# Patient Record
Sex: FEMALE | Race: WHITE | NOT HISPANIC OR LATINO | Employment: OTHER | URBAN - NONMETROPOLITAN AREA
[De-identification: names, ages, dates, MRNs, and addresses within clinical notes are randomized per-mention and may not be internally consistent; named-entity substitution may affect disease eponyms.]

---

## 2018-07-07 ENCOUNTER — APPOINTMENT (EMERGENCY)
Dept: CT IMAGING | Facility: HOSPITAL | Age: 33
DRG: 422 | End: 2018-07-07
Payer: COMMERCIAL

## 2018-07-07 ENCOUNTER — HOSPITAL ENCOUNTER (INPATIENT)
Facility: HOSPITAL | Age: 33
LOS: 2 days | Discharge: HOME | DRG: 422 | End: 2018-07-10
Attending: EMERGENCY MEDICINE | Admitting: INTERNAL MEDICINE
Payer: COMMERCIAL

## 2018-07-07 DIAGNOSIS — M62.82 NON-TRAUMATIC RHABDOMYOLYSIS: ICD-10-CM

## 2018-07-07 DIAGNOSIS — E83.51 HYPOCALCEMIA: ICD-10-CM

## 2018-07-07 DIAGNOSIS — R53.1 LEFT-SIDED WEAKNESS: ICD-10-CM

## 2018-07-07 DIAGNOSIS — E87.6 HYPOKALEMIA: Primary | ICD-10-CM

## 2018-07-07 DIAGNOSIS — E55.9 VITAMIN D DEFICIENCY: ICD-10-CM

## 2018-07-07 DIAGNOSIS — M62.81 MUSCLE WEAKNESS OF LEFT UPPER EXTREMITY: ICD-10-CM

## 2018-07-07 DIAGNOSIS — M79.10 GENERALIZED MUSCLE ACHE: ICD-10-CM

## 2018-07-07 DIAGNOSIS — R74.01 TRANSAMINITIS: ICD-10-CM

## 2018-07-07 DIAGNOSIS — D64.9 ANEMIA, UNSPECIFIED TYPE: ICD-10-CM

## 2018-07-07 DIAGNOSIS — R29.0 CARPOPEDAL SPASM: ICD-10-CM

## 2018-07-07 DIAGNOSIS — M62.81 MUSCLE WEAKNESS OF LOWER EXTREMITY: ICD-10-CM

## 2018-07-07 DIAGNOSIS — E83.39 HYPOPHOSPHATEMIA: ICD-10-CM

## 2018-07-07 PROBLEM — Z86.69 HISTORY OF MIGRAINE HEADACHES: Status: ACTIVE | Noted: 2018-07-07

## 2018-07-07 PROBLEM — Z98.84 HISTORY OF GASTRIC BYPASS: Status: ACTIVE | Noted: 2018-07-07

## 2018-07-07 LAB
ALBUMIN SERPL BCP-MCNC: 3.5 G/DL (ref 3.5–5)
ALP SERPL-CCNC: 59 U/L (ref 46–116)
ALT SERPL W P-5'-P-CCNC: 61 U/L (ref 12–78)
ANION GAP SERPL CALCULATED.3IONS-SCNC: 9 MMOL/L (ref 4–13)
ANION GAP SERPL CALCULATED.3IONS-SCNC: 9 MMOL/L (ref 4–13)
AST SERPL W P-5'-P-CCNC: 70 U/L (ref 5–45)
BILIRUB SERPL-MCNC: 0.2 MG/DL (ref 0.2–1)
BILIRUB UR QL STRIP: NEGATIVE
BUN SERPL-MCNC: 11 MG/DL (ref 5–25)
BUN SERPL-MCNC: 13 MG/DL (ref 5–25)
CALCIUM SERPL-MCNC: 7.7 MG/DL (ref 8.3–10.1)
CALCIUM SERPL-MCNC: 8.7 MG/DL (ref 8.3–10.1)
CHLORIDE SERPL-SCNC: 102 MMOL/L (ref 100–108)
CHLORIDE SERPL-SCNC: 108 MMOL/L (ref 100–108)
CK MB SERPL-MCNC: 6.3 NG/ML (ref 0–5)
CK MB SERPL-MCNC: <1 % (ref 0–2.5)
CK SERPL-CCNC: 968 U/L (ref 26–192)
CLARITY UR: CLEAR
CO2 SERPL-SCNC: 26 MMOL/L (ref 21–32)
CO2 SERPL-SCNC: 31 MMOL/L (ref 21–32)
COLOR UR: YELLOW
CREAT SERPL-MCNC: 1.09 MG/DL (ref 0.6–1.3)
CREAT SERPL-MCNC: 1.17 MG/DL (ref 0.6–1.3)
ERYTHROCYTE [DISTWIDTH] IN BLOOD BY AUTOMATED COUNT: 14.3 % (ref 11.6–15.1)
GFR SERPL CREATININE-BSD FRML MDRD: 61 ML/MIN/1.73SQ M
GFR SERPL CREATININE-BSD FRML MDRD: 66 ML/MIN/1.73SQ M
GLUCOSE SERPL-MCNC: 107 MG/DL (ref 65–140)
GLUCOSE SERPL-MCNC: 88 MG/DL (ref 65–140)
GLUCOSE UR STRIP-MCNC: NEGATIVE MG/DL
HCT VFR BLD AUTO: 40.1 % (ref 34.8–46.1)
HGB BLD-MCNC: 14.6 G/DL (ref 11.5–15.4)
HGB UR QL STRIP.AUTO: NEGATIVE
KETONES UR STRIP-MCNC: NEGATIVE MG/DL
LACTATE SERPL-SCNC: 1.9 MMOL/L (ref 0.5–2)
LEUKOCYTE ESTERASE UR QL STRIP: NEGATIVE
MAGNESIUM SERPL-MCNC: 2.4 MG/DL (ref 1.6–2.6)
MAGNESIUM SERPL-MCNC: 2.8 MG/DL (ref 1.6–2.6)
MCH RBC QN AUTO: 30.9 PG (ref 26.8–34.3)
MCHC RBC AUTO-ENTMCNC: 36.4 G/DL (ref 31.4–37.4)
MCV RBC AUTO: 85 FL (ref 82–98)
NITRITE UR QL STRIP: NEGATIVE
PH UR STRIP.AUTO: 6.5 [PH] (ref 4.5–8)
PHOSPHATE SERPL-MCNC: 3.3 MG/DL (ref 2.7–4.5)
PLATELET # BLD AUTO: 336 THOUSANDS/UL (ref 149–390)
PMV BLD AUTO: 10.2 FL (ref 8.9–12.7)
POTASSIUM SERPL-SCNC: 1.8 MMOL/L (ref 3.5–5.3)
POTASSIUM SERPL-SCNC: 1.8 MMOL/L (ref 3.5–5.3)
PROT SERPL-MCNC: 7.3 G/DL (ref 6.4–8.2)
PROT UR STRIP-MCNC: NEGATIVE MG/DL
RBC # BLD AUTO: 4.73 MILLION/UL (ref 3.81–5.12)
SODIUM SERPL-SCNC: 142 MMOL/L (ref 136–145)
SODIUM SERPL-SCNC: 143 MMOL/L (ref 136–145)
SP GR UR STRIP.AUTO: 1.01 (ref 1–1.03)
TSH SERPL DL<=0.05 MIU/L-ACNC: 2.6 UIU/ML (ref 0.36–3.74)
UROBILINOGEN UR QL STRIP.AUTO: 0.2 E.U./DL
WBC # BLD AUTO: 9.35 THOUSAND/UL (ref 4.31–10.16)

## 2018-07-07 PROCEDURE — 70450 CT HEAD/BRAIN W/O DYE: CPT

## 2018-07-07 PROCEDURE — 83605 ASSAY OF LACTIC ACID: CPT | Performed by: INTERNAL MEDICINE

## 2018-07-07 PROCEDURE — 84100 ASSAY OF PHOSPHORUS: CPT | Performed by: PHYSICIAN ASSISTANT

## 2018-07-07 PROCEDURE — 96361 HYDRATE IV INFUSION ADD-ON: CPT

## 2018-07-07 PROCEDURE — 83735 ASSAY OF MAGNESIUM: CPT | Performed by: PHYSICIAN ASSISTANT

## 2018-07-07 PROCEDURE — 83735 ASSAY OF MAGNESIUM: CPT | Performed by: INTERNAL MEDICINE

## 2018-07-07 PROCEDURE — 80053 COMPREHEN METABOLIC PANEL: CPT | Performed by: PHYSICIAN ASSISTANT

## 2018-07-07 PROCEDURE — 99285 EMERGENCY DEPT VISIT HI MDM: CPT

## 2018-07-07 PROCEDURE — 85025 COMPLETE CBC W/AUTO DIFF WBC: CPT | Performed by: PHYSICIAN ASSISTANT

## 2018-07-07 PROCEDURE — 36415 COLL VENOUS BLD VENIPUNCTURE: CPT | Performed by: PHYSICIAN ASSISTANT

## 2018-07-07 PROCEDURE — 96365 THER/PROPH/DIAG IV INF INIT: CPT

## 2018-07-07 PROCEDURE — 99219 PR INITIAL OBSERVATION CARE/DAY 50 MINUTES: CPT | Performed by: INTERNAL MEDICINE

## 2018-07-07 PROCEDURE — 82550 ASSAY OF CK (CPK): CPT | Performed by: PHYSICIAN ASSISTANT

## 2018-07-07 PROCEDURE — 80048 BASIC METABOLIC PNL TOTAL CA: CPT | Performed by: INTERNAL MEDICINE

## 2018-07-07 PROCEDURE — 82553 CREATINE MB FRACTION: CPT | Performed by: PHYSICIAN ASSISTANT

## 2018-07-07 PROCEDURE — 84443 ASSAY THYROID STIM HORMONE: CPT | Performed by: PHYSICIAN ASSISTANT

## 2018-07-07 PROCEDURE — 81003 URINALYSIS AUTO W/O SCOPE: CPT | Performed by: PHYSICIAN ASSISTANT

## 2018-07-07 RX ORDER — ONDANSETRON 2 MG/ML
4 INJECTION INTRAMUSCULAR; INTRAVENOUS EVERY 6 HOURS PRN
Status: DISCONTINUED | OUTPATIENT
Start: 2018-07-07 | End: 2018-07-10 | Stop reason: HOSPADM

## 2018-07-07 RX ORDER — AMITRIPTYLINE HYDROCHLORIDE 100 MG/1
25 TABLET, FILM COATED ORAL
COMMUNITY
End: 2018-09-24 | Stop reason: HOSPADM

## 2018-07-07 RX ORDER — POTASSIUM CHLORIDE 20 MEQ/1
60 TABLET, EXTENDED RELEASE ORAL ONCE
Status: COMPLETED | OUTPATIENT
Start: 2018-07-07 | End: 2018-07-07

## 2018-07-07 RX ORDER — ACETAMINOPHEN 325 MG/1
650 TABLET ORAL EVERY 6 HOURS PRN
Status: DISCONTINUED | OUTPATIENT
Start: 2018-07-07 | End: 2018-07-07

## 2018-07-07 RX ORDER — POTASSIUM CHLORIDE 20 MEQ/1
40 TABLET, EXTENDED RELEASE ORAL ONCE
Status: COMPLETED | OUTPATIENT
Start: 2018-07-07 | End: 2018-07-07

## 2018-07-07 RX ORDER — TOPIRAMATE 50 MG/1
25 TABLET, FILM COATED ORAL
COMMUNITY
End: 2018-10-14

## 2018-07-07 RX ORDER — POTASSIUM CHLORIDE 14.9 MG/ML
20 INJECTION INTRAVENOUS ONCE
Status: COMPLETED | OUTPATIENT
Start: 2018-07-07 | End: 2018-07-07

## 2018-07-07 RX ORDER — MECLIZINE HYDROCHLORIDE 25 MG/1
25 TABLET ORAL 2 TIMES DAILY
Status: ON HOLD | COMMUNITY
End: 2018-07-10

## 2018-07-07 RX ORDER — POTASSIUM CHLORIDE 20 MEQ/1
40 TABLET, EXTENDED RELEASE ORAL ONCE
Status: DISCONTINUED | OUTPATIENT
Start: 2018-07-07 | End: 2018-07-07

## 2018-07-07 RX ORDER — MAGNESIUM SULFATE HEPTAHYDRATE 40 MG/ML
2 INJECTION, SOLUTION INTRAVENOUS ONCE
Status: COMPLETED | OUTPATIENT
Start: 2018-07-07 | End: 2018-07-07

## 2018-07-07 RX ORDER — POTASSIUM CHLORIDE AND SODIUM CHLORIDE 900; 300 MG/100ML; MG/100ML
125 INJECTION, SOLUTION INTRAVENOUS CONTINUOUS
Status: DISCONTINUED | OUTPATIENT
Start: 2018-07-07 | End: 2018-07-09

## 2018-07-07 RX ADMIN — POTASSIUM CHLORIDE 60 MEQ: 1500 TABLET, EXTENDED RELEASE ORAL at 17:40

## 2018-07-07 RX ADMIN — ENOXAPARIN SODIUM 40 MG: 40 INJECTION, SOLUTION INTRAVENOUS; SUBCUTANEOUS at 20:22

## 2018-07-07 RX ADMIN — POTASSIUM CHLORIDE AND SODIUM CHLORIDE 125 ML/HR: 900; 300 INJECTION, SOLUTION INTRAVENOUS at 20:10

## 2018-07-07 RX ADMIN — MAGNESIUM SULFATE HEPTAHYDRATE 2 G: 40 INJECTION, SOLUTION INTRAVENOUS at 18:16

## 2018-07-07 RX ADMIN — POTASSIUM CHLORIDE 20 MEQ: 200 INJECTION, SOLUTION INTRAVENOUS at 17:37

## 2018-07-07 RX ADMIN — POTASSIUM CHLORIDE 40 MEQ: 1500 TABLET, EXTENDED RELEASE ORAL at 20:21

## 2018-07-07 RX ADMIN — SODIUM CHLORIDE 1000 ML: 0.9 INJECTION, SOLUTION INTRAVENOUS at 16:24

## 2018-07-07 NOTE — H&P
H&P- Thanh Jean 1985, 28 y o  female MRN: [id number]    Unit/Bed#: [facility] Encounter: [id number]    Primary Care Provider: Shruthi Stokes MD   Date and time admitted to hospital: 7/7/2018  3:41 PM        * Hypokalemia   Assessment & Plan    -place in 23-hour observation, med/surg level of care with telemetry monitoring in the setting of hypokalemia  -hypokalemia is likely multifactorial secondary the patient changing her diet over the last few weeks, secondary to dehydration, and with a history of gastric bypass  -replete with p o  and IV potassium chloride supplementation  -recheck a BMP and magnesium level at 2200 this evening  -consult Nephrology          Left-sided weakness   Assessment & Plan    -likely secondary to hypokalemia  -neurologic checks every 4 hours  -if the weakness does not improve with potassium supplementation, she will need a stroke workup        Non-traumatic rhabdomyolysis   Assessment & Plan    -IV fluids  -follow the total CK (creatinine kinase) level  -follow the renal function        History of migraine headaches   Assessment & Plan    -continue topamax and amitriptyline        History of gastric bypass   Assessment & Plan    -likely contributing to the hypokalemia              VTE Prophylaxis: Enoxaparin (Lovenox)  / sequential compression device   Code Status:  Level 1-Full Code      Anticipated Length of Stay:  Patient will be admitted on an Observation basis with an anticipated length of stay of less than 2 midnights  Justification for Hospital Stay:  The patient requires IV potassium chloride supplementation, IV fluids, and serial laboratory testing to monitor her electrolytes and total CK (creatinine kinase) level  Total Time for Visit, including Counseling / Coordination of Care: 45 minutes  Greater than 50% of this total time spent on direct patient counseling and coordination of care      Chief Complaint:  Weakness    History of Present Illness:    Thanh Jean is a 28 y o  female who presents to the emergency department with the complaint of weakness  Since the end of May 2018, the patient has been experiencing bilateral hand weakness, which has been episodic in nature  She would develop bilateral hand cramping, which led to involuntary closing of her bilateral hands  She then would experience difficulty picking up objects  Over the last few weeks, she has been experiencing intermittent left upper extremity weakness and paresthesias  The weakness and paresthesias were episodic in nature  Over the last few days, the patient has been experiencing left lower extremity weakness along with the left upper extremity weakness  Nothing seemed to improve her symptoms  She has been following a strict diet over the last few weeks and has a history of gastric bypass surgery  She denies any nausea, vomiting, or diarrhea  She denies any laxative use  She does thinks she may have a component of dehydration secondary to the inability to drink an adequate amount of fluid with her gastric bypass history  No chest pain  No shortness of breath  No fevers or chills  She complains of chronic, daily migraine headaches every since she suffered a concussion in the past     Review of Systems:    Review of Systems:  Per HPI, all other systems have been reviewed and were negative  Past Medical and Surgical History:     History reviewed  No pertinent past medical history  Past Surgical History:   Procedure Laterality Date    ABDOMINAL SURGERY      APPENDECTOMY      CHOLECYSTECTOMY      GASTRIC BYPASS      HYSTERECTOMY         Meds/Allergies:    Prior to Admission medications    Not on File     I have reviewed home medications with patient personally      Allergies: No Known Allergies    Social History:     Marital Status: /Civil Union     Substance Use History:   History   Alcohol Use    Yes     Comment: socially     History   Smoking Status    Never Smoker   Smokeless Tobacco    Not on file     History   Drug Use No       Family History:    non-contributory    Physical Exam:     Vitals:   Blood Pressure: 116/56 (07/07/18 1745)  Pulse: 76 (07/07/18 1745)  Temperature: 98 5 °F (36 9 °C) (07/07/18 1545)  Temp Source: Temporal (07/07/18 1545)  Respirations: 17 (07/07/18 1745)  Weight - Scale: 64 9 kg (143 lb) (07/07/18 1545)  SpO2: 98 % (07/07/18 1745)    Physical Exam    General:  NAD, awake, alert, oriented x 3  HEENT:  NC/AT, mucous membranes dry  Neck:  Supple, No JVP elevation  CV:  + S1, + S2, RRR  Pulm:  Lung fields are CTA bilaterally  Abd:  Soft, Non-tender, Non-distended  Ext:  No clubbing/cyanosis/edema  Skin:  No rashes  Neuro:  Decreased muscle strength involving the left upper extremity and left lower extremity rated as a 4/5 compared to a 5/5 muscle strength throughout the right upper extremity and right lower extremity      Additional Data:     Lab Results: I have personally reviewed pertinent reports  Results from last 7 days  Lab Units 07/07/18  1623   WBC Thousand/uL 9 35   HEMOGLOBIN g/dL 14 6   HEMATOCRIT % 40 1   PLATELETS Thousands/uL 336       Results from last 7 days  Lab Units 07/07/18  1623   SODIUM mmol/L 142   POTASSIUM mmol/L 1 8*   CHLORIDE mmol/L 102   CO2 mmol/L 31   BUN mg/dL 13   CREATININE mg/dL 1 09   CALCIUM mg/dL 8 7   TOTAL PROTEIN g/dL 7 3   BILIRUBIN TOTAL mg/dL 0 20   ALK PHOS U/L 59   ALT U/L 61   AST U/L 70*   GLUCOSE RANDOM mg/dL 88                   Imaging: I have personally reviewed pertinent reports  CT head without contrast   Final Result by Quang Ngo MD (07/07 1648)      No acute intracranial abnormality  Workstation performed: [id number]               [facility] Records Reviewed: Yes     ** Please Note: This note has been constructed using a voice recognition system   **

## 2018-07-07 NOTE — ASSESSMENT & PLAN NOTE
-place in 23-hour observation, med/surg level of care with telemetry monitoring in the setting of hypokalemia  -hypokalemia is likely multifactorial secondary the patient changing her diet over the last few weeks, secondary to dehydration, and with a history of gastric bypass  -replete with p o  and IV potassium chloride supplementation  -recheck a BMP and magnesium level at 2200 this evening  -consult Nephrology

## 2018-07-07 NOTE — ED PROVIDER NOTES
History  Chief Complaint   Patient presents with    Weakness - Generalized     Patient states that she started with a tingling in her left arm and hand 3 weeks ago  Over the past 3 weeks the weakness has started to move into her left leg, her right arm, and right leg  28 yr female with pmh of s/p gastric bypass 3 yrs ago presents with 3 weeks of weakness muscle tension and spasms tingling paresthesias  first sx was left hand curling up and spasming and going numb  Happened intermittently for about 1 week happening for 15 minutes or so at a time  She also had tingling sensation "like my hand was asleep" in left hand intermittently for about 1 week also  These sx became more frequent for the next 2 weeks and worse yet in the last 2 days  Now having decreased fine motor coordination and  strength in left hand- counting money out at the movie theatre and gripping a cup was difficult  In past 2 days she now also has left foot tightness and weakness in the left leg described as taking increased effort to lift left leg up a step/stairs  Today she has right wrist/forearm and right foot/calf "muscle tension "  Feeling tired lightheaded and somewhat weak all over  Sx commenced just after memorial day  Incidentally pt did restart her migraine maintenance meds at this same time  (topamax/elavil) and prn meclizine for vertigo she was on these previously for years but had been doing well and was off them for several months until restarting again last month  History provided by:  Patient      Prior to Admission Medications   Prescriptions Last Dose Informant Patient Reported?  Taking?   amitriptyline (ELAVIL) 100 mg tablet 7/6/2018 at 2200  Yes Yes   Sig: Take 25 mg by mouth daily at bedtime   meclizine (ANTIVERT) 25 mg tablet 7/6/2018 at 2200 Self Yes Yes   Sig: Take 25 mg by mouth 2 (two) times a day   topiramate (TOPAMAX) 50 MG tablet 7/6/2018 at 2200  Yes Yes   Sig: Take 25 mg by mouth every 12 (twelve) hours      Facility-Administered Medications: None       History reviewed  No pertinent past medical history  Past Surgical History:   Procedure Laterality Date    ABDOMINAL SURGERY      APPENDECTOMY      CHOLECYSTECTOMY      GASTRIC BYPASS      HYSTERECTOMY         History reviewed  No pertinent family history  I have reviewed and agree with the history as documented  Social History   Substance Use Topics    Smoking status: Never Smoker    Smokeless tobacco: Never Used    Alcohol use Yes      Comment: socially        Review of Systems   Constitutional: Positive for fatigue  Negative for activity change, appetite change, chills, diaphoresis, fever and unexpected weight change  HENT: Negative for congestion, ear pain, postnasal drip, rhinorrhea, sinus pressure, sore throat and voice change  Eyes: Negative for pain, discharge, redness and visual disturbance  Respiratory: Negative for cough, chest tightness and shortness of breath  Cardiovascular: Negative for chest pain, palpitations and leg swelling  Gastrointestinal: Negative for abdominal pain, constipation, diarrhea, nausea and vomiting  Genitourinary: Negative for decreased urine volume, difficulty urinating, dysuria, flank pain, frequency, hematuria and urgency  Musculoskeletal: Positive for gait problem and myalgias  Negative for arthralgias, back pain and neck pain  Skin: Negative for color change, rash and wound  Allergic/Immunologic: Negative for immunocompromised state  Neurological: Positive for weakness, light-headedness and numbness  Negative for dizziness, tremors, seizures, syncope, facial asymmetry, speech difficulty and headaches  Physical Exam  Physical Exam   Constitutional: She is oriented to person, place, and time  Vital signs are normal  She appears well-developed and well-nourished  She is active and cooperative  Non-toxic appearance  No distress     HENT:   Head: Normocephalic and atraumatic  Right Ear: Tympanic membrane and external ear normal    Left Ear: Tympanic membrane and external ear normal    Nose: Nose normal  No rhinorrhea  Mouth/Throat: Uvula is midline and oropharynx is clear and moist    Eyes: Conjunctivae, EOM and lids are normal  Pupils are equal, round, and reactive to light  Right eye exhibits no discharge  Left eye exhibits no discharge  Neck: Normal range of motion and full passive range of motion without pain  Neck supple  No JVD present  No thyromegaly present  Cardiovascular: Normal rate, regular rhythm, normal heart sounds and intact distal pulses  No murmur heard  Pulmonary/Chest: Effort normal and breath sounds normal  No accessory muscle usage  No tachypnea  No respiratory distress  She has no wheezes  Abdominal: Soft  Normal appearance and bowel sounds are normal  She exhibits no distension  There is no hepatosplenomegaly  There is no tenderness  There is no rebound and no CVA tenderness  No hernia  Musculoskeletal: Normal range of motion  She exhibits no tenderness  Lymphadenopathy:     She has no cervical adenopathy  Neurological: She is alert and oriented to person, place, and time  She is not disoriented  She displays no tremor  No cranial nerve deficit or sensory deficit  She exhibits abnormal muscle tone  She displays no seizure activity  Gait (slow purposeful gait) abnormal  Coordination normal  GCS eye subscore is 4  GCS verbal subscore is 5  GCS motor subscore is 6  No dysarthria, nystagmus, dysphagia, diplopia, aphasia, vertigo, ataxia, vision loss, dysmetria  Normal finger to nose, gait, rapid alternating movement  No pronator drift  Normal heel to shin  EOMI, no visual field defects  CN 2-13 intact  GCS 15  AOx3  Normal babinski  Strength: RUE 4/5 LUE 3/5; RLE 5/5 LLE 3/5   Skin: Skin is warm, dry and intact  Capillary refill takes less than 2 seconds  No rash noted  She is not diaphoretic  No erythema  No pallor  Psychiatric: She has a normal mood and affect  Her speech is normal and behavior is normal    Nursing note and vitals reviewed        Vital Signs  ED Triage Vitals   Temperature Pulse Respirations Blood Pressure SpO2   07/07/18 1545 07/07/18 1545 07/07/18 1545 07/07/18 1545 07/07/18 1545   98 5 °F (36 9 °C) 78 18 129/72 100 %      Temp Source Heart Rate Source Patient Position - Orthostatic VS BP Location FiO2 (%)   07/07/18 1545 07/07/18 1545 07/07/18 1745 07/07/18 1745 --   Temporal Monitor Sitting Left arm       Pain Score       07/07/18 1545       No Pain           Vitals:    07/07/18 1545 07/07/18 1745 07/07/18 1859   BP: 129/72 116/56 109/65   Pulse: 78 76 78   Patient Position - Orthostatic VS:  Sitting Lying       Visual Acuity  Visual Acuity      Most Recent Value   L Pupil Size (mm)  4   R Pupil Size (mm)  4   L Pupil Shape  Round   R Pupil Shape  Round          ED Medications  Medications   sodium chloride 0 9 % with KCl 40 mEq/L infusion (premix) (125 mL/hr Intravenous New Bag 7/7/18 2010)   potassium chloride (K-DUR,KLOR-CON) CR tablet 40 mEq (not administered)   ondansetron (ZOFRAN) injection 4 mg (not administered)   enoxaparin (LOVENOX) subcutaneous injection 40 mg (not administered)   sodium chloride 0 9 % bolus 1,000 mL (0 mL Intravenous Stopped 7/7/18 1815)   potassium chloride 20 mEq IVPB (premix) (20 mEq Intravenous New Bag 7/7/18 1737)   magnesium sulfate 2 g/50 mL IVPB (premix) 2 g (2 g Intravenous New Bag 7/7/18 1816)   potassium chloride (K-DUR,KLOR-CON) CR tablet 60 mEq (60 mEq Oral Given 7/7/18 1740)       Diagnostic Studies  Results Reviewed     Procedure Component Value Units Date/Time    Basic metabolic panel [[id number]]     Lab Status:  No result Specimen:  Blood     Magnesium [[id number]]     Lab Status:  No result Specimen:  Blood     Lactic acid, plasma [[id number]]     Lab Status:  No result Specimen:  Blood     CKMB [[id number]]  (Abnormal) Collected:  07/07/18 1623    Lab Status: Final result Specimen:  Blood from Arm, Right Updated:  07/07/18 1843     CK-MB Index <1 0 %      CK-MB FRACTION 6 3 (H) ng/mL     CK (with reflex to MB) [[id number]]  (Abnormal) Collected:  07/07/18 1623    Lab Status:  Final result Specimen:  Blood from Arm, Right Updated:  07/07/18 1810     Total  (H) U/L     UA w Reflex to Microscopic w Reflex to Culture [[id number]] Collected:  07/07/18 1741    Lab Status:  Final result Specimen:  Urine from Urine, Clean Catch Updated:  07/07/18 1808     Color, UA Yellow     Clarity, UA Clear     Specific Gravity, UA 1 015     pH, UA 6 5     Leukocytes, UA Negative     Nitrite, UA Negative     Protein, UA Negative mg/dl      Glucose, UA Negative mg/dl      Ketones, UA Negative mg/dl      Urobilinogen, UA 0 2 E U /dl      Bilirubin, UA Negative     Blood, UA Negative    Comprehensive metabolic panel [[id number]]  (Abnormal) Collected:  07/07/18 1623    Lab Status:  Final result Specimen:  Blood from Arm, Right Updated:  07/07/18 1701     Sodium 142 mmol/L      Potassium 1 8 (LL) mmol/L      Chloride 102 mmol/L      CO2 31 mmol/L      Anion Gap 9 mmol/L      BUN 13 mg/dL      Creatinine 1 09 mg/dL      Glucose 88 mg/dL      Calcium 8 7 mg/dL      AST 70 (H) U/L      ALT 61 U/L      Alkaline Phosphatase 59 U/L      Total Protein 7 3 g/dL      Albumin 3 5 g/dL      Total Bilirubin 0 20 mg/dL      eGFR 66 ml/min/1 73sq m     Narrative:         National Kidney Disease Education Program recommendations are as follows:  GFR calculation is accurate only with a steady state creatinine  Chronic Kidney disease less than 60 ml/min/1 73 sq  meters  Kidney failure less than 15 ml/min/1 73 sq  meters      TSH [[id number]]  (Normal) Collected:  07/07/18 1623    Lab Status:  Final result Specimen:  Blood from Arm, Right Updated:  07/07/18 1659     TSH 3RD GENERATON 2 597 uIU/mL     Narrative:         Patients undergoing fluorescein dye angiography may retain small amounts of fluorescein in the body for 48-72 hours post procedure  Samples containing fluorescein can produce falsely depressed TSH values  If the patient had this procedure,a specimen should be resubmitted post fluorescein clearance  The recommended reference ranges for TSH during pregnancy are as follows:  First trimester 0 1 to 2 5 uIU/mL  Second trimester  0 2 to 3 0 uIU/mL  Third trimester 0 3 to 3 0 uIU/m      Magnesium [[id number]]  (Normal) Collected:  07/07/18 1623    Lab Status:  Final result Specimen:  Blood from Arm, Right Updated:  07/07/18 1659     Magnesium 2 4 mg/dL     Phosphorus [[id number]]  (Normal) Collected:  07/07/18 1623    Lab Status:  Final result Specimen:  Blood from Arm, Right Updated:  07/07/18 1648     Phosphorus 3 3 mg/dL     CBC and differential [[id number]]  (Normal) Collected:  07/07/18 1623    Lab Status:  Final result Specimen:  Blood from Arm, Right Updated:  07/07/18 1636     WBC 9 35 Thousand/uL      RBC 4 73 Million/uL      Hemoglobin 14 6 g/dL      Hematocrit 40 1 %      MCV 85 fL      MCH 30 9 pg      MCHC 36 4 g/dL      RDW 14 3 %      MPV 10 2 fL      Platelets 629 Thousands/uL     Narrative: This is an appended report  These results have been appended to a previously verified report  CT head without contrast   Final Result by Keara Sanz MD (07/07 1648)      No acute intracranial abnormality                    Workstation performed: [id number]                    Procedures  ECG 12 Lead Documentation  Date/Time: 7/7/2018 5:12 PM  Performed by: Artie Davies  Authorized by: Artie Davies     Indications / Diagnosis:  Hypokalemia  ECG reviewed by me, the ED Provider: yes    Patient location:  ED  Rate:     ECG rate:  71  Rhythm:     Rhythm: sinus rhythm    Ectopy:     Ectopy: none    QRS:     QRS axis:  Normal  Conduction:     Conduction: normal    ST segments:     ST segments:  Normal  T waves:     T waves: non-specific      T waves comment:  [id number]           Phone Contacts  ED Phone Contact    ED Course  ED Course as of Jul 07 2011   Sat Jul 07, 2018   1632 WBC: 9 35   1632 Hemoglobin: 14 6   1632 Hematocrit: 40 1   1632 Platelets: 720   9205 Sodium: 142   1701 Potassium: (!!) 1 8   1701 Chloride: 102   1701 CO2: 31   1701 Anion Gap: 9   1701 BUN: 13   1701 Creatinine: 1 09   1701 Glucose: 88   1701 eGFR: 66   1701 Phosphorus: 3 3   1701 TSH 3RD GENERATON: 2 597   1701 Magnesium: 2 4   1729 Updated pt on results of labs and CT  Pt with critical hypokalemia which does explain her symptomatology  Repletion ordered  Further questioning pt does not take any supplements s/p gastric bypass 3 yrs ago she was on 13 supplements/vitamins total after surgery her levels were monitored after improving her diet and she was able to d/c her supplements at about 1 year's time  She reports had 1 issue in past 1 year ago with hypokalemia after having GI bug with diarrhea  She denies weight loss supplementation/diuretics  MDM  Number of Diagnoses or Management Options  Carpopedal spasm: new and requires workup  Generalized muscle ache: new and requires workup  Hypokalemia: new and requires workup  Muscle weakness of left upper extremity: new and requires workup  Muscle weakness of lower extremity: new and requires workup  Diagnosis management comments: 28 yr female muscle weakness myalgias carpopedal spasms x 3 weeks  Will evaluate for electrolyte/metabolic disturbance, CTH for tumor/cva/white matter disease, CK for rhabdo, TSH  Pt is s/p gastric bypass at risk for electrolyte disturbance  Marked hypokalemia found- no gi losses/diuretics/weight loss stimulant supplements  Consider renal disorder, congenital, or thyroid           Amount and/or Complexity of Data Reviewed  Clinical lab tests: ordered and reviewed  Tests in the radiology section of CPT®: ordered and reviewed  Decide to obtain previous medical records or to obtain history from someone other than the patient: yes  Obtain history from someone other than the patient: yes  Discuss the patient with other providers: yes  Independent visualization of images, tracings, or specimens: yes    Patient Progress  Patient progress: stable    The patient presented with a condition in which there was a high probability of imminent or life-threatening deterioration, and critical care services (excluding separately billable procedures) totalled 30-74 minutes (critical hypokalemia requiring repetion, admission for same)  Disposition  Final diagnoses:   Hypokalemia   Muscle weakness of left upper extremity   Muscle weakness of lower extremity - left   Carpopedal spasm   Generalized muscle ache     Time reflects when diagnosis was documented in both MDM as applicable and the Disposition within this note     Time User Action Codes Description Comment    7/7/2018  5:51 PM Moraimaa Matthew [E87 6] Hypokalemia     7/7/2018  5:51 PM Molinda Footman [M62 81] Muscle weakness of left upper extremity     7/7/2018  5:51 PM Molinda Footman [M62 81] Muscle weakness of lower extremity     7/7/2018  5:51 PM Desi Weaver [K74 72] Muscle weakness of lower extremity left    7/7/2018  5:51 PM Areli Rough Add [R29 0] Carpopedal spasm     7/7/2018  5:52 PM Areli Rough Add [M79 1] Generalized muscle ache       ED Disposition     ED Disposition Condition Comment    Admit  Case was discussed with NASRA and the patient's admission status was agreed to be Admission Status: observation status to the service of Dr Carola Wilson           Follow-up Information    None         Current Discharge Medication List      CONTINUE these medications which have NOT CHANGED    Details   amitriptyline (ELAVIL) 100 mg tablet Take 25 mg by mouth daily at bedtime      meclizine (ANTIVERT) 25 mg tablet Take 25 mg by mouth 2 (two) times a day      topiramate (TOPAMAX) 50 MG tablet Take 25 mg by mouth every 12 (twelve) hours           No discharge procedures on file      ED Provider  Electronically Signed by           Taiwo Bryan PA-C  07/07/18 2011

## 2018-07-07 NOTE — ASSESSMENT & PLAN NOTE
-likely secondary to hypokalemia  -neurologic checks every 4 hours  -if the weakness does not improve with potassium supplementation, she will need a stroke workup

## 2018-07-08 ENCOUNTER — APPOINTMENT (INPATIENT)
Dept: ULTRASOUND IMAGING | Facility: HOSPITAL | Age: 33
DRG: 422 | End: 2018-07-08
Payer: COMMERCIAL

## 2018-07-08 LAB
ALBUMIN SERPL BCP-MCNC: 2.3 G/DL (ref 3.5–5)
ALP SERPL-CCNC: 39 U/L (ref 46–116)
ALT SERPL W P-5'-P-CCNC: 38 U/L (ref 12–78)
ANION GAP SERPL CALCULATED.3IONS-SCNC: 4 MMOL/L (ref 4–13)
ANION GAP SERPL CALCULATED.3IONS-SCNC: 5 MMOL/L (ref 4–13)
AST SERPL W P-5'-P-CCNC: 45 U/L (ref 5–45)
BASOPHILS # BLD AUTO: 0.04 THOUSANDS/ΜL (ref 0–0.1)
BASOPHILS NFR BLD AUTO: 1 % (ref 0–1)
BILIRUB SERPL-MCNC: 0.2 MG/DL (ref 0.2–1)
BUN SERPL-MCNC: 10 MG/DL (ref 5–25)
BUN SERPL-MCNC: 12 MG/DL (ref 5–25)
CA-I BLD-SCNC: 1.05 MMOL/L (ref 1.12–1.32)
CALCIUM SERPL-MCNC: 7 MG/DL (ref 8.3–10.1)
CALCIUM SERPL-MCNC: 7.4 MG/DL (ref 8.3–10.1)
CHLORIDE SERPL-SCNC: 110 MMOL/L (ref 100–108)
CHLORIDE SERPL-SCNC: 112 MMOL/L (ref 100–108)
CK MB SERPL-MCNC: 4 NG/ML (ref 0–5)
CK MB SERPL-MCNC: <1 % (ref 0–2.5)
CK SERPL-CCNC: 611 U/L (ref 26–192)
CO2 SERPL-SCNC: 29 MMOL/L (ref 21–32)
CO2 SERPL-SCNC: 29 MMOL/L (ref 21–32)
CREAT SERPL-MCNC: 0.78 MG/DL (ref 0.6–1.3)
CREAT SERPL-MCNC: 0.82 MG/DL (ref 0.6–1.3)
EOSINOPHIL # BLD AUTO: 0.25 THOUSAND/ΜL (ref 0–0.61)
EOSINOPHIL NFR BLD AUTO: 3 % (ref 0–6)
ERYTHROCYTE [DISTWIDTH] IN BLOOD BY AUTOMATED COUNT: 14.5 % (ref 11.6–15.1)
FOLATE SERPL-MCNC: 11 NG/ML (ref 3.1–17.5)
GFR SERPL CREATININE-BSD FRML MDRD: 94 ML/MIN/1.73SQ M
GFR SERPL CREATININE-BSD FRML MDRD: 99 ML/MIN/1.73SQ M
GLUCOSE SERPL-MCNC: 82 MG/DL (ref 65–140)
GLUCOSE SERPL-MCNC: 92 MG/DL (ref 65–140)
HCT VFR BLD AUTO: 30.4 % (ref 34.8–46.1)
HGB BLD-MCNC: 10.8 G/DL (ref 11.5–15.4)
LACTATE SERPL-SCNC: 1.1 MMOL/L (ref 0.5–2)
LYMPHOCYTES # BLD AUTO: 3.29 THOUSANDS/ΜL (ref 0.6–4.47)
LYMPHOCYTES NFR BLD AUTO: 42 % (ref 14–44)
MAGNESIUM SERPL-MCNC: 2.4 MG/DL (ref 1.6–2.6)
MCH RBC QN AUTO: 30.9 PG (ref 26.8–34.3)
MCHC RBC AUTO-ENTMCNC: 35.5 G/DL (ref 31.4–37.4)
MCV RBC AUTO: 87 FL (ref 82–98)
MONOCYTES # BLD AUTO: 0.67 THOUSAND/ΜL (ref 0.17–1.22)
MONOCYTES NFR BLD AUTO: 9 % (ref 4–12)
NEUTROPHILS # BLD AUTO: 3.53 THOUSANDS/ΜL (ref 1.85–7.62)
NEUTS SEG NFR BLD AUTO: 45 % (ref 43–75)
PHOSPHATE SERPL-MCNC: 2.7 MG/DL (ref 2.7–4.5)
PLATELET # BLD AUTO: 251 THOUSANDS/UL (ref 149–390)
PMV BLD AUTO: 10.4 FL (ref 8.9–12.7)
POTASSIUM SERPL-SCNC: 2.3 MMOL/L (ref 3.5–5.3)
POTASSIUM SERPL-SCNC: 2.9 MMOL/L (ref 3.5–5.3)
PROT SERPL-MCNC: 5 G/DL (ref 6.4–8.2)
RBC # BLD AUTO: 3.5 MILLION/UL (ref 3.81–5.12)
SODIUM SERPL-SCNC: 144 MMOL/L (ref 136–145)
SODIUM SERPL-SCNC: 145 MMOL/L (ref 136–145)
VIT B12 SERPL-MCNC: 130 PG/ML (ref 100–900)
WBC # BLD AUTO: 7.78 THOUSAND/UL (ref 4.31–10.16)

## 2018-07-08 PROCEDURE — 93880 EXTRACRANIAL BILAT STUDY: CPT | Performed by: SURGERY

## 2018-07-08 PROCEDURE — 83735 ASSAY OF MAGNESIUM: CPT | Performed by: INTERNAL MEDICINE

## 2018-07-08 PROCEDURE — 93880 EXTRACRANIAL BILAT STUDY: CPT

## 2018-07-08 PROCEDURE — 80053 COMPREHEN METABOLIC PANEL: CPT | Performed by: INTERNAL MEDICINE

## 2018-07-08 PROCEDURE — 80048 BASIC METABOLIC PNL TOTAL CA: CPT | Performed by: INTERNAL MEDICINE

## 2018-07-08 PROCEDURE — 82607 VITAMIN B-12: CPT | Performed by: PHYSICIAN ASSISTANT

## 2018-07-08 PROCEDURE — 80074 ACUTE HEPATITIS PANEL: CPT | Performed by: INTERNAL MEDICINE

## 2018-07-08 PROCEDURE — 82746 ASSAY OF FOLIC ACID SERUM: CPT | Performed by: PHYSICIAN ASSISTANT

## 2018-07-08 PROCEDURE — 84100 ASSAY OF PHOSPHORUS: CPT | Performed by: INTERNAL MEDICINE

## 2018-07-08 PROCEDURE — 82553 CREATINE MB FRACTION: CPT | Performed by: INTERNAL MEDICINE

## 2018-07-08 PROCEDURE — 85025 COMPLETE CBC W/AUTO DIFF WBC: CPT | Performed by: INTERNAL MEDICINE

## 2018-07-08 PROCEDURE — 99232 SBSQ HOSP IP/OBS MODERATE 35: CPT | Performed by: INTERNAL MEDICINE

## 2018-07-08 PROCEDURE — 83605 ASSAY OF LACTIC ACID: CPT | Performed by: INTERNAL MEDICINE

## 2018-07-08 PROCEDURE — 82550 ASSAY OF CK (CPK): CPT | Performed by: INTERNAL MEDICINE

## 2018-07-08 PROCEDURE — 82330 ASSAY OF CALCIUM: CPT | Performed by: INTERNAL MEDICINE

## 2018-07-08 RX ORDER — AMITRIPTYLINE HYDROCHLORIDE 25 MG/1
25 TABLET, FILM COATED ORAL
Status: DISCONTINUED | OUTPATIENT
Start: 2018-07-08 | End: 2018-07-10 | Stop reason: HOSPADM

## 2018-07-08 RX ORDER — POTASSIUM CHLORIDE 14.9 MG/ML
20 INJECTION INTRAVENOUS ONCE
Status: COMPLETED | OUTPATIENT
Start: 2018-07-08 | End: 2018-07-08

## 2018-07-08 RX ORDER — POTASSIUM CHLORIDE 14.9 MG/ML
20 INJECTION INTRAVENOUS
Status: COMPLETED | OUTPATIENT
Start: 2018-07-08 | End: 2018-07-08

## 2018-07-08 RX ORDER — POTASSIUM CHLORIDE 20 MEQ/1
20 TABLET, EXTENDED RELEASE ORAL
Status: COMPLETED | OUTPATIENT
Start: 2018-07-08 | End: 2018-07-08

## 2018-07-08 RX ORDER — METHOCARBAMOL 500 MG/1
500 TABLET, FILM COATED ORAL ONCE
Status: COMPLETED | OUTPATIENT
Start: 2018-07-08 | End: 2018-07-08

## 2018-07-08 RX ORDER — POTASSIUM CHLORIDE 20 MEQ/1
40 TABLET, EXTENDED RELEASE ORAL
Status: COMPLETED | OUTPATIENT
Start: 2018-07-08 | End: 2018-07-08

## 2018-07-08 RX ORDER — LORAZEPAM 2 MG/ML
0.5 INJECTION INTRAMUSCULAR ONCE
Status: COMPLETED | OUTPATIENT
Start: 2018-07-08 | End: 2018-07-08

## 2018-07-08 RX ORDER — TOPIRAMATE 25 MG/1
25 TABLET ORAL EVERY 12 HOURS SCHEDULED
Status: DISCONTINUED | OUTPATIENT
Start: 2018-07-08 | End: 2018-07-10 | Stop reason: HOSPADM

## 2018-07-08 RX ADMIN — METHOCARBAMOL 500 MG: 500 TABLET ORAL at 15:17

## 2018-07-08 RX ADMIN — LORAZEPAM 0.5 MG: 2 INJECTION INTRAMUSCULAR; INTRAVENOUS at 15:17

## 2018-07-08 RX ADMIN — POTASSIUM CHLORIDE 40 MEQ: 1500 TABLET, EXTENDED RELEASE ORAL at 11:22

## 2018-07-08 RX ADMIN — POTASSIUM CHLORIDE 20 MEQ: 200 INJECTION, SOLUTION INTRAVENOUS at 05:41

## 2018-07-08 RX ADMIN — POTASSIUM CHLORIDE 20 MEQ: 200 INJECTION, SOLUTION INTRAVENOUS at 01:19

## 2018-07-08 RX ADMIN — POTASSIUM CHLORIDE AND SODIUM CHLORIDE 125 ML/HR: 900; 300 INJECTION, SOLUTION INTRAVENOUS at 15:18

## 2018-07-08 RX ADMIN — TOPIRAMATE 25 MG: 25 TABLET, FILM COATED ORAL at 11:22

## 2018-07-08 RX ADMIN — POTASSIUM CHLORIDE 20 MEQ: 1500 TABLET, EXTENDED RELEASE ORAL at 21:41

## 2018-07-08 RX ADMIN — POTASSIUM CHLORIDE 20 MEQ: 200 INJECTION, SOLUTION INTRAVENOUS at 11:22

## 2018-07-08 RX ADMIN — POTASSIUM CHLORIDE 40 MEQ: 1500 TABLET, EXTENDED RELEASE ORAL at 08:52

## 2018-07-08 RX ADMIN — POTASSIUM CHLORIDE 20 MEQ: 1500 TABLET, EXTENDED RELEASE ORAL at 19:39

## 2018-07-08 RX ADMIN — POTASSIUM CHLORIDE 20 MEQ: 200 INJECTION, SOLUTION INTRAVENOUS at 08:53

## 2018-07-08 RX ADMIN — ENOXAPARIN SODIUM 40 MG: 40 INJECTION, SOLUTION INTRAVENOUS; SUBCUTANEOUS at 19:41

## 2018-07-08 RX ADMIN — AMITRIPTYLINE HYDROCHLORIDE 25 MG: 25 TABLET, FILM COATED ORAL at 21:41

## 2018-07-08 RX ADMIN — TOPIRAMATE 25 MG: 25 TABLET, FILM COATED ORAL at 21:41

## 2018-07-08 NOTE — CASE MANAGEMENT
Initial Clinical Review    Admission: Date/Time/Statement: OBS   7/7  1753 converted to IP on 7/8 @  0905 for treatment of hypokalemia     Admitting Physician Katalina Souza    Level of Care Med Surg    Estimated length of stay More than 2 Midnights    Certification I certify that inpatient services are medically necessary for this patient for a duration of greater than two midnights  See H&P and MD Progress Notes for additional information about the patient's course of treatment        ED: Date/Time/Mode of Arrival:   ED Arrival Information     Expected Arrival Acuity Means of Arrival Escorted By Service Admission Type    - 7/7/2018 15:37 Urgent [facility] General Medicine Urgent    Arrival Complaint    WEAKNESS          Chief Complaint:   Chief Complaint   Patient presents with    Weakness - Generalized     Patient states that she started with a tingling in her left arm and hand 3 weeks ago  Over the past 3 weeks the weakness has started to move into her left leg, her right arm, and right leg  History of Illness: Jessee Marinelli is a 28 y o  female who presents to the emergency department with the complaint of weakness  Since the end of May 2018, the patient has been experiencing bilateral hand weakness, which has been episodic in nature  She would develop bilateral hand cramping, which led to involuntary closing of her bilateral hands  She then would experience difficulty picking up objects  Over the last few weeks, she has been experiencing intermittent left upper extremity weakness and paresthesias  The weakness and paresthesias were episodic in nature  Over the last few days, the patient has been experiencing left lower extremity weakness along with the left upper extremity weakness  Nothing seemed to improve her symptoms  She has been following a strict diet over the last few weeks and has a history of gastric bypass surgery      She does thinks she may have a component of dehydration secondary to the inability to drink an adequate amount of fluid with her gastric bypass history  ED Vital Signs:   ED Triage Vitals   Temperature Pulse Respirations Blood Pressure SpO2   07/07/18 1545 07/07/18 1545 07/07/18 1545 07/07/18 1545 07/07/18 1545   98 5 °F (36 9 °C) 78 18 129/72 100 %      Temp Source Heart Rate Source Patient Position - Orthostatic VS BP Location FiO2 (%)   07/07/18 1545 07/07/18 1545 07/07/18 1745 07/07/18 1745 --   Temporal Monitor Sitting Left arm       Pain Score       07/07/18 1545       No Pain        Wt Readings from Last 1 Encounters:   07/07/18 68 1 kg (150 lb 2 1 oz)       Vital Signs (abnormal):wnl    Abnormal Labs/Diagnostic Test Results: total CK   968, K  1 8   CT head- wnl     ED Treatment:   Medication Administration from 07/07/2018 1537 to 07/07/2018 1840       Date/Time Order Dose Route Action Action by Comments     07/07/2018 1815 sodium chloride 0 9 % bolus 1,000 mL 0 mL Intravenous Stopped Gema Chaudhary RN      07/07/2018 1624 sodium chloride 0 9 % bolus 1,000 mL 1,000 mL Intravenous [address] Gema Chaudhary RN      07/07/2018 1737 potassium chloride 20 mEq IVPB (premix) 20 mEq Intravenous [address] Gema Chaudhary RN      07/07/2018 1737 potassium chloride (K-DUR,KLOR-CON) CR tablet 40 mEq 40 mEq Oral Not Given Gema Chaudhary RN      07/07/2018 1816 magnesium sulfate 2 g/50 mL IVPB (premix) 2 g 2 g Intravenous [address] Gema Chaudhary RN      07/07/2018 1740 potassium chloride (K-DUR,KLOR-CON) CR tablet 60 mEq 60 mEq Oral Given Gema Chaudhary RN           Past Medical/Surgical History:    Active Ambulatory Problems     Diagnosis Date Noted    No Active Ambulatory Problems     Resolved Ambulatory Problems     Diagnosis Date Noted    No Resolved Ambulatory Problems     No Additional Past Medical History       Admitting Diagnosis: Hypokalemia [E87 6]  Carpopedal spasm [R29 0]  Weakness [R53 1]  Muscle weakness of lower extremity [M62 81]  Muscle weakness of left upper extremity [M62 81]  Generalized muscle ache [M79 1]    Age/Sex: 28 y o  female    Assessment/Plan:   * Hypokalemia   Assessment & Plan     -place in 23-hour observation, med/surg level of care with telemetry monitoring in the setting of hypokalemia  -hypokalemia is likely multifactorial secondary the patient changing her diet over the last few weeks, secondary to dehydration, and with a history of gastric bypass  -replete with p o  and IV potassium chloride supplementation  -recheck a BMP and magnesium level at 2200 this evening  -consult Nephrology        Left-sided weakness   Assessment & Plan     -likely secondary to hypokalemia  -neurologic checks every 4 hours  -if the weakness does not improve with potassium supplementation, she will need a stroke workup        Non-traumatic rhabdomyolysis   Assessment & Plan     -IV fluids  -follow the total CK (creatinine kinase) level  -follow the renal function        History of migraine headaches   Assessment & Plan     -continue topamax and amitriptyline          History of gastric bypass   Assessment & Plan     -likely contributing to the hypokalemia           VTE Prophylaxis: Enoxaparin (Lovenox)  / sequential compression device   Code Status:  Level 1-Full Code   Anticipated Length of Stay:  Patient will be admitted on an Observation basis with an anticipated length of stay of less than 2 midnights  Justification for Hospital Stay:  The patient requires IV potassium chloride supplementation, IV fluids, and serial laboratory testing to monitor her electrolytes and total CK (creatinine kinase) level        Admission Orders:  Scheduled Meds:   Current Facility-Administered Medications:  enoxaparin 40 mg Subcutaneous Q24H [facility], DO    ondansetron 4 mg Intravenous Q6H PRN [facility], DO    potassium chloride 20 mEq Intravenous Once Ajit Moore PA-C Last Rate: 20 mEq (07/08/18 0541)   sodium chloride 0 9 % with KCl 40 mEq/L 125 mL/hr Intravenous Continuous Julia Raymond, DO Last Rate: 125 mL/hr (07/07/18 2010)     Continuous Infusions:   sodium chloride 0 9 % with KCl 40 mEq/L 125 mL/hr Last Rate: 125 mL/hr (07/07/18 2010)     PRN Meds: ondansetron    Daily eight   I&O   SCD  Tele   PT OT eval   Up w/ assist   Nephrology consult   Neuro checks   K  2 3, cl   110    IM note   7/8         * Hypokalemia   Assessment & Plan     -persistent, severe hypokalemia  -hypokalemia is likely multifactorial secondary the patient changing her diet over the last few weeks, secondary to dehydration, and with a history of gastric bypass  -continue to replete with p o  and IV potassium chloride supplementation  -recheck a BMP at 1600 today, 07/08/2018  -consult Nephrology  -continue telemetry monitoring          Left-sided weakness   Assessment & Plan     -likely secondary to hypokalemia  -neurologic checks every 4 hours  -check an MRI of the brain with without contrast and an MRA of the head without contrast  -check a bilateral carotid duplex  -PT/OT          Non-traumatic rhabdomyolysis   Assessment & Plan     -improving  -continue IV fluids  -continue to follow the total CK (creatinine kinase) level  -continue to follow the renal function          Transaminitis   Assessment & Plan     -likely secondary to rhabdomyolysis  -improved this morning  -follow the liver function tests  -check an acute hepatitis panel          History of migraine headaches   Assessment & Plan     -continue topamax and amitriptyline          History of gastric bypass   Assessment & Plan     -likely contributing to the hypokalemia  -outpatient follow-up with her gastric bypass surgeon                VTE Pharmacologic Prophylaxis:   Pharmacologic: Enoxaparin (Lovenox)  Mechanical VTE Prophylaxis in Place: Yes     Patient Centered Rounds: I have performed bedside rounds with nursing staff today         Time Spent for Care: 30 minutes    More than 50% of total time spent on counseling and coordination of care as described above      Current Length of Stay: 0 day(s)     Current Patient Status: Inpatient   Certification Statement: The patient, admitted on an observation basis, will now require > 2 midnight hospital stay due to the need for IV potassium chloride replacement therapy, serial laboratory testing to monitor her potassium level, telemetry monitoring, and for an MRI/MRA of the brain    She will be made INPATIENT ADMISSION status

## 2018-07-08 NOTE — PROGRESS NOTES
I was called by the nurse to evaluate the patient due to the patient having complaints of a "locked" right hand that she is unable to move  This is likely due to the hypokalemia  She is currently receiving IV Potassium with a repeat BMP at 1600  Will give Robaxin 500 mg PO and Ativan 0 5 mg IV now

## 2018-07-08 NOTE — PROGRESS NOTES
Able to move right 3rd, 4th,and 5th finger at this time    Thumb and index finger remain locked   K Pad with warmth had been applied  Able to raise right arm off pillow at this time  Stated decreased sensation in right arm continues

## 2018-07-08 NOTE — PROGRESS NOTES
Progress Note - Karlee Whelan 1985, 28 y o  female MRN: [id number]    Unit/Bed#: 414-01 Encounter: [id number]    Primary Care Provider: Blake Ferrer MD   Date and time admitted to hospital: 7/7/2018  3:41 PM        * Hypokalemia   Assessment & Plan    -persistent, severe hypokalemia  -hypokalemia is likely multifactorial secondary the patient changing her diet over the last few weeks, secondary to dehydration, and with a history of gastric bypass  -continue to replete with p o  and IV potassium chloride supplementation  -recheck a BMP at 1600 today, 07/08/2018  -consult Nephrology  -continue telemetry monitoring        Left-sided weakness   Assessment & Plan    -likely secondary to hypokalemia  -neurologic checks every 4 hours  -check an MRI of the brain with without contrast and an MRA of the head without contrast  -check a bilateral carotid duplex  -PT/OT        Non-traumatic rhabdomyolysis   Assessment & Plan    -improving  -continue IV fluids  -continue to follow the total CK (creatinine kinase) level  -continue to follow the renal function        Transaminitis   Assessment & Plan    -likely secondary to rhabdomyolysis  -improved this morning  -follow the liver function tests  -check an acute hepatitis panel        History of migraine headaches   Assessment & Plan    -continue topamax and amitriptyline        History of gastric bypass   Assessment & Plan    -likely contributing to the hypokalemia  -outpatient follow-up with her gastric bypass surgeon            VTE Pharmacologic Prophylaxis:   Pharmacologic: Enoxaparin (Lovenox)  Mechanical VTE Prophylaxis in Place: Yes    Patient Centered Rounds: I have performed bedside rounds with nursing staff today  Time Spent for Care: 30 minutes  More than 50% of total time spent on counseling and coordination of care as described above      Current Length of Stay: 0 day(s)    Current Patient Status: Inpatient   Certification Statement: The patient, admitted on an observation basis, will now require > 2 midnight hospital stay due to the need for IV potassium chloride replacement therapy, serial laboratory testing to monitor her potassium level, telemetry monitoring, and for an MRI/MRA of the brain  She will be made INPATIENT ADMISSION status  Code Status: Level 1 - Full Code      Subjective: The patient was seen examined  She continues to complain of left lower extremity weakness  She also complains of bilateral hand weakness  Objective:     Vitals:   Temp (24hrs), Av 7 °F (36 5 °C), Min:97 °F (36 1 °C), Max:98 5 °F (36 9 °C)    HR:  [76-88] 80  Resp:  [17-18] 18  BP: (104-129)/(52-72) 104/52  SpO2:  [95 %-100 %] 95 %  Body mass index is 26 63 kg/m²  Input and Output Summary (last 24 hours): Intake/Output Summary (Last 24 hours) at 18 1010  Last data filed at 18 0851   Gross per 24 hour   Intake             1570 ml   Output                0 ml   Net             1570 ml       Physical Exam:     Physical Exam  General:  NAD, awake, alert, oriented x 3  HEENT:  NC/AT, mucous membranes dry  Neck:  Supple, No JVP elevation  CV:  + S1, + S2, RRR  Pulm:  Lung fields are CTA bilaterally  Abd:  Soft, Non-tender, Non-distended  Ext:  No clubbing/cyanosis/edema  Skin:  No rashes      Additional Data:     Labs:      Results from last 7 days  Lab Units 18  0432   WBC Thousand/uL 7 78   HEMOGLOBIN g/dL 10 8*   HEMATOCRIT % 30 4*   PLATELETS Thousands/uL 251   NEUTROS PCT % 45   LYMPHS PCT % 42   MONOS PCT % 9   EOS PCT % 3       Results from last 7 days  Lab Units 18  0430   SODIUM mmol/L 144   POTASSIUM mmol/L 2 3*   CHLORIDE mmol/L 110*   CO2 mmol/L 29   BUN mg/dL 12   CREATININE mg/dL 0 82   CALCIUM mg/dL 7 0*   TOTAL PROTEIN g/dL 5 0*   BILIRUBIN TOTAL mg/dL 0 20   ALK PHOS U/L 39*   ALT U/L 38   AST U/L 45   GLUCOSE RANDOM mg/dL 82                     * I Have Reviewed All Lab Data Listed Above    * Additional Pertinent Lab Tests Reviewed: [facility] Review[facility]        Recent Cultures (last 7 days):           Last 24 Hours Medication List:     Current Facility-Administered Medications:  amitriptyline 25 mg Oral HS Jagdeep Abel,     enoxaparin 40 mg Subcutaneous Q24H [employer], DO    ondansetron 4 mg Intravenous Q6H PRN [employer], DO    potassium chloride 40 mEq Oral TID AC Papo Carr,     potassium chloride 20 mEq Intravenous Q2H [employer], DO Last Rate: 20 mEq (07/08/18 0853)   sodium chloride 0 9 % with KCl 40 mEq/L 125 mL/hr Intravenous Continuous [employer], DO Last Rate: 125 mL/hr (07/07/18 2010)   topiramate 25 mg Oral Q12H 6225 Arlin Bird DO         Today, Patient Was Seen By: Jagdeep Abel DO    ** Please Note: Dictation voice to text software may have been used in the creation of this document   **

## 2018-07-08 NOTE — SOCIAL WORK
Case management met with the patient to assess the prior level of function and form a safe d/c plan  The patient was given and I reviewed the case management  discharge checklist with the patient  The patient is aware that the d/c planning starts on the day of admission and that if she has any questions or needs they  is to contact case management  The patient is independent at home with all adls and she uses no dme at home  The patient does drive and she has no services in the home  Her pcp is Dr Cecilia Sam and she uses [facility] pharmacy in [place]

## 2018-07-08 NOTE — ASSESSMENT & PLAN NOTE
-likely secondary to hypokalemia  -neurologic checks every 4 hours  -check an MRI of the brain with without contrast and an MRA of the head without contrast  -check a bilateral carotid duplex  -PT/OT

## 2018-07-08 NOTE — PROGRESS NOTES
Rang call bell   C/o "right arm feeling dead"  Pt stated "my hand is locked" Noted to be turned towards body  Pt stated "this started at 1:23 and this is how my other arm felt when this all started"  Renetta Bush PA-C made aware  Coming to see pt at this time

## 2018-07-08 NOTE — ASSESSMENT & PLAN NOTE
-persistent, severe hypokalemia  -hypokalemia is likely multifactorial secondary the patient changing her diet over the last few weeks, secondary to dehydration, and with a history of gastric bypass  -continue to replete with p o  and IV potassium chloride supplementation  -recheck a BMP at 1600 today, 07/08/2018  -consult Nephrology  -continue telemetry monitoring

## 2018-07-08 NOTE — CONSULTS
Consultation - Nephrology   Rj Amador 28 y o  female MRN: [id number]  Unit/Bed#: 414-01 Encounter: [id number]      A/P:  1  Severe Hypokalemia: agree with replacement no changes, will need to have serial labs   2  Rhabdomyolysis without fall or injury: her levels are improving with ivf  Continue to monitor  3  Muscle weakness/cramping: may be due to the severity of the hypokalemia, will continue with supportive care and additional labs will be ordered  4  Weight loss: she states she had recently changed her diet but denies any type of otc supplement for more rapid response  Thank you for allowing us to participate in the care of your patient  Please feel free to contact us regarding the care of this patient, or any other questions/concerns that may be applicable  Patient Active Problem List   Diagnosis    Non-traumatic rhabdomyolysis    Hypokalemia    History of gastric bypass    History of migraine headaches    Left-sided weakness    Transaminitis       History of Present Illness   Physician Requesting Consult: Jagdeep Abel DO  Reason for Consult / Principal Problem: Hypokalemia   Hx and PE limited by:   HPI: Rj Amador is a 28y o  year old female who presents with a history of gastric bypass several years ago who presented to the er after not feeling well at home  She states she felt very weak and fatigued and became concerned after having increased weakness and cramping of her left upper extremity  She was evaluated in the er and found to have profound hypokalemia  Currently she is being replaced with both oral and iv potassium  On examination today she is in no acute distress, pleasant and conversational  She denies any headache, vision change, dysphagia, neck pain, sob, palpitations, chest pain, abdominal pain, n/v/d  She states her left lower extremity feels week and now her right upper extremity is cramping       History obtained from the patient    Review of Systems - Negative except as mentioned above in HPI, more specifics as mentioned below  Review of Systems - Negative except as ,mentioned in the HPI    Historical Information   History reviewed  No pertinent past medical history  Past Surgical History:   Procedure Laterality Date    ABDOMINAL SURGERY      APPENDECTOMY      CHOLECYSTECTOMY      GASTRIC BYPASS      HYSTERECTOMY       Social History   History   Alcohol Use    Yes     Comment: socially     History   Drug Use No     History   Smoking Status    Never Smoker   Smokeless Tobacco    Never Used     History reviewed  No pertinent family history  Meds/Allergies   all current active meds have been reviewed, current meds: Current Facility-Administered Medications   Medication Dose Route Frequency    amitriptyline (ELAVIL) tablet 25 mg  25 mg Oral HS    enoxaparin (LOVENOX) subcutaneous injection 40 mg  40 mg Subcutaneous Q24H    ondansetron (ZOFRAN) injection 4 mg  4 mg Intravenous Q6H PRN    sodium chloride 0 9 % with KCl 40 mEq/L infusion (premix)  125 mL/hr Intravenous Continuous    topiramate (TOPAMAX) tablet 25 mg  25 mg Oral Q12H [address]    and PTA meds:  Prescriptions Prior to Admission   Medication    amitriptyline (ELAVIL) 100 mg tablet    meclizine (ANTIVERT) 25 mg tablet    topiramate (TOPAMAX) 50 MG tablet         No Known Allergies    Objective     Intake/Output Summary (Last 24 hours) at 07/08/18 1403  Last data filed at 07/08/18 0851   Gross per 24 hour   Intake             1570 ml   Output                0 ml   Net             1570 ml       Invasive Devices:        Physical Exam      I/O last 3 completed shifts: In: 5908 [P O :240;  I V :970]  Out: -     Vitals:    07/08/18 0727   BP: 104/52   Pulse: 80   Resp: 18   Temp: 97 6 °F (36 4 °C)   SpO2: 95%       Gen: in NAD, Alert/Awake  HEENT: no sclerous icterus, MMM, neck supple  CV: +S1/S2, RRR  Lungs: CTA bilaterally  Abd: +BS, soft NT/ND  Ext: all four extremities are warm  Skin: no rashes noted  Neuro: CN II-XII intact positive left lower ext weakness 4/5  Positive right upper extremity with cramping and immobility,     Current Weight: Weight - Scale: 68 2 kg (150 lb 5 7 oz)  First Weight: Weight - Scale: 64 9 kg (143 lb)    Lab Results:  I have personally reviewed pertinent labs      CBC: Lab Results   Component Value Date    WBC 7 78 07/08/2018    HGB 10 8 (L) 07/08/2018    HCT 30 4 (L) 07/08/2018    MCV 87 07/08/2018     07/08/2018    MCH 30 9 07/08/2018    MCHC 35 5 07/08/2018    RDW 14 5 07/08/2018    MPV 10 4 07/08/2018     CMP: Lab Results   Component Value Date     07/08/2018    K 2 3 (LL) 07/08/2018     (H) 07/08/2018    CO2 29 07/08/2018    ANIONGAP 5 07/08/2018    BUN 12 07/08/2018    CREATININE 0 82 07/08/2018    GLUCOSE 82 07/08/2018    CALCIUM 7 0 (L) 07/08/2018    AST 45 07/08/2018    ALT 38 07/08/2018    ALKPHOS 39 (L) 07/08/2018    PROT 5 0 (L) 07/08/2018    BILITOT 0 20 07/08/2018    EGFR 94 07/08/2018     Phosphorus:   Lab Results   Component Value Date    PHOS 2 7 07/08/2018     Magnesium:   Lab Results   Component Value Date    MG 2 4 07/08/2018     Urinalysis: Lab Results   Component Value Date    COLORU Yellow 07/07/2018    CLARITYU Clear 07/07/2018    SPECGRAV 1 015 07/07/2018    PHUR 6 5 07/07/2018    LEUKOCYTESUR Negative 07/07/2018    NITRITE Negative 07/07/2018    PROTEINUA Negative 07/07/2018    GLUCOSEU Negative 07/07/2018    KETONESU Negative 07/07/2018    BILIRUBINUR Negative 07/07/2018    BLOODU Negative 07/07/2018     Ionized Calcium: No results found for: CAION  Coagulation: No results found for: PT, INR, APTT  Troponin: No results found for: TROPONINI  ABG: No results found for: PHART, PDQ0AHF, PO2ART, ZJA0QDV, Q5QKTPIK, BEART, SOURCE      Results from last 7 days  Lab Units 07/08/18  0430 07/07/18  2227 07/07/18  1623   SODIUM mmol/L 144 143 142   POTASSIUM mmol/L 2 3* 1 8* 1 8*   CHLORIDE mmol/L 110* 108 102   CO2 mmol/L 29 26 31   BUN mg/dL 12 11 13   CREATININE mg/dL 0 82 1 17 1 09   CALCIUM mg/dL 7 0* 7 7* 8 7   TOTAL PROTEIN g/dL 5 0*  --  7 3   BILIRUBIN TOTAL mg/dL 0 20  --  0 20   ALK PHOS U/L 39*  --  59   ALT U/L 38  --  61   AST U/L 45  --  70*   GLUCOSE RANDOM mg/dL 82 107 88       Radiology review:  Procedure: Ct Head Without Contrast    Result Date: 7/7/2018  Narrative: CT BRAIN - WITHOUT CONTRAST INDICATION:   left hand weakness- fine and gross motor 3 weeks, left leg weakness gross motor  lightheaded    COMPARISON:  None  TECHNIQUE:  CT examination of the brain was performed  In addition to axial images, coronal 2D reformatted images were created and submitted for interpretation  Radiation dose length product (DLP) for this visit:  968 mGy-cm   This examination, like all CT scans performed in the [facility], was performed utilizing techniques to minimize radiation dose exposure, including the use of iterative reconstruction and automated exposure control  IMAGE QUALITY:  Diagnostic  FINDINGS: PARENCHYMA:  No intracranial mass, mass effect or midline shift  No CT signs of acute infarction  No acute parenchymal hemorrhage  VENTRICLES AND EXTRA-AXIAL SPACES:  Normal for the patient's age  VISUALIZED ORBITS AND PARANASAL SINUSES:  Unremarkable  CALVARIUM AND EXTRACRANIAL SOFT TISSUES:  Normal      Impression: No acute intracranial abnormality  Workstation performed: [id number]     Procedure: Vas Carotid Complete Study    Result Date: 7/8/2018  Narrative:  [facility] REPORT CLINICAL: Indications:  Left sided weakness and tingling  Risk Factors The patient has no history of HTN, Diabetes or hyperlipidemia  Clinical Right Pressure:  104/52 mm Hg, Left Pressure:  not obtained due to IV  FINDINGS:  Right        Impression  PSV  EDV (cm/s)  Direction of Flow  Ratio  Dist  ICA                 87          40                      1 00  Mid  ICA                  82          42                      0 95  Prox   ICA    Normal 81          31                      0 93  Dist CCA                  94          29                            Mid CCA                   87          14                      0 77  Prox CCA                 112          21                            Ext Carotid              105          21                      1 21  Prox Vert                 84          19  Antegrade                 Subclavian               108           0                             Left         Impression  PSV  EDV (cm/s)  Direction of Flow  Ratio  Mid  ICA                  78          34                      0 61  Prox  ICA    Normal       83          33                      0 65  Dist CCA                 114          28                            Mid CCA                  128          33                      1 07  Prox CCA                 120          33                            Ext Carotid               79          19                      0 61  Prox Vert                 69          25  Antegrade                 Subclavian               137          13                               CONCLUSION: Impression RIGHT: There is no evidence of arterial disease throughout the extracranial carotid system  Vertebral artery flow is antegrade  There is no significant subclavian artery disease  LEFT: There is no evidence of arterial disease throughout the extracranial carotid system  Vertebral artery flow is antegrade  There is no significant subclavian artery disease  Internal carotid artery stenosis determination by consensus criteria from: Ivis Upton et al  Carotid Artery Stenosis: Gray-Scale and Doppler US Diagnosis - [employer] in [address] [place] [address], Radiology 2003; [phone]    SIGNATURE: Electronically Signed by: Caren Lopez MD, [address] on 2018-07-08 01:34:47 PM        EKG, Pathology, and Other Studies: I have reviewed pertinent studies       Counseling / Coordination of Care  Total floor / unit time spent today 70 minutes  Greater than 50% of total time was spent with the patient and / or family counseling and / or coordination of care   A description of the counseling / coordination of care:     ARIADNA Bravo

## 2018-07-08 NOTE — ASSESSMENT & PLAN NOTE
-likely secondary to rhabdomyolysis  -improved this morning  -follow the liver function tests  -check an acute hepatitis panel

## 2018-07-08 NOTE — ASSESSMENT & PLAN NOTE
-improving  -continue IV fluids  -continue to follow the total CK (creatinine kinase) level  -continue to follow the renal function

## 2018-07-09 ENCOUNTER — APPOINTMENT (INPATIENT)
Dept: MRI IMAGING | Facility: HOSPITAL | Age: 33
DRG: 422 | End: 2018-07-09
Payer: COMMERCIAL

## 2018-07-09 PROBLEM — E83.51 HYPOCALCEMIA: Status: ACTIVE | Noted: 2018-07-09

## 2018-07-09 PROBLEM — E83.39 HYPOPHOSPHATEMIA: Status: ACTIVE | Noted: 2018-07-09

## 2018-07-09 PROBLEM — E87.0 HYPERNATREMIA: Status: ACTIVE | Noted: 2018-07-09

## 2018-07-09 LAB
ALBUMIN SERPL BCP-MCNC: 2.2 G/DL (ref 3.5–5)
ALP SERPL-CCNC: 32 U/L (ref 46–116)
ALT SERPL W P-5'-P-CCNC: 35 U/L (ref 12–78)
ANION GAP SERPL CALCULATED.3IONS-SCNC: 5 MMOL/L (ref 4–13)
ANION GAP SERPL CALCULATED.3IONS-SCNC: 8 MMOL/L (ref 4–13)
AST SERPL W P-5'-P-CCNC: 34 U/L (ref 5–45)
B-HCG SERPL-ACNC: <2 MIU/ML
BASOPHILS # BLD AUTO: 0.03 THOUSANDS/ΜL (ref 0–0.1)
BASOPHILS NFR BLD AUTO: 0 % (ref 0–1)
BILIRUB SERPL-MCNC: 0.1 MG/DL (ref 0.2–1)
BUN SERPL-MCNC: 7 MG/DL (ref 5–25)
BUN SERPL-MCNC: 8 MG/DL (ref 5–25)
CA-I BLD-SCNC: 1.08 MMOL/L (ref 1.12–1.32)
CALCIUM SERPL-MCNC: 7.4 MG/DL (ref 8.3–10.1)
CALCIUM SERPL-MCNC: 7.5 MG/DL (ref 8.3–10.1)
CHLORIDE SERPL-SCNC: 112 MMOL/L (ref 100–108)
CHLORIDE SERPL-SCNC: 113 MMOL/L (ref 100–108)
CHLORIDE UR-SCNC: 159 MMOL/L (ref 10–330)
CK MB SERPL-MCNC: 2.1 NG/ML (ref 0–5)
CK MB SERPL-MCNC: <1 % (ref 0–2.5)
CK SERPL-CCNC: 546 U/L (ref 26–192)
CO2 SERPL-SCNC: 24 MMOL/L (ref 21–32)
CO2 SERPL-SCNC: 28 MMOL/L (ref 21–32)
CREAT SERPL-MCNC: 0.77 MG/DL (ref 0.6–1.3)
CREAT SERPL-MCNC: 0.82 MG/DL (ref 0.6–1.3)
CREAT UR-MCNC: 76 MG/DL
EOSINOPHIL # BLD AUTO: 0.22 THOUSAND/ΜL (ref 0–0.61)
EOSINOPHIL NFR BLD AUTO: 3 % (ref 0–6)
ERYTHROCYTE [DISTWIDTH] IN BLOOD BY AUTOMATED COUNT: 15.1 % (ref 11.6–15.1)
GFR SERPL CREATININE-BSD FRML MDRD: 101 ML/MIN/1.73SQ M
GFR SERPL CREATININE-BSD FRML MDRD: 94 ML/MIN/1.73SQ M
GLUCOSE SERPL-MCNC: 124 MG/DL (ref 65–140)
GLUCOSE SERPL-MCNC: 78 MG/DL (ref 65–140)
HAV IGM SER QL: NORMAL
HBV CORE IGM SER QL: NORMAL
HBV SURFACE AG SER QL: NORMAL
HCT VFR BLD AUTO: 31.1 % (ref 34.8–46.1)
HCV AB SER QL: NORMAL
HGB BLD-MCNC: 10.3 G/DL (ref 11.5–15.4)
LYMPHOCYTES # BLD AUTO: 2.8 THOUSANDS/ΜL (ref 0.6–4.47)
LYMPHOCYTES NFR BLD AUTO: 40 % (ref 14–44)
MAGNESIUM SERPL-MCNC: 2.2 MG/DL (ref 1.6–2.6)
MCH RBC QN AUTO: 30.2 PG (ref 26.8–34.3)
MCHC RBC AUTO-ENTMCNC: 33.1 G/DL (ref 31.4–37.4)
MCV RBC AUTO: 91 FL (ref 82–98)
MONOCYTES # BLD AUTO: 0.5 THOUSAND/ΜL (ref 0.17–1.22)
MONOCYTES NFR BLD AUTO: 7 % (ref 4–12)
NEUTROPHILS # BLD AUTO: 3.54 THOUSANDS/ΜL (ref 1.85–7.62)
NEUTS SEG NFR BLD AUTO: 50 % (ref 43–75)
PHOSPHATE SERPL-MCNC: 2.3 MG/DL (ref 2.7–4.5)
PHOSPHATE SERPL-MCNC: 2.4 MG/DL (ref 2.7–4.5)
PLATELET # BLD AUTO: 247 THOUSANDS/UL (ref 149–390)
PMV BLD AUTO: 11 FL (ref 8.9–12.7)
POTASSIUM SERPL-SCNC: 3 MMOL/L (ref 3.5–5.3)
POTASSIUM SERPL-SCNC: 3.9 MMOL/L (ref 3.5–5.3)
POTASSIUM UR-SCNC: 12.4 MMOL/L (ref 1–300)
PROT SERPL-MCNC: 4.7 G/DL (ref 6.4–8.2)
RBC # BLD AUTO: 3.41 MILLION/UL (ref 3.81–5.12)
SODIUM 24H UR-SCNC: 72 MOL/L
SODIUM SERPL-SCNC: 144 MMOL/L (ref 136–145)
SODIUM SERPL-SCNC: 146 MMOL/L (ref 136–145)
WBC # BLD AUTO: 7.09 THOUSAND/UL (ref 4.31–10.16)

## 2018-07-09 PROCEDURE — 85025 COMPLETE CBC W/AUTO DIFF WBC: CPT | Performed by: INTERNAL MEDICINE

## 2018-07-09 PROCEDURE — G8987 SELF CARE CURRENT STATUS: HCPCS

## 2018-07-09 PROCEDURE — 97162 PT EVAL MOD COMPLEX 30 MIN: CPT | Performed by: PHYSICAL THERAPIST

## 2018-07-09 PROCEDURE — 80053 COMPREHEN METABOLIC PANEL: CPT | Performed by: INTERNAL MEDICINE

## 2018-07-09 PROCEDURE — G8988 SELF CARE GOAL STATUS: HCPCS

## 2018-07-09 PROCEDURE — 83735 ASSAY OF MAGNESIUM: CPT | Performed by: INTERNAL MEDICINE

## 2018-07-09 PROCEDURE — 80048 BASIC METABOLIC PNL TOTAL CA: CPT | Performed by: INTERNAL MEDICINE

## 2018-07-09 PROCEDURE — 82330 ASSAY OF CALCIUM: CPT | Performed by: INTERNAL MEDICINE

## 2018-07-09 PROCEDURE — 84133 ASSAY OF URINE POTASSIUM: CPT | Performed by: INTERNAL MEDICINE

## 2018-07-09 PROCEDURE — G8979 MOBILITY GOAL STATUS: HCPCS | Performed by: PHYSICAL THERAPIST

## 2018-07-09 PROCEDURE — A9585 GADOBUTROL INJECTION: HCPCS | Performed by: INTERNAL MEDICINE

## 2018-07-09 PROCEDURE — 84100 ASSAY OF PHOSPHORUS: CPT | Performed by: INTERNAL MEDICINE

## 2018-07-09 PROCEDURE — 82553 CREATINE MB FRACTION: CPT | Performed by: INTERNAL MEDICINE

## 2018-07-09 PROCEDURE — 82550 ASSAY OF CK (CPK): CPT | Performed by: INTERNAL MEDICINE

## 2018-07-09 PROCEDURE — G8978 MOBILITY CURRENT STATUS: HCPCS | Performed by: PHYSICAL THERAPIST

## 2018-07-09 PROCEDURE — 84702 CHORIONIC GONADOTROPIN TEST: CPT | Performed by: INTERNAL MEDICINE

## 2018-07-09 PROCEDURE — 70553 MRI BRAIN STEM W/O & W/DYE: CPT

## 2018-07-09 PROCEDURE — 82436 ASSAY OF URINE CHLORIDE: CPT | Performed by: PHYSICIAN ASSISTANT

## 2018-07-09 PROCEDURE — 82570 ASSAY OF URINE CREATININE: CPT | Performed by: INTERNAL MEDICINE

## 2018-07-09 PROCEDURE — 84300 ASSAY OF URINE SODIUM: CPT | Performed by: INTERNAL MEDICINE

## 2018-07-09 PROCEDURE — 70544 MR ANGIOGRAPHY HEAD W/O DYE: CPT

## 2018-07-09 PROCEDURE — 99232 SBSQ HOSP IP/OBS MODERATE 35: CPT | Performed by: INTERNAL MEDICINE

## 2018-07-09 PROCEDURE — 97166 OT EVAL MOD COMPLEX 45 MIN: CPT

## 2018-07-09 RX ORDER — METHOCARBAMOL 500 MG/1
750 TABLET, FILM COATED ORAL ONCE
Status: COMPLETED | OUTPATIENT
Start: 2018-07-09 | End: 2018-07-09

## 2018-07-09 RX ORDER — POTASSIUM CHLORIDE 20 MEQ/1
40 TABLET, EXTENDED RELEASE ORAL ONCE
Status: DISCONTINUED | OUTPATIENT
Start: 2018-07-09 | End: 2018-07-09

## 2018-07-09 RX ORDER — POTASSIUM CHLORIDE AND SODIUM CHLORIDE 450; 150 MG/100ML; MG/100ML
100 INJECTION, SOLUTION INTRAVENOUS CONTINUOUS
Status: DISCONTINUED | OUTPATIENT
Start: 2018-07-09 | End: 2018-07-10 | Stop reason: HOSPADM

## 2018-07-09 RX ORDER — POTASSIUM CHLORIDE 20 MEQ/1
40 TABLET, EXTENDED RELEASE ORAL
Status: COMPLETED | OUTPATIENT
Start: 2018-07-09 | End: 2018-07-09

## 2018-07-09 RX ORDER — METHOCARBAMOL 500 MG/1
750 TABLET, FILM COATED ORAL EVERY 6 HOURS PRN
Status: DISCONTINUED | OUTPATIENT
Start: 2018-07-09 | End: 2018-07-10 | Stop reason: HOSPADM

## 2018-07-09 RX ORDER — LORAZEPAM 2 MG/ML
0.5 INJECTION INTRAMUSCULAR ONCE
Status: COMPLETED | OUTPATIENT
Start: 2018-07-09 | End: 2018-07-09

## 2018-07-09 RX ORDER — POTASSIUM CHLORIDE 14.9 MG/ML
20 INJECTION INTRAVENOUS
Status: COMPLETED | OUTPATIENT
Start: 2018-07-09 | End: 2018-07-09

## 2018-07-09 RX ORDER — CHOLECALCIFEROL (VITAMIN D3) 125 MCG
500 CAPSULE ORAL DAILY
Status: DISCONTINUED | OUTPATIENT
Start: 2018-07-10 | End: 2018-07-10 | Stop reason: HOSPADM

## 2018-07-09 RX ORDER — POTASSIUM CHLORIDE 20 MEQ/1
40 TABLET, EXTENDED RELEASE ORAL
Status: DISCONTINUED | OUTPATIENT
Start: 2018-07-09 | End: 2018-07-09

## 2018-07-09 RX ORDER — KETOROLAC TROMETHAMINE 30 MG/ML
15 INJECTION, SOLUTION INTRAMUSCULAR; INTRAVENOUS ONCE
Status: COMPLETED | OUTPATIENT
Start: 2018-07-09 | End: 2018-07-09

## 2018-07-09 RX ORDER — CALCIUM CARBONATE 500(1250)
1 TABLET ORAL
Status: DISCONTINUED | OUTPATIENT
Start: 2018-07-09 | End: 2018-07-10

## 2018-07-09 RX ADMIN — DIBASIC SODIUM PHOSPHATE, MONOBASIC POTASSIUM PHOSPHATE AND MONOBASIC SODIUM PHOSPHATE 2 TABLET: 852; 155; 130 TABLET ORAL at 08:46

## 2018-07-09 RX ADMIN — TOPIRAMATE 25 MG: 25 TABLET, FILM COATED ORAL at 22:00

## 2018-07-09 RX ADMIN — Medication 1 TABLET: at 08:46

## 2018-07-09 RX ADMIN — METHOCARBAMOL 750 MG: 500 TABLET ORAL at 10:50

## 2018-07-09 RX ADMIN — ENOXAPARIN SODIUM 40 MG: 40 INJECTION, SOLUTION INTRAVENOUS; SUBCUTANEOUS at 18:37

## 2018-07-09 RX ADMIN — POTASSIUM CHLORIDE 40 MEQ: 1500 TABLET, EXTENDED RELEASE ORAL at 12:39

## 2018-07-09 RX ADMIN — METHOCARBAMOL 750 MG: 500 TABLET ORAL at 21:59

## 2018-07-09 RX ADMIN — POTASSIUM CHLORIDE 40 MEQ: 1500 TABLET, EXTENDED RELEASE ORAL at 08:46

## 2018-07-09 RX ADMIN — LORAZEPAM 0.5 MG: 2 INJECTION INTRAMUSCULAR; INTRAVENOUS at 15:56

## 2018-07-09 RX ADMIN — GADOBUTROL 6 ML: 604.72 INJECTION INTRAVENOUS at 08:30

## 2018-07-09 RX ADMIN — POTASSIUM CHLORIDE AND SODIUM CHLORIDE 100 ML/HR: 450; 150 INJECTION, SOLUTION INTRAVENOUS at 23:29

## 2018-07-09 RX ADMIN — METHOCARBAMOL 750 MG: 500 TABLET ORAL at 15:56

## 2018-07-09 RX ADMIN — DIBASIC SODIUM PHOSPHATE, MONOBASIC POTASSIUM PHOSPHATE AND MONOBASIC SODIUM PHOSPHATE 2 TABLET: 852; 155; 130 TABLET ORAL at 15:57

## 2018-07-09 RX ADMIN — POTASSIUM CHLORIDE 20 MEQ: 200 INJECTION, SOLUTION INTRAVENOUS at 10:50

## 2018-07-09 RX ADMIN — CALCIUM GLUCONATE 1 G: 94 INJECTION, SOLUTION INTRAVENOUS at 12:34

## 2018-07-09 RX ADMIN — POTASSIUM CHLORIDE 20 MEQ: 200 INJECTION, SOLUTION INTRAVENOUS at 08:45

## 2018-07-09 RX ADMIN — AMITRIPTYLINE HYDROCHLORIDE 25 MG: 25 TABLET, FILM COATED ORAL at 22:00

## 2018-07-09 RX ADMIN — LORAZEPAM 0.5 MG: 2 INJECTION INTRAMUSCULAR; INTRAVENOUS at 11:24

## 2018-07-09 RX ADMIN — KETOROLAC TROMETHAMINE 15 MG: 30 INJECTION, SOLUTION INTRAMUSCULAR at 20:01

## 2018-07-09 RX ADMIN — TOPIRAMATE 25 MG: 25 TABLET, FILM COATED ORAL at 08:46

## 2018-07-09 RX ADMIN — POTASSIUM CHLORIDE AND SODIUM CHLORIDE 100 ML/HR: 450; 150 INJECTION, SOLUTION INTRAVENOUS at 08:45

## 2018-07-09 RX ADMIN — POTASSIUM CHLORIDE AND SODIUM CHLORIDE 125 ML/HR: 900; 300 INJECTION, SOLUTION INTRAVENOUS at 00:00

## 2018-07-09 NOTE — ASSESSMENT & PLAN NOTE
-likely secondary to hypokalemia  -an MRI of the brain was completed today, 07/09/2018, with the following results/impression:  IMPRESSION:     Normal MRI of the brain  No acute intracranial pathology

## 2018-07-09 NOTE — PHYSICAL THERAPY NOTE
PHYSICAL THERAPY NOTE          Patient Name: Agueda Elizondo  UOZTN'C Date: 7/9/2018 07/09/18 0245   Note Type   Note type Eval only   Pain Assessment   Pain Assessment 0-10   Pain Score 5   Pain Type Acute pain   Pain Location Hand   Pain Orientation Right;Left   Home Living   Type of [address] One level   Bathroom Accessibility Accessible   Prior Function   Level of [place] Independent with ADLs and functional mobility   Lives With Spouse   ADL Assistance Independent   IADLs Independent   Falls in the last 6 months 0   Restrictions/Precautions   Weight Bearing Precautions Per Order No   Other Precautions Multiple lines;Telemetry; Fall Risk   Cognition   Orientation Level Oriented X4   Memory Within functional limits   Following Commands Follows all commands and directions without difficulty   RLE Assessment   RLE Assessment X   Strength RLE   RLE Overall Strength 4/5   LLE Assessment   LLE Assessment X   Strength LLE   LLE Overall Strength 4/5   Bed Mobility   Supine to Sit 5  Supervision   Sit to Supine 5  Supervision   Transfers   Sit to Stand 5  Supervision   Stand to Sit 5  Supervision   Ambulation/Elevation   Gait pattern Short stride; Inconsistent conner;Narrow LEYDI   Gait Assistance 5  Supervision   Assistive Device None   Distance 200'   Stair Management Assistance 5  Supervision   Additional items Assist x 1;Verbal cues   Stair Management Technique One rail R;Alternating pattern   Number of Stairs 3   Balance   Static Sitting Good   Ambulatory Fair +   Endurance Deficit   Endurance Deficit Yes   Activity Tolerance   Activity Tolerance Patient limited by fatigue   Assessment   Prognosis Good   Problem List Decreased strength;Decreased endurance;Decreased mobility   Goals   Patient Goals to go home   LTG Expiration Date 07/23/18   Long Term Goal #1 Increase BLE strength 1/2 grade;  Ambulate >500 ft Indp; Negotiate 8 stairs Sy with unilateral railing; Demonstrate Sy with all bed mobility and transfers      Recommendation   Recommendation Home independently   PT - OK to Discharge Yes

## 2018-07-09 NOTE — ASSESSMENT & PLAN NOTE
-give calcium gluconate 1 gram IV x 1 dose now  -initiate p o  calcium supplementation  -check a vitamin D 25-OH level

## 2018-07-09 NOTE — ASSESSMENT & PLAN NOTE
-likely secondary to rhabdomyolysis  -improved  -follow the liver function tests  -an acute hepatitis panel was completed with the following results:  Results for Eric Orona (MRN [id number]) as of 7/9/2018 12:56   Ref   Range 7/8/2018 04:30   HEPATITIS A IGM ANTIBODY Latest Ref Range: Non-reactive, Equivocal-Suggest Recollect  Non-reactive   HEPATITIS B SURFACE ANTIGEN Latest Ref Range: Non-reactive, NonReactive - Confirmed  Non-reactive   HEPATITIS B CORE IGM ANTIBODY Latest Ref Range: Non-reactive  Non-reactive   HEPATITIS C ANTIBODY Latest Ref Range: Non-reactive  Non-reactive

## 2018-07-09 NOTE — PROGRESS NOTES
Progress Note - Mark Jaramillo 1985, 28 y o  female MRN: [id number]    Unit/Bed#: 414-01 Encounter: [id number]    Primary Care Provider: Rj Parry MD   Date and time admitted to hospital: 7/7/2018  3:41 PM        * Hypokalemia   Assessment & Plan    -improving  -hypokalemia is likely multifactorial secondary the patient changing her diet over the last few weeks, secondary to dehydration, and with a history of gastric bypass  -continue to replete with p o  and IV potassium chloride supplementation  -I appreciate Nephrology's input  -continue to monitor her potassium level closely  -telemetry can be discontinued        Left-sided weakness   Assessment & Plan    -likely secondary to hypokalemia  -an MRI of the brain was completed today, 07/09/2018, with the following results/impression:  IMPRESSION:     Normal MRI of the brain  No acute intracranial pathology  Non-traumatic rhabdomyolysis   Assessment & Plan    -improving  -continue IV fluids  -continue to follow the total CK (creatinine kinase) level  -continue to follow the renal function        Hypernatremia   Assessment & Plan    -IV fluids were changed to 1/2 NSS with 20 meQ of KCl at 100 ml/hr  -follow the sodium level        Hypophosphatemia   Assessment & Plan    -replete with p o  phosphorus supplementation        Hypocalcemia   Assessment & Plan    -give calcium gluconate 1 gram IV x 1 dose now  -initiate p o  calcium supplementation  -check a vitamin D 25-OH level        Transaminitis   Assessment & Plan    -likely secondary to rhabdomyolysis  -improved  -follow the liver function tests  -an acute hepatitis panel was completed with the following results:  Results for Evita Serum (MRN [id number]) as of 7/9/2018 12:56   Ref   Range 7/8/2018 04:30   HEPATITIS A IGM ANTIBODY Latest Ref Range: Non-reactive, Equivocal-Suggest Recollect  Non-reactive   HEPATITIS B SURFACE ANTIGEN Latest Ref Range: Non-reactive, NonReactive - Confirmed Non-reactive   HEPATITIS B CORE IGM ANTIBODY Latest Ref Range: Non-reactive  Non-reactive   HEPATITIS C ANTIBODY Latest Ref Range: Non-reactive  Non-reactive           History of migraine headaches   Assessment & Plan    -continue topamax and amitriptyline  -topamax can contribute to hypokalemia        History of gastric bypass   Assessment & Plan    -likely contributing to the hypokalemia  -outpatient follow-up with her gastric bypass surgeon            VTE Pharmacologic Prophylaxis:   Pharmacologic: Enoxaparin (Lovenox)  Mechanical VTE Prophylaxis in Place: Yes    Patient Centered Rounds: I have performed bedside rounds with nursing staff today  Time Spent for Care: 30 minutes  More than 50% of total time spent on counseling and coordination of care as described above  Current Length of Stay: 1 day(s)    Current Patient Status: Inpatient   Certification Statement: The patient will continue to require additional inpatient hospital stay due to the need for IV potassium chloride supplementation, IV calcium gluconate, IV fluids, and serial laboratory testing to monitor her renal function and electrolytes  Code Status: Level 1 - Full Code      Subjective: The patient was seen and examined  The patient complains of severe spasms in her bilateral hands causing her hands to involuntarily clench  She is having trouble extending her fingers on both hands  Her left lower extremity weakness is improving  Objective:     Vitals:   Temp (24hrs), Av °F (36 7 °C), Min:97 9 °F (36 6 °C), Max:98 °F (36 7 °C)    HR:  [95] 95  Resp:  [18] 18  BP: (97-98)/(51-52) 97/52  SpO2:  [97 %-99 %] 99 %  Body mass index is 26 56 kg/m²  Input and Output Summary (last 24 hours):        Intake/Output Summary (Last 24 hours) at 18 1311  Last data filed at 18 1200   Gross per 24 hour   Intake             2890 ml   Output                0 ml   Net             2890 ml       Physical Exam:     Physical Exam  General:  NAD, awake, alert, oriented x 3  HEENT:  NC/AT, mucous membranes moist  Neck:  Supple, No JVP elevation  CV:  + S1, + S2, RRR  Pulm:  Lung fields are CTA bilaterally  Abd:  Soft, Non-tender, Non-distended  Ext:  No clubbing/cyanosis/edema  Skin:  No rashes  Neuro:  Decreased  strength in her bilateral hands and difficulty with extending her fingers on both hands      Additional Data:     Labs:      Results from last 7 days  Lab Units 07/09/18  0443   WBC Thousand/uL 7 09   HEMOGLOBIN g/dL 10 3*   HEMATOCRIT % 31 1*   PLATELETS Thousands/uL 247   NEUTROS PCT % 50   LYMPHS PCT % 40   MONOS PCT % 7   EOS PCT % 3       Results from last 7 days  Lab Units 07/09/18  0443   SODIUM mmol/L 146*   POTASSIUM mmol/L 3 0*   CHLORIDE mmol/L 113*   CO2 mmol/L 28   BUN mg/dL 8   CREATININE mg/dL 0 77   CALCIUM mg/dL 7 4*   TOTAL PROTEIN g/dL 4 7*   BILIRUBIN TOTAL mg/dL 0 10*   ALK PHOS U/L 32*   ALT U/L 35   AST U/L 34   GLUCOSE RANDOM mg/dL 78                     * I Have Reviewed All Lab Data Listed Above  * Additional Pertinent Lab Tests Reviewed: Veto 66 Admission Reviewed        Recent Cultures (last 7 days):           Last 24 Hours Medication List:     Current Facility-Administered Medications:  amitriptyline 25 mg Oral HS [facility], DO    calcium carbonate 1 tablet Oral Daily With Breakfast [facility], DO    [START ON 7/10/2018] cyanocobalamin 500 mcg Oral Daily [facility], DO    enoxaparin 40 mg Subcutaneous Q24H [facility], DO    ondansetron 4 mg Intravenous Q6H PRN [facility], DO    sodium chloride 0 45 % with KCl 20 mEq/L 100 mL/hr Intravenous Continuous Khushi Garcia MD Last Rate: 100 mL/hr (07/09/18 0845)   topiramate 25 mg Oral Q12H 6225 Arlin Bird DO         Today, Patient Was Seen By: [facility], DO    ** Please Note: Dictation voice to text software may have been used in the creation of this document  **

## 2018-07-09 NOTE — PROGRESS NOTES
Progress Note - Nephrology   Thanh Jean 28 y o  female MRN: [id number]  Unit/Bed#: 414-01 Encounter: [id number]    A/P:  1  Nontraumatic rhabdomyolysis: I have reviewed her recent history and there was no trauma  She walks and does mild lifting in her line of work but there was nothing unusual and she was not outside in the hot sun  She says she was well hydrated  She is feeling better  2  Hypokalemia: Improving with IV and oral restoration  She has not taken any diuretics or laxatives  She denies any GI etiology  She is taking topiramate which can cause hypokalemia and metabolic acidosis, however, she has been taking it for a year with no change in the dose  Hypokalemia will be evaluated with urinary electrolytes  She had low magnesium levels on admission which were repleted  3  History of gastric bypass: no history of malabsorption, however, might consider this  4  Muscle weakness due to electrolyte abnormalities      Follow up reason for today's visit: Hypokalemia    Hypokalemia    Patient Active Problem List   Diagnosis    Non-traumatic rhabdomyolysis    Hypokalemia    History of gastric bypass    History of migraine headaches    Left-sided weakness    Transaminitis    Hypocalcemia    Hypophosphatemia         Subjective:   She denies headache dizziness chest pain shortness of breath  She denies abdominal pain nausea vomiting or diarrhea  She denies dysuria  She states she did no heavy lifting or excessive activity 2 days prior to admission  She was in air conditioning and did not change her diet  She is not taking any supplements in 2 years  She denies any diuretic therapy  She drank enough water and had no diarrhea or change in urinary pattern  She does take topiramate 100 mg daily  But she has been doing this for a year for treatment for migraine after a concussion  She says she feels better today  She is receiving IV fluids with IV potassium repletion    She states her diet is protein with fresh fruits and vegetables  She tries to avoid carbohydrates  She had 172 lb after gastric bypass done 4 years ago  Objective:     Vitals: Blood pressure 97/52, pulse 95, temperature 98 °F (36 7 °C), temperature source Temporal, resp  rate 18, height 5' 3" (1 6 m), weight 68 kg (149 lb 14 6 oz), SpO2 99 %  ,Body mass index is 26 56 kg/m²  Weight (last 2 days)     Date/Time   Weight    07/09/18 0600  68 (149 91)    07/08/18 0600  68 2 (150 35)    07/07/18 1859  68 1 (150 13)    07/07/18 1545  64 9 (143)                Intake/Output Summary (Last 24 hours) at 07/09/18 0941  Last data filed at 07/09/18 0000   Gross per 24 hour   Intake             2450 ml   Output                0 ml   Net             2450 ml     I/O last 3 completed shifts:   In: 9846 [P O :1080; I V :2940]  Out: -          Physical Exam: BP 97/52 (BP Location: Right arm)   Pulse 95   Temp 98 °F (36 7 °C) (Temporal)   Resp 18   Ht 5' 3" (1 6 m)   Wt 68 kg (149 lb 14 6 oz)   SpO2 99%   BMI 26 56 kg/m²     General Appearance:    Alert, cooperative, no distress, appears stated age   Head:    Normocephalic, without obvious abnormality, atraumatic   Eyes:    Conjunctiva/corneas clear   Ears:    Normal external ears   Nose:   Nares normal, septum midline, mucosa normal, no drainage    or sinus tenderness   Throat:   Lips, mucosa, and tongue normal; teeth and gums normal   Neck:   Supple, symmetrical, trachea midline, no adenopathy;        thyroid:  No enlargement/tenderness/nodules; no carotid    bruit or JVD   Back:     Symmetric, no curvature, ROM normal, no CVA tenderness   Lungs:     Clear to auscultation bilaterally, respirations unlabored   Chest wall:    No tenderness or deformity   Heart:    Regular rate and rhythm, S1 and S2 normal, no murmur, rub   or gallop   Abdomen:     Soft, non-tender, bowel sounds active   Extremities:   Extremities normal, atraumatic, no cyanosis or edema   Skin:   Skin color, texture, turgor normal, no rashes or lesions   Lymph nodes:   Cervical normal   Neurologic:   CNII-XII intact            Lab, Imaging and other studies: I have personally reviewed pertinent labs  CBC: Lab Results   Component Value Date    WBC 7 09 07/09/2018    HGB 10 3 (L) 07/09/2018    HCT 31 1 (L) 07/09/2018    MCV 91 07/09/2018     07/09/2018    MCH 30 2 07/09/2018    MCHC 33 1 07/09/2018    RDW 15 1 07/09/2018    MPV 11 0 07/09/2018     CMP: Lab Results   Component Value Date     (H) 07/09/2018    K 3 0 (L) 07/09/2018     (H) 07/09/2018    CO2 28 07/09/2018    ANIONGAP 5 07/09/2018    BUN 8 07/09/2018    CREATININE 0 77 07/09/2018    GLUCOSE 78 07/09/2018    CALCIUM 7 4 (L) 07/09/2018    AST 34 07/09/2018    ALT 35 07/09/2018    ALKPHOS 32 (L) 07/09/2018    PROT 4 7 (L) 07/09/2018    BILITOT 0 10 (L) 07/09/2018    EGFR 101 07/09/2018         Results from last 7 days  Lab Units 07/09/18  0443 07/08/18  1619 07/08/18  0430  07/07/18  1623   SODIUM mmol/L 146* 145 144  < > 142   POTASSIUM mmol/L 3 0* 2 9* 2 3*  < > 1 8*   CHLORIDE mmol/L 113* 112* 110*  < > 102   CO2 mmol/L 28 29 29  < > 31   BUN mg/dL 8 10 12  < > 13   CREATININE mg/dL 0 77 0 78 0 82  < > 1 09   CALCIUM mg/dL 7 4* 7 4* 7 0*  < > 8 7   TOTAL PROTEIN g/dL 4 7*  --  5 0*  --  7 3   BILIRUBIN TOTAL mg/dL 0 10*  --  0 20  --  0 20   ALK PHOS U/L 32*  --  39*  --  59   ALT U/L 35  --  38  --  61   AST U/L 34  --  45  --  70*   GLUCOSE RANDOM mg/dL 78 92 82  < > 88   < > = values in this interval not displayed        Phosphorus:   Lab Results   Component Value Date    PHOS 2 4 (L) 07/09/2018     Magnesium:   Lab Results   Component Value Date    MG 2 2 07/09/2018     Urinalysis: No results found for: Harvey Cousin, SPECGRAV, PHUR, LEUKOCYTESUR, NITRITE, PROTEINUA, GLUCOSEU, KETONESU, BILIRUBINUR, BLOODU  Ionized Calcium: No results found for: CAION  Coagulation: No results found for: PT, INR, APTT  Troponin: No results found for: TROPONINI  ABG: No results found for: PHART, FIF2DHA, PO2ART, YKE1NLT, B3LEWKKT, BEART, SOURCE  Radiology review:     IMAGING  Procedure: Ct Head Without Contrast    Result Date: 7/7/2018  Narrative: CT BRAIN - WITHOUT CONTRAST INDICATION:   left hand weakness- fine and gross motor 3 weeks, left leg weakness gross motor  lightheaded    COMPARISON:  None  TECHNIQUE:  CT examination of the brain was performed  In addition to axial images, coronal 2D reformatted images were created and submitted for interpretation  Radiation dose length product (DLP) for this visit:  968 mGy-cm   This examination, like all CT scans performed in the [facility], was performed utilizing techniques to minimize radiation dose exposure, including the use of iterative reconstruction and automated exposure control  IMAGE QUALITY:  Diagnostic  FINDINGS: PARENCHYMA:  No intracranial mass, mass effect or midline shift  No CT signs of acute infarction  No acute parenchymal hemorrhage  VENTRICLES AND EXTRA-AXIAL SPACES:  Normal for the patient's age  VISUALIZED ORBITS AND PARANASAL SINUSES:  Unremarkable  CALVARIUM AND EXTRACRANIAL SOFT TISSUES:  Normal      Impression: No acute intracranial abnormality  Workstation performed: [id number]     Procedure: Mra And Or Mrv Head Wo Contrast    Result Date: 7/9/2018  Narrative: MRA BRAIN INDICATION:  LEFT SIDED WEAKNESS   TIA/CVA  COMPARISON:  None  TECHNIQUE:  Axial 3-D time-of-flight imaging with 3-D reconstructions  FINDINGS: IMAGE QUALITY:  Diagnostic  ANATOMY INTERNAL CAROTID ARTERIES:  Normal flow related enhancement of the distal cervical, petrous and cavernous segments of the internal carotid arteries  Normal ICA terminus  ANTERIOR CIRCULATION:  Normal A1 segments  Normal anterior communicating artery  Normal flow-related enhancement of the anterior cerebral arteries   MIDDLE CEREBRAL ARTERY CIRCULATION:  The M1 segment and middle cerebral artery branches demonstrate normal flow-related enhancement  DISTAL VERTEBRAL ARTERIES:  Distal left vertebral artery is dominant  The distal right vertebral artery is slightly hypoplastic but consistent in caliber without stenosis  BASILAR ARTERY:  Normal  POSTERIOR CEREBRAL ARTERIES:  Both posterior cerebral arteries arises from the basilar tip  Both arteries demonstrate normal flow-related enhancement  Normal posterior communicating arteries  Impression: Normal MR angiogram of the brain  Workstation performed: [id number]     Procedure: Mri Brain W Wo Contrast    Result Date: 7/9/2018  Narrative: MRI BRAIN WITH AND WITHOUT CONTRAST INDICATION: LEFT SIDED WEAKNESS  COMPARISON:  None  TECHNIQUE: Sagittal T1, axial T2, axial FLAIR, axial T1, axial Gradient, axial diffusion  Sagittal, axial and coronal T1 postcontrast   Axial BRAVO post contrast   IV Contrast:  6 cc of Gadavist injected intravenously without immediate consequence  IMAGE QUALITY:   Diagnostic  FINDINGS: BRAIN PARENCHYMA:  There is no discrete mass, mass effect or midline shift  No abnormal white matter signal identified  Brainstem and cerebellum demonstrate normal signal  There is no intracranial hemorrhage  There is no evidence of acute infarction and  diffusion imaging is unremarkable  Postcontrast imaging of the brain demonstrates no abnormal enhancement  VENTRICLES:  Normal  SELLA AND PITUITARY GLAND:  Normal  ORBITS:  Normal  PARANASAL SINUSES:  Normal  VASCULATURE:  Evaluation of the major intracranial vasculature demonstrates appropriate flow voids  CALVARIUM AND SKULL BASE:  Normal  EXTRACRANIAL SOFT TISSUES:  Normal      Impression: Normal MRI of the brain  No acute intracranial pathology  Workstation performed: [id number]     Procedure: Vas Carotid Complete Study    Result Date: 7/8/2018  Narrative:  [facility] REPORT CLINICAL: Indications:  Left sided weakness and tingling  Risk Factors The patient has no history of HTN, Diabetes or hyperlipidemia   Clinical Right Pressure:  104/52 mm Hg, Left Pressure:  not obtained due to IV  FINDINGS:  Right        Impression  PSV  EDV (cm/s)  Direction of Flow  Ratio  Dist  ICA                 87          40                      1 00  Mid  ICA                  82          42                      0 95  Prox  ICA    Normal       81          31                      0 93  Dist CCA                  94          29                            Mid CCA                   87          14                      0 77  Prox CCA                 112          21                            Ext Carotid              105          21                      1 21  Prox Vert                 84          19  Antegrade                 Subclavian               108           0                             Left         Impression  PSV  EDV (cm/s)  Direction of Flow  Ratio  Mid  ICA                  78          34                      0 61  Prox  ICA    Normal       83          33                      0 65  Dist CCA                 114          28                            Mid CCA                  128          33                      1 07  Prox CCA                 120          33                            Ext Carotid               79          19                      0 61  Prox Vert                 69          25  Antegrade                 Subclavian               137          13                               CONCLUSION: Impression RIGHT: There is no evidence of arterial disease throughout the extracranial carotid system  Vertebral artery flow is antegrade  There is no significant subclavian artery disease  LEFT: There is no evidence of arterial disease throughout the extracranial carotid system  Vertebral artery flow is antegrade  There is no significant subclavian artery disease    Internal carotid artery stenosis determination by consensus criteria from: Lou Christian et al  Carotid Artery Stenosis: Gray-Scale and Doppler US Diagnosis - Society of Radiologists in Ultrasound Consensus Conference, Radiology 2003; [id number]    SIGNATURE: Electronically Signed by: Bassam Gonzalez MD, RPVI on 2018-07-08 01:34:47 PM      Current Facility-Administered Medications   Medication Dose Route Frequency    amitriptyline (ELAVIL) tablet 25 mg  25 mg Oral HS    calcium carbonate (OYSTER SHELL,OSCAL) 500 mg tablet 1 tablet  1 tablet Oral Daily With Breakfast    calcium gluconate 1 g in sodium chloride 0 9 % 50 mL IVPB  1 g Intravenous Once    enoxaparin (LOVENOX) subcutaneous injection 40 mg  40 mg Subcutaneous Q24H    LORazepam (ATIVAN) 2 mg/mL injection 0 5 mg  0 5 mg Intravenous Once    methocarbamol (ROBAXIN) tablet 750 mg  750 mg Oral Once    ondansetron (ZOFRAN) injection 4 mg  4 mg Intravenous Q6H PRN    potassium chloride (K-DUR,KLOR-CON) CR tablet 40 mEq  40 mEq Oral TID With Meals    potassium chloride 20 mEq IVPB (premix)  20 mEq Intravenous Q2H    sodium chloride 0 45 % with KCl 20 mEq/L infusion (premix)  100 mL/hr Intravenous Continuous    topiramate (TOPAMAX) tablet 25 mg  25 mg Oral Q12H [address]     Medications Discontinued During This Encounter   Medication Reason    potassium chloride (K-DUR,KLOR-CON) CR tablet 40 mEq     acetaminophen (TYLENOL) tablet 650 mg     sodium chloride 0 9 % with KCl 40 mEq/L infusion (premix)     potassium chloride (K-DUR,KLOR-CON) CR tablet 40 mEq     potassium chloride (K-DUR,KLOR-CON) CR tablet 40 mEq        Inna Crockett MD

## 2018-07-09 NOTE — CASE MANAGEMENT
Notification of Inpatient Admission/Inpatient Authorization Request  This is a Notification of Inpatient Admission/Request for Inpatient Authorization to our facility [address]  Please be advised that this patient is currently in our facility under Inpatient Status  Below you will find the Attending Physician and Facilitys information including NPI# and contact information for the Utilization  assigned to the [facility] where the patient is receiving services  Please feel free to contact the Utilization Review Department with any questions  Patient Information:  PATIENT NAME: Marah Cole  MRN: [id number]  YOB: 1985    PRESENTATION DATE: 7/7/2018  3:41 PM  IP ADMISSION DATE: 7/8/18 0905  DISCHARGE DATE: No discharge date for patient encounter  DISPOSITION: Final discharge disposition not confirmed    Attending Physician:  Duana Hamman Hostetter, D O  Specialty- Hospitalist,   [facility] ID- [id number]  [address]   [address]  Phone [phone]: [phone]  Fax: [phone]    Facility:  [facility] [address] [place] [address] Road  [facility] [address], [address]  Phone: [phone]  Tax ID: [id number]  NPI: [id number]    [address] [place] [address] in the [facility] by Iftikhar Rogers for 2017  Network Utilization Review Department  Phone: [phone]; Fax [phone]  ATTENTION: The Network Utilization Review Department is now centralized for our 7 Facilities  Make a note that we have a new phone and fax numbers for our Department  Please call with any questions or concerns to [phone] and carefully follow the prompts so that you are directed to the right person  All voicemails are confidential  Fax any determinations, approvals, denials, and requests for initial or continue stay review clinical to [phone]   Due to HIGH CALL volume, it would be easier if you could please send faxed requests to expedite your requests and in part, help us provide discharge notifications faster

## 2018-07-09 NOTE — ASSESSMENT & PLAN NOTE
-improving  -hypokalemia is likely multifactorial secondary the patient changing her diet over the last few weeks, secondary to dehydration, and with a history of gastric bypass  -continue to replete with p o  and IV potassium chloride supplementation  -I appreciate Nephrology's input  -continue to monitor her potassium level closely  -telemetry can be discontinued

## 2018-07-09 NOTE — OCCUPATIONAL THERAPY NOTE
Occupational Therapy Evaluation     Patient Name: Jennie Mercado  [id number] Date: 7/9/2018  Problem List  Patient Active Problem List   Diagnosis    Non-traumatic rhabdomyolysis    Hypokalemia    History of gastric bypass    History of migraine headaches    Left-sided weakness    Transaminitis    Hypocalcemia    Hypophosphatemia    Hypernatremia     Past Medical History  History reviewed  No pertinent past medical history  Past Surgical History  Past Surgical History:   Procedure Laterality Date    ABDOMINAL SURGERY      APPENDECTOMY      CHOLECYSTECTOMY      GASTRIC BYPASS      HYSTERECTOMY             07/09/18 1131   Note Type   Note type Eval/Treat   Restrictions/Precautions   Weight Bearing Precautions Per Order No   Other Precautions Multiple lines;Telemetry; Fall Risk;Pain   Pain Assessment   Pain Assessment 0-10   Pain Score 6   Pain Location Hand   Pain Orientation Right;Left   Pain Descriptors Cramping;Heaviness   Home Living   Type of [address] [place] [address] One level;Performs ADLs on one level; Able to live on main level with bedroom/bathroom  (no CARLOS)   Bathroom Shower/Tub Tub/shower unit   Bathroom Toilet Standard   Bathroom Accessibility Accessible   Additional Comments pt with no DME at baseline    Prior Function   Level of [place] Independent with ADLs and functional mobility   Lives With Spouse   ADL Assistance Independent   IADLs Independent   Falls in the last 6 months 0   Comments pt is (I) with driving   Psychosocial   Psychosocial (WDL) WDL   ADL   Additional Comments pt reports difficulties with socks and LB dressing and nina hygiene at this time due to consistent muscle spasm in B hands    Bed Mobility   Supine to Sit 5  Supervision   Sit to Supine 5  Supervision   Additional Comments seated EOB with good sitting balance    Transfers   Sit to Stand 5  Supervision   Stand to Sit 5  Supervision   Stand pivot 5  Supervision   Functional Mobility   Functional Mobility 5 Supervision   Additional Comments pt performed ~200ft of FM with no device; pt with no LOB or instability    Balance   Static Sitting Good   Dynamic Sitting Good   Static Standing Fair +   Dynamic Standing Fair +   Ambulatory Fair   Activity Tolerance   Activity Tolerance Patient tolerated treatment well   RUE Assessment   RUE Assessment X  (3+/5 grossly; WFL AROM; digits in extension)   LUE Assessment   LUE Assessment X  (3+/5 grossly; WFL AROM; digits in extension)   Hand Function   Gross Motor Coordination Functional   Fine Motor Coordination Impaired  (bilateral hands )   Sensation   Light Touch No apparent deficits   Sharp/Dull No apparent deficits   Additional Comments reports numbness and tingling "comes and goes in B UEs"   Vision-Basic Assessment   Current Vision No visual deficits   Perception   Inattention/Neglect Appears intact   Cognition   Overall Cognitive Status WFL   Arousal/Participation Alert   Attention Within functional limits   Orientation Level Oriented X4   Memory Within functional limits   Following Commands Follows all commands and directions without difficulty   Assessment   Limitation Decreased ADL status; Decreased UE strength;Decreased endurance;Decreased self-care trans;Decreased high-level ADLs; Decreased fine motor control   Assessment pt presents at evaluation performing at overall (S) level with no device during FM; pt with chronic spasm in B hands; therapist was able to flexthe ring and 5th digit, remaining fingers remain flexed including thumb and not able to flex actively or passively; recommend continued OT intervention and spoke with pt in regards to outpatient OT on d/c    Goals   Short Term Goal  pt will tolerate A/AROM or passive stretching to the digits and wrist of bilateral hands    Long Term Goal #1 pt will perform nina hygiene at (S) level    Long Term Goal #2 pt will perform functional mobility at (I) level    Long Term Goal pt will perform fine motor tasks at bedside utilizing B hands/digits to complete    Plan   Treatment Interventions ADL retraining;Functional transfer training;UE strengthening/ROM; Endurance training;Patient/family training;Neuromuscular reeducation; Fine motor coordination activities; Activityengagement   Goal Expiration Date 07/23/18   OT Frequency 3-5x/wk   Recommendation   OT Discharge Recommendation Outpatient OT   OT - OK to Discharge No   Barthel Index   Feeding 5   Bathing 0   Grooming Score 0   Dressing Score 5   Bladder Score 10   Bowels Score 10   Toilet Use Score 5   Transfers (Bed/Chair) Score 10   Mobility (Level Surface) Score 10   Stairs Score 0   Barthel Index Score 55       Pt will benefit from continued OT services in order to maximize (I) c ADL performance, FM c no device, and improve overall endurance/strength required to complete functional tasks in preparation for d/c  Pt left supine in bed at end of session; all needs within reach; all lines intact

## 2018-07-10 ENCOUNTER — APPOINTMENT (EMERGENCY)
Dept: RADIOLOGY | Facility: HOSPITAL | Age: 33
DRG: 422 | End: 2018-07-10
Payer: COMMERCIAL

## 2018-07-10 ENCOUNTER — HOSPITAL ENCOUNTER (INPATIENT)
Facility: HOSPITAL | Age: 33
LOS: 1 days | DRG: 422 | End: 2018-07-12
Attending: EMERGENCY MEDICINE | Admitting: INTERNAL MEDICINE
Payer: COMMERCIAL

## 2018-07-10 ENCOUNTER — APPOINTMENT (EMERGENCY)
Dept: CT IMAGING | Facility: HOSPITAL | Age: 33
DRG: 422 | End: 2018-07-10
Payer: COMMERCIAL

## 2018-07-10 VITALS
TEMPERATURE: 98.2 F | BODY MASS INDEX: 27.34 KG/M2 | OXYGEN SATURATION: 100 % | SYSTOLIC BLOOD PRESSURE: 108 MMHG | WEIGHT: 154.32 LBS | RESPIRATION RATE: 18 BRPM | HEIGHT: 63 IN | DIASTOLIC BLOOD PRESSURE: 65 MMHG | HEART RATE: 91 BPM

## 2018-07-10 DIAGNOSIS — R29.898 LOWER EXTREMITY WEAKNESS: Primary | ICD-10-CM

## 2018-07-10 DIAGNOSIS — R29.898 WEAKNESS OF BOTH UPPER EXTREMITIES: ICD-10-CM
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
## 2018-07-10 DIAGNOSIS — R29.898 WEAKNESS OF BOTH LOWER EXTREMITIES: ICD-10-CM

## 2018-07-10 DIAGNOSIS — E83.51 HYPOCALCEMIA: ICD-10-CM

## 2018-07-10 DIAGNOSIS — S09.90XA CLOSED HEAD INJURY: ICD-10-CM
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
## 2018-07-10 PROBLEM — E55.9 VITAMIN D DEFICIENCY: Status: ACTIVE | Noted: 2018-07-10

## 2018-07-10 PROBLEM — D64.9 ANEMIA: Status: ACTIVE | Noted: 2018-07-10

## 2018-07-10 LAB
25(OH)D3 SERPL-MCNC: 10.6 NG/ML (ref 30–100)
ALBUMIN SERPL BCP-MCNC: 2.1 G/DL (ref 3.5–5)
ALBUMIN SERPL BCP-MCNC: 2.4 G/DL (ref 3.5–5)
ALP SERPL-CCNC: 34 U/L (ref 46–116)
ALP SERPL-CCNC: 37 U/L (ref 46–116)
ALT SERPL W P-5'-P-CCNC: 34 U/L (ref 12–78)
ALT SERPL W P-5'-P-CCNC: 40 U/L (ref 12–78)
ANION GAP SERPL CALCULATED.3IONS-SCNC: 7 MMOL/L (ref 4–13)
ANION GAP SERPL CALCULATED.3IONS-SCNC: 8 MMOL/L (ref 4–13)
AST SERPL W P-5'-P-CCNC: 29 U/L (ref 5–45)
AST SERPL W P-5'-P-CCNC: 42 U/L (ref 5–45)
BASOPHILS # BLD AUTO: 0.04 THOUSANDS/ΜL (ref 0–0.1)
BASOPHILS # BLD AUTO: 0.05 THOUSANDS/ΜL (ref 0–0.1)
BASOPHILS NFR BLD AUTO: 1 % (ref 0–1)
BASOPHILS NFR BLD AUTO: 1 % (ref 0–1)
BILIRUB DIRECT SERPL-MCNC: 0.05 MG/DL (ref 0–0.2)
BILIRUB SERPL-MCNC: 0.2 MG/DL (ref 0.2–1)
BILIRUB SERPL-MCNC: 0.2 MG/DL (ref 0.2–1)
BUN SERPL-MCNC: 8 MG/DL (ref 5–25)
BUN SERPL-MCNC: 9 MG/DL (ref 5–25)
CALCIUM SERPL-MCNC: 7.2 MG/DL (ref 8.3–10.1)
CALCIUM SERPL-MCNC: 7.8 MG/DL (ref 8.3–10.1)
CHLORIDE SERPL-SCNC: 109 MMOL/L (ref 100–108)
CHLORIDE SERPL-SCNC: 110 MMOL/L (ref 100–108)
CK MB SERPL-MCNC: 1.7 NG/ML (ref 0–5)
CK MB SERPL-MCNC: <1 % (ref 0–2.5)
CK SERPL-CCNC: 458 U/L (ref 26–192)
CO2 SERPL-SCNC: 24 MMOL/L (ref 21–32)
CO2 SERPL-SCNC: 25 MMOL/L (ref 21–32)
CREAT SERPL-MCNC: 0.69 MG/DL (ref 0.6–1.3)
CREAT SERPL-MCNC: 0.72 MG/DL (ref 0.6–1.3)
EOSINOPHIL # BLD AUTO: 0.26 THOUSAND/ΜL (ref 0–0.61)
EOSINOPHIL # BLD AUTO: 0.28 THOUSAND/ΜL (ref 0–0.61)
EOSINOPHIL NFR BLD AUTO: 4 % (ref 0–6)
EOSINOPHIL NFR BLD AUTO: 4 % (ref 0–6)
ERYTHROCYTE [DISTWIDTH] IN BLOOD BY AUTOMATED COUNT: 15.1 % (ref 11.6–15.1)
ERYTHROCYTE [DISTWIDTH] IN BLOOD BY AUTOMATED COUNT: 15.3 % (ref 11.6–15.1)
GFR SERPL CREATININE-BSD FRML MDRD: 110 ML/MIN/1.73SQ M
GFR SERPL CREATININE-BSD FRML MDRD: 114 ML/MIN/1.73SQ M
GLUCOSE SERPL-MCNC: 78 MG/DL (ref 65–140)
GLUCOSE SERPL-MCNC: 82 MG/DL (ref 65–140)
HCG SERPL QL: NEGATIVE
HCT VFR BLD AUTO: 30.6 % (ref 34.8–46.1)
HCT VFR BLD AUTO: 34.1 % (ref 34.8–46.1)
HGB BLD-MCNC: 10 G/DL (ref 11.5–15.4)
HGB BLD-MCNC: 11 G/DL (ref 11.5–15.4)
HOLD SPECIMEN: NORMAL
INR PPP: 0.95 (ref 0.86–1.17)
LYMPHOCYTES # BLD AUTO: 2.51 THOUSANDS/ΜL (ref 0.6–4.47)
LYMPHOCYTES # BLD AUTO: 2.8 THOUSANDS/ΜL (ref 0.6–4.47)
LYMPHOCYTES NFR BLD AUTO: 33 % (ref 14–44)
LYMPHOCYTES NFR BLD AUTO: 47 % (ref 14–44)
MAGNESIUM SERPL-MCNC: 1.9 MG/DL (ref 1.6–2.6)
MAGNESIUM SERPL-MCNC: 2 MG/DL (ref 1.6–2.6)
MCH RBC QN AUTO: 30.1 PG (ref 26.8–34.3)
MCH RBC QN AUTO: 30.5 PG (ref 26.8–34.3)
MCHC RBC AUTO-ENTMCNC: 32.3 G/DL (ref 31.4–37.4)
MCHC RBC AUTO-ENTMCNC: 32.7 G/DL (ref 31.4–37.4)
MCV RBC AUTO: 93 FL (ref 82–98)
MCV RBC AUTO: 93 FL (ref 82–98)
MONOCYTES # BLD AUTO: 0.38 THOUSAND/ΜL (ref 0.17–1.22)
MONOCYTES # BLD AUTO: 0.6 THOUSAND/ΜL (ref 0.17–1.22)
MONOCYTES NFR BLD AUTO: 6 % (ref 4–12)
MONOCYTES NFR BLD AUTO: 8 % (ref 4–12)
NEUTROPHILS # BLD AUTO: 2.46 THOUSANDS/ΜL (ref 1.85–7.62)
NEUTROPHILS # BLD AUTO: 4.22 THOUSANDS/ΜL (ref 1.85–7.62)
NEUTS SEG NFR BLD AUTO: 41 % (ref 43–75)
NEUTS SEG NFR BLD AUTO: 55 % (ref 43–75)
PHOSPHATE SERPL-MCNC: 2.8 MG/DL (ref 2.7–4.5)
PHOSPHATE SERPL-MCNC: 3.5 MG/DL (ref 2.7–4.5)
PLATELET # BLD AUTO: 241 THOUSANDS/UL (ref 149–390)
PLATELET # BLD AUTO: 262 THOUSANDS/UL (ref 149–390)
PMV BLD AUTO: 10.7 FL (ref 8.9–12.7)
PMV BLD AUTO: 11.1 FL (ref 8.9–12.7)
POTASSIUM SERPL-SCNC: 3.7 MMOL/L (ref 3.5–5.3)
POTASSIUM SERPL-SCNC: 4 MMOL/L (ref 3.5–5.3)
PROT SERPL-MCNC: 4.7 G/DL (ref 6.4–8.2)
PROT SERPL-MCNC: 5.7 G/DL (ref 6.4–8.2)
PROTHROMBIN TIME: 12.2 SECONDS (ref 11.8–14.2)
RBC # BLD AUTO: 3.28 MILLION/UL (ref 3.81–5.12)
RBC # BLD AUTO: 3.66 MILLION/UL (ref 3.81–5.12)
SODIUM SERPL-SCNC: 141 MMOL/L (ref 136–145)
SODIUM SERPL-SCNC: 142 MMOL/L (ref 136–145)
TSH SERPL DL<=0.05 MIU/L-ACNC: 0.78 UIU/ML (ref 0.36–3.74)
WBC # BLD AUTO: 5.95 THOUSAND/UL (ref 4.31–10.16)
WBC # BLD AUTO: 7.65 THOUSAND/UL (ref 4.31–10.16)

## 2018-07-10 PROCEDURE — 97110 THERAPEUTIC EXERCISES: CPT

## 2018-07-10 PROCEDURE — 80053 COMPREHEN METABOLIC PANEL: CPT | Performed by: INTERNAL MEDICINE

## 2018-07-10 PROCEDURE — 85610 PROTHROMBIN TIME: CPT | Performed by: EMERGENCY MEDICINE

## 2018-07-10 PROCEDURE — 85025 COMPLETE CBC W/AUTO DIFF WBC: CPT | Performed by: INTERNAL MEDICINE

## 2018-07-10 PROCEDURE — 70450 CT HEAD/BRAIN W/O DYE: CPT

## 2018-07-10 PROCEDURE — 93005 ELECTROCARDIOGRAM TRACING: CPT

## 2018-07-10 PROCEDURE — 83735 ASSAY OF MAGNESIUM: CPT | Performed by: INTERNAL MEDICINE

## 2018-07-10 PROCEDURE — 80048 BASIC METABOLIC PNL TOTAL CA: CPT | Performed by: EMERGENCY MEDICINE

## 2018-07-10 PROCEDURE — 80076 HEPATIC FUNCTION PANEL: CPT | Performed by: EMERGENCY MEDICINE

## 2018-07-10 PROCEDURE — 82306 VITAMIN D 25 HYDROXY: CPT | Performed by: INTERNAL MEDICINE

## 2018-07-10 PROCEDURE — 84703 CHORIONIC GONADOTROPIN ASSAY: CPT | Performed by: EMERGENCY MEDICINE

## 2018-07-10 PROCEDURE — 84100 ASSAY OF PHOSPHORUS: CPT | Performed by: EMERGENCY MEDICINE

## 2018-07-10 PROCEDURE — 84443 ASSAY THYROID STIM HORMONE: CPT | Performed by: EMERGENCY MEDICINE

## 2018-07-10 PROCEDURE — 99239 HOSP IP/OBS DSCHRG MGMT >30: CPT | Performed by: INTERNAL MEDICINE

## 2018-07-10 PROCEDURE — 84439 ASSAY OF FREE THYROXINE: CPT | Performed by: EMERGENCY MEDICINE

## 2018-07-10 PROCEDURE — 36415 COLL VENOUS BLD VENIPUNCTURE: CPT | Performed by: EMERGENCY MEDICINE

## 2018-07-10 PROCEDURE — 85025 COMPLETE CBC W/AUTO DIFF WBC: CPT | Performed by: EMERGENCY MEDICINE

## 2018-07-10 PROCEDURE — 82550 ASSAY OF CK (CPK): CPT | Performed by: INTERNAL MEDICINE

## 2018-07-10 PROCEDURE — 84100 ASSAY OF PHOSPHORUS: CPT | Performed by: INTERNAL MEDICINE

## 2018-07-10 PROCEDURE — 71045 X-RAY EXAM CHEST 1 VIEW: CPT

## 2018-07-10 PROCEDURE — 83735 ASSAY OF MAGNESIUM: CPT | Performed by: EMERGENCY MEDICINE

## 2018-07-10 PROCEDURE — 82553 CREATINE MB FRACTION: CPT | Performed by: INTERNAL MEDICINE

## 2018-07-10 PROCEDURE — 72125 CT NECK SPINE W/O DYE: CPT

## 2018-07-10 PROCEDURE — 97535 SELF CARE MNGMENT TRAINING: CPT

## 2018-07-10 RX ORDER — POTASSIUM CHLORIDE 20 MEQ/1
40 TABLET, EXTENDED RELEASE ORAL ONCE
Status: COMPLETED | OUTPATIENT
Start: 2018-07-10 | End: 2018-07-10

## 2018-07-10 RX ORDER — B-COMPLEX WITH VITAMIN C
1 TABLET ORAL 2 TIMES DAILY WITH MEALS
Qty: 60 TABLET | Refills: 0 | Status: SHIPPED | OUTPATIENT
Start: 2018-07-10 | End: 2018-10-14

## 2018-07-10 RX ORDER — METHOCARBAMOL 750 MG/1
750 TABLET, FILM COATED ORAL EVERY 6 HOURS PRN
Qty: 10 TABLET | Refills: 0 | Status: ON HOLD | OUTPATIENT
Start: 2018-07-10 | End: 2018-07-12 | Stop reason: ALTCHOICE

## 2018-07-10 RX ORDER — MELATONIN
1000 DAILY
Qty: 30 TABLET | Refills: 0 | Status: SHIPPED | OUTPATIENT
Start: 2018-07-11 | End: 2018-10-14

## 2018-07-10 RX ORDER — MECLIZINE HYDROCHLORIDE 25 MG/1
25 TABLET ORAL EVERY 12 HOURS PRN
Refills: 0
Start: 2018-07-10 | End: 2018-09-24 | Stop reason: HOSPADM

## 2018-07-10 RX ORDER — KETOROLAC TROMETHAMINE 30 MG/ML
15 INJECTION, SOLUTION INTRAMUSCULAR; INTRAVENOUS ONCE
Status: COMPLETED | OUTPATIENT
Start: 2018-07-10 | End: 2018-07-10

## 2018-07-10 RX ORDER — POTASSIUM CHLORIDE 20 MEQ/1
20 TABLET, EXTENDED RELEASE ORAL DAILY
Qty: 30 TABLET | Refills: 0 | Status: SHIPPED | OUTPATIENT
Start: 2018-07-11 | End: 2018-08-17 | Stop reason: HOSPADM

## 2018-07-10 RX ADMIN — CYANOCOBALAMIN TAB 500 MCG 500 MCG: 500 TAB at 08:52

## 2018-07-10 RX ADMIN — TOPIRAMATE 25 MG: 25 TABLET, FILM COATED ORAL at 08:52

## 2018-07-10 RX ADMIN — POTASSIUM CHLORIDE 40 MEQ: 1500 TABLET, EXTENDED RELEASE ORAL at 08:33

## 2018-07-10 RX ADMIN — KETOROLAC TROMETHAMINE 15 MG: 30 INJECTION, SOLUTION INTRAMUSCULAR at 02:09

## 2018-07-10 RX ADMIN — CALCIUM GLUCONATE 1 G: 94 INJECTION, SOLUTION INTRAVENOUS at 08:33

## 2018-07-10 NOTE — ASSESSMENT & PLAN NOTE
-continue topamax and amitriptyline  -topamax can contribute to hypokalemia  -she may need to be taken off topamax as an outpatient

## 2018-07-10 NOTE — DISCHARGE SUMMARY
Discharge- Aracelis Correa 1985, 28 y o  female MRN: [id number]    Unit/Bed#: 414-01 Encounter: [id number]    Primary Care Provider: Boby Gregory MD   Date and time admitted to hospital: 7/7/2018  3:41 PM        * Hypokalemia   Assessment & Plan    -resolved by the time of discharge  -hypokalemia is likely multifactorial secondary the patient changing her diet over the last few weeks, secondary to dehydration, and with a history of gastric bypass  -the patient was treated with p o  and IV potassium chloride supplementation during the hospitalization  -recheck a potassium level in 3 days as an outpatient  -she will be continued on potassium chloride 20 meQ p o  daily upon discharge  -follow-up with Nephrology and with her PCP as an outpatient in regards to this matter          Left-sided weakness   Assessment & Plan    -likely secondary to hypokalemia  -an MRI of the brain was completed today, 07/09/2018, with the following results/impression:  IMPRESSION:     Normal MRI of the brain  No acute intracranial pathology  Non-traumatic rhabdomyolysis   Assessment & Plan    Results for Cami Holcomb (MRN [id number]) as of 7/10/2018 15:38   Ref  Range 7/7/2018 16:23 7/7/2018 17:41 7/7/2018 22:27 7/8/2018 04:30 7/8/2018 04:31 7/8/2018 04:32 7/8/2018 16:19 7/9/2018 04:43 7/9/2018 12:46 7/9/2018 12:51 7/10/2018 05:42   Total CK Latest Ref Range: 26 - 192 U/L 968 (H)   611 (H)    546 (H)   458 (H)     -improved with IV fluids  -recheck a total CK (creatinine kinase) level in 3 days as an outpatient  -outpatient follow-up with Nephrology in regards to this matter  -monitor the patient's renal function and liver function tests as an outpatient        Vitamin D deficiency   Assessment & Plan    Results for Cami Holcomb (MRN [id number]) as of 7/10/2018 15:38   Ref   Range 7/10/2018 05:42   Vit D, 25-Hydroxy Latest Ref Range: 30 0 - 100 0 ng/mL 10 6 (L)     -cholecalciferol supplementation  -follow-up with Nephrology and with her PCP as an outpatient in regards to this matter          Anemia   Assessment & Plan    Results for Georgina Chakraborty (MRN [id number]) as of 7/10/2018 15:38   Ref  Range 7/8/2018 16:19   Vitamin B-12 Latest Ref Range: 100 - 900 pg/mL 130     Results for Georgina Chakraborty (MRN [id number]) as of 7/10/2018 15:38   Ref  Range 7/8/2018 16:19   Folate Latest Ref Range: 3 1 - 17 5 ng/mL 11 0     -follow her CBC as an outpatient with her PCP  -she was initiated on cyanocobalamin 500 micrograms p o  daily for the low-normal vitamin B12 level  -outpatient follow-up with her PCP in regards to this matter        Hypernatremia   Assessment & Plan    -resolved with IV fluids  -recheck the sodium level with blood work in 3 days as an outpatient        Hypophosphatemia   Assessment & Plan    -resolved with p o  phosphorus supplementation during the hospitalization  -check a phosphorus level in 3 days as an outpatient  -follow-up with Nephrology and with her PCP as an outpatient in regards to this matter        Hypocalcemia   Assessment & Plan    -the patient was treated calcium gluconate 1 gram IV x 2 doses during the hosptialization  -p o  calcium supplementation was initiated during the hospitalization and will be continued upon discharge  -check blood work in 3 days as an outpatient to monitor the calcium level  -outpatient follow-up with Nephrology        Transaminitis   Assessment & Plan    -likely secondary to rhabdomyolysis  -resolved by the time of discharge  -recheck the liver function test in 3 days as an outpatient  -an acute hepatitis panel was completed with the following results:  Results for Georgina Chakraborty (MRN [id number]) as of 7/9/2018 12:56   Ref   Range 7/8/2018 04:30   HEPATITIS A IGM ANTIBODY Latest Ref Range: Non-reactive, Equivocal-Suggest Recollect  Non-reactive   HEPATITIS B SURFACE ANTIGEN Latest Ref Range: Non-reactive, NonReactive - Confirmed  Non-reactive   HEPATITIS B CORE IGM ANTIBODY Latest Ref Range: Non-reactive  Non-reactive   HEPATITIS C ANTIBODY Latest Ref Range: Non-reactive  Non-reactive           History of migraine headaches   Assessment & Plan    -continue topamax and amitriptyline  -topamax can contribute to hypokalemia  -she may need to be taken off topamax as an outpatient        History of gastric bypass   Assessment & Plan    -likely contributing to the hypokalemia  -outpatient follow-up with her gastric bypass surgeon              Discharging Physician / Practitioner: Jagdeep Abel DO  PCP: Rj Parry MD  Admission Date: 07/07/18   Discharge Date: 07/10/18      Consultations During Hospital Stay:  · Nephrology    Procedures Performed:     · None       Outpatient Tests Requested:  · CBC with diff, CMP, Mag, Phos, Ionized Calcium, and total CK (creatinine kinase) level in 3 days  · Vitamin D 25-OH level in 1 month    Complications:  None    Reason for Admission:  Severe hypokalemia    Hospital Course: Mark Jaramillo is a 28 y o  female patient who originally presented to the hospital on 7/7/2018 due to bilateral hand weakness and left lower extremity weakness  Please see above list of diagnoses and related plan for additional information  Condition at Discharge: stable     Discharge Day Visit / Exam:     Subjective: The patient was seen and examined  Her bilateral hand spasms have resolved  She complains of mild, generalized weakness and lethargy    Vitals: Blood Pressure: 108/65 (07/10/18 0757)  Pulse: 91 (07/10/18 0757)  Temperature: 98 2 °F (36 8 °C) (07/10/18 0757)  Temp Source: Temporal (07/10/18 0757)  Respirations: 18 (07/10/18 0757)  Height: 5' 3" (160 cm) (07/07/18 8629)  Weight - Scale: 70 kg (154 lb 5 2 oz) (07/10/18 0651)  SpO2: 100 % (07/10/18 0757)  Exam:   Physical Exam  General:  NAD, awake, alert, oriented x 3  HEENT:  NC/AT, mucous membranes moist  Neck:  Supple, No JVP elevation  CV:  + S1, + S2, RRR  Pulm:  Lung fields are CTA bilaterally  Abd:  Soft, Non-tender, Non-distended  Ext:  No clubbing/cyanosis/edema  Skin:  No rashes      Discharge instructions/Information to patient and family:   See after visit summary for information provided to patient and family  Provisions for Follow-Up Care:  See after visit summary for information related to follow-up care and any pertinent home health orders  Disposition:     Home      Planned Readmission:  No     Discharge Statement:  I spent 45 minutes discharging the patient  This time was spent on the day of discharge  I had direct contact with the patient on the day of discharge  Greater than 50% of the total time was spent examining patient, answering all patient questions, arranging and discussing plan of care with patient as well as directly providing post-discharge instructions  Additional time then spent on discharge activities  Discharge Medications:  See after visit summary for reconciled discharge medications provided to patient and family        ** Please Note: This note has been constructed using a voice recognition system **

## 2018-07-10 NOTE — ASSESSMENT & PLAN NOTE
-the patient was treated calcium gluconate 1 gram IV x 2 doses during the hosptialization  -p o  calcium supplementation was initiated during the hospitalization and will be continued upon discharge  -check blood work in 3 days as an outpatient to monitor the calcium level  -outpatient follow-up with Nephrology

## 2018-07-10 NOTE — ASSESSMENT & PLAN NOTE
Results for Pat Angelucci (MRN [id number]) as of 7/10/2018 15:38   Ref   Range 7/7/2018 16:23 7/7/2018 17:41 7/7/2018 22:27 7/8/2018 04:30 7/8/2018 04:31 7/8/2018 04:32 7/8/2018 16:19 7/9/2018 04:43 7/9/2018 12:46 7/9/2018 12:51 7/10/2018 05:42   Total CK Latest Ref Range: 26 - 192 U/L 968 (H)   611 (H)    546 (H)   458 (H)     -improved with IV fluids  -recheck a total CK (creatinine kinase) level in 3 days as an outpatient  -outpatient follow-up with Nephrology in regards to this matter  -monitor the patient's renal function and liver function tests as an outpatient

## 2018-07-10 NOTE — ASSESSMENT & PLAN NOTE
Results for Zohreh Veliz (MRN [id number]) as of 7/10/2018 15:38   Ref   Range 7/10/2018 05:42   Vit D, 25-Hydroxy Latest Ref Range: 30 0 - 100 0 ng/mL 10 6 (L)     -cholecalciferol supplementation  -follow-up with Nephrology and with her PCP as an outpatient in regards to this matter

## 2018-07-10 NOTE — OCCUPATIONAL THERAPY NOTE
OT Note     07/10/18 0855   Restrictions/Precautions   Other Precautions Multiple lines; Fall Risk   ADL   Where Assessed Edge of bed   Grooming Assistance 5  Supervision/Setup   Grooming Comments Opens closes all containers   UB Bathing Assistance 5  Supervision/Setup   UB Bathing Comments A with back   LB Bathing Assistance (Pt  declines same)   UB Dressing Assistance 5  Supervision/Setup   UB Dressing Comments A with fasteners   LB Dressing Assistance 5  Supervision/Setup   LB Dressing Comments Doffs/dons non skids without difficulty   Bed Mobility   Supine to Sit 6  Modified independent   Therapeutic Exercise - ROM   UE-ROM Yes   ROM- Right Upper Extremities   R Shoulder AROM   R Wrist Wrist flexion;Wrist extension   R Hand AROM  (Flex/Ext and tip to tip opposition)   R Weight/Reps/Sets Completes all movements as able 2 sets/10   ROM - Left Upper Extremities    L Wrist AROM; Wrist flexion;Wrist extension   L Hand (Finger flex/ext, tip to tip opposition)   L Weight/Reps/Sets All movements completed as able 2 sets/10   Activity Tolerance   Activity Tolerance Patient tolerated treatment well   Assessment   Assessment Presents supine, completes AROM wrist/fingers as above as able  C/O hands are "tired "  Txfred EOB, completes UB a m  self care as per above  Manages all containers and wascloth without difficulty  Remains seated EOB on completion  Call bell and phone in reach     Recommendation   OT Discharge Recommendation Home with family support

## 2018-07-10 NOTE — ASSESSMENT & PLAN NOTE
Results for Angeles Al (MRN [id number]) as of 7/10/2018 15:38   Ref  Range 7/8/2018 16:19   Vitamin B-12 Latest Ref Range: 100 - 900 pg/mL 130     Results for Angeles Al (MRN [id number]) as of 7/10/2018 15:38   Ref   Range 7/8/2018 16:19   Folate Latest Ref Range: 3 1 - 17 5 ng/mL 11 0     -follow her CBC as an outpatient with her PCP  -she was initiated on cyanocobalamin 500 micrograms p o  daily for the low-normal vitamin B12 level  -outpatient follow-up with her PCP in regards to this matter

## 2018-07-10 NOTE — ASSESSMENT & PLAN NOTE
-likely secondary to rhabdomyolysis  -resolved by the time of discharge  -recheck the liver function test in 3 days as an outpatient  -an acute hepatitis panel was completed with the following results:  Results for Georgina Chakraborty (MRN [id number]) as of 7/9/2018 12:56   Ref   Range 7/8/2018 04:30   HEPATITIS A IGM ANTIBODY Latest Ref Range: Non-reactive, Equivocal-Suggest Recollect  Non-reactive   HEPATITIS B SURFACE ANTIGEN Latest Ref Range: Non-reactive, NonReactive - Confirmed  Non-reactive   HEPATITIS B CORE IGM ANTIBODY Latest Ref Range: Non-reactive  Non-reactive   HEPATITIS C ANTIBODY Latest Ref Range: Non-reactive  Non-reactive

## 2018-07-10 NOTE — SOCIAL WORK
Cm spoke with the patient and they are for d/c to home today  Cm is taking into account that the patient has preferences while planning the d/c needs  Cm plan was discussed with the patient and the patient is agreeable to the plan the patient does understand the importance of follow up care and taking the medications as prescribed  The patient is aware of any symptoms that she should look for when d/c  The patient will  Make an appointment with nephrology   The patient wants to make his own appointments    She is agreeable to the d/c plan

## 2018-07-10 NOTE — ASSESSMENT & PLAN NOTE
-resolved with p o  phosphorus supplementation during the hospitalization  -check a phosphorus level in 3 days as an outpatient  -follow-up with Nephrology and with her PCP as an outpatient in regards to this matter

## 2018-07-10 NOTE — CASE MANAGEMENT
Notification of Discharge  This is a Notification of Discharge from our facility [address]  Please be advised that this patient has been discharge from our facility  Below you will find the admission and discharge date and time including the patients disposition  PRESENTATION DATE: 7/7/2018  3:41 PM  IP ADMISSION DATE: 7/8/18 0905  DISCHARGE DATE: 7/10/2018 10:50 AM  DISPOSITION: Home/Self Care    [address] [facility] in the [facility] Utilization Review Department  Phone: [phone]; Fax [phone]  ATTENTION: The Network Utilization Review Department is now centralized for our 9 Facilities  Make a note that we have a new phone and fax numbers for our Department  Please call with any questions or concerns to [phone] and carefully follow the prompts so that you are directed to the right person  All voicemails are confidential  Fax any determinations, approvals, denials, and requests for initial or continue stay review clinical to [phone]  Due to HIGH CALL volume, it would be easier if you could please send faxed requests to expedite your requests and in part, help us provide discharge notifications faster

## 2018-07-10 NOTE — PROGRESS NOTES
Progress Note - Nephrology   Rikki Conteh 28 y o  female MRN: [id number]  Unit/Bed#: 414-01 Encounter: [id number]    A/P:  1  Nontraumatic rhabdomyolysis: I have reviewed her recent history and there was no trauma  She walks and does mild lifting in her line of work but there was nothing unusual and she was not outside in the hot sun  She says she was well hydrated  She is feeling better  2  Hypokalemia: Improving with IV and oral restoration  She has not taken any diuretics or laxatives  She denies any GI etiology  She is taking topiramate which can cause hypokalemia and metabolic acidosis, however, she has been taking it for a year with no change in the dose  Hypokalemia will be evaluated with urinary electrolytes  She had low magnesium levels on admission which were repleted  7/10: Electrolytes repleted  She is feeling better  Discussed diet with her and she is restarting complex carbohydrates  3  History of gastric bypass: no history of malabsorption, however, might consider this  4  Muscle weakness due to electrolyte abnormalities  5  Hypocalcemia: corrects to 9 22       Follow up reason for today's visit: Hypokalemia    Hypokalemia    Patient Active Problem List   Diagnosis    Non-traumatic rhabdomyolysis    Hypokalemia    History of gastric bypass    History of migraine headaches    Left-sided weakness    Transaminitis    Hypocalcemia    Hypophosphatemia    Hypernatremia    Anemia         Subjective:   She denies headache dizziness chest pain shortness of breath  She denies abdominal pain nausea vomiting or diarrhea  She denies dysuria  She states she did no heavy lifting or excessive activity 2 days prior to admission  She was in air conditioning and did not change her diet  She is not taking any supplements in 2 years  She denies any diuretic therapy  She drank enough water and had no diarrhea or change in urinary pattern  She does take topiramate 100 mg daily    But she has been doing this for a year for treatment for migraine after a concussion  She says she feels better today  She is receiving IV fluids with IV potassium repletion  She states her diet is protein with fresh fruits and vegetables  She tries to avoid carbohydrates  She had 172 lb after gastric bypass done 4 years ago  Objective:     Vitals: Blood pressure 108/65, pulse 91, temperature 98 2 °F (36 8 °C), temperature source Temporal, resp  rate 18, height 5' 3" (1 6 m), weight 70 kg (154 lb 5 2 oz), SpO2 100 %  ,Body mass index is 27 34 kg/m²  Weight (last 2 days)     Date/Time   Weight    07/10/18 0651  70 (154 32)    07/09/18 0600  68 (149 91)    07/08/18 0600  68 2 (150 35)                Intake/Output Summary (Last 24 hours) at 07/10/18 0855  Last data filed at 07/09/18 2326   Gross per 24 hour   Intake             2270 ml   Output              400 ml   Net             1870 ml     I/O last 3 completed shifts: In: 36 [P O :1520;  I V :2960]  Out: 400 [Urine:400]         Physical Exam: /65 (BP Location: Right arm)   Pulse 91   Temp 98 2 °F (36 8 °C) (Temporal)   Resp 18   Ht 5' 3" (1 6 m)   Wt 70 kg (154 lb 5 2 oz)   SpO2 100%   BMI 27 34 kg/m²     General Appearance:    Alert, cooperative, no distress, appears stated age   Head:    Normocephalic, without obvious abnormality, atraumatic   Eyes:    Conjunctiva/corneas clear   Ears:    Normal external ears   Nose:   Nares normal, septum midline, mucosa normal, no drainage    or sinus tenderness   Throat:   Lips, mucosa, and tongue normal; teeth and gums normal   Neck:   Supple, symmetrical, trachea midline, no adenopathy;        thyroid:  No enlargement/tenderness/nodules; no carotid    bruit or JVD   Back:     Symmetric, no curvature, ROM normal, no CVA tenderness   Lungs:     Clear to auscultation bilaterally, respirations unlabored   Chest wall:    No tenderness or deformity   Heart:    Regular rate and rhythm, S1 and S2 normal, no murmur, rub or gallop   Abdomen:     Soft, non-tender, bowel sounds active   Extremities:   Extremities normal, atraumatic, no cyanosis or edema   Skin:   Skin color, texture, turgor normal, no rashes or lesions   Lymph nodes:   Cervical normal   Neurologic:   CNII-XII intact            Lab, Imaging and other studies: I have personally reviewed pertinent labs  CBC:   Lab Results   Component Value Date    WBC 5 95 07/10/2018    HGB 10 0 (L) 07/10/2018    HCT 30 6 (L) 07/10/2018    MCV 93 07/10/2018     07/10/2018    MCH 30 5 07/10/2018    MCHC 32 7 07/10/2018    RDW 15 3 (H) 07/10/2018    MPV 11 1 07/10/2018     CMP:   Lab Results   Component Value Date     07/10/2018    K 3 7 07/10/2018     (H) 07/10/2018    CO2 24 07/10/2018    ANIONGAP 8 07/10/2018    BUN 8 07/10/2018    CREATININE 0 72 07/10/2018    GLUCOSE 78 07/10/2018    CALCIUM 7 2 (L) 07/10/2018    AST 29 07/10/2018    ALT 34 07/10/2018    ALKPHOS 34 (L) 07/10/2018    PROT 4 7 (L) 07/10/2018    BILITOT 0 20 07/10/2018    EGFR 110 07/10/2018         Results from last 7 days  Lab Units 07/10/18  0542 07/09/18  1251 07/09/18  0443  07/08/18  0430   SODIUM mmol/L 142 144 146*  < > 144   POTASSIUM mmol/L 3 7 3 9 3 0*  < > 2 3*   CHLORIDE mmol/L 110* 112* 113*  < > 110*   CO2 mmol/L 24 24 28  < > 29   BUN mg/dL 8 7 8  < > 12   CREATININE mg/dL 0 72 0 82 0 77  < > 0 82   CALCIUM mg/dL 7 2* 7 5* 7 4*  < > 7 0*   TOTAL PROTEIN g/dL 4 7*  --  4 7*  --  5 0*   BILIRUBIN TOTAL mg/dL 0 20  --  0 10*  --  0 20   ALK PHOS U/L 34*  --  32*  --  39*   ALT U/L 34  --  35  --  38   AST U/L 29  --  34  --  45   GLUCOSE RANDOM mg/dL 78 124 78  < > 82   < > = values in this interval not displayed        Phosphorus:   Lab Results   Component Value Date    PHOS 3 5 07/10/2018     Magnesium:   Lab Results   Component Value Date    MG 1 9 07/10/2018     Urinalysis: No results found for: Con Laura, SPECGRAV, PHUR, LEUKOCYTESUR, NITRITE, PROTEINUA, GLUCOSEU, KETONESU, Trever Pulido  Ionized Calcium: No results found for: CAION  Coagulation: No results found for: PT, INR, APTT  Troponin: No results found for: TROPONINI  ABG: No results found for: PHART, HLM3YTE, PO2ART, IHU4JQP, W0ODUIHO, BEART, SOURCE  Radiology review:     IMAGING  Procedure: Ct Head Without Contrast    Result Date: 7/7/2018  Narrative: CT BRAIN - WITHOUT CONTRAST INDICATION:   left hand weakness- fine and gross motor 3 weeks, left leg weakness gross motor  lightheaded    COMPARISON:  None  TECHNIQUE:  CT examination of the brain was performed  In addition to axial images, coronal 2D reformatted images were created and submitted for interpretation  Radiation dose length product (DLP) for this visit:  968 mGy-cm   This examination, like all CT scans performed in the [facility], was performed utilizing techniques to minimize radiation dose exposure, including the use of iterative reconstruction and automated exposure control  IMAGE QUALITY:  Diagnostic  FINDINGS: PARENCHYMA:  No intracranial mass, mass effect or midline shift  No CT signs of acute infarction  No acute parenchymal hemorrhage  VENTRICLES AND EXTRA-AXIAL SPACES:  Normal for the patient's age  VISUALIZED ORBITS AND PARANASAL SINUSES:  Unremarkable  CALVARIUM AND EXTRACRANIAL SOFT TISSUES:  Normal      Impression: No acute intracranial abnormality  Workstation performed: [id number]     Procedure: Mra And Or Mrv Head Wo Contrast    Result Date: 7/9/2018  Narrative: MRA BRAIN INDICATION:  LEFT SIDED WEAKNESS   TIA/CVA  COMPARISON:  None  TECHNIQUE:  Axial 3-D time-of-flight imaging with 3-D reconstructions  FINDINGS: IMAGE QUALITY:  Diagnostic  ANATOMY INTERNAL CAROTID ARTERIES:  Normal flow related enhancement of the distal cervical, petrous and cavernous segments of the internal carotid arteries  Normal ICA terminus  ANTERIOR CIRCULATION:  Normal A1 segments  Normal anterior communicating artery    Normal flow-related enhancement of the anterior cerebral arteries  MIDDLE CEREBRAL ARTERY CIRCULATION:  The M1 segment and middle cerebral artery branches demonstrate normal flow-related enhancement  DISTAL VERTEBRAL ARTERIES:  Distal left vertebral artery is dominant  The distal right vertebral artery is slightly hypoplastic but consistent in caliber without stenosis  BASILAR ARTERY:  Normal  POSTERIOR CEREBRAL ARTERIES:  Both posterior cerebral arteries arises from the basilar tip  Both arteries demonstrate normal flow-related enhancement  Normal posterior communicating arteries  Impression: Normal MR angiogram of the brain  Workstation performed: [id number]     Procedure: Mri Brain W Wo Contrast    Result Date: 7/9/2018  Narrative: MRI BRAIN WITH AND WITHOUT CONTRAST INDICATION: LEFT SIDED WEAKNESS  COMPARISON:  None  TECHNIQUE: Sagittal T1, axial T2, axial FLAIR, axial T1, axial Gradient, axial diffusion  Sagittal, axial and coronal T1 postcontrast   Axial BRAVO post contrast   IV Contrast:  6 cc of Gadavist injected intravenously without immediate consequence  IMAGE QUALITY:   Diagnostic  FINDINGS: BRAIN PARENCHYMA:  There is no discrete mass, mass effect or midline shift  No abnormal white matter signal identified  Brainstem and cerebellum demonstrate normal signal  There is no intracranial hemorrhage  There is no evidence of acute infarction and  diffusion imaging is unremarkable  Postcontrast imaging of the brain demonstrates no abnormal enhancement  VENTRICLES:  Normal  SELLA AND PITUITARY GLAND:  Normal  ORBITS:  Normal  PARANASAL SINUSES:  Normal  VASCULATURE:  Evaluation of the major intracranial vasculature demonstrates appropriate flow voids  CALVARIUM AND SKULL BASE:  Normal  EXTRACRANIAL SOFT TISSUES:  Normal      Impression: Normal MRI of the brain  No acute intracranial pathology   Workstation performed: [id number]     Procedure: Vas Carotid Complete Study    Result Date: 7/8/2018  Narrative:  [facility] REPORT CLINICAL: Indications:  Left sided weakness and tingling  Risk Factors The patient has no history of HTN, Diabetes or hyperlipidemia  Clinical Right Pressure:  104/52 mm Hg, Left Pressure:  not obtained due to IV  FINDINGS:  Right        Impression  PSV  EDV (cm/s)  Direction of Flow  Ratio  Dist  ICA                 87          40                      1 00  Mid  ICA                  82          42                      0 95  Prox  ICA    Normal       81          31                      0 93  Dist CCA                  94          29                            Mid CCA                   87          14                      0 77  Prox CCA                 112          21                            Ext Carotid              105          21                      1 21  Prox Vert                 84          19  Antegrade                 Subclavian               108           0                             Left         Impression  PSV  EDV (cm/s)  Direction of Flow  Ratio  Mid  ICA                  78          34                      0 61  Prox  ICA    Normal       83          33                      0 65  Dist CCA                 114          28                            Mid CCA                  128          33                      1 07  Prox CCA                 120          33                            Ext Carotid               79          19                      0 61  Prox Vert                 69          25  Antegrade                 Subclavian               137          13                               CONCLUSION: Impression RIGHT: There is no evidence of arterial disease throughout the extracranial carotid system  Vertebral artery flow is antegrade  There is no significant subclavian artery disease  LEFT: There is no evidence of arterial disease throughout the extracranial carotid system  Vertebral artery flow is antegrade  There is no significant subclavian artery disease    Internal carotid artery stenosis determination by consensus criteria from: alice Silverio al  Carotid Artery Stenosis: Gray-Scale and Doppler US Diagnosis - [employer] in [address] [place] [address] Drive, Radiology 2003; [phone]    SIGNATURE: Electronically Signed by: Jono Granger MD, RPVI on 2018-07-08 01:34:47 PM      Current Facility-Administered Medications   Medication Dose Route Frequency    amitriptyline (ELAVIL) tablet 25 mg  25 mg Oral HS    calcium gluconate 1 g in sodium chloride 0 9 % 50 mL IVPB  1 g Intravenous Once    cyanocobalamin (VITAMIN B-12) tablet 500 mcg  500 mcg Oral Daily    enoxaparin (LOVENOX) subcutaneous injection 40 mg  40 mg Subcutaneous Q24H    methocarbamol (ROBAXIN) tablet 750 mg  750 mg Oral Q6H PRN    ondansetron (ZOFRAN) injection 4 mg  4 mg Intravenous Q6H PRN    sodium chloride 0 45 % with KCl 20 mEq/L infusion (premix)  100 mL/hr Intravenous Continuous    topiramate (TOPAMAX) tablet 25 mg  25 mg Oral Q12H [facility]     Medications Discontinued During This Encounter   Medication Reason    potassium chloride (K-DUR,KLOR-CON) CR tablet 40 mEq     acetaminophen (TYLENOL) tablet 650 mg     sodium chloride 0 9 % with KCl 40 mEq/L infusion (premix)     potassium chloride (K-DUR,KLOR-CON) CR tablet 40 mEq     potassium chloride (K-DUR,KLOR-CON) CR tablet 40 mEq     calcium carbonate (OYSTER SHELL,OSCAL) 500 mg tablet 1 tablet     meclizine (ANTIVERT) 25 mg tablet        Nirmal Campa MD

## 2018-07-10 NOTE — PROGRESS NOTES
Patient c/o pain in b/l hands  Hands are still contracted with first digit and thumb bilaterally  SALEEM Moore made aware  Toradol 15 mg ordered for pain and given at 2001  Upon reassessment of patient, she stated she was able to now move the first digit and thumb of both hands and her hands feel better   are still weak  Will continue to monitor

## 2018-07-10 NOTE — ASSESSMENT & PLAN NOTE
-resolved by the time of discharge  -hypokalemia is likely multifactorial secondary the patient changing her diet over the last few weeks, secondary to dehydration, and with a history of gastric bypass  -the patient was treated with p o  and IV potassium chloride supplementation during the hospitalization  -recheck a potassium level in 3 days as an outpatient  -she will be continued on potassium chloride 20 meQ p o  daily upon discharge  -follow-up with Nephrology and with her PCP as an outpatient in regards to this matter

## 2018-07-11 ENCOUNTER — APPOINTMENT (OUTPATIENT)
Dept: MRI IMAGING | Facility: HOSPITAL | Age: 33
DRG: 422 | End: 2018-07-11
Payer: COMMERCIAL

## 2018-07-11 ENCOUNTER — APPOINTMENT (INPATIENT)
Dept: ULTRASOUND IMAGING | Facility: HOSPITAL | Age: 33
DRG: 422 | End: 2018-07-11
Payer: COMMERCIAL

## 2018-07-11 PROBLEM — M62.82 NON-TRAUMATIC RHABDOMYOLYSIS: Status: RESOLVED | Noted: 2018-07-07 | Resolved: 2018-07-11

## 2018-07-11 PROBLEM — R59.0 CERVICAL LYMPHADENOPATHY: Status: ACTIVE | Noted: 2018-07-11

## 2018-07-11 PROBLEM — R29.898 WEAKNESS OF BOTH LOWER EXTREMITIES: Status: ACTIVE | Noted: 2018-07-11

## 2018-07-11 PROBLEM — E87.0 HYPERNATREMIA: Status: RESOLVED | Noted: 2018-07-09 | Resolved: 2018-07-11

## 2018-07-11 PROBLEM — R74.8 ELEVATED CPK: Status: ACTIVE | Noted: 2018-07-11

## 2018-07-11 PROBLEM — E83.39 HYPOPHOSPHATEMIA: Status: RESOLVED | Noted: 2018-07-09 | Resolved: 2018-07-11

## 2018-07-11 PROBLEM — E83.51 HYPOCALCEMIA: Status: RESOLVED | Noted: 2018-07-09 | Resolved: 2018-07-11

## 2018-07-11 PROBLEM — R74.01 TRANSAMINITIS: Status: RESOLVED | Noted: 2018-07-07 | Resolved: 2018-07-11

## 2018-07-11 PROBLEM — E53.8 VITAMIN B12 DEFICIENCY: Status: ACTIVE | Noted: 2018-07-11

## 2018-07-11 PROBLEM — R29.898 WEAKNESS OF BOTH UPPER EXTREMITIES: Status: ACTIVE | Noted: 2018-07-11

## 2018-07-11 LAB
ALBUMIN SERPL BCP-MCNC: 2.2 G/DL (ref 3.5–5)
ALP SERPL-CCNC: 34 U/L (ref 46–116)
ALT SERPL W P-5'-P-CCNC: 35 U/L (ref 12–78)
ANION GAP SERPL CALCULATED.3IONS-SCNC: 8 MMOL/L (ref 4–13)
AST SERPL W P-5'-P-CCNC: 32 U/L (ref 5–45)
ATRIAL RATE: 70 BPM
BASOPHILS # BLD AUTO: 0.03 THOUSANDS/ΜL (ref 0–0.1)
BASOPHILS NFR BLD AUTO: 0 % (ref 0–1)
BILIRUB SERPL-MCNC: 0.2 MG/DL (ref 0.2–1)
BILIRUB UR QL STRIP: NEGATIVE
BUN SERPL-MCNC: 8 MG/DL (ref 5–25)
CALCIUM SERPL-MCNC: 7.6 MG/DL (ref 8.3–10.1)
CHLORIDE SERPL-SCNC: 110 MMOL/L (ref 100–108)
CK MB SERPL-MCNC: 1.8 NG/ML (ref 0–5)
CK MB SERPL-MCNC: <1 % (ref 0–2.5)
CK SERPL-CCNC: 415 U/L (ref 26–192)
CLARITY UR: NORMAL
CO2 SERPL-SCNC: 24 MMOL/L (ref 21–32)
COLOR UR: YELLOW
CREAT SERPL-MCNC: 0.74 MG/DL (ref 0.6–1.3)
CRP SERPL QL: <3 MG/L
EOSINOPHIL # BLD AUTO: 0.24 THOUSAND/ΜL (ref 0–0.61)
EOSINOPHIL NFR BLD AUTO: 4 % (ref 0–6)
ERYTHROCYTE [DISTWIDTH] IN BLOOD BY AUTOMATED COUNT: 15 % (ref 11.6–15.1)
ERYTHROCYTE [SEDIMENTATION RATE] IN BLOOD: 20 MM/HOUR (ref 0–20)
GFR SERPL CREATININE-BSD FRML MDRD: 106 ML/MIN/1.73SQ M
GLUCOSE SERPL-MCNC: 95 MG/DL (ref 65–140)
GLUCOSE UR STRIP-MCNC: NEGATIVE MG/DL
HCT VFR BLD AUTO: 30.3 % (ref 34.8–46.1)
HGB BLD-MCNC: 9.9 G/DL (ref 11.5–15.4)
HGB UR QL STRIP.AUTO: NEGATIVE
KETONES UR STRIP-MCNC: NEGATIVE MG/DL
LEUKOCYTE ESTERASE UR QL STRIP: NEGATIVE
LYMPHOCYTES # BLD AUTO: 2.62 THOUSANDS/ΜL (ref 0.6–4.47)
LYMPHOCYTES NFR BLD AUTO: 39 % (ref 14–44)
MAGNESIUM SERPL-MCNC: 1.9 MG/DL (ref 1.6–2.6)
MCH RBC QN AUTO: 30.6 PG (ref 26.8–34.3)
MCHC RBC AUTO-ENTMCNC: 32.7 G/DL (ref 31.4–37.4)
MCV RBC AUTO: 94 FL (ref 82–98)
MONOCYTES # BLD AUTO: 0.33 THOUSAND/ΜL (ref 0.17–1.22)
MONOCYTES NFR BLD AUTO: 5 % (ref 4–12)
NEUTROPHILS # BLD AUTO: 3.5 THOUSANDS/ΜL (ref 1.85–7.62)
NEUTS SEG NFR BLD AUTO: 52 % (ref 43–75)
NITRITE UR QL STRIP: NEGATIVE
P AXIS: 18 DEGREES
PH UR STRIP.AUTO: 6 [PH] (ref 4.5–8)
PHOSPHATE SERPL-MCNC: 3.2 MG/DL (ref 2.7–4.5)
PLATELET # BLD AUTO: 234 THOUSANDS/UL (ref 149–390)
PMV BLD AUTO: 10.9 FL (ref 8.9–12.7)
POTASSIUM SERPL-SCNC: 3.4 MMOL/L (ref 3.5–5.3)
PR INTERVAL: 104 MS
PROT SERPL-MCNC: 5 G/DL (ref 6.4–8.2)
PROT UR STRIP-MCNC: NEGATIVE MG/DL
QRS AXIS: 30 DEGREES
QRSD INTERVAL: 90 MS
QT INTERVAL: 368 MS
QTC INTERVAL: 397 MS
RBC # BLD AUTO: 3.24 MILLION/UL (ref 3.81–5.12)
SODIUM SERPL-SCNC: 142 MMOL/L (ref 136–145)
SP GR UR STRIP.AUTO: 1.02 (ref 1–1.03)
T WAVE AXIS: 34 DEGREES
T4 FREE SERPL-MCNC: 0.73 NG/DL (ref 0.76–1.46)
TSH SERPL DL<=0.05 MIU/L-ACNC: 1.28 UIU/ML (ref 0.36–3.74)
UROBILINOGEN UR QL STRIP.AUTO: 0.2 E.U./DL
VENTRICULAR RATE: 70 BPM
WBC # BLD AUTO: 6.72 THOUSAND/UL (ref 4.31–10.16)

## 2018-07-11 PROCEDURE — 72156 MRI NECK SPINE W/O & W/DYE: CPT

## 2018-07-11 PROCEDURE — 99220 PR INITIAL OBSERVATION CARE/DAY 70 MINUTES: CPT | Performed by: INTERNAL MEDICINE

## 2018-07-11 PROCEDURE — 81003 URINALYSIS AUTO W/O SCOPE: CPT | Performed by: EMERGENCY MEDICINE

## 2018-07-11 PROCEDURE — A9585 GADOBUTROL INJECTION: HCPCS | Performed by: INTERNAL MEDICINE

## 2018-07-11 PROCEDURE — 82553 CREATINE MB FRACTION: CPT | Performed by: INTERNAL MEDICINE

## 2018-07-11 PROCEDURE — 82550 ASSAY OF CK (CPK): CPT | Performed by: INTERNAL MEDICINE

## 2018-07-11 PROCEDURE — 86140 C-REACTIVE PROTEIN: CPT | Performed by: INTERNAL MEDICINE

## 2018-07-11 PROCEDURE — 84165 PROTEIN E-PHORESIS SERUM: CPT | Performed by: PATHOLOGY

## 2018-07-11 PROCEDURE — 86038 ANTINUCLEAR ANTIBODIES: CPT | Performed by: INTERNAL MEDICINE

## 2018-07-11 PROCEDURE — 84443 ASSAY THYROID STIM HORMONE: CPT | Performed by: INTERNAL MEDICINE

## 2018-07-11 PROCEDURE — G8979 MOBILITY GOAL STATUS: HCPCS | Performed by: PHYSICAL THERAPIST

## 2018-07-11 PROCEDURE — 85025 COMPLETE CBC W/AUTO DIFF WBC: CPT | Performed by: INTERNAL MEDICINE

## 2018-07-11 PROCEDURE — 97163 PT EVAL HIGH COMPLEX 45 MIN: CPT | Performed by: PHYSICAL THERAPIST

## 2018-07-11 PROCEDURE — 87806 HIV AG W/HIV1&2 ANTB W/OPTIC: CPT | Performed by: PSYCHIATRY & NEUROLOGY

## 2018-07-11 PROCEDURE — 83918 ORGANIC ACIDS TOTAL QUANT: CPT | Performed by: INTERNAL MEDICINE

## 2018-07-11 PROCEDURE — 85652 RBC SED RATE AUTOMATED: CPT | Performed by: INTERNAL MEDICINE

## 2018-07-11 PROCEDURE — 82164 ANGIOTENSIN I ENZYME TEST: CPT | Performed by: PSYCHIATRY & NEUROLOGY

## 2018-07-11 PROCEDURE — G8988 SELF CARE GOAL STATUS: HCPCS

## 2018-07-11 PROCEDURE — 99232 SBSQ HOSP IP/OBS MODERATE 35: CPT | Performed by: PHYSICIAN ASSISTANT

## 2018-07-11 PROCEDURE — 82085 ASSAY OF ALDOLASE: CPT | Performed by: INTERNAL MEDICINE

## 2018-07-11 PROCEDURE — 84100 ASSAY OF PHOSPHORUS: CPT | Performed by: INTERNAL MEDICINE

## 2018-07-11 PROCEDURE — 97167 OT EVAL HIGH COMPLEX 60 MIN: CPT

## 2018-07-11 PROCEDURE — 99223 1ST HOSP IP/OBS HIGH 75: CPT | Performed by: PSYCHIATRY & NEUROLOGY

## 2018-07-11 PROCEDURE — 83735 ASSAY OF MAGNESIUM: CPT | Performed by: INTERNAL MEDICINE

## 2018-07-11 PROCEDURE — 86618 LYME DISEASE ANTIBODY: CPT | Performed by: INTERNAL MEDICINE

## 2018-07-11 PROCEDURE — 93010 ELECTROCARDIOGRAM REPORT: CPT | Performed by: INTERNAL MEDICINE

## 2018-07-11 PROCEDURE — 99285 EMERGENCY DEPT VISIT HI MDM: CPT

## 2018-07-11 PROCEDURE — 84166 PROTEIN E-PHORESIS/URINE/CSF: CPT | Performed by: PATHOLOGY

## 2018-07-11 PROCEDURE — 76536 US EXAM OF HEAD AND NECK: CPT

## 2018-07-11 PROCEDURE — 84165 PROTEIN E-PHORESIS SERUM: CPT | Performed by: PSYCHIATRY & NEUROLOGY

## 2018-07-11 PROCEDURE — G8978 MOBILITY CURRENT STATUS: HCPCS | Performed by: PHYSICAL THERAPIST

## 2018-07-11 PROCEDURE — 80053 COMPREHEN METABOLIC PANEL: CPT | Performed by: INTERNAL MEDICINE

## 2018-07-11 PROCEDURE — G8987 SELF CARE CURRENT STATUS: HCPCS

## 2018-07-11 RX ORDER — B-COMPLEX WITH VITAMIN C
1 TABLET ORAL 2 TIMES DAILY WITH MEALS
Status: DISCONTINUED | OUTPATIENT
Start: 2018-07-11 | End: 2018-07-12 | Stop reason: HOSPADM

## 2018-07-11 RX ORDER — ACETAMINOPHEN 325 MG/1
650 TABLET ORAL EVERY 6 HOURS PRN
Status: DISCONTINUED | OUTPATIENT
Start: 2018-07-11 | End: 2018-07-12 | Stop reason: HOSPADM

## 2018-07-11 RX ORDER — AMITRIPTYLINE HYDROCHLORIDE 25 MG/1
25 TABLET, FILM COATED ORAL
Status: DISCONTINUED | OUTPATIENT
Start: 2018-07-11 | End: 2018-07-12 | Stop reason: HOSPADM

## 2018-07-11 RX ORDER — ONDANSETRON 2 MG/ML
4 INJECTION INTRAMUSCULAR; INTRAVENOUS EVERY 6 HOURS PRN
Status: DISCONTINUED | OUTPATIENT
Start: 2018-07-11 | End: 2018-07-12 | Stop reason: HOSPADM

## 2018-07-11 RX ORDER — MELATONIN
1000 DAILY
Status: DISCONTINUED | OUTPATIENT
Start: 2018-07-11 | End: 2018-07-12 | Stop reason: HOSPADM

## 2018-07-11 RX ORDER — CHOLECALCIFEROL (VITAMIN D3) 125 MCG
500 CAPSULE ORAL DAILY
Status: DISCONTINUED | OUTPATIENT
Start: 2018-07-11 | End: 2018-07-12 | Stop reason: HOSPADM

## 2018-07-11 RX ORDER — TOPIRAMATE 25 MG/1
25 TABLET ORAL EVERY 12 HOURS SCHEDULED
Status: DISCONTINUED | OUTPATIENT
Start: 2018-07-11 | End: 2018-07-12 | Stop reason: HOSPADM

## 2018-07-11 RX ORDER — DOCUSATE SODIUM 100 MG/1
100 CAPSULE, LIQUID FILLED ORAL 2 TIMES DAILY
Status: DISCONTINUED | OUTPATIENT
Start: 2018-07-11 | End: 2018-07-12 | Stop reason: HOSPADM

## 2018-07-11 RX ORDER — POTASSIUM CHLORIDE 20 MEQ/1
20 TABLET, EXTENDED RELEASE ORAL DAILY
Status: DISCONTINUED | OUTPATIENT
Start: 2018-07-11 | End: 2018-07-12 | Stop reason: HOSPADM

## 2018-07-11 RX ORDER — METHOCARBAMOL 500 MG/1
750 TABLET, FILM COATED ORAL EVERY 6 HOURS PRN
Status: DISCONTINUED | OUTPATIENT
Start: 2018-07-11 | End: 2018-07-12 | Stop reason: HOSPADM

## 2018-07-11 RX ORDER — SODIUM CHLORIDE AND POTASSIUM CHLORIDE .9; .15 G/100ML; G/100ML
100 SOLUTION INTRAVENOUS CONTINUOUS
Status: DISCONTINUED | OUTPATIENT
Start: 2018-07-11 | End: 2018-07-12 | Stop reason: HOSPADM

## 2018-07-11 RX ADMIN — DOCUSATE SODIUM 100 MG: 100 CAPSULE, LIQUID FILLED ORAL at 09:18

## 2018-07-11 RX ADMIN — POTASSIUM CHLORIDE 20 MEQ: 1500 TABLET, EXTENDED RELEASE ORAL at 09:14

## 2018-07-11 RX ADMIN — Medication 1 G: at 12:31

## 2018-07-11 RX ADMIN — CALCIUM CARBONATE-VITAMIN D TAB 500 MG-200 UNIT 1 TABLET: 500-200 TAB at 09:15

## 2018-07-11 RX ADMIN — CYANOCOBALAMIN TAB 500 MCG 500 MCG: 500 TAB at 09:14

## 2018-07-11 RX ADMIN — DOCUSATE SODIUM 100 MG: 100 CAPSULE, LIQUID FILLED ORAL at 17:30

## 2018-07-11 RX ADMIN — TOPIRAMATE 25 MG: 25 TABLET, FILM COATED ORAL at 00:44

## 2018-07-11 RX ADMIN — CALCIUM CARBONATE-VITAMIN D TAB 500 MG-200 UNIT 1 TABLET: 500-200 TAB at 17:30

## 2018-07-11 RX ADMIN — GADOBUTROL 6 ML: 604.72 INJECTION INTRAVENOUS at 12:00

## 2018-07-11 RX ADMIN — TOPIRAMATE 25 MG: 25 TABLET, FILM COATED ORAL at 21:14

## 2018-07-11 RX ADMIN — AMITRIPTYLINE HYDROCHLORIDE 25 MG: 25 TABLET, FILM COATED ORAL at 21:14

## 2018-07-11 RX ADMIN — ENOXAPARIN SODIUM 40 MG: 40 INJECTION, SOLUTION INTRAVENOUS; SUBCUTANEOUS at 09:18

## 2018-07-11 RX ADMIN — AMITRIPTYLINE HYDROCHLORIDE 25 MG: 25 TABLET, FILM COATED ORAL at 00:45

## 2018-07-11 RX ADMIN — SODIUM CHLORIDE AND POTASSIUM CHLORIDE 100 ML/HR: .9; .15 SOLUTION INTRAVENOUS at 21:14

## 2018-07-11 RX ADMIN — TOPIRAMATE 25 MG: 25 TABLET, FILM COATED ORAL at 09:22

## 2018-07-11 RX ADMIN — METHOCARBAMOL 750 MG: 500 TABLET ORAL at 01:23

## 2018-07-11 RX ADMIN — VITAMIN D, TAB 1000IU (100/BT) 1000 UNITS: 25 TAB at 09:18

## 2018-07-11 RX ADMIN — ACETAMINOPHEN 650 MG: 325 TABLET ORAL at 00:44

## 2018-07-11 RX ADMIN — SODIUM CHLORIDE AND POTASSIUM CHLORIDE 100 ML/HR: .9; .15 SOLUTION INTRAVENOUS at 09:07

## 2018-07-11 NOTE — PHYSICAL THERAPY NOTE
PT Evaluation     07/11/18 0917   Note Type   Note type Eval/Treat   Pain Assessment   Pain Score No Pain   Pain Type (no report of pain other than L hand spasm)   Home Living   Type of [address] [place] [address] One level; Able to live on main level with bedroom/bathroom; Performs ADLs on one level  (no CARLOS)   Bathroom Shower/Tub Tub/shower unit   Bathroom Toilet Standard   Bathroom Accessibility Accessible   Home Equipment (no DME at home)   Prior Function   Level of [place] Independent with ADLs and functional mobility  ((I) ambulation no A  D )   Lives With Spouse   Receives Help From Family   ADL Assistance Independent   IADLs Independent   Comments (I) with driving   Restrictions/Precautions   Other Precautions Fall Risk; Chair Alarm   General   Family/Caregiver Present No   Cognition   Arousal/Participation Alert   Orientation Level Oriented X4   Following Commands Follows all commands and directions without difficulty   RLE Assessment   RLE Assessment WFL   Strength RLE   RLE Overall Strength 3+/5   LLE Assessment   LLE Assessment WFL   Strength LLE   LLE Overall Strength 3+/5   Coordination   Sensation WFL   Light Touch   RLE Light Touch Grossly intact   LLE Light Touch Grossly intact   Bed Mobility   Supine to Sit 5  Supervision   Sit to Supine (seated in chair with alarm on and bell in reach)   Additional Comments pt on initial presentation with L hand and wrist flexion muscle spasm/contracture with inability to break spasm and hypertonicity   after LE strength testing L UE hand/wrist muscle spasm contracture released and pt able to perform ROM with hand and wrist    Transfers   Sit to Stand 5  Supervision   Additional items Bedrails   Stand to Sit 5  Supervision   Additional items Armrests   Stand pivot (CGA )   Additional items Verbal cues   Additional Comments Pt able to perform transfers at supervision level however with ambulation requiring close CGA to min(A) due to LE weakness shakiness and fatigue with knee buckling after 40ft requiring chair to sit  Ambulation/Elevation   Gait pattern Wide LEYDI; Short stride   Gait Assistance 4  Minimal assist   Assistive Device None   Distance 40ft no A D  close CGA to min(A) with knees buckling due to weakness after 40 ft  Balance   Static Sitting Good   Dynamic Sitting Good   Static Standing Fair +   Dynamic Standing Fair   Ambulatory Fair -   Endurance Deficit   Endurance Deficit Yes   Endurance Deficit Description limited ambulation and activity tolerance due to fatigue and LE weakness with buckling   Activity Tolerance   Activity Tolerance Patient limited by fatigue   Assessment   Prognosis Good   Problem List Decreased strength;Decreased range of motion;Decreased endurance; Impaired balance;Decreased mobility   Assessment Pt is a 28year old female presenting with decreased LE strength, intermittent muscle spasm/hypertonicity  L wrist and hand with decreased tolerance for endurance activities and ambulation due to fatigue muscle tremoring and weakness with knee buckling  Pt with limited ambulation tolerance and increased risk for falls due to weakness and knee buckling  Pt is in need of continued activity in PT to improve strength balance endurance and mobility with return to (I) LOF  Goals   Patient Goals To decrease spasms and improve LE strength and to return home   LTG Expiration Date 07/25/18   Long Term Goal #1 (I) with all bed mobility and transfers no A D  Long Term Goal #2 Pt will ambulate 250ft with least restrictive A D  (I) and will ascend/descend 11 steps with HR modified Independent   Plan   Treatment/Interventions Functional transfer training;LE strengthening/ROM; Elevations; Therapeutic exercise; Endurance training;Patient/family training;Bed mobility;Gait training   PT Frequency (3-5x/wk)   Recommendation   Recommendation Home PT;Outpatient PT   PT - OK to Discharge No   Pt with SCD's on when PT entered room   SCD's reapplied and turned on with pt seated in chair at bedside with call bell in reach and chair alarm on

## 2018-07-11 NOTE — CASE MANAGEMENT
Thank you,  Devon Aqq  [facility] Utilization Review Department  Phone: [phone]; Fax [phone]  ATTENTION: The Network Utilization Review Department is now centralized for our 9 Facilities  Make a note that we have a new phone and fax numbers for our Department  Please call with any questions or concerns to [phone] and carefully follow the prompts so that you are directed to the right person  All voicemails are confidential  Fax any determinations, approvals, denials, and requests for initial or continue stay review clinical to [phone]  Due to HIGH CALL volume, it would be easier if you could please send faxed requests to expedite your requests and in part, help us provide discharge notifications faster  Initial Clinical Review    Admission: Date/Time/Statement: [email or web address] UPGRADED TO [email or web address] [email or web address] D/T NEED FOR FURTHER NEUROLOGY WORK-UP, NEURO CHECKS, MRI RESULTS     07/11/18 1443  Inpatient Admission Once     Transfer Service: General Medicine       Question Answer Comment   Admitting Physician [facility]    Level of Care Med Surg    Estimated length of stay More than 2 Midnights    Certification I certify that inpatient services are medically necessary for this patient for a duration of greater than two midnights  See H&P and MD Progress Notes for additional information about the patient's course of treatment  ED: Date/Time/Mode of Arrival:   ED Arrival Information     Expected Arrival Acuity Means of Arrival Escorted By Service Admission Type    - 7/10/2018 20:06 Urgent Wheelchair Family Member General Medicine Urgent    Arrival Complaint    weakness/falls          Chief Complaint:   Chief Complaint   Patient presents with    Weakness - Generalized     pt was d/c'd today after admission for hypokalemia  states bilateral arm weakness and cramping  also states fell 2x since d/c'd once striking head on table  denies LOC          History of Illness: Kayla Costello is a 28 y o  female who presents with   Two separate falls since being discharged from the hospital earlier in the day 7/10/2018       Patient reports 2 separate falls, she states that both of her legs felt like they "gave out", her legs buckled  Patient did not lose consciousness, 3rd event was witnessed by her , she did not fall because he caught her  Again there was no loss of consciousness, patient denies any specific pain, she has no neck or back pain, has a moderate to severe bifrontal headache due to striking her head after the 2nd fall  Patient has no abdominal pain, she has no muscle pain, patient was recently admitted, received IV fluid she does note that she feels somewhat "puffy"  Patient reports ongoing paresthesias in her left upper extremity for over 1 month, patient states that her lower extremity symptoms specifically the weakness have only been occurring for less than 1 week  Denies any previous history of the same  Patient does have a history of gastric bypass, was followed regularly, reports normal blood work in January of 2018, has since been weaned off of all supplements      ED Vital Signs:   ED Triage Vitals [07/10/18 2017]   Temperature Pulse Respirations Blood Pressure SpO2   98 3 °F (36 8 °C) 81 16 130/61 100 %      Temp Source Heart Rate Source Patient Position - Orthostatic VS BP Location FiO2 (%)   Temporal Monitor Sitting Left arm --      Pain Score       7        Wt Readings from Last 1 Encounters:   07/11/18 76 9 kg (169 lb 8 5 oz)       Vital Signs (abnormal):   07/10/18 2145  --  78  16  109/56  --   19 %  --  Lying   07/10/18 2130  --  73  16  107/53  --   9 %  --  Lying   07/10/18 2115  --  72  16  111/54  --  100 %  --  Lying   07/10/18 2100  --  97  16  123/56  --  100 %  --  Lying   07/10/18 2045  --  81  17  124/57  --  100 %  --  Lying   07/10/18 2017  98 3 °F (36 8 °C)  81  16  130/61  --  100 %  None (Room air)  Sitting         Abnormal Labs/Diagnostic Test Results:   HEMOGLOBIN g/dL 11 0*   HEMATOCRIT % 34 1*     CHLOR 109  CA 7 8  TOTAL PRO 5 7  ALK PHOS 37    MRI CERVICAL SPINE: PENDING    Ct Head Wo Contrast     Result Date: 7/10/2018  Impression: No acute intracranial abnormality      Ct Head Without Contrast     Result Date: 7/7/2018     Impression: No acute intracranial abnormality        Ct Spine Cervical Wo Contrast     Result Date: 7/10/2018     Impression: No cervical spine fracture or traumatic malalignment      Mra And Or Mrv Head Wo Contrast     Result Date: 7/9/2018     Impression: Normal MR angiogram of the brain     Mri Brain W Wo Contrast     Result Date: 7/9/2018     Impression: Normal MRI of the brain  No acute intracranial pathology     Vas Carotid Complete Study     Result Date: 7/8/2018  Narrative:  [facility] REPORT CLINICAL: Indications:  Left sided weakness and tingling  Risk Factors The patient has no history of HTN, Diabetes or hyperlipidemia  Clinical Right Pressure:  104/52 mm Hg, Left Pressure:  not obtained due to IV  FINDINGS:  Right        Impression  PSV  EDV (cm/s)  Direction of Flow  Ratio  Dist  ICA                 87          40                      1 00  Mid  ICA                  82          42                      0 95  Prox  ICA    Normal       81          31                      0 93  Dist CCA                  94          29                            Mid CCA                   87          14                      0 77  Prox CCA                 112          21                            Ext Carotid              105          21                      1 21  Prox Vert                 84          19  Antegrade                 Subclavian               108           0                             Left         Impression  PSV  EDV (cm/s)  Direction of Flow  Ratio  Mid  ICA                  78          34                      0 61  Prox   ICA    Normal       83          33                      0 65  Dist CCA 114          28                            Mid CCA                  128          33                      1 07  Prox CCA                 120          33                            Ext Carotid               79          19                      0 61  Prox Vert                 69          25  Antegrade                 Subclavian               137          13                               CONCLUSION: Impression RIGHT: There is no evidence of arterial disease throughout the extracranial carotid system  Vertebral artery flow is antegrade  There is no significant subclavian artery disease  LEFT: There is no evidence of arterial disease throughout the extracranial carotid system  Vertebral artery flow is antegrade  There is no significant subclavian artery disease  Internal carotid artery stenosis determination by consensus criteria from: Wellington Baez et al  Carotid Artery Stenosis:      ED Treatment:   Medication Administration from 07/10/2018 2006 to 07/11/2018 0016     None          Past Medical/Surgical History:    Active Ambulatory Problems     Diagnosis Date Noted    Hypokalemia 07/07/2018    History of gastric bypass 07/07/2018    History of migraine headaches 07/07/2018    Left-sided weakness 07/07/2018    Anemia 07/10/2018    Vitamin D deficiency 07/10/2018     Resolved Ambulatory Problems     Diagnosis Date Noted    Non-traumatic rhabdomyolysis 07/07/2018    Transaminitis 07/07/2018    Hypocalcemia 07/09/2018    Hypophosphatemia 07/09/2018    Hypernatremia 07/09/2018     No Additional Past Medical History       Admitting Diagnosis: Weakness [R53 1]    Age/Sex: 28 y o  female    Assessment/Plan:   * Weakness of both lower extremities   Assessment & Plan      Check MRI cervical spine with and without contrast        Consult Neurology                Weakness of both upper extremities   Assessment & Plan      See plan above          Vitamin B12 deficiency   Assessment & Plan       Check a methylmalonic acid level          Elevated CPK   Assessment & Plan      Check ANA LILIA, ESR, aldolase level, Lyme profile           Vitamin D deficiency   Assessment & Plan      Continue vitamin-D supplement          Hypokalemia   Assessment & Plan      Follow up a m  Labs, patient was profoundly hypokalemic during last hospital stay, since resolved                 VTE Prophylaxis: Enoxaparin (Lovenox)  / sequential compression device   Code Status:  Full code  POLST: There is no POLST form on file for this patient (pre-hospital)     Anticipated Length of Stay:  Patient will be admitted on an Observation basis with an anticipated length of stay of   Less than 2 midnights     Justification for Hospital Stay:  Ongoing gait dysfunction, severe weakness contributing to multiple falls         Admission Orders:  [facility]  PT/OT  SEQ COMP DEVICE  REG DIET  NEURO CHECKS Q4H  CONSULT NEUROLOGY    Scheduled Meds:   Current Facility-Administered Medications:  acetaminophen 650 mg Oral Q6H PRN Theotis Dose, DO   amitriptyline 25 mg Oral HS Theotis Dose, DO   calcium carbonate-vitamin D 1 tablet Oral BID With Meals Theotis Dose, DO   cholecalciferol 1,000 Units Oral Daily Theotis Dose, DO   cyanocobalamin 500 mcg Oral Daily Theotis Dose, DO   docusate sodium 100 mg Oral BID Theotis Dose, DO   enoxaparin 40 mg Subcutaneous Daily Brandt Jarrett, DO   methocarbamol 750 mg Oral Q6H PRN Theotis Dose, DO   ondansetron 4 mg Intravenous Q6H PRN Theotis Dose, DO   potassium chloride 20 mEq Oral Daily Theotis Dose, DO   topiramate 25 mg Oral Q12H [facility] Theotis Dose, DO     Continuous Infusions:    PRN Meds:   Acetaminophen X1    Methocarbamol X1    ondansetron

## 2018-07-11 NOTE — SOCIAL WORK
Cm met with the patient to evaluate the patients prior function and living situation and any barriers to d/c and form a safe d/c plan  Cm also evaluated the patient for any services in the home or needs for services  Pt recently here from 7/7-7/10/18 with hyokalemia; weakness  Pt was dc'd to home and subsequently re admitted on 7/10  Due to bilateral LE weakness; recurrent falls at home  Pt resides at home with her  in a one level house  She typically is independent with her adls and ambulation  No services or DME  Pt drives  PCP is Eric Salamanca in [facility] and pharmacy is [facility] [place]  Plans at this time are home on dc (possible OP therapy if needed)  Cm will continue to follow and assist in dc planning

## 2018-07-11 NOTE — PROGRESS NOTES
Progress Note - Rj Amador 1985, 28 y o  female MRN: [id number]    Unit/Bed#: 405-01 Encounter: [id number]    Primary Care Provider: Surinder Draper MD   Date and time admitted to hospital: 7/10/2018  8:12 PM        * Weakness of both lower extremities   Assessment & Plan    - the patient continues to have bilateral lower extremity weakness   - MRI cervical spine with and without contrast is pending  -MRI brain on 7/9/18 was negative for acute findings  - Consult Neurology  -PT/OT          Weakness of both upper extremities   Assessment & Plan     See plan above        Elevated CPK   Assessment & Plan    -the patient was noted to have an elevated CPK of 968 on 7/7/18 this has been trending down and is noted to be 415 today   -Continue IV fluids   -continue to monitor  -ANA LILIA, aldolase level and lyme profile are pending  -ESR and CRP are normal         Hypokalemia   Assessment & Plan    -patient was profoundly hypokalemic with potassium of 1 8 during last hospital stay   -potassium noted to be 3 4 today   -Po potassium given  -repeat labs in am   -continue to monitor  Vitamin B12 deficiency   Assessment & Plan    -methylmalonic acid level pending   - continue cyanocobalamin 500 mcg PO daily  Vitamin D deficiency   Assessment & Plan     Continue vitamin-D supplement        History of gastric bypass   Assessment & Plan    - outpatient follow up with her gastric bypass surgeon  VTE Pharmacologic Prophylaxis:   Pharmacologic: Enoxaparin (Lovenox)  Mechanical VTE Prophylaxis in Place: Yes    Patient Centered Rounds: I have performed bedside rounds with nursing staff today  Discussions with Specialists or Other Care Team Provider: Nursing, CM, neurology, and attending    Education and Discussions with Family / Patient: n/a    Time Spent for Care: 30 minutes  More than 50% of total time spent on counseling and coordination of care as described above      Current Length of Stay: 0 day(s)    Current Patient Status: Inpatient   Certification Statement: The patient, admitted on an observation basis, will now require > 2 midnight hospital stay due to continued workup of her symptomatology  Discharge Plan: TBD    Code Status: Level 1 - Full Code      Subjective: The patient was seen and examined  The patient continues to have upper and lower extremity weakness  She is also complaining of a headache due to the fall  Objective:     Vitals:   Temp (24hrs), Av 1 °F (36 7 °C), Min:97 8 °F (36 6 °C), Max:98 3 °F (36 8 °C)    HR:  [71-97] 74  Resp:  [13-18] 18  BP: ()/(53-61) 97/54  SpO2:  [9 %-100 %] 98 %  Body mass index is 30 03 kg/m²  Input and Output Summary (last 24 hours): Intake/Output Summary (Last 24 hours) at 18 1502  Last data filed at 18 1108   Gross per 24 hour   Intake              460 ml   Output              725 ml   Net             -265 ml       Physical Exam:     Physical Exam   Constitutional: She is oriented to person, place, and time  She appears well-developed and well-nourished  She is active and cooperative  Cardiovascular: Normal rate, regular rhythm and normal heart sounds  Pulmonary/Chest: Effort normal and breath sounds normal  She has no wheezes  She has no rhonchi  She has no rales  Abdominal: Soft  Normal appearance and bowel sounds are normal  There is no tenderness  Musculoskeletal:   Decreased hand  bilaterally  Neurological: She is alert and oriented to person, place, and time  No cranial nerve deficit  Skin: Skin is warm, dry and intact  Nursing note and vitals reviewed        Additional Data:     Labs:      Results from last 7 days  Lab Units 18  0544   WBC Thousand/uL 6 72   HEMOGLOBIN g/dL 9 9*   HEMATOCRIT % 30 3*   PLATELETS Thousands/uL 234   NEUTROS PCT % 52   LYMPHS PCT % 39   MONOS PCT % 5   EOS PCT % 4       Results from last 7 days  Lab Units 18  0544   SODIUM mmol/L 142   POTASSIUM mmol/L 3 4*   CHLORIDE mmol/L 110*   CO2 mmol/L 24   BUN mg/dL 8   CREATININE mg/dL 0 74   CALCIUM mg/dL 7 6*   TOTAL PROTEIN g/dL 5 0*   BILIRUBIN TOTAL mg/dL 0 20   ALK PHOS U/L 34*   ALT U/L 35   AST U/L 32   GLUCOSE RANDOM mg/dL 95       Results from last 7 days  Lab Units 07/10/18  2058   INR  0 95                 * I Have Reviewed All Lab Data Listed Above  * Additional Pertinent Lab Tests Reviewed: All Labs Within Last 24 Hours Reviewed    Imaging:    Imaging Reports Reviewed Today Include: CT head  Imaging Personally Reviewed by Myself Includes:  none    Recent Cultures (last 7 days):           Last 24 Hours Medication List:     Current Facility-Administered Medications:  acetaminophen 650 mg Oral Q6H PRN Karolee Daub, DO    amitriptyline 25 mg Oral HS Karolee Daub, DO    calcium carbonate-vitamin D 1 tablet Oral BID With Meals Karolee Daub, DO    cholecalciferol 1,000 Units Oral Daily Karolee Daub, DO    cyanocobalamin 500 mcg Oral Daily Karolee Daub, DO    docusate sodium 100 mg Oral BID Karolee Daub, DO    enoxaparin 40 mg Subcutaneous Daily Brandt Jarrett, DO    methocarbamol 750 mg Oral Q6H PRN Karolee Daub, DO    ondansetron 4 mg Intravenous Q6H PRN Karolee Daub, DO    potassium chloride 20 mEq Oral Daily Karolee Daub, DO    sodium chloride 0 9 % with KCl 20 mEq/L 100 mL/hr Intravenous Continuous [employer], DO Last Rate: 100 mL/hr (07/11/18 0907)   topiramate 25 mg Oral Q12H Josiah Scott 13 , DO         Today, Patient Was Seen By: Beltran Sarah PA-C    ** Please Note: Dictation voice to text software may have been used in the creation of this document   **

## 2018-07-11 NOTE — ASSESSMENT & PLAN NOTE
-the patient was noted to have an elevated CPK of 968 on 7/7/18 this has been trending down and is noted to be 415 today   -Continue IV fluids   -continue to monitor  -ANA LILIA, aldolase level and lyme profile are pending    -ESR and CRP are normal

## 2018-07-11 NOTE — ED PROVIDER NOTES
History  Chief Complaint   Patient presents with    Weakness - Generalized     pt was d/c'd today after admission for hypokalemia  states bilateral arm weakness and cramping  also states fell 2x since d/c'd once striking head on table  denies LOC  This is a 62-year-old woman with the noted past medical hx who presents to the emergency department for evaluation of bilateral upper extremity cramping/paresthesias for which she was admitted to this facility on 7 July 2018 when she was found to have critical hypokalemia; she underwent IV and oral potassium repletion and was subsequently discharged home today  States that she continued to have some symptomatology of paresthesias in bilateral upper extremity and generalized myalgias/malaise  Now reports additional sx of b/l LE (L>R) subjective weakness and paresthesias since returning home  States that at home this evening, she experienced 2 mechanical falls in which she lost her balance and fell to a hard floor; did not strike her head on the first episode but did strike her head on the second against edge of table without loss of consciousness  She was able to maneuver herself to a couch afterward where for she lay for some time prior to coming to ED with her   She reports a dull/diffuse bifrontal headache that has occurred since the fall without any associated blurred vision/double vision/nausea/vomiting  Did not take or use anything for symptoms at home  Of note, she has undergone gastric bypass surgery at [facility] years prior  She was kept on multiple supplements postprocedure but states she was eventually discontinued from all of these in consultation with her dietitian  No use of anticoagulant medications  Last tetanus within past 10 yr     Primary survey completed at time of initial evaluation:  Airway patent  Patient phonating normally with no stridor/dysphonia    Cervical collar applied at time of initial evaluation; the objective neurologic deficits she is demonstrating now are identical to those documented during her recent hospital admission, but given the traumatic mechanism this evening, she will undergo CT cervical spine prior to clearance  Lung sounds are present bilaterally with no wheeze/crackles/rhonchi  Distal pulses are present in all extremities  There is no evidence of visible external hemorrhage  GCS 15  BURK x4 with equal tone  Sensation intact in all extremities  Exposure completed to assess for other occult injuries  Secondary survey was completed for all other injuries  Notable findings are reviewed in the full examination section  Plan CT head/C-spine/cxr; will check ekg and broad array of labs for metabolic disturbances  Disposition pending  History provided by:  Patient, significant other and medical records      Prior to Admission Medications   Prescriptions Last Dose Informant Patient Reported? Taking?   amitriptyline (ELAVIL) 100 mg tablet   Yes No   Sig: Take 25 mg by mouth daily at bedtime   calcium carbonate-vitamin D (OSCAL-D) 500 mg-200 units per tablet   No No   Sig: Take 1 tablet by mouth 2 (two) times a day with meals   cholecalciferol (VITAMIN D3) 1,000 units tablet   No No   Sig: Take 1 tablet (1,000 Units total) by mouth daily   cyanocobalamin 500 MCG tablet   No No   Sig: Take 1 tablet (500 mcg total) by mouth daily   meclizine (ANTIVERT) 25 mg tablet   No No   Sig: Take 1 tablet (25 mg total) by mouth every 12 (twelve) hours as needed for dizziness   methocarbamol (ROBAXIN) 750 mg tablet   No No   Sig: Take 1 tablet (750 mg total) by mouth every 6 (six) hours as needed for muscle spasms (PRN hand pain/spasms)   potassium chloride (K-DUR,KLOR-CON) 20 mEq tablet   No No   Sig: Take 1 tablet (20 mEq total) by mouth daily   topiramate (TOPAMAX) 50 MG tablet   Yes No   Sig: Take 25 mg by mouth every 12 (twelve) hours      Facility-Administered Medications: None       History reviewed   No pertinent past medical history  Past Surgical History:   Procedure Laterality Date    ABDOMINAL SURGERY      APPENDECTOMY      CHOLECYSTECTOMY      GASTRIC BYPASS      HYSTERECTOMY         History reviewed  No pertinent family history  I have reviewed and agree with the history as documented  Social History   Substance Use Topics    Smoking status: Never Smoker    Smokeless tobacco: Never Used    Alcohol use Yes      Comment: socially        Review of Systems   Constitutional: Positive for fatigue  Negative for chills and fever  Eyes: Negative for photophobia and visual disturbance  Respiratory: Negative for cough, chest tightness and shortness of breath  Cardiovascular: Negative for chest pain and palpitations  Gastrointestinal: Negative for abdominal pain, diarrhea, nausea and vomiting  Musculoskeletal: Positive for arthralgias and myalgias  Skin: Negative for color change, pallor, rash and wound  Neurological: Positive for dizziness and light-headedness  Negative for syncope, weakness, numbness and headaches  Hematological: Negative for adenopathy  Does not bruise/bleed easily  All other systems reviewed and are negative  Physical Exam  Physical Exam   Constitutional: She is oriented to person, place, and time  She appears well-developed and well-nourished  She is cooperative  No distress  HENT:   Head: Normocephalic and atraumatic  Right Ear: Hearing, tympanic membrane, external ear and ear canal normal    Left Ear: Hearing, tympanic membrane, external ear and ear canal normal    Nose: Nose normal    Mouth/Throat: Uvula is midline, oropharynx is clear and moist and mucous membranes are normal  No oropharyngeal exudate  Eyes: Conjunctivae, EOM and lids are normal  Pupils are equal, round, and reactive to light  Neck: Trachea normal, normal range of motion and phonation normal  Neck supple  No JVD present   No tracheal tenderness, no spinous process tenderness and no muscular tenderness present  No tracheal deviation present  No thyroid mass and no thyromegaly present  Cardiovascular: Normal rate, regular rhythm, S1 normal, S2 normal, normal heart sounds and intact distal pulses  Exam reveals no gallop and no friction rub  No murmur heard  Pulses:       Radial pulses are 2+ on the right side, and 2+ on the left side  Dorsalis pedis pulses are 2+ on the right side, and 2+ on the left side  Posterior tibial pulses are 2+ on the right side, and 2+ on the left side  Pulmonary/Chest: Effort normal and breath sounds normal  No stridor  No respiratory distress  She has no decreased breath sounds  She has no wheezes  She has no rhonchi  She has no rales  She exhibits no tenderness  Abdominal: Soft  She exhibits no distension and no mass  There is no tenderness  There is no rigidity, no rebound, no guarding and no CVA tenderness  Musculoskeletal: Normal range of motion  She exhibits no edema, tenderness or deformity  Cervical back: Normal         Thoracic back: Normal         Lumbar back: Normal    Lymphadenopathy:     She has no cervical adenopathy  Neurological: She is alert and oriented to person, place, and time  A sensory deficit is present  No cranial nerve deficit  She exhibits normal muscle tone  GCS eye subscore is 4  GCS verbal subscore is 5  GCS motor subscore is 6  Reflex Scores:       Tricep reflexes are 2+ on the right side and 2+ on the left side  Bicep reflexes are 2+ on the right side  Brachioradialis reflexes are 2+ on the right side and 2+ on the left side  PERRLA; EOMI  Sensation intact to light touch over face in V1-V3 distribution bilaterally  Facial expressions symmetric  Tongue/uvula midline  Shoulder shrug equal bilaterally  Mild hyperesthesia to R hand in distal aspect of all digits compared to LUE; otherwise intact to sharp/dull in UE/LE bilaterally      Strength 5/5 in RUE/RLE at shoulder/elbow/wrist and hip/knee/ankle; 3/5 in LUE at shoulder/elbow/wrist and LLE at hip/knee/ankle  Skin: Skin is warm, dry and intact  No rash noted  She is not diaphoretic  No erythema  Psychiatric: She has a normal mood and affect  Her speech is delayed (Responses are slowed but appropriate to questions asked)  She is slowed and withdrawn  Cognition and memory are normal  She exhibits normal recent memory (Able to recall details from recent hospitalization and preceding events this evening) and normal remote memory  Nursing note and vitals reviewed        Vital Signs  ED Triage Vitals [07/10/18 2017]   Temperature Pulse Respirations Blood Pressure SpO2   98 3 °F (36 8 °C) 81 16 130/61 100 %      Temp Source Heart Rate Source Patient Position - Orthostatic VS BP Location FiO2 (%)   Temporal Monitor Sitting Left arm --      Pain Score       7           Vitals:    07/10/18 2245 07/10/18 2330 07/10/18 2345 07/11/18 0019   BP: 108/58 123/56 116/56 119/58   Pulse: 74 80 81 86   Patient Position - Orthostatic VS: Lying   Lying       Visual Acuity  Visual Acuity      Most Recent Value   L Pupil Size (mm)  3   R Pupil Size (mm)  3          ED Medications  Medications   amitriptyline (ELAVIL) tablet 25 mg (not administered)   calcium carbonate-vitamin D (OSCAL-D) 500 mg-200 units per tablet 1 tablet (not administered)   cholecalciferol (VITAMIN D3) tablet 1,000 Units (not administered)   cyanocobalamin (VITAMIN B-12) tablet 500 mcg (not administered)   potassium chloride (K-DUR,KLOR-CON) CR tablet 20 mEq (not administered)   topiramate (TOPAMAX) tablet 25 mg (not administered)   docusate sodium (COLACE) capsule 100 mg (not administered)   ondansetron (ZOFRAN) injection 4 mg (not administered)   enoxaparin (LOVENOX) subcutaneous injection 40 mg (not administered)   acetaminophen (TYLENOL) tablet 650 mg (not administered)       Diagnostic Studies  Results Reviewed     Procedure Component Value Units Date/Time    [place] draw [[id number]] Collected:  07/10/18 2058    Lab Status:  Final result Specimen:  Blood Updated:  07/10/18 2301    Narrative: The following orders were created for panel order [place] draw  Procedure                               Abnormality         Status                     ---------                               -----------         ------                     Carlean Brittle / Black tube on HNQS[[id number]]                        Final result                 Please view results for these tests on the individual orders  Hepatic function panel [[id number]]  (Abnormal) Collected:  07/10/18 2155    Lab Status:  Final result Specimen:  Blood Updated:  07/10/18 2209     Total Bilirubin 0 20 mg/dL      Bilirubin, Direct 0 05 mg/dL      Alkaline Phosphatase 37 (L) U/L      AST 42 U/L      ALT 40 U/L      Total Protein 5 7 (L) g/dL      Albumin 2 4 (L) g/dL     TSH [[id number]]  (Normal) Collected:  07/10/18 2058    Lab Status:  Final result Specimen:  Blood from Arm, Right Updated:  07/10/18 2150     TSH 3RD GENERATON 0 783 uIU/mL     Narrative: The recommended reference ranges for TSH during pregnancy are as follows:  First trimester 0 1 to 2 5 uIU/mL  Second trimester  0 2 to 3 0 uIU/mL  Third trimester 0 3 to 3 0 uIU/m      Patients undergoing fluorescein dye angiography may retain small amounts of fluorescein in the body for 48-72 hours post procedure  Samples containing fluorescein can produce falsely depressed TSH values  If the patient had this procedure,a specimen should be resubmitted post fluorescein clearance      hCG, qualitative pregnancy [[id number]]  (Normal) Collected:  07/10/18 2058    Lab Status:  Final result Specimen:  Blood from Arm, Right Updated:  07/10/18 2133     Preg, Serum Negative    Basic metabolic panel [[id number]]  (Abnormal) Collected:  07/10/18 2058    Lab Status:  Final result Specimen:  Blood from Arm, Right Updated:  07/10/18 2133     Sodium 141 mmol/L      Potassium 4 0 mmol/L      Chloride 109 (H) mmol/L      CO2 25 mmol/L      Anion Gap 7 mmol/L      BUN 9 mg/dL      Creatinine 0 69 mg/dL      Glucose 82 mg/dL      Calcium 7 8 (L) mg/dL      eGFR 114 ml/min/1 73sq m     Narrative:         National Kidney Disease Education Program recommendations are as follows:  GFR calculation is accurate only with a steady state creatinine  Chronic Kidney disease less than 60 ml/min/1 73 sq  meters  Kidney failure less than 15 ml/min/1 73 sq  meters  Magnesium [[id number]]  (Normal) Collected:  07/10/18 2058    Lab Status:  Final result Specimen:  Blood from Arm, Right Updated:  07/10/18 2130     Magnesium 2 0 mg/dL     Phosphorus [[id number]]  (Normal) Collected:  07/10/18 2058    Lab Status:  Final result Specimen:  Blood from Arm, Right Updated:  07/10/18 2130     Phosphorus 2 8 mg/dL     T4, free [[id number]] Collected:  07/10/18 2058    Lab Status:   In process Specimen:  Blood from Arm, Right Updated:  07/10/18 2124    Protime-INR [[id number]]  (Normal) Collected:  07/10/18 2058    Lab Status:  Final result Specimen:  Blood from Arm, Right Updated:  07/10/18 2111     Protime 12 2 seconds      INR 0 95    CBC and differential [[id number]]  (Abnormal) Collected:  07/10/18 2058    Lab Status:  Final result Specimen:  Blood from Arm, Right Updated:  07/10/18 2104     WBC 7 65 Thousand/uL      RBC 3 66 (L) Million/uL      Hemoglobin 11 0 (L) g/dL      Hematocrit 34 1 (L) %      MCV 93 fL      MCH 30 1 pg      MCHC 32 3 g/dL      RDW 15 1 %      MPV 10 7 fL      Platelets 389 Thousands/uL      Neutrophils Relative 55 %      Lymphocytes Relative 33 %      Monocytes Relative 8 %      Eosinophils Relative 4 %      Basophils Relative 1 %      Neutrophils Absolute 4 22 Thousands/µL      Lymphocytes Absolute 2 51 Thousands/µL      Monocytes Absolute 0 60 Thousand/µL      Eosinophils Absolute 0 28 Thousand/µL      Basophils Absolute 0 04 Thousands/µL     UA w Reflex to Microscopic w Reflex to Culture [[id number]]     Lab Status:  No result Specimen:  Urine                  XR chest 1 view portable   ED Interpretation by Elie Richards DO (07/10 2304)   Airway is midline  Lungs are clear bilaterally with no evidence of pulmonary vascular congestion/focal infiltrate/pleural effusion//pneumothorax  Cardiac and mediastinal silhouettes are within normal limits  Osseous structures appear normal       CT head wo contrast   Final Result by Derrell Plummer MD (07/10 2129)      No acute intracranial abnormality  Workstation performed: [id number]         CT spine cervical wo contrast   Final Result by Natasha Betancourt MD (07/10 2127)      No cervical spine fracture or traumatic malalignment  Workstation performed: [id number]                    Procedures  ECG 12 Lead Documentation  Date/Time: 7/10/2018 9:15 PM  Performed by: Sarita Toussaint  Authorized by: Sarita Toussaint     Indications / Diagnosis:  Weakness/paralysis  ECG reviewed by me, the ED Provider: yes    Patient location:  ED  Previous ECG:     Comparison to cardiac monitor: Yes    Interpretation:     Interpretation: abnormal    Rate:     ECG rate:  70    ECG rate assessment: normal    Rhythm:     Rhythm: sinus rhythm    Ectopy:     Ectopy: none    QRS:     QRS axis:  Normal    QRS intervals:  Normal  Conduction:     Conduction: abnormal      Abnormal conduction comment:  Short MD  ST segments:     ST segments:  Normal  T waves:     T waves: normal    Comments:      Pr 104 qrs 90 qtc 397           Phone Contacts  ED Phone Contact    ED Course  ED Course as of Jul 11 0038   Tue Jul 10, 2018   2151 1  WBC wnl   2  Hg/Hct shows mild anemia similar to prior  3  Plt wnl   4  Electrolytes show hypocalcemia and mild hyperchloremia; will check albumin to assess if corrected calcium wnl   5  INR wnl   6  Beta HCG negative  7  TSH wnl  T4 pending  2157 Patient re-evaluated after negative CT C spine   Patient has no midline C spine tenderness, no apparent intoxication or altered mental status, no focal neuro deficits, and no distracting injuries  Cervical collar removed  2218 Hepatic function panel: hypoalbuminemia  Transaminases wnl  Corrected calcium 9 1 and is wnl       2310: Extensive review of workup with patient/; no clear etiology identified for sx but patient has new LE symptoms of unclear etiology  Previous hospital workup was extensive (CT/MRI/MRA+V/carotid duplex) without clear etiology for symptoms  Given this diagnostic uncertainty and the new superimposed sx, I offered admission although I was unsure of what further diagnostic/therapeutic interventions would be offered at this time  Patient agreeable to admission  D/w Dr Daron Levy who requested observation admission  MDM  Number of Diagnoses or Management Options  Closed head injury: new and does not require workup  Fall at home: new and does not require workup  Hypocalcemia: new and does not require workup  Lower extremity weakness: new and requires workup     Amount and/or Complexity of Data Reviewed  Clinical lab tests: ordered and reviewed  Tests in the radiology section of CPT®: ordered and reviewed  Decide to obtain previous medical records or to obtain history from someone other than the patient: yes  Obtain history from someone other than the patient: yes  Review and summarize past medical records: yes  Discuss the patient with other providers: yes  Independent visualization of images, tracings, or specimens: yes    Risk of Complications, Morbidity, and/or Mortality  Presenting problems: high  Diagnostic procedures: high  Management options: moderate    Patient Progress  Patient progress: stable    The patient presented with a condition in which there was a high probability of imminent or life-threatening deterioration, and critical care services (excluding separately billable procedures) totalled 30-74 minutes          Disposition  Final diagnoses:   Lower extremity weakness   Fall at home   Closed head injury   Hypocalcemia     Time reflects when diagnosis was documented in both MDM as applicable and the Disposition within this note     Time User Action Codes Description Comment    7/11/2018 12:37 AM Stan Weston Add [R29 898] Lower extremity weakness     7/11/2018 12:37 AM Stan Weston Add [G88  Goyo Clement,  J79 620] Fall at home     7/11/2018 12:37 AM Stan Weston Add [S09 90XA] Closed head injury     7/11/2018 12:37 AM Stan Weston Add [E83 51] Hypocalcemia       ED Disposition     ED Disposition Condition Comment    Admit  Case was discussed with Dr Sonia Damico and the patient's admission status was agreed to be Admission Status: observation status to the service of Dr Sonia Damico  Follow-up Information    None         Current Discharge Medication List      CONTINUE these medications which have NOT CHANGED    Details   amitriptyline (ELAVIL) 100 mg tablet Take 25 mg by mouth daily at bedtime      calcium carbonate-vitamin D (OSCAL-D) 500 mg-200 units per tablet Take 1 tablet by mouth 2 (two) times a day with meals  Qty: 60 tablet, Refills: 0    Associated Diagnoses: Hypocalcemia      cholecalciferol (VITAMIN D3) 1,000 units tablet Take 1 tablet (1,000 Units total) by mouth daily  Qty: 30 tablet, Refills: 0    Comments:  For vitamin D deficiency  Associated Diagnoses: Vitamin D deficiency      cyanocobalamin 500 MCG tablet Take 1 tablet (500 mcg total) by mouth daily  Qty: 30 tablet, Refills: 0    Associated Diagnoses: Anemia, unspecified type      meclizine (ANTIVERT) 25 mg tablet Take 1 tablet (25 mg total) by mouth every 12 (twelve) hours as needed for dizziness  Refills: 0    Associated Diagnoses: Left-sided weakness      methocarbamol (ROBAXIN) 750 mg tablet Take 1 tablet (750 mg total) by mouth every 6 (six) hours as needed for muscle spasms (PRN hand pain/spasms)  Qty: 10 tablet, Refills: 0    Comments: You cannot drive or drink alcohol while taking or after taking this medication  Associated Diagnoses: Hypokalemia      potassium chloride (K-DUR,KLOR-CON) 20 mEq tablet Take 1 tablet (20 mEq total) by mouth daily  Qty: 30 tablet, Refills: 0    Associated Diagnoses: Hypokalemia      topiramate (TOPAMAX) 50 MG tablet Take 25 mg by mouth every 12 (twelve) hours           No discharge procedures on file      ED Provider  Electronically Signed by           Nicolas Galvan DO  07/11/18 4055

## 2018-07-11 NOTE — ASSESSMENT & PLAN NOTE
- the patient continues to have bilateral lower extremity weakness   - MRI cervical spine with and without contrast is pending  -MRI brain on 7/9/18 was negative for acute findings    - Consult Neurology  -PT/OT

## 2018-07-11 NOTE — H&P
H&P- Karlee Whelan 1985, 28 y o  female MRN: [id number]    Unit/Bed#: 405-01 Encounter: [id number]    Primary Care Provider: Blake Ferrer MD   Date and time admitted to hospital: 7/10/2018  8:12 PM        * Weakness of both lower extremities   Assessment & Plan     Check MRI cervical spine with and without contrast       Consult Neurology            Weakness of both upper extremities   Assessment & Plan     See plan above        Vitamin B12 deficiency   Assessment & Plan      Check a methylmalonic acid level        Elevated CPK   Assessment & Plan     Check ANA LILIA, ESR, aldolase level, Lyme profile  Vitamin D deficiency   Assessment & Plan     Continue vitamin-D supplement        Hypokalemia   Assessment & Plan     Follow up a m  Labs, patient was profoundly hypokalemic during last hospital stay, since resolved  VTE Prophylaxis: Enoxaparin (Lovenox)  / sequential compression device   Code Status:  Full code  POLST: There is no POLST form on file for this patient (pre-hospital)    Anticipated Length of Stay:  Patient will be admitted on an Observation basis with an anticipated length of stay of   Less than 2 midnights  Justification for Hospital Stay:  Ongoing gait dysfunction, severe weakness contributing to multiple falls    Chief Complaint:    Recurrent falls, bilateral lower extremity weakness  History of Present Illness:    Karlee Whelan is a 28 y o  female who presents with   Two separate falls since being discharged from the hospital earlier in the day 7/10/2018  Patient reports 2 separate falls, she states that both of her legs felt like they "gave out", her legs buckled  Patient did not lose consciousness, 3rd event was witnessed by her , she did not fall because he caught her    Again there was no loss of consciousness, patient denies any specific pain, she has no neck or back pain, has a moderate to severe bifrontal headache due to striking her head after the 2nd fall       Patient denies any history of fever/chills, no rashes or skin lesions, no diarrhea, no dysuria or increased urinary frequency  No reported cough, no recent travel, no known exposure to any ticks  Patient has no abdominal pain, she has no muscle pain, patient was recently admitted, received IV fluid she does note that she feels somewhat "puffy"  Patient reports ongoing paresthesias in her left upper extremity for over 1 month, patient states that her lower extremity symptoms specifically the weakness have only been occurring for less than 1 week  Denies any previous history of the same  Patient does have a history of gastric bypass, was followed regularly, reports normal blood work in January of 2018, has since been weaned off of all supplements, patient reports good p o  Intake, no anorexia, no nausea vomiting or diarrhea  Review of Systems:    Review of Systems   Constitutional: Positive for fatigue  Negative for fever  HENT: Negative  Eyes: Negative  Negative for photophobia, pain, itching and visual disturbance  Respiratory: Negative  Negative for cough, chest tightness and shortness of breath  Cardiovascular: Negative  Gastrointestinal: Negative  Endocrine: Negative  Genitourinary: Negative  Musculoskeletal: Positive for gait problem  Negative for arthralgias, back pain, joint swelling, myalgias, neck pain and neck stiffness  Skin: Negative  Allergic/Immunologic: Negative  Neurological: Positive for weakness and numbness  Negative for dizziness, tremors, seizures, syncope, speech difficulty and light-headedness  Hematological: Negative  Psychiatric/Behavioral: Negative  All other systems reviewed and are negative  Past Medical and Surgical History:     History reviewed  No pertinent past medical history      Past Surgical History:   Procedure Laterality Date    ABDOMINAL SURGERY      APPENDECTOMY      CHOLECYSTECTOMY      GASTRIC BYPASS  HYSTERECTOMY         Meds/Allergies:    Prior to Admission medications    Medication Sig Start Date End Date Taking? Authorizing Provider   amitriptyline (ELAVIL) 100 mg tablet Take 25 mg by mouth daily at bedtime    Historical Provider, MD   calcium carbonate-vitamin D (OSCAL-D) 500 mg-200 units per tablet Take 1 tablet by mouth 2 (two) times a day with meals 7/10/18   [facility], DO   cholecalciferol (VITAMIN D3) 1,000 units tablet Take 1 tablet (1,000 Units total) by mouth daily 7/11/18   [facility], DO   cyanocobalamin 500 MCG tablet Take 1 tablet (500 mcg total) by mouth daily 7/11/18   [facility], DO   meclizine (ANTIVERT) 25 mg tablet Take 1 tablet (25 mg total) by mouth every 12 (twelve) hours as needed for dizziness 7/10/18   [facility], DO   methocarbamol (ROBAXIN) 750 mg tablet Take 1 tablet (750 mg total) by mouth every 6 (six) hours as needed for muscle spasms (PRN hand pain/spasms) 7/10/18   [facility], DO   potassium chloride (K-DUR,KLOR-CON) 20 mEq tablet Take 1 tablet (20 mEq total) by mouth daily 7/11/18   [facility], DO   topiramate (TOPAMAX) 50 MG tablet Take 25 mg by mouth every 12 (twelve) hours    Historical Provider, MD     I have reviewed home medications with patient personally      Allergies: No Known Allergies    Social History:     Marital Status: /Civil Union   Substance Use History:   History   Alcohol Use    Yes     Comment: socially     History   Smoking Status    Never Smoker   Smokeless Tobacco    Never Used     History   Drug Use No       Family History:    non-contributory    Physical Exam:     Vitals:   Blood Pressure: 119/58 (07/11/18 0019)  Pulse: 86 (07/11/18 0019)  Temperature: 98 1 °F (36 7 °C) (07/11/18 0019)  Temp Source: Temporal (07/11/18 0019)  Respirations: 18 (07/11/18 0019)  Height: 5' 3" (160 cm) (07/11/18 0019)  Weight - Scale: 76 9 kg (169 lb 8 5 oz) (07/11/18 0019)  SpO2: 100 % (07/11/18 0019)    Physical Exam   Constitutional: She is oriented to person, place, and time  She appears well-developed and well-nourished  No distress  HENT:   Head: Normocephalic  Small superficial abrasion middle of forehead   Eyes: Conjunctivae and EOM are normal  Right eye exhibits no discharge  Left eye exhibits no discharge  No scleral icterus  Neck: Normal range of motion  Neck supple  No tracheal deviation present  No thyromegaly present  Cardiovascular: Normal rate, regular rhythm and intact distal pulses  Exam reveals no friction rub  No murmur heard  Pulmonary/Chest: Effort normal and breath sounds normal  No respiratory distress  She has no wheezes  Abdominal: Soft  Bowel sounds are normal  She exhibits no distension  There is no tenderness  Musculoskeletal: Normal range of motion  She exhibits edema (Trace edema present in all 4 extremities)  Neurological: She is alert and oriented to person, place, and time  No cranial nerve deficit  Coordination normal    Decreased muscle strength in all extremities, decreased  strength bilaterally, appears to be symmetric     I did not appreciate any laterality to the weakness  Skin: Skin is warm and dry  No rash noted  She is not diaphoretic  No erythema  No pallor  Psychiatric:   Flat affect,  Slow to respond to questions  Nursing note and vitals reviewed  Additional Data:     Lab Results: I have personally reviewed pertinent reports          Results from last 7 days  Lab Units 07/10/18  2058   WBC Thousand/uL 7 65   HEMOGLOBIN g/dL 11 0*   HEMATOCRIT % 34 1*   PLATELETS Thousands/uL 262   NEUTROS PCT % 55   LYMPHS PCT % 33   MONOS PCT % 8   EOS PCT % 4       Results from last 7 days  Lab Units 07/10/18  2155 07/10/18  2058   SODIUM mmol/L  --  141   POTASSIUM mmol/L  --  4 0   CHLORIDE mmol/L  --  109*   CO2 mmol/L  --  25   BUN mg/dL  --  9   CREATININE mg/dL  --  0 69   CALCIUM mg/dL  --  7 8*   TOTAL PROTEIN g/dL 5 7*  --    BILIRUBIN TOTAL mg/dL 0 20  --    ALK PHOS U/L 37*  --    ALT U/L 40  --    AST U/L 42  --    GLUCOSE RANDOM mg/dL  --  82       Results from last 7 days  Lab Units 07/10/18  2058   INR  0 95       Imaging: I have personally reviewed pertinent reports  Ct Head Wo Contrast    Result Date: 7/10/2018  Narrative: CT BRAIN - WITHOUT CONTRAST INDICATION:   closed head injury x2 from fall this evening; confusion/disorientation  COMPARISON:  None  TECHNIQUE:  CT examination of the brain was performed  In addition to axial images, coronal 2D reformatted images were created and submitted for interpretation  Radiation dose length product (DLP) for this visit:  990 68 mGy-cm   This examination, like all CT scans performed in the [facility], was performed utilizing techniques to minimize radiation dose exposure, including the use of iterative  reconstruction and automated exposure control  IMAGE QUALITY:  Diagnostic  FINDINGS: PARENCHYMA:  No intracranial mass, mass effect or midline shift  No CT signs of acute infarction  No acute parenchymal hemorrhage  VENTRICLES AND EXTRA-AXIAL SPACES:  Normal for the patient's age  VISUALIZED ORBITS AND PARANASAL SINUSES:  Unremarkable  CALVARIUM AND EXTRACRANIAL SOFT TISSUES:  Normal      Impression: No acute intracranial abnormality  Workstation performed: [id number]     Ct Head Without Contrast    Result Date: 7/7/2018  Narrative: CT BRAIN - WITHOUT CONTRAST INDICATION:   left hand weakness- fine and gross motor 3 weeks, left leg weakness gross motor  lightheaded    COMPARISON:  None  TECHNIQUE:  CT examination of the brain was performed  In addition to axial images, coronal 2D reformatted images were created and submitted for interpretation  Radiation dose length product (DLP) for this visit:  968 mGy-cm     This examination, like all CT scans performed in the [facility], was performed utilizing techniques to minimize radiation dose exposure, including the use of iterative reconstruction and automated exposure control  IMAGE QUALITY:  Diagnostic  FINDINGS: PARENCHYMA:  No intracranial mass, mass effect or midline shift  No CT signs of acute infarction  No acute parenchymal hemorrhage  VENTRICLES AND EXTRA-AXIAL SPACES:  Normal for the patient's age  VISUALIZED ORBITS AND PARANASAL SINUSES:  Unremarkable  CALVARIUM AND EXTRACRANIAL SOFT TISSUES:  Normal      Impression: No acute intracranial abnormality  Workstation performed: [id number]     Ct Spine Cervical Wo Contrast    Result Date: 7/10/2018  Narrative: CT CERVICAL SPINE - WITHOUT CONTRAST INDICATION:   Neck pain and trauma  COMPARISON: None  TECHNIQUE:  CT examination of the cervical spine was performed without intravenous contrast   Contiguous axial images were obtained  Sagittal and coronal reconstructions were performed  Radiation dose length product (DLP) for this visit:  438 46 mGy-cm   This examination, like all CT scans performed in the [facility], was performed utilizing techniques to minimize radiation dose exposure, including the use of iterative  reconstruction and automated exposure control  IMAGE QUALITY:  Diagnostic  FINDINGS: ALIGNMENT:  Normal alignment of the cervical spine  No subluxation  VERTEBRAL BODIES:  No fracture  DEGENERATIVE CHANGES:  No significant cervical degenerative changes are noted  PREVERTEBRAL AND PARASPINAL SOFT TISSUES:  Unremarkable  THORACIC INLET:  Normal      Impression: No cervical spine fracture or traumatic malalignment  Workstation performed: [id number]     Mra And Or Mrv Head Wo Contrast    Result Date: 7/9/2018  Narrative: MRA BRAIN INDICATION:  LEFT SIDED WEAKNESS   TIA/CVA  COMPARISON:  None  TECHNIQUE:  Axial 3-D time-of-flight imaging with 3-D reconstructions  FINDINGS: IMAGE QUALITY:  Diagnostic   ANATOMY INTERNAL CAROTID ARTERIES:  Normal flow related enhancement of the distal cervical, petrous and cavernous segments of the internal carotid arteries  Normal ICA terminus  ANTERIOR CIRCULATION:  Normal A1 segments  Normal anterior communicating artery  Normal flow-related enhancement of the anterior cerebral arteries  MIDDLE CEREBRAL ARTERY CIRCULATION:  The M1 segment and middle cerebral artery branches demonstrate normal flow-related enhancement  DISTAL VERTEBRAL ARTERIES:  Distal left vertebral artery is dominant  The distal right vertebral artery is slightly hypoplastic but consistent in caliber without stenosis  BASILAR ARTERY:  Normal  POSTERIOR CEREBRAL ARTERIES:  Both posterior cerebral arteries arises from the basilar tip  Both arteries demonstrate normal flow-related enhancement  Normal posterior communicating arteries  Impression: Normal MR angiogram of the brain  Workstation performed: [id number]     Mri Brain W Wo Contrast    Result Date: 7/9/2018  Narrative: MRI BRAIN WITH AND WITHOUT CONTRAST INDICATION: LEFT SIDED WEAKNESS  COMPARISON:  None  TECHNIQUE: Sagittal T1, axial T2, axial FLAIR, axial T1, axial Gradient, axial diffusion  Sagittal, axial and coronal T1 postcontrast   Axial BRAVO post contrast   IV Contrast:  6 cc of Gadavist injected intravenously without immediate consequence  IMAGE QUALITY:   Diagnostic  FINDINGS: BRAIN PARENCHYMA:  There is no discrete mass, mass effect or midline shift  No abnormal white matter signal identified  Brainstem and cerebellum demonstrate normal signal  There is no intracranial hemorrhage  There is no evidence of acute infarction and  diffusion imaging is unremarkable  Postcontrast imaging of the brain demonstrates no abnormal enhancement  VENTRICLES:  Normal  SELLA AND PITUITARY GLAND:  Normal  ORBITS:  Normal  PARANASAL SINUSES:  Normal  VASCULATURE:  Evaluation of the major intracranial vasculature demonstrates appropriate flow voids  CALVARIUM AND SKULL BASE:  Normal  EXTRACRANIAL SOFT TISSUES:  Normal      Impression: Normal MRI of the brain  No acute intracranial pathology  Workstation performed: [id number]     Vas Carotid Complete Study    Result Date: 7/8/2018  Narrative:  [facility] REPORT CLINICAL: Indications:  Left sided weakness and tingling  Risk Factors The patient has no history of HTN, Diabetes or hyperlipidemia  Clinical Right Pressure:  104/52 mm Hg, Left Pressure:  not obtained due to IV  FINDINGS:  Right        Impression  PSV  EDV (cm/s)  Direction of Flow  Ratio  Dist  ICA                 87          40                      1 00  Mid  ICA                  82          42                      0 95  Prox  ICA    Normal       81          31                      0 93  Dist CCA                  94          29                            Mid CCA                   87          14                      0 77  Prox CCA                 112          21                            Ext Carotid              105          21                      1 21  Prox Vert                 84          19  Antegrade                 Subclavian               108           0                             Left         Impression  PSV  EDV (cm/s)  Direction of Flow  Ratio  Mid  ICA                  78          34                      0 61  Prox  ICA    Normal       83          33                      0 65  Dist CCA                 114          28                            Mid CCA                  128          33                      1 07  Prox CCA                 120          33                            Ext Carotid               79          19                      0 61  Prox Vert                 69          25  Antegrade                 Subclavian               137          13                               CONCLUSION: Impression RIGHT: There is no evidence of arterial disease throughout the extracranial carotid system  Vertebral artery flow is antegrade  There is no significant subclavian artery disease  LEFT: There is no evidence of arterial disease throughout the extracranial carotid system  Vertebral artery flow is antegrade  There is no significant subclavian artery disease  Internal carotid artery stenosis determination by consensus criteria from: Linn San , et al  Carotid Artery Stenosis: Gray-Scale and Doppler US Diagnosis - Society of Radiologists in [address] [place] [address], Radiology 2003; [phone]  SIGNATURE: Electronically Signed by: Mary Cardoso MD, [address] on 2018-07-08 01:34:47 PM    ** Please Note: This note has been constructed using a voice recognition system   **

## 2018-07-11 NOTE — OCCUPATIONAL THERAPY NOTE
Occupational Therapy Evaluation     Patient Name: Blayne Hayes  AGGHO'I Date: 7/11/2018  Problem List  Patient Active Problem List   Diagnosis    Hypokalemia    History of gastric bypass    History of migraine headaches    Left-sided weakness    Anemia    Vitamin D deficiency    Weakness of both lower extremities    Weakness of both upper extremities    Elevated CPK    Vitamin B12 deficiency     Past Medical History  History reviewed  No pertinent past medical history  Past Surgical History  Past Surgical History:   Procedure Laterality Date    ABDOMINAL SURGERY      APPENDECTOMY      CHOLECYSTECTOMY      GASTRIC BYPASS      HYSTERECTOMY             07/11/18 0840   Note Type   Note type Eval/Treat   Restrictions/Precautions   Weight Bearing Precautions Per Order No   Other Precautions Fall Risk; Chair Alarm   Pain Assessment   Pain Assessment No/denies pain   Home Living   Type of [address] [place] [address] One level;Performs ADLs on one level; Able to live on main level with bedroom/bathroom   Bathroom Shower/Tub Tub/shower unit   Bathroom Toilet Standard   Bathroom Accessibility Accessible   Home Equipment (no DME )   Prior Function   Level of [place] Independent with ADLs and functional mobility   Lives With Spouse   ADL Assistance Independent   IADLs Independent   Falls in the last 6 months 1 to 4   Vocational 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    Psychosocial   Psychosocial (WDL) WDL   Bed Mobility   Supine to Sit 5  Supervision   Additional items Bedrails   Sit to Supine (seated in chair at end of session)   Transfers   Sit to Stand 5  Supervision   Additional items Increased time required   Stand to Sit 5  Supervision   Additional items Increased time required   Functional Mobility   Functional Mobility 4  Minimal assistance   Additional Comments pt performed FM in hallway and reported "legs are weak" multiple times; pt with knee buckling episode in hallway and required chair to be brought in hallway and transport back to pt room   Balance   Static Sitting Good   Dynamic Sitting Good   Static Standing Fair +   Dynamic Standing Fair   Ambulatory Fair -   Activity Tolerance   Activity Tolerance Patient limited by fatigue   RUE Assessment   RUE Assessment X  (3+/5 grossly;WFL AROM; decreased  strength)   LUE Assessment   LUE Assessment X  (wrist in flexion contracture and fingers flexed contracture)   Hand Function   Gross Motor Coordination Impaired   Fine Motor Coordination Impaired   Sensation   Light Touch No apparent deficits   Sharp/Dull No apparent deficits   Cognition   Overall Cognitive Status WFL   Arousal/Participation Alert   Attention Within functional limits   Orientation Level Oriented X4   Memory Within functional limits   Following Commands Follows all commands and directions without difficulty   Assessment   Limitation Decreased ADL status; Decreased UE strength;Decreased Safe judgement during ADL;Decreased endurance;Decreased fine motor control;Decreased self-care trans;Decreased high-level ADLs   Assessment pt presents at evaluation performing at overall (S) level with no device during FM; pt with L wrist/digit flexion contracture while seated EOB and 5 minutes after UE strength and ROM assessment, pt able to move digits freely in and out of flexion/extension as well as wrist; recommend continued OT intervention and outpatient OT/PT on d/c    Goals   Short Term Goal  pt will tolerate A/PROM and stretching of B hands/wrists    Long Term Goal #1 pt will perform UB/LB bathing and grooming tasks at min (A) level    Long Term Goal #2 pt will perform FM c no device at (I) level with increased endurance    Long Term Goal pt will perform toilet transfers and nina hygiene at (I) level    Plan   Treatment Interventions ADL retraining;Functional transfer training;UE strengthening/ROM; Endurance training;Patient/family training; Activityengagement; Fine motor coordination activities; Compensatory technique education   Goal Expiration Date 07/25/18   OT Frequency 3-5x/wk   Recommendation   OT Discharge Recommendation Outpatient OT   OT - OK to Discharge No   Barthel Index   Feeding 5   Bathing 0   Grooming Score 0   Dressing Score 5   Bladder Score 10   Bowels Score 10   Toilet Use Score 5   Transfers (Bed/Chair) Score 10   Mobility (Level Surface) Score 10   Stairs Score 0   Barthel Index Score 55       Pt will benefit from continued OT services in order to maximize (I) c ADL performance, FM c no device, and improve overall endurance/strength required to complete functional tasks in preparation for d/c  Pt left seated in chair at end of session; all needs within reach; all lines intact; scds connected and turned on

## 2018-07-11 NOTE — PLAN OF CARE
Problem: DISCHARGE PLANNING - CARE MANAGEMENT  Goal: Discharge to post-acute care or home with appropriate resources  INTERVENTIONS:  - Conduct assessment to determine patient/family and health care team treatment goals, and need for post-acute services based on payer coverage, community resources, and patient preferences, and barriers to discharge  - Address psychosocial, clinical, and financial barriers to discharge as identified in assessment in conjunction with the patient/family and health care team  - Arrange appropriate level of post-acute services according to patient's   needs and preference and payer coverage in collaboration with the physician and health care team  - Communicate with and update the patient/family, physician, and health care team regarding progress on the discharge plan  - Arrange appropriate transportation to post-acute venues  Outcome: Progressing  -outpatient follow up after dc  -possible need for OP therapy after dc

## 2018-07-11 NOTE — ASSESSMENT & PLAN NOTE
-patient was profoundly hypokalemic with potassium of 1 8 during last hospital stay   -potassium noted to be 3 4 today   -Po potassium given  -repeat labs in am   -continue to monitor

## 2018-07-12 ENCOUNTER — HOSPITAL ENCOUNTER (INPATIENT)
Facility: HOSPITAL | Age: 33
LOS: 3 days | Discharge: HOME | DRG: 861 | End: 2018-07-15
Attending: FAMILY MEDICINE | Admitting: FAMILY MEDICINE
Payer: COMMERCIAL

## 2018-07-12 ENCOUNTER — TELEPHONE (OUTPATIENT)
Dept: INTERVENTIONAL RADIOLOGY/VASCULAR | Facility: HOSPITAL | Age: 33
End: 2018-07-12

## 2018-07-12 VITALS
WEIGHT: 169.53 LBS | SYSTOLIC BLOOD PRESSURE: 99 MMHG | OXYGEN SATURATION: 100 % | TEMPERATURE: 97.9 F | BODY MASS INDEX: 30.04 KG/M2 | HEIGHT: 63 IN | DIASTOLIC BLOOD PRESSURE: 55 MMHG | HEART RATE: 96 BPM | RESPIRATION RATE: 18 BRPM

## 2018-07-12 DIAGNOSIS — E87.6 HYPOKALEMIA: ICD-10-CM

## 2018-07-12 DIAGNOSIS — R29.898 WEAKNESS OF BOTH LOWER EXTREMITIES: ICD-10-CM

## 2018-07-12 DIAGNOSIS — Z98.84 HISTORY OF GASTRIC BYPASS: ICD-10-CM

## 2018-07-12 DIAGNOSIS — G43.009 MIGRAINE WITHOUT AURA AND WITHOUT STATUS MIGRAINOSUS, NOT INTRACTABLE: ICD-10-CM

## 2018-07-12 DIAGNOSIS — R29.898 WEAKNESS OF BOTH UPPER EXTREMITIES: ICD-10-CM

## 2018-07-12 DIAGNOSIS — R53.1 LEFT-SIDED WEAKNESS: Primary | ICD-10-CM

## 2018-07-12 DIAGNOSIS — E83.51 HYPOCALCEMIA: ICD-10-CM

## 2018-07-12 LAB
ALBUMIN SERPL BCP-MCNC: 2.2 G/DL (ref 3.5–5)
ALDOLASE SERPL-CCNC: 11 U/L (ref 3.3–10.3)
ALP SERPL-CCNC: 32 U/L (ref 46–116)
ALT SERPL W P-5'-P-CCNC: 29 U/L (ref 12–78)
ANION GAP SERPL CALCULATED.3IONS-SCNC: 10 MMOL/L (ref 4–13)
ANISOCYTOSIS BLD QL SMEAR: PRESENT
AST SERPL W P-5'-P-CCNC: 29 U/L (ref 5–45)
B BURGDOR IGG SER IA-ACNC: 0.06
B BURGDOR IGM SER IA-ACNC: 0.42
BASOPHILS # BLD MANUAL: 0 THOUSAND/UL (ref 0–0.1)
BASOPHILS NFR MAR MANUAL: 0 % (ref 0–1)
BILIRUB SERPL-MCNC: 0.2 MG/DL (ref 0.2–1)
BUN SERPL-MCNC: 8 MG/DL (ref 5–25)
CA-I BLD-SCNC: 1.14 MMOL/L (ref 1.12–1.32)
CALCIUM SERPL-MCNC: 7.8 MG/DL (ref 8.3–10.1)
CHLORIDE SERPL-SCNC: 111 MMOL/L (ref 100–108)
CK MB SERPL-MCNC: 1.1 NG/ML (ref 0–5)
CK MB SERPL-MCNC: <1 % (ref 0–2.5)
CK SERPL-CCNC: 281 U/L (ref 26–192)
CO2 SERPL-SCNC: 21 MMOL/L (ref 21–32)
CREAT SERPL-MCNC: 0.65 MG/DL (ref 0.6–1.3)
EOSINOPHIL # BLD MANUAL: 0.13 THOUSAND/UL (ref 0–0.4)
EOSINOPHIL NFR BLD MANUAL: 2 % (ref 0–6)
ERYTHROCYTE [DISTWIDTH] IN BLOOD BY AUTOMATED COUNT: 15.1 % (ref 11.6–15.1)
GFR SERPL CREATININE-BSD FRML MDRD: 116 ML/MIN/1.73SQ M
GLUCOSE SERPL-MCNC: 72 MG/DL (ref 65–140)
HCT VFR BLD AUTO: 32.3 % (ref 34.8–46.1)
HGB BLD-MCNC: 10.3 G/DL (ref 11.5–15.4)
HIV 1+2 AB+HIV1 P24 AG SERPL QL IA: NORMAL
HIV1 P24 AG SER QL: NORMAL
INR PPP: 1.05 (ref 0.86–1.17)
LDH SERPL-CCNC: 188 U/L (ref 81–234)
LYMPHOCYTES # BLD AUTO: 2.27 THOUSAND/UL (ref 0.6–4.47)
LYMPHOCYTES # BLD AUTO: 36 % (ref 14–44)
MAGNESIUM SERPL-MCNC: 2 MG/DL (ref 1.6–2.6)
MCH RBC QN AUTO: 30.3 PG (ref 26.8–34.3)
MCHC RBC AUTO-ENTMCNC: 31.9 G/DL (ref 31.4–37.4)
MCV RBC AUTO: 95 FL (ref 82–98)
MONOCYTES # BLD AUTO: 0.38 THOUSAND/UL (ref 0–1.22)
MONOCYTES NFR BLD: 6 % (ref 4–12)
NEUTROPHILS # BLD MANUAL: 3.22 THOUSAND/UL (ref 1.85–7.62)
NEUTS SEG NFR BLD AUTO: 51 % (ref 43–75)
PHOSPHATE SERPL-MCNC: 3.5 MG/DL (ref 2.7–4.5)
PLATELET # BLD AUTO: 205 THOUSANDS/UL (ref 149–390)
PLATELET BLD QL SMEAR: ADEQUATE
PMV BLD AUTO: 11.9 FL (ref 8.9–12.7)
POIKILOCYTOSIS BLD QL SMEAR: PRESENT
POTASSIUM SERPL-SCNC: 3.8 MMOL/L (ref 3.5–5.3)
PROT SERPL-MCNC: 5.2 G/DL (ref 6.4–8.2)
PROTHROMBIN TIME: 13.2 SECONDS (ref 11.8–14.2)
RBC # BLD AUTO: 3.4 MILLION/UL (ref 3.81–5.12)
SODIUM SERPL-SCNC: 142 MMOL/L (ref 136–145)
TOTAL CELLS COUNTED SPEC: 100
VARIANT LYMPHS # BLD AUTO: 5 %
WBC # BLD AUTO: 6.31 THOUSAND/UL (ref 4.31–10.16)

## 2018-07-12 PROCEDURE — 85027 COMPLETE CBC AUTOMATED: CPT | Performed by: INTERNAL MEDICINE

## 2018-07-12 PROCEDURE — 83735 ASSAY OF MAGNESIUM: CPT | Performed by: INTERNAL MEDICINE

## 2018-07-12 PROCEDURE — 83520 IMMUNOASSAY QUANT NOS NONAB: CPT | Performed by: PSYCHIATRY & NEUROLOGY

## 2018-07-12 PROCEDURE — 82330 ASSAY OF CALCIUM: CPT | Performed by: INTERNAL MEDICINE

## 2018-07-12 PROCEDURE — 80307 DRUG TEST PRSMV CHEM ANLYZR: CPT | Performed by: FAMILY MEDICINE

## 2018-07-12 PROCEDURE — 97535 SELF CARE MNGMENT TRAINING: CPT

## 2018-07-12 PROCEDURE — 82550 ASSAY OF CK (CPK): CPT | Performed by: INTERNAL MEDICINE

## 2018-07-12 PROCEDURE — 97530 THERAPEUTIC ACTIVITIES: CPT

## 2018-07-12 PROCEDURE — 85007 BL SMEAR W/DIFF WBC COUNT: CPT | Performed by: INTERNAL MEDICINE

## 2018-07-12 PROCEDURE — 97116 GAIT TRAINING THERAPY: CPT

## 2018-07-12 PROCEDURE — 83615 LACTATE (LD) (LDH) ENZYME: CPT | Performed by: INTERNAL MEDICINE

## 2018-07-12 PROCEDURE — 82553 CREATINE MB FRACTION: CPT | Performed by: INTERNAL MEDICINE

## 2018-07-12 PROCEDURE — 85610 PROTHROMBIN TIME: CPT | Performed by: INTERNAL MEDICINE

## 2018-07-12 PROCEDURE — 84100 ASSAY OF PHOSPHORUS: CPT | Performed by: INTERNAL MEDICINE

## 2018-07-12 PROCEDURE — 80053 COMPREHEN METABOLIC PANEL: CPT | Performed by: INTERNAL MEDICINE

## 2018-07-12 PROCEDURE — 84166 PROTEIN E-PHORESIS/URINE/CSF: CPT | Performed by: PSYCHIATRY & NEUROLOGY

## 2018-07-12 PROCEDURE — 99223 1ST HOSP IP/OBS HIGH 75: CPT | Performed by: NURSE PRACTITIONER

## 2018-07-12 RX ORDER — CHOLECALCIFEROL (VITAMIN D3) 125 MCG
500 CAPSULE ORAL DAILY
Status: CANCELLED | OUTPATIENT
Start: 2018-07-13

## 2018-07-12 RX ORDER — ONDANSETRON 2 MG/ML
4 INJECTION INTRAMUSCULAR; INTRAVENOUS EVERY 6 HOURS PRN
Status: DISCONTINUED | OUTPATIENT
Start: 2018-07-12 | End: 2018-07-15 | Stop reason: HOSPADM

## 2018-07-12 RX ORDER — MECLIZINE HCL 12.5 MG/1
25 TABLET ORAL EVERY 12 HOURS PRN
Status: DISCONTINUED | OUTPATIENT
Start: 2018-07-12 | End: 2018-07-15 | Stop reason: HOSPADM

## 2018-07-12 RX ORDER — AMITRIPTYLINE HYDROCHLORIDE 25 MG/1
25 TABLET, FILM COATED ORAL
Status: CANCELLED | OUTPATIENT
Start: 2018-07-12

## 2018-07-12 RX ORDER — SODIUM CHLORIDE AND POTASSIUM CHLORIDE .9; .15 G/100ML; G/100ML
100 SOLUTION INTRAVENOUS CONTINUOUS
Status: CANCELLED | OUTPATIENT
Start: 2018-07-12

## 2018-07-12 RX ORDER — TOPIRAMATE 25 MG/1
25 TABLET ORAL EVERY 12 HOURS SCHEDULED
Status: CANCELLED | OUTPATIENT
Start: 2018-07-12

## 2018-07-12 RX ORDER — METHOCARBAMOL 500 MG/1
750 TABLET, FILM COATED ORAL EVERY 6 HOURS PRN
Status: CANCELLED | OUTPATIENT
Start: 2018-07-12

## 2018-07-12 RX ORDER — TOPIRAMATE 25 MG/1
25 TABLET ORAL EVERY 12 HOURS SCHEDULED
Status: DISCONTINUED | OUTPATIENT
Start: 2018-07-12 | End: 2018-07-15 | Stop reason: HOSPADM

## 2018-07-12 RX ORDER — MELATONIN
1000 DAILY
Status: DISCONTINUED | OUTPATIENT
Start: 2018-07-13 | End: 2018-07-15 | Stop reason: HOSPADM

## 2018-07-12 RX ORDER — SODIUM CHLORIDE AND POTASSIUM CHLORIDE .9; .15 G/100ML; G/100ML
75 SOLUTION INTRAVENOUS CONTINUOUS
Status: DISCONTINUED | OUTPATIENT
Start: 2018-07-12 | End: 2018-07-13

## 2018-07-12 RX ORDER — LORAZEPAM 2 MG/ML
1 INJECTION INTRAMUSCULAR ONCE
Status: COMPLETED | OUTPATIENT
Start: 2018-07-13 | End: 2018-07-13

## 2018-07-12 RX ORDER — DOCUSATE SODIUM 100 MG/1
100 CAPSULE, LIQUID FILLED ORAL 2 TIMES DAILY
Status: CANCELLED | OUTPATIENT
Start: 2018-07-12

## 2018-07-12 RX ORDER — AMITRIPTYLINE HYDROCHLORIDE 50 MG/1
25 TABLET, FILM COATED ORAL
Status: DISCONTINUED | OUTPATIENT
Start: 2018-07-12 | End: 2018-07-15 | Stop reason: HOSPADM

## 2018-07-12 RX ORDER — B-COMPLEX WITH VITAMIN C
1 TABLET ORAL 2 TIMES DAILY WITH MEALS
Status: DISCONTINUED | OUTPATIENT
Start: 2018-07-13 | End: 2018-07-15 | Stop reason: HOSPADM

## 2018-07-12 RX ORDER — MELATONIN
1000 DAILY
Status: CANCELLED | OUTPATIENT
Start: 2018-07-13

## 2018-07-12 RX ORDER — ONDANSETRON 2 MG/ML
4 INJECTION INTRAMUSCULAR; INTRAVENOUS EVERY 6 HOURS PRN
Status: CANCELLED | OUTPATIENT
Start: 2018-07-12

## 2018-07-12 RX ORDER — ACETAMINOPHEN 325 MG/1
650 TABLET ORAL EVERY 6 HOURS PRN
Status: DISCONTINUED | OUTPATIENT
Start: 2018-07-12 | End: 2018-07-13

## 2018-07-12 RX ORDER — POTASSIUM CHLORIDE 20 MEQ/1
20 TABLET, EXTENDED RELEASE ORAL DAILY
Status: CANCELLED | OUTPATIENT
Start: 2018-07-13

## 2018-07-12 RX ORDER — ACETAMINOPHEN 325 MG/1
650 TABLET ORAL EVERY 6 HOURS PRN
Status: CANCELLED | OUTPATIENT
Start: 2018-07-12

## 2018-07-12 RX ORDER — B-COMPLEX WITH VITAMIN C
1 TABLET ORAL 2 TIMES DAILY WITH MEALS
Status: CANCELLED | OUTPATIENT
Start: 2018-07-12

## 2018-07-12 RX ORDER — CHOLECALCIFEROL (VITAMIN D3) 125 MCG
500 CAPSULE ORAL DAILY
Status: DISCONTINUED | OUTPATIENT
Start: 2018-07-13 | End: 2018-07-15 | Stop reason: HOSPADM

## 2018-07-12 RX ADMIN — ENOXAPARIN SODIUM 40 MG: 40 INJECTION, SOLUTION INTRAVENOUS; SUBCUTANEOUS at 08:55

## 2018-07-12 RX ADMIN — CALCIUM CARBONATE-VITAMIN D TAB 500 MG-200 UNIT 1 TABLET: 500-200 TAB at 16:12

## 2018-07-12 RX ADMIN — AMITRIPTYLINE HYDROCHLORIDE 25 MG: 50 TABLET, FILM COATED ORAL at 21:43

## 2018-07-12 RX ADMIN — DOCUSATE SODIUM 100 MG: 100 CAPSULE, LIQUID FILLED ORAL at 08:55

## 2018-07-12 RX ADMIN — POTASSIUM CHLORIDE 20 MEQ: 1500 TABLET, EXTENDED RELEASE ORAL at 08:55

## 2018-07-12 RX ADMIN — VITAMIN D, TAB 1000IU (100/BT) 1000 UNITS: 25 TAB at 08:55

## 2018-07-12 RX ADMIN — CYANOCOBALAMIN TAB 500 MCG 500 MCG: 500 TAB at 08:54

## 2018-07-12 RX ADMIN — CALCIUM CARBONATE-VITAMIN D TAB 500 MG-200 UNIT 1 TABLET: 500-200 TAB at 08:54

## 2018-07-12 RX ADMIN — SODIUM CHLORIDE AND POTASSIUM CHLORIDE 75 ML/HR: .9; .15 SOLUTION INTRAVENOUS at 22:41

## 2018-07-12 RX ADMIN — TOPIRAMATE 25 MG: 25 TABLET, FILM COATED ORAL at 21:43

## 2018-07-12 RX ADMIN — SODIUM CHLORIDE AND POTASSIUM CHLORIDE 100 ML/HR: .9; .15 SOLUTION INTRAVENOUS at 16:39

## 2018-07-12 RX ADMIN — TOPIRAMATE 25 MG: 25 TABLET, FILM COATED ORAL at 08:55

## 2018-07-12 RX ADMIN — SODIUM CHLORIDE AND POTASSIUM CHLORIDE 100 ML/HR: .9; .15 SOLUTION INTRAVENOUS at 06:21

## 2018-07-12 NOTE — ASSESSMENT & PLAN NOTE
-MRI cervical spine showed solitary enlargement of a single left superior jugular chain lymph node   -an US soft tissue neck showed prominent bilateral anterior cervical lymph nodes with normal fatty giovanna, likely reactive   -she will need outpatient follow up with her PCP to ensure resolution

## 2018-07-12 NOTE — PLAN OF CARE
Problem: DISCHARGE PLANNING - CARE MANAGEMENT  Goal: Discharge to post-acute care or home with appropriate resources  INTERVENTIONS:  - Conduct assessment to determine patient/family and health care team treatment goals, and need for post-acute services based on payer coverage, community resources, and patient preferences, and barriers to discharge  - Address psychosocial, clinical, and financial barriers to discharge as identified in assessment in conjunction with the patient/family and health care team  - Arrange appropriate level of post-acute services according to patient's   needs and preference and payer coverage in collaboration with the physician and health care team  - Communicate with and update the patient/family, physician, and health care team regarding progress on the discharge plan  - Arrange appropriate transportation to post-acute venues   Outcome: Completed Date Met: 07/12/18  -transfer to [facility]

## 2018-07-12 NOTE — PHYSICAL THERAPY NOTE
PT Treatment Note      07/12/18 1110   Restrictions/Precautions   Other Precautions (Multiple lines;Telemetry; Fall Risk;Pain)   Subjective   Subjective States my L hand is locked up and my knees get weak and buckle when I walk  Bed Mobility   Additional Comments OOB in chair at start and end of PT session   Transfers   Sit to Stand (CGA)   Additional items Armrests   Stand to Sit (CGA)   Additional items Armrests   Stand pivot (CGA)   Additional items Verbal cues   Ambulation/Elevation   Gait Assistance 4  Minimal assist   Additional items Assist x 1   Assistive Device (IV pole)   Distance 42'  last 10 feet knees weak and buckling   Balance   Static Sitting Good   Dynamic Sitting Good   Static Standing Fair   Dynamic Standing Fair   Ambulatory Fair -   Endurance Deficit   Endurance Deficit Yes   Activity Tolerance   Activity Tolerance Patient limited by fatigue  (weakness)   Assessment   Prognosis Good   Problem List Decreased strength;Decreased endurance; Impaired balance;Decreased mobility   Assessment Pt  presents with decreased LE strength, intermittent muscle spasm/hypertonicity L wrist and hand with decreased tolerance for endurance activities and ambulation due to fatigue muscle tremoring and weakness with knee buckling  Limited ambulation tolerance d/t weakness  Pt is in need of continued activity in PT to improve strength balance endurance and mobility with return to (I) LOF  Plan   Treatment/Interventions Functional transfer training;LE strengthening/ROM; Therapeutic exercise; Endurance training;Bed mobility;Gait training   Progress Progressing toward goals   Recommendation   Recommendation Outpatient PT   Pt  OOB with call bell within reach, scd's connected and turned on and alarm on at end of PT session

## 2018-07-12 NOTE — TELEMEDICINE
TeleConsultation - Neurology   Jeremy Miller 28 y o  female MRN: [id number]  Unit/Bed#: 405-01 Encounter: [id number]      REQUIRED DOCUMENTATION:     1  This service was provided via Telemedicine  2  Provider located at [facility] office  3  TeleMed provider: Tin Pino MD   4  Identify all parties in room with patient during tele consult:   pt's nurse  5  After connecting through [place], patient was then informed that this was a Telemedicine visit and that the exam was being conducted confidentially over secure lines  My office door was closed  The following individuals were in the room with me and the patient informed 2 medical students and 1 PA student  Patient acknowledged consent and understanding of privacy and security of the Telemedicine visit, and gave us permission to have the assistant stay in the room in order to assist with the history and to conduct the exam   I informed the patient that I have reviewed their record in Epic and presented the opportunity for them to ask any questions regarding the visit today  The patient agreed to participate  Assessment/Plan   Assessment:  Aby Benites presents with weakness and muscle contractions  At this point ddx is broad but includes inflammatory neuropathy/myopathy such as CIDP  Unlikely to represent inflammatory myositis considering the lack of significant pain and minimal elevation in CPK  Conversion disorder also remains a significant consideration  Plan:  -Suggest comprehensive lab evlauation including SPEP, UPEP, HIV, Anti GM!, Anti ROYAL, ACE etc   She will also need a lumbar puncture including RBC, WBC, Protein, Glucose, Culture, and eval for myelin basic protein and oligoclonal bands   -Ongoing PT/OT  -Will need OP EMG/NCS +/- nerve/muscle biopsy    History of Present Illness     Reason for Consult / Principal Problem: genearlized weakness  Hx and PE limited by: none  HPI: Jeremy Miller is a 28 y o   female who presents with weakness    She she has a prior history significant for gastric bypass surgery  She reports that her symptoms began on or about Memorial Day of 2018  At that time she began to experience sustained muscle of the distal left upper extremity causing sustained clenching of the L hand  The hand would clench in remain usable and  tight for 10-15 minutes at a time  This would occur approximately 3 times per day  She was unable to identify any specific triggers or any Temporal pattern  Subsequently, this past week, the muscle tension transitioned to a numbness  She clarifies that the numbness is a dysesthesia/paresthesia affecting the entire LUE with otherwise intact sensation  This spread to affect the LLE as well as the LUE and there was accompanying weakness  Subsequently, this past week she starting having similar muscle contractions in her RUE  They are not severely painful, just uncomfortable and tight and do seem to respond to muscle relaxers  They have awakened her from sleep  She was admitted on Saturday 7/7/18 at which time she was found to be profoundly hypokalemic with normal magnesium  She had a MRI Brain and stroke evaluation but no evidence of ischemic stroke was found  After repleating her potassium she was eventually discharged but with no real change in her symptoms  At this point she is experiencing significant generalized weakness and came back for re-admission due to a fall at home  She has not prior history of similar symptoms before 5/2018  She denies atrophy or fasciculations  She denies any vision changes, speech or swallowing changes, she also denies bowel or bladder symptoms  Inpatient consult to Neurology  Consult performed by: Shani Caceres ordered by: Jamilah Ontiveros          Review of Systems    Historical Information   History reviewed  No pertinent past medical history    Past Surgical History:   Procedure Laterality Date    ABDOMINAL SURGERY      APPENDECTOMY      CHOLECYSTECTOMY      GASTRIC BYPASS      HYSTERECTOMY       Social History   History   Alcohol Use    Yes     Comment: socially     History   Drug Use No     History   Smoking Status    Never Smoker   Smokeless Tobacco    Never Used     Family History: History reviewed  No pertinent family history  Meds/Allergies   current meds:   Current Facility-Administered Medications   Medication Dose Route Frequency    acetaminophen (TYLENOL) tablet 650 mg  650 mg Oral Q6H PRN    amitriptyline (ELAVIL) tablet 25 mg  25 mg Oral HS    calcium carbonate-vitamin D (OSCAL-D) 500 mg-200 units per tablet 1 tablet  1 tablet Oral BID With Meals    cholecalciferol (VITAMIN D3) tablet 1,000 Units  1,000 Units Oral Daily    cyanocobalamin (VITAMIN B-12) tablet 500 mcg  500 mcg Oral Daily    docusate sodium (COLACE) capsule 100 mg  100 mg Oral BID    enoxaparin (LOVENOX) subcutaneous injection 40 mg  40 mg Subcutaneous Daily    methocarbamol (ROBAXIN) tablet 750 mg  750 mg Oral Q6H PRN    ondansetron (ZOFRAN) injection 4 mg  4 mg Intravenous Q6H PRN    potassium chloride (K-DUR,KLOR-CON) CR tablet 20 mEq  20 mEq Oral Daily    sodium chloride 0 9 % with KCl 20 mEq/L infusion (premix)  100 mL/hr Intravenous Continuous    topiramate (TOPAMAX) tablet 25 mg  25 mg Oral Q12H MIKE       No Known Allergies    Objective   Vitals:Blood pressure 100/65, pulse 85, temperature 98 °F (36 7 °C), temperature source Temporal, resp  rate 18, height 5' 3" (1 6 m), weight 76 9 kg (169 lb 8 5 oz), SpO2 98 %  ,Body mass index is 30 03 kg/m²  Intake/Output Summary (Last 24 hours) at 07/11/18 2342  Last data filed at 07/11/18 2113   Gross per 24 hour   Intake             1760 ml   Output             1250 ml   Net              510 ml       Invasive Devices:    Invasive Devices     Peripheral Intravenous Line            Peripheral IV 07/10/18 Right Antecubital 1 day                Physical Exam  Neurologic Exam     On exam she was awake, alert, and interactive  She exhibited roberto indefference  She was not hypophonic and her speech was not bradykinetic, however she was quite monotoned  There were no obvious cranial neuropathies, no nystagmus  Strength was diminished but symmetric in the bilateral upper and lower extremities with no tremor or fasiculation  Her movements were quite slow and metered  She had a shuffling gait requiring assistance  She did not have foot drop per se but did not have good ground clearance and complained of some knee buckling        Lab Results:   CBC:   Results from last 7 days  Lab Units 07/11/18  0544 07/10/18  2058 07/10/18  0542   WBC Thousand/uL 6 72 7 65 5 95   RBC Million/uL 3 24* 3 66* 3 28*   HEMOGLOBIN g/dL 9 9* 11 0* 10 0*   HEMATOCRIT % 30 3* 34 1* 30 6*   MCV fL 94 93 93   PLATELETS Thousands/uL 234 262 241   , BMP/CMP:   Results from last 7 days  Lab Units 07/11/18 0544 07/10/18  2155 07/10/18  2058 07/10/18  0542   SODIUM mmol/L 142  --  141 142   POTASSIUM mmol/L 3 4*  --  4 0 3 7   CHLORIDE mmol/L 110*  --  109* 110*   CO2 mmol/L 24  --  25 24   ANION GAP mmol/L 8  --  7 8   BUN mg/dL 8  --  9 8   CREATININE mg/dL 0 74  --  0 69 0 72   GLUCOSE RANDOM mg/dL 95  --  82 78   CALCIUM mg/dL 7 6*  --  7 8* 7 2*   AST U/L 32 42  --  29   ALT U/L 35 40  --  34   ALK PHOS U/L 34* 37*  --  34*   TOTAL PROTEIN g/dL 5 0* 5 7*  --  4 7*   BILIRUBIN TOTAL mg/dL 0 20 0 20  --  0 20   EGFR ml/min/1 73sq m 106  --  114 110   , HgBA1C:   , Coagulation:   Results from last 7 days  Lab Units 07/10/18  2058   INR  0 95     Imaging Studies: I have personally reviewed pertinent films in PACS    Code Status: Level 1 - Full Code  Advance Directive and Living Will:      Power of :    POLST:      Counseling / Coordination of Care  Total encounter time 45 minutes

## 2018-07-12 NOTE — NURSING NOTE
Patient left unit  Vitals stable and in no acute distress  Report called and given to Nyasia Howard RN at receiving facility

## 2018-07-12 NOTE — DISCHARGE SUMMARY
Discharge- Rick Jasso 1985, 28 y o  female MRN: [id number]    Unit/Bed#: 405-01 Encounter: [id number]    Primary Care Provider: Concetta Cruz MD   Date and time admitted to hospital: 7/10/2018  8:12 PM        * Weakness of both lower extremities   Assessment & Plan    The patient presented to the ER due to bilateral lower extremity weakness and reported 3 falls at home  [facility] from 7/7/18 to 7/10/18 due the same complaint with findings of severe hypokalemia of 1 8  The patient's electrolytes were corrected and CVA workup was negative  The patient was discharged home on 7/10/18 and returned to the ER due to 3 falls at home  MRI cervical spine was negative for acute findings  Neurology was consulted and recommended a lumbar puncture with opening pressures and lab work  The patient was transferred to [facility] for continued up of her symptomatolgy with a LP as well as to be directly evaluated and followed by Neurology  The patient's case was discussed with Hector Macario who accepted the patient onto the [facility] service  Weakness of both upper extremities   Assessment & Plan     See plan above        Elevated CPK   Assessment & Plan    -the patient was noted to have an elevated CPK of 968 on 7/7/18 this has been trending down and is noted to be 281 today   -Continue IV fluids   -continue to monitor  -ANA LILIA, aldolase level and lyme profile are pending  -ESR and CRP are normal         Hypokalemia   Assessment & Plan    -patient was profoundly hypokalemic with potassium of 1 8 during last hospital stay   -potassium noted to be 3 8 today   -continue to monitor  Vitamin B12 deficiency   Assessment & Plan    -methylmalonic acid level pending   - continue cyanocobalamin 500 mcg PO daily          Vitamin D deficiency   Assessment & Plan     Continue vitamin-D supplement        History of gastric bypass   Assessment & Plan    - outpatient follow up with her gastric bypass surgeon  Cervical lymphadenopathy   Assessment & Plan    -MRI cervical spine showed solitary enlargement of a single left superior jugular chain lymph node   -an US soft tissue neck showed prominent bilateral anterior cervical lymph nodes with normal fatty giovanna, likely reactive   -she will need outpatient follow up with her PCP to ensure resolution  Discharging Physician / Practitioner: Edgard Pepper PA-C  PCP: Diana Mccollum MD  Admission Date:   Admission Orders     Ordered        07/11/18 1443  Inpatient Admission  Once         07/10/18 2335  Place in Observation  Once             Discharge Date: 07/12/18    Resolved Problems  Date Reviewed: 7/12/2018    None          Consultations During Hospital Stay:  · Tele-neurology    Procedures Performed:     · CT cervical spine: No cervical spine fracture or traumatic malalignment  · CT head: No acute intracranial abnormality  · Chest x-ray: No active pulmonary disease  · MRI cervical spine: Normal appearance of the cervical spinal cord   No cord compression or cord signal abnormality   No significant central or foraminal narrowing  Solitary enlargement of a single left superior jugular chain lymph node  · US soft tissue neck: Prominent bilateral anterior cervical lymph nodes with normal fatty giovanna, likely reactive  Significant Findings / Test Results:     · potassium 3 4,     Incidental Findings:   · none     Complications:  none    Reason for Admission: bilateral lower extremity weakness     Hospital Course: Blanye Hayes is a 28 y o  female patient who originally presented to the hospital on 7/10/2018 due to two separate falls since being discharged from the hospital earlier in the day 7/10/2018       Patient reports 2 separate falls, she states that both of her legs felt like they "gave out", her legs buckled    Patient did not lose consciousness, 3rd event was witnessed by her , she did not fall because he caught her  Again there was no loss of consciousness, patient denies any specific pain, she has no neck or back pain, has a moderate to severe bifrontal headache due to striking her head after the 2nd fall        Patient denies any history of fever/chills, no rashes or skin lesions, no diarrhea, no dysuria or increased urinary frequency  No reported cough, no recent travel, no known exposure to any ticks        Patient has no abdominal pain, she has no muscle pain, patient was recently admitted, received IV fluid she does note that she feels somewhat "puffy"  Patient reports ongoing paresthesias in her left upper extremity for over 1 month, patient states that her lower extremity symptoms specifically the weakness have only been occurring for less than 1 week  Denies any previous history of the same        Patient does have a history of gastric bypass, was followed regularly, reports normal blood work in January of 2018, has since been weaned off of all supplements, patient reports good p o  Intake, no anorexia, no nausea vomiting or diarrhea  Please see above list of diagnoses and related plan for additional information  Condition at Discharge: stable     Discharge Day Visit / Exam:     * Please refer to separate progress note for these details *    Discussion with Family: n/a    Discharge instructions/Information to patient and family:   See after visit summary for information provided to patient and family  Provisions for Follow-Up Care:  See after visit summary for information related to follow-up care and any pertinent home health orders  Disposition:     [address] Transfer to [facility]  For Discharges to [facility] SNF:   · Not Applicable to this Patient - Not Applicable to this Patient    Planned Readmission: yes     Discharge Statement:  I spent 25 minutes discharging the patient  This time was spent on the day of discharge   I had direct contact with the patient on the day of discharge  Greater than 50% of the total time was spent examining patient, answering all patient questions, arranging and discussing plan of care with patient as well as directly providing post-discharge instructions  Additional time then spent on discharge activities  Discharge Medications:  See after visit summary for reconciled discharge medications provided to patient and family        ** Please Note: This note has been constructed using a voice recognition system **

## 2018-07-12 NOTE — ASSESSMENT & PLAN NOTE
-the patient was noted to have an elevated CPK of 968 on 7/7/18 this has been trending down and is noted to be 281 today   -Continue IV fluids   -continue to monitor  -ANA LILIA, aldolase level and lyme profile are pending    -ESR and CRP are normal

## 2018-07-12 NOTE — ASSESSMENT & PLAN NOTE
-patient was profoundly hypokalemic with potassium of 1 8 during last hospital stay   -potassium noted to be 3 8 today   -continue to monitor

## 2018-07-13 ENCOUNTER — APPOINTMENT (INPATIENT)
Dept: RADIOLOGY | Facility: HOSPITAL | Age: 33
DRG: 861 | End: 2018-07-13
Payer: COMMERCIAL

## 2018-07-13 LAB
ACE SERPL-CCNC: 34 U/L (ref 14–82)
ALBUMIN SERPL ELPH-MCNC: 3.17 G/DL (ref 3.5–5)
ALBUMIN SERPL ELPH-MCNC: 55.6 % (ref 52–65)
ALBUMIN UR ELPH-MCNC: 100 %
ALPHA1 GLOB MFR UR ELPH: 0 %
ALPHA1 GLOB SERPL ELPH-MCNC: 0.35 G/DL (ref 0.1–0.4)
ALPHA1 GLOB SERPL ELPH-MCNC: 6.2 % (ref 2.5–5)
ALPHA2 GLOB MFR UR ELPH: 0 %
ALPHA2 GLOB SERPL ELPH-MCNC: 0.76 G/DL (ref 0.4–1.2)
ALPHA2 GLOB SERPL ELPH-MCNC: 13.4 % (ref 7–13)
AMPHETAMINES SERPL QL SCN: NEGATIVE
ANION GAP SERPL CALCULATED.3IONS-SCNC: 6 MMOL/L (ref 4–13)
APPEARANCE CSF: CLEAR
B-GLOBULIN MFR UR ELPH: 0 %
BARBITURATES UR QL: NEGATIVE
BASOPHILS # BLD AUTO: 0.03 THOUSANDS/ΜL (ref 0–0.1)
BASOPHILS NFR BLD AUTO: 1 % (ref 0–1)
BASOPHILS NFR CSF MANUAL: 0 %
BENZODIAZ UR QL: NEGATIVE
BETA GLOB ABNORMAL SERPL ELPH-MCNC: 0.39 G/DL (ref 0.4–0.8)
BETA1 GLOB SERPL ELPH-MCNC: 6.9 % (ref 5–13)
BETA2 GLOB SERPL ELPH-MCNC: 5.1 % (ref 2–8)
BETA2+GAMMA GLOB SERPL ELPH-MCNC: 0.29 G/DL (ref 0.2–0.5)
BLASTS CSF MANUAL: 0 %
BUN SERPL-MCNC: 7 MG/DL (ref 5–25)
C GATTII+NEOFOR DNA CSF QL NAA+NON-PROBE: NOT DETECTED
CALCIUM SERPL-MCNC: 8 MG/DL (ref 8.3–10.1)
CHLORIDE SERPL-SCNC: 112 MMOL/L (ref 100–108)
CMV DNA CSF QL NAA+NON-PROBE: NOT DETECTED
CO2 SERPL-SCNC: 23 MMOL/L (ref 21–32)
COCAINE UR QL: NEGATIVE
CREAT SERPL-MCNC: 0.72 MG/DL (ref 0.6–1.3)
E COLI K1 DNA CSF QL NAA+NON-PROBE: NOT DETECTED
EOSINOPHIL # BLD AUTO: 0.29 THOUSAND/ΜL (ref 0–0.61)
EOSINOPHIL NFR BLD AUTO: 5 % (ref 0–6)
EOSINOPHIL NFR CSF MANUAL: 0 %
EPENDYMAL CELLS NFR CSF: 0 %
ERYTHROCYTE [DISTWIDTH] IN BLOOD BY AUTOMATED COUNT: 15.4 % (ref 11.6–15.1)
EV RNA CSF QL NAA+NON-PROBE: NOT DETECTED
GAMMA GLOB ABNORMAL SERPL ELPH-MCNC: 0.73 G/DL (ref 0.5–1.6)
GAMMA GLOB MFR UR ELPH: 0 %
GAMMA GLOB SERPL ELPH-MCNC: 12.8 % (ref 12–22)
GFR SERPL CREATININE-BSD FRML MDRD: 110 ML/MIN/1.73SQ M
GLUCOSE CSF-MCNC: 51 MG/DL (ref 50–80)
GLUCOSE SERPL-MCNC: 70 MG/DL (ref 65–140)
GP B STREP DNA CSF QL NAA+NON-PROBE: NOT DETECTED
GRAM STN SPEC: NORMAL
GRAM STN SPEC: NORMAL
HAEM INFLU DNA CSF QL NAA+NON-PROBE: NOT DETECTED
HCT VFR BLD AUTO: 35.6 % (ref 34.8–46.1)
HGB BLD-MCNC: 11.1 G/DL (ref 11.5–15.4)
HHV6 DNA CSF QL NAA+NON-PROBE: NOT DETECTED
HISTIOCYTES NFR CSF: 0 %
HSV1 DNA CSF QL NAA+NON-PROBE: NOT DETECTED
HSV2 DNA CSF QL NAA+NON-PROBE: NOT DETECTED
IGG/ALB SER: 1.25 {RATIO} (ref 1.1–1.8)
L MONOCYTOG DNA CSF QL NAA+NON-PROBE: NOT DETECTED
LYMPHOCYTES # BLD AUTO: 2.52 THOUSANDS/ΜL (ref 0.6–4.47)
LYMPHOCYTES NFR BLD AUTO: 40 % (ref 14–44)
LYMPHOCYTES NFR CSF MANUAL: 98 %
Lab: 0 %
MAGNESIUM SERPL-MCNC: 1.9 MG/DL (ref 1.6–2.6)
MCH RBC QN AUTO: 29.8 PG (ref 26.8–34.3)
MCHC RBC AUTO-ENTMCNC: 31.2 G/DL (ref 31.4–37.4)
MCV RBC AUTO: 95 FL (ref 82–98)
METHADONE UR QL: NEGATIVE
METHYLMALONATE SERPL-SCNC: 275 NMOL/L (ref 0–378)
MONOCYTES # BLD AUTO: 0.45 THOUSAND/ΜL (ref 0.17–1.22)
MONOCYTES NFR BLD AUTO: 7 % (ref 4–12)
MONONUC CELLS NFR CSF MANUAL: 0 %
MONOS+MACROS CSF MANUAL: 1 %
N MEN DNA CSF QL NAA+NON-PROBE: NOT DETECTED
NEUTROPHILS # BLD AUTO: 3.08 THOUSANDS/ΜL (ref 1.85–7.62)
NEUTROPHILS NFR CSF MANUAL: 1 %
NEUTS BAND NFR CSF MANUAL: 0 %
NEUTS SEG NFR BLD AUTO: 48 % (ref 43–75)
NRBC BLD AUTO-RTO: 0 /100 WBCS
OPIATES UR QL SCN: NEGATIVE
OTHER CELLS FLD MANUAL: 0 %
PARECHOVIRUS A RNA CSF QL NAA+NON-PROBE: NOT DETECTED
PCP UR QL: NEGATIVE
PLATELET # BLD AUTO: 246 THOUSANDS/UL (ref 149–390)
PMV BLD AUTO: 11.1 FL (ref 8.9–12.7)
POTASSIUM SERPL-SCNC: 4.1 MMOL/L (ref 3.5–5.3)
PROT CSF-MCNC: 28 MG/DL (ref 15–45)
PROT PATTERN SERPL ELPH-IMP: ABNORMAL
PROT PATTERN UR ELPH-IMP: NORMAL
PROT SERPL-MCNC: 5.7 G/DL (ref 6.4–8.2)
PROT UR-MCNC: 16 MG/DL
RBC # BLD AUTO: 3.73 MILLION/UL (ref 3.81–5.12)
RBC # CSF MANUAL: 2 UL (ref 0–10)
RYE IGE QN: NEGATIVE
S PNEUM DNA CSF QL NAA+NON-PROBE: NOT DETECTED
SL AMB DISCLAIMER: NORMAL
SODIUM SERPL-SCNC: 141 MMOL/L (ref 136–145)
THC UR QL: NEGATIVE
TOTAL CELLS COUNTED BLD: NO
TOTAL CELLS COUNTED SPEC: 100
TUBE # CSF: 4
VZV DNA CSF QL NAA+NON-PROBE: NOT DETECTED
WBC # BLD AUTO: 6.37 THOUSAND/UL (ref 4.31–10.16)
WBC # CSF AUTO: 1 /UL (ref 0–5)

## 2018-07-13 PROCEDURE — 87498 ENTEROVIRUS PROBE&REVRS TRNS: CPT | Performed by: PHYSICIAN ASSISTANT

## 2018-07-13 PROCEDURE — 99233 SBSQ HOSP IP/OBS HIGH 50: CPT | Performed by: PSYCHIATRY & NEUROLOGY

## 2018-07-13 PROCEDURE — 62270 DX LMBR SPI PNXR: CPT | Performed by: RADIOLOGY

## 2018-07-13 PROCEDURE — 83873 ASSAY OF CSF PROTEIN: CPT | Performed by: PHYSICIAN ASSISTANT

## 2018-07-13 PROCEDURE — 77003 FLUOROGUIDE FOR SPINE INJECT: CPT

## 2018-07-13 PROCEDURE — 89050 BODY FLUID CELL COUNT: CPT | Performed by: PHYSICIAN ASSISTANT

## 2018-07-13 PROCEDURE — 87529 HSV DNA AMP PROBE: CPT | Performed by: PHYSICIAN ASSISTANT

## 2018-07-13 PROCEDURE — 87532 HHV-6 DNA AMP PROBE: CPT | Performed by: PHYSICIAN ASSISTANT

## 2018-07-13 PROCEDURE — 83735 ASSAY OF MAGNESIUM: CPT | Performed by: NURSE PRACTITIONER

## 2018-07-13 PROCEDURE — 80048 BASIC METABOLIC PNL TOTAL CA: CPT | Performed by: NURSE PRACTITIONER

## 2018-07-13 PROCEDURE — 89051 BODY FLUID CELL COUNT: CPT | Performed by: PHYSICIAN ASSISTANT

## 2018-07-13 PROCEDURE — 009U3ZX DRAINAGE OF SPINAL CANAL, PERCUTANEOUS APPROACH, DIAGNOSTIC: ICD-10-PCS | Performed by: FAMILY MEDICINE

## 2018-07-13 PROCEDURE — 87653 STREP B DNA AMP PROBE: CPT | Performed by: PHYSICIAN ASSISTANT

## 2018-07-13 PROCEDURE — 83916 OLIGOCLONAL BANDS: CPT | Performed by: PHYSICIAN ASSISTANT

## 2018-07-13 PROCEDURE — 99232 SBSQ HOSP IP/OBS MODERATE 35: CPT | Performed by: FAMILY MEDICINE

## 2018-07-13 PROCEDURE — 87496 CYTOMEG DNA AMP PROBE: CPT | Performed by: PHYSICIAN ASSISTANT

## 2018-07-13 PROCEDURE — 87798 DETECT AGENT NOS DNA AMP: CPT | Performed by: PHYSICIAN ASSISTANT

## 2018-07-13 PROCEDURE — 85025 COMPLETE CBC W/AUTO DIFF WBC: CPT | Performed by: NURSE PRACTITIONER

## 2018-07-13 PROCEDURE — 82945 GLUCOSE OTHER FLUID: CPT | Performed by: PHYSICIAN ASSISTANT

## 2018-07-13 PROCEDURE — 77003 FLUOROGUIDE FOR SPINE INJECT: CPT | Performed by: RADIOLOGY

## 2018-07-13 PROCEDURE — 87070 CULTURE OTHR SPECIMN AEROBIC: CPT | Performed by: PHYSICIAN ASSISTANT

## 2018-07-13 PROCEDURE — 84157 ASSAY OF PROTEIN OTHER: CPT | Performed by: PHYSICIAN ASSISTANT

## 2018-07-13 PROCEDURE — 62270 DX LMBR SPI PNXR: CPT

## 2018-07-13 RX ORDER — ACETAMINOPHEN 325 MG/1
650 TABLET ORAL EVERY 6 HOURS PRN
Status: DISCONTINUED | OUTPATIENT
Start: 2018-07-13 | End: 2018-07-14

## 2018-07-13 RX ADMIN — TOPIRAMATE 25 MG: 25 TABLET, FILM COATED ORAL at 08:36

## 2018-07-13 RX ADMIN — ACETAMINOPHEN 650 MG: 325 TABLET, FILM COATED ORAL at 21:42

## 2018-07-13 RX ADMIN — SODIUM CHLORIDE AND POTASSIUM CHLORIDE 75 ML/HR: .9; .15 SOLUTION INTRAVENOUS at 08:36

## 2018-07-13 RX ADMIN — VITAMIN D, TAB 1000IU (100/BT) 1000 UNITS: 25 TAB at 08:36

## 2018-07-13 RX ADMIN — AMITRIPTYLINE HYDROCHLORIDE 25 MG: 50 TABLET, FILM COATED ORAL at 21:39

## 2018-07-13 RX ADMIN — ACETAMINOPHEN 650 MG: 325 TABLET, FILM COATED ORAL at 14:50

## 2018-07-13 RX ADMIN — CALCIUM CARBONATE 500 MG (1,250 MG)-VITAMIN D3 200 UNIT TABLET 1 TABLET: at 08:36

## 2018-07-13 RX ADMIN — CALCIUM CARBONATE 500 MG (1,250 MG)-VITAMIN D3 200 UNIT TABLET 1 TABLET: at 17:11

## 2018-07-13 RX ADMIN — ZINC 1 TABLET: TAB ORAL at 17:11

## 2018-07-13 RX ADMIN — CYANOCOBALAMIN TAB 500 MCG 500 MCG: 500 TAB at 08:36

## 2018-07-13 RX ADMIN — TOPIRAMATE 25 MG: 25 TABLET, FILM COATED ORAL at 21:40

## 2018-07-13 RX ADMIN — LORAZEPAM 1 MG: 2 INJECTION INTRAMUSCULAR; INTRAVENOUS at 08:36

## 2018-07-13 NOTE — ASSESSMENT & PLAN NOTE
· S/p Roshan En Y Gastric bypass 10/2014  · Likely causative factor of electrolyte abnormalities  · Outpatient follow-up with gastric bypass surgeon

## 2018-07-13 NOTE — ASSESSMENT & PLAN NOTE
· Seen by neurology, Dr Ambriz , plan for comprehensive lab evaluation including SPEP, UPEP, HIV, Anti GM, Anti ROYAL, ACE etc  Lumbar puncture including RBC, WBC, Protein, Glucose, Culture, and eval for myelin basic protein and oligoclonal bands  · MRI: neg for acute intracranial pathology  · Continue PT OT  · Recommended outpatient EMG NCS study and possible nerve muscle biopsy  · Neurology consult

## 2018-07-13 NOTE — ASSESSMENT & PLAN NOTE
· Mostly manifested in bilateral upper extremity weakness of b/l   · Appreciate Neurology input  · Uncertain etiology with proposed DD by Neurology including inflammatory neuropathy/myopathy such as CIDP    Conversion disorder remains a possibility  · Had lumbar puncture today 7/13 - per IR report normal appearing CSF and OP of 14; RBC, WBC, Protein, Glucose, Culture, and eval for myelin basic protein and oligoclonal bands pending  · MRI: neg for acute intracranial pathology  · Continue PT OT  · Recommended outpatient EMG NCS study and possible nerve muscle biopsy

## 2018-07-13 NOTE — NURSING NOTE
Pt reported L hand was contracted at [phone], She stated "it feels like my hand is asleep, I can not open my fist"  Pt regained use of her hand at [phone]  Pt stated this happened multiple times last night and it sometimes is the left hand and sometimes is the right  Will continue to closely monitor  Call bell within reach

## 2018-07-13 NOTE — CONSULTS
Consulted for diet induced electrolyte imbalance  Pt reported concerns w/ recent hand contraction as well weakness and fall  Pt reported gastric bypass in October 2014, has been successful w/ wt loss  Per pt follows w/ [facility], hasn't been taking vitamin supplements x 2 years reason being that she felt she was consuming well balanced diet and f/u for blood work was wnl and was told it was ok to not take supplements  Pt reported following healthy diet, B: egg/wheat toast or greek yogurt and fresh fruit, snacks in between meals on nuts, string cheese, greek yogurt, frozen cystal light pops, fruit L: spinach or roxana salad w/ chicken, hard boiled eggs, D: chicken/pork, sweet potatoes or brown rice, roasted vegetables  Pt reported has had recent wt loss d/t traveling, increased activity,  packing/or making food during vacation rather than eating out, has had increased intake of fruit and veggies  Reviewed lab hx: 7/9 B12 wnl, vit D 10 6 low, folate wnl, ionized Ca 1 08, K low  Current labs:  K, Mg, P, Na, ionized Ca wnl    Pt appears to be in compliance w/diet based on diet hx  Reviewed foods that contain, calcium, vit D, B-12, and K, all of which pt appears to consume a good amount of  Encouraged pt to continue consuming balanced meals, with lean protein, whole grains, fruits, veggies, but also stressed the importance of taking vitamin supplements regularly as pt w/ gastric bypass

## 2018-07-13 NOTE — ASSESSMENT & PLAN NOTE
· K 3 8  · On last admission 7/07/18, patient found to be severely hypokalemic, K 1 8, K continued to drop, pt started on IVF 0 9NS w K 20mEq  Given Rx for Kcl 20meq qd   Continue IVF  · Monitor serum potassium  · PMD and Nephrology follow-up as outpatient

## 2018-07-13 NOTE — ASSESSMENT & PLAN NOTE
· S/p Roshan En Y Gastric bypass 10/2014 with resultant malabsorption  · Likely causative factor of electrolyte abnormalities  · Patient needs to be on vitamin replacement  · Outpatient follow-up with gastric bypass surgeon

## 2018-07-13 NOTE — PLAN OF CARE
Problem: DISCHARGE PLANNING - CARE MANAGEMENT  Goal: Discharge to post-acute care or home with appropriate resources  INTERVENTIONS:  - Conduct assessment to determine patient/family and health care team treatment goals, and need for post-acute services based on payer coverage, community resources, and patient preferences, and barriers to discharge  - Address psychosocial, clinical, and financial barriers to discharge as identified in assessment in conjunction with the patient/family and health care team  - Arrange appropriate level of post-acute services according to patients   needs and preference and payer coverage in collaboration with the physician and health care team  - Communicate with and update the patient/family, physician, and health care team regarding progress on the discharge plan  - Arrange appropriate transportation to post-acute venues  Outcome: Adequate for Discharge  Patient to be discharged with appropriate services when medically cleared by MD  No additional needs at present  CM to follow as needed

## 2018-07-13 NOTE — OCCUPATIONAL THERAPY NOTE
Occupational Therapy Cancellation Note:    Patient Name: Paul Salinas  TMUIK'T Date: 7/13/2018     OT consult received  Chart reviewed  Attempted to see pt for OT evaluation, however pt cx'd this PM 2* unavailable off floor @ IR for scheduled LP  OT to follow pt on caseload as able to A w/ safe d/c planning/recommendations       Tika November, OTR/L

## 2018-07-13 NOTE — ASSESSMENT & PLAN NOTE
· Continue Topamax and amitriptyline  · Recommended outpatient follow-up to consider discontinuing Topamax due to side effect of hypokalemia however pt on topamax for a year with no changes made in the dose  Will continue

## 2018-07-13 NOTE — PLAN OF CARE
Problem: Prexisting or High Potential for Compromised Skin Integrity  Goal: Skin integrity is maintained or improved  INTERVENTIONS:  - Identify patients at risk for skin breakdown  - Assess and monitor skin integrity  - Assess and monitor nutrition and hydration status  - Monitor labs (i e  albumin)  - Assess for incontinence   - Turn and reposition patient  - Assist with mobility/ambulation  - Relieve pressure over bony prominences  - Avoid friction and shearing  - Provide appropriate hygiene as needed including keeping skin clean and dry  - Evaluate need for skin moisturizer/barrier cream  - Collaborate with interdisciplinary team (i e  Nutrition, Rehabilitation, etc )   - Patient/family teaching   Outcome: Progressing      Problem: PAIN - ADULT  Goal: Verbalizes/displays adequate comfort level or baseline comfort level  Interventions:  - Encourage patient to monitor pain and request assistance  - Assess pain using appropriate pain scale  - Administer analgesics based on type and severity of pain and evaluate response  - Implement non-pharmacological measures as appropriate and evaluate response  - Consider cultural and social influences on pain and pain management  - Notify physician/advanced practitioner if interventions unsuccessful or patient reports new pain   Outcome: Progressing      Problem: DISCHARGE PLANNING  Goal: Discharge to home or other facility with appropriate resources  INTERVENTIONS:  - Identify barriers to discharge w/patient and caregiver  - Arrange for needed discharge resources and transportation as appropriate  - Identify discharge learning needs (meds, wound care, etc )  - Refer to Case Management Department for coordinating discharge planning if the patient needs post-hospital services based on physician/advanced practitioner order or complex needs related to functional status, cognitive ability, or social support system   Outcome: Progressing      Problem: Knowledge Deficit  Goal: Patient/family/caregiver demonstrates understanding of disease process, treatment plan, medications, and discharge instructions  Complete learning assessment and assess knowledge base  Interventions:  - Provide teaching at level of understanding  - Provide teaching via preferred learning methods   Outcome: Progressing      Problem: NEUROSENSORY - ADULT  Goal: Achieves stable or improved neurological status  INTERVENTIONS  - Monitor and report changes in neurological status  - Initiate measures to prevent increased intracranial pressure  - Maintain blood pressure and fluid volume within ordered parameters to optimize cerebral perfusion  - Monitor temperature, glucose, and sodium or any other associated labs   Initiate appropriate interventions as ordered  - Monitor for seizure activity   - Administer anti-seizure medications as ordered  Outcome: Progressing    Goal: Achieves maximal functionality and self care  INTERVENTIONS  - Monitor swallowing and airway patency with patient fatigue and changes in neurological status  - Encourage and assist patient to increase activity and self care with guidance from rehab services  - Encourage visually impaired, hearing impaired and aphasic patients to use assistive/communication devices  Outcome: Progressing      Problem: METABOLIC, FLUID AND ELECTROLYTES - ADULT  Goal: Electrolytes maintained within normal limits  INTERVENTIONS:  - Monitor labs and assess patient for signs and symptoms of electrolyte imbalances  - Administer electrolyte replacement as ordered  - Monitor response to electrolyte replacements, including repeat lab results as appropriate  - Instruct patient on fluid and nutrition as appropriate  Outcome: Progressing    Goal: Fluid balance maintained  INTERVENTIONS:  - Monitor labs and assess for signs and symptoms of volume excess or deficit  - Monitor I/O and WT  - Instruct patient on fluid and nutrition as appropriate  Outcome: Progressing      Problem: MUSCULOSKELETAL - ADULT  Goal: Maintain or return mobility to safest level of function  INTERVENTIONS:  - Assess patient's ability to carry out ADLs; assess patient's baseline for ADL function and identify physical deficits which impact ability to perform ADLs (bathing, care of mouth/teeth, toileting, grooming, dressing, etc )  - Assess/evaluate cause of self-care deficits   - Assess range of motion  - Assess patient's mobility; develop plan if impaired  - Assess patient's need for assistive devices and provide as appropriate  - Encourage maximum independence but intervene and supervise when necessary  - Involve family in performance of ADLs  - Assess for home care needs following discharge   - Request OT consult to assist with ADL evaluation and planning for discharge  - Provide patient education as appropriate  Outcome: Progressing    Goal: Maintain proper alignment of affected body part  INTERVENTIONS:  - Support, maintain and protect limb and body alignment  - Provide pt/fam with appropriate education  Outcome: Progressing

## 2018-07-13 NOTE — H&P
H&P- Aracelis Correa 1985, 28 y o  female MRN: [id number]    Unit/Bed#: E4 -01 Encounter: [id number]    Primary Care Provider: Boby Gregory MD   Date and time admitted to hospital: 7/12/2018  8:14 PM      * Generalized weakness   Assessment & Plan    · Seen by neurology, Dr Cassandra Velez, plan for comprehensive lab evaluation including SPEP, UPEP, HIV, Anti GM, Anti ROYAL, ACE etc  Lumbar puncture including RBC, WBC, Protein, Glucose, Culture, and eval for myelin basic protein and oligoclonal bands  · MRI: neg for acute intracranial pathology  · Continue PT OT  · Recommended outpatient EMG NCS study and possible nerve muscle biopsy  · Neurology consult         Hypokalemia   Assessment & Plan    · K 3 8  · On last admission 7/07/18, patient found to be severely hypokalemic, K 1 8, K continued to drop, pt started on IVF 0 9NS w K 20mEq  Given Rx for Kcl 20meq qd  Continue IVF  · Monitor serum potassium  · PMD and Nephrology follow-up as outpatient        Vitamin B12 deficiency   Assessment & Plan    · Continue B12 supplement        Vitamin D deficiency   Assessment & Plan    · Continue D supplement        Hypocalcemia   Assessment & Plan    · Continue Ca supplement  · Monitor serum Ca        History of migraine headaches   Assessment & Plan    · Continue Topamax and amitriptyline  · Recommended outpatient follow-up to consider discontinuing Topamax due to side effect of hypokalemia however pt on topamax for a year with no changes made in the dose  Will continue          History of gastric bypass   Assessment & Plan    · S/p Roshan En Y Gastric bypass 10/2014  · Likely causative factor of electrolyte abnormalities  · Outpatient follow-up with gastric bypass surgeon            VTE Prophylaxis: Low risk  / reason for no mechanical VTE prophylaxis Ambulate   Code Status: FC  POLST: POLST is not applicable to this patient  Discussion with family:     Anticipated Length of Stay:  Patient will be admitted on an Inpatient basis with an anticipated length of stay of  > 2 midnights  Justification for Hospital Stay:  Generalized weakness and muscle contractions    Total Time for Visit, including Counseling / Coordination of Care: 45 minutes  Greater than 50% of this total time spent on direct patient counseling and coordination of care  Chief Complaint:   Numbness, tingling, muscle contractions and weakness    History of Present Illness:    Wan Dover is a 28 y o  female who presents with c/o intermittent B/L upper extremity numbness, tingling, weakness and spasms, generalized weakness in all extremities and frequent falls  Symptoms began July 7 2018 with left-sided weakness, patient admitted to [facility] and placed on stroke pathway, workup negative for CVA  Muscle weakness and cramping thought to be secondary to severity of hypokalemia; seen by Nephrology  Treated with Robaxin for upper extremity rigidity  Robaxin eventually discontinued due to frequency of falls  Patient discharged 7/10 and returned later in the evening with same complaint of generalized weakness and mechanical fall  MRI cervical spine ordered, no acute findings, normal imaging of C spine  Neurology consulted, and recommended comprehensive lab evaluation and lumbar puncture  Differential diagnosis includes inflammatory neuropathy/myopathy vs conversion disorder  Patient denies fever, cough, chills, rash, bug bites, SOB, PARNELL, CP, palpitations, loss of appetite, abdominal pain, NVCD or dysuria  Review of Systems:    Review of Systems   Constitutional: Positive for activity change  Negative for appetite change, chills, diaphoresis, fatigue, fever and unexpected weight change  HENT: Negative  Eyes: Negative  Respiratory: Negative  Cardiovascular: Negative  Gastrointestinal: Negative  Genitourinary: Negative  Musculoskeletal: Negative  Skin: Negative  Neurological: Positive for weakness and numbness   Negative for dizziness, tremors, seizures, syncope, facial asymmetry, speech difficulty, light-headedness and headaches  Paresthesias, intermittent muscle contractions of B/L  UE, weakness all extremities   Psychiatric/Behavioral: The patient is nervous/anxious  Past Medical and Surgical History:     No past medical history on file  Past Surgical History:   Procedure Laterality Date    ABDOMINAL SURGERY      APPENDECTOMY      CHOLECYSTECTOMY      GASTRIC BYPASS      HYSTERECTOMY         Meds/Allergies:    Prior to Admission medications    Medication Sig Start Date End Date Taking? Authorizing Provider   amitriptyline (ELAVIL) 100 mg tablet Take 25 mg by mouth daily at bedtime    Historical Provider, MD   calcium carbonate-vitamin D (OSCAL-D) 500 mg-200 units per tablet Take 1 tablet by mouth 2 (two) times a day with meals 7/10/18   [employer], DO   cholecalciferol (VITAMIN D3) 1,000 units tablet Take 1 tablet (1,000 Units total) by mouth daily 7/11/18   [employer], DO   cyanocobalamin 500 MCG tablet Take 1 tablet (500 mcg total) by mouth daily 7/11/18   [employer], DO   meclizine (ANTIVERT) 25 mg tablet Take 1 tablet (25 mg total) by mouth every 12 (twelve) hours as needed for dizziness 7/10/18   [employer], DO   potassium chloride (K-DUR,KLOR-CON) 20 mEq tablet Take 1 tablet (20 mEq total) by mouth daily 7/11/18   [employer], DO   topiramate (TOPAMAX) 50 MG tablet Take 25 mg by mouth every 12 (twelve) hours    Historical Provider, MD   methocarbamol (ROBAXIN) 750 mg tablet Take 1 tablet (750 mg total) by mouth every 6 (six) hours as needed for muscle spasms (PRN hand pain/spasms) 7/10/18 7/12/18  [employer], DO     I have reviewed home medications with patient personally      Allergies: No Known Allergies    Social History:     Marital Status: /Civil Union   Occupation: owns a [employer]  Patient Pre-hospital Living Situation: lives w spouse and kids  Patient Pre-hospital Level of Mobility: ambulatory  Patient Pre-hospital Diet Restrictions: none  Substance Use History:   History   Alcohol Use    Yes     Comment: socially     History   Smoking Status    Never Smoker   Smokeless Tobacco    Never Used     History   Drug Use No       Family History:    No family history on file  Physical Exam:     Vitals:   Blood Pressure: 130/69 (07/12/18 2015)  Pulse: 80 (07/12/18 2015)  Temperature: 99 7 °F (37 6 °C) (07/12/18 2015)  Temp Source: Temporal (07/12/18 2015)  Respirations: 18 (07/12/18 2015)  Height: 5' 3" (160 cm) (07/12/18 2015)  Weight - Scale: 80 kg (176 lb 5 9 oz) (07/12/18 2015)  SpO2: 100 % (07/12/18 2015)    Physical Exam   Constitutional: She is oriented to person, place, and time  She appears well-developed and well-nourished  No distress  HENT:   Head: Normocephalic and atraumatic  Eyes: Conjunctivae and EOM are normal  Right eye exhibits no discharge  Left eye exhibits no discharge  No scleral icterus  Neck: Neck supple  Cardiovascular: Normal rate, regular rhythm, normal heart sounds and intact distal pulses  No murmur heard  Pulmonary/Chest: Effort normal and breath sounds normal  No respiratory distress  She has no wheezes  She has no rales  She exhibits no tenderness  Abdominal: Soft  Bowel sounds are normal  She exhibits no distension and no mass  There is no tenderness  There is no rebound and no guarding  Musculoskeletal: She exhibits no edema  Neurological: She is alert and oriented to person, place, and time  EOMI, PERRLA, Weakness B/L UE/LE 3-4/5- equal weakness in all extremities   Skin: Skin is warm and dry  No rash noted  She is not diaphoretic  No erythema  No pallor  Psychiatric: She has a normal mood and affect  Her behavior is normal  Judgment and thought content normal      Additional Data:     Lab Results: I have personally reviewed pertinent reports          Results from last 7 days  Lab Units 07/12/18  4720 07/11/18  0544   WBC Thousand/uL 6 31 6 72   HEMOGLOBIN g/dL 10 3* 9 9*   HEMATOCRIT % 32 3* 30 3*   PLATELETS Thousands/uL 205 234   NEUTROS PCT %  --  52   LYMPHS PCT %  --  39   LYMPHO PCT % 36  --    MONOS PCT %  --  5   MONO PCT MAN % 6  --    EOS PCT %  --  4   EOSINO PCT MANUAL % 2  --        Results from last 7 days  Lab Units 07/12/18  0503   SODIUM mmol/L 142   POTASSIUM mmol/L 3 8   CHLORIDE mmol/L 111*   CO2 mmol/L 21   BUN mg/dL 8   CREATININE mg/dL 0 65   CALCIUM mg/dL 7 8*   TOTAL PROTEIN g/dL 5 2*   BILIRUBIN TOTAL mg/dL 0 20   ALK PHOS U/L 32*   ALT U/L 29   AST U/L 29   GLUCOSE RANDOM mg/dL 72       Results from last 7 days  Lab Units 07/12/18  0503   INR  1 05               Imaging: I have personally reviewed pertinent reports  No orders to display       EKG, Pathology, and Other Studies Reviewed on Admission:   · EKG: CT MRI US head/neck CXR    Allscripts / Epic Records Reviewed: Yes     ** Please Note: This note has been constructed using a voice recognition system   **

## 2018-07-13 NOTE — PROGRESS NOTES
Progress Note - Grant Car 1985, 28 y o  female MRN: [id number]    Unit/Bed#: E4 -01 Encounter: [id number]    Primary Care Provider: Neil Ballard MD   Date and time admitted to hospital: 7/12/2018  8:14 PM        * Generalized weakness   Assessment & Plan    · Mostly manifested in bilateral upper extremity weakness of b/l   · Appreciate Neurology input  · Uncertain etiology with proposed DD by Neurology including inflammatory neuropathy/myopathy such as CIDP    Conversion disorder remains a possibility  · Had lumbar puncture today 7/13 - per IR report normal appearing CSF and OP of 14; RBC, WBC, Protein, Glucose, Culture, and eval for myelin basic protein and oligoclonal bands pending  · MRI: neg for acute intracranial pathology  · Continue PT OT  · Recommended outpatient EMG NCS study and possible nerve muscle biopsy          History of migraine headaches   Assessment & Plan    · Continue Topamax and amitriptyline  · Neurology consulted        Hypokalemia   Assessment & Plan    · K remains normal  · On last admission 7/07/18, patient found to be severely hypokalemic, K 1 8 now resolved  · Monitor serum potassium  · PMD and Nephrology follow-up as outpatient        Vitamin B12 deficiency   Assessment & Plan    · Secondary to gastric bypass status  · Continue B12 supplement        Vitamin D deficiency   Assessment & Plan    · Secondary to malabsorption from gastric bypass status  · Continue D supplement        Hypocalcemia   Assessment & Plan    · Secondary to low vit D s/p gastric bypass  · Continue Ca/vit D supplement  · Monitor serum Ca        History of gastric bypass   Assessment & Plan    · S/p Roshan En Y Gastric bypass 10/2014 with resultant malabsorption  · Likely causative factor of electrolyte abnormalities  · Patient needs to be on vitamin replacement  · Outpatient follow-up with gastric bypass surgeon          VTE Pharmacologic Prophylaxis:   Pharmacologic: none, low risk  Mechanical VTE Prophylaxis in Place: Yes    Patient Centered Rounds: I have performed bedside rounds with nursing staff today  Discussions with Specialists or Other Care Team Provider: Neurology    Education and Discussions with Family / Patient: Patient    Time Spent for Care: 30 minutes  More than 50% of total time spent on counseling and coordination of care as described above  Current Length of Stay: 1 day(s)    Current Patient Status: Inpatient   Certification Statement: The patient will continue to require additional inpatient hospital stay due to need for diagnostic workup    Discharge Plan: 24-48 hours    Code Status: Level 1 - Full Code      Subjective:   Patient seen and examined  She states that she feels slightly better   She states that her lower extremity weakness has improved  She also reports improvement in her bilateral hand weakness  She denies other focal deficits  Denies vision changes  Denies nausea, vomiting or abdominal pain  Denies chest pain, shortness breath or palpitations  Objective:     Vitals:   Temp (24hrs), Av 5 °F (36 9 °C), Min:97 9 °F (36 6 °C), Max:99 7 °F (37 6 °C)    HR:  [80-99] 95  Resp:  [16-20] 20  BP: ()/(52-69) 115/52  SpO2:  [99 %-100 %] 100 %  Body mass index is 31 24 kg/m²  Input and Output Summary (last 24 hours): Intake/Output Summary (Last 24 hours) at 18 1434  Last data filed at 18 0700   Gross per 24 hour   Intake              120 ml   Output                0 ml   Net              120 ml       Physical Exam:     Physical Exam   Constitutional: She is oriented to person, place, and time  No distress  Appears older than stated age   HENT:   Head: Normocephalic and atraumatic  Eyes: Conjunctivae are normal    Neck: No JVD present  Cardiovascular: Normal rate and regular rhythm  No murmur heard  Pulmonary/Chest: Effort normal  No respiratory distress  She has no wheezes  She has no rales  Abdominal: Soft   She exhibits no distension  There is no tenderness  There is no guarding  Musculoskeletal: She exhibits no edema or tenderness  Neurological: She is alert and oriented to person, place, and time  No cranial nerve deficit  Coordination normal    Decreased b/l hand    Skin: Skin is warm and dry  No pallor  Psychiatric: She is slowed  Additional Data:     Labs:      Results from last 7 days  Lab Units 07/13/18  0605   WBC Thousand/uL 6 37   HEMOGLOBIN g/dL 11 1*   HEMATOCRIT % 35 6   PLATELETS Thousands/uL 246   NEUTROS PCT % 48   LYMPHS PCT % 40   MONOS PCT % 7   EOS PCT % 5       Results from last 7 days  Lab Units 07/13/18  0605 07/12/18  0503   SODIUM mmol/L 141 142   POTASSIUM mmol/L 4 1 3 8   CHLORIDE mmol/L 112* 111*   CO2 mmol/L 23 21   BUN mg/dL 7 8   CREATININE mg/dL 0 72 0 65   CALCIUM mg/dL 8 0* 7 8*   TOTAL PROTEIN g/dL  --  5 2*   BILIRUBIN TOTAL mg/dL  --  0 20   ALK PHOS U/L  --  32*   ALT U/L  --  29   AST U/L  --  29   GLUCOSE RANDOM mg/dL 70 72       Results from last 7 days  Lab Units 07/12/18  0503   INR  1 05                 * I Have Reviewed All Lab Data Listed Above  * Additional Pertinent Lab Tests Reviewed:  Veto Bergman Admission Reviewed    Imaging:    Imaging Reports Reviewed Today Include: MRI/MRV brain; MRI cervical spine    Recent Cultures (last 7 days):           Last 24 Hours Medication List:     Current Facility-Administered Medications:  acetaminophen 650 mg Oral Q6H PRN Keri Gonzalez MD    amitriptyline 25 mg Oral HS ARIADNA Zhong    calcium carbonate-vitamin D 1 tablet Oral BID With Meals ARIADNA Zhong    cholecalciferol 1,000 Units Oral Daily ARIADNA Zhong    cyanocobalamin 500 mcg Oral Daily ARIADNA Zhong    meclizine 25 mg Oral Q12H PRN ARIADNA Zhong    ondansetron 4 mg Intravenous Q6H PRN ARIADNA Zhong    sodium chloride 0 9 % with KCl 20 mEq/L 75 mL/hr Intravenous Continuous Sandie Burnett ARIADNA Izaguirre Last Rate: 75 mL/hr (07/13/18 0836)   topiramate 25 mg Oral Q12H [address] [facility]         Today, Patient Was Seen By: Laura Reed MD    ** Please Note: Dictation voice to text software may have been used in the creation of this document   **

## 2018-07-13 NOTE — CONSULTS
Consultation - Neurology   Kayla Costello 28 y o  female MRN: [id number]  Unit/Bed#: E4 -01 Encounter: [id number]      Assessment/Plan   1)  B/l UE abnormal muscle contracture with paresthesias and gait dysfunction - unidentified inflammatory neuropathy/ myopathy vs conversion disorder    -MRI B w and w/o and MRA/ MRV WNL    -MRI C Spine w/ and w/o WNL with exception of solitary enlargement of single left superior jugular chain lymph node   -CSF studies pending:    - RBC, WBC, Protein, Glucose, Culture, comprehensive PCR, myelin basic protein and oligoclonal bands   -Will need OP EMG/NCS +/- nerve/muscle biopsy   -PT /OT   -Will continue to follow, please monitor neuro exam notify with changes   -Please see attending at the station for further recommendations        History of Present Illness     Reason for Consult / Principal Problem: 1  Left-sided weakness 2  Weakness of both upper extremities  Hx and PE limited by:  None  HPI: Kayla Costello is a 28 y o   female who presents as a transfer from [place]  A tele-consultation was performed by Dr Christian Ching on 7/11/18, which was reviewed by my and states as follows: "(The pt) has a prior history significant for gastric bypass surgery  She reports that her symptoms began on or about Memorial Day of 2018  At that time she began to experience sustained muscle (contracture) of the distal left upper extremity causing sustained clenching of the L hand  The hand would clench in remain unsable and  tight for 10-15 minutes at a time  This would occur approximately 3 times per day  She was unable to identify any specific triggers or any Temporal pattern  Subsequently, this past week, the muscle tension transitioned to a numbness  She clarifies that the numbness is a dysesthesia/paresthesia affecting the entire LUE with otherwise intact sensation  This spread to affect the LLE as well as the LUE and there was accompanying weakness    Subsequently, this past week she starting having similar muscle contractions in her RUE  They are not severely painful, just uncomfortable and tight and do seem to respond to muscle relaxers  They have awakened her from sleep  She was admitted on Saturday 7/7/18 at which time she was found to be profoundly hypokalemic with normal magnesium  She had a MRI Brain and stroke evaluation but no evidence of ischemic stroke was found  After repleating her potassium she was eventually discharged but with no real change in her symptoms  At this point she is experiencing significant generalized weakness and came back for re-admission due to a fall at home  She has not prior history of similar symptoms before 5/2018  She denies atrophy or fasciculations  She denies any vision changes, speech or swallowing changes, she also denies bowel or bladder symptoms "     MRI C spine also negative for acute pathology with exception of solitary L superior juglar chain lymph node enlargement  Of note, the pt had an initial CPK of 968 on 7/7/18, however this has been trending down and was 281 as of 7/12/18  CRP, ESR WNL  The pt additionally was severely hypokalemic during her previous hospitalization in early July, 2018, but is now WNL  The patient denies recent stressors, though she does state that 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  On exam today, the patient reports that she had two "cramping events" overnight and 1 this morning  She describes these events as tingling that begins in her hands and then evolves into muscle contracture followed by numbness  She states that they can occur in either upper extremity bilaterally, and last approximately 10-15 minutes  Events today occurred in 1st the left upper extremity followed by the right  Additionally, the patient states that she has difficulty walking because my legs just feel very tired   Specifically denies fever, chills, CP, SOB, abdominal pain, N/V, difficulty with bowel or bladder, HA , changes in vision, ataxia, dizziness, slurred speech or difficulty with word finding  Neurological exam significant for diffuse bilateral upper extremity 4/5 weakness, appeared somewhat effort dependent, with left upper extremity drift, as well as wide-based, shuffling gait, though patient did not demonstrate lower extremity weakness on exam   Patient diffusely hyperreflexic  Remainder of exam as detailed below  Inpatient consult to Neurology  Consult performed by: Gabriel Starkey  Consult ordered by: Temi Seay          Review of Systems    Historical Information   No past medical history on file  Past Surgical History:   Procedure Laterality Date    ABDOMINAL SURGERY      APPENDECTOMY      CHOLECYSTECTOMY      GASTRIC BYPASS      HYSTERECTOMY       Social History   History   Alcohol Use    Yes     Comment: socially     History   Drug Use No     History   Smoking Status    Never Smoker   Smokeless Tobacco    Never Used     Family History: non-contributory    Review of previous medical records was completed       Meds/Allergies   Scheduled Meds:  Current Facility-Administered Medications:  acetaminophen 650 mg Oral Q6H PRN Max Prey, CRNP    amitriptyline 25 mg Oral HS Max Prey, CRNP    calcium carbonate-vitamin D 1 tablet Oral BID With Meals Max Prey, CRNP    cholecalciferol 1,000 Units Oral Daily Max Prey, CRNP    cyanocobalamin 500 mcg Oral Daily Max Prey, CRNP    LORazepam 1 mg Intravenous Once Max Prey, CRNP    meclizine 25 mg Oral Q12H PRN Max Prey, CRNP    ondansetron 4 mg Intravenous Q6H PRN Max Prey, CRNP    sodium chloride 0 9 % with KCl 20 mEq/L 75 mL/hr Intravenous Continuous Max Prey, CRNP Last Rate: 75 mL/hr (07/13/18 0836)   topiramate 25 mg Oral Q12H [address], [place]      Continuous Infusions:  sodium chloride 0 9 % with KCl 20 mEq/L 75 mL/hr Last Rate: 75 mL/hr (07/13/18 0836)     PRN Meds:   acetaminophen    meclizine    ondansetron      No Known Allergies    Objective   Vitals:Blood pressure (!) 84/59, pulse 99, temperature 98 °F (36 7 °C), temperature source Oral, resp  rate 16, height 5' 3" (1 6 m), weight 80 kg (176 lb 5 9 oz), SpO2 100 %  ,Body mass index is 31 24 kg/m²  No intake or output data in the 24 hours ending 07/13/18 0836    Invasive Devices: Invasive Devices     Peripheral Intravenous Line            Peripheral IV 07/10/18 Right Antecubital 3 days                Physical Exam   Constitutional: She is oriented to person, place, and time  She appears well-developed and well-nourished  No distress  HENT:   Head: Normocephalic and atraumatic  Right Ear: External ear normal    Left Ear: External ear normal    Nose: Nose normal    Mouth/Throat: No oropharyngeal exudate  Eyes: Conjunctivae are normal  Right eye exhibits no discharge  Left eye exhibits no discharge  No scleral icterus  Neck: Normal range of motion  Neck supple  No tracheal deviation present  No thyromegaly present  Cardiovascular: Normal rate, regular rhythm and normal heart sounds  Exam reveals no gallop and no friction rub  No murmur heard  Pulmonary/Chest: Effort normal and breath sounds normal    Abdominal: Soft  Bowel sounds are normal  She exhibits no distension  There is no tenderness  There is no rebound and no guarding  Musculoskeletal: Normal range of motion  She exhibits no edema, tenderness or deformity  Neurological: She is oriented to person, place, and time  She has a normal Finger-Nose-Finger Test and a normal Heel to [employer]    Reflex Scores:       Tricep reflexes are 1+ on the right side and 1+ on the left side  Bicep reflexes are 1+ on the right side and 1+ on the left side  Brachioradialis reflexes are 1+ on the right side and 1+ on the left side  Patellar reflexes are Right patellar reflex grade: Trace  and Left patellar reflex grade: Trace          Achilles reflexes are 0 on the right side and 0 on the left side  Skin: Skin is warm and dry  No rash noted  She is not diaphoretic  No erythema  No pallor  Psychiatric:   Appears anxious   Nursing note and vitals reviewed  Neurologic Exam     Mental Status   Oriented to person, place, and time  Follows 2 step commands  Attention: normal  Concentration: normal    Level of consciousness: alert  Normal comprehension  Cranial Nerves   Cranial nerves II through XII intact  With exception of left end gaze horizontal fatigable nystagmus  Could not visualize fundi     Motor Exam   Muscle bulk: normal  Overall muscle tone: normal  Strength 5/5 throughout with exception of bilateral upper extremity strength 4/5 including deltoids, biceps, triceps, wrist flexion and extension interosseous and     Appeared somewhat limited by effort     Sensory Exam   Light touch normal    Vibration normal    Pinprick normal      Temperature intact throughout     Gait, Coordination, and Reflexes     Gait  Gait: shuffling and wide-based    Coordination   Finger to nose coordination: normal  Heel to shin coordination: normal    Tremor   Resting tremor: absent  Intention tremor: absent  Action tremor: absent    Reflexes   Right brachioradialis: 1+  Left brachioradialis: 1+  Right biceps: 1+  Left biceps: 1+  Right triceps: 1+  Left triceps: 1+  Right patellar reflex grade: Trace  Left patellar reflex grade: Trace  Right achilles: 0  Left achilles: 0  Right plantar: normal  Left plantar: normal  Right ankle clonus: absent  Left ankle clonus: absent  No dysmetria or dysdiadochokinesia noted, though patient was somewhat slower than expected with rapid alternating movements, equal bilaterally       Lab Results: I have personally reviewed pertinent reports       Recent Results (from the past 24 hour(s))   Basic metabolic panel    Collection Time: 07/13/18  6:05 AM   Result Value Ref Range    Sodium 141 136 - 145 mmol/L    Potassium 4 1 3 5 - 5 3 mmol/L    Chloride 112 (H) 100 - 108 mmol/L    CO2 23 21 - 32 mmol/L    Anion Gap 6 4 - 13 mmol/L    BUN 7 5 - 25 mg/dL    Creatinine 0 72 0 60 - 1 30 mg/dL    Glucose 70 65 - 140 mg/dL    Calcium 8 0 (L) 8 3 - 10 1 mg/dL    eGFR 110 ml/min/1 73sq m   CBC and differential    Collection Time: 07/13/18  6:05 AM   Result Value Ref Range    WBC 6 37 4 31 - 10 16 Thousand/uL    RBC 3 73 (L) 3 81 - 5 12 Million/uL    Hemoglobin 11 1 (L) 11 5 - 15 4 g/dL    Hematocrit 35 6 34 8 - 46 1 %    MCV 95 82 - 98 fL    MCH 29 8 26 8 - 34 3 pg    MCHC 31 2 (L) 31 4 - 37 4 g/dL    RDW 15 4 (H) 11 6 - 15 1 %    MPV 11 1 8 9 - 12 7 fL    Platelets 992 528 - 834 Thousands/uL    nRBC 0 /100 WBCs    Neutrophils Relative 48 43 - 75 %    Lymphocytes Relative 40 14 - 44 %    Monocytes Relative 7 4 - 12 %    Eosinophils Relative 5 0 - 6 %    Basophils Relative 1 0 - 1 %    Neutrophils Absolute 3 08 1 85 - 7 62 Thousands/µL    Lymphocytes Absolute 2 52 0 60 - 4 47 Thousands/µL    Monocytes Absolute 0 45 0 17 - 1 22 Thousand/µL    Eosinophils Absolute 0 29 0 00 - 0 61 Thousand/µL    Basophils Absolute 0 03 0 00 - 0 10 Thousands/µL   Magnesium    Collection Time: 07/13/18  6:05 AM   Result Value Ref Range    Magnesium 1 9 1 6 - 2 6 mg/dL   ]    Imaging Studies: I have personally reviewed pertinent reports  and I have personally reviewed pertinent films in PACS  EKG, Pathology, and Other Studies: I have personally reviewed pertinent reports      VTE Prophylaxis: Sequential compression device Ginny Casa)     Code Status: Level 1 - Full Code  Advance Directive and Living Will:      Power of :    POLST:

## 2018-07-13 NOTE — ASSESSMENT & PLAN NOTE
· K remains normal  · On last admission 7/07/18, patient found to be severely hypokalemic, K 1 8 now resolved  · Monitor serum potassium  · PMD and Nephrology follow-up as outpatient

## 2018-07-13 NOTE — PHYSICAL THERAPY NOTE
PHYSICAL THERAPY NOTE          Patient Name: Sylvester Ross  SLQRB'F Date: 7/13/2018     PT order received  Pt off floor to IR for lumbar puncture  Will continue to follow as appropriate  Nsg notified   Alyssa Aguilar PT

## 2018-07-13 NOTE — BRIEF OP NOTE (RAD/CATH)
Lumbar puncture Procedure Note    PATIENT NAME: Blayne Hayes  : 1985  MRN: [id number]     Pre-op Diagnosis:   1  Left-sided weakness    2  Weakness of both upper extremities    3  Hypokalemia    4  History of gastric bypass    5  Hypocalcemia    6  Weakness of both lower extremities      Post-op Diagnosis:   1  Left-sided weakness    2  Weakness of both upper extremities    3  Hypokalemia    4  History of gastric bypass    5  Hypocalcemia    6   Weakness of both lower extremities        Surgeon:   Manuel Trujillo DO  Assistants:     No qualified resident was available, Resident is only observing    Estimated Blood Loss: minimal  Findings: opening pressure 14    Specimens: clear csf    Complications:  None apparent    Anesthesia: Local    Manuel Trujillo DO     Date: 2018  Time: 12:20 PM

## 2018-07-14 PROBLEM — G43.009 MIGRAINE WITHOUT AURA AND WITHOUT STATUS MIGRAINOSUS, NOT INTRACTABLE: Status: ACTIVE | Noted: 2018-07-07

## 2018-07-14 LAB
ANION GAP SERPL CALCULATED.3IONS-SCNC: 7 MMOL/L (ref 4–13)
BASOPHILS # BLD AUTO: 0.04 THOUSANDS/ΜL (ref 0–0.1)
BASOPHILS NFR BLD AUTO: 1 % (ref 0–1)
BUN SERPL-MCNC: 7 MG/DL (ref 5–25)
CALCIUM SERPL-MCNC: 8.2 MG/DL (ref 8.3–10.1)
CHLORIDE SERPL-SCNC: 108 MMOL/L (ref 100–108)
CO2 SERPL-SCNC: 22 MMOL/L (ref 21–32)
CREAT SERPL-MCNC: 0.68 MG/DL (ref 0.6–1.3)
EOSINOPHIL # BLD AUTO: 0.31 THOUSAND/ΜL (ref 0–0.61)
EOSINOPHIL NFR BLD AUTO: 5 % (ref 0–6)
ERYTHROCYTE [DISTWIDTH] IN BLOOD BY AUTOMATED COUNT: 15.1 % (ref 11.6–15.1)
GFR SERPL CREATININE-BSD FRML MDRD: 114 ML/MIN/1.73SQ M
GLUCOSE SERPL-MCNC: 71 MG/DL (ref 65–140)
HCT VFR BLD AUTO: 32.4 % (ref 34.8–46.1)
HGB BLD-MCNC: 10.2 G/DL (ref 11.5–15.4)
LYMPHOCYTES # BLD AUTO: 2.29 THOUSANDS/ΜL (ref 0.6–4.47)
LYMPHOCYTES NFR BLD AUTO: 38 % (ref 14–44)
MCH RBC QN AUTO: 29.8 PG (ref 26.8–34.3)
MCHC RBC AUTO-ENTMCNC: 31.5 G/DL (ref 31.4–37.4)
MCV RBC AUTO: 95 FL (ref 82–98)
MONOCYTES # BLD AUTO: 0.45 THOUSAND/ΜL (ref 0.17–1.22)
MONOCYTES NFR BLD AUTO: 8 % (ref 4–12)
NEUTROPHILS # BLD AUTO: 2.92 THOUSANDS/ΜL (ref 1.85–7.62)
NEUTS SEG NFR BLD AUTO: 49 % (ref 43–75)
NRBC BLD AUTO-RTO: 0 /100 WBCS
PLATELET # BLD AUTO: 237 THOUSANDS/UL (ref 149–390)
PMV BLD AUTO: 11 FL (ref 8.9–12.7)
POTASSIUM SERPL-SCNC: 4.1 MMOL/L (ref 3.5–5.3)
RBC # BLD AUTO: 3.42 MILLION/UL (ref 3.81–5.12)
SODIUM SERPL-SCNC: 137 MMOL/L (ref 136–145)
T3 SERPL-MCNC: 1 NG/ML (ref 0.6–1.8)
T4 FREE SERPL-MCNC: 0.74 NG/DL (ref 0.76–1.46)
WBC # BLD AUTO: 6.01 THOUSAND/UL (ref 4.31–10.16)

## 2018-07-14 PROCEDURE — 85025 COMPLETE CBC W/AUTO DIFF WBC: CPT | Performed by: FAMILY MEDICINE

## 2018-07-14 PROCEDURE — 99232 SBSQ HOSP IP/OBS MODERATE 35: CPT | Performed by: FAMILY MEDICINE

## 2018-07-14 PROCEDURE — 84439 ASSAY OF FREE THYROXINE: CPT | Performed by: FAMILY MEDICINE

## 2018-07-14 PROCEDURE — G8979 MOBILITY GOAL STATUS: HCPCS | Performed by: PHYSICAL THERAPIST

## 2018-07-14 PROCEDURE — G8980 MOBILITY D/C STATUS: HCPCS | Performed by: PHYSICAL THERAPIST

## 2018-07-14 PROCEDURE — G8978 MOBILITY CURRENT STATUS: HCPCS | Performed by: PHYSICAL THERAPIST

## 2018-07-14 PROCEDURE — 80048 BASIC METABOLIC PNL TOTAL CA: CPT | Performed by: FAMILY MEDICINE

## 2018-07-14 PROCEDURE — 84480 ASSAY TRIIODOTHYRONINE (T3): CPT | Performed by: FAMILY MEDICINE

## 2018-07-14 PROCEDURE — 97163 PT EVAL HIGH COMPLEX 45 MIN: CPT | Performed by: PHYSICAL THERAPIST

## 2018-07-14 RX ORDER — BUTALBITAL, ACETAMINOPHEN AND CAFFEINE 50; 325; 40 MG/1; MG/1; MG/1
1 TABLET ORAL EVERY 4 HOURS PRN
Status: DISCONTINUED | OUTPATIENT
Start: 2018-07-14 | End: 2018-07-15 | Stop reason: HOSPADM

## 2018-07-14 RX ORDER — SODIUM CHLORIDE 9 MG/ML
125 INJECTION, SOLUTION INTRAVENOUS CONTINUOUS
Status: DISCONTINUED | OUTPATIENT
Start: 2018-07-14 | End: 2018-07-14

## 2018-07-14 RX ADMIN — BUTALBITAL, ACETAMINOPHEN, AND CAFFEINE 1 TABLET: 50; 325; 40 TABLET ORAL at 10:54

## 2018-07-14 RX ADMIN — VITAMIN D, TAB 1000IU (100/BT) 1000 UNITS: 25 TAB at 08:21

## 2018-07-14 RX ADMIN — SODIUM CHLORIDE 125 ML/HR: 0.9 INJECTION, SOLUTION INTRAVENOUS at 10:54

## 2018-07-14 RX ADMIN — TOPIRAMATE 25 MG: 25 TABLET, FILM COATED ORAL at 21:30

## 2018-07-14 RX ADMIN — CYANOCOBALAMIN TAB 500 MCG 500 MCG: 500 TAB at 08:22

## 2018-07-14 RX ADMIN — BUTALBITAL, ACETAMINOPHEN, AND CAFFEINE 1 TABLET: 50; 325; 40 TABLET ORAL at 20:17

## 2018-07-14 RX ADMIN — ZINC 1 TABLET: TAB ORAL at 08:22

## 2018-07-14 RX ADMIN — TOPIRAMATE 25 MG: 25 TABLET, FILM COATED ORAL at 08:22

## 2018-07-14 RX ADMIN — CALCIUM CARBONATE 500 MG (1,250 MG)-VITAMIN D3 200 UNIT TABLET 1 TABLET: at 08:21

## 2018-07-14 RX ADMIN — ACETAMINOPHEN 650 MG: 325 TABLET, FILM COATED ORAL at 08:21

## 2018-07-14 RX ADMIN — BUTALBITAL, ACETAMINOPHEN, AND CAFFEINE 1 TABLET: 50; 325; 40 TABLET ORAL at 16:01

## 2018-07-14 RX ADMIN — AMITRIPTYLINE HYDROCHLORIDE 25 MG: 50 TABLET, FILM COATED ORAL at 21:32

## 2018-07-14 RX ADMIN — CALCIUM CARBONATE 500 MG (1,250 MG)-VITAMIN D3 200 UNIT TABLET 1 TABLET: at 16:01
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
## 2018-07-14 NOTE — PROGRESS NOTES
Progress Note - Lynda Mae 1985, 28 y o  female MRN: [id number]    Unit/Bed#: E4 -01 Encounter: [id number]    Primary Care Provider: Farrukh Ayers MD   Date and time admitted to hospital: 7/12/2018  8:14 PM        * Generalized weakness   Assessment & Plan    · Mostly manifested in bilateral upper extremity weakness of b/l , appears improved  · Appreciate Neurology input  · CSF studies unremarkable, normal opening pressure, oligoclonal banding and myelin associated glycoprotein pending and can be followed up outpatient  · MRI: neg for acute intracranial pathology  · Continue PT OT will discharge home with outpatient PT/OT based on their recommendations  · Recommended outpatient EMG NCS study and possible nerve muscle biopsy          Migraine without aura and without status migrainosus, not intractable   Assessment & Plan    · With prior history  · Continue Topamax and amitriptyline  · Neurology consulted  · Patient had an episode of headache that started approximately 2 hr after her lumbar puncture yesterday (7/13) and the headache persisting into today mostly posterior/occipital region   For this she is placed on IV fluids and started on Fioricet with subsequent improvement        Vitamin B12 deficiency   Assessment & Plan    · Secondary to gastric bypass status  · Continue B12 supplement, she should continue upon discharge        Vitamin D deficiency   Assessment & Plan    · Secondary to malabsorption from gastric bypass status  · Continue D supplement        History of gastric bypass   Assessment & Plan    · S/p Roshan En Y Gastric bypass 10/2014 with resultant malabsorption  · Likely causative factor of electrolyte abnormalities  · Patient needs to be on vitamin replacement, explained to patient in detail, she verbalized understanding  · Outpatient follow-up with gastric bypass surgeon          VTE Pharmacologic Prophylaxis:   Pharmacologic: none, low risk  Mechanical VTE Prophylaxis in Place: Yes    Patient Centered Rounds: I have performed bedside rounds with nursing staff today  Discussions with Specialists or Other Care Team Provider: Neurology    Education and Discussions with Family / Patient: Patient    Time Spent for Care: 30 minutes  More than 50% of total time spent on counseling and coordination of care as described above  Current Length of Stay: 2 day(s)    Current Patient Status: Inpatient   Certification Statement: The patient will continue to require additional inpatient hospital stay due to need for pain control, diagnostic workup    Discharge Plan: 24 hours    Code Status: Level 1 - Full Code      Subjective:   Patient seen and examined  She states that she feels better in regards to her lower extremity weakness and has more control when walking  She also reports increased strength in her bilateral upper extremities  The patient is complaining of worsening headache in the occipital region that she developed after lumbar puncture yesterday approximately 2 hr subsequently, she did then received Tylenol which gave her med relieved but the headache persisted today  Denies visual changes  Denies nausea or vomiting  Objective:     Vitals:   Temp (24hrs), Av 3 °F (36 8 °C), Min:96 5 °F (35 8 °C), Max:99 2 °F (37 3 °C)    HR:  [] 86  Resp:  [18] 18  BP: (103-115)/(53-75) 115/75  SpO2:  [99 %-100 %] 100 %  Body mass index is 31 24 kg/m²  Input and Output Summary (last 24 hours): Intake/Output Summary (Last 24 hours) at 18 1634  Last data filed at 18 1054   Gross per 24 hour   Intake                0 ml   Output                0 ml   Net                0 ml       Physical Exam:     Physical Exam   Constitutional: She is oriented to person, place, and time  No distress  HENT:   Head: Normocephalic and atraumatic  Eyes: Conjunctivae are normal    Neck: No JVD present  Cardiovascular: Normal rate and regular rhythm      No murmur heard   Pulmonary/Chest: Effort normal  No respiratory distress  She has no wheezes  She has no rales  Abdominal: Soft  She exhibits no distension  There is no tenderness  There is no guarding  Musculoskeletal: She exhibits no edema  Neurological: She is alert and oriented to person, place, and time  No cranial nerve deficit  She exhibits normal muscle tone  Coordination normal    Skin: Skin is warm and dry  Psychiatric: She has a normal mood and affect  Additional Data:     Labs:      Results from last 7 days  Lab Units 07/14/18  0606   WBC Thousand/uL 6 01   HEMOGLOBIN g/dL 10 2*   HEMATOCRIT % 32 4*   PLATELETS Thousands/uL 237   NEUTROS PCT % 49   LYMPHS PCT % 38   MONOS PCT % 8   EOS PCT % 5       Results from last 7 days  Lab Units 07/14/18  0606  07/12/18  0503   SODIUM mmol/L 137  < > 142   POTASSIUM mmol/L 4 1  < > 3 8   CHLORIDE mmol/L 108  < > 111*   CO2 mmol/L 22  < > 21   BUN mg/dL 7  < > 8   CREATININE mg/dL 0 68  < > 0 65   CALCIUM mg/dL 8 2*  < > 7 8*   TOTAL PROTEIN g/dL  --   --  5 2*   BILIRUBIN TOTAL mg/dL  --   --  0 20   ALK PHOS U/L  --   --  32*   ALT U/L  --   --  29   AST U/L  --   --  29   GLUCOSE RANDOM mg/dL 71  < > 72   < > = values in this interval not displayed  Results from last 7 days  Lab Units 07/12/18  0503   INR  1 05                 * I Have Reviewed All Lab Data Listed Above  * Additional Pertinent Lab Tests Reviewed:  [facility] Admission Reviewed    Imaging:    Imaging Reports Reviewed Today Include: none today    Recent Cultures (last 7 days):       Results from last 7 days  Lab Units 07/13/18  1206   GRAM STAIN RESULT  1+ Mononuclear Cells  No No Polys or Bacteria seen       Last 24 Hours Medication List:     Current Facility-Administered Medications:  amitriptyline 25 mg Oral HS ARI[place] Mendes    butalbital-acetaminophen-caffeine 1 tablet Oral Q4H PRN Bassam Mauricio MD    calcium carbonate-vitamin D 1 tablet Oral BID With Meals [place]uts, [address]    cholecalciferol 1,000 Units Oral Daily ARIADNA Bergman    cyanocobalamin 500 mcg Oral Daily ARIADNA Bergman    meclizine 25 mg Oral Q12H PRN ARIADNA Bergman    multivitamin stress formula 1 tablet Oral Daily Nadja Pollard MD    ondansetron 4 mg Intravenous Q6H PRN ARIADNA Bergman    sodium chloride 125 mL/hr Intravenous Continuous Yisel Cruz MD Last Rate: 125 mL/hr (07/14/18 1054)   topiramate [facility]12H [facility]         Today, Patient Was Seen By: Dyllan Simms MD    ** Please Note: Dictation voice to text software may have been used in the creation of this document   **

## 2018-07-14 NOTE — ASSESSMENT & PLAN NOTE
· Mostly manifested in bilateral upper extremity weakness of b/l , appears improved  · Appreciate Neurology input  · CSF studies unremarkable, normal opening pressure, oligoclonal banding and myelin associated glycoprotein pending and can be followed up outpatient  · MRI: neg for acute intracranial pathology  · Continue PT OT will discharge home with outpatient PT/OT based on their recommendations  · Recommended outpatient EMG NCS study and possible nerve muscle biopsy

## 2018-07-14 NOTE — ASSESSMENT & PLAN NOTE
· With prior history  · Continue Topamax and amitriptyline  · Neurology consulted  · Patient had an episode of headache that started approximately 2 hr after her lumbar puncture yesterday (7/13) and the headache persisting into today mostly posterior/occipital region   For this she is placed on IV fluids and started on Fioricet with subsequent improvement

## 2018-07-14 NOTE — ASSESSMENT & PLAN NOTE
· S/p Roshan En Y Gastric bypass 10/2014 with resultant malabsorption  · Likely causative factor of electrolyte abnormalities  · Patient needs to be on vitamin replacement, explained to patient in detail, she verbalized understanding  · Outpatient follow-up with gastric bypass surgeon

## 2018-07-14 NOTE — PHYSICAL THERAPY NOTE
Physical Therapy Evaluation      Patient Active Problem List   Diagnosis    Hypokalemia    History of gastric bypass    History of migraine headaches    Left-sided weakness    Hypocalcemia    Anemia    Vitamin D deficiency    Weakness of both lower extremities    Weakness of both upper extremities    Elevated CPK    Vitamin B12 deficiency    Cervical lymphadenopathy    Generalized weakness       No past medical history on file  Past Surgical History:   Procedure Laterality Date    ABDOMINAL SURGERY      APPENDECTOMY      CHOLECYSTECTOMY      GASTRIC BYPASS      HYSTERECTOMY          07/14/18 1440   Note Type   Note type Eval only   Pain Assessment   Pain Assessment 0-10   Pain Score 8   Pain Type Acute pain   Pain Location Head   Hospital Pain Intervention(s) Repositioned; Ambulation/increased activity; Emotional support   Response to Interventions tolerated well    Home Living   Type of [address] One level   Prior Function   Level of [place] Independent with ADLs and functional mobility   Lives With Spouse   Receives Help From Family   ADL Assistance Independent   IADLs Independent   Falls in the last 6 months 1 to 4   Comments pt indep with amb no device and sdl's  2 falls recently, 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  has 2 kids      Restrictions/Precautions   Weight Bearing Precautions Per Order No   Other Precautions Pain;Multiple lines   General   Family/Caregiver Present No   Cognition   Overall Cognitive Status WFL   Orientation Level Oriented X4   RUE Assessment   RUE Assessment X  (strength 4-/5, give way weakness)   LUE Assessment   LUE Assessment X  (strength 3+/5, give way weakness)   RLE Assessment   RLE Assessment X  (strength 4/5, hip flex give way weakness)   LLE Assessment   LLE Assessment X  (strength 4-/5, hip flex give way weakness)   Coordination   Movements are Fluid and Coordinated 1   Light Touch   RLE Light Touch Grossly intact LLE Light Touch Grossly intact   Bed Mobility   Rolling R 7  Independent   Rolling L 7  Independent   Supine to Sit 7  Independent   Sit to Supine 7  Independent   Transfers   Sit to Stand 7  Independent   Stand to Sit 7  Independent   Ambulation/Elevation   Gait pattern WNL   Gait Assistance 7  Independent   Assistive Device None   Distance 15'   Balance   Static Sitting Normal   Dynamic Sitting Good   Static Standing Fair +   Dynamic Standing Fair   Ambulatory Fair   Endurance Deficit   Endurance Deficit Yes   Endurance Deficit Description severe headache, - per pt, likely post tap headache  Activity Tolerance   Activity Tolerance Patient tolerated treatment well;Patient limited by pain  (limited amb due ot headache)   Nurse Made Aware yes- chris   Assessment   Assessment pt admitted with ble weakness and falling  pt just discharged [facility]  pt did on that occasion have critically low K+  pt refered to PT with weakness of undetermined source r/o neuropathic, myopathic or conversion causes  pt was indep prior to recent illness, teaching timoteo and doing massage therapy  pt demosntrated generalized weakness greater in ue's and with ue giveway weakness  pt was able to mobilize independently without assistive device  pt demonstrated normal sequencing and balance  pt limited distance due to severe headache  pt will be able to return home  educated on timing exercise with intact of electolyte containing foods to avoid deficit related to high intensity exercise  pt does not need skilled PT, can mobilize with nursing  d/c PT   Barriers to Discharge None   Recommendation   Recommendation Home with family support; Outpatient PT   PT - OK to Discharge Yes   Modified [place] Scale   Modified [place] Scale 2   Barthel Index   Feeding 10   Bathing 5   Grooming Score 5   Dressing Score 10   Bladder Score 10   Bowels Score 10   Toilet Use Score 10   Transfers (Bed/Chair) Score 15   Mobility (Level Surface) Score 0 Stairs Score 0   Barthel Index Score 75   History: co - morbidities, fall risk,  multiple lines  Exam: impairments in musculoskeletal, balance, locomotion   Systems evaluated- rom, strength, balance, locomotion, sensation, activity tolerance,   Clinical: unstable/unpredictable  Complexity:high        Ernst Jarad, PT

## 2018-07-15 VITALS
SYSTOLIC BLOOD PRESSURE: 101 MMHG | OXYGEN SATURATION: 100 % | TEMPERATURE: 98.4 F | BODY MASS INDEX: 31.25 KG/M2 | HEIGHT: 63 IN | DIASTOLIC BLOOD PRESSURE: 66 MMHG | HEART RATE: 76 BPM | RESPIRATION RATE: 18 BRPM | WEIGHT: 176.37 LBS

## 2018-07-15 PROCEDURE — 99233 SBSQ HOSP IP/OBS HIGH 50: CPT | Performed by: PSYCHIATRY & NEUROLOGY

## 2018-07-15 PROCEDURE — 99239 HOSP IP/OBS DSCHRG MGMT >30: CPT | Performed by: FAMILY MEDICINE

## 2018-07-15 RX ORDER — BUTALBITAL, ACETAMINOPHEN AND CAFFEINE 50; 325; 40 MG/1; MG/1; MG/1
1 TABLET ORAL EVERY 4 HOURS PRN
Qty: 30 TABLET | Refills: 0 | Status: SHIPPED | OUTPATIENT
Start: 2018-07-15 | End: 2018-10-14

## 2018-07-15 RX ORDER — MULTIVIT-MIN/IRON FUM/FOLIC AC 7.5 MG-4
1 TABLET ORAL DAILY
Qty: 30 TABLET | Refills: 0 | Status: SHIPPED | OUTPATIENT
Start: 2018-07-15 | End: 2018-10-14

## 2018-07-15 RX ADMIN — VITAMIN D, TAB 1000IU (100/BT) 1000 UNITS: 25 TAB at 09:24

## 2018-07-15 RX ADMIN — CALCIUM CARBONATE 500 MG (1,250 MG)-VITAMIN D3 200 UNIT TABLET 1 TABLET: at 09:24

## 2018-07-15 RX ADMIN — ZINC 1 TABLET: TAB ORAL at 09:24

## 2018-07-15 RX ADMIN — BUTALBITAL, ACETAMINOPHEN, AND CAFFEINE 1 TABLET: 50; 325; 40 TABLET ORAL at 10:29

## 2018-07-15 RX ADMIN — BUTALBITAL, ACETAMINOPHEN, AND CAFFEINE 1 TABLET: 50; 325; 40 TABLET ORAL at 02:05

## 2018-07-15 RX ADMIN — CYANOCOBALAMIN TAB 500 MCG 500 MCG: 500 TAB at 09:24

## 2018-07-15 RX ADMIN — BUTALBITAL, ACETAMINOPHEN, AND CAFFEINE 1 TABLET: 50; 325; 40 TABLET ORAL at 06:03

## 2018-07-15 RX ADMIN — TOPIRAMATE 25 MG: 25 TABLET, FILM COATED ORAL at 09:24

## 2018-07-15 NOTE — PROGRESS NOTES
Progress Note - Neurology   [place] 28 y o  female MRN: [id number]  Unit/Bed#: E4 -01 Encounter: [id number]        Assessment/Plan :  1  Bilateral upper extremity with abnormal muscle contracture/paresthesias and gait dysfunction: Etiology unclear with ddx concern for unidentified inflammatory neuropathy/ myopathy vs possible severe hypokalemia (1 8)  on admission vs conversion disorder  Patient with improvement in symptoms today  -MRI Brain w/wo contrast normal   -MRA/ MRV normal  -MRI C Spine w/ and w/o normal with exception of solitary enlargement of single left superior jugular chain lymph node  -CSF studies: RBC 2, WBC 1, Protein 28, Glucose 51, comprehensive PCR negative, Culture, myelin basic protein and oligoclonal bands pending  -PT /OT following  -Will need outpatient EMG/NCS and follow up with neuromuscular as outpatient  -Will continue to follow, please monitor neuro exam notify with changes    2  Intractable Headache: patient with h/o migraines usually in the right temporal area  She states this headache is different then her normal migraine  She states it is in the occipital region, worsens in upright position to "8/10" with relief in supine position  No neck tenderness of exam  Started on the morning of 7/14/18 s/p LP  -recommended Caffiene 200 mg PO TID for 3 days  -recommend staying hydrated   -continue Fioricet PRN      Subjective:   No acute events overnight  Patient states weakness has improved substantially since admission  Now has occipital headache starting yesterday morning, worsening in upright position and relief in supine position  She can not drink large amounts of fluid or caffienated beverages like soda due to gastric bypass surgery  Vitals: Blood pressure 101/66, pulse 76, temperature 98 4 °F (36 9 °C), temperature source Temporal, resp  rate 18, height 5' 3" (1 6 m), weight 80 kg (176 lb 5 9 oz), SpO2 100 %  ,Body mass index is 31 24 kg/m²      MEDS:    Current Facility-Administered Medications:  amitriptyline 25 mg Oral HS Praful Hemp, CRNP   butalbital-acetaminophen-caffeine 1 tablet Oral Q4H PRN Esau Castillo MD   calcium carbonate-vitamin D 1 tablet Oral BID With Meals Praful Hemp, CRNP   cholecalciferol 1,000 Units Oral Daily Praful Hemp, CRNP   cyanocobalamin 500 mcg Oral Daily Praful Hemp, CRNP   meclizine 25 mg Oral Q12H PRN Praful Hemp, CRNP   multivitamin stress formula 1 tablet Oral Daily Nadja Pollard MD   ondansetron 4 mg Intravenous Q6H PRN Praful Hemp, CRNP   topiramate 25 mg Oral Q12H [facility]     Physical Exam   Constitutional: She is oriented to person, place, and time  She appears well-developed and well-nourished  No distress  HENT:   Head: Normocephalic and atraumatic  Mouth/Throat: Oropharynx is clear and moist    Eyes: EOM are normal  Pupils are equal, round, and reactive to light  Right eye exhibits no discharge  Left eye exhibits no discharge  No scleral icterus  Neck: Normal range of motion  Cardiovascular: Normal rate, regular rhythm, normal heart sounds and intact distal pulses  Exam reveals no gallop and no friction rub  No murmur heard  Pulmonary/Chest: Effort normal and breath sounds normal  No stridor  No respiratory distress  She has no wheezes  She has no rales  Abdominal: Soft  Bowel sounds are normal  She exhibits no distension  There is no tenderness  Musculoskeletal: Normal range of motion  She exhibits no edema, tenderness or deformity  Neurological: She is alert and oriented to person, place, and time  Skin: Skin is warm and dry  No erythema  Psychiatric: She has a normal mood and affect  Her speech is normal and behavior is normal  Judgment and thought content normal        Neurologic Exam     Mental Status   Oriented to person, place, and time  Follows 2 step commands     Attention: normal  Concentration: normal    Speech: speech is normal Level of consciousness: alert  Knowledge: good  Cranial Nerves     CN III, IV, VI   Pupils are equal, round, and reactive to light  Extraocular motions are normal        Lab Results:   I have personally reviewed pertinent reports      CBC:   Results from last 7 days  Lab Units 07/14/18  0606 07/13/18  0605 07/12/18  0503   WBC Thousand/uL 6 01 6 37 6 31   RBC Million/uL 3 42* 3 73* 3 40*   HEMOGLOBIN g/dL 10 2* 11 1* 10 3*   HEMATOCRIT % 32 4* 35 6 32 3*   MCV fL 95 95 95   PLATELETS Thousands/uL 237 246 205     BMP/CMP:   Results from last 7 days  Lab Units 07/14/18  0606 07/13/18  0605 07/12/18  0503 07/11/18  1902 07/11/18  0544 07/10/18  2155   SODIUM mmol/L 137 141 142  --  142  --    POTASSIUM mmol/L 4 1 4 1 3 8  --  3 4*  --    CHLORIDE mmol/L 108 112* 111*  --  110*  --    CO2 mmol/L 22 23 21  --  24  --    ANION GAP mmol/L 7 6 10  --  8  --    BUN mg/dL 7 7 8  --  8  --    CREATININE mg/dL 0 68 0 72 0 65  --  0 74  --    GLUCOSE RANDOM mg/dL 71 70 72  --  95  --    CALCIUM mg/dL 8 2* 8 0* 7 8*  --  7 6*  --    AST U/L  --   --  29  --  32 42   ALT U/L  --   --  29  --  35 40   ALK PHOS U/L  --   --  32*  --  34* 37*   TOTAL PROTEIN g/dL  --   --  5 2* 5 7* 5 0* 5 7*   BILIRUBIN TOTAL mg/dL  --   --  0 20  --  0 20 0 20   EGFR ml/min/1 73sq m 114 110 116  --  106  --      Vitamin B12:   Results from last 7 days  Lab Units 07/08/18  1619   VITAMIN B 12 pg/mL 130      TSH:   Results from last 7 days  Lab Units 07/11/18  0052 07/10/18  2058   TSH 3RD GENERATON uIU/mL 1 276 0 783      Coagulation:   Results from last 7 days  Lab Units 07/12/18  0503   INR  1 05      Urinalysis:   Results from last 7 days  Lab Units 07/11/18  0038   COLOR UA  Yellow   CLARITY UA  Slightly Cloudy   SPEC GRAV UA  1 020   PH UA  6 0   LEUKOCYTES UA  Negative   NITRITE UA  Negative   PROTEIN UA mg/dl Negative   GLUCOSE UA mg/dl Negative   KETONES UA mg/dl Negative   BILIRUBIN UA  Negative   BLOOD UA  Negative   , Drug Screen: Results from last 7 days  Lab Units 07/12/18  0426   BARBITURATE UR  Negative   BENZODIAZEPINE UR  Negative   THC UR  Negative   COCAINE UR  Negative   METHADONE URINE  Negative   OPIATE UR  Negative   PCP UR  Negative     Imaging Studies: I have personally reviewed pertinent reports  and I have personally reviewed pertinent films in PACS     EEG, Pathology, and Other Studies: I have personally reviewed pertinent reports  and I have personally reviewed pertinent films in PACS    VTE Prophylaxis: Sequential compression device (Venodyne)      Counseling / Coordination of Care  Total time spent today 30 minutes  Greater than 50% of total time was spent with the patient and / or family counseling and / or coordination of care   A description of the counseling / coordination of care: discussion of plan of care for discharge with Attending Neurologist and patient

## 2018-07-15 NOTE — ASSESSMENT & PLAN NOTE
· With prior history  · Continue Topamax and amitriptyline  · Neurology consulted  · Patient had an episode of headache that started approximately 2 hr after her lumbar puncture yesterday (7/13) and the headache persisting into today mostly posterior/occipital region  For this she was placed on IV fluids and started on Fioricet with subsequent improvement however headache has not completely resolved - ER precautions resolved, patient confident that she can manage with the headache at home and asking to be discharged home

## 2018-07-15 NOTE — DISCHARGE SUMMARY
Discharge- Jessee Marinelli 1985, 28 y o  female MRN: [id number]    Unit/Bed#: E4 -01 Encounter: [id number]    Primary Care Provider: Isaias Holm MD   Date and time admitted to hospital: 7/12/2018  8:14 PM        * Generalized weakness   Assessment & Plan    · Mostly manifested in bilateral upper extremity weakness of b/l , significantly improved  · Appreciate Neurology input  · CSF studies unremarkable, normal opening pressure, oligoclonal banding and myelin associated glycoprotein pending and can be followed up outpatient  · MRI: neg for acute intracranial pathology  · Continue PT OT will discharge home with outpatient PT/OT based on their recommendations  · Recommended outpatient EMG NCS study and possible nerve muscle biopsy          Migraine without aura and without status migrainosus, not intractable   Assessment & Plan    · With prior history  · Continue Topamax and amitriptyline  · Neurology consulted  · Patient had an episode of headache that started approximately 2 hr after her lumbar puncture yesterday (7/13) and the headache persisting into today mostly posterior/occipital region  For this she was placed on IV fluids and started on Fioricet with subsequent improvement however headache has not completely resolved - ER precautions resolved, patient confident that she can manage with the headache at home and asking to be discharged home          Hypokalemia   Assessment & Plan    · K remains normal  · On last admission 7/07/18, patient found to be severely hypokalemic, K 1 8 now resolved  · Monitor serum potassium  · PMD and Nephrology follow-up as outpatient        Vitamin B12 deficiency   Assessment & Plan    · Secondary to gastric bypass status  · Continue B12 supplement, she should continue upon discharge        Vitamin D deficiency   Assessment & Plan    · Secondary to malabsorption from gastric bypass status  · Continue D supplement        Hypocalcemia   Assessment & Plan    · Secondary to low vit D s/p gastric bypass  · Continue Ca/vit D supplement  · Monitor serum Ca outpatient        History of gastric bypass   Assessment & Plan    · S/p [facility] [facility] Gastric bypass 10/2014 with resultant malabsorption  · Likely causative factor of electrolyte abnormalities  · Patient needs to be on vitamin replacement, explained to patient in detail, she verbalized understanding  · Outpatient follow-up with gastric bypass surgeon          Discharging Physician / Practitioner: Corrine Ferguson MD  PCP: Porter Goldberg MD  Admission Date:   Admission Orders     Ordered        07/12/18 2027  Inpatient Admission  Once             Discharge Date: 07/15/18    Resolved Problems  Date Reviewed: 7/15/2018    None          Consultations During Hospital Stay:  · Neurology    Procedures Performed:     · Lumbar Puncture 7/13 under fluoroscopic guidance  Significant Findings / Test Results:     CSF Culture No growth            Protein, CSF 15 - 45 mg/dL 28      RBC, CSF 0 - 10 uL 2      Glucose, CSF 50 - 80 mg/dL 51      Comprehensive PCR, CSF   Order: [id number]   Status:  Final result   Visible to patient:  No (Inaccessible in MyChart)   Next appt:  None    Ref Range & Units 7/13/18 1205   E COLI K1 Not Detected Not Detected    H INFLUENZAE Not Detected Not Detected    L  MONOCYTOGENES Not Detected Not Detected    N MENINGITIDIS Not Detected Not Detected    S AGALACTIAE Not Detected Not Detected    S  PNEUMONIAE Not Detected Not Detected    CYTOMEGALOVIRUS Not Detected Not Detected    ENTEROVIRUS Not Detected Not Detected    H SIMPLEX 1 Not Detected Not Detected    H SIMPLEX 2 Not Detected Not Detected    HERPES VIRUS 6 Not Detected Not Detected    PARECHOVIRUS Not Detected Not Detected    V ZOSTER Not Detected Not Detected    C NEOFORMANS/GATTII Not Detected Not Detected                Incidental Findings:   · None    Test Results Pending at Discharge (will require follow up):    · Myelin basic protein, CSF  · Oligoclonal banding CSF     Outpatient Tests Requested:  · None    Complications:  Headache     Reason for Admission:  Extremity weakness  Hospital Course: Jennie Mercado is a 28 y o  female patient who originally presented to the hospital on 7/12/2018 due to extremity weakness  The patient was a transfer from a [facility] for further workup for her neurological complaints and for a lumbar puncture in evaluation of the opening pressure  Patient's CSF studies were unremarkable with myelin basic protein and elevated clonal pending result still pending at the time of the discharge  The lumbar puncture procedure was complicated by a posterior headache that she developed several hours after the procedure  On the day of the discharge the headache was improved and the patient wanted to go home  Regards to her bilateral upper and lower extremity weakness, it has significantly improved and near normalized on the day of the discharge  The patient was able to ambulate, she was able to perform all activities of daily living, at the strength of her  also normalized bilaterally  No new focal deficits were identified  She was discharged with a script for Fioricet to continue to treat her headache  She was also advised to maintain high hydration level with oral fluids and she verbalized understanding  In regards to patient's history of gastric bypass surgery with resultant electrolyte abnormalities and vitamin deficiencies, she was instructed to continue oral replacement  She verbalized understanding  The patient should follow up with her primary care provider and with the neurologist to follow up her pending studies and for further evaluation and treatment  Please see above list of diagnoses and related plan for additional information  Condition at Discharge: stable     Discharge Day Visit / Exam:     Subjective:  Patient seen and examined  She states that she would like to go home    She reports persistence of her occipital headache but states that it is improved and she can continue to rest at home  She reports improvement in the strength of her bilateral an and her legs, improved ability to ambulate  She denies new focal deficit  She reports good appetite, denies nausea, vomiting, diarrhea, constipation or abdominal pain  Vitals: Blood Pressure: 101/66 (07/15/18 0715)  Pulse: 76 (07/15/18 0715)  Temperature: 98 4 °F (36 9 °C) (07/15/18 0715)  Temp Source: Temporal (07/15/18 0715)  Respirations: 18 (07/15/18 0715)  Height: 5' 3" (160 cm) (07/12/18 2015)  Weight - Scale: 80 kg (176 lb 5 9 oz) (07/12/18 2015)  SpO2: 100 % (07/15/18 0715)  Exam:     Physical Exam   Constitutional: She is oriented to person, place, and time  No distress  HENT:   Head: Normocephalic and atraumatic  Eyes: Conjunctivae are normal    Neck: No JVD present  Cardiovascular: Normal rate and regular rhythm  No murmur heard  Pulmonary/Chest: Effort normal  No respiratory distress  She has no wheezes  She has no rales  Abdominal: Soft  She exhibits no distension  There is no tenderness  There is no guarding  Musculoskeletal: She exhibits no edema  Neurological: She is alert and oriented to person, place, and time  No cranial nerve deficit  She exhibits normal muscle tone  Coordination normal    Observed ambulating without difficulty  5/5 b/l hand  strength   Skin: Skin is warm and dry  Psychiatric: She has a normal mood and affect  Discussion with Family: Patient, she stated she would update family    Discharge instructions/Information to patient and family:   See after visit summary for information provided to patient and family  Provisions for Follow-Up Care:  See after visit summary for information related to follow-up care and any pertinent home health orders        Disposition:     Home    For Discharges to [facility] SNF:   · Not Applicable to this Patient - Not Applicable to this Patient    Planned Readmission: No     Discharge Statement:  I spent 40 minutes discharging the patient  This time was spent on the day of discharge  I had direct contact with the patient on the day of discharge  Greater than 50% of the total time was spent examining patient, answering all patient questions, arranging and discussing plan of care with patient as well as directly providing post-discharge instructions  Additional time then spent on discharge activities  Discharge Medications:  See after visit summary for reconciled discharge medications provided to patient and family        ** Please Note: This note has been constructed using a voice recognition system **

## 2018-07-15 NOTE — PLAN OF CARE
Problem: Prexisting or High Potential for Compromised Skin Integrity  Goal: Skin integrity is maintained or improved  INTERVENTIONS:  - Identify patients at risk for skin breakdown  - Assess and monitor skin integrity  - Assess and monitor nutrition and hydration status  - Monitor labs (i e  albumin)  - Assess for incontinence   - Turn and reposition patient  - Assist with mobility/ambulation  - Relieve pressure over bony prominences  - Avoid friction and shearing  - Provide appropriate hygiene as needed including keeping skin clean and dry  - Evaluate need for skin moisturizer/barrier cream  - Collaborate with interdisciplinary team (i e  Nutrition, Rehabilitation, etc )   - Patient/family teaching   Outcome: Progressing      Problem: PAIN - ADULT  Goal: Verbalizes/displays adequate comfort level or baseline comfort level  Interventions:  - Encourage patient to monitor pain and request assistance  - Assess pain using appropriate pain scale  - Administer analgesics based on type and severity of pain and evaluate response  - Implement non-pharmacological measures as appropriate and evaluate response  - Consider cultural and social influences on pain and pain management  - Notify physician/advanced practitioner if interventions unsuccessful or patient reports new pain   Outcome: Progressing      Problem: DISCHARGE PLANNING  Goal: Discharge to home or other facility with appropriate resources  INTERVENTIONS:  - Identify barriers to discharge w/patient and caregiver  - Arrange for needed discharge resources and transportation as appropriate  - Identify discharge learning needs (meds, wound care, etc )  - Refer to Case Management Department for coordinating discharge planning if the patient needs post-hospital services based on physician/advanced practitioner order or complex needs related to functional status, cognitive ability, or social support system   Outcome: Progressing      Problem: Knowledge Deficit  Goal: Patient/family/caregiver demonstrates understanding of disease process, treatment plan, medications, and discharge instructions  Complete learning assessment and assess knowledge base  Interventions:  - Provide teaching at level of understanding  - Provide teaching via preferred learning methods   Outcome: Progressing      Problem: NEUROSENSORY - ADULT  Goal: Achieves stable or improved neurological status  INTERVENTIONS  - Monitor and report changes in neurological status  - Initiate measures to prevent increased intracranial pressure  - Maintain blood pressure and fluid volume within ordered parameters to optimize cerebral perfusion  - Monitor temperature, glucose, and sodium or any other associated labs   Initiate appropriate interventions as ordered  - Monitor for seizure activity   - Administer anti-seizure medications as ordered   Outcome: Progressing    Goal: Achieves maximal functionality and self care  INTERVENTIONS  - Monitor swallowing and airway patency with patient fatigue and changes in neurological status  - Encourage and assist patient to increase activity and self care with guidance from rehab services  - Encourage visually impaired, hearing impaired and aphasic patients to use assistive/communication devices   Outcome: Progressing      Problem: METABOLIC, FLUID AND ELECTROLYTES - ADULT  Goal: Electrolytes maintained within normal limits  INTERVENTIONS:  - Monitor labs and assess patient for signs and symptoms of electrolyte imbalances  - Administer electrolyte replacement as ordered  - Monitor response to electrolyte replacements, including repeat lab results as appropriate  - Instruct patient on fluid and nutrition as appropriate   Outcome: Progressing    Goal: Fluid balance maintained  INTERVENTIONS:  - Monitor labs and assess for signs and symptoms of volume excess or deficit  - Monitor I/O and WT  - Instruct patient on fluid and nutrition as appropriate   Outcome: Progressing    Goal: Glucose maintained within target range  INTERVENTIONS:  - Monitor Blood Glucose as ordered  - Assess for signs and symptoms of hyperglycemia and hypoglycemia  - Administer ordered medications to maintain glucose within target range  - Assess nutritional intake and initiate nutrition service referral as needed   Outcome: Progressing      Problem: MUSCULOSKELETAL - ADULT  Goal: Maintain or return mobility to safest level of function  INTERVENTIONS:  - Assess patient's ability to carry out ADLs; assess patient's baseline for ADL function and identify physical deficits which impact ability to perform ADLs (bathing, care of mouth/teeth, toileting, grooming, dressing, etc )  - Assess/evaluate cause of self-care deficits   - Assess range of motion  - Assess patient's mobility; develop plan if impaired  - Assess patient's need for assistive devices and provide as appropriate  - Encourage maximum independence but intervene and supervise when necessary  - Involve family in performance of ADLs  - Assess for home care needs following discharge   - Request OT consult to assist with ADL evaluation and planning for discharge  - Provide patient education as appropriate   Outcome: Progressing    Goal: Maintain proper alignment of affected body part  INTERVENTIONS:  - Support, maintain and protect limb and body alignment  - Provide pt/fam with appropriate education   Outcome: Progressing      Problem: Potential for Falls  Goal: Patient will remain free of falls  INTERVENTIONS:  - Assess patient frequently for physical needs  -  Identify cognitive and physical deficits and behaviors that affect risk of falls    -  [place] fall precautions as indicated by assessment   - Educate patient/family on patient safety including physical limitations  - Instruct patient to call for assistance with activity based on assessment  - Modify environment to reduce risk of injury  - Consider OT/PT consult to assist with strengthening/mobility   Outcome: Progressing      Problem: Nutrition/Hydration-ADULT  Goal: Nutrient/Hydration intake appropriate for improving, restoring or maintaining nutritional needs  Monitor and assess patient's nutrition/hydration status for malnutrition (ex- brittle hair, bruises, dry skin, pale skin and conjunctiva, muscle wasting, smooth red tongue, and disorientation)  Collaborate with interdisciplinary team and initiate plan and interventions as ordered  Monitor patient's weight and dietary intake as ordered or per policy  Utilize nutrition screening tool and intervene per policy  Determine patient's food preferences and provide high-protein, high-caloric foods as appropriate       INTERVENTIONS:  - Monitor oral intake, urinary output, labs, and treatment plans  - Assess nutrition and hydration status and recommend course of action  - Evaluate amount of meals eaten  - Assist patient with eating if necessary   - Allow adequate time for meals  - Recommend/ encourage appropriate diets, oral nutritional supplements, and vitamin/mineral supplements  - Order, calculate, and assess calorie counts as needed  - Recommend, monitor, and adjust tube feedings and TPN/PPN based on assessed needs  - Assess need for intravenous fluids  - Provide specific nutrition/hydration education as appropriate  - Include patient/family/caregiver in decisions related to nutrition   Outcome: Progressing      Problem: DISCHARGE PLANNING - CARE MANAGEMENT  Goal: Discharge to post-acute care or home with appropriate resources  INTERVENTIONS:  - Conduct assessment to determine patient/family and health care team treatment goals, and need for post-acute services based on payer coverage, community resources, and patient preferences, and barriers to discharge  - Address psychosocial, clinical, and financial barriers to discharge as identified in assessment in conjunction with the patient/family and health care team  - Arrange appropriate level of post-acute services according to patients   needs and preference and payer coverage in collaboration with the physician and health care team  - Communicate with and update the patient/family, physician, and health care team regarding progress on the discharge plan  - Arrange appropriate transportation to post-acute venues   Outcome: Progressing

## 2018-07-15 NOTE — ASSESSMENT & PLAN NOTE
· Mostly manifested in bilateral upper extremity weakness of b/l , significantly improved  · Appreciate Neurology input  · CSF studies unremarkable, normal opening pressure, oligoclonal banding and myelin associated glycoprotein pending and can be followed up outpatient  · MRI: neg for acute intracranial pathology  · Continue PT OT will discharge home with outpatient PT/OT based on their recommendations  · Recommended outpatient EMG NCS study and possible nerve muscle biopsy

## 2018-07-15 NOTE — ASSESSMENT & PLAN NOTE
· Secondary to low vit D s/p gastric bypass  · Continue Ca/vit D supplement  · Monitor serum Ca outpatient

## 2018-07-16 LAB
BACTERIA CSF CULT: NO GROWTH
MBP CSF-MCNC: 4.5 NG/ML (ref 0–1.2)

## 2018-07-17 ENCOUNTER — TELEPHONE (OUTPATIENT)
Dept: NEUROLOGY | Facility: CLINIC | Age: 33
End: 2018-07-17

## 2018-07-18 LAB — OLIGOCLONAL BANDS.IT SER+CSF QL: NORMAL

## 2018-07-20 LAB — MISCELLANEOUS LAB TEST RESULT: NORMAL

## 2018-07-21 LAB — SCAN RESULT: NORMAL

## 2018-07-23 ENCOUNTER — TRANSCRIBE ORDERS (OUTPATIENT)
Dept: ADMINISTRATIVE | Facility: HOSPITAL | Age: 33
End: 2018-07-23

## 2018-07-24 LAB — MISCELLANEOUS LAB TEST RESULT: NORMAL

## 2018-07-30 ENCOUNTER — APPOINTMENT (EMERGENCY)
Dept: CT IMAGING | Facility: HOSPITAL | Age: 33
End: 2018-07-30
Payer: COMMERCIAL

## 2018-07-30 ENCOUNTER — HOSPITAL ENCOUNTER (EMERGENCY)
Facility: HOSPITAL | Age: 33
Discharge: HOME | End: 2018-07-30
Attending: EMERGENCY MEDICINE
Payer: COMMERCIAL

## 2018-07-30 VITALS
HEART RATE: 81 BPM | DIASTOLIC BLOOD PRESSURE: 76 MMHG | WEIGHT: 155.87 LBS | RESPIRATION RATE: 18 BRPM | OXYGEN SATURATION: 100 % | TEMPERATURE: 98.2 F | HEIGHT: 63 IN | SYSTOLIC BLOOD PRESSURE: 122 MMHG | BODY MASS INDEX: 27.62 KG/M2

## 2018-07-30 DIAGNOSIS — M54.9 BACK PAIN: Primary | ICD-10-CM

## 2018-07-30 DIAGNOSIS — E87.6 HYPOKALEMIA: ICD-10-CM

## 2018-07-30 LAB
ALBUMIN SERPL BCP-MCNC: 3.8 G/DL (ref 3.5–5)
ALP SERPL-CCNC: 57 U/L (ref 46–116)
ALT SERPL W P-5'-P-CCNC: 30 U/L (ref 12–78)
ANION GAP SERPL CALCULATED.3IONS-SCNC: 12 MMOL/L (ref 4–13)
AST SERPL W P-5'-P-CCNC: 20 U/L (ref 5–45)
BASOPHILS # BLD AUTO: 0.07 THOUSANDS/ΜL (ref 0–0.1)
BASOPHILS NFR BLD AUTO: 1 % (ref 0–1)
BILIRUB SERPL-MCNC: 0.1 MG/DL (ref 0.2–1)
BILIRUB UR QL STRIP: NEGATIVE
BUN SERPL-MCNC: 14 MG/DL (ref 5–25)
CALCIUM SERPL-MCNC: 8.9 MG/DL (ref 8.3–10.1)
CHLORIDE SERPL-SCNC: 101 MMOL/L (ref 100–108)
CLARITY UR: CLEAR
CO2 SERPL-SCNC: 27 MMOL/L (ref 21–32)
COLOR UR: YELLOW
CREAT SERPL-MCNC: 0.89 MG/DL (ref 0.6–1.3)
EOSINOPHIL # BLD AUTO: 0.2 THOUSAND/ΜL (ref 0–0.61)
EOSINOPHIL NFR BLD AUTO: 3 % (ref 0–6)
ERYTHROCYTE [DISTWIDTH] IN BLOOD BY AUTOMATED COUNT: 14.5 % (ref 11.6–15.1)
EXT PREG TEST URINE: NEGATIVE
GFR SERPL CREATININE-BSD FRML MDRD: 85 ML/MIN/1.73SQ M
GLUCOSE SERPL-MCNC: 84 MG/DL (ref 65–140)
GLUCOSE SERPL-MCNC: 97 MG/DL (ref 65–140)
GLUCOSE UR STRIP-MCNC: NEGATIVE MG/DL
HCT VFR BLD AUTO: 38.7 % (ref 34.8–46.1)
HGB BLD-MCNC: 13.2 G/DL (ref 11.5–15.4)
HGB UR QL STRIP.AUTO: NEGATIVE
KETONES UR STRIP-MCNC: NEGATIVE MG/DL
LACTATE SERPL-SCNC: 0.7 MMOL/L (ref 0.5–2)
LEUKOCYTE ESTERASE UR QL STRIP: NEGATIVE
LYMPHOCYTES # BLD AUTO: 2.36 THOUSANDS/ΜL (ref 0.6–4.47)
LYMPHOCYTES NFR BLD AUTO: 33 % (ref 14–44)
MAGNESIUM SERPL-MCNC: 2.1 MG/DL (ref 1.6–2.6)
MCH RBC QN AUTO: 30.5 PG (ref 26.8–34.3)
MCHC RBC AUTO-ENTMCNC: 34.1 G/DL (ref 31.4–37.4)
MCV RBC AUTO: 89 FL (ref 82–98)
MONOCYTES # BLD AUTO: 0.63 THOUSAND/ΜL (ref 0.17–1.22)
MONOCYTES NFR BLD AUTO: 9 % (ref 4–12)
NEUTROPHILS # BLD AUTO: 3.96 THOUSANDS/ΜL (ref 1.85–7.62)
NEUTS SEG NFR BLD AUTO: 55 % (ref 43–75)
NITRITE UR QL STRIP: NEGATIVE
PH UR STRIP.AUTO: 6.5 [PH] (ref 4.5–8)
PHOSPHATE SERPL-MCNC: 4.3 MG/DL (ref 2.7–4.5)
PLATELET # BLD AUTO: 378 THOUSANDS/UL (ref 149–390)
PMV BLD AUTO: 10.4 FL (ref 8.9–12.7)
POTASSIUM SERPL-SCNC: 3 MMOL/L (ref 3.5–5.3)
PROT SERPL-MCNC: 7.9 G/DL (ref 6.4–8.2)
PROT UR STRIP-MCNC: NEGATIVE MG/DL
RBC # BLD AUTO: 4.33 MILLION/UL (ref 3.81–5.12)
SODIUM SERPL-SCNC: 140 MMOL/L (ref 136–145)
SP GR UR STRIP.AUTO: 1.02 (ref 1–1.03)
UROBILINOGEN UR QL STRIP.AUTO: 0.2 E.U./DL
WBC # BLD AUTO: 7.22 THOUSAND/UL (ref 4.31–10.16)

## 2018-07-30 PROCEDURE — 80053 COMPREHEN METABOLIC PANEL: CPT | Performed by: EMERGENCY MEDICINE

## 2018-07-30 PROCEDURE — 81003 URINALYSIS AUTO W/O SCOPE: CPT | Performed by: EMERGENCY MEDICINE

## 2018-07-30 PROCEDURE — 85025 COMPLETE CBC W/AUTO DIFF WBC: CPT | Performed by: EMERGENCY MEDICINE

## 2018-07-30 PROCEDURE — 81025 URINE PREGNANCY TEST: CPT | Performed by: EMERGENCY MEDICINE

## 2018-07-30 PROCEDURE — 84100 ASSAY OF PHOSPHORUS: CPT | Performed by: EMERGENCY MEDICINE

## 2018-07-30 PROCEDURE — 83605 ASSAY OF LACTIC ACID: CPT | Performed by: EMERGENCY MEDICINE

## 2018-07-30 PROCEDURE — 36415 COLL VENOUS BLD VENIPUNCTURE: CPT | Performed by: EMERGENCY MEDICINE

## 2018-07-30 PROCEDURE — 96361 HYDRATE IV INFUSION ADD-ON: CPT

## 2018-07-30 PROCEDURE — 74177 CT ABD & PELVIS W/CONTRAST: CPT

## 2018-07-30 PROCEDURE — 83735 ASSAY OF MAGNESIUM: CPT | Performed by: EMERGENCY MEDICINE

## 2018-07-30 PROCEDURE — 99284 EMERGENCY DEPT VISIT MOD MDM: CPT

## 2018-07-30 PROCEDURE — 82948 REAGENT STRIP/BLOOD GLUCOSE: CPT

## 2018-07-30 PROCEDURE — 96374 THER/PROPH/DIAG INJ IV PUSH: CPT

## 2018-07-30 RX ORDER — METHOCARBAMOL 500 MG/1
500 TABLET, FILM COATED ORAL ONCE
Status: COMPLETED | OUTPATIENT
Start: 2018-07-30 | End: 2018-07-30

## 2018-07-30 RX ORDER — POTASSIUM CHLORIDE 20 MEQ/1
40 TABLET, EXTENDED RELEASE ORAL ONCE
Status: COMPLETED | OUTPATIENT
Start: 2018-07-30 | End: 2018-07-30

## 2018-07-30 RX ORDER — FENTANYL CITRATE 50 UG/ML
50 INJECTION, SOLUTION INTRAMUSCULAR; INTRAVENOUS ONCE
Status: COMPLETED | OUTPATIENT
Start: 2018-07-30 | End: 2018-07-30

## 2018-07-30 RX ADMIN — IOHEXOL 100 ML: 350 INJECTION, SOLUTION INTRAVENOUS at 20:46

## 2018-07-30 RX ADMIN — POTASSIUM CHLORIDE 40 MEQ: 1500 TABLET, EXTENDED RELEASE ORAL at 19:58

## 2018-07-30 RX ADMIN — SODIUM CHLORIDE 1000 ML: 0.9 INJECTION, SOLUTION INTRAVENOUS at 19:16

## 2018-07-30 RX ADMIN — METHOCARBAMOL 500 MG: 500 TABLET ORAL at 19:58

## 2018-07-30 RX ADMIN — FENTANYL CITRATE 50 MCG: 50 INJECTION, SOLUTION INTRAMUSCULAR; INTRAVENOUS at 19:16

## 2018-07-30 NOTE — ED PROVIDER NOTES
History  Chief Complaint   Patient presents with    Back Pain     middle right sided back pain started thursday and switched to the left side  States she has been running to the bathroom often but nothing is coming out  No burning while peeing just pain in her back     Patient is a 27-year-old female with a history of hypokalemia possibly due from renal tubular acidosis(per patient there still in the process of working this up) coming in today with several days of nontraumatic lower back discomfort  Patient states it is a throbbing sensation going from right to left and left to right  It does not radiate into her lower extremities or pelvis  She has no radicular symptoms  There is no urinary retention  She has no fevers, chills, abdominal pain  She has no nauseousness or vomiting  She does however have decreased p o  intake  She does state of urinary urgency frequency but no dysuria  No hematuria  No melena or bright red blood per rectum          History provided by:  Patient   used: No    Back Pain   Location:  Lumbar spine  Quality:  Burning (Throbbing)  Radiates to:  Does not radiate  Pain severity:  Moderate  Pain is:  Unable to specify  Onset quality:  Gradual  Duration:  3 days  Timing:  Constant  Progression:  Waxing and waning  Chronicity:  New  Context: not emotional stress, not falling, not jumping from heights, not lifting heavy objects, not MCA, not MVA, not occupational injury, not pedestrian accident, not physical stress, not recent illness, not recent injury and not twisting    Relieved by:  Nothing  Worsened by:  Nothing  Ineffective treatments:  OTC medications and NSAIDs  Associated symptoms: abdominal pain    Associated symptoms: no abdominal swelling, no bladder incontinence, no bowel incontinence, no chest pain, no dysuria, no fever, no headaches, no leg pain, no numbness, no paresthesias, no pelvic pain, no perianal numbness, no tingling, no weakness and no weight loss    Risk factors: no hx of cancer, no hx of osteoporosis, no lack of exercise, no menopause, not obese, not pregnant, no recent surgery, no steroid use and no vascular disease        Prior to Admission Medications   Prescriptions Last Dose Informant Patient Reported? Taking? Multiple Vitamins-Minerals (MULTIVITAMIN WITH MINERALS) tablet   No Yes   Sig: Take 1 tablet by mouth daily   amitriptyline (ELAVIL) 100 mg tablet   Yes Yes   Sig: Take 25 mg by mouth daily at bedtime   butalbital-acetaminophen-caffeine (FIORICET,ESGIC) -40 mg per tablet   No Yes   Sig: Take 1 tablet by mouth every 4 (four) hours as needed for headaches   calcium carbonate-vitamin D (OSCAL-D) 500 mg-200 units per tablet   No Yes   Sig: Take 1 tablet by mouth 2 (two) times a day with meals   cholecalciferol (VITAMIN D3) 1,000 units tablet   No Yes   Sig: Take 1 tablet (1,000 Units total) by mouth daily   cyanocobalamin 500 MCG tablet   No Yes   Sig: Take 1 tablet (500 mcg total) by mouth daily   meclizine (ANTIVERT) 25 mg tablet   No Yes   Sig: Take 1 tablet (25 mg total) by mouth every 12 (twelve) hours as needed for dizziness   potassium chloride (K-DUR,KLOR-CON) 20 mEq tablet   No Yes   Sig: Take 1 tablet (20 mEq total) by mouth daily   topiramate (TOPAMAX) 50 MG tablet   Yes Yes   Sig: Take 25 mg by mouth every 12 (twelve) hours      Facility-Administered Medications: None       History reviewed  No pertinent past medical history  Past Surgical History:   Procedure Laterality Date    ABDOMINAL SURGERY      APPENDECTOMY      CHOLECYSTECTOMY      GASTRIC BYPASS      HYSTERECTOMY         History reviewed  No pertinent family history  I have reviewed and agree with the history as documented      Social History   Substance Use Topics    Smoking status: Never Smoker    Smokeless tobacco: Never Used    Alcohol use Yes      Comment: socially        Review of Systems   Constitutional: Negative for diaphoresis, fever and weight loss    HENT: Negative for ear pain and sore throat  Eyes: Negative for visual disturbance  Respiratory: Negative for chest tightness and shortness of breath  Cardiovascular: Negative for chest pain and palpitations  Gastrointestinal: Positive for abdominal pain  Negative for bowel incontinence, nausea and vomiting  Genitourinary: Negative for bladder incontinence, difficulty urinating, dysuria and pelvic pain  Musculoskeletal: Positive for back pain  Negative for neck pain  Skin: Negative for rash  Neurological: Negative for tingling, weakness, numbness, headaches and paresthesias  Psychiatric/Behavioral: Negative for confusion  All other systems reviewed and are negative  Physical Exam  Physical Exam   Constitutional: She is oriented to person, place, and time  She appears well-developed and well-nourished  No distress  HENT:   Head: Normocephalic and atraumatic  Mouth/Throat: Uvula is midline, oropharynx is clear and moist and mucous membranes are normal  No uvula swelling  No oropharyngeal exudate  Eyes: Conjunctivae and EOM are normal  Pupils are equal, round, and reactive to light  Neck: Normal range of motion  Neck supple  Cardiovascular: Normal rate, regular rhythm, normal heart sounds and intact distal pulses  No murmur heard  Pulmonary/Chest: Effort normal and breath sounds normal  No stridor  No respiratory distress  Abdominal: Soft  Bowel sounds are normal  She exhibits no distension  There is generalized tenderness  There is CVA tenderness (Bilateral   No rashes or lesions  )  Musculoskeletal: Normal range of motion  She exhibits no edema  Lumbar back: She exhibits spasm  She exhibits normal range of motion, no tenderness, no bony tenderness, no swelling, no edema, no deformity, no laceration, no pain and normal pulse  Neurological: She is alert and oriented to person, place, and time  No cranial nerve deficit  Skin: Skin is warm   Capillary refill takes less than 2 seconds  She is not diaphoretic  Nursing note and vitals reviewed  Vital Signs  ED Triage Vitals [07/30/18 1836]   Temperature Pulse Respirations Blood Pressure SpO2   98 2 °F (36 8 °C) 85 16 118/71 100 %      Temp Source Heart Rate Source Patient Position - Orthostatic VS BP Location FiO2 (%)   Temporal Monitor Sitting Left arm --      Pain Score       8           Vitals:    07/30/18 1915 07/30/18 1930 07/30/18 2000 07/30/18 2030   BP: 120/73 129/64 135/63 122/76   Pulse: 82 74 91 76   Patient Position - Orthostatic VS: Sitting Lying Sitting Sitting       Visual Acuity  Visual Acuity      Most Recent Value   L Pupil Size (mm)  3   R Pupil Size (mm)  3          ED Medications  Medications   sodium chloride 0 9 % bolus 1,000 mL (1,000 mL Intravenous New Bag 7/30/18 1916)   fentanyl citrate (PF) 100 MCG/2ML 50 mcg (50 mcg Intravenous Given 7/30/18 1916)   potassium chloride (K-DUR,KLOR-CON) CR tablet 40 mEq (40 mEq Oral Given 7/30/18 1958)   methocarbamol (ROBAXIN) tablet 500 mg (500 mg Oral Given 7/30/18 1958)   iohexol (OMNIPAQUE) 350 MG/ML injection (MULTI-DOSE) 100 mL (100 mL Intravenous Given 7/30/18 2046)       Diagnostic Studies  Results Reviewed     Procedure Component Value Units Date/Time    Lactic acid, plasma [[id number]]  (Normal) Collected:  07/30/18 1911    Lab Status:  Final result Specimen:  Blood from Arm, Right Updated:  07/30/18 1940     LACTIC ACID 0 7 mmol/L     Narrative:         Result may be elevated if tourniquet was used during collection      Comprehensive metabolic panel [[id number]]  (Abnormal) Collected:  07/30/18 1911    Lab Status:  Final result Specimen:  Blood from Arm, Right Updated:  07/30/18 1937     Sodium 140 mmol/L      Potassium 3 0 (L) mmol/L      Chloride 101 mmol/L      CO2 27 mmol/L      Anion Gap 12 mmol/L      BUN 14 mg/dL      Creatinine 0 89 mg/dL      Glucose 84 mg/dL      Calcium 8 9 mg/dL      AST 20 U/L      ALT 30 U/L      Alkaline Phosphatase 57 U/L      Total Protein 7 9 g/dL      Albumin 3 8 g/dL      Total Bilirubin 0 10 (L) mg/dL      eGFR 85 ml/min/1 73sq m     Narrative:         National Kidney Disease Education Program recommendations are as follows:  GFR calculation is accurate only with a steady state creatinine  Chronic Kidney disease less than 60 ml/min/1 73 sq  meters  Kidney failure less than 15 ml/min/1 73 sq  meters      Magnesium [[id number]]  (Normal) Collected:  07/30/18 1911    Lab Status:  Final result Specimen:  Blood from Arm, Right Updated:  07/30/18 1937     Magnesium 2 1 mg/dL     Phosphorus [[id number]]  (Normal) Collected:  07/30/18 1911    Lab Status:  Final result Specimen:  Blood from Arm, Right Updated:  07/30/18 1937     Phosphorus 4 3 mg/dL     UA w Reflex to Microscopic w Reflex to Culture [[id number]] Collected:  07/30/18 1911    Lab Status:  Final result Specimen:  Urine from Urine, Clean Catch Updated:  07/30/18 1922     Color, UA Yellow     Clarity, UA Clear     Specific [place], UA 1 020     pH, UA 6 5     Leukocytes, UA Negative     Nitrite, UA Negative     Protein, UA Negative mg/dl      Glucose, UA Negative mg/dl      Ketones, UA Negative mg/dl      Urobilinogen, UA 0 2 E U /dl      Bilirubin, UA Negative     Blood, UA Negative    CBC and differential [[id number]]  (Normal) Collected:  07/30/18 1911    Lab Status:  Final result Specimen:  Blood from Arm, Right Updated:  07/30/18 1920     WBC 7 22 Thousand/uL      RBC 4 33 Million/uL      Hemoglobin 13 2 g/dL      Hematocrit 38 7 %      MCV 89 fL      MCH 30 5 pg      MCHC 34 1 g/dL      RDW 14 5 %      MPV 10 4 fL      Platelets 539 Thousands/uL      Neutrophils Relative 55 %      Lymphocytes Relative 33 %      Monocytes Relative 9 %      Eosinophils Relative 3 %      Basophils Relative 1 %      Neutrophils Absolute 3 96 Thousands/µL      Lymphocytes Absolute 2 36 Thousands/µL      Monocytes Absolute 0 63 Thousand/µL      Eosinophils Absolute 0 20 Thousand/µL Basophils Absolute 0 07 Thousands/µL     POCT pregnancy, urine [[id number]]  (Normal) Resulted:  07/30/18 1911    Lab Status:  Final result Updated:  07/30/18 1911     EXT PREG TEST UR (Ref: Negative) negative    Fingerstick Glucose (POCT) [[id number]]  (Normal) Collected:  07/30/18 1908    Lab Status:  Final result Updated:  07/30/18 1909     POC Glucose 97 mg/dl                  CT abdomen pelvis with contrast   Final Result by Enid Hernandez MD (07/30 2056)      Liquid stool noted throughout the colon suggestive of a gastroenteritis/diarrhea  No free air or free fluid  No hydronephrosis  Postoperative changes of prior gastric bypass surgery, cholecystectomy, appendectomy and hysterectomy              Workstation performed: [id number]                    Procedures  Procedures       Phone Contacts  ED Phone Contact    ED Course               PERC Rule for PE      Most Recent Value   PERC Rule for PE   Age >=50  0 Filed at: 07/30/2018 1930   HR >=100  0 Filed at: 07/30/2018 1930   O2 Sat on room air < 95%  0 Filed at: 07/30/2018 1930   History of PE or DVT  0 Filed at: 07/30/2018 1930   Recent trauma or surgery  0 Filed at: 07/30/2018 1930   Hemoptysis  0 Filed at: 07/30/2018 1930   Exogenous estrogen  0 Filed at: 07/30/2018 1930   Unilateral leg swelling  0 Filed at: 07/30/2018 1930   PERC Rule for PE Results  0 Filed at: 07/30/2018 1930                Wells' Criteria for PE      Most Recent Value   Wells' Criteria for PE   Clinical signs and symptoms of DVT  0 Filed at: 07/30/2018 1931   PE is primary diagnosis or equally likely  0 Filed at: 07/30/2018 1931   HR >100  0 Filed at: 07/30/2018 1931   Immobilization at least 3 days or Surgery in the previous 4 weeks  0 Filed at: 07/30/2018 1931   Previous, objectively diagnosed PE or DVT  0 Filed at: 07/30/2018 1931   Hemoptysis  0 Filed at: 07/30/2018 1931   Malignancy with treatment within 6 months or palliative  0 Filed at: 07/30/2018 1931   [place] Criteria Total  0 Filed at: 07/30/2018 1931            MDM  Number of Diagnoses or Management Options  Back pain:   Hypokalemia:   Diagnosis management comments: 7:46 PM  Patient updated on labs  Potassium is 3 0  She states that she has been taking her potassium supplementation  Her lowest per patient was less than 2  Will replace orally  Patient updated on the rest of her labs  Will continue with CT abdomen pelvis which she is agreeable and discussed with her no evidence of sepsis criteria at this time  9:14 PM  Patient reacted on labs and urine  She states the medications really did help her symptoms  CT without acute pathology  Patient made aware of stool throughout the colon concerning for early enteritis versus gastroenteritis  Patient has no vomiting or diarrhea at this time  Discussed with her signs symptoms that may start  Also discussed with her regarding a brat diet  Return to ER instructionsGiven  Also discussed with her need to follow up with Nephrology and PCP  Portions of the record may have been created with voice recognition software  Occasional wrong word or "sound a like" substitutions may have occurred due to the inherent limitations of voice recognition software  Read the chart carefully and recognize, using context, where substitutions have occurred  Portions of the record may have been created with voice recognition software  Occasional wrong word or "sound a like" substitutions may have occurred due to the inherent limitations of voice recognition software  Read the chart carefully and recognize, using context, where substitutions have occurred           Amount and/or Complexity of Data Reviewed  Clinical lab tests: ordered and reviewed  Tests in the radiology section of CPT®: ordered and reviewed  Tests in the medicine section of CPT®: ordered and reviewed  Independent visualization of images, tracings, or specimens: yes      Mahi Time    Disposition  Final diagnoses:   Back pain   Hypokalemia     Time reflects when diagnosis was documented in both MDM as applicable and the Disposition within this note     Time User Action Codes Description Comment    7/30/2018  7:51 PM Camden Casey Add [M54 9] Back pain     7/30/2018  7:51 PM Jacinto, [address] [place] [address] [E87 6] Hypokalemia       ED Disposition     None      Follow-up Information    None         Patient's Medications   Discharge Prescriptions    No medications on file     No discharge procedures on file      ED Provider  Electronically Signed by           [address] Valenzuela [facility], DO  07/30/18 4998

## 2018-08-01 ENCOUNTER — TRANSCRIBE ORDERS (OUTPATIENT)
Dept: RADIOLOGY | Facility: MEDICAL CENTER | Age: 33
End: 2018-08-01

## 2018-08-01 DIAGNOSIS — E87.6 HYPOPOTASSEMIA: Primary | ICD-10-CM

## 2018-08-13 ENCOUNTER — HOSPITAL ENCOUNTER (EMERGENCY)
Facility: HOSPITAL | Age: 33
Discharge: HOME | End: 2018-08-13
Attending: EMERGENCY MEDICINE | Admitting: EMERGENCY MEDICINE
Payer: COMMERCIAL

## 2018-08-13 VITALS
DIASTOLIC BLOOD PRESSURE: 58 MMHG | OXYGEN SATURATION: 100 % | RESPIRATION RATE: 16 BRPM | SYSTOLIC BLOOD PRESSURE: 120 MMHG | HEART RATE: 80 BPM | WEIGHT: 155.87 LBS | TEMPERATURE: 97.3 F | BODY MASS INDEX: 27.61 KG/M2

## 2018-08-13 DIAGNOSIS — E87.6 HYPOKALEMIA: Primary | ICD-10-CM

## 2018-08-13 LAB
ALBUMIN SERPL BCP-MCNC: 3.3 G/DL (ref 3.5–5)
ALP SERPL-CCNC: 62 U/L (ref 46–116)
ALT SERPL W P-5'-P-CCNC: 27 U/L (ref 12–78)
ANION GAP SERPL CALCULATED.3IONS-SCNC: 6 MMOL/L (ref 4–13)
AST SERPL W P-5'-P-CCNC: 39 U/L (ref 5–45)
BASOPHILS # BLD AUTO: 0.06 THOUSANDS/ΜL (ref 0–0.1)
BASOPHILS NFR BLD AUTO: 1 % (ref 0–1)
BILIRUB SERPL-MCNC: 0.2 MG/DL (ref 0.2–1)
BUN SERPL-MCNC: 15 MG/DL (ref 5–25)
CALCIUM SERPL-MCNC: 8.4 MG/DL (ref 8.3–10.1)
CHLORIDE SERPL-SCNC: 100 MMOL/L (ref 100–108)
CO2 SERPL-SCNC: 29 MMOL/L (ref 21–32)
CREAT SERPL-MCNC: 0.93 MG/DL (ref 0.6–1.3)
EOSINOPHIL # BLD AUTO: 0.17 THOUSAND/ΜL (ref 0–0.61)
EOSINOPHIL NFR BLD AUTO: 2 % (ref 0–6)
ERYTHROCYTE [DISTWIDTH] IN BLOOD BY AUTOMATED COUNT: 14.3 % (ref 11.6–15.1)
GFR SERPL CREATININE-BSD FRML MDRD: 80 ML/MIN/1.73SQ M
GLUCOSE SERPL-MCNC: 48 MG/DL (ref 65–140)
HCT VFR BLD AUTO: 39.5 % (ref 34.8–46.1)
HGB BLD-MCNC: 13.6 G/DL (ref 11.5–15.4)
LYMPHOCYTES # BLD AUTO: 2.46 THOUSANDS/ΜL (ref 0.6–4.47)
LYMPHOCYTES NFR BLD AUTO: 29 % (ref 14–44)
MAGNESIUM SERPL-MCNC: 2.3 MG/DL (ref 1.6–2.6)
MCH RBC QN AUTO: 30.6 PG (ref 26.8–34.3)
MCHC RBC AUTO-ENTMCNC: 34.4 G/DL (ref 31.4–37.4)
MCV RBC AUTO: 89 FL (ref 82–98)
MONOCYTES # BLD AUTO: 0.6 THOUSAND/ΜL (ref 0.17–1.22)
MONOCYTES NFR BLD AUTO: 7 % (ref 4–12)
NEUTROPHILS # BLD AUTO: 5.35 THOUSANDS/ΜL (ref 1.85–7.62)
NEUTS SEG NFR BLD AUTO: 62 % (ref 43–75)
PLATELET # BLD AUTO: 335 THOUSANDS/UL (ref 149–390)
PMV BLD AUTO: 11.3 FL (ref 8.9–12.7)
POTASSIUM SERPL-SCNC: 2.6 MMOL/L (ref 3.5–5.3)
PROT SERPL-MCNC: 7.3 G/DL (ref 6.4–8.2)
RBC # BLD AUTO: 4.44 MILLION/UL (ref 3.81–5.12)
SODIUM SERPL-SCNC: 135 MMOL/L (ref 136–145)
TROPONIN I SERPL-MCNC: <0.02 NG/ML
TSH SERPL DL<=0.05 MIU/L-ACNC: 1.6 UIU/ML (ref 0.36–3.74)
WBC # BLD AUTO: 8.64 THOUSAND/UL (ref 4.31–10.16)

## 2018-08-13 PROCEDURE — 93005 ELECTROCARDIOGRAM TRACING: CPT

## 2018-08-13 PROCEDURE — 36415 COLL VENOUS BLD VENIPUNCTURE: CPT | Performed by: PHYSICIAN ASSISTANT

## 2018-08-13 PROCEDURE — 96366 THER/PROPH/DIAG IV INF ADDON: CPT

## 2018-08-13 PROCEDURE — 99284 EMERGENCY DEPT VISIT MOD MDM: CPT

## 2018-08-13 PROCEDURE — 85025 COMPLETE CBC W/AUTO DIFF WBC: CPT | Performed by: PHYSICIAN ASSISTANT

## 2018-08-13 PROCEDURE — 84443 ASSAY THYROID STIM HORMONE: CPT | Performed by: PHYSICIAN ASSISTANT

## 2018-08-13 PROCEDURE — 84484 ASSAY OF TROPONIN QUANT: CPT | Performed by: EMERGENCY MEDICINE

## 2018-08-13 PROCEDURE — 96365 THER/PROPH/DIAG IV INF INIT: CPT

## 2018-08-13 PROCEDURE — 83735 ASSAY OF MAGNESIUM: CPT | Performed by: PHYSICIAN ASSISTANT

## 2018-08-13 PROCEDURE — 80053 COMPREHEN METABOLIC PANEL: CPT | Performed by: PHYSICIAN ASSISTANT

## 2018-08-13 RX ORDER — POTASSIUM CHLORIDE 14.9 MG/ML
20 INJECTION INTRAVENOUS ONCE
Status: COMPLETED | OUTPATIENT
Start: 2018-08-13 | End: 2018-08-13

## 2018-08-13 RX ORDER — POTASSIUM CHLORIDE 20 MEQ/1
40 TABLET, EXTENDED RELEASE ORAL ONCE
Status: COMPLETED | OUTPATIENT
Start: 2018-08-13 | End: 2018-08-13

## 2018-08-13 RX ADMIN — POTASSIUM CHLORIDE 40 MEQ: 1500 TABLET, EXTENDED RELEASE ORAL at 16:58

## 2018-08-13 RX ADMIN — POTASSIUM CHLORIDE 20 MEQ: 200 INJECTION, SOLUTION INTRAVENOUS at 16:57

## 2018-08-13 RX ADMIN — SODIUM CHLORIDE 1000 ML: 0.9 INJECTION, SOLUTION INTRAVENOUS at 16:57

## 2018-08-13 NOTE — ED PROCEDURE NOTE
Procedure  ECG 12 Lead Documentation  Date/Time: 8/13/2018 4:55 PM  Performed by: Natalia Kwon  Authorized by: Natalia Kwon     Indications / Diagnosis:  Numbness  ECG reviewed by me, the ED Provider: yes    Patient location:  ED  Previous ECG:     Comparison to cardiac monitor: Yes    Interpretation:     Interpretation: normal    Rate:     ECG rate:  79    ECG rate assessment: normal    Rhythm:     Rhythm: sinus rhythm    Ectopy:     Ectopy: none    QRS:     QRS axis:  Normal    QRS intervals:  Normal  Conduction:     Conduction: normal    ST segments:     ST segments:  Normal  T waves:     T waves: normal                       Annmarie Perry PA-C  08/13/18 1656

## 2018-08-13 NOTE — ED PROVIDER NOTES
History  Chief Complaint   Patient presents with    Numbness     right arm cramping started on friday with numbness then today left arm started with numbness  the last time this happened her potassium was low     Patient presents to the emergency department today for evaluation of bilateral hand numbness  Patient believes her potassium level is low  She states initially she had symptoms that commenced 3 days ago where she noted some bilateral pain and weakness of the upper extremities in the region of the bicep region  She states the numbness and tingling began earlier today started the right-sided however now is in the left side  She denies headache blurred vision  No chest pain shortness of breath  No abdominal pain nausea or vomiting  There is no leg pain or leg edema  Prior to Admission Medications   Prescriptions Last Dose Informant Patient Reported? Taking?    Multiple Vitamins-Minerals (MULTIVITAMIN WITH MINERALS) tablet   No Yes   Sig: Take 1 tablet by mouth daily   amitriptyline (ELAVIL) 100 mg tablet   Yes Yes   Sig: Take 25 mg by mouth daily at bedtime   butalbital-acetaminophen-caffeine (FIORICET,ESGIC) -40 mg per tablet   No Yes   Sig: Take 1 tablet by mouth every 4 (four) hours as needed for headaches   calcium carbonate-vitamin D (OSCAL-D) 500 mg-200 units per tablet   No Yes   Sig: Take 1 tablet by mouth 2 (two) times a day with meals   cholecalciferol (VITAMIN D3) 1,000 units tablet   No Yes   Sig: Take 1 tablet (1,000 Units total) by mouth daily   cyanocobalamin 500 MCG tablet   No Yes   Sig: Take 1 tablet (500 mcg total) by mouth daily   meclizine (ANTIVERT) 25 mg tablet   No Yes   Sig: Take 1 tablet (25 mg total) by mouth every 12 (twelve) hours as needed for dizziness   potassium chloride (K-DUR,KLOR-CON) 20 mEq tablet   No Yes   Sig: Take 1 tablet (20 mEq total) by mouth daily   topiramate (TOPAMAX) 50 MG tablet   Yes Yes   Sig: Take 25 mg by mouth every 12 (twelve) hours      Facility-Administered Medications: None       History reviewed  No pertinent past medical history  Past Surgical History:   Procedure Laterality Date    ABDOMINAL SURGERY      APPENDECTOMY      CHOLECYSTECTOMY      GASTRIC BYPASS      HYSTERECTOMY         History reviewed  No pertinent family history  I have reviewed and agree with the history as documented  Social History   Substance Use Topics    Smoking status: Never Smoker    Smokeless tobacco: Never Used    Alcohol use Yes      Comment: socially        Review of Systems   Constitutional: Negative  HENT: Negative  Eyes: Negative  Respiratory: Negative  Cardiovascular: Negative  Gastrointestinal: Negative  Endocrine: Negative  Genitourinary: Negative  Musculoskeletal: Negative  Skin: Negative  Allergic/Immunologic: Negative  Neurological: Positive for numbness  Negative for dizziness, tremors, seizures, syncope, facial asymmetry, speech difficulty, weakness, light-headedness and headaches  Hematological: Negative  Psychiatric/Behavioral: Negative  All other systems reviewed and are negative  Physical Exam  Physical Exam   Constitutional: She is oriented to person, place, and time  She appears well-developed and well-nourished  No distress  HENT:   Head: Normocephalic and atraumatic  Right Ear: External ear normal    Left Ear: External ear normal    Nose: Nose normal    Mouth/Throat: Oropharynx is clear and moist  No oropharyngeal exudate  Eyes: Conjunctivae and EOM are normal  Pupils are equal, round, and reactive to light  Neck: Normal range of motion  Cardiovascular: Normal rate, regular rhythm, normal heart sounds and intact distal pulses  Exam reveals no gallop and no friction rub  No murmur heard  Pulmonary/Chest: Effort normal and breath sounds normal  No respiratory distress  She has no wheezes  She has no rales  She exhibits no tenderness  Abdominal: Soft   Bowel sounds are normal  She exhibits no distension and no mass  There is no tenderness  There is no rebound and no guarding  No hernia  Musculoskeletal: Normal range of motion  Neurological: She is alert and oriented to person, place, and time  Skin: Skin is warm  Capillary refill takes less than 2 seconds  She is not diaphoretic  Psychiatric: She has a normal mood and affect  Vitals reviewed        Vital Signs  ED Triage Vitals [08/13/18 1525]   Temperature Pulse Respirations Blood Pressure SpO2   (!) 97 3 °F (36 3 °C) 94 18 141/65 100 %      Temp Source Heart Rate Source Patient Position - Orthostatic VS BP Location FiO2 (%)   Temporal Left Sitting Left arm --      Pain Score       6           Vitals:    08/13/18 1525 08/13/18 1733   BP: 141/65 120/58   Pulse: 94 80   Patient Position - Orthostatic VS: Sitting Lying       Visual Acuity  Visual Acuity      Most Recent Value   L Pupil Size (mm)  3   R Pupil Size (mm)  3          ED Medications  Medications   potassium chloride 20 mEq IVPB (premix) (20 mEq Intravenous New Bag 8/13/18 1657)   potassium chloride (K-DUR,KLOR-CON) CR tablet 40 mEq (40 mEq Oral Given 8/13/18 1658)   sodium chloride 0 9 % bolus 1,000 mL (1,000 mL Intravenous New Bag 8/13/18 1657)       Diagnostic Studies  Results Reviewed     Procedure Component Value Units Date/Time    Troponin I [[id number]]  (Normal) Collected:  08/13/18 1635    Lab Status:  Final result Specimen:  Blood from Arm, Right Updated:  08/13/18 1656     Troponin I <0 02 ng/mL     Comprehensive metabolic panel [[id number]]  (Abnormal) Collected:  08/13/18 1559    Lab Status:  Final result Specimen:  Blood from Arm, Right Updated:  08/13/18 1631     Sodium 135 (L) mmol/L      Potassium 2 6 (LL) mmol/L      Chloride 100 mmol/L      CO2 29 mmol/L      Anion Gap 6 mmol/L      BUN 15 mg/dL      Creatinine 0 93 mg/dL      Glucose 48 (L) mg/dL      Calcium 8 4 mg/dL      AST 39 U/L      ALT 27 U/L      Alkaline Phosphatase 62 U/L Total Protein 7 3 g/dL      Albumin 3 3 (L) g/dL      Total Bilirubin 0 20 mg/dL      eGFR 80 ml/min/1 73sq m     Narrative:         National Kidney Disease Education Program recommendations are as follows:  GFR calculation is accurate only with a steady state creatinine  Chronic Kidney disease less than 60 ml/min/1 73 sq  meters  Kidney failure less than 15 ml/min/1 73 sq  meters  TSH [[id number]]  (Normal) Collected:  08/13/18 1559    Lab Status:  Final result Specimen:  Blood from Arm, Right Updated:  08/13/18 1627     TSH 3RD GENERATON 1 599 uIU/mL     Narrative:         Patients undergoing fluorescein dye angiography may retain small amounts of fluorescein in the body for 48-72 hours post procedure  Samples containing fluorescein can produce falsely depressed TSH values  If the patient had this procedure,a specimen should be resubmitted post fluorescein clearance            The recommended reference ranges for TSH during pregnancy are as follows:  First trimester 0 1 to 2 5 uIU/mL  Second trimester  0 2 to 3 0 uIU/mL  Third trimester 0 3 to 3 0 uIU/m      Magnesium [[id number]]  (Normal) Collected:  08/13/18 1559    Lab Status:  Final result Specimen:  Blood from Arm, Right Updated:  08/13/18 1627     Magnesium 2 3 mg/dL     CBC and differential [[id number]]  (Normal) Collected:  08/13/18 1559    Lab Status:  Final result Specimen:  Blood from Arm, Right Updated:  08/13/18 1604     WBC 8 64 Thousand/uL      RBC 4 44 Million/uL      Hemoglobin 13 6 g/dL      Hematocrit 39 5 %      MCV 89 fL      MCH 30 6 pg      MCHC 34 4 g/dL      RDW 14 3 %      MPV 11 3 fL      Platelets 461 Thousands/uL      Neutrophils Relative 62 %      Lymphocytes Relative 29 %      Monocytes Relative 7 %      Eosinophils Relative 2 %      Basophils Relative 1 %      Neutrophils Absolute 5 35 Thousands/µL      Lymphocytes Absolute 2 46 Thousands/µL      Monocytes Absolute 0 60 Thousand/µL      Eosinophils Absolute 0 17 Thousand/µL Basophils Absolute 0 06 Thousands/µL                  No orders to display              Procedures  Procedures       Phone Contacts  ED Phone Contact    ED Course  ED Course as of Aug 13 1855   Mon Aug 13, 2018   1543 Blood Pressure: 141/65   1543 Temperature: (!) 97 3 °F (36 3 °C)   1543 Pulse: 94   1543 Respirations: 18   1543 SpO2: 100 %   1654 Magnesium: 2 3   1654 TSH 3RD GENERATON: 1 599   1654 WBC: 8 64   1654 Hemoglobin: 13 6   1654 Platelets: 209   5203 Sodium: (!) 135   1654 eGFR: 80   1654 Creatinine: 0 93   1654 Potassium: (!!) 2 6         HEART Risk Score      Most Recent Value   History  0 Filed at: 08/13/2018 1657   ECG  0 Filed at: 08/13/2018 1657   Age  0 Filed at: 08/13/2018 1657   Risk Factors  0 Filed at: 08/13/2018 1657   Troponin  0 Filed at: 08/13/2018 1657   Heart Score Risk Calculator   History  0 Filed at: 08/13/2018 1657   ECG  0 Filed at: 08/13/2018 1657   Age  0 Filed at: 08/13/2018 1657   Risk Factors  0 Filed at: 08/13/2018 1657   Troponin  0 Filed at: 08/13/2018 1657   HEART Score  0 Filed at: 08/13/2018 1657   HEART Score  0 Filed at: 08/13/2018 1657                Stroke Assessment     Row Name 08/13/18 1558             NIH Stroke Scale    Interval Baseline      Level of Consciousness (1a ) 0      LOC Questions (1b ) 0      LOC Commands (1c ) 0      Best Gaze (2 ) 0      Visual (3 ) 0      Facial Palsy (4 ) 0      Motor Arm, Left (5a ) 0      Motor Arm, Right (5b ) 0      Motor Leg, Left (6a ) 0      Motor Leg, Right (6b ) 0      Limb Ataxia (7 ) 0      Sensory (8 ) 0      Best Language (9 ) 0      Dysarthria (10 ) 0      Extinction and Inattention (11 ) (Formerly Neglect) 0      Total 0                          MDM  CritCare Time    Disposition  Final diagnoses:   Hypokalemia     Time reflects when diagnosis was documented in both MDM as applicable and the Disposition within this note     Time User Action Codes Description Comment    8/13/2018  6:48 PM Ange ORTIZ Add [E87 6] Hypokalemia       ED Disposition     ED Disposition Condition Comment    Discharge  Kenneth Patino discharge to home/self care  Condition at discharge: Good        Follow-up Information     Follow up With Specialties Details Why [address] [place], DO Nephrology Call in 1 day  [address] [place] [address]  [phone]            Patient's Medications   Discharge Prescriptions    No medications on file     No discharge procedures on file      ED Provider  Electronically Signed by           Vivienne Villela PA-C  08/13/18 9088

## 2018-08-13 NOTE — ED ATTENDING ATTESTATION
Delray Hashimoto, DO, saw and evaluated the patient  I have discussed the patient with the resident/non-physician practitioner and agree with the resident's/non-physician practitioner's findings, Plan of Care, and MDM as documented in the resident's/non-physician practitioner's note, except where noted  All available labs and Radiology studies were reviewed  At this point I agree with the current assessment done in the Emergency Department  I have conducted an independent evaluation of this patient a history and physical is as follows:      Critical Care Time  CritCare Time     69-year-old female presents with cramping and weakness  Patient has potassium 2 6  Will replete parenterally    Monitor EKG for signs symptoms of hypokalemia      Procedures

## 2018-08-13 NOTE — ED NOTES
Meal tray provided to patient at this time    Call bell within reach     Brayan Kaufman RN  08/13/18 7703

## 2018-08-13 NOTE — DISCHARGE INSTRUCTIONS
Hypokalemia   WHAT YOU NEED TO KNOW:   Hypokalemia is a low level of potassium in your blood  Potassium helps control how your muscles, heart, and digestive system work  Hypokalemia occurs when your body loses too much potassium or does not absorb enough from food  DISCHARGE INSTRUCTIONS:   Return to the emergency department if:   · You cannot move your arm or leg  · You have a fast or irregular heartbeat  · You are too tired or weak to stand up  Contact your healthcare provider if:   · You are vomiting, or you have diarrhea  · You have numbness or tingling in your arms or legs  · Your symptoms do not go away or they get worse  · You have questions or concerns about your condition or care  Medicines:   · Potassium  will be given to bring your potassium levels back to normal     · Take your medicine as directed  Contact your healthcare provider if you think your medicine is not helping or if you have side effects  Tell him of her if you are allergic to any medicine  Keep a list of the medicines, vitamins, and herbs you take  Include the amounts, and when and why you take them  Bring the list or the pill bottles to follow-up visits  Carry your medicine list with you in case of an emergency  Eat foods that are high in potassium:  Foods that are high in potassium include bananas, oranges, tomatoes, potatoes, and avocado  Kang beans, turkey, salmon, lean beef, yogurt, and milk are also high in potassium  Ask your healthcare provider or dietitian for more information about foods that are high in potassium  Follow up with your healthcare provider as directed:  Write down your questions so you remember to ask them during your visits  © 2017 [employer]  Information is for End User's use only and may not be sold, redistributed or otherwise used for commercial purposes   All illustrations and images included in CareNotes® are the copyrighted property of [employer]  or [employer]  The above information is an  only  It is not intended as medical advice for individual conditions or treatments  Talk to your doctor, nurse or pharmacist before following any medical regimen to see if it is safe and effective for you  Take 40 mg in the morning and 40 mg in the evening

## 2018-08-14 LAB
ATRIAL RATE: 79 BPM
P AXIS: 8 DEGREES
PR INTERVAL: 110 MS
QRS AXIS: 41 DEGREES
QRSD INTERVAL: 104 MS
QT INTERVAL: 408 MS
QTC INTERVAL: 467 MS
T WAVE AXIS: 38 DEGREES
VENTRICULAR RATE: 79 BPM

## 2018-08-14 PROCEDURE — 93010 ELECTROCARDIOGRAM REPORT: CPT | Performed by: INTERNAL MEDICINE

## 2018-08-15 ENCOUNTER — HOSPITAL ENCOUNTER (INPATIENT)
Facility: HOSPITAL | Age: 33
LOS: 1 days | Discharge: HOME | DRG: 425 | End: 2018-08-17
Attending: EMERGENCY MEDICINE | Admitting: INTERNAL MEDICINE
Payer: COMMERCIAL

## 2018-08-15 DIAGNOSIS — E83.39 HYPOPHOSPHATEMIA: ICD-10-CM

## 2018-08-15 DIAGNOSIS — E87.6 HYPOKALEMIA: Primary | ICD-10-CM

## 2018-08-15 DIAGNOSIS — IMO0001 PARESTHESIAS/NUMBNESS: ICD-10-CM

## 2018-08-15 PROBLEM — R20.2 PARESTHESIA: Status: ACTIVE | Noted: 2018-08-15

## 2018-08-15 PROBLEM — R20.2 PARESTHESIA OF BOTH LOWER EXTREMITIES: Status: ACTIVE | Noted: 2018-08-15

## 2018-08-15 LAB
ALBUMIN SERPL BCP-MCNC: 3.2 G/DL (ref 3.5–5)
ALP SERPL-CCNC: 57 U/L (ref 46–116)
ALT SERPL W P-5'-P-CCNC: 32 U/L (ref 12–78)
ANION GAP SERPL CALCULATED.3IONS-SCNC: 5 MMOL/L (ref 4–13)
AST SERPL W P-5'-P-CCNC: 30 U/L (ref 5–45)
BASE EX.OXY STD BLDV CALC-SCNC: 48.5 % (ref 60–80)
BASE EXCESS BLDV CALC-SCNC: -4.9 MMOL/L
BASOPHILS # BLD AUTO: 0.05 THOUSANDS/ΜL (ref 0–0.1)
BASOPHILS NFR BLD AUTO: 1 % (ref 0–1)
BILIRUB DIRECT SERPL-MCNC: 0.05 MG/DL (ref 0–0.2)
BILIRUB SERPL-MCNC: 0.1 MG/DL (ref 0.2–1)
BILIRUB UR QL STRIP: NEGATIVE
BUN SERPL-MCNC: 18 MG/DL (ref 5–25)
CALCIUM SERPL-MCNC: 8.2 MG/DL (ref 8.3–10.1)
CHLORIDE SERPL-SCNC: 110 MMOL/L (ref 100–108)
CLARITY UR: CLEAR
CO2 SERPL-SCNC: 26 MMOL/L (ref 21–32)
COLOR UR: YELLOW
CREAT SERPL-MCNC: 1 MG/DL (ref 0.6–1.3)
EOSINOPHIL # BLD AUTO: 0.17 THOUSAND/ΜL (ref 0–0.61)
EOSINOPHIL NFR BLD AUTO: 2 % (ref 0–6)
ERYTHROCYTE [DISTWIDTH] IN BLOOD BY AUTOMATED COUNT: 14.6 % (ref 11.6–15.1)
EXT PREG TEST URINE: NEGATIVE
GFR SERPL CREATININE-BSD FRML MDRD: 74 ML/MIN/1.73SQ M
GLUCOSE SERPL-MCNC: 100 MG/DL (ref 65–140)
GLUCOSE UR STRIP-MCNC: NEGATIVE MG/DL
HCO3 BLDV-SCNC: 21.3 MMOL/L (ref 24–30)
HCT VFR BLD AUTO: 35.7 % (ref 34.8–46.1)
HGB BLD-MCNC: 11.5 G/DL (ref 11.5–15.4)
HGB UR QL STRIP.AUTO: NEGATIVE
IMM GRANULOCYTES # BLD AUTO: 0.03 THOUSAND/UL (ref 0–0.2)
IMM GRANULOCYTES NFR BLD AUTO: 0 % (ref 0–2)
KETONES UR STRIP-MCNC: ABNORMAL MG/DL
LEUKOCYTE ESTERASE UR QL STRIP: NEGATIVE
LYMPHOCYTES # BLD AUTO: 2.44 THOUSANDS/ΜL (ref 0.6–4.47)
LYMPHOCYTES NFR BLD AUTO: 29 % (ref 14–44)
MAGNESIUM SERPL-MCNC: 2.4 MG/DL (ref 1.6–2.6)
MCH RBC QN AUTO: 29.5 PG (ref 26.8–34.3)
MCHC RBC AUTO-ENTMCNC: 32.2 G/DL (ref 31.4–37.4)
MCV RBC AUTO: 92 FL (ref 82–98)
MONOCYTES # BLD AUTO: 0.54 THOUSAND/ΜL (ref 0.17–1.22)
MONOCYTES NFR BLD AUTO: 6 % (ref 4–12)
NEUTROPHILS # BLD AUTO: 5.18 THOUSANDS/ΜL (ref 1.85–7.62)
NEUTS SEG NFR BLD AUTO: 62 % (ref 43–75)
NITRITE UR QL STRIP: NEGATIVE
NRBC BLD AUTO-RTO: 0 /100 WBCS
O2 CT BLDV-SCNC: 8.7 ML/DL
PCO2 BLDV: 44 MM HG (ref 42–50)
PH BLDV: 7.3 [PH] (ref 7.3–7.4)
PH UR STRIP.AUTO: 6.5 [PH] (ref 4.5–8)
PHOSPHATE SERPL-MCNC: 3.6 MG/DL (ref 2.7–4.5)
PLATELET # BLD AUTO: 308 THOUSANDS/UL (ref 149–390)
PMV BLD AUTO: 10.5 FL (ref 8.9–12.7)
PO2 BLDV: 27.4 MM HG (ref 35–45)
POTASSIUM SERPL-SCNC: 2.7 MMOL/L (ref 3.5–5.3)
PROT SERPL-MCNC: 6.6 G/DL (ref 6.4–8.2)
PROT UR STRIP-MCNC: NEGATIVE MG/DL
RBC # BLD AUTO: 3.9 MILLION/UL (ref 3.81–5.12)
SODIUM SERPL-SCNC: 141 MMOL/L (ref 136–145)
SP GR UR STRIP.AUTO: 1.02 (ref 1–1.03)
UROBILINOGEN UR QL STRIP.AUTO: 0.2 E.U./DL
WBC # BLD AUTO: 8.41 THOUSAND/UL (ref 4.31–10.16)

## 2018-08-15 PROCEDURE — 82330 ASSAY OF CALCIUM: CPT | Performed by: EMERGENCY MEDICINE

## 2018-08-15 PROCEDURE — 93005 ELECTROCARDIOGRAM TRACING: CPT

## 2018-08-15 PROCEDURE — 80076 HEPATIC FUNCTION PANEL: CPT | Performed by: EMERGENCY MEDICINE

## 2018-08-15 PROCEDURE — 36415 COLL VENOUS BLD VENIPUNCTURE: CPT | Performed by: EMERGENCY MEDICINE

## 2018-08-15 PROCEDURE — 99219 PR INITIAL OBSERVATION CARE/DAY 50 MINUTES: CPT | Performed by: NURSE PRACTITIONER

## 2018-08-15 PROCEDURE — 81025 URINE PREGNANCY TEST: CPT | Performed by: EMERGENCY MEDICINE

## 2018-08-15 PROCEDURE — 80048 BASIC METABOLIC PNL TOTAL CA: CPT | Performed by: EMERGENCY MEDICINE

## 2018-08-15 PROCEDURE — 82805 BLOOD GASES W/O2 SATURATION: CPT | Performed by: EMERGENCY MEDICINE

## 2018-08-15 PROCEDURE — 83735 ASSAY OF MAGNESIUM: CPT | Performed by: EMERGENCY MEDICINE

## 2018-08-15 PROCEDURE — 81003 URINALYSIS AUTO W/O SCOPE: CPT | Performed by: EMERGENCY MEDICINE

## 2018-08-15 PROCEDURE — 96365 THER/PROPH/DIAG IV INF INIT: CPT

## 2018-08-15 PROCEDURE — 84100 ASSAY OF PHOSPHORUS: CPT | Performed by: EMERGENCY MEDICINE

## 2018-08-15 PROCEDURE — 85025 COMPLETE CBC W/AUTO DIFF WBC: CPT | Performed by: EMERGENCY MEDICINE

## 2018-08-15 PROCEDURE — 96375 TX/PRO/DX INJ NEW DRUG ADDON: CPT

## 2018-08-15 RX ORDER — MAGNESIUM SULFATE HEPTAHYDRATE 40 MG/ML
2 INJECTION, SOLUTION INTRAVENOUS ONCE
Status: COMPLETED | OUTPATIENT
Start: 2018-08-15 | End: 2018-08-15

## 2018-08-15 RX ORDER — POTASSIUM CHLORIDE 14.9 MG/ML
20 INJECTION INTRAVENOUS ONCE
Status: COMPLETED | OUTPATIENT
Start: 2018-08-15 | End: 2018-08-15

## 2018-08-15 RX ORDER — POTASSIUM CHLORIDE 20 MEQ/1
40 TABLET, EXTENDED RELEASE ORAL ONCE
Status: COMPLETED | OUTPATIENT
Start: 2018-08-15 | End: 2018-08-15

## 2018-08-15 RX ORDER — DIPHENHYDRAMINE HYDROCHLORIDE 50 MG/ML
25 INJECTION INTRAMUSCULAR; INTRAVENOUS ONCE
Status: COMPLETED | OUTPATIENT
Start: 2018-08-15 | End: 2018-08-15

## 2018-08-15 RX ORDER — METOCLOPRAMIDE HYDROCHLORIDE 5 MG/ML
10 INJECTION INTRAMUSCULAR; INTRAVENOUS ONCE
Status: COMPLETED | OUTPATIENT
Start: 2018-08-15 | End: 2018-08-15

## 2018-08-15 RX ADMIN — METOCLOPRAMIDE 10 MG: 5 INJECTION, SOLUTION INTRAMUSCULAR; INTRAVENOUS at 20:37

## 2018-08-15 RX ADMIN — MAGNESIUM SULFATE HEPTAHYDRATE 2 G: 40 INJECTION, SOLUTION INTRAVENOUS at 20:36

## 2018-08-15 RX ADMIN — DIPHENHYDRAMINE HYDROCHLORIDE 25 MG: 50 INJECTION INTRAMUSCULAR; INTRAVENOUS at 20:37

## 2018-08-15 RX ADMIN — POTASSIUM CHLORIDE 40 MEQ: 1500 TABLET, EXTENDED RELEASE ORAL at 21:59

## 2018-08-15 RX ADMIN — SODIUM CHLORIDE 1000 ML: 0.9 INJECTION, SOLUTION INTRAVENOUS at 20:36

## 2018-08-15 RX ADMIN — POTASSIUM CHLORIDE 20 MEQ: 200 INJECTION, SOLUTION INTRAVENOUS at 21:59

## 2018-08-15 NOTE — ED PROVIDER NOTES
History  Chief Complaint   Patient presents with    Numbness     Patient states she was seen Monday for numbness and it was low potassium  Patient was given potassium and discharged  Patient states she sees a nephrologist and was told to increase her potassium  Dania's nephrologist also prescribed Spironolactone that she started taking yesterday  Today, patient states she has numbness in her hands, feet, and face  This is a 28year-old woman who presents with progressive paresthesias of the bilateral upper and lower extremities and diffusely throughout the face bilaterally occurring today and persisting through ED arrival   She was seen by this provider in early July 2018 with significant similar hypoesthesia is a weakness of left upper extremity the setting of severe hypokalemia (1 8) and was admitted to this facility  She underwent an extensive workup including MRI and lumbar puncture did not reveal alternative etiology for her paresthesias and weakness; she was ultimately treated for hypokalemia, which in turn was felt to be multifactorial related to previous history of gastric bypass surgery and recent dietary changes  She was seen in this emergency department on 13 August 2018 with bilateral lower extremity paresthesias and found again to be hypokalemic with potassium 2 8  She underwent oral and IV repletion in the ED with increased dose of oral potassium intake at home as well as initiation of spironolactone at the direction of her nephrologist of which she has taken 2 doses thus far  She returns now with worse symptoms compared to 13 August with the extension of symptoms into the face as noted  She also notes a dull/throbbing frontal headache that is nonradiating and not associated with blurred vision/double vision/neck pain/chest pain chest palpitations/nausea/vomiting  No focal weakness associated current symptoms  Denies any preceding head trauma or injury   Does not have any other known CNS abnormality  Medical records reviewed from previous hospitalization  Patient with progressive/worsening paresthesias not in peripheral nerve or radicular distribution with confirmed history of hypokalemia of unclear etiology  No associated cranial nerve or strength/cerebellar symptoms or exam findings  She is not displaying any EKG changes related to any electrolyte abnormalities  Will check extensive laboratory workup to assess etiology of symptoms  Symptomatic treatment for headache in the interval         History provided by:  Patient, spouse and medical records      Prior to Admission Medications   Prescriptions Last Dose Informant Patient Reported? Taking?    Multiple Vitamins-Minerals (MULTIVITAMIN WITH MINERALS) tablet   No No   Sig: Take 1 tablet by mouth daily   amitriptyline (ELAVIL) 100 mg tablet   Yes No   Sig: Take 25 mg by mouth daily at bedtime   butalbital-acetaminophen-caffeine (FIORICET,ESGIC) -40 mg per tablet   No No   Sig: Take 1 tablet by mouth every 4 (four) hours as needed for headaches   calcium carbonate-vitamin D (OSCAL-D) 500 mg-200 units per tablet   No No   Sig: Take 1 tablet by mouth 2 (two) times a day with meals   cholecalciferol (VITAMIN D3) 1,000 units tablet   No No   Sig: Take 1 tablet (1,000 Units total) by mouth daily   cyanocobalamin 500 MCG tablet   No No   Sig: Take 1 tablet (500 mcg total) by mouth daily   meclizine (ANTIVERT) 25 mg tablet   No No   Sig: Take 1 tablet (25 mg total) by mouth every 12 (twelve) hours as needed for dizziness   potassium chloride (K-DUR,KLOR-CON) 20 mEq tablet   No No   Sig: Take 1 tablet (20 mEq total) by mouth daily   topiramate (TOPAMAX) 50 MG tablet   Yes No   Sig: Take 25 mg by mouth every 12 (twelve) hours      Facility-Administered Medications: None       Past Medical History:   Diagnosis Date    Hypokalemia        Past Surgical History:   Procedure Laterality Date    ABDOMINAL SURGERY      APPENDECTOMY      CHOLECYSTECTOMY  GASTRIC BYPASS      HYSTERECTOMY         History reviewed  No pertinent family history  I have reviewed and agree with the history as documented  Social History   Substance Use Topics    Smoking status: Never Smoker    Smokeless tobacco: Never Used    Alcohol use Yes      Comment: socially        Review of Systems   Constitutional: Negative for chills, diaphoresis, fatigue and fever  Eyes: Negative for photophobia and visual disturbance  Respiratory: Negative for cough and shortness of breath  Cardiovascular: Negative for chest pain and palpitations  Gastrointestinal: Negative for diarrhea, nausea and vomiting  Genitourinary: Negative for dysuria, flank pain and frequency  Skin: Negative for color change, pallor, rash and wound  Neurological: Positive for numbness and headaches  Negative for dizziness, weakness and light-headedness  Hematological: Negative for adenopathy  Does not bruise/bleed easily  All other systems reviewed and are negative  Physical Exam  Physical Exam   Constitutional: She is oriented to person, place, and time  She appears well-developed and well-nourished  She is cooperative  No distress  HENT:   Head: Normocephalic and atraumatic  Right Ear: Hearing and external ear normal    Left Ear: Hearing and external ear normal    Nose: Nose normal    Mouth/Throat: Uvula is midline, oropharynx is clear and moist and mucous membranes are normal  No oropharyngeal exudate  Eyes: Conjunctivae, EOM and lids are normal  Pupils are equal, round, and reactive to light  Neck: Trachea normal, normal range of motion and phonation normal  Neck supple  No JVD present  No tracheal tenderness, no spinous process tenderness and no muscular tenderness present  No tracheal deviation present  No thyroid mass and no thyromegaly present  Cardiovascular: Normal rate, regular rhythm, S1 normal, S2 normal, normal heart sounds and intact distal pulses    Exam reveals no gallop and no friction rub  No murmur heard  Pulses:       Radial pulses are 2+ on the right side, and 2+ on the left side  Dorsalis pedis pulses are 2+ on the right side, and 2+ on the left side  Posterior tibial pulses are 2+ on the right side, and 2+ on the left side  Pulmonary/Chest: Effort normal and breath sounds normal  No stridor  No respiratory distress  She has no decreased breath sounds  She has no wheezes  She has no rhonchi  She has no rales  She exhibits no tenderness  Abdominal: Soft  She exhibits no distension and no mass  There is no tenderness  There is no rigidity, no rebound, no guarding and no CVA tenderness  Musculoskeletal: Normal range of motion  She exhibits no edema, tenderness or deformity  Lymphadenopathy:     She has no cervical adenopathy  Neurological: She is alert and oriented to person, place, and time  She has normal strength  She displays no tremor  A sensory deficit (See notes) is present  No cranial nerve deficit  She exhibits normal muscle tone  GCS eye subscore is 4  GCS verbal subscore is 5  GCS motor subscore is 6  PERRLA; EOMI  Sensation intact to light touch over face in V1-V3 distribution bilaterally  Facial expressions symmetric  Tongue/uvula midline  Shoulder shrug equal bilaterally  Strength 5/5 in UE/LE bilaterally  There are mild paresthesias in sharp/dull sensation throughout the V1-V3 distribution of the face bilaterally  There are mild diffuse hypoesthesias to sharp/dull in distal bilateral hands and feet also; proximal sharp/dull sensation is equal bilaterally in the upper/lower extremity  Skin: Skin is warm, dry and intact  No rash noted  She is not diaphoretic  No erythema  Psychiatric: She has a normal mood and affect  Her speech is normal and behavior is normal    Nursing note and vitals reviewed        Vital Signs  ED Triage Vitals [08/15/18 1919]   Temperature Pulse Respirations Blood Pressure SpO2   97 7 °F (36 5 °C) 100 16 110/59 100 %      Temp Source Heart Rate Source Patient Position - Orthostatic VS BP Location FiO2 (%)   Temporal Monitor Sitting Right arm --      Pain Score       7           Vitals:    08/15/18 1919 08/15/18 2146 08/15/18 2345 08/16/18 0045   BP: 110/59 103/54     Pulse: 100 76 72 71   Patient Position - Orthostatic VS: Sitting Lying         Visual Acuity      ED Medications  Medications   amitriptyline (ELAVIL) tablet 25 mg (25 mg Oral Given 8/16/18 0122)   butalbital-acetaminophen-caffeine (FIORICET,ESGIC) -40 mg per tablet 1 tablet (not administered)   calcium carbonate-vitamin D (OSCAL-D) 500 mg-200 units per tablet 1 tablet (not administered)   cholecalciferol (VITAMIN D3) tablet 1,000 Units (not administered)   cyanocobalamin (VITAMIN B-12) tablet 500 mcg (not administered)   meclizine (ANTIVERT) tablet 25 mg (not administered)   multivitamin-minerals (CENTRUM) tablet 1 tablet (not administered)   topiramate (TOPAMAX) tablet 25 mg (25 mg Oral Given 8/16/18 0122)   metoclopramide (REGLAN) injection 10 mg (10 mg Intravenous Given 8/15/18 2037)   diphenhydrAMINE (BENADRYL) injection 25 mg (25 mg Intravenous Given 8/15/18 2037)   magnesium sulfate 2 g/50 mL IVPB (premix) 2 g (0 g Intravenous Stopped 8/15/18 2136)   sodium chloride 0 9 % bolus 1,000 mL (0 mL Intravenous Stopped 8/15/18 2136)   potassium chloride 20 mEq IVPB (premix) (0 mEq Intravenous Stopped 8/15/18 2359)   potassium chloride (K-DUR,KLOR-CON) CR tablet 40 mEq (40 mEq Oral Given 8/15/18 2159)       Diagnostic Studies  Results Reviewed     Procedure Component Value Units Date/Time    Basic metabolic panel [[id number]]     Lab Status:  No result Specimen:  Blood     Vitamin D Panel [[id number]]     Lab Status:  No result     Basic metabolic panel [[id number]]  (Abnormal) Collected:  08/15/18 2015    Lab Status:  Final result Specimen:  Blood from Arm, Right Updated:  08/15/18 2040     Sodium 141 mmol/L      Potassium 2 7 (LL) mmol/L      Chloride 110 (H) mmol/L      CO2 26 mmol/L      Anion Gap 5 mmol/L      BUN 18 mg/dL      Creatinine 1 00 mg/dL      Glucose 100 mg/dL      Calcium 8 2 (L) mg/dL      eGFR 74 ml/min/1 73sq m     Narrative:         National Kidney Disease Education Program recommendations are as follows:  GFR calculation is accurate only with a steady state creatinine  Chronic Kidney disease less than 60 ml/min/1 73 sq  meters  Kidney failure less than 15 ml/min/1 73 sq  meters      Magnesium [[id number]]  (Normal) Collected:  08/15/18 2015    Lab Status:  Final result Specimen:  Blood from Arm, Right Updated:  08/15/18 2038     Magnesium 2 4 mg/dL     Phosphorus [[id number]]  (Normal) Collected:  08/15/18 2015    Lab Status:  Final result Specimen:  Blood from Arm, Right Updated:  08/15/18 2038     Phosphorus 3 6 mg/dL     Hepatic function panel [[id number]]  (Abnormal) Collected:  08/15/18 2015    Lab Status:  Final result Specimen:  Blood from Arm, Right Updated:  08/15/18 2038     Total Bilirubin 0 10 (L) mg/dL      Bilirubin, Direct 0 05 mg/dL      Alkaline Phosphatase 57 U/L      AST 30 U/L      ALT 32 U/L      Total Protein 6 6 g/dL      Albumin 3 2 (L) g/dL     UA w Reflex to Microscopic w Reflex to Culture [[id number]]  (Abnormal) Collected:  08/15/18 2014    Lab Status:  Final result Specimen:  Urine from Urine, Clean Catch Updated:  08/15/18 2027     Color, UA Yellow     Clarity, UA Clear     Specific Gravity, UA 1 025     pH, UA 6 5     Leukocytes, UA Negative     Nitrite, UA Negative     Protein, UA Negative mg/dl      Glucose, UA Negative mg/dl      Ketones, UA Trace (A) mg/dl      Urobilinogen, UA 0 2 E U /dl      Bilirubin, UA Negative     Blood, UA Negative    Blood gas, venous [[id number]]  (Abnormal) Collected:  08/15/18 2015    Lab Status:  Final result Specimen:  Blood from Arm, Right Updated:  08/15/18 2024     pH, Uday 7 303     pCO2, Uday 44 0 mm Hg      pO2, Uday 27 4 (L) mm Hg      HCO3, Uday 21 3 (L) mmol/L      Base Excess, Uday -4 9 mmol/L      O2 Content, Uday 8 7 ml/dL      O2 HGB, VENOUS 48 5 (L) %     CBC and differential [[id number]] Collected:  08/15/18 2015    Lab Status:  Final result Specimen:  Blood from Arm, Right Updated:  08/15/18 2023     WBC 8 41 Thousand/uL      RBC 3 90 Million/uL      Hemoglobin 11 5 g/dL      Hematocrit 35 7 %      MCV 92 fL      MCH 29 5 pg      MCHC 32 2 g/dL      RDW 14 6 %      MPV 10 5 fL      Platelets 721 Thousands/uL      nRBC 0 /100 WBCs      Neutrophils Relative 62 %      Immat GRANS % 0 %      Lymphocytes Relative 29 %      Monocytes Relative 6 %      Eosinophils Relative 2 %      Basophils Relative 1 %      Neutrophils Absolute 5 18 Thousands/µL      Immature Grans Absolute 0 03 Thousand/uL      Lymphocytes Absolute 2 44 Thousands/µL      Monocytes Absolute 0 54 Thousand/µL      Eosinophils Absolute 0 17 Thousand/µL      Basophils Absolute 0 05 Thousands/µL     Calcium, ionized [[id number]] Collected:  08/15/18 2015    Lab Status:   In process Specimen:  Blood from Arm, Right Updated:  08/15/18 2020    POCT pregnancy, urine [[id number]]  (Normal) Resulted:  08/15/18 2017    Lab Status:  Final result Updated:  08/15/18 2018     EXT PREG TEST UR (Ref: Negative) negative                 No orders to display              Procedures  ECG 12 Lead Documentation  Date/Time: 8/15/2018 7:36 PM  Performed by: Sydnee Ferrer  Authorized by: Sydnee Ferrer     Indications / Diagnosis:  Paresthesias  ECG reviewed by me, the ED Provider: yes    Patient location:  ED  Previous ECG:     Previous ECG:  Compared to current    Comparison ECG info:  13 Aug 2018    Similarity:  No change    Comparison to cardiac monitor: Yes    Interpretation:     Interpretation: normal    Rate:     ECG rate:  85    ECG rate assessment: normal    Rhythm:     Rhythm: sinus rhythm    Ectopy:     Ectopy: none    QRS:     QRS axis:  Normal    QRS intervals:  Normal  Conduction:     Conduction: normal    ST segments:     ST segments:  Normal  T waves:     T waves: normal    Comments:      Pr 991 XXC 61 qtc 428             Phone Contacts  ED Phone Contact    ED Course  ED Course as of Aug 16 0400   Wed Aug 15, 2018   2047 1  WBC wnl   2  Hg/Hct wnl   3  Plt wnl   4  Electrolytes show significant hypokalemia and hyperchloremia; hypocalcemia although corrected for hypoalbuminemia it is wnl   5  UPT negative  6  VBG shows low normal pH with low HCO3 although serum CO2 is normal   7  UA demonstrates trace ketonuria; otherwise wnl  Given the progression and worsening of symptoms with failure to respond to aggressive IV potassium repletion during ED visit 2 days prior, I recommended admission for further IV potassium repletion and investigation as indicated  The patient was agreeable to this plan  2141 Page placed to hospitalist     2152 D/w NASRA Woodruff  Patient case discussed  Accepts in observation to Dr Yen Warner            [facility]  Number of Diagnoses or Management Options  Hypokalemia: established and worsening  Paresthesias/numbness: established and worsening     Amount and/or Complexity of Data Reviewed  Clinical lab tests: ordered and reviewed  Decide to obtain previous medical records or to obtain history from someone other than the patient: yes  Obtain history from someone other than the patient: yes  Review and summarize past medical records: yes  Discuss the patient with other providers: yes    Risk of Complications, Morbidity, and/or Mortality  Presenting problems: high  Diagnostic procedures: high  Management options: high    Patient Progress  Patient progress: stable    CritCare Time    Disposition  Final diagnoses:   Hypokalemia   Paresthesias/numbness     Time reflects when diagnosis was documented in both MDM as applicable and the Disposition within this note     Time User Action Codes Description Comment    8/15/2018  9:56 PM Dilshad Harris Add [E87 6] Hypokalemia     8/15/2018  9:56 PM Dilshad Harris Add [R20 9] Paresthesias/numbness       ED Disposition     ED Disposition Condition Comment    Admit  Case was discussed with NASRA Fong and the patient's admission status was agreed to be Admission Status: observation status to the service of Dr Maria A Anna  Follow-up Information    None         Patient's Medications   Discharge Prescriptions    No medications on file     No discharge procedures on file      ED Provider  Electronically Signed by           Charlie Lew DO  08/16/18 0764

## 2018-08-16 LAB
ANION GAP SERPL CALCULATED.3IONS-SCNC: 11 MMOL/L (ref 4–13)
ANION GAP SERPL CALCULATED.3IONS-SCNC: 8 MMOL/L (ref 4–13)
ATRIAL RATE: 85 BPM
BACTERIA UR QL AUTO: ABNORMAL /HPF
BASOPHILS # BLD AUTO: 0.04 THOUSANDS/ΜL (ref 0–0.1)
BASOPHILS NFR BLD AUTO: 1 % (ref 0–1)
BILIRUB UR QL STRIP: NEGATIVE
BUN SERPL-MCNC: 14 MG/DL (ref 5–25)
BUN SERPL-MCNC: 14 MG/DL (ref 5–25)
CA-I BLD-SCNC: 1.16 MMOL/L (ref 1.12–1.32)
CALCIUM SERPL-MCNC: 7.5 MG/DL (ref 8.3–10.1)
CALCIUM SERPL-MCNC: 7.8 MG/DL (ref 8.3–10.1)
CHLORIDE SERPL-SCNC: 112 MMOL/L (ref 100–108)
CHLORIDE SERPL-SCNC: 113 MMOL/L (ref 100–108)
CLARITY UR: CLEAR
CO2 SERPL-SCNC: 15 MMOL/L (ref 21–32)
CO2 SERPL-SCNC: 22 MMOL/L (ref 21–32)
COLOR UR: YELLOW
CREAT SERPL-MCNC: 0.8 MG/DL (ref 0.6–1.3)
CREAT SERPL-MCNC: 1 MG/DL (ref 0.6–1.3)
CREAT UR-MCNC: 182 MG/DL
EOSINOPHIL # BLD AUTO: 0.13 THOUSAND/ΜL (ref 0–0.61)
EOSINOPHIL NFR BLD AUTO: 2 % (ref 0–6)
ERYTHROCYTE [DISTWIDTH] IN BLOOD BY AUTOMATED COUNT: 14.7 % (ref 11.6–15.1)
GFR SERPL CREATININE-BSD FRML MDRD: 74 ML/MIN/1.73SQ M
GFR SERPL CREATININE-BSD FRML MDRD: 96 ML/MIN/1.73SQ M
GLUCOSE SERPL-MCNC: 118 MG/DL (ref 65–140)
GLUCOSE SERPL-MCNC: 83 MG/DL (ref 65–140)
GLUCOSE UR STRIP-MCNC: NEGATIVE MG/DL
HCT VFR BLD AUTO: 30.7 % (ref 34.8–46.1)
HGB BLD-MCNC: 10.1 G/DL (ref 11.5–15.4)
HGB UR QL STRIP.AUTO: NEGATIVE
IMM GRANULOCYTES # BLD AUTO: 0.02 THOUSAND/UL (ref 0–0.2)
IMM GRANULOCYTES NFR BLD AUTO: 0 % (ref 0–2)
KETONES UR STRIP-MCNC: NEGATIVE MG/DL
LEUKOCYTE ESTERASE UR QL STRIP: NEGATIVE
LYMPHOCYTES # BLD AUTO: 2.15 THOUSANDS/ΜL (ref 0.6–4.47)
LYMPHOCYTES NFR BLD AUTO: 32 % (ref 14–44)
MAGNESIUM SERPL-MCNC: 2.4 MG/DL (ref 1.6–2.6)
MCH RBC QN AUTO: 30.1 PG (ref 26.8–34.3)
MCHC RBC AUTO-ENTMCNC: 32.9 G/DL (ref 31.4–37.4)
MCV RBC AUTO: 91 FL (ref 82–98)
MONOCYTES # BLD AUTO: 0.54 THOUSAND/ΜL (ref 0.17–1.22)
MONOCYTES NFR BLD AUTO: 8 % (ref 4–12)
NEUTROPHILS # BLD AUTO: 3.78 THOUSANDS/ΜL (ref 1.85–7.62)
NEUTS SEG NFR BLD AUTO: 57 % (ref 43–75)
NITRITE UR QL STRIP: NEGATIVE
NON-SQ EPI CELLS URNS QL MICRO: ABNORMAL /HPF
NRBC BLD AUTO-RTO: 0 /100 WBCS
P AXIS: 29 DEGREES
PH UR STRIP.AUTO: 6 [PH] (ref 4.5–8)
PHOSPHATE SERPL-MCNC: 2.3 MG/DL (ref 2.7–4.5)
PLATELET # BLD AUTO: 249 THOUSANDS/UL (ref 149–390)
PMV BLD AUTO: 10.6 FL (ref 8.9–12.7)
POTASSIUM SERPL-SCNC: 2.9 MMOL/L (ref 3.5–5.3)
POTASSIUM SERPL-SCNC: 3.8 MMOL/L (ref 3.5–5.3)
POTASSIUM UR-SCNC: 13 MMOL/L (ref 1–300)
PR INTERVAL: 122 MS
PROT UR STRIP-MCNC: NEGATIVE MG/DL
QRS AXIS: 45 DEGREES
QRSD INTERVAL: 96 MS
QT INTERVAL: 360 MS
QTC INTERVAL: 428 MS
RBC # BLD AUTO: 3.36 MILLION/UL (ref 3.81–5.12)
RBC #/AREA URNS AUTO: ABNORMAL /HPF
SODIUM 24H UR-SCNC: 55 MOL/L
SODIUM SERPL-SCNC: 139 MMOL/L (ref 136–145)
SODIUM SERPL-SCNC: 142 MMOL/L (ref 136–145)
SP GR UR STRIP.AUTO: 1.02 (ref 1–1.03)
T WAVE AXIS: 46 DEGREES
UROBILINOGEN UR QL STRIP.AUTO: 0.2 E.U./DL
VENTRICULAR RATE: 85 BPM
WBC # BLD AUTO: 6.66 THOUSAND/UL (ref 4.31–10.16)
WBC #/AREA URNS AUTO: ABNORMAL /HPF

## 2018-08-16 PROCEDURE — 84133 ASSAY OF URINE POTASSIUM: CPT | Performed by: INTERNAL MEDICINE

## 2018-08-16 PROCEDURE — G8985 CARRY GOAL STATUS: HCPCS

## 2018-08-16 PROCEDURE — 99232 SBSQ HOSP IP/OBS MODERATE 35: CPT | Performed by: PHYSICIAN ASSISTANT

## 2018-08-16 PROCEDURE — 97162 PT EVAL MOD COMPLEX 30 MIN: CPT | Performed by: PHYSICAL THERAPIST

## 2018-08-16 PROCEDURE — 97165 OT EVAL LOW COMPLEX 30 MIN: CPT

## 2018-08-16 PROCEDURE — 82570 ASSAY OF URINE CREATININE: CPT | Performed by: INTERNAL MEDICINE

## 2018-08-16 PROCEDURE — 80048 BASIC METABOLIC PNL TOTAL CA: CPT | Performed by: INTERNAL MEDICINE

## 2018-08-16 PROCEDURE — 84100 ASSAY OF PHOSPHORUS: CPT | Performed by: NURSE PRACTITIONER

## 2018-08-16 PROCEDURE — G8979 MOBILITY GOAL STATUS: HCPCS | Performed by: PHYSICAL THERAPIST

## 2018-08-16 PROCEDURE — 84300 ASSAY OF URINE SODIUM: CPT | Performed by: INTERNAL MEDICINE

## 2018-08-16 PROCEDURE — 36415 COLL VENOUS BLD VENIPUNCTURE: CPT | Performed by: NURSE PRACTITIONER

## 2018-08-16 PROCEDURE — 80048 BASIC METABOLIC PNL TOTAL CA: CPT | Performed by: NURSE PRACTITIONER

## 2018-08-16 PROCEDURE — 93010 ELECTROCARDIOGRAM REPORT: CPT | Performed by: INTERNAL MEDICINE

## 2018-08-16 PROCEDURE — 85025 COMPLETE CBC W/AUTO DIFF WBC: CPT | Performed by: NURSE PRACTITIONER

## 2018-08-16 PROCEDURE — 99285 EMERGENCY DEPT VISIT HI MDM: CPT

## 2018-08-16 PROCEDURE — G8978 MOBILITY CURRENT STATUS: HCPCS | Performed by: PHYSICAL THERAPIST

## 2018-08-16 PROCEDURE — 81001 URINALYSIS AUTO W/SCOPE: CPT | Performed by: INTERNAL MEDICINE

## 2018-08-16 PROCEDURE — G8984 CARRY CURRENT STATUS: HCPCS

## 2018-08-16 PROCEDURE — 83735 ASSAY OF MAGNESIUM: CPT | Performed by: NURSE PRACTITIONER

## 2018-08-16 PROCEDURE — 82306 VITAMIN D 25 HYDROXY: CPT | Performed by: NURSE PRACTITIONER

## 2018-08-16 PROCEDURE — G8980 MOBILITY D/C STATUS: HCPCS | Performed by: PHYSICAL THERAPIST

## 2018-08-16 RX ORDER — POTASSIUM CHLORIDE 14.9 MG/ML
20 INJECTION INTRAVENOUS ONCE
Status: COMPLETED | OUTPATIENT
Start: 2018-08-16 | End: 2018-08-16

## 2018-08-16 RX ORDER — POTASSIUM CHLORIDE 20MEQ/15ML
40 LIQUID (ML) ORAL 2 TIMES DAILY
Status: DISCONTINUED | OUTPATIENT
Start: 2018-08-16 | End: 2018-08-17 | Stop reason: HOSPADM

## 2018-08-16 RX ORDER — POTASSIUM CHLORIDE 20 MEQ/1
40 TABLET, EXTENDED RELEASE ORAL ONCE
Status: DISCONTINUED | OUTPATIENT
Start: 2018-08-16 | End: 2018-08-16

## 2018-08-16 RX ORDER — MECLIZINE HYDROCHLORIDE 25 MG/1
25 TABLET ORAL EVERY 12 HOURS PRN
Status: DISCONTINUED | OUTPATIENT
Start: 2018-08-16 | End: 2018-08-17 | Stop reason: HOSPADM

## 2018-08-16 RX ORDER — BUTALBITAL, ACETAMINOPHEN AND CAFFEINE 50; 325; 40 MG/1; MG/1; MG/1
1 TABLET ORAL EVERY 4 HOURS PRN
Status: DISCONTINUED | OUTPATIENT
Start: 2018-08-16 | End: 2018-08-17 | Stop reason: HOSPADM

## 2018-08-16 RX ORDER — POTASSIUM CHLORIDE 20 MEQ/1
40 TABLET, EXTENDED RELEASE ORAL 3 TIMES DAILY
Status: DISCONTINUED | OUTPATIENT
Start: 2018-08-16 | End: 2018-08-16

## 2018-08-16 RX ORDER — CHOLECALCIFEROL (VITAMIN D3) 125 MCG
500 CAPSULE ORAL DAILY
Status: DISCONTINUED | OUTPATIENT
Start: 2018-08-16 | End: 2018-08-17 | Stop reason: HOSPADM

## 2018-08-16 RX ORDER — MELATONIN
1000 DAILY
Status: DISCONTINUED | OUTPATIENT
Start: 2018-08-16 | End: 2018-08-17 | Stop reason: HOSPADM

## 2018-08-16 RX ORDER — B-COMPLEX WITH VITAMIN C
1 TABLET ORAL 2 TIMES DAILY WITH MEALS
Status: DISCONTINUED | OUTPATIENT
Start: 2018-08-16 | End: 2018-08-17 | Stop reason: HOSPADM

## 2018-08-16 RX ORDER — TOPIRAMATE 25 MG/1
25 TABLET ORAL EVERY 12 HOURS SCHEDULED
Status: DISCONTINUED | OUTPATIENT
Start: 2018-08-16 | End: 2018-08-17 | Stop reason: HOSPADM

## 2018-08-16 RX ORDER — AMITRIPTYLINE HYDROCHLORIDE 25 MG/1
25 TABLET, FILM COATED ORAL
Status: DISCONTINUED | OUTPATIENT
Start: 2018-08-16 | End: 2018-08-17 | Stop reason: HOSPADM

## 2018-08-16 RX ADMIN — BUTALBITAL, ACETAMINOPHEN AND CAFFEINE 1 TABLET: 50; 325; 40 TABLET ORAL at 12:38

## 2018-08-16 RX ADMIN — CYANOCOBALAMIN TAB 500 MCG 500 MCG: 500 TAB at 09:03

## 2018-08-16 RX ADMIN — DIBASIC SODIUM PHOSPHATE, MONOBASIC POTASSIUM PHOSPHATE AND MONOBASIC SODIUM PHOSPHATE 1 TABLET: 852; 155; 130 TABLET ORAL at 08:10

## 2018-08-16 RX ADMIN — CALCIUM CARBONATE-VITAMIN D TAB 500 MG-200 UNIT 1 TABLET: 500-200 TAB at 17:26

## 2018-08-16 RX ADMIN — TOPIRAMATE 25 MG: 25 TABLET, FILM COATED ORAL at 09:03

## 2018-08-16 RX ADMIN — CALCIUM CARBONATE-VITAMIN D TAB 500 MG-200 UNIT 1 TABLET: 500-200 TAB at 09:03

## 2018-08-16 RX ADMIN — POTASSIUM CHLORIDE 40 MEQ: 1500 TABLET, EXTENDED RELEASE ORAL at 08:11

## 2018-08-16 RX ADMIN — AMITRIPTYLINE HYDROCHLORIDE 25 MG: 25 TABLET, FILM COATED ORAL at 21:39

## 2018-08-16 RX ADMIN — POTASSIUM CHLORIDE 40 MEQ: 20 SOLUTION ORAL at 17:26

## 2018-08-16 RX ADMIN — AMITRIPTYLINE HYDROCHLORIDE 25 MG: 25 TABLET, FILM COATED ORAL at 01:22

## 2018-08-16 RX ADMIN — DIBASIC SODIUM PHOSPHATE, MONOBASIC POTASSIUM PHOSPHATE AND MONOBASIC SODIUM PHOSPHATE 1 TABLET: 852; 155; 130 TABLET ORAL at 11:59

## 2018-08-16 RX ADMIN — MULTIPLE VITAMINS W/ MINERALS TAB 1 TABLET: TAB at 09:03

## 2018-08-16 RX ADMIN — DIBASIC SODIUM PHOSPHATE, MONOBASIC POTASSIUM PHOSPHATE AND MONOBASIC SODIUM PHOSPHATE 1 TABLET: 852; 155; 130 TABLET ORAL at 21:39

## 2018-08-16 RX ADMIN — TOPIRAMATE 25 MG: 25 TABLET, FILM COATED ORAL at 01:22

## 2018-08-16 RX ADMIN — DIBASIC SODIUM PHOSPHATE, MONOBASIC POTASSIUM PHOSPHATE AND MONOBASIC SODIUM PHOSPHATE 1 TABLET: 852; 155; 130 TABLET ORAL at 17:26

## 2018-08-16 RX ADMIN — TOPIRAMATE 25 MG: 25 TABLET, FILM COATED ORAL at 21:39

## 2018-08-16 RX ADMIN — VITAMIN D, TAB 1000IU (100/BT) 1000 UNITS: 25 TAB at 09:03

## 2018-08-16 RX ADMIN — POTASSIUM CHLORIDE 20 MEQ: 200 INJECTION, SOLUTION INTRAVENOUS at 07:07

## 2018-08-16 NOTE — PLAN OF CARE
Problem: PHYSICAL THERAPY ADULT  Goal: Performs mobility at highest level of function for planned discharge setting  See evaluation for individualized goals  Outcome: Completed Date Met: 08/16/18  Prognosis: Good  Problem List: Impaired sensation, Pain, Decreased strength  Assessment: Pt is a 28year old female presenting with numbness bilateral LE's and face with hypokalemia  Pt presents with decreased sensation to light touch below knees however numbness does not limit function  Pt is performing all bed mobility transfers and ambulation at an (I) level with no LOB or instability  Pt safe to ambulate in room and hallway ad jony  No skilled PT indicated  Recommendation: Home independently     PT - OK to Discharge: Yes    See flowsheet documentation for full assessment

## 2018-08-16 NOTE — PHYSICAL THERAPY NOTE
PT Evaluation     08/16/18 1332   Note Type   Note type Eval only   Pain Assessment   Pain Score 6   Pain Location Head   Home Living   Type of [address] One level; Able to live on main level with bedroom/bathroom; Performs ADLs on one level  (no CARLOS)   Bathroom Shower/Tub Tub/shower unit   Bathroom Toilet Standard   Home Equipment (no DME)   Prior Function   Level of [place] Independent with ADLs and functional mobility   Lives With Spouse   ADL Assistance Independent   IADLs Independent   Comments (I) with driving   Restrictions/Precautions   Other Precautions Telemetry   General   Family/Caregiver Present No   Cognition   Arousal/Participation Alert   Orientation Level Oriented X4   Following Commands Follows all commands and directions without difficulty   RLE Assessment   RLE Assessment WFL  (4+/5)   LLE Assessment   LLE Assessment WFL  (4+/5)   Coordination   Sensation X   Light Touch   RLE Light Touch Impaired   RLE Light Touch Comments impaired sensation to light touch below knees   LLE Light Touch Impaired   LLE Light Touch Comments impaired sensation to light touch below knees   Bed Mobility   Supine to Sit 7  Independent   Sit to Supine 7  Independent   Additional Comments no LOB instability or complaint other than HA and numbness/tingling which doesn't limit function  Transfers   Sit to Stand 7  Independent   Stand to Sit 7  Independent   Stand pivot 7  Independent   Additional Comments No LOB or instability during ambulation  Pt safe to ambulate in room and hallway ad jony     Ambulation/Elevation   Gait pattern WNL   Gait Assistance 7  Independent   Assistive Device None   Distance 250ft no A D  (I)    Balance   Static Sitting Good   Dynamic Sitting Good   Static Standing Good   Dynamic Standing Good   Ambulatory Good   Endurance Deficit   Endurance Deficit No   Activity Tolerance   Activity Tolerance Patient tolerated treatment well   Assessment   Prognosis Good   Problem List Impaired sensation;Pain;Decreased strength   Assessment Pt is a 28year old female presenting with numbness bilateral LE's and face with hypokalemia  Pt presents with decreased sensation to light touch below knees however numbness does not limit function  Pt is performing all bed mobility transfers and ambulation at an (I) level with no LOB or instability  Pt safe to ambulate in room and hallway ad jony  No skilled PT indicated  Goals   Patient Goals To return home   Plan   Treatment/Interventions Functional transfer training; Endurance training;Patient/family training;Bed mobility;Gait training   PT Frequency One time visit   Recommendation   Recommendation Home independently   PT - OK to Discharge Yes   no SCD's, Seen in ED  Pt returned to stretcher with call bell in reach

## 2018-08-16 NOTE — ED NOTES
Pt complaining of numbness in extremities when checked on rounds  oob to bathroom        Meseret Funez RN  08/16/18 5533

## 2018-08-16 NOTE — CONSULTS
Consultation - Nephrology   Eliot Patel 28 y o  female MRN: [id number]  Unit/Bed#: 420-01 Encounter: [id number]      A/P:  1  Hypokalemia   I will switch the patient from potassium tablets to Potassium elixir to see if the resistant hypokalemia is simply due to poor absorption given the patient's history of gastric bypass surgery  As mentioned in the HPI, the patient's magnesium levels have been appropriate  Phosphorus level is decreased, this once again brings up the potential issue of significant decreases in nutritional intake  Will check the patient's urine potassium, urine creatinine and cortisol  Her TSH was normal    Patient to continue to have close monitoring with blood work, remain on tele for cardiac rhythm monitoring  2   Hypophosphatemia-likely due to dietary insufficiency   Replete and reassess in the morning  3   Gastric bypass status  4  Paresthesias-likely electrolyte related   Will work on replete electrolytes  Patient's myelin basic protein in the cerebral spinal fluid came back elevated at 4 5 ng/mL, this was discussed with hospitalist   Will consider further evaluation by Neurology versus other course of action  Thank you for allowing us to participate in the care of your patient  Please feel free to contact us regarding the care of this patient, or any other questions/concerns that may be applicable      Patient Active Problem List   Diagnosis    Hypokalemia    History of gastric bypass    Migraine without aura and without status migrainosus, not intractable    Left-sided weakness    Hypocalcemia    Anemia    Vitamin D deficiency    Weakness of both lower extremities    Weakness of both upper extremities    Elevated CPK    Vitamin B12 deficiency    Cervical lymphadenopathy    Generalized weakness    Paresthesia of both lower extremities    Paresthesia       History of Present Illness   Physician Requesting Consult: Merna Flores DO  Reason for Consult / Principal Problem:  Hypokalemia  Hx and PE limited by:   HPI: Jaswinder Michael is a 28y o  year old female who presented to the emergency department with complaints of paresthesias around the lips and face which radiated up into the nasal cavity and down into her jaws was found to have reduced potassium of 2 6 millimole/L  Of note, the patient is followed by us in outpatient setting for her decreased potassium  She was on 40 mEq p o  daily when I 1st evaluated her in late July  Repeat potassium levels noted a decreased at 3 1 at which time we increased her potassium up to 60 mEq daily  The patient claims to have be compliant with her oral tablets  The way she takes it is by crushing the tablets in yogurt and ingesting them  Repeat blood work after she increased oral intake to 60 mEq daily showed a potassium down to 2 6  At that time the patient had symptoms of paresthesias presented to the emergency department were the keep her an extra dose via the IV route and have her increase her oral intake up to 80 mEq daily and we called in for spironolactone 25 mg p o  daily  Patient is taken the spironolactone for 2 days prior to presentation to the hospital here  Magnesium levels have been appropriate  Patient then returned for this final time on August 15th to the emergency room with a potassium of 2 7 mg/L with the continuation of her paresthesias  Additional symptoms include cramping specifically the left hand  Patient denies nausea vomiting or diarrhea she denies any use of laxatives and claims to have 2 soft bowel movements daily  History obtained from chart review and the patient    Review of Systems - Negative except as mentioned above in HPI, more specifics as mentioned below    Review of Systems - General ROS: positive for  - fatigue  Psychological ROS: negative  Ophthalmic ROS: negative  ENT ROS: negative  Allergy and Immunology ROS: negative  Hematological and Lymphatic ROS: negative  Endocrine ROS: negative  Respiratory ROS: no cough, shortness of breath, or wheezing  Cardiovascular ROS: no chest pain or dyspnea on exertion  Gastrointestinal ROS: no abdominal pain, change in bowel habits, or black or bloody stools  Genito-Urinary ROS: no dysuria, trouble voiding, or hematuria  Musculoskeletal ROS:  Cramps  Neurological ROS: positive for - as mentioned above  Dermatological ROS: negative    Historical Information   Past Medical History:   Diagnosis Date    Hypokalemia      Past Surgical History:   Procedure Laterality Date    ABDOMINAL SURGERY      APPENDECTOMY      CHOLECYSTECTOMY      GASTRIC BYPASS      HYSTERECTOMY       Social History   History   Alcohol Use    Yes     Comment: socially     History   Drug Use No     History   Smoking Status    Never Smoker   Smokeless Tobacco    Never Used     History reviewed  No pertinent family history      Meds/Allergies   all current active meds have been reviewed, current meds: Current Facility-Administered Medications   Medication Dose Route Frequency    amitriptyline (ELAVIL) tablet 25 mg  25 mg Oral HS    butalbital-acetaminophen-caffeine (FIORICET,ESGIC) -40 mg per tablet 1 tablet  1 tablet Oral Q4H PRN    calcium carbonate-vitamin D (OSCAL-D) 500 mg-200 units per tablet 1 tablet  1 tablet Oral BID With Meals    cholecalciferol (VITAMIN D3) tablet 1,000 Units  1,000 Units Oral Daily    cyanocobalamin (VITAMIN B-12) tablet 500 mcg  500 mcg Oral Daily    meclizine (ANTIVERT) tablet 25 mg  25 mg Oral Q12H PRN    multivitamin-minerals (CENTRUM) tablet 1 tablet  1 tablet Oral Daily    potassium chloride (K-DUR,KLOR-CON) CR tablet 40 mEq  40 mEq Oral TID    potassium-sodium phosphateS (K-PHOS,PHOSPHA 250) -250 mg tablet 1 tablet  1 tablet Oral 4x Daily (with meals and at bedtime)    topiramate (TOPAMAX) tablet 25 mg  25 mg Oral Q12H [address]    and PTA meds:  Prescriptions Prior to Admission   Medication    amitriptyline (ELAVIL) 100 mg tablet  butalbital-acetaminophen-caffeine (FIORICET,ESGIC) -40 mg per tablet    calcium carbonate-vitamin D (OSCAL-D) 500 mg-200 units per tablet    cholecalciferol (VITAMIN D3) 1,000 units tablet    cyanocobalamin 500 MCG tablet    meclizine (ANTIVERT) 25 mg tablet    Multiple Vitamins-Minerals (MULTIVITAMIN WITH MINERALS) tablet    potassium chloride (K-DUR,KLOR-CON) 20 mEq tablet    topiramate (TOPAMAX) 50 MG tablet         Allergies   Allergen Reactions    Nsaids      Other reaction(s): Other (Please comment)  Should not take s/p bariatric surgery       Objective     Intake/Output Summary (Last 24 hours) at 08/16/18 1511  Last data filed at 08/16/18 0906   Gross per 24 hour   Intake             1250 ml   Output                0 ml   Net             1250 ml       Invasive Devices:        Physical Exam      I/O last 3 completed shifts: In: 1150 [IV Piggyback:1150]  Out: -     Vitals:    08/16/18 1458   BP: 104/54   Pulse: 79   Resp: 18   Temp: 97 6 °F (36 4 °C)   SpO2: 99%       Gen: in NAD, Alert/Awake  HEENT: no sclerous icterus, MMM, neck supple  CV: +S1/S2, RRR  Lungs: CTA bilaterally  Abd: +BS, soft NT/ND  Ext: all four extremities are warm  Skin: no rashes noted  Neuro: CN II-XII intact    Current Weight: Weight - Scale: 71 5 kg (157 lb 10 1 oz)  First Weight: Weight - Scale: 68 3 kg (150 lb 9 6 oz)    Lab Results:  I have personally reviewed pertinent labs      CBC: Lab Results   Component Value Date    WBC 6 66 08/16/2018    HGB 10 1 (L) 08/16/2018    HCT 30 7 (L) 08/16/2018    MCV 91 08/16/2018     08/16/2018    MCH 30 1 08/16/2018    MCHC 32 9 08/16/2018    RDW 14 7 08/16/2018    MPV 10 6 08/16/2018    NRBC 0 08/16/2018     CMP: Lab Results   Component Value Date     08/16/2018    K 2 9 (L) 08/16/2018     (H) 08/16/2018    CO2 22 08/16/2018    ANIONGAP 8 08/16/2018    BUN 14 08/16/2018    CREATININE 0 80 08/16/2018    GLUCOSE 83 08/16/2018    CALCIUM 7 8 (L) 08/16/2018    AST 30 08/15/2018    ALT 32 08/15/2018    ALKPHOS 57 08/15/2018    PROT 6 6 08/15/2018    BILITOT 0 10 (L) 08/15/2018    EGFR 96 08/16/2018     Phosphorus:   Lab Results   Component Value Date    PHOS 2 3 (L) 08/16/2018     Magnesium:   Lab Results   Component Value Date    MG 2 4 08/16/2018     Urinalysis: Lab Results   Component Value Date    COLORU Yellow 08/15/2018    CLARITYU Clear 08/15/2018    SPECGRAV 1 025 08/15/2018    PHUR 6 5 08/15/2018    LEUKOCYTESUR Negative 08/15/2018    NITRITE Negative 08/15/2018    PROTEINUA Negative 08/15/2018    GLUCOSEU Negative 08/15/2018    KETONESU Trace (A) 08/15/2018    BILIRUBINUR Negative 08/15/2018    BLOODU Negative 08/15/2018     Ionized Calcium:   Lab Results   Component Value Date    CAION 1 16 08/15/2018     Coagulation: No results found for: PT, INR, APTT  Troponin: No results found for: TROPONINI  ABG: No results found for: PHART, TLW1VDL, PO2ART, IFN0LVU, Q0JIUOQZ, BEART, SOURCE      Results from last 7 days  Lab Units 08/16/18  0518 08/15/18  2015 08/13/18  1559   SODIUM mmol/L 142 141 135*   POTASSIUM mmol/L 2 9* 2 7* 2 6*   CHLORIDE mmol/L 112* 110* 100   CO2 mmol/L 22 26 29   BUN mg/dL 14 18 15   CREATININE mg/dL 0 80 1 00 0 93   CALCIUM mg/dL 7 8* 8 2* 8 4   TOTAL PROTEIN g/dL  --  6 6 7 3   BILIRUBIN TOTAL mg/dL  --  0 10* 0 20   ALK PHOS U/L  --  57 62   ALT U/L  --  32 27   AST U/L  --  30 39   GLUCOSE RANDOM mg/dL 83 100 48*       Radiology review:  No results found  Counseling / Coordination of Care  Total ADDITIONAL floor / unit time spent today 25 minutes  Greater than 50% of total time was spent with the patient and / or family counseling and / or coordination of care       David Uriostegui DO

## 2018-08-16 NOTE — ED NOTES
Pt called nuse due to having muscle cramping inleft hand with twitching and states "this is how I get when my potassium is low"  Pt reassured that she is for lab work shortly       Stephanie Lepe RN  08/16/18 3467

## 2018-08-16 NOTE — H&P
H&P- Eliot Patel 1985, 28 y o  female MRN: [id number]    Unit/Bed#: RM06 Encounter: [id number]    Primary Care Provider: Tye Castillo MD   Date and time admitted to hospital: 8/15/2018  7:27 PM        Paresthesia   Assessment & Plan    Ordered iodized calcium and Vit D panel   No loss of sensation - feels numb and tingly, good strength through out 5/5  MRI C spine reviewed from 7/11/18 - "Normal appearance of the cervical spinal cord  No cord compression or cord signal abnormality  No significant central or foraminal narrowing "        * Hypokalemia   Assessment & Plan    Recurrent episode of hypokalemia- K-2 7 today -   Pat complains of paresthesia in b/l le and b/l ue in addition to around her mouth   Potassium was repleted in the ER collect new bmp @ 0000 and tomorrow am 0600   Continual cardiac monitoring  VS per protocol  Nephrology consulted              Hypocalcemia   Assessment & Plan    Ordered ionized calcium and vit D panel  Monitor bmp  Continual cardiac monitoring         History of gastric bypass   Assessment & Plan    Monitor electrolytes   Continue PTA Vitamin             VTE Prophylaxis: Pharmacologic VTE Prophylaxis contraindicated due to low risk-- early ambulation   / reason for no mechanical VTE prophylaxis early ambulation    Code Status: FULL  POLST: POLST is not applicable to this patient    Anticipated Length of Stay:  Patient will be admitted on an Observation basis with an anticipated length of stay of  Less than 2 midnights  Justification for Hospital Stay: continual cardiac monitoring and electrolyte monitoring and repletion     Total Time for Visit, including Counseling / Coordination of Care: 1 hour  Greater than 50% of this total time spent on direct patient counseling and coordination of care      Chief Complaint:   Numbness and tingling sensation both feet and both hands and around her mouth     History of Present Illness:    Eliot Patel is a 28 y o  female who presents with CC of paresthesia in b/l le and bl u/e and around her mouth  She reports that she has been having an ongoing issue with loosing potassium and has had several visits to the ER for replacement  Chart indicates that had additional testing including  MRI and lumbar puncture - which did not reveal an etiology for her paresthesias  Diagnosis to date appears to be secondary to  multifactorial d/t  history of gastric bypass surgery and recent dietary changes  Chart also indicates that again she was seen in  ER  On 8/13/18 again with bilateral lower extremity paresthesias and found again to be hypokalemic with potassium 2 8  Potassium IV repletion was completed in the ED with and an increase in oral potassium intake at home and began spironolactone at the direction of her nephrologist of which she has taken 2 doses to date  Review of Systems:    Review of Systems   Endocrine:        Numbness and tingling sensation both feet and both hands and around her mouth    All other systems reviewed and are negative  Past Medical and Surgical History:     Past Medical History:   Diagnosis Date    Hypokalemia        Past Surgical History:   Procedure Laterality Date    ABDOMINAL SURGERY      APPENDECTOMY      CHOLECYSTECTOMY      GASTRIC BYPASS      HYSTERECTOMY         Meds/Allergies:    Prior to Admission medications    Medication Sig Start Date End Date Taking?  Authorizing Provider   amitriptyline (ELAVIL) 100 mg tablet Take 25 mg by mouth daily at bedtime    Historical Provider, MD   butalbital-acetaminophen-caffeine (FIORICET,ESGIC) -40 mg per tablet Take 1 tablet by mouth every 4 (four) hours as needed for headaches 7/15/18   Alma Ferguson MD   calcium carbonate-vitamin D (OSCAL-D) 500 mg-200 units per tablet Take 1 tablet by mouth 2 (two) times a day with meals 7/10/18   Monica Ventura DO   cholecalciferol (VITAMIN D3) 1,000 units tablet Take 1 tablet (1,000 Units total) by mouth daily 7/11/18   Hollice Barley, DO   cyanocobalamin 500 MCG tablet Take 1 tablet (500 mcg total) by mouth daily 7/11/18   Hollice Barley, DO   meclizine (ANTIVERT) 25 mg tablet Take 1 tablet (25 mg total) by mouth every 12 (twelve) hours as needed for dizziness 7/10/18   Hollice Barley, DO   Multiple Vitamins-Minerals (MULTIVITAMIN WITH MINERALS) tablet Take 1 tablet by mouth daily 7/15/18   Umang Mtz MD   potassium chloride (K-DUR,KLOR-CON) 20 mEq tablet Take 1 tablet (20 mEq total) by mouth daily 7/11/18   Hollice Barley, DO   topiramate (TOPAMAX) 50 MG tablet Take 25 mg by mouth every 12 (twelve) hours    Historical Provider, MD     I have reviewed home medications with patient personally  Allergies: Allergies   Allergen Reactions    Nsaids      Other reaction(s): Other (Please comment)  Should not take s/p bariatric surgery       Social History:     Marital Status: /Civil Union   Occupation: non contributory  Patient Pre-hospital Living Situation: independent  Patient Pre-hospital Level of Mobility: independent  Patient Pre-hospital Diet Restrictions: none  Substance Use History:   History   Alcohol Use    Yes     Comment: socially     History   Smoking Status    Never Smoker   Smokeless Tobacco    Never Used     History   Drug Use No       Family History:    non-contributory    Physical Exam:     Vitals:   Blood Pressure: 103/54 (08/15/18 2146)  Pulse: 76 (08/15/18 2146)  Temperature: 97 7 °F (36 5 °C) (08/15/18 1919)  Temp Source: Temporal (08/15/18 1919)  Respirations: 14 (08/15/18 2146)  Weight - Scale: 68 3 kg (150 lb 9 6 oz) (08/15/18 1919)  SpO2: 98 % (08/15/18 2146)    Physical Exam   Constitutional: She is oriented to person, place, and time  She appears well-developed and well-nourished  No distress  HENT:   Head: Normocephalic and atraumatic  Eyes: Conjunctivae are normal  Pupils are equal, round, and reactive to light  Right eye exhibits no discharge   Left eye exhibits no discharge  No scleral icterus  Neck: Normal range of motion  Neck supple  No JVD present  No tracheal deviation present  No thyromegaly present  Cardiovascular: Normal rate, regular rhythm and intact distal pulses  Exam reveals no gallop and no friction rub  No murmur heard  Pulmonary/Chest: No respiratory distress  She has no wheezes  She has no rales  Abdominal: Soft  Bowel sounds are normal  She exhibits no distension  There is no tenderness  There is no rebound  Musculoskeletal: Normal range of motion  She exhibits no edema, tenderness or deformity  Right shoulder: She exhibits no tenderness, no bony tenderness, no swelling, no effusion, no pain, no spasm and normal strength  Neurological: She is alert and oriented to person, place, and time  No cranial nerve deficit  Coordination normal    Skin: Skin is warm and dry  No rash noted  She is not diaphoretic  No erythema  No pallor  Psychiatric: She has a normal mood and affect  Her behavior is normal  Judgment and thought content normal            Additional Data:     Lab Results: I have personally reviewed pertinent reports  Results from last 7 days  Lab Units 08/15/18  2015   WBC Thousand/uL 8 41   HEMOGLOBIN g/dL 11 5   HEMATOCRIT % 35 7   PLATELETS Thousands/uL 308   NEUTROS PCT % 62   LYMPHS PCT % 29   MONOS PCT % 6   EOS PCT % 2       Results from last 7 days  Lab Units 08/15/18  2015   SODIUM mmol/L 141   POTASSIUM mmol/L 2 7*   CHLORIDE mmol/L 110*   CO2 mmol/L 26   BUN mg/dL 18   CREATININE mg/dL 1 00   CALCIUM mg/dL 8 2*   TOTAL PROTEIN g/dL 6 6   BILIRUBIN TOTAL mg/dL 0 10*   ALK PHOS U/L 57   ALT U/L 32   AST U/L 30   GLUCOSE RANDOM mg/dL 100           Imaging: I have personally reviewed pertinent reports  Ct Abdomen Pelvis With Contrast    Result Date: 7/30/2018  Narrative: CT ABDOMEN AND PELVIS WITH IV CONTRAST INDICATION:   bilateral flank pain  COMPARISON:  None   TECHNIQUE:  CT examination of the abdomen and pelvis was performed  Axial, sagittal, and coronal 2D reformatted images were created from the source data and submitted for interpretation  Radiation dose length product (DLP) for this visit:  259 79 mGy-cm   This examination, like all CT scans performed in the [facility], was performed utilizing techniques to minimize radiation dose exposure, including the use of iterative  reconstruction and automated exposure control  IV Contrast:  100 mL of iohexol (OMNIPAQUE) Enteric Contrast:  Enteric contrast was not administered  FINDINGS: ABDOMEN LOWER CHEST:  No clinically significant abnormality identified in the visualized lower chest  LIVER/BILIARY TREE:  Unremarkable  GALLBLADDER:  Gallbladder is surgically absent  SPLEEN:  Unremarkable  PANCREAS:  Unremarkable  ADRENAL GLANDS:  Unremarkable  KIDNEYS/URETERS:  Unremarkable  No hydronephrosis  STOMACH AND BOWEL:  Postoperative changes of prior gastric bypass surgery are noted  There is liquid stool noted throughout colon  There is no evidence of large or small bowel obstruction  APPENDIX:  The appendix is surgically absent ABDOMINOPELVIC CAVITY:  No ascites or free intraperitoneal air  No lymphadenopathy  VESSELS:  Unremarkable for patient's age  PELVIS REPRODUCTIVE ORGANS:  Patient is status post hysterectomy  There is an involuting right ovarian cyst measuring 18 mm  URINARY BLADDER:  Unremarkable  ABDOMINAL WALL/INGUINAL REGIONS:  Unremarkable  OSSEOUS STRUCTURES:  No acute fracture or destructive osseous lesion  Impression: Liquid stool noted throughout the colon suggestive of a gastroenteritis/diarrhea  No free air or free fluid  No hydronephrosis  Postoperative changes of prior gastric bypass surgery, cholecystectomy, appendectomy and hysterectomy   Workstation performed: [id number]       EKG, Pathology, and Other Studies Reviewed on Admission:   · EKG: reviewed     Allscripts / Epic Records Reviewed: Yes     ** Please Note: This note has been constructed using a voice recognition system   **

## 2018-08-16 NOTE — PROGRESS NOTES
Progress Note - Tiffany Young 1985, 28 y o  female MRN: [id number]    Unit/Bed#: 420-01 Encounter: [id number]    Primary Care Provider: Jeanne Bamberger, MD   Date and time admitted to hospital: 8/15/2018  7:27 PM        Paresthesia   Assessment & Plan    iodized calcium WNL, Vit D panel pending  No loss of sensation - feels numb and tingly, good strength through out 5/5  MRI C spine and MRI brain from 7/18 reviewed, unremarkable, however no paresthesias or LUE weakness at that time  Will repeat MRI brain - suggest MS specific  Of note: CSF myelin basic protein was pending at the time of last discharge, and came back elevated  Indicating probable MS  Will likely need to d/w neurology on an inpatient basis    Will reassess improvement of symptoms with potassium repletion  Hypocalcemia   Assessment & Plan    Ordered ionized calcium and Vitamin D panel  Monitor BMP  Continual cardiac monitoring         Migraine without aura and without status migrainosus, not intractable   Assessment & Plan    History of migraine HA  Continue topamax, amitriptyline  Fioricet as needed  History of gastric bypass   Assessment & Plan    Monitor electrolytes   Continue PTA Vitamin   Possibly contributing to malabsorption? * Hypokalemia   Assessment & Plan    Recurrent episode of severe hypokalemia  Pat complains of paresthesia in b/l le and b/l ue in addition to around her mouth   Continue to replete potassium with potassium elixir vs  Tablets  Urine sodium and potassium pending  Continual cardiac monitoring  VS per protocol  Nephrology consult appreciated  VTE Pharmacologic Prophylaxis:   Pharmacologic: ambulatory, low risk  Mechanical VTE Prophylaxis in Place: No      Time Spent for Care: 30 minutes  More than 50% of total time spent on counseling and coordination of care as described above      Current Length of Stay: 0 day(s)    Current Patient Status: Inpatient   Certification Statement: The patient will continue to require additional inpatient hospital stay due to need for close monitoring of labwork     Discharge Plan / Estimated Discharge Date: TBD      Code Status: Level 1 - Full Code      Subjective:   Patient feeling less facial paresthesias  Left hand is weak at times, still tingling/numbness of the bilateral hands and feet  No palpitations  Objective:     Vitals:   Temp (24hrs), Av 6 °F (36 4 °C), Min:97 5 °F (36 4 °C), Max:97 6 °F (36 4 °C)    HR:  [67-87] 70  Resp:  [14-18] 18  BP: ()/(48-56) 99/48  SpO2:  [98 %-100 %] 100 %  Body mass index is 28 83 kg/m²  Input and Output Summary (last 24 hours): Intake/Output Summary (Last 24 hours) at 18  Last data filed at 18 1759   Gross per 24 hour   Intake             1610 ml   Output               50 ml   Net             1560 ml       Physical Exam:     Physical Exam   Constitutional: She is oriented to person, place, and time  She appears well-developed and well-nourished  No distress  HENT:   Mouth/Throat: Oropharynx is clear and moist    Neck: Neck supple  Cardiovascular: Normal rate and normal heart sounds  No murmur heard  Pulmonary/Chest: Effort normal  No respiratory distress  She has no wheezes  She has no rales  Abdominal: Soft  She exhibits no distension  There is no tenderness  Musculoskeletal: She exhibits no edema  Neurological: She is alert and oriented to person, place, and time  No cranial nerve deficit  Coordination normal    Skin: Skin is warm and dry  She is not diaphoretic  Psychiatric: She has a normal mood and affect  Nursing note and vitals reviewed          Additional Data:   Labs:    Results from last 7 days  Lab Units 18  0518   WBC Thousand/uL 6 66   HEMOGLOBIN g/dL 10 1*   HEMATOCRIT % 30 7*   PLATELETS Thousands/uL 249   NEUTROS PCT % 57   LYMPHS PCT % 32   MONOS PCT % 8   EOS PCT % 2       Results from last 7 days  Lab Units 18  1754 08/15/18  2015   SODIUM mmol/L 139  < > 141   POTASSIUM mmol/L 3 8  < > 2 7*   CHLORIDE mmol/L 113*  < > 110*   CO2 mmol/L 15*  < > 26   BUN mg/dL 14  < > 18   CREATININE mg/dL 1 00  < > 1 00   CALCIUM mg/dL 7 5*  < > 8 2*   TOTAL PROTEIN g/dL  --   --  6 6   BILIRUBIN TOTAL mg/dL  --   --  0 10*   ALK PHOS U/L  --   --  57   ALT U/L  --   --  32   AST U/L  --   --  30   GLUCOSE RANDOM mg/dL 118  < > 100   < > = values in this interval not displayed  * I Have Reviewed All Lab Data Listed Above  * Additional Pertinent Lab Tests Reviewed: All Labs Within Last 24 Hours Reviewed    Imaging:  Imaging Reports Reviewed Today Include: none        Recent Cultures (last 7 days):           Last 24 Hours Medication List:     Current Facility-Administered Medications:  amitriptyline 25 mg Oral HS Anusha Y Swank, ARIADNA   butalbital-acetaminophen-caffeine 1 tablet Oral Q4H PRN ARIADNA Dejesus   calcium carbonate-vitamin D 1 tablet Oral BID With Meals ARIADNA Dejesus   cholecalciferol 1,000 Units Oral Daily Anusha Y Swank, ARIADNA   cyanocobalamin 500 mcg Oral Daily Anusha Y Swank, ARIADNA   meclizine 25 mg Oral Q12H PRN ARIADNA Dejesus   multivitamin-minerals 1 tablet Oral Daily Anusha Y Swank, ARIADNA   potassium chloride 40 mEq Oral BID Estonian Ramona, DO   potassium-sodium phosphateS 1 tablet Oral 4x Daily (with meals and at bedtime) ARIADNA Dejesus   topiramate 25 mg Oral Q12H [facility] ARIADNA Dejesus        Today, Patient Was Seen By: Cedrick Charles PA-C    ** Please Note: Dragon 360 Dictation voice to text software may have been used in the creation of this document   **

## 2018-08-16 NOTE — ASSESSMENT & PLAN NOTE
Ordered iodized calcium and Vit D panel   No loss of sensation - feels numb and tingly, good strength through out 5/5  MRI C spine reviewed from 7/11/18 - "Normal appearance of the cervical spinal cord  No cord compression or cord signal abnormality    No significant central or foraminal narrowing "

## 2018-08-16 NOTE — ASSESSMENT & PLAN NOTE
iodized calcium WNL, Vit D panel pending  No loss of sensation - feels numb and tingly, good strength through out 5/5  MRI C spine and MRI brain from 7/18 reviewed, unremarkable, however no paresthesias or LUE weakness at that time  Will repeat MRI brain - suggest MS specific  Of note: CSF myelin basic protein was pending at the time of last discharge, and came back elevated  Indicating probable MS  Will likely need to d/w neurology on an inpatient basis    Will reassess improvement of symptoms with potassium repletion

## 2018-08-16 NOTE — PLAN OF CARE
Problem: OCCUPATIONAL THERAPY ADULT  Goal: Performs self-care activities at highest level of function for planned discharge setting  See evaluation for individualized goals  Outcome: Completed Date Met: 08/16/18        Assessment: Pt is a 29 y/o female admitted d/t hypokalemia  Pt presents at an Independent level at time of eval with completion of self cares, functional transfers, and functional mobility  Pt demonstates good UE strength and normal ROM of b/l UE  Pt denies any weakness and reports feelings of numbness/tingling in LE as well as facial numbness  Pt reports spasticity at times for the L hand, however, none noted at time of eval  Pt able to complete functional mobility w/out an AD w/ no LOB or fatigue  Skilled therapy services not indicated at this time for pt           OT - OK to Discharge: Yes (when medically cleared)

## 2018-08-16 NOTE — ASSESSMENT & PLAN NOTE
Recurrent episode of hypokalemia- K-2 7 today -   Pat complains of paresthesia in b/l le and b/l ue in addition to around her mouth   Potassium was repleted in the ER collect new bmp @ 0000 and tomorrow am 0600   Continual cardiac monitoring  VS per protocol  Nephrology consulted

## 2018-08-16 NOTE — ASSESSMENT & PLAN NOTE
Recurrent episode of severe hypokalemia  Pat complains of paresthesia in b/l le and b/l ue in addition to around her mouth   Continue to replete potassium with potassium elixir vs  Tablets  Urine sodium and potassium pending  Continual cardiac monitoring  VS per protocol  Nephrology consult appreciated

## 2018-08-16 NOTE — SOCIAL WORK
Cm met with the patient to evaluate the patients prior function and living situation and any barriers to d/c and form a safe d/c plan  Cm also evaluated the patient for any services in the home or needs for services  Pt resides at home with her spouse in a house  [address], on one floor  She is independent with her adls and ambulation  No services or DME  Pt drives  PCp is Alethea Ochoa and pharmacy is [facility]  Plans at this time are home on dc with outpatient follow up  Cm will continue to follow and assist in dc planning

## 2018-08-17 VITALS
BODY MASS INDEX: 28.4 KG/M2 | WEIGHT: 154.32 LBS | HEIGHT: 62 IN | DIASTOLIC BLOOD PRESSURE: 52 MMHG | OXYGEN SATURATION: 100 % | SYSTOLIC BLOOD PRESSURE: 101 MMHG | HEART RATE: 79 BPM | TEMPERATURE: 97.2 F | RESPIRATION RATE: 17 BRPM

## 2018-08-17 LAB
ALBUMIN SERPL BCP-MCNC: 2.6 G/DL (ref 3.5–5)
ALP SERPL-CCNC: 40 U/L (ref 46–116)
ALT SERPL W P-5'-P-CCNC: 32 U/L (ref 12–78)
ANION GAP SERPL CALCULATED.3IONS-SCNC: 8 MMOL/L (ref 4–13)
AST SERPL W P-5'-P-CCNC: 32 U/L (ref 5–45)
BASOPHILS # BLD AUTO: 0.04 THOUSANDS/ΜL (ref 0–0.1)
BASOPHILS NFR BLD AUTO: 1 % (ref 0–1)
BILIRUB SERPL-MCNC: 0.2 MG/DL (ref 0.2–1)
BUN SERPL-MCNC: 11 MG/DL (ref 5–25)
CALCIUM SERPL-MCNC: 7.8 MG/DL (ref 8.3–10.1)
CHLORIDE SERPL-SCNC: 113 MMOL/L (ref 100–108)
CO2 SERPL-SCNC: 21 MMOL/L (ref 21–32)
CORTIS SERPL-MCNC: 6.8 UG/DL
CREAT SERPL-MCNC: 0.79 MG/DL (ref 0.6–1.3)
EOSINOPHIL # BLD AUTO: 0.19 THOUSAND/ΜL (ref 0–0.61)
EOSINOPHIL NFR BLD AUTO: 3 % (ref 0–6)
ERYTHROCYTE [DISTWIDTH] IN BLOOD BY AUTOMATED COUNT: 14.7 % (ref 11.6–15.1)
GFR SERPL CREATININE-BSD FRML MDRD: 98 ML/MIN/1.73SQ M
GLUCOSE SERPL-MCNC: 80 MG/DL (ref 65–140)
HCT VFR BLD AUTO: 34.7 % (ref 34.8–46.1)
HGB BLD-MCNC: 11 G/DL (ref 11.5–15.4)
IMM GRANULOCYTES # BLD AUTO: 0.02 THOUSAND/UL (ref 0–0.2)
IMM GRANULOCYTES NFR BLD AUTO: 0 % (ref 0–2)
INR PPP: 1.01 (ref 0.86–1.17)
LYMPHOCYTES # BLD AUTO: 2.2 THOUSANDS/ΜL (ref 0.6–4.47)
LYMPHOCYTES NFR BLD AUTO: 37 % (ref 14–44)
MAGNESIUM SERPL-MCNC: 2.1 MG/DL (ref 1.6–2.6)
MCH RBC QN AUTO: 29.6 PG (ref 26.8–34.3)
MCHC RBC AUTO-ENTMCNC: 31.7 G/DL (ref 31.4–37.4)
MCV RBC AUTO: 94 FL (ref 82–98)
MONOCYTES # BLD AUTO: 0.42 THOUSAND/ΜL (ref 0.17–1.22)
MONOCYTES NFR BLD AUTO: 7 % (ref 4–12)
NEUTROPHILS # BLD AUTO: 3.09 THOUSANDS/ΜL (ref 1.85–7.62)
NEUTS SEG NFR BLD AUTO: 52 % (ref 43–75)
NRBC BLD AUTO-RTO: 0 /100 WBCS
PHOSPHATE SERPL-MCNC: 3 MG/DL (ref 2.7–4.5)
PLATELET # BLD AUTO: 258 THOUSANDS/UL (ref 149–390)
PMV BLD AUTO: 10.5 FL (ref 8.9–12.7)
POTASSIUM SERPL-SCNC: 3.5 MMOL/L (ref 3.5–5.3)
PROT SERPL-MCNC: 5.5 G/DL (ref 6.4–8.2)
PROTHROMBIN TIME: 12.8 SECONDS (ref 11.8–14.2)
RBC # BLD AUTO: 3.71 MILLION/UL (ref 3.81–5.12)
SODIUM SERPL-SCNC: 142 MMOL/L (ref 136–145)
WBC # BLD AUTO: 5.96 THOUSAND/UL (ref 4.31–10.16)

## 2018-08-17 PROCEDURE — 99238 HOSP IP/OBS DSCHRG MGMT 30/<: CPT | Performed by: PHYSICIAN ASSISTANT

## 2018-08-17 PROCEDURE — 80053 COMPREHEN METABOLIC PANEL: CPT | Performed by: INTERNAL MEDICINE

## 2018-08-17 PROCEDURE — 85610 PROTHROMBIN TIME: CPT | Performed by: INTERNAL MEDICINE

## 2018-08-17 PROCEDURE — 82533 TOTAL CORTISOL: CPT | Performed by: INTERNAL MEDICINE

## 2018-08-17 PROCEDURE — 84100 ASSAY OF PHOSPHORUS: CPT | Performed by: INTERNAL MEDICINE

## 2018-08-17 PROCEDURE — 85025 COMPLETE CBC W/AUTO DIFF WBC: CPT | Performed by: INTERNAL MEDICINE

## 2018-08-17 PROCEDURE — 83735 ASSAY OF MAGNESIUM: CPT | Performed by: INTERNAL MEDICINE

## 2018-08-17 RX ORDER — POTASSIUM CHLORIDE 20MEQ/15ML
40 LIQUID (ML) ORAL 2 TIMES DAILY
Qty: 900 ML | Refills: 0 | Status: SHIPPED | OUTPATIENT
Start: 2018-08-17 | End: 2018-09-24 | Stop reason: HOSPADM

## 2018-08-17 RX ADMIN — POTASSIUM CHLORIDE 40 MEQ: 20 SOLUTION ORAL at 08:42

## 2018-08-17 RX ADMIN — CALCIUM CARBONATE-VITAMIN D TAB 500 MG-200 UNIT 1 TABLET: 500-200 TAB at 08:42

## 2018-08-17 RX ADMIN — TOPIRAMATE 25 MG: 25 TABLET, FILM COATED ORAL at 08:42

## 2018-08-17 RX ADMIN — DIBASIC SODIUM PHOSPHATE, MONOBASIC POTASSIUM PHOSPHATE AND MONOBASIC SODIUM PHOSPHATE 1 TABLET: 852; 155; 130 TABLET ORAL at 08:42

## 2018-08-17 RX ADMIN — CYANOCOBALAMIN TAB 500 MCG 500 MCG: 500 TAB at 08:42

## 2018-08-17 RX ADMIN — MULTIPLE VITAMINS W/ MINERALS TAB 1 TABLET: TAB at 08:42

## 2018-08-17 RX ADMIN — VITAMIN D, TAB 1000IU (100/BT) 1000 UNITS: 25 TAB at 08:42

## 2018-08-17 RX ADMIN — DIBASIC SODIUM PHOSPHATE, MONOBASIC POTASSIUM PHOSPHATE AND MONOBASIC SODIUM PHOSPHATE 1 TABLET: 852; 155; 130 TABLET ORAL at 13:18

## 2018-08-17 NOTE — SOCIAL WORK
Scheduled pt's follow up appointment with neurology and put on AVS  Appointment will be August 22nd at 11:15 am

## 2018-08-17 NOTE — ASSESSMENT & PLAN NOTE
Recurrent episode of severe hypokalemia  Pat complains of paresthesia in b/l le and b/l ue in addition to around her mouth   The patient's potassium level was noted to be 3 5 on the day of discharge  She was given a script for PO potassium elixir 40 mEq daily  She will need repeat labs and follow up with her PCP as well as nephrology

## 2018-08-17 NOTE — ASSESSMENT & PLAN NOTE
The patient was noted to have a phosphorus level of 2 3  She received PO supplementation and this resolved  She was discharged home of PO supplementation and was instructed to follow up with Nephrology and/or PCP to review repeat labs

## 2018-08-17 NOTE — NURSING NOTE
On 1st assessment at 80, patient c/o and demonstrated a tightly clenched left hand  She stated this contraction has been happening over the last few days  He fingers were tight and difficult to extend  On next assessment, around 2200, patient's hand was open and non-contracted

## 2018-08-17 NOTE — DISCHARGE SUMMARY
Discharge- Toy Loud 1985, 28 y o  female MRN: [id number]    Unit/Bed#: 420-01 Encounter: [id number]    Primary Care Provider: Tye Castillo MD   Date and time admitted to hospital: 8/15/2018  7:27 PM        * Paresthesia   Assessment & Plan    The patient presented to the emergency department due to intermittent paresthesias of her upper and lower extremities  The patient has been admitted to the hospital for these symptoms previously, she was noted to have hypokalemia at that time which was thought to be the cause initially  The patient was transferred to [place] [address] during her 2nd admission for continued workup with spinal tab and neurology evaluation  After that discharge labs were still pending  During this admission she was noted to have a potassium of 2 8  Also CSF myelin basic protein which was pending at previous discharge was noted to be elevated at 4 5  On the day of discharge the patient's paraesthesias were improving thus she was discharged home and given an appointment with Neurology PA on August 22 to discuss positive test results and if an MRI brain with MS protocol is needed to rule out multiple sclerosis  The patient was also instructed to follow up with her PCP within 1 week  Hypocalcemia   Assessment & Plan    Ionized calcium within normal limits  Vitamin D panel was pending at discharge  The patient was instructed to follow up with her PCP within 1 week  Migraine without aura and without status migrainosus, not intractable   Assessment & Plan    History of migraine HA  Continue topamax, amitriptyline  Fioricet as needed  History of gastric bypass   Assessment & Plan    Monitor electrolytes   Continue PTA Vitamin   Possibly contributing to malabsorption? Hypokalemia   Assessment & Plan    Recurrent episode of severe hypokalemia    Pat complains of paresthesia in b/l le and b/l ue in addition to around her mouth   The patient's potassium level was noted to be 3 5 on the day of discharge  She was given a script for PO potassium elixir 40 mEq daily  She will need repeat labs and follow up with her PCP as well as nephrology  Hypophosphatemia   Assessment & Plan    The patient was noted to have a phosphorus level of 2 3  She received PO supplementation and this resolved  She was discharged home of PO supplementation and was instructed to follow up with Nephrology and/or PCP to review repeat labs  Discharging Physician / Practitioner: Linnette Perez PA-C  PCP: Temi Reese MD  Admission Date:   Admission Orders     Ordered        08/16/18 1322  Inpatient Admission  Once         08/15/18 2156  Place in Observation  Once             Discharge Date: 08/17/18    Resolved Problems  Date Reviewed: 8/17/2018    None          Consultations During Hospital Stay:  · Nephrology    Procedures Performed:     · none    Significant Findings / Test Results:     · Potassium 2 7, phosphorus 2 3    Incidental Findings:   · none     Test Results Pending at Discharge (will require follow up):   · none     Outpatient Tests Requested:  · Repeat BMP and phosphorus: patient to follow up with Nephrology and/or PCP regarding results  Complications:  none    Reason for Admission: paraesthesias     Hospital Course: Vega Diggs is a 28 y o  female patient who originally presented to the hospital on 8/15/2018 due to paresthesia in b/l le and bl u/e and around her mouth  She reports that she has been having an ongoing issue with loosing potassium and has had several visits to the ER for replacement  Chart indicates that had additional testing including  MRI and lumbar puncture - which did not reveal an etiology for her paresthesias  Diagnosis to date appears to be secondary to  multifactorial d/t  history of gastric bypass surgery and recent dietary changes   Chart also indicates that again she was seen in  ER  On 8/13/18 again with bilateral lower extremity paresthesias and found again to be hypokalemic with potassium 2 8    Potassium IV repletion was completed in the ED with and an increase in oral potassium intake at home and began spironolactone at the direction of her nephrologist of which she has taken 2 doses to date  Please see above list of diagnoses and related plan for additional information  Condition at Discharge: good     Discharge Day Visit / Exam:     Subjective:    Vitals: Blood Pressure: 101/52 (08/17/18 0716)  Pulse: 79 (08/17/18 0716)  Temperature: (!) 97 2 °F (36 2 °C) (08/17/18 0716)  Temp Source: Temporal (08/17/18 0716)  Respirations: 17 (08/17/18 0716)  Height: 5' 2" (157 5 cm) (08/16/18 1458)  Weight - Scale: 70 kg (154 lb 5 2 oz) (08/17/18 0600)  SpO2: 100 % (08/17/18 0716)  Exam:   Physical Exam   Constitutional: She is oriented to person, place, and time  She appears well-developed and well-nourished  HENT:   Head: Normocephalic and atraumatic  Cardiovascular: Normal rate and regular rhythm  Pulmonary/Chest: Effort normal and breath sounds normal  No respiratory distress  She has no wheezes  She has no rales  Abdominal: Soft  Bowel sounds are normal  She exhibits no distension  There is no rebound  Musculoskeletal: Normal range of motion  Neurological: She is alert and oriented to person, place, and time  No cranial nerve deficit  Skin: Skin is warm and dry  Nursing note and vitals reviewed  Discussion with Family: n/a    Discharge instructions/Information to patient and family:   See after visit summary for information provided to patient and family  Provisions for Follow-Up Care:  See after visit summary for information related to follow-up care and any pertinent home health orders        Disposition:     Home    For Discharges to [facility] SNF:   · Not Applicable to this Patient - Not Applicable to this Patient    Planned Readmission: no     Discharge Statement:  I spent 25 minutes discharging the patient  This time was spent on the day of discharge  I had direct contact with the patient on the day of discharge  Greater than 50% of the total time was spent examining patient, answering all patient questions, arranging and discussing plan of care with patient as well as directly providing post-discharge instructions  Additional time then spent on discharge activities  Discharge Medications:  See after visit summary for reconciled discharge medications provided to patient and family        ** Please Note: This note has been constructed using a voice recognition system **

## 2018-08-17 NOTE — ASSESSMENT & PLAN NOTE
The patient presented to the emergency department due to intermittent paresthesias of her upper and lower extremities  The patient has been admitted to the hospital for these symptoms previously, she was noted to have hypokalemia at that time which was thought to be the cause initially  The patient was transferred to [place] [address]  during her 2nd admission for continued workup with spinal tab and neurology evaluation  After that discharge labs were still pending  During this admission she was noted to have a potassium of 2 8  Also CSF myelin basic protein which was pending at previous discharge was noted to be elevated at 4 5  On the day of discharge the patient's paraesthesias were improving thus she was discharged home and given an appointment with Neurology PA on August 22 to discuss positive test results and if an MRI brain with MS protocol is needed to rule out multiple sclerosis  The patient was also instructed to follow up with her PCP within 1 week

## 2018-08-17 NOTE — DISCHARGE INSTR - APPOINTMENTS
[facility]  [address] [facility] [address]  Wednesday August 22nd at 11:15 am with Monique Oshea PA-C

## 2018-08-17 NOTE — PROGRESS NOTES
Progress Note - Nephrology   La Jones 28 y o  female MRN: [id number]  Unit/Bed#: 420-01 Encounter: [id number]    A/P:  1  Hypokalemia               potassium level more appropriate on oral potassium elixir supplementation  Patient's urine potassium level was appropriately low  It appears that the patient was most likely not absorbing her potassium in tab form, but appears to be absorbing it much better in liquid form  Continue the patient dosing 80 mEq p  o  daily  We will need to have close follow-up in the outpatient setting  2   Hypophosphatemia-likely due to dietary insufficiency               patient was supplemented yesterday, follow-up blood work from this morning  A phosphorus level was added to specimen in lab  3   Gastric bypass status  4  Paresthesias-likely electrolyte related               Electrolytes are warm more appropriate this morning, patient continues to have some paresthesias  Will await neuro evaluation regarding potential multiple sclerosis  Follow up reason for today's visit:  Hypokalemia/hypophosphatemia    Hypokalemia    Patient Active Problem List   Diagnosis    Hypokalemia    History of gastric bypass    Migraine without aura and without status migrainosus, not intractable    Left-sided weakness    Hypocalcemia    Anemia    Vitamin D deficiency    Weakness of both lower extremities    Weakness of both upper extremities    Elevated CPK    Vitamin B12 deficiency    Cervical lymphadenopathy    Generalized weakness    Paresthesia of both lower extremities    Paresthesia         Subjective:   Patient continues to experience paresthesias in the upper and lower extremities, denies perioral paresthesias  Objective:     Vitals: Blood pressure (!) 97/49, pulse 82, temperature (!) 97 4 °F (36 3 °C), temperature source Temporal, resp  rate 18, height 5' 2" (1 575 m), weight 70 kg (154 lb 5 2 oz), SpO2 99 %  ,Body mass index is 28 23 kg/m²      Weight (last 2 days) Date/Time   Weight    08/17/18 0600  70 (154 32)    08/16/18 1458  71 5 (157 63)    08/16/18 0716  72 3 (159 39)    08/15/18 1919  68 3 (150 6)                Intake/Output Summary (Last 24 hours) at 08/17/18 0716  Last data filed at 08/16/18 2030   Gross per 24 hour   Intake              460 ml   Output              400 ml   Net               60 ml     I/O last 3 completed shifts: In: 65 [P O :360; IV Piggyback:1250]  Out: 400 [Urine:400]         Physical Exam: BP (!) 97/49 (BP Location: Left arm)   Pulse 82   Temp (!) 97 4 °F (36 3 °C) (Temporal)   Resp 18   Ht 5' 2" (1 575 m)   Wt 70 kg (154 lb 5 2 oz)   SpO2 99%   BMI 28 23 kg/m²     General Appearance:    Alert, cooperative, no distress, appears stated age   Head:    Normocephalic, without obvious abnormality, atraumatic   Eyes:    Conjunctiva/corneas clear   Ears:    Normal external ears   Nose:   Nares normal, septum midline, mucosa normal, no drainage    or sinus tenderness   Throat:   Lips, mucosa, and tongue normal; teeth and gums normal   Neck:   Supple   Back:     Symmetric, no curvature, ROM normal, no CVA tenderness   Lungs:     Clear to auscultation bilaterally, respirations unlabored   Chest wall:    No tenderness or deformity   Heart:    Regular rate and rhythm, S1 and S2 normal, no murmur, rub   or gallop   Abdomen:     Soft, non-tender, bowel sounds active   Extremities:   Extremities normal, atraumatic, no cyanosis or edema   Skin:   Skin color, texture, turgor normal, no rashes or lesions   Lymph nodes:   Cervical normal   Neurologic:   CNII-XII intact            Lab, Imaging and other studies: I have personally reviewed pertinent labs    CBC: Lab Results   Component Value Date    WBC 5 96 08/17/2018    HGB 11 0 (L) 08/17/2018    HCT 34 7 (L) 08/17/2018    MCV 94 08/17/2018     08/17/2018    MCH 29 6 08/17/2018    MCHC 31 7 08/17/2018    RDW 14 7 08/17/2018    MPV 10 5 08/17/2018    NRBC 0 08/17/2018     CMP: Lab Results Component Value Date     08/17/2018    K 3 5 08/17/2018     (H) 08/17/2018    CO2 21 08/17/2018    ANIONGAP 8 08/17/2018    BUN 11 08/17/2018    CREATININE 0 79 08/17/2018    GLUCOSE 80 08/17/2018    CALCIUM 7 8 (L) 08/17/2018    AST 32 08/17/2018    ALT 32 08/17/2018    ALKPHOS 40 (L) 08/17/2018    PROT 5 5 (L) 08/17/2018    BILITOT 0 20 08/17/2018    EGFR 98 08/17/2018         Results from last 7 days  Lab Units 08/17/18  0527 08/16/18  1754 08/16/18  0518 08/15/18  2015 08/13/18  1559   SODIUM mmol/L 142 139 142 141 135*   POTASSIUM mmol/L 3 5 3 8 2 9* 2 7* 2 6*   CHLORIDE mmol/L 113* 113* 112* 110* 100   CO2 mmol/L 21 15* 22 26 29   BUN mg/dL 11 14 14 18 15   CREATININE mg/dL 0 79 1 00 0 80 1 00 0 93   CALCIUM mg/dL 7 8* 7 5* 7 8* 8 2* 8 4   TOTAL PROTEIN g/dL 5 5*  --   --  6 6 7 3   BILIRUBIN TOTAL mg/dL 0 20  --   --  0 10* 0 20   ALK PHOS U/L 40*  --   --  57 62   ALT U/L 32  --   --  32 27   AST U/L 32  --   --  30 39   GLUCOSE RANDOM mg/dL 80 118 83 100 48*         Phosphorus: No results found for: PHOS  Magnesium:   Lab Results   Component Value Date    MG 2 1 08/17/2018     Urinalysis: Lab Results   Component Value Date    COLORU Yellow 08/16/2018    CLARITYU Clear 08/16/2018    SPECGRAV 1 020 08/16/2018    PHUR 6 0 08/16/2018    LEUKOCYTESUR Negative 08/16/2018    NITRITE Negative 08/16/2018    PROTEINUA Negative 08/16/2018    GLUCOSEU Negative 08/16/2018    KETONESU Negative 08/16/2018    BILIRUBINUR Negative 08/16/2018    BLOODU Negative 08/16/2018     Ionized Calcium: No results found for: CAION  Coagulation: Lab Results   Component Value Date    INR 1 01 08/17/2018     Troponin: No results found for: TROPONINI  ABG: No results found for: PHART, SQG9KFM, PO2ART, KBF4NBJ, O7RBTYBO, BEART, SOURCE  Radiology review:     IMAGING  No results found      Current Facility-Administered Medications   Medication Dose Route Frequency    amitriptyline (ELAVIL) tablet 25 mg  25 mg Oral HS    butalbital-acetaminophen-caffeine (FIORICET,ESGIC) -40 mg per tablet 1 tablet  1 tablet Oral Q4H PRN    calcium carbonate-vitamin D (OSCAL-D) 500 mg-200 units per tablet 1 tablet  1 tablet Oral BID With Meals    cholecalciferol (VITAMIN D3) tablet 1,000 Units  1,000 Units Oral Daily    cyanocobalamin (VITAMIN B-12) tablet 500 mcg  500 mcg Oral Daily    meclizine (ANTIVERT) tablet 25 mg  25 mg Oral Q12H PRN    multivitamin-minerals (CENTRUM) tablet 1 tablet  1 tablet Oral Daily    potassium chloride 10 % oral solution 40 mEq  40 mEq Oral BID    potassium-sodium phosphateS (K-PHOS,PHOSPHA 250) -250 mg tablet 1 tablet  1 tablet Oral 4x Daily (with meals and at bedtime)    topiramate (TOPAMAX) tablet 25 mg  25 mg Oral Q12H [facility]     Medications Discontinued During This Encounter   Medication Reason    potassium chloride (K-DUR,KLOR-CON) CR tablet 40 mEq     potassium chloride (K-DUR,KLOR-CON) CR tablet 40 mEq        Jill Omalley DO

## 2018-08-17 NOTE — ASSESSMENT & PLAN NOTE
Ionized calcium within normal limits  Vitamin D panel was pending at discharge  The patient was instructed to follow up with her PCP within 1 week

## 2018-08-18 ENCOUNTER — HOSPITAL ENCOUNTER (EMERGENCY)
Facility: HOSPITAL | Age: 33
Discharge: HOME | End: 2018-08-18
Attending: EMERGENCY MEDICINE
Payer: COMMERCIAL

## 2018-08-18 VITALS
TEMPERATURE: 98.6 F | HEIGHT: 63 IN | WEIGHT: 159.17 LBS | RESPIRATION RATE: 18 BRPM | HEART RATE: 102 BPM | BODY MASS INDEX: 28.2 KG/M2 | DIASTOLIC BLOOD PRESSURE: 57 MMHG | SYSTOLIC BLOOD PRESSURE: 123 MMHG | OXYGEN SATURATION: 100 %

## 2018-08-18 DIAGNOSIS — L02.419 ABSCESS OF AXILLARY REGION: Primary | ICD-10-CM

## 2018-08-18 PROCEDURE — 99282 EMERGENCY DEPT VISIT SF MDM: CPT

## 2018-08-18 RX ORDER — LIDOCAINE HYDROCHLORIDE AND EPINEPHRINE 10; 10 MG/ML; UG/ML
10 INJECTION, SOLUTION INFILTRATION; PERINEURAL ONCE
Status: COMPLETED | OUTPATIENT
Start: 2018-08-18 | End: 2018-08-18

## 2018-08-18 RX ORDER — LIDOCAINE HYDROCHLORIDE AND EPINEPHRINE 10; 10 MG/ML; UG/ML
10 INJECTION, SOLUTION INFILTRATION; PERINEURAL ONCE
Status: DISCONTINUED | OUTPATIENT
Start: 2018-08-18 | End: 2018-08-18

## 2018-08-18 RX ADMIN — LIDOCAINE HYDROCHLORIDE,EPINEPHRINE BITARTRATE 10 ML: 10; .01 INJECTION, SOLUTION INFILTRATION; PERINEURAL at 15:27

## 2018-08-18 NOTE — DISCHARGE INSTRUCTIONS
Abscess Incision and Drainage   WHAT YOU SHOULD KNOW:   An abscess is an area under the skin where pus collects  An abscess incision and drainage (I and D) is a procedure to drain the pus  An abscess is most commonly caused by bacteria and can occur anywhere on the body  INSTRUCTIONS:   Medicines:   · Pain medicine: You may be given medicine to take away or decrease pain  Do not wait until the pain is severe before you take your medicine  · Take your medicine as directed  Call your healthcare provider if you think your medicine is not helping or if you have side effects  Tell him if you are allergic to any medicine  Keep a list of the medicines, vitamins, and herbs you take  Include the amounts, and when and why you take them  Bring the list or the pill bottles to follow-up visits  Carry your medicine list with you in case of an emergency  Wound care:   · Bandage:  Do not remove your bandage unless your primary healthcare provider [facility]) says it is okay  Keep the bandage clean and dry  Remove your bandage and clean the wound once your PHP gives you directions  · Packing:  Ask your PHP how to pack and change the gauze in your wound  Keep track of how many gauze dressings are inside the wound when you remove the packing  Do not pack more gauze than directed into the wound  This can damage the tissue  · Warm soaks:  Soak your abscess in warm, clean water for 20 to 30 minutes every day, for 1 week  Ask your PHP when to start warm soaks  Wear a splint:  You may need a splint if the abscess is on your arm, hand, or leg  A splint limits movement and helps your wound heal  Do not remove the splint until your first follow-up visit or as directed  Elevate your wound: If your wound is on your arm or leg, keep it raised above the level of your heart as often as you can  This will help reduce swelling  Prop your arm or leg on 2 pillows     Follow up with your primary healthcare provider or surgeon in 1 to 3 days:  Write down your questions so you remember to ask them during your visits  You may need to have your packing removed or your bandage changed  Contact your primary healthcare provider or surgeon if:   · You have a fever or chills  · You feel more tired than usual      · Your abscess returns  · You have questions or concerns about your procedure  Return to the emergency department if:   · The area around your abscess has red streaks or is warm and painful  · You are bleeding from your wound and cannot stop it  · You have back or stomach pain  · Your muscles or joints ache  © 2014 [address] is for End User's use only and may not be sold, redistributed or otherwise used for commercial purposes  All illustrations and images included in CareNotes® are the copyrighted property of A D [employer] M [employer]  or Iftikhar Rogers  The above information is an  only  It is not intended as medical advice for individual conditions or treatments  Talk to your doctor, nurse or pharmacist before following any medical regimen to see if it is safe and effective for you

## 2018-08-18 NOTE — ED PROVIDER NOTES
History  Chief Complaint   Patient presents with    Abscess     Patient noticed abscess under right arm yesterday        History provided by:  Patient and spouse  Abscess   Location:  Shoulder/arm  Shoulder/arm abscess location:  R axilla  Size:  2 cm  Abscess quality: induration, painful, redness and warmth    Red streaking: no    Duration:  2 days  Progression:  Worsening  Pain details:     Quality:  Pressure    Duration:  2 days    Timing:  Constant    Progression:  Worsening  Chronicity:  New (No previous hx of abscess)  Context: not diabetes, not injected drug use, not insect bite/sting and not skin injury    Context comment:  Patient hospitalized in this facility from 15-17 Aug for recurrent symptomatic hypokalemia; did not note any areas of erythema/induration while hospitalized   Relieved by:  Nothing  Worsened by:  Draining/squeezing  Ineffective treatments:  None tried  Associated symptoms: no anorexia, no fatigue and no fever    Associated symptoms comment:  No axillary lymphadenopathy  Risk factors: no family hx of MRSA, no hx of MRSA and no prior abscess    Risk factors comment:  No contact with anyone with similar sx      Prior to Admission Medications   Prescriptions Last Dose Informant Patient Reported? Taking?    Multiple Vitamins-Minerals (MULTIVITAMIN WITH MINERALS) tablet   No Yes   Sig: Take 1 tablet by mouth daily   amitriptyline (ELAVIL) 100 mg tablet   Yes Yes   Sig: Take 25 mg by mouth daily at bedtime   butalbital-acetaminophen-caffeine (FIORICET,ESGIC) -40 mg per tablet   No Yes   Sig: Take 1 tablet by mouth every 4 (four) hours as needed for headaches   calcium carbonate-vitamin D (OSCAL-D) 500 mg-200 units per tablet   No Yes   Sig: Take 1 tablet by mouth 2 (two) times a day with meals   cholecalciferol (VITAMIN D3) 1,000 units tablet   No Yes   Sig: Take 1 tablet (1,000 Units total) by mouth daily   cyanocobalamin 500 MCG tablet   No Yes   Sig: Take 1 tablet (500 mcg total) by mouth daily   meclizine (ANTIVERT) 25 mg tablet   No Yes   Sig: Take 1 tablet (25 mg total) by mouth every 12 (twelve) hours as needed for dizziness   potassium chloride 10 %   No Yes   Sig: Take 30 mL (40 mEq total) by mouth 2 (two) times a day   potassium-sodium phosphateS (K-PHOS,PHOSPHA 250) 155-852-130 mg tablet   No Yes   Sig: Take 1 tablet by mouth 4 (four) times a day (with meals and at bedtime)   topiramate (TOPAMAX) 50 MG tablet   Yes Yes   Sig: Take 25 mg by mouth every 12 (twelve) hours      Facility-Administered Medications: None       Past Medical History:   Diagnosis Date    Hypokalemia        Past Surgical History:   Procedure Laterality Date    ABDOMINAL SURGERY      APPENDECTOMY      CHOLECYSTECTOMY      GASTRIC BYPASS      HYSTERECTOMY         History reviewed  No pertinent family history  I have reviewed and agree with the history as documented  Social History   Substance Use Topics    Smoking status: Never Smoker    Smokeless tobacco: Never Used    Alcohol use Yes      Comment: socially        Review of Systems   Constitutional: Negative for chills, diaphoresis, fatigue and fever  Gastrointestinal: Negative for anorexia  Musculoskeletal: Negative for arthralgias and myalgias  Skin: Positive for color change  Negative for pallor, rash and wound  Neurological: Negative for weakness and numbness  Hematological: Negative for adenopathy  Does not bruise/bleed easily  Physical Exam  Physical Exam   Constitutional: She is oriented to person, place, and time  Vital signs are normal  She appears well-developed and well-nourished  She is active and cooperative  No distress  HENT:   Head: Normocephalic and atraumatic  Neck: Trachea normal and phonation normal    Cardiovascular: Normal rate, regular rhythm, intact distal pulses and normal pulses  Pulses:       Radial pulses are 2+ on the right side, and 2+ on the left side     Pulmonary/Chest: Effort normal  No respiratory distress  She exhibits swelling (Well-demarcated 2 cm erythematous indurated region of R axilla approximately at level of rib 4-5  No lymphangitis/crepitus/drainage  )  Lymphadenopathy:     She has no axillary adenopathy  Neurological: She is alert and oriented to person, place, and time  GCS eye subscore is 4  GCS verbal subscore is 5  GCS motor subscore is 6  Skin: Skin is warm, dry and intact  Capillary refill takes less than 2 seconds  She is not diaphoretic  There is erythema (Indurate region of R axilla as noted)  Nursing note and vitals reviewed  Vital Signs  ED Triage Vitals [08/18/18 1454]   Temperature Pulse Respirations Blood Pressure SpO2   98 6 °F (37 °C) 102 18 123/57 100 %      Temp Source Heart Rate Source Patient Position - Orthostatic VS BP Location FiO2 (%)   Temporal Monitor Sitting Right arm --      Pain Score       8           Vitals:    08/18/18 1454   BP: 123/57   Pulse: 102   Patient Position - Orthostatic VS: Sitting       Visual Acuity      ED Medications  Medications   lidocaine-epinephrine (XYLOCAINE/EPINEPHRINE) 1 %-1:100,000 injection 10 mL (10 mL Infiltration Given by Other 8/18/18 1527)       Diagnostic Studies  Results Reviewed     None                 No orders to display              Procedures  Incision/Drainage  Date/Time: 8/18/2018 4:32 PM  Performed by: Itzel Callejas  Authorized by: Itzel Callejas     Patient location:  ED  Consent:     Consent obtained:  Verbal    Consent given by:  Patient    Risks discussed:  Bleeding, incomplete drainage, infection and pain  Universal protocol:     Patient identity confirmed:  Verbally with patient and arm band  Location:     Type:  Abscess    Size:  2 cm    Location:  Trunk    Trunk location: R axilla  Pre-procedure details:     Skin preparation:  Betadine  Anesthesia (see MAR for exact dosages):      Anesthesia method:  Local infiltration    Local anesthetic:  Lidocaine 1% WITH epi (4 ml)  Procedure details: Complexity:  Simple    Needle aspiration: no      Incision types: Other (comment) (Loop drainage technique performed with paired 0 6 cm incisions via 11-blade scalpel)    Scalpel blade:  11    Incision depth:  Subcutaneous    Wound management:  Probed and deloculated    Drainage:  Purulent    Drainage amount:  Scant    Wound treatment:  Drain placed (Looped/tied penrose drain was placed)    Packing materials:  Penrose drain  Post-procedure details:     Patient tolerance of procedure: Tolerated well, no immediate complications    Soft tissue US  Date/Time: 8/18/2018 3:45 PM  Performed by: Sarita Toussaint  Authorized by: Sarita Toussaint     Patient location:  ED  Consent:     Consent obtained:  Verbal    Consent given by:  Patient    Risks discussed:  Pain  Indications:     Indications:  R axillary soft tissue swelling  Anesthesia (see MAR for exact dosages): Anesthesia method:  None  Procedure Detail:     Equipment used:  Linear high frequency    Procedure note (site, laterality, method, findings):  Irregularly margined approximately 3 cm X 1 cm subcutaneous fluid collection noted  Post-procedure details:     Patient tolerance of procedure: Tolerated well, no immediate complications           Phone Contacts  ED Phone Contact    ED Course       MDM  Number of Diagnoses or Management Options  Abscess of right axillary region: new and does not require workup     Amount and/or Complexity of Data Reviewed  Decide to obtain previous medical records or to obtain history from someone other than the patient: yes  Obtain history from someone other than the patient: yes  Review and summarize past medical records: yes    Risk of Complications, Morbidity, and/or Mortality  Presenting problems: moderate  Diagnostic procedures: moderate  Management options: moderate  General comments: Incision/drainage procedure completed without issue    Patient given wound care instructions with plan to follow up primary physician in 4 days for wound recheck  If she has complete resolution of any drainage from the wound she can remove the drain herself at home  ED return for any worsening including increasing erythema/fever/lymphangitis  All questions answered prior to discharge  The patient expressed understanding and agreed to plan  Patient Progress  Patient progress: improved    CritCare Time    Disposition  Final diagnoses:   Abscess of right axillary region     Time reflects when diagnosis was documented in both MDM as applicable and the Disposition within this note     Time User Action Codes Description Comment    8/18/2018  4:31 PM Stan Ontiveros Add [L02 419] Abscess of axillary region     8/18/2018  4:31 PM Stan Ontiveros Modify [L02 419] Abscess of right axillary region       ED Disposition     ED Disposition Condition Comment    Discharge  [facility] discharge to home/self care  Condition at discharge: Good        Follow-up Information     Follow up With Specialties Details Why José Giles MD Family Medicine Schedule an appointment as soon as possible for a visit in 4 days For wound re-check  Go back to the ER if you have increasing redness, swelling, fever, or increasing pain in the area of the abscess  [facility] [address]  [phone]            Patient's Medications   Discharge Prescriptions    No medications on file     No discharge procedures on file      ED Provider  Electronically Signed by           Val Carmona DO  08/18/18 4044

## 2018-08-22 ENCOUNTER — OFFICE VISIT (OUTPATIENT)
Dept: NEUROLOGY | Facility: CLINIC | Age: 33
End: 2018-08-22
Payer: COMMERCIAL

## 2018-08-22 VITALS
WEIGHT: 152 LBS | HEIGHT: 63 IN | SYSTOLIC BLOOD PRESSURE: 108 MMHG | HEART RATE: 79 BPM | DIASTOLIC BLOOD PRESSURE: 51 MMHG | BODY MASS INDEX: 26.93 KG/M2

## 2018-08-22 DIAGNOSIS — E87.6 HYPOKALEMIA: ICD-10-CM

## 2018-08-22 DIAGNOSIS — R53.1 GENERALIZED WEAKNESS: ICD-10-CM

## 2018-08-22 DIAGNOSIS — Z98.84 HISTORY OF GASTRIC BYPASS: ICD-10-CM

## 2018-08-22 DIAGNOSIS — E83.51 HYPOCALCEMIA: ICD-10-CM

## 2018-08-22 DIAGNOSIS — G43.009 MIGRAINE WITHOUT AURA AND WITHOUT STATUS MIGRAINOSUS, NOT INTRACTABLE: ICD-10-CM

## 2018-08-22 DIAGNOSIS — R20.2 PARESTHESIA: Primary | ICD-10-CM

## 2018-08-22 PROCEDURE — 99214 OFFICE O/P EST MOD 30 MIN: CPT | Performed by: PHYSICIAN ASSISTANT

## 2018-08-22 PROCEDURE — 1111F DSCHRG MED/CURRENT MED MERGE: CPT | Performed by: PHYSICIAN ASSISTANT

## 2018-08-22 NOTE — PROGRESS NOTES
Patient ID: Rj Amador is a 28 y o  female  Assessment/Plan:    Patient presenting for hospital follow-up today  She was admitted last month for diffuse paresthesias and muscle cramping  She has a history of gastric bypass  She was found to be profoundly hypokalemic as well as had hypocalcemia  Due to the paresthesias and weakness, stroke workup was completed  MRI brain was completely normal  MRA head was normal  It was suggested that she be transferred from [place] to [facility] for further workup and Neurology evaluation  There was some concern for inflammatory neuropathy/myopathy, however no significant weakness on exam and she did have reflexes  LP was completed: RBC 2, WBC 1, Protein 28, Glucose 51, comprehensive PCR negative, gram stain neg  Myelin basic protein slightly elevated at 4 5 and she had zero oligioclonal bands  Her electrolytes were replaced and she was discharged  She did return back to the hospital last week with another recurrent episode of hypokalemia  She has follow up with nephrology and PCP  Discussed with patient that her symptoms are more likely from hypokalemia and hypocalcemia  I would still suggest EMG for completeness  Unfortunately, our office does not accept the patient's insurance  She does have an already established neurologist through [facility] in [place] that she has followed with for migraines  I have encouraged her to call them and set up a follow up  Also, patient concerned about the elevated MBP in the CSF  On her most recent visit to the hospital (neurology not consulted on this visit), she was told by one of the doctors that her CSF was "positive for MS"  The MBP was pending at time of discharge from the July visit  I explained to the patient that her presentation does not fit with MS  Also, her MRI brain and MRI c-spine were completely normal, no abnormal WM signal or cord signal abnormality    Explained that MBP is the least sensitive and specific marker for MS and is still experimental   Oligoclonal bands are the most sensitive and specific CSF test for MS and she had zero bands  Patient voiced understanding and was relieved  She will continue to follow with her already established neurologist   She can call if she has any questions or concerns  Diagnoses and all orders for this visit:    Paresthesia    Generalized weakness    History of gastric bypass    Hypokalemia    Hypocalcemia    Migraine without aura and without status migrainosus, not intractable           Subjective:    HPI    Jannet Moreira is a 28 y o  female who presents today for hospital follow up  [facility] with weakness  She has a prior history significant for gastric bypass surgery  She reports that her symptoms began on or about Memorial Day of 2018  At that time she began to experience sustained muscle cramp of the distal left upper extremity causing sustained clenching of the L hand  The hand would clench in remain usable and tight for 10-15 minutes at a time  This would occur approximately 3 times per day  She was unable to identify any specific triggers  In July 2018, the muscle tension transitioned to a numbness  She clarifies that the numbness is a dysesthesia/paresthesia affecting the entire LUE with otherwise intact sensation  This spread to affect the LLE as well as the LUE and there was accompanying weakness  She also started having similar muscle contractions in her RUE  They are not severely painful, just uncomfortable and tight and do seem to respond to muscle relaxers  They have awakened her from sleep  She was admitted to the hospital on 7/7/18 at which time she was found to be profoundly hypokalemic with normal magnesium  She had a MRI Brain and stroke evaluation but no evidence of ischemic stroke was found  MRA brain normal as well    After repleating her potassium, she was eventually discharged but with no real change in her symptoms  A few days later, she was still experiencing significant generalized weakness and came back for re-admission due to a fall at home on 7/10/18  She has not prior history of similar symptoms before 5/2018  She denies atrophy or fasciculations  Neurology was consulted on 7/11/18 and she had a tele-consult with Dr Frieda Nava  Neurology recommended a lumbar puncture with opening pressures and lab work  The patient was transferred to [facility] for continued up of her symptomatology with an LP as well as to be directly evaluated and followed by Neurology  MRI C spine also negative for acute pathology with exception of solitary L superior juglar chain lymph node enlargement  Of note, the patient had an initial CPK of 968 on 7/7/18, however this has been trending down and was 281 as of 7/12/18  CRP, ESR WNL  Sensory-motor neuropathy panel neg  Myelin-associated glycoprotein neg  LP was completed with OP of 14  CSF protein normal at 28, glucose 51  WBCs 1, RBCs 2   Gram stain neg  MBP elevated at 4 5,  Zero oligoclonal bands  Neurology felt this was less likely CIDP given CSF results and patient had reflexes  Etiology possibly due to her profound hypokalemia  Conversion disorder also considered  After her discharge on 7/15/18, she has been in the ED x 3 and then admitted again once 8/15/18-8/17/18 with a recurrent episode of severe hypokalemia  Today, patient reports she is overall doing ok  She notes she "knows" when her potassium is low because she gets paresthesias  When her numbers are normal, she feels well  She is seeing nephrology, who is following her numbers, just had labs done yesterday and sees them later this week  She is in the process of finding a new PCP  Of note, patient [facility] [place] in the past for migraines  She was given Botox injections, last in Nov 2017    Our office does not accept the patient's insurance unfortunately  The following portions of the patient's history were reviewed and updated as appropriate: current medications, past family history, past medical history, past social history, past surgical history and problem list          Objective:    Blood pressure 108/51, pulse 79, height 5' 3" (1 6 m), weight 68 9 kg (152 lb)  Physical Exam   Constitutional: She appears well-developed and well-nourished  HENT:   Head: Normocephalic and atraumatic  Eyes: EOM are normal  Pupils are equal, round, and reactive to light  Cardiovascular: Intact distal pulses  Neurological: She has normal strength  Gait and coordination normal    Reflex Scores:       Bicep reflexes are 1+ on the right side and 1+ on the left side  Brachioradialis reflexes are 1+ on the right side and 1+ on the left side  Patellar reflexes are 1+ on the right side and 1+ on the left side  Achilles reflexes are 1+ on the right side and 1+ on the left side  Skin: Skin is warm and dry  Psychiatric: She has a normal mood and affect  Her speech is normal        Neurological Exam    Mental Status  The patient is alert and oriented to person, place, time, and situation  Her recent and remote memory are normal  Her speech is normal  Her language is fluent with no aphasia  She has normal attention span and concentration  She has a normal fund of knowledge  Cranial Nerves    CN II: The patient's visual acuity and visual fields are normal   CN III, IV, VI: The patient's pupils are equally round and reactive to light and ocular movements are normal   CN V: The patient has normal facial sensation  CN VII:  The patient has symmetric facial movement  CN VIII:  The patient's hearing is normal   CN IX, X: The patient has symmetric palate movement and normal gag reflex  CN XI: The patient's shoulder shrug strength is normal   CN XII: The patient's tongue is midline without atrophy or fasciculations      Motor  The patient has normal muscle bulk throughout  Her overall muscle tone is normal throughout  Her strength is 5/5 throughout all four extremities  Sensory  The patient's sensation is normal in all four extremities  Reflexes  Deep tendon reflexes are 2+ and symmetric except as noted  Right                     Left  Brachioradialis                      1+                         1+  Biceps                                   1+                         1+  Patellar                                 1+                         1+  Achilles                                1+                         1+    Gait and Coordination  The patient has normal gait and station  She has normal coordination bilaterally  ROS:    Review of Systems   Constitutional: Negative  Negative for appetite change and fever  HENT: Negative  Negative for hearing loss, tinnitus, trouble swallowing and voice change  Eyes: Negative  Negative for photophobia and pain  Respiratory: Negative  Negative for shortness of breath  Cardiovascular: Negative  Negative for palpitations  Gastrointestinal: Negative  Negative for nausea and vomiting  Endocrine: Negative  Negative for cold intolerance and heat intolerance  Genitourinary: Negative  Negative for dysuria, frequency and urgency  Musculoskeletal: Negative  Negative for myalgias and neck pain  Skin: Negative  Negative for rash  Allergic/Immunologic: Negative  Neurological: Positive for dizziness, numbness and headaches  Negative for tremors, seizures, syncope, facial asymmetry, speech difficulty, weakness and light-headedness  Tingling   Hematological: Negative  Does not bruise/bleed easily  Psychiatric/Behavioral: Negative  Negative for confusion, hallucinations and sleep disturbance       I personally reviewed the ROS

## 2018-08-22 NOTE — PATIENT INSTRUCTIONS
Continue to follow with nephrology for electrolyte imbalance   I believe that most of your symptoms can be explained by the hypokalemia and hypocalcemia   As discussed, the finding in your spinal fluid of elevated myelin basic protein does not indicate that you have MS  It is one marker that we use for information in an MS workup, but is still experimental   The other test called oligoclonal banding, which is the most sensitive and specific marker for MS was negative  Also, you MRI brain and cervical spine are completely normal and your presentation does not fit with MS  I would suggest an EMG to assess the peripheral nerves and muscles  You can discuss this with your established neurologist  Call if there are any questions or concerns

## 2018-08-23 LAB
25(OH)D2 SERPL-MCNC: 1.1 NG/ML
25(OH)D3 SERPL-MCNC: 11 NG/ML
25(OH)D3+25(OH)D2 SERPL-MCNC: 12 NG/ML

## 2018-08-28 ENCOUNTER — HOSPITAL ENCOUNTER (EMERGENCY)
Facility: HOSPITAL | Age: 33
Discharge: HOME | End: 2018-08-28
Attending: EMERGENCY MEDICINE | Admitting: EMERGENCY MEDICINE
Payer: COMMERCIAL

## 2018-08-28 VITALS
TEMPERATURE: 98.3 F | WEIGHT: 151.9 LBS | BODY MASS INDEX: 26.91 KG/M2 | SYSTOLIC BLOOD PRESSURE: 115 MMHG | DIASTOLIC BLOOD PRESSURE: 56 MMHG | HEART RATE: 92 BPM | OXYGEN SATURATION: 100 % | RESPIRATION RATE: 18 BRPM | HEIGHT: 63 IN

## 2018-08-28 DIAGNOSIS — R20.2 PARESTHESIAS IN RIGHT HAND: ICD-10-CM

## 2018-08-28 DIAGNOSIS — R20.2 PARESTHESIAS IN LEFT HAND: Primary | ICD-10-CM

## 2018-08-28 DIAGNOSIS — E87.6 HYPOKALEMIA: ICD-10-CM

## 2018-08-28 LAB
ALBUMIN SERPL BCP-MCNC: 3.8 G/DL (ref 3.5–5)
ALP SERPL-CCNC: 50 U/L (ref 46–116)
ALT SERPL W P-5'-P-CCNC: 35 U/L (ref 12–78)
ANION GAP SERPL CALCULATED.3IONS-SCNC: 11 MMOL/L (ref 4–13)
AST SERPL W P-5'-P-CCNC: 24 U/L (ref 5–45)
ATRIAL RATE: 84 BPM
BASOPHILS # BLD AUTO: 0.04 THOUSANDS/ΜL (ref 0–0.1)
BASOPHILS NFR BLD AUTO: 1 % (ref 0–1)
BILIRUB SERPL-MCNC: 0.3 MG/DL (ref 0.2–1)
BUN SERPL-MCNC: 15 MG/DL (ref 5–25)
CALCIUM SERPL-MCNC: 8.6 MG/DL (ref 8.3–10.1)
CHLORIDE SERPL-SCNC: 105 MMOL/L (ref 100–108)
CO2 SERPL-SCNC: 25 MMOL/L (ref 21–32)
CREAT SERPL-MCNC: 1.1 MG/DL (ref 0.6–1.3)
EOSINOPHIL # BLD AUTO: 0.1 THOUSAND/ΜL (ref 0–0.61)
EOSINOPHIL NFR BLD AUTO: 2 % (ref 0–6)
ERYTHROCYTE [DISTWIDTH] IN BLOOD BY AUTOMATED COUNT: 14.5 % (ref 11.6–15.1)
GFR SERPL CREATININE-BSD FRML MDRD: 66 ML/MIN/1.73SQ M
GLUCOSE SERPL-MCNC: 83 MG/DL (ref 65–140)
HCT VFR BLD AUTO: 37.3 % (ref 34.8–46.1)
HGB BLD-MCNC: 12.4 G/DL (ref 11.5–15.4)
IMM GRANULOCYTES # BLD AUTO: 0.01 THOUSAND/UL (ref 0–0.2)
IMM GRANULOCYTES NFR BLD AUTO: 0 % (ref 0–2)
LYMPHOCYTES # BLD AUTO: 2.42 THOUSANDS/ΜL (ref 0.6–4.47)
LYMPHOCYTES NFR BLD AUTO: 36 % (ref 14–44)
MAGNESIUM SERPL-MCNC: 2.2 MG/DL (ref 1.6–2.6)
MCH RBC QN AUTO: 30.2 PG (ref 26.8–34.3)
MCHC RBC AUTO-ENTMCNC: 33.2 G/DL (ref 31.4–37.4)
MCV RBC AUTO: 91 FL (ref 82–98)
MONOCYTES # BLD AUTO: 0.45 THOUSAND/ΜL (ref 0.17–1.22)
MONOCYTES NFR BLD AUTO: 7 % (ref 4–12)
NEUTROPHILS # BLD AUTO: 3.8 THOUSANDS/ΜL (ref 1.85–7.62)
NEUTS SEG NFR BLD AUTO: 54 % (ref 43–75)
NRBC BLD AUTO-RTO: 0 /100 WBCS
P AXIS: 64 DEGREES
PLATELET # BLD AUTO: 314 THOUSANDS/UL (ref 149–390)
PMV BLD AUTO: 10.2 FL (ref 8.9–12.7)
POTASSIUM SERPL-SCNC: 3 MMOL/L (ref 3.5–5.3)
PR INTERVAL: 120 MS
PROT SERPL-MCNC: 7.7 G/DL (ref 6.4–8.2)
QRS AXIS: 20 DEGREES
QRSD INTERVAL: 94 MS
QT INTERVAL: 366 MS
QTC INTERVAL: 432 MS
RBC # BLD AUTO: 4.11 MILLION/UL (ref 3.81–5.12)
SODIUM SERPL-SCNC: 141 MMOL/L (ref 136–145)
T WAVE AXIS: 62 DEGREES
VENTRICULAR RATE: 84 BPM
WBC # BLD AUTO: 6.82 THOUSAND/UL (ref 4.31–10.16)

## 2018-08-28 PROCEDURE — 93005 ELECTROCARDIOGRAM TRACING: CPT

## 2018-08-28 PROCEDURE — 93010 ELECTROCARDIOGRAM REPORT: CPT | Performed by: INTERNAL MEDICINE

## 2018-08-28 PROCEDURE — 36415 COLL VENOUS BLD VENIPUNCTURE: CPT | Performed by: PHYSICIAN ASSISTANT

## 2018-08-28 PROCEDURE — 99284 EMERGENCY DEPT VISIT MOD MDM: CPT

## 2018-08-28 PROCEDURE — 85025 COMPLETE CBC W/AUTO DIFF WBC: CPT | Performed by: PHYSICIAN ASSISTANT

## 2018-08-28 PROCEDURE — 80053 COMPREHEN METABOLIC PANEL: CPT | Performed by: PHYSICIAN ASSISTANT

## 2018-08-28 PROCEDURE — 83735 ASSAY OF MAGNESIUM: CPT | Performed by: PHYSICIAN ASSISTANT

## 2018-08-28 RX ORDER — POTASSIUM CHLORIDE 20MEQ/15ML
40 LIQUID (ML) ORAL ONCE
Status: COMPLETED | OUTPATIENT
Start: 2018-08-28 | End: 2018-08-28

## 2018-08-28 RX ADMIN — POTASSIUM CHLORIDE 40 MEQ: 20 SOLUTION ORAL at 13:33

## 2018-08-28 NOTE — ED PROVIDER NOTES
History  Chief Complaint   Patient presents with    Numbness     Numbness and tingling to bilateral hands and feet, and face since Thursday     33 yr female with bilateral hand/fingers paresthesias for 2+ months waxing and waning, hospitalized twice for hypokalemia/hypocalcemia, full negative neurologic workup by 2 neurologists including MRI/MRA and LP  Sx worsening over the past week  Feels like having to shake hands awake  Not painful but annoying  Overall sensation is preserved though sometimes cannot feel thin or light things such as a piece of paper between fingertips  No further cramping or spasms of the hands or feet  Her feet are not tingling at all today  She does intermittently get same tingling in left upper lip  Denies fasiculations or twitching  No vision change  Feels frustrated  Has chronic migraines no different from prior  No significant headache today  Last K-check was 3 5 over a week ago, 3 8 on day of discharge 4 days prior  Did take a few days for liquid potassium to get approved by insurance she just started it 20ml tid on Friday 8/24/18  History provided by:  Patient and medical records      Prior to Admission Medications   Prescriptions Last Dose Informant Patient Reported? Taking?    Multiple Vitamins-Minerals (MULTIVITAMIN WITH MINERALS) tablet   No Yes   Sig: Take 1 tablet by mouth daily   amitriptyline (ELAVIL) 100 mg tablet   Yes Yes   Sig: Take 25 mg by mouth daily at bedtime   butalbital-acetaminophen-caffeine (FIORICET,ESGIC) -40 mg per tablet   No No   Sig: Take 1 tablet by mouth every 4 (four) hours as needed for headaches   Patient not taking: Reported on 8/22/2018    calcium carbonate-vitamin D (OSCAL-D) 500 mg-200 units per tablet   No Yes   Sig: Take 1 tablet by mouth 2 (two) times a day with meals   cholecalciferol (VITAMIN D3) 1,000 units tablet   No Yes   Sig: Take 1 tablet (1,000 Units total) by mouth daily   cyanocobalamin 500 MCG tablet   No Yes Sig: Take 1 tablet (500 mcg total) by mouth daily   meclizine (ANTIVERT) 25 mg tablet   No Yes   Sig: Take 1 tablet (25 mg total) by mouth every 12 (twelve) hours as needed for dizziness   potassium chloride 10 %  Self No Yes   Sig: Take 30 mL (40 mEq total) by mouth 2 (two) times a day   Patient taking differently: Take 40 mEq by mouth 3 (three) times a day     potassium-sodium phosphateS (K-PHOS,PHOSPHA 250) 155-852-130 mg tablet   No Yes   Sig: Take 1 tablet by mouth 4 (four) times a day (with meals and at bedtime)   topiramate (TOPAMAX) 50 MG tablet  Self Yes Yes   Sig: Take 25 mg by mouth daily        Facility-Administered Medications: None       Past Medical History:   Diagnosis Date    Hypokalemia     Migraine        Past Surgical History:   Procedure Laterality Date    ABDOMINAL SURGERY      APPENDECTOMY      CHOLECYSTECTOMY      GASTRIC BYPASS      HYSTERECTOMY         Family History   Problem Relation Age of Onset    Hypertension Father     Diabetes Father     Diabetes Maternal Grandfather      I have reviewed and agree with the history as documented  Social History   Substance Use Topics    Smoking status: Never Smoker    Smokeless tobacco: Never Used    Alcohol use Yes      Comment: socially        Review of Systems   Constitutional: Negative for activity change, appetite change, chills, fatigue and fever  HENT: Negative for congestion and sore throat  Respiratory: Negative for cough and shortness of breath  Cardiovascular: Negative for chest pain and leg swelling  Gastrointestinal: Negative for abdominal pain, diarrhea and vomiting  Genitourinary: Negative for decreased urine volume and difficulty urinating  Musculoskeletal: Negative for back pain, gait problem, myalgias and neck pain  Skin: Negative for rash and wound  Neurological: Positive for numbness (paresthesias)   Negative for dizziness, tremors, seizures, syncope, facial asymmetry, speech difficulty, weakness, light-headedness and headaches  Physical Exam  Physical Exam   Constitutional: She is oriented to person, place, and time  She appears well-developed and well-nourished  No distress  HENT:   Head: Normocephalic and atraumatic  Nose: Nose normal    Mouth/Throat: Oropharynx is clear and moist    Eyes: Conjunctivae and EOM are normal  Pupils are equal, round, and reactive to light  No nystagmus   Cardiovascular: Normal rate and regular rhythm  No murmur heard  Pulmonary/Chest: Effort normal and breath sounds normal    Musculoskeletal: She exhibits no edema, tenderness or deformity  Neurological: She is alert and oriented to person, place, and time  She displays normal reflexes  No cranial nerve deficit or sensory deficit  She exhibits normal muscle tone  Coordination normal    Skin: Skin is warm and dry  Capillary refill takes less than 2 seconds  No rash noted  She is not diaphoretic  No erythema  No pallor  Nursing note and vitals reviewed        Vital Signs  ED Triage Vitals [08/28/18 1203]   Temperature Pulse Respirations Blood Pressure SpO2   98 3 °F (36 8 °C) 102 18 132/62 99 %      Temp Source Heart Rate Source Patient Position - Orthostatic VS BP Location FiO2 (%)   Temporal Monitor Sitting Right arm --      Pain Score       No Pain           Vitals:    08/28/18 1235 08/28/18 1300 08/28/18 1330 08/28/18 1400   BP: 138/61 114/60 113/75 115/56   Pulse: 86 86 86 92   Patient Position - Orthostatic VS: Lying Lying Lying Lying       Visual Acuity  Visual Acuity      Most Recent Value   L Pupil Size (mm)  3   R Pupil Size (mm)  3          ED Medications  Medications   potassium chloride 10 % oral solution 40 mEq (40 mEq Oral Given 8/28/18 1333)       Diagnostic Studies  Results Reviewed     Procedure Component Value Units Date/Time    Comprehensive metabolic panel [[id number]]  (Abnormal) Collected:  08/28/18 1230    Lab Status:  Final result Specimen:  Blood from Arm, Left Updated:  08/28/18 1257 Sodium 141 mmol/L      Potassium 3 0 (L) mmol/L      Chloride 105 mmol/L      CO2 25 mmol/L      ANION GAP 11 mmol/L      BUN 15 mg/dL      Creatinine 1 10 mg/dL      Glucose 83 mg/dL      Calcium 8 6 mg/dL      AST 24 U/L      ALT 35 U/L      Alkaline Phosphatase 50 U/L      Total Protein 7 7 g/dL      Albumin 3 8 g/dL      Total Bilirubin 0 30 mg/dL      eGFR 66 ml/min/1 73sq m     Narrative:         National Kidney Disease Education Program recommendations are as follows:  GFR calculation is accurate only with a steady state creatinine  Chronic Kidney disease less than 60 ml/min/1 73 sq  meters  Kidney failure less than 15 ml/min/1 73 sq  meters      Magnesium [[id number]]  (Normal) Collected:  08/28/18 1230    Lab Status:  Final result Specimen:  Blood from Arm, Left Updated:  08/28/18 1257     Magnesium 2 2 mg/dL     CBC and differential [[id number]] Collected:  08/28/18 1230    Lab Status:  Final result Specimen:  Blood from Arm, Left Updated:  08/28/18 1244     WBC 6 82 Thousand/uL      RBC 4 11 Million/uL      Hemoglobin 12 4 g/dL      Hematocrit 37 3 %      MCV 91 fL      MCH 30 2 pg      MCHC 33 2 g/dL      RDW 14 5 %      MPV 10 2 fL      Platelets 280 Thousands/uL      nRBC 0 /100 WBCs      Neutrophils Relative 54 %      Immat GRANS % 0 %      Lymphocytes Relative 36 %      Monocytes Relative 7 %      Eosinophils Relative 2 %      Basophils Relative 1 %      Neutrophils Absolute 3 80 Thousands/µL      Immature Grans Absolute 0 01 Thousand/uL      Lymphocytes Absolute 2 42 Thousands/µL      Monocytes Absolute 0 45 Thousand/µL      Eosinophils Absolute 0 10 Thousand/µL      Basophils Absolute 0 04 Thousands/µL                  No orders to display              Procedures  ECG 12 Lead Documentation  Date/Time: 8/28/2018 12:33 PM  Performed by: Jodie Gu  Authorized by: Jodie Gu     Indications / Diagnosis:  Hypokalemia  ECG reviewed by me, the ED Provider: yes    Patient location:  ED  Rate: ECG rate:  84  Rhythm:     Rhythm: sinus rhythm    Ectopy:     Ectopy: none    QRS:     QRS axis:  Normal  Conduction:     Conduction: normal    ST segments:     ST segments:  Normal  T waves:     T waves: normal             Phone Contacts  ED Phone Contact    ED Course  ED Course as of Aug 28 1836   Tue Aug 28, 2018   1254 WBC: 6 82   1254 Hemoglobin: 12 4   1254 HCT: 37 3   1254 Platelets: 281   1566 RDW: 14 5   1318 Sodium: 141   1318 Potassium: (!) 3 0   1318 Chloride: 105   1318 CO2: 25   1318 Anion Gap: 11   1318 BUN: 15   1318 Creatinine: 1 10   1318 Calcium: 8 6   1318 eGFR: 66   1318 Magnesium: 2 2       MDM  Number of Diagnoses or Management Options  Hypokalemia: established and worsening  Paresthesias in left hand: established and worsening  Paresthesias in right hand: established and worsening     Amount and/or Complexity of Data Reviewed  Clinical lab tests: ordered and reviewed  Review and summarize past medical records: yes  Discuss the patient with other providers: yes  Independent visualization of images, tracings, or specimens: yes    Risk of Complications, Morbidity, and/or Mortality  Presenting problems: moderate  Diagnostic procedures: low  Management options: low    Patient Progress  Patient progress: stable    CritCare Time    Disposition  Final diagnoses:   Paresthesias in left hand   Paresthesias in right hand   Hypokalemia     Time reflects when diagnosis was documented in both MDM as applicable and the Disposition within this note     Time User Action Codes Description Comment    8/28/2018  1:50 PM Scherrie Earnest Add [R20 2] Paresthesias in left hand     8/28/2018  1:50 PM Scherrie Earnest Add [R20 2] Paresthesias in right hand     8/28/2018  1:51 PM Scherrie Earnest Add [E87 6] Hypokalemia       ED Disposition     ED Disposition Condition Comment    Discharge  Isadore Claude Deuber discharge to home/self care      Condition at discharge: Good        Follow-up Information     Follow up With Specialties Details Why Contact Info    Thong Vela MD Family Medicine Schedule an appointment as soon as possible for a visit  [place] [address] [address] [place] [address]  [phone]      Dr Shiva العلي to as scheduled for nephrology followup           Discharge Medication List as of 8/28/2018  2:03 PM      CONTINUE these medications which have NOT CHANGED    Details   amitriptyline (ELAVIL) 100 mg tablet Take 25 mg by mouth daily at bedtime, Historical Med      calcium carbonate-vitamin D (OSCAL-D) 500 mg-200 units per tablet Take 1 tablet by mouth 2 (two) times a day with meals, Starting Tue 7/10/2018, Normal      cholecalciferol (VITAMIN D3) 1,000 units tablet Take 1 tablet (1,000 Units total) by mouth daily, Starting Wed 7/11/2018, Normal      cyanocobalamin 500 MCG tablet Take 1 tablet (500 mcg total) by mouth daily, Starting Wed 7/11/2018, Normal      meclizine (ANTIVERT) 25 mg tablet Take 1 tablet (25 mg total) by mouth every 12 (twelve) hours as needed for dizziness, Starting Tue 7/10/2018, No Print      Multiple Vitamins-Minerals (MULTIVITAMIN WITH MINERALS) tablet Take 1 tablet by mouth daily, Starting Sun 7/15/2018, Print      potassium chloride 10 % Take 30 mL (40 mEq total) by mouth 2 (two) times a day, Starting Fri 8/17/2018, Normal      potassium-sodium phosphateS (K-PHOS,PHOSPHA 250) 155-852-130 mg tablet Take 1 tablet by mouth 4 (four) times a day (with meals and at bedtime), Starting Fri 8/17/2018, Normal      topiramate (TOPAMAX) 50 MG tablet Take 25 mg by mouth daily  , Historical Med      butalbital-acetaminophen-caffeine (FIORICET,ESGIC) -40 mg per tablet Take 1 tablet by mouth every 4 (four) hours as needed for headaches, Starting Sun 7/15/2018, Print           No discharge procedures on file      ED Provider  Electronically Signed by           Joy Collier PA-C  08/28/18 9355

## 2018-08-28 NOTE — DISCHARGE INSTRUCTIONS
Hypokalemia   WHAT YOU NEED TO KNOW:   Hypokalemia is a low level of potassium in your blood  Potassium helps control how your muscles, heart, and digestive system work  Hypokalemia occurs when your body loses too much potassium or does not absorb enough from food  DISCHARGE INSTRUCTIONS:   Return to the emergency department if:   · You cannot move your arm or leg  · You have a fast or irregular heartbeat  · You are too tired or weak to stand up  Contact your healthcare provider if:   · You are vomiting, or you have diarrhea  · You have numbness or tingling in your arms or legs  · Your symptoms do not go away or they get worse  · You have questions or concerns about your condition or care  Medicines:   · Potassium  will be given to bring your potassium levels back to normal     · Take your medicine as directed  Contact your healthcare provider if you think your medicine is not helping or if you have side effects  Tell him of her if you are allergic to any medicine  Keep a list of the medicines, vitamins, and herbs you take  Include the amounts, and when and why you take them  Bring the list or the pill bottles to follow-up visits  Carry your medicine list with you in case of an emergency  Eat foods that are high in potassium:  Foods that are high in potassium include bananas, oranges, tomatoes, potatoes, and avocado  Kang beans, turkey, salmon, lean beef, yogurt, and milk are also high in potassium  Ask your healthcare provider or dietitian for more information about foods that are high in potassium  Follow up with your healthcare provider as directed:  Write down your questions so you remember to ask them during your visits  © 2017 [employer]  Information is for End User's use only and may not be sold, redistributed or otherwise used for commercial purposes   All illustrations and images included in CareNotes® are the copyrighted property of [employer]  or [employer]  The above information is an  only  It is not intended as medical advice for individual conditions or treatments  Talk to your doctor, nurse or pharmacist before following any medical regimen to see if it is safe and effective for you

## 2018-09-21 ENCOUNTER — HOSPITAL ENCOUNTER (INPATIENT)
Facility: HOSPITAL | Age: 33
LOS: 3 days | Discharge: HOME | DRG: 425 | End: 2018-09-24
Attending: EMERGENCY MEDICINE | Admitting: FAMILY MEDICINE
Payer: COMMERCIAL

## 2018-09-21 DIAGNOSIS — R53.1 GENERALIZED WEAKNESS: ICD-10-CM

## 2018-09-21 DIAGNOSIS — I95.9 HYPOTENSION: ICD-10-CM

## 2018-09-21 DIAGNOSIS — R20.2 PARESTHESIA: Primary | ICD-10-CM

## 2018-09-21 DIAGNOSIS — E87.6 HYPOKALEMIA: ICD-10-CM

## 2018-09-21 LAB
ALBUMIN SERPL BCP-MCNC: 3.9 G/DL (ref 3.5–5)
ALP SERPL-CCNC: 52 U/L (ref 46–116)
ALT SERPL W P-5'-P-CCNC: 22 U/L (ref 12–78)
ANION GAP SERPL CALCULATED.3IONS-SCNC: 10 MMOL/L (ref 4–13)
ANION GAP SERPL CALCULATED.3IONS-SCNC: 11 MMOL/L (ref 4–13)
AST SERPL W P-5'-P-CCNC: 20 U/L (ref 5–45)
BASOPHILS # BLD AUTO: 0.06 THOUSANDS/ΜL (ref 0–0.1)
BASOPHILS NFR BLD AUTO: 1 % (ref 0–1)
BILIRUB SERPL-MCNC: 0.3 MG/DL (ref 0.2–1)
BUN SERPL-MCNC: 13 MG/DL (ref 5–25)
BUN SERPL-MCNC: 15 MG/DL (ref 5–25)
CALCIUM SERPL-MCNC: 8.1 MG/DL (ref 8.3–10.1)
CALCIUM SERPL-MCNC: 8.9 MG/DL (ref 8.3–10.1)
CHLORIDE SERPL-SCNC: 100 MMOL/L (ref 100–108)
CHLORIDE SERPL-SCNC: 102 MMOL/L (ref 100–108)
CO2 SERPL-SCNC: 25 MMOL/L (ref 21–32)
CO2 SERPL-SCNC: 25 MMOL/L (ref 21–32)
CREAT SERPL-MCNC: 0.93 MG/DL (ref 0.6–1.3)
CREAT SERPL-MCNC: 1.02 MG/DL (ref 0.6–1.3)
EOSINOPHIL # BLD AUTO: 0.13 THOUSAND/ΜL (ref 0–0.61)
EOSINOPHIL NFR BLD AUTO: 2 % (ref 0–6)
ERYTHROCYTE [DISTWIDTH] IN BLOOD BY AUTOMATED COUNT: 13.6 % (ref 11.6–15.1)
GFR SERPL CREATININE-BSD FRML MDRD: 72 ML/MIN/1.73SQ M
GFR SERPL CREATININE-BSD FRML MDRD: 80 ML/MIN/1.73SQ M
GLUCOSE SERPL-MCNC: 78 MG/DL (ref 65–140)
GLUCOSE SERPL-MCNC: 80 MG/DL (ref 65–140)
HCT VFR BLD AUTO: 38.4 % (ref 34.8–46.1)
HGB BLD-MCNC: 13.4 G/DL (ref 11.5–15.4)
IMM GRANULOCYTES # BLD AUTO: 0.01 THOUSAND/UL (ref 0–0.2)
IMM GRANULOCYTES NFR BLD AUTO: 0 % (ref 0–2)
LYMPHOCYTES # BLD AUTO: 3.05 THOUSANDS/ΜL (ref 0.6–4.47)
LYMPHOCYTES NFR BLD AUTO: 42 % (ref 14–44)
MAGNESIUM SERPL-MCNC: 2.4 MG/DL (ref 1.6–2.6)
MCH RBC QN AUTO: 29.9 PG (ref 26.8–34.3)
MCHC RBC AUTO-ENTMCNC: 34.9 G/DL (ref 31.4–37.4)
MCV RBC AUTO: 86 FL (ref 82–98)
MONOCYTES # BLD AUTO: 0.62 THOUSAND/ΜL (ref 0.17–1.22)
MONOCYTES NFR BLD AUTO: 9 % (ref 4–12)
NEUTROPHILS # BLD AUTO: 3.32 THOUSANDS/ΜL (ref 1.85–7.62)
NEUTS SEG NFR BLD AUTO: 46 % (ref 43–75)
NRBC BLD AUTO-RTO: 0 /100 WBCS
PHOSPHATE SERPL-MCNC: 4 MG/DL (ref 2.7–4.5)
PLATELET # BLD AUTO: 301 THOUSANDS/UL (ref 149–390)
PMV BLD AUTO: 10.4 FL (ref 8.9–12.7)
POTASSIUM SERPL-SCNC: 2 MMOL/L (ref 3.5–5.3)
POTASSIUM SERPL-SCNC: 2.3 MMOL/L (ref 3.5–5.3)
PROT SERPL-MCNC: 7.7 G/DL (ref 6.4–8.2)
RBC # BLD AUTO: 4.48 MILLION/UL (ref 3.81–5.12)
SODIUM SERPL-SCNC: 135 MMOL/L (ref 136–145)
SODIUM SERPL-SCNC: 138 MMOL/L (ref 136–145)
WBC # BLD AUTO: 7.19 THOUSAND/UL (ref 4.31–10.16)

## 2018-09-21 PROCEDURE — 36415 COLL VENOUS BLD VENIPUNCTURE: CPT | Performed by: EMERGENCY MEDICINE

## 2018-09-21 PROCEDURE — 80048 BASIC METABOLIC PNL TOTAL CA: CPT | Performed by: PHYSICIAN ASSISTANT

## 2018-09-21 PROCEDURE — 80053 COMPREHEN METABOLIC PANEL: CPT | Performed by: EMERGENCY MEDICINE

## 2018-09-21 PROCEDURE — 85025 COMPLETE CBC W/AUTO DIFF WBC: CPT | Performed by: EMERGENCY MEDICINE

## 2018-09-21 PROCEDURE — 84100 ASSAY OF PHOSPHORUS: CPT | Performed by: EMERGENCY MEDICINE

## 2018-09-21 PROCEDURE — 84133 ASSAY OF URINE POTASSIUM: CPT | Performed by: PHYSICIAN ASSISTANT

## 2018-09-21 PROCEDURE — 99284 EMERGENCY DEPT VISIT MOD MDM: CPT

## 2018-09-21 PROCEDURE — 83735 ASSAY OF MAGNESIUM: CPT | Performed by: EMERGENCY MEDICINE

## 2018-09-21 PROCEDURE — 99222 1ST HOSP IP/OBS MODERATE 55: CPT | Performed by: PHYSICIAN ASSISTANT

## 2018-09-21 PROCEDURE — 93005 ELECTROCARDIOGRAM TRACING: CPT

## 2018-09-21 RX ORDER — POTASSIUM CHLORIDE 20MEQ/15ML
40 LIQUID (ML) ORAL 3 TIMES DAILY
Status: DISCONTINUED | OUTPATIENT
Start: 2018-09-21 | End: 2018-09-24 | Stop reason: HOSPADM

## 2018-09-21 RX ORDER — ONDANSETRON 2 MG/ML
4 INJECTION INTRAMUSCULAR; INTRAVENOUS EVERY 6 HOURS PRN
Status: DISCONTINUED | OUTPATIENT
Start: 2018-09-21 | End: 2018-09-24 | Stop reason: HOSPADM

## 2018-09-21 RX ORDER — POTASSIUM CHLORIDE 14.9 MG/ML
20 INJECTION INTRAVENOUS ONCE
Status: COMPLETED | OUTPATIENT
Start: 2018-09-21 | End: 2018-09-21

## 2018-09-21 RX ORDER — B-COMPLEX WITH VITAMIN C
1 TABLET ORAL 2 TIMES DAILY WITH MEALS
Status: DISCONTINUED | OUTPATIENT
Start: 2018-09-22 | End: 2018-09-24 | Stop reason: HOSPADM

## 2018-09-21 RX ORDER — AMITRIPTYLINE HYDROCHLORIDE 50 MG/1
50 TABLET, FILM COATED ORAL
Status: DISCONTINUED | OUTPATIENT
Start: 2018-09-21 | End: 2018-09-24 | Stop reason: HOSPADM

## 2018-09-21 RX ORDER — ACETAMINOPHEN 325 MG/1
650 TABLET ORAL EVERY 6 HOURS PRN
Status: DISCONTINUED | OUTPATIENT
Start: 2018-09-21 | End: 2018-09-24 | Stop reason: HOSPADM

## 2018-09-21 RX ORDER — POTASSIUM CHLORIDE 20MEQ/15ML
40 LIQUID (ML) ORAL ONCE
Status: COMPLETED | OUTPATIENT
Start: 2018-09-21 | End: 2018-09-21

## 2018-09-21 RX ORDER — SODIUM CHLORIDE AND POTASSIUM CHLORIDE .9; .15 G/100ML; G/100ML
125 SOLUTION INTRAVENOUS CONTINUOUS
Status: DISCONTINUED | OUTPATIENT
Start: 2018-09-21 | End: 2018-09-22

## 2018-09-21 RX ORDER — AMITRIPTYLINE HYDROCHLORIDE 50 MG/1
50 TABLET, FILM COATED ORAL
COMMUNITY
Start: 2018-08-27 | End: 2021-06-19 | Stop reason: HOSPADM

## 2018-09-21 RX ORDER — MELATONIN
1000 DAILY
Status: DISCONTINUED | OUTPATIENT
Start: 2018-09-22 | End: 2018-09-24 | Stop reason: HOSPADM

## 2018-09-21 RX ORDER — MECLIZINE HYDROCHLORIDE 25 MG/1
25 TABLET ORAL
Status: ON HOLD | COMMUNITY
Start: 2018-08-27 | End: 2020-07-27 | Stop reason: CLARIF

## 2018-09-21 RX ORDER — CHOLECALCIFEROL (VITAMIN D3) 125 MCG
500 CAPSULE ORAL DAILY
Status: DISCONTINUED | OUTPATIENT
Start: 2018-09-22 | End: 2018-09-24 | Stop reason: HOSPADM

## 2018-09-21 RX ORDER — POTASSIUM CHLORIDE 20MEQ/15ML
80 LIQUID (ML) ORAL 2 TIMES DAILY
COMMUNITY
Start: 2018-09-03 | End: 2018-10-16 | Stop reason: HOSPADM

## 2018-09-21 RX ORDER — TOPIRAMATE 25 MG/1
25 TABLET ORAL
Status: DISCONTINUED | OUTPATIENT
Start: 2018-09-21 | End: 2018-09-24 | Stop reason: HOSPADM

## 2018-09-21 RX ORDER — TOPIRAMATE 25 MG/1
25 TABLET ORAL DAILY
Status: DISCONTINUED | OUTPATIENT
Start: 2018-09-22 | End: 2018-09-21

## 2018-09-21 RX ORDER — BUTALBITAL, ACETAMINOPHEN AND CAFFEINE 50; 325; 40 MG/1; MG/1; MG/1
1 TABLET ORAL EVERY 4 HOURS PRN
Status: DISCONTINUED | OUTPATIENT
Start: 2018-09-21 | End: 2018-09-24 | Stop reason: HOSPADM

## 2018-09-21 RX ORDER — MECLIZINE HYDROCHLORIDE 25 MG/1
25 TABLET ORAL
Status: DISCONTINUED | OUTPATIENT
Start: 2018-09-21 | End: 2018-09-24 | Stop reason: HOSPADM

## 2018-09-21 RX ADMIN — POTASSIUM CHLORIDE 40 MEQ: 20 SOLUTION ORAL at 18:52

## 2018-09-21 RX ADMIN — AMITRIPTYLINE HYDROCHLORIDE 50 MG: 50 TABLET, FILM COATED ORAL at 21:50

## 2018-09-21 RX ADMIN — MECLIZINE HYDROCHLORIDE 25 MG: 25 TABLET ORAL at 21:51

## 2018-09-21 RX ADMIN — TOPIRAMATE 25 MG: 25 TABLET, FILM COATED ORAL at 21:51

## 2018-09-21 RX ADMIN — POTASSIUM CHLORIDE 20 MEQ: 200 INJECTION, SOLUTION INTRAVENOUS at 18:53

## 2018-09-21 RX ADMIN — POTASSIUM CHLORIDE 40 MEQ: 20 SOLUTION ORAL at 20:45

## 2018-09-21 RX ADMIN — SODIUM CHLORIDE AND POTASSIUM CHLORIDE 125 ML/HR: .9; .15 SOLUTION INTRAVENOUS at 20:47

## 2018-09-21 RX ADMIN — DIBASIC SODIUM PHOSPHATE, MONOBASIC POTASSIUM PHOSPHATE AND MONOBASIC SODIUM PHOSPHATE 1 TABLET: 852; 155; 130 TABLET ORAL at 21:50

## 2018-09-22 LAB
ALBUMIN SERPL BCP-MCNC: 3.2 G/DL (ref 3.5–5)
ALP SERPL-CCNC: 46 U/L (ref 46–116)
ALT SERPL W P-5'-P-CCNC: 22 U/L (ref 12–78)
ANION GAP SERPL CALCULATED.3IONS-SCNC: 11 MMOL/L (ref 4–13)
ANION GAP SERPL CALCULATED.3IONS-SCNC: 12 MMOL/L (ref 4–13)
ANION GAP SERPL CALCULATED.3IONS-SCNC: 12 MMOL/L (ref 4–13)
ANION GAP SERPL CALCULATED.3IONS-SCNC: 13 MMOL/L (ref 4–13)
ANION GAP SERPL CALCULATED.3IONS-SCNC: 13 MMOL/L (ref 4–13)
APTT PPP: 29 SECONDS (ref 24–36)
ARTERIAL PATENCY WRIST A: YES
AST SERPL W P-5'-P-CCNC: 19 U/L (ref 5–45)
BASE EXCESS BLDA CALC-SCNC: -4.4 MMOL/L
BILIRUB SERPL-MCNC: 0.2 MG/DL (ref 0.2–1)
BUN SERPL-MCNC: 12 MG/DL (ref 5–25)
BUN SERPL-MCNC: 13 MG/DL (ref 5–25)
BUN SERPL-MCNC: 13 MG/DL (ref 5–25)
BUN SERPL-MCNC: 14 MG/DL (ref 5–25)
BUN SERPL-MCNC: 14 MG/DL (ref 5–25)
CA-I BLD-SCNC: 1.13 MMOL/L (ref 1.12–1.32)
CALCIUM SERPL-MCNC: 7.9 MG/DL (ref 8.3–10.1)
CALCIUM SERPL-MCNC: 7.9 MG/DL (ref 8.3–10.1)
CALCIUM SERPL-MCNC: 8 MG/DL (ref 8.3–10.1)
CALCIUM SERPL-MCNC: 8 MG/DL (ref 8.3–10.1)
CALCIUM SERPL-MCNC: 8.4 MG/DL (ref 8.3–10.1)
CHLORIDE SERPL-SCNC: 106 MMOL/L (ref 100–108)
CHLORIDE SERPL-SCNC: 108 MMOL/L (ref 100–108)
CHLORIDE SERPL-SCNC: 109 MMOL/L (ref 100–108)
CO2 SERPL-SCNC: 19 MMOL/L (ref 21–32)
CO2 SERPL-SCNC: 20 MMOL/L (ref 21–32)
CO2 SERPL-SCNC: 20 MMOL/L (ref 21–32)
CO2 SERPL-SCNC: 21 MMOL/L (ref 21–32)
CO2 SERPL-SCNC: 22 MMOL/L (ref 21–32)
CREAT SERPL-MCNC: 0.88 MG/DL (ref 0.6–1.3)
CREAT SERPL-MCNC: 0.88 MG/DL (ref 0.6–1.3)
CREAT SERPL-MCNC: 0.95 MG/DL (ref 0.6–1.3)
CREAT SERPL-MCNC: 0.95 MG/DL (ref 0.6–1.3)
CREAT SERPL-MCNC: 1.03 MG/DL (ref 0.6–1.3)
ERYTHROCYTE [DISTWIDTH] IN BLOOD BY AUTOMATED COUNT: 13.9 % (ref 11.6–15.1)
GFR SERPL CREATININE-BSD FRML MDRD: 71 ML/MIN/1.73SQ M
GFR SERPL CREATININE-BSD FRML MDRD: 78 ML/MIN/1.73SQ M
GFR SERPL CREATININE-BSD FRML MDRD: 78 ML/MIN/1.73SQ M
GFR SERPL CREATININE-BSD FRML MDRD: 86 ML/MIN/1.73SQ M
GFR SERPL CREATININE-BSD FRML MDRD: 86 ML/MIN/1.73SQ M
GLUCOSE SERPL-MCNC: 106 MG/DL (ref 65–140)
GLUCOSE SERPL-MCNC: 81 MG/DL (ref 65–140)
GLUCOSE SERPL-MCNC: 86 MG/DL (ref 65–140)
GLUCOSE SERPL-MCNC: 86 MG/DL (ref 65–140)
GLUCOSE SERPL-MCNC: 97 MG/DL (ref 65–140)
HCO3 BLDA-SCNC: 18.1 MMOL/L (ref 22–28)
HCT VFR BLD AUTO: 33.8 % (ref 34.8–46.1)
HGB BLD-MCNC: 11.8 G/DL (ref 11.5–15.4)
INR PPP: 1.04 (ref 0.86–1.17)
MAGNESIUM SERPL-MCNC: 2.3 MG/DL (ref 1.6–2.6)
MCH RBC QN AUTO: 30.3 PG (ref 26.8–34.3)
MCHC RBC AUTO-ENTMCNC: 34.9 G/DL (ref 31.4–37.4)
MCV RBC AUTO: 87 FL (ref 82–98)
NON VENT ROOM AIR: 21 %
O2 CT BLDA-SCNC: 16 ML/DL (ref 16–23)
OXYHGB MFR BLDA: 97.6 % (ref 94–97)
PCO2 BLDA: 26.1 MM HG (ref 36–44)
PH BLDA: 7.46 [PH] (ref 7.35–7.45)
PHOSPHATE SERPL-MCNC: 4 MG/DL (ref 2.7–4.5)
PLATELET # BLD AUTO: 280 THOUSANDS/UL (ref 149–390)
PMV BLD AUTO: 10.6 FL (ref 8.9–12.7)
PO2 BLDA: 115.1 MM HG (ref 75–129)
POTASSIUM SERPL-SCNC: 2.5 MMOL/L (ref 3.5–5.3)
POTASSIUM SERPL-SCNC: 2.6 MMOL/L (ref 3.5–5.3)
POTASSIUM SERPL-SCNC: 2.7 MMOL/L (ref 3.5–5.3)
POTASSIUM SERPL-SCNC: 2.9 MMOL/L (ref 3.5–5.3)
POTASSIUM SERPL-SCNC: 3.3 MMOL/L (ref 3.5–5.3)
POTASSIUM UR-SCNC: 12.4 MMOL/L (ref 1–300)
POTASSIUM UR-SCNC: 2.9 MMOL/L (ref 1–300)
PROT SERPL-MCNC: 6.6 G/DL (ref 6.4–8.2)
PROTHROMBIN TIME: 13.1 SECONDS (ref 11.8–14.2)
RBC # BLD AUTO: 3.9 MILLION/UL (ref 3.81–5.12)
SODIUM 24H UR-SCNC: 46 MOL/L
SODIUM SERPL-SCNC: 140 MMOL/L (ref 136–145)
SODIUM SERPL-SCNC: 141 MMOL/L (ref 136–145)
SODIUM SERPL-SCNC: 141 MMOL/L (ref 136–145)
SPECIMEN SOURCE: ABNORMAL
WBC # BLD AUTO: 5.69 THOUSAND/UL (ref 4.31–10.16)

## 2018-09-22 PROCEDURE — 83735 ASSAY OF MAGNESIUM: CPT | Performed by: PHYSICIAN ASSISTANT

## 2018-09-22 PROCEDURE — 82330 ASSAY OF CALCIUM: CPT | Performed by: PHYSICIAN ASSISTANT

## 2018-09-22 PROCEDURE — 85730 THROMBOPLASTIN TIME PARTIAL: CPT | Performed by: PHYSICIAN ASSISTANT

## 2018-09-22 PROCEDURE — 80048 BASIC METABOLIC PNL TOTAL CA: CPT | Performed by: PHYSICIAN ASSISTANT

## 2018-09-22 PROCEDURE — 99232 SBSQ HOSP IP/OBS MODERATE 35: CPT | Performed by: PHYSICIAN ASSISTANT

## 2018-09-22 PROCEDURE — 84300 ASSAY OF URINE SODIUM: CPT | Performed by: PHYSICIAN ASSISTANT

## 2018-09-22 PROCEDURE — 80053 COMPREHEN METABOLIC PANEL: CPT | Performed by: PHYSICIAN ASSISTANT

## 2018-09-22 PROCEDURE — 85610 PROTHROMBIN TIME: CPT | Performed by: PHYSICIAN ASSISTANT

## 2018-09-22 PROCEDURE — 82570 ASSAY OF URINE CREATININE: CPT | Performed by: INTERNAL MEDICINE

## 2018-09-22 PROCEDURE — 36600 WITHDRAWAL OF ARTERIAL BLOOD: CPT

## 2018-09-22 PROCEDURE — 84133 ASSAY OF URINE POTASSIUM: CPT | Performed by: PHYSICIAN ASSISTANT

## 2018-09-22 PROCEDURE — 82088 ASSAY OF ALDOSTERONE: CPT | Performed by: PHYSICIAN ASSISTANT

## 2018-09-22 PROCEDURE — 82805 BLOOD GASES W/O2 SATURATION: CPT | Performed by: PHYSICIAN ASSISTANT

## 2018-09-22 PROCEDURE — 84100 ASSAY OF PHOSPHORUS: CPT | Performed by: PHYSICIAN ASSISTANT

## 2018-09-22 PROCEDURE — 85027 COMPLETE CBC AUTOMATED: CPT | Performed by: PHYSICIAN ASSISTANT

## 2018-09-22 RX ORDER — MIDODRINE HYDROCHLORIDE 5 MG/1
2.5 TABLET ORAL
Status: DISCONTINUED | OUTPATIENT
Start: 2018-09-22 | End: 2018-09-24 | Stop reason: HOSPADM

## 2018-09-22 RX ORDER — POTASSIUM CHLORIDE AND SODIUM CHLORIDE 900; 300 MG/100ML; MG/100ML
50 INJECTION, SOLUTION INTRAVENOUS CONTINUOUS
Status: DISCONTINUED | OUTPATIENT
Start: 2018-09-22 | End: 2018-09-23

## 2018-09-22 RX ORDER — POTASSIUM CHLORIDE 14.9 MG/ML
20 INJECTION INTRAVENOUS ONCE
Status: COMPLETED | OUTPATIENT
Start: 2018-09-22 | End: 2018-09-22

## 2018-09-22 RX ORDER — SPIRONOLACTONE 25 MG/1
25 TABLET ORAL DAILY
Status: DISCONTINUED | OUTPATIENT
Start: 2018-09-22 | End: 2018-09-24 | Stop reason: HOSPADM

## 2018-09-22 RX ADMIN — TOPIRAMATE 25 MG: 25 TABLET, FILM COATED ORAL at 21:14

## 2018-09-22 RX ADMIN — DIBASIC SODIUM PHOSPHATE, MONOBASIC POTASSIUM PHOSPHATE AND MONOBASIC SODIUM PHOSPHATE 1 TABLET: 852; 155; 130 TABLET ORAL at 08:53

## 2018-09-22 RX ADMIN — AMITRIPTYLINE HYDROCHLORIDE 50 MG: 50 TABLET, FILM COATED ORAL at 21:14

## 2018-09-22 RX ADMIN — MECLIZINE HYDROCHLORIDE 25 MG: 25 TABLET ORAL at 21:14

## 2018-09-22 RX ADMIN — POTASSIUM CHLORIDE 40 MEQ: 20 SOLUTION ORAL at 16:49

## 2018-09-22 RX ADMIN — POTASSIUM CHLORIDE 40 MEQ: 20 SOLUTION ORAL at 21:04

## 2018-09-22 RX ADMIN — SPIRONOLACTONE 25 MG: 25 TABLET, FILM COATED ORAL at 16:48

## 2018-09-22 RX ADMIN — CALCIUM CARBONATE-VITAMIN D TAB 500 MG-200 UNIT 1 TABLET: 500-200 TAB at 16:48

## 2018-09-22 RX ADMIN — MIDODRINE HYDROCHLORIDE 2.5 MG: 5 TABLET ORAL at 16:48

## 2018-09-22 RX ADMIN — CYANOCOBALAMIN TAB 500 MCG 500 MCG: 500 TAB at 08:53

## 2018-09-22 RX ADMIN — POTASSIUM CHLORIDE AND SODIUM CHLORIDE 125 ML/HR: 900; 300 INJECTION, SOLUTION INTRAVENOUS at 11:26

## 2018-09-22 RX ADMIN — CALCIUM CARBONATE-VITAMIN D TAB 500 MG-200 UNIT 1 TABLET: 500-200 TAB at 08:53

## 2018-09-22 RX ADMIN — DIBASIC SODIUM PHOSPHATE, MONOBASIC POTASSIUM PHOSPHATE AND MONOBASIC SODIUM PHOSPHATE 1 TABLET: 852; 155; 130 TABLET ORAL at 16:48

## 2018-09-22 RX ADMIN — VITAMIN D, TAB 1000IU (100/BT) 1000 UNITS: 25 TAB at 08:53

## 2018-09-22 RX ADMIN — POTASSIUM CHLORIDE 40 MEQ: 20 SOLUTION ORAL at 08:53

## 2018-09-22 RX ADMIN — POTASSIUM CHLORIDE 20 MEQ: 200 INJECTION, SOLUTION INTRAVENOUS at 11:32

## 2018-09-22 RX ADMIN — DIBASIC SODIUM PHOSPHATE, MONOBASIC POTASSIUM PHOSPHATE AND MONOBASIC SODIUM PHOSPHATE 1 TABLET: 852; 155; 130 TABLET ORAL at 21:14

## 2018-09-22 RX ADMIN — POTASSIUM CHLORIDE AND SODIUM CHLORIDE 50 ML/HR: 900; 300 INJECTION, SOLUTION INTRAVENOUS at 23:25

## 2018-09-22 RX ADMIN — POTASSIUM CHLORIDE 20 MEQ: 200 INJECTION, SOLUTION INTRAVENOUS at 01:57

## 2018-09-22 RX ADMIN — MULTIPLE VITAMINS W/ MINERALS TAB 1 TABLET: TAB at 08:52

## 2018-09-22 RX ADMIN — DIBASIC SODIUM PHOSPHATE, MONOBASIC POTASSIUM PHOSPHATE AND MONOBASIC SODIUM PHOSPHATE 1 TABLET: 852; 155; 130 TABLET ORAL at 11:33

## 2018-09-22 RX ADMIN — SODIUM CHLORIDE AND POTASSIUM CHLORIDE 125 ML/HR: .9; .15 SOLUTION INTRAVENOUS at 04:18

## 2018-09-22 NOTE — ASSESSMENT & PLAN NOTE
-Reports being followed by weight management   -Monitor electrolytes  -Continue multivitamins  - Question malabsorption due to gastric bypass?

## 2018-09-22 NOTE — CONSULTS
Consultation - Nephrology   Melinda Bond 28 y o  female MRN: [id number]  Unit/Bed#: 238-05 Encounter: [id number]      A/P:  1  Hypokalemia    Patient's potassium significantly improved with additional supplementation  As mentioned history present illness, the patient recently had her potassium increased from 80 mEq PO to 120 mEq PO by our office  Despite this the patient's potassium continue to decrease slowly  Patient was also switched from tablets to liquid potassium in order to better absorbed the supplement  Patient was doing well in the outpatient setting, however, for an unknown reason this has now worsened and the patient is once again not able to absorb appropriately  Patient's urine potassium was checked in the setting of a hypokalemic serum and the urine potassium was noted to be appropriately low  Of note a transtubular potassium gradient is no longer recommended as a appropriate equation for tubular potassium absorption  Of note, the patient did have another aldosterone level checked which I believe is appropriate and we shall follow this up and comes back in a couple days  2  Hypophosphatemia   Patient's phosphorus levels have been appropriate on outpatient potassium phosphate supplementation  Will check to make sure the patient is currently on the supplementation in the inpatient setting  3  Respiratory alkalosis    Patient's history confirms that she was most likely experiencing mild hyperventilation due to the paresthesias she was experiencing  Of note this can worsen hypokalemia due to gradients and ion shifts intracellularly  At this time the patient appears appropriate is not hyperventilating  4  Paresthesias -improved with electrolyte improvement  5  Vitamin-D deficiency continue with vitamin-D supplementation            Thank you for allowing us to participate in the care of your patient       Please feel free to contact us regarding the care of this patient, or any other questions/concerns that may be applicable  Patient Active Problem List   Diagnosis    Hypokalemia    History of gastric bypass    Migraine without aura and without status migrainosus, not intractable    Left-sided weakness    Hypocalcemia    Hypophosphatemia    Anemia    Vitamin D deficiency    Weakness of both lower extremities    Weakness of both upper extremities    Elevated CPK    Vitamin B12 deficiency    Cervical lymphadenopathy    Generalized weakness    Paresthesia of both lower extremities    Paresthesia       History of Present Illness   Physician Requesting Consult: Jonel Bloom MD  Reason for Consult / Principal Problem: Hypokalemia  Hx and PE limited by:   HPI: Jeremy Miller is a 28y o  year old female who presented to the emergency department September 21, 2018 with complaints of paresthesias specifically around the perioral area and other portions of the face consistent with prior hypokalemic events  Patient had additional inpatient stay at [facility] for low potassium for several days  At that time be determined that the patient does not have a potassium wasting issue from the renal tubular standpoint and felt that she was having absorption problems  When we previously saw the patient, would come to a similar conclusion  At presentation the patient's potassium was 2 0 millimole/L, patient's sats supplementation and her potassium is now to 2 6 millimole/L  Of note she denies any nausea vomiting diarrhea  She notes that she has had some decreased p o  intake, however she has been continuing her potassium supplementation which remains on liquid potassium 10% at 40 mEq t i d   Patient denies any new supplements specifically herbal supplements or any other food products supplement  Patient also denies any ingestion of alcohol  Patient's magnesium level was normal patient's urine potassium on September 21st at 2100 hours came back at 2 9 millimole/L    Patient was given additional potassium supplementation via the IV as well as we will route which has improved her overall potassium levels  History obtained from chart review and the patient    Review of Systems - Negative except as mentioned above in HPI, more specifics as mentioned below    Review of Systems - General ROS: positive for  - fatigue  Psychological ROS: negative  Ophthalmic ROS: negative  ENT ROS: negative  Allergy and Immunology ROS: negative  Hematological and Lymphatic ROS: negative  Endocrine ROS: negative  Respiratory ROS: no cough, shortness of breath, or wheezing  Cardiovascular ROS: no chest pain or dyspnea on exertion  Gastrointestinal ROS: no abdominal pain, change in bowel habits, or black or bloody stools  Genito-Urinary ROS: no dysuria, trouble voiding, or hematuria  Musculoskeletal ROS: positive for - muscular weakness  Neurological ROS: positive for - numbness/tingling  Dermatological ROS: negative    Historical Information   Past Medical History:   Diagnosis Date    Hypokalemia     Migraine      Past Surgical History:   Procedure Laterality Date    ABDOMINAL SURGERY      APPENDECTOMY      CHOLECYSTECTOMY      GASTRIC BYPASS      HYSTERECTOMY       Social History   History   Alcohol Use    Yes     Comment: socially     History   Drug Use No     History   Smoking Status    Never Smoker   Smokeless Tobacco    Never Used     Family History   Problem Relation Age of Onset    Hypertension Father     Diabetes Father     Diabetes Maternal Grandfather        Meds/Allergies   all current active meds have been reviewed, current meds: Current Facility-Administered Medications   Medication Dose Route Frequency    acetaminophen (TYLENOL) tablet 650 mg  650 mg Oral Q6H PRN    amitriptyline (ELAVIL) tablet 50 mg  50 mg Oral HS    butalbital-acetaminophen-caffeine (FIORICET,ESGIC) -40 mg per tablet 1 tablet  1 tablet Oral Q4H PRN    calcium carbonate-vitamin D (OSCAL-D) 500 mg-200 units per tablet 1 tablet  1 tablet Oral BID With Meals    cholecalciferol (VITAMIN D3) tablet 1,000 Units  1,000 Units Oral Daily    cyanocobalamin (VITAMIN B-12) tablet 500 mcg  500 mcg Oral Daily    meclizine (ANTIVERT) tablet 25 mg  25 mg Oral HS    multivitamin-minerals (CENTRUM) tablet 1 tablet  1 tablet Oral Daily    ondansetron (ZOFRAN) injection 4 mg  4 mg Intravenous Q6H PRN    potassium chloride 10 % oral solution 40 mEq  40 mEq Oral TID    potassium-sodium phosphateS (K-PHOS,PHOSPHA 250) -250 mg tablet 1 tablet  1 tablet Oral 4x Daily (with meals and at bedtime)    sodium chloride 0 9 % with KCl 40 mEq/L infusion (premix)  125 mL/hr Intravenous Continuous    topiramate (TOPAMAX) tablet 25 mg  25 mg Oral HS    and PTA meds:  Prescriptions Prior to Admission   Medication    amitriptyline (ELAVIL) 50 mg tablet    butalbital-acetaminophen-caffeine (FIORICET,ESGIC) -40 mg per tablet    cholecalciferol (VITAMIN D3) 1,000 units tablet    meclizine (ANTIVERT) 25 mg tablet    Multiple Vitamins-Minerals (MULTIVITAMIN WITH MINERALS) tablet    potassium chloride 10 %    potassium chloride 10 %    potassium-sodium phosphateS (K-PHOS,PHOSPHA 250) 155-852-130 mg tablet    potassium-sodium phosphateS (K-PHOS,PHOSPHA 250) 155-852-130 mg tablet    topiramate (TOPAMAX) 50 MG tablet    amitriptyline (ELAVIL) 100 mg tablet    calcium carbonate-vitamin D (OSCAL-D) 500 mg-200 units per tablet    cyanocobalamin 500 MCG tablet    meclizine (ANTIVERT) 25 mg tablet         Allergies   Allergen Reactions    Nsaids      Other reaction(s): Other (Please comment)  Should not take s/p bariatric surgery       Objective     Intake/Output Summary (Last 24 hours) at 09/22/18 1546  Last data filed at 09/22/18 1300   Gross per 24 hour   Intake           1537 5 ml   Output              450 ml   Net           1087 5 ml       Invasive Devices:        Physical Exam      I/O last 3 completed shifts:   In: 937 5 [I V :937 5]  Out: 350 [Urine:350]    Vitals:    09/22/18 1507   BP: 112/55   Pulse: 75   Resp: 18   Temp: 98 8 °F (37 1 °C)   SpO2: 100%       Gen: in NAD, Alert/Awake  HEENT: no sclerous icterus, MMM, neck supple  CV: +S1/S2, RRR  Lungs: CTA bilaterally  Abd: +BS, soft NT/ND  Ext: all four extremities are warm  Skin: no rashes noted  Neuro: CN II-XII intact    Current Weight: Weight - Scale: 67 4 kg (148 lb 9 6 oz)  First Weight: Weight - Scale: 66 1 kg (145 lb 11 2 oz)    Lab Results:  I have personally reviewed pertinent labs      CBC: Lab Results   Component Value Date    WBC 5 69 09/22/2018    HGB 11 8 09/22/2018    HCT 33 8 (L) 09/22/2018    MCV 87 09/22/2018     09/22/2018    MCH 30 3 09/22/2018    MCHC 34 9 09/22/2018    RDW 13 9 09/22/2018    MPV 10 6 09/22/2018    NRBC 0 09/21/2018     CMP: Lab Results   Component Value Date     09/22/2018    K 3 3 (L) 09/22/2018     (H) 09/22/2018    CO2 20 (L) 09/22/2018    BUN 12 09/22/2018    CREATININE 0 95 09/22/2018    CALCIUM 7 9 (L) 09/22/2018    AST 19 09/22/2018    ALT 22 09/22/2018    ALKPHOS 46 09/22/2018    EGFR 78 09/22/2018     Phosphorus:   Lab Results   Component Value Date    PHOS 4 0 09/22/2018     Magnesium:   Lab Results   Component Value Date    MG 2 3 09/22/2018     Urinalysis: No results found for: Tacy Serve, SPECGRAV, PHUR, LEUKOCYTESUR, NITRITE, PROTEINUA, GLUCOSEU, KETONESU, BILIRUBINUR, BLOODU  Ionized Calcium: No results found for: CAION  Coagulation:   Lab Results   Component Value Date    INR 1 04 09/22/2018     Troponin: No results found for: TROPONINI  ABG: Lab Results   Component Value Date    PHART 7 459 (H) 09/22/2018    IGK0YVH 26 1 (LL) 09/22/2018    PO2ART 115 1 09/22/2018    PVA2NMR 18 1 (L) 09/22/2018    BEART -4 4 09/22/2018    SOURCE Radial, Left 09/22/2018         Results from last 7 days  Lab Units 09/22/18  1301 09/22/18  0857 09/22/18  0409  09/21/18  1740   SODIUM mmol/L 140 141 141  < > 135* POTASSIUM mmol/L 3 3* 2 6* 2 9*  < > 2 0*   CHLORIDE mmol/L 109* 108 108  < > 100   CO2 mmol/L 20* 20* 21  < > 25   BUN mg/dL 12 13 14  < > 15   CREATININE mg/dL 0 95 1 03 0 88  < > 1 02   CALCIUM mg/dL 7 9* 8 0* 7 9*  < > 8 9   ALK PHOS U/L  --   --  46  --  52   ALT U/L  --   --  22  --  22   AST U/L  --   --  19  --  20   < > = values in this interval not displayed  Radiology review:  No results found  Counseling / Coordination of Care  Total ADDITIONAL floor / unit time spent today 35 minutes  Greater than 50% of total time was spent with the patient and / or family counseling and / or coordination of care  A description of the counseling / coordination of care: review of prior history and treatment course, future treatments depending blood work and how the patient progresses clinically  Future follow-up as well as potential underlying causes      Velasquez Bloom DO

## 2018-09-22 NOTE — PROGRESS NOTES
Progress Note - Manoj Lynn 1985, 28 y o  female MRN: [id number]    Unit/Bed#: 278-41 Encounter: [id number]    Primary Care Provider: Rhonda Marquez MD   Date and time admitted to hospital: 9/21/2018  5:02 PM        * Hypokalemia   Assessment & Plan    -Potassium at 2 0 upon arrival at ER  -She reports feeling fatigued with Paresthesia in extremities and face  -She has been having multiple episodes of low potassium levels despite being on daily potassium supplement  - EKG shows sinus rhythm with premature atrial complexes at a rate of 74 beats per minute  - continue Telemetry monitoring   -Check urine potassium, urine sodium, aldosterone level  - Continue both PO and IV potassium supplementation    - Continue q 8h BMP monitoring   -Nephrology consult pending        Vitamin D deficiency   Assessment & Plan    -Continue vitamin D3        Hypocalcemia   Assessment & Plan    Will check ionized calcium          Migraine without aura and without status migrainosus, not intractable   Assessment & Plan    -No reports of current headaches  -Continue home medication        History of gastric bypass   Assessment & Plan    -Reports being followed by weight management   -Monitor electrolytes  -Continue multivitamins  - Question malabsorption due to gastric bypass? VTE Pharmacologic Prophylaxis:   Pharmacologic: low risk  Mechanical VTE Prophylaxis in Place: Yes        Time Spent for Care: 30 minutes  More than 50% of total time spent on counseling and coordination of care as described above  Current Length of Stay: 1 day(s)    Current Patient Status: Inpatient   Certification Statement: The patient will continue to require additional inpatient hospital stay due to continue IV fluids, IV potassium replacement and frequent labs  Discharge Plan / Estimated Discharge Date: TBD      Code Status: Level 1 - Full Code      Subjective:   Patient is feeling better, less weak and less tingling   Less body aches today  But still feels very tired and not herself  No chest pain  Good appetite  No reports of change in bowel habits  Objective:     Vitals:   Temp (24hrs), Av °F (36 7 °C), Min:97 2 °F (36 2 °C), Max:99 2 °F (37 3 °C)    HR:  [74-87] 77  Resp:  [13-20] 18  BP: ()/(50-63) 103/55  SpO2:  [97 %-100 %] 100 %  Body mass index is 27 18 kg/m²  Input and Output Summary (last 24 hours): Intake/Output Summary (Last 24 hours) at 18 1249  Last data filed at 18 0900   Gross per 24 hour   Intake           1057 5 ml   Output              350 ml   Net            707 5 ml       Physical Exam:     Physical Exam   Constitutional: She is oriented to person, place, and time  She appears well-developed and well-nourished  HENT:   Mouth/Throat: Oropharynx is clear and moist    Neck: No JVD present  Cardiovascular: Normal rate and normal heart sounds  No murmur heard  Pulmonary/Chest: Effort normal  No respiratory distress  She has no wheezes  Abdominal: Soft  She exhibits no distension  Musculoskeletal: She exhibits no edema  Neurological: She is alert and oriented to person, place, and time  Skin: Skin is warm and dry  Multiple tattoos  Psychiatric: She has a normal mood and affect  Nursing note and vitals reviewed          Additional Data:   Labs:    Results from last 7 days  Lab Units 18  0409 18  1740   WBC Thousand/uL 5 69 7 19   HEMOGLOBIN g/dL 11 8 13 4   HEMATOCRIT % 33 8* 38 4   PLATELETS Thousands/uL 280 301   NEUTROS PCT %  --  46   LYMPHS PCT %  --  42   MONOS PCT %  --  9   EOS PCT %  --  2       Results from last 7 days  Lab Units 18  0857 18  0409   SODIUM mmol/L 141 141   POTASSIUM mmol/L 2 6* 2 9*   CHLORIDE mmol/L 108 108   CO2 mmol/L 20* 21   BUN mg/dL 13 14   CREATININE mg/dL 1 03 0 88   CALCIUM mg/dL 8 0* 7 9*   ALK PHOS U/L  --  46   ALT U/L  --  22   AST U/L  --  19       Results from last 7 days  Lab Units 18  0410   INR  1 04 * I Have Reviewed All Lab Data Listed Above  * Additional Pertinent Lab Tests Reviewed: All Labs Within Last 24 Hours Reviewed    Imaging:  Imaging Reports Reviewed Today Include: no new imaging to review  Recent Cultures (last 7 days):         Last 24 Hours Medication List:     Current Facility-Administered Medications:  acetaminophen 650 mg Oral Q6H PRN Ajit Moore PA-C    amitriptyline 50 mg Oral HS Ajit Moore PA-C    butalbital-acetaminophen-caffeine 1 tablet Oral Q4H PRN Ajit Moore PA-C    calcium carbonate-vitamin D 1 tablet Oral BID With Meals Ajit Moore PA-C    cholecalciferol 1,000 Units Oral Daily Ajit Moore PA-C    cyanocobalamin 500 mcg Oral Daily Ajit Moore PA-C    meclizine 25 mg Oral HS Ajit Moore PA-C    multivitamin-minerals 1 tablet Oral Daily Ajit Moore PA-C    ondansetron 4 mg Intravenous Q6H PRN Ajit Moore PA-C    potassium chloride 40 mEq Oral TID Ajit Moore PA-C    potassium chloride 20 mEq Intravenous Once Luis Alfredo Sotomayor PA-C Last Rate: 20 mEq (09/22/18 1132)   potassium-sodium phosphateS 1 tablet Oral 4x Daily (with meals and at bedtime) Ajit Moore PA-C    sodium chloride 0 9 % with KCl 40 mEq/L 125 mL/hr Intravenous Continuous Colleen Angeles PA-C Last Rate: 125 mL/hr (09/22/18 1126)   topiramate 25 mg Oral HS Ajit Moore PA-C         Today, Patient Was Seen By: Luis Alfredo Sotomayor PA-C    ** Please Note: Dragon 360 Dictation voice to text software may have been used in the creation of this document   **

## 2018-09-22 NOTE — H&P
[facility] Internal Medicine  H&P- Shala Sheets 1985, 28 y o  female MRN: [id number]    Unit/Bed#: 368-72 Encounter: [id number]    Primary Care Provider: Chace Garcia MD   Date and time admitted to hospital: 9/21/2018  5:02 PM        * Hypokalemia   Assessment & Plan    -Potassium at 2 0 upon arrival at ER  -Se reports feeling fatigued with Paresthesia in extremities and face  -She has been having multiple episodes of low potassium levels despite being on daily potassium supplement  - EKG shows sinus rhythm with premature atrial complexes at a rate of 74 beats per minute   -Admit to [facility]  -Telemetry monitoring  -IV normal saline with K  -BMP Q 4 hours  -Continue oral potassium supplement   -Check urine potassium  -AM labs  -Nephrology consult        History of gastric bypass   Assessment & Plan    -Reports being followed by weight management   -Monitor electrolytes  -Continue multivitamins        Vitamin D deficiency   Assessment & Plan    -Continue vitamin D3        Migraine without aura and without status migrainosus, not intractable   Assessment & Plan    -No reports of current headaches  -Continue home medication                      VTE Prophylaxis: Low risk after VTE screen  / Low Risk after VTE Screen   Code Status: Level 1 full code  POLST: POLST is not applicable to this patient  Discussion with family: None    Anticipated Length of Stay:  Patient will be admitted on an Inpatient basis with an anticipated length of stay of  > 2 midnights  Justification for Hospital Stay: Hypokalemia    Total Time for Visit, including Counseling / Coordination of Care: 30 minutes  Greater than 50% of this total time spent on direct patient counseling and coordination of care  Chief Complaint: Numbness and tingling in Extremities      History of Present Illness:    Shala Sheets is a 28 y o  female who presents to the emergency room with complaints of numbness and tingling to extremities and extreme fatigue  She reports a numbness and tingling occurring intermittently over the past 2-3 weeks  She has been having multiple episodes of low potassium and was admitted back in August for similar episode  She was called by her PCP who is in [place] and was told to come to the ER because her potassium is at 2 4  She does recall she had onset of the numbness and tingling and extreme fatigue prior to being told that her potassium was low  She denies chest pain, shortness of breath, abdominal pain, fever, chills,  Vomiting, diarrhea  Review of Systems:    Review of Systems   Constitutional: Positive for appetite change and fatigue  Negative for chills and unexpected weight change  HENT: Negative for sore throat, tinnitus and trouble swallowing  Eyes: Negative for photophobia, redness and visual disturbance  Respiratory: Negative for cough, shortness of breath and wheezing  Cardiovascular: Negative for chest pain, palpitations and leg swelling  Gastrointestinal: Positive for nausea  Negative for abdominal pain, diarrhea and vomiting  Endocrine: Negative for polydipsia and polyphagia  Genitourinary: Negative for dysuria, flank pain, frequency and hematuria  Musculoskeletal: Negative for arthralgias, back pain, joint swelling and neck pain  Skin: Negative for color change, pallor and rash  Neurological: Negative for dizziness, weakness, light-headedness and headaches  Hematological: Negative for adenopathy  Does not bruise/bleed easily  Psychiatric/Behavioral: Negative for agitation, confusion and sleep disturbance  The patient is not nervous/anxious          Past Medical and Surgical History:     Past Medical History:   Diagnosis Date    Hypokalemia     Migraine        Past Surgical History:   Procedure Laterality Date    ABDOMINAL SURGERY      APPENDECTOMY      CHOLECYSTECTOMY      GASTRIC BYPASS      HYSTERECTOMY         Meds/Allergies:    Prior to Admission medications    Medication Sig Start Date End Date Taking?  Authorizing Provider   amitriptyline (ELAVIL) 50 mg tablet Take 50 mg by mouth daily at bedtime 8/27/18 8/27/19 Yes Historical Provider, MD   butalbital-acetaminophen-caffeine (FIORICET,ESGIC) -40 mg per tablet Take 1 tablet by mouth every 4 (four) hours as needed for headaches 7/15/18  Yes Yadi Alexnader MD   cholecalciferol (VITAMIN D3) 1,000 units tablet Take 1 tablet (1,000 Units total) by mouth daily 7/11/18  Yes Saige Pastor DO   meclizine (ANTIVERT) 25 mg tablet Take 25 mg by mouth daily at bedtime 8/27/18  Yes Historical Provider, MD   Multiple Vitamins-Minerals (MULTIVITAMIN WITH MINERALS) tablet Take 1 tablet by mouth daily 7/15/18  Yes Yadi Alexander MD   potassium chloride 10 % Take 30 mL (40 mEq total) by mouth 2 (two) times a day  Patient taking differently: Take 40 mEq by mouth 3 (three) times a day   8/17/18  Yes Janessa Cowan PA-C   potassium chloride 10 % Take 40 mEq by mouth 3 (three) times a day with meals 9/3/18 10/3/18 Yes Historical Provider, MD   potassium-sodium phosphateS (K-PHOS,PHOSPHA 250) 155-852-130 mg tablet Take 1 tablet by mouth 4 (four) times a day (with meals and at bedtime) 8/17/18  Yes Janessa Cowan PA-C   potassium-sodium phosphateS (K-PHOS,PHOSPHA 250) 155-852-130 mg tablet Take 1 tablet by mouth daily 8/17/18  Yes Historical Provider, MD   topiramate (TOPAMAX) 50 MG tablet Take 25 mg by mouth daily at bedtime     Yes Historical Provider, MD   amitriptyline (ELAVIL) 100 mg tablet Take 25 mg by mouth daily at bedtime    Historical Provider, MD   calcium carbonate-vitamin D (OSCAL-D) 500 mg-200 units per tablet Take 1 tablet by mouth 2 (two) times a day with meals 7/10/18   Saige Pastor DO   cyanocobalamin 500 MCG tablet Take 1 tablet (500 mcg total) by mouth daily 7/11/18   Saige Pastor DO   meclizine (ANTIVERT) 25 mg tablet Take 1 tablet (25 mg total) by mouth every 12 (twelve) hours as needed for dizziness 7/10/18 [employer], DO     I have reviewed home medications with patient personally  Allergies: Allergies   Allergen Reactions    Nsaids      Other reaction(s): Other (Please comment)  Should not take s/p bariatric surgery       Social History:     Marital Status: /Civil Union   Occupation: Stay home Mom  Patient Pre-hospital Living Situation: Lives with family  Patient Pre-hospital Level of Mobility: Active  Patient Pre-hospital Diet Restrictions: None reported, but has hx of Gastric bypass  Substance Use History:   History   Alcohol Use    Yes     Comment: socially     History   Smoking Status    Never Smoker   Smokeless Tobacco    Never Used     History   Drug Use No       Family History:    Family History   Problem Relation Age of Onset    Hypertension Father     Diabetes Father     Diabetes Maternal Grandfather        Physical Exam:     Vitals:   Blood Pressure: 106/55 (09/21/18 1940)  Pulse: 87 (09/21/18 1940)  Temperature: (!) 97 2 °F (36 2 °C) (09/21/18 1940)  Temp Source: Temporal (09/21/18 1940)  Respirations: 16 (09/21/18 1940)  Height: 5' 2" (157 5 cm) (09/21/18 1940)  Weight - Scale: 66 2 kg (145 lb 15 1 oz) (09/21/18 1940)  SpO2: 98 % (09/21/18 1940)    Physical Exam   Constitutional: She is oriented to person, place, and time  No distress  HENT:   Head: Normocephalic and atraumatic  Mouth/Throat: Oropharynx is clear and moist    Eyes: EOM are normal  Pupils are equal, round, and reactive to light  Right eye exhibits no discharge  Left eye exhibits no discharge  Neck: Normal range of motion  Neck supple  No JVD present  Cardiovascular: Normal rate, regular rhythm and intact distal pulses  Exam reveals no friction rub  No murmur heard  Pulmonary/Chest: Effort normal and breath sounds normal  No stridor  No respiratory distress  She has no wheezes  She has no rales  Abdominal: Soft  Bowel sounds are normal  She exhibits no distension  There is no tenderness   There is no rebound  Musculoskeletal: Normal range of motion  She exhibits no edema, tenderness or deformity  Neurological: She is oriented to person, place, and time  She displays normal reflexes  She exhibits normal muscle tone  Coordination normal    Skin: Skin is warm and dry  No rash noted  She is not diaphoretic  No erythema  No pallor  Psychiatric: She has a normal mood and affect  Vitals reviewed  Additional Data:     Lab Results: I have personally reviewed pertinent reports  Results from last 7 days  Lab Units 09/21/18  1740   WBC Thousand/uL 7 19   HEMOGLOBIN g/dL 13 4   HEMATOCRIT % 38 4   PLATELETS Thousands/uL 301   NEUTROS PCT % 46   LYMPHS PCT % 42   MONOS PCT % 9   EOS PCT % 2       Results from last 7 days  Lab Units 09/21/18  2028 09/21/18  1740   SODIUM mmol/L 138 135*   POTASSIUM mmol/L 2 3* 2 0*   CHLORIDE mmol/L 102 100   CO2 mmol/L 25 25   BUN mg/dL 13 15   CREATININE mg/dL 0 93 1 02   CALCIUM mg/dL 8 1* 8 9   ALK PHOS U/L  --  52   ALT U/L  --  22   AST U/L  --  20                   Imaging: I have personally reviewed pertinent reports  No orders to display       EKG, Pathology, and Other Studies Reviewed on Admission:   · EKG:  Sinus rhythm with premature atrial complexes at a rate of 74 beats per minute  Allscripts / Epic Records Reviewed: Yes     ** Please Note: This note has been constructed using a voice recognition system   **

## 2018-09-22 NOTE — CASE MANAGEMENT
Initial Clinical Review    Admission: Date/Time/Statement: 9/21/18 @ 1844     Orders Placed This Encounter   Procedures    Inpatient Admission (expected length of stay for this patient is greater than two midnights)     Standing Status:   Standing     Number of Occurrences:   1     Order Specific Question:   Admitting Physician     Answer:   Chuckie Byrnes [[id number]]     Order Specific Question:   Level of Care     Answer:   Med Surg [16]     Order Specific Question:   Estimated length of stay     Answer:   More than 2 Midnights     Order Specific Question:   Certification     Answer:   I certify that inpatient services are medically necessary for this patient for a duration of greater than two midnights  See H&P and MD Progress Notes for additional information about the patient's course of treatment  ED: Date/Time/Mode of Arrival:   ED Arrival Information     Expected Arrival Acuity Means of Arrival Escorted By Service Admission Type    - 9/21/2018 16:43 Urgent Walk-In Family Member General Medicine Urgent    Arrival Complaint    abnormal lab        Chief Complaint:   Chief Complaint   Patient presents with    Abnormal Lab     Patient had Potassium level drawn today and was called by her PCP that Potassium was 2 4 and to come to ER  numbess and tingling in extremities, loss of appetite, fatigued  History of Illness:    Jayme Barclay is a 28 y o  female who presents to the emergency room with complaints of numbness and tingling to extremities and extreme fatigue  She reports a numbness and tingling occurring intermittently over the past 2-3 weeks  She has been having multiple episodes of low potassium and was admitted back in August for similar episode  She was called by her PCP who is in [place] and was told to come to the ER because her potassium is at 2 4  She does recall she had onset of the numbness and tingling and extreme fatigue prior to being told that her potassium was low    She denies chest pain, shortness of breath, abdominal pain, fever, chills,  Vomiting, diarrhea       ED Vital Signs:   ED Triage Vitals   Temperature Pulse Respirations Blood Pressure SpO2   09/21/18 1658 09/21/18 1658 09/21/18 1658 09/21/18 1658 09/21/18 1658   98 3 °F (36 8 °C) 82 18 124/58 100 %      Temp Source Heart Rate Source Patient Position - Orthostatic VS BP Location FiO2 (%)   09/21/18 1658 09/21/18 1658 09/21/18 1658 09/21/18 1658 --   Temporal Monitor Sitting Right arm       Pain Score       09/21/18 2012       No Pain        Wt Readings from Last 1 Encounters:   09/22/18 67 4 kg (148 lb 9 6 oz)     Vital Signs (abnormal):   09/21/18 2331   97 4 °F (36 3 °C)  75  17  95/52  58  97 %  None (Room air)  Lying   09/21/18 1940   97 2 °F (36 2 °C)  87  16  106/55  --  98 %  None (Room air)  Sitting     Abnormal Labs:   09/22/18 1118   pH, Arterial 7 459     pCO2, Arterial 26 1     pO2, Arterial 115 1    HCO3, Arterial 18 1     Base Excess, Arterial -4 4    O2 Content, Arterial 16 0    O2 HGB,Arterial  97 6     SOURCE Radial, Left       9/21 9/22      Sodium 135 138 140 141 141 140   Potassium 2 0 2 3 2 5 2 9 2 6 3 3   Chloride 100 102 106 108 108 109   CO2 25 25 22 21 20 20   Calcium 8 9 8 1 8 0 7 9 8 0 7 9   Albumin 3 9   3 2       Diagnostic Test Results:     9/21 ECG - NSR w/ PACs    ED Treatment:   Medication Administration from 09/21/2018 1643 to 09/21/2018 1933       Date/Time Order Dose Route Action     09/21/2018 1853 potassium chloride 20 mEq IVPB (premix) 20 mEq Intravenous New Bag     09/21/2018 1852 potassium chloride 10 % oral solution 40 mEq 40 mEq Oral Given        Past Medical/Surgical History:    Active Ambulatory Problems     Diagnosis Date Noted    Hypokalemia 07/07/2018    History of gastric bypass 07/07/2018    Migraine without aura and without status migrainosus, not intractable 07/07/2018    Left-sided weakness 07/07/2018    Hypocalcemia 07/09/2018    Hypophosphatemia 07/09/2018    Anemia 07/10/2018    Vitamin D deficiency 07/10/2018    Weakness of both lower extremities 07/11/2018    Weakness of both upper extremities 07/11/2018    Elevated CPK 07/11/2018    Vitamin B12 deficiency 07/11/2018    Cervical lymphadenopathy 07/11/2018    Generalized weakness 07/12/2018    Paresthesia of both lower extremities 08/15/2018    Paresthesia 08/15/2018     Resolved Ambulatory Problems     Diagnosis Date Noted    Non-traumatic rhabdomyolysis 07/07/2018    Transaminitis 07/07/2018    Hypernatremia 07/09/2018     Past Medical History:   Diagnosis Date    Hypokalemia     Migraine      Admitting Diagnosis: Hypokalemia [E87 6]  Paresthesia [R20 2]  Abnormal blood chemistry [R79 9]  Generalized weakness [R53 1]    Age/Sex: 28 y o  female    Assessment/Plan:   * Hypokalemia   Assessment & Plan     -Potassium at 2 0 upon arrival at ER  -Se reports feeling fatigued with Paresthesia in extremities and face  -She has been having multiple episodes of low potassium levels despite being on daily potassium supplement  - EKG shows sinus rhythm with premature atrial complexes at a rate of 74 beats per minute   -Admit to [facility]  -Telemetry monitoring  -IV normal saline with K  -BMP Q 4 hours  -Continue oral potassium supplement   -Check urine potassium  -AM labs  -Nephrology consult          History of gastric bypass   Assessment & Plan     -Reports being followed by weight management   -Monitor electrolytes  -Continue multivitamins          Vitamin D deficiency   Assessment & Plan     -Continue vitamin D3          Migraine without aura and without status migrainosus, not intractable   Assessment & Plan     -No reports of current headaches  -Continue home medication          VTE Prophylaxis: Low risk after VTE screen  / Low Risk after VTE Screen   Code Status: Level 1 full code  Anticipated Length of Stay:  Patient will be admitted on an Inpatient basis with an anticipated length of stay of  > 2 midnights  Justification for Hospital Stay: Hypokalemia    Admission Orders:  Scheduled Meds:   Current Facility-Administered Medications:  acetaminophen 650 mg Oral Q6H PRN    amitriptyline 50 mg Oral HS    butalbital-acetaminophen-caffeine 1 tablet Oral Q4H PRN    calcium carbonate-vitamin D 1 tablet Oral BID With Meals    cholecalciferol 1,000 Units Oral Daily    cyanocobalamin 500 mcg Oral Daily    meclizine 25 mg Oral HS    midodrine 2 5 mg Oral TID AC    multivitamin-minerals 1 tablet Oral Daily    ondansetron 4 mg Intravenous Q6H PRN    potassium chloride 40 mEq Oral TID    potassium-sodium phosphateS 1 tablet Oral 4x Daily (with meals and at bedtime)    sodium chloride 0 9 % with KCl 40 mEq/L 50 mL/hr Intravenous Continuous Last Rate: 125 mL/hr (09/22/18 1126)   spironolactone 25 mg Oral Daily    topiramate 25 mg Oral HS      Continuous Infusions:   sodium chloride 0 9 % with KCl 40 mEq/L 50 mL/hr Last Rate: 125 mL/hr (09/22/18 1126)     PRN Meds:   acetaminophen    butalbital-acetaminophen-caffeine    ondansetron    Tele   Daily wt  Ambulate q shift  BMP q 8 hr   Diet regular   Cons Nephrology     Thank you,  [facility]  Utilization Review Department  Phone: [phone]; Fax [phone]  ATTENTION: Please call with any questions or concerns to [phone]  and carefully follow the prompts so that you are directed to the right person  Send all requests for admission clinical reviews, approved or denied determinations and any other requests to fax [phone]   All voicemails are confidential

## 2018-09-22 NOTE — ASSESSMENT & PLAN NOTE
-Potassium at 2 0 upon arrival at ER  -Se reports feeling fatigued with Paresthesia in extremities and face  -She has been having multiple episodes of low potassium levels despite being on daily potassium supplement  - EKG shows sinus rhythm with premature atrial complexes at a rate of 74 beats per minute   -Admit to [facility]  -Telemetry monitoring  -IV normal saline with K  -BMP Q 4 hours  -Continue oral potassium supplement   -Check urine potassium  -AM labs  -Nephrology consult

## 2018-09-22 NOTE — ASSESSMENT & PLAN NOTE
-Potassium at 2 0 upon arrival at ER  -She reports feeling fatigued with Paresthesia in extremities and face  -She has been having multiple episodes of low potassium levels despite being on daily potassium supplement  - EKG shows sinus rhythm with premature atrial complexes at a rate of 74 beats per minute  - continue Telemetry monitoring   -Check urine potassium, urine sodium, aldosterone level    - Continue both PO and IV potassium supplementation    - Continue q 8h BMP monitoring   -Nephrology consult pending

## 2018-09-23 PROBLEM — E83.51 HYPOCALCEMIA: Status: RESOLVED | Noted: 2018-07-09 | Resolved: 2018-09-23

## 2018-09-23 LAB
ANION GAP SERPL CALCULATED.3IONS-SCNC: 13 MMOL/L (ref 4–13)
BUN SERPL-MCNC: 12 MG/DL (ref 5–25)
CALCIUM SERPL-MCNC: 7.7 MG/DL (ref 8.3–10.1)
CHLORIDE SERPL-SCNC: 110 MMOL/L (ref 100–108)
CO2 SERPL-SCNC: 18 MMOL/L (ref 21–32)
CREAT SERPL-MCNC: 0.89 MG/DL (ref 0.6–1.3)
CREAT UR-MCNC: 193 MG/DL
CREAT UR-MCNC: 208 MG/DL
GFR SERPL CREATININE-BSD FRML MDRD: 85 ML/MIN/1.73SQ M
GLUCOSE SERPL-MCNC: 84 MG/DL (ref 65–140)
POTASSIUM SERPL-SCNC: 3.2 MMOL/L (ref 3.5–5.3)
POTASSIUM UR-SCNC: 14.3 MMOL/L (ref 1–300)
SODIUM SERPL-SCNC: 141 MMOL/L (ref 136–145)

## 2018-09-23 PROCEDURE — 82570 ASSAY OF URINE CREATININE: CPT | Performed by: INTERNAL MEDICINE

## 2018-09-23 PROCEDURE — 82128 AMINO ACIDS MULT QUAL: CPT | Performed by: INTERNAL MEDICINE

## 2018-09-23 PROCEDURE — 84133 ASSAY OF URINE POTASSIUM: CPT | Performed by: PHYSICIAN ASSISTANT

## 2018-09-23 PROCEDURE — 99232 SBSQ HOSP IP/OBS MODERATE 35: CPT | Performed by: PHYSICIAN ASSISTANT

## 2018-09-23 RX ADMIN — POTASSIUM CHLORIDE 40 MEQ: 20 SOLUTION ORAL at 10:09

## 2018-09-23 RX ADMIN — MIDODRINE HYDROCHLORIDE 2.5 MG: 5 TABLET ORAL at 16:24

## 2018-09-23 RX ADMIN — CYANOCOBALAMIN TAB 500 MCG 500 MCG: 500 TAB at 10:09

## 2018-09-23 RX ADMIN — CALCIUM CARBONATE-VITAMIN D TAB 500 MG-200 UNIT 1 TABLET: 500-200 TAB at 07:33

## 2018-09-23 RX ADMIN — MULTIPLE VITAMINS W/ MINERALS TAB 1 TABLET: TAB at 10:08

## 2018-09-23 RX ADMIN — DIBASIC SODIUM PHOSPHATE, MONOBASIC POTASSIUM PHOSPHATE AND MONOBASIC SODIUM PHOSPHATE 1 TABLET: 852; 155; 130 TABLET ORAL at 07:33

## 2018-09-23 RX ADMIN — MIDODRINE HYDROCHLORIDE 2.5 MG: 5 TABLET ORAL at 12:22

## 2018-09-23 RX ADMIN — MECLIZINE HYDROCHLORIDE 25 MG: 25 TABLET ORAL at 21:33

## 2018-09-23 RX ADMIN — SPIRONOLACTONE 25 MG: 25 TABLET, FILM COATED ORAL at 10:16

## 2018-09-23 RX ADMIN — POTASSIUM CHLORIDE 40 MEQ: 20 SOLUTION ORAL at 21:33

## 2018-09-23 RX ADMIN — AMITRIPTYLINE HYDROCHLORIDE 50 MG: 50 TABLET, FILM COATED ORAL at 21:33

## 2018-09-23 RX ADMIN — POTASSIUM CHLORIDE 40 MEQ: 20 SOLUTION ORAL at 16:24

## 2018-09-23 RX ADMIN — CALCIUM CARBONATE-VITAMIN D TAB 500 MG-200 UNIT 1 TABLET: 500-200 TAB at 16:24

## 2018-09-23 RX ADMIN — DIBASIC SODIUM PHOSPHATE, MONOBASIC POTASSIUM PHOSPHATE AND MONOBASIC SODIUM PHOSPHATE 1 TABLET: 852; 155; 130 TABLET ORAL at 16:24

## 2018-09-23 RX ADMIN — DIBASIC SODIUM PHOSPHATE, MONOBASIC POTASSIUM PHOSPHATE AND MONOBASIC SODIUM PHOSPHATE 1 TABLET: 852; 155; 130 TABLET ORAL at 12:22

## 2018-09-23 RX ADMIN — MIDODRINE HYDROCHLORIDE 2.5 MG: 5 TABLET ORAL at 06:35

## 2018-09-23 RX ADMIN — DIBASIC SODIUM PHOSPHATE, MONOBASIC POTASSIUM PHOSPHATE AND MONOBASIC SODIUM PHOSPHATE 1 TABLET: 852; 155; 130 TABLET ORAL at 21:33

## 2018-09-23 RX ADMIN — VITAMIN D, TAB 1000IU (100/BT) 1000 UNITS: 25 TAB at 10:16

## 2018-09-23 RX ADMIN — TOPIRAMATE 25 MG: 25 TABLET, FILM COATED ORAL at 21:33

## 2018-09-23 NOTE — PROGRESS NOTES
Progress Note - Melinda Bond 1985, 28 y o  female MRN: [id number]    Unit/Bed#: 755-74 Encounter: [id number]    Primary Care Provider: Porter Goldberg MD   Date and time admitted to hospital: 9/21/2018  5:02 PM        * Hypokalemia   Assessment & Plan    - Potassium at 2 0 upon arrival at ER  - She reports feeling fatigued with Paresthesia in extremities and face, now improving slowly  - She has been having multiple episodes of low potassium levels despite being on daily potassium supplement  - EKG shows sinus rhythm with premature atrial complexes at a rate of 74 beats per minute  - continue Telemetry monitoring   - Will stop IV potassium supplementation, continue PO potassium supplementation    - Patient started on spironolactone and midodrine per nephrology   [facility] for daily BMP monitoring  Vitamin D deficiency   Assessment & Plan    -Continue vitamin D3        Migraine without aura and without status migrainosus, not intractable   Assessment & Plan    - No reports of current headaches  - Continue home medications        History of gastric bypass   Assessment & Plan    -Reports being followed by weight management   -Monitor electrolytes  -Continue multivitamins  - Likely contributing to malabsorption of potassium  Hypocalcemiaresolved as of 9/23/2018   Assessment & Plan    ionized calcium is normal               VTE Pharmacologic Prophylaxis:   Pharmacologic: low risk no pharmacologic prophylaxis indicated  Mechanical VTE Prophylaxis in Place: Yes        Time Spent for Care: 20 minutes  More than 50% of total time spent on counseling and coordination of care as described above  Current Length of Stay: 2 day(s)    Current Patient Status: Inpatient   Certification Statement: The patient will continue to require additional inpatient hospital stay due to need for close monitoring on telemetry and labwork      Discharge Plan / Estimated Discharge Date: TBD, possibly home tomorrow  Code Status: Level 1 - Full Code      Subjective:   Patient feeling well today, paresthesias and weakness continue to slowly improve  Objective:   Vitals:   Temp (24hrs), Av 5 °F (36 9 °C), Min:98 3 °F (36 8 °C), Max:98 8 °F (37 1 °C)    HR:  [75-82] 82  Resp:  [18] 18  BP: (106-112)/(51-55) 109/51  SpO2:  [97 %-100 %] 100 %  Body mass index is 29 56 kg/m²  Input and Output Summary (last 24 hours): Intake/Output Summary (Last 24 hours) at 18 0949  Last data filed at 18 0858   Gross per 24 hour   Intake             2100 ml   Output              650 ml   Net             1450 ml       Physical Exam:   Physical Exam   Constitutional: She is oriented to person, place, and time  She appears well-developed and well-nourished  No distress  HENT:   Head: Normocephalic and atraumatic  Right Ear: External ear normal    Mouth/Throat: Oropharynx is clear and moist    Neck: No JVD present  Cardiovascular: Normal rate and normal heart sounds  No murmur heard  Pulmonary/Chest: Effort normal  No respiratory distress  She has no wheezes  Abdominal: Soft  Bowel sounds are normal    Musculoskeletal: She exhibits no edema  Neurological: She is alert and oriented to person, place, and time  Skin: Skin is warm and dry  She is not diaphoretic  Multiple tattoos  Psychiatric: She has a normal mood and affect  Nursing note and vitals reviewed          Additional Data:   Labs:    Results from last 7 days  Lab Units 18  0409 18  1740   WBC Thousand/uL 5 69 7 19   HEMOGLOBIN g/dL 11 8 13 4   HEMATOCRIT % 33 8* 38 4   PLATELETS Thousands/uL 280 301   NEUTROS PCT %  --  46   LYMPHS PCT %  --  42   MONOS PCT %  --  9   EOS PCT %  --  2       Results from last 7 days  Lab Units 18  2355  18  0409   SODIUM mmol/L 141  < > 141   POTASSIUM mmol/L 3 2*  < > 2 9*   CHLORIDE mmol/L 110*  < > 108   CO2 mmol/L 18*  < > 21   BUN mg/dL 12  < > 14   CREATININE mg/dL 0 89  < > 0 88   CALCIUM mg/dL 7 7*  < > 7 9*   ALK PHOS U/L  --   --  46   ALT U/L  --   --  22   AST U/L  --   --  19   < > = values in this interval not displayed  Results from last 7 days  Lab Units 09/22/18  0410   INR  1 04       * I Have Reviewed All Lab Data Listed Above  * Additional Pertinent Lab Tests Reviewed: All Labs Within Last 24 Hours Reviewed    Imaging:  Imaging Reports Reviewed Today Include: no new imaging  Recent Cultures (last 7 days):         Last 24 Hours Medication List:     Current Facility-Administered Medications:  acetaminophen 650 mg Oral Q6H PRN Ajit Moore PA-C   amitriptyline 50 mg Oral HS Ajit Moore PA-C   butalbital-acetaminophen-caffeine 1 tablet Oral Q4H PRN Ajit Moore PA-C   calcium carbonate-vitamin D 1 tablet Oral BID With Meals Ajit Moore PA-C   cholecalciferol 1,000 Units Oral Daily Ajit Moore PA-C   cyanocobalamin 500 mcg Oral Daily Ajit Moore PA-C   meclizine 25 mg Oral HS Ajit Moore PA-C   midodrine 2 5 mg Oral TID AC Juan Carlos Ricketts, DO   multivitamin-minerals 1 tablet Oral Daily Ajit Moore PA-C   ondansetron 4 mg Intravenous Q6H PRN Ajit Moore PA-C   potassium chloride 40 mEq Oral TID Ajit Moore PA-C   potassium-sodium phosphateS 1 tablet Oral 4x Daily (with meals and at bedtime) Ajit Moore PA-C   spironolactone 25 mg Oral Daily Juan Carlos Varvarisaías, DO   topiramate 25 mg Oral HS Ajit Moore PA-C        Today, Patient Was Seen By: Chely Bass PA-C    ** Please Note: Dragon 360 Dictation voice to text software may have been used in the creation of this document   **

## 2018-09-23 NOTE — PROGRESS NOTES
Progress Note - Nephrology   Raenelle Boeck 28 y o  female MRN: [id number]  Unit/Bed#: 717-02 Encounter: [id number]    A/P:  1  Hypokalemia               Patient's potassium significantly improved with additional supplementation  As mentioned history present illness, the patient recently had her potassium increased from 80 mEq PO to 120 mEq PO by our office  Despite this the patient's potassium continue to decrease slowly  Patient was also switched from tablets to liquid potassium in order to better absorbed the supplement  Patient was doing well in the outpatient setting, however, for an unknown reason this has now worsened and the patient is once again not able to absorb appropriately  Patient's urine potassium was checked in the setting of a hypokalemic serum and the urine potassium was noted to be appropriately low  Of note a transtubular potassium gradient is no longer recommended as a appropriate equation for tubular potassium absorption  Of note, the patient did have another aldosterone level checked which I believe is appropriate and we shall follow this up and comes back in a couple days  9/23:  Patient's potassium improved this morning on the spironolactone as well as regular supplementation of potassium on top of the IV fluids  Will discontinue IV fluids this morning which include 40 mEq per L potassium chloride  Continue with other supplementations which is in accordance with her outpatient regimen  Continue with the spironolactone and check a urine potassium this morning  Of note the patient's serum potassium from September 22nd around 12:30 p m  was 12 4 millimole/L  This was in the setting of a decreased serum sodium  In addition this was also most likely in the setting of a decreased urine creatinine  I did not check a urine creatinine but I did put the order in for this morning to check that specific urine sample    We will also check a urine sample this morning looking at the urine potassium as well as creatinine  The patient may have progression a serum threshold with respect to potassium wasting that was not picked up prior  In addition a less likely but also other on disorder must be entertained as the potential for renal tubular acidosis given the patient's decreased bicarbonate  We do have an arterial blood gas which shows the patient was in respiratory alkalosis  But nonetheless given the patient's cryptic causes for this low potassium I believe all potential etiology should be entertained  2  Hypophosphatemia               continue with phosphorus supplementation  Of note decreased phosphorus is unusual any renal tubular acidosis in is almost exclusively in occluded and Fanconi syndrome, which is a proximal tubular disorder  There can be Potassium disorders in Fanconi syndrome, will check urine for amino acids to rule out this disorder  3  Respiratory alkalosis               Patient noted to be in respiratory alkalosis at presentation the ABG, but as mentioned above will entertain the potential for a renal tubular disorder given the patient's cryptic presentations which have been ongoing  4  Paresthesias -improved with electrolyte improvement  5   Vitamin-D deficiency continue with vitamin-D supplementation       Follow up reason for today's visit:  Hypokalemia/hypophosphatemia     Hypokalemia    Patient Active Problem List   Diagnosis    Hypokalemia    History of gastric bypass    Migraine without aura and without status migrainosus, not intractable    Left-sided weakness    Hypocalcemia    Hypophosphatemia    Anemia    Vitamin D deficiency    Weakness of both lower extremities    Weakness of both upper extremities    Elevated CPK    Vitamin B12 deficiency    Cervical lymphadenopathy    Generalized weakness    Paresthesia of both lower extremities    Paresthesia         Subjective:   Paresthesias significantly improved, no nausea vomiting diarrhea, tolerating all p  o  supplementation appropriately  Objective:     Vitals: Blood pressure 109/51, pulse 82, temperature 98 3 °F (36 8 °C), temperature source Temporal, resp  rate 18, height 5' 2" (1 575 m), weight 73 3 kg (161 lb 9 6 oz), SpO2 100 %  ,Body mass index is 29 56 kg/m²  Weight (last 2 days)     Date/Time   Weight    09/23/18 0539  73 3 (161 6)    09/22/18 0540  67 4 (148 6)    09/21/18 1940  66 2 (145 94)    09/21/18 1658  66 1 (145 7)                Intake/Output Summary (Last 24 hours) at 09/23/18 0922  Last data filed at 09/23/18 0858   Gross per 24 hour   Intake             2100 ml   Output              650 ml   Net             1450 ml     I/O last 3 completed shifts: In: 2857 5 [P O :960; I V :1897 5]  Out: 450 [Urine:450]         Physical Exam: /51 (BP Location: Left arm)   Pulse 82   Temp 98 3 °F (36 8 °C) (Temporal)   Resp 18   Ht 5' 2" (1 575 m)   Wt 73 3 kg (161 lb 9 6 oz)   SpO2 100%   BMI 29 56 kg/m²     General Appearance:    Alert, cooperative, no distress, appears stated age   Head:    Normocephalic, without obvious abnormality, atraumatic   Eyes:    Conjunctiva/corneas clear   Ears:    Normal external ears   Nose:   Nares normal, septum midline, mucosa normal, no drainage    or sinus tenderness   Throat:   Lips, mucosa, and tongue normal; teeth and gums normal   Neck:   Supple   Back:     Symmetric, no curvature, ROM normal, no CVA tenderness   Lungs:     Clear to auscultation bilaterally, respirations unlabored   Chest wall:    No tenderness or deformity   Heart:    Regular rate and rhythm, S1 and S2 normal, no murmur, rub   or gallop   Abdomen:     Soft, non-tender, bowel sounds active   Extremities:   Extremities normal, atraumatic, no cyanosis or edema   Skin:   Skin color, texture, turgor normal, no rashes or lesions   Lymph nodes:   Cervical normal   Neurologic:   CNII-XII intact            Lab, Imaging and other studies: I have personally reviewed pertinent labs    CBC: No results found for: WBC, HGB, HCT, MCV, PLT, ADJUSTEDWBC, MCH, MCHC, RDW, MPV, NRBC  CMP: Lab Results   Component Value Date     09/22/2018    K 3 2 (L) 09/22/2018     (H) 09/22/2018    CO2 18 (L) 09/22/2018    BUN 12 09/22/2018    CREATININE 0 89 09/22/2018    CALCIUM 7 7 (L) 09/22/2018    EGFR 85 09/22/2018         Results from last 7 days  Lab Units 09/22/18  2355 09/22/18  1737 09/22/18  1301  09/22/18  0409  09/21/18  1740   SODIUM mmol/L 141 140 140  < > 141  < > 135*   POTASSIUM mmol/L 3 2* 2 7* 3 3*  < > 2 9*  < > 2 0*   CHLORIDE mmol/L 110* 108 109*  < > 108  < > 100   CO2 mmol/L 18* 19* 20*  < > 21  < > 25   BUN mg/dL 12 13 12  < > 14  < > 15   CREATININE mg/dL 0 89 0 95 0 95  < > 0 88  < > 1 02   CALCIUM mg/dL 7 7* 8 4 7 9*  < > 7 9*  < > 8 9   ALK PHOS U/L  --   --   --   --  46  --  52   ALT U/L  --   --   --   --  22  --  22   AST U/L  --   --   --   --  19  --  20   < > = values in this interval not displayed  Phosphorus: No results found for: PHOS  Magnesium: No results found for: MG  Urinalysis: No results found for: COLORU, CLARITYU, SPECGRAV, PHUR, LEUKOCYTESUR, NITRITE, PROTEINUA, GLUCOSEU, KETONESU, BILIRUBINUR, BLOODU  Ionized Calcium:   Lab Results   Component Value Date    CAION 1 13 09/22/2018     Coagulation: No results found for: PT, INR, APTT  Troponin: No results found for: TROPONINI  ABG: Lab Results   Component Value Date    PHART 7 459 (H) 09/22/2018    [id number] 26 1 (LL) 09/22/2018    PO2ART 115 1 09/22/2018    JPH7XZJ 18 1 (L) 09/22/2018    BEART -4 4 09/22/2018    SOURCE Radial, Left 09/22/2018     Radiology review:     IMAGING  No results found      Current Facility-Administered Medications   Medication Dose Route Frequency    acetaminophen (TYLENOL) tablet 650 mg  650 mg Oral Q6H PRN    amitriptyline (ELAVIL) tablet 50 mg  50 mg Oral HS    butalbital-acetaminophen-caffeine (FIORICET,ESGIC) -40 mg per tablet 1 tablet  1 tablet Oral Q4H PRN    calcium carbonate-vitamin D (OSCAL-D) 500 mg-200 units per tablet 1 tablet  1 tablet Oral BID With Meals    cholecalciferol (VITAMIN D3) tablet 1,000 Units  1,000 Units Oral Daily    cyanocobalamin (VITAMIN B-12) tablet 500 mcg  500 mcg Oral Daily    meclizine (ANTIVERT) tablet 25 mg  25 mg Oral HS    midodrine (PROAMATINE) tablet 2 5 mg  2 5 mg Oral TID AC    multivitamin-minerals (CENTRUM) tablet 1 tablet  1 tablet Oral Daily    ondansetron (ZOFRAN) injection 4 mg  4 mg Intravenous Q6H PRN    potassium chloride 10 % oral solution 40 mEq  40 mEq Oral TID    potassium-sodium phosphateS (K-PHOS,PHOSPHA 250) -250 mg tablet 1 tablet  1 tablet Oral 4x Daily (with meals and at bedtime)    spironolactone (ALDACTONE) tablet 25 mg  25 mg Oral Daily    topiramate (TOPAMAX) tablet 25 mg  25 mg Oral HS     Medications Discontinued During This Encounter   Medication Reason    topiramate (TOPAMAX) tablet 25 mg     sodium chloride 0 9 % with KCl 20 mEq/L infusion (premix)     sodium chloride 0 9 % with KCl 40 mEq/L infusion (premix)        Karolee Cheadle, DO

## 2018-09-23 NOTE — ASSESSMENT & PLAN NOTE
-Reports being followed by weight management   -Monitor electrolytes  -Continue multivitamins  - Likely contributing to malabsorption of potassium

## 2018-09-23 NOTE — ASSESSMENT & PLAN NOTE
- Potassium at 2 0 upon arrival at ER  - She reports feeling fatigued with Paresthesia in extremities and face, now improving slowly  - She has been having multiple episodes of low potassium levels despite being on daily potassium supplement  - EKG shows sinus rhythm with premature atrial complexes at a rate of 74 beats per minute  - continue Telemetry monitoring   - Will stop IV potassium supplementation, continue PO potassium supplementation    - Patient started on spironolactone and midodrine per nephrology  - Urine potassium and urine creatinine pending   - Ok for daily BMP monitoring

## 2018-09-23 NOTE — SOCIAL WORK
Case management met with the patient to assess the prior level of function and form a safe d/c plan  The patient was given and I reviewed the case management  discharge checklist with the patient  The patient is aware that the d/c planning starts on the day of admission and that if she  has any questions or needs they  is to contact case management   The patient is independent at home with all adls and she uses no dme no needs at this time

## 2018-09-23 NOTE — OCCUPATIONAL THERAPY NOTE
Occupational Therapy         Patient Name: Alissa Perez  [id number] Date: 9/23/2018      Order received and chart review performed; Pt is known to this department and performs at (I) level at baseline; per pr report, pt is performing at Independent level with self-care tasks as well as functional mobility with no device; spoke with pt on this matter, and pt feels she is (I) and does not require OT evaluation; pt does not require skilled OT needs at this time; will d/c OT orders at this time

## 2018-09-23 NOTE — PHYSICAL THERAPY NOTE
PT Note     Order received and chart review performed  Per nursing: Patient is performing at an independent level for all mobility tasks, as well as ambulation   Pt does not require PT services at this time and orders will be D/C

## 2018-09-24 VITALS
SYSTOLIC BLOOD PRESSURE: 111 MMHG | BODY MASS INDEX: 28.4 KG/M2 | WEIGHT: 154.32 LBS | RESPIRATION RATE: 18 BRPM | DIASTOLIC BLOOD PRESSURE: 53 MMHG | TEMPERATURE: 98.9 F | HEART RATE: 74 BPM | HEIGHT: 62 IN | OXYGEN SATURATION: 100 %

## 2018-09-24 LAB
ANION GAP SERPL CALCULATED.3IONS-SCNC: 10 MMOL/L (ref 4–13)
ATRIAL RATE: 74 BPM
BUN SERPL-MCNC: 10 MG/DL (ref 5–25)
CALCIUM SERPL-MCNC: 8.2 MG/DL (ref 8.3–10.1)
CHLORIDE SERPL-SCNC: 111 MMOL/L (ref 100–108)
CO2 SERPL-SCNC: 19 MMOL/L (ref 21–32)
CREAT SERPL-MCNC: 0.82 MG/DL (ref 0.6–1.3)
GFR SERPL CREATININE-BSD FRML MDRD: 94 ML/MIN/1.73SQ M
GLUCOSE SERPL-MCNC: 79 MG/DL (ref 65–140)
P AXIS: 47 DEGREES
POTASSIUM SERPL-SCNC: 3.4 MMOL/L (ref 3.5–5.3)
PR INTERVAL: 112 MS
QRS AXIS: 42 DEGREES
QRSD INTERVAL: 110 MS
QT INTERVAL: 356 MS
QTC INTERVAL: 395 MS
SODIUM SERPL-SCNC: 140 MMOL/L (ref 136–145)
T WAVE AXIS: 61 DEGREES
VENTRICULAR RATE: 74 BPM

## 2018-09-24 PROCEDURE — 80048 BASIC METABOLIC PNL TOTAL CA: CPT | Performed by: PHYSICIAN ASSISTANT

## 2018-09-24 PROCEDURE — 99238 HOSP IP/OBS DSCHRG MGMT 30/<: CPT | Performed by: PHYSICIAN ASSISTANT

## 2018-09-24 PROCEDURE — 93010 ELECTROCARDIOGRAM REPORT: CPT | Performed by: INTERNAL MEDICINE

## 2018-09-24 RX ORDER — SPIRONOLACTONE 25 MG/1
25 TABLET ORAL DAILY
Qty: 30 TABLET | Refills: 0 | Status: SHIPPED | OUTPATIENT
Start: 2018-09-25 | End: 2018-11-06 | Stop reason: HOSPADM

## 2018-09-24 RX ORDER — MIDODRINE HYDROCHLORIDE 2.5 MG/1
2.5 TABLET ORAL
Qty: 90 TABLET | Refills: 0 | Status: SHIPPED | OUTPATIENT
Start: 2018-09-24 | End: 2018-10-14

## 2018-09-24 RX ADMIN — SPIRONOLACTONE 25 MG: 25 TABLET, FILM COATED ORAL at 08:37

## 2018-09-24 RX ADMIN — CYANOCOBALAMIN TAB 500 MCG 500 MCG: 500 TAB at 08:38

## 2018-09-24 RX ADMIN — CALCIUM CARBONATE-VITAMIN D TAB 500 MG-200 UNIT 1 TABLET: 500-200 TAB at 08:37

## 2018-09-24 RX ADMIN — POTASSIUM CHLORIDE 40 MEQ: 20 SOLUTION ORAL at 08:38

## 2018-09-24 RX ADMIN — MULTIPLE VITAMINS W/ MINERALS TAB 1 TABLET: TAB at 08:38

## 2018-09-24 RX ADMIN — MIDODRINE HYDROCHLORIDE 2.5 MG: 5 TABLET ORAL at 06:11

## 2018-09-24 RX ADMIN — DIBASIC SODIUM PHOSPHATE, MONOBASIC POTASSIUM PHOSPHATE AND MONOBASIC SODIUM PHOSPHATE 1 TABLET: 852; 155; 130 TABLET ORAL at 08:37

## 2018-09-24 RX ADMIN — VITAMIN D, TAB 1000IU (100/BT) 1000 UNITS: 25 TAB at 08:38

## 2018-09-24 NOTE — NURSING NOTE
Pt walked off unit by RN, accompanied by spouse  Discharge instructions given to pt  Verbal understanding of same  Pt in no acute distress

## 2018-09-24 NOTE — PROGRESS NOTES
Progress Note - Nephrology   Jessee Marinelli 28 y o  female MRN: [id number]  Unit/Bed#: 433-85 Encounter: [id number]    A/P:  1  Hypokalemia               Patient's potassium significantly improved with additional supplementation  As mentioned history present illness, the patient recently had her potassium increased from 80 mEq PO to 120 mEq PO by our office  Despite this the patient's potassium continue to decrease slowly  Patient was also switched from tablets to liquid potassium in order to better absorbed the supplement  Patient was doing well in the outpatient setting, however, for an unknown reason this has now worsened and the patient is once again not able to absorb appropriately  Patient's urine potassium was checked in the setting of a hypokalemic serum and the urine potassium was noted to be appropriately low  Of note a transtubular potassium gradient is no longer recommended as a appropriate equation for tubular potassium absorption  Of note, the patient did have another aldosterone level checked which I believe is appropriate and we shall follow this up and comes back in a couple days  9/23:  Patient's potassium improved this morning on the spironolactone as well as regular supplementation of potassium on top of the IV fluids  Will discontinue IV fluids this morning which include 40 mEq per L potassium chloride  Continue with other supplementations which is in accordance with her outpatient regimen  Continue with the spironolactone and check a urine potassium this morning  Of note the patient's serum potassium from September 22nd around 12:30 p m  was 12 4 millimole/L  This was in the setting of a decreased serum sodium  In addition this was also most likely in the setting of a decreased urine creatinine  I did not check a urine creatinine but I did put the order in for this morning to check that specific urine sample    We will also check a urine sample this morning looking at the urine potassium as well as creatinine  The patient may have progression a serum threshold with respect to potassium wasting that was not picked up prior  In addition a less likely but also other on disorder must be entertained as the potential for renal tubular acidosis given the patient's decreased bicarbonate  We do have an arterial blood gas which shows the patient was in respiratory alkalosis  But nonetheless given the patient's cryptic causes for this low potassium I believe all potential etiology should be entertained  9/24:  Patient's potassium is improving with spironolactone and no additional supplementation on top of her typical 120 mEq orally throughout the day  The patient's urine potassium was normalized with the urine creatinine, ratios or low, however, should have been closer to undetectable despite the concentrated urine  Her as mentioned above, they remain fact be a mild amount of urinary loss the which the spironolactone could be improving upon  Patient's amino acids in the urine are also currently being checked to bring up the potential for Fanconi syndrome  Patient's bicarbonate also improving without bicarbonate supplementation  Once again as mentioned above, at this point he should potentially entertain any etiology as we continue to evaluate and treat this patient  2  Hypophosphatemia               continue with phosphorus supplementation  We will rule out Fanconi syndrome  3  Respiratory alkalosis               Patient noted to be in respiratory alkalosis at presentation the ABG, but as mentioned above will entertain the potential for a renal tubular disorder given the patient's cryptic presentations which have been ongoing  4  Paresthesias -improved with electrolyte improvement  5   Vitamin-D deficiency continue with vitamin-D supplementation       Follow up reason for today's visit:  Hypokalemia/hypophosphatemia     Hypokalemia    Patient Active Problem List   Diagnosis    Hypokalemia    History of gastric bypass    Migraine without aura and without status migrainosus, not intractable    Left-sided weakness    Hypophosphatemia    Anemia    Vitamin D deficiency    Weakness of both lower extremities    Weakness of both upper extremities    Elevated CPK    Vitamin B12 deficiency    Cervical lymphadenopathy    Generalized weakness    Paresthesia of both lower extremities    Paresthesia         Subjective:   No acute events overnight    Objective:     Vitals: Blood pressure 103/56, pulse 72, temperature 98 2 °F (36 8 °C), temperature source Temporal, resp  rate 17, height 5' 2" (1 575 m), weight 70 kg (154 lb 5 2 oz), SpO2 99 %  ,Body mass index is 28 23 kg/m²  Weight (last 2 days)     Date/Time   Weight    09/24/18 0535  70 (154 32)    09/23/18 0539  73 3 (161 6)    09/22/18 0540  67 4 (148 6)                Intake/Output Summary (Last 24 hours) at 09/24/18 0734  Last data filed at 09/23/18 2051   Gross per 24 hour   Intake              900 ml   Output              150 ml   Net              750 ml     I/O last 3 completed shifts: In: 2220 [P O :1260;  I V :960]  Out: 650 [Urine:650]         Physical Exam: /56 (BP Location: Left arm)   Pulse 72   Temp 98 2 °F (36 8 °C) (Temporal)   Resp 17   Ht 5' 2" (1 575 m)   Wt 70 kg (154 lb 5 2 oz)   SpO2 99%   BMI 28 23 kg/m²     General Appearance:    Alert, cooperative, no distress, appears stated age   Head:    Normocephalic, without obvious abnormality, atraumatic   Eyes:    Conjunctiva/corneas clear   Ears:    Normal external ears   Nose:   Nares normal, septum midline, mucosa normal, no drainage    or sinus tenderness   Throat:   Lips, mucosa, and tongue normal; teeth and gums normal   Neck:   Supple   Back:     Symmetric, no curvature, ROM normal, no CVA tenderness   Lungs:     Clear to auscultation bilaterally, respirations unlabored   Chest wall:    No tenderness or deformity   Heart:    Regular rate and rhythm, S1 and S2 normal, no murmur, rub   or gallop   Abdomen:     Soft, non-tender, bowel sounds active   Extremities:   Extremities normal, atraumatic, no cyanosis or edema   Skin:   Skin color, texture, turgor normal, no rashes or lesions   Lymph nodes:   Cervical normal   Neurologic:   CNII-XII intact            Lab, Imaging and other studies: I have personally reviewed pertinent labs  CBC: No results found for: WBC, HGB, HCT, MCV, PLT, ADJUSTEDWBC, MCH, MCHC, RDW, MPV, NRBC  CMP:   Lab Results   Component Value Date     09/24/2018    K 3 4 (L) 09/24/2018     (H) 09/24/2018    CO2 19 (L) 09/24/2018    BUN 10 09/24/2018    CREATININE 0 82 09/24/2018    CALCIUM 8 2 (L) 09/24/2018    EGFR 94 09/24/2018         Results from last 7 days  Lab Units 09/24/18  0503 09/22/18  2355 09/22/18  1737  09/22/18  0409  09/21/18  1740   SODIUM mmol/L 140 141 140  < > 141  < > 135*   POTASSIUM mmol/L 3 4* 3 2* 2 7*  < > 2 9*  < > 2 0*   CHLORIDE mmol/L 111* 110* 108  < > 108  < > 100   CO2 mmol/L 19* 18* 19*  < > 21  < > 25   BUN mg/dL 10 12 13  < > 14  < > 15   CREATININE mg/dL 0 82 0 89 0 95  < > 0 88  < > 1 02   CALCIUM mg/dL 8 2* 7 7* 8 4  < > 7 9*  < > 8 9   ALK PHOS U/L  --   --   --   --  46  --  52   ALT U/L  --   --   --   --  22  --  22   AST U/L  --   --   --   --  19  --  20   < > = values in this interval not displayed  Phosphorus: No results found for: PHOS  Magnesium: No results found for: MG  Urinalysis: No results found for: Berenice Javier, SPECGRAV, PHUR, LEUKOCYTESUR, NITRITE, PROTEINUA, GLUCOSEU, KETONESU, BILIRUBINUR, BLOODU  Ionized Calcium:   No results found for: CAION  Coagulation: No results found for: PT, INR, APTT  Troponin: No results found for: TROPONINI  ABG: No results found for: PHART, DYO3XCY, PO2ART, RTN1ORV, [id number], BEART, SOURCE  Radiology review:     IMAGING  No results found      Current Facility-Administered Medications   Medication Dose Route Frequency    acetaminophen (TYLENOL) tablet 650 mg  650 mg Oral Q6H PRN    amitriptyline (ELAVIL) tablet 50 mg  50 mg Oral HS    butalbital-acetaminophen-caffeine (FIORICET,ESGIC) -40 mg per tablet 1 tablet  1 tablet Oral Q4H PRN    calcium carbonate-vitamin D (OSCAL-D) 500 mg-200 units per tablet 1 tablet  1 tablet Oral BID With Meals    cholecalciferol (VITAMIN D3) tablet 1,000 Units  1,000 Units Oral Daily    cyanocobalamin (VITAMIN B-12) tablet 500 mcg  500 mcg Oral Daily    meclizine (ANTIVERT) tablet 25 mg  25 mg Oral HS    midodrine (PROAMATINE) tablet 2 5 mg  2 5 mg Oral TID AC    multivitamin-minerals (CENTRUM) tablet 1 tablet  1 tablet Oral Daily    ondansetron (ZOFRAN) injection 4 mg  4 mg Intravenous Q6H PRN    potassium chloride 10 % oral solution 40 mEq  40 mEq Oral TID    potassium-sodium phosphateS (K-PHOS,PHOSPHA 250) -250 mg tablet 1 tablet  1 tablet Oral 4x Daily (with meals and at bedtime)    spironolactone (ALDACTONE) tablet 25 mg  25 mg Oral Daily    topiramate (TOPAMAX) tablet 25 mg  25 mg Oral HS     Medications Discontinued During This Encounter   Medication Reason    topiramate (TOPAMAX) tablet 25 mg     sodium chloride 0 9 % with KCl 20 mEq/L infusion (premix)     sodium chloride 0 9 % with KCl 40 mEq/L infusion (premix)        Randi Urban DO

## 2018-09-25 LAB — ALDOST SERPL-MCNC: 4.2 NG/DL (ref 0–30)

## 2018-09-25 NOTE — ASSESSMENT & PLAN NOTE
The patient presented to the emergency department with reports of feeling fatigued with paresthesias in extremities and in face  In the ED the patients potassium was noted to be 2 0  EKG showed sinus rhythm with premature atrial complexes at a rate of 74 bpm  The patient was admitted to med/surg floor and monitored on telemetry  The patient has been having multiple episodes of low potassium levels despite being on daily potassium supplement  She was started on IV potassium supplementation and PO potassium supplementation  Nephrology was consulted and started the patient on spironolactone and midodrine per nephrology  The patient's potassium improved without additional supplementation  On the day of discharge the patient was noted to have a potassium of 3 4  She had improvement of her paresthesias  She was cleared by nephrology for discharge home  She was instructed to follow up with Nephrology  She was continued on spironolactone and midodrine on discharge  She was instructed to continue her potassium supplementation  She was given a script to repeat a BMP in 3 days  She was also instructed to follow up with her PCP within 1 week

## 2018-09-25 NOTE — ASSESSMENT & PLAN NOTE
-Reports being followed by weight management   -Continue multivitamins  - Likely contributing to malabsorption of potassium

## 2018-09-27 LAB
(HCYS)2/CREAT UR-RTO: <0.3 UMOL/G CR (ref 0–2)
A-AMINOBUTYR/CREAT UR-RTO: 6.3 UMOL/G CR (ref 0–34.6)
AAA/CREAT UR-RTO: 9.1 UMOL/G CR (ref 0–146.7)
ALANINE/CREAT UR-RTO: 564 UMOL/G CR (ref 0–1337)
ALLOISOLEUCINE/CREAT UR-RTO: 1.1 UMOL/G CR (ref 0–13.5)
AMINO ACID PAT SERPL-IMP: NORMAL
AMINO ACID, QN URINE: NORMAL
ARGININE/CREAT UR-RTO: 15.1 UMOL/G CR (ref 0–69.6)
ARGININOSUCCINATE/CREAT UR-RTO: 5.6 UMOL/G CR (ref 0–51.2)
ASPARAGINE/CREAT UR-RTO: 87.4 UMOL/G CR (ref 0–454.2)
ASPARTATE/CREAT UR-RTO: 6.1 UMOL/G CR (ref 0–86.7)
B-AIB/CREAT UR-RTO: 36.3 UMOL/G CR (ref 0–807.9)
B-ALANINE/CREAT UR-RTO: 2.2 UMOL/G CR (ref 0–869.8)
CITRULLINE/CREAT UR-RTO: 1.1 UMOL/G CR (ref 0–27.4)
CYSTATHIONIN/CREAT UR-RTO: 5.6 UMOL/G CR (ref 0–80.8)
CYSTINE/CREAT UR-RTO: 21 UMOL/G CR (ref 0–223.8)
GABA/CREAT UR-RTO: 1.2 UMOL/G CR (ref 0–13.1)
GLUTAMATE/CREAT UR-RTO: 11.3 UMOL/G CR (ref 0–92.4)
GLUTAMINE/CREAT UR-RTO: 342.3 UMOL/G CR (ref 0–1756.2)
GLYCINE/CREAT UR-RTO: 748.8 UMOL/G CR (ref 0–7996.9)
HISTIDINE/CREAT UR-RTO: 487.3 UMOL/G CR (ref 0–2534.2)
HOMOCITRULLINE/CREAT UR-RTO: 14.2 UMOL/G CR (ref 0–80)
ISOLEUCINE/CREAT UR-RTO: 9.2 UMOL/G CR (ref 0–48.1)
LAB DIRECTOR NAME PROVIDER: NORMAL
LEUCINE/CREAT UR-RTO: 16.3 UMOL/G CR (ref 0–129.1)
LYSINE/CREAT UR-RTO: 36.2 UMOL/G CR (ref 0–1020.6)
METHIONINE/CREAT UR-RTO: 6.1 UMOL/G CR (ref 0–37.1)
OH-LYSINE/CREAT UR-RTO: 2.5 UMOL/G CR (ref 0–37.3)
OH-PROLINE/CREAT UR-RTO: 1.6 UMOL/G CR (ref 0–87.9)
ORNITHINE/CREAT UR-RTO: 8 UMOL/G CR (ref 0–76.3)
PHE/CREAT UR-RTO: 22.9 UMOL/G CR (ref 0–239)
PROLINE/CREAT UR-RTO: 4.3 UMOL/G CR (ref 0–168.6)
REF LAB TEST METHOD: NORMAL
SARCOSINE/CREAT UR-RTO: 1.6 UMOL/G CR (ref 0–27.3)
SERINE/CREAT UR-RTO: 386.7 UMOL/G CR (ref 0–1052.8)
TAURINE/CREAT UR-RTO: 105.7 UMOL/G CR (ref 0–5335.7)
THREONINE/CREAT UR-RTO: 92.9 UMOL/G CR (ref 0–714.9)
TRYPTOPHAN/CREAT UR-RTO: 13.4 UMOL/G CR (ref 0–207.5)
TYROSINE/CREAT UR-RTO: 28.2 UMOL/G CR (ref 0–388.9)
VALINE/CREAT UR-RTO: 14.5 UMOL/G CR (ref 0–147.4)

## 2018-09-28 NOTE — CASE MANAGEMENT
Notification of Discharge  This is a Notification of Discharge from our facility [address]  Please be advised that this patient has been discharge from our facility  Below you will find the admission and discharge date and time including the patients disposition  PRESENTATION DATE: 9/21/2018  5:02 PM  IP ADMISSION DATE: 9/21/18 1844  DISCHARGE DATE: 9/24/2018 11:45 AM  DISPOSITION: Home/Self Care    [address] [facility] in the [facility] [place] Utilization Review Department  Phone: [phone]; Fax [phone]  ATTENTION: The Network Utilization Review Department is now centralized for our 9 Facilities  Make a note that we have a new phone and fax numbers for our Department  Please call with any questions or concerns to [phone] and carefully follow the prompts so that you are directed to the right person  All voicemails are confidential  Fax any determinations, approvals, denials, and requests for initial or continue stay review clinical to [phone]  Due to HIGH CALL volume, it would be easier if you could please send faxed requests to expedite your requests and in part, help us provide discharge notifications faster

## 2018-10-14 ENCOUNTER — APPOINTMENT (OUTPATIENT)
Dept: RADIOLOGY | Facility: HOSPITAL | Age: 33
End: 2018-10-14
Payer: COMMERCIAL

## 2018-10-14 ENCOUNTER — HOSPITAL ENCOUNTER (OUTPATIENT)
Facility: HOSPITAL | Age: 33
Setting detail: OBSERVATION
Discharge: HOME | End: 2018-10-16
Attending: FAMILY MEDICINE | Admitting: FAMILY MEDICINE
Payer: COMMERCIAL

## 2018-10-14 DIAGNOSIS — E87.6 HYPOKALEMIA: Primary | ICD-10-CM

## 2018-10-14 DIAGNOSIS — R20.2 PARESTHESIAS: ICD-10-CM

## 2018-10-14 DIAGNOSIS — R07.89 CHEST TIGHTNESS OR PRESSURE: ICD-10-CM

## 2018-10-14 LAB
ANION GAP SERPL CALCULATED.3IONS-SCNC: 10 MMOL/L (ref 4–13)
ANION GAP SERPL CALCULATED.3IONS-SCNC: 14 MMOL/L (ref 4–13)
BASOPHILS # BLD AUTO: 0.05 THOUSANDS/ΜL (ref 0–0.1)
BASOPHILS NFR BLD AUTO: 1 % (ref 0–1)
BUN SERPL-MCNC: 15 MG/DL (ref 5–25)
BUN SERPL-MCNC: 15 MG/DL (ref 5–25)
CALCIUM SERPL-MCNC: 7.8 MG/DL (ref 8.3–10.1)
CALCIUM SERPL-MCNC: 8.3 MG/DL (ref 8.3–10.1)
CHLORIDE SERPL-SCNC: 105 MMOL/L (ref 100–108)
CHLORIDE SERPL-SCNC: 111 MMOL/L (ref 100–108)
CO2 SERPL-SCNC: 16 MMOL/L (ref 21–32)
CO2 SERPL-SCNC: 22 MMOL/L (ref 21–32)
CREAT SERPL-MCNC: 0.91 MG/DL (ref 0.6–1.3)
CREAT SERPL-MCNC: 1.13 MG/DL (ref 0.6–1.3)
EOSINOPHIL # BLD AUTO: 0.1 THOUSAND/ΜL (ref 0–0.61)
EOSINOPHIL NFR BLD AUTO: 2 % (ref 0–6)
ERYTHROCYTE [DISTWIDTH] IN BLOOD BY AUTOMATED COUNT: 14.5 % (ref 11.6–15.1)
GFR SERPL CREATININE-BSD FRML MDRD: 64 ML/MIN/1.73SQ M
GFR SERPL CREATININE-BSD FRML MDRD: 83 ML/MIN/1.73SQ M
GLUCOSE SERPL-MCNC: 74 MG/DL (ref 65–140)
GLUCOSE SERPL-MCNC: 75 MG/DL (ref 65–140)
HCT VFR BLD AUTO: 41.6 % (ref 34.8–46.1)
HGB BLD-MCNC: 13.8 G/DL (ref 11.5–15.4)
IMM GRANULOCYTES # BLD AUTO: 0.02 THOUSAND/UL (ref 0–0.2)
IMM GRANULOCYTES NFR BLD AUTO: 0 % (ref 0–2)
LYMPHOCYTES # BLD AUTO: 1.86 THOUSANDS/ΜL (ref 0.6–4.47)
LYMPHOCYTES NFR BLD AUTO: 28 % (ref 14–44)
MAGNESIUM SERPL-MCNC: 2.2 MG/DL (ref 1.6–2.6)
MCH RBC QN AUTO: 29.8 PG (ref 26.8–34.3)
MCHC RBC AUTO-ENTMCNC: 33.2 G/DL (ref 31.4–37.4)
MCV RBC AUTO: 90 FL (ref 82–98)
MONOCYTES # BLD AUTO: 0.47 THOUSAND/ΜL (ref 0.17–1.22)
MONOCYTES NFR BLD AUTO: 7 % (ref 4–12)
NEUTROPHILS # BLD AUTO: 4.25 THOUSANDS/ΜL (ref 1.85–7.62)
NEUTS SEG NFR BLD AUTO: 62 % (ref 43–75)
NRBC BLD AUTO-RTO: 0 /100 WBCS
PHOSPHATE SERPL-MCNC: 3.3 MG/DL (ref 2.7–4.5)
PLATELET # BLD AUTO: 260 THOUSANDS/UL (ref 149–390)
PLATELET # BLD AUTO: 320 THOUSANDS/UL (ref 149–390)
PMV BLD AUTO: 10.2 FL (ref 8.9–12.7)
PMV BLD AUTO: 10.4 FL (ref 8.9–12.7)
POTASSIUM SERPL-SCNC: 2.6 MMOL/L (ref 3.5–5.3)
POTASSIUM SERPL-SCNC: 2.9 MMOL/L (ref 3.5–5.3)
RBC # BLD AUTO: 4.63 MILLION/UL (ref 3.81–5.12)
SODIUM SERPL-SCNC: 137 MMOL/L (ref 136–145)
SODIUM SERPL-SCNC: 141 MMOL/L (ref 136–145)
TROPONIN I SERPL-MCNC: <0.02 NG/ML
WBC # BLD AUTO: 6.75 THOUSAND/UL (ref 4.31–10.16)

## 2018-10-14 PROCEDURE — 99285 EMERGENCY DEPT VISIT HI MDM: CPT

## 2018-10-14 PROCEDURE — 99219 PR INITIAL OBSERVATION CARE/DAY 50 MINUTES: CPT | Performed by: FAMILY MEDICINE

## 2018-10-14 PROCEDURE — 84133 ASSAY OF URINE POTASSIUM: CPT | Performed by: FAMILY MEDICINE

## 2018-10-14 PROCEDURE — 83735 ASSAY OF MAGNESIUM: CPT | Performed by: FAMILY MEDICINE

## 2018-10-14 PROCEDURE — 96374 THER/PROPH/DIAG INJ IV PUSH: CPT

## 2018-10-14 PROCEDURE — 80048 BASIC METABOLIC PNL TOTAL CA: CPT | Performed by: FAMILY MEDICINE

## 2018-10-14 PROCEDURE — 84100 ASSAY OF PHOSPHORUS: CPT | Performed by: FAMILY MEDICINE

## 2018-10-14 PROCEDURE — 93005 ELECTROCARDIOGRAM TRACING: CPT

## 2018-10-14 PROCEDURE — 84484 ASSAY OF TROPONIN QUANT: CPT | Performed by: FAMILY MEDICINE

## 2018-10-14 PROCEDURE — 85049 AUTOMATED PLATELET COUNT: CPT | Performed by: FAMILY MEDICINE

## 2018-10-14 PROCEDURE — 71045 X-RAY EXAM CHEST 1 VIEW: CPT

## 2018-10-14 PROCEDURE — 36415 COLL VENOUS BLD VENIPUNCTURE: CPT | Performed by: FAMILY MEDICINE

## 2018-10-14 PROCEDURE — 85025 COMPLETE CBC W/AUTO DIFF WBC: CPT | Performed by: FAMILY MEDICINE

## 2018-10-14 RX ORDER — POTASSIUM CHLORIDE 20MEQ/15ML
80 LIQUID (ML) ORAL 2 TIMES DAILY
Status: DISCONTINUED | OUTPATIENT
Start: 2018-10-14 | End: 2018-10-14

## 2018-10-14 RX ORDER — POTASSIUM CHLORIDE 20MEQ/15ML
40 LIQUID (ML) ORAL 4 TIMES DAILY
Status: DISCONTINUED | OUTPATIENT
Start: 2018-10-14 | End: 2018-10-15

## 2018-10-14 RX ORDER — SODIUM CHLORIDE AND POTASSIUM CHLORIDE .9; .15 G/100ML; G/100ML
100 SOLUTION INTRAVENOUS CONTINUOUS
Status: DISCONTINUED | OUTPATIENT
Start: 2018-10-14 | End: 2018-10-15

## 2018-10-14 RX ORDER — THIAMINE MONONITRATE (VIT B1) 100 MG
100 TABLET ORAL DAILY
Status: DISCONTINUED | OUTPATIENT
Start: 2018-10-14 | End: 2018-10-16 | Stop reason: HOSPADM

## 2018-10-14 RX ORDER — ERGOCALCIFEROL (VITAMIN D2) 1250 MCG
50000 CAPSULE ORAL WEEKLY
COMMUNITY
End: 2020-03-24 | Stop reason: SDUPTHER

## 2018-10-14 RX ORDER — ZINC GLUCONATE 50 MG
50 TABLET ORAL DAILY
Status: ON HOLD | COMMUNITY
End: 2019-09-30

## 2018-10-14 RX ORDER — AMITRIPTYLINE HYDROCHLORIDE 50 MG/1
50 TABLET, FILM COATED ORAL
Status: DISCONTINUED | OUTPATIENT
Start: 2018-10-14 | End: 2018-10-16 | Stop reason: HOSPADM

## 2018-10-14 RX ORDER — ZINC GLUCONATE 50 MG
50 TABLET ORAL DAILY
Status: DISCONTINUED | OUTPATIENT
Start: 2018-10-14 | End: 2018-10-14

## 2018-10-14 RX ORDER — POTASSIUM CHLORIDE 20 MEQ/1
40 TABLET, EXTENDED RELEASE ORAL ONCE
Status: COMPLETED | OUTPATIENT
Start: 2018-10-14 | End: 2018-10-14

## 2018-10-14 RX ORDER — LANOLIN ALCOHOL/MO/W.PET/CERES
100 CREAM (GRAM) TOPICAL DAILY
Status: ON HOLD | COMMUNITY
End: 2020-09-16

## 2018-10-14 RX ORDER — POTASSIUM CHLORIDE 20 MEQ/1
20 TABLET, EXTENDED RELEASE ORAL ONCE
Status: COMPLETED | OUTPATIENT
Start: 2018-10-14 | End: 2018-10-14

## 2018-10-14 RX ORDER — FERROUS SULFATE 325(65) MG
325 TABLET ORAL EVERY OTHER DAY
COMMUNITY

## 2018-10-14 RX ORDER — CHOLECALCIFEROL (VITAMIN D3) 10 MCG
1 TABLET ORAL
Status: DISCONTINUED | OUTPATIENT
Start: 2018-10-14 | End: 2018-10-16 | Stop reason: HOSPADM

## 2018-10-14 RX ORDER — FOLIC ACID/VIT B COMPLEX AND C 0.8 MG
0.8 TABLET ORAL
Status: ON HOLD | COMMUNITY
End: 2020-09-16

## 2018-10-14 RX ORDER — SPIRONOLACTONE 25 MG/1
25 TABLET ORAL DAILY
Status: DISCONTINUED | OUTPATIENT
Start: 2018-10-14 | End: 2018-10-16 | Stop reason: HOSPADM

## 2018-10-14 RX ORDER — POTASSIUM CHLORIDE 14.9 MG/ML
40 INJECTION INTRAVENOUS ONCE
Status: DISCONTINUED | OUTPATIENT
Start: 2018-10-14 | End: 2018-10-16 | Stop reason: HOSPADM

## 2018-10-14 RX ADMIN — AMITRIPTYLINE HYDROCHLORIDE 50 MG: 50 TABLET, FILM COATED ORAL at 21:59

## 2018-10-14 RX ADMIN — SODIUM CHLORIDE AND POTASSIUM CHLORIDE 100 ML/HR: .9; .15 SOLUTION INTRAVENOUS at 17:45

## 2018-10-14 RX ADMIN — POTASSIUM CHLORIDE 20 MEQ: 1500 TABLET, EXTENDED RELEASE ORAL at 13:54

## 2018-10-14 RX ADMIN — POTASSIUM CHLORIDE 40 MEQ: 20 SOLUTION ORAL at 21:58

## 2018-10-14 RX ADMIN — POTASSIUM CHLORIDE 40 MEQ: 1500 TABLET, EXTENDED RELEASE ORAL at 12:45

## 2018-10-14 RX ADMIN — POTASSIUM CHLORIDE 40 MEQ: 20 SOLUTION ORAL at 18:00

## 2018-10-14 NOTE — H&P
H&P Exam - Yenifer Tobin 28 y o  female MRN: [id number]    Unit/Bed#: RM10 Encounter: [id number]    History of gastric bypass   Assessment & Plan    I believe this is the likely culprit for recurrent hypokalemia     Hypokalemia   Assessment & Plan    Per review of previous hospitalization the patient responded better to potassium elixir  Check a random urine K  Initiate KCL 80meq PO BID Elixir   NS with 20meq KCL x 100cc/hr  Aldactone  Repeat BMP @ 2200  Check troponin x 2            History of Present Illness      The patient is a pleasant 79-year-old female with history of gastric bypass 4 years ago with recurrent visits to the ER returns today with a chief complaint of paresthesias  Patient was found have a potassium of 2 5  She received 20 mEq of potassium chloride IV, given p o  Potassium tablets  She currently states she feels much better  She had some chest pressure earlier that has since resolved  The patient has been dealing with this problem since June of this year  When she contacted her surgeon that performed the gastric bypass he stated that her recurrent hypokalemia is not due to her gastric bypass  She has had an extensive workup with Endocrinology including a 24 hour urine potassium, stool for potassium  Her  is present at bedside and states she is taking upwards of 220 mEq of potassium chloride tablets at home  Review of Systems   Respiratory: Positive for chest tightness  Negative for cough, shortness of breath and stridor  Cardiovascular: Negative for chest pain, palpitations and leg swelling  Neurological: Positive for numbness  Parasethesia     All other systems reviewed and are negative        Historical Information   Past Medical History:   Diagnosis Date    Hypokalemia     Migraine      Past Surgical History:   Procedure Laterality Date    ABDOMINAL SURGERY      APPENDECTOMY      CHOLECYSTECTOMY      GASTRIC BYPASS      HYSTERECTOMY       Social History History   Alcohol Use    Yes     Comment: socially     History   Drug Use No     History   Smoking Status    Never Smoker   Smokeless Tobacco    Never Used     Family History: non-contributory    Meds/Allergies   all medications and allergies reviewed  Allergies   Allergen Reactions    Nsaids      Other reaction(s): Other (Please comment)  Should not take s/p bariatric surgery       Objective   First Vitals:   Blood Pressure: 130/70 (10/14/18 1137)  Pulse: 94 (10/14/18 1137)  Temperature: (!) 97 3 °F (36 3 °C) (10/14/18 1137)  Temp Source: Temporal (10/14/18 1137)  Respirations: 17 (10/14/18 1137)  Weight - Scale: 64 5 kg (142 lb 3 2 oz) (10/14/18 1137)  SpO2: 100 % (10/14/18 1137)    Current Vitals:   Blood Pressure: 118/79 (10/14/18 1430)  Pulse: 91 (10/14/18 1430)  Temperature: (!) 97 3 °F (36 3 °C) (10/14/18 1137)  Temp Source: Temporal (10/14/18 1137)  Respirations: 18 (10/14/18 1430)  Weight - Scale: 64 5 kg (142 lb 3 2 oz) (10/14/18 1137)  SpO2: 100 % (10/14/18 1430)    No intake or output data in the 24 hours ending 10/14/18 1452    Invasive Devices     Peripheral Intravenous Line            Peripheral IV 10/14/18 Left Antecubital less than 1 day                Physical Exam   Constitutional: She is oriented to person, place, and time  She appears well-developed  HENT:   Head: Normocephalic and atraumatic  Mouth/Throat: No oropharyngeal exudate  Eyes: Pupils are equal, round, and reactive to light  No scleral icterus  Neck: Neck supple  No JVD present  No tracheal deviation present  Cardiovascular: Normal rate and regular rhythm  No murmur heard  Pulmonary/Chest: Effort normal and breath sounds normal  No respiratory distress  She has no wheezes  She has no rales  Abdominal: Soft  Bowel sounds are normal  She exhibits no distension  There is no tenderness  There is no rebound  Musculoskeletal: Normal range of motion  She exhibits no edema     Neurological: She is alert and oriented to person, place, and time  No cranial nerve deficit  Coordination normal    Skin: Skin is warm and dry  She is not diaphoretic  No erythema  Psychiatric: She has a normal mood and affect         Lab Results:   Lab Results   Component Value Date    WBC 6 75 10/14/2018    HGB 13 8 10/14/2018    HCT 41 6 10/14/2018    MCV 90 10/14/2018     10/14/2018     Lab Results   Component Value Date    CALCIUM 8 3 10/14/2018     10/14/2018    K 2 6 (LL) 10/14/2018    CO2 22 10/14/2018     10/14/2018    BUN 15 10/14/2018    CREATININE 1 13 10/14/2018       Imaging:   XR chest 1 view portable    (Results Pending)   NAD      EKG, Pathology, and Other Studies: NSR     Code Status: Prior  Advance Directive and Living Will:      Power of :    POLST:      Counseling / Coordination of Care:

## 2018-10-14 NOTE — ASSESSMENT & PLAN NOTE
This is a chronic and recurrent problem with multiple admissions  Per review of previous hospitalization the patient responded better to potassium elixir  Potassium level improved  Continue Aldactone for its potassium-retaining properties  Increase KCl elixir to 60 meq TID upon discharge  Patient will be set up with outpatient IV potassium infusions by GI  F/u with GI, Nephrology and PCP

## 2018-10-14 NOTE — ASSESSMENT & PLAN NOTE
With resultant short gut syndrome causing recurrent hypokalemia  GI consulted and appreciate input - patient will be set up for outpatient potassium IV infusions  Continue vitamin/iron replacement as previously

## 2018-10-14 NOTE — ED PROVIDER NOTES
This 59-year-old female status post gastric bypass has been having issue with retaining her potassium presented to ED with complain of chest heaviness and low potassium  Patient states the she has been on high dose of potassium and her PCP has been checking her level every 2 days  Patient also complained of numbness and tingling in her feet and arms  Patient states her last lab shows her potassium is 3 0  She states the she has been having chest heaviness since Friday which prompted this ED visit  Patient states it does not radiate anywhere it is in the center of her chest denies any shortness of breath with that  An rating the heaviness around 5/5  Patient does complain of some dizziness  She states the she does have a follow-up with her nephrologist   History  Chief Complaint   Patient presents with    Tingling     States having tingling in feet and hands for a few days  tingling/pain shooting up left arm into chest started on Friday  History provided by:  Patient   used: No        Prior to Admission Medications   Prescriptions Last Dose Informant Patient Reported? Taking?    POTASSIUM CHLORIDE ER PO   Yes Yes   Sig: Take 60 mEq by mouth daily with lunch   amitriptyline (ELAVIL) 50 mg tablet   Yes Yes   Sig: Take 50 mg by mouth daily at bedtime   b complex-C-folic acid (NEPHRO-EDIE) 0 8 mg tablet   Yes Yes   Sig: Take 0 8 mg by mouth daily with dinner   ergocalciferol (ERGOCALCIFEROL) 23455 units capsule   Yes Yes   Sig: Take 50,000 Units by mouth once a week   ferrous sulfate 325 (65 Fe) mg tablet   Yes Yes   Sig: Take 325 mg by mouth 2 (two) times a day with meals   meclizine (ANTIVERT) 25 mg tablet   Yes Yes   Sig: Take 25 mg by mouth daily at bedtime   potassium chloride 10 %   Yes No   Sig: Take 80 mEq by mouth 2 (two) times a day     spironolactone (ALDACTONE) 25 mg tablet   No Yes   Sig: Take 1 tablet (25 mg total) by mouth daily   thiamine 100 MG tablet   Yes Yes   Sig: Take 100 mg by mouth daily   zinc gluconate 50 mg tablet   Yes Yes   Sig: Take 50 mg by mouth daily      Facility-Administered Medications: None       Past Medical History:   Diagnosis Date    Hypokalemia     Migraine        Past Surgical History:   Procedure Laterality Date    ABDOMINAL SURGERY      APPENDECTOMY      CHOLECYSTECTOMY      GASTRIC BYPASS      HYSTERECTOMY         Family History   Problem Relation Age of Onset    Hypertension Father     Diabetes Father     Diabetes Maternal Grandfather      I have reviewed and agree with the history as documented  Social History   Substance Use Topics    Smoking status: Never Smoker    Smokeless tobacco: Never Used    Alcohol use Yes      Comment: socially        Review of Systems   Constitutional: Negative  HENT: Negative  Eyes: Negative  Respiratory: Negative  Cardiovascular: Positive for chest pain (Heaviness)  Negative for palpitations  Gastrointestinal: Negative  Negative for diarrhea and nausea  Genitourinary: Negative  Musculoskeletal: Negative  Neurological: Positive for dizziness  Physical Exam  Physical Exam   Constitutional: She is oriented to person, place, and time  She appears well-developed and well-nourished  HENT:   Nose: Nose normal    Mouth/Throat: Oropharynx is clear and moist  No oropharyngeal exudate  Eyes: Pupils are equal, round, and reactive to light  Conjunctivae and EOM are normal  No scleral icterus  Neck: Normal range of motion  Neck supple  No JVD present  No tracheal deviation present  Cardiovascular: Normal rate, regular rhythm and normal heart sounds  No murmur heard  Pulmonary/Chest: Effort normal and breath sounds normal  No respiratory distress  She has no wheezes  She has no rales  Abdominal: Soft  Bowel sounds are normal  There is no tenderness  There is no guarding  Musculoskeletal: Normal range of motion  She exhibits no edema or tenderness     Neurological: She is alert and oriented to person, place, and time  No cranial nerve deficit or sensory deficit  She exhibits normal muscle tone  5/5 motor, nl sens   Skin: Skin is warm and dry  Psychiatric: She has a normal mood and affect  Her behavior is normal    Nursing note and vitals reviewed        Vital Signs  ED Triage Vitals   Temperature Pulse Respirations Blood Pressure SpO2   10/14/18 1137 10/14/18 1137 10/14/18 1137 10/14/18 1137 10/14/18 1137   (!) 97 3 °F (36 3 °C) 94 17 130/70 100 %      Temp Source Heart Rate Source Patient Position - Orthostatic VS BP Location FiO2 (%)   10/14/18 1137 10/14/18 1137 10/14/18 1300 10/14/18 1137 --   Temporal Monitor Lying Right arm       Pain Score       10/14/18 1137       No Pain           Vitals:    10/14/18 1137 10/14/18 1300 10/14/18 1330   BP: 130/70 129/60 123/62   Pulse: 94 83 88   Patient Position - Orthostatic VS:  Lying Lying       Visual Acuity      ED Medications  Medications   potassium chloride 20 mEq IVPB (premix) (40 mEq Intravenous Restarted 10/14/18 1347)   potassium chloride (K-DUR,KLOR-CON) CR tablet 40 mEq (40 mEq Oral Given 10/14/18 1245)   potassium chloride (K-DUR,KLOR-CON) CR tablet 20 mEq (20 mEq Oral Given 10/14/18 1354)       Diagnostic Studies  Results Reviewed     Procedure Component Value Units Date/Time    Basic metabolic panel [[id number]]  (Abnormal) Collected:  10/14/18 1210    Lab Status:  Final result Specimen:  Blood from Arm, Left Updated:  10/14/18 1241     Sodium 137 mmol/L      Potassium 2 6 (LL) mmol/L      Chloride 105 mmol/L      CO2 22 mmol/L      ANION GAP 10 mmol/L      BUN 15 mg/dL      Creatinine 1 13 mg/dL      Glucose 74 mg/dL      Calcium 8 3 mg/dL      eGFR 64 ml/min/1 73sq m     Narrative:         National Kidney Disease Education Program recommendations are as follows:  GFR calculation is accurate only with a steady state creatinine  Chronic Kidney disease less than 60 ml/min/1 73 sq  meters  Kidney failure less than 15 ml/min/1 73 sq  meters  Troponin I [[id number]]  (Normal) Collected:  10/14/18 1210    Lab Status:  Final result Specimen:  Blood from Arm, Left Updated:  10/14/18 1241     Troponin I <0 02 ng/mL     Magnesium [[id number]]  (Normal) Collected:  10/14/18 1210    Lab Status:  Final result Specimen:  Blood from Arm, Left Updated:  10/14/18 1234     Magnesium 2 2 mg/dL     Phosphorus [[id number]]  (Normal) Collected:  10/14/18 1210    Lab Status:  Final result Specimen:  Blood from Arm, Left Updated:  10/14/18 1234     Phosphorus 3 3 mg/dL     CBC and differential [[id number]] Collected:  10/14/18 1210    Lab Status:  Final result Specimen:  Blood from Arm, Left Updated:  10/14/18 1225     WBC 6 75 Thousand/uL      RBC 4 63 Million/uL      Hemoglobin 13 8 g/dL      Hematocrit 41 6 %      MCV 90 fL      MCH 29 8 pg      MCHC 33 2 g/dL      RDW 14 5 %      MPV 10 2 fL      Platelets 665 Thousands/uL      nRBC 0 /100 WBCs      Neutrophils Relative 62 %      Immat GRANS % 0 %      Lymphocytes Relative 28 %      Monocytes Relative 7 %      Eosinophils Relative 2 %      Basophils Relative 1 %      Neutrophils Absolute 4 25 Thousands/µL      Immature Grans Absolute 0 02 Thousand/uL      Lymphocytes Absolute 1 86 Thousands/µL      Monocytes Absolute 0 47 Thousand/µL      Eosinophils Absolute 0 10 Thousand/µL      Basophils Absolute 0 05 Thousands/µL                  XR chest 1 view portable    (Results Pending)              Procedures  Procedures       Phone Contacts  ED Phone Contact    ED Course  ED Course as of Oct 14 1356   Sun Oct 14, 2018   1356 Result of discussed with the patient patient states she is feeling better after the potassium infusion  He denies any chest pain or chest pressure this time  Will admit                                  MDM  CritCare Time    Disposition  Final diagnoses:   Hypokalemia   Chest tightness or pressure   Paresthesias     Time reflects when diagnosis was documented in both MDM as applicable and the Disposition within this note     Time User Action Codes Description Comment    10/14/2018  1:55 PM Geraldene Lota Add [E87 6] Hypokalemia     10/14/2018  1:55 PM Geraldene Lota Add [R07 9] Chest pain     10/14/2018  1:55 PM Geraldene Lota Remove [R07 9] Chest pain     10/14/2018  1:55 PM Eleuterio Rehman [R07 89] Chest tightness or pressure     10/14/2018  1:55 PM Geraldene Lota Add [R20 2] Paresthesias       ED Disposition     ED Disposition Condition Comment    Admit  Case was discussed with Dr Brenden Enciso and the patient's admission status was agreed to be Admission Status: observation status to the service of Dr Brenden Enciso   Follow-up Information    None         Patient's Medications   Discharge Prescriptions    No medications on file     No discharge procedures on file      ED Provider  Electronically Signed by           Ann Painter MD  10/14/18 6213

## 2018-10-15 LAB
ALBUMIN SERPL BCP-MCNC: 3 G/DL (ref 3.5–5)
ALP SERPL-CCNC: 37 U/L (ref 46–116)
ALT SERPL W P-5'-P-CCNC: 29 U/L (ref 12–78)
ANION GAP SERPL CALCULATED.3IONS-SCNC: 12 MMOL/L (ref 4–13)
ANION GAP SERPL CALCULATED.3IONS-SCNC: 8 MMOL/L (ref 4–13)
AST SERPL W P-5'-P-CCNC: 21 U/L (ref 5–45)
ATRIAL RATE: 85 BPM
BASOPHILS # BLD AUTO: 0.04 THOUSANDS/ΜL (ref 0–0.1)
BASOPHILS NFR BLD AUTO: 1 % (ref 0–1)
BILIRUB SERPL-MCNC: 0.2 MG/DL (ref 0.2–1)
BUN SERPL-MCNC: 12 MG/DL (ref 5–25)
BUN SERPL-MCNC: 13 MG/DL (ref 5–25)
CALCIUM SERPL-MCNC: 7.6 MG/DL (ref 8.3–10.1)
CALCIUM SERPL-MCNC: 7.7 MG/DL (ref 8.3–10.1)
CHLORIDE SERPL-SCNC: 113 MMOL/L (ref 100–108)
CHLORIDE SERPL-SCNC: 114 MMOL/L (ref 100–108)
CO2 SERPL-SCNC: 17 MMOL/L (ref 21–32)
CO2 SERPL-SCNC: 18 MMOL/L (ref 21–32)
CREAT SERPL-MCNC: 0.84 MG/DL (ref 0.6–1.3)
CREAT SERPL-MCNC: 0.96 MG/DL (ref 0.6–1.3)
EOSINOPHIL # BLD AUTO: 0.16 THOUSAND/ΜL (ref 0–0.61)
EOSINOPHIL NFR BLD AUTO: 3 % (ref 0–6)
ERYTHROCYTE [DISTWIDTH] IN BLOOD BY AUTOMATED COUNT: 14.6 % (ref 11.6–15.1)
GFR SERPL CREATININE-BSD FRML MDRD: 77 ML/MIN/1.73SQ M
GFR SERPL CREATININE-BSD FRML MDRD: 91 ML/MIN/1.73SQ M
GLUCOSE SERPL-MCNC: 82 MG/DL (ref 65–140)
GLUCOSE SERPL-MCNC: 93 MG/DL (ref 65–140)
HCT VFR BLD AUTO: 36.2 % (ref 34.8–46.1)
HGB BLD-MCNC: 11.5 G/DL (ref 11.5–15.4)
IMM GRANULOCYTES # BLD AUTO: 0.02 THOUSAND/UL (ref 0–0.2)
IMM GRANULOCYTES NFR BLD AUTO: 0 % (ref 0–2)
LYMPHOCYTES # BLD AUTO: 2.32 THOUSANDS/ΜL (ref 0.6–4.47)
LYMPHOCYTES NFR BLD AUTO: 40 % (ref 14–44)
MAGNESIUM SERPL-MCNC: 2.1 MG/DL (ref 1.6–2.6)
MCH RBC QN AUTO: 29.6 PG (ref 26.8–34.3)
MCHC RBC AUTO-ENTMCNC: 31.8 G/DL (ref 31.4–37.4)
MCV RBC AUTO: 93 FL (ref 82–98)
MONOCYTES # BLD AUTO: 0.5 THOUSAND/ΜL (ref 0.17–1.22)
MONOCYTES NFR BLD AUTO: 9 % (ref 4–12)
NEUTROPHILS # BLD AUTO: 2.83 THOUSANDS/ΜL (ref 1.85–7.62)
NEUTS SEG NFR BLD AUTO: 47 % (ref 43–75)
NRBC BLD AUTO-RTO: 0 /100 WBCS
P AXIS: 53 DEGREES
PHOSPHATE SERPL-MCNC: 2.6 MG/DL (ref 2.7–4.5)
PLATELET # BLD AUTO: 271 THOUSANDS/UL (ref 149–390)
PMV BLD AUTO: 10.8 FL (ref 8.9–12.7)
POTASSIUM SERPL-SCNC: 3.6 MMOL/L (ref 3.5–5.3)
POTASSIUM SERPL-SCNC: 3.9 MMOL/L (ref 3.5–5.3)
POTASSIUM UR-SCNC: 14.2 MMOL/L (ref 1–300)
PR INTERVAL: 120 MS
PROT SERPL-MCNC: 6 G/DL (ref 6.4–8.2)
QRS AXIS: 22 DEGREES
QRSD INTERVAL: 102 MS
QT INTERVAL: 384 MS
QTC INTERVAL: 456 MS
RBC # BLD AUTO: 3.88 MILLION/UL (ref 3.81–5.12)
SODIUM SERPL-SCNC: 139 MMOL/L (ref 136–145)
SODIUM SERPL-SCNC: 143 MMOL/L (ref 136–145)
T WAVE AXIS: 49 DEGREES
VENTRICULAR RATE: 85 BPM
WBC # BLD AUTO: 5.87 THOUSAND/UL (ref 4.31–10.16)

## 2018-10-15 PROCEDURE — 83735 ASSAY OF MAGNESIUM: CPT | Performed by: FAMILY MEDICINE

## 2018-10-15 PROCEDURE — 80053 COMPREHEN METABOLIC PANEL: CPT | Performed by: FAMILY MEDICINE

## 2018-10-15 PROCEDURE — 80048 BASIC METABOLIC PNL TOTAL CA: CPT | Performed by: FAMILY MEDICINE

## 2018-10-15 PROCEDURE — 93010 ELECTROCARDIOGRAM REPORT: CPT | Performed by: INTERNAL MEDICINE

## 2018-10-15 PROCEDURE — 85025 COMPLETE CBC W/AUTO DIFF WBC: CPT | Performed by: FAMILY MEDICINE

## 2018-10-15 PROCEDURE — 99225 PR SBSQ OBSERVATION CARE/DAY 25 MINUTES: CPT | Performed by: FAMILY MEDICINE

## 2018-10-15 PROCEDURE — 84100 ASSAY OF PHOSPHORUS: CPT | Performed by: FAMILY MEDICINE

## 2018-10-15 RX ORDER — POTASSIUM CHLORIDE 20MEQ/15ML
60 LIQUID (ML) ORAL 3 TIMES DAILY
Status: DISCONTINUED | OUTPATIENT
Start: 2018-10-15 | End: 2018-10-16 | Stop reason: HOSPADM

## 2018-10-15 RX ORDER — SODIUM CHLORIDE AND POTASSIUM CHLORIDE .9; .15 G/100ML; G/100ML
100 SOLUTION INTRAVENOUS ONCE
Status: DISCONTINUED | OUTPATIENT
Start: 2018-10-15 | End: 2018-10-16 | Stop reason: HOSPADM

## 2018-10-15 RX ADMIN — SPIRONOLACTONE 25 MG: 25 TABLET, FILM COATED ORAL at 09:12

## 2018-10-15 RX ADMIN — POTASSIUM CHLORIDE 60 MEQ: 20 SOLUTION ORAL at 09:13

## 2018-10-15 RX ADMIN — DIBASIC SODIUM PHOSPHATE, MONOBASIC POTASSIUM PHOSPHATE AND MONOBASIC SODIUM PHOSPHATE 1 TABLET: 852; 155; 130 TABLET ORAL at 18:43

## 2018-10-15 RX ADMIN — AMITRIPTYLINE HYDROCHLORIDE 50 MG: 50 TABLET, FILM COATED ORAL at 22:15

## 2018-10-15 RX ADMIN — DIBASIC SODIUM PHOSPHATE, MONOBASIC POTASSIUM PHOSPHATE AND MONOBASIC SODIUM PHOSPHATE 1 TABLET: 852; 155; 130 TABLET ORAL at 09:12

## 2018-10-15 RX ADMIN — DIBASIC SODIUM PHOSPHATE, MONOBASIC POTASSIUM PHOSPHATE AND MONOBASIC SODIUM PHOSPHATE 1 TABLET: 852; 155; 130 TABLET ORAL at 22:11

## 2018-10-15 RX ADMIN — POTASSIUM CHLORIDE 60 MEQ: 20 SOLUTION ORAL at 18:43

## 2018-10-15 RX ADMIN — DIBASIC SODIUM PHOSPHATE, MONOBASIC POTASSIUM PHOSPHATE AND MONOBASIC SODIUM PHOSPHATE 1 TABLET: 852; 155; 130 TABLET ORAL at 11:41

## 2018-10-15 RX ADMIN — POTASSIUM CHLORIDE 60 MEQ: 20 SOLUTION ORAL at 22:11

## 2018-10-15 RX ADMIN — SODIUM CHLORIDE AND POTASSIUM CHLORIDE 100 ML/HR: .9; .15 SOLUTION INTRAVENOUS at 03:15

## 2018-10-15 RX ADMIN — Medication 100 MG: at 09:12

## 2018-10-15 RX ADMIN — Medication 1 CAPSULE: at 18:44

## 2018-10-15 NOTE — PROGRESS NOTES
Progress Note - Dannielle Anderson 1985, 28 y o  female MRN: [id number]    Unit/Bed#: 406-01 Encounter: [id number]    Primary Care Provider: Laura Benavides MD   Date and time admitted to hospital: 10/14/2018 11:35 AM        History of gastric bypass   Assessment & Plan    Weight loss surgeon binta rodriguez has since retired  She wishes to establish with [address] [place] [address] [facility] for weight loss     * Hypokalemia   Assessment & Plan    Resolved  Will continue with potassium chloride at p o  Via elixir  1 additional liter of NS with 20meq kcl  I have discussed her case with Dr Shady Ferrer, he will see the patient in consult on 10/16/18 in order to establish an outpatient infusion regimen to prevent ER visits and readmissions          Progress Note - Dannielle Anderson 28 y o  female MRN: [id number]    Unit/Bed#: 406-01 Encounter: [id number]        Subjective:   Patient seen and examined at bedside  Has no acute complaints today  paraesthesia has resolved    Objective:     Vitals:   Vitals:    10/15/18 0804   BP: 112/55   Pulse: 92   Resp: 18   Temp: 97 9 °F (36 6 °C)   SpO2: 98%     Body mass index is 27 06 kg/m²      Intake/Output Summary (Last 24 hours) at 10/15/18 0935  Last data filed at 10/15/18 0313   Gross per 24 hour   Intake          1466 67 ml   Output              150 ml   Net          1316 67 ml       Physical Exam:   /55 (BP Location: Right arm)   Pulse 92   Temp 97 9 °F (36 6 °C) (Temporal)   Resp 18   Ht 5' 2" (1 575 m)   Wt 67 1 kg (147 lb 14 9 oz)   SpO2 98%   BMI 27 06 kg/m²   General appearance: alert and oriented, in no acute distress  Head: Normocephalic, without obvious abnormality, atraumatic  Lungs: clear to auscultation bilaterally  Heart: regular rate and rhythm, S1, S2 normal, no murmur, click, rub or gallop  Abdomen: soft, non-tender; bowel sounds normal; no masses,  no organomegaly  Extremities: extremities normal, warm and well-perfused; no cyanosis, clubbing, or edema  Pulses: 2+ and symmetric  Neurologic: Grossly normal     Invasive Devices     Peripheral Intravenous Line            Peripheral IV 10/14/18 Left Antecubital less than 1 day                  Results from last 7 days  Lab Units 10/15/18  0438 10/14/18  1850 10/14/18  1210   WBC Thousand/uL 5 87  --  6 75   HEMOGLOBIN g/dL 11 5  --  13 8   HEMATOCRIT % 36 2  --  41 6   PLATELETS Thousands/uL 271 260 320         Results from last 7 days  Lab Units 10/15/18  0438 10/14/18  2127 10/14/18  1210   SODIUM mmol/L 143 141 137   POTASSIUM mmol/L 3 6 2 9* 2 6*   CHLORIDE mmol/L 114* 111* 105   CO2 mmol/L 17* 16* 22   BUN mg/dL 12 15 15   CREATININE mg/dL 0 84 0 91 1 13   CALCIUM mg/dL 7 7* 7 8* 8 3   ALK PHOS U/L 37*  --   --    ALT U/L 29  --   --    AST U/L 21  --   --        Medication Administration - last 24 hours from 10/14/2018 0935 to 10/15/2018 0935       Date/Time Order Dose Route Action Action by     10/14/2018 1245 potassium chloride (K-DUR,KLOR-CON) CR tablet 40 mEq 40 mEq Oral Given Edgar, RN     10/14/2018 1347 potassium chloride 20 mEq IVPB (premix) 40 mEq Intravenous Restarted Edgar, RN     10/14/2018 1354 potassium chloride (K-DUR,KLOR-CON) CR tablet 20 mEq 20 mEq Oral Given Edgar, ELYSSA     10/15/2018 0912 spironolactone (ALDACTONE) tablet 25 mg 25 mg Oral Given Génessi Baez RN     10/14/2018 1609 spironolactone (ALDACTONE) tablet 25 mg 25 mg Oral Not Given Nicol Matisa, RN     10/14/2018 2159 amitriptyline (ELAVIL) tablet 50 mg 50 mg Oral Given Nicol Matias RN     10/14/2018 1612 b complex-vitamin C-folic acid (NEPHROCAPS) capsule 1 capsule 1 capsule Oral Not Given Nicol Matias RN     10/15/2018 0912 thiamine (VITAMIN B1) tablet 100 mg 100 mg Oral Given Génesis Baez, ELYSSA     10/14/2018 1609 thiamine (VITAMIN B1) tablet 100 mg 100 mg Oral Not Given Nicol Matias RN     10/14/2018 1610 zinc gluconate tablet 50 mg 50 mg Oral Not Given Nicol Matias RN 10/15/2018 0315 sodium chloride 0 9 % with KCl 20 mEq/L infusion (premix) 100 mL/hr Intravenous [address] Lolis Rhodes RN     10/15/2018 4090 sodium chloride 0 9 % with KCl 20 mEq/L infusion (premix) 0 mL/hr Intravenous Stopped Lolis Rhodes RN     10/14/2018 1745 sodium chloride 0 9 % with KCl 20 mEq/L infusion (premix) 100 mL/hr Intravenous [facility], ELYSSA     10/15/2018 0912 enoxaparin (LOVENOX) subcutaneous injection 40 mg 40 mg Subcutaneous Not Given Shon López RN     10/14/2018 1611 enoxaparin (LOVENOX) subcutaneous injection 40 mg 40 mg Subcutaneous Not Given Irma Botello RN     10/14/2018 2158 potassium chloride 10 % oral solution 40 mEq 40 mEq Oral Given Irma Botello RN     10/14/2018 1800 potassium chloride 10 % oral solution 40 mEq 40 mEq Oral Given Irma Botello RN     10/15/2018 0912 potassium-sodium phosphateS (K-PHOS,PHOSPHA 250) -250 mg tablet 1 tablet 1 tablet Oral Given Shon López RN     10/15/2018 0913 potassium chloride 10 % oral solution 60 mEq 60 mEq Oral Given Shon López RN            Lab, Imaging and other studies: I have personally reviewed pertinent reports      VTE Pharmacologic Prophylaxis: Enoxaparin (Lovenox)  VTE Mechanical Prophylaxis: sequential compression device     Nella Tiwari MD  10/15/2018,9:35 AM

## 2018-10-15 NOTE — ASSESSMENT & PLAN NOTE
Weight loss surgeon binta rodriguez has since retired  She wishes to establish with [facility] for weight loss

## 2018-10-15 NOTE — SOCIAL WORK
Met with pt to discuss role as  in helping pt to develop discharge plan and to help pt carry out their plan  Pt lives in a single level house with her    Pt has no steps on the outside  Pt has no DME  At home   Pt is independent with ADL's and ambulation  Pt does the cooking  Pt and his wife both drive  Pt has never had home care or any services in the home  Pt has never had home care or any services in the home  [facility]  [facility]  Pt is A high utilizer   Pt is agreeable to coming to the infusion center to get planned infusions of potassium  Pt is also agreeable to switching to a [facility] [place]  In the past she did have a hard time finding some one that accepted her insurance  Dr Elda Flowers does accept her insurance and the pt already see him so Dr Aureliano Fletcher is going to check and see if Dr Elda Flowers is willing to be her PCP

## 2018-10-15 NOTE — UTILIZATION REVIEW
Initial Clinical Review    Thank you,  [address] [facility] Review Department  Phone: [phone]; Fax [phone]  ATTENTION: Please call with any questions or concerns to [phone]  and carefully follow the prompts so that you are directed to the right person  Send all requests for admission clinical reviews, approved or denied determinations and any other requests to fax [phone]  All voicemails are confidential        Admission: Date/Time/Statement: 10/14/18 @ 1355 -- OBS    Orders Placed This Encounter   Procedures    Place in Observation (expected length of stay for this patient is less than two midnights)     Standing Status:   Standing     Number of Occurrences:   1     Order Specific Question:   Admitting Physician     Answer:   Shirley Velasquez     Order Specific Question:   Level of Care     Answer:   Med Surg [16]     Order Specific Question:   Bed request comments     Answer:   Telemetry       ED: Date/Time/Mode of Arrival:   ED Arrival Information     Expected Arrival [address] of Arrival Escorted By Service Admission Type    - 10/14/2018 11:31 Urgent Walk-In Family Member General Medicine Urgent    Arrival Complaint    hand and feet tingling          Chief Complaint:   Chief Complaint   Patient presents with    Tingling     States having tingling in feet and hands for a few days  tingling/pain shooting up left arm into chest started on Friday  History of Illness:  70-year-old female with history of gastric bypass 4 years ago with recurrent visits to the ER returns today with a chief complaint of paresthesias  Patient was found to have a potassium of 2 5  She received 20 mEq of potassium chloride IV, given p o  Potassium tablets  She currently states she feels much better  She had some chest pressure earlier that has since resolved  The patient has been dealing with this problem since June of this year    When she contacted her surgeon that performed the gastric bypass he stated that her recurrent hypokalemia is not due to her gastric bypass  She has had an extensive workup with Endocrinology including a 24 hour urine potassium, stool for potassium  Her  is present at bedside and states she is taking upwards of 220 mEq of potassium chloride tablets at home  ED Vital Signs:   ED Triage Vitals   Temperature Pulse Respirations Blood Pressure SpO2   10/14/18 1137 10/14/18 1137 10/14/18 1137 10/14/18 1137 10/14/18 1137   (!) 97 3 °F (36 3 °C) 94 17 130/70 100 %      Temp Source Heart Rate Source Patient Position - Orthostatic VS BP Location FiO2 (%)   10/14/18 1137 10/14/18 1137 10/14/18 1300 10/14/18 1137 --   Temporal Monitor Lying Right arm       Pain Score       10/14/18 1137       No Pain        Wt Readings from Last 1 Encounters:   10/14/18 67 1 kg (147 lb 14 9 oz)       Vital Signs (abnormal): WNL    Abnormal Labs/Diagnostic Test Results: K 2 6     Ref Range & Units 10/15/18 0438 10/14/18 2127 10/14/18 1210   Sodium 136 - 145 mmol/L 143  141  137    Potassium 3 5 - 5 3 mmol/L 3 6  2 9   2 6     Chloride 100 - 108 mmol/L 114   111   105    CO2 21 - 32 mmol/L 17   16   22    ANION GAP 4 - 13 mmol/L 12  14   10    BUN 5 - 25 mg/dL 12  15  15    Creatinine 0 60 - 1 30 mg/dL 0 84  0 91CM  1 13CM    Glucose 65 - 140 mg/dL 82  75CM  74CM    Calcium 8 3 - 10 1 mg/dL 7 7   7 8   8 3    AST 5 - 45 U/L 21      ALT 12 - 78 U/L 29      Alkaline Phosphatase 46 - 116 U/L 37       Total Protein 6 4 - 8 2 g/dL 6 0       Albumin 3 5 - 5 0 g/dL 3 0           ED Treatment:   Medication Administration from 10/14/2018 1131 to 10/14/2018 1548       Date/Time Order Dose Route Action     10/14/2018 1245 potassium chloride (K-DUR,KLOR-CON) CR tablet 40 mEq 40 mEq Oral Given     10/14/2018 1354 potassium chloride (K-DUR,KLOR-CON) CR tablet 20 mEq 20 mEq Oral Given       Past Medical/Surgical History:    Active Ambulatory Problems     Diagnosis Date Noted    Hypokalemia 07/07/2018    History of gastric bypass 07/07/2018    Migraine without aura and without status migrainosus, not intractable 07/07/2018    Left-sided weakness 07/07/2018    Hypophosphatemia 07/09/2018    Anemia 07/10/2018    Vitamin D deficiency 07/10/2018    Weakness of both lower extremities 07/11/2018    Weakness of both upper extremities 07/11/2018    Elevated CPK 07/11/2018    Vitamin B12 deficiency 07/11/2018    Cervical lymphadenopathy 07/11/2018    Generalized weakness 07/12/2018    Paresthesia of both lower extremities 08/15/2018    Paresthesia 08/15/2018     Past Medical History:   Diagnosis Date    Hypokalemia     Migraine        Admitting Diagnosis: Hypokalemia [E87 6]  Tingling [R20 2]  Paresthesias [R20 2]  Chest tightness or pressure [R07 89]    Age/Sex: 28 y o  female    Assessment/Plan:        History of gastric bypass   Assessment & Plan     I believe this is the likely culprit for recurrent hypokalemia      Hypokalemia   Assessment & Plan     Per review of previous hospitalization the patient responded better to potassium elixir  Check a random urine K  Initiate KCL 80meq PO BID Elixir   NS with 20meq KCL x 100cc/hr  Aldactone  Repeat BMP @ 2200  Check troponin x 2          Admission Orders:  M/S/Tele unit  Telem  SCD's  Regular diet  Consult GI  Repeat BMP in AM    Scheduled Meds:   Current Facility-Administered Medications:  amitriptyline 50 mg Oral HS Norma Lucio MD    b complex-vitamin C-folic acid 1 capsule Oral Daily With Óscar Maldonado MD    enoxaparin 40 mg Subcutaneous Daily Norma Lucio MD    potassium chloride 60 mEq Oral TID Norma Lucio MD    potassium chloride 40 mEq Intravenous Once Colton Ventura MD Last Rate: 40 mEq (10/14/18 1347)   potassium-sodium phosphateS 1 tablet Oral 4x Daily (with meals and at bedtime) Norma Lucio MD    sodium chloride 0 9 % with KCl 20 mEq/L 100 mL/hr Intravenous Continuous Khushi Garcai MD Last Rate: 100 mL/hr (10/15/18 0989)   spironolactone 25 mg Oral Daily Khushi Garcia MD    thiamine 100 mg Oral Daily Khushi Garcia MD      Continuous Infusions:   sodium chloride 0 9 % with KCl 20 mEq/L 100 mL/hr Last Rate: 100 mL/hr (10/15/18 9329)

## 2018-10-15 NOTE — ASSESSMENT & PLAN NOTE
Resolved  Will continue with potassium chloride at p o   Via elixir  1 additional liter of NS with 20meq kcl  I have discussed her case with Dr Dedra Degroot, he will see the patient in consult on 10/16/18 in order to establish an outpatient infusion regimen to prevent ER visits and readmissions

## 2018-10-16 VITALS
WEIGHT: 147.93 LBS | TEMPERATURE: 97.9 F | BODY MASS INDEX: 27.22 KG/M2 | DIASTOLIC BLOOD PRESSURE: 62 MMHG | RESPIRATION RATE: 15 BRPM | SYSTOLIC BLOOD PRESSURE: 110 MMHG | HEART RATE: 101 BPM | OXYGEN SATURATION: 100 % | HEIGHT: 62 IN

## 2018-10-16 DIAGNOSIS — Z98.84 H/O GASTRIC BYPASS: Primary | ICD-10-CM

## 2018-10-16 LAB
ANION GAP SERPL CALCULATED.3IONS-SCNC: 10 MMOL/L (ref 4–13)
BUN SERPL-MCNC: 8 MG/DL (ref 5–25)
CALCIUM SERPL-MCNC: 7.5 MG/DL (ref 8.3–10.1)
CHLORIDE SERPL-SCNC: 111 MMOL/L (ref 100–108)
CO2 SERPL-SCNC: 19 MMOL/L (ref 21–32)
CREAT SERPL-MCNC: 0.83 MG/DL (ref 0.6–1.3)
GFR SERPL CREATININE-BSD FRML MDRD: 92 ML/MIN/1.73SQ M
GLUCOSE P FAST SERPL-MCNC: 83 MG/DL (ref 65–99)
GLUCOSE SERPL-MCNC: 83 MG/DL (ref 65–140)
MAGNESIUM SERPL-MCNC: 1.9 MG/DL (ref 1.6–2.6)
PHOSPHATE SERPL-MCNC: 3 MG/DL (ref 2.7–4.5)
POTASSIUM SERPL-SCNC: 3.4 MMOL/L (ref 3.5–5.3)
SODIUM SERPL-SCNC: 140 MMOL/L (ref 136–145)

## 2018-10-16 PROCEDURE — 83735 ASSAY OF MAGNESIUM: CPT | Performed by: FAMILY MEDICINE

## 2018-10-16 PROCEDURE — 84100 ASSAY OF PHOSPHORUS: CPT | Performed by: FAMILY MEDICINE

## 2018-10-16 PROCEDURE — 99203 OFFICE O/P NEW LOW 30 MIN: CPT | Performed by: INTERNAL MEDICINE

## 2018-10-16 PROCEDURE — 80048 BASIC METABOLIC PNL TOTAL CA: CPT | Performed by: FAMILY MEDICINE

## 2018-10-16 PROCEDURE — 99217 PR OBSERVATION CARE DISCHARGE MANAGEMENT: CPT | Performed by: FAMILY MEDICINE

## 2018-10-16 RX ORDER — POTASSIUM CHLORIDE 14.9 MG/ML
20 INJECTION INTRAVENOUS ONCE
Status: COMPLETED | OUTPATIENT
Start: 2018-10-16 | End: 2018-10-16

## 2018-10-16 RX ORDER — POTASSIUM CHLORIDE 20MEQ/15ML
60 LIQUID (ML) ORAL 3 TIMES DAILY
Qty: 900 ML | Refills: 0 | Status: SHIPPED | OUTPATIENT
Start: 2018-10-16 | End: 2018-11-06 | Stop reason: HOSPADM

## 2018-10-16 RX ADMIN — Medication 1 CAPSULE: at 16:09

## 2018-10-16 RX ADMIN — Medication 100 MG: at 08:18

## 2018-10-16 RX ADMIN — DIBASIC SODIUM PHOSPHATE, MONOBASIC POTASSIUM PHOSPHATE AND MONOBASIC SODIUM PHOSPHATE 1 TABLET: 852; 155; 130 TABLET ORAL at 08:18

## 2018-10-16 RX ADMIN — POTASSIUM CHLORIDE 60 MEQ: 20 SOLUTION ORAL at 08:18

## 2018-10-16 RX ADMIN — DIBASIC SODIUM PHOSPHATE, MONOBASIC POTASSIUM PHOSPHATE AND MONOBASIC SODIUM PHOSPHATE 1 TABLET: 852; 155; 130 TABLET ORAL at 16:09

## 2018-10-16 RX ADMIN — SPIRONOLACTONE 25 MG: 25 TABLET, FILM COATED ORAL at 08:17

## 2018-10-16 RX ADMIN — DIBASIC SODIUM PHOSPHATE, MONOBASIC POTASSIUM PHOSPHATE AND MONOBASIC SODIUM PHOSPHATE 1 TABLET: 852; 155; 130 TABLET ORAL at 13:34

## 2018-10-16 RX ADMIN — POTASSIUM CHLORIDE 60 MEQ: 20 SOLUTION ORAL at 16:05

## 2018-10-16 RX ADMIN — POTASSIUM CHLORIDE 20 MEQ: 200 INJECTION, SOLUTION INTRAVENOUS at 13:33

## 2018-10-16 NOTE — SOCIAL WORK
Pt is being discharged today   Pt's family will give the pt a ride home  Pt would like to switch to all [employer]  Pt is agreeable to getting infusions at the infusion center as needed  Patient identified as HRR per criteria  Call made to Primary Care Physician for post hospital follow up appointment  Follow up appointment discussed and transportation arrangements verified with patient and/or caregiver  Appointment slip given to patient and/or caregiver  AVS reviewed for appointment documentation prior to discharge  Call [address] and physician will work on getting the pt set up the infusion center as needed  I will refer pt to the outpatient care coordinator  All follow up appointments reviewed with the patient with a good understanding

## 2018-10-16 NOTE — DISCHARGE SUMMARY
Discharge- Jasiwnder Michael 1985, 28 y o  female MRN: [id number]    Unit/Bed#: 406-01 Encounter: [id number]    Primary Care Provider: Vinay Aggarwal MD   Date and time admitted to hospital: 10/14/2018 11:35 AM        * Hypokalemia   Assessment & Plan    This is a chronic and recurrent problem with multiple admissions  Per review of previous hospitalization the patient responded better to potassium elixir  Potassium level improved  Continue Aldactone for its potassium-retaining properties  Increase KCl elixir to 60 meq TID upon discharge  Patient will be set up with outpatient IV potassium infusions by GI  F/u with GI, Nephrology and PCP     History of gastric bypass   Assessment & Plan    With resultant short gut syndrome causing recurrent hypokalemia  GI consulted and appreciate input - patient will be set up for outpatient potassium IV infusions  Continue vitamin/iron replacement as previously       Discharging Physician / Practitioner: Haris Campbell MD  PCP: Vinay Aggarwal MD  Admission Date:   Admission Orders     Ordered        10/14/18 1354  Place in Observation (expected length of stay for this patient is less than two midnights)  Once             Discharge Date: 10/16/18    Resolved Problems  Date Reviewed: 10/16/2018    None          Consultations During Hospital Stay:  · GI, Case management    Procedures Performed:     XR chest 1 view portable   Final Result by KIAH Ricardo MD (10/15 5486)      No acute cardiopulmonary disease  Workstation performed: [id number]             Significant Findings / Test Results:       Results from last 7 days  Lab Units 10/16/18  0457   SODIUM mmol/L 140   POTASSIUM mmol/L 3 4*   CHLORIDE mmol/L 111*   CO2 mmol/L 19*   BUN mg/dL 8   CREATININE mg/dL 0 83   CALCIUM mg/dL 7 5*     K 2 6-->2 9-->3 6-->3 9-->3 4    Incidental Findings:   · None    Test Results Pending at Discharge (will require follow up):    · None      Outpatient Tests Requested:  · Continue serial outpatient BMPs    Complications:  None    Reason for Admission: hypokalemia    Hospital Course: Rowe Dakin is a 28 y o  female patient who originally presented to the hospital on 10/14/2018 due to acute on chronic hypokalemia which was most likely related to short gut syndrome s/p bariatric surgery  She has had multiple admissions with this diagnosis in recent past      There were no events on telemetry and no noted cardiac abnormalities during patient's hospitalization  The patient received PO and IV potassium replacement with subsequent improvement of her potassium level  The patient was evaluated by GI and will be set up with outpatient potassium infusions  The patient is at high risk for readmission, however, we expect that after she is established with IV potassium supplementation her risk of readmission will be substantially decreased  Please see above list of diagnoses and related plan for additional information  Condition at Discharge: good     Discharge Day Visit / Exam:     Subjective:  Patient seen and examined  She reports feeling generally well  She states that her hand numbness and paresthesias nearly resolved  She denies chest pain, shortness of breath or palpitations  She denies lightheadedness or dizziness  She denies nausea, vomiting, diarrhea constipation  She states that she would like to go home  Vitals: Blood Pressure: 110/62 (10/16/18 0814)  Pulse: 101 (10/16/18 0814)  Temperature: 97 9 °F (36 6 °C) (10/16/18 0814)  Temp Source: Temporal (10/16/18 0814)  Respirations: 15 (10/16/18 0814)  Height: 5' 2" (157 5 cm) (10/14/18 1548)  Weight - Scale: 67 1 kg (147 lb 14 9 oz) (10/14/18 1548)  SpO2: 100 % (10/16/18 0814)    Exam:     Physical Exam   Constitutional: She is oriented to person, place, and time  No distress  HENT:   Head: Normocephalic and atraumatic  Eyes: Conjunctivae are normal    Neck: No JVD present     Cardiovascular: Normal rate and regular rhythm  No murmur heard  Pulmonary/Chest: Effort normal  No respiratory distress  She has no wheezes  She has no rales  Abdominal: Soft  She exhibits no distension  There is no tenderness  There is no guarding  Musculoskeletal: She exhibits no edema  Neurological: She is alert and oriented to person, place, and time  No cranial nerve deficit  She exhibits normal muscle tone  Coordination normal    Skin: Skin is warm and dry  Psychiatric: She has a normal mood and affect  Discussion with Family: Patient will update family herself    Discharge instructions/Information to patient and family:   See after visit summary for information provided to patient and family  Provisions for Follow-Up Care:  See after visit summary for information related to follow-up care and any pertinent home health orders  Disposition:     Home    For Discharges to [facility] SNF:   · Not Applicable to this Patient - Not Applicable to this Patient    Planned Readmission: No     Discharge Statement:  I spent 35 minutes discharging the patient  This time was spent on the day of discharge  I had direct contact with the patient on the day of discharge  Greater than 50% of the total time was spent examining patient, answering all patient questions, arranging and discussing plan of care with patient as well as directly providing post-discharge instructions  Additional time then spent on discharge activities  Discharge Medications:  See after visit summary for reconciled discharge medications provided to patient and family        ** Please Note: This note has been constructed using a voice recognition system **

## 2018-10-16 NOTE — CONSULTS
Consultation - [facility]  Ra Mcclure 28 y o  female MRN: [id number]  Unit/Bed#: 406-01 Encounter: [id number]        Consults    ASSESSMENT/ PLAN:    29 yo female with pmh migraines, gastric bypass who was admitted with recurrent hypokalemia     1  Hypokalemia: new onset in July, 2018 with multiple episodes since that time requiring hospitalization  She was taking 180mg potassium daily and her PCP increased this last week to 220  She was admitted 10/14 with level of 2 6  This could be secondary to gastric bypass but surgery was in 10/2014 and symptoms began in 7/2018   -outpatient referral to weight management  -outpatient potassium infusions   -outpatient GI follow up     Reason for Consult / Principal Problem: hypokalemia     HPI: Grecia Conway is a 29 yo female with pmh migraines, gastric bypass who was admitted with tingling and chest heaviness found to have hypokalemia  She presented on 10/14 with tingling in her toes, fingertips and chest heaviness  Her potassium was found to be 2 6  She has been taking 180mg potassium daily and this was increased to 220 on Thursday  GI was consulted to assist in outpatient potassium infusions  She has had EGD and colonoscopy in the past with Dr Lee Flores and states these were both normal  Extensive surgical history including gastric bypass, cholecystectomy, appendectomy, multiple laparoscopic surgeries with adhesion lysis, hysterectomy and tummy tuck  REVIEW OF SYSTEMS:     CONSTITUTIONAL: Denies any fever, chills, or rigors  Good appetite, and no recent weight loss  HEENT: No earache or tinnitus  Denies hearing loss or visual disturbances  CARDIOVASCULAR: No chest pain or palpitations  RESPIRATORY: Denies any cough, hemoptysis, shortness of breath or dyspnea on exertion  GASTROINTESTINAL: As noted in the History of Present Illness  GENITOURINARY: No problems with urination  Denies any hematuria or dysuria    NEUROLOGIC: No dizziness or vertigo, denies headaches  MUSCULOSKELETAL: Denies any muscle or joint pain  SKIN: Denies skin rashes or itching  ENDOCRINE: Denies excessive thirst  Denies intolerance to heat or cold  PSYCHOSOCIAL: Denies depression or anxiety  Denies any recent memory loss         Historical Information   Past Medical History:   Diagnosis Date    Hypokalemia     Migraine      Past Surgical History:   Procedure Laterality Date    ABDOMINAL SURGERY      APPENDECTOMY      CHOLECYSTECTOMY      GASTRIC BYPASS      HYSTERECTOMY       Social History   History   Alcohol Use No     Comment: socially     History   Drug Use No     History   Smoking Status    Never Smoker   Smokeless Tobacco    Never Used     Family History   Problem Relation Age of Onset    Hypertension Father     Diabetes Father     Diabetes Maternal Grandfather        Meds/Allergies     Prescriptions Prior to Admission   Medication    amitriptyline (ELAVIL) 50 mg tablet    b complex-C-folic acid (NEPHRO-EDIE) 0 8 mg tablet    ergocalciferol (ERGOCALCIFEROL) 83593 units capsule    ferrous sulfate 325 (65 Fe) mg tablet    meclizine (ANTIVERT) 25 mg tablet    POTASSIUM CHLORIDE ER PO    spironolactone (ALDACTONE) 25 mg tablet    thiamine 100 MG tablet    zinc gluconate 50 mg tablet    potassium chloride 10 %     Current Facility-Administered Medications   Medication Dose Route Frequency    amitriptyline (ELAVIL) tablet 50 mg  50 mg Oral HS    b complex-vitamin C-folic acid (NEPHROCAPS) capsule 1 capsule  1 capsule Oral Daily With Dinner    enoxaparin (LOVENOX) subcutaneous injection 40 mg  40 mg Subcutaneous Daily    potassium chloride 10 % oral solution 60 mEq  60 mEq Oral TID    potassium chloride 20 mEq IVPB (premix)  40 mEq Intravenous Once    potassium chloride 20 mEq IVPB (premix)  20 mEq Intravenous Once    potassium-sodium phosphateS (K-PHOS,PHOSPHA 250) -250 mg tablet 1 tablet  1 tablet Oral 4x Daily (with meals and at bedtime)    sodium chloride 0 9 % with KCl 20 mEq/L infusion (premix)  100 mL/hr Intravenous Once    spironolactone (ALDACTONE) tablet 25 mg  25 mg Oral Daily    thiamine (VITAMIN B1) tablet 100 mg  100 mg Oral Daily       Allergies   Allergen Reactions    Nsaids      Other reaction(s): Other (Please comment)  Should not take s/p bariatric surgery           Objective     Blood pressure 110/62, pulse 101, temperature 97 9 °F (36 6 °C), temperature source Temporal, resp  rate 15, height 5' 2" (1 575 m), weight 67 1 kg (147 lb 14 9 oz), SpO2 100 %  Intake/Output Summary (Last 24 hours) at 10/16/18 1237  Last data filed at 10/16/18 0814   Gross per 24 hour   Intake           591 67 ml   Output             1125 ml   Net          -533 33 ml         PHYSICAL EXAM:      General Appearance:   A&Ox3, cooperative, no distress, appears stated age    HEENT:   Normocephalic, atraumatic  Right eye exhibits no discharge  Left eye exhibits no discharge  No scleral icterus  Neck:  Supple, symmetrical, trachea midline, no stridor    Lungs:   Clear to auscultation bilaterally; no rales, rhonchi or wheezing    Heart[de-identified]   S1 and S2 normal; regular rate and rhythm; no murmur, rub, or gallop     Abdomen:   Soft, non-tender, non-distended; normal bowel sounds; no masses, no organomegaly    Genitalia:   Deferred    Rectal:   Deferred    Extremities:  No cyanosis, clubbing or edema        Skin:  Skin color, texture, turgor normal, no rashes or lesions          Lab Results:   Admission on 10/14/2018   Component Date Value    WBC 10/14/2018 6 75     RBC 10/14/2018 4 63     Hemoglobin 10/14/2018 13 8     Hematocrit 10/14/2018 41 6     MCV 10/14/2018 90     MCH 10/14/2018 29 8     MCHC 10/14/2018 33 2     RDW 10/14/2018 14 5     MPV 10/14/2018 10 2     Platelets 84/29/0532 320     nRBC 10/14/2018 0     Neutrophils Relative 10/14/2018 62     Immat GRANS % 10/14/2018 0     Lymphocytes Relative 10/14/2018 28     Monocytes Relative 10/14/2018 7     Eosinophils Relative 10/14/2018 2     Basophils Relative 10/14/2018 1     Neutrophils Absolute 10/14/2018 4 25     Immature Grans Absolute 10/14/2018 0 02     Lymphocytes Absolute 10/14/2018 1 86     Monocytes Absolute 10/14/2018 0 47     Eosinophils Absolute 10/14/2018 0 10     Basophils Absolute 10/14/2018 0 05     Sodium 10/14/2018 137     Potassium 10/14/2018 2 6*    Chloride 10/14/2018 105     CO2 10/14/2018 22     ANION GAP 10/14/2018 10     BUN 10/14/2018 15     Creatinine 10/14/2018 1 13     Glucose 10/14/2018 74     Calcium 10/14/2018 8 3     eGFR 10/14/2018 64     Magnesium 10/14/2018 2 2     Phosphorus 10/14/2018 3 3     Troponin I 10/14/2018 <0 02     Platelets 40/36/2648 260     MPV 10/14/2018 10 4     Troponin I 10/14/2018 <0 02     Potassium Urine Timed 10/15/2018 14 2     Troponin I 10/14/2018 <0 02     Sodium 10/14/2018 141     Potassium 10/14/2018 2 9*    Chloride 10/14/2018 111*    CO2 10/14/2018 16*    ANION GAP 10/14/2018 14*    BUN 10/14/2018 15     Creatinine 10/14/2018 0 91     Glucose 10/14/2018 75     Calcium 10/14/2018 7 8*    eGFR 10/14/2018 83     Sodium 10/15/2018 143     Potassium 10/15/2018 3 6     Chloride 10/15/2018 114*    CO2 10/15/2018 17*    ANION GAP 10/15/2018 12     BUN 10/15/2018 12     Creatinine 10/15/2018 0 84     Glucose 10/15/2018 82     Calcium 10/15/2018 7 7*    AST 10/15/2018 21     ALT 10/15/2018 29     Alkaline Phosphatase 10/15/2018 37*    Total Protein 10/15/2018 6 0*    Albumin 10/15/2018 3 0*    Total Bilirubin 10/15/2018 0 20     eGFR 10/15/2018 91     Magnesium 10/15/2018 2 1     Phosphorus 10/15/2018 2 6*    WBC 10/15/2018 5 87     RBC 10/15/2018 3 88     Hemoglobin 10/15/2018 11 5     Hematocrit 10/15/2018 36 2     MCV 10/15/2018 93     MCH 10/15/2018 29 6     MCHC 10/15/2018 31 8     RDW 10/15/2018 14 6     MPV 10/15/2018 10 8     Platelets 87/03/2053 271     nRBC 10/15/2018 0     Neutrophils Relative 10/15/2018 47     Immat GRANS % 10/15/2018 0     Lymphocytes Relative 10/15/2018 40     Monocytes Relative 10/15/2018 9     Eosinophils Relative 10/15/2018 3     Basophils Relative 10/15/2018 1     Neutrophils Absolute 10/15/2018 2 83     Immature Grans Absolute 10/15/2018 0 02     Lymphocytes Absolute 10/15/2018 2 32     Monocytes Absolute 10/15/2018 0 50     Eosinophils Absolute 10/15/2018 0 16     Basophils Absolute 10/15/2018 0 04     Ventricular Rate 10/14/2018 85     Atrial Rate 10/14/2018 85     OK Interval 10/14/2018 120     QRSD Interval 10/14/2018 102     QT Interval 10/14/2018 384     QTC Interval 10/14/2018 456     P Axis 10/14/2018 53     QRS Axis 10/14/2018 22     T Wave Axis 10/14/2018 49     Sodium 10/15/2018 139     Potassium 10/15/2018 3 9     Chloride 10/15/2018 113*    CO2 10/15/2018 18*    ANION GAP 10/15/2018 8     BUN 10/15/2018 13     Creatinine 10/15/2018 0 96     Glucose 10/15/2018 93     Calcium 10/15/2018 7 6*    eGFR 10/15/2018 77     Sodium 10/16/2018 140     Potassium 10/16/2018 3 4*    Chloride 10/16/2018 111*    CO2 10/16/2018 19*    ANION GAP 10/16/2018 10     BUN 10/16/2018 8     Creatinine 10/16/2018 0 83     Glucose 10/16/2018 83     Glucose, Fasting 10/16/2018 83     Calcium 10/16/2018 7 5*    eGFR 10/16/2018 92     Magnesium 10/16/2018 1 9     Phosphorus 10/16/2018 3 0        Imaging Studies: I have personally reviewed pertinent imaging studies  Xr Chest 1 View Portable    Result Date: 10/15/2018  Impression: No acute cardiopulmonary disease  This patient was seen and examined by Dr Marily Núñez  All treviño medical decisions were made by Dr Marily Núñez  Thank you for allowing us to participate in the care of this patient  We will follow up closely with you

## 2018-10-17 NOTE — NURSING NOTE
Patient discharged to home in stable condition  AVS reviewed with patient  Questions were encouraged and answered

## 2018-10-22 ENCOUNTER — OFFICE VISIT (OUTPATIENT)
Dept: FAMILY MEDICINE CLINIC | Facility: CLINIC | Age: 33
End: 2018-10-22
Payer: COMMERCIAL

## 2018-10-22 VITALS
OXYGEN SATURATION: 97 % | RESPIRATION RATE: 16 BRPM | DIASTOLIC BLOOD PRESSURE: 68 MMHG | HEART RATE: 105 BPM | BODY MASS INDEX: 27.97 KG/M2 | HEIGHT: 62 IN | TEMPERATURE: 97.8 F | SYSTOLIC BLOOD PRESSURE: 112 MMHG | WEIGHT: 152 LBS

## 2018-10-22 DIAGNOSIS — E87.6 HYPOKALEMIA: ICD-10-CM

## 2018-10-22 DIAGNOSIS — Z76.89 ENCOUNTER TO ESTABLISH CARE: Primary | ICD-10-CM

## 2018-10-22 DIAGNOSIS — Z09 HOSPITAL DISCHARGE FOLLOW-UP: ICD-10-CM

## 2018-10-22 PROBLEM — K90.89 OTHER INTESTINAL MALABSORPTION: Status: ACTIVE | Noted: 2018-10-05

## 2018-10-22 PROBLEM — Z98.84 GASTRIC BYPASS STATUS FOR OBESITY: Status: ACTIVE | Noted: 2018-10-02

## 2018-10-22 PROBLEM — K21.9 GERD (GASTROESOPHAGEAL REFLUX DISEASE): Status: ACTIVE | Noted: 2018-09-03

## 2018-10-22 LAB
ANION GAP SERPL CALCULATED.3IONS-SCNC: 11 MMOL/L (ref 4–13)
BUN SERPL-MCNC: 12 MG/DL (ref 5–25)
CALCIUM SERPL-MCNC: 8.6 MG/DL (ref 8.3–10.1)
CHLORIDE SERPL-SCNC: 110 MMOL/L (ref 100–108)
CO2 SERPL-SCNC: 15 MMOL/L (ref 21–32)
CREAT SERPL-MCNC: 0.86 MG/DL (ref 0.6–1.3)
GFR SERPL CREATININE-BSD FRML MDRD: 88 ML/MIN/1.73SQ M
GLUCOSE SERPL-MCNC: 77 MG/DL (ref 65–140)
POTASSIUM SERPL-SCNC: 4.4 MMOL/L (ref 3.5–5.3)
SODIUM SERPL-SCNC: 136 MMOL/L (ref 136–145)

## 2018-10-22 PROCEDURE — T1015 CLINIC SERVICE: HCPCS | Performed by: FAMILY MEDICINE

## 2018-10-22 PROCEDURE — 80048 BASIC METABOLIC PNL TOTAL CA: CPT | Performed by: NURSE PRACTITIONER

## 2018-10-22 NOTE — PROGRESS NOTES
OFFICE VISIT  Charly Valencia 28 y o  female MRN: [id number]      Assessment / Plan:  Diagnoses and all orders for this visit:    Encounter to establish care    Hypokalemia  -     Basic metabolic panel; Future  -     Basic metabolic panel    Hospital discharge follow-up    Follow up in one month   Draw Bmp today  Reason For Visit / Chief Complaint  Chief Complaint   Patient presents with   [address]     She states she was in the hospital and she is here to follow up  She states she had low potassium  HPI:  Charly Valencia is a 28 y o  female who presents today for follow up from hosp admission  She was admitted to [address] [place] [address] for hypokalemia, most likley realted to gastric bypass  Potassium replaced in hospital and will be having outpatient IV infusions arranged by GI  Today, she reports having symptoms, of toes and arm tingling  She reports having an appt with GI on Wednesday  She is on 60meq of KCL elixir along with aldactone  She has an est appt with Dr Ana Flynn, 11/9  Historical Information   Past Medical History:   Diagnosis Date    Hypokalemia     Migraine      Past Surgical History:   Procedure Laterality Date    ABDOMINAL SURGERY      APPENDECTOMY      CHOLECYSTECTOMY      GASTRIC BYPASS      HYSTERECTOMY       Social History   History   Alcohol Use No     Comment: socially     History   Drug Use No     History   Smoking Status    Never Smoker   Smokeless Tobacco    Never Used     Family History   Problem Relation Age of Onset    Hypertension Father     Diabetes Father     Diabetes Maternal Grandfather        Meds/Allergies   Allergies   Allergen Reactions    Nsaids      Other reaction(s):  Other (Please comment)  Should not take s/p bariatric surgery       Meds:    Current Outpatient Prescriptions:     amitriptyline (ELAVIL) 50 mg tablet, Take 50 mg by mouth daily at bedtime, Disp: , Rfl:     b complex-C-folic acid (NEPHRO-EDIE) 0 8 mg tablet, Take 0 8 mg by mouth daily with dinner, Disp: , Rfl:     ergocalciferol (ERGOCALCIFEROL) 87809 units capsule, Take 50,000 Units by mouth once a week, Disp: , Rfl:     ferrous sulfate 325 (65 Fe) mg tablet, Take 325 mg by mouth 2 (two) times a day with meals, Disp: , Rfl:     meclizine (ANTIVERT) 25 mg tablet, Take 25 mg by mouth daily at bedtime, Disp: , Rfl:     potassium chloride 10 %, Take 45 mL (60 mEq total) by mouth 3 (three) times a day, Disp: 900 mL, Rfl: 0    spironolactone (ALDACTONE) 25 mg tablet, Take 1 tablet (25 mg total) by mouth daily, Disp: 30 tablet, Rfl: 0    thiamine 100 MG tablet, Take 100 mg by mouth daily, Disp: , Rfl:     zinc gluconate 50 mg tablet, Take 50 mg by mouth daily, Disp: , Rfl:       REVIEW OF SYSTEMS  Review of Systems   Constitutional: Negative for activity change, chills, fatigue and fever  HENT: Negative for congestion, ear discharge, ear pain, sinus pain, sinus pressure, sore throat, tinnitus and trouble swallowing  Eyes: Negative for photophobia, pain, discharge, itching and visual disturbance  Respiratory: Negative for cough, chest tightness, shortness of breath and wheezing  Cardiovascular: Negative for chest pain and leg swelling  Gastrointestinal: Negative for abdominal distention, abdominal pain, constipation, diarrhea, nausea and vomiting  Endocrine: Negative for polydipsia, polyphagia and polyuria  Genitourinary: Negative for dysuria and frequency  Musculoskeletal: Negative for arthralgias, myalgias, neck pain and neck stiffness  Skin: Negative for color change  Neurological: Negative for dizziness, syncope, weakness, numbness and headaches  Tingling   Hematological: Does not bruise/bleed easily  Psychiatric/Behavioral: Negative for behavioral problems, confusion, self-injury, sleep disturbance and suicidal ideas  The patient is not nervous/anxious              Current Vitals:   Blood Pressure: 112/68 (10/22/18 0801)  Pulse: 105 (10/22/18 0801)  Temperature: 97 8 °F (36 6 °C) (10/22/18 0801)  Respirations: 16 (10/22/18 0801)  Height: 5' 2" (157 5 cm) (10/22/18 0801)  Weight - Scale: 68 9 kg (152 lb) (10/22/18 0801)  SpO2: 97 % (10/22/18 0801)  [unfilled]    PHYSICAL EXAMS:  Physical Exam   Constitutional: She is oriented to person, place, and time  She appears well-developed and well-nourished  HENT:   Head: Normocephalic  Right Ear: External ear normal    Left Ear: External ear normal    Mouth/Throat: Oropharynx is clear and moist    Eyes: Pupils are equal, round, and reactive to light  Conjunctivae are normal    Neck: Neck supple  Cardiovascular: Normal rate and regular rhythm  Pulmonary/Chest: Effort normal and breath sounds normal    Abdominal: Soft  Bowel sounds are normal  She exhibits no distension  There is no tenderness  Musculoskeletal: Normal range of motion  Neurological: She is alert and oriented to person, place, and time  Skin: Skin is warm and dry  Psychiatric: She has a normal mood and affect  Follow up at this office in one month     Counseling / Coordination of Care  Total floor / unit time spent today 20 minutes  Greater than 50% of total time was spent with the patient and / or family counseling and / or coordination of care

## 2018-11-03 ENCOUNTER — HOSPITAL ENCOUNTER (INPATIENT)
Facility: HOSPITAL | Age: 33
LOS: 3 days | Discharge: HOME | DRG: 252 | End: 2018-11-06
Attending: EMERGENCY MEDICINE | Admitting: INTERNAL MEDICINE
Payer: COMMERCIAL

## 2018-11-03 DIAGNOSIS — R20.2 PARESTHESIA OF BOTH FEET: ICD-10-CM

## 2018-11-03 DIAGNOSIS — R20.2 PARESTHESIA OF BOTH HANDS: ICD-10-CM

## 2018-11-03 DIAGNOSIS — E87.6 HYPOKALEMIA: Primary | ICD-10-CM

## 2018-11-03 LAB
ALBUMIN SERPL BCP-MCNC: 4.1 G/DL (ref 3.5–5)
ALP SERPL-CCNC: 51 U/L (ref 46–116)
ALT SERPL W P-5'-P-CCNC: 39 U/L (ref 12–78)
ANION GAP SERPL CALCULATED.3IONS-SCNC: 13 MMOL/L (ref 4–13)
ANION GAP SERPL CALCULATED.3IONS-SCNC: 9 MMOL/L (ref 4–13)
AST SERPL W P-5'-P-CCNC: 20 U/L (ref 5–45)
BASOPHILS # BLD AUTO: 0.03 THOUSANDS/ΜL (ref 0–0.1)
BASOPHILS NFR BLD AUTO: 1 % (ref 0–1)
BILIRUB SERPL-MCNC: 0.2 MG/DL (ref 0.2–1)
BILIRUB UR QL STRIP: NEGATIVE
BUN SERPL-MCNC: 17 MG/DL (ref 5–25)
BUN SERPL-MCNC: 17 MG/DL (ref 5–25)
CALCIUM SERPL-MCNC: 8.3 MG/DL (ref 8.3–10.1)
CALCIUM SERPL-MCNC: 8.7 MG/DL (ref 8.3–10.1)
CHLORIDE SERPL-SCNC: 104 MMOL/L (ref 100–108)
CHLORIDE SERPL-SCNC: 111 MMOL/L (ref 100–108)
CLARITY UR: CLEAR
CO2 SERPL-SCNC: 20 MMOL/L (ref 21–32)
CO2 SERPL-SCNC: 22 MMOL/L (ref 21–32)
COLOR UR: YELLOW
CREAT SERPL-MCNC: 0.93 MG/DL (ref 0.6–1.3)
CREAT SERPL-MCNC: 1.11 MG/DL (ref 0.6–1.3)
CREAT UR-MCNC: 210 MG/DL
EOSINOPHIL # BLD AUTO: 0.33 THOUSAND/ΜL (ref 0–0.61)
EOSINOPHIL NFR BLD AUTO: 5 % (ref 0–6)
ERYTHROCYTE [DISTWIDTH] IN BLOOD BY AUTOMATED COUNT: 14.7 % (ref 11.6–15.1)
EXT PREG TEST URINE: NEGATIVE
GFR SERPL CREATININE-BSD FRML MDRD: 65 ML/MIN/1.73SQ M
GFR SERPL CREATININE-BSD FRML MDRD: 80 ML/MIN/1.73SQ M
GLUCOSE SERPL-MCNC: 63 MG/DL (ref 65–140)
GLUCOSE SERPL-MCNC: 88 MG/DL (ref 65–140)
GLUCOSE UR STRIP-MCNC: NEGATIVE MG/DL
HCG SERPL QL: NEGATIVE
HCT VFR BLD AUTO: 43.2 % (ref 34.8–46.1)
HGB BLD-MCNC: 14.2 G/DL (ref 11.5–15.4)
HGB UR QL STRIP.AUTO: NEGATIVE
IMM GRANULOCYTES # BLD AUTO: 0.01 THOUSAND/UL (ref 0–0.2)
IMM GRANULOCYTES NFR BLD AUTO: 0 % (ref 0–2)
KETONES UR STRIP-MCNC: NEGATIVE MG/DL
LEUKOCYTE ESTERASE UR QL STRIP: NEGATIVE
LYMPHOCYTES # BLD AUTO: 2.18 THOUSANDS/ΜL (ref 0.6–4.47)
LYMPHOCYTES NFR BLD AUTO: 33 % (ref 14–44)
MAGNESIUM SERPL-MCNC: 2.2 MG/DL (ref 1.6–2.6)
MCH RBC QN AUTO: 29.8 PG (ref 26.8–34.3)
MCHC RBC AUTO-ENTMCNC: 32.9 G/DL (ref 31.4–37.4)
MCV RBC AUTO: 91 FL (ref 82–98)
MONOCYTES # BLD AUTO: 0.4 THOUSAND/ΜL (ref 0.17–1.22)
MONOCYTES NFR BLD AUTO: 6 % (ref 4–12)
NEUTROPHILS # BLD AUTO: 3.59 THOUSANDS/ΜL (ref 1.85–7.62)
NEUTS SEG NFR BLD AUTO: 55 % (ref 43–75)
NITRITE UR QL STRIP: NEGATIVE
NRBC BLD AUTO-RTO: 0 /100 WBCS
PH UR STRIP.AUTO: 6.5 [PH] (ref 4.5–8)
PHOSPHATE SERPL-MCNC: 4.8 MG/DL (ref 2.7–4.5)
PLATELET # BLD AUTO: 323 THOUSANDS/UL (ref 149–390)
PMV BLD AUTO: 10 FL (ref 8.9–12.7)
POTASSIUM SERPL-SCNC: 2.6 MMOL/L (ref 3.5–5.3)
POTASSIUM SERPL-SCNC: 3.7 MMOL/L (ref 3.5–5.3)
POTASSIUM UR-SCNC: 14 MMOL/L (ref 1–300)
PROT SERPL-MCNC: 8 G/DL (ref 6.4–8.2)
PROT UR STRIP-MCNC: NEGATIVE MG/DL
RBC # BLD AUTO: 4.76 MILLION/UL (ref 3.81–5.12)
SODIUM SERPL-SCNC: 139 MMOL/L (ref 136–145)
SODIUM SERPL-SCNC: 140 MMOL/L (ref 136–145)
SP GR UR STRIP.AUTO: >=1.03 (ref 1–1.03)
TROPONIN I SERPL-MCNC: <0.02 NG/ML
TROPONIN I SERPL-MCNC: <0.02 NG/ML
UROBILINOGEN UR QL STRIP.AUTO: 0.2 E.U./DL
UUN 24H UR-MCNC: 1311 MG/DL
WBC # BLD AUTO: 6.54 THOUSAND/UL (ref 4.31–10.16)

## 2018-11-03 PROCEDURE — 96365 THER/PROPH/DIAG IV INF INIT: CPT

## 2018-11-03 PROCEDURE — 99223 1ST HOSP IP/OBS HIGH 75: CPT | Performed by: INTERNAL MEDICINE

## 2018-11-03 PROCEDURE — 80053 COMPREHEN METABOLIC PANEL: CPT | Performed by: EMERGENCY MEDICINE

## 2018-11-03 PROCEDURE — 81003 URINALYSIS AUTO W/O SCOPE: CPT | Performed by: EMERGENCY MEDICINE

## 2018-11-03 PROCEDURE — 84484 ASSAY OF TROPONIN QUANT: CPT | Performed by: EMERGENCY MEDICINE

## 2018-11-03 PROCEDURE — 84484 ASSAY OF TROPONIN QUANT: CPT | Performed by: INTERNAL MEDICINE

## 2018-11-03 PROCEDURE — 99284 EMERGENCY DEPT VISIT MOD MDM: CPT

## 2018-11-03 PROCEDURE — 36415 COLL VENOUS BLD VENIPUNCTURE: CPT

## 2018-11-03 PROCEDURE — 84133 ASSAY OF URINE POTASSIUM: CPT | Performed by: EMERGENCY MEDICINE

## 2018-11-03 PROCEDURE — 81025 URINE PREGNANCY TEST: CPT | Performed by: EMERGENCY MEDICINE

## 2018-11-03 PROCEDURE — 80048 BASIC METABOLIC PNL TOTAL CA: CPT | Performed by: INTERNAL MEDICINE

## 2018-11-03 PROCEDURE — 84540 ASSAY OF URINE/UREA-N: CPT | Performed by: EMERGENCY MEDICINE

## 2018-11-03 PROCEDURE — 83735 ASSAY OF MAGNESIUM: CPT | Performed by: EMERGENCY MEDICINE

## 2018-11-03 PROCEDURE — 84703 CHORIONIC GONADOTROPIN ASSAY: CPT | Performed by: EMERGENCY MEDICINE

## 2018-11-03 PROCEDURE — 85025 COMPLETE CBC W/AUTO DIFF WBC: CPT | Performed by: EMERGENCY MEDICINE

## 2018-11-03 PROCEDURE — 84100 ASSAY OF PHOSPHORUS: CPT | Performed by: EMERGENCY MEDICINE

## 2018-11-03 PROCEDURE — 93005 ELECTROCARDIOGRAM TRACING: CPT

## 2018-11-03 PROCEDURE — 82570 ASSAY OF URINE CREATININE: CPT | Performed by: EMERGENCY MEDICINE

## 2018-11-03 RX ORDER — AMITRIPTYLINE HYDROCHLORIDE 50 MG/1
50 TABLET, FILM COATED ORAL
Status: DISCONTINUED | OUTPATIENT
Start: 2018-11-03 | End: 2018-11-06 | Stop reason: HOSPADM

## 2018-11-03 RX ORDER — MAGNESIUM SULFATE 1 G/100ML
1 INJECTION INTRAVENOUS ONCE
Status: COMPLETED | OUTPATIENT
Start: 2018-11-03 | End: 2018-11-03

## 2018-11-03 RX ORDER — MECLIZINE HYDROCHLORIDE 25 MG/1
25 TABLET ORAL
Status: DISCONTINUED | OUTPATIENT
Start: 2018-11-03 | End: 2018-11-06 | Stop reason: HOSPADM

## 2018-11-03 RX ORDER — FERROUS SULFATE 325(65) MG
325 TABLET ORAL 2 TIMES DAILY WITH MEALS
Status: DISCONTINUED | OUTPATIENT
Start: 2018-11-03 | End: 2018-11-06 | Stop reason: HOSPADM

## 2018-11-03 RX ORDER — POTASSIUM CHLORIDE 20MEQ/15ML
40 LIQUID (ML) ORAL 2 TIMES DAILY
Status: DISCONTINUED | OUTPATIENT
Start: 2018-11-03 | End: 2018-11-04

## 2018-11-03 RX ORDER — ACETAMINOPHEN 325 MG/1
650 TABLET ORAL EVERY 6 HOURS PRN
Status: DISCONTINUED | OUTPATIENT
Start: 2018-11-03 | End: 2018-11-06 | Stop reason: HOSPADM

## 2018-11-03 RX ORDER — POTASSIUM CHLORIDE AND SODIUM CHLORIDE 900; 300 MG/100ML; MG/100ML
150 INJECTION, SOLUTION INTRAVENOUS CONTINUOUS
Status: DISCONTINUED | OUTPATIENT
Start: 2018-11-03 | End: 2018-11-04

## 2018-11-03 RX ORDER — POTASSIUM CHLORIDE 14.9 MG/ML
40 INJECTION INTRAVENOUS ONCE
Status: COMPLETED | OUTPATIENT
Start: 2018-11-03 | End: 2018-11-03

## 2018-11-03 RX ORDER — ZINC GLUCONATE 50 MG
50 TABLET ORAL DAILY
Status: DISCONTINUED | OUTPATIENT
Start: 2018-11-03 | End: 2018-11-03 | Stop reason: RX

## 2018-11-03 RX ORDER — ZINC SULFATE 50(220)MG
220 CAPSULE ORAL DAILY
Status: DISCONTINUED | OUTPATIENT
Start: 2018-11-03 | End: 2018-11-06 | Stop reason: HOSPADM

## 2018-11-03 RX ORDER — ERGOCALCIFEROL 1.25 MG/1
50000 CAPSULE ORAL WEEKLY
Status: DISCONTINUED | OUTPATIENT
Start: 2018-11-05 | End: 2018-11-06 | Stop reason: HOSPADM

## 2018-11-03 RX ORDER — THIAMINE MONONITRATE (VIT B1) 100 MG
100 TABLET ORAL DAILY
Status: DISCONTINUED | OUTPATIENT
Start: 2018-11-03 | End: 2018-11-06 | Stop reason: HOSPADM

## 2018-11-03 RX ADMIN — ZINC SULFATE CAP 220 MG (50 MG ELEMENTAL ZN) 220 MG: 220 (50 ZN) CAP at 17:32

## 2018-11-03 RX ADMIN — POTASSIUM CHLORIDE 40 MEQ: 20 SOLUTION ORAL at 17:33

## 2018-11-03 RX ADMIN — Medication 100 MG: at 17:32

## 2018-11-03 RX ADMIN — POTASSIUM CHLORIDE AND SODIUM CHLORIDE 150 ML/HR: 900; 300 INJECTION, SOLUTION INTRAVENOUS at 18:18

## 2018-11-03 RX ADMIN — POTASSIUM CHLORIDE 40 MEQ: 200 INJECTION, SOLUTION INTRAVENOUS at 13:26

## 2018-11-03 RX ADMIN — MECLIZINE HYDROCHLORIDE 25 MG: 25 TABLET ORAL at 21:58

## 2018-11-03 RX ADMIN — FERROUS SULFATE TAB 325 MG (65 MG ELEMENTAL FE) 325 MG: 325 (65 FE) TAB at 17:32

## 2018-11-03 RX ADMIN — MAGNESIUM SULFATE HEPTAHYDRATE 1 G: 1 INJECTION, SOLUTION INTRAVENOUS at 18:20

## 2018-11-03 RX ADMIN — AMITRIPTYLINE HYDROCHLORIDE 50 MG: 50 TABLET, FILM COATED ORAL at 21:59

## 2018-11-03 RX ADMIN — SODIUM CHLORIDE 1000 ML: 0.9 INJECTION, SOLUTION INTRAVENOUS at 13:26

## 2018-11-03 NOTE — PLAN OF CARE
Problem: Potential for Falls  Goal: Patient will remain free of falls  INTERVENTIONS:  - Assess patient frequently for physical needs  -  Identify cognitive and physical deficits and behaviors that affect risk of falls  -  [place] fall precautions as indicated by assessment   Medium fall risk    - Educate patient/family on patient safety including physical limitations  - Instruct patient to call for assistance with activity based on assessment  - Modify environment to reduce risk of injury  Outcome: Progressing      Problem: METABOLIC, FLUID AND ELECTROLYTES - ADULT  Goal: Electrolytes maintained within normal limits  INTERVENTIONS:  - Monitor labs and assess patient for signs and symptoms of electrolyte imbalances (hypokalemia)  - Administer electrolyte replacement as ordered  - Monitor response to electrolyte replacements, including repeat lab results as appropriate  - Instruct patient on fluid and nutrition as appropriate   Outcome: Progressing    Goal: Fluid balance maintained  INTERVENTIONS:  - Monitor labs and assess for signs and symptoms of volume excess or deficit  - Monitor I/O and WT  - Instruct patient on fluid and nutrition as appropriate   Outcome: Progressing      Problem: PAIN - ADULT  Goal: Verbalizes/displays adequate comfort level or baseline comfort level  Interventions:  - Encourage patient to monitor pain and request assistance  - Assess pain using appropriate pain scale (0-10 pain scale)  - Administer analgesics based on type and severity of pain and evaluate response  - Implement non-pharmacological measures as appropriate and evaluate response  - Notify physician/advanced practitioner if interventions unsuccessful or patient reports new pain   Outcome: Progressing      Problem: INFECTION - ADULT  Goal: Absence or prevention of progression during hospitalization  INTERVENTIONS:  - Assess and monitor for signs and symptoms of infection  - Monitor lab/diagnostic results  - Monitor all insertion sites, i e  indwelling lines  - [place] appropriate cooling/warming therapies per order  - Administer medications as ordered  - Instruct and encourage patient and family to use good hand hygiene technique  Outcome: Progressing    Goal: Absence of fever/infection during neutropenic period  INTERVENTIONS:  - Monitor WBC  - Implement neutropenic guidelines   Outcome: Completed Date Met: 11/03/18      Problem: SAFETY ADULT  Goal: Maintain or return to baseline ADL function  INTERVENTIONS:  -  Assess patient's ability to carry out ADLs; assess patient's baseline for ADL function and identify physical deficits which impact ability to perform ADLs (bathing, care of mouth/teeth, toileting, grooming, dressing, etc )  - Assess/evaluate cause of self-care deficits   - Assess range of motion  - Assess patient's mobility; develop plan if impaired  - Encourage maximum independence but intervene and supervise when necessary  ¯ Involve family in performance of ADLs  ¯ Assess for home care needs following discharge   ¯ Request OT consult to assist with ADL evaluation and planning for discharge  ¯ Provide patient education as appropriate   Outcome: Progressing    Goal: Maintain or return mobility status to optimal level  INTERVENTIONS:  - Assess patient's baseline mobility status (independent)  - Identify cognitive and physical deficits and behaviors that affect mobility  - [place] fall precautions as indicated by assessment   Medium fall risk    - Record patient progress and toleration of activity level on Mobility SBAR; progress patient to next Phase/Stage  - Instruct patient to call for assistance with activity based on assessment  Outcome: Progressing      Problem: DISCHARGE PLANNING  Goal: Discharge to home or other facility with appropriate resources  INTERVENTIONS:  - Arrange for needed discharge resources and transportation as appropriate  - Identify discharge learning needs (meds)  - Refer to Case Management Department for coordinating discharge planning if the patient needs post-hospital services based on physician/advanced practitioner order or complex needs related to functional status, cognitive ability, or social support system   Outcome: Progressing      Problem: Knowledge Deficit  Goal: Patient/family/caregiver demonstrates understanding of disease process, treatment plan, medications, and discharge instructions  Complete learning assessment and assess knowledge base    Interventions:  - Provide teaching at level of understanding  - Provide teaching via preferred learning methods (verbal, instruction, demonstrations)  Outcome: Progressing

## 2018-11-03 NOTE — ED PROVIDER NOTES
History  Chief Complaint   Patient presents with    Abnormal Lab     taking 260meq of potassium a day  states she had issues with this for a while had blood work thursday and it was 3 3  states she feels her hands and feet going numb when her potassium drops     This is a 58-year-old woman with the noted past medical history who presents to the emergency department with complaints of paresthesias of the volar fingers and toes present and worsening over the past 2-3 days and indicative to her of recurrent hypokalemia  Paresthesias are present continuously; they are of sufficient severity to interfere with her dexterity with picking up small objects particularly in the last 24 hr  No associated visual changes/dysarthria/facial paresthesias/extremity weakness  She has been seen in this facility and admitted multiple times within the past 6 months for recurrent hypokalemia suspected to be related to poor absorption given previous gastric bypass surgery  She is on outpatient oral potassium repletion with liquid potassium; she is prescribed 180 mEq per day divided t i d  She has increased her intake over the past week to a total of approximately 240 mEq divided over 5 doses  She reports no changes in her diet within the past several weeks and states that she has felt well; she specifically denies any abdominal pain/nausea/vomiting/diarrhea/dysuria/hematuria/urgency/frequency  There has been no unexpected weight loss at any point  She has not had any other medication changes in the same time  Does see nephrologist Dr Meagan Childers appt with him 9 Nov   No other acute changes in health or complaints at present  Did have outpatient potassium drawn 3d prior that was reported to her at 3 3  She was somewhat symptomatic at that time also but is more so now  A/p:  Recurrent hypokalemia versus other electrolyte abnormality    No EKG or cardiac monitor changes during my initial evaluation and no demonstrable sensation changes nor any weakness or any cranial nerve symptoms  Will check CBC/BMP/magnesium/phosphorus  Anticipate need for IV potassium repletion  Disposition pending  History provided by:  Patient and medical records      Prior to Admission Medications   Prescriptions Last Dose Informant Patient Reported? Taking?   amitriptyline (ELAVIL) 50 mg tablet   Yes Yes   Sig: Take 50 mg by mouth daily at bedtime   b complex-C-folic acid (NEPHRO-EDIE) 0 8 mg tablet   Yes Yes   Sig: Take 0 8 mg by mouth daily with dinner   ergocalciferol (ERGOCALCIFEROL) 79129 units capsule 11/3/2018 at Unknown time  Yes Yes   Sig: Take 50,000 Units by mouth once a week   ferrous sulfate 325 (65 Fe) mg tablet 11/3/2018 at Unknown time  Yes Yes   Sig: Take 325 mg by mouth 2 (two) times a day with meals   meclizine (ANTIVERT) 25 mg tablet   Yes Yes   Sig: Take 25 mg by mouth daily at bedtime   potassium chloride 10 % 11/3/2018 at Unknown time  No Yes   Sig: Take 45 mL (60 mEq total) by mouth 3 (three) times a day   spironolactone (ALDACTONE) 25 mg tablet   No Yes   Sig: Take 1 tablet (25 mg total) by mouth daily   thiamine 100 MG tablet 11/3/2018 at Unknown time  Yes Yes   Sig: Take 100 mg by mouth daily   zinc gluconate 50 mg tablet 11/3/2018 at Unknown time  Yes Yes   Sig: Take 50 mg by mouth daily      Facility-Administered Medications: None       Past Medical History:   Diagnosis Date    Hypokalemia     Migraine        Past Surgical History:   Procedure Laterality Date    ABDOMINAL SURGERY      APPENDECTOMY      CHOLECYSTECTOMY      GASTRIC BYPASS      HYSTERECTOMY         Family History   Problem Relation Age of Onset    Hypertension Father     Diabetes Father     Diabetes Maternal Grandfather      I have reviewed and agree with the history as documented      Social History   Substance Use Topics    Smoking status: Never Smoker    Smokeless tobacco: Never Used    Alcohol use No      Comment: socially        Review of Systems   Constitutional: Negative for chills and fever  Respiratory: Negative for cough and shortness of breath  Cardiovascular: Negative for chest pain and palpitations  Gastrointestinal: Negative for abdominal pain, diarrhea, nausea and vomiting  Genitourinary: Negative for difficulty urinating, dysuria and flank pain  Skin: Negative for color change, pallor, rash and wound  Neurological: Positive for numbness (Paresthesias/hypoesthesias of hands and feet)  Negative for dizziness, weakness and headaches  Hematological: Negative for adenopathy  Does not bruise/bleed easily  All other systems reviewed and are negative  Physical Exam  Physical Exam   Constitutional: She is oriented to person, place, and time  She appears well-developed and well-nourished  She is cooperative  No distress  HENT:   Head: Normocephalic and atraumatic  Right Ear: Hearing and external ear normal    Left Ear: Hearing and external ear normal    Nose: Nose normal    Eyes: Pupils are equal, round, and reactive to light  Conjunctivae, EOM and lids are normal    Neck: Trachea normal, normal range of motion and phonation normal  Neck supple  No JVD present  No tracheal tenderness, no spinous process tenderness and no muscular tenderness present  No tracheal deviation present  No thyroid mass and no thyromegaly present  Cardiovascular: Normal rate, regular rhythm, S1 normal, S2 normal, normal heart sounds and intact distal pulses  Exam reveals no gallop and no friction rub  No murmur heard  Pulses:       Radial pulses are 2+ on the right side, and 2+ on the left side  Dorsalis pedis pulses are 2+ on the right side, and 2+ on the left side  Posterior tibial pulses are 2+ on the right side, and 2+ on the left side  Pulmonary/Chest: Effort normal and breath sounds normal  No stridor  No respiratory distress  She has no decreased breath sounds  She has no wheezes  She has no rhonchi  She has no rales  She exhibits no tenderness  Abdominal: Soft  She exhibits no distension and no mass  There is no tenderness  There is no rigidity, no rebound, no guarding and no CVA tenderness  Musculoskeletal: Normal range of motion  She exhibits no edema, tenderness or deformity  Lymphadenopathy:     She has no cervical adenopathy  Neurological: She is alert and oriented to person, place, and time  She has normal strength  No cranial nerve deficit or sensory deficit  She exhibits normal muscle tone  GCS eye subscore is 4  GCS verbal subscore is 5  GCS motor subscore is 6  PERRLA; EOMI  Sensation intact to light touch over face in V1-V3 distribution bilaterally  Facial expressions symmetric  Tongue/uvula midline  Shoulder shrug equal bilaterally  Strength 5/5 in UE/LE bilaterally  Sensation intact to light touch in UE/LE bilaterally: no reproducible paresthesias in any extremity  Skin: Skin is warm, dry and intact  No rash noted  No erythema  Psychiatric: She has a normal mood and affect  Her speech is normal and behavior is normal    Nursing note and vitals reviewed        Vital Signs  ED Triage Vitals [11/03/18 1233]   Temperature Pulse Respirations Blood Pressure SpO2   98 °F (36 7 °C) 98 18 126/56 100 %      Temp Source Heart Rate Source Patient Position - Orthostatic VS BP Location FiO2 (%)   Temporal Monitor Sitting Left arm --      Pain Score       No Pain           Vitals:    11/03/18 1233 11/03/18 1315 11/03/18 1415 11/03/18 1446   BP: 126/56  128/54 115/69   Pulse: 98 88 89 81   Patient Position - Orthostatic VS: Sitting   Sitting       Visual Acuity      ED Medications  Medications   potassium chloride 20 mEq IVPB (premix) (40 mEq Intravenous New Bag 11/3/18 1326)   sodium chloride 0 9 % bolus 1,000 mL (1,000 mL Intravenous New Bag 11/3/18 1326)       Diagnostic Studies  Results Reviewed     Procedure Component Value Units Date/Time    Potassium, urine, random [[id number]] Collected:  11/03/18 1416 Lab Status: In process Specimen:  Urine from Urine, Clean Catch Updated:  11/03/18 1440    Urea nitrogen, urine [[id number]] Collected:  11/03/18 1418    Lab Status: In process Specimen:  Urine from Urine, Clean Catch Updated:  11/03/18 1440    Creatinine, urine, random [[id number]] Collected:  11/03/18 1418    Lab Status:   In process Specimen:  Urine from Urine, Clean Catch Updated:  11/03/18 1440    UA w Reflex to Microscopic w Reflex to Culture [[id number]] Collected:  11/03/18 1418    Lab Status:  Final result Specimen:  Urine from Urine, Clean Catch Updated:  11/03/18 1429     Color, UA Yellow     Clarity, UA Clear     Specific Gravity, UA >=1 030     pH, UA 6 5     Leukocytes, UA Negative     Nitrite, UA Negative     Protein, UA Negative mg/dl      Glucose, UA Negative mg/dl      Ketones, UA Negative mg/dl      Urobilinogen, UA 0 2 E U /dl      Bilirubin, UA Negative     Blood, UA Negative    POCT pregnancy, urine [[id number]]  (Normal) Resulted:  11/03/18 1416    Lab Status:  Final result Updated:  11/03/18 1416     EXT PREG TEST UR (Ref: Negative) negative    hCG, qualitative pregnancy [[id number]]  (Normal) Collected:  11/03/18 1248    Lab Status:  Final result Specimen:  Blood from Arm, Right Updated:  11/03/18 1400     Preg, Serum Negative    Comprehensive metabolic panel [[id number]]  (Abnormal) Collected:  11/03/18 1248    Lab Status:  Final result Specimen:  Blood from Arm, Right Updated:  11/03/18 1318     Sodium 139 mmol/L      Potassium 2 6 (LL) mmol/L      Chloride 104 mmol/L      CO2 22 mmol/L      ANION GAP 13 mmol/L      BUN 17 mg/dL      Creatinine 1 11 mg/dL      Glucose 88 mg/dL      Calcium 8 7 mg/dL      AST 20 U/L      ALT 39 U/L      Alkaline Phosphatase 51 U/L      Total Protein 8 0 g/dL      Albumin 4 1 g/dL      Total Bilirubin 0 20 mg/dL      eGFR 65 ml/min/1 73sq m     Narrative:         National Kidney Disease Education Program recommendations are as follows:  GFR calculation is accurate only with a steady state creatinine  Chronic Kidney disease less than 60 ml/min/1 73 sq  meters  Kidney failure less than 15 ml/min/1 73 sq  meters      Troponin I [[id number]]  (Normal) Collected:  11/03/18 1248    Lab Status:  Final result Specimen:  Blood from Arm, Right Updated:  11/03/18 1310     Troponin I <0 02 ng/mL     Magnesium [[id number]]  (Normal) Collected:  11/03/18 1248    Lab Status:  Final result Specimen:  Blood from Arm, Right Updated:  11/03/18 1310     Magnesium 2 2 mg/dL     Phosphorus [[id number]]  (Abnormal) Collected:  11/03/18 1248    Lab Status:  Final result Specimen:  Blood from Arm, Right Updated:  11/03/18 1310     Phosphorus 4 8 (H) mg/dL     CBC and differential [[id number]] Collected:  11/03/18 1248    Lab Status:  Final result Specimen:  Blood from Arm, Right Updated:  11/03/18 1255     WBC 6 54 Thousand/uL      RBC 4 76 Million/uL      Hemoglobin 14 2 g/dL      Hematocrit 43 2 %      MCV 91 fL      MCH 29 8 pg      MCHC 32 9 g/dL      RDW 14 7 %      MPV 10 0 fL      Platelets 273 Thousands/uL      nRBC 0 /100 WBCs      Neutrophils Relative 55 %      Immat GRANS % 0 %      Lymphocytes Relative 33 %      Monocytes Relative 6 %      Eosinophils Relative 5 %      Basophils Relative 1 %      Neutrophils Absolute 3 59 Thousands/µL      Immature Grans Absolute 0 01 Thousand/uL      Lymphocytes Absolute 2 18 Thousands/µL      Monocytes Absolute 0 40 Thousand/µL      Eosinophils Absolute 0 33 Thousand/µL      Basophils Absolute 0 03 Thousands/µL                  No orders to display              Procedures  ECG 12 Lead Documentation  Date/Time: 11/3/2018 12:57 PM  Performed by: Eh Funez  Authorized by: Eh Funez     Indications / Diagnosis:  Paresthesias/suspected hypokalemia  ECG reviewed by me, the ED Provider: yes    Patient location:  ED  Previous ECG:     Previous ECG:  Compared to current    Comparison ECG info:  14 Oct 2018    Similarity:  No change    Comparison to cardiac monitor: Yes    Interpretation:     Interpretation: normal    Rate:     ECG rate:  91    ECG rate assessment: normal    Ectopy:     Ectopy: none    QRS:     QRS axis:  Normal    QRS intervals:  Normal  Conduction:     Conduction: normal    ST segments:     ST segments:  Normal  T waves:     T waves: normal    Comments:      Pr 108 qrs 96 qtc 432             Phone Contacts  ED Phone Contact    ED Course  ED Course as of Nov 03 1452   Sat Nov 03, 2018   1319 1  WBC wnl   2  Hg/Hct wnl   3  Plt wnl   4  Electrolytes: significant hypokalemia (worse from value of 3 3 obtained 2d prior) and mild hyperphosphatemia  5  Troponin negative  6  Qualitative HCG negative  Patient has taken 160 mEq potassium orally thus far today and is having worsening serum level despite this  Will give 40 mEq IV for starting dose and plan for hospital admission  94 31 11 Patient updated regarding results of workup thus far and need for admission  She is agreeable  D/w Dr Melody Tong who agrees with plan for admission  1430 UA: concentrated sample  Otherwise wnl          MDM  Number of Diagnoses or Management Options  Hypokalemia: established and worsening  Paresthesia of both feet: established and worsening  Paresthesia of both hands: established and worsening     Amount and/or Complexity of Data Reviewed  Clinical lab tests: ordered and reviewed  Decide to obtain previous medical records or to obtain history from someone other than the patient: yes  Review and summarize past medical records: yes  Discuss the patient with other providers: yes    Risk of Complications, Morbidity, and/or Mortality  Presenting problems: high  Diagnostic procedures: high  Management options: high    Patient Progress  Patient progress: stable    CritCare Time    Disposition  Final diagnoses:   Hypokalemia   Paresthesia of both feet   Paresthesia of both hands     Time reflects when diagnosis was documented in both MDM as applicable and the Disposition within this note     Time User Action Codes Description Comment    11/3/2018  2:01 PM Compa Au Add [E87 6] Hypokalemia     11/3/2018  2:01 PM Compa Au Add [R20 2] Paresthesia of both feet     11/3/2018  2:02 PM Compa Au Add [R20 2] Paresthesia of both hands       ED Disposition     ED Disposition Condition Comment    Admit  Case was discussed with Dr Lizbet Hutson and the patient's admission status was agreed to be Admission Status: inpatient status to the service of Dr Lizbet Hutson  Follow-up Information    None         Current Discharge Medication List      CONTINUE these medications which have NOT CHANGED    Details   amitriptyline (ELAVIL) 50 mg tablet Take 50 mg by mouth daily at bedtime      b complex-C-folic acid (NEPHRO-EDIE) 0 8 mg tablet Take 0 8 mg by mouth daily with dinner      ergocalciferol (ERGOCALCIFEROL) 58398 units capsule Take 50,000 Units by mouth once a week      ferrous sulfate 325 (65 Fe) mg tablet Take 325 mg by mouth 2 (two) times a day with meals      meclizine (ANTIVERT) 25 mg tablet Take 25 mg by mouth daily at bedtime      potassium chloride 10 % Take 45 mL (60 mEq total) by mouth 3 (three) times a day  Qty: 900 mL, Refills: 0    Associated Diagnoses: Hypokalemia      spironolactone (ALDACTONE) 25 mg tablet Take 1 tablet (25 mg total) by mouth daily  Qty: 30 tablet, Refills: 0    Associated Diagnoses: Hypokalemia      thiamine 100 MG tablet Take 100 mg by mouth daily      zinc gluconate 50 mg tablet Take 50 mg by mouth daily           No discharge procedures on file      ED Provider  Electronically Signed by           James Dye DO  11/03/18 8765

## 2018-11-03 NOTE — H&P
History and Physical - [facility] [place] [facility]    Patient Information: Bertrand Hannon 28 y o  female MRN: [id number]  Unit/Bed#: 401-01 Encounter: [id number]  Admitting Physician: Sal Holt MD  PCP: ARIADNA Dorado  Date of Admission:  11/03/18    Assessment/Plan:    Hospital Problem List:     Active Problems:    Hypokalemia    History of gastric bypass    Paresthesia of both lower extremities    WAGNER (obstructive sleep apnea)    Other intestinal malabsorption    Severe hypokalemia    Plan for the Primary Problem(s):  · Admit to telemetry, seizure precautions, IV KCl supplements to achieve serum level 4 or >  · Serial BMP checks    Plan for Additional Problems:   · Case management consult re outpatient BMP checks q2-4 weeks & once symptomatic with Infusion Center IV KCl administration as needed to prevent hospital readmission    VTE Prophylaxis: Enoxaparin (Lovenox)  / sequential compression device   Code Status: full  POLST: There is no POLST form on file for this patient (pre-hospital)    Anticipated Length of Stay:  Patient will be admitted on an Inpatient basis with an anticipated length of stay of  > 2 midnights  Justification for Hospital Stay: IV KCl, telemetry      Chief Complaint:   "my potassium is low again"    History of Present Illness:    Bertrand Hannon is a 28 y o  female who has had multiple hospital admissions at [facility] past few months due to recurrent severe hypokalemia that typically manifest with bilat distal lower extremity paresthesias  This has been attributed to 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  She denies any recent diarrhea, nausea or vomiting  She usually takes KCl liquid supplements but despite increased frequency past 2-3 days, she continued to experience worsening tingling to both feet & later on hands  She sought medical attention at this hospital's ED today due to suspected severe hypokalemia, with serum K returning at 2 6    She has received IV KCl supplementation  She denies fever, cough, chest pain, palpitations, dyspnea, abd pain, headache, blurry vision, skin rash or near syncope  Review of Systems:    A 10+ point review of systems was obtained & is as above, otherwise negative  Review of Systems    Past Medical and Surgical History:     Past Medical History:   Diagnosis Date    Hypokalemia     Migraine        Past Surgical History:   Procedure Laterality Date    ABDOMINAL SURGERY      APPENDECTOMY      CHOLECYSTECTOMY      GASTRIC BYPASS      HYSTERECTOMY         Meds/Allergies:    Prior to Admission medications    Medication Sig Start Date End Date Taking? Authorizing Provider   amitriptyline (ELAVIL) 50 mg tablet Take 50 mg by mouth daily at bedtime 8/27/18 8/27/19 Yes Historical Provider, MD   b complex-EMMA-folic acid (NEPHRO-EDIE) 0 8 mg tablet Take 0 8 mg by mouth daily with dinner   Yes Historical Provider, MD   ergocalciferol (ERGOCALCIFEROL) 33639 units capsule Take 50,000 Units by mouth once a week   Yes Historical Provider, MD   ferrous sulfate 325 (65 Fe) mg tablet Take 325 mg by mouth 2 (two) times a day with meals   Yes Historical Provider, MD   meclizine (ANTIVERT) 25 mg tablet Take 25 mg by mouth daily at bedtime 8/27/18  Yes Historical Provider, MD   potassium chloride 10 % Take 45 mL (60 mEq total) by mouth 3 (three) times a day 10/16/18  Yes Wendi Machado MD   spironolactone (ALDACTONE) 25 mg tablet Take 1 tablet (25 mg total) by mouth daily 9/25/18  Yes Evie Reynoso PA-C   thiamine 100 MG tablet Take 100 mg by mouth daily   Yes Historical Provider, MD   zinc gluconate 50 mg tablet Take 50 mg by mouth daily   Yes Historical Provider, MD     I have reviewed home medications with patient personally  Allergies: Allergies   Allergen Reactions    Nsaids      Other reaction(s):  Other (Please comment)  Should not take s/p bariatric surgery       Social History:     Marital Status: /Civil Union   Patient Pre-hospital Living Situation: home    Substance Use History:   History   Alcohol Use No     Comment: socially     History   Smoking Status    Never Smoker   Smokeless Tobacco    Never Used     History   Drug Use No       Family History:    non-contributory    Physical Exam:   General: in no overt distress  HEENT: oral mucosa moist, not pale, not jaundiced  Neck: supple, no JVD  Resp: clear lungs, no crackles or wheeze  Cardiovascular: S1 S2 audible, regular  GI: soft abdomen, nontender, bowel sounds present  Musculoskeletal: no pedal edema or cyanosis  Skin: no petechiae or suspicious rash  Psych: appropriately oriented in all spheres, good insight  Neuro: Awake, alert, moves all extremities equally, essentially nonfocal      Vitals:   Blood Pressure: 115/69 (11/03/18 1446)  Pulse: 81 (11/03/18 1446)  Temperature: 97 8 °F (36 6 °C) (11/03/18 1446)  Temp Source: Temporal (11/03/18 1446)  Respirations: 18 (11/03/18 1446)  Height: 5' 3" (160 cm) (11/03/18 1446)  Weight - Scale: 65 5 kg (144 lb 6 4 oz) (11/03/18 1446)  SpO2: 100 % (11/03/18 1446)      Additional Data:     Lab Results: I have personally reviewed pertinent reports  Results from last 7 days  Lab Units 11/03/18  1248   WBC Thousand/uL 6 54   HEMOGLOBIN g/dL 14 2   HEMATOCRIT % 43 2   PLATELETS Thousands/uL 323   NEUTROS PCT % 55   LYMPHS PCT % 33   MONOS PCT % 6   EOS PCT % 5       Results from last 7 days  Lab Units 11/03/18  1248   POTASSIUM mmol/L 2 6*   CHLORIDE mmol/L 104   CO2 mmol/L 22   BUN mg/dL 17   CREATININE mg/dL 1 11   CALCIUM mg/dL 8 7   ALK PHOS U/L 51   ALT U/L 39   AST U/L 20         Invalid input(s): TROIP    Imaging: I have personally reviewed pertinent reports  Xr Chest 1 View Portable    Result Date: 10/15/2018  Narrative: CHEST INDICATION:   chest pain  Low potassium  Palpitations   COMPARISON:  July 10, 2018 EXAM PERFORMED/VIEWS:  AP semierect portable Images: 1 FINDINGS: Cardiomediastinal silhouette appears unremarkable  The lungs are clear  No pneumothorax or pleural effusion  Osseous structures appear within normal limits for patient age  Body jewelry overlies the nipples  Impression: No acute cardiopulmonary disease  Workstation performed: [id number]       EKG, Pathology, and Other Studies Reviewed on Admission:   · EKG: sinus rhythm    Allscripts Records Reviewed: Yes     ** Please Note: Dragon 360 Dictation voice to text software may have been used in the creation of this document

## 2018-11-04 LAB
ALBUMIN SERPL BCP-MCNC: 2.9 G/DL (ref 3.5–5)
ALP SERPL-CCNC: 37 U/L (ref 46–116)
ALT SERPL W P-5'-P-CCNC: 27 U/L (ref 12–78)
ANION GAP SERPL CALCULATED.3IONS-SCNC: 10 MMOL/L (ref 4–13)
ANION GAP SERPL CALCULATED.3IONS-SCNC: 8 MMOL/L (ref 4–13)
AST SERPL W P-5'-P-CCNC: 16 U/L (ref 5–45)
BILIRUB SERPL-MCNC: 0.3 MG/DL (ref 0.2–1)
BUN SERPL-MCNC: 11 MG/DL (ref 5–25)
BUN SERPL-MCNC: 13 MG/DL (ref 5–25)
CALCIUM SERPL-MCNC: 7.6 MG/DL (ref 8.3–10.1)
CALCIUM SERPL-MCNC: 7.7 MG/DL (ref 8.3–10.1)
CHLORIDE SERPL-SCNC: 111 MMOL/L (ref 100–108)
CHLORIDE SERPL-SCNC: 113 MMOL/L (ref 100–108)
CO2 SERPL-SCNC: 16 MMOL/L (ref 21–32)
CO2 SERPL-SCNC: 19 MMOL/L (ref 21–32)
CREAT SERPL-MCNC: 0.87 MG/DL (ref 0.6–1.3)
CREAT SERPL-MCNC: 0.89 MG/DL (ref 0.6–1.3)
ERYTHROCYTE [DISTWIDTH] IN BLOOD BY AUTOMATED COUNT: 15.2 % (ref 11.6–15.1)
GFR SERPL CREATININE-BSD FRML MDRD: 85 ML/MIN/1.73SQ M
GFR SERPL CREATININE-BSD FRML MDRD: 87 ML/MIN/1.73SQ M
GLUCOSE SERPL-MCNC: 116 MG/DL (ref 65–140)
GLUCOSE SERPL-MCNC: 76 MG/DL (ref 65–140)
HCT VFR BLD AUTO: 35.5 % (ref 34.8–46.1)
HGB BLD-MCNC: 11.5 G/DL (ref 11.5–15.4)
MAGNESIUM SERPL-MCNC: 2 MG/DL (ref 1.6–2.6)
MAGNESIUM SERPL-MCNC: 2.1 MG/DL (ref 1.6–2.6)
MCH RBC QN AUTO: 30.6 PG (ref 26.8–34.3)
MCHC RBC AUTO-ENTMCNC: 32.4 G/DL (ref 31.4–37.4)
MCV RBC AUTO: 94 FL (ref 82–98)
PHOSPHATE SERPL-MCNC: 3.2 MG/DL (ref 2.7–4.5)
PLATELET # BLD AUTO: 232 THOUSANDS/UL (ref 149–390)
PMV BLD AUTO: 10.6 FL (ref 8.9–12.7)
POTASSIUM SERPL-SCNC: 3 MMOL/L (ref 3.5–5.3)
POTASSIUM SERPL-SCNC: 4 MMOL/L (ref 3.5–5.3)
PROT SERPL-MCNC: 5.8 G/DL (ref 6.4–8.2)
RBC # BLD AUTO: 3.76 MILLION/UL (ref 3.81–5.12)
SODIUM SERPL-SCNC: 137 MMOL/L (ref 136–145)
SODIUM SERPL-SCNC: 140 MMOL/L (ref 136–145)
WBC # BLD AUTO: 5.01 THOUSAND/UL (ref 4.31–10.16)

## 2018-11-04 PROCEDURE — 99232 SBSQ HOSP IP/OBS MODERATE 35: CPT | Performed by: INTERNAL MEDICINE

## 2018-11-04 PROCEDURE — 93005 ELECTROCARDIOGRAM TRACING: CPT

## 2018-11-04 PROCEDURE — 85027 COMPLETE CBC AUTOMATED: CPT | Performed by: INTERNAL MEDICINE

## 2018-11-04 PROCEDURE — 80048 BASIC METABOLIC PNL TOTAL CA: CPT | Performed by: INTERNAL MEDICINE

## 2018-11-04 PROCEDURE — 84100 ASSAY OF PHOSPHORUS: CPT | Performed by: INTERNAL MEDICINE

## 2018-11-04 PROCEDURE — 83735 ASSAY OF MAGNESIUM: CPT | Performed by: INTERNAL MEDICINE

## 2018-11-04 PROCEDURE — 80053 COMPREHEN METABOLIC PANEL: CPT | Performed by: INTERNAL MEDICINE

## 2018-11-04 RX ORDER — POTASSIUM CHLORIDE 20MEQ/15ML
40 LIQUID (ML) ORAL 3 TIMES DAILY
Status: DISCONTINUED | OUTPATIENT
Start: 2018-11-04 | End: 2018-11-05

## 2018-11-04 RX ORDER — POTASSIUM CHLORIDE 14.9 MG/ML
20 INJECTION INTRAVENOUS
Status: COMPLETED | OUTPATIENT
Start: 2018-11-04 | End: 2018-11-04

## 2018-11-04 RX ORDER — POTASSIUM CHLORIDE 14.9 MG/ML
20 INJECTION INTRAVENOUS ONCE
Status: COMPLETED | OUTPATIENT
Start: 2018-11-04 | End: 2018-11-04

## 2018-11-04 RX ADMIN — MAGNESIUM OXIDE TAB 400 MG (241.3 MG ELEMENTAL MG) 400 MG: 400 (241.3 MG) TAB at 09:23

## 2018-11-04 RX ADMIN — MECLIZINE HYDROCHLORIDE 25 MG: 25 TABLET ORAL at 21:32

## 2018-11-04 RX ADMIN — FERROUS SULFATE TAB 325 MG (65 MG ELEMENTAL FE) 325 MG: 325 (65 FE) TAB at 16:09

## 2018-11-04 RX ADMIN — POTASSIUM CHLORIDE 40 MEQ: 20 SOLUTION ORAL at 09:23

## 2018-11-04 RX ADMIN — MAGNESIUM OXIDE TAB 400 MG (241.3 MG ELEMENTAL MG) 400 MG: 400 (241.3 MG) TAB at 17:31

## 2018-11-04 RX ADMIN — POTASSIUM CHLORIDE 20 MEQ: 200 INJECTION, SOLUTION INTRAVENOUS at 09:09

## 2018-11-04 RX ADMIN — Medication 100 MG: at 09:23

## 2018-11-04 RX ADMIN — POTASSIUM CHLORIDE 20 MEQ: 200 INJECTION, SOLUTION INTRAVENOUS at 13:22

## 2018-11-04 RX ADMIN — POTASSIUM CHLORIDE 40 MEQ: 20 SOLUTION ORAL at 21:32

## 2018-11-04 RX ADMIN — FERROUS SULFATE TAB 325 MG (65 MG ELEMENTAL FE) 325 MG: 325 (65 FE) TAB at 09:23

## 2018-11-04 RX ADMIN — POTASSIUM CHLORIDE 40 MEQ: 20 SOLUTION ORAL at 16:10

## 2018-11-04 RX ADMIN — AMITRIPTYLINE HYDROCHLORIDE 50 MG: 50 TABLET, FILM COATED ORAL at 21:32

## 2018-11-04 RX ADMIN — POTASSIUM CHLORIDE 20 MEQ: 200 INJECTION, SOLUTION INTRAVENOUS at 11:19

## 2018-11-04 RX ADMIN — ZINC SULFATE CAP 220 MG (50 MG ELEMENTAL ZN) 220 MG: 220 (50 ZN) CAP at 09:22

## 2018-11-04 NOTE — PROGRESS NOTES
[facility] Progress Note  Patient: Dannielle Anderson 28 y o  female   MRN: [id number]  PCP: ARIADNA Gallo  Unit/Bed#: 502-07 Encounter: [id number]  Date Of Visit: 18    Assessment:    Principal Problem:    Hypokalemia  Active Problems:    History of gastric bypass    Paresthesia of both lower extremities    WAGNER (obstructive sleep apnea)    Other intestinal malabsorption      Plan:    · Continue IV potassium chloride & BMP checks  · PO KCl appears largely ineffective due to intestinal malabsorption  · Discharge with home nursing for serum electrolyte monitoring and infusion center referral by case management for IV potassium chloride infusion, in order to prevent recurrent hospitalization with severe hypokalemia, once serum electrolytes adequately replaced      VTE Pharmacologic Prophylaxis:   Pharmacologic: Enoxaparin (Lovenox)  Mechanical VTE Prophylaxis in Place: Yes    Patient Centered Rounds: I have performed bedside rounds with nursing staff today  Current Length of Stay: 1 day(s)    Current Patient Status: Inpatient     Code Status: Level 1 - Full Code      Subjective:   Patient seen and examined this morning  Bilateral distal lower extremity paresthesias improved  Denies palpitations, lightheadedness, near syncope, chest pain or shortness of breath  She has no new complaints  Objective:     Vitals:   Temp (24hrs), Av 2 °F (36 8 °C), Min:97 6 °F (36 4 °C), Max:98 6 °F (37 °C)    Temp:  [97 6 °F (36 4 °C)-98 6 °F (37 °C)] 98 6 °F (37 °C)  HR:  [79-86] 79  Resp:  [16-18] 16  BP: ()/(49-59) 102/49  SpO2:  [98 %-100 %] 98 %  Body mass index is 26 56 kg/m²  Input and Output Summary (last 24 hours):        Intake/Output Summary (Last 24 hours) at 18 1726  Last data filed at 18 1303   Gross per 24 hour   Intake             1840 ml   Output                0 ml   Net             1840 ml       Physical Exam:   General: in no overt distress  HEENT: oral mucosa moist, not pale, not jaundiced  Chest: clear lungs, no wheeze  Heart: S1 S2 audible, regular  Abd: soft, nontender, bowel sounds present  Psych: appropriately oriented in all spheres, good insight  Neuro: Awake, alert, CN 2-12 grossly intact, essentially nonfocal      Additional Data:     Labs:      Results from last 7 days  Lab Units 11/04/18  0629 11/03/18  1248   WBC Thousand/uL 5 01 6 54   HEMOGLOBIN g/dL 11 5 14 2   HEMATOCRIT % 35 5 43 2   PLATELETS Thousands/uL 232 323   NEUTROS PCT %  --  55   LYMPHS PCT %  --  33   MONOS PCT %  --  6   EOS PCT %  --  5       Results from last 7 days  Lab Units 11/04/18  0629   POTASSIUM mmol/L 3 0*   CHLORIDE mmol/L 113*   CO2 mmol/L 19*   BUN mg/dL 13   CREATININE mg/dL 0 89   CALCIUM mg/dL 7 7*   ALK PHOS U/L 37*   ALT U/L 27   AST U/L 16         Invalid input(s): TROIP    * I Have Reviewed All Lab Data Listed Above  * Additional Pertinent Lab Tests Reviewed: [facility]      Recent Cultures (last 7 days):           Last 24 Hours Medication List:     Current Facility-Administered Medications:  acetaminophen 650 mg Oral Q6H PRN Sal Holt MD   amitriptyline 50 mg Oral HS Sal Holt MD   enoxaparin 40 mg Subcutaneous Daily Sal Holt MD   [START ON 11/5/2018] ergocalciferol 50,000 Units Oral Weekly Sal Holt MD   ferrous sulfate 325 mg Oral BID With Meals Sal Holt MD   magnesium oxide 400 mg Oral BID Sal Holt MD   meclizine 25 mg Oral HS Sal Holt MD   potassium chloride 40 mEq Oral TID Sal Holt MD   thiamine 100 mg Oral Daily Sal Holt MD   zinc sulfate 220 mg Oral Daily Sal Holt MD        Today, Patient Was Seen By: Sal Holt MD    ** Please Note: Dragon 360 Dictation voice to text software may have been used in the creation of this document   **

## 2018-11-04 NOTE — SOCIAL WORK
Cm met with the patient to evaluate the patients prior function and living situation and any barriers to d/c and form a safe d/c plan  Cm also evaluated the patient for any services in the home or needs for services  Pt resides at home with her  in a house in [facility]  Single level home, 0 CARLOS  Pt is independent with her adls and ambulation  No services or DME  Pt and spouse both drive  PCP is Courtney Blanca and pharmacy is [facility] in [facility]  Pt is a HRR  Was in the works to have GI see pt and coordinate standing orders for pt to have labs drawn and come to the infusion center here to try and prevent frequent hospitalizations  The day pt was to come in to see GI as an OP she received a call from their office stating they did not take her insurance  Pt inquiring if Dr Amor Hodges can help try and coordinate her coming in to the infusion center and lab draws (either CM or hospitalist will discuss on Monday with Dr Amor Hodges)

## 2018-11-04 NOTE — PLAN OF CARE
Problem: Potential for Falls  Goal: Patient will remain free of falls  INTERVENTIONS:  - Assess patient frequently for physical needs  -  Identify cognitive and physical deficits and behaviors that affect risk of falls  -  [place] fall precautions as indicated by assessment   Medium fall risk    - Educate patient/family on patient safety including physical limitations  - Instruct patient to call for assistance with activity based on assessment  - Modify environment to reduce risk of injury   Outcome: Progressing      Problem: METABOLIC, FLUID AND ELECTROLYTES - ADULT  Goal: Electrolytes maintained within normal limits  INTERVENTIONS:  - Monitor labs and assess patient for signs and symptoms of electrolyte imbalances (hypokalemia)  - Administer electrolyte replacement as ordered  - Monitor response to electrolyte replacements, including repeat lab results as appropriate  - Instruct patient on fluid and nutrition as appropriate    Outcome: Progressing    Goal: Fluid balance maintained  INTERVENTIONS:  - Monitor labs and assess for signs and symptoms of volume excess or deficit  - Monitor I/O and WT  - Instruct patient on fluid and nutrition as appropriate   Outcome: Progressing      Problem: PAIN - ADULT  Goal: Verbalizes/displays adequate comfort level or baseline comfort level  Interventions:  - Encourage patient to monitor pain and request assistance  - Assess pain using appropriate pain scale (0-10 pain scale)  - Administer analgesics based on type and severity of pain and evaluate response  - Implement non-pharmacological measures as appropriate and evaluate response  - Notify physician/advanced practitioner if interventions unsuccessful or patient reports new pain    Outcome: Progressing      Problem: INFECTION - ADULT  Goal: Absence or prevention of progression during hospitalization  INTERVENTIONS:  - Assess and monitor for signs and symptoms of infection  - Monitor lab/diagnostic results  - Monitor all insertion sites, i e  indwelling lines  - [place] appropriate cooling/warming therapies per order  - Administer medications as ordered  - Instruct and encourage patient and family to use good hand hygiene technique   Outcome: Progressing      Problem: SAFETY ADULT  Goal: Maintain or return to baseline ADL function  INTERVENTIONS:  -  Assess patient's ability to carry out ADLs; assess patient's baseline for ADL function and identify physical deficits which impact ability to perform ADLs (bathing, care of mouth/teeth, toileting, grooming, dressing, etc )  - Assess/evaluate cause of self-care deficits   - Assess range of motion  - Assess patient's mobility; develop plan if impaired  - Encourage maximum independence but intervene and supervise when necessary  ¯ Involve family in performance of ADLs  ¯ Assess for home care needs following discharge   ¯ Request OT consult to assist with ADL evaluation and planning for discharge  ¯ Provide patient education as appropriate    Outcome: Progressing    Goal: Maintain or return mobility status to optimal level  INTERVENTIONS:  - Assess patient's baseline mobility status (independent)  - Identify cognitive and physical deficits and behaviors that affect mobility  - [place] fall precautions as indicated by assessment   Medium fall risk    - Record patient progress and toleration of activity level on Mobility SBAR; progress patient to next Phase/Stage  - Instruct patient to call for assistance with activity based on assessment   Outcome: Progressing      Problem: DISCHARGE PLANNING  Goal: Discharge to home or other facility with appropriate resources  INTERVENTIONS:  - Arrange for needed discharge resources and transportation as appropriate  - Identify discharge learning needs (meds)  - Refer to Case Management Department for coordinating discharge planning if the patient needs post-hospital services based on physician/advanced practitioner order or complex needs related to functional status, cognitive ability, or social support system    Outcome: Progressing      Problem: Knowledge Deficit  Goal: Patient/family/caregiver demonstrates understanding of disease process, treatment plan, medications, and discharge instructions  Complete learning assessment and assess knowledge base    Interventions:  - Provide teaching at level of understanding  - Provide teaching via preferred learning methods (verbal, instruction, demonstrations)   Outcome: Progressing      Problem: DISCHARGE PLANNING - CARE MANAGEMENT  Goal: Discharge to post-acute care or home with appropriate resources  INTERVENTIONS:  - Conduct assessment to determine patient/family and health care team treatment goals, and need for post-acute services based on payer coverage, community resources, and patient preferences, and barriers to discharge  - Address psychosocial, clinical, and financial barriers to discharge as identified in assessment in conjunction with the patient/family and health care team  - Arrange appropriate level of post-acute services according to patient's   needs and preference and payer coverage in collaboration with the physician and health care team  - Communicate with and update the patient/family, physician, and health care team regarding progress on the discharge plan  - Arrange appropriate transportation to post-acute venues   Outcome: Progressing

## 2018-11-05 LAB
ANION GAP SERPL CALCULATED.3IONS-SCNC: 7 MMOL/L (ref 4–13)
ANION GAP SERPL CALCULATED.3IONS-SCNC: 9 MMOL/L (ref 4–13)
ATRIAL RATE: 77 BPM
ATRIAL RATE: 91 BPM
BUN SERPL-MCNC: 11 MG/DL (ref 5–25)
BUN SERPL-MCNC: 7 MG/DL (ref 5–25)
CALCIUM SERPL-MCNC: 7.8 MG/DL (ref 8.3–10.1)
CALCIUM SERPL-MCNC: 8.2 MG/DL (ref 8.3–10.1)
CHLORIDE SERPL-SCNC: 110 MMOL/L (ref 100–108)
CHLORIDE SERPL-SCNC: 112 MMOL/L (ref 100–108)
CO2 SERPL-SCNC: 18 MMOL/L (ref 21–32)
CO2 SERPL-SCNC: 23 MMOL/L (ref 21–32)
CREAT SERPL-MCNC: 0.8 MG/DL (ref 0.6–1.3)
CREAT SERPL-MCNC: 0.92 MG/DL (ref 0.6–1.3)
GFR SERPL CREATININE-BSD FRML MDRD: 81 ML/MIN/1.73SQ M
GFR SERPL CREATININE-BSD FRML MDRD: 96 ML/MIN/1.73SQ M
GLUCOSE SERPL-MCNC: 80 MG/DL (ref 65–140)
GLUCOSE SERPL-MCNC: 91 MG/DL (ref 65–140)
P AXIS: 12 DEGREES
P AXIS: 12 DEGREES
POTASSIUM SERPL-SCNC: 3.2 MMOL/L (ref 3.5–5.3)
POTASSIUM SERPL-SCNC: 3.8 MMOL/L (ref 3.5–5.3)
PR INTERVAL: 108 MS
PR INTERVAL: 120 MS
QRS AXIS: 26 DEGREES
QRS AXIS: 33 DEGREES
QRSD INTERVAL: 94 MS
QRSD INTERVAL: 96 MS
QT INTERVAL: 352 MS
QT INTERVAL: 390 MS
QTC INTERVAL: 432 MS
QTC INTERVAL: 441 MS
SODIUM SERPL-SCNC: 139 MMOL/L (ref 136–145)
SODIUM SERPL-SCNC: 140 MMOL/L (ref 136–145)
T WAVE AXIS: 35 DEGREES
T WAVE AXIS: 44 DEGREES
VENTRICULAR RATE: 77 BPM
VENTRICULAR RATE: 91 BPM

## 2018-11-05 PROCEDURE — 99232 SBSQ HOSP IP/OBS MODERATE 35: CPT | Performed by: INTERNAL MEDICINE

## 2018-11-05 PROCEDURE — 80048 BASIC METABOLIC PNL TOTAL CA: CPT | Performed by: INTERNAL MEDICINE

## 2018-11-05 PROCEDURE — 93010 ELECTROCARDIOGRAM REPORT: CPT | Performed by: INTERNAL MEDICINE

## 2018-11-05 RX ORDER — POTASSIUM CHLORIDE 14.9 MG/ML
20 INJECTION INTRAVENOUS
Status: COMPLETED | OUTPATIENT
Start: 2018-11-05 | End: 2018-11-06

## 2018-11-05 RX ORDER — POTASSIUM CHLORIDE 20MEQ/15ML
40 LIQUID (ML) ORAL 4 TIMES DAILY
Status: DISCONTINUED | OUTPATIENT
Start: 2018-11-05 | End: 2018-11-06 | Stop reason: HOSPADM

## 2018-11-05 RX ORDER — POTASSIUM CHLORIDE 14.9 MG/ML
20 INJECTION INTRAVENOUS
Status: COMPLETED | OUTPATIENT
Start: 2018-11-05 | End: 2018-11-05

## 2018-11-05 RX ORDER — SPIRONOLACTONE 25 MG/1
50 TABLET ORAL DAILY
Status: DISCONTINUED | OUTPATIENT
Start: 2018-11-05 | End: 2018-11-06 | Stop reason: HOSPADM

## 2018-11-05 RX ORDER — POTASSIUM CHLORIDE 14.9 MG/ML
20 INJECTION INTRAVENOUS
Status: DISCONTINUED | OUTPATIENT
Start: 2018-11-05 | End: 2018-11-05

## 2018-11-05 RX ADMIN — POTASSIUM CHLORIDE 20 MEQ: 200 INJECTION, SOLUTION INTRAVENOUS at 18:34

## 2018-11-05 RX ADMIN — FERROUS SULFATE TAB 325 MG (65 MG ELEMENTAL FE) 325 MG: 325 (65 FE) TAB at 16:10

## 2018-11-05 RX ADMIN — MAGNESIUM OXIDE TAB 400 MG (241.3 MG ELEMENTAL MG) 400 MG: 400 (241.3 MG) TAB at 18:33

## 2018-11-05 RX ADMIN — MAGNESIUM OXIDE TAB 400 MG (241.3 MG ELEMENTAL MG) 400 MG: 400 (241.3 MG) TAB at 09:56

## 2018-11-05 RX ADMIN — MECLIZINE HYDROCHLORIDE 25 MG: 25 TABLET ORAL at 21:50

## 2018-11-05 RX ADMIN — Medication 100 MG: at 09:56

## 2018-11-05 RX ADMIN — AMITRIPTYLINE HYDROCHLORIDE 50 MG: 50 TABLET, FILM COATED ORAL at 21:50

## 2018-11-05 RX ADMIN — POTASSIUM CHLORIDE 20 MEQ: 200 INJECTION, SOLUTION INTRAVENOUS at 09:56

## 2018-11-05 RX ADMIN — POTASSIUM CHLORIDE 40 MEQ: 20 SOLUTION ORAL at 18:33

## 2018-11-05 RX ADMIN — POTASSIUM CHLORIDE 40 MEQ: 20 SOLUTION ORAL at 10:03

## 2018-11-05 RX ADMIN — POTASSIUM CHLORIDE 20 MEQ: 200 INJECTION, SOLUTION INTRAVENOUS at 12:20

## 2018-11-05 RX ADMIN — SPIRONOLACTONE 50 MG: 25 TABLET, FILM COATED ORAL at 09:56

## 2018-11-05 RX ADMIN — POTASSIUM CHLORIDE 20 MEQ: 200 INJECTION, SOLUTION INTRAVENOUS at 20:44

## 2018-11-05 RX ADMIN — ZINC SULFATE CAP 220 MG (50 MG ELEMENTAL ZN) 220 MG: 220 (50 ZN) CAP at 09:57

## 2018-11-05 RX ADMIN — POTASSIUM CHLORIDE 40 MEQ: 20 SOLUTION ORAL at 21:50

## 2018-11-05 RX ADMIN — POTASSIUM CHLORIDE 40 MEQ: 20 SOLUTION ORAL at 12:28

## 2018-11-05 RX ADMIN — POTASSIUM CHLORIDE 20 MEQ: 200 INJECTION, SOLUTION INTRAVENOUS at 14:44

## 2018-11-05 RX ADMIN — ACETAMINOPHEN 650 MG: 325 TABLET, FILM COATED ORAL at 20:35

## 2018-11-05 RX ADMIN — FERROUS SULFATE TAB 325 MG (65 MG ELEMENTAL FE) 325 MG: 325 (65 FE) TAB at 07:28

## 2018-11-05 NOTE — UTILIZATION REVIEW
[address]  Utilization Review Department  Phone: [phone]; Fax [phone]  ATTENTION: Please call with any questions or concerns to [phone]  and carefully listen to the prompts so that you are directed to the right person  Send all requests for admission clinical reviews, approved or denied determinations and any other requests to fax [phone]  All voicemails are confidential       Initial Clinical Review    Admission: Date/Time/Statement: 11/3/18 @ 1359     Orders Placed This Encounter   Procedures    Inpatient Admission     Standing Status:   Standing     Number of Occurrences:   1     Order Specific Question:   Admitting Physician     Answer:   Steven Ma [[id number]]     Order Specific Question:   Level of Care     Answer:   Med Surg [16]     Order Specific Question:   Estimated length of stay     Answer:   More than 2 Midnights     Order Specific Question:   Certification     Answer:   I certify that inpatient services are medically necessary for this patient for a duration of greater than two midnights  See H&P and MD Progress Notes for additional information about the patient's course of treatment  ED: Date/Time/Mode of Arrival:   ED Arrival Information     Expected Arrival Acuity Means of Arrival Escorted By Service Admission Type    - 11/3/2018 12:26 Emergent Walk-In [facility] General Medicine Emergency    Arrival Complaint    ABNORMAL LAB          Chief Complaint:   Chief Complaint   Patient presents with    Abnormal Lab     taking 260meq of potassium a day  states she had issues with this for a while had blood work thursday and it was 3 3  states she feels her hands and feet going numb when her potassium drops       History of Illness: 28 y o  female who has had multiple hospital admissions at [address] [place] [address] in past few months due to recurrent severe hypokalemia that typically manifests with B/L distal lower extremity paresthesias   This has been attributed to 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  She denies any recent diarrhea, nausea or vomiting  She usually takes KCl liquid supplements but despite increased frequency past 2-3 days, she continued to experience worsening tingling to both feet & later on hands  She sought medical attention at this hospital's ED today due to suspected severe hypokalemia, with serum K returning at 2 6  She has received IV KCl supplementation  ED Vital Signs:   ED Triage Vitals [11/03/18 1233]   Temperature Pulse Respirations Blood Pressure SpO2   98 °F (36 7 °C) 98 18 126/56 100 %      Temp Source Heart Rate Source Patient Position - Orthostatic VS BP Location FiO2 (%)   Temporal Monitor Sitting Left arm --      Pain Score       No Pain        Wt Readings from Last 1 Encounters:   11/05/18 70 1 kg (154 lb 8 7 oz)       Vital Signs (abnormal): /49    Abnormal Labs/Diagnostic Test Results:    Ref Range & Units    11/5/18 0631 11/4/18 1830 11/4/18 0629 11/3/18 1959 11/3/18 1248   Sodium 136 - 145 mmol/L 140  137  140  140  139    Potassium 3 5 - 5 3 mmol/L 3 2   4  0CM  3 0   3 7  2 6     Chloride 100 - 108 mmol/L 110   111   113   111   104    CO2 21 - 32 mmol/L 23  16   19   20   22    ANION GAP 4 - 13 mmol/L 7  10  8  9  13    BUN 5 - 25 mg/dL 7  11  13  17  17    Creatinine 0 60 - 1 30 mg/dL 0 80  0 87CM  0 89CM  0 93CM  1  11CM    Glucose 65 - 140 mg/dL 80  116CM  76CM  63CM   88CM    Calcium 8 3 - 10 1 mg/dL 8 2   7 6   7 7   8 3  8 7        ED Treatment:   Medication Administration from 11/03/2018 1226 to 11/03/2018 1437       Date/Time Order Dose Route Action     11/03/2018 1326 potassium chloride 20 mEq IVPB (premix) 40 mEq Intravenous New Bag     11/03/2018 1326 sodium chloride 0 9 % bolus 1,000 mL 1,000 mL Intravenous New Bag       Past Medical/Surgical History:    Active Ambulatory Problems     Diagnosis Date Noted    Hypokalemia 07/07/2018    History of gastric bypass 07/07/2018    Migraine without aura and without status migrainosus, not intractable 07/07/2018    Left-sided weakness 07/07/2018    Hypophosphatemia 07/09/2018    Anemia 07/10/2018    Vitamin D deficiency 07/10/2018    Weakness of both lower extremities 07/11/2018    Weakness of both upper extremities 07/11/2018    Elevated CPK 07/11/2018    Vitamin B12 deficiency 07/11/2018    Cervical lymphadenopathy 07/11/2018    Generalized weakness 07/12/2018    Paresthesia of both lower extremities 08/15/2018    Paresthesias 08/15/2018    B12 deficiency 01/17/2016    Gastric bypass status for obesity 10/02/2018    GERD (gastroesophageal reflux disease) 09/03/2018    Migraine 05/29/2014    WAGNER (obstructive sleep apnea) 06/11/2014    Other intestinal malabsorption 10/05/2018     Past Medical History:   Diagnosis Date    Hypokalemia     Migraine        Admitting Diagnosis: Hypokalemia [E87 6]  Abnormal laboratory test result [R89 9]  Paresthesia of both feet [R20 2]  Paresthesia of both hands [R20 2]    Age/Sex: 28 y o  female    Assessment/Plan:   Active Problems:    Hypokalemia    History of gastric bypass    Paresthesia of both lower extremities    WAGNER (obstructive sleep apnea)    Other intestinal malabsorption    Severe hypokalemia     Plan for the Primary Problem(s):  · Admit to telemetry, seizure precautions, IV KCl supplements to achieve serum level 4 or >  ?  Serial BMP checks     Plan for Additional Problems:   · Case management consult re outpatient BMP checks q2-4 weeks & once symptomatic with Infusion Center IV KCl administration as needed to prevent hospital readmission       Admission Orders:  Scheduled Meds:   acetaminophen 650 mg Oral Q6H PRN   amitriptyline 50 mg Oral HS   enoxaparin 40 mg Subcutaneous Daily   ergocalciferol 50,000 Units Oral Weekly   ferrous sulfate 325 mg Oral BID With Meals   magnesium oxide 400 mg Oral BID   meclizine 25 mg Oral HS   potassium chloride 40 mEq Oral 4x Daily   potassium chloride 20 mEq Intravenous Q2H   spironolactone 50 mg Oral Daily   thiamine 100 mg Oral Daily   zinc sulfate 220 mg Oral Daily     Telem  Seizure precautions  Reg diet  I&O's  Daily wts  Repeat labs  SCD's  Up as mitra  Consult nephrology

## 2018-11-05 NOTE — PROGRESS NOTES
[facility] Progress Note  Patient: Peg Kimbrough 28 y o  female   MRN: [id number]  PCP: ARIADNA Juan  Unit/Bed#: 237-31 Encounter: [id number]  Date Of Visit: 18    Assessment:    Principal Problem:    Hypokalemia  Active Problems:    History of gastric bypass    Paresthesia of both lower extremities    WAGNER (obstructive sleep apnea)    Other intestinal malabsorption      Plan:    · Cont IV KCl overnight to achieve serum K in the upper 4 range  · Discharge tomorrow AM if no recurrence of hypokalemia, to follow-up with Dr Ghada Koch as scheduled this Fri with a BMP check  · Cont PO KCl liquid at discharge      VTE Pharmacologic Prophylaxis:   Pharmacologic: Enoxaparin (Lovenox)  Mechanical VTE Prophylaxis in Place: Yes    Patient Centered Rounds: I have performed bedside rounds with nursing staff today  Current Length of Stay: 2 day(s)    Current Patient Status: Inpatient     Code Status: Level 1 - Full Code      Subjective:   Patient seen and examined this afternoon  Still experiencing distal bilateral lower extremity and upper extremity tingling, although less severe  Otherwise feels well with no other complaint  Objective:     Vitals:   Temp (24hrs), Av 3 °F (36 8 °C), Min:98 1 °F (36 7 °C), Max:98 6 °F (37 °C)    Temp:  [98 1 °F (36 7 °C)-98 6 °F (37 °C)] 98 6 °F (37 °C)  HR:  [78-86] 86  Resp:  [17-18] 18  BP: (105-111)/(53-54) 105/54  SpO2:  [100 %] 100 %  Body mass index is 27 38 kg/m²  Input and Output Summary (last 24 hours):        Intake/Output Summary (Last 24 hours) at 18 1737  Last data filed at 18 1206   Gross per 24 hour   Intake              660 ml   Output                0 ml   Net              660 ml       Physical Exam:   General: in no overt distress  HEENT: oral mucosa moist, not pale, not jaundiced  Psych: appropriately oriented in all spheres, good insight  Neuro: Awake, alert, moves all extremities equally, essentially nonfocal      Additional Data:     Labs:      Results from last 7 days  Lab Units 11/04/18  0629 11/03/18  1248   WBC Thousand/uL 5 01 6 54   HEMOGLOBIN g/dL 11 5 14 2   HEMATOCRIT % 35 5 43 2   PLATELETS Thousands/uL 232 323   NEUTROS PCT %  --  55   LYMPHS PCT %  --  33   MONOS PCT %  --  6   EOS PCT %  --  5       Results from last 7 days  Lab Units 11/05/18  0631  11/04/18  0629   POTASSIUM mmol/L 3 2*  < > 3 0*   CHLORIDE mmol/L 110*  < > 113*   CO2 mmol/L 23  < > 19*   BUN mg/dL 7  < > 13   CREATININE mg/dL 0 80  < > 0 89   CALCIUM mg/dL 8 2*  < > 7 7*   ALK PHOS U/L  --   --  37*   ALT U/L  --   --  27   AST U/L  --   --  16   < > = values in this interval not displayed  Invalid input(s): TROIP    * I Have Reviewed All Lab Data Listed Above  * Additional Pertinent Lab Tests Reviewed: All Labs Within Last 24 Hours Reviewed      Recent Cultures (last 7 days):           Last 24 Hours Medication List:     Current Facility-Administered Medications:  acetaminophen 650 mg Oral Q6H PRN Tal Thompson MD   amitriptyline 50 mg Oral HS Tal Thompson MD   enoxaparin 40 mg Subcutaneous Daily Tal Thompson MD   ergocalciferol 50,000 Units Oral Weekly Tal Thompson MD   ferrous sulfate 325 mg Oral BID With Meals Tal Thompson MD   magnesium oxide 400 mg Oral BID Tal Thompson MD   meclizine 25 mg Oral HS Tal Thompson MD   potassium chloride 40 mEq Oral 4x Daily Juan Carlos Ricketts DO   potassium chloride 20 mEq Intravenous Q2H Tal Thompson MD   spironolactone 50 mg Oral Daily Holt Party, DO   thiamine 100 mg Oral Daily Tal Thompson MD   zinc sulfate 220 mg Oral Daily Tal Thompson MD        Today, Patient Was Seen By: Tal Thompson MD    ** Please Note: Dragon 360 Dictation voice to text software may have been used in the creation of this document   **

## 2018-11-05 NOTE — SOCIAL WORK
Per attending pt to be Discharged with home nursing for serum electrolyte monitoring and infusion center referral by case management for IV potassium chloride infusion, in order to prevent recurrent hospitalization with severe hypokalemia, once serum electrolytes adequately replaced  Rui spoke with Dr Ricketts:Nephrology who will be taking the lead with this and pt will actually be seeing him this Friday  Dr Juan Maradiaga will be ordering routine lab work on pt and standing orders for infusion center based on labs for IV potassium chloride infusion  Cm notified the infusion center:[facility] who will be reaching out to Dr Juan Maradiaga to discuss pt and how to proceed

## 2018-11-05 NOTE — CONSULTS
Consultation - Nephrology   Erin Tyler 28 y o  female MRN: [id number]  Unit/Bed#: 401-01 Encounter: [id number]      A/P:  1  Hypokalemia   As mentioned in HPI, patient has had a full gastroenterology workup and has been without for malabsorption  That being said in the past, the patient improved significantly from going from the tablets to the liquid potassium  At the time the GI workup to place, the patient was on the liquid potassium supplementation  From the renal standpoint, patient had potassium in her urine at presentation in the setting of a serum potassium of 2 6 millimole/L  Potassium should have been undetectable  Patient was on her spironolactone 25 mg daily at that time  I will increase the patient's spironolactone 50 mg p o  Daily  Patient is currently on 120 mEq of oral potassium supplementation, I will increase that back up to 160 mEq over the course of the day is this is her typical outpatient regimen  Just prior to presentation, the patient was on 240 mEq over the course of the day  From the renal standpoint, it is very difficult to attribute over 240 mEq of potassium leaving the body with a urine potassium level 14 millimole/L in the setting of a urine creatinine of 210 mg/dL  As mentioned in previous consultations, the trans tubular gradient is no longer in use as discussed in prior papers by Dr Edu Wakefield of drawn to [place] who was in fact the creator of the Potassium trans tubular gradient equation  With respect outpatient follow-up, we will see the patient this Friday November 9th, we asked the patient have blood work done on Wednesday November 7th  We will try to arrange for the patient have easy access to both blood work as well as to IV potassium infusions in the infusion center in order to try to medicate future admissions for low potassium readings      2  History of hypophosphatemia   Patient's phosphorus levels have been appropriate and prior admission I will do a workup to rule out Fanconi syndrome which did come back negative  Patient had no abnormal levels of  amino acids detectable in her urine  3  Hypomagnesemia   Continue with IV and oral supplementation as indicated  4  Status post gastric bypass surgery  5  Acidosis   This has been due to respiratory alkalosis in the past, I would not check an ABG to confirm this  This is not due to a renal tubular acidosis  Thank you for allowing us to participate in the care of your patient  Please feel free to contact us regarding the care of this patient, or any other questions/concerns that may be applicable  Patient Active Problem List   Diagnosis    Hypokalemia    History of gastric bypass    Migraine without aura and without status migrainosus, not intractable    Left-sided weakness    Hypophosphatemia    Anemia    Vitamin D deficiency    Weakness of both lower extremities    Weakness of both upper extremities    Elevated CPK    Vitamin B12 deficiency    Cervical lymphadenopathy    Generalized weakness    Paresthesia of both lower extremities    Paresthesias    B12 deficiency    Gastric bypass status for obesity    GERD (gastroesophageal reflux disease)    Migraine    WAGNER (obstructive sleep apnea)    Other intestinal malabsorption       History of Present Illness   Physician Requesting Consult: Luci Brar MD  Reason for Consult / Principal Problem: Hypokalemia  Hx and PE limited by:   HPI: Randi Aleman is a 28y o  year old female who presented to the emergency department on November 3rd with complaints of a low potassium  Patient has a history of low potassium and has had multiple workups in the past in the [address] [place] [address]  More recently, the patient was given a medically induced constipation the in order to rule out GI loss as a cause of the low potassium    In addition, she informed me that stool samples were gathered and the gastroenterologist noted that the potassium content of the stools was low normal   From the renal standpoint, on her last admission here at [facility] [facility], we started the patient spironolactone  This appears to have improved her abilities stay in the outpatient setting as an entire month point by in between inpatient admissions  She does have a small amount of potassium in her urine, with a urine creatinine level around 200 mg/dL the patient had a potassium level of 14 millimole/L  While this is a small amount, and a normal kidney in the setting of a decreased serum some potassium level the urine potassium should be undetectable  So there is in fact some renal loss, although it is small  Since admission, and prior to admission, patient denies nausea vomiting diarrhea, she has been taking all her medications as prescribed  In fact the patient had noted that her oral potassium intake had increased up to 240 mEq over the course of the day which is above and beyond her typical 180 mEq p o  Daily  Despite this, potassium continued to decline which is upon her presentation  Note simply from the VI standpoint, this is very difficult to explain simply from renal losses  It is very difficult to lose over 240 mEq of potassium a day in her urine  That being said, her the patient has been purposely increasing her oral intake of water which may be contributing to the loss of potassium  Since admission, the patient's potassium has been improved, though this morning is a little bit decreased  IV potassium as been ordered which the patient has been responding to  The patient's magnesium is also noted to be slightly decreased, this has also occurred on occasion in the past, and this also has been repleted in the IV as well as oral route  History obtained from chart review and the patient    Review of Systems - Negative except as mentioned above in HPI, more specifics as mentioned below    Review of Systems - General ROS: positive for  - fatigue and malaise  Psychological ROS: negative  Ophthalmic ROS: negative  ENT ROS: negative  Allergy and Immunology ROS: negative  Hematological and Lymphatic ROS: negative  Endocrine ROS: negative  Respiratory ROS: no cough, shortness of breath, or wheezing  Cardiovascular ROS: no chest pain or dyspnea on exertion  Gastrointestinal ROS: no abdominal pain, change in bowel habits, or black or bloody stools  Genito-Urinary ROS: no dysuria, trouble voiding, or hematuria  Musculoskeletal ROS: positive for - muscular weakness  Neurological ROS: positive for - numbness/tingling and weakness  Dermatological ROS: negative    Historical Information   Past Medical History:   Diagnosis Date    Hypokalemia     Migraine      Past Surgical History:   Procedure Laterality Date    ABDOMINAL SURGERY      APPENDECTOMY      CHOLECYSTECTOMY      GASTRIC BYPASS      HYSTERECTOMY       Social History   History   Alcohol Use No     Comment: socially     History   Drug Use No     History   Smoking Status    Never Smoker   Smokeless Tobacco    Never Used     Family History   Problem Relation Age of Onset    Hypertension Father     Diabetes Father     Diabetes Maternal Grandfather        Meds/Allergies   all current active meds have been reviewed, current meds: Current Facility-Administered Medications   Medication Dose Route Frequency    acetaminophen (TYLENOL) tablet 650 mg  650 mg Oral Q6H PRN    amitriptyline (ELAVIL) tablet 50 mg  50 mg Oral HS    enoxaparin (LOVENOX) subcutaneous injection 40 mg  40 mg Subcutaneous Daily    ergocalciferol (VITAMIN D2) capsule 50,000 Units  50,000 Units Oral Weekly    ferrous sulfate tablet 325 mg  325 mg Oral BID With Meals    magnesium oxide (MAG-OX) tablet 400 mg  400 mg Oral BID    meclizine (ANTIVERT) tablet 25 mg  25 mg Oral HS    potassium chloride 10 % oral solution 40 mEq  40 mEq Oral 4x Daily    potassium chloride 20 mEq IVPB (premix)  20 mEq Intravenous Q2H    spironolactone (ALDACTONE) tablet 50 mg  50 mg Oral Daily    thiamine (VITAMIN B1) tablet 100 mg  100 mg Oral Daily    zinc sulfate (ZINCATE) capsule 220 mg  220 mg Oral Daily    and PTA meds:  Prescriptions Prior to Admission   Medication    amitriptyline (ELAVIL) 50 mg tablet    b complex-C-folic acid (NEPHRO-EDIE) 0 8 mg tablet    ergocalciferol (ERGOCALCIFEROL) 85443 units capsule    ferrous sulfate 325 (65 Fe) mg tablet    meclizine (ANTIVERT) 25 mg tablet    potassium chloride 10 %    spironolactone (ALDACTONE) 25 mg tablet    thiamine 100 MG tablet    zinc gluconate 50 mg tablet         Allergies   Allergen Reactions    Nsaids      Other reaction(s): Other (Please comment)  Should not take s/p bariatric surgery       Objective     Intake/Output Summary (Last 24 hours) at 11/05/18 0843  Last data filed at 11/05/18 0812   Gross per 24 hour   Intake             1320 ml   Output                0 ml   Net             1320 ml       Invasive Devices:        Physical Exam      I/O last 3 completed shifts: In: 2140 [P O :1140; I V :1000]  Out: -     Vitals:    11/05/18 0708   BP: 111/54   Pulse: 83   Resp: 18   Temp: 98 1 °F (36 7 °C)   SpO2: 100%       Gen: in NAD, Alert/Awake  HEENT: no sclerous icterus, MMM, neck supple  CV: +S1/S2, RRR  Lungs: CTA bilaterally  Abd: +BS, soft NT/ND  Ext: all four extremities are warm  Skin: no rashes noted  Neuro: CN II-XII intact    Current Weight: Weight - Scale: 70 1 kg (154 lb 8 7 oz)  First Weight: Weight - Scale: 68 5 kg (151 lb 0 2 oz)    Lab Results:  I have personally reviewed pertinent labs      CBC: No results found for: WBC, HGB, HCT, MCV, PLT, ADJUSTEDWBC, MCH, MCHC, RDW, MPV, NRBC  CMP: Lab Results   Component Value Date    K 3 2 (L) 11/05/2018     (H) 11/05/2018    CO2 23 11/05/2018    BUN 7 11/05/2018    CREATININE 0 80 11/05/2018    CALCIUM 8 2 (L) 11/05/2018    EGFR 96 11/05/2018     Phosphorus: No results found for: PHOS  Magnesium:   Lab Results   Component Value Date    MG 2 1 11/04/2018     Urinalysis: No results found for: Andressa Clarice, SPECGRAV, PHUR, LEUKOCYTESUR, NITRITE, PROTEINUA, GLUCOSEU, KETONESU, BILIRUBINUR, BLOODU  Ionized Calcium: No results found for: CAION  Coagulation: No results found for: PT, INR, APTT  Troponin: No results found for: TROPONINI  ABG: No results found for: PHART, XZW1MYZ, PO2ART, FTI8POV, W8OUGXYQ, BEART, SOURCE      Results from last 7 days  Lab Units 11/05/18  0631 11/04/18  1830 11/04/18  0629  11/03/18  1248   POTASSIUM mmol/L 3 2* 4 0 3 0*  < > 2 6*   CHLORIDE mmol/L 110* 111* 113*  < > 104   CO2 mmol/L 23 16* 19*  < > 22   BUN mg/dL 7 11 13  < > 17   CREATININE mg/dL 0 80 0 87 0 89  < > 1 11   CALCIUM mg/dL 8 2* 7 6* 7 7*  < > 8 7   ALK PHOS U/L  --   --  37*  --  51   ALT U/L  --   --  27  --  39   AST U/L  --   --  16  --  20   < > = values in this interval not displayed  Radiology review:  No results found  Counseling / Coordination of Care  Total ADDITIONAL floor / unit time spent today 15 minutes  Greater than 50% of total time was spent with the patient and / or family counseling and / or coordination of care  A description of the counseling / coordination of care: Outpatient follow-up, review of prior admissions and workup achieved those times      Myriam Samuels DO

## 2018-11-06 VITALS
SYSTOLIC BLOOD PRESSURE: 98 MMHG | HEIGHT: 63 IN | RESPIRATION RATE: 18 BRPM | WEIGHT: 158.95 LBS | DIASTOLIC BLOOD PRESSURE: 53 MMHG | BODY MASS INDEX: 28.16 KG/M2 | TEMPERATURE: 97.9 F | OXYGEN SATURATION: 100 % | HEART RATE: 76 BPM

## 2018-11-06 LAB
ALBUMIN SERPL BCP-MCNC: 3 G/DL (ref 3.5–5)
ALP SERPL-CCNC: 37 U/L (ref 46–116)
ALT SERPL W P-5'-P-CCNC: 30 U/L (ref 12–78)
ANION GAP SERPL CALCULATED.3IONS-SCNC: 9 MMOL/L (ref 4–13)
AST SERPL W P-5'-P-CCNC: 21 U/L (ref 5–45)
BILIRUB SERPL-MCNC: 0.2 MG/DL (ref 0.2–1)
BUN SERPL-MCNC: 7 MG/DL (ref 5–25)
CALCIUM SERPL-MCNC: 8 MG/DL (ref 8.3–10.1)
CHLORIDE SERPL-SCNC: 113 MMOL/L (ref 100–108)
CO2 SERPL-SCNC: 19 MMOL/L (ref 21–32)
CREAT SERPL-MCNC: 0.78 MG/DL (ref 0.6–1.3)
ERYTHROCYTE [DISTWIDTH] IN BLOOD BY AUTOMATED COUNT: 15.3 % (ref 11.6–15.1)
GFR SERPL CREATININE-BSD FRML MDRD: 99 ML/MIN/1.73SQ M
GLUCOSE SERPL-MCNC: 81 MG/DL (ref 65–140)
HCT VFR BLD AUTO: 37.3 % (ref 34.8–46.1)
HGB BLD-MCNC: 11.7 G/DL (ref 11.5–15.4)
MCH RBC QN AUTO: 29.8 PG (ref 26.8–34.3)
MCHC RBC AUTO-ENTMCNC: 31.4 G/DL (ref 31.4–37.4)
MCV RBC AUTO: 95 FL (ref 82–98)
PLATELET # BLD AUTO: 247 THOUSANDS/UL (ref 149–390)
PMV BLD AUTO: 10.2 FL (ref 8.9–12.7)
POTASSIUM SERPL-SCNC: 3.7 MMOL/L (ref 3.5–5.3)
PROT SERPL-MCNC: 6.4 G/DL (ref 6.4–8.2)
RBC # BLD AUTO: 3.93 MILLION/UL (ref 3.81–5.12)
SODIUM SERPL-SCNC: 141 MMOL/L (ref 136–145)
WBC # BLD AUTO: 5.38 THOUSAND/UL (ref 4.31–10.16)

## 2018-11-06 PROCEDURE — 80053 COMPREHEN METABOLIC PANEL: CPT | Performed by: INTERNAL MEDICINE

## 2018-11-06 PROCEDURE — 99239 HOSP IP/OBS DSCHRG MGMT >30: CPT | Performed by: FAMILY MEDICINE

## 2018-11-06 PROCEDURE — 85027 COMPLETE CBC AUTOMATED: CPT | Performed by: INTERNAL MEDICINE

## 2018-11-06 RX ORDER — SPIRONOLACTONE 50 MG/1
50 TABLET, FILM COATED ORAL DAILY
Qty: 30 TABLET | Refills: 0 | Status: SHIPPED | OUTPATIENT
Start: 2018-11-06 | End: 2019-01-03 | Stop reason: HOSPADM

## 2018-11-06 RX ORDER — POTASSIUM CHLORIDE 20MEQ/15ML
40 LIQUID (ML) ORAL 4 TIMES DAILY
Qty: 900 ML | Refills: 0 | Status: ON HOLD | OUTPATIENT
Start: 2018-11-06 | End: 2018-12-21

## 2018-11-06 RX ADMIN — FERROUS SULFATE TAB 325 MG (65 MG ELEMENTAL FE) 325 MG: 325 (65 FE) TAB at 09:39

## 2018-11-06 RX ADMIN — MAGNESIUM OXIDE TAB 400 MG (241.3 MG ELEMENTAL MG) 400 MG: 400 (241.3 MG) TAB at 09:39

## 2018-11-06 RX ADMIN — Medication 100 MG: at 09:39

## 2018-11-06 RX ADMIN — ZINC SULFATE CAP 220 MG (50 MG ELEMENTAL ZN) 220 MG: 220 (50 ZN) CAP at 09:39

## 2018-11-06 RX ADMIN — POTASSIUM CHLORIDE 20 MEQ: 200 INJECTION, SOLUTION INTRAVENOUS at 00:25

## 2018-11-06 RX ADMIN — POTASSIUM CHLORIDE 40 MEQ: 20 SOLUTION ORAL at 09:45

## 2018-11-06 NOTE — DISCHARGE SUMMARY
Discharge Summary - Arlene Bowen 28 y o  female MRN: [id number]    Unit/Bed#: 401-01 Encounter: [id number]    Admission Date:   Admission Orders     Ordered        11/03/18 1359  Inpatient Admission  Once               Admitting Diagnosis: Hypokalemia [E87 6]  Abnormal laboratory test result [R89 9]  Paresthesia of both feet [R20 2]  Paresthesia of both hands [R20 2]    HPI: Arlene Bowen is a 28 y o  female who has had multiple hospital admissions at [facility] past few months due to recurrent severe hypokalemia that typically manifest with bilat distal lower extremity paresthesias  This has been attributed to 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  She denies any recent diarrhea, nausea or vomiting  She usually takes KCl liquid supplements but despite increased frequency past 2-3 days, she continued to experience worsening tingling to both feet & later on hands  She sought medical attention at this hospital's ED today due to suspected severe hypokalemia, with serum K returning at 2 6  She has received IV KCl supplementation  She denies fever, cough, chest pain, palpitations, dyspnea, abd pain, headache, blurry vision, skin rash or near syncope  Procedures Performed:   Orders Placed This Encounter   Procedures    ED ECG Documentation Only       Summary of Hospital Course:  Hypokalemia secondary to malabsorption secondary to gastric bypass    The patient is a pleasant 79-year-old female with a past medical history of Roshan-en-Y gastric bypass and recent recurrent hypokalemia presents to the emergency room with a chief complaint of distal lower extremity paresthesias  The patient has had an extensive workup as an outpatient and recurrent hypokalemia is attributed to malabsorption from short gut syndrome  Patient is admitted to the medical floor, telemetry unit, IV and oral potassium supplementation was administered    Oral tablet potassium was infected with the patient was doing well on IV and liquid potassium chloride supplementation  She was monitored for 24 hours to ensure stability of serum potassium  Case was discussed with Nephrology and the patient has follow-up in 3 days and repeating labs along with p r n  Significant Findings, Care, Treatment and Services Provided:     Complications: none 3    Discharge Diagnosis: see above    Resolved Problems  Date Reviewed: 11/5/2018    None          Condition at Discharge: good         Discharge instructions/Information to patient and family:   See after visit summary for information provided to patient and family  Provisions for Follow-Up Care:  See after visit summary for information related to follow-up care and any pertinent home health orders  PCP: ARIADNA Cooley    Disposition: Home    Planned Readmission: No    Discharge Statement   I spent 45 minutes discharging the patient  This time was spent on the day of discharge  I had direct contact with the patient on the day of discharge  Additional documentation is required if more than 30 minutes were spent on discharge  Discharge Medications:  See after visit summary for reconciled discharge medications provided to patient and family

## 2018-11-06 NOTE — SOCIAL WORK
Pt is being discharged today  Pt has follow up appointment with Dr Didi Gavin on Friday November 9th  Infusion center called and requested pt to have Dr Tamar Luna  call [facility] center while she is there so they can arrange her first appointment at [facility] center   I also gave patient the phone number for infusion center  Pt with a good understanding of all instruction  Case Management reviewed discharge planning process including the following: identifying help that is needed at home, pt's preference for discharge needs and Meds at [facility]  Reviewed with Pt that any member of the healthcare team can answer questions regarding : medications, jmportance of recognizing  Signs and symptoms of any  medical problems  Case Management also encouraged pt to follow up with all recommended appointments after discharge

## 2018-11-06 NOTE — NURSING NOTE
Patient discharged to home in stable condition  All discharge instructions explained to patient prior to discharge with patient stating verbal understanding of same  Patient chose to walk to the hospital exit instead of accepting the wheelchair that was offered and was escorted to the 4th floor elevator by Romie Dudley RN  Patient's  waiting in hospital parking lot for transport to home

## 2018-11-06 NOTE — PROGRESS NOTES
Progress Note - Nephrology   Maykelparadisepau Justin 28 y o  female MRN: [id number]  Unit/Bed#: 401-01 Encounter: [id number]    A/P:  1  Hypokalemia              As mentioned in HPI, patient has had a full gastroenterology workup and has been without for malabsorption  That being said in the past, the patient improved significantly from going from the tablets to the liquid potassium  At the time the GI workup to place, the patient was on the liquid potassium supplementation  From the renal standpoint, patient had potassium in her urine at presentation in the setting of a serum potassium of 2 6 millimole/L  Potassium should have been undetectable  Patient was on her spironolactone 25 mg daily at that time  I will increase the patient's spironolactone 50 mg p o  Daily  Patient is currently on 120 mEq of oral potassium supplementation, I will increase that back up to 160 mEq over the course of the day is this is her typical outpatient regimen  Just prior to presentation, the patient was on 240 mEq over the course of the day      From the renal standpoint, it is very difficult to attribute over 240 mEq of potassium leaving the body with a urine potassium level 14 millimole/L in the setting of a urine creatinine of 210 mg/dL  As mentioned in previous consultations, the trans tubular gradient is no longer in use as discussed in prior papers by Dr Arabella Mathews of drawn to [place] who was in fact the creator of the Potassium trans tubular gradient equation      With respect outpatient follow-up, we will see the patient this Friday November 9th, we asked the patient have blood work done on Wednesday November 7th  We will try to arrange for the patient have easy access to both blood work as well as to IV potassium infusions in the infusion center in order to try to medicate future admissions for low potassium readings  11/6:  Potassium better at this time  Continue monitor very closely in the outpatient setting    Will make arrangements with the infusion center to give the patient IV potassium infusions as indicated  She may also being magnesium infusions as well which will also set up  Patient to be given a slip for blood draws to be done every 48 hours as needed to reassess potassium magnesium and any other electrolytes and may be indicated  2  History of hypophosphatemia               continue monitor  Has been appropriate  3  Hypomagnesemia              Continue with oral supplementation the outpatient setting once a day  4  Status post gastric bypass surgery  5  Acidosis   This has been worked up in the past, patient tends to go into respiratory alkalosis  Would avoid any for bicarbonate supplementation at this time  This is not due to a renal tubular acidosis at this time  Follow up reason for today's visit:  Electrolyte abnormalities    Hypokalemia    Patient Active Problem List   Diagnosis    Hypokalemia    History of gastric bypass    Migraine without aura and without status migrainosus, not intractable    Left-sided weakness    Hypophosphatemia    Anemia    Vitamin D deficiency    Weakness of both lower extremities    Weakness of both upper extremities    Elevated CPK    Vitamin B12 deficiency    Cervical lymphadenopathy    Generalized weakness    Paresthesia of both lower extremities    Paresthesias    B12 deficiency    Gastric bypass status for obesity    GERD (gastroesophageal reflux disease)    Migraine    WAGNER (obstructive sleep apnea)    Other intestinal malabsorption         Subjective:   No acute events overnight  Objective:     Vitals: Blood pressure 98/53, pulse 76, temperature 97 9 °F (36 6 °C), temperature source Temporal, resp  rate 18, height 5' 3" (1 6 m), weight 72 1 kg (158 lb 15 2 oz), SpO2 100 %  ,Body mass index is 28 16 kg/m²      Weight (last 2 days)     Date/Time   Weight    11/06/18 0600  72 1 (158 95)    11/05/18 0600  70 1 (154 54)    11/04/18 0600  68 (149 91) Intake/Output Summary (Last 24 hours) at 11/06/18 0832  Last data filed at 11/05/18 1910   Gross per 24 hour   Intake              420 ml   Output                0 ml   Net              420 ml     I/O last 3 completed shifts: In: 900 [P O :900]  Out: -          Physical Exam: BP 98/53 (BP Location: Left arm)   Pulse 76   Temp 97 9 °F (36 6 °C) (Temporal)   Resp 18   Ht 5' 3" (1 6 m)   Wt 72 1 kg (158 lb 15 2 oz)   SpO2 100%   BMI 28 16 kg/m²     General Appearance:    Alert, cooperative, no distress, appears stated age   Head:    Normocephalic, without obvious abnormality, atraumatic   Eyes:    Conjunctiva/corneas clear   Ears:    Normal external ears   Nose:   Nares normal, septum midline, mucosa normal, no drainage    or sinus tenderness   Throat:   Lips, mucosa, and tongue normal; teeth and gums normal   Neck:   Supple   Back:     Symmetric, no curvature, ROM normal, no CVA tenderness   Lungs:     Clear to auscultation bilaterally, respirations unlabored   Chest wall:    No tenderness or deformity   Heart:    Regular rate and rhythm, S1 and S2 normal, no murmur, rub   or gallop   Abdomen:     Soft, non-tender, bowel sounds active   Extremities:   Extremities normal, atraumatic, no cyanosis or edema   Skin:   Skin color, texture, turgor normal, no rashes or lesions   Lymph nodes:   Cervical normal   Neurologic:   CNII-XII intact            Lab, Imaging and other studies: I have personally reviewed pertinent labs    CBC: Lab Results   Component Value Date    WBC 5 38 11/06/2018    HGB 11 7 11/06/2018    HCT 37 3 11/06/2018    MCV 95 11/06/2018     11/06/2018    MCH 29 8 11/06/2018    MCHC 31 4 11/06/2018    RDW 15 3 (H) 11/06/2018    MPV 10 2 11/06/2018     CMP: Lab Results   Component Value Date    K 3 7 11/06/2018     (H) 11/06/2018    CO2 19 (L) 11/06/2018    BUN 7 11/06/2018    CREATININE 0 78 11/06/2018    CALCIUM 8 0 (L) 11/06/2018    AST 21 11/06/2018    ALT 30 11/06/2018 ALKPHOS 37 (L) 11/06/2018    EGFR 99 11/06/2018         Results from last 7 days  Lab Units 11/06/18  0615 11/05/18  1808 11/05/18  0631  11/04/18  0629  11/03/18  1248   POTASSIUM mmol/L 3 7 3 8 3 2*  < > 3 0*  < > 2 6*   CHLORIDE mmol/L 113* 112* 110*  < > 113*  < > 104   CO2 mmol/L 19* 18* 23  < > 19*  < > 22   BUN mg/dL 7 11 7  < > 13  < > 17   CREATININE mg/dL 0 78 0 92 0 80  < > 0 89  < > 1 11   CALCIUM mg/dL 8 0* 7 8* 8 2*  < > 7 7*  < > 8 7   ALK PHOS U/L 37*  --   --   --  37*  --  51   ALT U/L 30  --   --   --  27  --  39   AST U/L 21  --   --   --  16  --  20   < > = values in this interval not displayed  Phosphorus: No results found for: PHOS  Magnesium: No results found for: MG  Urinalysis: No results found for: Emi Stain, SPECGRAV, PHUR, LEUKOCYTESUR, NITRITE, PROTEINUA, GLUCOSEU, KETONESU, BILIRUBINUR, BLOODU  Ionized Calcium: No results found for: CAION  Coagulation: No results found for: PT, INR, APTT  Troponin: No results found for: TROPONINI  ABG: No results found for: PHART, QVF6HRQ, PO2ART, XJE8TDK, [id number], BEART, SOURCE  Radiology review:     IMAGING  No results found      Current Facility-Administered Medications   Medication Dose Route Frequency    acetaminophen (TYLENOL) tablet 650 mg  650 mg Oral Q6H PRN    amitriptyline (ELAVIL) tablet 50 mg  50 mg Oral HS    enoxaparin (LOVENOX) subcutaneous injection 40 mg  40 mg Subcutaneous Daily    ergocalciferol (VITAMIN D2) capsule 50,000 Units  50,000 Units Oral Weekly    ferrous sulfate tablet 325 mg  325 mg Oral BID With Meals    magnesium oxide (MAG-OX) tablet 400 mg  400 mg Oral BID    meclizine (ANTIVERT) tablet 25 mg  25 mg Oral HS    potassium chloride 10 % oral solution 40 mEq  40 mEq Oral 4x Daily    spironolactone (ALDACTONE) tablet 50 mg  50 mg Oral Daily    thiamine (VITAMIN B1) tablet 100 mg  100 mg Oral Daily    zinc sulfate (ZINCATE) capsule 220 mg  220 mg Oral Daily     Medications Discontinued During This Encounter   Medication Reason    zinc gluconate tablet 50 mg Availability    sodium chloride 0 9 % with KCl 40 mEq/L infusion (premix)     potassium chloride 10 % oral solution 40 mEq     potassium chloride 10 % oral solution 40 mEq     potassium chloride 20 mEq IVPB (premix)        Korey Escaleraing, DO

## 2018-11-25 ENCOUNTER — HOSPITAL ENCOUNTER (EMERGENCY)
Facility: HOSPITAL | Age: 33
Discharge: HOME | End: 2018-11-25
Attending: EMERGENCY MEDICINE | Admitting: EMERGENCY MEDICINE
Payer: COMMERCIAL

## 2018-11-25 VITALS
RESPIRATION RATE: 18 BRPM | HEIGHT: 63 IN | DIASTOLIC BLOOD PRESSURE: 54 MMHG | WEIGHT: 158.95 LBS | OXYGEN SATURATION: 100 % | TEMPERATURE: 98.2 F | HEART RATE: 78 BPM | SYSTOLIC BLOOD PRESSURE: 107 MMHG | BODY MASS INDEX: 28.16 KG/M2

## 2018-11-25 DIAGNOSIS — E87.5 HYPERKALEMIA: Primary | ICD-10-CM

## 2018-11-25 LAB
ANION GAP SERPL CALCULATED.3IONS-SCNC: 10 MMOL/L (ref 4–13)
BASOPHILS # BLD AUTO: 0.06 THOUSANDS/ΜL (ref 0–0.1)
BASOPHILS NFR BLD AUTO: 1 % (ref 0–1)
BUN SERPL-MCNC: 15 MG/DL (ref 5–25)
CALCIUM SERPL-MCNC: 8.3 MG/DL (ref 8.3–10.1)
CHLORIDE SERPL-SCNC: 105 MMOL/L (ref 100–108)
CO2 SERPL-SCNC: 23 MMOL/L (ref 21–32)
CREAT SERPL-MCNC: 0.93 MG/DL (ref 0.6–1.3)
EOSINOPHIL # BLD AUTO: 0.15 THOUSAND/ΜL (ref 0–0.61)
EOSINOPHIL NFR BLD AUTO: 2 % (ref 0–6)
ERYTHROCYTE [DISTWIDTH] IN BLOOD BY AUTOMATED COUNT: 15.2 % (ref 11.6–15.1)
GFR SERPL CREATININE-BSD FRML MDRD: 80 ML/MIN/1.73SQ M
GLUCOSE SERPL-MCNC: 85 MG/DL (ref 65–140)
HCT VFR BLD AUTO: 37.6 % (ref 34.8–46.1)
HGB BLD-MCNC: 12.6 G/DL (ref 11.5–15.4)
IMM GRANULOCYTES # BLD AUTO: 0.01 THOUSAND/UL (ref 0–0.2)
IMM GRANULOCYTES NFR BLD AUTO: 0 % (ref 0–2)
LYMPHOCYTES # BLD AUTO: 2.22 THOUSANDS/ΜL (ref 0.6–4.47)
LYMPHOCYTES NFR BLD AUTO: 36 % (ref 14–44)
MCH RBC QN AUTO: 30.3 PG (ref 26.8–34.3)
MCHC RBC AUTO-ENTMCNC: 33.5 G/DL (ref 31.4–37.4)
MCV RBC AUTO: 90 FL (ref 82–98)
MONOCYTES # BLD AUTO: 0.43 THOUSAND/ΜL (ref 0.17–1.22)
MONOCYTES NFR BLD AUTO: 7 % (ref 4–12)
NEUTROPHILS # BLD AUTO: 3.36 THOUSANDS/ΜL (ref 1.85–7.62)
NEUTS SEG NFR BLD AUTO: 54 % (ref 43–75)
NRBC BLD AUTO-RTO: 0 /100 WBCS
PLATELET # BLD AUTO: 300 THOUSANDS/UL (ref 149–390)
PMV BLD AUTO: 10.3 FL (ref 8.9–12.7)
POTASSIUM SERPL-SCNC: 3.2 MMOL/L (ref 3.5–5.3)
RBC # BLD AUTO: 4.16 MILLION/UL (ref 3.81–5.12)
SODIUM SERPL-SCNC: 138 MMOL/L (ref 136–145)
WBC # BLD AUTO: 6.23 THOUSAND/UL (ref 4.31–10.16)

## 2018-11-25 PROCEDURE — 85025 COMPLETE CBC W/AUTO DIFF WBC: CPT | Performed by: EMERGENCY MEDICINE

## 2018-11-25 PROCEDURE — 80048 BASIC METABOLIC PNL TOTAL CA: CPT | Performed by: EMERGENCY MEDICINE

## 2018-11-25 PROCEDURE — 99284 EMERGENCY DEPT VISIT MOD MDM: CPT

## 2018-11-25 PROCEDURE — 36415 COLL VENOUS BLD VENIPUNCTURE: CPT | Performed by: EMERGENCY MEDICINE

## 2018-11-25 NOTE — ED PROVIDER NOTES
History  Chief Complaint   Patient presents with    Facial Numbness     Patient states her face is numb and tingling since yesterday and in her hands and toes, states this is normal for her when her potassium is low     Patient here she is worried about her potassium level     States that she had repeat blood work done a potassium last week which included 1 noted by her nephrologist at that time was 3 7  Then less than 24 hours later she had another repeat potassium ordered by her primary care physician which turned out to   3 2  States that she was worried that her potassium level was dropping throughout the week     She had gone to [place] with her family but the told her  that she was not feeling well about the potassium so she came back home this morning     States that she has had for the past several days increasing familiar facial numbness which initially courage around her lips and then work her way into the nose and then retro-orbital into her forehead any years  States that she is compliant with her oral potassium supplement which she takes 4 times a day  States whenever her potassium level was low she requires IV potassium for 3 days in the hospital to stabilize her potassium  She has been evaluated signed off by neurologist including being evaluated by the neurologist having MRA of her brain  She also sees a nephrologist and she has also been evaluated by GI also for her hyperkalemia          History provided by:  Patient and medical records   used: No    Neurologic Problem   Location:  Facial  Severity:  Unable to specify  Onset quality:  Gradual  Duration:  3 days  Timing:  Constant  Progression:  Worsening  Chronicity:  Recurrent  Associated symptoms: no abdominal pain, no chest pain, no congestion, no cough, no diarrhea, no ear pain, no fatigue, no fever, no headaches, no loss of consciousness, no myalgias, no nausea, no rash, no rhinorrhea, no shortness of breath, no sore throat, no vomiting and no wheezing        Prior to Admission Medications   Prescriptions Last Dose Informant Patient Reported? Taking?   amitriptyline (ELAVIL) 50 mg tablet 11/24/2018 at Unknown time  Yes Yes   Sig: Take 50 mg by mouth daily at bedtime   b complex-C-folic acid (NEPHRO-EDIE) 0 8 mg tablet 11/24/2018 at Unknown time  Yes Yes   Sig: Take 0 8 mg by mouth daily with dinner   ergocalciferol (ERGOCALCIFEROL) 85543 units capsule Past Week at Unknown time  Yes Yes   Sig: Take 50,000 Units by mouth once a week   ferrous sulfate 325 (65 Fe) mg tablet 11/25/2018 at Unknown time  Yes Yes   Sig: Take 325 mg by mouth 2 (two) times a day with meals   magnesium oxide (MAG-OX) 400 mg 11/25/2018 at Unknown time  No Yes   Sig: Take 1 tablet (400 mg total) by mouth daily   meclizine (ANTIVERT) 25 mg tablet 11/24/2018 at Unknown time  Yes Yes   Sig: Take 25 mg by mouth daily at bedtime   spironolactone (ALDACTONE) 50 mg tablet 11/25/2018 at Unknown time  No Yes   Sig: Take 1 tablet (50 mg total) by mouth daily   thiamine 100 MG tablet 11/25/2018 at Unknown time  Yes Yes   Sig: Take 100 mg by mouth daily   zinc gluconate 50 mg tablet 11/25/2018 at Unknown time  Yes Yes   Sig: Take 50 mg by mouth daily      Facility-Administered Medications: None       Past Medical History:   Diagnosis Date    H  pylori infection     Hypokalemia     Migraine        Past Surgical History:   Procedure Laterality Date    ABDOMINAL SURGERY      APPENDECTOMY      CHOLECYSTECTOMY      FL GUIDED CENTRAL VENOUS ACCESS REPLACEMENT  12/21/2018    GASTRIC BYPASS      HYSTERECTOMY      TUNNELED VENOUS PORT PLACEMENT N/A 12/21/2018    Procedure: INSERTION VENOUS PORT (PORT-A-CATH);   Surgeon: Estrellita Talavera DO;  Location: [facility];  Service: General       Family History   Problem Relation Age of Onset    Hypertension Father     Diabetes Father     Diabetes Maternal Grandfather      I have reviewed and agree with the history as documented  Social History   Substance Use Topics    Smoking status: Never Smoker    Smokeless tobacco: Never Used    Alcohol use Yes      Comment: socially        Review of Systems   Constitutional: Negative  Negative for fatigue and fever  HENT: Negative  Negative for congestion, ear pain, rhinorrhea and sore throat  Eyes: Negative  Respiratory: Negative  Negative for cough, shortness of breath and wheezing  Cardiovascular: Negative  Negative for chest pain  Gastrointestinal: Negative  Negative for abdominal pain, diarrhea, nausea and vomiting  Endocrine: Negative  Genitourinary: Negative  Musculoskeletal: Negative  Negative for myalgias  Skin: Negative  Negative for rash  Allergic/Immunologic: Negative  Neurological: Negative  Negative for loss of consciousness and headaches  Hematological: Negative  Psychiatric/Behavioral: Negative  Physical Exam  Physical Exam   Constitutional: She is oriented to person, place, and time  She appears well-developed and well-nourished  No distress  HENT:   Head: Normocephalic and atraumatic  Right Ear: External ear normal    Left Ear: External ear normal    Nose: Nose normal    Mouth/Throat: Oropharynx is clear and moist    Eyes: Pupils are equal, round, and reactive to light  Conjunctivae and EOM are normal    Neck: Normal range of motion  Neck supple  No JVD present  No tracheal deviation present  No thyromegaly present  Cardiovascular: Normal rate, regular rhythm, normal heart sounds and intact distal pulses  Exam reveals no gallop and no friction rub  No murmur heard  Pulmonary/Chest: Effort normal and breath sounds normal  No stridor  No respiratory distress  She has no wheezes  She has no rales  She exhibits no tenderness  Abdominal: Soft  Bowel sounds are normal  She exhibits no distension and no mass  There is no tenderness  There is no rebound and no guarding  No hernia     Genitourinary: Rectal exam shows guaiac negative stool  Musculoskeletal: Normal range of motion  She exhibits no edema, tenderness or deformity  Lymphadenopathy:     She has no cervical adenopathy  Neurological: She is alert and oriented to person, place, and time  She displays normal reflexes  No cranial nerve deficit or sensory deficit  She exhibits normal muscle tone  Coordination normal    Skin: Skin is warm and dry  Capillary refill takes less than 2 seconds  No rash noted  She is not diaphoretic  No erythema  No pallor  Psychiatric: She has a normal mood and affect  Her behavior is normal  Judgment and thought content normal    Nursing note and vitals reviewed        Vital Signs  ED Triage Vitals [11/25/18 1435]   Temperature Pulse Respirations Blood Pressure SpO2   98 2 °F (36 8 °C) 83 18 134/63 100 %      Temp Source Heart Rate Source Patient Position - Orthostatic VS BP Location FiO2 (%)   Temporal Monitor Sitting Left arm --      Pain Score       No Pain           Vitals:    11/25/18 1435 11/25/18 1530 11/25/18 1600   BP: 134/63 111/56 107/54   Pulse: 83 76 78   Patient Position - Orthostatic VS: Sitting Lying Lying       Visual Acuity  Visual Acuity      Most Recent Value   L Pupil Size (mm)  3   R Pupil Size (mm)  3          ED Medications  Medications - No data to display    Diagnostic Studies  Results Reviewed     Procedure Component Value Units Date/Time    Basic metabolic panel [[id number]]  (Abnormal) Collected:  11/25/18 1515    Lab Status:  Final result Specimen:  Blood from Arm, Left Updated:  11/25/18 1603     Sodium 138 mmol/L      Potassium 3 2 (L) mmol/L      Chloride 105 mmol/L      CO2 23 mmol/L      ANION GAP 10 mmol/L      BUN 15 mg/dL      Creatinine 0 93 mg/dL      Glucose 85 mg/dL      Calcium 8 3 mg/dL      eGFR 80 ml/min/1 73sq m     Narrative:         National Kidney Disease Education Program recommendations are as follows:  GFR calculation is accurate only with a steady state creatinine  Chronic Kidney disease less than 60 ml/min/1 73 sq  meters  Kidney failure less than 15 ml/min/1 73 sq  meters  CBC and differential [[id number]]  (Abnormal) Collected:  11/25/18 1515    Lab Status:  Final result Specimen:  Blood from Arm, Left Updated:  11/25/18 1519     WBC 6 23 Thousand/uL      RBC 4 16 Million/uL      Hemoglobin 12 6 g/dL      Hematocrit 37 6 %      MCV 90 fL      MCH 30 3 pg      MCHC 33 5 g/dL      RDW 15 2 (H) %      MPV 10 3 fL      Platelets 245 Thousands/uL      nRBC 0 /100 WBCs      Neutrophils Relative 54 %      Immat GRANS % 0 %      Lymphocytes Relative 36 %      Monocytes Relative 7 %      Eosinophils Relative 2 %      Basophils Relative 1 %      Neutrophils Absolute 3 36 Thousands/µL      Immature Grans Absolute 0 01 Thousand/uL      Lymphocytes Absolute 2 22 Thousands/µL      Monocytes Absolute 0 43 Thousand/µL      Eosinophils Absolute 0 15 Thousand/µL      Basophils Absolute 0 06 Thousands/µL                  No orders to display              Procedures  Procedures       Phone Contacts  ED Phone Contact    ED Course  ED Course as of Dec 25 1451   Shana Nov 25, 2018   1617 Discussed with the patient about her potassium level  She declines any type of potassium supplement here in the emergency department     States that she will take her on status septum supplements at home  Also discussed with her the dietary changes including eating all the foods in whole formed including her care [employer]   Neurology [address] [facility]  Jump to Section ? Discontinued MedicationsDocument InformationEncounter DetailsFunctional StatusInstructionsLast Filed Vital SignsOrdered PrescriptionsPatient DemographicsPlan of TreatmentProgress NotesReason for VisitSocial HistoryVisit Diagnoses  Colton Davenport  - 28 y o   Female, born Feb 08, 1985 Encounter Summary, generated on Aug  28, 2018   Reason for Visit    Reason Comments   Follow-up     Encounter Details    Date Type Department Care Team Description   08/27/2018 Office Visit Neurology [facility]    [address] [facility] [address]    [place] [address] [phone]   Sharmila Wiseman MD    [address] [place] [address]   [address]    [place]   [phone] (Fax)   Chronic migraine (Primary Dx); Nausea; Dizziness; Hyponatremia   Social History  - as of this encounter  Tobacco Use Types Packs/Day Years Used Date   Never Smoker           Smokeless Tobacco: Never Used           Alcohol Use Drinks/Week oz/Week Comments   Yes 0 Standard drinks or equivalent   0 0  socially     Sex Assigned at Birth Date Recorded   Not on file     Last Filed Vital Signs  - in this encounter  Vital Sign Reading Time Taken   Blood Pressure 100/70 08/27/2018 3:27 PM EDT   Pulse 96 08/27/2018 3:27 PM EDT   Temperature 37 2 °C (99 °F) 08/27/2018 3:27 PM EDT   Respiratory Rate 16 08/27/2018 3:27 PM EDT   Oxygen Saturation 99% 08/27/2018 3:27 PM EDT   Inhaled Oxygen Concentration - -   Weight 68 2 kg (150 lb 4 8 oz) 08/27/2018 3:27 PM EDT   Height 158 8 cm (5' 2 5") 08/27/2018 3:27 PM EDT   Body Mass Index 27 05 08/27/2018 3:27 PM EDT   Functional Status  - as of this encounter  Functional Status Response Date of Assessment   Are you deaf or do you have serious difficulty hearing? No 05/27/2016   Are you blind or do you have serious difficulty seeing, even when wearing glasses? No 05/27/2016   Do you have serious difficulty walking or climbing stairs? No 05/27/2016   Do you have difficulty dressing or bathing? No 05/27/2016   Because of a physical, mental, or emotional condition, do you have difficulty doing errands alone such as visiting a doctor's office or shopping? No 05/27/2016     Cognitive Status Response Date of Assessment   Because of physical, mental, or emotional condition, do you have serious difficulty concentrating, remembering, or making decisons?  No 05/27/2016   Instructions  - in this encounter  Patient Instructions - Napoleon Johnson MD - 2018 3:30 PM EDT  1  Continue Topamax t 50 mg daily  2  Reduce amitriptyline to 50 mg bedtime   3  Continue Imitrex 100 m pill at onset of severe headache, can repeat 1pill in 2 hr if not improved, max 2 pills in 24 hr  4  Continue  Meclizine 25 nightly as needed  5  Continue potassium 80 MEQ daily  6  Return to clinic for Botox injection in 6 weeks         Ordered Prescriptions  - in this encounter  Prescription Sig  Disp  Refills Start Date End Date   meclizine (ANTIVERT) 25 mg tablet    Indications: Dizziness Take 1 tablet (25 mg total) by mouth nightly  30 tablet   4 2018     amitriptyline (ELAVIL) 50 MG tablet    Indications: Chronic migraine Take 1 tablet (50 mg total) by mouth nightly  90 tablet   4 2018   topiramate (TOPAMAX) 50 MG tablet    Indications: Chronic migraine Take 1 tablet (50 mg total) by mouth daily  90 tablet   4 2018   Progress Notes  - in this encounter  Napoleon Johnson MD - 2018 3:30 PM EDT  Formatting of this note may be different from the original   Outpatient Neurology Followup Note    PATIENT NAME: Em Laird    MRN: [id number]    DATE OF SERVICE: 2018    Dear Dr Peace Mayorga MD,    It was my pleasure to see your patient Em Laird in the Neurology clinic today on 2018  CHIEF COMPLAINT: Followup intractable headaches      HISTORY OF PRESENT ILLNESS: Em Larid is a 34 y o  right-handed female with history of anxiety and head trauma, who follows up intractable headaches  The headache is getting better significantly after the 1st Botox injection  She has mild headache 1-2 times a week  She takes Topamax 50 mg nightly and Amitriptyline 100 mg bedtime  She does not use the oral Imitrex recently      The dizziness with head spinning sensation is improved now  She uses Meclizine 25 mg nightly      She has intermittent tingling and numbness in the mouth, lip, feet and hands since about a week ago  She went to [facility] for evluation  The workup showed she has low potassium  She takes potassium 80 MEQ daily  She denies focal numbness, weakness, unsteady gait, bowel or bladder problem  REVIEW OF SYSTEMS: Except as mentioned in the HPI, review of systems is essentially negative for the 10 major systems  PAST MEDICAL HISTORY:   Past Medical History:   Diagnosis Date    Anxiety   hx 2006    Andres scale for skin risk assessment score 2, potential problem 16   presently being treated with loprox cream under panicula of abdoment to prevent rash   Clostridium difficile diarrhea 7/15   12-28-15-pt states resolved   Dysfunctional uterine bleeding 12-28-15   with pelvic pain-surgery planned    Dysmenorrhea    Excess skin of abdominal wall 16   s/p gastric bypass -paniculectomy planned      Gestational diabetes    History of gastric bypass    Morbid obesity ([facility] [address] ) bariatric surgery    BMI 32 3 12-28-15    Pneumonia 6/15    Rash 12-28-15   UNDER BOTH BREASTS AND ABD FOLDS-S/P BARIATRIC SURGERY     Sleep apnea   resolved since bariatric surgery    Snoring    Vaginal pain     PAST SURGICAL HISTORY:   Past Surgical History:   Procedure Laterality Date    APPENDECTOMY 10/27/2011     SECTION    CHOLECYSTECTOMY 2008    CYSTOSCOPY 3/23/16   Dr Ira Herring, post hyst    DIAGNOSTIC LAPAROSCOPY 8/14/15   for pelvic pain    GASTRIC BYPASS 10/01/2014   Dr Linda Dubois 2016   Dr Ira Herring, DUB    PANNICULECTOMY   [facility] 3/23/2016   Dr Ira Herring, DUB     ALLERGIES:   Allergies   Allergen Reactions    Nsaids (Non-Steroidal Anti-Inflammatory Drug) Other (See Comments)   Should not take s/p bariatric surgery     SOCIAL HISTORY: , non smoker, No ETOH or drug abuse    CURRENT MEDICATIONS:     Current Outpatient Prescriptions on File Prior to Visit Medication Sig Dispense Refill    hyoscyamine (LEVSIN/SL) 0 125 mg SL tablet TAKE 1 TABLET UNDER TONGUE EVERY 4 HOURS AS NEEDED 0    lansoprazole (PREVACID) 30 MG capsule Take 30 mg by mouth daily   sod phos di, mono-K phos mono (K-PHOS NEUTRAL) 250 mg Tab Take 1 tablet by mouth   sucralfate (CARAFATE) 1 gram tablet Take 1 tablet (1 g total) by mouth 4 (four) times daily  120 tablet 2    SUMAtriptan (IMITREX) 100 MG tablet Try Imitrex 100 m pill at onset of severe headache, can repeat 1pill in 2 hr if not improved, max 2 pills in 24 hr 10 tablet 3    [DISCONTINUED] meclizine (ANTIVERT) 25 mg tablet TAKE 25MG (1 TAB) BY MOUTH THREE TIMES DAILY AS NEEDED FOR VERTIGO/DIZZINESS 4    [DISCONTINUED] topiramate (TOPAMAX) 100 MG tablet Take 1 tablet (100 mg total) by mouth 2 (two) times daily  (Patient taking differently: Take 100 mg by mouth daily  ) 60 tablet 11    EPINephrine (EPIPEN) 0 3 mg/0 3 mL AtIn Inject 0 3 mLs (0 3 mg total) into the muscle once for 1 dose  2 each 0    [DISCONTINUED] amitriptyline (ELAVIL) 100 MG tablet Take 1 tablet (100 mg total) by mouth nightly  30 tablet 11     PHYSICAL EXAMINATIONS:    /70 (Site: Left upper arm, Position: Sitting, Cuff Size: Regular cuff)  Pulse 96  Temp 37 2 °C (99 °F) (Oral)  Resp 16  Ht 1 588 m (5' 2 5")  Wt 68 2 kg (150 lb 4 8 oz)  LMP 2016 (Approximate)  SpO2 99%  BMI 27 05 kg/m²     General: NAD   HEENT: No tenderness in the scalp  atraumatic head, no carotid bruit   Heart: RRR, No M/G/R   Lungs: Clear to auscultation  Abd: soft, Bowel sounds present, no D/T   Ext: normal range of motion, no skin rash     Neurological Examinations:    Mental status: Patient is awake, alert, and orientated x3, language: fluent, normal comprehension  Cranial Nerves: PERRLA, EOMI, No nystagmus, no visual field cut, no sensation deficit on face, close eye tightly bilaterally, no facial droop  No hearing loss  Soft palate elevation is symmetric  Shrug shoulder symmetric  Tongue protrudes in the middle line  Motor: normal muscle tone and bulk  Muscle strength: 5/5 in all limbs  Sensory: Normal light touch and proprioception in the arms and legs bilaterally  DTRs: 2/2 in biceps, triceps, brachioradialis, 2/2 in patellar and Achilles tendons bilaterally  Plantar Reflex: Down going toes bilaterally   Coordination: Finger to nose to finger intact bilaterally  Gait: causal gait normal  Abnormal movements: none    PERTINENT DIAGNOSTIC TESTS:     CTA of neck 2017: No hemodynamically significant stenosis      Head CT 8/10/2017:  No acute intracranial abnormality      EKG 8/10/2017: 79 bpm, Normal sinus rhythm with sinus arrhythmia      Labs : K 3 1 (L), Na 142, Cr 1 02,     I have personally reviewed the above imaging, procedure and lab studies  ASSESSMENT:    Joyce Cabrera is a 34 y o  right-handed female with history of anxiety and head trauma, who follows up intractable headaches       On exam, she has focal numbness or weakness      The patient has chronic migraine headache which is improved with Botox injection  The dizziness with spinning sensation is improved with Meclizine  PLANS:       1  Continue Topamax t 50 mg daily  2  Reduce amitriptyline to 50 mg bedtime   3  Continue Imitrex 100 m pill at onset of severe headache, can repeat 1pill in 2 hr if not improved, max 2 pills in 24 hr  4  Continue  Meclizine 25 nightly as needed  5  Continue potassium 80 MEQ daily  6  Return to clinic for Botox injection in 6 weeks      I have personally seen this patient, reviewed the history and all the available data, performed the physical examination as documented above, and formulated the plan of care  Also discussed with patient about the side effects of the medications  All questions the patient and her/his family have are answered  Thank you for allowing me to take care of your patient      Josselyn Feliciano MD      Plan of Treatment  - as of this encounter  Upcoming Encounters  Upcoming Encounters   Date Type Specialty Care Team Description   10/08/2018 Procedure visit Neurology Zion Rossi MD    [address] [facility] [address] [facility] [address]    [place]   [phone] (Fax)       Visit Diagnoses    Diagnosis   Chronic migraine - Primary   Nausea   Nausea alone     Dizziness   Dizziness and giddiness     Hyponatremia   Hyposmolality and/or hyponatremia     Discontinued Medications  - as of this encounter  Prescription Sig  Discontinue Reason Start Date End Date   topiramate (TOPAMAX) 100 MG tablet    Indications: Chronic migraine Take 1 tablet (100 mg total) by mouth 2 (two) times daily  Dose adjustment 10/25/2017 08/27/2018   amitriptyline (ELAVIL) 100 MG tablet    Indications: Intractable migraine without aura and with status migrainosus Take 1 tablet (100 mg total) by mouth nightly  Dose adjustment 08/17/2017 08/27/2018   meclizine (ANTIVERT) 25 mg tablet   TAKE 25MG (1 TAB) BY MOUTH THREE TIMES DAILY AS NEEDED FOR VERTIGO/DIZZINESS             MRA BRAIN     INDICATION:  LEFT SIDED WEAKNESS   TIA/CVA      COMPARISON:  None      TECHNIQUE:  Axial 3-D time-of-flight imaging with 3-D reconstructions      FINDINGS:     IMAGE QUALITY:  Diagnostic      ANATOMY     INTERNAL CAROTID ARTERIES:  Normal flow related enhancement of the distal cervical, petrous and cavernous segments of the internal carotid arteries  Normal ICA terminus      ANTERIOR CIRCULATION:  Normal A1 segments  Normal anterior communicating artery  Normal flow-related enhancement of the anterior cerebral arteries      MIDDLE CEREBRAL ARTERY CIRCULATION:  The M1 segment and middle cerebral artery branches demonstrate normal flow-related enhancement      DISTAL VERTEBRAL ARTERIES:  Distal left vertebral artery is dominant    The distal right vertebral artery is slightly hypoplastic but consistent in caliber without stenosis       BASILAR ARTERY: Normal      POSTERIOR CEREBRAL ARTERIES:  Both posterior cerebral arteries arises from the basilar tip  Both arteries demonstrate normal flow-related enhancement  Normal posterior communicating arteries      IMPRESSION:     Normal MR angiogram of the brain         Workstation performed: [id number]       Order Questions     Question Answer Comment   Exam reason confusion    Note:  Enter reason for exam   Pregnant? No           Reason For Exam     confusion   Study Result     LUMBAR PUNCTURE UNDER FLUOROSCOPY 7/13/2018     History: Mental status change     Fluoroscopy time: 0 8 minutes     Images: 3     Conscious sedation time: Not applicable     Procedure:      The patient was identified verbally, and by wrist band   " Time out" was performed  Informed consent was obtained      Following obtaining informed consent, the lower back was prepped and draped in usual sterile fashion  Lidocaine was given as local anesthesia      Using fluoroscopic guidance, A 20 gauge needle was placed over the L4-5 interspace  Clear CSF was returned  This was sampled in multiple tubes, and sent for laboratory analysis as requested      Findings:      CSF appear grossly clear    Opening pressure of 14 was obtained      IMPRESSION:  Impression:     Successful lumbar puncture under fluoroscopic guidance         Workstation performed: [id number]                   [facility]  Number of Diagnoses or Management Options     Amount and/or Complexity of Data Reviewed  Clinical lab tests: ordered and reviewed  Decide to obtain previous medical records or to obtain history from someone other than the patient: yes (Neurology Office visit from Aug 27 2018)    Patient Progress  Patient progress: stable    CritCare Time    Disposition  Final diagnoses:   Hyperkalemia     Correction : Final Diagnosis : Hypokalemia      Time reflects when diagnosis was documented in both MDM as applicable and the Disposition within this note     Time User Action Codes Description Comment    11/25/2018  4:18 PM Yadira Zepeda Add [E87 5] Hyperkalemia       ED Disposition     ED Disposition Condition Comment    Discharge  Damon Shows Deuber discharge to home/self care  Condition at discharge: Stable        Follow-up Information     Follow up With Specialties Details Why [address], [address] [facility], Nurse Practitioner In 1 day  [facility]  [address]  [phone]            Discharge Medication List as of 11/25/2018  4:19 PM      CONTINUE these medications which have NOT CHANGED    Details   amitriptyline (ELAVIL) 50 mg tablet Take 50 mg by mouth daily at bedtime, Starting Mon 8/27/2018, Until Tue 8/27/2019, Historical Med      b complex-C-folic acid (NEPHRO-EDIE) 0 8 mg tablet Take 0 8 mg by mouth daily with dinner, Historical Med      ergocalciferol (ERGOCALCIFEROL) 87580 units capsule Take 50,000 Units by mouth once a week, Historical Med      ferrous sulfate 325 (65 Fe) mg tablet Take 325 mg by mouth 2 (two) times a day with meals, Historical Med      magnesium oxide (MAG-OX) 400 mg Take 1 tablet (400 mg total) by mouth daily, Starting Tue 11/6/2018, Normal      meclizine (ANTIVERT) 25 mg tablet Take 25 mg by mouth daily at bedtime, Starting Mon 8/27/2018, Historical Med      spironolactone (ALDACTONE) 50 mg tablet Take 1 tablet (50 mg total) by mouth daily, Starting Tue 11/6/2018, Normal      thiamine 100 MG tablet Take 100 mg by mouth daily, Historical Med      zinc gluconate 50 mg tablet Take 50 mg by mouth daily, Historical Med      potassium chloride 10 % Take 30 mL (40 mEq total) by mouth 4 (four) times a day, Starting Tue 11/6/2018, Normal           No discharge procedures on file      ED Provider  Electronically Signed by           Joe Sargent MD  11/25/18 7786       Joe Sargent MD  12/25/18 3957

## 2018-11-25 NOTE — DISCHARGE INSTRUCTIONS
Hyperkalemia   WHAT YOU NEED TO KNOW:   Hyperkalemia is a high level of potassium in your blood  Potassium helps control how your muscles, heart, and digestive system work  DISCHARGE INSTRUCTIONS:   Medicines:   · Medicines  will be given to remove potassium from your body  This will lower your potassium levels  This medicine may be given as a pill or an enema  · Take your medicine as directed  Contact your healthcare provider if you think your medicine is not helping or if you have side effects  Tell him of her if you are allergic to any medicine  Keep a list of the medicines, vitamins, and herbs you take  Include the amounts, and when and why you take them  Bring the list or the pill bottles to follow-up visits  Carry your medicine list with you in case of an emergency  Limit the amount of potassium you eat:  Foods that are high in potassium include bananas, tomatoes, oranges, turkey, and milk  Orange juice, citrus juices, and tomato juice are also high in potassium  Do not use salt substitutes  You may need to meet with a dietitian to help plan the best meals for you  Follow up with your healthcare provider as directed:  Write down your questions so you remember to ask them during your visits  Contact your healthcare provider if:   · You have nausea or are vomiting  · You have numbness or tingling in your arms or legs  · Your symptoms do not go away or they get worse  · You have questions or concerns about your condition or care  Return to the emergency department if:   · You have trouble breathing  · You have an irregular heartbeat  · You have trouble controlling your muscles  · You are too tired or weak to stand up  © 2017 [employer]  Information is for End User's use only and may not be sold, redistributed or otherwise used for commercial purposes   All illustrations and images included in CareNotes® are the copyrighted property of [employer]  or [employer]  The above information is an  only  It is not intended as medical advice for individual conditions or treatments  Talk to your doctor, nurse or pharmacist before following any medical regimen to see if it is safe and effective for you

## 2018-11-26 ENCOUNTER — HOSPITAL ENCOUNTER (OUTPATIENT)
Facility: HOSPITAL | Age: 33
Setting detail: OBSERVATION
Discharge: HOME | End: 2018-11-29
Attending: INTERNAL MEDICINE | Admitting: INTERNAL MEDICINE
Payer: COMMERCIAL

## 2018-11-26 DIAGNOSIS — E87.6 HYPOKALEMIA: Primary | ICD-10-CM

## 2018-11-26 DIAGNOSIS — G47.33 OSA (OBSTRUCTIVE SLEEP APNEA): ICD-10-CM

## 2018-11-26 LAB
ALBUMIN SERPL BCP-MCNC: 3.7 G/DL (ref 3.5–5)
ALP SERPL-CCNC: 45 U/L (ref 46–116)
ALT SERPL W P-5'-P-CCNC: 31 U/L (ref 12–78)
ANION GAP SERPL CALCULATED.3IONS-SCNC: 13 MMOL/L (ref 4–13)
AST SERPL W P-5'-P-CCNC: 19 U/L (ref 5–45)
ATRIAL RATE: 77 BPM
BASOPHILS # BLD AUTO: 0.06 THOUSANDS/ΜL (ref 0–0.1)
BASOPHILS NFR BLD AUTO: 1 % (ref 0–1)
BILIRUB SERPL-MCNC: 0.2 MG/DL (ref 0.2–1)
BUN SERPL-MCNC: 17 MG/DL (ref 5–25)
CALCIUM SERPL-MCNC: 8.4 MG/DL (ref 8.3–10.1)
CHLORIDE SERPL-SCNC: 107 MMOL/L (ref 100–108)
CK SERPL-CCNC: 69 U/L (ref 26–192)
CO2 SERPL-SCNC: 24 MMOL/L (ref 21–32)
CREAT SERPL-MCNC: 1.02 MG/DL (ref 0.6–1.3)
EOSINOPHIL # BLD AUTO: 0.18 THOUSAND/ΜL (ref 0–0.61)
EOSINOPHIL NFR BLD AUTO: 3 % (ref 0–6)
ERYTHROCYTE [DISTWIDTH] IN BLOOD BY AUTOMATED COUNT: 15.1 % (ref 11.6–15.1)
GFR SERPL CREATININE-BSD FRML MDRD: 72 ML/MIN/1.73SQ M
GLUCOSE SERPL-MCNC: 78 MG/DL (ref 65–140)
HCT VFR BLD AUTO: 40 % (ref 34.8–46.1)
HGB BLD-MCNC: 13.6 G/DL (ref 11.5–15.4)
IMM GRANULOCYTES # BLD AUTO: 0.02 THOUSAND/UL (ref 0–0.2)
IMM GRANULOCYTES NFR BLD AUTO: 0 % (ref 0–2)
LACTATE SERPL-SCNC: 1.1 MMOL/L (ref 0.5–2)
LYMPHOCYTES # BLD AUTO: 2.7 THOUSANDS/ΜL (ref 0.6–4.47)
LYMPHOCYTES NFR BLD AUTO: 43 % (ref 14–44)
MAGNESIUM SERPL-MCNC: 2.5 MG/DL (ref 1.6–2.6)
MCH RBC QN AUTO: 30.4 PG (ref 26.8–34.3)
MCHC RBC AUTO-ENTMCNC: 34 G/DL (ref 31.4–37.4)
MCV RBC AUTO: 90 FL (ref 82–98)
MONOCYTES # BLD AUTO: 0.49 THOUSAND/ΜL (ref 0.17–1.22)
MONOCYTES NFR BLD AUTO: 8 % (ref 4–12)
NEUTROPHILS # BLD AUTO: 2.84 THOUSANDS/ΜL (ref 1.85–7.62)
NEUTS SEG NFR BLD AUTO: 45 % (ref 43–75)
NRBC BLD AUTO-RTO: 0 /100 WBCS
P AXIS: 54 DEGREES
PHOSPHATE SERPL-MCNC: 3.8 MG/DL (ref 2.7–4.5)
PLATELET # BLD AUTO: 317 THOUSANDS/UL (ref 149–390)
PMV BLD AUTO: 10.3 FL (ref 8.9–12.7)
POTASSIUM SERPL-SCNC: 2.4 MMOL/L (ref 3.5–5.3)
PR INTERVAL: 130 MS
PROT SERPL-MCNC: 7.4 G/DL (ref 6.4–8.2)
QRS AXIS: 44 DEGREES
QRSD INTERVAL: 102 MS
QT INTERVAL: 374 MS
QTC INTERVAL: 423 MS
RBC # BLD AUTO: 4.47 MILLION/UL (ref 3.81–5.12)
SODIUM SERPL-SCNC: 144 MMOL/L (ref 136–145)
T WAVE AXIS: 57 DEGREES
TROPONIN I SERPL-MCNC: <0.02 NG/ML
VENTRICULAR RATE: 77 BPM
WBC # BLD AUTO: 6.29 THOUSAND/UL (ref 4.31–10.16)

## 2018-11-26 PROCEDURE — 84145 PROCALCITONIN (PCT): CPT | Performed by: INTERNAL MEDICINE

## 2018-11-26 PROCEDURE — 93005 ELECTROCARDIOGRAM TRACING: CPT

## 2018-11-26 PROCEDURE — 84484 ASSAY OF TROPONIN QUANT: CPT | Performed by: INTERNAL MEDICINE

## 2018-11-26 PROCEDURE — 80053 COMPREHEN METABOLIC PANEL: CPT | Performed by: INTERNAL MEDICINE

## 2018-11-26 PROCEDURE — 83735 ASSAY OF MAGNESIUM: CPT | Performed by: INTERNAL MEDICINE

## 2018-11-26 PROCEDURE — 82550 ASSAY OF CK (CPK): CPT | Performed by: INTERNAL MEDICINE

## 2018-11-26 PROCEDURE — 84100 ASSAY OF PHOSPHORUS: CPT | Performed by: INTERNAL MEDICINE

## 2018-11-26 PROCEDURE — 99220 PR INITIAL OBSERVATION CARE/DAY 70 MINUTES: CPT | Performed by: INTERNAL MEDICINE

## 2018-11-26 PROCEDURE — 93010 ELECTROCARDIOGRAM REPORT: CPT | Performed by: INTERNAL MEDICINE

## 2018-11-26 PROCEDURE — 85025 COMPLETE CBC W/AUTO DIFF WBC: CPT | Performed by: INTERNAL MEDICINE

## 2018-11-26 PROCEDURE — 83605 ASSAY OF LACTIC ACID: CPT | Performed by: INTERNAL MEDICINE

## 2018-11-26 RX ORDER — ZINC GLUCONATE 50 MG
50 TABLET ORAL DAILY
Status: DISCONTINUED | OUTPATIENT
Start: 2018-11-27 | End: 2018-11-29 | Stop reason: HOSPADM

## 2018-11-26 RX ORDER — AMITRIPTYLINE HYDROCHLORIDE 50 MG/1
50 TABLET, FILM COATED ORAL
Status: DISCONTINUED | OUTPATIENT
Start: 2018-11-26 | End: 2018-11-29 | Stop reason: HOSPADM

## 2018-11-26 RX ORDER — THIAMINE MONONITRATE (VIT B1) 100 MG
100 TABLET ORAL DAILY
Status: DISCONTINUED | OUTPATIENT
Start: 2018-11-27 | End: 2018-11-29 | Stop reason: HOSPADM

## 2018-11-26 RX ORDER — POTASSIUM CHLORIDE AND SODIUM CHLORIDE 900; 300 MG/100ML; MG/100ML
125 INJECTION, SOLUTION INTRAVENOUS CONTINUOUS
Status: DISCONTINUED | OUTPATIENT
Start: 2018-11-26 | End: 2018-11-29

## 2018-11-26 RX ORDER — MECLIZINE HYDROCHLORIDE 25 MG/1
25 TABLET ORAL
Status: DISCONTINUED | OUTPATIENT
Start: 2018-11-26 | End: 2018-11-29 | Stop reason: HOSPADM

## 2018-11-26 RX ORDER — SPIRONOLACTONE 25 MG/1
50 TABLET ORAL DAILY
Status: DISCONTINUED | OUTPATIENT
Start: 2018-11-27 | End: 2018-11-29 | Stop reason: HOSPADM

## 2018-11-26 RX ORDER — POTASSIUM CHLORIDE 14.9 MG/ML
20 INJECTION INTRAVENOUS
Status: COMPLETED | OUTPATIENT
Start: 2018-11-26 | End: 2018-11-26

## 2018-11-26 RX ORDER — ONDANSETRON 2 MG/ML
4 INJECTION INTRAMUSCULAR; INTRAVENOUS EVERY 6 HOURS PRN
Status: DISCONTINUED | OUTPATIENT
Start: 2018-11-26 | End: 2018-11-29 | Stop reason: HOSPADM

## 2018-11-26 RX ORDER — FERROUS SULFATE 325(65) MG
325 TABLET ORAL 2 TIMES DAILY WITH MEALS
Status: DISCONTINUED | OUTPATIENT
Start: 2018-11-26 | End: 2018-11-29 | Stop reason: HOSPADM

## 2018-11-26 RX ORDER — CHOLECALCIFEROL (VITAMIN D3) 10 MCG
1 TABLET ORAL
Status: DISCONTINUED | OUTPATIENT
Start: 2018-11-26 | End: 2018-11-29 | Stop reason: HOSPADM

## 2018-11-26 RX ORDER — ACETAMINOPHEN 325 MG/1
650 TABLET ORAL EVERY 6 HOURS PRN
Status: DISCONTINUED | OUTPATIENT
Start: 2018-11-26 | End: 2018-11-29 | Stop reason: HOSPADM

## 2018-11-26 RX ADMIN — POTASSIUM CHLORIDE 20 MEQ: 200 INJECTION, SOLUTION INTRAVENOUS at 21:35

## 2018-11-26 RX ADMIN — POTASSIUM CHLORIDE 20 MEQ: 200 INJECTION, SOLUTION INTRAVENOUS at 20:36

## 2018-11-26 RX ADMIN — MECLIZINE HYDROCHLORIDE 25 MG: 25 TABLET ORAL at 21:35

## 2018-11-26 RX ADMIN — Medication 1 CAPSULE: at 18:20

## 2018-11-26 RX ADMIN — AMITRIPTYLINE HYDROCHLORIDE 50 MG: 50 TABLET, FILM COATED ORAL at 21:35

## 2018-11-26 RX ADMIN — POTASSIUM CHLORIDE AND SODIUM CHLORIDE 125 ML/HR: 900; 300 INJECTION, SOLUTION INTRAVENOUS at 18:48

## 2018-11-26 RX ADMIN — POTASSIUM CHLORIDE 20 MEQ: 200 INJECTION, SOLUTION INTRAVENOUS at 18:54

## 2018-11-26 RX ADMIN — FERROUS SULFATE TAB 325 MG (65 MG ELEMENTAL FE) 325 MG: 325 (65 FE) TAB at 18:19

## 2018-11-26 NOTE — H&P
H&P- Kaitlin Blanca 1985, 28 y o  female MRN: [id number]    Unit/Bed#: 443-82 Encounter: [id number]    Primary Care Provider: ARIADNA Rolon   Date and time admitted to hospital: 11/26/2018  4:53 PM        * Hypokalemia   Assessment & Plan    · Place in 23-hour observation, med/surg level of care with telemetry monitoring in the setting of hypokalemia  · The patient has been taking 5 doses of 40 meQ KCL supplementation daily for a total of 200 meQ potassium chloride oral supplementation daily for the last 3 days  · Replete with IV potassium chloride 20 meQ x 3 doses now, and initiate NSS IV fluids with 40 meQ KCl at 125 ml/hr  · Check labs now  · Check an EKG  · Consult Nephrology for hypokalemia management  · Possible outpatient referral for the patient to a higher level of care/specialty center to see a hypokalemia expert  · Follow the potassium level and magnesium level closely     Paresthesias   Assessment & Plan    · Secondary to severe hypokalemia  · Paresthesias symptoms correlate with her hypokalemia     WAGNER (obstructive sleep apnea)   Assessment & Plan    · Check an overnight sleep screen  · Outpatient sleep study and follow-up with her PCP in regards to this matter     Vitamin D deficiency   Assessment & Plan    · Continue ergocalciferol replacement therapy  · Outpatient follow-up with her PCP in regards to this matter     History of gastric bypass   Assessment & Plan    · History of gastric bypass in 2014  · Outpatient follow-up with Bariatric Surgery           VTE Prophylaxis: Enoxaparin (Lovenox)  / reason for no mechanical VTE prophylaxis Low VTE risk   Code Status:  Level 1-Full Code      Anticipated Length of Stay:  Patient will be admitted on an Observation basis with an anticipated length of stay of less than 2 midnights     Justification for Hospital Stay:  The patient requires IV potassium chloride supplementation, telemetry monitoring for the hypokalemia, and requires serial laboratory testing to monitor her potassium level  Total Time for Visit, including Counseling / Coordination of Care: 45 minutes  Greater than 50% of this total time spent on direct patient counseling and coordination of care  Chief Complaint:  Paresthesias of the face    History of Present Illness:    Em Laird is a 28 y o  female who presents as a direct admission from Dr Eboni Chavez (Nephrology) for recurrent hypokalemia  The patient was first diagnosed with symptomatic hypokalemia in July of 2018  She has a history of gastric bypass in 2014  Since July of 2018, she was been on daily PO potassium chloride supplementation  She has required multiple admissions for symptomatic hypokalemia  Last week, the patient started to developed paresthesias of her bilateral hands and bilateral feet, which usually correlates with worsening hypokalemia for the patient  Over the last 6 days, the patient has been taking 40 meQ of potassium chloride 5 times per day (200 meQ daily total)  The patient had traveled to [place] for the holidays last week and had to come back early secondary to her severe paresthesias  Over the last few days, the patient developed facial paresthesias, which usually correlates to more severe hypokalemia for the patient  The patient denies any nausea, vomiting, or diarrhea  She also denies any laxative use  Nothing seemed to relieve her symptoms  No chest pain  No shortness of breath  No fevers or chills  No abdominal pain  Review of Systems:    Review of Systems:  Per HPI, all other systems have been reviewed and were negative      Past Medical and Surgical History:     Past Medical History:   Diagnosis Date    Hypokalemia     Migraine        Past Surgical History:   Procedure Laterality Date    ABDOMINAL SURGERY      APPENDECTOMY      CHOLECYSTECTOMY      GASTRIC BYPASS      HYSTERECTOMY         Meds/Allergies:    Prior to Admission medications    Medication Sig Start Date End Date Taking? Authorizing Provider   amitriptyline (ELAVIL) 50 mg tablet Take 50 mg by mouth daily at bedtime 8/27/18 8/27/19 Yes Historical Provider, MD   b complex-C-folic acid (NEPHRO-EDIE) 0 8 mg tablet Take 0 8 mg by mouth daily with dinner   Yes Historical Provider, MD   ferrous sulfate 325 (65 Fe) mg tablet Take 325 mg by mouth 2 (two) times a day with meals   Yes Historical Provider, MD   magnesium oxide (MAG-OX) 400 mg Take 1 tablet (400 mg total) by mouth daily 11/6/18  Yes Chris Yin MD   meclizine (ANTIVERT) 25 mg tablet Take 25 mg by mouth daily at bedtime 8/27/18  Yes Historical Provider, MD   potassium chloride 10 % Take 30 mL (40 mEq total) by mouth 4 (four) times a day 11/6/18  Yes Chris Yin MD   spironolactone (ALDACTONE) 50 mg tablet Take 1 tablet (50 mg total) by mouth daily 11/6/18  Yes Chris Yin MD   thiamine 100 MG tablet Take 100 mg by mouth daily   Yes Historical Provider, MD   zinc gluconate 50 mg tablet Take 50 mg by mouth daily   Yes Historical Provider, MD   ergocalciferol (ERGOCALCIFEROL) 85016 units capsule Take 50,000 Units by mouth once a week    Historical Provider, MD     I have reviewed home medications with patient personally  Allergies: Allergies   Allergen Reactions    Nsaids      Other reaction(s):  Other (Please comment)  Should not take s/p bariatric surgery       Social History:     Marital Status: /Civil Union     Substance Use History:   History   Alcohol Use    Yes     Comment: socially     History   Smoking Status    Never Smoker   Smokeless Tobacco    Never Used     History   Drug Use No       Family History:    non-contributory    Physical Exam:     Vitals:   Blood Pressure: 111/71 (11/26/18 1704)  Pulse: 93 (11/26/18 1704)  Temperature: 98 °F (36 7 °C) (11/26/18 1704)  Temp Source: Temporal (11/26/18 1704)  Respirations: 16 (11/26/18 1704)  SpO2: 100 % (11/26/18 1704)    Physical Exam  General:  NAD, awake, alert, oriented x 3  HEENT:  NC/AT, mucous membranes moist  Neck:  Supple, No JVP elevation  CV:  + S1, + S2, RRR  Pulm:  Lung fields are CTA bilaterally  Abd:  Soft, Non-tender, Non-distended  Ext:  No clubbing/cyanosis/edema  Skin:  No rashes    Additional Data:     Lab Results: I have personally reviewed pertinent reports  Results from last 7 days  Lab Units 11/26/18  1757   WBC Thousand/uL 6 29   HEMOGLOBIN g/dL 13 6   HEMATOCRIT % 40 0   PLATELETS Thousands/uL 317   NEUTROS PCT % 45   LYMPHS PCT % 43   MONOS PCT % 8   EOS PCT % 3       Results from last 7 days  Lab Units 11/25/18  1515   SODIUM mmol/L 138   POTASSIUM mmol/L 3 2*   CHLORIDE mmol/L 105   CO2 mmol/L 23   BUN mg/dL 15   CREATININE mg/dL 0 93   ANION GAP mmol/L 10   CALCIUM mg/dL 8 3   GLUCOSE RANDOM mg/dL 85                       Imaging: I have personally reviewed pertinent reports  No orders to display       EKG, Pathology, and Other Studies Reviewed on Admission:   · EKG (11/26/2018): Normal sinus rhythm with a heart rate of 77 bpm;  Flattened T waves V3-V6    Allscripts / Epic Records Reviewed: Yes     ** Please Note: This note has been constructed using a voice recognition system   **

## 2018-11-27 LAB
25(OH)D3 SERPL-MCNC: 14.8 NG/ML (ref 30–100)
ALBUMIN SERPL BCP-MCNC: 3.1 G/DL (ref 3.5–5)
ALP SERPL-CCNC: 38 U/L (ref 46–116)
ALT SERPL W P-5'-P-CCNC: 25 U/L (ref 12–78)
ANION GAP SERPL CALCULATED.3IONS-SCNC: 11 MMOL/L (ref 4–13)
ANION GAP SERPL CALCULATED.3IONS-SCNC: 13 MMOL/L (ref 4–13)
AST SERPL W P-5'-P-CCNC: 17 U/L (ref 5–45)
BASOPHILS # BLD AUTO: 0.04 THOUSANDS/ΜL (ref 0–0.1)
BASOPHILS NFR BLD AUTO: 1 % (ref 0–1)
BILIRUB SERPL-MCNC: 0.3 MG/DL (ref 0.2–1)
BUN SERPL-MCNC: 13 MG/DL (ref 5–25)
BUN SERPL-MCNC: 14 MG/DL (ref 5–25)
CALCIUM SERPL-MCNC: 7.8 MG/DL (ref 8.3–10.1)
CALCIUM SERPL-MCNC: 8.1 MG/DL (ref 8.3–10.1)
CHLORIDE SERPL-SCNC: 108 MMOL/L (ref 100–108)
CHLORIDE SERPL-SCNC: 110 MMOL/L (ref 100–108)
CO2 SERPL-SCNC: 20 MMOL/L (ref 21–32)
CO2 SERPL-SCNC: 20 MMOL/L (ref 21–32)
CREAT SERPL-MCNC: 0.81 MG/DL (ref 0.6–1.3)
CREAT SERPL-MCNC: 0.92 MG/DL (ref 0.6–1.3)
CREAT UR-MCNC: 150 MG/DL
EOSINOPHIL # BLD AUTO: 0.18 THOUSAND/ΜL (ref 0–0.61)
EOSINOPHIL NFR BLD AUTO: 3 % (ref 0–6)
ERYTHROCYTE [DISTWIDTH] IN BLOOD BY AUTOMATED COUNT: 15.5 % (ref 11.6–15.1)
FOLATE SERPL-MCNC: >20 NG/ML (ref 3.1–17.5)
GFR SERPL CREATININE-BSD FRML MDRD: 81 ML/MIN/1.73SQ M
GFR SERPL CREATININE-BSD FRML MDRD: 95 ML/MIN/1.73SQ M
GLUCOSE SERPL-MCNC: 100 MG/DL (ref 65–140)
GLUCOSE SERPL-MCNC: 80 MG/DL (ref 65–140)
HCT VFR BLD AUTO: 38.9 % (ref 34.8–46.1)
HGB BLD-MCNC: 12.8 G/DL (ref 11.5–15.4)
IMM GRANULOCYTES # BLD AUTO: 0.01 THOUSAND/UL (ref 0–0.2)
IMM GRANULOCYTES NFR BLD AUTO: 0 % (ref 0–2)
LYMPHOCYTES # BLD AUTO: 2.35 THOUSANDS/ΜL (ref 0.6–4.47)
LYMPHOCYTES NFR BLD AUTO: 40 % (ref 14–44)
MAGNESIUM SERPL-MCNC: 2.3 MG/DL (ref 1.6–2.6)
MCH RBC QN AUTO: 30.2 PG (ref 26.8–34.3)
MCHC RBC AUTO-ENTMCNC: 32.9 G/DL (ref 31.4–37.4)
MCV RBC AUTO: 92 FL (ref 82–98)
MONOCYTES # BLD AUTO: 0.48 THOUSAND/ΜL (ref 0.17–1.22)
MONOCYTES NFR BLD AUTO: 8 % (ref 4–12)
NEUTROPHILS # BLD AUTO: 2.81 THOUSANDS/ΜL (ref 1.85–7.62)
NEUTS SEG NFR BLD AUTO: 48 % (ref 43–75)
NRBC BLD AUTO-RTO: 0 /100 WBCS
PHOSPHATE SERPL-MCNC: 3 MG/DL (ref 2.7–4.5)
PLATELET # BLD AUTO: 265 THOUSANDS/UL (ref 149–390)
PMV BLD AUTO: 10.3 FL (ref 8.9–12.7)
POTASSIUM SERPL-SCNC: 2.9 MMOL/L (ref 3.5–5.3)
POTASSIUM SERPL-SCNC: 3.6 MMOL/L (ref 3.5–5.3)
POTASSIUM UR-SCNC: 18 MMOL/L (ref 1–300)
PROCALCITONIN SERPL-MCNC: <0.05 NG/ML
PROCALCITONIN SERPL-MCNC: <0.05 NG/ML
PROT SERPL-MCNC: 6.4 G/DL (ref 6.4–8.2)
RBC # BLD AUTO: 4.24 MILLION/UL (ref 3.81–5.12)
SODIUM 24H UR-SCNC: 44 MOL/L
SODIUM SERPL-SCNC: 141 MMOL/L (ref 136–145)
SODIUM SERPL-SCNC: 141 MMOL/L (ref 136–145)
TROPONIN I SERPL-MCNC: <0.02 NG/ML
TSH SERPL DL<=0.05 MIU/L-ACNC: 3.24 UIU/ML (ref 0.36–3.74)
UUN 24H UR-MCNC: 882 MG/DL
VIT B12 SERPL-MCNC: 133 PG/ML (ref 100–900)
WBC # BLD AUTO: 5.87 THOUSAND/UL (ref 4.31–10.16)

## 2018-11-27 PROCEDURE — 85025 COMPLETE CBC W/AUTO DIFF WBC: CPT | Performed by: INTERNAL MEDICINE

## 2018-11-27 PROCEDURE — 82607 VITAMIN B-12: CPT | Performed by: INTERNAL MEDICINE

## 2018-11-27 PROCEDURE — 84443 ASSAY THYROID STIM HORMONE: CPT | Performed by: INTERNAL MEDICINE

## 2018-11-27 PROCEDURE — 80048 BASIC METABOLIC PNL TOTAL CA: CPT | Performed by: INTERNAL MEDICINE

## 2018-11-27 PROCEDURE — 84540 ASSAY OF URINE/UREA-N: CPT | Performed by: INTERNAL MEDICINE

## 2018-11-27 PROCEDURE — 83735 ASSAY OF MAGNESIUM: CPT | Performed by: INTERNAL MEDICINE

## 2018-11-27 PROCEDURE — 84100 ASSAY OF PHOSPHORUS: CPT | Performed by: INTERNAL MEDICINE

## 2018-11-27 PROCEDURE — 82746 ASSAY OF FOLIC ACID SERUM: CPT | Performed by: INTERNAL MEDICINE

## 2018-11-27 PROCEDURE — 84133 ASSAY OF URINE POTASSIUM: CPT | Performed by: INTERNAL MEDICINE

## 2018-11-27 PROCEDURE — 99226 PR SBSQ OBSERVATION CARE/DAY 35 MINUTES: CPT | Performed by: INTERNAL MEDICINE

## 2018-11-27 PROCEDURE — 94762 N-INVAS EAR/PLS OXIMTRY CONT: CPT

## 2018-11-27 PROCEDURE — 94760 N-INVAS EAR/PLS OXIMETRY 1: CPT

## 2018-11-27 PROCEDURE — 84207 ASSAY OF VITAMIN B-6: CPT | Performed by: INTERNAL MEDICINE

## 2018-11-27 PROCEDURE — 84484 ASSAY OF TROPONIN QUANT: CPT | Performed by: INTERNAL MEDICINE

## 2018-11-27 PROCEDURE — 82306 VITAMIN D 25 HYDROXY: CPT | Performed by: INTERNAL MEDICINE

## 2018-11-27 PROCEDURE — 84145 PROCALCITONIN (PCT): CPT | Performed by: INTERNAL MEDICINE

## 2018-11-27 PROCEDURE — 84300 ASSAY OF URINE SODIUM: CPT | Performed by: INTERNAL MEDICINE

## 2018-11-27 PROCEDURE — 80053 COMPREHEN METABOLIC PANEL: CPT | Performed by: INTERNAL MEDICINE

## 2018-11-27 PROCEDURE — 84425 ASSAY OF VITAMIN B-1: CPT | Performed by: INTERNAL MEDICINE

## 2018-11-27 PROCEDURE — 82570 ASSAY OF URINE CREATININE: CPT | Performed by: INTERNAL MEDICINE

## 2018-11-27 RX ORDER — POTASSIUM CHLORIDE 20MEQ/15ML
40 LIQUID (ML) ORAL 4 TIMES DAILY
Status: DISCONTINUED | OUTPATIENT
Start: 2018-11-27 | End: 2018-11-29 | Stop reason: HOSPADM

## 2018-11-27 RX ORDER — POTASSIUM CHLORIDE 14.9 MG/ML
20 INJECTION INTRAVENOUS
Status: COMPLETED | OUTPATIENT
Start: 2018-11-27 | End: 2018-11-28

## 2018-11-27 RX ADMIN — Medication 1 CAPSULE: at 16:15

## 2018-11-27 RX ADMIN — MECLIZINE HYDROCHLORIDE 25 MG: 25 TABLET ORAL at 21:45

## 2018-11-27 RX ADMIN — POTASSIUM CHLORIDE 20 MEQ: 200 INJECTION, SOLUTION INTRAVENOUS at 12:05

## 2018-11-27 RX ADMIN — AMITRIPTYLINE HYDROCHLORIDE 50 MG: 50 TABLET, FILM COATED ORAL at 21:45

## 2018-11-27 RX ADMIN — Medication 50 MG: at 12:05

## 2018-11-27 RX ADMIN — FERROUS SULFATE TAB 325 MG (65 MG ELEMENTAL FE) 325 MG: 325 (65 FE) TAB at 08:15

## 2018-11-27 RX ADMIN — POTASSIUM CHLORIDE 40 MEQ: 20 SOLUTION ORAL at 14:42

## 2018-11-27 RX ADMIN — Medication 100 MG: at 08:15

## 2018-11-27 RX ADMIN — MAGNESIUM OXIDE TAB 400 MG (241.3 MG ELEMENTAL MG) 400 MG: 400 (241.3 MG) TAB at 08:15

## 2018-11-27 RX ADMIN — FERROUS SULFATE TAB 325 MG (65 MG ELEMENTAL FE) 325 MG: 325 (65 FE) TAB at 16:15

## 2018-11-27 RX ADMIN — POTASSIUM CHLORIDE 20 MEQ: 200 INJECTION, SOLUTION INTRAVENOUS at 14:32

## 2018-11-27 RX ADMIN — POTASSIUM CHLORIDE 40 MEQ: 20 SOLUTION ORAL at 17:38

## 2018-11-27 RX ADMIN — POTASSIUM CHLORIDE 40 MEQ: 20 SOLUTION ORAL at 21:45

## 2018-11-27 RX ADMIN — POTASSIUM CHLORIDE AND SODIUM CHLORIDE 125 ML/HR: 900; 300 INJECTION, SOLUTION INTRAVENOUS at 20:02

## 2018-11-27 RX ADMIN — POTASSIUM CHLORIDE AND SODIUM CHLORIDE 125 ML/HR: 900; 300 INJECTION, SOLUTION INTRAVENOUS at 03:09

## 2018-11-27 RX ADMIN — ACETAMINOPHEN 650 MG: 325 TABLET, FILM COATED ORAL at 12:04

## 2018-11-27 RX ADMIN — POTASSIUM CHLORIDE 20 MEQ: 200 INJECTION, SOLUTION INTRAVENOUS at 10:03

## 2018-11-27 RX ADMIN — POTASSIUM CHLORIDE AND SODIUM CHLORIDE 125 ML/HR: 900; 300 INJECTION, SOLUTION INTRAVENOUS at 12:11

## 2018-11-27 NOTE — PLAN OF CARE
Problem: DISCHARGE PLANNING - CARE MANAGEMENT  Goal: Discharge to post-acute care or home with appropriate resources  INTERVENTIONS:  - Conduct assessment to determine patient/family and health care team treatment goals, and need for post-acute services based on payer coverage, community resources, and patient preferences, and barriers to discharge  - Address psychosocial, clinical, and financial barriers to discharge as identified in assessment in conjunction with the patient/family and health care team  - Arrange appropriate level of post-acute services according to patient's   needs and preference and payer coverage in collaboration with the physician and health care team  - Communicate with and update the patient/family, physician, and health care team regarding progress on the discharge plan  - Arrange appropriate transportation to post-acute venues  Outcome: Progressing  -outpatient follow up on dc

## 2018-11-27 NOTE — PROGRESS NOTES
Progress Note - Hannah Min 1985, 28 y o  female MRN: [id number]    Unit/Bed#: 048-92 Encounter: [id number]    Primary Care Provider: ARIADNA Conroy   Date and time admitted to hospital: 11/26/2018  4:53 PM        * Hypokalemia   Assessment & Plan    · Place in 23-hour observation, med/surg level of care with telemetry monitoring in the setting of hypokalemia  · The patient has been taking 5 doses of 40 meQ KCL supplementation daily for a total of 200 meQ potassium chloride oral supplementation daily for the last 3 days  · Replete with IV potassium chloride 20 meQ x 3 doses now, and initiate NSS IV fluids with 40 meQ KCl at 125 ml/hr  · Check labs now  · Check an EKG  · Consult Nephrology  · Possible outpatient referral for the patient to a higher level of care/specialty center to see a hypokalemia expert  · Follow the potassium level and magnesium level closely    11/27/18:  - K+ today is 2 9, increased slightly from 2 4 yesterday   - Patient still complains of perioral numbness and bilateral paresthesia up to knee  - Today will repeat IV potassium chloride 20 meQ x 3 doses and repeat BMP at 4pm today to follow up K+ level  - Continue with NS 0 9 IVF with 40 mEq KCl @ 125 cc/hr  - No EKG changes on 11/26  - Consider obtaining urine K+ to Cr ratio to determine hypokalemia due to extrarenal vs  Renal K+ loss     Paresthesias   Assessment & Plan    · Secondary to severe hypokalemia  · Paresthesias symptoms correlate with her hypokalemia  · Patient still has paresthesias in bilateral LLE today (11/27)     WAGNER (obstructive sleep apnea)   Assessment & Plan    · Check an overnight sleep screen  · Outpatient sleep study and follow-up with her PCP in regards to this matter     Vitamin D deficiency   Assessment & Plan    · Continue ergocalciferol replacement therapy  · Outpatient follow-up with her PCP in regards to this matter     History of gastric bypass   Assessment & Plan    · History of gastric bypass in 2014  · Outpatient follow-up with Bariatric Surgery           Subjective:   Patient is seen and examined at beside  Patient reports perioral numbness and bilateral paresthesia in bilateral foot up to her knee  Patient's potassium level improved from 2 4 to 2 9 today  She stated that her chest tightness went away which was present yesterday  EKG was done on 11/26/18 showed no EKG changes from hypokalemia  Patient vital signs are stable  She denied cramps, constipation, polyuria, fatigue, chest pain at this time  Objective:     Vitals: Blood pressure 106/61, pulse 70, temperature 97 8 °F (36 6 °C), temperature source Temporal, resp  rate 18, SpO2 100 %  ,There is no height or weight on file to calculate BMI  Wt Readings from Last 3 Encounters:   11/25/18 72 1 kg (158 lb 15 2 oz)   11/06/18 72 1 kg (158 lb 15 2 oz)   10/22/18 68 9 kg (152 lb)     No intake or output data in the 24 hours ending 11/27/18 1056    Physical Exam:     Physical Exam   Constitutional: She is oriented to person, place, and time  She appears well-nourished  No distress  Eyes: Right eye exhibits no discharge  Left eye exhibits no discharge  No scleral icterus  Neck: Normal range of motion  Cardiovascular: Normal rate, regular rhythm and normal heart sounds  Exam reveals no friction rub  No murmur heard  Pulmonary/Chest: Effort normal and breath sounds normal  No respiratory distress  She has no wheezes  She exhibits no tenderness  Abdominal: Soft  Bowel sounds are normal  She exhibits no distension  There is no tenderness  There is no guarding  Musculoskeletal: Normal range of motion  She exhibits no edema or tenderness  Neurological: She is alert and oriented to person, place, and time  Psychiatric: She has a normal mood and affect   Her behavior is normal  Judgment and thought content normal        Recent Results (from the past 24 hour(s))   ECG 12 lead    Collection Time: 11/26/18  5:49 PM   Result Value Ref Range Ventricular Rate 77 BPM    Atrial Rate 77 BPM    ND Interval 130 ms    QRSD Interval 102 ms    QT Interval 374 ms    QTC Interval 423 ms    P [place] 54 degrees    QRS Axis 44 degrees    T Wave [place] 57 degrees   CBC and differential    Collection Time: 11/26/18  5:57 PM   Result Value Ref Range    WBC 6 29 4 31 - 10 16 Thousand/uL    RBC 4 47 3 81 - 5 12 Million/uL    Hemoglobin 13 6 11 5 - 15 4 g/dL    Hematocrit 40 0 34 8 - 46 1 %    MCV 90 82 - 98 fL    MCH 30 4 26 8 - 34 3 pg    MCHC 34 0 31 4 - 37 4 g/dL    RDW 15 1 11 6 - 15 1 %    MPV 10 3 8 9 - 12 7 fL    Platelets 628 041 - 935 Thousands/uL    nRBC 0 /100 WBCs    Neutrophils Relative 45 43 - 75 %    Immat GRANS % 0 0 - 2 %    Lymphocytes Relative 43 14 - 44 %    Monocytes Relative 8 4 - 12 %    Eosinophils Relative 3 0 - 6 %    Basophils Relative 1 0 - 1 %    Neutrophils Absolute 2 84 1 85 - 7 62 Thousands/µL    Immature Grans Absolute 0 02 0 00 - 0 20 Thousand/uL    Lymphocytes Absolute 2 70 0 60 - 4 47 Thousands/µL    Monocytes Absolute 0 49 0 17 - 1 22 Thousand/µL    Eosinophils Absolute 0 18 0 00 - 0 61 Thousand/µL    Basophils Absolute 0 06 0 00 - 0 10 Thousands/µL   Comprehensive metabolic panel    Collection Time: 11/26/18  5:57 PM   Result Value Ref Range    Sodium 144 136 - 145 mmol/L    Potassium 2 4 (LL) 3 5 - 5 3 mmol/L    Chloride 107 100 - 108 mmol/L    CO2 24 21 - 32 mmol/L    ANION GAP 13 4 - 13 mmol/L    BUN 17 5 - 25 mg/dL    Creatinine 1 02 0 60 - 1 30 mg/dL    Glucose 78 65 - 140 mg/dL    Calcium 8 4 8 3 - 10 1 mg/dL    AST 19 5 - 45 U/L    ALT 31 12 - 78 U/L    Alkaline Phosphatase 45 (L) 46 - 116 U/L    Total Protein 7 4 6 4 - 8 2 g/dL    Albumin 3 7 3 5 - 5 0 g/dL    Total Bilirubin 0 20 0 20 - 1 00 mg/dL    eGFR 72 ml/min/1 73sq m   Magnesium    Collection Time: 11/26/18  5:57 PM   Result Value Ref Range    Magnesium 2 5 1 6 - 2 6 mg/dL   Phosphorus    Collection Time: 11/26/18  5:57 PM   Result Value Ref Range    Phosphorus 3 8 2 7 - 4 5 mg/dL   Troponin I    Collection Time: 11/26/18  5:57 PM   Result Value Ref Range    Troponin I <0 02 <=0 04 ng/mL   Lactic acid, plasma    Collection Time: 11/26/18  5:57 PM   Result Value Ref Range    LACTIC ACID 1 1 0 5 - 2 0 mmol/L   CK (with reflex to MB)    Collection Time: 11/26/18  5:57 PM   Result Value Ref Range    Total CK 69 26 - 192 U/L   Procalcitonin    Collection Time: 11/26/18  5:57 PM   Result Value Ref Range    Procalcitonin <0 05 <=0 25 ng/ml   CBC and differential    Collection Time: 11/27/18  6:01 AM   Result Value Ref Range    WBC 5 87 4 31 - 10 16 Thousand/uL    RBC 4 24 3 81 - 5 12 Million/uL    Hemoglobin 12 8 11 5 - 15 4 g/dL    Hematocrit 38 9 34 8 - 46 1 %    MCV 92 82 - 98 fL    MCH 30 2 26 8 - 34 3 pg    MCHC 32 9 31 4 - 37 4 g/dL    RDW 15 5 (H) 11 6 - 15 1 %    MPV 10 3 8 9 - 12 7 fL    Platelets 376 775 - 136 Thousands/uL    nRBC 0 /100 WBCs    Neutrophils Relative 48 43 - 75 %    Immat GRANS % 0 0 - 2 %    Lymphocytes Relative 40 14 - 44 %    Monocytes Relative 8 4 - 12 %    Eosinophils Relative 3 0 - 6 %    Basophils Relative 1 0 - 1 %    Neutrophils Absolute 2 81 1 85 - 7 62 Thousands/µL    Immature Grans Absolute 0 01 0 00 - 0 20 Thousand/uL    Lymphocytes Absolute 2 35 0 60 - 4 47 Thousands/µL    Monocytes Absolute 0 48 0 17 - 1 22 Thousand/µL    Eosinophils Absolute 0 18 0 00 - 0 61 Thousand/µL    Basophils Absolute 0 04 0 00 - 0 10 Thousands/µL   Comprehensive metabolic panel    Collection Time: 11/27/18  6:01 AM   Result Value Ref Range    Sodium 141 136 - 145 mmol/L    Potassium 2 9 (L) 3 5 - 5 3 mmol/L    Chloride 108 100 - 108 mmol/L    CO2 20 (L) 21 - 32 mmol/L    ANION GAP 13 4 - 13 mmol/L    BUN 14 5 - 25 mg/dL    Creatinine 0 92 0 60 - 1 30 mg/dL    Glucose 80 65 - 140 mg/dL    Calcium 8 1 (L) 8 3 - 10 1 mg/dL    AST 17 5 - 45 U/L    ALT 25 12 - 78 U/L    Alkaline Phosphatase 38 (L) 46 - 116 U/L    Total Protein 6 4 6 4 - 8 2 g/dL    Albumin 3 1 (L) 3 5 - 5 0 g/dL Total Bilirubin 0 30 0 20 - 1 00 mg/dL    eGFR 81 ml/min/1 73sq m   Magnesium    Collection Time: 11/27/18  6:01 AM   Result Value Ref Range    Magnesium 2 3 1 6 - 2 6 mg/dL   Phosphorus    Collection Time: 11/27/18  6:01 AM   Result Value Ref Range    Phosphorus 3 0 2 7 - 4 5 mg/dL   TSH, 3rd generation    Collection Time: 11/27/18  6:01 AM   Result Value Ref Range    TSH 3RD GENERATON 3 238 0 358 - 3 740 uIU/mL   Troponin I    Collection Time: 11/27/18  6:01 AM   Result Value Ref Range    Troponin I <0 02 <=0 04 ng/mL       Current Facility-Administered Medications   Medication Dose Route Frequency Provider Last Rate Last Dose    acetaminophen (TYLENOL) tablet 650 mg  650 mg Oral Q6H PRN [place] Carlos, DO        amitriptyline (ELAVIL) tablet 50 mg  50 mg Oral HS [facility], DO   50 mg at 11/26/18 2135    b complex-vitamin C-folic acid (NEPHROCAPS) capsule 1 capsule  1 capsule Oral Daily With [facility] Mario, DO   1 capsule at 11/26/18 1820    enoxaparin (LOVENOX) subcutaneous injection 40 mg  40 mg Subcutaneous Q24H Beau Carlos, DO        ferrous sulfate tablet 325 mg  325 mg Oral BID With Meals [facility], DO   325 mg at 11/27/18 0815    magnesium oxide (MAG-OX) tablet 400 mg  400 mg Oral Daily Beau Carlos, DO   400 mg at 11/27/18 0815    meclizine (ANTIVERT) tablet 25 mg  25 mg Oral HS [facility], DO   25 mg at 11/26/18 2135    ondansetron (ZOFRAN) injection 4 mg  4 mg Intravenous Q6H PRN Beau Carlos, DO        potassium chloride 20 mEq IVPB (premix)  20 mEq Intravenous Peggye Brooks, DO 50 mL/hr at 11/27/18 1003 20 mEq at 11/27/18 1003    sodium chloride 0 9 % with KCl 40 mEq/L infusion (premix)  125 mL/hr Intravenous Continuous [place] Carlos,  mL/hr at 11/27/18 0309 125 mL/hr at 11/27/18 0309    spironolactone (ALDACTONE) tablet 50 mg  50 mg Oral Daily Beau Carlos,         thiamine (VITAMIN B1) tablet 100 mg  100 mg Oral Daily Joelene Poll, DO   100 mg at 11/27/18 4259    zinc gluconate tablet 50 mg  50 mg Oral Daily Joelene Poll, DO           Invasive Devices     Peripheral Intravenous Line            Peripheral IV 11/25/18 Right Arm 1 day    Peripheral IV 11/26/18 Right;Dorsal (posterior) Forearm less than 1 day                Lab, Imaging and other studies: I have personally reviewed pertinent reports      VTE Pharmacologic Prophylaxis: Enoxaparin (Lovenox)  VTE Mechanical Prophylaxis: sequential compression device    John Cota MD

## 2018-11-27 NOTE — CONSULTS
Chair changes and Consultation - Nephrology   Sylvain Brown 28 y o  female MRN: [id number]  Unit/Bed#: 114-09 Encounter: [id number]      A/P:  1  Hypokalemia: She had a workup for renal losses  She does not appear to have Fanconi syndrome (negative aminoaciduria)  Will repeat urinary potassium  She is currently on spironolactone 50mg which did help but not completely  Unfortunately, she is borderline hypotensive and was told to increase salt intake  This may help with urinary potassium conservation  Will check a urine sodium as well  It was felt that she had malabsorption due to gastric bypass  She has a history of vitamin D deficiency  Will check thiamine and pyridoxine levels as well in the morning prior to supplements  She has had normal magnesium levels in the past   She takes no other supplements or loop diuretics  She does not appear to have Bartters syndrome as acidotic or Gitelmans syndrome due to normal calcium and magnesium  Am considering salt tablets along with potassium liquid supplementation to see if this helps with conservation  Will check urine lytes first   She eats a very stringent diet due to the gastric bypass and effort to maintain a new low body weight  2  Migraine history: not on topiramate due to low potassium  3  Hypophosphatemia: follow and replete         Thank you for allowing us to participate in the care of your patient  Please feel free to contact us regarding the care of this patient, or any other questions/concerns that may be applicable      Patient Active Problem List   Diagnosis    Hypokalemia    History of gastric bypass    Migraine without aura and without status migrainosus, not intractable    Left-sided weakness    Hypophosphatemia    Anemia    Vitamin D deficiency    Weakness of both lower extremities    Weakness of both upper extremities    Elevated CPK    Vitamin B12 deficiency    Cervical lymphadenopathy    Generalized weakness    Paresthesia of both lower extremities    Paresthesias    B12 deficiency    Gastric bypass status for obesity    GERD (gastroesophageal reflux disease)    Migraine    WAGNER (obstructive sleep apnea)    Other intestinal malabsorption       History of Present Illness   Physician Requesting Consult: Teresita López DO  Reason for Consult / Principal Problem: hypokalemia  Hx and PE limited by:   HPI: Tiffany Young is a 28y o  year old female who presents with market hypokalemia  She had labs drawn last Monday through our office and had a potassium of 3 7  She then had repeat labs done prior to the holiday at her primary care physician's office and it was 3 2  She had had arranged for her and infusion of 40 mEq as IV in the short procedure unit, however, this was canceled due to the higher potassium level  She took an extra dose of potassium in addition to her normal 4 doses a day as she was going to [address] [place] [address] for the holiday  She became symptomatic with tingling and numbness of her hands and feet and started developing perioral paresthesias  She returned from [address] [place] [address] early on Sunday morning and presented to the emergency department later that afternoon with a low potassium  She has had an extensive workup for hypokalemia including a workup for Vanco knee syndrome  There was no evidence for amino aciduria  She takes 5 doses of oral potassium as an outpatient as well as 50 mg of spironolactone  In spite of this she does have kalliuresis  She does not take any other diuretics including loop diuretics  She takes vitamin D B12 and B complex vitamin  She was on high-dose vitamin-D for 4 weeks however this was stopped due to restoration of normal levels  She has a history of a gastric bypass 4 years ago and has lost a substantial amount of weight 175 lb  She does not follow with [facility] any longer as they said there was nothing more they could do for her    She does notice that when she is hypokalemic she developed alopecia and dermatitis as well as the neurologic symptoms  She denies any extrarenal losses such as nausea vomiting diarrhea  She is urinating well  She had normal thyroid function in the past and she is no longer on topiramate  History obtained from chart review and the patient    Review of Systems - Negative except as mentioned above in HPI, more specifics as mentioned below    Review of Systems - General ROS: positive for  - fatigue  Ophthalmic ROS: negative  ENT ROS: negative  Respiratory ROS: no cough, shortness of breath, or wheezing  Cardiovascular ROS: no chest pain or dyspnea on exertion  Gastrointestinal ROS: no abdominal pain, change in bowel habits, or black or bloody stools  Genito-Urinary ROS: no dysuria, trouble voiding, or hematuria  Musculoskeletal ROS: positive for - muscular weakness  Neurological ROS: no TIA or stroke symptoms  Dermatological ROS: positive for alopecia and dermatitis    Historical Information   Past Medical History:   Diagnosis Date    Hypokalemia     Migraine      Past Surgical History:   Procedure Laterality Date    ABDOMINAL SURGERY      APPENDECTOMY      CHOLECYSTECTOMY      GASTRIC BYPASS      HYSTERECTOMY       Social History   History   Alcohol Use    Yes     Comment: socially     History   Drug Use No     History   Smoking Status    Never Smoker   Smokeless Tobacco    Never Used     Family History   Problem Relation Age of Onset    Hypertension Father     Diabetes Father     Diabetes Maternal Grandfather        Meds/Allergies   all current active meds have been reviewed, current meds: Current Facility-Administered Medications   Medication Dose Route Frequency    acetaminophen (TYLENOL) tablet 650 mg  650 mg Oral Q6H PRN    amitriptyline (ELAVIL) tablet 50 mg  50 mg Oral HS    b complex-vitamin C-folic acid (NEPHROCAPS) capsule 1 capsule  1 capsule Oral Daily With Dinner    enoxaparin (LOVENOX) subcutaneous injection 40 mg  40 mg Subcutaneous Q24H    ferrous sulfate tablet 325 mg  325 mg Oral BID With Meals    magnesium oxide (MAG-OX) tablet 400 mg  400 mg Oral Daily    meclizine (ANTIVERT) tablet 25 mg  25 mg Oral HS    ondansetron (ZOFRAN) injection 4 mg  4 mg Intravenous Q6H PRN    potassium chloride 20 mEq IVPB (premix)  20 mEq Intravenous Q2H    sodium chloride 0 9 % with KCl 40 mEq/L infusion (premix)  125 mL/hr Intravenous Continuous    spironolactone (ALDACTONE) tablet 50 mg  50 mg Oral Daily    thiamine (VITAMIN B1) tablet 100 mg  100 mg Oral Daily    zinc gluconate tablet 50 mg  50 mg Oral Daily    and PTA meds:  Prescriptions Prior to Admission   Medication    amitriptyline (ELAVIL) 50 mg tablet    b complex-C-folic acid (NEPHRO-EDIE) 0 8 mg tablet    ferrous sulfate 325 (65 Fe) mg tablet    magnesium oxide (MAG-OX) 400 mg    meclizine (ANTIVERT) 25 mg tablet    potassium chloride 10 %    spironolactone (ALDACTONE) 50 mg tablet    thiamine 100 MG tablet    zinc gluconate 50 mg tablet    ergocalciferol (ERGOCALCIFEROL) 34814 units capsule         Allergies   Allergen Reactions    Nsaids      Other reaction(s): Other (Please comment)  Should not take s/p bariatric surgery       Objective   No intake or output data in the 24 hours ending 11/27/18 1138    Invasive Devices:        Physical Exam      No intake/output data recorded  Vitals:    11/27/18 0717   BP: 106/61   Pulse: 70   Resp: 18   Temp: 97 8 °F (36 6 °C)   SpO2: 100%       Gen: in NAD, Alert/Awake  HEENT: no sclerous icterus, MMM, neck supple  CV: +S1/S2, RRR  Lungs: CTA bilaterally  Abd: +BS, soft NT/ND  Ext: all four extremities are warm  Skin: no rashes noted  Neuro: CN II-XII intact    Current Weight:    First Weight:      Lab Results:  I have personally reviewed pertinent labs      CBC: Lab Results   Component Value Date    WBC 5 87 11/27/2018    HGB 12 8 11/27/2018    HCT 38 9 11/27/2018    MCV 92 11/27/2018     11/27/2018    MCH 30 2 11/27/2018 MCHC 32 9 11/27/2018    RDW 15 5 (H) 11/27/2018    MPV 10 3 11/27/2018    NRBC 0 11/27/2018     CMP: Lab Results   Component Value Date    K 2 9 (L) 11/27/2018     11/27/2018    CO2 20 (L) 11/27/2018    BUN 14 11/27/2018    CREATININE 0 92 11/27/2018    CALCIUM 8 1 (L) 11/27/2018    AST 17 11/27/2018    ALT 25 11/27/2018    ALKPHOS 38 (L) 11/27/2018    EGFR 81 11/27/2018     Phosphorus:   Lab Results   Component Value Date    PHOS 3 0 11/27/2018     Magnesium:   Lab Results   Component Value Date    MG 2 3 11/27/2018     Urinalysis: No results found for: Emi Stain, SPECGRAV, PHUR, LEUKOCYTESUR, NITRITE, PROTEINUA, GLUCOSEU, KETONESU, BILIRUBINUR, BLOODU  Ionized Calcium: No results found for: CAION  Coagulation: No results found for: PT, INR, APTT  Troponin:   Lab Results   Component Value Date    TROPONINI <0 02 11/27/2018     ABG: No results found for: PHART, PZM0RMV, PO2ART, KNC8FAQ, X6HXEVWZ, BEART, SOURCE      Results from last 7 days  Lab Units 11/27/18  0601 11/26/18  1757 11/25/18  1515   POTASSIUM mmol/L 2 9* 2 4* 3 2*   CHLORIDE mmol/L 108 107 105   CO2 mmol/L 20* 24 23   BUN mg/dL 14 17 15   CREATININE mg/dL 0 92 1 02 0 93   CALCIUM mg/dL 8 1* 8 4 8 3   ALK PHOS U/L 38* 45*  --    ALT U/L 25 31  --    AST U/L 17 19  --        Radiology review:  No results found  EKG, Pathology, and Other Studies: reviewed      Counseling / Coordination of Care  Total ADDITIONAL floor / unit time spent today 40 minutes  Greater than 50% of total time was spent with the patient and / or family counseling and / or coordination of care   A description of the counseling / coordination of care: follows    Donnie Garcia MD

## 2018-11-27 NOTE — SOCIAL WORK
Cm met with the patient to evaluate the patients prior function and living situation and any barriers to d/c and form a safe d/c plan  Cm also evaluated the patient for any services in the home or needs for services  Pt resides at home with her  in a house in [facility]  Single level home, 0 CARLOS   Pt is independent with her adls and ambulation  No services or DME  Pt and spouse both drive  PCp is Dr Juarez Hooker (saw Iftikhar Molina once in October of this year at [facility]  [facility] in [place] [address] but states that is too far for her to travel)  Pharmacy is [facility] in [facility]  Pt with frequent Er and hospital admissions due to hypokalemia  Has been in the process to try and get pt set up for outpatient infusion of potassium at [facility] based on her weekly lab draws  (Pt gets lab work done on Mondays and pending lab results plan was outpatient Iv Potassium infusion)  Hold us has been with pt's insurance([employer]) and the infusion not being in network  Cm contacted pt's insurance who faxed over a prior authorization request form for us to work on obtaining a single case agreement for pt to be able to come to out infusion center on an outpatient basis)  Rui completed the form and faxed over necessary clinical to [phone]  Clarke Francois will review and should be getting back to us by tomorrow with a determination

## 2018-11-28 LAB
ALBUMIN SERPL BCP-MCNC: 2.7 G/DL (ref 3.5–5)
ALP SERPL-CCNC: 34 U/L (ref 46–116)
ALT SERPL W P-5'-P-CCNC: 21 U/L (ref 12–78)
ANION GAP SERPL CALCULATED.3IONS-SCNC: 9 MMOL/L (ref 4–13)
ANION GAP SERPL CALCULATED.3IONS-SCNC: 9 MMOL/L (ref 4–13)
AST SERPL W P-5'-P-CCNC: 14 U/L (ref 5–45)
BASOPHILS # BLD AUTO: 0.04 THOUSANDS/ΜL (ref 0–0.1)
BASOPHILS NFR BLD AUTO: 1 % (ref 0–1)
BILIRUB SERPL-MCNC: 0.2 MG/DL (ref 0.2–1)
BILIRUB UR QL STRIP: NEGATIVE
BUN SERPL-MCNC: 7 MG/DL (ref 5–25)
BUN SERPL-MCNC: 9 MG/DL (ref 5–25)
CALCIUM SERPL-MCNC: 7.4 MG/DL (ref 8.3–10.1)
CALCIUM SERPL-MCNC: 7.5 MG/DL (ref 8.3–10.1)
CHLORIDE SERPL-SCNC: 113 MMOL/L (ref 100–108)
CHLORIDE SERPL-SCNC: 114 MMOL/L (ref 100–108)
CLARITY UR: NORMAL
CO2 SERPL-SCNC: 18 MMOL/L (ref 21–32)
CO2 SERPL-SCNC: 20 MMOL/L (ref 21–32)
COLOR UR: YELLOW
CREAT SERPL-MCNC: 0.69 MG/DL (ref 0.6–1.3)
CREAT SERPL-MCNC: 0.83 MG/DL (ref 0.6–1.3)
CREAT UR-MCNC: 58.5 MG/DL
EOSINOPHIL # BLD AUTO: 0.15 THOUSAND/ΜL (ref 0–0.61)
EOSINOPHIL NFR BLD AUTO: 3 % (ref 0–6)
ERYTHROCYTE [DISTWIDTH] IN BLOOD BY AUTOMATED COUNT: 15.8 % (ref 11.6–15.1)
GFR SERPL CREATININE-BSD FRML MDRD: 114 ML/MIN/1.73SQ M
GFR SERPL CREATININE-BSD FRML MDRD: 92 ML/MIN/1.73SQ M
GLUCOSE SERPL-MCNC: 79 MG/DL (ref 65–140)
GLUCOSE SERPL-MCNC: 79 MG/DL (ref 65–140)
GLUCOSE UR STRIP-MCNC: NEGATIVE MG/DL
HCT VFR BLD AUTO: 34.9 % (ref 34.8–46.1)
HGB BLD-MCNC: 11.2 G/DL (ref 11.5–15.4)
HGB UR QL STRIP.AUTO: NEGATIVE
IMM GRANULOCYTES # BLD AUTO: 0.01 THOUSAND/UL (ref 0–0.2)
IMM GRANULOCYTES NFR BLD AUTO: 0 % (ref 0–2)
KETONES UR STRIP-MCNC: NEGATIVE MG/DL
LEUKOCYTE ESTERASE UR QL STRIP: NEGATIVE
LYMPHOCYTES # BLD AUTO: 2.58 THOUSANDS/ΜL (ref 0.6–4.47)
LYMPHOCYTES NFR BLD AUTO: 51 % (ref 14–44)
MAGNESIUM SERPL-MCNC: 1.8 MG/DL (ref 1.6–2.6)
MCH RBC QN AUTO: 30.6 PG (ref 26.8–34.3)
MCHC RBC AUTO-ENTMCNC: 32.1 G/DL (ref 31.4–37.4)
MCV RBC AUTO: 95 FL (ref 82–98)
MONOCYTES # BLD AUTO: 0.27 THOUSAND/ΜL (ref 0.17–1.22)
MONOCYTES NFR BLD AUTO: 5 % (ref 4–12)
NEUTROPHILS # BLD AUTO: 2.04 THOUSANDS/ΜL (ref 1.85–7.62)
NEUTS SEG NFR BLD AUTO: 40 % (ref 43–75)
NITRITE UR QL STRIP: NEGATIVE
NRBC BLD AUTO-RTO: 0 /100 WBCS
PH UR STRIP.AUTO: 6 [PH] (ref 4.5–8)
PHOSPHATE SERPL-MCNC: 2.6 MG/DL (ref 2.7–4.5)
PHOSPHATE UR-MCNC: 16.2 MG/DL
PLATELET # BLD AUTO: 258 THOUSANDS/UL (ref 149–390)
PMV BLD AUTO: 10.6 FL (ref 8.9–12.7)
POTASSIUM SERPL-SCNC: 3.3 MMOL/L (ref 3.5–5.3)
POTASSIUM SERPL-SCNC: 4.8 MMOL/L (ref 3.5–5.3)
PROT SERPL-MCNC: 5.6 G/DL (ref 6.4–8.2)
PROT UR STRIP-MCNC: NEGATIVE MG/DL
RBC # BLD AUTO: 3.66 MILLION/UL (ref 3.81–5.12)
SODIUM SERPL-SCNC: 141 MMOL/L (ref 136–145)
SODIUM SERPL-SCNC: 142 MMOL/L (ref 136–145)
SP GR UR STRIP.AUTO: 1.02 (ref 1–1.03)
UROBILINOGEN UR QL STRIP.AUTO: 0.2 E.U./DL
WBC # BLD AUTO: 5.09 THOUSAND/UL (ref 4.31–10.16)

## 2018-11-28 PROCEDURE — 81003 URINALYSIS AUTO W/O SCOPE: CPT | Performed by: INTERNAL MEDICINE

## 2018-11-28 PROCEDURE — 80048 BASIC METABOLIC PNL TOTAL CA: CPT | Performed by: INTERNAL MEDICINE

## 2018-11-28 PROCEDURE — 84105 ASSAY OF URINE PHOSPHORUS: CPT | Performed by: INTERNAL MEDICINE

## 2018-11-28 PROCEDURE — 80053 COMPREHEN METABOLIC PANEL: CPT | Performed by: INTERNAL MEDICINE

## 2018-11-28 PROCEDURE — 83735 ASSAY OF MAGNESIUM: CPT | Performed by: INTERNAL MEDICINE

## 2018-11-28 PROCEDURE — 82570 ASSAY OF URINE CREATININE: CPT | Performed by: INTERNAL MEDICINE

## 2018-11-28 PROCEDURE — 85025 COMPLETE CBC W/AUTO DIFF WBC: CPT | Performed by: INTERNAL MEDICINE

## 2018-11-28 PROCEDURE — 84100 ASSAY OF PHOSPHORUS: CPT | Performed by: INTERNAL MEDICINE

## 2018-11-28 RX ORDER — SODIUM CHLORIDE 1000 MG
1 TABLET, SOLUBLE MISCELLANEOUS 2 TIMES DAILY WITH MEALS
Status: DISCONTINUED | OUTPATIENT
Start: 2018-11-28 | End: 2018-11-29

## 2018-11-28 RX ORDER — SODIUM BICARBONATE 650 MG/1
650 TABLET ORAL
Status: DISCONTINUED | OUTPATIENT
Start: 2018-11-28 | End: 2018-11-29 | Stop reason: HOSPADM

## 2018-11-28 RX ORDER — ERGOCALCIFEROL 1.25 MG/1
50000 CAPSULE ORAL WEEKLY
Status: DISCONTINUED | OUTPATIENT
Start: 2018-11-28 | End: 2018-11-29 | Stop reason: HOSPADM

## 2018-11-28 RX ORDER — POTASSIUM CHLORIDE 14.9 MG/ML
20 INJECTION INTRAVENOUS
Status: COMPLETED | OUTPATIENT
Start: 2018-11-28 | End: 2018-11-28

## 2018-11-28 RX ADMIN — Medication 100 MG: at 08:16

## 2018-11-28 RX ADMIN — MECLIZINE HYDROCHLORIDE 25 MG: 25 TABLET ORAL at 21:26

## 2018-11-28 RX ADMIN — MAGNESIUM OXIDE TAB 400 MG (241.3 MG ELEMENTAL MG) 400 MG: 400 (241.3 MG) TAB at 08:16

## 2018-11-28 RX ADMIN — DIBASIC SODIUM PHOSPHATE, MONOBASIC POTASSIUM PHOSPHATE AND MONOBASIC SODIUM PHOSPHATE 1 TABLET: 852; 155; 130 TABLET ORAL at 17:23

## 2018-11-28 RX ADMIN — DIBASIC SODIUM PHOSPHATE, MONOBASIC POTASSIUM PHOSPHATE AND MONOBASIC SODIUM PHOSPHATE 1 TABLET: 852; 155; 130 TABLET ORAL at 12:32

## 2018-11-28 RX ADMIN — FERROUS SULFATE TAB 325 MG (65 MG ELEMENTAL FE) 325 MG: 325 (65 FE) TAB at 17:23

## 2018-11-28 RX ADMIN — POTASSIUM CHLORIDE 20 MEQ: 200 INJECTION, SOLUTION INTRAVENOUS at 08:19

## 2018-11-28 RX ADMIN — POTASSIUM CHLORIDE 40 MEQ: 20 SOLUTION ORAL at 21:26

## 2018-11-28 RX ADMIN — POTASSIUM CHLORIDE AND SODIUM CHLORIDE 125 ML/HR: 900; 300 INJECTION, SOLUTION INTRAVENOUS at 20:34

## 2018-11-28 RX ADMIN — SODIUM BICARBONATE 650 MG: 650 TABLET ORAL at 17:24

## 2018-11-28 RX ADMIN — POTASSIUM CHLORIDE AND SODIUM CHLORIDE 125 ML/HR: 900; 300 INJECTION, SOLUTION INTRAVENOUS at 03:38

## 2018-11-28 RX ADMIN — POTASSIUM CHLORIDE 20 MEQ: 200 INJECTION, SOLUTION INTRAVENOUS at 12:31

## 2018-11-28 RX ADMIN — SODIUM BICARBONATE 650 MG: 650 TABLET ORAL at 12:32

## 2018-11-28 RX ADMIN — FERROUS SULFATE TAB 325 MG (65 MG ELEMENTAL FE) 325 MG: 325 (65 FE) TAB at 08:16

## 2018-11-28 RX ADMIN — Medication 1 CAPSULE: at 17:23

## 2018-11-28 RX ADMIN — POTASSIUM CHLORIDE 40 MEQ: 20 SOLUTION ORAL at 08:16

## 2018-11-28 RX ADMIN — ERGOCALCIFEROL 50000 UNITS: 1.25 CAPSULE ORAL at 12:31

## 2018-11-28 RX ADMIN — POTASSIUM CHLORIDE AND SODIUM CHLORIDE 125 ML/HR: 900; 300 INJECTION, SOLUTION INTRAVENOUS at 12:30

## 2018-11-28 RX ADMIN — POTASSIUM CHLORIDE 20 MEQ: 200 INJECTION, SOLUTION INTRAVENOUS at 10:23

## 2018-11-28 RX ADMIN — SODIUM CHLORIDE TAB 1 GM 1 G: 1 TAB at 17:23

## 2018-11-28 RX ADMIN — Medication 50 MG: at 08:16

## 2018-11-28 RX ADMIN — SODIUM CHLORIDE TAB 1 GM 1 G: 1 TAB at 12:32

## 2018-11-28 RX ADMIN — POTASSIUM CHLORIDE 40 MEQ: 20 SOLUTION ORAL at 17:24

## 2018-11-28 RX ADMIN — POTASSIUM CHLORIDE 40 MEQ: 20 SOLUTION ORAL at 12:33

## 2018-11-28 RX ADMIN — MAGNESIUM OXIDE TAB 400 MG (241.3 MG ELEMENTAL MG) 400 MG: 400 (241.3 MG) TAB at 17:23

## 2018-11-28 RX ADMIN — AMITRIPTYLINE HYDROCHLORIDE 50 MG: 50 TABLET, FILM COATED ORAL at 21:26

## 2018-11-28 NOTE — PHYSICIAN ADVISOR
Current patient class: Observation  The patient is currently on Hospital Day: 3 at [address]        The patient was admitted to the hospital  on N/A at N/A for the following diagnosis:  Hypokalemia [E87 6]     After review of the relevant documentation, labs, vital signs and test results, the patient is most appropriate for OBSERVATION STATUS  Rationale is as follows: The patient is a 28 yrs   Female who presented to the ED at 11/26/2018  4:53 PM with a chief complaint of recurrent hypokalemia with paresthesias noted by her nephrologist   She was admitted and received IV potassium repletion as well as IV fluids with potassium supplementation  Nephrology was consulted  Within 24 hours potassium levels had normalized to 3 6  Nephrology started salt tablets and increased magnesium to b i d  After review of her chart and considering intensity of service and severity of illness observation level of care is appropriate      The patients vitals on arrival were ED Triage Vitals   Temperature Pulse Respirations Blood Pressure SpO2   11/26/18 1704 11/26/18 1704 11/26/18 1704 11/26/18 1704 11/26/18 1704   98 °F (36 7 °C) 93 16 111/71 100 %      Temp Source Heart Rate Source Patient Position - Orthostatic VS BP Location FiO2 (%)   11/26/18 1704 11/27/18 0717 11/26/18 1704 11/26/18 1704 --   Temporal Monitor Sitting Left arm       Pain Score       11/27/18 0820       No Pain           Past Medical History:   Diagnosis Date    Hypokalemia     Migraine      Past Surgical History:   Procedure Laterality Date    ABDOMINAL SURGERY      APPENDECTOMY      CHOLECYSTECTOMY      GASTRIC BYPASS      HYSTERECTOMY             Consults have been placed to:   IP CONSULT TO NEPHROLOGY    Vitals:    11/27/18 0717 11/27/18 1520 11/27/18 2321 11/28/18 0717   BP: 106/61 96/55 99/54 99/50   BP Location: Right arm Right arm Right arm Right arm   Pulse: 70 80 78 81   Resp: 18 18 20 18   Temp: 97 8 °F (36 6 °C) 98 6 °F (37 °C) 97 7 °F (36 5 °C) 97 5 °F (36 4 °C)   TempSrc: Temporal Tympanic Temporal Temporal   SpO2: 100% 100% 98% 100%   Weight:       Height:           Most recent labs:    Recent Labs      11/26/18   1757  11/27/18   0601   11/28/18   0531   WBC  6 29  5 87   --   5 09   HGB  13 6  12 8   --   11 2*   HCT  40 0  38 9   --   34 9   PLT  317  265   --   258   K  2 4*  2 9*   < >  3 3*   CALCIUM  8 4  8 1*   < >  7 4*   BUN  17  14   < >  9   CREATININE  1 02  0 92   < >  0 69   TROPONINI  <0 02  <0 02   --    --    CKTOTAL  69   --    --    --    AST  19  17   --   14   ALT  31  25   --   21   ALKPHOS  45*  38*   --   34*    < > = values in this interval not displayed         Scheduled Meds:  Current Facility-Administered Medications:  acetaminophen 650 mg Oral Q6H PRN Ever Garner DO    amitriptyline 50 mg Oral HS Ever Garner DO    b complex-vitamin C-folic acid 1 capsule Oral Daily With Justin Martin, DO    enoxaparin 40 mg Subcutaneous Q24H Ever Garner DO    ergocalciferol 50,000 Units Oral Weekly John Quezada MD    ferrous sulfate 325 mg Oral BID With Meals Ever Garner DO    magnesium oxide 400 mg Oral BID Arcelia Givens MD    meclizine 25 mg Oral HS Papo Carr, DO    ondansetron 4 mg Intravenous Q6H PRN Ever Garner DO    potassium chloride 40 mEq Oral 4x Daily Brandt Jarrett, DO    potassium chloride 20 mEq Intravenous Todd Ray DO Last Rate: 20 mEq (11/28/18 1023)   potassium-sodium phosphateS 1 tablet Oral BID With Meals Arcelia Givens MD    sodium bicarbonate 650 mg Oral BID after meals Arcelia Givens MD    sodium chloride 0 9 % with KCl 40 mEq/L 125 mL/hr Intravenous Continuous Ever Garner DO Last Rate: 125 mL/hr (11/28/18 0338)   sodium chloride 1 g Oral BID With Meals Arcelia Givens MD    spironolactone 50 mg Oral Daily Ever Garner, DO    thiamine 100 mg Oral Daily Ever Garner, DO    zinc gluconate 50 mg Oral Daily [employer], DO      Continuous Infusions:  sodium chloride 0 9 % with KCl 40 mEq/L 125 mL/hr Last Rate: 125 mL/hr (11/28/18 0338)     PRN Meds:   acetaminophen    ondansetron    Surgical procedures (if appropriate):

## 2018-11-28 NOTE — PLAN OF CARE
Problem: PAIN - ADULT  Goal: Verbalizes/displays adequate comfort level or baseline comfort level  Interventions:  - Encourage patient to monitor pain and request assistance  - Assess pain using appropriate pain scale, 0-10 pain scale  - Administer analgesics based on type and severity of pain and evaluate response  - Implement non-pharmacological measures as appropriate and evaluate response  - Consider cultural and social influences on pain and pain management  - Notify physician/advanced practitioner if interventions unsuccessful or patient reports new pain   Outcome: Progressing      Problem: INFECTION - ADULT  Goal: Absence or prevention of progression during hospitalization  INTERVENTIONS:  - Assess and monitor for signs and symptoms of infection  - Monitor lab/diagnostic results  - Monitor all insertion sites, i e  indwelling lines, tubes, and drains  - [place] appropriate cooling/warming therapies per order  - Administer medications as ordered  - Instruct and encourage patient and family to use good hand hygiene technique     Outcome: Progressing    Goal: Absence of fever/infection during neutropenic period  INTERVENTIONS:  - Monitor WBC   Outcome: Completed Date Met: 11/28/18      Problem: SAFETY ADULT  Goal: Patient will remain free of falls  INTERVENTIONS:  - Assess patient frequently for physical needs  -  [place] fall precautions as indicated by assessment   Low fall risk   - Educate patient/family on patient safety including physical limitations  - Instruct patient to call for assistance with activity based on assessment  - Modify environment to reduce risk of injury  - Consider OT/PT consult to assist with strengthening/mobility   Outcome: Progressing    Goal: Maintain or return to baseline ADL function  INTERVENTIONS:  -  Assess patient's ability to carry out ADLs; assess patient's baseline for ADL function and identify physical deficits which impact ability to perform ADLs (Independent)  - Assess patient's mobility; Independent  - Encourage maximum independence but intervene and supervise when necessary  ¯ Assess for home care needs following discharge   ¯ Provide patient education as appropriate   Outcome: Progressing    Goal: Maintain or return mobility status to optimal level  INTERVENTIONS:  - Assess patient's baseline mobility status (Independent)    - [place] fall precautions as indicated by assessment  Low fall risk   - Record patient progress and toleration of activity level on Mobility SBAR; progress patient to next Phase/Stage  - Instruct patient to call for assistance with activity based on assessment  - Request Rehabilitation consult to assist with strengthening/weightbearing, etc    Outcome: Progressing      Problem: DISCHARGE PLANNING  Goal: Discharge to home or other facility with appropriate resources  INTERVENTIONS:  - Identify barriers to discharge w/patient and caregiver  - Arrange for needed discharge resources and transportation as appropriate  - Identify discharge learning needs (meds, wound care, etc )  - Refer to Case Management Department for coordinating discharge planning if the patient needs post-hospital services based on physician/advanced practitioner order or complex needs related to functional status, cognitive ability, or social support system   Outcome: Progressing      Problem: Knowledge Deficit  Goal: Patient/family/caregiver demonstrates understanding of disease process, treatment plan, medications, and discharge instructions  Complete learning assessment and assess knowledge base    Interventions:  - Provide teaching at level of understanding  - Provide teaching via preferred learning methods   Outcome: Progressing      Problem: DISCHARGE PLANNING - CARE MANAGEMENT  Goal: Discharge to post-acute care or home with appropriate resources  INTERVENTIONS:  - Conduct assessment to determine patient/family and health care team treatment goals, and need for post-acute services based on payer coverage, community resources, and patient preferences, and barriers to discharge  - Address psychosocial, clinical, and financial barriers to discharge as identified in assessment in conjunction with the patient/family and health care team  - Arrange appropriate level of post-acute services according to patient's   needs and preference and payer coverage in collaboration with the physician and health care team  - Communicate with and update the patient/family, physician, and health care team regarding progress on the discharge plan  - Arrange appropriate transportation to post-acute venues   Outcome: Progressing      Problem: METABOLIC, FLUID AND ELECTROLYTES - ADULT  Goal: Electrolytes maintained within normal limits  INTERVENTIONS:  - Monitor labs and assess patient for signs and symptoms of electrolyte imbalances  - Administer electrolyte replacement as ordered  - Monitor response to electrolyte replacements, including repeat lab results as appropriate  - Instruct patient on fluid and nutrition as appropriate  Outcome: Progressing

## 2018-11-28 NOTE — PROGRESS NOTES
Progress Note - Nephrology   Donato Burgess 28 y o  female MRN: [id number]  Unit/Bed#: 433-35 Encounter: [id number]    A/P:  1  Hypokalemia: she has kaliuresis in spite of spironolactone  Am reluctant to increase spironolactone as she is volume depleted with a low FeNa of 0 17%  Will start salt tablets and increase magnesium to bid  Agree with normal saline and IV KCl  2  Hypophosphatemia: will check urinary phosphorus  Although she has no aminoaciduria, she has renal leakage of potassium  Will start Na-K phosphate packets  3  Volume depletion: on IVF  Follow up reason for today's visit:  hypokalemia    Hypokalemia    Patient Active Problem List   Diagnosis    Hypokalemia    History of gastric bypass    Migraine without aura and without status migrainosus, not intractable    Left-sided weakness    Hypophosphatemia    Anemia    Vitamin D deficiency    Weakness of both lower extremities    Weakness of both upper extremities    Elevated CPK    Vitamin B12 deficiency    Cervical lymphadenopathy    Generalized weakness    Paresthesia of both lower extremities    Paresthesias    B12 deficiency    Gastric bypass status for obesity    GERD (gastroesophageal reflux disease)    Migraine    WAGNER (obstructive sleep apnea)    Other intestinal malabsorption         Subjective:   Denies headaches, dizziness, chest pain, dyspnea or abdominal pain  Voiding well    Objective:     Vitals: Blood pressure 99/50, pulse 81, temperature 97 5 °F (36 4 °C), temperature source Temporal, resp  rate 18, height 5' 3" (1 6 m), weight 69 1 kg (152 lb 4 8 oz), SpO2 100 %  ,Body mass index is 26 98 kg/m²      Weight (last 2 days)     Date/Time   Weight    11/26/18 1704  69 1 (152 3)                Intake/Output Summary (Last 24 hours) at 11/28/18 0920  Last data filed at 11/27/18 1806   Gross per 24 hour   Intake             1395 ml   Output                0 ml   Net             1395 ml     I/O last 3 completed shifts: In: 9358 [P O :720; I V :975]  Out: -          Physical Exam: BP 99/50 (BP Location: Right arm)   Pulse 81   Temp 97 5 °F (36 4 °C) (Temporal)   Resp 18   Ht 5' 3" (1 6 m)   Wt 69 1 kg (152 lb 4 8 oz)   SpO2 100%   BMI 26 98 kg/m²     General Appearance:    Alert, cooperative, no distress, appears stated age   Head:    Normocephalic, without obvious abnormality, atraumatic   Eyes:    Conjunctiva/corneas clear   Ears:    Normal external ears   Nose:   Nares normal, septum midline, mucosa normal, no drainage    or sinus tenderness   Throat:   Lips, mucosa, and tongue normal; teeth and gums normal   Neck:   Supple, symmetrical, trachea midline, no adenopathy;        thyroid:  No enlargement/tenderness/nodules; no carotid    bruit or JVD   Back:     Symmetric, no curvature, ROM normal, no CVA tenderness   Lungs:     Clear to auscultation bilaterally, respirations unlabored   Chest wall:    No tenderness or deformity   Heart:    Regular rate and rhythm, S1 and S2 normal, no murmur, rub   or gallop   Abdomen:     Soft, non-tender, bowel sounds active   Extremities:   Extremities normal, atraumatic, no cyanosis or edema   Skin:   Skin color, texture, turgor normal, no rashes or lesions   Lymph nodes:   Cervical normal   Neurologic:   CNII-XII intact            Lab, Imaging and other studies: I have personally reviewed pertinent labs  CBC: Lab Results   Component Value Date    WBC 5 09 11/28/2018    HGB 11 2 (L) 11/28/2018    HCT 34 9 11/28/2018    MCV 95 11/28/2018     11/28/2018    MCH 30 6 11/28/2018    MCHC 32 1 11/28/2018    RDW 15 8 (H) 11/28/2018    MPV 10 6 11/28/2018    NRBC 0 11/28/2018     CMP: Lab Results   Component Value Date    K 3 3 (L) 11/28/2018     (H) 11/28/2018    CO2 20 (L) 11/28/2018    BUN 9 11/28/2018    CREATININE 0 69 11/28/2018    CALCIUM 7 4 (L) 11/28/2018    AST 14 11/28/2018    ALT 21 11/28/2018    ALKPHOS 34 (L) 11/28/2018    EGFR 114 11/28/2018           Results from last 7 days  Lab Units 11/28/18  0531 11/27/18  1235 11/27/18  0601 11/26/18  1757   POTASSIUM mmol/L 3 3* 3 6 2 9* 2 4*   CHLORIDE mmol/L 113* 110* 108 107   CO2 mmol/L 20* 20* 20* 24   BUN mg/dL 9 13 14 17   CREATININE mg/dL 0 69 0 81 0 92 1 02   CALCIUM mg/dL 7 4* 7 8* 8 1* 8 4   ALK PHOS U/L 34*  --  38* 45*   ALT U/L 21  --  25 31   AST U/L 14  --  17 19         Phosphorus:   Lab Results   Component Value Date    PHOS 2 6 (L) 11/28/2018     Magnesium:   Lab Results   Component Value Date    MG 1 8 11/28/2018     Urinalysis: No results found for: COLORU, CLARITYU, SPECGRAV, PHUR, LEUKOCYTESUR, NITRITE, PROTEINUA, GLUCOSEU, KETONESU, BILIRUBINUR, BLOODU  Ionized Calcium: No results found for: CAION  Coagulation: No results found for: PT, INR, APTT  Troponin: No results found for: TROPONINI  ABG: No results found for: PHART, VRT2ICP, PO2ART, QBB5RQN, X0TJMUNZ, BEART, SOURCE  Radiology review:     IMAGING  No results found      Current Facility-Administered Medications   Medication Dose Route Frequency    acetaminophen (TYLENOL) tablet 650 mg  650 mg Oral Q6H PRN    amitriptyline (ELAVIL) tablet 50 mg  50 mg Oral HS    b complex-vitamin C-folic acid (NEPHROCAPS) capsule 1 capsule  1 capsule Oral Daily With Dinner    enoxaparin (LOVENOX) subcutaneous injection 40 mg  40 mg Subcutaneous Q24H    ferrous sulfate tablet 325 mg  325 mg Oral BID With Meals    magnesium oxide (MAG-OX) tablet 400 mg  400 mg Oral Daily    meclizine (ANTIVERT) tablet 25 mg  25 mg Oral HS    ondansetron (ZOFRAN) injection 4 mg  4 mg Intravenous Q6H PRN    potassium chloride 10 % oral solution 40 mEq  40 mEq Oral 4x Daily    potassium chloride 20 mEq IVPB (premix)  20 mEq Intravenous Q2H    sodium chloride 0 9 % with KCl 40 mEq/L infusion (premix)  125 mL/hr Intravenous Continuous    spironolactone (ALDACTONE) tablet 50 mg  50 mg Oral Daily    thiamine (VITAMIN B1) tablet 100 mg  100 mg Oral Daily    zinc gluconate tablet 50 mg  50 mg Oral Daily     There are no discontinued medications      Mariposa Ferrer MD

## 2018-11-28 NOTE — UTILIZATION REVIEW
Initial Clinical Review    Admission: Date/Time/Statement: 11/26/18 @ 1732 Observation Written     Orders Placed This Encounter   Procedures    Place in Observation     Standing Status:   Standing     Number of Occurrences:   1     Order Specific Question:   Admitting Physician     Answer:   Javan Mo     Order Specific Question:   Level of Care     Answer:   Med Surg [16]     Chief Complaint: Paresthesias of the face    History of Illness: Henry Muniz is a 28 y o  female who presents as a direct admission from Dr Adolph Lynn (Nephrology) for recurrent hypokalemia  The patient was first diagnosed with symptomatic hypokalemia in July of 2018  She has a history of gastric bypass in 2014  Since July of 2018, she was been on daily PO potassium chloride supplementation  She has required multiple admissions for symptomatic hypokalemia  Last week, the patient started to developed paresthesias of her bilateral hands and bilateral feet, which usually correlates with worsening hypokalemia for the patient  Over the last 6 days, the patient has been taking 40 meQ of potassium chloride 5 times per day (200 meQ daily total)  The patient had traveled to [place] for the holidays last week and had to come back early secondary to her severe paresthesias  Over the last few days, the patient developed facial paresthesias, which usually correlates to more severe hypokalemia for the patient  The patient denies any nausea, vomiting, or diarrhea  She also denies any laxative use  Nothing seemed to relieve her symptoms  No chest pain  No shortness of breath  No fevers or chills  No abdominal pain        Vital Signs:   Triage Vitals   Temperature Pulse Respirations Blood Pressure SpO2   11/26/18 1704 11/26/18 1704 11/26/18 1704 11/26/18 1704 11/26/18 1704   98 °F (36 7 °C) 93 16 111/71 100 %      Temp Source Heart Rate Source Patient Position - Orthostatic VS BP Location FiO2 (%)   11/26/18 1704 11/27/18 0717 11/26/18 1704 11/26/18 1704 --   Temporal Monitor Sitting Left arm       Pain Score       11/27/18 0820       No Pain        Wt Readings from Last 1 Encounters:   11/26/18 69 1 kg (152 lb 4 8 oz)       Vital Signs (abnormal): WNL    Abnormal Labs/Diagnostic Test Results:  POTASSIUM 3 2*     EKG (11/26/2018): Normal sinus rhythm with a heart rate of 77 bpm;  Flattened T waves V3-V6    Past Medical/Surgical History:    Active Ambulatory Problems     Diagnosis Date Noted    Hypokalemia 07/07/2018    History of gastric bypass 07/07/2018    Migraine without aura and without status migrainosus, not intractable 07/07/2018    Left-sided weakness 07/07/2018    Hypophosphatemia 07/09/2018    Anemia 07/10/2018    Vitamin D deficiency 07/10/2018    Weakness of both lower extremities 07/11/2018    Weakness of both upper extremities 07/11/2018    Elevated CPK 07/11/2018    Vitamin B12 deficiency 07/11/2018    Cervical lymphadenopathy 07/11/2018    Generalized weakness 07/12/2018    Paresthesia of both lower extremities 08/15/2018    Paresthesias 08/15/2018    B12 deficiency 01/17/2016    Gastric bypass status for obesity 10/02/2018    GERD (gastroesophageal reflux disease) 09/03/2018    Migraine 05/29/2014    WAGNER (obstructive sleep apnea) 06/11/2014    Other intestinal malabsorption 10/05/2018     Resolved Ambulatory Problems     Diagnosis Date Noted    Non-traumatic rhabdomyolysis 07/07/2018    Transaminitis 07/07/2018    Hypocalcemia 07/09/2018    Hypernatremia 07/09/2018     Past Medical History:   Diagnosis Date    Hypokalemia     Migraine        Admitting Diagnosis: Hypokalemia [E87 6]    Age/Sex: 28 y o  female    Assessment/Plan:   * Hypokalemia   Assessment & Plan     · Place in 23-hour observation, med/surg level of care with telemetry monitoring in the setting of hypokalemia  · The patient has been taking 5 doses of 40 meQ KCL supplementation daily for a total of 200 meQ potassium chloride oral supplementation daily for the last 3 days  · Replete with IV potassium chloride 20 meQ x 3 doses now, and initiate NSS IV fluids with 40 meQ KCl at 125 ml/hr  · Check labs now  · Check an EKG  · Consult Nephrology for hypokalemia management  · Possible outpatient referral for the patient to a higher level of care/specialty center to see a hypokalemia expert  · Follow the potassium level and magnesium level closely      Paresthesias   Assessment & Plan     · Secondary to severe hypokalemia  · Paresthesias symptoms correlate with her hypokalemia      WAGNER (obstructive sleep apnea)   Assessment & Plan     · Check an overnight sleep screen  · Outpatient sleep study and follow-up with her PCP in regards to this matter      Vitamin D deficiency   Assessment & Plan     · Continue ergocalciferol replacement therapy  · Outpatient follow-up with her PCP in regards to this matter      History of gastric bypass   Assessment & Plan     · History of gastric bypass in 2014  · Outpatient follow-up with Bariatric Surgery       VTE Prophylaxis: Enoxaparin (Lovenox)  / reason for no mechanical VTE prophylaxis Low VTE risk   Anticipated Length of Stay:  Patient will be admitted on an Observation basis with an anticipated length of stay of less than 2 midnights  Justification for Hospital Stay:  The patient requires IV potassium chloride supplementation, telemetry monitoring for the hypokalemia, and requires serial laboratory testing to monitor her potassium level      Admission Orders:  Regular diet  OOB with assist  Consult nephrology    Scheduled Meds:   Current Facility-Administered Medications:  acetaminophen 650 mg Oral Q6H PRN   amitriptyline 50 mg Oral HS   b complex-vitamin C-folic acid 1 capsule Oral Daily With Dinner   enoxaparin 40 mg Subcutaneous Q24H   ergocalciferol 50,000 Units Oral Weekly   ferrous sulfate 325 mg Oral BID With Meals   magnesium oxide 400 mg Oral BID   meclizine 25 mg Oral HS ondansetron 4 mg Intravenous Q6H PRN   potassium chloride 40 mEq Oral 4x Daily   potassium chloride 20 mEq Intravenous Q2H   potassium-sodium phosphateS 1 tablet Oral BID With Meals   sodium bicarbonate 650 mg Oral BID after meals   sodium chloride 0 9 % with KCl 40 mEq/L 125 mL/hr Intravenous Continuous   sodium chloride 1 g Oral BID With Meals   spironolactone 50 mg Oral Daily   thiamine 100 mg Oral Daily   zinc gluconate 50 mg Oral Daily     Continuous Infusions:   sodium chloride 0 9 % with KCl 40 mEq/L 125 mL/hr Last Rate: 125 mL/hr (11/28/18 0338)     PRN Meds:   acetaminophen    ondansetron

## 2018-11-28 NOTE — PROGRESS NOTES
Progress Note - La Jones 1985, 28 y o  female MRN: [id number]    Unit/Bed#: 553-69 Encounter: [id number]    Primary Care Provider: ARIADNA Pandey   Date and time admitted to hospital: 11/26/2018  4:53 PM        * Hypokalemia   Assessment & Plan    · Place in 23-hour observation, med/surg level of care with telemetry monitoring in the setting of hypokalemia  · The patient has been taking 5 doses of 40 meQ KCL supplementation daily for a total of 200 meQ potassium chloride oral supplementation daily for the last 3 days  · Replete with IV potassium chloride 20 meQ x 3 doses now, and initiate NSS IV fluids with 40 meQ KCl at 125 ml/hr  · Check labs now  · Check an EKG  · Consult Nephrology  · Possible outpatient referral for the patient to a higher level of care/specialty center to see a hypokalemia expert  · Follow the potassium level and magnesium level closely    11/27/18:  - K+ today is 2 9, increased slightly from 2 4 yesterday   - Patient still complains of perioral numbness and bilateral paresthesia up to knee  - Today will repeat IV potassium chloride 20 meQ x 3 doses and repeat BMP at 4pm today to follow up K+ level  - Continue with NS 0 9 IVF with 40 mEq KCl @ 125 cc/hr  - No EKG changes on 11/26  - Consider obtaining urine K+ to Cr ratio to determine hypokalemia due to extrarenal vs  Renal K+ loss    11/28/18:   - K+ today is 3 3, compared to yesterday of 3 6 and 2 9  - Nephrology consult appreciated  Recommended salt tablet 1g PO BID to help reabsorb K+   Hypokalemia most likely due to malabsorption from hx of gastric bypass in 2014   - Continue with NS 0 9 IVF with 40 mEq KCl @ 125 cc/hr  - KCl 40mEq PO QID + KCl 20 mEq IVPB Q2H  - Magnesium tab 400mg PO BID           Paresthesias   Assessment & Plan    · Secondary to severe hypokalemia  · Paresthesias symptoms correlate with her hypokalemia  · Patient still has paresthesias in bilateral LLE today (11/28) but perioral numbness resolved WAGNER (obstructive sleep apnea)   Assessment & Plan    · Check an overnight sleep screen  · Outpatient sleep study and follow-up with her PCP in regards to this matter     Vitamin D deficiency   Assessment & Plan    · Ergocalciferol replacement therapy, ordered 50,000 unit today 11/28 (replace weekly)  · Outpatient follow-up with her PCP in regards to this matter     History of gastric bypass   Assessment & Plan    · History of gastric bypass in 2014  · Outpatient follow-up with Bariatric Surgery       Hypophosphatemia   Assessment & Plan    - Phosphorus of 2 6 today  - Can be due to vitamin D deficiency (Vitamin D 25 Hydroxy of 14 8 on 11/27)  - Ordered ergocalciferol 50,000 unit today (replace weekly)  - Na-K phosphate packets              Subjective:   Patient is seen and examined at bedside  Patient's K+ level improved to 3 3 today  She stated that her perioral paresthesia resolved but still have bilateral lower extremities numbness and tingling sensation  Otherwise no other complaints  Denied cramps, chest pain, N/V, weakness, constipation  Reported bowel movement and no problem with urination  Vital signs stable  Objective:     Vitals: Blood pressure 99/50, pulse 81, temperature 97 5 °F (36 4 °C), temperature source Temporal, resp  rate 18, height 5' 3" (1 6 m), weight 69 1 kg (152 lb 4 8 oz), SpO2 100 %  ,Body mass index is 26 98 kg/m²  Wt Readings from Last 3 Encounters:   11/26/18 69 1 kg (152 lb 4 8 oz)   11/25/18 72 1 kg (158 lb 15 2 oz)   11/06/18 72 1 kg (158 lb 15 2 oz)       Intake/Output Summary (Last 24 hours) at 11/28/18 0943  Last data filed at 11/27/18 1806   Gross per 24 hour   Intake             1395 ml   Output                0 ml   Net             1395 ml       Physical Exam:     Physical Exam   Constitutional: She is oriented to person, place, and time  She appears well-nourished  No distress     HENT:   Mouth/Throat: Oropharynx is clear and moist    Eyes: Pupils are equal, round, and reactive to light  Right eye exhibits no discharge  Left eye exhibits no discharge  No scleral icterus  Neck: Normal range of motion  Cardiovascular: Normal rate, regular rhythm and normal heart sounds  No murmur heard  Pulmonary/Chest: Effort normal and breath sounds normal  No respiratory distress  She has no wheezes  She exhibits no tenderness  Abdominal: Soft  Bowel sounds are normal  She exhibits no distension  There is no tenderness  There is no guarding  Musculoskeletal: She exhibits no edema or tenderness  Tingling and numbness in bilateral lower extremities up to knee  Neurological: She is alert and oriented to person, place, and time  Skin: Skin is warm and dry  Psychiatric: She has a normal mood and affect   Her behavior is normal  Judgment and thought content normal        Recent Results (from the past 24 hour(s))   Vitamin D 25 hydroxy    Collection Time: 11/27/18 12:35 PM   Result Value Ref Range    Vit D, 25-Hydroxy 14 8 (L) 30 0 - 100 0 ng/mL   Basic metabolic panel    Collection Time: 11/27/18 12:35 PM   Result Value Ref Range    Sodium 141 136 - 145 mmol/L    Potassium 3 6 3 5 - 5 3 mmol/L    Chloride 110 (H) 100 - 108 mmol/L    CO2 20 (L) 21 - 32 mmol/L    ANION GAP 11 4 - 13 mmol/L    BUN 13 5 - 25 mg/dL    Creatinine 0 81 0 60 - 1 30 mg/dL    Glucose 100 65 - 140 mg/dL    Calcium 7 8 (L) 8 3 - 10 1 mg/dL    eGFR 95 ml/min/1 73sq m   Creatinine, urine, random    Collection Time: 11/27/18  2:02 PM   Result Value Ref Range    Creatinine, Ur 150 0 mg/dL   Sodium, urine, random    Collection Time: 11/27/18  2:02 PM   Result Value Ref Range    Sodium, Ur 44    Urea nitrogen, urine    Collection Time: 11/27/18  2:02 PM   Result Value Ref Range    Urea Nitrogen, Ur 882 mg/dL   Potassium, urine, random    Collection Time: 11/27/18  2:02 PM   Result Value Ref Range    Potassium Urine Timed 18 0 1 0 - 300 0 mmol/L   CBC and differential    Collection Time: 11/28/18  5:31 AM   Result Value Ref Range    WBC 5 09 4 31 - 10 16 Thousand/uL    RBC 3 66 (L) 3 81 - 5 12 Million/uL    Hemoglobin 11 2 (L) 11 5 - 15 4 g/dL    Hematocrit 34 9 34 8 - 46 1 %    MCV 95 82 - 98 fL    MCH 30 6 26 8 - 34 3 pg    MCHC 32 1 31 4 - 37 4 g/dL    RDW 15 8 (H) 11 6 - 15 1 %    MPV 10 6 8 9 - 12 7 fL    Platelets 813 594 - 364 Thousands/uL    nRBC 0 /100 WBCs    Neutrophils Relative 40 (L) 43 - 75 %    Immat GRANS % 0 0 - 2 %    Lymphocytes Relative 51 (H) 14 - 44 %    Monocytes Relative 5 4 - 12 %    Eosinophils Relative 3 0 - 6 %    Basophils Relative 1 0 - 1 %    Neutrophils Absolute 2 04 1 85 - 7 62 Thousands/µL    Immature Grans Absolute 0 01 0 00 - 0 20 Thousand/uL    Lymphocytes Absolute 2 58 0 60 - 4 47 Thousands/µL    Monocytes Absolute 0 27 0 17 - 1 22 Thousand/µL    Eosinophils Absolute 0 15 0 00 - 0 61 Thousand/µL    Basophils Absolute 0 04 0 00 - 0 10 Thousands/µL   Comprehensive metabolic panel    Collection Time: 11/28/18  5:31 AM   Result Value Ref Range    Sodium 142 136 - 145 mmol/L    Potassium 3 3 (L) 3 5 - 5 3 mmol/L    Chloride 113 (H) 100 - 108 mmol/L    CO2 20 (L) 21 - 32 mmol/L    ANION GAP 9 4 - 13 mmol/L    BUN 9 5 - 25 mg/dL    Creatinine 0 69 0 60 - 1 30 mg/dL    Glucose 79 65 - 140 mg/dL    Calcium 7 4 (L) 8 3 - 10 1 mg/dL    AST 14 5 - 45 U/L    ALT 21 12 - 78 U/L    Alkaline Phosphatase 34 (L) 46 - 116 U/L    Total Protein 5 6 (L) 6 4 - 8 2 g/dL    Albumin 2 7 (L) 3 5 - 5 0 g/dL    Total Bilirubin 0 20 0 20 - 1 00 mg/dL    eGFR 114 ml/min/1 73sq m   Magnesium    Collection Time: 11/28/18  5:31 AM   Result Value Ref Range    Magnesium 1 8 1 6 - 2 6 mg/dL   Phosphorus    Collection Time: 11/28/18  5:31 AM   Result Value Ref Range    Phosphorus 2 6 (L) 2 7 - 4 5 mg/dL       Current Facility-Administered Medications   Medication Dose Route Frequency Provider Last Rate Last Dose    acetaminophen (TYLENOL) tablet 650 mg  650 mg Oral Q6H PRN Papo Carr DO   650 mg at 11/27/18 2969    amitriptyline (ELAVIL) tablet 50 mg  50 mg Oral HS [facility], DO   50 mg at 11/27/18 2145    b complex-vitamin C-folic acid (NEPHROCAPS) capsule 1 capsule  1 capsule Oral Daily With Justin Martin, DO   1 capsule at 11/27/18 1615    enoxaparin (LOVENOX) subcutaneous injection 40 mg  40 mg Subcutaneous Q24H [facility], DO        ergocalciferol (VITAMIN D2) capsule 50,000 Units  50,000 Units Oral Weekly John Segundo MD        ferrous sulfate tablet 325 mg  325 mg Oral BID With Meals [facility], DO   325 mg at 11/28/18 0816    magnesium oxide (MAG-OX) tablet 400 mg  400 mg Oral BID Adam Damon MD        meclizine (ANTIVERT) tablet 25 mg  25 mg Oral HS [facility], DO   25 mg at 11/27/18 2145    ondansetron (ZOFRAN) injection 4 mg  4 mg Intravenous Q6H PRN [facility], DO        potassium chloride 10 % oral solution 40 mEq  40 mEq Oral 4x Daily Doe Dus, DO   40 mEq at 11/28/18 0816    potassium chloride 20 mEq IVPB (premix)  20 mEq Intravenous Tejeda Laly, DO 50 mL/hr at 11/28/18 0819 20 mEq at 11/28/18 0819    potassium-sodium phosphateS (K-PHOS,PHOSPHA 250) -250 mg tablet 1 tablet  1 tablet Oral BID With Meals Adam Damon MD        sodium bicarbonate tablet 650 mg  650 mg Oral BID after meals Adam Damon MD        sodium chloride 0 9 % with KCl 40 mEq/L infusion (premix)  125 mL/hr Intravenous Continuous [facility],  mL/hr at 11/28/18 0338 125 mL/hr at 11/28/18 3395    sodium chloride tablet 1 g  1 g Oral BID With Meals Adam Damon MD        spironolactone (ALDACTONE) tablet 50 mg  50 mg Oral Daily [facility], DO        thiamine (VITAMIN B1) tablet 100 mg  100 mg Oral Daily [facility], DO   100 mg at 11/28/18 0816    zinc gluconate tablet 50 mg  50 mg Oral Daily [facility], DO   50 mg at 11/28/18 0816       Invasive Devices     Peripheral Intravenous Line            Peripheral IV 11/25/18 Right Arm 2 days    Peripheral IV 11/26/18 Right;Dorsal (posterior) Forearm 1 day                Lab, Imaging and other studies: I have personally reviewed pertinent reports      VTE Pharmacologic Prophylaxis: Enoxaparin 40mg SC daily  VTE Mechanical Prophylaxis: sequential compression device    John Mora MD

## 2018-11-29 VITALS
HEIGHT: 63 IN | SYSTOLIC BLOOD PRESSURE: 108 MMHG | WEIGHT: 152.3 LBS | RESPIRATION RATE: 16 BRPM | HEART RATE: 75 BPM | TEMPERATURE: 98.6 F | BODY MASS INDEX: 26.98 KG/M2 | DIASTOLIC BLOOD PRESSURE: 52 MMHG | OXYGEN SATURATION: 99 %

## 2018-11-29 LAB
ANION GAP SERPL CALCULATED.3IONS-SCNC: 10 MMOL/L (ref 4–13)
BUN SERPL-MCNC: 5 MG/DL (ref 5–25)
CALCIUM SERPL-MCNC: 7.5 MG/DL (ref 8.3–10.1)
CHLORIDE SERPL-SCNC: 112 MMOL/L (ref 100–108)
CO2 SERPL-SCNC: 18 MMOL/L (ref 21–32)
CREAT SERPL-MCNC: 0.63 MG/DL (ref 0.6–1.3)
GFR SERPL CREATININE-BSD FRML MDRD: 117 ML/MIN/1.73SQ M
GLUCOSE SERPL-MCNC: 78 MG/DL (ref 65–140)
MAGNESIUM SERPL-MCNC: 1.9 MG/DL (ref 1.6–2.6)
PHOSPHATE SERPL-MCNC: 2.5 MG/DL (ref 2.7–4.5)
POTASSIUM SERPL-SCNC: 4 MMOL/L (ref 3.5–5.3)
SODIUM SERPL-SCNC: 140 MMOL/L (ref 136–145)

## 2018-11-29 PROCEDURE — 80048 BASIC METABOLIC PNL TOTAL CA: CPT | Performed by: INTERNAL MEDICINE

## 2018-11-29 PROCEDURE — 99217 PR OBSERVATION CARE DISCHARGE MANAGEMENT: CPT | Performed by: INTERNAL MEDICINE

## 2018-11-29 PROCEDURE — 83735 ASSAY OF MAGNESIUM: CPT | Performed by: INTERNAL MEDICINE

## 2018-11-29 PROCEDURE — 84100 ASSAY OF PHOSPHORUS: CPT | Performed by: INTERNAL MEDICINE

## 2018-11-29 RX ORDER — SODIUM BICARBONATE 650 MG/1
650 TABLET ORAL 2 TIMES DAILY
Qty: 60 TABLET | Refills: 0 | Status: SHIPPED | OUTPATIENT
Start: 2018-11-29 | End: 2019-05-13 | Stop reason: ALTCHOICE

## 2018-11-29 RX ADMIN — SODIUM CHLORIDE TAB 1 GM 1 G: 1 TAB at 08:06

## 2018-11-29 RX ADMIN — FERROUS SULFATE TAB 325 MG (65 MG ELEMENTAL FE) 325 MG: 325 (65 FE) TAB at 08:05

## 2018-11-29 RX ADMIN — MAGNESIUM OXIDE TAB 400 MG (241.3 MG ELEMENTAL MG) 400 MG: 400 (241.3 MG) TAB at 08:06

## 2018-11-29 RX ADMIN — DIBASIC SODIUM PHOSPHATE, MONOBASIC POTASSIUM PHOSPHATE AND MONOBASIC SODIUM PHOSPHATE 1 TABLET: 852; 155; 130 TABLET ORAL at 08:06

## 2018-11-29 RX ADMIN — POTASSIUM CHLORIDE 40 MEQ: 20 SOLUTION ORAL at 09:58

## 2018-11-29 RX ADMIN — Medication 50 MG: at 08:07

## 2018-11-29 RX ADMIN — SODIUM BICARBONATE 650 MG: 650 TABLET ORAL at 08:06

## 2018-11-29 RX ADMIN — Medication 100 MG: at 08:06

## 2018-11-29 RX ADMIN — POTASSIUM CHLORIDE AND SODIUM CHLORIDE 125 ML/HR: 900; 300 INJECTION, SOLUTION INTRAVENOUS at 04:34

## 2018-11-29 NOTE — PLAN OF CARE
Problem: DISCHARGE PLANNING - CARE MANAGEMENT  Goal: Discharge to post-acute care or home with appropriate resources  INTERVENTIONS:  - Conduct assessment to determine patient/family and health care team treatment goals, and need for post-acute services based on payer coverage, community resources, and patient preferences, and barriers to discharge  - Address psychosocial, clinical, and financial barriers to discharge as identified in assessment in conjunction with the patient/family and health care team  - Arrange appropriate level of post-acute services according to patient's   needs and preference and payer coverage in collaboration with the physician and health care team  - Communicate with and update the patient/family, physician, and health care team regarding progress on the discharge plan  - Arrange appropriate transportation to post-acute venues   Outcome: Completed Date Met: 11/29/18  -ME home with outpatient follow up

## 2018-11-29 NOTE — PLAN OF CARE
Problem: PAIN - ADULT  Goal: Verbalizes/displays adequate comfort level or baseline comfort level  Interventions:  - Encourage patient to monitor pain and request assistance  - Assess pain using appropriate pain scale, 0-10 pain scale  - Administer analgesics based on type and severity of pain and evaluate response  - Implement non-pharmacological measures as appropriate and evaluate response  - Consider cultural and social influences on pain and pain management  - Notify physician/advanced practitioner if interventions unsuccessful or patient reports new pain    Outcome: Progressing      Problem: INFECTION - ADULT  Goal: Absence or prevention of progression during hospitalization  INTERVENTIONS:  - Assess and monitor for signs and symptoms of infection  - Monitor lab/diagnostic results  - Monitor all insertion sites, i e  indwelling lines, tubes, and drains  - [place] appropriate cooling/warming therapies per order  - Administer medications as ordered  - Instruct and encourage patient and family to use good hand hygiene technique      Outcome: Progressing      Problem: SAFETY ADULT  Goal: Patient will remain free of falls  INTERVENTIONS:  - Assess patient frequently for physical needs  -  [place] fall precautions as indicated by assessment  Medium fall risk fall risk   - Educate patient/family on patient safety including physical limitations  - Instruct patient to call for assistance with activity based on assessment  - Modify environment to reduce risk of injury  - Consider OT/PT consult to assist with strengthening/mobility    INTERVENTIONS:  - Assess patient frequently for physical needs  -  Identify cognitive and physical deficits and behaviors that affect risk of falls    -  [place] fall precautions as indicated by assessment   - Educate patient/family on patient safety including physical limitations  - Instruct patient to call for assistance with activity based on assessment  - Modify environment to reduce risk of injury  - Consider OT/PT consult to assist with strengthening/mobility    Outcome: Progressing    Goal: Maintain or return to baseline ADL function  INTERVENTIONS:  -  Assess patient's ability to carry out ADLs; assess patient's baseline for ADL function and identify physical deficits which impact ability to perform ADLs (Independent)  - Assess patient's mobility; Independent  - Encourage maximum independence but intervene and supervise when necessary  ¯ Assess for home care needs following discharge   ¯ Provide patient education as appropriate    Outcome: Progressing    Goal: Maintain or return mobility status to optimal level  INTERVENTIONS:  - Assess patient's baseline mobility status (Independent)    - [place] fall precautions as indicated by assessment  Medium fall risk   - Record patient progress and toleration of activity level on Mobility SBAR; progress patient to next Phase/Stage  - Instruct patient to call for assistance with activity based on assessment  - Request Rehabilitation consult to assist with strengthening/weightbearing, etc     Outcome: Progressing      Problem: DISCHARGE PLANNING  Goal: Discharge to home or other facility with appropriate resources  INTERVENTIONS:  - Identify barriers to discharge w/patient and caregiver  - Arrange for needed discharge resources and transportation as appropriate  - Identify discharge learning needs (meds, wound care, etc )  - Refer to Case Management Department for coordinating discharge planning if the patient needs post-hospital services based on physician/advanced practitioner order or complex needs related to functional status, cognitive ability, or social support system    Outcome: Progressing      Problem: Knowledge Deficit  Goal: Patient/family/caregiver demonstrates understanding of disease process, treatment plan, medications, and discharge instructions  Complete learning assessment and assess knowledge base    Interventions:  - Provide teaching at level of understanding  - Provide teaching via preferred learning methods (written, verbal, demonstration)  Outcome: Progressing      Problem: DISCHARGE PLANNING - CARE MANAGEMENT  Goal: Discharge to post-acute care or home with appropriate resources  INTERVENTIONS:  - Conduct assessment to determine patient/family and health care team treatment goals, and need for post-acute services based on payer coverage, community resources, and patient preferences, and barriers to discharge  - Address psychosocial, clinical, and financial barriers to discharge as identified in assessment in conjunction with the patient/family and health care team  - Arrange appropriate level of post-acute services according to patient's   needs and preference and payer coverage in collaboration with the physician and health care team  - Communicate with and update the patient/family, physician, and health care team regarding progress on the discharge plan  - Arrange appropriate transportation to post-acute venues   Outcome: Progressing      Problem: METABOLIC, FLUID AND ELECTROLYTES - ADULT  Goal: Electrolytes maintained within normal limits  INTERVENTIONS:  - Monitor labs and assess patient for signs and symptoms of electrolyte imbalances  - Administer electrolyte replacement as ordered  - Monitor response to electrolyte replacements, including repeat lab results as appropriate  - Instruct patient on fluid and nutrition as appropriate   Outcome: Progressing      Problem: Potential for Falls  Goal: Patient will remain free of falls  INTERVENTIONS:  - Assess patient frequently for physical needs  -  [place] fall precautions as indicated by assessment   Medium fall risk fall risk   - Educate patient/family on patient safety including physical limitations  - Instruct patient to call for assistance with activity based on assessment  - Modify environment to reduce risk of injury  - Consider OT/PT consult to assist with strengthening/mobility    INTERVENTIONS:  - Assess patient frequently for physical needs  -  Identify cognitive and physical deficits and behaviors that affect risk of falls    -  [place] fall precautions as indicated by assessment   - Educate patient/family on patient safety including physical limitations  - Instruct patient to call for assistance with activity based on assessment  - Modify environment to reduce risk of injury  - Consider OT/PT consult to assist with strengthening/mobility    Outcome: Progressing

## 2018-11-29 NOTE — SOCIAL WORK
Pt is a HRR but unable to be referred to OP care coordination dept as she doesn't see a [facility] provider

## 2018-11-29 NOTE — SOCIAL WORK
Discussed with patient preferences on discharge;understanding how to manage health at home; purpose of taking medications; importance of follow up care/appointments; and symptoms to watch out for once discharged home  Cm still awaiting determination from insurance company re: single case agreement for pt to be able to come to our outpatient infusion if she needs IV potassium infusion  Cm called pt's PCP office [facility] [phone] re: pt needing a follow up appointment  Office will be calling pt with an appointment  They are aware pt was dc'd home today

## 2018-11-29 NOTE — SOCIAL WORK
Placed a call to [facility]KA[facility] prior authorization dept  To follow up on status for single case agreement for pt to be able to come to our outpatient infusion in the event she needs Iv Potassium based on weekly lab draws  Case is still pending and needs to be reviewed by the nurse and can take several days for a determination to be rendered  [facility] when [facility] is made  Reference number is [id number]  Phone Number to call is [phone]

## 2018-11-29 NOTE — SOCIAL WORK
Left a message for [place] in the OP sleep study department re: pt being ordered for an outpatient sleep study  Requested Alia follow up with pt to schedule

## 2018-11-29 NOTE — NURSING NOTE
Instructions reviewed with patient, who verbalized understanding  Patient offered no complaints at time of d/c

## 2018-11-29 NOTE — DISCHARGE SUMMARY
Discharge Summary - Medical Toy Loud 28 y o  female MRN: [id number]    [facility] [address] [facility] Room / Bed: tanja [facility] [id number] Encounter: [id number]    BRIEF OVERVIEW  Admitting Provider: Falguni Boyer DO  Discharge Provider: Merna Flores DO    Discharge To: Home  Facility: none    Outpatient Follow-Up: PCP  Things to address at first follow up visit: Hypokalemia, follow up potassium level  Labs and results pending at discharge: BMP    Admission Date: 11/26/2018     Discharge Date: No discharge date for patient encounter  Primary Discharge Diagnosis  Principal Problem:    Hypokalemia  Active Problems:    Paresthesias    WAGNER (obstructive sleep apnea)    Vitamin D deficiency    History of gastric bypass    Hypophosphatemia  Resolved Problems:    * No resolved hospital problems  *        Consulting Providers   Nephrology Dr Abdullahi Reynaga MD      [facility]    Procedures Performed/Pertinent Test results    Results for Krunal Blue (MRN [id number]) as of 11/29/2018 10:40   Ref   Range 11/25/2018 15:15 11/26/2018 17:57 11/27/2018 06:01 11/27/2018 12:35 11/28/2018 05:31 11/28/2018 14:56 11/29/2018 05:52   Sodium Latest Ref Range: 136 - 145 mmol/L 138 144 141 141 142 141 140   Potassium Latest Ref Range: 3 5 - 5 3 mmol/L 3 2 (L) 2 4 (LL) 2 9 (L) 3 6 3 3 (L) 4 8 4 0   Chloride Latest Ref Range: 100 - 108 mmol/L 105 107 108 110 (H) 113 (H) 114 (H) 112 (H)   CO2 Latest Ref Range: 21 - 32 mmol/L 23 24 20 (L) 20 (L) 20 (L) 18 (L) 18 (L)   Anion Gap Latest Ref Range: 4 - 13 mmol/L 10 13 13 11 9 9 10   BUN Latest Ref Range: 5 - 25 mg/dL 15 17 14 13 9 7 5   Creatinine Latest Ref Range: 0 60 - 1 30 mg/dL 0 93 1 02 0 92 0 81 0 69 0 83 0 63   Glucose, Random Latest Ref Range: 65 - 140 mg/dL 85 78 80 100 79 79 78   Calcium Latest Ref Range: 8 3 - 10 1 mg/dL 8 3 8 4 8 1 (L) 7 8 (L) 7 4 (L) 7 5 (L) 7 5 (L)   AST Latest Ref Range: 5 - 45 U/L  19 17  14     ALT Latest Ref Range: 12 - 78 U/L  31 25  21     Alkaline Phosphatase Latest Ref Range: 46 - 116 U/L  45 (L) 38 (L)  34 (L)     Total Protein Latest Ref Range: 6 4 - 8 2 g/dL  7 4 6 4  5 6 (L)     Albumin Latest Ref Range: 3 5 - 5 0 g/dL  3 7 3 1 (L)  2 7 (L)     TOTAL BILIRUBIN Latest Ref Range: 0 20 - 1 00 mg/dL  0 20 0 30  0 20     eGFR Latest Units: ml/min/1 73sq m 80 72 81 95 114 92 117   Phosphorus Latest Ref Range: 2 7 - 4 5 mg/dL  3 8 3 0  2 6 (L)  2 5 (L)   Magnesium Latest Ref Range: 1 6 - 2 6 mg/dL  2 5 2 3  1 8  1 9       HPI    Divya Peterson is a 28 y o  female who presents as a direct admission from Dr Tian Cobos (Nephrology) for recurrent hypokalemia  The patient was first diagnosed with symptomatic hypokalemia in July of 2018  She has a history of gastric bypass in 2014  Since July of 2018, she was been on daily PO potassium chloride supplementation  She has required multiple admissions for symptomatic hypokalemia  Last week, the patient started to developed paresthesias of her bilateral hands and bilateral feet, which usually correlates with worsening hypokalemia for the patient  Over the last 6 days, the patient has been taking 40 meQ of potassium chloride 5 times per day (200 meQ daily total)  The patient had traveled to [place] for the holidays last week and had to come back early secondary to her severe paresthesias  Over the last few days, the patient developed facial paresthesias, which usually correlates to more severe hypokalemia for the patient  The patient denies any nausea, vomiting, or diarrhea  She also denies any laxative use  Nothing seemed to relieve her symptoms  No chest pain  No shortness of breath  No fevers or chills  No abdominal pain  Hospital Course    Patient was admitted on 11/26/18 due to severe hypokalemia with potassium level of 2 4  Patient developed paresthesias in perioral region and bilateral lower extremities up to her knee   She was placed in observation unit, med/surg level of care with telemetry monitoring  On the first day of hospitalization, patient received 3 doses of IV potassium chloride 20 mEq and NSS IV fluids with 40 mEq KCl  Next day patient's potassium level trended up to 2 9 and she repeated another regimen of IV potassium chloride with NS infusion  EKG was done on 11/26/18 revealed no EKG changes  Nephrology was consulted to investigate the cause of hypokalemia  Patient had a workup for renal losses of potassium but cannot find the reason for kaliuresis  Patient had a gastric bypass surgery in 2014 can be possibly due to malabsorption  Patient was added on sodium tablet and magnesium supplementation  Patient's potassium level improves to 4 0 on the day of discharge  Her perioral and bilateral lower extremities paresthesia resolved with mild numbness on left hand  She remains hemodynamically stable and no acute distress  Patient will discharge on sodium bicarbonate tablet 650mg BID, continue sodium phosphate tablets, recommend  gram of protein per day, and continue her current oral potassium supplementation  She is scheduled to have follow up lab ([facility]) next Monday and follow up with her PCP within one week of discharge  Physical Exam at Discharge    Physical Exam   Constitutional: She is oriented to person, place, and time  She appears well-developed  No distress  HENT:   Mouth/Throat: Oropharynx is clear and moist    Eyes: EOM are normal  Right eye exhibits no discharge  Left eye exhibits no discharge  No scleral icterus  Neck: Normal range of motion  Cardiovascular: Normal rate, regular rhythm, normal heart sounds and intact distal pulses  No murmur heard  Pulmonary/Chest: Effort normal and breath sounds normal  No respiratory distress  She has no wheezes  She exhibits no tenderness  Abdominal: Soft  Bowel sounds are normal  She exhibits no distension  There is no tenderness  Musculoskeletal: Normal range of motion   She exhibits no edema or tenderness  Neurological: She is alert and oriented to person, place, and time  Skin: Skin is warm  She is not diaphoretic  Psychiatric: She has a normal mood and affect  Her behavior is normal  Judgment and thought content normal        Medications      Morning Afternoon Evening Bedtime As Needed     amitriptyline 50 mg tablet   Commonly known as: ELAVIL   Take 50 mg by mouth daily at bedtime   Refills: 0        11/29/18 9pm      b complex-C-folic acid 0 8 mg tablet   Take 0 8 mg by mouth daily with dinner   Refills: 0       11/29/18 6pm       ergocalciferol 15519 units capsule   Commonly known as: ERGOCALCIFEROL   Take 50,000 Units by mouth once a week   Refills: 0     12/5/18         ferrous sulfate 325 (65 Fe) mg tablet   Take 325 mg by mouth 2 (two) times a day with meals   Refills: 0        11/29/18 6pm       magnesium oxide 400 mg   Commonly known as: MAG-OX   Take 1 tablet (400 mg total) by mouth daily   Refills: 0     11/30/18         meclizine 25 mg tablet   Commonly known as: ANTIVERT   Take 25 mg by mouth daily at bedtime   Refills: 0        11/29/18 9pm      potassium chloride 10 %   Take 30 mL (40 mEq total) by mouth 4 (four) times a day   Refills: 0       11/29/18 12pm          sodium bicarbonate 650 mg tablet   Take 1 tablet (650 mg total) by mouth 2 (two) times a day   Refills: 0        11/29/18 6pm       spironolactone 50 mg tablet   Commonly known as: ALDACTONE   Take 1 tablet (50 mg total) by mouth daily   Refills: 0     11/30/18         thiamine 100 MG tablet   Take 100 mg by mouth daily   Refills: 0     11/30/18         zinc gluconate 50 mg tablet   Take 50 mg by mouth daily   Refills: 0     11/30/18              Allergies  Allergies   Allergen Reactions    Nsaids      Other reaction(s):  Other (Please comment)  Should not take s/p bariatric surgery       Diet restrictions: none  Activity restrictions: none  Code Status: Level 1 - Full Code  Advance Directive and Living Will: <no information>  Power of :    POLST:      Discharge Condition: stable      Discharge  Statement   I spent 30 minutes discharging the patient  This time was spent on the day of discharge  I had direct contact with the patient on the day of discharge  Additional documentation is required if more than 30 minutes were spent on discharge

## 2018-11-29 NOTE — PROGRESS NOTES
Progress Note - Nephrology   Divya Peterson 28 y o  female MRN: [id number]  Unit/Bed#: 815-90 Encounter: [id number]    A/P:  1  Hypokalemia: she has kaliuresis in spite of spironolactone  Am reluctant to increase spironolactone as she is volume depleted with a low FeNa of 0 17%  Will start salt tablets and increase magnesium to bid  Agree with normal saline and IV KCl  11/29: potassium has normalized to 4 0  She denies any laxative use or diarrhea and any use of diuretics other than spironolactone  I have urged her to eat more protein 80-100g a day  She is quite stringent with her diet due to massive weight loss after a gastric bypass 4 years ago and has volume depletion and metabolic acidosis She has marked hypoalbuminemia     This is contributed to by use of spironolactone  She will need sodium bicarbonate tabs on discharge 650mg twice a day  Will stop salt tabs  2  Hypophosphatemia: will check urinary phosphorus  Although she has no aminoaciduria, she has renal leakage of potassium  Will start Na-K phosphate packets  11/29: would continue sodium phosphate tabs and follow with labs on Monday  3  Volume depletion: on IVF    IVF stopped          Follow up reason for today's visit:  hypokalemia    Hypokalemia    Patient Active Problem List   Diagnosis    Hypokalemia    History of gastric bypass    Migraine without aura and without status migrainosus, not intractable    Left-sided weakness    Hypophosphatemia    Anemia    Vitamin D deficiency    Weakness of both lower extremities    Weakness of both upper extremities    Elevated CPK    Vitamin B12 deficiency    Cervical lymphadenopathy    Generalized weakness    Paresthesia of both lower extremities    Paresthesias    B12 deficiency    Gastric bypass status for obesity    GERD (gastroesophageal reflux disease)    Migraine    WAGNER (obstructive sleep apnea)    Other intestinal malabsorption         Subjective:   Denies headaches, dizziness, chest pain, dyspnea or abdominal pain  Voiding well    Objective:     Vitals: Blood pressure (!) 103/48, pulse 75, temperature 98 6 °F (37 °C), temperature source Temporal, resp  rate 16, height 5' 3" (1 6 m), weight 69 1 kg (152 lb 4 8 oz), SpO2 99 %  ,Body mass index is 26 98 kg/m²  Weight (last 2 days)     None            Intake/Output Summary (Last 24 hours) at 11/29/18 0943  Last data filed at 11/29/18 0640   Gross per 24 hour   Intake           2807 5 ml   Output                0 ml   Net           2807 5 ml     I/O last 3 completed shifts: In: 2807 5 [P O :720; I V :2087 5]  Out: -          Physical Exam: BP (!) 103/48   Pulse 75   Temp 98 6 °F (37 °C) (Temporal)   Resp 16   Ht 5' 3" (1 6 m)   Wt 69 1 kg (152 lb 4 8 oz)   SpO2 99%   BMI 26 98 kg/m²     General Appearance:    Alert, cooperative, no distress, appears stated age   Head:    Normocephalic, without obvious abnormality, atraumatic   Eyes:    Conjunctiva/corneas clear   Ears:    Normal external ears   Nose:   Nares normal, septum midline, mucosa normal, no drainage    or sinus tenderness   Throat:   Lips, mucosa, and tongue normal; teeth and gums normal   Neck:   Supple, symmetrical, trachea midline, no adenopathy;        thyroid:  No enlargement/tenderness/nodules; no carotid    bruit or JVD   Back:     Symmetric, no curvature, ROM normal, no CVA tenderness   Lungs:     Clear to auscultation bilaterally, respirations unlabored   Chest wall:    No tenderness or deformity   Heart:    Regular rate and rhythm, S1 and S2 normal, no murmur, rub   or gallop   Abdomen:     Soft, non-tender, bowel sounds active   Extremities:   Extremities normal, atraumatic, no cyanosis or edema   Skin:   Skin color, texture, turgor normal, no rashes or lesions   Lymph nodes:   Cervical normal   Neurologic:   CNII-XII intact            Lab, Imaging and other studies: I have personally reviewed pertinent labs    CBC: No results found for: WBC, HGB, HCT, MCV, PLT, ADJUSTEDWBC, MCH, MCHC, RDW, MPV, NRBC  CMP:   Lab Results   Component Value Date    K 4 0 11/29/2018     (H) 11/29/2018    CO2 18 (L) 11/29/2018    BUN 5 11/29/2018    CREATININE 0 63 11/29/2018    CALCIUM 7 5 (L) 11/29/2018    EGFR 117 11/29/2018         Results from last 7 days  Lab Units 11/29/18  0552 11/28/18  1456 11/28/18  0531  11/27/18  0601 11/26/18  1757   POTASSIUM mmol/L 4 0 4 8 3 3*  < > 2 9* 2 4*   CHLORIDE mmol/L 112* 114* 113*  < > 108 107   CO2 mmol/L 18* 18* 20*  < > 20* 24   BUN mg/dL 5 7 9  < > 14 17   CREATININE mg/dL 0 63 0 83 0 69  < > 0 92 1 02   CALCIUM mg/dL 7 5* 7 5* 7 4*  < > 8 1* 8 4   ALK PHOS U/L  --   --  34*  --  38* 45*   ALT U/L  --   --  21  --  25 31   AST U/L  --   --  14  --  17 19   < > = values in this interval not displayed  Phosphorus:   Lab Results   Component Value Date    PHOS 2 5 (L) 11/29/2018     Magnesium:   Lab Results   Component Value Date    MG 1 9 11/29/2018     Urinalysis:   Lab Results   Component Value Date    COLORU Yellow 11/28/2018    CLARITYU Slightly Cloudy 11/28/2018    SPECGRAV 1 025 11/28/2018    PHUR 6 0 11/28/2018    LEUKOCYTESUR Negative 11/28/2018    NITRITE Negative 11/28/2018    GLUCOSEU Negative 11/28/2018    KETONESU Negative 11/28/2018    BILIRUBINUR Negative 11/28/2018    BLOODU Negative 11/28/2018     Ionized Calcium: No results found for: CAION  Coagulation: No results found for: PT, INR, APTT  Troponin: No results found for: TROPONINI  ABG: No results found for: PHART, BLI4XBE, PO2ART, EOM5ZGY, I7MKFXSB, BEART, SOURCE  Radiology review:     IMAGING  No results found      Current Facility-Administered Medications   Medication Dose Route Frequency    acetaminophen (TYLENOL) tablet 650 mg  650 mg Oral Q6H PRN    amitriptyline (ELAVIL) tablet 50 mg  50 mg Oral HS    b complex-vitamin C-folic acid (NEPHROCAPS) capsule 1 capsule  1 capsule Oral Daily With Dinner    enoxaparin (LOVENOX) subcutaneous injection 40 mg  40 mg Subcutaneous Q24H    ergocalciferol (VITAMIN D2) capsule 50,000 Units  50,000 Units Oral Weekly    ferrous sulfate tablet 325 mg  325 mg Oral BID With Meals    magnesium oxide (MAG-OX) tablet 400 mg  400 mg Oral BID    meclizine (ANTIVERT) tablet 25 mg  25 mg Oral HS    ondansetron (ZOFRAN) injection 4 mg  4 mg Intravenous Q6H PRN    potassium chloride 10 % oral solution 40 mEq  40 mEq Oral 4x Daily    sodium bicarbonate tablet 650 mg  650 mg Oral BID after meals    sodium chloride tablet 1 g  1 g Oral BID With Meals    spironolactone (ALDACTONE) tablet 50 mg  50 mg Oral Daily    thiamine (VITAMIN B1) tablet 100 mg  100 mg Oral Daily    zinc gluconate tablet 50 mg  50 mg Oral Daily     Medications Discontinued During This Encounter   Medication Reason    magnesium oxide (MAG-OX) tablet 400 mg     sodium chloride 0 9 % with KCl 40 mEq/L infusion (premix)        Silvina Sweeney MD

## 2018-11-29 NOTE — UTILIZATION REVIEW
Continued Stay Review    Date: 11/29/18     Vital Signs: BP (!) 103/48   Pulse 75   Temp 98 6 °F (37 °C) (Temporal)   Resp 16   Ht 5' 3" (1 6 m)   Wt 69 1 kg (152 lb 4 8 oz)   SpO2 99%   BMI 26 98 kg/m²     Medications:   Scheduled Meds:   Current Facility-Administered Medications:  acetaminophen 650 mg Oral Q6H PRN   amitriptyline 50 mg Oral HS   b complex-vitamin C-folic acid 1 capsule Oral Daily With Dinner   enoxaparin 40 mg Subcutaneous Q24H   ergocalciferol 50,000 Units Oral Weekly   ferrous sulfate 325 mg Oral BID With Meals   magnesium oxide 400 mg Oral BID   meclizine 25 mg Oral HS   ondansetron 4 mg Intravenous Q6H PRN   potassium chloride 40 mEq Oral 4x Daily   sodium bicarbonate 650 mg Oral BID after meals   spironolactone 50 mg Oral Daily   thiamine 100 mg Oral Daily   zinc gluconate 50 mg Oral Daily       Abnormal Labs/Diagnostic Results:     Calcium = 7 5, Phosphorous = 2 5    Age/Sex: 28 y o  female     Assessment/Plan: 11/29/18 @ 272     =========================================================  11/29 Nephrology Consult:      11/29: would continue sodium phosphate tabs and follow with labs on Monday  3  Volume depletion: on IVF    IVF stopped    Discharge Plan: TBD

## 2018-11-30 ENCOUNTER — HOSPITAL ENCOUNTER (OUTPATIENT)
Dept: INFUSION CENTER | Facility: HOSPITAL | Age: 33
End: 2018-11-30

## 2018-11-30 LAB — VIT B6 SERPL-MCNC: 4.2 UG/L (ref 2–32.8)

## 2018-12-01 LAB — VIT B1 BLD-SCNC: 137.3 NMOL/L (ref 66.5–200)

## 2018-12-11 DIAGNOSIS — E87.6 HYPOKALEMIA: Primary | ICD-10-CM

## 2018-12-11 RX ORDER — POTASSIUM CHLORIDE 14.9 MG/ML
20 INJECTION INTRAVENOUS ONCE
Qty: 100 ML | Refills: 0 | Status: SHIPPED | OUTPATIENT
Start: 2018-12-11 | End: 2018-12-21 | Stop reason: HOSPADM

## 2018-12-12 RX ORDER — SODIUM CHLORIDE 9 MG/ML
20 INJECTION, SOLUTION INTRAVENOUS CONTINUOUS
Status: DISPENSED | OUTPATIENT
Start: 2018-12-13 | End: 2018-12-13

## 2018-12-13 ENCOUNTER — HOSPITAL ENCOUNTER (OUTPATIENT)
Dept: INFUSION CENTER | Facility: HOSPITAL | Age: 33
Discharge: HOME | End: 2018-12-13
Payer: COMMERCIAL

## 2018-12-13 VITALS
TEMPERATURE: 98.9 F | DIASTOLIC BLOOD PRESSURE: 59 MMHG | HEART RATE: 80 BPM | OXYGEN SATURATION: 97 % | RESPIRATION RATE: 18 BRPM | SYSTOLIC BLOOD PRESSURE: 122 MMHG

## 2018-12-13 PROCEDURE — 96365 THER/PROPH/DIAG IV INF INIT: CPT

## 2018-12-13 PROCEDURE — 96366 THER/PROPH/DIAG IV INF ADDON: CPT

## 2018-12-13 RX ADMIN — POTASSIUM CHLORIDE: 2 INJECTION, SOLUTION, CONCENTRATE INTRAVENOUS at 13:01

## 2018-12-18 ENCOUNTER — HOSPITAL ENCOUNTER (INPATIENT)
Facility: HOSPITAL | Age: 33
LOS: 3 days | Discharge: HOME | DRG: 422 | End: 2018-12-21
Attending: EMERGENCY MEDICINE | Admitting: INTERNAL MEDICINE
Payer: COMMERCIAL

## 2018-12-18 DIAGNOSIS — R20.2 PARESTHESIAS: ICD-10-CM

## 2018-12-18 DIAGNOSIS — G47.33 OSA (OBSTRUCTIVE SLEEP APNEA): ICD-10-CM

## 2018-12-18 DIAGNOSIS — E87.6 HYPOKALEMIA: Primary | ICD-10-CM

## 2018-12-18 PROBLEM — N17.9 ACUTE KIDNEY INJURY (HCC): Status: ACTIVE | Noted: 2018-12-18

## 2018-12-18 LAB
ANION GAP SERPL CALCULATED.3IONS-SCNC: 10 MMOL/L (ref 4–13)
ANION GAP SERPL CALCULATED.3IONS-SCNC: 13 MMOL/L (ref 4–13)
BASOPHILS # BLD AUTO: 0.05 THOUSANDS/ΜL (ref 0–0.1)
BASOPHILS NFR BLD AUTO: 1 % (ref 0–1)
BUN SERPL-MCNC: 15 MG/DL (ref 5–25)
BUN SERPL-MCNC: 18 MG/DL (ref 5–25)
CALCIUM SERPL-MCNC: 7.4 MG/DL (ref 8.3–10.1)
CALCIUM SERPL-MCNC: 8.6 MG/DL (ref 8.3–10.1)
CHLORIDE SERPL-SCNC: 102 MMOL/L (ref 100–108)
CHLORIDE SERPL-SCNC: 109 MMOL/L (ref 100–108)
CO2 SERPL-SCNC: 21 MMOL/L (ref 21–32)
CO2 SERPL-SCNC: 24 MMOL/L (ref 21–32)
CREAT SERPL-MCNC: 0.95 MG/DL (ref 0.6–1.3)
CREAT SERPL-MCNC: 1.23 MG/DL (ref 0.6–1.3)
EOSINOPHIL # BLD AUTO: 0.1 THOUSAND/ΜL (ref 0–0.61)
EOSINOPHIL NFR BLD AUTO: 2 % (ref 0–6)
ERYTHROCYTE [DISTWIDTH] IN BLOOD BY AUTOMATED COUNT: 14.6 % (ref 11.6–15.1)
GFR SERPL CREATININE-BSD FRML MDRD: 57 ML/MIN/1.73SQ M
GFR SERPL CREATININE-BSD FRML MDRD: 78 ML/MIN/1.73SQ M
GLUCOSE SERPL-MCNC: 85 MG/DL (ref 65–140)
GLUCOSE SERPL-MCNC: 89 MG/DL (ref 65–140)
HCT VFR BLD AUTO: 40.6 % (ref 34.8–46.1)
HGB BLD-MCNC: 13.8 G/DL (ref 11.5–15.4)
HOLD SPECIMEN: NORMAL
IMM GRANULOCYTES # BLD AUTO: 0.01 THOUSAND/UL (ref 0–0.2)
IMM GRANULOCYTES NFR BLD AUTO: 0 % (ref 0–2)
LYMPHOCYTES # BLD AUTO: 2.19 THOUSANDS/ΜL (ref 0.6–4.47)
LYMPHOCYTES NFR BLD AUTO: 35 % (ref 14–44)
MAGNESIUM SERPL-MCNC: 2.4 MG/DL (ref 1.6–2.6)
MCH RBC QN AUTO: 30.3 PG (ref 26.8–34.3)
MCHC RBC AUTO-ENTMCNC: 34 G/DL (ref 31.4–37.4)
MCV RBC AUTO: 89 FL (ref 82–98)
MONOCYTES # BLD AUTO: 0.45 THOUSAND/ΜL (ref 0.17–1.22)
MONOCYTES NFR BLD AUTO: 7 % (ref 4–12)
NEUTROPHILS # BLD AUTO: 3.41 THOUSANDS/ΜL (ref 1.85–7.62)
NEUTS SEG NFR BLD AUTO: 55 % (ref 43–75)
NRBC BLD AUTO-RTO: 0 /100 WBCS
PLATELET # BLD AUTO: 296 THOUSANDS/UL (ref 149–390)
PMV BLD AUTO: 10.5 FL (ref 8.9–12.7)
POTASSIUM SERPL-SCNC: 2.2 MMOL/L (ref 3.5–5.3)
POTASSIUM SERPL-SCNC: 3 MMOL/L (ref 3.5–5.3)
RBC # BLD AUTO: 4.55 MILLION/UL (ref 3.81–5.12)
SODIUM SERPL-SCNC: 139 MMOL/L (ref 136–145)
SODIUM SERPL-SCNC: 140 MMOL/L (ref 136–145)
WBC # BLD AUTO: 6.21 THOUSAND/UL (ref 4.31–10.16)

## 2018-12-18 PROCEDURE — 83735 ASSAY OF MAGNESIUM: CPT | Performed by: PHYSICIAN ASSISTANT

## 2018-12-18 PROCEDURE — 99222 1ST HOSP IP/OBS MODERATE 55: CPT | Performed by: INTERNAL MEDICINE

## 2018-12-18 PROCEDURE — 36415 COLL VENOUS BLD VENIPUNCTURE: CPT | Performed by: PHYSICIAN ASSISTANT

## 2018-12-18 PROCEDURE — 96365 THER/PROPH/DIAG IV INF INIT: CPT

## 2018-12-18 PROCEDURE — 85025 COMPLETE CBC W/AUTO DIFF WBC: CPT | Performed by: PHYSICIAN ASSISTANT

## 2018-12-18 PROCEDURE — 99284 EMERGENCY DEPT VISIT MOD MDM: CPT

## 2018-12-18 PROCEDURE — 80048 BASIC METABOLIC PNL TOTAL CA: CPT | Performed by: NURSE PRACTITIONER

## 2018-12-18 PROCEDURE — 80048 BASIC METABOLIC PNL TOTAL CA: CPT | Performed by: PHYSICIAN ASSISTANT

## 2018-12-18 PROCEDURE — 96361 HYDRATE IV INFUSION ADD-ON: CPT

## 2018-12-18 PROCEDURE — 93005 ELECTROCARDIOGRAM TRACING: CPT

## 2018-12-18 RX ORDER — AMITRIPTYLINE HYDROCHLORIDE 50 MG/1
50 TABLET, FILM COATED ORAL
Status: DISCONTINUED | OUTPATIENT
Start: 2018-12-18 | End: 2018-12-21 | Stop reason: HOSPADM

## 2018-12-18 RX ORDER — SODIUM CHLORIDE 9 MG/ML
100 INJECTION, SOLUTION INTRAVENOUS CONTINUOUS
Status: DISCONTINUED | OUTPATIENT
Start: 2018-12-18 | End: 2018-12-19

## 2018-12-18 RX ORDER — THIAMINE MONONITRATE (VIT B1) 100 MG
100 TABLET ORAL DAILY
Status: DISCONTINUED | OUTPATIENT
Start: 2018-12-19 | End: 2018-12-21 | Stop reason: HOSPADM

## 2018-12-18 RX ORDER — ACETAMINOPHEN 325 MG/1
650 TABLET ORAL EVERY 6 HOURS PRN
Status: DISCONTINUED | OUTPATIENT
Start: 2018-12-18 | End: 2018-12-21 | Stop reason: HOSPADM

## 2018-12-18 RX ORDER — FERROUS SULFATE 325(65) MG
325 TABLET ORAL 2 TIMES DAILY WITH MEALS
Status: DISCONTINUED | OUTPATIENT
Start: 2018-12-19 | End: 2018-12-21 | Stop reason: HOSPADM

## 2018-12-18 RX ORDER — ZINC SULFATE 50(220)MG
220 CAPSULE ORAL DAILY
Status: DISCONTINUED | OUTPATIENT
Start: 2018-12-19 | End: 2018-12-21 | Stop reason: HOSPADM

## 2018-12-18 RX ORDER — ONDANSETRON 2 MG/ML
4 INJECTION INTRAMUSCULAR; INTRAVENOUS EVERY 4 HOURS PRN
Status: DISCONTINUED | OUTPATIENT
Start: 2018-12-18 | End: 2018-12-21 | Stop reason: HOSPADM

## 2018-12-18 RX ORDER — CHOLECALCIFEROL (VITAMIN D3) 10 MCG
1 TABLET ORAL
Status: DISCONTINUED | OUTPATIENT
Start: 2018-12-19 | End: 2018-12-21 | Stop reason: HOSPADM

## 2018-12-18 RX ORDER — POTASSIUM CHLORIDE 14.9 MG/ML
40 INJECTION INTRAVENOUS ONCE
Status: COMPLETED | OUTPATIENT
Start: 2018-12-18 | End: 2018-12-18

## 2018-12-18 RX ORDER — POTASSIUM CHLORIDE 20 MEQ/1
40 TABLET, EXTENDED RELEASE ORAL ONCE
Status: COMPLETED | OUTPATIENT
Start: 2018-12-18 | End: 2018-12-18

## 2018-12-18 RX ORDER — POTASSIUM CHLORIDE 14.9 MG/ML
20 INJECTION INTRAVENOUS
Status: DISCONTINUED | OUTPATIENT
Start: 2018-12-18 | End: 2018-12-18

## 2018-12-18 RX ORDER — SPIRONOLACTONE 25 MG/1
50 TABLET ORAL DAILY
Status: DISCONTINUED | OUTPATIENT
Start: 2018-12-19 | End: 2018-12-19

## 2018-12-18 RX ORDER — SODIUM BICARBONATE 650 MG/1
650 TABLET ORAL 2 TIMES DAILY
Status: DISCONTINUED | OUTPATIENT
Start: 2018-12-18 | End: 2018-12-21 | Stop reason: HOSPADM

## 2018-12-18 RX ORDER — POTASSIUM CHLORIDE 14.9 MG/ML
20 INJECTION INTRAVENOUS
Status: COMPLETED | OUTPATIENT
Start: 2018-12-18 | End: 2018-12-19

## 2018-12-18 RX ADMIN — POTASSIUM CHLORIDE 20 MEQ: 200 INJECTION, SOLUTION INTRAVENOUS at 23:30

## 2018-12-18 RX ADMIN — POTASSIUM CHLORIDE 20 MEQ: 200 INJECTION, SOLUTION INTRAVENOUS at 17:46

## 2018-12-18 RX ADMIN — POTASSIUM CHLORIDE 40 MEQ: 1500 TABLET, EXTENDED RELEASE ORAL at 14:36

## 2018-12-18 RX ADMIN — SODIUM CHLORIDE 100 ML/HR: 0.9 INJECTION, SOLUTION INTRAVENOUS at 21:02

## 2018-12-18 RX ADMIN — SODIUM CHLORIDE 1000 ML: 0.9 INJECTION, SOLUTION INTRAVENOUS at 14:00

## 2018-12-18 RX ADMIN — SODIUM BICARBONATE 650 MG: 650 TABLET ORAL at 21:43

## 2018-12-18 RX ADMIN — POTASSIUM CHLORIDE 20 MEQ: 200 INJECTION, SOLUTION INTRAVENOUS at 21:02

## 2018-12-18 RX ADMIN — SODIUM CHLORIDE 1000 ML: 0.9 INJECTION, SOLUTION INTRAVENOUS at 17:51

## 2018-12-18 RX ADMIN — POTASSIUM CHLORIDE 40 MEQ: 200 INJECTION, SOLUTION INTRAVENOUS at 14:39

## 2018-12-18 RX ADMIN — SODIUM CHLORIDE 1000 ML: 0.9 INJECTION, SOLUTION INTRAVENOUS at 15:25

## 2018-12-18 RX ADMIN — AMITRIPTYLINE HYDROCHLORIDE 50 MG: 50 TABLET, FILM COATED ORAL at 21:43

## 2018-12-18 NOTE — ED PROVIDER NOTES
History  Chief Complaint   Patient presents with    Abnormal Lab     Patient states that she had blood work done yesterday morning and recieved a phone call this afternoon stating her potassium was 3 1     Patient presents to the emergency department today offering a chief complaint of low potassium levels  She states she had outpatient blood work yesterday which showed a potassium of 3 1  She has a long standing history of low potassium levels  Patient's nephrologist allegedly told her to come in  She takes 200 mEq of potassium daily  She admits to bilateral extremity numbness and tingling which is worse than normal   No chest pain or shortness of breath  Prior to Admission Medications   Prescriptions Last Dose Informant Patient Reported?  Taking?   amitriptyline (ELAVIL) 50 mg tablet   Yes Yes   Sig: Take 50 mg by mouth daily at bedtime   b complex-C-folic acid (NEPHRO-EDIE) 0 8 mg tablet   Yes Yes   Sig: Take 0 8 mg by mouth daily with dinner   ergocalciferol (ERGOCALCIFEROL) 21793 units capsule   Yes Yes   Sig: Take 50,000 Units by mouth once a week   ferrous sulfate 325 (65 Fe) mg tablet   Yes Yes   Sig: Take 325 mg by mouth 2 (two) times a day with meals   magnesium oxide (MAG-OX) 400 mg   No Yes   Sig: Take 1 tablet (400 mg total) by mouth daily   meclizine (ANTIVERT) 25 mg tablet   Yes Yes   Sig: Take 25 mg by mouth daily at bedtime   potassium chloride 10 %   No Yes   Sig: Take 30 mL (40 mEq total) by mouth 4 (four) times a day   potassium chloride 20 mEq/100 mL   No No   Sig: Infuse 100 mL (20 mEq total) into a venous catheter once for 1 dose   sodium bicarbonate 650 mg tablet   No Yes   Sig: Take 1 tablet (650 mg total) by mouth 2 (two) times a day   spironolactone (ALDACTONE) 50 mg tablet   No Yes   Sig: Take 1 tablet (50 mg total) by mouth daily   thiamine 100 MG tablet   Yes Yes   Sig: Take 100 mg by mouth daily   zinc gluconate 50 mg tablet   Yes Yes   Sig: Take 50 mg by mouth daily Facility-Administered Medications: None       Past Medical History:   Diagnosis Date    H  pylori infection     Hypokalemia     Migraine        Past Surgical History:   Procedure Laterality Date    ABDOMINAL SURGERY      APPENDECTOMY      CHOLECYSTECTOMY      GASTRIC BYPASS      HYSTERECTOMY         Family History   Problem Relation Age of Onset    Hypertension Father     Diabetes Father     Diabetes Maternal Grandfather      I have reviewed and agree with the history as documented  Social History   Substance Use Topics    Smoking status: Never Smoker    Smokeless tobacco: Never Used    Alcohol use Yes      Comment: socially        Review of Systems   Constitutional: Negative  HENT: Negative  Eyes: Negative  Respiratory: Negative  Cardiovascular: Negative  Gastrointestinal: Negative  Endocrine: Negative  Genitourinary: Negative  Musculoskeletal: Negative  Skin: Negative  Allergic/Immunologic: Negative  Neurological: Positive for numbness  Negative for dizziness, tremors, seizures, syncope, facial asymmetry, speech difficulty, weakness, light-headedness and headaches  Hematological: Negative  Psychiatric/Behavioral: Negative  All other systems reviewed and are negative  Physical Exam  Physical Exam   Constitutional: She is oriented to person, place, and time  She appears well-developed and well-nourished  No distress  HENT:   Head: Normocephalic  Eyes: Pupils are equal, round, and reactive to light  EOM are normal    Neck: Normal range of motion  Cardiovascular: Normal rate, regular rhythm, normal heart sounds and intact distal pulses  Exam reveals no gallop and no friction rub  No murmur heard  Pulmonary/Chest: Effort normal and breath sounds normal  No respiratory distress  She exhibits no tenderness  Abdominal: Soft  Bowel sounds are normal  She exhibits no distension and no mass  There is no tenderness   There is no rebound and no guarding  No hernia  Musculoskeletal: Normal range of motion  Neurological: She is alert and oriented to person, place, and time  Skin: Skin is warm  Capillary refill takes less than 2 seconds  She is not diaphoretic  Psychiatric: She has a normal mood and affect  Vitals reviewed  Vital Signs  ED Triage Vitals [12/18/18 1300]   Temperature Pulse Respirations Blood Pressure SpO2   98 9 °F (37 2 °C) 95 18 127/74 96 %      Temp Source Heart Rate Source Patient Position - Orthostatic VS BP Location FiO2 (%)   Temporal Monitor Sitting Right arm --      Pain Score       No Pain           Vitals:    12/18/18 1300   BP: 127/74   Pulse: 95   Patient Position - Orthostatic VS: Sitting       Visual Acuity      ED Medications  Medications   potassium chloride 20 mEq IVPB (premix) (40 mEq Intravenous New Bag 12/18/18 1439)   sodium chloride 0 9 % bolus 1,000 mL (0 mL Intravenous Stopped 12/18/18 1523)   potassium chloride (K-DUR,KLOR-CON) CR tablet 40 mEq (40 mEq Oral Given 12/18/18 1436)       Diagnostic Studies  Results Reviewed     Procedure Component Value Units Date/Time    [place] draw [[id number]] Collected:  12/18/18 1357    Lab Status:  Final result Specimen:  Blood Updated:  12/18/18 1502    Narrative: The following orders were created for panel order [place] draw  Procedure                               Abnormality         Status                     ---------                               -----------         ------                     Charisse Lo Top on RVYR[[id number]]                           Final result               Green / Black tube on YAKC[[id number]]                       Final result                 Please view results for these tests on the individual orders      Basic metabolic panel [[id number]]  (Abnormal) Collected:  12/18/18 1349    Lab Status:  Final result Specimen:  Blood from Arm, Left Updated:  12/18/18 1423     Sodium 139 mmol/L      Potassium 2 2 (LL) mmol/L      Chloride 102 mmol/L      CO2 24 mmol/L      ANION GAP 13 mmol/L      BUN 18 mg/dL      Creatinine 1 23 mg/dL      Glucose 85 mg/dL      Calcium 8 6 mg/dL      eGFR 57 ml/min/1 73sq m     Narrative:         National Kidney Disease Education Program recommendations are as follows:  GFR calculation is accurate only with a steady state creatinine  Chronic Kidney disease less than 60 ml/min/1 73 sq  meters  Kidney failure less than 15 ml/min/1 73 sq  meters      Magnesium [[id number]]  (Normal) Collected:  12/18/18 1349    Lab Status:  Final result Specimen:  Blood from Arm, Left Updated:  12/18/18 1422     Magnesium 2 4 mg/dL     CBC and differential [[id number]] Collected:  12/18/18 1349    Lab Status:  Final result Specimen:  Blood from Arm, Left Updated:  12/18/18 1402     WBC 6 21 Thousand/uL      RBC 4 55 Million/uL      Hemoglobin 13 8 g/dL      Hematocrit 40 6 %      MCV 89 fL      MCH 30 3 pg      MCHC 34 0 g/dL      RDW 14 6 %      MPV 10 5 fL      Platelets 945 Thousands/uL      nRBC 0 /100 WBCs      Neutrophils Relative 55 %      Immat GRANS % 0 %      Lymphocytes Relative 35 %      Monocytes Relative 7 %      Eosinophils Relative 2 %      Basophils Relative 1 %      Neutrophils Absolute 3 41 Thousands/µL      Immature Grans Absolute 0 01 Thousand/uL      Lymphocytes Absolute 2 19 Thousands/µL      Monocytes Absolute 0 45 Thousand/µL      Eosinophils Absolute 0 10 Thousand/µL      Basophils Absolute 0 05 Thousands/µL                  No orders to display              Procedures  Procedures       Phone Contacts  ED Phone Contact    ED Course  ED Course as of Dec 18 1524   Tue Dec 18, 2018   1310 Blood Pressure: 127/74   1310 Temperature: 98 9 °F (37 2 °C)   1310 Pulse: 95   1310 Respirations: 18   1403 WBC: 6 21   1404 Hemoglobin: 13 8   1404 Platelet Count: 268   1423 Sodium: 139   1423 eGFR: 57   1423 WBC: 6 21   1423 Hemoglobin: 13 8   1423 Magnesium: 2 4   1521 I was able to consult Dr Estelita Peck, who recommended "please keep giving another 100IV tonight, check labd around 7pm"    SLIM aware          HEART Risk Score      Most Recent Value   History  0 Filed at: 12/18/2018 1344   ECG  0 Filed at: 12/18/2018 1344   Age  0 Filed at: 12/18/2018 1344   Risk Factors  0 Filed at: 12/18/2018 1344   Troponin  0 Filed at: 12/18/2018 1344   Heart Score Risk Calculator   History  0 Filed at: 12/18/2018 1344   ECG  0 Filed at: 12/18/2018 1344   Age  0 Filed at: 12/18/2018 1344   Risk Factors  0 Filed at: 12/18/2018 1344   Troponin  0 Filed at: 12/18/2018 1344   HEART Score  0 Filed at: 12/18/2018 1344   HEART Score  0 Filed at: 12/18/2018 1344                            MDM  The patient presented with a condition in which there was a high probability of imminent or life-threatening deterioration, and critical care services (excluding separately billable procedures) totalled 30-74 minutes (Patient in need of intravenous potassium)  Disposition  Final diagnoses:   Hypokalemia   Paresthesias     Time reflects when diagnosis was documented in both MDM as applicable and the Disposition within this note     Time User Action Codes Description Comment    12/18/2018  2:30 PM Merlinda Slocumb Add [E87 6] Hypokalemia     12/18/2018  3:20 PM Merlinda Slocumb Add [R20 2] Paresthesias       ED Disposition     ED Disposition Condition Comment    Admit  Case was discussed with Dr Prudence Mora and the patient's admission status was agreed to be Admission Status: inpatient status to the service of Dr Prudence Mora   Follow-up Information    None         Patient's Medications   Discharge Prescriptions    No medications on file     No discharge procedures on file      ED Provider  Electronically Signed by           Drew Olivares PA-C  12/18/18 [id number] Manjeet Bocanegra PA-C  12/18/18 0426

## 2018-12-18 NOTE — ED NOTES
Came in B/C of low potassium 2nd to her Dr who send her here, has tingeing feeling in hands, feet up to knees, also started in face yesterday, feel like pins and needles     Nelle Oppenheim, ELYSSA  12/18/18 [id number] LISSA Aguilera RN  12/18/18 1312

## 2018-12-18 NOTE — ED PROCEDURE NOTE
Procedure  ECG 12 Lead Documentation  Date/Time: 12/18/2018 1:44 PM  Performed by: Gavi Chu  Authorized by: Gavi Chu     Indications / Diagnosis:  Abnormal K  ECG reviewed by me, the ED Provider: yes    Patient location:  ED  Previous ECG:     Comparison to cardiac monitor: Yes    Interpretation:     Interpretation: normal    Rate:     ECG rate:  80    ECG rate assessment: normal    Rhythm:     Rhythm: sinus rhythm    Ectopy:     Ectopy: none    QRS:     QRS axis:  Normal    QRS intervals:  Normal  Conduction:     Conduction: normal    ST segments:     ST segments:  Normal  T waves:     T waves: normal                       Belkys Walker PA-C  12/18/18 1349

## 2018-12-19 LAB
ALBUMIN SERPL BCP-MCNC: 2.8 G/DL (ref 3.5–5)
ALP SERPL-CCNC: 37 U/L (ref 46–116)
ALT SERPL W P-5'-P-CCNC: 23 U/L (ref 12–78)
ANION GAP SERPL CALCULATED.3IONS-SCNC: 11 MMOL/L (ref 4–13)
ANION GAP SERPL CALCULATED.3IONS-SCNC: 9 MMOL/L (ref 4–13)
AST SERPL W P-5'-P-CCNC: 19 U/L (ref 5–45)
ATRIAL RATE: 80 BPM
BASOPHILS # BLD AUTO: 0.05 THOUSANDS/ΜL (ref 0–0.1)
BASOPHILS NFR BLD AUTO: 1 % (ref 0–1)
BILIRUB SERPL-MCNC: 0.2 MG/DL (ref 0.2–1)
BUN SERPL-MCNC: 11 MG/DL (ref 5–25)
BUN SERPL-MCNC: 14 MG/DL (ref 5–25)
CALCIUM SERPL-MCNC: 7.3 MG/DL (ref 8.3–10.1)
CALCIUM SERPL-MCNC: 7.6 MG/DL (ref 8.3–10.1)
CHLORIDE SERPL-SCNC: 109 MMOL/L (ref 100–108)
CHLORIDE SERPL-SCNC: 111 MMOL/L (ref 100–108)
CK SERPL-CCNC: 41 U/L (ref 26–192)
CO2 SERPL-SCNC: 17 MMOL/L (ref 21–32)
CO2 SERPL-SCNC: 20 MMOL/L (ref 21–32)
CREAT SERPL-MCNC: 0.81 MG/DL (ref 0.6–1.3)
CREAT SERPL-MCNC: 0.82 MG/DL (ref 0.6–1.3)
EOSINOPHIL # BLD AUTO: 0.14 THOUSAND/ΜL (ref 0–0.61)
EOSINOPHIL NFR BLD AUTO: 2 % (ref 0–6)
ERYTHROCYTE [DISTWIDTH] IN BLOOD BY AUTOMATED COUNT: 14.9 % (ref 11.6–15.1)
GFR SERPL CREATININE-BSD FRML MDRD: 94 ML/MIN/1.73SQ M
GFR SERPL CREATININE-BSD FRML MDRD: 95 ML/MIN/1.73SQ M
GLUCOSE SERPL-MCNC: 147 MG/DL (ref 65–140)
GLUCOSE SERPL-MCNC: 85 MG/DL (ref 65–140)
HCT VFR BLD AUTO: 33.2 % (ref 34.8–46.1)
HGB BLD-MCNC: 11 G/DL (ref 11.5–15.4)
IMM GRANULOCYTES # BLD AUTO: 0.01 THOUSAND/UL (ref 0–0.2)
IMM GRANULOCYTES NFR BLD AUTO: 0 % (ref 0–2)
LACTATE SERPL-SCNC: 0.5 MMOL/L (ref 0.5–2)
LYMPHOCYTES # BLD AUTO: 2.29 THOUSANDS/ΜL (ref 0.6–4.47)
LYMPHOCYTES NFR BLD AUTO: 37 % (ref 14–44)
MAGNESIUM SERPL-MCNC: 2 MG/DL (ref 1.6–2.6)
MCH RBC QN AUTO: 30.6 PG (ref 26.8–34.3)
MCHC RBC AUTO-ENTMCNC: 33.1 G/DL (ref 31.4–37.4)
MCV RBC AUTO: 92 FL (ref 82–98)
MONOCYTES # BLD AUTO: 0.47 THOUSAND/ΜL (ref 0.17–1.22)
MONOCYTES NFR BLD AUTO: 8 % (ref 4–12)
NEUTROPHILS # BLD AUTO: 3.19 THOUSANDS/ΜL (ref 1.85–7.62)
NEUTS SEG NFR BLD AUTO: 52 % (ref 43–75)
NRBC BLD AUTO-RTO: 0 /100 WBCS
P AXIS: 62 DEGREES
PHOSPHATE SERPL-MCNC: 3.2 MG/DL (ref 2.7–4.5)
PLATELET # BLD AUTO: 229 THOUSANDS/UL (ref 149–390)
PMV BLD AUTO: 10.4 FL (ref 8.9–12.7)
POTASSIUM SERPL-SCNC: 2.9 MMOL/L (ref 3.5–5.3)
POTASSIUM SERPL-SCNC: 3.3 MMOL/L (ref 3.5–5.3)
PR INTERVAL: 116 MS
PROCALCITONIN SERPL-MCNC: <0.05 NG/ML
PROT SERPL-MCNC: 5.5 G/DL (ref 6.4–8.2)
QRS AXIS: 34 DEGREES
QRSD INTERVAL: 102 MS
QT INTERVAL: 374 MS
QTC INTERVAL: 431 MS
RBC # BLD AUTO: 3.6 MILLION/UL (ref 3.81–5.12)
SODIUM SERPL-SCNC: 137 MMOL/L (ref 136–145)
SODIUM SERPL-SCNC: 140 MMOL/L (ref 136–145)
T WAVE AXIS: 53 DEGREES
TROPONIN I SERPL-MCNC: <0.02 NG/ML
TSH SERPL DL<=0.05 MIU/L-ACNC: 1.98 UIU/ML (ref 0.36–3.74)
VENTRICULAR RATE: 80 BPM
WBC # BLD AUTO: 6.15 THOUSAND/UL (ref 4.31–10.16)

## 2018-12-19 PROCEDURE — 84484 ASSAY OF TROPONIN QUANT: CPT | Performed by: INTERNAL MEDICINE

## 2018-12-19 PROCEDURE — 83735 ASSAY OF MAGNESIUM: CPT | Performed by: INTERNAL MEDICINE

## 2018-12-19 PROCEDURE — 99232 SBSQ HOSP IP/OBS MODERATE 35: CPT | Performed by: INTERNAL MEDICINE

## 2018-12-19 PROCEDURE — 85025 COMPLETE CBC W/AUTO DIFF WBC: CPT | Performed by: INTERNAL MEDICINE

## 2018-12-19 PROCEDURE — 84100 ASSAY OF PHOSPHORUS: CPT | Performed by: INTERNAL MEDICINE

## 2018-12-19 PROCEDURE — 84443 ASSAY THYROID STIM HORMONE: CPT | Performed by: INTERNAL MEDICINE

## 2018-12-19 PROCEDURE — 93010 ELECTROCARDIOGRAM REPORT: CPT | Performed by: INTERNAL MEDICINE

## 2018-12-19 PROCEDURE — 80048 BASIC METABOLIC PNL TOTAL CA: CPT | Performed by: INTERNAL MEDICINE

## 2018-12-19 PROCEDURE — 36415 COLL VENOUS BLD VENIPUNCTURE: CPT | Performed by: INTERNAL MEDICINE

## 2018-12-19 PROCEDURE — 83605 ASSAY OF LACTIC ACID: CPT | Performed by: INTERNAL MEDICINE

## 2018-12-19 PROCEDURE — 84145 PROCALCITONIN (PCT): CPT | Performed by: INTERNAL MEDICINE

## 2018-12-19 PROCEDURE — 80053 COMPREHEN METABOLIC PANEL: CPT | Performed by: INTERNAL MEDICINE

## 2018-12-19 PROCEDURE — 82550 ASSAY OF CK (CPK): CPT | Performed by: INTERNAL MEDICINE

## 2018-12-19 RX ORDER — SPIRONOLACTONE 25 MG/1
50 TABLET ORAL DAILY
Status: DISCONTINUED | OUTPATIENT
Start: 2018-12-19 | End: 2018-12-21 | Stop reason: HOSPADM

## 2018-12-19 RX ORDER — POTASSIUM CHLORIDE 20 MEQ/1
40 TABLET, EXTENDED RELEASE ORAL 4 TIMES DAILY
Status: DISCONTINUED | OUTPATIENT
Start: 2018-12-19 | End: 2018-12-20

## 2018-12-19 RX ORDER — POTASSIUM CHLORIDE 14.9 MG/ML
20 INJECTION INTRAVENOUS
Status: COMPLETED | OUTPATIENT
Start: 2018-12-19 | End: 2018-12-20

## 2018-12-19 RX ORDER — POTASSIUM CHLORIDE 14.9 MG/ML
20 INJECTION INTRAVENOUS
Status: DISCONTINUED | OUTPATIENT
Start: 2018-12-19 | End: 2018-12-19

## 2018-12-19 RX ADMIN — MAGNESIUM OXIDE TAB 400 MG (241.3 MG ELEMENTAL MG) 400 MG: 400 (241.3 MG) TAB at 09:50

## 2018-12-19 RX ADMIN — SODIUM BICARBONATE 650 MG: 650 TABLET ORAL at 17:22

## 2018-12-19 RX ADMIN — POTASSIUM CHLORIDE 20 MEQ: 200 INJECTION, SOLUTION INTRAVENOUS at 01:42

## 2018-12-19 RX ADMIN — POTASSIUM CHLORIDE 40 MEQ: 1500 TABLET, EXTENDED RELEASE ORAL at 13:52

## 2018-12-19 RX ADMIN — POTASSIUM CHLORIDE 20 MEQ: 200 INJECTION, SOLUTION INTRAVENOUS at 17:53

## 2018-12-19 RX ADMIN — Medication 220 MG: at 09:51

## 2018-12-19 RX ADMIN — FERROUS SULFATE TAB 325 MG (65 MG ELEMENTAL FE) 325 MG: 325 (65 FE) TAB at 09:52

## 2018-12-19 RX ADMIN — SODIUM BICARBONATE 650 MG: 650 TABLET ORAL at 09:50

## 2018-12-19 RX ADMIN — POTASSIUM CHLORIDE 20 MEQ: 200 INJECTION, SOLUTION INTRAVENOUS at 13:19

## 2018-12-19 RX ADMIN — SODIUM CHLORIDE 100 ML/HR: 0.9 INJECTION, SOLUTION INTRAVENOUS at 13:46

## 2018-12-19 RX ADMIN — AMITRIPTYLINE HYDROCHLORIDE 50 MG: 50 TABLET, FILM COATED ORAL at 21:19

## 2018-12-19 RX ADMIN — POTASSIUM CHLORIDE 20 MEQ: 200 INJECTION, SOLUTION INTRAVENOUS at 08:37

## 2018-12-19 RX ADMIN — FERROUS SULFATE TAB 325 MG (65 MG ELEMENTAL FE) 325 MG: 325 (65 FE) TAB at 17:22

## 2018-12-19 RX ADMIN — Medication 100 MG: at 09:50

## 2018-12-19 RX ADMIN — POTASSIUM CHLORIDE 20 MEQ: 200 INJECTION, SOLUTION INTRAVENOUS at 15:44

## 2018-12-19 RX ADMIN — POTASSIUM CHLORIDE 40 MEQ: 1500 TABLET, EXTENDED RELEASE ORAL at 08:34

## 2018-12-19 RX ADMIN — POTASSIUM CHLORIDE 20 MEQ: 200 INJECTION, SOLUTION INTRAVENOUS at 20:02

## 2018-12-19 RX ADMIN — SODIUM BICARBONATE 75 ML/HR: 84 INJECTION, SOLUTION INTRAVENOUS at 15:15

## 2018-12-19 RX ADMIN — POTASSIUM CHLORIDE 40 MEQ: 1500 TABLET, EXTENDED RELEASE ORAL at 21:19

## 2018-12-19 RX ADMIN — POTASSIUM CHLORIDE 20 MEQ: 200 INJECTION, SOLUTION INTRAVENOUS at 22:16

## 2018-12-19 RX ADMIN — SPIRONOLACTONE 50 MG: 25 TABLET ORAL at 09:59

## 2018-12-19 RX ADMIN — POTASSIUM CHLORIDE 40 MEQ: 1500 TABLET, EXTENDED RELEASE ORAL at 17:22

## 2018-12-19 RX ADMIN — Medication 1 CAPSULE: at 17:22

## 2018-12-19 NOTE — ASSESSMENT & PLAN NOTE
· Likely prerenal azotemia  · Normal saline IV fluids  · Avoid all nephrotoxic agents  · Serial laboratory testing to monitor her renal function and electrolytes  · Consult Nephrology

## 2018-12-19 NOTE — H&P
H&P- Kaitlin Blanca 1985, 28 y o  female MRN: [id number]    Unit/Bed#: RM04 Encounter: [id number]    Primary Care Provider: ARIADNA Rolon   Date and time admitted to hospital: 12/18/2018  1:09 PM        * Hypokalemia   Assessment & Plan    · Replete with PO and IV potassium chloride supplementation  · Consult Nephrology  · Follow the potassium level and magnesium level     Acute kidney injury ([address] )   Assessment & Plan    · Likely prerenal azotemia  · Normal saline IV fluids  · Avoid all nephrotoxic agents  · Serial laboratory testing to monitor her renal function and electrolytes  · Consult Nephrology     WAGNER (obstructive sleep apnea)   Assessment & Plan    · Outpatient sleep study     Vitamin D deficiency   Assessment & Plan    · Continue vitamin D supplementation           VTE Prophylaxis: Enoxaparin (Lovenox)  / sequential compression device   Code Status:  Level 1-Full Code      Anticipated Length of Stay:  Patient will be admitted on an Inpatient basis with an anticipated length of stay of greater than 2 midnights  Justification for Hospital Stay:  The patient requires potassium chloride supplementation, continuous IV fluids, and serial laboratory testing to monitor her renal function and electrolytes  Total Time for Visit, including Counseling / Coordination of Care: 45 minutes  Greater than 50% of this total time spent on direct patient counseling and coordination of care  Chief Complaint:  Generalized weakness and paresthesias    History of Present Illness:    Kaitlin Blanca is a 28 y o  female who is well-known to me from previous hospitalizations for hypokalemia  The patient began experiencing lower extremity paresthesias over the last 3-4 days, which typically correlates with mild hypokalemia  The patient then developed facial paresthesias, difficulty concentrating, and generalized weakness  These symptoms typically correlate to more severe hypokalemia for the patient    Nothing seemed to improve her symptoms  Associated symptoms included palpitations and lightheadedness  In addition, the patient has observed an unintentional weight loss of approximately 6-8 lbs  , which tends to correlate with the development of severe hypokalemia  No fevers or chills  No nausea, vomiting, or diarrhea  She had outpatient blood work on 12/17/2018 at [facility], and she was found to have a potassium level of 3 1 mmol/L  In the emergency department today (12/18/2018), she was found to have a potassium level of 2 2 mmol/L  The patient did received an outpatient infusion of 40 meQ potassium chloride on 12/13/2018  Review of Systems:    Review of Systems:  Per HPI, all other systems have been reviewed and were negative  Past Medical and Surgical History:     Past Medical History:   Diagnosis Date    H  pylori infection     Hypokalemia     Migraine        Past Surgical History:   Procedure Laterality Date    ABDOMINAL SURGERY      APPENDECTOMY      CHOLECYSTECTOMY      GASTRIC BYPASS      HYSTERECTOMY         Meds/Allergies:    Prior to Admission medications    Medication Sig Start Date End Date Taking?  Authorizing Provider   amitriptyline (ELAVIL) 50 mg tablet Take 50 mg by mouth daily at bedtime 8/27/18 8/27/19 Yes Historical Provider, MD   b complex-C-folic acid (NEPHRO-EDIE) 0 8 mg tablet Take 0 8 mg by mouth daily with dinner   Yes Historical Provider, MD   ergocalciferol (ERGOCALCIFEROL) 41446 units capsule Take 50,000 Units by mouth once a week   Yes Historical Provider, MD   ferrous sulfate 325 (65 Fe) mg tablet Take 325 mg by mouth 2 (two) times a day with meals   Yes Historical Provider, MD   magnesium oxide (MAG-OX) 400 mg Take 1 tablet (400 mg total) by mouth daily 11/6/18  Yes Gabby Tan MD   meclizine (ANTIVERT) 25 mg tablet Take 25 mg by mouth daily at bedtime 8/27/18  Yes Historical Provider, MD   potassium chloride 10 % Take 30 mL (40 mEq total) by mouth 4 (four) times a day 11/6/18  Yes Jordan Haddad MD   sodium bicarbonate 650 mg tablet Take 1 tablet (650 mg total) by mouth 2 (two) times a day 11/29/18  Yes Luther Serrano DO   spironolactone (ALDACTONE) 50 mg tablet Take 1 tablet (50 mg total) by mouth daily 11/6/18  Yes Jordan Haddad MD   thiamine 100 MG tablet Take 100 mg by mouth daily   Yes Historical Provider, MD   zinc gluconate 50 mg tablet Take 50 mg by mouth daily   Yes Historical Provider, MD   potassium chloride 20 mEq/100 mL Infuse 100 mL (20 mEq total) into a venous catheter once for 1 dose 12/11/18 12/11/18  Quan Greene DO     I have reviewed home medications with patient personally  Allergies: Allergies   Allergen Reactions    Nsaids      Other reaction(s): Other (Please comment)  Should not take s/p bariatric surgery       Social History:     Marital Status: /Civil Union     Substance Use History:   History   Alcohol Use    Yes     Comment: socially     History   Smoking Status    Never Smoker   Smokeless Tobacco    Never Used     History   Drug Use No       Family History:    non-contributory    Physical Exam:     Vitals:   Blood Pressure: 102/50 (12/18/18 1900)  Pulse: 102 (12/18/18 1900)  Temperature: 97 6 °F (36 4 °C) (12/18/18 1600)  Temp Source: Temporal (12/18/18 1600)  Respirations: 17 (12/18/18 1900)  Weight - Scale: 66 7 kg (147 lb) (12/18/18 1300)  SpO2: 97 % (12/18/18 1900)    Physical Exam  General:  NAD, awake, alert, oriented x 3  HEENT:  NC/AT, mucous membranes moist  Neck:  Supple, No JVP elevation  CV:  + S1, + S2, Tachycardic, Regular rhythm  Pulm:  Lung fields are CTA bilaterally  Abd:  Soft, Non-tender, Non-distended  Ext:  No clubbing/cyanosis/edema  Skin:  No rashes      Additional Data:     Lab Results: I have personally reviewed pertinent reports          Results from last 7 days  Lab Units 12/18/18  1349   WBC Thousand/uL 6 21   HEMOGLOBIN g/dL 13 8   HEMATOCRIT % 40 6   PLATELETS Thousands/uL 296 NEUTROS PCT % 55   LYMPHS PCT % 35   MONOS PCT % 7   EOS PCT % 2       Results from last 7 days  Lab Units 12/18/18  1349   SODIUM mmol/L 139   POTASSIUM mmol/L 2 2*   CHLORIDE mmol/L 102   CO2 mmol/L 24   BUN mg/dL 18   CREATININE mg/dL 1 23   ANION GAP mmol/L 13   CALCIUM mg/dL 8 6   GLUCOSE RANDOM mg/dL 85                       Imaging: I have personally reviewed pertinent reports  No orders to display       EKG, Pathology, and Other Studies Reviewed on Admission:   · EKG (12/18/2018): Normal sinus rhythm with a heart rate of 80 bpm    All[facility] / [facility] Records Reviewed: Yes     ** Please Note: This note has been constructed using a voice recognition system   **

## 2018-12-19 NOTE — ED NOTES
Spoke with Regine ROSENTHAL about patients recent vitals including BP of 81/40  Patient stated her BP typically runs 90s/50s and she felt okay walking to the bathroom  Will continue with ordered IV fluids at a rate of 100mL/hr and continue to monitor patients BP        Manpreet Sewell RN  12/19/18 0234

## 2018-12-19 NOTE — ED NOTES
Spoke with Dr Nelson Rodriguez regarding potassium chloride order of 20mEq x 4 doses to start when 40mEq infusion completed       Homero Villasenor RN  12/18/18 2473

## 2018-12-19 NOTE — PROGRESS NOTES
Progress Note - Arlene Bowen 1985, 28 y o  female MRN: [id number]    Unit/Bed#: 413-01 Encounter: [id number]    Primary Care Provider: ARIADNA Hamilton   Date and time admitted to hospital: 2018  1:09 PM        * Hypokalemia   Assessment & Plan    · Replete with PO and IV potassium chloride supplementation per Nephrology's recommendation  · Follow the potassium level and magnesium level  · The patient will need a Mediport placement at some point for outpatient IV potassium chloride infusions     Acute kidney injury ([address] )   Assessment & Plan    · Likely prerenal azotemia  · Resolved   · IV fluids per Nephrology  · Avoid all nephrotoxic agents  · Serial laboratory testing to monitor her renal function and electrolytes  · I appreciate Nephrology's input     WAGNER (obstructive sleep apnea)   Assessment & Plan    · Outpatient sleep study     Vitamin D deficiency   Assessment & Plan    · Continue vitamin D supplementation         VTE Pharmacologic Prophylaxis:   Pharmacologic: Enoxaparin (Lovenox)  Mechanical VTE Prophylaxis in Place: Yes     Discussions with Specialists or Other Care Team Provider:  I discussed the case with Dr Cathi Ureña (Nephrology)  Time Spent for Care: 20 minutes  More than 50% of total time spent on counseling and coordination of care as described above  Current Length of Stay: 1 day(s)    Current Patient Status: Inpatient   Certification Statement: The patient will continue to require additional inpatient hospital stay due to the need for IV potassium chloride supplementation and the need for serial laboratory testing to monitor her potassium level  Code Status: Level 1 - Full Code      Subjective: The patient was seen and examined  The patient is doing somewhat better  Her facial paresthesias have resolved  No chest pain      Objective:     Vitals:   Temp (24hrs), Av 6 °F (36 4 °C), Min:97 5 °F (36 4 °C), Max:97 8 °F (36 6 °C)    Temp:  [97 5 °F (36 4 °C)-97 8 °F (36 6 °C)] 97 8 °F (36 6 °C)  HR:  [] 85  Resp:  [12-20] 18  BP: ()/(40-61) 109/53  SpO2:  [97 %-100 %] 100 %  Body mass index is 26 04 kg/m²  Input and Output Summary (last 24 hours): Intake/Output Summary (Last 24 hours) at 12/19/18 1420  Last data filed at 12/19/18 0130   Gross per 24 hour   Intake          3500 01 ml   Output                0 ml   Net          3500 01 ml       Physical Exam:     Physical Exam  General:  NAD, awake, alert, oriented x 3  HEENT:  NC/AT, mucous membranes moist  Neck:  Supple, No JVP elevation  CV:  + S1, + S2, RRR  Pulm:  Lung fields are CTA bilaterally  Abd:  Soft, Non-tender, Non-distended  Ext:  No clubbing/cyanosis/edema  Skin:  No rashes      Additional Data:    Labs:      Results from last 7 days  Lab Units 12/19/18  0513   WBC Thousand/uL 6 15   HEMOGLOBIN g/dL 11 0*   HEMATOCRIT % 33 2*   PLATELETS Thousands/uL 229   NEUTROS PCT % 52   LYMPHS PCT % 37   MONOS PCT % 8   EOS PCT % 2       Results from last 7 days  Lab Units 12/19/18  1217 12/19/18  0512   SODIUM mmol/L 137 140   POTASSIUM mmol/L 3 3* 2 9*   CHLORIDE mmol/L 109* 111*   CO2 mmol/L 17* 20*   BUN mg/dL 11 14   CREATININE mg/dL 0 82 0 81   ANION GAP mmol/L 11 9   CALCIUM mg/dL 7 3* 7 6*   ALBUMIN g/dL  --  2 8*   TOTAL BILIRUBIN mg/dL  --  0 20   ALK PHOS U/L  --  37*   ALT U/L  --  23   AST U/L  --  19   GLUCOSE RANDOM mg/dL 147* 85                   Results from last 7 days  Lab Units 12/19/18  0513 12/19/18  0512   LACTIC ACID mmol/L 0 5  --    PROCALCITONIN ng/ml  --  <0 05           * I Have Reviewed All Lab Data Listed Above  * Additional Pertinent Lab Tests Reviewed:  [address] Admission Reviewed      Recent Cultures (last 7 days):           Last 24 Hours Medication List:     Current Facility-Administered Medications:  acetaminophen 650 mg Oral Q6H PRN [employer], DO    amitriptyline 50 mg Oral HS [employer], DO    b complex-vitamin C-folic acid 1 capsule Oral Daily With Justin Martin, DO    enoxaparin 40 mg Subcutaneous Q24H [address] Papo Carr, DO    ferrous sulfate 325 mg Oral BID With Meals Shauna Kidd,     magnesium oxide 400 mg Oral Daily Shauna Kidd, DO    ondansetron 4 mg Intravenous Q4H PRN Shauna Kidd, DO    potassium chloride 40 mEq Oral 4x Daily Juan Carlos Varvarelis, DO    potassium chloride 20 mEq Intravenous Q2H Juan Carlos Foremanvargatos, DO Last Rate: 20 mEq (12/19/18 1319)   sodium bicarbonate infusion 75 mL/hr Intravenous Continuous Juan Carlos Varvarelis, DO    sodium bicarbonate 650 mg Oral BID Shauna Kidd, DO    spironolactone 50 mg Oral Daily Shauna Kidd, DO    thiamine 100 mg Oral Daily Shauna Kidd, DO    zinc sulfate 220 mg Oral Daily Shauna Kidd,          Today, Patient Was Seen By: Shauna Kidd DO    ** Please Note: Dictation voice to text software may have been used in the creation of this document   **

## 2018-12-19 NOTE — PLAN OF CARE
Problem: Potential for Falls  Goal: Patient will remain free of falls  INTERVENTIONS:  - Assess patient frequently for physical needs  -  Identify cognitive and physical deficits and behaviors that affect risk of falls    -  [place] fall precautions as indicated by assessment   - Educate patient/family on patient safety including physical limitations  - Instruct patient to call for assistance with activity based on assessment  - Modify environment to reduce risk of injury  - Consider OT/PT consult to assist with strengthening/mobility   Outcome: Progressing      Problem: PAIN - ADULT  Goal: Verbalizes/displays adequate comfort level or baseline comfort level  Interventions:  - Encourage patient to monitor pain and request assistance  - Assess pain using appropriate pain scale  - Administer analgesics based on type and severity of pain and evaluate response  - Implement non-pharmacological measures as appropriate and evaluate response  - Consider cultural and social influences on pain and pain management  - Notify physician/advanced practitioner if interventions unsuccessful or patient reports new pain  Outcome: Progressing      Problem: INFECTION - ADULT  Goal: Absence or prevention of progression during hospitalization  INTERVENTIONS:  - Assess and monitor for signs and symptoms of infection  - Monitor lab/diagnostic results  - Monitor all insertion sites, i e  indwelling lines, tubes, and drains  - Monitor endotracheal (as able) and nasal secretions for changes in amount and color  - [place] appropriate cooling/warming therapies per order  - Administer medications as ordered  - Instruct and encourage patient and family to use good hand hygiene technique  - Identify and instruct in appropriate isolation precautions for identified infection/condition  Outcome: Progressing      Problem: SAFETY ADULT  Goal: Maintain or return to baseline ADL function  INTERVENTIONS:  -  Assess patient's ability to carry out ADLs; assess patient's baseline for ADL function and identify physical deficits which impact ability to perform ADLs (bathing, care of mouth/teeth, toileting, grooming, dressing, etc )  - Assess/evaluate cause of self-care deficits   - Assess range of motion  - Assess patient's mobility; develop plan if impaired  - Assess patient's need for assistive devices and provide as appropriate  - Encourage maximum independence but intervene and supervise when necessary  ¯ Involve family in performance of ADLs  ¯ Assess for home care needs following discharge   ¯ Request OT consult to assist with ADL evaluation and planning for discharge  ¯ Provide patient education as appropriate  Outcome: Progressing    Goal: Maintain or return mobility status to optimal level  INTERVENTIONS:  - Assess patient's baseline mobility status (ambulation, transfers, stairs, etc )    - Identify cognitive and physical deficits and behaviors that affect mobility  - Identify mobility aids required to assist with transfers and/or ambulation (gait belt, sit-to-stand, lift, walker, cane, etc )  - [place] fall precautions as indicated by assessment  - Record patient progress and toleration of activity level on Mobility SBAR; progress patient to next Phase/Stage  - Instruct patient to call for assistance with activity based on assessment  - Request Rehabilitation consult to assist with strengthening/weightbearing, etc   Outcome: Progressing      Problem: DISCHARGE PLANNING  Goal: Discharge to home or other facility with appropriate resources  INTERVENTIONS:  - Identify barriers to discharge w/patient and caregiver  - Arrange for needed discharge resources and transportation as appropriate  - Identify discharge learning needs (meds, wound care, etc )  - Arrange for interpretive services to assist at discharge as needed  - Refer to Case Management Department for coordinating discharge planning if the patient needs post-hospital services based on physician/advanced practitioner order or complex needs related to functional status, cognitive ability, or social support system  Outcome: Progressing      Problem: Knowledge Deficit  Goal: Patient/family/caregiver demonstrates understanding of disease process, treatment plan, medications, and discharge instructions  Complete learning assessment and assess knowledge base    Interventions:  - Provide teaching at level of understanding  - Provide teaching via preferred learning methods  Outcome: Progressing      Problem: METABOLIC, FLUID AND ELECTROLYTES - ADULT  Goal: Electrolytes maintained within normal limits  INTERVENTIONS:  - Monitor labs and assess patient for signs and symptoms of electrolyte imbalances  - Administer electrolyte replacement as ordered  - Monitor response to electrolyte replacements, including repeat lab results as appropriate  - Instruct patient on fluid and nutrition as appropriate  Outcome: Progressing    Goal: Fluid balance maintained  INTERVENTIONS:  - Monitor labs and assess for signs and symptoms of volume excess or deficit  - Monitor I/O and WT  - Instruct patient on fluid and nutrition as appropriate  Outcome: Progressing

## 2018-12-19 NOTE — ASSESSMENT & PLAN NOTE
· Replete with PO and IV potassium chloride supplementation  · Consult Nephrology  · Follow the potassium level and magnesium level

## 2018-12-19 NOTE — UTILIZATION REVIEW
Initial Clinical Review  Admission: Date/Time/Statement: 12/18/18 @ 1521   Orders Placed This Encounter   Procedures    Inpatient Admission (expected length of stay for this patient is greater than two midnights)     Standing Status:   Standing     Number of Occurrences:   1     Order Specific Question:   Admitting Physician     Answer:   [place] [facility]     Order Specific Question:   Level of Care     Answer:   Med Surg [16]     Order Specific Question:   Estimated length of stay     Answer:   More than 2 Midnights     Order Specific Question:   Certification     Answer:   I certify that inpatient services are medically necessary for this patient for a duration of greater than two midnights  See H&P and MD Progress Notes for additional information about the patient's course of treatment  ED: Date/Time/Mode of Arrival:   ED Arrival Information     Expected Arrival Acuity Means of Arrival Escorted By Service Admission Type    - 12/18/2018 12:52 Urgent Walk-In Family Member Hospitalist Urgent    Arrival Complaint    Abnormal Labs      Chief Complaint:   Chief Complaint   Patient presents with    Abnormal Lab     Patient states that she had blood work done yesterday morning and recieved a phone call this afternoon stating her potassium was 3 1   History of Illness:   Patient presents to the emergency department today offering a chief complaint of low potassium levels  She states she had outpatient blood work yesterday which showed a potassium of 3 1  She has a long standing history of low potassium levels  Patient's nephrologist allegedly told her to come in  She takes 200 mEq of potassium daily    She admits to bilateral extremity numbness and tingling which is worse than normal  Has tingeing feeling in hands, feet up to knees, also started in face yesterday, feel like pins and needles  ED Vital Signs:   ED Triage Vitals [12/18/18 1300]   Temperature Pulse Respirations Blood Pressure SpO2   98 9 °F (37 2 °C) 95 18 127/74 96 %      Temp Source Heart Rate Source Patient Position - Orthostatic VS BP Location FiO2 (%)   Temporal Monitor Sitting Right arm --      Pain Score       No Pain        Wt Readings from Last 1 Encounters:   12/18/18 66 7 kg (147 lb)   Vital Signs (abnormal):    Abnormal Labs/Diagnostic Test Results:   K 2 2 GFR 57   EKG=Normal sinus rhythm  ED Treatment:   Medication Administration from 12/18/2018 1252 to 12/19/2018 1000       Date/Time Order Dose Route Action Action by Comments     12/18/2018 1523 sodium chloride 0 9 % bolus 1,000 mL 0 mL Intravenous Stopped Carolina Palm RN      12/18/2018 1400 sodium chloride 0 9 % bolus 1,000 mL 1,000 mL Intravenous New Bag Rick Sweeney, ELYSSA      12/18/2018 1739 potassium chloride 20 mEq IVPB (premix) 0 mEq Intravenous Stopped Carolina Palm RN      12/18/2018 1439 potassium chloride 20 mEq IVPB (premix) 40 mEq Intravenous New Bag Reid Robertson, RN 1st bag of 20mEq K+ infusing at this time     12/18/2018 1436 potassium chloride (K-DUR,KLOR-CON) CR tablet 40 mEq 40 mEq Oral Given Reid Robertson, ELYSSA      12/18/2018 1739 sodium chloride 0 9 % bolus 1,000 mL 0 mL Intravenous Stopped Carolina Palm RN      12/18/2018 1525 sodium chloride 0 9 % bolus 1,000 mL 1,000 mL Intravenous New Bag Carolina Palm RN      12/18/2018 1946 potassium chloride 20 mEq IVPB (premix) 0 mEq Intravenous Stopped Libertad Hernandez, ELYSSA      12/18/2018 1746 potassium chloride 20 mEq IVPB (premix) 20 mEq Intravenous New Bag Carolina Palm RN      12/18/2018 2035 sodium chloride 0 9 % bolus 1,000 mL 0 mL Intravenous Stopped Lui English, ELYSSA      12/18/2018 1751 sodium chloride 0 9 % bolus 1,000 mL 1,000 mL Intravenous New Bag Carolina Palm RN      12/19/2018 0342 potassium chloride 20 mEq IVPB (premix) 0 mEq Intravenous Stopped Manpreet Sewell, RN      12/19/2018 0142 potassium chloride 20 mEq IVPB (premix) 20 mEq Intravenous New Bag Annabelle Eason Prasanna, ELYSSA      12/19/2018 0130 potassium chloride 20 mEq IVPB (premix) 0 mEq Intravenous Stopped Dayron Miguel RN      12/18/2018 2330 potassium chloride 20 mEq IVPB (premix) 20 mEq Intravenous New Bag Dayron Miguel, RN      12/18/2018 2302 potassium chloride 20 mEq IVPB (premix) 0 mEq Intravenous Stopped Dayron Miguel, RN      12/18/2018 2102 potassium chloride 20 mEq IVPB (premix) 20 mEq Intravenous New Bag Dayron Miguel RN      12/18/2018 2102 sodium chloride 0 9 % infusion 100 mL/hr Intravenous New Bag Rosalinda Robbypat Prasanna, RN      12/19/2018 1000 enoxaparin (LOVENOX) subcutaneous injection 40 mg 40 mg Subcutaneous Not Given Yumiko German, RN      12/18/2018 2143 amitriptyline (ELAVIL) tablet 50 mg 50 mg Oral Given Dayron Miguel, ELYSSA      12/19/2018 4915 ferrous sulfate tablet 325 mg 325 mg Oral Given Yumiko German, RN      12/19/2018 0950 magnesium oxide (MAG-OX) tablet 400 mg 400 mg Oral Given Yumiko German, RN      12/19/2018 0950 sodium bicarbonate tablet 650 mg 650 mg Oral Given Yumiko German, RN      12/18/2018 2143 sodium bicarbonate tablet 650 mg 650 mg Oral Given Dayron Miguel, ELYSSA      12/19/2018 0950 thiamine (VITAMIN B1) tablet 100 mg 100 mg Oral Given Yumiko German, RN      12/19/2018 5834 zinc sulfate (ZINCATE) capsule 220 mg 220 mg Oral Given Yumiko German, RN      12/19/2018 0604 potassium chloride 20 mEq IVPB (premix) 20 mEq Intravenous Rate/Dose Change Kian Lopez RN IV site burning at initial ordered dose  Rate was reduced to 25 mL/hr and SLIM paged to inform  12/19/2018 0837 potassium chloride 20 mEq IVPB (premix) 20 mEq Intravenous [address] Kian Lopez, ELYSSA      12/19/2018 0834 potassium chloride (K-DUR,KLOR-CON) CR tablet 40 mEq 40 mEq Oral Given Kian Lopez, RN      12/19/2018 1583 spironolactone (ALDACTONE) tablet 50 mg 50 mg Oral Given Yumiko German RN       Past Medical/Surgical History:    Active Ambulatory Problems     Diagnosis Date Noted    Hypokalemia 07/07/2018    History of gastric bypass 07/07/2018    Migraine without aura and without status migrainosus, not intractable 07/07/2018    Left-sided weakness 07/07/2018    Hypophosphatemia 07/09/2018    Anemia 07/10/2018    Vitamin D deficiency 07/10/2018    Weakness of both lower extremities 07/11/2018    Weakness of both upper extremities 07/11/2018    Elevated CPK 07/11/2018    Vitamin B12 deficiency 07/11/2018    Cervical lymphadenopathy 07/11/2018    Generalized weakness 07/12/2018    Paresthesia of both lower extremities 08/15/2018    Paresthesias 08/15/2018    B12 deficiency 01/17/2016    Gastric bypass status for obesity 10/02/2018    GERD (gastroesophageal reflux disease) 09/03/2018    Migraine 05/29/2014    WAGNER (obstructive sleep apnea) 06/11/2014    Other intestinal malabsorption 10/05/2018     Resolved Ambulatory Problems     Diagnosis Date Noted    Non-traumatic rhabdomyolysis 07/07/2018    Transaminitis 07/07/2018    Hypocalcemia 07/09/2018    Hypernatremia 07/09/2018     Past Medical History:   Diagnosis Date    H  pylori infection     Hypokalemia     Migraine    Admitting Diagnosis: Abnormal laboratory test result [R89 9]  Age/Sex: 28 y o  female  Assessment/Plan:   27 YO FEMALE TO ER FROM HOME, SENT BY NEPHROLOGIST FOR LOW K @ 3 0  PRESENTS C/O FACIAL PARASTHESIA, DIFFICULTY CONCENTRATING, PALPITATIONS, LIGHTHEADEDNESS & UNINTENTIONAL WEIGHT LOSS @ APPROX 6-8#  ADMISSION LABS FOUND K@ 2 2  ADMITTED TO INPATIENT STATUS FOR HYPOKALEMIA, FAILING PO OUTPATIENT REPLETION TX     Admission Orders:  TELEMETRY  CONSULT RENAL  VENODYNES  Scheduled Meds:   Current Facility-Administered Medications:  acetaminophen 650 mg Oral Q6H PRN   amitriptyline 50 mg Oral HS   b complex-vitamin C-folic acid 1 capsule Oral Daily With Dinner   enoxaparin 40 mg Subcutaneous Q24H MIKE   ferrous sulfate 325 mg Oral BID With Meals   magnesium oxide 400 mg Oral Daily   ondansetron 4 mg Intravenous Q4H PRN   potassium chloride 40 mEq Oral 4x Daily   potassium chloride 20 mEq Intravenous Q2H   sodium bicarbonate 650 mg Oral BID   spironolactone 50 mg Oral Daily   thiamine 100 mg Oral Daily   zinc sulfate 220 mg Oral Daily   Continuous Infusions:   sodium chloride 100 mL/hr Last Rate: 100 mL/hr (12/18/18 2102)   PRN Meds:   acetaminophen    ondansetron  St  [address] [facility] [place] Review Department  Phone: [phone]; Fax [phone]  [email or web address]  ATTENTION: Please call with any questions or concerns to [phone]  and carefully listen to the prompts so that you are directed to the right person  Send all requests for admission clinical reviews, approved or denied determinations and any other requests to fax [phone]   All voicemails are confidential

## 2018-12-19 NOTE — SOCIAL WORK
Cm met with the patient to evaluate the patients prior function and living situation and any barriers to d/c and form a safe d/c plan  Cm also evaluated the patient for any services in the home or needs for services  Pt resides at home with her  in a single level house in [address]  Pt is independent with her adls and ambulation  No services or DME  Pt and spouse both drive  PCP is Michelle Lawler and pharmacy is [facility]  Pt with frequent Er and hospital admissions for hypokalemia  CM got pt approved with her insurance to come in on an outpatient basis to infusion for IV potassium based on her weekly lab draws  (Pt gets labs drawn on a Monday at [facility])  Pt got her lab work done on Monday and was set up for 8am today to get her infusion here but subsequently was admitted yesterday to the hospital)  CM will continue to follow and assist in dc planning

## 2018-12-19 NOTE — CONSULTS
Consultation - Nephrology   Arnold Bumpers 28 y o  female MRN: [id number]  Unit/Bed#: DT84 Encounter: [id number]      A/P:  1  Acute kidney injury   Patient's creatinine had increased up to 1 2 mg/dL for unknown causes at this time  Patient was given some volume expansion and her creatinine this morning is back to her typical baseline  Patient denies any nausea vomiting diarrhea, and denies any change in her dietary hand bowel regimen  However, she noted that her weight has been decreasing and was at its lowest just prior to presentation here at the hospital     2   Hypokalemia              Place the patient back on typical oral potassium supplementation in the outpatient setting on which is 40 mEq p o  4 times a day  In addition to this, or refer another 140 mEq IV  We will need to check a basic metabolic panel around 12 o'clock noon today and check another in the evening if indicated  Regarding further workup, I would defer at this time as the patient has had a full workup from what our institution here can provide  We will try to plan for the patient to seek further help at an academic institution who may be better equipped and suited to further evaluate the patient  I would expect for the patient to have another evaluation for not just the nephrologic standpoint but also from Gastroenterology and also potentially endocrinology  Patient is agreeable with this plan given that she continues to require inpatient potassium supplementation in order to adequately maintain appropriate potassium levels  We were able to arrange for the patient to have a potassium infusion in the outpatient setting which allowed her to stay in the outpatient setting for longer than typical   Patient has difficult access in her native veins and we are actively looking for the patient to have a MediPort placed to assist in future care  3  Volume depletion-resolved status post volume expansion   4    History of hypophosphatemia                phosphorus level at presentation was appropriate, continue monitor  Patient has been increasing dietary intake of dairy products in the outpatient setting  5  Hypomagnesemia              Continue with oral supplementation, magnesium level is appropriate at this time  6  Status post gastric bypass surgery  7  Acidosis   Bicarbonate decreased this morning status post volume expansion  Continue to monitor next 24 hours, a carbon supplementation as needed         Thank you for allowing us to participate in the care of your patient  Please feel free to contact us regarding the care of this patient, or any other questions/concerns that may be applicable  Patient Active Problem List   Diagnosis    Hypokalemia    History of gastric bypass    Migraine without aura and without status migrainosus, not intractable    Left-sided weakness    Hypophosphatemia    Anemia    Vitamin D deficiency    Weakness of both lower extremities    Weakness of both upper extremities    Elevated CPK    Vitamin B12 deficiency    Cervical lymphadenopathy    Generalized weakness    Paresthesia of both lower extremities    Paresthesias    B12 deficiency    Gastric bypass status for obesity    GERD (gastroesophageal reflux disease)    Migraine    WAGNER (obstructive sleep apnea)    Other intestinal malabsorption    Acute kidney injury ([address] )       History of Present Illness   Physician Requesting Consult: Kody Álvarez DO  Reason for Consult / Principal Problem:  Hypokalemia  Hx and PE limited by:   HPI: Divya Peterson is a 28y o  year old female who presented to the emergency department after our office contact her earlier on December 18th with a low potassium  At that time the patient was complaining of paresthesias if the upper and lower extremities as well as the perioral region which for her typically means a much lower potassium than the lab result showed    Potassium we got from lab noted a 3 1 millimole/L value, at the hospital the patient was noted to have potassium of 2 2 millimole/L  I communicated with the emergency room provider, as well as with the admitting physician yesterday in order to properly coordinate care  Patient got approximately 140 mEq from presentation to the hospital to this morning  Her potassium increased from the 2 2 value up to 2 9 millimole/L this morning  There are no other significant electrolyte abnormalities at this time, including an appropriate magnesium and phosphorus  Patient denies nausea vomiting or diarrhea, she has been taking all her medications as prescribed in fact had increased her potassium from 40 mEq 4 times a day up to 5 times a day for the last several days  Patient has been taking her spironolactone as prescribed, and she has also been taking sodium tablets in an effort to maintain volume status  From the renal standpoint, patient has no abnormal kidney function with a baseline creatinine around 0 8 mg/dL  At presentation based creatinine was 1 2 mg/dL  Other the patient denies any nausea vomiting or diarrhea and also denies any anorexia and claims to be in eating and drinking appropriately, she had noted that her weight was down approximately 4 or 5 lb in the last several days  The patient's prior history was reviewed, we have seen the patient multiple times in the inpatient and in the outpatient setting  She has had a full gastroenterology and renal workup in order to try to figure out the etiology of the patient's hypokalemia  Please refer above for further plans  History obtained from chart review and the patient    Review of Systems - Negative except as mentioned above in HPI, more specifics as mentioned below    Review of Systems - General ROS: positive for  - fatigue  Psychological ROS: positive for - anxiety  Ophthalmic ROS: negative  ENT ROS: negative  Allergy and Immunology ROS: negative  Hematological and Lymphatic ROS: negative  Endocrine ROS: negative  Respiratory ROS: no cough, shortness of breath, or wheezing  Cardiovascular ROS: no chest pain or dyspnea on exertion  Gastrointestinal ROS: no abdominal pain, change in bowel habits, or black or bloody stools  Genito-Urinary ROS: no dysuria, trouble voiding, or hematuria  Musculoskeletal ROS: negative  Neurological ROS: positive for - numbness/tingling  Dermatological ROS: negative    Historical Information   Past Medical History:   Diagnosis Date    H  pylori infection     Hypokalemia     Migraine      Past Surgical History:   Procedure Laterality Date    ABDOMINAL SURGERY      APPENDECTOMY      CHOLECYSTECTOMY      GASTRIC BYPASS      HYSTERECTOMY       Social History   History   Alcohol Use    Yes     Comment: socially     History   Drug Use No     History   Smoking Status    Never Smoker   Smokeless Tobacco    Never Used     Family History   Problem Relation Age of Onset    Hypertension Father     Diabetes Father     Diabetes Maternal Grandfather        Meds/Allergies   all current active meds have been reviewed, current meds: Current Facility-Administered Medications   Medication Dose Route Frequency    acetaminophen (TYLENOL) tablet 650 mg  650 mg Oral Q6H PRN    amitriptyline (ELAVIL) tablet 50 mg  50 mg Oral HS    b complex-vitamin C-folic acid (NEPHROCAPS) capsule 1 capsule  1 capsule Oral Daily With Dinner    enoxaparin (LOVENOX) subcutaneous injection 40 mg  40 mg Subcutaneous Q24H MIKE    ferrous sulfate tablet 325 mg  325 mg Oral BID With Meals    magnesium oxide (MAG-OX) tablet 400 mg  400 mg Oral Daily    ondansetron (ZOFRAN) injection 4 mg  4 mg Intravenous Q4H PRN    potassium chloride (K-DUR,KLOR-CON) CR tablet 40 mEq  40 mEq Oral 4x Daily    potassium chloride 20 mEq IVPB (premix)  20 mEq Intravenous Q2H    sodium bicarbonate tablet 650 mg  650 mg Oral BID    sodium chloride 0 9 % infusion  100 mL/hr Intravenous Continuous  spironolactone (ALDACTONE) tablet 50 mg  50 mg Oral Daily    thiamine (VITAMIN B1) tablet 100 mg  100 mg Oral Daily    zinc sulfate (ZINCATE) capsule 220 mg  220 mg Oral Daily    and PTA meds:    (Not in a hospital admission)      Allergies   Allergen Reactions    Nsaids      Other reaction(s): Other (Please comment)  Should not take s/p bariatric surgery       Objective     Intake/Output Summary (Last 24 hours) at 12/19/18 0823  Last data filed at 12/19/18 0130   Gross per 24 hour   Intake          3500 01 ml   Output                0 ml   Net          3500 01 ml       Invasive Devices:        Physical Exam      I/O last 3 completed shifts: In: 3500 [IV Piggyback:3500]  Out: -     Vitals:    12/19/18 0750   BP: 100/54   Pulse: 98   Resp: 20   Temp:    SpO2: 100%       Gen: in NAD, Alert/Awake  HEENT: no sclerous icterus, MMM, neck supple  CV: +S1/S2, RRR  Lungs: CTA bilaterally  Abd: +BS, soft NT/ND  Ext: all four extremities are warm  Skin: no rashes noted  Neuro: CN II-XII intact    Current Weight: Weight - Scale: 66 7 kg (147 lb)  First Weight: Weight - Scale: 66 7 kg (147 lb)    Lab Results:  I have personally reviewed pertinent labs      CBC: Lab Results   Component Value Date    WBC 6 15 12/19/2018    HGB 11 0 (L) 12/19/2018    HCT 33 2 (L) 12/19/2018    MCV 92 12/19/2018     12/19/2018    MCH 30 6 12/19/2018    MCHC 33 1 12/19/2018    RDW 14 9 12/19/2018    MPV 10 4 12/19/2018    NRBC 0 12/19/2018     CMP: Lab Results   Component Value Date    K 2 9 (L) 12/19/2018     (H) 12/19/2018    CO2 20 (L) 12/19/2018    BUN 14 12/19/2018    CREATININE 0 81 12/19/2018    CALCIUM 7 6 (L) 12/19/2018    AST 19 12/19/2018    ALT 23 12/19/2018    ALKPHOS 37 (L) 12/19/2018    EGFR 95 12/19/2018     Phosphorus:   Lab Results   Component Value Date    PHOS 3 2 12/19/2018     Magnesium:   Lab Results   Component Value Date    MG 2 0 12/19/2018     Urinalysis: No results found for: SUNG Chan, Pernell Garcia, NITRITE, PROTEINUA, GLUCOSEU, KETONESU, BILIRUBINUR, BLOODU  Ionized Calcium: No results found for: CAION  Coagulation: No results found for: PT, INR, APTT  Troponin:   Lab Results   Component Value Date    TROPONINI <0 02 12/19/2018     ABG: No results found for: PHART, CGU2PJN, PO2ART, HIK5OPR, Q9IYSUEX, BEART, SOURCE      Results from last 7 days  Lab Units 12/19/18  0512 12/18/18  2129 12/18/18  1349   POTASSIUM mmol/L 2 9* 3 0* 2 2*   CHLORIDE mmol/L 111* 109* 102   CO2 mmol/L 20* 21 24   BUN mg/dL 14 15 18   CREATININE mg/dL 0 81 0 95 1 23   CALCIUM mg/dL 7 6* 7 4* 8 6   ALK PHOS U/L 37*  --   --    ALT U/L 23  --   --    AST U/L 19  --   --        Radiology review:  No results found            Mariela Castillo DO

## 2018-12-19 NOTE — ED NOTES
IV Potassium cancelled after current dose finished , will draw BMP at 2100hrs per S Rosarioank ARIADNA Khalil RN  12/18/18 2003

## 2018-12-19 NOTE — ASSESSMENT & PLAN NOTE
· Replete with PO and IV potassium chloride supplementation per Nephrology's recommendation  · Follow the potassium level and magnesium level  · The patient will need a Mediport placement at some point for outpatient IV potassium chloride infusions

## 2018-12-19 NOTE — ASSESSMENT & PLAN NOTE
· Likely prerenal azotemia  · Resolved   · IV fluids per Nephrology  · Avoid all nephrotoxic agents  · Serial laboratory testing to monitor her renal function and electrolytes  · I appreciate Nephrology's input

## 2018-12-20 ENCOUNTER — ANESTHESIA EVENT (INPATIENT)
Dept: PERIOP | Facility: HOSPITAL | Age: 33
DRG: 422 | End: 2018-12-20
Payer: COMMERCIAL

## 2018-12-20 LAB
ALBUMIN SERPL BCP-MCNC: 2.6 G/DL (ref 3.5–5)
ALP SERPL-CCNC: 31 U/L (ref 46–116)
ALT SERPL W P-5'-P-CCNC: 22 U/L (ref 12–78)
ANION GAP SERPL CALCULATED.3IONS-SCNC: 9 MMOL/L (ref 4–13)
AST SERPL W P-5'-P-CCNC: 15 U/L (ref 5–45)
BASOPHILS # BLD AUTO: 0.04 THOUSANDS/ΜL (ref 0–0.1)
BASOPHILS NFR BLD AUTO: 1 % (ref 0–1)
BILIRUB SERPL-MCNC: 0.3 MG/DL (ref 0.2–1)
BUN SERPL-MCNC: 11 MG/DL (ref 5–25)
CALCIUM SERPL-MCNC: 7.3 MG/DL (ref 8.3–10.1)
CHLORIDE SERPL-SCNC: 110 MMOL/L (ref 100–108)
CO2 SERPL-SCNC: 21 MMOL/L (ref 21–32)
CREAT SERPL-MCNC: 0.74 MG/DL (ref 0.6–1.3)
EOSINOPHIL # BLD AUTO: 0.15 THOUSAND/ΜL (ref 0–0.61)
EOSINOPHIL NFR BLD AUTO: 3 % (ref 0–6)
ERYTHROCYTE [DISTWIDTH] IN BLOOD BY AUTOMATED COUNT: 15.2 % (ref 11.6–15.1)
GFR SERPL CREATININE-BSD FRML MDRD: 106 ML/MIN/1.73SQ M
GLUCOSE SERPL-MCNC: 81 MG/DL (ref 65–140)
HCT VFR BLD AUTO: 32.4 % (ref 34.8–46.1)
HGB BLD-MCNC: 10.6 G/DL (ref 11.5–15.4)
IMM GRANULOCYTES # BLD AUTO: 0.01 THOUSAND/UL (ref 0–0.2)
IMM GRANULOCYTES NFR BLD AUTO: 0 % (ref 0–2)
LYMPHOCYTES # BLD AUTO: 2.53 THOUSANDS/ΜL (ref 0.6–4.47)
LYMPHOCYTES NFR BLD AUTO: 53 % (ref 14–44)
MAGNESIUM SERPL-MCNC: 1.9 MG/DL (ref 1.6–2.6)
MCH RBC QN AUTO: 31.2 PG (ref 26.8–34.3)
MCHC RBC AUTO-ENTMCNC: 32.7 G/DL (ref 31.4–37.4)
MCV RBC AUTO: 95 FL (ref 82–98)
MONOCYTES # BLD AUTO: 0.37 THOUSAND/ΜL (ref 0.17–1.22)
MONOCYTES NFR BLD AUTO: 8 % (ref 4–12)
NEUTROPHILS # BLD AUTO: 1.66 THOUSANDS/ΜL (ref 1.85–7.62)
NEUTS SEG NFR BLD AUTO: 35 % (ref 43–75)
NRBC BLD AUTO-RTO: 0 /100 WBCS
PHOSPHATE SERPL-MCNC: 2.2 MG/DL (ref 2.7–4.5)
PLATELET # BLD AUTO: 224 THOUSANDS/UL (ref 149–390)
PMV BLD AUTO: 10.4 FL (ref 8.9–12.7)
POTASSIUM SERPL-SCNC: 3.2 MMOL/L (ref 3.5–5.3)
PROCALCITONIN SERPL-MCNC: <0.05 NG/ML
PROT SERPL-MCNC: 5.1 G/DL (ref 6.4–8.2)
RBC # BLD AUTO: 3.4 MILLION/UL (ref 3.81–5.12)
SODIUM SERPL-SCNC: 140 MMOL/L (ref 136–145)
TROPONIN I SERPL-MCNC: <0.02 NG/ML
WBC # BLD AUTO: 4.76 THOUSAND/UL (ref 4.31–10.16)

## 2018-12-20 PROCEDURE — 84145 PROCALCITONIN (PCT): CPT | Performed by: INTERNAL MEDICINE

## 2018-12-20 PROCEDURE — 84484 ASSAY OF TROPONIN QUANT: CPT | Performed by: INTERNAL MEDICINE

## 2018-12-20 PROCEDURE — 99232 SBSQ HOSP IP/OBS MODERATE 35: CPT | Performed by: INTERNAL MEDICINE

## 2018-12-20 PROCEDURE — 83735 ASSAY OF MAGNESIUM: CPT | Performed by: INTERNAL MEDICINE

## 2018-12-20 PROCEDURE — 80053 COMPREHEN METABOLIC PANEL: CPT | Performed by: INTERNAL MEDICINE

## 2018-12-20 PROCEDURE — 84100 ASSAY OF PHOSPHORUS: CPT | Performed by: INTERNAL MEDICINE

## 2018-12-20 PROCEDURE — 99223 1ST HOSP IP/OBS HIGH 75: CPT | Performed by: SURGERY

## 2018-12-20 PROCEDURE — 85025 COMPLETE CBC W/AUTO DIFF WBC: CPT | Performed by: INTERNAL MEDICINE

## 2018-12-20 RX ORDER — SODIUM CHLORIDE AND POTASSIUM CHLORIDE .9; .15 G/100ML; G/100ML
100 SOLUTION INTRAVENOUS CONTINUOUS
Status: DISCONTINUED | OUTPATIENT
Start: 2018-12-20 | End: 2018-12-21 | Stop reason: HOSPADM

## 2018-12-20 RX ORDER — POTASSIUM CHLORIDE 20MEQ/15ML
40 LIQUID (ML) ORAL 4 TIMES DAILY
Status: DISCONTINUED | OUTPATIENT
Start: 2018-12-20 | End: 2018-12-21 | Stop reason: HOSPADM

## 2018-12-20 RX ORDER — POTASSIUM CHLORIDE 14.9 MG/ML
20 INJECTION INTRAVENOUS
Status: DISCONTINUED | OUTPATIENT
Start: 2018-12-20 | End: 2018-12-20

## 2018-12-20 RX ORDER — POTASSIUM CHLORIDE 14.9 MG/ML
20 INJECTION INTRAVENOUS
Status: COMPLETED | OUTPATIENT
Start: 2018-12-20 | End: 2018-12-21

## 2018-12-20 RX ADMIN — FERROUS SULFATE TAB 325 MG (65 MG ELEMENTAL FE) 325 MG: 325 (65 FE) TAB at 16:08

## 2018-12-20 RX ADMIN — Medication 1 CAPSULE: at 16:08

## 2018-12-20 RX ADMIN — POTASSIUM CHLORIDE 20 MEQ: 200 INJECTION, SOLUTION INTRAVENOUS at 00:20

## 2018-12-20 RX ADMIN — SODIUM BICARBONATE 75 ML/HR: 84 INJECTION, SOLUTION INTRAVENOUS at 04:41

## 2018-12-20 RX ADMIN — MAGNESIUM OXIDE TAB 400 MG (241.3 MG ELEMENTAL MG) 400 MG: 400 (241.3 MG) TAB at 08:58

## 2018-12-20 RX ADMIN — SODIUM BICARBONATE 650 MG: 650 TABLET ORAL at 17:43

## 2018-12-20 RX ADMIN — POTASSIUM CHLORIDE 40 MEQ: 20 SOLUTION ORAL at 17:43

## 2018-12-20 RX ADMIN — SODIUM BICARBONATE 650 MG: 650 TABLET ORAL at 08:57

## 2018-12-20 RX ADMIN — Medication 220 MG: at 08:57

## 2018-12-20 RX ADMIN — FERROUS SULFATE TAB 325 MG (65 MG ELEMENTAL FE) 325 MG: 325 (65 FE) TAB at 08:57

## 2018-12-20 RX ADMIN — SODIUM CHLORIDE AND POTASSIUM CHLORIDE 100 ML/HR: .9; .15 SOLUTION INTRAVENOUS at 13:50

## 2018-12-20 RX ADMIN — POTASSIUM CHLORIDE 40 MEQ: 20 SOLUTION ORAL at 12:17

## 2018-12-20 RX ADMIN — Medication 100 MG: at 08:57

## 2018-12-20 RX ADMIN — SODIUM CHLORIDE AND POTASSIUM CHLORIDE 100 ML/HR: .9; .15 SOLUTION INTRAVENOUS at 23:53

## 2018-12-20 RX ADMIN — POTASSIUM CHLORIDE 20 MEQ: 200 INJECTION, SOLUTION INTRAVENOUS at 23:55

## 2018-12-20 RX ADMIN — POTASSIUM PHOSPHATE, MONOBASIC AND POTASSIUM PHOSPHATE, DIBASIC 12 MMOL: 224; 236 INJECTION, SOLUTION INTRAVENOUS at 08:58

## 2018-12-20 RX ADMIN — POTASSIUM CHLORIDE 40 MEQ: 20 SOLUTION ORAL at 08:58

## 2018-12-20 RX ADMIN — POTASSIUM CHLORIDE 20 MEQ: 200 INJECTION, SOLUTION INTRAVENOUS at 17:43

## 2018-12-20 RX ADMIN — POTASSIUM CHLORIDE 20 MEQ: 200 INJECTION, SOLUTION INTRAVENOUS at 13:51

## 2018-12-20 RX ADMIN — POTASSIUM CHLORIDE 40 MEQ: 20 SOLUTION ORAL at 21:31

## 2018-12-20 RX ADMIN — SPIRONOLACTONE 50 MG: 25 TABLET ORAL at 08:58

## 2018-12-20 RX ADMIN — POTASSIUM CHLORIDE 20 MEQ: 200 INJECTION, SOLUTION INTRAVENOUS at 21:35

## 2018-12-20 RX ADMIN — AMITRIPTYLINE HYDROCHLORIDE 50 MG: 50 TABLET, FILM COATED ORAL at 21:32

## 2018-12-20 RX ADMIN — POTASSIUM CHLORIDE 20 MEQ: 200 INJECTION, SOLUTION INTRAVENOUS at 19:44

## 2018-12-20 NOTE — ASSESSMENT & PLAN NOTE
· Persistent hypokalemia  · Continue repletion with PO and IV potassium chloride supplementation per Nephrology's recommendation  · Follow the potassium level and magnesium level  · The patient will need a Mediport placement at some point for outpatient IV potassium chloride infusions

## 2018-12-20 NOTE — UTILIZATION REVIEW
PHYSICIAN ADVISOR  PROGRESS NOTE  12/20    FOR  RECONSIDERATION    The patient is a 60-year-old female who presented to the hospital on December 18, 2018  She was admitted as an inpatient  She had an observation stay from November 26th until November 29, 2018 for hypokalemia  She was evaluated by Nephrology during this stay and multiple diagnoses were considered  She underwent comprehensive evaluation  The medical history includes gastric bypass  She was discharged on a treatment regimen and to obtain close outpatient follow-up      This time, she presented with lower extremity paresthesias, facial paresthesias, difficulty concentrating, and generalized weakness  Creatinine was higher than baseline 1 23 compared to 0 7-0 8  The symptoms were concerning for severe hypokalemia given her history  Outpatient labs on December 17th showed potassium 3 1 but when checked on the day of arrival it was 2 2  Her last outpatient infusion of potassium chloride 40 mEq was on December 13th      On the initial day she received at least 3 L of sodium chloride, 100 mEq of intravenous potassium chloride between four doses, and 40 mg of oral potassium  On December 19th her potassium ranged from 2 9 to 3 3  She was again evaluated by Nephrology  She received eight doses of intravenous potassium chloride, each 20 mEq  This was in addition to 40 mEq by mouth x 4 doses  She required intravenous fluids with sodium bicarbonate because of metabolic acidosis  Blood pressures include 81/40 and 89/51      The patient may go on to require additional consultation at an academic institution  It was also recommended that she have full evaluation by Gastroenterology and possibly endocrinology  It was noted that the patient will require MediPort placement as she has difficult access in her native veins for outpatient potassium infusions      Today December 20th, the patient is not yet stable for discharge   Phosphorus is 2 2 and potassium 3 2  She remains on intravenous fluids with supplemental potassium at 100 cc/hour, now NSS  She received 12 millimoles of potassium phosphate, and two doses of potassium chloride intravenous so far  This is in addition to 40 mEq 4 times daily by mouth  Oral dosing will be in liquid form for better absorption  Paresthesias are improving but still present  Blood pressure is low normal at times- 97/48 and 97/47  Today, she will continue to receive potassium supplementation along with serial laboratory monitoring      This will be sent as a reconsideration request given the inpatient denial   If needed, peer to peer will be completed   [id number] [facility] Utilization Review Department  Phone: [phone]; Fax [phone]  [email or web address]  ATTENTION: Please call with any questions or concerns to [phone]  and carefully listen to the prompts so that you are directed to the right person  Send all requests for admission clinical reviews, approved or denied determinations and any other requests to fax [phone]   All voicemails are confidential

## 2018-12-20 NOTE — PROGRESS NOTES
Progress Note - Nephrology   Corbin Jackson 28 y o  female MRN: [id number]  Unit/Bed#: 413-01 Encounter: [id number]    A/P:  1  Acute kidney injury              Patient's creatinine had increased up to 1 2 mg/dL for unknown causes at this time  Patient was given some volume expansion and her creatinine this morning is back to her typical baseline  Patient denies any nausea vomiting diarrhea, and denies any change in her dietary hand bowel regimen  However, she noted that her weight has been decreasing and was at its lowest just prior to presentation here at the hospital     2   Hypokalemia              Patient's home oral potassium supplementation in the form of liquid due to absorption issues on tablet, unfortunately we had her on the tablets yesterday and I changed it this morning over to the liquid potassium  Will continue with supplementation throughout the day remaining on 160 mEq p o  Liquid potassium on top of IV supplementation  3  Volume depletion-resolved status post volume expansion   4  Hypophosphatemia                phosphorus supplemented with IV K-Phos  5  Hypomagnesemia              Continue with oral supplementation, magnesium level is appropriate at this time  6  Status post gastric bypass surgery  7  Acidosis              Bicarbonate better this morning, will discontinue the patient's half-normal saline with 75 mEq/L of sodium bicarbonate a little later this morning          Follow up reason for today's visit:  Acute kidney injury/hypokalemia/electrolyte disorders    Hypokalemia    Patient Active Problem List   Diagnosis    Hypokalemia    History of gastric bypass    Migraine without aura and without status migrainosus, not intractable    Left-sided weakness    Hypophosphatemia    Anemia    Vitamin D deficiency    Weakness of both lower extremities    Weakness of both upper extremities    Elevated CPK    Vitamin B12 deficiency    Cervical lymphadenopathy    Generalized weakness    Paresthesia of both lower extremities    Paresthesias    B12 deficiency    Gastric bypass status for obesity    GERD (gastroesophageal reflux disease)    Migraine    WAGNER (obstructive sleep apnea)    Other intestinal malabsorption    Acute kidney injury ([facility] [address] )         Subjective:   Denies paresthesias, feels better overall  Objective:     Vitals: Blood pressure 102/50, pulse 70, temperature (!) 97 2 °F (36 2 °C), temperature source Temporal, resp  rate 16, height 5' 3" (1 6 m), weight 70 4 kg (155 lb 3 3 oz), SpO2 99 %  ,Body mass index is 27 49 kg/m²  Weight (last 2 days)     Date/Time   Weight    12/20/18 0600  70 4 (155 2)    12/18/18 1300  66 7 (147)                Intake/Output Summary (Last 24 hours) at 12/20/18 0842  Last data filed at 12/20/18 0528   Gross per 24 hour   Intake                0 ml   Output              200 ml   Net             -200 ml     I/O last 3 completed shifts:   In: 1300 [IV Piggyback:1300]  Out: 200 [Urine:200]         Physical Exam: /50 (BP Location: Right arm)   Pulse 70   Temp (!) 97 2 °F (36 2 °C) (Temporal)   Resp 16   Ht 5' 3" (1 6 m)   Wt 70 4 kg (155 lb 3 3 oz)   SpO2 99%   BMI 27 49 kg/m²     General Appearance:    Alert, cooperative, no distress, appears stated age   Head:    Normocephalic, without obvious abnormality, atraumatic   Eyes:    Conjunctiva/corneas clear   Ears:    Normal external ears   Nose:   Nares normal, septum midline, mucosa normal, no drainage    or sinus tenderness   Throat:   Lips, mucosa, and tongue normal; teeth and gums normal   Neck:   Supple   Back:     Symmetric, no curvature, ROM normal, no CVA tenderness   Lungs:     Clear to auscultation bilaterally, respirations unlabored   Chest wall:    No tenderness or deformity   Heart:    Regular rate and rhythm, S1 and S2 normal, no murmur, rub   or gallop   Abdomen:     Soft, non-tender, bowel sounds active   Extremities:   Extremities normal, atraumatic, no cyanosis or edema   Skin:   Skin color, texture, turgor normal, no rashes or lesions   Lymph nodes:   Cervical normal   Neurologic:   CNII-XII intact            Lab, Imaging and other studies: I have personally reviewed pertinent labs  CBC: Lab Results   Component Value Date    WBC 4 76 12/20/2018    HGB 10 6 (L) 12/20/2018    HCT 32 4 (L) 12/20/2018    MCV 95 12/20/2018     12/20/2018    MCH 31 2 12/20/2018    MCHC 32 7 12/20/2018    RDW 15 2 (H) 12/20/2018    MPV 10 4 12/20/2018    NRBC 0 12/20/2018     CMP: Lab Results   Component Value Date    K 3 2 (L) 12/20/2018     (H) 12/20/2018    CO2 21 12/20/2018    BUN 11 12/20/2018    CREATININE 0 74 12/20/2018    CALCIUM 7 3 (L) 12/20/2018    AST 15 12/20/2018    ALT 22 12/20/2018    ALKPHOS 31 (L) 12/20/2018    EGFR 106 12/20/2018         Results from last 7 days  Lab Units 12/20/18  0519 12/19/18  1217 12/19/18  0512   POTASSIUM mmol/L 3 2* 3 3* 2 9*   CHLORIDE mmol/L 110* 109* 111*   CO2 mmol/L 21 17* 20*   BUN mg/dL 11 11 14   CREATININE mg/dL 0 74 0 82 0 81   CALCIUM mg/dL 7 3* 7 3* 7 6*   ALK PHOS U/L 31*  --  37*   ALT U/L 22  --  23   AST U/L 15  --  19         Phosphorus:   Lab Results   Component Value Date    PHOS 2 2 (L) 12/20/2018     Magnesium:   Lab Results   Component Value Date    MG 1 9 12/20/2018     Urinalysis: No results found for: COLORU, CLARITYU, SPECGRAV, PHUR, LEUKOCYTESUR, NITRITE, PROTEINUA, GLUCOSEU, KETONESU, BILIRUBINUR, BLOODU  Ionized Calcium: No results found for: CAION  Coagulation: No results found for: PT, INR, APTT  Troponin:   Lab Results   Component Value Date    TROPONINI <0 02 12/20/2018     ABG: No results found for: PHART, VFD3XCH, PO2ART, FFT4LQP, L7VUTADP, BEART, SOURCE  Radiology review:     IMAGING  No results found      Current Facility-Administered Medications   Medication Dose Route Frequency    acetaminophen (TYLENOL) tablet 650 mg  650 mg Oral Q6H PRN    amitriptyline (ELAVIL) tablet 50 mg  50 mg Oral HS    b complex-vitamin C-folic acid (NEPHROCAPS) capsule 1 capsule  1 capsule Oral Daily With Dinner    enoxaparin (LOVENOX) subcutaneous injection 40 mg  40 mg Subcutaneous Q24H MIKE    ferrous sulfate tablet 325 mg  325 mg Oral BID With Meals    magnesium oxide (MAG-OX) tablet 400 mg  400 mg Oral Daily    ondansetron (ZOFRAN) injection 4 mg  4 mg Intravenous Q4H PRN    potassium chloride 10 % oral solution 40 mEq  40 mEq Oral 4x Daily    potassium chloride 20 mEq IVPB (premix)  20 mEq Intravenous Q2H    potassium phosphate 12 mmol in sodium chloride 0 9 % 250 mL infusion  12 mmol Intravenous Once    sodium bicarbonate 75 mEq in sodium chloride 0 45 % 1,000 mL infusion  75 mL/hr Intravenous Continuous    sodium bicarbonate tablet 650 mg  650 mg Oral BID    spironolactone (ALDACTONE) tablet 50 mg  50 mg Oral Daily    thiamine (VITAMIN B1) tablet 100 mg  100 mg Oral Daily    zinc sulfate (ZINCATE) capsule 220 mg  220 mg Oral Daily     Medications Discontinued During This Encounter   Medication Reason    sodium chloride 0 9 % bolus 250 mL     potassium chloride 20 mEq IVPB (premix)     potassium chloride 20 mEq IVPB (premix)     spironolactone (ALDACTONE) tablet 50 mg     sodium chloride 0 9 % infusion     potassium chloride 20 mEq IVPB (premix)     potassium chloride (K-DUR,KLOR-CON) CR tablet 40 mEq        Nahum Austin, DO

## 2018-12-20 NOTE — PHYSICIAN ADVISOR
The patient is a 67-year-old female who presented to the hospital on December 18, 2018  She was admitted as an inpatient  She had an observation stay from November 26th until November 29, 2018 for hypokalemia  She was evaluated by Nephrology during this stay and multiple diagnoses were considered  She underwent comprehensive evaluation  The medical history includes gastric bypass  She was discharged on a treatment regimen and to obtain close outpatient follow-up  This time, she presented with lower extremity paresthesias, facial paresthesias, difficulty concentrating, and generalized weakness  Creatinine was higher than baseline 1 23 compared to 0 7-0 8  The symptoms were concerning for severe hypokalemia given her history  Outpatient labs on December 17th showed potassium 3 1 but when checked on the day of arrival it was 2 2  Her last outpatient infusion of potassium chloride 40 mEq was on December 13th  On the initial day she received at least 3 L of sodium chloride, 100 mEq of intravenous potassium chloride between four doses, and 40 mg of oral potassium  On December 19th her potassium ranged from 2 9 to 3 3  She was again evaluated by Nephrology  She received eight doses of intravenous potassium chloride, each 20 mEq  This was in addition to 40 mEq by mouth x 4 doses  She required intravenous fluids with sodium bicarbonate because of metabolic acidosis  Blood pressures include 81/40 and 89/51  The patient may go on to require additional consultation at an academic institution  It was also recommended that she have full evaluation by Gastroenterology and possibly endocrinology  It was noted that the patient will require MediPort placement as she has difficult access in her native veins for outpatient potassium infusions  Today December 20th, the patient is not yet stable for discharge  Phosphorus is 2 2 and potassium 3 2    She remains on intravenous fluids with supplemental potassium at 100 cc/hour, now NSS  She received 12 millimoles of potassium phosphate, and two doses of potassium chloride intravenous so far  This is in addition to 40 mEq 4 times daily by mouth  Oral dosing will be in liquid form for better absorption  Paresthesias are improving but still present  Blood pressure is low normal at times- 97/48 and 97/47  Today, she will continue to receive potassium supplementation along with serial laboratory monitoring  This will be sent as a reconsideration request given the inpatient denial   If needed, peer to peer will be completed

## 2018-12-20 NOTE — PROGRESS NOTES
Progress Note - Henry Muniz 1985, 28 y o  female MRN: [id number]    Unit/Bed#: 413-01 Encounter: [id number]    Primary Care Provider: ARIADNA Helms   Date and time admitted to hospital: 12/18/2018  1:09 PM        * Hypokalemia   Assessment & Plan    · Persistent hypokalemia  · Continue repletion with PO and IV potassium chloride supplementation per Nephrology's recommendation  · Follow the potassium level and magnesium level  · The patient will need a Mediport placement at some point for outpatient IV potassium chloride infusions     Acute kidney injury ([facility] [address] )   Assessment & Plan    · Likely prerenal azotemia  · Resolved   · IV fluids per Nephrology  · Avoid all nephrotoxic agents  · Serial laboratory testing to monitor her renal function and electrolytes  · I appreciate Nephrology's input     WAGNER (obstructive sleep apnea)   Assessment & Plan    · Outpatient sleep study     Vitamin D deficiency   Assessment & Plan    · Continue vitamin D supplementation         VTE Pharmacologic Prophylaxis:   Pharmacologic: Enoxaparin (Lovenox)  Mechanical VTE Prophylaxis in Place: Yes     Discussions with Specialists or Other Care Team Provider:  I discussed the case with Dr Adolph Lynn (Nephrology)  Time Spent for Care: 20 minutes  More than 50% of total time spent on counseling and coordination of care as described above  Current Length of Stay: 2 day(s)    Current Patient Status: Inpatient   Certification Statement: The patient will continue to require additional inpatient hospital stay due to the need for potassium chloride supplementation and the need for serial laboratory testing to monitor the potassium level  Code Status: Level 1 - Full Code      Subjective: The patient was seen and examined  The patient is doing better  She only complains of mild paresthesias in her bilateral feet  No chest pain  No palpitations  No shortness of breath      Objective:     Vitals:   Temp (24hrs), Av 9 °F (36 6 °C), Min:97 1 °F (36 2 °C), Max:99 2 °F (37 3 °C)    Temp:  [97 1 °F (36 2 °C)-99 2 °F (37 3 °C)] 98 3 °F (36 8 °C)  HR:  [70-93] 93  Resp:  [12-18] 12  BP: ()/(47-61) 115/56  SpO2:  [98 %-100 %] 100 %  Body mass index is 27 49 kg/m²  Input and Output Summary (last 24 hours): Intake/Output Summary (Last 24 hours) at 18 1145  Last data filed at 18 0528   Gross per 24 hour   Intake                0 ml   Output              200 ml   Net             -200 ml       Physical Exam:     Physical Exam  General:  NAD, awake, alert, oriented x 3  HEENT:  NC/AT, mucous membranes moist  Neck:  Supple, No JVP elevation  CV:  + S1, + S2, RRR  Pulm:  Lung fields are CTA bilaterally  Abd:  Soft, Non-tender, Non-distended  Ext:  No clubbing/cyanosis/edema  Skin:  No rashes      Additional Data:     Labs:      Results from last 7 days  Lab Units 18  0519   WBC Thousand/uL 4 76   HEMOGLOBIN g/dL 10 6*   HEMATOCRIT % 32 4*   PLATELETS Thousands/uL 224   NEUTROS PCT % 35*   LYMPHS PCT % 53*   MONOS PCT % 8   EOS PCT % 3       Results from last 7 days  Lab Units 18  0519   SODIUM mmol/L 140   POTASSIUM mmol/L 3 2*   CHLORIDE mmol/L 110*   CO2 mmol/L 21   BUN mg/dL 11   CREATININE mg/dL 0 74   ANION GAP mmol/L 9   CALCIUM mg/dL 7 3*   ALBUMIN g/dL 2 6*   TOTAL BILIRUBIN mg/dL 0 30   ALK PHOS U/L 31*   ALT U/L 22   AST U/L 15   GLUCOSE RANDOM mg/dL 81                   Results from last 7 days  Lab Units 18  0513 18  0512   LACTIC ACID mmol/L 0 5  --    PROCALCITONIN ng/ml  --  <0 05           * I Have Reviewed All Lab Data Listed Above  * Additional Pertinent Lab Tests Reviewed:  [address] Admission Reviewed        Recent Cultures (last 7 days):           Last 24 Hours Medication List:     Current Facility-Administered Medications:  acetaminophen 650 mg Oral Q6H PRN Kadeem Points, DO    amitriptyline 50 mg Oral HS Kadeem Points, DO    b complex-vitamin C-folic acid 1 capsule Oral Daily With Justin & Mario, DO    enoxaparin 40 mg Subcutaneous Q24H [facility] & half-way [place], DO    ferrous sulfate 325 mg Oral BID With Meals Sabrina Cola, DO    magnesium oxide 400 mg Oral Daily Sabrina Cola, DO    ondansetron 4 mg Intravenous Q4H PRN Sabrina Cola, DO    potassium chloride 40 mEq Oral 4x Daily Juan Carlos Varvarelis, DO    potassium chloride 20 mEq Intravenous Q2H Juan Carlos Varvarelis, DO    potassium phosphate 12 mmol Intravenous Once Sabrina Cola, DO Last Rate: 12 mmol (12/20/18 0858)   sodium bicarbonate infusion 75 mL/hr Intravenous Continuous Khushi Garcia MD Last Rate: 75 mL/hr (12/20/18 0441)   sodium bicarbonate 650 mg Oral BID Sabrina Cola, DO    spironolactone 50 mg Oral Daily Sabrina Cola, DO    thiamine 100 mg Oral Daily Sabrina Cola, DO    zinc sulfate 220 mg Oral Daily Sabrina Cola, DO         Today, Patient Was Seen By: Sabrina Keating DO    ** Please Note: Dictation voice to text software may have been used in the creation of this document   **

## 2018-12-21 ENCOUNTER — ANESTHESIA (INPATIENT)
Dept: PERIOP | Facility: HOSPITAL | Age: 33
DRG: 422 | End: 2018-12-21
Payer: COMMERCIAL

## 2018-12-21 ENCOUNTER — APPOINTMENT (INPATIENT)
Dept: RADIOLOGY | Facility: HOSPITAL | Age: 33
DRG: 422 | End: 2018-12-21
Payer: COMMERCIAL

## 2018-12-21 VITALS
HEART RATE: 95 BPM | BODY MASS INDEX: 27.5 KG/M2 | OXYGEN SATURATION: 99 % | SYSTOLIC BLOOD PRESSURE: 102 MMHG | DIASTOLIC BLOOD PRESSURE: 51 MMHG | RESPIRATION RATE: 18 BRPM | WEIGHT: 155.2 LBS | HEIGHT: 63 IN | TEMPERATURE: 97 F

## 2018-12-21 LAB
ANION GAP SERPL CALCULATED.3IONS-SCNC: 5 MMOL/L (ref 4–13)
BUN SERPL-MCNC: 6 MG/DL (ref 5–25)
CA-I BLD-SCNC: 1.18 MMOL/L (ref 1.12–1.32)
CALCIUM SERPL-MCNC: 7.5 MG/DL (ref 8.3–10.1)
CHLORIDE SERPL-SCNC: 111 MMOL/L (ref 100–108)
CO2 SERPL-SCNC: 24 MMOL/L (ref 21–32)
CREAT SERPL-MCNC: 0.74 MG/DL (ref 0.6–1.3)
ERYTHROCYTE [DISTWIDTH] IN BLOOD BY AUTOMATED COUNT: 15.2 % (ref 11.6–15.1)
GFR SERPL CREATININE-BSD FRML MDRD: 106 ML/MIN/1.73SQ M
GLUCOSE SERPL-MCNC: 80 MG/DL (ref 65–140)
HCT VFR BLD AUTO: 35.4 % (ref 34.8–46.1)
HGB BLD-MCNC: 11 G/DL (ref 11.5–15.4)
MAGNESIUM SERPL-MCNC: 2 MG/DL (ref 1.6–2.6)
MCH RBC QN AUTO: 30.5 PG (ref 26.8–34.3)
MCHC RBC AUTO-ENTMCNC: 31.1 G/DL (ref 31.4–37.4)
MCV RBC AUTO: 98 FL (ref 82–98)
PHOSPHATE SERPL-MCNC: 2.5 MG/DL (ref 2.7–4.5)
PLATELET # BLD AUTO: 242 THOUSANDS/UL (ref 149–390)
PMV BLD AUTO: 11 FL (ref 8.9–12.7)
POTASSIUM SERPL-SCNC: 3.9 MMOL/L (ref 3.5–5.3)
RBC # BLD AUTO: 3.61 MILLION/UL (ref 3.81–5.12)
SODIUM SERPL-SCNC: 140 MMOL/L (ref 136–145)
WBC # BLD AUTO: 5.24 THOUSAND/UL (ref 4.31–10.16)

## 2018-12-21 PROCEDURE — 02HV33Z INSERTION OF INFUSION DEVICE INTO SUPERIOR VENA CAVA, PERCUTANEOUS APPROACH: ICD-10-PCS | Performed by: SURGERY

## 2018-12-21 PROCEDURE — 71045 X-RAY EXAM CHEST 1 VIEW: CPT

## 2018-12-21 PROCEDURE — 85027 COMPLETE CBC AUTOMATED: CPT | Performed by: INTERNAL MEDICINE

## 2018-12-21 PROCEDURE — 83735 ASSAY OF MAGNESIUM: CPT | Performed by: INTERNAL MEDICINE

## 2018-12-21 PROCEDURE — 36561 INSERT TUNNELED CV CATH: CPT | Performed by: SURGERY

## 2018-12-21 PROCEDURE — 99239 HOSP IP/OBS DSCHRG MGMT >30: CPT | Performed by: INTERNAL MEDICINE

## 2018-12-21 PROCEDURE — 76937 US GUIDE VASCULAR ACCESS: CPT | Performed by: SURGERY

## 2018-12-21 PROCEDURE — C1788 PORT, INDWELLING, IMP: HCPCS | Performed by: SURGERY

## 2018-12-21 PROCEDURE — 84100 ASSAY OF PHOSPHORUS: CPT | Performed by: INTERNAL MEDICINE

## 2018-12-21 PROCEDURE — 0JH63WZ INSERTION OF TOTALLY IMPLANTABLE VASCULAR ACCESS DEVICE INTO CHEST SUBCUTANEOUS TISSUE AND FASCIA, PERCUTANEOUS APPROACH: ICD-10-PCS | Performed by: SURGERY

## 2018-12-21 PROCEDURE — 77001 FLUOROGUIDE FOR VEIN DEVICE: CPT

## 2018-12-21 PROCEDURE — 82330 ASSAY OF CALCIUM: CPT | Performed by: INTERNAL MEDICINE

## 2018-12-21 PROCEDURE — 80048 BASIC METABOLIC PNL TOTAL CA: CPT | Performed by: INTERNAL MEDICINE

## 2018-12-21 DEVICE — PORT HEMODIAL INTERMED PROFILE 8FR KIT: Type: IMPLANTABLE DEVICE | Site: CHEST  WALL | Status: FUNCTIONAL

## 2018-12-21 RX ORDER — FENTANYL CITRATE/PF 50 MCG/ML
25 SYRINGE (ML) INJECTION
Status: COMPLETED | OUTPATIENT
Start: 2018-12-21 | End: 2018-12-21

## 2018-12-21 RX ORDER — ONDANSETRON 2 MG/ML
INJECTION INTRAMUSCULAR; INTRAVENOUS AS NEEDED
Status: DISCONTINUED | OUTPATIENT
Start: 2018-12-21 | End: 2018-12-21 | Stop reason: SURG

## 2018-12-21 RX ORDER — MIDAZOLAM HYDROCHLORIDE 1 MG/ML
INJECTION INTRAMUSCULAR; INTRAVENOUS AS NEEDED
Status: DISCONTINUED | OUTPATIENT
Start: 2018-12-21 | End: 2018-12-21 | Stop reason: SURG

## 2018-12-21 RX ORDER — OXYCODONE HYDROCHLORIDE 5 MG/1
5 TABLET ORAL EVERY 4 HOURS PRN
Qty: 10 TABLET | Refills: 0 | Status: ON HOLD | OUTPATIENT
Start: 2018-12-21 | End: 2019-01-02

## 2018-12-21 RX ORDER — BUPIVACAINE HYDROCHLORIDE 5 MG/ML
INJECTION, SOLUTION PERINEURAL AS NEEDED
Status: DISCONTINUED | OUTPATIENT
Start: 2018-12-21 | End: 2018-12-21 | Stop reason: HOSPADM

## 2018-12-21 RX ORDER — SODIUM CHLORIDE, SODIUM LACTATE, POTASSIUM CHLORIDE, CALCIUM CHLORIDE 600; 310; 30; 20 MG/100ML; MG/100ML; MG/100ML; MG/100ML
125 INJECTION, SOLUTION INTRAVENOUS CONTINUOUS
Status: DISCONTINUED | OUTPATIENT
Start: 2018-12-21 | End: 2018-12-21

## 2018-12-21 RX ORDER — FENTANYL CITRATE 50 UG/ML
INJECTION, SOLUTION INTRAMUSCULAR; INTRAVENOUS AS NEEDED
Status: DISCONTINUED | OUTPATIENT
Start: 2018-12-21 | End: 2018-12-21 | Stop reason: SURG

## 2018-12-21 RX ORDER — ONDANSETRON 2 MG/ML
4 INJECTION INTRAMUSCULAR; INTRAVENOUS ONCE AS NEEDED
Status: DISCONTINUED | OUTPATIENT
Start: 2018-12-21 | End: 2018-12-21 | Stop reason: HOSPADM

## 2018-12-21 RX ORDER — LIDOCAINE HYDROCHLORIDE 10 MG/ML
INJECTION, SOLUTION INFILTRATION; PERINEURAL AS NEEDED
Status: DISCONTINUED | OUTPATIENT
Start: 2018-12-21 | End: 2018-12-21 | Stop reason: SURG

## 2018-12-21 RX ORDER — OXYCODONE HYDROCHLORIDE 5 MG/1
5 TABLET ORAL EVERY 4 HOURS PRN
Status: DISCONTINUED | OUTPATIENT
Start: 2018-12-21 | End: 2018-12-21 | Stop reason: HOSPADM

## 2018-12-21 RX ORDER — SODIUM CHLORIDE, SODIUM LACTATE, POTASSIUM CHLORIDE, CALCIUM CHLORIDE 600; 310; 30; 20 MG/100ML; MG/100ML; MG/100ML; MG/100ML
125 INJECTION, SOLUTION INTRAVENOUS CONTINUOUS
Status: DISCONTINUED | OUTPATIENT
Start: 2018-12-21 | End: 2018-12-21 | Stop reason: HOSPADM

## 2018-12-21 RX ORDER — PROPOFOL 10 MG/ML
INJECTION, EMULSION INTRAVENOUS AS NEEDED
Status: DISCONTINUED | OUTPATIENT
Start: 2018-12-21 | End: 2018-12-21 | Stop reason: SURG

## 2018-12-21 RX ORDER — ALBUTEROL SULFATE 2.5 MG/3ML
2.5 SOLUTION RESPIRATORY (INHALATION) EVERY 4 HOURS PRN
Status: DISCONTINUED | OUTPATIENT
Start: 2018-12-21 | End: 2018-12-21 | Stop reason: HOSPADM

## 2018-12-21 RX ORDER — POTASSIUM CHLORIDE 14.9 MG/ML
20 INJECTION INTRAVENOUS ONCE
Status: COMPLETED | OUTPATIENT
Start: 2018-12-21 | End: 2018-12-21

## 2018-12-21 RX ORDER — ACETAMINOPHEN 325 MG/1
650 TABLET ORAL EVERY 6 HOURS PRN
Refills: 0 | Status: ON HOLD
Start: 2018-12-21 | End: 2019-01-02

## 2018-12-21 RX ORDER — POTASSIUM CHLORIDE 20MEQ/15ML
40 LIQUID (ML) ORAL
Qty: 900 ML | Refills: 0
Start: 2018-12-21 | End: 2019-03-08 | Stop reason: SDUPTHER

## 2018-12-21 RX ADMIN — SODIUM CHLORIDE, SODIUM LACTATE, POTASSIUM CHLORIDE, AND CALCIUM CHLORIDE: .6; .31; .03; .02 INJECTION, SOLUTION INTRAVENOUS at 07:20

## 2018-12-21 RX ADMIN — FENTANYL CITRATE 25 MCG: 50 INJECTION INTRAMUSCULAR; INTRAVENOUS at 09:19

## 2018-12-21 RX ADMIN — DEXAMETHASONE SODIUM PHOSPHATE 8 MG: 10 INJECTION INTRAMUSCULAR; INTRAVENOUS at 07:44

## 2018-12-21 RX ADMIN — POTASSIUM PHOSPHATE, MONOBASIC AND POTASSIUM PHOSPHATE, DIBASIC 12 MMOL: 224; 236 INJECTION, SOLUTION INTRAVENOUS at 11:40

## 2018-12-21 RX ADMIN — OXYCODONE HYDROCHLORIDE 5 MG: 5 TABLET ORAL at 11:43

## 2018-12-21 RX ADMIN — FENTANYL CITRATE 25 MCG: 50 INJECTION INTRAMUSCULAR; INTRAVENOUS at 09:22

## 2018-12-21 RX ADMIN — ENOXAPARIN SODIUM 40 MG: 40 INJECTION SUBCUTANEOUS at 09:57

## 2018-12-21 RX ADMIN — SODIUM CHLORIDE, SODIUM LACTATE, POTASSIUM CHLORIDE, AND CALCIUM CHLORIDE 125 ML/HR: .6; .31; .03; .02 INJECTION, SOLUTION INTRAVENOUS at 07:01

## 2018-12-21 RX ADMIN — POTASSIUM CHLORIDE 20 MEQ: 200 INJECTION, SOLUTION INTRAVENOUS at 09:57

## 2018-12-21 RX ADMIN — MIDAZOLAM HYDROCHLORIDE 2 MG: 1 INJECTION, SOLUTION INTRAMUSCULAR; INTRAVENOUS at 07:33

## 2018-12-21 RX ADMIN — FENTANYL CITRATE 25 MCG: 50 INJECTION INTRAMUSCULAR; INTRAVENOUS at 09:01

## 2018-12-21 RX ADMIN — FENTANYL CITRATE 25 MCG: 50 INJECTION, SOLUTION INTRAMUSCULAR; INTRAVENOUS at 07:53

## 2018-12-21 RX ADMIN — Medication 100 MG: at 09:57

## 2018-12-21 RX ADMIN — FENTANYL CITRATE 25 MCG: 50 INJECTION INTRAMUSCULAR; INTRAVENOUS at 09:04

## 2018-12-21 RX ADMIN — SODIUM BICARBONATE 650 MG: 650 TABLET ORAL at 09:57

## 2018-12-21 RX ADMIN — FENTANYL CITRATE 25 MCG: 50 INJECTION INTRAMUSCULAR; INTRAVENOUS at 09:10

## 2018-12-21 RX ADMIN — Medication 220 MG: at 10:03

## 2018-12-21 RX ADMIN — FENTANYL CITRATE 25 MCG: 50 INJECTION INTRAMUSCULAR; INTRAVENOUS at 09:07

## 2018-12-21 RX ADMIN — LIDOCAINE HYDROCHLORIDE 50 MG: 10 INJECTION, SOLUTION INFILTRATION; PERINEURAL at 07:40

## 2018-12-21 RX ADMIN — FENTANYL CITRATE 25 MCG: 50 INJECTION INTRAMUSCULAR; INTRAVENOUS at 09:13

## 2018-12-21 RX ADMIN — PROPOFOL 150 MG: 10 INJECTION, EMULSION INTRAVENOUS at 07:40

## 2018-12-21 RX ADMIN — POTASSIUM CHLORIDE 40 MEQ: 20 SOLUTION ORAL at 13:49

## 2018-12-21 RX ADMIN — SODIUM CHLORIDE AND POTASSIUM CHLORIDE 100 ML/HR: .9; .15 SOLUTION INTRAVENOUS at 10:03

## 2018-12-21 RX ADMIN — FENTANYL CITRATE 25 MCG: 50 INJECTION INTRAMUSCULAR; INTRAVENOUS at 09:16

## 2018-12-21 RX ADMIN — FERROUS SULFATE TAB 325 MG (65 MG ELEMENTAL FE) 325 MG: 325 (65 FE) TAB at 09:57

## 2018-12-21 RX ADMIN — MAGNESIUM OXIDE TAB 400 MG (241.3 MG ELEMENTAL MG) 400 MG: 400 (241.3 MG) TAB at 09:57

## 2018-12-21 RX ADMIN — POTASSIUM CHLORIDE 40 MEQ: 20 SOLUTION ORAL at 09:57

## 2018-12-21 RX ADMIN — ONDANSETRON HYDROCHLORIDE 4 MG: 2 INJECTION, SOLUTION INTRAVENOUS at 08:23

## 2018-12-21 RX ADMIN — SPIRONOLACTONE 50 MG: 25 TABLET ORAL at 09:57

## 2018-12-21 NOTE — DISCHARGE INSTRUCTIONS
Patient follow-up with Dr Brett Lawson regarding post operative follow-up port placement in 2 weeks  Activity as tolerated although would refrain from any heavy lifting with the right arm nothing greater than 20 lb for 2 weeks  The dressing over the incisions may be removed on Sunday  You may shower on Sunday  No baths or swimming

## 2018-12-21 NOTE — INTERIM OP NOTE
INSERTION VENOUS PORT (PORT-A-CATH)  Postoperative Note  PATIENT NAME: Regine Buenrostro  : 1985  MRN: [id number]  [facility] OR ROOM 01    Surgery Date: 2018    Preop Diagnosis:  Hypokalemia [E87 6]    Post-Op Diagnosis Codes:     * Hypokalemia [E87 6]    Procedure(s) (LRB):  INSERTION VENOUS PORT (PORT-A-CATH) (N/A)    Surgeon(s) and Role:      Billie Bingham DO - Primary     * Sanjuan Phalen, PA-C - Assisting    Specimens:  * No specimens in log *    Estimated Blood Loss:   Minimal    Anesthesia Type:   General/LMA     Findings:    Patent right IJ with successful placement of ultrasound-guided right IJ MediPort  Complications:   None    SIGNATURE: Gail Do DO   DATE: 2018   TIME: 8:31 AM

## 2018-12-21 NOTE — ASSESSMENT & PLAN NOTE
· Recurrent, severe hypokalemia  · Secondary to an unclear etiology  · The patient was seen in consultation by Nephrology during the hospitalization  · She received PO and IV potassium chloride supplementation during the hospitalization  · Follow the potassium level and magnesium level closely as an outpatient  · The patient also had a Port-A-Cath placed during this hospitalization by Dr Lissa Beasley (General Surgery) for outpatient IV potassium chloride infusions  · If she has recurrent hypokalemia, I recommend the patient being admitted to a higher level of care with an electrolyte specialist  · Outpatient follow-up with Nephrology

## 2018-12-21 NOTE — PROGRESS NOTES
Progress Note - Nephrology   Refkassie Muniz 28 y o  female MRN: [id number]  Unit/Bed#: 413-01 Encounter: [id number]    A/P:  1  Acute kidney injury              Patient's creatinine had increased up to 1 2 mg/dL for unknown causes at this time  Patient was given some volume expansion and her creatinine this morning is back to her typical baseline  Patient denies any nausea vomiting diarrhea, and denies any change in her dietary hand bowel regimen  However, she noted that her weight has been decreasing and was at its lowest just prior to presentation here at the hospital     12/21: kidney function back to baseline  2  Hypokalemia              Patient's home oral potassium supplementation in the form of liquid due to absorption issues on tablet, unfortunately we had her on the tablets yesterday and I changed it this morning over to the liquid potassium  Will continue with supplementation throughout the day remaining on 160 mEq p o  Liquid potassium on top of IV supplementation  12/21: potassium up to 3 9  She will receive a port this morning and can go home  She will take 200 meq over Marlys week while away to avoid deficiency  She is scheduled for an infusion after New years and will receive 80 meq IV  Discussed treatment plan  S/he will call our office with any concerns over the holiday          3  Volume depletion-resolved status post volume expansion   4  Hypophosphatemia                phosphorus supplemented with IV K-Phos  5  Hypomagnesemia              Continue with oral supplementation, magnesium level is appropriate at this time  6  Status post gastric bypass surgery  7  Acidosis              Bicarbonate better this morning, will discontinue the patient's half-normal saline with 75 mEq/L of sodium bicarbonate a little later this morning          Follow up reason for today's visit:  Acute kidney injury/hypokalemia/electrolyte disorders    Hypokalemia    Patient Active Problem List   Diagnosis  Hypokalemia    History of gastric bypass    Migraine without aura and without status migrainosus, not intractable    Left-sided weakness    Hypophosphatemia    Anemia    Vitamin D deficiency    Weakness of both lower extremities    Weakness of both upper extremities    Elevated CPK    Vitamin B12 deficiency    Cervical lymphadenopathy    Generalized weakness    Paresthesia of both lower extremities    Paresthesias    B12 deficiency    Gastric bypass status for obesity    GERD (gastroesophageal reflux disease)    Migraine    WAGNER (obstructive sleep apnea)    Other intestinal malabsorption    Acute kidney injury ([facility] [address] )         Subjective:   Denies paresthesias, feels better overall  Objective:     Vitals: Blood pressure (!) 102/48, pulse 80, temperature 98 3 °F (36 8 °C), temperature source Temporal, resp  rate 18, height 5' 3" (1 6 m), weight 70 4 kg (155 lb 3 3 oz), SpO2 100 %  ,Body mass index is 27 49 kg/m²  Weight (last 2 days)     Date/Time   Weight    12/21/18 0600  70 4 (155 2)    12/20/18 0600  70 4 (155 2)                Intake/Output Summary (Last 24 hours) at 12/21/18 0717  Last data filed at 12/20/18 1741   Gross per 24 hour   Intake              300 ml   Output              200 ml   Net              100 ml     I/O last 3 completed shifts:   In: 300 [P O :300]  Out: 400 [Urine:400]         Physical Exam: BP (!) 102/48 (BP Location: Right arm)   Pulse 80   Temp 98 3 °F (36 8 °C) (Temporal)   Resp 18   Ht 5' 3" (1 6 m)   Wt 70 4 kg (155 lb 3 3 oz)   SpO2 100%   BMI 27 49 kg/m²     General Appearance:    Alert, cooperative, no distress, appears stated age   Head:    Normocephalic, without obvious abnormality, atraumatic   Eyes:    Conjunctiva/corneas clear   Ears:    Normal external ears   Nose:   Nares normal, septum midline, mucosa normal, no drainage    or sinus tenderness   Throat:   Lips, mucosa, and tongue normal; teeth and gums normal   Neck:   Supple   Back: Symmetric, no curvature, ROM normal, no CVA tenderness   Lungs:     Clear to auscultation bilaterally, respirations unlabored   Chest wall:    No tenderness or deformity   Heart:    Regular rate and rhythm, S1 and S2 normal, no murmur, rub   or gallop   Abdomen:     Soft, non-tender, bowel sounds active   Extremities:   Extremities normal, atraumatic, no cyanosis or edema   Skin:   Skin color, texture, turgor normal, no rashes or lesions   Lymph nodes:   Cervical normal   Neurologic:   CNII-XII intact            Lab, Imaging and other studies: I have personally reviewed pertinent labs  CBC:   Lab Results   Component Value Date    WBC 5 24 12/21/2018    HGB 11 0 (L) 12/21/2018    HCT 35 4 12/21/2018    MCV 98 12/21/2018     12/21/2018    MCH 30 5 12/21/2018    MCHC 31 1 (L) 12/21/2018    RDW 15 2 (H) 12/21/2018    MPV 11 0 12/21/2018     CMP:   Lab Results   Component Value Date    K 3 9 12/21/2018     (H) 12/21/2018    CO2 24 12/21/2018    BUN 6 12/21/2018    CREATININE 0 74 12/21/2018    CALCIUM 7 5 (L) 12/21/2018    EGFR 106 12/21/2018             Results from last 7 days  Lab Units 12/21/18  0446 12/20/18  0519 12/19/18  1217 12/19/18  0512   POTASSIUM mmol/L 3 9 3 2* 3 3* 2 9*   CHLORIDE mmol/L 111* 110* 109* 111*   CO2 mmol/L 24 21 17* 20*   BUN mg/dL 6 11 11 14   CREATININE mg/dL 0 74 0 74 0 82 0 81   CALCIUM mg/dL 7 5* 7 3* 7 3* 7 6*   ALK PHOS U/L  --  31*  --  37*   ALT U/L  --  22  --  23   AST U/L  --  15  --  19         Phosphorus:   Lab Results   Component Value Date    PHOS 2 5 (L) 12/21/2018     Magnesium:   Lab Results   Component Value Date    MG 2 0 12/21/2018     Urinalysis: No results found for: COLORU, CLARITYU, SPECGRAV, PHUR, LEUKOCYTESUR, NITRITE, PROTEINUA, GLUCOSEU, KETONESU, BILIRUBINUR, BLOODU  Ionized Calcium: No results found for: CAION  Coagulation: No results found for: PT, INR, APTT  Troponin:   No results found for: TROPONINI  ABG: No results found for: PHART, NRO7FUK, PO2ART, CNM5GNM, S1DPGXJH, BEART, SOURCE  Radiology review:     IMAGING  No results found      Current Facility-Administered Medications   Medication Dose Route Frequency    acetaminophen (TYLENOL) tablet 650 mg  650 mg Oral Q6H PRN    amitriptyline (ELAVIL) tablet 50 mg  50 mg Oral HS    b complex-vitamin C-folic acid (NEPHROCAPS) capsule 1 capsule  1 capsule Oral Daily With Dinner    enoxaparin (LOVENOX) subcutaneous injection 40 mg  40 mg Subcutaneous Q24H MIKE    ferrous sulfate tablet 325 mg  325 mg Oral BID With Meals    lactated ringers infusion  125 mL/hr Intravenous Continuous    magnesium oxide (MAG-OX) tablet 400 mg  400 mg Oral Daily    ondansetron (ZOFRAN) injection 4 mg  4 mg Intravenous Q4H PRN    potassium chloride 10 % oral solution 40 mEq  40 mEq Oral 4x Daily    potassium chloride 20 mEq IVPB (premix)  20 mEq Intravenous Once    potassium phosphate 12 mmol in sodium chloride 0 9 % 250 mL infusion  12 mmol Intravenous Once    sodium bicarbonate tablet 650 mg  650 mg Oral BID    sodium chloride 0 9 % with KCl 20 mEq/L infusion (premix)  100 mL/hr Intravenous Continuous    spironolactone (ALDACTONE) tablet 50 mg  50 mg Oral Daily    thiamine (VITAMIN B1) tablet 100 mg  100 mg Oral Daily    zinc sulfate (ZINCATE) capsule 220 mg  220 mg Oral Daily     Medications Discontinued During This Encounter   Medication Reason    sodium chloride 0 9 % bolus 250 mL     potassium chloride 20 mEq IVPB (premix)     potassium chloride 20 mEq IVPB (premix)     spironolactone (ALDACTONE) tablet 50 mg     sodium chloride 0 9 % infusion     potassium chloride 20 mEq IVPB (premix)     potassium chloride (K-DUR,KLOR-CON) CR tablet 40 mEq     potassium chloride 20 mEq IVPB (premix)     sodium bicarbonate 75 mEq in sodium chloride 0 45 % 1,000 mL infusion        Tabby Dover MD

## 2018-12-21 NOTE — DISCHARGE SUMMARY
Discharge- Merleregina Nayeli 1985, 28 y o  female MRN: [id number]    Unit/Bed#: 413-01 Encounter: [id number]    Primary Care Provider: ARIADNA Beyer   Date and time admitted to hospital: 12/18/2018  1:09 PM        * Hypokalemia   Assessment & Plan    · Recurrent, severe hypokalemia  · Secondary to an unclear etiology  · The patient was seen in consultation by Nephrology during the hospitalization  · She received PO and IV potassium chloride supplementation during the hospitalization  · Follow the potassium level and magnesium level closely as an outpatient  · The patient also had a Port-A-Cath placed during this hospitalization by Dr Leelee Marcelino (General Surgery) for outpatient IV potassium chloride infusions  · If she has recurrent hypokalemia, I recommend the patient being admitted to a higher level of care with an electrolyte specialist  · Outpatient follow-up with Nephrology     Acute kidney injury [facility])   Assessment & Plan    · Likely prerenal azotemia  · Resolved with IV fluids  · IV fluids per Nephrology  · Avoid all nephrotoxic agents  · Serial laboratory testing to monitor her renal function and electrolytes as an outpatient       WAGNER (obstructive sleep apnea)   Assessment & Plan    · She does not wear CPAP at home  · Outpatient sleep study     Vitamin D deficiency   Assessment & Plan    · Continue vitamin D supplementation upon discharge         Discharging Physician / Practitioner: Janak Bloom DO  PCP: Temi Beyer  Admission Date: 12/18/18  Discharge Date: 12/21/18      Consultations During Hospital Stay:  · Nephrology  · General Surgery    Procedures Performed:   · Port-A-Cath placement by Dr Leelee Marcelion (General Surgery) today, 12/21/2018    Significant Findings / Test Results:   Xr Chest Portable    Result Date: 12/21/2018  Impression: No acute cardiopulmonary disease   Workstation performed: [id number]     Fl Guided Central Venous Access Device Insertion    Result Date: 12/21/2018  Impression: Fluoroscopic guidance provided for vascular access procedure  Please refer to the separate procedure notes for additional details  Workstation performed: [id number]     ECG 12 lead   Order: [id number]   Status:  Final result   Visible to patient:  Yes (MyChart)   Next appt:  01/03/2019 at 08:00 AM in Infusion Therapy ([facility] INF CHAIR 1)    Ref Range & Units 12/18/18 1328   Ventricular Rate BPM 80    Atrial Rate BPM 80    ME Interval ms 116    QRSD Interval ms 102    QT Interval ms 374    QTC Interval ms 431    P Thoreau degrees 62    QRS Axis degrees 34    T Wave Axis degrees 53    Narrative     Normal sinus rhythm  Normal ECG  When compared with ECG of 26-NOV-2018 17:49,  No significant change was found  Confirmed by Tabitha Jay ([id number]) on 12/19/2018 9:04:16 AM      Specimen Collected: 12/18/18 13:28   Last Resulted: 12/19/18 09:04                Incidental Findings:   · None     Test Results Pending at Discharge (will require follow up): · None     Outpatient Tests Requested:  · Electrolyte monitoring by Nephrology    Complications:  None    Reason for Admission:  Hypokalemia    Hospital Course: Vega Diggs is a 28 y o  female patient who originally presented to the hospital on 12/18/2018 due to paresthesias and hypokalemia  Please see above list of diagnoses and related plan for additional information  Condition at Discharge: good     Discharge Day Visit / Exam:     Subjective: The patient was seen and examined  The patient is doing better  Her paresthesias have resolved  No chest pain  No shortness of breath  No abdominal pain  No nausea or vomiting    Vitals: Blood Pressure: 102/51 (12/21/18 1100)  Pulse: 95 (12/21/18 1100)  Temperature: (!) 97 °F (36 1 °C) (12/21/18 1000)  Temp Source: Temporal (12/21/18 1000)  Respirations: 18 (12/21/18 1100)  Height: 5' 3" (160 cm) (12/19/18 1349)  Weight - Scale: 70 4 kg (155 lb 3 3 oz) (12/21/18 0600)  SpO2: 99 % (12/21/18 1044)  Exam:   Physical Exam  General:  NAD, awake, alert, follows commands  HEENT:  NC/AT, mucous membranes moist  Neck:  Supple, No JVP elevation  CV:  + S1, + S2, RRR  Pulm:  Lung fields are CTA bilaterally  Abd:  Soft, Non-tender, Non-distended  Ext:  No clubbing/cyanosis/edema  Skin:  No rashes         Discharge instructions/Information to patient and family:   See after visit summary for information provided to patient and family  Provisions for Follow-Up Care:  See after visit summary for information related to follow-up care and any pertinent home health orders  Disposition:     Home       Planned Readmission:  No     Discharge Statement:  I spent 35 minutes discharging the patient  This time was spent on the day of discharge  I had direct contact with the patient on the day of discharge  Greater than 50% of the total time was spent examining patient, answering all patient questions, arranging and discussing plan of care with patient as well as directly providing post-discharge instructions  Additional time then spent on discharge activities  Discharge Medications:  See after visit summary for reconciled discharge medications provided to patient and family        ** Please Note: This note has been constructed using a voice recognition system **

## 2018-12-21 NOTE — SOCIAL WORK
Discussed with patient preferences on discharge;understanding how to manage health at home; purpose of taking medications; importance of follow up care/appointments; and symptoms to watch out for once discharged home  CM attempted to schedule pt's follow up appointment with her PCP: Dr Michelle Lawler at [facility] in [facility] but the office is closed  Pt will be calling to schedule her own follow up

## 2018-12-21 NOTE — CONSULTS
Consultation - General Surgery   Mayra  28 y o  female MRN: [id number]  Unit/Bed#: 154-50 Encounter: [id number]    Assessment/Plan     Assessment:  42-year-old female admitted with recurrent hypokalemia of unknown etiology  Patient requiring multiple admissions in the infusions  Needing central venous access for future patient infusions  Plan:  - NPO after midnight  - follow-up labs  - preop consent for placement of a MediPort  The procedure self including all associated risks and benefits were discussed with patient in great detail  The patient verbalized understands risks is willing to proceed, consent  signed    History of Present Illness   *  HPI:  Mayra  is a 28 y o  female with a history of recurrent hypokalemia, currently admitted once again with low potassium and the need for recurrent potassium infusions  Currently doing well  No nausea vomiting  No fevers or chills  No chest pain or shortness of breath  No abdominal pain   Patient states she is wanting to have a MediPort placed so she can receive infusions and blood draws as an outpatient    Inpatient consult to Acute Care Surgery  Consult performed by: Kalin Fagan  Consult ordered by: Tania Francois          Review of Systems    A 10 point review systems was conducted, all negative except as noted above HPI    Historical Information   Past Medical History:   Diagnosis Date    H  pylori infection     Hypokalemia     Migraine      Past Surgical History:   Procedure Laterality Date    ABDOMINAL SURGERY      APPENDECTOMY      CHOLECYSTECTOMY      GASTRIC BYPASS      HYSTERECTOMY       Social History   History   Alcohol Use    Yes     Comment: socially     History   Drug Use No     History   Smoking Status    Never Smoker   Smokeless Tobacco    Never Used     Family History: non-contributory    Meds/Allergies   all current active meds have been reviewed  Allergies   Allergen Reactions    Nsaids      Other reaction(s): Other (Please comment)  Should not take s/p bariatric surgery       Objective   First Vitals:   Blood Pressure: 127/74 (12/18/18 1300)  Pulse: 95 (12/18/18 1300)  Temperature: 98 9 °F (37 2 °C) (12/18/18 1300)  Temp Source: Temporal (12/18/18 1300)  Respirations: 18 (12/18/18 1300)  Height: 5' 3" (160 cm) (12/19/18 1349)  Weight - Scale: 66 7 kg (147 lb) (12/18/18 1300)  SpO2: 96 % (12/18/18 1300)    Current Vitals:   Blood Pressure: (!) 102/48 (12/21/18 0440)  Pulse: 80 (12/21/18 0440)  Temperature: 98 3 °F (36 8 °C) (12/21/18 0440)  Temp Source: Temporal (12/21/18 0440)  Respirations: 18 (12/21/18 0440)  Height: 5' 3" (160 cm) (12/19/18 1349)  Weight - Scale: 70 4 kg (155 lb 3 3 oz) (12/21/18 0600)  SpO2: 100 % (12/21/18 0440)      Intake/Output Summary (Last 24 hours) at 12/21/18 0724  Last data filed at 12/20/18 1741   Gross per 24 hour   Intake              300 ml   Output              200 ml   Net              100 ml       Invasive Devices     Peripheral Intravenous Line            Peripheral IV 12/18/18 Left Antecubital 2 days                Physical Exam   General:  No acute distress resting comfortably  HEENT:  Normocephalic, atraumatic, trachea midline  CV:  S1-S2 audible, regular rhythm  Pulmonary:  Clear auscultation bilaterally  Abdomen:  Soft, nontender nondistended  MSK:  Lower extremities without clubbing cyanosis  Neurologic:  Alert oriented x3, cranial 2-12 grossly intact  Psych:  Mood and affect appropriate      Lab Results:   I have personally reviewed pertinent lab results    , CBC:   Lab Results   Component Value Date    WBC 5 24 12/21/2018    HGB 11 0 (L) 12/21/2018    HCT 35 4 12/21/2018    MCV 98 12/21/2018     12/21/2018    MCH 30 5 12/21/2018    MCHC 31 1 (L) 12/21/2018    RDW 15 2 (H) 12/21/2018    MPV 11 0 12/21/2018   , CMP:   Lab Results   Component Value Date    SODIUM 140 12/21/2018    K 3 9 12/21/2018     (H) 12/21/2018    CO2 24 12/21/2018    BUN 6 12/21/2018 CREATININE 0 74 12/21/2018    CALCIUM 7 5 (L) 12/21/2018    EGFR 106 12/21/2018   , Coagulation: No results found for: PT, INR, APTT, Urinalysis: No results found for: Andressa Clarice, SPECGRAV, PHUR, LEUKOCYTESUR, NITRITE, PROTEINUA, GLUCOSEU, KETONESU, BILIRUBINUR, BLOODU, Amylase: No results found for: AMYLASE, Lipase: No results found for: LIPASE  Imaging: I have personally reviewed pertinent reports  EKG, Pathology, and Other Studies: I have personally reviewed pertinent reports

## 2018-12-21 NOTE — ASSESSMENT & PLAN NOTE
· Likely prerenal azotemia  · Resolved with IV fluids  · IV fluids per Nephrology  · Avoid all nephrotoxic agents  · Serial laboratory testing to monitor her renal function and electrolytes as an outpatient

## 2018-12-21 NOTE — SOCIAL WORK
Pt is a HRR but unable to refer pt to the outpatient care coordination dept as she doesn't see a [facility] provider

## 2018-12-21 NOTE — OP NOTE
OPERATIVE REPORT  PATIENT NAME: Henry Muniz    :  1985  MRN: [id number]  Pt Location: [facility] OR ROOM 01    SURGERY DATE: 2018    Surgeon(s) and Role: Rojelio Chavira,  - Primary     * Elda Lepe PA-C - Assisting    Preop Diagnosis:  Hypokalemia [E87 6]    Post-Op Diagnosis Codes:     * Hypokalemia [E87 6]    Procedure(s) (LRB):  INSERTION VENOUS PORT (PORT-A-CATH) (N/A)    Specimen(s):  * No specimens in log *    Estimated Blood Loss:   Minimal    Drains:       Anesthesia Type:   General/LMA    Operative Indications:  Hypokalemia [E87 6]      Operative Findings:   Patent right IJ with successful placement of ultrasound-guided right IJ MediPort    Complications:   None    Procedure and Technique:  The patient was brought to the operating arena and placed in supine position operating table  All regular monitoring devices were connected  The patient underwent general anesthesia with LMA intubation without complication  The right arm was then tucked and all pressure points padded  Using an ultrasound machine the right IJ was identified and marked with a surgical marking pen  Patient received perioperative antibiotic's  Bilateral lower extremity sequential compressive devices in place for DVT prophylaxis  The patient was then prepped and draped in usual sterile fashion  A timeout was performed to verify the correct patient, procedure, site, and positioning  Under ultrasonic guidance the right internal jugular vein was identified and a finder needle was inserted and dark venous blood was then aspirated  A guidewire was then inserted through the needle into the right IJ and advanced without any resistance  The needle was then removed  It was confirmed with anesthesia there is no arrhythmias or PVCs noted  The wire was then secured to the drapes  Attention was then turned to the chest  Approximately 2 fingerbreadths below the right clavicle a 4cm incision was made with a 15 blade scalpel   The Incision was taken down through the dermal tissue and subcutaneous site using electrocautery until the pre-pectoral fascia was encountered  Using [place] retractors the inferior portion of the incision was elevated and a pocket was created so that the Mediport fit comfortably  Hemostasis was secured  The length of the catheter was then measured  Using a C-arm it was approximated where the catheter with end at the caval atrial junction  A tunneler was then used to create a passage through the subcutaneous tissue leading from the incision and ending at the insertion of the guidewire into the right IJ  The catheter was then brought through the newly created tunnel  The catheter was then clamped at the proximal portion  A dilator/sheath was inserted over the wire and the wire was removed  The catheter was then inserted through the sheath gently using DeBakey forceps as the sheath was then peeled away  Placement of the catheter was then confirmed using C-arm and was sitting approximately the 2nd vertebral body below the allegra, approximately at the atrial caval junction  The proximal portion of the catheter was then cut at 26cm and then secured to the 185 M  [place]  The Mediport was then flushed with heparinized saline, and flushed easily  Mediport was then placed into the pocket and secured to the prepectoral fascia using 2-0 proline sutures  Hemostasis was secured  The neck incision was closed with a 4-0 Monocryl and the incision overlying the pocket was closed in layers with 3-0 Vicryl for the dermal layer and a running 4-0 Monocryl for the skin  A dry sterile dressing was then placed          The patient tolerated procedure well was taken to the postanesthesia care unit in stable condition  All lap, needle, and history counts were correct         I was present for the entire procedure and A qualified resident physician was not available, PORTILLO Hoyos was required for adequate exposure retraction during the case     Patient Disposition:  PACU     SIGNATURE: Dania Ortiz DO  DATE: December 21, 2018  TIME: 8:32 AM

## 2018-12-21 NOTE — ANESTHESIA POSTPROCEDURE EVALUATION
Post-Op Assessment Note      CV Status:  Stable    Mental Status:  Alert and awake    Hydration Status:  Euvolemic and stable    PONV Controlled:  Controlled    Airway Patency:  Patent    Post Op Vitals Reviewed: Yes          Staff: Anesthesiologist           BP   129/59   Temp   97 6   Pulse  91   Resp   16   SpO2   100

## 2018-12-21 NOTE — PLAN OF CARE
Problem: DISCHARGE PLANNING - CARE MANAGEMENT  Goal: Discharge to post-acute care or home with appropriate resources  INTERVENTIONS:  - Conduct assessment to determine patient/family and health care team treatment goals, and need for post-acute services based on payer coverage, community resources, and patient preferences, and barriers to discharge  - Address psychosocial, clinical, and financial barriers to discharge as identified in assessment in conjunction with the patient/family and health care team  - Arrange appropriate level of post-acute services according to patient's   needs and preference and payer coverage in collaboration with the physician and health care team  - Communicate with and update the patient/family, physician, and health care team regarding progress on the discharge plan  - Arrange appropriate transportation to post-acute venues   Outcome: Completed Date Met: 12/21/18  -MS home with outpatient follow up

## 2018-12-21 NOTE — ANESTHESIA PREPROCEDURE EVALUATION
Review of Systems/Medical History  Patient summary reviewed  Chart reviewed  No history of anesthetic complications     Cardiovascular  Negative cardio ROS Exercise tolerance (METS): >4,     Pulmonary  Negative pulmonary ROS No sleep apnea ,   Comment: Hx WAGNER, resolved with weight loss     GI/Hepatic    GERD (rare GERD) , Bariatric surgery (2014),          Comment: Hx of hypokalemia  (since summer 2018) Admitted with K 3 1  TICO     Endo/Other  Negative endo/other ROS      GYN    Hysterectomy,        Hematology  Anemia ,     Musculoskeletal  Negative musculoskeletal ROS        Neurology    Headaches,    Psychology   Negative psychology ROS              Physical Exam    Airway    Mallampati score: III  TM Distance: >3 FB  Neck ROM: full     Dental   No notable dental hx     Cardiovascular  Comment: Negative ROS,     Pulmonary      Other Findings    Results for Marie Wheeler (MRN [id number]) as of 12/21/2018 07:15   Ref  Range 12/21/2018 04:46   eGFR Latest Units: ml/min/1 73sq m 106   Sodium Latest Ref Range: 136 - 145 mmol/L 140   Potassium Latest Ref Range: 3 5 - 5 3 mmol/L 3 9   Chloride Latest Ref Range: 100 - 108 mmol/L 111 (H)   CO2 Latest Ref Range: 21 - 32 mmol/L 24   Anion Gap Latest Ref Range: 4 - 13 mmol/L 5   BUN Latest Ref Range: 5 - 25 mg/dL 6   Creatinine Latest Ref Range: 0 60 - 1 30 mg/dL 0 74   Glucose, Random Latest Ref Range: 65 - 140 mg/dL 80   Calcium Latest Ref Range: 8 3 - 10 1 mg/dL 7 5 (L)   Phosphorus Latest Ref Range: 2 7 - 4 5 mg/dL 2 5 (L)   Magnesium Latest Ref Range: 1 6 - 2 6 mg/dL 2 0   WBC Latest Ref Range: 4 31 - 10 16 Thousand/uL 5 24   Red Blood Cell Count Latest Ref Range: 3 81 - 5 12 Million/uL 3 61 (L)   Hemoglobin Latest Ref Range: 11 5 - 15 4 g/dL 11 0 (L)   HCT Latest Ref Range: 34 8 - 46 1 % 35 4       Anesthesia Plan  ASA Score- 2     Anesthesia Type- general with ASA Monitors  Additional Monitors:   Airway Plan: LMA      Comment: Anesthesia Plan:  Chart checked, pt evaluated  Routine Induction, LMA, Standard Monitors  Antiemetics  Questions answered, consent obtained        Plan Factors-  Patient did not smoke on day of surgery  Induction- intravenous  Postoperative Plan- Plan for postoperative opioid use  Informed Consent- Anesthetic plan and risks discussed with patient  I personally reviewed this patient with the CRNA  Discussed and agreed on the Anesthesia Plan with the CRNA  Karrie Nissen

## 2018-12-27 NOTE — UTILIZATION REVIEW
Notification of Discharge  This is a Notification of Discharge from our facility [facility] [facility]  Please be advised that this patient has been discharge from our facility  Below you will find the admission and discharge date and time including the patients disposition  PRESENTATION DATE: 12/18/2018  1:09 PM  IP ADMISSION DATE: 12/18/18 1521  DISCHARGE DATE: 12/21/2018  4:07 PM  DISPOSITION: [address] Utilization Review Department  Phone: [phone]; Fax [phone]  ATTENTION: Please call with any questions or concerns to [phone]  and carefully listen to the prompts so that you are directed to the right person  Send all requests for admission clinical reviews, approved or denied determinations and any other requests to fax [phone]   All voicemails are confidential

## 2019-01-02 ENCOUNTER — HOSPITAL ENCOUNTER (INPATIENT)
Facility: HOSPITAL | Age: 34
LOS: 1 days | Discharge: ANOTHER HOSPITAL | DRG: 425 | End: 2019-01-03
Attending: EMERGENCY MEDICINE | Admitting: INTERNAL MEDICINE
Payer: COMMERCIAL

## 2019-01-02 DIAGNOSIS — E87.6 CHRONIC HYPOKALEMIA: Primary | ICD-10-CM

## 2019-01-02 DIAGNOSIS — E87.6 HYPOKALEMIA: ICD-10-CM

## 2019-01-02 PROBLEM — R07.9 CHEST PAIN: Status: ACTIVE | Noted: 2019-01-02

## 2019-01-02 PROBLEM — N83.201 RIGHT OVARIAN CYST: Status: ACTIVE | Noted: 2019-01-02

## 2019-01-02 LAB
ALBUMIN SERPL BCP-MCNC: 3.8 G/DL (ref 3.5–5)
ALP SERPL-CCNC: 54 U/L (ref 46–116)
ALT SERPL W P-5'-P-CCNC: 24 U/L (ref 12–78)
ANION GAP SERPL CALCULATED.3IONS-SCNC: 10 MMOL/L (ref 4–13)
ANION GAP SERPL CALCULATED.3IONS-SCNC: 12 MMOL/L (ref 4–13)
AST SERPL W P-5'-P-CCNC: 22 U/L (ref 5–45)
BASOPHILS # BLD AUTO: 0.04 THOUSANDS/ΜL (ref 0–0.1)
BASOPHILS NFR BLD AUTO: 1 % (ref 0–1)
BILIRUB SERPL-MCNC: 0.4 MG/DL (ref 0.2–1)
BUN SERPL-MCNC: 16 MG/DL (ref 5–25)
BUN SERPL-MCNC: 16 MG/DL (ref 5–25)
CALCIUM SERPL-MCNC: 7.6 MG/DL (ref 8.3–10.1)
CALCIUM SERPL-MCNC: 8.5 MG/DL (ref 8.3–10.1)
CHLORIDE SERPL-SCNC: 101 MMOL/L (ref 100–108)
CHLORIDE SERPL-SCNC: 107 MMOL/L (ref 100–108)
CK SERPL-CCNC: 40 U/L (ref 26–192)
CO2 SERPL-SCNC: 24 MMOL/L (ref 21–32)
CO2 SERPL-SCNC: 25 MMOL/L (ref 21–32)
CREAT SERPL-MCNC: 1.01 MG/DL (ref 0.6–1.3)
CREAT SERPL-MCNC: 1.04 MG/DL (ref 0.6–1.3)
EOSINOPHIL # BLD AUTO: 0.12 THOUSAND/ΜL (ref 0–0.61)
EOSINOPHIL NFR BLD AUTO: 2 % (ref 0–6)
ERYTHROCYTE [DISTWIDTH] IN BLOOD BY AUTOMATED COUNT: 14.3 % (ref 11.6–15.1)
GFR SERPL CREATININE-BSD FRML MDRD: 70 ML/MIN/1.73SQ M
GFR SERPL CREATININE-BSD FRML MDRD: 73 ML/MIN/1.73SQ M
GLUCOSE SERPL-MCNC: 85 MG/DL (ref 65–140)
GLUCOSE SERPL-MCNC: 87 MG/DL (ref 65–140)
HCT VFR BLD AUTO: 41.8 % (ref 34.8–46.1)
HGB BLD-MCNC: 14.2 G/DL (ref 11.5–15.4)
IMM GRANULOCYTES # BLD AUTO: 0.02 THOUSAND/UL (ref 0–0.2)
IMM GRANULOCYTES NFR BLD AUTO: 0 % (ref 0–2)
LACTATE SERPL-SCNC: 0.5 MMOL/L (ref 0.5–2)
LYMPHOCYTES # BLD AUTO: 2.08 THOUSANDS/ΜL (ref 0.6–4.47)
LYMPHOCYTES NFR BLD AUTO: 28 % (ref 14–44)
MAGNESIUM SERPL-MCNC: 2.4 MG/DL (ref 1.6–2.6)
MCH RBC QN AUTO: 30.6 PG (ref 26.8–34.3)
MCHC RBC AUTO-ENTMCNC: 34 G/DL (ref 31.4–37.4)
MCV RBC AUTO: 90 FL (ref 82–98)
MONOCYTES # BLD AUTO: 0.64 THOUSAND/ΜL (ref 0.17–1.22)
MONOCYTES NFR BLD AUTO: 9 % (ref 4–12)
NEUTROPHILS # BLD AUTO: 4.53 THOUSANDS/ΜL (ref 1.85–7.62)
NEUTS SEG NFR BLD AUTO: 60 % (ref 43–75)
NRBC BLD AUTO-RTO: 0 /100 WBCS
PHOSPHATE SERPL-MCNC: 3.6 MG/DL (ref 2.7–4.5)
PLATELET # BLD AUTO: 314 THOUSANDS/UL (ref 149–390)
PMV BLD AUTO: 10.2 FL (ref 8.9–12.7)
POTASSIUM SERPL-SCNC: 2.1 MMOL/L (ref 3.5–5.3)
POTASSIUM SERPL-SCNC: 3.6 MMOL/L (ref 3.5–5.3)
PROCALCITONIN SERPL-MCNC: <0.05 NG/ML
PROT SERPL-MCNC: 7.8 G/DL (ref 6.4–8.2)
RBC # BLD AUTO: 4.64 MILLION/UL (ref 3.81–5.12)
SODIUM SERPL-SCNC: 138 MMOL/L (ref 136–145)
SODIUM SERPL-SCNC: 141 MMOL/L (ref 136–145)
TROPONIN I SERPL-MCNC: <0.02 NG/ML
WBC # BLD AUTO: 7.43 THOUSAND/UL (ref 4.31–10.16)

## 2019-01-02 PROCEDURE — 99284 EMERGENCY DEPT VISIT MOD MDM: CPT

## 2019-01-02 PROCEDURE — 83735 ASSAY OF MAGNESIUM: CPT | Performed by: EMERGENCY MEDICINE

## 2019-01-02 PROCEDURE — 83605 ASSAY OF LACTIC ACID: CPT | Performed by: INTERNAL MEDICINE

## 2019-01-02 PROCEDURE — 36415 COLL VENOUS BLD VENIPUNCTURE: CPT | Performed by: EMERGENCY MEDICINE

## 2019-01-02 PROCEDURE — 80048 BASIC METABOLIC PNL TOTAL CA: CPT | Performed by: INTERNAL MEDICINE

## 2019-01-02 PROCEDURE — 84484 ASSAY OF TROPONIN QUANT: CPT | Performed by: INTERNAL MEDICINE

## 2019-01-02 PROCEDURE — 80053 COMPREHEN METABOLIC PANEL: CPT | Performed by: EMERGENCY MEDICINE

## 2019-01-02 PROCEDURE — 84100 ASSAY OF PHOSPHORUS: CPT | Performed by: EMERGENCY MEDICINE

## 2019-01-02 PROCEDURE — 99235 HOSP IP/OBS SAME DATE MOD 70: CPT | Performed by: INTERNAL MEDICINE

## 2019-01-02 PROCEDURE — 84145 PROCALCITONIN (PCT): CPT | Performed by: INTERNAL MEDICINE

## 2019-01-02 PROCEDURE — 85025 COMPLETE CBC W/AUTO DIFF WBC: CPT | Performed by: EMERGENCY MEDICINE

## 2019-01-02 PROCEDURE — 82550 ASSAY OF CK (CPK): CPT | Performed by: INTERNAL MEDICINE

## 2019-01-02 PROCEDURE — 93005 ELECTROCARDIOGRAM TRACING: CPT

## 2019-01-02 RX ORDER — ONDANSETRON 2 MG/ML
4 INJECTION INTRAMUSCULAR; INTRAVENOUS EVERY 6 HOURS PRN
Status: DISCONTINUED | OUTPATIENT
Start: 2019-01-02 | End: 2019-01-03 | Stop reason: HOSPADM

## 2019-01-02 RX ORDER — CHOLECALCIFEROL (VITAMIN D3) 10 MCG
1 TABLET ORAL
Status: DISCONTINUED | OUTPATIENT
Start: 2019-01-02 | End: 2019-01-03 | Stop reason: HOSPADM

## 2019-01-02 RX ORDER — FERROUS SULFATE 325(65) MG
325 TABLET ORAL 2 TIMES DAILY WITH MEALS
Status: DISCONTINUED | OUTPATIENT
Start: 2019-01-02 | End: 2019-01-03 | Stop reason: HOSPADM

## 2019-01-02 RX ORDER — THIAMINE MONONITRATE (VIT B1) 100 MG
100 TABLET ORAL DAILY
Status: DISCONTINUED | OUTPATIENT
Start: 2019-01-03 | End: 2019-01-03 | Stop reason: HOSPADM

## 2019-01-02 RX ORDER — POTASSIUM CHLORIDE 14.9 MG/ML
40 INJECTION INTRAVENOUS ONCE
Status: COMPLETED | OUTPATIENT
Start: 2019-01-02 | End: 2019-01-02

## 2019-01-02 RX ORDER — ZINC GLUCONATE 50 MG
50 TABLET ORAL DAILY
Status: DISCONTINUED | OUTPATIENT
Start: 2019-01-03 | End: 2019-01-03 | Stop reason: HOSPADM

## 2019-01-02 RX ORDER — POTASSIUM CHLORIDE 20MEQ/15ML
40 LIQUID (ML) ORAL 4 TIMES DAILY
Status: DISCONTINUED | OUTPATIENT
Start: 2019-01-02 | End: 2019-01-03 | Stop reason: HOSPADM

## 2019-01-02 RX ORDER — ACETAMINOPHEN 325 MG/1
650 TABLET ORAL EVERY 6 HOURS PRN
Refills: 0
Start: 2019-01-02 | End: 2019-02-20 | Stop reason: HOSPADM

## 2019-01-02 RX ORDER — SODIUM CHLORIDE 9 MG/ML
125 INJECTION, SOLUTION INTRAVENOUS CONTINUOUS
Status: DISCONTINUED | OUTPATIENT
Start: 2019-01-02 | End: 2019-01-03 | Stop reason: HOSPADM

## 2019-01-02 RX ORDER — AMITRIPTYLINE HYDROCHLORIDE 50 MG/1
50 TABLET, FILM COATED ORAL
Status: DISCONTINUED | OUTPATIENT
Start: 2019-01-02 | End: 2019-01-03 | Stop reason: HOSPADM

## 2019-01-02 RX ORDER — POTASSIUM CHLORIDE 14.9 MG/ML
20 INJECTION INTRAVENOUS
Status: COMPLETED | OUTPATIENT
Start: 2019-01-02 | End: 2019-01-02

## 2019-01-02 RX ADMIN — POTASSIUM CHLORIDE 40 MEQ: 20 SOLUTION ORAL at 12:58

## 2019-01-02 RX ADMIN — POTASSIUM CHLORIDE 20 MEQ: 200 INJECTION, SOLUTION INTRAVENOUS at 14:44

## 2019-01-02 RX ADMIN — Medication 1 CAPSULE: at 17:32

## 2019-01-02 RX ADMIN — SODIUM CHLORIDE 1000 ML: 0.9 INJECTION, SOLUTION INTRAVENOUS at 13:01

## 2019-01-02 RX ADMIN — FERROUS SULFATE TAB 325 MG (65 MG ELEMENTAL FE) 325 MG: 325 (65 FE) TAB at 17:32

## 2019-01-02 RX ADMIN — POTASSIUM CHLORIDE 20 MEQ: 200 INJECTION, SOLUTION INTRAVENOUS at 13:02

## 2019-01-02 RX ADMIN — SODIUM CHLORIDE 125 ML/HR: 0.9 INJECTION, SOLUTION INTRAVENOUS at 14:41

## 2019-01-02 RX ADMIN — POTASSIUM CHLORIDE 40 MEQ: 20 SOLUTION ORAL at 17:32

## 2019-01-02 RX ADMIN — POTASSIUM CHLORIDE 40 MEQ: 20 SOLUTION ORAL at 21:12

## 2019-01-02 RX ADMIN — POTASSIUM CHLORIDE 20 MEQ: 200 INJECTION, SOLUTION INTRAVENOUS at 21:08

## 2019-01-02 RX ADMIN — POTASSIUM CHLORIDE 20 MEQ: 200 INJECTION, SOLUTION INTRAVENOUS at 17:15

## 2019-01-02 RX ADMIN — POTASSIUM CHLORIDE 40 MEQ: 200 INJECTION, SOLUTION INTRAVENOUS at 18:31

## 2019-01-02 RX ADMIN — POTASSIUM CHLORIDE 20 MEQ: 200 INJECTION, SOLUTION INTRAVENOUS at 19:25

## 2019-01-02 RX ADMIN — POTASSIUM CHLORIDE 40 MEQ: 200 INJECTION, SOLUTION INTRAVENOUS at 15:44

## 2019-01-02 RX ADMIN — AMITRIPTYLINE HYDROCHLORIDE 50 MG: 50 TABLET, FILM COATED ORAL at 21:12

## 2019-01-02 NOTE — ASSESSMENT & PLAN NOTE
· Nephrology evaluated the patient in consultation  · IV and PO potassium chloride supplementation was ordered per Nephrology's recommendations  · Follow the potassium level and magnesium level    The patient requires transfer to a higher level of care to see an electrolyte specialist with her multiple recent hospitalizations for severe, recurrent hypokalemia  The patient will be transferred to [facility] on the service of Dr Elia Herndon [facility] attending physician)

## 2019-01-02 NOTE — H&P
H&P- Charly Valencia 1985, 28 y o  female MRN: [id number]    Unit/Bed#: 419-01 Encounter: [id number]    Primary Care Provider: ARIADNA Cooley   Date and time admitted to hospital: 1/2/2019 10:47 AM        * Hypokalemia   Assessment & Plan    · Admit to med/surg level of care with telemetry monitoring  · Nephrology evaluated the patient in consultation  · IV and PO potassium chloride supplementation per Nephrology's recommendations  · Follow the potassium level and magnesium level     Acute kidney injury ([facility] [address] )   Assessment & Plan    · Likely prerenal azotemia  · NSS IV fluids  · Hold spironolactone  · Avoid all nephrotoxic agents  · Monitor for urinary retention  · Serial laboratory testing to monitor her renal function and electrolytes     Right ovarian cyst   Assessment & Plan    · Outpatient follow-up with OB/Gyn     WAGNER (obstructive sleep apnea)   Assessment & Plan    · The patient does not have a CPAP machine at home  · She needs an outpatient sleep study as soon as possible     History of gastric bypass   Assessment & Plan    · She will need an evaluation by Bariatric Surgery in the setting of hypokalemia           VTE Prophylaxis: Enoxaparin (Lovenox)  / reason for no mechanical VTE prophylaxis low risk for venous thromboembolism   Code Status:  Level 1-Full Code    Anticipated Length of Stay:  Patient will be admitted on an Inpatient basis with an anticipated length of stay of greater than 2 midnights  Justification for Hospital Stay:  The patient requires IV potassium chloride supplementation, continuous IV fluids, telemetry monitoring, and serial laboratory testing to monitor her renal function and electrolytes  Total Time for Visit, including Counseling / Coordination of Care: 45 minutes  Greater than 50% of this total time spent on direct patient counseling and coordination of care      Chief Complaint:  Hypokalemia    History of Present Illness:    Charly Valencia is a 28 y o  female who presents to the emergency department after being found to have hypokalemia on outpatient laboratory blood testing  The patient was instructed by her outpatient Nephrologist, Dr Jill Omalley, to present to the emergency department  The patient has had multiple recent hospitalization for recurrent, severe hypokalemia  Over the last few days, she developed worsening paresthesias involving all 4 extremities as well as involving her face  In addition, the patient has been experiencing chest pain and palpitations  Nothing seemed to improve her symptoms  No abdominal pain  No nausea or vomiting  No diarrhea  Review of Systems:    Review of Systems:  Per HPI, all other systems have been reviewed and were negative  Past Medical and Surgical History:     Past Medical History:   Diagnosis Date    H  pylori infection     Hypokalemia     Migraine        Past Surgical History:   Procedure Laterality Date    ABDOMINAL SURGERY      APPENDECTOMY      CHOLECYSTECTOMY      FL GUIDED CENTRAL VENOUS ACCESS REPLACEMENT  12/21/2018    GASTRIC BYPASS      HYSTERECTOMY      TUNNELED VENOUS PORT PLACEMENT N/A 12/21/2018    Procedure: INSERTION VENOUS PORT (PORT-A-CATH); Surgeon: Rosamaria Garcia DO;  Location: [facility] MAIN OR;  Service: General       Meds/Allergies:    Prior to Admission medications    Medication Sig Start Date End Date Taking?  Authorizing Provider   amitriptyline (ELAVIL) 50 mg tablet Take 50 mg by mouth daily at bedtime 8/27/18 8/27/19 Yes Historical Provider, MD   b complex-C-folic acid (NEPHRO-EDIE) 0 8 mg tablet Take 0 8 mg by mouth daily with dinner   Yes Historical Provider, MD   ergocalciferol (ERGOCALCIFEROL) 53990 units capsule Take 50,000 Units by mouth once a week Patient takes this med on Mondays    Yes Historical Provider, MD   ferrous sulfate 325 (65 Fe) mg tablet Take 325 mg by mouth 2 (two) times a day with meals   Yes Historical Provider, MD   magnesium oxide (MAG-OX) 400 mg Take 1 tablet (400 mg total) by mouth daily 11/6/18  Yes Daniel Wilkins MD   meclizine (ANTIVERT) 25 mg tablet Take 25 mg by mouth daily at bedtime 8/27/18  Yes Historical Provider, MD   potassium chloride 10 % Take 30 mL (40 mEq total) by mouth 5 (five) times a day 12/21/18  Yes [employer], DO   sodium bicarbonate 650 mg tablet Take 1 tablet (650 mg total) by mouth 2 (two) times a day 11/29/18  Yes Terrence John,    spironolactone (ALDACTONE) 50 mg tablet Take 1 tablet (50 mg total) by mouth daily 11/6/18  Yes Daniel Wilkins MD   thiamine 100 MG tablet Take 100 mg by mouth daily   Yes Historical Provider, MD   zinc gluconate 50 mg tablet Take 50 mg by mouth daily   Yes Historical Provider, MD   acetaminophen (TYLENOL) 325 mg tablet Take 2 tablets (650 mg total) by mouth every 6 (six) hours as needed for mild pain, headaches or fever 1/2/19   [employer], DO   enoxaparin (LOVENOX) 40 mg/0 4 mL Inject 0 4 mL (40 mg total) under the skin every 24 hours 1/2/19   [employer], DO   acetaminophen (TYLENOL) 325 mg tablet Take 2 tablets (650 mg total) by mouth every 6 (six) hours as needed for mild pain, headaches or fever 12/21/18 1/2/19  [employer], DO   oxyCODONE (ROXICODONE) 5 mg immediate release tablet Take 1 tablet (5 mg total) by mouth every 4 (four) hours as needed for moderate pain or severe pain Max Daily Amount: 30 mg 12/21/18 1/2/19  [employer], DO     I have reviewed home medications with patient personally  Allergies: Allergies   Allergen Reactions    Nsaids      Other reaction(s):  Other (Please comment)  Should not take s/p bariatric surgery       Social History:     Marital Status: /Civil Union     Substance Use History:   History   Alcohol Use    Yes     Comment: socially     History   Smoking Status    Never Smoker   Smokeless Tobacco    Never Used     History   Drug Use No       Family History:    non-contributory    Physical Exam:     Vitals:   Blood Pressure: 110/54 (01/02/19 1328)  Pulse: 90 (01/02/19 1328)  Temperature: 97 8 °F (36 6 °C) (01/02/19 1328)  Temp Source: Temporal (01/02/19 1051)  Respirations: 20 (01/02/19 1328)  Height: 5' 3" (160 cm) (01/02/19 1328)  Weight - Scale: 72 1 kg (158 lb 15 2 oz) (01/02/19 1328)  SpO2: 98 % (01/02/19 1328)    Physical Exam  General:  NAD, awake, alert, follows commands  HEENT:  NC/AT, mucous membranes moist  Neck:  Supple, No JVP elevation  CV:  + S1, + S2, RRR  Pulm:  Lung fields are CTA bilaterally  Abd:  Soft, Non-tender, Non-distended  Ext:  No clubbing/cyanosis/edema  Skin:  No rashes      Additional Data:     Lab Results: I have personally reviewed pertinent reports  Results from last 7 days  Lab Units 01/02/19  1119   WBC Thousand/uL 7 43   HEMOGLOBIN g/dL 14 2   HEMATOCRIT % 41 8   PLATELETS Thousands/uL 314   NEUTROS PCT % 60   LYMPHS PCT % 28   MONOS PCT % 9   EOS PCT % 2       Results from last 7 days  Lab Units 01/02/19  1119   SODIUM mmol/L 138   POTASSIUM mmol/L 2 1*   CHLORIDE mmol/L 101   CO2 mmol/L 25   BUN mg/dL 16   CREATININE mg/dL 1 04   ANION GAP mmol/L 12   CALCIUM mg/dL 8 5   ALBUMIN g/dL 3 8   TOTAL BILIRUBIN mg/dL 0 40   ALK PHOS U/L 54   ALT U/L 24   AST U/L 22   GLUCOSE RANDOM mg/dL 87                       Imaging: I have personally reviewed pertinent reports  No orders to display       [employer] Records Reviewed: Yes     ** Please Note: This note has been constructed using a voice recognition system   **

## 2019-01-02 NOTE — PLAN OF CARE
Problem: PAIN - ADULT  Goal: Verbalizes/displays adequate comfort level or baseline comfort level  Interventions:  - Encourage patient to monitor pain and request assistance  - Assess pain using appropriate pain scale  - Administer analgesics based on type and severity of pain and evaluate response  - Implement non-pharmacological measures as appropriate and evaluate response  - Consider cultural and social influences on pain and pain management  - Notify physician/advanced practitioner if interventions unsuccessful or patient reports new pain  Outcome: Progressing      Problem: INFECTION - ADULT  Goal: Absence or prevention of progression during hospitalization  INTERVENTIONS:  - Assess and monitor for signs and symptoms of infection  - Monitor lab/diagnostic results  - Monitor all insertion sites, i e  indwelling lines, tubes, and drains  - Monitor endotracheal (as able) and nasal secretions for changes in amount and color  - [place] appropriate cooling/warming therapies per order  - Administer medications as ordered  - Instruct and encourage patient and family to use good hand hygiene technique  - Identify and instruct in appropriate isolation precautions for identified infection/condition  Outcome: Progressing      Problem: SAFETY ADULT  Goal: Patient will remain free of falls  INTERVENTIONS:  - Assess patient frequently for physical needs  -  Identify cognitive and physical deficits and behaviors that affect risk of falls    -  Wiley fall precautions as indicated by assessment   - Educate patient/family on patient safety including physical limitations  - Instruct patient to call for assistance with activity based on assessment  - Modify environment to reduce risk of injury  - Consider OT/PT consult to assist with strengthening/mobility  Outcome: Progressing    Goal: Maintain or return to baseline ADL function  INTERVENTIONS:  -  Assess patient's ability to carry out ADLs; assess patient's baseline for ADL function and identify physical deficits which impact ability to perform ADLs (bathing, care of mouth/teeth, toileting, grooming, dressing, etc )  - Assess/evaluate cause of self-care deficits   - Assess range of motion  - Assess patient's mobility; develop plan if impaired  - Assess patient's need for assistive devices and provide as appropriate  - Encourage maximum independence but intervene and supervise when necessary  ¯ Involve family in performance of ADLs  ¯ Assess for home care needs following discharge   ¯ Request OT consult to assist with ADL evaluation and planning for discharge  ¯ Provide patient education as appropriate  Outcome: Progressing    Goal: Maintain or return mobility status to optimal level  INTERVENTIONS:  - Assess patient's baseline mobility status (ambulation, transfers, stairs, etc )    - Identify cognitive and physical deficits and behaviors that affect mobility  - Identify mobility aids required to assist with transfers and/or ambulation (gait belt, sit-to-stand, lift, walker, cane, etc )  - [place] fall precautions as indicated by assessment  - Record patient progress and toleration of activity level on Mobility SBAR; progress patient to next Phase/Stage  - Instruct patient to call for assistance with activity based on assessment  - Request Rehabilitation consult to assist with strengthening/weightbearing, etc   Outcome: Progressing      Problem: DISCHARGE PLANNING  Goal: Discharge to home or other facility with appropriate resources  INTERVENTIONS:  - Identify barriers to discharge w/patient and caregiver  - Arrange for needed discharge resources and transportation as appropriate  - Identify discharge learning needs (meds, wound care, etc )  - Arrange for interpretive services to assist at discharge as needed  - Refer to Case Management Department for coordinating discharge planning if the patient needs post-hospital services based on physician/advanced practitioner order or complex needs related to functional status, cognitive ability, or social support system  Outcome: Progressing      Problem: Knowledge Deficit  Goal: Patient/family/caregiver demonstrates understanding of disease process, treatment plan, medications, and discharge instructions  Complete learning assessment and assess knowledge base    Interventions:  - Provide teaching at level of understanding  - Provide teaching via preferred learning methods  Outcome: Progressing      Problem: METABOLIC, FLUID AND ELECTROLYTES - ADULT  Goal: Electrolytes maintained within normal limits  INTERVENTIONS:  - Monitor labs and assess patient for signs and symptoms of electrolyte imbalances  - Administer electrolyte replacement as ordered  - Monitor response to electrolyte replacements, including repeat lab results as appropriate  - Instruct patient on fluid and nutrition as appropriate  Outcome: Progressing    Goal: Fluid balance maintained  INTERVENTIONS:  - Monitor labs and assess for signs and symptoms of volume excess or deficit  - Monitor I/O and WT  - Instruct patient on fluid and nutrition as appropriate  Outcome: Progressing

## 2019-01-02 NOTE — DISCHARGE SUMMARY
Discharge- Ben Amin 1985, 28 y o  female MRN: [id number]    Unit/Bed#: 119-20 Encounter: [id number]    Primary Care Provider: ARIADNA Rogers   Date and time admitted to hospital: 1/2/2019 10:47 AM        * Hypokalemia   Assessment & Plan    · Nephrology evaluated the patient in consultation  · IV and PO potassium chloride supplementation was ordered per Nephrology's recommendations  · Follow the potassium level and magnesium level    The patient requires transfer to a higher level of care to see an electrolyte specialist with her multiple recent hospitalizations for severe, recurrent hypokalemia  The patient will be transferred to [facility] on the service of Dr Alena Noe [facility] attending physician)  Acute kidney injury ([facility] [address] )   Assessment & Plan    · Likely prerenal azotemia  · NSS IV fluids  · Hold spironolactone  · Avoid all nephrotoxic agents  · Monitor for urinary retention  · Serial laboratory testing to monitor her renal function and electrolytes     Chest pain   Assessment & Plan    · Likely secondary to hypokalemia  · Check troponin levels x 2 sets  · Telemetry monitoring     Right ovarian cyst   Assessment & Plan    · Outpatient follow-up with OB/Gyn     WAGNER (obstructive sleep apnea)   Assessment & Plan    · The patient does not have a CPAP machine at home  · She needs an outpatient sleep study as soon as possible     History of gastric bypass   Assessment & Plan    · She will need an evaluation by Bariatric Surgery in the setting of hypokalemia           Discharging Physician / Practitioner: Jagdeep Abel DO  PCP: Temi Rogers  Admission Date: 01/02/19  Discharge Date/Transfer Date: 01/02/19      Consultations During Hospital Stay:  · Nephrology    Procedures Performed:   · None        Complications:    · None    Reason for Admission:  Severe hypokalemia    Hospital Course:      Ben Amin is a 28 y o  female patient who originally presented to the [facility]\"" on 1/2/2019 due to severe hypokalemia  Please see above list of diagnoses and related plan for additional information  Condition at Discharge: stable     Discharge Day Visit / Exam:     * Please refer to separate progress note for these details *      Discharge instructions/Information to patient and family:   See after visit summary for information provided to patient and family  Provisions for Follow-Up Care:  See after visit summary for information related to follow-up care and any pertinent home health orders  Disposition:     [address] Transfer to [facility] on the service of Dr Alicia Agrawal [facility] Attending Physician)    The patient requires transfer to a higher level of care to see an electrolyte specialist with her multiple recent hospitalizations for severe, recurrent hypokalemia  The patient will be transferred to [facility] on the service of Dr Alicia Agrawal [facility] attending physician)  Discharge Statement:  I spent 45 minutes discharging the patient  This time was spent on the day of discharge  I had direct contact with the patient on the day of discharge  Greater than 50% of the total time was spent examining patient, answering all patient questions, arranging and discussing plan of care with patient as well as directly providing post-discharge instructions  Additional time then spent on discharge activities  Discharge Medications:  See after visit summary for reconciled discharge medications provided to patient and family        ** Please Note: This note has been constructed using a voice recognition system **

## 2019-01-02 NOTE — CONSULTS
Consultation - Nephrology   Ma Evangelist 28 y o  female MRN: [id number]  Unit/Bed#: RM01 Encounter: [id number]      A/P:  1  Hypokalemia    This is a chronic and ongoing issue  Please refer to prior consultations and H&Ps  More recently, the patient was admitted approximately 2 and half weeks ago with another episode of low potassium  Symptoms are essentially the same which include perioral paresthesias as well as paresthesias in her extremities  Patient denies muscle spasms or paralysis  Patient has been taking 40 mEq of liquid potassium 5 times a day in the outpatient setting  Patient denies nausea vomiting diarrhea  We have reviewed multiple times in the past if the patient has been abusing laxatives or experiencing various eating disorders such as bulimia, she denies all of these  In the past, in the setting of a decreased serum potassium, the urine potassium was noted to be as high as 15 millimole/L, this is certainly higher than the expected concentration which would be very close to undetectable  Nonetheless, as expressed in prior notes, it is surprising that the patient needs the amount of supplementation that she is currently given in the setting of a urine potassium concentration at 15 millimole/L  Presuming that the patient has a urinary output of around 2 L a day  Patient was also started on spironolactone an effort to try to maximize serum potassium, this has not been as successful as we had anticipated  Patient has also had a GI workup which was deemed to be negative for an intestinal absorption disorder  That being said, when we converted the patient from potassium tablets to the liquid potassium she did manage better and we were able to maintain her in the outpatient setting for longer periods of time  Fanconi syndrome was ruled out in a rudimentary way by checking urine for amino acids, which came back negative  Patient denies history of nephrolithiasis    More recently, we have been trying to infuse the patient with potassium in the short procedure unit as an outpatient  This will be facilitated in the future with the use of a Port-A-Cath which was recently placed and continues to heal at this time  Patient's blood work was drawn on December 31st, it was noted earlier today, January 2nd, that the potassium level was 2 8 millimole/L  As noted in history of present illness, at presentation the patient's potassium was 2 1 millimole/L  At this point, given the extensive workup which has been done and no appropriate medical condition identified as the cause of the patient's decreased potassium we feel that it is appropriate that the patient be transferred to a [place] level setting where physicians with more acquired expertise within the electrolyte field can more thoroughly examine the patient  Of note, it is my suspicion that the patient may be experiencing either an eating disorder, laxative use, or diuretic use that she may not be willing to disclose  On nearly every occasion that the patient presents, she also appears to be volume deplete and in some instances this could be associated with an alkalosis, other times is associated with an acidosis  This presentation at least hints at the possibility of an underlying abuse issue, or some other disorder that the patient may simply not be aware of and at this point we have not been able to identify  Patient will be restarted on her typical outpatient dosing of oral potassium for a total of 200 mEq daily, I will also provide an additional 20 mEq of IV potassium chloride x5 doses  Reassess potassium little bit later this evening  2  Acute kidney injury   This is most likely due to volume depletion, patient have isotonic IV fluids to supplement, check labs Level later this afternoon  3  History of acidosis   Bicarbonate Level appropriate, continue closely monitor    4  History of alkalosis   Once again bicarbonate level is appropriate, continue monitor  5  Hypomagnesemia   Patient has been stable on once daily magnesium oxide 400 mg, would continue with this medication at this time  6  History of hypophosphatemia   Phosphorus level was appropriate, continue monitor  Thank you for allowing us to participate in the care of your patient  Please feel free to contact us regarding the care of this patient, or any other questions/concerns that may be applicable  Patient Active Problem List   Diagnosis    Hypokalemia    History of gastric bypass    Migraine without aura and without status migrainosus, not intractable    Left-sided weakness    Hypophosphatemia    Anemia    Vitamin D deficiency    Weakness of both lower extremities    Weakness of both upper extremities    Elevated CPK    Vitamin B12 deficiency    Cervical lymphadenopathy    Generalized weakness    Paresthesia of both lower extremities    Paresthesias    B12 deficiency    Gastric bypass status for obesity    GERD (gastroesophageal reflux disease)    Migraine    WAGNER (obstructive sleep apnea)    Other intestinal malabsorption    Acute kidney injury ([facility] [address] )       History of Present Illness   Physician Requesting Consult: Karyna Hurst*  Reason for Consult / Principal Problem:  Hypokalemia  Hx and PE limited by:   HPI: Mayra  is a 28y o  year old female who presents to the emergency department with complaints of weakness and paresthesias along with the known issue of a low potassium  Patient's most recent labs were drawn on December 31st which noted a potassium level of 2 8 christopher Mol/L, patient's contacted this morning and she complained of feeling fatigued and felt as though her potassium was low  She was asked to go to the emergency department and upon stat recheck the potassium level was noted to be at 2 1 millimole/L    Patient is taking all medications as prescribed including 5 times a day 40 mEq of liquid potassium for a total of 200 mEq daily  She denies nausea, vomiting, diarrhea  She denies any use of any new medications  Patient has been taking spironolactone 50 mg p o  Daily  Magnesium oxide has also been taking once daily and her magnesium level here has been normal   She was recently up in [facility]) for vacation and admits to having 1 glass of wine during the entire stay  Patient is chronically on sodium bicarbonate tablet, 650 mg, which she takes twice daily  At this time the patient appears to have normal sinus rhythm on the EKG strip, she had potentially been describing some chest pressure/pain but during my questioning she denied any this specific issues  Her overwhelming complaint at this point is fatigue  History obtained from chart review and the patient    Review of Systems - Negative except as mentioned above in HPI, more specifics as mentioned below    Review of Systems - General ROS: positive for  - fatigue and malaise  Psychological ROS: negative  Ophthalmic ROS: negative  ENT ROS: negative  Allergy and Immunology ROS: negative  Hematological and Lymphatic ROS: negative  Endocrine ROS: negative  Respiratory ROS: no cough, shortness of breath, or wheezing  Cardiovascular ROS: no chest pain or dyspnea on exertion  Gastrointestinal ROS: no abdominal pain, change in bowel habits, or black or bloody stools  Genito-Urinary ROS: no dysuria, trouble voiding, or hematuria  Musculoskeletal ROS: positive for - muscular weakness  Neurological ROS: positive for - paresthesias  Dermatological ROS: negative    Historical Information   Past Medical History:   Diagnosis Date    H  pylori infection     Hypokalemia     Migraine      Past Surgical History:   Procedure Laterality Date    ABDOMINAL SURGERY      APPENDECTOMY      CHOLECYSTECTOMY      FL GUIDED CENTRAL VENOUS ACCESS REPLACEMENT  12/21/2018    GASTRIC BYPASS      HYSTERECTOMY      TUNNELED VENOUS PORT PLACEMENT N/A 12/21/2018    Procedure: INSERTION VENOUS PORT (PORT-A-CATH); Surgeon: Kimberley Hooker DO;  Location: [facility];  Service: General     Social History   History   Alcohol Use    Yes     Comment: socially     History   Drug Use No     History   Smoking Status    Never Smoker   Smokeless Tobacco    Never Used     Family History   Problem Relation Age of Onset    Hypertension Father     Diabetes Father     Diabetes Maternal Grandfather        Meds/Allergies   all current active meds have been reviewed, current meds: Current Facility-Administered Medications   Medication Dose Route Frequency    potassium chloride 10 % oral solution 40 mEq  40 mEq Oral 4x Daily    potassium chloride 20 mEq IVPB (premix)  20 mEq Intravenous Q2H    potassium chloride 20 mEq IVPB (premix)  40 mEq Intravenous Once    potassium chloride 20 mEq IVPB (premix)  40 mEq Intravenous Once    and PTA meds:    (Not in a hospital admission)      Allergies   Allergen Reactions    Nsaids      Other reaction(s): Other (Please comment)  Should not take s/p bariatric surgery       Objective   No intake or output data in the 24 hours ending 01/02/19 1233    Invasive Devices:        Physical Exam   Constitutional: She is oriented to person, place, and time  She appears well-developed  HENT:   Head: Normocephalic and atraumatic  Eyes: Conjunctivae are normal  Right eye exhibits no discharge  Left eye exhibits no discharge  Neck: Normal range of motion  Cardiovascular: Normal rate and regular rhythm  Pulmonary/Chest: Effort normal  No respiratory distress  She has no wheezes  Abdominal: Soft  Bowel sounds are normal    Musculoskeletal: She exhibits no edema or deformity  Neurological: She is alert and oriented to person, place, and time  No cranial nerve deficit  Skin: Skin is warm and dry  She is not diaphoretic  Psychiatric: She has a normal mood and affect  Her behavior is normal          No intake/output data recorded      Vitals:    01/02/19 1051   BP: 137/66 Pulse: 86   Resp: 16   Temp: (!) 97 2 °F (36 2 °C)   SpO2: 100%       Gen: in NAD, Alert/Awake  HEENT: no sclerous icterus, MMM, neck supple  CV: +S1/S2, RRR  Lungs: CTA bilaterally  Abd: +BS, soft NT/ND  Ext: all four extremities are warm  Skin: no rashes noted  Neuro: CN II-XII intact    Current Weight: Weight - Scale: 66 7 kg (147 lb 0 8 oz)  First Weight: Weight - Scale: 66 7 kg (147 lb 0 8 oz)    Lab Results:  I have personally reviewed pertinent labs  CBC: Lab Results   Component Value Date    WBC 7 43 01/02/2019    HGB 14 2 01/02/2019    HCT 41 8 01/02/2019    MCV 90 01/02/2019     01/02/2019    MCH 30 6 01/02/2019    MCHC 34 0 01/02/2019    RDW 14 3 01/02/2019    MPV 10 2 01/02/2019    NRBC 0 01/02/2019     CMP: Lab Results   Component Value Date    K 2 1 (LL) 01/02/2019     01/02/2019    CO2 25 01/02/2019    BUN 16 01/02/2019    CREATININE 1 04 01/02/2019    CALCIUM 8 5 01/02/2019    AST 22 01/02/2019    ALT 24 01/02/2019    ALKPHOS 54 01/02/2019    EGFR 70 01/02/2019     Phosphorus:   Lab Results   Component Value Date    PHOS 3 6 01/02/2019     Magnesium:   Lab Results   Component Value Date    MG 2 4 01/02/2019     Urinalysis: No results found for: [place], SPECGRAV, PHUR, LEUKOCYTESUR, NITRITE, PROTEINUA, GLUCOSEU, KETONESU, BILIRUBINUR, BLOODU  Ionized Calcium: No results found for: CAION  Coagulation: No results found for: PT, INR, APTT  Troponin: No results found for: TROPONINI  ABG: No results found for: PHART, CVI9MAO, PO2ART, REK3BJK, U9VDHIOS, BEART, SOURCE      Results from last 7 days  Lab Units 01/02/19  1119   POTASSIUM mmol/L 2 1*   CHLORIDE mmol/L 101   CO2 mmol/L 25   BUN mg/dL 16   CREATININE mg/dL 1 04   CALCIUM mg/dL 8 5   ALK PHOS U/L 54   ALT U/L 24   AST U/L 22       Radiology review:  No results found          Tian Cobos DO

## 2019-01-02 NOTE — ED PROVIDER NOTES
Pt Name: Denita Oconnor  MRN: [id number]  [place] 1985  Age/Sex: 28 y o  female  Date of evaluation: 1/2/2019  PCP: Tara Treviño [address] [place] [address]    Chief Complaint   Patient presents with    Abnormal Lab     Had K level done monday  Resulted at 2 8  C/o tinging in lhand , chest discomfort  Call Dr Sheri Peterson  Instructed to come to ED  HPI    Kenia Phillips presents to the Emergency Department after she was instructed by her nephrologist to come  She has had recurrent episodes of hypokalemia for which she has been hospitalized  HPI      Past Medical and Surgical History    Past Medical History:   Diagnosis Date    H  pylori infection     Hypokalemia     Migraine        Past Surgical History:   Procedure Laterality Date    ABDOMINAL SURGERY      APPENDECTOMY      CHOLECYSTECTOMY      FL GUIDED CENTRAL VENOUS ACCESS REPLACEMENT  12/21/2018    GASTRIC BYPASS      HYSTERECTOMY      TUNNELED VENOUS PORT PLACEMENT N/A 12/21/2018    Procedure: INSERTION VENOUS PORT (PORT-A-CATH); Surgeon: Estrellita Talavera DO;  Location: [facility];  Service: General       Family History   Problem Relation Age of Onset    Hypertension Father     Diabetes Father     Diabetes Maternal Grandfather        Social History   Substance Use Topics    Smoking status: Never Smoker    Smokeless tobacco: Never Used    Alcohol use Yes      Comment: socially           Allergies    Allergies   Allergen Reactions    Nsaids      Other reaction(s): Other (Please comment)  Should not take s/p bariatric surgery       Home Medications    Prior to Admission medications    Medication Sig Start Date End Date Taking?  Authorizing Provider   acetaminophen (TYLENOL) 325 mg tablet Take 2 tablets (650 mg total) by mouth every 6 (six) hours as needed for mild pain, headaches or fever 12/21/18   Selwyn Townsend DO   amitriptyline (ELAVIL) 50 mg tablet Take 50 mg by mouth daily at bedtime 8/27/18 8/27/19  Historical Provider, MD mathew complex-C-folic acid (NEPHRO-EDIE) 0 8 mg tablet Take 0 8 mg by mouth daily with dinner    Historical Provider, MD   ergocalciferol (ERGOCALCIFEROL) 68656 units capsule Take 50,000 Units by mouth once a week    Historical Provider, MD   ferrous sulfate 325 (65 Fe) mg tablet Take 325 mg by mouth 2 (two) times a day with meals    Historical Provider, MD   magnesium oxide (MAG-OX) 400 mg Take 1 tablet (400 mg total) by mouth daily 11/6/18   Chris Yin MD   meclizine (ANTIVERT) 25 mg tablet Take 25 mg by mouth daily at bedtime 8/27/18   Historical Provider, MD   oxyCODONE (ROXICODONE) 5 mg immediate release tablet Take 1 tablet (5 mg total) by mouth every 4 (four) hours as needed for moderate pain or severe pain Max Daily Amount: 30 mg 12/21/18   Brooks Rouse,    potassium chloride 10 % Take 30 mL (40 mEq total) by mouth 5 (five) times a day 12/21/18   Brooks Rouse DO   sodium bicarbonate 650 mg tablet Take 1 tablet (650 mg total) by mouth 2 (two) times a day 11/29/18   Debria Hatchet,    spironolactone (ALDACTONE) 50 mg tablet Take 1 tablet (50 mg total) by mouth daily 11/6/18   Chris Yin MD   thiamine 100 MG tablet Take 100 mg by mouth daily    Historical Provider, MD   zinc gluconate 50 mg tablet Take 50 mg by mouth daily    Historical Provider, MD           Review of Systems    Review of Systems   Constitutional: Negative for activity change, appetite change, chills, diaphoresis, fatigue and fever  HENT: Negative for congestion, postnasal drip, rhinorrhea, sinus pressure, sneezing and sore throat  Eyes: Negative for pain and visual disturbance  Respiratory: Negative for cough, chest tightness and shortness of breath  Cardiovascular: Negative for chest pain, palpitations and leg swelling  Gastrointestinal: Negative for abdominal distention, abdominal pain, constipation, diarrhea, nausea and vomiting  Endocrine: Negative for polydipsia, polyphagia and polyuria     Genitourinary: Negative for decreased urine volume, difficulty urinating, dysuria, flank pain, frequency and hematuria  Musculoskeletal: Negative for arthralgias, gait problem, joint swelling and neck pain  Skin: Negative for pallor and rash  Allergic/Immunologic: Negative for immunocompromised state  Neurological: Negative for syncope, speech difficulty, weakness, light-headedness, numbness and headaches  All other systems reviewed and are negative  Physical Exam      ED Triage Vitals   Temperature Pulse Respirations Blood Pressure SpO2   01/02/19 1051 01/02/19 1051 01/02/19 1051 01/02/19 1051 01/02/19 1051   (!) 97 2 °F (36 2 °C) 86 16 137/66 100 %      Temp Source Heart Rate Source Patient Position - Orthostatic VS BP Location FiO2 (%)   01/02/19 1051 01/02/19 1230 01/02/19 1230 01/02/19 1230 --   Temporal Monitor Sitting Left arm       Pain Score       01/02/19 1051       No Pain               Physical Exam   Constitutional: She is oriented to person, place, and time  She appears well-developed and well-nourished  No distress  HENT:   Head: Normocephalic and atraumatic  Nose: Nose normal    Mouth/Throat: Oropharynx is clear and moist    Eyes: Pupils are equal, round, and reactive to light  Conjunctivae, EOM and lids are normal    Neck: Normal range of motion  Neck supple  Cardiovascular: Normal rate, regular rhythm and normal heart sounds  Exam reveals no gallop and no friction rub  No murmur heard  Pulmonary/Chest: Effort normal and breath sounds normal  No accessory muscle usage  No respiratory distress  She has no wheezes  She has no rales  Abdominal: Soft  She exhibits no distension  There is no tenderness  There is no rebound and no guarding  Neurological: She is alert and oriented to person, place, and time  No cranial nerve deficit or sensory deficit  Skin: Skin is warm and dry  No rash noted  She is not diaphoretic  No erythema  Psychiatric: She has a normal mood and affect   Her speech is normal and behavior is normal  Judgment and thought content normal    Nursing note and vitals reviewed  Assessment and Plan    Eveline Chandra is a 28 y o  female who presents with hypokalemia  Physical examination unremarkable  Plan will be to perform diagnostic testing and treat symptomatically  MDM    Diagnostic Results      EKG INTERPRETATION  EKG Interpretation    Rhythm: sinus  Axis: normal  T waves: abnormal  ST segments: abnormal    Impression: nonspecific EKG  EKG for comparison: not immediately available  EKG interpreted by me  Interpretation by [facility] [employer], DO  EKG reviewed and interpreted independently      Labs:    Results for orders placed or performed during the hospital encounter of 01/02/19   CBC and differential   Result Value Ref Range    WBC 7 43 4 31 - 10 16 Thousand/uL    RBC 4 64 3 81 - 5 12 Million/uL    Hemoglobin 14 2 11 5 - 15 4 g/dL    Hematocrit 41 8 34 8 - 46 1 %    MCV 90 82 - 98 fL    MCH 30 6 26 8 - 34 3 pg    MCHC 34 0 31 4 - 37 4 g/dL    RDW 14 3 11 6 - 15 1 %    MPV 10 2 8 9 - 12 7 fL    Platelets 456 323 - 309 Thousands/uL    nRBC 0 /100 WBCs    Neutrophils Relative 60 43 - 75 %    Immat GRANS % 0 0 - 2 %    Lymphocytes Relative 28 14 - 44 %    Monocytes Relative 9 4 - 12 %    Eosinophils Relative 2 0 - 6 %    Basophils Relative 1 0 - 1 %    Neutrophils Absolute 4 53 1 85 - 7 62 Thousands/µL    Immature Grans Absolute 0 02 0 00 - 0 20 Thousand/uL    Lymphocytes Absolute 2 08 0 60 - 4 47 Thousands/µL    Monocytes Absolute 0 64 0 17 - 1 22 Thousand/µL    Eosinophils Absolute 0 12 0 00 - 0 61 Thousand/µL    Basophils Absolute 0 04 0 00 - 0 10 Thousands/µL   Comprehensive metabolic panel   Result Value Ref Range    Sodium 138 136 - 145 mmol/L    Potassium 2 1 (LL) 3 5 - 5 3 mmol/L    Chloride 101 100 - 108 mmol/L    CO2 25 21 - 32 mmol/L    ANION GAP 12 4 - 13 mmol/L    BUN 16 5 - 25 mg/dL    Creatinine 1 04 0 60 - 1 30 mg/dL    Glucose 87 65 - 140 mg/dL Calcium 8 5 8 3 - 10 1 mg/dL    AST 22 5 - 45 U/L    ALT 24 12 - 78 U/L    Alkaline Phosphatase 54 46 - 116 U/L    Total Protein 7 8 6 4 - 8 2 g/dL    Albumin 3 8 3 5 - 5 0 g/dL    Total Bilirubin 0 40 0 20 - 1 00 mg/dL    eGFR 70 ml/min/1 73sq m   Phosphorus   Result Value Ref Range    Phosphorus 3 6 2 7 - 4 5 mg/dL   Magnesium   Result Value Ref Range    Magnesium 2 4 1 6 - 2 6 mg/dL       All labs reviewed and utilized in the medical decision making process    Radiology:    No orders to display       All radiology studies independently viewed by me and interpreted by the radiologist     Procedure    Procedures    CritCare Time      ED Course of Care and Re-Assessments    Medications   potassium chloride 20 mEq IVPB (premix) (20 mEq Intravenous New Bag 1/2/19 1302)   potassium chloride 10 % oral solution 40 mEq (40 mEq Oral Given 1/2/19 1258)   potassium chloride 20 mEq IVPB (premix) (not administered)   potassium chloride 20 mEq IVPB (premix) (not administered)   sodium chloride 0 9 % infusion (not administered)   sodium chloride 0 9 % bolus 1,000 mL (1,000 mL Intravenous New Bag 1/2/19 1301)   amitriptyline (ELAVIL) tablet 50 mg (not administered)   b complex-vitamin C-folic acid (NEPHROCAPS) capsule 1 capsule (not administered)   ferrous sulfate tablet 325 mg (not administered)   magnesium oxide (MAG-OX) tablet 400 mg (not administered)   thiamine (VITAMIN B1) tablet 100 mg (not administered)   zinc gluconate tablet 50 mg (not administered)   ondansetron (ZOFRAN) injection 4 mg (not administered)   enoxaparin (LOVENOX) subcutaneous injection 40 mg (not administered)       I spoke with Dr Margot Monte as well as Dr Emerita Thomson from nephrology  She will need subspecialty referral to a tertiary center for management       FINAL IMPRESSION    Final diagnoses:   Chronic hypokalemia         DISPOSITION/PLAN    Time reflects when diagnosis was documented in both MDM as applicable and the Disposition within this note Time User Action Codes Description Comment    1/2/2019 12:16 PM Fco Locke Add [E87 6] Chronic hypokalemia     1/2/2019 12:16 PM Milady Carr Add [E87 6] Hypokalemia     1/2/2019 12:16 PM Milady aCrr Modify [E87 6] Hypokalemia       ED Disposition     ED Disposition Condition Comment    Admit  Case was discussed with NASRA and the patient's admission status was agreed to be Admission Status: inpatient status to the service of Dr Elmira Cevallos   Follow-up Information    None           PATIENT REFERRED TO:    No follow-up provider specified  DISCHARGE MEDICATIONS:    Current Discharge Medication List      START taking these medications    Details   enoxaparin (LOVENOX) 40 mg/0 4 mL Inject 0 4 mL (40 mg total) under the skin every 24 hours  Refills: 0    Comments: For DVT Prophylaxis  Associated Diagnoses: Hypokalemia         CONTINUE these medications which have CHANGED    Details   acetaminophen (TYLENOL) 325 mg tablet Take 2 tablets (650 mg total) by mouth every 6 (six) hours as needed for mild pain, headaches or fever  Refills: 0    Comments: Do not exceed a total of 3 grams of tylenol/acetaminophen in a 24-hour period    Associated Diagnoses: Hypokalemia         CONTINUE these medications which have NOT CHANGED    Details   amitriptyline (ELAVIL) 50 mg tablet Take 50 mg by mouth daily at bedtime      b complex-C-folic acid (NEPHRO-EDIE) 0 8 mg tablet Take 0 8 mg by mouth daily with dinner      ergocalciferol (ERGOCALCIFEROL) 33331 units capsule Take 50,000 Units by mouth once a week Patient takes this med on Mondays       ferrous sulfate 325 (65 Fe) mg tablet Take 325 mg by mouth 2 (two) times a day with meals      magnesium oxide (MAG-OX) 400 mg Take 1 tablet (400 mg total) by mouth daily  Qty: 30 tablet, Refills: 0    Associated Diagnoses: Hypokalemia      meclizine (ANTIVERT) 25 mg tablet Take 25 mg by mouth daily at bedtime      potassium chloride 10 % Take 30 mL (40 mEq total) by mouth 5 (five) times a day  Qty: 900 mL, Refills: 0    Associated Diagnoses: Hypokalemia      sodium bicarbonate 650 mg tablet Take 1 tablet (650 mg total) by mouth 2 (two) times a day  Qty: 60 tablet, Refills: 0    Associated Diagnoses: Hypokalemia      thiamine 100 MG tablet Take 100 mg by mouth daily      zinc gluconate 50 mg tablet Take 50 mg by mouth daily         STOP taking these medications       spironolactone (ALDACTONE) 50 mg tablet Comments:   Reason for Stopping:                 Outpatient Discharge Orders  Discharge Hanny Cuenca, DO Anh Cuenca DO  01/02/19 1437

## 2019-01-02 NOTE — ASSESSMENT & PLAN NOTE
· The patient does not have a CPAP machine at home  · She needs an outpatient sleep study as soon as possible

## 2019-01-02 NOTE — ASSESSMENT & PLAN NOTE
· Likely prerenal azotemia  · NSS IV fluids  · Hold spironolactone  · Avoid all nephrotoxic agents  · Monitor for urinary retention  · Serial laboratory testing to monitor her renal function and electrolytes

## 2019-01-03 VITALS
OXYGEN SATURATION: 99 % | BODY MASS INDEX: 28.16 KG/M2 | SYSTOLIC BLOOD PRESSURE: 98 MMHG | WEIGHT: 158.95 LBS | DIASTOLIC BLOOD PRESSURE: 57 MMHG | TEMPERATURE: 97.9 F | RESPIRATION RATE: 16 BRPM | HEART RATE: 80 BPM | HEIGHT: 63 IN

## 2019-01-03 LAB
ATRIAL RATE: 94 BPM
P AXIS: 47 DEGREES
PR INTERVAL: 130 MS
QRS AXIS: 35 DEGREES
QRSD INTERVAL: 100 MS
QT INTERVAL: 352 MS
QTC INTERVAL: 440 MS
T WAVE AXIS: 39 DEGREES
VENTRICULAR RATE: 94 BPM

## 2019-01-03 PROCEDURE — 93010 ELECTROCARDIOGRAM REPORT: CPT | Performed by: INTERNAL MEDICINE

## 2019-01-03 RX ORDER — LORAZEPAM 2 MG/ML
0.5 INJECTION INTRAMUSCULAR ONCE
Status: COMPLETED | OUTPATIENT
Start: 2019-01-03 | End: 2019-01-03

## 2019-01-03 RX ADMIN — LORAZEPAM 0.5 MG: 2 INJECTION, SOLUTION INTRAMUSCULAR; INTRAVENOUS at 04:16

## 2019-01-03 NOTE — UTILIZATION REVIEW
Initial Clinical Review  Admission: Date/Time/Statement: 1/2/19 @ 1216   Orders Placed This Encounter   Procedures    Inpatient Admission     Standing Status:   Standing     Number of Occurrences:   1     Order Specific Question:   Admitting Physician     Answer:   Kera Rebollar     Order Specific Question:   Level of Care     Answer:   Med Surg [16]     Order Specific Question:   Estimated length of stay     Answer:   More than 2 Midnights     Order Specific Question:   Certification     Answer:   I certify that inpatient services are medically necessary for this patient for a duration of greater than two midnights  See H&P and MD Progress Notes for additional information about the patient's course of treatment  ED: Date/Time/Mode of Arrival:   ED Arrival Information     Expected Arrival Acuity Means of Arrival Escorted By Service Admission Type    - 1/2/2019 10:37 Urgent Walk-In Self General Medicine Urgent    Arrival Complaint    tingling in hands feet face      Chief Complaint:   Chief Complaint   Patient presents with    Abnormal Lab     Had K level done monday  Resulted at 2 8  C/o tinging in lhand , chest discomfort  Call Dr Phillip Antoine  Instructed to come to ED  History of Illness:   28 y o  female who presents to the emergency department after being found to have hypokalemia on outpatient laboratory blood testing  The patient was instructed by her outpatient Nephrologist, Dr Vidal Forde, to present to the emergency department  The patient has had multiple recent hospitalization for recurrent, severe hypokalemia  Over the last few days, she developed worsening paresthesias involving all 4 extremities as well as involving her face  In addition, the patient has been experiencing chest pain and palpitations  Nothing seemed to improve her symptoms    ED Vital Signs:   ED Triage Vitals   Temperature Pulse Respirations Blood Pressure SpO2   01/02/19 1051 01/02/19 1051 01/02/19 1051 01/02/19 1051 01/02/19 1051   (!) 97 2 °F (36 2 °C) 86 16 137/66 100 %      Temp Source Heart Rate Source Patient Position - Orthostatic VS BP Location FiO2 (%)   01/02/19 1051 01/02/19 1230 01/02/19 1230 01/02/19 1230 --   Temporal Monitor Sitting Left arm       Pain Score       01/02/19 1051       No Pain        Wt Readings from Last 1 Encounters:   01/02/19 72 1 kg (158 lb 15 2 oz)   Vital Signs (abnormal): Abnormal Labs/Diagnostic Test Results:   K 2 1   EKG=Normal sinus rhythm  Nonspecific ST and T wave abnormality  Abnormal ECG  When compared with ECG of 18-DEC-2018 13:28,  T wave inversion now evident in Inferior leads  T wave inversion now evident in Lateral leads  ED Treatment:   Medication Administration from 01/02/2019 1037 to 01/02/2019 1325       Date/Time Order Dose Route Action Action by Comments     01/02/2019 1302 potassium chloride 20 mEq IVPB (premix) 20 mEq Intravenous New Bag Rylee Gilles Manus, RN      01/02/2019 1258 potassium chloride 10 % oral solution 40 mEq 40 mEq Oral Given Rylee Gilles Manus, RN      01/02/2019 1301 sodium chloride 0 9 % bolus 1,000 mL 1,000 mL Intravenous New Bag Rylee Gilles Manus, RN       Past Medical/Surgical History:    Active Ambulatory Problems     Diagnosis Date Noted    Hypokalemia 07/07/2018    History of gastric bypass 07/07/2018    Migraine without aura and without status migrainosus, not intractable 07/07/2018    Left-sided weakness 07/07/2018    Hypophosphatemia 07/09/2018    Anemia 07/10/2018    Vitamin D deficiency 07/10/2018    Weakness of both lower extremities 07/11/2018    Weakness of both upper extremities 07/11/2018    Elevated CPK 07/11/2018    Vitamin B12 deficiency 07/11/2018    Cervical lymphadenopathy 07/11/2018    Generalized weakness 07/12/2018    Paresthesia of both lower extremities 08/15/2018    Paresthesias 08/15/2018    B12 deficiency 01/17/2016    Gastric bypass status for obesity 10/02/2018    GERD (gastroesophageal reflux disease) 09/03/2018    Migraine 05/29/2014    WAGNER (obstructive sleep apnea) 06/11/2014    Other intestinal malabsorption 10/05/2018    Acute kidney injury ([facility] [address] ) 12/18/2018     Resolved Ambulatory Problems     Diagnosis Date Noted    Non-traumatic rhabdomyolysis 07/07/2018    Transaminitis 07/07/2018    Hypocalcemia 07/09/2018    Hypernatremia 07/09/2018     Past Medical History:   Diagnosis Date    H  pylori infection     Hypokalemia     Migraine    Admitting Diagnosis: Hypokalemia [E87 6]  Chronic hypokalemia [E87 6]  Abnormal laboratory test result [R89 9]  Age/Sex: 28 y o  female  Assessment/Plan:   Hypokalemia   Assessment & Plan     · Admit to med/surg level of care with telemetry monitoring  · Nephrology evaluated the patient in consultation  · IV and PO potassium chloride supplementation per Nephrology's recommendations  · Follow the potassium level and magnesium level      Acute kidney injury ([facility] [address] )   Assessment & Plan     · Likely prerenal azotemia  · NSS IV fluids  · Hold spironolactone  · Avoid all nephrotoxic agents  · Monitor for urinary retention  · Serial laboratory testing to monitor her renal function and electrolytes   Admission Orders:  TELEMETRY  CONSULT RENAL  ACCESS PORT-A-CATH  Scheduled Meds:   IV K 20 mEq Q2H ( RECEIVED X5 DOSES)  IV K 40 mEq X2  PO K X4  Continuous Infusions:  IVF NS @ 125CC/HR   PRN Meds:   IV ZOFRAN     The patient requires transfer to a higher level of care to see an electrolyte specialist with her multiple recent hospitalizations for severe, recurrent hypokalemia  The patient will be transferred to [facility] on the service of Dr Mehran Peraza [facility] attending physician)  [address] [facility] Review Department  Phone: [phone]; Fax [phone]  [email or web address]  ATTENTION: Please call with any questions or concerns to [phone]  and carefully listen to the prompts so that you are directed to the right person  Send all requests for admission clinical reviews, approved or denied determinations and any other requests to fax [phone]   All voicemails are confidential

## 2019-01-03 NOTE — PROGRESS NOTES
Report called to Rhett at [facility]  Patient aware of 0030 pickup time and states that she notified her family

## 2019-01-03 NOTE — NURSING NOTE
Patient given 0 5mg Ativan for anxiety before transfer by [facility]  Vital signs were stable, report given to receiving facility  Patient has NSS 0 9% running at 125 ml/hr  Patient offered no complaints at this time

## 2019-01-17 ENCOUNTER — HOSPITAL ENCOUNTER (OUTPATIENT)
Dept: INFUSION CENTER | Facility: HOSPITAL | Age: 34
End: 2019-01-17

## 2019-01-24 ENCOUNTER — HOSPITAL ENCOUNTER (OUTPATIENT)
Dept: INFUSION CENTER | Facility: HOSPITAL | Age: 34
Discharge: HOME | End: 2019-01-24

## 2019-01-31 ENCOUNTER — HOSPITAL ENCOUNTER (EMERGENCY)
Facility: HOSPITAL | Age: 34
Discharge: HOME | End: 2019-01-31
Attending: EMERGENCY MEDICINE | Admitting: EMERGENCY MEDICINE
Payer: COMMERCIAL

## 2019-01-31 ENCOUNTER — APPOINTMENT (EMERGENCY)
Dept: RADIOLOGY | Facility: HOSPITAL | Age: 34
End: 2019-01-31
Payer: COMMERCIAL

## 2019-01-31 ENCOUNTER — HOSPITAL ENCOUNTER (OUTPATIENT)
Dept: INFUSION CENTER | Facility: HOSPITAL | Age: 34
Discharge: HOME | End: 2019-01-31
Payer: COMMERCIAL

## 2019-01-31 ENCOUNTER — APPOINTMENT (EMERGENCY)
Dept: CT IMAGING | Facility: HOSPITAL | Age: 34
End: 2019-01-31
Payer: COMMERCIAL

## 2019-01-31 VITALS
TEMPERATURE: 97.7 F | RESPIRATION RATE: 13 BRPM | SYSTOLIC BLOOD PRESSURE: 96 MMHG | DIASTOLIC BLOOD PRESSURE: 54 MMHG | HEART RATE: 83 BPM | OXYGEN SATURATION: 100 % | BODY MASS INDEX: 27.92 KG/M2 | WEIGHT: 157.63 LBS

## 2019-01-31 VITALS
OXYGEN SATURATION: 100 % | TEMPERATURE: 97.8 F | DIASTOLIC BLOOD PRESSURE: 56 MMHG | RESPIRATION RATE: 18 BRPM | SYSTOLIC BLOOD PRESSURE: 133 MMHG | HEART RATE: 96 BPM

## 2019-01-31 DIAGNOSIS — E87.6 HYPOKALEMIA: Primary | ICD-10-CM

## 2019-01-31 LAB
ALBUMIN SERPL BCP-MCNC: 3.6 G/DL (ref 3.5–5)
ALP SERPL-CCNC: 46 U/L (ref 46–116)
ALT SERPL W P-5'-P-CCNC: 28 U/L (ref 12–78)
ANION GAP SERPL CALCULATED.3IONS-SCNC: 10 MMOL/L (ref 4–13)
ANION GAP SERPL CALCULATED.3IONS-SCNC: 12 MMOL/L (ref 4–13)
APTT PPP: 30 SECONDS (ref 26–38)
AST SERPL W P-5'-P-CCNC: 21 U/L (ref 5–45)
BASOPHILS # BLD AUTO: 0.07 THOUSANDS/ΜL (ref 0–0.1)
BASOPHILS NFR BLD AUTO: 1 % (ref 0–1)
BILIRUB SERPL-MCNC: 0.3 MG/DL (ref 0.2–1)
BUN SERPL-MCNC: 14 MG/DL (ref 5–25)
BUN SERPL-MCNC: 15 MG/DL (ref 5–25)
CALCIUM SERPL-MCNC: 8.1 MG/DL (ref 8.3–10.1)
CALCIUM SERPL-MCNC: 8.2 MG/DL (ref 8.3–10.1)
CHLORIDE SERPL-SCNC: 107 MMOL/L (ref 100–108)
CHLORIDE SERPL-SCNC: 108 MMOL/L (ref 100–108)
CO2 SERPL-SCNC: 21 MMOL/L (ref 21–32)
CO2 SERPL-SCNC: 23 MMOL/L (ref 21–32)
CREAT SERPL-MCNC: 0.98 MG/DL (ref 0.6–1.3)
CREAT SERPL-MCNC: 1 MG/DL (ref 0.6–1.3)
DEPRECATED D DIMER PPP: 477 NG/ML (FEU)
EOSINOPHIL # BLD AUTO: 0.09 THOUSAND/ΜL (ref 0–0.61)
EOSINOPHIL NFR BLD AUTO: 1 % (ref 0–6)
ERYTHROCYTE [DISTWIDTH] IN BLOOD BY AUTOMATED COUNT: 13.8 % (ref 11.6–15.1)
EXT PREG TEST URINE: NEGATIVE
GFR SERPL CREATININE-BSD FRML MDRD: 74 ML/MIN/1.73SQ M
GFR SERPL CREATININE-BSD FRML MDRD: 76 ML/MIN/1.73SQ M
GLUCOSE SERPL-MCNC: 90 MG/DL (ref 65–140)
GLUCOSE SERPL-MCNC: 97 MG/DL (ref 65–140)
HCT VFR BLD AUTO: 37 % (ref 34.8–46.1)
HGB BLD-MCNC: 11.9 G/DL (ref 11.5–15.4)
IMM GRANULOCYTES # BLD AUTO: 0.02 THOUSAND/UL (ref 0–0.2)
IMM GRANULOCYTES NFR BLD AUTO: 0 % (ref 0–2)
INR PPP: 0.97 (ref 0.86–1.17)
LYMPHOCYTES # BLD AUTO: 1.18 THOUSANDS/ΜL (ref 0.6–4.47)
LYMPHOCYTES NFR BLD AUTO: 14 % (ref 14–44)
MAGNESIUM SERPL-MCNC: 2.3 MG/DL (ref 1.6–2.6)
MCH RBC QN AUTO: 31.1 PG (ref 26.8–34.3)
MCHC RBC AUTO-ENTMCNC: 32.2 G/DL (ref 31.4–37.4)
MCV RBC AUTO: 97 FL (ref 82–98)
MONOCYTES # BLD AUTO: 0.53 THOUSAND/ΜL (ref 0.17–1.22)
MONOCYTES NFR BLD AUTO: 6 % (ref 4–12)
NEUTROPHILS # BLD AUTO: 6.61 THOUSANDS/ΜL (ref 1.85–7.62)
NEUTS SEG NFR BLD AUTO: 78 % (ref 43–75)
NRBC BLD AUTO-RTO: 0 /100 WBCS
PLATELET # BLD AUTO: 298 THOUSANDS/UL (ref 149–390)
PMV BLD AUTO: 10.1 FL (ref 8.9–12.7)
POTASSIUM SERPL-SCNC: 3 MMOL/L (ref 3.5–5.3)
POTASSIUM SERPL-SCNC: 3.3 MMOL/L (ref 3.5–5.3)
PROT SERPL-MCNC: 7.1 G/DL (ref 6.4–8.2)
PROTHROMBIN TIME: 12.4 SECONDS (ref 11.8–14.2)
RBC # BLD AUTO: 3.83 MILLION/UL (ref 3.81–5.12)
SODIUM SERPL-SCNC: 140 MMOL/L (ref 136–145)
SODIUM SERPL-SCNC: 141 MMOL/L (ref 136–145)
TROPONIN I SERPL-MCNC: <0.02 NG/ML
TROPONIN I SERPL-MCNC: <0.02 NG/ML
TSH SERPL DL<=0.05 MIU/L-ACNC: 1.19 UIU/ML (ref 0.36–3.74)
WBC # BLD AUTO: 8.5 THOUSAND/UL (ref 4.31–10.16)

## 2019-01-31 PROCEDURE — 93005 ELECTROCARDIOGRAM TRACING: CPT

## 2019-01-31 PROCEDURE — 99284 EMERGENCY DEPT VISIT MOD MDM: CPT

## 2019-01-31 PROCEDURE — 84443 ASSAY THYROID STIM HORMONE: CPT | Performed by: EMERGENCY MEDICINE

## 2019-01-31 PROCEDURE — 84484 ASSAY OF TROPONIN QUANT: CPT | Performed by: EMERGENCY MEDICINE

## 2019-01-31 PROCEDURE — 71046 X-RAY EXAM CHEST 2 VIEWS: CPT

## 2019-01-31 PROCEDURE — 85610 PROTHROMBIN TIME: CPT | Performed by: EMERGENCY MEDICINE

## 2019-01-31 PROCEDURE — 70450 CT HEAD/BRAIN W/O DYE: CPT

## 2019-01-31 PROCEDURE — 85379 FIBRIN DEGRADATION QUANT: CPT | Performed by: EMERGENCY MEDICINE

## 2019-01-31 PROCEDURE — 85025 COMPLETE CBC W/AUTO DIFF WBC: CPT | Performed by: EMERGENCY MEDICINE

## 2019-01-31 PROCEDURE — 80048 BASIC METABOLIC PNL TOTAL CA: CPT | Performed by: EMERGENCY MEDICINE

## 2019-01-31 PROCEDURE — 96365 THER/PROPH/DIAG IV INF INIT: CPT

## 2019-01-31 PROCEDURE — 80053 COMPREHEN METABOLIC PANEL: CPT | Performed by: EMERGENCY MEDICINE

## 2019-01-31 PROCEDURE — 81025 URINE PREGNANCY TEST: CPT | Performed by: EMERGENCY MEDICINE

## 2019-01-31 PROCEDURE — 85730 THROMBOPLASTIN TIME PARTIAL: CPT | Performed by: EMERGENCY MEDICINE

## 2019-01-31 PROCEDURE — 83735 ASSAY OF MAGNESIUM: CPT | Performed by: EMERGENCY MEDICINE

## 2019-01-31 PROCEDURE — 96366 THER/PROPH/DIAG IV INF ADDON: CPT

## 2019-01-31 PROCEDURE — 36415 COLL VENOUS BLD VENIPUNCTURE: CPT | Performed by: EMERGENCY MEDICINE

## 2019-01-31 RX ORDER — SODIUM CHLORIDE 9 MG/ML
20 INJECTION, SOLUTION INTRAVENOUS CONTINUOUS
Status: DISPENSED | OUTPATIENT
Start: 2019-01-31 | End: 2019-01-31

## 2019-01-31 RX ADMIN — POTASSIUM CHLORIDE: 2 INJECTION, SOLUTION, CONCENTRATE INTRAVENOUS at 09:21

## 2019-01-31 RX ADMIN — Medication 300 UNITS: at 16:34

## 2019-01-31 NOTE — ED NOTES
The potassium that was infusing when patient arrived to ED is complete at this time  Potasium infusion was started while the patient was in the infusion center this morning       Haile Maddox RN  01/31/19 0692

## 2019-01-31 NOTE — PROGRESS NOTES
Patient states she has a heaviness on her chest rating 6/10, which is her normal when her potassium is low  Patient states her hands cramp and usually resolves in a few minutes  Per patient this is normal   I explained to the patient that if it does not relieve in 15 minutes that I would be transferring her down to the ED

## 2019-01-31 NOTE — PROGRESS NOTES
Spoke with Dr Greg Velasquez, patient should be evaluated by ED, her potassium is most likely in the 2 range  Explained to patient she should be seen in the ED, patient agreeable  Report given to José Luis Walker RN in ED  Patient taken to ED with fluids infusing

## 2019-01-31 NOTE — PLAN OF CARE
Problem: METABOLIC, FLUID AND ELECTROLYTES - ADULT  Goal: Electrolytes maintained within normal limits  INTERVENTIONS:  - Monitor labs and assess patient for signs and symptoms of electrolyte imbalances  - Administer electrolyte replacement as ordered  - Monitor response to electrolyte replacements, including repeat lab results as appropriate  - Instruct patient on fluid and nutrition as appropriate   Outcome: Progressing      Problem: Knowledge Deficit  Goal: Patient/family/caregiver demonstrates understanding of disease process, treatment plan, medications, and discharge instructions  Complete learning assessment and assess knowledge base    Interventions:  - Provide teaching at level of understanding  - Provide teaching via preferred learning methods   Outcome: Progressing

## 2019-01-31 NOTE — ED PROVIDER NOTES
History  Chief Complaint   Patient presents with    Abnormal Lab     pt brought from infusion center with numbness of her face today,  here for potassium infusion  dr weber pt evaluated for low potassium     26-year-old female presents from the infusion center after she began last night with chest pain shortness of breath and numbness of her face and extremities  Patient was actively being infused potassium 60 mEq in the infusion center when she was sent down for evaluation after explaining the symptoms  Patient states that her potassium was just checked on Monday and was 3 0  Patient states that she has had similar symptoms of paresthesias and chest tightness with hypokalemia in the past   Again she states the symptoms started yesterday evening  Patient was to have potassium infusion last week but she states she missed it due to scheduling error"         History provided by:  Patient  Chest Pain   Pain location:  Substernal area and L chest  Pain quality: dull and pressure    Pain radiates to:  Does not radiate  Pain severity:  Mild  Onset quality:  Gradual  Duration:  1 day  Timing:  Constant  Progression:  Waxing and waning  Chronicity:  New  Context: breathing    Context: no drug use, not eating, not lifting and no movement    Relieved by:  Nothing  Worsened by:  Nothing tried  Ineffective treatments:  None tried  Associated symptoms: no abdominal pain, no AICD problem, no altered mental status, no anorexia, no anxiety, no back pain, no dizziness and no headache    Risk factors: no aortic disease and no birth control        Prior to Admission Medications   Prescriptions Last Dose Informant Patient Reported?  Taking?   acetaminophen (TYLENOL) 325 mg tablet More than a month at Unknown time  No No   Sig: Take 2 tablets (650 mg total) by mouth every 6 (six) hours as needed for mild pain, headaches or fever   amitriptyline (ELAVIL) 50 mg tablet 1/30/2019 at Unknown time  Yes Yes   Sig: Take 50 mg by mouth daily at bedtime   b complex-C-folic acid (NEPHRO-EDIE) 0 8 mg tablet 1/31/2019 at Unknown time  Yes Yes   Sig: Take 0 8 mg by mouth daily with dinner   enoxaparin (LOVENOX) 40 mg/0 4 mL Past Month at Unknown time  No Yes   Sig: Inject 0 4 mL (40 mg total) under the skin every 24 hours   ergocalciferol (ERGOCALCIFEROL) 20952 units capsule Past Week at Unknown time  Yes Yes   Sig: Take 50,000 Units by mouth once a week Patient takes this med on Mondays    ferrous sulfate 325 (65 Fe) mg tablet 1/31/2019 at Unknown time  Yes Yes   Sig: Take 325 mg by mouth 2 (two) times a day with meals   magnesium oxide (MAG-OX) 400 mg 1/31/2019 at Unknown time  No Yes   Sig: Take 1 tablet (400 mg total) by mouth daily   meclizine (ANTIVERT) 25 mg tablet 1/30/2019 at Unknown time  Yes Yes   Sig: Take 25 mg by mouth daily at bedtime   potassium chloride 10 % 1/31/2019 at Unknown time  No Yes   Sig: Take 30 mL (40 mEq total) by mouth 5 (five) times a day   sodium bicarbonate 650 mg tablet 1/31/2019 at Unknown time  No Yes   Sig: Take 1 tablet (650 mg total) by mouth 2 (two) times a day   thiamine 100 MG tablet 1/31/2019 at Unknown time  Yes Yes   Sig: Take 100 mg by mouth daily   zinc gluconate 50 mg tablet 1/31/2019 at Unknown time  Yes Yes   Sig: Take 50 mg by mouth daily      Facility-Administered Medications: None       Past Medical History:   Diagnosis Date    H  pylori infection     Hypokalemia     Migraine        Past Surgical History:   Procedure Laterality Date    ABDOMINAL SURGERY      APPENDECTOMY      CHOLECYSTECTOMY      FL GUIDED CENTRAL VENOUS ACCESS REPLACEMENT  12/21/2018    GASTRIC BYPASS      HYSTERECTOMY      TUNNELED VENOUS PORT PLACEMENT N/A 12/21/2018    Procedure: INSERTION VENOUS PORT (PORT-A-CATH);   Surgeon: Carlee Owen DO;  Location: [facility];  Service: General       Family History   Problem Relation Age of Onset    Hypertension Father     Diabetes Father     Diabetes Maternal Grandfather      I have reviewed and agree with the history as documented  Social History   Substance Use Topics    Smoking status: Never Smoker    Smokeless tobacco: Never Used    Alcohol use Yes      Comment: socially        Review of Systems   Constitutional: Negative for activity change, appetite change and chills  HENT: Negative for congestion, dental problem, drooling and ear discharge  Eyes: Negative for pain, discharge and itching  Respiratory: Negative for apnea and chest tightness  Cardiovascular: Positive for chest pain  Gastrointestinal: Negative for abdominal pain and anorexia  Endocrine: Negative for cold intolerance and heat intolerance  Genitourinary: Negative for difficulty urinating, dyspareunia and dysuria  Musculoskeletal: Negative for arthralgias, back pain, gait problem and joint swelling  Skin: Negative for color change and pallor  Allergic/Immunologic: Negative for environmental allergies and food allergies  Neurological: Negative for dizziness, facial asymmetry, light-headedness and headaches  Hematological: Negative for adenopathy  Psychiatric/Behavioral: Negative for behavioral problems and confusion  All other systems reviewed and are negative  Physical Exam  Physical Exam   Constitutional: She appears well-developed and well-nourished  HENT:   Head: Normocephalic  Right Ear: External ear normal    Left Ear: External ear normal    Eyes: Pupils are equal, round, and reactive to light  Right eye exhibits no discharge  Left eye exhibits no discharge  Neck: Normal range of motion  No JVD present  No tracheal deviation present  No thyromegaly present  Cardiovascular: Normal rate, regular rhythm and normal heart sounds  Pulmonary/Chest: Effort normal  No respiratory distress  She has no wheezes  She has no rales  Abdominal: Soft  She exhibits no distension  There is no tenderness  There is no guarding  Musculoskeletal: Normal range of motion   She exhibits no edema, tenderness or deformity  Neurological: She is alert  She displays normal reflexes  No cranial nerve deficit  Coordination normal    Skin: Skin is warm  Capillary refill takes less than 2 seconds  No erythema  Psychiatric: She has a normal mood and affect  Her behavior is normal  Thought content normal    Vitals reviewed        Vital Signs  ED Triage Vitals [01/31/19 1247]   Temperature Pulse Respirations Blood Pressure SpO2   97 7 °F (36 5 °C) 90 18 120/59 100 %      Temp Source Heart Rate Source Patient Position - Orthostatic VS BP Location FiO2 (%)   Oral Monitor Sitting Right arm --      Pain Score       No Pain           Vitals:    01/31/19 1345 01/31/19 1400 01/31/19 1515 01/31/19 1530   BP: 108/53 106/59 102/55 96/54   Pulse: 87 91 83 83   Patient Position - Orthostatic VS: Lying Sitting Sitting Sitting       Visual Acuity  Visual Acuity      Most Recent Value   L Pupil Size (mm)  3   R Pupil Size (mm)  3          ED Medications  Medications   heparin lock flush 100 units/mL injection 300 Units (not administered)       Diagnostic Studies  Results Reviewed     Procedure Component Value Units Date/Time    Troponin I [[id number]]  (Normal) Collected:  01/31/19 1554    Lab Status:  Final result Specimen:  Blood from Arm, Right Updated:  01/31/19 1624     Troponin I <0 02 ng/mL     Basic metabolic panel [[id number]]  (Abnormal) Collected:  01/31/19 1554    Lab Status:  Final result Specimen:  Blood from Arm, Right Updated:  01/31/19 1616     Sodium 141 mmol/L      Potassium 3 3 (L) mmol/L      Chloride 108 mmol/L      CO2 21 mmol/L      ANION GAP 12 mmol/L      BUN 15 mg/dL      Creatinine 0 98 mg/dL      Glucose 90 mg/dL      Calcium 8 1 (L) mg/dL      eGFR 76 ml/min/1 73sq m     Narrative:         National Kidney Disease Education Program recommendations are as follows:  GFR calculation is accurate only with a steady state creatinine  Chronic Kidney disease less than 60 ml/min/1 73 sq  meters  Kidney failure less than 15 ml/min/1 73 sq  meters  TSH [[id number]]  (Normal) Collected:  01/31/19 1320    Lab Status:  Final result Specimen:  Blood from Arm, Right Updated:  01/31/19 1350     TSH 3RD GENERATON 1 192 uIU/mL     Narrative:         Patients undergoing fluorescein dye angiography may retain small amounts of fluorescein in the body for 48-72 hours post procedure  Samples containing fluorescein can produce falsely depressed TSH values  If the patient had this procedure,a specimen should be resubmitted post fluorescein clearance  The recommended reference ranges for TSH during pregnancy are as follows:  First trimester 0 1 to 2 5 uIU/mL  Second trimester  0 2 to 3 0 uIU/mL  Third trimester 0 3 to 3 0 uIU/m      Magnesium [[id number]]  (Normal) Collected:  01/31/19 1320    Lab Status:  Final result Specimen:  Blood from Arm, Right Updated:  01/31/19 1350     Magnesium 2 3 mg/dL     Comprehensive metabolic panel [[id number]]  (Abnormal) Collected:  01/31/19 1320    Lab Status:  Final result Specimen:  Blood from Arm, Right Updated:  01/31/19 1343     Sodium 140 mmol/L      Potassium 3 0 (L) mmol/L      Chloride 107 mmol/L      CO2 23 mmol/L      ANION GAP 10 mmol/L      BUN 14 mg/dL      Creatinine 1 00 mg/dL      Glucose 97 mg/dL      Calcium 8 2 (L) mg/dL      AST 21 U/L      ALT 28 U/L      Alkaline Phosphatase 46 U/L      Total Protein 7 1 g/dL      Albumin 3 6 g/dL      Total Bilirubin 0 30 mg/dL      eGFR 74 ml/min/1 73sq m     Narrative:         National Kidney Disease Education Program recommendations are as follows:  GFR calculation is accurate only with a steady state creatinine  Chronic Kidney disease less than 60 ml/min/1 73 sq  meters  Kidney failure less than 15 ml/min/1 73 sq  meters      Troponin I [[id number]]  (Normal) Collected:  01/31/19 1320    Lab Status:  Final result Specimen:  Blood from Arm, Right Updated:  01/31/19 1342     Troponin I <0 02 ng/mL     POCT pregnancy, urine [[id number]]  (Normal) Resulted:  01/31/19 1342    Lab Status:  Final result Updated:  01/31/19 1342     EXT PREG TEST UR (Ref: Negative) negative    D-Dimer [[id number]]  (Normal) Collected:  01/31/19 1320    Lab Status:  Final result Specimen:  Blood from Arm, Right Updated:  01/31/19 1336     D-Dimer, Quant 477 ng/ml (FEU)     Protime-INR [[id number]]  (Normal) Collected:  01/31/19 1320    Lab Status:  Final result Specimen:  Blood from Arm, Right Updated:  01/31/19 1334     Protime 12 4 seconds      INR 0 97    APTT [[id number]]  (Normal) Collected:  01/31/19 1320    Lab Status:  Final result Specimen:  Blood from Arm, Right Updated:  01/31/19 1334     PTT 30 seconds     CBC and differential [[id number]]  (Abnormal) Collected:  01/31/19 1320    Lab Status:  Final result Specimen:  Blood from Arm, Right Updated:  01/31/19 1325     WBC 8 50 Thousand/uL      RBC 3 83 Million/uL      Hemoglobin 11 9 g/dL      Hematocrit 37 0 %      MCV 97 fL      MCH 31 1 pg      MCHC 32 2 g/dL      RDW 13 8 %      MPV 10 1 fL      Platelets 783 Thousands/uL      nRBC 0 /100 WBCs      Neutrophils Relative 78 (H) %      Immat GRANS % 0 %      Lymphocytes Relative 14 %      Monocytes Relative 6 %      Eosinophils Relative 1 %      Basophils Relative 1 %      Neutrophils Absolute 6 61 Thousands/µL      Immature Grans Absolute 0 02 Thousand/uL      Lymphocytes Absolute 1 18 Thousands/µL      Monocytes Absolute 0 53 Thousand/µL      Eosinophils Absolute 0 09 Thousand/µL      Basophils Absolute 0 07 Thousands/µL                  XR chest 2 views   ED Interpretation by Melva Mohr DO (01/31 1351)   No infiltrate       Final Result by Renea Padilla DO (01/31 9833)      No acute cardiopulmonary disease  Workstation performed: [id number]         CT head without contrast   Final Result by Kumar Floyd MD (01/31 1425)      No acute intracranial abnormality                    Workstation performed: [id number] Procedures  Procedures       Phone Contacts  ED Phone Contact    ED Course         HEART Risk Score      Most Recent Value   History  1 Filed at: 01/31/2019 1326   ECG     Age  0 Filed at: 01/31/2019 1326   Risk Factors  0 Filed at: 01/31/2019 1326   Troponin  0 Filed at: 01/31/2019 1326   Heart Score Risk Calculator   History  1 Filed at: 01/31/2019 1326   ECG     Age  0 Filed at: 01/31/2019 1326   Risk Factors  0 Filed at: 01/31/2019 1326   Troponin  0 Filed at: 01/31/2019 1326   HEART Score     HEART Score                              MDM  Number of Diagnoses or Management Options  Hypokalemia:   Diagnosis management comments: Differential diagnosis 1  Hyperkalemia 2  CVA 3  Acute coronary syndrome 4  Pulmonary embolism  Will check patient's potassium level continue replete her potassium with 60 mEq which was prescribed to be given to her in the infusion center  We will check a D-dimer and CT scan the head  [id number]  Patient feels well  She states she has had symptoms of paresthesias lightheadedness and chest heaviness in the past with hypokalemia  She states that this round of symptoms started yesterday afternoon  We will continue to infuse the patient the rest of the 60 mEq of potassium that she was scheduled to have at the infusion center    Labs otherwise are normal   I will repeat her BMP and troponin at 1600 hr    16:20  Patient's potassium is recent at 3 3 under 2nd troponin is negative will discharge       Amount and/or Complexity of Data Reviewed  Clinical lab tests: reviewed  Tests in the radiology section of CPT®: reviewed  Tests in the medicine section of CPT®: reviewed    Risk of Complications, Morbidity, and/or Mortality  Presenting problems: high  Diagnostic procedures: high  Management options: high        Disposition  Final diagnoses:   Hypokalemia     Time reflects when diagnosis was documented in both MDM as applicable and the Disposition within this note     Time User Action Codes Description Comment    1/31/2019  4:29 PM Lily Aragon Add [E87 6] Hypokalemia       ED Disposition     ED Disposition Condition Date/Time Comment    Discharge  Thu Jan 31, 2019  4:29 PM Femi Valiente discharge to home/self care  Condition at discharge: Good        Follow-up Information    None         Patient's Medications   Discharge Prescriptions    No medications on file     No discharge procedures on file      ED Provider  Electronically Signed by           Melva Mohr DO  01/31/19 8449

## 2019-01-31 NOTE — ED PROCEDURE NOTE
PROCEDURE  ECG 12 Lead Documentation  Date/Time: 1/31/2019 1:27 PM  Performed by: Skyla Cardoza  Authorized by: Skyla Cardoza     Indications / Diagnosis:  Hypokalemia   ECG reviewed by me, the ED Provider: yes    Patient location:  ED  Previous ECG:     Previous ECG:  Unavailable  Interpretation:     Interpretation: non-specific    Rate:     ECG rate:  90    ECG rate assessment: normal    Rhythm:     Rhythm: sinus rhythm    ST segments:     ST segments:  Normal         Shira Phillips DO  01/31/19 1321

## 2019-01-31 NOTE — DISCHARGE INSTRUCTIONS
Hypokalemia   WHAT YOU NEED TO KNOW:   Hypokalemia is a low level of potassium in your blood  Potassium helps control how your muscles, heart, and digestive system work  Hypokalemia occurs when your body loses too much potassium or does not absorb enough from food  DISCHARGE INSTRUCTIONS:   Seek care immediately if:   · You cannot move your arm or leg  · You have a fast or irregular heartbeat  · You are too tired or weak to stand up  Contact your healthcare provider if:   · You are vomiting, or you have diarrhea  · You have numbness or tingling in your arms or legs  · Your symptoms do not go away or they get worse  · You have questions or concerns about your condition or care  Medicines:   · Potassium  will be given to bring your potassium levels back to normal     · Take your medicine as directed  Contact your healthcare provider if you think your medicine is not helping or if you have side effects  Tell him of her if you are allergic to any medicine  Keep a list of the medicines, vitamins, and herbs you take  Include the amounts, and when and why you take them  Bring the list or the pill bottles to follow-up visits  Carry your medicine list with you in case of an emergency  Eat foods that are high in potassium:  Foods that are high in potassium include bananas, oranges, tomatoes, potatoes, and avocado  Kang beans, turkey, salmon, lean beef, yogurt, and milk are also high in potassium  Ask your healthcare provider or dietitian for more information about foods that are high in potassium  Follow up with your healthcare provider as directed:  Write down your questions so you remember to ask them during your visits  © 2017 [employer]  Information is for End User's use only and may not be sold, redistributed or otherwise used for commercial purposes   All illustrations and images included in CareNotes® are the copyrighted property of [employer]  or [employer]  The above information is an  only  It is not intended as medical advice for individual conditions or treatments  Talk to your doctor, nurse or pharmacist before following any medical regimen to see if it is safe and effective for you

## 2019-02-01 LAB
ATRIAL RATE: 90 BPM
P AXIS: 37 DEGREES
PR INTERVAL: 116 MS
QRS AXIS: 8 DEGREES
QRSD INTERVAL: 94 MS
QT INTERVAL: 354 MS
QTC INTERVAL: 433 MS
T WAVE AXIS: 32 DEGREES
VENTRICULAR RATE: 90 BPM

## 2019-02-01 PROCEDURE — 93010 ELECTROCARDIOGRAM REPORT: CPT | Performed by: INTERNAL MEDICINE

## 2019-02-05 ENCOUNTER — HOSPITAL ENCOUNTER (OUTPATIENT)
Facility: HOSPITAL | Age: 34
Setting detail: OBSERVATION
Discharge: HOME | End: 2019-02-06
Attending: FAMILY MEDICINE | Admitting: INTERNAL MEDICINE
Payer: COMMERCIAL

## 2019-02-05 DIAGNOSIS — E87.6 ACUTE HYPOKALEMIA: Primary | ICD-10-CM

## 2019-02-05 LAB
ANION GAP SERPL CALCULATED.3IONS-SCNC: 11 MMOL/L (ref 4–13)
BASOPHILS # BLD AUTO: 0.06 THOUSANDS/ΜL (ref 0–0.1)
BASOPHILS NFR BLD AUTO: 1 % (ref 0–1)
BUN SERPL-MCNC: 14 MG/DL (ref 5–25)
CALCIUM SERPL-MCNC: 8.4 MG/DL (ref 8.3–10.1)
CHLORIDE SERPL-SCNC: 102 MMOL/L (ref 100–108)
CO2 SERPL-SCNC: 25 MMOL/L (ref 21–32)
CREAT SERPL-MCNC: 0.88 MG/DL (ref 0.6–1.3)
EOSINOPHIL # BLD AUTO: 0.15 THOUSAND/ΜL (ref 0–0.61)
EOSINOPHIL NFR BLD AUTO: 2 % (ref 0–6)
ERYTHROCYTE [DISTWIDTH] IN BLOOD BY AUTOMATED COUNT: 13.6 % (ref 11.6–15.1)
GFR SERPL CREATININE-BSD FRML MDRD: 86 ML/MIN/1.73SQ M
GLUCOSE SERPL-MCNC: 80 MG/DL (ref 65–140)
HCT VFR BLD AUTO: 36.6 % (ref 34.8–46.1)
HGB BLD-MCNC: 11.9 G/DL (ref 11.5–15.4)
IMM GRANULOCYTES # BLD AUTO: 0.01 THOUSAND/UL (ref 0–0.2)
IMM GRANULOCYTES NFR BLD AUTO: 0 % (ref 0–2)
LYMPHOCYTES # BLD AUTO: 2.6 THOUSANDS/ΜL (ref 0.6–4.47)
LYMPHOCYTES NFR BLD AUTO: 38 % (ref 14–44)
MAGNESIUM SERPL-MCNC: 2.2 MG/DL (ref 1.6–2.6)
MCH RBC QN AUTO: 30.7 PG (ref 26.8–34.3)
MCHC RBC AUTO-ENTMCNC: 32.5 G/DL (ref 31.4–37.4)
MCV RBC AUTO: 95 FL (ref 82–98)
MONOCYTES # BLD AUTO: 0.58 THOUSAND/ΜL (ref 0.17–1.22)
MONOCYTES NFR BLD AUTO: 8 % (ref 4–12)
NEUTROPHILS # BLD AUTO: 3.47 THOUSANDS/ΜL (ref 1.85–7.62)
NEUTS SEG NFR BLD AUTO: 51 % (ref 43–75)
NRBC BLD AUTO-RTO: 0 /100 WBCS
PHOSPHATE SERPL-MCNC: 3.7 MG/DL (ref 2.7–4.5)
PLATELET # BLD AUTO: 284 THOUSANDS/UL (ref 149–390)
PMV BLD AUTO: 10.6 FL (ref 8.9–12.7)
POTASSIUM SERPL-SCNC: 2.5 MMOL/L (ref 3.5–5.3)
RBC # BLD AUTO: 3.87 MILLION/UL (ref 3.81–5.12)
SODIUM SERPL-SCNC: 138 MMOL/L (ref 136–145)
WBC # BLD AUTO: 6.87 THOUSAND/UL (ref 4.31–10.16)

## 2019-02-05 PROCEDURE — 36415 COLL VENOUS BLD VENIPUNCTURE: CPT | Performed by: FAMILY MEDICINE

## 2019-02-05 PROCEDURE — 83735 ASSAY OF MAGNESIUM: CPT | Performed by: FAMILY MEDICINE

## 2019-02-05 PROCEDURE — 99219 PR INITIAL OBSERVATION CARE/DAY 50 MINUTES: CPT | Performed by: INTERNAL MEDICINE

## 2019-02-05 PROCEDURE — 80048 BASIC METABOLIC PNL TOTAL CA: CPT | Performed by: FAMILY MEDICINE

## 2019-02-05 PROCEDURE — 93005 ELECTROCARDIOGRAM TRACING: CPT

## 2019-02-05 PROCEDURE — 84100 ASSAY OF PHOSPHORUS: CPT | Performed by: FAMILY MEDICINE

## 2019-02-05 PROCEDURE — 96365 THER/PROPH/DIAG IV INF INIT: CPT

## 2019-02-05 PROCEDURE — 85025 COMPLETE CBC W/AUTO DIFF WBC: CPT | Performed by: FAMILY MEDICINE

## 2019-02-05 PROCEDURE — 99284 EMERGENCY DEPT VISIT MOD MDM: CPT

## 2019-02-05 RX ORDER — POTASSIUM CHLORIDE 20 MEQ/1
40 TABLET, EXTENDED RELEASE ORAL ONCE
Status: COMPLETED | OUTPATIENT
Start: 2019-02-05 | End: 2019-02-05

## 2019-02-05 RX ORDER — AMITRIPTYLINE HYDROCHLORIDE 50 MG/1
50 TABLET, FILM COATED ORAL
Status: DISCONTINUED | OUTPATIENT
Start: 2019-02-05 | End: 2019-02-06 | Stop reason: HOSPADM

## 2019-02-05 RX ORDER — POTASSIUM CHLORIDE 20MEQ/15ML
40 LIQUID (ML) ORAL
Status: DISCONTINUED | OUTPATIENT
Start: 2019-02-05 | End: 2019-02-05

## 2019-02-05 RX ORDER — FERROUS SULFATE 325(65) MG
325 TABLET ORAL 2 TIMES DAILY WITH MEALS
Status: DISCONTINUED | OUTPATIENT
Start: 2019-02-06 | End: 2019-02-06 | Stop reason: HOSPADM

## 2019-02-05 RX ORDER — SODIUM BICARBONATE 650 MG/1
650 TABLET ORAL 2 TIMES DAILY
Status: DISCONTINUED | OUTPATIENT
Start: 2019-02-05 | End: 2019-02-06 | Stop reason: HOSPADM

## 2019-02-05 RX ORDER — POTASSIUM CHLORIDE AND SODIUM CHLORIDE 900; 300 MG/100ML; MG/100ML
125 INJECTION, SOLUTION INTRAVENOUS CONTINUOUS
Status: DISCONTINUED | OUTPATIENT
Start: 2019-02-05 | End: 2019-02-06 | Stop reason: HOSPADM

## 2019-02-05 RX ORDER — POTASSIUM CHLORIDE 20 MEQ/1
40 TABLET, EXTENDED RELEASE ORAL ONCE
Status: DISCONTINUED | OUTPATIENT
Start: 2019-02-05 | End: 2019-02-05

## 2019-02-05 RX ORDER — ACETAMINOPHEN 325 MG/1
650 TABLET ORAL EVERY 6 HOURS PRN
Status: DISCONTINUED | OUTPATIENT
Start: 2019-02-05 | End: 2019-02-06 | Stop reason: HOSPADM

## 2019-02-05 RX ORDER — MECLIZINE HYDROCHLORIDE 25 MG/1
25 TABLET ORAL
Status: DISCONTINUED | OUTPATIENT
Start: 2019-02-05 | End: 2019-02-06 | Stop reason: HOSPADM

## 2019-02-05 RX ORDER — POTASSIUM CHLORIDE 14.9 MG/ML
40 INJECTION INTRAVENOUS ONCE
Status: COMPLETED | OUTPATIENT
Start: 2019-02-05 | End: 2019-02-05

## 2019-02-05 RX ORDER — THIAMINE MONONITRATE (VIT B1) 100 MG
100 TABLET ORAL DAILY
Status: DISCONTINUED | OUTPATIENT
Start: 2019-02-06 | End: 2019-02-06 | Stop reason: HOSPADM

## 2019-02-05 RX ORDER — ZINC GLUCONATE 50 MG
50 TABLET ORAL DAILY
Status: DISCONTINUED | OUTPATIENT
Start: 2019-02-06 | End: 2019-02-06 | Stop reason: HOSPADM

## 2019-02-05 RX ORDER — POTASSIUM CHLORIDE 20MEQ/15ML
40 LIQUID (ML) ORAL
Status: DISCONTINUED | OUTPATIENT
Start: 2019-02-06 | End: 2019-02-06 | Stop reason: HOSPADM

## 2019-02-05 RX ADMIN — MECLIZINE HYDROCHLORIDE 25 MG: 25 TABLET ORAL at 21:38

## 2019-02-05 RX ADMIN — POTASSIUM CHLORIDE 40 MEQ: 200 INJECTION, SOLUTION INTRAVENOUS at 19:50

## 2019-02-05 RX ADMIN — POTASSIUM CHLORIDE 40 MEQ: 1500 TABLET, EXTENDED RELEASE ORAL at 16:48

## 2019-02-05 RX ADMIN — POTASSIUM CHLORIDE AND SODIUM CHLORIDE 125 ML/HR: 900; 300 INJECTION, SOLUTION INTRAVENOUS at 18:58

## 2019-02-05 RX ADMIN — POTASSIUM CHLORIDE 40 MEQ: 1500 TABLET, EXTENDED RELEASE ORAL at 19:05

## 2019-02-05 RX ADMIN — SODIUM BICARBONATE 650 MG: 650 TABLET ORAL at 19:50

## 2019-02-05 RX ADMIN — AMITRIPTYLINE HYDROCHLORIDE 50 MG: 50 TABLET, FILM COATED ORAL at 21:38

## 2019-02-05 RX ADMIN — POTASSIUM CHLORIDE 40 MEQ: 200 INJECTION, SOLUTION INTRAVENOUS at 16:49

## 2019-02-05 NOTE — ED PROVIDER NOTES
History  Chief Complaint   Patient presents with    Abnormal Lab     pt had [facility] done yesterday at [facility], potassium was 2 9  pt c/o numbness/tingling/cramping since yesterday  also feels weak       History provided by:  Patient   used: No     This is a 34-year-old female with history of intractable hypokalemia with unknown etiology was told by our nephrology office to come to the ED for IV potassium repletion  Patient states she missed few her doses does tired recheck of her potassium which shows 2 8  Patient states that her nephrologist told to come to the ED for potassium repletion  Patient does complain of numbness and tingling around the mouth and hand area which is consistent with her previous symptoms  She denies any chest pain shortness of breath this time  Denies any headache blurry vision double vision this time  Patient has been seen by at [facility] but was unable to find etiology of hyperkalemia  Prior to Admission Medications   Prescriptions Last Dose Informant Patient Reported?  Taking?   acetaminophen (TYLENOL) 325 mg tablet   No Yes   Sig: Take 2 tablets (650 mg total) by mouth every 6 (six) hours as needed for mild pain, headaches or fever   amitriptyline (ELAVIL) 50 mg tablet   Yes Yes   Sig: Take 50 mg by mouth daily at bedtime   b complex-C-folic acid (NEPHRO-EDIE) 0 8 mg tablet   Yes Yes   Sig: Take 0 8 mg by mouth daily with dinner   enoxaparin (LOVENOX) 40 mg/0 4 mL Not Taking at Unknown time  No No   Sig: Inject 0 4 mL (40 mg total) under the skin every 24 hours   Patient not taking: Reported on 2/5/2019    ergocalciferol (ERGOCALCIFEROL) 82464 units capsule   Yes Yes   Sig: Take 50,000 Units by mouth once a week Patient takes this med on Mondays    ferrous sulfate 325 (65 Fe) mg tablet 2/5/2019 at Unknown time  Yes Yes   Sig: Take 325 mg by mouth 2 (two) times a day with meals   magnesium oxide (MAG-OX) 400 mg 2/5/2019 at Unknown time  No Yes Sig: Take 1 tablet (400 mg total) by mouth daily   meclizine (ANTIVERT) 25 mg tablet   Yes Yes   Sig: Take 25 mg by mouth daily at bedtime   potassium chloride 10 % 2/5/2019 at Unknown time  No Yes   Sig: Take 30 mL (40 mEq total) by mouth 5 (five) times a day   sodium bicarbonate 650 mg tablet 2/5/2019 at Unknown time  No Yes   Sig: Take 1 tablet (650 mg total) by mouth 2 (two) times a day   thiamine 100 MG tablet 2/5/2019 at Unknown time  Yes Yes   Sig: Take 100 mg by mouth daily   zinc gluconate 50 mg tablet 2/5/2019 at Unknown time  Yes Yes   Sig: Take 50 mg by mouth daily      Facility-Administered Medications: None       Past Medical History:   Diagnosis Date    H  pylori infection     Hypokalemia     Migraine        Past Surgical History:   Procedure Laterality Date    ABDOMINAL SURGERY      APPENDECTOMY      CHOLECYSTECTOMY      FL GUIDED CENTRAL VENOUS ACCESS REPLACEMENT  12/21/2018    GASTRIC BYPASS      HYSTERECTOMY      TUNNELED VENOUS PORT PLACEMENT N/A 12/21/2018    Procedure: INSERTION VENOUS PORT (PORT-A-CATH); Surgeon: Leelee Marcleino DO;  Location: [facility];  Service: General       Family History   Problem Relation Age of Onset    Hypertension Father     Diabetes Father     Diabetes Maternal Grandfather      I have reviewed and agree with the history as documented  Social History   Substance Use Topics    Smoking status: Never Smoker    Smokeless tobacco: Never Used    Alcohol use Yes      Comment: socially        Review of Systems   Constitutional: Negative  HENT: Negative  Eyes: Negative  Respiratory: Negative  Cardiovascular: Negative  Gastrointestinal: Negative  Genitourinary: Negative  Musculoskeletal: Negative  Neurological: Positive for numbness  Negative for dizziness, tremors, facial asymmetry, weakness, light-headedness and headaches  Psychiatric/Behavioral: Negative          Physical Exam  Physical Exam   Constitutional: She is oriented to person, place, and time  She appears well-developed and well-nourished  HENT:   Head: Normocephalic and atraumatic  Eyes: Pupils are equal, round, and reactive to light  EOM are normal    Neck: Normal range of motion  Neck supple  Cardiovascular: Normal rate, regular rhythm and normal heart sounds  Pulmonary/Chest: Effort normal and breath sounds normal    Abdominal: Soft  Bowel sounds are normal    Musculoskeletal: Normal range of motion  Neurological: She is alert and oriented to person, place, and time  Skin: Skin is warm  Nursing note and vitals reviewed        Vital Signs  ED Triage Vitals [02/05/19 1509]   Temperature Pulse Respirations Blood Pressure SpO2   97 9 °F (36 6 °C) 94 16 141/62 98 %      Temp Source Heart Rate Source Patient Position - Orthostatic VS BP Location FiO2 (%)   Temporal Monitor Sitting Right arm --      Pain Score       No Pain           Vitals:    02/05/19 1700 02/05/19 1730 02/05/19 1800 02/05/19 1820   BP: 124/74 112/58 107/58 96/55   Pulse: 84 82 83 83   Patient Position - Orthostatic VS: Sitting Sitting Sitting Lying       Visual Acuity  Visual Acuity      Most Recent Value   L Pupil Size (mm)  3   R Pupil Size (mm)  3          ED Medications  Medications   potassium chloride 20 mEq IVPB (premix) (40 mEq Intravenous New Bag 2/5/19 1649)   potassium chloride (K-DUR,KLOR-CON) CR tablet 40 mEq (40 mEq Oral Given 2/5/19 1648)       Diagnostic Studies  Results Reviewed     Procedure Component Value Units Date/Time    Basic metabolic panel [[id number]]  (Abnormal) Collected:  02/05/19 1621    Lab Status:  Final result Specimen:  Blood from Line, Venous Updated:  02/05/19 1647     Sodium 138 mmol/L      Potassium 2 5 (LL) mmol/L      Chloride 102 mmol/L      CO2 25 mmol/L      ANION GAP 11 mmol/L      BUN 14 mg/dL      Creatinine 0 88 mg/dL      Glucose 80 mg/dL      Calcium 8 4 mg/dL      eGFR 86 ml/min/1 73sq m     Narrative:         National Kidney Disease Education Program recommendations are as follows:  GFR calculation is accurate only with a steady state creatinine  Chronic Kidney disease less than 60 ml/min/1 73 sq  meters  Kidney failure less than 15 ml/min/1 73 sq  meters      Magnesium [[id number]]  (Normal) Collected:  02/05/19 1621    Lab Status:  Final result Specimen:  Blood from Line, Venous Updated:  02/05/19 1645     Magnesium 2 2 mg/dL     Phosphorus [[id number]]  (Normal) Collected:  02/05/19 1621    Lab Status:  Final result Specimen:  Blood from Line, Venous Updated:  02/05/19 1645     Phosphorus 3 7 mg/dL     CBC and differential [[id number]] Collected:  02/05/19 1621    Lab Status:  Final result Specimen:  Blood from Line, Venous Updated:  02/05/19 1630     WBC 6 87 Thousand/uL      RBC 3 87 Million/uL      Hemoglobin 11 9 g/dL      Hematocrit 36 6 %      MCV 95 fL      MCH 30 7 pg      MCHC 32 5 g/dL      RDW 13 6 %      MPV 10 6 fL      Platelets 583 Thousands/uL      nRBC 0 /100 WBCs      Neutrophils Relative 51 %      Immat GRANS % 0 %      Lymphocytes Relative 38 %      Monocytes Relative 8 %      Eosinophils Relative 2 %      Basophils Relative 1 %      Neutrophils Absolute 3 47 Thousands/µL      Immature Grans Absolute 0 01 Thousand/uL      Lymphocytes Absolute 2 60 Thousands/µL      Monocytes Absolute 0 58 Thousand/µL      Eosinophils Absolute 0 15 Thousand/µL      Basophils Absolute 0 06 Thousands/µL                  No orders to display              Procedures  Procedures       Phone Contacts  ED Phone Contact    ED Course                               MDM    Disposition  Final diagnoses:   Acute hypokalemia     Time reflects when diagnosis was documented in both MDM as applicable and the Disposition within this note     Time User Action Codes Description Comment    2/5/2019  5:09 PM Matt Avilez Add [E87 6] Acute hypokalemia       ED Disposition     ED Disposition Condition Date/Time Comment    Admit  Tue Feb 5, 2019  5:09 PM Case was discussed with   Luiza and the patient's admission status was agreed to be Admission Status: observation status to the service of Dr Susan Hernandez   Follow-up Information    None         Current Discharge Medication List      CONTINUE these medications which have NOT CHANGED    Details   acetaminophen (TYLENOL) 325 mg tablet Take 2 tablets (650 mg total) by mouth every 6 (six) hours as needed for mild pain, headaches or fever  Refills: 0    Comments: Do not exceed a total of 3 grams of tylenol/acetaminophen in a 24-hour period  Associated Diagnoses: Hypokalemia      amitriptyline (ELAVIL) 50 mg tablet Take 50 mg by mouth daily at bedtime      b complex-C-folic acid (NEPHRO-EDIE) 0 8 mg tablet Take 0 8 mg by mouth daily with dinner      ergocalciferol (ERGOCALCIFEROL) 71087 units capsule Take 50,000 Units by mouth once a week Patient takes this med on Mondays       ferrous sulfate 325 (65 Fe) mg tablet Take 325 mg by mouth 2 (two) times a day with meals      magnesium oxide (MAG-OX) 400 mg Take 1 tablet (400 mg total) by mouth daily  Qty: 30 tablet, Refills: 0    Associated Diagnoses: Hypokalemia      meclizine (ANTIVERT) 25 mg tablet Take 25 mg by mouth daily at bedtime      potassium chloride 10 % Take 30 mL (40 mEq total) by mouth 5 (five) times a day  Qty: 900 mL, Refills: 0    Associated Diagnoses: Hypokalemia      sodium bicarbonate 650 mg tablet Take 1 tablet (650 mg total) by mouth 2 (two) times a day  Qty: 60 tablet, Refills: 0    Associated Diagnoses: Hypokalemia      thiamine 100 MG tablet Take 100 mg by mouth daily      zinc gluconate 50 mg tablet Take 50 mg by mouth daily      enoxaparin (LOVENOX) 40 mg/0 4 mL Inject 0 4 mL (40 mg total) under the skin every 24 hours  Refills: 0    Comments: For DVT Prophylaxis  Associated Diagnoses: Hypokalemia           No discharge procedures on file      ED Provider  Electronically Signed by           Yusuf Cardona MD  02/05/19 MD Pamela  02/05/19 6432

## 2019-02-05 NOTE — H&P
History and Physical - [facility] Internal Medicine    Patient Information: Ean Hernandez 28 y o  female MRN: [id number]  Unit/Bed#: 941-25 Encounter: [id number]  Admitting Physician: Karyna Roca DO  PCP: Ace Carr  Date of Admission:  02/05/19    Assessment/Plan:    Hospital Problem List:     Principal Problem:    Hypokalemia  Active Problems:    Weakness of both lower extremities    Weakness of both upper extremities      Anticipated Length of Stay:  Patient will be admitted on an Observation basis with an anticipated length of stay of  less than 2 midnights  Justification for Hospital Stay:  Hypokalemia, patient symptomatic      Chief Complaint:   Hypokalemia    History of Present Illness:    Ean Hernandez is a 28 y o  female who presents with hypokalemia noted outpatient blood work patient was complaining of weakness in all of her extremities, with some numbness and tingling, cramping sensation  Patient is well-known to the Hospital Medicine service due to recurrent hypokalemia  Review of Systems:    Review of Systems   All other systems reviewed and are negative  Past Medical and Surgical History:     Past Medical History:   Diagnosis Date    H  pylori infection     Hypokalemia     Migraine        Past Surgical History:   Procedure Laterality Date    ABDOMINAL SURGERY      APPENDECTOMY      CHOLECYSTECTOMY      FL GUIDED CENTRAL VENOUS ACCESS REPLACEMENT  12/21/2018    GASTRIC BYPASS      HYSTERECTOMY      TUNNELED VENOUS PORT PLACEMENT N/A 12/21/2018    Procedure: INSERTION VENOUS PORT (PORT-A-CATH); Surgeon: Kimberley Hooker DO;  Location: [facility];  Service: General       Meds/Allergies:    Prior to Admission medications    Medication Sig Start Date End Date Taking?  Authorizing Provider   acetaminophen (TYLENOL) 325 mg tablet Take 2 tablets (650 mg total) by mouth every 6 (six) hours as needed for mild pain, headaches or fever 1/2/19  Yes Kayla Talamantes DO amitriptyline (ELAVIL) 50 mg tablet Take 50 mg by mouth daily at bedtime 8/27/18 8/27/19 Yes Historical Provider, MD mathew complex-C-folic acid (NEPHRO-EDIE) 0 8 mg tablet Take 0 8 mg by mouth daily with dinner   Yes Historical Provider, MD   ergocalciferol (ERGOCALCIFEROL) 56731 units capsule Take 50,000 Units by mouth once a week Patient takes this med on Mondays    Yes Historical Provider, MD   ferrous sulfate 325 (65 Fe) mg tablet Take 325 mg by mouth 2 (two) times a day with meals   Yes Historical Provider, MD   magnesium oxide (MAG-OX) 400 mg Take 1 tablet (400 mg total) by mouth daily 11/6/18  Yes Akash Alvarez MD   meclizine (ANTIVERT) 25 mg tablet Take 25 mg by mouth daily at bedtime 8/27/18  Yes Historical Provider, MD   potassium chloride 10 % Take 30 mL (40 mEq total) by mouth 5 (five) times a day 12/21/18  Yes Andreia Fenton DO   sodium bicarbonate 650 mg tablet Take 1 tablet (650 mg total) by mouth 2 (two) times a day 11/29/18  Yes Sagrario Keita DO   thiamine 100 MG tablet Take 100 mg by mouth daily   Yes Historical Provider, MD   zinc gluconate 50 mg tablet Take 50 mg by mouth daily   Yes Historical Provider, MD   enoxaparin (LOVENOX) 40 mg/0 4 mL Inject 0 4 mL (40 mg total) under the skin every 24 hours  Patient not taking: Reported on 2/5/2019 1/2/19   Andreia Fenton DO     I have reviewed home medications with patient personally  Allergies: Allergies   Allergen Reactions    Nsaids      Other reaction(s):  Other (Please comment)  Should not take s/p bariatric surgery       Social History:     Marital Status: /Civil Union     Substance Use History:   History   Alcohol Use    Yes     Comment: socially     History   Smoking Status    Never Smoker   Smokeless Tobacco    Never Used     History   Drug Use No       Family History:    non-contributory    Physical Exam:     Vitals:   Blood Pressure: 96/55 (02/05/19 1820)  Pulse: 83 (02/05/19 1820)  Temperature: 98 2 °F (36 8 °C) (02/05/19 1820)  Temp Source: Temporal (02/05/19 1820)  Respirations: 18 (02/05/19 1820)  Height: 5' 3" (160 cm) (02/05/19 1820)  Weight - Scale: 69 5 kg (153 lb 3 5 oz) (02/05/19 1820)  SpO2: 100 % (02/05/19 1820)    Physical Exam   Constitutional: She is oriented to person, place, and time  She appears well-developed and well-nourished  No distress  Pulmonary/Chest: Effort normal and breath sounds normal  No respiratory distress  She has no wheezes  Abdominal: Soft  Bowel sounds are normal  She exhibits no distension  Neurological: She is alert and oriented to person, place, and time  No cranial nerve deficit  Coordination normal    Skin: Skin is warm and dry  No rash noted  She is not diaphoretic  No erythema  Psychiatric: She has a normal mood and affect  Her behavior is normal  Judgment and thought content normal    Nursing note and vitals reviewed  Additional Data:     Lab Results: I have personally reviewed pertinent reports  Results from last 7 days  Lab Units 02/05/19  1621   WBC Thousand/uL 6 87   HEMOGLOBIN g/dL 11 9   HEMATOCRIT % 36 6   PLATELETS Thousands/uL 284   NEUTROS PCT % 51   LYMPHS PCT % 38   MONOS PCT % 8   EOS PCT % 2       Results from last 7 days  Lab Units 02/05/19  1621  01/31/19  1320   POTASSIUM mmol/L 2 5*  < > 3 0*   CHLORIDE mmol/L 102  < > 107   CO2 mmol/L 25  < > 23   BUN mg/dL 14  < > 14   CREATININE mg/dL 0 88  < > 1 00   CALCIUM mg/dL 8 4  < > 8 2*   ALK PHOS U/L  --   --  46   ALT U/L  --   --  28   AST U/L  --   --  21   < > = values in this interval not displayed  Results from last 7 days  Lab Units 01/31/19  1320   INR  0 97       Imaging: I have personally reviewed pertinent reports  Xr Chest 2 Views    Result Date: 1/31/2019  Narrative: CHEST INDICATION:   weakness  COMPARISON:  12/21/2018 EXAM PERFORMED/VIEWS:  XR CHEST PA & LATERAL  The frontal view was performed utilizing dual energy radiographic technique   Images: 4 FINDINGS:  Right-sided Port-A-Cath unchanged in position  Cardiomediastinal silhouette appears unremarkable  The lungs are clear  No pneumothorax or pleural effusion  Osseous structures appear within normal limits for patient age  Impression: No acute cardiopulmonary disease  Workstation performed: [id number]     Ct Head Without Contrast    Result Date: 1/31/2019  Narrative: CT BRAIN - WITHOUT CONTRAST INDICATION:   numbness  COMPARISON:  CT brain 7/10/2018 TECHNIQUE:  CT examination of the brain was performed  In addition to axial images, coronal 2D reformatted images were created and submitted for interpretation  Radiation dose length product (DLP) for this visit:  844 98 mGy-cm   This examination, like all CT scans performed in the [facility], was performed utilizing techniques to minimize radiation dose exposure, including the use of iterative  reconstruction and automated exposure control  IMAGE QUALITY:  Diagnostic  FINDINGS: PARENCHYMA:  No intracranial mass, mass effect or midline shift  No CT signs of acute infarction  No acute parenchymal hemorrhage  VENTRICLES AND EXTRA-AXIAL SPACES:  Normal for the patient's age  VISUALIZED ORBITS AND PARANASAL SINUSES:  Unremarkable  CALVARIUM AND EXTRACRANIAL SOFT TISSUES:  Normal      Impression: No acute intracranial abnormality  Workstation performed: [id number]       [employer] / [employer] Records Reviewed: Yes     ** Please Note: This note has been constructed using a voice recognition system   **

## 2019-02-06 VITALS
OXYGEN SATURATION: 97 % | DIASTOLIC BLOOD PRESSURE: 50 MMHG | SYSTOLIC BLOOD PRESSURE: 113 MMHG | HEART RATE: 76 BPM | TEMPERATURE: 97.8 F | HEIGHT: 63 IN | BODY MASS INDEX: 27.15 KG/M2 | RESPIRATION RATE: 18 BRPM | WEIGHT: 153.22 LBS

## 2019-02-06 PROBLEM — R29.898 WEAKNESS OF BOTH LOWER EXTREMITIES: Status: RESOLVED | Noted: 2018-07-11 | Resolved: 2019-02-06

## 2019-02-06 PROBLEM — R29.898 WEAKNESS OF BOTH UPPER EXTREMITIES: Status: RESOLVED | Noted: 2018-07-11 | Resolved: 2019-02-06

## 2019-02-06 LAB
ALBUMIN SERPL BCP-MCNC: 3.1 G/DL (ref 3.5–5)
ALP SERPL-CCNC: 43 U/L (ref 46–116)
ALT SERPL W P-5'-P-CCNC: 21 U/L (ref 12–78)
ANION GAP SERPL CALCULATED.3IONS-SCNC: 9 MMOL/L (ref 4–13)
AST SERPL W P-5'-P-CCNC: 19 U/L (ref 5–45)
ATRIAL RATE: 84 BPM
BILIRUB SERPL-MCNC: 0.3 MG/DL (ref 0.2–1)
BUN SERPL-MCNC: 10 MG/DL (ref 5–25)
CALCIUM SERPL-MCNC: 8 MG/DL (ref 8.3–10.1)
CHLORIDE SERPL-SCNC: 109 MMOL/L (ref 100–108)
CO2 SERPL-SCNC: 21 MMOL/L (ref 21–32)
CREAT SERPL-MCNC: 1.02 MG/DL (ref 0.6–1.3)
GFR SERPL CREATININE-BSD FRML MDRD: 72 ML/MIN/1.73SQ M
GLUCOSE SERPL-MCNC: 97 MG/DL (ref 65–140)
MAGNESIUM SERPL-MCNC: 2 MG/DL (ref 1.6–2.6)
P AXIS: 7 DEGREES
PHOSPHATE SERPL-MCNC: 2.9 MG/DL (ref 2.7–4.5)
POTASSIUM SERPL-SCNC: 3.5 MMOL/L (ref 3.5–5.3)
PR INTERVAL: 116 MS
PROT SERPL-MCNC: 6.2 G/DL (ref 6.4–8.2)
QRS AXIS: 17 DEGREES
QRSD INTERVAL: 102 MS
QT INTERVAL: 358 MS
QTC INTERVAL: 423 MS
SODIUM SERPL-SCNC: 139 MMOL/L (ref 136–145)
T WAVE AXIS: 47 DEGREES
VENTRICULAR RATE: 84 BPM

## 2019-02-06 PROCEDURE — 84100 ASSAY OF PHOSPHORUS: CPT | Performed by: INTERNAL MEDICINE

## 2019-02-06 PROCEDURE — 93010 ELECTROCARDIOGRAM REPORT: CPT | Performed by: INTERNAL MEDICINE

## 2019-02-06 PROCEDURE — 83735 ASSAY OF MAGNESIUM: CPT | Performed by: INTERNAL MEDICINE

## 2019-02-06 PROCEDURE — 80053 COMPREHEN METABOLIC PANEL: CPT | Performed by: INTERNAL MEDICINE

## 2019-02-06 PROCEDURE — 99217 PR OBSERVATION CARE DISCHARGE MANAGEMENT: CPT | Performed by: INTERNAL MEDICINE

## 2019-02-06 RX ORDER — HEPARIN SODIUM (PORCINE) LOCK FLUSH IV SOLN 100 UNIT/ML 100 UNIT/ML
300 SOLUTION INTRAVENOUS ONCE
Status: DISCONTINUED | OUTPATIENT
Start: 2019-02-06 | End: 2019-02-06 | Stop reason: HOSPADM

## 2019-02-06 RX ADMIN — Medication 500 UNITS: at 14:24

## 2019-02-06 RX ADMIN — POTASSIUM CHLORIDE AND SODIUM CHLORIDE 125 ML/HR: 900; 300 INJECTION, SOLUTION INTRAVENOUS at 10:34

## 2019-02-06 RX ADMIN — FERROUS SULFATE TAB 325 MG (65 MG ELEMENTAL FE) 325 MG: 325 (65 FE) TAB at 09:25

## 2019-02-06 RX ADMIN — POTASSIUM CHLORIDE 40 MEQ: 20 SOLUTION ORAL at 10:51

## 2019-02-06 RX ADMIN — SODIUM BICARBONATE 650 MG: 650 TABLET ORAL at 09:25

## 2019-02-06 RX ADMIN — Medication 50 MG: at 09:25

## 2019-02-06 RX ADMIN — MAGNESIUM OXIDE TAB 400 MG (241.3 MG ELEMENTAL MG) 400 MG: 400 (241.3 MG) TAB at 09:25

## 2019-02-06 RX ADMIN — POTASSIUM CHLORIDE 40 MEQ: 20 SOLUTION ORAL at 06:48

## 2019-02-06 RX ADMIN — POTASSIUM CHLORIDE AND SODIUM CHLORIDE 125 ML/HR: 900; 300 INJECTION, SOLUTION INTRAVENOUS at 03:05

## 2019-02-06 RX ADMIN — POTASSIUM CHLORIDE 40 MEQ: 20 SOLUTION ORAL at 14:17

## 2019-02-06 RX ADMIN — Medication 100 MG: at 09:25

## 2019-02-06 NOTE — ASSESSMENT & PLAN NOTE
Patient presented with symptomatic hypokalemia, potassium level has improved after aggressive replacement, no events on telemetry      Stable for discharge, will plan on infusion of 40 mEq of potassium on Friday at the infusion center

## 2019-02-06 NOTE — SOCIAL WORK
Met with pt to discuss role as  in helping pt to develop discharge plan and to help pt carry out their plan  Pt lives in a house with her   Pt has no CARLOS  Pt has everything on the first floor  Pt is independent with ADL's  Pt has no services or DME   Pt has labs drawn on Mondays through [facility] goes to infusion center on Thursday or Friday for potassium infusions  Pt's PCP is Azalia Byrnes  Pt uses the [facility] in [facility]  Will continue to follow for any case management needs

## 2019-02-06 NOTE — UTILIZATION REVIEW
Initial Clinical Review    Admission: Date/Time/Statement: 02/05/19 @ 1710 Observation Written   Orders Placed This Encounter   Procedures    Place in Observation (expected length of stay for this patient is less than two midnights)     Standing Status:   Standing     Number of Occurrences:   1     Order Specific Question:   Admitting Physician     Answer:   Jocy Callejas     Order Specific Question:   Level of Care     Answer:   Med Surg [16]     ED: Date/Time/Mode of Arrival:   ED Arrival Information     Expected Arrival Acuity Means of Arrival Escorted By Service Admission Type    - 2/5/2019 15:00 Urgent Walk-In Self General Medicine Urgent    Arrival Complaint    abnormal lab        Chief Complaint:   Chief Complaint   Patient presents with    Abnormal Lab     pt had [facility] done yesterday at [facility], potassium was 2 9  pt c/o numbness/tingling/cramping since yesterday  also feels weak     History of Illness: This is a 62-year-old female with history of intractable hypokalemia with unknown etiology was told by our nephrology office to come to the ED for IV potassium repletion  Patient states she missed few her doses does tired recheck of her potassium which shows 2 8  Patient states that her nephrologist told to come to the ED for potassium repletion  Patient does complain of numbness and tingling around the mouth and hand area which is consistent with her previous symptoms  She denies any chest pain shortness of breath this time  Denies any headache blurry vision double vision this time    Patient has been seen by at [facility] but was unable to find etiology of hyperkalemia    ED Vital Signs:   ED Triage Vitals [02/05/19 1509]   Temperature Pulse Respirations Blood Pressure SpO2   97 9 °F (36 6 °C) 94 16 141/62 98 %      Temp Source Heart Rate Source Patient Position - Orthostatic VS BP Location FiO2 (%)   Temporal Monitor Sitting Right arm --      Pain Score       No Pain Wt Readings from Last 1 Encounters:   02/05/19 69 5 kg (153 lb 3 5 oz)     Vital Signs (abnormal): BP 90/43  Pertinent Labs/Diagnostic Test Results: K 2 5, , CA 8 0, ALK PHOS 43, TOTAL PROTEIN 6 2, ALB 3 1  ED Treatment:   Medication Administration from 02/05/2019 1500 to 02/05/2019 1814       Date/Time Order Dose Route Action     02/05/2019 1649 potassium chloride 20 mEq IVPB (premix) 40 mEq Intravenous New Bag     02/05/2019 1648 potassium chloride (K-DUR,KLOR-CON) CR tablet 40 mEq 40 mEq Oral Given        Past Medical/Surgical History:    Active Ambulatory Problems     Diagnosis Date Noted    Hypokalemia 07/07/2018    History of gastric bypass 07/07/2018    Migraine without aura and without status migrainosus, not intractable 07/07/2018    Left-sided weakness 07/07/2018    Hypophosphatemia 07/09/2018    Anemia 07/10/2018    Vitamin D deficiency 07/10/2018    Weakness of both lower extremities 07/11/2018    Weakness of both upper extremities 07/11/2018    Elevated CPK 07/11/2018    Vitamin B12 deficiency 07/11/2018    Cervical lymphadenopathy 07/11/2018    Generalized weakness 07/12/2018    Paresthesia of both lower extremities 08/15/2018    Paresthesias 08/15/2018    B12 deficiency 01/17/2016    Gastric bypass status for obesity 10/02/2018    GERD (gastroesophageal reflux disease) 09/03/2018    Migraine 05/29/2014    WAGNER (obstructive sleep apnea) 06/11/2014    Other intestinal malabsorption 10/05/2018    Acute kidney injury ([facility] [address] ) 12/18/2018    Right ovarian cyst 01/02/2019    Chest pain 01/02/2019     Resolved Ambulatory Problems     Diagnosis Date Noted    Non-traumatic rhabdomyolysis 07/07/2018    Transaminitis 07/07/2018    Hypocalcemia 07/09/2018    Hypernatremia 07/09/2018     Past Medical History:   Diagnosis Date    H  pylori infection     Hypokalemia     Migraine      Admitting Diagnosis: Acute hypokalemia [E87 6]  Abnormal laboratory test result [R89 9]  Age/Sex: 28 y o  female  Assessment/Plan: Principal Problem:    Hypokalemia  Active Problems:    Weakness of both lower extremities    Weakness of both upper extremities  Anticipated Length of Stay:  Patient will be admitted on an Observation basis with an anticipated length of stay of  less than 2 midnights     Justification for Hospital Stay:  Hypokalemia, patient symptomatic  Admission Orders:  Telemetry  EKG  Regular diet  OOB as mitra  Sequential compression device   Consult cm  Scheduled Meds:   Current Facility-Administered Medications:  acetaminophen 650 mg Oral Q6H PRN   amitriptyline 50 mg Oral HS   enoxaparin 40 mg Subcutaneous Q24H   ferrous sulfate 325 mg Oral BID With Meals   magnesium oxide 400 mg Oral Daily   meclizine 25 mg Oral HS   potassium chloride 40 mEq Oral 5x Daily   sodium bicarbonate 650 mg Oral BID   sodium chloride 0 9 % with KCl 40 mEq/L 125 mL/hr Intravenous Continuous   thiamine 100 mg Oral Daily   zinc gluconate 50 mg Oral Daily     Continuous Infusions:   sodium chloride 0 9 % with KCl 40 mEq/L 125 mL/hr Last Rate: 125 mL/hr (02/06/19 1034)     PRN Meds:   acetaminophen

## 2019-02-06 NOTE — PLAN OF CARE
DISCHARGE PLANNING     Discharge to home or other facility with appropriate resources Completed        DISCHARGE PLANNING - CARE MANAGEMENT     Discharge to post-acute care or home with appropriate resources Completed        INFECTION - ADULT     Absence or prevention of progression during hospitalization Completed        Knowledge Deficit     Patient/family/caregiver demonstrates understanding of disease process, treatment plan, medications, and discharge instructions Completed        METABOLIC, FLUID AND ELECTROLYTES - ADULT     Electrolytes maintained within normal limits Completed        PAIN - ADULT     Verbalizes/displays adequate comfort level or baseline comfort level Completed        SAFETY ADULT     Patient will remain free of falls Completed     Maintain or return to baseline ADL function Completed     Maintain or return mobility status to optimal level Completed

## 2019-02-06 NOTE — PLAN OF CARE
DISCHARGE PLANNING     Discharge to home or other facility with appropriate resources Progressing        INFECTION - ADULT     Absence or prevention of progression during hospitalization Progressing        Knowledge Deficit     Patient/family/caregiver demonstrates understanding of disease process, treatment plan, medications, and discharge instructions Progressing        METABOLIC, FLUID AND ELECTROLYTES - ADULT     Electrolytes maintained within normal limits Progressing        PAIN - ADULT     Verbalizes/displays adequate comfort level or baseline comfort level Progressing        SAFETY ADULT     Patient will remain free of falls Progressing     Maintain or return to baseline ADL function Progressing     Maintain or return mobility status to optimal level Progressing

## 2019-02-06 NOTE — SOCIAL WORK
Pt is being discharged today  Pt's  will give the patient a ride home  Pt has a potassium  infusion scheduled for 1pm on February 8, 2019  Case Management reviewed discharge planning process including the following: identifying help that is needed at home, pt's preference for discharge needs and Meds at [facility]  Reviewed with Pt that any member of the healthcare team can answer questions regarding : medications, jmportance of recognizing  Signs and symptoms of any  medical problems  Case Management also encouraged pt to follow up with all recommended appointments after discharge

## 2019-02-06 NOTE — DISCHARGE SUMMARY
Discharge- Kaitlin Blanca 1985, 28 y o  female MRN: [id number]    Unit/Bed#: 408-01 Encounter: [id number]    Primary Care Provider: ARIADNA Rolon   Date and time admitted to hospital: 2/5/2019  3:03 PM        * Hypokalemia   Assessment & Plan    Patient presented with symptomatic hypokalemia, potassium level has improved after aggressive replacement, no events on telemetry  Stable for discharge, will plan on infusion of 40 mEq of potassium on Friday at the infusion center       Discharging Physician / Practitioner: Mani Woodson DO  PCP: Temi Rolon  Admission Date:   Admission Orders     Ordered        02/05/19 1710  Place in Observation (expected length of stay for this patient is less than two midnights)  Once             Discharge Date: 02/06/19    Resolved Problems  Date Reviewed: 2/6/2019          Resolved    Weakness of both lower extremities 2/6/2019     Resolved by  aMni Woodson DO    Weakness of both upper extremities 2/6/2019     Resolved by  Mani Woodson DO          Hospital Course: Kaitlin Blanca is a 28 y o  female patient who originally presented to the hospital on 2/5/2019 due to symptomatic hypokalemia  Please see above list of diagnoses and related plan for additional information  Condition at Discharge: good     Discharge Day Visit / Exam:     Subjective:  No pain, patient reports resolution of her symptoms  Vitals: Blood Pressure: 113/50 (02/06/19 0719)  Pulse: 76 (02/06/19 0719)  Temperature: 97 8 °F (36 6 °C) (02/06/19 0719)  Temp Source: Temporal (02/06/19 0719)  Respirations: 18 (02/06/19 0719)  Height: 5' 3" (160 cm) (02/05/19 1820)  Weight - Scale: 69 5 kg (153 lb 3 5 oz) (02/05/19 1820)  SpO2: 97 % (02/06/19 0719)  Exam:   Physical Exam   Constitutional: She appears well-nourished  No distress  Skin: She is not diaphoretic  Nursing note and vitals reviewed        Discharge instructions/Information to patient and family:   See after visit summary for information provided to patient and family  Provisions for Follow-Up Care:  See after visit summary for information related to follow-up care and any pertinent home health orders  Disposition:     Home    For Discharges to [facility]F:   · Not Applicable to this Patient - Not Applicable to this Patient    Planned Readmission: no     Discharge Statement:  I spent 15 minutes discharging the patient  This time was spent on the day of discharge  I had direct contact with the patient on the day of discharge  Greater than 50% of the total time was spent examining patient, answering all patient questions, arranging and discussing plan of care with patient as well as directly providing post-discharge instructions  Additional time then spent on discharge activities  Discharge Medications:  See after visit summary for reconciled discharge medications provided to patient and family        ** Please Note: This note has been constructed using a voice recognition system **

## 2019-02-06 NOTE — NURSING NOTE
AVS printed and reviewed with patient  Patient verbalized understanding    No homecare ordered on D/C

## 2019-02-07 ENCOUNTER — HOSPITAL ENCOUNTER (OUTPATIENT)
Dept: INFUSION CENTER | Facility: HOSPITAL | Age: 34
Discharge: HOME | End: 2019-02-07
Payer: COMMERCIAL

## 2019-02-07 VITALS
RESPIRATION RATE: 20 BRPM | SYSTOLIC BLOOD PRESSURE: 113 MMHG | DIASTOLIC BLOOD PRESSURE: 53 MMHG | OXYGEN SATURATION: 100 % | TEMPERATURE: 97.9 F | HEART RATE: 88 BPM

## 2019-02-07 PROCEDURE — 96365 THER/PROPH/DIAG IV INF INIT: CPT

## 2019-02-07 PROCEDURE — 96366 THER/PROPH/DIAG IV INF ADDON: CPT

## 2019-02-07 RX ORDER — SODIUM CHLORIDE 9 MG/ML
20 INJECTION, SOLUTION INTRAVENOUS CONTINUOUS
Status: DISPENSED | OUTPATIENT
Start: 2019-02-07 | End: 2019-02-07

## 2019-02-07 RX ORDER — POTASSIUM CHLORIDE AND SODIUM CHLORIDE 900; 300 MG/100ML; MG/100ML
250 INJECTION, SOLUTION INTRAVENOUS ONCE
Status: COMPLETED | OUTPATIENT
Start: 2019-02-07 | End: 2019-02-07

## 2019-02-07 RX ADMIN — SODIUM CHLORIDE 20 ML/HR: 0.9 INJECTION, SOLUTION INTRAVENOUS at 08:20

## 2019-02-07 RX ADMIN — POTASSIUM CHLORIDE AND SODIUM CHLORIDE 250 ML/HR: 900; 300 INJECTION, SOLUTION INTRAVENOUS at 08:43

## 2019-02-07 NOTE — PROGRESS NOTES
Pt arrived on unit at 0800  Offers no complaints  States she feels pretty good today  Okay to proceed with treatment today

## 2019-02-15 ENCOUNTER — HOSPITAL ENCOUNTER (OUTPATIENT)
Dept: INFUSION CENTER | Facility: HOSPITAL | Age: 34
Discharge: HOME | End: 2019-02-15
Payer: COMMERCIAL

## 2019-02-15 VITALS
OXYGEN SATURATION: 98 % | SYSTOLIC BLOOD PRESSURE: 102 MMHG | RESPIRATION RATE: 18 BRPM | DIASTOLIC BLOOD PRESSURE: 50 MMHG | HEART RATE: 95 BPM | TEMPERATURE: 98.7 F

## 2019-02-15 LAB
ANION GAP SERPL CALCULATED.3IONS-SCNC: 10 MMOL/L (ref 4–13)
BUN SERPL-MCNC: 19 MG/DL (ref 5–25)
CALCIUM SERPL-MCNC: 8.1 MG/DL (ref 8.3–10.1)
CHLORIDE SERPL-SCNC: 106 MMOL/L (ref 100–108)
CO2 SERPL-SCNC: 24 MMOL/L (ref 21–32)
CREAT SERPL-MCNC: 0.97 MG/DL (ref 0.6–1.3)
GFR SERPL CREATININE-BSD FRML MDRD: 76 ML/MIN/1.73SQ M
GLUCOSE SERPL-MCNC: 82 MG/DL (ref 65–140)
POTASSIUM SERPL-SCNC: 2.6 MMOL/L (ref 3.5–5.3)
SODIUM SERPL-SCNC: 140 MMOL/L (ref 136–145)

## 2019-02-15 PROCEDURE — 96366 THER/PROPH/DIAG IV INF ADDON: CPT

## 2019-02-15 PROCEDURE — 96365 THER/PROPH/DIAG IV INF INIT: CPT

## 2019-02-15 PROCEDURE — 80048 BASIC METABOLIC PNL TOTAL CA: CPT | Performed by: INTERNAL MEDICINE

## 2019-02-15 RX ORDER — SODIUM CHLORIDE 9 MG/ML
20 INJECTION, SOLUTION INTRAVENOUS CONTINUOUS
Status: DISPENSED | OUTPATIENT
Start: 2019-02-15 | End: 2019-02-15

## 2019-02-15 RX ORDER — POTASSIUM CHLORIDE 14.9 MG/ML
20 INJECTION INTRAVENOUS ONCE
Status: COMPLETED | OUTPATIENT
Start: 2019-02-15 | End: 2019-02-15

## 2019-02-15 RX ORDER — POTASSIUM CHLORIDE 14.9 MG/ML
20 INJECTION INTRAVENOUS
Status: COMPLETED | OUTPATIENT
Start: 2019-02-15 | End: 2019-02-15

## 2019-02-15 RX ADMIN — POTASSIUM CHLORIDE 20 MEQ: 200 INJECTION, SOLUTION INTRAVENOUS at 09:02

## 2019-02-15 RX ADMIN — POTASSIUM CHLORIDE 20 MEQ: 200 INJECTION, SOLUTION INTRAVENOUS at 13:43

## 2019-02-15 RX ADMIN — SODIUM CHLORIDE 20 ML/HR: 0.9 INJECTION, SOLUTION INTRAVENOUS at 08:14

## 2019-02-15 RX ADMIN — POTASSIUM CHLORIDE 20 MEQ: 200 INJECTION, SOLUTION INTRAVENOUS at 11:21

## 2019-02-15 NOTE — PROGRESS NOTES
Critical potassium level of 2 6 reported to Dr Americo Brantley  Or  maria luisa received and noted  Patient will receive potassium 60 meq today over 6 hours

## 2019-02-15 NOTE — PLAN OF CARE
Problem: INFECTION - ADULT  Goal: Absence or prevention of progression during hospitalization  Description  INTERVENTIONS:  - Assess and monitor for signs and symptoms of infection  - Monitor lab/diagnostic results  - Monitor all insertion sites, i e  indwelling lines, tubes, and drains  - Monitor endotracheal (as able) and nasal secretions for changes in amount and color  - [place] appropriate cooling/warming therapies per order  - Administer medications as ordered  - Instruct and encourage patient and family to use good hand hygiene technique  - Identify and instruct in appropriate isolation precautions for identified infection/condition  Outcome: Progressing  Goal: Absence of fever/infection during neutropenic period  Description  INTERVENTIONS:  - Monitor WBC  - Implement neutropenic guidelines  Outcome: Progressing

## 2019-02-17 RX ORDER — POTASSIUM CHLORIDE 14.9 MG/ML
20 INJECTION INTRAVENOUS
Status: DISCONTINUED | OUTPATIENT
Start: 2019-02-19 | End: 2019-02-18

## 2019-02-18 ENCOUNTER — HOSPITAL ENCOUNTER (INPATIENT)
Facility: HOSPITAL | Age: 34
LOS: 2 days | Discharge: HOME | DRG: 425 | End: 2019-02-20
Attending: INTERNAL MEDICINE | Admitting: INTERNAL MEDICINE
Payer: COMMERCIAL

## 2019-02-18 DIAGNOSIS — E87.6 HYPOKALEMIA: Primary | ICD-10-CM

## 2019-02-18 DIAGNOSIS — E87.6 ACUTE HYPOKALEMIA: Primary | ICD-10-CM

## 2019-02-18 LAB
ALBUMIN SERPL BCP-MCNC: 3.2 G/DL (ref 3.5–5)
ALP SERPL-CCNC: 44 U/L (ref 46–116)
ALT SERPL W P-5'-P-CCNC: 28 U/L (ref 12–78)
ANION GAP SERPL CALCULATED.3IONS-SCNC: 10 MMOL/L (ref 4–13)
AST SERPL W P-5'-P-CCNC: 22 U/L (ref 5–45)
BASOPHILS # BLD AUTO: 0.06 THOUSANDS/ΜL (ref 0–0.1)
BASOPHILS NFR BLD AUTO: 1 % (ref 0–1)
BILIRUB SERPL-MCNC: 0.2 MG/DL (ref 0.2–1)
BUN SERPL-MCNC: 13 MG/DL (ref 5–25)
CALCIUM SERPL-MCNC: 8.2 MG/DL (ref 8.3–10.1)
CHLORIDE SERPL-SCNC: 105 MMOL/L (ref 100–108)
CO2 SERPL-SCNC: 25 MMOL/L (ref 21–32)
CREAT SERPL-MCNC: 0.96 MG/DL (ref 0.6–1.3)
EOSINOPHIL # BLD AUTO: 0.17 THOUSAND/ΜL (ref 0–0.61)
EOSINOPHIL NFR BLD AUTO: 2 % (ref 0–6)
ERYTHROCYTE [DISTWIDTH] IN BLOOD BY AUTOMATED COUNT: 13.3 % (ref 11.6–15.1)
GFR SERPL CREATININE-BSD FRML MDRD: 77 ML/MIN/1.73SQ M
GLUCOSE SERPL-MCNC: 80 MG/DL (ref 65–140)
HCT VFR BLD AUTO: 34.4 % (ref 34.8–46.1)
HGB BLD-MCNC: 10.9 G/DL (ref 11.5–15.4)
IMM GRANULOCYTES # BLD AUTO: 0.01 THOUSAND/UL (ref 0–0.2)
IMM GRANULOCYTES NFR BLD AUTO: 0 % (ref 0–2)
LYMPHOCYTES # BLD AUTO: 2.64 THOUSANDS/ΜL (ref 0.6–4.47)
LYMPHOCYTES NFR BLD AUTO: 33 % (ref 14–44)
MAGNESIUM SERPL-MCNC: 2.2 MG/DL (ref 1.6–2.6)
MCH RBC QN AUTO: 30.3 PG (ref 26.8–34.3)
MCHC RBC AUTO-ENTMCNC: 31.7 G/DL (ref 31.4–37.4)
MCV RBC AUTO: 96 FL (ref 82–98)
MONOCYTES # BLD AUTO: 0.56 THOUSAND/ΜL (ref 0.17–1.22)
MONOCYTES NFR BLD AUTO: 7 % (ref 4–12)
NEUTROPHILS # BLD AUTO: 4.69 THOUSANDS/ΜL (ref 1.85–7.62)
NEUTS SEG NFR BLD AUTO: 57 % (ref 43–75)
NRBC BLD AUTO-RTO: 0 /100 WBCS
PLATELET # BLD AUTO: 259 THOUSANDS/UL (ref 149–390)
PMV BLD AUTO: 10.4 FL (ref 8.9–12.7)
POTASSIUM SERPL-SCNC: 3 MMOL/L (ref 3.5–5.3)
PROT SERPL-MCNC: 6.4 G/DL (ref 6.4–8.2)
RBC # BLD AUTO: 3.6 MILLION/UL (ref 3.81–5.12)
SODIUM SERPL-SCNC: 140 MMOL/L (ref 136–145)
WBC # BLD AUTO: 8.13 THOUSAND/UL (ref 4.31–10.16)

## 2019-02-18 PROCEDURE — 85025 COMPLETE CBC W/AUTO DIFF WBC: CPT | Performed by: STUDENT IN AN ORGANIZED HEALTH CARE EDUCATION/TRAINING PROGRAM

## 2019-02-18 PROCEDURE — 99222 1ST HOSP IP/OBS MODERATE 55: CPT | Performed by: INTERNAL MEDICINE

## 2019-02-18 PROCEDURE — 93005 ELECTROCARDIOGRAM TRACING: CPT

## 2019-02-18 PROCEDURE — 80053 COMPREHEN METABOLIC PANEL: CPT | Performed by: INTERNAL MEDICINE

## 2019-02-18 PROCEDURE — 83735 ASSAY OF MAGNESIUM: CPT | Performed by: STUDENT IN AN ORGANIZED HEALTH CARE EDUCATION/TRAINING PROGRAM

## 2019-02-18 RX ORDER — POTASSIUM CHLORIDE 20MEQ/15ML
40 LIQUID (ML) ORAL
Status: DISCONTINUED | OUTPATIENT
Start: 2019-02-18 | End: 2019-02-19

## 2019-02-18 RX ORDER — SODIUM BICARBONATE 650 MG/1
650 TABLET ORAL 2 TIMES DAILY
Status: DISCONTINUED | OUTPATIENT
Start: 2019-02-18 | End: 2019-02-20 | Stop reason: HOSPADM

## 2019-02-18 RX ORDER — FERROUS SULFATE 325(65) MG
325 TABLET ORAL 2 TIMES DAILY WITH MEALS
Status: DISCONTINUED | OUTPATIENT
Start: 2019-02-18 | End: 2019-02-20 | Stop reason: HOSPADM

## 2019-02-18 RX ORDER — ZINC GLUCONATE 50 MG
50 TABLET ORAL DAILY
Status: DISCONTINUED | OUTPATIENT
Start: 2019-02-19 | End: 2019-02-20 | Stop reason: HOSPADM

## 2019-02-18 RX ORDER — AMITRIPTYLINE HYDROCHLORIDE 50 MG/1
50 TABLET, FILM COATED ORAL
Status: DISCONTINUED | OUTPATIENT
Start: 2019-02-18 | End: 2019-02-20 | Stop reason: HOSPADM

## 2019-02-18 RX ORDER — THIAMINE MONONITRATE (VIT B1) 100 MG
100 TABLET ORAL DAILY
Status: DISCONTINUED | OUTPATIENT
Start: 2019-02-19 | End: 2019-02-20 | Stop reason: HOSPADM

## 2019-02-18 RX ORDER — MECLIZINE HYDROCHLORIDE 25 MG/1
25 TABLET ORAL
Status: DISCONTINUED | OUTPATIENT
Start: 2019-02-18 | End: 2019-02-20 | Stop reason: HOSPADM

## 2019-02-18 RX ORDER — SODIUM CHLORIDE 9 MG/ML
20 INJECTION, SOLUTION INTRAVENOUS CONTINUOUS
Status: DISPENSED | OUTPATIENT
Start: 2019-02-19 | End: 2019-02-19

## 2019-02-18 RX ORDER — POTASSIUM CHLORIDE 14.9 MG/ML
20 INJECTION INTRAVENOUS
Status: COMPLETED | OUTPATIENT
Start: 2019-02-18 | End: 2019-02-19

## 2019-02-18 RX ORDER — POTASSIUM CHLORIDE AND SODIUM CHLORIDE 900; 300 MG/100ML; MG/100ML
100 INJECTION, SOLUTION INTRAVENOUS CONTINUOUS
Status: DISCONTINUED | OUTPATIENT
Start: 2019-02-18 | End: 2019-02-20 | Stop reason: HOSPADM

## 2019-02-18 RX ADMIN — POTASSIUM CHLORIDE 20 MEQ: 200 INJECTION, SOLUTION INTRAVENOUS at 19:27

## 2019-02-18 RX ADMIN — POTASSIUM CHLORIDE 40 MEQ: 20 SOLUTION ORAL at 19:27

## 2019-02-18 RX ADMIN — SODIUM BICARBONATE 650 MG: 650 TABLET ORAL at 19:26

## 2019-02-18 RX ADMIN — POTASSIUM CHLORIDE 20 MEQ: 200 INJECTION, SOLUTION INTRAVENOUS at 23:34

## 2019-02-18 RX ADMIN — POTASSIUM CHLORIDE AND SODIUM CHLORIDE 100 ML/HR: 900; 300 INJECTION, SOLUTION INTRAVENOUS at 19:37

## 2019-02-18 RX ADMIN — POTASSIUM CHLORIDE 20 MEQ: 200 INJECTION, SOLUTION INTRAVENOUS at 21:30

## 2019-02-18 RX ADMIN — FERROUS SULFATE TAB 325 MG (65 MG ELEMENTAL FE) 325 MG: 325 (65 FE) TAB at 19:26

## 2019-02-18 RX ADMIN — MECLIZINE HYDROCHLORIDE 25 MG: 25 TABLET ORAL at 21:30

## 2019-02-18 RX ADMIN — AMITRIPTYLINE HYDROCHLORIDE 50 MG: 50 TABLET, FILM COATED ORAL at 21:30

## 2019-02-18 NOTE — ASSESSMENT & PLAN NOTE
Patient reported worsening facial and upper and lower extremities numbness and tingling started since last Thursday  - due to hypokalemia  - IV and PO potassium replacement, follow nephrology recommendation

## 2019-02-18 NOTE — H&P
H&P- Em Laird 1985, 27 y o  female MRN: [id number]    Unit/Bed#: 424-01 Encounter: [id number]    Primary Care Provider: Rosamaria Huffman MD   Date and time admitted to hospital: 2/18/2019  4:32 PM        * Hypokalemia  Assessment & Plan  Patient presented with symptomatic hypokalemia, potassium recorded at 2 6 on 2/15/19 while patient was receiving potassium infusion therapy  - obtain nephrology consult, follow nephrology recommendation on potassium replacement  - patient currently takes potassium chloride 40 mEq PO 5 times daily home regimen  - monitor potassium and magnesium level  - EKG to look for changes and r/o arrhythmias associated with hypokalemia    Paresthesias  Assessment & Plan  Patient reported worsening facial and upper and lower extremities numbness and tingling started since last Thursday  - due to hypokalemia  - IV and PO potassium replacement, follow nephrology recommendation    VTE Prophylaxis: Enoxaparin (Lovenox)  / reason for no mechanical VTE prophylaxis patient ambulates   Code Status: Level 1 - Full code  POLST: POLST form is not discussed and not completed at this time  Discussion with family: none    Anticipated Length of Stay:  Patient will be admitted on an Inpatient basis with an anticipated length of stay of  More than 2 midnights  Total Time for Visit, including Counseling / Coordination of Care: 20 minutes  Greater than 50% of this total time spent on direct patient counseling and coordination of care  Chief Complaint:   Weakness and numbness and tingling    History of Present Illness:    Em Laird is a 27 y o  female with history of hypokalemia who presents with generalized weakness and numbness and tingling sensation all over her body  She states that symptoms began last Thursday started with bilateral feet cramping, then with vibrating sensation in bilateral hand and feet   Yesterday patient started to have perioral numbness and tingling sensation spreads to bilateral cheeks, eyes, and ears  Patient reports generalized fatigue  She also reports chest tightness like something sitting on her chest  She receives potassium infusion once a week but she recently approved to increase potassium infusion to twice weekly  She said she recently went to [facility] for hypokalemia workup but could not find the cause of hypokalemia  Patient denied constipation, headache, blurry vision  Review of Systems:    Review of Systems   Constitutional: Negative for chills and fatigue  HENT: Negative for congestion and sore throat  Eyes: Negative for pain and visual disturbance  Respiratory: Positive for chest tightness  Negative for shortness of breath  Cardiovascular: Negative for chest pain and palpitations  Gastrointestinal: Negative for abdominal pain, constipation, nausea and vomiting  Genitourinary: Negative for dysuria  Musculoskeletal: Negative for back pain  Neurological: Positive for weakness and numbness  Negative for headaches  Numbness and tingling in face, bilateral hands and feet   Psychiatric/Behavioral: Negative for confusion  Past Medical and Surgical History:     Past Medical History:   Diagnosis Date    H  pylori infection     Hypokalemia     Migraine        Past Surgical History:   Procedure Laterality Date    ABDOMINAL SURGERY      APPENDECTOMY      CHOLECYSTECTOMY      FL GUIDED CENTRAL VENOUS ACCESS REPLACEMENT  12/21/2018    GASTRIC BYPASS      HYSTERECTOMY      TUNNELED VENOUS PORT PLACEMENT N/A 12/21/2018    Procedure: INSERTION VENOUS PORT (PORT-A-CATH); Surgeon: Sergio Platt DO;  Location: [facility];  Service: General       Meds/Allergies:    Prior to Admission medications    Medication Sig Start Date End Date Taking?  Authorizing Provider   amitriptyline (ELAVIL) 50 mg tablet Take 50 mg by mouth daily at bedtime 8/27/18 8/27/19 Yes Historical Provider, MD   b complex-C-folic acid (NEPHRO-EDIE) 0 8 mg tablet Take 0 8 mg by mouth daily with dinner   Yes Historical Provider, MD   ergocalciferol (ERGOCALCIFEROL) 96587 units capsule Take 50,000 Units by mouth once a week Patient takes this med on Mondays    Yes Historical Provider, MD   ferrous sulfate 325 (65 Fe) mg tablet Take 325 mg by mouth 2 (two) times a day with meals   Yes Historical Provider, MD   magnesium oxide (MAG-OX) 400 mg Take 1 tablet (400 mg total) by mouth daily 11/6/18  Yes Dedrick Lorenzo MD   meclizine (ANTIVERT) 25 mg tablet Take 25 mg by mouth daily at bedtime 8/27/18  Yes Historical Provider, MD   potassium chloride 10 % Take 30 mL (40 mEq total) by mouth 5 (five) times a day 12/21/18  Yes Jagdeep Abel DO   sodium bicarbonate 650 mg tablet Take 1 tablet (650 mg total) by mouth 2 (two) times a day 11/29/18  Yes Karyna Roca DO   thiamine 100 MG tablet Take 100 mg by mouth daily   Yes Historical Provider, MD   zinc gluconate 50 mg tablet Take 50 mg by mouth daily   Yes Historical Provider, MD   acetaminophen (TYLENOL) 325 mg tablet Take 2 tablets (650 mg total) by mouth every 6 (six) hours as needed for mild pain, headaches or fever  Patient not taking: Reported on 2/18/2019 1/2/19   Jagdeep Abel DO   enoxaparin (LOVENOX) 40 mg/0 4 mL Inject 0 4 mL (40 mg total) under the skin every 24 hours  Patient not taking: Reported on 2/18/2019 1/2/19   Jagdeep Abel DO     I have reviewed home medications with patient personally  Allergies: Allergies   Allergen Reactions    Nsaids      Other reaction(s):  Other (Please comment)  Should not take s/p bariatric surgery       Social History:     Marital Status: /Civil Union     Substance Use History:   Social History     Substance and Sexual Activity   Alcohol Use Never    Alcohol/week: 0 0 oz    Frequency: Never    Drinks per session: Patient refused    Binge frequency: Never     Social History     Tobacco Use   Smoking Status Never Smoker   Smokeless Tobacco Never Used Social History     Substance and Sexual Activity   Drug Use No       Family History:    non-contributory    Physical Exam:     Vitals:   Blood Pressure: 129/72 (02/18/19 1641)  Pulse: 88 (02/18/19 1641)  Temperature: 97 8 °F (36 6 °C) (02/18/19 1641)  Temp Source: Temporal (02/18/19 1641)  Respirations: 18 (02/18/19 1641)  Height: 5' 3" (160 cm) (02/18/19 1641)  Weight - Scale: 70 6 kg (155 lb 10 3 oz) (02/18/19 1641)  SpO2: 100 % (02/18/19 1641)    Physical Exam   Constitutional: She is oriented to person, place, and time  No distress  Eyes: Pupils are equal, round, and reactive to light  Right eye exhibits no discharge  Left eye exhibits no discharge  Neck: Normal range of motion  Cardiovascular: Normal rate and normal heart sounds  No murmur heard  Pulmonary/Chest: Effort normal and breath sounds normal  No respiratory distress  She has no wheezes  She exhibits no tenderness  Abdominal: Soft  Bowel sounds are normal  She exhibits no distension  There is no tenderness  Musculoskeletal: Normal range of motion  She exhibits no edema or tenderness  Neurological: She is alert and oriented to person, place, and time  Skin: Skin is warm  She is not diaphoretic  Psychiatric: She has a normal mood and affect  Her behavior is normal            Additional Data:     Lab Results: I have personally reviewed pertinent reports  Results from last 7 days   Lab Units 02/15/19  0820   SODIUM mmol/L 140   POTASSIUM mmol/L 2 6*   CHLORIDE mmol/L 106   CO2 mmol/L 24   BUN mg/dL 19   CREATININE mg/dL 0 97   ANION GAP mmol/L 10   CALCIUM mg/dL 8 1*   GLUCOSE RANDOM mg/dL 82                       Imaging: I have personally reviewed pertinent reports  No orders to display       EKG, Pathology, and Other Studies Reviewed on Admission:   · EKG: pending    [facility] / [facility] Records Reviewed:  Yes

## 2019-02-18 NOTE — ASSESSMENT & PLAN NOTE
Patient presented with symptomatic hypokalemia, potassium recorded at 2 6 on 2/15/19 while patient was receiving potassium infusion therapy  - obtain nephrology consult, follow nephrology recommendation on potassium replacement  - patient currently takes potassium chloride 40 mEq PO 5 times daily home regimen  - monitor potassium and magnesium level  - EKG to look for changes and r/o arrhythmias associated with hypokalemia

## 2019-02-18 NOTE — PLAN OF CARE
Problem: PAIN - ADULT  Goal: Verbalizes/displays adequate comfort level or baseline comfort level  Description  Interventions:  - Encourage patient to monitor pain and request assistance  - Assess pain using appropriate pain scale  - Administer analgesics based on type and severity of pain and evaluate response  - Implement non-pharmacological measures as appropriate and evaluate response  - Consider cultural and social influences on pain and pain management  - Notify physician/advanced practitioner if interventions unsuccessful or patient reports new pain  Outcome: Progressing     Problem: INFECTION - ADULT  Goal: Absence or prevention of progression during hospitalization  Description  INTERVENTIONS:  - Assess and monitor for signs and symptoms of infection  - Monitor lab/diagnostic results  - Monitor all insertion sites, i e  indwelling lines, tubes, and drains  - [place] appropriate cooling/warming therapies per order  - Administer medications as ordered  - Instruct and encourage patient and family to use good hand hygiene technique  - Identify and instruct in appropriate isolation precautions for identified infection/condition   Outcome: Progressing     Problem: SAFETY ADULT  Goal: Patient will remain free of falls  Description  INTERVENTIONS:  - Assess patient frequently for physical needs  -  Identify cognitive and physical deficits and behaviors that affect risk of falls    -  [place] fall precautions as indicated by assessment   - Educate patient/family on patient safety including physical limitations  - Instruct patient to call for assistance with activity based on assessment  - Modify environment to reduce risk of injury  - Consider OT/PT consult to assist with strengthening/mobility  Outcome: Progressing  Goal: Maintain or return to baseline ADL function  Description  INTERVENTIONS:  -  Assess patient's ability to carry out ADLs; assess patient's baseline for ADL function and identify physical deficits which impact ability to perform ADLs (bathing, care of mouth/teeth, toileting, grooming, dressing, etc )  - Assess/evaluate cause of self-care deficits   - Assess range of motion  - Assess patient's mobility; develop plan if impaired  - Assess patient's need for assistive devices and provide as appropriate  - Encourage maximum independence but intervene and supervise when necessary  ¯ Involve family in performance of ADLs  ¯ Assess for home care needs following discharge   ¯ Request OT consult to assist with ADL evaluation and planning for discharge  ¯ Provide patient education as appropriate  Outcome: Progressing  Goal: Maintain or return mobility status to optimal level  Description  INTERVENTIONS:  - Assess patient's baseline mobility status (ambulation, transfers, stairs, etc )    - Identify cognitive and physical deficits and behaviors that affect mobility  - Identify mobility aids required to assist with transfers and/or ambulation (gait belt, sit-to-stand, lift, walker, cane, etc )  - [place] fall precautions as indicated by assessment  - Record patient progress and toleration of activity level on Mobility SBAR; progress patient to next Phase/Stage  - Instruct patient to call for assistance with activity based on assessment  - Request Rehabilitation consult to assist with strengthening/weightbearing, etc   Outcome: Progressing     Problem: DISCHARGE PLANNING  Goal: Discharge to home or other facility with appropriate resources  Description  INTERVENTIONS:  - Identify barriers to discharge w/patient and caregiver  - Arrange for needed discharge resources and transportation as appropriate  - Identify discharge learning needs (meds, wound care, etc )  - Arrange for interpretive services to assist at discharge as needed  - Refer to Case Management Department for coordinating discharge planning if the patient needs post-hospital services based on physician/advanced practitioner order or complex needs related to functional status, cognitive ability, or social support system  Outcome: Progressing     Problem: Knowledge Deficit  Goal: Patient/family/caregiver demonstrates understanding of disease process, treatment plan, medications, and discharge instructions  Description  Complete learning assessment and assess knowledge base  Interventions:  - Provide teaching at level of understanding  - Provide teaching via preferred learning methods  Outcome: Progressing     Problem: NEUROSENSORY - ADULT  Goal: Achieves stable or improved neurological status  Description  INTERVENTIONS  - Monitor and report changes in neurological status  - Initiate measures to prevent increased intracranial pressure  - Maintain blood pressure and fluid volume within ordered parameters to optimize cerebral perfusion  - Monitor temperature, glucose, and sodium or any other associated labs   Initiate appropriate interventions as ordered  - Monitor for seizure activity   - Administer anti-seizure medications as ordered  Outcome: Progressing     Problem: METABOLIC, FLUID AND ELECTROLYTES - ADULT  Goal: Electrolytes maintained within normal limits  Description  INTERVENTIONS:  - Monitor labs and assess patient for signs and symptoms of electrolyte imbalances  - Administer electrolyte replacement as ordered  - Monitor response to electrolyte replacements, including repeat lab results as appropriate  - Instruct patient on fluid and nutrition as appropriate  Outcome: Progressing  Goal: Fluid balance maintained  Description  INTERVENTIONS:  - Monitor labs and assess for signs and symptoms of volume excess or deficit  - Monitor I/O and WT  - Instruct patient on fluid and nutrition as appropriate  Outcome: Progressing

## 2019-02-19 ENCOUNTER — HOSPITAL ENCOUNTER (OUTPATIENT)
Dept: INFUSION CENTER | Facility: HOSPITAL | Age: 34
Discharge: HOME | End: 2019-02-19

## 2019-02-19 LAB
ALBUMIN SERPL BCP-MCNC: 2.9 G/DL (ref 3.5–5)
ALP SERPL-CCNC: 40 U/L (ref 46–116)
ALT SERPL W P-5'-P-CCNC: 24 U/L (ref 12–78)
ANION GAP SERPL CALCULATED.3IONS-SCNC: 11 MMOL/L (ref 4–13)
AST SERPL W P-5'-P-CCNC: 21 U/L (ref 5–45)
BASOPHILS # BLD AUTO: 0.04 THOUSANDS/ΜL (ref 0–0.1)
BASOPHILS NFR BLD AUTO: 1 % (ref 0–1)
BILIRUB SERPL-MCNC: 0.2 MG/DL (ref 0.2–1)
BUN SERPL-MCNC: 12 MG/DL (ref 5–25)
CALCIUM SERPL-MCNC: 7.8 MG/DL (ref 8.3–10.1)
CHLORIDE SERPL-SCNC: 110 MMOL/L (ref 100–108)
CO2 SERPL-SCNC: 22 MMOL/L (ref 21–32)
CREAT SERPL-MCNC: 0.81 MG/DL (ref 0.6–1.3)
EOSINOPHIL # BLD AUTO: 0.18 THOUSAND/ΜL (ref 0–0.61)
EOSINOPHIL NFR BLD AUTO: 3 % (ref 0–6)
ERYTHROCYTE [DISTWIDTH] IN BLOOD BY AUTOMATED COUNT: 13.5 % (ref 11.6–15.1)
GFR SERPL CREATININE-BSD FRML MDRD: 94 ML/MIN/1.73SQ M
GLUCOSE SERPL-MCNC: 75 MG/DL (ref 65–140)
HCT VFR BLD AUTO: 34.4 % (ref 34.8–46.1)
HGB BLD-MCNC: 10.8 G/DL (ref 11.5–15.4)
IMM GRANULOCYTES # BLD AUTO: 0.01 THOUSAND/UL (ref 0–0.2)
IMM GRANULOCYTES NFR BLD AUTO: 0 % (ref 0–2)
LYMPHOCYTES # BLD AUTO: 2.12 THOUSANDS/ΜL (ref 0.6–4.47)
LYMPHOCYTES NFR BLD AUTO: 29 % (ref 14–44)
MAGNESIUM SERPL-MCNC: 2 MG/DL (ref 1.6–2.6)
MCH RBC QN AUTO: 30.5 PG (ref 26.8–34.3)
MCHC RBC AUTO-ENTMCNC: 31.4 G/DL (ref 31.4–37.4)
MCV RBC AUTO: 97 FL (ref 82–98)
MONOCYTES # BLD AUTO: 0.5 THOUSAND/ΜL (ref 0.17–1.22)
MONOCYTES NFR BLD AUTO: 7 % (ref 4–12)
NEUTROPHILS # BLD AUTO: 4.45 THOUSANDS/ΜL (ref 1.85–7.62)
NEUTS SEG NFR BLD AUTO: 60 % (ref 43–75)
NRBC BLD AUTO-RTO: 0 /100 WBCS
PHOSPHATE SERPL-MCNC: 2.9 MG/DL (ref 2.7–4.5)
PLATELET # BLD AUTO: 244 THOUSANDS/UL (ref 149–390)
PMV BLD AUTO: 11 FL (ref 8.9–12.7)
POTASSIUM SERPL-SCNC: 3.3 MMOL/L (ref 3.5–5.3)
PROCALCITONIN SERPL-MCNC: <0.05 NG/ML
PROT SERPL-MCNC: 6 G/DL (ref 6.4–8.2)
RBC # BLD AUTO: 3.54 MILLION/UL (ref 3.81–5.12)
SODIUM SERPL-SCNC: 143 MMOL/L (ref 136–145)
WBC # BLD AUTO: 7.3 THOUSAND/UL (ref 4.31–10.16)

## 2019-02-19 PROCEDURE — 99232 SBSQ HOSP IP/OBS MODERATE 35: CPT | Performed by: INTERNAL MEDICINE

## 2019-02-19 PROCEDURE — 84100 ASSAY OF PHOSPHORUS: CPT | Performed by: STUDENT IN AN ORGANIZED HEALTH CARE EDUCATION/TRAINING PROGRAM

## 2019-02-19 PROCEDURE — 80053 COMPREHEN METABOLIC PANEL: CPT | Performed by: INTERNAL MEDICINE

## 2019-02-19 PROCEDURE — 85025 COMPLETE CBC W/AUTO DIFF WBC: CPT | Performed by: STUDENT IN AN ORGANIZED HEALTH CARE EDUCATION/TRAINING PROGRAM

## 2019-02-19 PROCEDURE — 84145 PROCALCITONIN (PCT): CPT | Performed by: INTERNAL MEDICINE

## 2019-02-19 PROCEDURE — 83735 ASSAY OF MAGNESIUM: CPT | Performed by: STUDENT IN AN ORGANIZED HEALTH CARE EDUCATION/TRAINING PROGRAM

## 2019-02-19 RX ORDER — POTASSIUM CHLORIDE 20MEQ/15ML
40 LIQUID (ML) ORAL
Status: DISCONTINUED | OUTPATIENT
Start: 2019-02-19 | End: 2019-02-20 | Stop reason: HOSPADM

## 2019-02-19 RX ORDER — POTASSIUM CHLORIDE 14.9 MG/ML
20 INJECTION INTRAVENOUS
Status: COMPLETED | OUTPATIENT
Start: 2019-02-19 | End: 2019-02-19

## 2019-02-19 RX ADMIN — MECLIZINE HYDROCHLORIDE 25 MG: 25 TABLET ORAL at 22:07

## 2019-02-19 RX ADMIN — Medication 50 MG: at 09:50

## 2019-02-19 RX ADMIN — POTASSIUM CHLORIDE 20 MEQ: 200 INJECTION, SOLUTION INTRAVENOUS at 15:42

## 2019-02-19 RX ADMIN — POTASSIUM CHLORIDE 40 MEQ: 20 SOLUTION ORAL at 09:48

## 2019-02-19 RX ADMIN — Medication 100 MG: at 08:11

## 2019-02-19 RX ADMIN — POTASSIUM CHLORIDE 20 MEQ: 200 INJECTION, SOLUTION INTRAVENOUS at 08:15

## 2019-02-19 RX ADMIN — POTASSIUM CHLORIDE 40 MEQ: 20 SOLUTION ORAL at 19:55

## 2019-02-19 RX ADMIN — AMITRIPTYLINE HYDROCHLORIDE 50 MG: 50 TABLET, FILM COATED ORAL at 22:07

## 2019-02-19 RX ADMIN — POTASSIUM CHLORIDE 20 MEQ: 200 INJECTION, SOLUTION INTRAVENOUS at 10:53

## 2019-02-19 RX ADMIN — MAGNESIUM OXIDE TAB 400 MG (241.3 MG ELEMENTAL MG) 400 MG: 400 (241.3 MG) TAB at 08:11

## 2019-02-19 RX ADMIN — FERROUS SULFATE TAB 325 MG (65 MG ELEMENTAL FE) 325 MG: 325 (65 FE) TAB at 17:05

## 2019-02-19 RX ADMIN — POTASSIUM CHLORIDE AND SODIUM CHLORIDE 100 ML/HR: 900; 300 INJECTION, SOLUTION INTRAVENOUS at 05:57

## 2019-02-19 RX ADMIN — FERROUS SULFATE TAB 325 MG (65 MG ELEMENTAL FE) 325 MG: 325 (65 FE) TAB at 08:11

## 2019-02-19 RX ADMIN — POTASSIUM CHLORIDE 40 MEQ: 20 SOLUTION ORAL at 15:41

## 2019-02-19 RX ADMIN — SODIUM BICARBONATE 650 MG: 650 TABLET ORAL at 08:11

## 2019-02-19 RX ADMIN — POTASSIUM CHLORIDE 20 MEQ: 200 INJECTION, SOLUTION INTRAVENOUS at 13:25

## 2019-02-19 RX ADMIN — SODIUM BICARBONATE 650 MG: 650 TABLET ORAL at 17:05

## 2019-02-19 RX ADMIN — POTASSIUM CHLORIDE 40 MEQ: 20 SOLUTION ORAL at 14:19

## 2019-02-19 RX ADMIN — POTASSIUM CHLORIDE 40 MEQ: 20 SOLUTION ORAL at 22:07

## 2019-02-19 RX ADMIN — POTASSIUM CHLORIDE 40 MEQ: 20 SOLUTION ORAL at 06:00

## 2019-02-19 NOTE — SOCIAL WORK
Evaluated the pt at the bedside  Pt reports she lives in a one story home with 0 CARLOS  Pt indicated she is independent with ADL's and IADL's  Pt states she gets infusions twice per week at the infusion center at [facility]  Pt indicated she also needs to get lab work prior to the infusions  Pt reports her PCP is Elie Rowe,  Pt reports she gets her medications from [facility] in [facility]  Spouse will transport home upon discharge  CM reviewed d/c planning process including the following: identifying help at home, patient preference for d/c planning needs, availability of treatment team to discuss questions or concerns patient and/or family may have regarding understanding medications and recognizing signs and symptoms once discharged  CM also encouraged patient to follow up with all recommended appointments after discharge  Patient advised of importance for patient and family to participate in managing patients medical well being

## 2019-02-19 NOTE — ASSESSMENT & PLAN NOTE
Patient reported worsening facial and upper and lower extremities numbness and tingling started since last Thursday  - due to hypokalemia  - IV and PO potassium replacement, follow nephrology recommendation  - 2/19/19: facial paresthesia subsided today, still reports bilateral hands paresthesia

## 2019-02-19 NOTE — NURSING NOTE
In the process of collecting 24Hour urine   Will end at 0600 2/20  Pt aware that all urine must be collected for accurate results  Fluids encouraged

## 2019-02-19 NOTE — ASSESSMENT & PLAN NOTE
Patient presented with symptomatic hypokalemia, potassium recorded at 2 6 on 2/15/19 while patient was receiving potassium infusion therapy  - obtain nephrology consult, follow nephrology recommendation on potassium replacement  - patient currently takes potassium chloride 40 mEq PO 5 times daily home regimen  - monitor potassium and magnesium level  - EKG to look for changes and r/o arrhythmias associated with hypokalemia  2/19/19: K+ improves to 3 3 today after total of KCl 60 mEq IV given yesterday  - Check 24 hour urine K+ to r/o urinary loss of potassium

## 2019-02-19 NOTE — PROGRESS NOTES
Progress Note - Dannielle Anderson 1985, 27 y o  female MRN: [id number]    Unit/Bed#: 424-01 Encounter: [id number]    Primary Care Provider: Laura Benavides MD   Date and time admitted to hospital: 2/18/2019  4:32 PM        * Hypokalemia  Assessment & Plan  Patient presented with symptomatic hypokalemia, potassium recorded at 2 6 on 2/15/19 while patient was receiving potassium infusion therapy  - obtain nephrology consult, follow nephrology recommendation on potassium replacement  - patient currently takes potassium chloride 40 mEq PO 5 times daily home regimen  - monitor potassium and magnesium level  - EKG to look for changes and r/o arrhythmias associated with hypokalemia  2/19/19: K+ improves to 3 3 today after total of KCl 60 mEq IV given yesterday  - Check 24 hour urine K+ to r/o urinary loss of potassium      Paresthesias  Assessment & Plan  Patient reported worsening facial and upper and lower extremities numbness and tingling started since last Thursday  - due to hypokalemia  - IV and PO potassium replacement, follow nephrology recommendation  - 2/19/19: facial paresthesia subsided today, still reports bilateral hands paresthesia        Subjective:   Patient seen and examined at bedside today  Patient states that her facial numbness and tingling is gone today but still reports bilateral hand paresthesia  Patient denied chest tightness, weakness, chest pain, SOB, abdominal pain, N/V  Patient remains afebrile and hemodynamically stable  Potassium level improves to 3 3 today  Objective:     Vitals: Blood pressure 111/57, pulse 81, temperature 97 6 °F (36 4 °C), temperature source Temporal, resp  rate 18, height 5' 3" (1 6 m), weight 70 6 kg (155 lb 10 3 oz), SpO2 100 %  ,Body mass index is 27 57 kg/m²    Wt Readings from Last 3 Encounters:   02/18/19 70 6 kg (155 lb 10 3 oz)   02/05/19 69 5 kg (153 lb 3 5 oz)   01/31/19 71 5 kg (157 lb 10 1 oz)       Intake/Output Summary (Last 24 hours) at 2/19/2019 [facility] filed at 2/19/2019 0901  Gross per 24 hour   Intake 1791 67 ml   Output 200 ml   Net 1591 67 ml       Physical Exam:     Physical Exam   Constitutional: No distress  HENT:   Head: Normocephalic  Eyes: Pupils are equal, round, and reactive to light  Right eye exhibits no discharge  Left eye exhibits no discharge  Neck: Normal range of motion  Cardiovascular: Normal rate, regular rhythm, normal heart sounds and intact distal pulses  No murmur heard  Pulmonary/Chest: Effort normal and breath sounds normal  No respiratory distress  She has no wheezes  Abdominal: Soft  Bowel sounds are normal  She exhibits no distension  There is no tenderness  Musculoskeletal: Normal range of motion  She exhibits no edema or tenderness  Neurological: She is alert  Skin: Skin is warm  She is not diaphoretic  Psychiatric: She has a normal mood and affect          Recent Results (from the past 24 hour(s))   CBC and differential    Collection Time: 02/18/19  7:33 PM   Result Value Ref Range    WBC 8 13 4 31 - 10 16 Thousand/uL    RBC 3 60 (L) 3 81 - 5 12 Million/uL    Hemoglobin 10 9 (L) 11 5 - 15 4 g/dL    Hematocrit 34 4 (L) 34 8 - 46 1 %    MCV 96 82 - 98 fL    MCH 30 3 26 8 - 34 3 pg    MCHC 31 7 31 4 - 37 4 g/dL    RDW 13 3 11 6 - 15 1 %    MPV 10 4 8 9 - 12 7 fL    Platelets 444 964 - 643 Thousands/uL    nRBC 0 /100 WBCs    Neutrophils Relative 57 43 - 75 %    Immat GRANS % 0 0 - 2 %    Lymphocytes Relative 33 14 - 44 %    Monocytes Relative 7 4 - 12 %    Eosinophils Relative 2 0 - 6 %    Basophils Relative 1 0 - 1 %    Neutrophils Absolute 4 69 1 85 - 7 62 Thousands/µL    Immature Grans Absolute 0 01 0 00 - 0 20 Thousand/uL    Lymphocytes Absolute 2 64 0 60 - 4 47 Thousands/µL    Monocytes Absolute 0 56 0 17 - 1 22 Thousand/µL    Eosinophils Absolute 0 17 0 00 - 0 61 Thousand/µL    Basophils Absolute 0 06 0 00 - 0 10 Thousands/µL   Magnesium    Collection Time: 02/18/19  7:33 PM   Result Value Ref Range    Magnesium 2 2 1 6 - 2 6 mg/dL   Comprehensive metabolic panel    Collection Time: 02/18/19  7:33 PM   Result Value Ref Range    Sodium 140 136 - 145 mmol/L    Potassium 3 0 (L) 3 5 - 5 3 mmol/L    Chloride 105 100 - 108 mmol/L    CO2 25 21 - 32 mmol/L    ANION GAP 10 4 - 13 mmol/L    BUN 13 5 - 25 mg/dL    Creatinine 0 96 0 60 - 1 30 mg/dL    Glucose 80 65 - 140 mg/dL    Calcium 8 2 (L) 8 3 - 10 1 mg/dL    AST 22 5 - 45 U/L    ALT 28 12 - 78 U/L    Alkaline Phosphatase 44 (L) 46 - 116 U/L    Total Protein 6 4 6 4 - 8 2 g/dL    Albumin 3 2 (L) 3 5 - 5 0 g/dL    Total Bilirubin 0 20 0 20 - 1 00 mg/dL    eGFR 77 ml/min/1 73sq m   Magnesium    Collection Time: 02/19/19  5:58 AM   Result Value Ref Range    Magnesium 2 0 1 6 - 2 6 mg/dL   Phosphorus    Collection Time: 02/19/19  5:58 AM   Result Value Ref Range    Phosphorus 2 9 2 7 - 4 5 mg/dL   CBC and differential    Collection Time: 02/19/19  5:58 AM   Result Value Ref Range    WBC 7 30 4 31 - 10 16 Thousand/uL    RBC 3 54 (L) 3 81 - 5 12 Million/uL    Hemoglobin 10 8 (L) 11 5 - 15 4 g/dL    Hematocrit 34 4 (L) 34 8 - 46 1 %    MCV 97 82 - 98 fL    MCH 30 5 26 8 - 34 3 pg    MCHC 31 4 31 4 - 37 4 g/dL    RDW 13 5 11 6 - 15 1 %    MPV 11 0 8 9 - 12 7 fL    Platelets 645 803 - 273 Thousands/uL    nRBC 0 /100 WBCs    Neutrophils Relative 60 43 - 75 %    Immat GRANS % 0 0 - 2 %    Lymphocytes Relative 29 14 - 44 %    Monocytes Relative 7 4 - 12 %    Eosinophils Relative 3 0 - 6 %    Basophils Relative 1 0 - 1 %    Neutrophils Absolute 4 45 1 85 - 7 62 Thousands/µL    Immature Grans Absolute 0 01 0 00 - 0 20 Thousand/uL    Lymphocytes Absolute 2 12 0 60 - 4 47 Thousands/µL    Monocytes Absolute 0 50 0 17 - 1 22 Thousand/µL    Eosinophils Absolute 0 18 0 00 - 0 61 Thousand/µL    Basophils Absolute 0 04 0 00 - 0 10 Thousands/µL   Comprehensive metabolic panel    Collection Time: 02/19/19  5:58 AM   Result Value Ref Range    Sodium 143 136 - 145 mmol/L    Potassium 3 3 (L) 3 5 - 5 3 mmol/L    Chloride 110 (H) 100 - 108 mmol/L    CO2 22 21 - 32 mmol/L    ANION GAP 11 4 - 13 mmol/L    BUN 12 5 - 25 mg/dL    Creatinine 0 81 0 60 - 1 30 mg/dL    Glucose 75 65 - 140 mg/dL    Calcium 7 8 (L) 8 3 - 10 1 mg/dL    AST 21 5 - 45 U/L    ALT 24 12 - 78 U/L    Alkaline Phosphatase 40 (L) 46 - 116 U/L    Total Protein 6 0 (L) 6 4 - 8 2 g/dL    Albumin 2 9 (L) 3 5 - 5 0 g/dL    Total Bilirubin 0 20 0 20 - 1 00 mg/dL    eGFR 94 ml/min/1 73sq m       Current Facility-Administered Medications   Medication Dose Route Frequency Provider Last Rate Last Dose    amitriptyline (ELAVIL) tablet 50 mg  50 mg Oral HS John Pollock MD   50 mg at 02/18/19 2130    enoxaparin (LOVENOX) subcutaneous injection 40 mg  40 mg Subcutaneous Q24H [facility] John Pollock MD        ferrous sulfate tablet 325 mg  325 mg Oral BID With Meals John Pollock MD   325 mg at 02/19/19 0811    magnesium oxide (MAG-OX) tablet 400 mg  400 mg Oral Daily John Pollock MD   400 mg at 02/19/19 8718    meclizine (ANTIVERT) tablet 25 mg  25 mg Oral HS John Pollock MD   25 mg at 02/18/19 2130    potassium chloride 10 % oral solution 40 mEq  40 mEq Oral 6x Daily Gold Holcomb MD   40 mEq at 02/19/19 0948    potassium chloride 20 mEq IVPB (premix)  20 mEq Intravenous [facility], DO 50 mL/hr at 02/19/19 0815 20 mEq at 02/19/19 0815    sodium bicarbonate tablet 650 mg  650 mg Oral BID John Pollock MD   650 mg at 02/19/19 6844    sodium chloride 0 9 % with KCl 40 mEq/L infusion (premix)  100 mL/hr Intravenous Continuous [employer], DO   Stopped at 02/19/19 0840    thiamine (VITAMIN B1) tablet 100 mg  100 mg Oral Daily John Pollock MD   100 mg at 02/19/19 9158    zinc gluconate tablet 50 mg  50 mg Oral Daily John Pollock MD   50 mg at 02/19/19 8649     Facility-Administered Medications Ordered in Other Encounters   Medication Dose Route Frequency Provider Last Rate Last Dose  potassium chloride 40 mEq in sodium chloride 0 9 % 250 mL IVPB   Intravenous Once [employer], DO        sodium chloride 0 9 % infusion  20 mL/hr Intravenous Continuous [employer], DO           Invasive Devices     Central Venous Catheter Line            Port A Cath 02/18/19 Right Chest less than 1 day                Lab, Imaging and other studies: I have personally reviewed pertinent reports      VTE Pharmacologic Prophylaxis: Enoxaparin (Lovenox)  VTE Mechanical Prophylaxis: reason for no mechanical VTE prophylaxis patient ambulates     John Peralta MD

## 2019-02-19 NOTE — UTILIZATION REVIEW
Initial Clinical Review    Admission: Date/Time/Statement: 2/18/19 @ 1632   Orders Placed This Encounter   Procedures    Inpatient Admission     Standing Status:   Standing     Number of Occurrences:   1     Order Specific Question:   Admitting Physician     Answer:   [place] Single     Order Specific Question:   Level of Care     Answer:   Med Surg [16]     Order Specific Question:   Estimated length of stay     Answer:   More than 2 Midnights     Order Specific Question:   Certification     Answer:   I certify that inpatient services are medically necessary for this patient for a duration of greater than two midnights  See H&P and MD Progress Notes for additional information about the patient's course of treatment  ED: Date/Time/Mode of Arrival:   ED Arrival Information     Patient not seen in ED                     Chief Complaint: No chief complaint on file      History of Illness: 27 yr old female presents with general weakness, numbness and ingling over all of her body- started Thursday night with bilateral feet cramping -- has general fatigue -- also reports chest tightness like something is sitting on her chest-- she receives K+ infusion once a week  ED Vital Signs:   ED Triage Vitals   Temperature Pulse Respirations Blood Pressure SpO2   02/18/19 1641 02/18/19 1641 02/18/19 1641 02/18/19 1641 02/18/19 1641   97 8 °F (36 6 °C) 88 18 129/72 100 %      Temp Source Heart Rate Source Patient Position - Orthostatic VS BP Location FiO2 (%)   02/18/19 1641 02/19/19 0711 02/18/19 1641 02/18/19 1641 --   Temporal Monitor Sitting Left arm       Pain Score       02/18/19 1647       No Pain        Wt Readings from Last 1 Encounters:   02/18/19 70 6 kg (155 lb 10 3 oz)     Vital Signs (abnormal): bp 96/48  Pertinent Labs/Diagnostic Test Results: k+ 3 0--ca 8 2--alk phos 44--alb   hgb 10 9/34 4  ED Treatment:   Medication Administration - No Administrations Displayed (No Start Event Found)     None Past Medical/Surgical History: Active Ambulatory Problems     Diagnosis Date Noted    Hypokalemia 07/07/2018    History of gastric bypass 07/07/2018    Migraine without aura and without status migrainosus, not intractable 07/07/2018    Left-sided weakness 07/07/2018    Hypophosphatemia 07/09/2018    Anemia 07/10/2018    Vitamin D deficiency 07/10/2018    Elevated CPK 07/11/2018    Vitamin B12 deficiency 07/11/2018    Cervical lymphadenopathy 07/11/2018    Generalized weakness 07/12/2018    Paresthesia of both lower extremities 08/15/2018    Paresthesias 08/15/2018    B12 deficiency 01/17/2016    Gastric bypass status for obesity 10/02/2018    GERD (gastroesophageal reflux disease) 09/03/2018    Migraine 05/29/2014    WAGNER (obstructive sleep apnea) 06/11/2014    Other intestinal malabsorption 10/05/2018    Acute kidney injury ([address] ) 12/18/2018    Right ovarian cyst 01/02/2019    Chest pain 01/02/2019     Resolved Ambulatory Problems     Diagnosis Date Noted    Non-traumatic rhabdomyolysis 07/07/2018    Transaminitis 07/07/2018    Hypocalcemia 07/09/2018    Hypernatremia 07/09/2018    Weakness of both lower extremities 07/11/2018    Weakness of both upper extremities 07/11/2018     Past Medical History:   Diagnosis Date    H  pylori infection     Hypokalemia     Migraine      Admitting Diagnosis: Acute hypokalemia [E87 6]  Age/Sex: [address] y o  female  Assessment/Plan: hypokalemia -- sympotomatic   Nephrology consult ekg look for changes  Paresthesias worsening facial and upper and lower extremities numbness and tingling since last Thursday  Iv and po replacements for k     Anticipated Length of Stay:  Patient will be admitted on an Inpatient basis with an anticipated length of stay of  More than 2 midnights           Admission Orders:  Scheduled Meds:   Current Facility-Administered Medications:  amitriptyline 50 mg Oral HS Erwin Martinez MD    enoxaparin 40 mg Subcutaneous Q24H [address] Starla Barfield MD    ferrous sulfate 325 mg Oral BID With Meals John Munroe MD    magnesium oxide 400 mg Oral Daily John Munroe MD    meclizine 25 mg Oral HS John Munroe MD    potassium chloride 40 mEq Oral 6x Daily Osbaldo Copeland MD    potassium chloride 20 mEq Intravenous Q2H [employer], DO Last Rate: 20 mEq (02/19/19 1325)   sodium bicarbonate 650 mg Oral BID John Munroe MD    sodium chloride 0 9 % with KCl 40 mEq/L 100 mL/hr Intravenous Continuous [employer], DO Last Rate: Stopped (02/19/19 0840)   thiamine 100 mg Oral Daily Starla Barfield MD    zinc gluconate 50 mg Oral Daily John Munroe MD      Facility-Administered Medications Ordered in Other Encounters:  IVPB builder  Intravenous Once Wolf Olegario, DO   sodium chloride 20 mL/hr Intravenous Continuous Juan Carlos Varvarelis, DO     Continuous Infusions:   sodium chloride 0 9 % with KCl 40 mEq/L 100 mL/hr Last Rate: Stopped (02/19/19 0840)     PRN Meds:   Nephrology consult--hypokalemia  K+ 3 3--cl 110--ca 7 8--alk phos 40--t   Pro 6 0--alb 2 9  hgb 10 8/34 4

## 2019-02-19 NOTE — PLAN OF CARE
Problem: DISCHARGE PLANNING - CARE MANAGEMENT  Goal: Discharge to post-acute care or home with appropriate resources  Description  INTERVENTIONS:  - Conduct assessment to determine patient/family and health care team treatment goals, and need for post-acute services based on payer coverage, community resources, and patient preferences, and barriers to discharge  - Address psychosocial, clinical, and financial barriers to discharge as identified in assessment in conjunction with the patient/family and health care team  - Arrange appropriate level of post-acute services according to patient's   needs and preference and payer coverage in collaboration with the physician and health care team  - Communicate with and update the patient/family, physician, and health care team regarding progress on the discharge plan  - Arrange appropriate transportation to post-acute venues  Discharge home without any services  Spouse will transport home     Outcome: Progressing

## 2019-02-19 NOTE — CONSULTS
Consultation - Nephrology   Regine Buenrostro 27 y o  female MRN: [id number]  Unit/Bed#: 424-01 Encounter: [id number]      A/P:  1  Hypokalemia: will check a urinary potassium again to see if she is spilling or conserving  If she is spilling, consideration could be given to amiloride  Will check urinary 24 hour potassium and if high will give amiloride 5mg and check urinary potassium spot samples daily for 2 days  (holding spironolactone due to low BP)  We tried spironolactone without success  She had a GI workup which was negative but [facility] did not think it was renal related  She takes vitamins but denies laxatives, diuretics and other supplements  She has heard of a "Diagnostic Dilemma" clinic at [facility] and we can present her case to this clinic  In the interim will give her IV KCl and oral KCL to increase her levels to 4     2  Hypoalbuminemia: concerning as her corrected calcium is within normal limits  She has lost 150 pounds with the gastric bypass in 2014 and strictly monitors her diet  She may not be taking enough protein  She may have malabsorption as well  3  Magnesium: WNL       Thank you for allowing us to participate in the care of your patient  Please feel free to contact us regarding the care of this patient, or any other questions/concerns that may be applicable      Patient Active Problem List   Diagnosis    Hypokalemia    History of gastric bypass    Migraine without aura and without status migrainosus, not intractable    Left-sided weakness    Hypophosphatemia    Anemia    Vitamin D deficiency    Elevated CPK    Vitamin B12 deficiency    Cervical lymphadenopathy    Generalized weakness    Paresthesia of both lower extremities    Paresthesias    B12 deficiency    Gastric bypass status for obesity    GERD (gastroesophageal reflux disease)    Migraine    WAGNER (obstructive sleep apnea)    Other intestinal malabsorption    Acute kidney injury ([address] )    Right ovarian cyst    Chest pain       History of Present Illness   Physician Requesting Consult: Amelie Randhawa DO  Reason for Consult / Principal Problem: hypokalemia  Hx and PE limited by:   HPI: Arlene Bowen is a 27y o  year old female who presents with hypokalemia and severe paresthesias with a potassium of 2 6  She has been receiving IV infusions weekly of KCl as well as oral potassium  She had a complete workup for surreptitious diuretics, laxatives ad bulimia  The only finding was kalliuresis un the face of hypokalemia  She had a workup at [facility] without any finding  History obtained from chart and patient    Review of Systems - Negative except as mentioned above in HPI, more specifics as mentioned below  Review of Systems - Denies headaches, dizziness, chest pain, dyspnea, abdominal pain NVDC or dysuria  Has vibratory discomfort in hands and feet  Historical Information   Past Medical History:   Diagnosis Date    H  pylori infection     Hypokalemia     Migraine      Past Surgical History:   Procedure Laterality Date    ABDOMINAL SURGERY      APPENDECTOMY      CHOLECYSTECTOMY      FL GUIDED CENTRAL VENOUS ACCESS REPLACEMENT  12/21/2018    GASTRIC BYPASS      HYSTERECTOMY      TUNNELED VENOUS PORT PLACEMENT N/A 12/21/2018    Procedure: INSERTION VENOUS PORT (PORT-A-CATH);   Surgeon: Alex Orlando DO;  Location: [facility];  Service: General     Social History   Social History     Substance and Sexual Activity   Alcohol Use Never    Alcohol/week: 0 0 oz    Frequency: Never    Drinks per session: Patient refused    Binge frequency: Never     Social History     Substance and Sexual Activity   Drug Use No     Social History     Tobacco Use   Smoking Status Never Smoker   Smokeless Tobacco Never Used     Family History   Problem Relation Age of Onset    Hypertension Father     Diabetes Father     Diabetes Maternal Grandfather        Meds/Allergies   all current active meds have been reviewed, current meds:   Current Facility-Administered Medications   Medication Dose Route Frequency    amitriptyline (ELAVIL) tablet 50 mg  50 mg Oral HS    enoxaparin (LOVENOX) subcutaneous injection 40 mg  40 mg Subcutaneous Q24H [facility]    ferrous sulfate tablet 325 mg  325 mg Oral BID With Meals    magnesium oxide (MAG-OX) tablet 400 mg  400 mg Oral Daily    meclizine (ANTIVERT) tablet 25 mg  25 mg Oral HS    potassium chloride 10 % oral solution 40 mEq  40 mEq Oral 6x Daily    potassium chloride 20 mEq IVPB (premix)  20 mEq Intravenous Q2H    sodium bicarbonate tablet 650 mg  650 mg Oral BID    sodium chloride 0 9 % with KCl 40 mEq/L infusion (premix)  100 mL/hr Intravenous Continuous    thiamine (VITAMIN B1) tablet 100 mg  100 mg Oral Daily    zinc gluconate tablet 50 mg  50 mg Oral Daily     Facility-Administered Medications Ordered in Other Encounters   Medication Dose Route Frequency    potassium chloride 40 mEq in sodium chloride 0 9 % 250 mL IVPB   Intravenous Once    sodium chloride 0 9 % infusion  20 mL/hr Intravenous Continuous    and PTA meds:    Medications Prior to Admission   Medication    amitriptyline (ELAVIL) 50 mg tablet    b complex-C-folic acid (NEPHRO-EDIE) 0 8 mg tablet    ergocalciferol (ERGOCALCIFEROL) 11268 units capsule    ferrous sulfate 325 (65 Fe) mg tablet    magnesium oxide (MAG-OX) 400 mg    meclizine (ANTIVERT) 25 mg tablet    potassium chloride 10 %    sodium bicarbonate 650 mg tablet    thiamine 100 MG tablet    zinc gluconate 50 mg tablet    acetaminophen (TYLENOL) 325 mg tablet    enoxaparin (LOVENOX) 40 mg/0 4 mL         Allergies   Allergen Reactions    Nsaids      Other reaction(s):  Other (Please comment)  Should not take s/p bariatric surgery       Objective     Intake/Output Summary (Last 24 hours) at 2/19/2019 1125  Last data filed at 2/19/2019 0901  Gross per 24 hour   Intake 1791 67 ml   Output 200 ml   Net 1591 67 ml       Invasive Devices: Physical Exam      I/O last 3 completed shifts: In: 980 [I V :980]  Out: 150 [Urine:150]    Vitals:    02/19/19 0711   BP: 111/57   Pulse: 81   Resp: 18   Temp: 97 6 °F (36 4 °C)   SpO2: 100%       Gen: in NAD, Alert/Awake  HEENT: no sclerous icterus, MMM, neck supple  CV: +S1/S2, RRR  Lungs: CTA bilaterally  Abd: +BS, soft NT/ND  Ext: all four extremities are warm  Skin: no rashes noted  Neuro: CN II-XII intact    Current Weight: Weight - Scale: 70 6 kg (155 lb 10 3 oz)  First Weight: Weight - Scale: 70 6 kg (155 lb 10 3 oz)    Lab Results:  I have personally reviewed pertinent labs      CBC:   Lab Results   Component Value Date    WBC 7 30 02/19/2019    HGB 10 8 (L) 02/19/2019    HCT 34 4 (L) 02/19/2019    MCV 97 02/19/2019     02/19/2019    MCH 30 5 02/19/2019    MCHC 31 4 02/19/2019    RDW 13 5 02/19/2019    MPV 11 0 02/19/2019    NRBC 0 02/19/2019     CMP:   Lab Results   Component Value Date    K 3 3 (L) 02/19/2019     (H) 02/19/2019    CO2 22 02/19/2019    BUN 12 02/19/2019    CREATININE 0 81 02/19/2019    CALCIUM 7 8 (L) 02/19/2019    AST 21 02/19/2019    ALT 24 02/19/2019    ALKPHOS 40 (L) 02/19/2019    EGFR 94 02/19/2019     Phosphorus:   Lab Results   Component Value Date    PHOS 2 9 02/19/2019     Magnesium:   Lab Results   Component Value Date    MG 2 0 02/19/2019     Urinalysis: No results found for: Edwin Bevel, SPECGRAV, PHUR, LEUKOCYTESUR, NITRITE, PROTEINUA, GLUCOSEU, KETONESU, BILIRUBINUR, BLOODU  Ionized Calcium: No results found for: CAION  Coagulation: No results found for: PT, INR, APTT  Troponin: No results found for: TROPONINI  ABG: No results found for: PHART, MQU5BHD, PO2ART, ZIE2CWQ, A1CRWDCY, BEART, SOURCE    Results from last 7 days   Lab Units 02/19/19  0558 02/18/19  1933 02/15/19  0820   POTASSIUM mmol/L 3 3* 3 0* 2 6*   CHLORIDE mmol/L 110* 105 106   CO2 mmol/L 22 25 24   BUN mg/dL 12 13 19   CREATININE mg/dL 0 81 0 96 0 97   CALCIUM mg/dL 7 8* 8 2* 8 1*   ALK PHOS U/L 40* 44*  --    ALT U/L 24 28  --    AST U/L 21 22  --        Radiology review:  No results found  EKG, Pathology, and Other Studies: reviewed      Counseling / Coordination of Care  Total ADDITIONAL floor / unit time spent today 30 minutes  Greater than 50% of total time was spent with the patient and / or family counseling and / or coordination of care   A description of the counseling / coordination of care: follows    Pradeep Ernandez MD

## 2019-02-20 VITALS
SYSTOLIC BLOOD PRESSURE: 102 MMHG | BODY MASS INDEX: 27.58 KG/M2 | DIASTOLIC BLOOD PRESSURE: 62 MMHG | TEMPERATURE: 98.5 F | HEART RATE: 82 BPM | WEIGHT: 155.65 LBS | HEIGHT: 63 IN | OXYGEN SATURATION: 100 % | RESPIRATION RATE: 18 BRPM

## 2019-02-20 LAB
ALBUMIN SERPL BCP-MCNC: 2.6 G/DL (ref 3.5–5)
ALP SERPL-CCNC: 34 U/L (ref 46–116)
ALT SERPL W P-5'-P-CCNC: 24 U/L (ref 12–78)
ANION GAP SERPL CALCULATED.3IONS-SCNC: 10 MMOL/L (ref 4–13)
AST SERPL W P-5'-P-CCNC: 20 U/L (ref 5–45)
ATRIAL RATE: 82 BPM
B-HCG SERPL-ACNC: <2 MIU/ML
BASOPHILS # BLD AUTO: 0.04 THOUSANDS/ΜL (ref 0–0.1)
BASOPHILS NFR BLD AUTO: 1 % (ref 0–1)
BILIRUB SERPL-MCNC: 0.2 MG/DL (ref 0.2–1)
BUN SERPL-MCNC: 8 MG/DL (ref 5–25)
CALCIUM 24H UR-MCNC: 132.3 MG/24 HRS (ref 42–353)
CALCIUM SERPL-MCNC: 7.6 MG/DL (ref 8.3–10.1)
CHLORIDE SERPL-SCNC: 112 MMOL/L (ref 100–108)
CO2 SERPL-SCNC: 19 MMOL/L (ref 21–32)
CREAT 24H UR-MRATE: 1 G/24HR (ref 0.6–1.8)
CREAT CL 24H UR+SERPL-VRATE: 99.68 ML/MIN (ref 75–115)
CREAT SERPL-MCNC: 0.67 MG/DL (ref 0.6–1.3)
CREAT UR-MCNC: 73.3 MG/DL
EOSINOPHIL # BLD AUTO: 0.18 THOUSAND/ΜL (ref 0–0.61)
EOSINOPHIL NFR BLD AUTO: 3 % (ref 0–6)
ERYTHROCYTE [DISTWIDTH] IN BLOOD BY AUTOMATED COUNT: 13.5 % (ref 11.6–15.1)
GFR SERPL CREATININE-BSD FRML MDRD: 114 ML/MIN/1.73SQ M
GLUCOSE SERPL-MCNC: 78 MG/DL (ref 65–140)
HCT VFR BLD AUTO: 34.5 % (ref 34.8–46.1)
HGB BLD-MCNC: 10.7 G/DL (ref 11.5–15.4)
IMM GRANULOCYTES # BLD AUTO: 0.01 THOUSAND/UL (ref 0–0.2)
IMM GRANULOCYTES NFR BLD AUTO: 0 % (ref 0–2)
LYMPHOCYTES # BLD AUTO: 2.02 THOUSANDS/ΜL (ref 0.6–4.47)
LYMPHOCYTES NFR BLD AUTO: 30 % (ref 14–44)
MAGNESIUM SERPL-MCNC: 1.9 MG/DL (ref 1.6–2.6)
MCH RBC QN AUTO: 30.7 PG (ref 26.8–34.3)
MCHC RBC AUTO-ENTMCNC: 31 G/DL (ref 31.4–37.4)
MCV RBC AUTO: 99 FL (ref 82–98)
MONOCYTES # BLD AUTO: 0.47 THOUSAND/ΜL (ref 0.17–1.22)
MONOCYTES NFR BLD AUTO: 7 % (ref 4–12)
NEUTROPHILS # BLD AUTO: 3.96 THOUSANDS/ΜL (ref 1.85–7.62)
NEUTS SEG NFR BLD AUTO: 59 % (ref 43–75)
NRBC BLD AUTO-RTO: 0 /100 WBCS
P AXIS: -6 DEGREES
PERIOD: 24 HOURS
PHOSPHATE SERPL-MCNC: 3 MG/DL (ref 2.7–4.5)
PLATELET # BLD AUTO: 232 THOUSANDS/UL (ref 149–390)
PMV BLD AUTO: 10.5 FL (ref 8.9–12.7)
POTASSIUM 24H UR-SCNC: 11.34 MMOL/24 HRS (ref 25–150)
POTASSIUM SERPL-SCNC: 3.7 MMOL/L (ref 3.5–5.3)
PR INTERVAL: 116 MS
PROCALCITONIN SERPL-MCNC: <0.05 NG/ML
PROT SERPL-MCNC: 5.5 G/DL (ref 6.4–8.2)
QRS AXIS: 4 DEGREES
QRSD INTERVAL: 100 MS
QT INTERVAL: 378 MS
QTC INTERVAL: 441 MS
RBC # BLD AUTO: 3.48 MILLION/UL (ref 3.81–5.12)
SODIUM 24H UR-SRATE: 56.7 MMOL/24 HRS (ref 40–220)
SODIUM SERPL-SCNC: 141 MMOL/L (ref 136–145)
SPECIMEN VOL UR: 1350 ML
T WAVE AXIS: 26 DEGREES
VENTRICULAR RATE: 82 BPM
WBC # BLD AUTO: 6.68 THOUSAND/UL (ref 4.31–10.16)

## 2019-02-20 PROCEDURE — 93010 ELECTROCARDIOGRAM REPORT: CPT | Performed by: INTERNAL MEDICINE

## 2019-02-20 PROCEDURE — 82340 ASSAY OF CALCIUM IN URINE: CPT | Performed by: INTERNAL MEDICINE

## 2019-02-20 PROCEDURE — 84133 ASSAY OF URINE POTASSIUM: CPT | Performed by: INTERNAL MEDICINE

## 2019-02-20 PROCEDURE — 99238 HOSP IP/OBS DSCHRG MGMT 30/<: CPT | Performed by: INTERNAL MEDICINE

## 2019-02-20 PROCEDURE — 83735 ASSAY OF MAGNESIUM: CPT | Performed by: INTERNAL MEDICINE

## 2019-02-20 PROCEDURE — 85025 COMPLETE CBC W/AUTO DIFF WBC: CPT | Performed by: INTERNAL MEDICINE

## 2019-02-20 PROCEDURE — 82575 CREATININE CLEARANCE TEST: CPT | Performed by: INTERNAL MEDICINE

## 2019-02-20 PROCEDURE — 84702 CHORIONIC GONADOTROPIN TEST: CPT | Performed by: INTERNAL MEDICINE

## 2019-02-20 PROCEDURE — 84145 PROCALCITONIN (PCT): CPT | Performed by: INTERNAL MEDICINE

## 2019-02-20 PROCEDURE — 84100 ASSAY OF PHOSPHORUS: CPT | Performed by: INTERNAL MEDICINE

## 2019-02-20 PROCEDURE — 84300 ASSAY OF URINE SODIUM: CPT | Performed by: INTERNAL MEDICINE

## 2019-02-20 PROCEDURE — 80053 COMPREHEN METABOLIC PANEL: CPT | Performed by: INTERNAL MEDICINE

## 2019-02-20 RX ORDER — AMILORIDE HYDROCHLORIDE 5 MG/1
5 TABLET ORAL DAILY
Qty: 30 TABLET | Refills: 0 | Status: SHIPPED | OUTPATIENT
Start: 2019-02-20

## 2019-02-20 RX ADMIN — FERROUS SULFATE TAB 325 MG (65 MG ELEMENTAL FE) 325 MG: 325 (65 FE) TAB at 09:34

## 2019-02-20 RX ADMIN — POTASSIUM CHLORIDE AND SODIUM CHLORIDE 100 ML/HR: 900; 300 INJECTION, SOLUTION INTRAVENOUS at 01:29

## 2019-02-20 RX ADMIN — Medication 50 MG: at 09:34

## 2019-02-20 RX ADMIN — Medication 100 MG: at 09:34

## 2019-02-20 RX ADMIN — POTASSIUM CHLORIDE 40 MEQ: 20 SOLUTION ORAL at 10:36

## 2019-02-20 RX ADMIN — POTASSIUM CHLORIDE 40 MEQ: 20 SOLUTION ORAL at 06:19

## 2019-02-20 RX ADMIN — SODIUM BICARBONATE 650 MG: 650 TABLET ORAL at 09:34

## 2019-02-20 RX ADMIN — MAGNESIUM OXIDE TAB 400 MG (241.3 MG ELEMENTAL MG) 400 MG: 400 (241.3 MG) TAB at 09:34

## 2019-02-20 NOTE — PROGRESS NOTES
Progress Note - Nephrology   Randi Aleman 27 y o  female MRN: [id number]  Unit/Bed#: 424-01 Encounter: [id number]    A/P:  1  Hypokalemia: She has had an exhaustive workup for hypokalemia  She again denies any use of OTC supplements  Will start amiloride 5mg a day and stop spironolactone  Will check urine potassium and bmp on Friday  She will skip her potassium infusion tomorrow but continue with two scheduled next week  We should have the results of the bmp and urine potassium by Saturday to direct further therapy  She will continue bicarbonate but stop spironolactone  2  Paresthesias: due to hypokalemia  3  Non aniongap metabolic acidosis: she will continue bicarbonate  She denies any GI losses at all or use of laxatives      Follow up reason for today's visit: Hypokalemia    Hypokalemia    Patient Active Problem List   Diagnosis    Hypokalemia    History of gastric bypass    Migraine without aura and without status migrainosus, not intractable    Left-sided weakness    Hypophosphatemia    Anemia    Vitamin D deficiency    Elevated CPK    Vitamin B12 deficiency    Cervical lymphadenopathy    Generalized weakness    Paresthesia of both lower extremities    Paresthesias    B12 deficiency    Gastric bypass status for obesity    GERD (gastroesophageal reflux disease)    Migraine    WAGNER (obstructive sleep apnea)    Other intestinal malabsorption    [facility] kidney [facility] ([facility] [address] )    Right ovarian cyst    Chest pain         Subjective:   Says the vibratory sense in hands are gone  Paresthesias markedly diminished  Remainder of ROSnegative    Objective:     Vitals: Blood pressure 102/62, pulse 82, temperature 98 5 °F (36 9 °C), temperature source Temporal, resp  rate 18, height 5' 3" (1 6 m), weight 70 6 kg (155 lb 10 3 oz), SpO2 100 %  ,Body mass index is 27 57 kg/m²      Weight (last 2 days)     Date/Time   Weight    02/18/19 1641   70 6 (155 65)                Intake/Output Summary (Last 24 hours) at 2/20/2019 0817  Last data filed at 2/20/2019 0129  Gross per 24 hour   Intake 2191 67 ml   Output 625 ml   Net 1566 67 ml     I/O last 3 completed shifts: In: 3171 7 [P O :960; I V :2211 7]  Out: 825 [Urine:825]         Physical Exam: /62 (BP Location: Left arm)   Pulse 82   Temp 98 5 °F (36 9 °C) (Temporal)   Resp 18   Ht 5' 3" (1 6 m)   Wt 70 6 kg (155 lb 10 3 oz)   SpO2 100%   BMI 27 57 kg/m²     General Appearance:    Alert, cooperative, no distress, appears stated age   Head:    Normocephalic, without obvious abnormality, atraumatic   Eyes:    Conjunctiva/corneas clear   Ears:    Normal external ears   Nose:   Nares normal, septum midline, mucosa normal, no drainage    or sinus tenderness   Throat:   Lips, mucosa, and tongue normal; teeth and gums normal   Neck:   Supple, symmetrical, trachea midline, no adenopathy;        thyroid:  No enlargement/tenderness/nodules; no carotid    bruit or JVD   Back:     Symmetric, no curvature, ROM normal, no CVA tenderness   Lungs:     Clear to auscultation bilaterally, respirations unlabored   Chest wall:    No tenderness or deformity   Heart:    Regular rate and rhythm, S1 and S2 normal, no murmur, rub   or gallop   Abdomen:     Soft, non-tender, bowel sounds active   Extremities:   Extremities normal, atraumatic, no cyanosis or edema   Skin:   Skin color, texture, turgor normal, no rashes or lesions   Lymph nodes:   Cervical normal   Neurologic:   CNII-XII intact            Lab, Imaging and other studies: I have personally reviewed pertinent labs    CBC:   Lab Results   Component Value Date    WBC 6 68 02/20/2019    HGB 10 7 (L) 02/20/2019    HCT 34 5 (L) 02/20/2019    MCV 99 (H) 02/20/2019     02/20/2019    MCH 30 7 02/20/2019    MCHC 31 0 (L) 02/20/2019    RDW 13 5 02/20/2019    MPV 10 5 02/20/2019    NRBC 0 02/20/2019     CMP:   Lab Results   Component Value Date    K 3 7 02/20/2019     (H) 02/20/2019    CO2 19 (L) 02/20/2019    BUN 8 02/20/2019    CREATININE 0 67 02/20/2019    CALCIUM 7 6 (L) 02/20/2019    AST 20 02/20/2019    ALT 24 02/20/2019    ALKPHOS 34 (L) 02/20/2019    EGFR 114 02/20/2019         Results from last 7 days   Lab Units 02/20/19  0623 02/19/19  0558 02/18/19  1933   POTASSIUM mmol/L 3 7 3 3* 3 0*   CHLORIDE mmol/L 112* 110* 105   CO2 mmol/L 19* 22 25   BUN mg/dL 8 12 13   CREATININE mg/dL 0 67 0 81 0 96   CALCIUM mg/dL 7 6* 7 8* 8 2*   ALK PHOS U/L 34* 40* 44*   ALT U/L 24 24 28   AST U/L 20 21 22         Phosphorus:   Lab Results   Component Value Date    PHOS 3 0 02/20/2019     Magnesium:   Lab Results   Component Value Date    MG 1 9 02/20/2019     Urinalysis: No results found for: COLORU, CLARITYU, SPECGRAV, PHUR, LEUKOCYTESUR, NITRITE, PROTEINUA, GLUCOSEU, KETONESU, BILIRUBINUR, BLOODU  Ionized Calcium: No results found for: CAION  Coagulation: No results found for: PT, INR, APTT  Troponin: No results found for: TROPONINI  ABG: No results found for: PHART, MKE3DVY, PO2ART, YAM7TGE, K4WOTDMR, BEART, SOURCE  Radiology review:     IMAGING  No results found      Current Facility-Administered Medications   Medication Dose Route Frequency    amitriptyline (ELAVIL) tablet 50 mg  50 mg Oral HS    enoxaparin (LOVENOX) subcutaneous injection 40 mg  40 mg Subcutaneous Q24H [facility]    ferrous sulfate tablet 325 mg  325 mg Oral BID With Meals    magnesium oxide (MAG-OX) tablet 400 mg  400 mg Oral Daily    meclizine (ANTIVERT) tablet 25 mg  25 mg Oral HS    potassium chloride 10 % oral solution 40 mEq  40 mEq Oral 6x Daily    sodium bicarbonate tablet 650 mg  650 mg Oral BID    sodium chloride 0 9 % with KCl 40 mEq/L infusion (premix)  100 mL/hr Intravenous Continuous    thiamine (VITAMIN B1) tablet 100 mg  100 mg Oral Daily    zinc gluconate tablet 50 mg  50 mg Oral Daily     Facility-Administered Medications Ordered in Other Encounters   Medication Dose Route Frequency    potassium chloride 40 mEq in sodium chloride 0 9 % 250 mL IVPB   Intravenous Once     Medications Discontinued During This Encounter   Medication Reason    potassium chloride 10 % oral solution 40 mEq        Danyel Joseph MD

## 2019-02-20 NOTE — ASSESSMENT & PLAN NOTE
· Chronic hypokalemia  · Improved with PO and IV potassium chloride supplementation during the hospitalization  · A 24-hour urine potassium test was completed with results pending at the time of discharge  · The patient was seen in consultation by Nephrology  · Amiloride 5 mg PO Qdaily was initiated by Nephrology  · Spironolactone was discontinued  · Recheck a BMP, magnesium level, and a urine potassium level on Friday, 02/22/2019  · Continue IV potassium chloride infusions twice a week per Nephrology  · She will need close outpatient follow-up with Nephrology

## 2019-02-20 NOTE — DISCHARGE SUMMARY
Discharge- Hannah Min 1985, 27 y o  female MRN: [id number]    Unit/Bed#: 424-01 Encounter: [id number]    Primary Care Provider: Gavino Malcolm MD   Date and time admitted to hospital: 2/18/2019  4:32 PM        * Hypokalemia  Assessment & Plan  · Chronic hypokalemia  · Improved with PO and IV potassium chloride supplementation during the hospitalization  · A 24-hour urine potassium test was completed with results pending at the time of discharge  · The patient was seen in consultation by Nephrology  · Amiloride 5 mg PO Qdaily was initiated by Nephrology  · Spironolactone was discontinued  · Recheck a BMP, magnesium level, and a urine potassium level on Friday, 02/22/2019  · Continue IV potassium chloride infusions twice a week per Nephrology  · She will need close outpatient follow-up with Nephrology        Paresthesias  Assessment & Plan  · Secondary to hypokalemia  · The paresthesias mostly resolved by the time of discharge    Discharging Physician / Practitioner: [facility],   PCP: Gavino Malcolm MD  Admission Date: 02/18/19  Discharge Date: 02/20/19      Consultations During Hospital Stay:  · Nephrology    Procedures Performed:   · None    Significant Findings / Test Results:   ECG 12 lead   Order: [id number]   Status:  Final result   Visible to patient:  Yes (MyChart)   Next appt:  02/27/2019 at 11:30 AM in Infusion Therapy ([facility])    Ref Range & Units 2/18/19 1738   Ventricular Rate BPM 82    Atrial Rate BPM 82    FL Interval ms 116    QRSD Interval ms 100    QT Interval ms 378    QTC Interval ms 441    P Axis degrees -6    QRS Axis degrees 4    T Wave Axis degrees 26       Narrative     Normal sinus rhythm  Normal ECG  When compared with ECG of 05-FEB-2019 15:41,  T wave inversion no longer evident in Anterior leads  Confirmed by Stevan Kennedy ([id number]) on 2/20/2019 9:57:08 AM      Specimen Collected: 02/18/19 17:38   Last Resulted: 02/20/19 09:57               Incidental Findings: · None     Test Results Pending at Discharge (will require follow up):   · 24-hour urine potassium test  · 24-hour urine calcium test  · 24-hour urine sodium test  · 24-hour creatinine clearance test     Outpatient Tests Requested:  · BMP, magnesium level, and urine potassium level on Friday, 98/68/7251    Complications:  None    Reason for Admission:  Hypokalemia    Hospital Course: Charly Valencia is a 27 y o  female patient who originally presented to the hospital on 2/18/2019 due to hypokalemia  Please see above list of diagnoses and related plan for additional information  Condition at Discharge: good     Discharge Day Visit / Exam:     Subjective: The patient was seen and examined  The patient is doing better  The patient's extremity paresthesias have mostly resolved  No chest pain  No shortness of breath  No abdominal pain  No nausea or vomiting  Vitals: Blood Pressure: 102/62 (02/20/19 0732)  Pulse: 82 (02/20/19 0732)  Temperature: 98 5 °F (36 9 °C) (02/20/19 0732)  Temp Source: Temporal (02/20/19 0732)  Respirations: 18 (02/20/19 0732)  Height: 5' 3" (160 cm) (02/18/19 1641)  Weight - Scale: 70 6 kg (155 lb 10 3 oz) (02/18/19 1641)  SpO2: 100 % (02/20/19 0732)  Exam:   Physical Exam  General:  NAD, awake, alert, follows commands  HEENT:  NC/AT, mucous membranes moist  Neck:  Supple, No JVP elevation  CV:  + S1, + S2, RRR  Pulm:  Lung fields are CTA bilaterally  Abd:  Soft, Non-tender, Non-distended  Ext:  No clubbing/cyanosis/edema  Skin:  No rashes  Neuro:  No focal deficits    Discharge instructions/Information to patient and family:   See after visit summary for information provided to patient and family  Provisions for Follow-Up Care:  See after visit summary for information related to follow-up care and any pertinent home health orders  Disposition:     Home    Planned Readmission:  No     Discharge Statement:  I spent 25 minutes discharging the patient   This time was spent on the day of discharge  I had direct contact with the patient on the day of discharge  Greater than 50% of the total time was spent examining patient, answering all patient questions, arranging and discussing plan of care with patient as well as directly providing post-discharge instructions  Additional time then spent on discharge activities  Discharge Medications:  See after visit summary for reconciled discharge medications provided to patient and family        ** Please Note: This note has been constructed using a voice recognition system **

## 2019-02-20 NOTE — SOCIAL WORK
Pt is being discharged today  Follow up appointments reviewed with patient with good understanding  Case Management reviewed discharge planning process including the following: identifying help that is needed at home, pt's preference for discharge needs and Meds at [facility]  Reviewed with Pt that any member of the healthcare team can answer questions regarding : medications, jmportance of recognizing  Signs and symptoms of any  medical problems   Case Management also encouraged pt to follow up with all recommended appointments after discharge /

## 2019-02-26 ENCOUNTER — HOSPITAL ENCOUNTER (OUTPATIENT)
Dept: INFUSION CENTER | Facility: HOSPITAL | Age: 34
Discharge: HOME | End: 2019-02-26
Payer: COMMERCIAL

## 2019-02-26 VITALS
RESPIRATION RATE: 18 BRPM | DIASTOLIC BLOOD PRESSURE: 84 MMHG | SYSTOLIC BLOOD PRESSURE: 125 MMHG | HEART RATE: 86 BPM | TEMPERATURE: 97.6 F

## 2019-02-26 LAB
ANION GAP SERPL CALCULATED.3IONS-SCNC: 14 MMOL/L (ref 4–13)
BUN SERPL-MCNC: 9 MG/DL (ref 5–25)
CALCIUM SERPL-MCNC: 8.2 MG/DL (ref 8.3–10.1)
CHLORIDE SERPL-SCNC: 108 MMOL/L (ref 100–108)
CO2 SERPL-SCNC: 18 MMOL/L (ref 21–32)
CREAT SERPL-MCNC: 0.86 MG/DL (ref 0.6–1.3)
GFR SERPL CREATININE-BSD FRML MDRD: 88 ML/MIN/1.73SQ M
GLUCOSE SERPL-MCNC: 79 MG/DL (ref 65–140)
POTASSIUM SERPL-SCNC: 2.7 MMOL/L (ref 3.5–5.3)
SODIUM SERPL-SCNC: 140 MMOL/L (ref 136–145)

## 2019-02-26 PROCEDURE — 96365 THER/PROPH/DIAG IV INF INIT: CPT

## 2019-02-26 PROCEDURE — 80048 BASIC METABOLIC PNL TOTAL CA: CPT | Performed by: INTERNAL MEDICINE

## 2019-02-26 PROCEDURE — 96366 THER/PROPH/DIAG IV INF ADDON: CPT

## 2019-02-26 RX ORDER — SODIUM CHLORIDE 9 MG/ML
20 INJECTION, SOLUTION INTRAVENOUS CONTINUOUS
Status: DISCONTINUED | OUTPATIENT
Start: 2019-02-26 | End: 2019-03-01 | Stop reason: HOSPADM

## 2019-02-26 RX ORDER — POTASSIUM CHLORIDE 14.9 MG/ML
20 INJECTION INTRAVENOUS ONCE
Status: COMPLETED | OUTPATIENT
Start: 2019-02-26 | End: 2019-02-26

## 2019-02-26 RX ORDER — SODIUM CHLORIDE 9 MG/ML
20 INJECTION, SOLUTION INTRAVENOUS CONTINUOUS
Status: DISPENSED | OUTPATIENT
Start: 2019-02-28 | End: 2019-02-28

## 2019-02-26 RX ADMIN — POTASSIUM CHLORIDE 62.5 ML/HR: 149 INJECTION, SOLUTION, CONCENTRATE INTRAVENOUS at 09:09

## 2019-02-26 RX ADMIN — POTASSIUM CHLORIDE 20 MEQ: 200 INJECTION, SOLUTION INTRAVENOUS at 13:23

## 2019-02-26 RX ADMIN — SODIUM CHLORIDE 20 ML/HR: 0.9 INJECTION, SOLUTION INTRAVENOUS at 09:09

## 2019-02-26 NOTE — PROGRESS NOTES
Pt tolerated treatment without complications  Discharged in stable condition and aware to return on Thursday for next infusion appointment   Pt declined AVS

## 2019-02-26 NOTE — PLAN OF CARE
Problem: Potential for Falls  Goal: Patient will remain free of falls  Description  INTERVENTIONS:  - Assess patient frequently for physical needs  -  Identify cognitive and physical deficits and behaviors that affect risk of falls    -  [place] fall precautions as indicated by assessment   - Educate patient/family on patient safety including physical limitations  - Instruct patient to call for assistance with activity based on assessment  - Modify environment to reduce risk of injury  - Consider OT/PT consult to assist with strengthening/mobility  Outcome: Progressing

## 2019-02-26 NOTE — PROGRESS NOTES
Pt c/o tingling in her hands and feet - has not yet reached her mouth or face  Central labs drawn via port  Potassium level 2 7  Critical value called to Dr Shaw Tobin  Order received to administer 60mEq potassium chloride today  Pt made aware and is in agreement

## 2019-02-28 ENCOUNTER — HOSPITAL ENCOUNTER (OUTPATIENT)
Dept: INFUSION CENTER | Facility: HOSPITAL | Age: 34
Discharge: HOME | End: 2019-02-28
Payer: COMMERCIAL

## 2019-02-28 VITALS
HEART RATE: 87 BPM | SYSTOLIC BLOOD PRESSURE: 102 MMHG | DIASTOLIC BLOOD PRESSURE: 52 MMHG | RESPIRATION RATE: 18 BRPM | TEMPERATURE: 97.8 F | OXYGEN SATURATION: 98 %

## 2019-02-28 LAB
ANION GAP SERPL CALCULATED.3IONS-SCNC: 14 MMOL/L (ref 4–13)
BUN SERPL-MCNC: 9 MG/DL (ref 5–25)
CALCIUM SERPL-MCNC: 8.1 MG/DL (ref 8.3–10.1)
CHLORIDE SERPL-SCNC: 109 MMOL/L (ref 100–108)
CO2 SERPL-SCNC: 17 MMOL/L (ref 21–32)
CREAT SERPL-MCNC: 0.83 MG/DL (ref 0.6–1.3)
GFR SERPL CREATININE-BSD FRML MDRD: 92 ML/MIN/1.73SQ M
GLUCOSE SERPL-MCNC: 77 MG/DL (ref 65–140)
POTASSIUM SERPL-SCNC: 2.8 MMOL/L (ref 3.5–5.3)
SODIUM SERPL-SCNC: 140 MMOL/L (ref 136–145)

## 2019-02-28 PROCEDURE — 96365 THER/PROPH/DIAG IV INF INIT: CPT

## 2019-02-28 PROCEDURE — 96366 THER/PROPH/DIAG IV INF ADDON: CPT

## 2019-02-28 PROCEDURE — 80048 BASIC METABOLIC PNL TOTAL CA: CPT | Performed by: INTERNAL MEDICINE

## 2019-02-28 RX ORDER — POTASSIUM CHLORIDE 14.9 MG/ML
20 INJECTION INTRAVENOUS ONCE
Status: COMPLETED | OUTPATIENT
Start: 2019-02-28 | End: 2019-02-28

## 2019-02-28 RX ADMIN — SODIUM CHLORIDE 20 ML/HR: 0.9 INJECTION, SOLUTION INTRAVENOUS at 08:30

## 2019-02-28 RX ADMIN — POTASSIUM CHLORIDE 20 MEQ: 200 INJECTION, SOLUTION INTRAVENOUS at 13:13

## 2019-02-28 RX ADMIN — POTASSIUM CHLORIDE 62.5 ML/HR: 2 INJECTION, SOLUTION, CONCENTRATE INTRAVENOUS at 08:59

## 2019-02-28 NOTE — PLAN OF CARE
Problem: Potential for Falls  Goal: Patient will remain free of falls  Description  INTERVENTIONS:  - Assess patient frequently for physical needs  -  Identify cognitive and physical deficits and behaviors that affect risk of falls    -  [place] fall precautions as indicated by assessment   - Educate patient/family on patient safety including physical limitations  - Instruct patient to call for assistance with activity based on assessment  - Modify environment to reduce risk of injury  - Consider OT/PT consult to assist with strengthening/mobility  Outcome: Progressing

## 2019-02-28 NOTE — PROGRESS NOTES
1540 Patient tolerated infusion well  Patient states she feels well enough to go home   Patient discharged in stable condition

## 2019-03-01 RX ORDER — SODIUM CHLORIDE 9 MG/ML
20 INJECTION, SOLUTION INTRAVENOUS CONTINUOUS
Status: DISPENSED | OUTPATIENT
Start: 2019-03-04 | End: 2019-03-04

## 2019-03-04 ENCOUNTER — HOSPITAL ENCOUNTER (OUTPATIENT)
Dept: INFUSION CENTER | Facility: HOSPITAL | Age: 34
Discharge: HOME | End: 2019-03-04

## 2019-03-04 NOTE — PROGRESS NOTES
Patient cancelled hydration today due to weather which was to be done in short procedure   No available openings in center the rest of week    Arranged for patient to receive both  Hydration infusions   this week at [facility] in [place] one on Tuesday and one on Friday patient declined those 2 days spoke with damon in [facility] to see if any other days would work she will check schedule tomorrow and call back

## 2019-03-05 RX ORDER — SODIUM CHLORIDE 9 MG/ML
20 INJECTION, SOLUTION INTRAVENOUS CONTINUOUS
Status: DISPENSED | OUTPATIENT
Start: 2019-03-06 | End: 2019-03-06

## 2019-03-05 RX ORDER — SODIUM CHLORIDE 9 MG/ML
20 INJECTION, SOLUTION INTRAVENOUS CONTINUOUS
Status: CANCELLED | OUTPATIENT
Start: 2019-03-06 | End: 2019-03-06

## 2019-03-06 ENCOUNTER — HOSPITAL ENCOUNTER (OUTPATIENT)
Dept: INFUSION CENTER | Facility: HOSPITAL | Age: 34
Discharge: HOME | End: 2019-03-06
Payer: COMMERCIAL

## 2019-03-06 ENCOUNTER — HOSPITAL ENCOUNTER (OUTPATIENT)
Dept: INFUSION CENTER | Facility: HOSPITAL | Age: 34
Discharge: HOME | End: 2019-03-06

## 2019-03-06 VITALS
SYSTOLIC BLOOD PRESSURE: 116 MMHG | TEMPERATURE: 98.3 F | HEART RATE: 84 BPM | DIASTOLIC BLOOD PRESSURE: 58 MMHG | RESPIRATION RATE: 16 BRPM

## 2019-03-06 LAB
ANION GAP SERPL CALCULATED.3IONS-SCNC: 12 MMOL/L (ref 4–13)
BUN SERPL-MCNC: 12 MG/DL (ref 5–25)
CALCIUM SERPL-MCNC: 8 MG/DL (ref 8.3–10.1)
CHLORIDE SERPL-SCNC: 105 MMOL/L (ref 100–108)
CO2 SERPL-SCNC: 20 MMOL/L (ref 21–32)
CREAT SERPL-MCNC: 0.89 MG/DL (ref 0.6–1.3)
GFR SERPL CREATININE-BSD FRML MDRD: 84 ML/MIN/1.73SQ M
GLUCOSE SERPL-MCNC: 78 MG/DL (ref 65–140)
POTASSIUM SERPL-SCNC: 2.8 MMOL/L (ref 3.5–5.3)
SODIUM SERPL-SCNC: 137 MMOL/L (ref 136–145)

## 2019-03-06 PROCEDURE — 96365 THER/PROPH/DIAG IV INF INIT: CPT

## 2019-03-06 PROCEDURE — 96366 THER/PROPH/DIAG IV INF ADDON: CPT

## 2019-03-06 PROCEDURE — 80048 BASIC METABOLIC PNL TOTAL CA: CPT | Performed by: INTERNAL MEDICINE

## 2019-03-06 RX ADMIN — POTASSIUM CHLORIDE 62.5 ML/HR: 2 INJECTION, SOLUTION, CONCENTRATE INTRAVENOUS at 13:06

## 2019-03-06 RX ADMIN — SODIUM CHLORIDE 20 ML/HR: 0.9 INJECTION, SOLUTION INTRAVENOUS at 12:55

## 2019-03-07 RX ORDER — SODIUM CHLORIDE 9 MG/ML
20 INJECTION, SOLUTION INTRAVENOUS CONTINUOUS
Status: DISCONTINUED | OUTPATIENT
Start: 2019-03-08 | End: 2019-03-08

## 2019-03-08 ENCOUNTER — HOSPITAL ENCOUNTER (EMERGENCY)
Facility: HOSPITAL | Age: 34
Discharge: HOME | End: 2019-03-08
Attending: EMERGENCY MEDICINE
Payer: COMMERCIAL

## 2019-03-08 ENCOUNTER — HOSPITAL ENCOUNTER (OUTPATIENT)
Dept: INFUSION CENTER | Facility: HOSPITAL | Age: 34
Discharge: HOME | End: 2019-03-08
Payer: COMMERCIAL

## 2019-03-08 VITALS
HEIGHT: 63 IN | DIASTOLIC BLOOD PRESSURE: 55 MMHG | SYSTOLIC BLOOD PRESSURE: 102 MMHG | TEMPERATURE: 98.4 F | HEART RATE: 97 BPM | OXYGEN SATURATION: 100 % | WEIGHT: 153.66 LBS | RESPIRATION RATE: 20 BRPM | BODY MASS INDEX: 27.23 KG/M2

## 2019-03-08 VITALS
DIASTOLIC BLOOD PRESSURE: 57 MMHG | HEART RATE: 82 BPM | TEMPERATURE: 97.3 F | RESPIRATION RATE: 18 BRPM | SYSTOLIC BLOOD PRESSURE: 121 MMHG

## 2019-03-08 DIAGNOSIS — E87.6 HYPOKALEMIA: Primary | ICD-10-CM

## 2019-03-08 LAB
ANION GAP SERPL CALCULATED.3IONS-SCNC: 13 MMOL/L (ref 4–13)
ANION GAP SERPL CALCULATED.3IONS-SCNC: 15 MMOL/L (ref 4–13)
BASOPHILS # BLD AUTO: 0.05 THOUSANDS/ΜL (ref 0–0.1)
BASOPHILS NFR BLD AUTO: 1 % (ref 0–1)
BILIRUB UR QL STRIP: NEGATIVE
BUN SERPL-MCNC: 11 MG/DL (ref 5–25)
BUN SERPL-MCNC: 13 MG/DL (ref 5–25)
CALCIUM SERPL-MCNC: 7.9 MG/DL (ref 8.3–10.1)
CALCIUM SERPL-MCNC: 8 MG/DL (ref 8.3–10.1)
CHLORIDE SERPL-SCNC: 107 MMOL/L (ref 100–108)
CHLORIDE SERPL-SCNC: 109 MMOL/L (ref 100–108)
CLARITY UR: CLEAR
CO2 SERPL-SCNC: 18 MMOL/L (ref 21–32)
CO2 SERPL-SCNC: 19 MMOL/L (ref 21–32)
COLOR UR: YELLOW
CREAT SERPL-MCNC: 0.9 MG/DL (ref 0.6–1.3)
CREAT SERPL-MCNC: 0.93 MG/DL (ref 0.6–1.3)
EOSINOPHIL # BLD AUTO: 0.12 THOUSAND/ΜL (ref 0–0.61)
EOSINOPHIL NFR BLD AUTO: 2 % (ref 0–6)
ERYTHROCYTE [DISTWIDTH] IN BLOOD BY AUTOMATED COUNT: 13.4 % (ref 11.6–15.1)
EXT PREG TEST URINE: NORMAL
GFR SERPL CREATININE-BSD FRML MDRD: 80 ML/MIN/1.73SQ M
GFR SERPL CREATININE-BSD FRML MDRD: 83 ML/MIN/1.73SQ M
GLUCOSE SERPL-MCNC: 78 MG/DL (ref 65–140)
GLUCOSE SERPL-MCNC: 83 MG/DL (ref 65–140)
GLUCOSE UR STRIP-MCNC: NEGATIVE MG/DL
HCT VFR BLD AUTO: 34.9 % (ref 34.8–46.1)
HGB BLD-MCNC: 11.1 G/DL (ref 11.5–15.4)
HGB UR QL STRIP.AUTO: NEGATIVE
IMM GRANULOCYTES # BLD AUTO: 0.01 THOUSAND/UL (ref 0–0.2)
IMM GRANULOCYTES NFR BLD AUTO: 0 % (ref 0–2)
KETONES UR STRIP-MCNC: ABNORMAL MG/DL
LEUKOCYTE ESTERASE UR QL STRIP: NEGATIVE
LYMPHOCYTES # BLD AUTO: 2.12 THOUSANDS/ΜL (ref 0.6–4.47)
LYMPHOCYTES NFR BLD AUTO: 43 % (ref 14–44)
MAGNESIUM SERPL-MCNC: 2 MG/DL (ref 1.6–2.6)
MCH RBC QN AUTO: 30.1 PG (ref 26.8–34.3)
MCHC RBC AUTO-ENTMCNC: 31.8 G/DL (ref 31.4–37.4)
MCV RBC AUTO: 95 FL (ref 82–98)
MONOCYTES # BLD AUTO: 0.39 THOUSAND/ΜL (ref 0.17–1.22)
MONOCYTES NFR BLD AUTO: 8 % (ref 4–12)
NEUTROPHILS # BLD AUTO: 2.28 THOUSANDS/ΜL (ref 1.85–7.62)
NEUTS SEG NFR BLD AUTO: 46 % (ref 43–75)
NITRITE UR QL STRIP: NEGATIVE
NRBC BLD AUTO-RTO: 0 /100 WBCS
PH UR STRIP.AUTO: 6 [PH]
PLATELET # BLD AUTO: 254 THOUSANDS/UL (ref 149–390)
PMV BLD AUTO: 10.3 FL (ref 8.9–12.7)
POTASSIUM SERPL-SCNC: 2.6 MMOL/L (ref 3.5–5.3)
POTASSIUM SERPL-SCNC: 2.7 MMOL/L (ref 3.5–5.3)
PROT UR STRIP-MCNC: NEGATIVE MG/DL
RBC # BLD AUTO: 3.69 MILLION/UL (ref 3.81–5.12)
SODIUM SERPL-SCNC: 139 MMOL/L (ref 136–145)
SODIUM SERPL-SCNC: 142 MMOL/L (ref 136–145)
SP GR UR STRIP.AUTO: >=1.03 (ref 1–1.03)
UROBILINOGEN UR QL STRIP.AUTO: 0.2 E.U./DL
WBC # BLD AUTO: 4.97 THOUSAND/UL (ref 4.31–10.16)

## 2019-03-08 PROCEDURE — 83735 ASSAY OF MAGNESIUM: CPT | Performed by: EMERGENCY MEDICINE

## 2019-03-08 PROCEDURE — 96365 THER/PROPH/DIAG IV INF INIT: CPT

## 2019-03-08 PROCEDURE — 80048 BASIC METABOLIC PNL TOTAL CA: CPT | Performed by: EMERGENCY MEDICINE

## 2019-03-08 PROCEDURE — 85025 COMPLETE CBC W/AUTO DIFF WBC: CPT | Performed by: EMERGENCY MEDICINE

## 2019-03-08 PROCEDURE — 99283 EMERGENCY DEPT VISIT LOW MDM: CPT

## 2019-03-08 PROCEDURE — 80048 BASIC METABOLIC PNL TOTAL CA: CPT | Performed by: INTERNAL MEDICINE

## 2019-03-08 PROCEDURE — 81003 URINALYSIS AUTO W/O SCOPE: CPT | Performed by: EMERGENCY MEDICINE

## 2019-03-08 PROCEDURE — 96366 THER/PROPH/DIAG IV INF ADDON: CPT

## 2019-03-08 PROCEDURE — 81025 URINE PREGNANCY TEST: CPT | Performed by: EMERGENCY MEDICINE

## 2019-03-08 RX ORDER — POTASSIUM CHLORIDE 20MEQ/15ML
40 LIQUID (ML) ORAL
Qty: 900 ML | Refills: 0
Start: 2019-03-08 | End: 2019-04-19 | Stop reason: HOSPADM

## 2019-03-08 RX ORDER — POTASSIUM CHLORIDE 14.9 MG/ML
40 INJECTION INTRAVENOUS ONCE
Status: COMPLETED | OUTPATIENT
Start: 2019-03-08 | End: 2019-03-08

## 2019-03-08 RX ADMIN — SODIUM CHLORIDE 20 ML/HR: 0.9 INJECTION, SOLUTION INTRAVENOUS at 09:40

## 2019-03-08 RX ADMIN — SODIUM CHLORIDE 1000 ML: 0.9 INJECTION, SOLUTION INTRAVENOUS at 10:56

## 2019-03-08 RX ADMIN — POTASSIUM CHLORIDE 40 MEQ: 200 INJECTION, SOLUTION INTRAVENOUS at 11:22

## 2019-03-08 NOTE — ED PROVIDER NOTES
History  Chief Complaint   Patient presents with    Abnormal Lab     Patient came in for her second Potassium infusion for the week and her K+ was 2 6 "was told she did not look good" and to go to ER for admission  Wednesday she was 2 8 and has been trending downward more than usual this week  Patient feels tired, has tingling throughout body  Patient with chronic hypokalemia  She has been as low as 2 1 in the past   The patient has been receiving IV potassium infusions weekly on Tuesday and Friday  Her range from February 26 to today is been able from 2 8-2 6  Patient was sent in for evaluation of her hypokalemia  Patient has been seen by multiple specialists and is currently being worked up for this as an outpatient  Patient reports tingling in her bilateral hands and feet which is present for several weeks and is unchanged  She denies chest pain, shortness of breath, cough, UR symptoms nausea vomiting or diarrhea, urinary complaints      History provided by:  Patient      Prior to Admission Medications   Prescriptions Last Dose Informant Patient Reported? Taking?    AMILoride 5 mg tablet   No Yes   Sig: Take 1 tablet (5 mg total) by mouth daily   amitriptyline (ELAVIL) 50 mg tablet   Yes Yes   Sig: Take 50 mg by mouth daily at bedtime   b complex-C-folic acid (NEPHRO-EDIE) 0 8 mg tablet   Yes Yes   Sig: Take 0 8 mg by mouth daily with dinner   ergocalciferol (ERGOCALCIFEROL) 94797 units capsule   Yes Yes   Sig: Take 50,000 Units by mouth once a week Patient takes this med on Mondays    ferrous sulfate 325 (65 Fe) mg tablet   Yes Yes   Sig: Take 325 mg by mouth 2 (two) times a day with meals   magnesium oxide (MAG-OX) 400 mg   No Yes   Sig: Take 1 tablet (400 mg total) by mouth daily   meclizine (ANTIVERT) 25 mg tablet   Yes Yes   Sig: Take 25 mg by mouth daily at bedtime   potassium chloride 10 %   No Yes   Sig: Take 30 mL (40 mEq total) by mouth 5 (five) times a day   potassium chloride 10 %   No No Sig: Take 30 mL (40 mEq total) by mouth 5 (five) times a day   sodium bicarbonate 650 mg tablet   No Yes   Sig: Take 1 tablet (650 mg total) by mouth 2 (two) times a day   thiamine 100 MG tablet   Yes Yes   Sig: Take 100 mg by mouth daily   zinc gluconate 50 mg tablet   Yes Yes   Sig: Take 50 mg by mouth daily      Facility-Administered Medications: None       Past Medical History:   Diagnosis Date    H  pylori infection     Hypokalemia     Migraine        Past Surgical History:   Procedure Laterality Date    ABDOMINAL SURGERY      APPENDECTOMY      CHOLECYSTECTOMY      FL GUIDED CENTRAL VENOUS ACCESS REPLACEMENT  12/21/2018    GASTRIC BYPASS      HYSTERECTOMY      TUNNELED VENOUS PORT PLACEMENT N/A 12/21/2018    Procedure: INSERTION VENOUS PORT (PORT-A-CATH); Surgeon: Sctot Herndon DO;  Location: [facility];  Service: General       Family History   Problem Relation Age of Onset    Hypertension Father     Diabetes Father     Diabetes Maternal Grandfather      I have reviewed and agree with the history as documented  Social History     Tobacco Use    Smoking status: Never Smoker    Smokeless tobacco: Never Used   Substance Use Topics    Alcohol use: Never     Alcohol/week: 0 0 oz     Frequency: Never     Drinks per session: Patient refused     Binge frequency: Never    Drug use: No        Review of Systems   Constitutional: Positive for fatigue  Negative for activity change, appetite change and fever  HENT: Negative for congestion, dental problem, ear pain, rhinorrhea and sore throat  Eyes: Negative for pain and redness  Respiratory: Negative for chest tightness, shortness of breath and wheezing  Cardiovascular: Negative for chest pain and palpitations  Gastrointestinal: Negative for abdominal pain, blood in stool, constipation, diarrhea, nausea and vomiting  Endocrine: Negative for cold intolerance and heat intolerance     Genitourinary: Negative for dysuria, frequency and hematuria  Musculoskeletal: Negative for arthralgias and myalgias  Skin: Negative for color change, pallor and rash  Neurological: Negative for dizziness, facial asymmetry, weakness, numbness and headaches  Hematological: Does not bruise/bleed easily  Psychiatric/Behavioral: Negative for agitation, hallucinations and suicidal ideas  Physical Exam  Physical Exam   Constitutional: She is oriented to person, place, and time  She appears well-developed and well-nourished  HENT:   Mouth/Throat: No oropharyngeal exudate  TMs normal bilaterally no pharyngeal erythema no rhinorrhea nontender palpation of sinuses, normal looking turbinates   Eyes: Conjunctivae and EOM are normal    Neck: Normal range of motion  Neck supple  No meningeal signs   Cardiovascular: Normal rate, regular rhythm, normal heart sounds and intact distal pulses  Pulmonary/Chest: Effort normal and breath sounds normal  No respiratory distress  She has no wheezes  She has no rales  She exhibits no tenderness  Abdominal: Soft  Bowel sounds are normal  She exhibits no distension and no mass  There is no tenderness  No hernia  No cvat   Musculoskeletal: Normal range of motion  She exhibits no edema  Lymphadenopathy:     She has no cervical adenopathy  Neurological: She is alert and oriented to person, place, and time  No cranial nerve deficit  Skin: No rash noted  No erythema  No edema   Psychiatric: She has a normal mood and affect  Her behavior is normal    Nursing note and vitals reviewed        Vital Signs  ED Triage Vitals [03/08/19 1040]   Temperature Pulse Respirations Blood Pressure SpO2   98 4 °F (36 9 °C) 87 18 131/71 96 %      Temp Source Heart Rate Source Patient Position - Orthostatic VS BP Location FiO2 (%)   Temporal Monitor Sitting Left arm --      Pain Score       No Pain           Vitals:    03/08/19 1040 03/08/19 1229 03/08/19 1230   BP: 131/71 123/62 136/61   Pulse: 87 81 79   Patient Position - Orthostatic VS: Sitting Sitting Sitting       Visual Acuity  Visual Acuity      Most Recent Value   L Pupil Size (mm)  3   R Pupil Size (mm)  3          ED Medications  Medications   sodium chloride 0 9 % bolus 1,000 mL (0 mL Intravenous Stopped 3/8/19 1228)   potassium chloride 20 mEq IVPB (premix) (40 mEq Intravenous New Bag 3/8/19 1122)       Diagnostic Studies  Results Reviewed     Procedure Component Value Units Date/Time    UA w Reflex to Microscopic [[id number]]  (Abnormal) Collected:  03/08/19 1223    Lab Status:  Final result Specimen:  Urine, Clean Catch Updated:  03/08/19 1229     Color, UA Yellow     Clarity, UA Clear     Specific Gravity, UA >=1 030     pH, UA 6 0     Leukocytes, UA Negative     Nitrite, UA Negative     Protein, UA Negative mg/dl      Glucose, UA Negative mg/dl      Ketones, UA Trace mg/dl      Urobilinogen, UA 0 2 E U /dl      Bilirubin, UA Negative     Blood, UA Negative    Basic metabolic panel [[id number]]  (Abnormal) Collected:  03/08/19 1054    Lab Status:  Final result Specimen:  Blood from Central Venous Line Updated:  03/08/19 1118     Sodium 139 mmol/L      Potassium 2 7 mmol/L      Chloride 107 mmol/L      CO2 19 mmol/L      ANION GAP 13 mmol/L      BUN 11 mg/dL      Creatinine 0 93 mg/dL      Glucose 78 mg/dL      Calcium 8 0 mg/dL      eGFR 80 ml/min/1 73sq m     Narrative:       National Kidney Disease Education Program recommendations are as follows:  GFR calculation is accurate only with a steady state creatinine  Chronic Kidney disease less than 60 ml/min/1 73 sq  meters  Kidney failure less than 15 ml/min/1 73 sq  meters      Magnesium [[id number]]  (Normal) Collected:  03/08/19 1054    Lab Status:  Final result Specimen:  Blood from Central Venous Line Updated:  03/08/19 1116     Magnesium 2 0 mg/dL     CBC and differential [[id number]]  (Abnormal) Collected:  03/08/19 1054    Lab Status:  Final result Specimen:  Blood from Central Venous Line Updated:  03/08/19 1105 WBC 4 97 Thousand/uL      RBC 3 69 Million/uL      Hemoglobin 11 1 g/dL      Hematocrit 34 9 %      MCV 95 fL      MCH 30 1 pg      MCHC 31 8 g/dL      RDW 13 4 %      MPV 10 3 fL      Platelets 852 Thousands/uL      nRBC 0 /100 WBCs      Neutrophils Relative 46 %      Immat GRANS % 0 %      Lymphocytes Relative 43 %      Monocytes Relative 8 %      Eosinophils Relative 2 %      Basophils Relative 1 %      Neutrophils Absolute 2 28 Thousands/µL      Immature Grans Absolute 0 01 Thousand/uL      Lymphocytes Absolute 2 12 Thousands/µL      Monocytes Absolute 0 39 Thousand/µL      Eosinophils Absolute 0 12 Thousand/µL      Basophils Absolute 0 05 Thousands/µL     POCT pregnancy, urine [[id number]]  (Normal) Resulted:  03/08/19 1048    Lab Status:  Final result Updated:  03/08/19 1048     EXT PREG TEST UR (Ref: Negative) hysterectomy- negative                 No orders to display              Procedures  Procedures       Phone Contacts  ED Phone Contact    ED Course                               MDM  Number of Diagnoses or Management Options  Diagnosis management comments: Chronic hypokalemia which is around patient's baseline-will give IV potassium, reassure, counseled, repeat K+ in 48 hours      Disposition  Final diagnoses:   Hypokalemia     Time reflects when diagnosis was documented in both MDM as applicable and the Disposition within this note     Time User Action Codes Description Comment    3/8/2019 12:27 PM Sulema Barbosa Add [E87 6] Hypokalemia       ED Disposition     ED Disposition Condition Date/Time Comment    Discharge Stable Fri Mar 8, 2019 12:27 PM Denita Oconnor discharge to home/self care              Follow-up Information     Follow up With Specialties Details Why [address]abb IV, MD Family Medicine Schedule an appointment as soon as possible for a visit in 2 days  [place] [address] [address]  [phone]            Patient's Medications   Discharge Prescriptions No medications on file     Outpatient Discharge Orders   Basic metabolic panel   Standing Status: Future Standing Exp   Date: 03/08/20       ED Provider  Electronically Signed by           Colten Fitzpatrick MD  03/08/19 5664

## 2019-03-08 NOTE — PROGRESS NOTES
Patient here for potassium infusion  Stating overall not feeling well  Labs to be drawn  BMP collected and sent  VS taken WNL  Received call from lab, potassium 2 6 and patient stating she would like to go to the ER for further treatment  TC to PCP to advise of same  Dr Isidra Hightower on vacation Dr Derrick Jackson to cover  TC to report findings and to inform of ER transfer  Report given to [facility] ER charge nurse  Transferred via [facility] with Port flushed and capped  Staff awaiting patient upon arrival    AVS not provided due to transfer  Patient is aware of next upcoming appt  Multiple attempts to Dr Antonio Noe office with busy signal     Finally able to get thru office and report given to nursing staff including potassium results  MD to call ER directly

## 2019-03-08 NOTE — PLAN OF CARE
Problem: INFECTION - ADULT  Goal: Absence or prevention of progression during hospitalization  Description  INTERVENTIONS:  - Assess and monitor for signs and symptoms of infection  - Monitor lab/diagnostic results  - Monitor all insertion sites, i e  indwelling lines, tubes, and drains  - Monitor endotracheal (as able) and nasal secretions for changes in amount and color  - [place] appropriate cooling/warming therapies per order  - Administer medications as ordered  - Instruct and encourage patient and family to use good hand hygiene technique  - Identify and instruct in appropriate isolation precautions for identified infection/condition  Outcome: Progressing  Goal: Absence of fever/infection during neutropenic period  Description  INTERVENTIONS:  - Monitor WBC  - Implement neutropenic guidelines  Outcome: Progressing

## 2019-03-11 RX ORDER — SODIUM CHLORIDE 9 MG/ML
20 INJECTION, SOLUTION INTRAVENOUS CONTINUOUS
Status: DISPENSED | OUTPATIENT
Start: 2019-03-12 | End: 2019-03-12

## 2019-03-12 ENCOUNTER — HOSPITAL ENCOUNTER (OUTPATIENT)
Dept: INFUSION CENTER | Facility: HOSPITAL | Age: 34
Discharge: HOME | End: 2019-03-12
Payer: COMMERCIAL

## 2019-03-12 VITALS
OXYGEN SATURATION: 100 % | HEART RATE: 81 BPM | RESPIRATION RATE: 18 BRPM | TEMPERATURE: 97.8 F | DIASTOLIC BLOOD PRESSURE: 50 MMHG | SYSTOLIC BLOOD PRESSURE: 106 MMHG

## 2019-03-12 LAB
ANION GAP SERPL CALCULATED.3IONS-SCNC: 14 MMOL/L (ref 4–13)
BUN SERPL-MCNC: 9 MG/DL (ref 5–25)
CALCIUM SERPL-MCNC: 8 MG/DL (ref 8.3–10.1)
CHLORIDE SERPL-SCNC: 111 MMOL/L (ref 100–108)
CO2 SERPL-SCNC: 17 MMOL/L (ref 21–32)
CREAT SERPL-MCNC: 0.84 MG/DL (ref 0.6–1.3)
GFR SERPL CREATININE-BSD FRML MDRD: 90 ML/MIN/1.73SQ M
GLUCOSE SERPL-MCNC: 81 MG/DL (ref 65–140)
POTASSIUM SERPL-SCNC: 2.8 MMOL/L (ref 3.5–5.3)
SODIUM SERPL-SCNC: 142 MMOL/L (ref 136–145)

## 2019-03-12 PROCEDURE — 80048 BASIC METABOLIC PNL TOTAL CA: CPT | Performed by: INTERNAL MEDICINE

## 2019-03-12 PROCEDURE — 96366 THER/PROPH/DIAG IV INF ADDON: CPT

## 2019-03-12 PROCEDURE — 96365 THER/PROPH/DIAG IV INF INIT: CPT

## 2019-03-12 RX ADMIN — POTASSIUM CHLORIDE 62.5 ML/HR: 2 INJECTION, SOLUTION, CONCENTRATE INTRAVENOUS at 09:16

## 2019-03-12 RX ADMIN — SODIUM CHLORIDE 20 ML/HR: 0.9 INJECTION, SOLUTION INTRAVENOUS at 08:30

## 2019-03-12 NOTE — PROGRESS NOTES
0650 Patient states she still has a numb tongue and lips, tingling ears and eyelids, left arm and leg are numb, and chest pain that feels like someone sitting oh her  States Dr Rosie Lobo is aware of all symptoms as she was seen in ER for same complaints last week  States she is ok to stay for infusion today

## 2019-03-12 NOTE — PLAN OF CARE
Problem: Potential for Falls  Goal: Patient will remain free of falls  Description  INTERVENTIONS:  - Assess patient frequently for physical needs  -  Identify cognitive and physical deficits and behaviors that affect risk of falls    -  [place] fall precautions as indicated by assessment   - Educate patient/family on patient safety including physical limitations  - Instruct patient to call for assistance with activity based on assessment  - Modify environment to reduce risk of injury  - Consider OT/PT consult to assist with strengthening/mobility  Outcome: Progressing     Problem: INFECTION - ADULT  Goal: Absence or prevention of progression during hospitalization  Description  INTERVENTIONS:  - Assess and monitor for signs and symptoms of infection  - Monitor lab/diagnostic results  - Monitor all insertion sites, i e  indwelling lines, tubes, and drains  - Monitor endotracheal (as able) and nasal secretions for changes in amount and color  - [place] appropriate cooling/warming therapies per order  - Administer medications as ordered  - Instruct and encourage patient and family to use good hand hygiene technique  - Identify and instruct in appropriate isolation precautions for identified infection/condition  Outcome: Progressing

## 2019-03-12 NOTE — PROGRESS NOTES
[facility], ELYSSA reported potassium level 2 8 to Dr Shaw Tobin office  No new orders noted   Office aware patient next appointment is 03/15/19

## 2019-03-13 ENCOUNTER — HOSPITAL ENCOUNTER (OUTPATIENT)
Dept: INFUSION CENTER | Facility: HOSPITAL | Age: 34
Discharge: HOME | End: 2019-03-13

## 2019-03-14 RX ORDER — SODIUM CHLORIDE 9 MG/ML
20 INJECTION, SOLUTION INTRAVENOUS CONTINUOUS
Status: DISPENSED | OUTPATIENT
Start: 2019-03-15 | End: 2019-03-15

## 2019-03-14 RX ORDER — POTASSIUM CHLORIDE 14.9 MG/ML
20 INJECTION INTRAVENOUS
Status: COMPLETED | OUTPATIENT
Start: 2019-03-15 | End: 2019-03-15

## 2019-03-15 ENCOUNTER — HOSPITAL ENCOUNTER (OUTPATIENT)
Dept: INFUSION CENTER | Facility: HOSPITAL | Age: 34
Discharge: HOME | End: 2019-03-15
Payer: COMMERCIAL

## 2019-03-15 VITALS
HEART RATE: 98 BPM | RESPIRATION RATE: 16 BRPM | TEMPERATURE: 98 F | SYSTOLIC BLOOD PRESSURE: 106 MMHG | OXYGEN SATURATION: 100 % | DIASTOLIC BLOOD PRESSURE: 76 MMHG

## 2019-03-15 LAB
ANION GAP SERPL CALCULATED.3IONS-SCNC: 9 MMOL/L (ref 4–13)
BUN SERPL-MCNC: 11 MG/DL (ref 5–25)
CALCIUM SERPL-MCNC: 8 MG/DL (ref 8.3–10.1)
CHLORIDE SERPL-SCNC: 109 MMOL/L (ref 100–108)
CO2 SERPL-SCNC: 22 MMOL/L (ref 21–32)
CREAT SERPL-MCNC: 0.83 MG/DL (ref 0.6–1.3)
FERRITIN SERPL-MCNC: 6 NG/ML (ref 8–388)
GFR SERPL CREATININE-BSD FRML MDRD: 92 ML/MIN/1.73SQ M
GLUCOSE SERPL-MCNC: 81 MG/DL (ref 65–140)
IRON SATN MFR SERPL: 8 %
IRON SERPL-MCNC: 24 UG/DL (ref 50–170)
POTASSIUM SERPL-SCNC: 3.4 MMOL/L (ref 3.5–5.3)
SODIUM SERPL-SCNC: 140 MMOL/L (ref 136–145)
TIBC SERPL-MCNC: 290 UG/DL (ref 250–450)

## 2019-03-15 PROCEDURE — 82728 ASSAY OF FERRITIN: CPT | Performed by: INTERNAL MEDICINE

## 2019-03-15 PROCEDURE — 96365 THER/PROPH/DIAG IV INF INIT: CPT

## 2019-03-15 PROCEDURE — 96366 THER/PROPH/DIAG IV INF ADDON: CPT

## 2019-03-15 PROCEDURE — 83540 ASSAY OF IRON: CPT | Performed by: INTERNAL MEDICINE

## 2019-03-15 PROCEDURE — 80048 BASIC METABOLIC PNL TOTAL CA: CPT | Performed by: INTERNAL MEDICINE

## 2019-03-15 PROCEDURE — 83550 IRON BINDING TEST: CPT | Performed by: INTERNAL MEDICINE

## 2019-03-15 RX ADMIN — POTASSIUM CHLORIDE 20 MEQ: 200 INJECTION, SOLUTION INTRAVENOUS at 08:37

## 2019-03-15 RX ADMIN — SODIUM CHLORIDE 20 ML/HR: 0.9 INJECTION, SOLUTION INTRAVENOUS at 08:00

## 2019-03-15 RX ADMIN — POTASSIUM CHLORIDE 20 MEQ: 200 INJECTION, SOLUTION INTRAVENOUS at 11:10

## 2019-03-15 NOTE — PROGRESS NOTES
Patient tolerated infusion well  Labs drawn as per order  AVS provided  Left unit in stable condition

## 2019-03-16 RX ORDER — POTASSIUM CHLORIDE 14.9 MG/ML
20 INJECTION INTRAVENOUS
Status: COMPLETED | OUTPATIENT
Start: 2019-03-19 | End: 2019-03-19

## 2019-03-16 RX ORDER — SODIUM CHLORIDE 9 MG/ML
20 INJECTION, SOLUTION INTRAVENOUS CONTINUOUS
Status: DISPENSED | OUTPATIENT
Start: 2019-03-19 | End: 2019-03-19

## 2019-03-19 ENCOUNTER — HOSPITAL ENCOUNTER (OUTPATIENT)
Dept: INFUSION CENTER | Facility: HOSPITAL | Age: 34
Discharge: HOME | End: 2019-03-19
Payer: COMMERCIAL

## 2019-03-19 VITALS
HEART RATE: 88 BPM | SYSTOLIC BLOOD PRESSURE: 113 MMHG | OXYGEN SATURATION: 100 % | TEMPERATURE: 98.1 F | DIASTOLIC BLOOD PRESSURE: 53 MMHG | RESPIRATION RATE: 16 BRPM

## 2019-03-19 DIAGNOSIS — E87.6 HYPOPOTASSEMIA: ICD-10-CM

## 2019-03-19 LAB
ANION GAP SERPL CALCULATED.3IONS-SCNC: 9 MMOL/L (ref 4–13)
BUN SERPL-MCNC: 10 MG/DL (ref 5–25)
CALCIUM SERPL-MCNC: 8.1 MG/DL (ref 8.3–10.1)
CHLORIDE SERPL-SCNC: 109 MMOL/L (ref 100–108)
CO2 SERPL-SCNC: 23 MMOL/L (ref 21–32)
CREAT SERPL-MCNC: 0.85 MG/DL (ref 0.6–1.3)
GFR SERPL CREATININE-BSD FRML MDRD: 89 ML/MIN/1.73SQ M
GLUCOSE SERPL-MCNC: 79 MG/DL (ref 65–140)
POTASSIUM SERPL-SCNC: 3.5 MMOL/L (ref 3.5–5.3)
SODIUM SERPL-SCNC: 141 MMOL/L (ref 136–145)

## 2019-03-19 PROCEDURE — 96366 THER/PROPH/DIAG IV INF ADDON: CPT

## 2019-03-19 PROCEDURE — 80048 BASIC METABOLIC PNL TOTAL CA: CPT

## 2019-03-19 PROCEDURE — 96365 THER/PROPH/DIAG IV INF INIT: CPT

## 2019-03-19 RX ADMIN — POTASSIUM CHLORIDE 20 MEQ: 200 INJECTION, SOLUTION INTRAVENOUS at 09:12

## 2019-03-19 RX ADMIN — SODIUM CHLORIDE 20 ML/HR: 0.9 INJECTION, SOLUTION INTRAVENOUS at 08:20

## 2019-03-19 RX ADMIN — POTASSIUM CHLORIDE 20 MEQ: 200 INJECTION, SOLUTION INTRAVENOUS at 11:10

## 2019-03-19 NOTE — PROGRESS NOTES
Pt arrived on unit at [facility]  States she feels tired  Okay to proceed with treatment  Labs drawn from port and sent to lab  Potassium level is 3 5  Patient to get 40 mEq potassium today

## 2019-03-21 RX ORDER — POTASSIUM CHLORIDE 14.9 MG/ML
20 INJECTION INTRAVENOUS
Status: COMPLETED | OUTPATIENT
Start: 2019-03-22 | End: 2019-03-22

## 2019-03-21 RX ORDER — SODIUM CHLORIDE 9 MG/ML
20 INJECTION, SOLUTION INTRAVENOUS CONTINUOUS
Status: DISPENSED | OUTPATIENT
Start: 2019-03-22 | End: 2019-03-22

## 2019-03-22 ENCOUNTER — HOSPITAL ENCOUNTER (OUTPATIENT)
Dept: INFUSION CENTER | Facility: HOSPITAL | Age: 34
Discharge: HOME | End: 2019-03-22
Payer: COMMERCIAL

## 2019-03-22 VITALS
RESPIRATION RATE: 18 BRPM | DIASTOLIC BLOOD PRESSURE: 60 MMHG | SYSTOLIC BLOOD PRESSURE: 122 MMHG | TEMPERATURE: 97.7 F | HEART RATE: 84 BPM | OXYGEN SATURATION: 100 %

## 2019-03-22 LAB
ANION GAP SERPL CALCULATED.3IONS-SCNC: 11 MMOL/L (ref 4–13)
BUN SERPL-MCNC: 11 MG/DL (ref 5–25)
CALCIUM SERPL-MCNC: 8.6 MG/DL (ref 8.3–10.1)
CHLORIDE SERPL-SCNC: 105 MMOL/L (ref 100–108)
CO2 SERPL-SCNC: 24 MMOL/L (ref 21–32)
CREAT SERPL-MCNC: 0.88 MG/DL (ref 0.6–1.3)
GFR SERPL CREATININE-BSD FRML MDRD: 85 ML/MIN/1.73SQ M
GLUCOSE SERPL-MCNC: 75 MG/DL (ref 65–140)
POTASSIUM SERPL-SCNC: 3.4 MMOL/L (ref 3.5–5.3)
SODIUM SERPL-SCNC: 140 MMOL/L (ref 136–145)

## 2019-03-22 PROCEDURE — 96366 THER/PROPH/DIAG IV INF ADDON: CPT

## 2019-03-22 PROCEDURE — 80048 BASIC METABOLIC PNL TOTAL CA: CPT | Performed by: INTERNAL MEDICINE

## 2019-03-22 PROCEDURE — 96365 THER/PROPH/DIAG IV INF INIT: CPT

## 2019-03-22 RX ORDER — POTASSIUM CHLORIDE 14.9 MG/ML
20 INJECTION INTRAVENOUS
Status: DISPENSED | OUTPATIENT
Start: 2019-03-26 | End: 2019-03-26

## 2019-03-22 RX ORDER — SODIUM CHLORIDE 9 MG/ML
20 INJECTION, SOLUTION INTRAVENOUS CONTINUOUS
Status: DISPENSED | OUTPATIENT
Start: 2019-03-26 | End: 2019-03-26

## 2019-03-22 RX ADMIN — POTASSIUM CHLORIDE 20 MEQ: 200 INJECTION, SOLUTION INTRAVENOUS at 09:11

## 2019-03-22 RX ADMIN — POTASSIUM CHLORIDE 20 MEQ: 200 INJECTION, SOLUTION INTRAVENOUS at 11:19

## 2019-03-22 RX ADMIN — SODIUM CHLORIDE 20 ML/HR: 0.9 INJECTION, SOLUTION INTRAVENOUS at 08:28

## 2019-03-22 NOTE — PROGRESS NOTES
Pt arrived on unit at 0800  States she's been tired  Still having problems with her right arm and left leg  Okay to proceed  Labs drawn from port  NSS added as flush at [facility]  Potassium level 3 4  Pt to receive 40mEq KCl today

## 2019-03-26 ENCOUNTER — HOSPITAL ENCOUNTER (OUTPATIENT)
Dept: INFUSION CENTER | Facility: HOSPITAL | Age: 34
Discharge: HOME | End: 2019-03-26
Payer: COMMERCIAL

## 2019-03-26 VITALS
RESPIRATION RATE: 18 BRPM | DIASTOLIC BLOOD PRESSURE: 57 MMHG | SYSTOLIC BLOOD PRESSURE: 120 MMHG | HEART RATE: 92 BPM | TEMPERATURE: 97.9 F | OXYGEN SATURATION: 100 %

## 2019-03-26 LAB
ANION GAP SERPL CALCULATED.3IONS-SCNC: 10 MMOL/L (ref 4–13)
BUN SERPL-MCNC: 14 MG/DL (ref 5–25)
CALCIUM SERPL-MCNC: 8.1 MG/DL (ref 8.3–10.1)
CHLORIDE SERPL-SCNC: 104 MMOL/L (ref 100–108)
CO2 SERPL-SCNC: 24 MMOL/L (ref 21–32)
CREAT SERPL-MCNC: 0.85 MG/DL (ref 0.6–1.3)
GFR SERPL CREATININE-BSD FRML MDRD: 89 ML/MIN/1.73SQ M
GLUCOSE SERPL-MCNC: 80 MG/DL (ref 65–140)
POTASSIUM SERPL-SCNC: 3.8 MMOL/L (ref 3.5–5.3)
SODIUM SERPL-SCNC: 138 MMOL/L (ref 136–145)

## 2019-03-26 PROCEDURE — 96365 THER/PROPH/DIAG IV INF INIT: CPT

## 2019-03-26 PROCEDURE — 80048 BASIC METABOLIC PNL TOTAL CA: CPT | Performed by: INTERNAL MEDICINE

## 2019-03-26 RX ADMIN — SODIUM CHLORIDE 20 ML/HR: 0.9 INJECTION, SOLUTION INTRAVENOUS at 08:06

## 2019-03-26 RX ADMIN — FERUMOXYTOL 510 MG: 510 INJECTION INTRAVENOUS at 08:30

## 2019-03-26 NOTE — PLAN OF CARE
Problem: Potential for Falls  Goal: Patient will remain free of falls  Description  INTERVENTIONS:  - Assess patient frequently for physical needs  -  Identify cognitive and physical deficits and behaviors that affect risk of falls  -  [place] fall precautions as indicated by assessment   - Educate patient/family on patient safety including physical limitations  - Instruct patient to call for assistance with activity based on assessment  - Modify environment to reduce risk of injury  - Consider OT/PT consult to assist with strengthening/mobility  Outcome: Progressing     Problem: INFECTION - ADULT  Goal: Absence or prevention of progression during hospitalization  Description  INTERVENTIONS:  - Assess and monitor for signs and symptoms of infection  - Monitor lab/diagnostic results  - Monitor all insertion sites, i e  indwelling lines, tubes, and drains  - Monitor endotracheal (as able) and nasal secretions for changes in amount and color  - [place] appropriate cooling/warming therapies per order  - Administer medications as ordered  - Instruct and encourage patient and family to use good hand hygiene technique  - Identify and instruct in appropriate isolation precautions for identified infection/condition  Outcome: Progressing     Problem: Knowledge Deficit  Goal: Patient/family/caregiver demonstrates understanding of disease process, treatment plan, medications, and discharge instructions  Description  Complete learning assessment and assess knowledge base    Interventions:  - Provide teaching at level of understanding  - Provide teaching via preferred learning methods  Outcome: Progressing

## 2019-03-27 RX ORDER — POTASSIUM CHLORIDE 14.9 MG/ML
20 INJECTION INTRAVENOUS
Status: COMPLETED | OUTPATIENT
Start: 2019-03-29 | End: 2019-03-29

## 2019-03-27 RX ORDER — SODIUM CHLORIDE 9 MG/ML
20 INJECTION, SOLUTION INTRAVENOUS CONTINUOUS
Status: DISPENSED | OUTPATIENT
Start: 2019-03-29 | End: 2019-03-29

## 2019-03-29 ENCOUNTER — HOSPITAL ENCOUNTER (OUTPATIENT)
Dept: INFUSION CENTER | Facility: HOSPITAL | Age: 34
Discharge: HOME | End: 2019-03-29
Payer: COMMERCIAL

## 2019-03-29 VITALS
HEART RATE: 102 BPM | DIASTOLIC BLOOD PRESSURE: 59 MMHG | SYSTOLIC BLOOD PRESSURE: 118 MMHG | OXYGEN SATURATION: 100 % | RESPIRATION RATE: 18 BRPM | TEMPERATURE: 98.4 F

## 2019-03-29 LAB
ANION GAP SERPL CALCULATED.3IONS-SCNC: 13 MMOL/L (ref 4–13)
BUN SERPL-MCNC: 15 MG/DL (ref 5–25)
CALCIUM SERPL-MCNC: 9 MG/DL (ref 8.3–10.1)
CHLORIDE SERPL-SCNC: 107 MMOL/L (ref 100–108)
CO2 SERPL-SCNC: 20 MMOL/L (ref 21–32)
CREAT SERPL-MCNC: 1.02 MG/DL (ref 0.6–1.3)
GFR SERPL CREATININE-BSD FRML MDRD: 71 ML/MIN/1.73SQ M
GLUCOSE SERPL-MCNC: 86 MG/DL (ref 65–140)
POTASSIUM SERPL-SCNC: 3.5 MMOL/L (ref 3.5–5.3)
SODIUM SERPL-SCNC: 140 MMOL/L (ref 136–145)

## 2019-03-29 PROCEDURE — 96365 THER/PROPH/DIAG IV INF INIT: CPT

## 2019-03-29 PROCEDURE — 80048 BASIC METABOLIC PNL TOTAL CA: CPT | Performed by: INTERNAL MEDICINE

## 2019-03-29 PROCEDURE — 96366 THER/PROPH/DIAG IV INF ADDON: CPT

## 2019-03-29 RX ADMIN — POTASSIUM CHLORIDE 20 MEQ: 14.9 INJECTION, SOLUTION INTRAVENOUS at 10:57

## 2019-03-29 RX ADMIN — POTASSIUM CHLORIDE 20 MEQ: 14.9 INJECTION, SOLUTION INTRAVENOUS at 08:39

## 2019-03-29 RX ADMIN — SODIUM CHLORIDE 20 ML/HR: 0.9 INJECTION, SOLUTION INTRAVENOUS at 08:11

## 2019-03-30 RX ORDER — SODIUM CHLORIDE 9 MG/ML
20 INJECTION, SOLUTION INTRAVENOUS CONTINUOUS
Status: DISPENSED | OUTPATIENT
Start: 2019-04-02 | End: 2019-04-02

## 2019-04-02 ENCOUNTER — HOSPITAL ENCOUNTER (OUTPATIENT)
Dept: INFUSION CENTER | Facility: HOSPITAL | Age: 34
Discharge: HOME | End: 2019-04-02
Payer: COMMERCIAL

## 2019-04-02 VITALS
SYSTOLIC BLOOD PRESSURE: 112 MMHG | HEART RATE: 80 BPM | DIASTOLIC BLOOD PRESSURE: 53 MMHG | OXYGEN SATURATION: 100 % | TEMPERATURE: 98.4 F | RESPIRATION RATE: 16 BRPM

## 2019-04-02 LAB
ANION GAP SERPL CALCULATED.3IONS-SCNC: 15 MMOL/L (ref 4–13)
BUN SERPL-MCNC: 16 MG/DL (ref 5–25)
CALCIUM SERPL-MCNC: 8 MG/DL (ref 8.3–10.1)
CHLORIDE SERPL-SCNC: 104 MMOL/L (ref 100–108)
CO2 SERPL-SCNC: 21 MMOL/L (ref 21–32)
CREAT SERPL-MCNC: 0.96 MG/DL (ref 0.6–1.3)
GFR SERPL CREATININE-BSD FRML MDRD: 77 ML/MIN/1.73SQ M
GLUCOSE P FAST SERPL-MCNC: 85 MG/DL (ref 65–99)
GLUCOSE SERPL-MCNC: 85 MG/DL (ref 65–140)
POTASSIUM SERPL-SCNC: 2.4 MMOL/L (ref 3.5–5.3)
SODIUM SERPL-SCNC: 140 MMOL/L (ref 136–145)

## 2019-04-02 PROCEDURE — 96365 THER/PROPH/DIAG IV INF INIT: CPT

## 2019-04-02 PROCEDURE — 80048 BASIC METABOLIC PNL TOTAL CA: CPT | Performed by: INTERNAL MEDICINE

## 2019-04-02 PROCEDURE — 96366 THER/PROPH/DIAG IV INF ADDON: CPT

## 2019-04-02 RX ADMIN — POTASSIUM CHLORIDE: 2 INJECTION, SOLUTION, CONCENTRATE INTRAVENOUS at 08:59

## 2019-04-02 RX ADMIN — SODIUM CHLORIDE 20 ML/HR: 0.9 INJECTION, SOLUTION INTRAVENOUS at 08:10

## 2019-04-03 RX ORDER — SODIUM CHLORIDE 9 MG/ML
20 INJECTION, SOLUTION INTRAVENOUS CONTINUOUS
Status: DISPENSED | OUTPATIENT
Start: 2019-04-04 | End: 2019-04-04

## 2019-04-04 ENCOUNTER — HOSPITAL ENCOUNTER (OUTPATIENT)
Dept: INFUSION CENTER | Facility: HOSPITAL | Age: 34
Discharge: HOME | End: 2019-04-04
Payer: COMMERCIAL

## 2019-04-04 VITALS
TEMPERATURE: 99.2 F | SYSTOLIC BLOOD PRESSURE: 109 MMHG | OXYGEN SATURATION: 99 % | DIASTOLIC BLOOD PRESSURE: 55 MMHG | RESPIRATION RATE: 18 BRPM | HEART RATE: 88 BPM

## 2019-04-04 LAB
ANION GAP SERPL CALCULATED.3IONS-SCNC: 14 MMOL/L (ref 4–13)
BUN SERPL-MCNC: 12 MG/DL (ref 5–25)
CALCIUM SERPL-MCNC: 8.4 MG/DL (ref 8.3–10.1)
CHLORIDE SERPL-SCNC: 105 MMOL/L (ref 100–108)
CO2 SERPL-SCNC: 20 MMOL/L (ref 21–32)
CREAT SERPL-MCNC: 0.95 MG/DL (ref 0.6–1.3)
GFR SERPL CREATININE-BSD FRML MDRD: 78 ML/MIN/1.73SQ M
GLUCOSE SERPL-MCNC: 87 MG/DL (ref 65–140)
POTASSIUM SERPL-SCNC: 2.4 MMOL/L (ref 3.5–5.3)
SODIUM SERPL-SCNC: 139 MMOL/L (ref 136–145)

## 2019-04-04 PROCEDURE — 96365 THER/PROPH/DIAG IV INF INIT: CPT

## 2019-04-04 PROCEDURE — 80048 BASIC METABOLIC PNL TOTAL CA: CPT | Performed by: INTERNAL MEDICINE

## 2019-04-04 PROCEDURE — 96366 THER/PROPH/DIAG IV INF ADDON: CPT

## 2019-04-04 PROCEDURE — 96367 TX/PROPH/DG ADDL SEQ IV INF: CPT

## 2019-04-04 RX ADMIN — FERUMOXYTOL 510 MG: 510 INJECTION INTRAVENOUS at 08:24

## 2019-04-04 RX ADMIN — POTASSIUM CHLORIDE: 149 INJECTION, SOLUTION, CONCENTRATE INTRAVENOUS at 09:17

## 2019-04-04 RX ADMIN — SODIUM CHLORIDE 20 ML/HR: 0.9 INJECTION, SOLUTION INTRAVENOUS at 08:07

## 2019-04-04 NOTE — PROGRESS NOTES
Patient started with bloody nose, dizziness, lightheadedness  VSS  Ice pack applied  Dr Lawrence Wallace made aware of same  Per Dr Lawrence Wallace keep eye on patient and keep him updated on her condition  No need for transfer to ED at this time

## 2019-04-04 NOTE — PLAN OF CARE
Problem: INFECTION - ADULT  Goal: Absence or prevention of progression during hospitalization  Description  INTERVENTIONS:  - Assess and monitor for signs and symptoms of infection  - Monitor lab/diagnostic results  - Monitor all insertion sites  - Instruct and encourage patient and family to use good hand hygiene technique     Outcome: Progressing

## 2019-04-04 NOTE — PROGRESS NOTES
Patient tolerated infusion well  Low grade temp noted checked times 2 99 0 then 99 2  Instructed to monitor  Fluids and tylenol if needed  Report to MD if any change  Verbalized understanding of same  Offered no complaints  AVS provided  Left unit in stable condition

## 2019-04-09 ENCOUNTER — HOSPITAL ENCOUNTER (OUTPATIENT)
Dept: INFUSION CENTER | Facility: HOSPITAL | Age: 34
End: 2019-04-09

## 2019-04-16 ENCOUNTER — HOSPITAL ENCOUNTER (OUTPATIENT)
Dept: INFUSION CENTER | Facility: HOSPITAL | Age: 34
Discharge: HOME | DRG: 425 | End: 2019-04-16
Payer: COMMERCIAL

## 2019-04-16 ENCOUNTER — HOSPITAL ENCOUNTER (INPATIENT)
Facility: HOSPITAL | Age: 34
LOS: 3 days | Discharge: HOME | DRG: 425 | End: 2019-04-19
Attending: EMERGENCY MEDICINE | Admitting: INTERNAL MEDICINE
Payer: COMMERCIAL

## 2019-04-16 VITALS
HEART RATE: 89 BPM | OXYGEN SATURATION: 100 % | SYSTOLIC BLOOD PRESSURE: 118 MMHG | RESPIRATION RATE: 18 BRPM | DIASTOLIC BLOOD PRESSURE: 58 MMHG | TEMPERATURE: 97.7 F

## 2019-04-16 DIAGNOSIS — E87.6 ACUTE HYPOKALEMIA: Primary | ICD-10-CM

## 2019-04-16 DIAGNOSIS — R53.83 MALAISE AND FATIGUE: ICD-10-CM

## 2019-04-16 DIAGNOSIS — M79.10 MYALGIA: ICD-10-CM

## 2019-04-16 DIAGNOSIS — R53.81 MALAISE AND FATIGUE: ICD-10-CM

## 2019-04-16 DIAGNOSIS — E87.6 HYPOKALEMIA: ICD-10-CM

## 2019-04-16 LAB
ANION GAP SERPL CALCULATED.3IONS-SCNC: 11 MMOL/L (ref 4–13)
ANION GAP SERPL CALCULATED.3IONS-SCNC: 12 MMOL/L (ref 4–13)
ANION GAP SERPL CALCULATED.3IONS-SCNC: 14 MMOL/L (ref 4–13)
BASOPHILS # BLD AUTO: 0.06 THOUSANDS/ΜL (ref 0–0.1)
BASOPHILS NFR BLD AUTO: 1 % (ref 0–1)
BUN SERPL-MCNC: 18 MG/DL (ref 5–25)
BUN SERPL-MCNC: 18 MG/DL (ref 5–25)
BUN SERPL-MCNC: 19 MG/DL (ref 5–25)
CALCIUM SERPL-MCNC: 8 MG/DL (ref 8.3–10.1)
CALCIUM SERPL-MCNC: 8.4 MG/DL (ref 8.3–10.1)
CALCIUM SERPL-MCNC: 8.7 MG/DL (ref 8.3–10.1)
CHLORIDE SERPL-SCNC: 101 MMOL/L (ref 100–108)
CHLORIDE SERPL-SCNC: 104 MMOL/L (ref 100–108)
CHLORIDE SERPL-SCNC: 104 MMOL/L (ref 100–108)
CO2 SERPL-SCNC: 22 MMOL/L (ref 21–32)
CO2 SERPL-SCNC: 24 MMOL/L (ref 21–32)
CO2 SERPL-SCNC: 24 MMOL/L (ref 21–32)
CREAT SERPL-MCNC: 0.91 MG/DL (ref 0.6–1.3)
CREAT SERPL-MCNC: 0.96 MG/DL (ref 0.6–1.3)
CREAT SERPL-MCNC: 0.98 MG/DL (ref 0.6–1.3)
EOSINOPHIL # BLD AUTO: 0.26 THOUSAND/ΜL (ref 0–0.61)
EOSINOPHIL NFR BLD AUTO: 5 % (ref 0–6)
ERYTHROCYTE [DISTWIDTH] IN BLOOD BY AUTOMATED COUNT: 14.6 % (ref 11.6–15.1)
GFR SERPL CREATININE-BSD FRML MDRD: 75 ML/MIN/1.73SQ M
GFR SERPL CREATININE-BSD FRML MDRD: 77 ML/MIN/1.73SQ M
GFR SERPL CREATININE-BSD FRML MDRD: 82 ML/MIN/1.73SQ M
GLUCOSE SERPL-MCNC: 76 MG/DL (ref 65–140)
GLUCOSE SERPL-MCNC: 78 MG/DL (ref 65–140)
GLUCOSE SERPL-MCNC: 82 MG/DL (ref 65–140)
HCT VFR BLD AUTO: 35.9 % (ref 34.8–46.1)
HGB BLD-MCNC: 12 G/DL (ref 11.5–15.4)
IMM GRANULOCYTES # BLD AUTO: 0.01 THOUSAND/UL (ref 0–0.2)
IMM GRANULOCYTES NFR BLD AUTO: 0 % (ref 0–2)
LYMPHOCYTES # BLD AUTO: 1.86 THOUSANDS/ΜL (ref 0.6–4.47)
LYMPHOCYTES NFR BLD AUTO: 33 % (ref 14–44)
MAGNESIUM SERPL-MCNC: 2.3 MG/DL (ref 1.6–2.6)
MCH RBC QN AUTO: 30.2 PG (ref 26.8–34.3)
MCHC RBC AUTO-ENTMCNC: 33.4 G/DL (ref 31.4–37.4)
MCV RBC AUTO: 90 FL (ref 82–98)
MONOCYTES # BLD AUTO: 0.47 THOUSAND/ΜL (ref 0.17–1.22)
MONOCYTES NFR BLD AUTO: 8 % (ref 4–12)
NEUTROPHILS # BLD AUTO: 3.06 THOUSANDS/ΜL (ref 1.85–7.62)
NEUTS SEG NFR BLD AUTO: 53 % (ref 43–75)
NRBC BLD AUTO-RTO: 0 /100 WBCS
PLATELET # BLD AUTO: 275 THOUSANDS/UL (ref 149–390)
PMV BLD AUTO: 10.4 FL (ref 8.9–12.7)
POTASSIUM SERPL-SCNC: 2 MMOL/L (ref 3.5–5.3)
POTASSIUM SERPL-SCNC: 2.9 MMOL/L (ref 3.5–5.3)
POTASSIUM SERPL-SCNC: 3.3 MMOL/L (ref 3.5–5.3)
RBC # BLD AUTO: 3.97 MILLION/UL (ref 3.81–5.12)
SODIUM SERPL-SCNC: 137 MMOL/L (ref 136–145)
SODIUM SERPL-SCNC: 139 MMOL/L (ref 136–145)
SODIUM SERPL-SCNC: 140 MMOL/L (ref 136–145)
WBC # BLD AUTO: 5.72 THOUSAND/UL (ref 4.31–10.16)

## 2019-04-16 PROCEDURE — 80048 BASIC METABOLIC PNL TOTAL CA: CPT | Performed by: INTERNAL MEDICINE

## 2019-04-16 PROCEDURE — 36415 COLL VENOUS BLD VENIPUNCTURE: CPT | Performed by: EMERGENCY MEDICINE

## 2019-04-16 PROCEDURE — 99285 EMERGENCY DEPT VISIT HI MDM: CPT

## 2019-04-16 PROCEDURE — 93005 ELECTROCARDIOGRAM TRACING: CPT

## 2019-04-16 PROCEDURE — 85025 COMPLETE CBC W/AUTO DIFF WBC: CPT | Performed by: EMERGENCY MEDICINE

## 2019-04-16 PROCEDURE — 96374 THER/PROPH/DIAG INJ IV PUSH: CPT

## 2019-04-16 PROCEDURE — 83735 ASSAY OF MAGNESIUM: CPT | Performed by: EMERGENCY MEDICINE

## 2019-04-16 PROCEDURE — 99285 EMERGENCY DEPT VISIT HI MDM: CPT | Performed by: EMERGENCY MEDICINE

## 2019-04-16 PROCEDURE — 99223 1ST HOSP IP/OBS HIGH 75: CPT | Performed by: INTERNAL MEDICINE

## 2019-04-16 RX ORDER — FERROUS SULFATE 325(65) MG
325 TABLET ORAL 2 TIMES DAILY WITH MEALS
Status: DISCONTINUED | OUTPATIENT
Start: 2019-04-16 | End: 2019-04-19 | Stop reason: HOSPADM

## 2019-04-16 RX ORDER — POTASSIUM CHLORIDE 20MEQ/15ML
40 LIQUID (ML) ORAL 4 TIMES DAILY
Status: DISCONTINUED | OUTPATIENT
Start: 2019-04-16 | End: 2019-04-17

## 2019-04-16 RX ORDER — SODIUM CHLORIDE 9 MG/ML
20 INJECTION, SOLUTION INTRAVENOUS CONTINUOUS
Status: DISCONTINUED | OUTPATIENT
Start: 2019-04-16 | End: 2019-04-19 | Stop reason: HOSPADM

## 2019-04-16 RX ORDER — THIAMINE MONONITRATE (VIT B1) 100 MG
100 TABLET ORAL DAILY
Status: DISCONTINUED | OUTPATIENT
Start: 2019-04-16 | End: 2019-04-19 | Stop reason: HOSPADM

## 2019-04-16 RX ORDER — AMITRIPTYLINE HYDROCHLORIDE 50 MG/1
50 TABLET, FILM COATED ORAL
Status: DISCONTINUED | OUTPATIENT
Start: 2019-04-16 | End: 2019-04-19 | Stop reason: HOSPADM

## 2019-04-16 RX ORDER — MAGNESIUM HYDROXIDE/ALUMINUM HYDROXICE/SIMETHICONE 120; 1200; 1200 MG/30ML; MG/30ML; MG/30ML
30 SUSPENSION ORAL EVERY 6 HOURS PRN
Status: DISCONTINUED | OUTPATIENT
Start: 2019-04-16 | End: 2019-04-19 | Stop reason: HOSPADM

## 2019-04-16 RX ORDER — ERGOCALCIFEROL 1.25 MG/1
50000 CAPSULE ORAL WEEKLY
Status: DISCONTINUED | OUTPATIENT
Start: 2019-04-16 | End: 2019-04-16

## 2019-04-16 RX ORDER — POTASSIUM CHLORIDE 14.9 MG/ML
20 INJECTION INTRAVENOUS
Status: DISPENSED | OUTPATIENT
Start: 2019-04-16 | End: 2019-04-16

## 2019-04-16 RX ORDER — MECLIZINE HYDROCHLORIDE 25 MG/1
25 TABLET ORAL
Status: DISCONTINUED | OUTPATIENT
Start: 2019-04-16 | End: 2019-04-19 | Stop reason: HOSPADM

## 2019-04-16 RX ORDER — ZINC GLUCONATE 50 MG
50 TABLET ORAL DAILY
Status: DISCONTINUED | OUTPATIENT
Start: 2019-04-16 | End: 2019-04-19 | Stop reason: HOSPADM

## 2019-04-16 RX ORDER — ERGOCALCIFEROL 1.25 MG/1
50000 CAPSULE ORAL WEEKLY
Status: DISCONTINUED | OUTPATIENT
Start: 2019-04-22 | End: 2019-04-19 | Stop reason: HOSPADM

## 2019-04-16 RX ORDER — POTASSIUM CHLORIDE 14.9 MG/ML
20 INJECTION INTRAVENOUS
Status: DISCONTINUED | OUTPATIENT
Start: 2019-04-16 | End: 2019-04-16

## 2019-04-16 RX ORDER — SODIUM BICARBONATE 650 MG/1
650 TABLET ORAL 2 TIMES DAILY
Status: DISCONTINUED | OUTPATIENT
Start: 2019-04-16 | End: 2019-04-19 | Stop reason: HOSPADM

## 2019-04-16 RX ORDER — AMILORIDE HYDROCHLORIDE 5 MG/1
5 TABLET ORAL DAILY
Status: DISCONTINUED | OUTPATIENT
Start: 2019-04-17 | End: 2019-04-19 | Stop reason: HOSPADM

## 2019-04-16 RX ORDER — POTASSIUM CHLORIDE 14.9 MG/ML
20 INJECTION INTRAVENOUS
Status: ACTIVE | OUTPATIENT
Start: 2019-04-16 | End: 2019-04-16

## 2019-04-16 RX ORDER — ACETAMINOPHEN 325 MG/1
650 TABLET ORAL EVERY 6 HOURS PRN
Status: DISCONTINUED | OUTPATIENT
Start: 2019-04-16 | End: 2019-04-19 | Stop reason: HOSPADM

## 2019-04-16 RX ADMIN — POTASSIUM CHLORIDE 20 MEQ: 200 INJECTION, SOLUTION INTRAVENOUS at 14:06

## 2019-04-16 RX ADMIN — POTASSIUM CHLORIDE 20 MEQ: 14.9 INJECTION, SOLUTION INTRAVENOUS at 17:59

## 2019-04-16 RX ADMIN — POTASSIUM CHLORIDE 40 MEQ: 20 SOLUTION ORAL at 21:27

## 2019-04-16 RX ADMIN — MECLIZINE HYDROCHLORIDE 25 MG: 25 TABLET ORAL at 21:27

## 2019-04-16 RX ADMIN — POTASSIUM CHLORIDE 20 MEQ: 14.9 INJECTION, SOLUTION INTRAVENOUS at 16:55

## 2019-04-16 RX ADMIN — MULTIPLE VITAMINS W/ MINERALS TAB 1 TABLET: TAB at 17:27

## 2019-04-16 RX ADMIN — POTASSIUM CHLORIDE 20 MEQ: 200 INJECTION, SOLUTION INTRAVENOUS at 11:40

## 2019-04-16 RX ADMIN — POTASSIUM CHLORIDE 20 MEQ: 200 INJECTION, SOLUTION INTRAVENOUS at 15:25

## 2019-04-16 RX ADMIN — POTASSIUM CHLORIDE 40 MEQ: 20 SOLUTION ORAL at 12:34

## 2019-04-16 RX ADMIN — SODIUM BICARBONATE 650 MG: 650 TABLET ORAL at 17:27

## 2019-04-16 RX ADMIN — POTASSIUM CHLORIDE 20 MEQ: 14.9 INJECTION, SOLUTION INTRAVENOUS at 20:14

## 2019-04-16 RX ADMIN — FERROUS SULFATE TAB 325 MG (65 MG ELEMENTAL FE) 325 MG: 325 (65 FE) TAB at 17:26

## 2019-04-16 RX ADMIN — SODIUM CHLORIDE 20 ML/HR: 0.9 INJECTION, SOLUTION INTRAVENOUS at 08:25

## 2019-04-16 RX ADMIN — POTASSIUM CHLORIDE 20 MEQ: 200 INJECTION, SOLUTION INTRAVENOUS at 10:15

## 2019-04-16 RX ADMIN — POTASSIUM CHLORIDE 20 MEQ: 200 INJECTION, SOLUTION INTRAVENOUS at 12:33

## 2019-04-16 RX ADMIN — AMITRIPTYLINE HYDROCHLORIDE 50 MG: 50 TABLET, FILM COATED ORAL at 21:27

## 2019-04-16 RX ADMIN — POTASSIUM CHLORIDE 40 MEQ: 20 SOLUTION ORAL at 17:27

## 2019-04-16 NOTE — PROGRESS NOTES
Call placed to Dr Greg Velasquez to report K of 2 0, per Dr Greg Velasquez patient should be sent to the ED  Per Dr Greg Velasquez explain plan to Attending continuous infusion of 20mEq/hr for 8 hours straight, check K level at 4 hour and recheck when finshed, at that point will decide to admit or discharge  If any question please have attending call Dr Greg Velasquez  Report given to Dr Polk E  [address] [place] [address],Jake  [id number] per Dr Elías Trivedi

## 2019-04-17 LAB
ALBUMIN SERPL BCP-MCNC: 3.5 G/DL (ref 3.5–5)
ALP SERPL-CCNC: 40 U/L (ref 46–116)
ALT SERPL W P-5'-P-CCNC: 39 U/L (ref 12–78)
ANION GAP SERPL CALCULATED.3IONS-SCNC: 10 MMOL/L (ref 4–13)
ANION GAP SERPL CALCULATED.3IONS-SCNC: 14 MMOL/L (ref 4–13)
AST SERPL W P-5'-P-CCNC: 26 U/L (ref 5–45)
BILIRUB SERPL-MCNC: 0.3 MG/DL (ref 0.2–1)
BUN SERPL-MCNC: 16 MG/DL (ref 5–25)
BUN SERPL-MCNC: 17 MG/DL (ref 5–25)
CALCIUM SERPL-MCNC: 7.8 MG/DL (ref 8.3–10.1)
CALCIUM SERPL-MCNC: 8.4 MG/DL (ref 8.3–10.1)
CHLORIDE SERPL-SCNC: 104 MMOL/L (ref 100–108)
CHLORIDE SERPL-SCNC: 106 MMOL/L (ref 100–108)
CO2 SERPL-SCNC: 21 MMOL/L (ref 21–32)
CO2 SERPL-SCNC: 21 MMOL/L (ref 21–32)
CREAT SERPL-MCNC: 0.83 MG/DL (ref 0.6–1.3)
CREAT SERPL-MCNC: 0.86 MG/DL (ref 0.6–1.3)
ERYTHROCYTE [DISTWIDTH] IN BLOOD BY AUTOMATED COUNT: 14.8 % (ref 11.6–15.1)
GFR SERPL CREATININE-BSD FRML MDRD: 88 ML/MIN/1.73SQ M
GFR SERPL CREATININE-BSD FRML MDRD: 92 ML/MIN/1.73SQ M
GLUCOSE SERPL-MCNC: 81 MG/DL (ref 65–140)
GLUCOSE SERPL-MCNC: 85 MG/DL (ref 65–140)
HCT VFR BLD AUTO: 36.5 % (ref 34.8–46.1)
HGB BLD-MCNC: 12.1 G/DL (ref 11.5–15.4)
MAGNESIUM SERPL-MCNC: 2.2 MG/DL (ref 1.6–2.6)
MCH RBC QN AUTO: 30.6 PG (ref 26.8–34.3)
MCHC RBC AUTO-ENTMCNC: 33.2 G/DL (ref 31.4–37.4)
MCV RBC AUTO: 92 FL (ref 82–98)
PHOSPHATE SERPL-MCNC: 3.5 MG/DL (ref 2.7–4.5)
PLATELET # BLD AUTO: 266 THOUSANDS/UL (ref 149–390)
PMV BLD AUTO: 10.5 FL (ref 8.9–12.7)
POTASSIUM SERPL-SCNC: 2.6 MMOL/L (ref 3.5–5.3)
POTASSIUM SERPL-SCNC: 3.9 MMOL/L (ref 3.5–5.3)
PROT SERPL-MCNC: 6.8 G/DL (ref 6.4–8.2)
RBC # BLD AUTO: 3.96 MILLION/UL (ref 3.81–5.12)
SODIUM SERPL-SCNC: 137 MMOL/L (ref 136–145)
SODIUM SERPL-SCNC: 139 MMOL/L (ref 136–145)
WBC # BLD AUTO: 5.27 THOUSAND/UL (ref 4.31–10.16)

## 2019-04-17 PROCEDURE — 83735 ASSAY OF MAGNESIUM: CPT | Performed by: INTERNAL MEDICINE

## 2019-04-17 PROCEDURE — 80053 COMPREHEN METABOLIC PANEL: CPT | Performed by: INTERNAL MEDICINE

## 2019-04-17 PROCEDURE — 80048 BASIC METABOLIC PNL TOTAL CA: CPT | Performed by: INTERNAL MEDICINE

## 2019-04-17 PROCEDURE — 85027 COMPLETE CBC AUTOMATED: CPT | Performed by: INTERNAL MEDICINE

## 2019-04-17 PROCEDURE — 84100 ASSAY OF PHOSPHORUS: CPT | Performed by: INTERNAL MEDICINE

## 2019-04-17 PROCEDURE — 99232 SBSQ HOSP IP/OBS MODERATE 35: CPT | Performed by: INTERNAL MEDICINE

## 2019-04-17 RX ORDER — POTASSIUM CHLORIDE 14.9 MG/ML
20 INJECTION INTRAVENOUS
Status: COMPLETED | OUTPATIENT
Start: 2019-04-17 | End: 2019-04-17

## 2019-04-17 RX ORDER — POTASSIUM CHLORIDE 20MEQ/15ML
80 LIQUID (ML) ORAL 4 TIMES DAILY
Status: DISCONTINUED | OUTPATIENT
Start: 2019-04-17 | End: 2019-04-18

## 2019-04-17 RX ORDER — CALCIUM CARBONATE 200(500)MG
500 TABLET,CHEWABLE ORAL 3 TIMES DAILY
Status: DISCONTINUED | OUTPATIENT
Start: 2019-04-17 | End: 2019-04-19 | Stop reason: HOSPADM

## 2019-04-17 RX ORDER — POTASSIUM CHLORIDE 14.9 MG/ML
20 INJECTION INTRAVENOUS
Status: DISCONTINUED | OUTPATIENT
Start: 2019-04-17 | End: 2019-04-17

## 2019-04-17 RX ADMIN — POTASSIUM CHLORIDE 80 MEQ: 20 SOLUTION ORAL at 12:37

## 2019-04-17 RX ADMIN — FERROUS SULFATE TAB 325 MG (65 MG ELEMENTAL FE) 325 MG: 325 (65 FE) TAB at 16:15

## 2019-04-17 RX ADMIN — FERROUS SULFATE TAB 325 MG (65 MG ELEMENTAL FE) 325 MG: 325 (65 FE) TAB at 08:52

## 2019-04-17 RX ADMIN — ANTACID TABLETS 500 MG: 500 TABLET, CHEWABLE ORAL at 21:57

## 2019-04-17 RX ADMIN — SODIUM BICARBONATE 650 MG: 650 TABLET ORAL at 08:53

## 2019-04-17 RX ADMIN — POTASSIUM CHLORIDE 20 MEQ: 14.9 INJECTION, SOLUTION INTRAVENOUS at 12:37

## 2019-04-17 RX ADMIN — POTASSIUM CHLORIDE 80 MEQ: 20 SOLUTION ORAL at 21:58

## 2019-04-17 RX ADMIN — AMILORIDE HYDROCLORIDE 5 MG: 5 TABLET ORAL at 10:10

## 2019-04-17 RX ADMIN — POTASSIUM CHLORIDE 80 MEQ: 20 SOLUTION ORAL at 17:24

## 2019-04-17 RX ADMIN — POTASSIUM CHLORIDE 20 MEQ: 14.9 INJECTION, SOLUTION INTRAVENOUS at 10:10

## 2019-04-17 RX ADMIN — POTASSIUM CHLORIDE 80 MEQ: 20 SOLUTION ORAL at 10:12

## 2019-04-17 RX ADMIN — ANTACID TABLETS 500 MG: 500 TABLET, CHEWABLE ORAL at 16:15

## 2019-04-17 RX ADMIN — MULTIPLE VITAMINS W/ MINERALS TAB 1 TABLET: TAB at 17:25

## 2019-04-17 RX ADMIN — MECLIZINE HYDROCHLORIDE 25 MG: 25 TABLET ORAL at 21:57

## 2019-04-17 RX ADMIN — ANTACID TABLETS 500 MG: 500 TABLET, CHEWABLE ORAL at 10:19

## 2019-04-17 RX ADMIN — POTASSIUM CHLORIDE 20 MEQ: 14.9 INJECTION, SOLUTION INTRAVENOUS at 14:54

## 2019-04-17 RX ADMIN — POTASSIUM CHLORIDE 20 MEQ: 14.9 INJECTION, SOLUTION INTRAVENOUS at 16:14

## 2019-04-17 RX ADMIN — POTASSIUM CHLORIDE 20 MEQ: 14.9 INJECTION, SOLUTION INTRAVENOUS at 11:21

## 2019-04-17 RX ADMIN — POTASSIUM CHLORIDE 20 MEQ: 14.9 INJECTION, SOLUTION INTRAVENOUS at 13:49

## 2019-04-17 RX ADMIN — POTASSIUM CHLORIDE 20 MEQ: 14.9 INJECTION, SOLUTION INTRAVENOUS at 08:53

## 2019-04-17 RX ADMIN — AMITRIPTYLINE HYDROCHLORIDE 50 MG: 50 TABLET, FILM COATED ORAL at 21:57

## 2019-04-17 RX ADMIN — POTASSIUM CHLORIDE 20 MEQ: 14.9 INJECTION, SOLUTION INTRAVENOUS at 17:18

## 2019-04-17 RX ADMIN — Medication 100 MG: at 08:53

## 2019-04-17 RX ADMIN — SODIUM BICARBONATE 650 MG: 650 TABLET ORAL at 17:24

## 2019-04-17 RX ADMIN — Medication 50 MG: at 08:54

## 2019-04-18 LAB
ANION GAP SERPL CALCULATED.3IONS-SCNC: 10 MMOL/L (ref 4–13)
ANION GAP SERPL CALCULATED.3IONS-SCNC: 10 MMOL/L (ref 4–13)
ANION GAP SERPL CALCULATED.3IONS-SCNC: 11 MMOL/L (ref 4–13)
ANION GAP SERPL CALCULATED.3IONS-SCNC: 13 MMOL/L (ref 4–13)
BUN SERPL-MCNC: 12 MG/DL (ref 5–25)
BUN SERPL-MCNC: 14 MG/DL (ref 5–25)
BUN SERPL-MCNC: 15 MG/DL (ref 5–25)
BUN SERPL-MCNC: 15 MG/DL (ref 5–25)
CALCIUM SERPL-MCNC: 7.8 MG/DL (ref 8.3–10.1)
CALCIUM SERPL-MCNC: 8 MG/DL (ref 8.3–10.1)
CALCIUM SERPL-MCNC: 8.3 MG/DL (ref 8.3–10.1)
CALCIUM SERPL-MCNC: 8.3 MG/DL (ref 8.3–10.1)
CHLORIDE SERPL-SCNC: 104 MMOL/L (ref 100–108)
CHLORIDE SERPL-SCNC: 106 MMOL/L (ref 100–108)
CHLORIDE SERPL-SCNC: 107 MMOL/L (ref 100–108)
CHLORIDE SERPL-SCNC: 109 MMOL/L (ref 100–108)
CO2 SERPL-SCNC: 11 MMOL/L (ref 21–32)
CO2 SERPL-SCNC: 16 MMOL/L (ref 21–32)
CO2 SERPL-SCNC: 19 MMOL/L (ref 21–32)
CO2 SERPL-SCNC: 23 MMOL/L (ref 21–32)
CREAT SERPL-MCNC: 0.8 MG/DL (ref 0.6–1.3)
CREAT SERPL-MCNC: 0.81 MG/DL (ref 0.6–1.3)
CREAT SERPL-MCNC: 0.87 MG/DL (ref 0.6–1.3)
CREAT SERPL-MCNC: 0.97 MG/DL (ref 0.6–1.3)
GFR SERPL CREATININE-BSD FRML MDRD: 76 ML/MIN/1.73SQ M
GFR SERPL CREATININE-BSD FRML MDRD: 87 ML/MIN/1.73SQ M
GFR SERPL CREATININE-BSD FRML MDRD: 94 ML/MIN/1.73SQ M
GFR SERPL CREATININE-BSD FRML MDRD: 96 ML/MIN/1.73SQ M
GLUCOSE SERPL-MCNC: 73 MG/DL (ref 65–140)
GLUCOSE SERPL-MCNC: 77 MG/DL (ref 65–140)
GLUCOSE SERPL-MCNC: 81 MG/DL (ref 65–140)
GLUCOSE SERPL-MCNC: 81 MG/DL (ref 65–140)
MAGNESIUM SERPL-MCNC: 2.1 MG/DL (ref 1.6–2.6)
PHOSPHATE SERPL-MCNC: 1.6 MG/DL (ref 2.7–4.5)
PHOSPHATE SERPL-MCNC: 1.7 MG/DL (ref 2.7–4.5)
POTASSIUM SERPL-SCNC: 2.7 MMOL/L (ref 3.5–5.3)
POTASSIUM SERPL-SCNC: 5.4 MMOL/L (ref 3.5–5.3)
POTASSIUM SERPL-SCNC: 6.7 MMOL/L (ref 3.5–5.3)
POTASSIUM SERPL-SCNC: 7.1 MMOL/L (ref 3.5–5.3)
SODIUM SERPL-SCNC: 131 MMOL/L (ref 136–145)
SODIUM SERPL-SCNC: 135 MMOL/L (ref 136–145)
SODIUM SERPL-SCNC: 135 MMOL/L (ref 136–145)
SODIUM SERPL-SCNC: 138 MMOL/L (ref 136–145)

## 2019-04-18 PROCEDURE — 80048 BASIC METABOLIC PNL TOTAL CA: CPT | Performed by: INTERNAL MEDICINE

## 2019-04-18 PROCEDURE — 84100 ASSAY OF PHOSPHORUS: CPT | Performed by: INTERNAL MEDICINE

## 2019-04-18 PROCEDURE — 83735 ASSAY OF MAGNESIUM: CPT | Performed by: INTERNAL MEDICINE

## 2019-04-18 PROCEDURE — 93005 ELECTROCARDIOGRAM TRACING: CPT

## 2019-04-18 PROCEDURE — 99232 SBSQ HOSP IP/OBS MODERATE 35: CPT | Performed by: INTERNAL MEDICINE

## 2019-04-18 RX ORDER — POTASSIUM CHLORIDE 14.9 MG/ML
40 INJECTION INTRAVENOUS 2 TIMES DAILY
Status: DISCONTINUED | OUTPATIENT
Start: 2019-04-18 | End: 2019-04-18 | Stop reason: SDUPTHER

## 2019-04-18 RX ORDER — POTASSIUM CHLORIDE 20MEQ/15ML
80 LIQUID (ML) ORAL
Status: DISCONTINUED | OUTPATIENT
Start: 2019-04-20 | End: 2019-04-19 | Stop reason: HOSPADM

## 2019-04-18 RX ORDER — POTASSIUM CHLORIDE 14.9 MG/ML
20 INJECTION INTRAVENOUS ONCE
Status: COMPLETED | OUTPATIENT
Start: 2019-04-18 | End: 2019-04-18

## 2019-04-18 RX ORDER — POTASSIUM CHLORIDE 14.9 MG/ML
20 INJECTION INTRAVENOUS
Status: COMPLETED | OUTPATIENT
Start: 2019-04-18 | End: 2019-04-18

## 2019-04-18 RX ORDER — POTASSIUM CHLORIDE 20MEQ/15ML
80 LIQUID (ML) ORAL
Status: DISCONTINUED | OUTPATIENT
Start: 2019-04-19 | End: 2019-04-18

## 2019-04-18 RX ADMIN — POTASSIUM CHLORIDE 20 MEQ: 14.9 INJECTION, SOLUTION INTRAVENOUS at 14:43

## 2019-04-18 RX ADMIN — Medication 100 MG: at 08:35

## 2019-04-18 RX ADMIN — SODIUM BICARBONATE 650 MG: 650 TABLET ORAL at 08:35

## 2019-04-18 RX ADMIN — POTASSIUM CHLORIDE 80 MEQ: 20 SOLUTION ORAL at 11:26

## 2019-04-18 RX ADMIN — AMILORIDE HYDROCLORIDE 5 MG: 5 TABLET ORAL at 08:33

## 2019-04-18 RX ADMIN — ANTACID TABLETS 500 MG: 500 TABLET, CHEWABLE ORAL at 16:49

## 2019-04-18 RX ADMIN — Medication 12 MMOL: at 23:33

## 2019-04-18 RX ADMIN — POTASSIUM CHLORIDE 80 MEQ: 20 SOLUTION ORAL at 08:34

## 2019-04-18 RX ADMIN — ANTACID TABLETS 500 MG: 500 TABLET, CHEWABLE ORAL at 21:55

## 2019-04-18 RX ADMIN — POTASSIUM CHLORIDE 80 MEQ: 20 SOLUTION ORAL at 17:01

## 2019-04-18 RX ADMIN — AMITRIPTYLINE HYDROCHLORIDE 50 MG: 50 TABLET, FILM COATED ORAL at 21:55

## 2019-04-18 RX ADMIN — MULTIPLE VITAMINS W/ MINERALS TAB 1 TABLET: TAB at 16:51

## 2019-04-18 RX ADMIN — SODIUM BICARBONATE 650 MG: 650 TABLET ORAL at 17:01

## 2019-04-18 RX ADMIN — FERROUS SULFATE TAB 325 MG (65 MG ELEMENTAL FE) 325 MG: 325 (65 FE) TAB at 16:49

## 2019-04-18 RX ADMIN — ANTACID TABLETS 500 MG: 500 TABLET, CHEWABLE ORAL at 08:33

## 2019-04-18 RX ADMIN — POTASSIUM CHLORIDE 20 MEQ: 14.9 INJECTION, SOLUTION INTRAVENOUS at 09:52

## 2019-04-18 RX ADMIN — POTASSIUM CHLORIDE 20 MEQ: 14.9 INJECTION, SOLUTION INTRAVENOUS at 16:47

## 2019-04-18 RX ADMIN — POTASSIUM CHLORIDE 20 MEQ: 14.9 INJECTION, SOLUTION INTRAVENOUS at 06:05

## 2019-04-18 RX ADMIN — Medication 50 MG: at 08:33

## 2019-04-18 RX ADMIN — MECLIZINE HYDROCHLORIDE 25 MG: 25 TABLET ORAL at 21:55

## 2019-04-18 RX ADMIN — POTASSIUM CHLORIDE 20 MEQ: 14.9 INJECTION, SOLUTION INTRAVENOUS at 12:33

## 2019-04-18 RX ADMIN — FERROUS SULFATE TAB 325 MG (65 MG ELEMENTAL FE) 325 MG: 325 (65 FE) TAB at 08:34

## 2019-04-18 RX ADMIN — POTASSIUM CHLORIDE 20 MEQ: 14.9 INJECTION, SOLUTION INTRAVENOUS at 08:34

## 2019-04-18 RX ADMIN — POTASSIUM CHLORIDE 20 MEQ: 14.9 INJECTION, SOLUTION INTRAVENOUS at 13:40

## 2019-04-18 RX ADMIN — POTASSIUM CHLORIDE 20 MEQ: 14.9 INJECTION, SOLUTION INTRAVENOUS at 11:25

## 2019-04-18 RX ADMIN — POTASSIUM CHLORIDE 20 MEQ: 14.9 INJECTION, SOLUTION INTRAVENOUS at 15:39

## 2019-04-19 VITALS
HEIGHT: 62 IN | TEMPERATURE: 97.3 F | RESPIRATION RATE: 18 BRPM | SYSTOLIC BLOOD PRESSURE: 115 MMHG | BODY MASS INDEX: 26.74 KG/M2 | OXYGEN SATURATION: 100 % | DIASTOLIC BLOOD PRESSURE: 55 MMHG | WEIGHT: 145.28 LBS | HEART RATE: 92 BPM

## 2019-04-19 PROBLEM — E87.6 HYPOKALEMIA: Status: RESOLVED | Noted: 2018-07-07 | Resolved: 2019-04-19

## 2019-04-19 PROBLEM — R20.2 PARESTHESIA OF BOTH LOWER EXTREMITIES: Status: RESOLVED | Noted: 2018-08-15 | Resolved: 2019-04-19

## 2019-04-19 LAB
ANION GAP SERPL CALCULATED.3IONS-SCNC: 13 MMOL/L (ref 4–13)
ATRIAL RATE: 75 BPM
ATRIAL RATE: 82 BPM
BUN SERPL-MCNC: 13 MG/DL (ref 5–25)
CALCIUM SERPL-MCNC: 8 MG/DL (ref 8.3–10.1)
CHLORIDE SERPL-SCNC: 108 MMOL/L (ref 100–108)
CO2 SERPL-SCNC: 18 MMOL/L (ref 21–32)
CREAT SERPL-MCNC: 0.76 MG/DL (ref 0.6–1.3)
ERYTHROCYTE [DISTWIDTH] IN BLOOD BY AUTOMATED COUNT: 14.9 % (ref 11.6–15.1)
GFR SERPL CREATININE-BSD FRML MDRD: 102 ML/MIN/1.73SQ M
GLUCOSE SERPL-MCNC: 78 MG/DL (ref 65–140)
HCT VFR BLD AUTO: 36.3 % (ref 34.8–46.1)
HGB BLD-MCNC: 11.5 G/DL (ref 11.5–15.4)
MCH RBC QN AUTO: 30.6 PG (ref 26.8–34.3)
MCHC RBC AUTO-ENTMCNC: 31.7 G/DL (ref 31.4–37.4)
MCV RBC AUTO: 97 FL (ref 82–98)
P AXIS: 19 DEGREES
P AXIS: 63 DEGREES
PHOSPHATE SERPL-MCNC: 3 MG/DL (ref 2.7–4.5)
PLATELET # BLD AUTO: 232 THOUSANDS/UL (ref 149–390)
PMV BLD AUTO: 10.5 FL (ref 8.9–12.7)
POTASSIUM SERPL-SCNC: 4.4 MMOL/L (ref 3.5–5.3)
PR INTERVAL: 118 MS
PR INTERVAL: 132 MS
QRS AXIS: 43 DEGREES
QRS AXIS: 45 DEGREES
QRSD INTERVAL: 102 MS
QRSD INTERVAL: 118 MS
QT INTERVAL: 350 MS
QT INTERVAL: 394 MS
QTC INTERVAL: 408 MS
QTC INTERVAL: 439 MS
RBC # BLD AUTO: 3.76 MILLION/UL (ref 3.81–5.12)
SODIUM SERPL-SCNC: 139 MMOL/L (ref 136–145)
T WAVE AXIS: 40 DEGREES
T WAVE AXIS: 52 DEGREES
VENTRICULAR RATE: 75 BPM
VENTRICULAR RATE: 82 BPM
WBC # BLD AUTO: 5.11 THOUSAND/UL (ref 4.31–10.16)

## 2019-04-19 PROCEDURE — 84100 ASSAY OF PHOSPHORUS: CPT | Performed by: INTERNAL MEDICINE

## 2019-04-19 PROCEDURE — 93010 ELECTROCARDIOGRAM REPORT: CPT | Performed by: INTERNAL MEDICINE

## 2019-04-19 PROCEDURE — 85027 COMPLETE CBC AUTOMATED: CPT | Performed by: INTERNAL MEDICINE

## 2019-04-19 PROCEDURE — 80048 BASIC METABOLIC PNL TOTAL CA: CPT | Performed by: INTERNAL MEDICINE

## 2019-04-19 PROCEDURE — 99239 HOSP IP/OBS DSCHRG MGMT >30: CPT | Performed by: INTERNAL MEDICINE

## 2019-04-19 RX ORDER — POTASSIUM CHLORIDE 20MEQ/15ML
80 LIQUID (ML) ORAL
Qty: 10800 ML | Refills: 0 | Status: ON HOLD | OUTPATIENT
Start: 2019-04-20 | End: 2019-07-07

## 2019-04-19 RX ORDER — CALCIUM CARBONATE 200(500)MG
500 TABLET,CHEWABLE ORAL 3 TIMES DAILY
Qty: 3 TABLET | Refills: 0 | Status: SHIPPED | OUTPATIENT
Start: 2019-04-19 | End: 2019-04-20

## 2019-04-19 RX ADMIN — Medication 100 MG: at 09:31

## 2019-04-19 RX ADMIN — ANTACID TABLETS 500 MG: 500 TABLET, CHEWABLE ORAL at 09:30

## 2019-04-19 RX ADMIN — SODIUM BICARBONATE 650 MG: 650 TABLET ORAL at 09:34

## 2019-04-19 RX ADMIN — FERROUS SULFATE TAB 325 MG (65 MG ELEMENTAL FE) 325 MG: 325 (65 FE) TAB at 09:30

## 2019-04-19 RX ADMIN — AMILORIDE HYDROCLORIDE 5 MG: 5 TABLET ORAL at 09:30

## 2019-04-19 RX ADMIN — Medication 50 MG: at 09:31

## 2019-04-22 ENCOUNTER — HOSPITAL ENCOUNTER (OUTPATIENT)
Facility: HOSPITAL | Age: 34
Setting detail: OBSERVATION
Discharge: ANOTHER HOSPITAL | End: 2019-04-25
Attending: EMERGENCY MEDICINE | Admitting: INTERNAL MEDICINE
Payer: COMMERCIAL

## 2019-04-22 ENCOUNTER — HOSPITAL ENCOUNTER (OUTPATIENT)
Dept: INFUSION CENTER | Facility: HOSPITAL | Age: 34
Discharge: HOME | End: 2019-04-22
Payer: COMMERCIAL

## 2019-04-22 VITALS
SYSTOLIC BLOOD PRESSURE: 128 MMHG | DIASTOLIC BLOOD PRESSURE: 58 MMHG | RESPIRATION RATE: 16 BRPM | HEART RATE: 86 BPM | TEMPERATURE: 97.7 F

## 2019-04-22 DIAGNOSIS — E87.6 HYPOKALEMIA: Primary | ICD-10-CM

## 2019-04-22 DIAGNOSIS — R53.83 FATIGUE: ICD-10-CM

## 2019-04-22 LAB
ALBUMIN SERPL BCP-MCNC: 3.8 G/DL (ref 3.5–5)
ALP SERPL-CCNC: 44 U/L (ref 46–116)
ALT SERPL W P-5'-P-CCNC: 43 U/L (ref 12–78)
ANION GAP SERPL CALCULATED.3IONS-SCNC: 10 MMOL/L (ref 4–13)
ANION GAP SERPL CALCULATED.3IONS-SCNC: 13 MMOL/L (ref 4–13)
ANION GAP SERPL CALCULATED.3IONS-SCNC: 13 MMOL/L (ref 4–13)
AST SERPL W P-5'-P-CCNC: 24 U/L (ref 5–45)
ATRIAL RATE: 79 BPM
BASOPHILS # BLD AUTO: 0.06 THOUSANDS/ΜL (ref 0–0.1)
BASOPHILS NFR BLD AUTO: 1 % (ref 0–1)
BILIRUB SERPL-MCNC: 0.3 MG/DL (ref 0.2–1)
BUN SERPL-MCNC: 15 MG/DL (ref 5–25)
BUN SERPL-MCNC: 16 MG/DL (ref 5–25)
BUN SERPL-MCNC: 16 MG/DL (ref 5–25)
CALCIUM SERPL-MCNC: 7.8 MG/DL (ref 8.3–10.1)
CALCIUM SERPL-MCNC: 8.1 MG/DL (ref 8.3–10.1)
CALCIUM SERPL-MCNC: 8.7 MG/DL (ref 8.3–10.1)
CHLORIDE SERPL-SCNC: 101 MMOL/L (ref 100–108)
CHLORIDE SERPL-SCNC: 102 MMOL/L (ref 100–108)
CHLORIDE SERPL-SCNC: 105 MMOL/L (ref 100–108)
CO2 SERPL-SCNC: 21 MMOL/L (ref 21–32)
CO2 SERPL-SCNC: 22 MMOL/L (ref 21–32)
CO2 SERPL-SCNC: 22 MMOL/L (ref 21–32)
CREAT SERPL-MCNC: 0.98 MG/DL (ref 0.6–1.3)
CREAT SERPL-MCNC: 0.99 MG/DL (ref 0.6–1.3)
CREAT SERPL-MCNC: 1.05 MG/DL (ref 0.6–1.3)
EOSINOPHIL # BLD AUTO: 0.27 THOUSAND/ΜL (ref 0–0.61)
EOSINOPHIL NFR BLD AUTO: 5 % (ref 0–6)
ERYTHROCYTE [DISTWIDTH] IN BLOOD BY AUTOMATED COUNT: 14.6 % (ref 11.6–15.1)
GFR SERPL CREATININE-BSD FRML MDRD: 69 ML/MIN/1.73SQ M
GFR SERPL CREATININE-BSD FRML MDRD: 74 ML/MIN/1.73SQ M
GFR SERPL CREATININE-BSD FRML MDRD: 75 ML/MIN/1.73SQ M
GLUCOSE P FAST SERPL-MCNC: 90 MG/DL (ref 65–99)
GLUCOSE SERPL-MCNC: 126 MG/DL (ref 65–140)
GLUCOSE SERPL-MCNC: 81 MG/DL (ref 65–140)
GLUCOSE SERPL-MCNC: 90 MG/DL (ref 65–140)
HCT VFR BLD AUTO: 38.3 % (ref 34.8–46.1)
HGB BLD-MCNC: 12.8 G/DL (ref 11.5–15.4)
IMM GRANULOCYTES # BLD AUTO: 0.01 THOUSAND/UL (ref 0–0.2)
IMM GRANULOCYTES NFR BLD AUTO: 0 % (ref 0–2)
LYMPHOCYTES # BLD AUTO: 1.86 THOUSANDS/ΜL (ref 0.6–4.47)
LYMPHOCYTES NFR BLD AUTO: 34 % (ref 14–44)
MAGNESIUM SERPL-MCNC: 2.2 MG/DL (ref 1.6–2.6)
MCH RBC QN AUTO: 30.8 PG (ref 26.8–34.3)
MCHC RBC AUTO-ENTMCNC: 33.4 G/DL (ref 31.4–37.4)
MCV RBC AUTO: 92 FL (ref 82–98)
MONOCYTES # BLD AUTO: 0.45 THOUSAND/ΜL (ref 0.17–1.22)
MONOCYTES NFR BLD AUTO: 8 % (ref 4–12)
NEUTROPHILS # BLD AUTO: 2.87 THOUSANDS/ΜL (ref 1.85–7.62)
NEUTS SEG NFR BLD AUTO: 52 % (ref 43–75)
NRBC BLD AUTO-RTO: 0 /100 WBCS
P AXIS: 74 DEGREES
PHOSPHATE SERPL-MCNC: 4.2 MG/DL (ref 2.7–4.5)
PLATELET # BLD AUTO: 248 THOUSANDS/UL (ref 149–390)
PMV BLD AUTO: 10.3 FL (ref 8.9–12.7)
POTASSIUM SERPL-SCNC: 2.1 MMOL/L (ref 3.5–5.3)
POTASSIUM SERPL-SCNC: 2.2 MMOL/L (ref 3.5–5.3)
POTASSIUM SERPL-SCNC: 3.5 MMOL/L (ref 3.5–5.3)
PR INTERVAL: 126 MS
PROT SERPL-MCNC: 7.3 G/DL (ref 6.4–8.2)
QRS AXIS: 55 DEGREES
QRSD INTERVAL: 106 MS
QT INTERVAL: 386 MS
QTC INTERVAL: 442 MS
RBC # BLD AUTO: 4.16 MILLION/UL (ref 3.81–5.12)
SODIUM SERPL-SCNC: 136 MMOL/L (ref 136–145)
SODIUM SERPL-SCNC: 136 MMOL/L (ref 136–145)
SODIUM SERPL-SCNC: 137 MMOL/L (ref 136–145)
T WAVE AXIS: 80 DEGREES
TROPONIN I SERPL-MCNC: <0.02 NG/ML
TROPONIN I SERPL-MCNC: <0.02 NG/ML
VENTRICULAR RATE: 79 BPM
WBC # BLD AUTO: 5.52 THOUSAND/UL (ref 4.31–10.16)

## 2019-04-22 PROCEDURE — 99284 EMERGENCY DEPT VISIT MOD MDM: CPT | Performed by: PHYSICIAN ASSISTANT

## 2019-04-22 PROCEDURE — 93010 ELECTROCARDIOGRAM REPORT: CPT | Performed by: INTERNAL MEDICINE

## 2019-04-22 PROCEDURE — 84100 ASSAY OF PHOSPHORUS: CPT | Performed by: INTERNAL MEDICINE

## 2019-04-22 PROCEDURE — 80048 BASIC METABOLIC PNL TOTAL CA: CPT | Performed by: INTERNAL MEDICINE

## 2019-04-22 PROCEDURE — 84484 ASSAY OF TROPONIN QUANT: CPT | Performed by: EMERGENCY MEDICINE

## 2019-04-22 PROCEDURE — 85025 COMPLETE CBC W/AUTO DIFF WBC: CPT | Performed by: EMERGENCY MEDICINE

## 2019-04-22 PROCEDURE — 83735 ASSAY OF MAGNESIUM: CPT | Performed by: INTERNAL MEDICINE

## 2019-04-22 PROCEDURE — 80053 COMPREHEN METABOLIC PANEL: CPT | Performed by: EMERGENCY MEDICINE

## 2019-04-22 PROCEDURE — 84484 ASSAY OF TROPONIN QUANT: CPT | Performed by: INTERNAL MEDICINE

## 2019-04-22 PROCEDURE — 99284 EMERGENCY DEPT VISIT MOD MDM: CPT

## 2019-04-22 PROCEDURE — 99220 PR INITIAL OBSERVATION CARE/DAY 70 MINUTES: CPT | Performed by: INTERNAL MEDICINE

## 2019-04-22 PROCEDURE — 93005 ELECTROCARDIOGRAM TRACING: CPT

## 2019-04-22 RX ORDER — THIAMINE MONONITRATE (VIT B1) 100 MG
100 TABLET ORAL DAILY
Status: DISCONTINUED | OUTPATIENT
Start: 2019-04-22 | End: 2019-04-25 | Stop reason: HOSPADM

## 2019-04-22 RX ORDER — FERROUS SULFATE 325(65) MG
325 TABLET ORAL 2 TIMES DAILY WITH MEALS
Status: DISCONTINUED | OUTPATIENT
Start: 2019-04-22 | End: 2019-04-25 | Stop reason: HOSPADM

## 2019-04-22 RX ORDER — SODIUM BICARBONATE 650 MG/1
650 TABLET ORAL 2 TIMES DAILY
Status: DISCONTINUED | OUTPATIENT
Start: 2019-04-22 | End: 2019-04-25 | Stop reason: HOSPADM

## 2019-04-22 RX ORDER — AMITRIPTYLINE HYDROCHLORIDE 50 MG/1
50 TABLET, FILM COATED ORAL
Status: DISCONTINUED | OUTPATIENT
Start: 2019-04-22 | End: 2019-04-25 | Stop reason: HOSPADM

## 2019-04-22 RX ORDER — POTASSIUM CHLORIDE 20MEQ/15ML
80 LIQUID (ML) ORAL
Status: DISCONTINUED | OUTPATIENT
Start: 2019-04-22 | End: 2019-04-23

## 2019-04-22 RX ORDER — POTASSIUM CHLORIDE 14.9 MG/ML
INJECTION INTRAVENOUS
Status: COMPLETED
Start: 2019-04-22 | End: 2019-04-22

## 2019-04-22 RX ORDER — MECLIZINE HYDROCHLORIDE 25 MG/1
25 TABLET ORAL
Status: DISCONTINUED | OUTPATIENT
Start: 2019-04-22 | End: 2019-04-25 | Stop reason: HOSPADM

## 2019-04-22 RX ORDER — POTASSIUM CHLORIDE 14.9 MG/ML
20 INJECTION INTRAVENOUS
Status: COMPLETED | OUTPATIENT
Start: 2019-04-22 | End: 2019-04-22

## 2019-04-22 RX ORDER — ZINC GLUCONATE 50 MG
50 TABLET ORAL DAILY
Status: DISCONTINUED | OUTPATIENT
Start: 2019-04-22 | End: 2019-04-25 | Stop reason: HOSPADM

## 2019-04-22 RX ORDER — POTASSIUM CHLORIDE 14.9 MG/ML
40 INJECTION INTRAVENOUS ONCE
Status: COMPLETED | OUTPATIENT
Start: 2019-04-22 | End: 2019-04-22

## 2019-04-22 RX ORDER — POTASSIUM CHLORIDE 14.9 MG/ML
20 INJECTION INTRAVENOUS
Status: DISCONTINUED | OUTPATIENT
Start: 2019-04-22 | End: 2019-04-22

## 2019-04-22 RX ORDER — ACETAMINOPHEN 325 MG/1
650 TABLET ORAL EVERY 6 HOURS PRN
Status: DISCONTINUED | OUTPATIENT
Start: 2019-04-22 | End: 2019-04-25 | Stop reason: HOSPADM

## 2019-04-22 RX ORDER — AMILORIDE HYDROCHLORIDE 5 MG/1
5 TABLET ORAL DAILY
Status: DISCONTINUED | OUTPATIENT
Start: 2019-04-22 | End: 2019-04-25 | Stop reason: HOSPADM

## 2019-04-22 RX ORDER — ERGOCALCIFEROL 1.25 MG/1
50000 CAPSULE ORAL WEEKLY
Status: DISCONTINUED | OUTPATIENT
Start: 2019-04-22 | End: 2019-04-25 | Stop reason: HOSPADM

## 2019-04-22 RX ADMIN — POTASSIUM CHLORIDE 80 MEQ: 20 SOLUTION ORAL at 16:00

## 2019-04-22 RX ADMIN — POTASSIUM CHLORIDE 20 MEQ: 14.9 INJECTION, SOLUTION INTRAVENOUS at 12:21

## 2019-04-22 RX ADMIN — POTASSIUM CHLORIDE 20 MEQ: 14.9 INJECTION, SOLUTION INTRAVENOUS at 16:00

## 2019-04-22 RX ADMIN — POTASSIUM CHLORIDE 20 MEQ: 14.9 INJECTION, SOLUTION INTRAVENOUS at 12:26

## 2019-04-22 RX ADMIN — POTASSIUM CHLORIDE 20 MEQ: 14.9 INJECTION, SOLUTION INTRAVENOUS at 20:50

## 2019-04-22 RX ADMIN — POTASSIUM CHLORIDE 20 MEQ: 14.9 INJECTION, SOLUTION INTRAVENOUS at 21:56

## 2019-04-22 RX ADMIN — POTASSIUM CHLORIDE 80 MEQ: 20 SOLUTION ORAL at 18:59

## 2019-04-22 RX ADMIN — POTASSIUM CHLORIDE 20 MEQ: 14.9 INJECTION, SOLUTION INTRAVENOUS at 11:16

## 2019-04-22 RX ADMIN — POTASSIUM CHLORIDE 20 MEQ: 14.9 INJECTION INTRAVENOUS at 12:26

## 2019-04-22 RX ADMIN — POTASSIUM CHLORIDE 20 MEQ: 14.9 INJECTION, SOLUTION INTRAVENOUS at 13:40

## 2019-04-22 RX ADMIN — MECLIZINE HYDROCHLORIDE 25 MG: 25 TABLET ORAL at 21:56

## 2019-04-22 RX ADMIN — AMITRIPTYLINE HYDROCHLORIDE 50 MG: 50 TABLET, FILM COATED ORAL at 21:56

## 2019-04-22 RX ADMIN — POTASSIUM CHLORIDE 80 MEQ: 20 SOLUTION ORAL at 21:56

## 2019-04-22 RX ADMIN — POTASSIUM CHLORIDE 80 MEQ: 20 SOLUTION ORAL at 13:39

## 2019-04-22 RX ADMIN — FERROUS SULFATE TAB 325 MG (65 MG ELEMENTAL FE) 325 MG: 325 (65 FE) TAB at 17:30

## 2019-04-22 RX ADMIN — POTASSIUM CHLORIDE 20 MEQ: 14.9 INJECTION, SOLUTION INTRAVENOUS at 19:52

## 2019-04-22 RX ADMIN — POTASSIUM CHLORIDE 20 MEQ: 14.9 INJECTION, SOLUTION INTRAVENOUS at 10:07

## 2019-04-22 RX ADMIN — POTASSIUM CHLORIDE 20 MEQ: 14.9 INJECTION, SOLUTION INTRAVENOUS at 14:48

## 2019-04-22 RX ADMIN — POTASSIUM CHLORIDE 20 MEQ: 14.9 INJECTION, SOLUTION INTRAVENOUS at 18:59

## 2019-04-22 RX ADMIN — SODIUM BICARBONATE 650 MG: 650 TABLET ORAL at 17:30

## 2019-04-23 LAB
ALBUMIN SERPL BCP-MCNC: 3.2 G/DL (ref 3.5–5)
ALP SERPL-CCNC: 37 U/L (ref 46–116)
ALT SERPL W P-5'-P-CCNC: 37 U/L (ref 12–78)
ANION GAP SERPL CALCULATED.3IONS-SCNC: 11 MMOL/L (ref 4–13)
AST SERPL W P-5'-P-CCNC: 23 U/L (ref 5–45)
BILIRUB SERPL-MCNC: 0.3 MG/DL (ref 0.2–1)
BUN SERPL-MCNC: 13 MG/DL (ref 5–25)
CALCIUM SERPL-MCNC: 7.9 MG/DL (ref 8.3–10.1)
CHLORIDE SERPL-SCNC: 107 MMOL/L (ref 100–108)
CO2 SERPL-SCNC: 20 MMOL/L (ref 21–32)
CREAT SERPL-MCNC: 0.92 MG/DL (ref 0.6–1.3)
ERYTHROCYTE [DISTWIDTH] IN BLOOD BY AUTOMATED COUNT: 14.8 % (ref 11.6–15.1)
GFR SERPL CREATININE-BSD FRML MDRD: 81 ML/MIN/1.73SQ M
GLUCOSE P FAST SERPL-MCNC: 83 MG/DL (ref 65–99)
GLUCOSE SERPL-MCNC: 83 MG/DL (ref 65–140)
HCT VFR BLD AUTO: 35 % (ref 34.8–46.1)
HGB BLD-MCNC: 11.7 G/DL (ref 11.5–15.4)
MAGNESIUM SERPL-MCNC: 2.1 MG/DL (ref 1.6–2.6)
MCH RBC QN AUTO: 31.5 PG (ref 26.8–34.3)
MCHC RBC AUTO-ENTMCNC: 33.4 G/DL (ref 31.4–37.4)
MCV RBC AUTO: 94 FL (ref 82–98)
PHOSPHATE SERPL-MCNC: 3 MG/DL (ref 2.7–4.5)
PLATELET # BLD AUTO: 210 THOUSANDS/UL (ref 149–390)
PMV BLD AUTO: 10.3 FL (ref 8.9–12.7)
POTASSIUM SERPL-SCNC: 3 MMOL/L (ref 3.5–5.3)
PROT SERPL-MCNC: 6.2 G/DL (ref 6.4–8.2)
RBC # BLD AUTO: 3.72 MILLION/UL (ref 3.81–5.12)
SODIUM SERPL-SCNC: 138 MMOL/L (ref 136–145)
WBC # BLD AUTO: 5.26 THOUSAND/UL (ref 4.31–10.16)

## 2019-04-23 PROCEDURE — 83735 ASSAY OF MAGNESIUM: CPT | Performed by: INTERNAL MEDICINE

## 2019-04-23 PROCEDURE — 80053 COMPREHEN METABOLIC PANEL: CPT | Performed by: INTERNAL MEDICINE

## 2019-04-23 PROCEDURE — 99225 PR SBSQ OBSERVATION CARE/DAY 25 MINUTES: CPT | Performed by: FAMILY MEDICINE

## 2019-04-23 PROCEDURE — 85027 COMPLETE CBC AUTOMATED: CPT | Performed by: INTERNAL MEDICINE

## 2019-04-23 PROCEDURE — 84100 ASSAY OF PHOSPHORUS: CPT | Performed by: INTERNAL MEDICINE

## 2019-04-23 RX ORDER — POTASSIUM CHLORIDE 20MEQ/15ML
60 LIQUID (ML) ORAL
Status: DISCONTINUED | OUTPATIENT
Start: 2019-04-23 | End: 2019-04-25 | Stop reason: HOSPADM

## 2019-04-23 RX ORDER — POTASSIUM CHLORIDE 14.9 MG/ML
20 INJECTION INTRAVENOUS
Status: DISCONTINUED | OUTPATIENT
Start: 2019-04-23 | End: 2019-04-23

## 2019-04-23 RX ORDER — POTASSIUM CHLORIDE 14.9 MG/ML
20 INJECTION INTRAVENOUS
Status: COMPLETED | OUTPATIENT
Start: 2019-04-23 | End: 2019-04-23

## 2019-04-23 RX ADMIN — POTASSIUM CHLORIDE 60 MEQ: 20 SOLUTION ORAL at 13:42

## 2019-04-23 RX ADMIN — SODIUM BICARBONATE 650 MG: 650 TABLET ORAL at 08:23

## 2019-04-23 RX ADMIN — POTASSIUM CHLORIDE 60 MEQ: 20 SOLUTION ORAL at 11:30

## 2019-04-23 RX ADMIN — POTASSIUM CHLORIDE 20 MEQ: 14.9 INJECTION, SOLUTION INTRAVENOUS at 08:20

## 2019-04-23 RX ADMIN — AMILORIDE HYDROCLORIDE 5 MG: 5 TABLET ORAL at 09:23

## 2019-04-23 RX ADMIN — POTASSIUM CHLORIDE 60 MEQ: 20 SOLUTION ORAL at 16:30

## 2019-04-23 RX ADMIN — AMITRIPTYLINE HYDROCHLORIDE 50 MG: 50 TABLET, FILM COATED ORAL at 21:16

## 2019-04-23 RX ADMIN — Medication 50 MG: at 09:23

## 2019-04-23 RX ADMIN — FERROUS SULFATE TAB 325 MG (65 MG ELEMENTAL FE) 325 MG: 325 (65 FE) TAB at 08:23

## 2019-04-23 RX ADMIN — POTASSIUM CHLORIDE 20 MEQ: 14.9 INJECTION, SOLUTION INTRAVENOUS at 09:24

## 2019-04-23 RX ADMIN — FERROUS SULFATE TAB 325 MG (65 MG ELEMENTAL FE) 325 MG: 325 (65 FE) TAB at 16:30

## 2019-04-23 RX ADMIN — SODIUM BICARBONATE 650 MG: 650 TABLET ORAL at 17:49

## 2019-04-23 RX ADMIN — MECLIZINE HYDROCHLORIDE 25 MG: 25 TABLET ORAL at 21:16

## 2019-04-23 RX ADMIN — POTASSIUM CHLORIDE 60 MEQ: 20 SOLUTION ORAL at 22:02

## 2019-04-23 RX ADMIN — POTASSIUM CHLORIDE 20 MEQ: 14.9 INJECTION, SOLUTION INTRAVENOUS at 10:27

## 2019-04-23 RX ADMIN — B-COMPLEX W/ C & FOLIC ACID TAB 1 TABLET: TAB at 09:23

## 2019-04-23 RX ADMIN — POTASSIUM CHLORIDE 80 MEQ: 20 SOLUTION ORAL at 06:00

## 2019-04-23 RX ADMIN — Medication 100 MG: at 08:23

## 2019-04-23 RX ADMIN — POTASSIUM CHLORIDE 60 MEQ: 20 SOLUTION ORAL at 19:30

## 2019-04-23 RX ADMIN — POTASSIUM CHLORIDE 20 MEQ: 14.9 INJECTION, SOLUTION INTRAVENOUS at 11:31

## 2019-04-24 LAB
ANION GAP SERPL CALCULATED.3IONS-SCNC: 11 MMOL/L (ref 4–13)
BUN SERPL-MCNC: 13 MG/DL (ref 5–25)
CALCIUM SERPL-MCNC: 8.4 MG/DL (ref 8.3–10.1)
CHLORIDE SERPL-SCNC: 105 MMOL/L (ref 100–108)
CO2 SERPL-SCNC: 21 MMOL/L (ref 21–32)
CREAT SERPL-MCNC: 0.85 MG/DL (ref 0.6–1.3)
GFR SERPL CREATININE-BSD FRML MDRD: 89 ML/MIN/1.73SQ M
GLUCOSE SERPL-MCNC: 80 MG/DL (ref 65–140)
MAGNESIUM SERPL-MCNC: 2.1 MG/DL (ref 1.6–2.6)
POTASSIUM SERPL-SCNC: 2.8 MMOL/L (ref 3.5–5.3)
SODIUM SERPL-SCNC: 137 MMOL/L (ref 136–145)

## 2019-04-24 PROCEDURE — 99217 PR OBSERVATION CARE DISCHARGE MANAGEMENT: CPT | Performed by: FAMILY MEDICINE

## 2019-04-24 PROCEDURE — 80048 BASIC METABOLIC PNL TOTAL CA: CPT | Performed by: FAMILY MEDICINE

## 2019-04-24 PROCEDURE — 83735 ASSAY OF MAGNESIUM: CPT | Performed by: FAMILY MEDICINE

## 2019-04-24 RX ORDER — POTASSIUM CHLORIDE 14.9 MG/ML
20 INJECTION INTRAVENOUS
Status: COMPLETED | OUTPATIENT
Start: 2019-04-24 | End: 2019-04-25

## 2019-04-24 RX ORDER — SPIRONOLACTONE 25 MG/1
25 TABLET ORAL DAILY
Status: DISCONTINUED | OUTPATIENT
Start: 2019-04-24 | End: 2019-04-25 | Stop reason: HOSPADM

## 2019-04-24 RX ORDER — POTASSIUM CHLORIDE 14.9 MG/ML
20 INJECTION INTRAVENOUS
Status: COMPLETED | OUTPATIENT
Start: 2019-04-24 | End: 2019-04-24

## 2019-04-24 RX ADMIN — POTASSIUM CHLORIDE 60 MEQ: 20 SOLUTION ORAL at 10:15

## 2019-04-24 RX ADMIN — POTASSIUM CHLORIDE 60 MEQ: 20 SOLUTION ORAL at 06:32

## 2019-04-24 RX ADMIN — POTASSIUM CHLORIDE 60 MEQ: 20 SOLUTION ORAL at 21:32

## 2019-04-24 RX ADMIN — SODIUM BICARBONATE 650 MG: 650 TABLET ORAL at 17:05

## 2019-04-24 RX ADMIN — B-COMPLEX W/ C & FOLIC ACID TAB 1 TABLET: TAB at 09:16

## 2019-04-24 RX ADMIN — POTASSIUM CHLORIDE 20 MEQ: 14.9 INJECTION, SOLUTION INTRAVENOUS at 10:16

## 2019-04-24 RX ADMIN — POTASSIUM CHLORIDE 20 MEQ: 14.9 INJECTION, SOLUTION INTRAVENOUS at 12:50

## 2019-04-24 RX ADMIN — POTASSIUM CHLORIDE 20 MEQ: 14.9 INJECTION, SOLUTION INTRAVENOUS at 16:16

## 2019-04-24 RX ADMIN — POTASSIUM CHLORIDE 20 MEQ: 14.9 INJECTION, SOLUTION INTRAVENOUS at 14:04

## 2019-04-24 RX ADMIN — Medication 100 MG: at 09:16

## 2019-04-24 RX ADMIN — POTASSIUM CHLORIDE 60 MEQ: 20 SOLUTION ORAL at 12:55

## 2019-04-24 RX ADMIN — Medication 50 MG: at 09:15

## 2019-04-24 RX ADMIN — POTASSIUM CHLORIDE 20 MEQ: 14.9 INJECTION, SOLUTION INTRAVENOUS at 15:11

## 2019-04-24 RX ADMIN — SODIUM BICARBONATE 650 MG: 650 TABLET ORAL at 09:16

## 2019-04-24 RX ADMIN — FERROUS SULFATE TAB 325 MG (65 MG ELEMENTAL FE) 325 MG: 325 (65 FE) TAB at 17:05

## 2019-04-24 RX ADMIN — POTASSIUM CHLORIDE 60 MEQ: 20 SOLUTION ORAL at 16:16

## 2019-04-24 RX ADMIN — FERROUS SULFATE TAB 325 MG (65 MG ELEMENTAL FE) 325 MG: 325 (65 FE) TAB at 09:16

## 2019-04-24 RX ADMIN — AMITRIPTYLINE HYDROCHLORIDE 50 MG: 50 TABLET, FILM COATED ORAL at 21:33

## 2019-04-24 RX ADMIN — POTASSIUM CHLORIDE 20 MEQ: 14.9 INJECTION, SOLUTION INTRAVENOUS at 17:21

## 2019-04-24 RX ADMIN — MECLIZINE HYDROCHLORIDE 25 MG: 25 TABLET ORAL at 21:33

## 2019-04-24 RX ADMIN — AMILORIDE HYDROCLORIDE 5 MG: 5 TABLET ORAL at 09:15

## 2019-04-24 RX ADMIN — POTASSIUM CHLORIDE 20 MEQ: 14.9 INJECTION, SOLUTION INTRAVENOUS at 11:21

## 2019-04-24 RX ADMIN — SPIRONOLACTONE 25 MG: 25 TABLET ORAL at 09:16

## 2019-04-24 RX ADMIN — POTASSIUM CHLORIDE 20 MEQ: 14.9 INJECTION, SOLUTION INTRAVENOUS at 09:10

## 2019-04-24 RX ADMIN — POTASSIUM CHLORIDE 60 MEQ: 20 SOLUTION ORAL at 18:37

## 2019-04-25 ENCOUNTER — HOSPITAL ENCOUNTER (OUTPATIENT)
Dept: INFUSION CENTER | Facility: HOSPITAL | Age: 34
End: 2019-04-25

## 2019-04-25 VITALS
RESPIRATION RATE: 18 BRPM | SYSTOLIC BLOOD PRESSURE: 102 MMHG | WEIGHT: 151.24 LBS | HEART RATE: 84 BPM | BODY MASS INDEX: 27.83 KG/M2 | HEIGHT: 62 IN | DIASTOLIC BLOOD PRESSURE: 56 MMHG | TEMPERATURE: 97.9 F | OXYGEN SATURATION: 100 %

## 2019-04-25 LAB
ANION GAP SERPL CALCULATED.3IONS-SCNC: 9 MMOL/L (ref 4–13)
BUN SERPL-MCNC: 14 MG/DL (ref 5–25)
CALCIUM SERPL-MCNC: 8.6 MG/DL (ref 8.3–10.1)
CHLORIDE SERPL-SCNC: 107 MMOL/L (ref 100–108)
CO2 SERPL-SCNC: 22 MMOL/L (ref 21–32)
CREAT SERPL-MCNC: 0.87 MG/DL (ref 0.6–1.3)
GFR SERPL CREATININE-BSD FRML MDRD: 87 ML/MIN/1.73SQ M
GLUCOSE SERPL-MCNC: 80 MG/DL (ref 65–140)
POTASSIUM SERPL-SCNC: 3.3 MMOL/L (ref 3.5–5.3)
SODIUM SERPL-SCNC: 138 MMOL/L (ref 136–145)

## 2019-04-25 PROCEDURE — 80048 BASIC METABOLIC PNL TOTAL CA: CPT | Performed by: FAMILY MEDICINE

## 2019-04-25 PROCEDURE — 99225 PR SBSQ OBSERVATION CARE/DAY 25 MINUTES: CPT | Performed by: PHYSICIAN ASSISTANT

## 2019-04-25 RX ADMIN — POTASSIUM CHLORIDE 60 MEQ: 20 SOLUTION ORAL at 06:47

## 2019-05-13 ENCOUNTER — HOSPITAL ENCOUNTER (OUTPATIENT)
Dept: INFUSION CENTER | Facility: HOSPITAL | Age: 34
Discharge: HOME | End: 2019-05-13
Payer: COMMERCIAL

## 2019-05-13 VITALS
RESPIRATION RATE: 16 BRPM | HEART RATE: 74 BPM | DIASTOLIC BLOOD PRESSURE: 46 MMHG | TEMPERATURE: 97.6 F | SYSTOLIC BLOOD PRESSURE: 95 MMHG | OXYGEN SATURATION: 99 %

## 2019-05-13 LAB
ANION GAP SERPL CALCULATED.3IONS-SCNC: 9 MMOL/L (ref 4–13)
BUN SERPL-MCNC: 11 MG/DL (ref 5–25)
CALCIUM SERPL-MCNC: 8.2 MG/DL (ref 8.3–10.1)
CHLORIDE SERPL-SCNC: 105 MMOL/L (ref 100–108)
CO2 SERPL-SCNC: 24 MMOL/L (ref 21–32)
CREAT SERPL-MCNC: 0.82 MG/DL (ref 0.6–1.3)
GFR SERPL CREATININE-BSD FRML MDRD: 93 ML/MIN/1.73SQ M
GLUCOSE SERPL-MCNC: 71 MG/DL (ref 65–140)
POTASSIUM SERPL-SCNC: 3.2 MMOL/L (ref 3.5–5.3)
SODIUM SERPL-SCNC: 138 MMOL/L (ref 136–145)

## 2019-05-13 PROCEDURE — 96366 THER/PROPH/DIAG IV INF ADDON: CPT

## 2019-05-13 PROCEDURE — 80048 BASIC METABOLIC PNL TOTAL CA: CPT | Performed by: INTERNAL MEDICINE

## 2019-05-13 PROCEDURE — 96365 THER/PROPH/DIAG IV INF INIT: CPT

## 2019-05-13 RX ORDER — SODIUM CHLORIDE 9 MG/ML
20 INJECTION, SOLUTION INTRAVENOUS CONTINUOUS
Status: DISPENSED | OUTPATIENT
Start: 2019-05-13 | End: 2019-05-13

## 2019-05-13 RX ADMIN — SODIUM CHLORIDE 20 ML/HR: 0.9 INJECTION, SOLUTION INTRAVENOUS at 08:20

## 2019-05-13 RX ADMIN — POTASSIUM CHLORIDE: 149 INJECTION, SOLUTION, CONCENTRATE INTRAVENOUS at 09:04

## 2019-05-15 RX ORDER — SODIUM CHLORIDE 9 MG/ML
20 INJECTION, SOLUTION INTRAVENOUS CONTINUOUS
Status: CANCELLED
Start: 2019-05-20

## 2019-05-16 ENCOUNTER — HOSPITAL ENCOUNTER (OUTPATIENT)
Dept: INFUSION CENTER | Facility: HOSPITAL | Age: 34
Discharge: HOME | End: 2019-05-16
Payer: COMMERCIAL

## 2019-05-16 VITALS
OXYGEN SATURATION: 100 % | DIASTOLIC BLOOD PRESSURE: 49 MMHG | SYSTOLIC BLOOD PRESSURE: 102 MMHG | TEMPERATURE: 98 F | HEART RATE: 79 BPM | RESPIRATION RATE: 16 BRPM

## 2019-05-16 DIAGNOSIS — E87.6 HYPOKALEMIA: ICD-10-CM

## 2019-05-16 LAB
ANION GAP SERPL CALCULATED.3IONS-SCNC: 7 MMOL/L (ref 4–13)
BUN SERPL-MCNC: 8 MG/DL (ref 5–25)
CALCIUM SERPL-MCNC: 8.5 MG/DL (ref 8.3–10.1)
CHLORIDE SERPL-SCNC: 108 MMOL/L (ref 100–108)
CO2 SERPL-SCNC: 24 MMOL/L (ref 21–32)
CREAT SERPL-MCNC: 0.8 MG/DL (ref 0.6–1.3)
GFR SERPL CREATININE-BSD FRML MDRD: 96 ML/MIN/1.73SQ M
GLUCOSE SERPL-MCNC: 79 MG/DL (ref 65–140)
POTASSIUM SERPL-SCNC: 3.8 MMOL/L (ref 3.5–5.3)
SODIUM SERPL-SCNC: 139 MMOL/L (ref 136–145)

## 2019-05-16 PROCEDURE — 80048 BASIC METABOLIC PNL TOTAL CA: CPT | Performed by: INTERNAL MEDICINE

## 2019-05-16 NOTE — PLAN OF CARE
Problem: Potential for Falls  Goal: Patient will remain free of falls  Description  INTERVENTIONS:  - Assess patient frequently for physical needs  -  Identify cognitive and physical deficits and behaviors that affect risk of falls    -  [place] fall precautions as indicated by assessment   - Educate patient/family on patient safety including physical limitations  - Instruct patient to call for assistance with activity based on assessment  - Modify environment to reduce risk of injury  - Consider OT/PT consult to assist with strengthening/mobility  Outcome: Progressing

## 2019-05-16 NOTE — PROGRESS NOTES
Labs drawn, Patient's lab is 3 8 we are to treat under 3 8  Patient left in stable condition    Patient refused AVS

## 2019-05-20 ENCOUNTER — HOSPITAL ENCOUNTER (OUTPATIENT)
Dept: INFUSION CENTER | Facility: HOSPITAL | Age: 34
Discharge: HOME | End: 2019-05-20

## 2019-05-20 DIAGNOSIS — E87.6 HYPOKALEMIA: ICD-10-CM

## 2019-05-22 RX ORDER — SODIUM CHLORIDE 9 MG/ML
20 INJECTION, SOLUTION INTRAVENOUS CONTINUOUS
Status: CANCELLED
Start: 2019-05-23

## 2019-05-23 ENCOUNTER — HOSPITAL ENCOUNTER (OUTPATIENT)
Dept: INFUSION CENTER | Facility: HOSPITAL | Age: 34
Discharge: HOME | End: 2019-05-23

## 2019-05-23 DIAGNOSIS — E87.6 HYPOKALEMIA: ICD-10-CM

## 2019-05-28 ENCOUNTER — HOSPITAL ENCOUNTER (OUTPATIENT)
Dept: INFUSION CENTER | Facility: HOSPITAL | Age: 34
Discharge: HOME | End: 2019-05-28
Attending: INTERNAL MEDICINE
Payer: COMMERCIAL

## 2019-05-28 VITALS
DIASTOLIC BLOOD PRESSURE: 59 MMHG | HEART RATE: 78 BPM | OXYGEN SATURATION: 99 % | RESPIRATION RATE: 18 BRPM | SYSTOLIC BLOOD PRESSURE: 105 MMHG | TEMPERATURE: 97.7 F

## 2019-05-28 DIAGNOSIS — E87.6 HYPOKALEMIA: Primary | ICD-10-CM

## 2019-05-28 LAB
ANION GAP SERPL CALCULATED.3IONS-SCNC: 11 MMOL/L (ref 4–13)
BUN SERPL-MCNC: 7 MG/DL (ref 5–25)
CALCIUM SERPL-MCNC: 8.1 MG/DL (ref 8.3–10.1)
CHLORIDE SERPL-SCNC: 108 MMOL/L (ref 100–108)
CO2 SERPL-SCNC: 21 MMOL/L (ref 21–32)
CREAT SERPL-MCNC: 0.89 MG/DL (ref 0.6–1.3)
GFR SERPL CREATININE-BSD FRML MDRD: 84 ML/MIN/1.73SQ M
GLUCOSE SERPL-MCNC: 74 MG/DL (ref 65–140)
POTASSIUM SERPL-SCNC: 3.7 MMOL/L (ref 3.5–5.3)
SODIUM SERPL-SCNC: 140 MMOL/L (ref 136–145)

## 2019-05-28 PROCEDURE — 96366 THER/PROPH/DIAG IV INF ADDON: CPT

## 2019-05-28 PROCEDURE — 96365 THER/PROPH/DIAG IV INF INIT: CPT

## 2019-05-28 PROCEDURE — 80048 BASIC METABOLIC PNL TOTAL CA: CPT | Performed by: INTERNAL MEDICINE

## 2019-05-28 RX ORDER — SODIUM CHLORIDE 9 MG/ML
20 INJECTION, SOLUTION INTRAVENOUS CONTINUOUS
Status: DISCONTINUED | OUTPATIENT
Start: 2019-05-28 | End: 2019-05-31 | Stop reason: HOSPADM

## 2019-05-28 RX ORDER — SODIUM CHLORIDE 9 MG/ML
20 INJECTION, SOLUTION INTRAVENOUS CONTINUOUS
Status: CANCELLED
Start: 2019-05-30

## 2019-05-28 RX ADMIN — POTASSIUM CHLORIDE: 149 INJECTION, SOLUTION, CONCENTRATE INTRAVENOUS at 09:17

## 2019-05-28 RX ADMIN — SODIUM CHLORIDE 20 ML/HR: 0.9 INJECTION, SOLUTION INTRAVENOUS at 09:08

## 2019-05-28 NOTE — PROGRESS NOTES
Right chest mediport accesses without difficulty  Labs drawn and sent to lab  Potassium 3 7   Pharmacy notified of same

## 2019-06-03 ENCOUNTER — HOSPITAL ENCOUNTER (OUTPATIENT)
Dept: INFUSION CENTER | Facility: HOSPITAL | Age: 34
Discharge: HOME | End: 2019-06-03

## 2019-06-06 ENCOUNTER — HOSPITAL ENCOUNTER (OUTPATIENT)
Dept: INFUSION CENTER | Facility: HOSPITAL | Age: 34
Discharge: HOME | End: 2019-06-06

## 2019-06-16 RX ORDER — SODIUM CHLORIDE 9 MG/ML
20 INJECTION, SOLUTION INTRAVENOUS CONTINUOUS
Status: CANCELLED
Start: 2019-06-20

## 2019-06-16 RX ORDER — SODIUM CHLORIDE 9 MG/ML
20 INJECTION, SOLUTION INTRAVENOUS CONTINUOUS
Status: CANCELLED
Start: 2019-06-17

## 2019-06-20 ENCOUNTER — HOSPITAL ENCOUNTER (OUTPATIENT)
Dept: INFUSION CENTER | Facility: HOSPITAL | Age: 34
Discharge: HOME | End: 2019-06-20
Attending: INTERNAL MEDICINE

## 2019-06-27 ENCOUNTER — HOSPITAL ENCOUNTER (OUTPATIENT)
Dept: INFUSION CENTER | Facility: HOSPITAL | Age: 34
Discharge: HOME | End: 2019-06-27
Payer: COMMERCIAL

## 2019-06-27 VITALS
OXYGEN SATURATION: 100 % | SYSTOLIC BLOOD PRESSURE: 121 MMHG | RESPIRATION RATE: 16 BRPM | HEART RATE: 83 BPM | TEMPERATURE: 97.6 F | DIASTOLIC BLOOD PRESSURE: 57 MMHG

## 2019-06-27 DIAGNOSIS — E87.6 HYPOKALEMIA: Primary | ICD-10-CM

## 2019-06-27 PROCEDURE — 96365 THER/PROPH/DIAG IV INF INIT: CPT

## 2019-06-27 PROCEDURE — 96366 THER/PROPH/DIAG IV INF ADDON: CPT

## 2019-06-27 RX ORDER — SODIUM CHLORIDE 9 MG/ML
20 INJECTION, SOLUTION INTRAVENOUS CONTINUOUS
Status: DISCONTINUED | OUTPATIENT
Start: 2019-06-27 | End: 2019-06-30 | Stop reason: HOSPADM

## 2019-06-27 RX ORDER — SODIUM CHLORIDE 9 MG/ML
20 INJECTION, SOLUTION INTRAVENOUS CONTINUOUS
Status: CANCELLED
Start: 2019-07-01

## 2019-06-27 RX ADMIN — POTASSIUM CHLORIDE: 149 INJECTION, SOLUTION, CONCENTRATE INTRAVENOUS at 08:50

## 2019-06-27 RX ADMIN — SODIUM CHLORIDE 20 ML/HR: 0.9 INJECTION, SOLUTION INTRAVENOUS at 08:30

## 2019-06-27 NOTE — PLAN OF CARE
Problem: INFECTION - ADULT  Goal: Absence of fever/infection during neutropenic period  Description  INTERVENTIONS:  - Monitor WBC  - Monitor for signs and symptoms of infection  Outcome: Progressing

## 2019-06-27 NOTE — PROGRESS NOTES
Pt arrived on unit at 0810  States she is tired and can feel that her potassium is dropping  Stated she has been worried about an upcoming event and has lost approx 5 to 7 pounds in the last few weeks  Potassium level from 6/24/19 is 3 4  Okay to proceed with treatment today

## 2019-07-03 RX ORDER — SODIUM CHLORIDE 9 MG/ML
20 INJECTION, SOLUTION INTRAVENOUS CONTINUOUS
Status: CANCELLED
Start: 2019-07-05

## 2019-07-05 ENCOUNTER — HOSPITAL ENCOUNTER (OUTPATIENT)
Dept: INFUSION CENTER | Facility: HOSPITAL | Age: 34
Discharge: HOME | End: 2019-07-05

## 2019-07-05 ENCOUNTER — HOSPITAL ENCOUNTER (INPATIENT)
Facility: HOSPITAL | Age: 34
LOS: 2 days | Discharge: HOME | DRG: 425 | End: 2019-07-07
Attending: EMERGENCY MEDICINE | Admitting: FAMILY MEDICINE
Payer: COMMERCIAL

## 2019-07-05 DIAGNOSIS — E87.6 HYPOKALEMIA: Primary | ICD-10-CM

## 2019-07-05 DIAGNOSIS — G47.33 OSA (OBSTRUCTIVE SLEEP APNEA): ICD-10-CM

## 2019-07-05 DIAGNOSIS — K21.9 GERD (GASTROESOPHAGEAL REFLUX DISEASE): ICD-10-CM

## 2019-07-05 DIAGNOSIS — Z98.84 HISTORY OF GASTRIC BYPASS: ICD-10-CM

## 2019-07-05 DIAGNOSIS — E87.6 ACUTE HYPOKALEMIA: ICD-10-CM

## 2019-07-05 DIAGNOSIS — E87.6 HYPOKALEMIA: ICD-10-CM

## 2019-07-05 LAB
ALBUMIN SERPL BCP-MCNC: 4.1 G/DL (ref 3.5–5)
ALP SERPL-CCNC: 50 U/L (ref 46–116)
ALT SERPL W P-5'-P-CCNC: 35 U/L (ref 12–78)
ANION GAP SERPL CALCULATED.3IONS-SCNC: 11 MMOL/L (ref 4–13)
ANION GAP SERPL CALCULATED.3IONS-SCNC: 13 MMOL/L (ref 4–13)
APTT PPP: 29 SECONDS (ref 23–37)
AST SERPL W P-5'-P-CCNC: 23 U/L (ref 5–45)
ATRIAL RATE: 82 BPM
BASOPHILS # BLD AUTO: 0.08 THOUSANDS/ΜL (ref 0–0.1)
BASOPHILS NFR BLD AUTO: 1 % (ref 0–1)
BILIRUB SERPL-MCNC: 0.5 MG/DL (ref 0.2–1)
BUN SERPL-MCNC: 12 MG/DL (ref 5–25)
BUN SERPL-MCNC: 13 MG/DL (ref 5–25)
CALCIUM SERPL-MCNC: 7.7 MG/DL (ref 8.3–10.1)
CALCIUM SERPL-MCNC: 8.6 MG/DL (ref 8.3–10.1)
CHLORIDE SERPL-SCNC: 101 MMOL/L (ref 100–108)
CHLORIDE SERPL-SCNC: 106 MMOL/L (ref 100–108)
CO2 SERPL-SCNC: 17 MMOL/L (ref 21–32)
CO2 SERPL-SCNC: 24 MMOL/L (ref 21–32)
CREAT SERPL-MCNC: 0.81 MG/DL (ref 0.6–1.3)
CREAT SERPL-MCNC: 1.12 MG/DL (ref 0.6–1.3)
CREAT UR-MCNC: 103 MG/DL
EOSINOPHIL # BLD AUTO: 0.08 THOUSAND/ΜL (ref 0–0.61)
EOSINOPHIL NFR BLD AUTO: 1 % (ref 0–6)
ERYTHROCYTE [DISTWIDTH] IN BLOOD BY AUTOMATED COUNT: 13.5 % (ref 11.6–15.1)
GFR SERPL CREATININE-BSD FRML MDRD: 64 ML/MIN/1.73SQ M
GFR SERPL CREATININE-BSD FRML MDRD: 94 ML/MIN/1.73SQ M
GLUCOSE SERPL-MCNC: 84 MG/DL (ref 65–140)
GLUCOSE SERPL-MCNC: 96 MG/DL (ref 65–140)
HCT VFR BLD AUTO: 43.4 % (ref 34.8–46.1)
HGB BLD-MCNC: 14.4 G/DL (ref 11.5–15.4)
IMM GRANULOCYTES # BLD AUTO: 0.02 THOUSAND/UL (ref 0–0.2)
IMM GRANULOCYTES NFR BLD AUTO: 0 % (ref 0–2)
INR PPP: 0.96 (ref 0.84–1.19)
LYMPHOCYTES # BLD AUTO: 1.57 THOUSANDS/ΜL (ref 0.6–4.47)
LYMPHOCYTES NFR BLD AUTO: 25 % (ref 14–44)
MAGNESIUM SERPL-MCNC: 2.4 MG/DL (ref 1.6–2.6)
MCH RBC QN AUTO: 31 PG (ref 26.8–34.3)
MCHC RBC AUTO-ENTMCNC: 33.2 G/DL (ref 31.4–37.4)
MCV RBC AUTO: 93 FL (ref 82–98)
MONOCYTES # BLD AUTO: 0.53 THOUSAND/ΜL (ref 0.17–1.22)
MONOCYTES NFR BLD AUTO: 8 % (ref 4–12)
NEUTROPHILS # BLD AUTO: 4.07 THOUSANDS/ΜL (ref 1.85–7.62)
NEUTS SEG NFR BLD AUTO: 65 % (ref 43–75)
NRBC BLD AUTO-RTO: 0 /100 WBCS
P AXIS: 63 DEGREES
PLATELET # BLD AUTO: 265 THOUSANDS/UL (ref 149–390)
PMV BLD AUTO: 10.4 FL (ref 8.9–12.7)
POTASSIUM SERPL-SCNC: 2.3 MMOL/L (ref 3.5–5.3)
POTASSIUM SERPL-SCNC: 2.7 MMOL/L (ref 3.5–5.3)
POTASSIUM UR-SCNC: 3.8 MMOL/L
PR INTERVAL: 120 MS
PROT SERPL-MCNC: 7.8 G/DL (ref 6.4–8.2)
PROTHROMBIN TIME: 12.8 SECONDS (ref 11.6–14.5)
QRS AXIS: 50 DEGREES
QRSD INTERVAL: 104 MS
QT INTERVAL: 368 MS
QTC INTERVAL: 429 MS
RBC # BLD AUTO: 4.65 MILLION/UL (ref 3.81–5.12)
SODIUM 24H UR-SCNC: 12 MOL/L
SODIUM SERPL-SCNC: 134 MMOL/L (ref 136–145)
SODIUM SERPL-SCNC: 138 MMOL/L (ref 136–145)
T WAVE AXIS: 81 DEGREES
TROPONIN I SERPL-MCNC: <0.02 NG/ML
VENTRICULAR RATE: 82 BPM
WBC # BLD AUTO: 6.35 THOUSAND/UL (ref 4.31–10.16)

## 2019-07-05 PROCEDURE — 80048 BASIC METABOLIC PNL TOTAL CA: CPT | Performed by: STUDENT IN AN ORGANIZED HEALTH CARE EDUCATION/TRAINING PROGRAM

## 2019-07-05 PROCEDURE — 99284 EMERGENCY DEPT VISIT MOD MDM: CPT

## 2019-07-05 PROCEDURE — 93005 ELECTROCARDIOGRAM TRACING: CPT

## 2019-07-05 PROCEDURE — 82570 ASSAY OF URINE CREATININE: CPT | Performed by: INTERNAL MEDICINE

## 2019-07-05 PROCEDURE — 84484 ASSAY OF TROPONIN QUANT: CPT | Performed by: EMERGENCY MEDICINE

## 2019-07-05 PROCEDURE — 84300 ASSAY OF URINE SODIUM: CPT | Performed by: INTERNAL MEDICINE

## 2019-07-05 PROCEDURE — 36415 COLL VENOUS BLD VENIPUNCTURE: CPT | Performed by: EMERGENCY MEDICINE

## 2019-07-05 PROCEDURE — 85730 THROMBOPLASTIN TIME PARTIAL: CPT | Performed by: EMERGENCY MEDICINE

## 2019-07-05 PROCEDURE — 99284 EMERGENCY DEPT VISIT MOD MDM: CPT | Performed by: EMERGENCY MEDICINE

## 2019-07-05 PROCEDURE — 83735 ASSAY OF MAGNESIUM: CPT | Performed by: EMERGENCY MEDICINE

## 2019-07-05 PROCEDURE — 85025 COMPLETE CBC W/AUTO DIFF WBC: CPT | Performed by: EMERGENCY MEDICINE

## 2019-07-05 PROCEDURE — 99222 1ST HOSP IP/OBS MODERATE 55: CPT | Performed by: FAMILY MEDICINE

## 2019-07-05 PROCEDURE — 80053 COMPREHEN METABOLIC PANEL: CPT | Performed by: EMERGENCY MEDICINE

## 2019-07-05 PROCEDURE — 93010 ELECTROCARDIOGRAM REPORT: CPT | Performed by: INTERNAL MEDICINE

## 2019-07-05 PROCEDURE — 84133 ASSAY OF URINE POTASSIUM: CPT | Performed by: INTERNAL MEDICINE

## 2019-07-05 PROCEDURE — 85610 PROTHROMBIN TIME: CPT | Performed by: EMERGENCY MEDICINE

## 2019-07-05 RX ORDER — MECLIZINE HYDROCHLORIDE 25 MG/1
25 TABLET ORAL
Status: DISCONTINUED | OUTPATIENT
Start: 2019-07-05 | End: 2019-07-07 | Stop reason: HOSPADM

## 2019-07-05 RX ORDER — POTASSIUM CHLORIDE 20 MEQ/1
40 TABLET, EXTENDED RELEASE ORAL ONCE
Status: COMPLETED | OUTPATIENT
Start: 2019-07-05 | End: 2019-07-05

## 2019-07-05 RX ORDER — POTASSIUM CHLORIDE 14.9 MG/ML
20 INJECTION INTRAVENOUS
Status: COMPLETED | OUTPATIENT
Start: 2019-07-05 | End: 2019-07-05

## 2019-07-05 RX ORDER — SODIUM CHLORIDE 9 MG/ML
75 INJECTION, SOLUTION INTRAVENOUS CONTINUOUS
Status: DISCONTINUED | OUTPATIENT
Start: 2019-07-05 | End: 2019-07-05

## 2019-07-05 RX ORDER — ACETAMINOPHEN 325 MG/1
650 TABLET ORAL EVERY 6 HOURS PRN
Status: DISCONTINUED | OUTPATIENT
Start: 2019-07-05 | End: 2019-07-07 | Stop reason: HOSPADM

## 2019-07-05 RX ORDER — ONDANSETRON 2 MG/ML
4 INJECTION INTRAMUSCULAR; INTRAVENOUS EVERY 6 HOURS PRN
Status: DISCONTINUED | OUTPATIENT
Start: 2019-07-05 | End: 2019-07-07 | Stop reason: HOSPADM

## 2019-07-05 RX ORDER — POTASSIUM CHLORIDE 14.9 MG/ML
20 INJECTION INTRAVENOUS ONCE
Status: COMPLETED | OUTPATIENT
Start: 2019-07-05 | End: 2019-07-05

## 2019-07-05 RX ORDER — SODIUM CHLORIDE AND POTASSIUM CHLORIDE .9; .15 G/100ML; G/100ML
100 SOLUTION INTRAVENOUS CONTINUOUS
Status: DISCONTINUED | OUTPATIENT
Start: 2019-07-05 | End: 2019-07-06

## 2019-07-05 RX ORDER — ALBUMIN, HUMAN INJ 5% 5 %
25 SOLUTION INTRAVENOUS ONCE
Status: COMPLETED | OUTPATIENT
Start: 2019-07-06 | End: 2019-07-06

## 2019-07-05 RX ORDER — AMILORIDE HYDROCHLORIDE 5 MG/1
5 TABLET ORAL DAILY
Status: DISCONTINUED | OUTPATIENT
Start: 2019-07-05 | End: 2019-07-05

## 2019-07-05 RX ORDER — THIAMINE MONONITRATE (VIT B1) 100 MG
100 TABLET ORAL DAILY
Status: DISCONTINUED | OUTPATIENT
Start: 2019-07-05 | End: 2019-07-07 | Stop reason: HOSPADM

## 2019-07-05 RX ORDER — AMITRIPTYLINE HYDROCHLORIDE 50 MG/1
50 TABLET, FILM COATED ORAL
Status: DISCONTINUED | OUTPATIENT
Start: 2019-07-05 | End: 2019-07-07 | Stop reason: HOSPADM

## 2019-07-05 RX ORDER — POTASSIUM CHLORIDE 20MEQ/15ML
40 LIQUID (ML) ORAL
Status: DISCONTINUED | OUTPATIENT
Start: 2019-07-05 | End: 2019-07-05

## 2019-07-05 RX ORDER — ZINC GLUCONATE 50 MG
50 TABLET ORAL DAILY
Status: DISCONTINUED | OUTPATIENT
Start: 2019-07-05 | End: 2019-07-07 | Stop reason: HOSPADM

## 2019-07-05 RX ORDER — POTASSIUM CHLORIDE 14.9 MG/ML
40 INJECTION INTRAVENOUS ONCE
Status: COMPLETED | OUTPATIENT
Start: 2019-07-05 | End: 2019-07-05

## 2019-07-05 RX ORDER — POTASSIUM CHLORIDE 20MEQ/15ML
40 LIQUID (ML) ORAL
Status: COMPLETED | OUTPATIENT
Start: 2019-07-05 | End: 2019-07-05

## 2019-07-05 RX ADMIN — POTASSIUM CHLORIDE 40 MEQ: 1500 TABLET, EXTENDED RELEASE ORAL at 13:25

## 2019-07-05 RX ADMIN — POTASSIUM CHLORIDE 40 MEQ: 20 SOLUTION ORAL at 17:35

## 2019-07-05 RX ADMIN — AMITRIPTYLINE HYDROCHLORIDE 50 MG: 50 TABLET, FILM COATED ORAL at 21:23

## 2019-07-05 RX ADMIN — POTASSIUM CHLORIDE 40 MEQ: 20 SOLUTION ORAL at 13:23

## 2019-07-05 RX ADMIN — POTASSIUM CHLORIDE 40 MEQ: 1500 TABLET, EXTENDED RELEASE ORAL at 10:23

## 2019-07-05 RX ADMIN — AMILORIDE HYDROCLORIDE 5 MG: 5 TABLET ORAL at 13:55

## 2019-07-05 RX ADMIN — POTASSIUM CHLORIDE 40 MEQ: 20 SOLUTION ORAL at 21:22

## 2019-07-05 RX ADMIN — SODIUM CHLORIDE AND POTASSIUM CHLORIDE 100 ML/HR: .9; .15 SOLUTION INTRAVENOUS at 13:25

## 2019-07-05 RX ADMIN — Medication 50 MG: at 13:55

## 2019-07-05 RX ADMIN — POTASSIUM CHLORIDE 20 MEQ: 200 INJECTION, SOLUTION INTRAVENOUS at 18:30

## 2019-07-05 RX ADMIN — MECLIZINE HYDROCHLORIDE 25 MG: 25 TABLET ORAL at 21:23

## 2019-07-05 RX ADMIN — Medication 100 MG: at 13:26

## 2019-07-05 RX ADMIN — POTASSIUM CHLORIDE 20 MEQ: 200 INJECTION, SOLUTION INTRAVENOUS at 13:25

## 2019-07-05 RX ADMIN — POTASSIUM CHLORIDE 20 MEQ: 200 INJECTION, SOLUTION INTRAVENOUS at 10:23

## 2019-07-05 RX ADMIN — POTASSIUM CHLORIDE 40 MEQ: 20 SOLUTION ORAL at 19:06

## 2019-07-05 RX ADMIN — POTASSIUM CHLORIDE 20 MEQ: 200 INJECTION, SOLUTION INTRAVENOUS at 21:23

## 2019-07-05 RX ADMIN — SODIUM CHLORIDE 75 ML/HR: 0.9 INJECTION, SOLUTION INTRAVENOUS at 10:23

## 2019-07-05 RX ADMIN — SODIUM CHLORIDE 1000 ML: 0.9 INJECTION, SOLUTION INTRAVENOUS at 09:44

## 2019-07-05 RX ADMIN — POTASSIUM CHLORIDE 40 MEQ: 20 SOLUTION ORAL at 23:08

## 2019-07-05 RX ADMIN — SODIUM CHLORIDE AND POTASSIUM CHLORIDE 100 ML/HR: .9; .15 SOLUTION INTRAVENOUS at 23:10

## 2019-07-05 NOTE — CONSULTS
Consultation - Nephrology   Blayne Hayes 27 y o  female MRN: [id number]  Unit/Bed#: 405-01 Encounter: [id number]      A/P:  1  Hypokalemia   Agree with aggressive IV supplementation, I will transition her back to her typical outpatient regimen to potassium supplementation with 40 mEq of potassium chloride solution 5 times a day  Continue monitor patient's potassium level very closely  Magnesium level is appropriate  Follow-up potassium little bit later this evening  Will add a urine potassium to the patient's urine studies  With respect to etiology, remains unclear as to how or why the patient has become more hypokalemic after she was very stable for approximately 4-5 weeks  We will continue to question patient and find any specific etiology that we can find  2  Acute renal failure   Agree with volume expansion, as mentioned in the history of present illness, patient denies any nausea vomiting diarrhea  She denies any use of nonsteroidal anti-inflammatory medications or any other new medications status oral supplements  Check urine sodium and creatinine  Continue IV fluids at this time  3  History of hypophosphatemia   Phosphorus level is appropriate, continue to monitor from time to time  4  History of gastric bypass       Thank you for allowing us to participate in the care of your patient  Please feel free to contact us regarding the care of this patient, or any other questions/concerns that may be applicable      Patient Active Problem List   Diagnosis    Hypokalemia    History of gastric bypass    Migraine without aura and without status migrainosus, not intractable    Left-sided weakness    Hypophosphatemia    Anemia    Vitamin D deficiency    Weakness of both lower extremities    Weakness of both upper extremities    Elevated CPK    Vitamin B12 deficiency    Cervical lymphadenopathy    Generalized weakness    Paresthesia of both lower extremities    Paresthesias    B12 deficiency    Gastric bypass status for obesity    GERD (gastroesophageal reflux disease)    Migraine    WAGNER (obstructive sleep apnea)    Other intestinal malabsorption    Acute kidney injury ([address] )    Right ovarian cyst    Chest pain       History of Present Illness   Physician Requesting Consult: Blas Pruitt MD  Reason for Consult / Principal Problem:  Hypokalemia  Hx and PE limited by:   HPI: Sylvester Ross is a 27y o  year old female who presented to the hospital earlier this morning for a potassium infusion, however, upon presenting she looked and felt unwell and she was directed down to the emergency department  There a repeat potassium showed that it had decreased down to 2 3 millimoles/L  This is slightly worse then a few days ago when he was at 2 7 millimole/L  Recently the patient has been doing very well maintain her potassium levels and has not had a potassium infusion for a few weeks  Patient denies any changes to her overall health with the exception of a few episodes of vomiting which had occurred about 2 weeks ago  Since then she claims to be feeling well and eating and drinking appropriately with now nausea vomiting or diarrhea  At presentation, the patient was also noted to have an increased creatinine up to 1 12 mg/dL, this is above her typical baseline creatinine which appears to be between 0 8-0 9 mg/dL  Patient claims to be taking all of potassium supplementation and had actually increased her frequency from 4 times day of to 5 times a day after her potassium was noted to be decreased down to the 2 7 millimole/L  History obtained from chart review and the patient    Review of Systems - Negative except as mentioned above in HPI, more specifics as mentioned below    Review of Systems - General ROS: positive for  - fatigue  Psychological ROS: negative  Ophthalmic ROS: negative  ENT ROS: negative  Allergy and Immunology ROS: negative  Hematological and Lymphatic ROS: negative  Endocrine ROS: negative  Respiratory ROS: no cough, shortness of breath, or wheezing  Cardiovascular ROS: no chest pain or dyspnea on exertion  Gastrointestinal ROS: no abdominal pain, change in bowel habits, or black or bloody stools  Genito-Urinary ROS: no dysuria, trouble voiding, or hematuria  Musculoskeletal ROS: positive for - muscular weakness  Neurological ROS: positive for - weakness  Dermatological ROS: negative    Historical Information   Past Medical History:   Diagnosis Date    H  pylori infection     Hypokalemia     Migraine      Past Surgical History:   Procedure Laterality Date    ABDOMINAL SURGERY      APPENDECTOMY      CHOLECYSTECTOMY      COSMETIC SURGERY      FL GUIDED CENTRAL VENOUS ACCESS DEVICE INSERTION  12/21/2018    GASTRIC BYPASS      HYSTERECTOMY      TUNNELED VENOUS PORT PLACEMENT N/A 12/21/2018    Procedure: INSERTION VENOUS PORT (PORT-A-CATH);   Surgeon: Meredith Kaiser DO;  Location: [facility];  Service: General     Social History   Social History     Substance and Sexual Activity   Alcohol Use Yes    Alcohol/week: 0 0 standard drinks    Frequency: Monthly or less    Drinks per session: 1 or 2    Binge frequency: Never     Social History     Substance and Sexual Activity   Drug Use No     Social History     Tobacco Use   Smoking Status Never Smoker   Smokeless Tobacco Never Used     Family History   Problem Relation Age of Onset    Hypertension Father     Diabetes Father     Diabetes Maternal Grandfather        Meds/Allergies   all current active meds have been reviewed, current meds:   Current Facility-Administered Medications   Medication Dose Route Frequency    acetaminophen (TYLENOL) tablet 650 mg  650 mg Oral Q6H PRN    AMILoride tablet 5 mg  5 mg Oral Daily    amitriptyline (ELAVIL) tablet 50 mg  50 mg Oral HS    meclizine (ANTIVERT) tablet 25 mg  25 mg Oral HS    ondansetron (ZOFRAN) injection 4 mg  4 mg Intravenous Q6H PRN    potassium chloride (K-DUR,KLOR-CON) CR tablet 40 mEq  40 mEq Oral Once    potassium chloride 20 mEq IVPB (premix)  40 mEq Intravenous Once    potassium chloride 20 mEq IVPB (premix)  20 mEq Intravenous Once    sodium chloride 0 9 % with KCl 20 mEq/L infusion (premix)  100 mL/hr Intravenous Continuous    thiamine (VITAMIN B1) tablet 100 mg  100 mg Oral Daily    zinc gluconate tablet 50 mg  50 mg Oral Daily    and PTA meds:    Medications Prior to Admission   Medication    AMILoride 5 mg tablet    amitriptyline (ELAVIL) 50 mg tablet    b complex-C-folic acid (NEPHRO-EDIE) 0 8 mg tablet    ergocalciferol (ERGOCALCIFEROL) 38881 units capsule    ferrous sulfate 325 (65 Fe) mg tablet    meclizine (ANTIVERT) 25 mg tablet    potassium chloride 10 % oral solution    thiamine 100 MG tablet    zinc gluconate 50 mg tablet         Allergies   Allergen Reactions    Nsaids      Other reaction(s): Other (Please comment)  Should not take s/p bariatric surgery       Objective     Intake/Output Summary (Last 24 hours) at 7/5/2019 1306  Last data filed at 7/5/2019 1241  Gross per 24 hour   Intake 1303 75 ml   Output    Net 1303 75 ml       Invasive Devices:        Physical Exam      No intake/output data recorded  Vitals:    07/05/19 1047   BP: 121/72   Pulse: 75   Resp: 18   Temp: (!) 96 8 °F (36 °C)   SpO2: 99%       Gen: in NAD, Alert/Awake  HEENT: no sclerous icterus, MMM, neck supple  CV: +S1/S2, RRR  Lungs: CTA bilaterally  Abd: +BS, soft NT/ND  Ext: all four extremities are warm  Skin: no rashes noted  Neuro: CN II-XII intact    Current Weight: Weight - Scale: 71 3 kg (157 lb 3 oz)  First Weight: Weight - Scale: 74 3 kg (163 lb 12 8 oz)(Has boot on right foot)    Lab Results:  I have personally reviewed pertinent labs      CBC:   Lab Results   Component Value Date    WBC 6 35 07/05/2019    HGB 14 4 07/05/2019    HCT 43 4 07/05/2019    MCV 93 07/05/2019     07/05/2019    MCH 31 0 07/05/2019    MCHC 33 2 07/05/2019    RDW 13 5 07/05/2019    MPV 10 4 07/05/2019    NRBC 0 07/05/2019     CMP:   Lab Results   Component Value Date    K 2 3 (LL) 07/05/2019     07/05/2019    CO2 24 07/05/2019    BUN 12 07/05/2019    CREATININE 1 12 07/05/2019    CALCIUM 8 6 07/05/2019    AST 23 07/05/2019    ALT 35 07/05/2019    ALKPHOS 50 07/05/2019    EGFR 64 07/05/2019     Phosphorus: No results found for: PHOS  Magnesium:   Lab Results   Component Value Date    MG 2 4 07/05/2019     Urinalysis: No results found for: Marthann End, SPECGRAV, PHUR, LEUKOCYTESUR, NITRITE, PROTEINUA, GLUCOSEU, KETONESU, BILIRUBINUR, BLOODU  Ionized Calcium: No results found for: CAION  Coagulation:   Lab Results   Component Value Date    INR 0 96 07/05/2019     Troponin:   Lab Results   Component Value Date    TROPONINI <0 02 07/05/2019     ABG: No results found for: PHART, JMN8XJB, PO2ART, EZR5TJV, S6BZFZCT, BEART, SOURCE    Results from last 7 days   Lab Units 07/05/19  0939   POTASSIUM mmol/L 2 3*   CHLORIDE mmol/L 101   CO2 mmol/L 24   BUN mg/dL 12   CREATININE mg/dL 1 12   CALCIUM mg/dL 8 6   ALK PHOS U/L 50   ALT U/L 35   AST U/L 23       Radiology review:  No results found        Fabrizio Yost DO

## 2019-07-05 NOTE — ED PROCEDURE NOTE
PROCEDURE  ECG 12 Lead Documentation Only  Date/Time: 7/5/2019 8:43 AM  Performed by: Louann Zhao DO  Authorized by: Louann Zhao DO     Indications / Diagnosis:  Hypokalemia   ECG reviewed by me, the ED Provider: yes    Patient location:  ED  Previous ECG:     Previous ECG comparison: When compared to EKG from April 22, 2019 no significant changes were found    Interpretation:     Interpretation: normal    Rate:     ECG rate:  82    ECG rate assessment: normal    Rhythm:     Rhythm: sinus rhythm    ST segments:     ST segments:  Non-specific         Louann Zhao DO  07/05/19 5578

## 2019-07-05 NOTE — ED PROVIDER NOTES
History  Chief Complaint   Patient presents with    Abnormal Lab     Patient had blood work done on Monday, potassium is low again  Was doing well prior to 6/19 and then had an episode of illness  50-year-old female presents for potassium  Patient states that she had her labs drawn on Monday of this week and got the result Tuesday which demonstrated potassium 2 7  Patient has a history of frequent hypokalemia  She states she was relatively asymptomatic for last several days  She did go to the infusion Center today to have her infusion of potassium and was told to come to the emergency department  Patient currently denies any symptoms  History provided by:  Patient  Evaluation of Abnormal Diagnostic Test   Time since result:  Patient states that on Monday of this week she had labs drawn and the results were obtained on 07/02 which demonstrated a potassium of 2 7  Patient referred by:  ED personnel  Resulting agency:  External  Result type: chemistry    Chemistry:     Potassium:  Low      Prior to Admission Medications   Prescriptions Last Dose Informant Patient Reported? Taking?    AMILoride 5 mg tablet  Self No No   Sig: Take 1 tablet (5 mg total) by mouth daily   amitriptyline (ELAVIL) 50 mg tablet   Yes No   Sig: Take 50 mg by mouth daily at bedtime   b complex-C-folic acid (NEPHRO-EDIE) 0 8 mg tablet   Yes No   Sig: Take 0 8 mg by mouth daily with dinner   ergocalciferol (ERGOCALCIFEROL) 84426 units capsule   Yes No   Sig: Take 50,000 Units by mouth once a week Patient takes this med on Mondays    ferrous sulfate 325 (65 Fe) mg tablet   Yes No   Sig: Take 325 mg by mouth every other day    meclizine (ANTIVERT) 25 mg tablet   Yes No   Sig: Take 25 mg by mouth daily at bedtime   potassium chloride 10 % oral solution  Self No No   Sig: Take 60 mL (80 mEq total) by mouth 6 (six) times a day for 30 days   Patient taking differently: Take 60 mEq by mouth 4 (four) times a day    thiamine 100 MG tablet Yes No   Sig: Take 100 mg by mouth daily   zinc gluconate 50 mg tablet   Yes No   Sig: Take 50 mg by mouth daily      Facility-Administered Medications: None       Past Medical History:   Diagnosis Date    H  pylori infection     Hypokalemia     Migraine        Past Surgical History:   Procedure Laterality Date    ABDOMINAL SURGERY      APPENDECTOMY      CHOLECYSTECTOMY      FL GUIDED CENTRAL VENOUS ACCESS DEVICE INSERTION  12/21/2018    GASTRIC BYPASS      HYSTERECTOMY      TUNNELED VENOUS PORT PLACEMENT N/A 12/21/2018    Procedure: INSERTION VENOUS PORT (PORT-A-CATH); Surgeon: Bettie Rodriguez DO;  Location: [facility];  Service: General       Family History   Problem Relation Age of Onset    Hypertension Father     Diabetes Father     Diabetes Maternal Grandfather      I have reviewed and agree with the history as documented  Social History     Tobacco Use    Smoking status: Never Smoker    Smokeless tobacco: Never Used   Substance Use Topics    Alcohol use: Never     Alcohol/week: 0 0 standard drinks     Frequency: Never     Drinks per session: Patient refused     Binge frequency: Never    Drug use: No        Review of Systems   Constitutional: Negative  HENT: Negative  Eyes: Negative  Respiratory: Negative  Cardiovascular: Negative  Gastrointestinal: Negative  Endocrine: Negative  Genitourinary: Negative  Musculoskeletal: Negative  Skin: Negative  Allergic/Immunologic: Negative  Neurological: Negative  Hematological: Negative  Psychiatric/Behavioral: Negative  All other systems reviewed and are negative  Physical Exam  Physical Exam   Constitutional: She appears well-developed and well-nourished  HENT:   Head: Normocephalic  Right Ear: External ear normal    Left Ear: External ear normal    Eyes: Pupils are equal, round, and reactive to light  Right eye exhibits no discharge  Left eye exhibits no discharge  Neck: Normal range of motion   No tracheal deviation present  No thyromegaly present  Cardiovascular: Normal rate, regular rhythm and normal heart sounds  Pulmonary/Chest: Effort normal    Abdominal: Soft  She exhibits no distension  There is no tenderness  There is no guarding  Musculoskeletal: Normal range of motion  She exhibits no edema or deformity  Neurological: She is alert  She displays normal reflexes  No cranial nerve deficit  She exhibits normal muscle tone  Coordination normal    Skin: Skin is warm  Capillary refill takes less than 2 seconds  No rash noted  No erythema  Psychiatric: She has a normal mood and affect  Her behavior is normal    Vitals reviewed        Vital Signs  ED Triage Vitals [07/05/19 0825]   Temperature Pulse Respirations Blood Pressure SpO2   98 °F (36 7 °C) 92 17 141/64 100 %      Temp Source Heart Rate Source Patient Position - Orthostatic VS BP Location FiO2 (%)   Temporal Monitor -- Left arm --      Pain Score       No Pain           Vitals:    07/05/19 0825   BP: 141/64   Pulse: 92         Visual Acuity      ED Medications  Medications   sodium chloride 0 9 % bolus 1,000 mL (1,000 mL Intravenous New Bag 7/5/19 0944)   potassium chloride (K-DUR,KLOR-CON) CR tablet 40 mEq (has no administration in time range)   potassium chloride 20 mEq IVPB (premix) (has no administration in time range)       Diagnostic Studies  Results Reviewed     Procedure Component Value Units Date/Time    Troponin I [[id number]]  (Normal) Collected:  07/05/19 0939    Lab Status:  Final result Specimen:  Blood from Arm, Left Updated:  07/05/19 1004     Troponin I <0 02 ng/mL     Comprehensive metabolic panel [[id number]]  (Abnormal) Collected:  07/05/19 0939    Lab Status:  Final result Specimen:  Blood from Arm, Left Updated:  07/05/19 1003     Sodium 138 mmol/L      Potassium 2 3 mmol/L      Chloride 101 mmol/L      CO2 24 mmol/L      ANION GAP 13 mmol/L      BUN 12 mg/dL      Creatinine 1 12 mg/dL      Glucose 84 mg/dL      Calcium 8 6 mg/dL      AST 23 U/L      ALT 35 U/L      Alkaline Phosphatase 50 U/L      Total Protein 7 8 g/dL      Albumin 4 1 g/dL      Total Bilirubin 0 50 mg/dL      eGFR 64 ml/min/1 73sq m     Narrative:       [place] guidelines for Chronic Kidney Disease (CKD):     Stage 1 with normal or high GFR (GFR > 90 mL/min/1 73 square meters)    Stage 2 Mild CKD (GFR = 60-89 mL/min/1 73 square meters)    Stage 3A Moderate CKD (GFR = 45-59 mL/min/1 73 square meters)    Stage 3B Moderate CKD (GFR = 30-44 mL/min/1 73 square meters)    Stage 4 Severe CKD (GFR = 15-29 mL/min/1 73 square meters)    Stage 5 End Stage CKD (GFR <15 mL/min/1 73 square meters)  Note: GFR calculation is accurate only with a steady state creatinine    Protime-INR [[id number]]  (Normal) Collected:  07/05/19 0938    Lab Status:  Final result Specimen:  Blood from Arm, Left Updated:  07/05/19 0956     Protime 12 8 seconds      INR 0 96    APTT [[id number]]  (Normal) Collected:  07/05/19 0938    Lab Status:  Final result Specimen:  Blood from Arm, Left Updated:  07/05/19 0956     PTT 29 seconds     Magnesium [[id number]]  (Normal) Collected:  07/05/19 0940    Lab Status:  Final result Specimen:  Blood from Arm, Left Updated:  07/05/19 0955     Magnesium 2 4 mg/dL     CBC and differential [[id number]] Collected:  07/05/19 0939    Lab Status:  Final result Specimen:  Blood from Arm, Left Updated:  07/05/19 0947     WBC 6 35 Thousand/uL      RBC 4 65 Million/uL      Hemoglobin 14 4 g/dL      Hematocrit 43 4 %      MCV 93 fL      MCH 31 0 pg      MCHC 33 2 g/dL      RDW 13 5 %      MPV 10 4 fL      Platelets 945 Thousands/uL      nRBC 0 /100 WBCs      Neutrophils Relative 65 %      Immat GRANS % 0 %      Lymphocytes Relative 25 %      Monocytes Relative 8 %      Eosinophils Relative 1 %      Basophils Relative 1 %      Neutrophils Absolute 4 07 Thousands/µL      Immature Grans Absolute 0 02 Thousand/uL      Lymphocytes Absolute 1 57 Thousands/µL      Monocytes Absolute 0 53 Thousand/µL      Eosinophils Absolute 0 08 Thousand/µL      Basophils Absolute 0 08 Thousands/µL                  No orders to display              Procedures  Procedures       ED Course         HEART Risk Score      Most Recent Value   History  1 Filed at: 07/05/2019 0843   ECG  1 Filed at: 07/05/2019 0843   Age  0 Filed at: 07/05/2019 0843   Risk Factors  1 Filed at: 07/05/2019 0843   Troponin  0 Filed at: 07/05/2019 0843   Heart Score Risk Calculator   History  1 Filed at: 07/05/2019 0843   ECG  1 Filed at: 07/05/2019 0843   Age  0 Filed at: 07/05/2019 0843   Risk Factors  1 Filed at: 07/05/2019 0843   Troponin  0 Filed at: 07/05/2019 0843   HEART Score  3 Filed at: 07/05/2019 0843   HEART Score  3 Filed at: 07/05/2019 3229                            MDM  Number of Diagnoses or Management Options  Diagnosis management comments: 16-year-old female longstanding history of hypokalemia presents for evaluation for hypokalemia  Differential diagnosis 1  Hypokalemia 2  Hypomagnesemia 3  Other electrolyte abnormality           Amount and/or Complexity of Data Reviewed  Clinical lab tests: ordered and reviewed  Tests in the radiology section of CPT®: ordered and reviewed  Tests in the medicine section of CPT®: reviewed and ordered    Risk of Complications, Morbidity, and/or Mortality  Presenting problems: high  Diagnostic procedures: high  Management options: high        Disposition  Final diagnoses:   Hypokalemia   History of gastric bypass   WAGNER (obstructive sleep apnea)   GERD (gastroesophageal reflux disease)     Time reflects when diagnosis was documented in both MDM as applicable and the Disposition within this note     Time User Action Codes Description Comment    7/5/2019 10:08 AM Soo Sainziling Add [E87 6] Hypokalemia     7/5/2019 10:09 AM Perezana Railing Add [Z98 84] History of gastric bypass     7/5/2019 10:09 AM Soo Railing Add [G47 33] WAGNER (obstructive sleep apnea)     7/5/2019 10:09 AM Wai Bhatti [K21 9] GERD (gastroesophageal reflux disease)       ED Disposition     ED Disposition Condition Date/Time Comment    Admit Stable Fri Jul 5, 2019 10:09 AM         Follow-up Information    None         Patient's Medications   Discharge Prescriptions    No medications on file     No discharge procedures on file      ED Provider  Electronically Signed by           Charlie Greenfield DO  07/05/19 1002

## 2019-07-05 NOTE — SOCIAL WORK
Met with pt to discuss role as  in helping pt to develop discharge plan and to help pt carry out their plan  Pt lives in a ranch house with her   Pt has 0 CARLOS  Pt has no DME at home  Pt is independent with ADL's  Pt and her  both drive  Pt gets labs 2 days a week at [facility]   Pt goes to the [email or web address] infusion center  twice a week as needed for potassium infusions but she has not required an infusion in a month  Pt said she has lost 5-7 pounds over last week to ten days  Pt's PCP is Dr Toni Zamora  Pt uses [facility] in [facility]  Pt was given a discharge check list and Meds to [facility]  Both reviewed with a good understanding  Will continue to follow for any Case management needs

## 2019-07-05 NOTE — PLAN OF CARE
Problem: INFECTION - ADULT  Goal: Absence or prevention of progression during hospitalization  Description  INTERVENTIONS:  - Assess and monitor for signs and symptoms of infection  - Monitor lab/diagnostic results  - Monitor all insertion sites, i e  indwelling lines, tubes, and drains  - Administer medications as ordered  - Instruct and encourage patient and family to use good hand hygiene technique  - Identify and instruct in appropriate isolation precautions for identified infection/condition   Outcome: Progressing     Problem: SAFETY ADULT  Goal: Patient will remain free of falls  Description  INTERVENTIONS:  - Assess patient frequently for physical needs  -  Identify cognitive and physical deficits and behaviors that affect risk of falls    -  [place] fall precautions as indicated by assessment   - Educate patient/family on patient safety including physical limitations  - Instruct patient to call for assistance with activity based on assessment  - Modify environment to reduce risk of injury  - Consider OT/PT consult to assist with strengthening/mobility  Outcome: Progressing  Goal: Maintain or return to baseline ADL function  Description  INTERVENTIONS:  -  Assess patient's ability to carry out ADLs; assess patient's baseline for ADL function and identify physical deficits which impact ability to perform ADLs (bathing, care of mouth/teeth, toileting, grooming, dressing, etc )  - Assess/evaluate cause of self-care deficits   - Assess range of motion  - Assess patient's mobility; develop plan if impaired  - Assess patient's need for assistive devices and provide as appropriate  - Encourage maximum independence but intervene and supervise when necessary  ¯ Involve family in performance of ADLs  ¯ Assess for home care needs following discharge   ¯ Request OT consult to assist with ADL evaluation and planning for discharge  ¯ Provide patient education as appropriate  Outcome: Progressing  Goal: Maintain or return mobility status to optimal level  Description  INTERVENTIONS:  - Assess patient's baseline mobility status (ambulation, transfers, stairs, etc )    - Identify cognitive and physical deficits and behaviors that affect mobility  - Identify mobility aids required to assist with transfers and/or ambulation (gait belt, sit-to-stand, lift, walker, cane, etc )  - [place] fall precautions as indicated by assessment  - Record patient progress and toleration of activity level on Mobility SBAR; progress patient to next Phase/Stage  - Instruct patient to call for assistance with activity based on assessment  - Request Rehabilitation consult to assist with strengthening/weightbearing, etc   Outcome: Progressing     Problem: DISCHARGE PLANNING  Goal: Discharge to home or other facility with appropriate resources  Description  INTERVENTIONS:  - Identify barriers to discharge w/patient and caregiver  - Arrange for needed discharge resources and transportation as appropriate  - Identify discharge learning needs (meds, wound care, etc   - Refer to Case Management Department for coordinating discharge planning if the patient needs post-hospital services based on physician/advanced practitioner order or complex needs related to functional status, cognitive ability, or social support system   Outcome: Progressing     Problem: Knowledge Deficit  Goal: Patient/family/caregiver demonstrates understanding of disease process, treatment plan, medications, and discharge instructions  Description  Complete learning assessment and assess knowledge base    Interventions:  - Provide teaching at level of understanding  - Provide teaching via preferred learning methods  Outcome: Progressing     Problem: METABOLIC, FLUID AND ELECTROLYTES - ADULT  Goal: Electrolytes maintained within normal limits  Description  INTERVENTIONS:  - Monitor labs and assess patient for signs and symptoms of electrolyte imbalances  - Administer electrolyte replacement as ordered  - Monitor response to electrolyte replacements, including repeat lab results as appropriate  - Instruct patient on fluid and nutrition as appropriate  Outcome: Progressing  Goal: Fluid balance maintained  Description  INTERVENTIONS:  - Monitor labs and assess for signs and symptoms of volume excess or deficit  - Instruct patient on fluid and nutrition as appropriate   Outcome: Progressing

## 2019-07-05 NOTE — H&P
H&P Exam - Karlee Whelan 27 y o  female MRN: [id number]    Unit/Bed#: 405-01 Encounter: [id number]    Assessment:  Marshal Cai is a 28 y/o female with a PMH of hypokalemia, gastric bypass surgery done October 1, 2014 is admitted to the general med/surg floor  for observation for hypokalemia and potassium infusion  She is currently afebrile, hemodynamically stable and in no acute distress  Case discussed with Dr Garcia    Plan:  * Hypokalemia  Assessment & Plan  -Pt has a hx of hypokalemia since undergoing [facility] and Y gastric bypass surgery on October 1, 2014  -Pt had been receiving IV potassium infusion twice weekly that was decreased to once weekly and then for the past month has not been receiving any K infusions  She had been taking supplemental potassium orally at home  -patient requires labs to be drawn every Monday and every Thursday she gets infusions  -Pt was found to have low potassium in her weekly labs, she was unable to get her infusion on the 4th as it was closed  -K on admission was 2 3  -repleting with a goal of K>4 0  -24 hour telemetry  -Troponin <0 02  -EKG  -repeat BM  -continue to monitor overnight  -IVF with NS & KCL    History of gastric bypass  Assessment & Plan  -PT has a history of gastric bypass surgery done on 10/1/2014  -weight ranges between 160-150  -when below 160 symptoms and hypokalemia begin  -avoid NSAID's because of Gastric Bypass hx      History of Present Illness   Marshal Cai is a 28 y/o female with a PMH of hypokalemia, gastric bypass surgery done October 1, 2014 presents to the ED because of low potassium levels  She has a history of having hypokalemia due to her gastric bypass surgery  She had gone to [facility] had been seen by a nephrology and in April 2019 she had a 17 day course at [facility] to find a cause of her continued hypokalemia  It is thought to be due to significant weight loss    She was originally started on potassium infusions twice a week with weekly lab draws   She was then advised to gain weight where she weighed between 160 and 165  During that time her potassium levels were well enough that she no longer needed weekly infusions  She states that on Monday for her weekly lab draws her potassium was found to be low  She was due to have potassium infusion done on Thursday  However, the infusion center was closed due to the holiday  Over the course of the day, yesterday she felt fatigued, spasms in her fingers, numbness and feels like her body is vibrating which are consistent signs to her that she has very low potassium levels  She also complains of chest pressure/heaviness but no chest pain  She denies shortness of breath headaches and dizziness  ED course:  Vitals: WNL  Labs: Potassium was 2 3, Mg was WNL, Toponin <0 02, EKG  Medications: IVF NS, KCL    Review of Systems   Constitutional: Positive for activity change, appetite change and fatigue  Negative for diaphoresis and fever  HENT: Negative for congestion, ear discharge, ear pain, nosebleeds, postnasal drip, rhinorrhea, sinus pressure, sinus pain, sneezing and sore throat  Eyes: Negative  Respiratory: Positive for chest tightness  Negative for cough, shortness of breath and wheezing  Cardiovascular: Negative for palpitations and leg swelling  Chest pressure   Gastrointestinal: Negative for abdominal distention, abdominal pain, anal bleeding, blood in stool, diarrhea, nausea and vomiting  Genitourinary: Negative for difficulty urinating, dyspareunia, dysuria, enuresis and hematuria  Musculoskeletal: Positive for myalgias  Negative for arthralgias and back pain  Neurological: Positive for weakness and numbness  Negative for dizziness and headaches         Historical Information   Past Medical History:   Diagnosis Date    H  pylori infection     Hypokalemia     Migraine      Past Surgical History:   Procedure Laterality Date    ABDOMINAL SURGERY      APPENDECTOMY      CHOLECYSTECTOMY      COSMETIC SURGERY      FL GUIDED CENTRAL VENOUS ACCESS DEVICE INSERTION  12/21/2018    GASTRIC BYPASS      HYSTERECTOMY      TUNNELED VENOUS PORT PLACEMENT N/A 12/21/2018    Procedure: INSERTION VENOUS PORT (PORT-A-CATH); Surgeon: Tonya Hanley DO;  Location: [facility];  Service: General     Social History   Social History     Substance and Sexual Activity   Alcohol Use Yes    Alcohol/week: 0 0 standard drinks    Frequency: Monthly or less    Drinks per session: 1 or 2    Binge frequency: Never     Social History     Substance and Sexual Activity   Drug Use No     Social History     Tobacco Use   Smoking Status Never Smoker   Smokeless Tobacco Never Used     Meds/Allergies     Allergies   Allergen Reactions    Nsaids      Other reaction(s):  Other (Please comment)  Should not take s/p bariatric surgery       Objective   First Vitals:   Blood Pressure: 141/64 (07/05/19 0825)  Pulse: 92 (07/05/19 0825)  Temperature: 98 °F (36 7 °C) (07/05/19 0825)  Temp Source: Temporal (07/05/19 0825)  Respirations: 17 (07/05/19 0825)  Height: 5' 3" (160 cm) (07/05/19 1047)  Weight - Scale: 74 3 kg (163 lb 12 8 oz)(Has boot on right foot) (07/05/19 0825)  SpO2: 100 % (07/05/19 0825)    Current Vitals:   Blood Pressure: 111/56 (07/05/19 1503)  Pulse: 78 (07/05/19 1503)  Temperature: 97 5 °F (36 4 °C) (07/05/19 1503)  Temp Source: Temporal (07/05/19 1503)  Respirations: 18 (07/05/19 1503)  Height: 5' 3" (160 cm) (07/05/19 1047)  Weight - Scale: 71 3 kg (157 lb 3 oz) (07/05/19 1047)  SpO2: 100 % (07/05/19 1503)      Intake/Output Summary (Last 24 hours) at 7/5/2019 1539  Last data filed at 7/5/2019 1327  Gross per 24 hour   Intake 1483 75 ml   Output 200 ml   Net 1283 75 ml       Invasive Devices     Central Venous Catheter Line            Port A Cath 12/22/18 Right Chest 195 days          Peripheral Intravenous Line            Peripheral IV 07/05/19 Right Forearm less than 1 day                Physical Exam   Constitutional: She is oriented to person, place, and time  She appears well-developed and well-nourished  No distress  Cardiovascular: Normal rate, regular rhythm, normal heart sounds and intact distal pulses  Pulmonary/Chest: Effort normal and breath sounds normal    Abdominal: Soft  Bowel sounds are normal    Musculoskeletal: She exhibits no edema, tenderness or deformity  Neurological: She is alert and oriented to person, place, and time  She displays normal reflexes  No cranial nerve deficit or sensory deficit  She exhibits normal muscle tone  Coordination normal    Nursing note and vitals reviewed        Lab Results:   Results for orders placed or performed during the hospital encounter of 07/05/19   CBC and differential   Result Value Ref Range    WBC 6 35 4 31 - 10 16 Thousand/uL    RBC 4 65 3 81 - 5 12 Million/uL    Hemoglobin 14 4 11 5 - 15 4 g/dL    Hematocrit 43 4 34 8 - 46 1 %    MCV 93 82 - 98 fL    MCH 31 0 26 8 - 34 3 pg    MCHC 33 2 31 4 - 37 4 g/dL    RDW 13 5 11 6 - 15 1 %    MPV 10 4 8 9 - 12 7 fL    Platelets 303 770 - 758 Thousands/uL    nRBC 0 /100 WBCs    Neutrophils Relative 65 43 - 75 %    Immat GRANS % 0 0 - 2 %    Lymphocytes Relative 25 14 - 44 %    Monocytes Relative 8 4 - 12 %    Eosinophils Relative 1 0 - 6 %    Basophils Relative 1 0 - 1 %    Neutrophils Absolute 4 07 1 85 - 7 62 Thousands/µL    Immature Grans Absolute 0 02 0 00 - 0 20 Thousand/uL    Lymphocytes Absolute 1 57 0 60 - 4 47 Thousands/µL    Monocytes Absolute 0 53 0 17 - 1 22 Thousand/µL    Eosinophils Absolute 0 08 0 00 - 0 61 Thousand/µL    Basophils Absolute 0 08 0 00 - 0 10 Thousands/µL   Protime-INR   Result Value Ref Range    Protime 12 8 11 6 - 14 5 seconds    INR 0 96 0 84 - 1 19   APTT   Result Value Ref Range    PTT 29 23 - 37 seconds   Comprehensive metabolic panel   Result Value Ref Range    Sodium 138 136 - 145 mmol/L    Potassium 2 3 (LL) 3 5 - 5 3 mmol/L    Chloride 101 100 - 108 mmol/L    CO2 24 21 - 32 mmol/L    ANION GAP 13 4 - 13 mmol/L    BUN 12 5 - 25 mg/dL    Creatinine 1 12 0 60 - 1 30 mg/dL    Glucose 84 65 - 140 mg/dL    Calcium 8 6 8 3 - 10 1 mg/dL    AST 23 5 - 45 U/L    ALT 35 12 - 78 U/L    Alkaline Phosphatase 50 46 - 116 U/L    Total Protein 7 8 6 4 - 8 2 g/dL    Albumin 4 1 3 5 - 5 0 g/dL    Total Bilirubin 0 50 0 20 - 1 00 mg/dL    eGFR 64 ml/min/1 73sq m   Magnesium   Result Value Ref Range    Magnesium 2 4 1 6 - 2 6 mg/dL   Troponin I   Result Value Ref Range    Troponin I <0 02 <=0 04 ng/mL       Imaging:   No orders to display       EKG, Pathology, and Other Studies:   EKG:  Indications / Diagnosis:  Hypokalemia   ECG reviewed by me, the ED Provider: yes    Patient location:  ED  Previous ECG:     Previous ECG comparison: When compared to EKG from April 22, 2019 no significant changes were found  Interpretation:     Interpretation: normal    Rate:     ECG rate:  82    ECG rate assessment: normal    Rhythm:     Rhythm: sinus rhythm    ST segments:     ST segments:  Non-specific  Code Status: Level 1 - Full Code  Advance Directive and Living Will:      Power of :    POLST:      Counseling / Coordination of Care: Total floor / unit time spent today 30 minutes

## 2019-07-05 NOTE — ASSESSMENT & PLAN NOTE
-K = 3 3 this morning  Will administer 40meq KCL x 1 IV in conjunction with 40 mg and potassium chloride p o  Q 2 hours x2 doses  Will continue normal saline with 20 mEq potassium chloride IV x1

## 2019-07-06 LAB
ANION GAP SERPL CALCULATED.3IONS-SCNC: 9 MMOL/L (ref 4–13)
ANION GAP SERPL CALCULATED.3IONS-SCNC: 9 MMOL/L (ref 4–13)
BUN SERPL-MCNC: 10 MG/DL (ref 5–25)
BUN SERPL-MCNC: 13 MG/DL (ref 5–25)
CALCIUM SERPL-MCNC: 7.6 MG/DL (ref 8.3–10.1)
CALCIUM SERPL-MCNC: 7.8 MG/DL (ref 8.3–10.1)
CHLORIDE SERPL-SCNC: 110 MMOL/L (ref 100–108)
CHLORIDE SERPL-SCNC: 113 MMOL/L (ref 100–108)
CO2 SERPL-SCNC: 19 MMOL/L (ref 21–32)
CO2 SERPL-SCNC: 20 MMOL/L (ref 21–32)
CREAT SERPL-MCNC: 0.82 MG/DL (ref 0.6–1.3)
CREAT SERPL-MCNC: 0.9 MG/DL (ref 0.6–1.3)
ERYTHROCYTE [DISTWIDTH] IN BLOOD BY AUTOMATED COUNT: 13.8 % (ref 11.6–15.1)
GFR SERPL CREATININE-BSD FRML MDRD: 83 ML/MIN/1.73SQ M
GFR SERPL CREATININE-BSD FRML MDRD: 93 ML/MIN/1.73SQ M
GLUCOSE SERPL-MCNC: 75 MG/DL (ref 65–140)
GLUCOSE SERPL-MCNC: 84 MG/DL (ref 65–140)
HCT VFR BLD AUTO: 33.3 % (ref 34.8–46.1)
HGB BLD-MCNC: 10.6 G/DL (ref 11.5–15.4)
MAGNESIUM SERPL-MCNC: 2.1 MG/DL (ref 1.6–2.6)
MAGNESIUM SERPL-MCNC: 2.2 MG/DL (ref 1.6–2.6)
MCH RBC QN AUTO: 31.2 PG (ref 26.8–34.3)
MCHC RBC AUTO-ENTMCNC: 31.8 G/DL (ref 31.4–37.4)
MCV RBC AUTO: 98 FL (ref 82–98)
PHOSPHATE SERPL-MCNC: 2.9 MG/DL (ref 2.7–4.5)
PLATELET # BLD AUTO: 157 THOUSANDS/UL (ref 149–390)
PMV BLD AUTO: 11.8 FL (ref 8.9–12.7)
POTASSIUM SERPL-SCNC: 3.3 MMOL/L (ref 3.5–5.3)
POTASSIUM SERPL-SCNC: 3.6 MMOL/L (ref 3.5–5.3)
POTASSIUM SERPL-SCNC: 3.8 MMOL/L (ref 3.5–5.3)
RBC # BLD AUTO: 3.4 MILLION/UL (ref 3.81–5.12)
SODIUM SERPL-SCNC: 139 MMOL/L (ref 136–145)
SODIUM SERPL-SCNC: 141 MMOL/L (ref 136–145)
WBC # BLD AUTO: 5.3 THOUSAND/UL (ref 4.31–10.16)

## 2019-07-06 PROCEDURE — 99232 SBSQ HOSP IP/OBS MODERATE 35: CPT | Performed by: FAMILY MEDICINE

## 2019-07-06 PROCEDURE — 80048 BASIC METABOLIC PNL TOTAL CA: CPT | Performed by: NURSE PRACTITIONER

## 2019-07-06 PROCEDURE — 83735 ASSAY OF MAGNESIUM: CPT | Performed by: NURSE PRACTITIONER

## 2019-07-06 PROCEDURE — 85027 COMPLETE CBC AUTOMATED: CPT | Performed by: STUDENT IN AN ORGANIZED HEALTH CARE EDUCATION/TRAINING PROGRAM

## 2019-07-06 PROCEDURE — 83735 ASSAY OF MAGNESIUM: CPT | Performed by: STUDENT IN AN ORGANIZED HEALTH CARE EDUCATION/TRAINING PROGRAM

## 2019-07-06 PROCEDURE — 84132 ASSAY OF SERUM POTASSIUM: CPT | Performed by: FAMILY MEDICINE

## 2019-07-06 PROCEDURE — 80048 BASIC METABOLIC PNL TOTAL CA: CPT | Performed by: STUDENT IN AN ORGANIZED HEALTH CARE EDUCATION/TRAINING PROGRAM

## 2019-07-06 PROCEDURE — 84100 ASSAY OF PHOSPHORUS: CPT | Performed by: STUDENT IN AN ORGANIZED HEALTH CARE EDUCATION/TRAINING PROGRAM

## 2019-07-06 RX ORDER — POTASSIUM CHLORIDE 20MEQ/15ML
40 LIQUID (ML) ORAL
Status: COMPLETED | OUTPATIENT
Start: 2019-07-06 | End: 2019-07-06

## 2019-07-06 RX ORDER — POTASSIUM CHLORIDE 14.9 MG/ML
20 INJECTION INTRAVENOUS
Status: COMPLETED | OUTPATIENT
Start: 2019-07-06 | End: 2019-07-06

## 2019-07-06 RX ORDER — ALBUMIN, HUMAN INJ 5% 5 %
SOLUTION INTRAVENOUS
Status: DISPENSED
Start: 2019-07-06 | End: 2019-07-06

## 2019-07-06 RX ADMIN — POTASSIUM CHLORIDE 20 MEQ: 200 INJECTION, SOLUTION INTRAVENOUS at 08:24

## 2019-07-06 RX ADMIN — POTASSIUM CHLORIDE 40 MEQ: 20 SOLUTION ORAL at 08:24

## 2019-07-06 RX ADMIN — POTASSIUM CHLORIDE 20 MEQ: 200 INJECTION, SOLUTION INTRAVENOUS at 10:35

## 2019-07-06 RX ADMIN — MECLIZINE HYDROCHLORIDE 25 MG: 25 TABLET ORAL at 21:28

## 2019-07-06 RX ADMIN — SODIUM CHLORIDE AND POTASSIUM CHLORIDE 100 ML/HR: .9; .15 SOLUTION INTRAVENOUS at 10:30

## 2019-07-06 RX ADMIN — POTASSIUM CHLORIDE 40 MEQ: 20 SOLUTION ORAL at 10:35

## 2019-07-06 RX ADMIN — Medication 50 MG: at 08:25

## 2019-07-06 RX ADMIN — Medication 100 MG: at 08:24

## 2019-07-06 RX ADMIN — ALBUMIN (HUMAN) 25 G: 12.5 SOLUTION INTRAVENOUS at 00:20

## 2019-07-06 RX ADMIN — AMITRIPTYLINE HYDROCHLORIDE 50 MG: 50 TABLET, FILM COATED ORAL at 21:28

## 2019-07-06 NOTE — UTILIZATION REVIEW
Initial Clinical Review    Admission: Date/Time/Statement: 7/5/19 @ 1008     Orders Placed This Encounter   Procedures    Inpatient Admission (expected length of stay for this patient Order details is greater than two midnights)     Standing Status:   Standing     Number of Occurrences:   1     Order Specific Question:   Admitting Physician     Answer:   Jamie Gonzales     Order Specific Question:   Level of Care     Answer:   Med Surg [[id number]]     Order Specific Question:   Estimated length of stay     Answer:   More than 2 Midnights     Order Specific Question:   Certification     Answer:   I certify that inpatient services are medically necessary for this patient for a duration of greater than two midnights  See H&P and MD Progress Notes for additional information about the patient's course of treatment  ED Arrival Information     Expected Arrival Acuity Means of Arrival Escorted By Service Admission Type    - 7/5/2019 08:17 Urgent [facility] General Medicine Urgent    Arrival Complaint    -        Chief Complaint   Patient presents with    Abnormal Lab     Patient had blood work done on Monday, potassium is low again  Was doing well prior to 6/19 and then had an episode of illness  Assessment/Plan: 27year old to the ED from home with abnormal labs, told she has low potassium  Admitted to inpatient for hypokalemia, h/o gastric bypass  H/O frequent hypokalemia, had an infusion of potassium in the infusion center prior to her arrival to the ED  Telemetry, IVfluids with potassium,  Replete with a goal of >4 0 potassium  On admission, it was 2 3  She has her blood drawn 2 times a week to check her k  And states when her weight falls below 150 lbs, her potassium tends to be lower  She complains of weakness, numbness,chest tightness  Nephrology Consult 7/5:  Agree with aggressive IV supplement  Magneiums normal   Also has ARF currently   Baseline creat 0 8-0 9, on admission:  1 12 Volume expansion, check urine sodium and creatinine     Update 7/6:  K = 3 3 this morning  Will administer 40meq KCL x 1 IV in conjunction with 40 mg and potassium chloride p o  Q 2 hours x2 doses  Will continue normal saline with 20 mEq potassium chloride IV x1  ED Triage Vitals   Temperature Pulse Respirations Blood Pressure SpO2   07/05/19 0825 07/05/19 0825 07/05/19 0825 07/05/19 0825 07/05/19 0825   98 °F (36 7 °C) 92 17 141/64 100 %      Temp Source Heart Rate Source Patient Position - Orthostatic VS BP Location FiO2 (%)   07/05/19 0825 07/05/19 0825 07/05/19 1030 07/05/19 0825 --   Temporal Monitor Sitting Left arm       Pain Score       07/05/19 0825       No Pain        Wt Readings from Last 1 Encounters:   07/05/19 71 3 kg (157 lb 3 oz)     Additional Vital Signs:   Date/Time Temp Pulse Resp BP SpO2   07/06/19 0747 98 °F (36 7 °C) 72 18 97/54 100 %   07/06/19 0203    81/45Abnormal     07/05/19 2326    78/48Abnormal     07/05/19 2324 97 4 °F (36 3 °C)Abnormal  73 18 79/39Abnormal  99 %   07/05/19 1503 97 5 °F (36 4 °C) 78 18 111/56 100 %   07/05/19 1055        07/05/19 1047 96 8 °F (36 °C)Abnormal  75 18 121/72 99 %   07/05/19 1030  81 18 114/54        Pertinent Labs/Diagnostic Test Results:   EKG: Normal sinus rhythm  Nonspecific T wave abnormality  Abnormal ECG  When compared with ECG of 22-APR-2019 08:54,  No significant change was found  Results from last 7 days   Lab Units 07/06/19  0627 07/05/19  0939   WBC Thousand/uL 5 30 6 35   HEMOGLOBIN g/dL 10 6* 14 4   HEMATOCRIT % 33 3* 43 4   PLATELETS Thousands/uL 157 265   NEUTROS ABS Thousands/µL  --  4 07         Results from last 7 days   Lab Units 07/06/19  0627 07/06/19  0006 07/05/19  1622 07/05/19  0940 07/05/19  0939   SODIUM mmol/L 141 139 134*  --  138   POTASSIUM mmol/L 3 3* 3 6 2 7*  --  2 3*   CHLORIDE mmol/L 113* 110* 106  --  101   CO2 mmol/L 19* 20* 17*  --  24   ANION GAP mmol/L 9 9 11  --  13   BUN mg/dL 10 13 13  --  12   CREATININE mg/dL 0 82 0 90 0 81  --  1 12   EGFR ml/min/1 73sq m 93 83 94  --  64   CALCIUM mg/dL 7 8* 7 6* 7 7*  --  8 6   MAGNESIUM mg/dL 2 1 2 2  --  2 4  --    PHOSPHORUS mg/dL 2 9  --   --   --   --      Results from last 7 days   Lab Units 07/05/19  0939   AST U/L 23   ALT U/L 35   ALK PHOS U/L 50   TOTAL PROTEIN g/dL 7 8   ALBUMIN g/dL 4 1   TOTAL BILIRUBIN mg/dL 0 50         Results from last 7 days   Lab Units 07/06/19  0627 07/06/19  0006 07/05/19  1622 07/05/19  0939   GLUCOSE RANDOM mg/dL 84 75 96 84       Results from last 7 days   Lab Units 07/05/19  0939   TROPONIN I ng/mL <0 02         Results from last 7 days   Lab Units 07/05/19  0938   PROTIME seconds 12 8   INR  0 96   PTT seconds 29       Results from last 7 days   Lab Units 07/05/19  1325   SODIUM UR  12   CREATININE UR mg/dL 103 0       ED Treatment:   Medication Administration from 07/05/2019 0817 to 07/05/2019 1042       Date/Time Order Dose Route Action     07/05/2019 0944 sodium chloride 0 9 % bolus 1,000 mL 1,000 mL Intravenous New Bag     07/05/2019 1023 potassium chloride (K-DUR,KLOR-CON) CR tablet 40 mEq 40 mEq Oral Given     07/05/2019 1023 potassium chloride 20 mEq IVPB (premix) 20 mEq Intravenous New Bag     07/05/2019 1023 sodium chloride 0 9 % infusion 75 mL/hr Intravenous New Bag        Past Medical History:   Diagnosis Date    H  pylori infection     Hypokalemia     Migraine        Admitting Diagnosis: Hypokalemia [E87 6]  GERD (gastroesophageal reflux disease) [K21 9]  WAGNER (obstructive sleep apnea) [G47 33]  Abnormal laboratory test result [R89 9]  History of gastric bypass [Z98 84]  Age/Sex: 27 y o  female  Admission Orders:  Tele  BMP, potassium  Current Facility-Administered Medications:  acetaminophen 650 mg Oral Q6H PRN   amitriptyline 50 mg Oral HS   meclizine 25 mg Oral HS   ondansetron 4 mg Intravenous Q6H PRN   potassium chloride 20 mEq Intravenous Q2H   sodium chloride 0 9 % with KCl 20 mEq/L 100 mL/hr Intravenous Continuous thiamine 100 mg Oral Daily   zinc gluconate 50 mg Oral Daily       IP CONSULT TO CASE MANAGEMENT  IP CONSULT TO NEPHROLOGY    Network Utilization Review Department  Phone: [phone]; Fax [phone]  [email or web address]  ATTENTION: Please call with any questions or concerns to [phone]  and carefully listen to the prompts so that you are directed to the right person  Send all requests for admission clinical reviews, approved or denied determinations and any other requests to fax [phone]   All voicemails are confidential

## 2019-07-06 NOTE — UTILIZATION REVIEW
Notification of Inpatient Admission/Inpatient Authorization Request  This is a Notification of Inpatient Admission/Request for Inpatient Authorization for our facility [address] [address]  Be advised that this patient was admitted to our facility under Inpatient Status  Please contact the Utilization Review Department where the patient is receiving care services for additional admission information  Place of Service Code: 24   Place of Service Name: Inpatient Hospital  Presentation Date & Time: 7/5/2019  8:20 AM  Inpatient Admission Date & Time: 7/5/19 1008  Discharge Date & Time: No discharge date for patient encounter  Discharge Disposition (if discharged): [address] [place] [address]  Attending Physician: Ryan Whelan [address] Rachel Hsu [[id number]]  Admission Orders (From admission, onward)    Ordered        07/05/19 1008  Inpatient Admission (expected length of stay for this patient Order details is greater than two midnights)  Once             [facility]  Utilization Review Department  Phone: [phone]; Fax [phone]  [email or web address]  ATTENTION: Please call with any questions or concerns to [phone]  and carefully listen to the prompts so that you are directed to the right person  Send all requests for admission clinical reviews, approved or denied determinations and any other requests to fax [phone]   All voicemails are confidential

## 2019-07-06 NOTE — PROGRESS NOTES
Progress Note - Sylvester Ross 1985, 27 y o  female MRN: [id number]    Unit/Bed#: 405-01 Encounter: [id number]    Primary Care Provider: Galen Irving MD   Date and time admitted to hospital: 7/5/2019  8:20 AM        * Hypokalemia  Assessment & Plan  -K = 3 3 this morning  Will administer 40meq KCL x 1 IV in conjunction with 40 mg and potassium chloride p o  Q 2 hours x2 doses  Will continue normal saline with 20 mEq potassium chloride IV x1      History of gastric bypass  Assessment & Plan  Patient encouraged to gain weight  She is asymptomatic when she is greater than 160 lb        Progress Note - Sylvester Ross 27 y o  female MRN: [id number]    Unit/Bed#: 405-01 Encounter: [id number]        Subjective:   Patient seen and examined  Feels better  Still with a little numbness but much better     Objective:     Vitals:   Vitals:    07/06/19 0747   BP: 97/54   Pulse: 72   Resp: 18   Temp: 98 °F (36 7 °C)   SpO2: 100%     Body mass index is 27 84 kg/m²      Intake/Output Summary (Last 24 hours) at 7/6/2019 1015  Last data filed at 7/5/2019 2310  Gross per 24 hour   Intake 2638 75 ml   Output 1000 ml   Net 1638 75 ml       Physical Exam:   BP 97/54 (BP Location: Right arm)   Pulse 72   Temp 98 °F (36 7 °C) (Temporal)   Resp 18   Ht 5' 3" (1 6 m)   Wt 71 3 kg (157 lb 3 oz)   SpO2 100%   BMI 27 84 kg/m²   General appearance: alert and oriented, in no acute distress  Head: Normocephalic, without obvious abnormality, atraumatic  Lungs: clear to auscultation bilaterally  Heart: regular rate and rhythm, S1, S2 normal, no murmur, click, rub or gallop  Abdomen: soft, non-tender; bowel sounds normal; no masses,  no organomegaly  Extremities: extremities normal, warm and well-perfused; no cyanosis, clubbing, or edema  Pulses: 2+ and symmetric  Neurologic: Grossly normal     Invasive Devices     Central Venous Catheter Line            Port A Cath 12/22/18 Right Chest 195 days          Peripheral Intravenous Line Peripheral IV 07/05/19 Right Forearm 1 day                Results from last 7 days   Lab Units 07/06/19  0627 07/05/19  0939   WBC Thousand/uL 5 30 6 35   HEMOGLOBIN g/dL 10 6* 14 4   HEMATOCRIT % 33 3* 43 4   PLATELETS Thousands/uL 157 265       Results from last 7 days   Lab Units 07/06/19  0627 07/06/19  0006 07/05/19  1622 07/05/19  0939   POTASSIUM mmol/L 3 3* 3 6 2 7* 2 3*   CHLORIDE mmol/L 113* 110* 106 101   CO2 mmol/L 19* 20* 17* 24   BUN mg/dL 10 13 13 12   CREATININE mg/dL 0 82 0 90 0 81 1 12   CALCIUM mg/dL 7 8* 7 6* 7 7* 8 6   ALK PHOS U/L  --   --   --  50   ALT U/L  --   --   --  35   AST U/L  --   --   --  23       Medication Administration - last 24 hours from 07/05/2019 1015 to 07/06/2019 1015       Date/Time Order Dose Route Action Action by     07/05/2019 1022 sodium chloride 0 9 % bolus 1,000 mL 0 mL Intravenous Stopped Isha Maddox RN     07/05/2019 1023 potassium chloride (K-DUR,KLOR-CON) CR tablet 40 mEq 40 mEq Oral Given Isha Maddox RN     07/05/2019 1023 potassium chloride 20 mEq IVPB (premix) 20 mEq Intravenous New Bag Isha Maddox RN     07/05/2019 1201 sodium chloride 0 9 % infusion 0 mL/hr Intravenous Stopped Brendon Quarles RN     07/05/2019 1056 sodium chloride 0 9 % infusion 150 mL/hr Intravenous Rate/Dose Change Isha Maddox RN     07/05/2019 1023 sodium chloride 0 9 % infusion 75 mL/hr Intravenous New Bag Isha Maddox RN     07/05/2019 1355 AMILoride tablet 5 mg 5 mg Oral Given Brendon Quarles RN     07/05/2019 2123 amitriptyline (ELAVIL) tablet 50 mg 50 mg Oral Given Ramsey Hobbs RN     07/05/2019 2123 meclizine (ANTIVERT) tablet 25 mg 25 mg Oral Given Ramsey Hobbs RN     07/06/2019 1767 thiamine (VITAMIN B1) tablet 100 mg 100 mg Oral Given Alli Wright RN     07/05/2019 1326 thiamine (VITAMIN B1) tablet 100 mg 100 mg Oral Given Brendon Quarles RN     07/06/2019 0825 zinc gluconate tablet 50 mg 50 mg Oral Given Alli Wright RN 07/05/2019 1355 zinc gluconate tablet 50 mg 50 mg Oral Given Geovanny Alvarado RN     07/05/2019 1325 potassium chloride 20 mEq IVPB (premix) 20 mEq Intravenous [address] Geovanny Alvarado RN     07/05/2019 2310 sodium chloride 0 9 % with KCl 20 mEq/L infusion (premix) 100 mL/hr Intravenous [address] Jose Parikh RN     07/05/2019 1325 sodium chloride 0 9 % with KCl 20 mEq/L infusion (premix) 100 mL/hr Intravenous [address] Geovanny Alvarado RN     07/05/2019 1325 potassium chloride (K-DUR,KLOR-CON) CR tablet 40 mEq 40 mEq Oral Given Geovanny Alvarado RN     07/05/2019 1735 potassium chloride 10 % oral solution 40 mEq 40 mEq Oral Given Jose Parikh, ELYSSA     07/05/2019 1323 potassium chloride 10 % oral solution 40 mEq 40 mEq Oral Given Geovanny Alvarado RN     07/05/2019 2123 potassium chloride 20 mEq IVPB (premix) 20 mEq Intravenous [address] Jose Parikh RN     07/05/2019 1830 potassium chloride 20 mEq IVPB (premix) 20 mEq Intravenous [address] Jose Parikh, ELYSSA     07/05/2019 2308 potassium chloride 10 % oral solution 40 mEq 40 mEq Oral Given Jose Parikh, ELYSSA     07/05/2019 2122 potassium chloride 10 % oral solution 40 mEq 40 mEq Oral Given Jose Parikh RN     07/05/2019 1906 potassium chloride 10 % oral solution 40 mEq 40 mEq Oral Given Jose KatiELYSSA stewart     07/06/2019 0026 albumin human (FLEXBUMIN) 5 % injection 25 g   Intravenous Not Given [facility]     07/06/2019 0020 albumin human (FLEXBUMIN) 5 % injection 25 g 25 g Intravenous [address] [facility]     07/06/2019 2266 potassium chloride 20 mEq IVPB (premix) 20 mEq Intravenous [address] [facility]     07/06/2019 4989 potassium chloride 10 % oral solution 40 mEq 40 mEq Oral Given [address]tron, RN            Lab, Imaging and other studies: I have personally reviewed pertinent reports      VTE Pharmacologic Prophylaxis: Enoxaparin (Lovenox)  VTE Mechanical Prophylaxis: sequential compression device     Luis Manuel Pereira MD  7/6/2019,10:15 AM

## 2019-07-06 NOTE — PROGRESS NOTES
Progress Note - Nephrology   Jannet Moreira 27 y o  female MRN: [id number]  Unit/Bed#: 405-01 Encounter: [id number]    A/P:  1  Hypokalemia               potassium level significantly improved, she has been able to improve easily compared to prior hospitalizations  She is currently on home dosing of potassium supplementation, she is to have a few more doses via IV route potassium chloride infusions  2  Acute renal failure               patient appears to be volume resuscitated, discontinue IV fluids at this time from the volume resuscitation standpoint  3  History of hypophosphatemia              Phosphorus level is appropriate, continue to monitor from time to time  4  History of gastric bypass      Follow up reason for today's visit:  Hypokalemia/acute kidney failure    Hypokalemia    Patient Active Problem List   Diagnosis    Hypokalemia    History of gastric bypass    Migraine without aura and without status migrainosus, not intractable    Left-sided weakness    Hypophosphatemia    Anemia    Vitamin D deficiency    Weakness of both lower extremities    Weakness of both upper extremities    Elevated CPK    Vitamin B12 deficiency    Cervical lymphadenopathy    Generalized weakness    Paresthesia of both lower extremities    Paresthesias    B12 deficiency    Gastric bypass status for obesity    GERD (gastroesophageal reflux disease)    Migraine    WAGNER (obstructive sleep apnea)    Other intestinal malabsorption    Acute kidney injury ([facility] [address] )    Right ovarian cyst    Chest pain         Subjective:   Patient in the significantly better, nausea vomiting diarrhea  Objective:     Vitals: Blood pressure 97/54, pulse 72, temperature 98 °F (36 7 °C), temperature source Temporal, resp  rate 18, height 5' 3" (1 6 m), weight 71 3 kg (157 lb 3 oz), SpO2 100 %  ,Body mass index is 27 84 kg/m²      Weight (last 2 days)     Date/Time   Weight    07/05/19 1047   71 3 (157 19)    07/05/19 0825   74 3 (163 8) Has boot on right foot    Weight: Has boot on right foot at 07/05/19 0825                Intake/Output Summary (Last 24 hours) at 7/6/2019 1140  Last data filed at 7/6/2019 0900  Gross per 24 hour   Intake 1878 75 ml   Output 1850 ml   Net 28 75 ml     I/O last 3 completed shifts: In: 2638 8 [P O :360; I V :1278 8; IV Piggyback:1000]  Out: 1000 [Urine:1000]         Physical Exam: BP 97/54 (BP Location: Right arm)   Pulse 72   Temp 98 °F (36 7 °C) (Temporal)   Resp 18   Ht 5' 3" (1 6 m)   Wt 71 3 kg (157 lb 3 oz)   SpO2 100%   BMI 27 84 kg/m²     General Appearance:    Alert, cooperative, no distress, appears stated age   Head:    Normocephalic, without obvious abnormality, atraumatic   Eyes:    Conjunctiva/corneas clear   Ears:    Normal external ears   Nose:   Nares normal, septum midline, mucosa normal, no drainage    or sinus tenderness   Throat:   Lips, mucosa, and tongue normal; teeth and gums normal   Neck:   Supple   Back:     Symmetric, no curvature, ROM normal, no CVA tenderness   Lungs:     Clear to auscultation bilaterally, respirations unlabored   Chest wall:    No tenderness or deformity   Heart:    Regular rate and rhythm, S1 and S2 normal, no murmur, rub   or gallop   Abdomen:     Soft, non-tender, bowel sounds active   Extremities:   Extremities normal, atraumatic, no cyanosis or edema   Skin:   Skin color, texture, turgor normal, no rashes or lesions   Lymph nodes:   Cervical normal   Neurologic:   CNII-XII intact            Lab, Imaging and other studies: I have personally reviewed pertinent labs    CBC:   Lab Results   Component Value Date    WBC 5 30 07/06/2019    HGB 10 6 (L) 07/06/2019    HCT 33 3 (L) 07/06/2019    MCV 98 07/06/2019     07/06/2019    MCH 31 2 07/06/2019    MCHC 31 8 07/06/2019    RDW 13 8 07/06/2019    MPV 11 8 07/06/2019     CMP:   Lab Results   Component Value Date    K 3 3 (L) 07/06/2019     (H) 07/06/2019    CO2 19 (L) 07/06/2019    BUN 10 07/06/2019    CREATININE 0 82 07/06/2019    CALCIUM 7 8 (L) 07/06/2019    EGFR 93 07/06/2019         Results from last 7 days   Lab Units 07/06/19  0627 07/06/19  0006 07/05/19  1622 07/05/19  0939   POTASSIUM mmol/L 3 3* 3 6 2 7* 2 3*   CHLORIDE mmol/L 113* 110* 106 101   CO2 mmol/L 19* 20* 17* 24   BUN mg/dL 10 13 13 12   CREATININE mg/dL 0 82 0 90 0 81 1 12   CALCIUM mg/dL 7 8* 7 6* 7 7* 8 6   ALK PHOS U/L  --   --   --  50   ALT U/L  --   --   --  35   AST U/L  --   --   --  23         Phosphorus:   Lab Results   Component Value Date    PHOS 2 9 07/06/2019     Magnesium:   Lab Results   Component Value Date    MG 2 1 07/06/2019     Urinalysis: No results found for: COLORU, CLARITYU, SPECGRAV, PHUR, LEUKOCYTESUR, NITRITE, PROTEINUA, GLUCOSEU, KETONESU, BILIRUBINUR, BLOODU  Ionized Calcium: No results found for: CAION  Coagulation: No results found for: PT, INR, APTT  Troponin: No results found for: TROPONINI  ABG: No results found for: PHART, DPX2FBQ, PO2ART, LWJ3ZVO, S9BETJLF, BEART, SOURCE  Radiology review:     IMAGING  No results found      Current Facility-Administered Medications   Medication Dose Route Frequency    acetaminophen (TYLENOL) tablet 650 mg  650 mg Oral Q6H PRN    amitriptyline (ELAVIL) tablet 50 mg  50 mg Oral HS    meclizine (ANTIVERT) tablet 25 mg  25 mg Oral HS    ondansetron (ZOFRAN) injection 4 mg  4 mg Intravenous Q6H PRN    potassium chloride 20 mEq IVPB (premix)  20 mEq Intravenous Q2H    thiamine (VITAMIN B1) tablet 100 mg  100 mg Oral Daily    zinc gluconate tablet 50 mg  50 mg Oral Daily     Medications Discontinued During This Encounter   Medication Reason    sodium chloride 0 9 % infusion     potassium chloride 10 % oral solution 40 mEq     AMILoride tablet 5 mg     sodium chloride 0 9 % with KCl 20 mEq/L infusion (premix)        Lore Oro DO

## 2019-07-07 VITALS
SYSTOLIC BLOOD PRESSURE: 104 MMHG | DIASTOLIC BLOOD PRESSURE: 51 MMHG | HEIGHT: 63 IN | HEART RATE: 92 BPM | TEMPERATURE: 98.5 F | BODY MASS INDEX: 27.85 KG/M2 | OXYGEN SATURATION: 98 % | WEIGHT: 157.19 LBS | RESPIRATION RATE: 18 BRPM

## 2019-07-07 LAB
ANION GAP SERPL CALCULATED.3IONS-SCNC: 11 MMOL/L (ref 4–13)
BUN SERPL-MCNC: 13 MG/DL (ref 5–25)
CALCIUM SERPL-MCNC: 7.7 MG/DL (ref 8.3–10.1)
CHLORIDE SERPL-SCNC: 115 MMOL/L (ref 100–108)
CO2 SERPL-SCNC: 18 MMOL/L (ref 21–32)
CREAT SERPL-MCNC: 0.8 MG/DL (ref 0.6–1.3)
GFR SERPL CREATININE-BSD FRML MDRD: 96 ML/MIN/1.73SQ M
GLUCOSE SERPL-MCNC: 80 MG/DL (ref 65–140)
POTASSIUM SERPL-SCNC: 3.4 MMOL/L (ref 3.5–5.3)
SODIUM SERPL-SCNC: 144 MMOL/L (ref 136–145)

## 2019-07-07 PROCEDURE — 99239 HOSP IP/OBS DSCHRG MGMT >30: CPT | Performed by: FAMILY MEDICINE

## 2019-07-07 PROCEDURE — 80048 BASIC METABOLIC PNL TOTAL CA: CPT | Performed by: FAMILY MEDICINE

## 2019-07-07 RX ORDER — POTASSIUM CHLORIDE 20MEQ/15ML
40 LIQUID (ML) ORAL
Status: COMPLETED | OUTPATIENT
Start: 2019-07-07 | End: 2019-07-07

## 2019-07-07 RX ORDER — POTASSIUM CHLORIDE 14.9 MG/ML
20 INJECTION INTRAVENOUS
Status: COMPLETED | OUTPATIENT
Start: 2019-07-07 | End: 2019-07-07

## 2019-07-07 RX ORDER — POTASSIUM CHLORIDE 20MEQ/15ML
LIQUID (ML) ORAL
Qty: 10800 ML | Refills: 0 | Status: SHIPPED | OUTPATIENT
Start: 2019-07-07 | End: 2019-10-02 | Stop reason: HOSPADM

## 2019-07-07 RX ADMIN — POTASSIUM CHLORIDE 20 MEQ: 200 INJECTION, SOLUTION INTRAVENOUS at 09:07

## 2019-07-07 RX ADMIN — Medication 100 MG: at 09:04

## 2019-07-07 RX ADMIN — POTASSIUM CHLORIDE 40 MEQ: 20 SOLUTION ORAL at 11:04

## 2019-07-07 RX ADMIN — Medication 50 MG: at 09:05

## 2019-07-07 RX ADMIN — POTASSIUM CHLORIDE 40 MEQ: 20 SOLUTION ORAL at 09:04

## 2019-07-07 RX ADMIN — POTASSIUM CHLORIDE 20 MEQ: 200 INJECTION, SOLUTION INTRAVENOUS at 11:41

## 2019-07-07 NOTE — PLAN OF CARE
Problem: DISCHARGE PLANNING - CARE MANAGEMENT  Goal: Discharge to post-acute care or home with appropriate resources  Description  INTERVENTIONS:  - Conduct assessment to determine patient/family and health care team treatment goals, and need for post-acute services based on payer coverage, community resources, and patient preferences, and barriers to discharge  - Address psychosocial, clinical, and financial barriers to discharge as identified in assessment in conjunction with the patient/family and health care team  - Arrange appropriate level of post-acute services according to patient's   needs and preference and payer coverage in collaboration with the physician and health care team  - Communicate with and update the patient/family, physician, and health care team regarding progress on the discharge plan  - Arrange appropriate transportation to post-acute venues  Pt will continue to get blood worked checked twice a week and potassium infusions PRN      Outcome: Progressing     Problem: PAIN - ADULT  Goal: Verbalizes/displays adequate comfort level or baseline comfort level  Description  Interventions:  - Encourage patient to monitor pain and request assistance  - Assess pain using appropriate pain scale  - Administer analgesics based on type and severity of pain and evaluate response  - Implement non-pharmacological measures as appropriate and evaluate response  - Consider cultural and social influences on pain and pain management  - Notify physician/advanced practitioner if interventions unsuccessful or patient reports new pain  Outcome: Progressing     Problem: INFECTION - ADULT  Goal: Absence or prevention of progression during hospitalization  Description  INTERVENTIONS:  - Assess and monitor for signs and symptoms of infection  - Monitor lab/diagnostic results  - Monitor all insertion sites, i e  indwelling lines, tubes, and drains  - Administer medications as ordered  - Instruct and encourage patient and family to use good hand hygiene technique  - Identify and instruct in appropriate isolation precautions for identified infection/condition   Outcome: Progressing     Problem: SAFETY ADULT  Goal: Patient will remain free of falls  Description  INTERVENTIONS:  - Assess patient frequently for physical needs  -  Identify cognitive and physical deficits and behaviors that affect risk of falls    -  [place] fall precautions as indicated by assessment   - Educate patient/family on patient safety including physical limitations  - Instruct patient to call for assistance with activity based on assessment  - Modify environment to reduce risk of injury  - Consider OT/PT consult to assist with strengthening/mobility  Outcome: Progressing  Goal: Maintain or return to baseline ADL function  Description  INTERVENTIONS:  -  Assess patient's ability to carry out ADLs; assess patient's baseline for ADL function and identify physical deficits which impact ability to perform ADLs (bathing, care of mouth/teeth, toileting, grooming, dressing, etc )  - Assess/evaluate cause of self-care deficits   - Assess range of motion  - Assess patient's mobility; develop plan if impaired  - Assess patient's need for assistive devices and provide as appropriate  - Encourage maximum independence but intervene and supervise when necessary  ¯ Involve family in performance of ADLs  ¯ Assess for home care needs following discharge   ¯ Request OT consult to assist with ADL evaluation and planning for discharge  ¯ Provide patient education as appropriate  Outcome: Progressing  Goal: Maintain or return mobility status to optimal level  Description  INTERVENTIONS:  - Assess patient's baseline mobility status (ambulation, transfers, stairs, etc )    - Identify cognitive and physical deficits and behaviors that affect mobility  - Identify mobility aids required to assist with transfers and/or ambulation (gait belt, sit-to-stand, lift, walker, cane, etc )  - [place] fall precautions as indicated by assessment  - Record patient progress and toleration of activity level on Mobility SBAR; progress patient to next Phase/Stage  - Instruct patient to call for assistance with activity based on assessment  - Request Rehabilitation consult to assist with strengthening/weightbearing, etc   Outcome: Progressing     Problem: DISCHARGE PLANNING  Goal: Discharge to home or other facility with appropriate resources  Description  INTERVENTIONS:  - Identify barriers to discharge w/patient and caregiver  - Arrange for needed discharge resources and transportation as appropriate  - Identify discharge learning needs (meds, wound care, etc   - Refer to Case Management Department for coordinating discharge planning if the patient needs post-hospital services based on physician/advanced practitioner order or complex needs related to functional status, cognitive ability, or social support system   Outcome: Progressing     Problem: Knowledge Deficit  Goal: Patient/family/caregiver demonstrates understanding of disease process, treatment plan, medications, and discharge instructions  Description  Complete learning assessment and assess knowledge base    Interventions:  - Provide teaching at level of understanding  - Provide teaching via preferred learning methods  Outcome: Progressing     Problem: METABOLIC, FLUID AND ELECTROLYTES - ADULT  Goal: Electrolytes maintained within normal limits  Description  INTERVENTIONS:  - Monitor labs and assess patient for signs and symptoms of electrolyte imbalances  - Administer electrolyte replacement as ordered  - Monitor response to electrolyte replacements, including repeat lab results as appropriate  - Instruct patient on fluid and nutrition as appropriate  Outcome: Progressing  Goal: Fluid balance maintained  Description  INTERVENTIONS:  - Monitor labs and assess for signs and symptoms of volume excess or deficit  - Instruct patient on fluid and nutrition as appropriate   Outcome: Progressing

## 2019-07-07 NOTE — PROGRESS NOTES
Progress Note - Nephrology   Kareem Plant 27 y o  female MRN: [id number]  Unit/Bed#: 405-01 Encounter: [id number]    A/P:  1  Hypokalemia               potassium level nearly normalized morning, at 3 4 millimole/L  Continue with potassium supplementation however, will increase from 40 mEq 5 times a day up to 60 mEq twice a day with the other 3 times to continue his 40 mEq  Patient continued to try to "gain weight" in order to help better support potassium levels  2  Acute renal failure               resolved    3  History of hypophosphatemia              Phosphorus level is appropriate, continue to monitor from time to time  4  History of gastric bypass      Follow up reason for today's visit:  Hypokalemia/acute kidney failure    Hypokalemia    Patient Active Problem List   Diagnosis    Hypokalemia    History of gastric bypass    Migraine without aura and without status migrainosus, not intractable    Left-sided weakness    Hypophosphatemia    Anemia    Vitamin D deficiency    Weakness of both lower extremities    Weakness of both upper extremities    Elevated CPK    Vitamin B12 deficiency    Cervical lymphadenopathy    Generalized weakness    Paresthesia of both lower extremities    Paresthesias    B12 deficiency    Gastric bypass status for obesity    GERD (gastroesophageal reflux disease)    Migraine    WAGNER (obstructive sleep apnea)    Other intestinal malabsorption    Acute [facility] injury ([facility] [address] )    Right ovarian cyst    Chest pain         Subjective:   Patient in the significantly better  Objective:     Vitals: Blood pressure 104/51, pulse 92, temperature 98 5 °F (36 9 °C), temperature source Temporal, resp  rate 18, height 5' 3" (1 6 m), weight 71 3 kg (157 lb 3 oz), SpO2 98 %  ,Body mass index is 27 84 kg/m²      Weight (last 2 days)     Date/Time   Weight    07/05/19 1047   71 3 (157 19)    07/05/19 0825   74 3 (163 8) Has boot on right foot    Weight: Has boot on right foot at 07/05/19 0825                Intake/Output Summary (Last 24 hours) at 7/7/2019 0942  Last data filed at 7/7/2019 0810  Gross per 24 hour   Intake 240 ml   Output 900 ml   Net -660 ml     I/O last 3 completed shifts: In: 1455 [P O :480; I V :975]  Out: 1350 [Urine:1350]         Physical Exam: /51 (BP Location: Right arm)   Pulse 92   Temp 98 5 °F (36 9 °C) (Temporal)   Resp 18   Ht 5' 3" (1 6 m)   Wt 71 3 kg (157 lb 3 oz)   SpO2 98%   BMI 27 84 kg/m²     General Appearance:    Alert, cooperative, no distress, appears stated age   Head:    Normocephalic, without obvious abnormality, atraumatic   Eyes:    Conjunctiva/corneas clear   Ears:    Normal external ears   Nose:   Nares normal, septum midline, mucosa normal, no drainage    or sinus tenderness   Throat:   Lips, mucosa, and tongue normal; teeth and gums normal   Neck:   Supple   Back:     Symmetric, no curvature, ROM normal, no CVA tenderness   Lungs:     Clear to auscultation bilaterally, respirations unlabored   Chest wall:    No tenderness or deformity   Heart:    Regular rate and rhythm, S1 and S2 normal, no murmur, rub   or gallop   Abdomen:     Soft, non-tender, bowel sounds active   Extremities:   Extremities normal, atraumatic, no cyanosis or edema   Skin:   Skin color, texture, turgor normal, no rashes or lesions   Lymph nodes:   Cervical normal   Neurologic:   CNII-XII intact            Lab, Imaging and other studies: I have personally reviewed pertinent labs  CBC:   No results found for: WBC, HGB, HCT, MCV, PLT, ADJUSTEDWBC, MCH, MCHC, RDW, MPV, NRBC  CMP:   Lab Results   Component Value Date    K 3 4 (L) 07/07/2019     (H) 07/07/2019    CO2 18 (L) 07/07/2019    BUN 13 07/07/2019    CREATININE 0 80 07/07/2019    CALCIUM 7 7 (L) 07/07/2019    EGFR 96 07/07/2019           Results from last 7 days   Lab Units 07/07/19  0530 07/06/19  1835 07/06/19  0627 07/06/19  0006  07/05/19  0939   POTASSIUM mmol/L 3 4* 3 8 3 3* 3 6   < > 2 3* CHLORIDE mmol/L 115*  --  113* 110*   < > 101   CO2 mmol/L 18*  --  19* 20*   < > 24   BUN mg/dL 13  --  10 13   < > 12   CREATININE mg/dL 0 80  --  0 82 0 90   < > 1 12   CALCIUM mg/dL 7 7*  --  7 8* 7 6*   < > 8 6   ALK PHOS U/L  --   --   --   --   --  50   ALT U/L  --   --   --   --   --  35   AST U/L  --   --   --   --   --  23    < > = values in this interval not displayed  Phosphorus:   No results found for: PHOS  Magnesium:   No results found for: MG  Urinalysis: No results found for: [address], SPECGRAV, PHUR, LEUKOCYTESUR, NITRITE, PROTEINUA, GLUCOSEU, KETONESU, BILIRUBINUR, BLOODU  Ionized Calcium: No results found for: CAION  Coagulation: No results found for: PT, INR, APTT  Troponin: No results found for: TROPONINI  ABG: No results found for: PHART, OPX6XIZ, PO2ART, KJN5PYG, [id number], BEART, SOURCE  Radiology review:     IMAGING  No results found      Current Facility-Administered Medications   Medication Dose Route Frequency    acetaminophen (TYLENOL) tablet 650 mg  650 mg Oral Q6H PRN    amitriptyline (ELAVIL) tablet 50 mg  50 mg Oral HS    enoxaparin (LOVENOX) subcutaneous injection 40 mg  40 mg Subcutaneous Q24H    meclizine (ANTIVERT) tablet 25 mg  25 mg Oral HS    ondansetron (ZOFRAN) injection 4 mg  4 mg Intravenous Q6H PRN    potassium chloride 10 % oral solution 40 mEq  40 mEq Oral Q2H    potassium chloride 20 mEq IVPB (premix)  20 mEq Intravenous Q2H    thiamine (VITAMIN B1) tablet 100 mg  100 mg Oral Daily    zinc gluconate tablet 50 mg  50 mg Oral Daily     Medications Discontinued During This Encounter   Medication Reason    sodium chloride 0 9 % infusion     potassium chloride 10 % oral solution 40 mEq     AMILoride tablet 5 mg     sodium chloride 0 9 % with KCl 20 mEq/L infusion (premix)     potassium chloride 10 % oral solution        Yoon Ho DO

## 2019-07-14 ENCOUNTER — HOSPITAL ENCOUNTER (INPATIENT)
Facility: HOSPITAL | Age: 34
LOS: 1 days | Discharge: HOME | DRG: 425 | End: 2019-07-16
Attending: EMERGENCY MEDICINE | Admitting: FAMILY MEDICINE
Payer: COMMERCIAL

## 2019-07-14 DIAGNOSIS — R51.9 HEADACHE: ICD-10-CM

## 2019-07-14 DIAGNOSIS — E87.6 ACUTE HYPOKALEMIA: Primary | ICD-10-CM

## 2019-07-14 DIAGNOSIS — R11.2 NAUSEA AND VOMITING: ICD-10-CM

## 2019-07-14 DIAGNOSIS — R20.2 PARESTHESIAS: ICD-10-CM

## 2019-07-14 PROBLEM — R42 VERTIGO: Status: ACTIVE | Noted: 2019-07-14

## 2019-07-14 PROBLEM — M84.374D STRESS FRACTURE OF RIGHT FOOT WITH ROUTINE HEALING: Status: ACTIVE | Noted: 2019-07-14

## 2019-07-14 LAB
ALBUMIN SERPL BCP-MCNC: 3.7 G/DL (ref 3.5–5)
ALP SERPL-CCNC: 47 U/L (ref 46–116)
ALT SERPL W P-5'-P-CCNC: 26 U/L (ref 12–78)
ANION GAP SERPL CALCULATED.3IONS-SCNC: 10 MMOL/L (ref 4–13)
AST SERPL W P-5'-P-CCNC: 19 U/L (ref 5–45)
BASOPHILS # BLD AUTO: 0.04 THOUSANDS/ΜL (ref 0–0.1)
BASOPHILS NFR BLD AUTO: 1 % (ref 0–1)
BILIRUB SERPL-MCNC: 0.4 MG/DL (ref 0.2–1)
BUN SERPL-MCNC: 10 MG/DL (ref 5–25)
CALCIUM SERPL-MCNC: 8.1 MG/DL (ref 8.3–10.1)
CHLORIDE SERPL-SCNC: 105 MMOL/L (ref 100–108)
CO2 SERPL-SCNC: 24 MMOL/L (ref 21–32)
CREAT SERPL-MCNC: 0.95 MG/DL (ref 0.6–1.3)
EOSINOPHIL # BLD AUTO: 0.07 THOUSAND/ΜL (ref 0–0.61)
EOSINOPHIL NFR BLD AUTO: 1 % (ref 0–6)
ERYTHROCYTE [DISTWIDTH] IN BLOOD BY AUTOMATED COUNT: 13.6 % (ref 11.6–15.1)
GFR SERPL CREATININE-BSD FRML MDRD: 78 ML/MIN/1.73SQ M
GLUCOSE SERPL-MCNC: 87 MG/DL (ref 65–140)
HCT VFR BLD AUTO: 38.1 % (ref 34.8–46.1)
HGB BLD-MCNC: 12.6 G/DL (ref 11.5–15.4)
HOLD SPECIMEN: NORMAL
IMM GRANULOCYTES # BLD AUTO: 0.02 THOUSAND/UL (ref 0–0.2)
IMM GRANULOCYTES NFR BLD AUTO: 0 % (ref 0–2)
LIPASE SERPL-CCNC: 97 U/L (ref 73–393)
LYMPHOCYTES # BLD AUTO: 1.28 THOUSANDS/ΜL (ref 0.6–4.47)
LYMPHOCYTES NFR BLD AUTO: 21 % (ref 14–44)
MCH RBC QN AUTO: 31 PG (ref 26.8–34.3)
MCHC RBC AUTO-ENTMCNC: 33.1 G/DL (ref 31.4–37.4)
MCV RBC AUTO: 94 FL (ref 82–98)
MONOCYTES # BLD AUTO: 0.38 THOUSAND/ΜL (ref 0.17–1.22)
MONOCYTES NFR BLD AUTO: 6 % (ref 4–12)
NEUTROPHILS # BLD AUTO: 4.24 THOUSANDS/ΜL (ref 1.85–7.62)
NEUTS SEG NFR BLD AUTO: 71 % (ref 43–75)
NRBC BLD AUTO-RTO: 0 /100 WBCS
PLATELET # BLD AUTO: 274 THOUSANDS/UL (ref 149–390)
PMV BLD AUTO: 10.7 FL (ref 8.9–12.7)
POTASSIUM SERPL-SCNC: 2.2 MMOL/L (ref 3.5–5.3)
PROT SERPL-MCNC: 7 G/DL (ref 6.4–8.2)
RBC # BLD AUTO: 4.06 MILLION/UL (ref 3.81–5.12)
SODIUM SERPL-SCNC: 139 MMOL/L (ref 136–145)
TSH SERPL DL<=0.05 MIU/L-ACNC: 1.91 UIU/ML (ref 0.36–3.74)
WBC # BLD AUTO: 6.03 THOUSAND/UL (ref 4.31–10.16)

## 2019-07-14 PROCEDURE — 36415 COLL VENOUS BLD VENIPUNCTURE: CPT | Performed by: EMERGENCY MEDICINE

## 2019-07-14 PROCEDURE — 85025 COMPLETE CBC W/AUTO DIFF WBC: CPT | Performed by: EMERGENCY MEDICINE

## 2019-07-14 PROCEDURE — 80053 COMPREHEN METABOLIC PANEL: CPT | Performed by: EMERGENCY MEDICINE

## 2019-07-14 PROCEDURE — 99285 EMERGENCY DEPT VISIT HI MDM: CPT | Performed by: EMERGENCY MEDICINE

## 2019-07-14 PROCEDURE — 96374 THER/PROPH/DIAG INJ IV PUSH: CPT

## 2019-07-14 PROCEDURE — 83690 ASSAY OF LIPASE: CPT | Performed by: EMERGENCY MEDICINE

## 2019-07-14 PROCEDURE — 99223 1ST HOSP IP/OBS HIGH 75: CPT | Performed by: FAMILY MEDICINE

## 2019-07-14 PROCEDURE — 96375 TX/PRO/DX INJ NEW DRUG ADDON: CPT

## 2019-07-14 PROCEDURE — 84443 ASSAY THYROID STIM HORMONE: CPT | Performed by: FAMILY MEDICINE

## 2019-07-14 PROCEDURE — 99285 EMERGENCY DEPT VISIT HI MDM: CPT

## 2019-07-14 PROCEDURE — 93005 ELECTROCARDIOGRAM TRACING: CPT

## 2019-07-14 RX ORDER — POTASSIUM CHLORIDE 20MEQ/15ML
60 LIQUID (ML) ORAL
Status: DISCONTINUED | OUTPATIENT
Start: 2019-07-14 | End: 2019-07-15

## 2019-07-14 RX ORDER — AMILORIDE HYDROCHLORIDE 5 MG/1
5 TABLET ORAL DAILY
Status: DISCONTINUED | OUTPATIENT
Start: 2019-07-15 | End: 2019-07-16 | Stop reason: HOSPADM

## 2019-07-14 RX ORDER — METOCLOPRAMIDE HYDROCHLORIDE 5 MG/ML
10 INJECTION INTRAMUSCULAR; INTRAVENOUS EVERY 6 HOURS PRN
Status: DISCONTINUED | OUTPATIENT
Start: 2019-07-14 | End: 2019-07-14

## 2019-07-14 RX ORDER — MECLIZINE HYDROCHLORIDE 25 MG/1
25 TABLET ORAL
Status: DISCONTINUED | OUTPATIENT
Start: 2019-07-14 | End: 2019-07-16 | Stop reason: HOSPADM

## 2019-07-14 RX ORDER — ACETAMINOPHEN 325 MG/1
650 TABLET ORAL EVERY 6 HOURS PRN
Status: DISCONTINUED | OUTPATIENT
Start: 2019-07-14 | End: 2019-07-16 | Stop reason: HOSPADM

## 2019-07-14 RX ORDER — METOCLOPRAMIDE HYDROCHLORIDE 5 MG/ML
10 INJECTION INTRAMUSCULAR; INTRAVENOUS EVERY 6 HOURS PRN
Status: DISCONTINUED | OUTPATIENT
Start: 2019-07-14 | End: 2019-07-16 | Stop reason: HOSPADM

## 2019-07-14 RX ORDER — AMITRIPTYLINE HYDROCHLORIDE 50 MG/1
50 TABLET, FILM COATED ORAL
Status: DISCONTINUED | OUTPATIENT
Start: 2019-07-14 | End: 2019-07-16 | Stop reason: HOSPADM

## 2019-07-14 RX ORDER — POTASSIUM CHLORIDE AND SODIUM CHLORIDE 900; 300 MG/100ML; MG/100ML
100 INJECTION, SOLUTION INTRAVENOUS CONTINUOUS
Status: DISCONTINUED | OUTPATIENT
Start: 2019-07-14 | End: 2019-07-15

## 2019-07-14 RX ORDER — FERROUS SULFATE 325(65) MG
325 TABLET ORAL EVERY OTHER DAY
Status: DISCONTINUED | OUTPATIENT
Start: 2019-07-14 | End: 2019-07-16 | Stop reason: HOSPADM

## 2019-07-14 RX ORDER — ONDANSETRON 2 MG/ML
4 INJECTION INTRAMUSCULAR; INTRAVENOUS EVERY 6 HOURS PRN
Status: DISCONTINUED | OUTPATIENT
Start: 2019-07-14 | End: 2019-07-16 | Stop reason: HOSPADM

## 2019-07-14 RX ORDER — ONDANSETRON 2 MG/ML
4 INJECTION INTRAMUSCULAR; INTRAVENOUS ONCE
Status: COMPLETED | OUTPATIENT
Start: 2019-07-14 | End: 2019-07-14

## 2019-07-14 RX ORDER — POTASSIUM CHLORIDE 14.9 MG/ML
40 INJECTION INTRAVENOUS ONCE
Status: COMPLETED | OUTPATIENT
Start: 2019-07-14 | End: 2019-07-14

## 2019-07-14 RX ADMIN — FERROUS SULFATE TAB 325 MG (65 MG ELEMENTAL FE) 325 MG: 325 (65 FE) TAB at 18:44

## 2019-07-14 RX ADMIN — AMITRIPTYLINE HYDROCHLORIDE 50 MG: 50 TABLET, FILM COATED ORAL at 21:41

## 2019-07-14 RX ADMIN — ONDANSETRON 4 MG: 2 INJECTION INTRAMUSCULAR; INTRAVENOUS at 15:35

## 2019-07-14 RX ADMIN — MECLIZINE HYDROCHLORIDE 25 MG: 25 TABLET ORAL at 21:41

## 2019-07-14 RX ADMIN — ONDANSETRON 4 MG: 2 INJECTION INTRAMUSCULAR; INTRAVENOUS at 21:45

## 2019-07-14 RX ADMIN — METOCLOPRAMIDE 10 MG: 5 INJECTION, SOLUTION INTRAMUSCULAR; INTRAVENOUS at 19:16

## 2019-07-14 RX ADMIN — POTASSIUM CHLORIDE 40 MEQ: 200 INJECTION, SOLUTION INTRAVENOUS at 16:22

## 2019-07-14 RX ADMIN — POTASSIUM CHLORIDE AND SODIUM CHLORIDE 100 ML/HR: 900; 300 INJECTION, SOLUTION INTRAVENOUS at 20:23

## 2019-07-14 RX ADMIN — POTASSIUM CHLORIDE 60 MEQ: 20 SOLUTION ORAL at 18:44

## 2019-07-14 NOTE — ED PROVIDER NOTES
History  Chief Complaint   Patient presents with    Dizziness     pt with history of dizziness and states it is from her potassium level  Pt was in the outpt infusion last week and was seen in the ER for a similar associated problem     Patient returns to the emergency department for complaint of dizziness, nausea and tingling in her hands starting 3 days ago  Two days ago she began dry heaving and feeling vibration in her hands  Yesterday the symptoms worsened and are now associated with a migraine-like headache  She has a h/o migraines and vertigo  She also has a h/o chronic hypokalemia for which she periodically gets potassium infusions  Her last infusion was 5 July when she was admitted for similar symptoms  She estimates that she vomited 8-9 times today, the most of it was dry heaving  She has continued to take her potassium supplements but is uncertain how much of it was absorbed  She has been unable to eat and drink today due to the symptoms  No hematochezia, melena or hematemesis  No change in symptoms w/PO intake, BM or voiding  No recent travel or similar sick contacts  Denies f/c, CP, SOB, n/v/d  12 system ROS o/w negative  History provided by:  Patient and medical records  Dizziness   Quality:  Room spinning  Severity:  Moderate  Onset quality:  Gradual  Duration:  3 days  Timing:  Constant  Progression:  Waxing and waning  Chronicity:  Recurrent  Context: inactivity    Relieved by:  Nothing  Exacerbated by: vomiting    Ineffective treatments:  Lying down, fluids and being still  Associated symptoms: headaches, nausea and vomiting    Associated symptoms: no blood in stool, no chest pain, no diarrhea, no hearing loss, no palpitations, no shortness of breath, no syncope, no vision changes and no weakness    Headaches:     Severity:  Moderate    Onset quality:  Gradual    Duration:  1 day    Timing:  Constant    Progression:  Unchanged    Chronicity:  Recurrent  Nausea: Severity:  Moderate    Onset quality:  Sudden    Duration:  3 days    Timing:  Intermittent    Progression:  Waxing and waning  Vomiting:     Quality:  Stomach contents    Number of occurrences:  Numerous daily    Severity:  Moderate    Duration:  2 days    Timing:  Intermittent    Progression:  Unchanged  Risk factors: hx of vertigo and multiple medications    Risk factors: no anemia, no heart disease, no hx of stroke, no Meniere's disease and no new medications        Prior to Admission Medications   Prescriptions Last Dose Informant Patient Reported? Taking?    AMILoride 5 mg tablet 7/14/2019 at Unknown time Self No Yes   Sig: Take 1 tablet (5 mg total) by mouth daily   PREDNISONE, CARLOS ALBERTO, PO   Yes No   Sig: Take by mouth   amitriptyline (ELAVIL) 50 mg tablet 7/13/2019 at Unknown time  Yes Yes   Sig: Take 50 mg by mouth daily at bedtime   b complex-C-folic acid (NEPHRO-EDIE) 0 8 mg tablet 7/13/2019 at Unknown time  Yes Yes   Sig: Take 0 8 mg by mouth daily with dinner   ergocalciferol (ERGOCALCIFEROL) 47245 units capsule 7/14/2019 at Unknown time  Yes Yes   Sig: Take 50,000 Units by mouth once a week Patient takes this med on Mondays    ferrous sulfate 325 (65 Fe) mg tablet 7/14/2019 at Unknown time  Yes Yes   Sig: Take 325 mg by mouth every other day    meclizine (ANTIVERT) 25 mg tablet 7/13/2019 at Unknown time  Yes Yes   Sig: Take 25 mg by mouth daily at bedtime   potassium chloride 10 % oral solution 7/14/2019 at Unknown time  No Yes   Sig: Take 60 meq PO twice a day And take 40 meq 3 times a day   thiamine 100 MG tablet 7/14/2019 at Unknown time  Yes Yes   Sig: Take 100 mg by mouth daily   zinc gluconate 50 mg tablet 7/14/2019 at Unknown time  Yes Yes   Sig: Take 50 mg by mouth daily      Facility-Administered Medications: None       Past Medical History:   Diagnosis Date    H  pylori infection     Hypokalemia     Migraine        Past Surgical History:   Procedure Laterality Date    ABDOMINAL SURGERY      APPENDECTOMY      CHOLECYSTECTOMY      COSMETIC SURGERY      FL GUIDED CENTRAL VENOUS ACCESS DEVICE INSERTION  12/21/2018    GASTRIC BYPASS      HYSTERECTOMY      TUNNELED VENOUS PORT PLACEMENT N/A 12/21/2018    Procedure: INSERTION VENOUS PORT (PORT-A-CATH); Surgeon: Cheryl Howard DO;  Location: [facility];  Service: General       Family History   Problem Relation Age of Onset    Hypertension Father     Diabetes Father     Diabetes Maternal Grandfather      I have reviewed and agree with the history as documented  Social History     Tobacco Use    Smoking status: Never Smoker    Smokeless tobacco: Never Used   Substance Use Topics    Alcohol use: Yes     Alcohol/week: 0 0 standard drinks     Frequency: Monthly or less     Drinks per session: 1 or 2     Binge frequency: Never    Drug use: No        Review of Systems   Constitutional: Negative for chills, diaphoresis, fatigue and fever  HENT: Negative for congestion, hearing loss, rhinorrhea and sore throat  Respiratory: Negative for cough, shortness of breath and wheezing  Cardiovascular: Negative for chest pain, palpitations, leg swelling and syncope  Gastrointestinal: Positive for nausea and vomiting  Negative for abdominal pain, blood in stool and diarrhea  Endocrine: Negative for polydipsia, polyphagia and polyuria  Genitourinary: Negative for dysuria, flank pain, frequency, hematuria, urgency, vaginal discharge and vaginal pain  Musculoskeletal: Negative for arthralgias, back pain and myalgias  Skin: Negative for pallor and rash  Neurological: Positive for dizziness and headaches  Negative for syncope, weakness, light-headedness and numbness  Upper extremity paresthesias     Hematological: Negative for adenopathy  Psychiatric/Behavioral: Negative for confusion  All other systems reviewed and are negative  Physical Exam  Physical Exam   Constitutional: She is oriented to person, place, and time   She appears well-developed and well-nourished  She appears distressed (Mildly)  HENT:   Head: Normocephalic and atraumatic  Right Ear: External ear normal    Left Ear: External ear normal    Nose: Nose normal    Mouth/Throat: No oropharyngeal exudate  Tacky oral mucous membranes   Eyes: Pupils are equal, round, and reactive to light  Conjunctivae and EOM are normal  No scleral icterus  Neck: Normal range of motion  Neck supple  Cardiovascular: Normal rate, regular rhythm and normal heart sounds  No murmur heard  Pulmonary/Chest: Effort normal and breath sounds normal  No respiratory distress  She has no wheezes  She has no rales  Abdominal: Soft  Bowel sounds are normal  She exhibits no distension  There is no tenderness  Musculoskeletal: Normal range of motion  She exhibits no edema or tenderness  Lymphadenopathy:     She has no cervical adenopathy  Neurological: She is alert and oriented to person, place, and time  She has normal reflexes  No sensory deficit  She exhibits normal muscle tone  Skin: Skin is warm and dry  Capillary refill takes less than 2 seconds  No rash noted  She is not diaphoretic  No erythema  No pallor  Psychiatric: She has a normal mood and affect  Her behavior is normal  Thought content normal    Vitals reviewed        Vital Signs  ED Triage Vitals   Temp Pulse Respirations Blood Pressure SpO2   -- 07/14/19 1431 07/14/19 1431 07/14/19 1431 07/14/19 1431    92 18 125/60 97 %      Temp src Heart Rate Source Patient Position - Orthostatic VS BP Location FiO2 (%)   -- 07/14/19 1630 07/14/19 1630 07/14/19 1431 --    Monitor Lying Left arm       Pain Score       07/14/19 1431       No Pain           Vitals:    07/14/19 1431 07/14/19 1630   BP: 125/60 128/58   Pulse: 92 76   Patient Position - Orthostatic VS:  Lying         Visual Acuity      ED Medications  Medications   potassium chloride 20 mEq IVPB (premix) (40 mEq Intravenous New Bag 7/14/19 1622)   ondansetron (ZOFRAN) injection 4 mg (4 mg Intravenous Given 7/14/19 1535)       Diagnostic Studies  Results Reviewed     Procedure Component Value Units Date/Time    Comprehensive metabolic panel [[id number]]  (Abnormal) Collected:  07/14/19 1534    Lab Status:  Final result Specimen:  Blood from Central Venous Line Updated:  07/14/19 1559     Sodium 139 mmol/L      Potassium 2 2 mmol/L      Chloride 105 mmol/L      CO2 24 mmol/L      ANION GAP 10 mmol/L      BUN 10 mg/dL      Creatinine 0 95 mg/dL      Glucose 87 mg/dL      Calcium 8 1 mg/dL      AST 19 U/L      ALT 26 U/L      Alkaline Phosphatase 47 U/L      Total Protein 7 0 g/dL      Albumin 3 7 g/dL      Total Bilirubin 0 40 mg/dL      eGFR 78 ml/min/1 73sq m     Narrative:       National Kidney Disease Foundation guidelines for Chronic Kidney Disease (CKD):     Stage 1 with normal or high GFR (GFR > 90 mL/min/1 73 square meters)    Stage 2 Mild CKD (GFR = 60-89 mL/min/1 73 square meters)    Stage 3A Moderate CKD (GFR = 45-59 mL/min/1 73 square meters)    Stage 3B Moderate CKD (GFR = 30-44 mL/min/1 73 square meters)    Stage 4 Severe CKD (GFR = 15-29 mL/min/1 73 square meters)    Stage 5 End Stage CKD (GFR <15 mL/min/1 73 square meters)  Note: GFR calculation is accurate only with a steady state creatinine    Lipase [[id number]]  (Normal) Collected:  07/14/19 1534    Lab Status:  Final result Specimen:  Blood from Central Venous Line Updated:  07/14/19 1552     Lipase 97 u/L     CBC and differential [[id number]] Collected:  07/14/19 1534    Lab Status:  Final result Specimen:  Blood from Central Venous Line Updated:  07/14/19 1543     WBC 6 03 Thousand/uL      RBC 4 06 Million/uL      Hemoglobin 12 6 g/dL      Hematocrit 38 1 %      MCV 94 fL      MCH 31 0 pg      MCHC 33 1 g/dL      RDW 13 6 %      MPV 10 7 fL      Platelets 729 Thousands/uL      nRBC 0 /100 WBCs      Neutrophils Relative 71 %      Immat GRANS % 0 %      Lymphocytes Relative 21 %      Monocytes Relative 6 % Eosinophils Relative 1 %      Basophils Relative 1 %      Neutrophils Absolute 4 24 Thousands/µL      Immature Grans Absolute 0 02 Thousand/uL      Lymphocytes Absolute 1 28 Thousands/µL      Monocytes Absolute 0 38 Thousand/µL      Eosinophils Absolute 0 07 Thousand/µL      Basophils Absolute 0 04 Thousands/µL     [place] draw [[id number]] Collected:  07/14/19 1534    Lab Status: In process Specimen:  Blood Updated:  07/14/19 1540    Narrative: The following orders were created for panel order [place] draw  Procedure                               Abnormality         Status                     ---------                               -----------         ------                     John Nicole Top on hold[[id number]]                           In process                 Green / Black tube on hold[[id number]]                       In process                   Please view results for these tests on the individual orders  No orders to display              Procedures  Procedures       ED Course  ED Course as of Jul 14 1644   Sun Jul 14, 2019   1615 Results reviewed  Will start K+ infusion and admit  MDM  Number of Diagnoses or Management Options  Diagnosis management comments: DDx:  Dizziness/headache/nausea/vomiting -  abnormal electrolytes, dehydration, acute renal failure, vertigo, migraine  A/P: Will check metabolic workup, treat symptoms, give IVF, reevaluate for further work up and disposition         Amount and/or Complexity of Data Reviewed  Clinical lab tests: ordered and reviewed  Review and summarize past medical records: yes        Disposition  Final diagnoses:   Headache   Nausea and vomiting   Acute hypokalemia   Paresthesias     Time reflects when diagnosis was documented in both MDM as applicable and the Disposition within this note     Time User Action Codes Description Comment    7/14/2019  4:22 PM Elizabeth Hathaway Add [R51] Headache     7/14/2019  4:22 PM Dala Mikayla Add [R11 2] Nausea and vomiting     7/14/2019  4:22 PM Dala Shoemaker Add [E87 6] Acute hypokalemia     7/14/2019  4:22 PM Dala Carlanichoker Modify [R51] Headache     7/14/2019  4:22 PM [address] [E87 6] Acute hypokalemia     7/14/2019  4:23 PM Dala Mikayla Add [R20 2] Paresthesias       ED Disposition     ED Disposition Condition Date/Time Comment    Admit Stable Sun Jul 14, 2019  4:24 PM Case was discussed with Dr Pamela Santamaria and the patient's admission status was agreed to be Admission Status: observation status to the service of Dr Pamela Santamaria    Follow-up Information    None         Patient's Medications   Discharge Prescriptions    No medications on file     No discharge procedures on file      ED Provider  Electronically Signed by           Manan Turner DO  07/14/19 4915

## 2019-07-14 NOTE — H&P
H&P- Gilda Rico 1985, 27 y o  female MRN: [id number]    Unit/Bed#: 403-01 Encounter: [id number]    Primary Care Provider: Priscila Thompson MD   Date and time admitted to hospital: 7/14/2019  2:37 PM        * Hypokalemia  Assessment & Plan  Patient with recurrent hyperkalemia and recurrent hospitalizations for this  Patient has a port and was receiving potassium infusions twice a week until recently, she was recommended to discontinue as her potassium remained in normal range for 5 weeks  Patient again with marked hypokalemia at 2 2  Continuous potassium infusion  BMP in am    Intractable nausea and vomiting  Assessment & Plan  Due to hypokalemia  Continue treatment for underlying hypokalemia  Supportive management with Zofran and Reglan    Migraine without aura and without status migrainosus, not intractable  Assessment & Plan  Continue amitriptyline  PRN Tylenol     Stress fracture of right foot with routine healing  Assessment & Plan  Patient follows with Podiatry and has a specialized boot  Recent treatment with steroids which the patient feels triggered her symptoms  Resume outpatient follow-up with Podiatry    Vertigo  Assessment & Plan  Patient on meclizine chronically, reports exacerbation of symptoms since Friday    Gastric bypass status for obesity  Assessment & Plan  With resultant poor electrolyte absorption  Patient with recurrent admissions for hypokalemia  Continue electrolyte replacement and monitor levels closely    VTE Prophylaxis: none, low risk  / reason for no mechanical VTE prophylaxis ambulate   Code Status: Full  POLST: POLST is not applicable to this patient  Discussion with family: Patient    Anticipated Length of Stay:  Patient will be admitted on an Observation basis with an anticipated length of stay of less than 2 midnights  Justification for Hospital Stay: need for IV KCl infusion    Total Time for Visit, including Counseling / Coordination of Care: 1 hour    Greater than 50% of this total time spent on direct patient counseling and coordination of care  Chief Complaint:   Intractable nausea/vomiting    History of Present Illness:    Rj Amador is a 27 y o  female with history of gastric bypass and recurrent hypokalemia who presents with intractable nausea and vomiting, vertigo and generalized weakness which started since Friday and has become progressively worse  The patient states that her regular potassium infusions were recently stopped as for 5 weeks she had maintained normal potassium levels  She states that she was recently diagnosed and treated for right foot stress fracture with a dose of steroids taken on Friday, she states that soon after the steroids she has started to develop nausea and vomiting and questions whether not her current symptoms are related to the steroid treatment  The patient also states that her chronic migraine and vertigo symptoms had been controlled for months, however, since Friday she has been having recurrent headaches and vertigo despite continue to take her chronic medications  She also reports associated generalized weakness  She does report decreased oral intake due to these symptoms  She reports looser stools however denies increased frequency of bowel movements  She denies abdominal pain  She denies chills or fevers, cough or upper respiratory symptoms, denies dysuria  She also denies chest pain, shortness of breath, palpitations or lower extremity edema  Review of Systems:    Review of Systems   Constitutional: Positive for fatigue  Negative for fever  HENT: Negative for congestion and rhinorrhea  Eyes: Negative for visual disturbance  Respiratory: Negative for shortness of breath and wheezing  Cardiovascular: Negative for chest pain, palpitations and leg swelling  Gastrointestinal: Positive for nausea and vomiting  Negative for abdominal pain  Genitourinary: Negative for dysuria     Musculoskeletal: Positive for myalgias  Negative for gait problem  Skin: Negative for rash  Neurological: Negative for dizziness  Hematological: Negative for adenopathy  Psychiatric/Behavioral: Negative for confusion  Past Medical and Surgical History:     Past Medical History:   Diagnosis Date    H  pylori infection     Hypokalemia     Migraine        Past Surgical History:   Procedure Laterality Date    ABDOMINAL SURGERY      APPENDECTOMY      CHOLECYSTECTOMY      COSMETIC SURGERY      FL GUIDED CENTRAL VENOUS ACCESS DEVICE INSERTION  12/21/2018    GASTRIC BYPASS      HYSTERECTOMY      TUNNELED VENOUS PORT PLACEMENT N/A 12/21/2018    Procedure: INSERTION VENOUS PORT (PORT-A-CATH); Surgeon: Dieudonne Curran DO;  Location: [facility];  Service: General       Meds/Allergies:    Prior to Admission medications    Medication Sig Start Date End Date Taking?  Authorizing Provider   AMILoride 5 mg tablet Take 1 tablet (5 mg total) by mouth daily 2/20/19  Yes [facility], DO   amitriptyline (ELAVIL) 50 mg tablet Take 50 mg by mouth daily at bedtime 8/27/18 8/27/19 Yes Historical Provider, MD   b complex-C-folic acid (NEPHRO-EDIE) 0 8 mg tablet Take 0 8 mg by mouth daily with dinner   Yes Historical Provider, MD   ergocalciferol (ERGOCALCIFEROL) 00462 units capsule Take 50,000 Units by mouth once a week Patient takes this med on Mondays    Yes Historical Provider, MD   ferrous sulfate 325 (65 Fe) mg tablet Take 325 mg by mouth every other day    Yes Historical Provider, MD   meclizine (ANTIVERT) 25 mg tablet Take 25 mg by mouth daily at bedtime 8/27/18  Yes Historical Provider, MD   potassium chloride 10 % oral solution Take 60 meq PO twice a day And take 40 meq 3 times a day 7/7/19  Yes Freda Eisenmenger, MD   PREDNISONE, CARLOS ALBERTO, PO Take by mouth   Yes Historical Provider, MD   thiamine 100 MG tablet Take 100 mg by mouth daily   Yes Historical Provider, MD   zinc gluconate 50 mg tablet Take 50 mg by mouth daily   Yes Historical Provider, MD     I have reviewed home medications with a medical source (PCP, Pharmacy, other)  Allergies: Allergies   Allergen Reactions    Nsaids      Other reaction(s): Other (Please comment)  Should not take s/p bariatric surgery       Social History:     Marital Status: /Civil Union   Occupation: massage therapist  Patient Pre-hospital Living Situation: with   Patient Pre-hospital Level of Mobility: independnet  Patient Pre-hospital Diet Restrictions: none  Substance Use History:   Social History     Substance and Sexual Activity   Alcohol Use Yes    Alcohol/week: 0 0 standard drinks    Frequency: Monthly or less    Drinks per session: 1 or 2    Binge frequency: Never     Social History     Tobacco Use   Smoking Status Never Smoker   Smokeless Tobacco Never Used     Social History     Substance and Sexual Activity   Drug Use No       Family History:    Family History   Problem Relation Age of Onset    Hypertension Father     Diabetes Father     Diabetes Maternal Grandfather        Physical Exam:     Vitals:   Blood Pressure: 101/62 (07/14/19 1743)  Pulse: 87 (07/14/19 1743)  Temperature: 97 9 °F (36 6 °C) (07/14/19 1743)  Temp Source: Temporal (07/14/19 1743)  Respirations: 20 (07/14/19 1743)  Height: 5' 2" (157 5 cm) (07/14/19 1743)  Weight - Scale: 70 1 kg (154 lb 8 7 oz) (07/14/19 1743)  SpO2: 99 % (07/14/19 1743)    Physical Exam   Constitutional: She is oriented to person, place, and time  No distress  HENT:   Head: Normocephalic and atraumatic  Eyes: Conjunctivae are normal    Neck: No JVD present  Cardiovascular: Normal rate and regular rhythm  No murmur heard  Pulmonary/Chest: Effort normal  No respiratory distress  She has no wheezes  She has no rales  Abdominal: Soft  She exhibits no distension  There is no tenderness  There is no guarding  Musculoskeletal: She exhibits no edema  Neurological: She is alert and oriented to person, place, and time     Skin: Skin is warm and dry  Psychiatric: She has a normal mood and affect  Additional Data:     Lab Results: I have personally reviewed pertinent reports  Results from last 7 days   Lab Units 07/14/19  1534   WBC Thousand/uL 6 03   HEMOGLOBIN g/dL 12 6   HEMATOCRIT % 38 1   PLATELETS Thousands/uL 274   NEUTROS PCT % 71   LYMPHS PCT % 21   MONOS PCT % 6   EOS PCT % 1     Results from last 7 days   Lab Units 07/14/19  1534   SODIUM mmol/L 139   POTASSIUM mmol/L 2 2*   CHLORIDE mmol/L 105   CO2 mmol/L 24   BUN mg/dL 10   CREATININE mg/dL 0 95   ANION GAP mmol/L 10   CALCIUM mg/dL 8 1*   ALBUMIN g/dL 3 7   TOTAL BILIRUBIN mg/dL 0 40   ALK PHOS U/L 47   ALT U/L 26   AST U/L 19   GLUCOSE RANDOM mg/dL 87                       Imaging: I have personally reviewed pertinent reports  No orders to display       EKG, Pathology, and Other Studies Reviewed on Admission:   · EKG: NSR    Allscripts / Epic Records Reviewed: Yes     ** Please Note: This note has been constructed using a voice recognition system   **

## 2019-07-14 NOTE — ASSESSMENT & PLAN NOTE
Patient follows with Podiatry and has a specialized boot  Recent treatment with steroids which the patient feels triggered her symptoms  Resume outpatient follow-up with Podiatry

## 2019-07-14 NOTE — ASSESSMENT & PLAN NOTE
Patient with recurrent hyperkalemia and recurrent hospitalizations for this  Patient has a port and was receiving potassium infusions twice a week until recently, she was recommended to discontinue as her potassium remained in normal range for 5 weeks  Patient again with marked hypokalemia at 2 2  Continuous potassium infusion  BMP in am

## 2019-07-14 NOTE — ED PROCEDURE NOTE
PROCEDURE  CriticalCare Time  Performed by: Ever Palafox DO  Authorized by:  Ever Palafox DO     Critical care provider statement:     Critical care time (minutes):  30    Critical care time was exclusive of:  Separately billable procedures and treating other patients    Critical care was necessary to treat or prevent imminent or life-threatening deterioration of the following conditions:  Metabolic crisis    Critical care was time spent personally by me on the following activities:  Obtaining history from patient or surrogate, development of treatment plan with patient or surrogate, evaluation of patient's response to treatment, examination of patient, ordering and performing treatments and interventions, ordering and review of laboratory studies, re-evaluation of patient's condition and review of old [facility] DO Amirah  07/14/19 0527

## 2019-07-14 NOTE — ASSESSMENT & PLAN NOTE
With resultant poor electrolyte absorption  Patient with recurrent admissions for hypokalemia  Continue electrolyte replacement and monitor levels closely

## 2019-07-14 NOTE — ED PROCEDURE NOTE
PROCEDURE  ECG 12 Lead Documentation Only  Date/Time: 7/14/2019 4:39 PM  Performed by: Silvina De La Fuente DO  Authorized by:  Silvina De La Fuente DO     Indications / Diagnosis:  Hypokalemia  ECG reviewed by me, the ED Provider: yes    Patient location:  ED  Interpretation:     Interpretation: normal    Rate:     ECG rate:  76    ECG rate assessment: normal    Rhythm:     Rhythm: sinus rhythm    Ectopy:     Ectopy: none    QRS:     QRS axis:  Normal  Conduction:     Conduction: normal    ST segments:     ST segments:  Normal  T waves:     T waves: normal           Silvina De La Fuente DO  07/14/19 1644

## 2019-07-14 NOTE — ASSESSMENT & PLAN NOTE
Due to hypokalemia  Continue treatment for underlying hypokalemia  Supportive management with Zofran and Reglan

## 2019-07-14 NOTE — PLAN OF CARE
Problem: Potential for Falls  Goal: Patient will remain free of falls  Description  INTERVENTIONS:  - Assess patient frequently for physical needs  -  Identify cognitive and physical deficits and behaviors that affect risk of falls    -  [place] fall precautions as indicated by assessment   - Educate patient/family on patient safety including physical limitations  - Instruct patient to call for assistance with activity based on assessment  - Modify environment to reduce risk of injury  - Consider OT/PT consult to assist with strengthening/mobility  Outcome: Progressing     Problem: PAIN - ADULT  Goal: Verbalizes/displays adequate comfort level or baseline comfort level  Description  Interventions:  - Encourage patient to monitor pain and request assistance  - Assess pain using appropriate pain scale  - Administer analgesics based on type and severity of pain and evaluate response  - Implement non-pharmacological measures as appropriate and evaluate response  - Consider cultural and social influences on pain and pain management  - Notify physician/advanced practitioner if interventions unsuccessful or patient reports new pain  Outcome: Progressing     Problem: INFECTION - ADULT  Goal: Absence or prevention of progression during hospitalization  Description  INTERVENTIONS:  - Assess and monitor for signs and symptoms of infection  - Monitor lab/diagnostic results  - Monitor all insertion sites, i e  indwelling lines, tubes, and drains  - Monitor endotracheal (as able) and nasal secretions for changes in amount and color  - [place] appropriate cooling/warming therapies per order  - Administer medications as ordered  - Instruct and encourage patient and family to use good hand hygiene technique  - Identify and instruct in appropriate isolation precautions for identified infection/condition  Outcome: Progressing     Problem: SAFETY ADULT  Goal: Maintain or return to baseline ADL function  Description  INTERVENTIONS:  -  Assess patient's ability to carry out ADLs; assess patient's baseline for ADL function and identify physical deficits which impact ability to perform ADLs (bathing, care of mouth/teeth, toileting, grooming, dressing, etc )  - Assess/evaluate cause of self-care deficits   - Assess range of motion  - Assess patient's mobility; develop plan if impaired  - Assess patient's need for assistive devices and provide as appropriate  - Encourage maximum independence but intervene and supervise when necessary  ¯ Involve family in performance of ADLs  ¯ Assess for home care needs following discharge   ¯ Request OT consult to assist with ADL evaluation and planning for discharge  ¯ Provide patient education as appropriate  Outcome: Progressing  Goal: Maintain or return mobility status to optimal level  Description  INTERVENTIONS:  - Assess patient's baseline mobility status (ambulation, transfers, stairs, etc )    - Identify cognitive and physical deficits and behaviors that affect mobility  - Identify mobility aids required to assist with transfers and/or ambulation (gait belt, sit-to-stand, lift, walker, cane, etc )  - [place] fall precautions as indicated by assessment  - Record patient progress and toleration of activity level on Mobility SBAR; progress patient to next Phase/Stage  - Instruct patient to call for assistance with activity based on assessment  - Request Rehabilitation consult to assist with strengthening/weightbearing, etc   Outcome: Progressing     Problem: DISCHARGE PLANNING  Goal: Discharge to home or other facility with appropriate resources  Description  INTERVENTIONS:  - Identify barriers to discharge w/patient and caregiver  - Arrange for needed discharge resources and transportation as appropriate  - Identify discharge learning needs (meds, wound care, etc )  - Arrange for interpretive services to assist at discharge as needed  - Refer to Case Management Department for coordinating discharge planning if the patient needs post-hospital services based on physician/advanced practitioner order or complex needs related to functional status, cognitive ability, or social support system  Outcome: Progressing     Problem: Knowledge Deficit  Goal: Patient/family/caregiver demonstrates understanding of disease process, treatment plan, medications, and discharge instructions  Description  Complete learning assessment and assess knowledge base    Interventions:  - Provide teaching at level of understanding  - Provide teaching via preferred learning methods  Outcome: Progressing     Problem: METABOLIC, FLUID AND ELECTROLYTES - ADULT  Goal: Electrolytes maintained within normal limits  Description  INTERVENTIONS:  - Monitor labs and assess patient for signs and symptoms of electrolyte imbalances  - Administer electrolyte replacement as ordered  - Monitor response to electrolyte replacements, including repeat lab results as appropriate  - Instruct patient on fluid and nutrition as appropriate  Outcome: Progressing

## 2019-07-15 LAB
ANION GAP SERPL CALCULATED.3IONS-SCNC: 11 MMOL/L (ref 4–13)
BASOPHILS # BLD AUTO: 0.04 THOUSANDS/ΜL (ref 0–0.1)
BASOPHILS NFR BLD AUTO: 1 % (ref 0–1)
BUN SERPL-MCNC: 12 MG/DL (ref 5–25)
CALCIUM SERPL-MCNC: 7.8 MG/DL (ref 8.3–10.1)
CHLORIDE SERPL-SCNC: 111 MMOL/L (ref 100–108)
CO2 SERPL-SCNC: 22 MMOL/L (ref 21–32)
CREAT SERPL-MCNC: 0.86 MG/DL (ref 0.6–1.3)
EOSINOPHIL # BLD AUTO: 0.16 THOUSAND/ΜL (ref 0–0.61)
EOSINOPHIL NFR BLD AUTO: 3 % (ref 0–6)
ERYTHROCYTE [DISTWIDTH] IN BLOOD BY AUTOMATED COUNT: 13.7 % (ref 11.6–15.1)
GFR SERPL CREATININE-BSD FRML MDRD: 88 ML/MIN/1.73SQ M
GLUCOSE P FAST SERPL-MCNC: 79 MG/DL (ref 65–99)
GLUCOSE SERPL-MCNC: 79 MG/DL (ref 65–140)
HCT VFR BLD AUTO: 34.2 % (ref 34.8–46.1)
HGB BLD-MCNC: 11.1 G/DL (ref 11.5–15.4)
IMM GRANULOCYTES # BLD AUTO: 0.02 THOUSAND/UL (ref 0–0.2)
IMM GRANULOCYTES NFR BLD AUTO: 0 % (ref 0–2)
LYMPHOCYTES # BLD AUTO: 2.37 THOUSANDS/ΜL (ref 0.6–4.47)
LYMPHOCYTES NFR BLD AUTO: 38 % (ref 14–44)
MAGNESIUM SERPL-MCNC: 2.3 MG/DL (ref 1.6–2.6)
MCH RBC QN AUTO: 31.1 PG (ref 26.8–34.3)
MCHC RBC AUTO-ENTMCNC: 32.5 G/DL (ref 31.4–37.4)
MCV RBC AUTO: 96 FL (ref 82–98)
MONOCYTES # BLD AUTO: 0.47 THOUSAND/ΜL (ref 0.17–1.22)
MONOCYTES NFR BLD AUTO: 8 % (ref 4–12)
NEUTROPHILS # BLD AUTO: 3.24 THOUSANDS/ΜL (ref 1.85–7.62)
NEUTS SEG NFR BLD AUTO: 50 % (ref 43–75)
NRBC BLD AUTO-RTO: 0 /100 WBCS
PLATELET # BLD AUTO: 253 THOUSANDS/UL (ref 149–390)
PMV BLD AUTO: 10.8 FL (ref 8.9–12.7)
POTASSIUM SERPL-SCNC: 2.6 MMOL/L (ref 3.5–5.3)
RBC # BLD AUTO: 3.57 MILLION/UL (ref 3.81–5.12)
SODIUM SERPL-SCNC: 144 MMOL/L (ref 136–145)
WBC # BLD AUTO: 6.3 THOUSAND/UL (ref 4.31–10.16)

## 2019-07-15 PROCEDURE — 99232 SBSQ HOSP IP/OBS MODERATE 35: CPT | Performed by: PHYSICIAN ASSISTANT

## 2019-07-15 PROCEDURE — 85025 COMPLETE CBC W/AUTO DIFF WBC: CPT | Performed by: FAMILY MEDICINE

## 2019-07-15 PROCEDURE — 83735 ASSAY OF MAGNESIUM: CPT | Performed by: FAMILY MEDICINE

## 2019-07-15 PROCEDURE — 80048 BASIC METABOLIC PNL TOTAL CA: CPT | Performed by: FAMILY MEDICINE

## 2019-07-15 RX ORDER — POTASSIUM CHLORIDE 14.9 MG/ML
20 INJECTION INTRAVENOUS
Status: COMPLETED | OUTPATIENT
Start: 2019-07-15 | End: 2019-07-15

## 2019-07-15 RX ORDER — POTASSIUM CHLORIDE 20MEQ/15ML
60 LIQUID (ML) ORAL
Status: DISCONTINUED | OUTPATIENT
Start: 2019-07-15 | End: 2019-07-16 | Stop reason: HOSPADM

## 2019-07-15 RX ADMIN — POTASSIUM CHLORIDE 20 MEQ: 200 INJECTION, SOLUTION INTRAVENOUS at 08:00

## 2019-07-15 RX ADMIN — MECLIZINE HYDROCHLORIDE 25 MG: 25 TABLET ORAL at 21:55

## 2019-07-15 RX ADMIN — POTASSIUM CHLORIDE 60 MEQ: 20 SOLUTION ORAL at 11:45

## 2019-07-15 RX ADMIN — POTASSIUM CHLORIDE 20 MEQ: 200 INJECTION, SOLUTION INTRAVENOUS at 09:44

## 2019-07-15 RX ADMIN — POTASSIUM CHLORIDE 20 MEQ: 200 INJECTION, SOLUTION INTRAVENOUS at 13:52

## 2019-07-15 RX ADMIN — POTASSIUM CHLORIDE 60 MEQ: 20 SOLUTION ORAL at 05:38

## 2019-07-15 RX ADMIN — POTASSIUM CHLORIDE 60 MEQ: 20 SOLUTION ORAL at 21:54

## 2019-07-15 RX ADMIN — AMITRIPTYLINE HYDROCHLORIDE 50 MG: 50 TABLET, FILM COATED ORAL at 21:55

## 2019-07-15 RX ADMIN — POTASSIUM CHLORIDE 20 MEQ: 200 INJECTION, SOLUTION INTRAVENOUS at 16:02

## 2019-07-15 RX ADMIN — POTASSIUM CHLORIDE 60 MEQ: 20 SOLUTION ORAL at 18:11

## 2019-07-15 RX ADMIN — B-COMPLEX W/ C & FOLIC ACID TAB 1 TABLET: TAB at 08:02

## 2019-07-15 RX ADMIN — POTASSIUM CHLORIDE AND SODIUM CHLORIDE 100 ML/HR: 900; 300 INJECTION, SOLUTION INTRAVENOUS at 06:24

## 2019-07-15 RX ADMIN — POTASSIUM CHLORIDE 20 MEQ: 200 INJECTION, SOLUTION INTRAVENOUS at 18:11

## 2019-07-15 RX ADMIN — POTASSIUM CHLORIDE 60 MEQ: 20 SOLUTION ORAL at 13:52

## 2019-07-15 NOTE — ASSESSMENT & PLAN NOTE
Patient with recurrent hyperkalemia and recurrent hospitalizations for this  Patient has a port and was receiving potassium infusions twice a week until recently, she was recommended to discontinue as her potassium remained in normal range for 5 weeks  Patient again with marked hypokalemia at 2 2 on admission  Potassium level today: 2 6  Nephrology increased the patient's potassium supplementation from 40 mEq 3 times a day up to 60 mEq 5 times a day, and add an additional 60 mEq IV to this morning's already ordered 40 mEq     BMP in am

## 2019-07-15 NOTE — UTILIZATION REVIEW
Initial Clinical Review    Admission: Date/Time/Statement: 7/15/19 @ 0719     Orders Placed This Encounter   Procedures    Inpatient Admission     Standing Status:   Standing     Number of Occurrences:   1     Order Specific Question:   Admitting Physician     Answer:   Micki Hi [[id number]]     Order Specific Question:   Level of Care     Answer:   Med Surg [[id number]]     Order Specific Question:   Estimated length of stay     Answer:   More than 2 Midnights     Order Specific Question:   Certification     Answer:   I certify that inpatient services are medically necessary for this patient for a duration of greater than two midnights  See H&P and MD Progress Notes for additional information about the patient's course of treatment  ED Arrival Information     Expected Arrival Acuity Means of Arrival Escorted By Service Admission Type    - 7/14/2019 14:26 Urgent Walk-In Self General Medicine Urgent    Arrival Complaint    -        Chief Complaint   Patient presents with    Dizziness     pt with history of dizziness and states it is from her potassium level  Pt was in the outpt infusion last week and was seen in the ER for a similar associated problem     Assessment/Plan: 27 y o  female with h/o gastric bypass and recurrent hypokalemia, migraines  who presents to ED with intractable N/V, vertigo and generalized weakness which x 2 days  and has become progressively worse  The patient states that her regular potassium infusions were recently stopped, and for ~ 5 weeks has maintained normal potassium levels  Was recently tx'd for right foot stress fx with steroid injection  Also states since Friday has been having recurrent headaches and vertigo despite taking her chronic meds  Also reports decreased oral intake  In ED K 2 2  Admit to M/S/Tele unit  Under inpatient status for Hypokalemia / intractable N/V / Migraine    K IV, monitor BMP in am, zofran/ reglan prn, IVF, analgesics prn, nephrology consult    Nephrology consult 7/15 -- increase Kcl suppl from 40 mEq 3x/day to 60 mEq 5x day  Add additional 60 mEq IV to this am's already ordered 40 mEq  Continue to monitor closely  Check phosphorus level        ED Triage Vitals   Temperature Pulse Respirations Blood Pressure SpO2   07/14/19 1431 07/14/19 1431 07/14/19 1431 07/14/19 1431 07/14/19 1431   98 1 °F (36 7 °C) 92 18 125/60 97 %      Temp Source Heart Rate Source Patient Position - Orthostatic VS BP Location FiO2 (%)   07/14/19 1431 07/14/19 1630 07/14/19 1630 07/14/19 1431 --   Temporal Monitor Lying Left arm       Pain Score       07/14/19 1431       No Pain        Wt Readings from Last 1 Encounters:   07/15/19 70 8 kg (156 lb 1 4 oz)     Additional Vital Signs:   Date/Time  Temp  Pulse  Resp  BP  MAP (mmHg)  SpO2  O2 Device   07/15/19 0801              None (Room air)   07/15/19 0703  96 3 °F (35 7 °C)Abnormal   74  20  105/53    100 %  None (Room air)   07/15/19 0545        94/64         07/15/19 0535        84/58Abnormal          07/14/19 2225  97 7 °F (36 5 °C)  64  17  92/58    98 %  None (Room air)   07/14/19 1930              None (Room air)   07/14/19 1743  97 9 °F (36 6 °C)  87  20  101/62  82  99 %  None (Room air)   07/14/19 1700    80  17  122/81    100 %  None (Room air)   07/14/19 1630    76  19  128/58    100 %  None (Room air)   07/14/19 1431  98 1 °F (36 7 °C)  92  18  125/60    97 %  None (Room air)       Pertinent Labs/Diagnostic Test Results:   Results from last 7 days   Lab Units 07/15/19  0456 07/14/19  1534   WBC Thousand/uL 6 30 6 03   HEMOGLOBIN g/dL 11 1* 12 6   HEMATOCRIT % 34 2* 38 1   PLATELETS Thousands/uL 253 274   NEUTROS ABS Thousands/µL 3 24 4 24     Results from last 7 days   Lab Units 07/15/19  0456 07/14/19  1534   SODIUM mmol/L 144 139   POTASSIUM mmol/L 2 6* 2 2*   CHLORIDE mmol/L 111* 105   CO2 mmol/L 22 24   ANION GAP mmol/L 11 10   BUN mg/dL 12 10   CREATININE mg/dL 0 86 0 95   EGFR ml/min/1 73sq m 88 78   CALCIUM mg/dL 7 8* 8 1*   MAGNESIUM mg/dL 2 3  --      Results from last 7 days   Lab Units 07/14/19  1534   AST U/L 19   ALT U/L 26   ALK PHOS U/L 47   TOTAL PROTEIN g/dL 7 0   ALBUMIN g/dL 3 7   TOTAL BILIRUBIN mg/dL 0 40     Results from last 7 days   Lab Units 07/15/19  0456 07/14/19  1534   GLUCOSE RANDOM mg/dL 79 87     Results from last 7 days   Lab Units 07/14/19  1534   LIPASE u/L 97     ED Treatment:   Medication Administration from 07/14/2019 1426 to 07/14/2019 1731       Date/Time Order Dose Route Action     07/14/2019 1535 ondansetron (ZOFRAN) injection 4 mg 4 mg Intravenous Given     07/14/2019 1622 potassium chloride 20 mEq IVPB (premix) 40 mEq Intravenous New Bag     Past Medical History:   Diagnosis Date    H  pylori infection     Hypokalemia     Migraine      Present on Admission:   GERD (gastroesophageal reflux disease)   Migraine without aura and without status migrainosus, not intractable   Hypokalemia      Admitting Diagnosis: Acute hypokalemia [E87 6]  Dizziness [R42]  Paresthesias [R20 2]  Nausea and vomiting [R11 2]  Headache [R51]  Age/Sex: 27 y o  female  Admission Orders:    Current Facility-Administered Medications:  acetaminophen 650 mg Oral Q6H PRN   AMILoride 5 mg Oral Daily   amitriptyline 50 mg Oral HS   ferrous sulfate 325 mg Oral Every Other Day   meclizine 25 mg Oral HS   metoclopramide 10 mg Intravenous Q6H PRN 7/14 x1   multivitamin stress formula 1 tablet Oral Daily   ondansetron 4 mg Intravenous Q6H PRN 7/14 x1   potassium chloride 60 mEq Oral 5x Daily   potassium chloride 20 mEq Intravenous Q2H   potassium chloride 20 mEq Intravenous Q2H       IP CONSULT TO NEPHROLOGY  IP CONSULT TO CASE MANAGEMENT  IP CONSULT TO CASE MANAGEMENT    Network Utilization Review Department  Phone: [phone]; Fax [phone]  [email or web address]  ATTENTION: Please call with any questions or concerns to [phone]  and carefully listen to the prompts so that you are directed to the right person  Send all requests for admission clinical reviews, approved or denied determinations and any other requests to fax [phone]   All voicemails are confidential

## 2019-07-15 NOTE — CONSULTS
Consultation - Nephrology   Lynda Mae 27 y o  female MRN: [id number]  Unit/Bed#: 403-01 Encounter: [id number]      A/P:  1  Hypokalemia   I will increase the patient's or potassium supplementation from 40 mEq 3 times a day up to 60 mEq 5 times a day, I will add an additional 60 mEq IV to this morning's already ordered 40 mEq  Continue monitor patient's potassium closely  2  History of hypophosphatemia   Will add phosphorus level to this morning's blood work to ensure is appropriate  Replete as indicated  3  Gastric bypass surgery  4  Nausea-vomiting-diarrhea   Unclear if this is due to prednisone versus antibiotics the patient was given  5  Lateral foot abnormality   The patient's right lateral foot that was the issue appears to be appropriate with no induration or edema  6  Migraine          Thank you for allowing us to participate in the care of your patient  Please feel free to contact us regarding the care of this patient, or any other questions/concerns that may be applicable      Patient Active Problem List   Diagnosis    Hypokalemia    History of gastric bypass    Migraine without aura and without status migrainosus, not intractable    Left-sided weakness    Hypophosphatemia    Anemia    Vitamin D deficiency    Weakness of both lower extremities    Weakness of both upper extremities    Elevated CPK    Vitamin B12 deficiency    Cervical lymphadenopathy    Generalized weakness    Paresthesia of both lower extremities    Paresthesias    B12 deficiency    Gastric bypass status for obesity    GERD (gastroesophageal reflux disease)    Migraine    WAGNER (obstructive sleep apnea)    Other intestinal malabsorption    Acute kidney injury ([address] )    Right ovarian cyst    Chest pain    Intractable nausea and vomiting    Vertigo    Stress fracture of right foot with routine healing       History of Present Illness   Physician Requesting Consult: Chantal Melo MD  Reason for Consult / Principal Problem:  Hypokalemia  Hx and PE limited by:   HPI: Melinda Bond is a 27y o  year old female who presented to the emergency department with several day history of nausea vomiting and diarrhea cannot read have a potassium level 2 2 mEq/L  Patient has known hypokalemia and at baseline, typically takes 40 mEq of liquid potassium chloride 3 times a day and 60 mEq of liquid potassium chloride to other times a day for total of 5 times a day potassium oral intake  Patient claims she was doing well up until Thursday of last week, which is July 11th  She saw her podiatrist injected her foot with a steroid, there was a significant local reaction for which she was treated with an antibiotic as well as oral prednisone  By Friday evening the patient began feeling the nauseated, began having a status of emesis followed by diarrhea Saturday  Patient's creatinine only drifted up to approximately 0 95 mg/dL at the time presentation  However, she was not able to take medications as prescribed prior to presentation  She denies any use of nonsteroidal anti-inflammatory medications  Total dose of prednisone ingested was a 1 time dose of 60 mg on Friday July 12th  Patient is feeling better since presentation, potassium is down to 2 6 mg/L, no other significant electrolyte abnormalities at this time  Patient is eating and drinking with no nausea vomiting or diarrhea in last 10-12 hours  History obtained from chart review and the patient    Review of Systems - Negative except as mentioned above in HPI, more specifics as mentioned below    Review of Systems - General ROS: positive for  - fatigue  Psychological ROS: negative  Ophthalmic ROS: negative  ENT ROS: negative  Allergy and Immunology ROS: negative  Hematological and Lymphatic ROS: negative  Endocrine ROS: negative  Respiratory ROS: no cough, shortness of breath, or wheezing  Cardiovascular ROS: no chest pain or dyspnea on exertion  Gastrointestinal ROS:  As mentioned above  Genito-Urinary ROS: no dysuria, trouble voiding, or hematuria  Musculoskeletal ROS: positive for - muscular weakness  Neurological ROS:  Positive for generalized muscle weakness as well as mild lip paresthesias  Dermatological ROS: negative    Historical Information   Past Medical History:   Diagnosis Date    H  pylori infection     Hypokalemia     Migraine      Past Surgical History:   Procedure Laterality Date    ABDOMINAL SURGERY      APPENDECTOMY      CHOLECYSTECTOMY      COSMETIC SURGERY      FL GUIDED CENTRAL VENOUS ACCESS DEVICE INSERTION  12/21/2018    GASTRIC BYPASS      HYSTERECTOMY      TUNNELED VENOUS PORT PLACEMENT N/A 12/21/2018    Procedure: INSERTION VENOUS PORT (PORT-A-CATH);   Surgeon: Kaya Ojeda DO;  Location: [facility];  Service: General     Social History   Social History     Substance and Sexual Activity   Alcohol Use Yes    Alcohol/week: 0 0 standard drinks    Frequency: Monthly or less    Drinks per session: 1 or 2    Binge frequency: Never     Social History     Substance and Sexual Activity   Drug Use No     Social History     Tobacco Use   Smoking Status Never Smoker   Smokeless Tobacco Never Used     Family History   Problem Relation Age of Onset    Hypertension Father     Diabetes Father     Diabetes Maternal Grandfather        Meds/Allergies   all current active meds have been reviewed, current meds:   Current Facility-Administered Medications   Medication Dose Route Frequency    acetaminophen (TYLENOL) tablet 650 mg  650 mg Oral Q6H PRN    AMILoride tablet 5 mg  5 mg Oral Daily    amitriptyline (ELAVIL) tablet 50 mg  50 mg Oral HS    ferrous sulfate tablet 325 mg  325 mg Oral Every Other Day    meclizine (ANTIVERT) tablet 25 mg  25 mg Oral HS    metoclopramide (REGLAN) injection 10 mg  10 mg Intravenous Q6H PRN    multivitamin stress formula tablet 1 tablet  1 tablet Oral Daily    ondansetron (ZOFRAN) injection 4 mg  4 mg Intravenous Q6H PRN    potassium chloride 10 % oral solution 60 mEq  60 mEq Oral 5x Daily    potassium chloride 20 mEq IVPB (premix)  20 mEq Intravenous Q2H    potassium chloride 20 mEq IVPB (premix)  20 mEq Intravenous Q2H    sodium chloride 0 9 % with KCl 40 mEq/L infusion (premix)  100 mL/hr Intravenous Continuous    and PTA meds:    Medications Prior to Admission   Medication    AMILoride 5 mg tablet    amitriptyline (ELAVIL) 50 mg tablet    b complex-C-folic acid (NEPHRO-EDIE) 0 8 mg tablet    ergocalciferol (ERGOCALCIFEROL) 69712 units capsule    ferrous sulfate 325 (65 Fe) mg tablet    meclizine (ANTIVERT) 25 mg tablet    potassium chloride 10 % oral solution    PREDNISONE, CARLOS ALBERTO, PO    thiamine 100 MG tablet    zinc gluconate 50 mg tablet         Allergies   Allergen Reactions    Nsaids      Other reaction(s): Other (Please comment)  Should not take s/p bariatric surgery       Objective     Intake/Output Summary (Last 24 hours) at 7/15/2019 0849  Last data filed at 7/15/2019 0827  Gross per 24 hour   Intake 1100 ml   Output    Net 1100 ml       Invasive Devices:        Physical Exam      I/O last 3 completed shifts: In: 980 [I V :980]  Out: -     Vitals:    07/15/19 0703   BP: 105/53   Pulse: 74   Resp: 20   Temp: (!) 96 3 °F (35 7 °C)   SpO2: 100%       Gen: in NAD, Alert/Awake  HEENT: no sclerous icterus, MMM, neck supple  CV: +S1/S2, RRR  Lungs: CTA bilaterally  Abd: +BS, soft NT/ND  Ext: all four extremities are warm  Skin: no rashes noted  Neuro: CN II-XII intact    Current Weight: Weight - Scale: 70 8 kg (156 lb 1 4 oz)  First Weight: Weight - Scale: 71 3 kg (157 lb 3 oz)    Lab Results:  I have personally reviewed pertinent labs      CBC:   Lab Results   Component Value Date    WBC 6 30 07/15/2019    HGB 11 1 (L) 07/15/2019    HCT 34 2 (L) 07/15/2019    MCV 96 07/15/2019     07/15/2019    MCH 31 1 07/15/2019    MCHC 32 5 07/15/2019    RDW 13 7 07/15/2019    MPV 10 8 07/15/2019    NRBC 0 07/15/2019     CMP:   Lab Results   Component Value Date    K 2 6 (LL) 07/15/2019     (H) 07/15/2019    CO2 22 07/15/2019    BUN 12 07/15/2019    CREATININE 0 86 07/15/2019    CALCIUM 7 8 (L) 07/15/2019    AST 19 07/14/2019    ALT 26 07/14/2019    ALKPHOS 47 07/14/2019    EGFR 88 07/15/2019     Phosphorus: No results found for: PHOS  Magnesium:   Lab Results   Component Value Date    MG 2 3 07/15/2019     Urinalysis: No results found for: Inetta Hench, SPECGRAV, PHUR, LEUKOCYTESUR, NITRITE, PROTEINUA, GLUCOSEU, KETONESU, BILIRUBINUR, BLOODU  Ionized Calcium: No results found for: CAION  Coagulation: No results found for: PT, INR, APTT  Troponin: No results found for: TROPONINI  ABG: No results found for: PHART, SPH6YLZ, PO2ART, VJX8QNP, T8XXXACV, BEART, SOURCE    Results from last 7 days   Lab Units 07/15/19  0456 07/14/19  1534   POTASSIUM mmol/L 2 6* 2 2*   CHLORIDE mmol/L 111* 105   CO2 mmol/L 22 24   BUN mg/dL 12 10   CREATININE mg/dL 0 86 0 95   CALCIUM mg/dL 7 8* 8 1*   ALK PHOS U/L  --  47   ALT U/L  --  26   AST U/L  --  19       Radiology review:  No results found          [employer], DO

## 2019-07-15 NOTE — PLAN OF CARE
Problem: Potential for Falls  Goal: Patient will remain free of falls  Description  INTERVENTIONS:  - Assess patient frequently for physical needs  -  Identify cognitive and physical deficits and behaviors that affect risk of falls  -  [place] fall precautions as indicated by assessment   Medium fall risk    - Educate patient/family on patient safety including physical limitations  - Instruct patient to call for assistance with activity based on assessment  - Modify environment to reduce risk of injury  - Consider OT/PT consult to assist with strengthening/mobility   7/15/2019 0850 by Yana Linder RN  Outcome: Progressing  7/15/2019 0848 by Yana Linder RN  Outcome: Progressing     Problem: PAIN - ADULT  Goal: Verbalizes/displays adequate comfort level or baseline comfort level  Description  Interventions:  - Encourage patient to monitor pain and request assistance  - Assess pain using appropriate pain scale  - Administer analgesics based on type and severity of pain and evaluate response  - Implement non-pharmacological measures as appropriate and evaluate response  - Consider cultural and social influences on pain and pain management  - Notify physician/advanced practitioner if interventions unsuccessful or patient reports new pain  7/15/2019 0850 by Yana Linder RN  Outcome: Progressing  7/15/2019 0848 by Yana Linder RN  Outcome: Progressing     Problem: INFECTION - ADULT  Goal: Absence or prevention of progression during hospitalization  Description  INTERVENTIONS:  - Assess and monitor for signs and symptoms of infection  - Monitor lab/diagnostic results  - Monitor all insertion sites, i e  indwelling lines, tubes, and drains  - Administer medications as ordered  - Instruct and encourage patient and family to use good hand hygiene technique  - Identify and instruct in appropriate isolation precautions for identified infection/condition   7/15/2019 0850 by Yana Linder RN  Outcome: Progressing  7/15/2019 0848 by Lor Finley RN  Outcome: Progressing     Problem: SAFETY ADULT  Goal: Maintain or return to baseline ADL function  Description  INTERVENTIONS:  -  Assess patient's ability to carry out ADLs; assess patient's baseline for ADL function and identify physical deficits which impact ability to perform ADLs (bathing, care of mouth/teeth, toileting, grooming, dressing, etc )  - Assess/evaluate cause of self-care deficits   - Assess range of motion  - Assess patient's mobility; develop plan if impaired  - Assess patient's need for assistive devices and provide as appropriate  - Encourage maximum independence but intervene and supervise when necessary  ¯ Involve family in performance of ADLs  ¯ Assess for home care needs following discharge   ¯ Request OT consult to assist with ADL evaluation and planning for discharge  ¯ Provide patient education as appropriate  7/15/2019 0850 by Lor Finley RN  Outcome: Progressing  7/15/2019 0848 by Lor Finley RN  Outcome: Progressing  Goal: Maintain or return mobility status to optimal level  Description  INTERVENTIONS:  - Assess patient's baseline mobility status (ambulation, transfers, stairs, etc )    - Identify cognitive and physical deficits and behaviors that affect mobility  - Identify mobility aids required to assist with transfers and/or ambulation (gait belt, sit-to-stand, lift, walker, cane, etc )  - [place] fall precautions as indicated by assessment  - Record patient progress and toleration of activity level on Mobility SBAR; progress patient to next Phase/Stage  - Instruct patient to call for assistance with activity based on assessment  - Request Rehabilitation consult to assist with strengthening/weightbearing, etc   7/15/2019 0850 by Lor Finley RN  Outcome: Progressing  7/15/2019 0848 by Lor Finley RN  Outcome: Progressing     Problem: DISCHARGE PLANNING  Goal: Discharge to home or other facility with appropriate resources  Description  INTERVENTIONS:  - Identify barriers to discharge w/patient and caregiver  - Arrange for needed discharge resources and transportation as appropriate  - Identify discharge learning needs (meds, wound care, etc )  - Arrange for interpretive services to assist at discharge as needed  - Refer to Case Management Department for coordinating discharge planning if the patient needs post-hospital services based on physician/advanced practitioner order or complex needs related to functional status, cognitive ability, or social support system  7/15/2019 0850 by Janet Segundo RN  Outcome: Progressing  7/15/2019 0848 by Janet Segundo RN  Outcome: Progressing     Problem: Knowledge Deficit  Goal: Patient/family/caregiver demonstrates understanding of disease process, treatment plan, medications, and discharge instructions  Description  Complete learning assessment and assess knowledge base    Interventions:  - Provide teaching at level of understanding  - Provide teaching via preferred learning methods  7/15/2019 0850 by Janet Segundo RN  Outcome: Progressing  7/15/2019 0848 by Janet Segundo RN  Outcome: Progressing     Problem: METABOLIC, FLUID AND ELECTROLYTES - ADULT  Goal: Electrolytes maintained within normal limits  Description  INTERVENTIONS:  - Monitor labs and assess patient for signs and symptoms of electrolyte imbalances  - Administer electrolyte replacement as ordered  - Monitor response to electrolyte replacements, including repeat lab results as appropriate  - Instruct patient on fluid and nutrition as appropriate  7/15/2019 0850 by Janet Segundo RN  Outcome: Progressing  7/15/2019 0848 by Janet Segundo RN  Outcome: Progressing     Problem: GASTROINTESTINAL - ADULT  Goal: Minimal or absence of nausea and/or vomiting  Description  INTERVENTIONS:  - Administer IV fluids as ordered to ensure adequate hydration  - Maintain NPO status until nausea and vomiting are resolved  - Nasogastric tube as ordered  - Administer ordered antiemetic medications as needed  - Provide nonpharmacologic comfort measures as appropriate  - Advance diet as tolerated, if ordered  - Nutrition services referral to assist patient with adequate nutrition and appropriate food choices  7/15/2019 0850 by Solo Bacon RN  Outcome: Progressing  7/15/2019 0848 by Solo Bacon RN  Outcome: Progressing     Problem: HEMATOLOGIC - ADULT  Goal: Maintains hematologic stability  Description  INTERVENTIONS  - Assess for signs and symptoms of bleeding or hemorrhage  - Monitor labs  - Administer supportive blood products/factors as ordered and appropriate  7/15/2019 0850 by Solo Bacon RN  Outcome: Progressing  7/15/2019 0848 by Solo Bacon RN  Outcome: Progressing     Problem: MUSCULOSKELETAL - ADULT  Goal: Maintain or return mobility to safest level of function  Description  INTERVENTIONS:  - Assess patient's ability to carry out ADLs; assess patient's baseline for ADL function and identify physical deficits which impact ability to perform ADLs (bathing, care of mouth/teeth, toileting, grooming, dressing, etc )  - Assess/evaluate cause of self-care deficits   - Assess range of motion  - Assess patient's mobility; develop plan if impaired  - Assess patient's need for assistive devices and provide as appropriate  - Encourage maximum independence but intervene and supervise when necessary  - Involve family in performance of ADLs  - Assess for home care needs following discharge   - Request OT consult to assist with ADL evaluation and planning for discharge  - Provide patient education as appropriate  7/15/2019 0850 by Solo Bacon RN  Outcome: Progressing  7/15/2019 0848 by Solo Bacon RN  Outcome: Progressing  Goal: Maintain proper alignment of affected body part  Description  INTERVENTIONS:  - Support, maintain and protect limb and body alignment  - Provide pt/fam with appropriate education  7/15/2019 0850 by Alvin Hernandez RN  Outcome: Progressing  7/15/2019 0848 by Alvin Hernandez RN  Outcome: Progressing

## 2019-07-15 NOTE — PROGRESS NOTES
Progress Note - Manoj Lynn 1985, 27 y o  female MRN: [id number]    Unit/Bed#: 403-01 Encounter: [id number]    Primary Care Provider: Rhonda Marquez MD   Date and time admitted to hospital: 7/14/2019  2:37 PM        * Hypokalemia  Assessment & Plan  Patient with recurrent hyperkalemia and recurrent hospitalizations for this  Patient has a port and was receiving potassium infusions twice a week until recently, she was recommended to discontinue as her potassium remained in normal range for 5 weeks  Patient again with marked hypokalemia at 2 2 on admission  Potassium level today: 2 6  Nephrology increased the patient's potassium supplementation from 40 mEq 3 times a day up to 60 mEq 5 times a day, and add an additional 60 mEq IV to this morning's already ordered 40 mEq  BMP in am    Intractable nausea and vomiting  Assessment & Plan  Due to hypokalemia  Continue treatment for underlying hypokalemia  Supportive management with Zofran and Reglan    Migraine without aura and without status migrainosus, not intractable  Assessment & Plan  Continue amitriptyline  PRN Tylenol     Stress fracture of right foot with routine healing  Assessment & Plan  Patient follows with Podiatry and has a specialized boot  Recent treatment with steroids which the patient feels triggered her symptoms  Resume outpatient follow-up with Podiatry    Vertigo  Assessment & Plan  Patient on meclizine chronically, reports exacerbation of symptoms since Friday    Gastric bypass status for obesity  Assessment & Plan  With resultant poor electrolyte absorption  Patient with recurrent admissions for hypokalemia  Continue electrolyte replacement and monitor levels closely      VTE Pharmacologic Prophylaxis:   Pharmacologic: low risk/encourage ambulation  Mechanical VTE Prophylaxis in Place: Yes    Patient Centered Rounds: I have performed bedside rounds with nursing staff today      Discussions with Specialists or Other Care Team Provider: nursing, CM, and attending      Time Spent for Care: 30 minutes  More than 50% of total time spent on counseling and coordination of care as described above  Current Length of Stay: 0 day(s)    Current Patient Status: Inpatient   Certification Statement: The patient, admitted on an observation basis, will now require > 2 midnight hospital stay due to continued need for IV potassium supplementation and monitoring of labs for persisentent hypokalemia  Discharge Plan: Discharge home when potassium level is within normal limits  Code Status: Level 1 - Full Code      Subjective: The patient was seen and examined  The patient states she is tolerating a regular diet today  She states she is feeling better than yesterday  Objective:     Vitals:   Temp (24hrs), Av 5 °F (36 4 °C), Min:96 3 °F (35 7 °C), Max:98 1 °F (36 7 °C)    Temp:  [96 3 °F (35 7 °C)-98 1 °F (36 7 °C)] 96 3 °F (35 7 °C)  HR:  [64-92] 74  Resp:  [17-20] 20  BP: ()/(53-81) 105/53  SpO2:  [97 %-100 %] 100 %  Body mass index is 28 55 kg/m²  Input and Output Summary (last 24 hours): Intake/Output Summary (Last 24 hours) at 7/15/2019 1022  Last data filed at 7/15/2019 0827  Gross per 24 hour   Intake 1100 ml   Output    Net 1100 ml       Physical Exam:     Physical Exam   Constitutional: She is oriented to person, place, and time  Vital signs are normal  She appears well-developed and well-nourished  She is active and cooperative  Cardiovascular: Normal rate, regular rhythm and normal heart sounds  Pulmonary/Chest: Effort normal and breath sounds normal  She has no wheezes  She has no rhonchi  She has no rales  Abdominal: Soft  Normal appearance and bowel sounds are normal  There is no tenderness  Neurological: She is alert and oriented to person, place, and time  No cranial nerve deficit  Skin: Skin is warm, dry and intact  Nursing note and vitals reviewed        Additional Data:     Labs:    Results from last 7 days   Lab Units 07/15/19  0456   WBC Thousand/uL 6 30   HEMOGLOBIN g/dL 11 1*   HEMATOCRIT % 34 2*   PLATELETS Thousands/uL 253   NEUTROS PCT % 50   LYMPHS PCT % 38   MONOS PCT % 8   EOS PCT % 3     Results from last 7 days   Lab Units 07/15/19  0456 07/14/19  1534   SODIUM mmol/L 144 139   POTASSIUM mmol/L 2 6* 2 2*   CHLORIDE mmol/L 111* 105   CO2 mmol/L 22 24   BUN mg/dL 12 10   CREATININE mg/dL 0 86 0 95   ANION GAP mmol/L 11 10   CALCIUM mg/dL 7 8* 8 1*   ALBUMIN g/dL  --  3 7   TOTAL BILIRUBIN mg/dL  --  0 40   ALK PHOS U/L  --  47   ALT U/L  --  26   AST U/L  --  19   GLUCOSE RANDOM mg/dL 79 87                           * I Have Reviewed All Lab Data Listed Above  * Additional Pertinent Lab Tests Reviewed: All Labs Within Last 24 Hours Reviewed    Imaging:    Imaging Reports Reviewed Today Include: none  Imaging Personally Reviewed by Myself Includes:  none    Recent Cultures (last 7 days):           Last 24 Hours Medication List:     Current Facility-Administered Medications:  acetaminophen 650 mg Oral Q6H PRN Nate Christianson MD    AMILoride 5 mg Oral Daily Nate Christianson MD    amitriptyline 50 mg Oral HS Nate Christianson MD    ferrous sulfate 325 mg Oral Every Other Day Nate Christianson MD    meclizine 25 mg Oral HS Nadja Pollard MD    metoclopramide 10 mg Intravenous Q6H PRN Nate Christianson MD    multivitamin stress formula 1 tablet Oral Daily Nadja Pollard MD    ondansetron 4 mg Intravenous Q6H PRN Nate Christianson MD    potassium chloride 60 mEq Oral 5x Daily Rachael Shah DO    potassium chloride 20 mEq Intravenous Q2H Nate Christianson MD Last Rate: 20 mEq (07/15/19 0944)   potassium chloride 20 mEq Intravenous Q2H Rachael Shha DO         Today, Patient Was Seen By: Bharathi Nguyen PA-C    ** Please Note: Dictation voice to text software may have been used in the creation of this document   **

## 2019-07-15 NOTE — PLAN OF CARE
Problem: Potential for Falls  Goal: Patient will remain free of falls  Description  INTERVENTIONS:  - Assess patient frequently for physical needs  -  Identify cognitive and physical deficits and behaviors that affect risk of falls  -  [place] fall precautions as indicated by assessment   Medium fall risk    - Educate patient/family on patient safety including physical limitations  - Instruct patient to call for assistance with activity based on assessment  - Modify environment to reduce risk of injury  - Consider OT/PT consult to assist with strengthening/mobility   Outcome: Progressing     Problem: PAIN - ADULT  Goal: Verbalizes/displays adequate comfort level or baseline comfort level  Description  Interventions:  - Encourage patient to monitor pain and request assistance  - Assess pain using appropriate pain scale  - Administer analgesics based on type and severity of pain and evaluate response  - Implement non-pharmacological measures as appropriate and evaluate response  - Consider cultural and social influences on pain and pain management  - Notify physician/advanced practitioner if interventions unsuccessful or patient reports new pain  Outcome: Progressing     Problem: INFECTION - ADULT  Goal: Absence or prevention of progression during hospitalization  Description  INTERVENTIONS:  - Assess and monitor for signs and symptoms of infection  - Monitor lab/diagnostic results  - Monitor all insertion sites, i e  indwelling lines, tubes, and drains  - Administer medications as ordered  - Instruct and encourage patient and family to use good hand hygiene technique  - Identify and instruct in appropriate isolation precautions for identified infection/condition   Outcome: Progressing     Problem: SAFETY ADULT  Goal: Maintain or return to baseline ADL function  Description  INTERVENTIONS:  -  Assess patient's ability to carry out ADLs; assess patient's baseline for ADL function and identify physical deficits which impact ability to perform ADLs (bathing, care of mouth/teeth, toileting, grooming, dressing, etc )  - Assess/evaluate cause of self-care deficits   - Assess range of motion  - Assess patient's mobility; develop plan if impaired  - Assess patient's need for assistive devices and provide as appropriate  - Encourage maximum independence but intervene and supervise when necessary  ¯ Involve family in performance of ADLs  ¯ Assess for home care needs following discharge   ¯ Request OT consult to assist with ADL evaluation and planning for discharge  ¯ Provide patient education as appropriate  Outcome: Progressing  Goal: Maintain or return mobility status to optimal level  Description  INTERVENTIONS:  - Assess patient's baseline mobility status (ambulation, transfers, stairs, etc )    - Identify cognitive and physical deficits and behaviors that affect mobility  - Identify mobility aids required to assist with transfers and/or ambulation (gait belt, sit-to-stand, lift, walker, cane, etc )  - [place] fall precautions as indicated by assessment  - Record patient progress and toleration of activity level on Mobility SBAR; progress patient to next Phase/Stage  - Instruct patient to call for assistance with activity based on assessment  - Request Rehabilitation consult to assist with strengthening/weightbearing, etc   Outcome: Progressing     Problem: DISCHARGE PLANNING  Goal: Discharge to home or other facility with appropriate resources  Description  INTERVENTIONS:  - Identify barriers to discharge w/patient and caregiver  - Arrange for needed discharge resources and transportation as appropriate  - Identify discharge learning needs (meds, wound care, etc )  - Arrange for interpretive services to assist at discharge as needed  - Refer to Case Management Department for coordinating discharge planning if the patient needs post-hospital services based on physician/advanced practitioner order or complex needs related to functional status, cognitive ability, or social support system  Outcome: Progressing     Problem: Knowledge Deficit  Goal: Patient/family/caregiver demonstrates understanding of disease process, treatment plan, medications, and discharge instructions  Description  Complete learning assessment and assess knowledge base    Interventions:  - Provide teaching at level of understanding  - Provide teaching via preferred learning methods  Outcome: Progressing     Problem: METABOLIC, FLUID AND ELECTROLYTES - ADULT  Goal: Electrolytes maintained within normal limits  Description  INTERVENTIONS:  - Monitor labs and assess patient for signs and symptoms of electrolyte imbalances  - Administer electrolyte replacement as ordered  - Monitor response to electrolyte replacements, including repeat lab results as appropriate  - Instruct patient on fluid and nutrition as appropriate  Outcome: Progressing     Problem: GASTROINTESTINAL - ADULT  Goal: Minimal or absence of nausea and/or vomiting  Description  INTERVENTIONS:  - Administer IV fluids as ordered to ensure adequate hydration  - Maintain NPO status until nausea and vomiting are resolved  - Nasogastric tube as ordered  - Administer ordered antiemetic medications as needed  - Provide nonpharmacologic comfort measures as appropriate  - Advance diet as tolerated, if ordered  - Nutrition services referral to assist patient with adequate nutrition and appropriate food choices  Outcome: Progressing     Problem: HEMATOLOGIC - ADULT  Goal: Maintains hematologic stability  Description  INTERVENTIONS  - Assess for signs and symptoms of bleeding or hemorrhage  - Monitor labs  - Administer supportive blood products/factors as ordered and appropriate  Outcome: Progressing     Problem: MUSCULOSKELETAL - ADULT  Goal: Maintain or return mobility to safest level of function  Description  INTERVENTIONS:  - Assess patient's ability to carry out ADLs; assess patient's baseline for ADL function and identify physical deficits which impact ability to perform ADLs (bathing, care of mouth/teeth, toileting, grooming, dressing, etc )  - Assess/evaluate cause of self-care deficits   - Assess range of motion  - Assess patient's mobility; develop plan if impaired  - Assess patient's need for assistive devices and provide as appropriate  - Encourage maximum independence but intervene and supervise when necessary  - Involve family in performance of ADLs  - Assess for home care needs following discharge   - Request OT consult to assist with ADL evaluation and planning for discharge  - Provide patient education as appropriate  Outcome: Progressing  Goal: Maintain proper alignment of affected body part  Description  INTERVENTIONS:  - Support, maintain and protect limb and body alignment  - Provide pt/fam with appropriate education  Outcome: Progressing

## 2019-07-15 NOTE — PLAN OF CARE
Problem: CARDIOVASCULAR - ADULT  Goal: Maintains optimal cardiac output and hemodynamic stability  Description  INTERVENTIONS:  - Monitor I/O, vital signs and rhythm  - Monitor for S/S and trends of decreased cardiac output i e  bleeding, hypotension  - Administer and titrate ordered vasoactive medications to optimize hemodynamic stability  - Assess quality of pulses, skin color and temperature  - Assess for signs of decreased coronary artery perfusion - ex   Angina  - Instruct patient to report change in severity of symptoms  Outcome: Progressing     Problem: CARDIOVASCULAR - ADULT  Goal: Absence of cardiac dysrhythmias or at baseline rhythm  Description  INTERVENTIONS:  - Continuous cardiac monitoring, monitor vital signs, obtain 12 lead EKG if indicated  - Administer antiarrhythmic and heart rate control medications as ordered  - Monitor electrolytes and administer replacement therapy as ordered  Outcome: Progressing

## 2019-07-15 NOTE — SOCIAL WORK
Visit patient in her hospital room to initiate discharge planning  Patient lives with spouse and children in a 1 story home without steps to enter  She is independent at home, drives  Denies need for services on discharge  CM reviewed d/c planning process including the following: identifying help at home, patient preference for d/c planning needs, availability of treatment team to discuss questions or concerns patient and/or family may have regarding understanding medications and recognizing signs and symptoms once discharged  CM also encouraged patient to follow up with all recommended appointments after discharge  Patient advised of importance for patient and family to participate in managing patients medical well being

## 2019-07-16 VITALS
HEART RATE: 89 BPM | BODY MASS INDEX: 29.13 KG/M2 | WEIGHT: 158.29 LBS | TEMPERATURE: 98.8 F | OXYGEN SATURATION: 100 % | HEIGHT: 62 IN | DIASTOLIC BLOOD PRESSURE: 53 MMHG | SYSTOLIC BLOOD PRESSURE: 95 MMHG | RESPIRATION RATE: 18 BRPM

## 2019-07-16 LAB
ANION GAP SERPL CALCULATED.3IONS-SCNC: 7 MMOL/L (ref 4–13)
ATRIAL RATE: 76 BPM
BASOPHILS # BLD AUTO: 0.04 THOUSANDS/ΜL (ref 0–0.1)
BASOPHILS NFR BLD AUTO: 1 % (ref 0–1)
BUN SERPL-MCNC: 13 MG/DL (ref 5–25)
CALCIUM SERPL-MCNC: 7.6 MG/DL (ref 8.3–10.1)
CHLORIDE SERPL-SCNC: 111 MMOL/L (ref 100–108)
CO2 SERPL-SCNC: 24 MMOL/L (ref 21–32)
CREAT SERPL-MCNC: 0.87 MG/DL (ref 0.6–1.3)
EOSINOPHIL # BLD AUTO: 0.15 THOUSAND/ΜL (ref 0–0.61)
EOSINOPHIL NFR BLD AUTO: 3 % (ref 0–6)
ERYTHROCYTE [DISTWIDTH] IN BLOOD BY AUTOMATED COUNT: 14.2 % (ref 11.6–15.1)
GFR SERPL CREATININE-BSD FRML MDRD: 87 ML/MIN/1.73SQ M
GLUCOSE SERPL-MCNC: 79 MG/DL (ref 65–140)
HCT VFR BLD AUTO: 33.9 % (ref 34.8–46.1)
HGB BLD-MCNC: 10.8 G/DL (ref 11.5–15.4)
IMM GRANULOCYTES # BLD AUTO: 0.02 THOUSAND/UL (ref 0–0.2)
IMM GRANULOCYTES NFR BLD AUTO: 0 % (ref 0–2)
LYMPHOCYTES # BLD AUTO: 2.12 THOUSANDS/ΜL (ref 0.6–4.47)
LYMPHOCYTES NFR BLD AUTO: 44 % (ref 14–44)
MCH RBC QN AUTO: 31.3 PG (ref 26.8–34.3)
MCHC RBC AUTO-ENTMCNC: 31.9 G/DL (ref 31.4–37.4)
MCV RBC AUTO: 98 FL (ref 82–98)
MONOCYTES # BLD AUTO: 0.29 THOUSAND/ΜL (ref 0.17–1.22)
MONOCYTES NFR BLD AUTO: 6 % (ref 4–12)
NEUTROPHILS # BLD AUTO: 2.23 THOUSANDS/ΜL (ref 1.85–7.62)
NEUTS SEG NFR BLD AUTO: 46 % (ref 43–75)
NRBC BLD AUTO-RTO: 0 /100 WBCS
P AXIS: 72 DEGREES
PLATELET # BLD AUTO: 229 THOUSANDS/UL (ref 149–390)
PMV BLD AUTO: 10.7 FL (ref 8.9–12.7)
POTASSIUM SERPL-SCNC: 3.5 MMOL/L (ref 3.5–5.3)
PR INTERVAL: 118 MS
QRS AXIS: 70 DEGREES
QRSD INTERVAL: 100 MS
QT INTERVAL: 420 MS
QTC INTERVAL: 472 MS
RBC # BLD AUTO: 3.45 MILLION/UL (ref 3.81–5.12)
SODIUM SERPL-SCNC: 142 MMOL/L (ref 136–145)
T WAVE AXIS: 80 DEGREES
VENTRICULAR RATE: 76 BPM
WBC # BLD AUTO: 4.85 THOUSAND/UL (ref 4.31–10.16)

## 2019-07-16 PROCEDURE — 85025 COMPLETE CBC W/AUTO DIFF WBC: CPT | Performed by: PHYSICIAN ASSISTANT

## 2019-07-16 PROCEDURE — 80048 BASIC METABOLIC PNL TOTAL CA: CPT | Performed by: PHYSICIAN ASSISTANT

## 2019-07-16 PROCEDURE — 93010 ELECTROCARDIOGRAM REPORT: CPT | Performed by: INTERNAL MEDICINE

## 2019-07-16 PROCEDURE — 99239 HOSP IP/OBS DSCHRG MGMT >30: CPT | Performed by: INTERNAL MEDICINE

## 2019-07-16 RX ORDER — POTASSIUM CHLORIDE 14.9 MG/ML
20 INJECTION INTRAVENOUS
Status: COMPLETED | OUTPATIENT
Start: 2019-07-16 | End: 2019-07-16

## 2019-07-16 RX ADMIN — POTASSIUM CHLORIDE 60 MEQ: 20 SOLUTION ORAL at 15:09

## 2019-07-16 RX ADMIN — POTASSIUM CHLORIDE 20 MEQ: 200 INJECTION, SOLUTION INTRAVENOUS at 11:05

## 2019-07-16 RX ADMIN — FERROUS SULFATE TAB 325 MG (65 MG ELEMENTAL FE) 325 MG: 325 (65 FE) TAB at 08:59

## 2019-07-16 RX ADMIN — POTASSIUM CHLORIDE 20 MEQ: 200 INJECTION, SOLUTION INTRAVENOUS at 13:10

## 2019-07-16 RX ADMIN — POTASSIUM CHLORIDE 60 MEQ: 20 SOLUTION ORAL at 17:06

## 2019-07-16 RX ADMIN — POTASSIUM CHLORIDE 60 MEQ: 20 SOLUTION ORAL at 12:02

## 2019-07-16 RX ADMIN — AMILORIDE HYDROCLORIDE 5 MG: 5 TABLET ORAL at 08:59

## 2019-07-16 RX ADMIN — MULTIPLE VITAMINS W/ MINERALS TAB 1 TABLET: TAB at 08:59

## 2019-07-16 RX ADMIN — POTASSIUM CHLORIDE 20 MEQ: 200 INJECTION, SOLUTION INTRAVENOUS at 08:59

## 2019-07-16 RX ADMIN — POTASSIUM CHLORIDE 20 MEQ: 200 INJECTION, SOLUTION INTRAVENOUS at 15:11

## 2019-07-16 RX ADMIN — POTASSIUM CHLORIDE 60 MEQ: 20 SOLUTION ORAL at 06:39

## 2019-07-16 NOTE — ASSESSMENT & PLAN NOTE
· Exacerbated by nausea, vomiting, and diarrhea after prednisone use  · The patient was seen in consultation by Nephrology  · The patient received PO and IV potassium chloride supplementation per Nephrology  · The hypokalemia resolved by the time of discharge  · Upon discharge, the patient will resume her outpatient PO potassium chloride supplementation in addition to her weekly IV potassium chloride infusions per Nephrology

## 2019-07-16 NOTE — NURSING NOTE
Discharge instructions given to patient  Learning of same  Patient in no apparent distress upon discharge

## 2019-07-16 NOTE — DISCHARGE SUMMARY
Discharge- [facility] 1985, 27 y o  female MRN: [id number]    Unit/Bed#: 403-01 Encounter: [id number]    Primary Care Provider: Lawrence Seaman MD   Date and time admitted to hospital: 7/14/2019  2:37 PM        * Hypokalemia  Assessment & Plan  · Exacerbated by nausea, vomiting, and diarrhea after prednisone use  · The patient was seen in consultation by Nephrology  · The patient received PO and IV potassium chloride supplementation per Nephrology  · The hypokalemia resolved by the time of discharge  · Upon discharge, the patient will resume her outpatient PO potassium chloride supplementation in addition to her weekly IV potassium chloride infusions per Nephrology    Stress fracture of right foot with routine healing  Assessment & Plan  · The patient follows with Podiatry and has a specialized boot  · Recent treatment with corticosteroids, which the patient feels triggered her symptoms of nausea, vomiting, and diarrhea  · Resume outpatient follow-up with Podiatry upon discharge    Vertigo  Assessment & Plan  · Continue PO meclizine PRN  Outpatient follow-up with PCP in regards to this matter      Intractable nausea and vomiting  Assessment & Plan  · Likely secondary to prednisone use  · Resolved during the hospitalization        Discharging Physician / Practitioner: Leonel Walker DO  PCP: Lawrence Seaman MD  Admission Date:   Admission Orders (From admission, onward)    Ordered        07/15/19 0719  Inpatient Admission  Once         07/14/19 1624  Place in Observation  Once             Discharge Date: 07/16/19      Consultations During Hospital Stay:  · Nephrology    Procedures Performed:   · None    Significant Findings / Test Results:   ECG 12 lead   Order: [id number]   Status:  Final result   Visible to patient:  Yes (MyChart)   Next appt:  07/18/2019 at 08:00 AM in Infusion Therapy ([facility])    Ref Range & Units 7/14/19 1634   Ventricular Rate BPM 76    Atrial Rate BPM 76    VA Interval ms 118 QRSD Interval ms 100    QT Interval ms 420    QTC Interval ms 472    P Axis degrees 72    QRS Axis degrees 70    T Wave Axis degrees 80       Narrative     Normal sinus rhythm  Normal ECG  No previous ECGs available  Confirmed by Darius Cam ([id number]) on 7/16/2019 8:04:09 AM      Specimen Collected: 07/14/19 16:34   Last Resulted: 07/16/19 08:04             Incidental Findings:   · None     Test Results Pending at Discharge (will require follow up): · None     Outpatient Tests Requested:  · Serial BMP's and magnesium levels per Nephrology    Complications:  None    Reason for Admission:  Hypokalemia    Hospital Course: Aracelis Correa is a 27 y o  female patient who originally presented to the hospital on 7/14/2019 due to nausea, vomiting, and diarrhea  Please see above list of diagnoses and related plan for additional information  Condition at Discharge: stable     Discharge Day Visit / Exam:     Subjective: The patient was seen and examined  The patient is doing better  No chest pain  No shortness of breath  No abdominal pain  No nausea or vomiting  No diarrhea  Vitals: Blood Pressure: 95/53 (07/16/19 1538)  Pulse: 89 (07/16/19 1538)  Temperature: 98 8 °F (37 1 °C) (07/16/19 1538)  Temp Source: Temporal (07/16/19 0736)  Respirations: 18 (07/16/19 1538)  Height: 5' 2" (157 5 cm) (07/14/19 1743)  Weight - Scale: 71 8 kg (158 lb 4 6 oz) (07/16/19 0600)  SpO2: 100 % (07/16/19 1538)  Exam:   Physical Exam  General:  NAD, awake, alert, follows commands  HEENT:  NC/AT, mucous membranes moist  Neck:  Supple, No JVP elevation  CV:  + S1, + S2, RRR  Pulm:  Lung fields are CTA bilaterally  Abd:  Soft, Non-tender, Non-distended  Ext:  No clubbing/cyanosis/edema  Skin:  No rashes    Discharge instructions/Information to patient and family:   See after visit summary for information provided to patient and family        Provisions for Follow-Up Care:  See after visit summary for information related to follow-up care and any pertinent home health orders  Disposition:     Home    Planned Readmission:  No     Discharge Statement:  I spent 35 minutes discharging the patient  This time was spent on the day of discharge  I had direct contact with the patient on the day of discharge  Greater than 50% of the total time was spent examining patient, answering all patient questions, arranging and discussing plan of care with patient as well as directly providing post-discharge instructions  Additional time then spent on discharge activities  Discharge Medications:  See after visit summary for reconciled discharge medications provided to patient and family        ** Please Note: This note has been constructed using a voice recognition system **

## 2019-07-16 NOTE — SOCIAL WORK
Called Dr Michelle Patricia office who is aware pt is being dc'd home today  Office stated pt was just there on June 19th and doesn't have another follow up until October (4 months)  CM requested they get pt in this Month as she is a high risk readmission  Office will check with Dr Nikolai Ham and get back to pt  If he wants to see her sooner

## 2019-07-16 NOTE — SOCIAL WORK
Discussed with patient preferences on discharge;understanding how to manage health at home; purpose of taking medications; importance of follow up care/appointments; and symptoms to watch out for once discharged home  Pt is being dc'd home on this date with outpatient follow up with Dr Marcie Velasquez as well as outpatient potassium infusions as needed based on weekly labs  CM will be calling DR Ricketts's office shortly when they are back from lunch

## 2019-07-16 NOTE — ASSESSMENT & PLAN NOTE
· The patient follows with Podiatry and has a specialized boot  · Recent treatment with corticosteroids, which the patient feels triggered her symptoms of nausea, vomiting, and diarrhea  · Resume outpatient follow-up with Podiatry upon discharge

## 2019-07-18 ENCOUNTER — HOSPITAL ENCOUNTER (OUTPATIENT)
Dept: INFUSION CENTER | Facility: HOSPITAL | Age: 34
Discharge: HOME | End: 2019-07-18

## 2019-07-23 NOTE — UTILIZATION REVIEW
Notification of Discharge  This is a Notification of Discharge from our facility [address]  Please be advised that this patient has been discharge from our facility  Below you will find the admission and discharge date and time including the patients disposition  PRESENTATION DATE: 7/14/2019  2:37 PM  OBS ADMISSION DATE:   IP ADMISSION DATE: 7/15/19 0719   DISCHARGE DATE: 7/16/2019  5:28 PM  DISPOSITION: Home/Self Care Home/Self Care   Admission Orders listed below:  Admission Orders (From admission, onward)    Ordered        07/15/19 0719  Inpatient Admission  Once         07/14/19 1624  Place in Observation  Once             Please contact the UR Department if additional information is required to close this patient's authorization/case  [address]  Utilization Review Department  Phone: [phone]; Fax [phone]  [email or web address]  ATTENTION: Please call with any questions or concerns to [phone]  and carefully listen to the prompts so that you are directed to the right person  Send all requests for admission clinical reviews, approved or denied determinations and any other requests to fax [phone]   All voicemails are confidential

## 2019-07-25 ENCOUNTER — HOSPITAL ENCOUNTER (OUTPATIENT)
Dept: INFUSION CENTER | Facility: HOSPITAL | Age: 34
Discharge: HOME | End: 2019-07-25

## 2019-08-01 ENCOUNTER — HOSPITAL ENCOUNTER (OUTPATIENT)
Dept: INFUSION CENTER | Facility: HOSPITAL | Age: 34
Discharge: HOME | End: 2019-08-01
Payer: COMMERCIAL

## 2019-08-01 VITALS
HEART RATE: 84 BPM | RESPIRATION RATE: 18 BRPM | SYSTOLIC BLOOD PRESSURE: 115 MMHG | OXYGEN SATURATION: 97 % | DIASTOLIC BLOOD PRESSURE: 56 MMHG | TEMPERATURE: 97.6 F

## 2019-08-01 DIAGNOSIS — E87.6 HYPOKALEMIA: Primary | ICD-10-CM

## 2019-08-01 PROCEDURE — 96366 THER/PROPH/DIAG IV INF ADDON: CPT

## 2019-08-01 PROCEDURE — 96365 THER/PROPH/DIAG IV INF INIT: CPT

## 2019-08-01 RX ORDER — SODIUM CHLORIDE 9 MG/ML
20 INJECTION, SOLUTION INTRAVENOUS CONTINUOUS
Status: DISCONTINUED | OUTPATIENT
Start: 2019-08-01 | End: 2019-08-04 | Stop reason: HOSPADM

## 2019-08-01 RX ORDER — SODIUM CHLORIDE 9 MG/ML
20 INJECTION, SOLUTION INTRAVENOUS CONTINUOUS
Status: CANCELLED
Start: 2019-08-05

## 2019-08-01 RX ADMIN — SODIUM CHLORIDE 20 ML/HR: 0.9 INJECTION, SOLUTION INTRAVENOUS at 08:24

## 2019-08-01 RX ADMIN — POTASSIUM CHLORIDE: 149 INJECTION, SOLUTION, CONCENTRATE INTRAVENOUS at 08:54

## 2019-08-01 NOTE — PLAN OF CARE
Problem: INFECTION - ADULT  Goal: Absence or prevention of progression during hospitalization  Description  INTERVENTIONS:  - Assess and monitor for signs and symptoms of infection  - Monitor lab/diagnostic results  - Monitor all insertion sites, i e  indwelling lines, tubes, and drains  - Monitor endotracheal (as able) and nasal secretions for changes in amount and color  - [place] appropriate cooling/warming therapies per order  - Administer medications as ordered  - Instruct and encourage patient and family to use good hand hygiene technique  - Identify and instruct in appropriate isolation precautions for identified infection/condition  Outcome: Progressing     Problem: Knowledge Deficit  Goal: Patient/family/caregiver demonstrates understanding of disease process, treatment plan, medications, and discharge instructions  Description  Complete learning assessment and assess knowledge base    Interventions:  - Provide teaching at level of understanding  - Provide teaching via preferred learning methods  Outcome: Progressing

## 2019-08-01 NOTE — PROGRESS NOTES
8403 Patient states she has some numbness and tingling mouth and both hands  States it is her normal symptoms  States she feels well enough to get infusion today  1317 Patient tolerated infusion well and discharged in stable condition

## 2019-08-05 ENCOUNTER — APPOINTMENT (EMERGENCY)
Dept: RADIOLOGY | Facility: HOSPITAL | Age: 34
DRG: 425 | End: 2019-08-05
Payer: COMMERCIAL

## 2019-08-05 ENCOUNTER — HOSPITAL ENCOUNTER (INPATIENT)
Facility: HOSPITAL | Age: 34
LOS: 2 days | Discharge: HOME | DRG: 425 | End: 2019-08-08
Attending: EMERGENCY MEDICINE | Admitting: FAMILY MEDICINE
Payer: COMMERCIAL

## 2019-08-05 DIAGNOSIS — E87.6 HYPOKALEMIA: Primary | ICD-10-CM

## 2019-08-05 DIAGNOSIS — R53.83 FATIGUE: ICD-10-CM

## 2019-08-05 DIAGNOSIS — Z98.84 GASTRIC BYPASS STATUS FOR OBESITY: ICD-10-CM

## 2019-08-05 LAB
ALBUMIN SERPL BCP-MCNC: 3.7 G/DL (ref 3.5–5)
ALP SERPL-CCNC: 55 U/L (ref 46–116)
ALT SERPL W P-5'-P-CCNC: 29 U/L (ref 12–78)
ANION GAP SERPL CALCULATED.3IONS-SCNC: 14 MMOL/L (ref 4–13)
AST SERPL W P-5'-P-CCNC: 31 U/L (ref 5–45)
BASOPHILS # BLD AUTO: 0.07 THOUSANDS/ΜL (ref 0–0.1)
BASOPHILS NFR BLD AUTO: 1 % (ref 0–1)
BILIRUB SERPL-MCNC: 0.3 MG/DL (ref 0.2–1)
BUN SERPL-MCNC: 23 MG/DL (ref 5–25)
CALCIUM SERPL-MCNC: 8.4 MG/DL (ref 8.3–10.1)
CHLORIDE SERPL-SCNC: 98 MMOL/L (ref 100–108)
CK SERPL-CCNC: 95 U/L (ref 26–192)
CO2 SERPL-SCNC: 27 MMOL/L (ref 21–32)
CREAT SERPL-MCNC: 1.33 MG/DL (ref 0.6–1.3)
EOSINOPHIL # BLD AUTO: 0.21 THOUSAND/ΜL (ref 0–0.61)
EOSINOPHIL NFR BLD AUTO: 2 % (ref 0–6)
ERYTHROCYTE [DISTWIDTH] IN BLOOD BY AUTOMATED COUNT: 13 % (ref 11.6–15.1)
GFR SERPL CREATININE-BSD FRML MDRD: 52 ML/MIN/1.73SQ M
GLUCOSE SERPL-MCNC: 91 MG/DL (ref 65–140)
HCT VFR BLD AUTO: 41.9 % (ref 34.8–46.1)
HGB BLD-MCNC: 14.1 G/DL (ref 11.5–15.4)
IMM GRANULOCYTES # BLD AUTO: 0.02 THOUSAND/UL (ref 0–0.2)
IMM GRANULOCYTES NFR BLD AUTO: 0 % (ref 0–2)
LYMPHOCYTES # BLD AUTO: 2.29 THOUSANDS/ΜL (ref 0.6–4.47)
LYMPHOCYTES NFR BLD AUTO: 25 % (ref 14–44)
MAGNESIUM SERPL-MCNC: 2.6 MG/DL (ref 1.6–2.6)
MCH RBC QN AUTO: 30.6 PG (ref 26.8–34.3)
MCHC RBC AUTO-ENTMCNC: 33.7 G/DL (ref 31.4–37.4)
MCV RBC AUTO: 91 FL (ref 82–98)
MONOCYTES # BLD AUTO: 0.85 THOUSAND/ΜL (ref 0.17–1.22)
MONOCYTES NFR BLD AUTO: 9 % (ref 4–12)
NEUTROPHILS # BLD AUTO: 5.9 THOUSANDS/ΜL (ref 1.85–7.62)
NEUTS SEG NFR BLD AUTO: 63 % (ref 43–75)
NRBC BLD AUTO-RTO: 0 /100 WBCS
PHOSPHATE SERPL-MCNC: 4.8 MG/DL (ref 2.7–4.5)
PLATELET # BLD AUTO: 292 THOUSANDS/UL (ref 149–390)
PMV BLD AUTO: 10.5 FL (ref 8.9–12.7)
POTASSIUM SERPL-SCNC: 1.9 MMOL/L (ref 3.5–5.3)
PROT SERPL-MCNC: 7.1 G/DL (ref 6.4–8.2)
RBC # BLD AUTO: 4.61 MILLION/UL (ref 3.81–5.12)
SODIUM SERPL-SCNC: 139 MMOL/L (ref 136–145)
TROPONIN I SERPL-MCNC: <0.02 NG/ML
TSH SERPL DL<=0.05 MIU/L-ACNC: 2.33 UIU/ML (ref 0.36–3.74)
WBC # BLD AUTO: 9.34 THOUSAND/UL (ref 4.31–10.16)

## 2019-08-05 PROCEDURE — 84484 ASSAY OF TROPONIN QUANT: CPT | Performed by: EMERGENCY MEDICINE

## 2019-08-05 PROCEDURE — 84100 ASSAY OF PHOSPHORUS: CPT | Performed by: EMERGENCY MEDICINE

## 2019-08-05 PROCEDURE — 99285 EMERGENCY DEPT VISIT HI MDM: CPT | Performed by: EMERGENCY MEDICINE

## 2019-08-05 PROCEDURE — 84484 ASSAY OF TROPONIN QUANT: CPT | Performed by: NURSE PRACTITIONER

## 2019-08-05 PROCEDURE — 84443 ASSAY THYROID STIM HORMONE: CPT | Performed by: EMERGENCY MEDICINE

## 2019-08-05 PROCEDURE — 71046 X-RAY EXAM CHEST 2 VIEWS: CPT

## 2019-08-05 PROCEDURE — 82550 ASSAY OF CK (CPK): CPT | Performed by: EMERGENCY MEDICINE

## 2019-08-05 PROCEDURE — 83735 ASSAY OF MAGNESIUM: CPT | Performed by: EMERGENCY MEDICINE

## 2019-08-05 PROCEDURE — 93005 ELECTROCARDIOGRAM TRACING: CPT

## 2019-08-05 PROCEDURE — 96365 THER/PROPH/DIAG IV INF INIT: CPT

## 2019-08-05 PROCEDURE — 99285 EMERGENCY DEPT VISIT HI MDM: CPT

## 2019-08-05 PROCEDURE — 96361 HYDRATE IV INFUSION ADD-ON: CPT

## 2019-08-05 PROCEDURE — 85025 COMPLETE CBC W/AUTO DIFF WBC: CPT | Performed by: EMERGENCY MEDICINE

## 2019-08-05 PROCEDURE — 99220 PR INITIAL OBSERVATION CARE/DAY 70 MINUTES: CPT | Performed by: NURSE PRACTITIONER

## 2019-08-05 PROCEDURE — 80053 COMPREHEN METABOLIC PANEL: CPT | Performed by: EMERGENCY MEDICINE

## 2019-08-05 PROCEDURE — 36415 COLL VENOUS BLD VENIPUNCTURE: CPT | Performed by: EMERGENCY MEDICINE

## 2019-08-05 RX ORDER — POTASSIUM CHLORIDE 14.9 MG/ML
40 INJECTION INTRAVENOUS ONCE
Status: COMPLETED | OUTPATIENT
Start: 2019-08-05 | End: 2019-08-05

## 2019-08-05 RX ORDER — ZINC GLUCONATE 50 MG
50 TABLET ORAL DAILY
Status: DISCONTINUED | OUTPATIENT
Start: 2019-08-06 | End: 2019-08-06 | Stop reason: RX

## 2019-08-05 RX ORDER — POTASSIUM CHLORIDE 14.9 MG/ML
40 INJECTION INTRAVENOUS ONCE
Status: COMPLETED | OUTPATIENT
Start: 2019-08-05 | End: 2019-08-06

## 2019-08-05 RX ORDER — MECLIZINE HYDROCHLORIDE 25 MG/1
25 TABLET ORAL
Status: DISCONTINUED | OUTPATIENT
Start: 2019-08-05 | End: 2019-08-08 | Stop reason: HOSPADM

## 2019-08-05 RX ORDER — AMITRIPTYLINE HYDROCHLORIDE 50 MG/1
50 TABLET, FILM COATED ORAL
Status: DISCONTINUED | OUTPATIENT
Start: 2019-08-05 | End: 2019-08-08 | Stop reason: HOSPADM

## 2019-08-05 RX ORDER — SODIUM CHLORIDE 9 MG/ML
75 INJECTION, SOLUTION INTRAVENOUS CONTINUOUS
Status: DISCONTINUED | OUTPATIENT
Start: 2019-08-05 | End: 2019-08-07

## 2019-08-05 RX ORDER — AMILORIDE HYDROCHLORIDE 5 MG/1
5 TABLET ORAL DAILY
Status: DISCONTINUED | OUTPATIENT
Start: 2019-08-06 | End: 2019-08-08 | Stop reason: HOSPADM

## 2019-08-05 RX ORDER — FERROUS SULFATE 325(65) MG
325 TABLET ORAL EVERY OTHER DAY
Status: DISCONTINUED | OUTPATIENT
Start: 2019-08-06 | End: 2019-08-08 | Stop reason: HOSPADM

## 2019-08-05 RX ORDER — THIAMINE MONONITRATE (VIT B1) 100 MG
100 TABLET ORAL DAILY
Status: DISCONTINUED | OUTPATIENT
Start: 2019-08-06 | End: 2019-08-08 | Stop reason: HOSPADM

## 2019-08-05 RX ADMIN — MECLIZINE HYDROCHLORIDE 25 MG: 25 TABLET ORAL at 22:47

## 2019-08-05 RX ADMIN — POTASSIUM CHLORIDE 40 MEQ: 200 INJECTION, SOLUTION INTRAVENOUS at 18:57

## 2019-08-05 RX ADMIN — POTASSIUM CHLORIDE 40 MEQ: 200 INJECTION, SOLUTION INTRAVENOUS at 23:21

## 2019-08-05 RX ADMIN — AMITRIPTYLINE HYDROCHLORIDE 50 MG: 50 TABLET, FILM COATED ORAL at 22:47

## 2019-08-05 RX ADMIN — SODIUM CHLORIDE 125 ML/HR: 0.9 INJECTION, SOLUTION INTRAVENOUS at 17:54

## 2019-08-05 NOTE — ED PROVIDER NOTES
History  Chief Complaint   Patient presents with    Weakness - Generalized     "Thinks her K+ is low " For the past week has been having weakness, shaky, and "cant get her thought together " Does go to infusion and has hx of low K+  No SOB, but does have chest heaviness that occurs when that is low  Patient is a 28-year-old female  She has a history of gastric bypass  She has had problems with low potassium in the past   She has been feeling generalized weakness for the last week or so  She is feeling vibrations  She is experiencing some shortness of breath and chest tightness  She has experienced the same symptoms in the past when her potassium is low  She has required potassium infusions in the past   She does take oral potassium  She denies nausea, vomiting or diarrhea  Symptoms are moderate in intensity  No relieving factors  Prior to Admission Medications   Prescriptions Last Dose Informant Patient Reported? Taking?    AMILoride 5 mg tablet 8/5/2019 at 0700 Self No No   Sig: Take 1 tablet (5 mg total) by mouth daily   amitriptyline (ELAVIL) 50 mg tablet 8/5/2019 at n  Yes No   Sig: Take 50 mg by mouth daily at bedtime   b complex-C-folic acid (NEPHRO-EDIE) 0 8 mg tablet 8/5/2019 at 0700  Yes No   Sig: Take 0 8 mg by mouth daily with dinner   ergocalciferol (ERGOCALCIFEROL) 11588 units capsule 8/5/2019 at 0700  Yes No   Sig: Take 50,000 Units by mouth once a week Patient takes this med on Mondays    ferrous sulfate 325 (65 Fe) mg tablet 8/5/2019 at 0700  Yes No   Sig: Take 325 mg by mouth every other day    meclizine (ANTIVERT) 25 mg tablet 8/5/2019 at 2253  Yes No   Sig: Take 25 mg by mouth daily at bedtime   potassium chloride 10 % oral solution 8/5/2019 at 1530  No No   Sig: Take 60 meq PO twice a day And take 40 meq 3 times a day   thiamine 100 MG tablet 8/5/2019 at 0700  Yes No   Sig: Take 100 mg by mouth daily   zinc gluconate 50 mg tablet 8/5/2019 at 0700  Yes No   Sig: Take 50 mg by mouth daily      Facility-Administered Medications: None       Past Medical History:   Diagnosis Date    H  pylori infection     Hypokalemia     Migraine        Past Surgical History:   Procedure Laterality Date    ABDOMINAL SURGERY      APPENDECTOMY      CHOLECYSTECTOMY      COSMETIC SURGERY      FL GUIDED CENTRAL VENOUS ACCESS DEVICE INSERTION  12/21/2018    GASTRIC BYPASS      HYSTERECTOMY      TUNNELED VENOUS PORT PLACEMENT N/A 12/21/2018    Procedure: INSERTION VENOUS PORT (PORT-A-CATH); Surgeon: Regla Sears DO;  Location: [facility];  Service: General       Family History   Problem Relation Age of Onset    Hypertension Father     Diabetes Father     Diabetes Maternal Grandfather      I have reviewed and agree with the history as documented  Social History     Tobacco Use    Smoking status: Never Smoker    Smokeless tobacco: Never Used   Substance Use Topics    Alcohol use: Yes     Alcohol/week: 0 0 standard drinks     Frequency: Monthly or less     Drinks per session: 1 or 2     Binge frequency: Never    Drug use: No        Review of Systems   Constitutional: Positive for fatigue  Negative for chills and fever  HENT: Negative for rhinorrhea and sore throat  Eyes: Negative for pain, redness and visual disturbance  Respiratory: Positive for shortness of breath  Negative for cough  Cardiovascular: Positive for chest pain  Negative for leg swelling  Gastrointestinal: Negative for abdominal pain, diarrhea and vomiting  Endocrine: Negative for polydipsia and polyuria  Genitourinary: Negative for dysuria, frequency, hematuria, vaginal bleeding and vaginal discharge  Musculoskeletal: Negative for back pain and neck pain  Skin: Negative for rash and wound  Allergic/Immunologic: Negative for immunocompromised state  Neurological: Positive for weakness  Negative for numbness and headaches  Hematological: Does not bruise/bleed easily     Psychiatric/Behavioral: Negative for hallucinations and suicidal ideas  Physical Exam  Physical Exam   Constitutional: She is oriented to person, place, and time  She appears well-developed and well-nourished  HENT:   Head: Normocephalic and atraumatic  Mouth/Throat: Oropharynx is clear and moist    Eyes: Conjunctivae are normal  Right eye exhibits no discharge  Left eye exhibits no discharge  No scleral icterus  Neck: Normal range of motion  Neck supple  Cardiovascular: Normal rate, regular rhythm, normal heart sounds and intact distal pulses  Exam reveals no gallop and no friction rub  No murmur heard  Pulmonary/Chest: Effort normal and breath sounds normal  No stridor  No respiratory distress  She has no wheezes  She has no rales  Abdominal: Soft  Bowel sounds are normal  She exhibits no distension  There is no tenderness  There is no rebound and no guarding  Musculoskeletal: Normal range of motion  She exhibits no edema, tenderness or deformity  No CVA tenderness  No calf tenderness/palpable cords  Neurological: She is alert and oriented to person, place, and time  She has normal strength  No sensory deficit  GCS eye subscore is 4  GCS verbal subscore is 5  GCS motor subscore is 6  Skin: Skin is warm and dry  No rash noted  Psychiatric: She has a normal mood and affect  Her behavior is normal    Vitals reviewed        Vital Signs  ED Triage Vitals [08/05/19 1645]   Temperature Pulse Respirations Blood Pressure SpO2   98 2 °F (36 8 °C) 64 16 117/62 97 %      Temp Source Heart Rate Source Patient Position - Orthostatic VS BP Location FiO2 (%)   Temporal Monitor Sitting Right arm --      Pain Score       No Pain           Vitals:    08/05/19 1730 08/05/19 1845 08/05/19 2015 08/05/19 2107   BP: 120/67 114/60 120/67 120/84   Pulse: 78 73 75 77   Patient Position - Orthostatic VS:  Sitting Sitting Sitting         Visual Acuity      ED Medications  Medications   sodium chloride 0 9 % infusion (125 mL/hr Intravenous New Bag 8/5/19 1754)   potassium chloride 20 mEq IVPB (premix) (40 mEq Intravenous New Bag 8/5/19 1857)   AMILoride tablet 5 mg (has no administration in time range)   amitriptyline (ELAVIL) tablet 50 mg (50 mg Oral Given 8/5/19 2247)   ferrous sulfate tablet 325 mg (has no administration in time range)   meclizine (ANTIVERT) tablet 25 mg (25 mg Oral Given 8/5/19 2247)   zinc gluconate tablet 50 mg (has no administration in time range)   thiamine (VITAMIN B1) tablet 100 mg (has no administration in time range)   potassium chloride 20 mEq IVPB (premix) (has no administration in time range)   potassium chloride 20 mEq IVPB (premix) (has no administration in time range)       Diagnostic Studies  Results Reviewed     Procedure Component Value Units Date/Time    Comprehensive metabolic panel [[id number]]  (Abnormal) Collected:  08/05/19 1753    Lab Status:  Final result Specimen:  Blood from Arm, Right Updated:  08/05/19 1824     Sodium 139 mmol/L      Potassium 1 9 mmol/L      Chloride 98 mmol/L      CO2 27 mmol/L      ANION GAP 14 mmol/L      BUN 23 mg/dL      Creatinine 1 33 mg/dL      Glucose 91 mg/dL      Calcium 8 4 mg/dL      AST 31 U/L      ALT 29 U/L      Alkaline Phosphatase 55 U/L      Total Protein 7 1 g/dL      Albumin 3 7 g/dL      Total Bilirubin 0 30 mg/dL      eGFR 52 ml/min/1 73sq m     Narrative:       [place] guidelines for Chronic Kidney Disease (CKD):     Stage 1 with normal or high GFR (GFR > 90 mL/min/1 73 square meters)    Stage 2 Mild CKD (GFR = 60-89 mL/min/1 73 square meters)    Stage 3A Moderate CKD (GFR = 45-59 mL/min/1 73 square meters)    Stage 3B Moderate CKD (GFR = 30-44 mL/min/1 73 square meters)    Stage 4 Severe CKD (GFR = 15-29 mL/min/1 73 square meters)    Stage 5 End Stage CKD (GFR <15 mL/min/1 73 square meters)  Note: GFR calculation is accurate only with a steady state creatinine    Troponin I [[id number]]  (Normal) Collected:  08/05/19 1753    Lab Status: Final result Specimen:  Blood from Arm, Right Updated:  08/05/19 1824     Troponin I <0 02 ng/mL     TSH [[id number]]  (Normal) Collected:  08/05/19 1753    Lab Status:  Final result Specimen:  Blood from Arm, Right Updated:  08/05/19 1824     TSH 3RD GENERATON 2 330 uIU/mL     Narrative:       Patients undergoing fluorescein dye angiography may retain small amounts of fluorescein in the body for 48-72 hours post procedure  Samples containing fluorescein can produce falsely depressed TSH values  If the patient had this procedure,a specimen should be resubmitted post fluorescein clearance        CK (with reflex to MB) [[id number]]  (Normal) Collected:  08/05/19 1753    Lab Status:  Final result Specimen:  Blood from Arm, Right Updated:  08/05/19 1816     Total CK 95 U/L     Phosphorus [[id number]]  (Abnormal) Collected:  08/05/19 1753    Lab Status:  Final result Specimen:  Blood from Arm, Right Updated:  08/05/19 1814     Phosphorus 4 8 mg/dL     Magnesium [[id number]]  (Normal) Collected:  08/05/19 1753    Lab Status:  Final result Specimen:  Blood from Arm, Right Updated:  08/05/19 1814     Magnesium 2 6 mg/dL     CBC and differential [[id number]] Collected:  08/05/19 1753    Lab Status:  Final result Specimen:  Blood from Arm, Right Updated:  08/05/19 1759     WBC 9 34 Thousand/uL      RBC 4 61 Million/uL      Hemoglobin 14 1 g/dL      Hematocrit 41 9 %      MCV 91 fL      MCH 30 6 pg      MCHC 33 7 g/dL      RDW 13 0 %      MPV 10 5 fL      Platelets 787 Thousands/uL      nRBC 0 /100 WBCs      Neutrophils Relative 63 %      Immat GRANS % 0 %      Lymphocytes Relative 25 %      Monocytes Relative 9 %      Eosinophils Relative 2 %      Basophils Relative 1 %      Neutrophils Absolute 5 90 Thousands/µL      Immature Grans Absolute 0 02 Thousand/uL      Lymphocytes Absolute 2 29 Thousands/µL      Monocytes Absolute 0 85 Thousand/µL      Eosinophils Absolute 0 21 Thousand/µL      Basophils Absolute 0 07 Thousands/µL XR chest 2 views   ED Interpretation by Kaycee Akhtar MD (08/05 1850)   No infiltrates  No congestive heart failure  Final Result by Janessa Bingham MD (08/05 2130)      No acute cardiopulmonary disease  Workstation performed: [id number]                    Procedures  ECG 12 Lead Documentation Only  Date/Time: 8/5/2019 6:02 PM  Performed by: Kaycee Akhtar MD  Authorized by: Kaycee Akhtar MD     ECG reviewed by me, the ED Provider: yes    Patient location:  ED  Comments:      Normal sinus rhythm  Poor R-wave progression  Nonspecific ST and T-wave abnormality  New from old EKG  ED Course               PERC Rule for PE      Most Recent Value   PERC Rule for PE   Age >=50  0 Filed at: 08/05/2019 1719   HR >=100  0 Filed at: 08/05/2019 1719   O2 Sat on room air < 95%  0 Filed at: 08/05/2019 1719   History of PE or DVT  0 Filed at: 08/05/2019 1719   Recent trauma or surgery  0 Filed at: 08/05/2019 1719   Hemoptysis  0 Filed at: 08/05/2019 1719   Exogenous estrogen  0 Filed at: 08/05/2019 1719   Unilateral leg swelling  0 Filed at: 08/05/2019 1719   PERC Rule for PE Results  0 Filed at: 08/05/2019 1719                      MDM  Number of Diagnoses or Management Options  Diagnosis management comments: Patient was profoundly hypokalemic  There were so seated EKG changes  IV potassium was ordered  Patient is perc negative  There is no acute coronary syndrome or congestive heart failure  No pneumonia  Consulted with hospitalist for admission         Amount and/or Complexity of Data Reviewed  Clinical lab tests: ordered and reviewed  Tests in the radiology section of CPT®: ordered and reviewed  Discuss the patient with other providers: yes  Independent visualization of images, tracings, or specimens: yes        Disposition  Final diagnoses:   Hypokalemia   Fatigue     Time reflects when diagnosis was documented in both MDM as applicable and the Disposition within this note Time User Action Codes Description Comment    8/5/2019  8:04 PM Ritesh Chacon Add [E87 6] Hypokalemia     8/5/2019  8:04 PM Ritesh Chacon Add [R53 83] Fatigue       ED Disposition     ED Disposition Condition Date/Time Comment    Admit Stable Mon Aug 5, 2019  8:05 PM       Follow-up Information    None         Current Discharge Medication List      CONTINUE these medications which have NOT CHANGED    Details   AMILoride 5 mg tablet Take 1 tablet (5 mg total) by mouth daily  Qty: 30 tablet, Refills: 0    Associated Diagnoses: Hypokalemia      amitriptyline (ELAVIL) 50 mg tablet Take 50 mg by mouth daily at bedtime      b complex-C-folic acid (NEPHRO-EDIE) 0 8 mg tablet Take 0 8 mg by mouth daily with dinner      ergocalciferol (ERGOCALCIFEROL) 67970 units capsule Take 50,000 Units by mouth once a week Patient takes this med on Mondays       ferrous sulfate 325 (65 Fe) mg tablet Take 325 mg by mouth every other day       meclizine (ANTIVERT) 25 mg tablet Take 25 mg by mouth daily at bedtime      potassium chloride 10 % oral solution Take 60 meq PO twice a day And take 40 meq 3 times a day  Qty: 38505 mL, Refills: 0    Associated Diagnoses: Acute hypokalemia      thiamine 100 MG tablet Take 100 mg by mouth daily      zinc gluconate 50 mg tablet Take 50 mg by mouth daily           No discharge procedures on file      ED Provider  Electronically Signed by           Martha Armijo MD  08/05/19 8465

## 2019-08-05 NOTE — ED NOTES
Unable to obtain blood work from patient  Two failed attempts at peripheral vascular access and one failed attempt at Port-a-cath access, port flushed well however no blood return        Jai Petty RN  08/05/19 1950

## 2019-08-06 LAB
ANION GAP SERPL CALCULATED.3IONS-SCNC: 9 MMOL/L (ref 4–13)
ATRIAL RATE: 76 BPM
BUN SERPL-MCNC: 20 MG/DL (ref 5–25)
CA-I BLD-SCNC: 1.11 MMOL/L (ref 1.12–1.32)
CALCIUM SERPL-MCNC: 8 MG/DL (ref 8.3–10.1)
CHLORIDE SERPL-SCNC: 105 MMOL/L (ref 100–108)
CO2 SERPL-SCNC: 26 MMOL/L (ref 21–32)
CREAT SERPL-MCNC: 1.06 MG/DL (ref 0.6–1.3)
ERYTHROCYTE [DISTWIDTH] IN BLOOD BY AUTOMATED COUNT: 13.1 % (ref 11.6–15.1)
GFR SERPL CREATININE-BSD FRML MDRD: 68 ML/MIN/1.73SQ M
GLUCOSE P FAST SERPL-MCNC: 85 MG/DL (ref 65–99)
GLUCOSE SERPL-MCNC: 85 MG/DL (ref 65–140)
HCT VFR BLD AUTO: 36.5 % (ref 34.8–46.1)
HGB BLD-MCNC: 12.1 G/DL (ref 11.5–15.4)
MAGNESIUM SERPL-MCNC: 2.5 MG/DL (ref 1.6–2.6)
MCH RBC QN AUTO: 30.7 PG (ref 26.8–34.3)
MCHC RBC AUTO-ENTMCNC: 33.2 G/DL (ref 31.4–37.4)
MCV RBC AUTO: 93 FL (ref 82–98)
P AXIS: 55 DEGREES
PHOSPHATE SERPL-MCNC: 3.7 MG/DL (ref 2.7–4.5)
PLATELET # BLD AUTO: 267 THOUSANDS/UL (ref 149–390)
PMV BLD AUTO: 10.7 FL (ref 8.9–12.7)
POTASSIUM SERPL-SCNC: 2.4 MMOL/L (ref 3.5–5.3)
POTASSIUM SERPL-SCNC: 2.9 MMOL/L (ref 3.5–5.3)
POTASSIUM SERPL-SCNC: 3.4 MMOL/L (ref 3.5–5.3)
PR INTERVAL: 102 MS
QRS AXIS: 21 DEGREES
QRSD INTERVAL: 108 MS
QT INTERVAL: 356 MS
QTC INTERVAL: 400 MS
RBC # BLD AUTO: 3.94 MILLION/UL (ref 3.81–5.12)
SODIUM SERPL-SCNC: 140 MMOL/L (ref 136–145)
T WAVE AXIS: 124 DEGREES
TROPONIN I SERPL-MCNC: <0.02 NG/ML
VENTRICULAR RATE: 76 BPM
WBC # BLD AUTO: 7.2 THOUSAND/UL (ref 4.31–10.16)

## 2019-08-06 PROCEDURE — 84132 ASSAY OF SERUM POTASSIUM: CPT | Performed by: NURSE PRACTITIONER

## 2019-08-06 PROCEDURE — 82330 ASSAY OF CALCIUM: CPT | Performed by: INTERNAL MEDICINE

## 2019-08-06 PROCEDURE — 84100 ASSAY OF PHOSPHORUS: CPT | Performed by: NURSE PRACTITIONER

## 2019-08-06 PROCEDURE — 83735 ASSAY OF MAGNESIUM: CPT | Performed by: NURSE PRACTITIONER

## 2019-08-06 PROCEDURE — 80048 BASIC METABOLIC PNL TOTAL CA: CPT | Performed by: NURSE PRACTITIONER

## 2019-08-06 PROCEDURE — 93010 ELECTROCARDIOGRAM REPORT: CPT | Performed by: INTERNAL MEDICINE

## 2019-08-06 PROCEDURE — 99232 SBSQ HOSP IP/OBS MODERATE 35: CPT | Performed by: FAMILY MEDICINE

## 2019-08-06 PROCEDURE — 85027 COMPLETE CBC AUTOMATED: CPT | Performed by: NURSE PRACTITIONER

## 2019-08-06 RX ORDER — POTASSIUM CHLORIDE 14.9 MG/ML
20 INJECTION INTRAVENOUS
Status: COMPLETED | OUTPATIENT
Start: 2019-08-06 | End: 2019-08-06

## 2019-08-06 RX ORDER — POTASSIUM CHLORIDE 14.9 MG/ML
20 INJECTION INTRAVENOUS
Status: DISCONTINUED | OUTPATIENT
Start: 2019-08-06 | End: 2019-08-06

## 2019-08-06 RX ORDER — POTASSIUM CHLORIDE 14.9 MG/ML
40 INJECTION INTRAVENOUS ONCE
Status: COMPLETED | OUTPATIENT
Start: 2019-08-06 | End: 2019-08-06

## 2019-08-06 RX ORDER — ZINC SULFATE 50(220)MG
220 CAPSULE ORAL DAILY
Status: DISCONTINUED | OUTPATIENT
Start: 2019-08-06 | End: 2019-08-08 | Stop reason: HOSPADM

## 2019-08-06 RX ADMIN — FERROUS SULFATE TAB 325 MG (65 MG ELEMENTAL FE) 325 MG: 325 (65 FE) TAB at 08:17

## 2019-08-06 RX ADMIN — POTASSIUM CHLORIDE 40 MEQ: 200 INJECTION, SOLUTION INTRAVENOUS at 02:02

## 2019-08-06 RX ADMIN — MECLIZINE HYDROCHLORIDE 25 MG: 25 TABLET ORAL at 21:31

## 2019-08-06 RX ADMIN — SODIUM CHLORIDE 125 ML/HR: 0.9 INJECTION, SOLUTION INTRAVENOUS at 11:12

## 2019-08-06 RX ADMIN — AMILORIDE HYDROCHLORIDE 5 MG: 5 TABLET ORAL at 10:36

## 2019-08-06 RX ADMIN — POTASSIUM CHLORIDE 20 MEQ: 14.9 INJECTION, SOLUTION INTRAVENOUS at 15:57

## 2019-08-06 RX ADMIN — POTASSIUM CHLORIDE 20 MEQ: 14.9 INJECTION, SOLUTION INTRAVENOUS at 11:20

## 2019-08-06 RX ADMIN — POTASSIUM CHLORIDE 20 MEQ: 14.9 INJECTION, SOLUTION INTRAVENOUS at 12:22

## 2019-08-06 RX ADMIN — AMITRIPTYLINE HYDROCHLORIDE 50 MG: 50 TABLET, FILM COATED ORAL at 21:31

## 2019-08-06 RX ADMIN — POTASSIUM CHLORIDE 20 MEQ: 14.9 INJECTION, SOLUTION INTRAVENOUS at 14:48

## 2019-08-06 RX ADMIN — Medication 100 MG: at 08:17

## 2019-08-06 RX ADMIN — POTASSIUM CHLORIDE 20 MEQ: 200 INJECTION, SOLUTION INTRAVENOUS at 08:39

## 2019-08-06 RX ADMIN — POTASSIUM CHLORIDE 20 MEQ: 14.9 INJECTION, SOLUTION INTRAVENOUS at 06:43

## 2019-08-06 RX ADMIN — Medication 220 MG: at 08:17

## 2019-08-06 NOTE — UTILIZATION REVIEW
Notification of Inpatient Admission/Inpatient Authorization Request  This is a Notification of Inpatient Admission/Request for Inpatient Authorization for our facility [address] [facility] [address]  Be advised that this patient was admitted to our facility under Inpatient Status  Please contact the Utilization Review Department where the patient is receiving care services for additional admission information  Place of Service Code: 24   Place of Service Name: Inpatient Hospital  Presentation Date & Time: 8/5/2019  4:40 PM  Inpatient Admission Date & Time: 8/6/19 1128  Discharge Date & Time: No discharge date for patient encounter  Discharge Disposition (if discharged): Home/Self Care  Attending Physician: Antonia Moser Md [[id number]]   [address] [facility]  HOMAR Morris  National Practioner ID- [id number]  Primary Office:  [facility] , [facility] [address] [place], [address] [facility]  Phone 1: [phone]  Fax: [phone]  Admission Orders (From admission, onward)     Ordered        08/06/19 1127  Inpatient Admission  Once         08/05/19 2003  Place in Observation  Once                   Facility: [address] [place] [address] Utilization Review Department  Phone: [phone]; Fax [phone]  [email or web address]  ATTENTION: Please call with any questions or concerns to [phone]  and carefully listen to the prompts so that you are directed to the right person  Send all requests for admission clinical reviews, approved or denied determinations and any other requests to fax [phone]   All voicemails are confidential

## 2019-08-06 NOTE — ASSESSMENT & PLAN NOTE
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

## 2019-08-06 NOTE — H&P
H&P- Neris Jones 1985, 27 y o  female MRN: [id number]    Unit/Bed#: VT50 Encounter: [id number]    Primary Care Provider: Tony Moon MD   Date and time admitted to hospital: 8/5/2019  4:40 PM        Fatigue  Assessment & Plan  Persistent fatigue for the past few days-patient reports feeling like this when she is hypokalemic  Please see treatment plan for hypokalemia  Provide supportive care    Hypokalemia  Assessment & Plan  Potassium 1 9 on admission -repleted  Trend BMP  Replete as needed  Med surge with telemetry  Monitor per protocol  Initial EKG mild ST depressions    Acute kidney injury ([facility] [address] )  Assessment & Plan  Baseline creatinine approximately 0 9-current creatinine 1 33  Provide gentle hydration  Trend BMP  Cautious use of nephrotoxin agents    B12 deficiency  Assessment & Plan  Continue prior to admission medications      VTE Prophylaxis: low risk ambulate early  / sequential compression device   Code Status: FULL  POLST: POLST is not applicable to this patient    Anticipated Length of Stay:  Patient will be admitted on an Observation basis with an anticipated length of stay of  Less than 2 midnights  Justification for Hospital Stay: hypokalemia    Total Time for Visit, including Counseling / Coordination of Care: 1 hour  Greater than 50% of this total time spent on direct patient counseling and coordination of care  Chief Complaint:   Fatigue     History of Present Illness:    Neris Jones is a 27 y o  female who presented to the emergency room for chief complaint of generalized weakness and fatigue  Patient states I think my potassium is low   Patient has a past medical history including hypokalemia HPI early infection and migraines past surgical history of gastric bypass cholecystectomy abdominal surgery and appendectomy  Patient states that she has problems with low potassium in the past and goes to infusion center for repletion    Patient states she has and feeling generalized weakness for the past week or so, experiences mild shortness of breath and chest tightness at times-states that this is how she normally feels when her potassium is low she reports being compliant with her home potassium supplementation she denies any abdominal pain nausea vomiting diarrhea  Denies lightheadedness dizziness vision changes denies dysuria urgency or frequency denies melena hematochezia  Images and labs were collected emergency room-see below  Patient's potassium level was 1 9 on admission to emergency room-patient will be admitted by emergency room doctor  Review of Systems:    Review of Systems   Constitutional: Positive for activity change and fatigue  Respiratory: Positive for chest tightness  Cardiovascular: Positive for palpitations  Neurological: Positive for weakness  All other systems reviewed and are negative  Past Medical and Surgical History:     Past Medical History:   Diagnosis Date    H  pylori infection     Hypokalemia     Migraine        Past Surgical History:   Procedure Laterality Date    ABDOMINAL SURGERY      APPENDECTOMY      CHOLECYSTECTOMY      COSMETIC SURGERY      FL GUIDED CENTRAL VENOUS ACCESS DEVICE INSERTION  12/21/2018    GASTRIC BYPASS      HYSTERECTOMY      TUNNELED VENOUS PORT PLACEMENT N/A 12/21/2018    Procedure: INSERTION VENOUS PORT (PORT-A-CATH); Surgeon: Lucas Paez DO;  Location: [facility];  Service: General       Meds/Allergies:    Prior to Admission medications    Medication Sig Start Date End Date Taking?  Authorizing Provider   AMILoride 5 mg tablet Take 1 tablet (5 mg total) by mouth daily 2/20/19   Kevin Escoto DO   amitriptyline (ELAVIL) 50 mg tablet Take 50 mg by mouth daily at bedtime 8/27/18 8/27/19  Historical Provider, MD   b complex-C-folic acid (NEPHRO-EDIE) 0 8 mg tablet Take 0 8 mg by mouth daily with dinner    Historical Provider, MD   ergocalciferol (ERGOCALCIFEROL) 87296 units capsule Take 50,000 Units by mouth once a week Patient takes this med on Mondays     Historical Provider, MD   ferrous sulfate 325 (65 Fe) mg tablet Take 325 mg by mouth every other day     Historical Provider, MD   meclizine (ANTIVERT) 25 mg tablet Take 25 mg by mouth daily at bedtime 8/27/18   Historical Provider, MD   potassium chloride 10 % oral solution Take 60 meq PO twice a day And take 40 meq 3 times a day 7/7/19   Ariadne Delacruz MD   thiamine 100 MG tablet Take 100 mg by mouth daily    Historical Provider, MD   zinc gluconate 50 mg tablet Take 50 mg by mouth daily    Historical Provider, MD     I have reviewed home medications with patient personally  Allergies: Allergies   Allergen Reactions    Nsaids      Other reaction(s):  Other (Please comment)  Should not take s/p bariatric surgery    Prednisone      Nausea, vomiting, diarrhea       Social History:     Marital Status: /Civil Union   Occupation:  Noncontributory  Patient Pre-hospital Living Situation: independent  Patient Pre-hospital Level of Mobility: independent  Patient Pre-hospital Diet Restrictions: denies  Substance Use History:   Social History     Substance and Sexual Activity   Alcohol Use Yes    Alcohol/week: 0 0 standard drinks    Frequency: Monthly or less    Drinks per session: 1 or 2    Binge frequency: Never     Social History     Tobacco Use   Smoking Status Never Smoker   Smokeless Tobacco Never Used     Social History     Substance and Sexual Activity   Drug Use No       Family History:    Family History   Problem Relation Age of Onset    Hypertension Father     Diabetes Father     Diabetes Maternal Grandfather        Physical Exam:     Vitals:   Blood Pressure: 114/60 (08/05/19 1845)  Pulse: 73 (08/05/19 1845)  Temperature: 98 2 °F (36 8 °C) (08/05/19 1645)  Temp Source: Temporal (08/05/19 1645)  Respirations: 14 (08/05/19 1845)  Height: 5' 2" (157 5 cm) (08/05/19 1645)  Weight - Scale: 69 3 kg (152 lb 12 5 oz) (08/05/19 1645)  SpO2: 100 % (08/05/19 1565)    Physical Exam   Constitutional: She is oriented to person, place, and time  She appears well-developed and well-nourished  No distress  HENT:   Head: Normocephalic and atraumatic  Eyes: Pupils are equal, round, and reactive to light  EOM are normal  Right eye exhibits no discharge  Left eye exhibits no discharge  No scleral icterus  Neck: Normal range of motion  Neck supple  Cardiovascular: Normal rate, regular rhythm, normal heart sounds and intact distal pulses  Exam reveals no gallop and no friction rub  No murmur heard  Pulmonary/Chest: Effort normal and breath sounds normal  No stridor  No respiratory distress  She has no wheezes  Abdominal: Soft  Bowel sounds are normal  She exhibits no distension  There is no tenderness  There is no guarding  Musculoskeletal: She exhibits no edema, tenderness or deformity  Neurological: She is alert and oriented to person, place, and time  She displays normal reflexes  No cranial nerve deficit  Coordination normal    Skin: Skin is warm and dry  Capillary refill takes 2 to 3 seconds  No rash noted  She is not diaphoretic  No erythema  No pallor  Psychiatric: She has a normal mood and affect  Her behavior is normal  Judgment and thought content normal      Additional Data:     Lab Results: I have personally reviewed pertinent reports  Results from last 7 days   Lab Units 08/05/19  1753   WBC Thousand/uL 9 34   HEMOGLOBIN g/dL 14 1   HEMATOCRIT % 41 9   PLATELETS Thousands/uL 292   NEUTROS PCT % 63   LYMPHS PCT % 25   MONOS PCT % 9   EOS PCT % 2     Results from last 7 days   Lab Units 08/05/19  1753   POTASSIUM mmol/L 1 9*   CHLORIDE mmol/L 98*   CO2 mmol/L 27   BUN mg/dL 23   CREATININE mg/dL 1 33*   CALCIUM mg/dL 8 4   ALK PHOS U/L 55   ALT U/L 29   AST U/L 31           Imaging: I have personally reviewed pertinent reports  No results found      EKG, Pathology, and Other Studies Reviewed on Admission:   · EKG: reviewed    Allscripts / Epic Records Reviewed: Yes     ** Please Note: This note has been constructed using a voice recognition system   **

## 2019-08-06 NOTE — ASSESSMENT & PLAN NOTE
Potassium 1 9 on admission -repleted  Trend BMP  Replete as needed  Med surge with telemetry  Monitor per protocol  Initial EKG mild ST depressions

## 2019-08-06 NOTE — ASSESSMENT & PLAN NOTE
a/e/b vitamin, mineral, and element abnormalities, in the setting of a gastric bypass patient - requiring IV NSS, K+, Cl-, Fe, thiamine, nephrovite, and D2 - and daily CMP/BMPs       Recurrent admissions with hypokalemia despite receiving KCl IV infusions on outpatient basis - may need to increase frequency of infusions

## 2019-08-06 NOTE — ASSESSMENT & PLAN NOTE
Persistent fatigue for the past few days-patient reports feeling like this when she is hypokalemic  Please see treatment plan for hypokalemia  Provide supportive care

## 2019-08-06 NOTE — PLAN OF CARE
Problem: Potential for Falls  Goal: Patient will remain free of falls  Description  INTERVENTIONS:  - Assess patient frequently for physical needs  -  Identify cognitive and physical deficits and behaviors that affect risk of falls  -  [place] fall precautions as indicated by assessment   - Educate patient/family on patient safety including physical limitations  - Instruct patient to call for assistance with activity based on assessment  - Modify environment to reduce risk of injury  - Consider OT/PT consult to assist with strengthening/mobility  Outcome: Progressing     Problem: PAIN - ADULT  Goal: Verbalizes/displays adequate comfort level or baseline comfort level  Description  Interventions:  - Encourage patient to monitor pain and request assistance  - Assess pain using appropriate pain scale  - Administer analgesics based on type and severity of pain and evaluate response  - Implement non-pharmacological measures as appropriate and evaluate response  - Consider cultural and social influences on pain and pain management  - Notify physician/advanced practitioner if interventions unsuccessful or patient reports new pain  Outcome: Progressing     Problem: INFECTION - ADULT  Goal: Absence or prevention of progression during hospitalization  Description  INTERVENTIONS:  - Assess and monitor for signs and symptoms of infection  - Monitor lab/diagnostic results  - [place] appropriate cooling/warming therapies per order  - Administer medications as ordered  - Instruct and encourage patient and family to use good hand hygiene technique   Outcome: Progressing     Problem: SAFETY ADULT  Goal: Patient will remain free of falls  Description  INTERVENTIONS:  - Assess patient frequently for physical needs  -  Identify cognitive and physical deficits and behaviors that affect risk of falls    -  [place] fall precautions as indicated by assessment   - Educate patient/family on patient safety including physical limitations  - Instruct patient to call for assistance with activity based on assessment  - Modify environment to reduce risk of injury  - Consider OT/PT consult to assist with strengthening/mobility  Outcome: Progressing  Goal: Maintain or return to baseline ADL function  Description  INTERVENTIONS:  -  Assess patient's ability to carry out ADLs; assess patient's baseline for ADL function and identify physical deficits which impact ability to perform ADLs (bathing, care of mouth/teeth, toileting, grooming, dressing, etc )  - Assess/evaluate cause of self-care deficits   - Assess range of motion  - Assess patient's mobility; develop plan if impaired  - Assess patient's need for assistive devices and provide as appropriate  - Encourage maximum independence but intervene and supervise when necessary  ¯ Involve family in performance of ADLs  ¯ Assess for home care needs following discharge   ¯ Request OT consult to assist with ADL evaluation and planning for discharge  ¯ Provide patient education as appropriate  Outcome: Progressing  Goal: Maintain or return mobility status to optimal level  Description  INTERVENTIONS:  - Assess patient's baseline mobility status (ambulation, transfers, stairs, etc )    - Identify cognitive and physical deficits and behaviors that affect mobility  - Identify mobility aids required to assist with transfers and/or ambulation (gait belt, sit-to-stand, lift, walker, cane, etc )  - [place] fall precautions as indicated by assessment  - Record patient progress and toleration of activity level on Mobility SBAR; progress patient to next Phase/Stage  - Instruct patient to call for assistance with activity based on assessment  - Request Rehabilitation consult to assist with strengthening/weightbearing, etc   Outcome: Progressing     Problem: DISCHARGE PLANNING  Goal: Discharge to home or other facility with appropriate resources  Description  INTERVENTIONS:  - Identify barriers to discharge w/patient and caregiver  - Arrange for needed discharge resources and transportation as appropriate  - Identify discharge learning needs (meds)  - Arrange for interpretive services to assist at discharge as needed  - Refer to Case Management Department for coordinating discharge planning if the patient needs post-hospital services based on physician/advanced practitioner order or complex needs related to functional status, cognitive ability, or social support system   Outcome: Progressing     Problem: Knowledge Deficit  Goal: Patient/family/caregiver demonstrates understanding of disease process, treatment plan, medications, and discharge instructions  Description  Complete learning assessment and assess knowledge base  Interventions:  - Provide teaching at level of understanding  - Provide teaching via preferred learning methods  Outcome: Progressing     Problem: CARDIOVASCULAR - ADULT  Goal: Maintains optimal cardiac output and hemodynamic stability  Description  INTERVENTIONS:  - Monitor I/O, vital signs and rhythm  - Monitor for S/S and trends of decreased cardiac output i e  bleeding, hypotension  - Administer and titrate ordered vasoactive medications to optimize hemodynamic stability  - Assess quality of pulses, skin color and temperature  - Assess for signs of decreased coronary artery perfusion - ex   Angina  - Instruct patient to report change in severity of symptoms  Outcome: Progressing  Goal: Absence of cardiac dysrhythmias or at baseline rhythm  Description  INTERVENTIONS:  - Continuous cardiac monitoring, monitor vital signs, obtain 12 lead EKG if indicated  - Administer antiarrhythmic and heart rate control medications as ordered  - Monitor electrolytes and administer replacement therapy as ordered  Outcome: Progressing     Problem: METABOLIC, FLUID AND ELECTROLYTES - ADULT  Goal: Electrolytes maintained within normal limits  Description  INTERVENTIONS:  - Monitor labs and assess patient for signs and symptoms of electrolyte imbalances  - Administer electrolyte replacement as ordered  - Monitor response to electrolyte replacements, including repeat lab results as appropriate  - Instruct patient on fluid and nutrition as appropriate  Outcome: Progressing  Goal: Fluid balance maintained  Description  INTERVENTIONS:  - Monitor labs   - Instruct patient on fluid and nutrition as appropriate   Outcome: Progressing     Problem: DISCHARGE PLANNING - CARE MANAGEMENT  Goal: Discharge to post-acute care or home with appropriate resources  Description  INTERVENTIONS:  - Conduct assessment to determine patient/family and health care team treatment goals, and need for post-acute services based on payer coverage, community resources, and patient preferences, and barriers to discharge  - Address psychosocial, clinical, and financial barriers to discharge as identified in assessment in conjunction with the patient/family and health care team  - Arrange appropriate level of post-acute services according to patient's   needs and preference and payer coverage in collaboration with the physician and health care team  - Communicate with and update the patient/family, physician, and health care team regarding progress on the discharge plan  - Arrange appropriate transportation to post-acute venues  -Outpatient follow up after dc   Outcome: Progressing

## 2019-08-06 NOTE — ASSESSMENT & PLAN NOTE
Recurrent hospitalizations despite receiving scheduled IV infusions outpatient - may need to increase frequency  Potassium 1 9 on admission - improved to 3 4 this afternoon  Administer additional 40 meq IV  BMP and Mg level in am  Nephrology consulted  Continue amiloride  Patient referred to specialized clinic at [place] [id number] for recommendations and will be seen there upon changing of insurance provider

## 2019-08-06 NOTE — CONSULTS
Consultation - Nephrology   Colletta Nice 27 y o  female MRN: [id number]  Unit/Bed#: 870-89 Encounter: [id number]      A/P:  1  Hypokalemia: she will receive oral KCl and will add an additional K-rider today  Check labs later today  2  Hypo phosphatemia: historically - will check level  3  History of gastric bypass: has maintained her weight loss with diet without K restriction  4  Chest tightness: resolved  5  Hypocalcemia by history: will check an ionized calcium         Thank you for allowing us to participate in the care of your patient  Please feel free to contact us regarding the care of this patient, or any other questions/concerns that may be applicable  Patient Active Problem List   Diagnosis    Hypokalemia    History of gastric bypass    Migraine without aura and without status migrainosus, not intractable    Left-sided weakness    Hypophosphatemia    Anemia    Vitamin D deficiency    Weakness of both lower extremities    Weakness of both upper extremities    Elevated CPK    Vitamin B12 deficiency    Cervical lymphadenopathy    Fatigue    Paresthesia of both lower extremities    Paresthesias    B12 deficiency    Gastric bypass status for obesity    GERD (gastroesophageal reflux disease)    Migraine    WAGNER (obstructive sleep apnea)    Other intestinal malabsorption    Acute kidney injury ([address] )    Right ovarian cyst    Chest pain    Intractable nausea and vomiting    Vertigo    Stress fracture of right foot with routine healing       History of Present Illness   Physician Requesting Consult: Tali Capps MD  Reason for Consult / Principal Problem: hypokalemia  Hx and PE limited by:   HPI: Colletta Nice is a 27y o  year old female who presents with severe weakness and cmild chest tightness and presented with a potassium of 1 9  She receives weekly infusions of KCl 40 meq every Thursday as well as daily oral supplements of liquid KCl    Last Monday her labs were drawn   There was a possibility of a lab error as her magnesium was uncharacteristically high and potassium was not very low suggesting hemolysis  She has a long history of potassium depletion  She had a gastric bypass in the past and has been exhaustively worked up for GI loss which was negative and renal loss as well  She does not use laxatives or diuretics  History obtained from chart review and the patient    Review of Systems - Negative except as mentioned above in HPI, more specifics as mentioned below  Review of Systems - General ROS: positive for  - fatigue  Ophthalmic ROS: negative  ENT ROS: negative  Respiratory ROS: no cough, shortness of breath, or wheezing  Cardiovascular ROS: positive for - chest pain and palpitations  Gastrointestinal ROS: no abdominal pain, change in bowel habits, or black or bloody stools  Genito-Urinary ROS: no dysuria, trouble voiding, or hematuria  Musculoskeletal ROS: positive for - muscular weakness  Neurological ROS: no TIA or stroke symptoms  Dermatological ROS: negative    Historical Information   Past Medical History:   Diagnosis Date    H  pylori infection     Hypokalemia     Migraine      Past Surgical History:   Procedure Laterality Date    ABDOMINAL SURGERY      APPENDECTOMY      CHOLECYSTECTOMY      COSMETIC SURGERY      FL GUIDED CENTRAL VENOUS ACCESS DEVICE INSERTION  12/21/2018    GASTRIC BYPASS      HYSTERECTOMY      TUNNELED VENOUS PORT PLACEMENT N/A 12/21/2018    Procedure: INSERTION VENOUS PORT (PORT-A-CATH);   Surgeon: Brock Shaffer DO;  Location: [facility];  Service: General     Social History   Social History     Substance and Sexual Activity   Alcohol Use Yes    Alcohol/week: 0 0 standard drinks    Frequency: Monthly or less    Drinks per session: 1 or 2    Binge frequency: Never     Social History     Substance and Sexual Activity   Drug Use No     Social History     Tobacco Use   Smoking Status Never Smoker   Smokeless Tobacco Never Used Family History   Problem Relation Age of Onset    Hypertension Father     Diabetes Father     Diabetes Maternal Grandfather        Meds/Allergies   all current active meds have been reviewed, current meds:   Current Facility-Administered Medications   Medication Dose Route Frequency    AMILoride tablet 5 mg  5 mg Oral Daily    amitriptyline (ELAVIL) tablet 50 mg  50 mg Oral HS    ferrous sulfate tablet 325 mg  325 mg Oral Every Other Day    meclizine (ANTIVERT) tablet 25 mg  25 mg Oral HS    sodium chloride 0 9 % infusion  125 mL/hr Intravenous Continuous    thiamine (VITAMIN B1) tablet 100 mg  100 mg Oral Daily    zinc sulfate (ZINCATE) capsule 220 mg  220 mg Oral Daily    and PTA meds:    Medications Prior to Admission   Medication    AMILoride 5 mg tablet    amitriptyline (ELAVIL) 50 mg tablet    b complex-C-folic acid (NEPHRO-EDIE) 0 8 mg tablet    ergocalciferol (ERGOCALCIFEROL) 36778 units capsule    ferrous sulfate 325 (65 Fe) mg tablet    meclizine (ANTIVERT) 25 mg tablet    potassium chloride 10 % oral solution    thiamine 100 MG tablet    zinc gluconate 50 mg tablet         Allergies   Allergen Reactions    Nsaids      Other reaction(s): Other (Please comment)  Should not take s/p bariatric surgery    Prednisone      Nausea, vomiting, diarrhea       Objective     Intake/Output Summary (Last 24 hours) at 8/6/2019 1043  Last data filed at 8/6/2019 0907  Gross per 24 hour   Intake 1592 5 ml   Output    Net 1592 5 ml       Invasive Devices:        Physical Exam      I/O last 3 completed shifts:   In: 1412 5 [I V :1012 5; IV [id number]]  Out: -     Vitals:    08/06/19 0808   BP: 105/60   Pulse: 99   Resp:    Temp:    SpO2: 100%       Gen: in NAD, Alert/Awake  HEENT: no sclerous icterus, MMM, neck supple  CV: +S1/S2, RRR  Lungs: CTA bilaterally  Abd: +BS, soft NT/ND  Ext: all four extremities are warm  Skin: no rashes noted  Neuro: CN II-XII intact    Current Weight: Weight - Scale: 68 7 kg (151 lb 7 3 oz)  First Weight: Weight - Scale: 69 3 kg (152 lb 12 5 oz)    Lab Results:  I have personally reviewed pertinent labs  CBC:   Lab Results   Component Value Date    WBC 7 20 08/06/2019    HGB 12 1 08/06/2019    HCT 36 5 08/06/2019    MCV 93 08/06/2019     08/06/2019    MCH 30 7 08/06/2019    MCHC 33 2 08/06/2019    RDW 13 1 08/06/2019    MPV 10 7 08/06/2019    NRBC 0 08/05/2019     CMP:   Lab Results   Component Value Date    K 2 4 (LL) 08/06/2019     08/06/2019    CO2 26 08/06/2019    BUN 20 08/06/2019    CREATININE 1 06 08/06/2019    CALCIUM 8 0 (L) 08/06/2019    AST 31 08/05/2019    ALT 29 08/05/2019    ALKPHOS 55 08/05/2019    EGFR 68 08/06/2019     Phosphorus:   Lab Results   Component Value Date    PHOS 3 7 08/06/2019     Magnesium:   Lab Results   Component Value Date    MG 2 5 08/06/2019     Urinalysis: No results found for: Verlyn Rodriguez, SPECGRAV, PHUR, LEUKOCYTESUR, NITRITE, PROTEINUA, GLUCOSEU, KETONESU, BILIRUBINUR, BLOODU  Ionized Calcium: No results found for: CAION  Coagulation: No results found for: PT, INR, APTT  Troponin:   Lab Results   Component Value Date    TROPONINI <0 02 08/05/2019     ABG: No results found for: PHART, PWY8HAN, PO2ART, AXN6DWW, R6KNXHQG, BEART, SOURCE    Results from last 7 days   Lab Units 08/06/19  0517 08/06/19  0201 08/05/19  1753   POTASSIUM mmol/L 2 4* 2 9* 1 9*   CHLORIDE mmol/L 105  --  98*   CO2 mmol/L 26  --  27   BUN mg/dL 20  --  23   CREATININE mg/dL 1 06  --  1 33*   CALCIUM mg/dL 8 0*  --  8 4   ALK PHOS U/L  --   --  55   ALT U/L  --   --  29   AST U/L  --   --  31       Radiology review:  Procedure: Xr Chest 2 Views    Result Date: 8/5/2019  Narrative: CHEST INDICATION:   cp  COMPARISON:  1/31/2019 EXAM PERFORMED/VIEWS:  XR CHEST PA & LATERAL FINDINGS:  Right chest port unchanged  Cardiomediastinal silhouette appears stable  The lungs are clear  No pneumothorax or pleural effusion   Osseous structures appear within normal limits for patient age  Impression: No acute cardiopulmonary disease  Workstation performed: [id number]         EKG, Pathology, and Other Studies: reviewed      Counseling / Coordination of Care  Total ADDITIONAL floor / unit time spent today 25 minutes  Greater than 50% of total time was spent with the patient and / or family counseling and / or coordination of care   A description of the counseling / coordination of care: follows    Edgar Spears MD

## 2019-08-06 NOTE — ASSESSMENT & PLAN NOTE
Baseline creatinine approximately 0 9-current creatinine 1 33  Resolved with IVF  Trend BMP  Cautious use of nephrotoxin agents

## 2019-08-06 NOTE — SOCIAL WORK
Cm met with the patient to evaluate the patients prior function and living situation and any barriers to d/c and form a safe d/c plan  Cm also evaluated the patient for any services in the home or needs for services  Pt resides at home with her  kids in a ranch house  Pt is independent with her adls and ambulation  Pt gets weekly lab draws through [email or web address] and goes to outpatient infusion at our infusion center once a week (was going twice a week prior)  Pt and spouse both drive  PCP was Speedy Gomez (pt hasn't seen him in about a year)  Pt sees Dr Greg Velasquez  Pharmacy is [facility] in [facility]  Pt plans home on dc with outpatient follow up  CM will follow and assist in dc planning

## 2019-08-06 NOTE — NURSING NOTE
Mediport to the [facility], [facility] accessed as ordered utilizing sterile technique  Patient tolerated procedure well  Flushed with ease and good blood return was noted

## 2019-08-06 NOTE — UTILIZATION REVIEW
Initial Clinical Review    Admission: Date/Time/Statement:   OBS   ORDER    8/5  @    2003    IP ORDER  ENTERED   8/6  @   [id number]         08/06/19 1127  Inpatient Admission Once     Transfer Service: General Medicine    Expected Discharge Date: 08/07/19       Question Answer Comment   Admitting Physician Manny Fry    Level of Care Med Surg    Estimated length of stay More than 2 Midnights    Certification I certify that inpatient services are medically necessary for this patient for a duration of greater than two midnights  See H&P and MD Progress Notes for additional information about the patient's course of treatment  08/06/19 1127         ED Arrival Information     Expected Arrival Acuity Means of Arrival Escorted By Service Admission Type    - 8/5/2019 16:21 Urgent Walk-In Self Hospitalist Urgent    Arrival Complaint    Generalized Weakness        Chief Complaint   Patient presents with    Weakness - Generalized     "Thinks her K+ is low " For the past week has been having weakness, shaky, and "cant get her thought together " Does go to infusion and has hx of low K+  No SOB, but does have chest heaviness that occurs when that is low       Assessment/Plan: Fatigue  Assessment & Plan  Persistent fatigue for the past few days-patient reports feeling like this when she is hypokalemic  Please see treatment plan for hypokalemia  Provide supportive care     Hypokalemia  Assessment & Plan  Potassium 1 9 on admission -repleted  Trend BMP  Replete as needed  Med surge with telemetry  Monitor per protocol  Initial EKG mild ST depressions     Acute kidney injury ([address] )  Assessment & Plan  Baseline creatinine approximately 0 9-current creatinine 1 33  Provide gentle hydration  Trend BMP  Cautious use of nephrotoxin agents     B12 deficiency  Assessment & Plan  Continue prior to admission medications    Shyane Mejía is a 27 y o  female who presented to the emergency room for chief complaint of generalized weakness and fatigue  Patient states I think my potassium is low   Patient has a past medical history including hypokalemia HPI early infection and migraines past surgical history of gastric bypass cholecystectomy abdominal surgery and appendectomy  Patient states that she has problems with low potassium in the past and goes to infusion center for repletion  Patient states she has and feeling generalized weakness for the past week or so, experiences mild shortness of breath and chest tightness at times-states that this is how she normally feels when her potassium is low she reports being compliant with her home potassium supplementation she denies any abdominal pain nausea vomiting diarrhea  Denies lightheadedness dizziness vision changes denies dysuria urgency or frequency denies melena hematochezia  Images and labs were collected emergency room-see below  Patient's potassium level was 1 9 on admission to emergency room-patient will be admitted by emergency room doctor  Per  Nephrology  Consult:      P:  1  Hypokalemia: she will receive oral KCl and will add an additional K-rider today  Check labs later today  2  Hypo phosphatemia: historically - will check level  3  History of gastric bypass: has maintained her weight loss with diet without K restriction  4  Chest tightness: resolved  5   Hypocalcemia by history: will check an ionized calcium      ED Triage Vitals [08/05/19 1645]   Temperature Pulse Respirations Blood Pressure SpO2   98 2 °F (36 8 °C) 64 16 117/62 97 %      Temp Source Heart Rate Source Patient Position - Orthostatic VS BP Location FiO2 (%)   Temporal Monitor Sitting Right arm --      Pain Score       No Pain        Wt Readings from Last 1 Encounters:   08/06/19 68 7 kg (151 lb 7 3 oz)     Additional Vital Signs:   08/06/19 08:08:37    99    105/60  75  100 %       08/06/19 07:23:57  97 8 °F (36 6 °C)  80  18  102/66  78  99 %  None (Room air)  Lying   08/05/19 23:32:13  98 1 °F (36 7 °C) 77  16  109/66  80  100 %       08/05/19 21:07:12  98 1 °F (36 7 °C)  77  16  120/84  96  100 %  None (Room air)  Sitting   08/05/19 2015    75  17  120/67    100 %  None (Room air)  Sitting   08/05/19 1845    73  14  114/60    100 %  None (Room air)  Sitting   08/05/19 1730    78    120/67  89  98 %       08/05/19 1700    82    114/62  82  98 %       08/05/19 1645  98 2 °F (36 8 °C)  64  16  117/62    97 %  None (Room air)  Sitting         Pertinent Labs/Diagnostic Test Results:   Results from last 7 days   Lab Units 08/06/19  0517 08/05/19  1753   WBC Thousand/uL 7 20 9 34   HEMOGLOBIN g/dL 12 1 14 1   HEMATOCRIT % 36 5 41 9   PLATELETS Thousands/uL 267 292   NEUTROS ABS Thousands/µL  --  5 90         Results from last 7 days   Lab Units 08/06/19  0517 08/06/19  0201 08/05/19  1753   SODIUM mmol/L 140  --  139   POTASSIUM mmol/L 2 4* 2 9* 1 9*   CHLORIDE mmol/L 105  --  98*   CO2 mmol/L 26  --  27   ANION GAP mmol/L 9  --  14*   BUN mg/dL 20  --  23   CREATININE mg/dL 1 06  --  1 33*   EGFR ml/min/1 73sq m 68  --  52   CALCIUM mg/dL 8 0*  --  8 4   MAGNESIUM mg/dL 2 5  --  2 6   PHOSPHORUS mg/dL 3 7  --  4 8*     Results from last 7 days   Lab Units 08/05/19  1753   AST U/L 31   ALT U/L 29   ALK PHOS U/L 55   TOTAL PROTEIN g/dL 7 1   ALBUMIN g/dL 3 7   TOTAL BILIRUBIN mg/dL 0 30         Results from last 7 days   Lab Units 08/06/19  0517 08/05/19  1753   GLUCOSE RANDOM mg/dL 85 91           Results from last 7 days   Lab Units 08/05/19  1753   CK TOTAL U/L 95     Results from last 7 days   Lab Units 08/05/19  2344 08/05/19  1753   TROPONIN I ng/mL <0 02 <0 02             Results from last 7 days   Lab Units 08/05/19  1753   TSH 3RD GENERATON uIU/mL 2 330           CXR:     NAD  EKG:    NSR    Poor   R wave  Progression    Nonspecific  St and  T wave  Abnormality     New from previous  EKG    ED Treatment:   Medication Administration from 08/05/2019 2411 to 08/05/2019 9213       Date/Time Order Dose Route Comments     08/05/2019 1750 sodium chloride 0 9 % infusion 125 mL/hr Intravenous      08/05/2019 1857 potassium chloride 20 mEq IVPB (premix) 40 mEq Intravenous         Past Medical History:   Diagnosis Date    H  pylori infection     Hypokalemia     Migraine      Present on Admission:   Acute [address] [address] )   B12 deficiency   Hypokalemia      Admitting Diagnosis: Hypokalemia [E87 6]  Fatigue [R53 83]  Generalized weakness [R53 1]  Age/Sex: 27 y o  female  Admission Orders:    Current Facility-Administered Medications:  AMILoride 5 mg Oral Daily   amitriptyline 50 mg Oral HS   ferrous sulfate 325 mg Oral Every Other Day   meclizine 25 mg Oral HS   potassium chloride 40 mEq Intravenous Once   sodium chloride 125 mL/hr Intravenous Continuous   thiamine 100 mg Oral Daily   zinc sulfate 220 mg Oral Daily       IP CONSULT TO CASE MANAGEMENT   tele    Network Utilization Review Department  Phone: [phone]; Fax [phone]  [email or web address]  [email or web address]  ATTENTION: Please call with any questions or concerns to [phone]  and carefully listen to the prompts so that you are directed to the right person  Send all requests for admission clinical reviews, approved or denied determinations and any other requests to fax [phone]   All voicemails are confidential

## 2019-08-06 NOTE — ASSESSMENT & PLAN NOTE
Baseline creatinine approximately 0 9-current creatinine 1 33  Provide gentle hydration  Trend BMP  Cautious use of nephrotoxin agents

## 2019-08-06 NOTE — PROGRESS NOTES
Progress Note - Rivas Vargas 1985, 27 y o  female MRN: [id number]    Unit/Bed#: 654-47 Encounter: [id number]    Primary Care Provider: Mahsa Calderon DO   Date and time admitted to hospital: 8/5/2019  4:40 PM        * Hypokalemia  Assessment & Plan  Recurrent hospitalizations despite receiving scheduled IV infusions outpatient - may need to increase frequency  Potassium 1 9 on admission - improved to 3 4 this afternoon  Administer additional 40 meq IV  BMP and Mg level in am  Nephrology consulted  Continue amiloride  Patient referred to specialized clinic at [facility] for recommendations and will be seen there upon changing of insurance provider    Acute kidney injury [facility])  Assessment & Plan  Baseline creatinine approximately 0 9-current creatinine 1 33  Resolved with IVF  Trend BMP  Cautious use of nephrotoxin agents    Other intestinal malabsorption  Assessment & Plan  a/e/b vitamin, mineral, and element abnormalities, in the setting of a gastric bypass patient - requiring IV NSS, K+, Cl-, Fe, thiamine, nephrovite, and D2 - and daily CMP/BMPs  Recurrent admissions with hypokalemia despite receiving KCl IV infusions on outpatient basis - may need to increase frequency of infusions    B12 deficiency  Assessment & Plan  Setting of chronic postop malabsorption due to hx of gastric bypass  Continue prior to admission medications    Fatigue  Assessment & Plan  Persistent fatigue for the past few days-patient reports feeling like this when she is hypokalemic  Please see treatment plan for hypokalemia  Provide supportive care       VTE Pharmacologic Prophylaxis:   Pharmacologic: none, low risk  Mechanical VTE Prophylaxis in Place: No    Patient Centered Rounds: I have performed bedside rounds with nursing staff today  Discussions with Specialists or Other Care Team Provider: Nephrology    Education and Discussions with Family / Patient: Patient    Time Spent for Care: 30 minutes    More than 50% of total time spent on counseling and coordination of care as described above  Current Length of Stay: 0 day(s)    Current Patient Status: Inpatient   Certification Statement: The patient, admitted on an observation basis, will now require > 2 midnight hospital stay due to need for IV KCl, close monitoring    Discharge Plan: tomorrow    Code Status: Level 1 - Full Code      Subjective:   Patient seen and examined  She reports feeling improved overall  Denies GI losses such as vomiting or diarrhea  Reports improvement in her fatigue  Objective:     Vitals:   Temp (24hrs), Av 1 °F (36 7 °C), Min:97 8 °F (36 6 °C), Max:98 5 °F (36 9 °C)    Temp:  [97 8 °F (36 6 °C)-98 5 °F (36 9 °C)] 98 5 °F (36 9 °C)  HR:  [64-99] 84  Resp:  [14-18] 16  BP: (102-120)/(60-84) 106/80  SpO2:  [97 %-100 %] 100 %  Body mass index is 27 7 kg/m²  Input and Output Summary (last 24 hours): Intake/Output Summary (Last 24 hours) at 2019 1517  Last data filed at 2019 1446  Gross per 24 hour   Intake 2667 91 ml   Output    Net 2667 91 ml       Physical Exam:     Physical Exam   Constitutional: She is oriented to person, place, and time  No distress  HENT:   Head: Normocephalic and atraumatic  Eyes: Conjunctivae are normal    Neck: No JVD present  Cardiovascular: Normal rate and regular rhythm  No murmur heard  Pulmonary/Chest: Effort normal  No respiratory distress  She has no wheezes  She has no rales  Abdominal: Soft  She exhibits no distension  There is no guarding  Musculoskeletal: She exhibits no edema  Neurological: She is alert and oriented to person, place, and time  Skin: Skin is warm and dry  Psychiatric: She has a normal mood and affect         Additional Data:     Labs:    Results from last 7 days   Lab Units 19  0517 19  1753   WBC Thousand/uL 7 20 9 34   HEMOGLOBIN g/dL 12 1 14 1   HEMATOCRIT % 36 5 41 9   PLATELETS Thousands/uL 267 292   NEUTROS PCT %  --  63   LYMPHS PCT %  --  25 MONOS PCT %  --  9   EOS PCT %  --  2     Results from last 7 days   Lab Units 08/06/19  1213 08/06/19  0517  08/05/19  1753   SODIUM mmol/L  --  140  --  139   POTASSIUM mmol/L 3 4* 2 4*   < > 1 9*   CHLORIDE mmol/L  --  105  --  98*   CO2 mmol/L  --  26  --  27   BUN mg/dL  --  20  --  23   CREATININE mg/dL  --  1 06  --  1 33*   ANION GAP mmol/L  --  9  --  14*   CALCIUM mg/dL  --  8 0*  --  8 4   ALBUMIN g/dL  --   --   --  3 7   TOTAL BILIRUBIN mg/dL  --   --   --  0 30   ALK PHOS U/L  --   --   --  55   ALT U/L  --   --   --  29   AST U/L  --   --   --  31   GLUCOSE RANDOM mg/dL  --  85  --  91    < > = values in this interval not displayed  * I Have Reviewed All Lab Data Listed Above  * Additional Pertinent Lab Tests Reviewed: Veto 66 Admission Reviewed    Imaging:    Imaging Reports Reviewed Today Include: No new      Recent Cultures (last 7 days):           Last 24 Hours Medication List:     Current Facility-Administered Medications:  AMILoride 5 mg Oral Daily Anusha Y Swank, KWAMENP    amitriptyline 50 mg Oral HS Anusha Y Swank, CRNP    ferrous sulfate 325 mg Oral Every Other Day Anusha Y Swank, CRNP    meclizine 25 mg Oral HS Anusha Y Swank, CRNP    potassium chloride 20 mEq Intravenous Q1H Maxi Peterson MD Last Rate: 20 mEq (08/06/19 1448)   sodium chloride 75 mL/hr Intravenous Continuous Nadja Pollard MD Last Rate: 75 mL/hr (08/06/19 1446)   thiamine 100 mg Oral Daily Anusha Y Swank, KWAMENP    zinc sulfate 220 mg Oral Daily ARIADNA Hood         Today, Patient Was Seen By: Chadd Edmonds MD    ** Please Note: Dictation voice to text software may have been used in the creation of this document   **

## 2019-08-07 LAB
ANION GAP SERPL CALCULATED.3IONS-SCNC: 10 MMOL/L (ref 4–13)
ANION GAP SERPL CALCULATED.3IONS-SCNC: 11 MMOL/L (ref 4–13)
BUN SERPL-MCNC: 12 MG/DL (ref 5–25)
BUN SERPL-MCNC: 14 MG/DL (ref 5–25)
CALCIUM SERPL-MCNC: 7.6 MG/DL (ref 8.3–10.1)
CALCIUM SERPL-MCNC: 7.9 MG/DL (ref 8.3–10.1)
CHLORIDE SERPL-SCNC: 107 MMOL/L (ref 100–108)
CHLORIDE SERPL-SCNC: 107 MMOL/L (ref 100–108)
CO2 SERPL-SCNC: 20 MMOL/L (ref 21–32)
CO2 SERPL-SCNC: 23 MMOL/L (ref 21–32)
CREAT SERPL-MCNC: 0.88 MG/DL (ref 0.6–1.3)
CREAT SERPL-MCNC: 0.92 MG/DL (ref 0.6–1.3)
GFR SERPL CREATININE-BSD FRML MDRD: 81 ML/MIN/1.73SQ M
GFR SERPL CREATININE-BSD FRML MDRD: 85 ML/MIN/1.73SQ M
GLUCOSE SERPL-MCNC: 160 MG/DL (ref 65–140)
GLUCOSE SERPL-MCNC: 84 MG/DL (ref 65–140)
PHOSPHATE SERPL-MCNC: 3.4 MG/DL (ref 2.7–4.5)
POTASSIUM SERPL-SCNC: 2.6 MMOL/L (ref 3.5–5.3)
POTASSIUM SERPL-SCNC: 4 MMOL/L (ref 3.5–5.3)
SODIUM SERPL-SCNC: 138 MMOL/L (ref 136–145)
SODIUM SERPL-SCNC: 140 MMOL/L (ref 136–145)

## 2019-08-07 PROCEDURE — 99232 SBSQ HOSP IP/OBS MODERATE 35: CPT | Performed by: FAMILY MEDICINE

## 2019-08-07 PROCEDURE — 84100 ASSAY OF PHOSPHORUS: CPT | Performed by: INTERNAL MEDICINE

## 2019-08-07 PROCEDURE — 80048 BASIC METABOLIC PNL TOTAL CA: CPT | Performed by: FAMILY MEDICINE

## 2019-08-07 RX ORDER — POTASSIUM CHLORIDE 20MEQ/15ML
60 LIQUID (ML) ORAL
Status: DISCONTINUED | OUTPATIENT
Start: 2019-08-07 | End: 2019-08-08 | Stop reason: HOSPADM

## 2019-08-07 RX ORDER — POTASSIUM CHLORIDE 14.9 MG/ML
40 INJECTION INTRAVENOUS
Status: DISCONTINUED | OUTPATIENT
Start: 2019-08-07 | End: 2019-08-07

## 2019-08-07 RX ORDER — POTASSIUM CHLORIDE 14.9 MG/ML
20 INJECTION INTRAVENOUS
Status: COMPLETED | OUTPATIENT
Start: 2019-08-07 | End: 2019-08-07

## 2019-08-07 RX ADMIN — AMILORIDE HYDROCHLORIDE 5 MG: 5 TABLET ORAL at 08:51

## 2019-08-07 RX ADMIN — POTASSIUM CHLORIDE 20 MEQ: 14.9 INJECTION, SOLUTION INTRAVENOUS at 11:50

## 2019-08-07 RX ADMIN — POTASSIUM CHLORIDE 60 MEQ: 20 SOLUTION ORAL at 13:55

## 2019-08-07 RX ADMIN — MECLIZINE HYDROCHLORIDE 25 MG: 25 TABLET ORAL at 21:28

## 2019-08-07 RX ADMIN — POTASSIUM CHLORIDE 20 MEQ: 14.9 INJECTION, SOLUTION INTRAVENOUS at 18:20

## 2019-08-07 RX ADMIN — POTASSIUM CHLORIDE 20 MEQ: 14.9 INJECTION, SOLUTION INTRAVENOUS at 16:08

## 2019-08-07 RX ADMIN — POTASSIUM CHLORIDE 60 MEQ: 20 SOLUTION ORAL at 21:28

## 2019-08-07 RX ADMIN — POTASSIUM CHLORIDE 60 MEQ: 20 SOLUTION ORAL at 10:03

## 2019-08-07 RX ADMIN — POTASSIUM CHLORIDE 20 MEQ: 14.9 INJECTION, SOLUTION INTRAVENOUS at 13:55

## 2019-08-07 RX ADMIN — Medication 100 MG: at 08:51

## 2019-08-07 RX ADMIN — POTASSIUM CHLORIDE 60 MEQ: 20 SOLUTION ORAL at 18:21

## 2019-08-07 RX ADMIN — Medication 220 MG: at 08:51

## 2019-08-07 RX ADMIN — AMITRIPTYLINE HYDROCHLORIDE 50 MG: 50 TABLET, FILM COATED ORAL at 21:28

## 2019-08-07 NOTE — PROGRESS NOTES
Progress Note - Glen Perry 1985, 27 y o  female MRN: [id number]    Unit/Bed#: 244-98 Encounter: [id number]    Primary Care Provider: Anais Marks DO   Date and time admitted to hospital: 8/5/2019  4:40 PM        * Hypokalemia  Assessment & Plan  Recurrent hospitalizations despite receiving scheduled IV infusions outpatient - may need to increase frequency  Potassium 1 9 on admission - improved to 3 4 yesterday, again a drop to 2 6 this am - will give KCl 80 mg IV BID and resumed home KCl 60 meq 5 times daily  BMP at 1800, BMP and Mg level in am  Nephrology consulted, appreciate input  Continue amiloride  Patient referred to specialized clinic/diagnostic dilemmas clinic at [facility] for recommendations and will be seen there upon changing of insurance provider    Acute kidney injury [facility])  Assessment & Plan  Resolved with IVF, d/c IVF  Check BMP in am    Other intestinal malabsorption  Assessment & Plan  a/e/b vitamin, mineral, and element abnormalities, in the setting of a gastric bypass patient - requiring IV NSS, K+, Cl-, Fe, thiamine, nephrovite, and D2 - and daily CMP/BMPs  Recurrent admissions with hypokalemia despite receiving KCl IV infusions on outpatient basis - may need to increase frequency of infusions outpatient    Fatigue  Assessment & Plan  Improved with improvement of potassium level      VTE Pharmacologic Prophylaxis:   Pharmacologic: none, low risk  Mechanical VTE Prophylaxis in Place: No    Patient Centered Rounds: I have performed bedside rounds with nursing staff today  Discussions with Specialists or Other Care Team Provider: Patient    Education and Discussions with Family / Patient: 30 min    Time Spent for Care: 30 minutes  More than 50% of total time spent on counseling and coordination of care as described above      Current Length of Stay: 1 day(s)    Current Patient Status: Inpatient   Certification Statement: The patient will continue to require additional inpatient hospital stay due to need for close monitoring, IV KCl    Discharge Plan: Tomorrow    Code Status: Level 1 - Full Code      Subjective:   Patient seen and examined  She reports feeling better overall  NO overnight events  Objective:     Vitals:   Temp (24hrs), Av 4 °F (36 9 °C), Min:98 1 °F (36 7 °C), Max:98 5 °F (36 9 °C)    Temp:  [98 1 °F (36 7 °C)-98 5 °F (36 9 °C)] 98 1 °F (36 7 °C)  HR:  [77-85] 77  Resp:  [16-18] 18  BP: ()/(59-80) 98/60  SpO2:  [99 %-100 %] 99 %  Body mass index is 28 02 kg/m²  Input and Output Summary (last 24 hours): Intake/Output Summary (Last 24 hours) at 2019 1455  Last data filed at 2019 1300  Gross per 24 hour   Intake 720 ml   Output    Net 720 ml       Physical Exam:     Physical Exam   Constitutional: She is oriented to person, place, and time  No distress  HENT:   Head: Normocephalic and atraumatic  Eyes: Conjunctivae are normal    Neck: No JVD present  Cardiovascular: Normal rate and regular rhythm  No murmur heard  Pulmonary/Chest: Effort normal  No respiratory distress  She has no wheezes  She has no rales  Abdominal: Soft  She exhibits no distension  There is no guarding  Musculoskeletal: She exhibits no edema  Neurological: She is alert and oriented to person, place, and time  Skin: Skin is warm and dry  Psychiatric: She has a normal mood and affect          Additional Data:     Labs:    Results from last 7 days   Lab Units 19  0517 19  1753   WBC Thousand/uL 7 20 9 34   HEMOGLOBIN g/dL 12 1 14 1   HEMATOCRIT % 36 5 41 9   PLATELETS Thousands/uL 267 292   NEUTROS PCT %  --  63   LYMPHS PCT %  --  25   MONOS PCT %  --  9   EOS PCT %  --  2     Results from last 7 days   Lab Units 19  0531  19  1753   SODIUM mmol/L 140   < > 139   POTASSIUM mmol/L 2 6*   < > 1 9*   CHLORIDE mmol/L 107   < > 98*   CO2 mmol/L 23   < > 27   BUN mg/dL 12   < > 23   CREATININE mg/dL 0 92   < > 1 33*   ANION GAP mmol/L 10   < > 14*   CALCIUM mg/dL 7 9*   < > 8 4   ALBUMIN g/dL  --   --  3 7   TOTAL BILIRUBIN mg/dL  --   --  0 30   ALK PHOS U/L  --   --  55   ALT U/L  --   --  29   AST U/L  --   --  31   GLUCOSE RANDOM mg/dL 84   < > 91    < > = values in this interval not displayed  * I Have Reviewed All Lab Data Listed Above  * Additional Pertinent Lab Tests Reviewed: Veto 66 Admission Reviewed    Imaging:    Imaging Reports Reviewed Today Include: No new      Recent Cultures (last 7 days):           Last 24 Hours Medication List:     Current Facility-Administered Medications:  AMILoride 5 mg Oral Daily Anusha Y Swank, CRNP    amitriptyline 50 mg Oral HS Anusha Y Swank, CRNP    ferrous sulfate 325 mg Oral Every Other Day Anusha Y Swank, CRNP    meclizine 25 mg Oral HS Anusha Y Swank, CRNP    potassium chloride 60 mEq Oral 5x Daily Michelle Dyer MD    potassium chloride 20 mEq Intravenous Q2H Hugh Cuevas MD Last Rate: 20 mEq (08/07/19 1355)   thiamine 100 mg Oral Daily Anusha Y Swank, KWAMENP    zinc sulfate 220 mg Oral Daily ARIADNA Bobo         Today, Patient Was Seen By: La Nena Monte MD    ** Please Note: Dictation voice to text software may have been used in the creation of this document   **

## 2019-08-07 NOTE — PROGRESS NOTES
Progress Note - Nephrology   Alvina Pate 27 y o  female MRN: [id number]  Unit/Bed#: 671-00 Encounter: [id number]    A/P:  1  Hypokalemia: potassium is 2 6 in spite of IV K-riders and oral potassium solution  Will receive KCl 80 meq IV today and potassium check at 6 pm   2  Acute kidney injury: resolved  3  Hypophosphatemia: resolved      Follow up reason for today's visit: hypokalemia    Hypokalemia    Patient Active Problem List   Diagnosis    Hypokalemia    History of gastric bypass    Migraine without aura and without status migrainosus, not intractable    Left-sided weakness    Hypophosphatemia    Anemia    Vitamin D deficiency    Weakness of both lower extremities    Weakness of both upper extremities    Elevated CPK    Vitamin B12 deficiency    Cervical lymphadenopathy    Fatigue    Paresthesia of both lower extremities    Paresthesias    B12 deficiency    Gastric bypass status for obesity    GERD (gastroesophageal reflux disease)    Migraine    WAGNER (obstructive sleep apnea)    Other intestinal malabsorption    Acute kidney injury ([address] )    Right ovarian cyst    Chest pain    Intractable nausea and vomiting    Vertigo    Stress fracture of right foot with routine healing         Subjective:   10 point ROS -neg    Objective:     Vitals: Blood pressure 98/60, pulse 77, temperature 98 1 °F (36 7 °C), temperature source Oral, resp  rate 18, height 5' 2" (1 575 m), weight 69 5 kg (153 lb 3 2 oz), SpO2 99 %  ,Body mass index is 28 02 kg/m²  Weight (last 2 days)     Date/Time   Weight    08/07/19 0600   69 5 (153 2)    08/06/19 0600   68 7 (151 46)    08/05/19 21:07:12   69 (152 12)    08/05/19 1645   69 3 (152 78)                Intake/Output Summary (Last 24 hours) at 8/7/2019 1056  Last data filed at 8/7/2019 0900  Gross per 24 hour   Intake 1375 41 ml   Output    Net 1375 41 ml     I/O last 3 completed shifts: In: 2667 9 [P O :720; I V :1547 9;  IV Piggyback:400]  Out: - Physical Exam: BP 98/60 (BP Location: Right arm)   Pulse 77   Temp 98 1 °F (36 7 °C) (Oral)   Resp 18   Ht 5' 2" (1 575 m)   Wt 69 5 kg (153 lb 3 2 oz)   SpO2 99%   BMI 28 02 kg/m²     General Appearance:    Alert, cooperative, no distress, appears stated age   Head:    Normocephalic, without obvious abnormality, atraumatic   Eyes:    Conjunctiva/corneas clear   Ears:    Normal external ears   Nose:   Nares normal, septum midline, mucosa normal, no drainage    or sinus tenderness   Throat:   Lips, mucosa, and tongue normal; teeth and gums normal   Neck:   Supple, symmetrical, trachea midline, no adenopathy;        thyroid:  No enlargement/tenderness/nodules; no carotid    bruit or JVD   Back:     Symmetric, no curvature, ROM normal, no CVA tenderness   Lungs:     Clear to auscultation bilaterally, respirations unlabored   Chest wall:    No tenderness or deformity   Heart:    Regular rate and rhythm, S1 and S2 normal, no murmur, rub   or gallop   Abdomen:     Soft, non-tender, bowel sounds active   Extremities:   Extremities normal, atraumatic, no cyanosis or edema   Skin:   Skin color, texture, turgor normal, no rashes or lesions   Lymph nodes:   Cervical normal   Neurologic:   CNII-XII intact            Lab, Imaging and other studies: I have personally reviewed pertinent labs  CBC: No results found for: WBC, HGB, HCT, MCV, PLT, ADJUSTEDWBC, MCH, MCHC, RDW, MPV, NRBC  CMP:   Lab Results   Component Value Date    K 2 6 (LL) 08/07/2019     08/07/2019    CO2 23 08/07/2019    BUN 12 08/07/2019    CREATININE 0 92 08/07/2019    CALCIUM 7 9 (L) 08/07/2019    EGFR 81 08/07/2019           Results from last 7 days   Lab Units 08/07/19  0531 08/06/19  1213 08/06/19  0517  08/05/19  1753   POTASSIUM mmol/L 2 6* 3 4* 2 4*   < > 1 9*   CHLORIDE mmol/L 107  --  105  --  98*   CO2 mmol/L 23  --  26  --  27   BUN mg/dL 12  --  20  --  23   CREATININE mg/dL 0 92  --  1 06  --  1 33*   CALCIUM mg/dL 7 9*  --  8 0*  -- 8  4   ALK PHOS U/L  --   --   --   --  55   ALT U/L  --   --   --   --  29   AST U/L  --   --   --   --  31    < > = values in this interval not displayed  Phosphorus:   Lab Results   Component Value Date    PHOS 3 4 08/07/2019     Magnesium: No results found for: MG  Urinalysis: No results found for: Nicole Sunil, SPECGRAV, PHUR, LEUKOCYTESUR, NITRITE, PROTEINUA, GLUCOSEU, KETONESU, BILIRUBINUR, BLOODU  Ionized Calcium: No results found for: CAION  Coagulation: No results found for: PT, INR, APTT  Troponin: No results found for: TROPONINI  ABG: No results found for: PHART, FOX2WUD, PO2ART, VBH7RBE, E7WHKEXF, BEART, SOURCE  Radiology review:     IMAGING  Procedure: Xr Chest 2 Views    Result Date: 8/5/2019  Narrative: CHEST INDICATION:   cp  COMPARISON:  1/31/2019 EXAM PERFORMED/VIEWS:  XR CHEST PA & LATERAL FINDINGS:  Right chest port unchanged  Cardiomediastinal silhouette appears stable  The lungs are clear  No pneumothorax or pleural effusion  Osseous structures appear within normal limits for patient age  Impression: No acute cardiopulmonary disease   Workstation performed: [id number]       Current Facility-Administered Medications   Medication Dose Route Frequency    AMILoride tablet 5 mg  5 mg Oral Daily    amitriptyline (ELAVIL) tablet 50 mg  50 mg Oral HS    ferrous sulfate tablet 325 mg  325 mg Oral Every Other Day    meclizine (ANTIVERT) tablet 25 mg  25 mg Oral HS    potassium chloride 10 % oral solution 60 mEq  60 mEq Oral 5x Daily    potassium chloride 20 mEq IVPB (premix)  20 mEq Intravenous Q2H    sodium chloride 0 9 % infusion  75 mL/hr Intravenous Continuous    thiamine (VITAMIN B1) tablet 100 mg  100 mg Oral Daily    zinc sulfate (ZINCATE) capsule 220 mg  220 mg Oral Daily     Medications Discontinued During This Encounter   Medication Reason    zinc gluconate tablet 50 mg Availability    potassium chloride 20 mEq IVPB (premix)     potassium chloride 20 mEq IVPB (premix)        Lois العراقي MD

## 2019-08-07 NOTE — ASSESSMENT & PLAN NOTE
a/e/b vitamin, mineral, and element abnormalities, in the setting of a gastric bypass patient - requiring IV NSS, K+, Cl-, Fe, thiamine, nephrovite, and D2 - and daily CMP/BMPs       Recurrent admissions with hypokalemia despite receiving KCl IV infusions on outpatient basis - may need to increase frequency of infusions outpatient

## 2019-08-07 NOTE — ASSESSMENT & PLAN NOTE
Recurrent hospitalizations despite receiving scheduled IV infusions outpatient - may need to increase frequency  Potassium 1 9 on admission - improved to 3 4 yesterday, again a drop to 2 6 this am - will give KCl 80 mg IV BID and resumed home KCl 60 meq 5 times daily  BMP at 1800, BMP and Mg level in am  Nephrology consulted, appreciate input  Continue amiloride  Patient referred to specialized clinic/diagnostic dilemmas clinic at [facility] for recommendations and will be seen there upon changing of insurance provider

## 2019-08-08 ENCOUNTER — HOSPITAL ENCOUNTER (OUTPATIENT)
Dept: INFUSION CENTER | Facility: HOSPITAL | Age: 34
Discharge: HOME | End: 2019-08-08

## 2019-08-08 VITALS
HEIGHT: 62 IN | RESPIRATION RATE: 18 BRPM | SYSTOLIC BLOOD PRESSURE: 106 MMHG | HEART RATE: 78 BPM | TEMPERATURE: 98 F | BODY MASS INDEX: 28.32 KG/M2 | DIASTOLIC BLOOD PRESSURE: 66 MMHG | OXYGEN SATURATION: 100 % | WEIGHT: 153.88 LBS

## 2019-08-08 LAB
ANION GAP SERPL CALCULATED.3IONS-SCNC: 10 MMOL/L (ref 4–13)
BUN SERPL-MCNC: 12 MG/DL (ref 5–25)
CALCIUM SERPL-MCNC: 8 MG/DL (ref 8.3–10.1)
CHLORIDE SERPL-SCNC: 109 MMOL/L (ref 100–108)
CO2 SERPL-SCNC: 21 MMOL/L (ref 21–32)
CREAT SERPL-MCNC: 0.84 MG/DL (ref 0.6–1.3)
GFR SERPL CREATININE-BSD FRML MDRD: 90 ML/MIN/1.73SQ M
GLUCOSE SERPL-MCNC: 84 MG/DL (ref 65–140)
MAGNESIUM SERPL-MCNC: 1.9 MG/DL (ref 1.6–2.6)
POTASSIUM SERPL-SCNC: 3 MMOL/L (ref 3.5–5.3)
POTASSIUM SERPL-SCNC: 4.5 MMOL/L (ref 3.5–5.3)
SODIUM SERPL-SCNC: 140 MMOL/L (ref 136–145)

## 2019-08-08 PROCEDURE — 99239 HOSP IP/OBS DSCHRG MGMT >30: CPT | Performed by: FAMILY MEDICINE

## 2019-08-08 PROCEDURE — 80048 BASIC METABOLIC PNL TOTAL CA: CPT | Performed by: FAMILY MEDICINE

## 2019-08-08 PROCEDURE — 83735 ASSAY OF MAGNESIUM: CPT | Performed by: FAMILY MEDICINE

## 2019-08-08 PROCEDURE — 84132 ASSAY OF SERUM POTASSIUM: CPT | Performed by: FAMILY MEDICINE

## 2019-08-08 RX ORDER — ZINC SULFATE 50(220)MG
220 CAPSULE ORAL DAILY
Qty: 30 CAPSULE | Refills: 0 | Status: ON HOLD | OUTPATIENT
Start: 2019-08-09 | End: 2020-09-16

## 2019-08-08 RX ORDER — POTASSIUM CHLORIDE 14.9 MG/ML
20 INJECTION INTRAVENOUS
Status: COMPLETED | OUTPATIENT
Start: 2019-08-08 | End: 2019-08-08

## 2019-08-08 RX ORDER — POTASSIUM CHLORIDE 14.9 MG/ML
20 INJECTION INTRAVENOUS
Status: DISCONTINUED | OUTPATIENT
Start: 2019-08-08 | End: 2019-08-08

## 2019-08-08 RX ADMIN — POTASSIUM CHLORIDE 20 MEQ: 200 INJECTION, SOLUTION INTRAVENOUS at 11:55

## 2019-08-08 RX ADMIN — POTASSIUM CHLORIDE 20 MEQ: 200 INJECTION, SOLUTION INTRAVENOUS at 13:08

## 2019-08-08 RX ADMIN — POTASSIUM CHLORIDE 20 MEQ: 14.9 INJECTION, SOLUTION INTRAVENOUS at 08:16

## 2019-08-08 RX ADMIN — FERROUS SULFATE TAB 325 MG (65 MG ELEMENTAL FE) 325 MG: 325 (65 FE) TAB at 08:56

## 2019-08-08 RX ADMIN — POTASSIUM CHLORIDE 20 MEQ: 14.9 INJECTION, SOLUTION INTRAVENOUS at 10:34

## 2019-08-08 RX ADMIN — POTASSIUM CHLORIDE 60 MEQ: 20 SOLUTION ORAL at 06:06

## 2019-08-08 RX ADMIN — POTASSIUM CHLORIDE 20 MEQ: 200 INJECTION, SOLUTION INTRAVENOUS at 10:40

## 2019-08-08 RX ADMIN — POTASSIUM CHLORIDE 60 MEQ: 20 SOLUTION ORAL at 14:34

## 2019-08-08 RX ADMIN — Medication 100 MG: at 08:56

## 2019-08-08 RX ADMIN — POTASSIUM CHLORIDE 60 MEQ: 20 SOLUTION ORAL at 11:18

## 2019-08-08 RX ADMIN — AMILORIDE HYDROCHLORIDE 5 MG: 5 TABLET ORAL at 08:56

## 2019-08-08 RX ADMIN — Medication 220 MG: at 08:56

## 2019-08-08 NOTE — SOCIAL WORK
Discussed with patient preferences on discharge;understanding how to manage health at home; purpose of taking medications; importance of follow up care/appointments; and symptoms to watch out for once discharged home  Pt is being dc'd home on this date with outpatient follow up  Called PCP office:Dr Ricketts who stated Dr is away on vacation but they will relay the message to Dr Yrn Taylor regarding pt needing a follow up appointment

## 2019-08-08 NOTE — ASSESSMENT & PLAN NOTE
a/e/b vitamin, mineral, and element abnormalities, in the setting of a gastric bypass patient - requiring IV NSS, K+, Cl-, Fe, thiamine, nephrovite, and D2 - and daily CMP/BMPs       Recurrent admissions with hypokalemia despite receiving KCl IV infusions on outpatient basis - may need to increase frequency of infusions outpatient - appreciate CM and Nephrology input

## 2019-08-08 NOTE — SOCIAL WORK
CM checking with [facility] as Dr's request to see about getting gpt set up for bi-weekly infusions  Spoke with Ashwini Vargas who stated this will need to go through Dr Michael Bansal and office is already closed for the day  Ashwini Vargas will be speaking with her manager:Destiny and will get back to CM

## 2019-08-08 NOTE — ASSESSMENT & PLAN NOTE
Recurrent hospitalizations despite receiving scheduled IV infusions outpatient - may need to increase frequency  Potassium 1 9 on admission - improved to 4 yesterday, again a drop to 3 this am - received KCl 80 mg IV and can be discharged if K > 3 5  Continue KCl 60 meq 5 times daily  Nephrology consulted, appreciate input  Continue amiloride  Patient referred to specialized clinic/diagnostic dilemmas clinic at [facility] for recommendations and will be seen there upon changing of insurance provider

## 2019-08-08 NOTE — DISCHARGE SUMMARY
Discharge- Rivas Vargas 1985, 27 y o  female MRN: [id number]    Unit/Bed#: 515-40 Encounter: [id number]    Primary Care Provider: Mahsa Calderon DO   Date and time admitted to hospital: 8/5/2019  4:40 PM        * Hypokalemia  Assessment & Plan  Recurrent hospitalizations despite receiving scheduled IV infusions outpatient - may need to increase frequency  Potassium 1 9 on admission - improved to 4 yesterday, again a drop to 3 this am - received KCl 80 mg IV and can be discharged if K > 3 5  Continue KCl 60 meq 5 times daily  Nephrology consulted, appreciate input  Continue amiloride  Patient referred to specialized clinic/diagnostic dilemmas clinic at [facility] for recommendations and will be seen there upon changing of insurance provider    Acute kidney injury [facility])  Assessment & Plan  Resolved with IVF, d/c IVF  Check BMP on Monday    Other intestinal malabsorption  Assessment & Plan  a/e/b vitamin, mineral, and element abnormalities, in the setting of a gastric bypass patient - requiring IV NSS, K+, Cl-, Fe, thiamine, nephrovite, and D2 - and daily CMP/BMPs       Recurrent admissions with hypokalemia despite receiving KCl IV infusions on outpatient basis - may need to increase frequency of infusions outpatient - appreciate CM and Nephrology input    B12 deficiency  Assessment & Plan  Setting of chronic postop malabsorption due to hx of gastric bypass  Continue prior to admission medications    Fatigue  Assessment & Plan  Improved with improvement of potassium level        Discharging Physician / Practitioner: Jayce Zamudio MD  PCP: Mahsa Calderon DO  Admission Date:   Admission Orders (From admission, onward)     Ordered        08/06/19 1127  Inpatient Admission  Once         08/05/19 2003  Place in Observation  Once                   Discharge Date: 08/08/19    Resolved Problems  Date Reviewed: 8/8/2019    None          Consultations During Hospital Stay:  · Nephrology, Case management    Procedures Performed:   XR chest 2 views   ED Interpretation by Ronna Lew MD (08/05 1850)   No infiltrates  No congestive heart failure  Final Result by Collette Montoya MD (08/05 2130)      No acute cardiopulmonary disease  Workstation performed: [id number]             Significant Findings / Test Results:   Results from last 7 days   Lab Units 08/06/19  0517   WBC Thousand/uL 7 20   HEMOGLOBIN g/dL 12 1   HEMATOCRIT % 36 5   PLATELETS Thousands/uL 267         Incidental Findings:   · None     Test Results Pending at Discharge (will require follow up): · None     Outpatient Tests Requested:  · BMP    Complications:  None    Reason for Admission: fatigue    Hospital Course: Brian Rojas is a 27 y o  female patient with history of 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 who originally presented to the hospital on 8/5/2019 due to generalized weakness and fatigue which she stated she develops with a drop in her potassium level  The patient was in fact found to have severe hypokalemia with potassium level at 1 9 on admission  The patient received multiple doses of IV potassium with her potassium improving to 4 5 at the time of the discharge  The patient was recommended to increase her IV potassium infusions from once weekly to twice weekly  The patient is to continue high dose of oral potassium supplementation  Return precautions discussed  Please see above list of diagnoses and related plan for additional information  Condition at Discharge: good     Discharge Day Visit / Exam:     Subjective:  Patient seen and examined  She reports feeling well and would like to go home  She denies nausea vomiting, denies diarrhea constipation, denies chest pain, shortness of breath or palpitations  No overnight events  Good appetite      Vitals: Blood Pressure: 106/66 (08/08/19 0708)  Pulse: 78 (08/08/19 3054)  Temperature: 98 °F (36 7 °C) (08/08/19 0708)  Temp Source: Oral (08/08/19 0708)  Respirations: 18 (08/08/19 0708)  Height: 5' 2" (157 5 cm) (08/05/19 2107)  Weight - Scale: 69 8 kg (153 lb 14 1 oz) (08/08/19 0600)  SpO2: 100 % (08/08/19 0708)    Exam:     Physical Exam   Constitutional: She is oriented to person, place, and time  No distress  HENT:   Head: Normocephalic and atraumatic  Eyes: Conjunctivae are normal    Neck: No JVD present  Cardiovascular: Normal rate and regular rhythm  No murmur heard  Pulmonary/Chest: Effort normal  No respiratory distress  She has no wheezes  She has no rales  Abdominal: Soft  She exhibits no distension  There is no guarding  Musculoskeletal: She exhibits no edema  Neurological: She is alert and oriented to person, place, and time  Skin: Skin is warm and dry  Psychiatric: She has a normal mood and affect  Discussion with Family:  Patient will update family    Discharge instructions/Information to patient and family:   See after visit summary for information provided to patient and family  Provisions for Follow-Up Care:  See after visit summary for information related to follow-up care and any pertinent home health orders  Disposition:     Home    For Discharges to [facility] SNF:   · Not Applicable to this Patient - Not Applicable to this Patient    Planned Readmission: No     Discharge Statement:  I spent 35 minutes discharging the patient  This time was spent on the day of discharge  I had direct contact with the patient on the day of discharge  Greater than 50% of the total time was spent examining patient, answering all patient questions, arranging and discussing plan of care with patient as well as directly providing post-discharge instructions  Additional time then spent on discharge activities  Discharge Medications:  See after visit summary for reconciled discharge medications provided to patient and family        ** Please Note: This note has been constructed using a voice recognition system **

## 2019-08-08 NOTE — PLAN OF CARE
Problem: Potential for Falls  Goal: Patient will remain free of falls  Description  INTERVENTIONS:  - Assess patient frequently for physical needs  -  Identify cognitive and physical deficits and behaviors that affect risk of falls  -  [place] fall precautions as indicated by assessment   - Educate patient/family on patient safety including physical limitations  - Instruct patient to call for assistance with activity based on assessment  - Modify environment to reduce risk of injury  - Consider OT/PT consult to assist with strengthening/mobility  Outcome: Adequate for Discharge     Problem: PAIN - ADULT  Goal: Verbalizes/displays adequate comfort level or baseline comfort level  Description  Interventions:  - Encourage patient to monitor pain and request assistance  - Assess pain using appropriate pain scale  - Administer analgesics based on type and severity of pain and evaluate response  - Implement non-pharmacological measures as appropriate and evaluate response  - Consider cultural and social influences on pain and pain management  - Notify physician/advanced practitioner if interventions unsuccessful or patient reports new pain  Outcome: Adequate for Discharge     Problem: INFECTION - ADULT  Goal: Absence or prevention of progression during hospitalization  Description  INTERVENTIONS:  - Assess and monitor for signs and symptoms of infection  - Monitor lab/diagnostic results  - [place] appropriate cooling/warming therapies per order  - Administer medications as ordered  - Instruct and encourage patient and family to use good hand hygiene technique   Outcome: Adequate for Discharge     Problem: SAFETY ADULT  Goal: Patient will remain free of falls  Description  INTERVENTIONS:  - Assess patient frequently for physical needs  -  Identify cognitive and physical deficits and behaviors that affect risk of falls    -  [place] fall precautions as indicated by assessment   - Educate patient/family on patient safety including physical limitations  - Instruct patient to call for assistance with activity based on assessment  - Modify environment to reduce risk of injury  - Consider OT/PT consult to assist with strengthening/mobility  Outcome: Adequate for Discharge  Goal: Maintain or return to baseline ADL function  Description  INTERVENTIONS:  -  Assess patient's ability to carry out ADLs; assess patient's baseline for ADL function and identify physical deficits which impact ability to perform ADLs (bathing, care of mouth/teeth, toileting, grooming, dressing, etc )  - Assess/evaluate cause of self-care deficits   - Assess range of motion  - Assess patient's mobility; develop plan if impaired  - Assess patient's need for assistive devices and provide as appropriate  - Encourage maximum independence but intervene and supervise when necessary  ¯ Involve family in performance of ADLs  ¯ Assess for home care needs following discharge   ¯ Request OT consult to assist with ADL evaluation and planning for discharge  ¯ Provide patient education as appropriate  Outcome: Adequate for Discharge  Goal: Maintain or return mobility status to optimal level  Description  INTERVENTIONS:  - Assess patient's baseline mobility status (ambulation, transfers, stairs, etc )    - Identify cognitive and physical deficits and behaviors that affect mobility  - Identify mobility aids required to assist with transfers and/or ambulation (gait belt, sit-to-stand, lift, walker, cane, etc )  - [place] fall precautions as indicated by assessment  - Record patient progress and toleration of activity level on Mobility SBAR; progress patient to next Phase/Stage  - Instruct patient to call for assistance with activity based on assessment  - Request Rehabilitation consult to assist with strengthening/weightbearing, etc   Outcome: Adequate for Discharge     Problem: DISCHARGE PLANNING  Goal: Discharge to home or other facility with appropriate resources  Description  INTERVENTIONS:  - Identify barriers to discharge w/patient and caregiver  - Arrange for needed discharge resources and transportation as appropriate  - Identify discharge learning needs (meds)  - Arrange for interpretive services to assist at discharge as needed  - Refer to Case Management Department for coordinating discharge planning if the patient needs post-hospital services based on physician/advanced practitioner order or complex needs related to functional status, cognitive ability, or social support system   Outcome: Adequate for Discharge     Problem: Knowledge Deficit  Goal: Patient/family/caregiver demonstrates understanding of disease process, treatment plan, medications, and discharge instructions  Description  Complete learning assessment and assess knowledge base  Interventions:  - Provide teaching at level of understanding  - Provide teaching via preferred learning methods  Outcome: Adequate for Discharge     Problem: CARDIOVASCULAR - ADULT  Goal: Maintains optimal cardiac output and hemodynamic stability  Description  INTERVENTIONS:  - Monitor I/O, vital signs and rhythm  - Monitor for S/S and trends of decreased cardiac output i e  bleeding, hypotension  - Administer and titrate ordered vasoactive medications to optimize hemodynamic stability  - Assess quality of pulses, skin color and temperature  - Assess for signs of decreased coronary artery perfusion - ex   Angina  - Instruct patient to report change in severity of symptoms  Outcome: Adequate for Discharge  Goal: Absence of cardiac dysrhythmias or at baseline rhythm  Description  INTERVENTIONS:  - Continuous cardiac monitoring, monitor vital signs, obtain 12 lead EKG if indicated  - Administer antiarrhythmic and heart rate control medications as ordered  - Monitor electrolytes and administer replacement therapy as ordered  Outcome: Adequate for Discharge     Problem: METABOLIC, FLUID AND ELECTROLYTES - ADULT  Goal: Electrolytes maintained within normal limits  Description  INTERVENTIONS:  - Monitor labs and assess patient for signs and symptoms of electrolyte imbalances  - Administer electrolyte replacement as ordered  - Monitor response to electrolyte replacements, including repeat lab results as appropriate  - Instruct patient on fluid and nutrition as appropriate  Outcome: Adequate for Discharge  Goal: Fluid balance maintained  Description  INTERVENTIONS:  - Monitor labs   - Instruct patient on fluid and nutrition as appropriate   Outcome: Adequate for Discharge     Problem: DISCHARGE PLANNING - CARE MANAGEMENT  Goal: Discharge to post-acute care or home with appropriate resources  Description  INTERVENTIONS:  - Conduct assessment to determine patient/family and health care team treatment goals, and need for post-acute services based on payer coverage, community resources, and patient preferences, and barriers to discharge  - Address psychosocial, clinical, and financial barriers to discharge as identified in assessment in conjunction with the patient/family and health care team  - Arrange appropriate level of post-acute services according to patient's   needs and preference and payer coverage in collaboration with the physician and health care team  - Communicate with and update the patient/family, physician, and health care team regarding progress on the discharge plan  - Arrange appropriate transportation to post-acute venues  -Outpatient follow up after dc   Outcome: Adequate for Discharge

## 2019-08-08 NOTE — PROGRESS NOTES
Progress Note - Nephrology   Princess Shine 27 y o  female MRN: [id number]  Unit/Bed#: 827-95 Encounter: [id number]    A/P:  1  Hypokalemia: potassium is 2 6 in spite of IV K-riders and oral potassium solution  Will receive KCl 80 meq IV today and potassium check at 6 pm   8/8: Potassium marquise to 4 yesterday but dropped to 3 this morning  She will receive IV supplement today and recheck at 2 pm  She feels symptomatically improved  2  Acute kidney injury: resolved  3  Hypophosphatemia: resolved      Follow up reason for today's visit: hypokalemia    Hypokalemia    Patient Active Problem List   Diagnosis    Hypokalemia    History of gastric bypass    Migraine without aura and without status migrainosus, not intractable    Left-sided weakness    Hypophosphatemia    Anemia    Vitamin D deficiency    Weakness of both lower extremities    Weakness of both upper extremities    Elevated CPK    Vitamin B12 deficiency    Cervical lymphadenopathy    Fatigue    Paresthesia of both lower extremities    Paresthesias    B12 deficiency    Gastric bypass status for obesity    GERD (gastroesophageal reflux disease)    Migraine    WAGNER (obstructive sleep apnea)    Other intestinal malabsorption    Acute kidney injury ([address] )    Right ovarian cyst    Chest pain    Intractable nausea and vomiting    Vertigo    Stress fracture of right foot with routine healing         Subjective:   10 point ROS -neg  8/8: no palpitations or muscle weakness    Objective:     Vitals: Blood pressure 106/66, pulse 78, temperature 98 °F (36 7 °C), temperature source Oral, resp  rate 18, height 5' 2" (1 575 m), weight 69 8 kg (153 lb 14 1 oz), SpO2 100 %  ,Body mass index is 28 15 kg/m²      Weight (last 2 days)     Date/Time   Weight    08/08/19 0600   69 8 (153 88)    08/07/19 0600   69 5 (153 2)    08/06/19 0600   68 7 (151 46)                Intake/Output Summary (Last 24 hours) at 8/8/2019 1034  Last data filed at 8/8/2019 1020  Gross per 24 hour   Intake 1480 ml   Output    Net 1480 ml     I/O last 3 completed shifts: In: 1200 [P O :1200]  Out: -          Physical Exam: /66 (BP Location: Left arm)   Pulse 78   Temp 98 °F (36 7 °C) (Oral)   Resp 18   Ht 5' 2" (1 575 m)   Wt 69 8 kg (153 lb 14 1 oz)   SpO2 100%   BMI 28 15 kg/m²     General Appearance:    Alert, cooperative, no distress, appears stated age   Head:    Normocephalic, without obvious abnormality, atraumatic   Eyes:    Conjunctiva/corneas clear   Ears:    Normal external ears   Nose:   Nares normal, septum midline, mucosa normal, no drainage    or sinus tenderness   Throat:   Lips, mucosa, and tongue normal; teeth and gums normal   Neck:   Supple, symmetrical, trachea midline, no adenopathy;        thyroid:  No enlargement/tenderness/nodules; no carotid    bruit or JVD   Back:     Symmetric, no curvature, ROM normal, no CVA tenderness   Lungs:     Clear to auscultation bilaterally, respirations unlabored   Chest wall:    No tenderness or deformity   Heart:    Regular rate and rhythm, S1 and S2 normal, no murmur, rub   or gallop   Abdomen:     Soft, non-tender, bowel sounds active   Extremities:   Extremities normal, atraumatic, no cyanosis or edema   Skin:   Skin color, texture, turgor normal, no rashes or lesions   Lymph nodes:   Cervical normal   Neurologic:   CNII-XII intact            Lab, Imaging and other studies: I have personally reviewed pertinent labs  CBC: No results found for: WBC, HGB, HCT, MCV, PLT, ADJUSTEDWBC, MCH, MCHC, RDW, MPV, NRBC  CMP:   Lab Results   Component Value Date    K 3 0 (L) 08/08/2019     (H) 08/08/2019    CO2 21 08/08/2019    BUN 12 08/08/2019    CREATININE 0 84 08/08/2019    CALCIUM 8 0 (L) 08/08/2019    EGFR 90 08/08/2019           Results from last 7 days   Lab Units 08/08/19  0608 08/07/19  1822 08/07/19  0531  08/05/19  1753   POTASSIUM mmol/L 3 0* 4 0 2 6*   < > 1 9*   CHLORIDE mmol/L 109* 107 107   < > 98*   CO2 mmol/L 21 20* 23   < > 27   BUN mg/dL 12 14 12   < > 23   CREATININE mg/dL 0 84 0 88 0 92   < > 1 33*   CALCIUM mg/dL 8 0* 7 6* 7 9*   < > 8 4   ALK PHOS U/L  --   --   --   --  55   ALT U/L  --   --   --   --  29   AST U/L  --   --   --   --  31    < > = values in this interval not displayed  Phosphorus:   No results found for: PHOS  Magnesium:   Lab Results   Component Value Date    MG 1 9 08/08/2019     Urinalysis: No results found for: [place], SPECGRAV, PHUR, LEUKOCYTESUR, NITRITE, PROTEINUA, GLUCOSEU, KETONESU, BILIRUBINUR, BLOODU  Ionized Calcium: No results found for: CAION  Coagulation: No results found for: PT, INR, APTT  Troponin: No results found for: TROPONINI  ABG: No results found for: PHART, WDV6EOC, PO2ART, JFT2RAP, Y6KUTDQP, BEART, SOURCE  Radiology review:     IMAGING  Procedure: Xr Chest 2 Views    Result Date: 8/5/2019  Narrative: CHEST INDICATION:   cp  COMPARISON:  1/31/2019 EXAM PERFORMED/VIEWS:  XR CHEST PA & LATERAL FINDINGS:  Right chest port unchanged  Cardiomediastinal silhouette appears stable  The lungs are clear  No pneumothorax or pleural effusion  Osseous structures appear within normal limits for patient age  Impression: No acute cardiopulmonary disease   Workstation performed: [id number]       Current Facility-Administered Medications   Medication Dose Route Frequency    AMILoride tablet 5 mg  5 mg Oral Daily    amitriptyline (ELAVIL) tablet 50 mg  50 mg Oral HS    ferrous sulfate tablet 325 mg  325 mg Oral Every Other Day    meclizine (ANTIVERT) tablet 25 mg  25 mg Oral HS    potassium chloride 10 % oral solution 60 mEq  60 mEq Oral 5x Daily    potassium chloride 20 mEq IVPB (premix)  20 mEq Intravenous Q2H    thiamine (VITAMIN B1) tablet 100 mg  100 mg Oral Daily    zinc sulfate (ZINCATE) capsule 220 mg  220 mg Oral Daily     Medications Discontinued During This Encounter   Medication Reason    zinc gluconate tablet 50 mg Availability    potassium chloride 20 mEq IVPB (premix)     potassium chloride 20 mEq IVPB (premix)     sodium chloride 0 9 % infusion     potassium chloride 20 mEq IVPB (premix)        Edgar Spears MD

## 2019-08-09 NOTE — PROGRESS NOTES
1540 Left message on Dr Florencia Salazar cell phone to call infusion center to discuss most recent hospitalization and plan of care   154 Left message on Brittani's cell phone to call infusion center regarding treatment next week

## 2019-08-09 NOTE — PROGRESS NOTES
[facility]  Hematology/Oncology  H&P    Patient Name: Sharri Cline  MRN: [id number]  Admission Date: 8/8/2019  Code Status: Full Code   Attending Provider: Dominique Ayala MD  Primary Care Physician: Jhon Arndt MD  Principal Problem:Neutropenic fever    Subjective:     HPI: Ms. Sharri Cline is a 66 year old female with stage 1B invasive ductal carcinoma of the R breast (ER neg, PA neg, HER2 neg) that presents with cough, sore throat, and fever (Tmax 101). She started neoadjuvant chemotherapy with dose-dense adriamycin and cyclophosphamide followed by paclitaxel. She completed cycle 3 on 7/31/19. She first noticed a productive cough within a couple of days after the infusion. She has had this cough for about a week. The past 2 days, she has experienced fevers (Tmax 101 at home), chills, fatigue, and sore throat. Today, she has felt a little SOB. She has not taken anything to alleviate the cough or fevers.     In the ED, she was febrile (Tmax 102.3), slightly tachycardic, tachypnic, but blood pressure was stable. Sepsis order set was initiated and was given IVFs. UA, UCx, and BCx were ordered. On admission, she was found to be pancytopenic ( with ANC 40, Hb 7.8, PLT 94). Lactate was normal. CXR was negative for any acute infectious causes. EKG negative for acute ischemic changes.       Oncology History:  Oncologist = Dr. Ayala  Presented for screening mammo in 5/15/2019 which showed focal asymmetries in both left and right breast  - Diagnostic mammogram and US showed no significant abn of left breast, and right breast 15 mm x 11 mm x 12 mm mass at 4:00, 5 CFN.   - Biopsy on 5/24/19 showed grade 3 IDC, triple negative, Ki67 70%.   7/3/19 started neoadjuvant chemotherapy with ddAC to be followed by Taxol.     Oncology Treatment Plan:   OP BREAST DOSE-DENSE AC - DOXORUBICIN CYCLOPHOSPHAMIDE Q2W    Medications:  Continuous Infusions:  Scheduled Meds:   enoxaparin  40 mg Subcutaneous Daily     Progress Note - Nephrology   Gilda Rico 27 y o  female MRN: [id number]  Unit/Bed#: 403-01 Encounter: [id number]    A/P:  1  Hypokalemia               continue with 60 mEq oral liquid potassium chloride 5 times a day will add an additional 80 mEq IV today and then hopefully the patient continues to tolerate oral intake no nausea vomiting diarrhea she can be discharged  No additional workup from renal standpoint this time  2  History of hypophosphatemia               phosphorus level is low normal  3  Gastric bypass surgery  4  Nausea-vomiting-diarrhea               significantly improved, after further discussion with the patient it appears that she had a gastric side effect from the use of clindamycin  I advised her that in the future although she does not have a clindamycin allergy she does have a sensitivity from the oral uses this and should decline oral medication in the future  5  Lateral foot abnormality               resolved   6   Migraine      Follow up reason for today's visit:  Hypokalemia    Hypokalemia    Patient Active Problem List   Diagnosis    Hypokalemia    History of gastric bypass    Migraine without aura and without status migrainosus, not intractable    Left-sided weakness    Hypophosphatemia    Anemia    Vitamin D deficiency    Weakness of both lower extremities    Weakness of both upper extremities    Elevated CPK    Vitamin B12 deficiency    Cervical lymphadenopathy    Generalized weakness    Paresthesia of both lower extremities    Paresthesias    B12 deficiency    Gastric bypass status for obesity    GERD (gastroesophageal reflux disease)    Migraine    WAGNER (obstructive sleep apnea)    Other intestinal malabsorption    Acute kidney injury ([address] )    Right ovarian cyst    Chest pain    Intractable nausea and vomiting    Vertigo    Stress fracture of right foot with routine healing         Subjective:   No acute events overnight, no nausea vomiting diarrhea  Objective:     Vitals: Blood pressure 104/52, pulse 76, temperature 97 8 °F (36 6 °C), temperature source Temporal, resp  rate 18, height 5' 2" (1 575 m), weight 71 8 kg (158 lb 4 6 oz), SpO2 99 %  ,Body mass index is 28 95 kg/m²  Weight (last 2 days)     Date/Time   Weight    07/16/19 0600   71 8 (158 29)    07/15/19 0535   70 8 (156 09)    07/14/19 1743   70 1 (154 54)    07/14/19 1431   71 3 (157 19)                Intake/Output Summary (Last 24 hours) at 7/16/2019 0812  Last data filed at 7/15/2019 2152  Gross per 24 hour   Intake 906 67 ml   Output    Net 906 67 ml     I/O last 3 completed shifts: In: 1886 7 [P O :580; I V :1306 7]  Out: -          Physical Exam: /52 (BP Location: Right arm)   Pulse 76   Temp 97 8 °F (36 6 °C) (Temporal)   Resp 18   Ht 5' 2" (1 575 m)   Wt 71 8 kg (158 lb 4 6 oz)   SpO2 99%   BMI 28 95 kg/m²     General Appearance:    Alert, cooperative, no distress, appears stated age   Head:    Normocephalic, without obvious abnormality, atraumatic   Eyes:    Conjunctiva/corneas clear   Ears:    Normal external ears   Nose:   Nares normal, septum midline, mucosa normal, no drainage    or sinus tenderness   Throat:   Lips, mucosa, and tongue normal; teeth and gums normal   Neck:   Supple   Back:     Symmetric, no curvature, ROM normal, no CVA tenderness   Lungs:     Clear to auscultation bilaterally, respirations unlabored   Chest wall:    No tenderness or deformity   Heart:    Regular rate and rhythm, S1 and S2 normal, no murmur, rub   or gallop   Abdomen:     Soft, non-tender, bowel sounds active   Extremities:   Extremities normal, atraumatic, no cyanosis or edema   Skin:   Skin color, texture, turgor normal, no rashes or lesions   Lymph nodes:   Cervical normal   Neurologic:   CNII-XII intact            Lab, Imaging and other studies: I have personally reviewed pertinent labs    CBC:   Lab Results   Component Value Date    WBC 4 85 07/16/2019    HGB 10 8 (L) senna-docusate 8.6-50 mg  2 tablet Oral BID     PRN Meds:acetaminophen, Dextrose 10% Bolus, Dextrose 10% Bolus, glucagon (human recombinant), glucose, glucose, ondansetron, prochlorperazine, sodium chloride 0.9%     Review of patient's allergies indicates:  No Known Allergies     Past Medical History:   Diagnosis Date    Helicobacter pylori antibody positive 5/27/13    treated with clarithromycin, metronidazole, and omeprazole x 14 days  (5/27-6/3); EGD normal in July 2013     Past Surgical History:   Procedure Laterality Date    BIOPSY, LIVER, WITH US GUIDANCE N/A 11/19/2018    Performed by [facility] Provider at [facility] OR 2ND FLR    COLONOSCOPY N/A 7/9/2013    Performed by Ha Harrington MD at [facility] ENDO (4TH FLR)    EGD (ESOPHAGOGASTRODUODENOSCOPY) N/A 7/9/2013    Performed by Ha Harrington MD at [facility] ENDO (4TH FLR)    EXCISION-LYMPH NODES Right 1/8/2016    Performed by Sanya Farah MD at [facility] OR 2ND FLR    GWXNMXAFV-RDDD-R-CATH - CHEST Left 6/13/2019    Performed by Libertad Hernandez MD at [facility] OR 2ND FLR    PAROTIDECTOMY Right 1/8/2016    Performed by Sanya Farah MD at [facility] OR 2ND FLR    TUBAL LIGATION       Family History     Problem Relation (Age of Onset)    Colon cancer Maternal Aunt    Diabetes Father, Brother, Brother, Brother    Eczema Mother    ANGEL disease Mother    Hypertension Mother    Miscarriages / Stillbirths Mother    No Known Problems Sister, Maternal Uncle, Paternal Aunt, Paternal Uncle, Maternal Grandmother, Maternal Grandfather, Paternal Grandmother, Paternal Grandfather, Son, Son    Parkinsonism Mother        Tobacco Use    Smoking status: Never Smoker    Smokeless tobacco: Never Used   Substance and Sexual Activity    Alcohol use: Yes     Comment: Social    Drug use: No    Sexual activity: Yes     Partners: Male     Birth control/protection: Post-menopausal       Review of Systems   Constitutional: Positive for activity change (decreased),  07/16/2019    HCT 33 9 (L) 07/16/2019    MCV 98 07/16/2019     07/16/2019    MCH 31 3 07/16/2019    MCHC 31 9 07/16/2019    RDW 14 2 07/16/2019    MPV 10 7 07/16/2019    NRBC 0 07/16/2019     CMP:   Lab Results   Component Value Date    K 3 5 07/16/2019     (H) 07/16/2019    CO2 24 07/16/2019    BUN 13 07/16/2019    CREATININE 0 87 07/16/2019    CALCIUM 7 6 (L) 07/16/2019    EGFR 87 07/16/2019         Results from last 7 days   Lab Units 07/16/19  0639 07/15/19  0456 07/14/19  1534   POTASSIUM mmol/L 3 5 2 6* 2 2*   CHLORIDE mmol/L 111* 111* 105   CO2 mmol/L 24 22 24   BUN mg/dL 13 12 10   CREATININE mg/dL 0 87 0 86 0 95   CALCIUM mg/dL 7 6* 7 8* 8 1*   ALK PHOS U/L  --   --  47   ALT U/L  --   --  26   AST U/L  --   --  19         Phosphorus: No results found for: PHOS  Magnesium: No results found for: MG  Urinalysis: No results found for: COLORU, CLARITYU, SPECGRAV, PHUR, LEUKOCYTESUR, NITRITE, PROTEINUA, GLUCOSEU, KETONESU, BILIRUBINUR, BLOODU  Ionized Calcium: No results found for: CAION  Coagulation: No results found for: PT, INR, APTT  Troponin: No results found for: TROPONINI  ABG: No results found for: PHART, RRT4EEA, PO2ART, KQU6HBE, X7MYLJHX, BEART, SOURCE  Radiology review:     IMAGING  No results found      Current Facility-Administered Medications   Medication Dose Route Frequency    acetaminophen (TYLENOL) tablet 650 mg  650 mg Oral Q6H PRN    AMILoride tablet 5 mg  5 mg Oral Daily    amitriptyline (ELAVIL) tablet 50 mg  50 mg Oral HS    ferrous sulfate tablet 325 mg  325 mg Oral Every Other Day    meclizine (ANTIVERT) tablet 25 mg  25 mg Oral HS    metoclopramide (REGLAN) injection 10 mg  10 mg Intravenous Q6H PRN    multivitamin-minerals (CENTRUM) tablet 1 tablet  1 tablet Oral Daily    ondansetron (ZOFRAN) injection 4 mg  4 mg Intravenous Q6H PRN    potassium chloride 10 % oral solution 60 mEq  60 mEq Oral 5x Daily    potassium chloride 20 mEq IVPB (premix)  20 mEq Intravenous appetite change (decreased), chills and fever (Tmax 102.3).   HENT: Positive for sore throat, trouble swallowing and voice change. Negative for congestion and ear pain.    Eyes: Negative for visual disturbance.   Respiratory: Positive for cough (productive) and shortness of breath (mild). Negative for wheezing.    Cardiovascular: Negative for chest pain, palpitations and leg swelling.   Gastrointestinal: Positive for constipation (chronic, last BM was 5 days ago). Negative for abdominal pain, blood in stool, diarrhea, nausea and vomiting.   Genitourinary: Positive for decreased urine volume. Negative for dysuria and hematuria.   Musculoskeletal: Negative for arthralgias and myalgias.   Skin: Negative for rash.   Allergic/Immunologic: Positive for immunocompromised state.   Neurological: Negative for dizziness, light-headedness, numbness and headaches.   Psychiatric/Behavioral: Negative for agitation and confusion.     Objective:     Vital Signs (Most Recent):  Temp: 99.1 °F (37.3 °C) (08/08/19 1359)  Pulse: 94 (08/08/19 1429)  Resp: (!) 30 (08/08/19 1044)  BP: (!) 122/59 (08/08/19 1429)  SpO2: 100 % (08/08/19 1429) Vital Signs (24h Range):  Temp:  [99.1 °F (37.3 °C)-102.3 °F (39.1 °C)] 99.1 °F (37.3 °C)  Pulse:  [] 94  Resp:  [30] 30  SpO2:  [99 %-100 %] 100 %  BP: (118-137)/(56-63) 122/59     Weight: 63.5 kg (140 lb)  Body mass index is 24.8 kg/m².  Body surface area is 1.68 meters squared.      Intake/Output Summary (Last 24 hours) at 8/8/2019 1713  Last data filed at 8/8/2019 1543  Gross per 24 hour   Intake 2505 ml   Output --   Net 2505 ml       Physical Exam   Constitutional: She appears well-developed and well-nourished. She appears distressed.   Ill-appearing  L chest port site without erythema or tenderness to palpation   HENT:   Head: Normocephalic and atraumatic.   Mouth/Throat: No oropharyngeal exudate.   Mild pharyngeal erythema   Eyes: Conjunctivae are normal.   Neck: Normal range of motion. Neck  Q2H     Medications Discontinued During This Encounter   Medication Reason    metoclopramide (REGLAN) injection 10 mg     potassium chloride 10 % oral solution 60 mEq     sodium chloride 0 9 % with KCl 40 mEq/L infusion (premix)     multivitamin stress formula tablet 1 tablet        Erlinda Guerra DO supple.   Cardiovascular: Normal rate and regular rhythm.   Murmur (systolic murmur at LSB in 2nd IC space) heard.  Pulmonary/Chest: Effort normal and breath sounds normal. No respiratory distress. She has no wheezes. She has no rales.   RR 30   Abdominal: Soft. Bowel sounds are normal. She exhibits no distension. There is no tenderness. There is no guarding.   Musculoskeletal: She exhibits no edema or tenderness.   Neurological: She is alert. She exhibits normal muscle tone.   Skin: Skin is warm and dry. No rash noted.   Psychiatric: She has a normal mood and affect. Her behavior is normal.       Significant Labs:   CBC:   Recent Labs   Lab 08/08/19  1147   WBC 0.22*   HGB 7.8*   HCT 24.9*   PLT 94*    and CMP:   Recent Labs   Lab 08/08/19  1147   *   K 3.6      CO2 20*   *   BUN 16   CREATININE 0.7   CALCIUM 9.9   PROT 7.2   ALBUMIN 3.0*   BILITOT 1.3*   ALKPHOS 112   AST 25   ALT 17   ANIONGAP 13   EGFRNONAA >60.0       Diagnostic Results:    XR Chest  Left-sided chest port is in place with the catheter tip in the SVC.  Heart size and pulmonary vascularity are within normal limits.  Mild tortuosity of the thoracic aorta with atherosclerotic calcification in the wall of the aortic arch.  Lungs are satisfactorily expanded and appear free of active disease.  No pleural fluid or pneumothorax.  Soft tissues and osseous structures are unremarkable  Impression: No active cardiopulmonary disease        Assessment/Plan:     * Neutropenic fever  Patient with productive cough, fever/chills, and sore throat in setting of recent chemo (7/31)  On admission, Tmax 102.3 and ANC <100  CXR negative for acute infectious process  UA negative, UCx and BCx pending  Influenza negative, rapid strep negative  Received Neulasta with last chemo, so doesn't qualify for Granix    Plan:  - Started on vanc and zosyn  - Tylenol for fevers  - F/u on UCx and BCx    Malignant neoplasm of lower-inner quadrant of right breast of  female, estrogen receptor negative  Stage 1B ([id number]) invasive ductal carcinoma of right breast, lower outer quadrant, ER neg, PA neg, Her2 neg  Started neoadjuvant chemotherapy with dose-dense adriamycin and cyclophosphamide followed by paclitaxel   Completed cycle 3/4 on 7/31/19  - Continue care with Dr. Ayala as outpatient     Pancytopenia due to antineoplastic chemotherapy  On admission,  with ANC 40, Hb 7.8, PLT 94  - CBCs daily    Hyponatremia  Na 134 on admission  - CMPs daily        Chico Sarmiento MD  Hematology/Oncology  [facility]

## 2019-08-09 NOTE — UTILIZATION REVIEW
Notification of Discharge  This is a Notification of Discharge from our facility [address]  Please be advised that this patient has been discharge from our facility  Below you will find the admission and discharge date and time including the patients disposition  PRESENTATION DATE: 8/5/2019  4:40 PM  OBS ADMISSION DATE:   IP ADMISSION DATE: 8/6/19 1128   DISCHARGE DATE: 8/8/2019  3:35 PM  DISPOSITION: Home/Self Care Home/Self Care   Admission Orders listed below:  Admission Orders (From admission, onward)     Ordered        08/06/19 1127  Inpatient Admission  Once         08/05/19 2003  Place in Observation  Once                   Please contact the UR Department if additional information is required to close this patient's authorization/case  [address]  Utilization Review Department  Phone: [phone]; Fax [phone]  [email or web address]  ATTENTION: Please call with any questions or concerns to [phone]  and carefully listen to the prompts so that you are directed to the right person  Send all requests for admission clinical reviews, approved or denied determinations and any other requests to fax [phone]   All voicemails are confidential

## 2019-08-13 RX ORDER — SODIUM CHLORIDE 9 MG/ML
20 INJECTION, SOLUTION INTRAVENOUS CONTINUOUS
Status: CANCELLED
Start: 2019-08-15

## 2019-08-15 ENCOUNTER — HOSPITAL ENCOUNTER (OUTPATIENT)
Dept: INFUSION CENTER | Facility: HOSPITAL | Age: 34
Discharge: HOME | End: 2019-08-15

## 2019-08-20 ENCOUNTER — HOSPITAL ENCOUNTER (OUTPATIENT)
Dept: INFUSION CENTER | Facility: HOSPITAL | Age: 34
Discharge: HOME | End: 2019-08-20

## 2019-08-20 DIAGNOSIS — E87.6 HYPOKALEMIA: ICD-10-CM

## 2019-08-21 NOTE — PLAN OF CARE
5 Problem: PAIN - ADULT  Goal: Verbalizes/displays adequate comfort level or baseline comfort level  Description  Interventions:  - Encourage patient to monitor pain and request assistance  - Assess pain using appropriate pain scale  - Administer analgesics based on type and severity of pain and evaluate response  - Implement non-pharmacological measures as appropriate and evaluate response  - Consider cultural and social influences on pain and pain management  - Notify physician/advanced practitioner if interventions unsuccessful or patient reports new pain  Outcome: Progressing     Problem: INFECTION - ADULT  Goal: Absence or prevention of progression during hospitalization  Description  INTERVENTIONS:  - Assess and monitor for signs and symptoms of infection  - Monitor lab/diagnostic results  - Monitor all insertion sites, i e  indwelling lines, tubes, and drains  - Monitor endotracheal (as able) and nasal secretions for changes in amount and color  - [place] appropriate cooling/warming therapies per order  - Administer medications as ordered  - Instruct and encourage patient and family to use good hand hygiene technique  - Identify and instruct in appropriate isolation precautions for identified infection/condition  Outcome: Progressing  Goal: Absence of fever/infection during neutropenic period  Description  INTERVENTIONS:  - Monitor WBC  - Implement neutropenic guidelines  Outcome: Progressing     Problem: SAFETY ADULT  Goal: Patient will remain free of falls  Description  INTERVENTIONS:  - Assess patient frequently for physical needs  -  Identify cognitive and physical deficits and behaviors that affect risk of falls    -  [place] fall precautions as indicated by assessment   - Educate patient/family on patient safety including physical limitations  - Instruct patient to call for assistance with activity based on assessment  - Modify environment to reduce risk of injury  - Consider OT/PT consult to assist with strengthening/mobility  Outcome: Progressing  Goal: Maintain or return to baseline ADL function  Description  INTERVENTIONS:  -  Assess patient's ability to carry out ADLs; assess patient's baseline for ADL function and identify physical deficits which impact ability to perform ADLs (bathing, care of mouth/teeth, toileting, grooming, dressing, etc )  - Assess/evaluate cause of self-care deficits   - Assess range of motion  - Assess patient's mobility; develop plan if impaired  - Assess patient's need for assistive devices and provide as appropriate  - Encourage maximum independence but intervene and supervise when necessary  ¯ Involve family in performance of ADLs  ¯ Assess for home care needs following discharge   ¯ Request OT consult to assist with ADL evaluation and planning for discharge  ¯ Provide patient education as appropriate  Outcome: Progressing  Goal: Maintain or return mobility status to optimal level  Description  INTERVENTIONS:  - Assess patient's baseline mobility status (ambulation, transfers, stairs, etc )    - Identify cognitive and physical deficits and behaviors that affect mobility  - Identify mobility aids required to assist with transfers and/or ambulation (gait belt, sit-to-stand, lift, walker, cane, etc )  - [place] fall precautions as indicated by assessment  - Record patient progress and toleration of activity level on Mobility SBAR; progress patient to next Phase/Stage  - Instruct patient to call for assistance with activity based on assessment  - Request Rehabilitation consult to assist with strengthening/weightbearing, etc   Outcome: Progressing     Problem: Knowledge Deficit  Goal: Patient/family/caregiver demonstrates understanding of disease process, treatment plan, medications, and discharge instructions  Description  Complete learning assessment and assess knowledge base    Interventions:  - Provide teaching at level of understanding  - Provide teaching via preferred learning methods  Outcome: Progressing

## 2019-08-30 RX ORDER — SODIUM CHLORIDE 9 MG/ML
20 INJECTION, SOLUTION INTRAVENOUS CONTINUOUS
Status: CANCELLED
Start: 2019-09-02

## 2019-09-03 ENCOUNTER — HOSPITAL ENCOUNTER (OUTPATIENT)
Dept: INFUSION CENTER | Facility: HOSPITAL | Age: 34
End: 2019-09-03

## 2019-09-05 ENCOUNTER — HOSPITAL ENCOUNTER (OUTPATIENT)
Dept: INFUSION CENTER | Facility: HOSPITAL | Age: 34
Discharge: HOME | End: 2019-09-05
Attending: INTERNAL MEDICINE
Payer: COMMERCIAL

## 2019-09-05 VITALS
DIASTOLIC BLOOD PRESSURE: 56 MMHG | OXYGEN SATURATION: 100 % | SYSTOLIC BLOOD PRESSURE: 114 MMHG | TEMPERATURE: 97 F | RESPIRATION RATE: 16 BRPM

## 2019-09-05 DIAGNOSIS — E87.6 HYPOKALEMIA: Primary | ICD-10-CM

## 2019-09-05 LAB
ANION GAP SERPL CALCULATED.3IONS-SCNC: 8 MMOL/L (ref 4–13)
BUN SERPL-MCNC: 11 MG/DL (ref 5–25)
CALCIUM SERPL-MCNC: 8.3 MG/DL (ref 8.3–10.1)
CHLORIDE SERPL-SCNC: 106 MMOL/L (ref 100–108)
CO2 SERPL-SCNC: 23 MMOL/L (ref 21–32)
CREAT SERPL-MCNC: 0.83 MG/DL (ref 0.6–1.3)
GFR SERPL CREATININE-BSD FRML MDRD: 92 ML/MIN/1.73SQ M
GLUCOSE SERPL-MCNC: 77 MG/DL (ref 65–140)
POTASSIUM SERPL-SCNC: 3.4 MMOL/L (ref 3.5–5.3)
SODIUM SERPL-SCNC: 137 MMOL/L (ref 136–145)

## 2019-09-05 PROCEDURE — 96366 THER/PROPH/DIAG IV INF ADDON: CPT

## 2019-09-05 PROCEDURE — 80048 BASIC METABOLIC PNL TOTAL CA: CPT | Performed by: INTERNAL MEDICINE

## 2019-09-05 PROCEDURE — 96365 THER/PROPH/DIAG IV INF INIT: CPT

## 2019-09-05 RX ORDER — SODIUM CHLORIDE 9 MG/ML
20 INJECTION, SOLUTION INTRAVENOUS CONTINUOUS
Status: CANCELLED
Start: 2019-09-09

## 2019-09-05 RX ORDER — SODIUM CHLORIDE 9 MG/ML
20 INJECTION, SOLUTION INTRAVENOUS CONTINUOUS
Status: DISCONTINUED | OUTPATIENT
Start: 2019-09-05 | End: 2019-09-08 | Stop reason: HOSPADM

## 2019-09-05 RX ADMIN — SODIUM CHLORIDE 20 ML/HR: 0.9 INJECTION, SOLUTION INTRAVENOUS at 09:47

## 2019-09-05 RX ADMIN — POTASSIUM CHLORIDE: 149 INJECTION, SOLUTION, CONCENTRATE INTRAVENOUS at 10:38

## 2019-09-05 NOTE — PROGRESS NOTES
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

## 2019-09-05 NOTE — PLAN OF CARE
Problem: Potential for Falls  Goal: Patient will remain free of falls  Description  INTERVENTIONS:  - Assess patient frequently for physical needs  -  Identify cognitive and physical deficits and behaviors that affect risk of falls    -  [place] fall precautions as indicated by assessment   - Educate patient/family on patient safety including physical limitations  - Instruct patient to call for assistance with activity based on assessment  - Modify environment to reduce risk of injury  - Consider OT/PT consult to assist with strengthening/mobility  Outcome: Progressing     Problem: INFECTION - ADULT  Goal: Absence or prevention of progression during hospitalization  Description  INTERVENTIONS:  - Assess and monitor for signs and symptoms of infection  - Monitor lab/diagnostic results  - Monitor all insertion sites, i e  indwelling lines, tubes, and drains  - Administer medications as ordered   Outcome: Progressing

## 2019-09-05 NOTE — PROGRESS NOTES
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

## 2019-09-10 ENCOUNTER — HOSPITAL ENCOUNTER (OUTPATIENT)
Dept: INFUSION CENTER | Facility: HOSPITAL | Age: 34
Discharge: HOME | End: 2019-09-10
Payer: COMMERCIAL

## 2019-09-10 VITALS
RESPIRATION RATE: 16 BRPM | OXYGEN SATURATION: 100 % | DIASTOLIC BLOOD PRESSURE: 50 MMHG | SYSTOLIC BLOOD PRESSURE: 106 MMHG | TEMPERATURE: 97.4 F | HEART RATE: 80 BPM

## 2019-09-10 DIAGNOSIS — E87.6 HYPOKALEMIA: Primary | ICD-10-CM

## 2019-09-10 LAB
ANION GAP SERPL CALCULATED.3IONS-SCNC: 10 MMOL/L (ref 4–13)
BUN SERPL-MCNC: 11 MG/DL (ref 5–25)
CALCIUM SERPL-MCNC: 8.4 MG/DL (ref 8.3–10.1)
CHLORIDE SERPL-SCNC: 107 MMOL/L (ref 100–108)
CO2 SERPL-SCNC: 22 MMOL/L (ref 21–32)
CREAT SERPL-MCNC: 1.05 MG/DL (ref 0.6–1.3)
GFR SERPL CREATININE-BSD FRML MDRD: 69 ML/MIN/1.73SQ M
GLUCOSE SERPL-MCNC: 75 MG/DL (ref 65–140)
POTASSIUM SERPL-SCNC: 4.4 MMOL/L (ref 3.5–5.3)
SODIUM SERPL-SCNC: 139 MMOL/L (ref 136–145)

## 2019-09-10 PROCEDURE — 80048 BASIC METABOLIC PNL TOTAL CA: CPT | Performed by: INTERNAL MEDICINE

## 2019-09-10 PROCEDURE — 36593 DECLOT VASCULAR DEVICE: CPT

## 2019-09-10 PROCEDURE — 96366 THER/PROPH/DIAG IV INF ADDON: CPT

## 2019-09-10 PROCEDURE — 96365 THER/PROPH/DIAG IV INF INIT: CPT

## 2019-09-10 RX ORDER — SODIUM CHLORIDE 9 MG/ML
20 INJECTION, SOLUTION INTRAVENOUS CONTINUOUS
Status: CANCELLED
Start: 2019-09-12

## 2019-09-10 RX ORDER — SODIUM CHLORIDE 9 MG/ML
20 INJECTION, SOLUTION INTRAVENOUS CONTINUOUS
Status: DISCONTINUED | OUTPATIENT
Start: 2019-09-10 | End: 2019-09-13 | Stop reason: HOSPADM

## 2019-09-10 RX ADMIN — POTASSIUM CHLORIDE: 149 INJECTION, SOLUTION, CONCENTRATE INTRAVENOUS at 10:09

## 2019-09-10 RX ADMIN — WATER 2.2 ML: 1 INJECTION INTRAMUSCULAR; INTRAVENOUS; SUBCUTANEOUS at 09:42

## 2019-09-10 RX ADMIN — SODIUM CHLORIDE 20 ML/HR: 0.9 INJECTION, SOLUTION INTRAVENOUS at 09:55

## 2019-09-10 RX ADMIN — ALTEPLASE 2 MG: 2.2 INJECTION, POWDER, LYOPHILIZED, FOR SOLUTION INTRAVENOUS at 09:42

## 2019-09-12 ENCOUNTER — HOSPITAL ENCOUNTER (OUTPATIENT)
Dept: INFUSION CENTER | Facility: HOSPITAL | Age: 34
Discharge: HOME | End: 2019-09-12
Attending: INTERNAL MEDICINE

## 2019-09-12 RX ORDER — SODIUM CHLORIDE 9 MG/ML
20 INJECTION, SOLUTION INTRAVENOUS CONTINUOUS
Status: CANCELLED
Start: 2019-09-16

## 2019-09-16 ENCOUNTER — HOSPITAL ENCOUNTER (OUTPATIENT)
Dept: INFUSION CENTER | Facility: HOSPITAL | Age: 34
Discharge: HOME | End: 2019-09-16
Attending: INTERNAL MEDICINE

## 2019-09-19 ENCOUNTER — TELEPHONE (OUTPATIENT)
Dept: NEPHROLOGY | Facility: CLINIC | Age: 34
End: 2019-09-19

## 2019-09-19 ENCOUNTER — HOSPITAL ENCOUNTER (OUTPATIENT)
Dept: INFUSION CENTER | Facility: HOSPITAL | Age: 34
Discharge: HOME | End: 2019-09-19
Attending: INTERNAL MEDICINE
Payer: COMMERCIAL

## 2019-09-19 VITALS
SYSTOLIC BLOOD PRESSURE: 105 MMHG | DIASTOLIC BLOOD PRESSURE: 56 MMHG | HEART RATE: 89 BPM | TEMPERATURE: 97.4 F | RESPIRATION RATE: 16 BRPM

## 2019-09-19 DIAGNOSIS — E87.6 HYPOKALEMIA: Primary | ICD-10-CM

## 2019-09-19 LAB
ANION GAP SERPL CALCULATED.3IONS-SCNC: 9 MMOL/L (ref 4–13)
BUN SERPL-MCNC: 11 MG/DL (ref 5–25)
CALCIUM SERPL-MCNC: 8 MG/DL (ref 8.3–10.1)
CHLORIDE SERPL-SCNC: 109 MMOL/L (ref 100–108)
CO2 SERPL-SCNC: 19 MMOL/L (ref 21–32)
CREAT SERPL-MCNC: 0.94 MG/DL (ref 0.6–1.3)
GFR SERPL CREATININE-BSD FRML MDRD: 79 ML/MIN/1.73SQ M
GLUCOSE SERPL-MCNC: 70 MG/DL (ref 65–140)
POTASSIUM SERPL-SCNC: 4.6 MMOL/L (ref 3.5–5.3)
SODIUM SERPL-SCNC: 137 MMOL/L (ref 136–145)

## 2019-09-19 PROCEDURE — 96366 THER/PROPH/DIAG IV INF ADDON: CPT

## 2019-09-19 PROCEDURE — 80048 BASIC METABOLIC PNL TOTAL CA: CPT | Performed by: INTERNAL MEDICINE

## 2019-09-19 PROCEDURE — 96365 THER/PROPH/DIAG IV INF INIT: CPT

## 2019-09-19 RX ORDER — SODIUM CHLORIDE 9 MG/ML
20 INJECTION, SOLUTION INTRAVENOUS CONTINUOUS
Status: DISCONTINUED | OUTPATIENT
Start: 2019-09-19 | End: 2019-09-22 | Stop reason: HOSPADM

## 2019-09-19 RX ORDER — SODIUM CHLORIDE 9 MG/ML
20 INJECTION, SOLUTION INTRAVENOUS CONTINUOUS
Status: CANCELLED
Start: 2019-09-23

## 2019-09-19 RX ADMIN — POTASSIUM CHLORIDE: 149 INJECTION, SOLUTION, CONCENTRATE INTRAVENOUS at 10:30

## 2019-09-19 RX ADMIN — SODIUM CHLORIDE 20 ML/HR: 0.9 INJECTION, SOLUTION INTRAVENOUS at 10:28

## 2019-09-19 NOTE — TELEPHONE ENCOUNTER
----- Message from Karolee Cheadle, DO sent at 9/19/2019 11:31 AM EDT -----  Potassium 4 6 noted, please as if she is having diarrhea as on for blood work notes the potential for this  If still recommend bulking up stools with either fiber or we can call in for Questran

## 2019-09-19 NOTE — PLAN OF CARE
Problem: INFECTION - ADULT  Goal: Absence of fever/infection during neutropenic period  Description  INTERVENTIONS:  - Monitor WBC    Outcome: Progressing

## 2019-09-23 ENCOUNTER — HOSPITAL ENCOUNTER (OUTPATIENT)
Dept: INFUSION CENTER | Facility: HOSPITAL | Age: 34
Discharge: HOME | End: 2019-09-23
Attending: INTERNAL MEDICINE
Payer: COMMERCIAL

## 2019-09-23 VITALS
RESPIRATION RATE: 18 BRPM | TEMPERATURE: 99.2 F | SYSTOLIC BLOOD PRESSURE: 113 MMHG | HEART RATE: 81 BPM | OXYGEN SATURATION: 100 % | DIASTOLIC BLOOD PRESSURE: 56 MMHG

## 2019-09-23 DIAGNOSIS — E87.6 HYPOKALEMIA: Primary | ICD-10-CM

## 2019-09-23 LAB
ANION GAP SERPL CALCULATED.3IONS-SCNC: 11 MMOL/L (ref 4–13)
BUN SERPL-MCNC: 11 MG/DL (ref 5–25)
CALCIUM SERPL-MCNC: 8 MG/DL (ref 8.3–10.1)
CHLORIDE SERPL-SCNC: 103 MMOL/L (ref 100–108)
CO2 SERPL-SCNC: 21 MMOL/L (ref 21–32)
CREAT SERPL-MCNC: 1.01 MG/DL (ref 0.6–1.3)
GFR SERPL CREATININE-BSD FRML MDRD: 72 ML/MIN/1.73SQ M
GLUCOSE SERPL-MCNC: 120 MG/DL (ref 65–140)
POTASSIUM SERPL-SCNC: 3.9 MMOL/L (ref 3.5–5.3)
SODIUM SERPL-SCNC: 135 MMOL/L (ref 136–145)

## 2019-09-23 PROCEDURE — 96365 THER/PROPH/DIAG IV INF INIT: CPT

## 2019-09-23 PROCEDURE — 96366 THER/PROPH/DIAG IV INF ADDON: CPT

## 2019-09-23 PROCEDURE — 80048 BASIC METABOLIC PNL TOTAL CA: CPT | Performed by: INTERNAL MEDICINE

## 2019-09-23 RX ORDER — SODIUM CHLORIDE 9 MG/ML
20 INJECTION, SOLUTION INTRAVENOUS CONTINUOUS
Status: DISCONTINUED | OUTPATIENT
Start: 2019-09-23 | End: 2019-09-26 | Stop reason: HOSPADM

## 2019-09-23 RX ORDER — SODIUM CHLORIDE 9 MG/ML
20 INJECTION, SOLUTION INTRAVENOUS CONTINUOUS
Status: CANCELLED
Start: 2019-09-26

## 2019-09-23 RX ADMIN — POTASSIUM CHLORIDE: 149 INJECTION, SOLUTION, CONCENTRATE INTRAVENOUS at 10:59

## 2019-09-23 RX ADMIN — SODIUM CHLORIDE 20 ML/HR: 0.9 INJECTION, SOLUTION INTRAVENOUS at 10:49

## 2019-09-23 NOTE — PROGRESS NOTES
Pt arrived on unit at 0930  Offers no complaints  States she feels pretty good today  Mediport accessed and labs drawn from port and sent to lab  Awaiting results

## 2019-09-23 NOTE — PROGRESS NOTES
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

## 2019-09-23 NOTE — PLAN OF CARE
Problem: INFECTION - ADULT  Goal: Absence of fever/infection during neutropenic period  Description  INTERVENTIONS:  - Monitor WBC    9/23/2019 1010 by Daniel Mulligan RN  Outcome: Progressing  9/23/2019 1009 by Daniel Mulligan RN  Outcome: Progressing

## 2019-09-26 ENCOUNTER — HOSPITAL ENCOUNTER (OUTPATIENT)
Dept: INFUSION CENTER | Facility: HOSPITAL | Age: 34
Discharge: HOME | End: 2019-09-26
Attending: INTERNAL MEDICINE

## 2019-09-29 ENCOUNTER — HOSPITAL ENCOUNTER (EMERGENCY)
Facility: HOSPITAL | Age: 34
Discharge: HOME | End: 2019-09-29
Attending: EMERGENCY MEDICINE | Admitting: EMERGENCY MEDICINE
Payer: COMMERCIAL

## 2019-09-29 VITALS
OXYGEN SATURATION: 100 % | RESPIRATION RATE: 13 BRPM | WEIGHT: 163.58 LBS | BODY MASS INDEX: 28.98 KG/M2 | SYSTOLIC BLOOD PRESSURE: 114 MMHG | DIASTOLIC BLOOD PRESSURE: 70 MMHG | HEART RATE: 73 BPM | HEIGHT: 63 IN | TEMPERATURE: 97.2 F

## 2019-09-29 DIAGNOSIS — E87.6 HYPOKALEMIA: ICD-10-CM

## 2019-09-29 DIAGNOSIS — R20.2 PARESTHESIAS: Primary | ICD-10-CM

## 2019-09-29 LAB
ANION GAP SERPL CALCULATED.3IONS-SCNC: 10 MMOL/L (ref 4–13)
BASOPHILS # BLD AUTO: 0.05 THOUSANDS/ΜL (ref 0–0.1)
BASOPHILS NFR BLD AUTO: 1 % (ref 0–1)
BUN SERPL-MCNC: 11 MG/DL (ref 5–25)
CALCIUM SERPL-MCNC: 7.8 MG/DL (ref 8.3–10.1)
CHLORIDE SERPL-SCNC: 108 MMOL/L (ref 100–108)
CO2 SERPL-SCNC: 22 MMOL/L (ref 21–32)
CREAT SERPL-MCNC: 1.01 MG/DL (ref 0.6–1.3)
EOSINOPHIL # BLD AUTO: 0.17 THOUSAND/ΜL (ref 0–0.61)
EOSINOPHIL NFR BLD AUTO: 2 % (ref 0–6)
ERYTHROCYTE [DISTWIDTH] IN BLOOD BY AUTOMATED COUNT: 13.9 % (ref 11.6–15.1)
GFR SERPL CREATININE-BSD FRML MDRD: 72 ML/MIN/1.73SQ M
GLUCOSE SERPL-MCNC: 83 MG/DL (ref 65–140)
HCT VFR BLD AUTO: 35.5 % (ref 34.8–46.1)
HGB BLD-MCNC: 11.3 G/DL (ref 11.5–15.4)
IMM GRANULOCYTES # BLD AUTO: 0.01 THOUSAND/UL (ref 0–0.2)
IMM GRANULOCYTES NFR BLD AUTO: 0 % (ref 0–2)
LYMPHOCYTES # BLD AUTO: 2.38 THOUSANDS/ΜL (ref 0.6–4.47)
LYMPHOCYTES NFR BLD AUTO: 33 % (ref 14–44)
MAGNESIUM SERPL-MCNC: 2.3 MG/DL (ref 1.6–2.6)
MCH RBC QN AUTO: 30.6 PG (ref 26.8–34.3)
MCHC RBC AUTO-ENTMCNC: 31.8 G/DL (ref 31.4–37.4)
MCV RBC AUTO: 96 FL (ref 82–98)
MONOCYTES # BLD AUTO: 0.47 THOUSAND/ΜL (ref 0.17–1.22)
MONOCYTES NFR BLD AUTO: 7 % (ref 4–12)
NEUTROPHILS # BLD AUTO: 4.1 THOUSANDS/ΜL (ref 1.85–7.62)
NEUTS SEG NFR BLD AUTO: 57 % (ref 43–75)
NRBC BLD AUTO-RTO: 0 /100 WBCS
PHOSPHATE SERPL-MCNC: 3.3 MG/DL (ref 2.7–4.5)
PLATELET # BLD AUTO: 243 THOUSANDS/UL (ref 149–390)
PMV BLD AUTO: 10.4 FL (ref 8.9–12.7)
POTASSIUM SERPL-SCNC: 3 MMOL/L (ref 3.5–5.3)
RBC # BLD AUTO: 3.69 MILLION/UL (ref 3.81–5.12)
SODIUM SERPL-SCNC: 140 MMOL/L (ref 136–145)
WBC # BLD AUTO: 7.18 THOUSAND/UL (ref 4.31–10.16)

## 2019-09-29 PROCEDURE — 99284 EMERGENCY DEPT VISIT MOD MDM: CPT

## 2019-09-29 PROCEDURE — 99284 EMERGENCY DEPT VISIT MOD MDM: CPT | Performed by: EMERGENCY MEDICINE

## 2019-09-29 PROCEDURE — 96365 THER/PROPH/DIAG IV INF INIT: CPT

## 2019-09-29 PROCEDURE — 82330 ASSAY OF CALCIUM: CPT | Performed by: EMERGENCY MEDICINE

## 2019-09-29 PROCEDURE — 84100 ASSAY OF PHOSPHORUS: CPT | Performed by: EMERGENCY MEDICINE

## 2019-09-29 PROCEDURE — 36415 COLL VENOUS BLD VENIPUNCTURE: CPT | Performed by: EMERGENCY MEDICINE

## 2019-09-29 PROCEDURE — 80048 BASIC METABOLIC PNL TOTAL CA: CPT | Performed by: EMERGENCY MEDICINE

## 2019-09-29 PROCEDURE — 83735 ASSAY OF MAGNESIUM: CPT | Performed by: EMERGENCY MEDICINE

## 2019-09-29 PROCEDURE — 85025 COMPLETE CBC W/AUTO DIFF WBC: CPT | Performed by: EMERGENCY MEDICINE

## 2019-09-29 PROCEDURE — 96366 THER/PROPH/DIAG IV INF ADDON: CPT

## 2019-09-29 PROCEDURE — 93005 ELECTROCARDIOGRAM TRACING: CPT

## 2019-09-29 RX ORDER — TOPIRAMATE 100 MG/1
100 TABLET, FILM COATED ORAL 2 TIMES DAILY
COMMUNITY
End: 2020-07-30 | Stop reason: HOSPADM

## 2019-09-29 RX ORDER — SUMATRIPTAN 100 MG/1
100 TABLET, FILM COATED ORAL ONCE AS NEEDED
Status: ON HOLD | COMMUNITY
End: 2020-09-16

## 2019-09-29 RX ORDER — POTASSIUM CHLORIDE 14.9 MG/ML
20 INJECTION INTRAVENOUS ONCE
Status: COMPLETED | OUTPATIENT
Start: 2019-09-29 | End: 2019-09-29

## 2019-09-29 RX ORDER — POTASSIUM CHLORIDE 20 MEQ/1
60 TABLET, EXTENDED RELEASE ORAL ONCE
Status: COMPLETED | OUTPATIENT
Start: 2019-09-29 | End: 2019-09-29

## 2019-09-29 RX ADMIN — SODIUM CHLORIDE 125 ML: 0.9 INJECTION, SOLUTION INTRAVENOUS at 21:03

## 2019-09-29 RX ADMIN — POTASSIUM CHLORIDE 20 MEQ: 14.9 INJECTION, SOLUTION INTRAVENOUS at 21:04

## 2019-09-29 RX ADMIN — POTASSIUM CHLORIDE 60 MEQ: 1500 TABLET, EXTENDED RELEASE ORAL at 21:00

## 2019-09-29 NOTE — ED PROVIDER NOTES
History  Chief Complaint   Patient presents with    Tingling     Pt states she started this morning with tingling to her face, hands, and legs  Has hx of hypokalemia  This is a 18-year-old female with a history of gastric bypass resulting in hypokalemia, migraine who presents with perioral tingling  The patient states that she woke up this morning feeling very weak, run down, and lightheaded  This continued throughout the day  As the day progressed, she started to experience perioral tingling as well as tingling in her bilateral hands and bilateral feet  The patient states that this is typical when she started to experience low potassium  She states that she has been doing well on her potassium chloride infusions and taking her p o  Potassium chloride at home  The patient has been taking them as prescribed  Denies any changes in medications or new symptoms  Denies fever/chills, nausea/vomiting, dizziness, numbness, headache, change in vision, URI symptoms, neck pain, chest pain, palpitations, shortness of breath, cough, back pain, flank pain, abdominal pain, diarrhea, hematochezia, melena, dysuria, hematuria, abnormal vaginal discharge/bleeding  Prior to Admission Medications   Prescriptions Last Dose Informant Patient Reported? Taking?    AMILoride 5 mg tablet  Self No Yes   Sig: Take 1 tablet (5 mg total) by mouth daily   SUMAtriptan (IMITREX) 100 mg tablet   Yes Yes   Sig: Take 100 mg by mouth daily at bedtime    amitriptyline (ELAVIL) 50 mg tablet   Yes Yes   Sig: Take 50 mg by mouth daily at bedtime   b complex-C-folic acid (NEPHRO-EDIE) 0 8 mg tablet   Yes Yes   Sig: Take 0 8 mg by mouth daily with dinner   ergocalciferol (ERGOCALCIFEROL) 85040 units capsule   Yes Yes   Sig: Take 50,000 Units by mouth once a week Patient takes this med on Mondays    ferrous sulfate 325 (65 Fe) mg tablet   Yes Yes   Sig: Take 325 mg by mouth every other day    meclizine (ANTIVERT) 25 mg tablet   Yes Yes Sig: Take 25 mg by mouth daily at bedtime   potassium chloride 10 % oral solution   No Yes   Sig: Take 60 meq PO twice a day And take 40 meq 3 times a day   Patient taking differently: 4 (four) times a day    thiamine 100 MG tablet   Yes Yes   Sig: Take 100 mg by mouth daily   topiramate (TOPAMAX) 100 mg tablet   Yes Yes   Sig: Take 100 mg by mouth 2 (two) times a day   zinc sulfate (ZINCATE) 220 mg capsule   No Yes   Sig: Take 1 capsule (220 mg total) by mouth daily      Facility-Administered Medications: None       Past Medical History:   Diagnosis Date    H  pylori infection     Hypokalemia     Migraine        Past Surgical History:   Procedure Laterality Date    ABDOMINAL SURGERY      APPENDECTOMY      CHOLECYSTECTOMY      COSMETIC SURGERY      FL GUIDED CENTRAL VENOUS ACCESS DEVICE INSERTION  12/21/2018    GASTRIC BYPASS      HYSTERECTOMY      TUNNELED VENOUS PORT PLACEMENT N/A 12/21/2018    Procedure: INSERTION VENOUS PORT (PORT-A-CATH); Surgeon: Lucas Paez DO;  Location: [facility];  Service: General       Family History   Problem Relation Age of Onset    Hypertension Father     Diabetes Father     Diabetes Maternal Grandfather      I have reviewed and agree with the history as documented  Social History     Tobacco Use    Smoking status: Never Smoker    Smokeless tobacco: Never Used   Substance Use Topics    Alcohol use: Never     Alcohol/week: 0 0 standard drinks     Frequency: Never    Drug use: No        Review of Systems   Constitutional: Negative for chills and fever  HENT: Negative for rhinorrhea, sore throat and trouble swallowing  Eyes: Negative for photophobia and visual disturbance  Respiratory: Negative for cough, chest tightness and shortness of breath  Cardiovascular: Negative for chest pain, palpitations and leg swelling  Gastrointestinal: Negative for abdominal pain, blood in stool, diarrhea, nausea and vomiting  Endocrine: Negative for polyuria  Genitourinary: Negative for dysuria, flank pain, hematuria, vaginal bleeding and vaginal discharge  Musculoskeletal: Negative for back pain and neck pain  Skin: Negative for color change and rash  Allergic/Immunologic: Negative for immunocompromised state  Neurological: Positive for light-headedness  Negative for dizziness, weakness, numbness and headaches  Tingling   All other systems reviewed and are negative  Physical Exam  Physical Exam   Constitutional: Vital signs are normal  She appears well-developed and well-nourished  She is cooperative  Non-toxic appearance  She does not have a sickly appearance  She does not appear ill  No distress  HENT:   Head: Normocephalic and atraumatic  Right Ear: Hearing, tympanic membrane, external ear and ear canal normal    Left Ear: Hearing, tympanic membrane, external ear and ear canal normal    Nose: Nose normal    Mouth/Throat: Uvula is midline, oropharynx is clear and moist and mucous membranes are normal  No tonsillar exudate  Eyes: Pupils are equal, round, and reactive to light  Conjunctivae, EOM and lids are normal    Neck: Trachea normal, normal range of motion and full passive range of motion without pain  Neck supple  No Brudzinski's sign and no Kernig's sign noted  No thyroid mass present  Cardiovascular: Normal rate, regular rhythm, normal heart sounds and normal pulses  No murmur heard  Pulmonary/Chest: Effort normal and breath sounds normal    Port site in right upper chest is clean, dry, intact  No evidence of infection  Abdominal: Soft  Normal appearance and bowel sounds are normal  There is no tenderness  There is no rigidity, no rebound, no guarding and no CVA tenderness  Neurological: She is alert  She has normal strength  No cranial nerve deficit or sensory deficit  Coordination and gait normal    Cranial nerves 2-12 intact, strength 5/5 throughout, sensation intact throughout    Visual fields normal  Normal finger-to-nose and heel-to-shin  Normal rapid alternating movements  Normal gait  No drift in bilateral upper or lower extremities  Psychiatric: She has a normal mood and affect   Her speech is normal and behavior is normal  Thought content normal        Vital Signs  ED Triage Vitals   Temperature Pulse Respirations Blood Pressure SpO2   09/29/19 1950 09/29/19 1950 09/29/19 2100 09/29/19 1950 09/29/19 1950   (!) 97 2 °F (36 2 °C) 89 14 110/65 93 %      Temp Source Heart Rate Source Patient Position - Orthostatic VS BP Location FiO2 (%)   09/29/19 1950 -- 09/29/19 1950 09/29/19 1950 --   Temporal  Lying Right arm       Pain Score       09/29/19 1950       No Pain           Vitals:    09/29/19 2100 09/29/19 2130 09/29/19 2200 09/29/19 2230   BP: 102/60 108/61 126/73 114/70   Pulse: 76 70 69 73   Patient Position - Orthostatic VS:             Visual Acuity      ED Medications  Medications   potassium chloride (K-DUR,KLOR-CON) CR tablet 60 mEq (60 mEq Oral Given 9/29/19 2100)   potassium chloride 20 mEq IVPB (premix) (0 mEq Intravenous Stopped 9/29/19 2256)   sodium chloride 0 9 % bolus 125 mL (0 mL Intravenous Stopped 9/29/19 2256)       Diagnostic Studies  Results Reviewed     Procedure Component Value Units Date/Time    Calcium, ionized [[id number]]  (Normal) Collected:  09/29/19 2002    Lab Status:  Final result Specimen:  Blood from Arm, Right Updated:  09/30/19 0812     Calcium, Ionized 1 14 mmol/L     Basic metabolic panel [[id number]]  (Abnormal) Collected:  09/29/19 2002    Lab Status:  Final result Specimen:  Blood from Arm, Right Updated:  09/29/19 2023     Sodium 140 mmol/L      Potassium 3 0 mmol/L      Chloride 108 mmol/L      CO2 22 mmol/L      ANION GAP 10 mmol/L      BUN 11 mg/dL      Creatinine 1 01 mg/dL      Glucose 83 mg/dL      Calcium 7 8 mg/dL      eGFR 72 ml/min/1 73sq m     Narrative:       [place] guidelines for Chronic Kidney Disease (CKD):     Stage 1 with normal or high GFR (GFR > 90 mL/min/1 73 square meters)    Stage 2 Mild CKD (GFR = 60-89 mL/min/1 73 square meters)    Stage 3A Moderate CKD (GFR = 45-59 mL/min/1 73 square meters)    Stage 3B Moderate CKD (GFR = 30-44 mL/min/1 73 square meters)    Stage 4 Severe CKD (GFR = 15-29 mL/min/1 73 square meters)    Stage 5 End Stage CKD (GFR <15 mL/min/1 73 square meters)  Note: GFR calculation is accurate only with a steady state creatinine    Phosphorus [[id number]]  (Normal) Collected:  09/29/19 2002    Lab Status:  Final result Specimen:  Blood from Arm, Right Updated:  09/29/19 2023     Phosphorus 3 3 mg/dL     Magnesium [[id number]]  (Normal) Collected:  09/29/19 2002    Lab Status:  Final result Specimen:  Blood from Arm, Right Updated:  09/29/19 2023     Magnesium 2 3 mg/dL     CBC and differential [[id number]]  (Abnormal) Collected:  09/29/19 2002    Lab Status:  Final result Specimen:  Blood from Arm, Right Updated:  09/29/19 2007     WBC 7 18 Thousand/uL      RBC 3 69 Million/uL      Hemoglobin 11 3 g/dL      Hematocrit 35 5 %      MCV 96 fL      MCH 30 6 pg      MCHC 31 8 g/dL      RDW 13 9 %      MPV 10 4 fL      Platelets 646 Thousands/uL      nRBC 0 /100 WBCs      Neutrophils Relative 57 %      Immat GRANS % 0 %      Lymphocytes Relative 33 %      Monocytes Relative 7 %      Eosinophils Relative 2 %      Basophils Relative 1 %      Neutrophils Absolute 4 10 Thousands/µL      Immature Grans Absolute 0 01 Thousand/uL      Lymphocytes Absolute 2 38 Thousands/µL      Monocytes Absolute 0 47 Thousand/µL      Eosinophils Absolute 0 17 Thousand/µL      Basophils Absolute 0 05 Thousands/µL                  No orders to display              Procedures  ECG 12 Lead Documentation Only  Date/Time: 9/29/2019 8:26 PM  Performed by: Silvia Newby MD  Authorized by: Silvia Newby MD     ECG reviewed by me, the ED Provider: yes    Patient location:  ED  Previous ECG:     Previous ECG:  Compared to current    Comparison ECG info:  8/6/19    Similarity:  No change    Comparison to cardiac monitor: Yes    Interpretation:     Interpretation: normal    Rate:     ECG rate:  77    ECG rate assessment: normal    Rhythm:     Rhythm: sinus rhythm    Ectopy:     Ectopy: none    QRS:     QRS axis:  Normal    QRS intervals:  Normal  Conduction:     Conduction: normal    ST segments:     ST segments:  Normal  T waves:     T waves: normal             ED Course  ED Course as of Sep 30 1640   Sun Sep 29, 2019   2007 Stable   Hemoglobin(!): 11 3                               MDM  Number of Diagnoses or Management Options  Diagnosis management comments: Concern for electrolyte abnormality  Will check EKG and electrolytes  Disposition pending results  Disposition  Final diagnoses:   Paresthesias   Hypokalemia     Time reflects when diagnosis was documented in both MDM as applicable and the Disposition within this note     Time User Action Codes Description Comment    9/29/2019 10:02 PM David Gómez Add [R20 2] Paresthesias     9/29/2019 10:02 PM David Gómez Add [E87 6] Hypokalemia       ED Disposition     ED Disposition Condition Date/Time Comment    Discharge Stable [place] Sep 29, 2019 10:02 PM Brian Rojas discharge to home/self care              Follow-up Information     Follow up With Specialties Details Why Contact Info Additional [address],  Nephrology Schedule an appointment as soon as possible for a visit   Simran Irizarry [address]       [facility] Emergency Department Emergency Medicine Go to  If symptoms worsen [phone] [id number] [phone] [place] ED, [address], [address]          Discharge Medication List as of 9/29/2019 10:49 PM      CONTINUE these medications which have NOT CHANGED    Details   AMILoride 5 mg tablet Take 1 tablet (5 mg total) by mouth daily, Starting Wed 2/20/2019, Normal amitriptyline (ELAVIL) 50 mg tablet Take 50 mg by mouth daily at bedtime, Starting Mon 8/27/2018, Until Sun 9/29/2019, Historical Med      b complex-C-folic acid (NEPHRO-EDIE) 0 8 mg tablet Take 0 8 mg by mouth daily with dinner, Historical Med      ergocalciferol (ERGOCALCIFEROL) 33648 units capsule Take 50,000 Units by mouth once a week Patient takes this med on Mondays , Historical Med      ferrous sulfate 325 (65 Fe) mg tablet Take 325 mg by mouth every other day , Historical Med      meclizine (ANTIVERT) 25 mg tablet Take 25 mg by mouth daily at bedtime, Starting Mon 8/27/2018, Historical Med      potassium chloride 10 % oral solution Take 60 meq PO twice a day And take 40 meq 3 times a day, Normal      SUMAtriptan (IMITREX) 100 mg tablet Take 100 mg by mouth once as needed for migraine, Historical Med      thiamine 100 MG tablet Take 100 mg by mouth daily, Historical Med      topiramate (TOPAMAX) 100 mg tablet Take 100 mg by mouth 2 (two) times a day, Historical Med      zinc sulfate (ZINCATE) 220 mg capsule Take 1 capsule (220 mg total) by mouth daily, Starting Fri 8/9/2019, Normal      zinc gluconate 50 mg tablet Take 50 mg by mouth daily, Historical Med           No discharge procedures on file      ED Provider  Electronically Signed by           Laura Sparrow MD  09/30/19 1640

## 2019-09-30 ENCOUNTER — HOSPITAL ENCOUNTER (OUTPATIENT)
Dept: INFUSION CENTER | Facility: HOSPITAL | Age: 34
Discharge: HOME | End: 2019-09-30
Attending: INTERNAL MEDICINE
Payer: COMMERCIAL

## 2019-09-30 ENCOUNTER — HOSPITAL ENCOUNTER (INPATIENT)
Facility: HOSPITAL | Age: 34
LOS: 2 days | Discharge: HOME | DRG: 425 | End: 2019-10-02
Attending: INTERNAL MEDICINE | Admitting: INTERNAL MEDICINE
Payer: COMMERCIAL

## 2019-09-30 ENCOUNTER — TELEPHONE (OUTPATIENT)
Dept: NEPHROLOGY | Facility: CLINIC | Age: 34
End: 2019-09-30

## 2019-09-30 VITALS
HEART RATE: 93 BPM | DIASTOLIC BLOOD PRESSURE: 57 MMHG | TEMPERATURE: 99.1 F | OXYGEN SATURATION: 100 % | SYSTOLIC BLOOD PRESSURE: 118 MMHG | RESPIRATION RATE: 18 BRPM

## 2019-09-30 DIAGNOSIS — E87.6 HYPOKALEMIA: Primary | ICD-10-CM

## 2019-09-30 LAB
ANION GAP SERPL CALCULATED.3IONS-SCNC: 11 MMOL/L (ref 4–13)
BUN SERPL-MCNC: 8 MG/DL (ref 5–25)
CA-I BLD-SCNC: 1.14 MMOL/L (ref 1.12–1.32)
CALCIUM SERPL-MCNC: 8 MG/DL (ref 8.3–10.1)
CHLORIDE SERPL-SCNC: 107 MMOL/L (ref 100–108)
CO2 SERPL-SCNC: 20 MMOL/L (ref 21–32)
CREAT SERPL-MCNC: 1 MG/DL (ref 0.6–1.3)
GFR SERPL CREATININE-BSD FRML MDRD: 73 ML/MIN/1.73SQ M
GLUCOSE SERPL-MCNC: 80 MG/DL (ref 65–140)
POTASSIUM SERPL-SCNC: 3.2 MMOL/L (ref 3.5–5.3)
SODIUM SERPL-SCNC: 138 MMOL/L (ref 136–145)

## 2019-09-30 PROCEDURE — 99220 PR INITIAL OBSERVATION CARE/DAY 70 MINUTES: CPT | Performed by: INTERNAL MEDICINE

## 2019-09-30 PROCEDURE — 90686 IIV4 VACC NO PRSV 0.5 ML IM: CPT | Performed by: INTERNAL MEDICINE

## 2019-09-30 PROCEDURE — 96365 THER/PROPH/DIAG IV INF INIT: CPT

## 2019-09-30 PROCEDURE — 96366 THER/PROPH/DIAG IV INF ADDON: CPT

## 2019-09-30 PROCEDURE — 80048 BASIC METABOLIC PNL TOTAL CA: CPT | Performed by: INTERNAL MEDICINE

## 2019-09-30 RX ORDER — TOPIRAMATE 25 MG/1
100 TABLET ORAL 2 TIMES DAILY
Status: DISCONTINUED | OUTPATIENT
Start: 2019-09-30 | End: 2019-10-02 | Stop reason: HOSPADM

## 2019-09-30 RX ORDER — THIAMINE MONONITRATE (VIT B1) 100 MG
100 TABLET ORAL DAILY
Status: DISCONTINUED | OUTPATIENT
Start: 2019-09-30 | End: 2019-10-02 | Stop reason: HOSPADM

## 2019-09-30 RX ORDER — ONDANSETRON 2 MG/ML
4 INJECTION INTRAMUSCULAR; INTRAVENOUS EVERY 6 HOURS PRN
Status: DISCONTINUED | OUTPATIENT
Start: 2019-09-30 | End: 2019-10-02 | Stop reason: HOSPADM

## 2019-09-30 RX ORDER — AMILORIDE HYDROCHLORIDE 5 MG/1
5 TABLET ORAL DAILY
Status: DISCONTINUED | OUTPATIENT
Start: 2019-10-01 | End: 2019-10-02 | Stop reason: HOSPADM

## 2019-09-30 RX ORDER — SODIUM CHLORIDE 9 MG/ML
20 INJECTION, SOLUTION INTRAVENOUS CONTINUOUS
Status: CANCELLED
Start: 2019-10-03

## 2019-09-30 RX ORDER — ACETAMINOPHEN 325 MG/1
650 TABLET ORAL EVERY 6 HOURS PRN
Status: DISCONTINUED | OUTPATIENT
Start: 2019-09-30 | End: 2019-10-02 | Stop reason: HOSPADM

## 2019-09-30 RX ORDER — POTASSIUM CHLORIDE AND SODIUM CHLORIDE 900; 300 MG/100ML; MG/100ML
75 INJECTION, SOLUTION INTRAVENOUS ONCE
Status: COMPLETED | OUTPATIENT
Start: 2019-09-30 | End: 2019-09-30

## 2019-09-30 RX ORDER — FERROUS SULFATE 325(65) MG
325 TABLET ORAL EVERY OTHER DAY
Status: DISCONTINUED | OUTPATIENT
Start: 2019-10-02 | End: 2019-10-02 | Stop reason: HOSPADM

## 2019-09-30 RX ORDER — AMITRIPTYLINE HYDROCHLORIDE 25 MG/1
50 TABLET, FILM COATED ORAL
Status: DISCONTINUED | OUTPATIENT
Start: 2019-09-30 | End: 2019-10-02 | Stop reason: HOSPADM

## 2019-09-30 RX ORDER — MECLIZINE HCL 12.5 MG/1
25 TABLET ORAL
Status: DISCONTINUED | OUTPATIENT
Start: 2019-09-30 | End: 2019-10-02 | Stop reason: HOSPADM

## 2019-09-30 RX ORDER — POTASSIUM CHLORIDE 20 MEQ/1
40 TABLET, EXTENDED RELEASE ORAL
Status: DISCONTINUED | OUTPATIENT
Start: 2019-09-30 | End: 2019-10-01

## 2019-09-30 RX ORDER — SODIUM CHLORIDE 9 MG/ML
20 INJECTION, SOLUTION INTRAVENOUS CONTINUOUS
Status: DISCONTINUED | OUTPATIENT
Start: 2019-09-30 | End: 2019-10-03 | Stop reason: HOSPADM

## 2019-09-30 RX ORDER — ZINC SULFATE 50(220)MG
220 CAPSULE ORAL DAILY
Status: DISCONTINUED | OUTPATIENT
Start: 2019-10-01 | End: 2019-10-02 | Stop reason: HOSPADM

## 2019-09-30 RX ADMIN — MECLIZINE 25 MG: 12.5 TABLET ORAL at 21:34

## 2019-09-30 RX ADMIN — POTASSIUM CHLORIDE 40 MEQ: 1500 TABLET, EXTENDED RELEASE ORAL at 17:18

## 2019-09-30 RX ADMIN — POTASSIUM CHLORIDE: 149 INJECTION, SOLUTION, CONCENTRATE INTRAVENOUS at 10:01

## 2019-09-30 RX ADMIN — AMITRIPTYLINE HYDROCHLORIDE 50 MG: 25 TABLET, FILM COATED ORAL at 21:34

## 2019-09-30 RX ADMIN — POTASSIUM CHLORIDE AND SODIUM CHLORIDE 75 ML/HR: 900; 300 INJECTION, SOLUTION INTRAVENOUS at 14:17

## 2019-09-30 RX ADMIN — POTASSIUM CHLORIDE 40 MEQ: 1500 TABLET, EXTENDED RELEASE ORAL at 14:17

## 2019-09-30 RX ADMIN — TOPIRAMATE 100 MG: 25 TABLET, FILM COATED ORAL at 17:18

## 2019-09-30 RX ADMIN — INFLUENZA VIRUS VACCINE 0.5 ML: 15; 15; 15; 15 SUSPENSION INTRAMUSCULAR at 14:18

## 2019-09-30 NOTE — PROGRESS NOTES
Spoke to Dr Katherine Oswald regarding patients direct admit status  Patient to finish 20meq, patient can be capped and sent to [facility] for direct admit  Patient will not receive 60meq at infusion center today  Patient verbally understands

## 2019-09-30 NOTE — TELEPHONE ENCOUNTER
Patient called stating that her potassium was doing really well and was hanging around 4 8  Since last week it went from that to 3 9 and then last night it was 3 0  She states that she went to the ER last night was given 60 orally of potassium and 20 IV of potassium  She states that she is at [facility] now getting infused but she is only at 3 2  She states that she is very symptotic and can't get her thoughts together  She states that her arms, face and hands feel like there asleep  She is asking for a possible direct admitted for 1 day or 2 before this gets any worse

## 2019-09-30 NOTE — PLAN OF CARE

## 2019-09-30 NOTE — PLAN OF CARE
Pt admitted the the Med Surg floor A+Ox4, denies chest pain and SOB  Accessed Mediport in place from [facility], steady gait  Oriented to the room and the use of the call bell, will continue to monitor

## 2019-09-30 NOTE — ASSESSMENT & PLAN NOTE
· Patient with chronic hypokalemia  · Will monitor on tele  · Continue potassium supplementation  · Consult Nephrology  · Repeat BMP in the morning

## 2019-09-30 NOTE — H&P
H&P- Pat Andrade 1985, 27 y o  female MRN: [id number]    Unit/Bed#: -02 Encounter: [id number]    Primary Care Provider: Torie Mancia DO   Date and time admitted to hospital: 9/30/2019 12:41 PM        * Hypokalemia  Assessment & Plan  · Patient with chronic hypokalemia  · Will monitor on tele  · Continue potassium supplementation  · Consult Nephrology  · Repeat BMP in the morning    Vitamin B12 deficiency  Assessment & Plan  · Continue supplementation    Migraine without aura and without status migrainosus, not intractable  Assessment & Plan  · Stable  · Continue home medications      VTE Prophylaxis: Low risk, early aggressive ambulation  Code Status: full code  POLST: There is no POLST form on file for this patient (pre-hospital)  Discussion with family:  No family available during my evaluation    Anticipated Length of Stay:  Patient will be admitted on an Observation basis with an anticipated length of stay of  < 2 midnights  Justification for Hospital Stay:  Hypokalemia    Chief Complaint:   Lab abnormality    History of Present Illness:    Pat Andrade is a 27 y o  female who presents with lab abnormality  Patient with history of hypokalemia for the last year  Patient receives infusions of potassium due to low levels  Patient had routine blood work done which showed low potassium level despite supplementation  Patient was sent in by PCP for supplementation and further monitoring  Patient states that she has been having some paresthesias of bilateral upper and lower extremities  She states that she gets these symptoms when her potassium level was low  Patient denies any chest pain or shortness of breath  No palpitations  No nausea or vomiting  No diarrhea  No fever or chills  Review of Systems:  Review of Systems   Constitutional: Positive for activity change and fatigue  Negative for chills and fever  Respiratory: Negative for shortness of breath      Cardiovascular: Negative for chest pain  Gastrointestinal: Negative for abdominal pain  Genitourinary: Negative for dysuria  Neurological: Negative for seizures, weakness, light-headedness and headaches  All other systems reviewed and are negative  Past Medical and Surgical History:   Past Medical History:   Diagnosis Date    H  pylori infection     Hypokalemia     Migraine        Past Surgical History:   Procedure Laterality Date    ABDOMINAL SURGERY      APPENDECTOMY      CHOLECYSTECTOMY      COSMETIC SURGERY      FL GUIDED CENTRAL VENOUS ACCESS DEVICE INSERTION  12/21/2018    GASTRIC BYPASS      HYSTERECTOMY      TUNNELED VENOUS PORT PLACEMENT N/A 12/21/2018    Procedure: INSERTION VENOUS PORT (PORT-A-CATH); Surgeon: Margret Martin DO;  Location: [facility];  Service: General       Meds/Allergies:  Prior to Admission medications    Medication Sig Start Date End Date Taking?  Authorizing Provider   AMILoride 5 mg tablet Take 1 tablet (5 mg total) by mouth daily 2/20/19  Yes Misti Link DO   amitriptyline (ELAVIL) 50 mg tablet Take 50 mg by mouth daily at bedtime 8/27/18 9/30/19 Yes Historical Provider, MD   b complex-C-folic acid (NEPHRO-EDIE) 0 8 mg tablet Take 0 8 mg by mouth daily with dinner   Yes Historical Provider, MD   ergocalciferol (ERGOCALCIFEROL) 50560 units capsule Take 50,000 Units by mouth once a week Patient takes this med on Mondays    Yes Historical Provider, MD   ferrous sulfate 325 (65 Fe) mg tablet Take 325 mg by mouth every other day    Yes Historical Provider, MD   meclizine (ANTIVERT) 25 mg tablet Take 25 mg by mouth daily at bedtime 8/27/18  Yes Historical Provider, MD   potassium chloride 10 % oral solution Take 60 meq PO twice a day And take 40 meq 3 times a day  Patient taking differently: 4 (four) times a day  7/7/19  Yes Velasquez Sr MD   SUMAtriptan (IMITREX) 100 mg tablet Take 100 mg by mouth daily at bedtime    Yes Historical Provider, MD   thiamine 100 MG tablet Take 100 mg by mouth daily   Yes Historical Provider, MD   topiramate (TOPAMAX) 100 mg tablet Take 100 mg by mouth 2 (two) times a day   Yes Historical Provider, MD   zinc sulfate (ZINCATE) 220 mg capsule Take 1 capsule (220 mg total) by mouth daily 8/9/19  Yes Laura Delatorre MD   zinc gluconate 50 mg tablet Take 50 mg by mouth daily  9/30/19 Yes Historical Provider, MD     I have reviewed home medications with patient personally  Allergies: Allergies   Allergen Reactions    Nsaids      Other reaction(s): Other (Please comment)  Should not take s/p bariatric surgery    Prednisone      Nausea, vomiting, diarrhea       Social History:  Marital Status: /Civil Union   Occupation:  None  Patient Pre-hospital Living Situation:  Lives with family  Patient Pre-hospital Level of Mobility:  Ambulatory  Patient Pre-hospital Diet Restrictions:  None  Substance Use History:   Social History     Substance and Sexual Activity   Alcohol Use Never    Alcohol/week: 0 0 standard drinks    Frequency: Never     Social History     Tobacco Use   Smoking Status Never Smoker   Smokeless Tobacco Never Used     Social History     Substance and Sexual Activity   Drug Use No       Family History:  I have reviewed the patients family history    Physical Exam:   Vitals:   Blood Pressure: 114/65 (09/30/19 1249)  Pulse: 77 (09/30/19 1249)  Temperature: 97 8 °F (36 6 °C) (09/30/19 1249)  Temp Source: Tympanic (09/30/19 1249)  Respirations: 18 (09/30/19 1249)  Height: 5' 3" (160 cm) (09/30/19 1242)  Weight - Scale: 71 2 kg (156 lb 15 5 oz) (09/30/19 1242)  SpO2: 100 % (09/30/19 1249)    Physical Exam   Constitutional: She appears well-developed  No distress  HENT:   Head: Normocephalic and atraumatic  Eyes: Conjunctivae and EOM are normal    Neck: Normal range of motion  Neck supple  Cardiovascular: Normal rate and regular rhythm  Pulmonary/Chest: Effort normal  No respiratory distress  Abdominal: Soft   She exhibits no distension  There is no tenderness  Musculoskeletal: Normal range of motion  She exhibits no edema  Neurological: She is alert  No cranial nerve deficit  Coordination normal    Skin: Skin is warm and dry  Psychiatric: She has a normal mood and affect  Her behavior is normal        Additional Data:   Lab Results: I have personally reviewed pertinent reports  Results from last 7 days   Lab Units 09/29/19 2002   WBC Thousand/uL 7 18   HEMOGLOBIN g/dL 11 3*   HEMATOCRIT % 35 5   PLATELETS Thousands/uL 243   NEUTROS PCT % 57   LYMPHS PCT % 33   MONOS PCT % 7   EOS PCT % 2     Results from last 7 days   Lab Units 09/30/19  0913   POTASSIUM mmol/L 3 2*   CHLORIDE mmol/L 107   CO2 mmol/L 20*   BUN mg/dL 8   CREATININE mg/dL 1 00   CALCIUM mg/dL 8 0*                   Imaging: I have personally reviewed pertinent reports  No orders to display       [employer] / Telepo Records Reviewed: Yes     ** Please Note: This note has been constructed using a voice recognition system   **

## 2019-09-30 NOTE — TELEPHONE ENCOUNTER
I spoke with the doctors at the hospital, will arrange for her to have an overnight  observation for now and see how she does with her blood work as well as how she feels

## 2019-10-01 LAB
ANION GAP SERPL CALCULATED.3IONS-SCNC: 6 MMOL/L (ref 4–13)
ATRIAL RATE: 77 BPM
BUN SERPL-MCNC: 9 MG/DL (ref 7–25)
CALCIUM SERPL-MCNC: 8 MG/DL (ref 8.6–10.5)
CHLORIDE SERPL-SCNC: 114 MMOL/L (ref 98–107)
CO2 SERPL-SCNC: 19 MMOL/L (ref 21–31)
CREAT SERPL-MCNC: 0.95 MG/DL (ref 0.6–1.2)
GFR SERPL CREATININE-BSD FRML MDRD: 78 ML/MIN/1.73SQ M
GLUCOSE P FAST SERPL-MCNC: 84 MG/DL (ref 65–99)
GLUCOSE SERPL-MCNC: 84 MG/DL (ref 65–99)
MAGNESIUM SERPL-MCNC: 2.3 MG/DL (ref 1.9–2.7)
P AXIS: 65 DEGREES
POTASSIUM SERPL-SCNC: 3.4 MMOL/L (ref 3.5–5.5)
PR INTERVAL: 130 MS
QRS AXIS: 37 DEGREES
QRSD INTERVAL: 102 MS
QT INTERVAL: 380 MS
QTC INTERVAL: 430 MS
SODIUM SERPL-SCNC: 139 MMOL/L (ref 134–143)
T WAVE AXIS: 42 DEGREES
VENTRICULAR RATE: 77 BPM

## 2019-10-01 PROCEDURE — 93010 ELECTROCARDIOGRAM REPORT: CPT | Performed by: INTERNAL MEDICINE

## 2019-10-01 PROCEDURE — 99232 SBSQ HOSP IP/OBS MODERATE 35: CPT | Performed by: NURSE PRACTITIONER

## 2019-10-01 PROCEDURE — 80048 BASIC METABOLIC PNL TOTAL CA: CPT | Performed by: INTERNAL MEDICINE

## 2019-10-01 PROCEDURE — 99215 OFFICE O/P EST HI 40 MIN: CPT | Performed by: INTERNAL MEDICINE

## 2019-10-01 PROCEDURE — 83735 ASSAY OF MAGNESIUM: CPT | Performed by: INTERNAL MEDICINE

## 2019-10-01 RX ORDER — POTASSIUM CHLORIDE 14.9 MG/ML
20 INJECTION INTRAVENOUS
Status: DISCONTINUED | OUTPATIENT
Start: 2019-10-01 | End: 2019-10-01

## 2019-10-01 RX ORDER — POTASSIUM CHLORIDE 20MEQ/15ML
40 LIQUID (ML) ORAL
Status: DISCONTINUED | OUTPATIENT
Start: 2019-10-01 | End: 2019-10-02 | Stop reason: HOSPADM

## 2019-10-01 RX ADMIN — POTASSIUM CHLORIDE 20 MEQ: 149 INJECTION, SOLUTION, CONCENTRATE INTRAVENOUS at 09:57

## 2019-10-01 RX ADMIN — Medication 100 MG: at 08:45

## 2019-10-01 RX ADMIN — TOPIRAMATE 100 MG: 25 TABLET, FILM COATED ORAL at 08:45

## 2019-10-01 RX ADMIN — POTASSIUM CHLORIDE 40 MEQ: 20 SOLUTION ORAL at 14:06

## 2019-10-01 RX ADMIN — AMILORIDE HYDROCLORIDE 5 MG: 5 TABLET ORAL at 08:44

## 2019-10-01 RX ADMIN — POTASSIUM CHLORIDE 40 MEQ: 1500 TABLET, EXTENDED RELEASE ORAL at 08:44

## 2019-10-01 RX ADMIN — POTASSIUM CHLORIDE 40 MEQ: 20 SOLUTION ORAL at 17:27

## 2019-10-01 RX ADMIN — ZINC SULFATE CAP 220 MG (50 MG ELEMENTAL ZN) 50 MG: 220 (50 ZN) CAP at 08:54

## 2019-10-01 RX ADMIN — POTASSIUM CHLORIDE 20 MEQ: 149 INJECTION, SOLUTION, CONCENTRATE INTRAVENOUS at 14:01

## 2019-10-01 RX ADMIN — POTASSIUM CHLORIDE 40 MEQ: 20 SOLUTION ORAL at 23:05

## 2019-10-01 RX ADMIN — POTASSIUM CHLORIDE 20 MEQ: 149 INJECTION, SOLUTION, CONCENTRATE INTRAVENOUS at 11:40

## 2019-10-01 RX ADMIN — MECLIZINE 25 MG: 12.5 TABLET ORAL at 23:06

## 2019-10-01 RX ADMIN — AMITRIPTYLINE HYDROCHLORIDE 50 MG: 25 TABLET, FILM COATED ORAL at 23:06

## 2019-10-01 RX ADMIN — TOPIRAMATE 100 MG: 25 TABLET, FILM COATED ORAL at 17:27

## 2019-10-01 RX ADMIN — POTASSIUM CHLORIDE 40 MEQ: 20 SOLUTION ORAL at 11:45

## 2019-10-01 NOTE — UTILIZATION REVIEW
Notification of Inpatient Admission/Inpatient Authorization Request  This is a Notification of Inpatient Admission/Request for Inpatient Authorization for our facility [address] [place]  Be advised that this patient was admitted to our facility under Inpatient Status  Please contact the Utilization Review Department where the patient is receiving care services for additional admission information  Place of Service Code: 24   Place of Service Name: Inpatient Hospital  Presentation Date & Time: 9/30/2019 12:41 PM  Inpatient Admission Date & Time: 9/30/19 1241  Discharge Date & Time: No discharge date for patient encounter  Discharge Disposition (if discharged): Home/Self Care  Attending Physician & NPI#: Nay Sparks Md [[id number]]   Attending Physician:  HOMAR Xiao  [facility] ID- [id number]  [address]  Phone 1: [phone]  Fax: [phone]  Admission Orders (From admission, onward)     Ordered        10/01/19 1213  Inpatient Admission  Once         09/30/19 1308  Place in Observation  Once                   Facility: [facility] Utilization Review Department  Phone: [phone]; Fax [phone]  [email or web address]  ATTENTION: Please call with any questions or concerns to [phone]  and carefully listen to the prompts so that you are directed to the right person  Send all requests for admission clinical reviews, approved or denied determinations and any other requests to fax [phone]   All voicemails are confidential

## 2019-10-01 NOTE — PROGRESS NOTES
Progress Note - Oneida Cochran 1985, 27 y o  female MRN: [id number]    Unit/Bed#: -02 Encounter: [id number]    Primary Care Provider: Yunior Lake DO   Date and time admitted to hospital: 2019 12:41 PM        * Hypokalemia  Assessment & Plan  · Patient with chronic hypokalemia with numbness and tingling on her lips and finger tips  Clinically is improving  · Will monitor on tele  · Continue potassium supplementation- will continue with 40 mEq 5 times daily at home on discharge  · Nephrology input appreciated- the patient will be receiving 60 mEq of IV potassium today      Vitamin B12 deficiency  Assessment & Plan  · Continue supplementation      Migraine without aura and without status migrainosus, not intractable  Assessment & Plan  · Stable  · Continue home medications  · Monitor closely       VTE Pharmacologic Prophylaxis: Pharmacologic: Enoxaparin (Lovenox)    Patient Centered Rounds: I have performed bedside rounds with nursing staff today  Discussions with Specialists or Other Care Team Provider: nursing, CM, nephrology  Education and Discussions with Family / Patient:  Patient    Current Length of Stay: 1 day(s)    Current Patient Status: Observation   Certification Statement: The patient, admitted on an observation basis, will now require > 2 midnight hospital stay due to requiring IV K supplement    Discharge Plan: hopefully in AM     Code Status: Level 1 - Full Code    Subjective:   Feeling slightly better  Numbness and tingling in the fingertips is much improved  Feeling somewhat weak overall  Objective:     Vitals:   Temp (24hrs), Av 2 °F (36 8 °C), Min:97 8 °F (36 6 °C), Max:98 8 °F (37 1 °C)    Temp:  [97 8 °F (36 6 °C)-98 8 °F (37 1 °C)] 98 8 °F (37 1 °C)  HR:  [75-88] 88  Resp:  [16-18] 18  BP: ()/(52-65) 108/53  SpO2:  [97 %-100 %] 98 %  Body mass index is 28 35 kg/m²  Input and Output Summary (last 24 hours):        Intake/Output Summary (Last 24 hours) at 10/1/2019 0948  Last data filed at 9/30/2019 1700  Gross per 24 hour   Intake 260 ml   Output --   Net 260 ml       Physical Exam:     Physical Exam   Constitutional: She is oriented to person, place, and time  She appears well-developed and well-nourished  No distress  HENT:   Head: Normocephalic and atraumatic  Mouth/Throat: Oropharynx is clear and moist    Eyes: Pupils are equal, round, and reactive to light  Conjunctivae and EOM are normal    Neck: Normal range of motion  Neck supple  Cardiovascular: Normal rate, regular rhythm and normal heart sounds  Exam reveals no gallop and no friction rub  No murmur heard  Pulmonary/Chest: Effort normal and breath sounds normal  She has no wheezes  She has no rales  Abdominal: Soft  Bowel sounds are normal  She exhibits no distension  There is no tenderness  There is no rebound  Musculoskeletal: Normal range of motion  She exhibits no edema, tenderness or deformity  Neurological: She is alert and oriented to person, place, and time  No cranial nerve deficit  Skin: Skin is warm and dry  No rash noted  No erythema  Psychiatric: She has a normal mood and affect  Her behavior is normal  Judgment and thought content normal    Vitals reviewed  Additional Data:     Labs:    Results from last 7 days   Lab Units 09/29/19 2002   WBC Thousand/uL 7 18   HEMOGLOBIN g/dL 11 3*   HEMATOCRIT % 35 5   PLATELETS Thousands/uL 243   NEUTROS PCT % 57   LYMPHS PCT % 33   MONOS PCT % 7   EOS PCT % 2     Results from last 7 days   Lab Units 10/01/19  0558   POTASSIUM mmol/L 3 4*   CHLORIDE mmol/L 114*   CO2 mmol/L 19*   BUN mg/dL 9   CREATININE mg/dL 0 95   CALCIUM mg/dL 8 0*                   * I Have Reviewed All Lab Data Listed Above  * Additional Pertinent Lab Tests Reviewed:  Veto 66 Admission  Reviewed    Imaging:  Imaging Reports Reviewed Today Include: n/a     Recent Cultures (last 7 days):           Last 24 Hours Medication List: Current Facility-Administered Medications:  acetaminophen 650 mg Oral Q6H PRN Carlos Soto MD   AMILoride 5 mg Oral Daily Carlos Soto MD   amitriptyline 50 mg Oral HS Carlos Soto MD   [START ON 10/2/2019] ferrous sulfate 325 mg Oral Every Other Day Carlos Soto MD   meclizine 25 mg Oral HS Carlos Soto MD   ondansetron 4 mg Intravenous Q6H PRN Carlos Soto MD   potassium chloride 40 mEq Oral 5x Daily Barak Strauss DO   potassium chloride 20 mEq Intravenous Q2H Juan Carlos Ricketts DO   thiamine 100 mg Oral Daily Carlos Soto MD   topiramate 100 mg Oral BID Carlos Soto MD   zinc sulfate 220 mg Oral Daily Carlos Soto MD     Facility-Administered Medications Ordered in Other Encounters:  [MAR Hold] sodium chloride 20 mL/hr Intravenous Continuous Barak Strauss DO        Today, Patient Was Seen By: ARIADNA Mckeon    ** Please Note: Dictation voice to text software may have been used in the creation of this document   **

## 2019-10-01 NOTE — ASSESSMENT & PLAN NOTE
· Patient with chronic hypokalemia with numbness and tingling on her lips and finger tips  Clinically is improving     · Will monitor on tele  · Continue potassium supplementation- will continue with 40 mEq 5 times daily at home on discharge  · Nephrology input appreciated- the patient will be receiving 60 mEq of IV potassium today

## 2019-10-01 NOTE — SOCIAL WORK
Visit patient in her hospital room to initiate discharge planning  Patient lives in a 1 story home without any steps to enter home  She is completely independent, ambulates without device, drives  They use [facility] pharmacy in [facility] and deny any problem obtaining their meds     Observation notice given to patient, questions answered, form signed by patient  Brochure given  CM reviewed d/c planning process including the following: identifying help at home, patient preference for d/c planning needs, availability of treatment team to discuss questions or concerns patient and/or family may have regarding understanding medications and recognizing signs and symptoms once discharged  CM also encouraged patient to follow up with all recommended appointments after discharge  Patient advised of importance for patient and family to participate in managing patients medical well being

## 2019-10-01 NOTE — UTILIZATION REVIEW
Initial Clinical Review    Admission: Date/Time/Statement:   ADMIT OBS  On  9/30  @  1308             CHANGED to INPT STATUS  On   10/1  @  1213        For ongoing IV K Repletion     Inpatient Admission Once     Transfer Service: General Medicine    Expected Discharge Date: 10/02/19       Question Answer   Admitting Physician Chester Frederick   Level of Care Med Surg   Bed request comments telemetry   Estimated length of stay More than 2 Midnights   Certification I certify that inpatient services are medically necessary for this patient for a duration of greater than two midnights  See H&P and MD Progress Notes for additional information about the patient's course of treatment  Assessment/Plan:     26 yo female  Directly admitted to [facility],  OBS status,  [facility] level of care for treatment of symptomatic hypokalemia  Pt w/h/o  Gastric bypass surgery resulting in chronic hypokalemia:  Takes daily po K supplementation and routine IV KCl  Infusions  9/29  Presented to ER for c/o perioral tingling and paresthesias to b/l hands & feet plus weakness and lightheadedness  K level 3 0 at that time  Given Kdur 60 meq po,  IV Kcl 20 meq, dc to home  9/30  At Infusion center,  Ordered IV  KCl  60 meq infusion  And  IV KCl 40 meq infusion  Infusion therapy was not completed,  Pt finished 20 meq and sent to [facility] for continued K repletion and nephrology consultation  10/1  Nephrology: Will place the patient back to her typical oral potassium supplementation which is 40 mEq 5 times a day  In addition to this, will order 60 mEq of IV potassium  Phosphorus levels appropriate  Acidosis - will order urine crtK studies  10/1  INTERNAL MEDICINE  Clinically is improving     Will monitor on tele,  Increase po repletion to 40 mEq 5 times daily  And give  60 mEq of IV potassium today    [facility]        ED Triage Vitals   Temperature Pulse Respirations Blood Pressure SpO2   09/30/19 1249 09/30/19 1249 09/30/19 1249 09/30/19 1249 09/30/19 1249   97 8 °F (36 6 °C) 77 18 114/65 100 %      Temp Source Heart Rate Source Patient Position - Orthostatic VS BP Location FiO2 (%)   09/30/19 1249 09/30/19 1249 09/30/19 1249 09/30/19 1249 --   Tympanic Monitor Sitting Left arm       Pain Score       09/30/19 1300       No Pain        Wt Readings from Last 1 Encounters:   10/01/19 72 6 kg (160 lb 0 9 oz)     Additional Vital Signs:   10/01/19 0745  98 8 °F (37 1 °C)  88  18  108/53   09/30/19 2211  98 4 °F (36 9 °C)  75  16  87/52  Abnormal    09/30/19 1543  97 9 °F (36 6 °C)  82  18  104/54       Pertinent Labs/Diagnostic Test Results:   9/30  EKG -  nsr     Results from last 7 days   Lab Units 09/29/19 2002   WBC Thousand/uL 7 18   HEMOGLOBIN g/dL 11 3*   HEMATOCRIT % 35 5   PLATELETS Thousands/uL 243   NEUTROS ABS Thousands/µL 4 10         Results from last 7 days   Lab Units 10/01/19  0558 09/30/19  0913 09/29/19  2002   SODIUM mmol/L 139 138 140   POTASSIUM mmol/L 3 4* 3 2* 3 0*   CHLORIDE mmol/L 114* 107 108   CO2 mmol/L 19* 20* 22   ANION GAP mmol/L 6 11 10   BUN mg/dL 9 8 11   CREATININE mg/dL 0 95 1 00 1 01   EGFR ml/min/1 73sq m 78 73 72   CALCIUM mg/dL 8 0* 8 0* 7 8*   CALCIUM, IONIZED mmol/L  --   --  1 14   MAGNESIUM mg/dL 2 3  --  2 3   PHOSPHORUS mg/dL  --   --  3 3     Results from last 7 days   Lab Units 10/01/19  0558 09/30/19  0913 09/29/19  2002   GLUCOSE RANDOM mg/dL 84 80 83       Past Medical History:   Diagnosis Date    H  pylori infection     Hypokalemia     Migraine      Present on Admission:   Hypokalemia   Migraine without aura and without status migrainosus, not intractable   Vitamin B12 deficiency      Admitting Diagnosis: Hypocalcemia [E83 51]  Age/Sex: 27 y o  female  Admission Orders:  KDur 40 meq po TID; Continuous  IVF NS w/40 meq KCL  @  75 cc/hr  On 10/1:  Given 20 meq KCL in 100 ml D5W   q 2 hr x3 ;  Increase po KCL to 40 meq 5x daily     Current Facility-Administered Medications:  acetaminophen 650 mg Oral Q6H PRN   AMILoride 5 mg Oral Daily   amitriptyline 50 mg Oral HS   [START ON 10/2/2019] ferrous sulfate 325 mg Oral Every Other Day   meclizine 25 mg Oral HS   ondansetron 4 mg Intravenous Q6H PRN   potassium chloride 40 mEq Oral 5x Daily   potassium chloride 20 mEq Intravenous Q2H   thiamine 100 mg Oral Daily   topiramate 100 mg Oral BID   zinc sulfate 220 mg Oral Daily       Network Utilization Review Department  Phone: [phone]; Fax [phone]  [email or web address]  ATTENTION: Please call with any questions or concerns to [phone]  and carefully listen to the prompts so that you are directed to the right person  Send all requests for admission clinical reviews, approved or denied determinations and any other requests to fax [phone]   All voicemails are confidential

## 2019-10-01 NOTE — CONSULTS
Consultation - Nephrology   Kathia Guzman 27 y o  female MRN: [id number]  Unit/Bed#: -02 Encounter: [id number]      A/P:  1  Hypokalemia                Will place the patient back to her typical oral potassium supplementation which is 40 mEq 5 times a day  In addition to this, will order 60 mEq of IV potassium  As appropriately to the patient regarding further evaluation and now that the patient has had a change in her insurance she should be eligible to be evaluated by [facility] either diagnostic dilemma department  Will send through that referral in the very near future  2  History of hypophosphatemia              Phosphorus levels appropriate, continue to monitor  3  Acidosis   Patient denies diarrhea, unclear if this is due to respiratory alkalosis  Will not check an ABG to confirm this at this time  Of note, persistent respiratory alkalosis can in some ways place more potassium into the patient's urine, but it is doubtful that there is a major component of acute urinary wasting of potassium at this time  Will confirm with a urine study to measure urine creatinine and urine potassium  4  Gastric bypass surgery         Thank you for allowing us to participate in the care of your patient  Please feel free to contact us regarding the care of this patient, or any other questions/concerns that may be applicable      Patient Active Problem List   Diagnosis    Hypokalemia    History of gastric bypass    Migraine without aura and without status migrainosus, not intractable    Left-sided weakness    Hypophosphatemia    Anemia    Vitamin D deficiency    Weakness of both lower extremities    Weakness of both upper extremities    Elevated CPK    Vitamin B12 deficiency    Cervical lymphadenopathy    Fatigue    Paresthesia of both lower extremities    Paresthesias    B12 deficiency    Gastric bypass status for obesity    GERD (gastroesophageal reflux disease)    Migraine    WAGNER (obstructive sleep apnea)    Other intestinal malabsorption    Acute kidney injury ([facility] [address] )    Right ovarian cyst    Chest pain    Intractable nausea and vomiting    Vertigo    Stress fracture of right foot with routine healing       History of Present Illness   Physician Requesting Consult: London Solorzano MD  Reason for Consult / Principal Problem:  Hypokalemia  Hx and PE limited by:   HPI: Neris Jones is a 27y o  year old female who presented the hospital as a direct admission account of request from my office  Patient is known to us due to her persistent and severe hypokalemia  She has had a for aggressive workup during several hospitalizations including at the [address] [place] [address], as well as other hospitals systems  Patient is maintained with approximately 200 mEq of oral potassium chloride intake a day  In addition to this, she has to IV potassium infusions scheduled on a weekly basis of which he typically receives 40 or 60 mEq had each infusion  On September 30th, the patient was at the infusion center complaining of appropriate tingling as well as tingling in the digits  Patient presented to the [address] [place] [address] for more aggressive IV potassium supplementation  Although typically the patient's potassium level was well below 3 with the symptoms, patient was symptomatic with potassium level of around 3 millimole/L  This morning status post IV supplementation oral supplementation, the patient's potassium is around 3 4 millimole/L patient continues to have some mild complaints of periorbital as well as digit paresthesias  In addition, the patient's bicarbonate level is a little reduced, she denies any diarrhea  Patient's prior charts were reviewed in the electronic medical records  Patient also denies any use of nonsteroidal anti-inflammatory medications      History obtained from chart review and the patient    Review of Systems - Negative except as mentioned above in HPI, more specifics as mentioned below  Review of Systems - General ROS: positive for  - fatigue  Psychological ROS: negative  Ophthalmic ROS: negative  ENT ROS: negative  Allergy and Immunology ROS: negative  Hematological and Lymphatic ROS: negative  Endocrine ROS: negative  Respiratory ROS: no cough, shortness of breath, or wheezing  Cardiovascular ROS: no chest pain or dyspnea on exertion  Gastrointestinal ROS: no abdominal pain, change in bowel habits, or black or bloody stools  Genito-Urinary ROS: no dysuria, trouble voiding, or hematuria  Musculoskeletal ROS: positive for - muscular weakness  Neurological ROS:  As mentioned above  Dermatological ROS: negative    Historical Information   Past Medical History:   Diagnosis Date    H  pylori infection     Hypokalemia     Migraine      Past Surgical History:   Procedure Laterality Date    ABDOMINAL SURGERY      APPENDECTOMY      CHOLECYSTECTOMY      COSMETIC SURGERY      FL GUIDED CENTRAL VENOUS ACCESS DEVICE INSERTION  12/21/2018    GASTRIC BYPASS      HYSTERECTOMY      TUNNELED VENOUS PORT PLACEMENT N/A 12/21/2018    Procedure: INSERTION VENOUS PORT (PORT-A-CATH);   Surgeon: Estelita Arguello DO;  Location: [facility];  Service: General     Social History   Social History     Substance and Sexual Activity   Alcohol Use Never    Alcohol/week: 0 0 standard drinks    Frequency: Never     Social History     Substance and Sexual Activity   Drug Use No     Social History     Tobacco Use   Smoking Status Never Smoker   Smokeless Tobacco Never Used     Family History   Problem Relation Age of Onset    Hypertension Father     Diabetes Father     Diabetes Maternal Grandfather        Meds/Allergies   all current active meds have been reviewed, current meds:   Current Facility-Administered Medications   Medication Dose Route Frequency    acetaminophen (TYLENOL) tablet 650 mg  650 mg Oral Q6H PRN    AMILoride tablet 5 mg  5 mg Oral Daily    amitriptyline (ELAVIL) tablet 50 mg  50 mg Oral HS    [START ON 10/2/2019] ferrous sulfate tablet 325 mg  325 mg Oral Every Other Day    meclizine (ANTIVERT) tablet 25 mg  25 mg Oral HS    ondansetron (ZOFRAN) injection 4 mg  4 mg Intravenous Q6H PRN    potassium chloride (K-DUR,KLOR-CON) CR tablet 40 mEq  40 mEq Oral TID With Meals    potassium chloride 20 mEq IVPB (premix)  20 mEq Intravenous Q2H    thiamine (VITAMIN B1) tablet 100 mg  100 mg Oral Daily    topiramate (TOPAMAX) tablet 100 mg  100 mg Oral BID    zinc sulfate (ZINCATE) capsule 220 mg  220 mg Oral Daily     Facility-Administered Medications Ordered in Other Encounters   Medication Dose Route Frequency    [MAR Hold] sodium chloride 0 9 % infusion  20 mL/hr Intravenous Continuous    and PTA meds:    Medications Prior to Admission   Medication    AMILoride 5 mg tablet    amitriptyline (ELAVIL) 50 mg tablet    b complex-C-folic acid (NEPHRO-EDIE) 0 8 mg tablet    ergocalciferol (ERGOCALCIFEROL) 47867 units capsule    ferrous sulfate 325 (65 Fe) mg tablet    meclizine (ANTIVERT) 25 mg tablet    potassium chloride 10 % oral solution    SUMAtriptan (IMITREX) 100 mg tablet    thiamine 100 MG tablet    topiramate (TOPAMAX) 100 mg tablet    zinc sulfate (ZINCATE) 220 mg capsule         Allergies   Allergen Reactions    Nsaids      Other reaction(s): Other (Please comment)  Should not take s/p bariatric surgery    Prednisone      Nausea, vomiting, diarrhea       Objective     Intake/Output Summary (Last 24 hours) at 10/1/2019 0852  Last data filed at 9/30/2019 1700  Gross per 24 hour   Intake 260 ml   Output --   Net 260 ml       Invasive Devices:        Physical Exam      I/O last 3 completed shifts:   In: 260 [P O :260]  Out: -     Vitals:    10/01/19 0745   BP: 108/53   Pulse: 88   Resp: 18   Temp: 98 8 °F (37 1 °C)   SpO2: 98%       Gen: in NAD, Alert/Awake  HEENT: no sclerous icterus, MMM, neck supple  CV: +S1/S2, RRR  Lungs: CTA bilaterally  Abd: +BS, soft NT/ND  Ext: all four extremities are warm  Skin: no rashes noted  Neuro: CN II-XII intact    Current Weight: Weight - Scale: 72 6 kg (160 lb 0 9 oz)  First Weight: Weight - Scale: 71 2 kg (156 lb 15 5 oz)    Lab Results:  I have personally reviewed pertinent labs  CBC: No results found for: WBC, HGB, HCT, MCV, PLT, ADJUSTEDWBC, MCH, MCHC, RDW, MPV, NRBC  CMP:   Lab Results   Component Value Date    K 3 4 (L) 10/01/2019     (H) 10/01/2019    CO2 19 (L) 10/01/2019    BUN 9 10/01/2019    CREATININE 0 95 10/01/2019    CALCIUM 8 0 (L) 10/01/2019    EGFR 78 10/01/2019     Phosphorus: No results found for: PHOS  Magnesium:   Lab Results   Component Value Date    MG 2 3 10/01/2019     Urinalysis: No results found for: [place], SPECGRAV, PHUR, LEUKOCYTESUR, NITRITE, PROTEINUA, GLUCOSEU, KETONESU, BILIRUBINUR, BLOODU  Ionized Calcium: No results found for: CAION  Coagulation: No results found for: PT, INR, APTT  Troponin: No results found for: TROPONINI  ABG: No results found for: PHART, XVN1TYG, PO2ART, WZK2OJI, Y4GEIKSA, BEART, SOURCE    Results from last 7 days   Lab Units 10/01/19  0558 09/30/19  0913 09/29/19 2002   POTASSIUM mmol/L 3 4* 3 2* 3 0*   CHLORIDE mmol/L 114* 107 108   CO2 mmol/L 19* 20* 22   BUN mg/dL 9 8 11   CREATININE mg/dL 0 95 1 00 1 01   CALCIUM mg/dL 8 0* 8 0* 7 8*       Radiology review:  No results found        Xiomara Mccarty DO

## 2019-10-02 VITALS
HEART RATE: 85 BPM | WEIGHT: 158.73 LBS | TEMPERATURE: 97.9 F | DIASTOLIC BLOOD PRESSURE: 52 MMHG | HEIGHT: 63 IN | BODY MASS INDEX: 28.12 KG/M2 | SYSTOLIC BLOOD PRESSURE: 108 MMHG | RESPIRATION RATE: 18 BRPM | OXYGEN SATURATION: 98 %

## 2019-10-02 LAB
ANION GAP SERPL CALCULATED.3IONS-SCNC: 6 MMOL/L (ref 4–13)
BUN SERPL-MCNC: 9 MG/DL (ref 7–25)
CALCIUM SERPL-MCNC: 8.3 MG/DL (ref 8.6–10.5)
CHLORIDE SERPL-SCNC: 114 MMOL/L (ref 98–107)
CO2 SERPL-SCNC: 19 MMOL/L (ref 21–31)
CREAT SERPL-MCNC: 0.98 MG/DL (ref 0.6–1.2)
GFR SERPL CREATININE-BSD FRML MDRD: 75 ML/MIN/1.73SQ M
GLUCOSE SERPL-MCNC: 90 MG/DL (ref 65–99)
POTASSIUM SERPL-SCNC: 3.3 MMOL/L (ref 3.5–5.5)
SODIUM SERPL-SCNC: 139 MMOL/L (ref 134–143)

## 2019-10-02 PROCEDURE — 99239 HOSP IP/OBS DSCHRG MGMT >30: CPT | Performed by: NURSE PRACTITIONER

## 2019-10-02 PROCEDURE — 80048 BASIC METABOLIC PNL TOTAL CA: CPT | Performed by: NURSE PRACTITIONER

## 2019-10-02 PROCEDURE — 99232 SBSQ HOSP IP/OBS MODERATE 35: CPT | Performed by: INTERNAL MEDICINE

## 2019-10-02 RX ORDER — POTASSIUM CHLORIDE 29.8 MG/ML
40 INJECTION INTRAVENOUS ONCE
Status: DISCONTINUED | OUTPATIENT
Start: 2019-10-02 | End: 2019-10-02

## 2019-10-02 RX ORDER — POTASSIUM CHLORIDE 20MEQ/15ML
40 LIQUID (ML) ORAL
Qty: 4500 ML | Refills: 0
Start: 2019-10-02 | End: 2020-04-23

## 2019-10-02 RX ORDER — POTASSIUM CHLORIDE 14.9 MG/ML
20 INJECTION INTRAVENOUS ONCE
Status: DISCONTINUED | OUTPATIENT
Start: 2019-10-02 | End: 2019-10-02

## 2019-10-02 RX ADMIN — POTASSIUM CHLORIDE 40 MEQ: 20 SOLUTION ORAL at 10:11

## 2019-10-02 RX ADMIN — POTASSIUM CHLORIDE 20 MEQ: 149 INJECTION, SOLUTION, CONCENTRATE INTRAVENOUS at 12:26

## 2019-10-02 RX ADMIN — Medication 100 MG: at 10:11

## 2019-10-02 RX ADMIN — POTASSIUM CHLORIDE 40 MEQ: 20 SOLUTION ORAL at 06:07

## 2019-10-02 RX ADMIN — AMILORIDE HYDROCLORIDE 5 MG: 5 TABLET ORAL at 10:12

## 2019-10-02 RX ADMIN — POTASSIUM CHLORIDE 20 MEQ: 149 INJECTION, SOLUTION, CONCENTRATE INTRAVENOUS at 10:18

## 2019-10-02 RX ADMIN — FERROUS SULFATE TAB 325 MG (65 MG ELEMENTAL FE) 325 MG: 325 (65 FE) TAB at 10:12

## 2019-10-02 RX ADMIN — ZINC SULFATE CAP 220 MG (50 MG ELEMENTAL ZN) 220 MG: 220 (50 ZN) CAP at 10:12

## 2019-10-02 RX ADMIN — TOPIRAMATE 100 MG: 25 TABLET, FILM COATED ORAL at 10:11

## 2019-10-02 NOTE — NURSING NOTE
Patient to receive 3 potassium riders  Patient states she "has no one to  kids from bus " She is unable to stay for 3rd bag  CRNP aware, okay to discharge after second potassium rider

## 2019-10-02 NOTE — DISCHARGE SUMMARY
Discharge- Raymon Palm 1985, 27 y o  female MRN: [id number]    Unit/Bed#: -02 Encounter: [id number]    Primary Care Provider: David Ruvalcaba DO   Date and time admitted to hospital: 9/30/2019 12:41 PM        * Hypokalemia  Assessment & Plan  · Patient with chronic hypokalemia with numbness and tingling on her lips and finger tips  Clinically is improving  · Will monitor on tele  · Continue potassium supplementation- will continue with 40 mEq 5 times daily at home on discharge  · Nephrology input appreciated  · Outpatient follow-up with Nephrology  The patient should be eligible be evaluated by Yaneth Chen [place] at diagnostic dilemma department to identify cause of her hypokalemia  Vitamin B12 deficiency  Assessment & Plan  · Continue supplementation      Migraine without aura and without status migrainosus, not intractable  Assessment & Plan  · Stable  · Continue home medications  · Monitor closely         Discharging Physician / Practitioner: Raul Arroyo [address] [place] [address]  PCP: David Ruvalcaba DO  Admission Date:   Admission Orders (From admission, onward)     Ordered        10/01/19 1213  Inpatient Admission  Once         09/30/19 1308  Place in Observation  Once                   Discharge Date: 10/02/19    Resolved Problems  Date Reviewed: 10/2/2019    None          Consultations During Hospital Stay:  · Nephrology    Procedures Performed:   · None    Significant Findings / Test Results:   · None    Incidental Findings:   · None     Test Results Pending at Discharge (will require follow up):   · none     Outpatient Tests Requested:  · Follow up with PCP   · Follow up with nephrology     Complications:     None     Reason for Admission: lab abnormality     Hospital Course: Raymon Palm is a 27 y o  female patient who originally presented to the hospital on 9/30/2019 due to hypokalemia  The patient with history of chronic hypokalemia with unknown causes    She has history gastric bypass surgery  Nephrology evaluation was done  The patient received both oral and IV potassium supplement while in the hospital   She presented with some paresthesia of upper and lower extremity as well as around her lips  The symptoms were resolved  On discharge patient will be back on her K 40 mEq 5 times daily  Recommend to follow up with Nephrology  Patient will be receiving IV K outpatient basis at [facility]  Please see above list of diagnoses and related plan for additional information  Condition at Discharge: good     Discharge Day Visit / Exam:     Subjective:  Feeling much better today  No numbness or tingling of her extremities  Want to go home  Vitals: Blood Pressure: 104/56 (10/02/19 0757)  Pulse: 76 (10/02/19 0757)  Temperature: (!) 97 1 °F (36 2 °C) (10/02/19 0757)  Temp Source: Temporal (10/02/19 0757)  Respirations: 18 (10/02/19 0757)  Height: 5' 3" (160 cm) (09/30/19 1242)  Weight - Scale: 72 kg (158 lb 11 7 oz) (10/02/19 0600)  SpO2: 99 % (10/02/19 0757)  Exam:   Physical Exam   Constitutional: She is oriented to person, place, and time  She appears well-developed and well-nourished  No distress  HENT:   Head: Normocephalic and atraumatic  Mouth/Throat: Oropharynx is clear and moist    Eyes: Pupils are equal, round, and reactive to light  Conjunctivae and EOM are normal    Neck: Normal range of motion  Neck supple  No thyromegaly present  Cardiovascular: Normal rate, regular rhythm and normal heart sounds  Exam reveals no gallop and no friction rub  No murmur heard  Pulmonary/Chest: Effort normal and breath sounds normal  She has no wheezes  She has no rales  Abdominal: Soft  Bowel sounds are normal  She exhibits no distension  There is no tenderness  There is no rebound  Musculoskeletal: Normal range of motion  She exhibits no edema, tenderness or deformity  Neurological: She is alert and oriented to person, place, and time   No cranial nerve deficit  Skin: Skin is warm and dry  No rash noted  No erythema  Psychiatric: She has a normal mood and affect  Her behavior is normal  Thought content normal    Vitals reviewed  Discussion with Family: n/a     Discharge instructions/Information to patient and family:   See after visit summary for information provided to patient and family  Provisions for Follow-Up Care:  See after visit summary for information related to follow-up care and any pertinent home health orders  Disposition:     Home    For Discharges to [facility] SNF:   · Not Applicable to this Patient - Not Applicable to this Patient    Planned Readmission:    No      Discharge Statement:  I spent 32 minutes discharging the patient  This time was spent on the day of discharge  I had direct contact with the patient on the day of discharge  Greater than 50% of the total time was spent examining patient, answering all patient questions, arranging and discussing plan of care with patient as well as directly providing post-discharge instructions  Additional time then spent on discharge activities  Discharge Medications:  See after visit summary for reconciled discharge medications provided to patient and family        ** Please Note: This note has been constructed using a voice recognition system **

## 2019-10-02 NOTE — SOCIAL WORK
Patient discharged to home  Will follow up with Dr Florencia Salazar and infusions at [facility]  Spouse will transport her home   Denies needing any other services

## 2019-10-02 NOTE — NURSING NOTE
Patient discharged to home  IV removed with catheter tip intact, DSD and pressure applied  All belongings returned to patient upon discharge  Patient verbalized understanding of discharge instructions

## 2019-10-02 NOTE — PLAN OF CARE
Problem: METABOLIC, FLUID AND ELECTROLYTES - ADULT  Goal: Electrolytes maintained within normal limits  Description  INTERVENTIONS:  - Monitor labs and assess patient for signs and symptoms of electrolyte imbalances  - Administer electrolyte replacement as ordered  - Monitor response to electrolyte replacements, including repeat lab results as appropriate  - Instruct patient on fluid and nutrition as appropriate  Outcome: Progressing     Problem: SAFETY ADULT  Goal: Patient will remain free of falls  Description  INTERVENTIONS:  - Assess patient frequently for physical needs  -  Identify cognitive and physical deficits and behaviors that affect risk of falls  -  [place] fall precautions as indicated by assessment   - Educate patient/family on patient safety including physical limitations  - Modify environment to reduce risk of injury  - Consider OT/PT consult to assist with strengthening/mobility   Outcome: Progressing     Problem: Knowledge Deficit  Goal: Patient/family/caregiver demonstrates understanding of disease process, treatment plan, medications, and discharge instructions  Description  Complete learning assessment and assess knowledge base  Interventions:  - Provide teaching at level of understanding  - Provide teaching via preferred learning methods  Outcome: Progressing     Problem: Potential for Falls  Goal: Patient will remain free of falls  Description  INTERVENTIONS:  - Assess patient frequently for physical needs  -  Identify cognitive and physical deficits and behaviors that affect risk of falls    -  [place] fall precautions as indicated by assessment   - Educate patient/family on patient safety including physical limitations  - Modify environment to reduce risk of injury  - Consider OT/PT consult to assist with strengthening/mobility   Outcome: Progressing     Problem: DISCHARGE PLANNING - CARE MANAGEMENT  Goal: Discharge to post-acute care or home with appropriate resources  Description  INTERVENTIONS:  - Conduct assessment to determine patient/family and health care team treatment goals, and need for post-acute services based on payer coverage, community resources, and patient preferences, and barriers to discharge  - Address psychosocial, clinical, and financial barriers to discharge as identified in assessment in conjunction with the patient/family and health care team  - Arrange appropriate level of post-acute services according to patient's   needs and preference and payer coverage in collaboration with the physician and health care team  - Communicate with and update the patient/family, physician, and health care team regarding progress on the discharge plan  - Arrange appropriate transportation to post-acute venues    Home with family, no services needed   Outcome: Progressing

## 2019-10-03 ENCOUNTER — HOSPITAL ENCOUNTER (OUTPATIENT)
Dept: INFUSION CENTER | Facility: HOSPITAL | Age: 34
Discharge: HOME | End: 2019-10-03
Attending: INTERNAL MEDICINE
Payer: COMMERCIAL

## 2019-10-03 VITALS
DIASTOLIC BLOOD PRESSURE: 54 MMHG | RESPIRATION RATE: 16 BRPM | HEART RATE: 81 BPM | SYSTOLIC BLOOD PRESSURE: 112 MMHG | OXYGEN SATURATION: 99 % | TEMPERATURE: 97.8 F

## 2019-10-03 DIAGNOSIS — E87.6 HYPOKALEMIA: Primary | ICD-10-CM

## 2019-10-03 LAB
ANION GAP SERPL CALCULATED.3IONS-SCNC: 14 MMOL/L (ref 4–13)
BUN SERPL-MCNC: 8 MG/DL (ref 5–25)
CALCIUM SERPL-MCNC: 8.1 MG/DL (ref 8.3–10.1)
CHLORIDE SERPL-SCNC: 106 MMOL/L (ref 100–108)
CO2 SERPL-SCNC: 16 MMOL/L (ref 21–32)
CREAT SERPL-MCNC: 1.09 MG/DL (ref 0.6–1.3)
GFR SERPL CREATININE-BSD FRML MDRD: 66 ML/MIN/1.73SQ M
GLUCOSE SERPL-MCNC: 92 MG/DL (ref 65–140)
POTASSIUM SERPL-SCNC: 3.9 MMOL/L (ref 3.5–5.3)
SODIUM SERPL-SCNC: 136 MMOL/L (ref 136–145)

## 2019-10-03 PROCEDURE — 80048 BASIC METABOLIC PNL TOTAL CA: CPT | Performed by: INTERNAL MEDICINE

## 2019-10-03 PROCEDURE — 96365 THER/PROPH/DIAG IV INF INIT: CPT

## 2019-10-03 PROCEDURE — 96366 THER/PROPH/DIAG IV INF ADDON: CPT

## 2019-10-03 RX ORDER — SODIUM CHLORIDE 9 MG/ML
20 INJECTION, SOLUTION INTRAVENOUS CONTINUOUS
Status: CANCELLED
Start: 2019-10-07

## 2019-10-03 RX ORDER — SODIUM CHLORIDE 9 MG/ML
20 INJECTION, SOLUTION INTRAVENOUS CONTINUOUS
Status: DISCONTINUED | OUTPATIENT
Start: 2019-10-03 | End: 2019-10-06 | Stop reason: HOSPADM

## 2019-10-03 RX ADMIN — POTASSIUM CHLORIDE: 149 INJECTION, SOLUTION, CONCENTRATE INTRAVENOUS at 09:30

## 2019-10-03 RX ADMIN — SODIUM CHLORIDE 20 ML/HR: 0.9 INJECTION, SOLUTION INTRAVENOUS at 08:45

## 2019-10-03 NOTE — PLAN OF CARE
Problem: INFECTION - ADULT  Goal: Absence or prevention of progression during hospitalization  Description  INTERVENTIONS:  - Assess and monitor for signs and symptoms of infection  - Monitor lab/diagnostic results  - Monitor all insertion sites, i e  indwelling lines, tubes, and drains  - Monitor endotracheal if appropriate and nasal secretions for changes in amount and color  - [place] appropriate cooling/warming therapies per order  - Administer medications as ordered  - Instruct and encourage patient and family to use good hand hygiene technique  - Identify and instruct in appropriate isolation precautions for identified infection/condition  Outcome: Progressing  Goal: Absence of fever/infection during neutropenic period  Description  INTERVENTIONS:  - Monitor WBC    Outcome: Progressing

## 2019-10-03 NOTE — PROGRESS NOTES
Patient tolerated infusion well  Stating she would like to be put on flush due her needing to leave for       Declined AVS

## 2019-10-03 NOTE — UTILIZATION REVIEW
Notification of Discharge  This is a Notification of Discharge from our facility [address]  Please be advised that this patient has been discharge from our facility  Below you will find the admission and discharge date and time including the patients disposition  PRESENTATION DATE: 9/30/2019 12:41 PM  OBS ADMISSION DATE:   IP ADMISSION DATE: 9/30/19 1241   DISCHARGE DATE: 10/2/2019  2:54 PM  DISPOSITION: Home/Self Care Home/Self Care   Admission Orders listed below:  Admission Orders (From admission, onward)     Ordered        10/01/19 1213  Inpatient Admission  Once         09/30/19 1308  Place in Observation  Once                   Please contact the UR Department if additional information is required to close this patient's authorization/case  [address]  Utilization Review Department  Phone: [phone]; Fax [phone]  [email or web address]  ATTENTION: Please call with any questions or concerns to [phone]  and carefully listen to the prompts so that you are directed to the right person  Send all requests for admission clinical reviews, approved or denied determinations and any other requests to fax [phone]   All voicemails are confidential

## 2019-10-03 NOTE — PROGRESS NOTES
1425 Patient tolerated hydration well  She is unable to stay for last hour of infusion due to getting children of the bus  She was discharged in stable condition   Dr Margarete Bernheim was made aware of same

## 2019-10-07 ENCOUNTER — HOSPITAL ENCOUNTER (OUTPATIENT)
Dept: INFUSION CENTER | Facility: HOSPITAL | Age: 34
Discharge: HOME | End: 2019-10-07
Payer: COMMERCIAL

## 2019-10-07 VITALS
SYSTOLIC BLOOD PRESSURE: 116 MMHG | OXYGEN SATURATION: 100 % | DIASTOLIC BLOOD PRESSURE: 58 MMHG | TEMPERATURE: 99.5 F | RESPIRATION RATE: 18 BRPM | HEART RATE: 89 BPM

## 2019-10-07 DIAGNOSIS — E87.6 HYPOKALEMIA: Primary | ICD-10-CM

## 2019-10-07 LAB
ANION GAP SERPL CALCULATED.3IONS-SCNC: 10 MMOL/L (ref 4–13)
BUN SERPL-MCNC: 10 MG/DL (ref 5–25)
CALCIUM SERPL-MCNC: 8.2 MG/DL (ref 8.3–10.1)
CHLORIDE SERPL-SCNC: 106 MMOL/L (ref 100–108)
CO2 SERPL-SCNC: 22 MMOL/L (ref 21–32)
CREAT SERPL-MCNC: 0.88 MG/DL (ref 0.6–1.3)
GFR SERPL CREATININE-BSD FRML MDRD: 85 ML/MIN/1.73SQ M
GLUCOSE SERPL-MCNC: 74 MG/DL (ref 65–140)
POTASSIUM SERPL-SCNC: 5.1 MMOL/L (ref 3.5–5.3)
SODIUM SERPL-SCNC: 138 MMOL/L (ref 136–145)

## 2019-10-07 PROCEDURE — 80048 BASIC METABOLIC PNL TOTAL CA: CPT | Performed by: INTERNAL MEDICINE

## 2019-10-07 RX ORDER — SODIUM CHLORIDE 9 MG/ML
20 INJECTION, SOLUTION INTRAVENOUS CONTINUOUS
Status: CANCELLED
Start: 2019-10-10

## 2019-10-07 RX ORDER — SODIUM CHLORIDE 9 MG/ML
20 INJECTION, SOLUTION INTRAVENOUS CONTINUOUS
Status: DISCONTINUED | OUTPATIENT
Start: 2019-10-07 | End: 2019-10-10 | Stop reason: HOSPADM

## 2019-10-07 RX ADMIN — SODIUM CHLORIDE 20 ML/HR: 0.9 INJECTION, SOLUTION INTRAVENOUS at 09:14

## 2019-10-07 NOTE — PLAN OF CARE
Problem: INFECTION - ADULT  Goal: Absence or prevention of progression during hospitalization  Description  INTERVENTIONS:  - Assess and monitor for signs and symptoms of infection  - Monitor lab/diagnostic results  - Monitor all insertion sites, i e  indwelling lines, tubes, and drains  - Monitor endotracheal if appropriate and nasal secretions for changes in amount and color  - [place] appropriate cooling/warming therapies per order  - Administer medications as ordered  - Instruct and encourage patient and family to use good hand hygiene technique  - Identify and instruct in appropriate isolation precautions for identified infection/condition  Outcome: Progressing  Goal: Absence of fever/infection during neutropenic period  Description  INTERVENTIONS:  - Monitor WBC    Outcome: Progressing

## 2019-10-10 ENCOUNTER — HOSPITAL ENCOUNTER (OUTPATIENT)
Dept: INFUSION CENTER | Facility: HOSPITAL | Age: 34
Discharge: HOME | End: 2019-10-10
Payer: COMMERCIAL

## 2019-10-10 VITALS
SYSTOLIC BLOOD PRESSURE: 104 MMHG | HEART RATE: 88 BPM | RESPIRATION RATE: 18 BRPM | DIASTOLIC BLOOD PRESSURE: 74 MMHG | TEMPERATURE: 97.6 F | OXYGEN SATURATION: 100 %

## 2019-10-10 DIAGNOSIS — E87.6 HYPOKALEMIA: Primary | ICD-10-CM

## 2019-10-10 LAB
ANION GAP SERPL CALCULATED.3IONS-SCNC: 7 MMOL/L (ref 4–13)
BUN SERPL-MCNC: 7 MG/DL (ref 5–25)
CALCIUM SERPL-MCNC: 8.6 MG/DL (ref 8.3–10.1)
CHLORIDE SERPL-SCNC: 109 MMOL/L (ref 100–108)
CO2 SERPL-SCNC: 21 MMOL/L (ref 21–32)
CREAT SERPL-MCNC: 1.05 MG/DL (ref 0.6–1.3)
GFR SERPL CREATININE-BSD FRML MDRD: 69 ML/MIN/1.73SQ M
GLUCOSE SERPL-MCNC: 68 MG/DL (ref 65–140)
POTASSIUM SERPL-SCNC: 6.3 MMOL/L (ref 3.5–5.3)
SODIUM SERPL-SCNC: 137 MMOL/L (ref 136–145)

## 2019-10-10 PROCEDURE — 80048 BASIC METABOLIC PNL TOTAL CA: CPT | Performed by: INTERNAL MEDICINE

## 2019-10-10 NOTE — PROGRESS NOTES
K 6 3 DR FOSTER MADE AWARE OF SAME  ORDERS FOR PT NO POTASSIUM TODAY  TAKE POTASSIUM  ORAL 2X Friday AND 2X DAILY UNTIL HE CALLS HER AFTER LAB WORK ON Saturday  PT AWARE OF SAME AND VERBALIZES UNDERSTANDING

## 2019-10-11 RX ORDER — SODIUM CHLORIDE 9 MG/ML
20 INJECTION, SOLUTION INTRAVENOUS CONTINUOUS
Status: CANCELLED
Start: 2019-10-14

## 2019-10-14 ENCOUNTER — HOSPITAL ENCOUNTER (OUTPATIENT)
Dept: INFUSION CENTER | Facility: HOSPITAL | Age: 34
End: 2019-10-14
Attending: INTERNAL MEDICINE

## 2019-10-17 ENCOUNTER — HOSPITAL ENCOUNTER (OUTPATIENT)
Dept: INFUSION CENTER | Facility: HOSPITAL | Age: 34
Discharge: HOME | End: 2019-10-17
Payer: COMMERCIAL

## 2019-10-17 VITALS
DIASTOLIC BLOOD PRESSURE: 66 MMHG | RESPIRATION RATE: 18 BRPM | HEART RATE: 98 BPM | SYSTOLIC BLOOD PRESSURE: 122 MMHG | TEMPERATURE: 99 F | OXYGEN SATURATION: 100 %

## 2019-10-17 DIAGNOSIS — E87.6 HYPOKALEMIA: Primary | ICD-10-CM

## 2019-10-17 LAB
ANION GAP SERPL CALCULATED.3IONS-SCNC: 11 MMOL/L (ref 4–13)
BUN SERPL-MCNC: 14 MG/DL (ref 5–25)
CALCIUM SERPL-MCNC: 8.5 MG/DL (ref 8.3–10.1)
CHLORIDE SERPL-SCNC: 108 MMOL/L (ref 100–108)
CO2 SERPL-SCNC: 20 MMOL/L (ref 21–32)
CREAT SERPL-MCNC: 1.09 MG/DL (ref 0.6–1.3)
GFR SERPL CREATININE-BSD FRML MDRD: 66 ML/MIN/1.73SQ M
GLUCOSE SERPL-MCNC: 72 MG/DL (ref 65–140)
POTASSIUM SERPL-SCNC: 4.2 MMOL/L (ref 3.5–5.3)
SODIUM SERPL-SCNC: 139 MMOL/L (ref 136–145)

## 2019-10-17 PROCEDURE — 80048 BASIC METABOLIC PNL TOTAL CA: CPT | Performed by: INTERNAL MEDICINE

## 2019-10-17 PROCEDURE — 96366 THER/PROPH/DIAG IV INF ADDON: CPT

## 2019-10-17 PROCEDURE — 96365 THER/PROPH/DIAG IV INF INIT: CPT

## 2019-10-17 RX ORDER — SODIUM CHLORIDE 9 MG/ML
20 INJECTION, SOLUTION INTRAVENOUS CONTINUOUS
Status: DISCONTINUED | OUTPATIENT
Start: 2019-10-17 | End: 2019-10-20 | Stop reason: HOSPADM

## 2019-10-17 RX ORDER — SODIUM CHLORIDE 9 MG/ML
20 INJECTION, SOLUTION INTRAVENOUS CONTINUOUS
Status: CANCELLED
Start: 2019-10-21

## 2019-10-17 RX ADMIN — SODIUM CHLORIDE 20 ML/HR: 0.9 INJECTION, SOLUTION INTRAVENOUS at 09:39

## 2019-10-17 RX ADMIN — POTASSIUM CHLORIDE: 149 INJECTION, SOLUTION, CONCENTRATE INTRAVENOUS at 09:58

## 2019-10-21 ENCOUNTER — HOSPITAL ENCOUNTER (OUTPATIENT)
Dept: INFUSION CENTER | Facility: HOSPITAL | Age: 34
Discharge: HOME | End: 2019-10-21
Payer: COMMERCIAL

## 2019-10-21 VITALS
SYSTOLIC BLOOD PRESSURE: 98 MMHG | DIASTOLIC BLOOD PRESSURE: 58 MMHG | TEMPERATURE: 98.2 F | HEART RATE: 83 BPM | RESPIRATION RATE: 16 BRPM

## 2019-10-21 DIAGNOSIS — E87.6 HYPOKALEMIA: Primary | ICD-10-CM

## 2019-10-21 LAB
ANION GAP SERPL CALCULATED.3IONS-SCNC: 11 MMOL/L (ref 4–13)
BUN SERPL-MCNC: 12 MG/DL (ref 5–25)
CALCIUM SERPL-MCNC: 8.4 MG/DL (ref 8.3–10.1)
CHLORIDE SERPL-SCNC: 104 MMOL/L (ref 100–108)
CO2 SERPL-SCNC: 22 MMOL/L (ref 21–32)
CREAT SERPL-MCNC: 1.09 MG/DL (ref 0.6–1.3)
GFR SERPL CREATININE-BSD FRML MDRD: 66 ML/MIN/1.73SQ M
GLUCOSE SERPL-MCNC: 81 MG/DL (ref 65–140)
POTASSIUM SERPL-SCNC: 3.8 MMOL/L (ref 3.5–5.3)
SODIUM SERPL-SCNC: 137 MMOL/L (ref 136–145)

## 2019-10-21 PROCEDURE — 80048 BASIC METABOLIC PNL TOTAL CA: CPT | Performed by: INTERNAL MEDICINE

## 2019-10-21 PROCEDURE — 96366 THER/PROPH/DIAG IV INF ADDON: CPT

## 2019-10-21 PROCEDURE — 96365 THER/PROPH/DIAG IV INF INIT: CPT

## 2019-10-21 RX ORDER — SODIUM CHLORIDE 9 MG/ML
20 INJECTION, SOLUTION INTRAVENOUS CONTINUOUS
Status: CANCELLED
Start: 2019-10-24

## 2019-10-21 RX ORDER — SODIUM CHLORIDE 9 MG/ML
20 INJECTION, SOLUTION INTRAVENOUS CONTINUOUS
Status: DISCONTINUED | OUTPATIENT
Start: 2019-10-21 | End: 2019-10-24 | Stop reason: HOSPADM

## 2019-10-21 RX ADMIN — SODIUM CHLORIDE 20 ML/HR: 0.9 INJECTION, SOLUTION INTRAVENOUS at 10:02

## 2019-10-21 RX ADMIN — POTASSIUM CHLORIDE: 149 INJECTION, SOLUTION, CONCENTRATE INTRAVENOUS at 10:51

## 2019-10-21 NOTE — PLAN OF CARE
Problem: INFECTION - ADULT  Goal: Absence or prevention of progression during hospitalization  Description  INTERVENTIONS:  - Assess and monitor for signs and symptoms of infection  - Monitor lab/diagnostic results  - Monitor all insertion sites, i e  indwelling lines, tubes, and drains  - Monitor endotracheal if appropriate and nasal secretions for changes in amount and color  - [place] appropriate cooling/warming therapies per order  - Administer medications as ordered  - Instruct and encourage patient and family to use good hand hygiene technique  - Identify and instruct in appropriate isolation precautions for identified infection/condition  Outcome: Progressing  Goal: Absence of fever/infection during neutropenic period  Description  INTERVENTIONS:  - Monitor WBC    Outcome: Progressing

## 2019-10-21 NOTE — PROGRESS NOTES
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

## 2019-10-24 ENCOUNTER — HOSPITAL ENCOUNTER (OUTPATIENT)
Dept: INFUSION CENTER | Facility: HOSPITAL | Age: 34
Discharge: HOME | End: 2019-10-24
Payer: COMMERCIAL

## 2019-10-24 VITALS
HEART RATE: 92 BPM | TEMPERATURE: 99.4 F | DIASTOLIC BLOOD PRESSURE: 58 MMHG | SYSTOLIC BLOOD PRESSURE: 116 MMHG | RESPIRATION RATE: 16 BRPM

## 2019-10-24 DIAGNOSIS — E87.6 HYPOKALEMIA: Primary | ICD-10-CM

## 2019-10-24 LAB
ALBUMIN SERPL BCP-MCNC: 3.8 G/DL (ref 3.5–5)
ALP SERPL-CCNC: 44 U/L (ref 46–116)
ALT SERPL W P-5'-P-CCNC: 23 U/L (ref 12–78)
ANION GAP SERPL CALCULATED.3IONS-SCNC: 10 MMOL/L (ref 4–13)
AST SERPL W P-5'-P-CCNC: 21 U/L (ref 5–45)
BILIRUB SERPL-MCNC: 0.4 MG/DL (ref 0.2–1)
BUN SERPL-MCNC: 15 MG/DL (ref 5–25)
CALCIUM SERPL-MCNC: 8.5 MG/DL (ref 8.3–10.1)
CHLORIDE SERPL-SCNC: 106 MMOL/L (ref 100–108)
CO2 SERPL-SCNC: 19 MMOL/L (ref 21–32)
CREAT SERPL-MCNC: 1.11 MG/DL (ref 0.6–1.3)
GFR SERPL CREATININE-BSD FRML MDRD: 64 ML/MIN/1.73SQ M
GLUCOSE SERPL-MCNC: 84 MG/DL (ref 65–140)
POTASSIUM SERPL-SCNC: 5.1 MMOL/L (ref 3.5–5.3)
PROT SERPL-MCNC: 7.5 G/DL (ref 6.4–8.2)
SODIUM SERPL-SCNC: 135 MMOL/L (ref 136–145)

## 2019-10-24 PROCEDURE — 80053 COMPREHEN METABOLIC PANEL: CPT | Performed by: INTERNAL MEDICINE

## 2019-10-24 RX ORDER — SODIUM CHLORIDE 9 MG/ML
20 INJECTION, SOLUTION INTRAVENOUS CONTINUOUS
Status: CANCELLED
Start: 2019-10-29

## 2019-10-24 RX ORDER — SODIUM CHLORIDE 9 MG/ML
20 INJECTION, SOLUTION INTRAVENOUS CONTINUOUS
Status: DISCONTINUED | OUTPATIENT
Start: 2019-10-24 | End: 2019-10-27 | Stop reason: HOSPADM

## 2019-10-24 NOTE — PLAN OF CARE
Problem: INFECTION - ADULT  Goal: Absence or prevention of progression during hospitalization  Description  INTERVENTIONS:  - Assess and monitor for signs and symptoms of infection  - Monitor lab/diagnostic results  - Monitor all insertion sites  - Instruct and encourage patient and family to use good hand hygiene technique     Outcome: Progressing  Goal: Absence of fever/infection during neutropenic period  Description  INTERVENTIONS:  - Monitor WBC    Outcome: Progressing

## 2019-10-24 NOTE — PROGRESS NOTES
Potassium 5 1 double checked by Judith Rebolledo rn per order patient does not need infusion she was de accessed and d/c from unit in stable condition

## 2019-10-29 ENCOUNTER — HOSPITAL ENCOUNTER (OUTPATIENT)
Dept: INFUSION CENTER | Facility: HOSPITAL | Age: 34
Discharge: HOME | End: 2019-10-29
Attending: INTERNAL MEDICINE

## 2019-10-31 ENCOUNTER — HOSPITAL ENCOUNTER (OUTPATIENT)
Dept: INFUSION CENTER | Facility: HOSPITAL | Age: 34
Discharge: HOME | End: 2019-10-31
Payer: COMMERCIAL

## 2019-10-31 VITALS
RESPIRATION RATE: 18 BRPM | OXYGEN SATURATION: 100 % | DIASTOLIC BLOOD PRESSURE: 55 MMHG | TEMPERATURE: 98.7 F | HEART RATE: 87 BPM | SYSTOLIC BLOOD PRESSURE: 114 MMHG

## 2019-10-31 DIAGNOSIS — E87.6 HYPOKALEMIA: Primary | ICD-10-CM

## 2019-10-31 LAB
ANION GAP SERPL CALCULATED.3IONS-SCNC: 10 MMOL/L (ref 4–13)
BUN SERPL-MCNC: 14 MG/DL (ref 5–25)
CALCIUM SERPL-MCNC: 8.4 MG/DL (ref 8.3–10.1)
CHLORIDE SERPL-SCNC: 107 MMOL/L (ref 100–108)
CO2 SERPL-SCNC: 20 MMOL/L (ref 21–32)
CREAT SERPL-MCNC: 1.05 MG/DL (ref 0.6–1.3)
GFR SERPL CREATININE-BSD FRML MDRD: 69 ML/MIN/1.73SQ M
GLUCOSE SERPL-MCNC: 65 MG/DL (ref 65–140)
POTASSIUM SERPL-SCNC: 3.7 MMOL/L (ref 3.5–5.3)
SODIUM SERPL-SCNC: 137 MMOL/L (ref 136–145)

## 2019-10-31 PROCEDURE — 96366 THER/PROPH/DIAG IV INF ADDON: CPT

## 2019-10-31 PROCEDURE — 96365 THER/PROPH/DIAG IV INF INIT: CPT

## 2019-10-31 PROCEDURE — 80048 BASIC METABOLIC PNL TOTAL CA: CPT | Performed by: INTERNAL MEDICINE

## 2019-10-31 RX ORDER — SODIUM CHLORIDE 9 MG/ML
20 INJECTION, SOLUTION INTRAVENOUS CONTINUOUS
Status: CANCELLED
Start: 2019-11-04

## 2019-10-31 RX ORDER — SODIUM CHLORIDE 9 MG/ML
20 INJECTION, SOLUTION INTRAVENOUS CONTINUOUS
Status: DISCONTINUED | OUTPATIENT
Start: 2019-10-31 | End: 2019-11-03 | Stop reason: HOSPADM

## 2019-10-31 RX ADMIN — POTASSIUM CHLORIDE: 2 INJECTION, SOLUTION, CONCENTRATE INTRAVENOUS at 10:03

## 2019-10-31 NOTE — PROGRESS NOTES
1428 Patient tolerated infusion well  She is not able to complete total amount due to  issues   She was discharged in stable condition

## 2019-10-31 NOTE — PLAN OF CARE
Problem: INFECTION - ADULT  Goal: Absence or prevention of progression during hospitalization  Description  INTERVENTIONS:  - Assess and monitor for signs and symptoms of infection  - Monitor lab/diagnostic results  - Monitor all insertion sites, i e  indwelling lines, tubes, and drains  - Monitor endotracheal if appropriate and nasal secretions for changes in amount and color  - [place] appropriate cooling/warming therapies per order  - Administer medications as ordered  - Instruct and encourage patient and family to use good hand hygiene technique  - Identify and instruct in appropriate isolation precautions for identified infection/condition  Outcome: Progressing     Problem: Knowledge Deficit  Goal: Patient/family/caregiver demonstrates understanding of disease process, treatment plan, medications, and discharge instructions  Description  Complete learning assessment and assess knowledge base    Interventions:  - Provide teaching at level of understanding  - Provide teaching via preferred learning methods  Outcome: Progressing

## 2019-11-05 ENCOUNTER — HOSPITAL ENCOUNTER (OUTPATIENT)
Dept: INFUSION CENTER | Facility: HOSPITAL | Age: 34
Discharge: HOME | DRG: 641 | End: 2019-11-05
Attending: INTERNAL MEDICINE
Payer: COMMERCIAL

## 2019-11-05 ENCOUNTER — TELEPHONE (OUTPATIENT)
Dept: INFUSION CENTER | Facility: HOSPITAL | Age: 34
End: 2019-11-05

## 2019-11-05 ENCOUNTER — HOSPITAL ENCOUNTER (INPATIENT)
Facility: HOSPITAL | Age: 34
LOS: 1 days | Discharge: HOME | DRG: 641 | End: 2019-11-06
Attending: EMERGENCY MEDICINE | Admitting: FAMILY MEDICINE
Payer: COMMERCIAL

## 2019-11-05 DIAGNOSIS — R55 NEAR SYNCOPE: ICD-10-CM

## 2019-11-05 DIAGNOSIS — E87.6 HYPOKALEMIA: ICD-10-CM

## 2019-11-05 DIAGNOSIS — R53.83 LETHARGY: Primary | ICD-10-CM

## 2019-11-05 LAB
ALBUMIN SERPL BCP-MCNC: 4 G/DL (ref 3.5–5)
ALP SERPL-CCNC: 52 U/L (ref 46–116)
ALT SERPL W P-5'-P-CCNC: 44 U/L (ref 12–78)
ANION GAP SERPL CALCULATED.3IONS-SCNC: 11 MMOL/L (ref 4–13)
ANION GAP SERPL CALCULATED.3IONS-SCNC: 11 MMOL/L (ref 4–13)
ANION GAP SERPL CALCULATED.3IONS-SCNC: 15 MMOL/L (ref 4–13)
APTT PPP: 27 SECONDS (ref 23–37)
AST SERPL W P-5'-P-CCNC: 22 U/L (ref 5–45)
ATRIAL RATE: 93 BPM
BASOPHILS # BLD AUTO: 0.07 THOUSANDS/ΜL (ref 0–0.1)
BASOPHILS NFR BLD AUTO: 1 % (ref 0–1)
BILIRUB SERPL-MCNC: 0.4 MG/DL (ref 0.2–1)
BILIRUB UR QL STRIP: NEGATIVE
BUN SERPL-MCNC: 12 MG/DL (ref 5–25)
BUN SERPL-MCNC: 13 MG/DL (ref 5–25)
BUN SERPL-MCNC: 19 MG/DL (ref 5–25)
CALCIUM SERPL-MCNC: 7.9 MG/DL (ref 8.3–10.1)
CALCIUM SERPL-MCNC: 8 MG/DL (ref 8.3–10.1)
CALCIUM SERPL-MCNC: 8.7 MG/DL (ref 8.3–10.1)
CHLORIDE SERPL-SCNC: 101 MMOL/L (ref 100–108)
CHLORIDE SERPL-SCNC: 104 MMOL/L (ref 100–108)
CHLORIDE SERPL-SCNC: 105 MMOL/L (ref 100–108)
CLARITY UR: CLEAR
CO2 SERPL-SCNC: 22 MMOL/L (ref 21–32)
CO2 SERPL-SCNC: 22 MMOL/L (ref 21–32)
CO2 SERPL-SCNC: 23 MMOL/L (ref 21–32)
COLOR UR: YELLOW
CREAT SERPL-MCNC: 0.91 MG/DL (ref 0.6–1.3)
CREAT SERPL-MCNC: 1.06 MG/DL (ref 0.6–1.3)
CREAT SERPL-MCNC: 1.09 MG/DL (ref 0.6–1.3)
EOSINOPHIL # BLD AUTO: 0.11 THOUSAND/ΜL (ref 0–0.61)
EOSINOPHIL NFR BLD AUTO: 1 % (ref 0–6)
ERYTHROCYTE [DISTWIDTH] IN BLOOD BY AUTOMATED COUNT: 13.7 % (ref 11.6–15.1)
GFR SERPL CREATININE-BSD FRML MDRD: 66 ML/MIN/1.73SQ M
GFR SERPL CREATININE-BSD FRML MDRD: 68 ML/MIN/1.73SQ M
GFR SERPL CREATININE-BSD FRML MDRD: 82 ML/MIN/1.73SQ M
GLUCOSE SERPL-MCNC: 76 MG/DL (ref 65–140)
GLUCOSE SERPL-MCNC: 94 MG/DL (ref 65–140)
GLUCOSE SERPL-MCNC: 97 MG/DL (ref 65–140)
GLUCOSE UR STRIP-MCNC: NEGATIVE MG/DL
HCT VFR BLD AUTO: 44.3 % (ref 34.8–46.1)
HGB BLD-MCNC: 15.1 G/DL (ref 11.5–15.4)
HGB UR QL STRIP.AUTO: NEGATIVE
IMM GRANULOCYTES # BLD AUTO: 0.02 THOUSAND/UL (ref 0–0.2)
IMM GRANULOCYTES NFR BLD AUTO: 0 % (ref 0–2)
INR PPP: 0.96 (ref 0.84–1.19)
KETONES UR STRIP-MCNC: ABNORMAL MG/DL
LACTATE SERPL-SCNC: 0.8 MMOL/L (ref 0.5–2)
LEUKOCYTE ESTERASE UR QL STRIP: NEGATIVE
LYMPHOCYTES # BLD AUTO: 1.81 THOUSANDS/ΜL (ref 0.6–4.47)
LYMPHOCYTES NFR BLD AUTO: 23 % (ref 14–44)
MAGNESIUM SERPL-MCNC: 2.5 MG/DL (ref 1.6–2.6)
MCH RBC QN AUTO: 30.5 PG (ref 26.8–34.3)
MCHC RBC AUTO-ENTMCNC: 34.1 G/DL (ref 31.4–37.4)
MCV RBC AUTO: 90 FL (ref 82–98)
MONOCYTES # BLD AUTO: 0.79 THOUSAND/ΜL (ref 0.17–1.22)
MONOCYTES NFR BLD AUTO: 10 % (ref 4–12)
NEUTROPHILS # BLD AUTO: 5.15 THOUSANDS/ΜL (ref 1.85–7.62)
NEUTS SEG NFR BLD AUTO: 65 % (ref 43–75)
NITRITE UR QL STRIP: NEGATIVE
NRBC BLD AUTO-RTO: 0 /100 WBCS
P AXIS: 80 DEGREES
PH UR STRIP.AUTO: 6.5 [PH]
PHOSPHATE SERPL-MCNC: 4.5 MG/DL (ref 2.7–4.5)
PLATELET # BLD AUTO: 279 THOUSANDS/UL (ref 149–390)
PLATELET # BLD AUTO: 339 THOUSANDS/UL (ref 149–390)
PMV BLD AUTO: 10.3 FL (ref 8.9–12.7)
PMV BLD AUTO: 10.4 FL (ref 8.9–12.7)
POTASSIUM SERPL-SCNC: 1.8 MMOL/L (ref 3.5–5.3)
POTASSIUM SERPL-SCNC: 2.5 MMOL/L (ref 3.5–5.3)
POTASSIUM SERPL-SCNC: 2.7 MMOL/L (ref 3.5–5.3)
PR INTERVAL: 136 MS
PROT SERPL-MCNC: 7.9 G/DL (ref 6.4–8.2)
PROT UR STRIP-MCNC: NEGATIVE MG/DL
PROTHROMBIN TIME: 12.8 SECONDS (ref 11.6–14.5)
QRS AXIS: 38 DEGREES
QRSD INTERVAL: 104 MS
QT INTERVAL: 484 MS
QTC INTERVAL: 601 MS
RBC # BLD AUTO: 4.95 MILLION/UL (ref 3.81–5.12)
SODIUM SERPL-SCNC: 137 MMOL/L (ref 136–145)
SODIUM SERPL-SCNC: 138 MMOL/L (ref 136–145)
SODIUM SERPL-SCNC: 139 MMOL/L (ref 136–145)
SP GR UR STRIP.AUTO: 1.02 (ref 1–1.03)
T WAVE AXIS: 69 DEGREES
UROBILINOGEN UR QL STRIP.AUTO: 0.2 E.U./DL
VENTRICULAR RATE: 93 BPM
WBC # BLD AUTO: 7.95 THOUSAND/UL (ref 4.31–10.16)

## 2019-11-05 PROCEDURE — 96365 THER/PROPH/DIAG IV INF INIT: CPT

## 2019-11-05 PROCEDURE — 83735 ASSAY OF MAGNESIUM: CPT | Performed by: EMERGENCY MEDICINE

## 2019-11-05 PROCEDURE — 84100 ASSAY OF PHOSPHORUS: CPT | Performed by: EMERGENCY MEDICINE

## 2019-11-05 PROCEDURE — 93005 ELECTROCARDIOGRAM TRACING: CPT

## 2019-11-05 PROCEDURE — 80053 COMPREHEN METABOLIC PANEL: CPT | Performed by: EMERGENCY MEDICINE

## 2019-11-05 PROCEDURE — 93010 ELECTROCARDIOGRAM REPORT: CPT | Performed by: INTERNAL MEDICINE

## 2019-11-05 PROCEDURE — 36415 COLL VENOUS BLD VENIPUNCTURE: CPT | Performed by: EMERGENCY MEDICINE

## 2019-11-05 PROCEDURE — 99285 EMERGENCY DEPT VISIT HI MDM: CPT | Performed by: EMERGENCY MEDICINE

## 2019-11-05 PROCEDURE — 99222 1ST HOSP IP/OBS MODERATE 55: CPT | Performed by: FAMILY MEDICINE

## 2019-11-05 PROCEDURE — 87040 BLOOD CULTURE FOR BACTERIA: CPT | Performed by: EMERGENCY MEDICINE

## 2019-11-05 PROCEDURE — 85049 AUTOMATED PLATELET COUNT: CPT | Performed by: FAMILY MEDICINE

## 2019-11-05 PROCEDURE — 81003 URINALYSIS AUTO W/O SCOPE: CPT | Performed by: EMERGENCY MEDICINE

## 2019-11-05 PROCEDURE — 80048 BASIC METABOLIC PNL TOTAL CA: CPT | Performed by: FAMILY MEDICINE

## 2019-11-05 PROCEDURE — 85730 THROMBOPLASTIN TIME PARTIAL: CPT | Performed by: EMERGENCY MEDICINE

## 2019-11-05 PROCEDURE — 85610 PROTHROMBIN TIME: CPT | Performed by: EMERGENCY MEDICINE

## 2019-11-05 PROCEDURE — 85025 COMPLETE CBC W/AUTO DIFF WBC: CPT | Performed by: EMERGENCY MEDICINE

## 2019-11-05 PROCEDURE — 83605 ASSAY OF LACTIC ACID: CPT | Performed by: EMERGENCY MEDICINE

## 2019-11-05 PROCEDURE — 96361 HYDRATE IV INFUSION ADD-ON: CPT

## 2019-11-05 PROCEDURE — 99285 EMERGENCY DEPT VISIT HI MDM: CPT

## 2019-11-05 RX ORDER — SUMATRIPTAN 25 MG/1
100 TABLET, FILM COATED ORAL DAILY PRN
Status: DISCONTINUED | OUTPATIENT
Start: 2019-11-05 | End: 2019-11-06 | Stop reason: HOSPADM

## 2019-11-05 RX ORDER — ZINC SULFATE 50(220)MG
220 CAPSULE ORAL DAILY
Status: DISCONTINUED | OUTPATIENT
Start: 2019-11-06 | End: 2019-11-06 | Stop reason: HOSPADM

## 2019-11-05 RX ORDER — POTASSIUM CHLORIDE 14.9 MG/ML
20 INJECTION INTRAVENOUS ONCE
Status: COMPLETED | OUTPATIENT
Start: 2019-11-05 | End: 2019-11-05

## 2019-11-05 RX ORDER — POTASSIUM CHLORIDE 20MEQ/15ML
40 LIQUID (ML) ORAL
Status: DISPENSED | OUTPATIENT
Start: 2019-11-05 | End: 2019-11-05

## 2019-11-05 RX ORDER — TOPIRAMATE 100 MG/1
100 TABLET, FILM COATED ORAL 2 TIMES DAILY
Status: DISCONTINUED | OUTPATIENT
Start: 2019-11-05 | End: 2019-11-06 | Stop reason: HOSPADM

## 2019-11-05 RX ORDER — AMITRIPTYLINE HYDROCHLORIDE 50 MG/1
50 TABLET, FILM COATED ORAL
Status: DISCONTINUED | OUTPATIENT
Start: 2019-11-05 | End: 2019-11-06 | Stop reason: HOSPADM

## 2019-11-05 RX ORDER — POTASSIUM CHLORIDE 20 MEQ/1
40 TABLET, EXTENDED RELEASE ORAL ONCE
Status: COMPLETED | OUTPATIENT
Start: 2019-11-05 | End: 2019-11-05

## 2019-11-05 RX ORDER — SODIUM CHLORIDE 9 MG/ML
125 INJECTION, SOLUTION INTRAVENOUS CONTINUOUS
Status: DISCONTINUED | OUTPATIENT
Start: 2019-11-05 | End: 2019-11-05

## 2019-11-05 RX ORDER — POTASSIUM CHLORIDE 14.9 MG/ML
20 INJECTION INTRAVENOUS
Status: COMPLETED | OUTPATIENT
Start: 2019-11-05 | End: 2019-11-06

## 2019-11-05 RX ORDER — POTASSIUM CHLORIDE 20MEQ/15ML
20 LIQUID (ML) ORAL
Status: COMPLETED | OUTPATIENT
Start: 2019-11-05 | End: 2019-11-05

## 2019-11-05 RX ORDER — POTASSIUM CHLORIDE AND SODIUM CHLORIDE 900; 300 MG/100ML; MG/100ML
100 INJECTION, SOLUTION INTRAVENOUS CONTINUOUS
Status: DISCONTINUED | OUTPATIENT
Start: 2019-11-05 | End: 2019-11-06

## 2019-11-05 RX ORDER — POTASSIUM CHLORIDE 14.9 MG/ML
20 INJECTION INTRAVENOUS
Status: COMPLETED | OUTPATIENT
Start: 2019-11-05 | End: 2019-11-05

## 2019-11-05 RX ORDER — AMILORIDE HYDROCHLORIDE 5 MG/1
5 TABLET ORAL DAILY
Status: DISCONTINUED | OUTPATIENT
Start: 2019-11-06 | End: 2019-11-06 | Stop reason: HOSPADM

## 2019-11-05 RX ADMIN — POTASSIUM CHLORIDE 40 MEQ: 1500 TABLET, EXTENDED RELEASE ORAL at 09:45

## 2019-11-05 RX ADMIN — AMITRIPTYLINE HYDROCHLORIDE 50 MG: 50 TABLET, FILM COATED ORAL at 21:32

## 2019-11-05 RX ADMIN — POTASSIUM CHLORIDE 20 MEQ: 14.9 INJECTION, SOLUTION INTRAVENOUS at 18:42

## 2019-11-05 RX ADMIN — POTASSIUM CHLORIDE 20 MEQ: 14.9 INJECTION, SOLUTION INTRAVENOUS at 10:59

## 2019-11-05 RX ADMIN — POTASSIUM CHLORIDE AND SODIUM CHLORIDE 100 ML/HR: 900; 300 INJECTION, SOLUTION INTRAVENOUS at 21:40

## 2019-11-05 RX ADMIN — POTASSIUM CHLORIDE 20 MEQ: 20 SOLUTION ORAL at 18:42

## 2019-11-05 RX ADMIN — POTASSIUM CHLORIDE AND SODIUM CHLORIDE 100 ML/HR: 900; 300 INJECTION, SOLUTION INTRAVENOUS at 11:14

## 2019-11-05 RX ADMIN — POTASSIUM CHLORIDE 40 MEQ: 20 SOLUTION ORAL at 10:59

## 2019-11-05 RX ADMIN — POTASSIUM CHLORIDE 20 MEQ: 14.9 INJECTION, SOLUTION INTRAVENOUS at 16:07

## 2019-11-05 RX ADMIN — POTASSIUM CHLORIDE 20 MEQ: 20 SOLUTION ORAL at 16:07

## 2019-11-05 RX ADMIN — POTASSIUM CHLORIDE 40 MEQ: 20 SOLUTION ORAL at 13:23

## 2019-11-05 RX ADMIN — POTASSIUM CHLORIDE 20 MEQ: 14.9 INJECTION, SOLUTION INTRAVENOUS at 21:33

## 2019-11-05 RX ADMIN — POTASSIUM CHLORIDE 20 MEQ: 14.9 INJECTION, SOLUTION INTRAVENOUS at 13:25

## 2019-11-05 RX ADMIN — POTASSIUM CHLORIDE 20 MEQ: 14.9 INJECTION, SOLUTION INTRAVENOUS at 09:36

## 2019-11-05 RX ADMIN — SODIUM CHLORIDE 1000 ML: 0.9 INJECTION, SOLUTION INTRAVENOUS at 08:50

## 2019-11-05 RX ADMIN — POTASSIUM CHLORIDE 20 MEQ: 14.9 INJECTION, SOLUTION INTRAVENOUS at 23:23

## 2019-11-05 RX ADMIN — TOPIRAMATE 100 MG: 100 TABLET, FILM COATED ORAL at 18:42

## 2019-11-05 NOTE — ASSESSMENT & PLAN NOTE
Patient with recurrent admissions for hypokalemia  Case discussed with nephrology  Will aggressively replete K and check BMP @ 1300  Tele monitoring

## 2019-11-05 NOTE — H&P
H&P Exam - Leesa Butcher 27 y o  female MRN: [id number]    Unit/Bed#: TR05 Encounter: [id number]      Hypokalemia  Assessment & Plan  Patient with recurrent admissions for hypokalemia  Case discussed with nephrology  Will aggressively replete K and check BMP @ 1300 and 1800   Tele monitoring     GERD (gastroesophageal reflux disease)  Assessment & Plan  PPI therapy        History of Present Illness      Patient is a pleasant 27 Year old female with recurrent hypokalemia of unknown etiology, has undergone works up at [email or web address] and was recommended to gain weight  Followed by Dr Zoya Arias in the outpatient setting and gets frequent outpatient KCL infusion  Presents today with generalized weakness and found to have a K of 1 8  She states she had a decreased appetite over the past 4 days and has lost 10 lbs  She denies any vomiting or diarrhea     Review of Systems   Neurological: Positive for weakness and numbness  All other systems reviewed and are negative  Historical Information   Past Medical History:   Diagnosis Date    H  pylori infection     Hypokalemia     Migraine     Rhabdomyolysis      Past Surgical History:   Procedure Laterality Date    ABDOMINAL SURGERY      APPENDECTOMY      CHOLECYSTECTOMY      COSMETIC SURGERY      FL GUIDED CENTRAL VENOUS ACCESS DEVICE INSERTION  12/21/2018    GASTRIC BYPASS      HYSTERECTOMY      TUNNELED VENOUS PORT PLACEMENT N/A 12/21/2018    Procedure: INSERTION VENOUS PORT (PORT-A-CATH);   Surgeon: Adrien Duff DO;  Location: [facility];  Service: General     Social History   Social History     Substance and Sexual Activity   Alcohol Use Never    Alcohol/week: 0 0 standard drinks    Frequency: Never     Social History     Substance and Sexual Activity   Drug Use No     Social History     Tobacco Use   Smoking Status Never Smoker   Smokeless Tobacco Never Used     Family History: non-contributory    Meds/Allergies   all medications and allergies reviewed  Allergies   Allergen Reactions    Nsaids Other (See Comments)     Other reaction(s): Other (Please comment)  Should not take s/p bariatric surgery  Other reaction(s): Other (See Comments)  Should not take as per prior records  Should not take s/p bariatric surgery  Has hx of gastric bypass      Prednisone      Nausea, vomiting, diarrhea       Objective   First Vitals:   Blood Pressure: 128/64 (11/05/19 0821)  Pulse: (!) 108 (11/05/19 0821)  Temperature: 98 2 °F (36 8 °C) (11/05/19 0821)  Temp Source: Temporal (11/05/19 0821)  Respirations: 18 (11/05/19 0821)  Weight - Scale: 72 kg (158 lb 11 7 oz) (11/05/19 0821)  SpO2: 99 % (11/05/19 0821)    Current Vitals:   Blood Pressure: (!) 125/101 (11/05/19 1130)  Pulse: 98 (11/05/19 1130)  Temperature: 98 2 °F (36 8 °C) (11/05/19 0821)  Temp Source: Temporal (11/05/19 2934)  Respirations: 20 (11/05/19 1130)  Weight - Scale: 72 kg (158 lb 11 7 oz) (11/05/19 0821)  SpO2: 100 % (11/05/19 1130)      Intake/Output Summary (Last 24 hours) at 11/5/2019 1218  Last data filed at 11/5/2019 1034  Gross per 24 hour   Intake 1050 ml   Output    Net 1050 ml       Invasive Devices     Central Venous Catheter Line            Port A Cath 12/22/18 Right Chest 318 days          Peripheral Intravenous Line            Peripheral IV 11/05/19 Right Hand less than 1 day                Physical Exam   Constitutional: She appears well-developed  HENT:   Head: Normocephalic and atraumatic  Mouth/Throat: No oropharyngeal exudate  Eyes: No scleral icterus  Neck: Normal range of motion  No JVD present  Cardiovascular: Normal rate and regular rhythm  Pulmonary/Chest: Effort normal and breath sounds normal  No stridor  No respiratory distress  She has no wheezes  Abdominal: Soft  Bowel sounds are normal  She exhibits no mass  There is no tenderness  There is no guarding  Musculoskeletal: Normal range of motion  She exhibits no edema  Neurological: She is alert   She displays normal reflexes  No cranial nerve deficit  Coordination normal    Skin: Skin is warm  She is not diaphoretic  No erythema  Psychiatric: She has a normal mood and affect         Lab Results:   Lab Results   Component Value Date    SODIUM 138 11/05/2019    K 1 8 (LL) 11/05/2019     11/05/2019    CO2 22 11/05/2019    BUN 19 11/05/2019    CREATININE 1 06 11/05/2019    GLUC 94 11/05/2019    CALCIUM 8 7 11/05/2019       Imaging:   No orders to display       EKG, Pathology, and Other Studies:   Sinus     Code Status: Level 1 - Full Code  Advance Directive and Living Will:      Power of :    POLST:      Counseling / Coordination of Care:

## 2019-11-05 NOTE — PLAN OF CARE
Problem: METABOLIC, FLUID AND ELECTROLYTES - ADULT  Goal: Electrolytes maintained within normal limits  Description  INTERVENTIONS:  - Monitor labs and assess patient for signs and symptoms of electrolyte imbalances  - Administer electrolyte replacement as ordered  - Monitor response to electrolyte replacements, including repeat lab results as appropriate  - Instruct patient on fluid and nutrition as appropriate  Outcome: Progressing     Problem: HEMATOLOGIC - ADULT  Goal: Maintains hematologic stability  Description  INTERVENTIONS  - Assess for signs and symptoms of bleeding or hemorrhage  - Monitor labs  - Administer supportive blood products/factors as ordered and appropriate  Outcome: Progressing

## 2019-11-05 NOTE — ED PROVIDER NOTES
History  Chief Complaint   Patient presents with    Lethargy     patient brought down from infusion  patient very lethargic      Pt was brought to [facility] from [facility] infusion center with hx that pt was to receive Potassium infusion and presented weak/lethargic and near syncopal  No VS were done and pt brought directly here  Pt has hx of ongoing Hypokalemia and infusions since July 2018  Pt relates "no known reason"  Pt states last few days was been feeling weak and decreased appetite  Since yesterday numb feeling of hands/feet /face , which she relates happens with low Potassium  She also relates losing about 9 lbs in last 5-6 days   Pt did eat and take meds this AM  No N/V/D  No CP or SOB  No Abd pain  Pt had temp yesterday 101 and states son had a "virus" last week  On arrival pt lethargic , but able to stand and transfer to litter  Is A/O and no slurred speech  VS  /64      History provided by:  Patient  Malaise - 7 years or greater   Progression:  Worsening  Chronicity:  Recurrent  Context: stress    Context: not alcohol use, not change in medication, not dehydration and not drug use    Ineffective treatments:  None tried  Associated symptoms: dizziness, lethargy and near-syncope    Associated symptoms: no abdominal pain, no aphasia, no ataxia, no chest pain, no cough, no diarrhea, no drooling, no dysphagia, no dysuria, no foul-smelling urine, no frequency, no headaches, no hematochezia, no loss of consciousness, no melena, no seizures, no shortness of breath, no stroke symptoms, no syncope and no vomiting    Risk factors: no coronary artery disease, no diabetes, no heart disease and no new medications        Prior to Admission Medications   Prescriptions Last Dose Informant Patient Reported? Taking?    AMILoride 5 mg tablet  Self No No   Sig: Take 1 tablet (5 mg total) by mouth daily   SUMAtriptan (IMITREX) 100 mg tablet   Yes No   Sig: Take 100 mg by mouth daily at bedtime    amitriptyline (ELAVIL) 50 mg tablet   Yes No   Sig: Take 50 mg by mouth daily at bedtime   b complex-C-folic acid (NEPHRO-EDIE) 0 8 mg tablet   Yes No   Sig: Take 0 8 mg by mouth daily with dinner   ergocalciferol (ERGOCALCIFEROL) 50092 units capsule   Yes No   Sig: Take 50,000 Units by mouth once a week Patient takes this med on Mondays    ferrous sulfate 325 (65 Fe) mg tablet   Yes No   Sig: Take 325 mg by mouth every other day    meclizine (ANTIVERT) 25 mg tablet   Yes No   Sig: Take 25 mg by mouth daily at bedtime   potassium chloride 10 % oral solution   No No   Sig: Take 30 mL (40 mEq total) by mouth 5 (five) times a day   thiamine 100 MG tablet   Yes No   Sig: Take 100 mg by mouth daily   topiramate (TOPAMAX) 100 mg tablet   Yes No   Sig: Take 100 mg by mouth 2 (two) times a day   zinc sulfate (ZINCATE) 220 mg capsule   No No   Sig: Take 1 capsule (220 mg total) by mouth daily      Facility-Administered Medications: None       Past Medical History:   Diagnosis Date    H  pylori infection     Hypokalemia     Migraine     Rhabdomyolysis        Past Surgical History:   Procedure Laterality Date    ABDOMINAL SURGERY      APPENDECTOMY      CHOLECYSTECTOMY      COSMETIC SURGERY      FL GUIDED CENTRAL VENOUS ACCESS DEVICE INSERTION  12/21/2018    GASTRIC BYPASS      HYSTERECTOMY      TUNNELED VENOUS PORT PLACEMENT N/A 12/21/2018    Procedure: INSERTION VENOUS PORT (PORT-A-CATH); Surgeon: Sharlene Cole DO;  Location: [facility];  Service: General       Family History   Problem Relation Age of Onset    Hypertension Father     Diabetes Father     Diabetes Maternal Grandfather      I have reviewed and agree with the history as documented      Social History     Tobacco Use    Smoking status: Never Smoker    Smokeless tobacco: Never Used   Substance Use Topics    Alcohol use: Never     Alcohol/week: 0 0 standard drinks     Frequency: Never    Drug use: No        Review of Systems   Constitutional: Positive for activity change and appetite change  Negative for chills and diaphoresis  HENT: Negative  Negative for congestion, drooling, facial swelling, sore throat, trouble swallowing and voice change  Eyes: Negative  Negative for pain, redness and visual disturbance  Respiratory: Negative  Negative for cough, choking, chest tightness, shortness of breath and stridor  Cardiovascular: Positive for near-syncope  Negative for chest pain, leg swelling and syncope  Gastrointestinal: Negative  Negative for abdominal pain, blood in stool, diarrhea, dysphagia, hematochezia, melena and vomiting  Genitourinary: Negative  Negative for dysuria, flank pain, frequency, hematuria and pelvic pain  Musculoskeletal: Negative for neck pain and neck stiffness  Skin: Negative  Negative for rash and wound  Neurological: Positive for dizziness, weakness and numbness  Negative for seizures, loss of consciousness, facial asymmetry, speech difficulty and headaches  Syncope: near syncope  Psychiatric/Behavioral: Positive for decreased concentration  Negative for agitation, hallucinations and self-injury  All other systems reviewed and are negative  Physical Exam  Physical Exam   Constitutional: She appears lethargic  Non-toxic appearance  No distress  HENT:   Head: Normocephalic and atraumatic  Mouth/Throat: Uvula is midline, oropharynx is clear and moist and mucous membranes are normal    Eyes: Pupils are equal, round, and reactive to light  Conjunctivae, EOM and lids are normal    Neck: Full passive range of motion without pain and phonation normal  Neck supple  No JVD present  Cardiovascular: Normal rate, regular rhythm, intact distal pulses and normal pulses  No extrasystoles are present  No perf edema or calf tenderness   Pulmonary/Chest: Effort normal  No accessory muscle usage or stridor  No apnea  No respiratory distress  She has no wheezes  She has no rhonchi  She has no rales  Abdominal: Soft   Bowel sounds are normal  There is no rigidity, no guarding and no CVA tenderness  Neurological: She has normal strength and normal reflexes  She appears lethargic  She is not disoriented  No cranial nerve deficit  She displays no seizure activity  She displays no Babinski's sign on the right side  She displays no Babinski's sign on the left side  Skin: Skin is warm and dry  No abrasion, no petechiae and no rash noted  She is not diaphoretic  No cyanosis  Psychiatric: She has a normal mood and affect  Her speech is normal and behavior is normal  Thought content normal  She is not actively hallucinating  Cognition and memory are normal    Vitals reviewed  Vital Signs  ED Triage Vitals [11/05/19 0821]   Temperature Pulse Respirations Blood Pressure SpO2   98 2 °F (36 8 °C) (!) 108 18 128/64 99 %      Temp Source Heart Rate Source Patient Position - Orthostatic VS BP Location FiO2 (%)   Temporal Monitor Sitting Left arm --      Pain Score       --           Vitals:    11/05/19 0821 11/05/19 0915 11/05/19 0930   BP: 128/64 119/60 121/62   Pulse: (!) 108 85 82   Patient Position - Orthostatic VS: Sitting Sitting Sitting         Visual Acuity      ED Medications  Medications   sodium chloride 0 9 % infusion (0 mL/hr Intravenous Hold 11/5/19 0857)   potassium chloride 20 mEq IVPB (premix) (20 mEq Intravenous New Bag 11/5/19 0936)   sodium chloride 0 9 % bolus 1,000 mL (1,000 mL Intravenous New Bag 11/5/19 0850)   potassium chloride (K-DUR,KLOR-CON) CR tablet 40 mEq (40 mEq Oral Given 11/5/19 0945)       Diagnostic Studies  Results Reviewed     Procedure Component Value Units Date/Time    Lactic acid, plasma [[id number]]  (Normal) Collected:  11/05/19 0846    Lab Status:  Final result Specimen:  Blood from Arm, Left Updated:  11/05/19 0923     LACTIC ACID 0 8 mmol/L     Narrative:       Result may be elevated if tourniquet was used during collection      Phosphorus [[id number]]  (Normal) Collected:  11/05/19 0846    Lab Status:  Final result Specimen:  Blood from Arm, Left Updated:  11/05/19 0919     Phosphorus 4 5 mg/dL     Magnesium [[id number]]  (Normal) Collected:  11/05/19 0846    Lab Status:  Final result Specimen:  Blood from Arm, Left Updated:  11/05/19 0919     Magnesium 2 5 mg/dL     Comprehensive metabolic panel [[id number]]  (Abnormal) Collected:  11/05/19 0846    Lab Status:  Final result Specimen:  Blood from Arm, Left Updated:  11/05/19 0917     Sodium 138 mmol/L      Potassium 1 8 mmol/L      Chloride 101 mmol/L      CO2 22 mmol/L      ANION GAP 15 mmol/L      BUN 19 mg/dL      Creatinine 1 06 mg/dL      Glucose 94 mg/dL      Calcium 8 7 mg/dL      AST 22 U/L      ALT 44 U/L      Alkaline Phosphatase 52 U/L      Total Protein 7 9 g/dL      Albumin 4 0 g/dL      Total Bilirubin 0 40 mg/dL      eGFR 68 ml/min/1 73sq m     Narrative:       [place] guidelines for Chronic Kidney Disease (CKD):     Stage 1 with normal or high GFR (GFR > 90 mL/min/1 73 square meters)    Stage 2 Mild CKD (GFR = 60-89 mL/min/1 73 square meters)    Stage 3A Moderate CKD (GFR = 45-59 mL/min/1 73 square meters)    Stage 3B Moderate CKD (GFR = 30-44 mL/min/1 73 square meters)    Stage 4 Severe CKD (GFR = 15-29 mL/min/1 73 square meters)    Stage 5 End Stage CKD (GFR <15 mL/min/1 73 square meters)  Note: GFR calculation is accurate only with a steady state creatinine    Protime-INR [[id number]]  (Normal) Collected:  11/05/19 0846    Lab Status:  Final result Specimen:  Blood from Arm, Left Updated:  11/05/19 0904     Protime 12 8 seconds      INR 0 96    APTT [[id number]]  (Normal) Collected:  11/05/19 0846    Lab Status:  Final result Specimen:  Blood from Arm, Left Updated:  11/05/19 0904     PTT 27 seconds     CBC and differential [[id number]] Collected:  11/05/19 0846    Lab Status:  Final result Specimen:  Blood from Arm, Left Updated:  11/05/19 0856     WBC 7 95 Thousand/uL      RBC 4 95 Million/uL Hemoglobin 15 1 g/dL      Hematocrit 44 3 %      MCV 90 fL      MCH 30 5 pg      MCHC 34 1 g/dL      RDW 13 7 %      MPV 10 3 fL      Platelets 947 Thousands/uL      nRBC 0 /100 WBCs      Neutrophils Relative 65 %      Immat GRANS % 0 %      Lymphocytes Relative 23 %      Monocytes Relative 10 %      Eosinophils Relative 1 %      Basophils Relative 1 %      Neutrophils Absolute 5 15 Thousands/µL      Immature Grans Absolute 0 02 Thousand/uL      Lymphocytes Absolute 1 81 Thousands/µL      Monocytes Absolute 0 79 Thousand/µL      Eosinophils Absolute 0 11 Thousand/µL      Basophils Absolute 0 07 Thousands/µL     Blood culture #1 [[id number]] Collected:  11/05/19 0846    Lab Status: In process Specimen:  Blood from Arm, Right Updated:  11/05/19 0853    Blood culture #2 [[id number]] Collected:  11/05/19 0846    Lab Status:   In process Specimen:  Blood from Arm, Left Updated:  11/05/19 0853    UA w Reflex to Microscopic w Reflex to Culture [[id number]]     Lab Status:  No result Specimen:  Urine                  No orders to display              Procedures  ECG 12 Lead Documentation Only  Date/Time: 11/5/2019 8:36 AM  Performed by: Shaila Garcia DO  Authorized by: Shaila Garcia DO     ECG reviewed by me, the ED Provider: yes    Patient location:  ED  Interpretation:     Interpretation: non-specific    Rate:     ECG rate assessment: normal    Rhythm:     Rhythm: sinus rhythm    Ectopy:     Ectopy: none    ST segments:     ST segments:  Abnormal  Q waves:     Q waves:  I, II, V3, V4, V5 and V6           ED Course  ED Course as of Nov 05 1012   Tue Nov 05, 2019   0913 WBC: 7 95   0914 Hemoglobin: 15 1   0914 HCT: 44 3   0914 INR: 0 96   0924 Potassium(!!): 1 8   0936 Magnesium: 2 5   0936 LACTIC ACID: 0 8   0936 Phosphorus: 4 5   1011 Paged Dr Nirmala Berrios at request of [facility]                                  MDM  Number of Diagnoses or Management Options  Hypokalemia: established and worsening  Lethargy: new and requires workup  Near syncope: new and requires workup     Amount and/or Complexity of Data Reviewed  Clinical lab tests: ordered and reviewed  Tests in the radiology section of CPT®: ordered and reviewed  Tests in the medicine section of CPT®: ordered and reviewed  Review and summarize past medical records: yes  Discuss the patient with other providers: yes  Independent visualization of images, tracings, or specimens: yes    Risk of Complications, Morbidity, and/or Mortality  Presenting problems: moderate  Diagnostic procedures: moderate  Management options: moderate    Patient Progress  Patient progress: stable      Disposition  Final diagnoses:   Lethargy   Near syncope   Hypokalemia     Time reflects when diagnosis was documented in both MDM as applicable and the Disposition within this note     Time User Action Codes Description Comment    11/5/2019  9:36 AM Effie Forrest Add [R53 83] Lethargy     11/5/2019  9:37 AM Effie Forrest Add [R55] Near syncope     11/5/2019  9:37 AM Ayan Simmons Add [E87 6] Hypokalemia       ED Disposition     None      Follow-up Information    None         Patient's Medications   Discharge Prescriptions    No medications on file     No discharge procedures on file      ED Provider  Electronically Signed by           Braxton Kimbrough DO  11/05/19 4513

## 2019-11-05 NOTE — TELEPHONE ENCOUNTER
Patient in infusion waiting room  Stating overall not feeling week  Remains weak and unable to stand  RN recommending ER evaluation  Transferred to chair without difficulty  Report given to ER staff by Justin Pederson RN  Patient taken to ER and report given to staff

## 2019-11-06 VITALS
SYSTOLIC BLOOD PRESSURE: 103 MMHG | HEART RATE: 77 BPM | DIASTOLIC BLOOD PRESSURE: 59 MMHG | RESPIRATION RATE: 16 BRPM | BODY MASS INDEX: 28.12 KG/M2 | TEMPERATURE: 97.5 F | WEIGHT: 158.73 LBS | OXYGEN SATURATION: 100 %

## 2019-11-06 LAB
ANION GAP SERPL CALCULATED.3IONS-SCNC: 9 MMOL/L (ref 4–13)
ANION GAP SERPL CALCULATED.3IONS-SCNC: 9 MMOL/L (ref 4–13)
BUN SERPL-MCNC: 15 MG/DL (ref 5–25)
BUN SERPL-MCNC: 16 MG/DL (ref 5–25)
CALCIUM SERPL-MCNC: 7.5 MG/DL (ref 8.3–10.1)
CALCIUM SERPL-MCNC: 7.7 MG/DL (ref 8.3–10.1)
CHLORIDE SERPL-SCNC: 111 MMOL/L (ref 100–108)
CHLORIDE SERPL-SCNC: 112 MMOL/L (ref 100–108)
CO2 SERPL-SCNC: 22 MMOL/L (ref 21–32)
CO2 SERPL-SCNC: 22 MMOL/L (ref 21–32)
CREAT SERPL-MCNC: 0.83 MG/DL (ref 0.6–1.3)
CREAT SERPL-MCNC: 0.96 MG/DL (ref 0.6–1.3)
GFR SERPL CREATININE-BSD FRML MDRD: 77 ML/MIN/1.73SQ M
GFR SERPL CREATININE-BSD FRML MDRD: 92 ML/MIN/1.73SQ M
GLUCOSE SERPL-MCNC: 78 MG/DL (ref 65–140)
GLUCOSE SERPL-MCNC: 82 MG/DL (ref 65–140)
MAGNESIUM SERPL-MCNC: 2.1 MG/DL (ref 1.6–2.6)
PHOSPHATE SERPL-MCNC: 2.7 MG/DL (ref 2.7–4.5)
POTASSIUM SERPL-SCNC: 3.3 MMOL/L (ref 3.5–5.3)
POTASSIUM SERPL-SCNC: 3.4 MMOL/L (ref 3.5–5.3)
SODIUM SERPL-SCNC: 142 MMOL/L (ref 136–145)
SODIUM SERPL-SCNC: 143 MMOL/L (ref 136–145)

## 2019-11-06 PROCEDURE — 83735 ASSAY OF MAGNESIUM: CPT | Performed by: FAMILY MEDICINE

## 2019-11-06 PROCEDURE — 99254 IP/OBS CNSLTJ NEW/EST MOD 60: CPT | Performed by: INTERNAL MEDICINE

## 2019-11-06 PROCEDURE — 80048 BASIC METABOLIC PNL TOTAL CA: CPT | Performed by: FAMILY MEDICINE

## 2019-11-06 PROCEDURE — 84100 ASSAY OF PHOSPHORUS: CPT | Performed by: FAMILY MEDICINE

## 2019-11-06 PROCEDURE — 99238 HOSP IP/OBS DSCHRG MGMT 30/<: CPT | Performed by: FAMILY MEDICINE

## 2019-11-06 RX ORDER — POTASSIUM CHLORIDE 14.9 MG/ML
20 INJECTION INTRAVENOUS
Status: COMPLETED | OUTPATIENT
Start: 2019-11-06 | End: 2019-11-06

## 2019-11-06 RX ORDER — POTASSIUM CHLORIDE AND SODIUM CHLORIDE 900; 300 MG/100ML; MG/100ML
50 INJECTION, SOLUTION INTRAVENOUS CONTINUOUS
Status: DISCONTINUED | OUTPATIENT
Start: 2019-11-06 | End: 2019-11-06 | Stop reason: HOSPADM

## 2019-11-06 RX ORDER — POTASSIUM CHLORIDE 20MEQ/15ML
20 LIQUID (ML) ORAL
Status: COMPLETED | OUTPATIENT
Start: 2019-11-06 | End: 2019-11-06

## 2019-11-06 RX ADMIN — AMILORIDE HYDROCLORIDE 5 MG: 5 TABLET ORAL at 08:27

## 2019-11-06 RX ADMIN — TOPIRAMATE 100 MG: 100 TABLET, FILM COATED ORAL at 08:27

## 2019-11-06 RX ADMIN — POTASSIUM CHLORIDE 20 MEQ: 14.9 INJECTION, SOLUTION INTRAVENOUS at 10:43

## 2019-11-06 RX ADMIN — Medication 220 MG: at 08:27

## 2019-11-06 RX ADMIN — POTASSIUM CHLORIDE 20 MEQ: 20 SOLUTION ORAL at 08:09

## 2019-11-06 RX ADMIN — POTASSIUM CHLORIDE 20 MEQ: 20 SOLUTION ORAL at 10:00

## 2019-11-06 RX ADMIN — POTASSIUM CHLORIDE AND SODIUM CHLORIDE 50 ML/HR: 900; 300 INJECTION, SOLUTION INTRAVENOUS at 08:18

## 2019-11-06 RX ADMIN — POTASSIUM CHLORIDE 20 MEQ: 14.9 INJECTION, SOLUTION INTRAVENOUS at 08:10

## 2019-11-06 NOTE — CONSULTS
Consultation - Nephrology   Lennette Cogan 27 y o  female MRN: [id number]  Unit/Bed#: 401-01 Encounter: [id number]      A/P:  1  Hypokalemia   Patient suffered acute on chronic hypokalemia potentially due to combination reduced oral intake for typical supplementation due to feelings of anorexia, versus another source of potassium loss which the patient simply unable to confirm with us  Patient's potassium significant improved over last 18 hours  Continue to supplement aggressively  With respect to disposition, I would be okay with discharge later this afternoon so long as the patient is able to take her potassium supplementation as prescribed  Patient to maintain her outpatient infusion as scheduled for Thursday, November 7th  2  History of hypophosphatemia    Phosphorus levels appropriate  3  Anorexia   Unclear cause of the patient's underlying loss of appetite  She has no other symptoms at this time  She managed to eat supper last night, follow-up ability later this morning  Thank you for allowing us to participate in the care of your patient  Please feel free to contact us regarding the care of this patient, or any other questions/concerns that may be applicable      Patient Active Problem List   Diagnosis    Hypokalemia    History of gastric bypass    Migraine without aura and without status migrainosus, not intractable    Left-sided weakness    Hypophosphatemia    Anemia    Vitamin D deficiency    Weakness of both lower extremities    Weakness of both upper extremities    Elevated CPK    Vitamin B12 deficiency    Cervical lymphadenopathy    Fatigue    Paresthesia of both lower extremities    Paresthesias    B12 deficiency    Gastric bypass status for obesity    GERD (gastroesophageal reflux disease)    Migraine    WAGNER (obstructive sleep apnea)    Other intestinal malabsorption    Acute kidney injury ([address] )    Right ovarian cyst    Chest pain    Intractable nausea and vomiting    Vertigo    Stress fracture of right foot with routine healing       History of Present Illness   Physician Requesting Consult: Aaron Mclaughlin MD  Reason for Consult / Principal Problem:  Hypokalemia  Hx and PE limited by:   HPI: Leesa Butcher is a 27y o  year old female who presented to the emergency department after initially presenting to the infusion Center on November 5th for her regularly scheduled potassium infusions when the patient was noted to be lethargic/weak  Patient claims she has been feeling poorly the last several days prior to beginning on Friday, November 1st   She lost appetite but denies any nausea vomiting diarrhea, and also claimed that she lost approximately 10 lb of weight over the weekend in to presentation on Tuesday, November 5th  In the emergency department the patient was noted to have a potassium level of 1 8 millimole/L  Patient has been taking all medications as prescribed, however, she was unable to tolerate some doses of her typical potassium supplementation  Additional symptoms at presentation included mild periorbital paresthesias with the beginning of but not present bilateral upper extremity paresthesias  Since presentation, she has been given significant amounts of intravenous as well as some oral potassium supplementation and this morning her potassium is increased up to 3 3 millimole/L  She continues have a poor appetite at this time  History obtained from chart review and the patient    Review of Systems - Negative except as mentioned above in HPI, more specifics as mentioned below    Review of Systems - General ROS: positive for  - fatigue and weight loss  Psychological ROS: negative  Ophthalmic ROS: negative  ENT ROS: negative  Allergy and Immunology ROS: negative  Hematological and Lymphatic ROS: negative  Endocrine ROS: negative  Respiratory ROS: no cough, shortness of breath, or wheezing  Cardiovascular ROS: no chest pain or dyspnea on exertion  Gastrointestinal ROS: no abdominal pain, change in bowel habits, or black or bloody stools  Genito-Urinary ROS: no dysuria, trouble voiding, or hematuria  Musculoskeletal ROS: positive for - muscular weakness  Neurological ROS:  As mentioned above  Dermatological ROS: negative    Historical Information   Past Medical History:   Diagnosis Date    H  pylori infection     Hypokalemia     Migraine     Rhabdomyolysis      Past Surgical History:   Procedure Laterality Date    ABDOMINAL SURGERY      APPENDECTOMY      CHOLECYSTECTOMY      COSMETIC SURGERY      FL GUIDED CENTRAL VENOUS ACCESS DEVICE INSERTION  12/21/2018    GASTRIC BYPASS      HYSTERECTOMY      TUNNELED VENOUS PORT PLACEMENT N/A 12/21/2018    Procedure: INSERTION VENOUS PORT (PORT-A-CATH);   Surgeon: Lokesh Nunn DO;  Location: [facility];  Service: General     Social History   Social History     Substance and Sexual Activity   Alcohol Use Never    Alcohol/week: 0 0 standard drinks    Frequency: Never     Social History     Substance and Sexual Activity   Drug Use No     Social History     Tobacco Use   Smoking Status Never Smoker   Smokeless Tobacco Never Used     Family History   Problem Relation Age of Onset    Hypertension Father     Diabetes Father     Diabetes Maternal Grandfather        Meds/Allergies   all current active meds have been reviewed, current meds:   Current Facility-Administered Medications   Medication Dose Route Frequency    AMILoride tablet 5 mg  5 mg Oral Daily    amitriptyline (ELAVIL) tablet 50 mg  50 mg Oral HS    enoxaparin (LOVENOX) subcutaneous injection 40 mg  40 mg Subcutaneous Daily    potassium chloride 10 % oral solution 20 mEq  20 mEq Oral Q2H    potassium chloride 20 mEq IVPB (premix)  20 mEq Intravenous Q2H    sodium chloride 0 9 % with KCl 40 mEq/L infusion (premix)  50 mL/hr Intravenous Continuous    SUMAtriptan (IMITREX) tablet 100 mg  100 mg Oral Daily PRN    topiramate (TOPAMAX) tablet 100 mg  100 mg Oral BID    zinc sulfate (ZINCATE) capsule 220 mg  220 mg Oral Daily    and PTA meds:    Medications Prior to Admission   Medication    AMILoride 5 mg tablet    amitriptyline (ELAVIL) 50 mg tablet    b complex-C-folic acid (NEPHRO-EDIE) 0 8 mg tablet    ergocalciferol (ERGOCALCIFEROL) 58088 units capsule    ferrous sulfate 325 (65 Fe) mg tablet    meclizine (ANTIVERT) 25 mg tablet    potassium chloride 10 % oral solution    SUMAtriptan (IMITREX) 100 mg tablet    thiamine 100 MG tablet    topiramate (TOPAMAX) 100 mg tablet    zinc sulfate (ZINCATE) 220 mg capsule         Allergies   Allergen Reactions    Nsaids Other (See Comments)     Other reaction(s): Other (Please comment)  Should not take s/p bariatric surgery  Other reaction(s): Other (See Comments)  Should not take as per prior records  Should not take s/p bariatric surgery  Has hx of gastric bypass      Prednisone      Nausea, vomiting, diarrhea       Objective     Intake/Output Summary (Last 24 hours) at 11/6/2019 0759  Last data filed at 11/5/2019 2138  Gross per 24 hour   Intake 2290 ml   Output    Net 2290 ml       Invasive Devices:        Physical Exam      I/O last 3 completed shifts: In: 4346 [I V :1040; IV Piggyback:1250]  Out: -     Vitals:    11/06/19 0713   BP: 103/59   Pulse: 77   Resp: 16   Temp: 97 5 °F (36 4 °C)   SpO2: 100%       Gen: in NAD, Alert/Awake  HEENT: no sclerous icterus, MMM, neck supple  CV: +S1/S2, RRR  Lungs: CTA bilaterally  Abd: +BS, soft NT/ND  Ext: all four extremities are warm  Skin: no rashes noted  Neuro: CN II-XII intact    Current Weight: Weight - Scale: 72 kg (158 lb 11 7 oz)  First Weight: Weight - Scale: 72 kg (158 lb 11 7 oz)    Lab Results:  I have personally reviewed pertinent labs      CBC:   Lab Results   Component Value Date    WBC 7 95 11/05/2019    HGB 15 1 11/05/2019    HCT 44 3 11/05/2019    MCV 90 11/05/2019     11/05/2019    MCH 30 5 11/05/2019    MCHC 34 1 11/05/2019    RDW 13 7 11/05/2019    MPV 10 4 11/05/2019    NRBC 0 11/05/2019     CMP:   Lab Results   Component Value Date    K 3 3 (L) 11/06/2019     (H) 11/06/2019    CO2 22 11/06/2019    BUN 15 11/06/2019    CREATININE 0 83 11/06/2019    CALCIUM 7 5 (L) 11/06/2019    AST 22 11/05/2019    ALT 44 11/05/2019    ALKPHOS 52 11/05/2019    EGFR 92 11/06/2019     Phosphorus:   Lab Results   Component Value Date    PHOS 2 7 11/06/2019     Magnesium:   Lab Results   Component Value Date    MG 2 1 11/06/2019     Urinalysis:   Lab Results   Component Value Date    COLORU Yellow 11/05/2019    CLARITYU Clear 11/05/2019    SPECGRAV 1 020 11/05/2019    PHUR 6 5 11/05/2019    LEUKOCYTESUR Negative 11/05/2019    NITRITE Negative 11/05/2019    GLUCOSEU Negative 11/05/2019    KETONESU 15 (1+) (A) 11/05/2019    BILIRUBINUR Negative 11/05/2019    BLOODU Negative 11/05/2019     Ionized Calcium: No results found for: CAION  Coagulation:   Lab Results   Component Value Date    INR 0 96 11/05/2019     Troponin: No results found for: TROPONINI  ABG: No results found for: PHART, PMZ3EHM, PO2ART, QBA3FHB, W2DUOGPY, BEART, SOURCE    Results from last 7 days   Lab Units 11/06/19  0545 11/06/19  0253 11/05/19  1904  11/05/19  0846   POTASSIUM mmol/L 3 3* 3 4* 2 7*   < > 1 8*   CHLORIDE mmol/L 112* 111* 105   < > 101   CO2 mmol/L 22 22 23   < > 22   BUN mg/dL 15 16 12   < > 19   CREATININE mg/dL 0 83 0 96 1 09   < > 1 06   CALCIUM mg/dL 7 5* 7 7* 7 9*   < > 8 7   ALK PHOS U/L  --   --   --   --  52   ALT U/L  --   --   --   --  44   AST U/L  --   --   --   --  22    < > = values in this interval not displayed  Radiology review:  No results found          Amy Phillips DO

## 2019-11-06 NOTE — UTILIZATION REVIEW
Initial Clinical Review    Admission: Date/Time/Statement: Inpatient Admission Orders (From admission, onward)     Ordered        11/05/19 1020  Inpatient Admission  Once                   Orders Placed This Encounter   Procedures    Inpatient Admission     Standing Status:   Standing     Number of Occurrences:   1     Order Specific Question:   Admitting Physician     Answer:   Leila Munguia     Order Specific Question:   Level of Care     Answer:   Med Surg [16]     Order Specific Question:   Estimated length of stay     Answer:   More than 2 Midnights     Order Specific Question:   Certification     Answer:   I certify that inpatient services are medically necessary for this patient for a duration of greater than two midnights  See H&P and MD Progress Notes for additional information about the patient's course of treatment  ED Arrival Information     Expected Arrival Acuity Means of Arrival Escorted By Service Admission Type    - 11/5/2019 08:20 Emergent Wheelchair Self General Medicine Emergency    Arrival Complaint    -        Chief Complaint   Patient presents with    Lethargy     patient brought down from infusion  patient very lethargic      Assessment/Plan:    27  Y O female  Presents to ED  From home with generalized weakness, found  With K of  1 8, decreased appetite  And  10 lb weight loss  In past  4 days  PMH  Is  Recurrent  Hypokalemia, unknown etiology and requires  Frequent KCL infusions  ADMIT  IP  MED SURG  TELE  WITH   Hypoaklemia  And plan is  Aggressively  Replace   And  Monitor labs  Per  Nephrology  Consult:      Pt  Suffers acute/chronic hypokalemia D/T combination  Of  Reduced oral intake, anorexia   Vs some other source  Potassium has  Significantly  Improved with aggressive supplementation   Will need  Continued  Potassium supplementation on d/c       ED Triage Vitals   Temperature Pulse Respirations Blood Pressure SpO2   11/05/19 0821 11/05/19 0821 11/05/19 0821 11/05/19 8541 11/05/19 0821   98 2 °F (36 8 °C) (!) 108 18 128/64 99 %      Temp Source Heart Rate Source Patient Position - Orthostatic VS BP Location FiO2 (%)   11/05/19 0821 11/05/19 0821 11/05/19 0821 11/05/19 0821 --   Temporal Monitor Sitting Left arm       Pain Score       11/05/19 1423       No Pain        Wt Readings from Last 1 Encounters:   11/05/19 72 kg (158 lb 11 7 oz)     Additional Vital Signs:   11/06/19 0821              None (Room air)     11/06/19 07:13:32  97 5 °F (36 4 °C)  77  16  103/59  74  100 %  None (Room air)  Sitting   11/05/19 22:31:28  97 9 °F (36 6 °C)  76    98/47Abnormal   64  99 %       11/05/19 18:26:33  97 9 °F (36 6 °C)  85  18  110/73  85  100 %       11/05/19 1616    82  18  118/58    100 %  None (Room air)  Sitting   11/05/19 1430    86  16  116/55    100 %  None (Room air)  Lying   11/05/19 1330    91  16  118/57    100 %  None (Room air)  Sitting   11/05/19 1230    85  18  112/78    100 %  None (Room air)  Sitting   11/05/19 1215    101  15  114/84    97 %  None (Room air)  Sitting   11/05/19 1200    103  18      100 %  None (Room air)  Sitting   11/05/19 1130    98  20  125/101Abnormal     100 %  None (Room air)  Sitting   11/05/19 1115    94  20  127/65    100 %  None (Room air)  Sitting   11/05/19 1030    88  20  124/61    96 %  None (Room air)  Sitting   11/05/19 0930    82  16  121/62    98 %  None (Room air)  Sitting   11/05/19 0915    85  20  119/60    97 %  None (Room air)  Sitting   11/05/19 0821  98 2 °F (36 8 °C)  108Abnormal   18  128/64    99 %  None (Room air)           Pertinent Labs/Diagnostic Test Results:   EKG: (  11/5)   NSR     Q  Waves  1  11    V3    V4    V5   And  V 6   Abnormal ST segments    Results from last 7 days   Lab Units 11/05/19  1321 11/05/19  0846   WBC Thousand/uL  --  7 95   HEMOGLOBIN g/dL  --  15 1   HEMATOCRIT %  --  44 3   PLATELETS Thousands/uL 279 339   NEUTROS ABS Thousands/µL  --  5 15 Results from last 7 days   Lab Units 11/06/19  0545 11/06/19  0253 11/05/19  1904 11/05/19  1321 11/05/19  0846   SODIUM mmol/L 143 142 139 137 138   POTASSIUM mmol/L 3 3* 3 4* 2 7* 2 5* 1 8*   CHLORIDE mmol/L 112* 111* 105 104 101   CO2 mmol/L 22 22 23 22 22   ANION GAP mmol/L 9 9 11 11 15*   BUN mg/dL 15 16 12 13 19   CREATININE mg/dL 0 83 0 96 1 09 0 91 1 06   EGFR ml/min/1 73sq m 92 77 66 82 68   CALCIUM mg/dL 7 5* 7 7* 7 9* 8 0* 8 7   MAGNESIUM mg/dL 2 1  --   --   --  2 5   PHOSPHORUS mg/dL 2 7  --   --   --  4 5     Results from last 7 days   Lab Units 11/05/19  0846   AST U/L 22   ALT U/L 44   ALK PHOS U/L 52   TOTAL PROTEIN g/dL 7 9   ALBUMIN g/dL 4 0   TOTAL BILIRUBIN mg/dL 0 40         Results from last 7 days   Lab Units 11/06/19  0545 11/06/19  0253 11/05/19  1904 11/05/19  1321 11/05/19  0846 10/31/19  0908   GLUCOSE RANDOM mg/dL 78 82 97 76 94 65       Results from last 7 days   Lab Units 11/05/19  0846   PROTIME seconds 12 8   INR  0 96   PTT seconds 27             Results from last 7 days   Lab Units 11/05/19  0846   LACTIC ACID mmol/L 0 8           Results from last 7 days   Lab Units 11/05/19  1226   CLARITY UA  Clear   COLOR UA  Yellow   SPEC GRAV UA  1 020   PH UA  6 5   GLUCOSE UA mg/dl Negative   KETONES UA mg/dl 15 (1+)*   BLOOD UA  Negative   PROTEIN UA mg/dl Negative   NITRITE UA  Negative   BILIRUBIN UA  Negative   UROBILINOGEN UA E U /dl 0 2   LEUKOCYTES UA  Negative         ED Treatment:   Medication Administration from 11/05/2019 0820 to 11/05/2019 1747       Date/Time Order Dose Route Action     11/05/2019 0950 sodium chloride 0 9 % bolus 1,000 mL 0 mL Intravenous Stopped     11/05/2019 0850 sodium chloride 0 9 % bolus 1,000 mL 1,000 mL Intravenous New Bag     11/05/2019 0857 sodium chloride 0 9 % infusion 0 mL/hr Intravenous Hold     11/05/2019 1034 potassium chloride 20 mEq IVPB (premix) 0 mEq Intravenous Stopped     11/05/2019 0936 potassium chloride 20 mEq IVPB (premix) 20 mEq Intravenous New Bag     11/05/2019 0945 potassium chloride (K-DUR,KLOR-CON) CR tablet 40 mEq 40 mEq Oral Given     11/05/2019 1102 enoxaparin (LOVENOX) subcutaneous injection 40 mg 40 mg Subcutaneous Not Given     11/05/2019 1612 potassium chloride 10 % oral solution 40 mEq   Oral Not Given     11/05/2019 1323 potassium chloride 10 % oral solution 40 mEq 40 mEq Oral Given     11/05/2019 1059 potassium chloride 10 % oral solution 40 mEq 40 mEq Oral Given     11/05/2019 1543 potassium chloride 20 mEq IVPB (premix) 0 mEq Intravenous Stopped     11/05/2019 1325 potassium chloride 20 mEq IVPB (premix) 20 mEq Intravenous New Bag     11/05/2019 1259 potassium chloride 20 mEq IVPB (premix) 0 mEq Intravenous Stopped     11/05/2019 1059 potassium chloride 20 mEq IVPB (premix) 20 mEq Intravenous New Bag     11/05/2019 1114 sodium chloride 0 9 % with KCl 40 mEq/L infusion (premix) 100 mL/hr Intravenous New Bag     11/05/2019 1607 potassium chloride 20 mEq IVPB (premix) 20 mEq Intravenous New Bag     11/05/2019 1607 potassium chloride 10 % oral solution 20 mEq 20 mEq Oral Given          Present on Admission:   Hypokalemia   GERD (gastroesophageal reflux disease)      Admitting Diagnosis: Hypokalemia [E87 6]  Lethargy [R53 83]  Weakness [R53 1]  Near syncope [R55]  Age/Sex: 27 y o  female  Admission Orders:  Scheduled Medications:    Medications:  AMILoride 5 mg Oral Daily   amitriptyline 50 mg Oral HS   enoxaparin 40 mg Subcutaneous Daily   potassium chloride 20 mEq Oral Q2H   potassium chloride 20 mEq Intravenous Q2H   topiramate 100 mg Oral BID   zinc sulfate 220 mg Oral Daily     Continuous IV Infusions:    sodium chloride 0 9 % with KCl 40 mEq/L 50 mL/hr Intravenous Continuous     PRN Meds:    SUMAtriptan 100 mg Oral Daily PRN       IP CONSULT TO NEPHROLOGY     tele    Network Utilization Review Department  [email or web address]  ATTENTION: Please call with any questions or concerns to [phone] and carefully listen to the prompts so that you are directed to the right person  All voicemails are confidential   [place] Can all requests for admission clinical reviews, approved or denied determinations and any other requests to dedicated fax number below belonging to the campus where the patient is receiving treatment    FACILITY NAME UR FAX NUMBER   ADMISSION DENIALS (Administrative/Medical Necessity) 46[address] [facility] (Maternity/NICU/Pediatrics) [phone]   [facility] [address] 300 [facility] [phone]   [address] [place] [address] 1525 [facility] [phone]   [facility]  [address] 443 [facility] 064 [facility] [phone]

## 2019-11-06 NOTE — DISCHARGE SUMMARY
Discharge Summary - Azul Argueta 27 y o  female MRN: [id number]    Unit/Bed#: 401-01 Encounter: [id number]    Admission Date:   Admission Orders (From admission, onward)     Ordered        11/05/19 1020  Inpatient Admission  Once                     Admitting Diagnosis: Hypokalemia [E87 6]  Lethargy [R53 83]  Weakness [R53 1]  Near syncope [R55]    HPI: Patient is a pleasant 27 Year old female with recurrent hypokalemia of unknown etiology, has undergone works up at [facility] and was recommended to gain weight  Followed by Dr Refugio Boxer in the outpatient setting and gets frequent outpatient KCL infusion  Presents today with generalized weakness and found to have a K of 1 8  She states she had a decreased appetite over the past 4 days and has lost 10 lbs  She denies any vomiting or diarrhea     Procedures Performed:   Orders Placed This Encounter   Procedures    ED ECG Documentation Only       Summary of Hospital Course:     Recurrent hypokalemia    Patient has a known history of recurrent hypokalemia, she has underwent extensive workup at [facility]  She will maintain an adequate weight (usually above 158 lbs) in order to have normal K levels  Patient stated that over the weekend she has lost 10 lb  She states she has had no appetite and was only drinking broth  She presents to the ED with generalized malaise and was found have a potassium of 1 8  She was aggressively repleted with IV and oral potassium  Potassium level was trended and increased appropriately  Patient was seen in consultation with Nephrology and no additional interventions were warranted    She will be discharged this afternoon after receiving additional IV and oral potassium and resume her oral potassium regimen at home      The patient improved much quicker than anticipated and did not require a 2 midnight stay    Significant Findings, Care, Treatment and Services Provided:     Complications: none    Discharge Diagnosis: see above    Resolved Problems  Date Reviewed: 10/2/2019    None          Condition at Discharge: fair         Discharge instructions/Information to patient and family:   See after visit summary for information provided to patient and family  Provisions for Follow-Up Care:  See after visit summary for information related to follow-up care and any pertinent home health orders  PCP: Ramsey Aguilar DO    Disposition: Home    Planned Readmission: No      Discharge Statement   I spent 25 minutes discharging the patient  This time was spent on the day of discharge  I had direct contact with the patient on the day of discharge  Additional documentation is required if more than 30 minutes were spent on discharge  Discharge Medications:  See after visit summary for reconciled discharge medications provided to patient and family

## 2019-11-06 NOTE — SOCIAL WORK
Visited patient in her hospital room to initiate discharge planning  Patient lives with her spouse in a 1 story home without steps to enter  She ambulates independently without dme and drives  Denies need for any services on discharge  Family will transport her home  [facility] and denies problems obtaining her meds  Has a port and comes to outpatient infusion 2 x weekly  Dr Mitali Conteh is her FMD   CM reviewed d/c planning process including the following: identifying help at home, patient preference for d/c planning needs, availability of treatment team to discuss questions or concerns patient and/or family may have regarding understanding medications and recognizing signs and symptoms once discharged  CM also encouraged patient to follow up with all recommended appointments after discharge  Patient advised of importance for patient and family to participate in managing patients medical well being

## 2019-11-07 ENCOUNTER — HOSPITAL ENCOUNTER (OUTPATIENT)
Dept: INFUSION CENTER | Facility: HOSPITAL | Age: 34
Discharge: HOME | End: 2019-11-07
Attending: INTERNAL MEDICINE

## 2019-11-07 NOTE — PROGRESS NOTES
Patient called to cancel her apt due to she was told as inpatient she didn't need her infusion, however per Dr Sharona Watson note she is suppose to still come for her schedule infusion  I called patient and made her aware of same, her K is 3 3 on discharge  Awaiting return call at this time

## 2019-11-10 LAB
BACTERIA BLD CULT: NORMAL
BACTERIA BLD CULT: NORMAL

## 2019-11-11 ENCOUNTER — HOSPITAL ENCOUNTER (OUTPATIENT)
Dept: INFUSION CENTER | Facility: HOSPITAL | Age: 34
Discharge: HOME | End: 2019-11-11
Payer: COMMERCIAL

## 2019-11-11 VITALS
HEART RATE: 98 BPM | RESPIRATION RATE: 18 BRPM | DIASTOLIC BLOOD PRESSURE: 62 MMHG | TEMPERATURE: 98.4 F | OXYGEN SATURATION: 100 % | SYSTOLIC BLOOD PRESSURE: 129 MMHG

## 2019-11-11 DIAGNOSIS — E87.6 HYPOKALEMIA: Primary | ICD-10-CM

## 2019-11-11 LAB
ANION GAP SERPL CALCULATED.3IONS-SCNC: 9 MMOL/L (ref 4–13)
BUN SERPL-MCNC: 14 MG/DL (ref 5–25)
CALCIUM SERPL-MCNC: 8.6 MG/DL (ref 8.3–10.1)
CHLORIDE SERPL-SCNC: 104 MMOL/L (ref 100–108)
CO2 SERPL-SCNC: 22 MMOL/L (ref 21–32)
CREAT SERPL-MCNC: 0.96 MG/DL (ref 0.6–1.3)
GFR SERPL CREATININE-BSD FRML MDRD: 77 ML/MIN/1.73SQ M
GLUCOSE SERPL-MCNC: 84 MG/DL (ref 65–140)
POTASSIUM SERPL-SCNC: 3 MMOL/L (ref 3.5–5.3)
SODIUM SERPL-SCNC: 135 MMOL/L (ref 136–145)

## 2019-11-11 PROCEDURE — 96366 THER/PROPH/DIAG IV INF ADDON: CPT

## 2019-11-11 PROCEDURE — 96365 THER/PROPH/DIAG IV INF INIT: CPT

## 2019-11-11 PROCEDURE — 80048 BASIC METABOLIC PNL TOTAL CA: CPT | Performed by: INTERNAL MEDICINE

## 2019-11-11 RX ORDER — SODIUM CHLORIDE 9 MG/ML
20 INJECTION, SOLUTION INTRAVENOUS CONTINUOUS
Status: CANCELLED
Start: 2019-11-14

## 2019-11-11 RX ORDER — SODIUM CHLORIDE 9 MG/ML
20 INJECTION, SOLUTION INTRAVENOUS CONTINUOUS
Status: DISCONTINUED | OUTPATIENT
Start: 2019-11-11 | End: 2019-11-14 | Stop reason: HOSPADM

## 2019-11-11 RX ADMIN — POTASSIUM CHLORIDE: 2 INJECTION, SOLUTION, CONCENTRATE INTRAVENOUS at 10:35

## 2019-11-11 RX ADMIN — SODIUM CHLORIDE 20 ML/HR: 0.9 INJECTION, SOLUTION INTRAVENOUS at 09:18

## 2019-11-11 NOTE — PROGRESS NOTES
Patient couldn't stay for 6 hours of potassium due to kids getting off the of bus, 4 hours given  Patient tolerated infusion well, patient left in stable condition

## 2019-11-11 NOTE — UTILIZATION REVIEW
Notification of Discharge  This is a Notification of Discharge from our facility [address]  Please be advised that this patient has been discharge from our facility  Below you will find the admission and discharge date and time including the patients disposition  PRESENTATION DATE: 11/5/2019  8:20 AM  OBS ADMISSION DATE:   IP ADMISSION DATE: 11/5/19 1019   DISCHARGE DATE: 11/6/2019  2:08 PM  DISPOSITION: Home/Self Care Home/Self Care   Admission Orders listed below:  Admission Orders (From admission, onward)     Ordered        11/05/19 1020  Inpatient Admission  Once                   Please contact the UR Department if additional information is required to close this patient's authorization/case  [facility] Utilization Review Department  Main: [phone] x carefully listen to the prompts  All voicemails are confidential   [email or web address]  Send all requests for admission clinical reviews, approved or denied determinations and any other requests to dedicated fax number below belonging to the campus where the patient is receiving treatment    List of dedicated fax numbers:  [address] DENIALS (Administrative/Medical Necessity) [phone]   [address] (Maternity/NICU/Pediatrics) [phone]   Felicia Miranda [phone]   Tyler TomaszCounts include 234 beds at the [facility] [phone]   [facility] [phone]   145 [facility] 1525 [facility] [phone]   Catia Guillory [address] 443 [facility] 500 [facility] [phone]

## 2019-11-14 ENCOUNTER — HOSPITAL ENCOUNTER (OUTPATIENT)
Dept: INFUSION CENTER | Facility: HOSPITAL | Age: 34
Discharge: HOME | End: 2019-11-14
Payer: COMMERCIAL

## 2019-11-14 VITALS
OXYGEN SATURATION: 100 % | RESPIRATION RATE: 18 BRPM | TEMPERATURE: 98.3 F | HEART RATE: 95 BPM | SYSTOLIC BLOOD PRESSURE: 114 MMHG | DIASTOLIC BLOOD PRESSURE: 56 MMHG

## 2019-11-14 DIAGNOSIS — E87.6 HYPOKALEMIA: Primary | ICD-10-CM

## 2019-11-14 LAB
ANION GAP SERPL CALCULATED.3IONS-SCNC: 13 MMOL/L (ref 4–13)
BUN SERPL-MCNC: 16 MG/DL (ref 5–25)
CALCIUM SERPL-MCNC: 8.3 MG/DL (ref 8.3–10.1)
CHLORIDE SERPL-SCNC: 106 MMOL/L (ref 100–108)
CO2 SERPL-SCNC: 19 MMOL/L (ref 21–32)
CREAT SERPL-MCNC: 1.05 MG/DL (ref 0.6–1.3)
GFR SERPL CREATININE-BSD FRML MDRD: 69 ML/MIN/1.73SQ M
GLUCOSE SERPL-MCNC: 89 MG/DL (ref 65–140)
POTASSIUM SERPL-SCNC: 3.7 MMOL/L (ref 3.5–5.3)
SODIUM SERPL-SCNC: 138 MMOL/L (ref 136–145)

## 2019-11-14 PROCEDURE — 96365 THER/PROPH/DIAG IV INF INIT: CPT

## 2019-11-14 PROCEDURE — 80048 BASIC METABOLIC PNL TOTAL CA: CPT | Performed by: INTERNAL MEDICINE

## 2019-11-14 PROCEDURE — 96366 THER/PROPH/DIAG IV INF ADDON: CPT

## 2019-11-14 RX ORDER — SODIUM CHLORIDE 9 MG/ML
20 INJECTION, SOLUTION INTRAVENOUS CONTINUOUS
Status: CANCELLED
Start: 2019-11-18

## 2019-11-14 RX ORDER — SODIUM CHLORIDE 9 MG/ML
20 INJECTION, SOLUTION INTRAVENOUS CONTINUOUS
Status: DISCONTINUED | OUTPATIENT
Start: 2019-11-14 | End: 2019-11-17 | Stop reason: HOSPADM

## 2019-11-14 RX ADMIN — POTASSIUM CHLORIDE: 2 INJECTION, SOLUTION, CONCENTRATE INTRAVENOUS at 10:23

## 2019-11-14 RX ADMIN — SODIUM CHLORIDE 20 ML/HR: 0.9 INJECTION, SOLUTION INTRAVENOUS at 08:50

## 2019-11-14 NOTE — PROGRESS NOTES
1444 Patient tolerated infusion well  She was not able to stay for entire infusion due to  issues   She was discharged in stable condition

## 2019-11-14 NOTE — PLAN OF CARE
Problem: Knowledge Deficit  Goal: Patient/family/caregiver demonstrates understanding of disease process, treatment plan, medications, and discharge instructions  Description  Complete learning assessment and assess knowledge base    Interventions:  - Provide teaching at level of understanding  - Provide teaching via preferred learning methods  Outcome: Progressing     Problem: INFECTION - ADULT  Goal: Absence or prevention of progression during hospitalization  Description  INTERVENTIONS:  - Assess and monitor for signs and symptoms of infection  - Monitor lab/diagnostic results  - Monitor all insertion sites, i e  indwelling lines, tubes, and drains  - Monitor endotracheal if appropriate and nasal secretions for changes in amount and color  - [place] appropriate cooling/warming therapies per order  - Administer medications as ordered  - Instruct and encourage patient and family to use good hand hygiene technique  - Identify and instruct in appropriate isolation precautions for identified infection/condition  Outcome: Progressing

## 2019-11-18 ENCOUNTER — HOSPITAL ENCOUNTER (OUTPATIENT)
Dept: INFUSION CENTER | Facility: HOSPITAL | Age: 34
Discharge: HOME | End: 2019-11-18
Payer: COMMERCIAL

## 2019-11-18 VITALS
HEART RATE: 88 BPM | DIASTOLIC BLOOD PRESSURE: 51 MMHG | SYSTOLIC BLOOD PRESSURE: 107 MMHG | OXYGEN SATURATION: 100 % | TEMPERATURE: 97.9 F | RESPIRATION RATE: 18 BRPM

## 2019-11-18 DIAGNOSIS — E87.6 HYPOKALEMIA: Primary | ICD-10-CM

## 2019-11-18 LAB
ANION GAP SERPL CALCULATED.3IONS-SCNC: 10 MMOL/L (ref 4–13)
BUN SERPL-MCNC: 11 MG/DL (ref 5–25)
CALCIUM SERPL-MCNC: 8 MG/DL (ref 8.3–10.1)
CHLORIDE SERPL-SCNC: 106 MMOL/L (ref 100–108)
CO2 SERPL-SCNC: 22 MMOL/L (ref 21–32)
CREAT SERPL-MCNC: 0.81 MG/DL (ref 0.6–1.3)
GFR SERPL CREATININE-BSD FRML MDRD: 94 ML/MIN/1.73SQ M
GLUCOSE SERPL-MCNC: 73 MG/DL (ref 65–140)
POTASSIUM SERPL-SCNC: 3.3 MMOL/L (ref 3.5–5.3)
SODIUM SERPL-SCNC: 138 MMOL/L (ref 136–145)

## 2019-11-18 PROCEDURE — 96366 THER/PROPH/DIAG IV INF ADDON: CPT

## 2019-11-18 PROCEDURE — 80048 BASIC METABOLIC PNL TOTAL CA: CPT | Performed by: INTERNAL MEDICINE

## 2019-11-18 PROCEDURE — 96365 THER/PROPH/DIAG IV INF INIT: CPT

## 2019-11-18 RX ORDER — SODIUM CHLORIDE 9 MG/ML
20 INJECTION, SOLUTION INTRAVENOUS CONTINUOUS
Status: DISCONTINUED | OUTPATIENT
Start: 2019-11-18 | End: 2019-11-21 | Stop reason: HOSPADM

## 2019-11-18 RX ORDER — SODIUM CHLORIDE 9 MG/ML
20 INJECTION, SOLUTION INTRAVENOUS CONTINUOUS
Status: CANCELLED
Start: 2019-11-21

## 2019-11-18 RX ADMIN — POTASSIUM CHLORIDE: 2 INJECTION, SOLUTION, CONCENTRATE INTRAVENOUS at 10:14

## 2019-11-18 NOTE — PROGRESS NOTES
Patient tolerated infusion well, patient did not receive full 60meq of potassium due to having to leave early for her children  Patient left in stable condition

## 2019-11-21 ENCOUNTER — HOSPITAL ENCOUNTER (OUTPATIENT)
Dept: INFUSION CENTER | Facility: HOSPITAL | Age: 34
Discharge: HOME | End: 2019-11-21
Payer: COMMERCIAL

## 2019-11-21 VITALS
TEMPERATURE: 96.2 F | HEART RATE: 87 BPM | OXYGEN SATURATION: 100 % | SYSTOLIC BLOOD PRESSURE: 106 MMHG | RESPIRATION RATE: 18 BRPM | DIASTOLIC BLOOD PRESSURE: 63 MMHG

## 2019-11-21 DIAGNOSIS — E87.6 HYPOKALEMIA: Primary | ICD-10-CM

## 2019-11-21 LAB
ANION GAP SERPL CALCULATED.3IONS-SCNC: 11 MMOL/L (ref 4–13)
BUN SERPL-MCNC: 12 MG/DL (ref 5–25)
CALCIUM SERPL-MCNC: 8.2 MG/DL (ref 8.3–10.1)
CHLORIDE SERPL-SCNC: 108 MMOL/L (ref 100–108)
CO2 SERPL-SCNC: 22 MMOL/L (ref 21–32)
CREAT SERPL-MCNC: 0.86 MG/DL (ref 0.6–1.3)
GFR SERPL CREATININE-BSD FRML MDRD: 88 ML/MIN/1.73SQ M
GLUCOSE SERPL-MCNC: 74 MG/DL (ref 65–140)
POTASSIUM SERPL-SCNC: 4.3 MMOL/L (ref 3.5–5.3)
SODIUM SERPL-SCNC: 141 MMOL/L (ref 136–145)

## 2019-11-21 PROCEDURE — 96366 THER/PROPH/DIAG IV INF ADDON: CPT

## 2019-11-21 PROCEDURE — 80048 BASIC METABOLIC PNL TOTAL CA: CPT | Performed by: INTERNAL MEDICINE

## 2019-11-21 PROCEDURE — 96365 THER/PROPH/DIAG IV INF INIT: CPT

## 2019-11-21 RX ORDER — SODIUM CHLORIDE 9 MG/ML
20 INJECTION, SOLUTION INTRAVENOUS CONTINUOUS
Status: CANCELLED
Start: 2019-11-25

## 2019-11-21 RX ORDER — SODIUM CHLORIDE 9 MG/ML
20 INJECTION, SOLUTION INTRAVENOUS CONTINUOUS
Status: DISCONTINUED | OUTPATIENT
Start: 2019-11-21 | End: 2019-11-24 | Stop reason: HOSPADM

## 2019-11-21 RX ADMIN — SODIUM CHLORIDE 20 ML/HR: 0.9 INJECTION, SOLUTION INTRAVENOUS at 09:14

## 2019-11-21 RX ADMIN — POTASSIUM CHLORIDE: 2 INJECTION, SOLUTION, CONCENTRATE INTRAVENOUS at 09:59

## 2019-11-21 NOTE — PLAN OF CARE
Problem: INFECTION - ADULT  Goal: Absence or prevention of progression during hospitalization  Description  INTERVENTIONS:  - Assess and monitor for signs and symptoms of infection  - Monitor lab/diagnostic results  - Monitor all insertion sites, i e  indwelling lines, tubes, and drains  - Monitor endotracheal if appropriate and nasal secretions for changes in amount and color  - [place] appropriate cooling/warming therapies per order  - Administer medications as ordered  - Instruct and encourage patient and family to use good hand hygiene technique  - Identify and instruct in appropriate isolation precautions for identified infection/condition  Outcome: Progressing     Problem: Knowledge Deficit  Goal: Patient/family/caregiver demonstrates understanding of disease process, treatment plan, medications, and discharge instructions  Description  Complete learning assessment and assess knowledge base    Interventions:  - Provide teaching at level of understanding  - Provide teaching via preferred learning methods  Outcome: Progressing

## 2019-12-01 RX ORDER — SODIUM CHLORIDE 9 MG/ML
20 INJECTION, SOLUTION INTRAVENOUS CONTINUOUS
Status: CANCELLED
Start: 2019-12-02

## 2019-12-02 ENCOUNTER — HOSPITAL ENCOUNTER (OUTPATIENT)
Dept: INFUSION CENTER | Facility: HOSPITAL | Age: 34
Discharge: HOME | End: 2019-12-02

## 2019-12-05 ENCOUNTER — HOSPITAL ENCOUNTER (OUTPATIENT)
Dept: INFUSION CENTER | Facility: HOSPITAL | Age: 34
Discharge: HOME | End: 2019-12-05
Payer: COMMERCIAL

## 2019-12-05 VITALS
DIASTOLIC BLOOD PRESSURE: 53 MMHG | RESPIRATION RATE: 18 BRPM | TEMPERATURE: 98.1 F | OXYGEN SATURATION: 100 % | SYSTOLIC BLOOD PRESSURE: 108 MMHG | HEART RATE: 89 BPM

## 2019-12-05 DIAGNOSIS — E87.6 HYPOKALEMIA: Primary | ICD-10-CM

## 2019-12-05 LAB
ANION GAP SERPL CALCULATED.3IONS-SCNC: 10 MMOL/L (ref 4–13)
BUN SERPL-MCNC: 12 MG/DL (ref 5–25)
CALCIUM SERPL-MCNC: 8.1 MG/DL (ref 8.3–10.1)
CHLORIDE SERPL-SCNC: 109 MMOL/L (ref 100–108)
CO2 SERPL-SCNC: 21 MMOL/L (ref 21–32)
CREAT SERPL-MCNC: 0.83 MG/DL (ref 0.6–1.3)
GFR SERPL CREATININE-BSD FRML MDRD: 92 ML/MIN/1.73SQ M
GLUCOSE SERPL-MCNC: 94 MG/DL (ref 65–140)
POTASSIUM SERPL-SCNC: 3.2 MMOL/L (ref 3.5–5.3)
SODIUM SERPL-SCNC: 140 MMOL/L (ref 136–145)

## 2019-12-05 PROCEDURE — 80048 BASIC METABOLIC PNL TOTAL CA: CPT | Performed by: INTERNAL MEDICINE

## 2019-12-05 PROCEDURE — 96366 THER/PROPH/DIAG IV INF ADDON: CPT

## 2019-12-05 PROCEDURE — 96365 THER/PROPH/DIAG IV INF INIT: CPT

## 2019-12-05 RX ORDER — SODIUM CHLORIDE 9 MG/ML
20 INJECTION, SOLUTION INTRAVENOUS CONTINUOUS
Status: DISCONTINUED | OUTPATIENT
Start: 2019-12-05 | End: 2019-12-08 | Stop reason: HOSPADM

## 2019-12-05 RX ORDER — SODIUM CHLORIDE 9 MG/ML
20 INJECTION, SOLUTION INTRAVENOUS CONTINUOUS
Status: CANCELLED
Start: 2019-12-09

## 2019-12-05 RX ADMIN — POTASSIUM CHLORIDE: 2 INJECTION, SOLUTION, CONCENTRATE INTRAVENOUS at 09:16

## 2019-12-05 NOTE — PROGRESS NOTES
Infusion stopped with 51minutes left due to patient doesn't have   Patient left in stable condition

## 2019-12-09 ENCOUNTER — HOSPITAL ENCOUNTER (OUTPATIENT)
Dept: INFUSION CENTER | Facility: HOSPITAL | Age: 34
Discharge: HOME | End: 2019-12-09
Payer: COMMERCIAL

## 2019-12-09 VITALS
HEART RATE: 100 BPM | DIASTOLIC BLOOD PRESSURE: 56 MMHG | RESPIRATION RATE: 18 BRPM | TEMPERATURE: 98.6 F | SYSTOLIC BLOOD PRESSURE: 130 MMHG | OXYGEN SATURATION: 98 %

## 2019-12-09 DIAGNOSIS — E87.6 HYPOKALEMIA: Primary | ICD-10-CM

## 2019-12-09 LAB
ANION GAP SERPL CALCULATED.3IONS-SCNC: 11 MMOL/L (ref 4–13)
BUN SERPL-MCNC: 15 MG/DL (ref 5–25)
CALCIUM SERPL-MCNC: 8.5 MG/DL (ref 8.3–10.1)
CHLORIDE SERPL-SCNC: 103 MMOL/L (ref 100–108)
CO2 SERPL-SCNC: 23 MMOL/L (ref 21–32)
CREAT SERPL-MCNC: 0.83 MG/DL (ref 0.6–1.3)
GFR SERPL CREATININE-BSD FRML MDRD: 92 ML/MIN/1.73SQ M
GLUCOSE SERPL-MCNC: 82 MG/DL (ref 65–140)
POTASSIUM SERPL-SCNC: 3.4 MMOL/L (ref 3.5–5.3)
SODIUM SERPL-SCNC: 137 MMOL/L (ref 136–145)

## 2019-12-09 PROCEDURE — 96365 THER/PROPH/DIAG IV INF INIT: CPT

## 2019-12-09 PROCEDURE — 80048 BASIC METABOLIC PNL TOTAL CA: CPT | Performed by: INTERNAL MEDICINE

## 2019-12-09 PROCEDURE — 96366 THER/PROPH/DIAG IV INF ADDON: CPT

## 2019-12-09 RX ORDER — SODIUM CHLORIDE 9 MG/ML
20 INJECTION, SOLUTION INTRAVENOUS CONTINUOUS
Status: CANCELLED
Start: 2019-12-12

## 2019-12-09 RX ORDER — SODIUM CHLORIDE 9 MG/ML
20 INJECTION, SOLUTION INTRAVENOUS CONTINUOUS
Status: DISCONTINUED | OUTPATIENT
Start: 2019-12-09 | End: 2019-12-12 | Stop reason: HOSPADM

## 2019-12-09 RX ADMIN — POTASSIUM CHLORIDE: 2 INJECTION, SOLUTION, CONCENTRATE INTRAVENOUS at 09:52

## 2019-12-09 NOTE — PROGRESS NOTES
Pt unable to stay to complete infusion  Needs to  her grandchildren  Tolerated 363 ml out of 510 ml infusion of 60 mEq potassium  Discharged in satisfactory condition  Next appointment 12/12/19

## 2019-12-09 NOTE — PROGRESS NOTES
Pt arrived on unit at 0900  States she feels a little tired  Labs drawn from port  Potassium level 3 4 today  Per order parameters pt to get 60 mEq potassium today  Okay to proceed

## 2019-12-12 ENCOUNTER — HOSPITAL ENCOUNTER (OUTPATIENT)
Dept: INFUSION CENTER | Facility: HOSPITAL | Age: 34
Discharge: HOME | End: 2019-12-12
Payer: COMMERCIAL

## 2019-12-12 VITALS
DIASTOLIC BLOOD PRESSURE: 55 MMHG | TEMPERATURE: 98 F | SYSTOLIC BLOOD PRESSURE: 114 MMHG | RESPIRATION RATE: 16 BRPM | HEART RATE: 87 BPM

## 2019-12-12 DIAGNOSIS — E87.6 HYPOKALEMIA: Primary | ICD-10-CM

## 2019-12-12 LAB
ANION GAP SERPL CALCULATED.3IONS-SCNC: 9 MMOL/L (ref 4–13)
BUN SERPL-MCNC: 15 MG/DL (ref 5–25)
CALCIUM SERPL-MCNC: 8.2 MG/DL (ref 8.3–10.1)
CHLORIDE SERPL-SCNC: 105 MMOL/L (ref 100–108)
CO2 SERPL-SCNC: 24 MMOL/L (ref 21–32)
CREAT SERPL-MCNC: 1 MG/DL (ref 0.6–1.3)
GFR SERPL CREATININE-BSD FRML MDRD: 73 ML/MIN/1.73SQ M
GLUCOSE SERPL-MCNC: 82 MG/DL (ref 65–140)
POTASSIUM SERPL-SCNC: 3.2 MMOL/L (ref 3.5–5.3)
SODIUM SERPL-SCNC: 138 MMOL/L (ref 136–145)

## 2019-12-12 PROCEDURE — 80048 BASIC METABOLIC PNL TOTAL CA: CPT | Performed by: INTERNAL MEDICINE

## 2019-12-12 PROCEDURE — 96365 THER/PROPH/DIAG IV INF INIT: CPT

## 2019-12-12 PROCEDURE — 96366 THER/PROPH/DIAG IV INF ADDON: CPT

## 2019-12-12 RX ORDER — SODIUM CHLORIDE 9 MG/ML
20 INJECTION, SOLUTION INTRAVENOUS CONTINUOUS
Status: DISCONTINUED | OUTPATIENT
Start: 2019-12-12 | End: 2019-12-15 | Stop reason: HOSPADM

## 2019-12-12 RX ORDER — SODIUM CHLORIDE 9 MG/ML
20 INJECTION, SOLUTION INTRAVENOUS CONTINUOUS
Status: CANCELLED
Start: 2019-12-16

## 2019-12-12 RX ADMIN — SODIUM CHLORIDE 20 ML/HR: 0.9 INJECTION, SOLUTION INTRAVENOUS at 09:40

## 2019-12-12 RX ADMIN — POTASSIUM CHLORIDE: 2 INJECTION, SOLUTION, CONCENTRATE INTRAVENOUS at 10:17

## 2019-12-12 NOTE — PLAN OF CARE
Problem: INFECTION - ADULT  Goal: Absence or prevention of progression during hospitalization  Description  INTERVENTIONS:  - Assess and monitor for signs and symptoms of infection  - Monitor lab/diagnostic results  - Monitor all insertion sites, i e  indwelling lines, tubes, and drains  - Monitor endotracheal if appropriate and nasal secretions for changes in amount and color  - [place] appropriate cooling/warming therapies per order  - Administer medications as ordered  - Instruct and encourage patient and family to use good hand hygiene technique  - Identify and instruct in appropriate isolation precautions for identified infection/condition  Outcome: Progressing  Goal: Absence of fever/infection during neutropenic period  Description  INTERVENTIONS:  - Monitor WBC    Outcome: Progressing

## 2019-12-12 NOTE — PROGRESS NOTES
[facility] and rechecked blood return and received positive blood return

## 2019-12-12 NOTE — PROGRESS NOTES
Patient needed to leave for personal reasons she refused AVS and she tolerated infusion well she was d/c from unit in stable condition

## 2019-12-12 NOTE — PROGRESS NOTES
Patient can only stay for 4 hours so she is requesting 40 MEQ sugar though her potassium of 3 2 is for 60 MEQ

## 2019-12-16 ENCOUNTER — HOSPITAL ENCOUNTER (OUTPATIENT)
Dept: INFUSION CENTER | Facility: HOSPITAL | Age: 34
Discharge: HOME | End: 2019-12-16
Payer: COMMERCIAL

## 2019-12-16 VITALS
SYSTOLIC BLOOD PRESSURE: 117 MMHG | RESPIRATION RATE: 18 BRPM | HEART RATE: 86 BPM | DIASTOLIC BLOOD PRESSURE: 57 MMHG | TEMPERATURE: 97.2 F | OXYGEN SATURATION: 100 %

## 2019-12-16 DIAGNOSIS — E87.6 HYPOKALEMIA: Primary | ICD-10-CM

## 2019-12-16 LAB
ANION GAP SERPL CALCULATED.3IONS-SCNC: 8 MMOL/L (ref 4–13)
BUN SERPL-MCNC: 13 MG/DL (ref 5–25)
CALCIUM SERPL-MCNC: 8.1 MG/DL (ref 8.3–10.1)
CHLORIDE SERPL-SCNC: 108 MMOL/L (ref 100–108)
CO2 SERPL-SCNC: 22 MMOL/L (ref 21–32)
CREAT SERPL-MCNC: 0.85 MG/DL (ref 0.6–1.3)
GFR SERPL CREATININE-BSD FRML MDRD: 89 ML/MIN/1.73SQ M
GLUCOSE SERPL-MCNC: 66 MG/DL (ref 65–140)
POTASSIUM SERPL-SCNC: 4.1 MMOL/L (ref 3.5–5.3)
SODIUM SERPL-SCNC: 138 MMOL/L (ref 136–145)

## 2019-12-16 PROCEDURE — 96366 THER/PROPH/DIAG IV INF ADDON: CPT

## 2019-12-16 PROCEDURE — 80048 BASIC METABOLIC PNL TOTAL CA: CPT | Performed by: INTERNAL MEDICINE

## 2019-12-16 PROCEDURE — 96365 THER/PROPH/DIAG IV INF INIT: CPT

## 2019-12-16 RX ORDER — POTASSIUM CHLORIDE 14.9 MG/ML
20 INJECTION INTRAVENOUS
Status: COMPLETED | OUTPATIENT
Start: 2019-12-16 | End: 2019-12-16

## 2019-12-16 RX ORDER — POTASSIUM CHLORIDE 14.9 MG/ML
20 INJECTION INTRAVENOUS
Status: DISCONTINUED | OUTPATIENT
Start: 2019-12-16 | End: 2019-12-16

## 2019-12-16 RX ORDER — SODIUM CHLORIDE 9 MG/ML
20 INJECTION, SOLUTION INTRAVENOUS CONTINUOUS
Status: DISCONTINUED | OUTPATIENT
Start: 2019-12-16 | End: 2019-12-19 | Stop reason: HOSPADM

## 2019-12-16 RX ORDER — SODIUM CHLORIDE 9 MG/ML
20 INJECTION, SOLUTION INTRAVENOUS CONTINUOUS
Status: CANCELLED
Start: 2019-12-19

## 2019-12-16 RX ADMIN — SODIUM CHLORIDE 20 ML/HR: 0.9 INJECTION, SOLUTION INTRAVENOUS at 10:23

## 2019-12-16 RX ADMIN — SODIUM CHLORIDE 500 ML: 0.9 INJECTION, SOLUTION INTRAVENOUS at 10:23

## 2019-12-16 RX ADMIN — POTASSIUM CHLORIDE 20 MEQ: 14.9 INJECTION, SOLUTION INTRAVENOUS at 12:20

## 2019-12-16 RX ADMIN — POTASSIUM CHLORIDE 20 MEQ: 14.9 INJECTION, SOLUTION INTRAVENOUS at 10:20

## 2019-12-18 ENCOUNTER — HOSPITAL ENCOUNTER (OUTPATIENT)
Dept: INFUSION CENTER | Facility: HOSPITAL | Age: 34
Discharge: HOME | End: 2019-12-18
Payer: COMMERCIAL

## 2019-12-18 VITALS
SYSTOLIC BLOOD PRESSURE: 121 MMHG | RESPIRATION RATE: 16 BRPM | DIASTOLIC BLOOD PRESSURE: 56 MMHG | TEMPERATURE: 97.2 F | HEART RATE: 88 BPM

## 2019-12-18 DIAGNOSIS — E87.6 HYPOKALEMIA: Primary | ICD-10-CM

## 2019-12-18 PROCEDURE — 96366 THER/PROPH/DIAG IV INF ADDON: CPT

## 2019-12-18 PROCEDURE — 96365 THER/PROPH/DIAG IV INF INIT: CPT

## 2019-12-18 RX ORDER — POTASSIUM CHLORIDE 29.8 MG/ML
40 INJECTION INTRAVENOUS ONCE
Status: CANCELLED | OUTPATIENT
Start: 2019-12-18

## 2019-12-18 RX ORDER — POTASSIUM CHLORIDE 29.8 MG/ML
40 INJECTION INTRAVENOUS ONCE
Status: DISCONTINUED | OUTPATIENT
Start: 2019-12-18 | End: 2019-12-18

## 2019-12-18 RX ADMIN — POTASSIUM CHLORIDE: 2 INJECTION, SOLUTION, CONCENTRATE INTRAVENOUS at 08:34

## 2019-12-18 NOTE — PROGRESS NOTES
Left patient accessed she is for treatment tomorrow at 0830  She tolerated infusion well she was d/c from unit in stable condition

## 2019-12-18 NOTE — PLAN OF CARE
Problem: Potential for Falls  Goal: Patient will remain free of falls  Description  INTERVENTIONS:  - Assess patient frequently for physical needs  -  Identify cognitive and physical deficits and behaviors that affect risk of falls    -  [place] fall precautions as indicated by assessment   - Educate patient/family on patient safety including physical limitations  - Instruct patient to call for assistance with activity based on assessment  - Modify environment to reduce risk of injury  - Consider OT/PT consult to assist with strengthening/mobility  Outcome: Progressing

## 2019-12-19 ENCOUNTER — HOSPITAL ENCOUNTER (OUTPATIENT)
Dept: INFUSION CENTER | Facility: HOSPITAL | Age: 34
Discharge: HOME | End: 2019-12-19
Payer: COMMERCIAL

## 2019-12-19 VITALS
SYSTOLIC BLOOD PRESSURE: 114 MMHG | DIASTOLIC BLOOD PRESSURE: 56 MMHG | RESPIRATION RATE: 18 BRPM | OXYGEN SATURATION: 100 % | HEART RATE: 82 BPM | TEMPERATURE: 97.8 F

## 2019-12-19 DIAGNOSIS — E87.6 HYPOKALEMIA: Primary | ICD-10-CM

## 2019-12-19 LAB
ANION GAP SERPL CALCULATED.3IONS-SCNC: 11 MMOL/L (ref 4–13)
BUN SERPL-MCNC: 11 MG/DL (ref 5–25)
CALCIUM SERPL-MCNC: 8.2 MG/DL (ref 8.3–10.1)
CHLORIDE SERPL-SCNC: 108 MMOL/L (ref 100–108)
CO2 SERPL-SCNC: 21 MMOL/L (ref 21–32)
CREAT SERPL-MCNC: 0.92 MG/DL (ref 0.6–1.3)
GFR SERPL CREATININE-BSD FRML MDRD: 81 ML/MIN/1.73SQ M
GLUCOSE SERPL-MCNC: 79 MG/DL (ref 65–140)
POTASSIUM SERPL-SCNC: 3.8 MMOL/L (ref 3.5–5.3)
SODIUM SERPL-SCNC: 140 MMOL/L (ref 136–145)

## 2019-12-19 PROCEDURE — 96365 THER/PROPH/DIAG IV INF INIT: CPT

## 2019-12-19 PROCEDURE — 96366 THER/PROPH/DIAG IV INF ADDON: CPT

## 2019-12-19 PROCEDURE — 80048 BASIC METABOLIC PNL TOTAL CA: CPT | Performed by: INTERNAL MEDICINE

## 2019-12-19 RX ORDER — SODIUM CHLORIDE 9 MG/ML
20 INJECTION, SOLUTION INTRAVENOUS CONTINUOUS
Status: DISCONTINUED | OUTPATIENT
Start: 2019-12-19 | End: 2019-12-22 | Stop reason: HOSPADM

## 2019-12-19 RX ORDER — SODIUM CHLORIDE 9 MG/ML
20 INJECTION, SOLUTION INTRAVENOUS CONTINUOUS
Status: CANCELLED
Start: 2019-12-23

## 2019-12-19 RX ADMIN — POTASSIUM CHLORIDE: 2 INJECTION, SOLUTION, CONCENTRATE INTRAVENOUS at 09:30

## 2019-12-19 RX ADMIN — SODIUM CHLORIDE 20 ML/HR: 0.9 INJECTION, SOLUTION INTRAVENOUS at 09:29

## 2019-12-19 NOTE — PROGRESS NOTES
1500 Patient tolerated infusion well  She could not stay for all of infusion but was able to stay for approximately five hours and thirty minutes due to  issues   She was discharged in stable condition

## 2019-12-19 NOTE — PLAN OF CARE
Problem: Potential for Falls  Goal: Patient will remain free of falls  Description  INTERVENTIONS:  - Assess patient frequently for physical needs  -  Identify cognitive and physical deficits and behaviors that affect risk of falls  -  [facility] fall precautions as indicated by assessment   - Educate patient/family on patient safety including physical limitations  - Instruct patient to call for assistance with activity based on assessment  - Modify environment to reduce risk of injury  - Consider OT/PT consult to assist with strengthening/mobility  Outcome: Progressing     Problem: INFECTION - ADULT  Goal: Absence or prevention of progression during hospitalization  Description  INTERVENTIONS:  - Assess and monitor for signs and symptoms of infection  - Monitor lab/diagnostic results  - Monitor all insertion sites, i e  indwelling lines, tubes, and drains  - Monitor endotracheal if appropriate and nasal secretions for changes in amount and color  - [facility] appropriate cooling/warming therapies per order  - Administer medications as ordered  - Instruct and encourage patient and family to use good hand hygiene technique  - Identify and instruct in appropriate isolation precautions for identified infection/condition  Outcome: Progressing     Problem: Knowledge Deficit  Goal: Patient/family/caregiver demonstrates understanding of disease process, treatment plan, medications, and discharge instructions  Description  Complete learning assessment and assess knowledge base    Interventions:  - Provide teaching at level of understanding  - Provide teaching via preferred learning methods  Outcome: Progressing

## 2019-12-23 ENCOUNTER — HOSPITAL ENCOUNTER (OUTPATIENT)
Dept: INFUSION CENTER | Facility: HOSPITAL | Age: 34
Discharge: HOME | End: 2019-12-23
Payer: COMMERCIAL

## 2019-12-23 VITALS
HEART RATE: 76 BPM | DIASTOLIC BLOOD PRESSURE: 57 MMHG | RESPIRATION RATE: 76 BRPM | SYSTOLIC BLOOD PRESSURE: 115 MMHG | TEMPERATURE: 97.4 F | OXYGEN SATURATION: 99 %

## 2019-12-23 DIAGNOSIS — E87.6 HYPOKALEMIA: Primary | ICD-10-CM

## 2019-12-23 LAB
ANION GAP SERPL CALCULATED.3IONS-SCNC: 14 MMOL/L (ref 4–13)
BUN SERPL-MCNC: 15 MG/DL (ref 5–25)
CALCIUM SERPL-MCNC: 8.4 MG/DL (ref 8.3–10.1)
CHLORIDE SERPL-SCNC: 104 MMOL/L (ref 100–108)
CO2 SERPL-SCNC: 20 MMOL/L (ref 21–32)
CREAT SERPL-MCNC: 0.9 MG/DL (ref 0.6–1.3)
GFR SERPL CREATININE-BSD FRML MDRD: 83 ML/MIN/1.73SQ M
GLUCOSE SERPL-MCNC: 74 MG/DL (ref 65–140)
POTASSIUM SERPL-SCNC: 3.3 MMOL/L (ref 3.5–5.3)
SODIUM SERPL-SCNC: 138 MMOL/L (ref 136–145)

## 2019-12-23 PROCEDURE — 96365 THER/PROPH/DIAG IV INF INIT: CPT

## 2019-12-23 PROCEDURE — 96366 THER/PROPH/DIAG IV INF ADDON: CPT

## 2019-12-23 PROCEDURE — 80048 BASIC METABOLIC PNL TOTAL CA: CPT | Performed by: INTERNAL MEDICINE

## 2019-12-23 RX ORDER — SODIUM CHLORIDE 9 MG/ML
20 INJECTION, SOLUTION INTRAVENOUS CONTINUOUS
Status: DISCONTINUED | OUTPATIENT
Start: 2019-12-23 | End: 2019-12-26 | Stop reason: HOSPADM

## 2019-12-23 RX ORDER — SODIUM CHLORIDE 9 MG/ML
20 INJECTION, SOLUTION INTRAVENOUS CONTINUOUS
Status: CANCELLED
Start: 2019-12-26

## 2019-12-23 RX ADMIN — POTASSIUM CHLORIDE: 2 INJECTION, SOLUTION, CONCENTRATE INTRAVENOUS at 08:48

## 2019-12-29 ENCOUNTER — HOSPITAL ENCOUNTER (OUTPATIENT)
Facility: HOSPITAL | Age: 34
Setting detail: OBSERVATION
Discharge: HOME | End: 2019-12-30
Attending: EMERGENCY MEDICINE | Admitting: INTERNAL MEDICINE
Payer: COMMERCIAL

## 2019-12-29 DIAGNOSIS — E87.6 HYPOKALEMIA: Primary | ICD-10-CM

## 2019-12-29 LAB
ALBUMIN SERPL BCP-MCNC: 4.1 G/DL (ref 3.5–5)
ALP SERPL-CCNC: 53 U/L (ref 46–116)
ALT SERPL W P-5'-P-CCNC: 23 U/L (ref 12–78)
ANION GAP SERPL CALCULATED.3IONS-SCNC: 13 MMOL/L (ref 4–13)
AST SERPL W P-5'-P-CCNC: 25 U/L (ref 5–45)
BASOPHILS # BLD AUTO: 0.05 THOUSANDS/ΜL (ref 0–0.1)
BASOPHILS NFR BLD AUTO: 1 % (ref 0–1)
BILIRUB SERPL-MCNC: 0.4 MG/DL (ref 0.2–1)
BUN SERPL-MCNC: 17 MG/DL (ref 5–25)
CALCIUM SERPL-MCNC: 8.6 MG/DL (ref 8.3–10.1)
CHLORIDE SERPL-SCNC: 101 MMOL/L (ref 100–108)
CO2 SERPL-SCNC: 26 MMOL/L (ref 21–32)
CREAT SERPL-MCNC: 1.16 MG/DL (ref 0.6–1.3)
EOSINOPHIL # BLD AUTO: 0.15 THOUSAND/ΜL (ref 0–0.61)
EOSINOPHIL NFR BLD AUTO: 2 % (ref 0–6)
ERYTHROCYTE [DISTWIDTH] IN BLOOD BY AUTOMATED COUNT: 13.8 % (ref 11.6–15.1)
GFR SERPL CREATININE-BSD FRML MDRD: 61 ML/MIN/1.73SQ M
GLUCOSE SERPL-MCNC: 99 MG/DL (ref 65–140)
HCT VFR BLD AUTO: 44.3 % (ref 34.8–46.1)
HGB BLD-MCNC: 14.2 G/DL (ref 11.5–15.4)
HOLD SPECIMEN: NORMAL
HOLD SPECIMEN: NORMAL
IMM GRANULOCYTES # BLD AUTO: 0.02 THOUSAND/UL (ref 0–0.2)
IMM GRANULOCYTES NFR BLD AUTO: 0 % (ref 0–2)
LYMPHOCYTES # BLD AUTO: 1.96 THOUSANDS/ΜL (ref 0.6–4.47)
LYMPHOCYTES NFR BLD AUTO: 28 % (ref 14–44)
MAGNESIUM SERPL-MCNC: 2.3 MG/DL (ref 1.6–2.6)
MCH RBC QN AUTO: 29.7 PG (ref 26.8–34.3)
MCHC RBC AUTO-ENTMCNC: 32.1 G/DL (ref 31.4–37.4)
MCV RBC AUTO: 93 FL (ref 82–98)
MONOCYTES # BLD AUTO: 0.56 THOUSAND/ΜL (ref 0.17–1.22)
MONOCYTES NFR BLD AUTO: 8 % (ref 4–12)
NEUTROPHILS # BLD AUTO: 4.3 THOUSANDS/ΜL (ref 1.85–7.62)
NEUTS SEG NFR BLD AUTO: 61 % (ref 43–75)
NRBC BLD AUTO-RTO: 0 /100 WBCS
PLATELET # BLD AUTO: 295 THOUSANDS/UL (ref 149–390)
PMV BLD AUTO: 10.8 FL (ref 8.9–12.7)
POTASSIUM SERPL-SCNC: 2.2 MMOL/L (ref 3.5–5.3)
PROT SERPL-MCNC: 7.9 G/DL (ref 6.4–8.2)
RBC # BLD AUTO: 4.78 MILLION/UL (ref 3.81–5.12)
SODIUM SERPL-SCNC: 140 MMOL/L (ref 136–145)
TROPONIN I SERPL-MCNC: <0.02 NG/ML
TSH SERPL DL<=0.05 MIU/L-ACNC: 3.63 UIU/ML (ref 0.36–3.74)
WBC # BLD AUTO: 7.04 THOUSAND/UL (ref 4.31–10.16)

## 2019-12-29 PROCEDURE — 83735 ASSAY OF MAGNESIUM: CPT | Performed by: PHYSICIAN ASSISTANT

## 2019-12-29 PROCEDURE — 80053 COMPREHEN METABOLIC PANEL: CPT | Performed by: EMERGENCY MEDICINE

## 2019-12-29 PROCEDURE — 99285 EMERGENCY DEPT VISIT HI MDM: CPT | Performed by: PHYSICIAN ASSISTANT

## 2019-12-29 PROCEDURE — 36415 COLL VENOUS BLD VENIPUNCTURE: CPT | Performed by: EMERGENCY MEDICINE

## 2019-12-29 PROCEDURE — 93005 ELECTROCARDIOGRAM TRACING: CPT

## 2019-12-29 PROCEDURE — 99219 PR INITIAL OBSERVATION CARE/DAY 50 MINUTES: CPT | Performed by: FAMILY MEDICINE

## 2019-12-29 PROCEDURE — 85025 COMPLETE CBC W/AUTO DIFF WBC: CPT | Performed by: EMERGENCY MEDICINE

## 2019-12-29 PROCEDURE — 84484 ASSAY OF TROPONIN QUANT: CPT | Performed by: EMERGENCY MEDICINE

## 2019-12-29 PROCEDURE — 84443 ASSAY THYROID STIM HORMONE: CPT | Performed by: PHYSICIAN ASSISTANT

## 2019-12-29 PROCEDURE — 99285 EMERGENCY DEPT VISIT HI MDM: CPT

## 2019-12-29 RX ORDER — AMITRIPTYLINE HYDROCHLORIDE 50 MG/1
50 TABLET, FILM COATED ORAL
Status: DISCONTINUED | OUTPATIENT
Start: 2019-12-29 | End: 2019-12-30 | Stop reason: HOSPADM

## 2019-12-29 RX ORDER — ZINC SULFATE 50(220)MG
220 CAPSULE ORAL DAILY
Status: DISCONTINUED | OUTPATIENT
Start: 2019-12-30 | End: 2019-12-30 | Stop reason: HOSPADM

## 2019-12-29 RX ORDER — POTASSIUM CHLORIDE 20MEQ/15ML
40 LIQUID (ML) ORAL
Status: DISCONTINUED | OUTPATIENT
Start: 2019-12-29 | End: 2019-12-30 | Stop reason: HOSPADM

## 2019-12-29 RX ORDER — THIAMINE MONONITRATE (VIT B1) 100 MG
100 TABLET ORAL DAILY
Status: DISCONTINUED | OUTPATIENT
Start: 2019-12-30 | End: 2019-12-30 | Stop reason: HOSPADM

## 2019-12-29 RX ORDER — POTASSIUM CHLORIDE 14.9 MG/ML
20 INJECTION INTRAVENOUS
Status: COMPLETED | OUTPATIENT
Start: 2019-12-29 | End: 2019-12-30

## 2019-12-29 RX ORDER — CHOLECALCIFEROL (VITAMIN D3) 10 MCG
1 TABLET ORAL
Status: DISCONTINUED | OUTPATIENT
Start: 2019-12-30 | End: 2019-12-30 | Stop reason: HOSPADM

## 2019-12-29 RX ORDER — TOPIRAMATE 100 MG/1
100 TABLET, FILM COATED ORAL 2 TIMES DAILY
Status: DISCONTINUED | OUTPATIENT
Start: 2019-12-29 | End: 2019-12-30 | Stop reason: HOSPADM

## 2019-12-29 RX ORDER — POTASSIUM CHLORIDE 20MEQ/15ML
40 LIQUID (ML) ORAL ONCE
Status: COMPLETED | OUTPATIENT
Start: 2019-12-29 | End: 2019-12-29

## 2019-12-29 RX ORDER — MECLIZINE HYDROCHLORIDE 25 MG/1
25 TABLET ORAL
Status: DISCONTINUED | OUTPATIENT
Start: 2019-12-29 | End: 2019-12-29

## 2019-12-29 RX ORDER — MECLIZINE HYDROCHLORIDE 25 MG/1
25 TABLET ORAL EVERY 8 HOURS PRN
Status: DISCONTINUED | OUTPATIENT
Start: 2019-12-29 | End: 2019-12-30 | Stop reason: HOSPADM

## 2019-12-29 RX ORDER — ONDANSETRON 2 MG/ML
4 INJECTION INTRAMUSCULAR; INTRAVENOUS EVERY 6 HOURS PRN
Status: DISCONTINUED | OUTPATIENT
Start: 2019-12-29 | End: 2019-12-30 | Stop reason: HOSPADM

## 2019-12-29 RX ORDER — ERGOCALCIFEROL 1.25 MG/1
50000 CAPSULE ORAL WEEKLY
Status: DISCONTINUED | OUTPATIENT
Start: 2019-12-30 | End: 2019-12-30 | Stop reason: HOSPADM

## 2019-12-29 RX ORDER — MAGNESIUM HYDROXIDE/ALUMINUM HYDROXICE/SIMETHICONE 120; 1200; 1200 MG/30ML; MG/30ML; MG/30ML
30 SUSPENSION ORAL EVERY 6 HOURS PRN
Status: DISCONTINUED | OUTPATIENT
Start: 2019-12-29 | End: 2019-12-30 | Stop reason: HOSPADM

## 2019-12-29 RX ORDER — POTASSIUM CHLORIDE 14.9 MG/ML
20 INJECTION INTRAVENOUS
Status: DISCONTINUED | OUTPATIENT
Start: 2019-12-29 | End: 2019-12-29

## 2019-12-29 RX ORDER — AMILORIDE HYDROCHLORIDE 5 MG/1
5 TABLET ORAL DAILY
Status: DISCONTINUED | OUTPATIENT
Start: 2019-12-30 | End: 2019-12-30 | Stop reason: HOSPADM

## 2019-12-29 RX ORDER — DOCUSATE SODIUM 100 MG/1
100 CAPSULE, LIQUID FILLED ORAL 2 TIMES DAILY PRN
Status: DISCONTINUED | OUTPATIENT
Start: 2019-12-29 | End: 2019-12-30 | Stop reason: HOSPADM

## 2019-12-29 RX ORDER — FERROUS SULFATE 325(65) MG
325 TABLET ORAL EVERY OTHER DAY
Status: DISCONTINUED | OUTPATIENT
Start: 2019-12-29 | End: 2019-12-30 | Stop reason: HOSPADM

## 2019-12-29 RX ORDER — SUMATRIPTAN 25 MG/1
100 TABLET, FILM COATED ORAL ONCE AS NEEDED
Status: DISCONTINUED | OUTPATIENT
Start: 2019-12-29 | End: 2019-12-30 | Stop reason: HOSPADM

## 2019-12-29 RX ADMIN — FERROUS SULFATE TAB 325 MG (65 MG ELEMENTAL FE) 325 MG: 325 (65 FE) TAB at 20:23

## 2019-12-29 RX ADMIN — SODIUM CHLORIDE 1000 ML: 0.9 INJECTION, SOLUTION INTRAVENOUS at 18:23

## 2019-12-29 RX ADMIN — POTASSIUM CHLORIDE 40 MEQ: 20 SOLUTION ORAL at 18:15

## 2019-12-29 RX ADMIN — POTASSIUM CHLORIDE 20 MEQ: 14.9 INJECTION, SOLUTION INTRAVENOUS at 20:23

## 2019-12-29 RX ADMIN — AMITRIPTYLINE HYDROCHLORIDE 50 MG: 50 TABLET, FILM COATED ORAL at 22:32

## 2019-12-29 RX ADMIN — POTASSIUM CHLORIDE 40 MEQ: 20 SOLUTION ORAL at 22:31

## 2019-12-29 RX ADMIN — POTASSIUM CHLORIDE 20 MEQ: 14.9 INJECTION, SOLUTION INTRAVENOUS at 18:23

## 2019-12-29 RX ADMIN — POTASSIUM CHLORIDE 20 MEQ: 14.9 INJECTION, SOLUTION INTRAVENOUS at 22:32

## 2019-12-29 RX ADMIN — TOPIRAMATE 100 MG: 100 TABLET, FILM COATED ORAL at 20:23

## 2019-12-29 NOTE — ED PROVIDER NOTES
History  Chief Complaint   Patient presents with    Weakness - Generalized     pt regularly gets potassium transfusions and reccently came back from vacation, pt believes her potassium is low again and she has had intermittent numbness in face and hands which typically occurs when her potassium is low     27year old female presents with spouse ambulatory from home for evaluation of weakness  Pt notes h/o low potassium  She has a significant history of hypokalemia of unknown etiology  She has had extensive work up at [facility] per pt report  She now follows with nephrology and gets biweekly infusions  She states her last infusion was Monday 12/23 due to the holiday and a vacation  She had increased infusions prior to this but she notes there was still concern that her potassium would be low until next scheduled infusion  It has been six days since her last infusion and she feels potassium is too low  C/o weakness/fatigue  She also notes numbness intermittently in face and hands which usually occurs for her when her potassium is low  Denies fever, chills, cough, congestion or recent illness  Denies chest pain, SOB, N/V/D, abdominal pain  She has been taking her home potassium solution daily as prescribed (reports 200 mEQ daily)  She does indicate she doesn't tolerate the oral pills and they just "hang around in my stomach and don't dissolve"  History provided by:  Patient and medical records   used: No        Prior to Admission Medications   Prescriptions Last Dose Informant Patient Reported? Taking?    AMILoride 5 mg tablet  Self No No   Sig: Take 1 tablet (5 mg total) by mouth daily   SUMAtriptan (IMITREX) 100 mg tablet   Yes No   Sig: Take 100 mg by mouth daily at bedtime    amitriptyline (ELAVIL) 50 mg tablet   Yes No   Sig: Take 50 mg by mouth daily at bedtime   b complex-C-folic acid (NEPHRO-EDIE) 0 8 mg tablet   Yes No   Sig: Take 0 8 mg by mouth daily with dinner ergocalciferol (ERGOCALCIFEROL) 16792 units capsule   Yes No   Sig: Take 50,000 Units by mouth once a week Patient takes this med on Mondays    ferrous sulfate 325 (65 Fe) mg tablet   Yes No   Sig: Take 325 mg by mouth every other day    meclizine (ANTIVERT) 25 mg tablet   Yes No   Sig: Take 25 mg by mouth daily at bedtime   potassium chloride 10 % oral solution   No No   Sig: Take 30 mL (40 mEq total) by mouth 5 (five) times a day   thiamine 100 MG tablet   Yes No   Sig: Take 100 mg by mouth daily   topiramate (TOPAMAX) 100 mg tablet   Yes No   Sig: Take 100 mg by mouth 2 (two) times a day   zinc sulfate (ZINCATE) 220 mg capsule   No No   Sig: Take 1 capsule (220 mg total) by mouth daily      Facility-Administered Medications: None       Past Medical History:   Diagnosis Date    H  pylori infection     Hypokalemia     Migraine     Rhabdomyolysis        Past Surgical History:   Procedure Laterality Date    ABDOMINAL SURGERY      APPENDECTOMY      CHOLECYSTECTOMY      COSMETIC SURGERY      FL GUIDED CENTRAL VENOUS ACCESS DEVICE INSERTION  12/21/2018    GASTRIC BYPASS      HYSTERECTOMY      TUNNELED VENOUS PORT PLACEMENT N/A 12/21/2018    Procedure: INSERTION VENOUS PORT (PORT-A-CATH); Surgeon: Gauri Dye DO;  Location: [facility];  Service: General       Family History   Problem Relation Age of Onset    Hypertension Father     Diabetes Father     Diabetes Maternal Grandfather      I have reviewed and agree with the history as documented  Social History     Tobacco Use    Smoking status: Never Smoker    Smokeless tobacco: Never Used   Substance Use Topics    Alcohol use: Never     Alcohol/week: 0 0 standard drinks     Frequency: Never     Drinks per session: Patient refused     Binge frequency: Never     Comment: 0    Drug use: No        Review of Systems   Constitutional: Positive for fatigue  Negative for chills and fever  HENT: Negative    Negative for congestion, rhinorrhea and sore throat  Eyes: Negative  Negative for visual disturbance  Respiratory: Negative  Negative for cough, shortness of breath and wheezing  Cardiovascular: Negative  Negative for chest pain, palpitations and leg swelling  Gastrointestinal: Negative  Negative for abdominal pain, constipation, diarrhea, nausea and vomiting  Genitourinary: Negative  Negative for dysuria, flank pain, frequency and hematuria  Musculoskeletal: Negative  Negative for back pain and myalgias  Skin: Negative  Negative for rash  Neurological: Positive for numbness  Negative for dizziness, light-headedness and headaches  Psychiatric/Behavioral: Negative  Negative for confusion  All other systems reviewed and are negative  Physical Exam  Physical Exam   Constitutional: She is oriented to person, place, and time  She appears well-developed and well-nourished  No distress  HENT:   Head: Normocephalic and atraumatic  Right Ear: Hearing, tympanic membrane, external ear and ear canal normal    Left Ear: Hearing, tympanic membrane, external ear and ear canal normal    Nose: Nose normal    Mouth/Throat: Uvula is midline, oropharynx is clear and moist and mucous membranes are normal  No oropharyngeal exudate  Eyes: Pupils are equal, round, and reactive to light  Conjunctivae and EOM are normal  No scleral icterus  Neck: Trachea normal and normal range of motion  Neck supple  No tracheal deviation present  Cardiovascular: Normal rate, regular rhythm, normal heart sounds, intact distal pulses and normal pulses  No murmur heard  Pulmonary/Chest: Effort normal and breath sounds normal  No respiratory distress  She has no wheezes  She has no rhonchi  She has no rales  Abdominal: Soft  Bowel sounds are normal  There is no tenderness  There is no rebound, no guarding and no CVA tenderness  Musculoskeletal: Normal range of motion  She exhibits no edema or tenderness     Neurological: She is alert and oriented to person, place, and time  She has normal reflexes  No cranial nerve deficit or sensory deficit  She exhibits normal muscle tone  Coordination and gait normal  GCS eye subscore is 4  GCS verbal subscore is 5  GCS motor subscore is 6  Skin: Skin is warm and dry  Capillary refill takes less than 2 seconds  No rash noted  Psychiatric: She has a normal mood and affect  Her speech is normal and behavior is normal    Nursing note and vitals reviewed  Vital Signs  ED Triage Vitals [12/29/19 1657]   Temperature Pulse Respirations Blood Pressure SpO2   98 1 °F (36 7 °C) 94 18 143/65 100 %      Temp Source Heart Rate Source Patient Position - Orthostatic VS BP Location FiO2 (%)   Oral Monitor Sitting Left arm --      Pain Score       No Pain           Vitals:    12/29/19 1730 12/29/19 1800 12/29/19 1815 12/29/19 1843   BP: 125/60 119/58  121/75   Pulse: 87 87 86 83   Patient Position - Orthostatic VS:             Visual Acuity  Visual Acuity      Most Recent Value   L Pupil Size (mm)  3   R Pupil Size (mm)  3          ED Medications  Medications   potassium chloride 20 mEq IVPB (premix) (20 mEq Intravenous New Bag 12/29/19 1823)   sodium chloride 0 9 % bolus 1,000 mL (1,000 mL Intravenous New Bag 12/29/19 1823)   potassium chloride 10 % oral solution 40 mEq (40 mEq Oral Given 12/29/19 1815)       Diagnostic Studies  Results Reviewed     Procedure Component Value Units Date/Time    Magnesium [[id number]]  (Normal) Collected:  12/29/19 1710    Lab Status:  Final result Specimen:  Blood from Arm, Right Updated:  12/29/19 1805     Magnesium 2 3 mg/dL     TSH [[id number]]  (Normal) Collected:  12/29/19 1710    Lab Status:  Final result Specimen:  Blood from Arm, Right Updated:  12/29/19 1805     TSH 3RD GENERATON 3 633 uIU/mL     Narrative:       Patients undergoing fluorescein dye angiography may retain small amounts of fluorescein in the body for 48-72 hours post procedure   Samples containing fluorescein can produce falsely depressed TSH values  If the patient had this procedure,a specimen should be resubmitted post fluorescein clearance        Comprehensive metabolic panel [[id number]]  (Abnormal) Collected:  12/29/19 1710    Lab Status:  Final result Specimen:  Blood from Arm, Right Updated:  12/29/19 1749     Sodium 140 mmol/L      Potassium 2 2 mmol/L      Chloride 101 mmol/L      CO2 26 mmol/L      ANION GAP 13 mmol/L      BUN 17 mg/dL      Creatinine 1 16 mg/dL      Glucose 99 mg/dL      Calcium 8 6 mg/dL      AST 25 U/L      ALT 23 U/L      Alkaline Phosphatase 53 U/L      Total Protein 7 9 g/dL      Albumin 4 1 g/dL      Total Bilirubin 0 40 mg/dL      eGFR 61 ml/min/1 73sq m     Narrative:       National Kidney Disease Foundation guidelines for Chronic Kidney Disease (CKD):     Stage 1 with normal or high GFR (GFR > 90 mL/min/1 73 square meters)    Stage 2 Mild CKD (GFR = 60-89 mL/min/1 73 square meters)    Stage 3A Moderate CKD (GFR = 45-59 mL/min/1 73 square meters)    Stage 3B Moderate CKD (GFR = 30-44 mL/min/1 73 square meters)    Stage 4 Severe CKD (GFR = 15-29 mL/min/1 73 square meters)    Stage 5 End Stage CKD (GFR <15 mL/min/1 73 square meters)  Note: GFR calculation is accurate only with a steady state creatinine    Troponin I [[id number]]  (Normal) Collected:  12/29/19 1710    Lab Status:  Final result Specimen:  Blood from Arm, Right Updated:  12/29/19 1741     Troponin I <0 02 ng/mL     CBC and differential [[id number]] Collected:  12/29/19 1710    Lab Status:  Final result Specimen:  Blood from Arm, Right Updated:  12/29/19 1724     WBC 7 04 Thousand/uL      RBC 4 78 Million/uL      Hemoglobin 14 2 g/dL      Hematocrit 44 3 %      MCV 93 fL      MCH 29 7 pg      MCHC 32 1 g/dL      RDW 13 8 %      MPV 10 8 fL      Platelets 069 Thousands/uL      nRBC 0 /100 WBCs      Neutrophils Relative 61 %      Immat GRANS % 0 %      Lymphocytes Relative 28 %      Monocytes Relative 8 %      Eosinophils Relative 2 % Basophils Relative 1 %      Neutrophils Absolute 4 30 Thousands/µL      Immature Grans Absolute 0 02 Thousand/uL      Lymphocytes Absolute 1 96 Thousands/µL      Monocytes Absolute 0 56 Thousand/µL      Eosinophils Absolute 0 15 Thousand/µL      Basophils Absolute 0 05 Thousands/µL                  No orders to display              Procedures  ECG 12 Lead Documentation Only  Date/Time: 12/29/2019 5:15 PM  Performed by: Cami Glass PA-C  Authorized by: Cami Glass PA-C     Indications / Diagnosis:  Weakness  ECG reviewed by me, the ED Provider: yes    Patient location:  ED  Previous ECG:     Previous ECG:  Compared to current    Similarity:  Changes noted    Comparison to cardiac monitor: Yes    Interpretation:     Interpretation: abnormal    Rate:     ECG rate:  90    ECG rate assessment: normal    Rhythm:     Rhythm: sinus rhythm    Ectopy:     Ectopy: none    QRS:     QRS axis:  Normal    QRS intervals:  Normal  Conduction:     Conduction: normal    ST segments:     ST segments:  Non-specific  T waves:     T waves: flattening and inverted      Flattening:  I, II, III, aVF and V2    Inverted:  V3, V4, V5 and V6  Comments:      , QRS 94, QT/QTc 328/401; compared to prior - shortened WA, QTc interval normalized, ST depressions improved  ED Course  ED Course as of Dec 29 1852   Sun Dec 29, 2019   1726 WBC: 7 04   1726 Hemoglobin: 14 2   1726 Platelet Count: 023   1743 Troponin I: <0 02   1759 Glucose, Random: 99   1759 Creatinine: 1 16   1759 BUN: 17   1759 Sodium: 140   1759 3 3 six days ago  Will replete with oral solution and IV  Mag pending  Potassium(!!): 2 2   1759 Chloride: 101   1759 CO2: 26   1759 Anion Gap: 13   1759 BUN: 17   1759 Calcium: 8 6   1759 AST: 25   1759 ALT: 23   1759 Alkaline Phosphatase: 53   1759 Total Protein: 7 9   1759 Albumin: 4 1   1759 TOTAL BILIRUBIN: 0 40   1759 eGFR: 61   1804 Pt and spouse updated    Pt reports she doesn't tolerate the PO potassium pills   Does take liquid and reports having her home dose today  1806 TSH 3RD GENERATON: 3 633   1806 Magnesium: 2 3   1807 Tiger Text sent to on call SLIM Dr Jordan Steinberg to discuss admission  [address] Per Dr Jordan Steinberg, okay to admit to obs  Pt and spouse in agreeance with proposed admission and treatment plan  All questions answered  Pt admitted in stable condition      HEART Risk Score      Most Recent Value   History  0 Filed at: 12/29/2019 1727   ECG  1 Filed at: 12/29/2019 1727   Age  0 Filed at: 12/29/2019 1727   Risk Factors  0 Filed at: 12/29/2019 1727   Troponin  0 Filed at: 12/29/2019 1727   Heart Score Risk Calculator   History  0 Filed at: 12/29/2019 1727   ECG  1 Filed at: 12/29/2019 1727   Age  0 Filed at: 12/29/2019 1727   Risk Factors  0 Filed at: 12/29/2019 1727   Troponin  0 Filed at: 12/29/2019 1727   HEART Score  1 Filed at: 12/29/2019 1727   HEART Score  1 Filed at: 12/29/2019 1727                            [facility]  Number of Diagnoses or Management Options  Hypokalemia: established and worsening     Amount and/or Complexity of Data Reviewed  Clinical lab tests: ordered and reviewed  Decide to obtain previous medical records or to obtain history from someone other than the patient: yes  Obtain history from someone other than the patient: yes  Review and summarize past medical records: yes  Discuss the patient with other providers: yes (Attending, [facility])    Patient Progress  Patient progress: stable        Disposition  Final diagnoses:   Hypokalemia     Time reflects when diagnosis was documented in both MDM as applicable and the Disposition within this note     Time User Action Codes Description Comment    12/29/2019  6:14 PM Yolaraj Maryann, [address] [[id number]] Hypokalemia       ED Disposition     ED Disposition Condition Date/Time Comment    Admit Stable Sun Dec 29, 2019  6:14 PM Case was discussed with Dr Jordan Steinberg and the patient's admission status was agreed to be Admission Status: observation status to the service of Dr Serenity Marshall  Follow-up Information    None         Current Discharge Medication List      CONTINUE these medications which have NOT CHANGED    Details   AMILoride 5 mg tablet Take 1 tablet (5 mg total) by mouth daily  Qty: 30 tablet, Refills: 0    Associated Diagnoses: Hypokalemia      amitriptyline (ELAVIL) 50 mg tablet Take 50 mg by mouth daily at bedtime      b complex-C-folic acid (NEPHRO-EDIE) 0 8 mg tablet Take 0 8 mg by mouth daily with dinner      ergocalciferol (ERGOCALCIFEROL) 51935 units capsule Take 50,000 Units by mouth once a week Patient takes this med on Mondays       ferrous sulfate 325 (65 Fe) mg tablet Take 325 mg by mouth every other day       meclizine (ANTIVERT) 25 mg tablet Take 25 mg by mouth daily at bedtime      potassium chloride 10 % oral solution Take 30 mL (40 mEq total) by mouth 5 (five) times a day  Qty: 4500 mL, Refills: 0    Associated Diagnoses: Hypokalemia      SUMAtriptan (IMITREX) 100 mg tablet Take 100 mg by mouth daily at bedtime       thiamine 100 MG tablet Take 100 mg by mouth daily      topiramate (TOPAMAX) 100 mg tablet Take 100 mg by mouth 2 (two) times a day      zinc sulfate (ZINCATE) 220 mg capsule Take 1 capsule (220 mg total) by mouth daily  Qty: 30 capsule, Refills: 0    Associated Diagnoses: Gastric bypass status for obesity           No discharge procedures on file      ED Provider  Electronically Signed by           Tika Nina PA-C  12/29/19 9181

## 2019-12-30 ENCOUNTER — TELEPHONE (OUTPATIENT)
Dept: NEPHROLOGY | Facility: CLINIC | Age: 34
End: 2019-12-30

## 2019-12-30 VITALS
OXYGEN SATURATION: 100 % | BODY MASS INDEX: 27.46 KG/M2 | DIASTOLIC BLOOD PRESSURE: 54 MMHG | SYSTOLIC BLOOD PRESSURE: 100 MMHG | HEIGHT: 63 IN | HEART RATE: 80 BPM | TEMPERATURE: 98 F | RESPIRATION RATE: 19 BRPM | WEIGHT: 154.98 LBS

## 2019-12-30 LAB
ANION GAP SERPL CALCULATED.3IONS-SCNC: 8 MMOL/L (ref 4–13)
ANION GAP SERPL CALCULATED.3IONS-SCNC: 9 MMOL/L (ref 4–13)
ATRIAL RATE: 90 BPM
BASOPHILS # BLD AUTO: 0.05 THOUSANDS/ΜL (ref 0–0.1)
BASOPHILS NFR BLD AUTO: 1 % (ref 0–1)
BUN SERPL-MCNC: 16 MG/DL (ref 5–25)
BUN SERPL-MCNC: 18 MG/DL (ref 5–25)
CALCIUM SERPL-MCNC: 7.3 MG/DL (ref 8.3–10.1)
CALCIUM SERPL-MCNC: 7.4 MG/DL (ref 8.3–10.1)
CHLORIDE SERPL-SCNC: 111 MMOL/L (ref 100–108)
CHLORIDE SERPL-SCNC: 112 MMOL/L (ref 100–108)
CO2 SERPL-SCNC: 21 MMOL/L (ref 21–32)
CO2 SERPL-SCNC: 21 MMOL/L (ref 21–32)
CREAT SERPL-MCNC: 0.84 MG/DL (ref 0.6–1.3)
CREAT SERPL-MCNC: 1.01 MG/DL (ref 0.6–1.3)
EOSINOPHIL # BLD AUTO: 0.18 THOUSAND/ΜL (ref 0–0.61)
EOSINOPHIL NFR BLD AUTO: 4 % (ref 0–6)
ERYTHROCYTE [DISTWIDTH] IN BLOOD BY AUTOMATED COUNT: 14 % (ref 11.6–15.1)
GFR SERPL CREATININE-BSD FRML MDRD: 72 ML/MIN/1.73SQ M
GFR SERPL CREATININE-BSD FRML MDRD: 90 ML/MIN/1.73SQ M
GLUCOSE P FAST SERPL-MCNC: 92 MG/DL (ref 65–99)
GLUCOSE SERPL-MCNC: 83 MG/DL (ref 65–140)
GLUCOSE SERPL-MCNC: 92 MG/DL (ref 65–140)
HCT VFR BLD AUTO: 36.3 % (ref 34.8–46.1)
HGB BLD-MCNC: 11.6 G/DL (ref 11.5–15.4)
IMM GRANULOCYTES # BLD AUTO: 0.01 THOUSAND/UL (ref 0–0.2)
IMM GRANULOCYTES NFR BLD AUTO: 0 % (ref 0–2)
LYMPHOCYTES # BLD AUTO: 2.05 THOUSANDS/ΜL (ref 0.6–4.47)
LYMPHOCYTES NFR BLD AUTO: 41 % (ref 14–44)
MAGNESIUM SERPL-MCNC: 2.1 MG/DL (ref 1.6–2.6)
MCH RBC QN AUTO: 30.3 PG (ref 26.8–34.3)
MCHC RBC AUTO-ENTMCNC: 32 G/DL (ref 31.4–37.4)
MCV RBC AUTO: 95 FL (ref 82–98)
MONOCYTES # BLD AUTO: 0.46 THOUSAND/ΜL (ref 0.17–1.22)
MONOCYTES NFR BLD AUTO: 9 % (ref 4–12)
NEUTROPHILS # BLD AUTO: 2.21 THOUSANDS/ΜL (ref 1.85–7.62)
NEUTS SEG NFR BLD AUTO: 45 % (ref 43–75)
NRBC BLD AUTO-RTO: 0 /100 WBCS
P AXIS: 12 DEGREES
PHOSPHATE SERPL-MCNC: 3.3 MG/DL (ref 2.7–4.5)
PLATELET # BLD AUTO: 211 THOUSANDS/UL (ref 149–390)
PMV BLD AUTO: 10.4 FL (ref 8.9–12.7)
POTASSIUM SERPL-SCNC: 3 MMOL/L (ref 3.5–5.3)
POTASSIUM SERPL-SCNC: 3.4 MMOL/L (ref 3.5–5.3)
PR INTERVAL: 110 MS
QRS AXIS: 32 DEGREES
QRSD INTERVAL: 94 MS
QT INTERVAL: 328 MS
QTC INTERVAL: 401 MS
RBC # BLD AUTO: 3.83 MILLION/UL (ref 3.81–5.12)
SODIUM SERPL-SCNC: 141 MMOL/L (ref 136–145)
SODIUM SERPL-SCNC: 141 MMOL/L (ref 136–145)
T WAVE AXIS: 148 DEGREES
VENTRICULAR RATE: 90 BPM
WBC # BLD AUTO: 4.96 THOUSAND/UL (ref 4.31–10.16)

## 2019-12-30 PROCEDURE — 83735 ASSAY OF MAGNESIUM: CPT | Performed by: FAMILY MEDICINE

## 2019-12-30 PROCEDURE — 93010 ELECTROCARDIOGRAM REPORT: CPT | Performed by: INTERNAL MEDICINE

## 2019-12-30 PROCEDURE — 84100 ASSAY OF PHOSPHORUS: CPT | Performed by: FAMILY MEDICINE

## 2019-12-30 PROCEDURE — 80048 BASIC METABOLIC PNL TOTAL CA: CPT | Performed by: FAMILY MEDICINE

## 2019-12-30 PROCEDURE — 99217 PR OBSERVATION CARE DISCHARGE MANAGEMENT: CPT | Performed by: PHYSICIAN ASSISTANT

## 2019-12-30 PROCEDURE — 85025 COMPLETE CBC W/AUTO DIFF WBC: CPT | Performed by: FAMILY MEDICINE

## 2019-12-30 RX ADMIN — POTASSIUM CHLORIDE 20 MEQ: 14.9 INJECTION, SOLUTION INTRAVENOUS at 07:13

## 2019-12-30 RX ADMIN — POTASSIUM CHLORIDE 40 MEQ: 20 SOLUTION ORAL at 10:34

## 2019-12-30 RX ADMIN — POTASSIUM CHLORIDE 40 MEQ: 20 SOLUTION ORAL at 06:13

## 2019-12-30 RX ADMIN — POTASSIUM CHLORIDE 20 MEQ: 14.9 INJECTION, SOLUTION INTRAVENOUS at 05:08

## 2019-12-30 RX ADMIN — POTASSIUM CHLORIDE 20 MEQ: 14.9 INJECTION, SOLUTION INTRAVENOUS at 02:56

## 2019-12-30 RX ADMIN — ERGOCALCIFEROL 50000 UNITS: 1.25 CAPSULE ORAL at 08:35

## 2019-12-30 RX ADMIN — Medication 220 MG: at 08:34

## 2019-12-30 RX ADMIN — TOPIRAMATE 100 MG: 100 TABLET, FILM COATED ORAL at 08:34

## 2019-12-30 RX ADMIN — POTASSIUM CHLORIDE 20 MEQ: 14.9 INJECTION, SOLUTION INTRAVENOUS at 00:45

## 2019-12-30 RX ADMIN — Medication 100 MG: at 08:34

## 2019-12-30 NOTE — SOCIAL WORK
Cm met with the patient to evaluate the patients prior function and living situation and any barriers to d/c and form a safe d/c plan  Cm also evaluated the patient for any services in the home or needs for services  Pt resides at home with her spouse/family in a house  Pt is independent with her adls and ambulation  No services or DME  Pt and spouse both drive  PCP is Zari Urbano and pharmacy is [facility] in [facility]  Pt goes to outpatient infusion 2x's a week here at [facility]  Plans are home on dc with resumption of OP infusions and OP follow up  Discussed with patient preferences on discharge;understanding how to manage health at home; purpose of taking medications; importance of follow up care/appointments; and symptoms to watch out for once discharged home  Pt is being dc'd home on this date and will resume her outpatient infusions and follow up with Dr Yoel Watts

## 2019-12-30 NOTE — PLAN OF CARE
Problem: Potential for Falls  Goal: Patient will remain free of falls  Description  INTERVENTIONS:  - Assess patient frequently for physical needs  -  Identify cognitive and physical deficits and behaviors that affect risk of falls    -  [place] fall precautions as indicated by assessment   - Educate patient/family on patient safety including physical limitations  - Instruct patient to call for assistance with activity based on assessment  - Modify environment to reduce risk of injury  - Consider OT/PT consult to assist with strengthening/mobility  Outcome: Progressing     Problem: CARDIOVASCULAR - ADULT  Goal: Maintains optimal cardiac output and hemodynamic stability  Description  INTERVENTIONS:  - Monitor I/O, vital signs and rhythm  - Monitor for S/S and trends of decreased cardiac output  - Administer and titrate ordered vasoactive medications to optimize hemodynamic stability  - Assess quality of pulses, skin color and temperature  - Assess for signs of decreased coronary artery perfusion  - Instruct patient to report change in severity of symptoms  Outcome: Progressing  Goal: Absence of cardiac dysrhythmias or at baseline rhythm  Description  INTERVENTIONS:  - Continuous cardiac monitoring, vital signs, obtain 12 lead EKG if ordered  - Administer antiarrhythmic and heart rate control medications as ordered  - Monitor electrolytes and administer replacement therapy as ordered  Outcome: Progressing     Problem: METABOLIC, FLUID AND ELECTROLYTES - ADULT  Goal: Electrolytes maintained within normal limits  Description  INTERVENTIONS:  - Monitor labs and assess patient for signs and symptoms of electrolyte imbalances  - Administer electrolyte replacement as ordered  - Monitor response to electrolyte replacements, including repeat lab results as appropriate  - Instruct patient on fluid and nutrition as appropriate  Outcome: Progressing  Goal: Fluid balance maintained  Description  INTERVENTIONS:  - Monitor labs - Monitor I/O and WT  - Instruct patient on fluid and nutrition as appropriate  - Assess for signs & symptoms of volume excess or deficit  Outcome: Progressing     Problem: PAIN - ADULT  Goal: Verbalizes/displays adequate comfort level or baseline comfort level  Description  Interventions:  - Encourage patient to monitor pain and request assistance  - Assess pain using appropriate pain scale  - Administer analgesics based on type and severity of pain and evaluate response  - Implement non-pharmacological measures as appropriate and evaluate response  - Consider cultural and social influences on pain and pain management  - Notify physician/advanced practitioner if interventions unsuccessful or patient reports new pain  Outcome: Progressing     Problem: INFECTION - ADULT  Goal: Absence or prevention of progression during hospitalization  Description  INTERVENTIONS:  - Assess and monitor for signs and symptoms of infection  - Monitor lab/diagnostic results  - Monitor all insertion sites, i e  indwelling lines, tubes, and drains  - Monitor endotracheal if appropriate and nasal secretions for changes in amount and color  - [place] appropriate cooling/warming therapies per order  - Administer medications as ordered  - Instruct and encourage patient and family to use good hand hygiene technique  - Identify and instruct in appropriate isolation precautions for identified infection/condition  Outcome: Progressing  Goal: Absence of fever/infection during neutropenic period  Description  INTERVENTIONS:  - Monitor WBC    Outcome: Progressing     Problem: SAFETY ADULT  Goal: Maintain or return to baseline ADL function  Description  INTERVENTIONS:  -  Assess patient's ability to carry out ADLs; assess patient's baseline for ADL function and identify physical deficits which impact ability to perform ADLs (bathing, care of mouth/teeth, toileting, grooming, dressing, etc )  - Assess/evaluate cause of self-care deficits   - Assess range of motion  - Assess patient's mobility; develop plan if impaired  - Assess patient's need for assistive devices and provide as appropriate  - Encourage maximum independence but intervene and supervise when necessary  - Involve family in performance of ADLs  - Assess for home care needs following discharge   - Consider OT consult to assist with ADL evaluation and planning for discharge  - Provide patient education as appropriate  Outcome: Progressing  Goal: Maintain or return mobility status to optimal level  Description  INTERVENTIONS:  - Assess patient's baseline mobility status (ambulation, transfers, stairs, etc )    - Identify cognitive and physical deficits and behaviors that affect mobility  - Identify mobility aids required to assist with transfers and/or ambulation (gait belt, sit-to-stand, lift, walker, cane, etc )  - [place] fall precautions as indicated by assessment  - Record patient progress and toleration of activity level on Mobility SBAR; progress patient to next Phase/Stage  - Instruct patient to call for assistance with activity based on assessment  - Consider rehabilitation consult to assist with strengthening/weightbearing, etc   Outcome: Progressing     Problem: DISCHARGE PLANNING  Goal: Discharge to home or other facility with appropriate resources  Description  INTERVENTIONS:  - Identify barriers to discharge w/patient and caregiver  - Arrange for needed discharge resources and transportation as appropriate  - Identify discharge learning needs (meds, wound care, etc )  - Arrange for interpretive services to assist at discharge as needed  - Refer to Case Management Department for coordinating discharge planning if the patient needs post-hospital services based on physician/advanced practitioner order or complex needs related to functional status, cognitive ability, or social support system  Outcome: Progressing     Problem: Knowledge Deficit  Goal: Patient/family/caregiver demonstrates understanding of disease process, treatment plan, medications, and discharge instructions  Description  Complete learning assessment and assess knowledge base    Interventions:  - Provide teaching at level of understanding  - Provide teaching via preferred learning methods  Outcome: Progressing

## 2019-12-30 NOTE — UTILIZATION REVIEW
Initial Clinical Review    Admission: Date/Time/Statement:    Admission Orders (From admission, onward)     Ordered        12/29/19 1814  Place in Observation  Once                   Orders Placed This Encounter   Procedures    Place in Observation     Standing Status:   Standing     Number of Occurrences:   1     Order Specific Question:   Admitting Physician     Answer:   Teola Spatz     Order Specific Question:   Level of Care     Answer:   Med Surg [16]     ED Arrival Information     Expected Arrival Acuity Means of Arrival Escorted By Service Admission Type    - 12/29/2019 16:47 Urgent Walk-In Family Member General Medicine Urgent    Arrival Complaint    weakness        Chief Complaint   Patient presents with    Weakness - Generalized     pt regularly gets potassium transfusions and reccently came back from vacation, pt believes her potassium is low again and she has had intermittent numbness in face and hands which typically occurs when her potassium is low     Assessment/Plan:   27year old female presents with spouse ambulatory from home for evaluation of weakness  Pt notes h/o low potassium  She has a significant history of hypokalemia of unknown etiology  She has had extensive work up at [facility] per pt report  She now follows with nephrology and gets biweekly infusions  She states her last infusion was Monday 12/23 due to the holiday and a vacation  She had increased infusions prior to this but she notes there was still concern that her potassium would be low until next scheduled infusion  It has been six days since her last infusion and she feels potassium is too low  C/o weakness/fatigue  She also notes numbness intermittently in face and hands which usually occurs for her when her potassium is low  Denies fever, chills, cough, congestion or recent illness  Denies chest pain, SOB, N/V/D, abdominal pain    She has been taking her home potassium solution daily as prescribed (reports 200 mEQ daily)  She does indicate she doesn't tolerate the oral pills and they just "hang around in my stomach and don't dissolve"  Hypokalemia  Assessment & Plan  Patient presented to ER for symptoms of recurrent hypokalemia  Patient has idiopathic hypokalemia since 2018, requiring biweekly potassium infusions and multiple daily doses of oral potassium  Patient has been hospitalized several times this year for severe hypokalemia  Patient reports most recent infusion was December 23, and that she had extra infusions and oral supplementation prior to going on vacation for the past week  Despite these measures, she began feeling initial symptoms of generalized weakness/lethargy 2 days ago, progressing to paresthesias in extremities and perioral area yesterday  Patient cut her vacation short this morning to come to ER based on prior experience with these symptoms  Potassium 2 2 at time of presentation, remainder of labs, EKG, and overall physical exam reassuring  Will admit for potassium repletion via IV and oral routes    Once potassium level normalized, continue routine follow-up with specialty providers to continue evaluation and treatment as outpatient    ED Triage Vitals [12/29/19 1657]   Temperature Pulse Respirations Blood Pressure SpO2   98 1 °F (36 7 °C) 94 18 143/65 100 %      Temp Source Heart Rate Source Patient Position - Orthostatic VS BP Location FiO2 (%)   Oral Monitor Sitting Left arm --      Pain Score       No Pain        Wt Readings from Last 1 Encounters:   12/29/19 70 3 kg (154 lb 15 7 oz)     Additional Vital Signs:   Date/Time  Temp  Pulse  Resp  BP  MAP (mmHg)  SpO2  O2 Device  Patient Position - Orthostatic VS   12/30/19 06:20:59  98 °F (36 7 °C)  80  19  100/54  69  100 %  None (Room air)  Lying   12/30/19 00:07:21  98 3 °F (36 8 °C)  85  18  103/62  76  98 %  None (Room air)  Lying   12/29/19 22:25:41  98 3 °F (36 8 °C)  95  16  109/65  80  100 %       12/29/19 2018             None (Room air)     Pertinent Labs/Diagnostic Test Results:   Results from last 7 days   Lab Units 12/30/19  0527 12/29/19  1710   WBC Thousand/uL 4 96 7 04   HEMOGLOBIN g/dL 11 6 14 2   HEMATOCRIT % 36 3 44 3   PLATELETS Thousands/uL 211 295   NEUTROS ABS Thousands/µL 2 21 4 30     Results from last 7 days   Lab Units 12/30/19  0527 12/30/19  0044 12/29/19  1710   SODIUM mmol/L 141 141 140   POTASSIUM mmol/L 3 4* 3 0* 2 2*   CHLORIDE mmol/L 112* 111* 101   CO2 mmol/L 21 21 26   ANION GAP mmol/L 8 9 13   BUN mg/dL 16 18 17   CREATININE mg/dL 0 84 1 01 1 16   EGFR ml/min/1 73sq m 90 72 61   CALCIUM mg/dL 7 3* 7 4* 8 6   MAGNESIUM mg/dL 2 1  --  2 3   PHOSPHORUS mg/dL 3 3  --   --      Results from last 7 days   Lab Units 12/29/19  1710   AST U/L 25   ALT U/L 23   ALK PHOS U/L 53   TOTAL PROTEIN g/dL 7 9   ALBUMIN g/dL 4 1   TOTAL BILIRUBIN mg/dL 0 40     Results from last 7 days   Lab Units 12/30/19  0527 12/30/19  0044 12/29/19  1710   GLUCOSE RANDOM mg/dL 83 92 99     Results from last 7 days   Lab Units 12/29/19  1710   TROPONIN I ng/mL <0 02     Results from last 7 days   Lab Units 12/29/19  1710   TSH 3RD GENERATON uIU/mL 3 633     12/29  ekg=Sinus rhythm with short SC  Nonspecific T wave abnormality  Abnormal ECG  When compared with ECG of 05-NOV-2019 08:37,  QT has shortened  ED Treatment:   Medication Administration from 12/29/2019 1647 to 12/29/2019 1837       Date/Time Order Dose Route     12/29/2019 1823 potassium chloride 20 mEq IVPB (premix) 20 mEq Intravenous     12/29/2019 1815 potassium chloride 10 % oral solution 40 mEq 40 mEq Oral     12/29/2019 1823 sodium chloride 0 9 % bolus 1,000 mL 1,000 mL Intravenous        Past Medical History:   Diagnosis Date    H  pylori infection     Hypokalemia     Migraine     Rhabdomyolysis      Present on Admission:   Hypokalemia  Admitting Diagnosis: Hypokalemia [E87 6]  Weakness [R53 1]  Age/Sex: 27 y o  female  Admission Orders:  Telemetry  Pt/ot eval & tx  Access port-a-cath  Scheduled Medications:  AMILoride 5 mg Oral Daily   amitriptyline 50 mg Oral HS   b complex-vitamin C-folic acid 1 capsule Oral Daily With Dinner   ergocalciferol 50,000 Units Oral Weekly   ferrous sulfate 325 mg Oral Every Other Day   potassium chloride 40 mEq Oral 5x Daily   potassium chloride 20 mEq Intravenous Q2H   thiamine 100 mg Oral Daily   topiramate 100 mg Oral BID   zinc sulfate 220 mg Oral Daily   PRN Meds:  aluminum-magnesium hydroxide-simethicone 30 mL Oral Q6H PRN   docusate sodium 100 mg Oral BID PRN   meclizine 25 mg Oral Q8H PRN   ondansetron 4 mg Intravenous Q6H PRN   SUMAtriptan 100 mg Oral Once PRN     Network Utilization Review Department  [email or web address]  ATTENTION: Please call with any questions or concerns to [phone] and carefully listen to the prompts so that you are directed to the right person  All voicemails are confidential   Tessie Castro all requests for admission clinical reviews, approved or denied determinations and any other requests to dedicated fax number below belonging to the campus where the patient is receiving treatment   List of dedicated fax numbers for the Facilities:  [address] [place] [address] DENIALS (Administrative/Medical Necessity) [phone]   [address] [place] [address] (Maternity/NICU/Pediatrics) [phone]   Jordon Courser [phone]   Constantine Rivas [phone]   [facility] [phone]   [facility] [phone]   [address] [facility] [address] [facility] [phone]   [address] [facility] [phone]   [address] [facility] [address] [facility]  [address] [facility] [address] [facility] [phone]

## 2019-12-30 NOTE — DISCHARGE SUMMARY
Discharge- Estelita Given 1985, 27 y o  female MRN: [id number]    Unit/Bed#: 404-01 Encounter: [id number]    Primary Care Provider: Amy Phillips DO   Date and time admitted to hospital: 12/29/2019  4:51 PM        * Hypokalemia  Assessment & Plan  Patient presented to ER for symptoms of recurrent hypokalemia  Patient has idiopathic hypokalemia since 2018, requiring biweekly potassium infusions and multiple daily doses of oral potassium  Patient has been hospitalized several times this year for severe hypokalemia  Patient reports most recent infusion was December 23, and that she had extra infusions and oral supplementation prior to going on vacation for the past week  Despite these measures, she began feeling initial symptoms of generalized weakness/lethargy 2 days ago, progressing to paresthesias in extremities and perioral area yesterday  Patient cut her vacation short this morning to come to ER based on prior experience with these symptoms  Potassium 2 2 at time of presentation  The patient received PO and IV potassium  Repeat potassium this am is 3 4 at 0527 am  The patient has received additional PO potassium since this lab was drawn  The patient has an appointment scheduled for Thursday 1/2/20 at the infusion center for IV potassium  The patient was discharged home and instructed to keep this appointment and follow-up with her PCP within 1 week           Discharging Physician / Practitioner: Garrett Bradofrd PA-C  PCP: Amy Phillips DO  Admission Date:   Admission Orders (From admission, onward)     Ordered        12/29/19 1814  Place in Observation  Once                   Discharge Date: 12/30/19    Resolved Problems  Date Reviewed: 12/30/2019    None          Consultations During Hospital Stay:  · none    Procedures Performed:   · none    Significant Findings / Test Results:   · Potassium 2 2    Incidental Findings:   · none     Test Results Pending at Discharge (will require follow up):   · none     Outpatient Tests Requested:  · none    Complications:  none    Reason for Admission: hypokalemia of 2 2    Hospital Course: Rivas Vargas is a 27 y o  female patient who originally presented to the hospital on 12/29/2019 due to symptoms of recurrent hypokalemia  Patient has idiopathic hypokalemia since 2018, requiring biweekly potassium infusions and multiple daily doses of oral potassium  Patient has been hospitalized several times this year for severe hypokalemia  Patient reports most recent infusion was December 23, and that she had extra infusions and oral supplementation prior to going on vacation for the past week  Despite these measures, she began feeling initial symptoms of generalized weakness/lethargy 2 days ago, progressing to paresthesias in extremities and perioral area yesterday  Patient cut her vacation short this morning to come to ER based on prior experience with these symptoms  Please see above list of diagnoses and related plan for additional information  Condition at Discharge: good     Discharge Day Visit / Exam:     Subjective:    Vitals: Blood Pressure: 100/54 (12/30/19 0620)  Pulse: 80 (12/30/19 0620)  Temperature: 98 °F (36 7 °C) (12/30/19 0620)  Temp Source: Oral (12/30/19 8778)  Respirations: 19 (12/30/19 0620)  Height: 5' 3" (160 cm) (12/29/19 1843)  Weight - Scale: 70 3 kg (154 lb 15 7 oz) (12/29/19 1843)  SpO2: 100 % (12/30/19 7522)  Exam:   Physical Exam   Constitutional: She is oriented to person, place, and time  She appears well-developed and well-nourished  HENT:   Head: Normocephalic  Cardiovascular: Normal rate and regular rhythm  Pulmonary/Chest: Effort normal and breath sounds normal  She has no wheezes  She has no rales  Abdominal: Soft  Bowel sounds are normal  There is no tenderness  Neurological: She is alert and oriented to person, place, and time  No cranial nerve deficit  Skin: Skin is warm and dry  Nursing note and vitals reviewed  Discharge instructions/Information to patient and family:   See after visit summary for information provided to patient and family  Provisions for Follow-Up Care:  See after visit summary for information related to follow-up care and any pertinent home health orders  Disposition:     Home    For Discharges to [facility] SNF:   · Not Applicable to this Patient - Not Applicable to this Patient    Planned Readmission: no     Discharge Statement:  I spent 25 minutes discharging the patient  This time was spent on the day of discharge  I had direct contact with the patient on the day of discharge  Greater than 50% of the total time was spent examining patient, answering all patient questions, arranging and discussing plan of care with patient as well as directly providing post-discharge instructions  Additional time then spent on discharge activities  Discharge Medications:  See after visit summary for reconciled discharge medications provided to patient and family        ** Please Note: This note has been constructed using a voice recognition system **

## 2019-12-30 NOTE — TELEPHONE ENCOUNTER
Patient called and is currently admitted  She is asking your advice as to what to do moving forward since her potassium is droppoing   She said she requested for you to visit her in the hospital  Please advise

## 2019-12-30 NOTE — PLAN OF CARE
Problem: Potential for Falls  Goal: Patient will remain free of falls  Description  INTERVENTIONS:  - Assess patient frequently for physical needs  -  Identify cognitive and physical deficits and behaviors that affect risk of falls    -  [place] fall precautions as indicated by assessment   - Educate patient/family on patient safety including physical limitations  - Instruct patient to call for assistance with activity based on assessment  - Modify environment to reduce risk of injury  - Consider OT/PT consult to assist with strengthening/mobility  12/30/2019 1050 by Airam Hummel RN  Outcome: Completed  12/30/2019 0706 by Airam Hummel RN  Outcome: Progressing     Problem: CARDIOVASCULAR - ADULT  Goal: Maintains optimal cardiac output and hemodynamic stability  Description  INTERVENTIONS:  - Monitor I/O, vital signs and rhythm  - Monitor for S/S and trends of decreased cardiac output  - Administer and titrate ordered vasoactive medications to optimize hemodynamic stability  - Assess quality of pulses, skin color and temperature  - Assess for signs of decreased coronary artery perfusion  - Instruct patient to report change in severity of symptoms  12/30/2019 1050 by Airam Hummel RN  Outcome: Completed  12/30/2019 0706 by Airam Hummel RN  Outcome: Progressing  Goal: Absence of cardiac dysrhythmias or at baseline rhythm  Description  INTERVENTIONS:  - Continuous cardiac monitoring, vital signs, obtain 12 lead EKG if ordered  - Administer antiarrhythmic and heart rate control medications as ordered  - Monitor electrolytes and administer replacement therapy as ordered  12/30/2019 1050 by Airam Hummel RN  Outcome: Completed  12/30/2019 0706 by Airam Hummel RN  Outcome: Progressing     Problem: METABOLIC, FLUID AND ELECTROLYTES - ADULT  Goal: Electrolytes maintained within normal limits  Description  INTERVENTIONS:  - Monitor labs and assess patient for signs and symptoms of electrolyte imbalances  - Administer electrolyte replacement as ordered  - Monitor response to electrolyte replacements, including repeat lab results as appropriate  - Instruct patient on fluid and nutrition as appropriate  12/30/2019 1050 by Ilya Krishna RN  Outcome: Completed  12/30/2019 0706 by Ilya Krishna RN  Outcome: Progressing  Goal: Fluid balance maintained  Description  INTERVENTIONS:  - Monitor labs   - Monitor I/O and WT  - Instruct patient on fluid and nutrition as appropriate  - Assess for signs & symptoms of volume excess or deficit  12/30/2019 1050 by Ilya Krishna RN  Outcome: Completed  12/30/2019 0706 by Ilya Krishna RN  Outcome: Progressing     Problem: PAIN - ADULT  Goal: Verbalizes/displays adequate comfort level or baseline comfort level  Description  Interventions:  - Encourage patient to monitor pain and request assistance  - Assess pain using appropriate pain scale  - Administer analgesics based on type and severity of pain and evaluate response  - Implement non-pharmacological measures as appropriate and evaluate response  - Consider cultural and social influences on pain and pain management  - Notify physician/advanced practitioner if interventions unsuccessful or patient reports new pain  12/30/2019 1050 by Ilya Krishna RN  Outcome: Completed  12/30/2019 0706 by Ilya Krishna RN  Outcome: Progressing     Problem: INFECTION - ADULT  Goal: Absence or prevention of progression during hospitalization  Description  INTERVENTIONS:  - Assess and monitor for signs and symptoms of infection  - Monitor lab/diagnostic results  - Monitor all insertion sites, i e  indwelling lines, tubes, and drains  - Monitor endotracheal if appropriate and nasal secretions for changes in amount and color  - [place] appropriate cooling/warming therapies per order  - Administer medications as ordered  - Instruct and encourage patient and family to use good hand hygiene technique  - Identify and instruct in appropriate isolation precautions for identified infection/condition  12/30/2019 1050 by Renny Zelaya RN  Outcome: Completed  12/30/2019 0706 by Renny Zelaya RN  Outcome: Progressing  Goal: Absence of fever/infection during neutropenic period  Description  INTERVENTIONS:  - Monitor WBC    12/30/2019 1050 by Renny Zelaya RN  Outcome: Completed  12/30/2019 0706 by Renny Zelaya RN  Outcome: Progressing     Problem: SAFETY ADULT  Goal: Maintain or return to baseline ADL function  Description  INTERVENTIONS:  -  Assess patient's ability to carry out ADLs; assess patient's baseline for ADL function and identify physical deficits which impact ability to perform ADLs (bathing, care of mouth/teeth, toileting, grooming, dressing, etc )  - Assess/evaluate cause of self-care deficits   - Assess range of motion  - Assess patient's mobility; develop plan if impaired  - Assess patient's need for assistive devices and provide as appropriate  - Encourage maximum independence but intervene and supervise when necessary  - Involve family in performance of ADLs  - Assess for home care needs following discharge   - Consider OT consult to assist with ADL evaluation and planning for discharge  - Provide patient education as appropriate  12/30/2019 1050 by Renny Zelaya RN  Outcome: Completed  12/30/2019 0706 by Renny Zelaya RN  Outcome: Progressing  Goal: Maintain or return mobility status to optimal level  Description  INTERVENTIONS:  - Assess patient's baseline mobility status (ambulation, transfers, stairs, etc )    - Identify cognitive and physical deficits and behaviors that affect mobility  - Identify mobility aids required to assist with transfers and/or ambulation (gait belt, sit-to-stand, lift, walker, cane, etc )  - [place] fall precautions as indicated by assessment  - Record patient progress and toleration of activity level on Mobility SBAR; progress patient to next Phase/Stage  - Instruct patient to call for assistance with activity based on assessment  - Consider rehabilitation consult to assist with strengthening/weightbearing, etc   12/30/2019 1050 by Terri Camargo RN  Outcome: Completed  12/30/2019 0706 by Terri Camargo RN  Outcome: Progressing     Problem: DISCHARGE PLANNING  Goal: Discharge to home or other facility with appropriate resources  Description  INTERVENTIONS:  - Identify barriers to discharge w/patient and caregiver  - Arrange for needed discharge resources and transportation as appropriate  - Identify discharge learning needs (meds, wound care, etc )  - Arrange for interpretive services to assist at discharge as needed  - Refer to Case Management Department for coordinating discharge planning if the patient needs post-hospital services based on physician/advanced practitioner order or complex needs related to functional status, cognitive ability, or social support system  12/30/2019 1050 by Terri Camargo RN  Outcome: Completed  12/30/2019 0706 by Terri Camargo RN  Outcome: Progressing     Problem: Knowledge Deficit  Goal: Patient/family/caregiver demonstrates understanding of disease process, treatment plan, medications, and discharge instructions  Description  Complete learning assessment and assess knowledge base    Interventions:  - Provide teaching at level of understanding  - Provide teaching via preferred learning methods  12/30/2019 1050 by Terri Camargo RN  Outcome: Completed  12/30/2019 0706 by Terri Camargo RN  Outcome: Progressing

## 2019-12-30 NOTE — H&P
H&P Exam - Carlos Michael 27 y o  female MRN: [id number]    Unit/Bed#: 404-01 Encounter: [id number]      Assessment/Plan       * Hypokalemia  Assessment & Plan  Patient presented to ER for symptoms of recurrent hypokalemia  Patient has idiopathic hypokalemia since 2018, requiring biweekly potassium infusions and multiple daily doses of oral potassium  Patient has been hospitalized several times this year for severe hypokalemia  Patient reports most recent infusion was December 23, and that she had extra infusions and oral supplementation prior to going on vacation for the past week  Despite these measures, she began feeling initial symptoms of generalized weakness/lethargy 2 days ago, progressing to paresthesias in extremities and perioral area yesterday  Patient cut her vacation short this morning to come to ER based on prior experience with these symptoms  Potassium 2 2 at time of presentation, remainder of labs, EKG, and overall physical exam reassuring  Will admit for potassium repletion via IV and oral routes  Once potassium level normalized, continue routine follow-up with specialty providers to continue evaluation and treatment as outpatient  History of Present Illness   HPI:  Carlos Michael is a 27 y o  female who presents for symptoms of recurrent hypokalemia  Patient has idiopathic hypokalemia since 2018, requiring biweekly potassium infusions and multiple daily doses of oral potassium  Patient has been hospitalized several times this year for severe hypokalemia  Patient reports most recent infusion was December 23, and that she had extra infusions and oral supplementation prior to going on vacation for the past week  Despite these measures, she began feeling initial symptoms of generalized weakness/lethargy 2 days ago, progressing to paresthesias in extremities and perioral area yesterday    Patient cut her vacation short this morning to come to ER based on prior experience with these symptoms  PCP: Americo Brantley DO    Review of Systems   Constitutional: Positive for fatigue  Negative for activity change, appetite change, chills, diaphoresis, fever and unexpected weight change  Respiratory: Negative for cough and shortness of breath  Cardiovascular: Negative for chest pain, palpitations and leg swelling  Musculoskeletal: Negative for myalgias  Neurological: Positive for weakness and numbness  All other systems reviewed and are negative  Historical Information   Past Medical History:   Diagnosis Date    H  pylori infection     Hypokalemia     Migraine     Rhabdomyolysis      Past Surgical History:   Procedure Laterality Date    ABDOMINAL SURGERY      APPENDECTOMY      CHOLECYSTECTOMY      COSMETIC SURGERY      FL GUIDED CENTRAL VENOUS ACCESS DEVICE INSERTION  12/21/2018    GASTRIC BYPASS      HYSTERECTOMY      TUNNELED VENOUS PORT PLACEMENT N/A 12/21/2018    Procedure: INSERTION VENOUS PORT (PORT-A-CATH); Surgeon: Nakia Ledesma DO;  Location: [facility];  Service: General     Social History   Social History     Substance and Sexual Activity   Alcohol Use Never    Alcohol/week: 0 0 standard drinks    Frequency: Never    Drinks per session: Patient refused    Binge frequency: Never    Comment: 0     Social History     Substance and Sexual Activity   Drug Use No     Social History     Tobacco Use   Smoking Status Never Smoker   Smokeless Tobacco Never Used     Family History:   Family History   Problem Relation Age of Onset    Hypertension Father     Diabetes Father     Diabetes Maternal Grandfather        Meds/Allergies   all medications and allergies reviewed  Allergies   Allergen Reactions    Nsaids Other (See Comments)     Other reaction(s): Other (Please comment)  Should not take s/p bariatric surgery  Other reaction(s):  Other (See Comments)  Should not take as per prior records  Should not take s/p bariatric surgery  Has hx of gastric bypass      Prednisone      Nausea, vomiting, diarrhea       Objective   Vitals: Blood pressure 121/75, pulse 83, temperature 98 2 °F (36 8 °C), resp  rate 18, height 5' 3" (1 6 m), weight 70 3 kg (154 lb 15 7 oz), SpO2 100 %  No intake or output data in the 24 hours ending 12/29/19 1933    Invasive Devices     Central Venous Catheter Line            Port A Cath 12/22/18 Right Chest 372 days          Peripheral Intravenous Line            Peripheral IV 12/29/19 Right Antecubital less than 1 day                Physical Exam   Constitutional: She is oriented to person, place, and time  She appears well-developed and well-nourished  No distress  Healthy-appearing female lying in hospital bed; no acute distress  HENT:   Head: Normocephalic and atraumatic  Right Ear: External ear normal    Left Ear: External ear normal    Nose: Nose normal    Eyes: Pupils are equal, round, and reactive to light  Conjunctivae and EOM are normal  Right eye exhibits no discharge  Left eye exhibits no discharge  No scleral icterus  Neck: No JVD present  No tracheal deviation present  No thyromegaly present  Cardiovascular: Normal rate, regular rhythm, normal heart sounds and intact distal pulses  Exam reveals no gallop and no friction rub  No murmur heard  Pulmonary/Chest: Effort normal and breath sounds normal  No stridor  No respiratory distress  She has no wheezes  She has no rales  She exhibits no tenderness  Port-a-cath in place   Abdominal: Soft  Bowel sounds are normal  She exhibits no distension and no mass  There is no tenderness  There is no rebound and no guarding  Musculoskeletal: She exhibits no edema, tenderness or deformity  Lymphadenopathy:     She has no cervical adenopathy  Neurological: She is alert and oriented to person, place, and time  Skin: Skin is warm and dry  Capillary refill takes less than 2 seconds  No rash noted  She is not diaphoretic  No erythema  No pallor     Psychiatric: She has a normal mood and affect  Her behavior is normal  Judgment and thought content normal    Nursing note and vitals reviewed  Lab Results: I have personally reviewed pertinent reports  Imaging: I have personally reviewed pertinent reports  and I have personally reviewed pertinent films in PACS  EKG, Pathology, and Other Studies: I have personally reviewed pertinent reports  Code Status: Level 1 - Full Code  Advance Directive and Living Will:      Power of :    POLST:      Counseling / Coordination of Care  Total floor / unit time spent today 40 minutes  Greater than 50% of total time was spent with the patient and / or family counseling and / or coordination of care  A description of the counseling / coordination of care:  Greater than 20 minutes spent with this patient discussing diagnosis, prognosis, and plan of care

## 2019-12-30 NOTE — TELEPHONE ENCOUNTER
I saw her labs, and realize that it took 1 week of no IV supplementation with potassium  I know that we have spoken about seening somebody [facility], is that appointment in?   Or are they still reviewing her material

## 2019-12-30 NOTE — ASSESSMENT & PLAN NOTE
Patient presented to ER for symptoms of recurrent hypokalemia  Patient has idiopathic hypokalemia since 2018, requiring biweekly potassium infusions and multiple daily doses of oral potassium  Patient has been hospitalized several times this year for severe hypokalemia  Patient reports most recent infusion was December 23, and that she had extra infusions and oral supplementation prior to going on vacation for the past week  Despite these measures, she began feeling initial symptoms of generalized weakness/lethargy 2 days ago, progressing to paresthesias in extremities and perioral area yesterday  Patient cut her vacation short this morning to come to ER based on prior experience with these symptoms  Potassium 2 2 at time of presentation  The patient received PO and IV potassium  Repeat potassium this am is 3 4 at 0527 am  The patient has received additional PO potassium since this lab was drawn  The patient has an appointment scheduled for Thursday 1/2/20 at the infusion center for IV potassium  The patient was discharged home and instructed to keep this appointment and follow-up with her PCP within 1 week

## 2020-01-02 ENCOUNTER — HOSPITAL ENCOUNTER (OUTPATIENT)
Dept: INFUSION CENTER | Facility: HOSPITAL | Age: 35
Discharge: HOME | End: 2020-01-02
Payer: COMMERCIAL

## 2020-01-02 VITALS
SYSTOLIC BLOOD PRESSURE: 112 MMHG | DIASTOLIC BLOOD PRESSURE: 52 MMHG | RESPIRATION RATE: 16 BRPM | HEART RATE: 95 BPM | TEMPERATURE: 98.4 F

## 2020-01-02 DIAGNOSIS — E87.6 HYPOKALEMIA: Primary | ICD-10-CM

## 2020-01-02 LAB
ANION GAP SERPL CALCULATED.3IONS-SCNC: 12 MMOL/L (ref 4–13)
BUN SERPL-MCNC: 13 MG/DL (ref 5–25)
CALCIUM SERPL-MCNC: 8.4 MG/DL (ref 8.3–10.1)
CHLORIDE SERPL-SCNC: 109 MMOL/L (ref 100–108)
CO2 SERPL-SCNC: 22 MMOL/L (ref 21–32)
CREAT SERPL-MCNC: 0.97 MG/DL (ref 0.6–1.3)
GFR SERPL CREATININE-BSD FRML MDRD: 76 ML/MIN/1.73SQ M
GLUCOSE P FAST SERPL-MCNC: 78 MG/DL (ref 65–99)
GLUCOSE SERPL-MCNC: 78 MG/DL (ref 65–140)
POTASSIUM SERPL-SCNC: 2.8 MMOL/L (ref 3.5–5.3)
SODIUM SERPL-SCNC: 143 MMOL/L (ref 136–145)

## 2020-01-02 PROCEDURE — 80048 BASIC METABOLIC PNL TOTAL CA: CPT | Performed by: INTERNAL MEDICINE

## 2020-01-02 PROCEDURE — 96365 THER/PROPH/DIAG IV INF INIT: CPT

## 2020-01-02 PROCEDURE — 96366 THER/PROPH/DIAG IV INF ADDON: CPT

## 2020-01-02 RX ORDER — SODIUM CHLORIDE 9 MG/ML
20 INJECTION, SOLUTION INTRAVENOUS CONTINUOUS
Status: CANCELLED
Start: 2020-01-06

## 2020-01-02 RX ORDER — SODIUM CHLORIDE 9 MG/ML
20 INJECTION, SOLUTION INTRAVENOUS CONTINUOUS
Status: DISCONTINUED | OUTPATIENT
Start: 2020-01-02 | End: 2020-01-05 | Stop reason: HOSPADM

## 2020-01-02 RX ADMIN — POTASSIUM CHLORIDE: 2 INJECTION, SOLUTION, CONCENTRATE INTRAVENOUS at 09:14

## 2020-01-02 NOTE — PROGRESS NOTES
Patient K 2 8 Dr Blayne Spain made aware, patient states she still isn't feeling her best, tired, no energy, hands still a little numb  Per Dr Blayne Spain she will need all of her 60meq today, and if she still isn't feeling well over the weekend to come in the ED for more potassium infusions  Patient verbalizes understanding

## 2020-01-07 ENCOUNTER — HOSPITAL ENCOUNTER (OUTPATIENT)
Dept: INFUSION CENTER | Facility: HOSPITAL | Age: 35
Discharge: HOME | End: 2020-01-07
Payer: COMMERCIAL

## 2020-01-07 VITALS
HEART RATE: 80 BPM | SYSTOLIC BLOOD PRESSURE: 116 MMHG | TEMPERATURE: 98.1 F | DIASTOLIC BLOOD PRESSURE: 53 MMHG | RESPIRATION RATE: 16 BRPM

## 2020-01-07 DIAGNOSIS — E87.6 HYPOKALEMIA: Primary | ICD-10-CM

## 2020-01-07 LAB
ANION GAP SERPL CALCULATED.3IONS-SCNC: 13 MMOL/L (ref 4–13)
BUN SERPL-MCNC: 10 MG/DL (ref 5–25)
CALCIUM SERPL-MCNC: 8.1 MG/DL (ref 8.3–10.1)
CHLORIDE SERPL-SCNC: 109 MMOL/L (ref 100–108)
CO2 SERPL-SCNC: 20 MMOL/L (ref 21–32)
CREAT SERPL-MCNC: 0.81 MG/DL (ref 0.6–1.3)
GFR SERPL CREATININE-BSD FRML MDRD: 94 ML/MIN/1.73SQ M
GLUCOSE SERPL-MCNC: 84 MG/DL (ref 65–140)
POTASSIUM SERPL-SCNC: 3.3 MMOL/L (ref 3.5–5.3)
SODIUM SERPL-SCNC: 142 MMOL/L (ref 136–145)

## 2020-01-07 PROCEDURE — 80048 BASIC METABOLIC PNL TOTAL CA: CPT | Performed by: INTERNAL MEDICINE

## 2020-01-07 PROCEDURE — 96365 THER/PROPH/DIAG IV INF INIT: CPT

## 2020-01-07 PROCEDURE — 96366 THER/PROPH/DIAG IV INF ADDON: CPT

## 2020-01-07 RX ORDER — SODIUM CHLORIDE 9 MG/ML
20 INJECTION, SOLUTION INTRAVENOUS CONTINUOUS
Status: CANCELLED
Start: 2020-01-09

## 2020-01-07 RX ORDER — SODIUM CHLORIDE 9 MG/ML
20 INJECTION, SOLUTION INTRAVENOUS CONTINUOUS
Status: DISCONTINUED | OUTPATIENT
Start: 2020-01-07 | End: 2020-01-10 | Stop reason: HOSPADM

## 2020-01-07 RX ADMIN — POTASSIUM CHLORIDE: 2 INJECTION, SOLUTION, CONCENTRATE INTRAVENOUS at 10:37

## 2020-01-07 NOTE — PLAN OF CARE
Problem: Potential for Falls  Goal: Patient will remain free of falls  Description  INTERVENTIONS:  - Assess patient frequently for physical needs  -  Identify cognitive and physical deficits and behaviors that affect risk of falls  -  [place] fall precautions as indicated by assessment   - Educate patient/family on patient safety including physical limitations  - Instruct patient to call for assistance with activity based on assessment  - Modify environment to reduce risk of injury  - Consider OT/PT consult to assist with strengthening/mobility  Outcome: Progressing     Problem: INFECTION - ADULT  Goal: Absence or prevention of progression during hospitalization  Description  INTERVENTIONS:  - Assess and monitor for signs and symptoms of infection  - Monitor lab/diagnostic results  - Monitor all insertion sites, i e  indwelling lines, tubes, and drains  - Monitor endotracheal if appropriate and nasal secretions for changes in amount and color  - [place] appropriate cooling/warming therapies per order  - Administer medications as ordered  - Instruct and encourage patient and family to use good hand hygiene technique  - Identify and instruct in appropriate isolation precautions for identified infection/condition  Outcome: Progressing     Problem: Knowledge Deficit  Goal: Patient/family/caregiver demonstrates understanding of disease process, treatment plan, medications, and discharge instructions  Description  Complete learning assessment and assess knowledge base    Interventions:  - Provide teaching at level of understanding  - Provide teaching via preferred learning methods  Outcome: Progressing

## 2020-01-07 NOTE — PROGRESS NOTES
Patient tolerated fluids well, patient only stay for 4 hours, not 6 hours due to   Patient left in stable condition

## 2020-01-09 ENCOUNTER — HOSPITAL ENCOUNTER (OUTPATIENT)
Dept: INFUSION CENTER | Facility: HOSPITAL | Age: 35
Discharge: HOME | End: 2020-01-09
Payer: COMMERCIAL

## 2020-01-09 VITALS
SYSTOLIC BLOOD PRESSURE: 114 MMHG | RESPIRATION RATE: 18 BRPM | HEART RATE: 85 BPM | DIASTOLIC BLOOD PRESSURE: 61 MMHG | TEMPERATURE: 97.2 F

## 2020-01-09 DIAGNOSIS — E87.6 HYPOKALEMIA: Primary | ICD-10-CM

## 2020-01-09 LAB
ANION GAP SERPL CALCULATED.3IONS-SCNC: 12 MMOL/L (ref 4–13)
BUN SERPL-MCNC: 8 MG/DL (ref 5–25)
CALCIUM SERPL-MCNC: 8 MG/DL (ref 8.3–10.1)
CHLORIDE SERPL-SCNC: 108 MMOL/L (ref 100–108)
CO2 SERPL-SCNC: 18 MMOL/L (ref 21–32)
CREAT SERPL-MCNC: 0.85 MG/DL (ref 0.6–1.3)
GFR SERPL CREATININE-BSD FRML MDRD: 89 ML/MIN/1.73SQ M
GLUCOSE SERPL-MCNC: 79 MG/DL (ref 65–140)
POTASSIUM SERPL-SCNC: 3.2 MMOL/L (ref 3.5–5.3)
SODIUM SERPL-SCNC: 138 MMOL/L (ref 136–145)

## 2020-01-09 PROCEDURE — 96365 THER/PROPH/DIAG IV INF INIT: CPT

## 2020-01-09 PROCEDURE — 96366 THER/PROPH/DIAG IV INF ADDON: CPT

## 2020-01-09 PROCEDURE — 80048 BASIC METABOLIC PNL TOTAL CA: CPT | Performed by: INTERNAL MEDICINE

## 2020-01-09 RX ORDER — SODIUM CHLORIDE 9 MG/ML
20 INJECTION, SOLUTION INTRAVENOUS CONTINUOUS
Status: DISCONTINUED | OUTPATIENT
Start: 2020-01-09 | End: 2020-01-12 | Stop reason: HOSPADM

## 2020-01-09 RX ORDER — SODIUM CHLORIDE 9 MG/ML
20 INJECTION, SOLUTION INTRAVENOUS CONTINUOUS
Status: CANCELLED
Start: 2020-01-13

## 2020-01-09 RX ADMIN — POTASSIUM CHLORIDE: 2 INJECTION, SOLUTION, CONCENTRATE INTRAVENOUS at 10:13

## 2020-01-09 NOTE — PLAN OF CARE
Problem: INFECTION - ADULT  Goal: Absence or prevention of progression during hospitalization  Description  INTERVENTIONS:  - Assess and monitor for signs and symptoms of infection  - Monitor lab/diagnostic results  - Monitor all insertion sites, i e  indwelling lines, tubes, and drains  - Monitor endotracheal if appropriate and nasal secretions for changes in amount and color  - [place] appropriate cooling/warming therapies per order  - Administer medications as ordered  - Instruct and encourage patient and family to use good hand hygiene technique  - Identify and instruct in appropriate isolation precautions for identified infection/condition  Outcome: Progressing     Problem: Knowledge Deficit  Goal: Patient/family/caregiver demonstrates understanding of disease process, treatment plan, medications, and discharge instructions  Description  Complete learning assessment and assess knowledge base    Interventions:  - Provide teaching at level of understanding  - Provide teaching via preferred learning methods  Outcome: Progressing

## 2020-01-09 NOTE — PROGRESS NOTES
Patient tolerated infusion well, patient left in stable condition  Patient left early due to picking up her children off the bus

## 2020-01-13 ENCOUNTER — TELEPHONE (OUTPATIENT)
Dept: NEPHROLOGY | Facility: CLINIC | Age: 35
End: 2020-01-13

## 2020-01-13 ENCOUNTER — HOSPITAL ENCOUNTER (OUTPATIENT)
Dept: INFUSION CENTER | Facility: HOSPITAL | Age: 35
Discharge: HOME | End: 2020-01-13
Payer: COMMERCIAL

## 2020-01-13 VITALS
TEMPERATURE: 98 F | SYSTOLIC BLOOD PRESSURE: 130 MMHG | OXYGEN SATURATION: 100 % | RESPIRATION RATE: 18 BRPM | DIASTOLIC BLOOD PRESSURE: 61 MMHG | HEART RATE: 96 BPM

## 2020-01-13 DIAGNOSIS — E87.6 HYPOKALEMIA: Primary | ICD-10-CM

## 2020-01-13 LAB
ANION GAP SERPL CALCULATED.3IONS-SCNC: 12 MMOL/L (ref 4–13)
BUN SERPL-MCNC: 15 MG/DL (ref 5–25)
CALCIUM SERPL-MCNC: 8.6 MG/DL (ref 8.3–10.1)
CHLORIDE SERPL-SCNC: 102 MMOL/L (ref 100–108)
CO2 SERPL-SCNC: 22 MMOL/L (ref 21–32)
CREAT SERPL-MCNC: 0.95 MG/DL (ref 0.6–1.3)
GFR SERPL CREATININE-BSD FRML MDRD: 78 ML/MIN/1.73SQ M
GLUCOSE SERPL-MCNC: 69 MG/DL (ref 65–140)
POTASSIUM SERPL-SCNC: 2.8 MMOL/L (ref 3.5–5.3)
SODIUM SERPL-SCNC: 136 MMOL/L (ref 136–145)

## 2020-01-13 PROCEDURE — 80048 BASIC METABOLIC PNL TOTAL CA: CPT | Performed by: INTERNAL MEDICINE

## 2020-01-13 PROCEDURE — 96365 THER/PROPH/DIAG IV INF INIT: CPT

## 2020-01-13 PROCEDURE — 96366 THER/PROPH/DIAG IV INF ADDON: CPT

## 2020-01-13 RX ORDER — SODIUM CHLORIDE 9 MG/ML
20 INJECTION, SOLUTION INTRAVENOUS CONTINUOUS
Status: CANCELLED
Start: 2020-01-16

## 2020-01-13 RX ORDER — SODIUM CHLORIDE 9 MG/ML
20 INJECTION, SOLUTION INTRAVENOUS CONTINUOUS
Status: DISCONTINUED | OUTPATIENT
Start: 2020-01-13 | End: 2020-01-16 | Stop reason: HOSPADM

## 2020-01-13 RX ADMIN — POTASSIUM CHLORIDE: 2 INJECTION, SOLUTION, CONCENTRATE INTRAVENOUS at 10:33

## 2020-01-13 NOTE — PROGRESS NOTES
Pt had to leave at 1430 to  grandchildren from school  Pt received 40 mEq  of potassium  Mediport hand flushed with 20 ml NSS  Discharged in satisfactory condition  Next appointment 1/16/2020

## 2020-01-13 NOTE — PROGRESS NOTES
Pt upset and crying  Privacy given and consoled  She spoke with Dr Deepika Conn office regarding her weight loss and low potassium  Dr Gaby Mcgowan is recommending  Infusion three times weekly  She has difficulty with paying her $3,000 deductible for the new year  Email sent to [employer] and patient assistance foundations  Tegan emailed a list of foundations that may be able to help  List given to patient with explanation on how they work  Pt appreciative for the list  Also was upset that it is difficult for her to get here 3 times per week  Dr Deepika Conn office is to check with insurance for coverage

## 2020-01-13 NOTE — PROGRESS NOTES
Pt's potassium level 2 8 and pt made aware of same  Offer given to patient if she wanted to go to the ED or have us give infusion  Pt stated she would rather have her infusion here and did not want to go to ED

## 2020-01-13 NOTE — PROGRESS NOTES
Pt arrived on unit at 0915  States she isn't feeling the best  Knows her potassium is down  Lost 9 pounds in the last week  Port accessed and labs drawn  Potassium level is 2 8  Okay to proceed with 60 mEq potassium today

## 2020-01-16 ENCOUNTER — HOSPITAL ENCOUNTER (OUTPATIENT)
Dept: INFUSION CENTER | Facility: HOSPITAL | Age: 35
Discharge: HOME | End: 2020-01-16
Payer: COMMERCIAL

## 2020-01-16 VITALS
DIASTOLIC BLOOD PRESSURE: 60 MMHG | SYSTOLIC BLOOD PRESSURE: 129 MMHG | OXYGEN SATURATION: 98 % | TEMPERATURE: 98.4 F | RESPIRATION RATE: 18 BRPM | HEART RATE: 89 BPM

## 2020-01-16 DIAGNOSIS — E87.6 HYPOKALEMIA: Primary | ICD-10-CM

## 2020-01-16 LAB
ANION GAP SERPL CALCULATED.3IONS-SCNC: 11 MMOL/L (ref 4–13)
BUN SERPL-MCNC: 13 MG/DL (ref 5–25)
CALCIUM SERPL-MCNC: 8.5 MG/DL (ref 8.3–10.1)
CHLORIDE SERPL-SCNC: 103 MMOL/L (ref 100–108)
CO2 SERPL-SCNC: 24 MMOL/L (ref 21–32)
CREAT SERPL-MCNC: 0.95 MG/DL (ref 0.6–1.3)
GFR SERPL CREATININE-BSD FRML MDRD: 78 ML/MIN/1.73SQ M
GLUCOSE SERPL-MCNC: 82 MG/DL (ref 65–140)
POTASSIUM SERPL-SCNC: 2.9 MMOL/L (ref 3.5–5.3)
SODIUM SERPL-SCNC: 138 MMOL/L (ref 136–145)

## 2020-01-16 PROCEDURE — 80048 BASIC METABOLIC PNL TOTAL CA: CPT | Performed by: INTERNAL MEDICINE

## 2020-01-16 PROCEDURE — 96366 THER/PROPH/DIAG IV INF ADDON: CPT

## 2020-01-16 PROCEDURE — 96365 THER/PROPH/DIAG IV INF INIT: CPT

## 2020-01-16 RX ORDER — SODIUM CHLORIDE 9 MG/ML
20 INJECTION, SOLUTION INTRAVENOUS CONTINUOUS
Status: CANCELLED
Start: 2020-01-20

## 2020-01-16 RX ORDER — SODIUM CHLORIDE 9 MG/ML
20 INJECTION, SOLUTION INTRAVENOUS CONTINUOUS
Status: DISCONTINUED | OUTPATIENT
Start: 2020-01-16 | End: 2020-01-19 | Stop reason: HOSPADM

## 2020-01-16 RX ADMIN — POTASSIUM CHLORIDE: 2 INJECTION, SOLUTION, CONCENTRATE INTRAVENOUS at 09:30

## 2020-01-16 NOTE — PROGRESS NOTES
Pt arrived on unit at 6 [facility]  States she is really tired  Has not felt good for the past 2 weeks  Labs drawn from port  Awaiting results

## 2020-01-20 ENCOUNTER — HOSPITAL ENCOUNTER (OUTPATIENT)
Dept: INFUSION CENTER | Facility: HOSPITAL | Age: 35
Discharge: HOME | End: 2020-01-20
Payer: COMMERCIAL

## 2020-01-20 VITALS
OXYGEN SATURATION: 99 % | DIASTOLIC BLOOD PRESSURE: 54 MMHG | RESPIRATION RATE: 16 BRPM | SYSTOLIC BLOOD PRESSURE: 108 MMHG | TEMPERATURE: 98.8 F | HEART RATE: 92 BPM

## 2020-01-20 DIAGNOSIS — E87.6 HYPOKALEMIA: Primary | ICD-10-CM

## 2020-01-20 LAB
ANION GAP SERPL CALCULATED.3IONS-SCNC: 10 MMOL/L (ref 4–13)
BUN SERPL-MCNC: 12 MG/DL (ref 5–25)
CALCIUM SERPL-MCNC: 7.8 MG/DL (ref 8.3–10.1)
CHLORIDE SERPL-SCNC: 108 MMOL/L (ref 100–108)
CO2 SERPL-SCNC: 24 MMOL/L (ref 21–32)
CREAT SERPL-MCNC: 0.77 MG/DL (ref 0.6–1.3)
GFR SERPL CREATININE-BSD FRML MDRD: 100 ML/MIN/1.73SQ M
GLUCOSE SERPL-MCNC: 76 MG/DL (ref 65–140)
POTASSIUM SERPL-SCNC: 3 MMOL/L (ref 3.5–5.3)
SODIUM SERPL-SCNC: 142 MMOL/L (ref 136–145)

## 2020-01-20 PROCEDURE — 80048 BASIC METABOLIC PNL TOTAL CA: CPT | Performed by: INTERNAL MEDICINE

## 2020-01-20 PROCEDURE — 96366 THER/PROPH/DIAG IV INF ADDON: CPT

## 2020-01-20 PROCEDURE — 96365 THER/PROPH/DIAG IV INF INIT: CPT

## 2020-01-20 RX ORDER — SODIUM CHLORIDE 9 MG/ML
20 INJECTION, SOLUTION INTRAVENOUS CONTINUOUS
Status: DISCONTINUED | OUTPATIENT
Start: 2020-01-20 | End: 2020-01-24 | Stop reason: HOSPADM

## 2020-01-20 RX ORDER — HYDROXYZINE 50 MG/1
50 TABLET, FILM COATED ORAL 3 TIMES DAILY PRN
COMMUNITY
End: 2020-03-01

## 2020-01-20 RX ORDER — SODIUM CHLORIDE 9 MG/ML
20 INJECTION, SOLUTION INTRAVENOUS CONTINUOUS
Status: CANCELLED
Start: 2020-01-23

## 2020-01-20 RX ADMIN — POTASSIUM CHLORIDE: 2 INJECTION, SOLUTION, CONCENTRATE INTRAVENOUS at 09:22
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
## 2020-01-22 RX ORDER — LIDOCAINE AND PRILOCAINE 25; 25 MG/G; MG/G
CREAM TOPICAL
Qty: 30 G | Refills: 3 | Status: SHIPPED | OUTPATIENT
Start: 2020-01-22 | End: 2020-03-01

## 2020-01-23 ENCOUNTER — HOSPITAL ENCOUNTER (OUTPATIENT)
Dept: INFUSION CENTER | Facility: HOSPITAL | Age: 35
Discharge: HOME | End: 2020-01-23
Payer: COMMERCIAL

## 2020-01-23 VITALS
HEART RATE: 91 BPM | TEMPERATURE: 97.9 F | SYSTOLIC BLOOD PRESSURE: 111 MMHG | OXYGEN SATURATION: 100 % | RESPIRATION RATE: 18 BRPM | DIASTOLIC BLOOD PRESSURE: 53 MMHG

## 2020-01-23 DIAGNOSIS — E87.6 HYPOKALEMIA: Primary | ICD-10-CM

## 2020-01-23 LAB
ANION GAP SERPL CALCULATED.3IONS-SCNC: 7 MMOL/L (ref 4–13)
BUN SERPL-MCNC: 10 MG/DL (ref 5–25)
CALCIUM SERPL-MCNC: 8 MG/DL (ref 8.3–10.1)
CHLORIDE SERPL-SCNC: 108 MMOL/L (ref 100–108)
CO2 SERPL-SCNC: 24 MMOL/L (ref 21–32)
CREAT SERPL-MCNC: 0.78 MG/DL (ref 0.6–1.3)
GFR SERPL CREATININE-BSD FRML MDRD: 99 ML/MIN/1.73SQ M
GLUCOSE SERPL-MCNC: 70 MG/DL (ref 65–140)
POTASSIUM SERPL-SCNC: 3.7 MMOL/L (ref 3.5–5.3)
SODIUM SERPL-SCNC: 139 MMOL/L (ref 136–145)

## 2020-01-23 PROCEDURE — 96366 THER/PROPH/DIAG IV INF ADDON: CPT

## 2020-01-23 PROCEDURE — 96365 THER/PROPH/DIAG IV INF INIT: CPT

## 2020-01-23 PROCEDURE — 80048 BASIC METABOLIC PNL TOTAL CA: CPT | Performed by: INTERNAL MEDICINE

## 2020-01-23 RX ORDER — SODIUM CHLORIDE 9 MG/ML
20 INJECTION, SOLUTION INTRAVENOUS CONTINUOUS
Status: DISCONTINUED | OUTPATIENT
Start: 2020-01-23 | End: 2020-01-26 | Stop reason: HOSPADM

## 2020-01-23 RX ORDER — SODIUM CHLORIDE 9 MG/ML
20 INJECTION, SOLUTION INTRAVENOUS CONTINUOUS
Status: CANCELLED
Start: 2020-01-27

## 2020-01-23 RX ADMIN — SODIUM CHLORIDE 20 ML/HR: 0.9 INJECTION, SOLUTION INTRAVENOUS at 09:33

## 2020-01-23 RX ADMIN — POTASSIUM CHLORIDE: 2 INJECTION, SOLUTION, CONCENTRATE INTRAVENOUS at 09:38

## 2020-01-23 NOTE — PLAN OF CARE
Problem: INFECTION - ADULT  Goal: Absence or prevention of progression during hospitalization  Description  INTERVENTIONS:  - Assess and monitor for signs and symptoms of infection  - Monitor lab/diagnostic results  - Monitor all insertion sites, i e  indwelling lines, tubes, and drains  - Monitor endotracheal if appropriate and nasal secretions for changes in amount and color  - [place] appropriate cooling/warming therapies per order  - Administer medications as ordered  - Instruct and encourage patient and family to use good hand hygiene technique  - Identify and instruct in appropriate isolation precautions for identified infection/condition  Outcome: Progressing     Problem: Knowledge Deficit  Goal: Patient/family/caregiver demonstrates understanding of disease process, treatment plan, medications, and discharge instructions  Description  Complete learning assessment and assess knowledge base    Interventions:  - Provide teaching at level of understanding  - Provide teaching via preferred learning methods  Outcome: Progressing

## 2020-01-23 NOTE — PROGRESS NOTES
1408 Patient tolerated infusion well and she was discharged in stable condition  She was unable to stay for entire infusion due to  issue

## 2020-01-27 ENCOUNTER — HOSPITAL ENCOUNTER (OUTPATIENT)
Dept: INFUSION CENTER | Facility: HOSPITAL | Age: 35
Discharge: HOME | End: 2020-01-27
Payer: COMMERCIAL

## 2020-01-27 VITALS
DIASTOLIC BLOOD PRESSURE: 55 MMHG | SYSTOLIC BLOOD PRESSURE: 132 MMHG | OXYGEN SATURATION: 100 % | RESPIRATION RATE: 18 BRPM | HEART RATE: 97 BPM | TEMPERATURE: 98.8 F

## 2020-01-27 DIAGNOSIS — E87.6 HYPOKALEMIA: Primary | ICD-10-CM

## 2020-01-27 LAB
ANION GAP SERPL CALCULATED.3IONS-SCNC: 10 MMOL/L (ref 4–13)
BUN SERPL-MCNC: 9 MG/DL (ref 5–25)
CALCIUM SERPL-MCNC: 7.7 MG/DL (ref 8.3–10.1)
CHLORIDE SERPL-SCNC: 106 MMOL/L (ref 100–108)
CO2 SERPL-SCNC: 23 MMOL/L (ref 21–32)
CREAT SERPL-MCNC: 0.78 MG/DL (ref 0.6–1.3)
GFR SERPL CREATININE-BSD FRML MDRD: 99 ML/MIN/1.73SQ M
GLUCOSE SERPL-MCNC: 68 MG/DL (ref 65–140)
POTASSIUM SERPL-SCNC: 3.1 MMOL/L (ref 3.5–5.3)
SODIUM SERPL-SCNC: 139 MMOL/L (ref 136–145)

## 2020-01-27 PROCEDURE — 80048 BASIC METABOLIC PNL TOTAL CA: CPT | Performed by: INTERNAL MEDICINE

## 2020-01-27 PROCEDURE — 96365 THER/PROPH/DIAG IV INF INIT: CPT

## 2020-01-27 PROCEDURE — 96366 THER/PROPH/DIAG IV INF ADDON: CPT

## 2020-01-27 RX ORDER — SODIUM CHLORIDE 9 MG/ML
20 INJECTION, SOLUTION INTRAVENOUS CONTINUOUS
Status: CANCELLED
Start: 2020-01-30

## 2020-01-27 RX ORDER — SODIUM CHLORIDE 9 MG/ML
20 INJECTION, SOLUTION INTRAVENOUS CONTINUOUS
Status: DISCONTINUED | OUTPATIENT
Start: 2020-01-27 | End: 2020-01-30 | Stop reason: HOSPADM

## 2020-01-27 RX ADMIN — POTASSIUM CHLORIDE: 2 INJECTION, SOLUTION, CONCENTRATE INTRAVENOUS at 10:24

## 2020-01-27 NOTE — PLAN OF CARE
Problem: INFECTION - ADULT  Goal: Absence or prevention of progression during hospitalization  Description  INTERVENTIONS:  - Assess and monitor for signs and symptoms of infection  - Monitor lab/diagnostic results  - Monitor all insertion sites, i e  indwelling lines, tubes, and drains  - Monitor endotracheal if appropriate and nasal secretions for changes in amount and color  - [place] appropriate cooling/warming therapies per order  - Administer medications as ordered  - Instruct and encourage patient and family to use good hand hygiene technique  - Identify and instruct in appropriate isolation precautions for identified infection/condition  Outcome: Progressing     Problem: Knowledge Deficit  Goal: Patient/family/caregiver demonstrates understanding of disease process, treatment plan, medications, and discharge instructions  Description  Complete learning assessment and assess knowledge base    Interventions:  - Provide teaching at level of understanding  - Provide teaching via preferred learning methods  Outcome: Progressing

## 2020-01-30 ENCOUNTER — HOSPITAL ENCOUNTER (OUTPATIENT)
Dept: INFUSION CENTER | Facility: HOSPITAL | Age: 35
Discharge: HOME | End: 2020-01-30
Payer: COMMERCIAL

## 2020-01-30 VITALS
HEART RATE: 85 BPM | TEMPERATURE: 97.9 F | SYSTOLIC BLOOD PRESSURE: 118 MMHG | RESPIRATION RATE: 18 BRPM | OXYGEN SATURATION: 100 % | DIASTOLIC BLOOD PRESSURE: 59 MMHG

## 2020-01-30 DIAGNOSIS — E87.6 HYPOKALEMIA: Primary | ICD-10-CM

## 2020-01-30 LAB
ANION GAP SERPL CALCULATED.3IONS-SCNC: 10 MMOL/L (ref 4–13)
BUN SERPL-MCNC: 8 MG/DL (ref 5–25)
CALCIUM SERPL-MCNC: 8.3 MG/DL (ref 8.3–10.1)
CHLORIDE SERPL-SCNC: 105 MMOL/L (ref 100–108)
CO2 SERPL-SCNC: 24 MMOL/L (ref 21–32)
CREAT SERPL-MCNC: 0.81 MG/DL (ref 0.6–1.3)
GFR SERPL CREATININE-BSD FRML MDRD: 94 ML/MIN/1.73SQ M
GLUCOSE SERPL-MCNC: 82 MG/DL (ref 65–140)
POTASSIUM SERPL-SCNC: 3.7 MMOL/L (ref 3.5–5.3)
SODIUM SERPL-SCNC: 139 MMOL/L (ref 136–145)

## 2020-01-30 PROCEDURE — 96365 THER/PROPH/DIAG IV INF INIT: CPT

## 2020-01-30 PROCEDURE — 80048 BASIC METABOLIC PNL TOTAL CA: CPT | Performed by: INTERNAL MEDICINE

## 2020-01-30 PROCEDURE — 96366 THER/PROPH/DIAG IV INF ADDON: CPT

## 2020-01-30 RX ORDER — SODIUM CHLORIDE 9 MG/ML
20 INJECTION, SOLUTION INTRAVENOUS CONTINUOUS
Status: DISCONTINUED | OUTPATIENT
Start: 2020-01-30 | End: 2020-02-02 | Stop reason: HOSPADM

## 2020-01-30 RX ORDER — SODIUM CHLORIDE 9 MG/ML
20 INJECTION, SOLUTION INTRAVENOUS CONTINUOUS
Status: CANCELLED
Start: 2020-02-03

## 2020-01-30 RX ADMIN — POTASSIUM CHLORIDE: 2 INJECTION, SOLUTION, CONCENTRATE INTRAVENOUS at 08:51

## 2020-01-30 NOTE — PLAN OF CARE
Problem: INFECTION - ADULT  Goal: Absence or prevention of progression during hospitalization  Description  INTERVENTIONS:  - Assess and monitor for signs and symptoms of infection  - Monitor lab/diagnostic results  - Monitor all insertion sites, i e  indwelling lines, tubes, and drains  - Administer medications as ordered  - Instruct and encourage patient and family to use good hand hygiene technique   Outcome: Progressing

## 2020-02-03 ENCOUNTER — HOSPITAL ENCOUNTER (OUTPATIENT)
Dept: INFUSION CENTER | Facility: HOSPITAL | Age: 35
Discharge: HOME | End: 2020-02-03
Payer: COMMERCIAL

## 2020-02-03 VITALS
TEMPERATURE: 98.7 F | RESPIRATION RATE: 18 BRPM | HEART RATE: 90 BPM | DIASTOLIC BLOOD PRESSURE: 82 MMHG | SYSTOLIC BLOOD PRESSURE: 124 MMHG

## 2020-02-03 DIAGNOSIS — E87.6 HYPOKALEMIA: Primary | ICD-10-CM

## 2020-02-03 LAB
ANION GAP SERPL CALCULATED.3IONS-SCNC: 7 MMOL/L (ref 4–13)
BUN SERPL-MCNC: 13 MG/DL (ref 5–25)
CALCIUM SERPL-MCNC: 7.9 MG/DL (ref 8.3–10.1)
CHLORIDE SERPL-SCNC: 105 MMOL/L (ref 100–108)
CO2 SERPL-SCNC: 27 MMOL/L (ref 21–32)
CREAT SERPL-MCNC: 0.76 MG/DL (ref 0.6–1.3)
GFR SERPL CREATININE-BSD FRML MDRD: 102 ML/MIN/1.73SQ M
GLUCOSE SERPL-MCNC: 74 MG/DL (ref 65–140)
POTASSIUM SERPL-SCNC: 3.3 MMOL/L (ref 3.5–5.3)
SODIUM SERPL-SCNC: 139 MMOL/L (ref 136–145)

## 2020-02-03 PROCEDURE — 80048 BASIC METABOLIC PNL TOTAL CA: CPT | Performed by: INTERNAL MEDICINE

## 2020-02-03 PROCEDURE — 96365 THER/PROPH/DIAG IV INF INIT: CPT

## 2020-02-03 PROCEDURE — 96366 THER/PROPH/DIAG IV INF ADDON: CPT

## 2020-02-03 RX ORDER — HEPARIN SODIUM (PORCINE) LOCK FLUSH IV SOLN 100 UNIT/ML 100 UNIT/ML
300 SOLUTION INTRAVENOUS ONCE
Status: COMPLETED | OUTPATIENT
Start: 2020-02-03 | End: 2020-02-03

## 2020-02-03 RX ORDER — SODIUM CHLORIDE 9 MG/ML
20 INJECTION, SOLUTION INTRAVENOUS CONTINUOUS
Status: CANCELLED
Start: 2020-02-06

## 2020-02-03 RX ORDER — SODIUM CHLORIDE 9 MG/ML
20 INJECTION, SOLUTION INTRAVENOUS CONTINUOUS
Status: DISCONTINUED | OUTPATIENT
Start: 2020-02-03 | End: 2020-02-06 | Stop reason: HOSPADM

## 2020-02-03 RX ADMIN — Medication 300 UNITS: at 14:19

## 2020-02-03 RX ADMIN — POTASSIUM CHLORIDE: 2 INJECTION, SOLUTION, CONCENTRATE INTRAVENOUS at 10:12

## 2020-02-03 NOTE — PROGRESS NOTES
Patient tolerated infusion well, patient left in stable condition, patient need to leave early and could not finish 60meq due to picking up children from school

## 2020-02-05 ENCOUNTER — TELEPHONE (OUTPATIENT)
Dept: NEPHROLOGY | Facility: CLINIC | Age: 35
End: 2020-02-05

## 2020-02-05 NOTE — TELEPHONE ENCOUNTER
Spoke with Pancho WILLIS at [employer] they are going to approve the hydration from the first approval date of 1/9/20 until 12/9/20  The approval code is [id number]  She stated that they will be faxing over a new paper with this on it

## 2020-02-06 ENCOUNTER — HOSPITAL ENCOUNTER (OUTPATIENT)
Dept: INFUSION CENTER | Facility: HOSPITAL | Age: 35
Discharge: HOME | End: 2020-02-06
Payer: COMMERCIAL

## 2020-02-06 VITALS
TEMPERATURE: 96.7 F | OXYGEN SATURATION: 100 % | SYSTOLIC BLOOD PRESSURE: 121 MMHG | HEART RATE: 90 BPM | DIASTOLIC BLOOD PRESSURE: 65 MMHG | RESPIRATION RATE: 18 BRPM

## 2020-02-06 DIAGNOSIS — E87.6 HYPOKALEMIA: Primary | ICD-10-CM

## 2020-02-06 LAB
ANION GAP SERPL CALCULATED.3IONS-SCNC: 10 MMOL/L (ref 4–13)
BUN SERPL-MCNC: 9 MG/DL (ref 5–25)
CALCIUM SERPL-MCNC: 7.7 MG/DL (ref 8.3–10.1)
CHLORIDE SERPL-SCNC: 105 MMOL/L (ref 100–108)
CO2 SERPL-SCNC: 25 MMOL/L (ref 21–32)
CREAT SERPL-MCNC: 0.81 MG/DL (ref 0.6–1.3)
GFR SERPL CREATININE-BSD FRML MDRD: 94 ML/MIN/1.73SQ M
GLUCOSE SERPL-MCNC: 72 MG/DL (ref 65–140)
POTASSIUM SERPL-SCNC: 3.6 MMOL/L (ref 3.5–5.3)
SODIUM SERPL-SCNC: 140 MMOL/L (ref 136–145)

## 2020-02-06 PROCEDURE — 96366 THER/PROPH/DIAG IV INF ADDON: CPT

## 2020-02-06 PROCEDURE — 80048 BASIC METABOLIC PNL TOTAL CA: CPT | Performed by: INTERNAL MEDICINE

## 2020-02-06 PROCEDURE — 96365 THER/PROPH/DIAG IV INF INIT: CPT

## 2020-02-06 RX ORDER — SODIUM CHLORIDE 9 MG/ML
20 INJECTION, SOLUTION INTRAVENOUS CONTINUOUS
Status: DISCONTINUED | OUTPATIENT
Start: 2020-02-06 | End: 2020-02-09 | Stop reason: HOSPADM

## 2020-02-06 RX ORDER — SODIUM CHLORIDE 9 MG/ML
20 INJECTION, SOLUTION INTRAVENOUS CONTINUOUS
Status: CANCELLED
Start: 2020-02-10

## 2020-02-06 RX ORDER — HEPARIN SODIUM (PORCINE) LOCK FLUSH IV SOLN 100 UNIT/ML 100 UNIT/ML
300 SOLUTION INTRAVENOUS ONCE
Status: COMPLETED | OUTPATIENT
Start: 2020-02-06 | End: 2020-02-06

## 2020-02-06 RX ORDER — HEPARIN SODIUM (PORCINE) LOCK FLUSH IV SOLN 100 UNIT/ML 100 UNIT/ML
300 SOLUTION INTRAVENOUS ONCE
Status: CANCELLED
Start: 2020-02-10

## 2020-02-06 RX ADMIN — POTASSIUM CHLORIDE: 2 INJECTION, SOLUTION, CONCENTRATE INTRAVENOUS at 09:55

## 2020-02-06 RX ADMIN — Medication 300 UNITS: at 14:18

## 2020-02-06 NOTE — PROGRESS NOTES
Patient tolerated infusion well, patient left in stable condition  Patient did not finish infusion due to leaving early for children

## 2020-02-10 ENCOUNTER — HOSPITAL ENCOUNTER (OUTPATIENT)
Dept: INFUSION CENTER | Facility: HOSPITAL | Age: 35
Discharge: HOME | End: 2020-02-10
Payer: COMMERCIAL

## 2020-02-10 VITALS
OXYGEN SATURATION: 100 % | SYSTOLIC BLOOD PRESSURE: 118 MMHG | HEART RATE: 82 BPM | RESPIRATION RATE: 18 BRPM | DIASTOLIC BLOOD PRESSURE: 57 MMHG | TEMPERATURE: 97.4 F

## 2020-02-10 DIAGNOSIS — E87.6 HYPOKALEMIA: Primary | ICD-10-CM

## 2020-02-10 LAB
ANION GAP SERPL CALCULATED.3IONS-SCNC: 7 MMOL/L (ref 4–13)
BUN SERPL-MCNC: 7 MG/DL (ref 5–25)
CALCIUM SERPL-MCNC: 8.2 MG/DL (ref 8.3–10.1)
CHLORIDE SERPL-SCNC: 106 MMOL/L (ref 100–108)
CO2 SERPL-SCNC: 26 MMOL/L (ref 21–32)
CREAT SERPL-MCNC: 0.74 MG/DL (ref 0.6–1.3)
GFR SERPL CREATININE-BSD FRML MDRD: 105 ML/MIN/1.73SQ M
GLUCOSE SERPL-MCNC: 77 MG/DL (ref 65–140)
POTASSIUM SERPL-SCNC: 3.8 MMOL/L (ref 3.5–5.3)
SODIUM SERPL-SCNC: 139 MMOL/L (ref 136–145)

## 2020-02-10 PROCEDURE — 96365 THER/PROPH/DIAG IV INF INIT: CPT

## 2020-02-10 PROCEDURE — 80048 BASIC METABOLIC PNL TOTAL CA: CPT | Performed by: INTERNAL MEDICINE

## 2020-02-10 PROCEDURE — 96366 THER/PROPH/DIAG IV INF ADDON: CPT

## 2020-02-10 RX ORDER — HEPARIN SODIUM (PORCINE) LOCK FLUSH IV SOLN 100 UNIT/ML 100 UNIT/ML
300 SOLUTION INTRAVENOUS ONCE
Status: CANCELLED
Start: 2020-02-13

## 2020-02-10 RX ORDER — SODIUM CHLORIDE 9 MG/ML
20 INJECTION, SOLUTION INTRAVENOUS CONTINUOUS
Status: DISCONTINUED | OUTPATIENT
Start: 2020-02-10 | End: 2020-02-13 | Stop reason: HOSPADM

## 2020-02-10 RX ORDER — SODIUM CHLORIDE 9 MG/ML
20 INJECTION, SOLUTION INTRAVENOUS CONTINUOUS
Status: CANCELLED
Start: 2020-02-13

## 2020-02-10 RX ORDER — HEPARIN SODIUM (PORCINE) LOCK FLUSH IV SOLN 100 UNIT/ML 100 UNIT/ML
300 SOLUTION INTRAVENOUS ONCE
Status: COMPLETED | OUTPATIENT
Start: 2020-02-10 | End: 2020-02-10

## 2020-02-10 RX ADMIN — POTASSIUM CHLORIDE: 2 INJECTION, SOLUTION, CONCENTRATE INTRAVENOUS at 09:56

## 2020-02-10 RX ADMIN — Medication 300 UNITS: at 14:12

## 2020-02-13 ENCOUNTER — HOSPITAL ENCOUNTER (OUTPATIENT)
Dept: INFUSION CENTER | Facility: HOSPITAL | Age: 35
End: 2020-02-13

## 2020-02-17 ENCOUNTER — HOSPITAL ENCOUNTER (OUTPATIENT)
Dept: INFUSION CENTER | Facility: HOSPITAL | Age: 35
Discharge: HOME | End: 2020-02-17

## 2020-02-19 ENCOUNTER — HOSPITAL ENCOUNTER (OUTPATIENT)
Dept: INFUSION CENTER | Facility: HOSPITAL | Age: 35
Discharge: HOME | End: 2020-02-19
Payer: COMMERCIAL

## 2020-02-19 VITALS
DIASTOLIC BLOOD PRESSURE: 60 MMHG | SYSTOLIC BLOOD PRESSURE: 128 MMHG | HEART RATE: 84 BPM | RESPIRATION RATE: 18 BRPM | OXYGEN SATURATION: 100 % | TEMPERATURE: 99.2 F

## 2020-02-19 DIAGNOSIS — E87.6 HYPOKALEMIA: ICD-10-CM

## 2020-02-19 LAB
ANION GAP SERPL CALCULATED.3IONS-SCNC: 14 MMOL/L (ref 4–13)
BUN SERPL-MCNC: 15 MG/DL (ref 5–25)
CALCIUM SERPL-MCNC: 8.3 MG/DL (ref 8.3–10.1)
CHLORIDE SERPL-SCNC: 103 MMOL/L (ref 100–108)
CO2 SERPL-SCNC: 21 MMOL/L (ref 21–32)
CREAT SERPL-MCNC: 1 MG/DL (ref 0.6–1.3)
GFR SERPL CREATININE-BSD FRML MDRD: 73 ML/MIN/1.73SQ M
GLUCOSE SERPL-MCNC: 83 MG/DL (ref 65–140)
POTASSIUM SERPL-SCNC: 3 MMOL/L (ref 3.5–5.3)
SODIUM SERPL-SCNC: 138 MMOL/L (ref 136–145)

## 2020-02-19 PROCEDURE — 96365 THER/PROPH/DIAG IV INF INIT: CPT

## 2020-02-19 PROCEDURE — 80048 BASIC METABOLIC PNL TOTAL CA: CPT | Performed by: INTERNAL MEDICINE

## 2020-02-19 PROCEDURE — 96366 THER/PROPH/DIAG IV INF ADDON: CPT

## 2020-02-19 RX ORDER — HEPARIN SODIUM (PORCINE) LOCK FLUSH IV SOLN 100 UNIT/ML 100 UNIT/ML
300 SOLUTION INTRAVENOUS ONCE
Status: DISCONTINUED | OUTPATIENT
Start: 2020-02-19 | End: 2020-02-22 | Stop reason: HOSPADM

## 2020-02-19 RX ADMIN — POTASSIUM CHLORIDE: 2 INJECTION, SOLUTION, CONCENTRATE INTRAVENOUS at 11:02

## 2020-02-19 NOTE — PROGRESS NOTES
Patient tolerated infusion well, patient left early with mediport accessed  Patient will return tomorrow morning

## 2020-02-20 ENCOUNTER — HOSPITAL ENCOUNTER (OUTPATIENT)
Dept: INFUSION CENTER | Facility: HOSPITAL | Age: 35
Discharge: HOME | End: 2020-02-20
Payer: COMMERCIAL

## 2020-02-20 VITALS
OXYGEN SATURATION: 95 % | HEART RATE: 80 BPM | RESPIRATION RATE: 18 BRPM | SYSTOLIC BLOOD PRESSURE: 122 MMHG | TEMPERATURE: 97.4 F | DIASTOLIC BLOOD PRESSURE: 61 MMHG

## 2020-02-20 DIAGNOSIS — E87.6 HYPOKALEMIA: ICD-10-CM

## 2020-02-20 LAB
ANION GAP SERPL CALCULATED.3IONS-SCNC: 15 MMOL/L (ref 4–13)
BUN SERPL-MCNC: 14 MG/DL (ref 5–25)
CALCIUM SERPL-MCNC: 8.7 MG/DL (ref 8.3–10.1)
CHLORIDE SERPL-SCNC: 103 MMOL/L (ref 100–108)
CO2 SERPL-SCNC: 19 MMOL/L (ref 21–32)
CREAT SERPL-MCNC: 0.97 MG/DL (ref 0.6–1.3)
GFR SERPL CREATININE-BSD FRML MDRD: 75 ML/MIN/1.73SQ M
GLUCOSE SERPL-MCNC: 86 MG/DL (ref 65–140)
POTASSIUM SERPL-SCNC: 3 MMOL/L (ref 3.5–5.3)
SODIUM SERPL-SCNC: 137 MMOL/L (ref 136–145)

## 2020-02-20 PROCEDURE — 96365 THER/PROPH/DIAG IV INF INIT: CPT

## 2020-02-20 PROCEDURE — 80048 BASIC METABOLIC PNL TOTAL CA: CPT | Performed by: INTERNAL MEDICINE

## 2020-02-20 PROCEDURE — 96366 THER/PROPH/DIAG IV INF ADDON: CPT

## 2020-02-20 RX ORDER — HEPARIN SODIUM (PORCINE) LOCK FLUSH IV SOLN 100 UNIT/ML 100 UNIT/ML
300 SOLUTION INTRAVENOUS ONCE
Status: COMPLETED | OUTPATIENT
Start: 2020-02-20 | End: 2020-02-20

## 2020-02-20 RX ADMIN — HEPARIN 300 UNITS: 100 SYRINGE at 15:54

## 2020-02-20 RX ADMIN — POTASSIUM CHLORIDE: 2 INJECTION, SOLUTION, CONCENTRATE INTRAVENOUS at 09:56

## 2020-02-23 RX ORDER — SODIUM CHLORIDE 9 MG/ML
20 INJECTION, SOLUTION INTRAVENOUS ONCE
Status: CANCELLED
Start: 2020-02-27

## 2020-02-23 RX ORDER — HEPARIN SODIUM (PORCINE) LOCK FLUSH IV SOLN 100 UNIT/ML 100 UNIT/ML
300 SOLUTION INTRAVENOUS ONCE
Status: CANCELLED
Start: 2020-02-27

## 2020-02-24 ENCOUNTER — HOSPITAL ENCOUNTER (OUTPATIENT)
Dept: INFUSION CENTER | Facility: HOSPITAL | Age: 35
Discharge: HOME | End: 2020-02-24
Payer: COMMERCIAL

## 2020-02-24 ENCOUNTER — HOSPITAL ENCOUNTER (INPATIENT)
Facility: HOSPITAL | Age: 35
LOS: 3 days | Discharge: HOME | DRG: 641 | End: 2020-02-27
Attending: INTERNAL MEDICINE | Admitting: INTERNAL MEDICINE
Payer: COMMERCIAL

## 2020-02-24 VITALS
HEART RATE: 88 BPM | TEMPERATURE: 98.7 F | RESPIRATION RATE: 18 BRPM | OXYGEN SATURATION: 100 % | DIASTOLIC BLOOD PRESSURE: 73 MMHG | SYSTOLIC BLOOD PRESSURE: 111 MMHG

## 2020-02-24 DIAGNOSIS — E87.6 HYPOKALEMIA: Primary | ICD-10-CM

## 2020-02-24 DIAGNOSIS — E87.2 NORMAL ANION GAP METABOLIC ACIDOSIS: ICD-10-CM

## 2020-02-24 LAB
ANION GAP SERPL CALCULATED.3IONS-SCNC: 12 MMOL/L (ref 4–13)
ANION GAP SERPL CALCULATED.3IONS-SCNC: 16 MMOL/L (ref 4–13)
BUN SERPL-MCNC: 13 MG/DL (ref 5–25)
BUN SERPL-MCNC: 16 MG/DL (ref 5–25)
CALCIUM SERPL-MCNC: 8.1 MG/DL (ref 8.3–10.1)
CALCIUM SERPL-MCNC: 8.3 MG/DL (ref 8.3–10.1)
CHLORIDE SERPL-SCNC: 103 MMOL/L (ref 100–108)
CHLORIDE SERPL-SCNC: 103 MMOL/L (ref 100–108)
CO2 SERPL-SCNC: 20 MMOL/L (ref 21–32)
CO2 SERPL-SCNC: 22 MMOL/L (ref 21–32)
CREAT SERPL-MCNC: 0.95 MG/DL (ref 0.6–1.3)
CREAT SERPL-MCNC: 0.96 MG/DL (ref 0.6–1.3)
GFR SERPL CREATININE-BSD FRML MDRD: 76 ML/MIN/1.73SQ M
GFR SERPL CREATININE-BSD FRML MDRD: 77 ML/MIN/1.73SQ M
GLUCOSE SERPL-MCNC: 78 MG/DL (ref 65–140)
GLUCOSE SERPL-MCNC: 80 MG/DL (ref 65–140)
MAGNESIUM SERPL-MCNC: 2.3 MG/DL (ref 1.6–2.6)
POTASSIUM SERPL-SCNC: 2 MMOL/L (ref 3.5–5.3)
POTASSIUM SERPL-SCNC: 2.1 MMOL/L (ref 3.5–5.3)
SODIUM SERPL-SCNC: 137 MMOL/L (ref 136–145)
SODIUM SERPL-SCNC: 139 MMOL/L (ref 136–145)

## 2020-02-24 PROCEDURE — 80048 BASIC METABOLIC PNL TOTAL CA: CPT | Performed by: INTERNAL MEDICINE

## 2020-02-24 PROCEDURE — 99222 1ST HOSP IP/OBS MODERATE 55: CPT | Performed by: INTERNAL MEDICINE

## 2020-02-24 PROCEDURE — 83735 ASSAY OF MAGNESIUM: CPT | Performed by: INTERNAL MEDICINE

## 2020-02-24 RX ORDER — POTASSIUM CHLORIDE 14.9 MG/ML
20 INJECTION INTRAVENOUS ONCE
Status: COMPLETED | OUTPATIENT
Start: 2020-02-24 | End: 2020-02-24

## 2020-02-24 RX ORDER — AMILORIDE HYDROCHLORIDE 5 MG/1
5 TABLET ORAL DAILY
Status: DISCONTINUED | OUTPATIENT
Start: 2020-02-24 | End: 2020-02-27 | Stop reason: HOSPADM

## 2020-02-24 RX ORDER — ZINC SULFATE 50(220)MG
220 CAPSULE ORAL DAILY
Status: DISCONTINUED | OUTPATIENT
Start: 2020-02-24 | End: 2020-02-27 | Stop reason: HOSPADM

## 2020-02-24 RX ORDER — MECLIZINE HYDROCHLORIDE 25 MG/1
25 TABLET ORAL
Status: DISCONTINUED | OUTPATIENT
Start: 2020-02-24 | End: 2020-02-27 | Stop reason: HOSPADM

## 2020-02-24 RX ORDER — POTASSIUM CHLORIDE AND SODIUM CHLORIDE 900; 300 MG/100ML; MG/100ML
125 INJECTION, SOLUTION INTRAVENOUS CONTINUOUS
Status: DISCONTINUED | OUTPATIENT
Start: 2020-02-24 | End: 2020-02-25

## 2020-02-24 RX ORDER — HYDROXYZINE HYDROCHLORIDE 25 MG/1
50 TABLET, FILM COATED ORAL 3 TIMES DAILY PRN
Status: DISCONTINUED | OUTPATIENT
Start: 2020-02-24 | End: 2020-02-27 | Stop reason: HOSPADM

## 2020-02-24 RX ORDER — SUMATRIPTAN 25 MG/1
100 TABLET, FILM COATED ORAL AS NEEDED
Status: DISCONTINUED | OUTPATIENT
Start: 2020-02-24 | End: 2020-02-27 | Stop reason: HOSPADM

## 2020-02-24 RX ORDER — POTASSIUM CHLORIDE 20 MEQ/1
40 TABLET, EXTENDED RELEASE ORAL
Status: DISCONTINUED | OUTPATIENT
Start: 2020-02-24 | End: 2020-02-24

## 2020-02-24 RX ORDER — POTASSIUM CHLORIDE 14.9 MG/ML
20 INJECTION INTRAVENOUS
Status: COMPLETED | OUTPATIENT
Start: 2020-02-24 | End: 2020-02-25

## 2020-02-24 RX ORDER — AMITRIPTYLINE HYDROCHLORIDE 50 MG/1
50 TABLET, FILM COATED ORAL
Status: DISCONTINUED | OUTPATIENT
Start: 2020-02-24 | End: 2020-02-27 | Stop reason: HOSPADM

## 2020-02-24 RX ORDER — THIAMINE MONONITRATE (VIT B1) 100 MG
100 TABLET ORAL DAILY
Status: DISCONTINUED | OUTPATIENT
Start: 2020-02-24 | End: 2020-02-27 | Stop reason: HOSPADM

## 2020-02-24 RX ORDER — TOPIRAMATE 100 MG/1
100 TABLET, FILM COATED ORAL 2 TIMES DAILY
Status: DISCONTINUED | OUTPATIENT
Start: 2020-02-24 | End: 2020-02-27 | Stop reason: HOSPADM

## 2020-02-24 RX ORDER — POTASSIUM CHLORIDE 20MEQ/15ML
40 LIQUID (ML) ORAL
Status: DISCONTINUED | OUTPATIENT
Start: 2020-02-24 | End: 2020-02-27 | Stop reason: HOSPADM

## 2020-02-24 RX ADMIN — POTASSIUM CHLORIDE 20 MEQ: 14.9 INJECTION, SOLUTION INTRAVENOUS at 12:22

## 2020-02-24 RX ADMIN — TOPIRAMATE 100 MG: 100 TABLET, FILM COATED ORAL at 21:47

## 2020-02-24 RX ADMIN — POTASSIUM CHLORIDE 40 MEQ: 20 SOLUTION ORAL at 17:55

## 2020-02-24 RX ADMIN — POTASSIUM CHLORIDE 20 MEQ: 14.9 INJECTION, SOLUTION INTRAVENOUS at 23:35

## 2020-02-24 RX ADMIN — POTASSIUM CHLORIDE 20 MEQ: 14.9 INJECTION, SOLUTION INTRAVENOUS at 21:44

## 2020-02-24 RX ADMIN — POTASSIUM CHLORIDE 20 MEQ: 14.9 INJECTION, SOLUTION INTRAVENOUS at 19:16

## 2020-02-24 RX ADMIN — POTASSIUM CHLORIDE AND SODIUM CHLORIDE 125 ML/HR: 900; 300 INJECTION, SOLUTION INTRAVENOUS at 23:35

## 2020-02-24 RX ADMIN — POTASSIUM CHLORIDE 40 MEQ: 20 SOLUTION ORAL at 15:21

## 2020-02-24 RX ADMIN — POTASSIUM CHLORIDE 40 MEQ: 20 SOLUTION ORAL at 21:56

## 2020-02-24 RX ADMIN — AMITRIPTYLINE HYDROCHLORIDE 50 MG: 50 TABLET, FILM COATED ORAL at 21:41

## 2020-02-24 RX ADMIN — MECLIZINE HYDROCHLORIDE 25 MG: 25 TABLET ORAL at 21:41

## 2020-02-24 RX ADMIN — POTASSIUM CHLORIDE AND SODIUM CHLORIDE 125 ML/HR: 900; 300 INJECTION, SOLUTION INTRAVENOUS at 12:16

## 2020-02-24 NOTE — PLAN OF CARE
Problem: INFECTION - ADULT  Goal: Absence or prevention of progression during hospitalization  Description  INTERVENTIONS:  - Assess and monitor for signs and symptoms of infection  - Monitor lab/diagnostic results  - Monitor all insertion sites, i e  indwelling lines, tubes, and drains  - Monitor endotracheal if appropriate and nasal secretions for changes in amount and color  - [place] appropriate cooling/warming therapies per order  - Administer medications as ordered  - Instruct and encourage patient and family to use good hand hygiene technique  - Identify and instruct in appropriate isolation precautions for identified infection/condition  Outcome: Progressing  Goal: Absence of fever/infection during neutropenic period  Description  INTERVENTIONS:  - Monitor WBC    Outcome: Progressing     Problem: DISCHARGE PLANNING  Goal: Discharge to home or other facility with appropriate resources  Description  INTERVENTIONS:  - Identify barriers to discharge w/patient and caregiver  - Arrange for needed discharge resources and transportation as appropriate  - Identify discharge learning needs (meds, wound care, etc )  - Arrange for interpretive services to assist at discharge as needed  - Refer to Case Management Department for coordinating discharge planning if the patient needs post-hospital services based on physician/advanced practitioner order or complex needs related to functional status, cognitive ability, or social support system  Outcome: Progressing     Problem: Knowledge Deficit  Goal: Patient/family/caregiver demonstrates understanding of disease process, treatment plan, medications, and discharge instructions  Description  Complete learning assessment and assess knowledge base    Interventions:  - Provide teaching at level of understanding  - Provide teaching via preferred learning methods  Outcome: Progressing

## 2020-02-24 NOTE — PLAN OF CARE
Problem: Nutrition/Hydration-ADULT  Goal: Nutrient/Hydration intake appropriate for improving, restoring or maintaining nutritional needs  Description  Monitor and assess patient's nutrition/hydration status for malnutrition  Collaborate with interdisciplinary team and initiate plan and interventions as ordered  Monitor patient's weight and dietary intake as ordered or per policy  Utilize nutrition screening tool and intervene as necessary  Determine patient's food preferences and provide high-protein, high-caloric foods as appropriate       INTERVENTIONS:  - Monitor oral intake, urinary output, labs, and treatment plans  - Assess nutrition and hydration status and recommend course of action  - Evaluate amount of meals eaten  - Assist patient with eating if necessary   - Allow adequate time for meals  - Recommend/ encourage appropriate diets, oral nutritional supplements, and vitamin/mineral supplements  - Assess need for intravenous fluids  - Provide specific nutrition/hydration education as appropriate  - Include patient/family/caregiver in decisions related to nutrition   Outcome: Progressing     Problem: METABOLIC, FLUID AND ELECTROLYTES - ADULT  Goal: Electrolytes maintained within normal limits  Description  INTERVENTIONS:  - Monitor labs and assess patient for signs and symptoms of electrolyte imbalances  - Administer electrolyte replacement as ordered  - Monitor response to electrolyte replacements, including repeat lab results as appropriate  - Instruct patient on fluid and nutrition as appropriate  Outcome: Progressing     Problem: INFECTION - ADULT  Goal: Absence or prevention of progression during hospitalization  Description  INTERVENTIONS:  - Assess and monitor for signs and symptoms of infection  - Monitor lab/diagnostic results  - Monitor all insertion sites, i e  indwelling lines, tubes, and drains  - Administer medications as ordered  - Instruct and encourage patient and family to use good hand hygiene technique  - Identify and instruct in appropriate isolation precautions for identified infection/condition   Outcome: Progressing     Problem: SAFETY ADULT  Goal: Patient will remain free of falls  Description  INTERVENTIONS:  - Assess patient frequently for physical needs  -  Identify cognitive and physical deficits and behaviors that affect risk of falls    -  [place] fall precautions as indicated by assessment   - Educate patient/family on patient safety including physical limitations  - Instruct patient to call for assistance with activity based on assessment  - Modify environment to reduce risk of injury  - Consider OT/PT consult to assist with strengthening/mobility  Outcome: Progressing  Goal: Maintain or return to baseline ADL function  Description  INTERVENTIONS:  -  Assess patient's ability to carry out ADLs; assess patient's baseline for ADL function and identify physical deficits which impact ability to perform ADLs (bathing, care of mouth/teeth, toileting, grooming, dressing, etc )  - Assess/evaluate cause of self-care deficits   - Assess range of motion  - Assess patient's mobility; develop plan if impaired  - Assess patient's need for assistive devices and provide as appropriate  - Encourage maximum independence but intervene and supervise when necessary  - Involve family in performance of ADLs  - Assess for home care needs following discharge   - Consider OT consult to assist with ADL evaluation and planning for discharge  - Provide patient education as appropriate  Outcome: Progressing  Goal: Maintain or return mobility status to optimal level  Description  INTERVENTIONS:  - Assess patient's baseline mobility status (ambulation, transfers, stairs, etc )    - Identify cognitive and physical deficits and behaviors that affect mobility  - Identify mobility aids required to assist with transfers and/or ambulation (gait belt, sit-to-stand, lift, walker, cane, etc )  - [place] fall precautions as indicated by assessment  - Record patient progress and toleration of activity level on Mobility SBAR; progress patient to next Phase/Stage  - Instruct patient to call for assistance with activity based on assessment  - Consider rehabilitation consult to assist with strengthening/weightbearing, etc   Outcome: Progressing     Problem: DISCHARGE PLANNING  Goal: Discharge to home or other facility with appropriate resources  Description  INTERVENTIONS:  - Identify barriers to discharge w/patient and caregiver  - Arrange for needed discharge resources and transportation as appropriate  - Identify discharge learning needs (meds, wound care, etc )  - Refer to Case Management Department for coordinating discharge planning if the patient needs post-hospital services based on physician/advanced practitioner order or complex needs related to functional status, cognitive ability, or social support system   Outcome: Progressing     Problem: Knowledge Deficit  Goal: Patient/family/caregiver demonstrates understanding of disease process, treatment plan, medications, and discharge instructions  Description  Complete learning assessment and assess knowledge base    Interventions:  - Provide teaching at level of understanding  - Provide teaching via preferred learning methods  Outcome: Progressing

## 2020-02-25 PROBLEM — E87.2 HIGH ANION GAP METABOLIC ACIDOSIS: Status: ACTIVE | Noted: 2020-02-25

## 2020-02-25 PROBLEM — E87.29 HIGH ANION GAP METABOLIC ACIDOSIS: Status: ACTIVE | Noted: 2020-02-25

## 2020-02-25 LAB
ALBUMIN SERPL BCP-MCNC: 3.2 G/DL (ref 3.5–5)
ALP SERPL-CCNC: 40 U/L (ref 46–116)
ALT SERPL W P-5'-P-CCNC: 17 U/L (ref 12–78)
ANION GAP SERPL CALCULATED.3IONS-SCNC: 14 MMOL/L (ref 4–13)
AST SERPL W P-5'-P-CCNC: 22 U/L (ref 5–45)
BILIRUB SERPL-MCNC: 0.4 MG/DL (ref 0.2–1)
BUN SERPL-MCNC: 8 MG/DL (ref 5–25)
CALCIUM SERPL-MCNC: 7.9 MG/DL (ref 8.3–10.1)
CHLORIDE SERPL-SCNC: 111 MMOL/L (ref 100–108)
CO2 SERPL-SCNC: 16 MMOL/L (ref 21–32)
CREAT SERPL-MCNC: 0.88 MG/DL (ref 0.6–1.3)
GFR SERPL CREATININE-BSD FRML MDRD: 85 ML/MIN/1.73SQ M
GLUCOSE SERPL-MCNC: 76 MG/DL (ref 65–140)
MAGNESIUM SERPL-MCNC: 2.2 MG/DL (ref 1.6–2.6)
PHOSPHATE SERPL-MCNC: 2.7 MG/DL (ref 2.7–4.5)
POTASSIUM SERPL-SCNC: 3.5 MMOL/L (ref 3.5–5.3)
PROT SERPL-MCNC: 6.2 G/DL (ref 6.4–8.2)
SODIUM SERPL-SCNC: 141 MMOL/L (ref 136–145)

## 2020-02-25 PROCEDURE — 80053 COMPREHEN METABOLIC PANEL: CPT | Performed by: INTERNAL MEDICINE

## 2020-02-25 PROCEDURE — 83735 ASSAY OF MAGNESIUM: CPT | Performed by: INTERNAL MEDICINE

## 2020-02-25 PROCEDURE — 99232 SBSQ HOSP IP/OBS MODERATE 35: CPT | Performed by: INTERNAL MEDICINE

## 2020-02-25 PROCEDURE — 99254 IP/OBS CNSLTJ NEW/EST MOD 60: CPT | Performed by: NURSE PRACTITIONER

## 2020-02-25 PROCEDURE — 84100 ASSAY OF PHOSPHORUS: CPT | Performed by: INTERNAL MEDICINE

## 2020-02-25 RX ORDER — SODIUM BICARBONATE 650 MG/1
650 TABLET ORAL
Status: DISCONTINUED | OUTPATIENT
Start: 2020-02-25 | End: 2020-02-26

## 2020-02-25 RX ORDER — POTASSIUM CHLORIDE 14.9 MG/ML
20 INJECTION INTRAVENOUS
Status: COMPLETED | OUTPATIENT
Start: 2020-02-25 | End: 2020-02-25

## 2020-02-25 RX ORDER — SODIUM CHLORIDE, SODIUM GLUCONATE, SODIUM ACETATE, POTASSIUM CHLORIDE, MAGNESIUM CHLORIDE, SODIUM PHOSPHATE, DIBASIC, AND POTASSIUM PHOSPHATE .53; .5; .37; .037; .03; .012; .00082 G/100ML; G/100ML; G/100ML; G/100ML; G/100ML; G/100ML; G/100ML
100 INJECTION, SOLUTION INTRAVENOUS CONTINUOUS
Status: DISCONTINUED | OUTPATIENT
Start: 2020-02-25 | End: 2020-02-27

## 2020-02-25 RX ORDER — ACETAMINOPHEN 325 MG/1
650 TABLET ORAL EVERY 6 HOURS PRN
Status: DISCONTINUED | OUTPATIENT
Start: 2020-02-25 | End: 2020-02-27 | Stop reason: HOSPADM

## 2020-02-25 RX ADMIN — AMILORIDE HYDROCLORIDE 5 MG: 5 TABLET ORAL at 10:16

## 2020-02-25 RX ADMIN — POTASSIUM CHLORIDE 40 MEQ: 20 SOLUTION ORAL at 21:23

## 2020-02-25 RX ADMIN — TOPIRAMATE 100 MG: 100 TABLET, FILM COATED ORAL at 10:10

## 2020-02-25 RX ADMIN — ENOXAPARIN SODIUM 40 MG: 40 INJECTION SUBCUTANEOUS at 15:32

## 2020-02-25 RX ADMIN — POTASSIUM CHLORIDE 40 MEQ: 20 SOLUTION ORAL at 15:31

## 2020-02-25 RX ADMIN — POTASSIUM CHLORIDE 20 MEQ: 14.9 INJECTION, SOLUTION INTRAVENOUS at 15:31

## 2020-02-25 RX ADMIN — TOPIRAMATE 100 MG: 100 TABLET, FILM COATED ORAL at 21:22

## 2020-02-25 RX ADMIN — POTASSIUM CHLORIDE 20 MEQ: 14.9 INJECTION, SOLUTION INTRAVENOUS at 15:32

## 2020-02-25 RX ADMIN — Medication 100 MG: at 10:11

## 2020-02-25 RX ADMIN — ZINC SULFATE 220 MG (50 MG) CAPSULE 220 MG: CAPSULE at 10:10

## 2020-02-25 RX ADMIN — POTASSIUM CHLORIDE 40 MEQ: 20 SOLUTION ORAL at 18:11

## 2020-02-25 RX ADMIN — POTASSIUM CHLORIDE 40 MEQ: 20 SOLUTION ORAL at 10:10

## 2020-02-25 RX ADMIN — MECLIZINE HYDROCHLORIDE 25 MG: 25 TABLET ORAL at 21:22

## 2020-02-25 RX ADMIN — POTASSIUM CHLORIDE 40 MEQ: 20 SOLUTION ORAL at 06:07

## 2020-02-25 RX ADMIN — AMITRIPTYLINE HYDROCHLORIDE 50 MG: 50 TABLET, FILM COATED ORAL at 21:22

## 2020-02-25 RX ADMIN — SODIUM CHLORIDE, SODIUM GLUCONATE, SODIUM ACETATE, POTASSIUM CHLORIDE, MAGNESIUM CHLORIDE, SODIUM PHOSPHATE, DIBASIC, AND POTASSIUM PHOSPHATE 100 ML/HR: .53; .5; .37; .037; .03; .012; .00082 INJECTION, SOLUTION INTRAVENOUS at 18:05

## 2020-02-25 RX ADMIN — SODIUM BICARBONATE 650 MG TABLET 650 MG: at 15:42

## 2020-02-25 NOTE — UTILIZATION REVIEW
Initial Clinical Review  Direct [facility] to [facility] med surg unit  Admission: Date/Time/Statement: Admission Orders (From admission, onward)     Ordered        02/24/20 2056  Inpatient Admission  Once                   Orders Placed This Encounter   Procedures    Inpatient Admission     Standing Status:   Standing     Number of Occurrences:   1     Order Specific Question:   Admitting Physician     Answer:   Tiffany Mckeon     Order Specific Question:   Level of Care     Answer:   Med Surg [16]     Order Specific Question:   Estimated length of stay     Answer:   More than 2 Midnights     Order Specific Question:   Certification     Answer:   I certify that inpatient services are medically necessary for this patient for a duration of greater than two midnights  See H&P and MD Progress Notes for additional information about the patient's course of treatment  Assessment/Plan: 39 yo female w/hx recent strep throat, prior gastric bypass, nontraumatic rhabdomyolysis, and hypokalemia requiring IV kcl infusions 2-3 x weekly directly admitted as inpatient from the [facility] to med surg due to recurrent symptomatic hypokalemia  Presented after labs revealed kcl 2 0 associated with BUE weakness and tingling  Exam unremarkable  No imaging or EKG obtained  Plan to replete potassium  2/25: renal consulted, now has high a gap metabolic acidosis of unclear etiology  rechecking kcl & mg levels, checking lactic acid, starting PO na bicarb  Continue kcl repletion    2/25 per renal: extensive workup completed for recurrent hypokalemia for which renal tubular acidosis, renal and gi losses were ruled out  Urine kcl is chronically low  No diuretic abuse or self induced vomiting  She is being cared for as an outpatient by [facility] for genetic testing and directed treatment     Her new metabolic acidosis with hyperchloremia could be from NS IVF, changing to isolyte 100/hr  She describes weak legs, numbness, tingling, oral numbness, and cramping, which are all sx of her prior low potassium     ED Triage Vitals   Temperature Pulse Respirations Blood Pressure SpO2   02/24/20 1045 02/24/20 1045 02/24/20 1045 02/24/20 1045 02/24/20 1045   98 8 °F (37 1 °C) 84 16 124/86 99 %      Temp Source Heart Rate Source Patient Position - Orthostatic VS BP Location FiO2 (%)   02/24/20 1522 -- 02/24/20 1045 02/24/20 1045 --   Oral  Sitting Right arm       Pain Score       02/24/20 1103       No Pain        Wt Readings from Last 1 Encounters:   02/24/20 71 2 kg (156 lb 15 5 oz)     Additional Vital Signs:   Date/Time  Temp  Pulse  Resp  BP  MAP (mmHg)  SpO2    02/25/20 15:35:55  99 2 °F (37 3 °C)  89  18  101/60  74  100 %    02/25/20 05:56:43  97 8 °F (36 6 °C)  --  18  109/65  80  --    02/24/20 22:09:16  98 5 °F (36 9 °C)  70  --  95/55  68  100 %    02/24/20 1923  --  --  --  --  --  99 %    02/24/20 15:22:36  98 6 °F (37 °C)  --  18  130/78  95  --        Pertinent Labs/Diagnostic Test Results:   No rads or EKG's done      Results from last 7 days   Lab Units 02/25/20  0557 02/24/20  1825 02/24/20  1605 02/24/20  0923 02/20/20  0916 02/19/20  1028   SODIUM mmol/L 141  --  137 139 137 138   POTASSIUM mmol/L 3 5  --  2 0* 2 1* 3 0* 3 0*   CHLORIDE mmol/L 111*  --  103 103 103 103   CO2 mmol/L 16*  --  22 20* 19* 21   ANION GAP mmol/L 14*  --  12 16* 15* 14*   BUN mg/dL 8  --  13 16 14 15   CREATININE mg/dL 0 88  --  0 96 0 95 0 97 1 00   EGFR ml/min/1 73sq m 85  --  76 77 75 73   CALCIUM mg/dL 7 9*  --  8 1* 8 3 8 7 8 3   MAGNESIUM mg/dL 2 2 2 3  --   --   --   --    PHOSPHORUS mg/dL 2 7  --   --   --   --   --      Results from last 7 days   Lab Units 02/25/20  0557   AST U/L 22   ALT U/L 17   ALK PHOS U/L 40*   TOTAL PROTEIN g/dL 6 2*   ALBUMIN g/dL 3 2*   TOTAL BILIRUBIN mg/dL 0 40         Results from last 7 days   Lab Units 02/25/20  0557 02/24/20  1605 02/24/20  0755 02/20/20  0916 02/19/20  1028   GLUCOSE RANDOM mg/dL 76 80 78 86 83     Past Medical History:   Diagnosis Date    H  pylori infection     Hypokalemia     Migraine     Renal disorder     Rhabdomyolysis      Present on Admission:   Hypokalemia      Admitting Diagnosis: Hypokalemia [E87 6]  Age/Sex: 38 y o  female  Admission Orders:  Scheduled Medications:    Medications:  AMILoride 5 mg Oral Daily   amitriptyline 50 mg Oral HS   enoxaparin 40 mg Subcutaneous Q24H   meclizine 25 mg Oral HS   potassium chloride 40 mEq Oral 5x Daily   sodium bicarbonate 650 mg Oral BID after meals   thiamine 100 mg Oral Daily   topiramate 100 mg Oral BID   zinc sulfate 220 mg Oral Daily   IV kcl 20meq x 4 on 2/24 @1916, 2144, 2335), x 2 on 2/25 (@1531, 1532)    Continuous IV Infusions:  multi-electrolyte 100 mL/hr Intravenous Continuous   sodium chloride 0 9 % with KCl 40 mEq/L infusion (premix)   Rate: 125 mL/hr Dose: 125 mL/hr  Freq: Continuous Route: IV  Indications of Use: IV Hydration  Last Dose: 125 mL/hr (02/24/20 2335)  Start: 02/24/20 1145 End: 02/25/20 1440    PRN Meds:  acetaminophen 650 mg Oral Q6H PRN   hydrOXYzine HCL 50 mg Oral TID PRN   SUMAtriptan 100 mg Oral PRN     SCD's    IP CONSULT TO NEPHROLOGY    Network Utilization Review Department  [email or web address]  ATTENTION: Please call with any questions or concerns to [phone] and carefully listen to the prompts so that you are directed to the right person  All voicemails are confidential   Jacquelyn Jose all requests for admission clinical reviews, approved or denied determinations and any other requests to dedicated fax number below belonging to the campus where the patient is receiving treatment   List of dedicated fax numbers for the Facilities:  FACILITY NAME UR FAX NUMBER   ADMISSION DENIALS (Administrative/Medical Necessity) [phone]   [address] (Maternity/NICU/Pediatrics) [place] [address] [facility] [phone]   [facility] [phone]   [address] [phone]   [facility]  [phone]   [address] [phone]

## 2020-02-25 NOTE — ASSESSMENT & PLAN NOTE
· Continue to replete with PO and IV potassium chloride supplementation  · Consult Nephrology  · Follow the potassium level and magnesium level

## 2020-02-25 NOTE — PROGRESS NOTES
Progress Note - Kathia Guzman 1985, 38 y o  female MRN: [id number]    Unit/Bed#: 409-01 Encounter: [id number]    Primary Care Provider: Joe De Anda DO   Date and time admitted to hospital: 2020 10:44 AM        * Hypokalemia  Assessment & Plan  · Continue to replete with PO and IV potassium chloride supplementation  · Consult Nephrology  · Follow the potassium level and magnesium level    High anion gap metabolic acidosis  Assessment & Plan  · Secondary to unclear etiology  · Check a lactic acid level  · Initiate sodium bicarbonate 650 mg PO BID  · Consult Nephrology  · Follow the sodium bicarbonate level and anion gap level        VTE Pharmacologic Prophylaxis:   Pharmacologic: Enoxaparin (Lovenox)  Mechanical VTE Prophylaxis in Place: Yes    Patient Centered Rounds: I have performed bedside rounds with nursing staff today  Time Spent for Care: 30 minutes  More than 50% of total time spent on counseling and coordination of care as described above  Current Length of Stay: 1 day(s)    Current Patient Status: Inpatient   Certification Statement: The patient will continue to require additional inpatient hospital stay due to the need for IV potassium chloride supplementation and for serial laboratory testing to monitor her potassium level  Code Status: Level 1 - Full Code      Subjective: The patient was seen and examined  The patient is doing better  No extremity paresthesias  No chest pain  No shortness of breath  No abdominal pain  No nausea or vomiting  Objective:     Vitals:   Temp (24hrs), Av 3 °F (36 8 °C), Min:97 8 °F (36 6 °C), Max:98 6 °F (37 °C)    Temp:  [97 8 °F (36 6 °C)-98 6 °F (37 °C)] 97 8 °F (36 6 °C)  HR:  [70] 70  Resp:  [18] 18  BP: ()/(55-78) 109/65  SpO2:  [99 %-100 %] 100 %  Body mass index is 24 58 kg/m²  Input and Output Summary (last 24 hours):        Intake/Output Summary (Last 24 hours) at 2020 1316  Last data filed at 2020 9386  Gross per 24 hour   Intake 1560 ml   Output --   Net 1560 ml       Physical Exam:     Physical Exam  General:  NAD, follows commands  HEENT:  NC/AT, mucous membranes moist  Neck:  Supple, No JVP elevation  CV:  + S1, + S2, RRR  Pulm:  Lung fields are CTA bilaterally  Abd:  Soft, Non-tender, Non-distended  Ext:  No clubbing/cyanosis/edema  Skin:  No rashes  Neuro:  Awake, alert, oriented  Psych:  Normal mood and affect      Additional Data:    Labs:        Results from last 7 days   Lab Units 02/25/20  0557   SODIUM mmol/L 141   POTASSIUM mmol/L 3 5   CHLORIDE mmol/L 111*   CO2 mmol/L 16*   BUN mg/dL 8   CREATININE mg/dL 0 88   ANION GAP mmol/L 14*   CALCIUM mg/dL 7 9*   ALBUMIN g/dL 3 2*   TOTAL BILIRUBIN mg/dL 0 40   ALK PHOS U/L 40*   ALT U/L 17   AST U/L 22   GLUCOSE RANDOM mg/dL 76                           * I Have Reviewed All Lab Data Listed Above  * Additional Pertinent Lab Tests Reviewed:  Veto 66 Admission Reviewed      Recent Cultures (last 7 days):           Last 24 Hours Medication List:     Current Facility-Administered Medications:  acetaminophen 650 mg Oral Q6H PRN Aram Etienne, DO    AMILoride 5 mg Oral Daily Royden Susie, DO    amitriptyline 50 mg Oral HS Brandt Jarrett, DO    enoxaparin 40 mg Subcutaneous Q24H Aram Etienne, DO    hydrOXYzine HCL 50 mg Oral TID PRN Royden Susie, DO    meclizine 25 mg Oral HS Royden Susie, DO    potassium chloride 40 mEq Oral 5x Daily Royden Susie, DO    potassium chloride 20 mEq Intravenous Q2H Aram Etienne, DO    sodium bicarbonate 650 mg Oral BID after meals Aram Etienne, DO    sodium chloride 0 9 % with KCl 40 mEq/L 125 mL/hr Intravenous Continuous Royden Susie, DO Last Rate: 125 mL/hr (02/24/20 5325)   SUMAtriptan 100 mg Oral PRN Royden Susie, DO    thiamine 100 mg Oral Daily Royden Susie, DO    topiramate 100 mg Oral BID Royden Susie, DO    zinc sulfate 220 mg Oral Daily Royden Susie, DO         Today, Patient Was Seen By: Fede Poon DO    ** Please Note: Dictation voice to text software may have been used in the creation of this document   **

## 2020-02-25 NOTE — CONSULTS
Kaycee Patino 38 y o  female MRN: [id number]  Unit/Bed#: 409-01 Encounter: [id number]        Assessment and Plan:    1  Acute on chronic hypokalemia  · First documented in July 2018 and has had extensive workup in the past regarding this issue, including ruling out renal tubular acidosis, renal losses, GI losses with a normal colonoscopy in the past   Her urine potassium is chronically low  There is no evidence of diuretic abuse or self-induced vomiting   · This morning potassium 3 5 millimoles per L  · Continue 40 mEq oral potassium 5 times a day  · Continue amiloride 5 mg daily  · Replete IV potassium riders as necessary for potassium 3 8-4 0 millimoles per L  · Continue outpatient infusions  · Per patient, coordination in place as outpatient with [facility] for genetic testing and genetic directed treatment  2  Hyperchloremic Anion Gap Metabolic acidosis  · Has previously required sodium bicarb tablets  · New metabolic acidosis with hyperchloremia may be result of normal saline infusion  Will switch to isolyte at 100 mL an hour and continue to monitor  3  Roshan EN Y Gastric bypass  · Performed in 2015  · Patient's acute on chronic hypokalemia has not been labeled as an absorption problem  4  Recent strep throat  · Status post treatment with azithromycin  5  Weakness  · Weakness of legs, numbness, tingling, especially oral numbness, cramping are all symptoms patient frequently describes when her potassium is low  · Has had nontraumatic rhabdomyolysis in the past, will check CK level      HPI:    Regina Reese is a 38 y o  female with an active problem list significant for gastric bypass, acute on chronic hypokalemia, migraine, who is known to this service for persistent and severe hypokalemia  She has been worked up for this at several facilities including [address] [place] [address]    Per my discussion with the patient, she is also currently in the process of being enrolled in genetic testing and genetic directed therapy  Her potassium is typically maintained on 200 mEq p o  At home in addition to intravenous infusions of potassium multiple times per week  Unfortunately, she recently was treated for strep throat and because her febrile illness she missed some potassium infusions  She was also limited on what she could eat due to her severe sore throat  She was symptomatic with hypokalemia of 2 0 mmol/L on 2020 at the infusion center and was [facility] for aggressive potassium repletion  She has received her standing dose of oral potassium but is also receiving multiple doses of IV potassium as well  Today, she has a new hyperchloremic metabolic acidosis  This may be secondary to normal saline infusion  Upon review of her records, she previously had metabolic acidosis for which was treated with sodium bicarbonate tablets as an outpatient  A full workup is in process including VBG, CK, lactic acid, procalcitonin, TSH, vitamin-D  Upon discussion with the patient, she denies any nausea, vomiting, diarrhea  She states that her recent illness was limited to strep throat, no other issues  No sick contacts  No chest pain, shortness of breath, dizziness, numbness, tingling today  No dysuria, urgency, frequency  No alcohol ingestion  The medical record, including Care Everywhere and media tabs were reviewed  Reason for Consult:  Acute on chronic hypokalemia    Review of Systems:  A complete 10-point review of systems was performed  Aside from what was mentioned in the HPI, it is otherwise negative        Historical Information   Past Medical History:   Diagnosis Date    H  pylori infection     Hypokalemia     Migraine     Renal disorder     Rhabdomyolysis      Past Surgical History:   Procedure Laterality Date    ABDOMINAL SURGERY      APPENDECTOMY       SECTION      CHOLECYSTECTOMY      COSMETIC SURGERY  FL GUIDED CENTRAL VENOUS ACCESS DEVICE INSERTION  12/21/2018    GASTRIC BYPASS      HYSTERECTOMY      TUNNELED VENOUS PORT PLACEMENT N/A 12/21/2018    Procedure: INSERTION VENOUS PORT (PORT-A-CATH); Surgeon: Lurdes Berg DO;  Location: [facility];  Service: General     Social History   Social History     Substance and Sexual Activity   Alcohol Use Never    Alcohol/week: 0 0 standard drinks    Frequency: Never    Drinks per session: Patient refused    Binge frequency: Never    Comment: 0     Social History     Substance and Sexual Activity   Drug Use No     Social History     Tobacco Use   Smoking Status Never Smoker   Smokeless Tobacco Never Used       Family History:   Family History   Problem Relation Age of Onset    Hypertension Father     Diabetes Father     Diabetes Maternal Grandfather        Medications:  Pertinent medications were reviewed    Current Facility-Administered Medications:  acetaminophen 650 mg Oral Q6H PRN [place] Gibes, DO    AMILoride 5 mg Oral Daily Kathleen Chavarria, DO    amitriptyline 50 mg Oral HS Brandt Luiza, DO    enoxaparin 40 mg Subcutaneous Q24H [place] Gibes, DO    hydrOXYzine HCL 50 mg Oral TID PRN Kathleen Chavarria, DO    meclizine 25 mg Oral HS Brandt Luiza, DO    potassium chloride 40 mEq Oral 5x Daily Brandt Luiza, DO    potassium chloride 20 mEq Intravenous Q2H [place] Gibes, DO    sodium bicarbonate 650 mg Oral BID after meals Brooklynn Gibes, DO    sodium chloride 0 9 % with KCl 40 mEq/L 125 mL/hr Intravenous Continuous Kathleen Chavarria, DO Last Rate: 125 mL/hr (02/24/20 0015)   SUMAtriptan 100 mg Oral PRN Kathleen Chavarria, DO    thiamine 100 mg Oral Daily Brandt Luiza, DO    topiramate 100 mg Oral BID Kathleen Chavarria, DO    zinc sulfate 220 mg Oral Daily Kathleen Chavarria, DO          Allergies   Allergen Reactions    Nsaids Other (See Comments)     Other reaction(s): Other (Please comment)  Should not take s/p bariatric surgery  Other reaction(s):  Other (See Comments)  Should not take as per prior records  Should not take s/p bariatric surgery  Has hx of gastric bypass      Prednisone      Nausea, vomiting, diarrhea         Vitals:   /65   Pulse 70   Temp 97 8 °F (36 6 °C)   Resp 18   Ht 5' 7" (1 702 m)   Wt 71 2 kg (156 lb 15 5 oz)   SpO2 100%   BMI 24 58 kg/m²   Body mass index is 24 58 kg/m²  SpO2: 100 %,   SpO2 Activity: At Rest,   O2 Device: None (Room air)      Intake/Output Summary (Last 24 hours) at 2/25/2020 1422  Last data filed at 2/25/2020 0910  Gross per 24 hour   Intake 1560 ml   Output --   Net 1560 ml     Invasive Devices     Central Venous Catheter Line            Port A Cath 12/22/18 Right Chest 430 days                Physical Exam:  General: conscious, cooperative, in no acute distress  Eyes: conjunctivae pink, anicteric sclerae  ENT: lips and mucous membranes moist  Neck: supple, no JVD, no masses  Chest: clear breath sounds bilateral, no crackles, ronchus or wheezings  CVS: S1 & S2, normal rate, regular rhythm  Abdomen: soft, non-tender, non-distended, normoactive bowel sounds  Extremities: no edema of both legs  Right chest wall port  Skin: no rash  Neuro: awake, alert, oriented      Diagnostic Data:  Lab: I have personally reviewed pertinent lab results  ,   CBC:       CMP: Lab Results   Component Value Date    SODIUM 141 02/25/2020    K 3 5 02/25/2020     (H) 02/25/2020    CO2 16 (L) 02/25/2020    BUN 8 02/25/2020    CREATININE 0 88 02/25/2020    CALCIUM 7 9 (L) 02/25/2020    AST 22 02/25/2020    ALT 17 02/25/2020    ALKPHOS 40 (L) 02/25/2020    EGFR 85 02/25/2020   ,   PT/INR: No results found for: PT, INR,   Magnesium: No components found for: MAG,  Phosphorous:   Lab Results   Component Value Date    PHOS 2 7 02/25/2020       Microbiology:  @LABRCNTIP,(urinecx:7)@        ARIADNA Ortiz    Portions of the record may have been created with voice recognition software   Occasional wrong word or "sound a like" substitutions may have occurred due to the inherent limitations of voice recognition software  Read the chart carefully and recognize, using context, where substitutions have occurred

## 2020-02-25 NOTE — ASSESSMENT & PLAN NOTE
· Secondary to unclear etiology  · Check a lactic acid level  · Initiate sodium bicarbonate 650 mg PO BID  · Consult Nephrology  · Follow the sodium bicarbonate level and anion gap level

## 2020-02-25 NOTE — SOCIAL WORK
CM met with the patient at bedside to review the CM role and discuss possible dc needs  At time of interview pt is AAOx4, pt lives with her  in a single story home with 0 CARLOS  Pt was IPTA  Pt has no DME  and is ambulatory without assistance  Pt drives  Pt denies any history of drug/etoh abuse, mental illness or inpatient psych admissions  Pt denies any history of SNF/Rehab or VNA  Pt receives infusions 2-3 X/s a week at [facility]    Preferred Pharmacy: [address] [place] [address] Avenue: Rigoberto Westbrook, , [phone]  PCP: Dr Carlos Newman    CM reviewed d/c planning process including the following: identifying help at home, patient preference for d/c planning needs, availability of treatment team to discuss questions or concerns patient and/or family may have regarding understanding medications and recognizing signs and symptoms once discharged  CM also encouraged patient to follow up with all recommended appointments after discharge  Patient advised of importance for patient and family to participate in managing patients medical well being

## 2020-02-25 NOTE — H&P
H&P- Oneida Cochran 1985, 38 y o  female MRN: [id number]    Unit/Bed#: 409-01 Encounter: [id number]    Primary Care Provider: Yunior Lake DO   Date and time admitted to hospital: 2020 10:44 AM        * Hypokalemia  Assessment & Plan  Patient has history of severe recurrent hypokalemia, will replace potassium, continue to monitor  Weakness of both lower extremities  Assessment & Plan  Secondary to hypokalemia    History of gastric bypass  Assessment & Plan  Diet as tolerated  Migraine without aura and without status migrainosus, not intractable  Assessment & Plan  Continue outpatient regimen        Chief Complaint:   Bilateral upper extremity weakness and tingling    History of Present Illness:    Oneida Cochran is a 38 y o  female who presents with symptomatic hypokalemia  Patient is well known to our service, patient presents with symptomatic hypokalemia which is recurrent  Patient typically receives IV potassium infusions in the infusion center 2 to 3 times per week, patient recently had an episode of strep throat, was treated with a course of azithromycin, due to febrile illness patient missed a few infusion sessions  Please note that patient goes to the infusion center 2 to 3 times per week, patient spends approximately 8 hours at the infusion center for each treatment  Review of Systems:    Review of Systems   All other systems reviewed and are negative        Past Medical and Surgical History:     Past Medical History:   Diagnosis Date    H  pylori infection     Hypokalemia     Migraine     Renal disorder     Rhabdomyolysis        Past Surgical History:   Procedure Laterality Date    ABDOMINAL SURGERY      APPENDECTOMY       SECTION      CHOLECYSTECTOMY      COSMETIC SURGERY      FL GUIDED CENTRAL VENOUS ACCESS DEVICE INSERTION  2018    GASTRIC BYPASS      HYSTERECTOMY      TUNNELED VENOUS PORT PLACEMENT N/A 2018    Procedure: Sudhakar Ramey VENOUS PORT (PORT-A-CATH); Surgeon: Nell Randall DO;  Location: [facility];  Service: General       Meds/Allergies:    Prior to Admission medications    Medication Sig Start Date End Date Taking? Authorizing Provider   AMILoride 5 mg tablet Take 1 tablet (5 mg total) by mouth daily 2/20/19  Yes Darrolmarlen Leung DO   amitriptyline (ELAVIL) 50 mg tablet Take 50 mg by mouth daily at bedtime 8/27/18 2/24/20 Yes Historical Provider, MD   b complex-C-folic acid (NEPHRO-EDIE) 0 8 mg tablet Take 0 8 mg by mouth daily with dinner   Yes Historical Provider, MD   ergocalciferol (ERGOCALCIFEROL) 18357 units capsule Take 50,000 Units by mouth once a week Patient takes this med on Mondays    Yes Historical Provider, MD   ferrous sulfate 325 (65 Fe) mg tablet Take 325 mg by mouth every other day Took on 2/24/20   Yes Historical Provider, MD   lidocaine-prilocaine (EMLA) cream APPLY TO AFFECTED AREA 30 MINUTES PRIOR TO NEEDLE PUNCTURE  APPLY A CLEAR PLASTIC WRAP TO THE AREA TO PREVENT REMOVAL OF CREAM BY CLOTHES 1/22/20  Yes Celio MedicoDO   meclizine (ANTIVERT) 25 mg tablet Take 25 mg by mouth daily at bedtime 8/27/18  Yes Historical Provider, MD   potassium chloride 10 % oral solution Take 30 mL (40 mEq total) by mouth 5 (five) times a day 10/2/19 2/24/20 Yes ARIADNA Woo   SUMAtriptan (IMITREX) 100 mg tablet Take 100 mg by mouth daily at bedtime    Yes Historical Provider, MD   thiamine 100 MG tablet Take 100 mg by mouth daily   Yes Historical Provider, MD   topiramate (TOPAMAX) 100 mg tablet Take 100 mg by mouth 2 (two) times a day   Yes Historical Provider, MD   zinc sulfate (ZINCATE) 220 mg capsule Take 1 capsule (220 mg total) by mouth daily 8/9/19  Yes Michelle Dyer MD   hydrOXYzine HCL (ATARAX) 50 mg tablet Take 50 mg by mouth 3 (three) times a day as needed for itching    Historical Provider, MD     I have reviewed home medications with patient personally  Allergies:    Allergies   Allergen Reactions    Nsaids Other (See Comments)     Other reaction(s): Other (Please comment)  Should not take s/p bariatric surgery  Other reaction(s): Other (See Comments)  Should not take as per prior records  Should not take s/p bariatric surgery  Has hx of gastric bypass      Prednisone      Nausea, vomiting, diarrhea       Social History:     Marital Status: /Civil Union   Substance Use History:   Social History     Substance and Sexual Activity   Alcohol Use Never    Alcohol/week: 0 0 standard drinks    Frequency: Never    Drinks per session: Patient refused    Binge frequency: Never    Comment: 0     Social History     Tobacco Use   Smoking Status Never Smoker   Smokeless Tobacco Never Used     Social History     Substance and Sexual Activity   Drug Use No       Family History:    non-contributory    Physical Exam:     Vitals:   Blood Pressure: 130/78 (02/24/20 1522)  Pulse: 84 (02/24/20 1045)  Temperature: 98 6 °F (37 °C) (02/24/20 1522)  Temp Source: Oral (02/24/20 1522)  Respirations: 18 (02/24/20 1522)  Height: 5' 7" (170 2 cm) (02/24/20 1045)  Weight - Scale: 71 2 kg (156 lb 15 5 oz) (02/24/20 1045)  SpO2: 99 % (02/24/20 1923)    Physical Exam   Constitutional: She is oriented to person, place, and time  She appears well-developed and well-nourished  No distress  Cardiovascular: Normal rate, regular rhythm, normal heart sounds and intact distal pulses  Pulmonary/Chest: Effort normal and breath sounds normal  No stridor  No respiratory distress  Abdominal: Soft  Bowel sounds are normal    Neurological: She is alert and oriented to person, place, and time  She displays normal reflexes  No cranial nerve deficit  Coordination normal    Skin: Skin is warm and dry  She is not diaphoretic  No erythema  No pallor  Nursing note and vitals reviewed  Additional Data:     Lab Results: I have personally reviewed pertinent reports            Results from last 7 days   Lab Units 02/24/20  0564 SODIUM mmol/L 137   POTASSIUM mmol/L 2 0*   CHLORIDE mmol/L 103   CO2 mmol/L 22   BUN mg/dL 13   CREATININE mg/dL 0 96   ANION GAP mmol/L 12   CALCIUM mg/dL 8 1*   GLUCOSE RANDOM mg/dL 80                       Imaging: I have personally reviewed pertinent reports        No orders to display

## 2020-02-26 PROBLEM — E87.20 NORMAL ANION GAP METABOLIC ACIDOSIS: Status: ACTIVE | Noted: 2020-02-25

## 2020-02-26 LAB
25(OH)D3 SERPL-MCNC: 16.9 NG/ML (ref 30–100)
ALBUMIN SERPL BCP-MCNC: 3 G/DL (ref 3.5–5)
ALP SERPL-CCNC: 43 U/L (ref 46–116)
ALT SERPL W P-5'-P-CCNC: 21 U/L (ref 12–78)
ANION GAP SERPL CALCULATED.3IONS-SCNC: 12 MMOL/L (ref 4–13)
ANION GAP SERPL CALCULATED.3IONS-SCNC: 14 MMOL/L (ref 4–13)
ARTERIAL PATENCY WRIST A: NO
AST SERPL W P-5'-P-CCNC: 18 U/L (ref 5–45)
BASE EX.OXY STD BLDV CALC-SCNC: 93.5 % (ref 60–80)
BASE EXCESS BLDV CALC-SCNC: -13.4 MMOL/L
BASOPHILS # BLD AUTO: 0.06 THOUSANDS/ΜL (ref 0–0.1)
BASOPHILS NFR BLD AUTO: 1 % (ref 0–1)
BILIRUB SERPL-MCNC: 0.2 MG/DL (ref 0.2–1)
BUN SERPL-MCNC: 8 MG/DL (ref 5–25)
BUN SERPL-MCNC: 9 MG/DL (ref 5–25)
CALCIUM SERPL-MCNC: 7.5 MG/DL (ref 8.3–10.1)
CALCIUM SERPL-MCNC: 7.6 MG/DL (ref 8.3–10.1)
CHLORIDE SERPL-SCNC: 113 MMOL/L (ref 100–108)
CHLORIDE SERPL-SCNC: 114 MMOL/L (ref 100–108)
CK SERPL-CCNC: 94 U/L (ref 26–192)
CO2 SERPL-SCNC: 14 MMOL/L (ref 21–32)
CO2 SERPL-SCNC: 16 MMOL/L (ref 21–32)
CREAT SERPL-MCNC: 0.75 MG/DL (ref 0.6–1.3)
CREAT SERPL-MCNC: 0.76 MG/DL (ref 0.6–1.3)
EOSINOPHIL # BLD AUTO: 0.14 THOUSAND/ΜL (ref 0–0.61)
EOSINOPHIL NFR BLD AUTO: 3 % (ref 0–6)
ERYTHROCYTE [DISTWIDTH] IN BLOOD BY AUTOMATED COUNT: 14 % (ref 11.6–15.1)
GFR SERPL CREATININE-BSD FRML MDRD: 101 ML/MIN/1.73SQ M
GFR SERPL CREATININE-BSD FRML MDRD: 103 ML/MIN/1.73SQ M
GLUCOSE SERPL-MCNC: 84 MG/DL (ref 65–140)
GLUCOSE SERPL-MCNC: 91 MG/DL (ref 65–140)
HCO3 BLDV-SCNC: 13.4 MMOL/L (ref 24–30)
HCT VFR BLD AUTO: 34.1 % (ref 34.8–46.1)
HGB BLD-MCNC: 11 G/DL (ref 11.5–15.4)
IMM GRANULOCYTES # BLD AUTO: 0.01 THOUSAND/UL (ref 0–0.2)
IMM GRANULOCYTES NFR BLD AUTO: 0 % (ref 0–2)
LACTATE SERPL-SCNC: 0.6 MMOL/L (ref 0.5–2)
LYMPHOCYTES # BLD AUTO: 1.88 THOUSANDS/ΜL (ref 0.6–4.47)
LYMPHOCYTES NFR BLD AUTO: 39 % (ref 14–44)
MAGNESIUM SERPL-MCNC: 2 MG/DL (ref 1.6–2.6)
MCH RBC QN AUTO: 30.1 PG (ref 26.8–34.3)
MCHC RBC AUTO-ENTMCNC: 32.3 G/DL (ref 31.4–37.4)
MCV RBC AUTO: 93 FL (ref 82–98)
MONOCYTES # BLD AUTO: 0.4 THOUSAND/ΜL (ref 0.17–1.22)
MONOCYTES NFR BLD AUTO: 8 % (ref 4–12)
NEUTROPHILS # BLD AUTO: 2.37 THOUSANDS/ΜL (ref 1.85–7.62)
NEUTS SEG NFR BLD AUTO: 49 % (ref 43–75)
NRBC BLD AUTO-RTO: 0 /100 WBCS
O2 CT BLDV-SCNC: 15.9 ML/DL
PCO2 BLDV: 33.9 MM HG (ref 42–50)
PH BLDV: 7.21 [PH] (ref 7.3–7.4)
PHOSPHATE SERPL-MCNC: 2.6 MG/DL (ref 2.7–4.5)
PLATELET # BLD AUTO: 233 THOUSANDS/UL (ref 149–390)
PMV BLD AUTO: 10.6 FL (ref 8.9–12.7)
PO2 BLDV: 84.2 MM HG (ref 35–45)
POTASSIUM SERPL-SCNC: 3.1 MMOL/L (ref 3.5–5.3)
POTASSIUM SERPL-SCNC: 3.4 MMOL/L (ref 3.5–5.3)
PROCALCITONIN SERPL-MCNC: <0.05 NG/ML
PROT SERPL-MCNC: 5.9 G/DL (ref 6.4–8.2)
RBC # BLD AUTO: 3.66 MILLION/UL (ref 3.81–5.12)
SODIUM SERPL-SCNC: 141 MMOL/L (ref 136–145)
SODIUM SERPL-SCNC: 142 MMOL/L (ref 136–145)
TSH SERPL DL<=0.05 MIU/L-ACNC: 1.12 UIU/ML (ref 0.36–3.74)
WBC # BLD AUTO: 4.86 THOUSAND/UL (ref 4.31–10.16)

## 2020-02-26 PROCEDURE — 83735 ASSAY OF MAGNESIUM: CPT | Performed by: INTERNAL MEDICINE

## 2020-02-26 PROCEDURE — 99232 SBSQ HOSP IP/OBS MODERATE 35: CPT | Performed by: INTERNAL MEDICINE

## 2020-02-26 PROCEDURE — 82550 ASSAY OF CK (CPK): CPT | Performed by: INTERNAL MEDICINE

## 2020-02-26 PROCEDURE — 84100 ASSAY OF PHOSPHORUS: CPT | Performed by: INTERNAL MEDICINE

## 2020-02-26 PROCEDURE — 84443 ASSAY THYROID STIM HORMONE: CPT | Performed by: INTERNAL MEDICINE

## 2020-02-26 PROCEDURE — 83605 ASSAY OF LACTIC ACID: CPT | Performed by: INTERNAL MEDICINE

## 2020-02-26 PROCEDURE — 84145 PROCALCITONIN (PCT): CPT | Performed by: INTERNAL MEDICINE

## 2020-02-26 PROCEDURE — 80053 COMPREHEN METABOLIC PANEL: CPT | Performed by: INTERNAL MEDICINE

## 2020-02-26 PROCEDURE — 82306 VITAMIN D 25 HYDROXY: CPT | Performed by: INTERNAL MEDICINE

## 2020-02-26 PROCEDURE — 85025 COMPLETE CBC W/AUTO DIFF WBC: CPT | Performed by: INTERNAL MEDICINE

## 2020-02-26 PROCEDURE — 80048 BASIC METABOLIC PNL TOTAL CA: CPT | Performed by: INTERNAL MEDICINE

## 2020-02-26 PROCEDURE — 82805 BLOOD GASES W/O2 SATURATION: CPT | Performed by: INTERNAL MEDICINE

## 2020-02-26 RX ORDER — POTASSIUM CHLORIDE 14.9 MG/ML
20 INJECTION INTRAVENOUS 4 TIMES DAILY
Status: COMPLETED | OUTPATIENT
Start: 2020-02-26 | End: 2020-02-27

## 2020-02-26 RX ORDER — POTASSIUM CHLORIDE 14.9 MG/ML
20 INJECTION INTRAVENOUS 4 TIMES DAILY
Status: DISCONTINUED | OUTPATIENT
Start: 2020-02-26 | End: 2020-02-26

## 2020-02-26 RX ORDER — SODIUM BICARBONATE 650 MG/1
1300 TABLET ORAL
Status: DISCONTINUED | OUTPATIENT
Start: 2020-02-26 | End: 2020-02-27 | Stop reason: HOSPADM

## 2020-02-26 RX ORDER — POTASSIUM CHLORIDE 14.9 MG/ML
20 INJECTION INTRAVENOUS
Status: COMPLETED | OUTPATIENT
Start: 2020-02-26 | End: 2020-02-26

## 2020-02-26 RX ORDER — SODIUM BICARBONATE 650 MG/1
650 TABLET ORAL ONCE
Status: COMPLETED | OUTPATIENT
Start: 2020-02-26 | End: 2020-02-26

## 2020-02-26 RX ADMIN — SODIUM BICARBONATE 650 MG TABLET 650 MG: at 14:04

## 2020-02-26 RX ADMIN — POTASSIUM CHLORIDE 40 MEQ: 20 SOLUTION ORAL at 14:04

## 2020-02-26 RX ADMIN — POTASSIUM CHLORIDE 40 MEQ: 20 SOLUTION ORAL at 21:00

## 2020-02-26 RX ADMIN — SODIUM CHLORIDE, SODIUM GLUCONATE, SODIUM ACETATE, POTASSIUM CHLORIDE, MAGNESIUM CHLORIDE, SODIUM PHOSPHATE, DIBASIC, AND POTASSIUM PHOSPHATE 100 ML/HR: .53; .5; .37; .037; .03; .012; .00082 INJECTION, SOLUTION INTRAVENOUS at 17:18

## 2020-02-26 RX ADMIN — TOPIRAMATE 100 MG: 100 TABLET, FILM COATED ORAL at 08:28

## 2020-02-26 RX ADMIN — ZINC SULFATE 220 MG (50 MG) CAPSULE 220 MG: CAPSULE at 08:28

## 2020-02-26 RX ADMIN — POTASSIUM CHLORIDE 20 MEQ: 14.9 INJECTION, SOLUTION INTRAVENOUS at 15:12

## 2020-02-26 RX ADMIN — POTASSIUM CHLORIDE 40 MEQ: 20 SOLUTION ORAL at 17:06

## 2020-02-26 RX ADMIN — Medication 100 MG: at 08:29

## 2020-02-26 RX ADMIN — AMILORIDE HYDROCLORIDE 5 MG: 5 TABLET ORAL at 08:29

## 2020-02-26 RX ADMIN — POTASSIUM CHLORIDE 40 MEQ: 20 SOLUTION ORAL at 06:11

## 2020-02-26 RX ADMIN — POTASSIUM CHLORIDE 40 MEQ: 20 SOLUTION ORAL at 11:21

## 2020-02-26 RX ADMIN — SODIUM CHLORIDE, SODIUM GLUCONATE, SODIUM ACETATE, POTASSIUM CHLORIDE, MAGNESIUM CHLORIDE, SODIUM PHOSPHATE, DIBASIC, AND POTASSIUM PHOSPHATE 100 ML/HR: .53; .5; .37; .037; .03; .012; .00082 INJECTION, SOLUTION INTRAVENOUS at 03:16

## 2020-02-26 RX ADMIN — POTASSIUM CHLORIDE 20 MEQ: 14.9 INJECTION, SOLUTION INTRAVENOUS at 11:15

## 2020-02-26 RX ADMIN — TOPIRAMATE 100 MG: 100 TABLET, FILM COATED ORAL at 21:00

## 2020-02-26 RX ADMIN — SODIUM BICARBONATE 650 MG TABLET 650 MG: at 08:28

## 2020-02-26 RX ADMIN — MECLIZINE HYDROCHLORIDE 25 MG: 25 TABLET ORAL at 21:00

## 2020-02-26 RX ADMIN — SODIUM BICARBONATE 650 MG TABLET 1300 MG: at 17:06

## 2020-02-26 RX ADMIN — POTASSIUM CHLORIDE 20 MEQ: 14.9 INJECTION, SOLUTION INTRAVENOUS at 06:27

## 2020-02-26 RX ADMIN — AMITRIPTYLINE HYDROCHLORIDE 50 MG: 50 TABLET, FILM COATED ORAL at 21:00

## 2020-02-26 RX ADMIN — POTASSIUM CHLORIDE 20 MEQ: 14.9 INJECTION, SOLUTION INTRAVENOUS at 17:07

## 2020-02-26 RX ADMIN — POTASSIUM CHLORIDE 20 MEQ: 14.9 INJECTION, SOLUTION INTRAVENOUS at 22:58

## 2020-02-26 NOTE — PROGRESS NOTES
Progress Note - Nephrology   Leesa Butcher 38 y o  female MRN: [id number]  Unit/Bed#: 409-01 Encounter: [id number]    A/P:  1  Hypokalemia: This is acute on chronic and POA  She generally receives 40-60 mEq of IV potassium twice a week in the outpatient infusion center  She had a strep throat and was on a antibiotics  She had dysphagia for solids and had a liquid diet  She said it was difficult to swallow her potassium supplements but she did try  She was admitted with profound hypo kalemia and she is receiving IV and oral supplements  This is a chronic problem for this patient  She has  contacted [employer]  There is an undiagnosed diseases network there and they have a satellite at [address]  We have her application and will send a referral   2  Non anion gap hyperchloremic metabolic acidosis: Was switched to Isolyte to address possible saline acidosis  Will continue sodium bicarbonate tablets along with KCl solution orally and IV KCl   3  History of Roshan EN TY bypass: unsure of relation - ?malabsorption  4  Recent strep throat: treated with azithromycin with resolution but caused decreased po intake  5   Weakness: resolved      Follow up reason for today's visit:  Hypokalemia  Hypokalemia    Patient Active Problem List   Diagnosis    Hypokalemia    History of gastric bypass    Migraine without aura and without status migrainosus, not intractable    Left-sided weakness    Hypophosphatemia    Anemia    Vitamin D deficiency    Weakness of both lower extremities    Weakness of both upper extremities    Elevated CPK    Vitamin B12 deficiency    Cervical lymphadenopathy    Fatigue    Paresthesia of both lower extremities    Paresthesias    B12 deficiency    Gastric bypass status for obesity    GERD (gastroesophageal reflux disease)    Migraine    WAGNER (obstructive sleep apnea)    Other intestinal malabsorption    Acute kidney injury ([address] )    Right ovarian cyst  Chest pain    Intractable nausea and vomiting    Vertigo    Stress fracture of right foot with routine healing    High anion gap metabolic acidosis         Subjective:   Denies dizziness chest pain shortness of breath abdominal pain nausea vomiting diarrhea or dysuria    Objective:     Vitals: Blood pressure 98/61, pulse 98, temperature 98 6 °F (37 °C), temperature source Oral, resp  rate 18, height 5' 7" (1 702 m), weight 71 2 kg (156 lb 15 5 oz), SpO2 100 %  ,Body mass index is 24 58 kg/m²  Weight (last 2 days)     Date/Time   Weight    02/24/20 10:45:48   71 2 (156 97)                Intake/Output Summary (Last 24 hours) at 2/26/2020 0916  Last data filed at 2/26/2020 9043  Gross per 24 hour   Intake 2111 66 ml   Output --   Net 2111 66 ml     I/O last 3 completed shifts:   In: 3491 7 [P O :1080; I V :1986 7; IV Piggyback:425]  Out: -          Physical Exam: BP 98/61   Pulse 98   Temp 98 6 °F (37 °C) (Oral)   Resp 18   Ht 5' 7" (1 702 m)   Wt 71 2 kg (156 lb 15 5 oz)   SpO2 100%   BMI 24 58 kg/m²     General Appearance:    Alert, cooperative, no distress, appears stated age   Head:    Normocephalic, without obvious abnormality, atraumatic   Eyes:    Conjunctiva/corneas clear   Ears:    Normal external ears   Nose:   Nares normal, septum midline, mucosa normal, no drainage    or sinus tenderness   Throat:   Lips, mucosa, and tongue normal; teeth and gums normal   Neck:   Supple, symmetrical, trachea midline, no adenopathy;        thyroid:  No enlargement/tenderness/nodules; no carotid    bruit or JVD   Back:     Symmetric, no curvature, ROM normal, no CVA tenderness   Lungs:     Clear to auscultation bilaterally, respirations unlabored   Chest wall:    No tenderness or deformity   Heart:    Regular rate and rhythm, S1 and S2 normal, no murmur, rub   or gallop   Abdomen:     Soft, non-tender, bowel sounds active   Extremities:   Extremities normal, atraumatic, no cyanosis or edema   Skin:   Skin color, texture, turgor normal, no rashes or lesions   Lymph nodes:   Cervical normal   Neurologic:   CNII-XII intact            Lab, Imaging and other studies: I have personally reviewed pertinent labs  CBC:   Lab Results   Component Value Date    WBC 4 86 02/26/2020    HGB 11 0 (L) 02/26/2020    HCT 34 1 (L) 02/26/2020    MCV 93 02/26/2020     02/26/2020    MCH 30 1 02/26/2020    MCHC 32 3 02/26/2020    RDW 14 0 02/26/2020    MPV 10 6 02/26/2020    NRBC 0 02/26/2020     CMP:   Lab Results   Component Value Date    K 3 1 (L) 02/26/2020     (H) 02/26/2020    CO2 16 (L) 02/26/2020    BUN 8 02/26/2020    CREATININE 0 76 02/26/2020    CALCIUM 7 5 (L) 02/26/2020    AST 18 02/26/2020    ALT 21 02/26/2020    ALKPHOS 43 (L) 02/26/2020    EGFR 101 02/26/2020         Results from last 7 days   Lab Units 02/26/20  0501 02/25/20  0557 02/24/20  1605   POTASSIUM mmol/L 3 1* 3 5 2 0*   CHLORIDE mmol/L 113* 111* 103   CO2 mmol/L 16* 16* 22   BUN mg/dL 8 8 13   CREATININE mg/dL 0 76 0 88 0 96   CALCIUM mg/dL 7 5* 7 9* 8 1*   ALK PHOS U/L 43* 40*  --    ALT U/L 21 17  --    AST U/L 18 22  --          Phosphorus:   Lab Results   Component Value Date    PHOS 2 6 (L) 02/26/2020     Magnesium:   Lab Results   Component Value Date    MG 2 0 02/26/2020     Urinalysis: No results found for: COLORU, CLARITYU, SPECGRAV, PHUR, LEUKOCYTESUR, NITRITE, PROTEINUA, GLUCOSEU, KETONESU, BILIRUBINUR, BLOODU  Ionized Calcium: No results found for: CAION  Coagulation: No results found for: PT, INR, APTT  Troponin: No results found for: TROPONINI  ABG: No results found for: PHART, UPZ4OPP, PO2ART, XHO5WFD, U1GJPEEQ, BEART, SOURCE  Radiology review:     IMAGING  No results found      Current Facility-Administered Medications   Medication Dose Route Frequency    acetaminophen (TYLENOL) tablet 650 mg  650 mg Oral Q6H PRN    AMILoride tablet 5 mg  5 mg Oral Daily    amitriptyline (ELAVIL) tablet 50 mg  50 mg Oral HS    enoxaparin (LOVENOX) subcutaneous injection 40 mg  40 mg Subcutaneous Q24H    hydrOXYzine HCL (ATARAX) tablet 50 mg  50 mg Oral TID PRN    meclizine (ANTIVERT) tablet 25 mg  25 mg Oral HS    multi-electrolyte (PLASMALYTE-A/ISOLYTE-S PH 7 4) IV solution  100 mL/hr Intravenous Continuous    potassium chloride 10 % oral solution 40 mEq  40 mEq Oral 5x Daily    potassium chloride 20 mEq IVPB (premix)  20 mEq Intravenous 4x Daily    potassium chloride 20 mEq IVPB (premix)  20 mEq Intravenous Q2H    sodium bicarbonate tablet 650 mg  650 mg Oral BID after meals    SUMAtriptan (IMITREX) tablet 100 mg  100 mg Oral PRN    thiamine (VITAMIN B1) tablet 100 mg  100 mg Oral Daily    topiramate (TOPAMAX) tablet 100 mg  100 mg Oral BID    zinc sulfate (ZINCATE) capsule 220 mg  220 mg Oral Daily     Medications Discontinued During This Encounter   Medication Reason    potassium chloride (K-DUR,KLOR-CON) CR tablet 40 mEq     sodium chloride 0 9 % with KCl 40 mEq/L infusion (premix)        Jaylene Cain MD      This progress note was produced in part using a dictation device which may document imprecise wording from author's original intent

## 2020-02-26 NOTE — ASSESSMENT & PLAN NOTE
· Secondary to hyperchloremia  · Venous blood last done yesterday showed pH of 7 2  · Patient appears asymptomatic  · Discussed with nephrology    Increase bicarb to 1300 mg t i d   · Recheck VBG tomorrow

## 2020-02-26 NOTE — UTILIZATION REVIEW
Notification of Inpatient Admission/Inpatient Authorization Request   This is a Notification of Inpatient Admission for [address]  Be advised that this patient was admitted to our facility under Inpatient Status  Contact [facility] at [phone] for additional admission information  [facility] DEPT  DEDICATED -[phone]  Patient Name:   Taylor Cornelius   YOB: 1985       [address]   [address]:   [address]  Tax ID: [id number]  NPI: [id number] Attending Provider/NPI: Cara Bravo [[id number]]   Place of Service Code: 24     Place of Service Name:  [address] [place] [address]   Start Date: 2/24/20 1044     Discharge Date & Time: No discharge date for patient encounter  Type of Admission: Inpatient Status Discharge Disposition   (if discharged): Home/Self Care   Patient Diagnoses: Hypokalemia [E87 6]     Orders: Admission Orders (From admission, onward)     Ordered        02/24/20 2056  Inpatient Admission  Once                    Assigned Utilization Review Contact: [facility]  Utilization   Network Utilization Review Department  Phone: [phone]; Fax [phone]  Email: [email or web address]   ATTENTION PAYERS: Please call the assigned Utilization  directly with any questions or concerns ALL voicemails in the department are confidential  Send all requests for admission clinical reviews, approved or denied determinations and any other requests to dedicated fax number belonging to the campus where the patient is receiving treatment

## 2020-02-26 NOTE — PROGRESS NOTES
Progress Note - Stephanie Hodge 1985, 38 y o  female MRN: [id number]    Unit/Bed#: 409-01 Encounter: [id number]    Primary Care Provider: Lizzie Le,    Date and time admitted to hospital: 2020 10:44 AM        * Hypokalemia  Assessment & Plan  · Etiology unclear  Chronic problem for patient  Gets regular outpatient infusion  · Presented with potassium of 2 1 in the setting of decreased oral intake from strep throat  · Continue to replete with PO and IV potassium chloride supplementation  · Nephrology following  Normal anion gap metabolic acidosis  Assessment & Plan  · Secondary to hyperchloremia  · Venous blood last done yesterday showed pH of 7 2  · Patient appears asymptomatic  · Discussed with nephrology  Increase bicarb to 1300 mg t i d   · Recheck VBG tomorrow      VTE Pharmacologic Prophylaxis:   Pharmacologic: Enoxaparin (Lovenox)  Mechanical VTE Prophylaxis in Place: Yes    Patient Centered Rounds: I have performed bedside rounds with nursing staff today  Discussions with Specialists or Other Care Team Provider: nephrology    Education and Discussions with Family / Patient: pt    Time Spent for Care: 30 minutes  More than 50% of total time spent on counseling and coordination of care as described above  Current Length of Stay: 2 day(s)    Current Patient Status: Inpatient   Certification Statement: The patient will continue to require additional inpatient hospital stay due to above    Discharge Plan: in next 1-2 days once K and pH satbilizes    Code Status: Level 1 - Full Code      Subjective:   Pt seen and examined by me this morning  Pt reports feeling much better now  Denies any specific complaints      Objective:     Vitals:   Temp (24hrs), Av 9 °F (37 2 °C), Min:98 6 °F (37 °C), Max:99 2 °F (37 3 °C)    Temp:  [98 6 °F (37 °C)-99 2 °F (37 3 °C)] 98 6 °F (37 °C)  HR:  [84-98] 98  Resp:  [18] 18  BP: ()/(60-63) 98/61  SpO2:  [98 %-100 %] 100 %  Body mass index is 24 58 kg/m²  Input and Output Summary (last 24 hours): Intake/Output Summary (Last 24 hours) at 2/26/2020 1435  Last data filed at 2/26/2020 0930  Gross per 24 hour   Intake 2411 66 ml   Output --   Net 2411 66 ml       Physical Exam:     Physical Exam    Constitutional: Pt appears comfortable  Not in any acute distress  Cardiovascular: Normal rate, regular rhythm, normal heart sounds  No murmur heard  Pulmonary/Chest: Effort normal, air entry b/l equal  No respiratory distress  Pt has no wheezes or crackles  Abdominal: Soft  Non-distended, Non-tender  Bowel sounds are normal    Musculoskeletal: Normal range of motion  Neurological: alert and oriented to person, place, and time  Moving all extremities spontaneously  Psychiatric: normal mood and affect  Additional Data:     Labs:    Results from last 7 days   Lab Units 02/26/20  0501   WBC Thousand/uL 4 86   HEMOGLOBIN g/dL 11 0*   HEMATOCRIT % 34 1*   PLATELETS Thousands/uL 233   NEUTROS PCT % 49   LYMPHS PCT % 39   MONOS PCT % 8   EOS PCT % 3     Results from last 7 days   Lab Units 02/26/20  0501   SODIUM mmol/L 141   POTASSIUM mmol/L 3 1*   CHLORIDE mmol/L 113*   CO2 mmol/L 16*   BUN mg/dL 8   CREATININE mg/dL 0 76   ANION GAP mmol/L 12   CALCIUM mg/dL 7 5*   ALBUMIN g/dL 3 0*   TOTAL BILIRUBIN mg/dL 0 20   ALK PHOS U/L 43*   ALT U/L 21   AST U/L 18   GLUCOSE RANDOM mg/dL 84                 Results from last 7 days   Lab Units 02/26/20  0501   LACTIC ACID mmol/L 0 6   PROCALCITONIN ng/ml <0 05           * I Have Reviewed All Lab Data Listed Above  * Additional Pertinent Lab Tests Reviewed:  Veto 66 Admission Reviewed    Imaging:    Imaging Reports Reviewed Today Include:   Imaging Personally Reviewed by Myself Includes:     Recent Cultures (last 7 days):           Last 24 Hours Medication List:     Current Facility-Administered Medications:  acetaminophen 650 mg Oral Q6H PRN [place] Rana, DO    AMILoride 5 mg Oral Daily Merribeth Kaleb, DO    amitriptyline 50 mg Oral HS Merribeth Kaleb, DO    enoxaparin 40 mg Subcutaneous Q24H [employer], DO    hydrOXYzine HCL 50 mg Oral TID PRN Merribeth Kaleb, DO    meclizine 25 mg Oral HS Merribeth Kaleb, DO    multi-electrolyte 100 mL/hr Intravenous Continuous Gustavo People, CRNP Last Rate: 100 mL/hr (02/26/20 0316)   potassium chloride 40 mEq Oral 5x Daily Merribeth Kaleb, DO    potassium chloride 20 mEq Intravenous 4x Daily Gustavo People, CRNP Last Rate: 20 mEq (02/26/20 0627)   sodium bicarbonate 1,300 mg Oral TID after meals Pat Garg MD    SUMAtriptan 100 mg Oral PRN Merribeth Kaleb, DO    thiamine 100 mg Oral Daily Merribeth Kaleb, DO    topiramate 100 mg Oral BID Merribeth Kaleb, DO    zinc sulfate 220 mg Oral Daily Merribeth Kaleb, DO         Today, Patient Was Seen By: Pat Garg MD    ** Please Note: Dictation voice to text software may have been used in the creation of this document   **

## 2020-02-26 NOTE — ASSESSMENT & PLAN NOTE
· Etiology unclear  Chronic problem for patient  Gets regular outpatient infusion  · Presented with potassium of 2 1 in the setting of decreased oral intake from strep throat  · Continue to replete with PO and IV potassium chloride supplementation  · Nephrology following

## 2020-02-27 ENCOUNTER — HOSPITAL ENCOUNTER (OUTPATIENT)
Dept: INFUSION CENTER | Facility: HOSPITAL | Age: 35
Discharge: HOME | End: 2020-02-27

## 2020-02-27 VITALS
HEART RATE: 84 BPM | DIASTOLIC BLOOD PRESSURE: 57 MMHG | TEMPERATURE: 97.7 F | WEIGHT: 156.97 LBS | SYSTOLIC BLOOD PRESSURE: 99 MMHG | HEIGHT: 67 IN | RESPIRATION RATE: 18 BRPM | OXYGEN SATURATION: 99 % | BODY MASS INDEX: 24.64 KG/M2

## 2020-02-27 LAB
ANION GAP SERPL CALCULATED.3IONS-SCNC: 11 MMOL/L (ref 4–13)
BASE EX.OXY STD BLDV CALC-SCNC: 91.9 % (ref 60–80)
BASE EXCESS BLDV CALC-SCNC: -14.6 MMOL/L
BUN SERPL-MCNC: 7 MG/DL (ref 5–25)
CALCIUM SERPL-MCNC: 7.7 MG/DL (ref 8.3–10.1)
CHLORIDE SERPL-SCNC: 115 MMOL/L (ref 100–108)
CO2 SERPL-SCNC: 14 MMOL/L (ref 21–32)
CREAT SERPL-MCNC: 0.79 MG/DL (ref 0.6–1.3)
ERYTHROCYTE [DISTWIDTH] IN BLOOD BY AUTOMATED COUNT: 14.6 % (ref 11.6–15.1)
GFR SERPL CREATININE-BSD FRML MDRD: 97 ML/MIN/1.73SQ M
GLUCOSE SERPL-MCNC: 102 MG/DL (ref 65–140)
HCO3 BLDV-SCNC: 12.6 MMOL/L (ref 24–30)
HCT VFR BLD AUTO: 35 % (ref 34.8–46.1)
HGB BLD-MCNC: 11 G/DL (ref 11.5–15.4)
MAGNESIUM SERPL-MCNC: 1.9 MG/DL (ref 1.6–2.6)
MCH RBC QN AUTO: 29.9 PG (ref 26.8–34.3)
MCHC RBC AUTO-ENTMCNC: 31.4 G/DL (ref 31.4–37.4)
MCV RBC AUTO: 95 FL (ref 82–98)
O2 CT BLDV-SCNC: 15.2 ML/DL
PCO2 BLDV: 33.7 MM HG (ref 42–50)
PH BLDV: 7.19 [PH] (ref 7.3–7.4)
PHOSPHATE SERPL-MCNC: 1.9 MG/DL (ref 2.7–4.5)
PLATELET # BLD AUTO: 245 THOUSANDS/UL (ref 149–390)
PMV BLD AUTO: 10.8 FL (ref 8.9–12.7)
PO2 BLDV: 71.1 MM HG (ref 35–45)
POTASSIUM SERPL-SCNC: 4.9 MMOL/L (ref 3.5–5.3)
RBC # BLD AUTO: 3.68 MILLION/UL (ref 3.81–5.12)
SODIUM SERPL-SCNC: 140 MMOL/L (ref 136–145)
WBC # BLD AUTO: 4.39 THOUSAND/UL (ref 4.31–10.16)

## 2020-02-27 PROCEDURE — 82805 BLOOD GASES W/O2 SATURATION: CPT | Performed by: INTERNAL MEDICINE

## 2020-02-27 PROCEDURE — 80048 BASIC METABOLIC PNL TOTAL CA: CPT | Performed by: NURSE PRACTITIONER

## 2020-02-27 PROCEDURE — 85027 COMPLETE CBC AUTOMATED: CPT | Performed by: NURSE PRACTITIONER

## 2020-02-27 PROCEDURE — 99239 HOSP IP/OBS DSCHRG MGMT >30: CPT | Performed by: INTERNAL MEDICINE

## 2020-02-27 PROCEDURE — 84100 ASSAY OF PHOSPHORUS: CPT | Performed by: NURSE PRACTITIONER

## 2020-02-27 PROCEDURE — 83735 ASSAY OF MAGNESIUM: CPT | Performed by: NURSE PRACTITIONER

## 2020-02-27 RX ORDER — SODIUM BICARBONATE 650 MG/1
1300 TABLET ORAL 2 TIMES DAILY
Qty: 20 TABLET | Refills: 0 | Status: SHIPPED | OUTPATIENT
Start: 2020-02-27 | End: 2020-03-04 | Stop reason: HOSPADM

## 2020-02-27 RX ADMIN — POTASSIUM CHLORIDE 40 MEQ: 20 SOLUTION ORAL at 06:10

## 2020-02-27 RX ADMIN — AMILORIDE HYDROCLORIDE 5 MG: 5 TABLET ORAL at 09:01

## 2020-02-27 RX ADMIN — ZINC SULFATE 220 MG (50 MG) CAPSULE 220 MG: CAPSULE at 09:00

## 2020-02-27 RX ADMIN — SODIUM PHOSPHATE, MONOBASIC, MONOHYDRATE 12 MMOL: 276; 142 INJECTION, SOLUTION INTRAVENOUS at 08:59

## 2020-02-27 RX ADMIN — POTASSIUM CHLORIDE 20 MEQ: 14.9 INJECTION, SOLUTION INTRAVENOUS at 05:29

## 2020-02-27 RX ADMIN — SODIUM BICARBONATE 650 MG TABLET 1300 MG: at 09:00

## 2020-02-27 RX ADMIN — TOPIRAMATE 100 MG: 100 TABLET, FILM COATED ORAL at 09:00

## 2020-02-27 RX ADMIN — Medication 100 MG: at 09:00

## 2020-02-27 NOTE — ASSESSMENT & PLAN NOTE
· Etiology unclear  · The patient was seen in consultation by Nephrology  · Resolved with PO and IV potassium chloride supplementation during the hospitalization  · The patient will have a BMP checked on Monday (03/02/2020) prior to her outpatient IV potassium chloride infusion  · Outpatient follow-up with Nephrology

## 2020-02-27 NOTE — SOCIAL WORK
Pt is being discharged home today  Pt is going to drive herself home  Follow up appointments reviewed with good understanding  Was unable to make an appointment for patient with Dr Orestes Ridley left message for office to call her at home with appointment  Case Management reviewed discharge planning process including the following: identifying help that is needed at home, pt's preference for discharge needs and Meds at [facility]  Reviewed with Pt that any member of the healthcare team can answer questions regarding : medications, jmportance of recognizing  Signs and symptoms of any  medical problems  Case Management also encouraged pt to follow up with all recommended appointments after discharge

## 2020-02-27 NOTE — NURSING NOTE
Patient walked off floor,  discharge instructions given, verbal understanding of same  Patient in no acute distress

## 2020-02-27 NOTE — DISCHARGE SUMMARY
Discharge- Raymon Palm 1985, 38 y o  female MRN: [id number]    Unit/Bed#: 409-01 Encounter: [id number]    Primary Care Provider: David Ruvalcaba DO   Date and time admitted to hospital: 2/24/2020 10:44 AM        * Hypokalemia  Assessment & Plan  · Etiology unclear  · The patient was seen in consultation by Nephrology  · Resolved with PO and IV potassium chloride supplementation during the hospitalization  · The patient will have a BMP checked on Monday (03/02/2020) prior to her outpatient IV potassium chloride infusion  · Outpatient follow-up with Nephrology    Normal anion gap metabolic acidosis  Assessment & Plan  · Hyperchloremic metabolic acidosis possibly secondary to IV fluids  · Asymptomatic  · The patient was seen in consultation by Nephrology  · The patient was initiated on sodium bicarbonate treatment during the hospitalization per Nephrology  · Continue sodium bicarbonate 1300 mg PO BID for 5 days upon discharge  · The patient will have a BMP checked on Monday (03/02/2020) prior to her outpatient IV potassium chloride infusion  · Outpatient follow-up with Nephrology    Vitamin D deficiency  Assessment & Plan  · Continue ergocalciferol supplementation  Outpatient follow-up with PCP in regards to this matter      Discharging Physician / Practitioner: Damián Choudhary DO  PCP: David Ruvalcaba DO  Admission Date:   Admission Orders (From admission, onward)     Ordered        02/24/20 2056  Inpatient Admission  Once                   Discharge Date: 02/27/20    Consultations During Hospital Stay:  · Nephrology    Procedures Performed:   · None    Incidental Findings:   · None     Test Results Pending at Discharge (will require follow up): · None      Complications:  None    Reason for Admission:  Symptomatic hypokalemia    Hospital Course: Raymon Palm is a 38 y o  female patient who originally presented to the hospital on 2/24/2020 due to severe hypokalemia      Please see above list of diagnoses and related plan for additional information  Condition at Discharge: good     Discharge Day Visit / Exam:     Subjective: The patient was seen and examined  The patient is doing better  No extremity paresthesias  No chest pain  No shortness of breath  No abdominal pain  No nausea or vomiting  Vitals: Blood Pressure: 99/57 (02/27/20 0757)  Pulse: 84 (02/27/20 0757)  Temperature: 97 7 °F (36 5 °C) (02/27/20 0053)  Temp Source: Oral (02/27/20 0053)  Respirations: 18 (02/27/20 0053)  Height: 5' 7" (170 2 cm) (02/24/20 1045)  Weight - Scale: 71 2 kg (156 lb 15 5 oz) (02/24/20 1045)  SpO2: 99 % (02/27/20 0834)  Exam:   Physical Exam  General:  NAD, follows commands  HEENT:  NC/AT, mucous membranes moist  Neck:  Supple, No JVP elevation  CV:  + S1, + S2, RRR  Pulm:  Lung fields are CTA bilaterally  Abd:  Soft, Non-tender, Non-distended  Ext:  No clubbing/cyanosis/edema  Skin:  No rashes  Neuro:  Awake, alert, oriented  Psych:  Normal mood and affect    Discharge instructions/Information to patient and family:   See after visit summary for information provided to patient and family  Provisions for Follow-Up Care:  See after visit summary for information related to follow-up care and any pertinent home health orders  Disposition:     Home    Planned Readmission:  No     Discharge Statement:  I spent 35 minutes discharging the patient  This time was spent on the day of discharge  I had direct contact with the patient on the day of discharge  Greater than 50% of the total time was spent examining patient, answering all patient questions, arranging and discussing plan of care with patient as well as directly providing post-discharge instructions  Additional time then spent on discharge activities  Discharge Medications:  See after visit summary for reconciled discharge medications provided to patient and family        ** Please Note: This note has been constructed using a voice recognition system **

## 2020-02-27 NOTE — ASSESSMENT & PLAN NOTE
· Hyperchloremic metabolic acidosis possibly secondary to IV fluids  · Asymptomatic  · The patient was seen in consultation by Nephrology  · The patient was initiated on sodium bicarbonate treatment during the hospitalization per Nephrology  · Continue sodium bicarbonate 1300 mg PO BID for 5 days upon discharge  · The patient will have a BMP checked on Monday (03/02/2020) prior to her outpatient IV potassium chloride infusion  · Outpatient follow-up with Nephrology

## 2020-02-28 ENCOUNTER — TELEPHONE (OUTPATIENT)
Dept: NEPHROLOGY | Facility: CLINIC | Age: 35
End: 2020-02-28

## 2020-02-28 RX ORDER — POTASSIUM CHLORIDE IN 0.9%NACL 10MEQ/0.1L
40 INTRAVENOUS SOLUTION, PIGGYBACK (ML) INTRAVENOUS CONTINUOUS
Status: CANCELLED
Start: 2020-03-03

## 2020-02-28 NOTE — UTILIZATION REVIEW
Notification of Discharge  This is a Notification of Discharge from our facility [address]  Please be advised that this patient has been discharge from our facility  Below you will find the admission and discharge date and time including the patients disposition  PRESENTATION DATE: 2/24/2020 10:44 AM  OBS ADMISSION DATE:   IP ADMISSION DATE: 2/24/20 1044   DISCHARGE DATE: 2/27/2020 11:52 AM  DISPOSITION: Home/Self Care Home/Self Care   Admission Orders listed below:  Admission Orders (From admission, onward)     Ordered        02/24/20 2056  Inpatient Admission  Once                   Please contact the UR Department if additional information is required to close this patient's authorization/case  [facility] Utilization Review Department  Main: [phone] x carefully listen to the prompts  All voicemails are confidential   [email or web address]  Send all requests for admission clinical reviews, approved or denied determinations and any other requests to dedicated fax number below belonging to the campus where the patient is receiving treatment   List of dedicated fax numbers:  [address] DENIALS (Administrative/Medical Necessity) [phone]   [address] (Maternity/NICU/Pediatrics) [phone]   Aundra Small [phone]   Alvena [place] [phone]   Severiano Loud [phone]   [facility] [address] [place] [address] [phone]   [facility]  [phone]   [address] [facility] [address]  [address] [facility] [address] [facility] [phone]

## 2020-03-01 ENCOUNTER — OFFICE VISIT (OUTPATIENT)
Dept: URGENT CARE | Facility: CLINIC | Age: 35
End: 2020-03-01
Payer: COMMERCIAL

## 2020-03-01 VITALS
BODY MASS INDEX: 24.64 KG/M2 | SYSTOLIC BLOOD PRESSURE: 139 MMHG | WEIGHT: 156.97 LBS | HEART RATE: 80 BPM | TEMPERATURE: 99.9 F | HEIGHT: 67 IN | RESPIRATION RATE: 16 BRPM | OXYGEN SATURATION: 99 % | DIASTOLIC BLOOD PRESSURE: 86 MMHG

## 2020-03-01 DIAGNOSIS — H69.93 DISORDER OF BOTH EUSTACHIAN TUBES: ICD-10-CM

## 2020-03-01 DIAGNOSIS — B34.9 VIRAL SYNDROME: ICD-10-CM

## 2020-03-01 DIAGNOSIS — J02.9 SORE THROAT: Primary | ICD-10-CM

## 2020-03-01 LAB — S PYO AG THROAT QL: NEGATIVE

## 2020-03-01 PROCEDURE — G0382 LEV 3 HOSP TYPE B ED VISIT: HCPCS | Performed by: EMERGENCY MEDICINE

## 2020-03-01 PROCEDURE — 87880 STREP A ASSAY W/OPTIC: CPT | Performed by: EMERGENCY MEDICINE

## 2020-03-01 PROCEDURE — 87070 CULTURE OTHR SPECIMN AEROBIC: CPT | Performed by: EMERGENCY MEDICINE

## 2020-03-01 RX ORDER — METHYLPREDNISOLONE 4 MG/1
TABLET ORAL
Qty: 21 TABLET | Refills: 0 | Status: SHIPPED | OUTPATIENT
Start: 2020-03-01 | End: 2020-03-12 | Stop reason: HOSPADM

## 2020-03-01 RX ORDER — ONDANSETRON 4 MG/1
4 TABLET, FILM COATED ORAL EVERY 8 HOURS PRN
Qty: 8 TABLET | Refills: 0 | Status: SHIPPED | OUTPATIENT
Start: 2020-03-01 | End: 2020-03-12 | Stop reason: HOSPADM

## 2020-03-01 NOTE — PROGRESS NOTES
[facility]        NAME: Regina Reese is a 38 y o  female  : 1985    MRN: [id number]  DATE: 2020  TIME: 11:08 AM    Assessment and Plan   Sore throat [J02 9]  1  Sore throat           Patient Instructions     Patient Instructions   You have been diagnosed with a Viral Syndrome infection and your symptoms should resolve over the next 7 to 10 days with the treatments recommended today  If they do not, it is possible that you have developed a bacterial infection and you should return  If you were to take an antibiotic while you are still in this viral stage, you will not get better any faster, but could kill off the good germs in your body as well as make the germs in you resistant to the antibiotic  You may take Imodium as discussed for diarrhea  Take an expectorant - guaifenesin should be the only ingredient - during the day, and the cough suppressant (ex  Robitussin DM or Tessalon) if needed at night only  Take Zinc 12 5 to 15 mg every 2 - 3 hrs while awake for the next few days  You may take Cold Carrillo (13 3 mg of Zinc) or split a 25 mg Zinc tablet or lozenge in two or a 50 mg into four to get the proper dose  The total daily dose of Zinc should exceed 75 mg per day  Hold any NSAIDs like Ibuprofen (Advil), Naprosyn (Aleve), etc while on steroids like Medrol or Prednisone    If you are diabetic, you should also adhere strictly to your diet and monitor your blood sugar closely while on the steroids as discussed  Discontinue the steroid immediately if your blood sugar gets out of control  Viral Syndrome   WHAT YOU NEED TO KNOW:   What is viral syndrome? Viral syndrome is a term used for a viral infection that has no clear cause  Viruses are spread easily from person to person through the air and on shared items  What are the signs and symptoms of viral syndrome? Signs and symptoms may start slowly or suddenly and last hours to days   They can be mild to severe and can change over days or hours  You may have any of the following:  · Fever and chills    · A runny or stuffy nose     · Cough, sore throat, or hoarseness     · Headache, or pain and pressure around your eyes     · Muscle aches and joint pain     · Shortness of breath or wheezing     · Abdominal pain, cramps, and diarrhea     · Nausea, vomiting, or loss of appetite  How is viral syndrome diagnosed and treated? Your healthcare provider will ask about your symptoms and examine you  Tell him about any recent travel or insect bites  An illness caused by a virus usually goes away in 10 to 14 days without treatment  You may need medicine to help manage your symptoms such as fever, muscle aches, cough, or congestion  How can I manage my symptoms? · Drink liquids as directed  to prevent dehydration  Ask how much liquid to drink each day and which liquids are best for you  Ask if you should drink an oral rehydration solution (ORS)  An ORS has the right amounts of water, salts, and sugar you need to replace body fluids  This may help prevent dehydration caused by vomiting or diarrhea  Do not drink liquids with caffeine  Liquids with caffeine can make dehydration worse  · Get plenty of rest  to help your body heal  Take naps throughout the day  Ask your healthcare provider when you can return to work and your normal activities  · Use a cool mist humidifier  to help you breathe easier if you have nasal or chest congestion  Ask your healthcare provider how to use a cool mist humidifier  · Eat honey or use cough drops  to help decrease throat discomfort  Ask your healthcare provider how much honey you should eat each day  Cough drops are available without a doctor's order  Follow directions for taking cough drops  · Do not smoke and stay away from others who smoke  Nicotine and other chemicals in cigarettes and cigars can cause lung damage  Smoking can also delay healing   Ask your healthcare provider for information if you currently smoke and need help to quit  E-cigarettes or smokeless tobacco still contain nicotine  Talk to your healthcare provider before you use these products  · Wash your hands frequently  to prevent the spread of germs to others  Use soap and water  Use gel hand  when soap and water are not available  Wash your hands after you use the bathroom, cough, or sneeze  Wash your hands before you prepare or eat food  Call 911 or have someone else call 911 if:   · You have a seizure  · You cannot be woken  · You have chest pain or trouble breathing  When should I seek immediate care? · You have a stiff neck, a bad headache, and sensitivity to light  · You feel weak, dizzy, or confused  · You stop urinating or urinate a lot less than normal      · You cough up blood or thick, yellow or green, mucus  · You have severe abdominal pain or your abdomen is larger than usual   When should I contact my healthcare provider? · Your symptoms do not get better with treatment or get worse after 3 days  · You have a rash or ear pain  · You have burning when you urinate  · You have questions or concerns about your condition or care  CARE AGREEMENT:   You have the right to help plan your care  Learn about your health condition and how it may be treated  Discuss treatment options with your caregivers to decide what care you want to receive  You always have the right to refuse treatment  The above information is an  only  It is not intended as medical advice for individual conditions or treatments  Talk to your doctor, nurse or pharmacist before following any medical regimen to see if it is safe and effective for you  © 2017 [address] [employer]  Information is for End User's use only and may not be sold, redistributed or otherwise used for commercial purposes   All illustrations and images included in CareNotes® are the copyrighted property of [employer]  or [employer]  Follow up with PCP in 3-5 days  Proceed to  ER if symptoms worsen  Chief Complaint     Chief Complaint   Patient presents with    Sore Throat     c/o sore throat and bilateral ear ache x 3 days  C/o some nausea and vomited x 1         History of Present Illness       Patient complains of sore throat, cough, congestion for the past 3 days also complains of nausea with vomiting x1 yesterday  She also complains of bilateral earache for the past day  Review of Systems   Review of Systems   Constitutional: Negative for chills and fever  HENT: Negative for sinus pressure, sore throat, trouble swallowing and voice change  Respiratory: Negative for cough, chest tightness, shortness of breath and wheezing  Cardiovascular: Negative for chest pain  Gastrointestinal: Positive for diarrhea, nausea and vomiting  Negative for abdominal distention, abdominal pain and blood in stool  Skin: Negative for rash  Neurological: Negative for headaches           Current Medications       Current Outpatient Medications:     AMILoride 5 mg tablet, Take 1 tablet (5 mg total) by mouth daily, Disp: 30 tablet, Rfl: 0    amitriptyline (ELAVIL) 50 mg tablet, Take 50 mg by mouth daily at bedtime, Disp: , Rfl:     b complex-C-folic acid (NEPHRO-EDIE) 0 8 mg tablet, Take 0 8 mg by mouth daily with dinner, Disp: , Rfl:     ergocalciferol (ERGOCALCIFEROL) 33463 units capsule, Take 50,000 Units by mouth once a week Patient takes this med on Mondays , Disp: , Rfl:     ferrous sulfate 325 (65 Fe) mg tablet, Take 325 mg by mouth every other day Took on 2/24/20, Disp: , Rfl:     meclizine (ANTIVERT) 25 mg tablet, Take 25 mg by mouth daily at bedtime, Disp: , Rfl:     potassium chloride 10 % oral solution, Take 30 mL (40 mEq total) by mouth 5 (five) times a day, Disp: 4500 mL, Rfl: 0    sodium bicarbonate 650 mg tablet, Take 2 tablets (1,300 mg total) by mouth 2 (two) times a day for 5 days, Disp: 20 tablet, Rfl: 0    SUMAtriptan (IMITREX) 100 mg tablet, Take 100 mg by mouth once as needed for migraine , Disp: , Rfl:     thiamine 100 MG tablet, Take 100 mg by mouth daily, Disp: , Rfl:     topiramate (TOPAMAX) 100 mg tablet, Take 100 mg by mouth 2 (two) times a day, Disp: , Rfl:     zinc sulfate (ZINCATE) 220 mg capsule, Take 1 capsule (220 mg total) by mouth daily, Disp: 30 capsule, Rfl: 0    Current Allergies     Allergies as of 2020 - Reviewed 2020   Allergen Reaction Noted    Nsaids Other (See Comments) 2017    Prednisone  2019            The following portions of the patient's history were reviewed and updated as appropriate: allergies, current medications, past family history, past medical history, past social history, past surgical history and problem list      Past Medical History:   Diagnosis Date    H  pylori infection     Hypokalemia     Migraine     Renal disorder     Rhabdomyolysis        Past Surgical History:   Procedure Laterality Date    ABDOMINAL SURGERY      APPENDECTOMY       SECTION      CHOLECYSTECTOMY      COSMETIC SURGERY      FL GUIDED CENTRAL VENOUS ACCESS DEVICE INSERTION  2018    GASTRIC BYPASS      HYSTERECTOMY      TUNNELED VENOUS PORT PLACEMENT N/A 2018    Procedure: INSERTION VENOUS PORT (PORT-A-CATH); Surgeon: Iftikhar Kidd DO;  Location: [facility];  Service: General       Family History   Problem Relation Age of Onset    No Known Problems Mother     Hypertension Father     Diabetes Father     Diabetes Maternal Grandfather          Medications have been verified  Objective   /86   Pulse 80   Temp 99 9 °F (37 7 °C) (Tympanic)   Resp 16   Ht 5' 7" (1 702 m)   Wt 71 2 kg (156 lb 15 5 oz)   SpO2 99%   BMI 24 58 kg/m²        Physical Exam     Physical Exam   Constitutional: She is oriented to person, place, and time  She appears well-developed and well-nourished  No distress     HENT:   Head: Normocephalic and atraumatic  Right Ear: Tympanic membrane and external ear normal    Left Ear: Tympanic membrane and external ear normal    Nose: Mucosal edema present  Mouth/Throat: Posterior oropharyngeal erythema present  No oropharyngeal exudate or tonsillar abscesses  Neck: Neck supple  Cardiovascular: Normal rate and regular rhythm  Pulmonary/Chest: Effort normal    Abdominal: Soft  Bowel sounds are normal  She exhibits no distension and no mass  There is no tenderness  There is no rebound and no guarding  Neurological: She is alert and oriented to person, place, and time  Skin: Skin is warm and dry  No rash noted  No pallor  Nursing note and vitals reviewed

## 2020-03-01 NOTE — PATIENT INSTRUCTIONS
You have been diagnosed with a Viral Syndrome infection and your symptoms should resolve over the next 7 to 10 days with the treatments recommended today  If they do not, it is possible that you have developed a bacterial infection and you should return  If you were to take an antibiotic while you are still in this viral stage, you will not get better any faster, but could kill off the good germs in your body as well as make the germs in you resistant to the antibiotic  You may take Imodium as discussed for diarrhea  Take an expectorant - guaifenesin should be the only ingredient - during the day, and the cough suppressant (ex  Robitussin DM or Tessalon) if needed at night only  Take Zinc 12 5 to 15 mg every 2 - 3 hrs while awake for the next few days  You may take Cold Carrillo (13 3 mg of Zinc) or split a 25 mg Zinc tablet or lozenge in two or a 50 mg into four to get the proper dose  The total daily dose of Zinc should exceed 75 mg per day  Hold any NSAIDs like Ibuprofen (Advil), Naprosyn (Aleve), etc while on steroids like Medrol or Prednisone    If you are diabetic, you should also adhere strictly to your diet and monitor your blood sugar closely while on the steroids as discussed  Discontinue the steroid immediately if your blood sugar gets out of control  Viral Syndrome   WHAT YOU NEED TO KNOW:   What is viral syndrome? Viral syndrome is a term used for a viral infection that has no clear cause  Viruses are spread easily from person to person through the air and on shared items  What are the signs and symptoms of viral syndrome? Signs and symptoms may start slowly or suddenly and last hours to days  They can be mild to severe and can change over days or hours   You may have any of the following:  · Fever and chills    · A runny or stuffy nose     · Cough, sore throat, or hoarseness     · Headache, or pain and pressure around your eyes     · Muscle aches and joint pain     · Shortness of breath or wheezing     · Abdominal pain, cramps, and diarrhea     · Nausea, vomiting, or loss of appetite  How is viral syndrome diagnosed and treated? Your healthcare provider will ask about your symptoms and examine you  Tell him about any recent travel or insect bites  An illness caused by a virus usually goes away in 10 to 14 days without treatment  You may need medicine to help manage your symptoms such as fever, muscle aches, cough, or congestion  How can I manage my symptoms? · Drink liquids as directed  to prevent dehydration  Ask how much liquid to drink each day and which liquids are best for you  Ask if you should drink an oral rehydration solution (ORS)  An ORS has the right amounts of water, salts, and sugar you need to replace body fluids  This may help prevent dehydration caused by vomiting or diarrhea  Do not drink liquids with caffeine  Liquids with caffeine can make dehydration worse  · Get plenty of rest  to help your body heal  Take naps throughout the day  Ask your healthcare provider when you can return to work and your normal activities  · Use a cool mist humidifier  to help you breathe easier if you have nasal or chest congestion  Ask your healthcare provider how to use a cool mist humidifier  · Eat honey or use cough drops  to help decrease throat discomfort  Ask your healthcare provider how much honey you should eat each day  Cough drops are available without a doctor's order  Follow directions for taking cough drops  · Do not smoke and stay away from others who smoke  Nicotine and other chemicals in cigarettes and cigars can cause lung damage  Smoking can also delay healing  Ask your healthcare provider for information if you currently smoke and need help to quit  E-cigarettes or smokeless tobacco still contain nicotine  Talk to your healthcare provider before you use these products  · Wash your hands frequently  to prevent the spread of germs to others  Use soap and water  Use gel hand  when soap and water are not available  Wash your hands after you use the bathroom, cough, or sneeze  Wash your hands before you prepare or eat food  Call 911 or have someone else call 911 if:   · You have a seizure  · You cannot be woken  · You have chest pain or trouble breathing  When should I seek immediate care? · You have a stiff neck, a bad headache, and sensitivity to light  · You feel weak, dizzy, or confused  · You stop urinating or urinate a lot less than normal      · You cough up blood or thick, yellow or green, mucus  · You have severe abdominal pain or your abdomen is larger than usual   When should I contact my healthcare provider? · Your symptoms do not get better with treatment or get worse after 3 days  · You have a rash or ear pain  · You have burning when you urinate  · You have questions or concerns about your condition or care  CARE AGREEMENT:   You have the right to help plan your care  Learn about your health condition and how it may be treated  Discuss treatment options with your caregivers to decide what care you want to receive  You always have the right to refuse treatment  The above information is an  only  It is not intended as medical advice for individual conditions or treatments  Talk to your doctor, nurse or pharmacist before following any medical regimen to see if it is safe and effective for you  © 2017 [address] Information is for End User's use only and may not be sold, redistributed or otherwise used for commercial purposes  All illustrations and images included in CareNotes® are the copyrighted property of [employer]  or Iftikhar Rogers  Eustachian Tube Dysfunction   WHAT YOU NEED TO KNOW:   What is eustachian tube dysfunction? Eustachian tube dysfunction (ETD) is a condition that prevents your eustachian tubes from opening properly   It can also cause them to become blocked  Eustachian tubes connect your middle ear to the back of your nose and throat  These tubes open and allow air to flow in and out when you sneeze, swallow, or yawn  What causes or increases my risk of ETD? ETD may be caused by swelling or buildup of mucus in your eustachian tubes  Allergies, a cold, or sinus infection can increase your risk for ETD  Smoking also increases your risk for ETD  What are the signs and symptoms of ETD? · Fullness or pressure in your ears    · Muffled hearing    · Pain in one or both ears    · Ringing in your ears    · Popping or clicking feeling in your ears    · Trouble keeping your balance  How is ETD diagnosed? Your healthcare provider will ask about your symptoms  He will examine your ears, your nose, and the back of your throat  He may also do a hearing test    How is ETD treated? Your ETD may get better on its own without any treatment  You may need any of the following:  · Exercises  such as swallowing, yawning, or chewing gum may help to open your eustachian tubes  Your healthcare provider may also recommend that you take a deep breath and then blow with your mouth shut and your nostrils pinched closed  · Air pressure devices  push air into your nose and eustachian tubes to help relieve air pressure in your ear  · Treatment for allergies  such as decongestants, antihistamines, and nasal steroids may improve ETD  They may help decrease swelling of the eustachian tubes  · Ear tubes  may help to keep your middle ear open  During this procedure, your healthcare provider will cut a small hole in your eardrum  · A myringotomy  is procedure to make a small cut in your eardrum and suction out fluid from your middle ear  · Tuboplasty  is a procedure to widen your eustachian tubes  When should I contact my healthcare provider? · Your symptoms do not improve or get worse  · You have a fever  · You have any hearing loss       · You have questions or concerns about your condition or care  CARE AGREEMENT:   You have the right to help plan your care  Learn about your health condition and how it may be treated  Discuss treatment options with your caregivers to decide what care you want to receive  You always have the right to refuse treatment  The above information is an  only  It is not intended as medical advice for individual conditions or treatments  Talk to your doctor, nurse or pharmacist before following any medical regimen to see if it is safe and effective for you  © 2017 [address]  Information is for End User's use only and may not be sold, redistributed or otherwise used for commercial purposes  All illustrations and images included in CareNotes® are the copyrighted property of [employer]  or Iftikhar Rogers  Eustachian Tube Dysfunction   WHAT YOU NEED TO KNOW:   What is eustachian tube dysfunction? Eustachian tube dysfunction (ETD) is a condition that prevents your eustachian tubes from opening properly  It can also cause them to become blocked  Eustachian tubes connect your middle ear to the back of your nose and throat  These tubes open and allow air to flow in and out when you sneeze, swallow, or yawn  What causes or increases my risk of ETD? ETD may be caused by swelling or buildup of mucus in your eustachian tubes  Allergies, a cold, or sinus infection can increase your risk for ETD  Smoking also increases your risk for ETD  What are the signs and symptoms of ETD? · Fullness or pressure in your ears    · Muffled hearing    · Pain in one or both ears    · Ringing in your ears    · Popping or clicking feeling in your ears    · Trouble keeping your balance  How is ETD diagnosed? Your healthcare provider will ask about your symptoms  He will examine your ears, your nose, and the back of your throat  He may also do a hearing test    How is ETD treated?   Your ETD may get better on its own without any treatment  You may need any of the following:  · Exercises  such as swallowing, yawning, or chewing gum may help to open your eustachian tubes  Your healthcare provider may also recommend that you take a deep breath and then blow with your mouth shut and your nostrils pinched closed  · Air pressure devices  push air into your nose and eustachian tubes to help relieve air pressure in your ear  · Treatment for allergies  such as decongestants, antihistamines, and nasal steroids may improve ETD  They may help decrease swelling of the eustachian tubes  · Ear tubes  may help to keep your middle ear open  During this procedure, your healthcare provider will cut a small hole in your eardrum  · A myringotomy  is procedure to make a small cut in your eardrum and suction out fluid from your middle ear  · Tuboplasty  is a procedure to widen your eustachian tubes  When should I contact my healthcare provider? · Your symptoms do not improve or get worse  · You have a fever  · You have any hearing loss  · You have questions or concerns about your condition or care  CARE AGREEMENT:   You have the right to help plan your care  Learn about your health condition and how it may be treated  Discuss treatment options with your caregivers to decide what care you want to receive  You always have the right to refuse treatment  The above information is an  only  It is not intended as medical advice for individual conditions or treatments  Talk to your doctor, nurse or pharmacist before following any medical regimen to see if it is safe and effective for you  © 2017 [address] [facility] Information is for End User's use only and may not be sold, redistributed or otherwise used for commercial purposes  All illustrations and images included in CareNotes® are the copyrighted property of [employer]  or Iftikhar Rogers

## 2020-03-03 ENCOUNTER — HOSPITAL ENCOUNTER (OUTPATIENT)
Dept: INFUSION CENTER | Facility: HOSPITAL | Age: 35
Discharge: HOME | End: 2020-03-03
Payer: COMMERCIAL

## 2020-03-03 ENCOUNTER — HOSPITAL ENCOUNTER (OUTPATIENT)
Facility: HOSPITAL | Age: 35
Setting detail: OBSERVATION
Discharge: HOME | End: 2020-03-04
Attending: EMERGENCY MEDICINE | Admitting: FAMILY MEDICINE
Payer: COMMERCIAL

## 2020-03-03 VITALS
DIASTOLIC BLOOD PRESSURE: 71 MMHG | TEMPERATURE: 97.3 F | HEART RATE: 89 BPM | RESPIRATION RATE: 18 BRPM | SYSTOLIC BLOOD PRESSURE: 139 MMHG

## 2020-03-03 DIAGNOSIS — E87.6 HYPOKALEMIA: ICD-10-CM

## 2020-03-03 DIAGNOSIS — E87.6 HYPOKALEMIA: Primary | ICD-10-CM

## 2020-03-03 DIAGNOSIS — K90.89 OTHER INTESTINAL MALABSORPTION: Primary | ICD-10-CM

## 2020-03-03 DIAGNOSIS — H65.90 MIDDLE EAR EFFUSION: ICD-10-CM

## 2020-03-03 LAB
ALBUMIN SERPL BCP-MCNC: 3.7 G/DL (ref 3.5–5)
ALP SERPL-CCNC: 46 U/L (ref 46–116)
ALT SERPL W P-5'-P-CCNC: 24 U/L (ref 12–78)
ANION GAP SERPL CALCULATED.3IONS-SCNC: 15 MMOL/L (ref 4–13)
ANION GAP SERPL CALCULATED.3IONS-SCNC: 16 MMOL/L (ref 4–13)
AST SERPL W P-5'-P-CCNC: 16 U/L (ref 5–45)
BACTERIA THROAT CULT: NORMAL
BASOPHILS # BLD AUTO: 0.09 THOUSANDS/ΜL (ref 0–0.1)
BASOPHILS NFR BLD AUTO: 1 % (ref 0–1)
BILIRUB SERPL-MCNC: 0.3 MG/DL (ref 0.2–1)
BUN SERPL-MCNC: 11 MG/DL (ref 5–25)
BUN SERPL-MCNC: 12 MG/DL (ref 5–25)
CALCIUM SERPL-MCNC: 7.9 MG/DL (ref 8.3–10.1)
CALCIUM SERPL-MCNC: 8.4 MG/DL (ref 8.3–10.1)
CHLORIDE SERPL-SCNC: 103 MMOL/L (ref 100–108)
CHLORIDE SERPL-SCNC: 107 MMOL/L (ref 100–108)
CO2 SERPL-SCNC: 18 MMOL/L (ref 21–32)
CO2 SERPL-SCNC: 19 MMOL/L (ref 21–32)
CREAT SERPL-MCNC: 0.92 MG/DL (ref 0.6–1.3)
CREAT SERPL-MCNC: 0.95 MG/DL (ref 0.6–1.3)
EOSINOPHIL # BLD AUTO: 0.1 THOUSAND/ΜL (ref 0–0.61)
EOSINOPHIL NFR BLD AUTO: 1 % (ref 0–6)
ERYTHROCYTE [DISTWIDTH] IN BLOOD BY AUTOMATED COUNT: 14.3 % (ref 11.6–15.1)
GFR SERPL CREATININE-BSD FRML MDRD: 77 ML/MIN/1.73SQ M
GFR SERPL CREATININE-BSD FRML MDRD: 80 ML/MIN/1.73SQ M
GLUCOSE SERPL-MCNC: 79 MG/DL (ref 65–140)
GLUCOSE SERPL-MCNC: 82 MG/DL (ref 65–140)
HCT VFR BLD AUTO: 37 % (ref 34.8–46.1)
HGB BLD-MCNC: 12.3 G/DL (ref 11.5–15.4)
IMM GRANULOCYTES # BLD AUTO: 0.02 THOUSAND/UL (ref 0–0.2)
IMM GRANULOCYTES NFR BLD AUTO: 0 % (ref 0–2)
LYMPHOCYTES # BLD AUTO: 2.6 THOUSANDS/ΜL (ref 0.6–4.47)
LYMPHOCYTES NFR BLD AUTO: 35 % (ref 14–44)
MAGNESIUM SERPL-MCNC: 2 MG/DL (ref 1.6–2.6)
MCH RBC QN AUTO: 30 PG (ref 26.8–34.3)
MCHC RBC AUTO-ENTMCNC: 33.2 G/DL (ref 31.4–37.4)
MCV RBC AUTO: 90 FL (ref 82–98)
MONOCYTES # BLD AUTO: 0.55 THOUSAND/ΜL (ref 0.17–1.22)
MONOCYTES NFR BLD AUTO: 7 % (ref 4–12)
NEUTROPHILS # BLD AUTO: 4.15 THOUSANDS/ΜL (ref 1.85–7.62)
NEUTS SEG NFR BLD AUTO: 56 % (ref 43–75)
NRBC BLD AUTO-RTO: 0 /100 WBCS
PLATELET # BLD AUTO: 317 THOUSANDS/UL (ref 149–390)
PMV BLD AUTO: 10.4 FL (ref 8.9–12.7)
POTASSIUM SERPL-SCNC: 2.1 MMOL/L (ref 3.5–5.3)
POTASSIUM SERPL-SCNC: 2.2 MMOL/L (ref 3.5–5.3)
PROT SERPL-MCNC: 7 G/DL (ref 6.4–8.2)
RBC # BLD AUTO: 4.1 MILLION/UL (ref 3.81–5.12)
SODIUM SERPL-SCNC: 137 MMOL/L (ref 136–145)
SODIUM SERPL-SCNC: 141 MMOL/L (ref 136–145)
WBC # BLD AUTO: 7.51 THOUSAND/UL (ref 4.31–10.16)

## 2020-03-03 PROCEDURE — 85025 COMPLETE CBC W/AUTO DIFF WBC: CPT | Performed by: PHYSICIAN ASSISTANT

## 2020-03-03 PROCEDURE — 93005 ELECTROCARDIOGRAM TRACING: CPT

## 2020-03-03 PROCEDURE — 36415 COLL VENOUS BLD VENIPUNCTURE: CPT | Performed by: PHYSICIAN ASSISTANT

## 2020-03-03 PROCEDURE — 80048 BASIC METABOLIC PNL TOTAL CA: CPT | Performed by: INTERNAL MEDICINE

## 2020-03-03 PROCEDURE — 83735 ASSAY OF MAGNESIUM: CPT | Performed by: PHYSICIAN ASSISTANT

## 2020-03-03 PROCEDURE — 99220 PR INITIAL OBSERVATION CARE/DAY 70 MINUTES: CPT | Performed by: FAMILY MEDICINE

## 2020-03-03 PROCEDURE — 99285 EMERGENCY DEPT VISIT HI MDM: CPT | Performed by: PHYSICIAN ASSISTANT

## 2020-03-03 PROCEDURE — 80053 COMPREHEN METABOLIC PANEL: CPT | Performed by: PHYSICIAN ASSISTANT

## 2020-03-03 PROCEDURE — 99285 EMERGENCY DEPT VISIT HI MDM: CPT

## 2020-03-03 RX ORDER — POTASSIUM CHLORIDE 20MEQ/15ML
40 LIQUID (ML) ORAL
Status: DISCONTINUED | OUTPATIENT
Start: 2020-03-03 | End: 2020-03-04 | Stop reason: HOSPADM

## 2020-03-03 RX ORDER — SODIUM CHLORIDE 9 MG/ML
20 INJECTION, SOLUTION INTRAVENOUS ONCE
Status: DISCONTINUED | OUTPATIENT
Start: 2020-03-03 | End: 2020-03-06 | Stop reason: HOSPADM

## 2020-03-03 RX ORDER — ACETAMINOPHEN 325 MG/1
650 TABLET ORAL EVERY 6 HOURS PRN
Status: DISCONTINUED | OUTPATIENT
Start: 2020-03-03 | End: 2020-03-04 | Stop reason: HOSPADM

## 2020-03-03 RX ORDER — METHYLPREDNISOLONE 4 MG/1
8 TABLET ORAL
Status: DISCONTINUED | OUTPATIENT
Start: 2020-03-05 | End: 2020-03-04 | Stop reason: HOSPADM

## 2020-03-03 RX ORDER — METHYLPREDNISOLONE 4 MG/1
4 TABLET ORAL
Status: DISCONTINUED | OUTPATIENT
Start: 2020-03-04 | End: 2020-03-03

## 2020-03-03 RX ORDER — POTASSIUM CHLORIDE 29.8 MG/ML
40 INJECTION INTRAVENOUS ONCE
Status: CANCELLED | OUTPATIENT
Start: 2020-03-03

## 2020-03-03 RX ORDER — SODIUM BICARBONATE 650 MG/1
1300 TABLET ORAL 2 TIMES DAILY
Status: DISCONTINUED | OUTPATIENT
Start: 2020-03-03 | End: 2020-03-04 | Stop reason: HOSPADM

## 2020-03-03 RX ORDER — POTASSIUM CHLORIDE 14.9 MG/ML
20 INJECTION INTRAVENOUS
Status: DISCONTINUED | OUTPATIENT
Start: 2020-03-03 | End: 2020-03-03 | Stop reason: HOSPADM

## 2020-03-03 RX ORDER — SODIUM CHLORIDE 9 MG/ML
20 INJECTION, SOLUTION INTRAVENOUS ONCE
Status: CANCELLED
Start: 2020-03-05

## 2020-03-03 RX ORDER — POTASSIUM CHLORIDE 29.8 MG/ML
40 INJECTION INTRAVENOUS ONCE
Status: CANCELLED | OUTPATIENT
Start: 2020-03-04

## 2020-03-03 RX ORDER — SUMATRIPTAN 25 MG/1
100 TABLET, FILM COATED ORAL ONCE AS NEEDED
Status: DISCONTINUED | OUTPATIENT
Start: 2020-03-03 | End: 2020-03-04 | Stop reason: HOSPADM

## 2020-03-03 RX ORDER — MECLIZINE HYDROCHLORIDE 25 MG/1
25 TABLET ORAL
Status: DISCONTINUED | OUTPATIENT
Start: 2020-03-03 | End: 2020-03-04 | Stop reason: HOSPADM

## 2020-03-03 RX ORDER — FERROUS SULFATE 325(65) MG
325 TABLET ORAL EVERY OTHER DAY
Status: DISCONTINUED | OUTPATIENT
Start: 2020-03-05 | End: 2020-03-04 | Stop reason: HOSPADM

## 2020-03-03 RX ORDER — METHYLPREDNISOLONE 4 MG/1
12 TABLET ORAL
Status: COMPLETED | OUTPATIENT
Start: 2020-03-04 | End: 2020-03-04

## 2020-03-03 RX ORDER — AMITRIPTYLINE HYDROCHLORIDE 50 MG/1
50 TABLET, FILM COATED ORAL
Status: DISCONTINUED | OUTPATIENT
Start: 2020-03-03 | End: 2020-03-04 | Stop reason: HOSPADM

## 2020-03-03 RX ORDER — SODIUM CHLORIDE, SODIUM GLUCONATE, SODIUM ACETATE, POTASSIUM CHLORIDE, MAGNESIUM CHLORIDE, SODIUM PHOSPHATE, DIBASIC, AND POTASSIUM PHOSPHATE .53; .5; .37; .037; .03; .012; .00082 G/100ML; G/100ML; G/100ML; G/100ML; G/100ML; G/100ML; G/100ML
75 INJECTION, SOLUTION INTRAVENOUS CONTINUOUS
Status: DISCONTINUED | OUTPATIENT
Start: 2020-03-03 | End: 2020-03-04 | Stop reason: HOSPADM

## 2020-03-03 RX ORDER — ZINC SULFATE 50(220)MG
220 CAPSULE ORAL DAILY
Status: DISCONTINUED | OUTPATIENT
Start: 2020-03-04 | End: 2020-03-04 | Stop reason: HOSPADM

## 2020-03-03 RX ORDER — TOPIRAMATE 100 MG/1
100 TABLET, FILM COATED ORAL 2 TIMES DAILY
Status: DISCONTINUED | OUTPATIENT
Start: 2020-03-03 | End: 2020-03-04 | Stop reason: HOSPADM

## 2020-03-03 RX ORDER — POTASSIUM CHLORIDE 14.9 MG/ML
20 INJECTION INTRAVENOUS
Status: COMPLETED | OUTPATIENT
Start: 2020-03-03 | End: 2020-03-04

## 2020-03-03 RX ORDER — METHYLPREDNISOLONE 4 MG/1
4 TABLET ORAL
Status: DISCONTINUED | OUTPATIENT
Start: 2020-03-06 | End: 2020-03-04 | Stop reason: HOSPADM

## 2020-03-03 RX ORDER — MULTIVITAMIN/IRON/FOLIC ACID 18MG-0.4MG
1 TABLET ORAL
Status: DISCONTINUED | OUTPATIENT
Start: 2020-03-03 | End: 2020-03-04 | Stop reason: HOSPADM

## 2020-03-03 RX ORDER — ONDANSETRON 2 MG/ML
4 INJECTION INTRAMUSCULAR; INTRAVENOUS EVERY 6 HOURS PRN
Status: DISCONTINUED | OUTPATIENT
Start: 2020-03-03 | End: 2020-03-04 | Stop reason: HOSPADM

## 2020-03-03 RX ORDER — AMILORIDE HYDROCHLORIDE 5 MG/1
5 TABLET ORAL DAILY
Status: DISCONTINUED | OUTPATIENT
Start: 2020-03-04 | End: 2020-03-04 | Stop reason: HOSPADM

## 2020-03-03 RX ORDER — POTASSIUM CHLORIDE 14.9 MG/ML
20 INJECTION INTRAVENOUS
Status: COMPLETED | OUTPATIENT
Start: 2020-03-03 | End: 2020-03-03

## 2020-03-03 RX ORDER — THIAMINE MONONITRATE (VIT B1) 100 MG
100 TABLET ORAL DAILY
Status: DISCONTINUED | OUTPATIENT
Start: 2020-03-04 | End: 2020-03-04 | Stop reason: HOSPADM

## 2020-03-03 RX ORDER — POTASSIUM CHLORIDE 29.8 MG/ML
40 INJECTION INTRAVENOUS ONCE
Status: DISCONTINUED | OUTPATIENT
Start: 2020-03-03 | End: 2020-03-03

## 2020-03-03 RX ADMIN — POTASSIUM CHLORIDE 40 MEQ: 20 SOLUTION ORAL at 17:32

## 2020-03-03 RX ADMIN — POTASSIUM CHLORIDE 40 MEQ: 20 SOLUTION ORAL at 22:25

## 2020-03-03 RX ADMIN — POTASSIUM CHLORIDE 20 MEQ: 14.9 INJECTION, SOLUTION INTRAVENOUS at 14:39

## 2020-03-03 RX ADMIN — POTASSIUM CHLORIDE 20 MEQ: 14.9 INJECTION, SOLUTION INTRAVENOUS at 22:25

## 2020-03-03 RX ADMIN — POTASSIUM CHLORIDE 20 MEQ: 14.9 INJECTION, SOLUTION INTRAVENOUS at 20:12

## 2020-03-03 RX ADMIN — MECLIZINE HYDROCHLORIDE 25 MG: 25 TABLET ORAL at 22:25

## 2020-03-03 RX ADMIN — TOPIRAMATE 100 MG: 100 TABLET, FILM COATED ORAL at 17:32

## 2020-03-03 RX ADMIN — SODIUM BICARBONATE 650 MG TABLET 1300 MG: at 17:32

## 2020-03-03 RX ADMIN — SODIUM CHLORIDE, SODIUM GLUCONATE, SODIUM ACETATE, POTASSIUM CHLORIDE, MAGNESIUM CHLORIDE, SODIUM PHOSPHATE, DIBASIC, AND POTASSIUM PHOSPHATE 75 ML/HR: .53; .5; .37; .037; .03; .012; .00082 INJECTION, SOLUTION INTRAVENOUS at 14:48

## 2020-03-03 RX ADMIN — POTASSIUM CHLORIDE 40 MEQ: 20 SOLUTION ORAL at 14:40

## 2020-03-03 RX ADMIN — MULTIPLE VITAMINS W/ MINERALS TAB 1 TABLET: TAB at 17:32

## 2020-03-03 RX ADMIN — POTASSIUM CHLORIDE 20 MEQ: 14.9 INJECTION, SOLUTION INTRAVENOUS at 17:32

## 2020-03-03 RX ADMIN — AMITRIPTYLINE HYDROCHLORIDE 50 MG: 50 TABLET, FILM COATED ORAL at 22:25

## 2020-03-03 RX ADMIN — POTASSIUM CHLORIDE 20 MEQ: 14.9 INJECTION, SOLUTION INTRAVENOUS at 12:21

## 2020-03-03 NOTE — PLAN OF CARE
Problem: PAIN - ADULT  Goal: Verbalizes/displays adequate comfort level or baseline comfort level  Description  Interventions:  - Encourage patient to monitor pain and request assistance  - Assess pain using appropriate pain scale  - Administer analgesics based on type and severity of pain and evaluate response  - Implement non-pharmacological measures as appropriate and evaluate response  - Consider cultural and social influences on pain and pain management  - Notify physician/advanced practitioner if interventions unsuccessful or patient reports new pain  Outcome: Progressing     Problem: INFECTION - ADULT  Goal: Absence or prevention of progression during hospitalization  Description  INTERVENTIONS:  - Assess and monitor for signs and symptoms of infection  - Monitor lab/diagnostic results  - Monitor all insertion sites, i e  indwelling lines, tubes, and drains  - Monitor endotracheal if appropriate and nasal secretions for changes in amount and color  - [place] appropriate cooling/warming therapies per order  - Administer medications as ordered  - Instruct and encourage patient and family to use good hand hygiene technique  - Identify and instruct in appropriate isolation precautions for identified infection/condition  Outcome: Progressing     Problem: SAFETY ADULT  Goal: Patient will remain free of falls  Description  INTERVENTIONS:  - Assess patient frequently for physical needs  -  Identify cognitive and physical deficits and behaviors that affect risk of falls    -  [place] fall precautions as indicated by assessment   - Educate patient/family on patient safety including physical limitations  - Instruct patient to call for assistance with activity based on assessment  - Modify environment to reduce risk of injury  - Consider OT/PT consult to assist with strengthening/mobility  Outcome: Progressing  Goal: Maintain or return to baseline ADL function  Description  INTERVENTIONS:  -  Assess patient's ability to carry out ADLs; assess patient's baseline for ADL function and identify physical deficits which impact ability to perform ADLs (bathing, care of mouth/teeth, toileting, grooming, dressing, etc )  - Assess/evaluate cause of self-care deficits   - Assess range of motion  - Assess patient's mobility; develop plan if impaired  - Assess patient's need for assistive devices and provide as appropriate  - Encourage maximum independence but intervene and supervise when necessary  - Involve family in performance of ADLs  - Assess for home care needs following discharge   - Consider OT consult to assist with ADL evaluation and planning for discharge  - Provide patient education as appropriate  Outcome: Progressing  Goal: Maintain or return mobility status to optimal level  Description  INTERVENTIONS:  - Assess patient's baseline mobility status (ambulation, transfers, stairs, etc )    - Identify cognitive and physical deficits and behaviors that affect mobility  - Identify mobility aids required to assist with transfers and/or ambulation (gait belt, sit-to-stand, lift, walker, cane, etc )  - [place] fall precautions as indicated by assessment  - Record patient progress and toleration of activity level on Mobility SBAR; progress patient to next Phase/Stage  - Instruct patient to call for assistance with activity based on assessment  - Consider rehabilitation consult to assist with strengthening/weightbearing, etc   Outcome: Progressing     Problem: DISCHARGE PLANNING  Goal: Discharge to home or other facility with appropriate resources  Description  INTERVENTIONS:  - Identify barriers to discharge w/patient and caregiver  - Arrange for needed discharge resources and transportation as appropriate  - Identify discharge learning needs (meds, wound care, etc )  - Arrange for interpretive services to assist at discharge as needed  - Refer to Case Management Department for coordinating discharge planning if the patient needs post-hospital services based on physician/advanced practitioner order or complex needs related to functional status, cognitive ability, or social support system  Outcome: Progressing     Problem: Knowledge Deficit  Goal: Patient/family/caregiver demonstrates understanding of disease process, treatment plan, medications, and discharge instructions  Description  Complete learning assessment and assess knowledge base    Interventions:  - Provide teaching at level of understanding  - Provide teaching via preferred learning methods  Outcome: Progressing

## 2020-03-03 NOTE — H&P
H&P- Burgess Locke 1985, 38 y o  female MRN: [id number]    Unit/Bed#: 280-15 Encounter: [id number]    Primary Care Provider: Sierra Ureña DO   Date and time admitted to hospital: 3/3/2020 10:12 AM        * Hypokalemia  Assessment & Plan  Recurrent hypokalemia with multiple hospitalizations for this  Patient receives potassium infusions biweekly outpatient  Patient's acute on chronic hypokalemia as likely due to recent strep infection and reduced oral intake  - admit for observation  - proceed with IV potassium replacement - the patient with history of malabsorption likely due to gastric bypass status  - continue p o  KCl to 200 mEq daily  - BMP in a m  History of gastric bypass  Assessment & Plan  With resultant chronic malabsorption  Continue vitamin replacement    Middle ear effusion  Assessment & Plan  Continue Medrol pack  Outpatient referral to ENT      VTE Prophylaxis: None, low risk   / sequential compression device   Code Status: Full  POLST: POLST form is not discussed and not completed at this time  Discussion with family: Patient    Anticipated Length of Stay:  Patient will be admitted on an Observation basis with an anticipated length of stay of less than 2 midnights  Justification for Hospital Stay: IV KCl    Total Time for Visit, including Counseling / Coordination of Care: 1 hour  Greater than 50% of this total time spent on direct patient counseling and coordination of care  Chief Complaint:   low potassium    History of Present Illness:    Burgess Locke is a 38 y o  female with history of gastric bypass surgery and chronic hypokalemia who presents with hypokalemia as determined on outpatient basis  The patient is noted for recent streptococcal pharyngitis because a which she reports decreased oral intake likely causing acute on chronic hypokalemia    The patient also notes that she was recently diagnosed with bilateral middle ear effusion for which she has been on Medrol pack which has also lost some GI irritation and decreased her appetite and oral intake  Patient denies any episodes of vomiting  Denies diarrhea  She states pharyngitis has resolved and denies pain with swallowing  Review of Systems:    Review of Systems   Constitutional: Positive for fatigue  Negative for fever  HENT:        Ear pressure    Eyes: Negative for visual disturbance  Respiratory: Negative for shortness of breath  Cardiovascular: Negative for chest pain and leg swelling  Gastrointestinal: Positive for nausea  Negative for abdominal pain, constipation, diarrhea and vomiting  Endocrine: Negative for polydipsia and polyuria  Genitourinary: Negative for dysuria  Musculoskeletal: Negative for back pain  Skin: Negative for rash  Neurological: Negative for dizziness  Hematological: Negative for adenopathy  Psychiatric/Behavioral: Negative for confusion  Past Medical and Surgical History:     Past Medical History:   Diagnosis Date    H  pylori infection     Hypokalemia     Migraine     Renal disorder     Rhabdomyolysis        Past Surgical History:   Procedure Laterality Date    ABDOMINAL SURGERY      APPENDECTOMY       SECTION      CHOLECYSTECTOMY      COSMETIC SURGERY      FL GUIDED CENTRAL VENOUS ACCESS DEVICE INSERTION  2018    GASTRIC BYPASS      HYSTERECTOMY      TUNNELED VENOUS PORT PLACEMENT N/A 2018    Procedure: INSERTION VENOUS PORT (PORT-A-CATH); Surgeon: Jeferson Ordonez DO;  Location: [facility];  Service: General       Meds/Allergies:    Prior to Admission medications    Medication Sig Start Date End Date Taking?  Authorizing Provider   AMILoride 5 mg tablet Take 1 tablet (5 mg total) by mouth daily 19  Yes Samanta Castellon DO   b complex-C-folic acid (NEPHRO-EDIE) 0 8 mg tablet Take 0 8 mg by mouth daily with dinner   Yes Historical Provider, MD   ergocalciferol (ERGOCALCIFEROL) 85313 units capsule Take 50,000 Units by mouth once a week Patient takes this med on Mondays    Yes Historical Provider, MD   ferrous sulfate 325 (65 Fe) mg tablet Take 325 mg by mouth every other day Took on 2/24/20   Yes Historical Provider, MD   meclizine (ANTIVERT) 25 mg tablet Take 25 mg by mouth daily at bedtime 8/27/18  Yes Historical Provider, MD   methylPREDNISolone 4 MG tablet therapy pack Use as directed on package 3/1/20  Yes Casper Rubio MD   ondansetron [facility]) 4 mg tablet Take 1 tablet (4 mg total) by mouth every 8 (eight) hours as needed for nausea or vomiting 3/1/20  Yes Casper Rubio MD   sodium bicarbonate 650 mg tablet Take 2 tablets (1,300 mg total) by mouth 2 (two) times a day for 5 days 2/27/20 3/3/20 Yes [employer],    SUMAtriptan (IMITREX) 100 mg tablet Take 100 mg by mouth once as needed for migraine    Yes Historical Provider, MD   thiamine 100 MG tablet Take 100 mg by mouth daily   Yes Historical Provider, MD   topiramate (TOPAMAX) 100 mg tablet Take 100 mg by mouth 2 (two) times a day   Yes Historical Provider, MD   zinc sulfate (ZINCATE) 220 mg capsule Take 1 capsule (220 mg total) by mouth daily 8/9/19  Yes Alli Morse MD   amitriptyline (ELAVIL) 50 mg tablet Take 50 mg by mouth daily at bedtime 8/27/18 3/1/20  Historical Provider, MD   potassium chloride 10 % oral solution Take 30 mL (40 mEq total) by mouth 5 (five) times a day 10/2/19 3/1/20  ARIADNA John     I have reviewed home medications with a medical source (PCP, Pharmacy, other)  Allergies: Allergies   Allergen Reactions    Nsaids Other (See Comments)     Other reaction(s): Other (Please comment)  Should not take s/p bariatric surgery  Other reaction(s):  Other (See Comments)  Should not take as per prior records  Should not take s/p bariatric surgery  Has hx of gastric bypass      Prednisone      Nausea, vomiting, diarrhea       Social History:     Marital Status: /Civil Union   Occupation: not addressed  Patient Pre-hospital Living Situation: with family  Patient Pre-hospital Level of Mobility: independent  Patient Pre-hospital Diet Restrictions: none  Substance Use History:   Social History     Substance and Sexual Activity   Alcohol Use Never    Alcohol/week: 0 0 standard drinks    Frequency: Never    Drinks per session: Patient refused    Binge frequency: Never    Comment: 0     Social History     Tobacco Use   Smoking Status Never Smoker   Smokeless Tobacco Never Used     Social History     Substance and Sexual Activity   Drug Use No       Family History:    Family History   Problem Relation Age of Onset    No Known Problems Mother     Hypertension Father     Diabetes Father     Diabetes Maternal Grandfather        Physical Exam:     Vitals:   Blood Pressure: 107/63 (03/03/20 1512)  Pulse: (!) 50 (03/03/20 1512)  Temperature: 97 9 °F (36 6 °C) (03/03/20 1255)  Temp Source: Temporal (03/03/20 1016)  Respirations: 18 (03/03/20 1512)  Height: 5' 3" (160 cm) (03/03/20 1016)  Weight - Scale: 71 7 kg (158 lb 1 1 oz) (03/03/20 1016)  SpO2: 97 % (03/03/20 1512)    Physical Exam   Constitutional: She is oriented to person, place, and time  No distress  HENT:   Head: Normocephalic and atraumatic  Eyes: Conjunctivae are normal    Neck: No JVD present  Cardiovascular: Normal rate and regular rhythm  No murmur heard  Pulmonary/Chest: Effort normal  No respiratory distress  She has no wheezes  She has no rales  Abdominal: Soft  She exhibits no distension  There is no tenderness  There is no guarding  Musculoskeletal: She exhibits no edema  Neurological: She is alert and oriented to person, place, and time  Skin: Skin is warm and dry  Psychiatric: She has a normal mood and affect  Additional Data:     Lab Results: I have personally reviewed pertinent reports        Results from last 7 days   Lab Units 03/03/20  1119   WBC Thousand/uL 7 51   HEMOGLOBIN g/dL 12 3   HEMATOCRIT % 37 0   PLATELETS Thousands/uL 317   NEUTROS PCT % 56   LYMPHS PCT % 35   MONOS PCT % 7   EOS PCT % 1     Results from last 7 days   Lab Units 03/03/20  1119   SODIUM mmol/L 141   POTASSIUM mmol/L 2 1*   CHLORIDE mmol/L 107   CO2 mmol/L 18*   BUN mg/dL 11   CREATININE mg/dL 0 92   ANION GAP mmol/L 16*   CALCIUM mg/dL 7 9*   ALBUMIN g/dL 3 7   TOTAL BILIRUBIN mg/dL 0 30   ALK PHOS U/L 46   ALT U/L 24   AST U/L 16   GLUCOSE RANDOM mg/dL 79                 Results from last 7 days   Lab Units 02/26/20  0501   LACTIC ACID mmol/L 0 6   PROCALCITONIN ng/ml <0 05       Imaging: I have personally reviewed pertinent reports  No orders to display       EKG, Pathology, and Other Studies Reviewed on Admission:   · EKG: no acute changes    Allscripts / Epic Records Reviewed: Yes     ** Please Note: This note has been constructed using a voice recognition system   **

## 2020-03-03 NOTE — CASE MANAGEMENT
I self referred the patient do to her recent discharge from [facility] on 2/27/2020  The patient stated she was at her Infusion appointment today and they sent her to the ED for critical Labs  The patient stated she picked up all of her discharge medications  She has a Follow up appointment on 3/13/2020 with Endocrinology  The patient lives with her  in a one story house  No CARLOS  She has no DME  She does not receive meals on wheels or home health services at this time  She is independent with her ADL's and she drives  She uses [employer] in [email or web address]  She has no trouble getting or paying for her medications  She has Commercial miscellaneous insurance which is a form of [id number]  [employer] [facility]

## 2020-03-03 NOTE — ED PROVIDER NOTES
History  Chief Complaint   Patient presents with    Low Potassium     WAs in infusion for Potassiun replacement  K+ was reported at 2 2  Dr Alexander Montesinos wanted patient seen in ED  38year old female presents to the ED from outpatient infusion center for evaluation of low potassium  Pt has long standing history of hypokalemia  She has a significant history of hypokalemia of unknown etiology  She has had extensive work up at [facility] per pt report  She now follows with nephrology and gets biweekly infusions  She presented to outpatient infusion and was subsequently referred to ED secondary to K of 2 2  She notes her infusions have been keeping her value in an acceptable range  However she notes recent illness with strep throat and subsequent ear infection  Has been on recent steroid  She notes this upset her stomach and caused nausea/vomiting  Has been taking zofran to help her eat  She notes diet overall poor with recent illness and has been drinking broths and eating jello  C/o weakness/fatigue  She usually notes numbness intermittently in face and hands which usually occurs for her when her potassium is low, however has not felt this recently  Denies fever, chills, cough, congestion  She notes her ears still feel blocked and has post nasal drip  Denies chest pain, SOB, diarrhea, abdominal pain  She has been taking her home potassium solution daily as prescribed (reports 200 mEQ daily)  She does indicate she doesn't tolerate the oral pills and they just "hang around in my stomach and don't dissolve"  History provided by:  Patient and medical records   used: No        Prior to Admission Medications   Prescriptions Last Dose Informant Patient Reported? Taking?    AMILoride 5 mg tablet  Self No No   Sig: Take 1 tablet (5 mg total) by mouth daily   SUMAtriptan (IMITREX) 100 mg tablet   Yes No   Sig: Take 100 mg by mouth once as needed for migraine    amitriptyline (ELAVIL) 50 mg tablet   Yes No   Sig: Take 50 mg by mouth daily at bedtime   b complex-C-folic acid (NEPHRO-EDIE) 0 8 mg tablet   Yes No   Sig: Take 0 8 mg by mouth daily with dinner   ergocalciferol (ERGOCALCIFEROL) 02935 units capsule   Yes No   Sig: Take 50,000 Units by mouth once a week Patient takes this med on     ferrous sulfate 325 (65 Fe) mg tablet   Yes No   Sig: Take 325 mg by mouth every other day Took on 20   meclizine (ANTIVERT) 25 mg tablet   Yes No   Sig: Take 25 mg by mouth daily at bedtime   methylPREDNISolone 4 MG tablet therapy pack   No No   Sig: Use as directed on package   ondansetron (ZOFRAN) 4 mg tablet   No No   Sig: Take 1 tablet (4 mg total) by mouth every 8 (eight) hours as needed for nausea or vomiting   potassium chloride 10 % oral solution   No No   Sig: Take 30 mL (40 mEq total) by mouth 5 (five) times a day   sodium bicarbonate 650 mg tablet   No No   Sig: Take 2 tablets (1,300 mg total) by mouth 2 (two) times a day for 5 days   thiamine 100 MG tablet   Yes No   Sig: Take 100 mg by mouth daily   topiramate (TOPAMAX) 100 mg tablet   Yes No   Sig: Take 100 mg by mouth 2 (two) times a day   zinc sulfate (ZINCATE) 220 mg capsule   No No   Sig: Take 1 capsule (220 mg total) by mouth daily      Facility-Administered Medications: None       Past Medical History:   Diagnosis Date    H  pylori infection     Hypokalemia     Migraine     Renal disorder     Rhabdomyolysis        Past Surgical History:   Procedure Laterality Date    ABDOMINAL SURGERY      APPENDECTOMY       SECTION      CHOLECYSTECTOMY      COSMETIC SURGERY      FL GUIDED CENTRAL VENOUS ACCESS DEVICE INSERTION  2018    GASTRIC BYPASS      HYSTERECTOMY      TUNNELED VENOUS PORT PLACEMENT N/A 2018    Procedure: INSERTION VENOUS PORT (PORT-A-CATH);   Surgeon: Sierra Craft DO;  Location: [facility];  Service: General       Family History   Problem Relation Age of Onset    No Known Problems Mother  Hypertension Father     Diabetes Father     Diabetes Maternal Grandfather      I have reviewed and agree with the history as documented  E-Cigarette/Vaping    E-Cigarette Use Never User      E-Cigarette/Vaping Substances     Social History     Tobacco Use    Smoking status: Never Smoker    Smokeless tobacco: Never Used   Substance Use Topics    Alcohol use: Never     Alcohol/week: 0 0 standard drinks     Frequency: Never     Drinks per session: Patient refused     Binge frequency: Never     Comment: 0    Drug use: No       Review of Systems   Constitutional: Positive for fatigue  Negative for chills and fever  HENT: Positive for ear pain and postnasal drip  Negative for congestion, rhinorrhea and sore throat  Eyes: Negative  Negative for visual disturbance  Respiratory: Negative  Negative for cough, shortness of breath and wheezing  Cardiovascular: Negative  Negative for chest pain, palpitations and leg swelling  Gastrointestinal: Negative  Negative for abdominal pain, constipation, diarrhea, nausea and vomiting  Genitourinary: Negative  Negative for dysuria, flank pain, frequency and hematuria  Musculoskeletal: Positive for myalgias  Negative for back pain  Skin: Negative  Negative for rash  Neurological: Negative  Negative for dizziness, light-headedness, numbness and headaches  Psychiatric/Behavioral: Negative  Negative for confusion  All other systems reviewed and are negative  Physical Exam  Physical Exam   Constitutional: She is oriented to person, place, and time  She appears well-developed and well-nourished  No distress  HENT:   Head: Normocephalic and atraumatic  Right Ear: Hearing, tympanic membrane, external ear and ear canal normal    Left Ear: Hearing, tympanic membrane, external ear and ear canal normal    Nose: Nose normal    Mouth/Throat: Uvula is midline and mucous membranes are normal  Posterior oropharyngeal erythema present   No oropharyngeal exudate or posterior oropharyngeal edema  Eyes: Pupils are equal, round, and reactive to light  Conjunctivae and EOM are normal  No scleral icterus  Neck: Trachea normal and normal range of motion  Neck supple  No tracheal deviation present  Cardiovascular: Normal rate, regular rhythm, normal heart sounds, intact distal pulses and normal pulses  No murmur heard  Pulmonary/Chest: Effort normal and breath sounds normal  No respiratory distress  She has no wheezes  She has no rhonchi  She has no rales  Abdominal: Soft  Bowel sounds are normal  There is no tenderness  There is no rebound, no guarding and no CVA tenderness  Musculoskeletal: Normal range of motion  She exhibits no edema or tenderness  Neurological: She is alert and oriented to person, place, and time  She has normal reflexes  No cranial nerve deficit or sensory deficit  She exhibits normal muscle tone  Coordination and gait normal  GCS eye subscore is 4  GCS verbal subscore is 5  GCS motor subscore is 6  Ambulates without assistance  Skin: Skin is warm and dry  Capillary refill takes less than 2 seconds  No rash noted  Psychiatric: She has a normal mood and affect  Her speech is normal and behavior is normal    Nursing note and vitals reviewed        Vital Signs  ED Triage Vitals [03/03/20 1016]   Temperature Pulse Respirations Blood Pressure SpO2   (!) 97 3 °F (36 3 °C) 93 18 131/63 100 %      Temp Source Heart Rate Source Patient Position - Orthostatic VS BP Location FiO2 (%)   Temporal Monitor Sitting Right arm --      Pain Score       No Pain           Vitals:    03/03/20 1016 03/03/20 1121 03/03/20 1255   BP: 131/63 115/55 107/61   Pulse: 93 77 74   Patient Position - Orthostatic VS: Sitting Lying          Visual Acuity      ED Medications  Medications   potassium chloride 20 mEq IVPB (premix) (20 mEq Intravenous New Bag 3/3/20 1221)       Diagnostic Studies  Results Reviewed     Procedure Component Value Units Date/Time Comprehensive metabolic panel [[id number]]  (Abnormal) Collected:  03/03/20 1119    Lab Status:  Final result Specimen:  Blood from Line, Venous Updated:  03/03/20 1144     Sodium 141 mmol/L      Potassium 2 1 mmol/L      Chloride 107 mmol/L      CO2 18 mmol/L      ANION GAP 16 mmol/L      BUN 11 mg/dL      Creatinine 0 92 mg/dL      Glucose 79 mg/dL      Calcium 7 9 mg/dL      AST 16 U/L      ALT 24 U/L      Alkaline Phosphatase 46 U/L      Total Protein 7 0 g/dL      Albumin 3 7 g/dL      Total Bilirubin 0 30 mg/dL      eGFR 80 ml/min/1 73sq m     Narrative:       National Kidney Disease Foundation guidelines for Chronic Kidney Disease (CKD):     Stage 1 with normal or high GFR (GFR > 90 mL/min/1 73 square meters)    Stage 2 Mild CKD (GFR = 60-89 mL/min/1 73 square meters)    Stage 3A Moderate CKD (GFR = 45-59 mL/min/1 73 square meters)    Stage 3B Moderate CKD (GFR = 30-44 mL/min/1 73 square meters)    Stage 4 Severe CKD (GFR = 15-29 mL/min/1 73 square meters)    Stage 5 End Stage CKD (GFR <15 mL/min/1 73 square meters)  Note: GFR calculation is accurate only with a steady state creatinine    Magnesium [[id number]]  (Normal) Collected:  03/03/20 1119    Lab Status:  Final result Specimen:  Blood from Line, Venous Updated:  03/03/20 1133     Magnesium 2 0 mg/dL     CBC and differential [[id number]] Collected:  03/03/20 1119    Lab Status:  Final result Specimen:  Blood from Line, Venous Updated:  03/03/20 1126     WBC 7 51 Thousand/uL      RBC 4 10 Million/uL      Hemoglobin 12 3 g/dL      Hematocrit 37 0 %      MCV 90 fL      MCH 30 0 pg      MCHC 33 2 g/dL      RDW 14 3 %      MPV 10 4 fL      Platelets 287 Thousands/uL      nRBC 0 /100 WBCs      Neutrophils Relative 56 %      Immat GRANS % 0 %      Lymphocytes Relative 35 %      Monocytes Relative 7 %      Eosinophils Relative 1 %      Basophils Relative 1 %      Neutrophils Absolute 4 15 Thousands/µL      Immature Grans Absolute 0 02 Thousand/uL Lymphocytes Absolute 2 60 Thousands/µL      Monocytes Absolute 0 55 Thousand/µL      Eosinophils Absolute 0 10 Thousand/µL      Basophils Absolute 0 09 Thousands/µL                  No orders to display              Procedures  ECG 12 Lead Documentation Only  Date/Time: 3/3/2020 11:12 AM  Performed by: Sylvain Palumbo PA-C  Authorized by: Sylvain Palumbo PA-C     Indications / Diagnosis:  Hypokalemia  ECG reviewed by me, the ED Provider: yes    Patient location:  ED  Previous ECG:     Previous ECG:  Compared to current    Comparison ECG info:  12/29/19    Similarity:  Changes noted    Comparison to cardiac monitor: Yes    Interpretation:     Interpretation: abnormal    Rate:     ECG rate:  78    ECG rate assessment: normal    Rhythm:     Rhythm: sinus rhythm    Ectopy:     Ectopy: none    QRS:     QRS axis:  Normal    QRS intervals:  Normal  Conduction:     Conduction: normal    ST segments:     ST segments:  Normal  T waves:     T waves: non-specific    Comments:      , QRS 98, QT/QTc 356/405; no acute ischemic changes; nonspecific T wave changes similar to prior  ED Course  ED Course as of Mar 03 1258   Tue Mar 03, 2020   1135 Previous records reviewed  Anticipate need for admission with hypokalemia; pt in agreeance and indicates she was sent over for this reason  1136 WBC: 7 51   1136 Hemoglobin: 12 3   1136 Platelet Count: 270   1136 Magnesium: 2 0   1152 Glucose, Random: 79   1152 Creatinine: 0 92   1152 BUN: 11   1152 Sodium: 141   1152 Will give IV supplementation  Potassium(!!): 2 1   1152 Chloride: 107   1152 CO2(!): 18   1152 Anion Gap(!): 16   1152 Calcium(!): 7 9   1152 AST: 16   1152 ALT: 24   1152 Alkaline Phosphatase: 46   1152 Total Protein: 7 0   1152 Albumin: 3 7   1152 TOTAL BILIRUBIN: 0 30   1152 eGFR: 80   1158 Tiger Text sent to on call [facility] Dr Destiny Simon to discuss admission  [address] to Dr Luz Peralta via telephone in regards to pt as she is familiar with pt  Will accept for tele-obs       2185 Pt updated; agrees with admission/treatment plan  Has no further questions at this time  Pt afebrile and hemodynamically stable  Pt in agreeance with admission/treatment plan  Will admit tele-obs  Pt verbalized understanding  MDM  Number of Diagnoses or Management Options  Hypokalemia: established and worsening     Amount and/or Complexity of Data Reviewed  Clinical lab tests: ordered and reviewed  Decide to obtain previous medical records or to obtain history from someone other than the patient: yes  Obtain history from someone other than the patient: yes  Review and summarize past medical records: yes  Discuss the patient with other providers: yes (Attending, SLIM)  Independent visualization of images, tracings, or specimens: yes    Patient Progress  Patient progress: stable        Disposition  Final diagnoses:   Hypokalemia     Time reflects when diagnosis was documented in both MDM as applicable and the Disposition within this note     Time User Action Codes Description Comment    3/3/2020 11:51 AM Alessandra Pauld Add [E87 6] Hypokalemia       ED Disposition     ED Disposition Condition Date/Time Comment    Admit Stable Tue Mar 3, 2020 12:05 PM Case was discussed with Dr Darryl Benedict and the patient's admission status was agreed to be Admission Status: observation status to the service of Dr Darryl Benedict          Follow-up Information    None         Current Discharge Medication List      CONTINUE these medications which have NOT CHANGED    Details   AMILoride 5 mg tablet Take 1 tablet (5 mg total) by mouth daily  Qty: 30 tablet, Refills: 0    Associated Diagnoses: Hypokalemia      amitriptyline (ELAVIL) 50 mg tablet Take 50 mg by mouth daily at bedtime      b complex-C-folic acid (NEPHRO-EDIE) 0 8 mg tablet Take 0 8 mg by mouth daily with dinner      ergocalciferol (ERGOCALCIFEROL) 90056 units capsule Take 50,000 Units by mouth once a week Patient takes this med on Mondays       ferrous sulfate 325 (65 Fe) mg tablet Take 325 mg by mouth every other day Took on 2/24/20      meclizine (ANTIVERT) 25 mg tablet Take 25 mg by mouth daily at bedtime      methylPREDNISolone 4 MG tablet therapy pack Use as directed on package  Qty: 21 tablet, Refills: 0    Associated Diagnoses: Sore throat; Viral syndrome; Disorder of both eustachian tubes      ondansetron (ZOFRAN) 4 mg tablet Take 1 tablet (4 mg total) by mouth every 8 (eight) hours as needed for nausea or vomiting  Qty: 8 tablet, Refills: 0    Associated Diagnoses: Viral syndrome      potassium chloride 10 % oral solution Take 30 mL (40 mEq total) by mouth 5 (five) times a day  Qty: 4500 mL, Refills: 0    Associated Diagnoses: Hypokalemia      sodium bicarbonate 650 mg tablet Take 2 tablets (1,300 mg total) by mouth 2 (two) times a day for 5 days  Qty: 20 tablet, Refills: 0    Associated Diagnoses: Normal anion gap metabolic acidosis      SUMAtriptan (IMITREX) 100 mg tablet Take 100 mg by mouth once as needed for migraine       thiamine 100 MG tablet Take 100 mg by mouth daily      topiramate (TOPAMAX) 100 mg tablet Take 100 mg by mouth 2 (two) times a day      zinc sulfate (ZINCATE) 220 mg capsule Take 1 capsule (220 mg total) by mouth daily  Qty: 30 capsule, Refills: 0    Associated Diagnoses: Gastric bypass status for obesity           No discharge procedures on file      PDMP Review     None          ED Provider  Electronically Signed by           Sheridan Martin PA-C  03/03/20 3786

## 2020-03-03 NOTE — PROGRESS NOTES
Report called to [facility] regarding patients critical lab value of 2 2  Patient taken to the ED placed in room 14  [facility]

## 2020-03-03 NOTE — ASSESSMENT & PLAN NOTE
Recurrent hypokalemia with multiple hospitalizations for this  Patient receives potassium infusions biweekly outpatient  Patient's acute on chronic hypokalemia as likely due to recent strep infection and reduced oral intake  - admit for observation  - proceed with IV potassium replacement - the patient with history of malabsorption likely due to gastric bypass status  - continue p o  KCl to 200 mEq daily  - BMP in a m

## 2020-03-04 VITALS
TEMPERATURE: 97.9 F | RESPIRATION RATE: 18 BRPM | WEIGHT: 158.07 LBS | HEART RATE: 72 BPM | DIASTOLIC BLOOD PRESSURE: 53 MMHG | SYSTOLIC BLOOD PRESSURE: 101 MMHG | OXYGEN SATURATION: 100 % | HEIGHT: 63 IN | BODY MASS INDEX: 28.01 KG/M2

## 2020-03-04 LAB
ANION GAP SERPL CALCULATED.3IONS-SCNC: 11 MMOL/L (ref 4–13)
ATRIAL RATE: 78 BPM
BASOPHILS # BLD AUTO: 0.07 THOUSANDS/ΜL (ref 0–0.1)
BASOPHILS NFR BLD AUTO: 1 % (ref 0–1)
BUN SERPL-MCNC: 15 MG/DL (ref 5–25)
CALCIUM SERPL-MCNC: 7.7 MG/DL (ref 8.3–10.1)
CHLORIDE SERPL-SCNC: 111 MMOL/L (ref 100–108)
CO2 SERPL-SCNC: 19 MMOL/L (ref 21–32)
CREAT SERPL-MCNC: 0.83 MG/DL (ref 0.6–1.3)
EOSINOPHIL # BLD AUTO: 0.17 THOUSAND/ΜL (ref 0–0.61)
EOSINOPHIL NFR BLD AUTO: 3 % (ref 0–6)
ERYTHROCYTE [DISTWIDTH] IN BLOOD BY AUTOMATED COUNT: 14.6 % (ref 11.6–15.1)
GFR SERPL CREATININE-BSD FRML MDRD: 91 ML/MIN/1.73SQ M
GLUCOSE SERPL-MCNC: 76 MG/DL (ref 65–140)
HCT VFR BLD AUTO: 32 % (ref 34.8–46.1)
HGB BLD-MCNC: 10.4 G/DL (ref 11.5–15.4)
IMM GRANULOCYTES # BLD AUTO: 0.01 THOUSAND/UL (ref 0–0.2)
IMM GRANULOCYTES NFR BLD AUTO: 0 % (ref 0–2)
LYMPHOCYTES # BLD AUTO: 2.76 THOUSANDS/ΜL (ref 0.6–4.47)
LYMPHOCYTES NFR BLD AUTO: 49 % (ref 14–44)
MCH RBC QN AUTO: 29.8 PG (ref 26.8–34.3)
MCHC RBC AUTO-ENTMCNC: 32.5 G/DL (ref 31.4–37.4)
MCV RBC AUTO: 92 FL (ref 82–98)
MONOCYTES # BLD AUTO: 0.41 THOUSAND/ΜL (ref 0.17–1.22)
MONOCYTES NFR BLD AUTO: 7 % (ref 4–12)
NEUTROPHILS # BLD AUTO: 2.26 THOUSANDS/ΜL (ref 1.85–7.62)
NEUTS SEG NFR BLD AUTO: 40 % (ref 43–75)
NRBC BLD AUTO-RTO: 0 /100 WBCS
P AXIS: 49 DEGREES
PLATELET # BLD AUTO: 261 THOUSANDS/UL (ref 149–390)
PMV BLD AUTO: 10.4 FL (ref 8.9–12.7)
POTASSIUM SERPL-SCNC: 3.4 MMOL/L (ref 3.5–5.3)
PR INTERVAL: 116 MS
QRS AXIS: 36 DEGREES
QRSD INTERVAL: 98 MS
QT INTERVAL: 356 MS
QTC INTERVAL: 405 MS
RBC # BLD AUTO: 3.49 MILLION/UL (ref 3.81–5.12)
SODIUM SERPL-SCNC: 141 MMOL/L (ref 136–145)
T WAVE AXIS: 45 DEGREES
VENTRICULAR RATE: 78 BPM
WBC # BLD AUTO: 5.68 THOUSAND/UL (ref 4.31–10.16)

## 2020-03-04 PROCEDURE — 80048 BASIC METABOLIC PNL TOTAL CA: CPT | Performed by: FAMILY MEDICINE

## 2020-03-04 PROCEDURE — 85025 COMPLETE CBC W/AUTO DIFF WBC: CPT | Performed by: FAMILY MEDICINE

## 2020-03-04 PROCEDURE — 99217 PR OBSERVATION CARE DISCHARGE MANAGEMENT: CPT | Performed by: FAMILY MEDICINE

## 2020-03-04 PROCEDURE — 93010 ELECTROCARDIOGRAM REPORT: CPT | Performed by: INTERNAL MEDICINE

## 2020-03-04 RX ADMIN — Medication 100 MG: at 08:55

## 2020-03-04 RX ADMIN — POTASSIUM CHLORIDE 40 MEQ: 20 SOLUTION ORAL at 06:07

## 2020-03-04 RX ADMIN — METHYLPREDNISOLONE 12 MG: 4 TABLET ORAL at 08:55

## 2020-03-04 RX ADMIN — AMILORIDE HYDROCLORIDE 5 MG: 5 TABLET ORAL at 09:04

## 2020-03-04 RX ADMIN — TOPIRAMATE 100 MG: 100 TABLET, FILM COATED ORAL at 08:55

## 2020-03-04 RX ADMIN — SODIUM CHLORIDE, SODIUM GLUCONATE, SODIUM ACETATE, POTASSIUM CHLORIDE, MAGNESIUM CHLORIDE, SODIUM PHOSPHATE, DIBASIC, AND POTASSIUM PHOSPHATE 75 ML/HR: .53; .5; .37; .037; .03; .012; .00082 INJECTION, SOLUTION INTRAVENOUS at 04:19

## 2020-03-04 RX ADMIN — POTASSIUM CHLORIDE 20 MEQ: 14.9 INJECTION, SOLUTION INTRAVENOUS at 00:24

## 2020-03-04 RX ADMIN — SODIUM BICARBONATE 650 MG TABLET 1300 MG: at 08:55

## 2020-03-04 RX ADMIN — POTASSIUM CHLORIDE 20 MEQ: 14.9 INJECTION, SOLUTION INTRAVENOUS at 02:17

## 2020-03-04 RX ADMIN — ZINC SULFATE 220 MG (50 MG) CAPSULE 220 MG: CAPSULE at 08:55

## 2020-03-04 RX ADMIN — ONDANSETRON 4 MG: 2 INJECTION INTRAMUSCULAR; INTRAVENOUS at 08:56

## 2020-03-04 NOTE — ASSESSMENT & PLAN NOTE
Recurrent hypokalemia with multiple hospitalizations for this  Patient receives potassium infusions biweekly outpatient  Patient's acute on chronic hypokalemia as likely due to recent strep infection and reduced oral intake  - admitted for observation  - Potassium improved from 2 1 to 3 4 with KCl 100 mEq IV   - continue p o  KCl to 200 mEq daily as previously  - Continue biweekly potassium infusions as previously  - resume close follow-up with Nephrology

## 2020-03-04 NOTE — DISCHARGE SUMMARY
Discharge- Raymon Palm 1985, 38 y o  female MRN: [id number]    Unit/Bed#: 293-41 Encounter: [id number]    Primary Care Provider: David Ruvalcaba DO   Date and time admitted to hospital: 3/3/2020 10:12 AM        * Hypokalemia  Assessment & Plan  Recurrent hypokalemia with multiple hospitalizations for this  Patient receives potassium infusions biweekly outpatient  Patient's acute on chronic hypokalemia as likely due to recent strep infection and reduced oral intake  - admitted for observation  - Potassium improved from 2 1 to 3 4 with KCl 100 mEq IV   - continue p o  KCl to 200 mEq daily as previously  - Continue biweekly potassium infusions as previously  - resume close follow-up with Nephrology    History of gastric bypass  Assessment & Plan  With resultant chronic malabsorption  Continue vitamin replacement    Middle ear effusion  Assessment & Plan  Continue Medrol pack  Outpatient follow up with ENT      Discharging Physician / Practitioner: Misa Lugo MD  PCP: David Ruvalcaba DO  Admission Date:   Admission Orders (From admission, onward)     Ordered        03/03/20 1206  Place in Observation  Once                   Discharge Date: 03/04/20    Resolved Problems  Date Reviewed: 3/4/2020    None          Consultations During Hospital Stay:  · CM    Procedures Performed:   No    Significant Findings / Test Results:   Results from last 7 days   Lab Units 03/04/20  0515   WBC Thousand/uL 5 68   HEMOGLOBIN g/dL 10 4*   HEMATOCRIT % 32 0*   PLATELETS Thousands/uL 261     Results from last 7 days   Lab Units 03/04/20  0515   SODIUM mmol/L 141   POTASSIUM mmol/L 3 4*   CHLORIDE mmol/L 111*   CO2 mmol/L 19*   BUN mg/dL 15   CREATININE mg/dL 0 83   CALCIUM mg/dL 7 7*       Incidental Findings:   · None     Test Results Pending at Discharge (will require follow up):    · None     Outpatient Tests Requested:  · Close monitoring of BMP    Complications:  None    Reason for Admission: hypokalemia    Hospital Course: Regina Reese is a 38 y o  female patient with history of gastric bypass surgery and recurrent/chronic hypokalemia who originally presented to the hospital on 3/3/2020 due to hyperkalemia in setting of recent streptococcal pharyngitis  The patient was found to be markedly hypokalemic with potassium of 2 1  She was admitted for observation and received 100 mEq of IV potassium chloride  The patient's potassium improved to near normal and was at 3 4 at the time of the discharge  She was instructed to continue her oral potassium replacement at 200 mEq p o  Daily as well as making weekly infusions  Return precautions were discussed, the patient verbalized understanding  Please see above list of diagnoses and related plan for additional information  Condition at Discharge: good     Discharge Day Visit / Exam:     Subjective:  Patient seen and examined  She reports feeling well and would like to go home  She voices no complaints  No overnight events  Vitals: Blood Pressure: 101/53 (03/04/20 0721)  Pulse: 72 (03/04/20 0721)  Temperature: 97 9 °F (36 6 °C) (03/04/20 0721)  Temp Source: Oral (03/04/20 0721)  Respirations: 18 (03/04/20 0721)  Height: 5' 3" (160 cm) (03/03/20 1016)  Weight - Scale: 71 7 kg (158 lb 1 1 oz) (03/03/20 1016)  SpO2: 100 % (03/04/20 0721)    Exam:     Physical Exam   Constitutional: She is oriented to person, place, and time  No distress  HENT:   Head: Normocephalic and atraumatic  Eyes: Conjunctivae are normal    Neck: No JVD present  Cardiovascular: Normal rate and regular rhythm  No murmur heard  Pulmonary/Chest: Effort normal  No respiratory distress  She has no wheezes  She has no rales  Abdominal: Soft  She exhibits no distension  There is no tenderness  There is no guarding  Musculoskeletal: She exhibits no edema  Neurological: She is alert and oriented to person, place, and time  Skin: Skin is warm and dry     Psychiatric: She has a normal mood and affect  Discussion with Family: Patient    Discharge instructions/Information to patient and family:   See after visit summary for information provided to patient and family  Provisions for Follow-Up Care:  See after visit summary for information related to follow-up care and any pertinent home health orders  Disposition:     Home    For Discharges to [facility] SNF:   · Not Applicable to this Patient - Not Applicable to this Patient    Planned Readmission: No     Discharge Statement:  I spent 35 minutes discharging the patient  This time was spent on the day of discharge  I had direct contact with the patient on the day of discharge  Greater than 50% of the total time was spent examining patient, answering all patient questions, arranging and discussing plan of care with patient as well as directly providing post-discharge instructions  Additional time then spent on discharge activities  Discharge Medications:  See after visit summary for reconciled discharge medications provided to patient and family        ** Please Note: This note has been constructed using a voice recognition system **

## 2020-03-04 NOTE — NURSING NOTE
AVS printed and gone over with patient  Patient voiced understanding and had no questions at time of discharge  Patient left floor walking accompanied by RN in stable condition

## 2020-03-04 NOTE — PLAN OF CARE
Problem: PAIN - ADULT  Goal: Verbalizes/displays adequate comfort level or baseline comfort level  Description  Interventions:  - Encourage patient to monitor pain and request assistance  - Assess pain using appropriate pain scale  - Administer analgesics based on type and severity of pain and evaluate response  - Implement non-pharmacological measures as appropriate and evaluate response  - Consider cultural and social influences on pain and pain management  - Notify physician/advanced practitioner if interventions unsuccessful or patient reports new pain  Outcome: Progressing     Problem: INFECTION - ADULT  Goal: Absence or prevention of progression during hospitalization  Description  INTERVENTIONS:  - Assess and monitor for signs and symptoms of infection  - Monitor lab/diagnostic results  - Monitor all insertion sites, i e  indwelling lines, tubes, and drains  - Monitor endotracheal if appropriate and nasal secretions for changes in amount and color  - [place] appropriate cooling/warming therapies per order  - Administer medications as ordered  - Instruct and encourage patient and family to use good hand hygiene technique  - Identify and instruct in appropriate isolation precautions for identified infection/condition  Outcome: Progressing     Problem: SAFETY ADULT  Goal: Patient will remain free of falls  Description  INTERVENTIONS:  - Assess patient frequently for physical needs  -  Identify cognitive and physical deficits and behaviors that affect risk of falls    -  [place] fall precautions as indicated by assessment   - Educate patient/family on patient safety including physical limitations  - Instruct patient to call for assistance with activity based on assessment  - Modify environment to reduce risk of injury  - Consider OT/PT consult to assist with strengthening/mobility  Outcome: Progressing  Goal: Maintain or return to baseline ADL function  Description  INTERVENTIONS:  -  Assess patient's ability to carry out ADLs; assess patient's baseline for ADL function and identify physical deficits which impact ability to perform ADLs (bathing, care of mouth/teeth, toileting, grooming, dressing, etc )  - Assess/evaluate cause of self-care deficits   - Assess range of motion  - Assess patient's mobility; develop plan if impaired  - Assess patient's need for assistive devices and provide as appropriate  - Encourage maximum independence but intervene and supervise when necessary  - Involve family in performance of ADLs  - Assess for home care needs following discharge   - Consider OT consult to assist with ADL evaluation and planning for discharge  - Provide patient education as appropriate  Outcome: Progressing  Goal: Maintain or return mobility status to optimal level  Description  INTERVENTIONS:  - Assess patient's baseline mobility status (ambulation, transfers, stairs, etc )    - Identify cognitive and physical deficits and behaviors that affect mobility  - Identify mobility aids required to assist with transfers and/or ambulation (gait belt, sit-to-stand, lift, walker, cane, etc )  - [place] fall precautions as indicated by assessment  - Record patient progress and toleration of activity level on Mobility SBAR; progress patient to next Phase/Stage  - Instruct patient to call for assistance with activity based on assessment  - Consider rehabilitation consult to assist with strengthening/weightbearing, etc   Outcome: Progressing     Problem: DISCHARGE PLANNING  Goal: Discharge to home or other facility with appropriate resources  Description  INTERVENTIONS:  - Identify barriers to discharge w/patient and caregiver  - Arrange for needed discharge resources and transportation as appropriate  - Identify discharge learning needs (meds, wound care, etc )  - Arrange for interpretive services to assist at discharge as needed  - Refer to Case Management Department for coordinating discharge planning if the patient needs post-hospital services based on physician/advanced practitioner order or complex needs related to functional status, cognitive ability, or social support system  Outcome: Progressing     Problem: Knowledge Deficit  Goal: Patient/family/caregiver demonstrates understanding of disease process, treatment plan, medications, and discharge instructions  Description  Complete learning assessment and assess knowledge base    Interventions:  - Provide teaching at level of understanding  - Provide teaching via preferred learning methods  Outcome: Progressing

## 2020-03-04 NOTE — SOCIAL WORK
Discussed with patient preferences on discharge;understanding how to manage health at home; purpose of taking medications; importance of follow up care/appointments; and symptoms to watch out for once discharged home  Pt is being dc'd home on this date and will be going to her OP infusion appointment tomorrow at 9 am

## 2020-03-04 NOTE — UTILIZATION REVIEW
Initial Clinical Review    Admission: Date/Time/Statement: Admission Orders (From admission, onward)     Ordered        03/03/20 1206  Place in Observation  Once                   Orders Placed This Encounter   Procedures    Place in Observation     Standing Status:   Standing     Number of Occurrences:   1     Order Specific Question:   Admitting Physician     Answer:   Manny Fry [[id number]]     Order Specific Question:   Level of Care     Answer:   Med Surg [16]     ED Arrival Information     Expected Arrival Acuity Means of Arrival Escorted By Service Admission Type    - 3/3/2020 10:12 Urgent Walk-In Other (Comment) General Medicine Urgent    Arrival Complaint    abnormal lab        Chief Complaint   Patient presents with    Low Potassium     WAs in infusion for [place] replacement  K+ was reported at 2 2  Dr Gabriel gusman patient seen in ED  Assessment/Plan:   38year old female presents to the ED from outpatient infusion center for evaluation of low potassium  Pt has long standing history of hypokalemia  She has a significant history of hypokalemia of unknown etiology  She has had extensive work up at [facility] per pt report  She now follows with nephrology and gets biweekly infusions  She presented to outpatient infusion and was subsequently referred to ED secondary to K of 2 2    Hypokalemia  Assessment & Plan  Recurrent hypokalemia with multiple hospitalizations for this  Patient receives potassium infusions biweekly outpatient  Patient's acute on chronic hypokalemia as likely due to recent strep infection and reduced oral intake  - admit for observation  - proceed with IV potassium replacement - the patient with history of malabsorption likely due to gastric bypass status  - continue p o  KCl to 200 mEq daily  - BMP in a m    ED Triage Vitals [03/03/20 1016]   Temperature Pulse Respirations Blood Pressure SpO2   (!) 97 3 °F (36 3 °C) 93 18 131/63 100 %      Temp Source Heart Rate Source Patient Position - Orthostatic VS BP Location FiO2 (%)   Temporal Monitor Sitting Right arm --      Pain Score       No Pain        Wt Readings from Last 1 Encounters:   03/03/20 71 7 kg (158 lb 1 1 oz)     Additional Vital Signs:   Date/Time  Temp  Pulse  Resp  BP  MAP (mmHg)  SpO2  O2 Device  Patient Position - Orthostatic VS   03/04/20 07:21:05  97 9 °F (36 6 °C)  72  18  101/53  69  100 %  None (Room air)  Sitting   03/03/20 22:23:20  97 8 °F (36 6 °C)  85    105/64  78  99 %       03/03/20 15:12:08    50Abnormal   18  107/63  78  97 %       03/03/20 12:55:22  97 9 °F (36 6 °C)  74  18  107/61  76  100 %       03/03/20 1121    77  12  115/55    100 %  None (Room air)  Lying   03/03/20 1016  97 3 °F (36 3 °C)Abnormal   93  18  131/63    100 %  None (Room air)  Sitting   Pertinent Labs/Diagnostic Test Results:   Results from last 7 days   Lab Units 03/04/20  0515 03/03/20  1119 02/27/20  0631   WBC Thousand/uL 5 68 7 51 4 39   HEMOGLOBIN g/dL 10 4* 12 3 11 0*   HEMATOCRIT % 32 0* 37 0 35 0   PLATELETS Thousands/uL 261 317 245   NEUTROS ABS Thousands/µL 2 26 4 15  --      Results from last 7 days   Lab Units 03/04/20  0515 03/03/20  1119 03/03/20  0917 02/27/20  0631 02/26/20  1429   SODIUM mmol/L 141 141 137 140 142   POTASSIUM mmol/L 3 4* 2 1* 2 2* 4 9 3 4*   CHLORIDE mmol/L 111* 107 103 115* 114*   CO2 mmol/L 19* 18* 19* 14* 14*   ANION GAP mmol/L 11 16* 15* 11 14*   BUN mg/dL 15 11 12 7 9   CREATININE mg/dL 0 83 0 92 0 95 0 79 0 75   EGFR ml/min/1 73sq m 91 80 77 97 103   CALCIUM mg/dL 7 7* 7 9* 8 4 7 7* 7 6*   MAGNESIUM mg/dL  --  2 0  --  1 9  --    PHOSPHORUS mg/dL  --   --   --  1 9*  --      Results from last 7 days   Lab Units 03/03/20  1119   AST U/L 16   ALT U/L 24   ALK PHOS U/L 46   TOTAL PROTEIN g/dL 7 0   ALBUMIN g/dL 3 7   TOTAL BILIRUBIN mg/dL 0 30     Results from last 7 days   Lab Units 03/04/20  0515 03/03/20  1119 03/03/20  0917 02/27/20  0631 02/26/20  1429   GLUCOSE RANDOM mg/dL 76 79 82 102 91     Results from last 7 days   Lab Units 02/27/20  0631   PH ALYCE  7 189*   PCO2 ALYCE mm Hg 33 7*   PO2 ALYCE mm Hg 71 1*   HCO3 ALYCE mmol/L 12 6*   BASE EXC ALYCE mmol/L -14 6   O2 CONTENT ALYCE ml/dL 15 2   O2 HGB, VENOUS % 91 9*     3/3  ekg=Normal sinus rhythm  Nonspecific T wave abnormality    ED Treatment:   Medication Administration from 03/03/2020 1006 to 03/03/2020 1252       Date/Time Order Dose Route Action     03/03/2020 1221 potassium chloride 20 mEq IVPB (premix) 20 mEq Intravenous New Bag        Past Medical History:   Diagnosis Date    H  pylori infection     Hypokalemia     Migraine     Renal disorder     Rhabdomyolysis      Present on Admission:   Hypokalemia  Admitting Diagnosis: Hypokalemia [E87 6]  Abnormal laboratory test result [R89 9]  Age/Sex: 38 y o  female  Admission Orders:  Telemetry  Scheduled Medications:  AMILoride 5 mg Oral Daily   amitriptyline 50 mg Oral HS   [START ON 3/5/2020] ferrous sulfate 325 mg Oral Every Other Day   meclizine 25 mg Oral HS   [START ON 3/5/2020] methylprednisolone 8 mg Oral Daily With Breakfast   Followed by      Kaia Ngo ON 3/6/2020] methylprednisolone 4 mg Oral Daily With Breakfast   multivitamin-minerals 1 tablet Oral Daily With Dinner   potassium chloride 40 mEq Oral 5x Daily   sodium bicarbonate 1,300 mg Oral BID   thiamine 100 mg Oral Daily   topiramate 100 mg Oral BID   zinc sulfate 220 mg Oral Daily     Continuous IV Infusions:  multi-electrolyte 75 mL/hr Intravenous Continuous     PRN Meds:  acetaminophen 650 mg Oral Q6H PRN   ondansetron 4 mg Intravenous Q6H PRN   SUMAtriptan 100 mg Oral Once PRN     Network Utilization Review Department  [email or web address]  ATTENTION: Please call with any questions or concerns to [phone] and carefully listen to the prompts so that you are directed to the right person   All voicemails are confidential   Andrew Dominguez all requests for admission clinical reviews, approved or denied determinations and any other requests to dedicated fax number below belonging to the campus where the patient is receiving treatment   List of dedicated fax numbers for the Facilities:  [address] [place] [address] DENIALS (Administrative/Medical Necessity) [phone]   [address]  (Maternity/NICU/Pediatrics) [phone]   Mario Rodríguez [phone]   Lisa Wright [phone]   Sravani Roman [phone]   [address] [facility]  [phone]   [address] [place] [address] [phone]   [facility]  [phone]   [address] [facility]  [address] [facility] [address] [facility] [phone]

## 2020-03-04 NOTE — SOCIAL WORK
Cm met with the patient to evaluate the patients prior function and living situation and any barriers to d/c and form a safe d/c plan  Cm also evaluated the patient for any services in the home or needs for services  Pt resides at home with her spouse and children in a single story house  0 [address]  Pt is independent with her adls and ambulation  No DME  Pt goes to outpatient infusions 2x's a week for Potassium infusions  Pt and spouse both drive  Pharmacy is [facility] in [facility] and PCP is Zari Urbano  Pt plans home on dc with resumption of OP infusions and states she is in the process of trying to get in to [facility] clinic (Mindy from [facility] come up on certain days) for their dilemma clinic to help with her potassium issues

## 2020-03-05 ENCOUNTER — TELEPHONE (OUTPATIENT)
Dept: NEPHROLOGY | Facility: CLINIC | Age: 35
End: 2020-03-05

## 2020-03-05 ENCOUNTER — HOSPITAL ENCOUNTER (OUTPATIENT)
Dept: INFUSION CENTER | Facility: HOSPITAL | Age: 35
Discharge: HOME | End: 2020-03-05
Payer: COMMERCIAL

## 2020-03-05 VITALS
HEART RATE: 78 BPM | RESPIRATION RATE: 18 BRPM | SYSTOLIC BLOOD PRESSURE: 118 MMHG | DIASTOLIC BLOOD PRESSURE: 56 MMHG | OXYGEN SATURATION: 100 % | TEMPERATURE: 98.3 F

## 2020-03-05 DIAGNOSIS — K90.89 OTHER INTESTINAL MALABSORPTION: Primary | ICD-10-CM

## 2020-03-05 DIAGNOSIS — E87.6 HYPOKALEMIA: ICD-10-CM

## 2020-03-05 LAB
ANION GAP SERPL CALCULATED.3IONS-SCNC: 12 MMOL/L (ref 4–13)
BUN SERPL-MCNC: 11 MG/DL (ref 5–25)
CALCIUM SERPL-MCNC: 8.1 MG/DL (ref 8.3–10.1)
CHLORIDE SERPL-SCNC: 107 MMOL/L (ref 100–108)
CO2 SERPL-SCNC: 20 MMOL/L (ref 21–32)
CREAT SERPL-MCNC: 0.85 MG/DL (ref 0.6–1.3)
GFR SERPL CREATININE-BSD FRML MDRD: 88 ML/MIN/1.73SQ M
GLUCOSE SERPL-MCNC: 85 MG/DL (ref 65–140)
POTASSIUM SERPL-SCNC: 2.8 MMOL/L (ref 3.5–5.3)
SODIUM SERPL-SCNC: 139 MMOL/L (ref 136–145)

## 2020-03-05 PROCEDURE — 80048 BASIC METABOLIC PNL TOTAL CA: CPT | Performed by: INTERNAL MEDICINE

## 2020-03-05 PROCEDURE — 96365 THER/PROPH/DIAG IV INF INIT: CPT

## 2020-03-05 PROCEDURE — 96366 THER/PROPH/DIAG IV INF ADDON: CPT

## 2020-03-05 RX ORDER — POTASSIUM CHLORIDE 29.8 MG/ML
40 INJECTION INTRAVENOUS ONCE
Status: CANCELLED | OUTPATIENT
Start: 2020-03-05

## 2020-03-05 RX ORDER — POTASSIUM CHLORIDE 29.8 MG/ML
40 INJECTION INTRAVENOUS ONCE
Status: DISCONTINUED | OUTPATIENT
Start: 2020-03-05 | End: 2020-03-05

## 2020-03-05 RX ORDER — SODIUM CHLORIDE 9 MG/ML
20 INJECTION, SOLUTION INTRAVENOUS ONCE
Status: CANCELLED
Start: 2020-03-09

## 2020-03-05 RX ORDER — SODIUM CHLORIDE 9 MG/ML
20 INJECTION, SOLUTION INTRAVENOUS ONCE
Status: COMPLETED | OUTPATIENT
Start: 2020-03-05 | End: 2020-03-05

## 2020-03-05 RX ORDER — POTASSIUM CHLORIDE 29.8 MG/ML
40 INJECTION INTRAVENOUS ONCE
Status: CANCELLED | OUTPATIENT
Start: 2020-03-06

## 2020-03-05 RX ADMIN — POTASSIUM CHLORIDE: 2 INJECTION, SOLUTION, CONCENTRATE INTRAVENOUS at 10:17

## 2020-03-05 RX ADMIN — SODIUM CHLORIDE 20 ML/HR: 0.9 INJECTION, SOLUTION INTRAVENOUS at 10:15

## 2020-03-05 NOTE — TELEPHONE ENCOUNTER
Spoke with patient she is still getting her infusions twice a week  She was there when I called her

## 2020-03-05 NOTE — PROGRESS NOTES
Pt arrived on unit at [address] [place] [address]  Stated bilateral hands are falling asleep  Labs drawn via port  Awaiting results

## 2020-03-05 NOTE — PROGRESS NOTES
Pt tolerated treatment well  Pt needed to leave to  children  Discharged in satisfactory condition  Next appointment 3/9/2020

## 2020-03-05 NOTE — TELEPHONE ENCOUNTER
----- Message from Fredericka Seip, MD sent at 3/5/2020 10:58 AM EST -----  Is she getting her KCl infusions twice a week?

## 2020-03-09 ENCOUNTER — HOSPITAL ENCOUNTER (OUTPATIENT)
Dept: INFUSION CENTER | Facility: HOSPITAL | Age: 35
Discharge: HOME | End: 2020-03-09
Payer: COMMERCIAL

## 2020-03-09 ENCOUNTER — HOSPITAL ENCOUNTER (INPATIENT)
Facility: HOSPITAL | Age: 35
LOS: 3 days | Discharge: HOME | DRG: 641 | End: 2020-03-12
Attending: INTERNAL MEDICINE | Admitting: INTERNAL MEDICINE
Payer: COMMERCIAL

## 2020-03-09 VITALS
DIASTOLIC BLOOD PRESSURE: 60 MMHG | SYSTOLIC BLOOD PRESSURE: 120 MMHG | HEART RATE: 90 BPM | TEMPERATURE: 99 F | OXYGEN SATURATION: 100 % | RESPIRATION RATE: 16 BRPM

## 2020-03-09 DIAGNOSIS — E87.6 HYPOKALEMIA: Primary | ICD-10-CM

## 2020-03-09 DIAGNOSIS — K90.89 OTHER INTESTINAL MALABSORPTION: ICD-10-CM

## 2020-03-09 DIAGNOSIS — E87.2 NORMAL ANION GAP METABOLIC ACIDOSIS: ICD-10-CM

## 2020-03-09 LAB
ANION GAP SERPL CALCULATED.3IONS-SCNC: 14 MMOL/L (ref 4–13)
BUN SERPL-MCNC: 11 MG/DL (ref 5–25)
CALCIUM SERPL-MCNC: 8.3 MG/DL (ref 8.3–10.1)
CHLORIDE SERPL-SCNC: 103 MMOL/L (ref 100–108)
CO2 SERPL-SCNC: 20 MMOL/L (ref 21–32)
CREAT SERPL-MCNC: 1.05 MG/DL (ref 0.6–1.3)
GFR SERPL CREATININE-BSD FRML MDRD: 68 ML/MIN/1.73SQ M
GLUCOSE SERPL-MCNC: 81 MG/DL (ref 65–140)
POTASSIUM SERPL-SCNC: 2.4 MMOL/L (ref 3.5–5.3)
SODIUM SERPL-SCNC: 137 MMOL/L (ref 136–145)

## 2020-03-09 PROCEDURE — 80048 BASIC METABOLIC PNL TOTAL CA: CPT | Performed by: INTERNAL MEDICINE

## 2020-03-09 PROCEDURE — 99219 PR INITIAL OBSERVATION CARE/DAY 50 MINUTES: CPT | Performed by: FAMILY MEDICINE

## 2020-03-09 RX ORDER — ZINC SULFATE 50(220)MG
220 CAPSULE ORAL DAILY
Status: DISCONTINUED | OUTPATIENT
Start: 2020-03-10 | End: 2020-03-12 | Stop reason: HOSPADM

## 2020-03-09 RX ORDER — SUMATRIPTAN 25 MG/1
100 TABLET, FILM COATED ORAL ONCE AS NEEDED
Status: DISCONTINUED | OUTPATIENT
Start: 2020-03-09 | End: 2020-03-12 | Stop reason: HOSPADM

## 2020-03-09 RX ORDER — THIAMINE MONONITRATE (VIT B1) 100 MG
100 TABLET ORAL DAILY
Status: DISCONTINUED | OUTPATIENT
Start: 2020-03-10 | End: 2020-03-12 | Stop reason: HOSPADM

## 2020-03-09 RX ORDER — ACETAMINOPHEN 325 MG/1
650 TABLET ORAL EVERY 6 HOURS PRN
Status: DISCONTINUED | OUTPATIENT
Start: 2020-03-09 | End: 2020-03-10

## 2020-03-09 RX ORDER — SODIUM CHLORIDE 9 MG/ML
20 INJECTION, SOLUTION INTRAVENOUS ONCE
Status: CANCELLED
Start: 2020-03-12

## 2020-03-09 RX ORDER — SODIUM CHLORIDE, SODIUM GLUCONATE, SODIUM ACETATE, POTASSIUM CHLORIDE, MAGNESIUM CHLORIDE, SODIUM PHOSPHATE, DIBASIC, AND POTASSIUM PHOSPHATE .53; .5; .37; .037; .03; .012; .00082 G/100ML; G/100ML; G/100ML; G/100ML; G/100ML; G/100ML; G/100ML
75 INJECTION, SOLUTION INTRAVENOUS CONTINUOUS
Status: DISCONTINUED | OUTPATIENT
Start: 2020-03-09 | End: 2020-03-12

## 2020-03-09 RX ORDER — MECLIZINE HYDROCHLORIDE 25 MG/1
25 TABLET ORAL
Status: DISCONTINUED | OUTPATIENT
Start: 2020-03-09 | End: 2020-03-12 | Stop reason: HOSPADM

## 2020-03-09 RX ORDER — POTASSIUM CHLORIDE 14.9 MG/ML
20 INJECTION INTRAVENOUS
Status: COMPLETED | OUTPATIENT
Start: 2020-03-09 | End: 2020-03-10

## 2020-03-09 RX ORDER — AMILORIDE HYDROCHLORIDE 5 MG/1
5 TABLET ORAL DAILY
Status: DISCONTINUED | OUTPATIENT
Start: 2020-03-10 | End: 2020-03-11

## 2020-03-09 RX ORDER — ONDANSETRON 2 MG/ML
4 INJECTION INTRAMUSCULAR; INTRAVENOUS EVERY 6 HOURS PRN
Status: DISCONTINUED | OUTPATIENT
Start: 2020-03-09 | End: 2020-03-12 | Stop reason: HOSPADM

## 2020-03-09 RX ORDER — TOPIRAMATE 100 MG/1
100 TABLET, FILM COATED ORAL 2 TIMES DAILY
Status: DISCONTINUED | OUTPATIENT
Start: 2020-03-09 | End: 2020-03-12 | Stop reason: HOSPADM

## 2020-03-09 RX ORDER — POTASSIUM CHLORIDE 20MEQ/15ML
40 LIQUID (ML) ORAL
Status: DISCONTINUED | OUTPATIENT
Start: 2020-03-09 | End: 2020-03-11

## 2020-03-09 RX ORDER — AMITRIPTYLINE HYDROCHLORIDE 50 MG/1
50 TABLET, FILM COATED ORAL
Status: DISCONTINUED | OUTPATIENT
Start: 2020-03-09 | End: 2020-03-12 | Stop reason: HOSPADM

## 2020-03-09 RX ADMIN — SODIUM CHLORIDE, SODIUM GLUCONATE, SODIUM ACETATE, POTASSIUM CHLORIDE AND MAGNESIUM CHLORIDE 75 ML/HR: 526; 502; 368; 37; 30 INJECTION, SOLUTION INTRAVENOUS at 21:22

## 2020-03-09 RX ADMIN — POTASSIUM CHLORIDE 40 MEQ: 20 SOLUTION ORAL at 21:19

## 2020-03-09 RX ADMIN — POTASSIUM CHLORIDE 20 MEQ: 14.9 INJECTION, SOLUTION INTRAVENOUS at 14:44

## 2020-03-09 RX ADMIN — POTASSIUM CHLORIDE 20 MEQ: 14.9 INJECTION, SOLUTION INTRAVENOUS at 16:39

## 2020-03-09 RX ADMIN — MECLIZINE HYDROCHLORIDE 25 MG: 25 TABLET ORAL at 21:19

## 2020-03-09 RX ADMIN — POTASSIUM CHLORIDE 20 MEQ: 14.9 INJECTION, SOLUTION INTRAVENOUS at 18:29

## 2020-03-09 RX ADMIN — SODIUM CHLORIDE, SODIUM GLUCONATE, SODIUM ACETATE, POTASSIUM CHLORIDE AND MAGNESIUM CHLORIDE 75 ML/HR: 526; 502; 368; 37; 30 INJECTION, SOLUTION INTRAVENOUS at 12:43

## 2020-03-09 RX ADMIN — POTASSIUM CHLORIDE 20 MEQ: 14.9 INJECTION, SOLUTION INTRAVENOUS at 12:38

## 2020-03-09 RX ADMIN — POTASSIUM CHLORIDE 40 MEQ: 20 SOLUTION ORAL at 18:28

## 2020-03-09 RX ADMIN — POTASSIUM CHLORIDE 40 MEQ: 20 SOLUTION ORAL at 14:44

## 2020-03-09 RX ADMIN — POTASSIUM CHLORIDE 20 MEQ: 14.9 INJECTION, SOLUTION INTRAVENOUS at 20:53

## 2020-03-09 RX ADMIN — AMITRIPTYLINE HYDROCHLORIDE 50 MG: 50 TABLET, FILM COATED ORAL at 21:19

## 2020-03-09 RX ADMIN — TOPIRAMATE 100 MG: 100 TABLET, FILM COATED ORAL at 18:28

## 2020-03-09 NOTE — ASSESSMENT & PLAN NOTE
With chronic malabsorption with recurrent hospital admissions for chronic hypokalemia   Continue replacement therapy

## 2020-03-09 NOTE — PLAN OF CARE
Problem: INFECTION - ADULT  Goal: Absence or prevention of progression during hospitalization  Description  INTERVENTIONS:  - Assess and monitor for signs and symptoms of infection  - Monitor lab/diagnostic results  - Monitor all insertion sites, i e  indwelling lines, tubes, and drains  - Administer medications as ordered  - Instruct and encourage patient and family to use good hand hygiene technique  - Identify and instruct in appropriate isolation precautions for identified infection/condition   Outcome: Progressing     Problem: SAFETY ADULT  Goal: Patient will remain free of falls  Description  INTERVENTIONS:  - Assess patient frequently for physical needs  -  Identify cognitive and physical deficits and behaviors that affect risk of falls    -  [place] fall precautions as indicated by assessment   - Educate patient/family on patient safety including physical limitations  - Instruct patient to call for assistance with activity based on assessment  - Modify environment to reduce risk of injury  - Consider OT/PT consult to assist with strengthening/mobility  Outcome: Progressing  Goal: Maintain or return to baseline ADL function  Description  INTERVENTIONS:  -  Assess patient's ability to carry out ADLs; assess patient's baseline for ADL function and identify physical deficits which impact ability to perform ADLs (bathing, care of mouth/teeth, toileting, grooming, dressing, etc )  - Assess/evaluate cause of self-care deficits   - Assess range of motion  - Assess patient's mobility; develop plan if impaired  - Assess patient's need for assistive devices and provide as appropriate  - Encourage maximum independence but intervene and supervise when necessary  - Involve family in performance of ADLs  - Assess for home care needs following discharge   - Consider OT consult to assist with ADL evaluation and planning for discharge  - Provide patient education as appropriate  Outcome: Progressing  Goal: Maintain or return mobility status to optimal level  Description  INTERVENTIONS:  - Assess patient's baseline mobility status (ambulation, transfers, stairs, etc )    - Identify cognitive and physical deficits and behaviors that affect mobility  - Identify mobility aids required to assist with transfers and/or ambulation (gait belt, sit-to-stand, lift, walker, cane, etc )  - [place] fall precautions as indicated by assessment  - Record patient progress and toleration of activity level on Mobility SBAR; progress patient to next Phase/Stage  - Instruct patient to call for assistance with activity based on assessment  - Consider rehabilitation consult to assist with strengthening/weightbearing, etc   Outcome: Progressing     Problem: DISCHARGE PLANNING  Goal: Discharge to home or other facility with appropriate resources  Description  INTERVENTIONS:  - Identify barriers to discharge w/patient and caregiver  - Arrange for needed discharge resources and transportation as appropriate  - Identify discharge learning needs (meds, wound care, etc )  - Refer to Case Management Department for coordinating discharge planning if the patient needs post-hospital services based on physician/advanced practitioner order or complex needs related to functional status, cognitive ability, or social support system   Outcome: Progressing     Problem: Knowledge Deficit  Goal: Patient/family/caregiver demonstrates understanding of disease process, treatment plan, medications, and discharge instructions  Description  Complete learning assessment and assess knowledge base    Interventions:  - Provide teaching at level of understanding  - Provide teaching via preferred learning methods  Outcome: Progressing     Problem: METABOLIC, FLUID AND ELECTROLYTES - ADULT  Goal: Electrolytes maintained within normal limits  Description  INTERVENTIONS:  - Monitor labs and assess patient for signs and symptoms of electrolyte imbalances  - Administer electrolyte replacement as ordered  - Monitor response to electrolyte replacements, including repeat lab results as appropriate  - Instruct patient on fluid and nutrition as appropriate  Outcome: Progressing

## 2020-03-09 NOTE — H&P
H&P- Malu Bazzi 1985, 38 y o  female MRN: [id number]    Unit/Bed#: 403-01 Encounter: [id number]    Primary Care Provider: Luma Serrano DO   Date and time admitted to hospital: 3/9/2020 11:16 AM        * Hypokalemia  Assessment & Plan  Patient presenting with symptomatic hypokalemia reporting generalized paresthesias  She receives IV potassium infusion twice weekly outpatient and is on 100 meq daily potassium replacement  - The patient was sent from infusion center with K at 2 4  Unclear reason for the acute drop given very recent hospitalization and reportedly no recent illness or decrease in oral intake   - Admit for observation  - Continue home regimen 100 meq PO daily  - IV KCl 20 meq x 5  - Proceed with isolyte at 75meq/hr  - BMP in am    History of gastric bypass  Assessment & Plan  With chronic malabsorption with recurrent hospital admissions for chronic hypokalemia   Continue replacement therapy    Other intestinal malabsorption  Assessment & Plan  Due to gastric bypass status  Patient with recurrent hospitalizations with hypokalemia     Chronic anemia  Assessment & Plan  Due to malabsorption/gastric bypass status  Hb at baseline  Monitor CBC     VTE Prophylaxis: Low, no risk   / sequential compression device   Code Status: Full  POLST: POLST form is not discussed and not completed at this time  Discussion with family: Patient    Anticipated Length of Stay:  Patient will be admitted on an Observation basis with an anticipated length of stay of less than 2 midnights  Justification for Hospital Stay: IV KCl replacement    Total Time for Visit, including Counseling / Coordination of Care: 1 hour  Greater than 50% of this total time spent on direct patient counseling and coordination of care      Chief Complaint:   paresthesias    History of Present Illness:    Malu Bazzi is a 38 y o  female with past history of obesity status post gastric bypass surgery and chronic malabsorption frequent hospitalizations with acute on chronic hypokalemia who presents with a 2 day history of generalized paresthesias  Patient is seen at the infusion center twice weekly for IV potassium infusions and was noted potassium level 2 4 recommended present to the hospital for inpatient admission and IV potassium replacement  The patient reports generalized paresthesias since her entire body vibrating  She denies chest pain or palpitations  She reports normal ambulation and denies weakness her extremities  She denies chills or fevers  She reports compliance with her medications  She denies changes in her diet or oral intake  Review of Systems:    Review of Systems   Constitutional: Negative for chills, diaphoresis and fatigue  HENT: Negative for congestion and rhinorrhea  Eyes: Negative for visual disturbance  Respiratory: Negative for shortness of breath  Cardiovascular: Negative for chest pain, palpitations and leg swelling  Gastrointestinal: Negative for abdominal distention, abdominal pain, constipation, diarrhea and nausea  Endocrine: Negative for polydipsia and polyuria  Genitourinary: Negative for dysuria  Musculoskeletal: Negative for gait problem  Neurological: Negative for weakness  Paresthesias    Hematological: Negative for adenopathy  Psychiatric/Behavioral: Negative for agitation  Past Medical and Surgical History:     Past Medical History:   Diagnosis Date    H  pylori infection     Hypokalemia     Migraine     Renal disorder     Rhabdomyolysis        Past Surgical History:   Procedure Laterality Date    ABDOMINAL SURGERY      APPENDECTOMY       SECTION      CHOLECYSTECTOMY      COSMETIC SURGERY      FL GUIDED CENTRAL VENOUS ACCESS DEVICE INSERTION  2018    GASTRIC BYPASS      HYSTERECTOMY      TUNNELED VENOUS PORT PLACEMENT N/A 2018    Procedure: INSERTION VENOUS PORT (PORT-A-CATH);   Surgeon: Marah Lunsford DO;  Location: [facility]; Service: General       Meds/Allergies:    Prior to Admission medications    Medication Sig Start Date End Date Taking? Authorizing Provider   AMILoride 5 mg tablet Take 1 tablet (5 mg total) by mouth daily 2/20/19  Yes Ubaldo Carter DO   amitriptyline (ELAVIL) 50 mg tablet Take 50 mg by mouth daily at bedtime 8/27/18 3/9/20 Yes Historical Provider, MD mathew complex-C-folic acid (NEPHRO-EDIE) 0 8 mg tablet Take 0 8 mg by mouth daily with dinner   Yes Historical Provider, MD   ergocalciferol (ERGOCALCIFEROL) 12896 units capsule Take 50,000 Units by mouth once a week Patient takes this med on Mondays    Yes Historical Provider, MD   ferrous sulfate 325 (65 Fe) mg tablet Take 325 mg by mouth every other day Last took 3/9/20   Yes Historical Provider, MD   meclizine (ANTIVERT) 25 mg tablet Take 25 mg by mouth daily at bedtime 8/27/18  Yes Historical Provider, MD   potassium chloride 10 % oral solution Take 30 mL (40 mEq total) by mouth 5 (five) times a day 10/2/19 3/9/20 Yes ARIADNA Hodgson   SUMAtriptan (IMITREX) 100 mg tablet Take 100 mg by mouth once as needed for migraine    Yes Historical Provider, MD   thiamine 100 MG tablet Take 100 mg by mouth daily   Yes Historical Provider, MD   topiramate (TOPAMAX) 100 mg tablet Take 100 mg by mouth 2 (two) times a day   Yes Historical Provider, MD   zinc sulfate (ZINCATE) 220 mg capsule Take 1 capsule (220 mg total) by mouth daily 8/9/19  Yes Mishel Victoria MD   methylPREDNISolone 4 MG tablet therapy pack Use as directed on package  Patient not taking: Reported on 3/9/2020 3/1/20   Damaris Marti MD   ondansetron [facility]) 4 mg tablet Take 1 tablet (4 mg total) by mouth every 8 (eight) hours as needed for nausea or vomiting  Patient not taking: Reported on 3/9/2020 3/1/20   Damaris Marti MD     I have reviewed home medications with a medical source (PCP, Pharmacy, other)  Allergies:    Allergies   Allergen Reactions    Nsaids Other (See Comments)     Other reaction(s): Other (Please comment)  Should not take s/p bariatric surgery  Other reaction(s): Other (See Comments)  Should not take as per prior records  Should not take s/p bariatric surgery  Has hx of gastric bypass      Prednisone      Nausea, vomiting, diarrhea       Social History:     Marital Status: /Civil Union   Occupation: not addressed  Patient Pre-hospital Living Situation: family  Patient Pre-hospital Level of Mobility: independent  Patient Pre-hospital Diet Restrictions: none   Substance Use History:   Social History     Substance and Sexual Activity   Alcohol Use Never    Alcohol/week: 0 0 standard drinks    Frequency: Never    Drinks per session: Patient refused    Binge frequency: Never    Comment: 0     Social History     Tobacco Use   Smoking Status Never Smoker   Smokeless Tobacco Never Used     Social History     Substance and Sexual Activity   Drug Use No       Family History:    non-contributory    Physical Exam:     Vitals:   Blood Pressure: 109/76 (03/09/20 1530)  Pulse: 71 (03/09/20 1530)  Temperature: 98 2 °F (36 8 °C) (03/09/20 1530)  Temp Source: Oral (03/09/20 1120)  Respirations: 16 (03/09/20 1530)  Height: 5' 3" (160 cm) (03/09/20 1120)  Weight - Scale: 69 8 kg (153 lb 14 1 oz) (03/09/20 1120)  SpO2: 100 % (03/09/20 1530)    Physical Exam   Constitutional: She is oriented to person, place, and time  No distress  HENT:   Head: Normocephalic and atraumatic  Eyes: Conjunctivae are normal    Neck: No JVD present  Cardiovascular: Normal rate and regular rhythm  No murmur heard  Pulmonary/Chest: Effort normal  No respiratory distress  She has no wheezes  She has no rales  Abdominal: Soft  She exhibits no distension  There is no tenderness  There is no guarding  Musculoskeletal: She exhibits no edema  Neurological: She is alert and oriented to person, place, and time  Skin: Skin is warm and dry  Psychiatric: She has a normal mood and affect         Additional Data:     Lab Results: I have personally reviewed pertinent reports  Results from last 7 days   Lab Units 03/04/20  0515   WBC Thousand/uL 5 68   HEMOGLOBIN g/dL 10 4*   HEMATOCRIT % 32 0*   PLATELETS Thousands/uL 261   NEUTROS PCT % 40*   LYMPHS PCT % 49*   MONOS PCT % 7   EOS PCT % 3     Results from last 7 days   Lab Units 03/09/20  0912  03/03/20  1119   SODIUM mmol/L 137   < > 141   POTASSIUM mmol/L 2 4*   < > 2 1*   CHLORIDE mmol/L 103   < > 107   CO2 mmol/L 20*   < > 18*   BUN mg/dL 11   < > 11   CREATININE mg/dL 1 05   < > 0 92   ANION GAP mmol/L 14*   < > 16*   CALCIUM mg/dL 8 3   < > 7 9*   ALBUMIN g/dL  --   --  3 7   TOTAL BILIRUBIN mg/dL  --   --  0 30   ALK PHOS U/L  --   --  46   ALT U/L  --   --  24   AST U/L  --   --  16   GLUCOSE RANDOM mg/dL 81   < > 79    < > = values in this interval not displayed  Imaging: I have personally reviewed pertinent reports  No orders to display       EKG, Pathology, and Other Studies Reviewed on Admission:   · EKG: ordered    [employer] / Epic Records Reviewed: Yes     ** Please Note: This note has been constructed using a voice recognition system   **

## 2020-03-09 NOTE — PROGRESS NOTES
Critical lab results of potassium 2 4 reported to Dr Hernando Dennison and he ordered patient a john admit to hospital instructed patient on same and she verbalized agreement to same

## 2020-03-09 NOTE — ASSESSMENT & PLAN NOTE
Patient presenting with symptomatic hypokalemia reporting generalized paresthesias  She receives IV potassium infusion twice weekly outpatient and is on 100 meq daily potassium replacement  - The patient was sent from infusion center with K at 2 4   Unclear reason for the acute drop given very recent hospitalization and reportedly no recent illness or decrease in oral intake   - Admit for observation  - Continue home regimen 100 meq PO daily  - IV KCl 20 meq x 5  - Proceed with isolyte at 75meq/hr  - BMP in am

## 2020-03-09 NOTE — PLAN OF CARE
Problem: INFECTION - ADULT  Goal: Absence or prevention of progression during hospitalization  Description  INTERVENTIONS:  - Assess and monitor for signs and symptoms of infection  - Monitor lab/diagnostic results  - Monitor all insertion sites, i e  indwelling lines, tubes, and drains  - Monitor endotracheal if appropriate and nasal secretions for changes in amount and color  - [place] appropriate cooling/warming therapies per order  - Administer medications as ordered  - Instruct and encourage patient and family to use good hand hygiene technique  - Identify and instruct in appropriate isolation precautions for identified infection/condition  Outcome: Progressing  Goal: Absence of fever/infection during neutropenic period  Description  INTERVENTIONS:  - Monitor WBC    Outcome: Progressing

## 2020-03-10 LAB
ANION GAP SERPL CALCULATED.3IONS-SCNC: 11 MMOL/L (ref 4–13)
ANION GAP SERPL CALCULATED.3IONS-SCNC: 11 MMOL/L (ref 4–13)
BASOPHILS # BLD AUTO: 0.05 THOUSANDS/ΜL (ref 0–0.1)
BASOPHILS NFR BLD AUTO: 1 % (ref 0–1)
BUN SERPL-MCNC: 11 MG/DL (ref 5–25)
BUN SERPL-MCNC: 9 MG/DL (ref 5–25)
CALCIUM SERPL-MCNC: 7.7 MG/DL (ref 8.3–10.1)
CALCIUM SERPL-MCNC: 7.8 MG/DL (ref 8.3–10.1)
CHLORIDE SERPL-SCNC: 110 MMOL/L (ref 100–108)
CHLORIDE SERPL-SCNC: 114 MMOL/L (ref 100–108)
CO2 SERPL-SCNC: 17 MMOL/L (ref 21–32)
CO2 SERPL-SCNC: 19 MMOL/L (ref 21–32)
CREAT SERPL-MCNC: 0.8 MG/DL (ref 0.6–1.3)
CREAT SERPL-MCNC: 0.84 MG/DL (ref 0.6–1.3)
EOSINOPHIL # BLD AUTO: 0.19 THOUSAND/ΜL (ref 0–0.61)
EOSINOPHIL NFR BLD AUTO: 4 % (ref 0–6)
ERYTHROCYTE [DISTWIDTH] IN BLOOD BY AUTOMATED COUNT: 14.4 % (ref 11.6–15.1)
GFR SERPL CREATININE-BSD FRML MDRD: 90 ML/MIN/1.73SQ M
GFR SERPL CREATININE-BSD FRML MDRD: 95 ML/MIN/1.73SQ M
GLUCOSE SERPL-MCNC: 124 MG/DL (ref 65–140)
GLUCOSE SERPL-MCNC: 81 MG/DL (ref 65–140)
HCT VFR BLD AUTO: 32.9 % (ref 34.8–46.1)
HGB BLD-MCNC: 10.6 G/DL (ref 11.5–15.4)
IMM GRANULOCYTES # BLD AUTO: 0.01 THOUSAND/UL (ref 0–0.2)
IMM GRANULOCYTES NFR BLD AUTO: 0 % (ref 0–2)
LYMPHOCYTES # BLD AUTO: 2.06 THOUSANDS/ΜL (ref 0.6–4.47)
LYMPHOCYTES NFR BLD AUTO: 40 % (ref 14–44)
MAGNESIUM SERPL-MCNC: 2.1 MG/DL (ref 1.6–2.6)
MCH RBC QN AUTO: 29.7 PG (ref 26.8–34.3)
MCHC RBC AUTO-ENTMCNC: 32.2 G/DL (ref 31.4–37.4)
MCV RBC AUTO: 92 FL (ref 82–98)
MONOCYTES # BLD AUTO: 0.44 THOUSAND/ΜL (ref 0.17–1.22)
MONOCYTES NFR BLD AUTO: 9 % (ref 4–12)
NEUTROPHILS # BLD AUTO: 2.44 THOUSANDS/ΜL (ref 1.85–7.62)
NEUTS SEG NFR BLD AUTO: 46 % (ref 43–75)
NRBC BLD AUTO-RTO: 0 /100 WBCS
PLATELET # BLD AUTO: 251 THOUSANDS/UL (ref 149–390)
PMV BLD AUTO: 10.6 FL (ref 8.9–12.7)
POTASSIUM SERPL-SCNC: 3 MMOL/L (ref 3.5–5.3)
POTASSIUM SERPL-SCNC: 4.2 MMOL/L (ref 3.5–5.3)
RBC # BLD AUTO: 3.57 MILLION/UL (ref 3.81–5.12)
SODIUM SERPL-SCNC: 140 MMOL/L (ref 136–145)
SODIUM SERPL-SCNC: 142 MMOL/L (ref 136–145)
WBC # BLD AUTO: 5.19 THOUSAND/UL (ref 4.31–10.16)

## 2020-03-10 PROCEDURE — 99222 1ST HOSP IP/OBS MODERATE 55: CPT | Performed by: NURSE PRACTITIONER

## 2020-03-10 PROCEDURE — 85025 COMPLETE CBC W/AUTO DIFF WBC: CPT | Performed by: FAMILY MEDICINE

## 2020-03-10 PROCEDURE — 80048 BASIC METABOLIC PNL TOTAL CA: CPT | Performed by: NURSE PRACTITIONER

## 2020-03-10 PROCEDURE — 99225 PR SBSQ OBSERVATION CARE/DAY 25 MINUTES: CPT | Performed by: INTERNAL MEDICINE

## 2020-03-10 PROCEDURE — 80048 BASIC METABOLIC PNL TOTAL CA: CPT | Performed by: FAMILY MEDICINE

## 2020-03-10 PROCEDURE — 83735 ASSAY OF MAGNESIUM: CPT | Performed by: NURSE PRACTITIONER

## 2020-03-10 RX ORDER — ACETAMINOPHEN 325 MG/1
650 TABLET ORAL EVERY 6 HOURS PRN
Status: DISCONTINUED | OUTPATIENT
Start: 2020-03-10 | End: 2020-03-12 | Stop reason: HOSPADM

## 2020-03-10 RX ORDER — POTASSIUM CHLORIDE 14.9 MG/ML
20 INJECTION INTRAVENOUS
Status: COMPLETED | OUTPATIENT
Start: 2020-03-10 | End: 2020-03-11

## 2020-03-10 RX ORDER — SODIUM BICARBONATE 650 MG/1
650 TABLET ORAL
Status: DISCONTINUED | OUTPATIENT
Start: 2020-03-10 | End: 2020-03-11

## 2020-03-10 RX ADMIN — POTASSIUM CHLORIDE 40 MEQ: 20 SOLUTION ORAL at 11:31

## 2020-03-10 RX ADMIN — SODIUM BICARBONATE 650 MG TABLET 650 MG: at 14:50

## 2020-03-10 RX ADMIN — POTASSIUM CHLORIDE 40 MEQ: 20 SOLUTION ORAL at 21:00

## 2020-03-10 RX ADMIN — POTASSIUM CHLORIDE 20 MEQ: 14.9 INJECTION, SOLUTION INTRAVENOUS at 11:30

## 2020-03-10 RX ADMIN — TOPIRAMATE 100 MG: 100 TABLET, FILM COATED ORAL at 08:32

## 2020-03-10 RX ADMIN — MECLIZINE HYDROCHLORIDE 25 MG: 25 TABLET ORAL at 21:00

## 2020-03-10 RX ADMIN — POTASSIUM CHLORIDE 20 MEQ: 14.9 INJECTION, SOLUTION INTRAVENOUS at 15:41

## 2020-03-10 RX ADMIN — POTASSIUM CHLORIDE 20 MEQ: 14.9 INJECTION, SOLUTION INTRAVENOUS at 17:46

## 2020-03-10 RX ADMIN — POTASSIUM CHLORIDE 20 MEQ: 14.9 INJECTION, SOLUTION INTRAVENOUS at 20:52

## 2020-03-10 RX ADMIN — POTASSIUM CHLORIDE 20 MEQ: 14.9 INJECTION, SOLUTION INTRAVENOUS at 09:05

## 2020-03-10 RX ADMIN — AMITRIPTYLINE HYDROCHLORIDE 50 MG: 50 TABLET, FILM COATED ORAL at 21:00

## 2020-03-10 RX ADMIN — TOPIRAMATE 100 MG: 100 TABLET, FILM COATED ORAL at 17:46

## 2020-03-10 RX ADMIN — SODIUM CHLORIDE, SODIUM GLUCONATE, SODIUM ACETATE, POTASSIUM CHLORIDE AND MAGNESIUM CHLORIDE 75 ML/HR: 526; 502; 368; 37; 30 INJECTION, SOLUTION INTRAVENOUS at 20:53

## 2020-03-10 RX ADMIN — B-COMPLEX W/ C & FOLIC ACID TAB 1 TABLET: TAB at 17:45

## 2020-03-10 RX ADMIN — AMILORIDE HYDROCLORIDE 5 MG: 5 TABLET ORAL at 09:46

## 2020-03-10 RX ADMIN — ZINC SULFATE 220 MG (50 MG) CAPSULE 220 MG: CAPSULE at 08:32

## 2020-03-10 RX ADMIN — POTASSIUM CHLORIDE 40 MEQ: 20 SOLUTION ORAL at 17:45

## 2020-03-10 RX ADMIN — SODIUM CHLORIDE, SODIUM GLUCONATE, SODIUM ACETATE, POTASSIUM CHLORIDE AND MAGNESIUM CHLORIDE 75 ML/HR: 526; 502; 368; 37; 30 INJECTION, SOLUTION INTRAVENOUS at 11:34

## 2020-03-10 RX ADMIN — POTASSIUM CHLORIDE 40 MEQ: 20 SOLUTION ORAL at 06:00

## 2020-03-10 RX ADMIN — POTASSIUM CHLORIDE 40 MEQ: 20 SOLUTION ORAL at 14:50

## 2020-03-10 RX ADMIN — Medication 100 MG: at 08:32

## 2020-03-10 RX ADMIN — POTASSIUM CHLORIDE 20 MEQ: 14.9 INJECTION, SOLUTION INTRAVENOUS at 13:41

## 2020-03-10 NOTE — SOCIAL WORK
Cm met with the patient to evaluate the patients prior function and living situation and any barriers to d/c and form a safe d/c plan  Cm also evaluated the patient for any services in the home or needs for services  Pt resides at home with her spouse and children in a single story home  0 [address]  Pt is independent with her adls and ambulation  Pt goes to OP infusion 2x's a week at [address] [place] [address]  No DME  Pt and spouse both drive  PCP is DR Tere Miranda and pharmacy is [facility]  Pt is in the process of trying to be set up with [facility] [place] (They have 's who come up from [place]) -[facility] clinic  Awaiting Dr Tere Miranda to complete the necessary paperwork that will then be sent back to [address] [place] [address]  Plans at this time are home on dc with resumption of OP infusions and OP follow up  Cm will follow and assist in dc planning

## 2020-03-10 NOTE — ASSESSMENT & PLAN NOTE
· With chronic malabsorption with recurrent hospital admissions for acute on chronic hypokalemia  · Outpatient follow-up with Bariatric Surgery

## 2020-03-10 NOTE — ASSESSMENT & PLAN NOTE
· Still with symptomatic hypokalemia causing paresthesias  · Give potassium chloride IV every 2 hours x 6 doses  · Continue potassium chloride 40 meQ PO 5 times per day  · The patient was seen in consultation  · Follow the potassium level

## 2020-03-10 NOTE — PLAN OF CARE
Problem: INFECTION - ADULT  Goal: Absence or prevention of progression during hospitalization  Description  INTERVENTIONS:  - Assess and monitor for signs and symptoms of infection  - Monitor lab/diagnostic results  - Monitor all insertion sites, i e  indwelling lines, tubes, and drains  - Administer medications as ordered  - Instruct and encourage patient and family to use good hand hygiene technique  - Identify and instruct in appropriate isolation precautions for identified infection/condition   Outcome: Progressing     Problem: SAFETY ADULT  Goal: Patient will remain free of falls  Description  INTERVENTIONS:  - Assess patient frequently for physical needs  -  Identify cognitive and physical deficits and behaviors that affect risk of falls    -  [place] fall precautions as indicated by assessment   - Educate patient/family on patient safety including physical limitations  - Instruct patient to call for assistance with activity based on assessment  - Modify environment to reduce risk of injury  - Consider OT/PT consult to assist with strengthening/mobility  Outcome: Progressing  Goal: Maintain or return to baseline ADL function  Description  INTERVENTIONS:  -  Assess patient's ability to carry out ADLs; assess patient's baseline for ADL function and identify physical deficits which impact ability to perform ADLs (bathing, care of mouth/teeth, toileting, grooming, dressing, etc )  - Assess/evaluate cause of self-care deficits   - Assess range of motion  - Assess patient's mobility; develop plan if impaired  - Assess patient's need for assistive devices and provide as appropriate  - Encourage maximum independence but intervene and supervise when necessary  - Involve family in performance of ADLs  - Assess for home care needs following discharge   - Consider OT consult to assist with ADL evaluation and planning for discharge  - Provide patient education as appropriate  Outcome: Progressing  Goal: Maintain or return mobility status to optimal level  Description  INTERVENTIONS:  - Assess patient's baseline mobility status (ambulation, transfers, stairs, etc )    - Identify cognitive and physical deficits and behaviors that affect mobility  - Identify mobility aids required to assist with transfers and/or ambulation (gait belt, sit-to-stand, lift, walker, cane, etc )  - [place] fall precautions as indicated by assessment  - Record patient progress and toleration of activity level on Mobility SBAR; progress patient to next Phase/Stage  - Instruct patient to call for assistance with activity based on assessment  - Consider rehabilitation consult to assist with strengthening/weightbearing, etc   Outcome: Progressing     Problem: DISCHARGE PLANNING  Goal: Discharge to home or other facility with appropriate resources  Description  INTERVENTIONS:  - Identify barriers to discharge w/patient and caregiver  - Arrange for needed discharge resources and transportation as appropriate  - Identify discharge learning needs (meds, wound care, etc )  - Refer to Case Management Department for coordinating discharge planning if the patient needs post-hospital services based on physician/advanced practitioner order or complex needs related to functional status, cognitive ability, or social support system   Outcome: Progressing     Problem: Knowledge Deficit  Goal: Patient/family/caregiver demonstrates understanding of disease process, treatment plan, medications, and discharge instructions  Description  Complete learning assessment and assess knowledge base    Interventions:  - Provide teaching at level of understanding  - Provide teaching via preferred learning methods  Outcome: Progressing     Problem: METABOLIC, FLUID AND ELECTROLYTES - ADULT  Goal: Electrolytes maintained within normal limits  Description  INTERVENTIONS:  - Monitor labs and assess patient for signs and symptoms of electrolyte imbalances  - Administer electrolyte replacement as ordered  - Monitor response to electrolyte replacements, including repeat lab results as appropriate  - Instruct patient on fluid and nutrition as appropriate  Outcome: Progressing

## 2020-03-10 NOTE — CONSULTS
Michelle Patino 38 y o  female MRN: [id number]  Unit/Bed#: 403-01 Encounter: [id number]        Assessment and Plan:    1  Acute on chronic hypokalemia  · First discovered in July of 2018 and is status post extensive workup in the past  · RTA and other renal losses have been excluded  She has had a normal colonoscopy  Her urine potassium is chronically low  There continues to be no evidence of diuretic abuse or self-induced vomiting  · [facility]  · Admission potassium 2 4 millimoles per L -> 3 0 millimoles per L this morning  · Is to continue receiving 200 mEq potassium chloride daily  · Will receive 20 mEq potassium riders x 6 doses today per primary team   She may need 20-40 mEq overnight  · Goal potassium above 4 0 mmol/L  · Consider 3 times weekly potassium infusions  · She needs a "sick day" plan in place  She needs to call the office with illnesses that preclude her from taking her oral potassium more that may interfere with the absorption of potassium so that we can schedule additional potassium doses for her  · Continue amiloride  · Add on Mag level  · Recheck BMP at 6 pm in order to guide further replacement therapy  2  Metabolic acidosis:  Improving from previous admissions  · Discovered in February admission and was started on sodium bicarb  · She is currently on isolyte  · Monitor closely  3  Anemia of chronic disease and iron deficiency:  Most recent hemoglobin 10 6 grams/deciliter  · Iron panel in March of 2019 showed an iron sat of 8%  Will repeat  4  History of Roshan-en-Y gastric bypass    HPI:    Florencio Riojas is a 38 y o  female with an active problem list significant for gastric bypass, acute on chronic hypokalemia, migraine, who is known to this service for persistent and severe hypokalemia    She has had 2 recent hospitalizations for symptomatic hypokalemia 2nd to strep throat and ear infections which precluded her from taking her oral potassium therapy per patient report  She receives potassium infusions twice per week in addition to 200 mEq of oral potassium daily  She was admitted to [address] [place] [address] 2024 general paresthesias and potassium level of 2 4 mmol/L  Upon discussion with the patient, she states she has been in her usual state of health except for having bilateral ear infections for which she was on steroids for  States that this interfered with her potassium absorption  Otherwise, no fevers no chills  No recent vomiting, diarrheal illnesses  No shortness of breath, no chest pain, no dizziness  Discuss the possibility that patient may need to have infusions 3 times weekly and she is agreeable to this  Also discussed she needs a "sick plan" in place for when she is ill and cannot take her p o  Potassium regimen  She needs to alert her primary nephrologist so additional potassium infusions can be ordered for her  The medical record, including Care Everywhere and media tabs were reviewed  Reason for Consult:  Acute on chronic severe hypokalemia    Review of Systems:  A complete 10-point review of systems was performed  Aside from what was mentioned in the HPI, it is otherwise negative  Historical Information   Past Medical History:   Diagnosis Date    H  pylori infection     Hypokalemia     Migraine     Renal disorder     Rhabdomyolysis      Past Surgical History:   Procedure Laterality Date    ABDOMINAL SURGERY      APPENDECTOMY       SECTION      CHOLECYSTECTOMY      COSMETIC SURGERY      FL GUIDED CENTRAL VENOUS ACCESS DEVICE INSERTION  2018    GASTRIC BYPASS      HYSTERECTOMY      TUNNELED VENOUS PORT PLACEMENT N/A 2018    Procedure: INSERTION VENOUS PORT (PORT-A-CATH);   Surgeon: Lucas Paez DO;  Location: [facility];  Service: General     Social History   Social History     Substance and Sexual Activity   Alcohol Use Never  Alcohol/week: 0 0 standard drinks    Frequency: Never    Drinks per session: Patient refused    Binge frequency: Never    Comment: 0     Social History     Substance and Sexual Activity   Drug Use No     Social History     Tobacco Use   Smoking Status Never Smoker   Smokeless Tobacco Never Used       Family History:   Family History   Problem Relation Age of Onset    No Known Problems Mother     Hypertension Father     Diabetes Father     Diabetes Maternal Grandfather        Medications:  Pertinent medications were reviewed    Current Facility-Administered Medications:  acetaminophen 650 mg Oral Q6H PRN Zahira Oviedo MD    AMILoride 5 mg Oral Daily Zahira Oviedo MD    amitriptyline 50 mg Oral HS Zahira Oviedo MD    meclizine 25 mg Oral HS Zahira Oviedo MD    multi-electrolyte 75 mL/hr Intravenous Continuous Zahira Oviedo MD Last Rate: 75 mL/hr (03/09/20 2122)   multivitamin stress formula 1 tablet Oral Daily With Abraham Aly MD    ondansetron 4 mg Intravenous Q6H PRN Zahira Oviedo MD    potassium chloride 40 mEq Oral 5x Daily Nadja Pollard MD    potassium chloride 20 mEq Intravenous Q2H Papo Carr DO Last Rate: 20 mEq (03/10/20 0905)   SUMAtriptan 100 mg Oral Once PRN Zahira Oviedo MD    thiamine 100 mg Oral Daily Zahira Oviedo MD    topiramate 100 mg Oral BID Zahira Oviedo MD    zinc sulfate 220 mg Oral Daily Zahira Oviedo MD          Allergies   Allergen Reactions    Nsaids Other (See Comments)     Other reaction(s): Other (Please comment)  Should not take s/p bariatric surgery  Other reaction(s): Other (See Comments)  Should not take as per prior records  Should not take s/p bariatric surgery  Has hx of gastric bypass      Prednisone      Nausea, vomiting, diarrhea         Vitals:   /65   Pulse 82   Temp 98 °F (36 7 °C)   Resp 18   Ht 5' 3" (1 6 m)   Wt 70 3 kg (154 lb 15 7 oz)   SpO2 100%   BMI 27 45 kg/m²   Body mass index is 27 45 kg/m²    SpO2: 100 %, SpO2 Activity: At Rest,   O2 Device: None (Room air)      Intake/Output Summary (Last 24 hours) at 3/10/2020 1028  Last data filed at 3/9/2020 2122  Gross per 24 hour   Intake 1478 75 ml   Output 2 ml   Net 1476 75 ml     Invasive Devices     Central Venous Catheter Line            Port A Cath 12/22/18 Right Chest 443 days                Physical Exam:  General: conscious, cooperative, in no acute distress  Eyes: conjunctivae pink, anicteric sclerae  ENT: lips and mucous membranes moist  Neck: supple, no JVD, no masses  Chest:  Essentially clear breath sounds bilateral, no crackles, ronchus or wheezings  CVS: distinct S1 & S2, normal rate, regular rhythm  Abdomen: soft, non-tender, non-distended, normoactive bowel sounds  Extremities: no edema of both legs  Skin: no rash  Neuro: awake, alert, oriented      Diagnostic Data:  Lab: I have personally reviewed pertinent lab results  ,   CBC:  Results from last 7 days   Lab Units 03/10/20  0550   WBC Thousand/uL 5 19   HEMOGLOBIN g/dL 10 6*   HEMATOCRIT % 32 9*   PLATELETS Thousands/uL 251      CMP: Lab Results   Component Value Date    SODIUM 140 03/10/2020    K 3 0 (L) 03/10/2020     (H) 03/10/2020    CO2 19 (L) 03/10/2020    BUN 11 03/10/2020    CREATININE 0 84 03/10/2020    CALCIUM 7 8 (L) 03/10/2020    EGFR 90 03/10/2020   ,   PT/INR: No results found for: PT, INR,   Magnesium: No components found for: MAG,  Phosphorous: No results found for: PHOS    Microbiology:  @LABRCNT,(urinecx:7)@        ARIADNA Ma    Portions of the record may have been created with voice recognition software  Occasional wrong word or "sound a like" substitutions may have occurred due to the inherent limitations of voice recognition software  Read the chart carefully and recognize, using context, where substitutions have occurred

## 2020-03-10 NOTE — UTILIZATION REVIEW
Initial Clinical Review  Observation 3/9/20 @ 5204, converted to inpatient admission 3/10/20 @ 1411 for continued care & tx for hypokalemia  Per MD:  Persistent hypokalemia with persistent parasthesias  To receive iv k dosing q2h x 6 doses with po k 5x/day  Repeat labs scheduled, renal consulted  ivf with isolyte continued  Admitting Physician Chilango Alejandra    Level of Care Med Surg    Estimated length of stay More than 2 Midnights    Certification I certify that inpatient services are medically necessary for this patient for a duration of greater than two midnights  See H&P and MD Progress Notes for additional information about the patient's course of treatment            Order History   Inpatient   Date/Time Action Taken User Additional Information   03/10/20 1411 Release Samanta Castellon DO (auto-released) From Order: [id number]   03/10/20 1411 Complete Samanta Castellon DO    Assessment/Plan:   38 y o  female with past history of obesity status post gastric bypass surgery and chronic malabsorption frequent hospitalizations with acute on chronic hypokalemia who presents with a 2 day history of generalized paresthesias  Patient is seen at the infusion center twice weekly for IV potassium infusions and was noted potassium level 2 4 recommended present to the hospital for inpatient admission and IV potassium replacement  The patient reports generalized paresthesias since her entire body vibrating     ED Triage Vitals   Temperature Pulse Respirations Blood Pressure SpO2   03/09/20 1118 03/09/20 1118 03/09/20 1118 03/09/20 1118 03/09/20 1118   97 8 °F (36 6 °C) 77 16 107/80 100 %      Temp Source Heart Rate Source Patient Position - Orthostatic VS BP Location FiO2 (%)   03/09/20 1120 -- 03/09/20 1120 03/09/20 1120 --   Oral  Lying Left arm       Pain Score       03/09/20 1121       No Pain        Wt Readings from Last 1 Encounters:   03/10/20 70 3 kg (154 lb 15 7 oz)     Additional Vital Signs: 03/10/20 07:10:29  98 °F (36 7 °C)  82  18  109/65  80  100 %  --  --   03/09/20 21:52:16  98 5 °F (36 9 °C)  71  16  108/63  78  99 %  --  --   03/09/20 15:30:11  98 2 °F (36 8 °C)  71  16  109/76  87  100 %  --  --   03/09/20 1150  --  --  --  --  --  --  None (Room air)  --   03/09/20 1120  97 6 °F (36 4 °C)  77  16  107/80  89  100 %  None (Room air)  Lying   03/09/20 11:18:29  97 8 °F (36 6 °C)  77  16  107/80  89  100 %  --  --   Pertinent Labs/Diagnostic Test Results:   Results from last 7 days   Lab Units 03/10/20  0550 03/04/20  0515 03/03/20  1119   WBC Thousand/uL 5 19 5 68 7 51   HEMOGLOBIN g/dL 10 6* 10 4* 12 3   HEMATOCRIT % 32 9* 32 0* 37 0   PLATELETS Thousands/uL 251 261 317   NEUTROS ABS Thousands/µL 2 44 2 26 4 15     Results from last 7 days   Lab Units 03/10/20  0550 03/09/20  0912 03/05/20  0900 03/04/20  0515 03/03/20  1119   SODIUM mmol/L 140 137 139 141 141   POTASSIUM mmol/L 3 0* 2 4* 2 8* 3 4* 2 1*   CHLORIDE mmol/L 110* 103 107 111* 107   CO2 mmol/L 19* 20* 20* 19* 18*   ANION GAP mmol/L 11 14* 12 11 16*   BUN mg/dL 11 11 11 15 11   CREATININE mg/dL 0 84 1 05 0 85 0 83 0 92   EGFR ml/min/1 73sq m 90 68 88 91 80   CALCIUM mg/dL 7 8* 8 3 8 1* 7 7* 7 9*   MAGNESIUM mg/dL  --   --   --   --  2 0     Results from last 7 days   Lab Units 03/03/20  1119   AST U/L 16   ALT U/L 24   ALK PHOS U/L 46   TOTAL PROTEIN g/dL 7 0   ALBUMIN g/dL 3 7   TOTAL BILIRUBIN mg/dL 0 30     Results from last 7 days   Lab Units 03/10/20  0550 03/09/20  0912 03/05/20  0900 03/04/20  0515 03/03/20  1119 03/03/20  0917   GLUCOSE RANDOM mg/dL 81 81 85 76 79 82     Past Medical History:   Diagnosis Date    H  pylori infection     Hypokalemia     Migraine     Renal disorder     Rhabdomyolysis      Present on Admission:   Hypokalemia   Other intestinal malabsorption  Admitting Diagnosis: Hypokalemia [E87 6]  Age/Sex: 38 y o  female  Admission Orders:  scd's  Consult renal  Scheduled Medications:  acetaminophen 650 mg Oral Q6H PRN   AMILoride 5 mg Oral Daily   amitriptyline 50 mg Oral HS   enoxaparin 40 mg Subcutaneous Q24H   meclizine 25 mg Oral HS   multivitamin stress formula 1 tablet Oral Daily With Dinner   ondansetron 4 mg Intravenous Q6H PRN   potassium chloride 40 mEq Oral 5x Daily   potassium chloride 20 mEq Intravenous Q2H   sodium bicarbonate 650 mg Oral BID after meals   SUMAtriptan 100 mg Oral Once PRN   thiamine 100 mg Oral Daily   topiramate 100 mg Oral BID   zinc sulfate 220 mg Oral Daily     Continuous IV Infusions:  multi-electrolyte 75 mL/hr Intravenous Continuous     Network Utilization Review Department  [email or web address]  ATTENTION: Please call with any questions or concerns to [phone] and carefully listen to the prompts so that you are directed to the right person  All voicemails are confidential   Julia Spencer all requests for admission clinical reviews, approved or denied determinations and any other requests to dedicated fax number below belonging to the campus where the patient is receiving treatment   List of dedicated fax numbers for the Facilities:  [address] [place] [address] DENIALS (Administrative/Medical Necessity) [phone]   [address] (Maternity/NICU/Pediatrics) [phone]   Rupa Polanco [phone]   Iav [place] [phone]   Susan Ordonez [phone]   Merlinda [place] [phone]   [address] [phone]   [facility]  [phone]   [address] [facility] [address] [facility]  [address]01 [facility] [address] [facility] [phone]

## 2020-03-10 NOTE — PROGRESS NOTES
Progress Note - Alvina Rio 1985, 38 y o  female MRN: [id number]    Unit/Bed#: 403-01 Encounter: [id number]    Primary Care Provider: Xiomara Mccarty DO   Date and time admitted to hospital: 3/9/2020 11:16 AM        * Hypokalemia  Assessment & Plan  · Still with symptomatic hypokalemia causing paresthesias  · Give potassium chloride IV every 2 hours x 6 doses  · Continue potassium chloride 40 meQ PO 5 times per day  · The patient was seen in consultation by Nephrology  · Follow the potassium level    Normal anion gap metabolic acidosis  Assessment & Plan  · Utilize sodium bicarbonate 650 mg PO BID with meals  · Follow the sodium bicarbonate level    Vitamin D deficiency  Assessment & Plan  · Continue ergocalciferol supplementation upon discharge    Anemia  Assessment & Plan  · Check an iron panel, vitamin B12, and folate level  · Follow the CBC  · Transfuse for a hemoglobin less than 7 g/dl    History of gastric bypass  Assessment & Plan  · With chronic malabsorption with recurrent hospital admissions for acute on chronic hypokalemia  · Outpatient follow-up with Bariatric Surgery      VTE Pharmacologic Prophylaxis:   Pharmacologic: Enoxaparin (Lovenox)  Mechanical VTE Prophylaxis in Place: Yes    Patient Centered Rounds: I have performed bedside rounds with nursing staff today  Time Spent for Care: 30 minutes  More than 50% of total time spent on counseling and coordination of care as described above  Current Length of Stay: 1 day(s)    Current Patient Status: Observation   Certification Statement: The patient continues to require additional hospitalization for IV potassium chloride supplementation  Code Status: Level 1 - Full Code      Subjective: The patient was seen and examined  The patient continues to complain of extremity paresthesias  No chest pain  No shortness of breath  No abdominal pain  No nausea or vomiting        Objective:     Vitals:   Temp (24hrs), Av 2 °F (36 8 °C), Min:98 °F (36 7 °C), Max:98 5 °F (36 9 °C)    Temp:  [98 °F (36 7 °C)-98 5 °F (36 9 °C)] 98 °F (36 7 °C)  HR:  [71-82] 82  Resp:  [16-18] 18  BP: (108-109)/(63-76) 109/65  SpO2:  [99 %-100 %] 100 %  Body mass index is 27 45 kg/m²  Input and Output Summary (last 24 hours): Intake/Output Summary (Last 24 hours) at 3/10/2020 1324  Last data filed at 3/9/2020 2122  Gross per 24 hour   Intake 1298 75 ml   Output 2 ml   Net 1296 75 ml       Physical Exam:     Physical Exam  General:  NAD, follows commands  HEENT:  NC/AT, mucous membranes moist  Neck:  Supple, No JVP elevation  CV:  + S1, + S2, RRR  Pulm:  Lung fields are CTA bilaterally  Abd:  Soft, Non-tender, Non-distended  Ext:  No clubbing/cyanosis/edema  Skin:  No rashes  Neuro:  Awake, alert, oriented  Psych:  Normal mood and affect      Additional Data:    Labs:    Results from last 7 days   Lab Units 03/10/20  0550   WBC Thousand/uL 5 19   HEMOGLOBIN g/dL 10 6*   HEMATOCRIT % 32 9*   PLATELETS Thousands/uL 251   NEUTROS PCT % 46   LYMPHS PCT % 40   MONOS PCT % 9   EOS PCT % 4     Results from last 7 days   Lab Units 03/10/20  0550   SODIUM mmol/L 140   POTASSIUM mmol/L 3 0*   CHLORIDE mmol/L 110*   CO2 mmol/L 19*   BUN mg/dL 11   CREATININE mg/dL 0 84   ANION GAP mmol/L 11   CALCIUM mg/dL 7 8*   GLUCOSE RANDOM mg/dL 81                           * I Have Reviewed All Lab Data Listed Above  * Additional Pertinent Lab Tests Reviewed:  Veto 66 Admission Reviewed      Recent Cultures (last 7 days):           Last 24 Hours Medication List:     Current Facility-Administered Medications:  acetaminophen 650 mg Oral Q6H PRN Papo Carr DO    AMILoride 5 mg Oral Daily Nadja Pollard MD    amitriptyline 50 mg Oral HS Nadja Pollard MD    enoxaparin 40 mg Subcutaneous Q24H Bernardo Bowman DO    meclizine 25 mg Oral HS Kevanmarilyn Butt MD    multi-electrolyte 75 mL/hr Intravenous Continuous Kevan Butt MD Last Rate: 75 mL/hr (03/10/20 1134)   multivitamin stress formula 1 tablet Oral Daily With Gary Yusuf MD    ondansetron 4 mg Intravenous Q6H PRN Jen Cortes MD    potassium chloride 40 mEq Oral 5x Daily Jen Cortes MD    potassium chloride 20 mEq Intravenous Q2H Jagdeep Abel DO Last Rate: 20 mEq (03/10/20 1130)   sodium bicarbonate 650 mg Oral BID after meals Jagdeep Abel DO    SUMAtriptan 100 mg Oral Once PRN Jen Cortes MD    thiamine 100 mg Oral Daily Jen Cortes MD    topiramate 100 mg Oral BID Jen Cortes MD    zinc sulfate 220 mg Oral Daily Jen Cortes MD         Today, Patient Was Seen By: Jagdeep Abel DO    ** Please Note: Dictation voice to text software may have been used in the creation of this document   **

## 2020-03-11 ENCOUNTER — TELEPHONE (OUTPATIENT)
Dept: NEPHROLOGY | Facility: CLINIC | Age: 35
End: 2020-03-11

## 2020-03-11 LAB
ALBUMIN SERPL BCP-MCNC: 2.7 G/DL (ref 3.5–5)
ALP SERPL-CCNC: 41 U/L (ref 46–116)
ALT SERPL W P-5'-P-CCNC: 18 U/L (ref 12–78)
ANION GAP SERPL CALCULATED.3IONS-SCNC: 11 MMOL/L (ref 4–13)
ANION GAP SERPL CALCULATED.3IONS-SCNC: 12 MMOL/L (ref 4–13)
ANION GAP SERPL CALCULATED.3IONS-SCNC: 9 MMOL/L (ref 4–13)
AST SERPL W P-5'-P-CCNC: 14 U/L (ref 5–45)
BASE EX.OXY STD BLDV CALC-SCNC: 85.6 % (ref 60–80)
BASE EXCESS BLDV CALC-SCNC: -12.4 MMOL/L
BASOPHILS # BLD AUTO: 0.04 THOUSANDS/ΜL (ref 0–0.1)
BASOPHILS NFR BLD AUTO: 1 % (ref 0–1)
BILIRUB SERPL-MCNC: 0.1 MG/DL (ref 0.2–1)
BUN SERPL-MCNC: 7 MG/DL (ref 5–25)
BUN SERPL-MCNC: 8 MG/DL (ref 5–25)
BUN SERPL-MCNC: 8 MG/DL (ref 5–25)
CALCIUM SERPL-MCNC: 7.6 MG/DL (ref 8.3–10.1)
CALCIUM SERPL-MCNC: 7.7 MG/DL (ref 8.3–10.1)
CALCIUM SERPL-MCNC: 7.8 MG/DL (ref 8.3–10.1)
CHLORIDE SERPL-SCNC: 112 MMOL/L (ref 100–108)
CHLORIDE SERPL-SCNC: 112 MMOL/L (ref 100–108)
CHLORIDE SERPL-SCNC: 113 MMOL/L (ref 100–108)
CO2 SERPL-SCNC: 14 MMOL/L (ref 21–32)
CO2 SERPL-SCNC: 15 MMOL/L (ref 21–32)
CO2 SERPL-SCNC: 17 MMOL/L (ref 21–32)
CREAT SERPL-MCNC: 0.7 MG/DL (ref 0.6–1.3)
CREAT SERPL-MCNC: 0.74 MG/DL (ref 0.6–1.3)
CREAT SERPL-MCNC: 0.78 MG/DL (ref 0.6–1.3)
EOSINOPHIL # BLD AUTO: 0.2 THOUSAND/ΜL (ref 0–0.61)
EOSINOPHIL NFR BLD AUTO: 4 % (ref 0–6)
ERYTHROCYTE [DISTWIDTH] IN BLOOD BY AUTOMATED COUNT: 15 % (ref 11.6–15.1)
FERRITIN SERPL-MCNC: 10 NG/ML (ref 8–388)
FOLATE SERPL-MCNC: 17.4 NG/ML (ref 3.1–17.5)
GFR SERPL CREATININE-BSD FRML MDRD: 105 ML/MIN/1.73SQ M
GFR SERPL CREATININE-BSD FRML MDRD: 112 ML/MIN/1.73SQ M
GFR SERPL CREATININE-BSD FRML MDRD: 98 ML/MIN/1.73SQ M
GLUCOSE SERPL-MCNC: 100 MG/DL (ref 65–140)
GLUCOSE SERPL-MCNC: 126 MG/DL (ref 65–140)
GLUCOSE SERPL-MCNC: 82 MG/DL (ref 65–140)
HCO3 BLDV-SCNC: 14.5 MMOL/L (ref 24–30)
HCT VFR BLD AUTO: 33.7 % (ref 34.8–46.1)
HGB BLD-MCNC: 10.4 G/DL (ref 11.5–15.4)
IMM GRANULOCYTES # BLD AUTO: 0.02 THOUSAND/UL (ref 0–0.2)
IMM GRANULOCYTES NFR BLD AUTO: 0 % (ref 0–2)
IRON SATN MFR SERPL: 18 %
IRON SERPL-MCNC: 48 UG/DL (ref 50–170)
LYMPHOCYTES # BLD AUTO: 2.01 THOUSANDS/ΜL (ref 0.6–4.47)
LYMPHOCYTES NFR BLD AUTO: 45 % (ref 14–44)
MAGNESIUM SERPL-MCNC: 2 MG/DL (ref 1.6–2.6)
MCH RBC QN AUTO: 29.6 PG (ref 26.8–34.3)
MCHC RBC AUTO-ENTMCNC: 30.9 G/DL (ref 31.4–37.4)
MCV RBC AUTO: 96 FL (ref 82–98)
MONOCYTES # BLD AUTO: 0.38 THOUSAND/ΜL (ref 0.17–1.22)
MONOCYTES NFR BLD AUTO: 8 % (ref 4–12)
NEUTROPHILS # BLD AUTO: 1.89 THOUSANDS/ΜL (ref 1.85–7.62)
NEUTS SEG NFR BLD AUTO: 42 % (ref 43–75)
NRBC BLD AUTO-RTO: 0 /100 WBCS
O2 CT BLDV-SCNC: 13.7 ML/DL
PCO2 BLDV: 36.8 MM HG (ref 42–50)
PH BLDV: 7.21 [PH] (ref 7.3–7.4)
PHOSPHATE SERPL-MCNC: 2 MG/DL (ref 2.7–4.5)
PLATELET # BLD AUTO: 236 THOUSANDS/UL (ref 149–390)
PMV BLD AUTO: 11.4 FL (ref 8.9–12.7)
PO2 BLDV: 55.9 MM HG (ref 35–45)
POTASSIUM SERPL-SCNC: 3.5 MMOL/L (ref 3.5–5.3)
POTASSIUM SERPL-SCNC: 5 MMOL/L (ref 3.5–5.3)
POTASSIUM SERPL-SCNC: 6.2 MMOL/L (ref 3.5–5.3)
PROT SERPL-MCNC: 5.6 G/DL (ref 6.4–8.2)
RBC # BLD AUTO: 3.51 MILLION/UL (ref 3.81–5.12)
SODIUM SERPL-SCNC: 137 MMOL/L (ref 136–145)
SODIUM SERPL-SCNC: 138 MMOL/L (ref 136–145)
SODIUM SERPL-SCNC: 140 MMOL/L (ref 136–145)
TIBC SERPL-MCNC: 274 UG/DL (ref 250–450)
VIT B12 SERPL-MCNC: 91 PG/ML (ref 100–900)
WBC # BLD AUTO: 4.54 THOUSAND/UL (ref 4.31–10.16)

## 2020-03-11 PROCEDURE — 84100 ASSAY OF PHOSPHORUS: CPT | Performed by: INTERNAL MEDICINE

## 2020-03-11 PROCEDURE — 82728 ASSAY OF FERRITIN: CPT | Performed by: NURSE PRACTITIONER

## 2020-03-11 PROCEDURE — 82746 ASSAY OF FOLIC ACID SERUM: CPT | Performed by: INTERNAL MEDICINE

## 2020-03-11 PROCEDURE — 99232 SBSQ HOSP IP/OBS MODERATE 35: CPT | Performed by: INTERNAL MEDICINE

## 2020-03-11 PROCEDURE — 82805 BLOOD GASES W/O2 SATURATION: CPT | Performed by: INTERNAL MEDICINE

## 2020-03-11 PROCEDURE — 83540 ASSAY OF IRON: CPT | Performed by: NURSE PRACTITIONER

## 2020-03-11 PROCEDURE — 83735 ASSAY OF MAGNESIUM: CPT | Performed by: NURSE PRACTITIONER

## 2020-03-11 PROCEDURE — 80048 BASIC METABOLIC PNL TOTAL CA: CPT | Performed by: PHYSICIAN ASSISTANT

## 2020-03-11 PROCEDURE — 85025 COMPLETE CBC W/AUTO DIFF WBC: CPT | Performed by: INTERNAL MEDICINE

## 2020-03-11 PROCEDURE — 80053 COMPREHEN METABOLIC PANEL: CPT | Performed by: NURSE PRACTITIONER

## 2020-03-11 PROCEDURE — 83550 IRON BINDING TEST: CPT | Performed by: NURSE PRACTITIONER

## 2020-03-11 PROCEDURE — 80048 BASIC METABOLIC PNL TOTAL CA: CPT | Performed by: INTERNAL MEDICINE

## 2020-03-11 PROCEDURE — 82607 VITAMIN B-12: CPT | Performed by: INTERNAL MEDICINE

## 2020-03-11 RX ORDER — SODIUM BICARBONATE 650 MG/1
1300 TABLET ORAL
Status: DISCONTINUED | OUTPATIENT
Start: 2020-03-11 | End: 2020-03-12 | Stop reason: HOSPADM

## 2020-03-11 RX ORDER — AMILORIDE HYDROCHLORIDE 5 MG/1
10 TABLET ORAL DAILY
Status: DISCONTINUED | OUTPATIENT
Start: 2020-03-12 | End: 2020-03-12 | Stop reason: HOSPADM

## 2020-03-11 RX ORDER — POTASSIUM CHLORIDE 14.9 MG/ML
20 INJECTION INTRAVENOUS
Status: COMPLETED | OUTPATIENT
Start: 2020-03-11 | End: 2020-03-11

## 2020-03-11 RX ORDER — POTASSIUM CHLORIDE 14.9 MG/ML
20 INJECTION INTRAVENOUS
Status: DISCONTINUED | OUTPATIENT
Start: 2020-03-11 | End: 2020-03-11

## 2020-03-11 RX ORDER — AMILORIDE HYDROCHLORIDE 5 MG/1
5 TABLET ORAL ONCE
Status: COMPLETED | OUTPATIENT
Start: 2020-03-11 | End: 2020-03-11

## 2020-03-11 RX ADMIN — POTASSIUM CHLORIDE 40 MEQ: 20 SOLUTION ORAL at 11:32

## 2020-03-11 RX ADMIN — TOPIRAMATE 100 MG: 100 TABLET, FILM COATED ORAL at 17:21

## 2020-03-11 RX ADMIN — ZINC SULFATE 220 MG (50 MG) CAPSULE 220 MG: CAPSULE at 08:08

## 2020-03-11 RX ADMIN — AMILORIDE HYDROCLORIDE 5 MG: 5 TABLET ORAL at 11:33

## 2020-03-11 RX ADMIN — TOPIRAMATE 100 MG: 100 TABLET, FILM COATED ORAL at 08:08

## 2020-03-11 RX ADMIN — POTASSIUM CHLORIDE 40 MEQ: 20 SOLUTION ORAL at 13:45

## 2020-03-11 RX ADMIN — AMITRIPTYLINE HYDROCHLORIDE 50 MG: 50 TABLET, FILM COATED ORAL at 22:02

## 2020-03-11 RX ADMIN — POTASSIUM CHLORIDE 20 MEQ: 14.9 INJECTION, SOLUTION INTRAVENOUS at 13:47

## 2020-03-11 RX ADMIN — SODIUM BICARBONATE 650 MG TABLET 650 MG: at 08:08

## 2020-03-11 RX ADMIN — AMILORIDE HYDROCLORIDE 5 MG: 5 TABLET ORAL at 08:08

## 2020-03-11 RX ADMIN — Medication 100 MG: at 08:08

## 2020-03-11 RX ADMIN — MECLIZINE HYDROCHLORIDE 25 MG: 25 TABLET ORAL at 22:02

## 2020-03-11 RX ADMIN — POTASSIUM CHLORIDE 40 MEQ: 20 SOLUTION ORAL at 06:03

## 2020-03-11 RX ADMIN — POTASSIUM CHLORIDE 20 MEQ: 14.9 INJECTION, SOLUTION INTRAVENOUS at 09:36

## 2020-03-11 RX ADMIN — POTASSIUM PHOSPHATE, MONOBASIC AND POTASSIUM PHOSPHATE, DIBASIC 12 MMOL: 224; 236 INJECTION, SOLUTION INTRAVENOUS at 16:07

## 2020-03-11 RX ADMIN — POTASSIUM CHLORIDE 20 MEQ: 14.9 INJECTION, SOLUTION INTRAVENOUS at 11:38

## 2020-03-11 RX ADMIN — SODIUM CHLORIDE, SODIUM GLUCONATE, SODIUM ACETATE, POTASSIUM CHLORIDE AND MAGNESIUM CHLORIDE 75 ML/HR: 526; 502; 368; 37; 30 INJECTION, SOLUTION INTRAVENOUS at 11:37

## 2020-03-11 RX ADMIN — POTASSIUM CHLORIDE 40 MEQ: 20 SOLUTION ORAL at 18:41

## 2020-03-11 RX ADMIN — POTASSIUM CHLORIDE 40 MEQ: 20 SOLUTION ORAL at 22:02

## 2020-03-11 RX ADMIN — B-COMPLEX W/ C & FOLIC ACID TAB 1 TABLET: TAB at 16:06

## 2020-03-11 RX ADMIN — SODIUM BICARBONATE 650 MG TABLET 1300 MG: at 17:21

## 2020-03-11 NOTE — ASSESSMENT & PLAN NOTE
· Increase sodium bicarbonate to 1300 mg PO BID with meals per Nephrology  · Follow the sodium bicarbonate level

## 2020-03-11 NOTE — PLAN OF CARE
Problem: INFECTION - ADULT  Goal: Absence or prevention of progression during hospitalization  Description  INTERVENTIONS:  - Assess and monitor for signs and symptoms of infection  - Monitor lab/diagnostic results  - Monitor all insertion sites, i e  indwelling lines, tubes, and drains  - Administer medications as ordered  - Instruct and encourage patient and family to use good hand hygiene technique  - Identify and instruct in appropriate isolation precautions for identified infection/condition   Outcome: Progressing     Problem: SAFETY ADULT  Goal: Patient will remain free of falls  Description  INTERVENTIONS:  - Assess patient frequently for physical needs  -  Identify cognitive and physical deficits and behaviors that affect risk of falls    -  [place] fall precautions as indicated by assessment   - Educate patient/family on patient safety including physical limitations  - Instruct patient to call for assistance with activity based on assessment  - Modify environment to reduce risk of injury  - Consider OT/PT consult to assist with strengthening/mobility  Outcome: Progressing  Goal: Maintain or return to baseline ADL function  Description  INTERVENTIONS:  -  Assess patient's ability to carry out ADLs; assess patient's baseline for ADL function and identify physical deficits which impact ability to perform ADLs (bathing, care of mouth/teeth, toileting, grooming, dressing, etc )  - Assess/evaluate cause of self-care deficits   - Assess range of motion  - Assess patient's mobility; develop plan if impaired  - Assess patient's need for assistive devices and provide as appropriate  - Encourage maximum independence but intervene and supervise when necessary  - Involve family in performance of ADLs  - Assess for home care needs following discharge   - Consider OT consult to assist with ADL evaluation and planning for discharge  - Provide patient education as appropriate  Outcome: Progressing  Goal: Maintain or return mobility status to optimal level  Description  INTERVENTIONS:  - Assess patient's baseline mobility status (ambulation, transfers, stairs, etc )    - Identify cognitive and physical deficits and behaviors that affect mobility  - Identify mobility aids required to assist with transfers and/or ambulation (gait belt, sit-to-stand, lift, walker, cane, etc )  - [place] fall precautions as indicated by assessment  - Record patient progress and toleration of activity level on Mobility SBAR; progress patient to next Phase/Stage  - Instruct patient to call for assistance with activity based on assessment  - Consider rehabilitation consult to assist with strengthening/weightbearing, etc   Outcome: Progressing     Problem: DISCHARGE PLANNING  Goal: Discharge to home or other facility with appropriate resources  Description  INTERVENTIONS:  - Identify barriers to discharge w/patient and caregiver  - Arrange for needed discharge resources and transportation as appropriate  - Identify discharge learning needs (meds, wound care, etc )  - Refer to Case Management Department for coordinating discharge planning if the patient needs post-hospital services based on physician/advanced practitioner order or complex needs related to functional status, cognitive ability, or social support system   Outcome: Progressing     Problem: Knowledge Deficit  Goal: Patient/family/caregiver demonstrates understanding of disease process, treatment plan, medications, and discharge instructions  Description  Complete learning assessment and assess knowledge base    Interventions:  - Provide teaching at level of understanding  - Provide teaching via preferred learning methods  Outcome: Progressing     Problem: METABOLIC, FLUID AND ELECTROLYTES - ADULT  Goal: Electrolytes maintained within normal limits  Description  INTERVENTIONS:  - Monitor labs and assess patient for signs and symptoms of electrolyte imbalances  - Administer electrolyte replacement as ordered  - Monitor response to electrolyte replacements, including repeat lab results as appropriate  - Instruct patient on fluid and nutrition as appropriate  Outcome: Progressing     Problem: DISCHARGE PLANNING - CARE MANAGEMENT  Goal: Discharge to post-acute care or home with appropriate resources  Description  INTERVENTIONS:  - Conduct assessment to determine patient/family and health care team treatment goals, and need for post-acute services based on payer coverage, community resources, and patient preferences, and barriers to discharge  - Address psychosocial, clinical, and financial barriers to discharge as identified in assessment in conjunction with the patient/family and health care team  - Arrange appropriate level of post-acute services according to patient's   needs and preference and payer coverage in collaboration with the physician and health care team  - Communicate with and update the patient/family, physician, and health care team regarding progress on the discharge plan  - Arrange appropriate transportation to post-acute venues  -resumption of OP infusions  -continue process to try and get set up with [address] as an outpatient for their [facility] clinic   Outcome: Progressing

## 2020-03-11 NOTE — SOCIAL WORK
Pt prefers to get her infusion here as an inpatient and cancel her OP infusion at 9 am  And resume her OP infusions next Tuesday March 17th

## 2020-03-11 NOTE — TELEPHONE ENCOUNTER
----- Message from Christie Morris MD sent at 3/10/2020  4:55 PM EDT -----  Potassium 2 4 - when is her infusion?  Double up on KCl until then

## 2020-03-11 NOTE — SOCIAL WORK
Spoke with [facility] [address] infusion center who is aware pt is a tentative dc home tomorrow  Pt is scheduled for OP infusion tomorrow at 9 am and this will likely need to be cancelled  CM inquired about pt going to infusion on Friday 3/13 for her OP infusion and they do not have any openings on Friday here for pt  Pt is next scheduled at [facility] infusion on Tuesday the 17th and again on Thursday the 19th  CM also inquired about if pt needs to start coming in 3xs week for potassium infusion would that be possible  At this time [facility] is not sure they can accomodate pt here 3x's a week and this would require prior authorization  And pt possibly would need to go to [facility] one of those days and that would also require prior authorization

## 2020-03-11 NOTE — UTILIZATION REVIEW
Notification of Inpatient Admission/Inpatient Authorization Request   This is a Notification of Inpatient Admission for [address]  Be advised that this patient was admitted to our facility under Inpatient Status  Contact Deshaun Luque at [phone] for additional admission information  [facility] DEPT  DEDICATED [phone]  Patient Name:   Stephanie Hodge   YOB: 1985       [address]   [address]:   [address]  Tax ID: [id number]  NPI: [id number] Attending Provider/NPI: Jagdeep Abel Do [[id number]]   Place of Service Code: 24     Place of Service Name:  [address] [place] [address]   Start Date: 3/9/20 1116     Discharge Date & Time: No discharge date for patient encounter  Type of Admission: Inpatient Status Discharge Disposition   (if discharged): Home/Self Care   Patient Diagnoses: Hypokalemia [E87 6]     Orders: Admission Orders (From admission, onward)     Ordered        03/10/20 1411  Inpatient Admission  Once         03/09/20 1219  Place in Observation  Once                    Assigned Utilization Review Contact: Deshaun Luque  Utilization   Network Utilization Review Department  Phone: [phone]; Fax [phone]  Email: [email or web address]   ATTENTION PAYERS: Please call the assigned Utilization  directly with any questions or concerns ALL voicemails in the department are confidential  Send all requests for admission clinical reviews, approved or denied determinations and any other requests to dedicated fax number belonging to the campus where the patient is receiving treatment

## 2020-03-11 NOTE — PROGRESS NOTES
Progress Note - Nephrology   Yaneth Grullon 38 y o  female MRN: [id number]  Unit/Bed#: 403-01 Encounter: [id number]    A/P:  1  Hypokalemia:  She is symptomatic when her potassium dropped to 2 8  She feels it in her face and tongue  When it drops lower she starts to feel paresthesias and weakness in her arms  She was admitted with 2 4 and K marquise to 4 2 last night with IV and oral potassium  She does not completely conserve potassium in her urine  She is currently on amiloride 5 mg and tolerating it well  However it has not had the hyperkalemia effect that we had hoped  Will increase to 10 mg   I cautioned her that the chief side effect would be low blood pressure she  She runs a blood pressure 1 100-110 generally  She would sometimes drink salt the liquids in the morning as she likes hot broth  If her blood pressure drop she will drink a salty liquid  We are awaiting labs from this morning  Her potassium last night was 4 2  2  Non-anion gap metabolic acidosis:  She has had no diarrhea  Will increase sodium bicarbonate to 1300 b i d  3  Weakness:  Resolved  Due to hypokalemia    4  History of gastric bypass:  She has no other malabsorptive issues    Addendum 910: morning BMP demonstrates a K of 3 5 - will order additional K -riders      Follow up reason for today's visit:  Hypokalemia    Hypokalemia    Patient Active Problem List   Diagnosis    Hypokalemia    History of gastric bypass    Migraine without aura and without status migrainosus, not intractable    Left-sided weakness    Hypophosphatemia    Anemia    Vitamin D deficiency    Weakness of both lower extremities    Weakness of both upper extremities    Elevated CPK    Vitamin B12 deficiency    Cervical lymphadenopathy    Fatigue    Paresthesia of both lower extremities    Paresthesias    B12 deficiency    Gastric bypass status for obesity    GERD (gastroesophageal reflux disease)    Migraine    WAGNER (obstructive sleep apnea)  Other intestinal malabsorption    Acute kidney injury ([address] )    Right ovarian cyst    Chest pain    Intractable nausea and vomiting    Vertigo    Stress fracture of right foot with routine healing    Normal anion gap metabolic acidosis    Middle ear effusion         Subjective:   Feels well today denies headaches dizziness chest pain shortness of breath or abdominal pain nausea vomiting diarrhea  Able to eat she is urinating well    Objective:     Vitals: Blood pressure 103/63, pulse 83, temperature 98 7 °F (37 1 °C), resp  rate 18, height 5' 3" (1 6 m), weight 71 3 kg (157 lb 3 oz), SpO2 100 %  ,Body mass index is 27 84 kg/m²  Weight (last 2 days)     Date/Time   Weight    03/11/20 0600   71 3 (157 19)    03/10/20 0554   70 3 (154 98)    03/09/20 1120   69 8 (153 88)                Intake/Output Summary (Last 24 hours) at 3/11/2020 0839  Last data filed at 3/11/2020 0601  Gross per 24 hour   Intake 3648 75 ml   Output --   Net 3648 75 ml     I/O last 3 completed shifts:   In: 4707 5 [P O :1560; I V :3097 5; IV Piggyback:50]  Out: 2 [Urine:2]         Physical Exam: /63   Pulse 83   Temp 98 7 °F (37 1 °C)   Resp 18   Ht 5' 3" (1 6 m)   Wt 71 3 kg (157 lb 3 oz)   SpO2 100%   BMI 27 84 kg/m²     General Appearance:    Alert, cooperative, no distress, appears stated age   Head:    Normocephalic, without obvious abnormality, atraumatic   Eyes:    Conjunctiva/corneas clear   Ears:    Normal external ears   Nose:   Nares normal, septum midline, mucosa normal, no drainage    or sinus tenderness   Throat:   Lips, mucosa, and tongue normal; teeth and gums normal   Neck:   Supple, symmetrical, trachea midline, no adenopathy;        thyroid:  No enlargement/tenderness/nodules; no carotid    bruit or JVD   Back:     Symmetric, no curvature, ROM normal, no CVA tenderness   Lungs:     Clear to auscultation bilaterally, respirations unlabored   Chest wall:    No tenderness or deformity   Heart:    Regular rate and rhythm, S1 and S2 normal, no murmur, rub   or gallop   Abdomen:     Soft, non-tender, bowel sounds active   Extremities:   Extremities normal, atraumatic, no cyanosis or edema   Skin:   Skin color, texture, turgor normal, no rashes or lesions   Lymph nodes:   Cervical normal   Neurologic:   CNII-XII intact            Lab, Imaging and other studies: I have personally reviewed pertinent labs  CBC:   Lab Results   Component Value Date    WBC 4 54 03/11/2020    HGB 10 4 (L) 03/11/2020    HCT 33 7 (L) 03/11/2020    MCV 96 03/11/2020     03/11/2020    MCH 29 6 03/11/2020    MCHC 30 9 (L) 03/11/2020    RDW 15 0 03/11/2020    MPV 11 4 03/11/2020    NRBC 0 03/11/2020     CMP:   Lab Results   Component Value Date    K 4 2 03/10/2020     (H) 03/10/2020    CO2 17 (L) 03/10/2020    BUN 9 03/10/2020    CREATININE 0 80 03/10/2020    CALCIUM 7 7 (L) 03/10/2020    EGFR 95 03/10/2020         Results from last 7 days   Lab Units 03/10/20  1916 03/10/20  0550 03/09/20  0912   POTASSIUM mmol/L 4 2 3 0* 2 4*   CHLORIDE mmol/L 114* 110* 103   CO2 mmol/L 17* 19* 20*   BUN mg/dL 9 11 11   CREATININE mg/dL 0 80 0 84 1 05   CALCIUM mg/dL 7 7* 7 8* 8 3         Phosphorus:   Lab Results   Component Value Date    PHOS 2 0 (L) 03/11/2020     Magnesium:   Lab Results   Component Value Date    MG 2 0 03/11/2020     Urinalysis: No results found for: COLORU, CLARITYU, SPECGRAV, PHUR, LEUKOCYTESUR, NITRITE, PROTEINUA, GLUCOSEU, KETONESU, BILIRUBINUR, BLOODU  Ionized Calcium: No results found for: CAION  Coagulation: No results found for: PT, INR, APTT  Troponin: No results found for: TROPONINI  ABG: No results found for: PHART, TWS9XNS, PO2ART, IOT6BTC, B4RZXFWQ, BEART, SOURCE  Radiology review:     IMAGING  No results found      Current Facility-Administered Medications   Medication Dose Route Frequency    acetaminophen (TYLENOL) tablet 650 mg  650 mg Oral Q6H PRN    AMILoride tablet 5 mg  5 mg Oral Daily    amitriptyline (ELAVIL) tablet 50 mg  50 mg Oral HS    enoxaparin (LOVENOX) subcutaneous injection 40 mg  40 mg Subcutaneous Q24H    meclizine (ANTIVERT) tablet 25 mg  25 mg Oral HS    multi-electrolyte (PLASMALYTE-A/ISOLYTE-S PH 7 4) IV solution  75 mL/hr Intravenous Continuous    multivitamin stress formula tablet 1 tablet  1 tablet Oral Daily With Dinner    ondansetron (ZOFRAN) injection 4 mg  4 mg Intravenous Q6H PRN    potassium chloride 10 % oral solution 40 mEq  40 mEq Oral 5x Daily    sodium bicarbonate tablet 1,300 mg  1,300 mg Oral BID after meals    SUMAtriptan (IMITREX) tablet 100 mg  100 mg Oral Once PRN    thiamine (VITAMIN B1) tablet 100 mg  100 mg Oral Daily    topiramate (TOPAMAX) tablet 100 mg  100 mg Oral BID    zinc sulfate (ZINCATE) capsule 220 mg  220 mg Oral Daily     Medications Discontinued During This Encounter   Medication Reason    acetaminophen (TYLENOL) tablet 650 mg     sodium bicarbonate tablet 650 mg        Gumaro Stuart MD      This progress note was produced in part using a dictation device which may document imprecise wording from author's original intent

## 2020-03-11 NOTE — PROGRESS NOTES
Progress Note - Tiffany Marks 1985, 38 y o  female MRN: [id number]    Unit/Bed#: 403-01 Encounter: [id number]    Primary Care Provider: Americo Brantley DO   Date and time admitted to hospital: 3/9/2020 11:16 AM        * Hypokalemia  Assessment & Plan  · Still with symptomatic hypokalemia causing paresthesias  · The patient was seen in consultation by Nephrology  · Give potassium chloride IV every 2 hours x 3 doses per Nephrology  · Give potassium phosphate 12 mmol Iv x 1 dose  · Continue potassium chloride 40 meQ PO 5 times per day  · Increase amiloride to 10 mg PO Qdaily per Nephrology  · Follow the potassium level    Normal anion gap metabolic acidosis  Assessment & Plan  · Increase sodium bicarbonate to 1300 mg PO BID with meals per Nephrology  · Follow the sodium bicarbonate level    Vitamin D deficiency  Assessment & Plan  · Continue ergocalciferol supplementation upon discharge    Anemia  Assessment & Plan  · Check an iron panel, vitamin B12, and folate level  · Follow the CBC  · Transfuse for a hemoglobin less than 7 g/dl    Hypophosphatemia  Assessment & Plan  · Give potassium phosphate 12 mmol IV x 1 dose  · Follow the phosphorus level    History of gastric bypass  Assessment & Plan  · With chronic malabsorption with recurrent hospital admissions for acute on chronic hypokalemia  · Outpatient follow-up with Bariatric Surgery      VTE Pharmacologic Prophylaxis:   Pharmacologic: Enoxaparin (Lovenox)  Mechanical VTE Prophylaxis in Place: Yes    Patient Centered Rounds: I have performed bedside rounds with nursing staff today  Time Spent for Care: 30 minutes  More than 50% of total time spent on counseling and coordination of care as described above  Current Length of Stay: 2 day(s)    Current Patient Status: Inpatient   Certification Statement: The patient will continue to require additional inpatient hospital stay due to the need for IV potassium replacement therapy      Code Status: Level 1 - Full Code      Subjective: The patient was seen and examined  The patient is experiencing paresthesias of the bilateral upper extremities this morning  No chest pain  No shortness of breath  No abdominal pain  No nausea or vomiting  Objective:     Vitals:   Temp (24hrs), Av 3 °F (36 8 °C), Min:97 5 °F (36 4 °C), Max:98 7 °F (37 1 °C)    Temp:  [97 5 °F (36 4 °C)-98 7 °F (37 1 °C)] 98 7 °F (37 1 °C)  HR:  [78-91] 83  Resp:  [18] 18  BP: (102-111)/(58-64) 103/63  SpO2:  [99 %-100 %] 100 %  Body mass index is 27 84 kg/m²  Input and Output Summary (last 24 hours): Intake/Output Summary (Last 24 hours) at 3/11/2020 1114  Last data filed at 3/11/2020 0900  Gross per 24 hour   Intake 3768 75 ml   Output --   Net 3768 75 ml       Physical Exam:     Physical Exam  General:  NAD, follows commands  HEENT:  NC/AT, mucous membranes moist  Neck:  Supple, No JVP elevation  CV:  + S1, + S2, RRR  Pulm:  Lung fields are CTA bilaterally  Abd:  Soft, Non-tender, Non-distended  Ext:  No clubbing/cyanosis/edema  Skin:  No rashes  Neuro:  Awake, alert, oriented  Psych:  Normal mood and affect      Additional Data:    Labs:    Results from last 7 days   Lab Units 20  0606   WBC Thousand/uL 4 54   HEMOGLOBIN g/dL 10 4*   HEMATOCRIT % 33 7*   PLATELETS Thousands/uL 236   NEUTROS PCT % 42*   LYMPHS PCT % 45*   MONOS PCT % 8   EOS PCT % 4     Results from last 7 days   Lab Units 20  0606   SODIUM mmol/L 140   POTASSIUM mmol/L 3 5   CHLORIDE mmol/L 112*   CO2 mmol/L 17*   BUN mg/dL 8   CREATININE mg/dL 0 78   ANION GAP mmol/L 11   CALCIUM mg/dL 7 7*   ALBUMIN g/dL 2 7*   TOTAL BILIRUBIN mg/dL 0 10*   ALK PHOS U/L 41*   ALT U/L 18   AST U/L 14   GLUCOSE RANDOM mg/dL 82                           * I Have Reviewed All Lab Data Listed Above  * Additional Pertinent Lab Tests Reviewed:  [facility] Reviewed      Recent Cultures (last 7 days):           Last 24 Hours Medication List: Current Facility-Administered Medications:  acetaminophen 650 mg Oral Q6H PRN Santiago Montana DO    [START ON 3/12/2020] AMILoride 10 mg Oral Daily Petar Araujo MD    AMILoride 5 mg Oral Once Petar Araujo MD    amitriptyline 50 mg Oral HS Solo Jacome MD    enoxaparin 40 mg Subcutaneous Q24H Santiago Montana DO    meclizine 25 mg Oral HS Solo Jacome MD    multi-electrolyte 75 mL/hr Intravenous Continuous Solo Jacome MD Last Rate: 75 mL/hr (03/10/20 2053)   multivitamin stress formula 1 tablet Oral Daily With Yvonne Medina MD    ondansetron 4 mg Intravenous Q6H PRN Solo Jacome MD    potassium chloride 40 mEq Oral 5x Daily Solo Jacome MD    potassium chloride 20 mEq Intravenous Q2H Petar Araujo MD Last Rate: 20 mEq (03/11/20 0936)   potassium phosphate 12 mmol Intravenous Once Santiago Montana DO    sodium bicarbonate 1,300 mg Oral BID after meals Petar Araujo MD    SUMAtriptan 100 mg Oral Once PRN Solo Jacome MD    thiamine 100 mg Oral Daily Solo Jacome MD    topiramate 100 mg Oral BID Solo Jacome MD    zinc sulfate 220 mg Oral Daily Solo Jacome MD         Today, Patient Was Seen By: Santiago Montana DO    ** Please Note: Dictation voice to text software may have been used in the creation of this document   **

## 2020-03-11 NOTE — ASSESSMENT & PLAN NOTE
· Still with symptomatic hypokalemia causing paresthesias  · The patient was seen in consultation by Nephrology  · Give potassium chloride IV every 2 hours x 3 doses per Nephrology  · Give potassium phosphate 12 mmol Iv x 1 dose  · Continue potassium chloride 40 meQ PO 5 times per day  · Increase amiloride to 10 mg PO Qdaily per Nephrology  · Follow the potassium level

## 2020-03-12 ENCOUNTER — HOSPITAL ENCOUNTER (OUTPATIENT)
Dept: INFUSION CENTER | Facility: HOSPITAL | Age: 35
End: 2020-03-12

## 2020-03-12 VITALS
WEIGHT: 157.19 LBS | DIASTOLIC BLOOD PRESSURE: 65 MMHG | HEART RATE: 70 BPM | HEIGHT: 63 IN | RESPIRATION RATE: 16 BRPM | BODY MASS INDEX: 27.85 KG/M2 | SYSTOLIC BLOOD PRESSURE: 104 MMHG | TEMPERATURE: 98 F | OXYGEN SATURATION: 100 %

## 2020-03-12 LAB
ALBUMIN SERPL BCP-MCNC: 2.8 G/DL (ref 3.5–5)
ALP SERPL-CCNC: 39 U/L (ref 46–116)
ALT SERPL W P-5'-P-CCNC: 20 U/L (ref 12–78)
ANION GAP SERPL CALCULATED.3IONS-SCNC: 8 MMOL/L (ref 4–13)
AST SERPL W P-5'-P-CCNC: 17 U/L (ref 5–45)
BASOPHILS # BLD AUTO: 0.06 THOUSANDS/ΜL (ref 0–0.1)
BASOPHILS NFR BLD AUTO: 1 % (ref 0–1)
BILIRUB SERPL-MCNC: 0.1 MG/DL (ref 0.2–1)
BUN SERPL-MCNC: 6 MG/DL (ref 5–25)
CALCIUM SERPL-MCNC: 7.7 MG/DL (ref 8.3–10.1)
CHLORIDE SERPL-SCNC: 113 MMOL/L (ref 100–108)
CO2 SERPL-SCNC: 17 MMOL/L (ref 21–32)
CREAT SERPL-MCNC: 0.69 MG/DL (ref 0.6–1.3)
EOSINOPHIL # BLD AUTO: 0.24 THOUSAND/ΜL (ref 0–0.61)
EOSINOPHIL NFR BLD AUTO: 5 % (ref 0–6)
ERYTHROCYTE [DISTWIDTH] IN BLOOD BY AUTOMATED COUNT: 14.9 % (ref 11.6–15.1)
GFR SERPL CREATININE-BSD FRML MDRD: 112 ML/MIN/1.73SQ M
GLUCOSE SERPL-MCNC: 75 MG/DL (ref 65–140)
HCT VFR BLD AUTO: 34.1 % (ref 34.8–46.1)
HGB BLD-MCNC: 10.5 G/DL (ref 11.5–15.4)
IMM GRANULOCYTES # BLD AUTO: 0.01 THOUSAND/UL (ref 0–0.2)
IMM GRANULOCYTES NFR BLD AUTO: 0 % (ref 0–2)
LYMPHOCYTES # BLD AUTO: 2.69 THOUSANDS/ΜL (ref 0.6–4.47)
LYMPHOCYTES NFR BLD AUTO: 51 % (ref 14–44)
MAGNESIUM SERPL-MCNC: 2 MG/DL (ref 1.6–2.6)
MCH RBC QN AUTO: 29.7 PG (ref 26.8–34.3)
MCHC RBC AUTO-ENTMCNC: 30.8 G/DL (ref 31.4–37.4)
MCV RBC AUTO: 96 FL (ref 82–98)
MONOCYTES # BLD AUTO: 0.37 THOUSAND/ΜL (ref 0.17–1.22)
MONOCYTES NFR BLD AUTO: 7 % (ref 4–12)
NEUTROPHILS # BLD AUTO: 1.93 THOUSANDS/ΜL (ref 1.85–7.62)
NEUTS SEG NFR BLD AUTO: 36 % (ref 43–75)
NRBC BLD AUTO-RTO: 0 /100 WBCS
PHOSPHATE SERPL-MCNC: 2.5 MG/DL (ref 2.7–4.5)
PLATELET # BLD AUTO: 239 THOUSANDS/UL (ref 149–390)
PMV BLD AUTO: 11 FL (ref 8.9–12.7)
POTASSIUM SERPL-SCNC: 4.1 MMOL/L (ref 3.5–5.3)
PROCALCITONIN SERPL-MCNC: <0.05 NG/ML
PROT SERPL-MCNC: 5.8 G/DL (ref 6.4–8.2)
RBC # BLD AUTO: 3.54 MILLION/UL (ref 3.81–5.12)
SODIUM SERPL-SCNC: 138 MMOL/L (ref 136–145)
WBC # BLD AUTO: 5.3 THOUSAND/UL (ref 4.31–10.16)

## 2020-03-12 PROCEDURE — 99239 HOSP IP/OBS DSCHRG MGMT >30: CPT | Performed by: INTERNAL MEDICINE

## 2020-03-12 PROCEDURE — 85025 COMPLETE CBC W/AUTO DIFF WBC: CPT | Performed by: INTERNAL MEDICINE

## 2020-03-12 PROCEDURE — 83735 ASSAY OF MAGNESIUM: CPT | Performed by: INTERNAL MEDICINE

## 2020-03-12 PROCEDURE — 84100 ASSAY OF PHOSPHORUS: CPT | Performed by: INTERNAL MEDICINE

## 2020-03-12 PROCEDURE — 84145 PROCALCITONIN (PCT): CPT | Performed by: INTERNAL MEDICINE

## 2020-03-12 PROCEDURE — 80053 COMPREHEN METABOLIC PANEL: CPT | Performed by: INTERNAL MEDICINE

## 2020-03-12 RX ORDER — SODIUM BICARBONATE 650 MG/1
650 TABLET ORAL
Qty: 10 TABLET | Refills: 0 | Status: SHIPPED | OUTPATIENT
Start: 2020-03-12 | End: 2020-03-17 | Stop reason: ALTCHOICE

## 2020-03-12 RX ORDER — POTASSIUM CHLORIDE 14.9 MG/ML
20 INJECTION INTRAVENOUS
Status: COMPLETED | OUTPATIENT
Start: 2020-03-12 | End: 2020-03-12

## 2020-03-12 RX ADMIN — POTASSIUM CHLORIDE 20 MEQ: 14.9 INJECTION, SOLUTION INTRAVENOUS at 09:04

## 2020-03-12 RX ADMIN — POTASSIUM CHLORIDE 20 MEQ: 14.9 INJECTION, SOLUTION INTRAVENOUS at 11:22

## 2020-03-12 RX ADMIN — TOPIRAMATE 100 MG: 100 TABLET, FILM COATED ORAL at 08:01

## 2020-03-12 RX ADMIN — ZINC SULFATE 220 MG (50 MG) CAPSULE 220 MG: CAPSULE at 08:01

## 2020-03-12 RX ADMIN — SODIUM CHLORIDE, SODIUM GLUCONATE, SODIUM ACETATE, POTASSIUM CHLORIDE AND MAGNESIUM CHLORIDE 75 ML/HR: 526; 502; 368; 37; 30 INJECTION, SOLUTION INTRAVENOUS at 01:44

## 2020-03-12 RX ADMIN — Medication 100 MG: at 08:01

## 2020-03-12 RX ADMIN — AMILORIDE HYDROCLORIDE 10 MG: 5 TABLET ORAL at 08:01

## 2020-03-12 RX ADMIN — SODIUM BICARBONATE 650 MG TABLET 1300 MG: at 08:01

## 2020-03-12 NOTE — PLAN OF CARE
Problem: INFECTION - ADULT  Goal: Absence or prevention of progression during hospitalization  Description  INTERVENTIONS:  - Assess and monitor for signs and symptoms of infection  - Monitor lab/diagnostic results  - Monitor all insertion sites, i e  indwelling lines, tubes, and drains  - Administer medications as ordered  - Instruct and encourage patient and family to use good hand hygiene technique  - Identify and instruct in appropriate isolation precautions for identified infection/condition   Outcome: Progressing     Problem: SAFETY ADULT  Goal: Patient will remain free of falls  Description  INTERVENTIONS:  - Assess patient frequently for physical needs  -  Identify cognitive and physical deficits and behaviors that affect risk of falls    -  [place] fall precautions as indicated by assessment   - Educate patient/family on patient safety including physical limitations  - Instruct patient to call for assistance with activity based on assessment  - Modify environment to reduce risk of injury  - Consider OT/PT consult to assist with strengthening/mobility  Outcome: Progressing  Goal: Maintain or return to baseline ADL function  Description  INTERVENTIONS:  -  Assess patient's ability to carry out ADLs; assess patient's baseline for ADL function and identify physical deficits which impact ability to perform ADLs (bathing, care of mouth/teeth, toileting, grooming, dressing, etc )  - Assess/evaluate cause of self-care deficits   - Assess range of motion  - Assess patient's mobility; develop plan if impaired  - Assess patient's need for assistive devices and provide as appropriate  - Encourage maximum independence but intervene and supervise when necessary  - Involve family in performance of ADLs  - Assess for home care needs following discharge   - Consider OT consult to assist with ADL evaluation and planning for discharge  - Provide patient education as appropriate  Outcome: Progressing  Goal: Maintain or return mobility status to optimal level  Description  INTERVENTIONS:  - Assess patient's baseline mobility status (ambulation, transfers, stairs, etc )    - Identify cognitive and physical deficits and behaviors that affect mobility  - Identify mobility aids required to assist with transfers and/or ambulation (gait belt, sit-to-stand, lift, walker, cane, etc )  - [place] fall precautions as indicated by assessment  - Record patient progress and toleration of activity level on Mobility SBAR; progress patient to next Phase/Stage  - Instruct patient to call for assistance with activity based on assessment  - Consider rehabilitation consult to assist with strengthening/weightbearing, etc   Outcome: Progressing     Problem: DISCHARGE PLANNING  Goal: Discharge to home or other facility with appropriate resources  Description  INTERVENTIONS:  - Identify barriers to discharge w/patient and caregiver  - Arrange for needed discharge resources and transportation as appropriate  - Identify discharge learning needs (meds, wound care, etc )  - Refer to Case Management Department for coordinating discharge planning if the patient needs post-hospital services based on physician/advanced practitioner order or complex needs related to functional status, cognitive ability, or social support system   Outcome: Progressing     Problem: Knowledge Deficit  Goal: Patient/family/caregiver demonstrates understanding of disease process, treatment plan, medications, and discharge instructions  Description  Complete learning assessment and assess knowledge base    Interventions:  - Provide teaching at level of understanding  - Provide teaching via preferred learning methods  Outcome: Progressing     Problem: METABOLIC, FLUID AND ELECTROLYTES - ADULT  Goal: Electrolytes maintained within normal limits  Description  INTERVENTIONS:  - Monitor labs and assess patient for signs and symptoms of electrolyte imbalances  - Administer electrolyte replacement as ordered  - Monitor response to electrolyte replacements, including repeat lab results as appropriate  - Instruct patient on fluid and nutrition as appropriate  Outcome: Progressing

## 2020-03-12 NOTE — NURSING NOTE
AVS printed and reviewed with patient  Patient verbalized understanding    No home care  Ordered on d/c

## 2020-03-12 NOTE — SOCIAL WORK
Discussed with patient preferences on discharge;understanding how to manage health at home; purpose of taking medications; importance of follow up care/appointments; and symptoms to watch out for once discharged home  Pt is being dc'd home on this date with OP follow with Endocrinology on 3/13/20 and with resumption of OP infusions at [facility] infusion center

## 2020-03-12 NOTE — PLAN OF CARE
Problem: INFECTION - ADULT  Goal: Absence or prevention of progression during hospitalization  Description  INTERVENTIONS:  - Assess and monitor for signs and symptoms of infection  - Monitor lab/diagnostic results  - Monitor all insertion sites, i e  indwelling lines, tubes, and drains  - Administer medications as ordered  - Instruct and encourage patient and family to use good hand hygiene technique  - Identify and instruct in appropriate isolation precautions for identified infection/condition   3/12/2020 1310 by Sammi Morales RN  Outcome: Completed  3/12/2020 1044 by Sammi Morales RN  Outcome: Progressing     Problem: SAFETY ADULT  Goal: Patient will remain free of falls  Description  INTERVENTIONS:  - Assess patient frequently for physical needs  -  Identify cognitive and physical deficits and behaviors that affect risk of falls    -  [place] fall precautions as indicated by assessment   - Educate patient/family on patient safety including physical limitations  - Instruct patient to call for assistance with activity based on assessment  - Modify environment to reduce risk of injury  - Consider OT/PT consult to assist with strengthening/mobility  3/12/2020 1310 by Sammi Morales RN  Outcome: Completed  3/12/2020 1044 by Sammi Morales RN  Outcome: Progressing  Goal: Maintain or return to baseline ADL function  Description  INTERVENTIONS:  -  Assess patient's ability to carry out ADLs; assess patient's baseline for ADL function and identify physical deficits which impact ability to perform ADLs (bathing, care of mouth/teeth, toileting, grooming, dressing, etc )  - Assess/evaluate cause of self-care deficits   - Assess range of motion  - Assess patient's mobility; develop plan if impaired  - Assess patient's need for assistive devices and provide as appropriate  - Encourage maximum independence but intervene and supervise when necessary  - Involve family in performance of ADLs  - Assess for home care needs following discharge   - Consider OT consult to assist with ADL evaluation and planning for discharge  - Provide patient education as appropriate  3/12/2020 1310 by Luis M Nunez RN  Outcome: Completed  3/12/2020 1044 by Luis M Nunez RN  Outcome: Progressing  Goal: Maintain or return mobility status to optimal level  Description  INTERVENTIONS:  - Assess patient's baseline mobility status (ambulation, transfers, stairs, etc )    - Identify cognitive and physical deficits and behaviors that affect mobility  - Identify mobility aids required to assist with transfers and/or ambulation (gait belt, sit-to-stand, lift, walker, cane, etc )  - [place] fall precautions as indicated by assessment  - Record patient progress and toleration of activity level on Mobility SBAR; progress patient to next Phase/Stage  - Instruct patient to call for assistance with activity based on assessment  - Consider rehabilitation consult to assist with strengthening/weightbearing, etc   3/12/2020 1310 by Luis M Nunez RN  Outcome: Completed  3/12/2020 1044 by Luis M Nunez RN  Outcome: Progressing     Problem: DISCHARGE PLANNING  Goal: Discharge to home or other facility with appropriate resources  Description  INTERVENTIONS:  - Identify barriers to discharge w/patient and caregiver  - Arrange for needed discharge resources and transportation as appropriate  - Identify discharge learning needs (meds, wound care, etc )  - Refer to Case Management Department for coordinating discharge planning if the patient needs post-hospital services based on physician/advanced practitioner order or complex needs related to functional status, cognitive ability, or social support system   3/12/2020 1310 by Luis M Nunez RN  Outcome: Completed  3/12/2020 1044 by Luis M Nunez RN  Outcome: Progressing     Problem: Knowledge Deficit  Goal: Patient/family/caregiver demonstrates understanding of disease process, treatment plan, medications, and discharge instructions  Description  Complete learning assessment and assess knowledge base    Interventions:  - Provide teaching at level of understanding  - Provide teaching via preferred learning methods  3/12/2020 1310 by Mikhail Yap RN  Outcome: Completed  3/12/2020 1044 by Mikhail Yap RN  Outcome: Progressing     Problem: METABOLIC, FLUID AND ELECTROLYTES - ADULT  Goal: Electrolytes maintained within normal limits  Description  INTERVENTIONS:  - Monitor labs and assess patient for signs and symptoms of electrolyte imbalances  - Administer electrolyte replacement as ordered  - Monitor response to electrolyte replacements, including repeat lab results as appropriate  - Instruct patient on fluid and nutrition as appropriate  3/12/2020 1310 by Mikhail Yap RN  Outcome: Completed  3/12/2020 1044 by Mikhail Yap RN  Outcome: Progressing

## 2020-03-13 NOTE — DISCHARGE SUMMARY
Discharge- Taylor Cornelius 1985, 38 y o  female MRN: [id number]    Unit/Bed#: 403-01 Encounter: [id number]    Primary Care Provider: Ramsey Aguilar DO   Date and time admitted to hospital: 3/9/2020 11:16 AM        * Hypokalemia  Assessment & Plan  · The extremity paresthesias resolved with resolution of the hyperkalemia  · The patient was seen in consultation by Nephrology  · Received aggressive replacement with IV potassium chloride during the hospitalization  · Continue potassium chloride 40 meQ PO 5 times per day upon discharge  · Continue amiloride  · Continue outpatient IV potassium chloride infusions per Nephrology  · Follow the potassium level after discharge with Nephrology    Normal anion gap metabolic acidosis  Assessment & Plan  · Utilize sodium bicarbonate 650 mg PO BID with meals for 5 days upon discharge  · Follow the sodium bicarbonate level after discharge with Nephrology    Vitamin B12 deficiency  Assessment & Plan  · Vitamin B12 replacement therapy per Nephrology    Vitamin D deficiency  Assessment & Plan  · Continue ergocalciferol supplementation upon discharge    Anemia  Assessment & Plan  Results for Lui Stanley (MRN [id number]) as of 3/12/2020 22:08   Ref   Range 3/11/2020 06:06   Iron Latest Ref Range: 50 - 170 ug/dL 48 (L)   Ferritin Latest Ref Range: 8 - 388 ng/mL 10   Iron Saturation Latest Units: % 18   TIBC Latest Ref Range: 250 - 450 ug/dL 274   Folate Latest Ref Range: 3 1 - 17 5 ng/mL 17 4   Vitamin B-12 Latest Ref Range: 100 - 900 pg/mL 91 (L)     · Follow the CBC after discharge with Nephrology  · Vitamin B12 supplementation per Nephrology  · Transfuse for a hemoglobin less than 7 g/dl    Hypophosphatemia  Assessment & Plan  · Received potassium phosphate during the hospitalization  · Follow the phosphorus level after discharge with Nephrology    History of gastric bypass  Assessment & Plan  · With chronic malabsorption with recurrent hospital admissions for acute on chronic hypokalemia  · Outpatient follow-up with Bariatric Surgery        Discharging Physician / Practitioner: Elda Correa DO  PCP: Tamsen Saint, DO  Admission Date:   Admission Orders (From admission, onward)     Ordered        03/10/20 1411  Inpatient Admission  Once         03/09/20 1219  Place in Observation  Once                   Discharge Date: 03/12/20    Consultations During Hospital Stay:  · Nephrology    Procedures Performed:   · None    Incidental Findings:   · None     Test Results Pending at Discharge (will require follow up): · None      Complications:  None    Reason for Admission:  Symptomatic hypokalemia    Hospital Course: Braden Bean is a 38 y o  female patient who originally presented to the hospital on 3/9/2020 due to symptomatic hypokalemia  Please see above list of diagnoses and related plan for additional information  Condition at Discharge: good     Discharge Day Visit / Exam:     Subjective: The patient was seen and examined  The patient is doing better  The extremity paresthesias have resolved  No chest pain  No shortness of breath  No abdominal pain  No nausea or vomiting      Vitals: Blood Pressure: 104/65 (03/12/20 0710)  Pulse: 70 (03/12/20 0710)  Temperature: 98 °F (36 7 °C) (03/12/20 0710)  Temp Source: Oral (03/11/20 2315)  Respirations: 16 (03/12/20 0710)  Height: 5' 3" (160 cm) (03/09/20 1120)  Weight - Scale: 71 3 kg (157 lb 3 oz) (03/11/20 0600)  SpO2: 100 % (03/12/20 0710)  Exam:   Physical Exam  General:  NAD, follows commands  HEENT:  NC/AT, mucous membranes moist  Neck:  Supple, No JVP elevation  CV:  + S1, + S2, RRR  Pulm:  Lung fields are CTA bilaterally  Abd:  Soft, Non-tender, Non-distended  Ext:  No clubbing/cyanosis/edema  Skin:  No rashes  Neuro:  Awake, alert, oriented  Psych:  Normal mood and affect    Discharge instructions/Information to patient and family:   See after visit summary for information provided to patient and family  Provisions for Follow-Up Care:  See after visit summary for information related to follow-up care and any pertinent home health orders  Disposition:     Home    Planned Readmission:  No     Discharge Statement:  I spent 35 minutes discharging the patient  This time was spent on the day of discharge  I had direct contact with the patient on the day of discharge  Greater than 50% of the total time was spent examining patient, answering all patient questions, arranging and discussing plan of care with patient as well as directly providing post-discharge instructions  Additional time then spent on discharge activities  Discharge Medications:  See after visit summary for reconciled discharge medications provided to patient and family        ** Please Note: This note has been constructed using a voice recognition system **

## 2020-03-13 NOTE — ASSESSMENT & PLAN NOTE
Results for Eleuterio Greenfield (MRN [id number]) as of 3/12/2020 22:08   Ref   Range 3/11/2020 06:06   Iron Latest Ref Range: 50 - 170 ug/dL 48 (L)   Ferritin Latest Ref Range: 8 - 388 ng/mL 10   Iron Saturation Latest Units: % 18   TIBC Latest Ref Range: 250 - 450 ug/dL 274   Folate Latest Ref Range: 3 1 - 17 5 ng/mL 17 4   Vitamin B-12 Latest Ref Range: 100 - 900 pg/mL 91 (L)     · Follow the CBC after discharge with Nephrology  · Vitamin B12 supplementation per Nephrology  · Transfuse for a hemoglobin less than 7 g/dl

## 2020-03-13 NOTE — ASSESSMENT & PLAN NOTE
· The extremity paresthesias resolved with resolution of the hyperkalemia  · The patient was seen in consultation by Nephrology  · Received aggressive replacement with IV potassium chloride during the hospitalization  · Continue potassium chloride 40 meQ PO 5 times per day upon discharge  · Continue amiloride  · Continue outpatient IV potassium chloride infusions per Nephrology  · Follow the potassium level after discharge with Nephrology

## 2020-03-13 NOTE — ASSESSMENT & PLAN NOTE
· Received potassium phosphate during the hospitalization  · Follow the phosphorus level after discharge with Nephrology

## 2020-03-13 NOTE — ASSESSMENT & PLAN NOTE
· Utilize sodium bicarbonate 650 mg PO BID with meals for 5 days upon discharge  · Follow the sodium bicarbonate level after discharge with Nephrology

## 2020-03-17 ENCOUNTER — HOSPITAL ENCOUNTER (OUTPATIENT)
Dept: INFUSION CENTER | Facility: HOSPITAL | Age: 35
Discharge: HOME | End: 2020-03-17
Payer: COMMERCIAL

## 2020-03-17 VITALS
SYSTOLIC BLOOD PRESSURE: 130 MMHG | TEMPERATURE: 98.8 F | HEART RATE: 104 BPM | OXYGEN SATURATION: 100 % | DIASTOLIC BLOOD PRESSURE: 82 MMHG | RESPIRATION RATE: 18 BRPM

## 2020-03-17 DIAGNOSIS — E87.6 HYPOKALEMIA: ICD-10-CM

## 2020-03-17 DIAGNOSIS — K90.89 OTHER INTESTINAL MALABSORPTION: Primary | ICD-10-CM

## 2020-03-17 LAB
ANION GAP SERPL CALCULATED.3IONS-SCNC: 10 MMOL/L (ref 4–13)
BUN SERPL-MCNC: 11 MG/DL (ref 5–25)
CALCIUM SERPL-MCNC: 9 MG/DL (ref 8.3–10.1)
CHLORIDE SERPL-SCNC: 104 MMOL/L (ref 100–108)
CO2 SERPL-SCNC: 22 MMOL/L (ref 21–32)
CREAT SERPL-MCNC: 1.09 MG/DL (ref 0.6–1.3)
GFR SERPL CREATININE-BSD FRML MDRD: 65 ML/MIN/1.73SQ M
GLUCOSE SERPL-MCNC: 74 MG/DL (ref 65–140)
POTASSIUM SERPL-SCNC: 3.7 MMOL/L (ref 3.5–5.3)
SODIUM SERPL-SCNC: 136 MMOL/L (ref 136–145)

## 2020-03-17 PROCEDURE — 80048 BASIC METABOLIC PNL TOTAL CA: CPT | Performed by: INTERNAL MEDICINE

## 2020-03-17 PROCEDURE — 96365 THER/PROPH/DIAG IV INF INIT: CPT

## 2020-03-17 PROCEDURE — 96366 THER/PROPH/DIAG IV INF ADDON: CPT

## 2020-03-17 RX ORDER — SODIUM CHLORIDE 9 MG/ML
20 INJECTION, SOLUTION INTRAVENOUS ONCE
Status: CANCELLED
Start: 2020-03-19

## 2020-03-17 RX ORDER — SODIUM CHLORIDE 9 MG/ML
20 INJECTION, SOLUTION INTRAVENOUS ONCE
Status: DISCONTINUED | OUTPATIENT
Start: 2020-03-17 | End: 2020-03-20 | Stop reason: HOSPADM

## 2020-03-17 RX ADMIN — POTASSIUM CHLORIDE: 2 INJECTION, SOLUTION, CONCENTRATE INTRAVENOUS at 09:59

## 2020-03-17 NOTE — PLAN OF CARE

## 2020-03-19 ENCOUNTER — HOSPITAL ENCOUNTER (OUTPATIENT)
Dept: INFUSION CENTER | Facility: HOSPITAL | Age: 35
Discharge: HOME | End: 2020-03-19
Payer: COMMERCIAL

## 2020-03-19 VITALS
OXYGEN SATURATION: 100 % | RESPIRATION RATE: 16 BRPM | HEART RATE: 88 BPM | SYSTOLIC BLOOD PRESSURE: 122 MMHG | TEMPERATURE: 98.1 F | DIASTOLIC BLOOD PRESSURE: 52 MMHG

## 2020-03-19 DIAGNOSIS — E87.6 HYPOKALEMIA: Primary | ICD-10-CM

## 2020-03-19 DIAGNOSIS — K90.89 OTHER INTESTINAL MALABSORPTION: ICD-10-CM

## 2020-03-19 LAB
ANION GAP SERPL CALCULATED.3IONS-SCNC: 13 MMOL/L (ref 4–13)
BUN SERPL-MCNC: 10 MG/DL (ref 5–25)
CALCIUM SERPL-MCNC: 8.6 MG/DL (ref 8.3–10.1)
CHLORIDE SERPL-SCNC: 106 MMOL/L (ref 100–108)
CO2 SERPL-SCNC: 18 MMOL/L (ref 21–32)
CREAT SERPL-MCNC: 0.88 MG/DL (ref 0.6–1.3)
GFR SERPL CREATININE-BSD FRML MDRD: 85 ML/MIN/1.73SQ M
GLUCOSE SERPL-MCNC: 75 MG/DL (ref 65–140)
POTASSIUM SERPL-SCNC: 4 MMOL/L (ref 3.5–5.3)
SODIUM SERPL-SCNC: 137 MMOL/L (ref 136–145)

## 2020-03-19 PROCEDURE — 80048 BASIC METABOLIC PNL TOTAL CA: CPT | Performed by: INTERNAL MEDICINE

## 2020-03-19 PROCEDURE — 96365 THER/PROPH/DIAG IV INF INIT: CPT

## 2020-03-19 PROCEDURE — 96366 THER/PROPH/DIAG IV INF ADDON: CPT

## 2020-03-19 RX ORDER — SODIUM CHLORIDE 9 MG/ML
20 INJECTION, SOLUTION INTRAVENOUS ONCE
Status: CANCELLED
Start: 2020-03-23

## 2020-03-19 RX ORDER — SODIUM CHLORIDE 9 MG/ML
20 INJECTION, SOLUTION INTRAVENOUS ONCE
Status: DISCONTINUED | OUTPATIENT
Start: 2020-03-19 | End: 2020-03-22 | Stop reason: HOSPADM

## 2020-03-19 RX ORDER — HEPARIN SODIUM (PORCINE) LOCK FLUSH IV SOLN 100 UNIT/ML 100 UNIT/ML
300 SOLUTION INTRAVENOUS ONCE
Status: CANCELLED
Start: 2020-03-23

## 2020-03-19 RX ORDER — HEPARIN SODIUM (PORCINE) LOCK FLUSH IV SOLN 100 UNIT/ML 100 UNIT/ML
300 SOLUTION INTRAVENOUS ONCE
Status: COMPLETED | OUTPATIENT
Start: 2020-03-19 | End: 2020-03-19

## 2020-03-19 RX ADMIN — HEPARIN 300 UNITS: 100 SYRINGE at 14:56

## 2020-03-19 RX ADMIN — POTASSIUM CHLORIDE: 2 INJECTION, SOLUTION, CONCENTRATE INTRAVENOUS at 09:54

## 2020-03-19 NOTE — PROGRESS NOTES
Blood drawn and taken to lab results of 4 0 reported to Dr Michael Bansal to confirm if he wants 40 or 60 mEq Dr Eric Law confirmed 60 mEq

## 2020-03-19 NOTE — PLAN OF CARE
Problem: INFECTION - ADULT  Goal: Absence or prevention of progression during hospitalization  Description  INTERVENTIONS:  - Assess and monitor for signs and symptoms of infection  - Monitor lab/diagnostic results  - Monitor all insertion sites, i e  indwelling lines, tubes, and drains  - Administer medications as ordered  - Instruct and encourage patient and family to use good hand hygiene technique  - Identify and instruct in appropriate isolation precautions for identified infection/condition   Outcome: Progressing  Goal: Absence of fever/infection during neutropenic period  Description  INTERVENTIONS:  - Monitor WBC    Outcome: Progressing

## 2020-03-19 NOTE — UTILIZATION REVIEW
Notification of Discharge  This is a Notification of Discharge from our facility [address]  Please be advised that this patient has been discharge from our facility  Below you will find the admission and discharge date and time including the patients disposition  PRESENTATION DATE: 3/9/2020 11:16 AM  OBS ADMISSION DATE: 3/9/2020  IP ADMISSION DATE: 3/9/20 1116   DISCHARGE DATE: 3/12/2020  1:20 PM  DISPOSITION: Home/Self Care Home/Self Care   Admission Orders listed below:  Admission Orders (From admission, onward)     Ordered        03/10/20 1411  Inpatient Admission  Once         03/09/20 1219  Place in Observation  Once                   Please contact the UR Department if additional information is required to close this patient's authorization/case  [facility]  Utilization Review Department  Main: [phone] x carefully listen to the prompts  All voicemails are confidential   [email or web address]  Send all requests for admission clinical reviews, approved or denied determinations and any other requests to dedicated fax number below belonging to the campus where the patient is receiving treatment   List of dedicated fax numbers:  [address] DENIALS (Administrative/Medical Necessity) [phone]   [address] [place] [address] (Maternity/NICU/Pediatrics) [phone]   Abiodun Mtzn [phone]     [address] [phone]   Kassy Velarde [phone]   [address] [facility] [address] [place] [address] [phone]   [facility]  [phone] [address] [facility] [address]  [address] [address] [address] [facility] [phone]

## 2020-03-23 ENCOUNTER — HOSPITAL ENCOUNTER (OUTPATIENT)
Dept: INFUSION CENTER | Facility: HOSPITAL | Age: 35
Discharge: HOME | End: 2020-03-23
Payer: COMMERCIAL

## 2020-03-23 VITALS
TEMPERATURE: 98.7 F | DIASTOLIC BLOOD PRESSURE: 51 MMHG | HEART RATE: 89 BPM | RESPIRATION RATE: 18 BRPM | OXYGEN SATURATION: 100 % | SYSTOLIC BLOOD PRESSURE: 107 MMHG

## 2020-03-23 DIAGNOSIS — E87.6 HYPOKALEMIA: Primary | ICD-10-CM

## 2020-03-23 DIAGNOSIS — K90.89 OTHER INTESTINAL MALABSORPTION: ICD-10-CM

## 2020-03-23 LAB
ANION GAP SERPL CALCULATED.3IONS-SCNC: 13 MMOL/L (ref 4–13)
BUN SERPL-MCNC: 15 MG/DL (ref 5–25)
CALCIUM SERPL-MCNC: 8.5 MG/DL (ref 8.3–10.1)
CHLORIDE SERPL-SCNC: 110 MMOL/L (ref 100–108)
CO2 SERPL-SCNC: 16 MMOL/L (ref 21–32)
CREAT SERPL-MCNC: 0.88 MG/DL (ref 0.6–1.3)
GFR SERPL CREATININE-BSD FRML MDRD: 85 ML/MIN/1.73SQ M
GLUCOSE SERPL-MCNC: 76 MG/DL (ref 65–140)
POTASSIUM SERPL-SCNC: 3.6 MMOL/L (ref 3.5–5.3)
SODIUM SERPL-SCNC: 139 MMOL/L (ref 136–145)

## 2020-03-23 PROCEDURE — 96365 THER/PROPH/DIAG IV INF INIT: CPT

## 2020-03-23 PROCEDURE — 80048 BASIC METABOLIC PNL TOTAL CA: CPT | Performed by: INTERNAL MEDICINE

## 2020-03-23 PROCEDURE — 96366 THER/PROPH/DIAG IV INF ADDON: CPT

## 2020-03-23 RX ORDER — HEPARIN SODIUM (PORCINE) LOCK FLUSH IV SOLN 100 UNIT/ML 100 UNIT/ML
300 SOLUTION INTRAVENOUS ONCE
Status: CANCELLED
Start: 2020-03-26

## 2020-03-23 RX ORDER — HEPARIN SODIUM (PORCINE) LOCK FLUSH IV SOLN 100 UNIT/ML 100 UNIT/ML
300 SOLUTION INTRAVENOUS ONCE
Status: COMPLETED | OUTPATIENT
Start: 2020-03-23 | End: 2020-03-23

## 2020-03-23 RX ORDER — SODIUM CHLORIDE 9 MG/ML
20 INJECTION, SOLUTION INTRAVENOUS ONCE
Status: CANCELLED
Start: 2020-03-26

## 2020-03-23 RX ORDER — SODIUM CHLORIDE 9 MG/ML
20 INJECTION, SOLUTION INTRAVENOUS ONCE
Status: DISCONTINUED | OUTPATIENT
Start: 2020-03-23 | End: 2020-03-26 | Stop reason: HOSPADM

## 2020-03-23 RX ADMIN — HEPARIN 300 UNITS: 100 SYRINGE at 15:01

## 2020-03-23 RX ADMIN — POTASSIUM CHLORIDE: 2 INJECTION, SOLUTION, CONCENTRATE INTRAVENOUS at 09:42

## 2020-03-23 NOTE — PROGRESS NOTES
Pt tolerated infusion well  Infusion stopped at 1500 at pt request  72ml remaining on infusion  D/c home in stable condition with f/u appts

## 2020-03-24 ENCOUNTER — PATIENT OUTREACH (OUTPATIENT)
Dept: CASE MANAGEMENT | Facility: OTHER | Age: 35
End: 2020-03-24

## 2020-03-24 ENCOUNTER — TELEMEDICINE (OUTPATIENT)
Dept: ENDOCRINOLOGY | Facility: CLINIC | Age: 35
End: 2020-03-24
Payer: COMMERCIAL

## 2020-03-24 DIAGNOSIS — E55.9 VITAMIN D DEFICIENCY: ICD-10-CM

## 2020-03-24 DIAGNOSIS — N95.1 MENOPAUSAL SYMPTOMS: ICD-10-CM

## 2020-03-24 DIAGNOSIS — Z98.84 HISTORY OF GASTRIC BYPASS: ICD-10-CM

## 2020-03-24 DIAGNOSIS — E87.6 HYPOKALEMIA: ICD-10-CM

## 2020-03-24 DIAGNOSIS — E55.9 VITAMIN D INSUFFICIENCY: Primary | ICD-10-CM

## 2020-03-24 PROCEDURE — 99214 OFFICE O/P EST MOD 30 MIN: CPT | Performed by: INTERNAL MEDICINE

## 2020-03-24 PROCEDURE — 1111F DSCHRG MED/CURRENT MED MERGE: CPT | Performed by: INTERNAL MEDICINE

## 2020-03-24 RX ORDER — ERGOCALCIFEROL (VITAMIN D2) 1250 MCG
50000 CAPSULE ORAL 2 TIMES WEEKLY
Qty: 60 CAPSULE | Refills: 5 | Status: SHIPPED | OUTPATIENT
Start: 2020-03-26 | End: 2021-09-17

## 2020-03-24 NOTE — SOCIAL WORK
Patient evaluated at bedside due to mild fever (100.5) and persistent tachycardia to 120s. Heart rate palpated at bedside to be 116bpm. No CP/SOB at this time. Has been using an incentive spirometer as per recommendation by the day team. She tolerated dinner and ate most of the meal. Has been feeling tired today so she did not get up to ambulate. Miralax helped to produce a small bowel movement. Patient occasionally feels chills throughout the day. No other complaints.     Vital Signs Last 24 Hours  T(C): 38.1 (03-24-20 @ 21:02), Max: 38.1 (03-24-20 @ 21:02)  HR: 123 (03-24-20 @ 21:02) (91 - 142)  BP: 105/65 (03-24-20 @ 21:02) (104/67 - 116/74)  RR: 19 (03-24-20 @ 21:02) (18 - 20)  SpO2: 96% (03-24-20 @ 21:02) (94% - 98%)    I&O's Summary    23 Mar 2020 07:01  -  24 Mar 2020 07:00  --------------------------------------------------------  IN: 260 mL / OUT: 0 mL / NET: 260 mL    24 Mar 2020 07:01  -  24 Mar 2020 21:48  --------------------------------------------------------  IN: 1520 mL / OUT: 200 mL / NET: 1320 mL      Physical Exam:  General: NAD  CV: tachycardic, regular rhythm  Lungs: CTA throughout all lung fields  Ext: No LE edema/tenderness      PLAN:   -Strict Is/Os  -Continue LR@125  -500cc NS bolus   -Tylenol for fever   -If patient's tachycardia worsens or she becomes dyspneic and/or develops symptoms, will perform CTA to r/o PE and r/o for COVID-19    D/w Dr. Nato Rojasta PGY-2 Pt was dc'd to home on this date with plans for outpatient follow up after dc

## 2020-03-24 NOTE — PROGRESS NOTES
Virtual Regular Visit    Problem List Items Addressed This Visit        Other    Hypokalemia    Relevant Orders    Cortisol- Lab Collect    Aldosterone/Renin Ratio    History of gastric bypass    Vitamin D deficiency      Other Visit Diagnoses     Vitamin D insufficiency    -  Primary    Relevant Medications    ergocalciferol (ERGOCALCIFEROL) 1 25 MG (84274 UT) capsule (Start on 3/26/2020)    Menopausal symptoms        Relevant Orders    Follicle stimulating hormone Lab Collect    Luteinizing hormone Lab Collect    Estrogens, total               Reason for visit is hypokalemia    Encounter provider Rylan Reveles MD    Provider located at [address] [place] [address] [facility] [address]      Recent Visits  No visits were found meeting these conditions  Showing recent visits within past 7 days and meeting all other requirements     Future Appointments  No visits were found meeting these conditions  Showing future appointments within next 150 days and meeting all other requirements        After connecting through [employer], the patient was identified by name and date of birth  Kathia Guzman was informed that this is a telemedicine visit and that the visit is being conducted through [employer] which may not be secure and therefore, might not be HIPAA-compliant  My office door was closed  No one else was in the room  She acknowledged consent and understanding of privacy and security of the video platform  The patient has agreed to participate and understands they can discontinue the visit at any time  Raul Yao is a 38 y o  female  With hx of gastric bypass and hypokalemia  Hypokalemia began in approx July 2018, and has been worsening  She has had several hospitalizations for this and has difficulty    Recently getting worse  BLANCHE suggested an endocrinology reevaluation  Saw endocrinology in 2018, but this was unremarkable    Currently, she liquid K takes 200meq per day, her infusions twice per week, sodium bicarb, and amiloride  She states she has not been losing in the urine, stool, or sweat  She is not currently vomiting  Oct 1, 2014, gastric bypass  Her surgeon was at [facility] and is no longer there  Lost 170# and has mainted her weight loss  She tries to remain active  There is no hx vomiting, dumping syndrome, and when she was reevaluated by Bariatrics she was told it was not related since it occurred 4 years after her surgery  Her diet is controlled  She makes own meals,  And continues taking vitamins (taking Centrum 65 and over, iron b complex, b 12 and zin)  Her outpatient albumin and protein are in the normal range  There is persistent vitamin D deficiency despite  [id number] weekly of D  She is s/p hysterctomy and unilateral oophorectomy  She is concerned about her estrogen status does not appear to have PMS symptoms  She did have a period of sweating and was offered HRT in the past            Past Medical History:   Diagnosis Date    H  pylori infection     Hypokalemia     Migraine     Renal disorder     Rhabdomyolysis        Past Surgical History:   Procedure Laterality Date    ABDOMINAL SURGERY      APPENDECTOMY       SECTION      CHOLECYSTECTOMY      COSMETIC SURGERY      FL GUIDED CENTRAL VENOUS ACCESS DEVICE INSERTION  2018    GASTRIC BYPASS      HYSTERECTOMY      TUNNELED VENOUS PORT PLACEMENT N/A 2018    Procedure: INSERTION VENOUS PORT (PORT-A-CATH);   Surgeon: Adrien Duff DO;  Location: [facility];  Service: General       Current Outpatient Medications   Medication Sig Dispense Refill    AMILoride 5 mg tablet Take 1 tablet (5 mg total) by mouth daily 30 tablet 0    amitriptyline (ELAVIL) 50 mg tablet Take 50 mg by mouth daily at bedtime      b complex-C-folic acid (NEPHRO-EDIE) 0 8 mg tablet Take 0 8 mg by mouth daily with dinner      [START ON 3/26/2020] ergocalciferol (ERGOCALCIFEROL) 1 25 MG (57573 UT) capsule Take 1 capsule (50,000 Units total) by mouth 2 (two) times a week Patient takes this med on Mondays 60 capsule 5    ferrous sulfate 325 (65 Fe) mg tablet Take 325 mg by mouth every other day Last took 3/9/20      meclizine (ANTIVERT) 25 mg tablet Take 25 mg by mouth daily at bedtime      potassium chloride 10 % oral solution Take 30 mL (40 mEq total) by mouth 5 (five) times a day 4500 mL 0    SUMAtriptan (IMITREX) 100 mg tablet Take 100 mg by mouth once as needed for migraine       thiamine 100 MG tablet Take 100 mg by mouth daily      topiramate (TOPAMAX) 100 mg tablet Take 100 mg by mouth 2 (two) times a day      zinc sulfate (ZINCATE) 220 mg capsule Take 1 capsule (220 mg total) by mouth daily 30 capsule 0     No current facility-administered medications for this visit  Facility-Administered Medications Ordered in Other Visits   Medication Dose Route Frequency Provider Last Rate Last Dose    sodium chloride 0 9 % infusion  20 mL/hr Intravenous Once Deon Ac MD   Stopped at 03/23/20 1504        Allergies   Allergen Reactions    Nsaids Other (See Comments)     Other reaction(s): Other (Please comment)  Should not take s/p bariatric surgery  Other reaction(s): Other (See Comments)  Should not take as per prior records  Should not take s/p bariatric surgery  Has hx of gastric bypass      Prednisone      Nausea, vomiting, diarrhea       Review of Systems   Constitutional: Negative for fever and unexpected weight change  HENT: Negative for hearing loss, trouble swallowing and voice change  Eyes: Negative for visual disturbance  Respiratory: Negative for cough and shortness of breath  Cardiovascular: Negative for chest pain and palpitations  Gastrointestinal: Negative for constipation, diarrhea and nausea  Musculoskeletal: Negative for myalgias  Skin: Negative for rash     Neurological: Positive for numbness  Negative for tremors  Psychiatric/Behavioral: The patient is not nervous/anxious  Physical Exam       Physical Exam   Gen: appears well-developed and well-nourished  No apparent distress  Head: Normocephalic and atraumatic  Eyes: no stare or proptosis, no periorbital edema  E/N/M nl facies, hearing grossly intact  Neck: range of motion  Pulmonary/Chest: breathing  comfortably, no accessory muscle use, effort normal    Musculoskeletal: moves upper extremities  Neurological: alert and oriented to person, place, and time  No upper ext tremor appreciated  Skin: does not appear diaphoretic, no facial plethora  Psychiatric: normal mood and affect; behavior is normal; no gross lapses in memory, answer questions appropriately      DATA  Lab Results   Component Value Date    SODIUM 139 03/23/2020    K 3 6 03/23/2020     (H) 03/23/2020    CO2 16 (L) 03/23/2020    AGAP 13 03/23/2020    BUN 15 03/23/2020    CREATININE 0 88 03/23/2020    GLUC 76 03/23/2020    GLUF 78 01/02/2020    CALCIUM 8 5 03/23/2020    AST 17 03/12/2020    ALT 20 03/12/2020    ALKPHOS 39 (L) 03/12/2020    TP 5 8 (L) 03/12/2020    TBILI 0 10 (L) 03/12/2020    EGFR 85 03/23/2020 2/2020  25 D 16 9      Assessment   35yof with hypokalemia and metabolic acidosis of unclear etiology    Plan     1  Hypokalemia: There are limited endocrine causes for this  Her thyroid hormone levels are normal and prior aldosterone testing was normal  I offered to test her charity:plasma renin and cortisol as she gets labs at infusion  2  Vitamin D deficiency: Despite weekly D, she still has low D suggesting malabsorption  I recommend increasing to [id number] twice per week with repeat in 8 weeks  New RX sent    3  Possible early menopause: To optimize her bone health, I would like to see if she is menopausal and will order an FSH/LH/estrogens  Further metabolic bone  evaluation can be based on this         I spent 30 minutes with the patient during this visit

## 2020-03-26 ENCOUNTER — HOSPITAL ENCOUNTER (OUTPATIENT)
Dept: INFUSION CENTER | Facility: HOSPITAL | Age: 35
Discharge: HOME | End: 2020-03-26
Payer: COMMERCIAL

## 2020-03-26 VITALS
RESPIRATION RATE: 18 BRPM | OXYGEN SATURATION: 98 % | TEMPERATURE: 97.5 F | SYSTOLIC BLOOD PRESSURE: 118 MMHG | DIASTOLIC BLOOD PRESSURE: 56 MMHG | HEART RATE: 83 BPM

## 2020-03-26 DIAGNOSIS — K90.89 OTHER INTESTINAL MALABSORPTION: ICD-10-CM

## 2020-03-26 DIAGNOSIS — N95.1 MENOPAUSAL SYMPTOMS: ICD-10-CM

## 2020-03-26 DIAGNOSIS — E87.6 HYPOKALEMIA: Primary | ICD-10-CM

## 2020-03-26 LAB
ANION GAP SERPL CALCULATED.3IONS-SCNC: 12 MMOL/L (ref 4–13)
BUN SERPL-MCNC: 12 MG/DL (ref 5–25)
CALCIUM SERPL-MCNC: 8.7 MG/DL (ref 8.3–10.1)
CHLORIDE SERPL-SCNC: 105 MMOL/L (ref 100–108)
CO2 SERPL-SCNC: 21 MMOL/L (ref 21–32)
CORTIS SERPL-MCNC: 10 UG/DL
CREAT SERPL-MCNC: 0.78 MG/DL (ref 0.6–1.3)
FSH SERPL-ACNC: 5.5 MIU/ML
GFR SERPL CREATININE-BSD FRML MDRD: 98 ML/MIN/1.73SQ M
GLUCOSE P FAST SERPL-MCNC: 78 MG/DL (ref 65–99)
GLUCOSE SERPL-MCNC: 78 MG/DL (ref 65–140)
LH SERPL-ACNC: 4.3 MIU/ML
POTASSIUM SERPL-SCNC: 3.5 MMOL/L (ref 3.5–5.3)
SODIUM SERPL-SCNC: 138 MMOL/L (ref 136–145)

## 2020-03-26 PROCEDURE — 83001 ASSAY OF GONADOTROPIN (FSH): CPT

## 2020-03-26 PROCEDURE — 82672 ASSAY OF ESTROGEN: CPT

## 2020-03-26 PROCEDURE — 83002 ASSAY OF GONADOTROPIN (LH): CPT

## 2020-03-26 PROCEDURE — 84244 ASSAY OF RENIN: CPT

## 2020-03-26 PROCEDURE — 82088 ASSAY OF ALDOSTERONE: CPT

## 2020-03-26 PROCEDURE — 80048 BASIC METABOLIC PNL TOTAL CA: CPT | Performed by: INTERNAL MEDICINE

## 2020-03-26 PROCEDURE — 82533 TOTAL CORTISOL: CPT

## 2020-03-26 PROCEDURE — 96366 THER/PROPH/DIAG IV INF ADDON: CPT

## 2020-03-26 PROCEDURE — 96365 THER/PROPH/DIAG IV INF INIT: CPT

## 2020-03-26 RX ORDER — SODIUM CHLORIDE 9 MG/ML
20 INJECTION, SOLUTION INTRAVENOUS ONCE
Status: DISCONTINUED | OUTPATIENT
Start: 2020-03-26 | End: 2020-03-29 | Stop reason: HOSPADM

## 2020-03-26 RX ORDER — HEPARIN SODIUM (PORCINE) LOCK FLUSH IV SOLN 100 UNIT/ML 100 UNIT/ML
300 SOLUTION INTRAVENOUS ONCE
Status: COMPLETED | OUTPATIENT
Start: 2020-03-26 | End: 2020-03-26

## 2020-03-26 RX ORDER — SODIUM CHLORIDE 9 MG/ML
20 INJECTION, SOLUTION INTRAVENOUS ONCE
Status: CANCELLED
Start: 2020-03-30

## 2020-03-26 RX ORDER — HEPARIN SODIUM (PORCINE) LOCK FLUSH IV SOLN 100 UNIT/ML 100 UNIT/ML
300 SOLUTION INTRAVENOUS ONCE
Status: CANCELLED
Start: 2020-03-30

## 2020-03-26 RX ADMIN — POTASSIUM CHLORIDE: 2 INJECTION, SOLUTION, CONCENTRATE INTRAVENOUS at 09:59

## 2020-03-26 RX ADMIN — HEPARIN 300 UNITS: 100 SYRINGE at 14:59

## 2020-03-28 LAB — ESTROGEN SERPL-MCNC: 406 PG/ML

## 2020-03-30 ENCOUNTER — TELEPHONE (OUTPATIENT)
Dept: NEPHROLOGY | Facility: CLINIC | Age: 35
End: 2020-03-30

## 2020-03-30 ENCOUNTER — HOSPITAL ENCOUNTER (OUTPATIENT)
Dept: INFUSION CENTER | Facility: HOSPITAL | Age: 35
Discharge: HOME | End: 2020-03-30
Attending: INTERNAL MEDICINE
Payer: COMMERCIAL

## 2020-03-30 VITALS
HEART RATE: 90 BPM | SYSTOLIC BLOOD PRESSURE: 107 MMHG | RESPIRATION RATE: 18 BRPM | TEMPERATURE: 98.6 F | OXYGEN SATURATION: 100 % | DIASTOLIC BLOOD PRESSURE: 52 MMHG

## 2020-03-30 DIAGNOSIS — K90.89 OTHER INTESTINAL MALABSORPTION: ICD-10-CM

## 2020-03-30 DIAGNOSIS — E87.6 HYPOKALEMIA: Primary | ICD-10-CM

## 2020-03-30 LAB
ANION GAP SERPL CALCULATED.3IONS-SCNC: 11 MMOL/L (ref 4–13)
BUN SERPL-MCNC: 15 MG/DL (ref 5–25)
CALCIUM SERPL-MCNC: 8.5 MG/DL (ref 8.3–10.1)
CHLORIDE SERPL-SCNC: 106 MMOL/L (ref 100–108)
CO2 SERPL-SCNC: 21 MMOL/L (ref 21–32)
CREAT SERPL-MCNC: 0.97 MG/DL (ref 0.6–1.3)
GFR SERPL CREATININE-BSD FRML MDRD: 75 ML/MIN/1.73SQ M
GLUCOSE SERPL-MCNC: 65 MG/DL (ref 65–140)
Lab: 4354
Lab: NORMAL
Lab: NORMAL
MISCELLANEOUS LAB TEST RESULT: NORMAL
POTASSIUM SERPL-SCNC: 4 MMOL/L (ref 3.5–5.3)
SODIUM SERPL-SCNC: 138 MMOL/L (ref 136–145)
STONE ANALYSIS-IMP: NORMAL

## 2020-03-30 PROCEDURE — 96366 THER/PROPH/DIAG IV INF ADDON: CPT

## 2020-03-30 PROCEDURE — 80048 BASIC METABOLIC PNL TOTAL CA: CPT | Performed by: INTERNAL MEDICINE

## 2020-03-30 PROCEDURE — 96365 THER/PROPH/DIAG IV INF INIT: CPT

## 2020-03-30 RX ORDER — SODIUM CHLORIDE 9 MG/ML
20 INJECTION, SOLUTION INTRAVENOUS ONCE
Status: DISCONTINUED | OUTPATIENT
Start: 2020-03-30 | End: 2020-04-02 | Stop reason: HOSPADM

## 2020-03-30 RX ORDER — HEPARIN SODIUM (PORCINE) LOCK FLUSH IV SOLN 100 UNIT/ML 100 UNIT/ML
300 SOLUTION INTRAVENOUS ONCE
Status: COMPLETED | OUTPATIENT
Start: 2020-03-30 | End: 2020-03-30

## 2020-03-30 RX ORDER — HEPARIN SODIUM (PORCINE) LOCK FLUSH IV SOLN 100 UNIT/ML 100 UNIT/ML
300 SOLUTION INTRAVENOUS ONCE
Status: CANCELLED
Start: 2020-04-02

## 2020-03-30 RX ORDER — SODIUM CHLORIDE 9 MG/ML
20 INJECTION, SOLUTION INTRAVENOUS ONCE
Status: CANCELLED
Start: 2020-04-02

## 2020-03-30 RX ADMIN — HEPARIN 300 UNITS: 100 SYRINGE at 15:05

## 2020-03-30 RX ADMIN — POTASSIUM CHLORIDE: 2 INJECTION, SOLUTION, CONCENTRATE INTRAVENOUS at 09:21

## 2020-03-31 ENCOUNTER — HOSPITAL ENCOUNTER (EMERGENCY)
Facility: HOSPITAL | Age: 35
Discharge: HOME | End: 2020-03-31
Attending: EMERGENCY MEDICINE | Admitting: EMERGENCY MEDICINE
Payer: COMMERCIAL

## 2020-03-31 ENCOUNTER — APPOINTMENT (EMERGENCY)
Dept: RADIOLOGY | Facility: HOSPITAL | Age: 35
End: 2020-03-31
Payer: COMMERCIAL

## 2020-03-31 ENCOUNTER — OFFICE VISIT (OUTPATIENT)
Dept: URGENT CARE | Facility: CLINIC | Age: 35
End: 2020-03-31
Payer: COMMERCIAL

## 2020-03-31 VITALS
BODY MASS INDEX: 28.16 KG/M2 | HEART RATE: 70 BPM | SYSTOLIC BLOOD PRESSURE: 112 MMHG | DIASTOLIC BLOOD PRESSURE: 52 MMHG | OXYGEN SATURATION: 99 % | HEIGHT: 62 IN | RESPIRATION RATE: 18 BRPM | TEMPERATURE: 97.2 F | WEIGHT: 153 LBS

## 2020-03-31 VITALS
DIASTOLIC BLOOD PRESSURE: 52 MMHG | SYSTOLIC BLOOD PRESSURE: 111 MMHG | HEIGHT: 62 IN | RESPIRATION RATE: 16 BRPM | BODY MASS INDEX: 28.89 KG/M2 | WEIGHT: 156.97 LBS | OXYGEN SATURATION: 100 % | TEMPERATURE: 97.7 F | HEART RATE: 84 BPM

## 2020-03-31 DIAGNOSIS — J40 BRONCHITIS: ICD-10-CM

## 2020-03-31 DIAGNOSIS — R07.9 CHEST PAIN, UNSPECIFIED TYPE: Primary | ICD-10-CM

## 2020-03-31 DIAGNOSIS — R06.02 SOB (SHORTNESS OF BREATH): Primary | ICD-10-CM

## 2020-03-31 DIAGNOSIS — R42 INTERMITTENT LIGHTHEADEDNESS: ICD-10-CM

## 2020-03-31 DIAGNOSIS — J02.9 SORE THROAT: ICD-10-CM

## 2020-03-31 LAB
ALBUMIN SERPL BCP-MCNC: 4.3 G/DL (ref 3.5–5)
ALP SERPL-CCNC: 56 U/L (ref 46–116)
ALT SERPL W P-5'-P-CCNC: 24 U/L (ref 12–78)
ANION GAP SERPL CALCULATED.3IONS-SCNC: 13 MMOL/L (ref 4–13)
AST SERPL W P-5'-P-CCNC: 19 U/L (ref 5–45)
ATRIAL RATE: 79 BPM
BASOPHILS # BLD AUTO: 0.05 THOUSANDS/ΜL (ref 0–0.1)
BASOPHILS NFR BLD AUTO: 1 % (ref 0–1)
BILIRUB SERPL-MCNC: 0.2 MG/DL (ref 0.2–1)
BUN SERPL-MCNC: 12 MG/DL (ref 5–25)
CALCIUM SERPL-MCNC: 9 MG/DL (ref 8.3–10.1)
CHLORIDE SERPL-SCNC: 106 MMOL/L (ref 100–108)
CO2 SERPL-SCNC: 22 MMOL/L (ref 21–32)
CREAT SERPL-MCNC: 1 MG/DL (ref 0.6–1.3)
EOSINOPHIL # BLD AUTO: 0.07 THOUSAND/ΜL (ref 0–0.61)
EOSINOPHIL NFR BLD AUTO: 2 % (ref 0–6)
ERYTHROCYTE [DISTWIDTH] IN BLOOD BY AUTOMATED COUNT: 14.5 % (ref 11.6–15.1)
GFR SERPL CREATININE-BSD FRML MDRD: 73 ML/MIN/1.73SQ M
GLUCOSE SERPL-MCNC: 78 MG/DL (ref 65–140)
HCT VFR BLD AUTO: 42.4 % (ref 34.8–46.1)
HGB BLD-MCNC: 13.3 G/DL (ref 11.5–15.4)
IMM GRANULOCYTES # BLD AUTO: 0.01 THOUSAND/UL (ref 0–0.2)
IMM GRANULOCYTES NFR BLD AUTO: 0 % (ref 0–2)
LYMPHOCYTES # BLD AUTO: 1.62 THOUSANDS/ΜL (ref 0.6–4.47)
LYMPHOCYTES NFR BLD AUTO: 36 % (ref 14–44)
MCH RBC QN AUTO: 29.2 PG (ref 26.8–34.3)
MCHC RBC AUTO-ENTMCNC: 31.4 G/DL (ref 31.4–37.4)
MCV RBC AUTO: 93 FL (ref 82–98)
MONOCYTES # BLD AUTO: 0.31 THOUSAND/ΜL (ref 0.17–1.22)
MONOCYTES NFR BLD AUTO: 7 % (ref 4–12)
NEUTROPHILS # BLD AUTO: 2.39 THOUSANDS/ΜL (ref 1.85–7.62)
NEUTS SEG NFR BLD AUTO: 54 % (ref 43–75)
NRBC BLD AUTO-RTO: 0 /100 WBCS
P AXIS: 78 DEGREES
PLATELET # BLD AUTO: 349 THOUSANDS/UL (ref 149–390)
PMV BLD AUTO: 11.1 FL (ref 8.9–12.7)
POTASSIUM SERPL-SCNC: 3.5 MMOL/L (ref 3.5–5.3)
PR INTERVAL: 118 MS
PROT SERPL-MCNC: 8.4 G/DL (ref 6.4–8.2)
QRS AXIS: 53 DEGREES
QRSD INTERVAL: 102 MS
QT INTERVAL: 356 MS
QTC INTERVAL: 408 MS
RBC # BLD AUTO: 4.56 MILLION/UL (ref 3.81–5.12)
S PYO AG THROAT QL: NEGATIVE
SODIUM SERPL-SCNC: 141 MMOL/L (ref 136–145)
T WAVE AXIS: 78 DEGREES
TROPONIN I SERPL-MCNC: <0.02 NG/ML
VENTRICULAR RATE: 79 BPM
WBC # BLD AUTO: 4.45 THOUSAND/UL (ref 4.31–10.16)

## 2020-03-31 PROCEDURE — G0382 LEV 3 HOSP TYPE B ED VISIT: HCPCS | Performed by: PHYSICIAN ASSISTANT

## 2020-03-31 PROCEDURE — 99285 EMERGENCY DEPT VISIT HI MDM: CPT

## 2020-03-31 PROCEDURE — 80053 COMPREHEN METABOLIC PANEL: CPT | Performed by: EMERGENCY MEDICINE

## 2020-03-31 PROCEDURE — 85025 COMPLETE CBC W/AUTO DIFF WBC: CPT | Performed by: EMERGENCY MEDICINE

## 2020-03-31 PROCEDURE — 71045 X-RAY EXAM CHEST 1 VIEW: CPT

## 2020-03-31 PROCEDURE — 84484 ASSAY OF TROPONIN QUANT: CPT | Performed by: EMERGENCY MEDICINE

## 2020-03-31 PROCEDURE — 93005 ELECTROCARDIOGRAM TRACING: CPT

## 2020-03-31 PROCEDURE — 87880 STREP A ASSAY W/OPTIC: CPT | Performed by: PHYSICIAN ASSISTANT

## 2020-03-31 PROCEDURE — 36415 COLL VENOUS BLD VENIPUNCTURE: CPT | Performed by: EMERGENCY MEDICINE

## 2020-03-31 PROCEDURE — 96374 THER/PROPH/DIAG INJ IV PUSH: CPT

## 2020-03-31 PROCEDURE — 99284 EMERGENCY DEPT VISIT MOD MDM: CPT | Performed by: EMERGENCY MEDICINE

## 2020-03-31 PROCEDURE — 93010 ELECTROCARDIOGRAM REPORT: CPT | Performed by: INTERNAL MEDICINE

## 2020-03-31 RX ORDER — ALBUTEROL SULFATE 90 UG/1
1-2 AEROSOL, METERED RESPIRATORY (INHALATION) EVERY 6 HOURS PRN
Qty: 1 INHALER | Refills: 0 | Status: ON HOLD | OUTPATIENT
Start: 2020-03-31 | End: 2020-04-16 | Stop reason: ALTCHOICE

## 2020-03-31 RX ORDER — ALBUTEROL SULFATE 90 UG/1
2 AEROSOL, METERED RESPIRATORY (INHALATION) ONCE
Status: COMPLETED | OUTPATIENT
Start: 2020-03-31 | End: 2020-03-31

## 2020-03-31 RX ORDER — KETOROLAC TROMETHAMINE 30 MG/ML
15 INJECTION, SOLUTION INTRAMUSCULAR; INTRAVENOUS ONCE
Status: COMPLETED | OUTPATIENT
Start: 2020-03-31 | End: 2020-03-31

## 2020-03-31 RX ADMIN — KETOROLAC TROMETHAMINE 15 MG: 30 INJECTION, SOLUTION INTRAMUSCULAR at 09:29

## 2020-03-31 RX ADMIN — ALBUTEROL SULFATE 2 PUFF: 90 AEROSOL, METERED RESPIRATORY (INHALATION) at 09:29

## 2020-03-31 NOTE — PROGRESS NOTES
[address]        NAME: Azul Argueta is a 38 y o  female  : 1985    MRN: [id number]  DATE: 2020  TIME: 8:51 AM    Assessment and Plan   SOB (shortness of breath) [R06 02]  1  SOB (shortness of breath)  Ambulatory Referral to Emergency Medicine    Transfer to other facility   2  Intermittent lightheadedness  Ambulatory Referral to Emergency Medicine    Transfer to other facility   3  Sore throat  POCT rapid strepA     Advised patient to take ambulance to ER due to lightheadedness and near syncope  Advised patient she could experience dizziness and have a car accident which could result in permanent disability or death  Patient declined and will drive by private vehicle to ER  Patient signed AMA forms  Patient Instructions   Go directly to the ER  Pull over and call 911 if you begin to have severe symptoms  Patient verbalized understanding  Follow up with PCP in 3-5 days  Proceed to  ER if symptoms worsen  Chief Complaint     Chief Complaint   Patient presents with    Sore Throat     sore throat for 4 days  Has some sob with difficulty taking a deep breath x 5 days  Slight dry cough  Lightheaed         History of Present Illness       Patient is a 58-year-old female with negative past medical history of vertigo, gastric bypass, and current hypokalemia infusions who presents the office complaining of shortness of breath, lightheadedness, and chest tightness for 5 days  Patient states she is intermittently short of breath at rest and has difficulty taking a deep breath  Patient states she is easily fatigued and short of breath with normal activities including talking  She reports lightheadedness usually occurs with deep breaths and experiences near-syncope  She has chest tightness in the center of her chest described as burning  Patient states she has a mild dry cough  Patient also complaining of sore throat and hoarse voice    Patient attempted steamy shower, heating pad to chest, and Tylenol with no relief  Patient reports she is drinking fluids however has had a decrease in appetite  Patient denies fever, chills, blurry vision, double vision, palpitations, nausea, vomiting, diarrhea, one-sided weakness  Patient called PCP yesterday and told she likely has a viral infection and there is nothing they can do  Patient came in for evaluation because the infusion center would not allow her to return with her symptoms  Patient's last potassium infusion was yesterday with a reported potassium of 4 0      Patient denies hx of DVT/PE, immobilization, travel, recent surgery, cancer, or calf swelling  Review of Systems   Review of Systems   Constitutional: Positive for fatigue  Negative for chills and fever  HENT: Positive for sore throat and voice change  Negative for congestion, ear pain, postnasal drip, rhinorrhea, sinus pressure, sinus pain and sneezing  Eyes: Negative for photophobia and visual disturbance  Respiratory: Positive for cough, chest tightness and shortness of breath  Cardiovascular: Positive for chest pain (burning)  Negative for palpitations  Gastrointestinal: Negative for abdominal pain, diarrhea, nausea and vomiting  Genitourinary: Negative for dysuria  Musculoskeletal: Negative for myalgias  Skin: Negative for rash  Neurological: Positive for dizziness, weakness and light-headedness  Negative for syncope and headaches  Tremors: Generalized           Current Medications       Current Outpatient Medications:     AMILoride 5 mg tablet, Take 1 tablet (5 mg total) by mouth daily, Disp: 30 tablet, Rfl: 0    b complex-C-folic acid (NEPHRO-EDIE) 0 8 mg tablet, Take 0 8 mg by mouth daily with dinner, Disp: , Rfl:     ergocalciferol (ERGOCALCIFEROL) 1 25 MG (07463 UT) capsule, Take 1 capsule (50,000 Units total) by mouth 2 (two) times a week Patient takes this med on Mondays, Disp: 60 capsule, Rfl: 5    ferrous sulfate 325 (65 Fe) mg tablet, Take 325 mg by mouth every other day Last took 3/9/20, Disp: , Rfl:     meclizine (ANTIVERT) 25 mg tablet, Take 25 mg by mouth daily at bedtime, Disp: , Rfl:     other medication, see sig,, *Potassium infusions 2x week, Disp: , Rfl:     SUMAtriptan (IMITREX) 100 mg tablet, Take 100 mg by mouth once as needed for migraine , Disp: , Rfl:     thiamine 100 MG tablet, Take 100 mg by mouth daily, Disp: , Rfl:     topiramate (TOPAMAX) 100 mg tablet, Take 100 mg by mouth 2 (two) times a day, Disp: , Rfl:     zinc sulfate (ZINCATE) 220 mg capsule, Take 1 capsule (220 mg total) by mouth daily, Disp: 30 capsule, Rfl: 0    amitriptyline (ELAVIL) 50 mg tablet, Take 50 mg by mouth daily at bedtime, Disp: , Rfl:     potassium chloride 10 % oral solution, Take 30 mL (40 mEq total) by mouth 5 (five) times a day, Disp: 4500 mL, Rfl: 0  No current facility-administered medications for this visit       Facility-Administered Medications Ordered in Other Visits:     sodium chloride 0 9 % infusion, 20 mL/hr, Intravenous, Once, Deon Ac MD    Current Allergies     Allergies as of 2020 - Reviewed 2020   Allergen Reaction Noted    Nsaids Other (See Comments) 2017    Prednisone  2019            The following portions of the patient's history were reviewed and updated as appropriate: allergies, current medications, past family history, past medical history, past social history, past surgical history and problem list      Past Medical History:   Diagnosis Date    H  pylori infection     Hypokalemia     Migraine     Renal disorder     Rhabdomyolysis        Past Surgical History:   Procedure Laterality Date    ABDOMINAL SURGERY      APPENDECTOMY       SECTION      CHOLECYSTECTOMY      COSMETIC SURGERY      FL GUIDED CENTRAL VENOUS ACCESS DEVICE INSERTION  2018    GASTRIC BYPASS      HYSTERECTOMY      TUNNELED VENOUS PORT PLACEMENT N/A 2018    Procedure: INSERTION VENOUS PORT (PORT-A-CATH); Surgeon: Champ Rasmussen DO;  Location: [facility];  Service: General       Family History   Problem Relation Age of Onset    No Known Problems Mother     Hypertension Father     Diabetes Father     Diabetes Maternal Grandfather          Medications have been verified  Objective   /52   Pulse 70   Temp (!) 97 2 °F (36 2 °C) (Tympanic)   Resp 18   Ht 5' 2" (1 575 m)   Wt 69 4 kg (153 lb)   SpO2 99%   BMI 27 98 kg/m²        Physical Exam     Physical Exam   Constitutional: Vital signs are normal  She appears well-developed and well-nourished  She does not appear ill  No distress  Patient appears older than stated age   HENT:   Head: Normocephalic and atraumatic  Right Ear: Tympanic membrane, external ear and ear canal normal    Left Ear: Tympanic membrane, external ear and ear canal normal    Nose: Nose normal  Right sinus exhibits no maxillary sinus tenderness and no frontal sinus tenderness  Left sinus exhibits no maxillary sinus tenderness  Mouth/Throat: Uvula is midline and mucous membranes are normal  Posterior oropharyngeal erythema present  No posterior oropharyngeal edema  No tonsillar exudate  Eyes: Pupils are equal, round, and reactive to light  Conjunctivae, EOM and lids are normal    Neck: Neck supple  Cardiovascular: Normal rate, regular rhythm, normal heart sounds and normal pulses  Exam reveals no gallop and no friction rub  No murmur heard  Pulmonary/Chest: Effort normal and breath sounds normal  No respiratory distress  She has no wheezes  She has no rhonchi  She has no rales  She exhibits tenderness (Center of chest, mild)  Patient experienced lightheadedness and dizziness during deep breaths  Abdominal: Soft  Normal appearance and bowel sounds are normal  There is no tenderness  Musculoskeletal: Normal range of motion  Lymphadenopathy:     She has no cervical adenopathy  Neurological: She is alert   She has normal strength and normal reflexes  No cranial nerve deficit or sensory deficit  GCS eye subscore is 4  GCS verbal subscore is 5  GCS motor subscore is 6  Skin: Skin is warm and dry  Capillary refill takes less than 2 seconds  No rash noted  Psychiatric: She has a normal mood and affect  Nursing note and vitals reviewed  Rapid strep negative

## 2020-04-02 ENCOUNTER — HOSPITAL ENCOUNTER (OUTPATIENT)
Dept: INFUSION CENTER | Facility: HOSPITAL | Age: 35
Discharge: HOME | End: 2020-04-02
Payer: COMMERCIAL

## 2020-04-02 VITALS
DIASTOLIC BLOOD PRESSURE: 55 MMHG | SYSTOLIC BLOOD PRESSURE: 110 MMHG | RESPIRATION RATE: 16 BRPM | TEMPERATURE: 98 F | HEART RATE: 88 BPM

## 2020-04-02 DIAGNOSIS — E87.6 HYPOKALEMIA: ICD-10-CM

## 2020-04-02 DIAGNOSIS — K90.89 OTHER INTESTINAL MALABSORPTION: Primary | ICD-10-CM

## 2020-04-02 LAB
ANION GAP SERPL CALCULATED.3IONS-SCNC: 12 MMOL/L (ref 4–13)
BUN SERPL-MCNC: 11 MG/DL (ref 5–25)
CALCIUM SERPL-MCNC: 9 MG/DL (ref 8.3–10.1)
CHLORIDE SERPL-SCNC: 105 MMOL/L (ref 100–108)
CO2 SERPL-SCNC: 19 MMOL/L (ref 21–32)
CREAT SERPL-MCNC: 0.95 MG/DL (ref 0.6–1.3)
GFR SERPL CREATININE-BSD FRML MDRD: 77 ML/MIN/1.73SQ M
GLUCOSE SERPL-MCNC: 74 MG/DL (ref 65–140)
POTASSIUM SERPL-SCNC: 4 MMOL/L (ref 3.5–5.3)
SODIUM SERPL-SCNC: 136 MMOL/L (ref 136–145)

## 2020-04-02 PROCEDURE — 96365 THER/PROPH/DIAG IV INF INIT: CPT

## 2020-04-02 PROCEDURE — 80048 BASIC METABOLIC PNL TOTAL CA: CPT | Performed by: INTERNAL MEDICINE

## 2020-04-02 PROCEDURE — 96366 THER/PROPH/DIAG IV INF ADDON: CPT

## 2020-04-02 RX ORDER — SODIUM CHLORIDE 9 MG/ML
20 INJECTION, SOLUTION INTRAVENOUS ONCE
Status: CANCELLED
Start: 2020-04-02

## 2020-04-02 RX ORDER — CYANOCOBALAMIN 1000 UG/ML
1000 INJECTION INTRAMUSCULAR; SUBCUTANEOUS ONCE
Status: CANCELLED | OUTPATIENT
Start: 2020-04-09

## 2020-04-02 RX ORDER — SODIUM CHLORIDE 9 MG/ML
20 INJECTION, SOLUTION INTRAVENOUS ONCE
Status: DISCONTINUED | OUTPATIENT
Start: 2020-04-02 | End: 2020-04-05 | Stop reason: HOSPADM

## 2020-04-02 RX ORDER — HEPARIN SODIUM (PORCINE) LOCK FLUSH IV SOLN 100 UNIT/ML 100 UNIT/ML
300 SOLUTION INTRAVENOUS ONCE
Status: CANCELLED
Start: 2020-04-02

## 2020-04-02 RX ORDER — HEPARIN SODIUM (PORCINE) LOCK FLUSH IV SOLN 100 UNIT/ML 100 UNIT/ML
300 SOLUTION INTRAVENOUS ONCE
Status: COMPLETED | OUTPATIENT
Start: 2020-04-02 | End: 2020-04-02

## 2020-04-02 RX ADMIN — HEPARIN 3 UNITS: 100 SYRINGE at 14:51

## 2020-04-02 RX ADMIN — POTASSIUM CHLORIDE: 2 INJECTION, SOLUTION, CONCENTRATE INTRAVENOUS at 09:27

## 2020-04-06 ENCOUNTER — HOSPITAL ENCOUNTER (OUTPATIENT)
Dept: INFUSION CENTER | Facility: HOSPITAL | Age: 35
End: 2020-04-06

## 2020-04-13 ENCOUNTER — HOSPITAL ENCOUNTER (OUTPATIENT)
Dept: INFUSION CENTER | Facility: HOSPITAL | Age: 35
Discharge: HOME | End: 2020-04-13
Payer: COMMERCIAL

## 2020-04-13 VITALS
HEART RATE: 96 BPM | OXYGEN SATURATION: 98 % | DIASTOLIC BLOOD PRESSURE: 66 MMHG | RESPIRATION RATE: 16 BRPM | SYSTOLIC BLOOD PRESSURE: 116 MMHG | TEMPERATURE: 98.4 F

## 2020-04-13 DIAGNOSIS — E87.6 HYPOKALEMIA: Primary | ICD-10-CM

## 2020-04-13 DIAGNOSIS — K90.89 OTHER INTESTINAL MALABSORPTION: ICD-10-CM

## 2020-04-13 LAB
ANION GAP SERPL CALCULATED.3IONS-SCNC: 11 MMOL/L (ref 4–13)
BUN SERPL-MCNC: 16 MG/DL (ref 5–25)
CALCIUM SERPL-MCNC: 8.5 MG/DL (ref 8.3–10.1)
CHLORIDE SERPL-SCNC: 102 MMOL/L (ref 100–108)
CO2 SERPL-SCNC: 23 MMOL/L (ref 21–32)
CREAT SERPL-MCNC: 1.08 MG/DL (ref 0.6–1.3)
GFR SERPL CREATININE-BSD FRML MDRD: 66 ML/MIN/1.73SQ M
GLUCOSE SERPL-MCNC: 88 MG/DL (ref 65–140)
POTASSIUM SERPL-SCNC: 2.6 MMOL/L (ref 3.5–5.3)
SODIUM SERPL-SCNC: 136 MMOL/L (ref 136–145)

## 2020-04-13 PROCEDURE — 96366 THER/PROPH/DIAG IV INF ADDON: CPT

## 2020-04-13 PROCEDURE — 80048 BASIC METABOLIC PNL TOTAL CA: CPT | Performed by: INTERNAL MEDICINE

## 2020-04-13 PROCEDURE — 96365 THER/PROPH/DIAG IV INF INIT: CPT

## 2020-04-13 RX ORDER — HEPARIN SODIUM (PORCINE) LOCK FLUSH IV SOLN 100 UNIT/ML 100 UNIT/ML
300 SOLUTION INTRAVENOUS ONCE
Status: DISCONTINUED | OUTPATIENT
Start: 2020-04-13 | End: 2020-04-16 | Stop reason: HOSPADM

## 2020-04-13 RX ORDER — SODIUM CHLORIDE 9 MG/ML
20 INJECTION, SOLUTION INTRAVENOUS ONCE
Status: CANCELLED
Start: 2020-04-16

## 2020-04-13 RX ORDER — SODIUM CHLORIDE 9 MG/ML
20 INJECTION, SOLUTION INTRAVENOUS ONCE
Status: DISCONTINUED | OUTPATIENT
Start: 2020-04-13 | End: 2020-04-16 | Stop reason: HOSPADM

## 2020-04-13 RX ORDER — HEPARIN SODIUM (PORCINE) LOCK FLUSH IV SOLN 100 UNIT/ML 100 UNIT/ML
300 SOLUTION INTRAVENOUS ONCE
Status: CANCELLED
Start: 2020-04-16

## 2020-04-13 RX ADMIN — POTASSIUM CHLORIDE 60 MEQ: 149 INJECTION, SOLUTION, CONCENTRATE INTRAVENOUS at 09:39

## 2020-04-16 ENCOUNTER — TELEPHONE (OUTPATIENT)
Dept: NEPHROLOGY | Facility: CLINIC | Age: 35
End: 2020-04-16

## 2020-04-16 ENCOUNTER — HOSPITAL ENCOUNTER (OUTPATIENT)
Dept: INFUSION CENTER | Facility: HOSPITAL | Age: 35
Discharge: HOME | End: 2020-04-16
Payer: COMMERCIAL

## 2020-04-16 ENCOUNTER — DOCUMENTATION (OUTPATIENT)
Dept: OTHER | Facility: HOSPITAL | Age: 35
End: 2020-04-16

## 2020-04-16 ENCOUNTER — HOSPITAL ENCOUNTER (OUTPATIENT)
Facility: HOSPITAL | Age: 35
Setting detail: OBSERVATION
LOS: 1 days | Discharge: HOME | End: 2020-04-17
Attending: INTERNAL MEDICINE | Admitting: INTERNAL MEDICINE
Payer: COMMERCIAL

## 2020-04-16 VITALS
DIASTOLIC BLOOD PRESSURE: 56 MMHG | SYSTOLIC BLOOD PRESSURE: 118 MMHG | OXYGEN SATURATION: 100 % | TEMPERATURE: 97.2 F | HEART RATE: 76 BPM | RESPIRATION RATE: 16 BRPM

## 2020-04-16 DIAGNOSIS — E87.6 HYPOKALEMIA: ICD-10-CM

## 2020-04-16 DIAGNOSIS — K90.89 OTHER INTESTINAL MALABSORPTION: Primary | ICD-10-CM

## 2020-04-16 DIAGNOSIS — E87.6 HYPOKALEMIA: Primary | ICD-10-CM

## 2020-04-16 DIAGNOSIS — K90.89 OTHER INTESTINAL MALABSORPTION: ICD-10-CM

## 2020-04-16 LAB
ANION GAP SERPL CALCULATED.3IONS-SCNC: 11 MMOL/L (ref 4–13)
ANION GAP SERPL CALCULATED.3IONS-SCNC: 16 MMOL/L (ref 4–13)
BUN SERPL-MCNC: 13 MG/DL (ref 5–25)
BUN SERPL-MCNC: 13 MG/DL (ref 5–25)
CALCIUM SERPL-MCNC: 7.7 MG/DL (ref 8.3–10.1)
CALCIUM SERPL-MCNC: 8.6 MG/DL (ref 8.3–10.1)
CHLORIDE SERPL-SCNC: 104 MMOL/L (ref 100–108)
CHLORIDE SERPL-SCNC: 108 MMOL/L (ref 100–108)
CO2 SERPL-SCNC: 20 MMOL/L (ref 21–32)
CO2 SERPL-SCNC: 20 MMOL/L (ref 21–32)
CREAT SERPL-MCNC: 0.99 MG/DL (ref 0.6–1.3)
CREAT SERPL-MCNC: 1.01 MG/DL (ref 0.6–1.3)
GFR SERPL CREATININE-BSD FRML MDRD: 72 ML/MIN/1.73SQ M
GFR SERPL CREATININE-BSD FRML MDRD: 74 ML/MIN/1.73SQ M
GLUCOSE SERPL-MCNC: 111 MG/DL (ref 65–140)
GLUCOSE SERPL-MCNC: 84 MG/DL (ref 65–140)
MAGNESIUM SERPL-MCNC: 2.1 MG/DL (ref 1.6–2.6)
POTASSIUM SERPL-SCNC: 2.1 MMOL/L (ref 3.5–5.3)
POTASSIUM SERPL-SCNC: 2.8 MMOL/L (ref 3.5–5.3)
SODIUM SERPL-SCNC: 139 MMOL/L (ref 136–145)
SODIUM SERPL-SCNC: 140 MMOL/L (ref 136–145)

## 2020-04-16 PROCEDURE — 99244 OFF/OP CNSLTJ NEW/EST MOD 40: CPT | Performed by: NURSE PRACTITIONER

## 2020-04-16 PROCEDURE — 99219 PR INITIAL OBSERVATION CARE/DAY 50 MINUTES: CPT | Performed by: INTERNAL MEDICINE

## 2020-04-16 PROCEDURE — G0379 DIRECT REFER HOSPITAL OBSERV: HCPCS

## 2020-04-16 PROCEDURE — 83735 ASSAY OF MAGNESIUM: CPT | Performed by: NURSE PRACTITIONER

## 2020-04-16 PROCEDURE — 80048 BASIC METABOLIC PNL TOTAL CA: CPT | Performed by: INTERNAL MEDICINE

## 2020-04-16 RX ORDER — HEPARIN SODIUM (PORCINE) LOCK FLUSH IV SOLN 100 UNIT/ML 100 UNIT/ML
300 SOLUTION INTRAVENOUS ONCE
Status: CANCELLED
Start: 2020-04-20

## 2020-04-16 RX ORDER — POLYETHYLENE GLYCOL 3350 17 G/17G
17 POWDER, FOR SOLUTION ORAL DAILY PRN
Status: DISCONTINUED | OUTPATIENT
Start: 2020-04-16 | End: 2020-04-17 | Stop reason: HOSPADM

## 2020-04-16 RX ORDER — SODIUM CHLORIDE, SODIUM GLUCONATE, SODIUM ACETATE, POTASSIUM CHLORIDE, MAGNESIUM CHLORIDE, SODIUM PHOSPHATE, DIBASIC, AND POTASSIUM PHOSPHATE .53; .5; .37; .037; .03; .012; .00082 G/100ML; G/100ML; G/100ML; G/100ML; G/100ML; G/100ML; G/100ML
50 INJECTION, SOLUTION INTRAVENOUS CONTINUOUS
Status: DISCONTINUED | OUTPATIENT
Start: 2020-04-16 | End: 2020-04-17

## 2020-04-16 RX ORDER — AMILORIDE HYDROCHLORIDE 5 MG/1
5 TABLET ORAL DAILY
Status: DISCONTINUED | OUTPATIENT
Start: 2020-04-17 | End: 2020-04-17 | Stop reason: HOSPADM

## 2020-04-16 RX ORDER — ZINC SULFATE 50(220)MG
220 CAPSULE ORAL DAILY
Status: DISCONTINUED | OUTPATIENT
Start: 2020-04-17 | End: 2020-04-17 | Stop reason: HOSPADM

## 2020-04-16 RX ORDER — MECLIZINE HYDROCHLORIDE 25 MG/1
25 TABLET ORAL
Status: DISCONTINUED | OUTPATIENT
Start: 2020-04-16 | End: 2020-04-17 | Stop reason: HOSPADM

## 2020-04-16 RX ORDER — SUMATRIPTAN 25 MG/1
100 TABLET, FILM COATED ORAL ONCE AS NEEDED
Status: DISCONTINUED | OUTPATIENT
Start: 2020-04-16 | End: 2020-04-17 | Stop reason: HOSPADM

## 2020-04-16 RX ORDER — MAGNESIUM HYDROXIDE/ALUMINUM HYDROXICE/SIMETHICONE 120; 1200; 1200 MG/30ML; MG/30ML; MG/30ML
30 SUSPENSION ORAL EVERY 6 HOURS PRN
Status: DISCONTINUED | OUTPATIENT
Start: 2020-04-16 | End: 2020-04-17 | Stop reason: HOSPADM

## 2020-04-16 RX ORDER — POTASSIUM CHLORIDE 14.9 MG/ML
20 INJECTION INTRAVENOUS ONCE
Status: COMPLETED | OUTPATIENT
Start: 2020-04-16 | End: 2020-04-16

## 2020-04-16 RX ORDER — FERROUS SULFATE 325(65) MG
325 TABLET ORAL EVERY OTHER DAY
Status: DISCONTINUED | OUTPATIENT
Start: 2020-04-16 | End: 2020-04-17 | Stop reason: HOSPADM

## 2020-04-16 RX ORDER — ONDANSETRON 2 MG/ML
4 INJECTION INTRAMUSCULAR; INTRAVENOUS EVERY 6 HOURS PRN
Status: DISCONTINUED | OUTPATIENT
Start: 2020-04-16 | End: 2020-04-17 | Stop reason: HOSPADM

## 2020-04-16 RX ORDER — AMITRIPTYLINE HYDROCHLORIDE 50 MG/1
50 TABLET, FILM COATED ORAL
Status: DISCONTINUED | OUTPATIENT
Start: 2020-04-16 | End: 2020-04-17 | Stop reason: HOSPADM

## 2020-04-16 RX ORDER — POTASSIUM CHLORIDE 20MEQ/15ML
40 LIQUID (ML) ORAL
Status: DISCONTINUED | OUTPATIENT
Start: 2020-04-16 | End: 2020-04-17 | Stop reason: HOSPADM

## 2020-04-16 RX ORDER — TOPIRAMATE 100 MG/1
100 TABLET, FILM COATED ORAL 2 TIMES DAILY
Status: DISCONTINUED | OUTPATIENT
Start: 2020-04-16 | End: 2020-04-17 | Stop reason: HOSPADM

## 2020-04-16 RX ORDER — THIAMINE MONONITRATE (VIT B1) 100 MG
100 TABLET ORAL DAILY
Status: DISCONTINUED | OUTPATIENT
Start: 2020-04-17 | End: 2020-04-17 | Stop reason: HOSPADM

## 2020-04-16 RX ORDER — POTASSIUM CHLORIDE 14.9 MG/ML
20 INJECTION INTRAVENOUS ONCE
Status: COMPLETED | OUTPATIENT
Start: 2020-04-16 | End: 2020-04-17

## 2020-04-16 RX ORDER — POTASSIUM CHLORIDE 14.9 MG/ML
20 INJECTION INTRAVENOUS ONCE
Status: COMPLETED | OUTPATIENT
Start: 2020-04-17 | End: 2020-04-17

## 2020-04-16 RX ORDER — SODIUM CHLORIDE 9 MG/ML
20 INJECTION, SOLUTION INTRAVENOUS ONCE
Status: CANCELLED
Start: 2020-04-20

## 2020-04-16 RX ADMIN — POTASSIUM CHLORIDE 60 MEQ: 149 INJECTION, SOLUTION, CONCENTRATE INTRAVENOUS at 12:34

## 2020-04-16 RX ADMIN — POTASSIUM CHLORIDE 40 MEQ: 20 SOLUTION ORAL at 21:41

## 2020-04-16 RX ADMIN — MECLIZINE HYDROCHLORIDE 25 MG: 25 TABLET ORAL at 21:42

## 2020-04-16 RX ADMIN — POTASSIUM CHLORIDE 20 MEQ: 14.9 INJECTION, SOLUTION INTRAVENOUS at 21:44

## 2020-04-16 RX ADMIN — POTASSIUM CHLORIDE 40 MEQ: 20 SOLUTION ORAL at 14:48

## 2020-04-16 RX ADMIN — POTASSIUM CHLORIDE 20 MEQ: 14.9 INJECTION, SOLUTION INTRAVENOUS at 23:37

## 2020-04-16 RX ADMIN — AMITRIPTYLINE HYDROCHLORIDE 50 MG: 50 TABLET, FILM COATED ORAL at 21:43

## 2020-04-16 RX ADMIN — SODIUM CHLORIDE, SODIUM GLUCONATE, SODIUM ACETATE, POTASSIUM CHLORIDE AND MAGNESIUM CHLORIDE 50 ML/HR: 526; 502; 368; 37; 30 INJECTION, SOLUTION INTRAVENOUS at 21:38

## 2020-04-16 RX ADMIN — TOPIRAMATE 100 MG: 100 TABLET, FILM COATED ORAL at 18:30

## 2020-04-16 RX ADMIN — POTASSIUM CHLORIDE 20 MEQ: 14.9 INJECTION, SOLUTION INTRAVENOUS at 12:34

## 2020-04-16 RX ADMIN — POTASSIUM CHLORIDE 40 MEQ: 20 SOLUTION ORAL at 18:30

## 2020-04-17 VITALS
TEMPERATURE: 97.9 F | OXYGEN SATURATION: 100 % | DIASTOLIC BLOOD PRESSURE: 65 MMHG | RESPIRATION RATE: 18 BRPM | HEIGHT: 62 IN | SYSTOLIC BLOOD PRESSURE: 110 MMHG | BODY MASS INDEX: 28.52 KG/M2 | HEART RATE: 78 BPM | WEIGHT: 154.98 LBS

## 2020-04-17 LAB
ANION GAP SERPL CALCULATED.3IONS-SCNC: 11 MMOL/L (ref 4–13)
ANION GAP SERPL CALCULATED.3IONS-SCNC: 9 MMOL/L (ref 4–13)
BUN SERPL-MCNC: 10 MG/DL (ref 5–25)
BUN SERPL-MCNC: 11 MG/DL (ref 5–25)
CALCIUM SERPL-MCNC: 7.9 MG/DL (ref 8.3–10.1)
CALCIUM SERPL-MCNC: 7.9 MG/DL (ref 8.3–10.1)
CHLORIDE SERPL-SCNC: 110 MMOL/L (ref 100–108)
CHLORIDE SERPL-SCNC: 110 MMOL/L (ref 100–108)
CO2 SERPL-SCNC: 19 MMOL/L (ref 21–32)
CO2 SERPL-SCNC: 20 MMOL/L (ref 21–32)
CREAT SERPL-MCNC: 0.86 MG/DL (ref 0.6–1.3)
CREAT SERPL-MCNC: 0.89 MG/DL (ref 0.6–1.3)
GFR SERPL CREATININE-BSD FRML MDRD: 84 ML/MIN/1.73SQ M
GFR SERPL CREATININE-BSD FRML MDRD: 87 ML/MIN/1.73SQ M
GLUCOSE SERPL-MCNC: 78 MG/DL (ref 65–140)
GLUCOSE SERPL-MCNC: 82 MG/DL (ref 65–140)
MAGNESIUM SERPL-MCNC: 2.1 MG/DL (ref 1.6–2.6)
POTASSIUM SERPL-SCNC: 3.4 MMOL/L (ref 3.5–5.3)
POTASSIUM SERPL-SCNC: 4.3 MMOL/L (ref 3.5–5.3)
SODIUM SERPL-SCNC: 138 MMOL/L (ref 136–145)
SODIUM SERPL-SCNC: 141 MMOL/L (ref 136–145)

## 2020-04-17 PROCEDURE — 83735 ASSAY OF MAGNESIUM: CPT | Performed by: NURSE PRACTITIONER

## 2020-04-17 PROCEDURE — 99214 OFFICE O/P EST MOD 30 MIN: CPT | Performed by: NURSE PRACTITIONER

## 2020-04-17 PROCEDURE — 80048 BASIC METABOLIC PNL TOTAL CA: CPT | Performed by: NURSE PRACTITIONER

## 2020-04-17 PROCEDURE — 99217 PR OBSERVATION CARE DISCHARGE MANAGEMENT: CPT | Performed by: INTERNAL MEDICINE

## 2020-04-17 PROCEDURE — 80048 BASIC METABOLIC PNL TOTAL CA: CPT | Performed by: INTERNAL MEDICINE

## 2020-04-17 RX ORDER — POTASSIUM CHLORIDE 14.9 MG/ML
20 INJECTION INTRAVENOUS ONCE
Status: COMPLETED | OUTPATIENT
Start: 2020-04-17 | End: 2020-04-17

## 2020-04-17 RX ADMIN — TOPIRAMATE 100 MG: 100 TABLET, FILM COATED ORAL at 08:54

## 2020-04-17 RX ADMIN — ZINC SULFATE 220 MG (50 MG) CAPSULE 220 MG: CAPSULE at 08:54

## 2020-04-17 RX ADMIN — POTASSIUM CHLORIDE 20 MEQ: 14.9 INJECTION, SOLUTION INTRAVENOUS at 08:54

## 2020-04-17 RX ADMIN — POTASSIUM CHLORIDE 40 MEQ: 20 SOLUTION ORAL at 15:19

## 2020-04-17 RX ADMIN — B-COMPLEX W/ C & FOLIC ACID TAB 1 TABLET: TAB at 08:55

## 2020-04-17 RX ADMIN — POTASSIUM CHLORIDE 40 MEQ: 20 SOLUTION ORAL at 06:44

## 2020-04-17 RX ADMIN — AMILORIDE HYDROCLORIDE 5 MG: 5 TABLET ORAL at 08:55

## 2020-04-17 RX ADMIN — POTASSIUM CHLORIDE 40 MEQ: 20 SOLUTION ORAL at 11:04

## 2020-04-17 RX ADMIN — POTASSIUM CHLORIDE 20 MEQ: 14.9 INJECTION, SOLUTION INTRAVENOUS at 11:04

## 2020-04-17 RX ADMIN — THIAMINE HCL TAB 100 MG 100 MG: 100 TAB at 08:54

## 2020-04-17 RX ADMIN — POTASSIUM CHLORIDE 20 MEQ: 14.9 INJECTION, SOLUTION INTRAVENOUS at 01:23

## 2020-04-20 ENCOUNTER — HOSPITAL ENCOUNTER (OUTPATIENT)
Dept: INFUSION CENTER | Facility: HOSPITAL | Age: 35
Discharge: HOME | End: 2020-04-20
Payer: COMMERCIAL

## 2020-04-20 VITALS
RESPIRATION RATE: 18 BRPM | HEART RATE: 90 BPM | DIASTOLIC BLOOD PRESSURE: 56 MMHG | TEMPERATURE: 98.2 F | SYSTOLIC BLOOD PRESSURE: 122 MMHG

## 2020-04-20 DIAGNOSIS — E87.6 HYPOKALEMIA: Primary | ICD-10-CM

## 2020-04-20 DIAGNOSIS — E53.8 B12 DEFICIENCY: ICD-10-CM

## 2020-04-20 DIAGNOSIS — K90.89 OTHER INTESTINAL MALABSORPTION: ICD-10-CM

## 2020-04-20 LAB
ANION GAP SERPL CALCULATED.3IONS-SCNC: 11 MMOL/L (ref 4–13)
BUN SERPL-MCNC: 12 MG/DL (ref 5–25)
CALCIUM SERPL-MCNC: 8.4 MG/DL (ref 8.3–10.1)
CHLORIDE SERPL-SCNC: 106 MMOL/L (ref 100–108)
CO2 SERPL-SCNC: 18 MMOL/L (ref 21–32)
CREAT SERPL-MCNC: 0.88 MG/DL (ref 0.6–1.3)
GFR SERPL CREATININE-BSD FRML MDRD: 85 ML/MIN/1.73SQ M
GLUCOSE SERPL-MCNC: 64 MG/DL (ref 65–140)
POTASSIUM SERPL-SCNC: 3.4 MMOL/L (ref 3.5–5.3)
SODIUM SERPL-SCNC: 135 MMOL/L (ref 136–145)

## 2020-04-20 PROCEDURE — 96366 THER/PROPH/DIAG IV INF ADDON: CPT

## 2020-04-20 PROCEDURE — 96372 THER/PROPH/DIAG INJ SC/IM: CPT

## 2020-04-20 PROCEDURE — 96365 THER/PROPH/DIAG IV INF INIT: CPT

## 2020-04-20 PROCEDURE — 80048 BASIC METABOLIC PNL TOTAL CA: CPT | Performed by: INTERNAL MEDICINE

## 2020-04-20 RX ORDER — SODIUM CHLORIDE 9 MG/ML
20 INJECTION, SOLUTION INTRAVENOUS ONCE
Status: DISCONTINUED | OUTPATIENT
Start: 2020-04-20 | End: 2020-04-23 | Stop reason: HOSPADM

## 2020-04-20 RX ORDER — HEPARIN SODIUM (PORCINE) LOCK FLUSH IV SOLN 100 UNIT/ML 100 UNIT/ML
300 SOLUTION INTRAVENOUS ONCE
Status: CANCELLED
Start: 2020-04-23

## 2020-04-20 RX ORDER — CYANOCOBALAMIN 1000 UG/ML
1000 INJECTION INTRAMUSCULAR; SUBCUTANEOUS ONCE
Status: COMPLETED | OUTPATIENT
Start: 2020-04-20 | End: 2020-04-20

## 2020-04-20 RX ORDER — HEPARIN SODIUM (PORCINE) LOCK FLUSH IV SOLN 100 UNIT/ML 100 UNIT/ML
300 SOLUTION INTRAVENOUS ONCE
Status: COMPLETED | OUTPATIENT
Start: 2020-04-20 | End: 2020-04-20

## 2020-04-20 RX ORDER — CYANOCOBALAMIN 1000 UG/ML
1000 INJECTION INTRAMUSCULAR; SUBCUTANEOUS ONCE
Status: CANCELLED | OUTPATIENT
Start: 2020-05-18

## 2020-04-20 RX ORDER — SODIUM CHLORIDE 9 MG/ML
20 INJECTION, SOLUTION INTRAVENOUS ONCE
Status: CANCELLED
Start: 2020-04-23

## 2020-04-20 RX ADMIN — CYANOCOBALAMIN 1000 MCG: 1000 INJECTION, SOLUTION INTRAMUSCULAR; SUBCUTANEOUS at 14:11

## 2020-04-20 RX ADMIN — POTASSIUM CHLORIDE: 149 INJECTION, SOLUTION, CONCENTRATE INTRAVENOUS at 09:53

## 2020-04-20 RX ADMIN — HEPARIN 3 UNITS: 100 SYRINGE at 14:51

## 2020-04-23 ENCOUNTER — HOSPITAL ENCOUNTER (OUTPATIENT)
Dept: INFUSION CENTER | Facility: HOSPITAL | Age: 35
Discharge: HOME | End: 2020-04-23
Attending: INTERNAL MEDICINE
Payer: COMMERCIAL

## 2020-04-23 VITALS
HEART RATE: 84 BPM | SYSTOLIC BLOOD PRESSURE: 115 MMHG | DIASTOLIC BLOOD PRESSURE: 69 MMHG | RESPIRATION RATE: 16 BRPM | TEMPERATURE: 97.6 F

## 2020-04-23 DIAGNOSIS — E87.6 HYPOKALEMIA: Primary | ICD-10-CM

## 2020-04-23 DIAGNOSIS — K90.89 OTHER INTESTINAL MALABSORPTION: ICD-10-CM

## 2020-04-23 LAB
ANION GAP SERPL CALCULATED.3IONS-SCNC: 10 MMOL/L (ref 4–13)
BUN SERPL-MCNC: 14 MG/DL (ref 5–25)
CALCIUM SERPL-MCNC: 8.5 MG/DL (ref 8.3–10.1)
CHLORIDE SERPL-SCNC: 103 MMOL/L (ref 100–108)
CO2 SERPL-SCNC: 23 MMOL/L (ref 21–32)
CREAT SERPL-MCNC: 0.96 MG/DL (ref 0.6–1.3)
GFR SERPL CREATININE-BSD FRML MDRD: 76 ML/MIN/1.73SQ M
GLUCOSE SERPL-MCNC: 79 MG/DL (ref 65–140)
POTASSIUM SERPL-SCNC: 3.3 MMOL/L (ref 3.5–5.3)
SODIUM SERPL-SCNC: 136 MMOL/L (ref 136–145)

## 2020-04-23 PROCEDURE — 96366 THER/PROPH/DIAG IV INF ADDON: CPT

## 2020-04-23 PROCEDURE — 80048 BASIC METABOLIC PNL TOTAL CA: CPT | Performed by: INTERNAL MEDICINE

## 2020-04-23 PROCEDURE — 96365 THER/PROPH/DIAG IV INF INIT: CPT

## 2020-04-23 RX ORDER — POTASSIUM CHLORIDE 20MEQ/15ML
40 LIQUID (ML) ORAL 3 TIMES DAILY
Qty: 4500 ML | Refills: 0
Start: 2020-04-23 | End: 2020-07-13

## 2020-04-23 RX ORDER — HEPARIN SODIUM (PORCINE) LOCK FLUSH IV SOLN 100 UNIT/ML 100 UNIT/ML
300 SOLUTION INTRAVENOUS ONCE
Status: COMPLETED | OUTPATIENT
Start: 2020-04-23 | End: 2020-04-23

## 2020-04-23 RX ORDER — POTASSIUM CHLORIDE 1.5 G/1.77G
40 POWDER, FOR SOLUTION ORAL 2 TIMES DAILY
Qty: 120 PACKET | Refills: 5 | Status: ON HOLD | OUTPATIENT
Start: 2020-04-23 | End: 2020-04-30

## 2020-04-23 RX ORDER — SODIUM CHLORIDE 9 MG/ML
20 INJECTION, SOLUTION INTRAVENOUS ONCE
Status: DISCONTINUED | OUTPATIENT
Start: 2020-04-23 | End: 2020-04-26 | Stop reason: HOSPADM

## 2020-04-23 RX ORDER — SODIUM CHLORIDE 9 MG/ML
20 INJECTION, SOLUTION INTRAVENOUS ONCE
Status: CANCELLED
Start: 2020-04-27

## 2020-04-23 RX ORDER — HEPARIN SODIUM (PORCINE) LOCK FLUSH IV SOLN 100 UNIT/ML 100 UNIT/ML
300 SOLUTION INTRAVENOUS ONCE
Status: CANCELLED
Start: 2020-04-27

## 2020-04-23 RX ADMIN — HEPARIN 3 UNITS: 100 SYRINGE at 14:50

## 2020-04-23 RX ADMIN — POTASSIUM CHLORIDE: 149 INJECTION, SOLUTION, CONCENTRATE INTRAVENOUS at 09:55

## 2020-04-27 ENCOUNTER — HOSPITAL ENCOUNTER (EMERGENCY)
Facility: HOSPITAL | Age: 35
Discharge: HOME | End: 2020-04-27
Attending: FAMILY MEDICINE | Admitting: FAMILY MEDICINE
Payer: COMMERCIAL

## 2020-04-27 ENCOUNTER — HOSPITAL ENCOUNTER (OUTPATIENT)
Dept: INFUSION CENTER | Facility: HOSPITAL | Age: 35
Discharge: HOME | End: 2020-04-27
Attending: INTERNAL MEDICINE
Payer: COMMERCIAL

## 2020-04-27 VITALS
HEIGHT: 62 IN | HEART RATE: 91 BPM | RESPIRATION RATE: 15 BRPM | BODY MASS INDEX: 27.75 KG/M2 | DIASTOLIC BLOOD PRESSURE: 66 MMHG | WEIGHT: 150.79 LBS | OXYGEN SATURATION: 100 % | SYSTOLIC BLOOD PRESSURE: 114 MMHG | TEMPERATURE: 99.2 F

## 2020-04-27 VITALS
DIASTOLIC BLOOD PRESSURE: 60 MMHG | SYSTOLIC BLOOD PRESSURE: 130 MMHG | OXYGEN SATURATION: 98 % | RESPIRATION RATE: 16 BRPM | HEART RATE: 99 BPM | TEMPERATURE: 97.9 F

## 2020-04-27 DIAGNOSIS — K90.89 OTHER INTESTINAL MALABSORPTION: ICD-10-CM

## 2020-04-27 DIAGNOSIS — R11.2 NAUSEA AND VOMITING: ICD-10-CM

## 2020-04-27 DIAGNOSIS — E87.6 HYPOKALEMIA: Primary | ICD-10-CM

## 2020-04-27 LAB
ALBUMIN SERPL BCP-MCNC: 3.8 G/DL (ref 3.5–5)
ALP SERPL-CCNC: 47 U/L (ref 46–116)
ALT SERPL W P-5'-P-CCNC: 22 U/L (ref 12–78)
ANION GAP SERPL CALCULATED.3IONS-SCNC: 10 MMOL/L (ref 4–13)
ANION GAP SERPL CALCULATED.3IONS-SCNC: 12 MMOL/L (ref 4–13)
AST SERPL W P-5'-P-CCNC: 18 U/L (ref 5–45)
BASOPHILS # BLD AUTO: 0.05 THOUSANDS/ΜL (ref 0–0.1)
BASOPHILS NFR BLD AUTO: 1 % (ref 0–1)
BILIRUB SERPL-MCNC: 0.3 MG/DL (ref 0.2–1)
BUN SERPL-MCNC: 14 MG/DL (ref 5–25)
BUN SERPL-MCNC: 14 MG/DL (ref 5–25)
CALCIUM SERPL-MCNC: 8.5 MG/DL (ref 8.3–10.1)
CALCIUM SERPL-MCNC: 8.8 MG/DL (ref 8.3–10.1)
CHLORIDE SERPL-SCNC: 100 MMOL/L (ref 100–108)
CHLORIDE SERPL-SCNC: 100 MMOL/L (ref 100–108)
CO2 SERPL-SCNC: 24 MMOL/L (ref 21–32)
CO2 SERPL-SCNC: 24 MMOL/L (ref 21–32)
CREAT SERPL-MCNC: 0.95 MG/DL (ref 0.6–1.3)
CREAT SERPL-MCNC: 1.13 MG/DL (ref 0.6–1.3)
EOSINOPHIL # BLD AUTO: 0.08 THOUSAND/ΜL (ref 0–0.61)
EOSINOPHIL NFR BLD AUTO: 1 % (ref 0–6)
ERYTHROCYTE [DISTWIDTH] IN BLOOD BY AUTOMATED COUNT: 13.7 % (ref 11.6–15.1)
GFR SERPL CREATININE-BSD FRML MDRD: 63 ML/MIN/1.73SQ M
GFR SERPL CREATININE-BSD FRML MDRD: 77 ML/MIN/1.73SQ M
GLUCOSE SERPL-MCNC: 80 MG/DL (ref 65–140)
GLUCOSE SERPL-MCNC: 83 MG/DL (ref 65–140)
HCT VFR BLD AUTO: 37 % (ref 34.8–46.1)
HGB BLD-MCNC: 12.1 G/DL (ref 11.5–15.4)
IMM GRANULOCYTES # BLD AUTO: 0.01 THOUSAND/UL (ref 0–0.2)
IMM GRANULOCYTES NFR BLD AUTO: 0 % (ref 0–2)
LYMPHOCYTES # BLD AUTO: 2.05 THOUSANDS/ΜL (ref 0.6–4.47)
LYMPHOCYTES NFR BLD AUTO: 32 % (ref 14–44)
MAGNESIUM SERPL-MCNC: 2.5 MG/DL (ref 1.6–2.6)
MCH RBC QN AUTO: 29 PG (ref 26.8–34.3)
MCHC RBC AUTO-ENTMCNC: 32.7 G/DL (ref 31.4–37.4)
MCV RBC AUTO: 89 FL (ref 82–98)
MONOCYTES # BLD AUTO: 0.58 THOUSAND/ΜL (ref 0.17–1.22)
MONOCYTES NFR BLD AUTO: 9 % (ref 4–12)
NEUTROPHILS # BLD AUTO: 3.73 THOUSANDS/ΜL (ref 1.85–7.62)
NEUTS SEG NFR BLD AUTO: 57 % (ref 43–75)
NRBC BLD AUTO-RTO: 0 /100 WBCS
PLATELET # BLD AUTO: 306 THOUSANDS/UL (ref 149–390)
PMV BLD AUTO: 10.3 FL (ref 8.9–12.7)
POTASSIUM SERPL-SCNC: 2.7 MMOL/L (ref 3.5–5.3)
POTASSIUM SERPL-SCNC: 2.9 MMOL/L (ref 3.5–5.3)
PROT SERPL-MCNC: 7.4 G/DL (ref 6.4–8.2)
RBC # BLD AUTO: 4.17 MILLION/UL (ref 3.81–5.12)
SODIUM SERPL-SCNC: 134 MMOL/L (ref 136–145)
SODIUM SERPL-SCNC: 136 MMOL/L (ref 136–145)
TROPONIN I SERPL-MCNC: <0.02 NG/ML
WBC # BLD AUTO: 6.5 THOUSAND/UL (ref 4.31–10.16)

## 2020-04-27 PROCEDURE — 80048 BASIC METABOLIC PNL TOTAL CA: CPT | Performed by: INTERNAL MEDICINE

## 2020-04-27 PROCEDURE — 99284 EMERGENCY DEPT VISIT MOD MDM: CPT | Performed by: PHYSICIAN ASSISTANT

## 2020-04-27 PROCEDURE — 96376 TX/PRO/DX INJ SAME DRUG ADON: CPT

## 2020-04-27 PROCEDURE — 84484 ASSAY OF TROPONIN QUANT: CPT | Performed by: PHYSICIAN ASSISTANT

## 2020-04-27 PROCEDURE — 83735 ASSAY OF MAGNESIUM: CPT | Performed by: PHYSICIAN ASSISTANT

## 2020-04-27 PROCEDURE — 93005 ELECTROCARDIOGRAM TRACING: CPT

## 2020-04-27 PROCEDURE — 96375 TX/PRO/DX INJ NEW DRUG ADDON: CPT

## 2020-04-27 PROCEDURE — 36415 COLL VENOUS BLD VENIPUNCTURE: CPT | Performed by: PHYSICIAN ASSISTANT

## 2020-04-27 PROCEDURE — 96366 THER/PROPH/DIAG IV INF ADDON: CPT

## 2020-04-27 PROCEDURE — 99285 EMERGENCY DEPT VISIT HI MDM: CPT

## 2020-04-27 PROCEDURE — 80053 COMPREHEN METABOLIC PANEL: CPT | Performed by: PHYSICIAN ASSISTANT

## 2020-04-27 PROCEDURE — 96365 THER/PROPH/DIAG IV INF INIT: CPT

## 2020-04-27 PROCEDURE — 85025 COMPLETE CBC W/AUTO DIFF WBC: CPT | Performed by: PHYSICIAN ASSISTANT

## 2020-04-27 RX ORDER — HEPARIN SODIUM (PORCINE) LOCK FLUSH IV SOLN 100 UNIT/ML 100 UNIT/ML
300 SOLUTION INTRAVENOUS ONCE
Status: CANCELLED
Start: 2020-05-02

## 2020-04-27 RX ORDER — POTASSIUM CHLORIDE AND SODIUM CHLORIDE 900; 300 MG/100ML; MG/100ML
125 INJECTION, SOLUTION INTRAVENOUS CONTINUOUS
Status: DISCONTINUED | OUTPATIENT
Start: 2020-04-27 | End: 2020-04-27

## 2020-04-27 RX ORDER — POTASSIUM CHLORIDE 14.9 MG/ML
20 INJECTION INTRAVENOUS
Status: COMPLETED | OUTPATIENT
Start: 2020-04-27 | End: 2020-04-27

## 2020-04-27 RX ORDER — SODIUM CHLORIDE 9 MG/ML
20 INJECTION, SOLUTION INTRAVENOUS ONCE
Status: CANCELLED
Start: 2020-04-30

## 2020-04-27 RX ORDER — METOCLOPRAMIDE HYDROCHLORIDE 5 MG/ML
10 INJECTION INTRAMUSCULAR; INTRAVENOUS ONCE
Status: DISCONTINUED | OUTPATIENT
Start: 2020-04-27 | End: 2020-04-27

## 2020-04-27 RX ORDER — ONDANSETRON 2 MG/ML
4 INJECTION INTRAMUSCULAR; INTRAVENOUS ONCE
Status: COMPLETED | OUTPATIENT
Start: 2020-04-27 | End: 2020-04-27

## 2020-04-27 RX ORDER — DIPHENHYDRAMINE HYDROCHLORIDE 50 MG/ML
25 INJECTION INTRAMUSCULAR; INTRAVENOUS ONCE
Status: DISCONTINUED | OUTPATIENT
Start: 2020-04-27 | End: 2020-04-27

## 2020-04-27 RX ORDER — POTASSIUM CHLORIDE 20MEQ/15ML
20 LIQUID (ML) ORAL ONCE
Status: COMPLETED | OUTPATIENT
Start: 2020-04-27 | End: 2020-04-27

## 2020-04-27 RX ADMIN — POTASSIUM CHLORIDE 20 MEQ: 14.9 INJECTION, SOLUTION INTRAVENOUS at 15:19

## 2020-04-27 RX ADMIN — POTASSIUM CHLORIDE 20 MEQ: 20 SOLUTION ORAL at 11:14

## 2020-04-27 RX ADMIN — POTASSIUM CHLORIDE 20 MEQ: 14.9 INJECTION, SOLUTION INTRAVENOUS at 13:20

## 2020-04-27 RX ADMIN — ONDANSETRON 4 MG: 2 INJECTION INTRAMUSCULAR; INTRAVENOUS at 11:17

## 2020-04-27 RX ADMIN — POTASSIUM CHLORIDE 20 MEQ: 14.9 INJECTION, SOLUTION INTRAVENOUS at 11:19

## 2020-04-30 ENCOUNTER — HOSPITAL ENCOUNTER (OUTPATIENT)
Dept: INFUSION CENTER | Facility: HOSPITAL | Age: 35
Discharge: HOME | End: 2020-04-30
Attending: INTERNAL MEDICINE
Payer: COMMERCIAL

## 2020-04-30 ENCOUNTER — HOSPITAL ENCOUNTER (INPATIENT)
Facility: HOSPITAL | Age: 35
LOS: 2 days | Discharge: HOME | DRG: 641 | End: 2020-05-02
Attending: INTERNAL MEDICINE | Admitting: INTERNAL MEDICINE
Payer: COMMERCIAL

## 2020-04-30 VITALS
TEMPERATURE: 97.2 F | HEART RATE: 84 BPM | RESPIRATION RATE: 18 BRPM | DIASTOLIC BLOOD PRESSURE: 73 MMHG | SYSTOLIC BLOOD PRESSURE: 104 MMHG | OXYGEN SATURATION: 100 %

## 2020-04-30 DIAGNOSIS — E87.6 HYPOKALEMIA: ICD-10-CM

## 2020-04-30 DIAGNOSIS — K90.89 OTHER INTESTINAL MALABSORPTION: ICD-10-CM

## 2020-04-30 DIAGNOSIS — E87.6 HYPOKALEMIA: Primary | ICD-10-CM

## 2020-04-30 DIAGNOSIS — K21.9 GASTROESOPHAGEAL REFLUX DISEASE WITHOUT ESOPHAGITIS: ICD-10-CM

## 2020-04-30 DIAGNOSIS — R11.2 NAUSEA & VOMITING: Primary | ICD-10-CM

## 2020-04-30 LAB
ANION GAP SERPL CALCULATED.3IONS-SCNC: 11 MMOL/L (ref 4–13)
BUN SERPL-MCNC: 14 MG/DL (ref 5–25)
CALCIUM SERPL-MCNC: 8.5 MG/DL (ref 8.3–10.1)
CHLORIDE SERPL-SCNC: 101 MMOL/L (ref 100–108)
CO2 SERPL-SCNC: 23 MMOL/L (ref 21–32)
CREAT SERPL-MCNC: 1.04 MG/DL (ref 0.6–1.3)
GFR SERPL CREATININE-BSD FRML MDRD: 69 ML/MIN/1.73SQ M
GLUCOSE SERPL-MCNC: 91 MG/DL (ref 65–140)
POTASSIUM SERPL-SCNC: 2.6 MMOL/L (ref 3.5–5.3)
POTASSIUM SERPL-SCNC: 2.8 MMOL/L (ref 3.5–5.3)
SODIUM SERPL-SCNC: 135 MMOL/L (ref 136–145)

## 2020-04-30 PROCEDURE — 80048 BASIC METABOLIC PNL TOTAL CA: CPT | Performed by: INTERNAL MEDICINE

## 2020-04-30 PROCEDURE — 84132 ASSAY OF SERUM POTASSIUM: CPT | Performed by: INTERNAL MEDICINE

## 2020-04-30 PROCEDURE — C9113 INJ PANTOPRAZOLE SODIUM, VIA: HCPCS | Performed by: INTERNAL MEDICINE

## 2020-04-30 PROCEDURE — 99222 1ST HOSP IP/OBS MODERATE 55: CPT | Performed by: INTERNAL MEDICINE

## 2020-04-30 RX ORDER — AMITRIPTYLINE HYDROCHLORIDE 50 MG/1
50 TABLET, FILM COATED ORAL
Status: DISCONTINUED | OUTPATIENT
Start: 2020-04-30 | End: 2020-05-02 | Stop reason: HOSPADM

## 2020-04-30 RX ORDER — POTASSIUM CHLORIDE 14.9 MG/ML
20 INJECTION INTRAVENOUS
Status: COMPLETED | OUTPATIENT
Start: 2020-04-30 | End: 2020-04-30

## 2020-04-30 RX ORDER — PANTOPRAZOLE SODIUM 40 MG/1
40 INJECTION, POWDER, FOR SOLUTION INTRAVENOUS EVERY 12 HOURS SCHEDULED
Status: DISCONTINUED | OUTPATIENT
Start: 2020-04-30 | End: 2020-05-02 | Stop reason: HOSPADM

## 2020-04-30 RX ORDER — ONDANSETRON 4 MG/1
4 TABLET, ORALLY DISINTEGRATING ORAL EVERY 6 HOURS PRN
Status: ON HOLD | COMMUNITY
End: 2020-07-27 | Stop reason: CLARIF

## 2020-04-30 RX ORDER — PANTOPRAZOLE SODIUM 40 MG/1
40 TABLET, DELAYED RELEASE ORAL
Status: DISCONTINUED | OUTPATIENT
Start: 2020-04-30 | End: 2020-04-30

## 2020-04-30 RX ORDER — HEPARIN SODIUM (PORCINE) LOCK FLUSH IV SOLN 100 UNIT/ML 100 UNIT/ML
300 SOLUTION INTRAVENOUS ONCE
Status: CANCELLED
Start: 2020-05-04

## 2020-04-30 RX ORDER — THIAMINE MONONITRATE (VIT B1) 100 MG
100 TABLET ORAL DAILY
Status: DISCONTINUED | OUTPATIENT
Start: 2020-04-30 | End: 2020-05-02 | Stop reason: HOSPADM

## 2020-04-30 RX ORDER — SODIUM CHLORIDE 9 MG/ML
20 INJECTION, SOLUTION INTRAVENOUS ONCE
Status: CANCELLED
Start: 2020-05-01

## 2020-04-30 RX ORDER — FERROUS SULFATE 325(65) MG
325 TABLET ORAL EVERY OTHER DAY
Status: DISCONTINUED | OUTPATIENT
Start: 2020-04-30 | End: 2020-05-02 | Stop reason: HOSPADM

## 2020-04-30 RX ORDER — MECLIZINE HYDROCHLORIDE 25 MG/1
25 TABLET ORAL
Status: DISCONTINUED | OUTPATIENT
Start: 2020-04-30 | End: 2020-05-02 | Stop reason: HOSPADM

## 2020-04-30 RX ORDER — SUMATRIPTAN 25 MG/1
100 TABLET, FILM COATED ORAL ONCE AS NEEDED
Status: DISCONTINUED | OUTPATIENT
Start: 2020-04-30 | End: 2020-05-02 | Stop reason: HOSPADM

## 2020-04-30 RX ORDER — SUCRALFATE 1 G/1
1 TABLET ORAL
Status: DISCONTINUED | OUTPATIENT
Start: 2020-04-30 | End: 2020-05-02 | Stop reason: HOSPADM

## 2020-04-30 RX ORDER — TOPIRAMATE 100 MG/1
100 TABLET, FILM COATED ORAL 2 TIMES DAILY
Status: DISCONTINUED | OUTPATIENT
Start: 2020-04-30 | End: 2020-05-02 | Stop reason: HOSPADM

## 2020-04-30 RX ORDER — ONDANSETRON 2 MG/ML
4 INJECTION INTRAMUSCULAR; INTRAVENOUS EVERY 6 HOURS PRN
Status: DISCONTINUED | OUTPATIENT
Start: 2020-04-30 | End: 2020-04-30

## 2020-04-30 RX ORDER — ONDANSETRON 4 MG/1
4 TABLET, ORALLY DISINTEGRATING ORAL EVERY 6 HOURS PRN
Status: DISCONTINUED | OUTPATIENT
Start: 2020-04-30 | End: 2020-05-02 | Stop reason: HOSPADM

## 2020-04-30 RX ORDER — POLYETHYLENE GLYCOL 3350 17 G/17G
17 POWDER, FOR SOLUTION ORAL DAILY PRN
Status: DISCONTINUED | OUTPATIENT
Start: 2020-04-30 | End: 2020-05-02 | Stop reason: HOSPADM

## 2020-04-30 RX ORDER — MAGNESIUM HYDROXIDE/ALUMINUM HYDROXICE/SIMETHICONE 120; 1200; 1200 MG/30ML; MG/30ML; MG/30ML
30 SUSPENSION ORAL EVERY 6 HOURS PRN
Status: DISCONTINUED | OUTPATIENT
Start: 2020-04-30 | End: 2020-05-02 | Stop reason: HOSPADM

## 2020-04-30 RX ORDER — LANSOPRAZOLE 30 MG/1
30 CAPSULE, DELAYED RELEASE ORAL DAILY
COMMUNITY
End: 2020-05-02 | Stop reason: HOSPADM

## 2020-04-30 RX ORDER — POTASSIUM CHLORIDE 20MEQ/15ML
40 LIQUID (ML) ORAL
Status: DISCONTINUED | OUTPATIENT
Start: 2020-04-30 | End: 2020-05-02 | Stop reason: HOSPADM

## 2020-04-30 RX ORDER — ZINC SULFATE 50(220)MG
220 CAPSULE ORAL DAILY
Status: DISCONTINUED | OUTPATIENT
Start: 2020-05-01 | End: 2020-05-02 | Stop reason: HOSPADM

## 2020-04-30 RX ORDER — AMILORIDE HYDROCHLORIDE 5 MG/1
5 TABLET ORAL DAILY
Status: DISCONTINUED | OUTPATIENT
Start: 2020-05-01 | End: 2020-05-02 | Stop reason: HOSPADM

## 2020-04-30 RX ADMIN — POTASSIUM CHLORIDE 20 MEQ: 14.9 INJECTION, SOLUTION INTRAVENOUS at 13:18

## 2020-04-30 RX ADMIN — TOPIRAMATE 100 MG: 100 TABLET, FILM COATED ORAL at 18:30

## 2020-04-30 RX ADMIN — SUCRALFATE 1 G: 1 TABLET ORAL at 21:46

## 2020-04-30 RX ADMIN — AMITRIPTYLINE HYDROCHLORIDE 50 MG: 50 TABLET, FILM COATED ORAL at 21:46

## 2020-04-30 RX ADMIN — PANTOPRAZOLE SODIUM 40 MG: 40 INJECTION, POWDER, FOR SOLUTION INTRAVENOUS at 15:21

## 2020-04-30 RX ADMIN — ONDANSETRON 4 MG: 4 TABLET, ORALLY DISINTEGRATING ORAL at 17:06

## 2020-04-30 RX ADMIN — LIDOCAINE HYDROCHLORIDE 10 ML: 20 SOLUTION ORAL; TOPICAL at 18:30

## 2020-04-30 RX ADMIN — POTASSIUM CHLORIDE 40 MEQ: 20 SOLUTION ORAL at 13:17

## 2020-04-30 RX ADMIN — LIDOCAINE HYDROCHLORIDE 10 ML: 20 SOLUTION ORAL; TOPICAL at 21:46

## 2020-04-30 RX ADMIN — POTASSIUM CHLORIDE 40 MEQ: 20 SOLUTION ORAL at 21:45

## 2020-04-30 RX ADMIN — POTASSIUM CHLORIDE 40 MEQ: 20 SOLUTION ORAL at 18:31

## 2020-04-30 RX ADMIN — MECLIZINE HYDROCHLORIDE 25 MG: 25 TABLET ORAL at 21:46

## 2020-04-30 RX ADMIN — POTASSIUM CHLORIDE: 149 INJECTION, SOLUTION, CONCENTRATE INTRAVENOUS at 17:01

## 2020-04-30 RX ADMIN — POTASSIUM CHLORIDE 20 MEQ: 14.9 INJECTION, SOLUTION INTRAVENOUS at 15:21

## 2020-04-30 RX ADMIN — SUCRALFATE 1 G: 1 TABLET ORAL at 15:21

## 2020-04-30 RX ADMIN — PANTOPRAZOLE SODIUM 40 MG: 40 INJECTION, POWDER, FOR SOLUTION INTRAVENOUS at 21:45

## 2020-05-01 LAB
ANION GAP SERPL CALCULATED.3IONS-SCNC: 8 MMOL/L (ref 4–13)
ATRIAL RATE: 91 BPM
BUN SERPL-MCNC: 11 MG/DL (ref 5–25)
CALCIUM SERPL-MCNC: 7.6 MG/DL (ref 8.3–10.1)
CHLORIDE SERPL-SCNC: 110 MMOL/L (ref 100–108)
CO2 SERPL-SCNC: 22 MMOL/L (ref 21–32)
CREAT SERPL-MCNC: 0.85 MG/DL (ref 0.6–1.3)
GFR SERPL CREATININE-BSD FRML MDRD: 88 ML/MIN/1.73SQ M
GLUCOSE SERPL-MCNC: 77 MG/DL (ref 65–140)
MAGNESIUM SERPL-MCNC: 2.3 MG/DL (ref 1.6–2.6)
P AXIS: 62 DEGREES
PLATELET # BLD AUTO: 244 THOUSANDS/UL (ref 149–390)
PMV BLD AUTO: 10.9 FL (ref 8.9–12.7)
POTASSIUM SERPL-SCNC: 3.1 MMOL/L (ref 3.5–5.3)
POTASSIUM SERPL-SCNC: 3.9 MMOL/L (ref 3.5–5.3)
PR INTERVAL: 122 MS
QRS AXIS: 25 DEGREES
QRSD INTERVAL: 100 MS
QT INTERVAL: 344 MS
QTC INTERVAL: 423 MS
SODIUM SERPL-SCNC: 140 MMOL/L (ref 136–145)
T WAVE AXIS: 66 DEGREES
VENTRICULAR RATE: 91 BPM

## 2020-05-01 PROCEDURE — 93010 ELECTROCARDIOGRAM REPORT: CPT | Performed by: INTERNAL MEDICINE

## 2020-05-01 PROCEDURE — 83735 ASSAY OF MAGNESIUM: CPT | Performed by: INTERNAL MEDICINE

## 2020-05-01 PROCEDURE — 80048 BASIC METABOLIC PNL TOTAL CA: CPT | Performed by: INTERNAL MEDICINE

## 2020-05-01 PROCEDURE — 85049 AUTOMATED PLATELET COUNT: CPT | Performed by: INTERNAL MEDICINE

## 2020-05-01 PROCEDURE — 99232 SBSQ HOSP IP/OBS MODERATE 35: CPT | Performed by: INTERNAL MEDICINE

## 2020-05-01 PROCEDURE — C9113 INJ PANTOPRAZOLE SODIUM, VIA: HCPCS | Performed by: INTERNAL MEDICINE

## 2020-05-01 PROCEDURE — 84132 ASSAY OF SERUM POTASSIUM: CPT | Performed by: INTERNAL MEDICINE

## 2020-05-01 RX ORDER — POTASSIUM CHLORIDE 14.9 MG/ML
20 INJECTION INTRAVENOUS
Status: COMPLETED | OUTPATIENT
Start: 2020-05-02 | End: 2020-05-02

## 2020-05-01 RX ADMIN — ZINC SULFATE 220 MG (50 MG) CAPSULE 220 MG: CAPSULE at 08:17

## 2020-05-01 RX ADMIN — B-COMPLEX W/ C & FOLIC ACID TAB 1 TABLET: TAB at 17:26

## 2020-05-01 RX ADMIN — POTASSIUM CHLORIDE 40 MEQ: 20 SOLUTION ORAL at 17:27

## 2020-05-01 RX ADMIN — POTASSIUM CHLORIDE 40 MEQ: 20 SOLUTION ORAL at 11:24

## 2020-05-01 RX ADMIN — POTASSIUM CHLORIDE 40 MEQ: 20 SOLUTION ORAL at 06:02

## 2020-05-01 RX ADMIN — POTASSIUM CHLORIDE: 149 INJECTION, SOLUTION, CONCENTRATE INTRAVENOUS at 08:56

## 2020-05-01 RX ADMIN — POTASSIUM CHLORIDE 40 MEQ: 20 SOLUTION ORAL at 21:56

## 2020-05-01 RX ADMIN — TOPIRAMATE 100 MG: 100 TABLET, FILM COATED ORAL at 17:27

## 2020-05-01 RX ADMIN — AMITRIPTYLINE HYDROCHLORIDE 50 MG: 50 TABLET, FILM COATED ORAL at 21:56

## 2020-05-01 RX ADMIN — SUCRALFATE 1 G: 1 TABLET ORAL at 11:24

## 2020-05-01 RX ADMIN — SUCRALFATE 1 G: 1 TABLET ORAL at 06:02

## 2020-05-01 RX ADMIN — PANTOPRAZOLE SODIUM 40 MG: 40 INJECTION, POWDER, FOR SOLUTION INTRAVENOUS at 08:18

## 2020-05-01 RX ADMIN — SUCRALFATE 1 G: 1 TABLET ORAL at 17:27

## 2020-05-01 RX ADMIN — LIDOCAINE HYDROCHLORIDE 10 ML: 20 SOLUTION ORAL; TOPICAL at 17:26

## 2020-05-01 RX ADMIN — LIDOCAINE HYDROCHLORIDE 10 ML: 20 SOLUTION ORAL; TOPICAL at 11:24

## 2020-05-01 RX ADMIN — PANTOPRAZOLE SODIUM 40 MG: 40 INJECTION, POWDER, FOR SOLUTION INTRAVENOUS at 20:39

## 2020-05-01 RX ADMIN — LIDOCAINE HYDROCHLORIDE 10 ML: 20 SOLUTION ORAL; TOPICAL at 14:32

## 2020-05-01 RX ADMIN — LIDOCAINE HYDROCHLORIDE 10 ML: 20 SOLUTION ORAL; TOPICAL at 06:02

## 2020-05-01 RX ADMIN — LIDOCAINE HYDROCHLORIDE 10 ML: 20 SOLUTION ORAL; TOPICAL at 21:56

## 2020-05-01 RX ADMIN — AMILORIDE HYDROCLORIDE 5 MG: 5 TABLET ORAL at 08:17

## 2020-05-01 RX ADMIN — POTASSIUM CHLORIDE 40 MEQ: 20 SOLUTION ORAL at 14:33

## 2020-05-01 RX ADMIN — TOPIRAMATE 100 MG: 100 TABLET, FILM COATED ORAL at 08:18

## 2020-05-01 RX ADMIN — MECLIZINE HYDROCHLORIDE 25 MG: 25 TABLET ORAL at 21:56

## 2020-05-01 RX ADMIN — SUCRALFATE 1 G: 1 TABLET ORAL at 21:56

## 2020-05-01 RX ADMIN — THIAMINE HCL TAB 100 MG 100 MG: 100 TAB at 08:18

## 2020-05-02 VITALS
DIASTOLIC BLOOD PRESSURE: 61 MMHG | RESPIRATION RATE: 16 BRPM | WEIGHT: 148.59 LBS | OXYGEN SATURATION: 100 % | SYSTOLIC BLOOD PRESSURE: 95 MMHG | HEIGHT: 62 IN | TEMPERATURE: 97.9 F | BODY MASS INDEX: 27.34 KG/M2 | HEART RATE: 81 BPM

## 2020-05-02 LAB
ANION GAP SERPL CALCULATED.3IONS-SCNC: 10 MMOL/L (ref 4–13)
BUN SERPL-MCNC: 12 MG/DL (ref 5–25)
CALCIUM SERPL-MCNC: 7.9 MG/DL (ref 8.3–10.1)
CHLORIDE SERPL-SCNC: 112 MMOL/L (ref 100–108)
CO2 SERPL-SCNC: 18 MMOL/L (ref 21–32)
CREAT SERPL-MCNC: 0.81 MG/DL (ref 0.6–1.3)
GFR SERPL CREATININE-BSD FRML MDRD: 94 ML/MIN/1.73SQ M
GLUCOSE SERPL-MCNC: 78 MG/DL (ref 65–140)
MAGNESIUM SERPL-MCNC: 2.2 MG/DL (ref 1.6–2.6)
POTASSIUM SERPL-SCNC: 3.8 MMOL/L (ref 3.5–5.3)
POTASSIUM SERPL-SCNC: 4.1 MMOL/L (ref 3.5–5.3)
SODIUM SERPL-SCNC: 140 MMOL/L (ref 136–145)

## 2020-05-02 PROCEDURE — 84132 ASSAY OF SERUM POTASSIUM: CPT | Performed by: INTERNAL MEDICINE

## 2020-05-02 PROCEDURE — C9113 INJ PANTOPRAZOLE SODIUM, VIA: HCPCS | Performed by: INTERNAL MEDICINE

## 2020-05-02 PROCEDURE — 83735 ASSAY OF MAGNESIUM: CPT | Performed by: INTERNAL MEDICINE

## 2020-05-02 PROCEDURE — 80048 BASIC METABOLIC PNL TOTAL CA: CPT | Performed by: INTERNAL MEDICINE

## 2020-05-02 PROCEDURE — 99238 HOSP IP/OBS DSCHRG MGMT 30/<: CPT | Performed by: INTERNAL MEDICINE

## 2020-05-02 RX ORDER — PANTOPRAZOLE SODIUM 40 MG/1
40 TABLET, DELAYED RELEASE ORAL 2 TIMES DAILY
Qty: 60 TABLET | Refills: 0 | Status: SHIPPED | OUTPATIENT
Start: 2020-05-02 | End: 2020-05-18 | Stop reason: SDUPTHER

## 2020-05-02 RX ORDER — SUCRALFATE 1 G/1
1 TABLET ORAL
Qty: 120 TABLET | Refills: 0 | Status: SHIPPED | OUTPATIENT
Start: 2020-05-02 | End: 2020-05-18 | Stop reason: SDUPTHER

## 2020-05-02 RX ORDER — POTASSIUM CHLORIDE 14.9 MG/ML
20 INJECTION INTRAVENOUS
Status: COMPLETED | OUTPATIENT
Start: 2020-05-02 | End: 2020-05-02

## 2020-05-02 RX ADMIN — FERROUS SULFATE TAB 325 MG (65 MG ELEMENTAL FE) 325 MG: 325 (65 FE) TAB at 08:02

## 2020-05-02 RX ADMIN — LIDOCAINE HYDROCHLORIDE 10 ML: 20 SOLUTION ORAL; TOPICAL at 11:37

## 2020-05-02 RX ADMIN — TOPIRAMATE 100 MG: 100 TABLET, FILM COATED ORAL at 08:02

## 2020-05-02 RX ADMIN — POTASSIUM CHLORIDE 20 MEQ: 14.9 INJECTION, SOLUTION INTRAVENOUS at 02:07

## 2020-05-02 RX ADMIN — SUCRALFATE 1 G: 1 TABLET ORAL at 06:00

## 2020-05-02 RX ADMIN — POTASSIUM CHLORIDE 40 MEQ: 20 SOLUTION ORAL at 06:00

## 2020-05-02 RX ADMIN — POTASSIUM CHLORIDE 20 MEQ: 14.9 INJECTION, SOLUTION INTRAVENOUS at 10:31

## 2020-05-02 RX ADMIN — POTASSIUM CHLORIDE 20 MEQ: 14.9 INJECTION, SOLUTION INTRAVENOUS at 08:24

## 2020-05-02 RX ADMIN — LIDOCAINE HYDROCHLORIDE 10 ML: 20 SOLUTION ORAL; TOPICAL at 06:00

## 2020-05-02 RX ADMIN — SUCRALFATE 1 G: 1 TABLET ORAL at 11:37

## 2020-05-02 RX ADMIN — AMILORIDE HYDROCLORIDE 5 MG: 5 TABLET ORAL at 08:02

## 2020-05-02 RX ADMIN — POTASSIUM CHLORIDE 20 MEQ: 14.9 INJECTION, SOLUTION INTRAVENOUS at 00:16

## 2020-05-02 RX ADMIN — ZINC SULFATE 220 MG (50 MG) CAPSULE 220 MG: CAPSULE at 08:02

## 2020-05-02 RX ADMIN — POTASSIUM CHLORIDE 40 MEQ: 20 SOLUTION ORAL at 11:37

## 2020-05-02 RX ADMIN — PANTOPRAZOLE SODIUM 40 MG: 40 INJECTION, POWDER, FOR SOLUTION INTRAVENOUS at 08:02

## 2020-05-02 RX ADMIN — THIAMINE HCL TAB 100 MG 100 MG: 100 TAB at 08:02

## 2020-05-04 ENCOUNTER — HOSPITAL ENCOUNTER (OUTPATIENT)
Dept: INFUSION CENTER | Facility: HOSPITAL | Age: 35
Discharge: HOME | End: 2020-05-04
Attending: INTERNAL MEDICINE
Payer: COMMERCIAL

## 2020-05-04 ENCOUNTER — TELEPHONE (OUTPATIENT)
Dept: NEPHROLOGY | Facility: CLINIC | Age: 35
End: 2020-05-04

## 2020-05-04 VITALS
DIASTOLIC BLOOD PRESSURE: 58 MMHG | HEART RATE: 97 BPM | TEMPERATURE: 98 F | SYSTOLIC BLOOD PRESSURE: 98 MMHG | RESPIRATION RATE: 16 BRPM

## 2020-05-04 DIAGNOSIS — K90.89 OTHER INTESTINAL MALABSORPTION: ICD-10-CM

## 2020-05-04 DIAGNOSIS — E87.6 HYPOKALEMIA: Primary | ICD-10-CM

## 2020-05-04 DIAGNOSIS — E53.8 B12 DEFICIENCY: ICD-10-CM

## 2020-05-04 LAB
ANION GAP SERPL CALCULATED.3IONS-SCNC: 10 MMOL/L (ref 4–13)
BUN SERPL-MCNC: 13 MG/DL (ref 5–25)
CALCIUM SERPL-MCNC: 8.4 MG/DL (ref 8.3–10.1)
CHLORIDE SERPL-SCNC: 110 MMOL/L (ref 100–108)
CO2 SERPL-SCNC: 19 MMOL/L (ref 21–32)
CREAT SERPL-MCNC: 0.96 MG/DL (ref 0.6–1.3)
GFR SERPL CREATININE-BSD FRML MDRD: 76 ML/MIN/1.73SQ M
GLUCOSE SERPL-MCNC: 53 MG/DL (ref 65–140)
POTASSIUM SERPL-SCNC: 4.1 MMOL/L (ref 3.5–5.3)
SODIUM SERPL-SCNC: 139 MMOL/L (ref 136–145)

## 2020-05-04 PROCEDURE — 80048 BASIC METABOLIC PNL TOTAL CA: CPT | Performed by: INTERNAL MEDICINE

## 2020-05-04 PROCEDURE — 96366 THER/PROPH/DIAG IV INF ADDON: CPT

## 2020-05-04 PROCEDURE — 96365 THER/PROPH/DIAG IV INF INIT: CPT

## 2020-05-04 RX ORDER — SODIUM CHLORIDE 9 MG/ML
20 INJECTION, SOLUTION INTRAVENOUS ONCE
Status: CANCELLED
Start: 2020-05-07

## 2020-05-04 RX ORDER — CYANOCOBALAMIN 1000 UG/ML
1000 INJECTION INTRAMUSCULAR; SUBCUTANEOUS
Qty: 1 ML | Refills: 12 | Status: SHIPPED | OUTPATIENT
Start: 2020-05-04

## 2020-05-04 RX ORDER — POTASSIUM CHLORIDE 400 MEQ/1000ML
60 INJECTION, SOLUTION INTRAVENOUS 2 TIMES WEEKLY
Qty: 1200 ML | Refills: 11 | Status: SHIPPED | OUTPATIENT
Start: 2020-05-04 | End: 2020-06-22

## 2020-05-04 RX ORDER — HEPARIN SODIUM (PORCINE) LOCK FLUSH IV SOLN 100 UNIT/ML 100 UNIT/ML
300 SOLUTION INTRAVENOUS ONCE
Status: COMPLETED | OUTPATIENT
Start: 2020-05-04 | End: 2020-05-04

## 2020-05-04 RX ORDER — SODIUM CHLORIDE 9 MG/ML
20 INJECTION, SOLUTION INTRAVENOUS ONCE
Status: DISCONTINUED | OUTPATIENT
Start: 2020-05-04 | End: 2020-05-07 | Stop reason: HOSPADM

## 2020-05-04 RX ORDER — CYANOCOBALAMIN 1000 UG/ML
1000 INJECTION INTRAMUSCULAR; SUBCUTANEOUS ONCE
Qty: 1 ML | Refills: 12 | Status: SHIPPED | OUTPATIENT
Start: 2020-05-04 | End: 2020-05-04 | Stop reason: SDUPTHER

## 2020-05-04 RX ORDER — HEPARIN SODIUM (PORCINE) LOCK FLUSH IV SOLN 100 UNIT/ML 100 UNIT/ML
300 SOLUTION INTRAVENOUS ONCE
Status: CANCELLED
Start: 2020-05-07

## 2020-05-04 RX ADMIN — HEPARIN 3 UNITS: 100 SYRINGE at 14:10

## 2020-05-04 RX ADMIN — POTASSIUM CHLORIDE: 149 INJECTION, SOLUTION, CONCENTRATE INTRAVENOUS at 09:49

## 2020-05-06 ENCOUNTER — TELEPHONE (OUTPATIENT)
Dept: NEPHROLOGY | Facility: CLINIC | Age: 35
End: 2020-05-06

## 2020-05-06 ENCOUNTER — APPOINTMENT (OUTPATIENT)
Dept: LAB | Facility: HOSPITAL | Age: 35
End: 2020-05-06
Attending: INTERNAL MEDICINE
Payer: COMMERCIAL

## 2020-05-06 DIAGNOSIS — E87.6 HYPOKALEMIA: Primary | ICD-10-CM

## 2020-05-06 PROCEDURE — 80048 BASIC METABOLIC PNL TOTAL CA: CPT

## 2020-05-07 ENCOUNTER — HOSPITAL ENCOUNTER (OUTPATIENT)
Dept: INFUSION CENTER | Facility: HOSPITAL | Age: 35
End: 2020-05-07
Attending: INTERNAL MEDICINE

## 2020-05-09 ENCOUNTER — APPOINTMENT (OUTPATIENT)
Dept: LAB | Facility: HOSPITAL | Age: 35
End: 2020-05-09
Attending: INTERNAL MEDICINE
Payer: COMMERCIAL

## 2020-05-11 ENCOUNTER — HOSPITAL ENCOUNTER (OUTPATIENT)
Dept: INFUSION CENTER | Facility: HOSPITAL | Age: 35
Discharge: HOME | End: 2020-05-11
Attending: INTERNAL MEDICINE

## 2020-05-13 ENCOUNTER — APPOINTMENT (OUTPATIENT)
Dept: LAB | Facility: HOSPITAL | Age: 35
End: 2020-05-13
Attending: INTERNAL MEDICINE
Payer: COMMERCIAL

## 2020-05-16 ENCOUNTER — APPOINTMENT (OUTPATIENT)
Dept: LAB | Facility: HOSPITAL | Age: 35
End: 2020-05-16
Attending: INTERNAL MEDICINE
Payer: COMMERCIAL

## 2020-05-18 DIAGNOSIS — K21.9 GASTROESOPHAGEAL REFLUX DISEASE WITHOUT ESOPHAGITIS: ICD-10-CM

## 2020-05-18 RX ORDER — PANTOPRAZOLE SODIUM 40 MG/1
40 TABLET, DELAYED RELEASE ORAL DAILY
Qty: 90 TABLET | Refills: 1 | Status: SHIPPED | OUTPATIENT
Start: 2020-05-18 | End: 2020-08-10 | Stop reason: SDUPTHER

## 2020-05-18 RX ORDER — SUCRALFATE 1 G/1
1 TABLET ORAL DAILY
Qty: 90 TABLET | Refills: 1 | Status: SHIPPED | OUTPATIENT
Start: 2020-05-18 | End: 2020-08-10 | Stop reason: SDUPTHER

## 2020-05-20 ENCOUNTER — APPOINTMENT (OUTPATIENT)
Dept: LAB | Facility: HOSPITAL | Age: 35
End: 2020-05-20
Attending: INTERNAL MEDICINE
Payer: COMMERCIAL

## 2020-05-23 ENCOUNTER — APPOINTMENT (OUTPATIENT)
Dept: LAB | Facility: HOSPITAL | Age: 35
End: 2020-05-23
Attending: INTERNAL MEDICINE
Payer: COMMERCIAL

## 2020-05-27 ENCOUNTER — APPOINTMENT (OUTPATIENT)
Dept: LAB | Facility: HOSPITAL | Age: 35
End: 2020-05-27
Attending: INTERNAL MEDICINE
Payer: COMMERCIAL

## 2020-05-27 LAB
ANION GAP SERPL CALCULATED.3IONS-SCNC: 11 MMOL/L (ref 4–13)
BUN SERPL-MCNC: 15 MG/DL (ref 5–25)
CALCIUM SERPL-MCNC: 8 MG/DL (ref 8.3–10.1)
CHLORIDE SERPL-SCNC: 106 MMOL/L (ref 100–108)
CO2 SERPL-SCNC: 22 MMOL/L (ref 21–32)
CREAT SERPL-MCNC: 1.1 MG/DL (ref 0.6–1.3)
GFR SERPL CREATININE-BSD FRML MDRD: 65 ML/MIN/1.73SQ M
GLUCOSE SERPL-MCNC: 82 MG/DL (ref 65–140)
POTASSIUM SERPL-SCNC: 3.6 MMOL/L (ref 3.5–5.3)
SODIUM SERPL-SCNC: 139 MMOL/L (ref 136–145)

## 2020-05-27 PROCEDURE — 36415 COLL VENOUS BLD VENIPUNCTURE: CPT | Performed by: INTERNAL MEDICINE

## 2020-05-27 PROCEDURE — 80048 BASIC METABOLIC PNL TOTAL CA: CPT | Performed by: INTERNAL MEDICINE

## 2020-05-30 ENCOUNTER — APPOINTMENT (OUTPATIENT)
Dept: LAB | Facility: HOSPITAL | Age: 35
End: 2020-05-30
Attending: INTERNAL MEDICINE
Payer: COMMERCIAL

## 2020-06-01 DIAGNOSIS — E53.8 B12 DEFICIENCY: Primary | ICD-10-CM

## 2020-06-03 ENCOUNTER — TELEPHONE (OUTPATIENT)
Dept: NEPHROLOGY | Facility: CLINIC | Age: 35
End: 2020-06-03

## 2020-06-03 ENCOUNTER — APPOINTMENT (OUTPATIENT)
Dept: LAB | Facility: HOSPITAL | Age: 35
End: 2020-06-03
Attending: INTERNAL MEDICINE
Payer: COMMERCIAL

## 2020-06-03 DIAGNOSIS — E87.6 HYPOKALEMIA: Primary | ICD-10-CM

## 2020-06-03 RX ORDER — POTASSIUM CHLORIDE 29.8 MG/ML
40 INJECTION INTRAVENOUS ONCE
Qty: 100 ML | Refills: 0 | Status: ON HOLD
Start: 2020-06-03 | End: 2020-07-13

## 2020-06-06 ENCOUNTER — APPOINTMENT (OUTPATIENT)
Dept: LAB | Facility: HOSPITAL | Age: 35
End: 2020-06-06
Attending: INTERNAL MEDICINE
Payer: COMMERCIAL

## 2020-06-10 ENCOUNTER — APPOINTMENT (OUTPATIENT)
Dept: LAB | Facility: HOSPITAL | Age: 35
End: 2020-06-10
Attending: INTERNAL MEDICINE
Payer: COMMERCIAL

## 2020-06-11 ENCOUNTER — TELEPHONE (OUTPATIENT)
Dept: NEPHROLOGY | Facility: CLINIC | Age: 35
End: 2020-06-11

## 2020-06-13 ENCOUNTER — APPOINTMENT (OUTPATIENT)
Dept: LAB | Facility: HOSPITAL | Age: 35
End: 2020-06-13
Attending: INTERNAL MEDICINE
Payer: COMMERCIAL

## 2020-06-17 ENCOUNTER — APPOINTMENT (OUTPATIENT)
Dept: LAB | Facility: HOSPITAL | Age: 35
End: 2020-06-17
Attending: INTERNAL MEDICINE
Payer: COMMERCIAL

## 2020-06-19 ENCOUNTER — OFFICE VISIT (OUTPATIENT)
Dept: URGENT CARE | Facility: CLINIC | Age: 35
End: 2020-06-19
Payer: COMMERCIAL

## 2020-06-19 VITALS
SYSTOLIC BLOOD PRESSURE: 114 MMHG | HEART RATE: 68 BPM | TEMPERATURE: 99.9 F | RESPIRATION RATE: 16 BRPM | BODY MASS INDEX: 27.79 KG/M2 | WEIGHT: 151 LBS | DIASTOLIC BLOOD PRESSURE: 67 MMHG | HEIGHT: 62 IN | OXYGEN SATURATION: 98 %

## 2020-06-19 DIAGNOSIS — B86 SCABIES: Primary | ICD-10-CM

## 2020-06-19 PROCEDURE — G0382 LEV 3 HOSP TYPE B ED VISIT: HCPCS | Performed by: EMERGENCY MEDICINE

## 2020-06-19 RX ORDER — METHYLPREDNISOLONE 4 MG/1
4 TABLET ORAL 2 TIMES DAILY
Status: ON HOLD | COMMUNITY
End: 2020-07-13

## 2020-06-19 RX ORDER — PERMETHRIN 50 MG/G
CREAM TOPICAL ONCE
Qty: 60 G | Refills: 1 | Status: SHIPPED | OUTPATIENT
Start: 2020-06-19 | End: 2020-06-19

## 2020-06-19 RX ORDER — BETAMETHASONE DIPROPIONATE 0.5 MG/G
CREAM TOPICAL 2 TIMES DAILY
Status: ON HOLD | COMMUNITY
End: 2020-07-13

## 2020-06-19 RX ORDER — DOXEPIN HYDROCHLORIDE 25 MG/1
25 CAPSULE ORAL
Status: ON HOLD | COMMUNITY
End: 2020-07-13

## 2020-06-20 ENCOUNTER — APPOINTMENT (OUTPATIENT)
Dept: LAB | Facility: HOSPITAL | Age: 35
End: 2020-06-20
Attending: INTERNAL MEDICINE
Payer: COMMERCIAL

## 2020-06-22 ENCOUNTER — TELEPHONE (OUTPATIENT)
Dept: NEPHROLOGY | Facility: CLINIC | Age: 35
End: 2020-06-22

## 2020-06-22 DIAGNOSIS — E87.6 HYPOKALEMIA: ICD-10-CM

## 2020-06-22 RX ORDER — POTASSIUM CHLORIDE 400 MEQ/1000ML
60 INJECTION, SOLUTION INTRAVENOUS 3 TIMES WEEKLY
Qty: 13500 ML | Refills: 11
Start: 2020-06-22 | End: 2020-07-13

## 2020-06-27 ENCOUNTER — APPOINTMENT (OUTPATIENT)
Dept: LAB | Facility: HOSPITAL | Age: 35
End: 2020-06-27
Attending: INTERNAL MEDICINE
Payer: COMMERCIAL

## 2020-07-06 ENCOUNTER — APPOINTMENT (OUTPATIENT)
Dept: LAB | Facility: HOSPITAL | Age: 35
End: 2020-07-06
Attending: INTERNAL MEDICINE
Payer: COMMERCIAL

## 2020-07-06 PROCEDURE — 80048 BASIC METABOLIC PNL TOTAL CA: CPT

## 2020-07-10 ENCOUNTER — TELEPHONE (OUTPATIENT)
Dept: NEPHROLOGY | Facility: CLINIC | Age: 35
End: 2020-07-10

## 2020-07-10 ENCOUNTER — APPOINTMENT (OUTPATIENT)
Dept: LAB | Facility: HOSPITAL | Age: 35
End: 2020-07-10
Attending: INTERNAL MEDICINE
Payer: COMMERCIAL

## 2020-07-10 NOTE — TELEPHONE ENCOUNTER
Patient called back she states that she has been going for infusions 3 times a week  She states that she has been getting numbness and tingling in her face and thighs  She is asking for you to put a script in for a 4th bag for her  She is also aware that if she feels worse over the weekend to please proceed to the nearest ER

## 2020-07-10 NOTE — TELEPHONE ENCOUNTER
Labs reviewed, potassium is low at 2 8, as the patient experiencing any symptoms, has she been getting her potassium infusions 3 times a week?

## 2020-07-13 ENCOUNTER — TELEPHONE (OUTPATIENT)
Dept: NEPHROLOGY | Facility: CLINIC | Age: 35
End: 2020-07-13

## 2020-07-13 ENCOUNTER — HOSPITAL ENCOUNTER (INPATIENT)
Facility: HOSPITAL | Age: 35
LOS: 2 days | Discharge: HOME | DRG: 641 | End: 2020-07-15
Attending: INTERNAL MEDICINE | Admitting: INTERNAL MEDICINE
Payer: COMMERCIAL

## 2020-07-13 ENCOUNTER — APPOINTMENT (OUTPATIENT)
Dept: LAB | Facility: HOSPITAL | Age: 35
End: 2020-07-13
Attending: INTERNAL MEDICINE
Payer: COMMERCIAL

## 2020-07-13 DIAGNOSIS — E87.6 HYPOKALEMIA: Primary | ICD-10-CM

## 2020-07-13 DIAGNOSIS — E87.6 HYPOKALEMIA: ICD-10-CM

## 2020-07-13 LAB
ANION GAP SERPL CALCULATED.3IONS-SCNC: 11 MMOL/L (ref 4–13)
ANION GAP SERPL CALCULATED.3IONS-SCNC: 12 MMOL/L (ref 4–13)
BUN SERPL-MCNC: 12 MG/DL (ref 5–25)
BUN SERPL-MCNC: 13 MG/DL (ref 5–25)
CALCIUM SERPL-MCNC: 7.9 MG/DL (ref 8.3–10.1)
CALCIUM SERPL-MCNC: 8.5 MG/DL (ref 8.3–10.1)
CHLORIDE SERPL-SCNC: 103 MMOL/L (ref 100–108)
CHLORIDE SERPL-SCNC: 105 MMOL/L (ref 100–108)
CO2 SERPL-SCNC: 20 MMOL/L (ref 21–32)
CO2 SERPL-SCNC: 21 MMOL/L (ref 21–32)
CREAT SERPL-MCNC: 0.95 MG/DL (ref 0.6–1.3)
CREAT SERPL-MCNC: 1 MG/DL (ref 0.6–1.3)
GFR SERPL CREATININE-BSD FRML MDRD: 73 ML/MIN/1.73SQ M
GFR SERPL CREATININE-BSD FRML MDRD: 77 ML/MIN/1.73SQ M
GLUCOSE SERPL-MCNC: 81 MG/DL (ref 65–140)
GLUCOSE SERPL-MCNC: 87 MG/DL (ref 65–140)
INR PPP: 0.99 (ref 0.84–1.19)
MAGNESIUM SERPL-MCNC: 2.4 MG/DL (ref 1.6–2.6)
POTASSIUM SERPL-SCNC: 2.4 MMOL/L (ref 3.5–5.3)
POTASSIUM SERPL-SCNC: 3.4 MMOL/L (ref 3.5–5.3)
PROTHROMBIN TIME: 13.1 SECONDS (ref 11.6–14.5)
SODIUM SERPL-SCNC: 136 MMOL/L (ref 136–145)
SODIUM SERPL-SCNC: 136 MMOL/L (ref 136–145)

## 2020-07-13 PROCEDURE — 99219 PR INITIAL OBSERVATION CARE/DAY 50 MINUTES: CPT | Performed by: INTERNAL MEDICINE

## 2020-07-13 PROCEDURE — G0379 DIRECT REFER HOSPITAL OBSERV: HCPCS

## 2020-07-13 PROCEDURE — 85610 PROTHROMBIN TIME: CPT | Performed by: INTERNAL MEDICINE

## 2020-07-13 PROCEDURE — 99244 OFF/OP CNSLTJ NEW/EST MOD 40: CPT | Performed by: NURSE PRACTITIONER

## 2020-07-13 PROCEDURE — 80048 BASIC METABOLIC PNL TOTAL CA: CPT | Performed by: INTERNAL MEDICINE

## 2020-07-13 PROCEDURE — 83735 ASSAY OF MAGNESIUM: CPT | Performed by: INTERNAL MEDICINE

## 2020-07-13 PROCEDURE — 80048 BASIC METABOLIC PNL TOTAL CA: CPT | Performed by: NURSE PRACTITIONER

## 2020-07-13 PROCEDURE — 80048 BASIC METABOLIC PNL TOTAL CA: CPT

## 2020-07-13 RX ORDER — SODIUM CHLORIDE AND POTASSIUM CHLORIDE .9; .15 G/100ML; G/100ML
125 SOLUTION INTRAVENOUS CONTINUOUS
Status: DISCONTINUED | OUTPATIENT
Start: 2020-07-13 | End: 2020-07-13

## 2020-07-13 RX ORDER — PANTOPRAZOLE SODIUM 40 MG/1
40 TABLET, DELAYED RELEASE ORAL
Status: DISCONTINUED | OUTPATIENT
Start: 2020-07-14 | End: 2020-07-15 | Stop reason: HOSPADM

## 2020-07-13 RX ORDER — AMITRIPTYLINE HYDROCHLORIDE 50 MG/1
50 TABLET, FILM COATED ORAL
Status: DISCONTINUED | OUTPATIENT
Start: 2020-07-13 | End: 2020-07-15 | Stop reason: HOSPADM

## 2020-07-13 RX ORDER — AMILORIDE HYDROCHLORIDE 5 MG/1
5 TABLET ORAL DAILY
Status: DISCONTINUED | OUTPATIENT
Start: 2020-07-13 | End: 2020-07-15 | Stop reason: HOSPADM

## 2020-07-13 RX ORDER — POTASSIUM CHLORIDE 14.9 MG/ML
20 INJECTION INTRAVENOUS
Status: COMPLETED | OUTPATIENT
Start: 2020-07-13 | End: 2020-07-14

## 2020-07-13 RX ORDER — TOPIRAMATE 100 MG/1
100 TABLET, FILM COATED ORAL 2 TIMES DAILY
Status: DISCONTINUED | OUTPATIENT
Start: 2020-07-13 | End: 2020-07-15 | Stop reason: HOSPADM

## 2020-07-13 RX ORDER — POTASSIUM CHLORIDE 20MEQ/15ML
40 LIQUID (ML) ORAL
Start: 2020-07-13 | End: 2020-07-30 | Stop reason: HOSPADM

## 2020-07-13 RX ORDER — POTASSIUM CHLORIDE 14.9 MG/ML
20 INJECTION INTRAVENOUS
Status: DISCONTINUED | OUTPATIENT
Start: 2020-07-13 | End: 2020-07-13

## 2020-07-13 RX ORDER — SODIUM CHLORIDE, SODIUM GLUCONATE, SODIUM ACETATE, POTASSIUM CHLORIDE, MAGNESIUM CHLORIDE, SODIUM PHOSPHATE, DIBASIC, AND POTASSIUM PHOSPHATE .53; .5; .37; .037; .03; .012; .00082 G/100ML; G/100ML; G/100ML; G/100ML; G/100ML; G/100ML; G/100ML
100 INJECTION, SOLUTION INTRAVENOUS CONTINUOUS
Status: DISCONTINUED | OUTPATIENT
Start: 2020-07-13 | End: 2020-07-15 | Stop reason: HOSPADM

## 2020-07-13 RX ORDER — PANTOPRAZOLE SODIUM 40 MG/1
40 TABLET, DELAYED RELEASE ORAL DAILY
Status: DISCONTINUED | OUTPATIENT
Start: 2020-07-14 | End: 2020-07-13 | Stop reason: SDUPTHER

## 2020-07-13 RX ORDER — SUCRALFATE 1 G/1
1 TABLET ORAL
Status: DISCONTINUED | OUTPATIENT
Start: 2020-07-13 | End: 2020-07-15 | Stop reason: HOSPADM

## 2020-07-13 RX ORDER — POTASSIUM CHLORIDE 20MEQ/15ML
40 LIQUID (ML) ORAL
Status: DISCONTINUED | OUTPATIENT
Start: 2020-07-13 | End: 2020-07-15 | Stop reason: HOSPADM

## 2020-07-13 RX ORDER — POTASSIUM CHLORIDE 400 MEQ/1000ML
80 INJECTION, SOLUTION INTRAVENOUS 3 TIMES WEEKLY
Qty: 13500 ML | Refills: 11 | Status: ON HOLD
Start: 2020-07-13 | End: 2020-08-21 | Stop reason: SDUPTHER

## 2020-07-13 RX ADMIN — POTASSIUM CHLORIDE 20 MEQ: 14.9 INJECTION, SOLUTION INTRAVENOUS at 23:35

## 2020-07-13 RX ADMIN — AMITRIPTYLINE HYDROCHLORIDE 50 MG: 50 TABLET, FILM COATED ORAL at 21:16

## 2020-07-13 RX ADMIN — POTASSIUM CHLORIDE 20 MEQ: 14.9 INJECTION, SOLUTION INTRAVENOUS at 19:12

## 2020-07-13 RX ADMIN — POTASSIUM CHLORIDE 20 MEQ: 14.9 INJECTION, SOLUTION INTRAVENOUS at 17:00

## 2020-07-13 RX ADMIN — TOPIRAMATE 100 MG: 100 TABLET, FILM COATED ORAL at 21:16

## 2020-07-13 RX ADMIN — POTASSIUM CHLORIDE 40 MEQ: 20 SOLUTION ORAL at 21:16

## 2020-07-13 RX ADMIN — SODIUM CHLORIDE, SODIUM GLUCONATE, SODIUM ACETATE, POTASSIUM CHLORIDE, MAGNESIUM CHLORIDE, SODIUM PHOSPHATE, DIBASIC, AND POTASSIUM PHOSPHATE 100 ML/HR: .53; .5; .37; .037; .03; .012; .00082 INJECTION, SOLUTION INTRAVENOUS at 16:05

## 2020-07-13 RX ADMIN — POTASSIUM CHLORIDE 20 MEQ: 14.9 INJECTION, SOLUTION INTRAVENOUS at 14:50

## 2020-07-13 RX ADMIN — POTASSIUM CHLORIDE 40 MEQ: 20 SOLUTION ORAL at 18:18

## 2020-07-13 RX ADMIN — AMILORIDE HYDROCLORIDE 5 MG: 5 TABLET ORAL at 18:18

## 2020-07-13 RX ADMIN — POTASSIUM CHLORIDE 40 MEQ: 20 SOLUTION ORAL at 14:50

## 2020-07-13 RX ADMIN — POTASSIUM CHLORIDE 20 MEQ: 14.9 INJECTION, SOLUTION INTRAVENOUS at 21:16

## 2020-07-13 RX ADMIN — SUCRALFATE 1 G: 1 TABLET ORAL at 16:03

## 2020-07-13 NOTE — TELEPHONE ENCOUNTER
Patient aware  She gave me the number for the speciality pharmacy at [phone]  Spoke with them and got their fax number  Fax the script to them at [phone]

## 2020-07-13 NOTE — TELEPHONE ENCOUNTER
Order in chart for 4 bags with each infusion, please forward  Please inform her that her K this AM remains low

## 2020-07-13 NOTE — PLAN OF CARE
Problem: METABOLIC, FLUID AND ELECTROLYTES - ADULT  Goal: Electrolytes maintained within normal limits  Description  INTERVENTIONS:  - Monitor labs and assess patient for signs and symptoms of electrolyte imbalances  - Administer electrolyte replacement as ordered  - Monitor response to electrolyte replacements, including repeat lab results as appropriate  - Instruct patient on fluid and nutrition as appropriate  Outcome: Progressing     Problem: PAIN - ADULT  Goal: Verbalizes/displays adequate comfort level or baseline comfort level  Description  Interventions:  - Encourage patient to monitor pain and request assistance  - Assess pain using appropriate pain scale  - Administer analgesics based on type and severity of pain and evaluate response  - Implement non-pharmacological measures as appropriate and evaluate response  - Consider cultural and social influences on pain and pain management  - Notify physician/advanced practitioner if interventions unsuccessful or patient reports new pain  Outcome: Progressing     Problem: INFECTION - ADULT  Goal: Absence or prevention of progression during hospitalization  Description  INTERVENTIONS:  - Assess and monitor for signs and symptoms of infection  - Monitor lab/diagnostic results  - Monitor all insertion sites, i e  indwelling lines, tubes, and drains  - [place] appropriate cooling/warming therapies per order  - Administer medications as ordered  - Instruct and encourage patient and family to use good hand hygiene technique  - Identify and instruct in appropriate isolation precautions for identified infection/condition   Outcome: Progressing     Problem: SAFETY ADULT  Goal: Patient will remain free of falls  Description  INTERVENTIONS:  - Assess patient frequently for physical needs  -  Identify cognitive and physical deficits and behaviors that affect risk of falls    -  [place] fall precautions as indicated by assessment   - Educate patient/family on patient safety including physical limitations  - Instruct patient to call for assistance with activity based on assessment  - Modify environment to reduce risk of injury  - Consider OT/PT consult to assist with strengthening/mobility  Outcome: Progressing  Goal: Maintain or return to baseline ADL function  Description  INTERVENTIONS:  -  Assess patient's ability to carry out ADLs; assess patient's baseline for ADL function and identify physical deficits which impact ability to perform ADLs (bathing, care of mouth/teeth, toileting, grooming, dressing, etc )  - Assess/evaluate cause of self-care deficits   - Assess range of motion  - Assess patient's mobility; develop plan if impaired  - Assess patient's need for assistive devices and provide as appropriate  - Encourage maximum independence but intervene and supervise when necessary  - Involve family in performance of ADLs  - Assess for home care needs following discharge   - Consider OT consult to assist with ADL evaluation and planning for discharge  - Provide patient education as appropriate  Outcome: Progressing  Goal: Maintain or return mobility status to optimal level  Description  INTERVENTIONS:  - Assess patient's baseline mobility status (ambulation, transfers, stairs, etc )    - Identify cognitive and physical deficits and behaviors that affect mobility  - Identify mobility aids required to assist with transfers and/or ambulation (gait belt, sit-to-stand, lift, walker, cane, etc )  - [place] fall precautions as indicated by assessment  - Record patient progress and toleration of activity level on Mobility SBAR; progress patient to next Phase/Stage  - Instruct patient to call for assistance with activity based on assessment  - Consider rehabilitation consult to assist with strengthening/weightbearing, etc   Outcome: Progressing     Problem: DISCHARGE PLANNING  Goal: Discharge to home or other facility with appropriate resources  Description  INTERVENTIONS:  - Identify barriers to discharge w/patient and caregiver  - Arrange for needed discharge resources and transportation as appropriate  - Identify discharge learning needs (meds, wound care, etc )  - Arrange for interpretive services to assist at discharge as needed  - Refer to Case Management Department for coordinating discharge planning if the patient needs post-hospital services based on physician/advanced practitioner order or complex needs related to functional status, cognitive ability, or social support system  Outcome: Progressing     Problem: Knowledge Deficit  Goal: Patient/family/caregiver demonstrates understanding of disease process, treatment plan, medications, and discharge instructions  Description  Complete learning assessment and assess knowledge base    Interventions:  - Provide teaching at level of understanding  - Provide teaching via preferred learning methods  Outcome: Progressing

## 2020-07-13 NOTE — CONSULTS
Rah Patino 38 y o  female MRN: [id number]  Unit/Bed#: 422-07 Encounter: [id number]        Assessment and Plan:    1  Acute on Chronic Hypokalemia  · Receives infusions in the outpatient setting typically thrice weekly but recently increased to 4 times weekly due to symptomatic hypokalemia  She received addition infusion of 60 mEq potassium 7/11 and 7/12  Still symptomatic today therefore directly admitted for potassium infusion and closer monitoring   · She has undergone extensive work-up in past for wasting syndromes etc  And her records were sent to [facility]  · Will restart her potassium-sparing diuretic, liquid potassium supplementation and give 20 mEq x 4 and isolyte @ 100 mL/hr  Recheck labs at 2200 and replete as necessary for goal K > 4 0 mmol/L  · Will continue PPI and Carafate therapy at this time   2  Anion Gap Metabolic Acidosis  · This has happened in the past  Will treat with Isolyte @ 100 mL/hr  She denies nausea/vomiting/diarrhea  She has previously required bicarbonate tablets but was successfully weaned off  3  Iron Deficiency Anemia  4  S/p gastric Bypass        HPI:    Buster Petersen is a 38 y o  female with an active problem list significant for acute on chronic hypokalemia treated in the outpatient settings with infusions, migraine, remote gastric bypass surgery who is well known to this service  She was directly admitted today from home for symptomatic hypokalemia  She typically receives thrice weekly infusions of potassium chloride however her potassium began to decline last week and her potassium infusions were increased to four times weekly  She also received additional potassium (60 mEq per dose) on 7/11 and 7/12 at the advice of her primary nephrologist  She remained symptomatic today "body vibrating/weakness" with a potassium of 2 7 mmol/L and was directly admitted       Upon discussion with the Ama Vargas she states she has been in her usual state of health and that things are going "pretty good " her  is an essential worker and there are no money problems, no financial stressors or emotional stressors in her life right now  She has been eating and drinking well, no nausea, vomiting, diarrhea  She has had no febrile illness, no sick contacts, no recent diarrhea or constipation  She does not have any urinary complaints  During her most recent admission in May she was initiated on treatment for GERD with PPI and Carafate therapy  She denies having any issues with the liquid potassium and all of her symptoms have resolved in that regard  She is currently waiting to hear back from  [facility]  Her records have been sent and per her last communication with them things are in process  The medical record, including Care Everywhere and media tabs were reviewed  Reason for Consult: Hypokalemia     Review of Systems:  A complete 10-point review of systems was performed  Aside from what was mentioned in the HPI, it is otherwise negative  Historical Information   Past Medical History:   Diagnosis Date    H  pylori infection     Hypokalemia     Migraine     Renal disorder     Rhabdomyolysis      Past Surgical History:   Procedure Laterality Date    ABDOMINAL SURGERY      APPENDECTOMY       SECTION      CHOLECYSTECTOMY      COSMETIC SURGERY      FL GUIDED CENTRAL VENOUS ACCESS DEVICE INSERTION  2018    GASTRIC BYPASS      HYSTERECTOMY      TUNNELED VENOUS PORT PLACEMENT N/A 2018    Procedure: INSERTION VENOUS PORT (PORT-A-CATH);   Surgeon: Leelee Marcelino DO;  Location: [facility];  Service: General     Social History   Social History     Substance and Sexual Activity   Alcohol Use Never    Alcohol/week: 0 0 standard drinks    Frequency: Never    Drinks per session: Patient refused    Binge frequency: Never    Comment: 0     Social History     Substance and Sexual Activity   Drug Use No     Social History     Tobacco Use   Smoking Status Never Smoker   Smokeless Tobacco Never Used       Family History:   Family History   Problem Relation Age of Onset    No Known Problems Mother     Hypertension Father     Diabetes Father     Diabetes Maternal Grandfather        Medications:  Pertinent medications were reviewed    Current Facility-Administered Medications:  AMILoride 5 mg Oral Daily Marily Denilson, CRNP   multi-electrolyte 100 mL/hr Intravenous Continuous Marily Denilson, CRNP   [START ON 7/14/2020] pantoprazole 40 mg Oral Early Morning Marily Denilson, CRNP   potassium chloride 40 mEq Oral 5x Daily Marily Denilson, CRNP   potassium chloride 20 mEq Intravenous Q2H Giovana Davidson, CRNP   sucralfate 1 g Oral BID AC Marily Denilson, CRNP         Allergies   Allergen Reactions    Nsaids Other (See Comments)     Other reaction(s): Other (Please comment)  Should not take s/p bariatric surgery  Other reaction(s): Other (See Comments)  Should not take as per prior records  Should not take s/p bariatric surgery  Has hx of gastric bypass      Prednisone      Nausea, vomiting, diarrhea         Vitals: There were no vitals taken for this visit    There is no height or weight on file to calculate BMI    ,    ,        No intake or output data in the 24 hours ending 07/13/20 1537  Invasive Devices     Central Venous Catheter Line            Port A Cath 12/22/18 Right Chest 569 days                Physical Exam:  General: conscious, cooperative, in no acute distress   Eyes: conjunctivae pink, anicteric sclerae  ENT: lips and mucous membranes moist  Neck: supple, no JVD, no masses  Chest: clear breath sounds bilateral, no crackles, ronchus or wheezings  CVS: S1 & S2, normal rate, regular rhythm  Abdomen: soft, non-tender, non-distended, normoactive bowel sounds  Extremities: no edema of both legs  Skin: no rash  Neuro: awake, alert, oriented      Diagnostic Data:  Lab: I have personally reviewed pertinent lab results  ,   CBC:       CMP: Lab Results   Component Value Date    SODIUM 136 07/13/2020    K 2 7 (LL) 07/13/2020     07/13/2020    CO2 19 (L) 07/13/2020    BUN 13 07/13/2020    CREATININE 1 22 07/13/2020    CALCIUM 8 7 07/13/2020    EGFR 57 07/13/2020   ,   PT/INR: No results found for: PT, INR,   Magnesium: No components found for: MAG,  Phosphorous: No results found for: PHOS    Microbiology:  @LABRCNTIP,(urinecx:7)@        ARIADNA [place]    Portions of the record may have been created with voice recognition software  Occasional wrong word or "sound a like" substitutions may have occurred due to the inherent limitations of voice recognition software  Read the chart carefully and recognize, using context, where substitutions have occurred

## 2020-07-14 LAB
ANION GAP SERPL CALCULATED.3IONS-SCNC: 10 MMOL/L (ref 4–13)
ANION GAP SERPL CALCULATED.3IONS-SCNC: 11 MMOL/L (ref 4–13)
ANION GAP SERPL CALCULATED.3IONS-SCNC: 12 MMOL/L (ref 4–13)
BACTERIA UR QL AUTO: ABNORMAL /HPF
BASOPHILS # BLD AUTO: 0.08 THOUSANDS/ΜL (ref 0–0.1)
BASOPHILS NFR BLD AUTO: 1 % (ref 0–1)
BILIRUB UR QL STRIP: NEGATIVE
BUN SERPL-MCNC: 10 MG/DL (ref 5–25)
BUN SERPL-MCNC: 10 MG/DL (ref 5–25)
BUN SERPL-MCNC: 9 MG/DL (ref 5–25)
CALCIUM SERPL-MCNC: 7.5 MG/DL (ref 8.3–10.1)
CALCIUM SERPL-MCNC: 7.6 MG/DL (ref 8.3–10.1)
CALCIUM SERPL-MCNC: 7.9 MG/DL (ref 8.3–10.1)
CHLORIDE SERPL-SCNC: 108 MMOL/L (ref 100–108)
CHLORIDE SERPL-SCNC: 108 MMOL/L (ref 100–108)
CHLORIDE SERPL-SCNC: 109 MMOL/L (ref 100–108)
CLARITY UR: ABNORMAL
CO2 SERPL-SCNC: 17 MMOL/L (ref 21–32)
CO2 SERPL-SCNC: 17 MMOL/L (ref 21–32)
CO2 SERPL-SCNC: 20 MMOL/L (ref 21–32)
COLOR UR: YELLOW
CREAT SERPL-MCNC: 0.9 MG/DL (ref 0.6–1.3)
CREAT SERPL-MCNC: 0.91 MG/DL (ref 0.6–1.3)
CREAT SERPL-MCNC: 0.98 MG/DL (ref 0.6–1.3)
EOSINOPHIL # BLD AUTO: 0.15 THOUSAND/ΜL (ref 0–0.61)
EOSINOPHIL NFR BLD AUTO: 3 % (ref 0–6)
ERYTHROCYTE [DISTWIDTH] IN BLOOD BY AUTOMATED COUNT: 15 % (ref 11.6–15.1)
FERRITIN SERPL-MCNC: 5 NG/ML (ref 8–388)
GFR SERPL CREATININE-BSD FRML MDRD: 74 ML/MIN/1.73SQ M
GFR SERPL CREATININE-BSD FRML MDRD: 81 ML/MIN/1.73SQ M
GFR SERPL CREATININE-BSD FRML MDRD: 83 ML/MIN/1.73SQ M
GLUCOSE SERPL-MCNC: 160 MG/DL (ref 65–140)
GLUCOSE SERPL-MCNC: 80 MG/DL (ref 65–140)
GLUCOSE SERPL-MCNC: 92 MG/DL (ref 65–140)
GLUCOSE UR STRIP-MCNC: NEGATIVE MG/DL
HCT VFR BLD AUTO: 33.3 % (ref 34.8–46.1)
HGB BLD-MCNC: 10.5 G/DL (ref 11.5–15.4)
HGB UR QL STRIP.AUTO: NEGATIVE
IMM GRANULOCYTES # BLD AUTO: 0.01 THOUSAND/UL (ref 0–0.2)
IMM GRANULOCYTES NFR BLD AUTO: 0 % (ref 0–2)
IRON SATN MFR SERPL: 9 %
IRON SERPL-MCNC: 30 UG/DL (ref 50–170)
KETONES UR STRIP-MCNC: NEGATIVE MG/DL
LEUKOCYTE ESTERASE UR QL STRIP: NEGATIVE
LYMPHOCYTES # BLD AUTO: 2.1 THOUSANDS/ΜL (ref 0.6–4.47)
LYMPHOCYTES NFR BLD AUTO: 38 % (ref 14–44)
MAGNESIUM SERPL-MCNC: 2.4 MG/DL (ref 1.6–2.6)
MCH RBC QN AUTO: 26.8 PG (ref 26.8–34.3)
MCHC RBC AUTO-ENTMCNC: 31.5 G/DL (ref 31.4–37.4)
MCV RBC AUTO: 85 FL (ref 82–98)
MONOCYTES # BLD AUTO: 0.49 THOUSAND/ΜL (ref 0.17–1.22)
MONOCYTES NFR BLD AUTO: 9 % (ref 4–12)
NEUTROPHILS # BLD AUTO: 2.75 THOUSANDS/ΜL (ref 1.85–7.62)
NEUTS SEG NFR BLD AUTO: 49 % (ref 43–75)
NITRITE UR QL STRIP: NEGATIVE
NON-SQ EPI CELLS URNS QL MICRO: ABNORMAL /HPF
NRBC BLD AUTO-RTO: 0 /100 WBCS
PH UR STRIP.AUTO: 6 [PH]
PHOSPHATE SERPL-MCNC: 3.3 MG/DL (ref 2.7–4.5)
PLATELET # BLD AUTO: 263 THOUSANDS/UL (ref 149–390)
PMV BLD AUTO: 11.1 FL (ref 8.9–12.7)
POTASSIUM SERPL-SCNC: 3.4 MMOL/L (ref 3.5–5.3)
POTASSIUM SERPL-SCNC: 3.5 MMOL/L (ref 3.5–5.3)
POTASSIUM SERPL-SCNC: 4 MMOL/L (ref 3.5–5.3)
PROT UR STRIP-MCNC: NEGATIVE MG/DL
RBC # BLD AUTO: 3.92 MILLION/UL (ref 3.81–5.12)
RBC #/AREA URNS AUTO: ABNORMAL /HPF
SODIUM SERPL-SCNC: 137 MMOL/L (ref 136–145)
SODIUM SERPL-SCNC: 137 MMOL/L (ref 136–145)
SODIUM SERPL-SCNC: 138 MMOL/L (ref 136–145)
SP GR UR STRIP.AUTO: 1.02 (ref 1–1.03)
TIBC SERPL-MCNC: 325 UG/DL (ref 250–450)
UROBILINOGEN UR QL STRIP.AUTO: 0.2 E.U./DL
WBC # BLD AUTO: 5.58 THOUSAND/UL (ref 4.31–10.16)
WBC #/AREA URNS AUTO: ABNORMAL /HPF

## 2020-07-14 PROCEDURE — 87086 URINE CULTURE/COLONY COUNT: CPT | Performed by: INTERNAL MEDICINE

## 2020-07-14 PROCEDURE — 84100 ASSAY OF PHOSPHORUS: CPT | Performed by: INTERNAL MEDICINE

## 2020-07-14 PROCEDURE — 83735 ASSAY OF MAGNESIUM: CPT | Performed by: INTERNAL MEDICINE

## 2020-07-14 PROCEDURE — 80048 BASIC METABOLIC PNL TOTAL CA: CPT | Performed by: NURSE PRACTITIONER

## 2020-07-14 PROCEDURE — 80048 BASIC METABOLIC PNL TOTAL CA: CPT | Performed by: INTERNAL MEDICINE

## 2020-07-14 PROCEDURE — 99232 SBSQ HOSP IP/OBS MODERATE 35: CPT | Performed by: INTERNAL MEDICINE

## 2020-07-14 PROCEDURE — 83550 IRON BINDING TEST: CPT | Performed by: NURSE PRACTITIONER

## 2020-07-14 PROCEDURE — 83540 ASSAY OF IRON: CPT | Performed by: NURSE PRACTITIONER

## 2020-07-14 PROCEDURE — 81001 URINALYSIS AUTO W/SCOPE: CPT | Performed by: INTERNAL MEDICINE

## 2020-07-14 PROCEDURE — 85025 COMPLETE CBC W/AUTO DIFF WBC: CPT | Performed by: INTERNAL MEDICINE

## 2020-07-14 PROCEDURE — 82728 ASSAY OF FERRITIN: CPT | Performed by: NURSE PRACTITIONER

## 2020-07-14 PROCEDURE — 99214 OFFICE O/P EST MOD 30 MIN: CPT | Performed by: INTERNAL MEDICINE

## 2020-07-14 RX ORDER — POTASSIUM CHLORIDE 14.9 MG/ML
20 INJECTION INTRAVENOUS
Status: COMPLETED | OUTPATIENT
Start: 2020-07-14 | End: 2020-07-14

## 2020-07-14 RX ADMIN — POTASSIUM CHLORIDE 40 MEQ: 20 SOLUTION ORAL at 21:33

## 2020-07-14 RX ADMIN — SODIUM CHLORIDE, SODIUM GLUCONATE, SODIUM ACETATE, POTASSIUM CHLORIDE, MAGNESIUM CHLORIDE, SODIUM PHOSPHATE, DIBASIC, AND POTASSIUM PHOSPHATE 100 ML/HR: .53; .5; .37; .037; .03; .012; .00082 INJECTION, SOLUTION INTRAVENOUS at 11:31

## 2020-07-14 RX ADMIN — TOPIRAMATE 100 MG: 100 TABLET, FILM COATED ORAL at 08:14

## 2020-07-14 RX ADMIN — SODIUM CHLORIDE, SODIUM GLUCONATE, SODIUM ACETATE, POTASSIUM CHLORIDE, MAGNESIUM CHLORIDE, SODIUM PHOSPHATE, DIBASIC, AND POTASSIUM PHOSPHATE 100 ML/HR: .53; .5; .37; .037; .03; .012; .00082 INJECTION, SOLUTION INTRAVENOUS at 01:33

## 2020-07-14 RX ADMIN — AMILORIDE HYDROCLORIDE 5 MG: 5 TABLET ORAL at 08:16

## 2020-07-14 RX ADMIN — POTASSIUM CHLORIDE 40 MEQ: 20 SOLUTION ORAL at 11:29

## 2020-07-14 RX ADMIN — POTASSIUM CHLORIDE 20 MEQ: 14.9 INJECTION, SOLUTION INTRAVENOUS at 18:04

## 2020-07-14 RX ADMIN — POTASSIUM CHLORIDE 20 MEQ: 14.9 INJECTION, SOLUTION INTRAVENOUS at 01:31

## 2020-07-14 RX ADMIN — SUCRALFATE 1 G: 1 TABLET ORAL at 06:20

## 2020-07-14 RX ADMIN — AMITRIPTYLINE HYDROCHLORIDE 50 MG: 50 TABLET, FILM COATED ORAL at 21:35

## 2020-07-14 RX ADMIN — POTASSIUM CHLORIDE 20 MEQ: 14.9 INJECTION, SOLUTION INTRAVENOUS at 04:04

## 2020-07-14 RX ADMIN — POTASSIUM CHLORIDE 40 MEQ: 20 SOLUTION ORAL at 06:20

## 2020-07-14 RX ADMIN — TOPIRAMATE 100 MG: 100 TABLET, FILM COATED ORAL at 18:04

## 2020-07-14 RX ADMIN — POTASSIUM CHLORIDE 40 MEQ: 20 SOLUTION ORAL at 18:04

## 2020-07-14 RX ADMIN — PANTOPRAZOLE SODIUM 40 MG: 40 TABLET, DELAYED RELEASE ORAL at 06:20

## 2020-07-14 RX ADMIN — POTASSIUM CHLORIDE 20 MEQ: 14.9 INJECTION, SOLUTION INTRAVENOUS at 15:45

## 2020-07-14 RX ADMIN — SUCRALFATE 1 G: 1 TABLET ORAL at 15:08

## 2020-07-14 RX ADMIN — POTASSIUM CHLORIDE 40 MEQ: 20 SOLUTION ORAL at 15:08

## 2020-07-14 NOTE — PROGRESS NOTES
Progress Note - Nephrology   Randi Aleman 38 y o  female MRN: [id number]  Unit/Bed#: 310-39 Encounter: [id number]    A/P:  1  Hypokalemia   Potassium level improved to 4 millimoles/L this morning, continue monitor potassium levels  Next level should be repeated early this afternoon and if appropriate patient get potentially be discharged home  From the home management standpoint, I increased amount of potassium able to be given in a day from 60 up to 80 mEq, in addition, the patient is permitted to have up to 4 infusions weekly  2  Acidosis   Will check urinalysis to ensure pH of urine is low  3  Vitamin B12 deficiency   Continue once a month cyanocobalamin injections  4  Status post gastric bypass    Disposition:  If patient's serum potassium remains stable by this afternoon, she is okay to be discharged home from the nephrology standpoint      Follow up reason for today's visit:  Electrolyte disorder/acid-base disturbance    <principal problem not specified>    Patient Active Problem List   Diagnosis    Hypokalemia    History of gastric bypass    Migraine without aura and without status migrainosus, not intractable    Left-sided weakness    Hypophosphatemia    Anemia    Vitamin D deficiency    Weakness of both lower extremities    Weakness of both upper extremities    Elevated CPK    Vitamin B12 deficiency    Cervical lymphadenopathy    Fatigue    Paresthesia of both lower extremities    Paresthesias    B12 deficiency    Gastric bypass status for obesity    GERD (gastroesophageal reflux disease)    Migraine    WAGNER (obstructive sleep apnea)    Other intestinal malabsorption    Acute kidney injury ([address] )    Right ovarian cyst    Chest pain    Nausea & vomiting    Vertigo    Stress fracture of right foot with routine healing    Normal anion gap metabolic acidosis    Middle ear effusion         Subjective:   No Acute events overnight, patient feeling significantly better over last 24 hours    Objective:     Vitals: Blood pressure 108/62, pulse 78, temperature 98 2 °F (36 8 °C), resp  rate 16, height 5' 2" (1 575 m), weight 65 5 kg (144 lb 6 4 oz), SpO2 100 %  ,Body mass index is 26 41 kg/m²  Weight (last 2 days)     Date/Time   Weight    07/13/20 15:34:22   65 5 (144 4)                Intake/Output Summary (Last 24 hours) at 7/14/2020 0822  Last data filed at 7/14/2020 0133  Gross per 24 hour   Intake 946 67 ml   Output --   Net 946 67 ml     I/O last 3 completed shifts: In: 946 7 [I V :946 7]  Out: -          Physical Exam: /62   Pulse 78   Temp 98 2 °F (36 8 °C)   Resp 16   Ht 5' 2" (1 575 m)   Wt 65 5 kg (144 lb 6 4 oz)   SpO2 100%   BMI 26 41 kg/m²     General Appearance:    Alert, cooperative, no distress, appears stated age   Head:    Normocephalic, without obvious abnormality, atraumatic   Eyes:    Conjunctiva/corneas clear   Ears:    Normal external ears   Nose:   Nares normal, septum midline, mucosa normal, no drainage    or sinus tenderness   Throat:   Lips, mucosa, and tongue normal; teeth and gums normal   Neck:   Supple   Back:     Symmetric, no curvature, ROM normal, no CVA tenderness   Lungs:     Clear to auscultation bilaterally, respirations unlabored   Chest wall:    No tenderness or deformity   Heart:    Regular rate and rhythm, S1 and S2 normal, no murmur, rub   or gallop   Abdomen:     Soft, non-tender, bowel sounds active   Extremities:   Extremities normal, atraumatic, no cyanosis or edema   Skin:   Skin color, texture, turgor normal, no rashes or lesions   Lymph nodes:   Cervical normal   Neurologic:   CNII-XII intact            Lab, Imaging and other studies: I have personally reviewed pertinent labs    CBC:   Lab Results   Component Value Date    WBC 5 58 07/14/2020    HGB 10 5 (L) 07/14/2020    HCT 33 3 (L) 07/14/2020    MCV 85 07/14/2020     07/14/2020    MCH 26 8 07/14/2020    MCHC 31 5 07/14/2020    RDW 15 0 07/14/2020    MPV 11 1 07/14/2020 NRBC 0 07/14/2020     CMP:   Lab Results   Component Value Date    K 4 0 07/14/2020     07/14/2020    CO2 17 (L) 07/14/2020    BUN 10 07/14/2020    CREATININE 0 91 07/14/2020    CALCIUM 7 9 (L) 07/14/2020    EGFR 81 07/14/2020         Results from last 7 days   Lab Units 07/14/20  0620 07/13/20  2334 07/13/20  1545   POTASSIUM mmol/L 4 0 3 4* 2 4*   CHLORIDE mmol/L 108 105 103   CO2 mmol/L 17* 20* 21   BUN mg/dL 10 12 13   CREATININE mg/dL 0 91 0 95 1 00   CALCIUM mg/dL 7 9* 7 9* 8 5         Phosphorus:   Lab Results   Component Value Date    PHOS 3 3 07/14/2020     Magnesium:   Lab Results   Component Value Date    MG 2 4 07/14/2020     Urinalysis: No results found for: Ferd Hurry, SPECGRAV, PHUR, LEUKOCYTESUR, NITRITE, PROTEINUA, GLUCOSEU, KETONESU, BILIRUBINUR, BLOODU  Ionized Calcium: No results found for: CAION  Coagulation:   Lab Results   Component Value Date    INR 0 99 07/13/2020     Troponin: No results found for: TROPONINI  ABG: No results found for: PHART, COM3JFP, PO2ART, WOH0IVB, U7BKFTKN, BEART, SOURCE  Radiology review:     IMAGING  No results found      Current Facility-Administered Medications   Medication Dose Route Frequency    AMILoride tablet 5 mg  5 mg Oral Daily    amitriptyline (ELAVIL) tablet 50 mg  50 mg Oral HS    multi-electrolyte (PLASMALYTE-A/ISOLYTE-S PH 7 4) IV solution  100 mL/hr Intravenous Continuous    pantoprazole (PROTONIX) EC tablet 40 mg  40 mg Oral Early Morning    potassium chloride 10 % oral solution 40 mEq  40 mEq Oral 5x Daily    sucralfate (CARAFATE) tablet 1 g  1 g Oral BID AC    topiramate (TOPAMAX) tablet 100 mg  100 mg Oral BID     Medications Discontinued During This Encounter   Medication Reason    sodium chloride 0 9 % with KCl 20 mEq/L infusion (premix)     potassium chloride 20 mEq IVPB (premix)     al mag oxide-diphenhydramine-lidocaine viscous (MAGIC MOUTHWASH) 1:1:1 suspension     betamethasone dipropionate (DIPROSONE) 0 05 % cream  doxepin (SINEquan) 25 mg capsule     methylprednisolone (MEDROL) 4 mg tablet     potassium chloride 40 mEq/100 mL     pantoprazole (PROTONIX) EC tablet 40 mg Duplicate order       Wolf Melvin, DO      This progress note was produced in part using a dictation device which may document imprecise wording from author's original intent

## 2020-07-14 NOTE — PROGRESS NOTES
Progress Note - Ean Hernandez 1985, 38 y o  female MRN: [id number]    Unit/Bed#: 313-50 Encounter: [id number]    Primary Care Provider: Emy Oliver DO   Date and time admitted to hospital: 2020 12:49 PM        * Hypokalemia  Assessment & Plan  · Continue potassium chloride supplementation per Nephrology  · Follow the patient's potassium level    Normal anion gap metabolic acidosis  Assessment & Plan  · Continue Isolyte IV fluids at 100 ml/hr per Nephrology  · Follow the sodium bicarbonate level and anion gap      Vitamin D deficiency  Assessment & Plan  · Continue vitamin D supplementation      VTE Pharmacologic Prophylaxis:   Pharmacologic: Enoxaparin (Lovenox)  Mechanical VTE Prophylaxis in Place: Yes    Patient Centered Rounds: I have performed bedside rounds with nursing staff today  Time Spent for Care: 30 minutes  More than 50% of total time spent on counseling and coordination of care as described above  Current Length of Stay: 1 day(s)    Current Patient Status: Inpatient   Certification Statement: The patient was initially admitted under observation status  The patient continues to require continuous IV fluids and potassium chloride supplementation in addition to serial laboratory testing to monitor her potassium level  The patient now requires at least a 2-midnight hospitalization and will be made INPATIENT admission status  Code Status: Level 1 - Full Code      Subjective: The patient was seen and examined  The patient is doing better  No extremity weakness or paresthesias  No chest pain  No shortness of breath  No abdominal pain  No nausea or vomiting  Objective:     Vitals:   Temp (24hrs), Av 1 °F (36 7 °C), Min:98 °F (36 7 °C), Max:98 2 °F (36 8 °C)    Temp:  [98 °F (36 7 °C)-98 2 °F (36 8 °C)] 98 2 °F (36 8 °C)  HR:  [78-82] 78  Resp:  [16-18] 16  BP: (108-110)/(61-62) 108/62  SpO2:  [99 %-100 %] 100 %  Body mass index is 26 41 kg/m²       Input and Output Summary (last 24 hours): Intake/Output Summary (Last 24 hours) at 7/14/2020 1108  Last data filed at 7/14/2020 0918  Gross per 24 hour   Intake 946 67 ml   Output 200 ml   Net 746 67 ml       Physical Exam:     Physical Exam  General:  NAD, follows commands  HEENT:  NC/AT, mucous membranes moist  Neck:  Supple, No JVP elevation  CV:  + S1, + S2, RRR  Pulm:  Lung fields are CTA bilaterally  Abd:  Soft, Non-tender, Non-distended  Ext:  No clubbing/cyanosis/edema  Skin:  No rashes  Neuro:  Awake, alert, oriented  Psych:  Normal mood and affect      Additional Data:    Labs:    Results from last 7 days   Lab Units 07/14/20  0620   WBC Thousand/uL 5 58   HEMOGLOBIN g/dL 10 5*   HEMATOCRIT % 33 3*   PLATELETS Thousands/uL 263   NEUTROS PCT % 49   LYMPHS PCT % 38   MONOS PCT % 9   EOS PCT % 3     Results from last 7 days   Lab Units 07/14/20  0620   SODIUM mmol/L 137   POTASSIUM mmol/L 4 0   CHLORIDE mmol/L 108   CO2 mmol/L 17*   BUN mg/dL 10   CREATININE mg/dL 0 91   ANION GAP mmol/L 12   CALCIUM mg/dL 7 9*   GLUCOSE RANDOM mg/dL 80     Results from last 7 days   Lab Units 07/13/20  2334   INR  0 99                       * I Have Reviewed All Lab Data Listed Above  * Additional Pertinent Lab Tests Reviewed:  All [facility] Admission Reviewed      Recent Cultures (last 7 days):           Last 24 Hours Medication List:     Current Facility-Administered Medications:  AMILoride 5 mg Oral Daily Carolee Reas, CRNP    amitriptyline 50 mg Oral HS Edd Nolasco DO    enoxaparin 40 mg Subcutaneous Q24H Uriah Pace DO    multi-electrolyte 100 mL/hr Intravenous Continuous Carolee Reas, CRNP Last Rate: 100 mL/hr (07/14/20 0133)   pantoprazole 40 mg Oral Early Morning Carolee Reas, CRNP    potassium chloride 40 mEq Oral 5x Daily Carolee Reas, CRNP    sucralfate 1 g Oral BID AC Carolee Reas, CRNP    topiramate 100 mg Oral BID Edd Nolasco DO         Today, Patient Was Seen By: Uriah Pace DO    ** Please Note: Dictation voice to text software may have been used in the creation of this document   **

## 2020-07-14 NOTE — UTILIZATION REVIEW
Notification of Inpatient Admission/Inpatient Authorization Request   This is a Notification of Inpatient Admission for [address]  Be advised that this patient was admitted to our facility under Inpatient Status  Contact Kulwant Willis at [phone] for additional admission information  [facility] DEPT  DEDICATED [phone]  Patient Name:   Peg Kimbrough   YOB: 1985       [address]   [address]:   [address]  Tax ID: [id number]  NPI: [id number] Attending Provider/NPI:  Phone:  Address: Alissa Smith [[id number]]  [phone]  Same as Facility   Place of Service Code: 24 Place of Service Name:  [address] [place] [address]   Start Date: 7/13/20 1249 Discharge Date & Time: No discharge date for patient encounter  Type of Admission: Inpatient Status Discharge Disposition   (if discharged): Home/Self Care   Patient Diagnoses: Hypokalemia [E87 6]     Orders: Admission Orders (From admission, onward)     Ordered        07/14/20 1046  Inpatient Admission  Once         07/13/20 1356  Place in Observation  Once                    Assigned Utilization Review Contact: Kulwant Willis  Utilization   Network Utilization Review Department  Phone: [phone]; Fax [phone]  Email: [email or web address]   ATTENTION PAYERS: Please call the assigned Utilization  directly with any questions or concerns ALL voicemails in the department are confidential  Send all requests for admission clinical reviews, approved or denied determinations and any other requests to dedicated fax number belonging to the campus where the patient is receiving treatment

## 2020-07-14 NOTE — H&P
H&P- Tiffany Young 1985, 38 y o  female MRN: [id number]    Unit/Bed#: 547-42 Encounter: [id number]    Primary Care Provider: Jill Omalley DO   Date and time admitted to hospital: 2020 12:49 PM        Hypokalemia  Assessment & Plan  Continue with potassium supplements, follow up serial labs  Weakness of both upper extremities  Assessment & Plan  Secondary to hypokalemia  Weakness of both lower extremities  Assessment & Plan  Secondary to hypokalemia      Anticipated Length of Stay:  Patient will be admitted on an Observation basis with an anticipated length of stay of  less than 2 midnights  Justification for Hospital Stay:  Treatment of symptomatic hypokalemia    Total Time for Visit, including Counseling / Coordination of Care: 20 minutes  Greater than 50% of this total time spent on direct patient counseling and coordination of care  Chief Complaint:   Symptomatic hypokalemia    History of Present Illness:    Tiffany Young is a 38 y o  female who presents with symptomatic hypokalemia  Patient is well known to our service, frequent admissions for the same  Patient has no other acute complaints  Review of Systems:    Review of Systems   All other systems reviewed and are negative  Past Medical and Surgical History:     Past Medical History:   Diagnosis Date    H  pylori infection     Hypokalemia     Migraine     Renal disorder     Rhabdomyolysis        Past Surgical History:   Procedure Laterality Date    ABDOMINAL SURGERY      APPENDECTOMY       SECTION      CHOLECYSTECTOMY      COSMETIC SURGERY      FL GUIDED CENTRAL VENOUS ACCESS DEVICE INSERTION  2018    GASTRIC BYPASS      HYSTERECTOMY      TUNNELED VENOUS PORT PLACEMENT N/A 2018    Procedure: INSERTION VENOUS PORT (PORT-A-CATH);   Surgeon: Rosamaria Garcia DO;  Location: [facility];  Service: General       Meds/Allergies:    Prior to Admission medications    Medication Sig Start Date End Date Taking?  Authorizing Provider   AMILoride 5 mg tablet Take 1 tablet (5 mg total) by mouth daily 2/20/19  Yes Jenna Hewitt DO   amitriptyline (ELAVIL) 50 mg tablet Take 50 mg by mouth daily at bedtime 8/27/18 7/13/20 Yes Historical Provider, MD   b complex-C-folic acid (NEPHRO-EDIE) 0 8 mg tablet Take 0 8 mg by mouth daily with dinner   Yes Historical Provider, MD   cyanocobalamin 1,000 mcg/mL Inject 1 mL (1,000 mcg total) into a muscle every 30 (thirty) days 5/4/20  Yes Vidal Forde DO   ergocalciferol (ERGOCALCIFEROL) 1 25 MG (24187 UT) capsule Take 1 capsule (50,000 Units total) by mouth 2 (two) times a week Patient takes this med on Mondays  Patient taking differently: Take 50,000 Units by mouth once a week Mondays and Thursdays 3/26/20  Yes Uriel Houser MD   ferrous sulfate 325 (65 Fe) mg tablet Take 325 mg by mouth every other day Last took 3/9/20   Yes Historical Provider, MD   other medication, see sig, *Potassium infusions 2x week   Yes Historical Provider, MD   pantoprazole (PROTONIX) 40 mg tablet Take 1 tablet (40 mg total) by mouth daily 5/18/20  Yes Vidal Forde DO   potassium chloride 0 4 mEq/mL Infuse 200 mL (80 mEq total) into a venous catheter 3 (three) times a week 7/13/20  Yes Juan Carlos Ricketts DO   potassium chloride 10 % oral solution Take 30 mL (40 mEq total) by mouth 5 (five) times a day 7/13/20 8/12/20 Yes Juan Carlos Ricketts DO   sucralfate (CARAFATE) 1 g tablet Take 1 tablet (1 g total) by mouth daily  Patient taking differently: Take 1 g by mouth 2 (two) times a day before meals  5/18/20  Yes Vidal Forde DO   thiamine 100 MG tablet Take 100 mg by mouth daily   Yes Historical Provider, MD   topiramate (TOPAMAX) 100 mg tablet Take 100 mg by mouth 2 (two) times a day   Yes Historical Provider, MD   zinc sulfate (ZINCATE) 220 mg capsule Take 1 capsule (220 mg total) by mouth daily 8/9/19  Yes Alecia Byrne MD   Catheters MISC by Does not apply route 2 (two) times a week Port care per [facility] protocol 5/7/20   David Uriostegui DO   meclizine (ANTIVERT) 25 mg tablet Take 25 mg by mouth daily at bedtime 8/27/18   Historical Provider, MD   ondansetron (ZOFRAN-ODT) 4 mg disintegrating tablet Take 4 mg by mouth every 6 (six) hours as needed for nausea or vomiting    Historical Provider, MD   SUMAtriptan (IMITREX) 100 mg tablet Take 100 mg by mouth once as needed for migraine     Historical Provider, MD   Syringe/Needle, Disp, (SYRINGE 3CC/25GX5/8") 25G X 5/8" 3 ML MISC Use once monthly for B12 injection  6/1/20   David Uriostegui DO   al mag oxide-diphenhydramine-lidocaine viscous (MAGIC MOUTHWASH) 1:1:1 suspension Swish and swallow 10 mL every 4 (four) hours as needed for mouth pain or discomfort 5/2/20 7/13/20  Brar Liner, MD   betamethasone dipropionate (DIPROSONE) 0 05 % cream Apply topically 2 (two) times a day  7/13/20  Historical Provider, MD   doxepin (SINEquan) 25 mg capsule Take 25 mg by mouth daily at bedtime  7/13/20  Historical Provider, MD   methylprednisolone (MEDROL) 4 mg tablet Take 4 mg by mouth 2 (two) times a day  7/13/20  Historical Provider, MD   potassium chloride 0 4 mEq/mL Infuse 150 mL (60 mEq total) into a venous catheter 3 (three) times a week 6/22/20 7/13/20  David Uriostegui DO   potassium chloride 10 % oral solution Take 30 mL (40 mEq total) by mouth 3 (three) times a day  Patient taking differently: Take 40 mEq by mouth 5 (five) times a day  4/23/20 7/13/20  David Uriostegui DO   potassium chloride 40 mEq/100 mL Infuse 100 mL (40 mEq total) into a venous catheter once for 1 dose 6/3/20 7/13/20  David Uriostegui DO     I have reviewed home medications with patient personally  Allergies: Allergies   Allergen Reactions    Nsaids Other (See Comments)     Other reaction(s): Other (Please comment)  Should not take s/p bariatric surgery  Other reaction(s):  Other (See Comments)  Should not take as per prior records  Should not take s/p bariatric surgery  Has hx of gastric bypass      Prednisone      Nausea, vomiting, diarrhea       Social History:     Marital Status: /Civil Union   Substance Use History:   Social History     Substance and Sexual Activity   Alcohol Use Never    Alcohol/week: 0 0 standard drinks    Frequency: Never    Drinks per session: Patient refused    Binge frequency: Never    Comment: 0     Social History     Tobacco Use   Smoking Status Never Smoker   Smokeless Tobacco Never Used     Social History     Substance and Sexual Activity   Drug Use Never       Family History:    non-contributory    Physical Exam:     Vitals:   Blood Pressure: 110/61 (07/13/20 1534)  Pulse: 82 (07/13/20 1534)  Temperature: 98 °F (36 7 °C) (07/13/20 1534)  Temp Source: Oral (07/13/20 1534)  Respirations: 18 (07/13/20 1534)  Height: 5' 2" (157 5 cm) (07/13/20 1534)  Weight - Scale: 65 5 kg (144 lb 6 4 oz) (07/13/20 1534)  SpO2: 99 % (07/13/20 1534)    Physical Exam   Constitutional: She is oriented to person, place, and time  She appears well-developed and well-nourished  No distress  HENT:   Head: Normocephalic and atraumatic  Right Ear: External ear normal    Left Ear: External ear normal    Pulmonary/Chest: Effort normal    Musculoskeletal: Normal range of motion  Neurological: She is alert and oriented to person, place, and time  No cranial nerve deficit  Coordination normal    Skin: She is not diaphoretic  Psychiatric: She has a normal mood and affect  Her behavior is normal  Judgment and thought content normal    Nursing note and vitals reviewed  Additional Data:     Lab Results: I have personally reviewed pertinent reports            Results from last 7 days   Lab Units 07/13/20  1545   SODIUM mmol/L 136   POTASSIUM mmol/L 2 4*   CHLORIDE mmol/L 103   CO2 mmol/L 21   BUN mg/dL 13   CREATININE mg/dL 1 00   ANION GAP mmol/L 12   CALCIUM mg/dL 8 5   GLUCOSE RANDOM mg/dL 81 Imaging: I have personally reviewed pertinent reports  No orders to display       Allscripts / Epic Records Reviewed:  Yes

## 2020-07-14 NOTE — ASSESSMENT & PLAN NOTE
· Continue Isolyte IV fluids at 100 ml/hr per Nephrology  · Follow the sodium bicarbonate level and anion gap

## 2020-07-14 NOTE — ASSESSMENT & PLAN NOTE
· Continue potassium chloride supplementation per Nephrology  · Follow the patient's potassium level

## 2020-07-14 NOTE — PROGRESS NOTES
Met with patient to offer diet education on high potassium foods due to chronic hypokalemia  Patient states she does try to eat potassium rich foods daily and typically obtains from local farms  She denies any weight changes  Appetite appropriate  Encouraged patient to contact RD with questions

## 2020-07-14 NOTE — UTILIZATION REVIEW
Initial Clinical Review  WAS OBSERVATION 7/13/20 @1356 CONVERTED TO INPATIENT ADMISSION 7/14/20 @1046 DUE TO CONTINUED STAY REQUIRED TO CARE FOR PATIENT WITH RECURRENT HYPOKALEMIA  SERIAL K MONITORING IS IN PROGRESS WITH MULTIPLE DOSES IV AND PO KCL  RENAL IS FOLLOWING  IVF AND TELE ARE IN PROGRESS  Admission Orders (From admission, onward)     Ordered        07/14/20 1046  Inpatient Admission  Once         07/13/20 1356  Place in Observation  Once                   Orders Placed This Encounter   Procedures    Inpatient Admission     Standing Status:   Standing     Number of Occurrences:   1     Order Specific Question:   Admitting Physician     Answer:   Adelia Ortiz     Order Specific Question:   Level of Care     Answer:   Med Surg [16]     Order Specific Question:   Estimated length of stay     Answer:   More than 2 Midnights     Order Specific Question:   Certification     Answer:   I certify that inpatient services are medically necessary for this patient for a duration of greater than two midnights  See H&P and MD Progress Notes for additional information about the patient's course of treatment  Assessment/Plan: 39 yo female w/hx migraines, gastric bypass,  frequent admits for hypokalemia directly admitted under observation due to recurrent hypokalemia with a gap metabolic acidosis  Presented with sensation of body vibrating and weakness  K 2 7  She receives tri weekly kcl infusions, which increased to 4x weekly last week due to declining levels  The cause of her recurrent hypokalemia is unknown and her case is being investigated by [facility]  Renal consulted, IVF, IV and PO repletion in progress with tele  Starting k sparing diuretic, PPI, and carafate in progress  7/14: CONVERTED TO INPATIENT ADMISSION; k improved today, however requires serial lab testing to ensure that it does not drop again   Needs to increase daily k supplements and continue up to 4 k infusions weekly  KCL supplements will continue with IVF  Following k, na, bicarb levels, and a gap  Vit d supplementation in progress for deficiency       Temperature Pulse Respirations Blood Pressure SpO2   07/13/20 1534 07/13/20 1534 07/13/20 1534 07/13/20 1534 07/13/20 1534   98 °F (36 7 °C) 82 18 110/61 99 %      Temp Source Heart Rate Source Patient Position - Orthostatic VS BP Location FiO2 (%)   07/13/20 1534 -- 07/13/20 1534 07/13/20 1534 --   Oral  Sitting Left arm       Pain Score       07/14/20 0004       No Pain        Wt Readings from Last 1 Encounters:   07/13/20 65 5 kg (144 lb 6 4 oz)     Additional Vital Signs:   Date/Time  Temp  Pulse  Resp  BP  MAP (mmHg)  SpO2  O2 Device  Patient Position - Orthostatic VS   07/14/20 07:02:49  98 2 °F (36 8 °C)  78  16  108/62  77  100 %  --         Pertinent Labs/Diagnostic Test Results:     no rads or EKG     Ref Range & Units     7/14/20 0620   Iron Saturation % 9    TIBC 250 - 450 ug/dL 325    Iron 50 - 170 ug/dL 30Low           Results from last 7 days   Lab Units 07/14/20  0620   WBC Thousand/uL 5 58   HEMOGLOBIN g/dL 10 5*   HEMATOCRIT % 33 3*   PLATELETS Thousands/uL 263   NEUTROS ABS Thousands/µL 2 75         Results from last 7 days   Lab Units 07/14/20  0620 07/13/20  2334 07/13/20  1545 07/13/20  0806 07/10/20  1146   SODIUM mmol/L 137 136 136 136 133*   POTASSIUM mmol/L 4 0 3 4* 2 4* 2 7* 2 8*   CHLORIDE mmol/L 108 105 103 101 101   CO2 mmol/L 17* 20* 21 19* 23   ANION GAP mmol/L 12 11 12 16* 9   BUN mg/dL 10 12 13 13 11   CREATININE mg/dL 0 91 0 95 1 00 1 22 1 14   EGFR ml/min/1 73sq m 81 77 73 57 62   CALCIUM mg/dL 7 9* 7 9* 8 5 8 7 8 4   MAGNESIUM mg/dL 2 4  --  2 4  --   --    PHOSPHORUS mg/dL 3 3  --   --   --   --      Results from last 7 days   Lab Units 07/14/20  0620 07/13/20  2334 07/13/20  1545 07/13/20  0806 07/10/20  1146   GLUCOSE RANDOM mg/dL 80 87 81 82 88     Results from last 7 days   Lab Units 07/13/20  2334 PROTIME seconds 13 1   INR  0 99       Past Medical History:   Diagnosis Date    H  pylori infection     Hypokalemia     Migraine     Renal disorder     Rhabdomyolysis      Present on Admission:   Hypokalemia   Weakness of both lower extremities   Weakness of both upper extremities      Admitting Diagnosis: Hypokalemia [E87 6]  Age/Sex: 38 y o  female  Admission Orders:  Scheduled Medications:    Medications:  AMILoride 5 mg Oral Daily   amitriptyline 50 mg Oral HS   pantoprazole 40 mg Oral Early Morning   potassium chloride 40 mEq Oral 5x Daily   sucralfate 1 g Oral BID AC   topiramate 100 mg Oral BID   IV kcl x 5 on 7/13, x  2 on 7/14    Continuous IV Infusions:  multi-electrolyte 100 mL/hr Intravenous Continuous        House diet  oob as mitra  Check iron sat, ferritin, iron panel    IP CONSULT TO NEPHROLOGY    Network Utilization Review Department  [email or web address]  ATTENTION: Please call with any questions or concerns to [phone] and carefully listen to the prompts so that you are directed to the right person  All voicemails are confidential   [facility] all requests for admission clinical reviews, approved or denied determinations and any other requests to dedicated fax number below belonging to the campus where the patient is receiving treatment   List of dedicated fax numbers for the Facilities:  FACILITY NAME UR FAX NUMBER   ADMISSION DENIALS (Administrative/Medical Necessity) [phone]   [address] (Maternity/NICU/Pediatrics) [phone]   Lemuel Mayer [phone]   Olinda Salazar [phone]   Sheri Mosqueda [phone]   Jermain Farias Lui [address] [facility] [place] [id number] [address] [facility] [address] [phone]

## 2020-07-15 ENCOUNTER — TELEPHONE (OUTPATIENT)
Dept: NEPHROLOGY | Facility: CLINIC | Age: 35
End: 2020-07-15

## 2020-07-15 VITALS
BODY MASS INDEX: 26.57 KG/M2 | RESPIRATION RATE: 17 BRPM | DIASTOLIC BLOOD PRESSURE: 56 MMHG | HEIGHT: 62 IN | HEART RATE: 85 BPM | TEMPERATURE: 98.2 F | SYSTOLIC BLOOD PRESSURE: 103 MMHG | WEIGHT: 144.4 LBS | OXYGEN SATURATION: 100 %

## 2020-07-15 LAB
ALBUMIN SERPL BCP-MCNC: 2.8 G/DL (ref 3.5–5)
ALP SERPL-CCNC: 34 U/L (ref 46–116)
ALT SERPL W P-5'-P-CCNC: 19 U/L (ref 12–78)
ANION GAP SERPL CALCULATED.3IONS-SCNC: 10 MMOL/L (ref 4–13)
AST SERPL W P-5'-P-CCNC: 15 U/L (ref 5–45)
BACTERIA UR CULT: NORMAL
BASOPHILS # BLD AUTO: 0.07 THOUSANDS/ΜL (ref 0–0.1)
BASOPHILS NFR BLD AUTO: 1 % (ref 0–1)
BILIRUB SERPL-MCNC: 0.2 MG/DL (ref 0.2–1)
BUN SERPL-MCNC: 8 MG/DL (ref 5–25)
CA-I BLD-SCNC: 1.18 MMOL/L (ref 1.12–1.32)
CALCIUM SERPL-MCNC: 7.7 MG/DL (ref 8.3–10.1)
CHLORIDE SERPL-SCNC: 111 MMOL/L (ref 100–108)
CK SERPL-CCNC: 47 U/L (ref 26–192)
CO2 SERPL-SCNC: 18 MMOL/L (ref 21–32)
CREAT SERPL-MCNC: 0.83 MG/DL (ref 0.6–1.3)
EOSINOPHIL # BLD AUTO: 0.14 THOUSAND/ΜL (ref 0–0.61)
EOSINOPHIL NFR BLD AUTO: 3 % (ref 0–6)
ERYTHROCYTE [DISTWIDTH] IN BLOOD BY AUTOMATED COUNT: 15.2 % (ref 11.6–15.1)
GFR SERPL CREATININE-BSD FRML MDRD: 91 ML/MIN/1.73SQ M
GLUCOSE SERPL-MCNC: 85 MG/DL (ref 65–140)
HCT VFR BLD AUTO: 31.1 % (ref 34.8–46.1)
HGB BLD-MCNC: 9.7 G/DL (ref 11.5–15.4)
IMM GRANULOCYTES # BLD AUTO: 0.01 THOUSAND/UL (ref 0–0.2)
IMM GRANULOCYTES NFR BLD AUTO: 0 % (ref 0–2)
LYMPHOCYTES # BLD AUTO: 2.06 THOUSANDS/ΜL (ref 0.6–4.47)
LYMPHOCYTES NFR BLD AUTO: 42 % (ref 14–44)
MAGNESIUM SERPL-MCNC: 2.2 MG/DL (ref 1.6–2.6)
MCH RBC QN AUTO: 26.8 PG (ref 26.8–34.3)
MCHC RBC AUTO-ENTMCNC: 31.2 G/DL (ref 31.4–37.4)
MCV RBC AUTO: 86 FL (ref 82–98)
MONOCYTES # BLD AUTO: 0.39 THOUSAND/ΜL (ref 0.17–1.22)
MONOCYTES NFR BLD AUTO: 8 % (ref 4–12)
NEUTROPHILS # BLD AUTO: 2.27 THOUSANDS/ΜL (ref 1.85–7.62)
NEUTS SEG NFR BLD AUTO: 46 % (ref 43–75)
NRBC BLD AUTO-RTO: 0 /100 WBCS
PHOSPHATE SERPL-MCNC: 3.1 MG/DL (ref 2.7–4.5)
PLATELET # BLD AUTO: 244 THOUSANDS/UL (ref 149–390)
PMV BLD AUTO: 10.5 FL (ref 8.9–12.7)
POTASSIUM SERPL-SCNC: 3.7 MMOL/L (ref 3.5–5.3)
PROCALCITONIN SERPL-MCNC: <0.05 NG/ML
PROT SERPL-MCNC: 5.8 G/DL (ref 6.4–8.2)
RBC # BLD AUTO: 3.62 MILLION/UL (ref 3.81–5.12)
SARS-COV-2 IGG+IGM SERPL QL IA: NORMAL
SODIUM SERPL-SCNC: 139 MMOL/L (ref 136–145)
TROPONIN I SERPL-MCNC: <0.02 NG/ML
WBC # BLD AUTO: 4.94 THOUSAND/UL (ref 4.31–10.16)

## 2020-07-15 PROCEDURE — 85025 COMPLETE CBC W/AUTO DIFF WBC: CPT | Performed by: INTERNAL MEDICINE

## 2020-07-15 PROCEDURE — 84484 ASSAY OF TROPONIN QUANT: CPT | Performed by: INTERNAL MEDICINE

## 2020-07-15 PROCEDURE — 86769 SARS-COV-2 COVID-19 ANTIBODY: CPT | Performed by: INTERNAL MEDICINE

## 2020-07-15 PROCEDURE — 80053 COMPREHEN METABOLIC PANEL: CPT | Performed by: INTERNAL MEDICINE

## 2020-07-15 PROCEDURE — 99232 SBSQ HOSP IP/OBS MODERATE 35: CPT | Performed by: NURSE PRACTITIONER

## 2020-07-15 PROCEDURE — 84100 ASSAY OF PHOSPHORUS: CPT | Performed by: INTERNAL MEDICINE

## 2020-07-15 PROCEDURE — 82330 ASSAY OF CALCIUM: CPT | Performed by: INTERNAL MEDICINE

## 2020-07-15 PROCEDURE — 82550 ASSAY OF CK (CPK): CPT | Performed by: INTERNAL MEDICINE

## 2020-07-15 PROCEDURE — 84145 PROCALCITONIN (PCT): CPT | Performed by: INTERNAL MEDICINE

## 2020-07-15 PROCEDURE — 99239 HOSP IP/OBS DSCHRG MGMT >30: CPT | Performed by: INTERNAL MEDICINE

## 2020-07-15 PROCEDURE — 83735 ASSAY OF MAGNESIUM: CPT | Performed by: INTERNAL MEDICINE

## 2020-07-15 RX ORDER — POTASSIUM CHLORIDE 14.9 MG/ML
20 INJECTION INTRAVENOUS ONCE
Status: COMPLETED | OUTPATIENT
Start: 2020-07-15 | End: 2020-07-15

## 2020-07-15 RX ADMIN — POTASSIUM CHLORIDE 20 MEQ: 14.9 INJECTION, SOLUTION INTRAVENOUS at 09:33

## 2020-07-15 RX ADMIN — PANTOPRAZOLE SODIUM 40 MG: 40 TABLET, DELAYED RELEASE ORAL at 06:03

## 2020-07-15 RX ADMIN — TOPIRAMATE 100 MG: 100 TABLET, FILM COATED ORAL at 09:32

## 2020-07-15 RX ADMIN — SUCRALFATE 1 G: 1 TABLET ORAL at 06:03

## 2020-07-15 RX ADMIN — AMILORIDE HYDROCLORIDE 5 MG: 5 TABLET ORAL at 09:32

## 2020-07-15 RX ADMIN — SODIUM CHLORIDE, SODIUM GLUCONATE, SODIUM ACETATE, POTASSIUM CHLORIDE, MAGNESIUM CHLORIDE, SODIUM PHOSPHATE, DIBASIC, AND POTASSIUM PHOSPHATE 100 ML/HR: .53; .5; .37; .037; .03; .012; .00082 INJECTION, SOLUTION INTRAVENOUS at 00:44

## 2020-07-15 RX ADMIN — POTASSIUM CHLORIDE 40 MEQ: 20 SOLUTION ORAL at 06:03

## 2020-07-15 RX ADMIN — IRON SUCROSE 300 MG: 20 INJECTION, SOLUTION INTRAVENOUS at 11:29

## 2020-07-15 RX ADMIN — POTASSIUM CHLORIDE 40 MEQ: 20 SOLUTION ORAL at 11:29

## 2020-07-15 NOTE — TELEPHONE ENCOUNTER
----- Message from Lillie Lira RN sent at 7/15/2020  9:09 AM EDT -----  Regarding: Follow up appointment   Pt was discharged today from [facility]   Please call the patient at home with a follow up appointment

## 2020-07-15 NOTE — SOCIAL WORK
Pt is being discharged today  I in basket messaged Dr Tonia Delacruz office to call the patient at home with a follow up appointment  AVS reviewed with patient with a good understanding  Case Management reviewed discharge planning process including the following: identifying help that is needed at home, pt's preference for discharge needs and Meds at [facility]  Reviewed with Pt that any member of the healthcare team can answer questions regarding : medications, jmportance of recognizing  Signs and symptoms of any  medical problems  Case Management also encouraged pt to follow up with all recommended appointments after discharge

## 2020-07-15 NOTE — PROGRESS NOTES
NEPHROLOGY PROGRESS NOTE   Jaswinder Michael 38 y o  female MRN: [id number]  Unit/Bed#: 132-46 Encounter: [id number]    Assessment/Plan:    37 yo female admitted  with symptomatic hypokalemia s/p aggressive replacement being discharged home  1  Hypokalemia  · Resolved with aggressive repletion  Will be discharged today and continue to receive potassium infusions  Infusions were increased to 80 mEq by Dr Andino Brought  She is going on vacation to [facility] in November I have advised her to set-up office appointment so arrangements can be made  She should have her  learn how to access port-a-cath but she is reluctant  2  Iron Deficiency Anemia  · administered 300 mg Venofer today  Have advised her to switch from ferrous sulfate to Proferrin  She is agreeable  3  Anion Gap Metabolic Acidosis-resolved        ROS  Seen and examined sitting in bed  No physical complaints  A complete 10 point review of systems have been performed and are otherwise negative  Historical Information   Past Medical History:   Diagnosis Date    H  pylori infection     Hypokalemia     Migraine     Renal disorder     Rhabdomyolysis      Past Surgical History:   Procedure Laterality Date    ABDOMINAL SURGERY      APPENDECTOMY       SECTION      CHOLECYSTECTOMY      COSMETIC SURGERY      FL GUIDED CENTRAL VENOUS ACCESS DEVICE INSERTION  2018    GASTRIC BYPASS      HYSTERECTOMY      TUNNELED VENOUS PORT PLACEMENT N/A 2018    Procedure: INSERTION VENOUS PORT (PORT-A-CATH);   Surgeon: Kandice Brunner, DO;  Location: [facility] MAIN OR;  Service: General     Social History   Social History     Substance and Sexual Activity   Alcohol Use Never    Alcohol/week: 0 0 standard drinks    Frequency: Never    Drinks per session: Patient refused    Binge frequency: Never    Comment: 0     Social History     Substance and Sexual Activity   Drug Use Never     Social History     Tobacco Use   Smoking Status Never Smoker Smokeless Tobacco Never Used       Family History:   Family History   Problem Relation Age of Onset    No Known Problems Mother     Hypertension Father     Diabetes Father     Diabetes Maternal Grandfather        Medications:  Pertinent medications were reviewed    Current Facility-Administered Medications:  AMILoride 5 mg Oral Daily Baron Lakes, CRNP    amitriptyline 50 mg Oral HS Cesar Kowalski, DO    enoxaparin 40 mg Subcutaneous Q24H Orkobe Ventura, DO    multi-electrolyte 100 mL/hr Intravenous Continuous Baron Louis, ARIADNA Last Rate: 100 mL/hr (07/15/20 0044)   pantoprazole 40 mg Oral Early Morning Baron Lakes, CRNP    potassium chloride 40 mEq Oral 5x Daily Baron Lakes, CRNP    sucralfate 1 g Oral BID AC Baron Lakes, CRNP    topiramate 100 mg Oral BID Cesar Kowalski, DO          Allergies   Allergen Reactions    Nsaids Other (See Comments)     Other reaction(s): Other (Please comment)  Should not take s/p bariatric surgery  Other reaction(s): Other (See Comments)  Should not take as per prior records  Should not take s/p bariatric surgery  Has hx of gastric bypass      Prednisone      Nausea, vomiting, diarrhea         Vitals:   /56   Pulse 85   Temp 98 2 °F (36 8 °C)   Resp 17   Ht 5' 2" (1 575 m)   Wt 65 5 kg (144 lb 6 4 oz)   SpO2 100%   BMI 26 41 kg/m²   Body mass index is 26 41 kg/m²    SpO2: 100 %,   SpO2 Activity: At Rest,   O2 Device: None (Room air)      Intake/Output Summary (Last 24 hours) at 7/15/2020 1349  Last data filed at 7/15/2020 1250  Gross per 24 hour   Intake 480 ml   Output --   Net 480 ml     Invasive Devices     Central Venous Catheter Line            Port A Cath 12/22/18 Right Chest 571 days                Physical Exam  General: conscious, cooperative, in no acute distress  Eyes: conjunctivae pink, anicteric sclerae  ENT: lips and mucous membranes moist  Neck: supple, no JVD, no masses  Chest: clear breath sounds bilateral, no crackles, ronchus or wheezings  CVS: distinct S1 & S2, normal rate, regular rhythm  Abdomen: soft, non-tender, non-distended, normoactive bowel sounds  Extremities: no edema of both legs  Skin: no rash, pale  Neuro: awake, alert, oriented      Diagnostic Data:  Lab: I have personally reviewed pertinent lab results  ,   CBC:  Results from last 7 days   Lab Units 07/15/20  0601   WBC Thousand/uL 4 94   HEMOGLOBIN g/dL 9 7*   HEMATOCRIT % 31 1*   PLATELETS Thousands/uL 244      CMP: Lab Results   Component Value Date    SODIUM 139 07/15/2020    K 3 7 07/15/2020     (H) 07/15/2020    CO2 18 (L) 07/15/2020    BUN 8 07/15/2020    CREATININE 0 83 07/15/2020    CALCIUM 7 7 (L) 07/15/2020    AST 15 07/15/2020    ALT 19 07/15/2020    ALKPHOS 34 (L) 07/15/2020    EGFR 91 07/15/2020   ,   PT/INR: No results found for: PT, INR,   Magnesium: No components found for: MAG,  Phosphorous:   Lab Results   Component Value Date    PHOS 3 1 07/15/2020       Microbiology:  @LABRCNTIP,(urinecx:7)@        [facility], [facility]    Portions of the record may have been created with voice recognition software  Occasional wrong word or "sound a like" substitutions may have occurred due to the inherent limitations of voice recognition software  Read the chart carefully and recognize, using context, where substitutions have occurred

## 2020-07-15 NOTE — ASSESSMENT & PLAN NOTE
· Treated with Isolyte IV fluids during the hospitalization  · Follow the sodium bicarbonate level and anion gap as an outpatient with Nephrology

## 2020-07-15 NOTE — PLAN OF CARE
Problem: METABOLIC, FLUID AND ELECTROLYTES - ADULT  Goal: Electrolytes maintained within normal limits  Description  INTERVENTIONS:  - Monitor labs and assess patient for signs and symptoms of electrolyte imbalances  - Administer electrolyte replacement as ordered  - Monitor response to electrolyte replacements, including repeat lab results as appropriate  - Instruct patient on fluid and nutrition as appropriate  Outcome: Progressing     Problem: PAIN - ADULT  Goal: Verbalizes/displays adequate comfort level or baseline comfort level  Description  Interventions:  - Encourage patient to monitor pain and request assistance  - Assess pain using appropriate pain scale  - Administer analgesics based on type and severity of pain and evaluate response  - Implement non-pharmacological measures as appropriate and evaluate response  - Consider cultural and social influences on pain and pain management  - Notify physician/advanced practitioner if interventions unsuccessful or patient reports new pain  Outcome: Progressing     Problem: INFECTION - ADULT  Goal: Absence or prevention of progression during hospitalization  Description  INTERVENTIONS:  - Assess and monitor for signs and symptoms of infection  - Monitor lab/diagnostic results  - Monitor all insertion sites, i e  indwelling lines, tubes, and drains  - [place] appropriate cooling/warming therapies per order  - Administer medications as ordered  - Instruct and encourage patient and family to use good hand hygiene technique  - Identify and instruct in appropriate isolation precautions for identified infection/condition   Outcome: Progressing     Problem: SAFETY ADULT  Goal: Patient will remain free of falls  Description  INTERVENTIONS:  - Assess patient frequently for physical needs  -  Identify cognitive and physical deficits and behaviors that affect risk of falls    -  [place] fall precautions as indicated by assessment   - Educate patient/family on patient safety including physical limitations  - Instruct patient to call for assistance with activity based on assessment  - Modify environment to reduce risk of injury  - Consider OT/PT consult to assist with strengthening/mobility  Outcome: Progressing  Goal: Maintain or return to baseline ADL function  Description  INTERVENTIONS:  -  Assess patient's ability to carry out ADLs; assess patient's baseline for ADL function and identify physical deficits which impact ability to perform ADLs (bathing, care of mouth/teeth, toileting, grooming, dressing, etc )  - Assess/evaluate cause of self-care deficits   - Assess range of motion  - Assess patient's mobility; develop plan if impaired  - Assess patient's need for assistive devices and provide as appropriate  - Encourage maximum independence but intervene and supervise when necessary  - Involve family in performance of ADLs  - Assess for home care needs following discharge   - Consider OT consult to assist with ADL evaluation and planning for discharge  - Provide patient education as appropriate  Outcome: Progressing  Goal: Maintain or return mobility status to optimal level  Description  INTERVENTIONS:  - Assess patient's baseline mobility status (ambulation, transfers, stairs, etc )    - Identify cognitive and physical deficits and behaviors that affect mobility  - Identify mobility aids required to assist with transfers and/or ambulation (gait belt, sit-to-stand, lift, walker, cane, etc )  - [place] fall precautions as indicated by assessment  - Record patient progress and toleration of activity level on Mobility SBAR; progress patient to next Phase/Stage  - Instruct patient to call for assistance with activity based on assessment  - Consider rehabilitation consult to assist with strengthening/weightbearing, etc   Outcome: Progressing     Problem: DISCHARGE PLANNING  Goal: Discharge to home or other facility with appropriate resources  Description  INTERVENTIONS:  - Identify barriers to discharge w/patient and caregiver  - Arrange for needed discharge resources and transportation as appropriate  - Identify discharge learning needs (meds, wound care, etc )  - Arrange for interpretive services to assist at discharge as needed  - Refer to Case Management Department for coordinating discharge planning if the patient needs post-hospital services based on physician/advanced practitioner order or complex needs related to functional status, cognitive ability, or social support system  Outcome: Progressing     Problem: Knowledge Deficit  Goal: Patient/family/caregiver demonstrates understanding of disease process, treatment plan, medications, and discharge instructions  Description  Complete learning assessment and assess knowledge base    Interventions:  - Provide teaching at level of understanding  - Provide teaching via preferred learning methods  Outcome: Progressing

## 2020-07-15 NOTE — ASSESSMENT & PLAN NOTE
· Received venofer 300 mg IV x 1 dose per Nephrology  · Follow the CBC as an outpatient with Nephrology  · Transfuse for a hemoglobin less than 7 g/dl

## 2020-07-15 NOTE — DISCHARGE SUMMARY
Discharge- Ean Hernandez 1985, 38 y o  female MRN: [id number]    Unit/Bed#: 163-36 Encounter: [id number]    Primary Care Provider: Emy Oliver DO   Date and time admitted to hospital: 7/13/2020 12:49 PM        * Hypokalemia  Assessment & Plan  · The patient was seen in consultation by Nephrology during the hospitalization  · The patient received IV and PO potassium chloride supplementation during hospitalization per Nephrology  · The hypokalemia resolved by the time discharge  · Follow the patient's potassium level after discharge with Nephrology  · Continue home IV potassium chloride infusions per Nephrology    Normal anion gap metabolic acidosis  Assessment & Plan  · Treated with Isolyte IV fluids during the hospitalization  · Follow the sodium bicarbonate level and anion gap as an outpatient with Nephrology      Vitamin D deficiency  Assessment & Plan  · Continue vitamin D supplementation per Nephrology    Anemia  Assessment & Plan  · Received venofer 300 mg IV x 1 dose per Nephrology  · Follow the CBC as an outpatient with Nephrology  · Transfuse for a hemoglobin less than 7 g/dl      Discharging Physician / Practitioner: Kayla Talamantes DO  PCP: Emy Oliver DO  Admission Date:   Admission Orders (From admission, onward)     Ordered        07/14/20 1046  Inpatient Admission  Once         07/13/20 1356  Place in Observation  Once                   Discharge Date: 07/15/20    Consultations During Hospital Stay:  · Nephrology    Procedures Performed:   · None    Incidental Findings:   · None     Test Results Pending at Discharge (will require follow up): · None     Outpatient Tests Requested:  · Serial laboratory testing to monitor her potassium level, serum bicarbonate level, anion gap level, and renal function as an outpatient per Nephrology    Complications:  None    Reason for Admission:  Hypokalemia    Hospital Course:      Ean Hernandez is a 38 y o  female patient who originally presented to the hospital on 7/13/2020 due to hypokalemia  Please see above list of diagnoses and related plan for additional information  Condition at Discharge: good     Discharge Day Visit / Exam:     Subjective: The patient was seen and examined  The patient is doing better  No extremity paresthesias  No chest pain  No shortness of breath  No abdominal pain  No nausea or vomiting  Vitals: Blood Pressure: 103/56 (07/15/20 0720)  Pulse: 85 (07/15/20 0720)  Temperature: 98 2 °F (36 8 °C) (07/15/20 0720)  Temp Source: Oral (07/13/20 1534)  Respirations: 17 (07/15/20 0720)  Height: 5' 2" (157 5 cm) (07/13/20 1534)  Weight - Scale: 65 5 kg (144 lb 6 4 oz) (07/13/20 1534)  SpO2: 100 % (07/15/20 0720)  Exam:   Physical Exam  General:  NAD, follows commands  HEENT:  NC/AT, mucous membranes moist  Neck:  Supple, No JVP elevation  CV:  + S1, + S2, RRR  Pulm:  Lung fields are CTA bilaterally  Abd:  Soft, Non-tender, Non-distended  Ext:  No clubbing/cyanosis/edema  Skin:  No rashes  Neuro:  Awake, alert, oriented  Psych:  Normal mood and affect    Discharge instructions/Information to patient and family:   See after visit summary for information provided to patient and family  Provisions for Follow-Up Care:  See after visit summary for information related to follow-up care and any pertinent home health orders  Disposition:     Home    Planned Readmission:  No     Discharge Statement:  I spent 40 minutes discharging the patient  This time was spent on the day of discharge  I had direct contact with the patient on the day of discharge  Greater than 50% of the total time was spent examining patient, answering all patient questions, arranging and discussing plan of care with patient as well as directly providing post-discharge instructions  Additional time then spent on discharge activities      Discharge Medications:  See after visit summary for reconciled discharge medications provided to patient and family        ** Please Note: This note has been constructed using a voice recognition system **

## 2020-07-15 NOTE — PLAN OF CARE
Problem: METABOLIC, FLUID AND ELECTROLYTES - ADULT  Goal: Electrolytes maintained within normal limits  Description  INTERVENTIONS:  - Monitor labs and assess patient for signs and symptoms of electrolyte imbalances  - Administer electrolyte replacement as ordered  - Monitor response to electrolyte replacements, including repeat lab results as appropriate  - Instruct patient on fluid and nutrition as appropriate  Outcome: Completed     Problem: PAIN - ADULT  Goal: Verbalizes/displays adequate comfort level or baseline comfort level  Description  Interventions:  - Encourage patient to monitor pain and request assistance  - Assess pain using appropriate pain scale  - Administer analgesics based on type and severity of pain and evaluate response  - Implement non-pharmacological measures as appropriate and evaluate response  - Consider cultural and social influences on pain and pain management  - Notify physician/advanced practitioner if interventions unsuccessful or patient reports new pain  Outcome: Completed     Problem: INFECTION - ADULT  Goal: Absence or prevention of progression during hospitalization  Description  INTERVENTIONS:  - Assess and monitor for signs and symptoms of infection  - Monitor lab/diagnostic results  - Monitor all insertion sites, i e  indwelling lines, tubes, and drains  - Lequire appropriate cooling/warming therapies per order  - Administer medications as ordered  - Instruct and encourage patient and family to use good hand hygiene technique  - Identify and instruct in appropriate isolation precautions for identified infection/condition   Outcome: Completed     Problem: SAFETY ADULT  Goal: Patient will remain free of falls  Description  INTERVENTIONS:  - Assess patient frequently for physical needs  -  Identify cognitive and physical deficits and behaviors that affect risk of falls    -  [place] fall precautions as indicated by assessment   - Educate patient/family on patient safety including physical limitations  - Instruct patient to call for assistance with activity based on assessment  - Modify environment to reduce risk of injury  - Consider OT/PT consult to assist with strengthening/mobility  Outcome: Completed  Goal: Maintain or return to baseline ADL function  Description  INTERVENTIONS:  -  Assess patient's ability to carry out ADLs; assess patient's baseline for ADL function and identify physical deficits which impact ability to perform ADLs (bathing, care of mouth/teeth, toileting, grooming, dressing, etc )  - Assess/evaluate cause of self-care deficits   - Assess range of motion  - Assess patient's mobility; develop plan if impaired  - Assess patient's need for assistive devices and provide as appropriate  - Encourage maximum independence but intervene and supervise when necessary  - Involve family in performance of ADLs  - Assess for home care needs following discharge   - Consider OT consult to assist with ADL evaluation and planning for discharge  - Provide patient education as appropriate  Outcome: Completed  Goal: Maintain or return mobility status to optimal level  Description  INTERVENTIONS:  - Assess patient's baseline mobility status (ambulation, transfers, stairs, etc )    - Identify cognitive and physical deficits and behaviors that affect mobility  - Identify mobility aids required to assist with transfers and/or ambulation (gait belt, sit-to-stand, lift, walker, cane, etc )  - [place] fall precautions as indicated by assessment  - Record patient progress and toleration of activity level on Mobility SBAR; progress patient to next Phase/Stage  - Instruct patient to call for assistance with activity based on assessment  - Consider rehabilitation consult to assist with strengthening/weightbearing, etc   Outcome: Completed     Problem: DISCHARGE PLANNING  Goal: Discharge to home or other facility with appropriate resources  Description  INTERVENTIONS:  - Identify barriers to discharge w/patient and caregiver  - Arrange for needed discharge resources and transportation as appropriate  - Identify discharge learning needs (meds, wound care, etc )  - Arrange for interpretive services to assist at discharge as needed  - Refer to Case Management Department for coordinating discharge planning if the patient needs post-hospital services based on physician/advanced practitioner order or complex needs related to functional status, cognitive ability, or social support system  Outcome: Completed     Problem: Knowledge Deficit  Goal: Patient/family/caregiver demonstrates understanding of disease process, treatment plan, medications, and discharge instructions  Description  Complete learning assessment and assess knowledge base  Interventions:  - Provide teaching at level of understanding  - Provide teaching via preferred learning methods  Outcome: Completed     Problem: Potential for Falls  Goal: Patient will remain free of falls  Description  INTERVENTIONS:  - Assess patient frequently for physical needs  -  Identify cognitive and physical deficits and behaviors that affect risk of falls    -  [place] fall precautions as indicated by assessment   - Educate patient/family on patient safety including physical limitations  - Instruct patient to call for assistance with activity based on assessment  - Modify environment to reduce risk of injury  - Consider OT/PT consult to assist with strengthening/mobility  Outcome: Completed

## 2020-07-15 NOTE — ASSESSMENT & PLAN NOTE
· The patient was seen in consultation by Nephrology during the hospitalization  · The patient received IV and PO potassium chloride supplementation during hospitalization per Nephrology  · The hypokalemia resolved by the time discharge    · Follow the patient's potassium level after discharge with Nephrology  · Continue home IV potassium chloride infusions per Nephrology

## 2020-07-15 NOTE — UTILIZATION REVIEW
Continued Stay Review    Date: 7/15/20                          Current Patient Class: inpatient  Current Level of Care: med surg    HPI:35 y o  female initially admitted on 7/13 under observation, converted to inpatient admission 7/14/20 due to recurrent hypokalemia with serial K monitoring in progress with multiple doses IV and PO kcl, and IVF  Renal following  Assessment/Plan: denies pain, exam is wnl  h&h dropped from yesterday  IV venofer given x 1  k 3 7, ca 7 7  IVF-Plasmalyte in progress  dietician consulted and education provided on high potassium diet  Continues with oral kcl 5 x daily  IV k given x 1 today       Pertinent Labs/Diagnostic Results:       Results from last 7 days   Lab Units 07/15/20  0601 07/14/20  0620   WBC Thousand/uL 4 94 5 58   HEMOGLOBIN g/dL 9 7* 10 5*   HEMATOCRIT % 31 1* 33 3*   PLATELETS Thousands/uL 244 263   NEUTROS ABS Thousands/µL 2 27 2 75         Results from last 7 days   Lab Units 07/15/20  0601 07/14/20  2238 07/14/20  1437 07/14/20  0620 07/13/20  2334 07/13/20  1545   SODIUM mmol/L 139 137 138 137 136 136   POTASSIUM mmol/L 3 7 3 5 3 4* 4 0 3 4* 2 4*   CHLORIDE mmol/L 111* 109* 108 108 105 103   CO2 mmol/L 18* 17* 20* 17* 20* 21   ANION GAP mmol/L 10 11 10 12 11 12   BUN mg/dL 8 10 9 10 12 13   CREATININE mg/dL 0 83 0 90 0 98 0 91 0 95 1 00   EGFR ml/min/1 73sq m 91 83 74 81 77 73   CALCIUM mg/dL 7 7* 7 5* 7 6* 7 9* 7 9* 8 5   CALCIUM, IONIZED mmol/L 1 18  --   --   --   --   --    MAGNESIUM mg/dL 2 2  --   --  2 4  --  2 4   PHOSPHORUS mg/dL 3 1  --   --  3 3  --   --      Results from last 7 days   Lab Units 07/15/20  0601   AST U/L 15   ALT U/L 19   ALK PHOS U/L 34*   TOTAL PROTEIN g/dL 5 8*   ALBUMIN g/dL 2 8*   TOTAL BILIRUBIN mg/dL 0 20     Results from last 7 days   Lab Units 07/15/20  0601 07/14/20 2238 07/14/20  1437 07/14/20  0620 07/13/20  2334 07/13/20  1545 07/13/20  0806 07/10/20  1146   GLUCOSE RANDOM mg/dL 85 160* 92 80 87 81 82 88     Results from last 7 days   Lab Units 07/15/20  0601   CK TOTAL U/L 47     Results from last 7 days   Lab Units 07/15/20  0601   TROPONIN I ng/mL <0 02         Results from last 7 days   Lab Units 07/13/20  2334   PROTIME seconds 13 1   INR  0 99     Results from last 7 days   Lab Units 07/14/20  0620   FERRITIN ng/mL 5*       Results from last 7 days   Lab Units 07/14/20  0918   CLARITY UA  Slightly Cloudy   COLOR UA  Yellow   SPEC GRAV UA  1 020   PH UA  6 0   GLUCOSE UA mg/dl Negative   KETONES UA mg/dl Negative   BLOOD UA  Negative   PROTEIN UA mg/dl Negative   NITRITE UA  Negative   BILIRUBIN UA  Negative   UROBILINOGEN UA E U /dl 0 2   LEUKOCYTES UA  Negative   WBC UA /hpf 0-1*   RBC UA /hpf None Seen   BACTERIA UA /hpf Occasional   EPITHELIAL CELLS WET PREP /hpf Moderate*     Results from last 7 days   Lab Units 07/14/20  1207   URINE CULTURE  No Growth <1000 cfu/mL     Vital Signs:   Date/Time  Temp  Pulse  Resp  BP  MAP (mmHg)  SpO2  O2 Device  Patient Position - Orthostatic VS   07/15/20 07:20:33  98 2 °F (36 8 °C)  85  17  103/56  72  100 %  --  --   07/14/20 22:28:31  98 2 °F (36 8 °C)  82  16  104/59  74  98 %  --  --   07/14/20 15:09:25  98 3 °F (36 8 °C)  86  18  105/61  76  100 %  --  --   07/14/20 07:02:49  98 2 °F (36 8 °C)  78  16  108/62  77  100 %  --  --         Medications:   Scheduled Medications:  Medications:  AMILoride 5 mg Oral Daily   amitriptyline 50 mg Oral HS   enoxaparin 40 mg Subcutaneous Q24H   iron sucrose 300 mg Intravenous Once   pantoprazole 40 mg Oral Early Morning   potassium chloride 40 mEq Oral 5x Daily   sucralfate 1 g Oral BID AC   topiramate 100 mg Oral BID     Continuous IV Infusions:  multi-electrolyte 100 mL/hr Intravenous Continuous        Discharge Plan: TBD    Network Utilization Review Department  [email or web address]  ATTENTION: Please call with any questions or concerns to [phone] and carefully listen to the prompts so that you are directed to the right person  All voicemails are confidential   Mahsa Azevedo all requests for admission clinical reviews, approved or denied determinations and any other requests to dedicated fax number below belonging to the campus where the patient is receiving treatment   List of dedicated fax numbers for the Facilities:  [address] [place] [address] DENIALS (Administrative/Medical Necessity) [phone]   [address]  [facility] (Maternity/NICU/Pediatrics) [phone]   Bethanie Ang [phone]   [facility] [phone]   [facility] [phone]   [facility] [phone]   [address] [place] [address] [phone]   Olgacynthia Beales [phone]   [address] [facility] [address]  [address] [facility] [address] [facility] [phone]

## 2020-07-16 NOTE — UTILIZATION REVIEW
Notification of Discharge  This is a Notification of Discharge from our facility [address]  Please be advised that this patient has been discharge from our facility  Below you will find the admission and discharge date and time including the patients disposition  PRESENTATION DATE: 7/13/2020 12:49 PM  OBS ADMISSION DATE: 07/13/2020  IP ADMISSION DATE: 7/13/20 1249   DISCHARGE DATE: 7/15/2020  2:00 PM  DISPOSITION: Home/Self Care Home/Self Care   Admission Orders listed below:  Admission Orders (From admission, onward)     Ordered        07/14/20 1046  Inpatient Admission  Once         07/13/20 1356  Place in Observation  Once                   Please contact the UR Department if additional information is required to close this patient's authorization/case  [facility] Utilization Review Department  Main: [phone] x carefully listen to the prompts  All voicemails are confidential   [email or web address]  Send all requests for admission clinical reviews, approved or denied determinations and any other requests to dedicated fax number below belonging to the campus where the patient is receiving treatment   List of dedicated fax numbers:  [address] DENIALS (Administrative/Medical Necessity) [phone]   [address] (Maternity/NICU/Pediatrics) [phone]   Maria Del Carmen Reeder [phone]   [facility] [phone]   Foster Form [phone]   Teodora Almanza [facility] [address] [place] [address] [phone]   [facility]  [phone]   [address] [facility] [address]  [address] [facility] [address] [facility] [phone]

## 2020-07-20 ENCOUNTER — APPOINTMENT (OUTPATIENT)
Dept: LAB | Facility: HOSPITAL | Age: 35
End: 2020-07-20
Attending: INTERNAL MEDICINE
Payer: COMMERCIAL

## 2020-07-20 PROCEDURE — 80048 BASIC METABOLIC PNL TOTAL CA: CPT

## 2020-07-27 ENCOUNTER — HOSPITAL ENCOUNTER (INPATIENT)
Facility: HOSPITAL | Age: 35
LOS: 3 days | Discharge: HOME | DRG: 641 | End: 2020-07-30
Attending: FAMILY MEDICINE | Admitting: FAMILY MEDICINE
Payer: COMMERCIAL

## 2020-07-27 ENCOUNTER — APPOINTMENT (OUTPATIENT)
Dept: LAB | Facility: HOSPITAL | Age: 35
DRG: 641 | End: 2020-07-27
Attending: INTERNAL MEDICINE
Payer: COMMERCIAL

## 2020-07-27 ENCOUNTER — TELEPHONE (OUTPATIENT)
Dept: NEPHROLOGY | Facility: CLINIC | Age: 35
End: 2020-07-27

## 2020-07-27 DIAGNOSIS — G43.009 MIGRAINE WITHOUT AURA AND WITHOUT STATUS MIGRAINOSUS, NOT INTRACTABLE: ICD-10-CM

## 2020-07-27 DIAGNOSIS — E87.6 HYPOKALEMIA: Primary | ICD-10-CM

## 2020-07-27 DIAGNOSIS — E87.2 NORMAL ANION GAP METABOLIC ACIDOSIS: ICD-10-CM

## 2020-07-27 PROCEDURE — 80048 BASIC METABOLIC PNL TOTAL CA: CPT

## 2020-07-27 PROCEDURE — 93005 ELECTROCARDIOGRAM TRACING: CPT

## 2020-07-27 PROCEDURE — 99222 1ST HOSP IP/OBS MODERATE 55: CPT | Performed by: FAMILY MEDICINE

## 2020-07-27 RX ORDER — AMITRIPTYLINE HYDROCHLORIDE 25 MG/1
50 TABLET, FILM COATED ORAL
Status: DISCONTINUED | OUTPATIENT
Start: 2020-07-27 | End: 2020-07-30 | Stop reason: HOSPADM

## 2020-07-27 RX ORDER — POTASSIUM CHLORIDE 20MEQ/15ML
40 LIQUID (ML) ORAL
Status: DISCONTINUED | OUTPATIENT
Start: 2020-07-27 | End: 2020-07-29

## 2020-07-27 RX ORDER — AMILORIDE HYDROCHLORIDE 5 MG/1
5 TABLET ORAL DAILY
Status: DISCONTINUED | OUTPATIENT
Start: 2020-07-28 | End: 2020-07-30 | Stop reason: HOSPADM

## 2020-07-27 RX ORDER — POTASSIUM CHLORIDE 29.8 MG/ML
40 INJECTION INTRAVENOUS EVERY 4 HOURS
Status: COMPLETED | OUTPATIENT
Start: 2020-07-27 | End: 2020-07-28

## 2020-07-27 RX ORDER — SUCRALFATE 1 G/1
1 TABLET ORAL
Status: DISCONTINUED | OUTPATIENT
Start: 2020-07-28 | End: 2020-07-27

## 2020-07-27 RX ORDER — FERROUS SULFATE 325(65) MG
325 TABLET ORAL EVERY OTHER DAY
Status: DISCONTINUED | OUTPATIENT
Start: 2020-07-27 | End: 2020-07-30 | Stop reason: HOSPADM

## 2020-07-27 RX ORDER — ACETAMINOPHEN 325 MG/1
650 TABLET ORAL EVERY 6 HOURS PRN
Status: DISCONTINUED | OUTPATIENT
Start: 2020-07-27 | End: 2020-07-30 | Stop reason: HOSPADM

## 2020-07-27 RX ORDER — SUCRALFATE 1 G/1
1 TABLET ORAL
Status: DISCONTINUED | OUTPATIENT
Start: 2020-07-27 | End: 2020-07-30 | Stop reason: HOSPADM

## 2020-07-27 RX ORDER — CHOLECALCIFEROL (VITAMIN D3) 10 MCG
1 TABLET ORAL
Status: DISCONTINUED | OUTPATIENT
Start: 2020-07-27 | End: 2020-07-30 | Stop reason: HOSPADM

## 2020-07-27 RX ORDER — THIAMINE MONONITRATE (VIT B1) 100 MG
100 TABLET ORAL DAILY
Status: DISCONTINUED | OUTPATIENT
Start: 2020-07-28 | End: 2020-07-30 | Stop reason: HOSPADM

## 2020-07-27 RX ORDER — ONDANSETRON 2 MG/ML
4 INJECTION INTRAMUSCULAR; INTRAVENOUS EVERY 6 HOURS PRN
Status: DISCONTINUED | OUTPATIENT
Start: 2020-07-27 | End: 2020-07-30 | Stop reason: HOSPADM

## 2020-07-27 RX ORDER — PANTOPRAZOLE SODIUM 40 MG/1
40 TABLET, DELAYED RELEASE ORAL
Status: DISCONTINUED | OUTPATIENT
Start: 2020-07-28 | End: 2020-07-30 | Stop reason: HOSPADM

## 2020-07-27 RX ORDER — TOPIRAMATE 100 MG/1
100 TABLET, FILM COATED ORAL 2 TIMES DAILY
Status: DISCONTINUED | OUTPATIENT
Start: 2020-07-27 | End: 2020-07-28

## 2020-07-27 RX ORDER — CALCIUM CARBONATE 200(500)MG
1000 TABLET,CHEWABLE ORAL DAILY PRN
Status: DISCONTINUED | OUTPATIENT
Start: 2020-07-27 | End: 2020-07-30 | Stop reason: HOSPADM

## 2020-07-27 RX ORDER — ZINC SULFATE 50(220)MG
220 CAPSULE ORAL DAILY
Status: DISCONTINUED | OUTPATIENT
Start: 2020-07-28 | End: 2020-07-30 | Stop reason: HOSPADM

## 2020-07-27 RX ADMIN — FERROUS SULFATE TAB 325 MG (65 MG ELEMENTAL FE) 325 MG: 325 (65 FE) TAB at 18:42

## 2020-07-27 RX ADMIN — POTASSIUM CHLORIDE 40 MEQ: 29.8 INJECTION, SOLUTION INTRAVENOUS at 18:48

## 2020-07-27 RX ADMIN — Medication 1 CAPSULE: at 18:42

## 2020-07-27 RX ADMIN — SUCRALFATE 1 G: 1 TABLET ORAL at 18:57

## 2020-07-27 RX ADMIN — POTASSIUM CHLORIDE 40 MEQ: 20 SOLUTION ORAL at 21:53

## 2020-07-27 RX ADMIN — TOPIRAMATE 100 MG: 100 TABLET, FILM COATED ORAL at 18:42

## 2020-07-27 RX ADMIN — POTASSIUM CHLORIDE 40 MEQ: 20 SOLUTION ORAL at 18:42

## 2020-07-27 RX ADMIN — AMITRIPTYLINE HYDROCHLORIDE 50 MG: 25 TABLET, FILM COATED ORAL at 21:53

## 2020-07-27 RX ADMIN — POTASSIUM CHLORIDE 40 MEQ: 29.8 INJECTION, SOLUTION INTRAVENOUS at 23:00

## 2020-07-27 NOTE — TELEPHONE ENCOUNTER
Called and spoke to patient  She states She had a tooth abscess and was vomiting from it draining into her stomach  She had a root canal last Monday  She did infusion Tues, Thurs, Sat, & Sun  She does feel exhausted run down, states her hands are useless, numb  She is aware Dr Kassi Benson would like her to go to hospital to be admitted Sanjiv Gonzalez    She will get  and will go to the hospital

## 2020-07-27 NOTE — PLAN OF CARE
Problem: GASTROINTESTINAL - ADULT  Goal: Minimal or absence of nausea and/or vomiting  Description  INTERVENTIONS:  - Administer IV fluids if ordered to ensure adequate hydration  - Maintain NPO status until nausea and vomiting are resolved  - Nasogastric tube if ordered  - Administer ordered antiemetic medications as needed  - Provide nonpharmacologic comfort measures as appropriate  - Advance diet as tolerated, if ordered  - Consider nutrition services referral to assist patient with adequate nutrition and appropriate food choices  Outcome: Progressing  Goal: Maintains or returns to baseline bowel function  Description  INTERVENTIONS:  - Assess bowel function  - Encourage oral fluids to ensure adequate hydration  - Administer IV fluids if ordered to ensure adequate hydration  - Administer ordered medications as needed  - Encourage mobilization and activity  - Consider nutritional services referral to assist patient with adequate nutrition and appropriate food choices  Outcome: Progressing  Goal: Maintains adequate nutritional intake  Description  INTERVENTIONS:  - Monitor percentage of each meal consumed  - Identify factors contributing to decreased intake, treat as appropriate  - Assist with meals as needed  - Monitor I&O, weight, and lab values if indicated  - Obtain nutrition services referral as needed  Outcome: Progressing     Problem: METABOLIC, FLUID AND ELECTROLYTES - ADULT  Goal: Electrolytes maintained within normal limits  Description  INTERVENTIONS:  - Monitor labs and assess patient for signs and symptoms of electrolyte imbalances  - Administer electrolyte replacement as ordered  - Monitor response to electrolyte replacements, including repeat lab results as appropriate  - Instruct patient on fluid and nutrition as appropriate  Outcome: Progressing  Goal: Fluid balance maintained  Description  INTERVENTIONS:  - Monitor labs   - Monitor I/O and WT  - Instruct patient on fluid and nutrition as appropriate  - Assess for signs & symptoms of volume excess or deficit  Outcome: Progressing     Problem: MUSCULOSKELETAL - ADULT  Goal: Maintain or return mobility to safest level of function  Description  INTERVENTIONS:  - Assess patient's ability to carry out ADLs; assess patient's baseline for ADL function and identify physical deficits which impact ability to perform ADLs (bathing, care of mouth/teeth, toileting, grooming, dressing, etc )  - Assess/evaluate cause of self-care deficits   - Assess range of motion  - Assess patient's mobility  - Assess patient's need for assistive devices and provide as appropriate  - Encourage maximum independence but intervene and supervise when necessary  - Involve family in performance of ADLs  - Assess for home care needs following discharge   - Consider OT consult to assist with ADL evaluation and planning for discharge  - Provide patient education as appropriate  Outcome: Progressing  Goal: Maintain proper alignment of affected body part  Description  INTERVENTIONS:  - Support, maintain and protect limb and body alignment  - Provide patient/ family with appropriate education  Outcome: Progressing     Problem: PAIN - ADULT  Goal: Verbalizes/displays adequate comfort level or baseline comfort level  Description  Interventions:  - Encourage patient to monitor pain and request assistance  - Assess pain using appropriate pain scale  - Administer analgesics based on type and severity of pain and evaluate response  - Implement non-pharmacological measures as appropriate and evaluate response  - Consider cultural and social influences on pain and pain management  - Notify physician/advanced practitioner if interventions unsuccessful or patient reports new pain  Outcome: Progressing     Problem: SAFETY ADULT  Goal: Patient will remain free of falls  Description  INTERVENTIONS:  - Assess patient frequently for physical needs  -  Identify cognitive and physical deficits and behaviors that affect risk of falls    -  [place] fall precautions as indicated by assessment   - Educate patient/family on patient safety including physical limitations  - Instruct patient to call for assistance with activity based on assessment  - Modify environment to reduce risk of injury  - Consider OT/PT consult to assist with strengthening/mobility  Outcome: Progressing  Goal: Maintain or return to baseline ADL function  Description  INTERVENTIONS:  -  Assess patient's ability to carry out ADLs; assess patient's baseline for ADL function and identify physical deficits which impact ability to perform ADLs (bathing, care of mouth/teeth, toileting, grooming, dressing, etc )  - Assess/evaluate cause of self-care deficits   - Assess range of motion  - Assess patient's mobility; develop plan if impaired  - Assess patient's need for assistive devices and provide as appropriate  - Encourage maximum independence but intervene and supervise when necessary  - Involve family in performance of ADLs  - Assess for home care needs following discharge   - Consider OT consult to assist with ADL evaluation and planning for discharge  - Provide patient education as appropriate  Outcome: Progressing  Goal: Maintain or return mobility status to optimal level  Description  INTERVENTIONS:  - Assess patient's baseline mobility status (ambulation, transfers, stairs, etc )    - Identify cognitive and physical deficits and behaviors that affect mobility  - Identify mobility aids required to assist with transfers and/or ambulation (gait belt, sit-to-stand, lift, walker, cane, etc )  - [place] fall precautions as indicated by assessment  - Record patient progress and toleration of activity level on Mobility SBAR; progress patient to next Phase/Stage  - Instruct patient to call for assistance with activity based on assessment  - Consider rehabilitation consult to assist with strengthening/weightbearing, etc   Outcome: Progressing     Problem: DISCHARGE PLANNING  Goal: Discharge to home or other facility with appropriate resources  Description  INTERVENTIONS:  - Identify barriers to discharge w/patient and caregiver  - Arrange for needed discharge resources and transportation as appropriate  - Identify discharge learning needs (meds, wound care, etc )  - Arrange for interpretive services to assist at discharge as needed  - Refer to Case Management Department for coordinating discharge planning if the patient needs post-hospital services based on physician/advanced practitioner order or complex needs related to functional status, cognitive ability, or social support system  Outcome: Progressing     Problem: Knowledge Deficit  Goal: Patient/family/caregiver demonstrates understanding of disease process, treatment plan, medications, and discharge instructions  Description  Complete learning assessment and assess knowledge base    Interventions:  - Provide teaching at level of understanding  - Provide teaching via preferred learning methods  Outcome: Progressing

## 2020-07-27 NOTE — ASSESSMENT & PLAN NOTE
Patient has known history of severe hypokalemia with home infusions of 80 mEq 3 times a week at home in addition to 40 mEq 5 times daily orally at home  Patient is followed outpatient by Nephrology and has an undiagnosed severe hypokalemia  Recently had some nausea vomiting and diarrhea due to a dental abscess and her potassium is low at 2 4  Sent as a direct admission from Nephrology to get potassium replacement  Will check a stat BMP  Will place on 80 mEq of IV potassium and continue oral regimen of 40 mEq 5 times daily and recheck BMP again at 2:00 p m  And then further replace as per levels    Consult placed to Nephrology  Placed on telemetry due to severe hypokalemia

## 2020-07-27 NOTE — PLAN OF CARE
Problem: GASTROINTESTINAL - ADULT  Goal: Minimal or absence of nausea and/or vomiting  Description  INTERVENTIONS:  - Administer IV fluids if ordered to ensure adequate hydration  - Maintain NPO status until nausea and vomiting are resolved  - Nasogastric tube if ordered  - Administer ordered antiemetic medications as needed  - Provide nonpharmacologic comfort measures as appropriate  - Advance diet as tolerated, if ordered  - Consider nutrition services referral to assist patient with adequate nutrition and appropriate food choices  Outcome: Progressing  Goal: Maintains or returns to baseline bowel function  Description  INTERVENTIONS:  - Assess bowel function  - Encourage oral fluids to ensure adequate hydration  - Administer IV fluids if ordered to ensure adequate hydration  - Administer ordered medications as needed  - Encourage mobilization and activity  - Consider nutritional services referral to assist patient with adequate nutrition and appropriate food choices  Outcome: Progressing  Goal: Maintains adequate nutritional intake  Description  INTERVENTIONS:  - Monitor percentage of each meal consumed  - Identify factors contributing to decreased intake, treat as appropriate  - Assist with meals as needed  - Monitor I&O, weight, and lab values if indicated  - Obtain nutrition services referral as needed  Outcome: Progressing     Problem: METABOLIC, FLUID AND ELECTROLYTES - ADULT  Goal: Electrolytes maintained within normal limits  Description  INTERVENTIONS:  - Monitor labs and assess patient for signs and symptoms of electrolyte imbalances  - Administer electrolyte replacement as ordered  - Monitor response to electrolyte replacements, including repeat lab results as appropriate  - Instruct patient on fluid and nutrition as appropriate  Outcome: Progressing  Goal: Fluid balance maintained  Description  INTERVENTIONS:  - Monitor labs   - Monitor I/O and WT  - Instruct patient on fluid and nutrition as appropriate  - Assess for signs & symptoms of volume excess or deficit  Outcome: Progressing     Problem: MUSCULOSKELETAL - ADULT  Goal: Maintain or return mobility to safest level of function  Description  INTERVENTIONS:  - Assess patient's ability to carry out ADLs; assess patient's baseline for ADL function and identify physical deficits which impact ability to perform ADLs (bathing, care of mouth/teeth, toileting, grooming, dressing, etc )  - Assess/evaluate cause of self-care deficits   - Assess range of motion  - Assess patient's mobility  - Assess patient's need for assistive devices and provide as appropriate  - Encourage maximum independence but intervene and supervise when necessary  - Involve family in performance of ADLs  - Assess for home care needs following discharge   - Consider OT consult to assist with ADL evaluation and planning for discharge  - Provide patient education as appropriate  Outcome: Progressing  Goal: Maintain proper alignment of affected body part  Description  INTERVENTIONS:  - Support, maintain and protect limb and body alignment  - Provide patient/ family with appropriate education  Outcome: Progressing     Problem: PAIN - ADULT  Goal: Verbalizes/displays adequate comfort level or baseline comfort level  Description  Interventions:  - Encourage patient to monitor pain and request assistance  - Assess pain using appropriate pain scale  - Administer analgesics based on type and severity of pain and evaluate response  - Implement non-pharmacological measures as appropriate and evaluate response  - Consider cultural and social influences on pain and pain management  - Notify physician/advanced practitioner if interventions unsuccessful or patient reports new pain  Outcome: Progressing     Problem: SAFETY ADULT  Goal: Patient will remain free of falls  Description  INTERVENTIONS:  - Assess patient frequently for physical needs  -  Identify cognitive and physical deficits and behaviors that affect risk of falls    -  [place] fall precautions as indicated by assessment   - Educate patient/family on patient safety including physical limitations  - Instruct patient to call for assistance with activity based on assessment  - Modify environment to reduce risk of injury  - Consider OT/PT consult to assist with strengthening/mobility  Outcome: Progressing  Goal: Maintain or return to baseline ADL function  Description  INTERVENTIONS:  -  Assess patient's ability to carry out ADLs; assess patient's baseline for ADL function and identify physical deficits which impact ability to perform ADLs (bathing, care of mouth/teeth, toileting, grooming, dressing, etc )  - Assess/evaluate cause of self-care deficits   - Assess range of motion  - Assess patient's mobility; develop plan if impaired  - Assess patient's need for assistive devices and provide as appropriate  - Encourage maximum independence but intervene and supervise when necessary  - Involve family in performance of ADLs  - Assess for home care needs following discharge   - Consider OT consult to assist with ADL evaluation and planning for discharge  - Provide patient education as appropriate  Outcome: Progressing  Goal: Maintain or return mobility status to optimal level  Description  INTERVENTIONS:  - Assess patient's baseline mobility status (ambulation, transfers, stairs, etc )    - Identify cognitive and physical deficits and behaviors that affect mobility  - Identify mobility aids required to assist with transfers and/or ambulation (gait belt, sit-to-stand, lift, walker, cane, etc )  - [place] fall precautions as indicated by assessment  - Record patient progress and toleration of activity level on Mobility SBAR; progress patient to next Phase/Stage  - Instruct patient to call for assistance with activity based on assessment  - Consider rehabilitation consult to assist with strengthening/weightbearing, etc   Outcome: Progressing     Problem: DISCHARGE PLANNING  Goal: Discharge to home or other facility with appropriate resources  Description  INTERVENTIONS:  - Identify barriers to discharge w/patient and caregiver  - Arrange for needed discharge resources and transportation as appropriate  - Identify discharge learning needs (meds, wound care, etc )  - Arrange for interpretive services to assist at discharge as needed  - Refer to Case Management Department for coordinating discharge planning if the patient needs post-hospital services based on physician/advanced practitioner order or complex needs related to functional status, cognitive ability, or social support system  Outcome: Progressing     Problem: Knowledge Deficit  Goal: Patient/family/caregiver demonstrates understanding of disease process, treatment plan, medications, and discharge instructions  Description  Complete learning assessment and assess knowledge base  Interventions:  - Provide teaching at level of understanding  - Provide teaching via preferred learning methods  Outcome: Progressing     Problem: Potential for Falls  Goal: Patient will remain free of falls  Description  INTERVENTIONS:  - Assess patient frequently for physical needs  -  Identify cognitive and physical deficits and behaviors that affect risk of falls    -  [place] fall precautions as indicated by assessment   - Educate patient/family on patient safety including physical limitations  - Instruct patient to call for assistance with activity based on assessment  - Modify environment to reduce risk of injury  - Consider OT/PT consult to assist with strengthening/mobility  Outcome: Progressing

## 2020-07-27 NOTE — H&P
H&P- Buster Petersen 1985, 38 y o  female MRN: [id number]    Unit/Bed#: -01 Encounter: [id number]    Primary Care Provider: Nahum Avila DO   Date and time admitted to hospital: 7/27/2020  5:14 PM        * Hypokalemia  Assessment & Plan  Patient has known history of severe hypokalemia with home infusions of 80 mEq 3 times a week at home in addition to 40 mEq 5 times daily orally at home  Patient is followed outpatient by Nephrology and has an undiagnosed severe hypokalemia  Recently had some nausea vomiting and diarrhea due to a dental abscess and her potassium is low at 2 4  Sent as a direct admission from Nephrology to get potassium replacement  Will check a stat BMP  Will place on 80 mEq of IV potassium and continue oral regimen of 40 mEq 5 times daily and recheck BMP again at 2:00 p m  And then further replace as per levels  Consult placed to Nephrology  Placed on telemetry due to severe hypokalemia        GERD (gastroesophageal reflux disease)  Assessment & Plan  Stable at this time  Continue Protonix    Vitamin B12 deficiency  Assessment & Plan  Patient is on vitamin B12 shots at home monthly  Continue the same    Vitamin D deficiency  Assessment & Plan  Patient is on vitamin-D 82679 units twice a week at home  Continue upon discharge    Migraine without aura and without status migrainosus, not intractable  Assessment & Plan  Stable at this time and not in exacerbation  Continue Topamax and Elavil      VTE Prophylaxis: Pharmacologic VTE Prophylaxis contraindicated due to Low risk  / reason for no mechanical VTE prophylaxis Low risk   Code Status:  Full code  POLST: There is no POLST form on file for this patient (pre-hospital)  Discussion with family:  None    Anticipated Length of Stay:  Patient will be admitted on an Inpatient basis with an anticipated length of stay of  more than 2 midnights     Justification for Hospital Stay:  Acute hypokalemia    Total Time for Visit, including Counseling / Coordination of Care: 1 hour  Greater than 50% of this total time spent on direct patient counseling and coordination of care  Chief Complaint:   Abnormal labs    History of Present Illness:    Eliot Patel is a 38 y o  female who presents with abnormal labs  Patient is followed by nephrology outpatient and has known history of severe hypokalemia for the last 2 years for which she is on home infusions potassium 3 times a week and also oral potassium supplementation  She was doing well and has not been admitted to the hospital and managing herself at home since March of this year  She recently developed dental abscess and ended up with nausea vomiting and diarrhea and had to take antibiotics  Labs done today show potassium of 2 4  Recommended to be directly admitted   Denies any chest pain or shortness of breath or palpitations or loss of consciousness or syncope  Complains of feeling weak and frustrated  Recently denied by her to be part for research trial is also frustrating her  Dental abscess and infection has completely resolved  She has history of gastric bypass surgery in the past however has been told that that is not the cause of her severe hypokalemia    Review of Systems:    Review of Systems   Constitutional: Positive for appetite change and fatigue  Negative for chills and fever  HENT: Negative for hearing loss, sore throat and trouble swallowing  Eyes: Negative for photophobia, discharge and visual disturbance  Respiratory: Negative for chest tightness and shortness of breath  Cardiovascular: Negative for chest pain and palpitations  Gastrointestinal: Negative for abdominal pain, blood in stool and vomiting  Endocrine: Negative for polydipsia and polyuria  Genitourinary: Negative for difficulty urinating, dysuria, flank pain and hematuria  Musculoskeletal: Negative for back pain and gait problem  Skin: Negative for rash     Allergic/Immunologic: Negative for environmental allergies and food allergies  Neurological: Positive for weakness  Negative for dizziness, seizures, syncope and headaches  Hematological: Does not bruise/bleed easily  Psychiatric/Behavioral: Negative for behavioral problems  All other systems reviewed and are negative  Past Medical and Surgical History:     Past Medical History:   Diagnosis Date    H  pylori infection     Hypokalemia     Migraine     Renal disorder     Rhabdomyolysis        Past Surgical History:   Procedure Laterality Date    ABDOMINAL SURGERY      APPENDECTOMY       SECTION      CHOLECYSTECTOMY      COSMETIC SURGERY      FL GUIDED CENTRAL VENOUS ACCESS DEVICE INSERTION  2018    GASTRIC BYPASS      HYSTERECTOMY      TUNNELED VENOUS PORT PLACEMENT N/A 2018    Procedure: INSERTION VENOUS PORT (PORT-A-CATH); Surgeon: Harriet Montiel DO;  Location: [facility];  Service: General       Meds/Allergies:    Prior to Admission medications    Medication Sig Start Date End Date Taking?  Authorizing Provider   AMILoride 5 mg tablet Take 1 tablet (5 mg total) by mouth daily 19  Yes Gracy Sampson DO   amitriptyline (ELAVIL) 50 mg tablet Take 50 mg by mouth daily at bedtime 18 Yes Historical Provider, MD   b complex-C-folic acid (NEPHRO-EDIE) 0 8 mg tablet Take 0 8 mg by mouth daily with dinner   Yes Historical Provider, MD   Catheters MISC by Does not apply route 2 (two) times a week Port care per [place] protocol 20  Yes Mike Woodson DO   cyanocobalamin 1,000 mcg/mL Inject 1 mL (1,000 mcg total) into a muscle every 30 (thirty) days 20  Yes Mike Woodson DO   ergocalciferol (ERGOCALCIFEROL) 1 25 MG (47899 UT) capsule Take 1 capsule (50,000 Units total) by mouth 2 (two) times a week Patient takes this med on   Patient taking differently: Take 50,000 Units by mouth once a week  and Thursdays 3/26/20  Yes Denise Watts MD   ferrous sulfate 325 (65 Fe) mg tablet Take 325 mg by mouth every other day Last took 3/9/20   Yes Historical Provider, MD   other medication, see sig, *Potassium infusions 2x week   Yes Historical Provider, MD   pantoprazole (PROTONIX) 40 mg tablet Take 1 tablet (40 mg total) by mouth daily 5/18/20  Yes Pao Tate, DO   potassium chloride 0 4 mEq/mL Infuse 200 mL (80 mEq total) into a venous catheter 3 (three) times a week 7/13/20  Yes Juan Carlos Ricketts DO   potassium chloride 10 % oral solution Take 30 mL (40 mEq total) by mouth 5 (five) times a day 7/13/20 8/12/20 Yes Juan Carlos Ricketts DO   sucralfate (CARAFATE) 1 g tablet Take 1 tablet (1 g total) by mouth daily  Patient taking differently: Take 1 g by mouth 2 (two) times a day before meals  5/18/20  Yes Pao Tate, DO   Syringe/Needle, Disp, (SYRINGE 3CC/25GX5/8") 25G X 5/8" 3 ML MISC Use once monthly for B12 injection  6/1/20  Yes Juan Carlos Ricketts DO   thiamine 100 MG tablet Take 100 mg by mouth daily   Yes Historical Provider, MD   topiramate (TOPAMAX) 100 mg tablet Take 100 mg by mouth 2 (two) times a day   Yes Historical Provider, MD   zinc sulfate (ZINCATE) 220 mg capsule Take 1 capsule (220 mg total) by mouth daily 8/9/19  Yes Elizabet Aguilar MD   meclizine (ANTIVERT) 25 mg tablet Take 25 mg by mouth daily at bedtime 8/27/18 7/27/20 Yes Historical Provider, MD   SUMAtriptan (IMITREX) 100 mg tablet Take 100 mg by mouth once as needed for migraine     Historical Provider, MD   ondansetron (ZOFRAN-ODT) 4 mg disintegrating tablet Take 4 mg by mouth every 6 (six) hours as needed for nausea or vomiting  7/27/20  Historical Provider, MD     I have reviewed home medications with patient personally  Allergies: Allergies   Allergen Reactions    Nsaids Other (See Comments)     Other reaction(s): Other (Please comment)  Should not take s/p bariatric surgery  Other reaction(s):  Other (See Comments)  Should not take as per prior records  Should not take s/p bariatric surgery  Has hx of gastric bypass      Prednisone      Nausea, vomiting, diarrhea       Social History:     Marital Status: /Civil Union   Social History     Substance and Sexual Activity   Alcohol Use Never    Alcohol/week: 0 0 standard drinks    Frequency: Never    Drinks per session: Patient refused    Binge frequency: Never    Comment: 0     Social History     Tobacco Use   Smoking Status Never Smoker   Smokeless Tobacco Never Used     Social History     Substance and Sexual Activity   Drug Use Never       Family History:    Family History   Problem Relation Age of Onset    No Known Problems Mother     Hypertension Father     Diabetes Father     Diabetes Maternal Grandfather        Physical Exam:     Vitals:   Blood Pressure: 128/77 (07/27/20 1720)  Pulse: 88 (07/27/20 1720)  Temperature: 98 4 °F (36 9 °C) (07/27/20 1720)  Respirations: 18 (07/27/20 1720)  SpO2: 100 % (07/27/20 1720)    Physical Exam   Constitutional: She is oriented to person, place, and time  She appears well-developed and well-nourished  She appears distressed  HENT:   Head: Normocephalic and atraumatic  Right Ear: External ear normal    Left Ear: External ear normal    Mouth/Throat: Oropharynx is clear and moist    Eyes: Conjunctivae and EOM are normal    Neck: Normal range of motion  Neck supple  Cardiovascular: Normal rate, regular rhythm and normal heart sounds  Pulmonary/Chest: Effort normal and breath sounds normal    Abdominal: Soft  Bowel sounds are normal  She exhibits no mass  There is no tenderness  There is no rebound and no guarding  Genitourinary:   Genitourinary Comments: deferred   Musculoskeletal: Normal range of motion  Neurological: She is alert and oriented to person, place, and time  She has normal reflexes  Cranial nerves 2-12 are normal   Normal neurological exam   Skin: Skin is warm and dry  No rash noted  Psychiatric: She has a normal mood and affect     Nursing note and vitals reviewed  Additional Data:     Lab Results: I have personally reviewed pertinent reports  Results from last 7 days   Lab Units 07/27/20  0924   SODIUM mmol/L 136   POTASSIUM mmol/L 2 4*   CHLORIDE mmol/L 102   CO2 mmol/L 22   BUN mg/dL 11   CREATININE mg/dL 1 10   ANION GAP mmol/L 12   CALCIUM mg/dL 8 2*   GLUCOSE RANDOM mg/dL 85                       Imaging: I have personally reviewed pertinent reports  No orders to display       EKG, Pathology, and Other Studies Reviewed on Admission:   · EKG:  Ordered    \[facility] [place] [facility]\""ri\[facility]\"" / [facility] Records Reviewed: Yes     ** Please Note: This note has been constructed using a voice recognition system   **

## 2020-07-27 NOTE — TELEPHONE ENCOUNTER
Phone call from [facility] @ [address] lab  Brittani's potassium was 2 4, drawn this morning     Just for reference, her K was 3 0  on 7/20

## 2020-07-28 LAB
ANION GAP SERPL CALCULATED.3IONS-SCNC: 13 MMOL/L (ref 4–13)
ANION GAP SERPL CALCULATED.3IONS-SCNC: 14 MMOL/L (ref 4–13)
ANION GAP SERPL CALCULATED.3IONS-SCNC: 15 MMOL/L (ref 4–13)
ATRIAL RATE: 78 BPM
BUN SERPL-MCNC: 12 MG/DL (ref 5–25)
BUN SERPL-MCNC: 13 MG/DL (ref 5–25)
BUN SERPL-MCNC: 13 MG/DL (ref 5–25)
CALCIUM SERPL-MCNC: 8.2 MG/DL (ref 8.3–10.1)
CALCIUM SERPL-MCNC: 8.2 MG/DL (ref 8.3–10.1)
CALCIUM SERPL-MCNC: 8.3 MG/DL (ref 8.3–10.1)
CHLORIDE SERPL-SCNC: 106 MMOL/L (ref 100–108)
CHLORIDE SERPL-SCNC: 107 MMOL/L (ref 100–108)
CHLORIDE SERPL-SCNC: 111 MMOL/L (ref 100–108)
CO2 SERPL-SCNC: 16 MMOL/L (ref 21–32)
CO2 SERPL-SCNC: 16 MMOL/L (ref 21–32)
CO2 SERPL-SCNC: 19 MMOL/L (ref 21–32)
CREAT SERPL-MCNC: 1.06 MG/DL (ref 0.6–1.3)
CREAT SERPL-MCNC: 1.12 MG/DL (ref 0.6–1.3)
CREAT SERPL-MCNC: 1.25 MG/DL (ref 0.6–1.3)
GFR SERPL CREATININE-BSD FRML MDRD: 55 ML/MIN/1.73SQ M
GFR SERPL CREATININE-BSD FRML MDRD: 63 ML/MIN/1.73SQ M
GFR SERPL CREATININE-BSD FRML MDRD: 68 ML/MIN/1.73SQ M
GLUCOSE SERPL-MCNC: 108 MG/DL (ref 65–140)
GLUCOSE SERPL-MCNC: 74 MG/DL (ref 65–140)
GLUCOSE SERPL-MCNC: 81 MG/DL (ref 65–140)
MAGNESIUM SERPL-MCNC: 2.3 MG/DL (ref 1.6–2.6)
P AXIS: 62 DEGREES
POTASSIUM SERPL-SCNC: 3 MMOL/L (ref 3.5–5.3)
POTASSIUM SERPL-SCNC: 3.1 MMOL/L (ref 3.5–5.3)
POTASSIUM SERPL-SCNC: 3.7 MMOL/L (ref 3.5–5.3)
PR INTERVAL: 118 MS
QRS AXIS: 37 DEGREES
QRSD INTERVAL: 102 MS
QT INTERVAL: 372 MS
QTC INTERVAL: 424 MS
SODIUM SERPL-SCNC: 137 MMOL/L (ref 136–145)
SODIUM SERPL-SCNC: 137 MMOL/L (ref 136–145)
SODIUM SERPL-SCNC: 143 MMOL/L (ref 136–145)
T WAVE AXIS: 60 DEGREES
VENTRICULAR RATE: 78 BPM

## 2020-07-28 PROCEDURE — 99254 IP/OBS CNSLTJ NEW/EST MOD 60: CPT | Performed by: NURSE PRACTITIONER

## 2020-07-28 PROCEDURE — 99232 SBSQ HOSP IP/OBS MODERATE 35: CPT | Performed by: FAMILY MEDICINE

## 2020-07-28 PROCEDURE — 80048 BASIC METABOLIC PNL TOTAL CA: CPT | Performed by: FAMILY MEDICINE

## 2020-07-28 PROCEDURE — 80048 BASIC METABOLIC PNL TOTAL CA: CPT | Performed by: NURSE PRACTITIONER

## 2020-07-28 PROCEDURE — 83735 ASSAY OF MAGNESIUM: CPT | Performed by: FAMILY MEDICINE

## 2020-07-28 RX ORDER — POTASSIUM CHLORIDE 14.9 MG/ML
20 INJECTION INTRAVENOUS
Status: COMPLETED | OUTPATIENT
Start: 2020-07-28 | End: 2020-07-28

## 2020-07-28 RX ORDER — SODIUM CHLORIDE, SODIUM GLUCONATE, SODIUM ACETATE, POTASSIUM CHLORIDE, MAGNESIUM CHLORIDE, SODIUM PHOSPHATE, DIBASIC, AND POTASSIUM PHOSPHATE .53; .5; .37; .037; .03; .012; .00082 G/100ML; G/100ML; G/100ML; G/100ML; G/100ML; G/100ML; G/100ML
100 INJECTION, SOLUTION INTRAVENOUS CONTINUOUS
Status: DISCONTINUED | OUTPATIENT
Start: 2020-07-28 | End: 2020-07-29

## 2020-07-28 RX ORDER — TOPIRAMATE 25 MG/1
50 TABLET ORAL 2 TIMES DAILY
Status: DISCONTINUED | OUTPATIENT
Start: 2020-07-28 | End: 2020-07-30 | Stop reason: HOSPADM

## 2020-07-28 RX ADMIN — POTASSIUM CHLORIDE 20 MEQ: 14.9 INJECTION, SOLUTION INTRAVENOUS at 08:04

## 2020-07-28 RX ADMIN — POTASSIUM CHLORIDE 40 MEQ: 20 SOLUTION ORAL at 10:28

## 2020-07-28 RX ADMIN — POTASSIUM CHLORIDE 20 MEQ: 14.9 INJECTION, SOLUTION INTRAVENOUS at 19:45

## 2020-07-28 RX ADMIN — AMITRIPTYLINE HYDROCHLORIDE 50 MG: 25 TABLET, FILM COATED ORAL at 21:18

## 2020-07-28 RX ADMIN — Medication 1 CAPSULE: at 17:26

## 2020-07-28 RX ADMIN — POTASSIUM CHLORIDE 20 MEQ: 14.9 INJECTION, SOLUTION INTRAVENOUS at 17:30

## 2020-07-28 RX ADMIN — ZINC SULFATE 220 MG (50 MG) CAPSULE 220 MG: CAPSULE at 09:33

## 2020-07-28 RX ADMIN — PANTOPRAZOLE SODIUM 40 MG: 40 TABLET, DELAYED RELEASE ORAL at 05:41

## 2020-07-28 RX ADMIN — SUCRALFATE 1 G: 1 TABLET ORAL at 15:50

## 2020-07-28 RX ADMIN — POTASSIUM CHLORIDE 20 MEQ: 14.9 INJECTION, SOLUTION INTRAVENOUS at 05:44

## 2020-07-28 RX ADMIN — POTASSIUM CHLORIDE 40 MEQ: 20 SOLUTION ORAL at 21:18

## 2020-07-28 RX ADMIN — AMILORIDE HYDROCHLORIDE 5 MG: 5 TABLET ORAL at 09:34

## 2020-07-28 RX ADMIN — TOPIRAMATE 100 MG: 100 TABLET, FILM COATED ORAL at 09:33

## 2020-07-28 RX ADMIN — POTASSIUM CHLORIDE 40 MEQ: 20 SOLUTION ORAL at 17:26

## 2020-07-28 RX ADMIN — POTASSIUM CHLORIDE 20 MEQ: 14.9 INJECTION, SOLUTION INTRAVENOUS at 15:20

## 2020-07-28 RX ADMIN — SODIUM CHLORIDE, SODIUM GLUCONATE, SODIUM ACETATE, POTASSIUM CHLORIDE, MAGNESIUM CHLORIDE, SODIUM PHOSPHATE, DIBASIC, AND POTASSIUM PHOSPHATE 100 ML/HR: .53; .5; .37; .037; .03; .012; .00082 INJECTION, SOLUTION INTRAVENOUS at 23:41

## 2020-07-28 RX ADMIN — TOPIRAMATE 50 MG: 25 TABLET, FILM COATED ORAL at 17:26

## 2020-07-28 RX ADMIN — SUCRALFATE 1 G: 1 TABLET ORAL at 07:32

## 2020-07-28 RX ADMIN — POTASSIUM CHLORIDE 40 MEQ: 20 SOLUTION ORAL at 07:33

## 2020-07-28 RX ADMIN — POTASSIUM CHLORIDE 40 MEQ: 20 SOLUTION ORAL at 14:14

## 2020-07-28 RX ADMIN — SODIUM CHLORIDE, SODIUM GLUCONATE, SODIUM ACETATE, POTASSIUM CHLORIDE, MAGNESIUM CHLORIDE, SODIUM PHOSPHATE, DIBASIC, AND POTASSIUM PHOSPHATE 100 ML/HR: .53; .5; .37; .037; .03; .012; .00082 INJECTION, SOLUTION INTRAVENOUS at 14:14

## 2020-07-28 RX ADMIN — THIAMINE HCL TAB 100 MG 100 MG: 100 TAB at 09:33

## 2020-07-28 NOTE — ASSESSMENT & PLAN NOTE
Stable at this time and not in exacerbation  Continue Topamax but I will taper her down to 50 mg p o  B i d  Is on had a discussion with the patient as she does have metabolic acidosis and hypokalemia which Topamax can attribute  Patient is in agreement to taper down as she discussed with her previous neurology she cannot be taken off it cold turkey secondary to having rebound migraines previously    and Elavil

## 2020-07-28 NOTE — PLAN OF CARE
Problem: GASTROINTESTINAL - ADULT  Goal: Minimal or absence of nausea and/or vomiting  Description  INTERVENTIONS:  - Administer IV fluids if ordered to ensure adequate hydration  - Maintain NPO status until nausea and vomiting are resolved  - Nasogastric tube if ordered  - Administer ordered antiemetic medications as needed  - Provide nonpharmacologic comfort measures as appropriate  - Advance diet as tolerated, if ordered  - Consider nutrition services referral to assist patient with adequate nutrition and appropriate food choices  Outcome: Progressing  Goal: Maintains or returns to baseline bowel function  Description  INTERVENTIONS:  - Assess bowel function  - Encourage oral fluids to ensure adequate hydration  - Administer IV fluids if ordered to ensure adequate hydration  - Administer ordered medications as needed  - Encourage mobilization and activity  - Consider nutritional services referral to assist patient with adequate nutrition and appropriate food choices  Outcome: Progressing  Goal: Maintains adequate nutritional intake  Description  INTERVENTIONS:  - Monitor percentage of each meal consumed  - Identify factors contributing to decreased intake, treat as appropriate  - Assist with meals as needed  - Monitor I&O, weight, and lab values if indicated  - Obtain nutrition services referral as needed  Outcome: Progressing     Problem: METABOLIC, FLUID AND ELECTROLYTES - ADULT  Goal: Electrolytes maintained within normal limits  Description  INTERVENTIONS:  - Monitor labs and assess patient for signs and symptoms of electrolyte imbalances  - Administer electrolyte replacement as ordered  - Monitor response to electrolyte replacements, including repeat lab results as appropriate  - Instruct patient on fluid and nutrition as appropriate  Outcome: Progressing  Goal: Fluid balance maintained  Description  INTERVENTIONS:  - Monitor labs   - Monitor I/O and WT  - Instruct patient on fluid and nutrition as appropriate  - Assess for signs & symptoms of volume excess or deficit  Outcome: Progressing     Problem: MUSCULOSKELETAL - ADULT  Goal: Maintain or return mobility to safest level of function  Description  INTERVENTIONS:  - Assess patient's ability to carry out ADLs; assess patient's baseline for ADL function and identify physical deficits which impact ability to perform ADLs (bathing, care of mouth/teeth, toileting, grooming, dressing, etc )  - Assess/evaluate cause of self-care deficits   - Assess range of motion  - Assess patient's mobility  - Assess patient's need for assistive devices and provide as appropriate  - Encourage maximum independence but intervene and supervise when necessary  - Involve family in performance of ADLs  - Assess for home care needs following discharge   - Consider OT consult to assist with ADL evaluation and planning for discharge  - Provide patient education as appropriate  Outcome: Progressing  Goal: Maintain proper alignment of affected body part  Description  INTERVENTIONS:  - Support, maintain and protect limb and body alignment  - Provide patient/ family with appropriate education  Outcome: Progressing     Problem: PAIN - ADULT  Goal: Verbalizes/displays adequate comfort level or baseline comfort level  Description  Interventions:  - Encourage patient to monitor pain and request assistance  - Assess pain using appropriate pain scale  - Administer analgesics based on type and severity of pain and evaluate response  - Implement non-pharmacological measures as appropriate and evaluate response  - Consider cultural and social influences on pain and pain management  - Notify physician/advanced practitioner if interventions unsuccessful or patient reports new pain  Outcome: Progressing     Problem: SAFETY ADULT  Goal: Patient will remain free of falls  Description  INTERVENTIONS:  - Assess patient frequently for physical needs  -  Identify cognitive and physical deficits and behaviors that affect risk of falls    -  [place] fall precautions as indicated by assessment   - Educate patient/family on patient safety including physical limitations  - Instruct patient to call for assistance with activity based on assessment  - Modify environment to reduce risk of injury  - Consider OT/PT consult to assist with strengthening/mobility  Outcome: Progressing  Goal: Maintain or return to baseline ADL function  Description  INTERVENTIONS:  -  Assess patient's ability to carry out ADLs; assess patient's baseline for ADL function and identify physical deficits which impact ability to perform ADLs (bathing, care of mouth/teeth, toileting, grooming, dressing, etc )  - Assess/evaluate cause of self-care deficits   - Assess range of motion  - Assess patient's mobility; develop plan if impaired  - Assess patient's need for assistive devices and provide as appropriate  - Encourage maximum independence but intervene and supervise when necessary  - Involve family in performance of ADLs  - Assess for home care needs following discharge   - Consider OT consult to assist with ADL evaluation and planning for discharge  - Provide patient education as appropriate  Outcome: Progressing  Goal: Maintain or return mobility status to optimal level  Description  INTERVENTIONS:  - Assess patient's baseline mobility status (ambulation, transfers, stairs, etc )    - Identify cognitive and physical deficits and behaviors that affect mobility  - Identify mobility aids required to assist with transfers and/or ambulation (gait belt, sit-to-stand, lift, walker, cane, etc )  - [place] fall precautions as indicated by assessment  - Record patient progress and toleration of activity level on Mobility SBAR; progress patient to next Phase/Stage  - Instruct patient to call for assistance with activity based on assessment  - Consider rehabilitation consult to assist with strengthening/weightbearing, etc   Outcome: Progressing     Problem: DISCHARGE PLANNING  Goal: Discharge to home or other facility with appropriate resources  Description  INTERVENTIONS:  - Identify barriers to discharge w/patient and caregiver  - Arrange for needed discharge resources and transportation as appropriate  - Identify discharge learning needs (meds, wound care, etc )  - Arrange for interpretive services to assist at discharge as needed  - Refer to Case Management Department for coordinating discharge planning if the patient needs post-hospital services based on physician/advanced practitioner order or complex needs related to functional status, cognitive ability, or social support system  Outcome: Progressing     Problem: Knowledge Deficit  Goal: Patient/family/caregiver demonstrates understanding of disease process, treatment plan, medications, and discharge instructions  Description  Complete learning assessment and assess knowledge base  Interventions:  - Provide teaching at level of understanding  - Provide teaching via preferred learning methods  Outcome: Progressing     Problem: Potential for Falls  Goal: Patient will remain free of falls  Description  INTERVENTIONS:  - Assess patient frequently for physical needs  -  Identify cognitive and physical deficits and behaviors that affect risk of falls    -  [place] fall precautions as indicated by assessment   - Educate patient/family on patient safety including physical limitations  - Instruct patient to call for assistance with activity based on assessment  - Modify environment to reduce risk of injury  - Consider OT/PT consult to assist with strengthening/mobility  Outcome: Progressing

## 2020-07-28 NOTE — UTILIZATION REVIEW
Notification of Inpatient Admission/Inpatient Authorization Request   This is a Notification of Inpatient Admission for [address]  Be advised that this patient was admitted to our facility under Inpatient Status  Contact Caprice Campbell at [phone] for additional admission information  [facility] [address] [facility] DEPT  DEDICATED -[phone]  Patient Name:   Jaswinder Michael   YOB: 1985       [address]   [address]:   [address]  Tax ID: [id number]  NPI: [id number] Attending Provider/NPI:  Phone:  Address: Sintia Kennedy Md [[id number]]  [phone]  SAME AS FACILITY   Place of Service Code: 24 Place of Service Name:  [address] [place] [address]   Start Date: 7/27/20 1714     Discharge Date & Time: No discharge date for patient encounter  Type of Admission: Inpatient Status Discharge Disposition (if discharged): Home/Self Care   Patient Diagnoses: Hypokalemia [E87 6]     Orders: Admission Orders (From admission, onward)     Ordered        07/27/20 1813  Inpatient Admission  Once                    Assigned Utilization Review Contact: Caprice Campbell  Utilization   Network Utilization Review Department  Phone: [phone]; Fax [phone]  Email: [email or web address]   ATTENTION PAYERS: Please call the assigned Utilization  directly with any questions or concerns ALL voicemails in the department are confidential  Send all requests for admission clinical reviews, approved or denied determinations and any other requests to dedicated fax number belonging to the campus where the patient is receiving treatment

## 2020-07-28 NOTE — CONSULTS
Alli Patino 38 y o  female MRN: [id number]  Unit/Bed#: -01 Encounter: [id number]        Assessment and Plan:      1  Acute on chronic symptomatic hypokalemia  · Directly admitted with a potassium of 2 6 millimoles per L  She is receiving aggressive replacement  BMP just drawn and potassium 3 1 millimoles per L  Will order 20 mEq potassium IV every 3 hours for 3 doses and recheck potassium level afterwards  · She has undergone extensive workup and was hopeful to be seen by [facility] und[facility]agnosed disease network however they have apparently not accepted her  · She continues on potassium-sparing diuretic and oral potassium supplementation  2  Metabolic acidosis  · This has been an issue in the past   She has previously required sodium bicarbonate tablets  Typically resolves with volume expansion with balanced salt solution  Primary team has decrease Topamax doses as this may be attributing to metabolic acidosis and hypokalemia  Patient will be provided with referral to neurology upon discharge  Apparently Magalis Castillo has not seen Neurology in a while because her primary neurologist left the practice  3  Iron deficiency anemia  · Provided patient with 300 mg IV Venofer during last hospital stay  4  GERD  · Was started on PPI and Carafate therapy for nausea, vomiting, GERD symptoms during admission at end of April  She has been doing well with this treatment for GERD associated with high-dose oral potassium        HPI:    Hannah Min is a 38 y o  female with an active problem list significant for hypokalemia for which she receives 80 mEq potassium infusion thrice weekly (recently increased from 60 mEq) and takes 200 mEq oral potassium daily, bariatric surgery, vitamin B12 deficiency, iron deficiency anemia, migraines, GERD who is well known to this service who was directly admitted to [address] [place] [address] 07/27/2020 at the advice of her primary nephrologist for symptomatic hypokalemia attributed to decreased oral intake and decreased intake of potassium supplement via the oral route due to dental abscess and dental procedure  Her potassium prior to presentation was 2 4 millimoles per L and she has been receiving aggressive replacement since  Upon discussion with the patient, she states that she was not eating or drinking well due to dental abscess  She also had vomiting episodes when abscess would "drain " She states that the abscess has been treated now status post root canal done last Monday  She has been presenting to infusion clinic as prescribed  She has no other issues today  Her symptoms of hypokalemia are improving however she still has some fiber eating in her hands and feet  She denies chest pain, dizziness, shortness of breath, nausea, vomiting, diarrhea, constipation, dysuria, urgency, frequency  Of note, she was being evaluated by [place] [facility]iagn[facility] disease Network for this chronic hyponatremia apparently they have returned back to her that they are not going to be pursuing her case  The medical record, including Care Everywhere and media tabs were reviewed  Reason for Consult:  Hypokalemia    Review of Systems:    A complete 10-point review of systems was performed  Aside from what was mentioned in the HPI, it is otherwise negative  Historical Information   Past Medical History:   Diagnosis Date    H  pylori infection     Hypokalemia     Migraine     Renal disorder     Rhabdomyolysis      Past Surgical History:   Procedure Laterality Date    ABDOMINAL SURGERY      APPENDECTOMY       SECTION      CHOLECYSTECTOMY      COSMETIC SURGERY      FL GUIDED CENTRAL VENOUS ACCESS DEVICE INSERTION  2018    GASTRIC BYPASS      HYSTERECTOMY      TUNNELED VENOUS PORT PLACEMENT N/A 2018    Procedure: INSERTION VENOUS PORT (PORT-A-CATH);   Surgeon: Rosamaria Garcia DO;  Location: [facility];  Service: General     Social History   Social History Substance and Sexual Activity   Alcohol Use Never    Alcohol/week: 0 0 standard drinks    Frequency: Never    Drinks per session: Patient refused    Binge frequency: Never    Comment: 0     Social History     Substance and Sexual Activity   Drug Use Never     Social History     Tobacco Use   Smoking Status Never Smoker   Smokeless Tobacco Never Used       Family History:   Family History   Problem Relation Age of Onset    No Known Problems Mother     Hypertension Father     Diabetes Father     Diabetes Maternal Grandfather        Medications:  Pertinent medications were reviewed    Current Facility-Administered Medications:  acetaminophen 650 mg Oral Q6H PRN Sophie Sunshine MD    AMILoride 5 mg Oral Daily Sophie Sunshine MD    amitriptyline 50 mg Oral HS Sophie Sunshine MD    b complex-vitamin C-folic acid 1 capsule Oral Daily With Dinner Sophie Sunshine MD    calcium carbonate 1,000 mg Oral Daily PRN Sophie Sunshine MD    ferrous sulfate 325 mg Oral Every Other Day Sophie Sunshine MD    multi-electrolyte 100 mL/hr Intravenous Continuous Carolee Reas CRNP Last Rate: 100 mL/hr (07/28/20 1414)   ondansetron 4 mg Intravenous Q6H PRN Sophie Sunshine MD    pantoprazole 40 mg Oral Early Morning Sophie Sunshine MD    potassium chloride 40 mEq Oral 5x Daily Sophie Sunshine MD    sucralfate 1 g Oral BID AC Sophie Sunshine MD    thiamine 100 mg Oral Daily Sophie Sunshine MD    topiramate 50 mg Oral BID Keegan Pyle MD    zinc sulfate 220 mg Oral Daily Sophie Sunshine MD          Allergies   Allergen Reactions    Nsaids Other (See Comments)     Other reaction(s): Other (Please comment)  Should not take s/p bariatric surgery  Other reaction(s):  Other (See Comments)  Should not take as per prior records  Should not take s/p bariatric surgery  Has hx of gastric bypass      Prednisone      Nausea, vomiting, diarrhea         Vitals:   /68   Pulse 90   Temp 98 5 °F (36 9 °C)   Resp 19   SpO2 100%   There is no height or weight on file to calculate BMI  SpO2: 100 %,    ,   O2 Device: None (Room air)      Intake/Output Summary (Last 24 hours) at 7/28/2020 1421  Last data filed at 7/28/2020 1028  Gross per 24 hour   Intake 100 ml   Output --   Net 100 ml     Invasive Devices     Central Venous Catheter Line            Port A Cath 12/22/18 Right Chest 584 days                Physical Exam:  General: conscious, cooperative, in no acute distress  Eyes: conjunctivae pink, anicteric sclerae  ENT: lips and mucous membranes moist  Neck: supple, no JVD, no masses  Chest: clear breath sounds bilateral, no crackles, ronchus or wheezings  CVS: S1 & S2, normal rate, regular rhythm  Abdomen: soft, non-tender, non-distended, normoactive bowel sounds  Extremities:  Trace edema of both legs  Skin: no rash  Neuro: awake, alert, oriented      Diagnostic Data:  Lab: I have personally reviewed pertinent lab results  ,   CBC:       CMP: Lab Results   Component Value Date    SODIUM 137 07/28/2020    K 3 1 (L) 07/28/2020     07/28/2020    CO2 16 (L) 07/28/2020    BUN 12 07/28/2020    CREATININE 1 12 07/28/2020    CALCIUM 8 2 (L) 07/28/2020    EGFR 63 07/28/2020   ,   PT/INR: No results found for: PT, INR,   Magnesium: No components found for: MAG,  Phosphorous: No results found for: PHOS    Microbiology:  @LABRCNTIP,(urinecx:7)@        ARIADNA Leyva    Portions of the record may have been created with voice recognition software  Occasional wrong word or "sound a like" substitutions may have occurred due to the inherent limitations of voice recognition software  Read the chart carefully and recognize, using context, where substitutions have occurred

## 2020-07-28 NOTE — PROGRESS NOTES
Patient is a readmission: was a [facility] 7/13-7/15  for hypokalemia as this is a chronic issue for the last 1 1/2 years  Patient does receive IV infusion 3 times a week at home  She self administrates and [facility] will do port changes weekly  ( Pt has been compliant with management and does seek treatment with Dr Darin Kruger)      CM met with patient at the bedside,baseline information  was obtained  CM discussed the role of CM in helping the patient develop a discharge plan and assist the patient in carry out their plan      07/28/20 8857   Patient Information   Mental Status Alert   Primary Caregiver Self   Support System Immediate family   Activities of Daily Living Prior to Admission   Functional Status Independent   Assistive Device No device needed   Living Arrangement Lives with someone;House   [address] Unemployed   Means of Transportation   Means of Transport to Appts: Drive Self       Patient has identified: her spouse as her primary contact and is available to assist upon dc  No POA/ Advance Directive  Pt verbalized her spouse would be the person assisting with decisions with making if need be  PCP: Remy              Pt has a prescription plan and [address] in [facility]  Patient is not a [place]:   Pt denies SA/MH history  Pt resides with her spouse and 2 children 7/11 in a 1 story home  House set up: ran  Functional level PTA: I/pta  DME use: none  Prior use of Home Care or STR:  [facility]  Transportation: pt drives, [facility]    CM reviewed d/c planning process including the following: identifying help at home, patient preference for d/c planning needs, availability of treatment team to discuss questions or concerns patient and/or family may have regarding understanding medications and recognizing signs and symptoms once discharged    CM also encouraged patient to follow up with all recommended appointments after discharge  Patient advised of importance for patient and family to participate in managing patients medical well being  DC plan at this time is  Home No needs identified-- Resumption of OP Services  ( Infusions)

## 2020-07-28 NOTE — UTILIZATION REVIEW
Initial Clinical Review    Admission: Date/Time/Statement: Admission Orders (From admission, onward)     Ordered        07/27/20 1813  Inpatient Admission  Once                   Orders Placed This Encounter   Procedures    Inpatient Admission     Standing Status:   Standing     Number of Occurrences:   1     Order Specific Question:   Admitting Physician     Answer:   Lianna Barker [[id number]]     Order Specific Question:   Level of Care     Answer:   Med Surg [16]     Order Specific Question:   Estimated length of stay     Answer:   More than 2 Midnights     Order Specific Question:   Certification     Answer:   I certify that inpatient services are medically necessary for this patient for a duration of greater than two midnights  See H&P and MD Progress Notes for additional information about the patient's course of treatment  Comments:   acute hypokalemia     Assessment/Plan: 38year old female directly admitted directly to inpatient bed for evaluation of abnormal labs, hypokalemia  H/O severe hypokalemia on home infusions 2 times a week in addition to po supplement  Followed by nephrology  H/O gastric bypass  Potassium 2 4 on blood work done on day of arrival   She is feeling weak  Give 80 Meq iV potassium in addition to po potassium  Monitor telemetry  REcheck BMP  EKG normal      7/28 UPdate: Recently had some nausea, vomiting and diarrhea due to dental abscess  Symptoms from that have resolved  H/O migraines for which she takes topamax which can cause hypokalemia and metabolic acidosis which she currently has  Tried to stop about a year ago, but had rebound migraines so dose was reduced  Potassium level improved today    REcheck BMP     Temperature Pulse Respirations Blood Pressure SpO2   07/27/20 1720 07/27/20 1720 07/27/20 1720 07/27/20 1720 07/27/20 1720   98 4 °F (36 9 °C) 88 18 128/77 100 %      Temp Source Heart Rate Source Patient Position - Orthostatic VS BP Location FiO2 (%)   07/28/20 9224 07/27/20 1745 -- -- --   Oral Monitor         Pain Score       07/27/20 1803       No Pain        Wt Readings from Last 1 Encounters:   07/13/20 65 5 kg (144 lb 6 4 oz)     Additional Vital Signs:   Date/Time Temp Pulse  Resp  BP  MAP (mmHg)  SpO2  O2 Device   07/28/20 11:16:51 98 5 °F (36 9 °C) 90  19  116/68  84  100 %  --   07/28/20 0745 -- --  --  --  --  --  None (Room air)   07/28/20 07:28:20 98 2 °F (36 8 °C) 77  19  115/68  84  100 %  --   07/28/20 02:57:17 97 5 °F (36 4 °C) 68  18  105/60  75  100 %  --   07/28/20 00:02:02 97 8 °F (36 6 °C) 66  16  104/60  75  99 %  --   07/27/20 1745 -- --  --  --  94  --  --   07/27/20 1740 -- --  --  --  --  --  None (Room air)   07/27/20 17:20:41 98 4 °F (36 9 °C) 88  18  128/77  94  100 %  --      Weights (last 14 days)     Pertinent Labs/Diagnostic Test Results:   7/27 EKG: Normal sinus rhythm  Nonspecific T wave abnormality  When compared with ECG of 27-APR-2020 10:49,  No significant change was found              Results from last 7 days   Lab Units 07/28/20  0825 07/28/20  0403 07/27/20  0924   SODIUM mmol/L 137 143 136   POTASSIUM mmol/L 3 7 3 0* 2 4*   CHLORIDE mmol/L 106 111* 102   CO2 mmol/L 16* 19* 22   ANION GAP mmol/L 15* 13 12   BUN mg/dL 13 13 11   CREATININE mg/dL 1 25 1 06 1 10   EGFR ml/min/1 73sq m 55 68 65   CALCIUM mg/dL 8 3 8 2* 8 2*   MAGNESIUM mg/dL  --  2 3  --              Results from last 7 days   Lab Units 07/28/20  0825 07/28/20  0403 07/27/20  0924   GLUCOSE RANDOM mg/dL 74 81 85       Past Medical History:   Diagnosis Date    H  pylori infection     Hypokalemia     Migraine     Renal disorder     Rhabdomyolysis        Admitting Diagnosis: Hypokalemia [E87 6]  Age/Sex: 38 y o  female  Admission Orders:  BMP  UP and OOB  Scheduled Medications:    Medications:  AMILoride 5 mg Oral Daily   amitriptyline 50 mg Oral HS   b complex-vitamin C-folic acid 1 capsule Oral Daily With Dinner   ferrous sulfate 325 mg Oral Every Other Day pantoprazole 40 mg Oral Early Morning   potassium chloride 40 mEq Oral 5x Daily   sucralfate 1 g Oral BID AC   thiamine 100 mg Oral Daily   topiramate 50 mg Oral BID   zinc sulfate 220 mg Oral Daily     Continuous IV Infusions:     PRN Meds:    acetaminophen 650 mg Oral Q6H PRN   calcium carbonate 1,000 mg Oral Daily PRN   ondansetron 4 mg Intravenous Q6H PRN       IP CONSULT TO NEPHROLOGY    Network Utilization Review Department  [email or web address]  ATTENTION: Please call with any questions or concerns to [phone] and carefully listen to the prompts so that you are directed to the right person  All voicemails are confidential   Alec Huber all requests for admission clinical reviews, approved or denied determinations and any other requests to dedicated fax number below belonging to the campus where the patient is receiving treatment   List of dedicated fax numbers for the Facilities:  [address] DENIALS (Administrative/Medical Necessity) [phone]   [address] (Maternity/NICU/Pediatrics) [phone]   Pat[place]isa [phone]   Carole Arteaga [phone]   [address] [place] [address] [phone]   Melva Stapleton [phone]   [facility] [address] [facility] [phone]   Faisal Fernández [phone]   [address] [facility]  [address] [facility] [address] [facility] [phone]

## 2020-07-28 NOTE — ASSESSMENT & PLAN NOTE
Patient has known history of severe hypokalemia with home infusions of 80 mEq 3 times a week at home in addition to 40 mEq 5 times daily orally at home  Patient is followed outpatient by Nephrology and has an undiagnosed severe hypokalemia  Recently had some nausea vomiting and diarrhea due to a dental abscess and her potassium is low at 2 4  Sent as a direct admission from Nephrology to get potassium replacement  She was supplemented today at the potassium is 3 7 she does not have any more GI issues  She will continue her 3 times a week 80 mEq IV and 40 p o  5 times a day  She was also placed on amiloride  Also I discussed with her the Topamax does have a severe side effect of hypokalemia metabolic acidosis which she has metabolic acidosis now  The they try taking her off before a year ago but she developed rebound migraines is so he was suggested by her previous neurologist to taper down discussed with her I will taper her down to 50 twice a day  And then she needs to follow-up with neurology as an outpatient to try to taper this more DC she tolerated    Nephrology is yet to see  BMP tomorrow  Removed from telemetry  If potassium remains stable tomorrow I will DC her home  Magnesium is above 2

## 2020-07-28 NOTE — PROGRESS NOTES
Progress Note - Erin Tyler 1985, 38 y o  female MRN: [id number]    Unit/Bed#: -01 Encounter: [id number]    Primary Care Provider: Beckie Neumann DO   Date and time admitted to hospital: 7/27/2020  5:14 PM        GERD (gastroesophageal reflux disease)  Assessment & Plan  Stable at this time  Continue Protonix    Vitamin B12 deficiency  Assessment & Plan  Patient is on vitamin B12 shots at home monthly  Continue the same    Vitamin D deficiency  Assessment & Plan  Patient is on vitamin-D 13705 units twice a week at home  Continue upon discharge    Migraine without aura and without status migrainosus, not intractable  Assessment & Plan  Stable at this time and not in exacerbation  Continue Topamax but I will taper her down to 50 mg p o  B i d  Is on had a discussion with the patient as she does have metabolic acidosis and hypokalemia which Topamax can attribute  Patient is in agreement to taper down as she discussed with her previous neurology she cannot be taken off it cold turkey secondary to having rebound migraines previously  and Elavil    * Hypokalemia  Assessment & Plan  Patient has known history of severe hypokalemia with home infusions of 80 mEq 3 times a week at home in addition to 40 mEq 5 times daily orally at home  Patient is followed outpatient by Nephrology and has an undiagnosed severe hypokalemia  Recently had some nausea vomiting and diarrhea due to a dental abscess and her potassium is low at 2 4  Sent as a direct admission from Nephrology to get potassium replacement  She was supplemented today at the potassium is 3 7 she does not have any more GI issues  She will continue her 3 times a week 80 mEq IV and 40 p o  5 times a day  She was also placed on amiloride  Also I discussed with her the Topamax does have a severe side effect of hypokalemia metabolic acidosis which she has metabolic acidosis now    The they try taking her off before a year ago but she developed rebound migraines is so he was suggested by her previous neurologist to taper down discussed with her I will taper her down to 50 twice a day  And then she needs to follow-up with neurology as an outpatient to try to taper this more DC she tolerated  Nephrology is yet to see  BMP tomorrow  Removed from telemetry  If potassium remains stable tomorrow I will DC her home  Magnesium is above 2            VTE Pharmacologic Prophylaxis:   Pharmacologic:  Ambulatory  Mechanical VTE Prophylaxis in Place: Yes    Patient Centered Rounds: I have performed bedside rounds with nursing staff today  Discussions with Specialists or Other Care Team Provider:  A discussed with nursing    Education and Discussions with Family / Patient:  Patient    Time Spent for Care: 30 minutes  More than 50% of total time spent on counseling and coordination of care as described above  Current Length of Stay: 1 day(s)    Current Patient Status: Inpatient   Certification Statement: The patient will continue to require additional inpatient hospital stay due to Hypokalemia    Discharge Plan:  Anticipate home 24 hours pending progress    Code Status: Level 1 - Full Code      Subjective:   Patient seen and examined denies any chest pain or shortness of breath nausea vomiting or abdominal pain no diarrhea    Objective:     Vitals:   Temp (24hrs), Av 1 °F (36 7 °C), Min:97 5 °F (36 4 °C), Max:98 5 °F (36 9 °C)    Temp:  [97 5 °F (36 4 °C)-98 5 °F (36 9 °C)] 98 5 °F (36 9 °C)  HR:  [66-90] 90  Resp:  [16-19] 19  BP: (104-128)/(60-77) 116/68  SpO2:  [99 %-100 %] 100 %  There is no height or weight on file to calculate BMI  Input and Output Summary (last 24 hours): Intake/Output Summary (Last 24 hours) at 2020 1131  Last data filed at 2020 1028  Gross per 24 hour   Intake 100 ml   Output --   Net 100 ml       Physical Exam:     Physical Exam   Constitutional: She is oriented to person, place, and time   She appears well-developed and well-nourished  HENT:   Head: Normocephalic and atraumatic  Eyes: Pupils are equal, round, and reactive to light  EOM are normal    Neck: Normal range of motion  Cardiovascular: Normal rate, regular rhythm and normal heart sounds  Pulmonary/Chest: Effort normal and breath sounds normal    Abdominal: Soft  Bowel sounds are normal    Musculoskeletal: Normal range of motion  Neurological: She is alert and oriented to person, place, and time  She has normal reflexes  cranial nerve 2-12 are normal   Normal neurological exam   Skin: Skin is warm  Psychiatric: She has a normal mood and affect  Additional Data:     Labs:        Results from last 7 days   Lab Units 07/28/20  0825   SODIUM mmol/L 137   POTASSIUM mmol/L 3 7   CHLORIDE mmol/L 106   CO2 mmol/L 16*   BUN mg/dL 13   CREATININE mg/dL 1 25   ANION GAP mmol/L 15*   CALCIUM mg/dL 8 3   GLUCOSE RANDOM mg/dL 74                           * I Have Reviewed All Lab Data Listed Above  * Additional Pertinent Lab Tests Reviewed:  All Labs Within Last 24 Hours Reviewed    Imaging:    Imaging Reports Reviewed Today Include:  None today  Imaging Personally Reviewed by Myself Includes:      Recent Cultures (last 7 days):           Last 24 Hours Medication List:     Current Facility-Administered Medications:  acetaminophen 650 mg Oral Q6H PRN Tamara Linton MD   AMILoride 5 mg Oral Daily Tamara Linton MD   amitriptyline 50 mg Oral HS Tamara Linton MD   b complex-vitamin C-folic acid 1 capsule Oral Daily With Dinner Tamara Linton MD   calcium carbonate 1,000 mg Oral Daily PRN Tamara Linton MD   ferrous sulfate 325 mg Oral Every Other Day Tamara Linton MD   ondansetron 4 mg Intravenous Q6H PRN Tamara Linton MD   pantoprazole 40 mg Oral Early Morning Tamara Linton MD   potassium chloride 40 mEq Oral 5x Daily Tamara Linton MD   sucralfate 1 g Oral BID AC Tamara Linton MD   thiamine 100 mg Oral Daily Tamara Linton MD   topiramate 50 mg Oral BID Tiffany Miller MD Freeman   zinc sulfate 220 mg Oral Daily Sophie Sunshine MD        Today, Patient Was Seen By: Keegan Pyle MD    ** Please Note: Dictation voice to text software may have been used in the creation of this document   **

## 2020-07-29 LAB
ANION GAP SERPL CALCULATED.3IONS-SCNC: 10 MMOL/L (ref 4–13)
ANION GAP SERPL CALCULATED.3IONS-SCNC: 11 MMOL/L (ref 4–13)
ANION GAP SERPL CALCULATED.3IONS-SCNC: 12 MMOL/L (ref 4–13)
ANION GAP SERPL CALCULATED.3IONS-SCNC: 12 MMOL/L (ref 4–13)
BUN SERPL-MCNC: 12 MG/DL (ref 5–25)
BUN SERPL-MCNC: 14 MG/DL (ref 5–25)
BUN SERPL-MCNC: 15 MG/DL (ref 5–25)
BUN SERPL-MCNC: 20 MG/DL (ref 5–25)
CALCIUM SERPL-MCNC: 7.5 MG/DL (ref 8.3–10.1)
CALCIUM SERPL-MCNC: 7.8 MG/DL (ref 8.3–10.1)
CALCIUM SERPL-MCNC: 7.9 MG/DL (ref 8.3–10.1)
CALCIUM SERPL-MCNC: 8.1 MG/DL (ref 8.3–10.1)
CHLORIDE SERPL-SCNC: 110 MMOL/L (ref 100–108)
CHLORIDE SERPL-SCNC: 110 MMOL/L (ref 100–108)
CHLORIDE SERPL-SCNC: 111 MMOL/L (ref 100–108)
CHLORIDE SERPL-SCNC: 112 MMOL/L (ref 100–108)
CO2 SERPL-SCNC: 17 MMOL/L (ref 21–32)
CO2 SERPL-SCNC: 18 MMOL/L (ref 21–32)
CO2 SERPL-SCNC: 19 MMOL/L (ref 21–32)
CO2 SERPL-SCNC: 20 MMOL/L (ref 21–32)
CREAT SERPL-MCNC: 1.02 MG/DL (ref 0.6–1.3)
CREAT SERPL-MCNC: 1.04 MG/DL (ref 0.6–1.3)
CREAT SERPL-MCNC: 1.05 MG/DL (ref 0.6–1.3)
CREAT SERPL-MCNC: 1.23 MG/DL (ref 0.6–1.3)
GFR SERPL CREATININE-BSD FRML MDRD: 57 ML/MIN/1.73SQ M
GFR SERPL CREATININE-BSD FRML MDRD: 68 ML/MIN/1.73SQ M
GFR SERPL CREATININE-BSD FRML MDRD: 69 ML/MIN/1.73SQ M
GFR SERPL CREATININE-BSD FRML MDRD: 71 ML/MIN/1.73SQ M
GLUCOSE SERPL-MCNC: 82 MG/DL (ref 65–140)
GLUCOSE SERPL-MCNC: 93 MG/DL (ref 65–140)
GLUCOSE SERPL-MCNC: 93 MG/DL (ref 65–140)
GLUCOSE SERPL-MCNC: 96 MG/DL (ref 65–140)
LACTATE SERPL-SCNC: 0.9 MMOL/L (ref 0.5–2)
POTASSIUM SERPL-SCNC: 2.9 MMOL/L (ref 3.5–5.3)
POTASSIUM SERPL-SCNC: 3.4 MMOL/L (ref 3.5–5.3)
POTASSIUM SERPL-SCNC: 3.9 MMOL/L (ref 3.5–5.3)
POTASSIUM SERPL-SCNC: 3.9 MMOL/L (ref 3.5–5.3)
SODIUM SERPL-SCNC: 139 MMOL/L (ref 136–145)
SODIUM SERPL-SCNC: 140 MMOL/L (ref 136–145)
SODIUM SERPL-SCNC: 141 MMOL/L (ref 136–145)
SODIUM SERPL-SCNC: 142 MMOL/L (ref 136–145)

## 2020-07-29 PROCEDURE — 80048 BASIC METABOLIC PNL TOTAL CA: CPT | Performed by: NURSE PRACTITIONER

## 2020-07-29 PROCEDURE — 83605 ASSAY OF LACTIC ACID: CPT | Performed by: INTERNAL MEDICINE

## 2020-07-29 PROCEDURE — 99232 SBSQ HOSP IP/OBS MODERATE 35: CPT | Performed by: FAMILY MEDICINE

## 2020-07-29 PROCEDURE — 80048 BASIC METABOLIC PNL TOTAL CA: CPT | Performed by: FAMILY MEDICINE

## 2020-07-29 PROCEDURE — 99232 SBSQ HOSP IP/OBS MODERATE 35: CPT | Performed by: INTERNAL MEDICINE

## 2020-07-29 PROCEDURE — 80048 BASIC METABOLIC PNL TOTAL CA: CPT | Performed by: INTERNAL MEDICINE

## 2020-07-29 RX ORDER — POTASSIUM CHLORIDE 20MEQ/15ML
40 LIQUID (ML) ORAL
Status: DISCONTINUED | OUTPATIENT
Start: 2020-07-29 | End: 2020-07-30 | Stop reason: HOSPADM

## 2020-07-29 RX ORDER — POTASSIUM CHLORIDE 14.9 MG/ML
20 INJECTION INTRAVENOUS
Status: DISCONTINUED | OUTPATIENT
Start: 2020-07-29 | End: 2020-07-29

## 2020-07-29 RX ORDER — POTASSIUM CHLORIDE 14.9 MG/ML
40 INJECTION INTRAVENOUS 2 TIMES DAILY
Status: DISCONTINUED | OUTPATIENT
Start: 2020-07-29 | End: 2020-07-29

## 2020-07-29 RX ORDER — POTASSIUM CHLORIDE 14.9 MG/ML
40 INJECTION INTRAVENOUS 2 TIMES DAILY
Status: DISCONTINUED | OUTPATIENT
Start: 2020-07-29 | End: 2020-07-30 | Stop reason: HOSPADM

## 2020-07-29 RX ORDER — POTASSIUM CHLORIDE 14.9 MG/ML
20 INJECTION INTRAVENOUS ONCE
Status: COMPLETED | OUTPATIENT
Start: 2020-07-29 | End: 2020-07-30

## 2020-07-29 RX ORDER — SODIUM BICARBONATE 650 MG/1
650 TABLET ORAL
Status: DISCONTINUED | OUTPATIENT
Start: 2020-07-29 | End: 2020-07-30 | Stop reason: HOSPADM

## 2020-07-29 RX ADMIN — AMILORIDE HYDROCHLORIDE 5 MG: 5 TABLET ORAL at 08:10

## 2020-07-29 RX ADMIN — SUCRALFATE 1 G: 1 TABLET ORAL at 16:11

## 2020-07-29 RX ADMIN — SUCRALFATE 1 G: 1 TABLET ORAL at 08:09

## 2020-07-29 RX ADMIN — POTASSIUM CHLORIDE 40 MEQ: 14.9 INJECTION, SOLUTION INTRAVENOUS at 16:11

## 2020-07-29 RX ADMIN — POTASSIUM CHLORIDE 40 MEQ: 20 SOLUTION ORAL at 21:16

## 2020-07-29 RX ADMIN — TOPIRAMATE 50 MG: 25 TABLET, FILM COATED ORAL at 18:31

## 2020-07-29 RX ADMIN — Medication 1 CAPSULE: at 16:11

## 2020-07-29 RX ADMIN — POTASSIUM CHLORIDE 40 MEQ: 20 SOLUTION ORAL at 12:42

## 2020-07-29 RX ADMIN — SODIUM CHLORIDE, SODIUM GLUCONATE, SODIUM ACETATE, POTASSIUM CHLORIDE, MAGNESIUM CHLORIDE, SODIUM PHOSPHATE, DIBASIC, AND POTASSIUM PHOSPHATE 100 ML/HR: .53; .5; .37; .037; .03; .012; .00082 INJECTION, SOLUTION INTRAVENOUS at 09:56

## 2020-07-29 RX ADMIN — POTASSIUM CHLORIDE 40 MEQ: 20 SOLUTION ORAL at 08:08

## 2020-07-29 RX ADMIN — TOPIRAMATE 50 MG: 25 TABLET, FILM COATED ORAL at 08:09

## 2020-07-29 RX ADMIN — AMITRIPTYLINE HYDROCHLORIDE 50 MG: 25 TABLET, FILM COATED ORAL at 21:16

## 2020-07-29 RX ADMIN — THIAMINE HCL TAB 100 MG 100 MG: 100 TAB at 08:09

## 2020-07-29 RX ADMIN — POTASSIUM CHLORIDE 20 MEQ: 14.9 INJECTION, SOLUTION INTRAVENOUS at 01:39

## 2020-07-29 RX ADMIN — SODIUM BICARBONATE 650 MG TABLET 650 MG: at 16:20

## 2020-07-29 RX ADMIN — POTASSIUM CHLORIDE 40 MEQ: 20 SOLUTION ORAL at 18:31

## 2020-07-29 RX ADMIN — FERROUS SULFATE TAB 325 MG (65 MG ELEMENTAL FE) 325 MG: 325 (65 FE) TAB at 08:10

## 2020-07-29 RX ADMIN — POTASSIUM CHLORIDE 20 MEQ: 14.9 INJECTION, SOLUTION INTRAVENOUS at 03:25

## 2020-07-29 RX ADMIN — ZINC SULFATE 220 MG (50 MG) CAPSULE 220 MG: CAPSULE at 08:10

## 2020-07-29 RX ADMIN — PANTOPRAZOLE SODIUM 40 MG: 40 TABLET, DELAYED RELEASE ORAL at 05:44

## 2020-07-29 NOTE — PROGRESS NOTES
Progress Note - Eveline Chandra 1985, 38 y o  female MRN: [id number]    Unit/Bed#: -01 Encounter: [id number]    Primary Care Provider: Elmira Sweeney DO   Date and time admitted to hospital: 7/27/2020  5:14 PM        Normal anion gap metabolic acidosis  Assessment & Plan  · She initially had diarrhea but the diarrhea has resolved   · She previously has been on bicarb tablets  She is also on Topamax which could worsen your acidosis which I started tapering her off   Initially could have been suspected the secondary to volume loss lactic acid is normal I discontinued her fluids and she still has acidosis with drop a bicarb to 19 I will start her on sodium bicarb 650 mg p o  B i d  Repeat a BMP tonight and tomorrow  Eventually needs to be completed titrated off with the Topamax as an outpatient    GERD (gastroesophageal reflux disease)  Assessment & Plan  Stable at this time  Continue Protonix    Vitamin B12 deficiency  Assessment & Plan  Patient is on vitamin B12 shots at home monthly  Continue the same    Vitamin D deficiency  Assessment & Plan  Patient is on vitamin-D 74868 units twice a week at home  Continue upon discharge    Migraine without aura and without status migrainosus, not intractable  Assessment & Plan  Stable at this time and not in exacerbation  Continue Topamax but I will taper her down to 50 mg p o  B i d  Is on had a discussion with the patient as she does have metabolic acidosis and hypokalemia which Topamax can attribute  Patient is in agreement to taper down as she discussed with her previous neurology she cannot be taken off it cold turkey secondary to having rebound migraines previously  and Elavil    * Hypokalemia  Assessment & Plan  Patient has known history of severe hypokalemia with home infusions of 80 mEq 3 times a week at home in addition to 40 mEq 5 times daily orally at home    Patient is followed outpatient by Nephrology and has an undiagnosed severe hypokalemia  She had a extensive workup done with unknown etiology she was supposed to follow-up at [facility] but they did not take her  Recently had some nausea vomiting and diarrhea due to a dental abscess and her potassium is low at 2 4  Sent as a direct admission from Nephrology to get potassium replacement  She will continue her 3 times a week 80 mEq IV and 40 p o  5 times a day  She was also placed on amiloride  Also I discussed with her the Topamax does have a severe side effect of hypokalemia metabolic acidosis which she has metabolic acidosis now  The they try taking her off before a year ago but she developed rebound migraines is so he was suggested by her previous neurologist to taper down discussed with her I will taper her down to 50 twice a day  And then she needs to follow-up with neurology as an outpatient to try to taper this more   Patient potassium fell again today again in the afternoon to 2 9 I will give her the 80 mEq IV that she gets 3 times a week and also will increase her outpatient potassium to 40 mEq p o  6 times a day  Will recheck another BMP today at night and also in the morning will check a magnesium  VTE Pharmacologic Prophylaxis:   Pharmacologic: Pharmacologic VTE Prophylaxis contraindicated due to Encourage ambulation  Mechanical VTE Prophylaxis in Place: Yes    Patient Centered Rounds: I have performed bedside rounds with nursing staff today  Discussions with Specialists or Other Care Team Provider:  Have discussed with Nephrology    Education and Discussions with Family / Patient:  Patient    Time Spent for Care: 30 minutes  More than 50% of total time spent on counseling and coordination of care as described above      Current Length of Stay: 2 day(s)    Current Patient Status: Inpatient   Certification Statement: The patient will continue to require additional inpatient hospital stay due to Persistent hypokalemia and acidosis    Discharge Plan:  24 hours pending progress    Code Status: Level 1 - Full Code      Subjective:   Patient seen and examined denies any complaints    Objective:     Vitals:   Temp (24hrs), Av 1 °F (36 7 °C), Min:97 8 °F (36 6 °C), Max:98 5 °F (36 9 °C)    Temp:  [97 8 °F (36 6 °C)-98 5 °F (36 9 °C)] 98 1 °F (36 7 °C)  HR:  [67-94] 75  Resp:  [12-18] 16  BP: ()/(62-68) 102/62  SpO2:  [98 %-100 %] 100 %  There is no height or weight on file to calculate BMI  Input and Output Summary (last 24 hours): Intake/Output Summary (Last 24 hours) at 2020 1442  Last data filed at 2020 1300  Gross per 24 hour   Intake 2529 63 ml   Output --   Net 2529 63 ml       Physical Exam:     Physical Exam   Constitutional: She is oriented to person, place, and time  She appears well-developed and well-nourished  HENT:   Head: Normocephalic and atraumatic  Eyes: Pupils are equal, round, and reactive to light  EOM are normal    Neck: Normal range of motion  Cardiovascular: Normal rate, regular rhythm and normal heart sounds  Pulmonary/Chest: Effort normal and breath sounds normal    Abdominal: Soft  Bowel sounds are normal    Musculoskeletal: Normal range of motion  Neurological: She is alert and oriented to person, place, and time  She has normal reflexes  cranial nerve 2-12 are normal   Normal neurological exam   Skin: Skin is warm  Psychiatric: She has a normal mood and affect  Additional Data:     Labs:        Results from last 7 days   Lab Units 20  1336   SODIUM mmol/L 141   POTASSIUM mmol/L 2 9*   CHLORIDE mmol/L 110*   CO2 mmol/L 19*   BUN mg/dL 12   CREATININE mg/dL 1 04   ANION GAP mmol/L 12   CALCIUM mg/dL 7 5*   GLUCOSE RANDOM mg/dL 96                 Results from last 7 days   Lab Units 20  1336   LACTIC ACID mmol/L 0 9           * I Have Reviewed All Lab Data Listed Above  * Additional Pertinent Lab Tests Reviewed:  All Labs Within Last 24 Hours Reviewed    Imaging:    Imaging Reports Reviewed Today Include: none today  Imaging Personally Reviewed by Myself Includes:      Recent Cultures (last 7 days):           Last 24 Hours Medication List:     Current Facility-Administered Medications:  acetaminophen 650 mg Oral Q6H PRN Venkata Tinajero MD   AMILoride 5 mg Oral Daily Venkata Tinajero MD   amitriptyline 50 mg Oral HS Venkata Tinajero MD   b complex-vitamin C-folic acid 1 capsule Oral Daily With Dinner Venkata Tinajero MD   calcium carbonate 1,000 mg Oral Daily PRN Venkata Tinajero MD   ferrous sulfate 325 mg Oral Every Other Day Venkata Tinajero MD   ondansetron 4 mg Intravenous Q6H PRN Venkata Tinajero MD   pantoprazole 40 mg Oral Early Morning Venkata Tinajero MD   potassium chloride 40 mEq Oral 6x Daily Markus Franks MD   potassium chloride 40 mEq Intravenous BID Markus Franks MD   sodium bicarbonate 650 mg Oral BID after meals Markus Franks MD   sucralfate 1 g Oral BID AC Venkata Tinajero MD   thiamine 100 mg Oral Daily Venkata Tinajero MD   topiramate 50 mg Oral BID Markus Franks MD   zinc sulfate 220 mg Oral Daily Venkata Tinajero MD        Today, Patient Was Seen By: Markus Franks MD    ** Please Note: Dictation voice to text software may have been used in the creation of this document   **

## 2020-07-29 NOTE — PROGRESS NOTES
Progress Note - Nephrology   Bertrand Hannon 38 y o  female MRN: [id number]  Unit/Bed#: -01 Encounter: [id number]    A/P:  1  Acute hypokalemia   Continue with typical oral supplementation 5 times a day 40 mEq liquid potassium chloride  Potassium level this morning is 3 9 millimole/L  Will need to have basic metabolic panel checked at 1:00 p m  To confirm it is appropriate  Continue with 4 time a week outpatient IV potassium infusions  2  Acidosis   Unclear etiology for the patient's decreased bicarbonate  Especially from the history standpoint, given the multiple episodes of vomiting, we would have expected increase in serum bicarb instead we had a significantly reduced bicarb  This may in fact have been due to lactic acidosis at the time presentation from volume depletion, but was not checked  I will check a lactate acid with next set of labs given that her serum bicarbonate remains reduced at 20 millimole/L  At the moment, would avoid oral supplementation and we will continue monitor closely in the outpatient setting  3  Iron deficiency anemia   Continue with Venofer infusions as indicated    4  Status post gastric bypass surgery       Follow up reason for today's visit:  Hypokalemia/acidosis    Hypokalemia    Patient Active Problem List   Diagnosis    Hypokalemia    History of gastric bypass    Migraine without aura and without status migrainosus, not intractable    Left-sided weakness    Hypophosphatemia    Anemia    Vitamin D deficiency    Elevated CPK    Vitamin B12 deficiency    Cervical lymphadenopathy    Fatigue    Paresthesia of both lower extremities    Paresthesias    B12 deficiency    Gastric bypass status for obesity    GERD (gastroesophageal reflux disease)    Migraine    WAGNER (obstructive sleep apnea)    Other intestinal malabsorption    Acute kidney injury ([address] )    Right ovarian cyst    Chest pain    Nausea & vomiting    Vertigo    Stress fracture of right foot with routine healing    Normal anion gap metabolic acidosis    Middle ear effusion         Subjective:   No acute events overnight  Objective:     Vitals: Blood pressure 102/62, pulse 75, temperature 98 1 °F (36 7 °C), resp  rate 16, SpO2 100 %  ,There is no height or weight on file to calculate BMI  Weight (last 2 days)     None            Intake/Output Summary (Last 24 hours) at 7/29/2020 1129  Last data filed at 7/29/2020 0956  Gross per 24 hour   Intake 2169 63 ml   Output --   Net 2169 63 ml     I/O last 3 completed shifts: In: 1269 6 [P O :120; I V :949 6; IV Piggyback:200]  Out: -          Physical Exam: /62   Pulse 75   Temp 98 1 °F (36 7 °C)   Resp 16   SpO2 100%     General Appearance:    Alert, cooperative, no distress, appears stated age   Head:    Normocephalic, without obvious abnormality, atraumatic   Eyes:    Conjunctiva/corneas clear   Ears:    Normal external ears   Nose:   Nares normal, septum midline, mucosa normal, no drainage    or sinus tenderness   Throat:   Lips, mucosa, and tongue normal; teeth and gums normal   Neck:   Supple   Back:     Symmetric, no curvature, ROM normal, no CVA tenderness   Lungs:     Clear to auscultation bilaterally, respirations unlabored   Chest wall:    No tenderness or deformity   Heart:    Regular rate and rhythm, S1 and S2 normal, no murmur, rub   or gallop   Abdomen:     Soft, non-tender, bowel sounds active   Extremities:   Extremities normal, atraumatic, no cyanosis or edema   Skin:   Skin color, texture, turgor normal, no rashes or lesions   Lymph nodes:   Cervical normal   Neurologic:   CNII-XII intact            Lab, Imaging and other studies: I have personally reviewed pertinent labs    CBC: No results found for: WBC, HGB, HCT, MCV, PLT, ADJUSTEDWBC, MCH, MCHC, RDW, MPV, NRBC  CMP:   Lab Results   Component Value Date    K 3 9 07/29/2020     (H) 07/29/2020    CO2 20 (L) 07/29/2020    BUN 14 07/29/2020    CREATININE 1 05 07/29/2020 CALCIUM 7 8 (L) 07/29/2020    EGFR 68 07/29/2020         Results from last 7 days   Lab Units 07/29/20  0548 07/29/20  0034 07/28/20  1356   POTASSIUM mmol/L 3 9 3 4* 3 1*   CHLORIDE mmol/L 112* 111* 107   CO2 mmol/L 20* 18* 16*   BUN mg/dL 14 15 12   CREATININE mg/dL 1 05 1 23 1 12   CALCIUM mg/dL 7 8* 8 1* 8 2*         Phosphorus: No results found for: PHOS  Magnesium: No results found for: MG  Urinalysis: No results found for: COLORU, CLARITYU, SPECGRAV, PHUR, LEUKOCYTESUR, NITRITE, PROTEINUA, GLUCOSEU, KETONESU, BILIRUBINUR, BLOODU  Ionized Calcium: No results found for: CAION  Coagulation: No results found for: PT, INR, APTT  Troponin: No results found for: TROPONINI  ABG: No results found for: PHART, VIE9WGA, PO2ART, CFO2GQK, E4MDQIVH, BEART, SOURCE  Radiology review:     IMAGING  No results found      Current Facility-Administered Medications   Medication Dose Route Frequency    acetaminophen (TYLENOL) tablet 650 mg  650 mg Oral Q6H PRN    AMILoride tablet 5 mg  5 mg Oral Daily    amitriptyline (ELAVIL) tablet 50 mg  50 mg Oral HS    b complex-vitamin C-folic acid (NEPHROCAPS) capsule 1 capsule  1 capsule Oral Daily With Dinner    calcium carbonate (TUMS) chewable tablet 1,000 mg  1,000 mg Oral Daily PRN    ferrous sulfate tablet 325 mg  325 mg Oral Every Other Day    multi-electrolyte (PLASMALYTE-A/ISOLYTE-S PH 7 4) IV solution  100 mL/hr Intravenous Continuous    ondansetron (ZOFRAN) injection 4 mg  4 mg Intravenous Q6H PRN    pantoprazole (PROTONIX) EC tablet 40 mg  40 mg Oral Early Morning    potassium chloride 10 % oral solution 40 mEq  40 mEq Oral 5x Daily    sucralfate (CARAFATE) tablet 1 g  1 g Oral BID AC    thiamine (VITAMIN B1) tablet 100 mg  100 mg Oral Daily    topiramate (TOPAMAX) tablet 50 mg  50 mg Oral BID    zinc sulfate (ZINCATE) capsule 220 mg  220 mg Oral Daily     Medications Discontinued During This Encounter   Medication Reason    meclizine (ANTIVERT) 25 mg tablet Error    ondansetron (ZOFRAN-ODT) 4 mg disintegrating tablet Error    sucralfate (CARAFATE) tablet 1 g     topiramate (TOPAMAX) tablet 100 mg     potassium chloride 20 mEq IVPB (premix)        Qamar Borrego,       This progress note was produced in part using a dictation device which may document imprecise wording from author's original intent

## 2020-07-29 NOTE — ASSESSMENT & PLAN NOTE
· She initially had diarrhea but the diarrhea has resolved   · She previously has been on bicarb tablets  She is also on Topamax which could worsen your acidosis which I started tapering her off   Initially could have been suspected the secondary to volume loss lactic acid is normal I discontinued her fluids and she still has acidosis with drop a bicarb to 19 I will start her on sodium bicarb 650 mg p o  B i d  Repeat a BMP tonight and tomorrow    Eventually needs to be completed titrated off with the Topamax as an outpatient

## 2020-07-29 NOTE — ASSESSMENT & PLAN NOTE
Patient has known history of severe hypokalemia with home infusions of 80 mEq 3 times a week at home in addition to 40 mEq 5 times daily orally at home  Patient is followed outpatient by Nephrology and has an undiagnosed severe hypokalemia  She had a extensive workup done with unknown etiology she was supposed to follow-up at [facility] but they did not take her  Recently had some nausea vomiting and diarrhea due to a dental abscess and her potassium is low at 2 4  Sent as a direct admission from Nephrology to get potassium replacement  She will continue her 3 times a week 80 mEq IV and 40 p o  5 times a day  She was also placed on amiloride  Also I discussed with her the Topamax does have a severe side effect of hypokalemia metabolic acidosis which she has metabolic acidosis now  The they try taking her off before a year ago but she developed rebound migraines is so he was suggested by her previous neurologist to taper down discussed with her I will taper her down to 50 twice a day  And then she needs to follow-up with neurology as an outpatient to try to taper this more   Patient potassium fell again today again in the afternoon to 2 9 I will give her the 80 mEq IV that she gets 3 times a week and also will increase her outpatient potassium to 40 mEq p o  6 times a day  Will recheck another BMP today at night and also in the morning will check a magnesium

## 2020-07-30 VITALS
HEART RATE: 82 BPM | OXYGEN SATURATION: 100 % | RESPIRATION RATE: 17 BRPM | DIASTOLIC BLOOD PRESSURE: 42 MMHG | TEMPERATURE: 97.3 F | SYSTOLIC BLOOD PRESSURE: 114 MMHG

## 2020-07-30 LAB
ANION GAP SERPL CALCULATED.3IONS-SCNC: 10 MMOL/L (ref 4–13)
ANION GAP SERPL CALCULATED.3IONS-SCNC: 12 MMOL/L (ref 4–13)
BUN SERPL-MCNC: 15 MG/DL (ref 5–25)
BUN SERPL-MCNC: 16 MG/DL (ref 5–25)
CALCIUM SERPL-MCNC: 8.2 MG/DL (ref 8.3–10.1)
CALCIUM SERPL-MCNC: 8.2 MG/DL (ref 8.3–10.1)
CHLORIDE SERPL-SCNC: 111 MMOL/L (ref 100–108)
CHLORIDE SERPL-SCNC: 112 MMOL/L (ref 100–108)
CO2 SERPL-SCNC: 16 MMOL/L (ref 21–32)
CO2 SERPL-SCNC: 19 MMOL/L (ref 21–32)
CREAT SERPL-MCNC: 0.87 MG/DL (ref 0.6–1.3)
CREAT SERPL-MCNC: 1.03 MG/DL (ref 0.6–1.3)
GFR SERPL CREATININE-BSD FRML MDRD: 70 ML/MIN/1.73SQ M
GFR SERPL CREATININE-BSD FRML MDRD: 86 ML/MIN/1.73SQ M
GLUCOSE SERPL-MCNC: 128 MG/DL (ref 65–140)
GLUCOSE SERPL-MCNC: 82 MG/DL (ref 65–140)
MAGNESIUM SERPL-MCNC: 2.3 MG/DL (ref 1.6–2.6)
POTASSIUM SERPL-SCNC: 3.3 MMOL/L (ref 3.5–5.3)
POTASSIUM SERPL-SCNC: 4 MMOL/L (ref 3.5–5.3)
SODIUM SERPL-SCNC: 139 MMOL/L (ref 136–145)
SODIUM SERPL-SCNC: 141 MMOL/L (ref 136–145)

## 2020-07-30 PROCEDURE — 83735 ASSAY OF MAGNESIUM: CPT | Performed by: FAMILY MEDICINE

## 2020-07-30 PROCEDURE — 99232 SBSQ HOSP IP/OBS MODERATE 35: CPT | Performed by: NURSE PRACTITIONER

## 2020-07-30 PROCEDURE — 99239 HOSP IP/OBS DSCHRG MGMT >30: CPT | Performed by: FAMILY MEDICINE

## 2020-07-30 PROCEDURE — 80048 BASIC METABOLIC PNL TOTAL CA: CPT | Performed by: FAMILY MEDICINE

## 2020-07-30 RX ORDER — SODIUM BICARBONATE 650 MG/1
650 TABLET ORAL 3 TIMES DAILY
Qty: 90 TABLET | Refills: 0 | Status: SHIPPED | OUTPATIENT
Start: 2020-07-30 | End: 2020-08-21 | Stop reason: HOSPADM

## 2020-07-30 RX ORDER — POTASSIUM CHLORIDE 20MEQ/15ML
40 LIQUID (ML) ORAL
Qty: 5400 ML | Refills: 0 | Status: ON HOLD | OUTPATIENT
Start: 2020-07-30 | End: 2020-08-21 | Stop reason: SDUPTHER

## 2020-07-30 RX ORDER — POTASSIUM CHLORIDE 20 MEQ/1
20 TABLET, EXTENDED RELEASE ORAL ONCE
Status: COMPLETED | OUTPATIENT
Start: 2020-07-30 | End: 2020-07-30

## 2020-07-30 RX ORDER — TOPIRAMATE 50 MG/1
50 TABLET, FILM COATED ORAL 2 TIMES DAILY
Qty: 60 TABLET | Refills: 0 | Status: SHIPPED | OUTPATIENT
Start: 2020-07-30 | End: 2020-08-21 | Stop reason: HOSPADM

## 2020-07-30 RX ADMIN — AMILORIDE HYDROCHLORIDE 5 MG: 5 TABLET ORAL at 08:20

## 2020-07-30 RX ADMIN — POTASSIUM CHLORIDE 40 MEQ: 20 SOLUTION ORAL at 10:48

## 2020-07-30 RX ADMIN — POTASSIUM CHLORIDE 40 MEQ: 20 SOLUTION ORAL at 13:55

## 2020-07-30 RX ADMIN — SUCRALFATE 1 G: 1 TABLET ORAL at 07:19

## 2020-07-30 RX ADMIN — THIAMINE HCL TAB 100 MG 100 MG: 100 TAB at 08:18

## 2020-07-30 RX ADMIN — PANTOPRAZOLE SODIUM 40 MG: 40 TABLET, DELAYED RELEASE ORAL at 05:04

## 2020-07-30 RX ADMIN — POTASSIUM CHLORIDE 20 MEQ: 1500 TABLET, EXTENDED RELEASE ORAL at 10:48

## 2020-07-30 RX ADMIN — SODIUM BICARBONATE 650 MG TABLET 650 MG: at 08:18

## 2020-07-30 RX ADMIN — ZINC SULFATE 220 MG (50 MG) CAPSULE 220 MG: CAPSULE at 08:18

## 2020-07-30 RX ADMIN — POTASSIUM CHLORIDE 40 MEQ: 14.9 INJECTION, SOLUTION INTRAVENOUS at 08:19

## 2020-07-30 RX ADMIN — POTASSIUM CHLORIDE 40 MEQ: 20 SOLUTION ORAL at 07:19

## 2020-07-30 RX ADMIN — TOPIRAMATE 50 MG: 25 TABLET, FILM COATED ORAL at 08:18

## 2020-07-30 NOTE — NURSING NOTE
Reviewed discharge instructions with patient  Verbalized understanding  Prescriptions sent to pharmacy  Patient came to ER with port accessed and will be going home with port accessed due to home treatments

## 2020-07-30 NOTE — ASSESSMENT & PLAN NOTE
Patient has known history of severe hypokalemia with home infusions of 80 mEq 3 times a week at home in addition to 40 mEq 5 times daily orally at home  Patient is followed outpatient by Nephrology and has an undiagnosed severe hypokalemia  She had a extensive workup done with unknown etiology she was supposed to follow-up at [facility] but they did not take her  Recently had some nausea vomiting and diarrhea due to a dental abscess and her potassium is low at 2 4  Sent as a direct admission from Nephrology to get potassium replacement  She will continue her 3 times a week 80 mEq IV and 40 p o  5 times a day  She was also placed on amiloride  Also I discussed with her the Topamax does have a severe side effect of hypokalemia metabolic acidosis which she has metabolic acidosis now  The they try taking her off before a year ago but she developed rebound migraines is so he was suggested by her previous neurologist to taper down discussed with her I will taper her down to 50 twice a day  And then she needs to follow-up with neurology as an outpatient to try to taper this more   Patient potassium fell again today again in the afternoon to 2 9 I will give her the 80 mEq IV that she gets 3 times a week and also will increase her outpatient potassium to 40 mEq p o  6 times a day    Finally her potassium remains stable at 4 she will be discharged on the renew regimen discussed with Nephrology she get a BMP on Monday follow-up next week with Nephrology

## 2020-07-30 NOTE — PLAN OF CARE
Problem: GASTROINTESTINAL - ADULT  Goal: Minimal or absence of nausea and/or vomiting  Description  INTERVENTIONS:  - Administer IV fluids if ordered to ensure adequate hydration  - Maintain NPO status until nausea and vomiting are resolved  - Nasogastric tube if ordered  - Administer ordered antiemetic medications as needed  - Provide nonpharmacologic comfort measures as appropriate  - Advance diet as tolerated, if ordered  - Consider nutrition services referral to assist patient with adequate nutrition and appropriate food choices  Outcome: Progressing  Goal: Maintains or returns to baseline bowel function  Description  INTERVENTIONS:  - Assess bowel function  - Encourage oral fluids to ensure adequate hydration  - Administer IV fluids if ordered to ensure adequate hydration  - Administer ordered medications as needed  - Encourage mobilization and activity  - Consider nutritional services referral to assist patient with adequate nutrition and appropriate food choices  Outcome: Progressing  Goal: Maintains adequate nutritional intake  Description  INTERVENTIONS:  - Monitor percentage of each meal consumed  - Identify factors contributing to decreased intake, treat as appropriate  - Assist with meals as needed  - Monitor I&O, weight, and lab values if indicated  - Obtain nutrition services referral as needed  Outcome: Progressing     Problem: METABOLIC, FLUID AND ELECTROLYTES - ADULT  Goal: Electrolytes maintained within normal limits  Description  INTERVENTIONS:  - Monitor labs and assess patient for signs and symptoms of electrolyte imbalances  - Administer electrolyte replacement as ordered  - Monitor response to electrolyte replacements, including repeat lab results as appropriate  - Instruct patient on fluid and nutrition as appropriate  Outcome: Progressing  Goal: Fluid balance maintained  Description  INTERVENTIONS:  - Monitor labs   - Monitor I/O and WT  - Instruct patient on fluid and nutrition as appropriate  - Assess for signs & symptoms of volume excess or deficit  Outcome: Progressing     Problem: MUSCULOSKELETAL - ADULT  Goal: Maintain or return mobility to safest level of function  Description  INTERVENTIONS:  - Assess patient's ability to carry out ADLs; assess patient's baseline for ADL function and identify physical deficits which impact ability to perform ADLs (bathing, care of mouth/teeth, toileting, grooming, dressing, etc )  - Assess/evaluate cause of self-care deficits   - Assess range of motion  - Assess patient's mobility  - Assess patient's need for assistive devices and provide as appropriate  - Encourage maximum independence but intervene and supervise when necessary  - Involve family in performance of ADLs  - Assess for home care needs following discharge   - Consider OT consult to assist with ADL evaluation and planning for discharge  - Provide patient education as appropriate  Outcome: Progressing  Goal: Maintain proper alignment of affected body part  Description  INTERVENTIONS:  - Support, maintain and protect limb and body alignment  - Provide patient/ family with appropriate education  Outcome: Progressing     Problem: PAIN - ADULT  Goal: Verbalizes/displays adequate comfort level or baseline comfort level  Description  Interventions:  - Encourage patient to monitor pain and request assistance  - Assess pain using appropriate pain scale  - Administer analgesics based on type and severity of pain and evaluate response  - Implement non-pharmacological measures as appropriate and evaluate response  - Consider cultural and social influences on pain and pain management  - Notify physician/advanced practitioner if interventions unsuccessful or patient reports new pain  Outcome: Progressing     Problem: SAFETY ADULT  Goal: Patient will remain free of falls  Description  INTERVENTIONS:  - Assess patient frequently for physical needs  -  Identify cognitive and physical deficits and behaviors that affect risk of falls    -  [place] fall precautions as indicated by assessment   - Educate patient/family on patient safety including physical limitations  - Instruct patient to call for assistance with activity based on assessment  - Modify environment to reduce risk of injury  - Consider OT/PT consult to assist with strengthening/mobility  Outcome: Progressing  Goal: Maintain or return to baseline ADL function  Description  INTERVENTIONS:  -  Assess patient's ability to carry out ADLs; assess patient's baseline for ADL function and identify physical deficits which impact ability to perform ADLs (bathing, care of mouth/teeth, toileting, grooming, dressing, etc )  - Assess/evaluate cause of self-care deficits   - Assess range of motion  - Assess patient's mobility; develop plan if impaired  - Assess patient's need for assistive devices and provide as appropriate  - Encourage maximum independence but intervene and supervise when necessary  - Involve family in performance of ADLs  - Assess for home care needs following discharge   - Consider OT consult to assist with ADL evaluation and planning for discharge  - Provide patient education as appropriate  Outcome: Progressing  Goal: Maintain or return mobility status to optimal level  Description  INTERVENTIONS:  - Assess patient's baseline mobility status (ambulation, transfers, stairs, etc )    - Identify cognitive and physical deficits and behaviors that affect mobility  - Identify mobility aids required to assist with transfers and/or ambulation (gait belt, sit-to-stand, lift, walker, cane, etc )  - [place] fall precautions as indicated by assessment  - Record patient progress and toleration of activity level on Mobility SBAR; progress patient to next Phase/Stage  - Instruct patient to call for assistance with activity based on assessment  - Consider rehabilitation consult to assist with strengthening/weightbearing, etc   Outcome: Progressing     Problem: DISCHARGE PLANNING  Goal: Discharge to home or other facility with appropriate resources  Description  INTERVENTIONS:  - Identify barriers to discharge w/patient and caregiver  - Arrange for needed discharge resources and transportation as appropriate  - Identify discharge learning needs (meds, wound care, etc )  - Arrange for interpretive services to assist at discharge as needed  - Refer to Case Management Department for coordinating discharge planning if the patient needs post-hospital services based on physician/advanced practitioner order or complex needs related to functional status, cognitive ability, or social support system  Outcome: Progressing     Problem: Knowledge Deficit  Goal: Patient/family/caregiver demonstrates understanding of disease process, treatment plan, medications, and discharge instructions  Description  Complete learning assessment and assess knowledge base  Interventions:  - Provide teaching at level of understanding  - Provide teaching via preferred learning methods  Outcome: Progressing     Problem: Potential for Falls  Goal: Patient will remain free of falls  Description  INTERVENTIONS:  - Assess patient frequently for physical needs  -  Identify cognitive and physical deficits and behaviors that affect risk of falls    -  [place] fall precautions as indicated by assessment   - Educate patient/family on patient safety including physical limitations  - Instruct patient to call for assistance with activity based on assessment  - Modify environment to reduce risk of injury  - Consider OT/PT consult to assist with strengthening/mobility  Outcome: Progressing

## 2020-07-30 NOTE — PROGRESS NOTES
NEPHROLOGY PROGRESS NOTE   Charly Valencia 38 y o  female MRN: [id number]  Unit/Bed#: -01 Encounter: [id number]    Assessment/Plan:    38year old female well known to this service for chronic hypokalemia managed with outpatient infusions who is admitted once again for symptomatic hypokalemia  Her oral potassium dose was increased to 6 times daily, she is being weaned off of tegretol for suspicions of resulting acidosis and hypokalemia from that and she will follow up Neurology as an outpatient, and she will be discharged on sodium bicarbonate tablets per hospitalist      1  Acute on chronic symptomatic hypokalemia  · Again, being managed with effusions also taken the outpatient setting  Her most recent potassium of 4 0 millimoles per L  She is no longer symptomatic  Plan is likely discharge today  She will resume outpatient infusions  2  Acidosis  · Unclear etiology  Consideration being given to tegretol and this is being weaned per hospitalist   She will be discharged with neurology follow-up as an outpatient  Was started on bicarbonate tablets  hopefully will be able to wean as an outpatient setting  3  GERD  · Continue PPI and Carafate  4  Iron deficiency anemia  · Received Venofer infusion during last hospitalization  5  Status post gastric bypass surgery    ROS  Seen and examined sitting in bed  States she is having a little bit worsening GERD symptoms from increased potassium does but otherwise no symptoms  A complete 10 point review of systems have been performed and are otherwise negative         Historical Information   Past Medical History:   Diagnosis Date    H  pylori infection     Hypokalemia     Migraine     Renal disorder     Rhabdomyolysis      Past Surgical History:   Procedure Laterality Date    ABDOMINAL SURGERY      APPENDECTOMY       SECTION      CHOLECYSTECTOMY      COSMETIC SURGERY      FL GUIDED CENTRAL VENOUS ACCESS DEVICE INSERTION  2018    GASTRIC BYPASS      HYSTERECTOMY      TUNNELED VENOUS PORT PLACEMENT N/A 12/21/2018    Procedure: INSERTION VENOUS PORT (PORT-A-CATH); Surgeon: Kimberley Hooker DO;  Location: [facility];  Service: General     Social History   Social History     Substance and Sexual Activity   Alcohol Use Never    Alcohol/week: 0 0 standard drinks    Frequency: Never    Drinks per session: Patient refused    Binge frequency: Never    Comment: 0     Social History     Substance and Sexual Activity   Drug Use Never     Social History     Tobacco Use   Smoking Status Never Smoker   Smokeless Tobacco Never Used       Family History:   Family History   Problem Relation Age of Onset    No Known Problems Mother     Hypertension Father     Diabetes Father     Diabetes Maternal Grandfather        Medications:  Pertinent medications were reviewed    Current Facility-Administered Medications:  acetaminophen 650 mg Oral Q6H PRN Tim Al MD    AMILoride 5 mg Oral Daily Tim Al MD    amitriptyline 50 mg Oral HS Tim Al MD    b complex-vitamin C-folic acid 1 capsule Oral Daily With Dinner Tim Al MD    calcium carbonate 1,000 mg Oral Daily PRN Tim Al MD    ferrous sulfate 325 mg Oral Every Other Day Tim Al MD    ondansetron 4 mg Intravenous Q6H PRN Tim Al MD    pantoprazole 40 mg Oral Early Morning Tim Al MD    potassium chloride 40 mEq Oral 6x Daily Kennedy Obrien MD    potassium chloride 40 mEq Intravenous BID Kennedy Obrien MD Last Rate: 40 mEq (07/30/20 0819)   sodium bicarbonate 650 mg Oral BID after meals Kennedy Obrien MD    sucralfate 1 g Oral BID AC Tim Al MD    thiamine 100 mg Oral Daily Tim Al MD    topiramate 50 mg Oral BID Kennedy Obrien MD    zinc sulfate 220 mg Oral Daily Tim Al MD          Allergies   Allergen Reactions    Nsaids Other (See Comments)     Other reaction(s): Other (Please comment)  Should not take s/p bariatric surgery  Other reaction(s):  Other (See Comments)  Should not take as per prior records  Should not take s/p bariatric surgery  Has hx of gastric bypass      Prednisone      Nausea, vomiting, diarrhea         Vitals:   BP (!) 114/42   Pulse 82   Temp (!) 97 3 °F (36 3 °C)   Resp 17   SpO2 100%   There is no height or weight on file to calculate BMI  SpO2: 100 %,   SpO2 Activity: At Rest,   O2 Device: None (Room air)      Intake/Output Summary (Last 24 hours) at 7/30/2020 1353  Last data filed at 7/30/2020 0839  Gross per 24 hour   Intake 956 67 ml   Output --   Net 956 67 ml     Invasive Devices     Central Venous Catheter Line            Port A Cath 12/22/18 Right Chest 586 days                Physical Exam  General: conscious, cooperative, in no acute distress  Eyes: conjunctivae pink, anicteric sclerae  ENT: lips and mucous membranes moist  Neck: supple, no JVD, no masses  Chest: clear breath sounds bilateral, no crackles, ronchus or wheezings  CVS: S1 & S2, normal rate, regular rhythm  Abdomen:  Obese, soft, non-tender, non-distended, normoactive bowel sounds  Extremities:  Trace edema of both legs  Skin: no rash  Neuro: awake, alert, oriented      Diagnostic Data:  Lab: I have personally reviewed pertinent lab results  ,   CBC:       CMP: Lab Results   Component Value Date    SODIUM 139 07/30/2020    K 4 0 07/30/2020     (H) 07/30/2020    CO2 16 (L) 07/30/2020    BUN 15 07/30/2020    CREATININE 1 03 07/30/2020    CALCIUM 8 2 (L) 07/30/2020    EGFR 70 07/30/2020   ,   PT/INR: No results found for: PT, INR,   Magnesium: No components found for: MAG,  Phosphorous: No results found for: PHOS    Microbiology:  @LABRCNTIP,(urinecx:7)@        ARIADNA Haskins    Portions of the record may have been created with voice recognition software  Occasional wrong word or "sound a like" substitutions may have occurred due to the inherent limitations of voice recognition software   Read the chart carefully and recognize, using context, where substitutions have occurred

## 2020-07-30 NOTE — ASSESSMENT & PLAN NOTE
Stable at this time and not in exacerbation  Continue Topamax but I will taper her down to 50 mg p o  B i d  Is on had a discussion with the patient as she does have metabolic acidosis and hypokalemia which Topamax can attribute  Patient is in agreement to taper down as she discussed with her previous neurology she cannot be taken off it cold turkey secondary to having rebound migraines previously    and Elavil, I did do neurology referral

## 2020-07-30 NOTE — PLAN OF CARE
Problem: GASTROINTESTINAL - ADULT  Goal: Minimal or absence of nausea and/or vomiting  Description  INTERVENTIONS:  - Administer IV fluids if ordered to ensure adequate hydration  - Maintain NPO status until nausea and vomiting are resolved  - Nasogastric tube if ordered  - Administer ordered antiemetic medications as needed  - Provide nonpharmacologic comfort measures as appropriate  - Advance diet as tolerated, if ordered  - Consider nutrition services referral to assist patient with adequate nutrition and appropriate food choices  Outcome: Progressing  Goal: Maintains or returns to baseline bowel function  Description  INTERVENTIONS:  - Assess bowel function  - Encourage oral fluids to ensure adequate hydration  - Administer IV fluids if ordered to ensure adequate hydration  - Administer ordered medications as needed  - Encourage mobilization and activity  - Consider nutritional services referral to assist patient with adequate nutrition and appropriate food choices  Outcome: Progressing  Goal: Maintains adequate nutritional intake  Description  INTERVENTIONS:  - Monitor percentage of each meal consumed  - Identify factors contributing to decreased intake, treat as appropriate  - Assist with meals as needed  - Monitor I&O, weight, and lab values if indicated  - Obtain nutrition services referral as needed  Outcome: Progressing     Problem: METABOLIC, FLUID AND ELECTROLYTES - ADULT  Goal: Electrolytes maintained within normal limits  Description  INTERVENTIONS:  - Monitor labs and assess patient for signs and symptoms of electrolyte imbalances  - Administer electrolyte replacement as ordered  - Monitor response to electrolyte replacements, including repeat lab results as appropriate  - Instruct patient on fluid and nutrition as appropriate  Outcome: Progressing  Goal: Fluid balance maintained  Description  INTERVENTIONS:  - Monitor labs   - Monitor I/O and WT  - Instruct patient on fluid and nutrition as appropriate  - Assess for signs & symptoms of volume excess or deficit  Outcome: Progressing     Problem: MUSCULOSKELETAL - ADULT  Goal: Maintain or return mobility to safest level of function  Description  INTERVENTIONS:  - Assess patient's ability to carry out ADLs; assess patient's baseline for ADL function and identify physical deficits which impact ability to perform ADLs (bathing, care of mouth/teeth, toileting, grooming, dressing, etc )  - Assess/evaluate cause of self-care deficits   - Assess range of motion  - Assess patient's mobility  - Assess patient's need for assistive devices and provide as appropriate  - Encourage maximum independence but intervene and supervise when necessary  - Involve family in performance of ADLs  - Assess for home care needs following discharge   - Consider OT consult to assist with ADL evaluation and planning for discharge  - Provide patient education as appropriate  Outcome: Progressing  Goal: Maintain proper alignment of affected body part  Description  INTERVENTIONS:  - Support, maintain and protect limb and body alignment  - Provide patient/ family with appropriate education  Outcome: Progressing     Problem: PAIN - ADULT  Goal: Verbalizes/displays adequate comfort level or baseline comfort level  Description  Interventions:  - Encourage patient to monitor pain and request assistance  - Assess pain using appropriate pain scale  - Administer analgesics based on type and severity of pain and evaluate response  - Implement non-pharmacological measures as appropriate and evaluate response  - Consider cultural and social influences on pain and pain management  - Notify physician/advanced practitioner if interventions unsuccessful or patient reports new pain  Outcome: Progressing     Problem: SAFETY ADULT  Goal: Patient will remain free of falls  Description  INTERVENTIONS:  - Assess patient frequently for physical needs  -  Identify cognitive and physical deficits and behaviors that affect risk of falls    -  [place] fall precautions as indicated by assessment   - Educate patient/family on patient safety including physical limitations  - Instruct patient to call for assistance with activity based on assessment  - Modify environment to reduce risk of injury  - Consider OT/PT consult to assist with strengthening/mobility  Outcome: Progressing  Goal: Maintain or return to baseline ADL function  Description  INTERVENTIONS:  -  Assess patient's ability to carry out ADLs; assess patient's baseline for ADL function and identify physical deficits which impact ability to perform ADLs (bathing, care of mouth/teeth, toileting, grooming, dressing, etc )  - Assess/evaluate cause of self-care deficits   - Assess range of motion  - Assess patient's mobility; develop plan if impaired  - Assess patient's need for assistive devices and provide as appropriate  - Encourage maximum independence but intervene and supervise when necessary  - Involve family in performance of ADLs  - Assess for home care needs following discharge   - Consider OT consult to assist with ADL evaluation and planning for discharge  - Provide patient education as appropriate  Outcome: Progressing  Goal: Maintain or return mobility status to optimal level  Description  INTERVENTIONS:  - Assess patient's baseline mobility status (ambulation, transfers, stairs, etc )    - Identify cognitive and physical deficits and behaviors that affect mobility  - Identify mobility aids required to assist with transfers and/or ambulation (gait belt, sit-to-stand, lift, walker, cane, etc )  - [place] fall precautions as indicated by assessment  - Record patient progress and toleration of activity level on Mobility SBAR; progress patient to next Phase/Stage  - Instruct patient to call for assistance with activity based on assessment  - Consider rehabilitation consult to assist with strengthening/weightbearing, etc   Outcome: Progressing     Problem: DISCHARGE PLANNING  Goal: Discharge to home or other facility with appropriate resources  Description  INTERVENTIONS:  - Identify barriers to discharge w/patient and caregiver  - Arrange for needed discharge resources and transportation as appropriate  - Identify discharge learning needs (meds, wound care, etc )  - Arrange for interpretive services to assist at discharge as needed  - Refer to Case Management Department for coordinating discharge planning if the patient needs post-hospital services based on physician/advanced practitioner order or complex needs related to functional status, cognitive ability, or social support system  Outcome: Progressing     Problem: Knowledge Deficit  Goal: Patient/family/caregiver demonstrates understanding of disease process, treatment plan, medications, and discharge instructions  Description  Complete learning assessment and assess knowledge base  Interventions:  - Provide teaching at level of understanding  - Provide teaching via preferred learning methods  Outcome: Progressing     Problem: Potential for Falls  Goal: Patient will remain free of falls  Description  INTERVENTIONS:  - Assess patient frequently for physical needs  -  Identify cognitive and physical deficits and behaviors that affect risk of falls    -  [place] fall precautions as indicated by assessment   - Educate patient/family on patient safety including physical limitations  - Instruct patient to call for assistance with activity based on assessment  - Modify environment to reduce risk of injury  - Consider OT/PT consult to assist with strengthening/mobility  Outcome: Progressing

## 2020-07-30 NOTE — PLAN OF CARE
Problem: GASTROINTESTINAL - ADULT  Goal: Minimal or absence of nausea and/or vomiting  Description  INTERVENTIONS:  - Administer IV fluids if ordered to ensure adequate hydration  - Maintain NPO status until nausea and vomiting are resolved  - Nasogastric tube if ordered  - Administer ordered antiemetic medications as needed  - Provide nonpharmacologic comfort measures as appropriate  - Advance diet as tolerated, if ordered  - Consider nutrition services referral to assist patient with adequate nutrition and appropriate food choices  Outcome: Progressing  Goal: Maintains or returns to baseline bowel function  Description  INTERVENTIONS:  - Assess bowel function  - Encourage oral fluids to ensure adequate hydration  - Administer IV fluids if ordered to ensure adequate hydration  - Administer ordered medications as needed  - Encourage mobilization and activity  - Consider nutritional services referral to assist patient with adequate nutrition and appropriate food choices  Outcome: Progressing  Goal: Maintains adequate nutritional intake  Description  INTERVENTIONS:  - Monitor percentage of each meal consumed  - Identify factors contributing to decreased intake, treat as appropriate  - Assist with meals as needed  - Monitor I&O, weight, and lab values if indicated  - Obtain nutrition services referral as needed  Outcome: Progressing     Problem: METABOLIC, FLUID AND ELECTROLYTES - ADULT  Goal: Electrolytes maintained within normal limits  Description  INTERVENTIONS:  - Monitor labs and assess patient for signs and symptoms of electrolyte imbalances  - Administer electrolyte replacement as ordered  - Monitor response to electrolyte replacements, including repeat lab results as appropriate  - Instruct patient on fluid and nutrition as appropriate  Outcome: Progressing  Goal: Fluid balance maintained  Description  INTERVENTIONS:  - Monitor labs   - Monitor I/O and WT  - Instruct patient on fluid and nutrition as appropriate  - Assess for signs & symptoms of volume excess or deficit  Outcome: Progressing     Problem: MUSCULOSKELETAL - ADULT  Goal: Maintain or return mobility to safest level of function  Description  INTERVENTIONS:  - Assess patient's ability to carry out ADLs; assess patient's baseline for ADL function and identify physical deficits which impact ability to perform ADLs (bathing, care of mouth/teeth, toileting, grooming, dressing, etc )  - Assess/evaluate cause of self-care deficits   - Assess range of motion  - Assess patient's mobility  - Assess patient's need for assistive devices and provide as appropriate  - Encourage maximum independence but intervene and supervise when necessary  - Involve family in performance of ADLs  - Assess for home care needs following discharge   - Consider OT consult to assist with ADL evaluation and planning for discharge  - Provide patient education as appropriate  Outcome: Progressing  Goal: Maintain proper alignment of affected body part  Description  INTERVENTIONS:  - Support, maintain and protect limb and body alignment  - Provide patient/ family with appropriate education  Outcome: Progressing     Problem: PAIN - ADULT  Goal: Verbalizes/displays adequate comfort level or baseline comfort level  Description  Interventions:  - Encourage patient to monitor pain and request assistance  - Assess pain using appropriate pain scale  - Administer analgesics based on type and severity of pain and evaluate response  - Implement non-pharmacological measures as appropriate and evaluate response  - Consider cultural and social influences on pain and pain management  - Notify physician/advanced practitioner if interventions unsuccessful or patient reports new pain  Outcome: Progressing     Problem: SAFETY ADULT  Goal: Patient will remain free of falls  Description  INTERVENTIONS:  - Assess patient frequently for physical needs  -  Identify cognitive and physical deficits and behaviors that affect risk of falls    -  [place] fall precautions as indicated by assessment   - Educate patient/family on patient safety including physical limitations  - Instruct patient to call for assistance with activity based on assessment  - Modify environment to reduce risk of injury  - Consider OT/PT consult to assist with strengthening/mobility  Outcome: Progressing  Goal: Maintain or return to baseline ADL function  Description  INTERVENTIONS:  -  Assess patient's ability to carry out ADLs; assess patient's baseline for ADL function and identify physical deficits which impact ability to perform ADLs (bathing, care of mouth/teeth, toileting, grooming, dressing, etc )  - Assess/evaluate cause of self-care deficits   - Assess range of motion  - Assess patient's mobility; develop plan if impaired  - Assess patient's need for assistive devices and provide as appropriate  - Encourage maximum independence but intervene and supervise when necessary  - Involve family in performance of ADLs  - Assess for home care needs following discharge   - Consider OT consult to assist with ADL evaluation and planning for discharge  - Provide patient education as appropriate  Outcome: Progressing  Goal: Maintain or return mobility status to optimal level  Description  INTERVENTIONS:  - Assess patient's baseline mobility status (ambulation, transfers, stairs, etc )    - Identify cognitive and physical deficits and behaviors that affect mobility  - Identify mobility aids required to assist with transfers and/or ambulation (gait belt, sit-to-stand, lift, walker, cane, etc )  - [place] fall precautions as indicated by assessment  - Record patient progress and toleration of activity level on Mobility SBAR; progress patient to next Phase/Stage  - Instruct patient to call for assistance with activity based on assessment  - Consider rehabilitation consult to assist with strengthening/weightbearing, etc   Outcome: Progressing     Problem: DISCHARGE PLANNING  Goal: Discharge to home or other facility with appropriate resources  Description  INTERVENTIONS:  - Identify barriers to discharge w/patient and caregiver  - Arrange for needed discharge resources and transportation as appropriate  - Identify discharge learning needs (meds, wound care, etc )  - Arrange for interpretive services to assist at discharge as needed  - Refer to Case Management Department for coordinating discharge planning if the patient needs post-hospital services based on physician/advanced practitioner order or complex needs related to functional status, cognitive ability, or social support system  Outcome: Progressing     Problem: Knowledge Deficit  Goal: Patient/family/caregiver demonstrates understanding of disease process, treatment plan, medications, and discharge instructions  Description  Complete learning assessment and assess knowledge base  Interventions:  - Provide teaching at level of understanding  - Provide teaching via preferred learning methods  Outcome: Progressing     Problem: Potential for Falls  Goal: Patient will remain free of falls  Description  INTERVENTIONS:  - Assess patient frequently for physical needs  -  Identify cognitive and physical deficits and behaviors that affect risk of falls    -  [place] fall precautions as indicated by assessment   - Educate patient/family on patient safety including physical limitations  - Instruct patient to call for assistance with activity based on assessment  - Modify environment to reduce risk of injury  - Consider OT/PT consult to assist with strengthening/mobility  Outcome: Progressing

## 2020-07-30 NOTE — DISCHARGE SUMMARY
Discharge- Femi Valiente 1985, 38 y o  female MRN: [id number]    Unit/Bed#: -01 Encounter: [id number]    Primary Care Provider: Pao Tate DO   Date and time admitted to hospital: 7/27/2020  5:14 PM        Normal anion gap metabolic acidosis  Assessment & Plan  · She initially had diarrhea but the diarrhea has resolved   · She previously has been on bicarb tablets  She is also on Topamax which could worsen your acidosis which I started tapering her off   Initially could have been suspected the secondary to volume loss lactic acid -> her bicarb fluctuates it was 19 in the morning and started her on bicarb tabs yesterday now 16 I will increase her sodium bicarb 650 mg p o  T i d   Will get a BMP on Monday  GERD (gastroesophageal reflux disease)  Assessment & Plan  Stable at this time  Continue Protonix    Vitamin B12 deficiency  Assessment & Plan  Patient is on vitamin B12 shots at home monthly  Continue the same    Vitamin D deficiency  Assessment & Plan  Patient is on vitamin-D 23198 units twice a week at home  Continue upon discharge    Migraine without aura and without status migrainosus, not intractable  Assessment & Plan  Stable at this time and not in exacerbation  Continue Topamax but I will taper her down to 50 mg p o  B i d  Is on had a discussion with the patient as she does have metabolic acidosis and hypokalemia which Topamax can attribute  Patient is in agreement to taper down as she discussed with her previous neurology she cannot be taken off it cold turkey secondary to having rebound migraines previously  and Elavil, I did do neurology referral    * Hypokalemia  Assessment & Plan  Patient has known history of severe hypokalemia with home infusions of 80 mEq 3 times a week at home in addition to 40 mEq 5 times daily orally at home  Patient is followed outpatient by Nephrology and has an undiagnosed severe hypokalemia    She had a extensive workup done with unknown etiology she was supposed to follow-up at [facility] but they did not take her  Recently had some nausea vomiting and diarrhea due to a dental abscess and her potassium is low at 2 4  Sent as a direct admission from Nephrology to get potassium replacement  She will continue her 3 times a week 80 mEq IV and 40 p o  5 times a day  She was also placed on amiloride  Also I discussed with her the Topamax does have a severe side effect of hypokalemia metabolic acidosis which she has metabolic acidosis now  The they try taking her off before a year ago but she developed rebound migraines is so he was suggested by her previous neurologist to taper down discussed with her I will taper her down to 50 twice a day  And then she needs to follow-up with neurology as an outpatient to try to taper this more   Patient potassium fell again today again in the afternoon to 2 9 I will give her the 80 mEq IV that she gets 3 times a week and also will increase her outpatient potassium to 40 mEq p o  6 times a day  Finally her potassium remains stable at 4 she will be discharged on the renew regimen discussed with Nephrology she get a BMP on Monday follow-up next week with Nephrology          Discharging Physician / Practitioner: Rika Alba MD  PCP: Vidal Forde DO  Admission Date:   Admission Orders (From admission, onward)     Ordered        07/27/20 1813  Inpatient Admission  Once                   Discharge Date: 07/30/20    Resolved Problems  Date Reviewed: 7/30/2020    None          Consultations During Hospital Stay:  · Nephrology    Procedures Performed:   · None    Significant Findings / Test Results:   · Potassium of 2 4    Incidental Findings:   · none    Test Results Pending at Discharge (will require follow up): · None     Outpatient Tests Requested:  · BMP 8/3    Complications:  None    Reason for Admission:  Abnormal labs    Hospital Course:      Donato Burgess is a 38 y o  female patient who originally presented to the hospital on 7/27/2020 due to due to abnormal labs show hypokalemia  She has been having hypokalemia issues for years with unknown etiology she had a Nephrology workup extensive  History without any cause she is actually looking for [facility] to do a 2nd opinion  She is on 3 times a week infusions of IV 80 mEq of potassium and 40 mEq of potassium 5 times a day  Some she has been supplied with multiple IV input p o  Potassium here her regimen has been adjusted to 6 times a day p o  She also found to have a non-anion gap metabolic acidosis of unknown reason previously was on by car tabs I did start her bicarb tabs here in increased to 650 mg t i d  She will have another BMP on August 3rd  Her potassium on discharge was stable as well  She is also on amiloride as well discussed all with Nephrology  Patient does have a history of gastric bypass  She does not have any current GI losses on discharge but she did have some nausea vomiting and diarrhea she recently had a tooth abscess  She will continue following outpatient with Nephrology  Also Topamax can contribute to metabolic acidosis and hypokalemia I started tapering it to 50 mg p o  B i d  she is on it for migraines I did do an ambulatory referral to neurology so they will monitor her and if anything she is able to tolerated continue weaning her off it        Please see above list of diagnoses and related plan for additional information  Condition at Discharge: stable     Discharge Day Visit / Exam:     Subjective:  Patient seen and examined no complaints  Vitals: Blood Pressure: (!) 114/42 (07/30/20 0735)  Pulse: 82 (07/30/20 0735)  Temperature: (!) 97 3 °F (36 3 °C) (07/30/20 0735)  Temp Source: Oral (07/29/20 0323)  Respirations: 17 (07/30/20 0735)  SpO2: 100 % (07/30/20 0735)  Exam:   Physical Exam   Constitutional: She is oriented to person, place, and time  She appears well-developed and well-nourished     HENT:   Head: Normocephalic and atraumatic  Eyes: Pupils are equal, round, and reactive to light  EOM are normal    Neck: Normal range of motion  Cardiovascular: Normal rate, regular rhythm and normal heart sounds  Pulmonary/Chest: Effort normal and breath sounds normal    Abdominal: Soft  Bowel sounds are normal    Musculoskeletal: Normal range of motion  Neurological: She is alert and oriented to person, place, and time  She has normal reflexes  cranial nerve 2-12 are normal   Normal neurological exam   Skin: Skin is warm  Psychiatric: She has a normal mood and affect  Discussion with Family: no    Discharge instructions/Information to patient and family:   See after visit summary for information provided to patient and family  Provisions for Follow-Up Care:  See after visit summary for information related to follow-up care and any pertinent home health orders  Disposition:     Home    For Discharges to [facility] SNF:   · Not Applicable to this Patient - Not Applicable to this Patient    Planned Readmission: no     Discharge Statement:  I spent >35 minutes discharging the patient  This time was spent on the day of discharge  I had direct contact with the patient on the day of discharge  Greater than 50% of the total time was spent examining patient, answering all patient questions, arranging and discussing plan of care with patient as well as directly providing post-discharge instructions  Additional time then spent on discharge activities  Discharge Medications:  See after visit summary for reconciled discharge medications provided to patient and family        ** Please Note: This note has been constructed using a voice recognition system **

## 2020-07-30 NOTE — ASSESSMENT & PLAN NOTE
· She initially had diarrhea but the diarrhea has resolved   · She previously has been on bicarb tablets  She is also on Topamax which could worsen your acidosis which I started tapering her off   Initially could have been suspected the secondary to volume loss lactic acid -> her bicarb fluctuates it was 19 in the morning and started her on bicarb tabs yesterday now 16 I will increase her sodium bicarb 650 mg p o  T i d   Will get a BMP on Monday

## 2020-07-30 NOTE — UTILIZATION REVIEW
Continued Stay Review    Date: 7/30                   Current Patient Class: Inpatient   Current Level of Care: Med/surg    HPI:35 y o  female initially admitted on 7/27    Assessment/Plan:   Potassium level improved  7/29 UPdate:   Diarrhea has resolved  Potassium remains low and fell again  Continue IV potassium  Recheck BMP and Mag in the morning  7/28 Nephrology consult:  Hypokalemia for which she has received IV infusions 2 times a week and po supplements  S/P bariatric surgery  Currently receiving aggressive potassium replacement  Most recent BMP 3 1  Will order additional IV supplement  Trace lower extremity edema  Continue to check BMP  Pertinent Labs/Diagnostic Results:     Results from last 7 days   Lab Units 07/30/20  1314 07/30/20  0505 07/29/20 2054 07/29/20  1336 07/29/20  0548  07/28/20  0403   SODIUM mmol/L 139 141 139 141 142   < > 143   POTASSIUM mmol/L 4 0 3 3* 3 9 2 9* 3 9   < > 3 0*   CHLORIDE mmol/L 111* 112* 110* 110* 112*   < > 111*   CO2 mmol/L 16* 19* 17* 19* 20*   < > 19*   ANION GAP mmol/L 12 10 12 12 10   < > 13   BUN mg/dL 15 16 20 12 14   < > 13   CREATININE mg/dL 1 03 0 87 1 02 1 04 1 05   < > 1 06   EGFR ml/min/1 73sq m 70 86 71 69 68   < > 68   CALCIUM mg/dL 8 2* 8 2* 7 9* 7 5* 7 8*   < > 8 2*   MAGNESIUM mg/dL  --  2 3  --   --   --   --  2 3    < > = values in this interval not displayed       Results from last 7 days   Lab Units 07/30/20  1314 07/30/20  0505 07/29/20 2054 07/29/20  1336 07/29/20  0548 07/29/20  0034 07/28/20  1356 07/28/20  0825 07/28/20  0403 07/27/20  0924   GLUCOSE RANDOM mg/dL 128 82 93 96 82 93 108 74 81 85       Results from last 7 days   Lab Units 07/29/20  1336   LACTIC ACID mmol/L 0 9     Vital Signs:   07:35:52  97 3 °F (36 3 °C)Abnormal   82  17  114/42Abnormal   66  100 %  --   07/30/20 0720  --  --  --  --  --  100 %  None (Room air)   07/29/20 15:07:35  98 °F (36 7 °C)  86  19  99/63  75  100 %  --   07/29/20 07:22:01  98 1 °F (36 7 °C) 75  16  102/62  75  100 %  --   07/29/20 03:23:15  97 8 °F (36 6 °C)  67  14  98/63  75  100 %  --   07/28/20 23:46:03  97 9 °F (36 6 °C)  72  18  93/63  73  100 %  --   07/28/20 19:22:28  --  94  --  108/68  81  100 %  --   07/28/20 15:26:20  98 5 °F (36 9 °C)  80  12  110/68  82  98 %  --   07/28/20 11:16:51  98 5 °F (36 9 °C)  90  19  116/68  84  100 %  --   07/28/20 0745  --  --  --  --  --  --  None (Room air)   07/28/20 07:28:20  98 2 °F (36 8 °C)  77  19  115/68  84  100 %  --   07/28/20 02:57:17  97 5 °F (36 4 °C)  68  18  105/60  75  100 %  --   07/28/20 00:02:02  97 8 °F (36 6 °C)  66  16  104/60             Medications:   Scheduled Medications:    Medications:  AMILoride 5 mg Oral Daily   amitriptyline 50 mg Oral HS   b complex-vitamin C-folic acid 1 capsule Oral Daily With Dinner   ferrous sulfate 325 mg Oral Every Other Day   pantoprazole 40 mg Oral Early Morning   potassium chloride 40 mEq Oral 6x Daily   potassium chloride 40 mEq Intravenous BID   sodium bicarbonate 650 mg Oral BID after meals   sucralfate 1 g Oral BID AC   thiamine 100 mg Oral Daily   topiramate 50 mg Oral BID   zinc sulfate 220 mg Oral Daily     Continuous IV Infusions:     PRN Meds:    acetaminophen 650 mg Oral Q6H PRN   calcium carbonate 1,000 mg Oral Daily PRN   ondansetron 4 mg Intravenous Q6H PRN       Discharge Plan: 7/30    Network Utilization Review Department  [email or web address]  ATTENTION: Please call with any questions or concerns to [phone] and carefully listen to the prompts so that you are directed to the right person  All voicemails are confidential   Harlo Large all requests for admission clinical reviews, approved or denied determinations and any other requests to dedicated fax number below belonging to the campus where the patient is receiving treatment   List of dedicated fax numbers for the Facilities:  FACILITY NAME UR FAX NUMBER   ADMISSION DENIALS (Administrative/Medical Necessity) [facility] (Maternity/NICU/Pediatrics) [phone]   [facility] [phone]   [facility] [phone]   [facility] Form [phone]   Beckie Gaston [address] [phone]   [facility]  [phone]   [address] [facility]  [address] [facility] [address] [place] [address] [phone]

## 2020-08-03 ENCOUNTER — APPOINTMENT (OUTPATIENT)
Dept: LAB | Facility: HOSPITAL | Age: 35
End: 2020-08-03
Attending: INTERNAL MEDICINE
Payer: COMMERCIAL

## 2020-08-03 NOTE — UTILIZATION REVIEW
Notification of Discharge  This is a Notification of Discharge from our facility [address]  Please be advised that this patient has been discharge from our facility  Below you will find the admission and discharge date and time including the patients disposition  PRESENTATION DATE: 7/27/2020  5:14 PM  OBS ADMISSION DATE:   IP ADMISSION DATE: 7/27/20 1714   DISCHARGE DATE: 7/30/2020  2:34 PM  DISPOSITION: Home/Self Care Home/Self Care   Admission Orders listed below:  Admission Orders (From admission, onward)     Ordered        07/27/20 1813  Inpatient Admission  Once                   Please contact the UR Department if additional information is required to close this patient's authorization/case  [address] [facility] Utilization Review Department  Main: [phone] x carefully listen to the prompts  All voicemails are confidential   [email or web address]  Send all requests for admission clinical reviews, approved or denied determinations and any other requests to dedicated fax number below belonging to the campus where the patient is receiving treatment   List of dedicated fax numbers:  [address] DENIALS (Administrative/Medical Necessity) [phone]   [address] (Maternity/NICU/Pediatrics) [phone]   Wojciech Chapman [phone]   [facility] [phone]   [facility] [phone]   [facility] [phone]   [facility]uela [phone]   [address] [facility]  [address] [facility] [address] [facility] [phone]

## 2020-08-10 ENCOUNTER — APPOINTMENT (OUTPATIENT)
Dept: LAB | Facility: HOSPITAL | Age: 35
End: 2020-08-10
Attending: FAMILY MEDICINE
Payer: COMMERCIAL

## 2020-08-10 ENCOUNTER — TRANSCRIBE ORDERS (OUTPATIENT)
Dept: ADMINISTRATIVE | Facility: HOSPITAL | Age: 35
End: 2020-08-10

## 2020-08-10 ENCOUNTER — TELEPHONE (OUTPATIENT)
Dept: NEPHROLOGY | Facility: CLINIC | Age: 35
End: 2020-08-10

## 2020-08-10 DIAGNOSIS — R19.7 DIARRHEA, UNSPECIFIED TYPE: ICD-10-CM

## 2020-08-10 DIAGNOSIS — E87.6 HYPOKALEMIA: Primary | ICD-10-CM

## 2020-08-10 DIAGNOSIS — K21.9 GASTROESOPHAGEAL REFLUX DISEASE WITHOUT ESOPHAGITIS: ICD-10-CM

## 2020-08-10 DIAGNOSIS — E87.6 HYPOKALEMIA: ICD-10-CM

## 2020-08-10 PROCEDURE — 82941 ASSAY OF GASTRIN: CPT

## 2020-08-10 PROCEDURE — 84586 ASSAY OF VIP: CPT

## 2020-08-10 PROCEDURE — 84260 ASSAY OF SEROTONIN: CPT

## 2020-08-10 RX ORDER — SUCRALFATE 1 G/1
1 TABLET ORAL DAILY
Qty: 90 TABLET | Refills: 1 | Status: SHIPPED | OUTPATIENT
Start: 2020-08-10 | End: 2020-10-01 | Stop reason: HOSPADM

## 2020-08-10 RX ORDER — PANTOPRAZOLE SODIUM 40 MG/1
40 TABLET, DELAYED RELEASE ORAL DAILY
Qty: 90 TABLET | Refills: 1 | Status: SHIPPED | OUTPATIENT
Start: 2020-08-10 | End: 2021-03-24 | Stop reason: ALTCHOICE

## 2020-08-10 NOTE — TELEPHONE ENCOUNTER
Spoke with patient she states that she has been feeling a little nausea but denies any vomiting or diarrhea  She states that she feels like the heat has been bothering her but she has been staying hydrated

## 2020-08-10 NOTE — TELEPHONE ENCOUNTER
Ok,Please make sure that she is not just pushing fluid but specifically fluid with salt  For example Gatorade or if she is drinking regular water, to have something salty along with it such as crackers or cold cuts /pickles

## 2020-08-10 NOTE — TELEPHONE ENCOUNTER
----- Message from Eboni Chavez DO sent at 8/10/2020  9:53 AM EDT -----  Potassium down to 3 and her kidney function is worse, please ask if there is something else going on (n-v-d) that may be causing these lab abnormalities

## 2020-08-10 NOTE — TELEPHONE ENCOUNTER
----- Message from Myriam Miracle, DO sent at 8/10/2020  9:53 AM EDT -----  Potassium down to 3 and her kidney function is worse, please ask if there is something else going on (n-v-d) that may be causing these lab abnormalities

## 2020-08-12 LAB — GASTRIN SERPL-MCNC: 14 PG/ML (ref 0–115)

## 2020-08-13 LAB — SEROTONIN PLAS-MCNC: 154 NG/ML (ref 0–420)

## 2020-08-14 LAB — VIP SERPL-MCNC: <16.8 PG/ML (ref 0–58.8)

## 2020-08-17 ENCOUNTER — HOSPITAL ENCOUNTER (INPATIENT)
Facility: HOSPITAL | Age: 35
LOS: 4 days | Discharge: HOME | DRG: 389 | End: 2020-08-21
Attending: INTERNAL MEDICINE | Admitting: FAMILY MEDICINE
Payer: COMMERCIAL

## 2020-08-17 ENCOUNTER — APPOINTMENT (OUTPATIENT)
Dept: LAB | Facility: HOSPITAL | Age: 35
DRG: 389 | End: 2020-08-17
Attending: INTERNAL MEDICINE
Payer: COMMERCIAL

## 2020-08-17 ENCOUNTER — APPOINTMENT (INPATIENT)
Dept: CT IMAGING | Facility: HOSPITAL | Age: 35
DRG: 389 | End: 2020-08-17
Payer: COMMERCIAL

## 2020-08-17 ENCOUNTER — TELEPHONE (OUTPATIENT)
Dept: NEPHROLOGY | Facility: CLINIC | Age: 35
End: 2020-08-17

## 2020-08-17 DIAGNOSIS — E87.6 HYPOKALEMIA: Primary | ICD-10-CM

## 2020-08-17 DIAGNOSIS — R10.32 LEFT LOWER QUADRANT ABDOMINAL PAIN: ICD-10-CM

## 2020-08-17 DIAGNOSIS — G43.009 MIGRAINE WITHOUT AURA AND WITHOUT STATUS MIGRAINOSUS, NOT INTRACTABLE: ICD-10-CM

## 2020-08-17 DIAGNOSIS — R93.3 ABNORMAL FINDING ON GI TRACT IMAGING: Primary | ICD-10-CM

## 2020-08-17 DIAGNOSIS — E87.6 HYPOKALEMIA: ICD-10-CM

## 2020-08-17 PROBLEM — R10.9 LEFT FLANK PAIN: Status: ACTIVE | Noted: 2020-08-17

## 2020-08-17 LAB
ANION GAP SERPL CALCULATED.3IONS-SCNC: 15 MMOL/L (ref 4–13)
BASOPHILS # BLD AUTO: 0.05 THOUSANDS/ΜL (ref 0–0.1)
BASOPHILS NFR BLD AUTO: 1 % (ref 0–1)
BILIRUB UR QL STRIP: ABNORMAL
BUN SERPL-MCNC: 15 MG/DL (ref 5–25)
CALCIUM SERPL-MCNC: 8.6 MG/DL (ref 8.3–10.1)
CHLORIDE SERPL-SCNC: 98 MMOL/L (ref 100–108)
CLARITY UR: CLEAR
CO2 SERPL-SCNC: 21 MMOL/L (ref 21–32)
COLOR UR: YELLOW
CREAT SERPL-MCNC: 1.31 MG/DL (ref 0.6–1.3)
EOSINOPHIL # BLD AUTO: 0.06 THOUSAND/ΜL (ref 0–0.61)
EOSINOPHIL NFR BLD AUTO: 1 % (ref 0–6)
ERYTHROCYTE [DISTWIDTH] IN BLOOD BY AUTOMATED COUNT: 18.1 % (ref 11.6–15.1)
GFR SERPL CREATININE-BSD FRML MDRD: 52 ML/MIN/1.73SQ M
GLUCOSE SERPL-MCNC: 98 MG/DL (ref 65–140)
GLUCOSE UR STRIP-MCNC: NEGATIVE MG/DL
HCT VFR BLD AUTO: 42.1 % (ref 34.8–46.1)
HGB BLD-MCNC: 13.5 G/DL (ref 11.5–15.4)
HGB UR QL STRIP.AUTO: NEGATIVE
IMM GRANULOCYTES # BLD AUTO: 0.02 THOUSAND/UL (ref 0–0.2)
IMM GRANULOCYTES NFR BLD AUTO: 0 % (ref 0–2)
KETONES UR STRIP-MCNC: ABNORMAL MG/DL
LEUKOCYTE ESTERASE UR QL STRIP: NEGATIVE
LIPASE SERPL-CCNC: 180 U/L (ref 73–393)
LYMPHOCYTES # BLD AUTO: 1.24 THOUSANDS/ΜL (ref 0.6–4.47)
LYMPHOCYTES NFR BLD AUTO: 15 % (ref 14–44)
MAGNESIUM SERPL-MCNC: 2.5 MG/DL (ref 1.6–2.6)
MCH RBC QN AUTO: 27 PG (ref 26.8–34.3)
MCHC RBC AUTO-ENTMCNC: 32.1 G/DL (ref 31.4–37.4)
MCV RBC AUTO: 84 FL (ref 82–98)
MONOCYTES # BLD AUTO: 0.41 THOUSAND/ΜL (ref 0.17–1.22)
MONOCYTES NFR BLD AUTO: 5 % (ref 4–12)
NEUTROPHILS # BLD AUTO: 6.32 THOUSANDS/ΜL (ref 1.85–7.62)
NEUTS SEG NFR BLD AUTO: 78 % (ref 43–75)
NITRITE UR QL STRIP: NEGATIVE
NRBC BLD AUTO-RTO: 0 /100 WBCS
PH UR STRIP.AUTO: 6.5 [PH]
PLATELET # BLD AUTO: 366 THOUSANDS/UL (ref 149–390)
PMV BLD AUTO: 11.1 FL (ref 8.9–12.7)
POTASSIUM SERPL-SCNC: 2.4 MMOL/L (ref 3.5–5.3)
PROT UR STRIP-MCNC: NEGATIVE MG/DL
RBC # BLD AUTO: 5 MILLION/UL (ref 3.81–5.12)
SODIUM SERPL-SCNC: 134 MMOL/L (ref 136–145)
SP GR UR STRIP.AUTO: 1.01 (ref 1–1.03)
UROBILINOGEN UR QL STRIP.AUTO: 0.2 E.U./DL
WBC # BLD AUTO: 8.1 THOUSAND/UL (ref 4.31–10.16)

## 2020-08-17 PROCEDURE — 81003 URINALYSIS AUTO W/O SCOPE: CPT | Performed by: FAMILY MEDICINE

## 2020-08-17 PROCEDURE — 99223 1ST HOSP IP/OBS HIGH 75: CPT | Performed by: FAMILY MEDICINE

## 2020-08-17 PROCEDURE — 83735 ASSAY OF MAGNESIUM: CPT | Performed by: FAMILY MEDICINE

## 2020-08-17 PROCEDURE — 93005 ELECTROCARDIOGRAM TRACING: CPT

## 2020-08-17 PROCEDURE — 74176 CT ABD & PELVIS W/O CONTRAST: CPT

## 2020-08-17 PROCEDURE — G1004 CDSM NDSC: HCPCS

## 2020-08-17 PROCEDURE — 83690 ASSAY OF LIPASE: CPT | Performed by: FAMILY MEDICINE

## 2020-08-17 PROCEDURE — 85025 COMPLETE CBC W/AUTO DIFF WBC: CPT | Performed by: FAMILY MEDICINE

## 2020-08-17 PROCEDURE — 80048 BASIC METABOLIC PNL TOTAL CA: CPT | Performed by: FAMILY MEDICINE

## 2020-08-17 RX ORDER — THIAMINE MONONITRATE (VIT B1) 100 MG
100 TABLET ORAL DAILY
Status: DISCONTINUED | OUTPATIENT
Start: 2020-08-17 | End: 2020-08-21 | Stop reason: HOSPADM

## 2020-08-17 RX ORDER — PANTOPRAZOLE SODIUM 40 MG/1
40 TABLET, DELAYED RELEASE ORAL DAILY
Status: DISCONTINUED | OUTPATIENT
Start: 2020-08-18 | End: 2020-08-21 | Stop reason: HOSPADM

## 2020-08-17 RX ORDER — ZINC SULFATE 50(220)MG
220 CAPSULE ORAL DAILY
Status: DISCONTINUED | OUTPATIENT
Start: 2020-08-17 | End: 2020-08-21 | Stop reason: HOSPADM

## 2020-08-17 RX ORDER — POTASSIUM CHLORIDE 14.9 MG/ML
40 INJECTION INTRAVENOUS ONCE
Status: COMPLETED | OUTPATIENT
Start: 2020-08-17 | End: 2020-08-18

## 2020-08-17 RX ORDER — ACETAMINOPHEN 325 MG/1
650 TABLET ORAL EVERY 6 HOURS PRN
Status: DISCONTINUED | OUTPATIENT
Start: 2020-08-17 | End: 2020-08-21 | Stop reason: HOSPADM

## 2020-08-17 RX ORDER — AMITRIPTYLINE HYDROCHLORIDE 25 MG/1
50 TABLET, FILM COATED ORAL
Status: DISCONTINUED | OUTPATIENT
Start: 2020-08-17 | End: 2020-08-21 | Stop reason: HOSPADM

## 2020-08-17 RX ORDER — SUCRALFATE 1 G/1
1 TABLET ORAL DAILY
Status: DISCONTINUED | OUTPATIENT
Start: 2020-08-17 | End: 2020-08-21 | Stop reason: HOSPADM

## 2020-08-17 RX ORDER — POTASSIUM CHLORIDE 20 MEQ/1
60 TABLET, EXTENDED RELEASE ORAL ONCE
Status: DISCONTINUED | OUTPATIENT
Start: 2020-08-17 | End: 2020-08-17

## 2020-08-17 RX ORDER — SODIUM BICARBONATE 650 MG/1
650 TABLET ORAL
Status: DISCONTINUED | OUTPATIENT
Start: 2020-08-17 | End: 2020-08-18

## 2020-08-17 RX ORDER — MORPHINE SULFATE 4 MG/ML
4 INJECTION, SOLUTION INTRAMUSCULAR; INTRAVENOUS ONCE
Status: COMPLETED | OUTPATIENT
Start: 2020-08-17 | End: 2020-08-17

## 2020-08-17 RX ORDER — POTASSIUM CHLORIDE 20MEQ/15ML
40 LIQUID (ML) ORAL
Status: DISCONTINUED | OUTPATIENT
Start: 2020-08-17 | End: 2020-08-18

## 2020-08-17 RX ORDER — POTASSIUM CHLORIDE 14.9 MG/ML
20 INJECTION INTRAVENOUS ONCE
Status: COMPLETED | OUTPATIENT
Start: 2020-08-17 | End: 2020-08-17

## 2020-08-17 RX ORDER — FERROUS SULFATE 325(65) MG
325 TABLET ORAL EVERY OTHER DAY
Status: DISCONTINUED | OUTPATIENT
Start: 2020-08-19 | End: 2020-08-21 | Stop reason: HOSPADM

## 2020-08-17 RX ORDER — SIMETHICONE 80 MG
80 TABLET,CHEWABLE ORAL ONCE
Status: COMPLETED | OUTPATIENT
Start: 2020-08-17 | End: 2020-08-17

## 2020-08-17 RX ORDER — SODIUM CHLORIDE, SODIUM GLUCONATE, SODIUM ACETATE, POTASSIUM CHLORIDE, MAGNESIUM CHLORIDE, SODIUM PHOSPHATE, DIBASIC, AND POTASSIUM PHOSPHATE .53; .5; .37; .037; .03; .012; .00082 G/100ML; G/100ML; G/100ML; G/100ML; G/100ML; G/100ML; G/100ML
75 INJECTION, SOLUTION INTRAVENOUS CONTINUOUS
Status: DISCONTINUED | OUTPATIENT
Start: 2020-08-17 | End: 2020-08-18

## 2020-08-17 RX ORDER — ONDANSETRON 2 MG/ML
4 INJECTION INTRAMUSCULAR; INTRAVENOUS EVERY 6 HOURS PRN
Status: DISCONTINUED | OUTPATIENT
Start: 2020-08-17 | End: 2020-08-21 | Stop reason: HOSPADM

## 2020-08-17 RX ORDER — TOPIRAMATE 25 MG/1
25 TABLET ORAL 2 TIMES DAILY
Status: DISCONTINUED | OUTPATIENT
Start: 2020-08-17 | End: 2020-08-21 | Stop reason: HOSPADM

## 2020-08-17 RX ORDER — CHOLECALCIFEROL (VITAMIN D3) 10 MCG
1 TABLET ORAL
Status: DISCONTINUED | OUTPATIENT
Start: 2020-08-17 | End: 2020-08-21 | Stop reason: HOSPADM

## 2020-08-17 RX ORDER — AMILORIDE HYDROCHLORIDE 5 MG/1
5 TABLET ORAL DAILY
Status: DISCONTINUED | OUTPATIENT
Start: 2020-08-18 | End: 2020-08-21 | Stop reason: HOSPADM

## 2020-08-17 RX ORDER — POTASSIUM CHLORIDE 20 MEQ/1
40 TABLET, EXTENDED RELEASE ORAL ONCE
Status: COMPLETED | OUTPATIENT
Start: 2020-08-17 | End: 2020-08-17

## 2020-08-17 RX ORDER — POTASSIUM CHLORIDE 29.8 MG/ML
40 INJECTION INTRAVENOUS ONCE
Status: DISCONTINUED | OUTPATIENT
Start: 2020-08-17 | End: 2020-08-17

## 2020-08-17 RX ADMIN — Medication 1 CAPSULE: at 16:58

## 2020-08-17 RX ADMIN — SODIUM CHLORIDE, SODIUM GLUCONATE, SODIUM ACETATE, POTASSIUM CHLORIDE, MAGNESIUM CHLORIDE, SODIUM PHOSPHATE, DIBASIC, AND POTASSIUM PHOSPHATE 75 ML/HR: .53; .5; .37; .037; .03; .012; .00082 INJECTION, SOLUTION INTRAVENOUS at 17:01

## 2020-08-17 RX ADMIN — SIMETHICONE CHEW TAB 80 MG 80 MG: 80 TABLET ORAL at 16:57

## 2020-08-17 RX ADMIN — MORPHINE SULFATE 4 MG: 4 INJECTION INTRAVENOUS at 13:52

## 2020-08-17 RX ADMIN — POTASSIUM CHLORIDE 40 MEQ: 20 SOLUTION ORAL at 20:26

## 2020-08-17 RX ADMIN — TOPIRAMATE 25 MG: 25 TABLET, FILM COATED ORAL at 17:00

## 2020-08-17 RX ADMIN — THIAMINE HCL TAB 100 MG 100 MG: 100 TAB at 14:14

## 2020-08-17 RX ADMIN — POTASSIUM CHLORIDE 40 MEQ: 20 SOLUTION ORAL at 22:09

## 2020-08-17 RX ADMIN — AMITRIPTYLINE HYDROCHLORIDE 50 MG: 25 TABLET, FILM COATED ORAL at 22:09

## 2020-08-17 RX ADMIN — POTASSIUM CHLORIDE 20 MEQ: 14.9 INJECTION, SOLUTION INTRAVENOUS at 14:01

## 2020-08-17 RX ADMIN — POTASSIUM CHLORIDE 40 MEQ: 20 SOLUTION ORAL at 14:13

## 2020-08-17 RX ADMIN — POTASSIUM CHLORIDE 40 MEQ: 1500 TABLET, EXTENDED RELEASE ORAL at 14:04

## 2020-08-17 RX ADMIN — ZINC SULFATE 220 MG (50 MG) CAPSULE 220 MG: CAPSULE at 14:14

## 2020-08-17 RX ADMIN — MORPHINE SULFATE 2 MG: 2 INJECTION, SOLUTION INTRAMUSCULAR; INTRAVENOUS at 22:46

## 2020-08-17 RX ADMIN — SODIUM BICARBONATE 650 MG TABLET 650 MG: at 16:57

## 2020-08-17 RX ADMIN — SUCRALFATE 1 G: 1 TABLET ORAL at 14:13

## 2020-08-17 RX ADMIN — POTASSIUM CHLORIDE 40 MEQ: 14.9 INJECTION, SOLUTION INTRAVENOUS at 20:38

## 2020-08-17 RX ADMIN — MORPHINE SULFATE 2 MG: 2 INJECTION, SOLUTION INTRAMUSCULAR; INTRAVENOUS at 18:39

## 2020-08-17 RX ADMIN — POTASSIUM CHLORIDE 40 MEQ: 20 SOLUTION ORAL at 16:57

## 2020-08-17 NOTE — PLAN OF CARE
Problem: Potential for Falls  Goal: Patient will remain free of falls  Description: INTERVENTIONS:  - Assess patient frequently for physical needs  -  Identify cognitive and physical deficits and behaviors that affect risk of falls    -  [place] fall precautions as indicated by assessment   - Educate patient/family on patient safety including physical limitations  - Instruct patient to call for assistance with activity based on assessment  - Modify environment to reduce risk of injury  - Consider OT/PT consult to assist with strengthening/mobility  Outcome: Progressing     Problem: PAIN - ADULT  Goal: Verbalizes/displays adequate comfort level or baseline comfort level  Description: Interventions:  - Encourage patient to monitor pain and request assistance  - Assess pain using appropriate pain scale  - Administer analgesics based on type and severity of pain and evaluate response  - Implement non-pharmacological measures as appropriate and evaluate response  - Consider cultural and social influences on pain and pain management  - Notify physician/advanced practitioner if interventions unsuccessful or patient reports new pain  Outcome: Progressing     Problem: INFECTION - ADULT  Goal: Absence or prevention of progression during hospitalization  Description: INTERVENTIONS:  - Assess and monitor for signs and symptoms of infection  - Monitor lab/diagnostic results  - Monitor all insertion sites, i e  indwelling lines, tubes, and drains  - Monitor endotracheal if appropriate and nasal secretions for changes in amount and color  - [place] appropriate cooling/warming therapies per order  - Administer medications as ordered  - Instruct and encourage patient and family to use good hand hygiene technique  - Identify and instruct in appropriate isolation precautions for identified infection/condition  Outcome: Progressing     Problem: SAFETY ADULT  Goal: Patient will remain free of falls  Description: INTERVENTIONS:  - Assess patient frequently for physical needs  -  Identify cognitive and physical deficits and behaviors that affect risk of falls    -  [place] fall precautions as indicated by assessment   - Educate patient/family on patient safety including physical limitations  - Instruct patient to call for assistance with activity based on assessment  - Modify environment to reduce risk of injury  - Consider OT/PT consult to assist with strengthening/mobility  Outcome: Progressing  Goal: Maintain or return to baseline ADL function  Description: INTERVENTIONS:  -  Assess patient's ability to carry out ADLs; assess patient's baseline for ADL function and identify physical deficits which impact ability to perform ADLs (bathing, care of mouth/teeth, toileting, grooming, dressing, etc )  - Assess/evaluate cause of self-care deficits   - Assess range of motion  - Assess patient's mobility; develop plan if impaired  - Assess patient's need for assistive devices and provide as appropriate  - Encourage maximum independence but intervene and supervise when necessary  - Involve family in performance of ADLs  - Assess for home care needs following discharge   - Consider OT consult to assist with ADL evaluation and planning for discharge  - Provide patient education as appropriate  Outcome: Progressing     Problem: DISCHARGE PLANNING  Goal: Discharge to home or other facility with appropriate resources  Description: INTERVENTIONS:  - Identify barriers to discharge w/patient and caregiver  - Arrange for needed discharge resources and transportation as appropriate  - Identify discharge learning needs (meds, wound care, etc )  - Arrange for interpretive services to assist at discharge as needed  - Refer to Case Management Department for coordinating discharge planning if the patient needs post-hospital services based on physician/advanced practitioner order or complex needs related to functional status, cognitive ability, or social support system  Outcome: Progressing     Problem: Knowledge Deficit  Goal: Patient/family/caregiver demonstrates understanding of disease process, treatment plan, medications, and discharge instructions  Description: Complete learning assessment and assess knowledge base    Interventions:  - Provide teaching at level of understanding  - Provide teaching via preferred learning methods  Outcome: Progressing     Problem: CARDIOVASCULAR - ADULT  Goal: Maintains optimal cardiac output and hemodynamic stability  Description: INTERVENTIONS:  - Monitor I/O, vital signs and rhythm  - Monitor for S/S and trends of decreased cardiac output  - Administer and titrate ordered vasoactive medications to optimize hemodynamic stability  - Assess quality of pulses, skin color and temperature  - Assess for signs of decreased coronary artery perfusion  - Instruct patient to report change in severity of symptoms  Outcome: Progressing  Goal: Absence of cardiac dysrhythmias or at baseline rhythm  Description: INTERVENTIONS:  - Continuous cardiac monitoring, vital signs, obtain 12 lead EKG if ordered  - Administer antiarrhythmic and heart rate control medications as ordered  - Monitor electrolytes and administer replacement therapy as ordered  Outcome: Progressing     Problem: METABOLIC, FLUID AND ELECTROLYTES - ADULT  Goal: Electrolytes maintained within normal limits  Description: INTERVENTIONS:  - Monitor labs and assess patient for signs and symptoms of electrolyte imbalances  - Administer electrolyte replacement as ordered  - Monitor response to electrolyte replacements, including repeat lab results as appropriate  - Instruct patient on fluid and nutrition as appropriate  Outcome: Progressing  Goal: Fluid balance maintained  Description: INTERVENTIONS:  - Monitor labs   - Monitor I/O and WT  - Instruct patient on fluid and nutrition as appropriate  - Assess for signs & symptoms of volume excess or deficit  Outcome: Progressing

## 2020-08-17 NOTE — ASSESSMENT & PLAN NOTE
· Suspect kidney stone  · Will obtain a UA  · Morphine p r n    · CT renal stone study ordered  · Will check lipase as well, as radiating towards luq as well

## 2020-08-17 NOTE — ASSESSMENT & PLAN NOTE
· Recurrent follows outpatient with Nephrology has been compliant with her KCl 6 times a day and 3 times effusions of 80 mEq  She had an extensive workup done by nephron it is not renal loss  She previously was evaluated by bariatric surgery the declined to reverse her gastric bypass  · Symptomatic  · Will resume her KCl 40 mEq p o  Q 6 hours in addition will give her 40 mEq x1 now which will make it an 80 mEq dose  Then will give her 20 mEq IV x1  Place on isolate for 8 hours  · Get a magnesium  · She has a 2nd opinion scheduled in October in [facility]  · I did a taper of her Topamax last time she is currently on 50 mg p o  Q 12 hours and she has been tolerating she has not been able to see her neurologist so I will taper further to 25 mg p o  Q 12 hours

## 2020-08-17 NOTE — ASSESSMENT & PLAN NOTE
· Resolved bicarb is 25, will decrease bicarb tabs to twice a day and monitor if remains stable may consider decreased more   She had a previous history of acidosis and used to BM bicarb before it was restarted on last admission  · Topamax as this is a side effect as well has been tapered

## 2020-08-17 NOTE — TELEPHONE ENCOUNTER
Patient was discussed with Dr Arline Baumgarten who has accepted her as a direct admission into [facility]  We are awating ADT to call and then go forward with admission

## 2020-08-17 NOTE — H&P
H&P- Dannielle Anderson 1985, 38 y o  female MRN: [id number]    Unit/Bed#: -01 Encounter: [id number]    Primary Care Provider: Nida Griffin DO   Date and time admitted to hospital: 8/17/2020 12:37 PM        Left flank pain  Assessment & Plan  · Suspect kidney stone  · Will obtain a UA  · Morphine p r n  · CT renal stone study ordered  · Will check lipase as well, as radiating towards luq as well     Normal anion gap metabolic acidosis  Assessment & Plan  · Resolved bicarb is 25, will decrease bicarb tabs to twice a day and monitor if remains stable may consider decreased more   She had a previous history of acidosis and used to BM bicarb before it was restarted on last admission  · Topamax as this is a side effect as well has been tapered  GERD (gastroesophageal reflux disease)  Assessment & Plan  · Continue PPI    Vitamin B12 deficiency  Assessment & Plan  · She is on vitamin B12 injection monthly    Chronic anemia  Assessment & Plan  · Will check a CBC  · Continue iron    Migraine without aura and without status migrainosus, not intractable  Assessment & Plan  · She still has not seen her neurologist just yet  Will last admission I taper Topamax 50 mg p o  B i d  And she has been tolerating it continue tapering it further to 25 mg p o  B i d  As she has hypokalemia with no suspected nephrolithiasis and also previous non-anion gap metabolic acidosis which are all side effects of Topamax as well  Previously she was unable to be taken off cold turkey secondary to having rebound migraine  · Continue Elavil    History of gastric bypass  Assessment & Plan  · Resume her vitamin supplementation    * Hypokalemia  Assessment & Plan  · Recurrent follows outpatient with Nephrology has been compliant with her KCl 6 times a day and 3 times effusions of 80 mEq  She had an extensive workup done by nephron it is not renal loss    She previously was evaluated by bariatric surgery the declined to reverse her gastric bypass  · Symptomatic  · Will resume her KCl 40 mEq p o  Q 6 hours in addition will give her 40 mEq x1 now which will make it an 80 mEq dose  Then will give her 20 mEq IV x1  Place on isolate for 8 hours  · Get a magnesium  · She has a 2nd opinion scheduled in October in [facility]  · I did a taper of her Topamax last time she is currently on 50 mg p o  Q 12 hours and she has been tolerating she has not been able to see her neurologist so I will taper further to 25 mg p o  Q 12 hours  VTE Prophylaxis: Pharmacologic VTE Prophylaxis contraindicated due to Encourage ambulation  / sequential compression device   Code Status:  Full code  POLST: There is no POLST form on file for this patient (pre-hospital)  Discussion with family:  No    Anticipated Length of Stay:  Patient will be admitted on an Inpatient basis with an anticipated length of stay of  > 2 midnights  Justification for Hospital Stay:  Symptomatic hypokalemia    Total Time for Visit, including Counseling / Coordination of Care: 1 hour  Greater than 50% of this total time spent on direct patient counseling and coordination of care  Chief Complaint:   Hypokalemia    History of Present Illness:    Rowe Dakin is a 38 y o  female who presents with from nephrology office secondary to hypokalemia  She takes 40 mEq 6 times a day and also 80 mEq infusion 3 times a week  She has no cause of hypokalemia and scheduled for 2nd opinion in [facility] in October  She was referred as a direct admit as her potassium level was 2 3 she has associated fatigue denies any nausea denies any vomiting she has been handling her Topamax at a lower dose  She started with this left-sided flank pain that is been sharp and severe radiating down her groin into the left upper abdomen since Sunday does been like a days and no associated dysuria no hematuria she is urinating frequently  She does have a history of kidney stones       Review of Systems:    Review of Systems   Constitutional: Positive for activity change and fatigue  Negative for fever  HENT: Negative  Negative for ear pain, rhinorrhea and sore throat  Eyes: Negative  Negative for pain and itching  Respiratory: Negative  Negative for cough, chest tightness, shortness of breath and wheezing  Cardiovascular: Negative  Negative for chest pain, palpitations and leg swelling  Gastrointestinal: Negative  Negative for abdominal pain, diarrhea, nausea and vomiting  Endocrine: Negative for polydipsia, polyphagia and polyuria  Genitourinary: Positive for flank pain and frequency  Negative for dysuria  Musculoskeletal: Negative for back pain and myalgias  Skin: Negative  Negative for rash and wound  Neurological: Negative  Negative for dizziness, syncope, speech difficulty, weakness, light-headedness, numbness and headaches  Psychiatric/Behavioral: Negative for agitation, confusion and decreased concentration  All other systems reviewed and are negative  Past Medical and Surgical History:     Past Medical History:   Diagnosis Date    H  pylori infection     Hypokalemia     Migraine     Renal disorder     Rhabdomyolysis        Past Surgical History:   Procedure Laterality Date    ABDOMINAL SURGERY      APPENDECTOMY       SECTION      CHOLECYSTECTOMY      COSMETIC SURGERY      FL GUIDED CENTRAL VENOUS ACCESS DEVICE INSERTION  2018    GASTRIC BYPASS      HYSTERECTOMY      TUNNELED VENOUS PORT PLACEMENT N/A 2018    Procedure: INSERTION VENOUS PORT (PORT-A-CATH); Surgeon: Tomy Jimenez DO;  Location: [facility];  Service: General       Meds/Allergies:    Prior to Admission medications    Medication Sig Start Date End Date Taking?  Authorizing Provider   AMILoride 5 mg tablet Take 1 tablet (5 mg total) by mouth daily 19  Yes Nola Turner DO   amitriptyline (ELAVIL) 50 mg tablet Take 50 mg by mouth daily at bedtime 18 Yes Historical Provider, MD   ergocalciferol (ERGOCALCIFEROL) 1 25 MG (71458 UT) capsule Take 1 capsule (50,000 Units total) by mouth 2 (two) times a week Patient takes this med on Mondays  Patient taking differently: Take 50,000 Units by mouth once a week Mondays and Thursdays 3/26/20  Yes Kareem Boateng MD   ferrous sulfate 325 (65 Fe) mg tablet Take 325 mg by mouth every other day Last took 3/9/20   Yes Historical Provider, MD   other medication, see sig, *Potassium infusions 2x week   Yes Historical Provider, MD   pantoprazole (PROTONIX) 40 mg tablet Take 1 tablet (40 mg total) by mouth daily 8/10/20  Yes Nida Griffin,    potassium chloride 0 4 mEq/mL Infuse 200 mL (80 mEq total) into a venous catheter 3 (three) times a week 7/13/20  Yes Juan Carlos Ricketts,    potassium chloride 10 % oral solution Take 30 mL (40 mEq total) by mouth 6 (six) times a day 7/30/20 8/29/20 Yes Grace Nieto MD   sodium bicarbonate 650 mg tablet Take 1 tablet (650 mg total) by mouth 3 (three) times a day 7/30/20  Yes Grace Nieto MD   sucralfate (CARAFATE) 1 g tablet Take 1 tablet (1 g total) by mouth daily 8/10/20  Yes Nida Griffin DO   topiramate (TOPAMAX) 50 MG tablet Take 1 tablet (50 mg total) by mouth 2 (two) times a day 7/30/20  Yes Grace Nieto MD   b complex-C-folic acid (NEPHRO-EDIE) 0 8 mg tablet Take 0 8 mg by mouth daily with dinner    Historical Provider, MD   Catheters MISC by Does not apply route 2 (two) times a week Port care per Watson protocol 5/7/20   Nida Griffin DO   cyanocobalamin 1,000 mcg/mL Inject 1 mL (1,000 mcg total) into a muscle every 30 (thirty) days 5/4/20   Nida Griffin DO   SUMAtriptan (IMITREX) 100 mg tablet Take 100 mg by mouth once as needed for migraine     Historical Provider, MD   Syringe/Needle, Disp, (SYRINGE 3CC/25GX5/8") 25G X 5/8" 3 ML MISC Use once monthly for B12 injection   6/1/20   Nida Griffin,    thiamine 100 MG tablet Take 100 mg by mouth daily    Historical Provider, MD   zinc sulfate (ZINCATE) 220 mg capsule Take 1 capsule (220 mg total) by mouth daily 8/9/19   Nakia Jaramillo MD     I have reviewed home medications using allscripts  Allergies: Allergies   Allergen Reactions    Nsaids Other (See Comments)     Other reaction(s): Other (Please comment)  Should not take s/p bariatric surgery  Other reaction(s): Other (See Comments)  Should not take as per prior records  Should not take s/p bariatric surgery  Has hx of gastric bypass      Prednisone      Nausea, vomiting, diarrhea       Social History:     Marital Status: /Civil Union   Substance Use History:   Social History     Substance and Sexual Activity   Alcohol Use Never    Alcohol/week: 0 0 standard drinks    Frequency: Never    Drinks per session: Patient refused    Binge frequency: Never    Comment: 0     Social History     Tobacco Use   Smoking Status Never Smoker   Smokeless Tobacco Never Used     Social History     Substance and Sexual Activity   Drug Use Never       Family History:    Family History   Problem Relation Age of Onset    No Known Problems Mother     Hypertension Father     Diabetes Father     Diabetes Maternal Grandfather        Physical Exam:     Vitals:   Blood Pressure: 123/75 (08/17/20 1242)  Pulse: 101 (08/17/20 1242)  Temperature: 98 2 °F (36 8 °C) (08/17/20 1242)  Respirations: 14 (08/17/20 1242)  SpO2: 97 % (08/17/20 1242)    Physical Exam  Constitutional:       Appearance: Normal appearance  HENT:      Head: Normocephalic and atraumatic  Eyes:      Extraocular Movements: Extraocular movements intact  Pupils: Pupils are equal, round, and reactive to light  Neck:      Musculoskeletal: Normal range of motion and neck supple  Cardiovascular:      Rate and Rhythm: Normal rate and regular rhythm  Pulmonary:      Effort: Pulmonary effort is normal       Breath sounds: Normal breath sounds     Abdominal:      General: Bowel sounds are normal       Tenderness: There is abdominal tenderness (left side)  There is left CVA tenderness  Musculoskeletal: Normal range of motion  Skin:     General: Skin is warm  Neurological:      General: No focal deficit present  Mental Status: She is alert and oriented to person, place, and time  Mental status is at baseline  Psychiatric:         Mood and Affect: Mood normal              Additional Data:     Lab Results: I have personally reviewed pertinent films in PACS        Results from last 7 days   Lab Units 08/17/20  0914   SODIUM mmol/L 137   POTASSIUM mmol/L 2 3*   CHLORIDE mmol/L 100   CO2 mmol/L 25   BUN mg/dL 15   CREATININE mg/dL 1 25   ANION GAP mmol/L 12   CALCIUM mg/dL 9 3   GLUCOSE RANDOM mg/dL 111                       Imaging: I have personally reviewed pertinent films in PACS    CT renal stone study abdomen pelvis wo contrast    (Results Pending)       EKG, Pathology, and Other Studies Reviewed on Admission:   · EKG: will order    Allscripts / Epic Records Reviewed: Yes     ** Please Note: This note has been constructed using a voice recognition system   **

## 2020-08-17 NOTE — ASSESSMENT & PLAN NOTE
· She still has not seen her neurologist just yet  Will last admission I taper Topamax 50 mg p o  B i d  And she has been tolerating it continue tapering it further to 25 mg p o  B i d  As she has hypokalemia with no suspected nephrolithiasis and also previous non-anion gap metabolic acidosis which are all side effects of Topamax as well  Previously she was unable to be taken off cold turkey secondary to having rebound migraine    · Continue Elavil

## 2020-08-18 LAB
ANION GAP SERPL CALCULATED.3IONS-SCNC: 10 MMOL/L (ref 4–13)
ANION GAP SERPL CALCULATED.3IONS-SCNC: 11 MMOL/L (ref 4–13)
BUN SERPL-MCNC: 12 MG/DL (ref 5–25)
BUN SERPL-MCNC: 13 MG/DL (ref 5–25)
CALCIUM SERPL-MCNC: 8.2 MG/DL (ref 8.3–10.1)
CALCIUM SERPL-MCNC: 8.4 MG/DL (ref 8.3–10.1)
CHLORIDE SERPL-SCNC: 102 MMOL/L (ref 100–108)
CHLORIDE SERPL-SCNC: 102 MMOL/L (ref 100–108)
CO2 SERPL-SCNC: 23 MMOL/L (ref 21–32)
CO2 SERPL-SCNC: 25 MMOL/L (ref 21–32)
CREAT SERPL-MCNC: 1.04 MG/DL (ref 0.6–1.3)
CREAT SERPL-MCNC: 1.16 MG/DL (ref 0.6–1.3)
GFR SERPL CREATININE-BSD FRML MDRD: 61 ML/MIN/1.73SQ M
GFR SERPL CREATININE-BSD FRML MDRD: 69 ML/MIN/1.73SQ M
GLUCOSE SERPL-MCNC: 87 MG/DL (ref 65–140)
GLUCOSE SERPL-MCNC: 91 MG/DL (ref 65–140)
POTASSIUM SERPL-SCNC: 2.1 MMOL/L (ref 3.5–5.3)
POTASSIUM SERPL-SCNC: 3.6 MMOL/L (ref 3.5–5.3)
SODIUM SERPL-SCNC: 135 MMOL/L (ref 136–145)
SODIUM SERPL-SCNC: 138 MMOL/L (ref 136–145)

## 2020-08-18 PROCEDURE — 80048 BASIC METABOLIC PNL TOTAL CA: CPT | Performed by: FAMILY MEDICINE

## 2020-08-18 PROCEDURE — 99232 SBSQ HOSP IP/OBS MODERATE 35: CPT | Performed by: FAMILY MEDICINE

## 2020-08-18 PROCEDURE — 99254 IP/OBS CNSLTJ NEW/EST MOD 60: CPT | Performed by: INTERNAL MEDICINE

## 2020-08-18 RX ORDER — POTASSIUM CHLORIDE 14.9 MG/ML
40 INJECTION INTRAVENOUS ONCE
Status: COMPLETED | OUTPATIENT
Start: 2020-08-18 | End: 2020-08-18

## 2020-08-18 RX ORDER — POTASSIUM CHLORIDE 14.9 MG/ML
20 INJECTION INTRAVENOUS ONCE
Status: COMPLETED | OUTPATIENT
Start: 2020-08-18 | End: 2020-08-18

## 2020-08-18 RX ORDER — POTASSIUM CHLORIDE 20MEQ/15ML
60 LIQUID (ML) ORAL
Status: DISCONTINUED | OUTPATIENT
Start: 2020-08-18 | End: 2020-08-20

## 2020-08-18 RX ADMIN — ZINC SULFATE 220 MG (50 MG) CAPSULE 220 MG: CAPSULE at 08:52

## 2020-08-18 RX ADMIN — TOPIRAMATE 25 MG: 25 TABLET, FILM COATED ORAL at 08:52

## 2020-08-18 RX ADMIN — PANTOPRAZOLE SODIUM 40 MG: 40 TABLET, DELAYED RELEASE ORAL at 08:52

## 2020-08-18 RX ADMIN — POTASSIUM CHLORIDE 60 MEQ: 20 SOLUTION ORAL at 19:29

## 2020-08-18 RX ADMIN — AMITRIPTYLINE HYDROCHLORIDE 50 MG: 25 TABLET, FILM COATED ORAL at 21:45

## 2020-08-18 RX ADMIN — TOPIRAMATE 25 MG: 25 TABLET, FILM COATED ORAL at 17:06

## 2020-08-18 RX ADMIN — SUCRALFATE 1 G: 1 TABLET ORAL at 08:52

## 2020-08-18 RX ADMIN — THIAMINE HCL TAB 100 MG 100 MG: 100 TAB at 08:52

## 2020-08-18 RX ADMIN — POTASSIUM CHLORIDE 60 MEQ: 20 SOLUTION ORAL at 16:43

## 2020-08-18 RX ADMIN — POTASSIUM CHLORIDE 20 MEQ: 14.9 INJECTION, SOLUTION INTRAVENOUS at 21:45

## 2020-08-18 RX ADMIN — MORPHINE SULFATE 2 MG: 2 INJECTION, SOLUTION INTRAMUSCULAR; INTRAVENOUS at 21:56

## 2020-08-18 RX ADMIN — MORPHINE SULFATE 2 MG: 2 INJECTION, SOLUTION INTRAMUSCULAR; INTRAVENOUS at 15:26

## 2020-08-18 RX ADMIN — Medication 1 CAPSULE: at 16:43

## 2020-08-18 RX ADMIN — SODIUM BICARBONATE 650 MG TABLET 650 MG: at 08:52

## 2020-08-18 RX ADMIN — POTASSIUM CHLORIDE 20 MEQ: 14.9 INJECTION, SOLUTION INTRAVENOUS at 06:17

## 2020-08-18 RX ADMIN — POTASSIUM CHLORIDE 40 MEQ: 20 SOLUTION ORAL at 06:17

## 2020-08-18 RX ADMIN — POTASSIUM CHLORIDE 40 MEQ: 14.9 INJECTION, SOLUTION INTRAVENOUS at 15:21

## 2020-08-18 RX ADMIN — POTASSIUM CHLORIDE 40 MEQ: 14.9 INJECTION, SOLUTION INTRAVENOUS at 11:04

## 2020-08-18 RX ADMIN — AMILORIDE HYDROCHLORIDE 5 MG: 5 TABLET ORAL at 08:53

## 2020-08-18 RX ADMIN — POTASSIUM CHLORIDE 60 MEQ: 20 SOLUTION ORAL at 14:05

## 2020-08-18 RX ADMIN — MORPHINE SULFATE 2 MG: 2 INJECTION, SOLUTION INTRAMUSCULAR; INTRAVENOUS at 10:56

## 2020-08-18 RX ADMIN — POTASSIUM CHLORIDE 40 MEQ: 20 SOLUTION ORAL at 09:29

## 2020-08-18 RX ADMIN — POTASSIUM CHLORIDE 60 MEQ: 20 SOLUTION ORAL at 21:45

## 2020-08-18 RX ADMIN — MORPHINE SULFATE 2 MG: 2 INJECTION, SOLUTION INTRAMUSCULAR; INTRAVENOUS at 05:26

## 2020-08-18 NOTE — ASSESSMENT & PLAN NOTE
· She still has not seen her neurologist outpatient since last discharge from the hospital   · she has hypokalemia with no suspected nephrolithiasis and also previous non-anion gap metabolic acidosis which are all side effects of Topamax as well  Previously she was unable to be taken off cold turkey secondary to having rebound migraine    Continue gradual taper and take her down to 25 mg of Topamax b i d   · Continue Elavil  · Denies any headache

## 2020-08-18 NOTE — ASSESSMENT & PLAN NOTE
· Recurrent severe hypokalemia  ·  follows outpatient with Nephrology   She has been compliant with her KCl 6 times a day and 3 times/week infusions of 80 mEq KCl IV  She had an extensive workup done by nephro for her hypokalemia and now is scheduled to go for research trial in [facility] in October  She previously was evaluated by bariatric surgery who declined to reverse her gastric bypass  · Symptomatic  · Increase her oral KCl to 60 mEq 6 times daily and also placed on 80 mEq of IV KCl supplementation today as her potassium level is 2 1  Magnesium level is normal  · Recheck BMP at 7:00 p m  Today and again in the morning  · I did a taper of her Topamax last time she is currently on 50 mg p o  Q 12 hours and she has been tolerating she has not been able to see her neurologist so I will taper further to 25 mg p o  Q 12 hours

## 2020-08-18 NOTE — CONSULTS
Consultation - Nephrology   Azalea Wan 38 y o  female MRN: [id number]  Unit/Bed#: -01 Encounter: [id number]      7  Acute on chronic hypokalemia  First documented in July 2018 and has had extensive workup in the past regarding this issue, including ruling out renal tubular acidosis, renal losses, GI losses with a normal colonoscopy in the past   Her urine potassium is chronically low  There is no evidence of diuretic or laxative abuse or self-induced vomiting  She has been compliant with her KCl 6 times a day and 3 times/week infusions of 80 mEq KCl IV  She had an extensive workup done by Dr Tre Ha for her hypokalemia and now is scheduled to go for research trial in [facility] in October  She previously was evaluated by bariatric surgery who declined to reverse her gastric bypass  She is currently going for further GI eval in [facility] with gastrin, VIP, serotonin level pending  Agree with Increasing her oral KCl to 60 mEq 6 times daily and  80 mEq of IV KCl supplementation today as her potassium level is 2 1  Magnesium level is normal  Recheck BMP at 7:00 p m  Today and again in the morning  Continue amiloride -- ENAC channel blocker -- will spare K and will augment raising K level  I also agree with tapering off Topiramate which can cause mixed RTA and can contribute to hypokalemia and metabolic acidosis which is probably was evident in last admission and she was taking bicarb supplement-- which can further lower down the K level  So tapering off topiramate and finding alternative migraine medication recommended  2  Resolved Anion Gap Metabolic acidosis  Topiramate which can cause mixed RTA and can contribute to hypokalemia and metabolic acidosis which is probably was evident in last admission and she was taking bicarb supplement-- which can further lower down the K level  So tapering off topiramate and finding alternative migraine medication recommended       Now bicarb level normal-- will stop bicarb supplement  3  Presented with TICO as well but with k supplement  TICO resolving -- cr  improved from 1 32 -->1 04-- AM labs to follow    4  [facility] EN Y Gastric bypass  Performed in 2015  Patient's acute on chronic hypokalemia has not been labeled as an absorption problem  Left flank pain -- but CT abd neg for renal stone  UA also neg for any RBC -- unlikely renal stone  5  Weakness  Weakness of legs, numbness, tingling, especially oral numbness, cramping are all symptoms patient frequently describes when her potassium is low         Thank you for allowing us to participate in the care of your patient  Please feel free to contact us regarding the care of this patient, or any other questions/concerns that may be applicable      Patient Active Problem List   Diagnosis    Hypokalemia    History of gastric bypass    Migraine without aura and without status migrainosus, not intractable    Left-sided weakness    Hypophosphatemia    Chronic anemia    Vitamin D deficiency    Elevated CPK    Vitamin B12 deficiency    Cervical lymphadenopathy    Fatigue    Paresthesia of both lower extremities    Paresthesias    B12 deficiency    Gastric bypass status for obesity    GERD (gastroesophageal reflux disease)    Migraine    WAGNER (obstructive sleep apnea)    Other intestinal malabsorption    Acute kidney injury ([address] )    Right ovarian cyst    Chest pain    Nausea & vomiting    Vertigo    Stress fracture of right foot with routine healing    Normal anion gap metabolic acidosis    Middle ear effusion    Left flank pain       History of Present Illness   Physician Requesting Consult: Edita Vicente MD  Reason for Consult / Principal Problem: Acute on recurrent hypokalemia  Hx and PE limited by:   HPI: Eliot Patel is a 38y o  year old female   Eliot Patel is a 38 y o  female with an active problem list significant for gastric bypass, acute on chronic hypokalemia, migraine, who is known to this service for persistent and severe hypokalemia  She has been worked up for this at several facilities including [address] [place] [address], [facility]  Per my discussion with the patient, she is also currently in the process of being enrolled in genetic testing and genetic directed therapy in [facility] this year  Her potassium is typically maintained on 200 mEq p o  At home in addition to intravenous infusions of potassium multiple times per week  She was referred as a direct admit as her potassium level was 2 3 she has associated fatigue denies any nausea denies any vomiting she has been handling her Topamax at a lower dose  She started with this left-sided flank pain that is been sharp and severe radiating down her groin into the left upper abdomen since  does been like a days and no associated dysuria no hematuria she is urinating frequently  She does have a history of kidney stones  Upon discussion with the patient, she denies any nausea, vomiting, diarrhea  She states that her recent illness was limited to strep throat, no other issues  No sick contacts  No chest pain, shortness of breath, dizziness, numbness, tingling today  No dysuria, urgency, frequency  No alcohol ingestion      History obtained from chart review and the patient    Review of Systems - Negative except as mentioned above in HPI, more specifics as mentioned below    Review of Systems - Negative except HPI    Historical Information   Past Medical History:   Diagnosis Date    H  pylori infection     Hypokalemia     Migraine     Renal disorder     Rhabdomyolysis      Past Surgical History:   Procedure Laterality Date    ABDOMINAL SURGERY      APPENDECTOMY       SECTION      CHOLECYSTECTOMY      COSMETIC SURGERY      FL GUIDED CENTRAL VENOUS ACCESS DEVICE INSERTION  2018    GASTRIC BYPASS      HYSTERECTOMY      TUNNELED VENOUS PORT PLACEMENT N/A 2018 Procedure: INSERTION VENOUS PORT (PORT-A-CATH);   Surgeon: Emery Quevedo DO;  Location: [facility];  Service: General     Social History   Social History     Substance and Sexual Activity   Alcohol Use Never    Alcohol/week: 0 0 standard drinks    Frequency: Never    Drinks per session: Patient refused    Binge frequency: Never    Comment: 0     Social History     Substance and Sexual Activity   Drug Use Never     Social History     Tobacco Use   Smoking Status Never Smoker   Smokeless Tobacco Never Used     Family History   Problem Relation Age of Onset    No Known Problems Mother     Hypertension Father     Diabetes Father     Diabetes Maternal Grandfather        Meds/Allergies   all current active meds have been reviewed, current meds:   Current Facility-Administered Medications   Medication Dose Route Frequency    acetaminophen (TYLENOL) tablet 650 mg  650 mg Oral Q6H PRN    AMILoride tablet 5 mg  5 mg Oral Daily    amitriptyline (ELAVIL) tablet 50 mg  50 mg Oral HS    b complex-vitamin C-folic acid (NEPHROCAPS) capsule 1 capsule  1 capsule Oral Daily With Dinner    [START ON 8/19/2020] ferrous sulfate tablet 325 mg  325 mg Oral Every Other Day    morphine injection 2 mg  2 mg Intravenous Q4H PRN    ondansetron (ZOFRAN) injection 4 mg  4 mg Intravenous Q6H PRN    pantoprazole (PROTONIX) EC tablet 40 mg  40 mg Oral Daily    potassium chloride 10 % oral solution 60 mEq  60 mEq Oral 6x Daily    potassium chloride 20 mEq IVPB (premix)  40 mEq Intravenous Once    potassium chloride 20 mEq IVPB (premix)  40 mEq Intravenous Once    sodium bicarbonate tablet 650 mg  650 mg Oral BID after meals    sucralfate (CARAFATE) tablet 1 g  1 g Oral Daily    thiamine (VITAMIN B1) tablet 100 mg  100 mg Oral Daily    topiramate (TOPAMAX) tablet 25 mg  25 mg Oral BID    zinc sulfate (ZINCATE) capsule 220 mg  220 mg Oral Daily    and PTA meds:    Medications Prior to Admission   Medication    AMILoride 5 mg tablet    amitriptyline (ELAVIL) 50 mg tablet    ergocalciferol (ERGOCALCIFEROL) 1 25 MG (83311 UT) capsule    ferrous sulfate 325 (65 Fe) mg tablet    other medication, see sig,    pantoprazole (PROTONIX) 40 mg tablet    potassium chloride 0 4 mEq/mL    potassium chloride 10 % oral solution    sodium bicarbonate 650 mg tablet    sucralfate (CARAFATE) 1 g tablet    topiramate (TOPAMAX) 50 MG tablet    b complex-C-folic acid (NEPHRO-EDIE) 0 8 mg tablet    Catheters MISC    cyanocobalamin 1,000 mcg/mL    SUMAtriptan (IMITREX) 100 mg tablet    Syringe/Needle, Disp, (SYRINGE 3CC/25GX5/8") 25G X 5/8" 3 ML MISC    thiamine 100 MG tablet    zinc sulfate (ZINCATE) 220 mg capsule         Allergies   Allergen Reactions    Nsaids Other (See Comments)     Other reaction(s): Other (Please comment)  Should not take s/p bariatric surgery  Other reaction(s): Other (See Comments)  Should not take as per prior records  Should not take s/p bariatric surgery  Has hx of gastric bypass      Prednisone      Nausea, vomiting, diarrhea       Objective     Intake/Output Summary (Last 24 hours) at 8/18/2020 1027  Last data filed at 8/18/2020 0919  Gross per 24 hour   Intake 1181 25 ml   Output --   Net 1181 25 ml       Invasive Devices:        Physical Exam      I/O last 3 completed shifts: In: 1061 3 [P O :120; I V :941 3]  Out: -     Vitals:    08/18/20 0720   BP: 112/69   Pulse: 87   Resp: 13   Temp: 98 3 °F (36 8 °C)   SpO2: 98%       Gen: in NAD, Alert/Awake  HEENT: no sclerous icterus, MMM, neck supple  CV: +S1/S2, RRR  Lungs: CTA bilaterally, port on rt chest C/D/I  Abd: +BS, soft NT/ND  Ext: all four extremities are warm  Skin: no rashes noted  Neuro: CN II-XII intact    Current Weight:    First Weight:      Lab Results:  I have personally reviewed pertinent labs      CBC:   Lab Results   Component Value Date    WBC 8 10 08/17/2020    HGB 13 5 08/17/2020    HCT 42 1 08/17/2020    MCV 84 08/17/2020     08/17/2020    MCH 27 0 08/17/2020    MCHC 32 1 08/17/2020    RDW 18 1 (H) 08/17/2020    MPV 11 1 08/17/2020    NRBC 0 08/17/2020     CMP:   Lab Results   Component Value Date    K 2 1 (LL) 08/18/2020     08/18/2020    CO2 25 08/18/2020    BUN 13 08/18/2020    CREATININE 1 04 08/18/2020    CALCIUM 8 2 (L) 08/18/2020    EGFR 69 08/18/2020     Phosphorus: No results found for: PHOS  Magnesium:   Lab Results   Component Value Date    MG 2 5 08/17/2020     Urinalysis:   Lab Results   Component Value Date    COLORU Yellow 08/17/2020    CLARITYU Clear 08/17/2020    SPECGRAV 1 015 08/17/2020    PHUR 6 5 08/17/2020    LEUKOCYTESUR Negative 08/17/2020    NITRITE Negative 08/17/2020    GLUCOSEU Negative 08/17/2020    KETONESU Trace (A) 08/17/2020    BILIRUBINUR Small (A) 08/17/2020    BLOODU Negative 08/17/2020     Ionized Calcium: No results found for: CAION  Coagulation: No results found for: PT, INR, APTT  Troponin: No results found for: TROPONINI  ABG: No results found for: PHART, XBG0ZDJ, PO2ART, ONN5WRU, S3OANKJT, BEART, SOURCE    Results from last 7 days   Lab Units 08/18/20  0527 08/17/20  1920 08/17/20  0914   POTASSIUM mmol/L 2 1* 2 4* 2 3*   CHLORIDE mmol/L 102 98* 100   CO2 mmol/L 25 21 25   BUN mg/dL 13 15 15   CREATININE mg/dL 1 04 1 31* 1 25   CALCIUM mg/dL 8 2* 8 6 9 3       Radiology review:  Procedure: Ct Renal Stone Study Abdomen Pelvis Wo Contrast    Result Date: 8/17/2020  Narrative: CT ABDOMEN AND PELVIS WITHOUT IV CONTRAST - LOW DOSE RENAL STONE INDICATION:   Flank pain, kidney stone suspected  7/30/2018 COMPARISON:  None  TECHNIQUE:  Low dose thin section CT examination of the abdomen and pelvis was performed without intravenous or oral contrast according to a protocol specifically designed to evaluate for urinary tract calculus  Axial, sagittal, and coronal 2D reformatted images were created from the source data and submitted for interpretation     Evaluation for pathology in the abdomen and pelvis that is unrelated to urinary tract calculi is limited  Radiation dose length product (DLP) for this visit:  278 mGy-cm   This examination, like all CT scans performed in the [facility], was performed utilizing techniques to minimize radiation dose exposure, including the use of iterative reconstruction and automated exposure control  FINDINGS: RIGHT KIDNEY AND URETER: No urinary tract calculi  No hydronephrosis or hydroureter  LEFT KIDNEY AND URETER: No urinary tract calculi  No hydronephrosis or hydroureter  URINARY BLADDER: Unremarkable  No significant abnormality in the visualized lung bases  Limited low radiation dose noncontrast CT evaluation demonstrates no clinically significant abnormality of liver, spleen, pancreas, or adrenal glands  The gallbladder is surgically absent  No ascites or bulky lymphadenopathy on this limited noncontrast study  Patient is status post gastric bypass surgery  Distended loops of large bowel appears fluid-filled with postoperative change at the distal sigmoid  No dl obstruction  Limited evaluation demonstrates no evidence to suggest acute appendicitis  No acute fracture or destructive osseous lesion is identified  Impression: 1  Distended fluid-filled large bowel with a grossly patent sigmoid anastomosis as above  No dl obstruction or free air  2   No urinary calculi  Workstation performed: [id number]         EKG, Pathology, and Other Studies: Reviewed      Counseling / Coordination of Care  Total ADDITIONAL floor / unit time spent today 50 minutes  Greater than 50% of total time was spent with the patient and / or family counseling and / or coordination of care  A description of the counseling / coordination of care: Primary attending and ancillary staff    Luca Hay MD      This consultation note was produced in part using a dictation device which may document imprecise wording from author's original intent

## 2020-08-18 NOTE — PLAN OF CARE
Problem: Potential for Falls  Goal: Patient will remain free of falls  Description: INTERVENTIONS:  - Assess patient frequently for physical needs  -  Identify cognitive and physical deficits and behaviors that affect risk of falls    -  [place] fall precautions as indicated by assessment   - Educate patient/family on patient safety including physical limitations  - Instruct patient to call for assistance with activity based on assessment  - Modify environment to reduce risk of injury  - Consider OT/PT consult to assist with strengthening/mobility  Outcome: Not Progressing     Problem: PAIN - ADULT  Goal: Verbalizes/displays adequate comfort level or baseline comfort level  Description: Interventions:  - Encourage patient to monitor pain and request assistance  - Assess pain using appropriate pain scale  - Administer analgesics based on type and severity of pain and evaluate response  - Implement non-pharmacological measures as appropriate and evaluate response  - Consider cultural and social influences on pain and pain management  - Notify physician/advanced practitioner if interventions unsuccessful or patient reports new pain  Outcome: Not Progressing     Problem: INFECTION - ADULT  Goal: Absence or prevention of progression during hospitalization  Description: INTERVENTIONS:  - Assess and monitor for signs and symptoms of infection  - Monitor lab/diagnostic results  - Monitor all insertion sites, i e  indwelling lines, tubes, and drains  - Monitor endotracheal if appropriate and nasal secretions for changes in amount and color  - [place] appropriate cooling/warming therapies per order  - Administer medications as ordered  - Instruct and encourage patient and family to use good hand hygiene technique  - Identify and instruct in appropriate isolation precautions for identified infection/condition  Outcome: Not Progressing     Problem: SAFETY ADULT  Goal: Patient will remain free of falls  Description: INTERVENTIONS:  - Assess patient frequently for physical needs  -  Identify cognitive and physical deficits and behaviors that affect risk of falls    -  [place] fall precautions as indicated by assessment   - Educate patient/family on patient safety including physical limitations  - Instruct patient to call for assistance with activity based on assessment  - Modify environment to reduce risk of injury  - Consider OT/PT consult to assist with strengthening/mobility  Outcome: Not Progressing  Goal: Maintain or return to baseline ADL function  Description: INTERVENTIONS:  -  Assess patient's ability to carry out ADLs; assess patient's baseline for ADL function and identify physical deficits which impact ability to perform ADLs (bathing, care of mouth/teeth, toileting, grooming, dressing, etc )  - Assess/evaluate cause of self-care deficits   - Assess range of motion  - Assess patient's mobility; develop plan if impaired  - Assess patient's need for assistive devices and provide as appropriate  - Encourage maximum independence but intervene and supervise when necessary  - Involve family in performance of ADLs  - Assess for home care needs following discharge   - Consider OT consult to assist with ADL evaluation and planning for discharge  - Provide patient education as appropriate  Outcome: Not Progressing     Problem: DISCHARGE PLANNING  Goal: Discharge to home or other facility with appropriate resources  Description: INTERVENTIONS:  - Identify barriers to discharge w/patient and caregiver  - Arrange for needed discharge resources and transportation as appropriate  - Identify discharge learning needs (meds, wound care, etc )  - Arrange for interpretive services to assist at discharge as needed  - Refer to Case Management Department for coordinating discharge planning if the patient needs post-hospital services based on physician/advanced practitioner order or complex needs related to functional status, cognitive ability, or social support system  Outcome: Not Progressing     Problem: Knowledge Deficit  Goal: Patient/family/caregiver demonstrates understanding of disease process, treatment plan, medications, and discharge instructions  Description: Complete learning assessment and assess knowledge base    Interventions:  - Provide teaching at level of understanding  - Provide teaching via preferred learning methods  Outcome: Not Progressing     Problem: CARDIOVASCULAR - ADULT  Goal: Maintains optimal cardiac output and hemodynamic stability  Description: INTERVENTIONS:  - Monitor I/O, vital signs and rhythm  - Monitor for S/S and trends of decreased cardiac output  - Administer and titrate ordered vasoactive medications to optimize hemodynamic stability  - Assess quality of pulses, skin color and temperature  - Assess for signs of decreased coronary artery perfusion  - Instruct patient to report change in severity of symptoms  Outcome: Not Progressing  Goal: Absence of cardiac dysrhythmias or at baseline rhythm  Description: INTERVENTIONS:  - Continuous cardiac monitoring, vital signs, obtain 12 lead EKG if ordered  - Administer antiarrhythmic and heart rate control medications as ordered  - Monitor electrolytes and administer replacement therapy as ordered  Outcome: Not Progressing     Problem: METABOLIC, FLUID AND ELECTROLYTES - ADULT  Goal: Electrolytes maintained within normal limits  Description: INTERVENTIONS:  - Monitor labs and assess patient for signs and symptoms of electrolyte imbalances  - Administer electrolyte replacement as ordered  - Monitor response to electrolyte replacements, including repeat lab results as appropriate  - Instruct patient on fluid and nutrition as appropriate  Outcome: Not Progressing  Goal: Fluid balance maintained  Description: INTERVENTIONS:  - Monitor labs   - Monitor I/O and WT  - Instruct patient on fluid and nutrition as appropriate  - Assess for signs & symptoms of volume excess or deficit  Outcome: Not Progressing

## 2020-08-18 NOTE — PROGRESS NOTES
Progress Note - Ben Amin 1985, 38 y o  female MRN: [id number]    Unit/Bed#: -01 Encounter: [id number]    Primary Care Provider: Wolf Melvin DO   Date and time admitted to hospital: 8/17/2020 12:37 PM        * Hypokalemia  Assessment & Plan  · Recurrent severe hypokalemia  ·  follows outpatient with Nephrology   She has been compliant with her KCl 6 times a day and 3 times/week infusions of 80 mEq KCl IV  She had an extensive workup done by nephro for her hypokalemia and now is scheduled to go for research trial in [facility] in October  She previously was evaluated by bariatric surgery who declined to reverse her gastric bypass  · Symptomatic  · Increase her oral KCl to 60 mEq 6 times daily and also placed on 80 mEq of IV KCl supplementation today as her potassium level is 2 1  Magnesium level is normal  · Recheck BMP at 7:00 p m  Today and again in the morning  · I did a taper of her Topamax last time she is currently on 50 mg p o  Q 12 hours and she has been tolerating she has not been able to see her neurologist so I will taper further to 25 mg p o  Q 12 hours  Left flank pain  Assessment & Plan  · Patient is complaining of left-sided flank pain  CT renal stone study is negative for kidney stones  · Noted to have Distended fluid-filled large bowel with a grossly patent sigmoid anastomosis   · Monitor for now  Normal anion gap metabolic acidosis  Assessment & Plan  · Resolved     · Topamax is being tapered off as well    GERD (gastroesophageal reflux disease)  Assessment & Plan  · Continue PPI    Vitamin B12 deficiency  Assessment & Plan  · She is on vitamin B12 injection monthly    Chronic anemia  Assessment & Plan  · Stable  · Hold iron due to abdominal discomfort    Migraine without aura and without status migrainosus, not intractable  Assessment & Plan  · She still has not seen her neurologist outpatient since last discharge from the hospital   · she has hypokalemia with no suspected nephrolithiasis and also previous non-anion gap metabolic acidosis which are all side effects of Topamax as well  Previously she was unable to be taken off cold turkey secondary to having rebound migraine  Continue gradual taper and take her down to 25 mg of Topamax b i d   · Continue Elavil  · Denies any headache    History of gastric bypass  Assessment & Plan  · Resume her vitamin supplementation      VTE Pharmacologic Prophylaxis:   Pharmacologic: Pharmacologic VTE Prophylaxis contraindicated due to Low risk  Mechanical VTE Prophylaxis in Place: Yes    Patient Centered Rounds: I have performed bedside rounds with nursing staff today  Discussions with Specialists or Other Care Team Provider:  Discussed with Nephrology    Education and Discussions with Family / Patient:  Discussed with patient at bedside    Time Spent for Care: 30 minutes  More than 50% of total time spent on counseling and coordination of care as described above  Current Length of Stay: 1 day(s)    Current Patient Status: Inpatient   Certification Statement: The patient will continue to require additional inpatient hospital stay due to Hypokalemia severe    Discharge Plan: Will discharge home once potassium is more than 3 5    Code Status: Level 1 - Full Code      Subjective:   Patient denies any chest pain or shortness of breath but does complain of left-sided flank pain  Denies any nausea vomiting or diarrhea  States she is eating and drinking okay    She states that she had to do a test for sibo and was NPO 1 whole day but was continuously doing her IV infusions for potassium and she feels that that might have caused her to get low on her potassium again    Objective:     Vitals:   Temp (24hrs), Av 3 °F (36 8 °C), Min:98 2 °F (36 8 °C), Max:98 5 °F (36 9 °C)    Temp:  [98 2 °F (36 8 °C)-98 5 °F (36 9 °C)] 98 3 °F (36 8 °C)  HR:  [] 87  Resp:  [13-18] 13  BP: (112-127)/(69-85) 112/69  SpO2:  [97 %-99 %] 98 %  There is no height or weight on file to calculate BMI  Input and Output Summary (last 24 hours): Intake/Output Summary (Last 24 hours) at 8/18/2020 1003  Last data filed at 8/18/2020 0919  Gross per 24 hour   Intake 1181 25 ml   Output --   Net 1181 25 ml       Physical Exam:     Physical Exam  Vitals signs and nursing note reviewed  Constitutional:       Appearance: Normal appearance  She is ill-appearing  HENT:      Head: Normocephalic and atraumatic  Right Ear: External ear normal       Left Ear: External ear normal       Nose: Nose normal       Mouth/Throat:      Pharynx: Oropharynx is clear  Neck:      Musculoskeletal: Normal range of motion and neck supple  Cardiovascular:      Rate and Rhythm: Normal rate and regular rhythm  Heart sounds: Normal heart sounds  Pulmonary:      Effort: Pulmonary effort is normal       Breath sounds: Normal breath sounds  Abdominal:      General: Bowel sounds are normal       Palpations: Abdomen is soft  Tenderness: There is no abdominal tenderness  Comments: Left-sided tenderness of the flank and mild left-sided pelvic pain   Musculoskeletal: Normal range of motion  Skin:     General: Skin is warm and dry  Capillary Refill: Capillary refill takes less than 2 seconds  Neurological:      General: No focal deficit present  Mental Status: She is alert and oriented to person, place, and time     Psychiatric:         Mood and Affect: Mood normal            Additional Data:     Labs:    Results from last 7 days   Lab Units 08/17/20  1451   WBC Thousand/uL 8 10   HEMOGLOBIN g/dL 13 5   HEMATOCRIT % 42 1   PLATELETS Thousands/uL 366   NEUTROS PCT % 78*   LYMPHS PCT % 15   MONOS PCT % 5   EOS PCT % 1     Results from last 7 days   Lab Units 08/18/20  0527   SODIUM mmol/L 138   POTASSIUM mmol/L 2 1*   CHLORIDE mmol/L 102   CO2 mmol/L 25   BUN mg/dL 13   CREATININE mg/dL 1 04   ANION GAP mmol/L 11   CALCIUM mg/dL 8 2*   GLUCOSE RANDOM mg/dL 91                           * I Have Reviewed All Lab Data Listed Above  * Additional Pertinent Lab Tests Reviewed: Veto 66 Admission Reviewed    Imaging:    Imaging Reports Reviewed Today Include:  CT renal stone  Imaging Personally Reviewed by Myself Includes:  None    Recent Cultures (last 7 days):           Last 24 Hours Medication List:   Current Facility-Administered Medications   Medication Dose Route Frequency Provider Last Rate    acetaminophen  650 mg Oral Q6H PRN Collins Villafuerte MD      AMILoride  5 mg Oral Daily Collins Villafuerte MD      amitriptyline  50 mg Oral HS Collins Villafuerte MD      b complex-vitamin C-folic acid  1 capsule Oral Daily With Dinner Collins Villafuerte MD      [START ON 8/19/2020] ferrous sulfate  325 mg Oral Every Other Day Collins Villafuerte MD      morphine injection  2 mg Intravenous Q4H PRN Collins Villafuerte MD      ondansetron  4 mg Intravenous Q6H PRN Collins Villafuerte MD      pantoprazole  40 mg Oral Daily Collins Villafuerte MD      potassium chloride  60 mEq Oral 6x Daily Pau Bustillos MD      potassium chloride  40 mEq Intravenous Once Pau Bustillos MD      potassium chloride  40 mEq Intravenous Once Pau Bustillos MD      sodium bicarbonate  650 mg Oral BID after meals Collins Villafuerte MD      sucralfate  1 g Oral Daily Collins Villafuerte MD      thiamine  100 mg Oral Daily Collins Villafuerte MD      topiramate  25 mg Oral BID Collins Villafuerte MD      zinc sulfate  220 mg Oral Daily Collins Villafuerte MD          Today, Patient Was Seen By: Pau Bustillos MD    ** Please Note: Dictation voice to text software may have been used in the creation of this document   **

## 2020-08-18 NOTE — PLAN OF CARE
Problem: Potential for Falls  Goal: Patient will remain free of falls  Description: INTERVENTIONS:  - Assess patient frequently for physical needs  -  Identify cognitive and physical deficits and behaviors that affect risk of falls    -  Enola fall precautions as indicated by assessment   - Educate patient/family on patient safety including physical limitations  - Instruct patient to call for assistance with activity based on assessment  - Modify environment to reduce risk of injury  - Consider OT/PT consult to assist with strengthening/mobility  Outcome: Progressing

## 2020-08-18 NOTE — UTILIZATION REVIEW
Initial Clinical Review    Admission: Date/Time/Statement:   Admission Orders (From admission, onward)     Ordered        08/17/20 1344  Inpatient Admission  Once                   Orders Placed This Encounter   Procedures    Inpatient Admission     Standing Status:   Standing     Number of Occurrences:   1     Order Specific Question:   Admitting Physician     Answer:   Leila Ladmarilyn [[id number]]     Order Specific Question:   Level of Care     Answer:   Med Surg [16]     Order Specific Question:   Bed request comments     Answer:   telemetry     Order Specific Question:   Estimated length of stay     Answer:   More than 2 Midnights     Order Specific Question:   Certification     Answer:   I certify that inpatient services are medically necessary for this patient for a duration of greater than two midnights  See H&P and MD Progress Notes for additional information about the patient's course of treatment  Assessment/Plan:38year old female directly admitted to inpatient unit for hypokalemia and fatigue  H/o bariatric surgery and frequent hypokalemia for which she is compliant with po potassium and gets infusions 3 times a week  Found to have potassium of 2 3 in addition to left sided flank pain radiating to her groin  H/O kidney stones  She follow with nephrology and is due to get a second opinion regarding hypokalemia  Declined bariatric surgery reversal    Will increase her po potassium and started on 80 meq IV Kcl  Mag is normal   Recheck BMP  CT stone study negative for kidney stones  She is ill appearing        Temperature Pulse Respirations Blood Pressure SpO2   08/17/20 1242 08/17/20 1242 08/17/20 1242 08/17/20 1242 08/17/20 1242   98 2 °F (36 8 °C) 101 14 123/75 97 %      Temp src Heart Rate Source Patient Position - Orthostatic VS BP Location FiO2 (%)   -- -- -- -- --             Pain Score       08/17/20 1314       6          Wt Readings from Last 1 Encounters:   07/13/20 65 5 kg (144 lb 6 4 oz)     Additional Vital Signs:   Time   Temp   Pulse   Resp   BP   MAP (mmHg)   SpO2    08/18/20 11:02:41   --   89   14   119/72   88   97 %    08/18/20 07:20:37   98 3 °F (36 8 °C)   87   13   112/69   83   98 %    08/17/20 22:16:32   98 5 °F (36 9 °C)   91   17   113/71   85   97 %    08/17/20 19:10:15   98 3 °F (36 8 °C)   89   18   121/85   97   97 %    08/17/20 15:08:59   98 3 °F (36 8 °C)   92   18   127/72   90   99 %    08/17/20 12:42:03   98 2 °F (36 8 °C)   101   14            Pertinent Labs/Diagnostic Test Results:   8/17 CT A/P:     Distended fluid-filled large bowel with a grossly patent sigmoid anastomosis as above   No dl obstruction or free air  No urinary calculi         Results from last 7 days   Lab Units 08/17/20  1451   WBC Thousand/uL 8 10   HEMOGLOBIN g/dL 13 5   HEMATOCRIT % 42 1   PLATELETS Thousands/uL 366   NEUTROS ABS Thousands/µL 6 32     Results from last 7 days   Lab Units 08/18/20  0527 08/17/20  1920 08/17/20  0914   SODIUM mmol/L 138 134* 137   POTASSIUM mmol/L 2 1* 2 4* 2 3*   CHLORIDE mmol/L 102 98* 100   CO2 mmol/L 25 21 25   ANION GAP mmol/L 11 15* 12   BUN mg/dL 13 15 15   CREATININE mg/dL 1 04 1 31* 1 25   EGFR ml/min/1 73sq m 69 52 55   CALCIUM mg/dL 8 2* 8 6 9 3   MAGNESIUM mg/dL  --  2 5  --              Results from last 7 days   Lab Units 08/18/20  0527 08/17/20  1920 08/17/20  0914   GLUCOSE RANDOM mg/dL 91 98 111       Results from last 7 days   Lab Units 08/17/20  1451   LIPASE u/L 180             Results from last 7 days   Lab Units 08/17/20  1647   CLARITY UA  Clear   COLOR UA  Yellow   SPEC GRAV UA  1 015   PH UA  6 5   GLUCOSE UA mg/dl Negative   KETONES UA mg/dl Trace*   BLOOD UA  Negative   PROTEIN UA mg/dl Negative   NITRITE UA  Negative   BILIRUBIN UA  Small*   UROBILINOGEN UA E U /dl 0 2   LEUKOCYTES UA  Negative       Past Medical History:   Diagnosis Date    H  pylori infection     Hypokalemia     Migraine     Renal disorder     Rhabdomyolysis Admitting Diagnosis: Hypokalemia  Age/Sex: 38 y o  female  Admission Orders:  BMP  Activity as tolerated   TEle    Scheduled Medications:  AMILoride, 5 mg, Oral, Daily  amitriptyline, 50 mg, Oral, HS  b complex-vitamin C-folic acid, 1 capsule, Oral, Daily With Dinner  [START ON 8/19/2020] ferrous sulfate, 325 mg, Oral, Every Other Day  pantoprazole, 40 mg, Oral, Daily  potassium chloride, 60 mEq, Oral, 6x Daily  potassium chloride, 40 mEq, Intravenous, Once  potassium chloride, 40 mEq, Intravenous, Once  sodium bicarbonate, 650 mg, Oral, BID after meals  sucralfate, 1 g, Oral, Daily  thiamine, 100 mg, Oral, Daily  topiramate, 25 mg, Oral, BID  zinc sulfate, 220 mg, Oral, Daily      Continuous IV Infusions:     PRN Meds:  acetaminophen, 650 mg, Oral, Q6H PRN  morphine injection, 2 mg, Intravenous, Q4H PRN x4  ondansetron, 4 mg, Intravenous, Q6H PRN        IP CONSULT TO NEPHROLOGY    Network Utilization Review Department  [email or web address]  ATTENTION: Please call with any questions or concerns to [phone] and carefully listen to the prompts so that you are directed to the right person  All voicemails are confidential   Bennie Cornejo all requests for admission clinical reviews, approved or denied determinations and any other requests to dedicated fax number below belonging to the campus where the patient is receiving treatment  List of dedicated fax numbers for the Facilities:  FACILITY NAME UR FAX NUMBER   ADMISSION DENIALS (Administrative/Medical Necessity) [phone]   [address]  (Maternity/NICU/Pediatrics) [phone]   Trey Boots [phone]   Ladean Ros [phone]   Pa Radha [phone]   [address] [phone]   [facility] [address] [phone]     [address] [phone]

## 2020-08-18 NOTE — ASSESSMENT & PLAN NOTE
· Patient is complaining of left-sided flank pain  CT renal stone study is negative for kidney stones  · Noted to have Distended fluid-filled large bowel with a grossly patent sigmoid anastomosis   · Monitor for now

## 2020-08-18 NOTE — PROGRESS NOTES
Chart reviewed:  Noted Readmission:  Pt has chronic issue with hypokalemia on Home Infusions 3 times at week  Was here 7/27-7/30 with hypokalemia  7/13- 15 with hypokalemia    Readmission assessment was done with patient:  She is compliant with all follow ups  Pt notes she does have a appointment to go to the [facility] in October for an evaluation  Gastric Bypass was 2014  Pt started on IV infusions 2018 08/18/20 [address] [facility] [address]   Readmission [facility]   Who directed you to return to the hospital? Family   Did you understand whom to contact if you had questions or problems? Yes   Did you get your prescriptions before you left the hospital? Yes   Were you able to get your prescriptions filled when you left the hospital? Yes   Did you take your medications as prescribed? Yes   Were you able to get to your follow-up appointments? Yes   Patient Information   Mental Status Alert   Primary Caregiver Self   Support System Immediate family   Activities of Daily Living Prior to Admission   Functional Status Independent   Assistive Device No device needed   Living Arrangement House;Lives with someone   [address] [facility] Unemployed   Means of Transportation   Means of Transport to [facility]: Drive Self     Pt resides with her spouse and 2 children 9& 6years old  It is a 1 story home  Pt is I/pta  Pt does drive, does not work  No formal decision maker: patient's  is the health care representative  Has assistance at home from her family  Pharmacy is [facility] in [facility]  No hx of MH or SA  Active with [facility]: Nursing does port changes weekly  Pt self administrates 3 times a week  She has standing lab work weekly or bi weekly  She only sees Dr Destiny Kaplan as she does not have a PCP      CM reviewed d/c planning process including the following: identifying help at home, patient preference for d/c planning needs, availability of treatment team to discuss questions or concerns patient and/or family may have regarding understanding medications and recognizing signs and symptoms once discharged  CM also encouraged patient to follow up with all recommended appointments after discharge  Patient advised of importance for patient and family to participate in managing patients medical well being  Dc plan at this time is home with resumption of [facility]     Will continue to follow

## 2020-08-18 NOTE — UTILIZATION REVIEW
Notification of Inpatient Admission/Inpatient Authorization Request   This is a Notification of Inpatient Admission for [address]  Be advised that this patient was admitted to our facility under Inpatient Status  Contact Luis Longoria at [phone] for additional admission information  [facility] [address] [facility] DEPT  DEDICATED [phone]  Patient Name:   Vega Diggs   YOB: 1985       [address]   [address]:   [address]  Tax ID: [id number]  NPI: [id number] Attending Provider/NPI:  Phone:  Address: Venkata Tinajero Md [[id number]]  [address] [place] [address] Code: 24 Place of Service Name:  [address] [place] [address]   Start Date: 8/17/20 1237     Discharge Date & Time: No discharge date for patient encounter  Type of Admission: Inpatient Status Discharge Disposition (if discharged): Home/Self Care   Patient Diagnoses: Hypokalemia     Orders: Admission Orders (From admission, onward)     Ordered        08/17/20 1344  Inpatient Admission  Once                    Assigned Utilization Review Contact: Luis Longoria  Utilization   Network Utilization Review Department  Phone: [phone]; Fax [phone]  Email: [email or web address]   ATTENTION PAYERS: Please call the assigned Utilization  directly with any questions or concerns ALL voicemails in the department are confidential  Send all requests for admission clinical reviews, approved or denied determinations and any other requests to dedicated fax number belonging to the campus where the patient is receiving treatment

## 2020-08-19 ENCOUNTER — APPOINTMENT (INPATIENT)
Dept: CT IMAGING | Facility: HOSPITAL | Age: 35
DRG: 389 | End: 2020-08-19
Payer: COMMERCIAL

## 2020-08-19 LAB
ANION GAP SERPL CALCULATED.3IONS-SCNC: 11 MMOL/L (ref 4–13)
ANION GAP SERPL CALCULATED.3IONS-SCNC: 11 MMOL/L (ref 4–13)
ATRIAL RATE: 85 BPM
ATRIAL RATE: 86 BPM
BUN SERPL-MCNC: 13 MG/DL (ref 5–25)
BUN SERPL-MCNC: 9 MG/DL (ref 5–25)
CALCIUM SERPL-MCNC: 7.9 MG/DL (ref 8.3–10.1)
CALCIUM SERPL-MCNC: 8 MG/DL (ref 8.3–10.1)
CHLORIDE SERPL-SCNC: 101 MMOL/L (ref 100–108)
CHLORIDE SERPL-SCNC: 104 MMOL/L (ref 100–108)
CO2 SERPL-SCNC: 24 MMOL/L (ref 21–32)
CO2 SERPL-SCNC: 24 MMOL/L (ref 21–32)
CREAT SERPL-MCNC: 0.88 MG/DL (ref 0.6–1.3)
CREAT SERPL-MCNC: 0.94 MG/DL (ref 0.6–1.3)
GFR SERPL CREATININE-BSD FRML MDRD: 78 ML/MIN/1.73SQ M
GFR SERPL CREATININE-BSD FRML MDRD: 85 ML/MIN/1.73SQ M
GLUCOSE SERPL-MCNC: 88 MG/DL (ref 65–140)
GLUCOSE SERPL-MCNC: 91 MG/DL (ref 65–140)
P AXIS: -8 DEGREES
P AXIS: 64 DEGREES
POTASSIUM SERPL-SCNC: 3 MMOL/L (ref 3.5–5.3)
POTASSIUM SERPL-SCNC: 3.2 MMOL/L (ref 3.5–5.3)
PR INTERVAL: 110 MS
PR INTERVAL: 136 MS
QRS AXIS: 11 DEGREES
QRS AXIS: 41 DEGREES
QRSD INTERVAL: 102 MS
QRSD INTERVAL: 106 MS
QT INTERVAL: 376 MS
QT INTERVAL: 382 MS
QTC INTERVAL: 449 MS
QTC INTERVAL: 454 MS
SODIUM SERPL-SCNC: 136 MMOL/L (ref 136–145)
SODIUM SERPL-SCNC: 139 MMOL/L (ref 136–145)
T WAVE AXIS: 69 DEGREES
T WAVE AXIS: 78 DEGREES
VENTRICULAR RATE: 85 BPM
VENTRICULAR RATE: 86 BPM

## 2020-08-19 PROCEDURE — 99232 SBSQ HOSP IP/OBS MODERATE 35: CPT | Performed by: NURSE PRACTITIONER

## 2020-08-19 PROCEDURE — G1004 CDSM NDSC: HCPCS

## 2020-08-19 PROCEDURE — 93010 ELECTROCARDIOGRAM REPORT: CPT | Performed by: INTERNAL MEDICINE

## 2020-08-19 PROCEDURE — 80048 BASIC METABOLIC PNL TOTAL CA: CPT | Performed by: FAMILY MEDICINE

## 2020-08-19 PROCEDURE — 74176 CT ABD & PELVIS W/O CONTRAST: CPT

## 2020-08-19 PROCEDURE — 99232 SBSQ HOSP IP/OBS MODERATE 35: CPT | Performed by: FAMILY MEDICINE

## 2020-08-19 RX ORDER — POTASSIUM CHLORIDE 29.8 MG/ML
40 INJECTION INTRAVENOUS ONCE
Status: DISCONTINUED | OUTPATIENT
Start: 2020-08-19 | End: 2020-08-19 | Stop reason: CLARIF

## 2020-08-19 RX ORDER — POTASSIUM CHLORIDE 29.8 MG/ML
40 INJECTION INTRAVENOUS ONCE
Status: COMPLETED | OUTPATIENT
Start: 2020-08-19 | End: 2020-08-19

## 2020-08-19 RX ORDER — POTASSIUM CHLORIDE 14.9 MG/ML
20 INJECTION INTRAVENOUS ONCE
Status: COMPLETED | OUTPATIENT
Start: 2020-08-19 | End: 2020-08-19

## 2020-08-19 RX ORDER — POTASSIUM CHLORIDE 14.9 MG/ML
20 INJECTION INTRAVENOUS
Status: COMPLETED | OUTPATIENT
Start: 2020-08-19 | End: 2020-08-19

## 2020-08-19 RX ADMIN — THIAMINE HCL TAB 100 MG 100 MG: 100 TAB at 08:25

## 2020-08-19 RX ADMIN — POTASSIUM CHLORIDE 20 MEQ: 14.9 INJECTION, SOLUTION INTRAVENOUS at 19:40

## 2020-08-19 RX ADMIN — POTASSIUM CHLORIDE 40 MEQ: 29.8 INJECTION, SOLUTION INTRAVENOUS at 11:39

## 2020-08-19 RX ADMIN — ZINC SULFATE 220 MG (50 MG) CAPSULE 220 MG: CAPSULE at 08:25

## 2020-08-19 RX ADMIN — Medication 1 CAPSULE: at 16:42

## 2020-08-19 RX ADMIN — POTASSIUM CHLORIDE 60 MEQ: 20 SOLUTION ORAL at 06:22

## 2020-08-19 RX ADMIN — PANTOPRAZOLE SODIUM 40 MG: 40 TABLET, DELAYED RELEASE ORAL at 08:24

## 2020-08-19 RX ADMIN — POTASSIUM CHLORIDE 60 MEQ: 20 SOLUTION ORAL at 13:42

## 2020-08-19 RX ADMIN — POTASSIUM CHLORIDE 20 MEQ: 14.9 INJECTION, SOLUTION INTRAVENOUS at 21:41

## 2020-08-19 RX ADMIN — POTASSIUM CHLORIDE 60 MEQ: 20 SOLUTION ORAL at 19:40

## 2020-08-19 RX ADMIN — MORPHINE SULFATE 2 MG: 2 INJECTION, SOLUTION INTRAMUSCULAR; INTRAVENOUS at 14:47

## 2020-08-19 RX ADMIN — MORPHINE SULFATE 2 MG: 2 INJECTION, SOLUTION INTRAMUSCULAR; INTRAVENOUS at 05:35

## 2020-08-19 RX ADMIN — SUCRALFATE 1 G: 1 TABLET ORAL at 08:25

## 2020-08-19 RX ADMIN — TOPIRAMATE 25 MG: 25 TABLET, FILM COATED ORAL at 08:25

## 2020-08-19 RX ADMIN — AMILORIDE HYDROCHLORIDE 5 MG: 5 TABLET ORAL at 08:28

## 2020-08-19 RX ADMIN — AMITRIPTYLINE HYDROCHLORIDE 50 MG: 25 TABLET, FILM COATED ORAL at 21:41

## 2020-08-19 RX ADMIN — POTASSIUM CHLORIDE 40 MEQ: 29.8 INJECTION, SOLUTION INTRAVENOUS at 08:29

## 2020-08-19 RX ADMIN — FERROUS SULFATE TAB 325 MG (65 MG ELEMENTAL FE) 325 MG: 325 (65 FE) TAB at 08:26

## 2020-08-19 RX ADMIN — TOPIRAMATE 25 MG: 25 TABLET, FILM COATED ORAL at 19:40

## 2020-08-19 RX ADMIN — POTASSIUM CHLORIDE 60 MEQ: 20 SOLUTION ORAL at 11:35

## 2020-08-19 RX ADMIN — MORPHINE SULFATE 2 MG: 2 INJECTION, SOLUTION INTRAMUSCULAR; INTRAVENOUS at 21:40

## 2020-08-19 RX ADMIN — POTASSIUM CHLORIDE 20 MEQ: 14.9 INJECTION, SOLUTION INTRAVENOUS at 16:44

## 2020-08-19 RX ADMIN — POTASSIUM CHLORIDE 60 MEQ: 20 SOLUTION ORAL at 16:42

## 2020-08-19 RX ADMIN — POTASSIUM CHLORIDE 60 MEQ: 20 SOLUTION ORAL at 21:41

## 2020-08-19 NOTE — ASSESSMENT & PLAN NOTE
· Patient is complaining of left-sided flank pain  CT renal stone study is negative for kidney stones  · Noted to have Distended fluid-filled large bowel with a grossly patent sigmoid anastomosis   · Monitor for now  · CT abdomen pelvis done today shows similar findings probably secondary to her gastric bypass surgery  · No further intervention    · Outpatient follow-up with GI for possible EGD and colonoscopy

## 2020-08-19 NOTE — PROGRESS NOTES
Progress Note - Sylvain Brown 1985, 38 y o  female MRN: [id number]    Unit/Bed#: -01 Encounter: [id number]    Primary Care Provider: Star Sanabria DO   Date and time admitted to hospital: 8/17/2020 12:37 PM        * Hypokalemia  Assessment & Plan  · Recurrent severe hypokalemia  ·  follows outpatient with Nephrology   She has been compliant with her KCl 6 times a day and 3 times/week infusions of 80 mEq KCl IV  She had an extensive workup done by nephro for her hypokalemia and now is scheduled to go for research trial in [facility] in October  She previously was evaluated by bariatric surgery who declined to reverse her gastric bypass  · Symptomatic  · Increase her oral KCl to 60 mEq 6 times daily and also placed on 80 mEq of IV KCl supplementation today as her potassium level is 2 1  Magnesium level is normal  · I did a taper of her Topamax last time she is currently on 50 mg p o  Q 12 hours and she has been tolerating she has not been able to see her neurologist so I will taper further to 25 mg p o  Q 12 hours  Repeat potassium is 3 2 this afternoon  This is despite giving so much oral and 80 mEq of IV KCl this morning  Will give another of 60 mEq of IV KCl tonight and recheck BMP tomorrow  Patient will probably require  mEq of IV KCl daily at home    Left flank pain  Assessment & Plan  · Patient is complaining of left-sided flank pain  CT renal stone study is negative for kidney stones  · Noted to have Distended fluid-filled large bowel with a grossly patent sigmoid anastomosis   · Monitor for now  · CT abdomen pelvis done today shows similar findings probably secondary to her gastric bypass surgery  · No further intervention  · Outpatient follow-up with GI for possible EGD and colonoscopy    Normal anion gap metabolic acidosis  Assessment & Plan  · Resolved     · Topamax is being tapered off as well    GERD (gastroesophageal reflux disease)  Assessment & Plan  · Continue PPI    Vitamin B12 deficiency  Assessment & Plan  · She is on vitamin B12 injection monthly    Chronic anemia  Assessment & Plan  · Stable  · Hold iron due to abdominal discomfort    Migraine without aura and without status migrainosus, not intractable  Assessment & Plan  · She still has not seen her neurologist outpatient since last discharge from the hospital   · she has hypokalemia with no suspected nephrolithiasis and also previous non-anion gap metabolic acidosis which are all side effects of Topamax as well  Previously she was unable to be taken off cold turkey secondary to having rebound migraine  Continue gradual taper and take her down to 25 mg of Topamax b i d   · Continue Elavil  · Denies any headache    History of gastric bypass  Assessment & Plan  · Resume her vitamin supplementation      VTE Pharmacologic Prophylaxis:   Pharmacologic: Pharmacologic VTE Prophylaxis contraindicated due to Low risk  Mechanical VTE Prophylaxis in Place: Yes    Patient Centered Rounds: I have performed bedside rounds with nursing staff today  Discussions with Specialists or Other Care Team Provider:  Discussed with Nephrology    Education and Discussions with Family / Patient:  Discussed with patient at bedside    Time Spent for Care: 30 minutes  More than 50% of total time spent on counseling and coordination of care as described above  Current Length of Stay: 2 day(s)    Current Patient Status: Inpatient   Certification Statement: The patient will continue to require additional inpatient hospital stay due to Hypo K    Discharge Plan:  Discharge home tomorrow    Code Status: Level 1 - Full Code      Subjective:   Patient complains of left flank pain which is 4 x 10 intensity    Denies any nausea vomiting or diarrhea but cannot handle anymore higher dose of oral potassium    Objective:     Vitals:   Temp (24hrs), Av 6 °F (37 °C), Min:98 °F (36 7 °C), Max:99 5 °F (37 5 °C)    Temp:  [98 °F (36 7 °C)-99 5 °F (37 5 °C)] 98 °F (36 7 °C)  HR:  [77-92] 86  Resp:  [12-20] 20  BP: (105-118)/(68-80) 118/80  SpO2:  [96 %-100 %] 98 %  There is no height or weight on file to calculate BMI  Input and Output Summary (last 24 hours): Intake/Output Summary (Last 24 hours) at 8/19/2020 1643  Last data filed at 8/19/2020 0818  Gross per 24 hour   Intake 600 ml   Output --   Net 600 ml       Physical Exam:     Physical Exam  Vitals signs and nursing note reviewed  Constitutional:       Appearance: Normal appearance  HENT:      Head: Normocephalic and atraumatic  Right Ear: External ear normal       Left Ear: External ear normal       Nose: Nose normal       Mouth/Throat:      Pharynx: Oropharynx is clear  Neck:      Musculoskeletal: Normal range of motion and neck supple  Cardiovascular:      Rate and Rhythm: Normal rate and regular rhythm  Heart sounds: Normal heart sounds  Pulmonary:      Effort: Pulmonary effort is normal       Breath sounds: Normal breath sounds  Abdominal:      General: Bowel sounds are normal       Palpations: Abdomen is soft  Tenderness: There is no abdominal tenderness  Comments: Left-sided flank tenderness   Musculoskeletal: Normal range of motion  Skin:     General: Skin is warm and dry  Capillary Refill: Capillary refill takes less than 2 seconds  Neurological:      General: No focal deficit present  Mental Status: She is alert and oriented to person, place, and time     Psychiatric:         Mood and Affect: Mood normal            Additional Data:     Labs:    Results from last 7 days   Lab Units 08/17/20  1451   WBC Thousand/uL 8 10   HEMOGLOBIN g/dL 13 5   HEMATOCRIT % 42 1   PLATELETS Thousands/uL 366   NEUTROS PCT % 78*   LYMPHS PCT % 15   MONOS PCT % 5   EOS PCT % 1     Results from last 7 days   Lab Units 08/19/20  1546   SODIUM mmol/L 136   POTASSIUM mmol/L 3 2*   CHLORIDE mmol/L 101   CO2 mmol/L 24   BUN mg/dL 13   CREATININE mg/dL 0  88   ANION GAP mmol/L 11   CALCIUM mg/dL 8 0*   GLUCOSE RANDOM mg/dL 88                           * I Have Reviewed All Lab Data Listed Above  * Additional Pertinent Lab Tests Reviewed: [place] [facility] Admission Reviewed    Imaging:    Imaging Reports Reviewed Today Include:  CT abdomen pelvis  Imaging Personally Reviewed by Myself Includes:  CT abdomen pelvis    Recent Cultures (last 7 days):           Last 24 Hours Medication List:   Current Facility-Administered Medications   Medication Dose Route Frequency Provider Last Rate    acetaminophen  650 mg Oral Q6H PRN Jean Pierre Calvillo MD      AMILoride  5 mg Oral Daily Jean Pierre Calvillo MD      amitriptyline  50 mg Oral HS Jean Pierre Calvillo MD      b complex-vitamin C-folic acid  1 capsule Oral Daily With Dinner Jean Pierre Calvillo MD      ferrous sulfate  325 mg Oral Every Other Day Jean Pierre Calvillo MD      morphine injection  2 mg Intravenous Q4H PRN Jean Pierre Calvillo MD      ondansetron  4 mg Intravenous Q6H PRN Jean Pierre Calvillo MD      pantoprazole  40 mg Oral Daily Jean Pierre Calvillo MD      potassium chloride  60 mEq Oral 6x Daily Edita Vicente MD      potassium chloride  20 mEq Intravenous Q2H Edita Vicente MD      potassium chloride  20 mEq Intravenous Once Edita Vicente MD      sucralfate  1 g Oral Daily Jean Pierre Calvillo MD      thiamine  100 mg Oral Daily Jean Pierre Calvillo MD      topiramate  25 mg Oral BID Jean Pierre Calvillo MD      zinc sulfate  220 mg Oral Daily Jean Pierre Calvillo MD          Today, Patient Was Seen By: Edita Vicente MD    ** Please Note: Dictation voice to text software may have been used in the creation of this document   **

## 2020-08-19 NOTE — DISCHARGE SUMMARY
Discharge- Em [facility] 1985, 38 y o  female MRN: [id number]    Unit/Bed#: -01 Encounter: [id number]    Primary Care Provider: Eboni Chavez DO   Date and time admitted to hospital: 8/17/2020 12:37 PM     Hypokalemia  Assessment & Plan  · Recurrent severe hypokalemia  ·  follows outpatient with Nephrology   She has been compliant with her KCl 40 meq 6 times a day and 3 times/week infusions of 80 mEq KCl IV  She had an extensive workup done by nephro for her hypokalemia and now is scheduled to go for research trial in [facility] in October  She previously was evaluated by bariatric surgery who declined to reverse her gastric bypass  · Symptomatic  · Increase her oral KCl to 60 mEq 6 times daily and also placed on 80 mEq of IV KCl [id number] and 40 meq on tue,thurs,sun   · Magnesium level is normal  · I did a taper of her Topamax   she is currently on 25 mg p o  Q 12 hours  Left flank pain  Assessment & Plan  · Patient is complaining of left-sided flank pain  CT renal stone study is negative for kidney stones  · Noted to have Distended fluid-filled large bowel  · CT abdomen pelvis done shows similar findings   No improvement noted after having several bowel movements as she also had a large stool burden  · Seen by GI and had colonoscopy done today which was normal   Probably has ileus/constipation due to hypokalemia and strictures  · Follow up outpatient with GI    Normal anion gap metabolic acidosis  Assessment & Plan  · Resolved     · Topamax is being tapered off as well    GERD (gastroesophageal reflux disease)  Assessment & Plan  · Continue PPI    Vitamin B12 deficiency  Assessment & Plan  · She is on vitamin B12 injection monthly    Chronic anemia  Assessment & Plan  · Stable  · Hold iron due to abdominal discomfort    Migraine without aura and without status migrainosus, not intractable  Assessment & Plan  · She still has not seen her neurologist outpatient since last discharge from the hospital   · she has hypokalemia with no suspected nephrolithiasis and also previous non-anion gap metabolic acidosis which are all side effects of Topamax as well  Previously she was unable to be taken off cold turkey secondary to having rebound migraine  Continue gradual taper and take her down to 25 mg of Topamax b i d   · Continue Elavil  · Denies any headache    History of gastric bypass  Assessment & Plan  · Resume her vitamin supplementation          Discharging Physician / Practitioner: Davina Hernandez MD  PCP: Mariela Castillo DO  Admission Date:   Admission Orders (From admission, onward)     Ordered        08/17/20 1344  Inpatient Admission  Once                   Discharge Date: 08/21/2020    Resolved Problems  Date Reviewed: 8/19/2020    None          Consultations During Hospital Stay:  · Nephrology    Procedures Performed:   · None    Significant Findings / Test Results:   Ct Abdomen Pelvis Wo Contrast    Result Date: 8/19/2020  Impression: No acute inflammatory stranding Dilated and markedly redundant colon as seen on the previous study with the mild decreased caliber of the sigmoid colon, as seen on the previous study with no worsening No evidence of small bowel obstruction No intra-abdominal fluid collection Workstation performed: [id number]     Ct Renal Stone Study Abdomen Pelvis Wo Contrast    Result Date: 8/17/2020  Impression: 1  Distended fluid-filled large bowel with a grossly patent sigmoid anastomosis as above  No dl obstruction or free air  2   No urinary calculi  Workstation performed: [id number]     Incidental Findings:   · None     Test Results Pending at Discharge (will require follow up): · None     Outpatient Tests Requested:  · Weekly BMP    Complications:  None    Reason for Admission:  Flank pain    Hospital Course:      Regine Buenrostro is a 38 y o  female patient who originally presented to the hospital on 8/17/2020 due to left-sided flank pain and abnormal labs including low potassium  Patient states that she had to do an outpatient test for her GI doctor following which she was not able to take her oral potassium supplements for 24 hours  She presented with low-potassium again 2 3  CT of the abdomen dull due to flank pain and found to have no kidney stones and distended loop of bowel due to redundant colon  History of gastric bypass  No nausea vomiting or diarrhea reported here  Tolerating diet okay  Patient seen by GI due to left-sided abdominal pain  Had colonoscopy done which was normal   Patient probably has ileus/constipation due to recurrent hypokalemia  Her home regimen has been increased to potassium 60 mEq 6 times daily and 80 meq IV KCl every Monday Wednesday Friday and Saturday and 40 mEq IV KCl every Tuesday Thursday and Sunday  BMP twice a week  Follow up outpatient with Nephrology and GI  Please see above list of diagnoses and related plan for additional information  Condition at Discharge: good     Discharge Day Visit / Exam:     Subjective:  Patient denies any chest pain or shortness of breath   Continues to have mild left-sided flank pain  Tolerating oral diet okay  Vitals: Blood Pressure: 105/68 (08/19/20 0724)  Pulse: 77 (08/19/20 0724)  Temperature: 98 3 °F (36 8 °C) (08/19/20 0724)  Temp Source: Oral (08/18/20 2230)  Respirations: 12 (08/19/20 0724)  SpO2: 98 % (08/19/20 0724)  Exam:   Physical Exam  Vitals signs and nursing note reviewed  Constitutional:       Appearance: Normal appearance  HENT:      Head: Normocephalic and atraumatic  Right Ear: External ear normal       Left Ear: External ear normal       Nose: Nose normal       Mouth/Throat:      Pharynx: Oropharynx is clear  Neck:      Musculoskeletal: Normal range of motion and neck supple  Cardiovascular:      Rate and Rhythm: Normal rate and regular rhythm  Heart sounds: Normal heart sounds     Pulmonary:      Effort: Pulmonary effort is normal       Breath sounds: Normal breath sounds  Abdominal:      General: Bowel sounds are normal       Palpations: Abdomen is soft  Tenderness: There is no abdominal tenderness  Comments: Left flank tenderness   Musculoskeletal: Normal range of motion  Skin:     General: Skin is warm and dry  Capillary Refill: Capillary refill takes less than 2 seconds  Neurological:      General: No focal deficit present  Mental Status: She is alert and oriented to person, place, and time  Psychiatric:         Mood and Affect: Mood normal          Discussion with Family:  None    Discharge instructions/Information to patient and family:   See after visit summary for information provided to patient and family  Provisions for Follow-Up Care:  See after visit summary for information related to follow-up care and any pertinent home health orders  Disposition:     Home    For Discharges to [facility]:   · Not Applicable to this Patient - Not Applicable to this Patient    Planned Readmission:  None     Discharge Statement:  I spent 35 minutes discharging the patient  This time was spent on the day of discharge  I had direct contact with the patient on the day of discharge  Greater than 50% of the total time was spent examining patient, answering all patient questions, arranging and discussing plan of care with patient as well as directly providing post-discharge instructions  Additional time then spent on discharge activities  Discharge Medications:  See after visit summary for reconciled discharge medications provided to patient and family        ** Please Note: This note has been constructed using a voice recognition system **

## 2020-08-19 NOTE — PLAN OF CARE
Problem: Potential for Falls  Goal: Patient will remain free of falls  Description: INTERVENTIONS:  - Assess patient frequently for physical needs  -  Identify cognitive and physical deficits and behaviors that affect risk of falls    -  Carson fall precautions as indicated by assessment   - Educate patient/family on patient safety including physical limitations  - Instruct patient to call for assistance with activity based on assessment  - Modify environment to reduce risk of injury  - Consider OT/PT consult to assist with strengthening/mobility  Outcome: Progressing     Problem: PAIN - ADULT  Goal: Verbalizes/displays adequate comfort level or baseline comfort level  Description: Interventions:  - Encourage patient to monitor pain and request assistance  - Assess pain using appropriate pain scale  - Administer analgesics based on type and severity of pain and evaluate response  - Implement non-pharmacological measures as appropriate and evaluate response  - Consider cultural and social influences on pain and pain management  - Notify physician/advanced practitioner if interventions unsuccessful or patient reports new pain  Outcome: Progressing     Problem: INFECTION - ADULT  Goal: Absence or prevention of progression during hospitalization  Description: INTERVENTIONS:  - Assess and monitor for signs and symptoms of infection  - Monitor lab/diagnostic results  - Monitor all insertion sites, i e  indwelling lines, tubes, and drains  - Monitor endotracheal if appropriate and nasal secretions for changes in amount and color  - Carson appropriate cooling/warming therapies per order  - Administer medications as ordered  - Instruct and encourage patient and family to use good hand hygiene technique  - Identify and instruct in appropriate isolation precautions for identified infection/condition  Outcome: Progressing     Problem: SAFETY ADULT  Goal: Patient will remain free of falls  Description: INTERVENTIONS:  - Assess patient frequently for physical needs  -  Identify cognitive and physical deficits and behaviors that affect risk of falls    -  Mil[place] fall precautions as indicated by assessment   - Educate patient/family on patient safety including physical limitations  - Instruct patient to call for assistance with activity based on assessment  - Modify environment to reduce risk of injury  - Consider OT/PT consult to assist with strengthening/mobility  Outcome: Progressing  Goal: Maintain or return to baseline ADL function  Description: INTERVENTIONS:  -  Assess patient's ability to carry out ADLs; assess patient's baseline for ADL function and identify physical deficits which impact ability to perform ADLs (bathing, care of mouth/teeth, toileting, grooming, dressing, etc )  - Assess/evaluate cause of self-care deficits   - Assess range of motion  - Assess patient's mobility; develop plan if impaired  - Assess patient's need for assistive devices and provide as appropriate  - Encourage maximum independence but intervene and supervise when necessary  - Involve family in performance of ADLs  - Assess for home care needs following discharge   - Consider OT consult to assist with ADL evaluation and planning for discharge  - Provide patient education as appropriate  Outcome: Progressing     Problem: DISCHARGE PLANNING  Goal: Discharge to home or other facility with appropriate resources  Description: INTERVENTIONS:  - Identify barriers to discharge w/patient and caregiver  - Arrange for needed discharge resources and transportation as appropriate  - Identify discharge learning needs (meds, wound care, etc )  - Arrange for interpretive services to assist at discharge as needed  - Refer to Case Management Department for coordinating discharge planning if the patient needs post-hospital services based on physician/advanced practitioner order or complex needs related to functional status, cognitive ability, or social support system  Outcome: Progressing     Problem: Knowledge Deficit  Goal: Patient/family/caregiver demonstrates understanding of disease process, treatment plan, medications, and discharge instructions  Description: Complete learning assessment and assess knowledge base    Interventions:  - Provide teaching at level of understanding  - Provide teaching via preferred learning methods  Outcome: Progressing     Problem: CARDIOVASCULAR - ADULT  Goal: Maintains optimal cardiac output and hemodynamic stability  Description: INTERVENTIONS:  - Monitor I/O, vital signs and rhythm  - Monitor for S/S and trends of decreased cardiac output  - Administer and titrate ordered vasoactive medications to optimize hemodynamic stability  - Assess quality of pulses, skin color and temperature  - Assess for signs of decreased coronary artery perfusion  - Instruct patient to report change in severity of symptoms  Outcome: Progressing  Goal: Absence of cardiac dysrhythmias or at baseline rhythm  Description: INTERVENTIONS:  - Continuous cardiac monitoring, vital signs, obtain 12 lead EKG if ordered  - Administer antiarrhythmic and heart rate control medications as ordered  - Monitor electrolytes and administer replacement therapy as ordered  Outcome: Progressing     Problem: METABOLIC, FLUID AND ELECTROLYTES - ADULT  Goal: Electrolytes maintained within normal limits  Description: INTERVENTIONS:  - Monitor labs and assess patient for signs and symptoms of electrolyte imbalances  - Administer electrolyte replacement as ordered  - Monitor response to electrolyte replacements, including repeat lab results as appropriate  - Instruct patient on fluid and nutrition as appropriate  Outcome: Progressing  Goal: Fluid balance maintained  Description: INTERVENTIONS:  - Monitor labs   - Monitor I/O and WT  - Instruct patient on fluid and nutrition as appropriate  - Assess for signs & symptoms of volume excess or deficit  Outcome: Progressing

## 2020-08-19 NOTE — PLAN OF CARE
Problem: Potential for Falls  Goal: Patient will remain free of falls  Description: INTERVENTIONS:  - Assess patient frequently for physical needs  -  Identify cognitive and physical deficits and behaviors that affect risk of falls    -  [place] fall precautions as indicated by assessment   - Educate patient/family on patient safety including physical limitations  - Instruct patient to call for assistance with activity based on assessment  - Modify environment to reduce risk of injury  - Consider OT/PT consult to assist with strengthening/mobility  Outcome: Progressing     Problem: PAIN - ADULT  Goal: Verbalizes/displays adequate comfort level or baseline comfort level  Description: Interventions:  - Encourage patient to monitor pain and request assistance  - Assess pain using appropriate pain scale  - Administer analgesics based on type and severity of pain and evaluate response  - Implement non-pharmacological measures as appropriate and evaluate response  - Consider cultural and social influences on pain and pain management  - Notify physician/advanced practitioner if interventions unsuccessful or patient reports new pain  Outcome: Progressing     Problem: INFECTION - ADULT  Goal: Absence or prevention of progression during hospitalization  Description: INTERVENTIONS:  - Assess and monitor for signs and symptoms of infection  - Monitor lab/diagnostic results  - Monitor all insertion sites, i e  indwelling lines, tubes, and drains  - Monitor endotracheal if appropriate and nasal secretions for changes in amount and color  - [place] appropriate cooling/warming therapies per order  - Administer medications as ordered  - Instruct and encourage patient and family to use good hand hygiene technique  - Identify and instruct in appropriate isolation precautions for identified infection/condition  Outcome: Progressing     Problem: SAFETY ADULT  Goal: Patient will remain free of falls  Description: INTERVENTIONS:  - Assess patient frequently for physical needs  -  Identify cognitive and physical deficits and behaviors that affect risk of falls    -  [place] fall precautions as indicated by assessment   - Educate patient/family on patient safety including physical limitations  - Instruct patient to call for assistance with activity based on assessment  - Modify environment to reduce risk of injury  - Consider OT/PT consult to assist with strengthening/mobility  Outcome: Progressing  Goal: Maintain or return to baseline ADL function  Description: INTERVENTIONS:  -  Assess patient's ability to carry out ADLs; assess patient's baseline for ADL function and identify physical deficits which impact ability to perform ADLs (bathing, care of mouth/teeth, toileting, grooming, dressing, etc )  - Assess/evaluate cause of self-care deficits   - Assess range of motion  - Assess patient's mobility; develop plan if impaired  - Assess patient's need for assistive devices and provide as appropriate  - Encourage maximum independence but intervene and supervise when necessary  - Involve family in performance of ADLs  - Assess for home care needs following discharge   - Consider OT consult to assist with ADL evaluation and planning for discharge  - Provide patient education as appropriate  Outcome: Progressing     Problem: DISCHARGE PLANNING  Goal: Discharge to home or other facility with appropriate resources  Description: INTERVENTIONS:  - Identify barriers to discharge w/patient and caregiver  - Arrange for needed discharge resources and transportation as appropriate  - Identify discharge learning needs (meds, wound care, etc )  - Arrange for interpretive services to assist at discharge as needed  - Refer to Case Management Department for coordinating discharge planning if the patient needs post-hospital services based on physician/advanced practitioner order or complex needs related to functional status, cognitive ability, or social support system  Outcome: Progressing     Problem: Knowledge Deficit  Goal: Patient/family/caregiver demonstrates understanding of disease process, treatment plan, medications, and discharge instructions  Description: Complete learning assessment and assess knowledge base    Interventions:  - Provide teaching at level of understanding  - Provide teaching via preferred learning methods  Outcome: Progressing     Problem: CARDIOVASCULAR - ADULT  Goal: Maintains optimal cardiac output and hemodynamic stability  Description: INTERVENTIONS:  - Monitor I/O, vital signs and rhythm  - Monitor for S/S and trends of decreased cardiac output  - Administer and titrate ordered vasoactive medications to optimize hemodynamic stability  - Assess quality of pulses, skin color and temperature  - Assess for signs of decreased coronary artery perfusion  - Instruct patient to report change in severity of symptoms  Outcome: Progressing  Goal: Absence of cardiac dysrhythmias or at baseline rhythm  Description: INTERVENTIONS:  - Continuous cardiac monitoring, vital signs, obtain 12 lead EKG if ordered  - Administer antiarrhythmic and heart rate control medications as ordered  - Monitor electrolytes and administer replacement therapy as ordered  Outcome: Progressing     Problem: METABOLIC, FLUID AND ELECTROLYTES - ADULT  Goal: Electrolytes maintained within normal limits  Description: INTERVENTIONS:  - Monitor labs and assess patient for signs and symptoms of electrolyte imbalances  - Administer electrolyte replacement as ordered  - Monitor response to electrolyte replacements, including repeat lab results as appropriate  - Instruct patient on fluid and nutrition as appropriate  Outcome: Progressing  Goal: Fluid balance maintained  Description: INTERVENTIONS:  - Monitor labs   - Monitor I/O and WT  - Instruct patient on fluid and nutrition as appropriate  - Assess for signs & symptoms of volume excess or deficit  Outcome: Progressing

## 2020-08-19 NOTE — PROGRESS NOTES
NEPHROLOGY PROGRESS NOTE   La Jones 38 y o  female MRN: [id number]  Unit/Bed#: -01 Encounter: [id number]    Assessment/Plan:    38year old female well known to this service for chronic hypokalemia receives oral supplementation and IV infusions in outpatient setting  Admitted  with left flank pain and symptomatic hypokalemia  Oral potassium elixir increased to 60 mEq 6 times daily by primary team   She will receive a total of 80 mEq IV potassium and a BMP will be checked around 4:00 p m     1  Acute on chronic symptomatic hypokalemia  · Potassium chloride elixir increased to 60 mEq 6 times daily and is receiving 80 mEq IV potassium today  BMP will be recheck later today  Topamax was weaned last admission  She is also receiving a potassium-sparing diuretic  · She will be further evaluated by the [facility] later this year and she is receiving 2nd opinion from Gastroenterology regarding potential GI source of hypokalemia  2  Anion gap metabolic acidosis-resolved  3  Acute kidney injury (POA)-resolved  4  Left flank pain  · No nephrolithiasis seen on imaging  Continue care according to hospitalist  5  Status post Roshan-en-Y gastric bypass  · Again, is being seen for 2nd opinion by Gastroenterology    ROS  Seen and examined sitting in bed  States she still has some paresthesias that she associates with low potassium level  A complete 10 point review of systems have been performed and are otherwise negative         Historical Information   Past Medical History:   Diagnosis Date    H  pylori infection     Hypokalemia     Migraine     Renal disorder     Rhabdomyolysis      Past Surgical History:   Procedure Laterality Date    ABDOMINAL SURGERY      APPENDECTOMY       SECTION      CHOLECYSTECTOMY      COSMETIC SURGERY      FL GUIDED CENTRAL VENOUS ACCESS DEVICE INSERTION  2018    GASTRIC BYPASS      HYSTERECTOMY      TUNNELED VENOUS PORT PLACEMENT N/A 2018 Procedure: INSERTION VENOUS PORT (PORT-A-CATH); Surgeon: Giulia Ballard DO;  Location: [facility];  Service: General     Social History   Social History     Substance and Sexual Activity   Alcohol Use Never    Alcohol/week: 0 0 standard drinks    Frequency: Never    Drinks per session: Patient refused    Binge frequency: Never    Comment: 0     Social History     Substance and Sexual Activity   Drug Use Never     Social History     Tobacco Use   Smoking Status Never Smoker   Smokeless Tobacco Never Used       Family History:   Family History   Problem Relation Age of Onset    No Known Problems Mother     Hypertension Father     Diabetes Father     Diabetes Maternal Grandfather        Medications:  Pertinent medications were reviewed  Current Facility-Administered Medications   Medication Dose Route Frequency Provider Last Rate    acetaminophen  650 mg Oral Q6H PRN Markus Franks MD      AMILoride  5 mg Oral Daily Markus Franks MD      amitriptyline  50 mg Oral HS Markus Franks MD      b complex-vitamin C-folic acid  1 capsule Oral Daily With Dinner Markus Franks MD      ferrous sulfate  325 mg Oral Every Other Day Markus Franks MD      morphine injection  2 mg Intravenous Q4H PRN Markus Franks MD      ondansetron  4 mg Intravenous Q6H PRN Markus Franks MD      pantoprazole  40 mg Oral Daily Markus Franks MD      potassium chloride  60 mEq Oral 6x Daily Venkata Tinajero MD      potassium chloride  40 mEq Intravenous Once Venkata Tinajero MD 40 mEq (08/19/20 1139)    sucralfate  1 g Oral Daily Markus Franks MD      thiamine  100 mg Oral Daily Markus Franks MD      topiramate  25 mg Oral BID Markus Franks MD      zinc sulfate  220 mg Oral Daily Markus Franks MD           Allergies   Allergen Reactions    Nsaids Other (See Comments)     Other reaction(s): Other (Please comment)  Should not take s/p bariatric surgery  Other reaction(s):  Other (See Comments)  Should not take as per prior records  Should not take s/p bariatric surgery  Has hx of gastric bypass      Prednisone      Nausea, vomiting, diarrhea         Vitals:   /68   Pulse 77   Temp 98 3 °F (36 8 °C)   Resp 12   SpO2 98%   There is no height or weight on file to calculate BMI  SpO2: 98 %,   SpO2 Activity: At Rest,   O2 Device: None (Room air)      Intake/Output Summary (Last 24 hours) at 8/19/2020 1352  Last data filed at 8/19/2020 0818  Gross per 24 hour   Intake 960 ml   Output --   Net 960 ml     Invasive Devices     Central Venous Catheter Line            Port A Cath 12/22/18 Right Chest 606 days                Physical Exam  General: conscious, cooperative, in no acute distress  Eyes: conjunctivae pink, anicteric sclerae  ENT: lips and mucous membranes moist  Neck: supple, no JVD, no masses  Chest: clear breath sounds bilateral, no crackles, ronchus or wheezings  CVS: S1 & S2, normal rate, regular rhythm  Abdomen: soft, non-tender, non-distended, normoactive bowel sounds  Extremities: no edema of both legs  Skin: no rash, pale  Neuro: awake, alert, oriented      Diagnostic Data:  Lab: I have personally reviewed pertinent lab results  ,   CBC:  Results from last 7 days   Lab Units 08/17/20  1451   WBC Thousand/uL 8 10   HEMOGLOBIN g/dL 13 5   HEMATOCRIT % 42 1   PLATELETS Thousands/uL 366      CMP:   Lab Results   Component Value Date    SODIUM 139 08/19/2020    K 3 0 (L) 08/19/2020     08/19/2020    CO2 24 08/19/2020    BUN 9 08/19/2020    CREATININE 0 94 08/19/2020    CALCIUM 7 9 (L) 08/19/2020    EGFR 78 08/19/2020   ,   PT/INR: No results found for: PT, INR,   Magnesium: No components found for: MAG,  Phosphorous: No results found for: PHOS    Microbiology:  @LABRCNTIP,(urinecx:7)@        [facility] [address]    Portions of the record may have been created with voice recognition software   Occasional wrong word or "sound a like" substitutions may have occurred due to the inherent limitations of voice recognition software  Read the chart carefully and recognize, using context, where substitutions have occurred

## 2020-08-19 NOTE — ASSESSMENT & PLAN NOTE
· Recurrent severe hypokalemia  ·  follows outpatient with Nephrology   She has been compliant with her KCl 6 times a day and 3 times/week infusions of 80 mEq KCl IV  She had an extensive workup done by nephro for her hypokalemia and now is scheduled to go for research trial in [facility] [employer] 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  · Symptomatic  · Increase her oral KCl to 60 mEq 6 times daily and also placed on 80 mEq of IV KCl supplementation today as her potassium level is 2 1  Magnesium level is normal  · I did a taper of her Topamax last time she is currently on 50 mg p o  Q 12 hours and she has been tolerating she has not been able to see her neurologist so I will taper further to 25 mg p o  Q 12 hours  Repeat potassium is 3 2 this afternoon  This is despite giving so much oral and 80 mEq of IV KCl this morning  Will give another of 60 mEq of IV KCl tonight and recheck BMP tomorrow    Patient will probably require  mEq of IV KCl daily at home

## 2020-08-20 PROBLEM — R10.32 LEFT LOWER QUADRANT ABDOMINAL PAIN: Status: ACTIVE | Noted: 2020-08-17

## 2020-08-20 LAB
ANION GAP SERPL CALCULATED.3IONS-SCNC: 10 MMOL/L (ref 4–13)
ANION GAP SERPL CALCULATED.3IONS-SCNC: 10 MMOL/L (ref 4–13)
ANION GAP SERPL CALCULATED.3IONS-SCNC: 8 MMOL/L (ref 4–13)
ANION GAP SERPL CALCULATED.3IONS-SCNC: 9 MMOL/L (ref 4–13)
BUN SERPL-MCNC: 13 MG/DL (ref 5–25)
BUN SERPL-MCNC: 13 MG/DL (ref 5–25)
BUN SERPL-MCNC: 14 MG/DL (ref 5–25)
BUN SERPL-MCNC: 17 MG/DL (ref 5–25)
CALCIUM SERPL-MCNC: 7.9 MG/DL (ref 8.3–10.1)
CALCIUM SERPL-MCNC: 8.1 MG/DL (ref 8.3–10.1)
CALCIUM SERPL-MCNC: 8.4 MG/DL (ref 8.3–10.1)
CALCIUM SERPL-MCNC: 8.9 MG/DL (ref 8.3–10.1)
CHLORIDE SERPL-SCNC: 103 MMOL/L (ref 100–108)
CHLORIDE SERPL-SCNC: 106 MMOL/L (ref 100–108)
CHLORIDE SERPL-SCNC: 106 MMOL/L (ref 100–108)
CHLORIDE SERPL-SCNC: 107 MMOL/L (ref 100–108)
CK SERPL-CCNC: 47 U/L (ref 26–192)
CO2 SERPL-SCNC: 19 MMOL/L (ref 21–32)
CO2 SERPL-SCNC: 19 MMOL/L (ref 21–32)
CO2 SERPL-SCNC: 22 MMOL/L (ref 21–32)
CO2 SERPL-SCNC: 23 MMOL/L (ref 21–32)
CREAT SERPL-MCNC: 0.95 MG/DL (ref 0.6–1.3)
CREAT SERPL-MCNC: 1.08 MG/DL (ref 0.6–1.3)
CREAT SERPL-MCNC: 1.19 MG/DL (ref 0.6–1.3)
CREAT SERPL-MCNC: 1.23 MG/DL (ref 0.6–1.3)
GFR SERPL CREATININE-BSD FRML MDRD: 57 ML/MIN/1.73SQ M
GFR SERPL CREATININE-BSD FRML MDRD: 59 ML/MIN/1.73SQ M
GFR SERPL CREATININE-BSD FRML MDRD: 66 ML/MIN/1.73SQ M
GFR SERPL CREATININE-BSD FRML MDRD: 77 ML/MIN/1.73SQ M
GLUCOSE SERPL-MCNC: 147 MG/DL (ref 65–140)
GLUCOSE SERPL-MCNC: 82 MG/DL (ref 65–140)
GLUCOSE SERPL-MCNC: 83 MG/DL (ref 65–140)
GLUCOSE SERPL-MCNC: 89 MG/DL (ref 65–140)
POTASSIUM SERPL-SCNC: 3.4 MMOL/L (ref 3.5–5.3)
POTASSIUM SERPL-SCNC: 4 MMOL/L (ref 3.5–5.3)
POTASSIUM SERPL-SCNC: 5.8 MMOL/L (ref 3.5–5.3)
POTASSIUM SERPL-SCNC: 6.5 MMOL/L (ref 3.5–5.3)
SODIUM SERPL-SCNC: 134 MMOL/L (ref 136–145)
SODIUM SERPL-SCNC: 135 MMOL/L (ref 136–145)
SODIUM SERPL-SCNC: 135 MMOL/L (ref 136–145)
SODIUM SERPL-SCNC: 138 MMOL/L (ref 136–145)

## 2020-08-20 PROCEDURE — 80048 BASIC METABOLIC PNL TOTAL CA: CPT | Performed by: FAMILY MEDICINE

## 2020-08-20 PROCEDURE — 99232 SBSQ HOSP IP/OBS MODERATE 35: CPT | Performed by: FAMILY MEDICINE

## 2020-08-20 PROCEDURE — ND001 PR NO DOCUMENTATION: Performed by: INTERNAL MEDICINE

## 2020-08-20 PROCEDURE — 82550 ASSAY OF CK (CPK): CPT | Performed by: FAMILY MEDICINE

## 2020-08-20 RX ORDER — POTASSIUM CHLORIDE 14.9 MG/ML
20 INJECTION INTRAVENOUS ONCE
Status: COMPLETED | OUTPATIENT
Start: 2020-08-20 | End: 2020-08-20

## 2020-08-20 RX ORDER — POTASSIUM CHLORIDE 20 MEQ/1
60 TABLET, EXTENDED RELEASE ORAL
Status: DISCONTINUED | OUTPATIENT
Start: 2020-08-20 | End: 2020-08-21 | Stop reason: HOSPADM

## 2020-08-20 RX ORDER — POTASSIUM CHLORIDE 29.8 MG/ML
40 INJECTION INTRAVENOUS ONCE
Status: COMPLETED | OUTPATIENT
Start: 2020-08-20 | End: 2020-08-20

## 2020-08-20 RX ORDER — POLYETHYLENE GLYCOL 3350 17 G/17G
17 POWDER, FOR SOLUTION ORAL ONCE
Status: COMPLETED | OUTPATIENT
Start: 2020-08-20 | End: 2020-08-20

## 2020-08-20 RX ORDER — POLYETHYLENE GLYCOL 3350 17 G/17G
17 POWDER, FOR SOLUTION ORAL DAILY
Status: DISCONTINUED | OUTPATIENT
Start: 2020-08-20 | End: 2020-08-20

## 2020-08-20 RX ORDER — BISACODYL 10 MG
10 SUPPOSITORY, RECTAL RECTAL DAILY
Status: DISCONTINUED | OUTPATIENT
Start: 2020-08-20 | End: 2020-08-21 | Stop reason: HOSPADM

## 2020-08-20 RX ADMIN — BISACODYL 10 MG: 10 SUPPOSITORY RECTAL at 09:01

## 2020-08-20 RX ADMIN — MORPHINE SULFATE 2 MG: 2 INJECTION, SOLUTION INTRAMUSCULAR; INTRAVENOUS at 22:10

## 2020-08-20 RX ADMIN — POTASSIUM CHLORIDE 60 MEQ: 20 SOLUTION ORAL at 06:03

## 2020-08-20 RX ADMIN — SUCRALFATE 1 G: 1 TABLET ORAL at 08:24

## 2020-08-20 RX ADMIN — TOPIRAMATE 25 MG: 25 TABLET, FILM COATED ORAL at 17:54

## 2020-08-20 RX ADMIN — POTASSIUM CHLORIDE 60 MEQ: 20 SOLUTION ORAL at 13:27

## 2020-08-20 RX ADMIN — POLYETHYLENE GLYCOL 3350, SODIUM SULFATE ANHYDROUS, SODIUM BICARBONATE, SODIUM CHLORIDE, POTASSIUM CHLORIDE 4000 ML: 236; 22.74; 6.74; 5.86; 2.97 POWDER, FOR SOLUTION ORAL at 15:56

## 2020-08-20 RX ADMIN — TOPIRAMATE 25 MG: 25 TABLET, FILM COATED ORAL at 08:24

## 2020-08-20 RX ADMIN — THIAMINE HCL TAB 100 MG 100 MG: 100 TAB at 08:24

## 2020-08-20 RX ADMIN — POLYETHYLENE GLYCOL 3350 17 G: 17 POWDER, FOR SOLUTION ORAL at 10:35

## 2020-08-20 RX ADMIN — AMILORIDE HYDROCHLORIDE 5 MG: 5 TABLET ORAL at 08:24

## 2020-08-20 RX ADMIN — Medication 1 CAPSULE: at 15:56

## 2020-08-20 RX ADMIN — POTASSIUM CHLORIDE 40 MEQ: 29.8 INJECTION, SOLUTION INTRAVENOUS at 08:24

## 2020-08-20 RX ADMIN — MORPHINE SULFATE 2 MG: 2 INJECTION, SOLUTION INTRAMUSCULAR; INTRAVENOUS at 13:27

## 2020-08-20 RX ADMIN — MORPHINE SULFATE 2 MG: 2 INJECTION, SOLUTION INTRAMUSCULAR; INTRAVENOUS at 06:03

## 2020-08-20 RX ADMIN — POTASSIUM CHLORIDE 60 MEQ: 20 SOLUTION ORAL at 10:35

## 2020-08-20 RX ADMIN — ZINC SULFATE 220 MG (50 MG) CAPSULE 220 MG: CAPSULE at 08:24

## 2020-08-20 RX ADMIN — POTASSIUM CHLORIDE 20 MEQ: 14.9 INJECTION, SOLUTION INTRAVENOUS at 11:56

## 2020-08-20 RX ADMIN — POTASSIUM CHLORIDE 60 MEQ: 1500 TABLET, EXTENDED RELEASE ORAL at 21:21

## 2020-08-20 RX ADMIN — POLYETHYLENE GLYCOL 3350 17 G: 17 POWDER, FOR SOLUTION ORAL at 09:01

## 2020-08-20 RX ADMIN — PANTOPRAZOLE SODIUM 40 MG: 40 TABLET, DELAYED RELEASE ORAL at 08:24

## 2020-08-20 RX ADMIN — AMITRIPTYLINE HYDROCHLORIDE 50 MG: 25 TABLET, FILM COATED ORAL at 21:21

## 2020-08-20 NOTE — PROGRESS NOTES
Progress Note - Nephrology   Denita Tyler 38 y o  female MRN: [id number]  Unit/Bed#: -01 Encounter: [id number]  Assessment/Plan:     38year old female well known to this service for chronic hypokalemia receives oral supplementation and IV infusions in outpatient setting  Admitted 08/17 with left flank pain and symptomatic hypokalemia  Oral potassium elixir increased to 60 mEq 6 times daily by primary team   She will receive a total of 80 mEq IV potassium and a BMP will be checked around 4:00 p m      1  Acute on chronic symptomatic hypokalemia  Potassium chloride elixir increased to 60 mEq 6 times daily and received total 120 mEq IV potassium yesterday and 40 meq IV today  K this am 3 4  Topamax was weaned last admission  She is also receiving a potassium-sparing diuretic [employer] channel blocker  She will be further evaluated by the [facility] clinic later this year and she is receiving 2nd opinion from Gastroenterology regarding potential GI source of hypokalemia    I agree with Primary attending she needs more IV K supplement at home -- 80 meq IV daily and po supplement as she was taking prior to coming to hospital  Although po absorption is not reliable specially in setting of remarkable ileus seen on gut imaging  2  Anion gap metabolic acidosis-resolved    3  Acute kidney injury (POA)-resolved    4  Left flank pain  No nephrolithiasis seen on imaging  Continue care according to primary attending   Noted  CT abdomen showing dilated large gut likely ileus and distal bowel impacted with stool  May benefit from aggressive bowel cleaning by laxative  CPK will be also checked to determine if any muscle breakdown is the reason behind the tenderness on left trunk muscle  5  Status post Roshan-en-Y gastric bypass  Again, is being seen for 2nd opinion by Gastroenterology     I spoke to primary attending at length and discussed about the management        Follow up reason for today's visit:  Hypokalemia , TICO Hypokalemia    Patient Active Problem List   Diagnosis    Hypokalemia    History of gastric bypass    Migraine without aura and without status migrainosus, not intractable    Left-sided weakness    Hypophosphatemia    Chronic anemia    Vitamin D deficiency    Elevated CPK    Vitamin B12 deficiency    Cervical lymphadenopathy    Fatigue    Paresthesia of both lower extremities    Paresthesias    B12 deficiency    Gastric bypass status for obesity    GERD (gastroesophageal reflux disease)    Migraine    WAGNER (obstructive sleep apnea)    Other intestinal malabsorption    Acute kidney injury ([address] )    Right ovarian cyst    Chest pain    Nausea & vomiting    Vertigo    Stress fracture of right foot with routine healing    Normal anion gap metabolic acidosis    Middle ear effusion    Left flank pain         Subjective:     Pain left abdominal pain and flank pain , concerns about going home with pain    Objective:     Vitals: Blood pressure 97/74, pulse 82, temperature 98 4 °F (36 9 °C), resp  rate 14, SpO2 100 %  ,There is no height or weight on file to calculate BMI  Weight (last 2 days)     None          No intake or output data in the 24 hours ending 08/20/20 0959  I/O last 3 completed shifts:   In: 240 [P O :240]  Out: -          Physical Exam: BP 97/74   Pulse 82   Temp 98 4 °F (36 9 °C)   Resp 14   SpO2 100%     General Appearance:    Alert, cooperative, no distress, appears stated age   Head:    Normocephalic, without obvious abnormality, atraumatic   Eyes:    Conjunctiva/corneas clear   Ears:    Normal external ears   Nose:   Nares normal, septum midline, mucosa normal, no drainage    or sinus tenderness   Throat:   Lips, mucosa, and tongue normal; teeth and gums normal, Port rt chest wall C/D/I   Neck:   Supple, symmetrical, trachea midline, no adenopathy;        thyroid:  No enlargement/tenderness/nodules; no carotid    bruit or JVD   Back:     Symmetric, no curvature, ROM normal, no CVA tenderness   Lungs:     Clear to auscultation bilaterally, respirations unlabored   Chest wall:    No tenderness or deformity   Heart:    Regular rate and rhythm, S1 and S2 normal, no murmur, rub   or gallop   Abdomen:     Soft, non-tender, bowel sounds active, mild tenderness left upper quadrant and left flank muscle , no RT  Extremities:   Extremities normal, atraumatic, no cyanosis or edema   Skin:   Skin color, texture, turgor normal, no rashes or lesions   Lymph nodes:   Cervical normal   Neurologic:   CNII-XII intact            Lab, Imaging and other studies: I have personally reviewed pertinent labs  CBC: No results found for: WBC, HGB, HCT, MCV, PLT, ADJUSTEDWBC, MCH, MCHC, RDW, MPV, NRBC  CMP:   Lab Results   Component Value Date    K 3 4 (L) 08/20/2020     08/20/2020    CO2 23 08/20/2020    BUN 13 08/20/2020    CREATININE 0 95 08/20/2020    CALCIUM 8 1 (L) 08/20/2020    EGFR 77 08/20/2020         Results from last 7 days   Lab Units 08/20/20  0614 08/19/20  1546 08/19/20  0531   POTASSIUM mmol/L 3 4* 3 2* 3 0*   CHLORIDE mmol/L 106 101 104   CO2 mmol/L 23 24 24   BUN mg/dL 13 13 9   CREATININE mg/dL 0 95 0 88 0 94   CALCIUM mg/dL 8 1* 8 0* 7 9*         Phosphorus: No results found for: PHOS  Magnesium: No results found for: MG  Urinalysis: No results found for: Andressa Clarice, SPECGRAV, PHUR, LEUKOCYTESUR, NITRITE, PROTEINUA, GLUCOSEU, KETONESU, BILIRUBINUR, BLOODU  Ionized Calcium: No results found for: CAION  Coagulation: No results found for: PT, INR, APTT  Troponin: No results found for: TROPONINI  ABG: No results found for: PHART, UTN2AJQ, PO2ART, PGK4FCY, F5DLXYYW, BEART, SOURCE  Radiology review:     IMAGING  Procedure: Ct Abdomen Pelvis Wo Contrast    Result Date: 8/19/2020  Narrative: CT ABDOMEN AND PELVIS WITHOUT IV CONTRAST INDICATION:   LLQ abdominal pain, diverticulitis suspected   COMPARISON:  August 19, 2020 TECHNIQUE:  CT examination of the abdomen and pelvis was performed without intravenous contrast   Axial, sagittal, and coronal 2D reformatted images were created from the source data and submitted for interpretation  Radiation dose length product (DLP) for this visit:  441 mGy-cm   This examination, like all CT scans performed in the [facility], was performed utilizing techniques to minimize radiation dose exposure, including the use of iterative reconstruction and automated exposure control  Enteric contrast was administered  FINDINGS: ABDOMEN LOWER CHEST:  No clinically significant abnormality identified in the visualized lower chest  LIVER/BILIARY TREE:  Evaluation limited due to lack of contrast GALLBLADDER:  Surgically absent SPLEEN:  Unremarkable  PANCREAS:  Unremarkable  ADRENAL GLANDS:  Unremarkable  KIDNEYS/URETERS:  Unremarkable  No hydronephrosis  STOMACH AND BOWEL:  Again noted is the distention of the colon involving the ascending colon, transverse colon, descending colon The colon is markedly redundant The sigmoid colon is mildly decompressed or fluid and air is noted within the rectum Contrast is seen opacifying the small bowel Postsurgical changes from gastric bypass are noted There is opacification of the Roshan limb beyond the distal anastomosis is lying on the right side in the mid abdomen No abnormal dilation of small bowel loops seen APPENDIX:  No findings to suggest appendicitis  ABDOMINOPELVIC CAVITY:  No ascites  No pneumoperitoneum  No lymphadenopathy  VESSELS:  , Suboptimally evaluated PELVIS REPRODUCTIVE ORGANS:  No pelvic adnexal mass seen  URINARY BLADDER:  Unremarkable  ABDOMINAL WALL/INGUINAL REGIONS:  Unremarkable  OSSEOUS STRUCTURES:  No acute fracture or destructive osseous lesion       Impression: No acute inflammatory stranding Dilated and markedly redundant colon as seen on the previous study with the mild decreased caliber of the sigmoid colon, as seen on the previous study with no worsening No evidence of small bowel obstruction No intra-abdominal fluid collection Workstation performed: [id number]     Procedure: Ct Renal Stone Study Abdomen Pelvis Wo Contrast    Result Date: 8/17/2020  Narrative: CT ABDOMEN AND PELVIS WITHOUT IV CONTRAST - LOW DOSE RENAL STONE INDICATION:   Flank pain, kidney stone suspected  7/30/2018 COMPARISON:  None  TECHNIQUE:  Low dose thin section CT examination of the abdomen and pelvis was performed without intravenous or oral contrast according to a protocol specifically designed to evaluate for urinary tract calculus  Axial, sagittal, and coronal 2D reformatted images were created from the source data and submitted for interpretation  Evaluation for pathology in the abdomen and pelvis that is unrelated to urinary tract calculi is limited  Radiation dose length product (DLP) for this visit:  278 mGy-cm   This examination, like all CT scans performed in the [facility], was performed utilizing techniques to minimize radiation dose exposure, including the use of iterative reconstruction and automated exposure control  FINDINGS: RIGHT KIDNEY AND URETER: No urinary tract calculi  No hydronephrosis or hydroureter  LEFT KIDNEY AND URETER: No urinary tract calculi  No hydronephrosis or hydroureter  URINARY BLADDER: Unremarkable  No significant abnormality in the visualized lung bases  Limited low radiation dose noncontrast CT evaluation demonstrates no clinically significant abnormality of liver, spleen, pancreas, or adrenal glands  The gallbladder is surgically absent  No ascites or bulky lymphadenopathy on this limited noncontrast study  Patient is status post gastric bypass surgery  Distended loops of large bowel appears fluid-filled with postoperative change at the distal sigmoid  No dl obstruction  Limited evaluation demonstrates no evidence to suggest acute appendicitis  No acute fracture or destructive osseous lesion is identified  Impression: 1    Distended fluid-filled large bowel with a grossly patent sigmoid anastomosis as above  No dl obstruction or free air  2   No urinary calculi   Workstation performed: [id number]       Current Facility-Administered Medications   Medication Dose Route Frequency    acetaminophen (TYLENOL) tablet 650 mg  650 mg Oral Q6H PRN    AMILoride tablet 5 mg  5 mg Oral Daily    amitriptyline (ELAVIL) tablet 50 mg  50 mg Oral HS    b complex-vitamin C-folic acid (NEPHROCAPS) capsule 1 capsule  1 capsule Oral Daily With Dinner    bisacodyl (DULCOLAX) rectal suppository 10 mg  10 mg Rectal Daily    ferrous sulfate tablet 325 mg  325 mg Oral Every Other Day    morphine injection 2 mg  2 mg Intravenous Q4H PRN    ondansetron (ZOFRAN) injection 4 mg  4 mg Intravenous Q6H PRN    pantoprazole (PROTONIX) EC tablet 40 mg  40 mg Oral Daily    polyethylene glycol (MIRALAX) packet 17 g  17 g Oral Once    potassium chloride 10 % oral solution 60 mEq  60 mEq Oral 6x Daily    potassium chloride 20 mEq IVPB (premix)  20 mEq Intravenous Once    potassium chloride 40 mEq IVPB (premix)  40 mEq Intravenous Once    sucralfate (CARAFATE) tablet 1 g  1 g Oral Daily    thiamine (VITAMIN B1) tablet 100 mg  100 mg Oral Daily    topiramate (TOPAMAX) tablet 25 mg  25 mg Oral BID    zinc sulfate (ZINCATE) capsule 220 mg  220 mg Oral Daily     Medications Discontinued During This Encounter   Medication Reason    potassium chloride (K-DUR,KLOR-CON) CR tablet 60 mEq     potassium chloride 40 mEq IVPB (premix)     potassium chloride 10 % oral solution 40 mEq     multi-electrolyte (PLASMALYTE-A/ISOLYTE-S PH 7 4) IV solution     sodium bicarbonate tablet 650 mg     potassium chloride 40 mEq IVPB (premix) Error    potassium chloride 80 mEq in sodium chloride 0 9 % 100 mL IVPB     potassium chloride 60 mEq in sodium chloride 0 9 % 100 mL IVPB Reorder    polyethylene glycol (MIRALAX) packet 17 g        Benigno Babcock MD      This progress note was produced in part using a dictation device which may document imprecise wording from author's original intent

## 2020-08-20 NOTE — PROGRESS NOTES
Progress Note - Em Laird 1985, 38 y o  female MRN: [id number]    Unit/Bed#: -01 Encounter: [id number]    Primary Care Provider: Eboni Chavez DO   Date and time admitted to hospital: 8/17/2020 12:37 PM        * Hypokalemia  Assessment & Plan  · Recurrent severe hypokalemia  ·  follows outpatient with Nephrology   She has been compliant with her KCl 6 times a day and 3 times/week infusions of 80 mEq KCl IV  She had an extensive workup done by nephro for her hypokalemia and now is scheduled to go for research trial in [facility] in October  She previously was evaluated by bariatric surgery who declined to reverse her gastric bypass  · Symptomatic  · Increase her oral KCl to 60 mEq 6 times daily and also given between 120-140 meq of IV kcl DAILY FOR LAST 2 DAYS  Would recommend 80 mEq of IV KCl Monday Wednesday Friday and 40 mEq of IV KCl Tuesday Thursday Saturday and Sunday  · Weekly BMP   · Magnesium level is normal  · I did a taper of her Topamax last time she is currently on 50 mg p o  Q 12 hours and she has been tolerating she has not been able to see her neurologist so I will taper further to 25 mg p o  Q 12 hours  She did overshoot with correction today and hence oral and IV potassium is on hold till tomorrow  Recheck BMP again at 6:00 p m  And again in the morning try to keep potassium more than 3 5    Repeat potassium is 3 4 this morning  This is despite giving so much oral and 80 mEq of IV KCl this morning  Will give another of 60 mEq of IV KCl tonight and recheck BMP tomorrow  Patient will probably require  mEq of IV KCl daily at home    Left lower quadrant abdominal pain  Assessment & Plan  · Patient is complaining of left-sided flank pain for the last 10 days  No leukocytosis  No fevers or chills  No nausea vomiting or diarrhea  No constipation reported  CT renal stone study is negative for kidney stones    Ct abd shows  Dilated and markedly redundant colon with the mild decreased caliber of the sigmoid colon,  No evidence of small bowel obstruction  · No intra-abdominal fluid collection    Patient given few doses of MiraLax following which she has had 7 bowel movements but still having abdominal pain and discomfort  Discussed with GI and plan for colonoscopy tomorrow  Will do GoLYTELY prep and keep her NPO tonight  Normal anion gap metabolic acidosis  Assessment & Plan  · Resolved   · Topamax is being tapered off as well    GERD (gastroesophageal reflux disease)  Assessment & Plan  · Continue PPI    Vitamin B12 deficiency  Assessment & Plan  · She is on vitamin B12 injection monthly    Chronic anemia  Assessment & Plan  · Stable  · Hold iron due to abdominal discomfort    Migraine without aura and without status migrainosus, not intractable  Assessment & Plan  · She still has not seen her neurologist outpatient since last discharge from the hospital   · she has hypokalemia with no suspected nephrolithiasis and also previous non-anion gap metabolic acidosis which are all side effects of Topamax as well  Previously she was unable to be taken off cold turkey secondary to having rebound migraine  Continue gradual taper and take her down to 25 mg of Topamax b i d   · Continue Elavil  · Denies any headache    History of gastric bypass  Assessment & Plan  · Resume her vitamin supplementation      VTE Pharmacologic Prophylaxis:   Pharmacologic: Pharmacologic VTE Prophylaxis contraindicated due to Low risk  Mechanical VTE Prophylaxis in Place: Yes    Patient Centered Rounds: I have performed bedside rounds with nursing staff today  Discussions with Specialists or Other Care Team Provider:  Discussed with GI    Education and Discussions with Family / Patient:  Discussed with patient at bedside    Time Spent for Care: 30 minutes  More than 50% of total time spent on counseling and coordination of care as described above      Current Length of Stay: 3 day(s)    Current Patient Status: Inpatient   Certification Statement: The patient will continue to require additional inpatient hospital stay due to Hypokalemia    Discharge Plan:  Possible discharge home tomorrow post colonoscopy  Need monitoring of potassium levels    Code Status: Level 1 - Full Code      Subjective:   Patient denies any chest pain or shortness of breath or nausea vomiting or diarrhea  Complaining of 5-6 x 10 pain in her left lower quadrant of the abdomen  No resolution of pain despite having multiple bowel movements and trying to relieve the constipation    Objective:     Vitals:   Temp (24hrs), Av 2 °F (36 8 °C), Min:97 8 °F (36 6 °C), Max:98 7 °F (37 1 °C)    Temp:  [97 8 °F (36 6 °C)-98 7 °F (37 1 °C)] 98 7 °F (37 1 °C)  HR:  [79-89] 89  Resp:  [14-20] 14  BP: ()/(55-80) 108/68  SpO2:  [95 %-100 %] 99 %  There is no height or weight on file to calculate BMI  Input and Output Summary (last 24 hours): Intake/Output Summary (Last 24 hours) at 2020 1548  Last data filed at 2020 1315  Gross per 24 hour   Intake 240 ml   Output --   Net 240 ml       Physical Exam:     Physical Exam  Vitals signs and nursing note reviewed  Constitutional:       Appearance: Normal appearance  HENT:      Head: Normocephalic and atraumatic  Right Ear: External ear normal       Left Ear: External ear normal       Nose: Nose normal       Mouth/Throat:      Pharynx: Oropharynx is clear  Neck:      Musculoskeletal: Normal range of motion and neck supple  Cardiovascular:      Rate and Rhythm: Normal rate and regular rhythm  Heart sounds: Normal heart sounds  Pulmonary:      Effort: Pulmonary effort is normal       Breath sounds: Normal breath sounds  Abdominal:      General: Bowel sounds are normal       Palpations: Abdomen is soft  Tenderness: There is abdominal tenderness  Comments: Periumbilical tenderness noted and left lower quadrant tenderness noted    No guarding or rebound tenderness noted  Musculoskeletal: Normal range of motion  Skin:     General: Skin is warm and dry  Capillary Refill: Capillary refill takes less than 2 seconds  Neurological:      General: No focal deficit present  Mental Status: She is alert and oriented to person, place, and time  Psychiatric:         Mood and Affect: Mood normal            Additional Data:     Labs:    Results from last 7 days   Lab Units 08/17/20  1451   WBC Thousand/uL 8 10   HEMOGLOBIN g/dL 13 5   HEMATOCRIT % 42 1   PLATELETS Thousands/uL 366   NEUTROS PCT % 78*   LYMPHS PCT % 15   MONOS PCT % 5   EOS PCT % 1     Results from last 7 days   Lab Units 08/20/20  1425   SODIUM mmol/L 135*   POTASSIUM mmol/L 5 8*   CHLORIDE mmol/L 106   CO2 mmol/L 19*   BUN mg/dL 14   CREATININE mg/dL 1 19   ANION GAP mmol/L 10   CALCIUM mg/dL 7 9*   GLUCOSE RANDOM mg/dL 82                           * I Have Reviewed All Lab Data Listed Above  * Additional Pertinent Lab Tests Reviewed:  Veto  Admission Reviewed    Imaging:    Imaging Reports Reviewed Today Include:  CT abdomen pelvis  Imaging Personally Reviewed by Myself Includes:  CT abdomen pelvis    Recent Cultures (last 7 days):           Last 24 Hours Medication List:   Current Facility-Administered Medications   Medication Dose Route Frequency Provider Last Rate    acetaminophen  650 mg Oral Q6H PRN Markus Franks MD      AMILoride  5 mg Oral Daily Markus Franks MD      amitriptyline  50 mg Oral HS Markus Franks MD      b complex-vitamin C-folic acid  1 capsule Oral Daily With Dinner Markus Franks MD      bisacodyl  10 mg Rectal Daily Venkata Tinajero MD      ferrous sulfate  325 mg Oral Every Other Day Markus Franks MD      morphine injection  2 mg Intravenous Q4H PRN Markus Franks MD      ondansetron  4 mg Intravenous Q6H PRN Markus Franks MD      pantoprazole  40 mg Oral Daily Markus Franks MD      polyethylene glycol  4,000 mL Oral Once Edita Vicente MD      sucralfate  1 g Oral Daily Jean Pierre Calvillo MD      thiamine  100 mg Oral Daily Jean Pierre Calvillo MD      topiramate  25 mg Oral BID Jean Pierre Calvillo MD      zinc sulfate  220 mg Oral Daily Jean Pierre Calvillo MD          Today, Patient Was Seen By: Edita Vicente MD    ** Please Note: Dictation voice to text software may have been used in the creation of this document   **

## 2020-08-20 NOTE — CASE MANAGEMENT
Was asked to see if CM can make an earlier appointment with GI specialist: She was scheduled to see [place] Sept 9th  [facility]  CM called office [phone] spoke with [facility] appointment available tomorrow at 0830  CM spoke to patient and she is able to make this appointment  Confirmed appointment with scheduling for tomorrow- Information placed on AVS       1645: Aware plan of care has changed GI is consulted for Colonscopy tomorrow  MD has cancelled the appointment with GI that was set up for tomorrow  Will continue to follow

## 2020-08-20 NOTE — UTILIZATION REVIEW
Continued Stay Review    Date: 8/20                          Current Patient Class: IP Current Level of Care: MS     HPI:35 y o  female initially admitted on 8/17 for L flank pain and symptomatic hypokalemia   Assessment/Plan:     8/20 Nephrology note   Acute on chronic hypokalemia taking po supplements and IV infusions as OP   Plan to inc po and IV K   K 3 4 this am   K sparing diuretic ENAC channel blocker  CT abdomen showing dilated large gut likely ileus and distal bowel impacted with stool  May benefit from aggressive bowel cleaning by laxative      Pertinent Labs/Diagnostic Results:     Results from last 7 days   Lab Units 08/17/20  1451   WBC Thousand/uL 8 10   HEMOGLOBIN g/dL 13 5   HEMATOCRIT % 42 1   PLATELETS Thousands/uL 366   NEUTROS ABS Thousands/µL 6 32     Results from last 7 days   Lab Units 08/20/20  0614 08/19/20  1546 08/19/20  0531 08/18/20  1935 08/18/20  0527 08/17/20  1920   SODIUM mmol/L 138 136 139 135* 138 134*   POTASSIUM mmol/L 3 4* 3 2* 3 0* 3 6 2 1* 2 4*   CHLORIDE mmol/L 106 101 104 102 102 98*   CO2 mmol/L 23 24 24 23 25 21   ANION GAP mmol/L 9 11 11 10 11 15*   BUN mg/dL 13 13 9 12 13 15   CREATININE mg/dL 0 95 0 88 0 94 1 16 1 04 1 31*   EGFR ml/min/1 73sq m 77 85 78 61 69 52   CALCIUM mg/dL 8 1* 8 0* 7 9* 8 4 8 2* 8 6   MAGNESIUM mg/dL  --   --   --   --   --  2 5     Results from last 7 days   Lab Units 08/20/20  0614 08/19/20  1546 08/19/20  0531 08/18/20  1935 08/18/20  0527 08/17/20  1920 08/17/20  0914   GLUCOSE RANDOM mg/dL 83 88 91 87 91 98 111     Results from last 7 days   Lab Units 08/20/20  0614   CK TOTAL U/L 47       Results from last 7 days   Lab Units 08/17/20  1451   LIPASE u/L 180     Results from last 7 days   Lab Units 08/17/20  1647   CLARITY UA  Clear   COLOR UA  Yellow   SPEC GRAV UA  1 015   PH UA  6 5   GLUCOSE UA mg/dl Negative   KETONES UA mg/dl Trace*   BLOOD UA  Negative   PROTEIN UA mg/dl Negative   NITRITE UA  Negative   BILIRUBIN UA  Small* UROBILINOGEN UA E U /dl 0 2   LEUKOCYTES UA  Negative       Vital Signs:   08/20/20 07:34:52   98 4 °F (36 9 °C)   82   14   97/74   82   100 %          Medications:   Scheduled Medications:  AMILoride, 5 mg, Oral, Daily  amitriptyline, 50 mg, Oral, HS  b complex-vitamin C-folic acid, 1 capsule, Oral, Daily With Dinner  bisacodyl, 10 mg, Rectal, Daily  ferrous sulfate, 325 mg, Oral, Every Other Day  pantoprazole, 40 mg, Oral, Daily  polyethylene glycol, 17 g, Oral, Once  potassium chloride, 60 mEq, Oral, 6x Daily  potassium chloride, 20 mEq, Intravenous, Once  potassium chloride, 40 mEq, Intravenous, Once  sucralfate, 1 g, Oral, Daily  thiamine, 100 mg, Oral, Daily  topiramate, 25 mg, Oral, BID  zinc sulfate, 220 mg, Oral, Daily      Continuous IV Infusions:     PRN Meds:  acetaminophen, 650 mg, Oral, Q6H PRN  morphine injection, 2 mg, Intravenous, Q4H PRN  x2  ondansetron, 4 mg, Intravenous, Q6H PRN    Discharge Plan: D     Network Utilization Review Department  [email or web address]  ATTENTION: Please call with any questions or concerns to [phone] and carefully listen to the prompts so that you are directed to the right person  All voicemails are confidential   Bennie Cornejo all requests for admission clinical reviews, approved or denied determinations and any other requests to dedicated fax number below belonging to the campus where the patient is receiving treatment   List of dedicated fax numbers for the Facilities:  FACILITY NAME UR FAX NUMBER   ADMISSION DENIALS (Administrative/Medical Necessity) [phone]   [address] [place] [address] (Maternity/NICU/Pediatrics) [phone]   Raleigh Boots [phone]   Ladean Ros [phone]   Pa Garcia [phone]   [place] [id number] [facility] [address] [facility]  [address] [facility] [phone]   [address] [place] [address] [phone]

## 2020-08-20 NOTE — ASSESSMENT & PLAN NOTE
· Patient is complaining of left-sided flank pain for the last 10 days  No leukocytosis  No fevers or chills  No nausea vomiting or diarrhea  No constipation reported  CT renal stone study is negative for kidney stones  Ct abd shows  Dilated and markedly redundant colon with the mild decreased caliber of the sigmoid colon,  No evidence of small bowel obstruction  · No intra-abdominal fluid collection    Patient given few doses of MiraLax following which she has had 7 bowel movements but still having abdominal pain and discomfort  Discussed with GI and plan for colonoscopy tomorrow  Will do GoLYTELY prep and keep her NPO tonight

## 2020-08-20 NOTE — PLAN OF CARE
Problem: Potential for Falls  Goal: Patient will remain free of falls  Description: INTERVENTIONS:  - Assess patient frequently for physical needs  -  Identify cognitive and physical deficits and behaviors that affect risk of falls    -  [place] fall precautions as indicated by assessment   - Educate patient/family on patient safety including physical limitations  - Instruct patient to call for assistance with activity based on assessment  - Modify environment to reduce risk of injury  - Consider OT/PT consult to assist with strengthening/mobility  Outcome: Progressing

## 2020-08-20 NOTE — UTILIZATION REVIEW
Continued Stay Review    Date: 8/19                    Current Patient Class: Inpatient  Current Level of Care: Med/surg    HPI:35 y o  female initially admitted on 8/17    Assessment/Plan: Continue with low potassium IS getting total of 60 meq potassium chloride elixir and 80 meq IV potassium today  Recheck BMP later today  Continue potassium sparing diuretic  UPdate 8/19: Repeat potassium 3 2  This is despite giving so much oral and 80 mEq of IV KCl this morning  Will give another of 60 mEq of IV KCl tonight  Continue to monitor her left flank pain  Noted on imaging to have distended fluid filled large bowel       Pertinent Labs/Diagnostic Results:   8/19/20 CT A/P: No acute inflammatory stranding   Dilated and markedly redundant colon as seen on the previous study with the mild decreased caliber of the sigmoid colon, as seen on the previous study with no worsening   No evidence of small bowel obstruction   No intra-abdominal fluid collection       Results from last 7 days   Lab Units 08/17/20  1451   WBC Thousand/uL 8 10   HEMOGLOBIN g/dL 13 5   HEMATOCRIT % 42 1   PLATELETS Thousands/uL 366   NEUTROS ABS Thousands/µL 6 32         Results from last 7 days   Lab Units 08/19/20  1546 08/19/20  0531 08/18/20 1935 08/18/20  0527 08/17/20  1920   SODIUM mmol/L 136 139 135* 138 134*   POTASSIUM mmol/L 3 2* 3 0* 3 6 2 1* 2 4*   CHLORIDE mmol/L 101 104 102 102 98*   CO2 mmol/L 24 24 23 25 21   ANION GAP mmol/L 11 11 10 11 15*   BUN mg/dL 13 9 12 13 15   CREATININE mg/dL 0 88 0 94 1 16 1 04 1 31*   EGFR ml/min/1 73sq m 85 78 61 69 52   CALCIUM mg/dL 8 0* 7 9* 8 4 8 2* 8 6   MAGNESIUM mg/dL  --   --   --   --  2 5             Results from last 7 days   Lab Units 08/19/20  1546 08/19/20  0531 08/18/20  1935 08/18/20  0527 08/17/20  1920 08/17/20  0914   GLUCOSE RANDOM mg/dL 88 91 87 91 98 111         Results from last 7 days   Lab Units 08/17/20  1451   LIPASE u/L 180             Results from last 7 days   Lab Units 08/17/20  1647   CLARITY UA  Clear   COLOR UA  Yellow   SPEC GRAV UA  1 015   PH UA  6 5   GLUCOSE UA mg/dl Negative   KETONES UA mg/dl Trace*   BLOOD UA  Negative   PROTEIN UA mg/dl Negative   NITRITE UA  Negative   BILIRUBIN UA  Small*   UROBILINOGEN UA E U /dl 0 2   LEUKOCYTES UA  Negative         Vital Signs:  Date/Time   Temp   Pulse   Resp   BP   MAP (mmHg)   SpO2   O2 Device   Patient Position - Orthostatic VS    08/19/20 16:18:41   98 °F (36 7 °C)   86   20   118/80   93   98 %   --   --    08/19/20 07:24:07   98 3 °F (36 8 °C)   77   12   105/68   80   98 %   --   --    08/19/20 0535   --   --   --   --                Medications:   Scheduled Medications:  AMILoride, 5 mg, Oral, Daily  amitriptyline, 50 mg, Oral, HS  b complex-vitamin C-folic acid, 1 capsule, Oral, Daily With Dinner  bisacodyl, 10 mg, Rectal, Daily  ferrous sulfate, 325 mg, Oral, Every Other Day  pantoprazole, 40 mg, Oral, Daily  potassium chloride, 60 mEq, Oral, 6x Daily  potassium chloride, 20 mEq, Intravenous, Once  sucralfate, 1 g, Oral, Daily  thiamine, 100 mg, Oral, Daily  topiramate, 25 mg, Oral, BID  zinc sulfate, 220 mg, Oral, Daily      Continuous IV Infusions:     PRN Meds:  acetaminophen, 650 mg, Oral, Q6H PRN  morphine injection, 2 mg, Intravenous, Q4H PRN  ondansetron, 4 mg, Intravenous, Q6H PRN        Discharge Plan: TBD  Network Utilization Review Department  [email or web address]  ATTENTION: Please call with any questions or concerns to [phone] and carefully listen to the prompts so that you are directed to the right person  All voicemails are confidential   Sanjuana Grand all requests for admission clinical reviews, approved or denied determinations and any other requests to dedicated fax number below belonging to the campus where the patient is receiving treatment   List of dedicated fax numbers for the Facilities:  FACILITY NAME UR FAX NUMBER   ADMISSION DENIALS (Administrative/Medical Necessity) [address] [facility] (Maternity/NICU/Pediatrics) [phone]   Chano Gibson [phone]   Deysi Cedeño [phone]   Yuriy Quintana [phone]   [address] [phone]   [facility]  [phone]   [address] [place] [address], [facility]  [address] [place] [address] And [address] [phone]

## 2020-08-20 NOTE — PLAN OF CARE
Problem: Potential for Falls  Goal: Patient will remain free of falls  Description: INTERVENTIONS:  - Assess patient frequently for physical needs  -  Identify cognitive and physical deficits and behaviors that affect risk of falls    -  [place] fall precautions as indicated by assessment   - Educate patient/family on patient safety including physical limitations  - Instruct patient to call for assistance with activity based on assessment  - Modify environment to reduce risk of injury  - Consider OT/PT consult to assist with strengthening/mobility  8/20/2020 1040 by Deshaun Longoria RN  Outcome: Progressing  8/20/2020 1039 by Deshaun Longoria RN  Outcome: Progressing     Problem: PAIN - ADULT  Goal: Verbalizes/displays adequate comfort level or baseline comfort level  Description: Interventions:  - Encourage patient to monitor pain and request assistance  - Assess pain using appropriate pain scale  - Administer analgesics based on type and severity of pain and evaluate response  - Implement non-pharmacological measures as appropriate and evaluate response  - Consider cultural and social influences on pain and pain management  - Notify physician/advanced practitioner if interventions unsuccessful or patient reports new pain  8/20/2020 1040 by Deshaun Longoria RN  Outcome: Progressing  8/20/2020 1039 by Deshaun Longoria RN  Outcome: Progressing     Problem: INFECTION - ADULT  Goal: Absence or prevention of progression during hospitalization  Description: INTERVENTIONS:  - Assess and monitor for signs and symptoms of infection  - Monitor lab/diagnostic results  - Monitor all insertion sites, i e  indwelling lines, tubes, and drains  - Monitor endotracheal if appropriate and nasal secretions for changes in amount and color  - [place] appropriate cooling/warming therapies per order  - Administer medications as ordered  - Instruct and encourage patient and family to use good hand hygiene technique  - Identify and instruct in appropriate isolation precautions for identified infection/condition  8/20/2020 1040 by Michael Mai RN  Outcome: Progressing  8/20/2020 1039 by Michael Mai RN  Outcome: Progressing     Problem: SAFETY ADULT  Goal: Patient will remain free of falls  Description: INTERVENTIONS:  - Assess patient frequently for physical needs  -  Identify cognitive and physical deficits and behaviors that affect risk of falls    -  [place] fall precautions as indicated by assessment   - Educate patient/family on patient safety including physical limitations  - Instruct patient to call for assistance with activity based on assessment  - Modify environment to reduce risk of injury  - Consider OT/PT consult to assist with strengthening/mobility  8/20/2020 1040 by Michael Mai RN  Outcome: Progressing  8/20/2020 1039 by Michael Mai RN  Outcome: Progressing  Goal: Maintain or return to baseline ADL function  Description: INTERVENTIONS:  -  Assess patient's ability to carry out ADLs; assess patient's baseline for ADL function and identify physical deficits which impact ability to perform ADLs (bathing, care of mouth/teeth, toileting, grooming, dressing, etc )  - Assess/evaluate cause of self-care deficits   - Assess range of motion  - Assess patient's mobility; develop plan if impaired  - Assess patient's need for assistive devices and provide as appropriate  - Encourage maximum independence but intervene and supervise when necessary  - Involve family in performance of ADLs  - Assess for home care needs following discharge   - Consider OT consult to assist with ADL evaluation and planning for discharge  - Provide patient education as appropriate  8/20/2020 1040 by Michael Mai RN  Outcome: Progressing  8/20/2020 1039 by Michael Mai RN  Outcome: Progressing     Problem: DISCHARGE PLANNING  Goal: Discharge to home or other facility with appropriate resources  Description: INTERVENTIONS:  - Identify barriers to discharge w/patient and caregiver  - Arrange for needed discharge resources and transportation as appropriate  - Identify discharge learning needs (meds, wound care, etc )  - Arrange for interpretive services to assist at discharge as needed  - Refer to Case Management Department for coordinating discharge planning if the patient needs post-hospital services based on physician/advanced practitioner order or complex needs related to functional status, cognitive ability, or social support system  8/20/2020 1040 by Jude Prabhakar RN  Outcome: Progressing  8/20/2020 1039 by Jude Prabhakar RN  Outcome: Progressing     Problem: Knowledge Deficit  Goal: Patient/family/caregiver demonstrates understanding of disease process, treatment plan, medications, and discharge instructions  Description: Complete learning assessment and assess knowledge base    Interventions:  - Provide teaching at level of understanding  - Provide teaching via preferred learning methods  8/20/2020 1040 by Jude Prabhakar RN  Outcome: Progressing  8/20/2020 1039 by Jude Prabhakar RN  Outcome: Progressing     Problem: CARDIOVASCULAR - ADULT  Goal: Maintains optimal cardiac output and hemodynamic stability  Description: INTERVENTIONS:  - Monitor I/O, vital signs and rhythm  - Monitor for S/S and trends of decreased cardiac output  - Administer and titrate ordered vasoactive medications to optimize hemodynamic stability  - Assess quality of pulses, skin color and temperature  - Assess for signs of decreased coronary artery perfusion  - Instruct patient to report change in severity of symptoms  8/20/2020 1040 by Jude Prabhakar RN  Outcome: Progressing  8/20/2020 1039 by Jude Prabhakar RN  Outcome: Progressing  Goal: Absence of cardiac dysrhythmias or at baseline rhythm  Description: INTERVENTIONS:  - Continuous cardiac monitoring, vital signs, obtain 12 lead EKG if ordered  - Administer antiarrhythmic and heart rate control medications as ordered  - Monitor electrolytes and administer replacement therapy as ordered  8/20/2020 1040 by Katy Trivedi RN  Outcome: Progressing  8/20/2020 1039 by Katy Trivedi RN  Outcome: Progressing     Problem: METABOLIC, FLUID AND ELECTROLYTES - ADULT  Goal: Electrolytes maintained within normal limits  Description: INTERVENTIONS:  - Monitor labs and assess patient for signs and symptoms of electrolyte imbalances  - Administer electrolyte replacement as ordered  - Monitor response to electrolyte replacements, including repeat lab results as appropriate  - Instruct patient on fluid and nutrition as appropriate  8/20/2020 1040 by Katy Trivedi RN  Outcome: Progressing  8/20/2020 1039 by Katy Trivedi RN  Outcome: Progressing  Goal: Fluid balance maintained  Description: INTERVENTIONS:  - Monitor labs   - Monitor I/O and WT  - Instruct patient on fluid and nutrition as appropriate  - Assess for signs & symptoms of volume excess or deficit  8/20/2020 1040 by Katy Trivedi RN  Outcome: Progressing  8/20/2020 1039 by Katy Trivedi RN  Outcome: Progressing

## 2020-08-20 NOTE — ASSESSMENT & PLAN NOTE
· Recurrent severe hypokalemia  ·  follows outpatient with Nephrology   She has been compliant with her KCl 6 times a day and 3 times/week infusions of 80 mEq KCl IV  She had an extensive workup done by nephro for her hypokalemia and now is scheduled to go for [facility] trial 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  · Symptomatic  · Increase her oral KCl to 60 mEq 6 times daily and also given between 120-140 meq of IV kcl DAILY FOR LAST 2 DAYS  Would recommend 80 mEq of IV KCl Monday Wednesday Friday and 40 mEq of IV KCl Tuesday Thursday Saturday and Sunday  · Weekly BMP   · Magnesium level is normal  · I did a taper of her Topamax last time she is currently on 50 mg p o  Q 12 hours and she has been tolerating she has not been able to see her neurologist so I will taper further to 25 mg p o  Q 12 hours  She did overshoot with correction today and hence oral and IV potassium is on hold till tomorrow  Recheck BMP again at 6:00 p m  And again in the morning try to keep potassium more than 3 5    Repeat potassium is 3 4 this morning  This is despite giving so much oral and 80 mEq of IV KCl this morning  Will give another of 60 mEq of IV KCl tonight and recheck BMP tomorrow    Patient will probably require  mEq of IV KCl daily at home

## 2020-08-21 ENCOUNTER — ANESTHESIA EVENT (INPATIENT)
Dept: GASTROENTEROLOGY | Facility: HOSPITAL | Age: 35
DRG: 389 | End: 2020-08-21
Payer: COMMERCIAL

## 2020-08-21 ENCOUNTER — APPOINTMENT (INPATIENT)
Dept: GASTROENTEROLOGY | Facility: HOSPITAL | Age: 35
DRG: 389 | End: 2020-08-21
Attending: INTERNAL MEDICINE
Payer: COMMERCIAL

## 2020-08-21 ENCOUNTER — ANESTHESIA (INPATIENT)
Dept: GASTROENTEROLOGY | Facility: HOSPITAL | Age: 35
DRG: 389 | End: 2020-08-21
Payer: COMMERCIAL

## 2020-08-21 VITALS
HEIGHT: 62 IN | TEMPERATURE: 98.3 F | DIASTOLIC BLOOD PRESSURE: 86 MMHG | BODY MASS INDEX: 26.5 KG/M2 | SYSTOLIC BLOOD PRESSURE: 110 MMHG | WEIGHT: 144 LBS | RESPIRATION RATE: 16 BRPM | OXYGEN SATURATION: 99 % | HEART RATE: 88 BPM

## 2020-08-21 LAB
ANION GAP SERPL CALCULATED.3IONS-SCNC: 10 MMOL/L (ref 4–13)
ANION GAP SERPL CALCULATED.3IONS-SCNC: 11 MMOL/L (ref 4–13)
ANION GAP SERPL CALCULATED.3IONS-SCNC: 11 MMOL/L (ref 4–13)
BUN SERPL-MCNC: 12 MG/DL (ref 5–25)
BUN SERPL-MCNC: 13 MG/DL (ref 5–25)
BUN SERPL-MCNC: 18 MG/DL (ref 5–25)
CALCIUM SERPL-MCNC: 8.2 MG/DL (ref 8.3–10.1)
CALCIUM SERPL-MCNC: 8.7 MG/DL (ref 8.3–10.1)
CALCIUM SERPL-MCNC: 8.7 MG/DL (ref 8.3–10.1)
CHLORIDE SERPL-SCNC: 104 MMOL/L (ref 100–108)
CHLORIDE SERPL-SCNC: 105 MMOL/L (ref 100–108)
CHLORIDE SERPL-SCNC: 108 MMOL/L (ref 100–108)
CO2 SERPL-SCNC: 20 MMOL/L (ref 21–32)
CO2 SERPL-SCNC: 22 MMOL/L (ref 21–32)
CO2 SERPL-SCNC: 22 MMOL/L (ref 21–32)
CREAT SERPL-MCNC: 0.92 MG/DL (ref 0.6–1.3)
CREAT SERPL-MCNC: 0.96 MG/DL (ref 0.6–1.3)
CREAT SERPL-MCNC: 1.02 MG/DL (ref 0.6–1.3)
GFR SERPL CREATININE-BSD FRML MDRD: 71 ML/MIN/1.73SQ M
GFR SERPL CREATININE-BSD FRML MDRD: 76 ML/MIN/1.73SQ M
GFR SERPL CREATININE-BSD FRML MDRD: 80 ML/MIN/1.73SQ M
GLUCOSE SERPL-MCNC: 80 MG/DL (ref 65–140)
GLUCOSE SERPL-MCNC: 81 MG/DL (ref 65–140)
GLUCOSE SERPL-MCNC: 98 MG/DL (ref 65–140)
POTASSIUM SERPL-SCNC: 3.2 MMOL/L (ref 3.5–5.3)
POTASSIUM SERPL-SCNC: 3.5 MMOL/L (ref 3.5–5.3)
POTASSIUM SERPL-SCNC: 3.9 MMOL/L (ref 3.5–5.3)
SODIUM SERPL-SCNC: 137 MMOL/L (ref 136–145)
SODIUM SERPL-SCNC: 137 MMOL/L (ref 136–145)
SODIUM SERPL-SCNC: 139 MMOL/L (ref 136–145)

## 2020-08-21 PROCEDURE — 80048 BASIC METABOLIC PNL TOTAL CA: CPT | Performed by: FAMILY MEDICINE

## 2020-08-21 PROCEDURE — 99232 SBSQ HOSP IP/OBS MODERATE 35: CPT | Performed by: NURSE PRACTITIONER

## 2020-08-21 PROCEDURE — 0DJD8ZZ INSPECTION OF LOWER INTESTINAL TRACT, VIA NATURAL OR ARTIFICIAL OPENING ENDOSCOPIC: ICD-10-PCS | Performed by: FAMILY MEDICINE

## 2020-08-21 PROCEDURE — 80048 BASIC METABOLIC PNL TOTAL CA: CPT | Performed by: NURSE PRACTITIONER

## 2020-08-21 PROCEDURE — 99239 HOSP IP/OBS DSCHRG MGMT >30: CPT | Performed by: FAMILY MEDICINE

## 2020-08-21 RX ORDER — ACETAMINOPHEN 325 MG/1
650 TABLET ORAL EVERY 6 HOURS PRN
Qty: 30 TABLET | Refills: 0 | Status: SHIPPED | OUTPATIENT
Start: 2020-08-21 | End: 2020-09-23

## 2020-08-21 RX ORDER — LIDOCAINE HYDROCHLORIDE 10 MG/ML
INJECTION, SOLUTION EPIDURAL; INFILTRATION; INTRACAUDAL; PERINEURAL AS NEEDED
Status: DISCONTINUED | OUTPATIENT
Start: 2020-08-21 | End: 2020-08-21

## 2020-08-21 RX ORDER — TOPIRAMATE 25 MG/1
25 TABLET ORAL 2 TIMES DAILY
Qty: 60 TABLET | Refills: 0 | Status: SHIPPED | OUTPATIENT
Start: 2020-08-21 | End: 2020-09-20 | Stop reason: HOSPADM

## 2020-08-21 RX ORDER — POTASSIUM CHLORIDE 14.9 MG/ML
20 INJECTION INTRAVENOUS ONCE
Status: COMPLETED | OUTPATIENT
Start: 2020-08-21 | End: 2020-08-21

## 2020-08-21 RX ORDER — POTASSIUM CHLORIDE 400 MEQ/1000ML
40 INJECTION, SOLUTION INTRAVENOUS 3 TIMES WEEKLY
Qty: 1200 ML | Refills: 0 | Status: SHIPPED | OUTPATIENT
Start: 2020-08-21 | End: 2020-08-21 | Stop reason: SDUPTHER

## 2020-08-21 RX ORDER — ONDANSETRON 2 MG/ML
4 INJECTION INTRAMUSCULAR; INTRAVENOUS ONCE AS NEEDED
Status: DISCONTINUED | OUTPATIENT
Start: 2020-08-21 | End: 2020-08-21 | Stop reason: HOSPADM

## 2020-08-21 RX ORDER — POTASSIUM CHLORIDE 29.8 MG/ML
40 INJECTION INTRAVENOUS ONCE
Status: DISCONTINUED | OUTPATIENT
Start: 2020-08-21 | End: 2020-08-21 | Stop reason: RX

## 2020-08-21 RX ORDER — POTASSIUM CHLORIDE 400 MEQ/1000ML
40 INJECTION, SOLUTION INTRAVENOUS 3 TIMES WEEKLY
Qty: 1200 ML | Refills: 0 | Status: SHIPPED | OUTPATIENT
Start: 2020-08-21 | End: 2020-09-23

## 2020-08-21 RX ORDER — SODIUM CHLORIDE 9 MG/ML
INJECTION, SOLUTION INTRAVENOUS CONTINUOUS PRN
Status: DISCONTINUED | OUTPATIENT
Start: 2020-08-21 | End: 2020-08-21

## 2020-08-21 RX ORDER — POLYETHYLENE GLYCOL 3350 17 G/17G
17 POWDER, FOR SOLUTION ORAL DAILY
Qty: 14 EACH | Refills: 0 | Status: SHIPPED | OUTPATIENT
Start: 2020-08-21 | End: 2020-10-01 | Stop reason: HOSPADM

## 2020-08-21 RX ORDER — POTASSIUM CHLORIDE 400 MEQ/1000ML
40 INJECTION, SOLUTION INTRAVENOUS 3 TIMES WEEKLY
Qty: 13500 ML | Refills: 0
Start: 2020-08-21 | End: 2020-08-21 | Stop reason: SDUPTHER

## 2020-08-21 RX ORDER — POTASSIUM CHLORIDE 14.9 MG/ML
20 INJECTION INTRAVENOUS
Status: COMPLETED | OUTPATIENT
Start: 2020-08-21 | End: 2020-08-21

## 2020-08-21 RX ORDER — POTASSIUM CHLORIDE 20MEQ/15ML
60 LIQUID (ML) ORAL
Qty: 8100 ML | Refills: 0 | Status: SHIPPED | OUTPATIENT
Start: 2020-08-21 | End: 2020-09-20 | Stop reason: HOSPADM

## 2020-08-21 RX ORDER — PROPOFOL 10 MG/ML
INJECTION, EMULSION INTRAVENOUS AS NEEDED
Status: DISCONTINUED | OUTPATIENT
Start: 2020-08-21 | End: 2020-08-21

## 2020-08-21 RX ORDER — PROPOFOL 10 MG/ML
INJECTION, EMULSION INTRAVENOUS CONTINUOUS PRN
Status: DISCONTINUED | OUTPATIENT
Start: 2020-08-21 | End: 2020-08-21

## 2020-08-21 RX ADMIN — PROPOFOL 120 MCG/KG/MIN: 10 INJECTION, EMULSION INTRAVENOUS at 12:50

## 2020-08-21 RX ADMIN — MORPHINE SULFATE 2 MG: 2 INJECTION, SOLUTION INTRAMUSCULAR; INTRAVENOUS at 03:53

## 2020-08-21 RX ADMIN — PROPOFOL 80 MG: 10 INJECTION, EMULSION INTRAVENOUS at 12:49

## 2020-08-21 RX ADMIN — MORPHINE SULFATE 2 MG: 2 INJECTION, SOLUTION INTRAMUSCULAR; INTRAVENOUS at 10:29

## 2020-08-21 RX ADMIN — SODIUM CHLORIDE: 0.9 INJECTION, SOLUTION INTRAVENOUS at 12:45

## 2020-08-21 RX ADMIN — POTASSIUM CHLORIDE 20 MEQ: 14.9 INJECTION, SOLUTION INTRAVENOUS at 02:48

## 2020-08-21 RX ADMIN — POTASSIUM CHLORIDE 20 MEQ: 14.9 INJECTION, SOLUTION INTRAVENOUS at 14:17

## 2020-08-21 RX ADMIN — POTASSIUM CHLORIDE 60 MEQ: 1500 TABLET, EXTENDED RELEASE ORAL at 14:17

## 2020-08-21 RX ADMIN — POTASSIUM CHLORIDE 20 MEQ: 14.9 INJECTION, SOLUTION INTRAVENOUS at 10:37

## 2020-08-21 RX ADMIN — LIDOCAINE HYDROCHLORIDE 30 MG: 10 INJECTION, SOLUTION EPIDURAL; INFILTRATION; INTRACAUDAL; PERINEURAL at 12:49

## 2020-08-21 RX ADMIN — POTASSIUM CHLORIDE 20 MEQ: 14.9 INJECTION, SOLUTION INTRAVENOUS at 04:41

## 2020-08-21 NOTE — PLAN OF CARE
Problem: Potential for Falls  Goal: Patient will remain free of falls  Description: INTERVENTIONS:  - Assess patient frequently for physical needs  -  Identify cognitive and physical deficits and behaviors that affect risk of falls    -  [place] fall precautions as indicated by assessment   - Educate patient/family on patient safety including physical limitations  - Instruct patient to call for assistance with activity based on assessment  - Modify environment to reduce risk of injury  - Consider OT/PT consult to assist with strengthening/mobility  8/21/2020 0134 by Jenna Bumpers, RN  Outcome: Progressing  8/21/2020 0134 by Jenna Bumpers, RN  Outcome: Progressing     Problem: PAIN - ADULT  Goal: Verbalizes/displays adequate comfort level or baseline comfort level  Description: Interventions:  - Encourage patient to monitor pain and request assistance  - Assess pain using appropriate pain scale  - Administer analgesics based on type and severity of pain and evaluate response  - Implement non-pharmacological measures as appropriate and evaluate response  - Consider cultural and social influences on pain and pain management  - Notify physician/advanced practitioner if interventions unsuccessful or patient reports new pain  8/21/2020 0134 by Jenna Bumpers, RN  Outcome: Progressing  8/21/2020 0134 by Jenna Bumpers, RN  Outcome: Progressing     Problem: INFECTION - ADULT  Goal: Absence or prevention of progression during hospitalization  Description: INTERVENTIONS:  - Assess and monitor for signs and symptoms of infection  - Monitor lab/diagnostic results  - Monitor all insertion sites, i e  indwelling lines, tubes, and drains  - Monitor endotracheal if appropriate and nasal secretions for changes in amount and color  - [place] appropriate cooling/warming therapies per order  - Administer medications as ordered  - Instruct and encourage patient and family to use good hand hygiene technique  - Identify and instruct in appropriate isolation precautions for identified infection/condition  8/21/2020 0134 by Cristy Ferris RN  Outcome: Progressing  8/21/2020 0134 by Cristy Ferris RN  Outcome: Progressing     Problem: SAFETY ADULT  Goal: Patient will remain free of falls  Description: INTERVENTIONS:  - Assess patient frequently for physical needs  -  Identify cognitive and physical deficits and behaviors that affect risk of falls    -  [place] fall precautions as indicated by assessment   - Educate patient/family on patient safety including physical limitations  - Instruct patient to call for assistance with activity based on assessment  - Modify environment to reduce risk of injury  - Consider OT/PT consult to assist with strengthening/mobility  8/21/2020 0134 by Cristy Ferris RN  Outcome: Progressing  8/21/2020 0134 by Cristy Ferris RN  Outcome: Progressing  Goal: Maintain or return to baseline ADL function  Description: INTERVENTIONS:  -  Assess patient's ability to carry out ADLs; assess patient's baseline for ADL function and identify physical deficits which impact ability to perform ADLs (bathing, care of mouth/teeth, toileting, grooming, dressing, etc )  - Assess/evaluate cause of self-care deficits   - Assess range of motion  - Assess patient's mobility; develop plan if impaired  - Assess patient's need for assistive devices and provide as appropriate  - Encourage maximum independence but intervene and supervise when necessary  - Involve family in performance of ADLs  - Assess for home care needs following discharge   - Consider OT consult to assist with ADL evaluation and planning for discharge  - Provide patient education as appropriate  8/21/2020 0134 by Cristy Ferris RN  Outcome: Progressing  8/21/2020 0134 by Cristy Ferris RN  Outcome: Progressing     Problem: DISCHARGE PLANNING  Goal: Discharge to home or other facility with appropriate resources  Description: INTERVENTIONS:  - Identify barriers to discharge w/patient and caregiver  - Arrange for needed discharge resources and transportation as appropriate  - Identify discharge learning needs (meds, wound care, etc )  - Arrange for interpretive services to assist at discharge as needed  - Refer to Case Management Department for coordinating discharge planning if the patient needs post-hospital services based on physician/advanced practitioner order or complex needs related to functional status, cognitive ability, or social support system  8/21/2020 0134 by Grecia Albarran RN  Outcome: Progressing  8/21/2020 0134 by Grecia Albarran RN  Outcome: Progressing     Problem: Knowledge Deficit  Goal: Patient/family/caregiver demonstrates understanding of disease process, treatment plan, medications, and discharge instructions  Description: Complete learning assessment and assess knowledge base    Interventions:  - Provide teaching at level of understanding  - Provide teaching via preferred learning methods  8/21/2020 0134 by Grecia Albarran RN  Outcome: Progressing  8/21/2020 0134 by Grecia Albarran RN  Outcome: Progressing     Problem: CARDIOVASCULAR - ADULT  Goal: Maintains optimal cardiac output and hemodynamic stability  Description: INTERVENTIONS:  - Monitor I/O, vital signs and rhythm  - Monitor for S/S and trends of decreased cardiac output  - Administer and titrate ordered vasoactive medications to optimize hemodynamic stability  - Assess quality of pulses, skin color and temperature  - Assess for signs of decreased coronary artery perfusion  - Instruct patient to report change in severity of symptoms  8/21/2020 0134 by Grecia Albarran RN  Outcome: Progressing  8/21/2020 0134 by Grecia Albarran RN  Outcome: Progressing  Goal: Absence of cardiac dysrhythmias or at baseline rhythm  Description: INTERVENTIONS:  - Continuous cardiac monitoring, vital signs, obtain 12 lead EKG if ordered  - Administer antiarrhythmic and heart rate control medications as ordered  - Monitor electrolytes and administer replacement therapy as ordered  8/21/2020 0134 by James Correa RN  Outcome: Progressing  8/21/2020 0134 by James Correa RN  Outcome: Progressing     Problem: METABOLIC, FLUID AND ELECTROLYTES - ADULT  Goal: Electrolytes maintained within normal limits  Description: INTERVENTIONS:  - Monitor labs and assess patient for signs and symptoms of electrolyte imbalances  - Administer electrolyte replacement as ordered  - Monitor response to electrolyte replacements, including repeat lab results as appropriate  - Instruct patient on fluid and nutrition as appropriate  8/21/2020 0134 by James Correa RN  Outcome: Progressing  8/21/2020 0134 by James Correa RN  Outcome: Progressing  Goal: Fluid balance maintained  Description: INTERVENTIONS:  - Monitor labs   - Monitor I/O and WT  - Instruct patient on fluid and nutrition as appropriate  - Assess for signs & symptoms of volume excess or deficit  8/21/2020 0134 by James Correa RN  Outcome: Progressing  8/21/2020 0134 by James Correa RN  Outcome: Progressing

## 2020-08-21 NOTE — PLAN OF CARE
Problem: Potential for Falls  Goal: Patient will remain free of falls  Description: INTERVENTIONS:  - Assess patient frequently for physical needs  -  Identify cognitive and physical deficits and behaviors that affect risk of falls    -  [place] fall precautions as indicated by assessment   - Educate patient/family on patient safety including physical limitations  - Instruct patient to call for assistance with activity based on assessment  - Modify environment to reduce risk of injury  - Consider OT/PT consult to assist with strengthening/mobility  Outcome: Adequate for Discharge     Problem: PAIN - ADULT  Goal: Verbalizes/displays adequate comfort level or baseline comfort level  Description: Interventions:  - Encourage patient to monitor pain and request assistance  - Assess pain using appropriate pain scale  - Administer analgesics based on type and severity of pain and evaluate response  - Implement non-pharmacological measures as appropriate and evaluate response  - Consider cultural and social influences on pain and pain management  - Notify physician/advanced practitioner if interventions unsuccessful or patient reports new pain  Outcome: Adequate for Discharge     Problem: INFECTION - ADULT  Goal: Absence or prevention of progression during hospitalization  Description: INTERVENTIONS:  - Assess and monitor for signs and symptoms of infection  - Monitor lab/diagnostic results  - Monitor all insertion sites, i e  indwelling lines, tubes, and drains  - Monitor endotracheal if appropriate and nasal secretions for changes in amount and color  - [place] appropriate cooling/warming therapies per order  - Administer medications as ordered  - Instruct and encourage patient and family to use good hand hygiene technique  - Identify and instruct in appropriate isolation precautions for identified infection/condition  Outcome: Adequate for Discharge     Problem: SAFETY ADULT  Goal: Patient will remain free of falls  Description: INTERVENTIONS:  - Assess patient frequently for physical needs  -  Identify cognitive and physical deficits and behaviors that affect risk of falls    -  [place] fall precautions as indicated by assessment   - Educate patient/family on patient safety including physical limitations  - Instruct patient to call for assistance with activity based on assessment  - Modify environment to reduce risk of injury  - Consider OT/PT consult to assist with strengthening/mobility  Outcome: Adequate for Discharge  Goal: Maintain or return to baseline ADL function  Description: INTERVENTIONS:  -  Assess patient's ability to carry out ADLs; assess patient's baseline for ADL function and identify physical deficits which impact ability to perform ADLs (bathing, care of mouth/teeth, toileting, grooming, dressing, etc )  - Assess/evaluate cause of self-care deficits   - Assess range of motion  - Assess patient's mobility; develop plan if impaired  - Assess patient's need for assistive devices and provide as appropriate  - Encourage maximum independence but intervene and supervise when necessary  - Involve family in performance of ADLs  - Assess for home care needs following discharge   - Consider OT consult to assist with ADL evaluation and planning for discharge  - Provide patient education as appropriate  Outcome: Adequate for Discharge     Problem: DISCHARGE PLANNING  Goal: Discharge to home or other facility with appropriate resources  Description: INTERVENTIONS:  - Identify barriers to discharge w/patient and caregiver  - Arrange for needed discharge resources and transportation as appropriate  - Identify discharge learning needs (meds, wound care, etc )  - Arrange for interpretive services to assist at discharge as needed  - Refer to Case Management Department for coordinating discharge planning if the patient needs post-hospital services based on physician/advanced practitioner order or complex needs related to functional status, cognitive ability, or social support system  Outcome: Adequate for Discharge     Problem: Knowledge Deficit  Goal: Patient/family/caregiver demonstrates understanding of disease process, treatment plan, medications, and discharge instructions  Description: Complete learning assessment and assess knowledge base    Interventions:  - Provide teaching at level of understanding  - Provide teaching via preferred learning methods  Outcome: Adequate for Discharge     Problem: CARDIOVASCULAR - ADULT  Goal: Maintains optimal cardiac output and hemodynamic stability  Description: INTERVENTIONS:  - Monitor I/O, vital signs and rhythm  - Monitor for S/S and trends of decreased cardiac output  - Administer and titrate ordered vasoactive medications to optimize hemodynamic stability  - Assess quality of pulses, skin color and temperature  - Assess for signs of decreased coronary artery perfusion  - Instruct patient to report change in severity of symptoms  Outcome: Adequate for Discharge  Goal: Absence of cardiac dysrhythmias or at baseline rhythm  Description: INTERVENTIONS:  - Continuous cardiac monitoring, vital signs, obtain 12 lead EKG if ordered  - Administer antiarrhythmic and heart rate control medications as ordered  - Monitor electrolytes and administer replacement therapy as ordered  Outcome: Adequate for Discharge     Problem: METABOLIC, FLUID AND ELECTROLYTES - ADULT  Goal: Electrolytes maintained within normal limits  Description: INTERVENTIONS:  - Monitor labs and assess patient for signs and symptoms of electrolyte imbalances  - Administer electrolyte replacement as ordered  - Monitor response to electrolyte replacements, including repeat lab results as appropriate  - Instruct patient on fluid and nutrition as appropriate  Outcome: Adequate for Discharge  Goal: Fluid balance maintained  Description: INTERVENTIONS:  - Monitor labs   - Monitor I/O and WT  - Instruct patient on fluid and nutrition as appropriate  - Assess for signs & symptoms of volume excess or deficit  Outcome: Adequate for Discharge

## 2020-08-21 NOTE — CASE MANAGEMENT
Checked with Brenda as MD escribed new orders for 40 alexis 3 times a week Tues/Thur and Sunday  Per Walter Childress the orders were received  CM also faxed the dc summary to Lalit

## 2020-08-21 NOTE — PROGRESS NOTES
NEPHROLOGY PROGRESS NOTE   Ean Hernandez 38 y o  female MRN: [id number]  Unit/Bed#: -01 Encounter: [id number]    Assessment/Plan:    39 yo female well known to this service for chronic hypokalemia suspicious for GI losses s/p gastric bypass admitted  for left flank pain and nephrolithiasis ruled out  Dilated and markedly redundant colon noted on repeat CT therefore NPO after midnight for possible colonoscopy today  Did have episode of hyperkalemia yesterday now resolved  Potassium 3 5 mmol/L this AM  Potassium elixir increased this admission to 60 mEq 6 times per day  1  Acute on Chronic Hypokalemia  · Again, has underwent extensive work-up in past and is seeking outpatient second opinion for GI and also plan to follow with [facility] later this year  She infuses 80 mEq IV potassium three times weekly and was previously taking 40 mEq potassium elixir six times daily prior to presentation  · Did have hyperkalemia yesterday now resolved  · She is NPO for impending colonoscopy today  Monitor BMP frequently with goal potassium greater than 3 5 mmol/L  · She may require adjustment of oral/IV supplementation in the outpatient setting  Will defer this adjustment to her primary nephrologist, Dr Amor Hodges  · Continue potassium sparing diuretic  2  Left Lower Quadrant Pain  · Nephrolithiasis ruled out  Dilated and markedly redundant colon noted on CT scan hence she is NPO for colonoscopy today  3  Status post Roshan-en-Y gastric bypass  4  TICO (POA)- resolved  5  Anion Gap Metabolic Acidosis-resolved      ROS  Complains of left sided abdominal pain  A complete 10 point review of systems have been performed and are otherwise negative         Historical Information   Past Medical History:   Diagnosis Date    H  pylori infection     Hypokalemia     Migraine     Renal disorder     Rhabdomyolysis      Past Surgical History:   Procedure Laterality Date    ABDOMINAL SURGERY      APPENDECTOMY       SECTION      CHOLECYSTECTOMY      COSMETIC SURGERY      FL GUIDED CENTRAL VENOUS ACCESS DEVICE INSERTION  12/21/2018    GASTRIC BYPASS      HYSTERECTOMY      TUNNELED VENOUS PORT PLACEMENT N/A 12/21/2018    Procedure: INSERTION VENOUS PORT (PORT-A-CATH);   Surgeon: Joel Linares DO;  Location: [facility];  Service: General     Social History   Social History     Substance and Sexual Activity   Alcohol Use Never    Alcohol/week: 0 0 standard drinks    Frequency: Never    Drinks per session: Patient refused    Binge frequency: Never    Comment: 0     Social History     Substance and Sexual Activity   Drug Use Never     Social History     Tobacco Use   Smoking Status Never Smoker   Smokeless Tobacco Never Used       Family History:   Family History   Problem Relation Age of Onset    No Known Problems Mother     Hypertension Father     Diabetes Father     Diabetes Maternal Grandfather        Medications:  Pertinent medications were reviewed  Current Facility-Administered Medications   Medication Dose Route Frequency Provider Last Rate    acetaminophen  650 mg Oral Q6H PRN Aiden Vernon MD      AMILoride  5 mg Oral Daily Aiden Vernon MD      amitriptyline  50 mg Oral HS Aiden Vernon MD      b complex-vitamin C-folic acid  1 capsule Oral Daily With Dinner Aiden Vernon MD      bisacodyl  10 mg Rectal Daily Estiven Cornejo MD      ferrous sulfate  325 mg Oral Every Other Day Aiden Vernon MD      morphine injection  2 mg Intravenous Q4H PRN Aiden Vernon MD      ondansetron  4 mg Intravenous Q6H PRN Aiden Vernon MD      pantoprazole  40 mg Oral Daily Aiden Vernon MD      potassium chloride  60 mEq Oral 6x Daily Estiven Cornejo MD      sucralfate  1 g Oral Daily Aiden Vernon MD      thiamine  100 mg Oral Daily Aiden Vernon MD      topiramate  25 mg Oral BID Aiden Vernon MD      zinc sulfate  220 mg Oral Daily Aiden Vernon MD           Allergies   Allergen Reactions    Nsaids Other (See Comments)     Other reaction(s): Other (Please comment)  Should not take s/p bariatric surgery  Other reaction(s): Other (See Comments)  Should not take as per prior records  Should not take s/p bariatric surgery  Has hx of gastric bypass      Prednisone      Nausea, vomiting, diarrhea         Vitals:   /68   Pulse 90   Temp 98 5 °F (36 9 °C)   Resp 19   SpO2 100%   There is no height or weight on file to calculate BMI  SpO2: 100 %,   SpO2 Activity: At Rest,   O2 Device: None (Room air)      Intake/Output Summary (Last 24 hours) at 8/21/2020 0843  Last data filed at 8/21/2020 0441  Gross per 24 hour   Intake 440 ml   Output --   Net 440 ml     Invasive Devices     Central Venous Catheter Line            Port A Cath 12/22/18 Right Chest 607 days                Physical Exam  General: conscious, cooperative, in no acute distress  Eyes: conjunctivae pink, anicteric sclerae  ENT: lips and mucous membranes moist  Neck: supple, no JVD, no masses  Chest: clear breath sounds bilateral, no crackles, ronchus or wheezings  CVS: S1 & S2, normal rate, regular rhythm  Abdomen: soft, non-tender, non-distended, normoactive bowel sounds  Extremities: no edema of both legs  Skin: no rash  Neuro: awake, alert, oriented      Diagnostic Data:  Lab: I have personally reviewed pertinent lab results  ,   CBC:  Results from last 7 days   Lab Units 08/17/20  1451   WBC Thousand/uL 8 10   HEMOGLOBIN g/dL 13 5   HEMATOCRIT % 42 1   PLATELETS Thousands/uL 366      CMP:   Lab Results   Component Value Date    SODIUM 137 08/21/2020    K 3 5 08/21/2020     08/21/2020    CO2 22 08/21/2020    BUN 12 08/21/2020    CREATININE 0 96 08/21/2020    CALCIUM 8 2 (L) 08/21/2020    EGFR 76 08/21/2020   ,   PT/INR: No results found for: PT, INR,   Magnesium: No components found for: MAG,  Phosphorous: No results found for: PHOS    Microbiology:  @LABRCNT,(urinecx:7)@        [facility] ARIADNA Hylton    Portions of the record may have been created with voice recognition software  Occasional wrong word or "sound a like" substitutions may have occurred due to the inherent limitations of voice recognition software  Read the chart carefully and recognize, using context, where substitutions have occurred

## 2020-08-21 NOTE — NURSING NOTE
Patient ambulated unassisted to the lobby for discharge  AVS gone over with patient, no meds or belongings with security

## 2020-08-21 NOTE — ANESTHESIA POSTPROCEDURE EVALUATION
Post-Op Assessment Note    CV Status:  Stable    Pain management: adequate     Mental Status:  Alert and awake   Hydration Status:  Euvolemic   PONV Controlled:  Controlled   Airway Patency:  Patent      Post Op Vitals Reviewed: Yes      Staff: CRNA         No complications documented      BP   96/54   Temp      Pulse  88   Resp   12   SpO2   97

## 2020-08-21 NOTE — ANESTHESIA PREPROCEDURE EVALUATION
Procedure:  COLONOSCOPY    Relevant Problems   CARDIO   (+) Chest pain   (+) Migraine without aura and without status migrainosus, not intractable      GI/HEPATIC   (+) GERD (gastroesophageal reflux disease)      /RENAL   (+) Acute kidney injury (HCC)      HEMATOLOGY   (+) Chronic anemia      NEURO/PSYCH   (+) Paresthesia of both lower extremities   (+) Paresthesias      PULMONARY   (+) WAGNER (obstructive sleep apnea)      Other   (+) Cervical lymphadenopathy   (+) History of gastric bypass      History of gastric bypass  Physical Exam    Airway    Mallampati score: II  TM Distance: >3 FB  Neck ROM: full     Dental       Cardiovascular      Pulmonary      Other Findings        Anesthesia Plan  ASA Score- 2     Anesthesia Type- IV sedation with anesthesia with ASA Monitors  Additional Monitors:   Airway Plan:           Plan Factors-    Induction- intravenous  Postoperative Plan-     Informed Consent- Anesthetic plan and risks discussed with patient  I personally reviewed this patient with the CRNA  Discussed and agreed on the Anesthesia Plan with the CRNA  Kelsi Watters

## 2020-08-21 NOTE — CONSULTS
Consultation - GI   Colton Davenport 38 y o  female  [id number]  Unit/Bed:        Physician Requesting Consult:  Susie Aleman MD   Reason for Consult: abdominal pain and abnormal CT    HPI:  Colton Davenport is a 38 y o  female with a history of Roshan-en-Y gastric bypass and hypokalemia, requiring scheduled potassium infusions who presented to the [address] [place] [address] ER 4 days ago with low potassium levels  At the time of admission, she reported left flank pain that radiated into her groin  She was admitted for management of hypokalemia and further evaluation of flank pain  She underwent imaging and there was no evidence for kidney stones  However, she was found to have a dilated large bowel with a sigmoid anastomosis  She underwent a bowel prep overnight but abdominal pain continues  She has had colonoscopies in the past which were unrevealing though her last colonoscopy was aborted due to inability to pass the hepatic flexure  She has had hemorrhoid procedures and lysis of adhesions but no bowel surgeries  Past Medical History:    Past Medical History:   Diagnosis Date    H  pylori infection     Hypokalemia     Migraine     Renal disorder     Rhabdomyolysis         Past Surgical History:    Past Surgical History:   Procedure Laterality Date    ABDOMINAL SURGERY      APPENDECTOMY       SECTION      CHOLECYSTECTOMY      COSMETIC SURGERY      FL GUIDED CENTRAL VENOUS ACCESS DEVICE INSERTION  2018    GASTRIC BYPASS      HYSTERECTOMY      TUNNELED VENOUS PORT PLACEMENT N/A 2018    Procedure: INSERTION VENOUS PORT (PORT-A-CATH);   Surgeon: Elfego Sosa DO;  Location: [facility];  Service: General        Social History:    Social History     Socioeconomic History    Marital status: /Civil Union     Spouse name: Not on file    Number of children: Not on file    Years of education: Not on file    Highest education level: Not on file   Occupational History    Not on file   Social Needs  Financial resource strain: Not on file   Francoise-Mony insecurity     Worry: Not on file     Inability: Not on file   [place] needs     Medical: Not on file     Non-medical: Not on file   Tobacco Use    Smoking status: Never Smoker    Smokeless tobacco: Never Used   Substance and Sexual Activity    Alcohol use: Never     Alcohol/week: 0 0 standard drinks     Frequency: Never     Drinks per session: Patient refused     Binge frequency: Never     Comment: 0    Drug use: Never    Sexual activity: Yes     Partners: Male   Lifestyle    Physical activity     Days per week: Not on file     Minutes per session: Not on file    Stress: Not on file   Relationships    Social connections     Talks on phone: Not on file     Gets together: Not on file     Attends Mu-ism service: Not on file     Active member of club or organization: Not on file     Attends meetings of clubs or organizations: Not on file     Relationship status: Not on file    Intimate partner violence     Fear of current or ex partner: Not on file     Emotionally abused: Not on file     Physically abused: Not on file     Forced sexual activity: Not on file   Other Topics Concern    Not on file   Social History Narrative    Rarely consumes alcohol - As per Grzegorz Bird        Family History:    Family History   Problem Relation Age of Onset    No Known Problems Mother     Hypertension Father     Diabetes Father     Diabetes Maternal Grandfather        Allergies: Allergies   Allergen Reactions    Nsaids Other (See Comments)     Other reaction(s): Other (Please comment)  Should not take s/p bariatric surgery  Other reaction(s): Other (See Comments)  Should not take as per prior records  Should not take s/p bariatric surgery  Has hx of gastric bypass      Prednisone      Nausea, vomiting, diarrhea       Medications Prior to Admission:    No current facility-administered medications on file prior to encounter        Current Outpatient Medications on File Prior to Encounter   Medication Sig Dispense Refill    AMILoride 5 mg tablet Take 1 tablet (5 mg total) by mouth daily 30 tablet 0    amitriptyline (ELAVIL) 50 mg tablet Take 50 mg by mouth daily at bedtime      ergocalciferol (ERGOCALCIFEROL) 1 25 MG (77574 UT) capsule Take 1 capsule (50,000 Units total) by mouth 2 (two) times a week Patient takes this med on Mondays (Patient taking differently: Take 50,000 Units by mouth once a week Mondays and Thursdays) 60 capsule 5    ferrous sulfate 325 (65 Fe) mg tablet Take 325 mg by mouth every other day Last took 3/9/20      other medication, see sig, *Potassium infusions 2x week      pantoprazole (PROTONIX) 40 mg tablet Take 1 tablet (40 mg total) by mouth daily 90 tablet 1    potassium chloride 0 4 mEq/mL Infuse 200 mL (80 mEq total) into a venous catheter 3 (three) times a week 48567 mL 11    potassium chloride 10 % oral solution Take 30 mL (40 mEq total) by mouth 6 (six) times a day 5400 mL 0    sodium bicarbonate 650 mg tablet Take 1 tablet (650 mg total) by mouth 3 (three) times a day 90 tablet 0    sucralfate (CARAFATE) 1 g tablet Take 1 tablet (1 g total) by mouth daily 90 tablet 1    topiramate (TOPAMAX) 50 MG tablet Take 1 tablet (50 mg total) by mouth 2 (two) times a day 60 tablet 0    b complex-C-folic acid (NEPHRO-EDIE) 0 8 mg tablet Take 0 8 mg by mouth daily with dinner      Catheters MISC by Does not apply route 2 (two) times a week Port care per Mcintosh protocol 999 each 11    cyanocobalamin 1,000 mcg/mL Inject 1 mL (1,000 mcg total) into a muscle every 30 (thirty) days 1 mL 12    SUMAtriptan (IMITREX) 100 mg tablet Take 100 mg by mouth once as needed for migraine       Syringe/Needle, Disp, (SYRINGE 3CC/25GX5/8") 25G X 5/8" 3 ML MISC Use once monthly for B12 injection   1 each 11    thiamine 100 MG tablet Take 100 mg by mouth daily      zinc sulfate (ZINCATE) 220 mg capsule Take 1 capsule (220 mg total) by mouth daily 30 capsule 0 Current Medications:    Current Facility-Administered Medications:     acetaminophen (TYLENOL) tablet 650 mg, 650 mg, Oral, Q6H PRN, Felix Wu MD    AMILoride tablet 5 mg, 5 mg, Oral, Daily, Felix Wu MD, Stopped at 08/21/20 0804    amitriptyline (ELAVIL) tablet 50 mg, 50 mg, Oral, HS, Felix Wu MD, 50 mg at 08/20/20 2121    b complex-vitamin C-folic acid (NEPHROCAPS) capsule 1 capsule, 1 capsule, Oral, Daily With Bowen Skaggs MD, 1 capsule at 08/20/20 1556    bisacodyl (DULCOLAX) rectal suppository 10 mg, 10 mg, Rectal, Daily, Shana Hawkisn MD, 10 mg at 08/20/20 0901    ferrous sulfate tablet 325 mg, 325 mg, Oral, Every Other Day, Felix Wu MD, Stopped at 08/21/20 0805    morphine injection 2 mg, 2 mg, Intravenous, Q4H PRN, Felix Wu MD, 2 mg at 08/21/20 0353    ondansetron (ZOFRAN) injection 4 mg, 4 mg, Intravenous, Q6H PRN, Felix Wu MD    pantoprazole (PROTONIX) EC tablet 40 mg, 40 mg, Oral, Daily, Felix Wu MD, Stopped at 08/21/20 0805    potassium chloride (K-DUR,KLOR-CON) CR tablet 60 mEq, 60 mEq, Oral, 6x Daily, Shana Hawkins MD, Stopped at 08/21/20 0805    sucralfate (CARAFATE) tablet 1 g, 1 g, Oral, Daily, Felix Wu MD, Stopped at 08/21/20 0806    thiamine (VITAMIN B1) tablet 100 mg, 100 mg, Oral, Daily, Felix Wu MD, Stopped at 08/21/20 0806    topiramate (TOPAMAX) tablet 25 mg, 25 mg, Oral, BID, Felix Wu MD, Stopped at 08/21/20 0806    zinc sulfate (ZINCATE) capsule 220 mg, 220 mg, Oral, Daily, Felix Wu MD, Stopped at 08/21/20 6220     Review of Systems: negative except for above HPI  Physical Exam:  Vitals:    08/21/20 0729   BP: 114/68   Pulse: 90   Resp: 19   Temp: 98 5 °F (36 9 °C)   SpO2: 100%      HEENT:  Head: Normocephalic, no lesions, without obvious abnormality    CARDIAC:  regular rate and rhythm, S1, S2 normal, no murmur, click, rub or gallop  LUNGS:  normal air entry, lungs clear to auscultation  ABDOMEN:  normal findings: no masses palpable and no organomegaly and abnormal findings: tenderness normal in the entire abdomen  EXTREMITIES:  extremities normal, warm and well-perfused; no cyanosis, clubbing, or edema    Labs:  Component      Latest Ref Rng & Units 8/10/2020 8/17/2020 8/21/2020   WBC      4 31 - 10 16 Thousand/uL  8 10    Red Blood Cell Count      3 81 - 5 12 Million/uL  5 00    Hemoglobin      11 5 - 15 4 g/dL  13 5    HCT      34 8 - 46 1 %  42 1    MCV      82 - 98 fL  84    MCH      26 8 - 34 3 pg  27 0    MCHC      31 4 - 37 4 g/dL  32 1    RDW      11 6 - 15 1 %  18 1 (H)    MPV      8 9 - 12 7 fL  11 1    Platelet Count      124 - 390 Thousands/uL  366    nRBC      /100 WBCs  0    Neutrophils %      43 - 75 %  78 (H)    Immat GRANS %      0 - 2 %  0    Lymphocytes Relative      14 - 44 %  15    Monocytes Relative      4 - 12 %  5    Eosinophils      0 - 6 %  1    Basophils Relative      0 - 1 %  1    Absolute Neutrophils      1 85 - 7 62 Thousands/µL  6 32    Immature Grans Absolute      0 00 - 0 20 Thousand/uL  0 02    Lymphocytes Absolute      0 60 - 4 47 Thousands/µL  1 24    Absolute Monocytes      0 17 - 1 22 Thousand/µL  0 41    Absolute Eosinophils      0 00 - 0 61 Thousand/µL  0 06    Basophils Absolute      0 00 - 0 10 Thousands/µL  0 05    Sodium      136 - 145 mmol/L   137   Potassium      3 5 - 5 3 mmol/L   3 5   Chloride      100 - 108 mmol/L   105   CO2      21 - 32 mmol/L   22   Anion Gap      4 - 13 mmol/L   10   BUN      5 - 25 mg/dL   12   Creatinine      0 60 - 1 30 mg/dL   0 96   Glucose, Random      65 - 140 mg/dL   81   Calcium      8 3 - 10 1 mg/dL   8 2 (L)   eGFR      ml/min/1 73sq m   76   GASTRIN      0 - 115 pg/mL 14     Vasoactive Intest Polypeptide      0 0 - 58 8 pg/mL <16 8     SEROTONIN BLOOD      0 - 420 ng/mL 154     Lipase      73 - 393 u/L  180    Magnesium      1 6 - 2 6 mg/dL  2 5      Imaging:  CT 8/17:  Patient is status post gastric bypass surgery  Distended loops of large bowel appears fluid-filled with postoperative change at the distal sigmoid  No dl obstruction  CT 8/19:  Dilated and markedly redundant colon as seen on the previous study with the mild decreased caliber of the sigmoid colon, as seen on the previous study with no worsening  No evidence of small bowel obstruction  No intra-abdominal fluid collection    Impression:  Bo Cruz is a 38 y o  female with a history of Roshan-en-Y gastric bypass and chronic hypokalemia requiring scheduled potassium infusions presents with abdominal pain and abnormal CT findings  Rule out ileus, slow transit constipation (given history of chronic electrolyte abnormalities), versus intracolonic etiology  Plan:  1  Plan for colonoscopy this afternoon  2   Further recommendations pending clinical course  Thank you for this consultation  We will continue to follow along with you during the patient's hospital course

## 2020-08-21 NOTE — UTILIZATION REVIEW
Continued Stay Review    Date:8/21/20                     Current Patient Class: Inpatient  Current Level of Care: Med/surg    HPI:35 y o  female initially admitted on 8/17    Assessment/Plan:   Continue gradual taper of topamax to avoid rebound migraines  8/21 GI consult: history of Roshan-en-Y gastric bypass and hypokalemia  FOund to have dilated large bowel with sigmoid anastomosis  Underwent bowel prep with continued abdominal pain  Plan for colonoscopy today  Rule out ileus, slow transit constipation   Pertinent Labs/Diagnostic Results:   PROCEDURE NOTE:  8/21/20  Colonoscopy  IMPRESSION:  1  Suboptimal prep  2  Redundant colon  Patient's pain may be due to underlying intermittent constipation/ileus + presence of adhesions from prior surgeries    8/21 Nephrology consult: Had an episode of hyperkalemia 8/20, resolved  Continue potassium sparing diuretic  Potassium today 3 5   INcrease potassium elixir dose  Results from last 7 days   Lab Units 08/17/20  1451   WBC Thousand/uL 8 10   HEMOGLOBIN g/dL 13 5   HEMATOCRIT % 42 1   PLATELETS Thousands/uL 366   NEUTROS ABS Thousands/µL 6 32         Results from last 7 days   Lab Units 08/21/20  0743 08/21/20  0159 08/20/20  1757 08/20/20  1425 08/20/20  1355  08/17/20  1920   SODIUM mmol/L 137 137 135* 135* 134*   < > 134*   POTASSIUM mmol/L 3 5 3 2* 4 0 5 8* 6 5*   < > 2 4*   CHLORIDE mmol/L 105 104 103 106 107   < > 98*   CO2 mmol/L 22 22 22 19* 19*   < > 21   ANION GAP mmol/L 10 11 10 10 8   < > 15*   BUN mg/dL 12 18 17 14 13   < > 15   CREATININE mg/dL 0 96 1 02 1 08 1 19 1 23   < > 1 31*   EGFR ml/min/1 73sq m 76 71 66 59 57   < > 52   CALCIUM mg/dL 8 2* 8 7 8 9 7 9* 8 4   < > 8 6   MAGNESIUM mg/dL  --   --   --   --   --   --  2 5    < > = values in this interval not displayed               Results from last 7 days   Lab Units 08/21/20  0743 08/21/20  0159 08/20/20  1757 08/20/20  1425 08/20/20  1355 08/20/20  1875 08/19/20  1546 08/19/20  0531 08/18/20  1935 08/18/20  0527 08/17/20  1920 08/17/20  0914   GLUCOSE RANDOM mg/dL 81 80 89 82 147* 83 88 91 87 91 98 111       Results from last 7 days   Lab Units 08/20/20  0614   CK TOTAL U/L 47         Results from last 7 days   Lab Units 08/17/20  1451   LIPASE u/L 180       Results from last 7 days   Lab Units 08/17/20  1647   CLARITY UA  Clear   COLOR UA  Yellow   SPEC GRAV UA  1 015   PH UA  6 5   GLUCOSE UA mg/dl Negative   KETONES UA mg/dl Trace*   BLOOD UA  Negative   PROTEIN UA mg/dl Negative   NITRITE UA  Negative   BILIRUBIN UA  Small*   UROBILINOGEN UA E U /dl 0 2   LEUKOCYTES UA  Negative       Vital Signs: *  /Time   Temp   Pulse   Resp   BP   MAP (mmHg)   SpO2   O2 Device   Patient Position - Orthostatic VS    08/21/20 1332   98 3 °F (36 8 °C)   88   16   110/86   --   99 %   None (Room air)   --    08/21/20 1317   98 3 °F (36 8 °C)   81   16   106/54   --   99 %   None (Room air)   --    08/21/20 1156   98 °F (36 7 °C)   81   18   104/56   --   99 %   None (Room air)   --        Medications:   Scheduled Medications:  AMILoride, 5 mg, Oral, Daily  amitriptyline, 50 mg, Oral, HS  b complex-vitamin C-folic acid, 1 capsule, Oral, Daily With Dinner  bisacodyl, 10 mg, Rectal, Daily  ferrous sulfate, 325 mg, Oral, Every Other Day  pantoprazole, 40 mg, Oral, Daily  potassium chloride, 60 mEq, Oral, 6x Daily  potassium chloride, 20 mEq, Intravenous, Once  sucralfate, 1 g, Oral, Daily  thiamine, 100 mg, Oral, Daily  topiramate, 25 mg, Oral, BID  zinc sulfate, 220 mg, Oral, Daily      Continuous IV Infusions:     PRN Meds:  acetaminophen, 650 mg, Oral, Q6H PRN  morphine injection, 2 mg, Intravenous, Q4H PRN  ondansetron, 4 mg, Intravenous, Q6H PRN  ondansetron, 4 mg, Intravenous, Once PRN        Discharge Plan: D    Network Utilization Review Department  [email or web address]  ATTENTION: Please call with any questions or concerns to [phone] and carefully listen to the prompts so that you are directed to the right person  All voicemails are confidential   Ally Ruiz all requests for admission clinical reviews, approved or denied determinations and any other requests to dedicated fax number below belonging to the campus where the patient is receiving treatment   List of dedicated fax numbers for the Facilities:  [address] DENIALS (Administrative/Medical Necessity) [phone]   1000  [facility] (Maternity/NICU/Pediatrics) [phone]   Maria Del Rosario Villatoro [phone]   Jonatan Ribeiro [phone]   Deandra Biswasks [phone]   Joselito Silveira [phone]   [address] [place] [address] [phone]   [facility]  [phone]   [address] [facility] [address] [facility]  [address] [address] [address] [facility] [phone]

## 2020-08-21 NOTE — QUICK NOTE
GI Brief Note:    Colonoscopy performed on 8/21:    IMPRESSION:  1  Suboptimal prep  2  Redundant colon     RECOMMENDATION:  1  Bowel regimen as needed  2  Avoid constipation  3  Replete electrolytes as needed  4  Colonoscopy for screening at age 38   11  Avoid narcotics and anticholinergics  Patient's pain may be due to underlying intermittent constipation/ileus + presence of adhesions from prior surgeries  Stable for discharge from GI standpoint  Outpatient follow up with GI as needed

## 2020-08-24 NOTE — UTILIZATION REVIEW
Notification of Discharge  This is a Notification of Discharge from our facility [address]  Please be advised that this patient has been discharge from our facility  Below you will find the admission and discharge date and time including the patients disposition  PRESENTATION DATE: 8/17/2020 12:37 PM  OBS ADMISSION DATE:   IP ADMISSION DATE: 8/17/20 1237   DISCHARGE DATE: 8/21/2020  5:56 PM  DISPOSITION: Home/Self Care Home/Self Care   Admission Orders listed below:  Admission Orders (From admission, onward)     Ordered        08/17/20 1344  Inpatient Admission  Once                   Please contact the UR Department if additional information is required to close this patient's authorization/case  [address] [employer] Utilization Review Department  Main: [phone] x carefully listen to the prompts  All voicemails are confidential   [email or web address]  Send all requests for admission clinical reviews, approved or denied determinations and any other requests to dedicated fax number below belonging to the campus where the patient is receiving treatment   List of dedicated fax numbers:  [address] DENIALS (Administrative/Medical Necessity) [phone]   [address] (Maternity/NICU/Pediatrics) [phone]   Maria Del Carmen Reeder [phone]   [facility] [phone]   Foster Form [phone]   Teodora Almanza [facility] [address] [facility] [phone]   [facility]  [phone]   [address] [facility]  [address] [address] [address] [facility] [phone]

## 2020-08-25 ENCOUNTER — TRANSCRIBE ORDERS (OUTPATIENT)
Dept: ADMINISTRATIVE | Facility: HOSPITAL | Age: 35
End: 2020-08-25

## 2020-08-25 ENCOUNTER — TELEPHONE (OUTPATIENT)
Dept: NON INVASIVE DIAGNOSTICS | Facility: HOSPITAL | Age: 35
End: 2020-08-25

## 2020-08-25 ENCOUNTER — HOSPITAL ENCOUNTER (OUTPATIENT)
Dept: NON INVASIVE DIAGNOSTICS | Facility: HOSPITAL | Age: 35
Discharge: HOME | End: 2020-08-25
Payer: COMMERCIAL

## 2020-08-25 ENCOUNTER — APPOINTMENT (OUTPATIENT)
Dept: LAB | Facility: HOSPITAL | Age: 35
End: 2020-08-25
Attending: INTERNAL MEDICINE
Payer: COMMERCIAL

## 2020-08-25 DIAGNOSIS — R60.9 EDEMA, UNSPECIFIED TYPE: ICD-10-CM

## 2020-08-25 DIAGNOSIS — E87.6 HYPOKALEMIA: Primary | ICD-10-CM

## 2020-08-25 DIAGNOSIS — R60.0 BILATERAL LEG EDEMA: ICD-10-CM

## 2020-08-25 DIAGNOSIS — M79.89 SWELLING OF BOTH HANDS: ICD-10-CM

## 2020-08-25 DIAGNOSIS — R60.9 EDEMA, UNSPECIFIED TYPE: Primary | ICD-10-CM

## 2020-08-25 DIAGNOSIS — E87.6 HYPOKALEMIA: ICD-10-CM

## 2020-08-25 LAB — NT-PROBNP SERPL-MCNC: 88 PG/ML

## 2020-08-25 PROCEDURE — 83880 ASSAY OF NATRIURETIC PEPTIDE: CPT

## 2020-08-25 PROCEDURE — 93306 TTE W/DOPPLER COMPLETE: CPT

## 2020-08-25 PROCEDURE — 80048 BASIC METABOLIC PNL TOTAL CA: CPT

## 2020-08-25 RX ADMIN — PERFLUTREN 1 ML/MIN: 6.52 INJECTION, SUSPENSION INTRAVENOUS at 15:07

## 2020-08-25 NOTE — TELEPHONE ENCOUNTER
Spoke with Paul Hammond RN at PCP office (Dr Peggy Ayala)  Reviewed results of echo verbally with her and faxed report to their office  (Fax number [phone])

## 2020-08-31 ENCOUNTER — APPOINTMENT (OUTPATIENT)
Dept: LAB | Facility: HOSPITAL | Age: 35
End: 2020-08-31
Attending: FAMILY MEDICINE
Payer: COMMERCIAL

## 2020-08-31 DIAGNOSIS — E87.6 HYPOKALEMIA: ICD-10-CM

## 2020-09-16 ENCOUNTER — TELEPHONE (OUTPATIENT)
Dept: NEPHROLOGY | Facility: CLINIC | Age: 35
End: 2020-09-16

## 2020-09-16 ENCOUNTER — HOSPITAL ENCOUNTER (INPATIENT)
Facility: HOSPITAL | Age: 35
LOS: 3 days | Discharge: HOME | DRG: 641 | End: 2020-09-20
Attending: FAMILY MEDICINE | Admitting: STUDENT IN AN ORGANIZED HEALTH CARE EDUCATION/TRAINING PROGRAM
Payer: COMMERCIAL

## 2020-09-16 ENCOUNTER — APPOINTMENT (OUTPATIENT)
Dept: LAB | Facility: HOSPITAL | Age: 35
End: 2020-09-16
Attending: INTERNAL MEDICINE
Payer: COMMERCIAL

## 2020-09-16 DIAGNOSIS — R10.32 LEFT LOWER QUADRANT ABDOMINAL PAIN: ICD-10-CM

## 2020-09-16 DIAGNOSIS — E87.6 HYPOKALEMIA: Primary | ICD-10-CM

## 2020-09-16 DIAGNOSIS — Z98.84 HISTORY OF GASTRIC BYPASS: ICD-10-CM

## 2020-09-16 LAB
ALBUMIN SERPL BCP-MCNC: 3.3 G/DL (ref 3.5–5)
ALP SERPL-CCNC: 55 U/L (ref 46–116)
ALT SERPL W P-5'-P-CCNC: 29 U/L (ref 12–78)
ANION GAP SERPL CALCULATED.3IONS-SCNC: 15 MMOL/L (ref 4–13)
AST SERPL W P-5'-P-CCNC: 20 U/L (ref 5–45)
BACTERIA UR QL AUTO: ABNORMAL /HPF
BASOPHILS # BLD AUTO: 0.05 THOUSANDS/ΜL (ref 0–0.1)
BASOPHILS NFR BLD AUTO: 1 % (ref 0–1)
BILIRUB SERPL-MCNC: 0.19 MG/DL (ref 0.2–1)
BILIRUB UR QL STRIP: ABNORMAL
BUN SERPL-MCNC: 11 MG/DL (ref 5–25)
CALCIUM ALBUM COR SERPL-MCNC: 8.8 MG/DL (ref 8.3–10.1)
CALCIUM SERPL-MCNC: 8.2 MG/DL (ref 8.3–10.1)
CHLORIDE SERPL-SCNC: 108 MMOL/L (ref 100–108)
CLARITY UR: ABNORMAL
CO2 SERPL-SCNC: 19 MMOL/L (ref 21–32)
COLOR UR: YELLOW
CREAT SERPL-MCNC: 0.99 MG/DL (ref 0.6–1.3)
EOSINOPHIL # BLD AUTO: 0.13 THOUSAND/ΜL (ref 0–0.61)
EOSINOPHIL NFR BLD AUTO: 2 % (ref 0–6)
ERYTHROCYTE [DISTWIDTH] IN BLOOD BY AUTOMATED COUNT: 18.1 % (ref 11.6–15.1)
GFR SERPL CREATININE-BSD FRML MDRD: 74 ML/MIN/1.73SQ M
GLUCOSE SERPL-MCNC: 78 MG/DL (ref 65–140)
GLUCOSE UR STRIP-MCNC: NEGATIVE MG/DL
HCT VFR BLD AUTO: 32.5 % (ref 34.8–46.1)
HGB BLD-MCNC: 10.4 G/DL (ref 11.5–15.4)
HGB UR QL STRIP.AUTO: NEGATIVE
IMM GRANULOCYTES # BLD AUTO: 0.01 THOUSAND/UL (ref 0–0.2)
IMM GRANULOCYTES NFR BLD AUTO: 0 % (ref 0–2)
KETONES UR STRIP-MCNC: ABNORMAL MG/DL
LACTATE SERPL-SCNC: 0.5 MMOL/L (ref 0.5–2)
LEUKOCYTE ESTERASE UR QL STRIP: NEGATIVE
LYMPHOCYTES # BLD AUTO: 2.15 THOUSANDS/ΜL (ref 0.6–4.47)
LYMPHOCYTES NFR BLD AUTO: 36 % (ref 14–44)
MAGNESIUM SERPL-MCNC: 1.9 MG/DL (ref 1.6–2.6)
MCH RBC QN AUTO: 27.7 PG (ref 26.8–34.3)
MCHC RBC AUTO-ENTMCNC: 32 G/DL (ref 31.4–37.4)
MCV RBC AUTO: 86 FL (ref 82–98)
MONOCYTES # BLD AUTO: 0.46 THOUSAND/ΜL (ref 0.17–1.22)
MONOCYTES NFR BLD AUTO: 8 % (ref 4–12)
NEUTROPHILS # BLD AUTO: 3.18 THOUSANDS/ΜL (ref 1.85–7.62)
NEUTS SEG NFR BLD AUTO: 53 % (ref 43–75)
NITRITE UR QL STRIP: NEGATIVE
NON-SQ EPI CELLS URNS QL MICRO: ABNORMAL /HPF
NRBC BLD AUTO-RTO: 0 /100 WBCS
PH UR STRIP.AUTO: 6 [PH]
PLATELET # BLD AUTO: 270 THOUSANDS/UL (ref 149–390)
PMV BLD AUTO: 10.7 FL (ref 8.9–12.7)
POTASSIUM SERPL-SCNC: 2.4 MMOL/L (ref 3.5–5.3)
PROT SERPL-MCNC: 6.5 G/DL (ref 6.4–8.2)
PROT UR STRIP-MCNC: ABNORMAL MG/DL
RBC # BLD AUTO: 3.76 MILLION/UL (ref 3.81–5.12)
RBC #/AREA URNS AUTO: ABNORMAL /HPF
SODIUM SERPL-SCNC: 142 MMOL/L (ref 136–145)
SP GR UR STRIP.AUTO: >=1.03 (ref 1–1.03)
UROBILINOGEN UR QL STRIP.AUTO: 0.2 E.U./DL
WBC # BLD AUTO: 5.98 THOUSAND/UL (ref 4.31–10.16)
WBC #/AREA URNS AUTO: ABNORMAL /HPF

## 2020-09-16 PROCEDURE — 83605 ASSAY OF LACTIC ACID: CPT | Performed by: PHYSICIAN ASSISTANT

## 2020-09-16 PROCEDURE — 85025 COMPLETE CBC W/AUTO DIFF WBC: CPT | Performed by: PHYSICIAN ASSISTANT

## 2020-09-16 PROCEDURE — 99284 EMERGENCY DEPT VISIT MOD MDM: CPT

## 2020-09-16 PROCEDURE — 81001 URINALYSIS AUTO W/SCOPE: CPT | Performed by: PHYSICIAN ASSISTANT

## 2020-09-16 PROCEDURE — 93005 ELECTROCARDIOGRAM TRACING: CPT

## 2020-09-16 PROCEDURE — 99223 1ST HOSP IP/OBS HIGH 75: CPT | Performed by: NURSE PRACTITIONER

## 2020-09-16 PROCEDURE — 96374 THER/PROPH/DIAG INJ IV PUSH: CPT

## 2020-09-16 PROCEDURE — 80053 COMPREHEN METABOLIC PANEL: CPT | Performed by: PHYSICIAN ASSISTANT

## 2020-09-16 PROCEDURE — 83735 ASSAY OF MAGNESIUM: CPT | Performed by: PHYSICIAN ASSISTANT

## 2020-09-16 RX ORDER — AMITRIPTYLINE HYDROCHLORIDE 25 MG/1
50 TABLET, FILM COATED ORAL
Status: DISCONTINUED | OUTPATIENT
Start: 2020-09-16 | End: 2020-09-20 | Stop reason: HOSPADM

## 2020-09-16 RX ORDER — POLYETHYLENE GLYCOL 3350 17 G/17G
17 POWDER, FOR SOLUTION ORAL DAILY
Status: DISCONTINUED | OUTPATIENT
Start: 2020-09-17 | End: 2020-09-20 | Stop reason: HOSPADM

## 2020-09-16 RX ORDER — POTASSIUM CHLORIDE 14.9 MG/ML
20 INJECTION INTRAVENOUS
Status: COMPLETED | OUTPATIENT
Start: 2020-09-16 | End: 2020-09-17

## 2020-09-16 RX ORDER — ACETAMINOPHEN 325 MG/1
650 TABLET ORAL EVERY 6 HOURS PRN
Status: DISCONTINUED | OUTPATIENT
Start: 2020-09-16 | End: 2020-09-20 | Stop reason: HOSPADM

## 2020-09-16 RX ORDER — CYCLOBENZAPRINE HCL 10 MG
10 TABLET ORAL 3 TIMES DAILY
Status: DISCONTINUED | OUTPATIENT
Start: 2020-09-16 | End: 2020-09-20 | Stop reason: HOSPADM

## 2020-09-16 RX ORDER — CYCLOBENZAPRINE HCL 10 MG
10 TABLET ORAL 3 TIMES DAILY
COMMUNITY
End: 2021-06-19 | Stop reason: HOSPADM

## 2020-09-16 RX ORDER — SODIUM CHLORIDE 9 MG/ML
75 INJECTION, SOLUTION INTRAVENOUS CONTINUOUS
Status: DISCONTINUED | OUTPATIENT
Start: 2020-09-16 | End: 2020-09-18

## 2020-09-16 RX ORDER — ONDANSETRON 4 MG/1
4 TABLET, FILM COATED ORAL EVERY 8 HOURS PRN
COMMUNITY
End: 2020-10-01 | Stop reason: HOSPADM

## 2020-09-16 RX ORDER — TOPIRAMATE 25 MG/1
25 TABLET ORAL 2 TIMES DAILY
Status: DISCONTINUED | OUTPATIENT
Start: 2020-09-16 | End: 2020-09-18

## 2020-09-16 RX ORDER — ONDANSETRON 2 MG/ML
4 INJECTION INTRAMUSCULAR; INTRAVENOUS EVERY 6 HOURS PRN
Status: DISCONTINUED | OUTPATIENT
Start: 2020-09-16 | End: 2020-09-20 | Stop reason: HOSPADM

## 2020-09-16 RX ORDER — FERROUS SULFATE 325(65) MG
325 TABLET ORAL EVERY OTHER DAY
Status: DISCONTINUED | OUTPATIENT
Start: 2020-09-16 | End: 2020-09-20 | Stop reason: HOSPADM

## 2020-09-16 RX ORDER — POTASSIUM CHLORIDE 29.8 MG/ML
40 INJECTION INTRAVENOUS ONCE
Status: DISCONTINUED | OUTPATIENT
Start: 2020-09-16 | End: 2020-09-16

## 2020-09-16 RX ORDER — POTASSIUM CHLORIDE 20 MEQ/1
40 TABLET, EXTENDED RELEASE ORAL ONCE
Status: COMPLETED | OUTPATIENT
Start: 2020-09-16 | End: 2020-09-16

## 2020-09-16 RX ORDER — OXYCODONE HYDROCHLORIDE 5 MG/1
5 TABLET ORAL EVERY 4 HOURS PRN
Status: DISCONTINUED | OUTPATIENT
Start: 2020-09-16 | End: 2020-09-20 | Stop reason: HOSPADM

## 2020-09-16 RX ORDER — POTASSIUM CHLORIDE 20MEQ/15ML
60 LIQUID (ML) ORAL
Status: DISCONTINUED | OUTPATIENT
Start: 2020-09-16 | End: 2020-09-18

## 2020-09-16 RX ORDER — AMILORIDE HYDROCHLORIDE 5 MG/1
5 TABLET ORAL DAILY
Status: DISCONTINUED | OUTPATIENT
Start: 2020-09-17 | End: 2020-09-20 | Stop reason: HOSPADM

## 2020-09-16 RX ORDER — HYDROMORPHONE HCL/PF 1 MG/ML
0.5 SYRINGE (ML) INJECTION EVERY 4 HOURS PRN
Status: DISCONTINUED | OUTPATIENT
Start: 2020-09-16 | End: 2020-09-20 | Stop reason: HOSPADM

## 2020-09-16 RX ADMIN — CYCLOBENZAPRINE HYDROCHLORIDE 10 MG: 10 TABLET, FILM COATED ORAL at 21:14

## 2020-09-16 RX ADMIN — SODIUM CHLORIDE 1000 ML: 0.9 INJECTION, SOLUTION INTRAVENOUS at 19:24

## 2020-09-16 RX ADMIN — POTASSIUM CHLORIDE 20 MEQ: 14.9 INJECTION, SOLUTION INTRAVENOUS at 22:22

## 2020-09-16 RX ADMIN — POTASSIUM CHLORIDE 60 MEQ: 20 SOLUTION ORAL at 21:14

## 2020-09-16 RX ADMIN — TOPIRAMATE 25 MG: 25 TABLET, FILM COATED ORAL at 21:14

## 2020-09-16 RX ADMIN — POTASSIUM CHLORIDE 40 MEQ: 1500 TABLET, EXTENDED RELEASE ORAL at 19:35

## 2020-09-16 RX ADMIN — FERROUS SULFATE TAB 325 MG (65 MG ELEMENTAL FE) 325 MG: 325 (65 FE) TAB at 21:14

## 2020-09-16 RX ADMIN — HYDROMORPHONE HYDROCHLORIDE 0.5 MG: 1 INJECTION, SOLUTION INTRAMUSCULAR; INTRAVENOUS; SUBCUTANEOUS at 22:21

## 2020-09-16 RX ADMIN — POTASSIUM CHLORIDE 20 MEQ: 14.9 INJECTION, SOLUTION INTRAVENOUS at 19:42

## 2020-09-16 RX ADMIN — ONDANSETRON 4 MG: 2 INJECTION INTRAMUSCULAR; INTRAVENOUS at 22:21

## 2020-09-16 RX ADMIN — SODIUM CHLORIDE 75 ML/HR: 0.9 INJECTION, SOLUTION INTRAVENOUS at 22:21

## 2020-09-16 RX ADMIN — AMITRIPTYLINE HYDROCHLORIDE 50 MG: 25 TABLET, FILM COATED ORAL at 21:14

## 2020-09-16 NOTE — DISCHARGE INSTRUCTIONS
Abdominal Pain   WHAT YOU NEED TO KNOW:   Abdominal pain can be dull, achy, or sharp  You may have pain in one area of your abdomen, or in your entire abdomen  Your pain may be caused by a condition such as constipation, food sensitivity or poisoning, infection, or a blockage  Abdominal pain can also be from a hernia, appendicitis, or an ulcer  Liver, gallbladder, or kidney conditions can also cause abdominal pain  The cause of your abdominal pain may be unknown  DISCHARGE INSTRUCTIONS:   Return to the emergency department if:   · You have new chest pain or shortness of breath  · You have pulsing pain in your upper abdomen or lower back that suddenly becomes constant  · Your pain is in the right lower abdominal area and worsens with movement  · You have a fever over 100 4°F (38°C) or shaking chills  · You are vomiting and cannot keep food or liquids down  · Your pain does not improve or gets worse over the next 8 to 12 hours  · You see blood in your vomit or bowel movements, or they look black and tarry  · Your skin or the whites of your eyes turn yellow  · You are a woman and have a large amount of vaginal bleeding that is not your monthly period  Contact your healthcare provider if:   · You have pain in your lower back  · You are a man and have pain in your testicles  · You have pain when you urinate  · You have questions or concerns about your condition or care  Follow up with your healthcare provider within 24 hours or as directed:  Write down your questions so you remember to ask them during your visits  Medicines:   · Medicines  may be given to calm your stomach and prevent vomiting or to decrease pain  Ask how to take pain medicine safely  · Take your medicine as directed  Contact your healthcare provider if you think your medicine is not helping or if you have side effects  Tell him of her if you are allergic to any medicine   Keep a list of the medicines, vitamins, and herbs you take  Include the amounts, and when and why you take them  Bring the list or the pill bottles to follow-up visits  Carry your medicine list with you in case of an emergency  © 2017 [address] [employer] Information is for End User's use only and may not be sold, redistributed or otherwise used for commercial purposes  All illustrations and images included in CareNotes® are the copyrighted property of A D A M , Inc  or Iftikhar Rogers  The above information is an  only  It is not intended as medical advice for individual conditions or treatments  Talk to your doctor, nurse or pharmacist before following any medical regimen to see if it is safe and effective for you  Hypokalemia   WHAT YOU NEED TO KNOW:   Hypokalemia is a low level of potassium in your blood  Potassium helps control how your muscles, heart, and digestive system work  Hypokalemia occurs when your body loses too much potassium or does not absorb enough from food  DISCHARGE INSTRUCTIONS:   Seek care immediately if:   · You cannot move your arm or leg      · You have a fast or irregular heartbeat      · You are too tired or weak to stand up  Contact your healthcare provider if:   · You are vomiting, or you have diarrhea      · You have numbness or tingling in your arms or legs      · Your symptoms do not go away or they get worse      · You have questions or concerns about your condition or care  Medicines:   · Potassium will be given to bring your potassium levels back to normal      · Take your medicine as directed  Contact your healthcare provider if you think your medicine is not helping or if you have side effects  Tell him of her if you are allergic to any medicine  Keep a list of the medicines, vitamins, and herbs you take  Include the amounts, and when and why you take them  Bring the list or the pill bottles to follow-up visits   Carry your medicine list with you in case of an emergency  Eat foods that are high in potassium: Foods that are high in potassium include bananas, oranges, tomatoes, potatoes, and avocado  Kang beans, turkey, salmon, lean beef, yogurt, and milk are also high in potassium  Ask your healthcare provider or dietitian for more information about foods that are high in potassium  Follow up with your healthcare provider as directed: Write down your questions so you remember to ask them during your visits  © 2017 [address] [facility] Information is for End User's use only and may not be sold, redistributed or otherwise used for commercial purposes  All illustrations and images included in CareNotes® are the copyrighted property of A D A M , Inc  or Iftikhar Rogers  The above information is an  only  It is not intended as medical advice for individual conditions or treatments  Talk to your doctor, nurse or pharmacist before following any medical regimen to see if it is safe and effective for you  While you are stable for discharge at this time, your symptoms may change  Please seek immediate medical attention for any concerning symptoms  Please follow-up with nephrology and Gastroenterology  Clinic numbers have been provided for you to call to set an appointment with them within the next 1-2 weeks

## 2020-09-16 NOTE — ED NOTES
Patient resting in bed comfortably with call bell in reach  Patient does not appear to be in any distress at this time   Will continue to monitor      Nila Montesinos RN  09/16/20 2013

## 2020-09-16 NOTE — TELEPHONE ENCOUNTER
Call from [facility], critical lab - Potassium 2 4  Tiger texted Dr Jere Toth who stated if patient is not feeling well she should go to the ED  Called and spoke to patient, she stated she is feeling crappy  Instructed patient to go to ED  She will be going to [facility] [employer]

## 2020-09-17 LAB
ANION GAP SERPL CALCULATED.3IONS-SCNC: 11 MMOL/L (ref 4–13)
ANION GAP SERPL CALCULATED.3IONS-SCNC: 11 MMOL/L (ref 4–13)
ANION GAP SERPL CALCULATED.3IONS-SCNC: 9 MMOL/L (ref 4–13)
BASOPHILS # BLD AUTO: 0.04 THOUSANDS/ΜL (ref 0–0.1)
BASOPHILS NFR BLD AUTO: 1 % (ref 0–1)
BUN SERPL-MCNC: 11 MG/DL (ref 5–25)
BUN SERPL-MCNC: 12 MG/DL (ref 5–25)
BUN SERPL-MCNC: 9 MG/DL (ref 5–25)
CALCIUM SERPL-MCNC: 7.8 MG/DL (ref 8.3–10.1)
CHLORIDE SERPL-SCNC: 108 MMOL/L (ref 100–108)
CHLORIDE SERPL-SCNC: 110 MMOL/L (ref 100–108)
CHLORIDE SERPL-SCNC: 111 MMOL/L (ref 100–108)
CO2 SERPL-SCNC: 16 MMOL/L (ref 21–32)
CO2 SERPL-SCNC: 18 MMOL/L (ref 21–32)
CO2 SERPL-SCNC: 19 MMOL/L (ref 21–32)
CREAT SERPL-MCNC: 0.86 MG/DL (ref 0.6–1.3)
CREAT SERPL-MCNC: 0.93 MG/DL (ref 0.6–1.3)
CREAT SERPL-MCNC: 0.94 MG/DL (ref 0.6–1.3)
EOSINOPHIL # BLD AUTO: 0.12 THOUSAND/ΜL (ref 0–0.61)
EOSINOPHIL NFR BLD AUTO: 2 % (ref 0–6)
ERYTHROCYTE [DISTWIDTH] IN BLOOD BY AUTOMATED COUNT: 18.2 % (ref 11.6–15.1)
GFR SERPL CREATININE-BSD FRML MDRD: 78 ML/MIN/1.73SQ M
GFR SERPL CREATININE-BSD FRML MDRD: 79 ML/MIN/1.73SQ M
GFR SERPL CREATININE-BSD FRML MDRD: 87 ML/MIN/1.73SQ M
GLUCOSE P FAST SERPL-MCNC: 69 MG/DL (ref 65–99)
GLUCOSE P FAST SERPL-MCNC: 79 MG/DL (ref 65–99)
GLUCOSE SERPL-MCNC: 69 MG/DL (ref 65–140)
GLUCOSE SERPL-MCNC: 79 MG/DL (ref 65–140)
GLUCOSE SERPL-MCNC: 98 MG/DL (ref 65–140)
HCT VFR BLD AUTO: 32.7 % (ref 34.8–46.1)
HGB BLD-MCNC: 10.4 G/DL (ref 11.5–15.4)
IMM GRANULOCYTES # BLD AUTO: 0.01 THOUSAND/UL (ref 0–0.2)
IMM GRANULOCYTES NFR BLD AUTO: 0 % (ref 0–2)
LYMPHOCYTES # BLD AUTO: 1.76 THOUSANDS/ΜL (ref 0.6–4.47)
LYMPHOCYTES NFR BLD AUTO: 35 % (ref 14–44)
MCH RBC QN AUTO: 27.7 PG (ref 26.8–34.3)
MCHC RBC AUTO-ENTMCNC: 31.8 G/DL (ref 31.4–37.4)
MCV RBC AUTO: 87 FL (ref 82–98)
MONOCYTES # BLD AUTO: 0.41 THOUSAND/ΜL (ref 0.17–1.22)
MONOCYTES NFR BLD AUTO: 8 % (ref 4–12)
NEUTROPHILS # BLD AUTO: 2.71 THOUSANDS/ΜL (ref 1.85–7.62)
NEUTS SEG NFR BLD AUTO: 54 % (ref 43–75)
NRBC BLD AUTO-RTO: 0 /100 WBCS
PLATELET # BLD AUTO: 264 THOUSANDS/UL (ref 149–390)
PMV BLD AUTO: 11.2 FL (ref 8.9–12.7)
POTASSIUM SERPL-SCNC: 2.7 MMOL/L (ref 3.5–5.3)
POTASSIUM SERPL-SCNC: 2.9 MMOL/L (ref 3.5–5.3)
POTASSIUM SERPL-SCNC: 3.7 MMOL/L (ref 3.5–5.3)
RBC # BLD AUTO: 3.75 MILLION/UL (ref 3.81–5.12)
SODIUM SERPL-SCNC: 135 MMOL/L (ref 136–145)
SODIUM SERPL-SCNC: 139 MMOL/L (ref 136–145)
SODIUM SERPL-SCNC: 139 MMOL/L (ref 136–145)
WBC # BLD AUTO: 5.05 THOUSAND/UL (ref 4.31–10.16)

## 2020-09-17 PROCEDURE — 99232 SBSQ HOSP IP/OBS MODERATE 35: CPT | Performed by: STUDENT IN AN ORGANIZED HEALTH CARE EDUCATION/TRAINING PROGRAM

## 2020-09-17 PROCEDURE — 99285 EMERGENCY DEPT VISIT HI MDM: CPT | Performed by: PHYSICIAN ASSISTANT

## 2020-09-17 PROCEDURE — 80048 BASIC METABOLIC PNL TOTAL CA: CPT | Performed by: NURSE PRACTITIONER

## 2020-09-17 PROCEDURE — 80048 BASIC METABOLIC PNL TOTAL CA: CPT | Performed by: STUDENT IN AN ORGANIZED HEALTH CARE EDUCATION/TRAINING PROGRAM

## 2020-09-17 PROCEDURE — 85025 COMPLETE CBC W/AUTO DIFF WBC: CPT | Performed by: NURSE PRACTITIONER

## 2020-09-17 RX ORDER — POTASSIUM CHLORIDE 29.8 MG/ML
40 INJECTION INTRAVENOUS ONCE
Status: DISCONTINUED | OUTPATIENT
Start: 2020-09-17 | End: 2020-09-17 | Stop reason: RX

## 2020-09-17 RX ORDER — POTASSIUM CHLORIDE 14.9 MG/ML
20 INJECTION INTRAVENOUS
Status: COMPLETED | OUTPATIENT
Start: 2020-09-17 | End: 2020-09-17

## 2020-09-17 RX ORDER — POTASSIUM CHLORIDE 14.9 MG/ML
20 INJECTION INTRAVENOUS
Status: COMPLETED | OUTPATIENT
Start: 2020-09-17 | End: 2020-09-18

## 2020-09-17 RX ORDER — POTASSIUM CHLORIDE 20 MEQ/1
40 TABLET, EXTENDED RELEASE ORAL ONCE
Status: COMPLETED | OUTPATIENT
Start: 2020-09-17 | End: 2020-09-17

## 2020-09-17 RX ADMIN — POTASSIUM CHLORIDE 60 MEQ: 20 SOLUTION ORAL at 08:52

## 2020-09-17 RX ADMIN — POTASSIUM CHLORIDE 20 MEQ: 14.9 INJECTION, SOLUTION INTRAVENOUS at 02:39

## 2020-09-17 RX ADMIN — POTASSIUM CHLORIDE 60 MEQ: 20 SOLUTION ORAL at 11:23

## 2020-09-17 RX ADMIN — POTASSIUM CHLORIDE 60 MEQ: 20 SOLUTION ORAL at 22:25

## 2020-09-17 RX ADMIN — TOPIRAMATE 25 MG: 25 TABLET, FILM COATED ORAL at 08:21

## 2020-09-17 RX ADMIN — HYDROMORPHONE HYDROCHLORIDE 0.5 MG: 1 INJECTION, SOLUTION INTRAMUSCULAR; INTRAVENOUS; SUBCUTANEOUS at 17:09

## 2020-09-17 RX ADMIN — POTASSIUM CHLORIDE 60 MEQ: 20 SOLUTION ORAL at 19:59

## 2020-09-17 RX ADMIN — HYDROMORPHONE HYDROCHLORIDE 0.5 MG: 1 INJECTION, SOLUTION INTRAMUSCULAR; INTRAVENOUS; SUBCUTANEOUS at 21:34

## 2020-09-17 RX ADMIN — HYDROMORPHONE HYDROCHLORIDE 0.5 MG: 1 INJECTION, SOLUTION INTRAMUSCULAR; INTRAVENOUS; SUBCUTANEOUS at 12:15

## 2020-09-17 RX ADMIN — POTASSIUM CHLORIDE 40 MEQ: 1500 TABLET, EXTENDED RELEASE ORAL at 22:25

## 2020-09-17 RX ADMIN — AMITRIPTYLINE HYDROCHLORIDE 50 MG: 25 TABLET, FILM COATED ORAL at 21:29

## 2020-09-17 RX ADMIN — POTASSIUM CHLORIDE 60 MEQ: 20 SOLUTION ORAL at 12:49

## 2020-09-17 RX ADMIN — CYCLOBENZAPRINE HYDROCHLORIDE 10 MG: 10 TABLET, FILM COATED ORAL at 17:07

## 2020-09-17 RX ADMIN — POTASSIUM CHLORIDE 60 MEQ: 20 SOLUTION ORAL at 17:07

## 2020-09-17 RX ADMIN — POLYETHYLENE GLYCOL 3350 17 G: 17 POWDER, FOR SOLUTION ORAL at 08:21

## 2020-09-17 RX ADMIN — POTASSIUM CHLORIDE 20 MEQ: 14.9 INJECTION, SOLUTION INTRAVENOUS at 22:25

## 2020-09-17 RX ADMIN — CYCLOBENZAPRINE HYDROCHLORIDE 10 MG: 10 TABLET, FILM COATED ORAL at 21:29

## 2020-09-17 RX ADMIN — AMILORIDE HYDROCHLORIDE 5 MG: 5 TABLET ORAL at 08:25

## 2020-09-17 RX ADMIN — POTASSIUM CHLORIDE 20 MEQ: 14.9 INJECTION, SOLUTION INTRAVENOUS at 04:41

## 2020-09-17 RX ADMIN — ONDANSETRON 4 MG: 2 INJECTION INTRAMUSCULAR; INTRAVENOUS at 08:21

## 2020-09-17 RX ADMIN — TOPIRAMATE 25 MG: 25 TABLET, FILM COATED ORAL at 17:07

## 2020-09-17 RX ADMIN — HYDROMORPHONE HYDROCHLORIDE 0.5 MG: 1 INJECTION, SOLUTION INTRAMUSCULAR; INTRAVENOUS; SUBCUTANEOUS at 08:22

## 2020-09-17 RX ADMIN — CYCLOBENZAPRINE HYDROCHLORIDE 10 MG: 10 TABLET, FILM COATED ORAL at 08:21

## 2020-09-17 RX ADMIN — ONDANSETRON 4 MG: 2 INJECTION INTRAMUSCULAR; INTRAVENOUS at 17:09

## 2020-09-17 RX ADMIN — SODIUM CHLORIDE 75 ML/HR: 0.9 INJECTION, SOLUTION INTRAVENOUS at 11:26

## 2020-09-17 NOTE — PLAN OF CARE
Problem: Potential for Falls  Goal: Patient will remain free of falls  Description: INTERVENTIONS:  - Assess patient frequently for physical needs  -  Identify cognitive and physical deficits and behaviors that affect risk of falls  -  [place] fall precautions as indicated by assessment   - Educate patient/family on patient safety including physical limitations  - Instruct patient to call for assistance with activity based on assessment  - Modify environment to reduce risk of injury  - Consider OT/PT consult to assist with strengthening/mobility  Outcome: Progressing     Problem: Nutrition/Hydration-ADULT  Goal: Nutrient/Hydration intake appropriate for improving, restoring or maintaining nutritional needs  Description: Monitor and assess patient's nutrition/hydration status for malnutrition  Collaborate with interdisciplinary team and initiate plan and interventions as ordered  Monitor patient's weight and dietary intake as ordered or per policy  Utilize nutrition screening tool and intervene as necessary  Determine patient's food preferences and provide high-protein, high-caloric foods as appropriate       INTERVENTIONS:  - Monitor oral intake, urinary output, labs, and treatment plans  - Assess nutrition and hydration status and recommend course of action  - Evaluate amount of meals eaten  - Assist patient with eating if necessary   - Allow adequate time for meals  - Recommend/ encourage appropriate diets, oral nutritional supplements, and vitamin/mineral supplements  - Order, calculate, and assess calorie counts as needed  - Recommend, monitor, and adjust tube feedings and TPN/PPN based on assessed needs  - Assess need for intravenous fluids  - Provide specific nutrition/hydration education as appropriate  - Include patient/family/caregiver in decisions related to nutrition  Outcome: Progressing     Problem: PAIN - ADULT  Goal: Verbalizes/displays adequate comfort level or baseline comfort level  Description: Interventions:  - Encourage patient to monitor pain and request assistance  - Assess pain using appropriate pain scale  - Administer analgesics based on type and severity of pain and evaluate response  - Implement non-pharmacological measures as appropriate and evaluate response  - Consider cultural and social influences on pain and pain management  - Notify physician/advanced practitioner if interventions unsuccessful or patient reports new pain  Outcome: Progressing     Problem: INFECTION - ADULT  Goal: Absence or prevention of progression during hospitalization  Description: INTERVENTIONS:  - Assess and monitor for signs and symptoms of infection  - Monitor lab/diagnostic results  - Monitor all insertion sites, i e  indwelling lines, tubes, and drains  - Monitor endotracheal if appropriate and nasal secretions for changes in amount and color  - [place] appropriate cooling/warming therapies per order  - Administer medications as ordered  - Instruct and encourage patient and family to use good hand hygiene technique  - Identify and instruct in appropriate isolation precautions for identified infection/condition  Outcome: Progressing  Goal: Absence of fever/infection during neutropenic period  Description: INTERVENTIONS:  - Monitor WBC    Outcome: Progressing     Problem: SAFETY ADULT  Goal: Patient will remain free of falls  Description: INTERVENTIONS:  - Assess patient frequently for physical needs  -  Identify cognitive and physical deficits and behaviors that affect risk of falls    -  [place] fall precautions as indicated by assessment   - Educate patient/family on patient safety including physical limitations  - Instruct patient to call for assistance with activity based on assessment  - Modify environment to reduce risk of injury  - Consider OT/PT consult to assist with strengthening/mobility  Outcome: Progressing  Goal: Maintain or return to baseline ADL function  Description: INTERVENTIONS:  -  Assess patient's ability to carry out ADLs; assess patient's baseline for ADL function and identify physical deficits which impact ability to perform ADLs (bathing, care of mouth/teeth, toileting, grooming, dressing, etc )  - Assess/evaluate cause of self-care deficits   - Assess range of motion  - Assess patient's mobility; develop plan if impaired  - Assess patient's need for assistive devices and provide as appropriate  - Encourage maximum independence but intervene and supervise when necessary  - Involve family in performance of ADLs  - Assess for home care needs following discharge   - Consider OT consult to assist with ADL evaluation and planning for discharge  - Provide patient education as appropriate  Outcome: Progressing  Goal: Maintain or return mobility status to optimal level  Description: INTERVENTIONS:  - Assess patient's baseline mobility status (ambulation, transfers, stairs, etc )    - Identify cognitive and physical deficits and behaviors that affect mobility  - Identify mobility aids required to assist with transfers and/or ambulation (gait belt, sit-to-stand, lift, walker, cane, etc )  - [place] fall precautions as indicated by assessment  - Record patient progress and toleration of activity level on Mobility SBAR; progress patient to next Phase/Stage  - Instruct patient to call for assistance with activity based on assessment  - Consider rehabilitation consult to assist with strengthening/weightbearing, etc   Outcome: Progressing     Problem: DISCHARGE PLANNING  Goal: Discharge to home or other facility with appropriate resources  Description: INTERVENTIONS:  - Identify barriers to discharge w/patient and caregiver  - Arrange for needed discharge resources and transportation as appropriate  - Identify discharge learning needs (meds, wound care, etc )  - Arrange for interpretive services to assist at discharge as needed  - Refer to Case Management Department for coordinating discharge planning if the patient needs post-hospital services based on physician/advanced practitioner order or complex needs related to functional status, cognitive ability, or social support system  Outcome: Progressing     Problem: Knowledge Deficit  Goal: Patient/family/caregiver demonstrates understanding of disease process, treatment plan, medications, and discharge instructions  Description: Complete learning assessment and assess knowledge base    Interventions:  - Provide teaching at level of understanding  - Provide teaching via preferred learning methods  Outcome: Progressing     Problem: GASTROINTESTINAL - ADULT  Goal: Minimal or absence of nausea and/or vomiting  Description: INTERVENTIONS:  - Administer IV fluids if ordered to ensure adequate hydration  - Maintain NPO status until nausea and vomiting are resolved  - Nasogastric tube if ordered  - Administer ordered antiemetic medications as needed  - Provide nonpharmacologic comfort measures as appropriate  - Advance diet as tolerated, if ordered  - Consider nutrition services referral to assist patient with adequate nutrition and appropriate food choices  Outcome: Progressing  Goal: Maintains or returns to baseline bowel function  Description: INTERVENTIONS:  - Assess bowel function  - Encourage oral fluids to ensure adequate hydration  - Administer IV fluids if ordered to ensure adequate hydration  - Administer ordered medications as needed  - Encourage mobilization and activity  - Consider nutritional services referral to assist patient with adequate nutrition and appropriate food choices  Outcome: Progressing  Goal: Maintains adequate nutritional intake  Description: INTERVENTIONS:  - Monitor percentage of each meal consumed  - Identify factors contributing to decreased intake, treat as appropriate  - Assist with meals as needed  - Monitor I&O, weight, and lab values if indicated  - Obtain nutrition services referral as needed  Outcome: Progressing     Problem: METABOLIC, FLUID AND ELECTROLYTES - ADULT  Goal: Electrolytes maintained within normal limits  Description: INTERVENTIONS:  - Monitor labs and assess patient for signs and symptoms of electrolyte imbalances  - Administer electrolyte replacement as ordered  - Monitor response to electrolyte replacements, including repeat lab results as appropriate  - Instruct patient on fluid and nutrition as appropriate  Outcome: Progressing  Goal: Fluid balance maintained  Description: INTERVENTIONS:  - Monitor labs   - Monitor I/O and WT  - Instruct patient on fluid and nutrition as appropriate  - Assess for signs & symptoms of volume excess or deficit  Outcome: Progressing

## 2020-09-17 NOTE — ED PROVIDER NOTES
History  Chief Complaint   Patient presents with    Abnormal Lab     pt instructed to come to ED per further evaluation abnormal potassium test done this morning  The patient is a 14-year-old female with a past medical history of gastric bypass who presents emergency department today due to abnormal lab results  Patient states over last 2 days she has had decreased p o  Intake  Patient states that she feels nauseated every time she attempts to eat  Patient states that she has seen GI and obtained a colonoscopy where everything was found to be normal   Patient has also been evaluated by her gastric bypass surgeon who had performed a exploratory laparoscopy in the beginning of this month  Surgeon was unable to find any reason for her abdominal pain with eating  Patient is followed by her nephrologist for her hypokalemia, however despite outpatient potassium supplements she was referred to the emergency department today for worsening hypokalemia  Patient denies any chest pain, shortness of breath, fevers or chills, nausea or vomiting  Medical Problem   Location:  Abnormal test results  Quality:  Patient was found to have low potassium today on lab work  Timing:  Constant  Progression:  Unchanged  Chronicity:  Recurrent  Context:  Patient was found to have low potassium on lab work today referred to the ER  Relieved by:  Nothing  Worsened by:  Nothing  Ineffective treatments:  Potassium supplements  Associated symptoms: no abdominal pain, no chest pain, no congestion, no cough, no diarrhea, no ear pain, no fatigue, no fever, no headaches, no loss of consciousness, no myalgias, no nausea, no rash, no rhinorrhea, no shortness of breath, no sore throat, no vomiting and no wheezing        Prior to Admission Medications   Prescriptions Last Dose Informant Patient Reported? Taking?    AMILoride 5 mg tablet 9/16/2020 at Unknown time Self No Yes   Sig: Take 1 tablet (5 mg total) by mouth daily   Catheters MISC No Yes   Sig: by Does not apply route 2 (two) times a week Port care per [facility], Disp, (SYRINGE 3CC/25GX5/8") 25G X 5/8" 3 ML MISC Past Month at Unknown time  No Yes   Sig: Use once monthly for B12 injection     acetaminophen (TYLENOL) 325 mg tablet Not Taking at Unknown time  No No   Sig: Take 2 tablets (650 mg total) by mouth every 6 (six) hours as needed for mild pain   Patient not taking: Reported on 9/16/2020   amitriptyline (ELAVIL) 50 mg tablet 9/15/2020 at Unknown time  Yes Yes   Sig: Take 50 mg by mouth daily at bedtime   cyanocobalamin 1,000 mcg/mL Past Month at Unknown time  No Yes   Sig: Inject 1 mL (1,000 mcg total) into a muscle every 30 (thirty) days   cyclobenzaprine (FLEXERIL) 10 mg tablet 9/16/2020 at 1500  Yes Yes   Sig: Take 10 mg by mouth 3 (three) times a day   ergocalciferol (ERGOCALCIFEROL) 1 25 MG (31523 UT) capsule Past Week at Unknown time  No Yes   Sig: Take 1 capsule (50,000 Units total) by mouth 2 (two) times a week Patient takes this med on Mondays   Patient taking differently: Take 50,000 Units by mouth once a week Mondays and Thursdays   ferrous sulfate 325 (65 Fe) mg tablet 9/16/2020 at Unknown time  Yes Yes   Sig: Take 325 mg by mouth every other day Last took 3/9/20   ondansetron (ZOFRAN) 4 mg tablet 9/16/2020 at 1500  Yes Yes   Sig: Take 4 mg by mouth every 8 (eight) hours   other medication, see sig, 9/15/2020 at Unknown time  No Yes   Sig: Infuse 80 mEq into a venous catheter 4 (four) times a week *Potassium infusions 2x week   pantoprazole (PROTONIX) 40 mg tablet Not Taking at Unknown time  No No   Sig: Take 1 tablet (40 mg total) by mouth daily   Patient not taking: Reported on 9/16/2020   polyethylene glycol (MIRALAX) 17 g packet 9/15/2020 at Unknown time  No Yes   Sig: Take 17 g by mouth daily   potassium chloride 0 4 mEq/mL 9/16/2020 at Unknown time  No Yes   Sig: Infuse 100 mL (40 mEq total) into a venous catheter 3 (three) times a week Every tue,thurs,   potassium chloride 10% oral solution 2020 at Unknown time  No Yes   Sig: Take 45 mL (60 mEq total) by mouth 6 (six) times a day   sucralfate (CARAFATE) 1 g tablet Not Taking at Unknown time  No No   Sig: Take 1 tablet (1 g total) by mouth daily   Patient not taking: Reported on 2020   topiramate (TOPAMAX) 25 mg tablet 2020 at Unknown time  No Yes   Sig: Take 1 tablet (25 mg total) by mouth 2 (two) times a day      Facility-Administered Medications: None       Past Medical History:   Diagnosis Date    H  pylori infection     Hypokalemia     Migraine     Renal disorder     Rhabdomyolysis        Past Surgical History:   Procedure Laterality Date    ABDOMINAL SURGERY      APPENDECTOMY       SECTION      CHOLECYSTECTOMY      COSMETIC SURGERY      "tummy tuck"    FL GUIDED CENTRAL VENOUS ACCESS DEVICE INSERTION  2018    GASTRIC BYPASS      HYSTERECTOMY      LAPAROSCOPY      TUNNELED VENOUS PORT PLACEMENT N/A 2018    Procedure: INSERTION VENOUS PORT (PORT-A-CATH); Surgeon: Elfego Sosa DO;  Location: [facility];  Service: General       Family History   Problem Relation Age of Onset    No Known Problems Mother     Hypertension Father     Diabetes Father     Diabetes Maternal Grandfather      I have reviewed and agree with the history as documented  E-Cigarette/Vaping    E-Cigarette Use Never User      E-Cigarette/Vaping Substances    Nicotine No     THC No     CBD No      Social History     Tobacco Use    Smoking status: Never Smoker    Smokeless tobacco: Never Used   Substance Use Topics    Alcohol use: Never     Alcohol/week: 0 0 standard drinks     Frequency: Never     Drinks per session: Patient refused     Binge frequency: Never     Comment: 0    Drug use: Never       Review of Systems   Constitutional: Negative for fatigue and fever  HENT: Negative for congestion, ear pain, rhinorrhea and sore throat      Respiratory: Negative for cough, shortness of breath and wheezing  Cardiovascular: Negative for chest pain  Gastrointestinal: Negative for abdominal pain, diarrhea, nausea and vomiting  Musculoskeletal: Negative for myalgias  Skin: Negative for rash  Neurological: Negative for loss of consciousness and headaches  All other systems reviewed and are negative  Physical Exam  Physical Exam  Vitals signs and nursing note reviewed  Constitutional:       General: She is not in acute distress  Appearance: She is well-developed  HENT:      Head: Normocephalic and atraumatic  Right Ear: External ear normal       Left Ear: External ear normal       Mouth/Throat:      Mouth: Mucous membranes are moist    Eyes:      Pupils: Pupils are equal, round, and reactive to light  Neck:      Musculoskeletal: Normal range of motion and neck supple  Cardiovascular:      Rate and Rhythm: Normal rate and regular rhythm  Pulses: Normal pulses  Heart sounds: No murmur  Pulmonary:      Effort: Pulmonary effort is normal  No respiratory distress  Breath sounds: Normal breath sounds  No wheezing  Abdominal:      General: Bowel sounds are normal       Palpations: Abdomen is soft  There is no mass  Tenderness: There is no abdominal tenderness  There is no rebound  Hernia: No hernia is present  Musculoskeletal:      Right lower leg: No edema  Left lower leg: No edema  Skin:     General: Skin is warm and dry  Capillary Refill: Capillary refill takes less than 2 seconds  Coloration: Skin is not pale  Neurological:      General: No focal deficit present  Mental Status: She is alert and oriented to person, place, and time        Coordination: Coordination normal    Psychiatric:         Behavior: Behavior normal          Vital Signs  ED Triage Vitals   Temperature Pulse Respirations Blood Pressure SpO2   09/16/20 1804 09/16/20 1804 09/16/20 1804 09/16/20 1804 09/16/20 1804   98 2 °F (36 8 °C) 92 20 124/65 98 %      Temp Source Heart Rate Source Patient Position - Orthostatic VS BP Location FiO2 (%)   09/16/20 1804 09/16/20 1804 09/16/20 1804 09/16/20 1804 --   Temporal Monitor Sitting Left arm       Pain Score       09/16/20 1811       5           Vitals:    09/16/20 2019 09/17/20 0038 09/17/20 0743 09/17/20 1120   BP: 96/55 97/56 101/59 100/66   Pulse: 84 71 83 85   Patient Position - Orthostatic VS:             Visual Acuity      ED Medications  Medications   topiramate (TOPAMAX) tablet 25 mg (25 mg Oral Given 9/17/20 0821)   potassium chloride oral solution 60 mEq (60 mEq Oral Given 9/17/20 1249)   polyethylene glycol (MIRALAX) packet 17 g (17 g Oral Given 9/17/20 0821)   ferrous sulfate tablet 325 mg (325 mg Oral Given 9/16/20 2114)   cyclobenzaprine (FLEXERIL) tablet 10 mg (10 mg Oral Given 9/17/20 0821)   amitriptyline (ELAVIL) tablet 50 mg (50 mg Oral Given 9/16/20 2114)   AMILoride tablet 5 mg (5 mg Oral Given 9/17/20 0825)   sodium chloride 0 9 % infusion (75 mL/hr Intravenous New Bag 9/17/20 1126)   ondansetron (ZOFRAN) injection 4 mg (4 mg Intravenous Not Given 9/17/20 1219)   acetaminophen (TYLENOL) tablet 650 mg (has no administration in time range)   oxyCODONE (ROXICODONE) IR tablet 5 mg (has no administration in time range)   HYDROmorphone (DILAUDID) injection 0 5 mg (0 5 mg Intravenous Given 9/17/20 1215)   sodium chloride 0 9 % bolus 1,000 mL (0 mL Intravenous Stopped 9/16/20 2110)   potassium chloride (K-DUR,KLOR-CON) CR tablet 40 mEq (40 mEq Oral Given 9/16/20 1935)   potassium chloride 20 mEq IVPB (premix) (0 mEq Intravenous Stopped 9/17/20 0315)   potassium chloride 20 mEq IVPB (premix) (20 mEq Intravenous New Bag 9/17/20 0441)       Diagnostic Studies  Results Reviewed     Procedure Component Value Units Date/Time    Comprehensive metabolic panel [[id number]]  (Abnormal) Collected:  09/16/20 1904    Lab Status:  Final result Specimen:  Blood from Arm, Right Updated:  09/16/20 1926 Sodium 142 mmol/L      Potassium 2 4 mmol/L      Chloride 108 mmol/L      CO2 19 mmol/L      ANION GAP 15 mmol/L      BUN 11 mg/dL      Creatinine 0 99 mg/dL      Glucose 78 mg/dL      Calcium 8 2 mg/dL      Corrected Calcium 8 8 mg/dL      AST 20 U/L      ALT 29 U/L      Alkaline Phosphatase 55 U/L      Total Protein 6 5 g/dL      Albumin 3 3 g/dL      Total Bilirubin 0 19 mg/dL      eGFR 74 ml/min/1 73sq m     Narrative:       [place] guidelines for Chronic Kidney Disease (CKD):     Stage 1 with normal or high GFR (GFR > 90 mL/min/1 73 square meters)    Stage 2 Mild CKD (GFR = 60-89 mL/min/1 73 square meters)    Stage 3A Moderate CKD (GFR = 45-59 mL/min/1 73 square meters)    Stage 3B Moderate CKD (GFR = 30-44 mL/min/1 73 square meters)    Stage 4 Severe CKD (GFR = 15-29 mL/min/1 73 square meters)    Stage 5 End Stage CKD (GFR <15 mL/min/1 73 square meters)  Note: GFR calculation is accurate only with a steady state creatinine    Magnesium [[id number]]  (Normal) Collected:  09/16/20 1904    Lab Status:  Final result Specimen:  Blood from Arm, Right Updated:  09/16/20 1925     Magnesium 1 9 mg/dL     Lactic acid [[id number]]  (Normal) Collected:  09/16/20 1832    Lab Status:  Final result Specimen:  Blood from Arm, Right Updated:  09/16/20 1904     LACTIC ACID 0 5 mmol/L     Narrative:       Result may be elevated if tourniquet was used during collection      Urine Microscopic [[id number]]  (Abnormal) Collected:  09/16/20 1832    Lab Status:  Final result Specimen:  Urine, Clean Catch Updated:  09/16/20 1856     RBC, UA None Seen /hpf      WBC, UA None Seen /hpf      Epithelial Cells Moderate /hpf      Bacteria, UA None Seen /hpf     UA w Reflex to Microscopic w Reflex to Culture [[id number]]  (Abnormal) Collected:  09/16/20 1832    Lab Status:  Final result Specimen:  Urine, Clean Catch Updated:  09/16/20 1849     Color, UA Yellow     Clarity, UA Slightly Cloudy     Specific Gravity, UA >=1 030     pH, UA 6 0     Leukocytes, UA Negative     Nitrite, UA Negative     Protein, UA 30 (1+) mg/dl      Glucose, UA Negative mg/dl      Ketones, UA 15 (1+) mg/dl      Urobilinogen, UA 0 2 E U /dl      Bilirubin, UA Small     Blood, UA Negative    CBC and differential [[id number]]  (Abnormal) Collected:  09/16/20 1832    Lab Status:  Final result Specimen:  Blood from Arm, Right Updated:  09/16/20 1840     WBC 5 98 Thousand/uL      RBC 3 76 Million/uL      Hemoglobin 10 4 g/dL      Hematocrit 32 5 %      MCV 86 fL      MCH 27 7 pg      MCHC 32 0 g/dL      RDW 18 1 %      MPV 10 7 fL      Platelets 789 Thousands/uL      nRBC 0 /100 WBCs      Neutrophils Relative 53 %      Immat GRANS % 0 %      Lymphocytes Relative 36 %      Monocytes Relative 8 %      Eosinophils Relative 2 %      Basophils Relative 1 %      Neutrophils Absolute 3 18 Thousands/µL      Immature Grans Absolute 0 01 Thousand/uL      Lymphocytes Absolute 2 15 Thousands/µL      Monocytes Absolute 0 46 Thousand/µL      Eosinophils Absolute 0 13 Thousand/µL      Basophils Absolute 0 05 Thousands/µL                  No orders to display              Procedures  ECG 12 Lead Documentation Only    Date/Time: 9/17/2020 12:52 PM  Performed by: Dea Ochoa PA-C  Authorized by: Dea Ochoa PA-C     Indications / Diagnosis:  Hypokalmeia   ECG reviewed by me, the ED Provider: yes    Patient location:  ED  Previous ECG:     Previous ECG:  Compared to current    Comparison ECG info:   When compared to August 17th, today illustrates more prominent inferior lateral T wave inversions consistent with hypokalemia    Similarity:  Changes noted    Comparison to cardiac monitor: Yes    Interpretation:     Interpretation: non-specific    Rate:     ECG rate:  83    ECG rate assessment: normal    Rhythm:     Rhythm: sinus rhythm    ST segments:     ST segments:  Normal  T waves:     T waves: inverted               ED Course  ED Course as of Sep 17 1255   Wed Sep 16, 2020 1925 Patient has high specific gravity UA sample so we will give IVF                              SBIRT 22yo+      Most Recent Value   SBIRT (24 yo +)   In order to provide better care to our patients, we are screening all of our patients for alcohol and drug use  Would it be okay to ask you these screening questions? Yes Filed at: 09/16/2020 1837   Initial Alcohol Screen: US AUDIT-C    1  How often do you have a drink containing alcohol?  0 Filed at: 09/16/2020 1837   2  How many drinks containing alcohol do you have on a typical day you are drinking? 0 Filed at: 09/16/2020 1837   3a  Male UNDER 65: How often do you have five or more drinks on one occasion? 0 Filed at: 09/16/2020 1837   3b  FEMALE Any Age, or MALE 65+: How often do you have 4 or more drinks on one occassion? 0 Filed at: 09/16/2020 1837   Audit-C Score  0 Filed at: 09/16/2020 1837   SLIME: How many times in the past year have you    Used an illegal drug or used a prescription medication for non-medical reasons? Never Filed at: 09/16/2020 1837                  MDM  Number of Diagnoses or Management Options  Hypokalemia:   Diagnosis management comments: Patient was found to have hypokalemia upon laboratory findings today  Noted to have EKG changes  Patient was given IV potassium as well as p o  Potassium in the emergency department  Discussed case with Dr Arnulfo Ahumada for admission  Patient in agreement with treatment plan         Amount and/or Complexity of Data Reviewed  Clinical lab tests: ordered and reviewed  Tests in the radiology section of CPT®: ordered and reviewed  Decide to obtain previous medical records or to obtain history from someone other than the patient: yes  Review and summarize past medical records: yes  Discuss the patient with other providers: yes  Independent visualization of images, tracings, or specimens: yes    Risk of Complications, Morbidity, and/or Mortality  Presenting problems: moderate  Diagnostic procedures: moderate  Management options: moderate    Patient Progress  Patient progress: stable      Disposition  Final diagnoses:   Hypokalemia     Time reflects when diagnosis was documented in both MDM as applicable and the Disposition within this note     Time User Action Codes Description Comment    9/16/2020  6:20 PM Tempie Elijah Add [Z51 89] Wound check, abscess     9/16/2020  6:20 PM Tempie Elijah Remove [Z51 89] Wound check, abscess     9/16/2020  6:20 PM Tempie Elijah Add [Z51 89] Visit for wound check     9/16/2020  7:50 PM Tempie Elijah Remove [Z51 89] Visit for wound check     9/16/2020  7:50 PM Tempie Elijah Add [E87 6] Hypokalemia       ED Disposition     ED Disposition Condition Date/Time Comment    Admit Stable Wed Sep 16, 2020  7:51 PM Case was discussed with cesar and the patient's admission status was agreed to be Admission Status: observation status to the service of Dr Mani Vargas           Follow-up Information    None         Current Discharge Medication List      CONTINUE these medications which have NOT CHANGED    Details   AMILoride 5 mg tablet Take 1 tablet (5 mg total) by mouth daily  Qty: 30 tablet, Refills: 0    Associated Diagnoses: Hypokalemia      amitriptyline (ELAVIL) 50 mg tablet Take 50 mg by mouth daily at bedtime      Catheters MISC by Does not apply route 2 (two) times a week Port care per [facility] [employer] protocol  Qty: 999 each, Refills: 11    Associated Diagnoses: Hypokalemia      cyanocobalamin 1,000 mcg/mL Inject 1 mL (1,000 mcg total) into a muscle every 30 (thirty) days  Qty: 1 mL, Refills: 12    Associated Diagnoses: B12 deficiency      cyclobenzaprine (FLEXERIL) 10 mg tablet Take 10 mg by mouth 3 (three) times a day      ergocalciferol (ERGOCALCIFEROL) 1 25 MG (38695 UT) capsule Take 1 capsule (50,000 Units total) by mouth 2 (two) times a week Patient takes this med on Mondays  Qty: 60 capsule, Refills: 5    Associated Diagnoses: Vitamin D insufficiency      ferrous sulfate 325 (65 Fe) mg tablet Take 325 mg by mouth every other day Last took 3/9/20      ondansetron (ZOFRAN) 4 mg tablet Take 4 mg by mouth every 8 (eight) hours      other medication, see sig, Infuse 80 mEq into a venous catheter 4 (four) times a week *Potassium infusions 2x week  Refills: 0    Associated Diagnoses: Hypokalemia      polyethylene glycol (MIRALAX) 17 g packet Take 17 g by mouth daily  Qty: 14 each, Refills: 0    Associated Diagnoses: Left lower quadrant abdominal pain      potassium chloride 0 4 mEq/mL Infuse 100 mL (40 mEq total) into a venous catheter 3 (three) times a week Every tue,thurs,sunday  Qty: 1200 mL, Refills: 0    Associated Diagnoses: Hypokalemia      potassium chloride 10% oral solution Take 45 mL (60 mEq total) by mouth 6 (six) times a day  Qty: 8100 mL, Refills: 0    Associated Diagnoses: Hypokalemia      Syringe/Needle, Disp, (SYRINGE 3CC/25GX5/8") 25G X 5/8" 3 ML MISC Use once monthly for B12 injection  Qty: 1 each, Refills: 11    Associated Diagnoses: B12 deficiency      topiramate (TOPAMAX) 25 mg tablet Take 1 tablet (25 mg total) by mouth 2 (two) times a day  Qty: 60 tablet, Refills: 0    Associated Diagnoses: Migraine without aura and without status migrainosus, not intractable      acetaminophen (TYLENOL) 325 mg tablet Take 2 tablets (650 mg total) by mouth every 6 (six) hours as needed for mild pain  Qty: 30 tablet, Refills: 0    Associated Diagnoses: Left lower quadrant abdominal pain      pantoprazole (PROTONIX) 40 mg tablet Take 1 tablet (40 mg total) by mouth daily  Qty: 90 tablet, Refills: 1    Associated Diagnoses: Gastroesophageal reflux disease without esophagitis      sucralfate (CARAFATE) 1 g tablet Take 1 tablet (1 g total) by mouth daily  Qty: 90 tablet, Refills: 1    Associated Diagnoses: Gastroesophageal reflux disease without esophagitis           No discharge procedures on file      PDMP Review     None          ED Provider  Electronically Signed by           Analia Fabian Philipp Simon PA-C  09/17/20 1252

## 2020-09-17 NOTE — PROGRESS NOTES
Progress Note - Divya Peterson 1985, 38 y o  female MRN: [id number]    Unit/Bed#: -01 Encounter: [id number]    Primary Care Provider: Tian Cobos DO   Date and time admitted to hospital: 9/16/2020  6:01 PM        Chronic anemia  Assessment & Plan  · Hemoglobin 10 4, was 13 5 in August 2020  · Daily CBC and trend hemoglobin  · Continue iron    Migraine without aura and without status migrainosus, not intractable  Assessment & Plan  · Currently controlled  · Continue Topamax and Elavil    History of gastric bypass  Assessment & Plan  · Chronic malabsorption with acute on chronic hypokalemia  · Had exploratory laparoscopy 09/02/2020-unable to find results in Care everywhere  · Continue outpatient follow-up with bariatric surgery    * Hypokalemia  Assessment & Plan  · Recurrent severe hypokalemia thought to be due gastric bypass; persistence due to poor po intake  · Poor po intake 2/2 persistent severe left lower quadrant abdominal pain  · Follows outpatient with Nephrology  · Magnesium level is 1 9  · Received 40 mEq orally and 40 mEq IV in ER, with repeat potassium 2 9  · Will give another 40 mEq IV  · Recheck metabolic panel in the morning, trend potassium, replete as needed              VTE Pharmacologic Prophylaxis:   Pharmacologic: Enoxaparin (Lovenox)  Mechanical VTE Prophylaxis in Place: No    Patient Centered Rounds: I have performed bedside rounds with nursing staff today  Discussions with Specialists or Other Care Team Provider: none    Education and Discussions with Family / Patient: with patient    Time Spent for Care: 30 minutes  More than 50% of total time spent on counseling and coordination of care as described above      Current Length of Stay: 0 day(s)    Current Patient Status: Inpatient   Certification Statement: The patient will continue to require additional inpatient hospital stay due to inability to tolerate po and persistent hypokalemia due to this    Discharge Plan: will discharge once acute condition resolved    Code Status: Level 1 - Full Code      Subjective:   Patient seen and examined at bedside  Insulin able to tolerate p o  Patient complains of severe left lower quadrant pain  Patient states that she is able to take broth soup and Jell-O  Patient wishes to attempt to eat a full diet  States that she will try to take his diet after pain meds  Patient denies any other complaints at this time  Objective:     Vitals:   Temp (24hrs), Av 9 °F (36 6 °C), Min:97 4 °F (36 3 °C), Max:98 2 °F (36 8 °C)    Temp:  [97 4 °F (36 3 °C)-98 2 °F (36 8 °C)] 97 8 °F (36 6 °C)  HR:  [71-92] 77  Resp:  [14-22] 14  BP: ()/(55-80) 106/65  SpO2:  [98 %-100 %] 100 %  Body mass index is 25 33 kg/m²  Input and Output Summary (last 24 hours): Intake/Output Summary (Last 24 hours) at 2020 1642  Last data filed at 2020 1126  Gross per 24 hour   Intake 1341 25 ml   Output    Net 1341 25 ml       Physical Exam:     Physical Exam  Constitutional:       General: She is in acute distress  Appearance: Normal appearance  She is obese  HENT:      Head: Normocephalic and atraumatic  Nose: Nose normal       Mouth/Throat:      Mouth: Mucous membranes are dry  Eyes:      Extraocular Movements: Extraocular movements intact  Pupils: Pupils are equal, round, and reactive to light  Neck:      Musculoskeletal: Normal range of motion and neck supple  Cardiovascular:      Rate and Rhythm: Normal rate and regular rhythm  Pulses: Normal pulses  Heart sounds: Normal heart sounds  Pulmonary:      Effort: Pulmonary effort is normal       Breath sounds: Normal breath sounds  Abdominal:      General: Bowel sounds are normal       Palpations: Abdomen is soft  Tenderness: There is abdominal tenderness  There is no right CVA tenderness  Musculoskeletal:         General: No swelling or tenderness  Right lower leg: No edema        Left lower leg: No edema  Skin:     General: Skin is warm and dry  Neurological:      General: No focal deficit present  Mental Status: She is alert and oriented to person, place, and time  Psychiatric:         Mood and Affect: Mood normal          Behavior: Behavior normal        Additional Data:     Labs:    Results from last 7 days   Lab Units 09/17/20  0845   WBC Thousand/uL 5 05   HEMOGLOBIN g/dL 10 4*   HEMATOCRIT % 32 7*   PLATELETS Thousands/uL 264   NEUTROS PCT % 54   LYMPHS PCT % 35   MONOS PCT % 8   EOS PCT % 2     Results from last 7 days   Lab Units 09/17/20  0845  09/16/20  1904   SODIUM mmol/L 139   < > 142   POTASSIUM mmol/L 3 7   < > 2 4*   CHLORIDE mmol/L 111*   < > 108   CO2 mmol/L 19*   < > 19*   BUN mg/dL 11   < > 11   CREATININE mg/dL 0 93   < > 0 99   ANION GAP mmol/L 9   < > 15*   CALCIUM mg/dL 7 8*   < > 8 2*   ALBUMIN g/dL  --   --  3 3*   TOTAL BILIRUBIN mg/dL  --   --  0 19*   ALK PHOS U/L  --   --  55   ALT U/L  --   --  29   AST U/L  --   --  20   GLUCOSE RANDOM mg/dL 79   < > 78    < > = values in this interval not displayed  Results from last 7 days   Lab Units 09/16/20  1832   LACTIC ACID mmol/L 0 5           * I Have Reviewed All Lab Data Listed Above  * Additional Pertinent Lab Tests Reviewed:  Veto 66 Admission Reviewed      Last 24 Hours Medication List:   Current Facility-Administered Medications   Medication Dose Route Frequency Provider Last Rate    acetaminophen  650 mg Oral Q6H PRN ARIADNA Lord      AMILoride  5 mg Oral Daily ARIADNA Lord      amitriptyline  50 mg Oral HS ARIADNA Lord      cyclobenzaprine  10 mg Oral TID ARIADNA Lord      [START ON 9/18/2020] enoxaparin  40 mg Subcutaneous Q24H [facility] Elaina Cr MD      ferrous sulfate  325 mg Oral Every Other Day ARIADNA Lord      HYDROmorphone  0 5 mg Intravenous Q4H PRN ARIADNA Lord      ondansetron 4 mg Intravenous Q6H PRN Zaria Decree, CRNP      oxyCODONE  5 mg Oral Q4H PRN Zaria Decree, CRNP      polyethylene glycol  17 g Oral Daily Zaria Decree, CRNP      potassium chloride  60 mEq Oral 6x Daily Zaria Decree, CRNP      sodium chloride  75 mL/hr Intravenous Continuous Zaria Decree, CRNP 75 mL/hr (09/17/20 1126)    topiramate  25 mg Oral BID Zaria Decree, CRNP          Today, Patient Was Seen By: Sara Bell MD    ** Please Note: Dictation voice to text software may have been used in the creation of this document   **

## 2020-09-17 NOTE — ASSESSMENT & PLAN NOTE
· Chronic malabsorption with acute on chronic hypokalemia  · Had exploratory laparoscopy 09/02/2020-unable to find results in Care everywhere  · Continue outpatient follow-up with bariatric surgery

## 2020-09-17 NOTE — PLAN OF CARE
Problem: Potential for Falls  Goal: Patient will remain free of falls  Description: INTERVENTIONS:  - Assess patient frequently for physical needs  -  Identify cognitive and physical deficits and behaviors that affect risk of falls  -  [place] fall precautions as indicated by assessment   - Educate patient/family on patient safety including physical limitations  - Instruct patient to call for assistance with activity based on assessment  - Modify environment to reduce risk of injury  - Consider OT/PT consult to assist with strengthening/mobility  Outcome: Progressing     Problem: Nutrition/Hydration-ADULT  Goal: Nutrient/Hydration intake appropriate for improving, restoring or maintaining nutritional needs  Description: Monitor and assess patient's nutrition/hydration status for malnutrition  Collaborate with interdisciplinary team and initiate plan and interventions as ordered  Monitor patient's weight and dietary intake as ordered or per policy  Utilize nutrition screening tool and intervene as necessary  Determine patient's food preferences and provide high-protein, high-caloric foods as appropriate       INTERVENTIONS:  - Monitor oral intake, urinary output, labs, and treatment plans  - Assess nutrition and hydration status and recommend course of action  - Evaluate amount of meals eaten  - Assist patient with eating if necessary   - Allow adequate time for meals  - Recommend/ encourage appropriate diets, oral nutritional supplements, and vitamin/mineral supplements  - Order, calculate, and assess calorie counts as needed  - Recommend, monitor, and adjust tube feedings and TPN/PPN based on assessed needs  - Assess need for intravenous fluids  - Provide specific nutrition/hydration education as appropriate  - Include patient/family/caregiver in decisions related to nutrition  Outcome: Progressing     Problem: PAIN - ADULT  Goal: Verbalizes/displays adequate comfort level or baseline comfort level  Description: Interventions:  - Encourage patient to monitor pain and request assistance  - Assess pain using appropriate pain scale  - Administer analgesics based on type and severity of pain and evaluate response  - Implement non-pharmacological measures as appropriate and evaluate response  - Consider cultural and social influences on pain and pain management  - Notify physician/advanced practitioner if interventions unsuccessful or patient reports new pain  Outcome: Progressing     Problem: INFECTION - ADULT  Goal: Absence or prevention of progression during hospitalization  Description: INTERVENTIONS:  - Assess and monitor for signs and symptoms of infection  - Monitor lab/diagnostic results  - Monitor all insertion sites, i e  indwelling lines, tubes, and drains  - Monitor endotracheal if appropriate and nasal secretions for changes in amount and color  - [place] appropriate cooling/warming therapies per order  - Administer medications as ordered  - Instruct and encourage patient and family to use good hand hygiene technique  - Identify and instruct in appropriate isolation precautions for identified infection/condition  Outcome: Progressing  Goal: Absence of fever/infection during neutropenic period  Description: INTERVENTIONS:  - Monitor WBC    Outcome: Progressing     Problem: SAFETY ADULT  Goal: Patient will remain free of falls  Description: INTERVENTIONS:  - Assess patient frequently for physical needs  -  Identify cognitive and physical deficits and behaviors that affect risk of falls    -  [place] fall precautions as indicated by assessment   - Educate patient/family on patient safety including physical limitations  - Instruct patient to call for assistance with activity based on assessment  - Modify environment to reduce risk of injury  - Consider OT/PT consult to assist with strengthening/mobility  Outcome: Progressing  Goal: Maintain or return to baseline ADL function  Description: INTERVENTIONS:  -  Assess patient's ability to carry out ADLs; assess patient's baseline for ADL function and identify physical deficits which impact ability to perform ADLs (bathing, care of mouth/teeth, toileting, grooming, dressing, etc )  - Assess/evaluate cause of self-care deficits   - Assess range of motion  - Assess patient's mobility; develop plan if impaired  - Assess patient's need for assistive devices and provide as appropriate  - Encourage maximum independence but intervene and supervise when necessary  - Involve family in performance of ADLs  - Assess for home care needs following discharge   - Consider OT consult to assist with ADL evaluation and planning for discharge  - Provide patient education as appropriate  Outcome: Progressing  Goal: Maintain or return mobility status to optimal level  Description: INTERVENTIONS:  - Assess patient's baseline mobility status (ambulation, transfers, stairs, etc )    - Identify cognitive and physical deficits and behaviors that affect mobility  - Identify mobility aids required to assist with transfers and/or ambulation (gait belt, sit-to-stand, lift, walker, cane, etc )  - [place] fall precautions as indicated by assessment  - Record patient progress and toleration of activity level on Mobility SBAR; progress patient to next Phase/Stage  - Instruct patient to call for assistance with activity based on assessment  - Consider rehabilitation consult to assist with strengthening/weightbearing, etc   Outcome: Progressing     Problem: DISCHARGE PLANNING  Goal: Discharge to home or other facility with appropriate resources  Description: INTERVENTIONS:  - Identify barriers to discharge w/patient and caregiver  - Arrange for needed discharge resources and transportation as appropriate  - Identify discharge learning needs (meds, wound care, etc )  - Arrange for interpretive services to assist at discharge as needed  - Refer to Case Management Department for coordinating discharge planning if the patient needs post-hospital services based on physician/advanced practitioner order or complex needs related to functional status, cognitive ability, or social support system  Outcome: Progressing     Problem: Knowledge Deficit  Goal: Patient/family/caregiver demonstrates understanding of disease process, treatment plan, medications, and discharge instructions  Description: Complete learning assessment and assess knowledge base    Interventions:  - Provide teaching at level of understanding  - Provide teaching via preferred learning methods  Outcome: Progressing     Problem: GASTROINTESTINAL - ADULT  Goal: Minimal or absence of nausea and/or vomiting  Description: INTERVENTIONS:  - Administer IV fluids if ordered to ensure adequate hydration  - Maintain NPO status until nausea and vomiting are resolved  - Nasogastric tube if ordered  - Administer ordered antiemetic medications as needed  - Provide nonpharmacologic comfort measures as appropriate  - Advance diet as tolerated, if ordered  - Consider nutrition services referral to assist patient with adequate nutrition and appropriate food choices  Outcome: Progressing  Goal: Maintains or returns to baseline bowel function  Description: INTERVENTIONS:  - Assess bowel function  - Encourage oral fluids to ensure adequate hydration  - Administer IV fluids if ordered to ensure adequate hydration  - Administer ordered medications as needed  - Encourage mobilization and activity  - Consider nutritional services referral to assist patient with adequate nutrition and appropriate food choices  Outcome: Progressing  Goal: Maintains adequate nutritional intake  Description: INTERVENTIONS:  - Monitor percentage of each meal consumed  - Identify factors contributing to decreased intake, treat as appropriate  - Assist with meals as needed  - Monitor I&O, weight, and lab values if indicated  - Obtain nutrition services referral as needed  Outcome: Progressing     Problem: METABOLIC, FLUID AND ELECTROLYTES - ADULT  Goal: Electrolytes maintained within normal limits  Description: INTERVENTIONS:  - Monitor labs and assess patient for signs and symptoms of electrolyte imbalances  - Administer electrolyte replacement as ordered  - Monitor response to electrolyte replacements, including repeat lab results as appropriate  - Instruct patient on fluid and nutrition as appropriate  Outcome: Progressing  Goal: Fluid balance maintained  Description: INTERVENTIONS:  - Monitor labs   - Monitor I/O and WT  - Instruct patient on fluid and nutrition as appropriate  - Assess for signs & symptoms of volume excess or deficit  Outcome: Progressing

## 2020-09-17 NOTE — ASSESSMENT & PLAN NOTE
· Recurrent severe hypokalemia thought to be due gastric bypass; persistence due to poor po intake  · Poor po intake 2/2 persistent severe left lower quadrant abdominal pain  · Follows outpatient with Nephrology  · Magnesium level is 1 9  · Received 40 mEq orally and 40 mEq IV in ER, with repeat potassium 2 9  · Will give another 40 mEq IV  · Recheck metabolic panel in the morning, trend potassium, replete as needed

## 2020-09-17 NOTE — H&P
[facility]    H&P- Peg Kimbrough 1985, 38 y o  female MRN: [id number]    Unit/Bed#: -01 Encounter: [id number]    Primary Care Provider: Kareem Antunez DO   Date and time admitted to hospital: 9/16/2020  6:01 PM        * Hypokalemia  Assessment & Plan  · Recurrent severe hypokalemia thought to be due to gastric bypass  · She said her potassium had been fine until she had stopped eating due to left abdominal pain  · Follows outpatient with Nephrology  · Magnesium level is 1 9  · Received 40 mEq orally and 40 mEq IV in ER, with repeat potassium 2 9  · Will give another 40 mEq IV  · Recheck metabolic panel in the morning, trend potassium, replete as needed            History of gastric bypass  Assessment & Plan  · Chronic malabsorption with acute on chronic hypokalemia  · Had exploratory laparoscopy 09/02/2020-unable to find results in Care everywhere  · Continue outpatient follow-up with bariatric surgery    Chronic anemia  Assessment & Plan  · Hemoglobin 10 4, was 13 5 in August 2020  · Daily CBC and trend hemoglobin  · Continue iron    Migraine without aura and without status migrainosus, not intractable  Assessment & Plan  · Currently controlled  · Continue Topamax and Elavil      VTE Prophylaxis: Pharmacologic VTE Prophylaxis contraindicated due to VTE score 1  / reason for no mechanical VTE prophylaxis VTE score 1   Code Status:  Full  POLST: POLST form is not discussed and not completed at this time  Discussion with family:  Patient    Anticipated Length of Stay:  Patient will be admitted on an Observation basis with an anticipated length of stay of  less than 2 midnights  Justification for Hospital Stay:  Per plan above    Total Time for Visit, including Counseling / Coordination of Care: 45 minutes  Greater than 50% of this total time spent on direct patient counseling and coordination of care      Chief Complaint:   Abnormal lab and abdominal pain    History of Present Illness:    Denita Oconnor is a 38 y o  female with history of gastric bypass, hyporkalemia, migraine, and H-pylori infection who presents with abnormal labs and abdominal pain  She has chronic hypokalemia, takes potassium supplementation 6 times a day at home, and was hospitalized a [address] recently for same  She follows with GI and weight management  She says that her potassium had been fine around 3 5-3 6, but she started having left-sided abdominal pain which she describes as sharp and worsened with eating, so she has really just been drinking broth and crystal light and her potassium has decreased  She also says she has had diarrhea because she has not been taking solid food  She also reports nausea and vomiting  She says that her weight went down to about 139 lb which is the lowest it has ever been, but is currently 143 lb, she thinks it is because she has been drinking so much bone broth  She denies fever chills, dysphagia, shortness of breath, hematemesis, chest pain, dysuria, back pain, or focal weakness  Review of Systems:    Review of Systems   Constitutional: Positive for appetite change and unexpected weight change  Negative for chills and fever  Respiratory: Negative for cough and shortness of breath  Cardiovascular: Negative for chest pain, palpitations and leg swelling  Gastrointestinal: Positive for abdominal pain, diarrhea, nausea and vomiting  Negative for abdominal distention and constipation  Genitourinary: Negative for dysuria and frequency  Musculoskeletal: Negative for arthralgias and myalgias  Neurological: Negative for dizziness, syncope, weakness and headaches  All other systems reviewed and are negative        Past Medical and Surgical History:     Past Medical History:   Diagnosis Date    H  pylori infection     Hypokalemia     Migraine     Renal disorder     Rhabdomyolysis        Past Surgical History:   Procedure Laterality Date    ABDOMINAL SURGERY      APPENDECTOMY       SECTION      CHOLECYSTECTOMY      COSMETIC SURGERY      "tummy tuck"    [place] GUIDED CENTRAL VENOUS ACCESS DEVICE INSERTION  2018    GASTRIC BYPASS      HYSTERECTOMY      LAPAROSCOPY      TUNNELED VENOUS PORT PLACEMENT N/A 2018    Procedure: INSERTION VENOUS PORT (PORT-A-CATH); Surgeon: Joel Linares DO;  Location: [facility];  Service: General       Meds/Allergies:    Prior to Admission medications    Medication Sig Start Date End Date Taking?  Authorizing Provider   AMILoride 5 mg tablet Take 1 tablet (5 mg total) by mouth daily 19  Yes Brooks Rouse DO   amitriptyline (ELAVIL) 50 mg tablet Take 50 mg by mouth daily at bedtime 18  Yes Historical Provider, MD   Catheters MISC by Does not apply route 2 (two) times a week Port care per [facility] protocol 20  Yes Ladonna Guadarrama DO   cyanocobalamin 1,000 mcg/mL Inject 1 mL (1,000 mcg total) into a muscle every 30 (thirty) days 20  Yes Ladonna Guadarrama DO   cyclobenzaprine (FLEXERIL) 10 mg tablet Take 10 mg by mouth 3 (three) times a day   Yes Historical Provider, MD   ergocalciferol (ERGOCALCIFEROL) 1 25 MG (43146 UT) capsule Take 1 capsule (50,000 Units total) by mouth 2 (two) times a week Patient takes this med on   Patient taking differently: Take 50,000 Units by mouth once a week  and Thursdays 3/26/20  Yes Charlton Dancer, MD   ferrous sulfate 325 (65 Fe) mg tablet Take 325 mg by mouth every other day Last took 3/9/20   Yes Historical Provider, MD   ondansetron (ZOFRAN) 4 mg tablet Take 4 mg by mouth every 8 (eight) hours   Yes Historical Provider, MD   other medication, see sig, Infuse 80 mEq into a venous catheter 4 (four) times a week *Potassium infusions 2x week 20 Yes Estiven Cornejo MD   polyethylene glycol (MIRALAX) 17 g packet Take 17 g by mouth daily 20  Yes Estiven Cornejo MD   potassium chloride 0 4 mEq/mL Infuse 100 mL (40 mEq total) into a venous catheter 3 (three) times a week Every tue,thurs,sunday 8/21/20 9/20/20 Yes Asia Olivier MD   potassium chloride 10% oral solution Take 45 mL (60 mEq total) by mouth 6 (six) times a day 8/21/20 9/20/20 Yes Asia Olivier MD   Syringe/Needle, Disp, (SYRINGE 3CC/25GX5/8") 25G X 5/8" 3 ML MISC Use once monthly for B12 injection  6/1/20  Yes Juan Carlos Ricketts DO   topiramate (TOPAMAX) 25 mg tablet Take 1 tablet (25 mg total) by mouth 2 (two) times a day 8/21/20 9/20/20 Yes Asia Olivier MD   acetaminophen (TYLENOL) 325 mg tablet Take 2 tablets (650 mg total) by mouth every 6 (six) hours as needed for mild pain  Patient not taking: Reported on 9/16/2020 8/21/20   Asia Olivier MD   pantoprazole (PROTONIX) 40 mg tablet Take 1 tablet (40 mg total) by mouth daily  Patient not taking: Reported on 9/16/2020 8/10/20   Quan Greene DO   sucralfate (CARAFATE) 1 g tablet Take 1 tablet (1 g total) by mouth daily  Patient not taking: Reported on 9/16/2020 8/10/20   Quan Greene DO   b complex-C-folic acid (NEPHRO-EDIE) 0 8 mg tablet Take 0 8 mg by mouth daily with dinner  9/16/20  Historical Provider, MD   mupirocin (BACTROBAN) 2 % ointment Apply topically 3 (three) times a day for 7 days 9/16/20 9/16/20  Dasha Bowman PA-C   SUMAtriptan (IMITREX) 100 mg tablet Take 100 mg by mouth once as needed for migraine   9/16/20  Historical Provider, MD   thiamine 100 MG tablet Take 100 mg by mouth daily  9/16/20  Historical Provider, MD   zinc sulfate (ZINCATE) 220 mg capsule Take 1 capsule (220 mg total) by mouth daily 8/9/19 9/16/20  Pricila Arnett MD     I have reviewed home medications with patient personally  Allergies: Allergies   Allergen Reactions    Nsaids Other (See Comments)     Other reaction(s): Other (Please comment)  Should not take s/p bariatric surgery  Other reaction(s):  Other (See Comments)  Should not take as per prior records  Should not take s/p bariatric surgery  Has hx of gastric bypass      Prednisone      Nausea, vomiting, diarrhea       Social History:     Marital Status: /Civil Union   Occupation:  Not discussed  Patient Pre-hospital Living Situation:  Home  Patient Pre-hospital Level of Mobility:  Independent  Patient Pre-hospital Diet Restrictions:  Post gastrectomy  Substance Use History:   Social History     Substance and Sexual Activity   Alcohol Use Never    Alcohol/week: 0 0 standard drinks    Frequency: Never    Drinks per session: Patient refused    Binge frequency: Never    Comment: 0     Social History     Tobacco Use   Smoking Status Never Smoker   Smokeless Tobacco Never Used     Social History     Substance and Sexual Activity   Drug Use Never       Family History:    non-contributory    Physical Exam:     Vitals:   Blood Pressure: 97/56 (09/17/20 0038)  Pulse: 71 (09/17/20 0038)  Temperature: (!) 97 4 °F (36 3 °C) (09/17/20 0038)  Temp Source: Temporal (09/16/20 1804)  Respirations: 18 (09/17/20 0038)  Height: 5' 3" (160 cm) (09/16/20 2020)  Weight - Scale: 64 9 kg (143 lb) (09/16/20 2139)  SpO2: 99 % (09/17/20 0038)    Physical Exam  Vitals signs and nursing note reviewed  HENT:      Head: Normocephalic and atraumatic  Mouth/Throat:      Mouth: Mucous membranes are moist       Pharynx: Oropharynx is clear  Eyes:      Extraocular Movements: Extraocular movements intact  Pupils: Pupils are equal, round, and reactive to light  Neck:      Musculoskeletal: Normal range of motion and neck supple  Cardiovascular:      Rate and Rhythm: Normal rate and regular rhythm  Pulses: Normal pulses  Heart sounds: Normal heart sounds  Pulmonary:      Effort: Pulmonary effort is normal       Breath sounds: Normal breath sounds  Abdominal:      General: Abdomen is flat  Bowel sounds are normal       Palpations: Abdomen is soft  Musculoskeletal: Normal range of motion  Skin:     General: Skin is warm and dry        Capillary Refill: Capillary refill takes less than 2 seconds  Comments: Healing laparoscopy sites on abdomen   Neurological:      General: No focal deficit present  Mental Status: She is alert and oriented to person, place, and time  Additional Data:     Lab Results: I have personally reviewed pertinent reports  Results from last 7 days   Lab Units 09/16/20  1832   WBC Thousand/uL 5 98   HEMOGLOBIN g/dL 10 4*   HEMATOCRIT % 32 5*   PLATELETS Thousands/uL 270   NEUTROS PCT % 53   LYMPHS PCT % 36   MONOS PCT % 8   EOS PCT % 2     Results from last 7 days   Lab Units 09/17/20  0156 09/16/20  1904   SODIUM mmol/L 139 142   POTASSIUM mmol/L 2 9* 2 4*   CHLORIDE mmol/L 110* 108   CO2 mmol/L 18* 19*   BUN mg/dL 12 11   CREATININE mg/dL 0 86 0 99   ANION GAP mmol/L 11 15*   CALCIUM mg/dL 7 8* 8 2*   ALBUMIN g/dL  --  3 3*   TOTAL BILIRUBIN mg/dL  --  0 19*   ALK PHOS U/L  --  55   ALT U/L  --  29   AST U/L  --  20   GLUCOSE RANDOM mg/dL 69 78                 Results from last 7 days   Lab Units 09/16/20  1832   LACTIC ACID mmol/L 0 5       Imaging: I have personally reviewed pertinent reports  No orders to display       EKG, Pathology, and Other Studies Reviewed on Admission:   · EKG: NSR rate of 83    Allscripts / Epic Records Reviewed: Yes     ** Please Note: This note has been constructed using a voice recognition system   **

## 2020-09-17 NOTE — ASSESSMENT & PLAN NOTE
· Recurrent severe hypokalemia thought to be due to gastric bypass  · She said her potassium had been fine until she had stopped eating due to left abdominal pain  · Follows outpatient with Nephrology  · Magnesium level is 1 9  · Received 40 mEq orally and 40 mEq IV in ER, with repeat potassium 2 9  · Will give another 40 mEq IV  · Recheck metabolic panel in the morning, trend potassium, replete as needed

## 2020-09-17 NOTE — PLAN OF CARE
Problem: Potential for Falls  Goal: Patient will remain free of falls  Description: INTERVENTIONS:  - Assess patient frequently for physical needs  -  Identify cognitive and physical deficits and behaviors that affect risk of falls  -  [place] fall precautions as indicated by assessment   - Educate patient/family on patient safety including physical limitations  - Instruct patient to call for assistance with activity based on assessment  - Modify environment to reduce risk of injury  - Consider OT/PT consult to assist with strengthening/mobility  Outcome: Progressing     Problem: Nutrition/Hydration-ADULT  Goal: Nutrient/Hydration intake appropriate for improving, restoring or maintaining nutritional needs  Description: Monitor and assess patient's nutrition/hydration status for malnutrition  Collaborate with interdisciplinary team and initiate plan and interventions as ordered  Monitor patient's weight and dietary intake as ordered or per policy  Utilize nutrition screening tool and intervene as necessary  Determine patient's food preferences and provide high-protein, high-caloric foods as appropriate       INTERVENTIONS:  - Monitor oral intake, urinary output, labs, and treatment plans  - Assess nutrition and hydration status and recommend course of action  - Evaluate amount of meals eaten  - Assist patient with eating if necessary   - Allow adequate time for meals  - Recommend/ encourage appropriate diets, oral nutritional supplements, and vitamin/mineral supplements  - Order, calculate, and assess calorie counts as needed  - Recommend, monitor, and adjust tube feedings and TPN/PPN based on assessed needs  - Assess need for intravenous fluids  - Provide specific nutrition/hydration education as appropriate  - Include patient/family/caregiver in decisions related to nutrition  Outcome: Progressing     Problem: PAIN - ADULT  Goal: Verbalizes/displays adequate comfort level or baseline comfort level  Description: Interventions:  - Encourage patient to monitor pain and request assistance  - Assess pain using appropriate pain scale  - Administer analgesics based on type and severity of pain and evaluate response  - Implement non-pharmacological measures as appropriate and evaluate response  - Consider cultural and social influences on pain and pain management  - Notify physician/advanced practitioner if interventions unsuccessful or patient reports new pain  Outcome: Progressing     Problem: INFECTION - ADULT  Goal: Absence or prevention of progression during hospitalization  Description: INTERVENTIONS:  - Assess and monitor for signs and symptoms of infection  - Monitor lab/diagnostic results  - Monitor all insertion sites, i e  indwelling lines, tubes, and drains  - Monitor endotracheal if appropriate and nasal secretions for changes in amount and color  - [place] appropriate cooling/warming therapies per order  - Administer medications as ordered  - Instruct and encourage patient and family to use good hand hygiene technique  - Identify and instruct in appropriate isolation precautions for identified infection/condition  Outcome: Progressing  Goal: Absence of fever/infection during neutropenic period  Description: INTERVENTIONS:  - Monitor WBC    Outcome: Progressing     Problem: SAFETY ADULT  Goal: Patient will remain free of falls  Description: INTERVENTIONS:  - Assess patient frequently for physical needs  -  Identify cognitive and physical deficits and behaviors that affect risk of falls    -  [place] fall precautions as indicated by assessment   - Educate patient/family on patient safety including physical limitations  - Instruct patient to call for assistance with activity based on assessment  - Modify environment to reduce risk of injury  - Consider OT/PT consult to assist with strengthening/mobility  Outcome: Progressing  Goal: Maintain or return to baseline ADL function  Description: INTERVENTIONS:  - Assess patient frequently for physical needs  -  Identify cognitive and physical deficits and behaviors that affect risk of falls    -  [place] fall precautions as indicated by assessment   - Educate patient/family on patient safety including physical limitations  - Instruct patient to call for assistance with activity based on assessment  - Modify environment to reduce risk of injury  - Consider OT/PT consult to assist with strengthening/mobility  Outcome: Progressing  Goal: Maintain or return mobility status to optimal level  Description: INTERVENTIONS:  -  Assess patient's ability to carry out ADLs; assess patient's baseline for ADL function and identify physical deficits which impact ability to perform ADLs (bathing, care of mouth/teeth, toileting, grooming, dressing, etc )  - Assess/evaluate cause of self-care deficits   - Assess range of motion  - Assess patient's mobility; develop plan if impaired  - Assess patient's need for assistive devices and provide as appropriate  - Encourage maximum independence but intervene and supervise when necessary  - Involve family in performance of ADLs  - Assess for home care needs following discharge   - Consider OT consult to assist with ADL evaluation and planning for discharge  - Provide patient education as appropriate  Outcome: Progressing     Problem: DISCHARGE PLANNING  Goal: Discharge to home or other facility with appropriate resources  Description: INTERVENTIONS:  - Identify barriers to discharge w/patient and caregiver  - Arrange for needed discharge resources and transportation as appropriate  - Identify discharge learning needs (meds, wound care, etc )  - Arrange for interpretive services to assist at discharge as needed  - Refer to Case Management Department for coordinating discharge planning if the patient needs post-hospital services based on physician/advanced practitioner order or complex needs related to functional status, cognitive ability, or social support system  Outcome: Progressing     Problem: Knowledge Deficit  Goal: Patient/family/caregiver demonstrates understanding of disease process, treatment plan, medications, and discharge instructions  Description: Complete learning assessment and assess knowledge base    Interventions:  - Provide teaching at level of understanding  - Provide teaching via preferred learning methods  Outcome: Progressing     Problem: GASTROINTESTINAL - ADULT  Goal: Minimal or absence of nausea and/or vomiting  Description: INTERVENTIONS:  - Administer IV fluids if ordered to ensure adequate hydration  - Maintain NPO status until nausea and vomiting are resolved  - Nasogastric tube if ordered  - Administer ordered antiemetic medications as needed  - Provide nonpharmacologic comfort measures as appropriate  - Advance diet as tolerated, if ordered  - Consider nutrition services referral to assist patient with adequate nutrition and appropriate food choices  Outcome: Progressing  Goal: Maintains or returns to baseline bowel function  Description: INTERVENTIONS:  - Assess bowel function  - Encourage oral fluids to ensure adequate hydration  - Administer IV fluids if ordered to ensure adequate hydration  - Administer ordered medications as needed  - Encourage mobilization and activity  - Consider nutritional services referral to assist patient with adequate nutrition and appropriate food choices  Outcome: Progressing  Goal: Maintains adequate nutritional intake  Description: INTERVENTIONS:  - Monitor percentage of each meal consumed  - Identify factors contributing to decreased intake, treat as appropriate  - Assist with meals as needed  - Monitor I&O, weight, and lab values if indicated  - Obtain nutrition services referral as needed  Outcome: Progressing     Problem: METABOLIC, FLUID AND ELECTROLYTES - ADULT  Goal: Electrolytes maintained within normal limits  Description: INTERVENTIONS:  - Monitor labs and assess patient for signs and symptoms of electrolyte imbalances  - Administer electrolyte replacement as ordered  - Monitor response to electrolyte replacements, including repeat lab results as appropriate  - Instruct patient on fluid and nutrition as appropriate  Outcome: Progressing  Goal: Fluid balance maintained  Description: INTERVENTIONS:  - Monitor labs   - Monitor I/O and WT  - Instruct patient on fluid and nutrition as appropriate  - Assess for signs & symptoms of volume excess or deficit  Outcome: Progressing

## 2020-09-17 NOTE — UTILIZATION REVIEW
Initial Clinical Review  OBSERVATION 9/16/20 @1951 CONVERTED TO INPATIENT DUE TO PERSISTENT HYPONATREMIA REQUIRING CONTINUED SUPPLEMENTS OF IV POTASSIUM WITH RECHECK OF BMP  Admission: Date/Time/Statement:     09/17/20 1638    Inpatient Admission  Once      Transfer Service: General Medicine       Question  Answer  Comment    Admitting Physician  NOHEMI CRUM     Level of Care  Med Surg     Estimated length of stay  More than 2 Midnights     Certification  I certify that inpatient services are medically necessary for this patient for a duration of greater than two midnights  See H&P and MD Progress Notes for additional information about the patient's course of treatment  inability to tolerate po and persistent hypokalemia        09/17/20 1638       ED Arrival Information     Expected Arrival Acuity Means of Arrival Escorted By Service Admission Type    - 9/16/2020 17:37 Emergent Walk-In Self Hospitalist Emergency    Arrival Complaint    potassium is low        Chief Complaint   Patient presents with    Abnormal Lab     pt instructed to come to ED per further evaluation abnormal potassium test done this morning  Assessment/Plan: 38year old female to the ED from home for evaluation of low potassium and abdominal pain  H/O gastric bypass, hyporkalemia, migraine, and H-pylori infection  Admitted under observation then converted to inpatient  for hypokalemia, chronic anemia  MOst recent admission for same was 8/17/20-8/21  Given IV potassium and po in the ED  Repeat and trend Potassium  9/17 UPdate: Recurrent severe hypokalemia thought to be due gastric bypass; persistence due to poor po intake due to left lower quad abdominal pain  Give another 40 meq potassium iv  REcheck BMP     ED Triage Vitals   Temperature Pulse Respirations Blood Pressure SpO2   09/16/20 1804 09/16/20 1804 09/16/20 1804 09/16/20 1804 09/16/20 1804   98 2 °F (36 8 °C) 92 20 124/65 98 %      Temp Source Heart Rate Source Patient Position - Orthostatic VS BP Location FiO2 (%)   09/16/20 1804 09/16/20 1804 09/16/20 1804 09/16/20 1804 --   Temporal Monitor Sitting Left arm       Pain Score       09/16/20 1811       5          Wt Readings from Last 1 Encounters:   09/16/20 64 9 kg (143 lb)     Additional Vital Signs:   Time   Temp   Pulse   Resp   BP   MAP (mmHg)   SpO2   O2 Device   Patient Position - Orthostatic VS    09/17/20 11:20:52   97 8 °F (36 6 °C)   85   20   100/66   77   100 %          09/17/20 0821                     None (Room air)       09/17/20 07:43:35   97 9 °F (36 6 °C)   83   19   101/59   73   100 %          09/17/20 00:38:13   97 4 °F (36 3 °C)Abnormal     71   18   97/56   70   99 %          09/16/20 20:19:15   98 1 °F (36 7 °C)   84   18   96/55   69   100 %          09/16/20 1927      84   21   107/67      100 %   None (Room air)   Lying    09/16/20 1836                     None (Room air)       09/16/20 1815      83   22   119/80   94   99 %   None (Room air)   Lying    09/16/20 1804   98 2 °F (36 8 °C)   92   20   124/65              Pertinent Labs/Diagnostic Test Results:         Results from last 7 days   Lab Units 09/17/20  0845 09/16/20  1832   WBC Thousand/uL 5 05 5 98   HEMOGLOBIN g/dL 10 4* 10 4*   HEMATOCRIT % 32 7* 32 5*   PLATELETS Thousands/uL 264 270   NEUTROS ABS Thousands/µL 2 71 3 18         Results from last 7 days   Lab Units 09/17/20  0845 09/17/20  0156 09/16/20  1904 09/16/20  1207   SODIUM mmol/L 139 139 142 141   POTASSIUM mmol/L 3 7 2 9* 2 4* 2 4*   CHLORIDE mmol/L 111* 110* 108 108   CO2 mmol/L 19* 18* 19* 19*   ANION GAP mmol/L 9 11 15* 14*   BUN mg/dL 11 12 11 12   CREATININE mg/dL 0 93 0 86 0 99 1 08   EGFR ml/min/1 73sq m 79 87 74 66   CALCIUM mg/dL 7 8* 7 8* 8 2* 8 2*   MAGNESIUM mg/dL  --   --  1 9  --      Results from last 7 days   Lab Units 09/16/20  1904   AST U/L 20   ALT U/L 29   ALK PHOS U/L 55   TOTAL PROTEIN g/dL 6 5   ALBUMIN g/dL 3 3*   TOTAL BILIRUBIN mg/dL 0 19*         Results from last 7 days   Lab Units 09/17/20  0845 09/17/20  0156 09/16/20  1904 09/16/20  1207   GLUCOSE RANDOM mg/dL 79 69 78 80         Results from last 7 days   Lab Units 09/16/20  1832   LACTIC ACID mmol/L 0 5     Results from last 7 days   Lab Units 09/16/20  1832   CLARITY UA  Slightly Cloudy   COLOR UA  Yellow   SPEC GRAV UA  >=1 030   PH UA  6 0   GLUCOSE UA mg/dl Negative   KETONES UA mg/dl 15 (1+)*   BLOOD UA  Negative   PROTEIN UA mg/dl 30 (1+)*   NITRITE UA  Negative   BILIRUBIN UA  Small*   UROBILINOGEN UA E U /dl 0 2   LEUKOCYTES UA  Negative   WBC UA /hpf None Seen   RBC UA /hpf None Seen   BACTERIA UA /hpf None Seen   EPITHELIAL CELLS WET PREP /hpf Moderate*     ED Treatment:      Date/Time Order Dose Route Action     09/16/2020 1924 sodium chloride 0 9 % bolus 1,000 mL 1,000 mL Intravenous New Bag     09/16/2020 1935 potassium chloride (K-DUR,KLOR-CON) CR tablet 40 mEq 40 mEq Oral Given     09/16/2020 1942 potassium chloride 20 mEq IVPB (premix) 20 mEq Intravenous New Bag        Past Medical History:   Diagnosis Date    H  pylori infection     Hypokalemia     Migraine     Renal disorder     Rhabdomyolysis          Admitting Diagnosis: Hypokalemia [E87 6]  Abnormal labor [O62 9]  Age/Sex: 38 y o  female  Admission Orders:  I/O  UP with assist    Scheduled Medications:  AMILoride, 5 mg, Oral, Daily  amitriptyline, 50 mg, Oral, HS  cyclobenzaprine, 10 mg, Oral, TID  ferrous sulfate, 325 mg, Oral, Every Other Day  polyethylene glycol, 17 g, Oral, Daily  potassium chloride, 60 mEq, Oral, 6x Daily  topiramate, 25 mg, Oral, BID      Continuous IV Infusions:  sodium chloride, 75 mL/hr, Intravenous, Continuous      PRN Meds:  acetaminophen, 650 mg, Oral, Q6H PRN  HYDROmorphone, 0 5 mg, Intravenous, Q4H PRN  ondansetron, 4 mg, Intravenous, Q6H PRN  oxyCODONE, 5 mg, Oral, Q4H PRN        None    Network Utilization Review Department  [email or web address]  ATTENTION: Please call with any questions or concerns to [phone] and carefully listen to the prompts so that you are directed to the right person  All voicemails are confidential   Naomy Piña all requests for admission clinical reviews, approved or denied determinations and any other requests to dedicated fax number below belonging to the campus where the patient is receiving treatment   List of dedicated fax numbers for the Facilities:  [address] DENIALS (Administrative/Medical Necessity) [phone]   [address] (Maternity/NICU/Pediatrics) [phone]   Snow Echavarria [phone]   Meenu Chapa [phone]   Ellie Ceja [phone]   Jerrol Balloon [phone]   [address] [place] [address] [address] [place] [address] [phone]   [facility]  [phone]   [address] [facility] [address]  [address] [facility] [address] [facility] [phone]

## 2020-09-18 ENCOUNTER — APPOINTMENT (INPATIENT)
Dept: NON INVASIVE DIAGNOSTICS | Facility: HOSPITAL | Age: 35
DRG: 641 | End: 2020-09-18
Payer: COMMERCIAL

## 2020-09-18 PROBLEM — R10.32 LEFT LOWER QUADRANT ABDOMINAL PAIN: Status: RESOLVED | Noted: 2020-08-17 | Resolved: 2020-09-18

## 2020-09-18 LAB
ANION GAP SERPL CALCULATED.3IONS-SCNC: 12 MMOL/L (ref 4–13)
ANION GAP SERPL CALCULATED.3IONS-SCNC: 13 MMOL/L (ref 4–13)
ANION GAP SERPL CALCULATED.3IONS-SCNC: 14 MMOL/L (ref 4–13)
BUN SERPL-MCNC: 6 MG/DL (ref 5–25)
BUN SERPL-MCNC: 6 MG/DL (ref 5–25)
BUN SERPL-MCNC: 7 MG/DL (ref 5–25)
CALCIUM SERPL-MCNC: 7.9 MG/DL (ref 8.3–10.1)
CALCIUM SERPL-MCNC: 7.9 MG/DL (ref 8.3–10.1)
CALCIUM SERPL-MCNC: 8 MG/DL (ref 8.3–10.1)
CHLORIDE SERPL-SCNC: 106 MMOL/L (ref 100–108)
CHLORIDE SERPL-SCNC: 107 MMOL/L (ref 100–108)
CHLORIDE SERPL-SCNC: 109 MMOL/L (ref 100–108)
CO2 SERPL-SCNC: 17 MMOL/L (ref 21–32)
CREAT SERPL-MCNC: 0.86 MG/DL (ref 0.6–1.3)
CREAT SERPL-MCNC: 0.93 MG/DL (ref 0.6–1.3)
CREAT SERPL-MCNC: 0.94 MG/DL (ref 0.6–1.3)
GFR SERPL CREATININE-BSD FRML MDRD: 78 ML/MIN/1.73SQ M
GFR SERPL CREATININE-BSD FRML MDRD: 79 ML/MIN/1.73SQ M
GFR SERPL CREATININE-BSD FRML MDRD: 87 ML/MIN/1.73SQ M
GLUCOSE SERPL-MCNC: 76 MG/DL (ref 65–140)
GLUCOSE SERPL-MCNC: 95 MG/DL (ref 65–140)
GLUCOSE SERPL-MCNC: 99 MG/DL (ref 65–140)
MAGNESIUM SERPL-MCNC: 1.9 MG/DL (ref 1.6–2.6)
POTASSIUM SERPL-SCNC: 2.9 MMOL/L (ref 3.5–5.3)
POTASSIUM SERPL-SCNC: 3.1 MMOL/L (ref 3.5–5.3)
POTASSIUM SERPL-SCNC: 3.2 MMOL/L (ref 3.5–5.3)
SODIUM SERPL-SCNC: 135 MMOL/L (ref 136–145)
SODIUM SERPL-SCNC: 137 MMOL/L (ref 136–145)
SODIUM SERPL-SCNC: 140 MMOL/L (ref 136–145)

## 2020-09-18 PROCEDURE — 99232 SBSQ HOSP IP/OBS MODERATE 35: CPT | Performed by: STUDENT IN AN ORGANIZED HEALTH CARE EDUCATION/TRAINING PROGRAM

## 2020-09-18 PROCEDURE — 80048 BASIC METABOLIC PNL TOTAL CA: CPT | Performed by: STUDENT IN AN ORGANIZED HEALTH CARE EDUCATION/TRAINING PROGRAM

## 2020-09-18 PROCEDURE — 99254 IP/OBS CNSLTJ NEW/EST MOD 60: CPT | Performed by: INTERNAL MEDICINE

## 2020-09-18 PROCEDURE — 83735 ASSAY OF MAGNESIUM: CPT | Performed by: STUDENT IN AN ORGANIZED HEALTH CARE EDUCATION/TRAINING PROGRAM

## 2020-09-18 RX ORDER — POTASSIUM CHLORIDE 14.9 MG/ML
20 INJECTION INTRAVENOUS ONCE
Status: DISCONTINUED | OUTPATIENT
Start: 2020-09-18 | End: 2020-09-18

## 2020-09-18 RX ORDER — POTASSIUM CHLORIDE 20 MEQ/1
40 TABLET, EXTENDED RELEASE ORAL ONCE
Status: DISCONTINUED | OUTPATIENT
Start: 2020-09-19 | End: 2020-09-18

## 2020-09-18 RX ORDER — POTASSIUM CHLORIDE 14.9 MG/ML
20 INJECTION INTRAVENOUS
Status: DISCONTINUED | OUTPATIENT
Start: 2020-09-18 | End: 2020-09-18

## 2020-09-18 RX ORDER — POTASSIUM CHLORIDE 14.9 MG/ML
20 INJECTION INTRAVENOUS
Status: DISPENSED | OUTPATIENT
Start: 2020-09-18 | End: 2020-09-18

## 2020-09-18 RX ORDER — POTASSIUM CHLORIDE 20MEQ/15ML
40 LIQUID (ML) ORAL
Status: DISCONTINUED | OUTPATIENT
Start: 2020-09-18 | End: 2020-09-18

## 2020-09-18 RX ORDER — DEXTROSE, SODIUM CHLORIDE, AND POTASSIUM CHLORIDE 5; .45; .3 G/100ML; G/100ML; G/100ML
75 INJECTION INTRAVENOUS CONTINUOUS
Status: DISCONTINUED | OUTPATIENT
Start: 2020-09-18 | End: 2020-09-20 | Stop reason: HOSPADM

## 2020-09-18 RX ORDER — POTASSIUM CHLORIDE 14.9 MG/ML
40 INJECTION INTRAVENOUS ONCE
Status: COMPLETED | OUTPATIENT
Start: 2020-09-18 | End: 2020-09-19

## 2020-09-18 RX ORDER — POTASSIUM CHLORIDE 20 MEQ/1
40 TABLET, EXTENDED RELEASE ORAL ONCE
Status: DISCONTINUED | OUTPATIENT
Start: 2020-09-18 | End: 2020-09-18

## 2020-09-18 RX ADMIN — TOPIRAMATE 25 MG: 25 TABLET, FILM COATED ORAL at 09:17

## 2020-09-18 RX ADMIN — POTASSIUM CHLORIDE 60 MEQ: 20 SOLUTION ORAL at 09:17

## 2020-09-18 RX ADMIN — SODIUM CHLORIDE 75 ML/HR: 0.9 INJECTION, SOLUTION INTRAVENOUS at 02:11

## 2020-09-18 RX ADMIN — ONDANSETRON 4 MG: 2 INJECTION INTRAMUSCULAR; INTRAVENOUS at 18:43

## 2020-09-18 RX ADMIN — AMILORIDE HYDROCHLORIDE 5 MG: 5 TABLET ORAL at 10:47

## 2020-09-18 RX ADMIN — POLYETHYLENE GLYCOL 3350 17 G: 17 POWDER, FOR SOLUTION ORAL at 09:17

## 2020-09-18 RX ADMIN — HYDROMORPHONE HYDROCHLORIDE 0.5 MG: 1 INJECTION, SOLUTION INTRAMUSCULAR; INTRAVENOUS; SUBCUTANEOUS at 09:23

## 2020-09-18 RX ADMIN — HYDROMORPHONE HYDROCHLORIDE 0.5 MG: 1 INJECTION, SOLUTION INTRAMUSCULAR; INTRAVENOUS; SUBCUTANEOUS at 22:23

## 2020-09-18 RX ADMIN — HYDROMORPHONE HYDROCHLORIDE 0.5 MG: 1 INJECTION, SOLUTION INTRAMUSCULAR; INTRAVENOUS; SUBCUTANEOUS at 17:48

## 2020-09-18 RX ADMIN — POTASSIUM CHLORIDE 60 MEQ: 20 SOLUTION ORAL at 12:00

## 2020-09-18 RX ADMIN — POTASSIUM CHLORIDE 40 MEQ: 14.9 INJECTION, SOLUTION INTRAVENOUS at 21:29

## 2020-09-18 RX ADMIN — HYDROMORPHONE HYDROCHLORIDE 0.5 MG: 1 INJECTION, SOLUTION INTRAMUSCULAR; INTRAVENOUS; SUBCUTANEOUS at 13:48

## 2020-09-18 RX ADMIN — ONDANSETRON 4 MG: 2 INJECTION INTRAMUSCULAR; INTRAVENOUS at 11:51

## 2020-09-18 RX ADMIN — POTASSIUM CHLORIDE 20 MEQ: 14.9 INJECTION, SOLUTION INTRAVENOUS at 11:25

## 2020-09-18 RX ADMIN — DEXTROSE, SODIUM CHLORIDE, AND POTASSIUM CHLORIDE 75 ML/HR: 5; .45; .3 INJECTION INTRAVENOUS at 13:41

## 2020-09-18 RX ADMIN — CYCLOBENZAPRINE HYDROCHLORIDE 10 MG: 10 TABLET, FILM COATED ORAL at 09:16

## 2020-09-18 RX ADMIN — POTASSIUM CHLORIDE 60 MEQ: 20 SOLUTION ORAL at 06:08

## 2020-09-18 RX ADMIN — CYCLOBENZAPRINE HYDROCHLORIDE 10 MG: 10 TABLET, FILM COATED ORAL at 16:36

## 2020-09-18 RX ADMIN — CYCLOBENZAPRINE HYDROCHLORIDE 10 MG: 10 TABLET, FILM COATED ORAL at 21:29

## 2020-09-18 RX ADMIN — OXYCODONE HYDROCHLORIDE 5 MG: 5 TABLET ORAL at 12:00

## 2020-09-18 RX ADMIN — FERROUS SULFATE TAB 325 MG (65 MG ELEMENTAL FE) 325 MG: 325 (65 FE) TAB at 09:16

## 2020-09-18 RX ADMIN — POTASSIUM CHLORIDE 20 MEQ: 14.9 INJECTION, SOLUTION INTRAVENOUS at 00:33

## 2020-09-18 RX ADMIN — AMITRIPTYLINE HYDROCHLORIDE 50 MG: 25 TABLET, FILM COATED ORAL at 21:29

## 2020-09-18 NOTE — UTILIZATION REVIEW
Notification of Inpatient Admission/Inpatient Authorization Request   This is a Notification of Inpatient Admission for 5 Adithya Liace  Be advised that this patient was admitted to our facility under Inpatient Status  Contact Zenaida Lozano at [phone] for additional admission information  [facility]  DEDICATED -[phone]  Patient Name:   Erin Tyler   YOB: 1985       [address]   [address]:   [place] [address]  Tax ID: [id number]  NPI: [id number] Attending Provider/NPI:  Phone:  Address: Colleen Brumfield, Opal Hsu [[id number]]   [phone]  Same as [facility]/Robinson Leblanc 1106 of Service Code: 24 Place of Service Name:  [address] [place] [address]   Start Date: 9/17/20 1638 Discharge Date & Time: No discharge date for patient encounter  Type of Admission: Inpatient Status Discharge Disposition (if discharged): Home/Self Care   Patient Diagnoses: Hypokalemia [E87 6]  Abnormal labor [O62 9]     Orders: Admission Orders (From admission, onward)     Ordered        09/17/20 1638  Inpatient Admission  Once         09/16/20 1951  Place in Observation  Once                    Assigned Utilization Review Contact: Zenaida Lozano  Utilization   Network Utilization Review Department  Phone: [phone]; Fax [phone]  Email: [email or web address]   ATTENTION PAYERS: Please call the assigned Utilization  directly with any questions or concerns ALL voicemails in the department are confidential  Send all requests for admission clinical reviews, approved or denied determinations and any other requests to dedicated fax number belonging to the campus where the patient is receiving treatment

## 2020-09-18 NOTE — PLAN OF CARE
Problem: Potential for Falls  Goal: Patient will remain free of falls  Description: INTERVENTIONS:  - Assess patient frequently for physical needs  -  Identify cognitive and physical deficits and behaviors that affect risk of falls  -  [place] fall precautions as indicated by assessment   - Educate patient/family on patient safety including physical limitations  - Instruct patient to call for assistance with activity based on assessment  - Modify environment to reduce risk of injury  - Consider OT/PT consult to assist with strengthening/mobility  Outcome: Progressing     Problem: Nutrition/Hydration-ADULT  Goal: Nutrient/Hydration intake appropriate for improving, restoring or maintaining nutritional needs  Description: Monitor and assess patient's nutrition/hydration status for malnutrition  Collaborate with interdisciplinary team and initiate plan and interventions as ordered  Monitor patient's weight and dietary intake as ordered or per policy  Utilize nutrition screening tool and intervene as necessary  Determine patient's food preferences and provide high-protein, high-caloric foods as appropriate       INTERVENTIONS:  - Monitor oral intake, urinary output, labs, and treatment plans  - Assess nutrition and hydration status and recommend course of action  - Evaluate amount of meals eaten  - Assist patient with eating if necessary   - Allow adequate time for meals  - Recommend/ encourage appropriate diets, oral nutritional supplements, and vitamin/mineral supplements  - Order, calculate, and assess calorie counts as needed  - Recommend, monitor, and adjust tube feedings and TPN/PPN based on assessed needs  - Assess need for intravenous fluids  - Provide specific nutrition/hydration education as appropriate  - Include patient/family/caregiver in decisions related to nutrition  Outcome: Progressing     Problem: PAIN - ADULT  Goal: Verbalizes/displays adequate comfort level or baseline comfort level  Description: Interventions:  - Encourage patient to monitor pain and request assistance  - Assess pain using appropriate pain scale  - Administer analgesics based on type and severity of pain and evaluate response  - Implement non-pharmacological measures as appropriate and evaluate response  - Consider cultural and social influences on pain and pain management  - Notify physician/advanced practitioner if interventions unsuccessful or patient reports new pain  Outcome: Progressing     Problem: INFECTION - ADULT  Goal: Absence or prevention of progression during hospitalization  Description: INTERVENTIONS:  - Assess and monitor for signs and symptoms of infection  - Monitor lab/diagnostic results  - Monitor all insertion sites, i e  indwelling lines, tubes, and drains  - Monitor endotracheal if appropriate and nasal secretions for changes in amount and color  - [place] appropriate cooling/warming therapies per order  - Administer medications as ordered  - Instruct and encourage patient and family to use good hand hygiene technique  - Identify and instruct in appropriate isolation precautions for identified infection/condition  Outcome: Progressing  Goal: Absence of fever/infection during neutropenic period  Description: INTERVENTIONS:  - Monitor WBC    Outcome: Progressing     Problem: SAFETY ADULT  Goal: Patient will remain free of falls  Description: INTERVENTIONS:  - Assess patient frequently for physical needs  -  Identify cognitive and physical deficits and behaviors that affect risk of falls    -  [place] fall precautions as indicated by assessment   - Educate patient/family on patient safety including physical limitations  - Instruct patient to call for assistance with activity based on assessment  - Modify environment to reduce risk of injury  - Consider OT/PT consult to assist with strengthening/mobility  Outcome: Progressing  Goal: Maintain or return to baseline ADL function  Description: INTERVENTIONS:  -  Assess patient's ability to carry out ADLs; assess patient's baseline for ADL function and identify physical deficits which impact ability to perform ADLs (bathing, care of mouth/teeth, toileting, grooming, dressing, etc )  - Assess/evaluate cause of self-care deficits   - Assess range of motion  - Assess patient's mobility; develop plan if impaired  - Assess patient's need for assistive devices and provide as appropriate  - Encourage maximum independence but intervene and supervise when necessary  - Involve family in performance of ADLs  - Assess for home care needs following discharge   - Consider OT consult to assist with ADL evaluation and planning for discharge  - Provide patient education as appropriate  Outcome: Progressing  Goal: Maintain or return mobility status to optimal level  Description: INTERVENTIONS:  - Assess patient's baseline mobility status (ambulation, transfers, stairs, etc )    - Identify cognitive and physical deficits and behaviors that affect mobility  - Identify mobility aids required to assist with transfers and/or ambulation (gait belt, sit-to-stand, lift, walker, cane, etc )  - [place] fall precautions as indicated by assessment  - Record patient progress and toleration of activity level on Mobility SBAR; progress patient to next Phase/Stage  - Instruct patient to call for assistance with activity based on assessment  - Consider rehabilitation consult to assist with strengthening/weightbearing, etc   Outcome: Progressing     Problem: DISCHARGE PLANNING  Goal: Discharge to home or other facility with appropriate resources  Description: INTERVENTIONS:  - Identify barriers to discharge w/patient and caregiver  - Arrange for needed discharge resources and transportation as appropriate  - Identify discharge learning needs (meds, wound care, etc )  - Arrange for interpretive services to assist at discharge as needed  - Refer to Case Management Department for coordinating discharge planning if the patient needs post-hospital services based on physician/advanced practitioner order or complex needs related to functional status, cognitive ability, or social support system  Outcome: Progressing     Problem: Knowledge Deficit  Goal: Patient/family/caregiver demonstrates understanding of disease process, treatment plan, medications, and discharge instructions  Description: Complete learning assessment and assess knowledge base    Interventions:  - Provide teaching at level of understanding  - Provide teaching via preferred learning methods  Outcome: Progressing     Problem: GASTROINTESTINAL - ADULT  Goal: Minimal or absence of nausea and/or vomiting  Description: INTERVENTIONS:  - Administer IV fluids if ordered to ensure adequate hydration  - Maintain NPO status until nausea and vomiting are resolved  - Nasogastric tube if ordered  - Administer ordered antiemetic medications as needed  - Provide nonpharmacologic comfort measures as appropriate  - Advance diet as tolerated, if ordered  - Consider nutrition services referral to assist patient with adequate nutrition and appropriate food choices  Outcome: Progressing  Goal: Maintains or returns to baseline bowel function  Description: INTERVENTIONS:  - Assess bowel function  - Encourage oral fluids to ensure adequate hydration  - Administer IV fluids if ordered to ensure adequate hydration  - Administer ordered medications as needed  - Encourage mobilization and activity  - Consider nutritional services referral to assist patient with adequate nutrition and appropriate food choices  Outcome: Progressing  Goal: Maintains adequate nutritional intake  Description: INTERVENTIONS:  - Monitor percentage of each meal consumed  - Identify factors contributing to decreased intake, treat as appropriate  - Assist with meals as needed  - Monitor I&O, weight, and lab values if indicated  - Obtain nutrition services referral as needed  Outcome: Progressing     Problem: METABOLIC, FLUID AND ELECTROLYTES - ADULT  Goal: Electrolytes maintained within normal limits  Description: INTERVENTIONS:  - Monitor labs and assess patient for signs and symptoms of electrolyte imbalances  - Administer electrolyte replacement as ordered  - Monitor response to electrolyte replacements, including repeat lab results as appropriate  - Instruct patient on fluid and nutrition as appropriate  Outcome: Progressing  Goal: Fluid balance maintained  Description: INTERVENTIONS:  - Monitor labs   - Monitor I/O and WT  - Instruct patient on fluid and nutrition as appropriate  - Assess for signs & symptoms of volume excess or deficit  Outcome: Progressing

## 2020-09-18 NOTE — ASSESSMENT & PLAN NOTE
· Recurrent severe hypokalemia thought to be due gastric bypass; persistence due to poor po intake  · Poor po intake 2/2 persistent severe left lower quadrant abdominal pain  · Follows outpatient with Nephrology; consulted during this admission as well; recommendations appreciated  · Magnesium level is 1 9  · Was originally on po potassium however persistence of abdominal pain possibly secondary to repeated high doses of po potassium  · Discontinued po potassium as per Nephrology recommendations, will supplement K via IV for now and will continue to monitor response  · Recheck metabolic panel in the morning, trend potassium, replete as needed; ensure Mg wnl as well

## 2020-09-18 NOTE — ASSESSMENT & PLAN NOTE
· Patient with persistent LLQ pain, worse with meals  · Due to severe LLQ pain, patient unable to tolerate po, contributing to persistent hypokalemia  · Previous workup unremarkable (CT abdomen, colonoscopy, EGD, exploratory laparotomy)  · GI recommendations appreciated, will assess patient's condition over the weekend to see if patient responds to discontinuation of po K and topiramate; if with improvement, will plan for additional recommended exams to be done as outpatient  · Continue with pain management at this time

## 2020-09-18 NOTE — PROGRESS NOTES
Progress Note - Vega Diggs 1985, 38 y o  female MRN: [id number]    Unit/Bed#: -01 Encounter: [id number]    Primary Care Provider: Tan Martínez DO   Date and time admitted to hospital: 9/16/2020  6:01 PM        Left lower quadrant abdominal pain  Assessment & Plan  · Patient with persistent LLQ pain, worse with meals  · Due to severe LLQ pain, patient unable to tolerate po, contributing to persistent hypokalemia  · Previous workup unremarkable (CT abdomen, colonoscopy, EGD, exploratory laparotomy)  · GI recommendations appreciated, will assess patient's condition over the weekend to see if patient responds to discontinuation of po K and topiramate; if with improvement, will plan for additional recommended exams to be done as outpatient  · Continue with pain management at this time    * Hypokalemia  Assessment & Plan  · Recurrent severe hypokalemia thought to be due gastric bypass; persistence due to poor po intake  · Poor po intake 2/2 persistent severe left lower quadrant abdominal pain  · Follows outpatient with Nephrology; consulted during this admission as well; recommendations appreciated  · Magnesium level is 1 9  · Was originally on po potassium however persistence of abdominal pain possibly secondary to repeated high doses of po potassium  · Discontinued po potassium as per Nephrology recommendations, will supplement K via IV for now and will continue to monitor response  · Recheck metabolic panel in the morning, trend potassium, replete as needed; ensure Mg wnl as well              Chronic anemia  Assessment & Plan  · Hemoglobin 10 4, was 13 5 in August 2020  · Daily CBC and trend hemoglobin  · Continue iron    Migraine without aura and without status migrainosus, not intractable  Assessment & Plan  · Currently controlled  · Discontinued topiramate as this may contribute to metabolic abnormalities  · Patient may opt to take sumatriptan to abort any migraines that should occur    History of gastric bypass  Assessment & Plan  · Chronic malabsorption with acute on chronic hypokalemia  · Had exploratory laparoscopy 2020-unable to find results in Care everywhere  · Continue outpatient follow-up with bariatric surgery      VTE Pharmacologic Prophylaxis:   Pharmacologic: Enoxaparin (Lovenox)  Mechanical VTE Prophylaxis in Place: Yes    Patient Centered Rounds: I have performed bedside rounds with nursing staff today  Discussions with Specialists or Other Care Team Provider: with nephrology and GI    Education and Discussions with Family / Patient: with patient    Time Spent for Care: 30 minutes  More than 50% of total time spent on counseling and coordination of care as described above  Current Length of Stay: 1 day(s)    Current Patient Status: Inpatient   Certification Statement: The patient will continue to require additional inpatient hospital stay due to inability to tolerate po and persistent hypokalemia    Discharge Plan: discharge home once stable    Code Status: Level 1 - Full Code      Subjective:   Patient seen examined at bedside  Patient is still complaining of left lower quadrant pain  Severity of pain is stated to be the same  Patient states that she is able to tolerate p o  If she takes her food after pain medication and anti nausea medications  Patient tolerated mac and cheese yesterday  Patient describes the pain to be crampy abdominal pain in the left lower quadrant  Patient states that the pain is still aggravated by food intake  Patient with no other symptoms at this time  Denies any bloody bowel movements or changes in bowel habits  Patient denies any fevers chills        Objective:     Vitals:   Temp (24hrs), Av 1 °F (36 7 °C), Min:97 8 °F (36 6 °C), Max:98 5 °F (36 9 °C)    Temp:  [97 8 °F (36 6 °C)-98 5 °F (36 9 °C)] 98 3 °F (36 8 °C)  HR:  [77-95] 95  Resp:  [14-18] 18  BP: ()/(48-77) 112/77  SpO2:  [98 %-100 %] 100 %  Body mass index is 25 46 kg/m²  Input and Output Summary (last 24 hours): Intake/Output Summary (Last 24 hours) at 9/18/2020 1521  Last data filed at 9/18/2020 1125  Gross per 24 hour   Intake 1326 25 ml   Output    Net 1326 25 ml       Physical Exam:     Physical Exam  Constitutional:       Appearance: She is obese  HENT:      Head: Normocephalic and atraumatic  Mouth/Throat:      Mouth: Mucous membranes are moist    Eyes:      Extraocular Movements: Extraocular movements intact  Conjunctiva/sclera: Conjunctivae normal       Pupils: Pupils are equal, round, and reactive to light  Neck:      Musculoskeletal: Normal range of motion and neck supple  Cardiovascular:      Rate and Rhythm: Normal rate and regular rhythm  Pulses: Normal pulses  Heart sounds: Normal heart sounds  Pulmonary:      Effort: Pulmonary effort is normal       Breath sounds: No wheezing or rales  Abdominal:      General: Bowel sounds are normal  There is no distension  Palpations: Abdomen is soft  Tenderness: There is abdominal tenderness  Skin:     General: Skin is warm and dry  Neurological:      General: No focal deficit present  Mental Status: She is alert and oriented to person, place, and time     Psychiatric:         Mood and Affect: Mood normal          Additional Data:     Labs:    Results from last 7 days   Lab Units 09/17/20  0845   WBC Thousand/uL 5 05   HEMOGLOBIN g/dL 10 4*   HEMATOCRIT % 32 7*   PLATELETS Thousands/uL 264   NEUTROS PCT % 54   LYMPHS PCT % 35   MONOS PCT % 8   EOS PCT % 2     Results from last 7 days   Lab Units 09/18/20  1348  09/16/20  1904   SODIUM mmol/L 135*   < > 142   POTASSIUM mmol/L 3 2*   < > 2 4*   CHLORIDE mmol/L 106   < > 108   CO2 mmol/L 17*   < > 19*   BUN mg/dL 6   < > 11   CREATININE mg/dL 0 93   < > 0 99   ANION GAP mmol/L 12   < > 15*   CALCIUM mg/dL 8 0*   < > 8 2*   ALBUMIN g/dL  --   --  3 3*   TOTAL BILIRUBIN mg/dL  --   --  0 19*   ALK PHOS U/L --   --  55   ALT U/L  --   --  29   AST U/L  --   --  20   GLUCOSE RANDOM mg/dL 99   < > 78    < > = values in this interval not displayed  Results from last 7 days   Lab Units 09/16/20  1832   LACTIC ACID mmol/L 0 5           * I Have Reviewed All Lab Data Listed Above  * Additional Pertinent Lab Tests Reviewed: Veto 66 Admission Reviewed    Imaging: none    Recent Cultures (last 7 days): none          Last 24 Hours Medication List:   Current Facility-Administered Medications   Medication Dose Route Frequency Provider Last Rate    acetaminophen  650 mg Oral Q6H PRN Chano Garcia, CRNP      AMILoride  5 mg Oral Daily Chano Garcia, CRNP      amitriptyline  50 mg Oral HS Chano Garcia, CRNP      cyclobenzaprine  10 mg Oral TID Chano Garcia, CRNP      dextrose 5 % and sodium chloride 0 45 % with KCl 40 mEq/L  75 mL/hr Intravenous Continuous Juan Carlos Varvarelis, DO 75 mL/hr (09/18/20 1341)    enoxaparin  40 mg Subcutaneous Q24H [address] Thuy Elsa Joseph MD      ferrous sulfate  325 mg Oral Every Other Day Chano Garcia, CRNP      HYDROmorphone  0 5 mg Intravenous Q4H PRN Chano Garcia, CRNP      ondansetron  4 mg Intravenous Q6H PRN Chano Garcia, CRNP      oxyCODONE  5 mg Oral Q4H PRN Chano Garcia, CRNP      polyethylene glycol  17 g Oral Daily Chano Garcia, CRNP          Today, Patient Was Seen By: Pebbles George MD    ** Please Note: Dictation voice to text software may have been used in the creation of this document   **

## 2020-09-18 NOTE — NURSING NOTE
Test cannot be done today, since the patient has not been NPO  It will have to be done on Monday, or as an outpatient  She said it can be done as an outpatient  Ultrasound technician made aware

## 2020-09-18 NOTE — CONSULTS
Consultation - Nephrology   Eveline Chandra 38 y o  female MRN: [id number]  Unit/Bed#: -01 Encounter: [id number]      A/P:  1  Hypokalemia   Continue with aggressive IV supplementation  Will transition the patient's normal saline to D5 half-normal with 40 mEq per L potassium chloride  Continue with IV potassium supplementation in the meantime  Should aim for at least 100 mEq of IV potassium over 24 hour period  After discussion with the hospitalist, we will hold the patient's oral potassium, please refer below  Who focus instead on maximizing IV potassium infusion  2  Acidosis   Patient has been experiencing vomiting, therefore the most likely etiology the acidosis is either respiratory alkalosis or medication induced acidosis  Patient is on Topiramate which is known to cause acidosis, in addition, unclear if this medication was also positive abdominal pain, therefore we will discontinue this medication which is currently being given for migraine prophylaxis  If patient's serum bicarbonate does not improve with this treatment change, will consider checking an arterial blood gas to further evaluate distinguish between respiratory and metabolic cause  3  Abdominal pain   As mentioned above  All oral potassium supplementation for 24 hours, will also stop Topiramate in case this is upsetting/causing abdominal pain  4  Migraine   As mentioned above, will discontinue Topiramate for potential cause of acidosis as well as abdominal pain  Patient will have Imitrex available as needed for abortive migraine agent  5  History of gastric bypass          Thank you for allowing us to participate in the care of your patient  Please feel free to contact us regarding the care of this patient, or any other questions/concerns that may be applicable      Patient Active Problem List   Diagnosis    Hypokalemia    History of gastric bypass    Migraine without aura and without status migrainosus, not intractable    Left-sided weakness    Hypophosphatemia    Chronic anemia    Vitamin D deficiency    Elevated CPK    Vitamin B12 deficiency    Cervical lymphadenopathy    Fatigue    Paresthesia of both lower extremities    Paresthesias    B12 deficiency    Gastric bypass status for obesity    GERD (gastroesophageal reflux disease)    Migraine    WAGNER (obstructive sleep apnea)    Other intestinal malabsorption    Acute kidney injury ([address] )    Right ovarian cyst    Chest pain    Nausea & vomiting    Vertigo    Stress fracture of right foot with routine healing    Normal anion gap metabolic acidosis    Middle ear effusion    Left lower quadrant abdominal pain       History of Present Illness   Physician Requesting Consult: Russ Gusman *  Reason for Consult / Principal Problem:  Hypokalemia  Hx and PE limited by:   HPI: Colton Davenport is a 38y o  year old female who presented to the emergency September 16th due to hypokalemia below 2 5 millimole/L  The patient has an established history of hypokalemia for several years now, her outpatient regimen includes 60 mEq of oral potassium chloride 6 times a day, in addition to daily infusions of IV potassium chloride typically 40 mEq a day  On Sunday, September 13th, patient began to experience significant abdominal pain which reduced the amount of food/liquids and oral potassium supplementation should take  On Monday, the patient started to take increased doses of IV potassium due to the decreased oral intake  Despite the increase in the IV potassium infusions, patient's serum potassium level fell to 2 4 millimole/L  At that time our office contacted her and asked that she present to the emergency room due to the significant drop in potassium, associated paresthesias and muscular weakness the patient felt    Since presentation, the patient has been given IV potassium, unfortunately, she continues to have significant abdominal pain and oral potassium intake has been reduced to not at typical dosing  Current serum potassium levels are a little lower than normal and is being supplemented reviewed the IV as well as oral route  From the abdominal pain standpoint, the patient has had a workup with bariatric, who performed with an endoscopy as well as exploratory laparotomy to further investigate  She has also been seen by Gastroenterology in the recent past   There is no evidence of anatomic issues nor evidence of gastric ulceration  Patient denies use of nonsteroidal anti-inflammatory medications  History obtained from chart review and the patient    Review of Systems - Negative except as mentioned above in HPI, more specifics as mentioned below  Review of Systems - General ROS: positive for  - fatigue  Psychological ROS: negative  Ophthalmic ROS: negative  ENT ROS: negative  Allergy and Immunology ROS: negative  Hematological and Lymphatic ROS: negative  Endocrine ROS: negative  Respiratory ROS: no cough, shortness of breath, or wheezing  Cardiovascular ROS: no chest pain or dyspnea on exertion  Gastrointestinal ROS:  As mentioned above  Genito-Urinary ROS: negative  Musculoskeletal ROS: positive for - muscular weakness  Neurological ROS: no TIA or stroke symptoms  Dermatological ROS: negative    Historical Information   Past Medical History:   Diagnosis Date    H  pylori infection     Hypokalemia     Migraine     Renal disorder     Rhabdomyolysis      Past Surgical History:   Procedure Laterality Date    ABDOMINAL SURGERY      APPENDECTOMY       SECTION      CHOLECYSTECTOMY      COSMETIC SURGERY      "tummy tuck"    FL GUIDED CENTRAL VENOUS ACCESS DEVICE INSERTION  2018    GASTRIC BYPASS      HYSTERECTOMY      LAPAROSCOPY      TUNNELED VENOUS PORT PLACEMENT N/A 2018    Procedure: INSERTION VENOUS PORT (PORT-A-CATH);   Surgeon: Lissa Beasley DO;  Location: [facility];  Service: General     Social History   Social History Substance and Sexual Activity   Alcohol Use Never    Alcohol/week: 0 0 standard drinks    Frequency: Never    Drinks per session: Patient refused    Binge frequency: Never    Comment: 0     Social History     Substance and Sexual Activity   Drug Use Never     Social History     Tobacco Use   Smoking Status Never Smoker   Smokeless Tobacco Never Used     Family History   Problem Relation Age of Onset    No Known Problems Mother     Hypertension Father     Diabetes Father     Diabetes Maternal Grandfather        Meds/Allergies   all current active meds have been reviewed, current meds:   Current Facility-Administered Medications   Medication Dose Route Frequency    acetaminophen (TYLENOL) tablet 650 mg  650 mg Oral Q6H PRN    AMILoride tablet 5 mg  5 mg Oral Daily    amitriptyline (ELAVIL) tablet 50 mg  50 mg Oral HS    cyclobenzaprine (FLEXERIL) tablet 10 mg  10 mg Oral TID    dextrose 5 % and sodium chloride 0 45 % with KCl 40 mEq/L infusion (premix)  75 mL/hr Intravenous Continuous    enoxaparin (LOVENOX) subcutaneous injection 40 mg  40 mg Subcutaneous Q24H MIKE    ferrous sulfate tablet 325 mg  325 mg Oral Every Other Day    HYDROmorphone (DILAUDID) injection 0 5 mg  0 5 mg Intravenous Q4H PRN    ondansetron (ZOFRAN) injection 4 mg  4 mg Intravenous Q6H PRN    oxyCODONE (ROXICODONE) IR tablet 5 mg  5 mg Oral Q4H PRN    polyethylene glycol (MIRALAX) packet 17 g  17 g Oral Daily    potassium chloride 20 mEq IVPB (premix)  20 mEq Intravenous Q2H    and PTA meds:    Medications Prior to Admission   Medication    AMILoride 5 mg tablet    amitriptyline (ELAVIL) 50 mg tablet    Catheters MISC    cyanocobalamin 1,000 mcg/mL    cyclobenzaprine (FLEXERIL) 10 mg tablet    ergocalciferol (ERGOCALCIFEROL) 1 25 MG (39325 UT) capsule    ferrous sulfate 325 (65 Fe) mg tablet    ondansetron (ZOFRAN) 4 mg tablet    other medication, see sig,    polyethylene glycol (MIRALAX) 17 g packet    potassium chloride 0 4 mEq/mL    potassium chloride 10% oral solution    Syringe/Needle, Disp, (SYRINGE 3CC/25GX5/8") 25G X 5/8" 3 ML MISC    topiramate (TOPAMAX) 25 mg tablet    acetaminophen (TYLENOL) 325 mg tablet    pantoprazole (PROTONIX) 40 mg tablet    sucralfate (CARAFATE) 1 g tablet         Allergies   Allergen Reactions    Nsaids Other (See Comments)     Other reaction(s): Other (Please comment)  Should not take s/p bariatric surgery  Other reaction(s): Other (See Comments)  Should not take as per prior records  Should not take s/p bariatric surgery  Has hx of gastric bypass      Prednisone      Nausea, vomiting, diarrhea       Objective     Intake/Output Summary (Last 24 hours) at 9/18/2020 1241  Last data filed at 9/18/2020 1125  Gross per 24 hour   Intake 1326 25 ml   Output    Net 1326 25 ml       Invasive Devices:        Physical Exam      I/O last 3 completed shifts: In: 2567 5 [P O :480; I V :2087 5]  Out: -     Vitals:    09/18/20 0825   BP: 112/77   Pulse: 95   Resp: 18   Temp: 98 3 °F (36 8 °C)   SpO2: 100%       Gen: in NAD, Alert/Awake  HEENT: no sclerous icterus, MMM, neck supple  CV: +S1/S2, RRR  Lungs: CTA bilaterally  Abd: +BS, soft NT/ND  Ext: all four extremities are warm  Skin: no rashes noted  Neuro: CN II-XII intact    Current Weight: Weight - Scale: 65 2 kg (143 lb 11 8 oz)  First Weight: Weight - Scale: 64 8 kg (142 lb 13 7 oz)    Lab Results:  I have personally reviewed pertinent labs      CBC: No results found for: WBC, HGB, HCT, MCV, PLT, ADJUSTEDWBC, MCH, MCHC, RDW, MPV, NRBC  CMP:   Lab Results   Component Value Date    K 3 1 (L) 09/18/2020     (H) 09/18/2020    CO2 17 (L) 09/18/2020    BUN 7 09/18/2020    CREATININE 0 86 09/18/2020    CALCIUM 7 9 (L) 09/18/2020    EGFR 87 09/18/2020     Phosphorus: No results found for: PHOS  Magnesium: No results found for: MG  Urinalysis: No results found for: [address] [facility], LEUKOCYTESUR, NITRITE, PROTEINUA, Brea Goldberg, BILIRUBINUR, BLOODU  Ionized Calcium: No results found for: CAION  Coagulation: No results found for: PT, INR, APTT  Troponin: No results found for: TROPONINI  ABG: No results found for: PHART, OHM5RGA, PO2ART, WWY4IAR, I2CQATXN, BEART, SOURCE    Results from last 7 days   Lab Units 09/18/20  0609 09/17/20  2135 09/17/20  0845  09/16/20  1904   POTASSIUM mmol/L 3 1* 2 7* 3 7   < > 2 4*   CHLORIDE mmol/L 109* 108 111*   < > 108   CO2 mmol/L 17* 16* 19*   < > 19*   BUN mg/dL 7 9 11   < > 11   CREATININE mg/dL 0 86 0 94 0 93   < > 0 99   CALCIUM mg/dL 7 9* 7 8* 7 8*   < > 8 2*   ALK PHOS U/L  --   --   --   --  55   ALT U/L  --   --   --   --  29   AST U/L  --   --   --   --  20    < > = values in this interval not displayed  Radiology review:  No results found  Cathi Ureña DO      This consultation note was produced in part using a dictation device which may document imprecise wording from author's original intent

## 2020-09-18 NOTE — CONSULTS
Consultation - GI   Ean Hernandez 38 y o  female MRN: [id number]  Unit/Bed#: -01 Encounter: [id number]      Assessment/Plan     Assessment:    Severe post prandial pain leading to decreased po intake leading to hypokalemia, s/p RYGBP   Negative workup to date - (reportedly normal) EGD recently at outside hospital, negative laparoscopy, unremarkable non-IV contrast CT scan, poor prep but otherwise unremarkable colonoscopy    Remaining DDx:  Chronic pancreatitis, mesenteric ischemia, sma syndrome    Plan:    1  Dedicated MRI of pancreas with IV contrast  2  Mesenteric dopplers  3  If both negative t/c repeating EGD (patient states this was done along with laparoscopy?)    History of Present Illness   Physician Requesting Consult: Law Bishop *  Reason for Consult / Principal Problem: left sided pain  Hx and PE limited by:   HPI: Ean Hernandez is a 38y o  year old female who presents with recent onset of left sided post prandial pain  Described as being combination of cramping (which is continuous) and sharp (postprandial)  The location is just to the left of umbilicus and LUQ  At baseline she takes oral potassium suppl for hypokalemia presumably secondary to the gastric bypass  But because she has not been able to eat due to the postprandial pain she now has persistent hypokalemia requiring IV supplementation  Lipase was normal on admission  Denies any history of personal or family h/o pancreatic issues  No h/o pancreatitis  Only drinks beer once a month at the most   No h/o alcoholism    She is s/p cholecystectomy and her LFTs are normal       Consults    Review of Systems    Historical Information   Past Medical History:   Diagnosis Date    H  pylori infection     Hypokalemia     Migraine     Renal disorder     Rhabdomyolysis      Past Surgical History:   Procedure Laterality Date    ABDOMINAL SURGERY      APPENDECTOMY       SECTION      CHOLECYSTECTOMY      COSMETIC SURGERY      "tummy tuck"    FL GUIDED CENTRAL VENOUS ACCESS DEVICE INSERTION  12/21/2018    GASTRIC BYPASS      HYSTERECTOMY      LAPAROSCOPY      TUNNELED VENOUS PORT PLACEMENT N/A 12/21/2018    Procedure: INSERTION VENOUS PORT (PORT-A-CATH); Surgeon: Abbi Betanocurt DO;  Location: [facility];  Service: General     Social History   Social History     Substance and Sexual Activity   Alcohol Use Never    Alcohol/week: 0 0 standard drinks    Frequency: Never    Drinks per session: Patient refused    Binge frequency: Never    Comment: 0     Social History     Substance and Sexual Activity   Drug Use Never     E-Cigarette/Vaping    E-Cigarette Use Never User      E-Cigarette/Vaping Substances    Nicotine No     THC No     CBD No      Social History     Tobacco Use   Smoking Status Never Smoker   Smokeless Tobacco Never Used     Family History: non-contributory    Meds/Allergies   all current active meds have been reviewed    Allergies   Allergen Reactions    Nsaids Other (See Comments)     Other reaction(s): Other (Please comment)  Should not take s/p bariatric surgery  Other reaction(s): Other (See Comments)  Should not take as per prior records  Should not take s/p bariatric surgery  Has hx of gastric bypass      Prednisone      Nausea, vomiting, diarrhea       Objective       Intake/Output Summary (Last 24 hours) at 9/18/2020 1300  Last data filed at 9/18/2020 1125  Gross per 24 hour   Intake 1326 25 ml   Output    Net 1326 25 ml       Invasive Devices:   Port A Cath 12/22/18 Right Chest (Active)   Access Date 09/17/20 09/17/20 1956   Access Time 1956 09/17/20 1956   Accessed by: Agnieszka Rico 09/17/20 1956   Size (Gauge) 20 G 09/17/20 1956   Needle Length  1 inch 09/17/20 1956   Reasons to continue [employer] require central line; No peripheral access 09/17/20 1956   Goal for Removal No longer needed-will place order to deaccess 09/17/20 1956   Site Assessment Clean;Dry; Intact 09/18/20 4266 Line Status Infusing 09/18/20 0619   Dressing Type Transparent 09/18/20 0619   Dressing Status Clean;Dry; Intact 09/18/20 6355   Dressing Intervention 7 day central line dressing changed 09/17/20 1956   Port re-access due 09/24/20 09/18/20 0619   Flush Performed Yes 09/18/20 9343   De-Accessed Date 09/17/20 09/17/20 1945   De-Accessed Time 1945 09/17/20 1945   De-accessed by: Po Cid RN 09/17/20 1945       [facility] Central Lines 09/16/20 (Active)       Physical Exam    Lab Results: I have personally reviewed pertinent reports  Imaging Studies: I have personally reviewed pertinent reports  EKG, Pathology, and Other Studies: I have personally reviewed pertinent reports  VTE Prophylaxis:     Counseling / Coordination of Care  Total floor / unit time spent today 30 minutes  Greater than 50% of total time was spent with the patient and / or family counseling and / or coordination of care   A description of the counseling / coordination of care:

## 2020-09-18 NOTE — PROGRESS NOTES
Pt reports LLQ pain and food intolerance, only tolerating clear liquids recently  Today reports vomiting after eating chicken soup for B  On IV and oral potassium  Will trial ensure enlive daily, increase as tolerated/necessary

## 2020-09-18 NOTE — PLAN OF CARE
Problem: Potential for Falls  Goal: Patient will remain free of falls  Description: INTERVENTIONS:  - Assess patient frequently for physical needs  -  Identify cognitive and physical deficits and behaviors that affect risk of falls  -  [place] fall precautions as indicated by assessment   - Educate patient/family on patient safety including physical limitations  - Instruct patient to call for assistance with activity based on assessment  - Modify environment to reduce risk of injury  - Consider OT/PT consult to assist with strengthening/mobility  Outcome: Progressing     Problem: Nutrition/Hydration-ADULT  Goal: Nutrient/Hydration intake appropriate for improving, restoring or maintaining nutritional needs  Description: Monitor and assess patient's nutrition/hydration status for malnutrition  Collaborate with interdisciplinary team and initiate plan and interventions as ordered  Monitor patient's weight and dietary intake as ordered or per policy  Utilize nutrition screening tool and intervene as necessary  Determine patient's food preferences and provide high-protein, high-caloric foods as appropriate       INTERVENTIONS:  - Monitor oral intake, urinary output, labs, and treatment plans  - Assess nutrition and hydration status and recommend course of action  - Evaluate amount of meals eaten  - Assist patient with eating if necessary   - Allow adequate time for meals  - Recommend/ encourage appropriate diets, oral nutritional supplements, and vitamin/mineral supplements  - Order, calculate, and assess calorie counts as needed  - Recommend, monitor, and adjust tube feedings and TPN/PPN based on assessed needs  - Assess need for intravenous fluids  - Provide specific nutrition/hydration education as appropriate  - Include patient/family/caregiver in decisions related to nutrition  Outcome: Progressing     Problem: PAIN - ADULT  Goal: Verbalizes/displays adequate comfort level or baseline comfort level  Description: Interventions:  - Encourage patient to monitor pain and request assistance  - Assess pain using appropriate pain scale  - Administer analgesics based on type and severity of pain and evaluate response  - Implement non-pharmacological measures as appropriate and evaluate response  - Consider cultural and social influences on pain and pain management  - Notify physician/advanced practitioner if interventions unsuccessful or patient reports new pain  Outcome: Progressing     Problem: INFECTION - ADULT  Goal: Absence or prevention of progression during hospitalization  Description: INTERVENTIONS:  - Assess and monitor for signs and symptoms of infection  - Monitor lab/diagnostic results  - Monitor all insertion sites, i e  indwelling lines, tubes, and drains  - Monitor endotracheal if appropriate and nasal secretions for changes in amount and color  - [place] appropriate cooling/warming therapies per order  - Administer medications as ordered  - Instruct and encourage patient and family to use good hand hygiene technique  - Identify and instruct in appropriate isolation precautions for identified infection/condition  Outcome: Progressing     Problem: SAFETY ADULT  Goal: Patient will remain free of falls  Description: INTERVENTIONS:  - Assess patient frequently for physical needs  -  Identify cognitive and physical deficits and behaviors that affect risk of falls    -  [place] fall precautions as indicated by assessment   - Educate patient/family on patient safety including physical limitations  - Instruct patient to call for assistance with activity based on assessment  - Modify environment to reduce risk of injury  - Consider OT/PT consult to assist with strengthening/mobility  Outcome: Progressing  Goal: Maintain or return to baseline ADL function  Description: INTERVENTIONS:  -  Assess patient's ability to carry out ADLs; assess patient's baseline for ADL function and identify physical deficits which impact ability to perform ADLs (bathing, care of mouth/teeth, toileting, grooming, dressing, etc )  - Assess/evaluate cause of self-care deficits   - Assess range of motion  - Assess patient's mobility; develop plan if impaired  - Assess patient's need for assistive devices and provide as appropriate  - Encourage maximum independence but intervene and supervise when necessary  - Involve family in performance of ADLs  - Assess for home care needs following discharge   - Consider OT consult to assist with ADL evaluation and planning for discharge  - Provide patient education as appropriate  Outcome: Progressing  Goal: Maintain or return mobility status to optimal level  Description: INTERVENTIONS:  - Assess patient's baseline mobility status (ambulation, transfers, stairs, etc )    - Identify cognitive and physical deficits and behaviors that affect mobility  - Identify mobility aids required to assist with transfers and/or ambulation (gait belt, sit-to-stand, lift, walker, cane, etc )  - [place] fall precautions as indicated by assessment  - Record patient progress and toleration of activity level on Mobility SBAR; progress patient to next Phase/Stage  - Instruct patient to call for assistance with activity based on assessment  - Consider rehabilitation consult to assist with strengthening/weightbearing, etc   Outcome: Progressing     Problem: DISCHARGE PLANNING  Goal: Discharge to home or other facility with appropriate resources  Description: INTERVENTIONS:  - Identify barriers to discharge w/patient and caregiver  - Arrange for needed discharge resources and transportation as appropriate  - Identify discharge learning needs (meds, wound care, etc )  - Arrange for interpretive services to assist at discharge as needed  - Refer to Case Management Department for coordinating discharge planning if the patient needs post-hospital services based on physician/advanced practitioner order or complex needs related to functional status, cognitive ability, or social support system  Outcome: Progressing     Problem: Knowledge Deficit  Goal: Patient/family/caregiver demonstrates understanding of disease process, treatment plan, medications, and discharge instructions  Description: Complete learning assessment and assess knowledge base    Interventions:  - Provide teaching at level of understanding  - Provide teaching via preferred learning methods  Outcome: Progressing     Problem: GASTROINTESTINAL - ADULT  Goal: Minimal or absence of nausea and/or vomiting  Description: INTERVENTIONS:  - Administer IV fluids if ordered to ensure adequate hydration  - Maintain NPO status until nausea and vomiting are resolved  - Nasogastric tube if ordered  - Administer ordered antiemetic medications as needed  - Provide nonpharmacologic comfort measures as appropriate  - Advance diet as tolerated, if ordered  - Consider nutrition services referral to assist patient with adequate nutrition and appropriate food choices  Outcome: Progressing  Goal: Maintains or returns to baseline bowel function  Description: INTERVENTIONS:  - Assess bowel function  - Encourage oral fluids to ensure adequate hydration  - Administer IV fluids if ordered to ensure adequate hydration  - Administer ordered medications as needed  - Encourage mobilization and activity  - Consider nutritional services referral to assist patient with adequate nutrition and appropriate food choices  Outcome: Progressing  Goal: Maintains adequate nutritional intake  Description: INTERVENTIONS:  - Monitor percentage of each meal consumed  - Identify factors contributing to decreased intake, treat as appropriate  - Assist with meals as needed  - Monitor I&O, weight, and lab values if indicated  - Obtain nutrition services referral as needed  Outcome: Progressing     Problem: METABOLIC, FLUID AND ELECTROLYTES - ADULT  Goal: Electrolytes maintained within normal limits  Description: INTERVENTIONS:  - Monitor labs and assess patient for signs and symptoms of electrolyte imbalances  - Administer electrolyte replacement as ordered  - Monitor response to electrolyte replacements, including repeat lab results as appropriate  - Instruct patient on fluid and nutrition as appropriate  Outcome: Progressing  Goal: Fluid balance maintained  Description: INTERVENTIONS:  - Monitor labs   - Monitor I/O and WT  - Instruct patient on fluid and nutrition as appropriate  - Assess for signs & symptoms of volume excess or deficit  Outcome: Progressing

## 2020-09-18 NOTE — PLAN OF CARE
Problem: Potential for Falls  Goal: Patient will remain free of falls  Description: INTERVENTIONS:  - Assess patient frequently for physical needs  -  Identify cognitive and physical deficits and behaviors that affect risk of falls  -  [place] fall precautions as indicated by assessment   - Educate patient/family on patient safety including physical limitations  - Instruct patient to call for assistance with activity based on assessment  - Modify environment to reduce risk of injury  - Consider OT/PT consult to assist with strengthening/mobility  Outcome: Progressing     Problem: Nutrition/Hydration-ADULT  Goal: Nutrient/Hydration intake appropriate for improving, restoring or maintaining nutritional needs  Description: Monitor and assess patient's nutrition/hydration status for malnutrition  Collaborate with interdisciplinary team and initiate plan and interventions as ordered  Monitor patient's weight and dietary intake as ordered or per policy  Utilize nutrition screening tool and intervene as necessary  Determine patient's food preferences and provide high-protein, high-caloric foods as appropriate       INTERVENTIONS:  - Monitor oral intake, urinary output, labs, and treatment plans  - Assess nutrition and hydration status and recommend course of action  - Evaluate amount of meals eaten  - Assist patient with eating if necessary   - Allow adequate time for meals  - Recommend/ encourage appropriate diets, oral nutritional supplements, and vitamin/mineral supplements  - Order, calculate, and assess calorie counts as needed  - Recommend, monitor, and adjust tube feedings and TPN/PPN based on assessed needs  - Assess need for intravenous fluids  - Provide specific nutrition/hydration education as appropriate  - Include patient/family/caregiver in decisions related to nutrition  Outcome: Progressing     Problem: PAIN - ADULT  Goal: Verbalizes/displays adequate comfort level or baseline comfort level  Description: Interventions:  - Encourage patient to monitor pain and request assistance  - Assess pain using appropriate pain scale  - Administer analgesics based on type and severity of pain and evaluate response  - Implement non-pharmacological measures as appropriate and evaluate response  - Consider cultural and social influences on pain and pain management  - Notify physician/advanced practitioner if interventions unsuccessful or patient reports new pain  Outcome: Progressing     Problem: INFECTION - ADULT  Goal: Absence or prevention of progression during hospitalization  Description: INTERVENTIONS:  - Assess and monitor for signs and symptoms of infection  - Monitor lab/diagnostic results  - Monitor all insertion sites, i e  indwelling lines, tubes, and drains  - Monitor endotracheal if appropriate and nasal secretions for changes in amount and color  - [place] appropriate cooling/warming therapies per order  - Administer medications as ordered  - Instruct and encourage patient and family to use good hand hygiene technique  - Identify and instruct in appropriate isolation precautions for identified infection/condition  Outcome: Progressing  Goal: Absence of fever/infection during neutropenic period  Description: INTERVENTIONS:  - Monitor WBC    Outcome: Progressing     Problem: SAFETY ADULT  Goal: Patient will remain free of falls  Description: INTERVENTIONS:  - Assess patient frequently for physical needs  -  Identify cognitive and physical deficits and behaviors that affect risk of falls    -  [place] fall precautions as indicated by assessment   - Educate patient/family on patient safety including physical limitations  - Instruct patient to call for assistance with activity based on assessment  - Modify environment to reduce risk of injury  - Consider OT/PT consult to assist with strengthening/mobility  Outcome: Progressing  Goal: Maintain or return to baseline ADL function  Description: INTERVENTIONS:  -  Assess patient's ability to carry out ADLs; assess patient's baseline for ADL function and identify physical deficits which impact ability to perform ADLs (bathing, care of mouth/teeth, toileting, grooming, dressing, etc )  - Assess/evaluate cause of self-care deficits   - Assess range of motion  - Assess patient's mobility; develop plan if impaired  - Assess patient's need for assistive devices and provide as appropriate  - Encourage maximum independence but intervene and supervise when necessary  - Involve family in performance of ADLs  - Assess for home care needs following discharge   - Consider OT consult to assist with ADL evaluation and planning for discharge  - Provide patient education as appropriate  Outcome: Progressing  Goal: Maintain or return mobility status to optimal level  Description: INTERVENTIONS:  - Assess patient's baseline mobility status (ambulation, transfers, stairs, etc )    - Identify cognitive and physical deficits and behaviors that affect mobility  - Identify mobility aids required to assist with transfers and/or ambulation (gait belt, sit-to-stand, lift, walker, cane, etc )  - [place] fall precautions as indicated by assessment  - Record patient progress and toleration of activity level on Mobility SBAR; progress patient to next Phase/Stage  - Instruct patient to call for assistance with activity based on assessment  - Consider rehabilitation consult to assist with strengthening/weightbearing, etc   Outcome: Progressing     Problem: DISCHARGE PLANNING  Goal: Discharge to home or other facility with appropriate resources  Description: INTERVENTIONS:  - Identify barriers to discharge w/patient and caregiver  - Arrange for needed discharge resources and transportation as appropriate  - Identify discharge learning needs (meds, wound care, etc )  - Arrange for interpretive services to assist at discharge as needed  - Refer to Case Management Department for coordinating discharge planning if the patient needs post-hospital services based on physician/advanced practitioner order or complex needs related to functional status, cognitive ability, or social support system  Outcome: Progressing     Problem: Knowledge Deficit  Goal: Patient/family/caregiver demonstrates understanding of disease process, treatment plan, medications, and discharge instructions  Description: Complete learning assessment and assess knowledge base    Interventions:  - Provide teaching at level of understanding  - Provide teaching via preferred learning methods  Outcome: Progressing     Problem: GASTROINTESTINAL - ADULT  Goal: Minimal or absence of nausea and/or vomiting  Description: INTERVENTIONS:  - Administer IV fluids if ordered to ensure adequate hydration  - Maintain NPO status until nausea and vomiting are resolved  - Nasogastric tube if ordered  - Administer ordered antiemetic medications as needed  - Provide nonpharmacologic comfort measures as appropriate  - Advance diet as tolerated, if ordered  - Consider nutrition services referral to assist patient with adequate nutrition and appropriate food choices  Outcome: Progressing  Goal: Maintains or returns to baseline bowel function  Description: INTERVENTIONS:  - Assess bowel function  - Encourage oral fluids to ensure adequate hydration  - Administer IV fluids if ordered to ensure adequate hydration  - Administer ordered medications as needed  - Encourage mobilization and activity  - Consider nutritional services referral to assist patient with adequate nutrition and appropriate food choices  Outcome: Progressing  Goal: Maintains adequate nutritional intake  Description: INTERVENTIONS:  - Monitor percentage of each meal consumed  - Identify factors contributing to decreased intake, treat as appropriate  - Assist with meals as needed  - Monitor I&O, weight, and lab values if indicated  - Obtain nutrition services referral as needed  Outcome: Progressing     Problem: METABOLIC, FLUID AND ELECTROLYTES - ADULT  Goal: Electrolytes maintained within normal limits  Description: INTERVENTIONS:  - Monitor labs and assess patient for signs and symptoms of electrolyte imbalances  - Administer electrolyte replacement as ordered  - Monitor response to electrolyte replacements, including repeat lab results as appropriate  - Instruct patient on fluid and nutrition as appropriate  Outcome: Progressing  Goal: Fluid balance maintained  Description: INTERVENTIONS:  - Monitor labs   - Monitor I/O and WT  - Instruct patient on fluid and nutrition as appropriate  - Assess for signs & symptoms of volume excess or deficit  Outcome: Progressing

## 2020-09-18 NOTE — ASSESSMENT & PLAN NOTE
· Currently controlled  · Discontinued topiramate as this may contribute to metabolic abnormalities  · Patient may opt to take sumatriptan to abort any migraines that should occur

## 2020-09-18 NOTE — PLAN OF CARE
Problem: Potential for Falls  Goal: Patient will remain free of falls  Description: INTERVENTIONS:  - Assess patient frequently for physical needs  -  Identify cognitive and physical deficits and behaviors that affect risk of falls  -  [place] fall precautions as indicated by assessment   - Educate patient/family on patient safety including physical limitations  - Instruct patient to call for assistance with activity based on assessment  - Modify environment to reduce risk of injury  - Consider OT/PT consult to assist with strengthening/mobility  Outcome: Progressing     Problem: Nutrition/Hydration-ADULT  Goal: Nutrient/Hydration intake appropriate for improving, restoring or maintaining nutritional needs  Description: Monitor and assess patient's nutrition/hydration status for malnutrition  Collaborate with interdisciplinary team and initiate plan and interventions as ordered  Monitor patient's weight and dietary intake as ordered or per policy  Utilize nutrition screening tool and intervene as necessary  Determine patient's food preferences and provide high-protein, high-caloric foods as appropriate       INTERVENTIONS:  - Monitor oral intake, urinary output, labs, and treatment plans  - Assess nutrition and hydration status and recommend course of action  - Evaluate amount of meals eaten  - Assist patient with eating if necessary   - Allow adequate time for meals  - Recommend/ encourage appropriate diets, oral nutritional supplements, and vitamin/mineral supplements  - Order, calculate, and assess calorie counts as needed  - Recommend, monitor, and adjust tube feedings and TPN/PPN based on assessed needs  - Assess need for intravenous fluids  - Provide specific nutrition/hydration education as appropriate  - Include patient/family/caregiver in decisions related to nutrition  Outcome: Progressing     Problem: PAIN - ADULT  Goal: Verbalizes/displays adequate comfort level or baseline comfort level  Description: Interventions:  - Encourage patient to monitor pain and request assistance  - Assess pain using appropriate pain scale  - Administer analgesics based on type and severity of pain and evaluate response  - Implement non-pharmacological measures as appropriate and evaluate response  - Consider cultural and social influences on pain and pain management  - Notify physician/advanced practitioner if interventions unsuccessful or patient reports new pain  Outcome: Progressing     Problem: INFECTION - ADULT  Goal: Absence or prevention of progression during hospitalization  Description: INTERVENTIONS:  - Assess and monitor for signs and symptoms of infection  - Monitor lab/diagnostic results  - Monitor all insertion sites, i e  indwelling lines, tubes, and drains  - Monitor endotracheal if appropriate and nasal secretions for changes in amount and color  - [place] appropriate cooling/warming therapies per order  - Administer medications as ordered  - Instruct and encourage patient and family to use good hand hygiene technique  - Identify and instruct in appropriate isolation precautions for identified infection/condition  Outcome: Progressing     Problem: SAFETY ADULT  Goal: Patient will remain free of falls  Description: INTERVENTIONS:  - Assess patient frequently for physical needs  -  Identify cognitive and physical deficits and behaviors that affect risk of falls    -  [place] fall precautions as indicated by assessment   - Educate patient/family on patient safety including physical limitations  - Instruct patient to call for assistance with activity based on assessment  - Modify environment to reduce risk of injury  - Consider OT/PT consult to assist with strengthening/mobility  Outcome: Progressing  Goal: Maintain or return to baseline ADL function  Description: INTERVENTIONS:  -  Assess patient's ability to carry out ADLs; assess patient's baseline for ADL function and identify physical deficits which impact ability to perform ADLs (bathing, care of mouth/teeth, toileting, grooming, dressing, etc )  - Assess/evaluate cause of self-care deficits   - Assess range of motion  - Assess patient's mobility; develop plan if impaired  - Assess patient's need for assistive devices and provide as appropriate  - Encourage maximum independence but intervene and supervise when necessary  - Involve family in performance of ADLs  - Assess for home care needs following discharge   - Consider OT consult to assist with ADL evaluation and planning for discharge  - Provide patient education as appropriate  Outcome: Progressing  Goal: Maintain or return mobility status to optimal level  Description: INTERVENTIONS:  - Assess patient's baseline mobility status (ambulation, transfers, stairs, etc )    - Identify cognitive and physical deficits and behaviors that affect mobility  - Identify mobility aids required to assist with transfers and/or ambulation (gait belt, sit-to-stand, lift, walker, cane, etc )  - [place] fall precautions as indicated by assessment  - Record patient progress and toleration of activity level on Mobility SBAR; progress patient to next Phase/Stage  - Instruct patient to call for assistance with activity based on assessment  - Consider rehabilitation consult to assist with strengthening/weightbearing, etc   Outcome: Progressing     Problem: DISCHARGE PLANNING  Goal: Discharge to home or other facility with appropriate resources  Description: INTERVENTIONS:  - Identify barriers to discharge w/patient and caregiver  - Arrange for needed discharge resources and transportation as appropriate  - Identify discharge learning needs (meds, wound care, etc )  - Arrange for interpretive services to assist at discharge as needed  - Refer to Case Management Department for coordinating discharge planning if the patient needs post-hospital services based on physician/advanced practitioner order or complex needs related to functional status, cognitive ability, or social support system  Outcome: Progressing     Problem: Knowledge Deficit  Goal: Patient/family/caregiver demonstrates understanding of disease process, treatment plan, medications, and discharge instructions  Description: Complete learning assessment and assess knowledge base    Interventions:  - Provide teaching at level of understanding  - Provide teaching via preferred learning methods  Outcome: Progressing     Problem: GASTROINTESTINAL - ADULT  Goal: Minimal or absence of nausea and/or vomiting  Description: INTERVENTIONS:  - Administer IV fluids if ordered to ensure adequate hydration  - Maintain NPO status until nausea and vomiting are resolved  - Nasogastric tube if ordered  - Administer ordered antiemetic medications as needed  - Provide nonpharmacologic comfort measures as appropriate  - Advance diet as tolerated, if ordered  - Consider nutrition services referral to assist patient with adequate nutrition and appropriate food choices  Outcome: Progressing  Goal: Maintains or returns to baseline bowel function  Description: INTERVENTIONS:  - Assess bowel function  - Encourage oral fluids to ensure adequate hydration  - Administer IV fluids if ordered to ensure adequate hydration  - Administer ordered medications as needed  - Encourage mobilization and activity  - Consider nutritional services referral to assist patient with adequate nutrition and appropriate food choices  Outcome: Progressing  Goal: Maintains adequate nutritional intake  Description: INTERVENTIONS:  - Monitor percentage of each meal consumed  - Identify factors contributing to decreased intake, treat as appropriate  - Assist with meals as needed  - Monitor I&O, weight, and lab values if indicated  - Obtain nutrition services referral as needed  Outcome: Progressing     Problem: METABOLIC, FLUID AND ELECTROLYTES - ADULT  Goal: Electrolytes maintained within normal limits  Description: INTERVENTIONS:  - Monitor labs and assess patient for signs and symptoms of electrolyte imbalances  - Administer electrolyte replacement as ordered  - Monitor response to electrolyte replacements, including repeat lab results as appropriate  - Instruct patient on fluid and nutrition as appropriate  Outcome: Progressing  Goal: Fluid balance maintained  Description: INTERVENTIONS:  - Monitor labs   - Monitor I/O and WT  - Instruct patient on fluid and nutrition as appropriate  - Assess for signs & symptoms of volume excess or deficit  Outcome: Progressing

## 2020-09-18 NOTE — SOCIAL WORK
CM placed a [facility] in [place] for [facility], as pt is current with their services      Pt sts she will schedule her own PCP follow up appointment

## 2020-09-19 LAB
ALBUMIN SERPL BCP-MCNC: 2.9 G/DL (ref 3.5–5)
ALP SERPL-CCNC: 50 U/L (ref 46–116)
ALT SERPL W P-5'-P-CCNC: 24 U/L (ref 12–78)
ANION GAP SERPL CALCULATED.3IONS-SCNC: 12 MMOL/L (ref 4–13)
ANION GAP SERPL CALCULATED.3IONS-SCNC: 12 MMOL/L (ref 4–13)
ANION GAP SERPL CALCULATED.3IONS-SCNC: 14 MMOL/L (ref 4–13)
AST SERPL W P-5'-P-CCNC: 17 U/L (ref 5–45)
ATRIAL RATE: 83 BPM
BASOPHILS # BLD AUTO: 0.03 THOUSANDS/ΜL (ref 0–0.1)
BASOPHILS NFR BLD AUTO: 1 % (ref 0–1)
BILIRUB SERPL-MCNC: 0.11 MG/DL (ref 0.2–1)
BUN SERPL-MCNC: 6 MG/DL (ref 5–25)
BUN SERPL-MCNC: 6 MG/DL (ref 5–25)
BUN SERPL-MCNC: 8 MG/DL (ref 5–25)
CALCIUM ALBUM COR SERPL-MCNC: 8.7 MG/DL (ref 8.3–10.1)
CALCIUM SERPL-MCNC: 7.8 MG/DL (ref 8.3–10.1)
CALCIUM SERPL-MCNC: 7.9 MG/DL (ref 8.3–10.1)
CALCIUM SERPL-MCNC: 8 MG/DL (ref 8.3–10.1)
CHLORIDE SERPL-SCNC: 104 MMOL/L (ref 100–108)
CHLORIDE SERPL-SCNC: 107 MMOL/L (ref 100–108)
CHLORIDE SERPL-SCNC: 108 MMOL/L (ref 100–108)
CO2 SERPL-SCNC: 17 MMOL/L (ref 21–32)
CO2 SERPL-SCNC: 18 MMOL/L (ref 21–32)
CO2 SERPL-SCNC: 19 MMOL/L (ref 21–32)
CREAT SERPL-MCNC: 0.84 MG/DL (ref 0.6–1.3)
CREAT SERPL-MCNC: 0.88 MG/DL (ref 0.6–1.3)
CREAT SERPL-MCNC: 0.89 MG/DL (ref 0.6–1.3)
EOSINOPHIL # BLD AUTO: 0.1 THOUSAND/ΜL (ref 0–0.61)
EOSINOPHIL NFR BLD AUTO: 3 % (ref 0–6)
ERYTHROCYTE [DISTWIDTH] IN BLOOD BY AUTOMATED COUNT: 17.9 % (ref 11.6–15.1)
GFR SERPL CREATININE-BSD FRML MDRD: 84 ML/MIN/1.73SQ M
GFR SERPL CREATININE-BSD FRML MDRD: 85 ML/MIN/1.73SQ M
GFR SERPL CREATININE-BSD FRML MDRD: 90 ML/MIN/1.73SQ M
GLUCOSE SERPL-MCNC: 109 MG/DL (ref 65–140)
GLUCOSE SERPL-MCNC: 79 MG/DL (ref 65–140)
GLUCOSE SERPL-MCNC: 93 MG/DL (ref 65–140)
HCT VFR BLD AUTO: 34.9 % (ref 34.8–46.1)
HGB BLD-MCNC: 10.9 G/DL (ref 11.5–15.4)
IMM GRANULOCYTES # BLD AUTO: 0.01 THOUSAND/UL (ref 0–0.2)
IMM GRANULOCYTES NFR BLD AUTO: 0 % (ref 0–2)
LYMPHOCYTES # BLD AUTO: 1.75 THOUSANDS/ΜL (ref 0.6–4.47)
LYMPHOCYTES NFR BLD AUTO: 46 % (ref 14–44)
MAGNESIUM SERPL-MCNC: 1.9 MG/DL (ref 1.6–2.6)
MCH RBC QN AUTO: 27.1 PG (ref 26.8–34.3)
MCHC RBC AUTO-ENTMCNC: 31.2 G/DL (ref 31.4–37.4)
MCV RBC AUTO: 87 FL (ref 82–98)
MONOCYTES # BLD AUTO: 0.32 THOUSAND/ΜL (ref 0.17–1.22)
MONOCYTES NFR BLD AUTO: 9 % (ref 4–12)
NEUTROPHILS # BLD AUTO: 1.53 THOUSANDS/ΜL (ref 1.85–7.62)
NEUTS SEG NFR BLD AUTO: 41 % (ref 43–75)
NRBC BLD AUTO-RTO: 0 /100 WBCS
P AXIS: 61 DEGREES
PHOSPHATE SERPL-MCNC: 3.2 MG/DL (ref 2.7–4.5)
PLATELET # BLD AUTO: 263 THOUSANDS/UL (ref 149–390)
PMV BLD AUTO: 10.4 FL (ref 8.9–12.7)
POTASSIUM SERPL-SCNC: 3.1 MMOL/L (ref 3.5–5.3)
POTASSIUM SERPL-SCNC: 3.3 MMOL/L (ref 3.5–5.3)
POTASSIUM SERPL-SCNC: 3.4 MMOL/L (ref 3.5–5.3)
PR INTERVAL: 128 MS
PROT SERPL-MCNC: 6 G/DL (ref 6.4–8.2)
QRS AXIS: 50 DEGREES
QRSD INTERVAL: 100 MS
QT INTERVAL: 356 MS
QTC INTERVAL: 418 MS
RBC # BLD AUTO: 4.02 MILLION/UL (ref 3.81–5.12)
SODIUM SERPL-SCNC: 135 MMOL/L (ref 136–145)
SODIUM SERPL-SCNC: 137 MMOL/L (ref 136–145)
SODIUM SERPL-SCNC: 139 MMOL/L (ref 136–145)
T WAVE AXIS: 7 DEGREES
VENTRICULAR RATE: 83 BPM
WBC # BLD AUTO: 3.74 THOUSAND/UL (ref 4.31–10.16)

## 2020-09-19 PROCEDURE — 93010 ELECTROCARDIOGRAM REPORT: CPT | Performed by: INTERNAL MEDICINE

## 2020-09-19 PROCEDURE — 99232 SBSQ HOSP IP/OBS MODERATE 35: CPT | Performed by: INTERNAL MEDICINE

## 2020-09-19 PROCEDURE — 84100 ASSAY OF PHOSPHORUS: CPT | Performed by: STUDENT IN AN ORGANIZED HEALTH CARE EDUCATION/TRAINING PROGRAM

## 2020-09-19 PROCEDURE — 80048 BASIC METABOLIC PNL TOTAL CA: CPT | Performed by: STUDENT IN AN ORGANIZED HEALTH CARE EDUCATION/TRAINING PROGRAM

## 2020-09-19 PROCEDURE — 85025 COMPLETE CBC W/AUTO DIFF WBC: CPT | Performed by: STUDENT IN AN ORGANIZED HEALTH CARE EDUCATION/TRAINING PROGRAM

## 2020-09-19 PROCEDURE — 83735 ASSAY OF MAGNESIUM: CPT | Performed by: STUDENT IN AN ORGANIZED HEALTH CARE EDUCATION/TRAINING PROGRAM

## 2020-09-19 PROCEDURE — 80053 COMPREHEN METABOLIC PANEL: CPT | Performed by: STUDENT IN AN ORGANIZED HEALTH CARE EDUCATION/TRAINING PROGRAM

## 2020-09-19 PROCEDURE — 99232 SBSQ HOSP IP/OBS MODERATE 35: CPT | Performed by: STUDENT IN AN ORGANIZED HEALTH CARE EDUCATION/TRAINING PROGRAM

## 2020-09-19 RX ORDER — POTASSIUM CHLORIDE 14.9 MG/ML
20 INJECTION INTRAVENOUS ONCE
Status: COMPLETED | OUTPATIENT
Start: 2020-09-19 | End: 2020-09-19

## 2020-09-19 RX ORDER — POTASSIUM CHLORIDE 14.9 MG/ML
20 INJECTION INTRAVENOUS 2 TIMES DAILY
Status: DISCONTINUED | OUTPATIENT
Start: 2020-09-19 | End: 2020-09-19

## 2020-09-19 RX ORDER — HYDROMORPHONE HCL/PF 1 MG/ML
0.2 SYRINGE (ML) INJECTION EVERY 4 HOURS PRN
Status: DISCONTINUED | OUTPATIENT
Start: 2020-09-19 | End: 2020-09-20 | Stop reason: HOSPADM

## 2020-09-19 RX ADMIN — CYCLOBENZAPRINE HYDROCHLORIDE 10 MG: 10 TABLET, FILM COATED ORAL at 22:06

## 2020-09-19 RX ADMIN — HYDROMORPHONE HYDROCHLORIDE 0.5 MG: 1 INJECTION, SOLUTION INTRAMUSCULAR; INTRAVENOUS; SUBCUTANEOUS at 17:30

## 2020-09-19 RX ADMIN — AMITRIPTYLINE HYDROCHLORIDE 50 MG: 25 TABLET, FILM COATED ORAL at 22:06

## 2020-09-19 RX ADMIN — ONDANSETRON 4 MG: 2 INJECTION INTRAMUSCULAR; INTRAVENOUS at 17:15

## 2020-09-19 RX ADMIN — HYDROMORPHONE HYDROCHLORIDE 0.2 MG: 1 INJECTION, SOLUTION INTRAMUSCULAR; INTRAVENOUS; SUBCUTANEOUS at 21:00

## 2020-09-19 RX ADMIN — ONDANSETRON 4 MG: 2 INJECTION INTRAMUSCULAR; INTRAVENOUS at 08:05

## 2020-09-19 RX ADMIN — POTASSIUM CHLORIDE 20 MEQ: 14.9 INJECTION, SOLUTION INTRAVENOUS at 10:59

## 2020-09-19 RX ADMIN — CYCLOBENZAPRINE HYDROCHLORIDE 10 MG: 10 TABLET, FILM COATED ORAL at 16:05

## 2020-09-19 RX ADMIN — CYCLOBENZAPRINE HYDROCHLORIDE 10 MG: 10 TABLET, FILM COATED ORAL at 08:05

## 2020-09-19 RX ADMIN — HYDROMORPHONE HYDROCHLORIDE 0.5 MG: 1 INJECTION, SOLUTION INTRAMUSCULAR; INTRAVENOUS; SUBCUTANEOUS at 08:05

## 2020-09-19 RX ADMIN — AMILORIDE HYDROCHLORIDE 5 MG: 5 TABLET ORAL at 08:15

## 2020-09-19 RX ADMIN — OXYCODONE HYDROCHLORIDE 5 MG: 5 TABLET ORAL at 11:24

## 2020-09-19 RX ADMIN — DEXTROSE, SODIUM CHLORIDE, AND POTASSIUM CHLORIDE 75 ML/HR: 5; .45; .3 INJECTION INTRAVENOUS at 06:34

## 2020-09-19 RX ADMIN — OXYCODONE HYDROCHLORIDE 5 MG: 5 TABLET ORAL at 16:07

## 2020-09-19 RX ADMIN — POTASSIUM CHLORIDE 20 MEQ: 14.9 INJECTION, SOLUTION INTRAVENOUS at 14:42

## 2020-09-19 RX ADMIN — HYDROMORPHONE HYDROCHLORIDE 0.5 MG: 1 INJECTION, SOLUTION INTRAMUSCULAR; INTRAVENOUS; SUBCUTANEOUS at 13:06

## 2020-09-19 RX ADMIN — DEXTROSE, SODIUM CHLORIDE, AND POTASSIUM CHLORIDE 75 ML/HR: 5; .45; .3 INJECTION INTRAVENOUS at 19:44

## 2020-09-19 RX ADMIN — HYDROMORPHONE HYDROCHLORIDE 0.5 MG: 1 INJECTION, SOLUTION INTRAMUSCULAR; INTRAVENOUS; SUBCUTANEOUS at 22:06

## 2020-09-19 NOTE — PROGRESS NOTES
Progress Note - Jeferson Serrano 1985, 38 y o  female MRN: [id number]    Unit/Bed#: -01 Encounter: [id number]    Primary Care Provider: Alex oMrales DO   Date and time admitted to hospital: 9/16/2020  6:01 PM        Left lower quadrant abdominal pain  Assessment & Plan  · Patient with persistent LLQ pain, worse with meals  · Due to severe LLQ pain, patient unable to tolerate po, contributing to persistent hypokalemia  · Previous workup unremarkable (CT abdomen, colonoscopy, EGD, exploratory laparotomy)  · GI recommendations appreciated, will assess patient's condition over the weekend to see if patient responds to discontinuation of po K and topiramate; if with improvement, will plan for additional recommended exams to be done as outpatient  · Continue with pain management at this time    * Hypokalemia  Assessment & Plan  · Recurrent severe hypokalemia thought to be due gastric bypass; persistence due to poor po intake  · Poor po intake 2/2 persistent severe left lower quadrant abdominal pain  · Follows outpatient with Nephrology; consulted during this admission as well; recommendations appreciated  · Magnesium level is 1 9  · Was originally on po potassium however persistence of abdominal pain possibly secondary to repeated high doses of po potassium  · Discontinued po potassium as per Nephrology recommendations, will supplement K via IV for now and will continue to monitor response  · Recheck metabolic panel in the morning, trend potassium, replete as needed; ensure Mg wnl as well              Chronic anemia  Assessment & Plan  · Hemoglobin 10 4, was 13 5 in August 2020  · Daily CBC and trend hemoglobin  · Continue iron    Migraine without aura and without status migrainosus, not intractable  Assessment & Plan  · Currently controlled  · Discontinued topiramate as this may contribute to metabolic abnormalities  · Patient may opt to take sumatriptan to abort any migraines that should occur    History of gastric bypass  Assessment & Plan  · Chronic malabsorption with acute on chronic hypokalemia  · Had exploratory laparoscopy 2020-unable to find results in Care everywhere  · Continue outpatient follow-up with bariatric surgery        VTE Pharmacologic Prophylaxis:   Pharmacologic: Enoxaparin (Lovenox)  Mechanical VTE Prophylaxis in Place: Yes    Patient Centered Rounds: I have performed bedside rounds with nursing staff today  Discussions with Specialists or Other Care Team Provider:  Nephrology and GI    Education and Discussions with Family / Patient:  With patient    Time Spent for Care: 30 minutes  More than 50% of total time spent on counseling and coordination of care as described above  Current Length of Stay: 2 day(s)    Current Patient Status: Inpatient   Certification Statement: The patient will continue to require additional inpatient hospital stay due to Persistent hypokalemia  Discharge Plan:  Home once hypokalemia resolves    Code Status: Level 1 - Full Code      Subjective:   Patient seen and examined at bedside today  Patient still with pain on the left lower quadrant  Patient notes that the pain is much more tolerable and slightly getting better  Patient would like to stay 1 more day to just optimize her potassium levels  She is amenable to the plan of discarding her p o  Potassium and just getting potassium IV for repletion  Objective:     Vitals:   Temp (24hrs), Av 8 °F (36 6 °C), Min:97 4 °F (36 3 °C), Max:98 1 °F (36 7 °C)    Temp:  [97 4 °F (36 3 °C)-98 1 °F (36 7 °C)] 98 1 °F (36 7 °C)  HR:  [82-93] 82  Resp:  [16-18] 18  BP: ()/(57-76) 100/57  SpO2:  [98 %-100 %] 100 %  Body mass index is 24 8 kg/m²  Input and Output Summary (last 24 hours):        Intake/Output Summary (Last 24 hours) at 2020 1306  Last data filed at 2020 1014  Gross per 24 hour   Intake 1182 5 ml   Output    Net 1182 5 ml       Physical Exam:     Physical Exam  Constitutional:       Appearance: Normal appearance  HENT:      Head: Normocephalic and atraumatic  Nose: Nose normal       Mouth/Throat:      Mouth: Mucous membranes are moist    Eyes:      Extraocular Movements: Extraocular movements intact  Conjunctiva/sclera: Conjunctivae normal       Pupils: Pupils are equal, round, and reactive to light  Neck:      Musculoskeletal: Normal range of motion  Cardiovascular:      Rate and Rhythm: Normal rate and regular rhythm  Pulmonary:      Effort: Pulmonary effort is normal       Breath sounds: Normal breath sounds  Abdominal:      General: Bowel sounds are normal       Palpations: Abdomen is soft  Tenderness: There is abdominal tenderness  Musculoskeletal: Normal range of motion  Skin:     General: Skin is warm and dry  Neurological:      General: No focal deficit present  Mental Status: She is alert and oriented to person, place, and time  Psychiatric:         Mood and Affect: Mood normal          Behavior: Behavior normal            Additional Data:     Labs:    Results from last 7 days   Lab Units 09/19/20  0538   WBC Thousand/uL 3 74*   HEMOGLOBIN g/dL 10 9*   HEMATOCRIT % 34 9   PLATELETS Thousands/uL 263   NEUTROS PCT % 41*   LYMPHS PCT % 46*   MONOS PCT % 9   EOS PCT % 3     Results from last 7 days   Lab Units 09/19/20  0538   SODIUM mmol/L 139   POTASSIUM mmol/L 3 3*   CHLORIDE mmol/L 108   CO2 mmol/L 19*   BUN mg/dL 6   CREATININE mg/dL 0 89   ANION GAP mmol/L 12   CALCIUM mg/dL 7 8*   ALBUMIN g/dL 2 9*   TOTAL BILIRUBIN mg/dL 0 11*   ALK PHOS U/L 50   ALT U/L 24   AST U/L 17   GLUCOSE RANDOM mg/dL 79                 Results from last 7 days   Lab Units 09/16/20  1832   LACTIC ACID mmol/L 0 5           * I Have Reviewed All Lab Data Listed Above  * Additional Pertinent Lab Tests Reviewed:  All Labs For Current Hospital Admission Reviewed    Imaging:    Imaging Reports Reviewed Today Include:  None  Imaging Personally Reviewed by Myself Includes:  None    Recent Cultures (last 7 days):  None          Last 24 Hours Medication List:   Current Facility-Administered Medications   Medication Dose Route Frequency Provider Last Rate    acetaminophen  650 mg Oral Q6H PRN Chano Garcia, ARIADNA      AMILoride  5 mg Oral Daily ARIADNA Lawrence      amitriptyline  50 mg Oral HS ARIADNA Lawrence      cyclobenzaprine  10 mg Oral TID ARIADNA Lawrence      dextrose 5 % and sodium chloride 0 45 % with KCl 40 mEq/L  75 mL/hr Intravenous Continuous Qamar Blaze, DO 75 mL/hr (09/19/20 9692)    enoxaparin  40 mg Subcutaneous Q24H [address] Thuy Elsa Joseph MD      ferrous sulfate  325 mg Oral Every Other Day ARIADNA Lawrence      HYDROmorphone  0 5 mg Intravenous Q4H PRN Chano Garcia, ARIADNA      ondansetron  4 mg Intravenous Q6H PRN Chano Garcia, ARIADNA      oxyCODONE  5 mg Oral Q4H PRN Chano Murrays, ARIADNA      polyethylene glycol  17 g Oral Daily ARIADNA Lawrence          Today, Patient Was Seen By: Pebbles George MD    ** Please Note: Dictation voice to text software may have been used in the creation of this document   **

## 2020-09-19 NOTE — PLAN OF CARE
Problem: Potential for Falls  Goal: Patient will remain free of falls  Description: INTERVENTIONS:  - Assess patient frequently for physical needs  -  Identify cognitive and physical deficits and behaviors that affect risk of falls  -  [place] fall precautions as indicated by assessment   - Educate patient/family on patient safety including physical limitations  - Instruct patient to call for assistance with activity based on assessment  - Modify environment to reduce risk of injury  - Consider OT/PT consult to assist with strengthening/mobility  Outcome: Progressing     Problem: Nutrition/Hydration-ADULT  Goal: Nutrient/Hydration intake appropriate for improving, restoring or maintaining nutritional needs  Description: Monitor and assess patient's nutrition/hydration status for malnutrition  Collaborate with interdisciplinary team and initiate plan and interventions as ordered  Monitor patient's weight and dietary intake as ordered or per policy  Utilize nutrition screening tool and intervene as necessary  Determine patient's food preferences and provide high-protein, high-caloric foods as appropriate       INTERVENTIONS:  - Monitor oral intake, urinary output, labs, and treatment plans  - Assess nutrition and hydration status and recommend course of action  - Evaluate amount of meals eaten  - Assist patient with eating if necessary   - Allow adequate time for meals  - Recommend/ encourage appropriate diets, oral nutritional supplements, and vitamin/mineral supplements  - Order, calculate, and assess calorie counts as needed  - Recommend, monitor, and adjust tube feedings and TPN/PPN based on assessed needs  - Assess need for intravenous fluids  - Provide specific nutrition/hydration education as appropriate  - Include patient/family/caregiver in decisions related to nutrition  Outcome: Progressing     Problem: PAIN - ADULT  Goal: Verbalizes/displays adequate comfort level or baseline comfort level  Description: Interventions:  - Encourage patient to monitor pain and request assistance  - Assess pain using appropriate pain scale, 0-10  - Administer analgesics based on type and severity of pain and evaluate response  - Implement non-pharmacological measures as appropriate and evaluate response  - Consider cultural and social influences on pain and pain management  - Notify physician/advanced practitioner if interventions unsuccessful or patient reports new pain  Outcome: Progressing     Problem: INFECTION - ADULT  Goal: Absence or prevention of progression during hospitalization  Description: INTERVENTIONS:  - Assess and monitor for signs and symptoms of infection  - Monitor lab/diagnostic results  - Monitor all insertion sites, i e  indwelling lines, tubes, and drains  - Monitor endotracheal if appropriate and nasal secretions for changes in amount and color  - [place] appropriate cooling/warming therapies per order  - Administer medications as ordered  - Instruct and encourage patient and family to use good hand hygiene technique  - Identify and instruct in appropriate isolation precautions for identified infection/condition  Outcome: Progressing     Problem: SAFETY ADULT  Goal: Patient will remain free of falls  Description: INTERVENTIONS:  - Assess patient frequently for physical needs  -  Identify cognitive and physical deficits and behaviors that affect risk of falls    -  [place] fall precautions as indicated by assessment   - Educate patient/family on patient safety including physical limitations  - Instruct patient to call for assistance with activity based on assessment  - Modify environment to reduce risk of injury  - Consider OT/PT consult to assist with strengthening/mobility  Outcome: Progressing  Goal: Maintain or return to baseline ADL function  Description: INTERVENTIONS:  -  Assess patient's ability to carry out ADLs; assess patient's baseline for ADL function and identify physical deficits which impact ability to perform ADLs (bathing, care of mouth/teeth, toileting, grooming, dressing, etc )  - Assess/evaluate cause of self-care deficits   - Assess range of motion  - Assess patient's mobility; develop plan if impaired  - Assess patient's need for assistive devices and provide as appropriate  - Encourage maximum independence but intervene and supervise when necessary  - Involve family in performance of ADLs  - Assess for home care needs following discharge   - Consider OT consult to assist with ADL evaluation and planning for discharge  - Provide patient education as appropriate  Outcome: Progressing  Goal: Maintain or return mobility status to optimal level  Description: INTERVENTIONS:  - Assess patient's baseline mobility status (ambulation, transfers, stairs, etc )    - Identify cognitive and physical deficits and behaviors that affect mobility  - Identify mobility aids required to assist with transfers and/or ambulation (gait belt, sit-to-stand, lift, walker, cane, etc )  - [place] fall precautions as indicated by assessment  - Record patient progress and toleration of activity level on Mobility SBAR; progress patient to next Phase/Stage  - Instruct patient to call for assistance with activity based on assessment  - Consider rehabilitation consult to assist with strengthening/weightbearing, etc   Outcome: Progressing     Problem: DISCHARGE PLANNING  Goal: Discharge to home or other facility with appropriate resources  Description: INTERVENTIONS:  - Identify barriers to discharge w/patient and caregiver  - Arrange for needed discharge resources and transportation as appropriate  - Identify discharge learning needs (meds, wound care, etc )  - Arrange for interpretive services to assist at discharge as needed  - Refer to Case Management Department for coordinating discharge planning if the patient needs post-hospital services based on physician/advanced practitioner order or complex needs related to functional status, cognitive ability, or social support system  Outcome: Progressing     Problem: Knowledge Deficit  Goal: Patient/family/caregiver demonstrates understanding of disease process, treatment plan, medications, and discharge instructions  Description: Complete learning assessment and assess knowledge base    Interventions:  - Provide teaching at level of understanding  - Provide teaching via preferred learning methods  Outcome: Progressing     Problem: GASTROINTESTINAL - ADULT  Goal: Minimal or absence of nausea and/or vomiting  Description: INTERVENTIONS:  - Administer IV fluids if ordered to ensure adequate hydration  -  -  - Administer ordered antiemetic medications as needed  - Provide nonpharmacologic comfort measures as appropriate  - Advance diet as tolerated, if ordered  - Consider nutrition services referral to assist patient with adequate nutrition and appropriate food choices  Outcome: Progressing  Goal: Maintains or returns to baseline bowel function  Description: INTERVENTIONS:  - Assess bowel function  - Encourage oral fluids to ensure adequate hydration  - Administer IV fluids if ordered to ensure adequate hydration  - Administer ordered medications as needed  - Encourage mobilization and activity  - Consider nutritional services referral to assist patient with adequate nutrition and appropriate food choices  Outcome: Progressing  Goal: Maintains adequate nutritional intake  Description: INTERVENTIONS:  - Monitor percentage of each meal consumed  - Identify factors contributing to decreased intake, treat as appropriate  - Assist with meals as needed  - Monitor I&O, weight, and lab values if indicated  - Obtain nutrition services referral as needed  Outcome: Progressing     Problem: METABOLIC, FLUID AND ELECTROLYTES - ADULT  Goal: Electrolytes maintained within normal limits  Description: INTERVENTIONS:  - Monitor labs and assess patient for signs and symptoms of electrolyte imbalances, potassium levels  - Administer electrolyte replacement as ordered  - Monitor response to electrolyte replacements, including repeat lab results as appropriate  - Instruct patient on fluid and nutrition as appropriate  Outcome: Progressing  Goal: Fluid balance maintained  Description: INTERVENTIONS:  - Monitor labs   - Monitor I/O and WT  - Instruct patient on fluid and nutrition as appropriate  - Assess for signs & symptoms of volume excess or deficit  Outcome: Progressing

## 2020-09-19 NOTE — PROGRESS NOTES
Progress Note - Nephrology   Colton Davenport 38 y o  female MRN: [id number]  Unit/Bed#: -01 Encounter: [id number]    A/P:  1  Hypokalemia  This is been ongoing long-term problem  The patient has been taking IV potassium 80 mEq use alternating with 40 mEq daily at home on an infusion pump  She has some abdominal discomfort and did not eat properly and potassium dropped  She has had an upper endoscopy and a colonoscopy as well as evaluation by her bariatric surgeon with laparoscopy to  evaluate the source of the pain  It has been fruitless unfortunately  She is not taking oral potassium at this time due to possible retaining affect  Of note is that she has an appointment in October to travel to the [facility] in [facility] for a diagnostic dilemma evaluation by [facility]  We are very much looking forward to their findings  Will continue IV potassium and if she has repleted she can be discharged tomorrow to home therapy  2  Severe postprandial pain  Appreciate GI evaluation which suggests dedicated MRI of the pancreas with IV contrast and mesenteric Dopplers  3  Non-anion gap metabolic acidosis  She had been on sodium bicarbonate orally in the past and may need this again    She needs to have a repleted potassium      Follow up reason for today's visit:  Hypokalemia    Hypokalemia    Patient Active Problem List   Diagnosis    Hypokalemia    History of gastric bypass    Migraine without aura and without status migrainosus, not intractable    Left-sided weakness    Hypophosphatemia    Chronic anemia    Vitamin D deficiency    Elevated CPK    Vitamin B12 deficiency    Cervical lymphadenopathy    Fatigue    Paresthesia of both lower extremities    Paresthesias    B12 deficiency    Gastric bypass status for obesity    GERD (gastroesophageal reflux disease)    Migraine    WAGNER (obstructive sleep apnea)    Other intestinal malabsorption    Acute kidney injury ([address] )    Right ovarian cyst    Chest pain    Nausea & vomiting    Vertigo    Stress fracture of right foot with routine healing    Normal anion gap metabolic acidosis    Middle ear effusion    Left lower quadrant abdominal pain         Subjective:   Had nausea and abdominal pain requiring ondantreson  No chest pain or dyspnea or dusyria    Objective:     Vitals: Blood pressure 103/63, pulse 88, temperature 98 1 °F (36 7 °C), resp  rate 17, height 5' 3" (1 6 m), weight 63 5 kg (140 lb), SpO2 99 %  ,Body mass index is 24 8 kg/m²  Weight (last 2 days)     Date/Time   Weight    09/19/20 0600   63 5 (140)    09/19/20 0529   63 5 (140)    09/18/20 0600   65 2 (143 74)                Intake/Output Summary (Last 24 hours) at 9/19/2020 1542  Last data filed at 9/19/2020 1441  Gross per 24 hour   Intake 1080 ml   Output    Net 1080 ml     I/O last 3 completed shifts: In: 2388 8 [P O :220;  I V :2068 8; IV Piggyback:100]  Out: -          Physical Exam: /63   Pulse 88   Temp 98 1 °F (36 7 °C)   Resp 17   Ht 5' 3" (1 6 m)   Wt 63 5 kg (140 lb)   LMP  (LMP Unknown)   SpO2 99%   BMI 24 80 kg/m²     General Appearance:    Alert, cooperative, no distress, appears stated age   Head:    Normocephalic, without obvious abnormality, atraumatic   Eyes:    Conjunctiva/corneas clear   Ears:    Normal external ears   Nose:   Nares normal, septum midline, mucosa normal, no drainage    or sinus tenderness   Throat:   Lips, mucosa, and tongue normal; teeth and gums normal   Neck:   Supple, symmetrical, trachea midline, no adenopathy;        thyroid:  No enlargement/tenderness/nodules; no carotid    bruit or JVD   Back:     Symmetric, no curvature, ROM normal, no CVA tenderness   Lungs:     Clear to auscultation bilaterally, respirations unlabored   Chest wall:    No tenderness or deformity   Heart:    Regular rate and rhythm, S1 and S2 normal, no murmur, rub   or gallop   Abdomen:     Soft, mildly tender in LLQ, bowel sounds active Extremities:   Extremities normal, atraumatic, no cyanosis or edema   Skin:   Skin color, texture, turgor normal, no rashes or lesions   Lymph nodes:   Cervical normal   Neurologic:   CNII-XII intact            Lab, Imaging and other studies: I have personally reviewed pertinent labs  CBC:   Lab Results   Component Value Date    WBC 3 74 (L) 09/19/2020    HGB 10 9 (L) 09/19/2020    HCT 34 9 09/19/2020    MCV 87 09/19/2020     09/19/2020    MCH 27 1 09/19/2020    MCHC 31 2 (L) 09/19/2020    RDW 17 9 (H) 09/19/2020    MPV 10 4 09/19/2020    NRBC 0 09/19/2020     CMP:   Lab Results   Component Value Date    K 3 4 (L) 09/19/2020     09/19/2020    CO2 18 (L) 09/19/2020    BUN 6 09/19/2020    CREATININE 0 88 09/19/2020    CALCIUM 8 0 (L) 09/19/2020    AST 17 09/19/2020    ALT 24 09/19/2020    ALKPHOS 50 09/19/2020    EGFR 85 09/19/2020         Results from last 7 days   Lab Units 09/19/20  1308 09/19/20  0538 09/18/20 2050  09/16/20  1904   POTASSIUM mmol/L 3 4* 3 3* 2 9*   < > 2 4*   CHLORIDE mmol/L 107 108 107   < > 108   CO2 mmol/L 18* 19* 17*   < > 19*   BUN mg/dL 6 6 6   < > 11   CREATININE mg/dL 0 88 0 89 0 94   < > 0 99   CALCIUM mg/dL 8 0* 7 8* 7 9*   < > 8 2*   ALK PHOS U/L  --  50  --   --  55   ALT U/L  --  24  --   --  29   AST U/L  --  17  --   --  20    < > = values in this interval not displayed  Phosphorus:   Lab Results   Component Value Date    PHOS 3 2 09/19/2020     Magnesium:   Lab Results   Component Value Date    MG 1 9 09/19/2020     Urinalysis: No results found for: Avelino Remi, SPECGRAV, PHUR, LEUKOCYTESUR, NITRITE, PROTEINUA, GLUCOSEU, KETONESU, BILIRUBINUR, BLOODU  Ionized Calcium: No results found for: CAION  Coagulation: No results found for: PT, INR, APTT  Troponin: No results found for: TROPONINI  ABG: No results found for: PHART, PNK6ZEU, PO2ART, BHB8JSL, T2EHRHEP, BEART, SOURCE  Radiology review:     IMAGING  No results found      Current Facility-Administered Medications   Medication Dose Route Frequency    acetaminophen (TYLENOL) tablet 650 mg  650 mg Oral Q6H PRN    AMILoride tablet 5 mg  5 mg Oral Daily    amitriptyline (ELAVIL) tablet 50 mg  50 mg Oral HS    cyclobenzaprine (FLEXERIL) tablet 10 mg  10 mg Oral TID    dextrose 5 % and sodium chloride 0 45 % with KCl 40 mEq/L infusion (premix)  75 mL/hr Intravenous Continuous    enoxaparin (LOVENOX) subcutaneous injection 40 mg  40 mg Subcutaneous Q24H MIKE    ferrous sulfate tablet 325 mg  325 mg Oral Every Other Day    HYDROmorphone (DILAUDID) injection 0 5 mg  0 5 mg Intravenous Q4H PRN    ondansetron (ZOFRAN) injection 4 mg  4 mg Intravenous Q6H PRN    oxyCODONE (ROXICODONE) IR tablet 5 mg  5 mg Oral Q4H PRN    polyethylene glycol (MIRALAX) packet 17 g  17 g Oral Daily    potassium chloride 20 mEq IVPB (premix)  20 mEq Intravenous Once     Medications Discontinued During This Encounter   Medication Reason    mupirocin (BACTROBAN) 2 % ointment     potassium chloride 40 mEq IVPB (premix)     b complex-C-folic acid (NEPHRO-EDIE) 0 8 mg tablet     thiamine 100 MG tablet     zinc sulfate (ZINCATE) 220 mg capsule     SUMAtriptan (IMITREX) 100 mg tablet     potassium chloride 40 mEq IVPB (premix) Availability    potassium chloride 40 mEq IVPB (premix) Availability    topiramate (TOPAMAX) tablet 25 mg     potassium chloride oral solution 60 mEq     potassium chloride oral solution 40 mEq     sodium chloride 0 9 % infusion     potassium chloride 20 mEq IVPB (premix)     potassium chloride 20 mEq IVPB (premix)     potassium chloride (K-DUR,KLOR-CON) CR tablet 40 mEq     potassium chloride (K-DUR,KLOR-CON) CR tablet 40 mEq     potassium chloride 20 mEq IVPB (premix)        Skylar Justin MD      This progress note was produced in part using a dictation device which may document imprecise wording from author's original intent

## 2020-09-19 NOTE — NURSING NOTE
Notified Dr Dilia Pressley patient refusing lovenox states "I am moving around a lot walking " explained contradictions refused   No new orders continue to monitor patient

## 2020-09-20 VITALS
DIASTOLIC BLOOD PRESSURE: 84 MMHG | HEART RATE: 94 BPM | WEIGHT: 141.09 LBS | TEMPERATURE: 98 F | SYSTOLIC BLOOD PRESSURE: 130 MMHG | BODY MASS INDEX: 25 KG/M2 | RESPIRATION RATE: 17 BRPM | HEIGHT: 63 IN | OXYGEN SATURATION: 100 %

## 2020-09-20 LAB
ALBUMIN SERPL BCP-MCNC: 3.1 G/DL (ref 3.5–5)
ALP SERPL-CCNC: 75 U/L (ref 46–116)
ALT SERPL W P-5'-P-CCNC: 76 U/L (ref 12–78)
ANION GAP SERPL CALCULATED.3IONS-SCNC: 13 MMOL/L (ref 4–13)
ANION GAP SERPL CALCULATED.3IONS-SCNC: 14 MMOL/L (ref 4–13)
AST SERPL W P-5'-P-CCNC: 128 U/L (ref 5–45)
BILIRUB SERPL-MCNC: 0.12 MG/DL (ref 0.2–1)
BUN SERPL-MCNC: 7 MG/DL (ref 5–25)
BUN SERPL-MCNC: 8 MG/DL (ref 5–25)
CALCIUM ALBUM COR SERPL-MCNC: 8.7 MG/DL (ref 8.3–10.1)
CALCIUM SERPL-MCNC: 7.8 MG/DL (ref 8.3–10.1)
CALCIUM SERPL-MCNC: 8 MG/DL (ref 8.3–10.1)
CHLORIDE SERPL-SCNC: 106 MMOL/L (ref 100–108)
CHLORIDE SERPL-SCNC: 106 MMOL/L (ref 100–108)
CO2 SERPL-SCNC: 17 MMOL/L (ref 21–32)
CO2 SERPL-SCNC: 17 MMOL/L (ref 21–32)
CREAT SERPL-MCNC: 0.81 MG/DL (ref 0.6–1.3)
CREAT SERPL-MCNC: 0.84 MG/DL (ref 0.6–1.3)
GFR SERPL CREATININE-BSD FRML MDRD: 90 ML/MIN/1.73SQ M
GFR SERPL CREATININE-BSD FRML MDRD: 94 ML/MIN/1.73SQ M
GLUCOSE SERPL-MCNC: 82 MG/DL (ref 65–140)
GLUCOSE SERPL-MCNC: 83 MG/DL (ref 65–140)
MAGNESIUM SERPL-MCNC: 1.9 MG/DL (ref 1.6–2.6)
MAGNESIUM SERPL-MCNC: 1.9 MG/DL (ref 1.6–2.6)
PHOSPHATE SERPL-MCNC: 2.6 MG/DL (ref 2.7–4.5)
POTASSIUM SERPL-SCNC: 3.8 MMOL/L (ref 3.5–5.3)
POTASSIUM SERPL-SCNC: 3.8 MMOL/L (ref 3.5–5.3)
PROT SERPL-MCNC: 6.3 G/DL (ref 6.4–8.2)
SODIUM SERPL-SCNC: 136 MMOL/L (ref 136–145)
SODIUM SERPL-SCNC: 137 MMOL/L (ref 136–145)

## 2020-09-20 PROCEDURE — 80048 BASIC METABOLIC PNL TOTAL CA: CPT | Performed by: STUDENT IN AN ORGANIZED HEALTH CARE EDUCATION/TRAINING PROGRAM

## 2020-09-20 PROCEDURE — 83735 ASSAY OF MAGNESIUM: CPT | Performed by: NURSE PRACTITIONER

## 2020-09-20 PROCEDURE — 83735 ASSAY OF MAGNESIUM: CPT | Performed by: STUDENT IN AN ORGANIZED HEALTH CARE EDUCATION/TRAINING PROGRAM

## 2020-09-20 PROCEDURE — 99239 HOSP IP/OBS DSCHRG MGMT >30: CPT | Performed by: STUDENT IN AN ORGANIZED HEALTH CARE EDUCATION/TRAINING PROGRAM

## 2020-09-20 PROCEDURE — 99232 SBSQ HOSP IP/OBS MODERATE 35: CPT | Performed by: INTERNAL MEDICINE

## 2020-09-20 PROCEDURE — 84100 ASSAY OF PHOSPHORUS: CPT | Performed by: NURSE PRACTITIONER

## 2020-09-20 PROCEDURE — 80053 COMPREHEN METABOLIC PANEL: CPT | Performed by: NURSE PRACTITIONER

## 2020-09-20 RX ORDER — POTASSIUM CHLORIDE 29.8 MG/ML
40 INJECTION INTRAVENOUS ONCE
Status: DISCONTINUED | OUTPATIENT
Start: 2020-09-20 | End: 2020-09-20 | Stop reason: RX

## 2020-09-20 RX ORDER — POTASSIUM CHLORIDE 14.9 MG/ML
20 INJECTION INTRAVENOUS
Status: COMPLETED | OUTPATIENT
Start: 2020-09-20 | End: 2020-09-20

## 2020-09-20 RX ORDER — OXYCODONE HYDROCHLORIDE 5 MG/1
5 TABLET ORAL EVERY 6 HOURS PRN
Qty: 8 TABLET | Refills: 0 | Status: SHIPPED | OUTPATIENT
Start: 2020-09-20 | End: 2020-10-01 | Stop reason: HOSPADM

## 2020-09-20 RX ADMIN — HYDROMORPHONE HYDROCHLORIDE 0.5 MG: 1 INJECTION, SOLUTION INTRAMUSCULAR; INTRAVENOUS; SUBCUTANEOUS at 11:49

## 2020-09-20 RX ADMIN — FERROUS SULFATE TAB 325 MG (65 MG ELEMENTAL FE) 325 MG: 325 (65 FE) TAB at 09:02

## 2020-09-20 RX ADMIN — POTASSIUM CHLORIDE 20 MEQ: 14.9 INJECTION, SOLUTION INTRAVENOUS at 07:41

## 2020-09-20 RX ADMIN — DEXTROSE, SODIUM CHLORIDE, AND POTASSIUM CHLORIDE 75 ML/HR: 5; .45; .3 INJECTION INTRAVENOUS at 09:06

## 2020-09-20 RX ADMIN — POTASSIUM PHOSPHATE, MONOBASIC AND POTASSIUM PHOSPHATE, DIBASIC 12 MMOL: 224; 236 INJECTION, SOLUTION INTRAVENOUS at 14:25

## 2020-09-20 RX ADMIN — ONDANSETRON 4 MG: 2 INJECTION INTRAMUSCULAR; INTRAVENOUS at 07:02

## 2020-09-20 RX ADMIN — CYCLOBENZAPRINE HYDROCHLORIDE 10 MG: 10 TABLET, FILM COATED ORAL at 17:54

## 2020-09-20 RX ADMIN — HYDROMORPHONE HYDROCHLORIDE 0.5 MG: 1 INJECTION, SOLUTION INTRAMUSCULAR; INTRAVENOUS; SUBCUTANEOUS at 15:50

## 2020-09-20 RX ADMIN — POTASSIUM CHLORIDE 20 MEQ: 14.9 INJECTION, SOLUTION INTRAVENOUS at 05:27

## 2020-09-20 RX ADMIN — CYCLOBENZAPRINE HYDROCHLORIDE 10 MG: 10 TABLET, FILM COATED ORAL at 09:02

## 2020-09-20 RX ADMIN — HYDROMORPHONE HYDROCHLORIDE 0.5 MG: 1 INJECTION, SOLUTION INTRAMUSCULAR; INTRAVENOUS; SUBCUTANEOUS at 07:01

## 2020-09-20 RX ADMIN — AMILORIDE HYDROCHLORIDE 5 MG: 5 TABLET ORAL at 09:03

## 2020-09-20 NOTE — DISCHARGE SUMMARY
Discharge- [facility] Mechelle 1985, 38 y o  female MRN: [id number]    Unit/Bed#: -01 Encounter: [id number]    Primary Care Provider: Mike Woodson DO   Date and time admitted to hospital: 9/16/2020  6:01 PM        Left lower quadrant abdominal pain  Assessment & Plan  · Patient with persistent LLQ pain, worse with meals  · Due to severe LLQ pain, patient unable to tolerate po, contributing to persistent hypokalemia  · Previous workup unremarkable (CT abdomen, colonoscopy, EGD, exploratory laparotomy)  · GI recommendations appreciated, will assess patient's condition over the weekend to see if patient responds to discontinuation of po K and topiramate; if with improvement, will plan for additional recommended exams to be done as outpatient  · Patient's abdominal pain progressively improving since stopping p o  Potassium    * Hypokalemia  Assessment & Plan  · Recurrent severe hypokalemia thought to be due gastric bypass; persistence due to poor po intake  · Poor po intake 2/2 persistent severe left lower quadrant abdominal pain which has currently been improving since stopping p o   Potassium  · Follows outpatient with Nephrology; consulted during this admission as well; recommendations appreciated  · Magnesium level has remained within normal limits  · Was originally on po potassium however persistence of abdominal pain possibly secondary to repeated high doses of po potassium  · Discontinued po potassium as per Nephrology recommendations, will supplement K via IV for now and will continue to monitor response  · It is potassium today at 3 8 will discharge patient today with prescriptions for IV potassium via MICHAEL              Chronic anemia  Assessment & Plan  · Hemoglobin 10 4, was 13 5 in August 2020  · Daily CBC and trend hemoglobin  · Continue iron    Migraine without aura and without status migrainosus, not intractable  Assessment & Plan  · Currently controlled  · Discontinued topiramate as this may contribute to metabolic abnormalities  · Patient may opt to take sumatriptan to abort any migraines that should occur    History of gastric bypass  Assessment & Plan  · Chronic malabsorption with acute on chronic hypokalemia  · Had exploratory laparoscopy 09/02/2020-unable to find results in Care everywhere  · Referred patient to GI as outpatient          Discharging Physician / Practitioner: Joyce Fajardo MD  PCP: Pao Tate DO  Admission Date:   Admission Orders (From admission, onward)     Ordered        09/17/20 1638  Inpatient Admission  Once         09/16/20 1951  Place in Observation  Once                   Discharge Date: 09/20/20    Resolved Problems  Date Reviewed: 9/16/2020    None          Consultations During Hospital Stay:  · Nephrology and Gastroenterology    Procedures Performed:   · None    Significant Findings / Test Results:   · Hypokalemia    Incidental Findings:   · None    Test Results Pending at Discharge (will require follow up): · None     Outpatient Tests Requested:  · None    Complications:  None    Reason for Admission:  Left lower quadrant pain, inability to tolerate p o , and hypokalemia    Hospital Course: Femi Valiente is a 38 y o  female patient who originally presented to the hospital on 9/16/2020 due to severe left lower quadrant pain and to inability to tolerate p o  causing persistent severe hypokalemia  Throughout the patient's admission she was continued on IV potassium supplementation while potassium levels were being monitored  Pain was controlled with pain medications and vomiting was also controlled on Zofran  Nephrology was also consulted during this admission, recommended trial of stopping p o  Potassium given known side effect of causing abdominal pain  GI was also consulted, recommended tests noted however patient's pain improved on discontinuation of p o  Potassium  Will have patient follow-up with them as outpatient  Patient's latest potassium level at 3 8  Will discharge patient today with prescriptions for IV potassium  Patient to follow-up with nephrology, GI, and her PCP on discharge  Please see above list of diagnoses and related plan for additional information  Condition at Discharge: stable     Discharge Day Visit / Exam:     Subjective:  Patient seen and examined at bedside  Patient's pain much improved  Patient able to tolerate p o  Mireya Cruel Aware of the plan for discharge today as long as potassium levels within normal limits  Patient amenable to the plan  Patient informed that she would have to follow-up with nephrology and GI on discharge  Vitals: Blood Pressure: 111/64 (09/20/20 0635)  Pulse: 92 (09/20/20 0635)  Temperature: 98 1 °F (36 7 °C) (09/20/20 0635)  Temp Source: Oral (09/18/20 0036)  Respirations: 18 (09/20/20 6460)  Height: 5' 3" (160 cm) (09/18/20 1140)  Weight - Scale: 64 kg (141 lb 1 5 oz) (09/20/20 0600)  SpO2: 100 % (09/20/20 6237)  Exam:   Physical Exam  Constitutional:       General: She is not in acute distress  Appearance: Normal appearance  She is obese  She is not ill-appearing  HENT:      Head: Normocephalic and atraumatic  Nose: Nose normal       Mouth/Throat:      Mouth: Mucous membranes are moist    Eyes:      Extraocular Movements: Extraocular movements intact  Conjunctiva/sclera: Conjunctivae normal    Neck:      Musculoskeletal: Normal range of motion and neck supple  Cardiovascular:      Rate and Rhythm: Normal rate and regular rhythm  Pulses: Normal pulses  Heart sounds: Normal heart sounds  Pulmonary:      Effort: Pulmonary effort is normal       Breath sounds: Normal breath sounds  Abdominal:      General: Bowel sounds are normal  There is no distension  Palpations: Abdomen is soft  Tenderness: There is abdominal tenderness  There is no guarding  Musculoskeletal:         General: No swelling or tenderness        Right lower leg: No edema  Left lower leg: No edema  Skin:     General: Skin is warm and dry  Neurological:      General: No focal deficit present  Mental Status: She is alert and oriented to person, place, and time  Psychiatric:         Mood and Affect: Mood normal          Behavior: Behavior normal        Discussion with Family:  None    Discharge instructions/Information to patient and family:   See after visit summary for information provided to patient and family  Provisions for Follow-Up Care:  See after visit summary for information related to follow-up care and any pertinent home health orders  Disposition:     Home    For Discharges to   [address] [facility]:   · Not Applicable to this Patient - Not Applicable to this Patient    Planned Readmission:  If condition gets worse, patient aware she can come back the ED  Discharge Statement:  I spent >45 minutes discharging the patient  This time was spent on the day of discharge  I had direct contact with the patient on the day of discharge  Greater than 50% of the total time was spent examining patient, answering all patient questions, arranging and discussing plan of care with patient as well as directly providing post-discharge instructions  Additional time then spent on discharge activities  Discharge Medications:  See after visit summary for reconciled discharge medications provided to patient and family        ** Please Note: This note has been constructed using a voice recognition system **

## 2020-09-20 NOTE — PLAN OF CARE
Problem: Potential for Falls  Goal: Patient will remain free of falls  Description: INTERVENTIONS:  - Assess patient frequently for physical needs  -  Identify cognitive and physical deficits and behaviors that affect risk of falls  -  [place] fall precautions as indicated by assessment   - Educate patient/family on patient safety including physical limitations  - Instruct patient to call for assistance with activity based on assessment  - Modify environment to reduce risk of injury  - Consider OT/PT consult to assist with strengthening/mobility  Outcome: Progressing     Problem: Nutrition/Hydration-ADULT  Goal: Nutrient/Hydration intake appropriate for improving, restoring or maintaining nutritional needs  Description: Monitor and assess patient's nutrition/hydration status for malnutrition  Collaborate with interdisciplinary team and initiate plan and interventions as ordered  Monitor patient's weight and dietary intake as ordered or per policy  Utilize nutrition screening tool and intervene as necessary  Determine patient's food preferences and provide high-protein, high-caloric foods as appropriate       INTERVENTIONS:  - Monitor oral intake, urinary output, labs, and treatment plans  - Assess nutrition and hydration status and recommend course of action  - Evaluate amount of meals eaten  - Assist patient with eating if necessary   - Allow adequate time for meals  - Recommend/ encourage appropriate diets, oral nutritional supplements, and vitamin/mineral supplements  - Order, calculate, and assess calorie counts as needed  - Recommend, monitor, and adjust tube feedings and TPN/PPN based on assessed needs  - Assess need for intravenous fluids  - Provide specific nutrition/hydration education as appropriate  - Include patient/family/caregiver in decisions related to nutrition  Outcome: Progressing     Problem: PAIN - ADULT  Goal: Verbalizes/displays adequate comfort level or baseline comfort level  Description: Interventions:  - Encourage patient to monitor pain and request assistance  - Assess pain using appropriate pain scale, 0-10  - Administer analgesics based on type and severity of pain and evaluate response  - Implement non-pharmacological measures as appropriate and evaluate response  - Consider cultural and social influences on pain and pain management  - Notify physician/advanced practitioner if interventions unsuccessful or patient reports new pain  Outcome: Progressing     Problem: INFECTION - ADULT  Goal: Absence or prevention of progression during hospitalization  Description: INTERVENTIONS:  - Assess and monitor for signs and symptoms of infection  - Monitor lab/diagnostic results  - Monitor all insertion sites, i e  indwelling lines, tubes, and drains  - Monitor endotracheal if appropriate and nasal secretions for changes in amount and color  - [place] appropriate cooling/warming therapies per order  - Administer medications as ordered  - Instruct and encourage patient and family to use good hand hygiene technique  - Identify and instruct in appropriate isolation precautions for identified infection/condition  Outcome: Progressing     Problem: SAFETY ADULT  Goal: Patient will remain free of falls  Description: INTERVENTIONS:  - Assess patient frequently for physical needs  -  Identify cognitive and physical deficits and behaviors that affect risk of falls    -  [place] fall precautions as indicated by assessment   - Educate patient/family on patient safety including physical limitations  - Instruct patient to call for assistance with activity based on assessment  - Modify environment to reduce risk of injury  - Consider OT/PT consult to assist with strengthening/mobility  Outcome: Progressing  Goal: Maintain or return to baseline ADL function  Description: INTERVENTIONS:  -  Assess patient's ability to carry out ADLs; assess patient's baseline for ADL function and identify physical deficits which impact ability to perform ADLs (bathing, care of mouth/teeth, toileting, grooming, dressing, etc )  - Assess/evaluate cause of self-care deficits   - Assess range of motion  - Assess patient's mobility; develop plan if impaired  - Assess patient's need for assistive devices and provide as appropriate  - Encourage maximum independence but intervene and supervise when necessary  - Involve family in performance of ADLs  - Assess for home care needs following discharge   - Consider OT consult to assist with ADL evaluation and planning for discharge  - Provide patient education as appropriate  Outcome: Progressing  Goal: Maintain or return mobility status to optimal level  Description: INTERVENTIONS:  - Assess patient's baseline mobility status (ambulation, transfers, stairs, etc )    - Identify cognitive and physical deficits and behaviors that affect mobility  - Identify mobility aids required to assist with transfers and/or ambulation (gait belt, sit-to-stand, lift, walker, cane, etc )  - [place] fall precautions as indicated by assessment  - Record patient progress and toleration of activity level on Mobility SBAR; progress patient to next Phase/Stage  - Instruct patient to call for assistance with activity based on assessment  - Consider rehabilitation consult to assist with strengthening/weightbearing, etc   Outcome: Progressing     Problem: DISCHARGE PLANNING  Goal: Discharge to home or other facility with appropriate resources  Description: INTERVENTIONS:  - Identify barriers to discharge w/patient and caregiver  - Arrange for needed discharge resources and transportation as appropriate  - Identify discharge learning needs (meds, wound care, etc )  - Arrange for interpretive services to assist at discharge as needed  - Refer to Case Management Department for coordinating discharge planning if the patient needs post-hospital services based on physician/advanced practitioner order or complex needs related to functional status, cognitive ability, or social support system  Outcome: Progressing     Problem: Knowledge Deficit  Goal: Patient/family/caregiver demonstrates understanding of disease process, treatment plan, medications, and discharge instructions  Description: Complete learning assessment and assess knowledge base    Interventions:  - Provide teaching at level of understanding  - Provide teaching via preferred learning methods  Outcome: Progressing     Problem: GASTROINTESTINAL - ADULT  Goal: Minimal or absence of nausea and/or vomiting  Description: INTERVENTIONS:  - Administer IV fluids if ordered to ensure adequate hydration  -  -  - Administer ordered antiemetic medications as needed  - Provide nonpharmacologic comfort measures as appropriate  - Advance diet as tolerated, if ordered  - Consider nutrition services referral to assist patient with adequate nutrition and appropriate food choices  Outcome: Progressing  Goal: Maintains or returns to baseline bowel function  Description: INTERVENTIONS:  - Assess bowel function  - Encourage oral fluids to ensure adequate hydration  - Administer IV fluids if ordered to ensure adequate hydration  - Administer ordered medications as needed  - Encourage mobilization and activity  - Consider nutritional services referral to assist patient with adequate nutrition and appropriate food choices  Outcome: Progressing  Goal: Maintains adequate nutritional intake  Description: INTERVENTIONS:  - Monitor percentage of each meal consumed  - Identify factors contributing to decreased intake, treat as appropriate  - Assist with meals as needed  - Monitor I&O, weight, and lab values if indicated  - Obtain nutrition services referral as needed  Outcome: Progressing     Problem: METABOLIC, FLUID AND ELECTROLYTES - ADULT  Goal: Electrolytes maintained within normal limits  Description: INTERVENTIONS:  - Monitor labs and assess patient for signs and symptoms of electrolyte imbalances, potassium levels  - Administer electrolyte replacement as ordered  - Monitor response to electrolyte replacements, including repeat lab results as appropriate  - Instruct patient on fluid and nutrition as appropriate  Outcome: Progressing  Goal: Fluid balance maintained  Description: INTERVENTIONS:  - Monitor labs   - Monitor I/O and WT  - Instruct patient on fluid and nutrition as appropriate  - Assess for signs & symptoms of volume excess or deficit  Outcome: Progressing

## 2020-09-20 NOTE — ASSESSMENT & PLAN NOTE
· Recurrent severe hypokalemia thought to be due gastric bypass; persistence due to poor po intake  · Poor po intake 2/2 persistent severe left lower quadrant abdominal pain which has currently been improving since stopping p o   Potassium  · Follows outpatient with Nephrology; consulted during this admission as well; recommendations appreciated  · Magnesium level has remained within normal limits  · Was originally on po potassium however persistence of abdominal pain possibly secondary to repeated high doses of po potassium  · Discontinued po potassium as per Nephrology recommendations, will supplement K via IV for now and will continue to monitor response  · It is potassium today at 3 8 will discharge patient today with prescriptions for IV potassium via [facility]

## 2020-09-20 NOTE — ASSESSMENT & PLAN NOTE
· Chronic malabsorption with acute on chronic hypokalemia  · Had exploratory laparoscopy 09/02/2020-unable to find results in Care everywhere  · Referred patient to GI as outpatient

## 2020-09-20 NOTE — ASSESSMENT & PLAN NOTE
· Patient with persistent LLQ pain, worse with meals  · Due to severe LLQ pain, patient unable to tolerate po, contributing to persistent hypokalemia  · Previous workup unremarkable (CT abdomen, colonoscopy, EGD, exploratory laparotomy)  · GI recommendations appreciated, will assess patient's condition over the weekend to see if patient responds to discontinuation of po K and topiramate; if with improvement, will plan for additional recommended exams to be done as outpatient  · Patient's abdominal pain progressively improving since stopping p o   Potassium

## 2020-09-20 NOTE — NURSING NOTE
Patient ambulated to rear doors (room 324 and back twice today) refusing lovenox, physician , dr Annabelle Tong aware explained contradictions refusing

## 2020-09-20 NOTE — PROGRESS NOTES
Progress Note - Nephrology   Femi Valiente 38 y o  female MRN: [id number]  Unit/Bed#: -01 Encounter: [id number]    A/P:  1  Hypokalemia  Potassium is risen to 3 8 with both IV restitution as well as oral   She will continue IV infusion at home with 80 mEq daily  She will have a blood checked twice a week  2  Left lower quadrant discomfort  Continues in spite of discontinuing oral potassium and Topiramate  Since stopping oral potassium the pain is less severe  She has had a CT scan con colonoscopy, EGD an exploratory laparoscopy by her bariatric surgeon which were negative  3  Hypophosphatemia  Will give 1 dose of sodium phosphate orally prior to discharge   4  Chronic anemia  Continue iron and watch her hemoglobin  She will follow up in the outpatient setting in our [facility] office next month       Follow up reason for today's visit: Hypokalemia    Hypokalemia    Patient Active Problem List   Diagnosis    Hypokalemia    History of gastric bypass    Migraine without aura and without status migrainosus, not intractable    Left-sided weakness    Hypophosphatemia    Chronic anemia    Vitamin D deficiency    Elevated CPK    Vitamin B12 deficiency    Cervical lymphadenopathy    Fatigue    Paresthesia of both lower extremities    Paresthesias    B12 deficiency    Gastric bypass status for obesity    GERD (gastroesophageal reflux disease)    Migraine    WAGNER (obstructive sleep apnea)    Other intestinal malabsorption    Acute kidney injury ([address] )    Right ovarian cyst    Chest pain    Nausea & vomiting    Vertigo    Stress fracture of right foot with routine healing    Normal anion gap metabolic acidosis    Middle ear effusion    Left lower quadrant abdominal pain       Subjective:   Still has left lower quad pain but able to et meals  Has Zofran ordered for nausea    Objective:     Vitals: Blood pressure 111/64, pulse 92, temperature 98 1 °F (36 7 °C), resp   rate 18, height 5' 3" (1 6 m), weight 64 kg (141 lb 1 5 oz), SpO2 100 %  ,Body mass index is 24 99 kg/m²  Weight (last 2 days)     Date/Time   Weight    09/20/20 0600   64 (141 09)    09/19/20 0600   63 5 (140)    09/19/20 0529   63 5 (140)    09/18/20 0600   65 2 (143 74)                Intake/Output Summary (Last 24 hours) at 9/20/2020 1410  Last data filed at 9/20/2020 1300  Gross per 24 hour   Intake 1180 ml   Output    Net 1180 ml     I/O last 3 completed shifts: In: 1180 [P O :480; I V :600; IV Piggyback:100]  Out: -          Physical Exam: /64   Pulse 92   Temp 98 1 °F (36 7 °C)   Resp 18   Ht 5' 3" (1 6 m)   Wt 64 kg (141 lb 1 5 oz)   LMP  (LMP Unknown)   SpO2 100%   BMI 24 99 kg/m²     General Appearance:    Alert, cooperative, no distress, appears stated age   Head:    Normocephalic, without obvious abnormality, atraumatic   Eyes:    Conjunctiva/corneas clear   Ears:    Normal external ears   Nose:   Nares normal, septum midline, mucosa normal, no drainage    or sinus tenderness   Throat:   Lips, mucosa, and tongue normal; teeth and gums normal   Neck:   Supple, symmetrical, trachea midline, no adenopathy;        thyroid:  No enlargement/tenderness/nodules; no carotid    bruit or JVD   Back:     Symmetric, no curvature, ROM normal, no CVA tenderness   Lungs:     Clear to auscultation bilaterally, respirations unlabored   Chest wall:    No tenderness or deformity   Heart:    Regular rate and rhythm, S1 and S2 normal, no murmur, rub   or gallop   Abdomen:     Soft, non-tender, bowel sounds active   Extremities:   Extremities normal, atraumatic, no cyanosis or edema   Skin:   Skin color, texture, turgor normal, no rashes or lesions   Lymph nodes:   Cervical normal   Neurologic:   CNII-XII intact            Lab, Imaging and other studies: I have personally reviewed pertinent labs    CBC: No results found for: WBC, HGB, HCT, MCV, PLT, ADJUSTEDWBC, MCH, MCHC, RDW, MPV, NRBC  CMP:   Lab Results   Component Value Date    K 3 8 09/20/2020    K 3 8 09/20/2020     09/20/2020     09/20/2020    CO2 17 (L) 09/20/2020    CO2 17 (L) 09/20/2020    BUN 8 09/20/2020    BUN 7 09/20/2020    CREATININE 0 81 09/20/2020    CREATININE 0 84 09/20/2020    CALCIUM 8 0 (L) 09/20/2020    CALCIUM 7 8 (L) 09/20/2020     (H) 09/20/2020    ALT 76 09/20/2020    ALKPHOS 75 09/20/2020    EGFR 94 09/20/2020    EGFR 90 09/20/2020         Results from last 7 days   Lab Units 09/20/20  1155 09/19/20  2234 09/19/20  1308 09/19/20  0538  09/16/20  1904   POTASSIUM mmol/L 3 8  3 8 3 1* 3 4* 3 3*   < > 2 4*   CHLORIDE mmol/L 106  106 104 107 108   < > 108   CO2 mmol/L 17*  17* 17* 18* 19*   < > 19*   BUN mg/dL 8  7 8 6 6   < > 11   CREATININE mg/dL 0 81  0 84 0 84 0 88 0 89   < > 0 99   CALCIUM mg/dL 8 0*  7 8* 7 9* 8 0* 7 8*   < > 8 2*   ALK PHOS U/L 75  --   --  50  --  55   ALT U/L 76  --   --  24  --  29   AST U/L 128*  --   --  17  --  20    < > = values in this interval not displayed  Phosphorus:   Lab Results   Component Value Date    PHOS 2 6 (L) 09/20/2020     Magnesium:   Lab Results   Component Value Date    MG 1 9 09/20/2020    MG 1 9 09/20/2020     Urinalysis: No results found for: Hettie King, SPECGRAV, PHUR, LEUKOCYTESUR, NITRITE, PROTEINUA, GLUCOSEU, KETONESU, BILIRUBINUR, BLOODU  Ionized Calcium: No results found for: CAION  Coagulation: No results found for: PT, INR, APTT  Troponin: No results found for: TROPONINI  ABG: No results found for: PHART, FNC3YUB, PO2ART, ZPU9TYL, F0GQDKSZ, BEART, SOURCE  Radiology review:     IMAGING  No results found      Current Facility-Administered Medications   Medication Dose Route Frequency    acetaminophen (TYLENOL) tablet 650 mg  650 mg Oral Q6H PRN    AMILoride tablet 5 mg  5 mg Oral Daily    amitriptyline (ELAVIL) tablet 50 mg  50 mg Oral HS    cyclobenzaprine (FLEXERIL) tablet 10 mg  10 mg Oral TID    dextrose 5 % and sodium chloride 0 45 % with KCl 40 mEq/L infusion (premix)  75 mL/hr Intravenous Continuous    enoxaparin (LOVENOX) subcutaneous injection 40 mg  40 mg Subcutaneous Q24H [facility]    ferrous sulfate tablet 325 mg  325 mg Oral Every Other Day    HYDROmorphone (DILAUDID) injection 0 2 mg  0 2 mg Intravenous Q4H PRN    HYDROmorphone (DILAUDID) injection 0 5 mg  0 5 mg Intravenous Q4H PRN    ondansetron (ZOFRAN) injection 4 mg  4 mg Intravenous Q6H PRN    oxyCODONE (ROXICODONE) IR tablet 5 mg  5 mg Oral Q4H PRN    polyethylene glycol (MIRALAX) packet 17 g  17 g Oral Daily     Medications Discontinued During This Encounter   Medication Reason    mupirocin (BACTROBAN) 2 % ointment     potassium chloride 40 mEq IVPB (premix)     b complex-C-folic acid (NEPHRO-EDIE) 0 8 mg tablet     thiamine 100 MG tablet     zinc sulfate (ZINCATE) 220 mg capsule     SUMAtriptan (IMITREX) 100 mg tablet     potassium chloride 40 mEq IVPB (premix) Availability    potassium chloride 40 mEq IVPB (premix) Availability    topiramate (TOPAMAX) tablet 25 mg     potassium chloride oral solution 60 mEq     potassium chloride oral solution 40 mEq     sodium chloride 0 9 % infusion     potassium chloride 20 mEq IVPB (premix)     potassium chloride 20 mEq IVPB (premix)     potassium chloride (K-DUR,KLOR-CON) CR tablet 40 mEq     potassium chloride (K-DUR,KLOR-CON) CR tablet 40 mEq     potassium chloride 20 mEq IVPB (premix)     potassium chloride 40 mEq IVPB (premix) Availability    other medication, see sig, Reorder       Saray Friedman MD      This progress note was produced in part using a dictation device which may document imprecise wording from author's original intent

## 2020-09-23 ENCOUNTER — TELEPHONE (OUTPATIENT)
Dept: NEPHROLOGY | Facility: CLINIC | Age: 35
End: 2020-09-23

## 2020-09-23 DIAGNOSIS — E87.6 HYPOKALEMIA: ICD-10-CM

## 2020-09-23 RX ORDER — POTASSIUM CHLORIDE 400 MEQ/1000ML
80 INJECTION, SOLUTION INTRAVENOUS
Qty: 6000 ML | Refills: 5 | Status: SHIPPED | OUTPATIENT
Start: 2020-09-24 | End: 2020-09-24 | Stop reason: SDUPTHER

## 2020-09-23 RX ORDER — POTASSIUM CHLORIDE 400 MEQ/1000ML
80 INJECTION, SOLUTION INTRAVENOUS 3 TIMES WEEKLY
Qty: 6000 ML | Refills: 5 | Status: SHIPPED | OUTPATIENT
Start: 2020-09-23 | End: 2020-09-23

## 2020-09-23 NOTE — TELEPHONE ENCOUNTER
High printed, I will sign it here and have staff take care of it and [facility] ([facility] [place], [place])

## 2020-09-23 NOTE — TELEPHONE ENCOUNTER
Patient called stating when she was d/c from the hospital the new orders for her infusions were not sent to [facility]   Can you please put order in and PRINT it  It needs to be faxed to [facility] at  (fax) [phone]       She is using her last bag of 40 today

## 2020-09-23 NOTE — TELEPHONE ENCOUNTER
Call from patient  [facility] received order sent over for 80mEq 3x week  Patient thought when d/c from hospital they were also adding 100 4x week because she was not on anything else? If so, please enter order and Brit fax to Brenna at [phone]  Thanks

## 2020-09-24 ENCOUNTER — TELEPHONE (OUTPATIENT)
Dept: NEPHROLOGY | Facility: CLINIC | Age: 35
End: 2020-09-24

## 2020-09-24 DIAGNOSIS — E87.6 HYPOKALEMIA: ICD-10-CM

## 2020-09-24 RX ORDER — POTASSIUM CHLORIDE 400 MEQ/1000ML
80 INJECTION, SOLUTION INTRAVENOUS DAILY
Qty: 6000 ML | Refills: 5 | Status: ON HOLD | OUTPATIENT
Start: 2020-09-24 | End: 2020-09-30 | Stop reason: SDUPTHER

## 2020-09-24 NOTE — TELEPHONE ENCOUNTER
Please send over new script for potassium 100Meq 4X weekly to [facility]   They received the incorrect script

## 2020-09-24 NOTE — TELEPHONE ENCOUNTER
That's not the script that I want for her to have, its 80 mEq, this is double what she was on previously

## 2020-09-30 ENCOUNTER — APPOINTMENT (OUTPATIENT)
Dept: LAB | Facility: HOSPITAL | Age: 35
End: 2020-09-30
Attending: FAMILY MEDICINE
Payer: COMMERCIAL

## 2020-09-30 ENCOUNTER — HOSPITAL ENCOUNTER (OUTPATIENT)
Facility: HOSPITAL | Age: 35
Setting detail: OBSERVATION
LOS: 1 days | Discharge: HOME | End: 2020-10-01
Attending: FAMILY MEDICINE | Admitting: FAMILY MEDICINE
Payer: COMMERCIAL

## 2020-09-30 ENCOUNTER — TELEPHONE (OUTPATIENT)
Dept: NEPHROLOGY | Facility: CLINIC | Age: 35
End: 2020-09-30

## 2020-09-30 DIAGNOSIS — E87.6 HYPOKALEMIA: ICD-10-CM

## 2020-09-30 DIAGNOSIS — E87.6 HYPOKALEMIA: Primary | ICD-10-CM

## 2020-09-30 LAB — MAGNESIUM SERPL-MCNC: 1.9 MG/DL (ref 1.6–2.6)

## 2020-09-30 PROCEDURE — 83735 ASSAY OF MAGNESIUM: CPT | Performed by: FAMILY MEDICINE

## 2020-09-30 PROCEDURE — NC001 PR NO CHARGE: Performed by: INTERNAL MEDICINE

## 2020-09-30 PROCEDURE — 80048 BASIC METABOLIC PNL TOTAL CA: CPT

## 2020-09-30 PROCEDURE — G0379 DIRECT REFER HOSPITAL OBSERV: HCPCS

## 2020-09-30 PROCEDURE — 99220 PR INITIAL OBSERVATION CARE/DAY 70 MINUTES: CPT | Performed by: FAMILY MEDICINE

## 2020-09-30 RX ORDER — PANTOPRAZOLE SODIUM 40 MG/1
40 TABLET, DELAYED RELEASE ORAL DAILY
Status: DISCONTINUED | OUTPATIENT
Start: 2020-10-01 | End: 2020-10-01 | Stop reason: HOSPADM

## 2020-09-30 RX ORDER — AMITRIPTYLINE HYDROCHLORIDE 25 MG/1
50 TABLET, FILM COATED ORAL
Status: DISCONTINUED | OUTPATIENT
Start: 2020-09-30 | End: 2020-10-01 | Stop reason: HOSPADM

## 2020-09-30 RX ORDER — ACETAMINOPHEN 325 MG/1
650 TABLET ORAL EVERY 6 HOURS PRN
Status: DISCONTINUED | OUTPATIENT
Start: 2020-09-30 | End: 2020-10-01 | Stop reason: HOSPADM

## 2020-09-30 RX ORDER — POTASSIUM CHLORIDE 400 MEQ/1000ML
100 INJECTION, SOLUTION INTRAVENOUS DAILY
Qty: 7500 ML | Refills: 0 | Status: SHIPPED | OUTPATIENT
Start: 2020-09-30 | End: 2020-10-01 | Stop reason: SDUPTHER

## 2020-09-30 RX ORDER — ESCITALOPRAM OXALATE 10 MG/1
20 TABLET ORAL
COMMUNITY

## 2020-09-30 RX ORDER — AMILORIDE HYDROCHLORIDE 5 MG/1
5 TABLET ORAL DAILY
Status: DISCONTINUED | OUTPATIENT
Start: 2020-10-01 | End: 2020-10-01 | Stop reason: HOSPADM

## 2020-09-30 RX ORDER — POTASSIUM CHLORIDE 14.9 MG/ML
20 INJECTION INTRAVENOUS
Status: COMPLETED | OUTPATIENT
Start: 2020-09-30 | End: 2020-10-01

## 2020-09-30 RX ORDER — ESCITALOPRAM OXALATE 10 MG/1
10 TABLET ORAL
Status: DISCONTINUED | OUTPATIENT
Start: 2020-09-30 | End: 2020-10-01 | Stop reason: HOSPADM

## 2020-09-30 RX ADMIN — AMITRIPTYLINE HYDROCHLORIDE 50 MG: 25 TABLET, FILM COATED ORAL at 21:39

## 2020-09-30 RX ADMIN — POTASSIUM CHLORIDE 20 MEQ: 14.9 INJECTION, SOLUTION INTRAVENOUS at 20:01

## 2020-09-30 RX ADMIN — POTASSIUM CHLORIDE 20 MEQ: 14.9 INJECTION, SOLUTION INTRAVENOUS at 22:14

## 2020-09-30 RX ADMIN — ESCITALOPRAM OXALATE 10 MG: 10 TABLET ORAL at 21:39

## 2020-09-30 RX ADMIN — POTASSIUM CHLORIDE 20 MEQ: 14.9 INJECTION, SOLUTION INTRAVENOUS at 17:42

## 2020-10-01 VITALS
RESPIRATION RATE: 13 BRPM | HEART RATE: 80 BPM | WEIGHT: 139.55 LBS | BODY MASS INDEX: 24.73 KG/M2 | TEMPERATURE: 98.3 F | HEIGHT: 63 IN | DIASTOLIC BLOOD PRESSURE: 60 MMHG | OXYGEN SATURATION: 98 % | SYSTOLIC BLOOD PRESSURE: 101 MMHG

## 2020-10-01 LAB
ANION GAP SERPL CALCULATED.3IONS-SCNC: 11 MMOL/L (ref 4–13)
ANION GAP SERPL CALCULATED.3IONS-SCNC: 7 MMOL/L (ref 4–13)
ANION GAP SERPL CALCULATED.3IONS-SCNC: 9 MMOL/L (ref 4–13)
BUN SERPL-MCNC: 12 MG/DL (ref 5–25)
BUN SERPL-MCNC: 13 MG/DL (ref 5–25)
BUN SERPL-MCNC: 14 MG/DL (ref 5–25)
CALCIUM SERPL-MCNC: 7.8 MG/DL (ref 8.3–10.1)
CALCIUM SERPL-MCNC: 7.9 MG/DL (ref 8.3–10.1)
CALCIUM SERPL-MCNC: 8 MG/DL (ref 8.3–10.1)
CHLORIDE SERPL-SCNC: 107 MMOL/L (ref 100–108)
CHLORIDE SERPL-SCNC: 109 MMOL/L (ref 100–108)
CHLORIDE SERPL-SCNC: 112 MMOL/L (ref 100–108)
CO2 SERPL-SCNC: 18 MMOL/L (ref 21–32)
CO2 SERPL-SCNC: 18 MMOL/L (ref 21–32)
CO2 SERPL-SCNC: 19 MMOL/L (ref 21–32)
CREAT SERPL-MCNC: 0.87 MG/DL (ref 0.6–1.3)
CREAT SERPL-MCNC: 0.92 MG/DL (ref 0.6–1.3)
CREAT SERPL-MCNC: 0.95 MG/DL (ref 0.6–1.3)
ERYTHROCYTE [DISTWIDTH] IN BLOOD BY AUTOMATED COUNT: 17.4 % (ref 11.6–15.1)
GFR SERPL CREATININE-BSD FRML MDRD: 77 ML/MIN/1.73SQ M
GFR SERPL CREATININE-BSD FRML MDRD: 80 ML/MIN/1.73SQ M
GFR SERPL CREATININE-BSD FRML MDRD: 86 ML/MIN/1.73SQ M
GLUCOSE P FAST SERPL-MCNC: 74 MG/DL (ref 65–99)
GLUCOSE SERPL-MCNC: 105 MG/DL (ref 65–140)
GLUCOSE SERPL-MCNC: 74 MG/DL (ref 65–140)
GLUCOSE SERPL-MCNC: 88 MG/DL (ref 65–140)
HCT VFR BLD AUTO: 33.6 % (ref 34.8–46.1)
HGB BLD-MCNC: 10.8 G/DL (ref 11.5–15.4)
MCH RBC QN AUTO: 27.8 PG (ref 26.8–34.3)
MCHC RBC AUTO-ENTMCNC: 32.1 G/DL (ref 31.4–37.4)
MCV RBC AUTO: 86 FL (ref 82–98)
PLATELET # BLD AUTO: 257 THOUSANDS/UL (ref 149–390)
PMV BLD AUTO: 10.5 FL (ref 8.9–12.7)
POTASSIUM SERPL-SCNC: 3.6 MMOL/L (ref 3.5–5.3)
POTASSIUM SERPL-SCNC: 4 MMOL/L (ref 3.5–5.3)
POTASSIUM SERPL-SCNC: 4.4 MMOL/L (ref 3.5–5.3)
RBC # BLD AUTO: 3.89 MILLION/UL (ref 3.81–5.12)
SODIUM SERPL-SCNC: 136 MMOL/L (ref 136–145)
SODIUM SERPL-SCNC: 136 MMOL/L (ref 136–145)
SODIUM SERPL-SCNC: 138 MMOL/L (ref 136–145)
WBC # BLD AUTO: 5.48 THOUSAND/UL (ref 4.31–10.16)

## 2020-10-01 PROCEDURE — 85027 COMPLETE CBC AUTOMATED: CPT | Performed by: FAMILY MEDICINE

## 2020-10-01 PROCEDURE — 90686 IIV4 VACC NO PRSV 0.5 ML IM: CPT | Performed by: FAMILY MEDICINE

## 2020-10-01 PROCEDURE — 99244 OFF/OP CNSLTJ NEW/EST MOD 40: CPT | Performed by: INTERNAL MEDICINE

## 2020-10-01 PROCEDURE — 90471 IMMUNIZATION ADMIN: CPT | Performed by: FAMILY MEDICINE

## 2020-10-01 PROCEDURE — 80048 BASIC METABOLIC PNL TOTAL CA: CPT | Performed by: INTERNAL MEDICINE

## 2020-10-01 PROCEDURE — 99217 PR OBSERVATION CARE DISCHARGE MANAGEMENT: CPT | Performed by: FAMILY MEDICINE

## 2020-10-01 PROCEDURE — 80048 BASIC METABOLIC PNL TOTAL CA: CPT | Performed by: FAMILY MEDICINE

## 2020-10-01 RX ORDER — POTASSIUM CHLORIDE 29.8 MG/ML
40 INJECTION INTRAVENOUS ONCE
Status: COMPLETED | OUTPATIENT
Start: 2020-10-01 | End: 2020-10-01

## 2020-10-01 RX ORDER — POTASSIUM CHLORIDE 400 MEQ/1000ML
80 INJECTION, SOLUTION INTRAVENOUS DAILY
Qty: 2800 ML | Refills: 0 | Status: SHIPPED | OUTPATIENT
Start: 2020-10-01 | End: 2020-10-19 | Stop reason: SDUPTHER

## 2020-10-01 RX ORDER — POTASSIUM CHLORIDE 14.9 MG/ML
20 INJECTION INTRAVENOUS ONCE
Status: DISCONTINUED | OUTPATIENT
Start: 2020-10-01 | End: 2020-10-01

## 2020-10-01 RX ORDER — POTASSIUM CHLORIDE 14.9 MG/ML
20 INJECTION INTRAVENOUS
Status: COMPLETED | OUTPATIENT
Start: 2020-10-01 | End: 2020-10-01

## 2020-10-01 RX ADMIN — POTASSIUM CHLORIDE 20 MEQ: 14.9 INJECTION, SOLUTION INTRAVENOUS at 00:25

## 2020-10-01 RX ADMIN — AMILORIDE HYDROCHLORIDE 5 MG: 5 TABLET ORAL at 08:39

## 2020-10-01 RX ADMIN — INFLUENZA VIRUS VACCINE 0.5 ML: 15; 15; 15; 15 SUSPENSION INTRAMUSCULAR at 15:01

## 2020-10-01 RX ADMIN — POTASSIUM CHLORIDE 40 MEQ: 29.8 INJECTION, SOLUTION INTRAVENOUS at 15:54

## 2020-10-01 RX ADMIN — POTASSIUM CHLORIDE 20 MEQ: 14.9 INJECTION, SOLUTION INTRAVENOUS at 11:48

## 2020-10-01 RX ADMIN — POTASSIUM CHLORIDE 20 MEQ: 14.9 INJECTION, SOLUTION INTRAVENOUS at 09:56

## 2020-10-01 RX ADMIN — POTASSIUM CHLORIDE 20 MEQ: 14.9 INJECTION, SOLUTION INTRAVENOUS at 02:37

## 2020-10-07 ENCOUNTER — APPOINTMENT (OUTPATIENT)
Dept: LAB | Facility: HOSPITAL | Age: 35
End: 2020-10-07
Attending: FAMILY MEDICINE
Payer: COMMERCIAL

## 2020-10-07 DIAGNOSIS — E87.6 HYPOKALEMIA: ICD-10-CM

## 2020-10-13 ENCOUNTER — APPOINTMENT (OUTPATIENT)
Dept: LAB | Facility: HOSPITAL | Age: 35
End: 2020-10-13
Attending: FAMILY MEDICINE
Payer: COMMERCIAL

## 2020-10-13 DIAGNOSIS — E87.6 HYPOKALEMIA: ICD-10-CM

## 2020-10-19 DIAGNOSIS — E87.6 HYPOKALEMIA: ICD-10-CM

## 2020-10-19 RX ORDER — POTASSIUM CHLORIDE 400 MEQ/1000ML
80 INJECTION, SOLUTION INTRAVENOUS DAILY
Qty: 2800 ML | Refills: 5 | Status: SHIPPED | OUTPATIENT
Start: 2020-10-19 | End: 2020-12-03 | Stop reason: SDUPTHER

## 2020-10-21 ENCOUNTER — APPOINTMENT (OUTPATIENT)
Dept: LAB | Facility: HOSPITAL | Age: 35
End: 2020-10-21
Attending: INTERNAL MEDICINE
Payer: COMMERCIAL

## 2020-10-27 ENCOUNTER — APPOINTMENT (OUTPATIENT)
Dept: LAB | Facility: HOSPITAL | Age: 35
End: 2020-10-27
Attending: INTERNAL MEDICINE
Payer: COMMERCIAL

## 2020-10-30 ENCOUNTER — OFFICE VISIT (OUTPATIENT)
Dept: NEPHROLOGY | Facility: CLINIC | Age: 35
End: 2020-10-30
Payer: COMMERCIAL

## 2020-10-30 VITALS
DIASTOLIC BLOOD PRESSURE: 58 MMHG | TEMPERATURE: 96.1 F | WEIGHT: 142 LBS | OXYGEN SATURATION: 99 % | HEART RATE: 100 BPM | SYSTOLIC BLOOD PRESSURE: 90 MMHG | BODY MASS INDEX: 25.16 KG/M2 | HEIGHT: 63 IN

## 2020-10-30 DIAGNOSIS — R53.83 FATIGUE, UNSPECIFIED TYPE: ICD-10-CM

## 2020-10-30 DIAGNOSIS — Z98.84 GASTRIC BYPASS STATUS FOR OBESITY: ICD-10-CM

## 2020-10-30 DIAGNOSIS — E87.6 HYPOKALEMIA: Primary | ICD-10-CM

## 2020-10-30 DIAGNOSIS — R20.2 PARESTHESIAS: ICD-10-CM

## 2020-10-30 PROBLEM — N17.9 ACUTE KIDNEY INJURY (HCC): Status: RESOLVED | Noted: 2018-12-18 | Resolved: 2020-10-30

## 2020-10-30 PROCEDURE — 99244 OFF/OP CNSLTJ NEW/EST MOD 40: CPT | Performed by: INTERNAL MEDICINE

## 2020-10-30 RX ORDER — MAGNESIUM OXIDE 400 MG/1
400 TABLET ORAL
COMMUNITY
Start: 2020-07-24 | End: 2021-07-24

## 2020-10-30 RX ORDER — ERGOCALCIFEROL (VITAMIN D2) 1250 MCG
50000 CAPSULE ORAL 2 TIMES WEEKLY
COMMUNITY
End: 2021-03-26

## 2020-10-30 RX ORDER — FERROUS SULFATE 325(65) MG
65 TABLET ORAL EVERY OTHER DAY
COMMUNITY
End: 2021-03-26

## 2020-11-04 ENCOUNTER — APPOINTMENT (OUTPATIENT)
Dept: LAB | Facility: HOSPITAL | Age: 35
End: 2020-11-04
Attending: INTERNAL MEDICINE
Payer: COMMERCIAL

## 2020-11-12 ENCOUNTER — APPOINTMENT (OUTPATIENT)
Dept: LAB | Facility: HOSPITAL | Age: 35
End: 2020-11-12
Attending: INTERNAL MEDICINE
Payer: COMMERCIAL

## 2020-11-12 NOTE — ASSESSMENT & PLAN NOTE
Bry Vail is a 49 year old male presenting with COVID exposure on 10/29 from wife.  Pt had fever at the time, but didn't get tested.  Pt reports intermittent chest congestion, shortness of breath and fatigue x 4 days.      Medications verified with patients assistance  ALLERGIES:  No Known Allergies  Tomer Fournier MD    Pharmacy verified    Patient would like communication of their results via:        Cell Phone:   Telephone Information:   Mobile [phone]     Okay to leave a message containing results? Yes     RN wore mask, face shield, gloves and gown while in room with patient.    · Patient with chronic hypokalemia with numbness and tingling on her lips and finger tips  Clinically is improving  · Will monitor on tele  · Continue potassium supplementation- will continue with 40 mEq 5 times daily at home on discharge  · Nephrology input appreciated  · Outpatient follow-up with Nephrology  The patient should be eligible be evaluated by Yaneth Bassett at diagnostic dilemma department to identify cause of her hypokalemia

## 2020-11-28 ENCOUNTER — APPOINTMENT (EMERGENCY)
Dept: CT IMAGING | Facility: HOSPITAL | Age: 35
End: 2020-11-28
Payer: COMMERCIAL

## 2020-11-28 ENCOUNTER — HOSPITAL ENCOUNTER (EMERGENCY)
Facility: HOSPITAL | Age: 35
Discharge: HOME | End: 2020-11-28
Attending: EMERGENCY MEDICINE
Payer: COMMERCIAL

## 2020-11-28 VITALS
HEIGHT: 63 IN | WEIGHT: 144.84 LBS | RESPIRATION RATE: 18 BRPM | BODY MASS INDEX: 25.66 KG/M2 | SYSTOLIC BLOOD PRESSURE: 135 MMHG | OXYGEN SATURATION: 100 % | DIASTOLIC BLOOD PRESSURE: 83 MMHG | HEART RATE: 80 BPM

## 2020-11-28 DIAGNOSIS — S09.90XA INJURY OF HEAD, INITIAL ENCOUNTER: Primary | ICD-10-CM

## 2020-11-28 DIAGNOSIS — S01.21XA NASAL LACERATION, INITIAL ENCOUNTER: ICD-10-CM

## 2020-11-28 DIAGNOSIS — S06.0X0A CONCUSSION WITHOUT LOSS OF CONSCIOUSNESS, INITIAL ENCOUNTER: ICD-10-CM

## 2020-11-28 PROCEDURE — 70450 CT HEAD/BRAIN W/O DYE: CPT

## 2020-11-28 PROCEDURE — 12011 RPR F/E/E/N/L/M 2.5 CM/<: CPT | Performed by: EMERGENCY MEDICINE

## 2020-11-28 PROCEDURE — 70486 CT MAXILLOFACIAL W/O DYE: CPT

## 2020-11-28 PROCEDURE — 99284 EMERGENCY DEPT VISIT MOD MDM: CPT

## 2020-11-28 PROCEDURE — 99284 EMERGENCY DEPT VISIT MOD MDM: CPT | Performed by: EMERGENCY MEDICINE

## 2020-11-28 PROCEDURE — G1004 CDSM NDSC: HCPCS

## 2020-12-03 DIAGNOSIS — E87.6 HYPOKALEMIA: ICD-10-CM

## 2020-12-03 RX ORDER — POTASSIUM CHLORIDE 400 MEQ/1000ML
80 INJECTION, SOLUTION INTRAVENOUS DAILY
Qty: 2800 ML | Refills: 5 | Status: SHIPPED | OUTPATIENT
Start: 2020-12-03 | End: 2021-01-14 | Stop reason: SDUPTHER

## 2020-12-03 NOTE — UTILIZATION REVIEW
Clinical sent to wrong fax numbers for 09/20/20 admission  Below is verification that faxes were sent timely  Requesting Nurse review of case  Notification of Inpatient Admission/Inpatient Authorization Request   This is a Notification of Inpatient Admission for Cos[address]  Be advised that this patient was admitted to our facility under Inpatient Status  Contact Melisa Garcia at [phone] for additional admission information  [facility]  DEPT  DEDICATED [phone]  Patient Name:   Donato Burgess   YOB: 1985       [address]   [address]:   [address]  Tax ID: [id number]  NPI: [id number] Attending Provider/NPI:  Phone:  Address: Aida Brumfield, Opal Hsu [[id number]]  [phone]  SAME AS FACILITY   Place of Service Code: 24 Place of Service Name:  [address] [place] [address]   Start Date: 9/17/20 1638     Discharge Date & Time: 9/20/2020  6:44 PM    Type of Admission: Inpatient Status Discharge Disposition (if discharged): Home/Self Care   Patient Diagnoses: Hypokalemia [E87 6]  Abnormal labor [O62 9]     Orders: Admission Orders (From admission, onward)     Ordered        09/17/20 1638  Inpatient Admission  Once         09/16/20 1951  Place in Observation  Once                    Assigned Utilization Review Contact: Melisa Garcia  Utilization   Network Utilization Review Department  Phone: [phone]; Fax [phone]  Email: [email or web address]   ATTENTION PAYERS: Please call the assigned Utilization  directly with any questions or concerns ALL voicemails in the department are confidential  Send all requests for admission clinical reviews, approved or denied determinations and any other requests to dedicated fax number belonging to the campus where the patient is receiving treatment

## 2021-01-05 ENCOUNTER — LAB (OUTPATIENT)
Dept: LAB | Facility: HOSPITAL | Age: 36
End: 2021-01-05
Attending: INTERNAL MEDICINE
Payer: COMMERCIAL

## 2021-01-14 DIAGNOSIS — E87.6 HYPOKALEMIA: ICD-10-CM

## 2021-01-14 RX ORDER — POTASSIUM CHLORIDE 400 MEQ/1000ML
80 INJECTION, SOLUTION INTRAVENOUS DAILY
Qty: 2800 ML | Refills: 12 | Status: SHIPPED | OUTPATIENT
Start: 2021-01-14 | End: 2021-05-11 | Stop reason: SDUPTHER

## 2021-02-17 ENCOUNTER — TRANSCRIBE ORDERS (OUTPATIENT)
Dept: ADMINISTRATIVE | Facility: HOSPITAL | Age: 36
End: 2021-02-17

## 2021-02-17 DIAGNOSIS — E87.6 HYPOKALEMIA: Primary | ICD-10-CM

## 2021-02-19 ENCOUNTER — APPOINTMENT (OUTPATIENT)
Dept: LAB | Facility: HOSPITAL | Age: 36
End: 2021-02-19
Attending: INTERNAL MEDICINE
Payer: COMMERCIAL

## 2021-02-19 ENCOUNTER — HOSPITAL ENCOUNTER (INPATIENT)
Facility: HOSPITAL | Age: 36
LOS: 1 days | Discharge: HOME | DRG: 641 | End: 2021-02-20
Attending: INTERNAL MEDICINE | Admitting: INTERNAL MEDICINE
Payer: COMMERCIAL

## 2021-02-19 ENCOUNTER — TELEPHONE (OUTPATIENT)
Dept: NEPHROLOGY | Facility: CLINIC | Age: 36
End: 2021-02-19

## 2021-02-19 DIAGNOSIS — E87.6 HYPOKALEMIA: ICD-10-CM

## 2021-02-19 DIAGNOSIS — E87.6 HYPOKALEMIA: Primary | ICD-10-CM

## 2021-02-19 LAB
ANION GAP SERPL CALCULATED.3IONS-SCNC: 14 MMOL/L (ref 4–13)
ANION GAP SERPL CALCULATED.3IONS-SCNC: 14 MMOL/L (ref 4–13)
BUN SERPL-MCNC: 11 MG/DL (ref 5–25)
BUN SERPL-MCNC: 11 MG/DL (ref 5–25)
CALCIUM SERPL-MCNC: 8 MG/DL (ref 8.3–10.1)
CALCIUM SERPL-MCNC: 8.4 MG/DL (ref 8.3–10.1)
CHLORIDE SERPL-SCNC: 104 MMOL/L (ref 100–108)
CHLORIDE SERPL-SCNC: 105 MMOL/L (ref 100–108)
CHOLEST SERPL-MCNC: 161 MG/DL (ref 50–200)
CO2 SERPL-SCNC: 15 MMOL/L (ref 21–32)
CO2 SERPL-SCNC: 18 MMOL/L (ref 21–32)
CREAT SERPL-MCNC: 1 MG/DL (ref 0.6–1.3)
CREAT SERPL-MCNC: 1.18 MG/DL (ref 0.6–1.3)
CREAT UR-MCNC: 331 MG/DL
ERYTHROCYTE [DISTWIDTH] IN BLOOD BY AUTOMATED COUNT: 15.2 % (ref 11.6–15.1)
EST. AVERAGE GLUCOSE BLD GHB EST-MCNC: 103 MG/DL
GFR SERPL CREATININE-BSD FRML MDRD: 59 ML/MIN/1.73SQ M
GFR SERPL CREATININE-BSD FRML MDRD: 72 ML/MIN/1.73SQ M
GLUCOSE P FAST SERPL-MCNC: 156 MG/DL (ref 65–99)
GLUCOSE SERPL-MCNC: 84 MG/DL (ref 65–140)
HBA1C MFR BLD: 5.2 %
HCT VFR BLD AUTO: 35 % (ref 34.8–46.1)
HDLC SERPL-MCNC: 70 MG/DL
HGB BLD-MCNC: 10.6 G/DL (ref 11.5–15.4)
LDLC SERPL CALC-MCNC: 70 MG/DL (ref 0–100)
MAGNESIUM SERPL-MCNC: 2.1 MG/DL (ref 1.6–2.6)
MCH RBC QN AUTO: 25.5 PG (ref 26.8–34.3)
MCHC RBC AUTO-ENTMCNC: 30.3 G/DL (ref 31.4–37.4)
MCV RBC AUTO: 84 FL (ref 82–98)
MICROALBUMIN UR-MCNC: 73.1 MG/L (ref 0–20)
MICROALBUMIN/CREAT 24H UR: 22 MG/G CREATININE (ref 0–30)
NONHDLC SERPL-MCNC: 91 MG/DL
PLATELET # BLD AUTO: 285 THOUSANDS/UL (ref 149–390)
PMV BLD AUTO: 11.2 FL (ref 8.9–12.7)
POTASSIUM SERPL-SCNC: 3 MMOL/L (ref 3.5–5.3)
POTASSIUM SERPL-SCNC: 3 MMOL/L (ref 3.5–5.3)
RBC # BLD AUTO: 4.15 MILLION/UL (ref 3.81–5.12)
SODIUM SERPL-SCNC: 134 MMOL/L (ref 136–145)
SODIUM SERPL-SCNC: 136 MMOL/L (ref 136–145)
TRIGL SERPL-MCNC: 107 MG/DL
WBC # BLD AUTO: 8.2 THOUSAND/UL (ref 4.31–10.16)

## 2021-02-19 PROCEDURE — 82043 UR ALBUMIN QUANTITATIVE: CPT | Performed by: FAMILY MEDICINE

## 2021-02-19 PROCEDURE — 80048 BASIC METABOLIC PNL TOTAL CA: CPT

## 2021-02-19 PROCEDURE — 80048 BASIC METABOLIC PNL TOTAL CA: CPT | Performed by: PHYSICIAN ASSISTANT

## 2021-02-19 PROCEDURE — 99222 1ST HOSP IP/OBS MODERATE 55: CPT | Performed by: PHYSICIAN ASSISTANT

## 2021-02-19 PROCEDURE — 83735 ASSAY OF MAGNESIUM: CPT | Performed by: PHYSICIAN ASSISTANT

## 2021-02-19 PROCEDURE — 82607 VITAMIN B-12: CPT | Performed by: PHYSICIAN ASSISTANT

## 2021-02-19 PROCEDURE — 85027 COMPLETE CBC AUTOMATED: CPT | Performed by: PHYSICIAN ASSISTANT

## 2021-02-19 PROCEDURE — 36415 COLL VENOUS BLD VENIPUNCTURE: CPT

## 2021-02-19 PROCEDURE — 83036 HEMOGLOBIN GLYCOSYLATED A1C: CPT

## 2021-02-19 PROCEDURE — 82570 ASSAY OF URINE CREATININE: CPT | Performed by: FAMILY MEDICINE

## 2021-02-19 PROCEDURE — 80061 LIPID PANEL: CPT

## 2021-02-19 PROCEDURE — 93005 ELECTROCARDIOGRAM TRACING: CPT

## 2021-02-19 RX ORDER — ACETAMINOPHEN 325 MG/1
650 TABLET ORAL EVERY 6 HOURS PRN
Status: DISCONTINUED | OUTPATIENT
Start: 2021-02-19 | End: 2021-02-20 | Stop reason: HOSPADM

## 2021-02-19 RX ORDER — AMILORIDE HYDROCHLORIDE 5 MG/1
5 TABLET ORAL DAILY
Status: DISCONTINUED | OUTPATIENT
Start: 2021-02-20 | End: 2021-02-20 | Stop reason: HOSPADM

## 2021-02-19 RX ORDER — FERROUS SULFATE 325(65) MG
325 TABLET ORAL EVERY OTHER DAY
Status: DISCONTINUED | OUTPATIENT
Start: 2021-02-20 | End: 2021-02-20 | Stop reason: HOSPADM

## 2021-02-19 RX ORDER — CYCLOBENZAPRINE HCL 10 MG
10 TABLET ORAL 3 TIMES DAILY
Status: DISCONTINUED | OUTPATIENT
Start: 2021-02-19 | End: 2021-02-20 | Stop reason: HOSPADM

## 2021-02-19 RX ORDER — ONDANSETRON 2 MG/ML
4 INJECTION INTRAMUSCULAR; INTRAVENOUS EVERY 6 HOURS PRN
Status: DISCONTINUED | OUTPATIENT
Start: 2021-02-19 | End: 2021-02-20 | Stop reason: HOSPADM

## 2021-02-19 RX ORDER — POTASSIUM CHLORIDE 14.9 MG/ML
20 INJECTION INTRAVENOUS ONCE
Status: COMPLETED | OUTPATIENT
Start: 2021-02-19 | End: 2021-02-20

## 2021-02-19 RX ORDER — ESCITALOPRAM OXALATE 10 MG/1
20 TABLET ORAL
Status: DISCONTINUED | OUTPATIENT
Start: 2021-02-19 | End: 2021-02-20 | Stop reason: HOSPADM

## 2021-02-19 RX ORDER — AMITRIPTYLINE HYDROCHLORIDE 50 MG/1
50 TABLET, FILM COATED ORAL
Status: DISCONTINUED | OUTPATIENT
Start: 2021-02-19 | End: 2021-02-20 | Stop reason: HOSPADM

## 2021-02-19 RX ORDER — B-COMPLEX WITH VITAMIN C
1 TABLET ORAL
Status: DISCONTINUED | OUTPATIENT
Start: 2021-02-20 | End: 2021-02-19 | Stop reason: RX

## 2021-02-19 RX ORDER — HEPARIN SODIUM 5000 [USP'U]/ML
5000 INJECTION, SOLUTION INTRAVENOUS; SUBCUTANEOUS EVERY 8 HOURS SCHEDULED
Status: DISCONTINUED | OUTPATIENT
Start: 2021-02-19 | End: 2021-02-20 | Stop reason: HOSPADM

## 2021-02-19 RX ORDER — SODIUM CHLORIDE 9 MG/ML
100 INJECTION, SOLUTION INTRAVENOUS CONTINUOUS
Status: DISCONTINUED | OUTPATIENT
Start: 2021-02-19 | End: 2021-02-20 | Stop reason: HOSPADM

## 2021-02-19 RX ADMIN — ESCITALOPRAM OXALATE 20 MG: 10 TABLET ORAL at 22:43

## 2021-02-19 RX ADMIN — POTASSIUM CHLORIDE 20 MEQ: 14.9 INJECTION, SOLUTION INTRAVENOUS at 22:45

## 2021-02-19 RX ADMIN — SODIUM CHLORIDE 100 ML/HR: 0.9 INJECTION, SOLUTION INTRAVENOUS at 22:18

## 2021-02-19 RX ADMIN — AMITRIPTYLINE HYDROCHLORIDE 50 MG: 50 TABLET, FILM COATED ORAL at 22:43

## 2021-02-19 RX ADMIN — CYCLOBENZAPRINE HYDROCHLORIDE 10 MG: 10 TABLET, FILM COATED ORAL at 22:43

## 2021-02-20 VITALS
TEMPERATURE: 97.7 F | WEIGHT: 132.28 LBS | BODY MASS INDEX: 23.44 KG/M2 | OXYGEN SATURATION: 98 % | DIASTOLIC BLOOD PRESSURE: 55 MMHG | HEIGHT: 63 IN | HEART RATE: 75 BPM | SYSTOLIC BLOOD PRESSURE: 100 MMHG | RESPIRATION RATE: 16 BRPM

## 2021-02-20 LAB
ALBUMIN SERPL BCP-MCNC: 3.2 G/DL (ref 3.5–5)
ALP SERPL-CCNC: 49 U/L (ref 46–116)
ALT SERPL W P-5'-P-CCNC: 16 U/L (ref 12–78)
ANION GAP SERPL CALCULATED.3IONS-SCNC: 10 MMOL/L (ref 4–13)
ANION GAP SERPL CALCULATED.3IONS-SCNC: 13 MMOL/L (ref 4–13)
AST SERPL W P-5'-P-CCNC: 18 U/L (ref 5–45)
BILIRUB SERPL-MCNC: 0.2 MG/DL (ref 0.2–1)
BILIRUB UR QL STRIP: NEGATIVE
BUN SERPL-MCNC: 11 MG/DL (ref 5–25)
BUN SERPL-MCNC: 8 MG/DL (ref 5–25)
CALCIUM ALBUM COR SERPL-MCNC: 8.8 MG/DL (ref 8.3–10.1)
CALCIUM SERPL-MCNC: 7.8 MG/DL (ref 8.3–10.1)
CALCIUM SERPL-MCNC: 8.2 MG/DL (ref 8.3–10.1)
CHLORIDE SERPL-SCNC: 107 MMOL/L (ref 100–108)
CHLORIDE SERPL-SCNC: 109 MMOL/L (ref 100–108)
CLARITY UR: CLEAR
CO2 SERPL-SCNC: 16 MMOL/L (ref 21–32)
CO2 SERPL-SCNC: 18 MMOL/L (ref 21–32)
COLOR UR: YELLOW
CREAT SERPL-MCNC: 0.98 MG/DL (ref 0.6–1.3)
CREAT SERPL-MCNC: 0.99 MG/DL (ref 0.6–1.3)
ERYTHROCYTE [DISTWIDTH] IN BLOOD BY AUTOMATED COUNT: 15.2 % (ref 11.6–15.1)
GFR SERPL CREATININE-BSD FRML MDRD: 73 ML/MIN/1.73SQ M
GFR SERPL CREATININE-BSD FRML MDRD: 74 ML/MIN/1.73SQ M
GLUCOSE SERPL-MCNC: 77 MG/DL (ref 65–140)
GLUCOSE SERPL-MCNC: 86 MG/DL (ref 65–140)
GLUCOSE UR STRIP-MCNC: NEGATIVE MG/DL
HCT VFR BLD AUTO: 32.7 % (ref 34.8–46.1)
HGB BLD-MCNC: 10 G/DL (ref 11.5–15.4)
HGB UR QL STRIP.AUTO: NEGATIVE
KETONES UR STRIP-MCNC: NEGATIVE MG/DL
LEUKOCYTE ESTERASE UR QL STRIP: NEGATIVE
MAGNESIUM SERPL-MCNC: 2.1 MG/DL (ref 1.6–2.6)
MCH RBC QN AUTO: 26 PG (ref 26.8–34.3)
MCHC RBC AUTO-ENTMCNC: 30.6 G/DL (ref 31.4–37.4)
MCV RBC AUTO: 85 FL (ref 82–98)
NITRITE UR QL STRIP: NEGATIVE
PH UR STRIP.AUTO: 6.5 [PH]
PHOSPHATE SERPL-MCNC: 4.5 MG/DL (ref 2.7–4.5)
PLATELET # BLD AUTO: 297 THOUSANDS/UL (ref 149–390)
PMV BLD AUTO: 11.2 FL (ref 8.9–12.7)
POTASSIUM SERPL-SCNC: 3.3 MMOL/L (ref 3.5–5.3)
POTASSIUM SERPL-SCNC: 3.6 MMOL/L (ref 3.5–5.3)
PROT SERPL-MCNC: 6.3 G/DL (ref 6.4–8.2)
PROT UR STRIP-MCNC: NEGATIVE MG/DL
RBC # BLD AUTO: 3.85 MILLION/UL (ref 3.81–5.12)
SODIUM SERPL-SCNC: 136 MMOL/L (ref 136–145)
SODIUM SERPL-SCNC: 137 MMOL/L (ref 136–145)
SP GR UR STRIP.AUTO: 1.01 (ref 1–1.03)
UROBILINOGEN UR QL STRIP.AUTO: 0.2 E.U./DL
VIT B12 SERPL-MCNC: 152 PG/ML (ref 100–900)
WBC # BLD AUTO: 5.95 THOUSAND/UL (ref 4.31–10.16)

## 2021-02-20 PROCEDURE — 80053 COMPREHEN METABOLIC PANEL: CPT | Performed by: PHYSICIAN ASSISTANT

## 2021-02-20 PROCEDURE — 85027 COMPLETE CBC AUTOMATED: CPT | Performed by: PHYSICIAN ASSISTANT

## 2021-02-20 PROCEDURE — 84100 ASSAY OF PHOSPHORUS: CPT | Performed by: PHYSICIAN ASSISTANT

## 2021-02-20 PROCEDURE — 99238 HOSP IP/OBS DSCHRG MGMT 30/<: CPT | Performed by: INTERNAL MEDICINE

## 2021-02-20 PROCEDURE — 83735 ASSAY OF MAGNESIUM: CPT | Performed by: PHYSICIAN ASSISTANT

## 2021-02-20 PROCEDURE — 81003 URINALYSIS AUTO W/O SCOPE: CPT | Performed by: PHYSICIAN ASSISTANT

## 2021-02-20 PROCEDURE — 80048 BASIC METABOLIC PNL TOTAL CA: CPT | Performed by: INTERNAL MEDICINE

## 2021-02-20 RX ORDER — POTASSIUM CHLORIDE 14.9 MG/ML
20 INJECTION INTRAVENOUS ONCE
Status: COMPLETED | OUTPATIENT
Start: 2021-02-20 | End: 2021-02-20

## 2021-02-20 RX ORDER — POTASSIUM CHLORIDE 20 MEQ/1
40 TABLET, EXTENDED RELEASE ORAL ONCE
Status: COMPLETED | OUTPATIENT
Start: 2021-02-20 | End: 2021-02-20

## 2021-02-20 RX ADMIN — CYCLOBENZAPRINE HYDROCHLORIDE 10 MG: 10 TABLET, FILM COATED ORAL at 08:40

## 2021-02-20 RX ADMIN — POTASSIUM CHLORIDE 20 MEQ: 14.9 INJECTION, SOLUTION INTRAVENOUS at 00:27

## 2021-02-20 RX ADMIN — ONDANSETRON 4 MG: 2 INJECTION INTRAMUSCULAR; INTRAVENOUS at 08:40

## 2021-02-20 RX ADMIN — AMILORIDE HYDROCLORIDE 5 MG: 5 TABLET ORAL at 08:40

## 2021-02-20 RX ADMIN — Medication 400 MG: at 08:40

## 2021-02-20 RX ADMIN — FERROUS SULFATE TAB 325 MG (65 MG ELEMENTAL FE) 325 MG: 325 (65 FE) TAB at 08:40

## 2021-02-20 RX ADMIN — POTASSIUM CHLORIDE 40 MEQ: 1500 TABLET, EXTENDED RELEASE ORAL at 15:00

## 2021-02-20 RX ADMIN — POTASSIUM CHLORIDE 40 MEQ: 1500 TABLET, EXTENDED RELEASE ORAL at 08:39

## 2021-02-20 RX ADMIN — POTASSIUM CHLORIDE 20 MEQ: 14.9 INJECTION, SOLUTION INTRAVENOUS at 08:40

## 2021-02-20 RX ADMIN — CYCLOBENZAPRINE HYDROCHLORIDE 10 MG: 10 TABLET, FILM COATED ORAL at 14:59

## 2021-02-20 NOTE — H&P
[facility] Internal Medicine  H&P- Maricruz Townsend 1985, 39 y o  female MRN: [id number]    Unit/Bed#: 423-01 Encounter: [id number]    Primary Care Provider: Zan Bose MD   Date and time admitted to hospital: 2/19/2021  9:00 PM        * Hypokalemia  Assessment & Plan  Potassium level at 3 0  She reports that she receives daily Potassium infusions  She received potassium infusions to day and her potassium level at 3 0  She reports feeling very weak and fatigued  She was directed to come to the Hospital by her nephrologist  Admit to med surg  Telemetry monitoring  Replace potassium now  IV fluids  BMP Q 4 hours  Continue with daily potassium infusions in the Am  Nephrology consult  Supportive care    Paresthesias  Assessment & Plan  She reports paresthesias to upper and  lower extremities  She reports that paresthesias are usually present when she has low potassium levels  She reports similar episodes in the past  Monitor and replace potassium levels  Check calcium and magnesium levels  OT, PT eval  Supportive care     GERD (gastroesophageal reflux disease)  Assessment & Plan  She was prescribed Protonix which has been discontinued  Continue supportive care     Vitamin B12 deficiency  Assessment & Plan  Check B12 level    VTE Prophylaxis: Heparin  / sequential compression device   Code Status: Level 1- Full code  POLST: POLST form is not discussed and not completed at this time  Discussion with family: None    Anticipated Length of Stay:  Patient will be admitted on an Inpatient basis with an anticipated length of stay of  > 2 midnights  Justification for Hospital Stay: Hypokalemia, paresthesias    Total Time for Visit, including Counseling / Coordination of Care: 30 minutes  Greater than 50% of this total time spent on direct patient counseling and coordination of care      Chief Complaint:   Abnormal labs    History of Present Illness:    Maricruz Townsend is a 39 y o  female who presents presented as a direct admit to room 423  Patient reports that she gets daily infusion of potassium due to ongoing low potassium levels  She reports she had potassium infusion today potassium levels was still at 3 0  She also reports that she has been feeling fatigued weak with numbness and tingling to her upper lower extremities and around her lips  She reports that the paresthesias she is experiencing are usually present when her potassium levels are low  She was directed by her nephrologist (Dr Greg Velasquez) to come to the hospital to be admitted  She denies chest pain, SOB, cough, fever, chills, vomiting, diarrhea  She does admit to intermittent left lower quadrant pain and nausea  Patient is being admitted on inpatient status [facility] level care for further management of hypokalemia, paresthesias,    Review of Systems:    Review of Systems   Constitutional: Positive for appetite change and fatigue  Negative for chills, diaphoresis and fever  HENT: Negative for congestion and trouble swallowing  Eyes: Negative for photophobia and visual disturbance  Respiratory: Negative for cough, chest tightness, shortness of breath and wheezing  Cardiovascular: Negative for chest pain and leg swelling  Gastrointestinal: Positive for abdominal pain and nausea  Negative for diarrhea and vomiting  Endocrine: Negative for polydipsia, polyphagia and polyuria  Genitourinary: Negative for difficulty urinating, dysuria, flank pain, frequency and hematuria  Musculoskeletal: Negative for arthralgias, back pain, gait problem and joint swelling  Skin: Negative for color change, pallor, rash and wound  Neurological: Positive for weakness and numbness  Negative for dizziness, syncope and headaches  Psychiatric/Behavioral: Negative for agitation and confusion  The patient is not nervous/anxious          Past Medical and Surgical History:     Past Medical History:   Diagnosis Date    H  pylori infection     Hypokalemia     Left lower quadrant abdominal pain 2020    Migraine     Renal disorder     Rhabdomyolysis        Past Surgical History:   Procedure Laterality Date    ABDOMINAL SURGERY      APPENDECTOMY       SECTION      CHOLECYSTECTOMY      COSMETIC SURGERY      "tummy tuck"    FL GUIDED CENTRAL VENOUS ACCESS DEVICE INSERTION  2018    GASTRIC BYPASS      HYSTERECTOMY      LAPAROSCOPY      TUNNELED VENOUS PORT PLACEMENT N/A 2018    Procedure: INSERTION VENOUS PORT (PORT-A-CATH); Surgeon: Margret Martin DO;  Location: [facility] MAIN OR;  Service: General       Meds/Allergies:    Prior to Admission medications    Medication Sig Start Date End Date Taking?  Authorizing Provider   AMILoride 5 mg tablet Take 1 tablet (5 mg total) by mouth daily 19  Yes Misti Link DO   amitriptyline (ELAVIL) 50 mg tablet Take 50 mg by mouth daily at bedtime 18  Yes Historical Provider, MD   B Complex Vitamins (VITAMIN B COMPLEX PO) Take 1 capsule by mouth   Yes Historical Provider, MD   cyanocobalamin 1,000 mcg/mL Inject 1 mL (1,000 mcg total) into a muscle every 30 (thirty) days 20  Yes Luis Clarke DO   ergocalciferol (ERGOCALCIFEROL) 1 25 MG (75428 UT) capsule Take 1 capsule (50,000 Units total) by mouth 2 (two) times a week Patient takes this med on   Patient taking differently: Take 50,000 Units by mouth once a week  and Thursdays 3/26/20  Yes Raven Pollock MD   ergocalciferol (ERGOCALCIFEROL) 1 25 MG (11703 UT) capsule Take 50,000 Units by mouth 2 (two) times a week   Yes Historical Provider, MD   escitalopram (LEXAPRO) 10 mg tablet Take 20 mg by mouth daily at bedtime    Yes Historical Provider, MD   ferrous sulfate 325 (65 Fe) mg tablet Take 325 mg by mouth every other day Last took 3/9/20   Yes Historical Provider, MD   ferrous sulfate 325 (65 Fe) mg tablet Take 65 mg by mouth every other day   Yes Historical Provider, MD   potassium chloride 0 4 mEq/mL Infuse 200 mL (80 mEq total) into a venous catheter daily 80 meq in 1 litre bag to be infused daily over 8 hours 1/14/21  Yes Dafne Moncada DO   Catheters MISC by Does not apply route 2 (two) times a week Port care per [place] protocol 5/7/20   Dafne Moncada DO   cyclobenzaprine (FLEXERIL) 10 mg tablet Take 10 mg by mouth 3 (three) times a day    Historical Provider, MD   magnesium oxide (MAG-OX) 400 mg tablet Take 400 mg by mouth 7/24/20 7/24/21  Historical Provider, MD   pantoprazole (PROTONIX) 40 mg tablet Take 1 tablet (40 mg total) by mouth daily  Patient not taking: Reported on 10/30/2020 8/10/20   Dafne Moncada DO   Syringe/Needle, Disp, (SYRINGE 3CC/25GX5/8") 25G X 5/8" 3 ML MISC Use once monthly for B12 injection  6/1/20   Dafne Moncada DO     I have reviewed home medications with patient personally  Allergies: Allergies   Allergen Reactions    Nsaids Other (See Comments)     Other reaction(s): Other (Please comment)  Should not take s/p bariatric surgery  Other reaction(s):  Other (See Comments)  Should not take as per prior records  Should not take s/p bariatric surgery  Has hx of gastric bypass      Prednisone      Nausea, vomiting, diarrhea       Social History:     Marital Status: /Civil Union   Occupation: Stay Home Mom  Patient Pre-hospital Living Situation: Lives with family  Patient Pre-hospital Level of Mobility: Active  Patient Pre-hospital Diet Restrictions: None reported  Substance Use History:   Social History     Substance and Sexual Activity   Alcohol Use Never    Alcohol/week: 0 0 standard drinks    Frequency: Never    Drinks per session: Patient refused    Binge frequency: Never    Comment: 0     Social History     Tobacco Use   Smoking Status Never Smoker   Smokeless Tobacco Never Used     Social History     Substance and Sexual Activity   Drug Use Never       Family History:    Family History   Problem Relation Age of Onset    No Known Problems Mother    Nigel Bones Hypertension Father     Diabetes Father     Diabetes Maternal Grandfather        Physical Exam:     Vitals:   Blood Pressure: 120/64 (02/19/21 2125)  Pulse: 73 (02/19/21 2125)  Temperature: 97 9 °F (36 6 °C) (02/19/21 2125)  Temp Source: Oral (02/19/21 2125)  Respirations: 18 (02/19/21 2125)  Height: 5' 3" (160 cm) (02/19/21 2125)  Weight - Scale: 58 9 kg (129 lb 13 6 oz) (02/19/21 2125)  SpO2: 94 % (02/19/21 2125)    Physical Exam  Constitutional:       General: She is not in acute distress  Appearance: She is not ill-appearing or diaphoretic  HENT:      Head: Normocephalic and atraumatic  Eyes:      General:         Right eye: No discharge  Left eye: No discharge  Pupils: Pupils are equal, round, and reactive to light  Neck:      Musculoskeletal: Normal range of motion and neck supple  Cardiovascular:      Rate and Rhythm: Normal rate and regular rhythm  Pulses: Normal pulses  Pulmonary:      Effort: Pulmonary effort is normal  No respiratory distress  Breath sounds: No wheezing, rhonchi or rales  Abdominal:      General: There is no distension  Palpations: Abdomen is soft  Tenderness: There is no abdominal tenderness  Musculoskeletal: Normal range of motion  Right lower leg: No edema  Left lower leg: No edema  Skin:     General: Skin is warm and dry  Capillary Refill: Capillary refill takes less than 2 seconds  Coloration: Skin is not jaundiced or pale  Findings: No bruising or erythema  Neurological:      Mental Status: She is alert and oriented to person, place, and time  Psychiatric:         Mood and Affect: Mood normal              Additional Data:     Lab Results: I have personally reviewed pertinent reports            Results from last 7 days   Lab Units 02/19/21  1329   SODIUM mmol/L 136   POTASSIUM mmol/L 3 0*   CHLORIDE mmol/L 104   CO2 mmol/L 18*   BUN mg/dL 11   CREATININE mg/dL 1 18   ANION GAP mmol/L 14*   CALCIUM mg/dL 8 0*             Results from last 7 days   Lab Units 02/19/21  1329   HEMOGLOBIN A1C % 5 2           Imaging: I have personally reviewed pertinent reports  No orders to display       EKG, Pathology, and Other Studies Reviewed on Admission:   · EKG:     Allscripts / Epic Records Reviewed: Yes     ** Please Note: This note has been constructed using a voice recognition system   **

## 2021-02-20 NOTE — PLAN OF CARE
Problem: PAIN - ADULT  Goal: Verbalizes/displays adequate comfort level or baseline comfort level  Description: Interventions:  - Encourage patient to monitor pain and request assistance  - Assess pain using appropriate pain scale (0-10 pain scale)  - Administer analgesics based on type and severity of pain and evaluate response  - Implement non-pharmacological measures as appropriate and evaluate response  - Consider cultural and social influences on pain and pain management  - Notify physician/advanced practitioner if interventions unsuccessful or patient reports new pain  Outcome: Progressing     Problem: INFECTION - ADULT  Goal: Absence or prevention of progression during hospitalization  Description: INTERVENTIONS:  - Assess and monitor for signs and symptoms of infection  - Monitor lab/diagnostic results  - Monitor all insertion sites, i e  indwelling lines  - Administer medications as ordered  - Instruct and encourage patient and family to use good hand hygiene technique  Outcome: Progressing     Problem: SAFETY ADULT  Goal: Patient will remain free of falls  Description: INTERVENTIONS:  - Assess patient frequently for physical needs  -  Identify cognitive and physical deficits and behaviors that affect risk of falls    -  [place] fall precautions as indicated by assessment  (moderate fall risk)  - Educate patient/family on patient safety including physical limitations  - Instruct patient to call for assistance with activity based on assessment  - Modify environment to reduce risk of injury  Outcome: Progressing  Goal: Maintain or return to baseline ADL function  Description: INTERVENTIONS:  -  Assess patient's ability to carry out ADLs; (independent)   - Assess range of motion  - Assess patient's mobility; (independent)  - Encourage maximum independence but intervene and supervise when necessary  - Assess for home care needs following discharge   - Provide patient education as appropriate  Outcome: Progressing  Goal: Maintain or return mobility status to optimal level  Description: INTERVENTIONS:  - Assess patient's baseline mobility status (independent)    - [place] fall precautions as indicated by assessment (moderate fall risk)  - Record patient progress and toleration of activity level on Mobility SBAR; progress patient to next Phase/Stage  - Instruct patient to call for assistance with activity based on assessment  Outcome: Progressing     Problem: DISCHARGE PLANNING  Goal: Discharge to home or other facility with appropriate resources  Description: INTERVENTIONS:  - Identify barriers to discharge w/patient and caregiver  - Arrange for needed discharge resources and transportation as appropriate  - Identify discharge learning needs (meds, wound care, etc )  - Refer to Case Management Department for coordinating discharge planning if the patient needs post-hospital services based on physician/advanced practitioner order or complex needs related to functional status, cognitive ability, or social support system  Outcome: Progressing     Problem: Knowledge Deficit  Goal: Patient/family/caregiver demonstrates understanding of disease process, treatment plan, medications, and discharge instructions  Description: Complete learning assessment and assess knowledge base    Interventions:  - Provide teaching at level of understanding  - Provide teaching via preferred learning methods  Outcome: Progressing     Problem: METABOLIC, FLUID AND ELECTROLYTES - ADULT  Goal: Electrolytes maintained within normal limits  Description: INTERVENTIONS:  - Monitor labs and assess patient for signs and symptoms of electrolyte imbalances  - Administer electrolyte replacement as ordered  - Monitor response to electrolyte replacements, including repeat lab results as appropriate  - Instruct patient on fluid and nutrition as appropriate  Outcome: Progressing

## 2021-02-20 NOTE — PLAN OF CARE
Problem: PAIN - ADULT  Goal: Verbalizes/displays adequate comfort level or baseline comfort level  Description: Interventions:  - Encourage patient to monitor pain and request assistance  - Assess pain using appropriate pain scale  - Administer analgesics based on type and severity of pain and evaluate response  - Implement non-pharmacological measures as appropriate and evaluate response  - Consider cultural and social influences on pain and pain management  - Notify physician/advanced practitioner if interventions unsuccessful or patient reports new pain  Outcome: Progressing     Problem: INFECTION - ADULT  Goal: Absence or prevention of progression during hospitalization  Description: INTERVENTIONS:  - Assess and monitor for signs and symptoms of infection  - Monitor lab/diagnostic results  - Monitor all insertion sites, i e  indwelling lines, tubes, and drains  - Monitor endotracheal if appropriate and nasal secretions for changes in amount and color  - [place] appropriate cooling/warming therapies per order  - Administer medications as ordered  - Instruct and encourage patient and family to use good hand hygiene technique  - Identify and instruct in appropriate isolation precautions for identified infection/condition  Outcome: Progressing     Problem: SAFETY ADULT  Goal: Patient will remain free of falls  Description: INTERVENTIONS:  - Assess patient frequently for physical needs  -  Identify cognitive and physical deficits and behaviors that affect risk of falls    -  [place] fall precautions as indicated by assessment   - Educate patient/family on patient safety including physical limitations  - Instruct patient to call for assistance with activity based on assessment  - Modify environment to reduce risk of injury  - Consider OT/PT consult to assist with strengthening/mobility  Outcome: Progressing  Goal: Maintain or return to baseline ADL function  Description: INTERVENTIONS:  -  Assess patient's ability to carry out ADLs; assess patient's baseline for ADL function and identify physical deficits which impact ability to perform ADLs (bathing, care of mouth/teeth, toileting, grooming, dressing, etc )  - Assess/evaluate cause of self-care deficits   - Assess range of motion  - Assess patient's mobility; develop plan if impaired  - Assess patient's need for assistive devices and provide as appropriate  - Encourage maximum independence but intervene and supervise when necessary  - Involve family in performance of ADLs  - Assess for home care needs following discharge   - Consider OT consult to assist with ADL evaluation and planning for discharge  - Provide patient education as appropriate  Outcome: Progressing  Goal: Maintain or return mobility status to optimal level  Description: INTERVENTIONS:  - Assess patient's baseline mobility status (ambulation, transfers, stairs, etc )    - Identify cognitive and physical deficits and behaviors that affect mobility  - Identify mobility aids required to assist with transfers and/or ambulation (gait belt, sit-to-stand, lift, walker, cane, etc )  - [place] fall precautions as indicated by assessment  - Record patient progress and toleration of activity level on Mobility SBAR; progress patient to next Phase/Stage  - Instruct patient to call for assistance with activity based on assessment  - Consider rehabilitation consult to assist with strengthening/weightbearing, etc   Outcome: Progressing     Problem: DISCHARGE PLANNING  Goal: Discharge to home or other facility with appropriate resources  Description: INTERVENTIONS:  - Identify barriers to discharge w/patient and caregiver  - Arrange for needed discharge resources and transportation as appropriate  - Identify discharge learning needs (meds, wound care, etc )  - Arrange for interpretive services to assist at discharge as needed  - Refer to Case Management Department for coordinating discharge planning if the patient needs post-hospital services based on physician/advanced practitioner order or complex needs related to functional status, cognitive ability, or social support system  Outcome: Progressing     Problem: Knowledge Deficit  Goal: Patient/family/caregiver demonstrates understanding of disease process, treatment plan, medications, and discharge instructions  Description: Complete learning assessment and assess knowledge base    Interventions:  - Provide teaching at level of understanding  - Provide teaching via preferred learning methods  Outcome: Progressing

## 2021-02-20 NOTE — CASE MANAGEMENT
Cm met with the patient to evaluate the patients prior function and living situation and any barriers to d/c and form a safe d/c plan  Cm also evaluated the patient for any services in the home or needs for services  Patient states she lives w/her  and children in a 1 story home w/0STE's  She is independent w/her ADL's and does not have or use any DME's  She drives and states her  will transport her home on discharge  She denies any history of any behavioral health needs/admissions  She has not had any home care in the past  Her PCP is Dr Sally Keys and she also follows with Dr Godinez Dexter  [facility]  She plans on returning home on discharge and states she doesn't have any needs

## 2021-02-20 NOTE — DISCHARGE SUMMARY
Discharge- Santo Moore 1985, 39 y o  female MRN: [id number]    Unit/Bed#: 423-01 Encounter: [id number]    Primary Care Provider: Tc Issa MD   Date and time admitted to hospital: 2/19/2021  9:00 PM        * Hypokalemia  Assessment & Plan  Potassium level at 3 0 at time of admission - patient has tolerated for less in the past   She currently receives potassium infusions as an outpatient, and presenting with weakness and fatigue  Morning potassium improved to 3 3, up to 3 6 this afternoon  Patient feels well enough to be discharged home  Will supply with an additional oral dose prior to discharge as well  Discharging Physician / Practitioner: Dedra Louis MD  PCP: Tc Issa MD  Admission Date:   Admission Orders (From admission, onward)     Ordered        02/19/21 2126  INPATIENT ADMISSION  Once                   Discharge Date: 02/20/21    Resolved Problems  Date Reviewed: 2/20/2021    None          Consultations During Hospital Stay:  · None    Procedures Performed:   · None    Significant Findings / Test Results:   No results found  Incidental Findings:   · None     Test Results Pending at Discharge (will require follow up): · None     Outpatient Tests Requested:  · None    Complications:  None    Reason for Admission: Hypokalemia    HPI from original H&P:     Santo Moore is a 39 y o  female who presents presented as a direct admit to room 423  Patient reports that she gets daily infusion of potassium due to ongoing low potassium levels  She reports she had potassium infusion today potassium levels was still at 3 0  She also reports that she has been feeling fatigued weak with numbness and tingling to her upper lower extremities and around her lips  She reports that the paresthesias she is experiencing are usually present when her potassium levels are low  She was directed by her nephrologist (Dr Stan Hong) to come to the hospital to be admitted   She denies chest pain, SOB, cough, fever, chills, vomiting, diarrhea  She does admit to intermittent left lower quadrant pain and nausea  Patient is being admitted on inpatient status med surg level care for further management of hypokalemia, paresthesias  Please see above list of diagnoses and related plan for additional information  Condition at Discharge: stable     Discharge Day Visit / Exam:     * Please refer to separate progress note for these details *    Discharge instructions/Information to patient and family:   See after visit summary for information provided to patient and family  Provisions for Follow-Up Care:  See after visit summary for information related to follow-up care and any pertinent home health orders  Disposition:     Home    For Discharges to [facility] SNF:   · Not Applicable to this Patient - Not Applicable to this Patient    Planned Readmission: No     Discharge Statement:  I spent 15 minutes discharging the patient  This time was spent on the day of discharge  I had direct contact with the patient on the day of discharge  Greater than 50% of the total time was spent examining patient, answering all patient questions, arranging and discussing plan of care with patient as well as directly providing post-discharge instructions  Additional time then spent on discharge activities  Discharge Medications:  See after visit summary for reconciled discharge medications provided to patient and family        ** Please Note: This note has been constructed using a voice recognition system **

## 2021-02-20 NOTE — CASE MANAGEMENT
Discussed with patient preferences on discharge;understanding how to manage health at home; purpose of taking medications; importance of follow up care/appointments; and symptoms to watch out for once discharged home  Patient is being discharged today to home  She states her  will transport her home  She has follow up appointment with Dr Rosi Hollis

## 2021-02-20 NOTE — PHYSICAL THERAPY NOTE
PT roland order received  Spoke with nsg and patient  Patient reports that she has been safely walking in and out of bathroom  Nsg in agreement that pt has been safely walking to the bathroom  Pt does not feel that she requires PT at this time  Due to no skilled PT needs being identified at this time, will D/C PT

## 2021-02-20 NOTE — ASSESSMENT & PLAN NOTE
Potassium level at 3 0  She reports that she receives daily Potassium infusions  She received potassium infusions to day and her potassium level at 3 0  She reports feeling very weak and fatigued  She was directed to come to the Hospital by her nephrologist  Admit to med surg  Telemetry monitoring  Replace potassium now  IV fluids  BMP Q 4 hours  Continue with daily potassium infusions in the Am  Nephrology consult  Supportive care

## 2021-02-20 NOTE — ASSESSMENT & PLAN NOTE
Potassium level at 3 0 at time of admission - patient has tolerated for less in the past   She currently receives potassium infusions as an outpatient, and presenting with weakness and fatigue  Morning potassium improved to 3 3, up to 3 6 this afternoon  Patient feels well enough to be discharged home  Will supply with an additional oral dose prior to discharge as well

## 2021-02-20 NOTE — ASSESSMENT & PLAN NOTE
She reports paresthesias to upper and  lower extremities  She reports that paresthesias are usually present when she has low potassium levels  She reports similar episodes in the past  Monitor and replace potassium levels  Check calcium and magnesium levels  OT, PT eval  Supportive care

## 2021-02-20 NOTE — UTILIZATION REVIEW
Initial Clinical Review    Admission: Date/Time/Statement:   Admission Orders (From admission, onward)     Ordered        02/19/21 2126  INPATIENT ADMISSION  Once                   Orders Placed This Encounter   Procedures    INPATIENT ADMISSION     Standing Status:   Standing     Number of Occurrences:   1     Order Specific Question:   Level of Care     Answer:   Med Surg [16]     Order Specific Question:   Estimated length of stay     Answer:   More than 2 Midnights     Order Specific Question:   Certification     Answer:   I certify that inpatient services are medically necessary for this patient for a duration of greater than two midnights  See H&P and MD Progress Notes for additional information about the patient's course of treatment  Assessment/Plan: 39year old female, [facility] and a direct Medical Admission  Admitted as Inpatient due to  Hypokalemia  She gets daily infusion of potassium due to ongoing low potassium levels  She reports she had potassium infusion today potassium levels was still at 3 0  She also reports that she has been feeling fatigued weak with numbness and tingling to her upper lower extremities and around her lips   + for appetite change fatigue, abd pain and nausea and weakness and numbness  Monitor on telemetry  IV flds  BMP q4h       VTE Prophylaxis: Heparin  / sequential compression device    Triage Vitals   Temperature Pulse Respirations Blood Pressure SpO2   02/19/21 2125 02/19/21 2125 02/19/21 2125 02/19/21 2125 02/19/21 2125   97 9 °F (36 6 °C) 73 18 120/64 94 %      Temp Source Heart Rate Source Patient Position - Orthostatic VS BP Location FiO2 (%)   02/19/21 2125 -- 02/19/21 2125 02/19/21 2125 --   Oral  Sitting Right arm       Pain Score       02/19/21 2103       No Pain          Wt Readings from Last 1 Encounters:   02/20/21 60 kg (132 lb 4 4 oz)     Additional Vital Signs:   Date/Time  Temp  Pulse  Resp  BP  MAP (mmHg)  SpO2  O2 Device  Patient Position - Orthostatic VS   02/20/21 06:57:36  97 7 °F (36 5 °C)  75  16  100/55  70  98 %  None (Room air)  Lying   02/19/21 21:25:27  97 9 °F (36 6 °C)  73  18  120/64  83  94 %  None (Room air)  Sitting     Pertinent Labs/Diagnostic Test Results:     Results from last 7 days   Lab Units 02/20/21  0447 02/19/21  2214   WBC Thousand/uL 5 95 8 20   HEMOGLOBIN g/dL 10 0* 10 6*   HEMATOCRIT % 32 7* 35 0   PLATELETS Thousands/uL 297 285     Results from last 7 days   Lab Units 02/20/21  1242 02/20/21  0447 02/19/21  2216 02/19/21  1329   SODIUM mmol/L 137 136 134* 136   POTASSIUM mmol/L 3 6 3 3* 3 0* 3 0*   CHLORIDE mmol/L 109* 107 105 104   CO2 mmol/L 18* 16* 15* 18*   ANION GAP mmol/L 10 13 14* 14*   BUN mg/dL 8 11 11 11   CREATININE mg/dL 0 98 0 99 1 00 1 18   EGFR ml/min/1 73sq m 74 73 72 59   CALCIUM mg/dL 7 8* 8 2* 8 4 8 0*   MAGNESIUM mg/dL  --  2 1 2 1  --    PHOSPHORUS mg/dL  --  4 5  --   --      Results from last 7 days   Lab Units 02/20/21  0447   AST U/L 18   ALT U/L 16   ALK PHOS U/L 49   TOTAL PROTEIN g/dL 6 3*   ALBUMIN g/dL 3 2*   TOTAL BILIRUBIN mg/dL 0 20     Results from last 7 days   Lab Units 02/20/21  1242 02/20/21  0447 02/19/21  2216   GLUCOSE RANDOM mg/dL 86 77 84     Results from last 7 days   Lab Units 02/19/21  1329   HEMOGLOBIN A1C % 5 2   EAG mg/dl 103     Results from last 7 days   Lab Units 02/20/21  0501 02/19/21  1329   CLARITY UA  Clear  --    COLOR UA  Yellow  --    SPEC GRAV UA  1 010  --    PH UA  6 5  --    GLUCOSE UA mg/dl Negative  --    KETONES UA mg/dl Negative  --    BLOOD UA  Negative  --    PROTEIN UA mg/dl Negative  --    NITRITE UA  Negative  --    BILIRUBIN UA  Negative  --    UROBILINOGEN UA E U /dl 0 2  --    LEUKOCYTES UA  Negative  --    CREATININE UR mg/dL  --  331 0     Past Medical History:   Diagnosis Date    H  pylori infection     Hypokalemia     Left lower quadrant abdominal pain 8/17/2020    Migraine     Renal disorder     Rhabdomyolysis      Present on Admission:   Hypokalemia      Admitting Diagnosis: Hypokalemia [E87 6]  Age/Sex: 39 y o  female  Admission Orders:  Scheduled Medications:  AMILoride, 5 mg, Oral, Daily  amitriptyline, 50 mg, Oral, HS  cyclobenzaprine, 10 mg, Oral, TID  escitalopram, 20 mg, Oral, HS  ferrous sulfate, 325 mg, Oral, Every Other Day  heparin (porcine), 5,000 Units, Subcutaneous, Q8H MIKE  magnesium oxide, 400 mg, Oral, Daily With Breakfast  potassium chloride (K-DUR,KLOR-CON) CR tablet 40 mEq   Dose: 40 mEq  Freq: Once Route: PO  Start: 02/20/21 1600 End: 02/20/21 1500  potassium chloride (K-DUR,KLOR-CON) CR tablet 40 mEq   Dose: 40 mEq  Freq: Once Route: PO  Start: 02/20/21 1600 End: 02/20/21 1500  potassium chloride 20 mEq IVPB (premix)   Dose: 20 mEq  Freq: Once Route: IV  Last Dose: Stopped (02/20/21 1040)  Start: 02/20/21 0900 End: 02/20/21 1040  potassium chloride 20 mEq IVPB (premix)   Dose: 20 mEq  Freq: Once Route: IV  Last Dose: Stopped (02/20/21 0227)  Start: 02/19/21 2200 End: 02/20/21 0227    Continuous IV Infusions:  sodium chloride, 100 mL/hr, Intravenous, Continuous      PRN Meds:  acetaminophen, 650 mg, Oral, Q6H PRN  ondansetron, 4 mg, Intravenous, Q6H PRN  X 1 dose 2/20      Consult OT  [place] SCDs  IP CONSULT TO CASE MANAGEMENT    Network Utilization Review Department  ATTENTION: Please call with any questions or concerns to [phone] and carefully listen to the prompts so that you are directed to the right person  All voicemails are confidential   Wendie Li all requests for admission clinical reviews, approved or denied determinations and any other requests to dedicated fax number below belonging to the campus where the patient is receiving treatment  List of dedicated fax numbers for the Facilities:  [address] DENIALS (Administrative/Medical Necessity) [phone]   [address] (Maternity/NICU/Pediatrics) [phone]   [address] [place] [address] [phone]     [address] [phone]   Sana Beavers [address] ([address]) [address] [place] [address] Kevon Cecy Hu [address] [phone]   [address] [phone]

## 2021-02-21 LAB
ATRIAL RATE: 71 BPM
P AXIS: 24 DEGREES
PR INTERVAL: 112 MS
QRS AXIS: 49 DEGREES
QRSD INTERVAL: 94 MS
QT INTERVAL: 396 MS
QTC INTERVAL: 430 MS
T WAVE AXIS: 53 DEGREES
VENTRICULAR RATE: 71 BPM

## 2021-02-21 PROCEDURE — 93010 ELECTROCARDIOGRAM REPORT: CPT | Performed by: INTERNAL MEDICINE

## 2021-02-21 NOTE — UTILIZATION REVIEW
Notification of Inpatient Admission/Inpatient Authorization Request   This is a Notification of Inpatient Admission for [address]  Be advised that this patient was admitted to our facility under Inpatient Status  Contact Janyce Severs at [phone] for additional admission information  [facility] UR DEPT  DEDICATED [phone]  Patient Name:   Colletta Nice   YOB: 1985       [address]   [address]:   [address]  Tax ID: [id number]  NPI: [id number] Attending Provider/NPI:  Phone:  Address: Opal Davis [[id number]]  [phone]  Same as Facility   Place of Service Code: 24 Place of Service Name:  [address] [place] [address]   Start Date: 2/19/21 2100 Discharge Date & Time: 2/20/2021  3:07 PM    Type of Admission: Inpatient Status Discharge Disposition   (if discharged): Home/Self Care   Patient Diagnoses: Hypokalemia [E87 6]     Orders: Admission Orders (From admission, onward)     Ordered        02/19/21 2126  INPATIENT ADMISSION  Once                    Assigned Utilization Review Contact: Janyce Severs  Utilization   Network Utilization Review Department  Phone: [phone]; Fax [phone]  Email: [email or web address]   ATTENTION PAYERS: Please call the assigned Utilization  directly with any questions or concerns ALL voicemails in the department are confidential  Send all requests for admission clinical reviews, approved or denied determinations and any other requests to dedicated fax number belonging to the campus where the patient is receiving treatment

## 2021-02-22 NOTE — UTILIZATION REVIEW
Notification of Discharge  This is a Notification of Discharge from our facility [address] [place] [address]  Please be advised that this patient has been discharge from our facility  Below you will find the admission and discharge date and time including the patients disposition  PRESENTATION DATE: 2/19/2021  9:00 PM  OBS ADMISSION DATE:   IP ADMISSION DATE: 2/19/21 2100   DISCHARGE DATE: 2/20/2021  3:07 PM  DISPOSITION: Home/Self Care Home/Self Care   Admission Orders listed below:  Admission Orders (From admission, onward)     Ordered        02/19/21 2126  INPATIENT ADMISSION  Once                   Please contact the UR Department if additional information is required to close this patient's authorization/case  [facility] Utilization Review Department  Main: [phone] x carefully listen to the prompts  All voicemails are confidential   [email or web address]  Send all requests for admission clinical reviews, approved or denied determinations and any other requests to dedicated fax number below belonging to the campus where the patient is receiving treatment   List of dedicated fax numbers:  [address] DENIALS (Administrative/Medical Necessity) [phone]   [address] (Maternity/NICU/Pediatrics) [phone]   Rupa Polanco [phone]   Iva Toledo [phone]   Susan Ordonez [phone]   Merlinda [place]s [place] [address] [phone]   [facility]  [phone]   [address] [facility] [address]  [address] [place] [address] [address] [facility] [phone]

## 2021-03-10 DIAGNOSIS — Z23 ENCOUNTER FOR IMMUNIZATION: ICD-10-CM

## 2021-03-15 DIAGNOSIS — E87.6 HYPOKALEMIA: Primary | ICD-10-CM

## 2021-03-16 ENCOUNTER — APPOINTMENT (OUTPATIENT)
Dept: LAB | Facility: HOSPITAL | Age: 36
End: 2021-03-16
Attending: INTERNAL MEDICINE
Payer: COMMERCIAL

## 2021-03-16 DIAGNOSIS — E87.6 HYPOKALEMIA: ICD-10-CM

## 2021-03-22 ENCOUNTER — APPOINTMENT (OUTPATIENT)
Dept: LAB | Facility: HOSPITAL | Age: 36
End: 2021-03-22
Attending: INTERNAL MEDICINE
Payer: COMMERCIAL

## 2021-03-22 DIAGNOSIS — E87.6 HYPOKALEMIA: Primary | ICD-10-CM

## 2021-03-22 DIAGNOSIS — E87.6 HYPOKALEMIA: ICD-10-CM

## 2021-03-22 LAB
ANION GAP SERPL CALCULATED.3IONS-SCNC: 9 MMOL/L (ref 4–13)
BUN SERPL-MCNC: 10 MG/DL (ref 5–25)
CALCIUM SERPL-MCNC: 8.4 MG/DL (ref 8.3–10.1)
CHLORIDE SERPL-SCNC: 106 MMOL/L (ref 100–108)
CO2 SERPL-SCNC: 22 MMOL/L (ref 21–32)
CREAT SERPL-MCNC: 1.32 MG/DL (ref 0.6–1.3)
GFR SERPL CREATININE-BSD FRML MDRD: 52 ML/MIN/1.73SQ M
GLUCOSE SERPL-MCNC: 61 MG/DL (ref 65–140)
POTASSIUM SERPL-SCNC: 3.7 MMOL/L (ref 3.5–5.3)
SODIUM SERPL-SCNC: 137 MMOL/L (ref 136–145)

## 2021-03-22 PROCEDURE — 80048 BASIC METABOLIC PNL TOTAL CA: CPT

## 2021-03-22 PROCEDURE — 36415 COLL VENOUS BLD VENIPUNCTURE: CPT

## 2021-03-24 RX ORDER — DICYCLOMINE HYDROCHLORIDE 10 MG/1
10 CAPSULE ORAL
Status: ON HOLD | COMMUNITY
Start: 2021-03-17 | End: 2021-06-19

## 2021-03-24 RX ORDER — ONDANSETRON 4 MG/1
4 TABLET, ORALLY DISINTEGRATING ORAL
COMMUNITY
Start: 2021-03-17 | End: 2021-03-24

## 2021-03-25 ENCOUNTER — HOSPITAL ENCOUNTER (OUTPATIENT)
Dept: CT IMAGING | Facility: HOSPITAL | Age: 36
Discharge: HOME | End: 2021-03-25
Payer: COMMERCIAL

## 2021-03-25 ENCOUNTER — TELEPHONE (OUTPATIENT)
Dept: CT IMAGING | Facility: HOSPITAL | Age: 36
End: 2021-03-25

## 2021-03-25 ENCOUNTER — TRANSCRIBE ORDERS (OUTPATIENT)
Dept: ADMINISTRATIVE | Facility: HOSPITAL | Age: 36
End: 2021-03-25

## 2021-03-25 DIAGNOSIS — K56.609 UNSPECIFIED INTESTINAL OBSTRUCTION, UNSPECIFIED AS TO PARTIAL VERSUS COMPLETE OBSTRUCTION (HCC): Primary | ICD-10-CM

## 2021-03-25 DIAGNOSIS — K56.609 UNSPECIFIED INTESTINAL OBSTRUCTION, UNSPECIFIED AS TO PARTIAL VERSUS COMPLETE OBSTRUCTION (HCC): ICD-10-CM

## 2021-03-25 PROCEDURE — 74176 CT ABD & PELVIS W/O CONTRAST: CPT

## 2021-03-25 PROCEDURE — G1004 CDSM NDSC: HCPCS

## 2021-03-25 RX ADMIN — IOHEXOL 50 ML: 240 INJECTION, SOLUTION INTRATHECAL; INTRAVASCULAR; INTRAVENOUS; ORAL at 19:00

## 2021-03-25 NOTE — TELEPHONE ENCOUNTER
Patient arrived as an outpatient at approximately 1900hrs, pt is to drink PO contrast scan time 2030hrs

## 2021-03-26 ENCOUNTER — OFFICE VISIT (OUTPATIENT)
Dept: NEPHROLOGY | Facility: CLINIC | Age: 36
End: 2021-03-26
Payer: COMMERCIAL

## 2021-03-26 VITALS
HEART RATE: 103 BPM | OXYGEN SATURATION: 100 % | WEIGHT: 137 LBS | SYSTOLIC BLOOD PRESSURE: 124 MMHG | HEIGHT: 63 IN | BODY MASS INDEX: 24.27 KG/M2 | DIASTOLIC BLOOD PRESSURE: 70 MMHG

## 2021-03-26 DIAGNOSIS — R79.89 BLOOD CREATININE INCREASED COMPARED WITH PRIOR MEASUREMENT: ICD-10-CM

## 2021-03-26 DIAGNOSIS — Z98.84 GASTRIC BYPASS STATUS FOR OBESITY: ICD-10-CM

## 2021-03-26 DIAGNOSIS — E87.6 HYPOKALEMIA: Primary | ICD-10-CM

## 2021-03-26 DIAGNOSIS — R14.1 GAS PAIN: ICD-10-CM

## 2021-03-26 PROCEDURE — 99244 OFF/OP CNSLTJ NEW/EST MOD 40: CPT | Performed by: INTERNAL MEDICINE

## 2021-03-26 NOTE — ASSESSMENT & PLAN NOTE
CT scan the abdomen pelvis reviewed with the patient, I will defer to her primary care provider for further evaluation and care  However, when obvious finding was significant amount of gas in the colon  Patient will try Gas-X or other medications similar to Gas-X to assist with symptomatic pain

## 2021-03-26 NOTE — ASSESSMENT & PLAN NOTE
This most likely due to volume depletion  Patient is also most likely a mild acute kidney injury at this time  Continue to encourage oral intake of fluid  My hope is that as the patient's symptoms in her abdomen improve, she will be able to eat and drink normally  Will check blood work in about 2 weeks

## 2021-03-26 NOTE — PROGRESS NOTES
[facility] [place], [place]    Name: Alvina Pate  YOB: 1985      Assessment/Plan:    Hypokalemia  Potassium level is appropriate, continue with current care  Patient currently has 80 mEq of IV potassium infused on a daily basis  Blood creatinine increased compared with prior measurement  This most likely due to volume depletion  Patient is also most likely a mild acute kidney injury at this time  Continue to encourage oral intake of fluid  My hope is that as the patient's symptoms in her abdomen improve, she will be able to eat and drink normally  Will check blood work in about 2 weeks  Gas pain  CT scan the abdomen pelvis reviewed with the patient, I will defer to her primary care provider for further evaluation and care  However, when obvious finding was significant amount of gas in the colon  Patient will try Gas-X or other medications similar to Gas-X to assist with symptomatic pain  Problem List Items Addressed This Visit        Other    Hypokalemia - Primary     Potassium level is appropriate, continue with current care  Patient currently has 80 mEq of IV potassium infused on a daily basis  Relevant Orders    Basic metabolic panel    Magnesium    Gastric bypass status for obesity    Blood creatinine increased compared with prior measurement     This most likely due to volume depletion  Patient is also most likely a mild acute kidney injury at this time  Continue to encourage oral intake of fluid  My hope is that as the patient's symptoms in her abdomen improve, she will be able to eat and drink normally  Will check blood work in about 2 weeks  Gas pain     CT scan the abdomen pelvis reviewed with the patient, I will defer to her primary care provider for further evaluation and care  However, when obvious finding was significant amount of gas in the colon    Patient will try Gas-X or other medications similar to Gas-X to assist with symptomatic pain  Will see the patient back in 6 months for regular appointment  Will continue with IV potassium chloride infusions in order to help control potassium in the outpatient setting  Will follow-up blood work in 2 weeks due to mild acute kidney injury due to volume depletion, most likely, from the patient's current GI issue  Subjective:      Patient ID: Glen Perry is a 39 y o  female  Patient presents for follow up regarding hypokalemia    Patient has been experiencing abdominal pain and diarrhea  She was prescribed Bentyl and Zofran, which have improved her symptoms, but she continues to have the underlying issues  Patient has had time eating and drinking normally she tries to drink Gatorade other liquid salt help sustain volume  The potassium standpoint, patient has chronic hypokalemia issues currently controlled with home infusions of IV potassium chloride  Her labs reviewed and potassium levels have been appropriate  Patient's kidney function is lower than usual, likely due to volume depletion from lack of oral intake coupled with increased GI losses  Continue to avoid NSAIDs  The following portions of the patient's history were reviewed and updated as appropriate: allergies, current medications, past family history, past medical history, past social history, past surgical history and problem list     Review of Systems   All other systems reviewed and are negative          Social History     Socioeconomic History    Marital status: /Civil Union     Spouse name: None    Number of children: None    Years of education: None    Highest education level: None   Occupational History    None   Social Needs    Financial resource strain: None    Food insecurity     Worry: None     Inability: None    Transportation needs     Medical: None     Non-medical: None   Tobacco Use    Smoking status: Never Smoker    Smokeless tobacco: Never Used   Substance and Sexual Activity    Alcohol use: Never     Alcohol/week: 0 0 standard drinks     Frequency: Never     Drinks per session: Patient refused     Binge frequency: Never     Comment: 0    Drug use: Never    Sexual activity: Yes     Partners: Male   Lifestyle    Physical activity     Days per week: None     Minutes per session: None    Stress: None   Relationships    Social connections     Talks on phone: None     Gets together: None     Attends Voodoo service: None     Active member of club or organization: None     Attends meetings of clubs or organizations: None     Relationship status: None    Intimate partner violence     Fear of current or ex partner: None     Emotionally abused: None     Physically abused: None     Forced sexual activity: None   Other Topics Concern    None   Social History Narrative    Rarely consumes alcohol - As per [employer]      Past Medical History:   Diagnosis Date    H  pylori infection     Hypokalemia     Left lower quadrant abdominal pain 2020    Migraine     Renal disorder     Rhabdomyolysis      Past Surgical History:   Procedure Laterality Date    ABDOMINAL SURGERY      APPENDECTOMY       SECTION      CHOLECYSTECTOMY      COSMETIC SURGERY      "tummy tuck"    [address]  2018    GASTRIC BYPASS      HYSTERECTOMY      LAPAROSCOPY      TUNNELED VENOUS PORT PLACEMENT N/A 2018    Procedure: INSERTION VENOUS PORT (PORT-A-CATH);   Surgeon: Regla Sears DO;  Location: [facility];  Service: General       Current Outpatient Medications:     AMILoride 5 mg tablet, Take 1 tablet (5 mg total) by mouth daily, Disp: 30 tablet, Rfl: 0    amitriptyline (ELAVIL) 50 mg tablet, Take 50 mg by mouth daily at bedtime, Disp: , Rfl:     B Complex Vitamins (VITAMIN B COMPLEX PO), Take 1 capsule by mouth, Disp: , Rfl:     Catheters MISC, by Does not apply route 2 (two) times a week [employer] care per [place] protocol, Disp: 999 each, Rfl: 11    cyanocobalamin 1,000 mcg/mL, Inject 1 mL (1,000 mcg total) into a muscle every 30 (thirty) days, Disp: 1 mL, Rfl: 12    dicyclomine (BENTYL) 10 mg capsule, Take 10 mg by mouth, Disp: , Rfl:     ergocalciferol (ERGOCALCIFEROL) 1 25 MG (08931 UT) capsule, Take 1 capsule (50,000 Units total) by mouth 2 (two) times a week Patient takes this med on Mondays (Patient taking differently: Take 50,000 Units by mouth once a week Mondays and Thursdays), Disp: 60 capsule, Rfl: 5    escitalopram (LEXAPRO) 10 mg tablet, Take 20 mg by mouth daily at bedtime , Disp: , Rfl:     ferrous sulfate 325 (65 Fe) mg tablet, Take 325 mg by mouth every other day Last took 3/9/20, Disp: , Rfl:     magnesium oxide (MAG-OX) 400 mg tablet, Take 400 mg by mouth, Disp: , Rfl:     potassium chloride 0 4 mEq/mL, Infuse 200 mL (80 mEq total) into a venous catheter daily 80 meq in 1 litre bag to be infused daily over 8 hours, Disp: 2800 mL, Rfl: 12    Syringe/Needle, Disp, (SYRINGE 3CC/25GX5/8") 25G X 5/8" 3 ML MISC, Use once monthly for B12 injection  , Disp: 1 each, Rfl: 11    cyclobenzaprine (FLEXERIL) 10 mg tablet, Take 10 mg by mouth 3 (three) times a day, Disp: , Rfl:   No current facility-administered medications for this visit       Lab Results   Component Value Date    SODIUM 137 03/22/2021    K 3 7 03/22/2021     03/22/2021    CO2 22 03/22/2021    AGAP 9 03/22/2021    BUN 10 03/22/2021    CREATININE 1 32 (H) 03/22/2021    GLUC 61 (L) 03/22/2021    GLUF 156 (H) 02/19/2021    CALCIUM 8 4 03/22/2021    AST 18 02/20/2021    ALT 16 02/20/2021    ALKPHOS 49 02/20/2021    TP 6 3 (L) 02/20/2021    TBILI 0 20 02/20/2021    EGFR 52 03/22/2021     Lab Results   Component Value Date    WBC 5 95 02/20/2021    HGB 10 0 (L) 02/20/2021    HCT 32 7 (L) 02/20/2021    MCV 85 02/20/2021     02/20/2021     Lab Results   Component Value Date    CHOLESTEROL 161 02/19/2021     Lab Results Component Value Date    HDL 70 02/19/2021     Lab Results   Component Value Date    LDLCALC 70 02/19/2021     Lab Results   Component Value Date    TRIG 107 02/19/2021     No results found for: [place], [place]  Lab Results   Component Value Date    KSD4AOSMQAUV 1 119 02/26/2020     Lab Results   Component Value Date    CALCIUM 8 4 03/22/2021    PHOS 4 5 02/20/2021     Lab Results   Component Value Date    SPEP  07/11/2018     The serum total protein and albumin are decreased  The serum protein electrophoresis shows a decreased beta-1 region  No monoclonal bands noted  Reviewed by: Manuelito Walker MD  ([id number])  **Electronic Signature**    UPEP  07/12/2018     The urine total protein and electrophoresis are within normal limits  No monoclonal bands noted  Reviewed by: Manuelito Walker MD ([id number])  **Electronic Signature**     No results found for: [place] [id number]        Objective:      /70   Pulse 103   Ht 5' 3" (1 6 m)   Wt 62 1 kg (137 lb)   LMP  (LMP Unknown)   SpO2 100%   BMI 24 27 kg/m²          Physical Exam  Vitals signs reviewed  Constitutional:       General: She is not in acute distress  Appearance: She is well-developed  HENT:      Head: Normocephalic and atraumatic  Eyes:      Conjunctiva/sclera: Conjunctivae normal    Neck:      Musculoskeletal: Neck supple  Cardiovascular:      Rate and Rhythm: Normal rate and regular rhythm  Pulmonary:      Effort: Pulmonary effort is normal       Breath sounds: Normal breath sounds  Abdominal:      Palpations: Abdomen is soft  Skin:     General: Skin is warm  Findings: No rash  Neurological:      Mental Status: She is alert and oriented to person, place, and time  Cranial Nerves: No cranial nerve deficit     Psychiatric:         Behavior: Behavior normal

## 2021-03-26 NOTE — PATIENT INSTRUCTIONS
Please have labs done in 2 weeks  Please try GasX or other branded medication that works in a similar way

## 2021-03-26 NOTE — ASSESSMENT & PLAN NOTE
Potassium level is appropriate, continue with current care  Patient currently has 80 mEq of IV potassium infused on a daily basis

## 2021-04-21 ENCOUNTER — HOSPITAL ENCOUNTER (EMERGENCY)
Facility: HOSPITAL | Age: 36
Discharge: HOME | End: 2021-04-21
Attending: STUDENT IN AN ORGANIZED HEALTH CARE EDUCATION/TRAINING PROGRAM | Admitting: STUDENT IN AN ORGANIZED HEALTH CARE EDUCATION/TRAINING PROGRAM

## 2021-04-21 ENCOUNTER — APPOINTMENT (EMERGENCY)
Dept: CT IMAGING | Facility: HOSPITAL | Age: 36
End: 2021-04-21

## 2021-04-21 ENCOUNTER — APPOINTMENT (EMERGENCY)
Dept: RADIOLOGY | Facility: HOSPITAL | Age: 36
End: 2021-04-21

## 2021-04-21 VITALS
HEART RATE: 81 BPM | RESPIRATION RATE: 20 BRPM | TEMPERATURE: 98.2 F | BODY MASS INDEX: 24.37 KG/M2 | OXYGEN SATURATION: 100 % | WEIGHT: 137.57 LBS | DIASTOLIC BLOOD PRESSURE: 62 MMHG | SYSTOLIC BLOOD PRESSURE: 114 MMHG

## 2021-04-21 DIAGNOSIS — R10.32 LLQ ABDOMINAL PAIN: Primary | ICD-10-CM

## 2021-04-21 LAB
ALBUMIN SERPL BCP-MCNC: 3.3 G/DL (ref 3.5–5)
ALP SERPL-CCNC: 41 U/L (ref 46–116)
ALT SERPL W P-5'-P-CCNC: 31 U/L (ref 12–78)
ANION GAP SERPL CALCULATED.3IONS-SCNC: 8 MMOL/L (ref 4–13)
AST SERPL W P-5'-P-CCNC: 29 U/L (ref 5–45)
ATRIAL RATE: 92 BPM
BASOPHILS # BLD AUTO: 0.05 THOUSANDS/ΜL (ref 0–0.1)
BASOPHILS NFR BLD AUTO: 1 % (ref 0–1)
BILIRUB SERPL-MCNC: 0.18 MG/DL (ref 0.2–1)
BILIRUB UR QL STRIP: NEGATIVE
BUN SERPL-MCNC: 12 MG/DL (ref 5–25)
CALCIUM ALBUM COR SERPL-MCNC: 8.5 MG/DL (ref 8.3–10.1)
CALCIUM SERPL-MCNC: 7.9 MG/DL (ref 8.3–10.1)
CHLORIDE SERPL-SCNC: 103 MMOL/L (ref 100–108)
CLARITY UR: CLEAR
CO2 SERPL-SCNC: 26 MMOL/L (ref 21–32)
COLOR UR: YELLOW
CREAT SERPL-MCNC: 0.95 MG/DL (ref 0.6–1.3)
EOSINOPHIL # BLD AUTO: 0.03 THOUSAND/ΜL (ref 0–0.61)
EOSINOPHIL NFR BLD AUTO: 1 % (ref 0–6)
ERYTHROCYTE [DISTWIDTH] IN BLOOD BY AUTOMATED COUNT: 16.2 % (ref 11.6–15.1)
GFR SERPL CREATININE-BSD FRML MDRD: 77 ML/MIN/1.73SQ M
GLUCOSE SERPL-MCNC: 90 MG/DL (ref 65–140)
GLUCOSE UR STRIP-MCNC: NEGATIVE MG/DL
HCT VFR BLD AUTO: 33.7 % (ref 34.8–46.1)
HGB BLD-MCNC: 10.4 G/DL (ref 11.5–15.4)
HGB UR QL STRIP.AUTO: NEGATIVE
IMM GRANULOCYTES # BLD AUTO: 0.01 THOUSAND/UL (ref 0–0.2)
IMM GRANULOCYTES NFR BLD AUTO: 0 % (ref 0–2)
KETONES UR STRIP-MCNC: NEGATIVE MG/DL
LACTATE SERPL-SCNC: 1.2 MMOL/L (ref 0.5–2)
LEUKOCYTE ESTERASE UR QL STRIP: NEGATIVE
LIPASE SERPL-CCNC: 193 U/L (ref 73–393)
LYMPHOCYTES # BLD AUTO: 1.6 THOUSANDS/ΜL (ref 0.6–4.47)
LYMPHOCYTES NFR BLD AUTO: 25 % (ref 14–44)
MAGNESIUM SERPL-MCNC: 2.1 MG/DL (ref 1.6–2.6)
MCH RBC QN AUTO: 24.9 PG (ref 26.8–34.3)
MCHC RBC AUTO-ENTMCNC: 30.9 G/DL (ref 31.4–37.4)
MCV RBC AUTO: 81 FL (ref 82–98)
MONOCYTES # BLD AUTO: 0.51 THOUSAND/ΜL (ref 0.17–1.22)
MONOCYTES NFR BLD AUTO: 8 % (ref 4–12)
NEUTROPHILS # BLD AUTO: 4.3 THOUSANDS/ΜL (ref 1.85–7.62)
NEUTS SEG NFR BLD AUTO: 65 % (ref 43–75)
NITRITE UR QL STRIP: NEGATIVE
NRBC BLD AUTO-RTO: 0 /100 WBCS
P AXIS: 73 DEGREES
PH UR STRIP.AUTO: 6.5 [PH]
PLATELET # BLD AUTO: 306 THOUSANDS/UL (ref 149–390)
PMV BLD AUTO: 10.6 FL (ref 8.9–12.7)
POTASSIUM SERPL-SCNC: 3.3 MMOL/L (ref 3.5–5.3)
PR INTERVAL: 122 MS
PROT SERPL-MCNC: 6.5 G/DL (ref 6.4–8.2)
PROT UR STRIP-MCNC: NEGATIVE MG/DL
QRS AXIS: 62 DEGREES
QRSD INTERVAL: 92 MS
QT INTERVAL: 332 MS
QTC INTERVAL: 410 MS
RBC # BLD AUTO: 4.18 MILLION/UL (ref 3.81–5.12)
SODIUM SERPL-SCNC: 137 MMOL/L (ref 136–145)
SP GR UR STRIP.AUTO: <=1.005 (ref 1–1.03)
T WAVE AXIS: 81 DEGREES
TROPONIN I SERPL-MCNC: <0.02 NG/ML
UROBILINOGEN UR QL STRIP.AUTO: 0.2 E.U./DL
VENTRICULAR RATE: 92 BPM
WBC # BLD AUTO: 6.5 THOUSAND/UL (ref 4.31–10.16)

## 2021-04-21 PROCEDURE — 99285 EMERGENCY DEPT VISIT HI MDM: CPT

## 2021-04-21 PROCEDURE — 83605 ASSAY OF LACTIC ACID: CPT | Performed by: PHYSICIAN ASSISTANT

## 2021-04-21 PROCEDURE — 83735 ASSAY OF MAGNESIUM: CPT | Performed by: PHYSICIAN ASSISTANT

## 2021-04-21 PROCEDURE — 36415 COLL VENOUS BLD VENIPUNCTURE: CPT

## 2021-04-21 PROCEDURE — 96361 HYDRATE IV INFUSION ADD-ON: CPT

## 2021-04-21 PROCEDURE — 96374 THER/PROPH/DIAG INJ IV PUSH: CPT

## 2021-04-21 PROCEDURE — 81003 URINALYSIS AUTO W/O SCOPE: CPT | Performed by: PHYSICIAN ASSISTANT

## 2021-04-21 PROCEDURE — 71045 X-RAY EXAM CHEST 1 VIEW: CPT

## 2021-04-21 PROCEDURE — 83690 ASSAY OF LIPASE: CPT | Performed by: STUDENT IN AN ORGANIZED HEALTH CARE EDUCATION/TRAINING PROGRAM

## 2021-04-21 PROCEDURE — 80053 COMPREHEN METABOLIC PANEL: CPT | Performed by: STUDENT IN AN ORGANIZED HEALTH CARE EDUCATION/TRAINING PROGRAM

## 2021-04-21 PROCEDURE — 93005 ELECTROCARDIOGRAM TRACING: CPT

## 2021-04-21 PROCEDURE — 96375 TX/PRO/DX INJ NEW DRUG ADDON: CPT

## 2021-04-21 PROCEDURE — 85025 COMPLETE CBC W/AUTO DIFF WBC: CPT | Performed by: STUDENT IN AN ORGANIZED HEALTH CARE EDUCATION/TRAINING PROGRAM

## 2021-04-21 PROCEDURE — 74177 CT ABD & PELVIS W/CONTRAST: CPT

## 2021-04-21 PROCEDURE — 99285 EMERGENCY DEPT VISIT HI MDM: CPT | Performed by: PHYSICIAN ASSISTANT

## 2021-04-21 PROCEDURE — 84484 ASSAY OF TROPONIN QUANT: CPT | Performed by: PHYSICIAN ASSISTANT

## 2021-04-21 RX ORDER — ONDANSETRON 2 MG/ML
4 INJECTION INTRAMUSCULAR; INTRAVENOUS ONCE
Status: COMPLETED | OUTPATIENT
Start: 2021-04-21 | End: 2021-04-21

## 2021-04-21 RX ORDER — OXYCODONE HYDROCHLORIDE AND ACETAMINOPHEN 5; 325 MG/1; MG/1
1 TABLET ORAL EVERY 4 HOURS PRN
Qty: 12 TABLET | Refills: 0 | Status: SHIPPED | OUTPATIENT
Start: 2021-04-21 | End: 2021-04-24

## 2021-04-21 RX ORDER — POTASSIUM CHLORIDE 20 MEQ/1
40 TABLET, EXTENDED RELEASE ORAL ONCE
Status: COMPLETED | OUTPATIENT
Start: 2021-04-21 | End: 2021-04-21

## 2021-04-21 RX ORDER — MORPHINE SULFATE 4 MG/ML
4 INJECTION, SOLUTION INTRAMUSCULAR; INTRAVENOUS ONCE
Status: COMPLETED | OUTPATIENT
Start: 2021-04-21 | End: 2021-04-21

## 2021-04-21 RX ORDER — HYDROMORPHONE HCL/PF 1 MG/ML
0.5 SYRINGE (ML) INJECTION ONCE
Status: COMPLETED | OUTPATIENT
Start: 2021-04-21 | End: 2021-04-21

## 2021-04-21 RX ADMIN — ONDANSETRON 4 MG: 2 INJECTION INTRAMUSCULAR; INTRAVENOUS at 09:59

## 2021-04-21 RX ADMIN — SODIUM CHLORIDE 500 ML: 0.9 INJECTION, SOLUTION INTRAVENOUS at 09:59

## 2021-04-21 RX ADMIN — HYDROMORPHONE HYDROCHLORIDE 0.5 MG: 1 INJECTION, SOLUTION INTRAMUSCULAR; INTRAVENOUS; SUBCUTANEOUS at 12:57

## 2021-04-21 RX ADMIN — IOHEXOL 100 ML: 350 INJECTION, SOLUTION INTRAVENOUS at 11:37

## 2021-04-21 RX ADMIN — MORPHINE SULFATE 4 MG: 4 INJECTION INTRAVENOUS at 10:05

## 2021-04-21 RX ADMIN — POTASSIUM CHLORIDE 40 MEQ: 1500 TABLET, EXTENDED RELEASE ORAL at 12:56

## 2021-04-21 NOTE — DISCHARGE INSTRUCTIONS
NARCOTIC PAIN MEDICINE INSTRUCTIONS:    You have been prescribed a narcotic pain medicine  This type of medicine is not like other medications and you should understand additional information regarding it  You have been provided only a limited supply of this medicine and if you need more you must seen your Primary Care Provider &/or your Pain Therapist, if you have one  Please read the following information  If you have any questions, please do not leave here until you have your questions answered  1   The pain medicine will not likely make all of your pain go away, but it will hopefully take the edge off of your pain  2   Use the narcotic pain medicine only as directed! Be sure to avoid taking this medicine on an empty stomach  Keep yourself well hydrated when taking this medicine  3   Narcotic can cause sedation (sleepiness) and delayed reactions which puts you and those around you at risk of injury or death  You must not drive any vehicle, operate any machinery, make any important/life-changing or legal decisions, participate in physical activities, drink alcohol or take any other medicines that can cause drossiness  4   If you feel excessively tired or unable to coordinate your activities DO NOT TAKE any more of this medication! 5  Narcotic pain medications taken regularly will cause constipation  If you need to take this medication regularly use an over-the-counter stool softener such as docusate, (Colace) or laxatives called Milk of Magnesia or Mirilax following the 's directions  6    While taking the narcotic pain medications keep it in a secure location in your home  Avoid leaving them in common areas where others can mistakenly take them- including unsuspecting children  Also, please understand that narcotics are a sought after "street drug," and criminals may seek to steal them from you    If you have this medicine stolen, please contact local law enforcement and have a report of the event filed  Ask to have a copy of the report because your healthcare providers will want to review it  7   Any remaining tablets or pills must be disposed of carefully  Most pharmacies will accept returned tablets or pills  They will destroy them using safe methods  Do not flush them down the toilet!

## 2021-04-21 NOTE — ED PROVIDER NOTES
History  Chief Complaint   Patient presents with    Abdominal Pain     Pt w/hs of gastric bypass c/o N,V,D and SBXFGIE radiating to back for the past 6 weeks - has seen PCP, GI and Urology for same - states pain intensified last night and was unable to sleep     The patient a 76-year-old female with a past medical history of gastric bypass Roshan-en-Y procedure 2014, potassium deficiency requiring infusions daily who presents emergency department today with a chief complaint of left lower quadrant abdominal pain  Patient states that this has been going on for several weeks at home however has been gradually worsening  Patient has been taking Tylenol and Bentyl without relief  Patient has seen GI, Nephrology, her gastric bypass surgeon and PCP  Patient admits to nausea without vomiting  Patient does take Zofran at home for relief of this  Patient admits to history of hysterectomy, lap choly, appendectomy  She denies any dysuria or hematuria, falls or traumas, fevers or chills        Abdominal Pain  Pain location:  LLQ  Pain quality: cramping    Pain radiates to:  Does not radiate  Pain severity:  Moderate  Onset quality:  Gradual  Timing:  Constant  Progression:  Worsening  Chronicity:  New  Context: not diet changes, not laxative use, not recent illness and not trauma    Relieved by:  Nothing  Worsened by:  Nothing  Ineffective treatments:  Acetaminophen, OTC medications and position changes  Associated symptoms: nausea    Associated symptoms: no anorexia, no belching, no chest pain, no chills, no constipation, no cough, no diarrhea, no dysuria, no fever, no flatus, no hematemesis, no hematochezia, no hematuria, no shortness of breath, no sore throat and no vomiting    Nausea:     Severity:  Mild    Onset quality:  Unable to specify    Timing:  Intermittent    Progression:  Waxing and waning  Risk factors: multiple surgeries    Risk factors: not pregnant and no recent hospitalization        Prior to Admission Medications   Prescriptions Last Dose Informant Patient Reported? Taking? AMILoride 5 mg tablet  Self No No   Sig: Take 1 tablet (5 mg total) by mouth daily   B Complex Vitamins (VITAMIN B COMPLEX PO)   Yes No   Sig: Take 1 capsule by mouth   Catheters MISC   No No   Sig: by Does not apply route 2 (two) times a week Port care per [employer], Disp, (SYRINGE 3CC/25GX5/8") 25G X 5/8" 3 ML MISC   No No   Sig: Use once monthly for B12 injection     amitriptyline (ELAVIL) 50 mg tablet   Yes No   Sig: Take 50 mg by mouth daily at bedtime   cyanocobalamin 1,000 mcg/mL   No No   Sig: Inject 1 mL (1,000 mcg total) into a muscle every 30 (thirty) days   cyclobenzaprine (FLEXERIL) 10 mg tablet   Yes No   Sig: Take 10 mg by mouth 3 (three) times a day   dicyclomine (BENTYL) 10 mg capsule   Yes No   Sig: Take 10 mg by mouth   ergocalciferol (ERGOCALCIFEROL) 1 25 MG (90707 UT) capsule   No No   Sig: Take 1 capsule (50,000 Units total) by mouth 2 (two) times a week Patient takes this med on    Patient taking differently: Take 50,000 Units by mouth once a week  and    escitalopram (LEXAPRO) 10 mg tablet  Self Yes No   Sig: Take 20 mg by mouth daily at bedtime    ferrous sulfate 325 (65 Fe) mg tablet   Yes No   Sig: Take 325 mg by mouth every other day Last took 3/9/20   magnesium oxide (MAG-OX) 400 mg tablet   Yes No   Sig: Take 400 mg by mouth   potassium chloride 0 4 mEq/mL   No No   Sig: Infuse 200 mL (80 mEq total) into a venous catheter daily 80 meq in 1 litre bag to be infused daily over 8 hours      Facility-Administered Medications: None       Past Medical History:   Diagnosis Date    H  pylori infection     Hypokalemia     Left lower quadrant abdominal pain 2020    Migraine     Renal disorder     Rhabdomyolysis        Past Surgical History:   Procedure Laterality Date    ABDOMINAL SURGERY      APPENDECTOMY       SECTION      CHOLECYSTECTOMY      COSMETIC SURGERY      "tummy tuck"    FL GUIDED CENTRAL VENOUS ACCESS DEVICE INSERTION  12/21/2018    GASTRIC BYPASS      HYSTERECTOMY      LAPAROSCOPY      TUNNELED VENOUS PORT PLACEMENT N/A 12/21/2018    Procedure: INSERTION VENOUS PORT (PORT-A-CATH); Surgeon: Estrellita Talavera DO;  Location: [facility];  Service: General       Family History   Problem Relation Age of Onset    No Known Problems Mother     Hypertension Father     Diabetes Father     Diabetes Maternal Grandfather      I have reviewed and agree with the history as documented  E-Cigarette/Vaping    E-Cigarette Use Never User      E-Cigarette/Vaping Substances    Nicotine No     THC No     CBD No      Social History     Tobacco Use    Smoking status: Never Smoker    Smokeless tobacco: Never Used   Substance Use Topics    Alcohol use: Never     Alcohol/week: 0 0 standard drinks     Frequency: Never     Drinks per session: Patient refused     Binge frequency: Never     Comment: 0    Drug use: Never       Review of Systems   Constitutional: Negative for chills and fever  HENT: Negative for ear pain and sore throat  Eyes: Negative for pain and visual disturbance  Respiratory: Negative for cough and shortness of breath  Cardiovascular: Negative for chest pain and palpitations  Gastrointestinal: Positive for abdominal pain and nausea  Negative for anorexia, constipation, diarrhea, flatus, hematemesis, hematochezia and vomiting  Genitourinary: Negative for dysuria and hematuria  Musculoskeletal: Negative for arthralgias and back pain  Skin: Negative for color change and rash  Neurological: Negative for seizures and syncope  All other systems reviewed and are negative  Physical Exam  Physical Exam  Vitals signs and nursing note reviewed  Constitutional:       General: She is not in acute distress  Appearance: She is well-developed  HENT:      Head: Normocephalic and atraumatic     Eyes:      Extraocular Movements: Extraocular movements intact  Conjunctiva/sclera: Conjunctivae normal       Pupils: Pupils are equal, round, and reactive to light  Neck:      Musculoskeletal: Neck supple  Cardiovascular:      Rate and Rhythm: Normal rate and regular rhythm  Heart sounds: Normal heart sounds  No murmur  Pulmonary:      Effort: Pulmonary effort is normal  No respiratory distress  Breath sounds: Normal breath sounds  Abdominal:      Palpations: Abdomen is soft  Tenderness: There is abdominal tenderness in the left lower quadrant  There is no right CVA tenderness or left CVA tenderness  Hernia: No hernia is present  Skin:     General: Skin is warm and dry  Capillary Refill: Capillary refill takes less than 2 seconds  Neurological:      General: No focal deficit present  Mental Status: She is alert and oriented to person, place, and time           Vital Signs  ED Triage Vitals   Temperature Pulse Respirations Blood Pressure SpO2   04/21/21 0932 04/21/21 0928 04/21/21 0928 04/21/21 0928 04/21/21 0928   98 2 °F (36 8 °C) 105 20 139/75 100 %      Temp Source Heart Rate Source Patient Position - Orthostatic VS BP Location FiO2 (%)   04/21/21 0928 04/21/21 0928 04/21/21 0928 04/21/21 0928 --   Temporal Monitor Sitting Right arm       Pain Score       04/21/21 0928       Worst Possible Pain           Vitals:    04/21/21 1200 04/21/21 1230 04/21/21 1245 04/21/21 1318   BP: 118/68 114/59 119/56 114/62   Pulse: 74 74 74 81   Patient Position - Orthostatic VS: Lying Lying Lying Sitting         Visual Acuity      ED Medications  Medications   ondansetron (ZOFRAN) injection 4 mg (4 mg Intravenous Given 4/21/21 0959)   sodium chloride 0 9 % bolus 500 mL (0 mL Intravenous Stopped 4/21/21 1159)   morphine (PF) 4 mg/mL injection 4 mg (4 mg Intravenous Given 4/21/21 1005)   iohexol (OMNIPAQUE) 240 MG/ML solution 50 mL (50 mL Oral Given 4/21/21 1137)   iohexol (OMNIPAQUE) 350 MG/ML injection (SINGLE-DOSE) 100 mL (100 mL Intravenous Given 4/21/21 1137)   potassium chloride (K-DUR,KLOR-CON) CR tablet 40 mEq (40 mEq Oral Given 4/21/21 1256)   HYDROmorphone (DILAUDID) injection 0 5 mg (0 5 mg Intravenous Given 4/21/21 1257)       Diagnostic Studies  Results Reviewed     Procedure Component Value Units Date/Time    UA w Reflex to Microscopic w Reflex to Culture [[id number]] Collected: 04/21/21 1129    Lab Status: Final result Specimen: Urine, Clean Catch Updated: 04/21/21 1134     Color, UA Yellow     Clarity, UA Clear     Specific Gravity, UA <=1 005     pH, UA 6 5     Leukocytes, UA Negative     Nitrite, UA Negative     Protein, UA Negative mg/dl      Glucose, UA Negative mg/dl      Ketones, UA Negative mg/dl      Urobilinogen, UA 0 2 E U /dl      Bilirubin, UA Negative     Blood, UA Negative    Lactic acid [[id number]]  (Normal) Collected: 04/21/21 0958    Lab Status: Final result Specimen: Blood from Line, Venous Updated: 04/21/21 1028     LACTIC ACID 1 2 mmol/L     Narrative:      Result may be elevated if tourniquet was used during collection      Troponin I [[id number]]  (Normal) Collected: 04/21/21 0958    Lab Status: Final result Specimen: Blood from Line, Venous Updated: 04/21/21 1026     Troponin I <0 02 ng/mL     Comprehensive metabolic panel [[id number]]  (Abnormal) Collected: 04/21/21 0958    Lab Status: Final result Specimen: Blood from Line, Venous Updated: 04/21/21 1020     Sodium 137 mmol/L      Potassium 3 3 mmol/L      Chloride 103 mmol/L      CO2 26 mmol/L      ANION GAP 8 mmol/L      BUN 12 mg/dL      Creatinine 0 95 mg/dL      Glucose 90 mg/dL      Calcium 7 9 mg/dL      Corrected Calcium 8 5 mg/dL      AST 29 U/L      ALT 31 U/L      Alkaline Phosphatase 41 U/L      Total Protein 6 5 g/dL      Albumin 3 3 g/dL      Total Bilirubin 0 18 mg/dL      eGFR 77 ml/min/1 73sq m     Narrative:      [place] guidelines for Chronic Kidney Disease (CKD):     Stage 1 with normal or high GFR (GFR > 90 mL/min/1 73 square meters)    Stage 2 Mild CKD (GFR = 60-89 mL/min/1 73 square meters)    Stage 3A Moderate CKD (GFR = 45-59 mL/min/1 73 square meters)    Stage 3B Moderate CKD (GFR = 30-44 mL/min/1 73 square meters)    Stage 4 Severe CKD (GFR = 15-29 mL/min/1 73 square meters)    Stage 5 End Stage CKD (GFR <15 mL/min/1 73 square meters)  Note: GFR calculation is accurate only with a steady state creatinine    Lipase [[id number]]  (Normal) Collected: 04/21/21 0958    Lab Status: Final result Specimen: Blood from Line, Venous Updated: 04/21/21 1020     Lipase 193 u/L     Magnesium [[id number]]  (Normal) Collected: 04/21/21 0958    Lab Status: Final result Specimen: Blood from Line, Venous Updated: 04/21/21 1020     Magnesium 2 1 mg/dL     CBC and differential [[id number]]  (Abnormal) Collected: 04/21/21 0958    Lab Status: Final result Specimen: Blood from Line, Venous Updated: 04/21/21 1007     WBC 6 50 Thousand/uL      RBC 4 18 Million/uL      Hemoglobin 10 4 g/dL      Hematocrit 33 7 %      MCV 81 fL      MCH 24 9 pg      MCHC 30 9 g/dL      RDW 16 2 %      MPV 10 6 fL      Platelets 645 Thousands/uL      nRBC 0 /100 WBCs      Neutrophils Relative 65 %      Immat GRANS % 0 %      Lymphocytes Relative 25 %      Monocytes Relative 8 %      Eosinophils Relative 1 %      Basophils Relative 1 %      Neutrophils Absolute 4 30 Thousands/µL      Immature Grans Absolute 0 01 Thousand/uL      Lymphocytes Absolute 1 60 Thousands/µL      Monocytes Absolute 0 51 Thousand/µL      Eosinophils Absolute 0 03 Thousand/µL      Basophils Absolute 0 05 Thousands/µL                  CT abdomen pelvis with contrast   Final Result by Bethanie Valdez DO (04/21 1256)      Multiple air-fluid filled loops of dilated large bowel, similar in appearance to multiple prior examinations  Otherwise, no acute abnormality              Workstation performed: [id number]         XR chest 1 view portable   Final Result by Bharathi Gabriel MD (04/21 1715)      No acute cardiopulmonary disease  Workstation performed: [id number]                    Procedures  Procedures         ED Course                             SBIRT 22yo+      Most Recent Value   SBIRT (22 yo +)   In order to provide better care to our patients, we are screening all of our patients for alcohol and drug use  Would it be okay to ask you these screening questions? Yes Filed at: 04/21/2021 0955   Initial Alcohol Screen: US AUDIT-C    1  How often do you have a drink containing alcohol?  0 Filed at: 04/21/2021 0955   2  How many drinks containing alcohol do you have on a typical day you are drinking? 0 Filed at: 04/21/2021 0955   3b  FEMALE Any Age, or MALE 65+: How often do you have 4 or more drinks on one occassion? 0 Filed at: 04/21/2021 0955   Audit-C Score  0 Filed at: 04/21/2021 5941   SLIME: How many times in the past year have you    Used an illegal drug or used a prescription medication for non-medical reasons? Never Filed at: 04/21/2021 0955                    MDM  Number of Diagnoses or Management Options  LLQ abdominal pain:   Diagnosis management comments: Labs illustrated hypokalemia however patient does receive nightly infusions of IV potassium through her port so p o  Potassium was given emergency department  Patient was given IV fluids as well as Zofran in the emergency department  Patient was given pain control  Labs relatively unremarkable other than the hypokalemia  CT scan imaging was obtained with IV and p o  Contrast which was unremarkable for any acute abnormality    Patient was instructed to seek follow-up care with her bariatric surgeon and she expressed understanding was in agreement with treatment plan       Amount and/or Complexity of Data Reviewed  Clinical lab tests: ordered and reviewed  Tests in the radiology section of CPT®: ordered and reviewed  Decide to obtain previous medical records or to obtain history from someone other than the patient: yes  Review and summarize past medical records: yes  Independent visualization of images, tracings, or specimens: yes    Risk of Complications, Morbidity, and/or Mortality  Presenting problems: moderate  Diagnostic procedures: moderate  Management options: moderate    Patient Progress  Patient progress: stable      Disposition  Final diagnoses:   LLQ abdominal pain     Time reflects when diagnosis was documented in both MDM as applicable and the Disposition within this note     Time User Action Codes Description Comment    4/21/2021  1:03 PM Jorge Bhagat Add [R10 32] LLQ abdominal pain       ED Disposition     ED Disposition Condition Date/Time Comment    Discharge Stable Wed Apr 21, 2021  1:03 PM Hannah Mechelle discharge to home/self care              Follow-up Information    None         Discharge Medication List as of 4/21/2021  1:05 PM      START taking these medications    Details   oxyCODONE-acetaminophen (PERCOCET) 5-325 mg per tablet Take 1 tablet by mouth every 4 (four) hours as needed for moderate pain for up to 3 daysMax Daily Amount: 6 tablets, Starting Wed 4/21/2021, Until Sat 4/24/2021, Normal         CONTINUE these medications which have NOT CHANGED    Details   AMILoride 5 mg tablet Take 1 tablet (5 mg total) by mouth daily, Starting Wed 2/20/2019, Normal      amitriptyline (ELAVIL) 50 mg tablet Take 50 mg by mouth daily at bedtime, Starting Mon 8/27/2018, Historical Med      B Complex Vitamins (VITAMIN B COMPLEX PO) Take 1 capsule by mouth, Historical Med      Catheters MISC by Does not apply route 2 (two) times a week Port care per [employer], Starting Thu 5/7/2020, No Print      cyanocobalamin 1,000 mcg/mL Inject 1 mL (1,000 mcg total) into a muscle every 30 (thirty) days, Starting Mon 5/4/2020, Normal      cyclobenzaprine (FLEXERIL) 10 mg tablet Take 10 mg by mouth 3 (three) times a day, Historical Med      dicyclomine (BENTYL) 10 mg capsule Take 10 mg by mouth, Starting Wed 3/17/2021, Until Wed 3/31/2021, Historical Med      ergocalciferol (ERGOCALCIFEROL) 1 25 MG (97806 UT) capsule Take 1 capsule (50,000 Units total) by mouth 2 (two) times a week Patient takes this med on Mondays, Starting Thu 3/26/2020, Normal      escitalopram (LEXAPRO) 10 mg tablet Take 20 mg by mouth daily at bedtime , Historical Med      ferrous sulfate 325 (65 Fe) mg tablet Take 325 mg by mouth every other day Last took 3/9/20, Historical Med      magnesium oxide (MAG-OX) 400 mg tablet Take 400 mg by mouth, Starting Fri 7/24/2020, Until Sat 7/24/2021, Historical Med      potassium chloride 0 4 mEq/mL Infuse 200 mL (80 mEq total) into a venous catheter daily 80 meq in 1 litre bag to be infused daily over 8 hours, Starting Thu 1/14/2021, Normal      Syringe/Needle, Disp, (SYRINGE 3CC/25GX5/8") 25G X 5/8" 3 ML MISC Use once monthly for B12 injection  , Normal           No discharge procedures on file      PDMP Review       Value Time User    PDMP Reviewed  Yes 4/21/2021  1:04 PM Ramiro León PA-C          ED Provider  Electronically Signed by           Ramiro León PA-C  04/21/21 0507

## 2021-05-05 ENCOUNTER — TELEPHONE (OUTPATIENT)
Dept: NEPHROLOGY | Facility: CLINIC | Age: 36
End: 2021-05-05

## 2021-05-05 NOTE — TELEPHONE ENCOUNTER
Sandy Michael from [facility] calling in wanting to let you know that patient has missed a couple of doses of her potassium infusoins because she had an insurance change and they are awaiting an approval from her new insurance company  She said once shes approved again she will call our office and let us know the exact number of doses missed  She was just calling in to inform you

## 2021-05-10 DIAGNOSIS — R10.12 LEFT UPPER QUADRANT PAIN: ICD-10-CM

## 2021-05-11 ENCOUNTER — TELEPHONE (OUTPATIENT)
Dept: NEPHROLOGY | Facility: CLINIC | Age: 36
End: 2021-05-11

## 2021-05-11 DIAGNOSIS — E87.6 HYPOKALEMIA: ICD-10-CM

## 2021-05-11 RX ORDER — POTASSIUM CHLORIDE 400 MEQ/1000ML
80 INJECTION, SOLUTION INTRAVENOUS DAILY
Qty: 2800 ML | Refills: 12 | Status: SHIPPED | OUTPATIENT
Start: 2021-05-11

## 2021-05-11 RX ORDER — POTASSIUM CHLORIDE 400 MEQ/1000ML
80 INJECTION, SOLUTION INTRAVENOUS DAILY
Qty: 2800 ML | Refills: 12
Start: 2021-05-11 | End: 2021-05-11 | Stop reason: SDUPTHER

## 2021-05-11 NOTE — TELEPHONE ENCOUNTER
Sandra from [facility] called back and unfortunately they do not take patients new insurance  She said that [facility] does  Called pt to make sure it was ok to proceed and she said yes  She will send an updated picture of her insurance card through My-chart so I can forward to [facility]  They will need office notes, hospital notes, and labs for the last year  They also need a referral for home infusions and an updated printed script for the potassium  Once orders are placed I can fax it to them and they will get started on the prior authorization  Spoke with Favian Tovar and she hasn't had potassium infusion in a couple weeks  She will go for blood work tomorrow and she said she is trying to watch what she eats

## 2021-05-12 ENCOUNTER — APPOINTMENT (OUTPATIENT)
Dept: LAB | Facility: HOSPITAL | Age: 36
End: 2021-05-12
Attending: INTERNAL MEDICINE
Payer: COMMERCIAL

## 2021-05-12 DIAGNOSIS — E87.6 HYPOKALEMIA: ICD-10-CM

## 2021-05-12 LAB
ANION GAP SERPL CALCULATED.3IONS-SCNC: 8 MMOL/L (ref 4–13)
BUN SERPL-MCNC: 12 MG/DL (ref 5–25)
CALCIUM SERPL-MCNC: 8 MG/DL (ref 8.3–10.1)
CHLORIDE SERPL-SCNC: 106 MMOL/L (ref 100–108)
CO2 SERPL-SCNC: 24 MMOL/L (ref 21–32)
CREAT SERPL-MCNC: 0.98 MG/DL (ref 0.6–1.3)
GFR SERPL CREATININE-BSD FRML MDRD: 74 ML/MIN/1.73SQ M
GLUCOSE SERPL-MCNC: 49 MG/DL (ref 65–140)
MAGNESIUM SERPL-MCNC: 2.2 MG/DL (ref 1.6–2.6)
POTASSIUM SERPL-SCNC: 3.5 MMOL/L (ref 3.5–5.3)
SODIUM SERPL-SCNC: 138 MMOL/L (ref 136–145)

## 2021-05-12 PROCEDURE — 80048 BASIC METABOLIC PNL TOTAL CA: CPT

## 2021-05-12 PROCEDURE — 83735 ASSAY OF MAGNESIUM: CPT

## 2021-05-12 PROCEDURE — 36415 COLL VENOUS BLD VENIPUNCTURE: CPT

## 2021-05-12 NOTE — TELEPHONE ENCOUNTER
Jonna from [facility] calling back to inform us that her infusions will be covered at 100%  She is going to set up an appointment with Tracey William to get her scheduled

## 2021-05-17 ENCOUNTER — HOSPITAL ENCOUNTER (OUTPATIENT)
Dept: RADIOLOGY | Facility: HOSPITAL | Age: 36
Discharge: HOME | End: 2021-05-17
Attending: STUDENT IN AN ORGANIZED HEALTH CARE EDUCATION/TRAINING PROGRAM
Payer: COMMERCIAL

## 2021-05-17 DIAGNOSIS — R10.12 LEFT UPPER QUADRANT PAIN: ICD-10-CM

## 2021-05-17 PROCEDURE — 74250 X-RAY XM SM INT 1CNTRST STD: CPT

## 2021-06-02 ENCOUNTER — APPOINTMENT (OUTPATIENT)
Dept: LAB | Facility: HOSPITAL | Age: 36
End: 2021-06-02
Payer: COMMERCIAL

## 2021-06-02 DIAGNOSIS — E87.6 HYPOKALEMIA: Primary | ICD-10-CM

## 2021-06-02 DIAGNOSIS — E87.6 HYPOKALEMIA: ICD-10-CM

## 2021-06-02 LAB
ANION GAP SERPL CALCULATED.3IONS-SCNC: 8 MMOL/L (ref 4–13)
BUN SERPL-MCNC: 9 MG/DL (ref 5–25)
CALCIUM SERPL-MCNC: 8.1 MG/DL (ref 8.3–10.1)
CHLORIDE SERPL-SCNC: 104 MMOL/L (ref 100–108)
CO2 SERPL-SCNC: 23 MMOL/L (ref 21–32)
CREAT SERPL-MCNC: 0.83 MG/DL (ref 0.6–1.3)
GFR SERPL CREATININE-BSD FRML MDRD: 90 ML/MIN/1.73SQ M
GLUCOSE SERPL-MCNC: 80 MG/DL (ref 65–140)
POTASSIUM SERPL-SCNC: 3.8 MMOL/L (ref 3.5–5.3)
SODIUM SERPL-SCNC: 135 MMOL/L (ref 136–145)

## 2021-06-02 PROCEDURE — 80048 BASIC METABOLIC PNL TOTAL CA: CPT

## 2021-06-02 PROCEDURE — 36415 COLL VENOUS BLD VENIPUNCTURE: CPT

## 2021-06-03 ENCOUNTER — TELEPHONE (OUTPATIENT)
Dept: NEPHROLOGY | Facility: CLINIC | Age: 36
End: 2021-06-03

## 2021-06-03 NOTE — TELEPHONE ENCOUNTER
Patient aware of results  She states that the new company that she uses [facility] for her infusions also draws blood work  She is asking if we can put a script in and fax it to them to for weekly blood work      Fax number- [phone]  Phone number- [phone]

## 2021-06-17 ENCOUNTER — HOSPITAL ENCOUNTER (INPATIENT)
Facility: HOSPITAL | Age: 36
LOS: 2 days | Discharge: HOME | DRG: 282 | End: 2021-06-19
Attending: EMERGENCY MEDICINE | Admitting: FAMILY MEDICINE
Payer: COMMERCIAL

## 2021-06-17 ENCOUNTER — APPOINTMENT (EMERGENCY)
Dept: RADIOLOGY | Facility: HOSPITAL | Age: 36
DRG: 282 | End: 2021-06-17
Payer: COMMERCIAL

## 2021-06-17 DIAGNOSIS — R10.32 LEFT LOWER QUADRANT ABDOMINAL PAIN: ICD-10-CM

## 2021-06-17 DIAGNOSIS — R74.8 ELEVATED LIPASE: ICD-10-CM

## 2021-06-17 DIAGNOSIS — R10.9 ABDOMINAL PAIN: Primary | ICD-10-CM

## 2021-06-17 LAB
ALBUMIN SERPL BCP-MCNC: 3.6 G/DL (ref 3.5–5)
ALP SERPL-CCNC: 68 U/L (ref 46–116)
ALT SERPL W P-5'-P-CCNC: 46 U/L (ref 12–78)
ANION GAP SERPL CALCULATED.3IONS-SCNC: 8 MMOL/L (ref 4–13)
AST SERPL W P-5'-P-CCNC: 28 U/L (ref 5–45)
BASOPHILS # BLD AUTO: 0.07 THOUSANDS/ΜL (ref 0–0.1)
BASOPHILS NFR BLD AUTO: 1 % (ref 0–1)
BILIRUB SERPL-MCNC: 0.18 MG/DL (ref 0.2–1)
BUN SERPL-MCNC: 12 MG/DL (ref 5–25)
CALCIUM SERPL-MCNC: 8.3 MG/DL (ref 8.3–10.1)
CHLORIDE SERPL-SCNC: 105 MMOL/L (ref 100–108)
CO2 SERPL-SCNC: 23 MMOL/L (ref 21–32)
CREAT SERPL-MCNC: 0.97 MG/DL (ref 0.6–1.3)
EOSINOPHIL # BLD AUTO: 0.1 THOUSAND/ΜL (ref 0–0.61)
EOSINOPHIL NFR BLD AUTO: 1 % (ref 0–6)
ERYTHROCYTE [DISTWIDTH] IN BLOOD BY AUTOMATED COUNT: 17 % (ref 11.6–15.1)
GFR SERPL CREATININE-BSD FRML MDRD: 75 ML/MIN/1.73SQ M
GLUCOSE SERPL-MCNC: 96 MG/DL (ref 65–140)
HCT VFR BLD AUTO: 33.3 % (ref 34.8–46.1)
HGB BLD-MCNC: 10.2 G/DL (ref 11.5–15.4)
IMM GRANULOCYTES # BLD AUTO: 0.02 THOUSAND/UL (ref 0–0.2)
IMM GRANULOCYTES NFR BLD AUTO: 0 % (ref 0–2)
LACTATE SERPL-SCNC: 1.1 MMOL/L (ref 0.5–2)
LIPASE SERPL-CCNC: 1990 U/L (ref 73–393)
LYMPHOCYTES # BLD AUTO: 2.06 THOUSANDS/ΜL (ref 0.6–4.47)
LYMPHOCYTES NFR BLD AUTO: 25 % (ref 14–44)
MCH RBC QN AUTO: 25.2 PG (ref 26.8–34.3)
MCHC RBC AUTO-ENTMCNC: 30.6 G/DL (ref 31.4–37.4)
MCV RBC AUTO: 82 FL (ref 82–98)
MONOCYTES # BLD AUTO: 0.63 THOUSAND/ΜL (ref 0.17–1.22)
MONOCYTES NFR BLD AUTO: 8 % (ref 4–12)
NEUTROPHILS # BLD AUTO: 5.48 THOUSANDS/ΜL (ref 1.85–7.62)
NEUTS SEG NFR BLD AUTO: 65 % (ref 43–75)
NRBC BLD AUTO-RTO: 0 /100 WBCS
PLATELET # BLD AUTO: 394 THOUSANDS/UL (ref 149–390)
PMV BLD AUTO: 11.3 FL (ref 8.9–12.7)
POTASSIUM SERPL-SCNC: 3.7 MMOL/L (ref 3.5–5.3)
PROT SERPL-MCNC: 6.9 G/DL (ref 6.4–8.2)
RBC # BLD AUTO: 4.05 MILLION/UL (ref 3.81–5.12)
SODIUM SERPL-SCNC: 136 MMOL/L (ref 136–145)
WBC # BLD AUTO: 8.36 THOUSAND/UL (ref 4.31–10.16)

## 2021-06-17 PROCEDURE — 96374 THER/PROPH/DIAG INJ IV PUSH: CPT

## 2021-06-17 PROCEDURE — 80053 COMPREHEN METABOLIC PANEL: CPT | Performed by: EMERGENCY MEDICINE

## 2021-06-17 PROCEDURE — 74022 RADEX COMPL AQT ABD SERIES: CPT

## 2021-06-17 PROCEDURE — 96361 HYDRATE IV INFUSION ADD-ON: CPT

## 2021-06-17 PROCEDURE — 85025 COMPLETE CBC W/AUTO DIFF WBC: CPT | Performed by: EMERGENCY MEDICINE

## 2021-06-17 PROCEDURE — 36415 COLL VENOUS BLD VENIPUNCTURE: CPT | Performed by: EMERGENCY MEDICINE

## 2021-06-17 PROCEDURE — 99285 EMERGENCY DEPT VISIT HI MDM: CPT | Performed by: EMERGENCY MEDICINE

## 2021-06-17 PROCEDURE — 83690 ASSAY OF LIPASE: CPT | Performed by: EMERGENCY MEDICINE

## 2021-06-17 PROCEDURE — 99223 1ST HOSP IP/OBS HIGH 75: CPT | Performed by: FAMILY MEDICINE

## 2021-06-17 PROCEDURE — 83605 ASSAY OF LACTIC ACID: CPT | Performed by: EMERGENCY MEDICINE

## 2021-06-17 PROCEDURE — 99285 EMERGENCY DEPT VISIT HI MDM: CPT

## 2021-06-17 RX ORDER — ACETAMINOPHEN 325 MG/1
650 TABLET ORAL EVERY 6 HOURS PRN
Status: DISCONTINUED | OUTPATIENT
Start: 2021-06-17 | End: 2021-06-19 | Stop reason: HOSPADM

## 2021-06-17 RX ORDER — AMILORIDE HYDROCHLORIDE 5 MG/1
5 TABLET ORAL DAILY
Status: DISCONTINUED | OUTPATIENT
Start: 2021-06-18 | End: 2021-06-19 | Stop reason: HOSPADM

## 2021-06-17 RX ORDER — MORPHINE SULFATE 4 MG/ML
4 INJECTION, SOLUTION INTRAMUSCULAR; INTRAVENOUS EVERY 6 HOURS PRN
Status: DISCONTINUED | OUTPATIENT
Start: 2021-06-17 | End: 2021-06-19 | Stop reason: HOSPADM

## 2021-06-17 RX ORDER — ESCITALOPRAM OXALATE 10 MG/1
20 TABLET ORAL
Status: DISCONTINUED | OUTPATIENT
Start: 2021-06-17 | End: 2021-06-19 | Stop reason: HOSPADM

## 2021-06-17 RX ORDER — CYCLOBENZAPRINE HCL 10 MG
10 TABLET ORAL 3 TIMES DAILY
Status: DISCONTINUED | OUTPATIENT
Start: 2021-06-17 | End: 2021-06-19 | Stop reason: HOSPADM

## 2021-06-17 RX ORDER — FENTANYL CITRATE 50 UG/ML
50 INJECTION, SOLUTION INTRAMUSCULAR; INTRAVENOUS 4 TIMES DAILY PRN
Status: DISCONTINUED | OUTPATIENT
Start: 2021-06-17 | End: 2021-06-17

## 2021-06-17 RX ORDER — HALOPERIDOL 5 MG/ML
5 INJECTION INTRAMUSCULAR ONCE
Status: COMPLETED | OUTPATIENT
Start: 2021-06-17 | End: 2021-06-17

## 2021-06-17 RX ORDER — FERROUS SULFATE 325(65) MG
325 TABLET ORAL EVERY OTHER DAY
Status: DISCONTINUED | OUTPATIENT
Start: 2021-06-17 | End: 2021-06-19 | Stop reason: HOSPADM

## 2021-06-17 RX ORDER — MORPHINE SULFATE 4 MG/ML
4 INJECTION, SOLUTION INTRAMUSCULAR; INTRAVENOUS ONCE
Status: COMPLETED | OUTPATIENT
Start: 2021-06-17 | End: 2021-06-17

## 2021-06-17 RX ORDER — AMITRIPTYLINE HYDROCHLORIDE 25 MG/1
50 TABLET, FILM COATED ORAL
Status: DISCONTINUED | OUTPATIENT
Start: 2021-06-17 | End: 2021-06-19 | Stop reason: HOSPADM

## 2021-06-17 RX ORDER — SODIUM CHLORIDE 9 MG/ML
75 INJECTION, SOLUTION INTRAVENOUS CONTINUOUS
Status: DISCONTINUED | OUTPATIENT
Start: 2021-06-17 | End: 2021-06-19

## 2021-06-17 RX ADMIN — HALOPERIDOL LACTATE 5 MG: 5 INJECTION, SOLUTION INTRAMUSCULAR at 14:11

## 2021-06-17 RX ADMIN — SODIUM CHLORIDE 1000 ML: 0.9 INJECTION, SOLUTION INTRAVENOUS at 14:11

## 2021-06-17 RX ADMIN — MORPHINE SULFATE 4 MG: 4 INJECTION INTRAVENOUS at 15:40

## 2021-06-17 RX ADMIN — ESCITALOPRAM OXALATE 20 MG: 10 TABLET ORAL at 21:16

## 2021-06-17 RX ADMIN — CYCLOBENZAPRINE HYDROCHLORIDE 10 MG: 10 TABLET, FILM COATED ORAL at 21:16

## 2021-06-17 RX ADMIN — FERROUS SULFATE TAB 325 MG (65 MG ELEMENTAL FE) 325 MG: 325 (65 FE) TAB at 16:49

## 2021-06-17 RX ADMIN — AMITRIPTYLINE HYDROCHLORIDE 50 MG: 25 TABLET, FILM COATED ORAL at 21:16

## 2021-06-17 RX ADMIN — SODIUM CHLORIDE 125 ML/HR: 0.9 INJECTION, SOLUTION INTRAVENOUS at 16:49

## 2021-06-17 RX ADMIN — MORPHINE SULFATE 4 MG: 4 INJECTION INTRAVENOUS at 20:10

## 2021-06-17 NOTE — H&P
[address] Angela Guerra  H&P- Regine Buenrostro 1985, 39 y o  female MRN: [id number]  Unit/Bed#: ED 08 Encounter: [id number]  Primary Care Provider: Yonatan Saha MD   Date and time admitted to hospital: 6/17/2021  1:47 PM    * Acute on chronic abdominal pain  Assessment & Plan  Patient presents with acute on chronic abdominal pain, nausea and vomiting since being discharged from [facility] 6/14  CT abdomen pelvis last admission showed with contrast showed normal pancreas, fluid throughout colon that may indicate diarrhea, postsurgical changes of Roshan-en-Y gastric bypass, no evidence of bowel obstruction  MRCP showed pancreas wnl, mild biliary ductal dilatation likely reflecting previous cholecystomy  EGD revealed normal RYGB anatomy w/ no evidence of ulcer  Suspected [place] syndrome with significant LLQ intestinal distention on exam     Suspect recurrent pancreatitis, lipase again elevated nearly 2000  Patient was not reimaged in the ER, consider CT abdomen pelvis if she spikes fevers or has worsening abdominal pain and inability to tolerate p o    IV fluids, antiemetics, NPO    Chronic anemia  Assessment & Plan  Continue iron supplementation    History of gastric bypass  Assessment & Plan  Continue vitamin supplementation    Hypokalemia  Assessment & Plan  Longstanding history of this  Daily IV potassium infusions at home   Continue amiloride     VTE Prophylaxis: Pharmacologic VTE Prophylaxis contraindicated due to encourage ambulation   / sequential compression device   Code Status: full code   POLST: POLST form is not discussed and not completed at this time  Discussion with family:     Anticipated Length of Stay:  Patient will be admitted on an Inpatient basis with an anticipated length of stay of  > 2 midnights  Justification for Hospital Stay: acute pancreatitis     Total Time for Visit, including Counseling / Coordination of Care: 45 minutes    Greater than 50% of this total time spent on direct patient counseling and coordination of care  Chief Complaint:   Abdominal pain     History of Present Illness:    Ady Dempsey is a 39 y o  female with past medical history significant for hypokalemia vascular bypass vitamin deficiency, anemia who presents with worsening abdominal pain in the left lower quadrant and vomiting  Patient states she was recently admitted to [facility] with the same symptoms and diagnosed with pancreatitis  States she had an EGD and MRCP that were otherwise normal/nondiagnostic  She reports inability to tolerate much orally  She has been trying to eat bland foods like grilled chicken and rice but continues to have vomiting episodes  No fevers or chills  Reports Loose stools  Passing flatus  Receives daily potassium infusions with visiting nurses that visit weekly for dressing changes of port  Review of Systems:    Review of Systems   Constitutional: Negative for chills and fever  HENT: Negative for congestion  Cardiovascular: Negative for chest pain  Gastrointestinal: Positive for abdominal pain, nausea and vomiting  Genitourinary: Negative for dysuria  Musculoskeletal: Negative for back pain  Skin: Negative for wound  Neurological: Negative for dizziness and light-headedness  Psychiatric/Behavioral: Negative for confusion       Past Medical and Surgical History:     Past Medical History:   Diagnosis Date    H  pylori infection     Hypokalemia     Left lower quadrant abdominal pain 2020    Migraine     Renal disorder     Rhabdomyolysis        Past Surgical History:   Procedure Laterality Date    ABDOMINAL SURGERY      APPENDECTOMY       SECTION      CHOLECYSTECTOMY      COSMETIC SURGERY      "tummy tuck"    FL GUIDED CENTRAL VENOUS ACCESS DEVICE INSERTION  2018    GASTRIC BYPASS      HYSTERECTOMY      LAPAROSCOPY      TUNNELED VENOUS PORT PLACEMENT N/A 2018    Procedure: INSERTION VENOUS PORT (PORT-A-CATH); Surgeon: Celio Caicedo DO;  Location: [facility] MAIN OR;  Service: General       Meds/Allergies:    Prior to Admission medications    Medication Sig Start Date End Date Taking? Authorizing Provider   AMILoride 5 mg tablet Take 1 tablet (5 mg total) by mouth daily 2/20/19   Hamida Mcconnell DO   amitriptyline (ELAVIL) 50 mg tablet Take 50 mg by mouth daily at bedtime 8/27/18   Historical Provider, MD   B Complex Vitamins (VITAMIN B COMPLEX PO) Take 1 capsule by mouth    Historical Provider, MD   Catheters MISC by Does not apply route 2 (two) times a week Port care per [place] protocol 5/7/20   Quan Greene DO   cyanocobalamin 1,000 mcg/mL Inject 1 mL (1,000 mcg total) into a muscle every 30 (thirty) days 5/4/20   Quan Greene DO   cyclobenzaprine (FLEXERIL) 10 mg tablet Take 10 mg by mouth 3 (three) times a day    Historical Provider, MD   dicyclomine (BENTYL) 10 mg capsule Take 10 mg by mouth 3/17/21 3/31/21  Historical Provider, MD   ergocalciferol (ERGOCALCIFEROL) 1 25 MG (41650 UT) capsule Take 1 capsule (50,000 Units total) by mouth 2 (two) times a week Patient takes this med on Mondays  Patient taking differently: Take 50,000 Units by mouth once a week Mondays and Thursdays 3/26/20   Sally Harvey MD   escitalopram (LEXAPRO) 10 mg tablet Take 20 mg by mouth daily at bedtime     Historical Provider, MD   ferrous sulfate 325 (65 Fe) mg tablet Take 325 mg by mouth every other day Last took 3/9/20    Historical Provider, MD   magnesium oxide (MAG-OX) 400 mg tablet Take 400 mg by mouth 7/24/20 7/24/21  Historical Provider, MD   potassium chloride 0 4 mEq/mL Infuse 200 mL (80 mEq total) into a venous catheter daily 80 meq in 1 litre bag to be infused daily over 8 hours 5/11/21   Quan Greene DO   Syringe/Needle, Disp, (SYRINGE 3CC/25GX5/8") 25G X 5/8" 3 ML MISC Use once monthly for B12 injection   6/1/20   Quan Greene DO     I have reviewed home medications with patient personally  Allergies: Allergies   Allergen Reactions    Nsaids Other (See Comments)     Other reaction(s): Other (Please comment)  Should not take s/p bariatric surgery  Other reaction(s): Other (See Comments)  Should not take as per prior records  Should not take s/p bariatric surgery  Has hx of gastric bypass      Prednisone      Nausea, vomiting, diarrhea       Social History:     Marital Status: /Civil Union   Occupation:   Patient Pre-hospital Living Situation: independent   Patient Pre-hospital Level of Mobility: none   Patient Pre-hospital Diet Restrictions: none   Substance Use History:   Social History     Substance and Sexual Activity   Alcohol Use Never    Alcohol/week: 0 0 standard drinks    Comment: 0     Social History     Tobacco Use   Smoking Status Never Smoker   Smokeless Tobacco Never Used     Social History     Substance and Sexual Activity   Drug Use Never       Family History:    Family History   Problem Relation Age of Onset    No Known Problems Mother     Hypertension Father     Diabetes Father     Diabetes Maternal Grandfather        Physical Exam:     Vitals:   Blood Pressure: 115/58 (06/17/21 1500)  Pulse: 70 (06/17/21 1500)  Temperature: (!) 97 4 °F (36 3 °C) (06/17/21 1351)  Temp Source: Temporal (06/17/21 1351)  Respirations: (!) 11 (06/17/21 1500)  Weight - Scale: 66 1 kg (145 lb 11 6 oz) (06/17/21 1351)  SpO2: 100 % (06/17/21 1500)    Physical Exam  Constitutional:       Appearance: Normal appearance  HENT:      Head: Normocephalic and atraumatic  Mouth/Throat:      Mouth: Mucous membranes are dry  Eyes:      Extraocular Movements: Extraocular movements intact  Cardiovascular:      Rate and Rhythm: Normal rate and regular rhythm  Comments: Right chest wall port  Pulmonary:      Effort: Pulmonary effort is normal       Breath sounds: Normal breath sounds  Abdominal:      General: Abdomen is flat   Bowel sounds are normal       Palpations: Abdomen is soft       Tenderness: There is abdominal tenderness (Tenderness to left lower quadrant)  Musculoskeletal:         General: No swelling  Normal range of motion  Cervical back: Normal range of motion and neck supple  Skin:     General: Skin is warm and dry  Neurological:      General: No focal deficit present  Mental Status: She is alert  Psychiatric:         Mood and Affect: Mood normal          Behavior: Behavior normal        Additional Data:     Lab Results: I have personally reviewed pertinent reports  Results from last 7 days   Lab Units 06/17/21  1409   WBC Thousand/uL 8 36   HEMOGLOBIN g/dL 10 2*   HEMATOCRIT % 33 3*   PLATELETS Thousands/uL 394*   NEUTROS PCT % 65   LYMPHS PCT % 25   MONOS PCT % 8   EOS PCT % 1     Results from last 7 days   Lab Units 06/17/21  1409   SODIUM mmol/L 136   POTASSIUM mmol/L 3 7   CHLORIDE mmol/L 105   CO2 mmol/L 23   BUN mg/dL 12   CREATININE mg/dL 0 97   ANION GAP mmol/L 8   CALCIUM mg/dL 8 3   ALBUMIN g/dL 3 6   TOTAL BILIRUBIN mg/dL 0 18*   ALK PHOS U/L 68   ALT U/L 46   AST U/L 28   GLUCOSE RANDOM mg/dL 96                 Results from last 7 days   Lab Units 06/17/21  1409   LACTIC ACID mmol/L 1 1       Imaging: I have personally reviewed pertinent reports  XR abdomen obstruction series   ED Interpretation by [employer], DO (06/17 6625)   No small bowel obstruction          EKG, Pathology, and Other Studies Reviewed on Admission:   · EKG:     Allscripts / Epic Records Reviewed: Yes     ** Please Note: This note has been constructed using a voice recognition system   **

## 2021-06-17 NOTE — PLAN OF CARE
Problem: PAIN - ADULT  Goal: Verbalizes/displays adequate comfort level or baseline comfort level  Description: Interventions:  - Encourage patient to monitor pain and request assistance  - Assess pain using appropriate pain scale  - Administer analgesics based on type and severity of pain and evaluate response  - Implement non-pharmacological measures as appropriate and evaluate response  - Consider cultural and social influences on pain and pain management  - Notify physician/advanced practitioner if interventions unsuccessful or patient reports new pain  Outcome: Progressing     Problem: INFECTION - ADULT  Goal: Absence or prevention of progression during hospitalization  Description: INTERVENTIONS:  - Assess and monitor for signs and symptoms of infection  - Monitor lab/diagnostic results  - Monitor all insertion sites, i e  indwelling lines, tubes, and drains  - Monitor endotracheal if appropriate and nasal secretions for changes in amount and color  - [place] appropriate cooling/warming therapies per order  - Administer medications as ordered  - Instruct and encourage patient and family to use good hand hygiene technique  - Identify and instruct in appropriate isolation precautions for identified infection/condition  Outcome: Progressing     Problem: DISCHARGE PLANNING  Goal: Discharge to home or other facility with appropriate resources  Description: INTERVENTIONS:  - Identify barriers to discharge w/patient and caregiver  - Arrange for needed discharge resources and transportation as appropriate  - Identify discharge learning needs (meds, wound care, etc )  - Arrange for interpretive services to assist at discharge as needed  - Refer to Case Management Department for coordinating discharge planning if the patient needs post-hospital services based on physician/advanced practitioner order or complex needs related to functional status, cognitive ability, or social support system  Outcome: Progressing Problem: Knowledge Deficit  Goal: Patient/family/caregiver demonstrates understanding of disease process, treatment plan, medications, and discharge instructions  Description: Complete learning assessment and assess knowledge base    Interventions:  - Provide teaching at level of understanding  - Provide teaching via preferred learning methods  Outcome: Progressing     Problem: GASTROINTESTINAL - ADULT  Goal: Minimal or absence of nausea and/or vomiting  Description: INTERVENTIONS:  - Administer IV fluids if ordered to ensure adequate hydration  - Maintain NPO status until nausea and vomiting are resolved  - Nasogastric tube if ordered  - Administer ordered antiemetic medications as needed  - Provide nonpharmacologic comfort measures as appropriate  - Advance diet as tolerated, if ordered  - Consider nutrition services referral to assist patient with adequate nutrition and appropriate food choices  Outcome: Progressing

## 2021-06-17 NOTE — ED PROVIDER NOTES
History  Chief Complaint   Patient presents with    Abdominal Pain     pt states lower abdominal pain and N/V starting yesterday, +fever  pt states she was hospitalized last week for the same, and dx with pancreatitis  21-year-old female complains of persistent nausea and vomiting mostly with food, symptoms fluids, sometimes tolerating fluids  Currently nauseous, ran out Zofran  Recently hospitalized for same symptoms, had elevated lipase  Notes symptoms began 3/21 and have been persistent, multiple courses of antibiotics, none currently  No fever chills  No chest pain or dyspnea  Has right chest port accessed for potassium infusions  Not amenable to acetaminophen 1000 mg      History provided by:  Patient  Abdominal Pain  Pain location:  Generalized  Pain quality: cramping    Pain radiates to:  Does not radiate  Pain severity:  Severe  Timing:  Constant  Progression:  Unchanged  Chronicity:  Chronic  Ineffective treatments:  Acetaminophen  Associated symptoms: diarrhea, nausea and vomiting    Associated symptoms: no chest pain, no fever and no shortness of breath        Prior to Admission Medications   Prescriptions Last Dose Informant Patient Reported? Taking? AMILoride 5 mg tablet  Self No No   Sig: Take 1 tablet (5 mg total) by mouth daily   B Complex Vitamins (VITAMIN B COMPLEX PO)   Yes No   Sig: Take 1 capsule by mouth   Catheters MISC   No No   Sig: by Does not apply route 2 (two) times a week Port care per [employer], Disp, (SYRINGE 3CC/25GX5/8") 25G X 5/8" 3 ML MISC   No No   Sig: Use once monthly for B12 injection     amitriptyline (ELAVIL) 50 mg tablet   Yes No   Sig: Take 50 mg by mouth daily at bedtime   cyanocobalamin 1,000 mcg/mL   No No   Sig: Inject 1 mL (1,000 mcg total) into a muscle every 30 (thirty) days   cyclobenzaprine (FLEXERIL) 10 mg tablet   Yes No   Sig: Take 10 mg by mouth 3 (three) times a day   dicyclomine (BENTYL) 10 mg capsule   Yes No   Sig: Take 10 mg by mouth   ergocalciferol (ERGOCALCIFEROL) 1 25 MG (33088 UT) capsule   No No   Sig: Take 1 capsule (50,000 Units total) by mouth 2 (two) times a week Patient takes this med on    Patient taking differently: Take 50,000 Units by mouth once a week  and    escitalopram (LEXAPRO) 10 mg tablet  Self Yes No   Sig: Take 20 mg by mouth daily at bedtime    ferrous sulfate 325 (65 Fe) mg tablet   Yes No   Sig: Take 325 mg by mouth every other day Last took 3/9/20   magnesium oxide (MAG-OX) 400 mg tablet   Yes No   Sig: Take 400 mg by mouth   potassium chloride 0 4 mEq/mL   No No   Sig: Infuse 200 mL (80 mEq total) into a venous catheter daily 80 meq in 1 litre bag to be infused daily over 8 hours      Facility-Administered Medications: None       Past Medical History:   Diagnosis Date    H  pylori infection     Hypokalemia     Left lower quadrant abdominal pain 2020    Migraine     Renal disorder     Rhabdomyolysis        Past Surgical History:   Procedure Laterality Date    ABDOMINAL SURGERY      APPENDECTOMY       SECTION      CHOLECYSTECTOMY      COSMETIC SURGERY      "tummy tuck"    FL GUIDED CENTRAL VENOUS ACCESS DEVICE INSERTION  2018    GASTRIC BYPASS      HYSTERECTOMY      LAPAROSCOPY      TUNNELED VENOUS PORT PLACEMENT N/A 2018    Procedure: INSERTION VENOUS PORT (PORT-A-CATH); Surgeon: Geovanni Gonzalez DO;  Location: [facility];  Service: General       Family History   Problem Relation Age of Onset    No Known Problems Mother     Hypertension Father     Diabetes Father     Diabetes Maternal Grandfather      I have reviewed and agree with the history as documented      E-Cigarette/Vaping    E-Cigarette Use Never User      E-Cigarette/Vaping Substances    Nicotine No     THC No     CBD No      Social History     Tobacco Use    Smoking status: Never Smoker    Smokeless tobacco: Never Used   Vaping Use    Vaping Use: Never used   Substance Use Topics    Alcohol use: Never     Alcohol/week: 0 0 standard drinks     Comment: 0    Drug use: Never       Review of Systems   Constitutional: Negative for fever  Respiratory: Negative for shortness of breath  Cardiovascular: Negative for chest pain  Gastrointestinal: Positive for abdominal pain, diarrhea, nausea and vomiting  All other systems reviewed and are negative  Physical Exam  Physical Exam  Vitals and nursing note reviewed  Constitutional:       Comments: Pleasant, comfortable-appearing   HENT:      Head: Normocephalic and atraumatic  Eyes:      Conjunctiva/sclera: Conjunctivae normal       Pupils: Pupils are equal, round, and reactive to light  Cardiovascular:      Rate and Rhythm: Normal rate and regular rhythm  Heart sounds: Normal heart sounds  Comments: Right chest port accessed, clear dressing, site appears knee in clean, erythema, nontender  Pulmonary:      Effort: Pulmonary effort is normal       Breath sounds: Normal breath sounds  Abdominal:      General: Bowel sounds are normal  There is no distension  Palpations: Abdomen is soft  Tenderness: There is abdominal tenderness  Comments: Generally tender, nondistended, no rebound or guarding   Musculoskeletal:         General: Normal range of motion  Cervical back: Neck supple  Skin:     General: Skin is warm and dry  Neurological:      Mental Status: She is alert and oriented to person, place, and time  Cranial Nerves: No cranial nerve deficit  Coordination: Coordination normal    Psychiatric:         Behavior: Behavior normal          Thought Content:  Thought content normal          Judgment: Judgment normal          Vital Signs  ED Triage Vitals [06/17/21 1351]   Temperature Pulse Respirations Blood Pressure SpO2   (!) 97 4 °F (36 3 °C) 89 20 129/65 99 %      Temp Source Heart Rate Source Patient Position - Orthostatic VS BP Location FiO2 (%)   Temporal Monitor Sitting Right arm --      Pain Score       9           Vitals:    06/17/21 1351 06/17/21 1500   BP: 129/65 115/58   Pulse: 89 70   Patient Position - Orthostatic VS: Sitting Sitting         Visual Acuity      ED Medications  Medications   morphine (PF) 4 mg/mL injection 4 mg (has no administration in time range)   sodium chloride 0 9 % bolus 1,000 mL (1,000 mL Intravenous New Bag 6/17/21 1411)   haloperidol lactate (HALDOL) injection 5 mg (5 mg Intravenous Given 6/17/21 1411)       Diagnostic Studies  Results Reviewed     Procedure Component Value Units Date/Time    Lipase [[id number]]  (Abnormal) Collected: 06/17/21 1409    Lab Status: Final result Specimen: Blood from Line, Venous Updated: 06/17/21 1452     Lipase 1,990 u/L     Lactic acid [[id number]]  (Normal) Collected: 06/17/21 1409    Lab Status: Final result Specimen: Blood from Line, Venous Updated: 06/17/21 1443     LACTIC ACID 1 1 mmol/L     Narrative:      Result may be elevated if tourniquet was used during collection      Comprehensive metabolic panel [[id number]]  (Abnormal) Collected: 06/17/21 1409    Lab Status: Final result Specimen: Blood from Line, Venous Updated: 06/17/21 1437     Sodium 136 mmol/L      Potassium 3 7 mmol/L      Chloride 105 mmol/L      CO2 23 mmol/L      ANION GAP 8 mmol/L      BUN 12 mg/dL      Creatinine 0 97 mg/dL      Glucose 96 mg/dL      Calcium 8 3 mg/dL      AST 28 U/L      ALT 46 U/L      Alkaline Phosphatase 68 U/L      Total Protein 6 9 g/dL      Albumin 3 6 g/dL      Total Bilirubin 0 18 mg/dL      eGFR 75 ml/min/1 73sq m     Narrative:      [place] guidelines for Chronic Kidney Disease (CKD):     Stage 1 with normal or high GFR (GFR > 90 mL/min/1 73 square meters)    Stage 2 Mild CKD (GFR = 60-89 mL/min/1 73 square meters)    Stage 3A Moderate CKD (GFR = 45-59 mL/min/1 73 square meters)    Stage 3B Moderate CKD (GFR = 30-44 mL/min/1 73 square meters)    Stage 4 Severe CKD (GFR = 15-29 mL/min/1 73 square meters)    Stage 5 End Stage CKD (GFR <15 mL/min/1 73 square meters)  Note: GFR calculation is accurate only with a steady state creatinine    CBC and differential [[id number]]  (Abnormal) Collected: 06/17/21 1409    Lab Status: Final result Specimen: Blood from Line, Venous Updated: 06/17/21 1421     WBC 8 36 Thousand/uL      RBC 4 05 Million/uL      Hemoglobin 10 2 g/dL      Hematocrit 33 3 %      MCV 82 fL      MCH 25 2 pg      MCHC 30 6 g/dL      RDW 17 0 %      MPV 11 3 fL      Platelets 438 Thousands/uL      nRBC 0 /100 WBCs      Neutrophils Relative 65 %      Immat GRANS % 0 %      Lymphocytes Relative 25 %      Monocytes Relative 8 %      Eosinophils Relative 1 %      Basophils Relative 1 %      Neutrophils Absolute 5 48 Thousands/µL      Immature Grans Absolute 0 02 Thousand/uL      Lymphocytes Absolute 2 06 Thousands/µL      Monocytes Absolute 0 63 Thousand/µL      Eosinophils Absolute 0 10 Thousand/µL      Basophils Absolute 0 07 Thousands/µL                  XR abdomen obstruction series   ED Interpretation by [employer], DO (06/17 1456)   No small bowel obstruction                 Procedures  Procedures         ED Course  ED Course as of Jun 17 1519   Thu Jun 17, 2021   1455 Lipase(!): 1,990   1504 Pain improved, abdomen stool generally tender, nausea resolved      1518 We discussed results including elevated lipase probably related to vomiting and agreeable with hospitalization for hydration, analgesia, antiemetics                                              MDM    Disposition  Final diagnoses:   Abdominal pain - Chronic, exacerbation, with nausea and vomiting   Elevated lipase     Time reflects when diagnosis was documented in both MDM as applicable and the Disposition within this note     Time User Action Codes Description Comment    6/17/2021  3:18 PM Fang Steen Add [R10 9] Abdominal pain     6/17/2021  3:18 PM Fang Steen Modify [R10 9] Abdominal pain Chronic, exacerbation, with nausea and vomiting    6/17/2021  3:18 PM Nicole Herman Add [R74 8] Elevated lipase       ED Disposition     ED Disposition Condition Date/Time Comment    Admit Stable Thu Jun 17, 2021  3:16 PM Case was discussed with Kendall and the patient's admission status was agreed to be Admission Status: inpatient status to the service of Dr Karyna Del Rio   Follow-up Information    None         Patient's Medications   Discharge Prescriptions    No medications on file     No discharge procedures on file      PDMP Review       Value Time User    PDMP Reviewed  Yes 4/21/2021  1:04 PM Kelsea Villa PA-C          ED Provider  Electronically Signed by           Dafne Mckeon DO  06/17/21 4488

## 2021-06-17 NOTE — ASSESSMENT & PLAN NOTE
Patient presents with acute on chronic abdominal pain, nausea and vomiting since being discharged from [facility] 6/14  CT abdomen pelvis last admission showed with contrast showed normal pancreas, fluid throughout colon that may indicate diarrhea, postsurgical changes of Roshan-en-Y gastric bypass, no evidence of bowel obstruction  MRCP showed pancreas wnl, mild biliary ductal dilatation likely reflecting previous cholecystomy  EGD revealed normal RYGB anatomy w/ no evidence of ulcer     Suspected [place] syndrome with significant LLQ intestinal distention on exam     Suspect recurrent pancreatitis, lipase again elevated nearly 2000  Patient was not reimaged in the ER, consider CT abdomen pelvis if she spikes fevers or has worsening abdominal pain and inability to tolerate p o    IV fluids, antiemetics, NPO

## 2021-06-18 LAB
ALBUMIN SERPL BCP-MCNC: 2.5 G/DL (ref 3.5–5)
ALP SERPL-CCNC: 67 U/L (ref 46–116)
ALT SERPL W P-5'-P-CCNC: 66 U/L (ref 12–78)
ANION GAP SERPL CALCULATED.3IONS-SCNC: 8 MMOL/L (ref 4–13)
AST SERPL W P-5'-P-CCNC: 102 U/L (ref 5–45)
BASOPHILS # BLD AUTO: 0.06 THOUSANDS/ΜL (ref 0–0.1)
BASOPHILS NFR BLD AUTO: 1 % (ref 0–1)
BILIRUB SERPL-MCNC: 0.16 MG/DL (ref 0.2–1)
BUN SERPL-MCNC: 9 MG/DL (ref 5–25)
CALCIUM ALBUM COR SERPL-MCNC: 8.7 MG/DL (ref 8.3–10.1)
CALCIUM SERPL-MCNC: 7.5 MG/DL (ref 8.3–10.1)
CHLORIDE SERPL-SCNC: 111 MMOL/L (ref 100–108)
CO2 SERPL-SCNC: 23 MMOL/L (ref 21–32)
CREAT SERPL-MCNC: 0.72 MG/DL (ref 0.6–1.3)
EOSINOPHIL # BLD AUTO: 0.08 THOUSAND/ΜL (ref 0–0.61)
EOSINOPHIL NFR BLD AUTO: 2 % (ref 0–6)
ERYTHROCYTE [DISTWIDTH] IN BLOOD BY AUTOMATED COUNT: 17 % (ref 11.6–15.1)
GFR SERPL CREATININE-BSD FRML MDRD: 107 ML/MIN/1.73SQ M
GLUCOSE SERPL-MCNC: 74 MG/DL (ref 65–140)
HCT VFR BLD AUTO: 28.4 % (ref 34.8–46.1)
HGB BLD-MCNC: 8.6 G/DL (ref 11.5–15.4)
IMM GRANULOCYTES # BLD AUTO: 0.01 THOUSAND/UL (ref 0–0.2)
IMM GRANULOCYTES NFR BLD AUTO: 0 % (ref 0–2)
LIPASE SERPL-CCNC: 123 U/L (ref 73–393)
LYMPHOCYTES # BLD AUTO: 1.65 THOUSANDS/ΜL (ref 0.6–4.47)
LYMPHOCYTES NFR BLD AUTO: 33 % (ref 14–44)
MCH RBC QN AUTO: 25 PG (ref 26.8–34.3)
MCHC RBC AUTO-ENTMCNC: 30.3 G/DL (ref 31.4–37.4)
MCV RBC AUTO: 83 FL (ref 82–98)
MONOCYTES # BLD AUTO: 0.36 THOUSAND/ΜL (ref 0.17–1.22)
MONOCYTES NFR BLD AUTO: 7 % (ref 4–12)
NEUTROPHILS # BLD AUTO: 2.81 THOUSANDS/ΜL (ref 1.85–7.62)
NEUTS SEG NFR BLD AUTO: 57 % (ref 43–75)
NRBC BLD AUTO-RTO: 0 /100 WBCS
PLATELET # BLD AUTO: 292 THOUSANDS/UL (ref 149–390)
PMV BLD AUTO: 11 FL (ref 8.9–12.7)
POTASSIUM SERPL-SCNC: 4 MMOL/L (ref 3.5–5.3)
PROT SERPL-MCNC: 5 G/DL (ref 6.4–8.2)
RBC # BLD AUTO: 3.44 MILLION/UL (ref 3.81–5.12)
SODIUM SERPL-SCNC: 142 MMOL/L (ref 136–145)
TRIGL SERPL-MCNC: 85 MG/DL
WBC # BLD AUTO: 4.97 THOUSAND/UL (ref 4.31–10.16)

## 2021-06-18 PROCEDURE — 99254 IP/OBS CNSLTJ NEW/EST MOD 60: CPT | Performed by: PHYSICIAN ASSISTANT

## 2021-06-18 PROCEDURE — 83690 ASSAY OF LIPASE: CPT | Performed by: FAMILY MEDICINE

## 2021-06-18 PROCEDURE — 80053 COMPREHEN METABOLIC PANEL: CPT | Performed by: PHYSICIAN ASSISTANT

## 2021-06-18 PROCEDURE — 99232 SBSQ HOSP IP/OBS MODERATE 35: CPT | Performed by: FAMILY MEDICINE

## 2021-06-18 PROCEDURE — 84478 ASSAY OF TRIGLYCERIDES: CPT | Performed by: FAMILY MEDICINE

## 2021-06-18 PROCEDURE — 85025 COMPLETE CBC W/AUTO DIFF WBC: CPT | Performed by: PHYSICIAN ASSISTANT

## 2021-06-18 RX ORDER — BISACODYL 10 MG
10 SUPPOSITORY, RECTAL RECTAL DAILY
Status: DISCONTINUED | OUTPATIENT
Start: 2021-06-18 | End: 2021-06-19 | Stop reason: HOSPADM

## 2021-06-18 RX ORDER — BISACODYL 10 MG
10 SUPPOSITORY, RECTAL RECTAL DAILY PRN
Status: DISCONTINUED | OUTPATIENT
Start: 2021-06-18 | End: 2021-06-18

## 2021-06-18 RX ORDER — DOCUSATE SODIUM 100 MG/1
100 CAPSULE, LIQUID FILLED ORAL 2 TIMES DAILY
Status: DISCONTINUED | OUTPATIENT
Start: 2021-06-18 | End: 2021-06-18

## 2021-06-18 RX ORDER — CHOLESTYRAMINE LIGHT 4 G/5.7G
4 POWDER, FOR SUSPENSION ORAL DAILY
Status: DISCONTINUED | OUTPATIENT
Start: 2021-06-18 | End: 2021-06-19 | Stop reason: HOSPADM

## 2021-06-18 RX ADMIN — BISACODYL 10 MG: 10 SUPPOSITORY RECTAL at 15:28

## 2021-06-18 RX ADMIN — CYCLOBENZAPRINE HYDROCHLORIDE 10 MG: 10 TABLET, FILM COATED ORAL at 08:45

## 2021-06-18 RX ADMIN — AMITRIPTYLINE HYDROCHLORIDE 50 MG: 25 TABLET, FILM COATED ORAL at 21:10

## 2021-06-18 RX ADMIN — SODIUM CHLORIDE 125 ML/HR: 0.9 INJECTION, SOLUTION INTRAVENOUS at 07:52

## 2021-06-18 RX ADMIN — CHOLESTYRAMINE 4 G: 4 POWDER, FOR SUSPENSION ORAL at 15:28

## 2021-06-18 RX ADMIN — SODIUM CHLORIDE 125 ML/HR: 0.9 INJECTION, SOLUTION INTRAVENOUS at 00:19

## 2021-06-18 RX ADMIN — POTASSIUM CHLORIDE: 2 INJECTION, SOLUTION, CONCENTRATE INTRAVENOUS at 09:49

## 2021-06-18 RX ADMIN — AMILORIDE HYDROCHLORIDE 5 MG: 5 TABLET ORAL at 08:46

## 2021-06-18 RX ADMIN — MORPHINE SULFATE 4 MG: 4 INJECTION INTRAVENOUS at 20:27

## 2021-06-18 RX ADMIN — CYCLOBENZAPRINE HYDROCHLORIDE 10 MG: 10 TABLET, FILM COATED ORAL at 15:28

## 2021-06-18 RX ADMIN — MORPHINE SULFATE 4 MG: 4 INJECTION INTRAVENOUS at 07:53

## 2021-06-18 RX ADMIN — Medication 400 MG: at 08:45

## 2021-06-18 RX ADMIN — FAMOTIDINE 20 MG: 10 INJECTION INTRAVENOUS at 08:47

## 2021-06-18 RX ADMIN — ESCITALOPRAM OXALATE 20 MG: 10 TABLET ORAL at 21:10

## 2021-06-18 RX ADMIN — CYCLOBENZAPRINE HYDROCHLORIDE 10 MG: 10 TABLET, FILM COATED ORAL at 21:10

## 2021-06-18 RX ADMIN — MORPHINE SULFATE 4 MG: 4 INJECTION INTRAVENOUS at 13:53

## 2021-06-18 NOTE — ASSESSMENT & PLAN NOTE
Unclear etiology  May be 2nd to acute pancreatitis and ileus  Patient presents with acute on chronic abdominal pain, nausea and vomiting since being discharged from [facility] 6/14  CT abdomen pelvis last admission showed with contrast showed normal pancreas, fluid throughout colon that may indicate diarrhea, postsurgical changes of Roshan-en-Y gastric bypass, no evidence of bowel obstruction  MRCP showed pancreas wnl, mild biliary ductal dilatation likely reflecting previous cholecystomy  EGD revealed normal RYGB anatomy w/ no evidence of ulcer  Suspected [place] syndrome with significant LLQ intestinal distention on exam     Suspect recurrent pancreatitis, lipase again elevated nearly 2000 admission which have now resolved  X-ray obstruction series shows possible ileus versus distal obstruction  IV fluids, antiemetics, avoid constipation  Place on stool softeners and Dulcolax suppository  Place on clear liquids for now and observe  Patient did have a bowel movement today    Encourage few more to prevent constipation  Consult placed to general surgery

## 2021-06-18 NOTE — CONSULTS
Consultation - General Surgery   Buster Petersen 39 y o  female MRN: [id number]  Unit/Bed#: -01 Encounter: [id number]    Assessment/Plan     Assessment:  - Acute on chronic abdominal pain worse in left lower quadrant  - Recurrent pancreatitis, Lipase on admission , now 123  - Afebrile  - WBC 4 97  - GERD  - Chronic anemia, 8 6 today, baseline upon chart review looks to be around 10 4  - Chronic hypokalemia, daily IV infusions at home via right chest port  - Hypoproteinemia 5 0  - Hypoalbuminemia 2 5  - History of multiple prior abdominal surgeries including gastric bypass, appendectomy, cholecystectomy, , and hysterectomy      Plan:  - No acute surgical intervention  - Recommend Questran daily for diarrhea  - Continue clear liquid diet  - Laxatives/stool softeners  - If patient experiences peristent episodes of nausea/vomiting she can be made NPO and NGT can be considered at that time  - IVF hydration  - Monitor I/Os  - Medicate PRN pain/nausea  - Follow qAM CBC and BMP  - OOB ambulating  - Management of comorbidities per primary team    History of Present Illness     HPI:  Buster Petersen is a 39 y o  female who presents with left lower quadrant abdominal pain associated with nausea and vomiting present since   The symptoms are intermittent in nature  She reports taking several courses of antibiotics without relief of symptoms  She reports being admitted to [address] [place] [address] for the same and was discharged on  with a diagnosis of pancreatitis  During that admission she underwent MRCP and EGD  In the ED at [address] [place] [address] yesterday her lipase was found to be , this improved to  123 this morning  She states that since hospital discharge form [facility] her symptoms have been more constant in nature  She admits to experiencing 4 bouts of liquid stool today  She does state that her stools are typically soft/liquid consistency  She denies fevers/chills  Denies chest pain or short of breath  No urinary issues  No current nausea or vomiting  Previous abdominal surgeries consist of gastric bypass, cholecystectomy, appendectomy, , hysterectomy  Inpatient consult to Acute Care Surgery  Consult performed by: Magaly Campbell PA-C  Consult ordered by: Lida Zarco MD          Review of Systems   Constitutional: Negative  HENT: Negative  Eyes: Negative  Respiratory: Negative  Cardiovascular: Negative  Gastrointestinal: Positive for abdominal pain, nausea and vomiting  Negative for blood in stool  Chronic loose stools   Endocrine: Negative  Genitourinary: Negative  Musculoskeletal: Negative  Skin: Negative  Allergic/Immunologic: Negative  Neurological: Negative  Hematological: Negative  Psychiatric/Behavioral: Negative  Historical Information   Past Medical History:   Diagnosis Date    H  pylori infection     Hypokalemia     Left lower quadrant abdominal pain 2020    Migraine     Renal disorder     Rhabdomyolysis      Past Surgical History:   Procedure Laterality Date    ABDOMINAL SURGERY      APPENDECTOMY       SECTION      CHOLECYSTECTOMY      COSMETIC SURGERY      "tummy tuck"    FL GUIDED CENTRAL VENOUS ACCESS DEVICE INSERTION  2018    GASTRIC BYPASS      HYSTERECTOMY      LAPAROSCOPY      TUNNELED VENOUS PORT PLACEMENT N/A 2018    Procedure: INSERTION VENOUS PORT (PORT-A-CATH);   Surgeon: John Trivedi DO;  Location: [facility];  Service: General     Social History   Social History     Substance and Sexual Activity   Alcohol Use Never    Alcohol/week: 0 0 standard drinks    Comment: 0     Social History     Substance and Sexual Activity   Drug Use Never     E-Cigarette/Vaping    E-Cigarette Use Never User      E-Cigarette/Vaping Substances    Nicotine No     THC No     CBD No      Social History     Tobacco Use   Smoking Status Never Smoker   Smokeless Tobacco Never Used     Family History: non-contributory    Meds/Allergies   all current active meds have been reviewed, current meds:   Current Facility-Administered Medications   Medication Dose Route Frequency    acetaminophen (TYLENOL) tablet 650 mg  650 mg Oral Q6H PRN    AMILoride tablet 5 mg  5 mg Oral Daily    amitriptyline (ELAVIL) tablet 50 mg  50 mg Oral HS    bisacodyl (DULCOLAX) rectal suppository 10 mg  10 mg Rectal Daily    cyclobenzaprine (FLEXERIL) tablet 10 mg  10 mg Oral TID    docusate sodium (COLACE) capsule 100 mg  100 mg Oral BID    escitalopram (LEXAPRO) tablet 20 mg  20 mg Oral HS    famotidine (PEPCID) injection 20 mg  20 mg Intravenous Q24H MIKE    ferrous sulfate tablet 325 mg  325 mg Oral Every Other Day    magnesium oxide (MAG-OX) tablet 400 mg  400 mg Oral Daily With Breakfast    morphine (PF) 4 mg/mL injection 4 mg  4 mg Intravenous Q6H PRN    potassium chloride 80 mEq in sodium chloride 0 9 % 1,000 mL infusion   Intravenous Daily    sodium chloride 0 9 % infusion  75 mL/hr Intravenous Continuous    and PTA meds:   Prior to Admission Medications   Prescriptions Last Dose Informant Patient Reported? Taking? AMILoride 5 mg tablet 6/16/2021 at Unknown time Self No Yes   Sig: Take 1 tablet (5 mg total) by mouth daily   B Complex Vitamins (VITAMIN B COMPLEX PO) 6/16/2021 at Unknown time  Yes Yes   Sig: Take 1 capsule by mouth   Catheters MISC   No No   Sig: by Does not apply route 2 (two) times a week Port care per [facility], Disp, (SYRINGE 3CC/25GX5/8") 25G X 5/8" 3 ML MISC   No No   Sig: Use once monthly for B12 injection     amitriptyline (ELAVIL) 50 mg tablet Not Taking at Unknown time  Yes No   Sig: Take 50 mg by mouth daily at bedtime   Patient not taking: Reported on 6/17/2021   cyanocobalamin 1,000 mcg/mL Past Month at Unknown time  No Yes   Sig: Inject 1 mL (1,000 mcg total) into a muscle every 30 (thirty) days   cyclobenzaprine (FLEXERIL) 10 mg tablet Not Taking at Unknown time  Yes No   Sig: Take 10 mg by mouth 3 (three) times a day   Patient not taking: Reported on 6/17/2021   dicyclomine (BENTYL) 10 mg capsule   Yes No   Sig: Take 10 mg by mouth   ergocalciferol (ERGOCALCIFEROL) 1 25 MG (33816 UT) capsule 6/16/2021 at Unknown time  No Yes   Sig: Take 1 capsule (50,000 Units total) by mouth 2 (two) times a week Patient takes this med on Mondays   Patient taking differently: Take 50,000 Units by mouth once a week Mondays and Thursdays   escitalopram (LEXAPRO) 10 mg tablet 6/16/2021 at Unknown time Self Yes Yes   Sig: Take 20 mg by mouth daily at bedtime    ferrous sulfate 325 (65 Fe) mg tablet 6/16/2021 at Unknown time  Yes Yes   Sig: Take 325 mg by mouth every other day Last took 3/9/20   magnesium oxide (MAG-OX) 400 mg tablet 6/16/2021 at Unknown time  Yes Yes   Sig: Take 400 mg by mouth   potassium chloride 0 4 mEq/mL 6/16/2021 at Unknown time  No Yes   Sig: Infuse 200 mL (80 mEq total) into a venous catheter daily 80 meq in 1 litre bag to be infused daily over 8 hours      Facility-Administered Medications: None     Allergies   Allergen Reactions    Nsaids Other (See Comments)     Other reaction(s): Other (Please comment)  Should not take s/p bariatric surgery  Other reaction(s):  Other (See Comments)  Should not take as per prior records  Should not take s/p bariatric surgery  Has hx of gastric bypass      Prednisone      Nausea, vomiting, diarrhea       Objective   First Vitals:   Blood Pressure: 129/65 (06/17/21 1351)  Pulse: 89 (06/17/21 1351)  Temperature: (!) 97 4 °F (36 3 °C) (06/17/21 1351)  Temp Source: Temporal (06/17/21 1351)  Respirations: 20 (06/17/21 1351)  Height: 5' 3" (160 cm) (06/17/21 1632)  Weight - Scale: 66 1 kg (145 lb 11 6 oz) (06/17/21 1351)  SpO2: 99 % (06/17/21 1351)    Current Vitals:   Blood Pressure: 106/76 (06/18/21 0736)  Pulse: 77 (06/18/21 0736)  Temperature: 98 5 °F (36 9 °C) (06/18/21 0736)  Temp Source: Temporal (06/17/21 1351)  Respirations: 16 (06/18/21 0736)  Height: 5' 3" (160 cm) (06/17/21 1632)  Weight - Scale: 63 5 kg (140 lb) (06/17/21 1632)  SpO2: 98 % (06/18/21 0736)      Intake/Output Summary (Last 24 hours) at 6/18/2021 1408  Last data filed at 6/18/2021 0948  Gross per 24 hour   Intake 2122 92 ml   Output --   Net 2122 92 ml       Invasive Devices     Central Venous Catheter Line            Port A Cath 12/22/18 Right Chest 909 days                Physical Exam  Vitals and nursing note reviewed  Constitutional:       General: She is not in acute distress  Appearance: Normal appearance  She is not ill-appearing  HENT:      Head: Normocephalic and atraumatic  Right Ear: External ear normal       Left Ear: External ear normal       Nose: Nose normal       Mouth/Throat:      Mouth: Mucous membranes are moist       Pharynx: Oropharynx is clear  Eyes:      Extraocular Movements: Extraocular movements intact  Conjunctiva/sclera: Conjunctivae normal       Pupils: Pupils are equal, round, and reactive to light  Cardiovascular:      Rate and Rhythm: Normal rate and regular rhythm  Pulses: Normal pulses  Heart sounds: Normal heart sounds  Pulmonary:      Effort: Pulmonary effort is normal  No respiratory distress  Breath sounds: Normal breath sounds  Abdominal:      General: Abdomen is flat  Comments: Bowel sounds high pitched, present in all quadrants  There is LLQ pain on palpation  No guarding or rebound  Abdomen is softly distended  Musculoskeletal:         General: Normal range of motion  Cervical back: Normal range of motion and neck supple  Right lower leg: No edema  Left lower leg: No edema  Skin:     General: Skin is warm and dry  Capillary Refill: Capillary refill takes less than 2 seconds  Coloration: Skin is not jaundiced  Neurological:      General: No focal deficit present  Mental Status: She is alert and oriented to person, place, and time     Psychiatric: Mood and Affect: Mood normal          Behavior: Behavior normal          Thought Content: Thought content normal          Judgment: Judgment normal          Lab Results:   I have personally reviewed pertinent lab results  CBC:   Lab Results   Component Value Date    WBC 4 97 06/18/2021    HGB 8 6 (L) 06/18/2021    HCT 28 4 (L) 06/18/2021    MCV 83 06/18/2021     06/18/2021    MCH 25 0 (L) 06/18/2021    MCHC 30 3 (L) 06/18/2021    RDW 17 0 (H) 06/18/2021    MPV 11 0 06/18/2021    NRBC 0 06/18/2021     CMP:   Lab Results   Component Value Date    SODIUM 142 06/18/2021    K 4 0 06/18/2021     (H) 06/18/2021    CO2 23 06/18/2021    BUN 9 06/18/2021    CREATININE 0 72 06/18/2021    CALCIUM 7 5 (L) 06/18/2021     (H) 06/18/2021    ALT 66 06/18/2021    ALKPHOS 67 06/18/2021    EGFR 107 06/18/2021     Lipase:   Lab Results   Component Value Date    LIPASE 123 06/18/2021     Imaging: I have personally reviewed pertinent reports  XR abd obstruction series 6/18/21  FINDINGS:   - No obvious small bowel dilatation is seen  Vinay Wing is persistent colonic distention similar to prior studies   There are multiple air-fluid levels  - No free air beneath the hemidiaphragms    - No pathologic calcifications or soft tissue masses evident   Surgical clips are seen right upper quadrant as well as surgical sutures in the left upper quadrant  Impression:     Colonic distention with air-fluid levels suggest possibility of ileus versus distal obstruction  EKG, Pathology, and Other Studies: I have personally reviewed pertinent reports  Counseling / Coordination of Care  Total floor / unit time spent today 40 minutes  Greater than 50% of total time was spent with the patient and / or family counseling and / or coordination of care  A description of the counseling / coordination of care: review of labs/imaging, obtaining history, performing exam, discussion of treatment plan      Latha South YANIQUE

## 2021-06-18 NOTE — UTILIZATION REVIEW
Inpatient Admission Authorization Request   NOTIFICATION OF INPATIENT ADMISSION/INPATIENT AUTHORIZATION REQUEST   SERVICING FACILITY:   [address] [place] [address]  Jose Padilla [address] [place] [address], [address]  Tax ID: [id number]  NPI: [id number]  Place of Service: Inpatient [facility]  [address]  Place of Service Code: 24     ATTENDING PROVIDER:  Attending Name and NPI#: Henrietta Lubin Md [[id number]]  Address: Jose Padilla [address] [place] [address] Devorah Salazar  Phone: [phone]     UTILIZATION REVIEW CONTACT:  Letta Dubin, Utilization   Network Utilization Review Department  Phone: [phone]  Fax [phone]  Email: [email or web address]     PHYSICIAN ADVISORY SERVICES:  FOR LHSN-QN-LTSE REVIEW - MEDICAL NECESSITY DENIAL  Phone: [phone]  Fax: [phone]  Email: [email or web address]     TYPE OF REQUEST:  Inpatient Status     ADMISSION INFORMATION:  ADMISSION DATE/TIME: 6/17/21  3:17 PM  PATIENT DIAGNOSIS CODE/DESCRIPTION:  Abdominal pain [R10 9]  Elevated lipase [R74 8]  DISCHARGE DATE/TIME: No discharge date for patient encounter  DISCHARGE DISPOSITION (IF DISCHARGED): Home/Self Care     IMPORTANT INFORMATION:  Please contact the Letta Dubin directly with any questions or concerns regarding this request  Department voicemails are confidential     Send requests for admission clinical reviews, concurrent reviews, approvals, and administrative denials due to lack of clinical to fax [phone]

## 2021-06-18 NOTE — ASSESSMENT & PLAN NOTE
Continue iron supplementation  Patient has acute on chronic anemia with hemoglobin dropped to 8 6 today from 10 2  Recheck CBC tomorrow    No evidence of any bleeding at this time

## 2021-06-18 NOTE — PLAN OF CARE
Problem: Potential for Falls  Goal: Patient will remain free of falls  Description: INTERVENTIONS:  - Educate patient/family on patient safety including physical limitations  - Instruct patient to call for assistance with activity   - Consult OT/PT to assist with strengthening/mobility   - Keep Call bell within reach  - Keep bed low and locked with side rails adjusted as appropriate  - Keep care items and personal belongings within reach  - Initiate and maintain comfort rounds  - Make Fall Risk Sign visible to staff  - Apply yellow socks and bracelet for high fall risk patients  - Consider moving patient to room near nurses station  Outcome: Progressing     Problem: PAIN - ADULT  Goal: Verbalizes/displays adequate comfort level or baseline comfort level  Description: Interventions:  - Encourage patient to monitor pain and request assistance  - Assess pain using appropriate pain scale  - Administer analgesics based on type and severity of pain and evaluate response  - Implement non-pharmacological measures as appropriate and evaluate response  - Consider cultural and social influences on pain and pain management  - Notify physician/advanced practitioner if interventions unsuccessful or patient reports new pain  Outcome: Progressing     Problem: INFECTION - ADULT  Goal: Absence or prevention of progression during hospitalization  Description: INTERVENTIONS:  - Assess and monitor for signs and symptoms of infection  - Monitor lab/diagnostic results  - Monitor all insertion sites, i e  indwelling lines, tubes, and drains  - Monitor endotracheal if appropriate and nasal secretions for changes in amount and color  - [place] appropriate cooling/warming therapies per order  - Administer medications as ordered  - Instruct and encourage patient and family to use good hand hygiene technique  - Identify and instruct in appropriate isolation precautions for identified infection/condition  Outcome: Progressing     Problem: SAFETY ADULT  Goal: Patient will remain free of falls  Description: INTERVENTIONS:  - Educate patient/family on patient safety including physical limitations  - Instruct patient to call for assistance with activity   - Consult OT/PT to assist with strengthening/mobility   - Keep Call bell within reach  - Keep bed low and locked with side rails adjusted as appropriate  - Keep care items and personal belongings within reach  - Initiate and maintain comfort rounds  - Make Fall Risk Sign visible to staff  - Apply yellow socks and bracelet for high fall risk patients  - Consider moving patient to room near nurses station  Outcome: Progressing  Goal: Maintain or return to baseline ADL function  Description: INTERVENTIONS:  -  Assess patient's ability to carry out ADLs; assess patient's baseline for ADL function and identify physical deficits which impact ability to perform ADLs (bathing, care of mouth/teeth, toileting, grooming, dressing, etc )  - Assess/evaluate cause of self-care deficits   - Assess range of motion  - Assess patient's mobility; develop plan if impaired  - Assess patient's need for assistive devices and provide as appropriate  - Encourage maximum independence but intervene and supervise when necessary  - Involve family in performance of ADLs  - Assess for home care needs following discharge   - Consider OT consult to assist with ADL evaluation and planning for discharge  - Provide patient education as appropriate  Outcome: Progressing  Goal: Maintains/Returns to pre admission functional level  Description: INTERVENTIONS:  - Perform BMAT or MOVE assessment daily    - Set and communicate daily mobility goal to care team and patient/family/caregiver  - Collaborate with rehabilitation services on mobility goals if consulted  - Perform Range of Motion 6 times a day  - Reposition patient every 2 hours    - Ambulate patient 5 times a day  - Out of bed for toileting  - Record patient progress and toleration of activity level   Outcome: Progressing     Problem: DISCHARGE PLANNING  Goal: Discharge to home or other facility with appropriate resources  Description: INTERVENTIONS:  - Identify barriers to discharge w/patient and caregiver  - Arrange for needed discharge resources and transportation as appropriate  - Identify discharge learning needs (meds, wound care, etc )  - Arrange for interpretive services to assist at discharge as needed  - Refer to Case Management Department for coordinating discharge planning if the patient needs post-hospital services based on physician/advanced practitioner order or complex needs related to functional status, cognitive ability, or social support system  Outcome: Progressing     Problem: Knowledge Deficit  Goal: Patient/family/caregiver demonstrates understanding of disease process, treatment plan, medications, and discharge instructions  Description: Complete learning assessment and assess knowledge base    Interventions:  - Provide teaching at level of understanding  - Provide teaching via preferred learning methods  Outcome: Progressing     Problem: GASTROINTESTINAL - ADULT  Goal: Minimal or absence of nausea and/or vomiting  Description: INTERVENTIONS:  - Administer IV fluids if ordered to ensure adequate hydration  - Maintain NPO status until nausea and vomiting are resolved  - Nasogastric tube if ordered  - Administer ordered antiemetic medications as needed  - Provide nonpharmacologic comfort measures as appropriate  - Advance diet as tolerated, if ordered  - Consider nutrition services referral to assist patient with adequate nutrition and appropriate food choices  Outcome: Progressing  Goal: Maintains or returns to baseline bowel function  Description: INTERVENTIONS:  - Assess bowel function  - Encourage oral fluids to ensure adequate hydration  - Administer IV fluids if ordered to ensure adequate hydration  - Administer ordered medications as needed  - Encourage mobilization and activity  - Consider nutritional services referral to assist patient with adequate nutrition and appropriate food choices  Outcome: Progressing  Goal: Maintains adequate nutritional intake  Description: INTERVENTIONS:  - Monitor percentage of each meal consumed  - Identify factors contributing to decreased intake, treat as appropriate  - Assist with meals as needed  - Monitor I&O, weight, and lab values if indicated  - Obtain nutrition services referral as needed  Outcome: Progressing  Goal: Establish and maintain optimal ostomy function  Description: INTERVENTIONS:  - Assess bowel function  - Encourage oral fluids to ensure adequate hydration  - Administer IV fluids if ordered to ensure adequate hydration   - Administer ordered medications as needed  - Encourage mobilization and activity  - Nutrition services referral to assist patient with appropriate food choices  - Assess stoma site  - Consider wound care consult   Outcome: Progressing  Goal: Oral mucous membranes remain intact  Description: INTERVENTIONS  - Assess oral mucosa and hygiene practices  - Implement preventative oral hygiene regimen  - Implement oral medicated treatments as ordered  - Initiate Nutrition services referral as needed  Outcome: Progressing

## 2021-06-18 NOTE — PROGRESS NOTES
[address]  Progress Note - Ben Amin 1985, 39 y o  female MRN: [id number]  Unit/Bed#: -01 Encounter: [id number]  Primary Care Provider: Alethea Ochoa MD   Date and time admitted to hospital: 6/17/2021  1:47 PM    * Acute on chronic abdominal pain  Assessment & Plan  Unclear etiology  May be 2nd to acute pancreatitis and ileus  Patient presents with acute on chronic abdominal pain, nausea and vomiting since being discharged from [facility] 6/14  CT abdomen pelvis last admission showed with contrast showed normal pancreas, fluid throughout colon that may indicate diarrhea, postsurgical changes of Roshan-en-Y gastric bypass, no evidence of bowel obstruction  MRCP showed pancreas wnl, mild biliary ductal dilatation likely reflecting previous cholecystomy  EGD revealed normal RYGB anatomy w/ no evidence of ulcer  Suspected [place] syndrome with significant LLQ intestinal distention on exam     Suspect recurrent pancreatitis, lipase again elevated nearly 2000 admission which have now resolved  X-ray obstruction series shows possible ileus versus distal obstruction  IV fluids, antiemetics, avoid constipation  Place on stool softeners and Dulcolax suppository  Place on clear liquids for now and observe  Patient did have a bowel movement today  Encourage few more to prevent constipation  Consult placed to general surgery    Hypokalemia  Assessment & Plan  Longstanding history of this  Daily IV potassium infusions at home   Continue amiloride     GERD (gastroesophageal reflux disease)  Assessment & Plan  Continue Pepcid    Chronic anemia  Assessment & Plan  Continue iron supplementation  Patient has acute on chronic anemia with hemoglobin dropped to 8 6 today from 10 2  Recheck CBC tomorrow    No evidence of any bleeding at this time    Migraine without aura and without status migrainosus, not intractable  Assessment & Plan  None at present    History of gastric bypass  Assessment & Plan  Continue vitamin supplementation    VTE Pharmacologic Prophylaxis:   Pharmacologic: Pharmacologic VTE Prophylaxis contraindicated due to Low risk  Mechanical VTE Prophylaxis in Place: Yes    Patient Centered Rounds: I have performed bedside rounds with nursing staff today  Discussions with Specialists or Other Care Team Provider:  Will discuss with surgery    Education and Discussions with Family / Patient:  Discussed with patient at bedside    Time Spent for Care: 30 minutes  More than 50% of total time spent on counseling and coordination of care as described above  Current Length of Stay: 1 day(s)    Current Patient Status: Inpatient   Certification Statement: The patient will continue to require additional inpatient hospital stay due to Abdominal pain    Discharge Plan:  Anticipate discharge home in the next 24-48 hours pending progress    Code Status: Level 1 - Full Code      Subjective:   Patient denies any chest pain or shortness of breath but does complain of abdominal discomfort and pain in the left side of the belly  No nausea vomiting or diarrhea reported  Did have bowel movement today    Objective:     Vitals:   Temp (24hrs), Av 1 °F (36 7 °C), Min:97 4 °F (36 3 °C), Max:98 5 °F (36 9 °C)    Temp:  [97 4 °F (36 3 °C)-98 5 °F (36 9 °C)] 98 5 °F (36 9 °C)  HR:  [63-89] 77  Resp:  [11-20] 16  BP: ()/(56-77) 106/76  SpO2:  [97 %-100 %] 98 %  Body mass index is 24 8 kg/m²  Input and Output Summary (last 24 hours): Intake/Output Summary (Last 24 hours) at 2021 1346  Last data filed at 2021 0948  Gross per 24 hour   Intake 2122 92 ml   Output --   Net 2122 92 ml       Physical Exam:     Physical Exam  Vitals and nursing note reviewed  Constitutional:       Appearance: Normal appearance  HENT:      Head: Normocephalic and atraumatic        Right Ear: External ear normal       Left Ear: External ear normal       Nose: Nose normal       Mouth/Throat:      Pharynx: Oropharynx is clear  Cardiovascular:      Rate and Rhythm: Normal rate and regular rhythm  Heart sounds: Normal heart sounds  Pulmonary:      Effort: Pulmonary effort is normal       Breath sounds: Normal breath sounds  Abdominal:      General: Bowel sounds are normal  There is distension  Palpations: Abdomen is soft  Tenderness: There is abdominal tenderness  Musculoskeletal:         General: Normal range of motion  Cervical back: Normal range of motion and neck supple  Skin:     General: Skin is warm and dry  Capillary Refill: Capillary refill takes less than 2 seconds  Neurological:      General: No focal deficit present  Mental Status: She is alert and oriented to person, place, and time  Psychiatric:         Mood and Affect: Mood normal            Additional Data:     Labs:    Results from last 7 days   Lab Units 06/18/21  0613   WBC Thousand/uL 4 97   HEMOGLOBIN g/dL 8 6*   HEMATOCRIT % 28 4*   PLATELETS Thousands/uL 292   NEUTROS PCT % 57   LYMPHS PCT % 33   MONOS PCT % 7   EOS PCT % 2     Results from last 7 days   Lab Units 06/18/21  0613   SODIUM mmol/L 142   POTASSIUM mmol/L 4 0   CHLORIDE mmol/L 111*   CO2 mmol/L 23   BUN mg/dL 9   CREATININE mg/dL 0 72   ANION GAP mmol/L 8   CALCIUM mg/dL 7 5*   ALBUMIN g/dL 2 5*   TOTAL BILIRUBIN mg/dL 0 16*   ALK PHOS U/L 67   ALT U/L 66   AST U/L 102*   GLUCOSE RANDOM mg/dL 74                 Results from last 7 days   Lab Units 06/17/21  1409   LACTIC ACID mmol/L 1 1           * I Have Reviewed All Lab Data Listed Above  * Additional Pertinent Lab Tests Reviewed:  Veto 66 Admission Reviewed    Imaging:    Imaging Reports Reviewed Today Include:  X-ray obstruction series  Imaging Personally Reviewed by Myself Includes:  X-ray obstruction series    Recent Cultures (last 7 days):           Last 24 Hours Medication List:   Current Facility-Administered Medications   Medication Dose Route Frequency Provider Last Rate    acetaminophen  650 mg Oral Q6H PRN Benji Phillip PA-C      AMILoride  5 mg Oral Daily Benji Phillip PA-C      amitriptyline  50 mg Oral HS Benji Phillip PA-C      bisacodyl  10 mg Rectal Daily Starla Goodwin MD      cyclobenzaprine  10 mg Oral TID Benji Phillip PA-C      docusate sodium  100 mg Oral BID Starla Goodwin MD      escitalopram  20 mg Oral HS Benji Phillip PA-C      famotidine  20 mg Intravenous Q24H [address] Benji Phillip PA-C      ferrous sulfate  325 mg Oral Every Other Day Benji Phillip PA-C      magnesium oxide  400 mg Oral Daily With Breakfast Benji Phillip PA-C      morphine injection  4 mg Intravenous Q6H PRN Starla Goodwin MD      IV infusion builder   Intravenous Daily Starla Goodwin  mL/hr at 06/18/21 0949    sodium chloride  125 mL/hr Intravenous Continuous Benji Phillip PA-C Stopped (06/18/21 9245)        Today, Patient Was Seen By: Starla Goodwin MD    ** Please Note: Dictation voice to text software may have been used in the creation of this document   **

## 2021-06-18 NOTE — UTILIZATION REVIEW
Initial Clinical Review    Admission: Date/Time/Statement:   Admission Orders (From admission, onward)     Ordered        06/17/21 1517  INPATIENT ADMISSION  Once                   Orders Placed This Encounter   Procedures    INPATIENT ADMISSION     Standing Status:   Standing     Number of Occurrences:   1     Order Specific Question:   Level of Care     Answer:   Med Surg [16]     Order Specific Question:   Estimated length of stay     Answer:   More than 2 Midnights     Order Specific Question:   Certification     Answer:   I certify that inpatient services are medically necessary for this patient for a duration of greater than two midnights  See H&P and MD Progress Notes for additional information about the patient's course of treatment  ED Arrival Information     Expected Arrival Acuity    - 6/17/2021 13:46 Urgent         Means of arrival Escorted by Service Admission type    Walk-In Self Hospitalist Urgent         Arrival complaint    Vomiting, diarrhea        Chief Complaint   Patient presents with    Abdominal Pain     pt states lower abdominal pain and N/V starting yesterday, +fever  pt states she was hospitalized last week for the same, and dx with pancreatitis  Initial Presentation: 39 y o  female with PMH of hypokalemia,  gastric bypass and anemia who presents to the ED from home with with worsening abdominal LLQ abdominal pain and vomiting    Patient states she was recently admitted to [facility] with  pancreatitis  Reports she had an EGD and MRCP that were otherwise normal/nondiagnostic  She reports inability to tolerate much orally  She has been trying to eat bland foods like grilled chicken and rice but continues to have vomiting episodes  Receives daily potassium infusions via port with VNA  ED labs revealed elevated lipase  Physical exam: abdominal tenderness, LLQ       Plan: Inpatient Med Surg admission for evaluation and treatment of acute on chronic abdominal pain, suspect recurrent pancreatitis:  IV fluids, NPO, antiemetics  6/18 Internal Medicine:  Patient c/o left abdominal discomfort, + bowel movement today  X-ray obstruction series shows possible ileus vs  Distal obstruction  Placed on clear liquids, consult General Surgery  Acute on chronic anemia with hemoglobin dropped to 8 6 today from 10 2  Recheck CBC tomorrow  No evidence of any bleeding at this time           ED Triage Vitals [06/17/21 1351]   Temperature Pulse Respirations Blood Pressure SpO2   (!) 97 4 °F (36 3 °C) 89 20 129/65 99 %      Temp Source Heart Rate Source Patient Position - Orthostatic VS BP Location FiO2 (%)   Temporal Monitor Sitting Right arm --      Pain Score       9          Wt Readings from Last 1 Encounters:   06/17/21 63 5 kg (140 lb)     Additional Vital Signs:    Date/Time  Temp  Pulse  Resp  BP  MAP (mmHg)  SpO2  O2 Device   06/18/21 0740  --  --  --  --  --  --  None (Room air)   06/18/21 07:36:22  98 5 °F (36 9 °C)  77  16  106/76  86  98 %  --   06/17/21 21:45:56  98 1 °F (36 7 °C)  63  12  107/63  78  97 %  --   06/17/21 2100  --  --  --  --  --  97 %  None (Room air)   06/17/21 1645  --  --  --  --  --  98 %  None (Room air)   06/17/21 16:32:10  98 5 °F (36 9 °C)  72  --  129/77  94  98 %  --   06/17/21 1600  --  65  11Abnormal   97/56  73  99 %  None (Room air)   06/17/21 1500  --  70  11Abnormal   115/58  81  100 %  None (Room air)             Pertinent Labs/Diagnostic Test Results:     6/17 abdomen x-ray:  Colonic distention with air-fluid levels suggest possibility of ileus versus distal obstruction            Results from last 7 days   Lab Units 06/18/21  0613 06/17/21  1409   WBC Thousand/uL 4 97 8 36   HEMOGLOBIN g/dL 8 6* 10 2*   HEMATOCRIT % 28 4* 33 3*   PLATELETS Thousands/uL 292 394*   NEUTROS ABS Thousands/µL 2 81 5 48         Results from last 7 days   Lab Units 06/18/21  0613 06/17/21  1409   SODIUM mmol/L 142 136   POTASSIUM mmol/L 4 0 3 7   CHLORIDE mmol/L 111* 105   CO2 mmol/L 23 23   ANION GAP mmol/L 8 8   BUN mg/dL 9 12   CREATININE mg/dL 0 72 0 97   EGFR ml/min/1 73sq m 107 75   CALCIUM mg/dL 7 5* 8 3     Results from last 7 days   Lab Units 06/18/21  0613 06/17/21  1409   AST U/L 102* 28   ALT U/L 66 46   ALK PHOS U/L 67 68   TOTAL PROTEIN g/dL 5 0* 6 9   ALBUMIN g/dL 2 5* 3 6   TOTAL BILIRUBIN mg/dL 0 16* 0 18*         Results from last 7 days   Lab Units 06/18/21  0613 06/17/21  1409   GLUCOSE RANDOM mg/dL 74 96         Results from last 7 days   Lab Units 06/17/21  1409   LACTIC ACID mmol/L 1 1           Results from last 7 days   Lab Units 06/18/21  0613 06/17/21  1409   LIPASE u/L 123 1,990*       ED Treatment:   Medication Administration from 06/17/2021 1343 to 06/17/2021 1631       Date/Time Order Dose Route Action     06/17/2021 1543 sodium chloride 0 9 % bolus 1,000 mL 0 mL Intravenous Stopped     06/17/2021 1411 sodium chloride 0 9 % bolus 1,000 mL 1,000 mL Intravenous New Bag     06/17/2021 1411 haloperidol lactate (HALDOL) injection 5 mg 5 mg Intravenous Given     06/17/2021 1540 morphine (PF) 4 mg/mL injection 4 mg 4 mg Intravenous Given        Past Medical History:   Diagnosis Date    H  pylori infection     Hypokalemia     Left lower quadrant abdominal pain 8/17/2020    Migraine     Renal disorder     Rhabdomyolysis      Present on Admission:   Hypokalemia   Migraine without aura and without status migrainosus, not intractable   Chronic anemia   Vitamin B12 deficiency   GERD (gastroesophageal reflux disease)   Nausea & vomiting      Admitting Diagnosis: Abdominal pain [R10 9]  Elevated lipase [R74 8]  Age/Sex: 39 y o  female       Admission Orders:      NPO, SCD      Scheduled Medications:      AMILoride, 5 mg, Oral, Daily  amitriptyline, 50 mg, Oral, HS  cyclobenzaprine, 10 mg, Oral, TID  escitalopram, 20 mg, Oral, HS  famotidine, 20 mg, Intravenous, Q24H MIKE  ferrous sulfate, 325 mg, Oral, Every Other Day  magnesium oxide, 400 mg, Oral, Daily With Breakfast      potassium chloride 80 mEq in sodium chloride 0 9 % 1,000 mL infusion   Freq: Daily Route: IV  Start: 06/18/21 0900    Continuous IV Infusions:      sodium chloride, 125 mL/hr, Intravenous, Continuous      PRN Meds:      acetaminophen, 650 mg, Oral, Q6H PRN  morphine injection, 4 mg, Intravenous, Q6H PRN x 1 dose 6/17 @ 2010, x21 doses 6/18 @ 9347, 4740 thus far  [facility] Utilization Review Department  ATTENTION: Please call with any questions or concerns to [phone] and carefully listen to the prompts so that you are directed to the right person  All voicemails are confidential   Chuckie Foster all requests for admission clinical reviews, approved or denied determinations and any other requests to dedicated fax number below belonging to the campus where the patient is receiving treatment   List of dedicated fax numbers for the Facilities:  [address] DENIALS (Administrative/Medical Necessity) [phone]   [address] (Maternity/NICU/Pediatrics) [phone] [address] [place] [address] [address] [place] [address] [address] [facility] [facility] [phone]

## 2021-06-19 VITALS
HEART RATE: 86 BPM | SYSTOLIC BLOOD PRESSURE: 114 MMHG | DIASTOLIC BLOOD PRESSURE: 57 MMHG | BODY MASS INDEX: 24.8 KG/M2 | WEIGHT: 140 LBS | TEMPERATURE: 98.4 F | HEIGHT: 63 IN | OXYGEN SATURATION: 100 % | RESPIRATION RATE: 17 BRPM

## 2021-06-19 LAB
ALBUMIN SERPL BCP-MCNC: 2.5 G/DL (ref 3.5–5)
ALP SERPL-CCNC: 59 U/L (ref 46–116)
ALT SERPL W P-5'-P-CCNC: 50 U/L (ref 12–78)
ANION GAP SERPL CALCULATED.3IONS-SCNC: 3 MMOL/L (ref 4–13)
AST SERPL W P-5'-P-CCNC: 36 U/L (ref 5–45)
BILIRUB SERPL-MCNC: 0.15 MG/DL (ref 0.2–1)
BUN SERPL-MCNC: 3 MG/DL (ref 5–25)
CALCIUM ALBUM COR SERPL-MCNC: 9 MG/DL (ref 8.3–10.1)
CALCIUM SERPL-MCNC: 7.8 MG/DL (ref 8.3–10.1)
CHLORIDE SERPL-SCNC: 110 MMOL/L (ref 100–108)
CO2 SERPL-SCNC: 26 MMOL/L (ref 21–32)
CREAT SERPL-MCNC: 0.8 MG/DL (ref 0.6–1.3)
ERYTHROCYTE [DISTWIDTH] IN BLOOD BY AUTOMATED COUNT: 17.2 % (ref 11.6–15.1)
GFR SERPL CREATININE-BSD FRML MDRD: 94 ML/MIN/1.73SQ M
GLUCOSE SERPL-MCNC: 80 MG/DL (ref 65–140)
HCT VFR BLD AUTO: 28.1 % (ref 34.8–46.1)
HGB BLD-MCNC: 8.6 G/DL (ref 11.5–15.4)
LIPASE SERPL-CCNC: 147 U/L (ref 73–393)
MCH RBC QN AUTO: 25.1 PG (ref 26.8–34.3)
MCHC RBC AUTO-ENTMCNC: 30.6 G/DL (ref 31.4–37.4)
MCV RBC AUTO: 82 FL (ref 82–98)
PLATELET # BLD AUTO: 295 THOUSANDS/UL (ref 149–390)
PMV BLD AUTO: 10.6 FL (ref 8.9–12.7)
POTASSIUM SERPL-SCNC: 4.3 MMOL/L (ref 3.5–5.3)
PROT SERPL-MCNC: 5 G/DL (ref 6.4–8.2)
RBC # BLD AUTO: 3.42 MILLION/UL (ref 3.81–5.12)
SODIUM SERPL-SCNC: 139 MMOL/L (ref 136–145)
WBC # BLD AUTO: 5.74 THOUSAND/UL (ref 4.31–10.16)

## 2021-06-19 PROCEDURE — 80053 COMPREHEN METABOLIC PANEL: CPT | Performed by: FAMILY MEDICINE

## 2021-06-19 PROCEDURE — 83690 ASSAY OF LIPASE: CPT | Performed by: FAMILY MEDICINE

## 2021-06-19 PROCEDURE — 85027 COMPLETE CBC AUTOMATED: CPT | Performed by: FAMILY MEDICINE

## 2021-06-19 PROCEDURE — 99239 HOSP IP/OBS DSCHRG MGMT >30: CPT | Performed by: FAMILY MEDICINE

## 2021-06-19 RX ORDER — DICYCLOMINE HYDROCHLORIDE 10 MG/1
10 CAPSULE ORAL
Qty: 42 CAPSULE | Refills: 0 | Status: SHIPPED | OUTPATIENT
Start: 2021-06-19 | End: 2021-11-06 | Stop reason: ALTCHOICE

## 2021-06-19 RX ORDER — ACETAMINOPHEN 325 MG/1
650 TABLET ORAL EVERY 6 HOURS PRN
Refills: 0
Start: 2021-06-19 | End: 2021-08-14 | Stop reason: ALTCHOICE

## 2021-06-19 RX ORDER — FAMOTIDINE 20 MG/1
20 TABLET, FILM COATED ORAL 2 TIMES DAILY
Qty: 30 TABLET | Refills: 0 | Status: SHIPPED | OUTPATIENT
Start: 2021-06-19 | End: 2021-08-14 | Stop reason: ALTCHOICE

## 2021-06-19 RX ORDER — CHOLESTYRAMINE LIGHT 4 G/5.7G
4 POWDER, FOR SUSPENSION ORAL DAILY
Qty: 15 PACKET | Refills: 0 | Status: SHIPPED | OUTPATIENT
Start: 2021-06-20 | End: 2021-08-14 | Stop reason: ALTCHOICE

## 2021-06-19 RX ADMIN — FAMOTIDINE 20 MG: 10 INJECTION INTRAVENOUS at 08:45

## 2021-06-19 RX ADMIN — SODIUM CHLORIDE 75 ML/HR: 0.9 INJECTION, SOLUTION INTRAVENOUS at 04:05

## 2021-06-19 RX ADMIN — MORPHINE SULFATE 4 MG: 4 INJECTION INTRAVENOUS at 14:02

## 2021-06-19 RX ADMIN — BISACODYL 10 MG: 10 SUPPOSITORY RECTAL at 08:45

## 2021-06-19 RX ADMIN — CYCLOBENZAPRINE HYDROCHLORIDE 10 MG: 10 TABLET, FILM COATED ORAL at 16:42

## 2021-06-19 RX ADMIN — Medication 400 MG: at 08:45

## 2021-06-19 RX ADMIN — CYCLOBENZAPRINE HYDROCHLORIDE 10 MG: 10 TABLET, FILM COATED ORAL at 08:45

## 2021-06-19 RX ADMIN — MORPHINE SULFATE 4 MG: 4 INJECTION INTRAVENOUS at 07:49

## 2021-06-19 RX ADMIN — FERROUS SULFATE TAB 325 MG (65 MG ELEMENTAL FE) 325 MG: 325 (65 FE) TAB at 08:45

## 2021-06-19 RX ADMIN — CHOLESTYRAMINE 4 G: 4 POWDER, FOR SUSPENSION ORAL at 08:45

## 2021-06-19 RX ADMIN — AMILORIDE HYDROCHLORIDE 5 MG: 5 TABLET ORAL at 08:53

## 2021-06-19 RX ADMIN — POTASSIUM CHLORIDE: 2 INJECTION, SOLUTION, CONCENTRATE INTRAVENOUS at 08:44

## 2021-06-19 NOTE — ASSESSMENT & PLAN NOTE
Continue iron supplementation  Patient has acute on chronic anemia with hemoglobin dropped to 8 6 today from 10 2      No evidence of any bleeding at this time

## 2021-06-19 NOTE — NURSING NOTE
Reviewed discharge instructions with patient  Patient verbalized understanding  Patient requesting pain medication  Dr Finley Loop aware  Patient will go home with port accessed due to home infusions  Prescriptions sent to pharmacy

## 2021-06-19 NOTE — ASSESSMENT & PLAN NOTE
Unclear etiology  May be 2nd to acute pancreatitis and ileus  Patient presents with acute on chronic abdominal pain, nausea and vomiting since being discharged from [facility] 6/14  CT abdomen pelvis last admission showed with contrast showed normal pancreas, fluid throughout colon that may indicate diarrhea, postsurgical changes of Roshan-en-Y gastric bypass, no evidence of bowel obstruction  MRCP showed pancreas wnl, mild biliary ductal dilatation likely reflecting previous cholecystomy  EGD revealed normal RYGB anatomy w/ no evidence of ulcer  Suspected [place] syndrome with significant LLQ intestinal distention on exam     Suspect recurrent pancreatitis, lipase again elevated nearly 2000 admission which have now resolved  X-ray obstruction series shows possible ileus versus distal obstruction  IV fluids, antiemetics, avoid constipation  Place on stool softeners and Dulcolax suppository  Advance diet and observe    If Abdominal pain continues to improve will discharge home later today

## 2021-06-19 NOTE — CONSULTS
This consult was completed and note placed in Epic yesterday  This is a duplicate order        Celena Uriarte PA-C

## 2021-06-19 NOTE — ASSESSMENT & PLAN NOTE
Longstanding history of this  Daily IV potassium infusions at home   Continue amiloride   Potassium level is normal

## 2021-06-19 NOTE — PLAN OF CARE
Problem: Potential for Falls  Goal: Patient will remain free of falls  Description: INTERVENTIONS:  - Educate patient/family on patient safety including physical limitations  - Instruct patient to call for assistance with activity   - Consult OT/PT to assist with strengthening/mobility   - Keep Call bell within reach  - Keep bed low and locked with side rails adjusted as appropriate  - Keep care items and personal belongings within reach  - Initiate and maintain comfort rounds  - Make Fall Risk Sign visible to staff  - Apply yellow socks and bracelet for high fall risk patients  - Consider moving patient to room near nurses station  Outcome: Progressing     Problem: PAIN - ADULT  Goal: Verbalizes/displays adequate comfort level or baseline comfort level  Description: Interventions:  - Encourage patient to monitor pain and request assistance  - Assess pain using appropriate pain scale  - Administer analgesics based on type and severity of pain and evaluate response  - Implement non-pharmacological measures as appropriate and evaluate response  - Consider cultural and social influences on pain and pain management  - Notify physician/advanced practitioner if interventions unsuccessful or patient reports new pain  Outcome: Progressing     Problem: INFECTION - ADULT  Goal: Absence or prevention of progression during hospitalization  Description: INTERVENTIONS:  - Assess and monitor for signs and symptoms of infection  - Monitor lab/diagnostic results  - Monitor all insertion sites, i e  indwelling lines, tubes, and drains  - Monitor endotracheal if appropriate and nasal secretions for changes in amount and color  - [place] appropriate cooling/warming therapies per order  - Administer medications as ordered  - Instruct and encourage patient and family to use good hand hygiene technique  - Identify and instruct in appropriate isolation precautions for identified infection/condition  Outcome: Progressing     Problem: SAFETY ADULT  Goal: Patient will remain free of falls  Description: INTERVENTIONS:  - Educate patient/family on patient safety including physical limitations  - Instruct patient to call for assistance with activity   - Consult OT/PT to assist with strengthening/mobility   - Keep Call bell within reach  - Keep bed low and locked with side rails adjusted as appropriate  - Keep care items and personal belongings within reach  - Initiate and maintain comfort rounds  - Apply yellow socks and bracelet for high fall risk patients  - Consider moving patient to room near nurses station  Outcome: Progressing  Goal: Maintain or return to baseline ADL function  Description: INTERVENTIONS:  -  Assess patient's ability to carry out ADLs; assess patient's baseline for ADL function and identify physical deficits which impact ability to perform ADLs (bathing, care of mouth/teeth, toileting, grooming, dressing, etc )  - Assess/evaluate cause of self-care deficits   - Assess range of motion  - Assess patient's mobility; develop plan if impaired  - Assess patient's need for assistive devices and provide as appropriate  - Encourage maximum independence but intervene and supervise when necessary  - Involve family in performance of ADLs  - Assess for home care needs following discharge   - Consider OT consult to assist with ADL evaluation and planning for discharge  - Provide patient education as appropriate  Outcome: Progressing  Goal: Maintains/Returns to pre admission functional level  Description: INTERVENTIONS:  - Perform BMAT or MOVE assessment daily    - Set and communicate daily mobility goal to care team and patient/family/caregiver     - Collaborate with rehabilitation services on mobility goals if consulted    - Dangle patient ad jony  - Stand patient ad jony  - Ambulate patient ad jony  - Out of bed to chair ad jony  - Out of bed ad jony  - Out of bed for toileting  - Record patient progress and toleration of activity level Outcome: Progressing     Problem: DISCHARGE PLANNING  Goal: Discharge to home or other facility with appropriate resources  Description: INTERVENTIONS:  - Identify barriers to discharge w/patient and caregiver  - Arrange for needed discharge resources and transportation as appropriate  - Identify discharge learning needs (meds, wound care, etc )  - Arrange for interpretive services to assist at discharge as needed  - Refer to Case Management Department for coordinating discharge planning if the patient needs post-hospital services based on physician/advanced practitioner order or complex needs related to functional status, cognitive ability, or social support system  Outcome: Progressing     Problem: Knowledge Deficit  Goal: Patient/family/caregiver demonstrates understanding of disease process, treatment plan, medications, and discharge instructions  Description: Complete learning assessment and assess knowledge base    Interventions:  - Provide teaching at level of understanding  - Provide teaching via preferred learning methods  Outcome: Progressing     Problem: GASTROINTESTINAL - ADULT  Goal: Minimal or absence of nausea and/or vomiting  Description: INTERVENTIONS:  - Administer IV fluids if ordered to ensure adequate hydration  - Maintain NPO status until nausea and vomiting are resolved  - Nasogastric tube if ordered  - Administer ordered antiemetic medications as needed  - Provide nonpharmacologic comfort measures as appropriate  - Advance diet as tolerated, if ordered  - Consider nutrition services referral to assist patient with adequate nutrition and appropriate food choices  Outcome: Progressing  Goal: Maintains or returns to baseline bowel function  Description: INTERVENTIONS:  - Assess bowel function  - Encourage oral fluids to ensure adequate hydration  - Administer IV fluids if ordered to ensure adequate hydration  - Administer ordered medications as needed  - Encourage mobilization and activity  - Consider nutritional services referral to assist patient with adequate nutrition and appropriate food choices  Outcome: Progressing  Goal: Maintains adequate nutritional intake  Description: INTERVENTIONS:  - Monitor percentage of each meal consumed  - Identify factors contributing to decreased intake, treat as appropriate  - Assist with meals as needed  - Monitor I&O, weight, and lab values if indicated  - Obtain nutrition services referral as needed  Outcome: Progressing  Goal: Establish and maintain optimal ostomy function  Description: INTERVENTIONS:  - Assess bowel function  - Encourage oral fluids to ensure adequate hydration  - Administer IV fluids if ordered to ensure adequate hydration   - Administer ordered medications as needed  - Encourage mobilization and activity  - Nutrition services referral to assist patient with appropriate food choices  - Assess stoma site  - Consider wound care consult   Outcome: Progressing  Goal: Oral mucous membranes remain intact  Description: INTERVENTIONS  - Assess oral mucosa and hygiene practices  - Implement preventative oral hygiene regimen  - Implement oral medicated treatments as ordered  - Initiate Nutrition services referral as needed  Outcome: Progressing

## 2021-06-19 NOTE — PROGRESS NOTES
Progress Note - General Surgery   Sylvain Brown 39 y o  female MRN: [id number]  Unit/Bed#: -01 Encounter: [id number]    Assessment: This is a 11-year-old female who is known to me for chronic abdominal pain  She has a history of bariatric surgery  She has been evaluated in the past by her bariatric surgeon for this pain  A diagnostic laparoscopy was completed without any significant findings in September 2020  The patient was referred to Colorectal surgery within the past month for further evaluation of colonic distention  Colonoscopy was completed 6/4/21 without any significant findings  Biopsies were taken however I am unable to locate these results  Since her colonoscopy the patient has now been admitted twice with pancreatitis of unknown etiology  Plan:  The patient may have a diet as tolerated  No surgical intervention is needed  Continue to follow with colorectal surgery  The patient in the past has been evaluated by Gastroenterology for the abdominal pain and no further recommendations were given  In the setting of new recurrent pancreatitis, repeat GI evaluation may be necessary as an outpatient    Subjective/Objective     Subjective: The patient states her pain is slightly improved  She does not have any nausea at this time  She is still having loose bowel movements    Objective:     Blood pressure 103/57, pulse 74, temperature 98 9 °F (37 2 °C), resp  rate 17, height 5' 3" (1 6 m), weight 63 5 kg (140 lb), SpO2 98 %  ,Body mass index is 24 8 kg/m²        Intake/Output Summary (Last 24 hours) at 6/19/2021 0851  Last data filed at 6/19/2021 0847  Gross per 24 hour   Intake 2538 34 ml   Output --   Net 2538 34 ml       Invasive Devices     Central Venous Catheter Line            Port A Cath 12/22/18 Right Chest 909 days                Physical Exam: General appearance: alert and oriented, in no acute distress  Head: Normocephalic, without obvious abnormality, atraumatic  Abdomen: Soft, positive for left-sided abdominal tenderness without rebound or guarding, nondistended, well-healed surgical incisions  Skin: Skin color, texture, turgor normal  No rashes or lesions  Neurologic: Alert and oriented X 3, normal strength and tone  Normal symmetric reflexes   Normal coordination and gait    Lab, Imaging and other studies:  CBC:   Lab Results   Component Value Date    WBC 5 74 06/19/2021    HGB 8 6 (L) 06/19/2021    HCT 28 1 (L) 06/19/2021    MCV 82 06/19/2021     06/19/2021    MCH 25 1 (L) 06/19/2021    MCHC 30 6 (L) 06/19/2021    RDW 17 2 (H) 06/19/2021    MPV 10 6 06/19/2021   , CMP:   Lab Results   Component Value Date    SODIUM 139 06/19/2021    K 4 3 06/19/2021     (H) 06/19/2021    CO2 26 06/19/2021    BUN 3 (L) 06/19/2021    CREATININE 0 80 06/19/2021    CALCIUM 7 8 (L) 06/19/2021    AST 36 06/19/2021    ALT 50 06/19/2021    ALKPHOS 59 06/19/2021    EGFR 94 06/19/2021

## 2021-06-21 NOTE — UTILIZATION REVIEW
Notification of Discharge   This is a Notification of Discharge from our facility [address]  Please be advised that this patient has been discharge from our facility  Below you will find the admission and discharge date and time including the patients disposition  UTILIZATION REVIEW CONTACT:  Vaughn Ching  Utilization   Network Utilization Review Department  Phone: [phone] x carefully listen to the prompts  All voicemails are confidential   Email: [email or web address]     PHYSICIAN ADVISORY SERVICES:  FOR [id number] REVIEW - MEDICAL NECESSITY DENIAL  Phone: [phone]  Fax: [phone]  Email: [email or web address]     PRESENTATION DATE: 6/17/2021  1:47 PM  OBERVATION ADMISSION DATE:  INPATIENT ADMISSION DATE: 6/17/21  3:17 PM   DISCHARGE DATE: 6/19/2021  6:34 PM  DISPOSITION: Home/Self Care Home/Self Care      IMPORTANT INFORMATION:  Send all requests for admission clinical reviews, approved or denied determinations and any other requests to dedicated fax number below belonging to the campus where the patient is receiving treatment   List of dedicated fax numbers:  [address] DENIALS (Administrative/Medical Necessity) [phone]   [address] (Maternity/NICU/Pediatrics) [phone]   Alejandra Duff [phone]   Joss Carrera [phone]   Eliot Salas [phone]   [address] [phone]   [facility]  [phone]   [address] [facility]  [address] [facility] [address] [facility] [phone]

## 2021-06-27 ENCOUNTER — HOSPITAL ENCOUNTER (EMERGENCY)
Facility: HOSPITAL | Age: 36
Discharge: HOME | End: 2021-06-27
Attending: EMERGENCY MEDICINE | Admitting: EMERGENCY MEDICINE
Payer: COMMERCIAL

## 2021-06-27 ENCOUNTER — APPOINTMENT (EMERGENCY)
Dept: RADIOLOGY | Facility: HOSPITAL | Age: 36
End: 2021-06-27
Payer: COMMERCIAL

## 2021-06-27 VITALS
RESPIRATION RATE: 18 BRPM | DIASTOLIC BLOOD PRESSURE: 63 MMHG | SYSTOLIC BLOOD PRESSURE: 119 MMHG | WEIGHT: 144.18 LBS | HEIGHT: 63 IN | OXYGEN SATURATION: 100 % | HEART RATE: 76 BPM | BODY MASS INDEX: 25.55 KG/M2 | TEMPERATURE: 97.2 F

## 2021-06-27 DIAGNOSIS — R10.9 CHRONIC ABDOMINAL PAIN: Primary | ICD-10-CM

## 2021-06-27 DIAGNOSIS — G89.29 CHRONIC ABDOMINAL PAIN: Primary | ICD-10-CM

## 2021-06-27 LAB
ALBUMIN SERPL BCP-MCNC: 2.9 G/DL (ref 3.5–5)
ALP SERPL-CCNC: 47 U/L (ref 46–116)
ALT SERPL W P-5'-P-CCNC: 36 U/L (ref 12–78)
ANION GAP SERPL CALCULATED.3IONS-SCNC: 7 MMOL/L (ref 4–13)
AST SERPL W P-5'-P-CCNC: 25 U/L (ref 5–45)
BASOPHILS # BLD AUTO: 0.08 THOUSANDS/ΜL (ref 0–0.1)
BASOPHILS NFR BLD AUTO: 1 % (ref 0–1)
BILIRUB SERPL-MCNC: 0.12 MG/DL (ref 0.2–1)
BUN SERPL-MCNC: 8 MG/DL (ref 5–25)
CALCIUM ALBUM COR SERPL-MCNC: 8.2 MG/DL (ref 8.3–10.1)
CALCIUM SERPL-MCNC: 7.3 MG/DL (ref 8.3–10.1)
CHLORIDE SERPL-SCNC: 108 MMOL/L (ref 100–108)
CO2 SERPL-SCNC: 22 MMOL/L (ref 21–32)
CREAT SERPL-MCNC: 0.77 MG/DL (ref 0.6–1.3)
EOSINOPHIL # BLD AUTO: 0.15 THOUSAND/ΜL (ref 0–0.61)
EOSINOPHIL NFR BLD AUTO: 2 % (ref 0–6)
ERYTHROCYTE [DISTWIDTH] IN BLOOD BY AUTOMATED COUNT: 17.1 % (ref 11.6–15.1)
GFR SERPL CREATININE-BSD FRML MDRD: 99 ML/MIN/1.73SQ M
GLUCOSE SERPL-MCNC: 82 MG/DL (ref 65–140)
HCT VFR BLD AUTO: 30 % (ref 34.8–46.1)
HGB BLD-MCNC: 9.1 G/DL (ref 11.5–15.4)
IMM GRANULOCYTES # BLD AUTO: 0.02 THOUSAND/UL (ref 0–0.2)
IMM GRANULOCYTES NFR BLD AUTO: 0 % (ref 0–2)
LACTATE SERPL-SCNC: 1.1 MMOL/L (ref 0.5–2)
LIPASE SERPL-CCNC: 189 U/L (ref 73–393)
LYMPHOCYTES # BLD AUTO: 2.8 THOUSANDS/ΜL (ref 0.6–4.47)
LYMPHOCYTES NFR BLD AUTO: 33 % (ref 14–44)
MCH RBC QN AUTO: 24.7 PG (ref 26.8–34.3)
MCHC RBC AUTO-ENTMCNC: 30.3 G/DL (ref 31.4–37.4)
MCV RBC AUTO: 81 FL (ref 82–98)
MONOCYTES # BLD AUTO: 0.61 THOUSAND/ΜL (ref 0.17–1.22)
MONOCYTES NFR BLD AUTO: 7 % (ref 4–12)
NEUTROPHILS # BLD AUTO: 4.77 THOUSANDS/ΜL (ref 1.85–7.62)
NEUTS SEG NFR BLD AUTO: 57 % (ref 43–75)
NRBC BLD AUTO-RTO: 0 /100 WBCS
PLATELET # BLD AUTO: 372 THOUSANDS/UL (ref 149–390)
PMV BLD AUTO: 10.7 FL (ref 8.9–12.7)
POTASSIUM SERPL-SCNC: 3.6 MMOL/L (ref 3.5–5.3)
PROT SERPL-MCNC: 5.7 G/DL (ref 6.4–8.2)
RBC # BLD AUTO: 3.69 MILLION/UL (ref 3.81–5.12)
SODIUM SERPL-SCNC: 137 MMOL/L (ref 136–145)
WBC # BLD AUTO: 8.43 THOUSAND/UL (ref 4.31–10.16)

## 2021-06-27 PROCEDURE — 83605 ASSAY OF LACTIC ACID: CPT | Performed by: EMERGENCY MEDICINE

## 2021-06-27 PROCEDURE — 87040 BLOOD CULTURE FOR BACTERIA: CPT | Performed by: EMERGENCY MEDICINE

## 2021-06-27 PROCEDURE — 83690 ASSAY OF LIPASE: CPT | Performed by: EMERGENCY MEDICINE

## 2021-06-27 PROCEDURE — 80053 COMPREHEN METABOLIC PANEL: CPT | Performed by: EMERGENCY MEDICINE

## 2021-06-27 PROCEDURE — 99284 EMERGENCY DEPT VISIT MOD MDM: CPT | Performed by: EMERGENCY MEDICINE

## 2021-06-27 PROCEDURE — 99284 EMERGENCY DEPT VISIT MOD MDM: CPT

## 2021-06-27 PROCEDURE — 36415 COLL VENOUS BLD VENIPUNCTURE: CPT | Performed by: EMERGENCY MEDICINE

## 2021-06-27 PROCEDURE — 96360 HYDRATION IV INFUSION INIT: CPT

## 2021-06-27 PROCEDURE — 71045 X-RAY EXAM CHEST 1 VIEW: CPT

## 2021-06-27 PROCEDURE — 85025 COMPLETE CBC W/AUTO DIFF WBC: CPT | Performed by: EMERGENCY MEDICINE

## 2021-06-27 RX ADMIN — SODIUM CHLORIDE 1000 ML: 0.9 INJECTION, SOLUTION INTRAVENOUS at 19:08

## 2021-06-27 NOTE — ED PROVIDER NOTES
History  Chief Complaint   Patient presents with    Abdominal Pain     pt c/o right abdominal pain with vomiting and dry heaves today  pt mentioned having intermittent fevers this past week  denies travel/sob/fevers/cough     39year old female complains central upper aching abdominal pain radiating rightward with nausea and vomiting, gagging  Recently switch from Bentyl to Flexeril nausea initially improving  Also taking Questran for chronic diarrhea  No stooling change or bleeding  Notes right chest port changed 3 days ago and nurse noted temperature 99°  Max temp 100 1° since      Abdominal Pain  Pain location: Upper  Pain quality: aching    Pain radiates to:  RUQ  Pain severity:  Severe  Onset quality:  Gradual  Timing:  Constant  Progression:  Worsening  Chronicity:  Recurrent  Context: eating and retching    Context: not diet changes and not trauma    Associated symptoms: diarrhea and vomiting    Associated symptoms: no chest pain and no shortness of breath        Prior to Admission Medications   Prescriptions Last Dose Informant Patient Reported? Taking? AMILoride 5 mg tablet  Self No No   Sig: Take 1 tablet (5 mg total) by mouth daily   B Complex Vitamins (VITAMIN B COMPLEX PO)   Yes No   Sig: Take 1 capsule by mouth   Catheters MISC   No No   Sig: by Does not apply route 2 (two) times a week Port care per [employer], Disp, (SYRINGE 3CC/25GX5/8") 25G X 5/8" 3 ML MISC   No No   Sig: Use once monthly for B12 injection     acetaminophen (TYLENOL) 325 mg tablet   No No   Sig: Take 2 tablets (650 mg total) by mouth every 6 (six) hours as needed for mild pain, headaches or fever   cholestyramine sugar free (QUESTRAN LIGHT) 4 g packet   No No   Sig: Take 1 packet (4 g total) by mouth daily for 15 days   cyanocobalamin 1,000 mcg/mL   No No   Sig: Inject 1 mL (1,000 mcg total) into a muscle every 30 (thirty) days   dicyclomine (BENTYL) 10 mg capsule   No No   Sig: Take 1 capsule (10 mg total) by mouth 3 (three) times a day before meals for 14 days   ergocalciferol (ERGOCALCIFEROL) 1 25 MG (54155 UT) capsule   No No   Sig: Take 1 capsule (50,000 Units total) by mouth 2 (two) times a week Patient takes this med on    Patient taking differently: Take 50,000 Units by mouth once a week  and    escitalopram (LEXAPRO) 10 mg tablet  Self Yes No   Sig: Take 20 mg by mouth daily at bedtime    famotidine (PEPCID) 20 mg tablet   No No   Sig: Take 1 tablet (20 mg total) by mouth 2 (two) times a day   ferrous sulfate 325 (65 Fe) mg tablet   Yes No   Sig: Take 325 mg by mouth every other day Last took 3/9/20   magnesium oxide (MAG-OX) 400 mg tablet   Yes No   Sig: Take 400 mg by mouth   potassium chloride 0 4 mEq/mL   No No   Sig: Infuse 200 mL (80 mEq total) into a venous catheter daily 80 meq in 1 litre bag to be infused daily over 8 hours      Facility-Administered Medications: None       Past Medical History:   Diagnosis Date    H  pylori infection     Hypokalemia     Left lower quadrant abdominal pain 2020    Migraine     Renal disorder     Rhabdomyolysis        Past Surgical History:   Procedure Laterality Date    ABDOMINAL SURGERY      APPENDECTOMY       SECTION      CHOLECYSTECTOMY      COSMETIC SURGERY      "tummy tuck"    FL GUIDED CENTRAL VENOUS ACCESS DEVICE INSERTION  2018    GASTRIC BYPASS      HYSTERECTOMY      LAPAROSCOPY      TUNNELED VENOUS PORT PLACEMENT N/A 2018    Procedure: INSERTION VENOUS PORT (PORT-A-CATH); Surgeon: Carolann Khalil DO;  Location: [facility];  Service: General       Family History   Problem Relation Age of Onset    No Known Problems Mother     Hypertension Father     Diabetes Father     Diabetes Maternal Grandfather      I have reviewed and agree with the history as documented      E-Cigarette/Vaping    E-Cigarette Use Never User      E-Cigarette/Vaping Substances    Nicotine No     THC No     CBD No Social History     Tobacco Use    Smoking status: Never Smoker    Smokeless tobacco: Never Used   Vaping Use    Vaping Use: Never used   Substance Use Topics    Alcohol use: Never     Alcohol/week: 0 0 standard drinks     Comment: 0    Drug use: Never       Review of Systems   Respiratory: Negative for shortness of breath  Cardiovascular: Negative for chest pain  Gastrointestinal: Positive for abdominal pain, diarrhea and vomiting  All other systems reviewed and are negative  Physical Exam  Physical Exam  Vitals and nursing note reviewed  Constitutional:       Comments: Pleasant, comfortable-appearing, conversational, right chest port knee in clean, clear dressing, nontender   HENT:      Head: Normocephalic and atraumatic  Eyes:      Conjunctiva/sclera: Conjunctivae normal       Pupils: Pupils are equal, round, and reactive to light  Cardiovascular:      Rate and Rhythm: Normal rate and regular rhythm  Heart sounds: Normal heart sounds  Pulmonary:      Effort: Pulmonary effort is normal       Breath sounds: Normal breath sounds  Abdominal:      General: Bowel sounds are normal  There is no distension  Palpations: Abdomen is soft  Tenderness: There is no abdominal tenderness  Comments: Indicates central upper area of discomfort and right middle quadrant, but abdomen generally nontender, soft, no epigastric tenderness no rebound or guarding, nondistended   Musculoskeletal:         General: Normal range of motion  Cervical back: Neck supple  Skin:     General: Skin is warm and dry  Neurological:      Mental Status: She is alert and oriented to person, place, and time  Cranial Nerves: No cranial nerve deficit  Coordination: Coordination normal    Psychiatric:         Behavior: Behavior normal          Thought Content:  Thought content normal          Judgment: Judgment normal          Vital Signs  ED Triage Vitals [06/27/21 1858]   Temperature Pulse Respirations Blood Pressure SpO2   (!) 97 2 °F (36 2 °C) 92 17 122/62 99 %      Temp Source Heart Rate Source Patient Position - Orthostatic VS BP Location FiO2 (%)   Temporal Monitor Lying Right arm --      Pain Score       9           Vitals:    06/27/21 1858 06/27/21 1900 06/27/21 1930 06/27/21 2000   BP: 122/62 115/56 116/63 119/63   Pulse: 92 90 81 76   Patient Position - Orthostatic VS: Lying            Visual Acuity      ED Medications  Medications   sodium chloride 0 9 % bolus 1,000 mL (0 mL Intravenous Stopped 6/27/21 2008)       Diagnostic Studies  Results Reviewed     Procedure Component Value Units Date/Time    Lactic acid [[id number]]  (Normal) Collected: 06/27/21 1918    Lab Status: Final result Specimen: Blood from Central Venous Line Updated: 06/27/21 1947     LACTIC ACID 1 1 mmol/L     Narrative:      Result may be elevated if tourniquet was used during collection      Comprehensive metabolic panel [[id number]]  (Abnormal) Collected: 06/27/21 1918    Lab Status: Final result Specimen: Blood from Central Venous Line Updated: 06/27/21 1943     Sodium 137 mmol/L      Potassium 3 6 mmol/L      Chloride 108 mmol/L      CO2 22 mmol/L      ANION GAP 7 mmol/L      BUN 8 mg/dL      Creatinine 0 77 mg/dL      Glucose 82 mg/dL      Calcium 7 3 mg/dL      Corrected Calcium 8 2 mg/dL      AST 25 U/L      ALT 36 U/L      Alkaline Phosphatase 47 U/L      Total Protein 5 7 g/dL      Albumin 2 9 g/dL      Total Bilirubin 0 12 mg/dL      eGFR 99 ml/min/1 73sq m     Narrative:      [place] guidelines for Chronic Kidney Disease (CKD):     Stage 1 with normal or high GFR (GFR > 90 mL/min/1 73 square meters)    Stage 2 Mild CKD (GFR = 60-89 mL/min/1 73 square meters)    Stage 3A Moderate CKD (GFR = 45-59 mL/min/1 73 square meters)    Stage 3B Moderate CKD (GFR = 30-44 mL/min/1 73 square meters)    Stage 4 Severe CKD (GFR = 15-29 mL/min/1 73 square meters)    Stage 5 End Stage CKD (GFR <15 mL/min/1 73 square meters)  Note: GFR calculation is accurate only with a steady state creatinine    Lipase [[id number]]  (Normal) Collected: 06/27/21 1918    Lab Status: Final result Specimen: Blood from Central Venous Line Updated: 06/27/21 1943     Lipase 189 u/L     CBC and differential [[id number]]  (Abnormal) Collected: 06/27/21 1918    Lab Status: Final result Specimen: Blood from Central Venous Line Updated: 06/27/21 1926     WBC 8 43 Thousand/uL      RBC 3 69 Million/uL      Hemoglobin 9 1 g/dL      Hematocrit 30 0 %      MCV 81 fL      MCH 24 7 pg      MCHC 30 3 g/dL      RDW 17 1 %      MPV 10 7 fL      Platelets 336 Thousands/uL      nRBC 0 /100 WBCs      Neutrophils Relative 57 %      Immat GRANS % 0 %      Lymphocytes Relative 33 %      Monocytes Relative 7 %      Eosinophils Relative 2 %      Basophils Relative 1 %      Neutrophils Absolute 4 77 Thousands/µL      Immature Grans Absolute 0 02 Thousand/uL      Lymphocytes Absolute 2 80 Thousands/µL      Monocytes Absolute 0 61 Thousand/µL      Eosinophils Absolute 0 15 Thousand/µL      Basophils Absolute 0 08 Thousands/µL     Blood culture #2 [[id number]] Collected: 06/27/21 1918    Lab Status: In process Specimen: Blood from Arm, Right Updated: 06/27/21 1924    Blood culture #1 [[id number]] Collected: 06/27/21 1918    Lab Status:  In process Specimen: Blood from Hand, Left Updated: 06/27/21 1924                 XR chest portable - 1 view   ED Interpretation by Hellen Jensen DO (06/27 1956)   Normal appearing, similar to prior, no infiltrate                 Procedures  Procedures         ED Course  ED Course as of Jun 27 2321   Sun Jun 27, 2021 1955 LACTIC ACID: 1 1 1955 Lipase: 189   1959 We discussed results, no urinary symptoms, notes pain is similar to yesterday shins and notes taking Tylenol for same, she agrees to follow-up with her family physician and return immediately if worse or new symptoms                                SBIRT 22yo+ Most Recent Value   SBIRT (24 yo +)   In order to provide better care to our patients, we are screening all of our patients for alcohol and drug use  Would it be okay to ask you these screening questions? Yes Filed at: 06/27/2021 1943   Initial Alcohol Screen: US AUDIT-C    1  How often do you have a drink containing alcohol?  0 Filed at: 06/27/2021 1943   2  How many drinks containing alcohol do you have on a typical day you are drinking? 0 Filed at: 06/27/2021 1943   3b  FEMALE Any Age, or MALE 65+: How often do you have 4 or more drinks on one occassion? 0 Filed at: 06/27/2021 1943   Audit-C Score  0 Filed at: 06/27/2021 1943   SLIME: How many times in the past year have you    Used an illegal drug or used a prescription medication for non-medical reasons? Never Filed at: 06/27/2021 1943                    MDM    Disposition  Final diagnoses:   Chronic abdominal pain     Time reflects when diagnosis was documented in both MDM as applicable and the Disposition within this note     Time User Action Codes Description Comment    6/27/2021  8:00 PM Kandis Painting Add [R10 9,  G89 29] Chronic abdominal pain       ED Disposition     ED Disposition Condition Date/Time Comment    Discharge Stable Sun Jun 27, 2021  8:00 PM Jayme Barclay discharge to home/self care              Follow-up Information     Follow up With Specialties Details Why Contact Info    Horace Negron MD Family Medicine Schedule an appointment as soon as possible for a visit in 2 days  [place] [address] [address]  [phone]            Discharge Medication List as of 6/27/2021  8:00 PM      CONTINUE these medications which have NOT CHANGED    Details   acetaminophen (TYLENOL) 325 mg tablet Take 2 tablets (650 mg total) by mouth every 6 (six) hours as needed for mild pain, headaches or fever, Starting Sat 6/19/2021, No Print      AMILoride 5 mg tablet Take 1 tablet (5 mg total) by mouth daily, Starting Wed 2/20/2019, Normal      B Complex Vitamins (VITAMIN B COMPLEX PO) Take 1 capsule by mouth, Historical Med      Catheters MISC by Does not apply route 2 (two) times a week Port care per [employer], Starting Thu 5/7/2020, No Print      cholestyramine sugar free (QUESTRAN LIGHT) 4 g packet Take 1 packet (4 g total) by mouth daily for 15 days, Starting Sun 6/20/2021, Until Mon 7/5/2021, Normal      cyanocobalamin 1,000 mcg/mL Inject 1 mL (1,000 mcg total) into a muscle every 30 (thirty) days, Starting Mon 5/4/2020, Normal      dicyclomine (BENTYL) 10 mg capsule Take 1 capsule (10 mg total) by mouth 3 (three) times a day before meals for 14 days, Starting Sat 6/19/2021, Until Sat 7/3/2021, Normal      ergocalciferol (ERGOCALCIFEROL) 1 25 MG (05928 UT) capsule Take 1 capsule (50,000 Units total) by mouth 2 (two) times a week Patient takes this med on Mondays, Starting Thu 3/26/2020, Normal      escitalopram (LEXAPRO) 10 mg tablet Take 20 mg by mouth daily at bedtime , Historical Med      famotidine (PEPCID) 20 mg tablet Take 1 tablet (20 mg total) by mouth 2 (two) times a day, Starting Sat 6/19/2021, Normal      ferrous sulfate 325 (65 Fe) mg tablet Take 325 mg by mouth every other day Last took 3/9/20, Historical Med      magnesium oxide (MAG-OX) 400 mg tablet Take 400 mg by mouth, Starting Fri 7/24/2020, Until Sat 7/24/2021, Historical Med      potassium chloride 0 4 mEq/mL Infuse 200 mL (80 mEq total) into a venous catheter daily 80 meq in 1 litre bag to be infused daily over 8 hours, Starting Tue 5/11/2021, Print      Syringe/Needle, Disp, (SYRINGE 3CC/25GX5/8") 25G X 5/8" 3 ML MISC Use once monthly for B12 injection  , Normal           No discharge procedures on file      PDMP Review       Value Time User    PDMP Reviewed  Yes 4/21/2021  1:04 PM Humaira Roque PA-C          ED Provider  Electronically Signed by           Zoey Dias DO  06/27/21 3310

## 2021-06-28 NOTE — ED NOTES
Port dressing saturated  Dressing changed  Port flushed  No leaking noted        [address] Leon, ELYSSA  06/27/21 2009

## 2021-06-28 NOTE — DISCHARGE INSTRUCTIONS
Blood cultures pending  Radiologist will review your X-Rays  Tylenol 1000 mg every 6 hours as needed for discomfort  Return immediately if worse or new symptoms

## 2021-07-03 LAB
BACTERIA BLD CULT: NORMAL
BACTERIA BLD CULT: NORMAL

## 2021-07-12 ENCOUNTER — TELEPHONE (OUTPATIENT)
Dept: NEPHROLOGY | Facility: CLINIC | Age: 36
End: 2021-07-12

## 2021-07-12 NOTE — TELEPHONE ENCOUNTER
Yves Brandt from [facility] called he states that patient was in the hospital last week and they wanted to make sure that patient should continue her potassium IV with hydration  I will call and give them a verbal if you want her to continue   [phone]

## 2021-08-14 ENCOUNTER — APPOINTMENT (EMERGENCY)
Dept: CT IMAGING | Facility: HOSPITAL | Age: 36
End: 2021-08-14
Payer: COMMERCIAL

## 2021-08-14 ENCOUNTER — HOSPITAL ENCOUNTER (EMERGENCY)
Facility: HOSPITAL | Age: 36
Discharge: HOME | End: 2021-08-14
Attending: EMERGENCY MEDICINE
Payer: COMMERCIAL

## 2021-08-14 VITALS
HEIGHT: 63 IN | SYSTOLIC BLOOD PRESSURE: 120 MMHG | TEMPERATURE: 97.7 F | DIASTOLIC BLOOD PRESSURE: 70 MMHG | BODY MASS INDEX: 23.75 KG/M2 | WEIGHT: 134.04 LBS | RESPIRATION RATE: 17 BRPM | OXYGEN SATURATION: 100 % | HEART RATE: 89 BPM

## 2021-08-14 DIAGNOSIS — N39.0 UTI (URINARY TRACT INFECTION): ICD-10-CM

## 2021-08-14 DIAGNOSIS — K52.9 COLITIS: ICD-10-CM

## 2021-08-14 DIAGNOSIS — E87.6 HYPOKALEMIA: ICD-10-CM

## 2021-08-14 DIAGNOSIS — R10.9 ABDOMINAL PAIN: ICD-10-CM

## 2021-08-14 DIAGNOSIS — R11.2 NON-INTRACTABLE VOMITING WITH NAUSEA, UNSPECIFIED VOMITING TYPE: Primary | ICD-10-CM

## 2021-08-14 LAB
ALBUMIN SERPL BCP-MCNC: 3.8 G/DL (ref 3.5–5)
ALP SERPL-CCNC: 48 U/L (ref 46–116)
ALT SERPL W P-5'-P-CCNC: 36 U/L (ref 12–78)
AMPHETAMINES SERPL QL SCN: NEGATIVE
ANION GAP SERPL CALCULATED.3IONS-SCNC: 15 MMOL/L (ref 4–13)
AST SERPL W P-5'-P-CCNC: 23 U/L (ref 5–45)
BACTERIA UR QL AUTO: NORMAL /HPF
BARBITURATES UR QL: NEGATIVE
BASOPHILS # BLD AUTO: 0.07 THOUSANDS/ΜL (ref 0–0.1)
BASOPHILS NFR BLD AUTO: 1 % (ref 0–1)
BENZODIAZ UR QL: NEGATIVE
BILIRUB SERPL-MCNC: 0.21 MG/DL (ref 0.2–1)
BILIRUB UR QL STRIP: NEGATIVE
BUN SERPL-MCNC: 16 MG/DL (ref 5–25)
CALCIUM SERPL-MCNC: 8.4 MG/DL (ref 8.3–10.1)
CHLORIDE SERPL-SCNC: 102 MMOL/L (ref 100–108)
CLARITY UR: ABNORMAL
CO2 SERPL-SCNC: 21 MMOL/L (ref 21–32)
COCAINE UR QL: NEGATIVE
COLOR UR: YELLOW
CREAT SERPL-MCNC: 0.83 MG/DL (ref 0.6–1.3)
EOSINOPHIL # BLD AUTO: 0.04 THOUSAND/ΜL (ref 0–0.61)
EOSINOPHIL NFR BLD AUTO: 1 % (ref 0–6)
ERYTHROCYTE [DISTWIDTH] IN BLOOD BY AUTOMATED COUNT: 16.5 % (ref 11.6–15.1)
GFR SERPL CREATININE-BSD FRML MDRD: 90 ML/MIN/1.73SQ M
GLUCOSE SERPL-MCNC: 83 MG/DL (ref 65–140)
GLUCOSE UR STRIP-MCNC: NEGATIVE MG/DL
HCT VFR BLD AUTO: 35.1 % (ref 34.8–46.1)
HGB BLD-MCNC: 11.2 G/DL (ref 11.5–15.4)
HGB UR QL STRIP.AUTO: NEGATIVE
IMM GRANULOCYTES # BLD AUTO: 0.02 THOUSAND/UL (ref 0–0.2)
IMM GRANULOCYTES NFR BLD AUTO: 0 % (ref 0–2)
KETONES UR STRIP-MCNC: NEGATIVE MG/DL
LEUKOCYTE ESTERASE UR QL STRIP: NEGATIVE
LIPASE SERPL-CCNC: 738 U/L (ref 73–393)
LYMPHOCYTES # BLD AUTO: 2.05 THOUSANDS/ΜL (ref 0.6–4.47)
LYMPHOCYTES NFR BLD AUTO: 29 % (ref 14–44)
MAGNESIUM SERPL-MCNC: 2.3 MG/DL (ref 1.6–2.6)
MCH RBC QN AUTO: 24.2 PG (ref 26.8–34.3)
MCHC RBC AUTO-ENTMCNC: 31.9 G/DL (ref 31.4–37.4)
MCV RBC AUTO: 76 FL (ref 82–98)
METHADONE UR QL: NEGATIVE
MONOCYTES # BLD AUTO: 0.63 THOUSAND/ΜL (ref 0.17–1.22)
MONOCYTES NFR BLD AUTO: 9 % (ref 4–12)
NEUTROPHILS # BLD AUTO: 4.34 THOUSANDS/ΜL (ref 1.85–7.62)
NEUTS SEG NFR BLD AUTO: 60 % (ref 43–75)
NITRITE UR QL STRIP: POSITIVE
NON-SQ EPI CELLS URNS QL MICRO: NORMAL /HPF
NRBC BLD AUTO-RTO: 0 /100 WBCS
OPIATES UR QL SCN: POSITIVE
OXYCODONE+OXYMORPHONE UR QL SCN: NEGATIVE
PCP UR QL: NEGATIVE
PH UR STRIP.AUTO: 7 [PH]
PLATELET # BLD AUTO: 427 THOUSANDS/UL (ref 149–390)
PMV BLD AUTO: 10.3 FL (ref 8.9–12.7)
POTASSIUM SERPL-SCNC: 3 MMOL/L (ref 3.5–5.3)
PROT SERPL-MCNC: 7.7 G/DL (ref 6.4–8.2)
PROT UR STRIP-MCNC: ABNORMAL MG/DL
RBC # BLD AUTO: 4.63 MILLION/UL (ref 3.81–5.12)
RBC #/AREA URNS AUTO: NORMAL /HPF
SODIUM SERPL-SCNC: 138 MMOL/L (ref 136–145)
SP GR UR STRIP.AUTO: <=1.005 (ref 1–1.03)
THC UR QL: NEGATIVE
UROBILINOGEN UR QL STRIP.AUTO: 0.2 E.U./DL
WBC # BLD AUTO: 7.15 THOUSAND/UL (ref 4.31–10.16)
WBC #/AREA URNS AUTO: NORMAL /HPF

## 2021-08-14 PROCEDURE — 74177 CT ABD & PELVIS W/CONTRAST: CPT

## 2021-08-14 PROCEDURE — 83690 ASSAY OF LIPASE: CPT | Performed by: EMERGENCY MEDICINE

## 2021-08-14 PROCEDURE — 80053 COMPREHEN METABOLIC PANEL: CPT | Performed by: EMERGENCY MEDICINE

## 2021-08-14 PROCEDURE — 36415 COLL VENOUS BLD VENIPUNCTURE: CPT | Performed by: EMERGENCY MEDICINE

## 2021-08-14 PROCEDURE — 96366 THER/PROPH/DIAG IV INF ADDON: CPT

## 2021-08-14 PROCEDURE — 96376 TX/PRO/DX INJ SAME DRUG ADON: CPT

## 2021-08-14 PROCEDURE — 93005 ELECTROCARDIOGRAM TRACING: CPT

## 2021-08-14 PROCEDURE — 96375 TX/PRO/DX INJ NEW DRUG ADDON: CPT

## 2021-08-14 PROCEDURE — 96365 THER/PROPH/DIAG IV INF INIT: CPT

## 2021-08-14 PROCEDURE — 83735 ASSAY OF MAGNESIUM: CPT | Performed by: EMERGENCY MEDICINE

## 2021-08-14 PROCEDURE — 80307 DRUG TEST PRSMV CHEM ANLYZR: CPT | Performed by: EMERGENCY MEDICINE

## 2021-08-14 PROCEDURE — 99285 EMERGENCY DEPT VISIT HI MDM: CPT | Performed by: EMERGENCY MEDICINE

## 2021-08-14 PROCEDURE — 81001 URINALYSIS AUTO W/SCOPE: CPT | Performed by: EMERGENCY MEDICINE

## 2021-08-14 PROCEDURE — 99284 EMERGENCY DEPT VISIT MOD MDM: CPT

## 2021-08-14 PROCEDURE — 85025 COMPLETE CBC W/AUTO DIFF WBC: CPT | Performed by: EMERGENCY MEDICINE

## 2021-08-14 RX ORDER — CIPROFLOXACIN 500 MG/1
500 TABLET, FILM COATED ORAL 2 TIMES DAILY
Qty: 20 TABLET | Refills: 0 | Status: SHIPPED | OUTPATIENT
Start: 2021-08-14 | End: 2021-08-24

## 2021-08-14 RX ORDER — ONDANSETRON 4 MG/1
4 TABLET, FILM COATED ORAL EVERY 6 HOURS
Qty: 12 TABLET | Refills: 0 | Status: SHIPPED | OUTPATIENT
Start: 2021-08-14 | End: 2021-09-17 | Stop reason: ALTCHOICE

## 2021-08-14 RX ORDER — TRAMADOL HYDROCHLORIDE 50 MG/1
50 TABLET ORAL EVERY 6 HOURS PRN
Qty: 12 TABLET | Refills: 0 | Status: SHIPPED | OUTPATIENT
Start: 2021-08-14 | End: 2021-09-17 | Stop reason: ALTCHOICE

## 2021-08-14 RX ORDER — METRONIDAZOLE 500 MG/1
500 TABLET ORAL EVERY 8 HOURS SCHEDULED
Qty: 30 TABLET | Refills: 0 | Status: SHIPPED | OUTPATIENT
Start: 2021-08-14 | End: 2021-08-24

## 2021-08-14 RX ORDER — ONDANSETRON 2 MG/ML
4 INJECTION INTRAMUSCULAR; INTRAVENOUS ONCE
Status: COMPLETED | OUTPATIENT
Start: 2021-08-14 | End: 2021-08-14

## 2021-08-14 RX ORDER — POTASSIUM CHLORIDE 14.9 MG/ML
20 INJECTION INTRAVENOUS
Status: COMPLETED | OUTPATIENT
Start: 2021-08-14 | End: 2021-08-14

## 2021-08-14 RX ORDER — MORPHINE SULFATE 4 MG/ML
4 INJECTION, SOLUTION INTRAMUSCULAR; INTRAVENOUS ONCE
Status: COMPLETED | OUTPATIENT
Start: 2021-08-14 | End: 2021-08-14

## 2021-08-14 RX ADMIN — POTASSIUM CHLORIDE 20 MEQ: 14.9 INJECTION, SOLUTION INTRAVENOUS at 17:05

## 2021-08-14 RX ADMIN — MORPHINE SULFATE 4 MG: 4 INJECTION INTRAVENOUS at 18:32

## 2021-08-14 RX ADMIN — FAMOTIDINE 20 MG: 10 INJECTION, SOLUTION INTRAVENOUS at 13:36

## 2021-08-14 RX ADMIN — ONDANSETRON 4 MG: 2 INJECTION INTRAMUSCULAR; INTRAVENOUS at 18:32

## 2021-08-14 RX ADMIN — MORPHINE SULFATE 2 MG: 2 INJECTION, SOLUTION INTRAMUSCULAR; INTRAVENOUS at 14:49

## 2021-08-14 RX ADMIN — POTASSIUM CHLORIDE 20 MEQ: 14.9 INJECTION, SOLUTION INTRAVENOUS at 14:50

## 2021-08-14 RX ADMIN — IOHEXOL 100 ML: 350 INJECTION, SOLUTION INTRAVENOUS at 15:31

## 2021-08-14 RX ADMIN — ONDANSETRON 4 MG: 2 INJECTION INTRAMUSCULAR; INTRAVENOUS at 13:36

## 2021-08-14 NOTE — ED PROVIDER NOTES
History  Chief Complaint   Patient presents with    Vomiting     pt states she has been vomiting for 3 days  she has a port on her chest wall in which she gets potassium infusions at home every day for 8 hours  last infusion last night  called PCP and was told to come to ED for K evaluation  Patient is a 18-year-old female with complicated medical history, presenting to the emergency department complaining of vomiting x3 days with pain in her lower abdomen, patient has a history of persistent hypokalemia and has a right anterior chest wall port which she takes daily potassium infusions of 80 mg once daily, she has had gastric bypass surgery, appendectomy, cholecystectomy, she denies a fever or chills, no cough, cold or congestion, no diarrhea, no urinary symptoms, no sick contacts, patient is fully vaccinated for COVID          Prior to Admission Medications   Prescriptions Last Dose Informant Patient Reported? Taking? AMILoride 5 mg tablet 8/13/2021 at Unknown time Self No Yes   Sig: Take 1 tablet (5 mg total) by mouth daily   B Complex Vitamins (VITAMIN B COMPLEX PO) 8/13/2021 at Unknown time  Yes Yes   Sig: Take 1 capsule by mouth   Catheters MISC   No No   Sig: by Does not apply route 2 (two) times a week Port care per [facility] [facility], Disp, (SYRINGE 3CC/25GX5/8") 25G X 5/8" 3 ML MISC   No No   Sig: Use once monthly for B12 injection     cyanocobalamin 1,000 mcg/mL   No Yes   Sig: Inject 1 mL (1,000 mcg total) into a muscle every 30 (thirty) days   dicyclomine (BENTYL) 10 mg capsule 8/13/2021 at Unknown time  No Yes   Sig: Take 1 capsule (10 mg total) by mouth 3 (three) times a day before meals for 14 days   ergocalciferol (ERGOCALCIFEROL) 1 25 MG (50554 UT) capsule Past Week at Unknown time  No Yes   Sig: Take 1 capsule (50,000 Units total) by mouth 2 (two) times a week Patient takes this med on Mondays   Patient taking differently: Take 50,000 Units by mouth once a week Mondays and    escitalopram (LEXAPRO) 10 mg tablet 2021 at Unknown time Self Yes Yes   Sig: Take 20 mg by mouth daily at bedtime    ferrous sulfate 325 (65 Fe) mg tablet   Yes Yes   Sig: Take 325 mg by mouth every other day Last took 3/9/20   potassium chloride 0 4 mEq/mL   No Yes   Sig: Infuse 200 mL (80 mEq total) into a venous catheter daily 80 meq in 1 litre bag to be infused daily over 8 hours      Facility-Administered Medications: None       Past Medical History:   Diagnosis Date    H  pylori infection     Hypokalemia     Left lower quadrant abdominal pain 2020    Migraine     Renal disorder     Rhabdomyolysis        Past Surgical History:   Procedure Laterality Date    ABDOMINAL SURGERY      APPENDECTOMY       SECTION      CHOLECYSTECTOMY      COSMETIC SURGERY      "tummy tuck"    FL GUIDED CENTRAL VENOUS ACCESS DEVICE INSERTION  2018    GASTRIC BYPASS      HYSTERECTOMY      LAPAROSCOPY      TUNNELED VENOUS PORT PLACEMENT N/A 2018    Procedure: INSERTION VENOUS PORT (PORT-A-CATH); Surgeon: Sylvia Murillo DO;  Location: [facility];  Service: General       Family History   Problem Relation Age of Onset    No Known Problems Mother     Hypertension Father     Diabetes Father     Diabetes Maternal Grandfather      I have reviewed and agree with the history as documented  E-Cigarette/Vaping    E-Cigarette Use Never User      E-Cigarette/Vaping Substances    Nicotine No     THC No     CBD No      Social History     Tobacco Use    Smoking status: Never Smoker    Smokeless tobacco: Never Used   Vaping Use    Vaping Use: Never used   Substance Use Topics    Alcohol use: Never     Alcohol/week: 0 0 standard drinks     Comment: 0    Drug use: Never       Review of Systems   Constitutional: Negative  HENT: Negative  Eyes: Negative  Respiratory: Negative  Cardiovascular: Negative  Gastrointestinal: Positive for abdominal pain, nausea and vomiting  Endocrine: Negative  Genitourinary: Negative  Musculoskeletal: Negative  Skin: Negative  Allergic/Immunologic: Negative  Neurological: Negative  Hematological: Negative  Psychiatric/Behavioral: Negative  Physical Exam  Physical Exam  Constitutional:       Appearance: Normal appearance  She is well-developed  HENT:      Head: Normocephalic and atraumatic  Eyes:      Pupils: Pupils are equal, round, and reactive to light  Cardiovascular:      Rate and Rhythm: Normal rate and regular rhythm  Pulmonary:      Effort: Pulmonary effort is normal       Breath sounds: Normal breath sounds  Abdominal:      Tenderness: There is abdominal tenderness in the suprapubic area and left lower quadrant  Musculoskeletal:         General: Normal range of motion  Cervical back: Normal range of motion and neck supple  Skin:     General: Skin is warm and dry  Neurological:      Mental Status: She is alert and oriented to person, place, and time           Vital Signs  ED Triage Vitals   Temperature Pulse Respirations Blood Pressure SpO2   08/14/21 1305 08/14/21 1305 08/14/21 1305 08/14/21 1305 08/14/21 1305   97 7 °F (36 5 °C) 99 16 133/79 100 %      Temp Source Heart Rate Source Patient Position - Orthostatic VS BP Location FiO2 (%)   08/14/21 1305 08/14/21 1305 08/14/21 1305 08/14/21 1305 --   Temporal Monitor Lying Right arm       Pain Score       08/14/21 1449       8           Vitals:    08/14/21 1530 08/14/21 1545 08/14/21 1615 08/14/21 1831   BP: 111/60 112/59 112/58 120/70   Pulse: 80 79 80 89   Patient Position - Orthostatic VS:                   ED Medications  Medications   ondansetron (ZOFRAN) injection 4 mg (4 mg Intravenous Given 8/14/21 1336)   famotidine (PEPCID) injection 20 mg (20 mg Intravenous Given 8/14/21 1336)   morphine injection 2 mg (2 mg Intravenous Given 8/14/21 1449)   potassium chloride 20 mEq IVPB (premix) (0 mEq Intravenous Stopped 8/14/21 1905)   iohexol (OMNIPAQUE) 350 MG/ML injection (SINGLE-DOSE) 100 mL (100 mL Intravenous Given 8/14/21 1531)   morphine (PF) 4 mg/mL injection 4 mg (4 mg Intravenous Given 8/14/21 1832)   ondansetron (ZOFRAN) injection 4 mg (4 mg Intravenous Given 8/14/21 1832)       Diagnostic Studies  Results Reviewed     Procedure Component Value Units Date/Time    Rapid drug screen, urine [[id number]]  (Abnormal) Collected: 08/14/21 1705    Lab Status: Final result Specimen: Urine, Clean Catch Updated: 08/14/21 1733     Amph/Meth UR Negative     Barbiturate Ur Negative     Benzodiazepine Urine Negative     Cocaine Urine Negative     Methadone Urine Negative     Opiate Urine Positive     PCP Ur Negative     THC Urine Negative     Oxycodone Urine Negative    Narrative:      Presumptive report  If requested, specimen will be sent to reference lab for confirmation  FOR MEDICAL PURPOSES ONLY  IF CONFIRMATION NEEDED PLEASE CONTACT THE LAB WITHIN 5 DAYS      Drug Screen Cutoff Levels:  AMPHETAMINE/METHAMPHETAMINES  1000 ng/mL  BARBITURATES     200 ng/mL  BENZODIAZEPINES     200 ng/mL  COCAINE      300 ng/mL  METHADONE      300 ng/mL  OPIATES      300 ng/mL  PHENCYCLIDINE     25 ng/mL  THC       50 ng/mL  OXYCODONE      100 ng/mL    Urine Microscopic [[id number]]  (Normal) Collected: 08/14/21 1705    Lab Status: Final result Specimen: Urine, Clean Catch Updated: 08/14/21 1722     RBC, UA 1-2 /hpf      WBC, UA 0-5 /hpf      Epithelial Cells Occasional /hpf      Bacteria, UA Occasional /hpf     UA w Reflex to Microscopic w Reflex to Culture [[id number]]  (Abnormal) Collected: 08/14/21 1705    Lab Status: Final result Specimen: Urine, Clean Catch Updated: 08/14/21 1714     Color, UA Yellow     Clarity, UA Cloudy     Specific Gravity, UA <=1 005     pH, UA 7 0     Leukocytes, UA Negative     Nitrite, UA Positive     Protein, UA Trace mg/dl      Glucose, UA Negative mg/dl      Ketones, UA Negative mg/dl      Urobilinogen, UA 0 2 E U /dl      Bilirubin, UA Negative     Blood, UA Negative    CMP [[id number]]  (Abnormal) Collected: 08/14/21 1335    Lab Status: Final result Specimen: Blood from Central Venous Line Updated: 08/14/21 1401     Sodium 138 mmol/L      Potassium 3 0 mmol/L      Chloride 102 mmol/L      CO2 21 mmol/L      ANION GAP 15 mmol/L      BUN 16 mg/dL      Creatinine 0 83 mg/dL      Glucose 83 mg/dL      Calcium 8 4 mg/dL      AST 23 U/L      ALT 36 U/L      Alkaline Phosphatase 48 U/L      Total Protein 7 7 g/dL      Albumin 3 8 g/dL      Total Bilirubin 0 21 mg/dL      eGFR 90 ml/min/1 73sq m     Narrative:      National Kidney Disease Foundation guidelines for Chronic Kidney Disease (CKD):     Stage 1 with normal or high GFR (GFR > 90 mL/min/1 73 square meters)    Stage 2 Mild CKD (GFR = 60-89 mL/min/1 73 square meters)    Stage 3A Moderate CKD (GFR = 45-59 mL/min/1 73 square meters)    Stage 3B Moderate CKD (GFR = 30-44 mL/min/1 73 square meters)    Stage 4 Severe CKD (GFR = 15-29 mL/min/1 73 square meters)    Stage 5 End Stage CKD (GFR <15 mL/min/1 73 square meters)  Note: GFR calculation is accurate only with a steady state creatinine    Lipase [[id number]]  (Abnormal) Collected: 08/14/21 1335    Lab Status: Final result Specimen: Blood from Central Venous Line Updated: 08/14/21 1401     Lipase 738 u/L     Magnesium [[id number]]  (Normal) Collected: 08/14/21 1335    Lab Status: Final result Specimen: Blood from Central Venous Line Updated: 08/14/21 1401     Magnesium 2 3 mg/dL     CBC and differential [[id number]]  (Abnormal) Collected: 08/14/21 1335    Lab Status: Final result Specimen: Blood from Central Venous Line Updated: 08/14/21 1343     WBC 7 15 Thousand/uL      RBC 4 63 Million/uL      Hemoglobin 11 2 g/dL      Hematocrit 35 1 %      MCV 76 fL      MCH 24 2 pg      MCHC 31 9 g/dL      RDW 16 5 %      MPV 10 3 fL      Platelets 291 Thousands/uL      nRBC 0 /100 WBCs      Neutrophils Relative 60 %      Immat GRANS % 0 %      Lymphocytes Relative 29 %      Monocytes Relative 9 %      Eosinophils Relative 1 %      Basophils Relative 1 %      Neutrophils Absolute 4 34 Thousands/µL      Immature Grans Absolute 0 02 Thousand/uL      Lymphocytes Absolute 2 05 Thousands/µL      Monocytes Absolute 0 63 Thousand/µL      Eosinophils Absolute 0 04 Thousand/µL      Basophils Absolute 0 07 Thousands/µL                  CT abdomen pelvis with contrast   Final Result by Shannan Chew MD (08/14 8737)      Possible colitis, correlate clinically              Workstation performed: [id number]                    Procedures  ECG 12 Lead Documentation Only    Date/Time: 8/14/2021 2:31 PM  Performed by: Sulaiman Ernandez DO  Authorized by: Sulaiman Ernandez DO     Indications / Diagnosis:  Abdominal pain  ECG reviewed by me, the ED Provider: yes    Patient location:  ED  Previous ECG:     Previous ECG:  Compared to current    Comparison ECG info:  04/21/2021    Similarity:  Changes noted    Comparison to cardiac monitor: Yes    Interpretation:     Interpretation: normal    Rate:     ECG rate:  83    ECG rate assessment: normal    Rhythm:     Rhythm: sinus rhythm    Ectopy:     Ectopy: none    QRS:     QRS intervals:  Normal  Conduction:     Conduction: normal    ST segments:     ST segments:  Normal  T waves:     T waves: flattening and inverted      Flattening:  I and aVL    Inverted:  II, III, aVF, V1 and V4             ED Course  ED Course as of Aug 14 2141   Sat Aug 14, 2021   1431 Patient resting comfortably with stable vital signs, no vomiting since arriving to the emergency department but continues to complain of pain and nausea, lab findings were discussed at bedside, significant for elevated lipase and decreased potassium, possible pancreatitis, she will be provided with IV potassium replacement, pain medication, and a CT scan of the abdomen and pelvis is pending                                                Disposition  Final diagnoses:   Non-intractable vomiting with nausea, unspecified vomiting type   Colitis   UTI (urinary tract infection)   Hypokalemia   Abdominal pain     Time reflects when diagnosis was documented in both MDM as applicable and the Disposition within this note     Time User Action Codes Description Comment    8/14/2021  5:23 PM Lillia Pac Add [R11 2] Non-intractable vomiting with nausea, unspecified vomiting type     8/14/2021  5:23 PM Lillia Pac Add [K52 9] Colitis     8/14/2021  5:23 PM Tequila Jaquez Add [N39 0] UTI (urinary tract infection)     8/14/2021  5:23 PM Lillia Pac Add [E87 6] Hypokalemia     8/14/2021  5:40 PM Tequila Jaquez Add [R10 9] Abdominal pain       ED Disposition     ED Disposition Condition Date/Time Comment    Discharge Stable Sat Aug 14, 2021  5:23 PM Rj Amador discharge to home/self care              Follow-up Information     Follow up With Specialties Details Why [address] Babb IV, MD Family Medicine In 3 days  [place] [address] [address]  [phone]            Discharge Medication List as of 8/14/2021  5:25 PM      START taking these medications    Details   ciprofloxacin (CIPRO) 500 mg tablet Take 1 tablet (500 mg total) by mouth 2 (two) times a day for 10 days, Starting Sat 8/14/2021, Until Tue 8/24/2021, Normal      metroNIDAZOLE (FLAGYL) 500 mg tablet Take 1 tablet (500 mg total) by mouth every 8 (eight) hours for 10 days, Starting Sat 8/14/2021, Until Tue 8/24/2021, Normal      ondansetron (ZOFRAN) 4 mg tablet Take 1 tablet (4 mg total) by mouth every 6 (six) hours, Starting Sat 8/14/2021, Normal         CONTINUE these medications which have NOT CHANGED    Details   AMILoride 5 mg tablet Take 1 tablet (5 mg total) by mouth daily, Starting Wed 2/20/2019, Normal      B Complex Vitamins (VITAMIN B COMPLEX PO) Take 1 capsule by mouth, Historical Med      cyanocobalamin 1,000 mcg/mL Inject 1 mL (1,000 mcg total) into a muscle every 30 (thirty) days, Starting Mon 5/4/2020, Normal      dicyclomine (BENTYL) 10 mg capsule Take 1 capsule (10 mg total) by mouth 3 (three) times a day before meals for 14 days, Starting Sat 6/19/2021, Until Sat 8/14/2021, Normal      ergocalciferol (ERGOCALCIFEROL) 1 25 MG (10404 UT) capsule Take 1 capsule (50,000 Units total) by mouth 2 (two) times a week Patient takes this med on Mondays, Starting Thu 3/26/2020, Normal      escitalopram (LEXAPRO) 10 mg tablet Take 20 mg by mouth daily at bedtime , Historical Med      ferrous sulfate 325 (65 Fe) mg tablet Take 325 mg by mouth every other day Last took 3/9/20, Historical Med      potassium chloride 0 4 mEq/mL Infuse 200 mL (80 mEq total) into a venous catheter daily 80 meq in 1 litre bag to be infused daily over 8 hours, Starting Tue 5/11/2021, Print      Catheters MISC by Does not apply route 2 (two) times a week Port care per [employer], Starting Thu 5/7/2020, No Print      Syringe/Needle, Disp, (SYRINGE 3CC/25GX5/8") 25G X 5/8" 3 ML MISC Use once monthly for B12 injection  , Normal           No discharge procedures on file      PDMP Review       Value Time User    PDMP Reviewed  Yes 4/21/2021  1:04 PM Lauren Joel PA-C          ED Provider  Electronically Signed by           Tana Draper DO  08/14/21 9262

## 2021-08-16 ENCOUNTER — APPOINTMENT (OUTPATIENT)
Dept: LAB | Facility: HOSPITAL | Age: 36
End: 2021-08-16
Attending: INTERNAL MEDICINE
Payer: COMMERCIAL

## 2021-08-16 DIAGNOSIS — K90.89 OTHER INTESTINAL MALABSORPTION: ICD-10-CM

## 2021-08-16 LAB
ANION GAP SERPL CALCULATED.3IONS-SCNC: 16 MMOL/L (ref 4–13)
BUN SERPL-MCNC: 12 MG/DL (ref 5–25)
CALCIUM SERPL-MCNC: 8.4 MG/DL (ref 8.3–10.1)
CHLORIDE SERPL-SCNC: 101 MMOL/L (ref 100–108)
CO2 SERPL-SCNC: 19 MMOL/L (ref 21–32)
CREAT SERPL-MCNC: 1.05 MG/DL (ref 0.6–1.3)
GFR SERPL CREATININE-BSD FRML MDRD: 68 ML/MIN/1.73SQ M
GLUCOSE P FAST SERPL-MCNC: 97 MG/DL (ref 65–99)
MAGNESIUM SERPL-MCNC: 2.1 MG/DL (ref 1.6–2.6)
POTASSIUM SERPL-SCNC: 3.3 MMOL/L (ref 3.5–5.3)
SODIUM SERPL-SCNC: 136 MMOL/L (ref 136–145)

## 2021-08-16 PROCEDURE — 83735 ASSAY OF MAGNESIUM: CPT

## 2021-08-16 PROCEDURE — 36415 COLL VENOUS BLD VENIPUNCTURE: CPT

## 2021-08-16 PROCEDURE — 80048 BASIC METABOLIC PNL TOTAL CA: CPT

## 2021-08-21 LAB
ATRIAL RATE: 83 BPM
P AXIS: 60 DEGREES
PR INTERVAL: 120 MS
QRS AXIS: 73 DEGREES
QRSD INTERVAL: 106 MS
QT INTERVAL: 358 MS
QTC INTERVAL: 420 MS
T WAVE AXIS: 27 DEGREES
VENTRICULAR RATE: 83 BPM

## 2021-08-21 PROCEDURE — 93010 ELECTROCARDIOGRAM REPORT: CPT | Performed by: INTERNAL MEDICINE

## 2021-08-26 ENCOUNTER — TELEPHONE (OUTPATIENT)
Dept: NEPHROLOGY | Facility: CLINIC | Age: 36
End: 2021-08-26

## 2021-08-26 NOTE — TELEPHONE ENCOUNTER
Ngoc Reddy from [facility] calling in to report patients potassium level  She had labs drawn on 8/23 and the potassium came back at 6 2   Ngoc Reddy can be reached at [phone] 10-Aug-2018 18:49

## 2021-08-26 NOTE — TELEPHONE ENCOUNTER
Tell patient to hold her next potassium supplement or infusion and immediately get BMP done- I placed a stat order  Let her know her potassium was elevated but this was 3 days ago  It needs to be repeated  It may be lower or higher at this point      Call placed to [facility]

## 2021-08-30 NOTE — TELEPHONE ENCOUNTER
I called patient to see if she is going for lab work  She did not answer so I LVM asking to call back  Just an fyi PT hasn't had labs done yet

## 2021-08-31 ENCOUNTER — TELEPHONE (OUTPATIENT)
Dept: NEPHROLOGY | Facility: CLINIC | Age: 36
End: 2021-08-31

## 2021-08-31 NOTE — TELEPHONE ENCOUNTER
Called patients  and he gave the phone to Rusty  Spoke with patient and she said she never went for labs because she didn't have anyone to take her  I asked her to please call our office to update us if that happens again  She understands  She said she had labs drawn yesterday by [facility] and hasn't heard back yet to see what her level is  I aslked her if she has been skipping her infusions since she had the high K level and no repeat labs  She said she skipped one and then continued with the infusions  I called [facility] at [phone] to see if they received labs and they haven't received them yet  He said he will fax to us  [facility] then told me that Rusty told him that she had missed a couple infusions last week and "doubled up" on her infusions  So she was taking 2 80 MEQ bags in one session  Once we receive labs I will forward them to you

## 2021-08-31 NOTE — TELEPHONE ENCOUNTER
----- Message from Dalia Holley sent at 8/31/2021  1:31 PM EDT -----  Please try calling her  regarding lab work/potassium issue

## 2021-09-01 NOTE — TELEPHONE ENCOUNTER
Okay well that's good to know  She should continue her infusions as scheduled  She should not double up on infusions unless directed by a provider

## 2021-09-01 NOTE — TELEPHONE ENCOUNTER
We never received lab work results from [facility]  I called [facility] and they relayed that patients potassium was 4 3  This lab work was drawn on 8/30 FYI     [facility] is faxing us the results and I will scan into pt chart

## 2021-09-10 DIAGNOSIS — L08.9 SKIN INFECTION: Primary | ICD-10-CM

## 2021-09-10 RX ORDER — CEPHALEXIN 250 MG/1
500 CAPSULE ORAL 4 TIMES DAILY
Qty: 112 CAPSULE | Refills: 0 | Status: ON HOLD | OUTPATIENT
Start: 2021-09-10 | End: 2021-09-23

## 2021-09-10 NOTE — PROGRESS NOTES
I spoke with patient's home nurse yesterday regarding new erythema noted around the site of the patient's Port-A-Cath  This is new however, the patient explained that she had mild erythema about a week or so ago which then worsened over last 24-48 hours  Patient currently denies fevers or chills, there is no discharge from the area  Cephalexin 500 mg 4 times a day for the next 14 days was called in to her pharmacy  If the patient develops worsening symptoms or worsening local erythema, she is to immediately go to the local emergency department for further evaluation and care      Che Zarate DO

## 2021-09-16 ENCOUNTER — HOSPITAL ENCOUNTER (EMERGENCY)
Facility: HOSPITAL | Age: 36
Discharge: HOME | End: 2021-09-16
Attending: EMERGENCY MEDICINE
Payer: COMMERCIAL

## 2021-09-16 ENCOUNTER — APPOINTMENT (EMERGENCY)
Dept: CT IMAGING | Facility: HOSPITAL | Age: 36
End: 2021-09-16
Payer: COMMERCIAL

## 2021-09-16 ENCOUNTER — TELEPHONE (OUTPATIENT)
Dept: SURGERY | Facility: CLINIC | Age: 36
End: 2021-09-16

## 2021-09-16 VITALS
TEMPERATURE: 97.7 F | DIASTOLIC BLOOD PRESSURE: 54 MMHG | SYSTOLIC BLOOD PRESSURE: 105 MMHG | HEART RATE: 82 BPM | OXYGEN SATURATION: 100 % | RESPIRATION RATE: 16 BRPM

## 2021-09-16 DIAGNOSIS — R07.89 CHEST WALL DISCOMFORT: Primary | ICD-10-CM

## 2021-09-16 DIAGNOSIS — Z78.9 PROBLEM WITH VASCULAR ACCESS: ICD-10-CM

## 2021-09-16 LAB
ANION GAP SERPL CALCULATED.3IONS-SCNC: 10 MMOL/L (ref 4–13)
BASOPHILS # BLD AUTO: 0.06 THOUSANDS/ΜL (ref 0–0.1)
BASOPHILS NFR BLD AUTO: 1 % (ref 0–1)
BUN SERPL-MCNC: 9 MG/DL (ref 5–25)
CALCIUM SERPL-MCNC: 7.9 MG/DL (ref 8.3–10.1)
CHLORIDE SERPL-SCNC: 108 MMOL/L (ref 100–108)
CO2 SERPL-SCNC: 21 MMOL/L (ref 21–32)
CREAT SERPL-MCNC: 0.84 MG/DL (ref 0.6–1.3)
EOSINOPHIL # BLD AUTO: 0.05 THOUSAND/ΜL (ref 0–0.61)
EOSINOPHIL NFR BLD AUTO: 1 % (ref 0–6)
ERYTHROCYTE [DISTWIDTH] IN BLOOD BY AUTOMATED COUNT: 16.6 % (ref 11.6–15.1)
GFR SERPL CREATININE-BSD FRML MDRD: 89 ML/MIN/1.73SQ M
GLUCOSE SERPL-MCNC: 78 MG/DL (ref 65–140)
HCT VFR BLD AUTO: 27.9 % (ref 34.8–46.1)
HGB BLD-MCNC: 8 G/DL (ref 11.5–15.4)
IMM GRANULOCYTES # BLD AUTO: 0.03 THOUSAND/UL (ref 0–0.2)
IMM GRANULOCYTES NFR BLD AUTO: 0 % (ref 0–2)
LYMPHOCYTES # BLD AUTO: 1.93 THOUSANDS/ΜL (ref 0.6–4.47)
LYMPHOCYTES NFR BLD AUTO: 24 % (ref 14–44)
MCH RBC QN AUTO: 22.5 PG (ref 26.8–34.3)
MCHC RBC AUTO-ENTMCNC: 28.7 G/DL (ref 31.4–37.4)
MCV RBC AUTO: 78 FL (ref 82–98)
MONOCYTES # BLD AUTO: 0.5 THOUSAND/ΜL (ref 0.17–1.22)
MONOCYTES NFR BLD AUTO: 6 % (ref 4–12)
NEUTROPHILS # BLD AUTO: 5.5 THOUSANDS/ΜL (ref 1.85–7.62)
NEUTS SEG NFR BLD AUTO: 68 % (ref 43–75)
NRBC BLD AUTO-RTO: 0 /100 WBCS
PLATELET # BLD AUTO: 309 THOUSANDS/UL (ref 149–390)
PMV BLD AUTO: 9.9 FL (ref 8.9–12.7)
POTASSIUM SERPL-SCNC: 4.2 MMOL/L (ref 3.5–5.3)
RBC # BLD AUTO: 3.56 MILLION/UL (ref 3.81–5.12)
SODIUM SERPL-SCNC: 139 MMOL/L (ref 136–145)
WBC # BLD AUTO: 8.07 THOUSAND/UL (ref 4.31–10.16)

## 2021-09-16 PROCEDURE — 36415 COLL VENOUS BLD VENIPUNCTURE: CPT | Performed by: EMERGENCY MEDICINE

## 2021-09-16 PROCEDURE — 80048 BASIC METABOLIC PNL TOTAL CA: CPT | Performed by: EMERGENCY MEDICINE

## 2021-09-16 PROCEDURE — 85025 COMPLETE CBC W/AUTO DIFF WBC: CPT | Performed by: EMERGENCY MEDICINE

## 2021-09-16 PROCEDURE — 71260 CT THORAX DX C+: CPT

## 2021-09-16 PROCEDURE — 96360 HYDRATION IV INFUSION INIT: CPT

## 2021-09-16 PROCEDURE — 99284 EMERGENCY DEPT VISIT MOD MDM: CPT | Performed by: EMERGENCY MEDICINE

## 2021-09-16 PROCEDURE — 99284 EMERGENCY DEPT VISIT MOD MDM: CPT

## 2021-09-16 RX ADMIN — SODIUM CHLORIDE 1000 ML: 0.9 INJECTION, SOLUTION INTRAVENOUS at 12:38

## 2021-09-16 RX ADMIN — IOHEXOL 85 ML: 350 INJECTION, SOLUTION INTRAVENOUS at 13:13

## 2021-09-16 NOTE — ED PROVIDER NOTES
Final Diagnosis:  1  Chest wall discomfort    2  Problem with vascular access        Chief Complaint   Patient presents with    Vascular Access Problem     pt c/o itching and redness at port site since last week  pt states she started yesterday with pain at site  pt was advised by Dr Marlin Santana to be evaluated     HPI  Patient presents for evaluation of chest wall poor  Patient states that she has had her right-sided chest wall port for over a year and that she does home infusions  Over the past couple of days she 1st started with some erythema and pruritus around the site  She then contacted her oncologist who is concerned about a possible infection and started her on antibiotics  She has been taking these antibiotics but now she is having increased pain around the site as well  She feels her infusions are not running as they have in the past as well  Given these concerns she then contacted her surgeon's office who instructed her to come in for further evaluation  Patient denies any fevers  She does endorse nausea without vomiting as well as diarrhea without constipation  No dysuria hematuria  No vaginal discharge  Patient has history gastric bypass, cholecystectomy, appendectomy  Unless otherwise specified:  - No language barrier    - History obtained from patient  - There are no limitations to the history obtained  - Previous charting was reviewed    PMH:   has a past medical history of H  pylori infection, Hypokalemia, Left lower quadrant abdominal pain (2020), Migraine, Renal disorder, and Rhabdomyolysis  PSH:   has a past surgical history that includes Hysterectomy; Appendectomy; Cholecystectomy; Gastric bypass; Abdominal surgery; FL guided central venous access device insertion (2018); Tunneled venous port placement (N/A, 2018);  section;  Cosmetic surgery; and LAPAROSCOPY        ROS:  Review of Systems   -   - 13 point ROS was performed and all are normal unless stated in the history above  - Nursing note reviewed  Vitals reviewed  - Orders placed by myself and/or advanced practitioner / resident  PE:   Vitals:    09/16/21 1115 09/16/21 1117   BP: 113/60    BP Location: Left arm    Pulse: 96    Resp: 16    Temp:  97 7 °F (36 5 °C)   TempSrc:  Tympanic   SpO2: 100%      Vitals reviewed by me  Patient appears well nondistressed sitting in the room  Right sided chest wall port without obvious signs of infection  There is a very faint erythema extending approximately 2-3 cm, irregularly, round port  Patient complains of some tenderness with palpation of this partially radiating to her right axilla  There is no fluctuance  Unless otherwise specified above:    General: VS reviewed  Appears in NAD    Head: Normocephalic, atraumatic  Eyes: EOM-I  No exudate  ENT: Atraumatic external nose and ears  No malocclusion  No stridor  No drooling  Neck: No JVD  CV: No pallor noted  Lungs:   No tachypnea  No respiratory distress    Abdomen:  Soft, non-tender, non-distended    MSK:   FROM spontaneously  No obvious deformity    Skin: Dry, intact  No obvious rash  Neuro: Awake, alert, GCS15, CN II-XII grossly intact  Speaking in full sentences  Motor grossly intact  Psychiatric/Behavioral: Appropriate mood and affect   Exam: deferred    Physical Exam     A:  - Nursing note reviewed  CT chest with contrast   Final Result   1  Accessed right chest wall central venous infusion port without evident complications  Minimal overlying skin thickening  2   Dilated loops of bowel in the left upper quadrant, partially visualized, suggesting possibility of downstream obstruction or colonic ileus  3   Solid and ground glass pulmonary nodules measuring up to 4 mm in the right lung    Based on current Fleischner Society 2017 Guidelines on incidental pulmonary nodule, no routine follow-up is needed if the patient is considered low risk for lung    cancer  If the patient is considered high risk for lung cancer, 12 month follow-up non-contrast chest CT is recommended  The study was marked in [facility] for immediate notification        Workstation performed: [id number]           Orders Placed This Encounter   Procedures    CT chest with contrast    CBC and differential    Basic metabolic panel    Insert peripheral IV     Labs Reviewed   CBC AND DIFFERENTIAL - Abnormal       Result Value Ref Range Status    WBC 8 07  4 31 - 10 16 Thousand/uL Final    RBC 3 56 (*) 3 81 - 5 12 Million/uL Final    Hemoglobin 8 0 (*) 11 5 - 15 4 g/dL Final    Hematocrit 27 9 (*) 34 8 - 46 1 % Final    MCV 78 (*) 82 - 98 fL Final    MCH 22 5 (*) 26 8 - 34 3 pg Final    MCHC 28 7 (*) 31 4 - 37 4 g/dL Final    RDW 16 6 (*) 11 6 - 15 1 % Final    MPV 9 9  8 9 - 12 7 fL Final    Platelets 158  076 - 390 Thousands/uL Final    nRBC 0  /100 WBCs Final    Neutrophils Relative 68  43 - 75 % Final    Immat GRANS % 0  0 - 2 % Final    Lymphocytes Relative 24  14 - 44 % Final    Monocytes Relative 6  4 - 12 % Final    Eosinophils Relative 1  0 - 6 % Final    Basophils Relative 1  0 - 1 % Final    Neutrophils Absolute 5 50  1 85 - 7 62 Thousands/µL Final    Immature Grans Absolute 0 03  0 00 - 0 20 Thousand/uL Final    Lymphocytes Absolute 1 93  0 60 - 4 47 Thousands/µL Final    Monocytes Absolute 0 50  0 17 - 1 22 Thousand/µL Final    Eosinophils Absolute 0 05  0 00 - 0 61 Thousand/µL Final    Basophils Absolute 0 06  0 00 - 0 10 Thousands/µL Final   BASIC METABOLIC PANEL - Abnormal    Sodium 139  136 - 145 mmol/L Final    Potassium 4 2  3 5 - 5 3 mmol/L Final    Chloride 108  100 - 108 mmol/L Final    CO2 21  21 - 32 mmol/L Final    ANION GAP 10  4 - 13 mmol/L Final    BUN 9  5 - 25 mg/dL Final    Creatinine 0 84  0 60 - 1 30 mg/dL Final    Comment: Standardized to IDMS reference method    Glucose 78  65 - 140 mg/dL Final    Comment: If the patient is fasting, the ADA then defines impaired fasting glucose as > 100 mg/dL and diabetes as > or equal to 123 mg/dL  Specimen collection should occur prior to Sulfasalazine administration due to the potential for falsely depressed results  Specimen collection should occur prior to Sulfapyridine administration due to the potential for falsely elevated results  Calcium 7 9 (*) 8 3 - 10 1 mg/dL Final    eGFR 89  ml/min/1 73sq m Final    Narrative:     [place] guidelines for Chronic Kidney Disease (CKD):     Stage 1 with normal or high GFR (GFR > 90 mL/min/1 73 square meters)    Stage 2 Mild CKD (GFR = 60-89 mL/min/1 73 square meters)    Stage 3A Moderate CKD (GFR = 45-59 mL/min/1 73 square meters)    Stage 3B Moderate CKD (GFR = 30-44 mL/min/1 73 square meters)    Stage 4 Severe CKD (GFR = 15-29 mL/min/1 73 square meters)    Stage 5 End Stage CKD (GFR <15 mL/min/1 73 square meters)  Note: GFR calculation is accurate only with a steady state creatinine         Final Diagnosis:  1  Chest wall discomfort    2  Problem with vascular access        P:  - laboratory analysis was overall unremarkable  CT scan chest failed to the show acute pathology related to the floor  Did note colonic distension which is chronic for the patient secondary to 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  Discussed the results with the patient  No evidence of acute infection at this time  She currently has close follow-up with surgery in the next couple days  Return precautions discussed      Medications   sodium chloride 0 9 % bolus 1,000 mL (1,000 mL Intravenous New Bag 9/16/21 1238)   iohexol (OMNIPAQUE) 350 MG/ML injection (SINGLE-DOSE) 85 mL (85 mL Intravenous Given 9/16/21 1313)     Time reflects when diagnosis was documented in both MDM as applicable and the Disposition within this note     Time User Action Codes Description Comment    9/16/2021  2:25 PM Yazmin Or Add [R07 89] Chest wall discomfort 9/16/2021  2:25 PM Sheridan Neumann Add [Z78 9] Problem with vascular access       ED Disposition     ED Disposition Condition Date/Time Comment    Discharge Stable Thu Sep 16, 2021  2:25 PM Chico Saeed discharge to home/self care  Follow-up Information     Follow up With Specialties Details Why [address] Jake Babb IV, MD [facility]   [address]  [address] [address] [facility] [address]  [phone]      Gustavo Zepeda DO General Surgery, Wound Care Schedule an appointment as soon as possible for a visit   [address] [place] [address]  [phone]          Patient's Medications   Discharge Prescriptions    No medications on file     No discharge procedures on file  Prior to Admission Medications   Prescriptions Last Dose Informant Patient Reported? Taking? AMILoride 5 mg tablet  Self No No   Sig: Take 1 tablet (5 mg total) by mouth daily   B Complex Vitamins (VITAMIN B COMPLEX PO)   Yes No   Sig: Take 1 capsule by mouth   Catheters MISC   No No   Sig: by Does not apply route 2 (two) times a week Port care per [employer], Disp, (SYRINGE 3CC/25GX5/8") 25G X 5/8" 3 ML MISC   No No   Sig: Use once monthly for B12 injection     cephalexin (KEFLEX) 250 mg capsule   No No   Sig: Take 2 capsules (500 mg total) by mouth 4 (four) times a day for 14 days   cyanocobalamin 1,000 mcg/mL   No No   Sig: Inject 1 mL (1,000 mcg total) into a muscle every 30 (thirty) days   dicyclomine (BENTYL) 10 mg capsule   No No   Sig: Take 1 capsule (10 mg total) by mouth 3 (three) times a day before meals for 14 days   ergocalciferol (ERGOCALCIFEROL) 1 25 MG (37115 UT) capsule   No No   Sig: Take 1 capsule (50,000 Units total) by mouth 2 (two) times a week Patient takes this med on Mondays   Patient taking differently: Take 50,000 Units by mouth once a week Mondays and Thursdays   escitalopram (LEXAPRO) 10 mg tablet  Self Yes No   Sig: Take 20 mg by mouth daily at bedtime    ferrous sulfate 325 (65 Fe) mg tablet   Yes No   Sig: Take 325 mg by mouth every other day Last took 3/9/20   ondansetron (ZOFRAN) 4 mg tablet   No No   Sig: Take 1 tablet (4 mg total) by mouth every 6 (six) hours   potassium chloride 0 4 mEq/mL   No No   Sig: Infuse 200 mL (80 mEq total) into a venous catheter daily 80 meq in 1 litre bag to be infused daily over 8 hours   traMADol (ULTRAM) 50 mg tablet   No No   Sig: Take 1 tablet (50 mg total) by mouth every 6 (six) hours as needed for severe pain for up to 12 doses      Facility-Administered Medications: None       Portions of the record may have been created with voice recognition software  Occasional wrong word or "sound a like" substitutions may have occurred due to the inherent limitations of voice recognition software  Read the chart carefully and recognize, using context, where substitutions have occurred      Electronically signed by:  MD Vish River Che, MD  09/16/21 [id number]

## 2021-09-16 NOTE — TELEPHONE ENCOUNTER
Pt called having a lot of pain and redness at port site was on abx since last Friday, no extremely painful, advised to go to ER for possible imaging per Dr Cathy Moulton made for Monday   Pt agreeable

## 2021-09-17 ENCOUNTER — OFFICE VISIT (OUTPATIENT)
Dept: NEPHROLOGY | Facility: CLINIC | Age: 36
End: 2021-09-17
Payer: COMMERCIAL

## 2021-09-17 VITALS
HEART RATE: 104 BPM | BODY MASS INDEX: 23.21 KG/M2 | DIASTOLIC BLOOD PRESSURE: 78 MMHG | SYSTOLIC BLOOD PRESSURE: 108 MMHG | HEIGHT: 63 IN | WEIGHT: 131 LBS

## 2021-09-17 DIAGNOSIS — E55.9 VITAMIN D INSUFFICIENCY: ICD-10-CM

## 2021-09-17 DIAGNOSIS — E87.6 HYPOKALEMIA: Primary | ICD-10-CM

## 2021-09-17 DIAGNOSIS — Z98.84 HISTORY OF GASTRIC BYPASS: ICD-10-CM

## 2021-09-17 DIAGNOSIS — L03.313 CELLULITIS OF CHEST WALL: ICD-10-CM

## 2021-09-17 DIAGNOSIS — D64.9 ANEMIA, UNSPECIFIED TYPE: ICD-10-CM

## 2021-09-17 DIAGNOSIS — K86.1 CHRONIC PANCREATITIS, UNSPECIFIED PANCREATITIS TYPE (HCC): ICD-10-CM

## 2021-09-17 DIAGNOSIS — E53.8 VITAMIN B12 DEFICIENCY: ICD-10-CM

## 2021-09-17 PROCEDURE — 99214 OFFICE O/P EST MOD 30 MIN: CPT | Performed by: INTERNAL MEDICINE

## 2021-09-17 RX ORDER — ERGOCALCIFEROL (VITAMIN D2) 1250 MCG
50000 CAPSULE ORAL WEEKLY
Start: 2021-09-17

## 2021-09-17 NOTE — PROGRESS NOTES
[facility] [place], [place]    Name: Stuart Florian  YOB: 1985      Assessment/Plan:    Hypokalemia  Potassium levels are appropriate, continue with in-home infusions of potassium chloride intravenously currently dosed at 80 mEq once daily  Cellulitis of chest wall  Patient is finishing course of Keflex 500 mg 4 times a day  Thankfully, CT scan of the chest did not reveal an abscess  She will be seeing her surgeon on Monday, September 20th  Chronic pancreatitis ([facility] [address] )  Patient appears to be improving, she is on Creon with meals  Anemia  Will assist the patient's B12 as well as iron along with a repeat CBC next week to confirm that hemoglobin is stable  Last check it was at 8 5 g/dL  Problem List Items Addressed This Visit        Digestive    Chronic pancreatitis [facility])     Patient appears to be improving, she is on Creon with meals  Other    Hypokalemia - Primary     Potassium levels are appropriate, continue with in-home infusions of potassium chloride intravenously currently dosed at 80 mEq once daily  History of gastric bypass    Anemia     Will assist the patient's B12 as well as iron along with a repeat CBC next week to confirm that hemoglobin is stable  Last check it was at 8 5 g/dL  Relevant Orders    Iron Panel (Includes Ferritin, Iron Sat%, Iron, and TIBC)    CBC and differential    Vitamin D insufficiency    Relevant Medications    ergocalciferol (ERGOCALCIFEROL) 1 25 MG (28529 UT) capsule    Vitamin B12 deficiency    Relevant Orders    Vitamin B12    Cellulitis of chest wall     Patient is finishing course of Keflex 500 mg 4 times a day  Thankfully, CT scan of the chest did not reveal an abscess  She will be seeing her surgeon on Monday, September 20th  Patient is stable at this time, however there is a significant concern cellulitis in the area of the Port-A-Cath    Patient will be seeing her surgeon next week, September 20th  From the renal standpoint, will continue with potassium infusions in the in home setting at 80 mEq once daily  Continue with high-dose vitamin-D supplementation  We will see the patient back for regular appointment approximately 6 months  Blood work next week to confirm hemoglobin is appropriate, also check iron and B12 at that time  Subjective:      Patient ID: Yassine Lozano is a 39 y o  female  Patient presents for follow-up  Since we last saw the patient she has been mostly well controlled from the electrolyte standpoint  Unfortunately she has developed pancreatitis several times since our last visit  She is currently on Creon which appears to be helping her symptoms  In addition development since we last saw the patient includes erythema in the area of the Port-A-Cath  There was concern for infection she was placed on Keflex by myself  The patient's symptoms have not worsened but they have not improved either  She presented to the emergency department where she had a CT scan with contrast to further evaluate, thankfully there were no areas of abscess or other abnormalities noted on CT scan  I also reviewed the imaging myself, patient was also present and saw the images as well, no identifiable issues were noted  The following portions of the patient's history were reviewed and updated as appropriate: allergies, current medications, past family history, past medical history, past social history, past surgical history and problem list     Review of Systems   All other systems reviewed and are negative          Social History     Socioeconomic History    Marital status: /Civil Union     Spouse name: None    Number of children: None    Years of education: None    Highest education level: None   Occupational History    None   Tobacco Use    Smoking status: Never Smoker    Smokeless tobacco: Never Used   Vaping Use    Vaping Use: Never used   Substance and Sexual Activity    Alcohol use: Never     Alcohol/week: 0 0 standard drinks     Comment: 0    Drug use: Never    Sexual activity: Yes     Partners: Male   Other Topics Concern    None   Social History Narrative    Rarely consumes alcohol - As per Grzegorz Bird      Social Determinants of Health     Financial Resource Strain:     Difficulty of Paying Living Expenses:    Food Insecurity:     Worried About Running Out of Food in the Last Year:     [address] in the Last Year:    Transportation Needs:     Lack of Transportation (Medical):  Lack of Transportation (Non-Medical):    Physical Activity:     Days of Exercise per Week:     Minutes of Exercise per Session:    Stress:     Feeling of Stress :    Social Connections:     Frequency of Communication with Friends and Family:     Frequency of Social Gatherings with Friends and Family:     Attends Yarsani Services:     Active Member of Clubs or Organizations:     Attends Club or Organization Meetings:     Marital Status:    Intimate Partner Violence:     Fear of Current or Ex-Partner:     Emotionally Abused:     Physically Abused:     Sexually Abused:      Past Medical History:   Diagnosis Date    H  pylori infection     Hypokalemia     Left lower quadrant abdominal pain 2020    Migraine     Renal disorder     Rhabdomyolysis      Past Surgical History:   Procedure Laterality Date    ABDOMINAL SURGERY      APPENDECTOMY       SECTION      CHOLECYSTECTOMY      COSMETIC SURGERY      "tummy tuck"    FL [address] [facility] [address] [facility]  2018    GASTRIC BYPASS      HYSTERECTOMY      LAPAROSCOPY      TUNNELED VENOUS PORT PLACEMENT N/A 2018    Procedure: INSERTION VENOUS PORT (PORT-A-CATH);   Surgeon: Lex Molina DO;  Location: [facility] MA[facility] OR;  Service: General       Current Outpatient Medications:     AMILoride 5 mg tablet, Take 1 tablet (5 mg total) by mouth daily, Disp: 30 tablet, Rfl: 0    B Complex Vitamins (VITAMIN B COMPLEX PO), Take 1 capsule by mouth, Disp: , Rfl:     Catheters MISC, by Does not apply route 2 (two) times a week Port care per [employer] protocol, Disp: 999 each, Rfl: 11    cephalexin (KEFLEX) 250 mg capsule, Take 2 capsules (500 mg total) by mouth 4 (four) times a day for 14 days, Disp: 112 capsule, Rfl: 0    cyanocobalamin 1,000 mcg/mL, Inject 1 mL (1,000 mcg total) into a muscle every 30 (thirty) days, Disp: 1 mL, Rfl: 12    ergocalciferol (ERGOCALCIFEROL) 1 25 MG (50493 UT) capsule, Take 1 capsule (50,000 Units total) by mouth once a week Mondays and Thursdays, Disp: , Rfl:     escitalopram (LEXAPRO) 10 mg tablet, Take 20 mg by mouth daily at bedtime , Disp: , Rfl:     ferrous sulfate 325 (65 Fe) mg tablet, Take 325 mg by mouth every other day Last took 3/9/20, Disp: , Rfl:     potassium chloride 0 4 mEq/mL, Infuse 200 mL (80 mEq total) into a venous catheter daily 80 meq in 1 litre bag to be infused daily over 8 hours, Disp: 2800 mL, Rfl: 12    Syringe/Needle, Disp, (SYRINGE 3CC/25GX5/8") 25G X 5/8" 3 ML MISC, Use once monthly for B12 injection  , Disp: 1 each, Rfl: 11    dicyclomine (BENTYL) 10 mg capsule, Take 1 capsule (10 mg total) by mouth 3 (three) times a day before meals for 14 days, Disp: 42 capsule, Rfl: 0    Lab Results   Component Value Date    SODIUM 139 09/16/2021    K 4 2 09/16/2021     09/16/2021    CO2 21 09/16/2021    AGAP 10 09/16/2021    BUN 9 09/16/2021    CREATININE 0 84 09/16/2021    GLUC 78 09/16/2021    GLUF 97 08/16/2021    CALCIUM 7 9 (L) 09/16/2021    AST 23 08/14/2021    ALT 36 08/14/2021    ALKPHOS 48 08/14/2021    TP 7 7 08/14/2021    TBILI 0 21 08/14/2021    EGFR 89 09/16/2021     Lab Results   Component Value Date    WBC 8 07 09/16/2021    HGB 8 0 (L) 09/16/2021    HCT 27 9 (L) 09/16/2021    MCV 78 (L) 09/16/2021     09/16/2021     Lab Results   Component Value Date    CHOLESTEROL 161 02/19/2021     Lab Results   Component Value Date    HDL 70 02/19/2021     Lab Results   Component Value Date    LDLCALC 70 02/19/2021     Lab Results   Component Value Date    TRIG 85 06/18/2021    TRIG 107 02/19/2021     No results found for: [place], [place]  Lab Results   Component Value Date    KVD3XKWPQGZV 1 119 02/26/2020     Lab Results   Component Value Date    CALCIUM 7 9 (L) 09/16/2021    PHOS 4 5 02/20/2021     Lab Results   Component Value Date    SPEP  07/11/2018     The serum total protein and albumin are decreased  The serum protein electrophoresis shows a decreased beta-1 region  No monoclonal bands noted  Reviewed by: Chastity Moss MD  ([id number])  **Electronic Signature**    UPEP  07/12/2018     The urine total protein and electrophoresis are within normal limits  No monoclonal bands noted  Reviewed by: Chastity Moss MD ([id number])  **Electronic Signature**     No results found for: JONNATHANRIAN [id number]        Objective:      /78 (BP Location: Left arm, Patient Position: Sitting)   Pulse 104   Ht 5' 3" (1 6 m)   Wt 59 4 kg (131 lb)   LMP  (LMP Unknown)   BMI 23 21 kg/m²          Physical Exam  Vitals reviewed  Constitutional:       General: She is not in acute distress  Appearance: She is well-developed  HENT:      Head: Normocephalic and atraumatic  Eyes:      Conjunctiva/sclera: Conjunctivae normal    Cardiovascular:      Rate and Rhythm: Normal rate and regular rhythm  Pulmonary:      Effort: Pulmonary effort is normal       Breath sounds: Normal breath sounds  Abdominal:      Palpations: Abdomen is soft  Musculoskeletal:      Cervical back: Neck supple  Skin:     General: Skin is warm  Findings: No rash  Neurological:      Mental Status: She is alert and oriented to person, place, and time  Cranial Nerves: No cranial nerve deficit     Psychiatric:         Behavior: Behavior normal

## 2021-09-17 NOTE — ASSESSMENT & PLAN NOTE
Patient is finishing course of Keflex 500 mg 4 times a day  Thankfully, CT scan of the chest did not reveal an abscess  She will be seeing her surgeon on Monday, September 20th

## 2021-09-17 NOTE — ASSESSMENT & PLAN NOTE
Potassium levels are appropriate, continue with in-home infusions of potassium chloride intravenously currently dosed at 80 mEq once daily

## 2021-09-17 NOTE — ASSESSMENT & PLAN NOTE
Will assist the patient's B12 as well as iron along with a repeat CBC next week to confirm that hemoglobin is stable  Last check it was at 8 5 g/dL

## 2021-09-20 ENCOUNTER — OFFICE VISIT (OUTPATIENT)
Dept: SURGERY | Facility: CLINIC | Age: 36
End: 2021-09-20
Payer: COMMERCIAL

## 2021-09-20 VITALS
SYSTOLIC BLOOD PRESSURE: 105 MMHG | DIASTOLIC BLOOD PRESSURE: 68 MMHG | BODY MASS INDEX: 23.21 KG/M2 | HEIGHT: 63 IN | TEMPERATURE: 98.8 F | WEIGHT: 131 LBS | HEART RATE: 96 BPM

## 2021-09-20 DIAGNOSIS — E87.6 HYPOKALEMIA: Primary | ICD-10-CM

## 2021-09-20 DIAGNOSIS — T14.8XXA OPEN WOUND: ICD-10-CM

## 2021-09-20 PROCEDURE — 99213 OFFICE O/P EST LOW 20 MIN: CPT | Performed by: SURGERY

## 2021-09-20 NOTE — PROGRESS NOTES
Assessment/Plan:    Open wound / skin tear of right chest wall  1 5 x 1 cm skin tear of right chest wall near port access site  No erythema  No significant drainage  Nothing to suggest infection  Small amount of slough  No additional antibiotics needed at this time  Patient would benefit from local wound care  For now we will do Maxorb Ag with DuoDerm be changed every 7 days  Follow-up 1 week  The actual port site itself from where the fusion needle was removed appeared healthy  Nontender  No significant cellulitis  There is some chronic discoloration likely which is frequent access of the site  No evidence of infection  No drainage  Noted skin thickening on CT scan likely due to chronic access with infusion needle    Hypokalemia  Continue current potassium infusion as directed by nephrologist   Patient can still access report as needed even with the wound/skin tear of the port site  Diagnoses and all orders for this visit:    Hypokalemia    Open wound          Subjective:      Patient ID: Agueda Elizondo is a 39 y o  female  14-year-old female with known multiple comorbidities including chronic hypokalemia requiring daily infusion of potassium presents today for follow-up regarding potential wound versus infection of the right-sided port site  The patient states for some time now she has had the small area of redness near the port where it is accessed daily for potassium infusion  She states she has been treated with multiple antibiotics without any significant improvement  Denies any significant pain  Just complains of severe itching  No significant drainage  No nausea vomiting  No fevers or chills  Continues to get her potassium infusions without any complication        The following portions of the patient's history were reviewed and updated as appropriate:   She  has a past medical history of H  pylori infection, Hypokalemia, Left lower quadrant abdominal pain (8/17/2020), Migraine, Renal disorder, and Rhabdomyolysis  She   Patient Active Problem List    Diagnosis Date Noted    Open wound / skin tear of right chest wall 2021    Cellulitis of chest wall 2021    Chronic pancreatitis ([facility] [address] ) 2021    Acute on chronic abdominal pain 2021    Blood creatinine increased compared with prior measurement 2021    Gas pain 2021    Left lower quadrant abdominal pain 2020    Middle ear effusion 2020    Normal anion gap metabolic acidosis     Nausea & vomiting 2019    Vertigo 2019    Stress fracture of right foot with routine healing 2019    Right ovarian cyst 2019    Chest pain 2019    Other intestinal malabsorption 10/05/2018    Gastric bypass status for obesity 10/02/2018    GERD (gastroesophageal reflux disease) 2018    Paresthesia of both lower extremities 08/15/2018    Paresthesias 08/15/2018    Fatigue 2018    Elevated CPK 2018    Vitamin B12 deficiency 2018    Cervical lymphadenopathy 2018    Anemia 07/10/2018    Vitamin D insufficiency 07/10/2018    Hypophosphatemia 2018    Hypokalemia 2018    History of gastric bypass 2018    Migraine without aura and without status migrainosus, not intractable 2018    Left-sided weakness 2018    B12 deficiency 2016    WAGNER (obstructive sleep apnea) 2014    Migraine 2014     She  has a past surgical history that includes Hysterectomy; Appendectomy; Cholecystectomy; Gastric bypass; Abdominal surgery; FL guided central venous access device insertion (2018); Tunneled venous port placement (N/A, 2018);  section; Cosmetic surgery; and LAPAROSCOPY  Her family history includes Diabetes in her father and maternal grandfather; Hypertension in her father; No Known Problems in her mother  She  reports that she has never smoked   She has never used smokeless tobacco  She reports that she does not drink alcohol and does not use drugs  Current Outpatient Medications   Medication Sig Dispense Refill    AMILoride 5 mg tablet Take 1 tablet (5 mg total) by mouth daily 30 tablet 0    B Complex Vitamins (VITAMIN B COMPLEX PO) Take 1 capsule by mouth      Catheters MISC by Does not apply route 2 (two) times a week Port care per [place] protocol 999 each 11    cephalexin (KEFLEX) 250 mg capsule Take 2 capsules (500 mg total) by mouth 4 (four) times a day for 14 days 112 capsule 0    cyanocobalamin 1,000 mcg/mL Inject 1 mL (1,000 mcg total) into a muscle every 30 (thirty) days 1 mL 12    ergocalciferol (ERGOCALCIFEROL) 1 25 MG (75964 UT) capsule Take 1 capsule (50,000 Units total) by mouth once a week Mondays and Thursdays      escitalopram (LEXAPRO) 10 mg tablet Take 20 mg by mouth daily at bedtime       ferrous sulfate 325 (65 Fe) mg tablet Take 325 mg by mouth every other day Last took 3/9/20      potassium chloride 0 4 mEq/mL Infuse 200 mL (80 mEq total) into a venous catheter daily 80 meq in 1 litre bag to be infused daily over 8 hours 2800 mL 12    Syringe/Needle, Disp, (SYRINGE 3CC/25GX5/8") 25G X 5/8" 3 ML MISC Use once monthly for B12 injection  1 each 11    dicyclomine (BENTYL) 10 mg capsule Take 1 capsule (10 mg total) by mouth 3 (three) times a day before meals for 14 days 42 capsule 0     No current facility-administered medications for this visit  She is allergic to nsaids and prednisone       Review of Systems      Review systems completed, all negative except as noted above HPI  Objective:      /68   Pulse 96   Temp 98 8 °F (37 1 °C)   Ht 5' 3" (1 6 m)   Wt 59 4 kg (131 lb)   LMP  (LMP Unknown)   BMI 23 21 kg/m²          Physical Exam  Vitals reviewed  Constitutional:       General: She is not in acute distress  Appearance: Normal appearance  She is normal weight  She is not ill-appearing, toxic-appearing or diaphoretic  HENT:      Head: Normocephalic and atraumatic  Right Ear: External ear normal       Left Ear: External ear normal    Eyes:      General: No scleral icterus  Right eye: No discharge  Left eye: No discharge  Cardiovascular:      Rate and Rhythm: Normal rate  Pulmonary:      Effort: Pulmonary effort is normal  No respiratory distress  Musculoskeletal:         General: No tenderness or signs of injury  Normal range of motion  Cervical back: Normal range of motion and neck supple  Right lower leg: No edema  Left lower leg: No edema  Skin:     General: Skin is warm  Coloration: Skin is not jaundiced or pale  Findings: No bruising or erythema  Comments: Skin tear of the right chest wall just lateral to the port access site  Skin tear measured approximately 1 5 x 1 cm  No erythema  No significant drainage  Mild slough noted   Neurological:      General: No focal deficit present  Mental Status: She is alert and oriented to person, place, and time  Cranial Nerves: No cranial nerve deficit  Psychiatric:         Mood and Affect: Mood normal          Thought Content: Thought content normal          Judgment: Judgment normal            Note:     Office note from Nephrology dated September 17, 2021 was personally reviewed by me  In addition recent ER notes dated September 16, 2021 were also personally reviewed by me  Imaging:     CT scan of the chest images dated September 16, 2021 were personally viewed by me

## 2021-09-20 NOTE — ASSESSMENT & PLAN NOTE
Continue current potassium infusion as directed by nephrologist   Patient can still access report as needed even with the wound/skin tear of the port site

## 2021-09-20 NOTE — ASSESSMENT & PLAN NOTE
1 5 x 1 cm skin tear of right chest wall near port access site  No erythema  No significant drainage  Nothing to suggest infection  Small amount of slough  No additional antibiotics needed at this time  Patient would benefit from local wound care  For now we will do Maxorb Ag with DuoDerm be changed every 7 days  Follow-up 1 week  The actual port site itself from where the fusion needle was removed appeared healthy  Nontender  No significant cellulitis  There is some chronic discoloration likely which is frequent access of the site  No evidence of infection  No drainage    Noted skin thickening on CT scan likely due to chronic access with infusion needle

## 2021-09-21 ENCOUNTER — TELEPHONE (OUTPATIENT)
Dept: NEPHROLOGY | Facility: CLINIC | Age: 36
End: 2021-09-21

## 2021-09-21 NOTE — TELEPHONE ENCOUNTER
Mack Rojo from [facility] is calling in requesting an order to continue potassium infusions be faxed to [phone]  She said the order must state "  Ok to re-access patient after her most recent infection"  Please review ER visit and visit with [facility] from yesterday

## 2021-09-22 ENCOUNTER — APPOINTMENT (OUTPATIENT)
Dept: LAB | Facility: HOSPITAL | Age: 36
End: 2021-09-22
Attending: INTERNAL MEDICINE
Payer: COMMERCIAL

## 2021-09-22 ENCOUNTER — ANESTHESIA EVENT (OUTPATIENT)
Dept: PERIOP | Facility: HOSPITAL | Age: 36
End: 2021-09-22
Payer: COMMERCIAL

## 2021-09-22 DIAGNOSIS — E53.8 VITAMIN B12 DEFICIENCY: ICD-10-CM

## 2021-09-22 DIAGNOSIS — K86.1 CHRONIC PANCREATITIS, UNSPECIFIED PANCREATITIS TYPE (HCC): ICD-10-CM

## 2021-09-22 DIAGNOSIS — D64.9 ANEMIA, UNSPECIFIED TYPE: ICD-10-CM

## 2021-09-22 LAB
BASOPHILS # BLD AUTO: 0.09 THOUSANDS/ΜL (ref 0–0.1)
BASOPHILS NFR BLD AUTO: 1 % (ref 0–1)
EOSINOPHIL # BLD AUTO: 0.04 THOUSAND/ΜL (ref 0–0.61)
EOSINOPHIL NFR BLD AUTO: 1 % (ref 0–6)
ERYTHROCYTE [DISTWIDTH] IN BLOOD BY AUTOMATED COUNT: 16.7 % (ref 11.6–15.1)
FERRITIN SERPL-MCNC: 3 NG/ML (ref 8–388)
HCT VFR BLD AUTO: 33.8 % (ref 34.8–46.1)
HGB BLD-MCNC: 10.3 G/DL (ref 11.5–15.4)
IMM GRANULOCYTES # BLD AUTO: 0.03 THOUSAND/UL (ref 0–0.2)
IMM GRANULOCYTES NFR BLD AUTO: 1 % (ref 0–2)
IRON SATN MFR SERPL: 4 % (ref 15–50)
IRON SERPL-MCNC: 17 UG/DL (ref 50–170)
LIPASE SERPL-CCNC: 310 U/L (ref 73–393)
LYMPHOCYTES # BLD AUTO: 1.81 THOUSANDS/ΜL (ref 0.6–4.47)
LYMPHOCYTES NFR BLD AUTO: 28 % (ref 14–44)
MCH RBC QN AUTO: 22.9 PG (ref 26.8–34.3)
MCHC RBC AUTO-ENTMCNC: 30.5 G/DL (ref 31.4–37.4)
MCV RBC AUTO: 75 FL (ref 82–98)
MONOCYTES # BLD AUTO: 0.38 THOUSAND/ΜL (ref 0.17–1.22)
MONOCYTES NFR BLD AUTO: 6 % (ref 4–12)
NEUTROPHILS # BLD AUTO: 4.09 THOUSANDS/ΜL (ref 1.85–7.62)
NEUTS SEG NFR BLD AUTO: 63 % (ref 43–75)
NRBC BLD AUTO-RTO: 0 /100 WBCS
PLATELET # BLD AUTO: 420 THOUSANDS/UL (ref 149–390)
PMV BLD AUTO: 10.6 FL (ref 8.9–12.7)
RBC # BLD AUTO: 4.5 MILLION/UL (ref 3.81–5.12)
TIBC SERPL-MCNC: 454 UG/DL (ref 250–450)
VIT B12 SERPL-MCNC: 144 PG/ML (ref 100–900)
WBC # BLD AUTO: 6.44 THOUSAND/UL (ref 4.31–10.16)

## 2021-09-22 PROCEDURE — 83550 IRON BINDING TEST: CPT

## 2021-09-22 PROCEDURE — 85025 COMPLETE CBC W/AUTO DIFF WBC: CPT

## 2021-09-22 PROCEDURE — 83540 ASSAY OF IRON: CPT

## 2021-09-22 PROCEDURE — 83690 ASSAY OF LIPASE: CPT

## 2021-09-22 PROCEDURE — 82607 VITAMIN B-12: CPT

## 2021-09-22 PROCEDURE — 82728 ASSAY OF FERRITIN: CPT

## 2021-09-22 PROCEDURE — 36415 COLL VENOUS BLD VENIPUNCTURE: CPT

## 2021-09-22 NOTE — ANESTHESIA PREPROCEDURE EVALUATION
Procedure:  ANAL EXAM UNDER ANESTHESIA; POSSIBLE HEMORRHOIDECTOMY; POSSIBLE LATERAL SPHINCTEROTOMY (N/A Penis)    Relevant Problems   CARDIO   (+) Chest pain      GI/HEPATIC   (+) Chronic pancreatitis (HCC)   (+) GERD (gastroesophageal reflux disease)      HEMATOLOGY   (+) Anemia      NEURO/PSYCH   (+) Paresthesia of both lower extremities   (+) Paresthesias      PULMONARY   (+) WAGNER (obstructive sleep apnea)      Other   (+) Cervical lymphadenopathy      Daily potassium infusion  Physical Exam    Airway    Mallampati score: II  TM Distance: >3 FB  Neck ROM: full     Dental       Cardiovascular      Pulmonary      Other Findings        Anesthesia Plan  ASA Score- 3     Anesthesia Type- general with ASA Monitors  Additional Monitors:   Airway Plan: ETT  Plan Factors-    Induction- intravenous  Postoperative Plan-     Informed Consent- Anesthetic plan and risks discussed with patient  I personally reviewed this patient with the CRNA  Discussed and agreed on the Anesthesia Plan with the CRNA  Elvira Funk

## 2021-09-23 ENCOUNTER — HOSPITAL ENCOUNTER (OUTPATIENT)
Facility: HOSPITAL | Age: 36
Setting detail: OUTPATIENT SURGERY
Discharge: HOME | End: 2021-09-23
Attending: STUDENT IN AN ORGANIZED HEALTH CARE EDUCATION/TRAINING PROGRAM | Admitting: STUDENT IN AN ORGANIZED HEALTH CARE EDUCATION/TRAINING PROGRAM
Payer: COMMERCIAL

## 2021-09-23 ENCOUNTER — ANESTHESIA (OUTPATIENT)
Dept: PERIOP | Facility: HOSPITAL | Age: 36
End: 2021-09-23
Payer: COMMERCIAL

## 2021-09-23 VITALS
HEIGHT: 63 IN | HEART RATE: 82 BPM | OXYGEN SATURATION: 98 % | WEIGHT: 132 LBS | DIASTOLIC BLOOD PRESSURE: 68 MMHG | RESPIRATION RATE: 20 BRPM | BODY MASS INDEX: 23.39 KG/M2 | TEMPERATURE: 98.1 F | SYSTOLIC BLOOD PRESSURE: 119 MMHG

## 2021-09-23 DIAGNOSIS — K64.8 OTHER HEMORRHOIDS: ICD-10-CM

## 2021-09-23 PROCEDURE — 88304 TISSUE EXAM BY PATHOLOGIST: CPT | Performed by: PATHOLOGY

## 2021-09-23 PROCEDURE — 46930 DESTROY INTERNAL HEMORRHOIDS: CPT

## 2021-09-23 RX ORDER — LIDOCAINE HYDROCHLORIDE 10 MG/ML
INJECTION, SOLUTION EPIDURAL; INFILTRATION; INTRACAUDAL; PERINEURAL AS NEEDED
Status: DISCONTINUED | OUTPATIENT
Start: 2021-09-23 | End: 2021-09-23

## 2021-09-23 RX ORDER — ROCURONIUM BROMIDE 10 MG/ML
INJECTION, SOLUTION INTRAVENOUS AS NEEDED
Status: DISCONTINUED | OUTPATIENT
Start: 2021-09-23 | End: 2021-09-23

## 2021-09-23 RX ORDER — MAGNESIUM HYDROXIDE 1200 MG/15ML
LIQUID ORAL AS NEEDED
Status: DISCONTINUED | OUTPATIENT
Start: 2021-09-23 | End: 2021-09-23 | Stop reason: HOSPADM

## 2021-09-23 RX ORDER — ONDANSETRON 2 MG/ML
INJECTION INTRAMUSCULAR; INTRAVENOUS AS NEEDED
Status: DISCONTINUED | OUTPATIENT
Start: 2021-09-23 | End: 2021-09-23

## 2021-09-23 RX ORDER — MIDAZOLAM HYDROCHLORIDE 2 MG/2ML
INJECTION, SOLUTION INTRAMUSCULAR; INTRAVENOUS AS NEEDED
Status: DISCONTINUED | OUTPATIENT
Start: 2021-09-23 | End: 2021-09-23

## 2021-09-23 RX ORDER — NEOSTIGMINE METHYLSULFATE 1 MG/ML
INJECTION INTRAVENOUS AS NEEDED
Status: DISCONTINUED | OUTPATIENT
Start: 2021-09-23 | End: 2021-09-23

## 2021-09-23 RX ORDER — FENTANYL CITRATE/PF 50 MCG/ML
50 SYRINGE (ML) INJECTION
Status: DISCONTINUED | OUTPATIENT
Start: 2021-09-23 | End: 2021-09-23 | Stop reason: HOSPADM

## 2021-09-23 RX ORDER — PROPOFOL 10 MG/ML
INJECTION, EMULSION INTRAVENOUS AS NEEDED
Status: DISCONTINUED | OUTPATIENT
Start: 2021-09-23 | End: 2021-09-23

## 2021-09-23 RX ORDER — LIDOCAINE 50 MG/G
OINTMENT TOPICAL AS NEEDED
Status: DISCONTINUED | OUTPATIENT
Start: 2021-09-23 | End: 2021-09-23 | Stop reason: HOSPADM

## 2021-09-23 RX ORDER — OXYCODONE HYDROCHLORIDE 5 MG/1
5 TABLET ORAL EVERY 4 HOURS PRN
Qty: 10 TABLET | Refills: 0 | Status: SHIPPED | OUTPATIENT
Start: 2021-09-23 | End: 2021-10-03

## 2021-09-23 RX ORDER — BUPIVACAINE HYDROCHLORIDE AND EPINEPHRINE 2.5; 5 MG/ML; UG/ML
INJECTION, SOLUTION INFILTRATION; PERINEURAL AS NEEDED
Status: DISCONTINUED | OUTPATIENT
Start: 2021-09-23 | End: 2021-09-23 | Stop reason: HOSPADM

## 2021-09-23 RX ORDER — GLYCOPYRROLATE 0.2 MG/ML
INJECTION INTRAMUSCULAR; INTRAVENOUS AS NEEDED
Status: DISCONTINUED | OUTPATIENT
Start: 2021-09-23 | End: 2021-09-23

## 2021-09-23 RX ORDER — FENTANYL CITRATE 50 UG/ML
INJECTION, SOLUTION INTRAMUSCULAR; INTRAVENOUS AS NEEDED
Status: DISCONTINUED | OUTPATIENT
Start: 2021-09-23 | End: 2021-09-23

## 2021-09-23 RX ORDER — SODIUM CHLORIDE, SODIUM LACTATE, POTASSIUM CHLORIDE, CALCIUM CHLORIDE 600; 310; 30; 20 MG/100ML; MG/100ML; MG/100ML; MG/100ML
50 INJECTION, SOLUTION INTRAVENOUS CONTINUOUS
Status: DISCONTINUED | OUTPATIENT
Start: 2021-09-23 | End: 2021-09-23 | Stop reason: HOSPADM

## 2021-09-23 RX ORDER — ONDANSETRON 2 MG/ML
4 INJECTION INTRAMUSCULAR; INTRAVENOUS ONCE AS NEEDED
Status: DISCONTINUED | OUTPATIENT
Start: 2021-09-23 | End: 2021-09-23 | Stop reason: HOSPADM

## 2021-09-23 RX ADMIN — MIDAZOLAM HYDROCHLORIDE 1.5 MG: 1 INJECTION, SOLUTION INTRAMUSCULAR; INTRAVENOUS at 07:27

## 2021-09-23 RX ADMIN — FENTANYL CITRATE 50 MCG: 0.05 INJECTION, SOLUTION INTRAMUSCULAR; INTRAVENOUS at 08:51

## 2021-09-23 RX ADMIN — FENTANYL CITRATE 25 MCG: 50 INJECTION, SOLUTION INTRAMUSCULAR; INTRAVENOUS at 08:27

## 2021-09-23 RX ADMIN — ONDANSETRON 4 MG: 2 INJECTION INTRAMUSCULAR; INTRAVENOUS at 07:47

## 2021-09-23 RX ADMIN — PROPOFOL 200 MG: 10 INJECTION, EMULSION INTRAVENOUS at 07:34

## 2021-09-23 RX ADMIN — NEOSTIGMINE METHYLSULFATE 2.5 MG: 1 INJECTION, SOLUTION INTRAVENOUS at 08:09

## 2021-09-23 RX ADMIN — GLYCOPYRROLATE 0.4 MG: 0.2 INJECTION, SOLUTION INTRAMUSCULAR; INTRAVENOUS at 08:09

## 2021-09-23 RX ADMIN — SODIUM CHLORIDE, SODIUM LACTATE, POTASSIUM CHLORIDE, AND CALCIUM CHLORIDE: .6; .31; .03; .02 INJECTION, SOLUTION INTRAVENOUS at 07:25

## 2021-09-23 RX ADMIN — MIDAZOLAM HYDROCHLORIDE 0.5 MG: 1 INJECTION, SOLUTION INTRAMUSCULAR; INTRAVENOUS at 07:32

## 2021-09-23 RX ADMIN — LIDOCAINE HYDROCHLORIDE 50 MG: 10 INJECTION, SOLUTION EPIDURAL; INFILTRATION; INTRACAUDAL; PERINEURAL at 07:34

## 2021-09-23 RX ADMIN — FENTANYL CITRATE 50 MCG: 50 INJECTION, SOLUTION INTRAMUSCULAR; INTRAVENOUS at 07:34

## 2021-09-23 RX ADMIN — FENTANYL CITRATE 50 MCG: 0.05 INJECTION, SOLUTION INTRAMUSCULAR; INTRAVENOUS at 08:48

## 2021-09-23 RX ADMIN — ROCURONIUM BROMIDE 35 MG: 10 INJECTION, SOLUTION INTRAVENOUS at 07:34

## 2021-09-23 RX ADMIN — FENTANYL CITRATE 25 MCG: 50 INJECTION, SOLUTION INTRAMUSCULAR; INTRAVENOUS at 07:57

## 2021-09-23 NOTE — ANESTHESIA POSTPROCEDURE EVALUATION
Post-Op Assessment Note    CV Status:  Stable  Pain Score: 0    Pain management: adequate     Mental Status:  Alert and awake   Hydration Status:  Euvolemic   PONV Controlled:  Controlled   Airway Patency:  Patent      Post Op Vitals Reviewed: Yes      Staff: CRNA         No complications documented      BP   137/72   Temp   97 4   Pulse  84   Resp   16   SpO2   100

## 2021-09-30 DIAGNOSIS — E61.1 IRON DEFICIENCY: Primary | ICD-10-CM

## 2021-09-30 RX ORDER — SODIUM CHLORIDE 9 MG/ML
20 INJECTION, SOLUTION INTRAVENOUS ONCE
Status: CANCELLED | OUTPATIENT
Start: 2021-10-14

## 2021-10-12 ENCOUNTER — HOSPITAL ENCOUNTER (OUTPATIENT)
Dept: INFUSION CENTER | Facility: HOSPITAL | Age: 36
Discharge: HOME | End: 2021-10-12
Attending: INTERNAL MEDICINE

## 2021-10-13 ENCOUNTER — TELEPHONE (OUTPATIENT)
Dept: NEPHROLOGY | Facility: CLINIC | Age: 36
End: 2021-10-13

## 2021-10-14 ENCOUNTER — HOSPITAL ENCOUNTER (OUTPATIENT)
Dept: INFUSION CENTER | Facility: HOSPITAL | Age: 36
Discharge: HOME | End: 2021-10-14
Attending: INTERNAL MEDICINE
Payer: COMMERCIAL

## 2021-10-14 VITALS — OXYGEN SATURATION: 98 % | TEMPERATURE: 97.8 F | HEART RATE: 80 BPM | RESPIRATION RATE: 16 BRPM

## 2021-10-14 DIAGNOSIS — E61.1 IRON DEFICIENCY: Primary | ICD-10-CM

## 2021-10-14 PROCEDURE — 96365 THER/PROPH/DIAG IV INF INIT: CPT

## 2021-10-14 RX ORDER — SODIUM CHLORIDE 9 MG/ML
20 INJECTION, SOLUTION INTRAVENOUS ONCE
Status: CANCELLED | OUTPATIENT
Start: 2021-10-21

## 2021-10-14 RX ORDER — SODIUM CHLORIDE 9 MG/ML
20 INJECTION, SOLUTION INTRAVENOUS ONCE
Status: COMPLETED | OUTPATIENT
Start: 2021-10-14 | End: 2021-10-14

## 2021-10-14 RX ADMIN — IRON SUCROSE 300 MG: 20 INJECTION, SOLUTION INTRAVENOUS at 12:23

## 2021-10-14 RX ADMIN — SODIUM CHLORIDE 20 ML/HR: 0.9 INJECTION, SOLUTION INTRAVENOUS at 12:22

## 2021-10-18 ENCOUNTER — HOSPITAL ENCOUNTER (OUTPATIENT)
Dept: INFUSION CENTER | Facility: HOSPITAL | Age: 36
Discharge: HOME | End: 2021-10-18
Attending: INTERNAL MEDICINE
Payer: COMMERCIAL

## 2021-10-18 VITALS
DIASTOLIC BLOOD PRESSURE: 66 MMHG | HEART RATE: 91 BPM | OXYGEN SATURATION: 99 % | SYSTOLIC BLOOD PRESSURE: 128 MMHG | TEMPERATURE: 98.5 F | RESPIRATION RATE: 16 BRPM

## 2021-10-18 DIAGNOSIS — E61.1 IRON DEFICIENCY: Primary | ICD-10-CM

## 2021-10-18 PROCEDURE — 96374 THER/PROPH/DIAG INJ IV PUSH: CPT

## 2021-10-18 RX ORDER — SODIUM CHLORIDE 9 MG/ML
20 INJECTION, SOLUTION INTRAVENOUS ONCE
Status: CANCELLED | OUTPATIENT
Start: 2021-10-25

## 2021-10-18 RX ORDER — SODIUM CHLORIDE 9 MG/ML
20 INJECTION, SOLUTION INTRAVENOUS ONCE
Status: DISCONTINUED | OUTPATIENT
Start: 2021-10-18 | End: 2021-10-21 | Stop reason: HOSPADM

## 2021-10-18 RX ADMIN — IRON SUCROSE 300 MG: 20 INJECTION, SOLUTION INTRAVENOUS at 11:31

## 2021-10-25 ENCOUNTER — HOSPITAL ENCOUNTER (OUTPATIENT)
Dept: INFUSION CENTER | Facility: HOSPITAL | Age: 36
Discharge: HOME | End: 2021-10-25
Payer: COMMERCIAL

## 2021-10-25 VITALS
SYSTOLIC BLOOD PRESSURE: 120 MMHG | DIASTOLIC BLOOD PRESSURE: 56 MMHG | TEMPERATURE: 98.2 F | OXYGEN SATURATION: 98 % | RESPIRATION RATE: 17 BRPM | HEART RATE: 72 BPM

## 2021-10-25 DIAGNOSIS — E61.1 IRON DEFICIENCY: Primary | ICD-10-CM

## 2021-10-25 PROCEDURE — 96366 THER/PROPH/DIAG IV INF ADDON: CPT

## 2021-10-25 PROCEDURE — 96365 THER/PROPH/DIAG IV INF INIT: CPT

## 2021-10-25 RX ORDER — SODIUM CHLORIDE 9 MG/ML
20 INJECTION, SOLUTION INTRAVENOUS ONCE
Status: COMPLETED | OUTPATIENT
Start: 2021-10-25 | End: 2021-10-25

## 2021-10-25 RX ORDER — SODIUM CHLORIDE 9 MG/ML
20 INJECTION, SOLUTION INTRAVENOUS ONCE
Status: CANCELLED | OUTPATIENT
Start: 2021-10-28

## 2021-10-25 RX ADMIN — IRON SUCROSE 300 MG: 20 INJECTION, SOLUTION INTRAVENOUS at 11:12

## 2021-10-25 RX ADMIN — SODIUM CHLORIDE 20 ML/HR: 0.9 INJECTION, SOLUTION INTRAVENOUS at 11:04

## 2021-11-04 ENCOUNTER — TELEPHONE (OUTPATIENT)
Dept: NEPHROLOGY | Facility: CLINIC | Age: 36
End: 2021-11-04

## 2021-11-06 ENCOUNTER — APPOINTMENT (EMERGENCY)
Dept: CT IMAGING | Facility: HOSPITAL | Age: 36
End: 2021-11-06
Payer: COMMERCIAL

## 2021-11-06 ENCOUNTER — HOSPITAL ENCOUNTER (EMERGENCY)
Facility: HOSPITAL | Age: 36
Discharge: HOME | End: 2021-11-06
Attending: EMERGENCY MEDICINE | Admitting: EMERGENCY MEDICINE
Payer: COMMERCIAL

## 2021-11-06 VITALS
RESPIRATION RATE: 18 BRPM | BODY MASS INDEX: 22.68 KG/M2 | TEMPERATURE: 98.1 F | WEIGHT: 128 LBS | SYSTOLIC BLOOD PRESSURE: 130 MMHG | DIASTOLIC BLOOD PRESSURE: 65 MMHG | OXYGEN SATURATION: 99 % | HEIGHT: 63 IN | HEART RATE: 76 BPM

## 2021-11-06 DIAGNOSIS — N13.30 HYDRONEPHROSIS: ICD-10-CM

## 2021-11-06 DIAGNOSIS — N39.0 UTI (URINARY TRACT INFECTION): Primary | ICD-10-CM

## 2021-11-06 LAB
ALBUMIN SERPL BCP-MCNC: 3.6 G/DL (ref 3.5–5)
ALP SERPL-CCNC: 53 U/L (ref 46–116)
ALT SERPL W P-5'-P-CCNC: 24 U/L (ref 12–78)
ANION GAP SERPL CALCULATED.3IONS-SCNC: 13 MMOL/L (ref 4–13)
AST SERPL W P-5'-P-CCNC: 20 U/L (ref 5–45)
BACTERIA UR QL AUTO: ABNORMAL /HPF
BASOPHILS # BLD AUTO: 0.06 THOUSANDS/ΜL (ref 0–0.1)
BASOPHILS NFR BLD AUTO: 1 % (ref 0–1)
BILIRUB SERPL-MCNC: 0.27 MG/DL (ref 0.2–1)
BILIRUB UR QL STRIP: ABNORMAL
BUN SERPL-MCNC: 11 MG/DL (ref 5–25)
CALCIUM SERPL-MCNC: 8.3 MG/DL (ref 8.3–10.1)
CHLORIDE SERPL-SCNC: 105 MMOL/L (ref 100–108)
CLARITY UR: ABNORMAL
CO2 SERPL-SCNC: 20 MMOL/L (ref 21–32)
COLOR UR: YELLOW
CREAT SERPL-MCNC: 1.04 MG/DL (ref 0.6–1.3)
EOSINOPHIL # BLD AUTO: 0.04 THOUSAND/ΜL (ref 0–0.61)
EOSINOPHIL NFR BLD AUTO: 1 % (ref 0–6)
ERYTHROCYTE [DISTWIDTH] IN BLOOD BY AUTOMATED COUNT: 26.5 % (ref 11.6–15.1)
GFR SERPL CREATININE-BSD FRML MDRD: 69 ML/MIN/1.73SQ M
GLUCOSE SERPL-MCNC: 94 MG/DL (ref 65–140)
GLUCOSE UR STRIP-MCNC: NEGATIVE MG/DL
HCT VFR BLD AUTO: 39.2 % (ref 34.8–46.1)
HGB BLD-MCNC: 12 G/DL (ref 11.5–15.4)
HGB UR QL STRIP.AUTO: ABNORMAL
IMM GRANULOCYTES # BLD AUTO: 0.04 THOUSAND/UL (ref 0–0.2)
IMM GRANULOCYTES NFR BLD AUTO: 1 % (ref 0–2)
KETONES UR STRIP-MCNC: ABNORMAL MG/DL
LEUKOCYTE ESTERASE UR QL STRIP: NEGATIVE
LYMPHOCYTES # BLD AUTO: 2.24 THOUSANDS/ΜL (ref 0.6–4.47)
LYMPHOCYTES NFR BLD AUTO: 31 % (ref 14–44)
MCH RBC QN AUTO: 24.6 PG (ref 26.8–34.3)
MCHC RBC AUTO-ENTMCNC: 30.6 G/DL (ref 31.4–37.4)
MCV RBC AUTO: 81 FL (ref 82–98)
MONOCYTES # BLD AUTO: 0.65 THOUSAND/ΜL (ref 0.17–1.22)
MONOCYTES NFR BLD AUTO: 9 % (ref 4–12)
NEUTROPHILS # BLD AUTO: 4.31 THOUSANDS/ΜL (ref 1.85–7.62)
NEUTS SEG NFR BLD AUTO: 57 % (ref 43–75)
NITRITE UR QL STRIP: NEGATIVE
NON-SQ EPI CELLS URNS QL MICRO: ABNORMAL /HPF
NRBC BLD AUTO-RTO: 0 /100 WBCS
PH UR STRIP.AUTO: 6.5 [PH]
PLATELET # BLD AUTO: 317 THOUSANDS/UL (ref 149–390)
PMV BLD AUTO: 11 FL (ref 8.9–12.7)
POTASSIUM SERPL-SCNC: 2.9 MMOL/L (ref 3.5–5.3)
PROT SERPL-MCNC: 7 G/DL (ref 6.4–8.2)
PROT UR STRIP-MCNC: ABNORMAL MG/DL
RBC # BLD AUTO: 4.87 MILLION/UL (ref 3.81–5.12)
RBC #/AREA URNS AUTO: ABNORMAL /HPF
SODIUM SERPL-SCNC: 138 MMOL/L (ref 136–145)
SP GR UR STRIP.AUTO: >=1.03 (ref 1–1.03)
UROBILINOGEN UR QL STRIP.AUTO: 0.2 E.U./DL
WBC # BLD AUTO: 7.34 THOUSAND/UL (ref 4.31–10.16)
WBC #/AREA URNS AUTO: ABNORMAL /HPF

## 2021-11-06 PROCEDURE — 80053 COMPREHEN METABOLIC PANEL: CPT | Performed by: EMERGENCY MEDICINE

## 2021-11-06 PROCEDURE — 96376 TX/PRO/DX INJ SAME DRUG ADON: CPT

## 2021-11-06 PROCEDURE — 74177 CT ABD & PELVIS W/CONTRAST: CPT

## 2021-11-06 PROCEDURE — 99285 EMERGENCY DEPT VISIT HI MDM: CPT | Performed by: EMERGENCY MEDICINE

## 2021-11-06 PROCEDURE — 96365 THER/PROPH/DIAG IV INF INIT: CPT

## 2021-11-06 PROCEDURE — 81001 URINALYSIS AUTO W/SCOPE: CPT | Performed by: STUDENT IN AN ORGANIZED HEALTH CARE EDUCATION/TRAINING PROGRAM

## 2021-11-06 PROCEDURE — 96375 TX/PRO/DX INJ NEW DRUG ADDON: CPT

## 2021-11-06 PROCEDURE — 36415 COLL VENOUS BLD VENIPUNCTURE: CPT | Performed by: EMERGENCY MEDICINE

## 2021-11-06 PROCEDURE — 85025 COMPLETE CBC W/AUTO DIFF WBC: CPT | Performed by: EMERGENCY MEDICINE

## 2021-11-06 PROCEDURE — 99284 EMERGENCY DEPT VISIT MOD MDM: CPT

## 2021-11-06 RX ORDER — AMITRIPTYLINE HYDROCHLORIDE 10 MG/1
20 TABLET, FILM COATED ORAL
Status: ON HOLD | COMMUNITY
End: 2022-05-01 | Stop reason: SDUPTHER

## 2021-11-06 RX ORDER — MORPHINE SULFATE 4 MG/ML
4 INJECTION, SOLUTION INTRAMUSCULAR; INTRAVENOUS ONCE
Status: COMPLETED | OUTPATIENT
Start: 2021-11-06 | End: 2021-11-06

## 2021-11-06 RX ORDER — ONDANSETRON 2 MG/ML
4 INJECTION INTRAMUSCULAR; INTRAVENOUS ONCE
Status: COMPLETED | OUTPATIENT
Start: 2021-11-06 | End: 2021-11-06

## 2021-11-06 RX ORDER — ONDANSETRON 4 MG/1
4 TABLET, FILM COATED ORAL EVERY 6 HOURS PRN
Qty: 10 TABLET | Refills: 0 | Status: SHIPPED | OUTPATIENT
Start: 2021-11-06 | End: 2022-03-06 | Stop reason: HOSPADM

## 2021-11-06 RX ORDER — CEFTRIAXONE 1 G/50ML
1000 INJECTION, SOLUTION INTRAVENOUS ONCE
Status: COMPLETED | OUTPATIENT
Start: 2021-11-06 | End: 2021-11-06

## 2021-11-06 RX ORDER — POTASSIUM CHLORIDE 20 MEQ/1
40 TABLET, EXTENDED RELEASE ORAL ONCE
Status: COMPLETED | OUTPATIENT
Start: 2021-11-06 | End: 2021-11-06

## 2021-11-06 RX ORDER — KETOROLAC TROMETHAMINE 30 MG/ML
15 INJECTION, SOLUTION INTRAMUSCULAR; INTRAVENOUS ONCE
Status: COMPLETED | OUTPATIENT
Start: 2021-11-06 | End: 2021-11-06

## 2021-11-06 RX ORDER — OXYCODONE HYDROCHLORIDE 5 MG/1
5 TABLET ORAL EVERY 6 HOURS PRN
Qty: 6 TABLET | Refills: 0 | Status: SHIPPED | OUTPATIENT
Start: 2021-11-06 | End: 2022-03-06 | Stop reason: HOSPADM

## 2021-11-06 RX ORDER — CEPHALEXIN 500 MG/1
500 CAPSULE ORAL EVERY 6 HOURS SCHEDULED
Qty: 28 CAPSULE | Refills: 0 | Status: SHIPPED | OUTPATIENT
Start: 2021-11-06 | End: 2021-11-13

## 2021-11-06 RX ORDER — CEFADROXIL 500 MG/1
500 CAPSULE ORAL EVERY 12 HOURS SCHEDULED
Qty: 14 CAPSULE | Refills: 0 | Status: SHIPPED | OUTPATIENT
Start: 2021-11-06 | End: 2021-11-13

## 2021-11-06 RX ADMIN — MORPHINE SULFATE 4 MG: 4 INJECTION INTRAVENOUS at 21:46

## 2021-11-06 RX ADMIN — MORPHINE SULFATE 4 MG: 4 INJECTION INTRAVENOUS at 19:19

## 2021-11-06 RX ADMIN — MORPHINE SULFATE 4 MG: 4 INJECTION INTRAVENOUS at 20:33

## 2021-11-20 ENCOUNTER — HOSPITAL ENCOUNTER (EMERGENCY)
Facility: HOSPITAL | Age: 36
Discharge: HOME | End: 2021-11-20
Attending: EMERGENCY MEDICINE | Admitting: EMERGENCY MEDICINE
Payer: COMMERCIAL

## 2021-11-20 ENCOUNTER — APPOINTMENT (EMERGENCY)
Dept: CT IMAGING | Facility: HOSPITAL | Age: 36
End: 2021-11-20
Payer: COMMERCIAL

## 2021-11-20 VITALS
HEART RATE: 92 BPM | TEMPERATURE: 96.8 F | SYSTOLIC BLOOD PRESSURE: 121 MMHG | OXYGEN SATURATION: 100 % | BODY MASS INDEX: 22.68 KG/M2 | RESPIRATION RATE: 16 BRPM | HEIGHT: 63 IN | WEIGHT: 128 LBS | DIASTOLIC BLOOD PRESSURE: 60 MMHG

## 2021-11-20 DIAGNOSIS — E87.6 HYPOKALEMIA: ICD-10-CM

## 2021-11-20 DIAGNOSIS — R10.9 LEFT FLANK PAIN: Primary | ICD-10-CM

## 2021-11-20 LAB
ALBUMIN SERPL BCP-MCNC: 3.4 G/DL (ref 3.5–5)
ALP SERPL-CCNC: 64 U/L (ref 46–116)
ALT SERPL W P-5'-P-CCNC: 27 U/L (ref 12–78)
ANION GAP SERPL CALCULATED.3IONS-SCNC: 13 MMOL/L (ref 4–13)
APTT PPP: 58 SECONDS (ref 23–37)
AST SERPL W P-5'-P-CCNC: 23 U/L (ref 5–45)
BACTERIA UR QL AUTO: ABNORMAL /HPF
BASOPHILS # BLD AUTO: 0.09 THOUSANDS/ΜL (ref 0–0.1)
BASOPHILS NFR BLD AUTO: 1 % (ref 0–1)
BILIRUB SERPL-MCNC: 0.27 MG/DL (ref 0.2–1)
BILIRUB UR QL STRIP: NEGATIVE
BUN SERPL-MCNC: 13 MG/DL (ref 5–25)
CALCIUM ALBUM COR SERPL-MCNC: 8.3 MG/DL (ref 8.3–10.1)
CALCIUM SERPL-MCNC: 7.8 MG/DL (ref 8.3–10.1)
CHLORIDE SERPL-SCNC: 104 MMOL/L (ref 100–108)
CLARITY UR: ABNORMAL
CO2 SERPL-SCNC: 20 MMOL/L (ref 21–32)
COLOR UR: YELLOW
CREAT SERPL-MCNC: 0.94 MG/DL (ref 0.6–1.3)
EOSINOPHIL # BLD AUTO: 0.23 THOUSAND/ΜL (ref 0–0.61)
EOSINOPHIL NFR BLD AUTO: 4 % (ref 0–6)
FINE GRAN CASTS URNS QL MICRO: ABNORMAL /LPF
GFR SERPL CREATININE-BSD FRML MDRD: 78 ML/MIN/1.73SQ M
GLUCOSE SERPL-MCNC: 64 MG/DL (ref 65–140)
GLUCOSE UR STRIP-MCNC: NEGATIVE MG/DL
HCT VFR BLD AUTO: 39.9 % (ref 34.8–46.1)
HGB BLD-MCNC: 12.6 G/DL (ref 11.5–15.4)
HGB UR QL STRIP.AUTO: NEGATIVE
IMM GRANULOCYTES # BLD AUTO: 0.02 THOUSAND/UL (ref 0–0.2)
IMM GRANULOCYTES NFR BLD AUTO: 0 % (ref 0–2)
INR PPP: 0.93 (ref 0.84–1.19)
KETONES UR STRIP-MCNC: NEGATIVE MG/DL
LACTATE SERPL-SCNC: 0.9 MMOL/L (ref 0.5–2)
LEUKOCYTE ESTERASE UR QL STRIP: NEGATIVE
LIPASE SERPL-CCNC: 146 U/L (ref 73–393)
LYMPHOCYTES # BLD AUTO: 1.83 THOUSANDS/ΜL (ref 0.6–4.47)
LYMPHOCYTES NFR BLD AUTO: 28 % (ref 14–44)
MCH RBC QN AUTO: 25.9 PG (ref 26.8–34.3)
MCHC RBC AUTO-ENTMCNC: 31.6 G/DL (ref 31.4–37.4)
MCV RBC AUTO: 82 FL (ref 82–98)
MONOCYTES # BLD AUTO: 0.44 THOUSAND/ΜL (ref 0.17–1.22)
MONOCYTES NFR BLD AUTO: 7 % (ref 4–12)
NEUTROPHILS # BLD AUTO: 3.88 THOUSANDS/ΜL (ref 1.85–7.62)
NEUTS SEG NFR BLD AUTO: 60 % (ref 43–75)
NITRITE UR QL STRIP: NEGATIVE
NON-SQ EPI CELLS URNS QL MICRO: ABNORMAL /HPF
NRBC BLD AUTO-RTO: 0 /100 WBCS
PH UR STRIP.AUTO: 6 [PH]
PLATELET # BLD AUTO: 370 THOUSANDS/UL (ref 149–390)
PMV BLD AUTO: 10 FL (ref 8.9–12.7)
POTASSIUM SERPL-SCNC: 2.6 MMOL/L (ref 3.5–5.3)
PROT SERPL-MCNC: 7.1 G/DL (ref 6.4–8.2)
PROT UR STRIP-MCNC: ABNORMAL MG/DL
PROTHROMBIN TIME: 12.3 SECONDS (ref 11.6–14.5)
RBC # BLD AUTO: 4.87 MILLION/UL (ref 3.81–5.12)
RBC #/AREA URNS AUTO: ABNORMAL /HPF
SODIUM SERPL-SCNC: 137 MMOL/L (ref 136–145)
SP GR UR STRIP.AUTO: 1.02 (ref 1–1.03)
UROBILINOGEN UR QL STRIP.AUTO: 0.2 E.U./DL
WBC # BLD AUTO: 6.49 THOUSAND/UL (ref 4.31–10.16)
WBC #/AREA URNS AUTO: ABNORMAL /HPF

## 2021-11-20 PROCEDURE — 80053 COMPREHEN METABOLIC PANEL: CPT | Performed by: EMERGENCY MEDICINE

## 2021-11-20 PROCEDURE — 36415 COLL VENOUS BLD VENIPUNCTURE: CPT | Performed by: EMERGENCY MEDICINE

## 2021-11-20 PROCEDURE — 85610 PROTHROMBIN TIME: CPT | Performed by: EMERGENCY MEDICINE

## 2021-11-20 PROCEDURE — 99284 EMERGENCY DEPT VISIT MOD MDM: CPT

## 2021-11-20 PROCEDURE — 96361 HYDRATE IV INFUSION ADD-ON: CPT

## 2021-11-20 PROCEDURE — 96365 THER/PROPH/DIAG IV INF INIT: CPT

## 2021-11-20 PROCEDURE — 74177 CT ABD & PELVIS W/CONTRAST: CPT

## 2021-11-20 PROCEDURE — 85730 THROMBOPLASTIN TIME PARTIAL: CPT | Performed by: EMERGENCY MEDICINE

## 2021-11-20 PROCEDURE — 99285 EMERGENCY DEPT VISIT HI MDM: CPT | Performed by: EMERGENCY MEDICINE

## 2021-11-20 PROCEDURE — 81001 URINALYSIS AUTO W/SCOPE: CPT | Performed by: EMERGENCY MEDICINE

## 2021-11-20 PROCEDURE — 83605 ASSAY OF LACTIC ACID: CPT | Performed by: EMERGENCY MEDICINE

## 2021-11-20 PROCEDURE — 85025 COMPLETE CBC W/AUTO DIFF WBC: CPT | Performed by: EMERGENCY MEDICINE

## 2021-11-20 PROCEDURE — 83690 ASSAY OF LIPASE: CPT | Performed by: EMERGENCY MEDICINE

## 2021-11-20 PROCEDURE — 96375 TX/PRO/DX INJ NEW DRUG ADDON: CPT

## 2021-11-20 RX ORDER — MORPHINE SULFATE 4 MG/ML
4 INJECTION, SOLUTION INTRAMUSCULAR; INTRAVENOUS ONCE
Status: COMPLETED | OUTPATIENT
Start: 2021-11-20 | End: 2021-11-20

## 2021-11-20 RX ORDER — METHOCARBAMOL 500 MG/1
500 TABLET, FILM COATED ORAL 2 TIMES DAILY
Qty: 20 TABLET | Refills: 0 | Status: SHIPPED | OUTPATIENT
Start: 2021-11-20

## 2021-11-20 RX ORDER — POTASSIUM CHLORIDE 20 MEQ/1
40 TABLET, EXTENDED RELEASE ORAL ONCE
Status: COMPLETED | OUTPATIENT
Start: 2021-11-20 | End: 2021-11-20

## 2021-11-20 RX ORDER — CYCLOBENZAPRINE HCL 10 MG
10 TABLET ORAL ONCE
Status: COMPLETED | OUTPATIENT
Start: 2021-11-20 | End: 2021-11-20

## 2021-11-20 RX ORDER — POTASSIUM CHLORIDE 14.9 MG/ML
20 INJECTION INTRAVENOUS ONCE
Status: COMPLETED | OUTPATIENT
Start: 2021-11-20 | End: 2021-11-20

## 2021-11-20 RX ORDER — LIDOCAINE 50 MG/G
1 PATCH TOPICAL DAILY
Qty: 7 PATCH | Refills: 0 | Status: SHIPPED | OUTPATIENT
Start: 2021-11-20 | End: 2021-11-27

## 2021-11-20 RX ADMIN — SODIUM CHLORIDE 1000 ML: 0.9 INJECTION, SOLUTION INTRAVENOUS at 10:22

## 2021-11-20 RX ADMIN — IOHEXOL 100 ML: 350 INJECTION, SOLUTION INTRAVENOUS at 10:43

## 2021-11-20 RX ADMIN — MORPHINE SULFATE 4 MG: 4 INJECTION INTRAVENOUS at 10:22

## 2021-11-20 RX ADMIN — POTASSIUM CHLORIDE 40 MEQ: 1500 TABLET, EXTENDED RELEASE ORAL at 11:18

## 2021-11-20 RX ADMIN — CYCLOBENZAPRINE HYDROCHLORIDE 10 MG: 10 TABLET, FILM COATED ORAL at 11:49

## 2021-11-20 RX ADMIN — POTASSIUM CHLORIDE 20 MEQ: 14.9 INJECTION, SOLUTION INTRAVENOUS at 11:19

## 2021-11-22 ENCOUNTER — HOSPITAL ENCOUNTER (EMERGENCY)
Facility: HOSPITAL | Age: 36
Discharge: HOME | End: 2021-11-22
Attending: EMERGENCY MEDICINE
Payer: COMMERCIAL

## 2021-11-22 VITALS
DIASTOLIC BLOOD PRESSURE: 63 MMHG | RESPIRATION RATE: 20 BRPM | HEIGHT: 63 IN | TEMPERATURE: 97.7 F | WEIGHT: 128 LBS | OXYGEN SATURATION: 97 % | BODY MASS INDEX: 22.68 KG/M2 | HEART RATE: 72 BPM | SYSTOLIC BLOOD PRESSURE: 127 MMHG

## 2021-11-22 DIAGNOSIS — E87.6 HYPOKALEMIA: ICD-10-CM

## 2021-11-22 DIAGNOSIS — R20.2 PARESTHESIAS: Primary | ICD-10-CM

## 2021-11-22 LAB
ANION GAP SERPL CALCULATED.3IONS-SCNC: 9 MMOL/L (ref 4–13)
ATRIAL RATE: 84 BPM
BASOPHILS # BLD AUTO: 0.07 THOUSANDS/ΜL (ref 0–0.1)
BASOPHILS NFR BLD AUTO: 1 % (ref 0–1)
BUN SERPL-MCNC: 14 MG/DL (ref 5–25)
CALCIUM SERPL-MCNC: 7.9 MG/DL (ref 8.3–10.1)
CHLORIDE SERPL-SCNC: 106 MMOL/L (ref 100–108)
CO2 SERPL-SCNC: 24 MMOL/L (ref 21–32)
CREAT SERPL-MCNC: 0.85 MG/DL (ref 0.6–1.3)
EOSINOPHIL # BLD AUTO: 0.17 THOUSAND/ΜL (ref 0–0.61)
EOSINOPHIL NFR BLD AUTO: 3 % (ref 0–6)
GFR SERPL CREATININE-BSD FRML MDRD: 88 ML/MIN/1.73SQ M
GLUCOSE SERPL-MCNC: 87 MG/DL (ref 65–140)
HCT VFR BLD AUTO: 37.1 % (ref 34.8–46.1)
HGB BLD-MCNC: 11.6 G/DL (ref 11.5–15.4)
IMM GRANULOCYTES # BLD AUTO: 0.01 THOUSAND/UL (ref 0–0.2)
IMM GRANULOCYTES NFR BLD AUTO: 0 % (ref 0–2)
LYMPHOCYTES # BLD AUTO: 2.42 THOUSANDS/ΜL (ref 0.6–4.47)
LYMPHOCYTES NFR BLD AUTO: 36 % (ref 14–44)
MAGNESIUM SERPL-MCNC: 2 MG/DL (ref 1.6–2.6)
MCH RBC QN AUTO: 25.4 PG (ref 26.8–34.3)
MCHC RBC AUTO-ENTMCNC: 31.3 G/DL (ref 31.4–37.4)
MCV RBC AUTO: 81 FL (ref 82–98)
MONOCYTES # BLD AUTO: 0.46 THOUSAND/ΜL (ref 0.17–1.22)
MONOCYTES NFR BLD AUTO: 7 % (ref 4–12)
NEUTROPHILS # BLD AUTO: 3.54 THOUSANDS/ΜL (ref 1.85–7.62)
NEUTS SEG NFR BLD AUTO: 53 % (ref 43–75)
NRBC BLD AUTO-RTO: 0 /100 WBCS
P AXIS: 81 DEGREES
PLATELET # BLD AUTO: 325 THOUSANDS/UL (ref 149–390)
PMV BLD AUTO: 10 FL (ref 8.9–12.7)
POTASSIUM SERPL-SCNC: 2.5 MMOL/L (ref 3.5–5.3)
POTASSIUM SERPL-SCNC: 3.2 MMOL/L (ref 3.5–5.3)
PR INTERVAL: 124 MS
QRS AXIS: 50 DEGREES
QRSD INTERVAL: 94 MS
QT INTERVAL: 362 MS
QTC INTERVAL: 427 MS
RBC # BLD AUTO: 4.57 MILLION/UL (ref 3.81–5.12)
SODIUM SERPL-SCNC: 139 MMOL/L (ref 136–145)
T WAVE AXIS: 72 DEGREES
VENTRICULAR RATE: 84 BPM
WBC # BLD AUTO: 6.67 THOUSAND/UL (ref 4.31–10.16)

## 2021-11-22 PROCEDURE — 96365 THER/PROPH/DIAG IV INF INIT: CPT

## 2021-11-22 PROCEDURE — 80048 BASIC METABOLIC PNL TOTAL CA: CPT | Performed by: EMERGENCY MEDICINE

## 2021-11-22 PROCEDURE — 99284 EMERGENCY DEPT VISIT MOD MDM: CPT | Performed by: EMERGENCY MEDICINE

## 2021-11-22 PROCEDURE — 93005 ELECTROCARDIOGRAM TRACING: CPT

## 2021-11-22 PROCEDURE — 83735 ASSAY OF MAGNESIUM: CPT | Performed by: EMERGENCY MEDICINE

## 2021-11-22 PROCEDURE — 36415 COLL VENOUS BLD VENIPUNCTURE: CPT | Performed by: EMERGENCY MEDICINE

## 2021-11-22 PROCEDURE — 84132 ASSAY OF SERUM POTASSIUM: CPT

## 2021-11-22 PROCEDURE — 85025 COMPLETE CBC W/AUTO DIFF WBC: CPT | Performed by: EMERGENCY MEDICINE

## 2021-11-22 PROCEDURE — 99284 EMERGENCY DEPT VISIT MOD MDM: CPT

## 2021-11-22 PROCEDURE — 96366 THER/PROPH/DIAG IV INF ADDON: CPT

## 2021-11-22 PROCEDURE — 93010 ELECTROCARDIOGRAM REPORT: CPT | Performed by: INTERNAL MEDICINE

## 2021-11-22 RX ORDER — POTASSIUM CHLORIDE 20MEQ/15ML
40 LIQUID (ML) ORAL ONCE
Status: COMPLETED | OUTPATIENT
Start: 2021-11-22 | End: 2021-11-22

## 2021-11-22 RX ORDER — POTASSIUM CHLORIDE 14.9 MG/ML
20 INJECTION INTRAVENOUS ONCE
Status: COMPLETED | OUTPATIENT
Start: 2021-11-22 | End: 2021-11-22

## 2021-11-22 RX ADMIN — POTASSIUM CHLORIDE 20 MEQ: 14.9 INJECTION, SOLUTION INTRAVENOUS at 20:41

## 2021-11-22 RX ADMIN — POTASSIUM CHLORIDE 40 MEQ: 20 SOLUTION ORAL at 18:39

## 2021-11-22 RX ADMIN — POTASSIUM CHLORIDE 20 MEQ: 14.9 INJECTION, SOLUTION INTRAVENOUS at 18:39

## 2021-12-17 ENCOUNTER — TELEPHONE (OUTPATIENT)
Dept: NEPHROLOGY | Facility: CLINIC | Age: 36
End: 2021-12-17

## 2021-12-27 ENCOUNTER — TELEPHONE (OUTPATIENT)
Dept: NEPHROLOGY | Facility: CLINIC | Age: 36
End: 2021-12-27

## 2021-12-30 ENCOUNTER — TELEPHONE (OUTPATIENT)
Dept: NEPHROLOGY | Facility: CLINIC | Age: 36
End: 2021-12-30

## 2021-12-30 ENCOUNTER — APPOINTMENT (OUTPATIENT)
Dept: LAB | Facility: HOSPITAL | Age: 36
End: 2021-12-30
Payer: COMMERCIAL

## 2022-01-04 ENCOUNTER — HOSPITAL ENCOUNTER (OUTPATIENT)
Dept: INFUSION CENTER | Facility: HOSPITAL | Age: 37
Discharge: HOME | End: 2022-01-04
Attending: INTERNAL MEDICINE

## 2022-01-04 ENCOUNTER — OFFICE VISIT (OUTPATIENT)
Dept: URGENT CARE | Facility: CLINIC | Age: 37
End: 2022-01-04

## 2022-01-04 VITALS
WEIGHT: 135 LBS | RESPIRATION RATE: 18 BRPM | TEMPERATURE: 99.9 F | BODY MASS INDEX: 24.84 KG/M2 | HEIGHT: 62 IN | HEART RATE: 98 BPM | OXYGEN SATURATION: 100 %

## 2022-01-04 DIAGNOSIS — J06.9 VIRAL URI: Primary | ICD-10-CM

## 2022-01-04 DIAGNOSIS — H66.002 NON-RECURRENT ACUTE SUPPURATIVE OTITIS MEDIA OF LEFT EAR WITHOUT SPONTANEOUS RUPTURE OF TYMPANIC MEMBRANE: ICD-10-CM

## 2022-01-04 PROCEDURE — G0382 LEV 3 HOSP TYPE B ED VISIT: HCPCS | Performed by: PHYSICIAN ASSISTANT

## 2022-01-04 PROCEDURE — 87636 SARSCOV2 & INF A&B AMP PRB: CPT | Performed by: PHYSICIAN ASSISTANT

## 2022-01-04 PROCEDURE — 99283 EMERGENCY DEPT VISIT LOW MDM: CPT | Performed by: PHYSICIAN ASSISTANT

## 2022-01-04 RX ORDER — AMOXICILLIN 500 MG/1
500 CAPSULE ORAL EVERY 12 HOURS SCHEDULED
Qty: 14 CAPSULE | Refills: 0 | Status: SHIPPED | OUTPATIENT
Start: 2022-01-04 | End: 2022-01-11

## 2022-01-04 NOTE — LETTER
[facility] [place]  [address] [place] [address]  Dept: [phone]    January 4, 2022    Patient: Eliot Patel  YOB: 1985    Eliot Patel was seen and evaluated at our [facility]  Please note if Covid and Flu tests are negative, they may return to work when fever free for 24 hours without the use of a fever reducing agent  If Covid or Flu test is positive, they may return to work on 1/6/2022, as this is 5 days from the onset of symptoms  Upon return, they must then adhere to strict masking for an additional 5 days      Sincerely,    Christiana Smith PA-C

## 2022-01-04 NOTE — PATIENT INSTRUCTIONS
Take antibiotic as prescribed  Complete full dose even symptoms began to improve  Today you were tested for COVID-19 and influenza  Results will return approximately 5 days  The fastest way to receive results is to download the [facility] MyChart holly on your phone or great account on a computer  If you view your results on [employer], we will not call you  If you do not see results on [employer], we will call you if your positive or negative  WE CANNOT PRINT YOUR TEST RESULTS FROM THE URGENT CARE  This is against a law called HIPAA  You may print results from your MyChart (IT help [phone] option 5) or call medical records at [phone]  Prophylactically self quarantine  Department of health's newest recommendations as of December 27,2021 state the following:    IF you TEST POSITIVE for COVID-19  -stay home for 5 days  If you have no symptoms or your symptoms are resolving after 5 days, you can leave your house  Continue to wear a mask around others for 5 additional days  If you have a fever, continue to stay home until you fever resolves  IF YOU WERE EXPOSED TO SOMEONE WITH COVID 19  -if you have been boosted OR completed the primary series of Pfizer or Moderna vaccine within the last 6 months OR completed the primary series of J&J vaccine within the last 2 months, wear a mask around others for 10 days  Get tested on day 5 if possible  If you develop symptoms, get a test and stay home     -if you completed the primary series of Pfizer or Moderna vaccine over 6 months ago and are NOT boosted OR completed the primary series of J&J over 2 months ago and are NOT boosted OR are unvaccinated, stay home for 5 days  After that continue to wear a mask around others for 5 additional days  If you can not quarantine you must wear a mask for 10 days  Test on day 5 if possible  If you develops symptoms get a test and stay home  Drink lots of fluids to maintain hydration   Do not touch your face, wash hands often, and practice social distancing  There is no treatment for simple outpatient COVID-19 patients however, CDC recommends 2000 units vitamin D3 to boost the immune system  Those with severe illness, older age, or multiple comorbidities may qualify for monoclonal antibody infusions as treatment  Please call your doctor to see if you qualify  Call your family doctor to have a follow-up appointment in next few days  Go to ER if he began experiencing chest pain, shortness of breath, fever that is not responding to antipyretics or other severe symptoms

## 2022-01-04 NOTE — PROGRESS NOTES
[facility]        NAME: Arlene Bowen is a 39 y o  female  : 1985    MRN: [id number]  DATE: 2022  TIME: 3:42 PM    Assessment and Plan   Viral URI [J06 9]  1  Viral URI  COVID/FLU- Office Collect   2  Non-recurrent acute suppurative otitis media of left ear without spontaneous rupture of tympanic membrane  amoxicillin (AMOXIL) 500 mg capsule         Patient Instructions   Today you were tested for COVID-19 and influenza  Results will return approximately 5 days  The fastest way to receive results is to download the [facility] MyChart [facility]ly on your phone or great account on a computer  If you view your results on [employer], we will not call you  If you do not see results on [employer], we will call you if your positive or negative  WE CANNOT PRINT YOUR TEST RESULTS FROM THE URGENT CARE  This is against a law called HIPAA  You may print results from your [employer] (IT help [phone] option 5) or call medical records at [phone]  Prophylactically self quarantine  Department of health's newest recommendations as of  state the following:    IF you TEST POSITIVE for COVID-19  -stay home for 5 days  If you have no symptoms or your symptoms are resolving after 5 days, you can leave your house  Continue to wear a mask around others for 5 additional days  If you have a fever, continue to stay home until you fever resolves  IF YOU WERE EXPOSED TO SOMEONE WITH COVID 19  -if you have been boosted OR completed the primary series of Pfizer or Moderna vaccine within the last 6 months OR completed the primary series of J&J vaccine within the last 2 months, wear a mask around others for 10 days  Get tested on day 5 if possible    If you develop symptoms, get a test and stay home     -if you completed the primary series of Pfizer or Moderna vaccine over 6 months ago and are NOT boosted OR completed the primary series of J&J over 2 months ago and are NOT boosted OR are unvaccinated, stay home for 5 days  After that continue to wear a mask around others for 5 additional days  If you can not quarantine you must wear a mask for 10 days  Test on day 5 if possible  If you develops symptoms get a test and stay home  Drink lots of fluids to maintain hydration  Do not touch your face, wash hands often, and practice social distancing  There is no treatment for simple outpatient COVID-19 patients however, CDC recommends 2000 units vitamin D3 to boost the immune system  Those with severe illness, older age, or multiple comorbidities may qualify for monoclonal antibody infusions as treatment  Please call your doctor to see if you qualify  Call your family doctor to have a follow-up appointment in next few days  Go to ER if he began experiencing chest pain, shortness of breath, fever that is not responding to antipyretics or other severe symptoms  Follow up with PCP in 3-5 days  Proceed to  ER if symptoms worsen  Chief Complaint     Chief Complaint   Patient presents with    COVID-19     Symptoms started 4 days ago, Sore throat, cough, fever, ear pain, headache, sinus conjestion  Covid 19 Vaccinated          History of Present Illness       Patient is a 48-year-old female with no significant past medical history presents the office complaining of fever, headache, ear pain, congestion, sore throat, and cough for 4 days  She denies SOB, CP, difficulty breathing, loss of smell or taste, nausea, vomiting, or abdominal pain  Denies any known exposure to COVID-19  Denies prior COVID-19 infection  Positive COVID-19 vaccination  Review of Systems   Review of Systems   Constitutional: Positive for chills, fatigue and fever  HENT: Positive for congestion and ear pain  Negative for sore throat  Respiratory: Positive for cough  Negative for shortness of breath  Cardiovascular: Negative for chest pain and palpitations     Gastrointestinal: Negative for abdominal pain, diarrhea, nausea and vomiting  Musculoskeletal: Negative for myalgias  Neurological: Positive for headaches  Negative for dizziness and light-headedness  Current Medications       Current Outpatient Medications:     cyanocobalamin 1,000 mcg/mL, Inject 1 mL (1,000 mcg total) into a muscle every 30 (thirty) days, Disp: 1 mL, Rfl: 12    escitalopram (LEXAPRO) 10 mg tablet, Take 20 mg by mouth daily at bedtime , Disp: , Rfl:     potassium chloride 0 4 mEq/mL, Infuse 200 mL (80 mEq total) into a venous catheter daily 80 meq in 1 litre bag to be infused daily over 8 hours, Disp: 2800 mL, Rfl: 12    Syringe/Needle, Disp, (SYRINGE 3CC/25GX5/8") 25G X 5/8" 3 ML MISC, Use once monthly for B12 injection  , Disp: 1 each, Rfl: 11    AMILoride 5 mg tablet, Take 1 tablet (5 mg total) by mouth daily (Patient not taking: Reported on 1/4/2022 ), Disp: 30 tablet, Rfl: 0    amitriptyline (ELAVIL) 10 mg tablet, Take 10 mg by mouth daily at bedtime (Patient not taking: Reported on 1/4/2022 ), Disp: , Rfl:     amoxicillin (AMOXIL) 500 mg capsule, Take 1 capsule (500 mg total) by mouth every 12 (twelve) hours for 7 days, Disp: 14 capsule, Rfl: 0    B Complex Vitamins (VITAMIN B COMPLEX PO), Take 1 capsule by mouth daily  (Patient not taking: Reported on 1/4/2022 ), Disp: , Rfl:     Catheters MISC, by Does not apply route 2 (two) times a week Port care per [place] protocol (Patient not taking: Reported on 1/4/2022 ), Disp: 999 each, Rfl: 11    ergocalciferol (ERGOCALCIFEROL) 1 25 MG (33804 UT) capsule, Take 1 capsule (50,000 Units total) by mouth once a week Mondays and Thursdays (Patient not taking: Reported on 1/4/2022 ), Disp: , Rfl:     ferrous sulfate 325 (65 Fe) mg tablet, Take 325 mg by mouth every other day Last took 3/9/20 (Patient not taking: Reported on 1/4/2022 ), Disp: , Rfl:     lidocaine (Lidoderm) 5 %, Apply 1 patch topically daily for 7 days Remove & Discard patch within 12 hours or as directed by MD, Disp: 7 patch, Rfl: 0    methocarbamol (ROBAXIN) 500 mg tablet, Take 1 tablet (500 mg total) by mouth 2 (two) times a day (Patient not taking: Reported on 2022 ), Disp: 20 tablet, Rfl: 0    ondansetron (ZOFRAN) 4 mg tablet, Take 1 tablet (4 mg total) by mouth every 6 (six) hours as needed for nausea or vomiting (Patient not taking: Reported on 2022 ), Disp: 10 tablet, Rfl: 0    oxyCODONE (ROXICODONE) 5 immediate release tablet, Take 1 tablet (5 mg total) by mouth every 6 (six) hours as needed for moderate pain for up to 6 dosesMax Daily Amount: 20 mg (Patient not taking: Reported on 2022 ), Disp: 6 tablet, Rfl: 0    pancrelipase, Lip-Prot-Amyl, (Creon) 6,000 units delayed release capsule, Take 6,000 units of lipase by mouth 3 (three) times a day with meals (Patient not taking: Reported on 2022 ), Disp: , Rfl:     Current Allergies     Allergies as of 2022 - Reviewed 2022   Allergen Reaction Noted    Nsaids Other (See Comments) 2017    Prednisone  2019            The following portions of the patient's history were reviewed and updated as appropriate: allergies, current medications, past family history, past medical history, past social history, past surgical history and problem list      Past Medical History:   Diagnosis Date    H  pylori infection     Hypokalemia     Left lower quadrant abdominal pain 2020    Migraine     Rectal bleeding     Renal disorder     Rhabdomyolysis        Past Surgical History:   Procedure Laterality Date    ABDOMINAL SURGERY      APPENDECTOMY       SECTION      CHOLECYSTECTOMY      COSMETIC SURGERY      "tummy tuck"    FL GUIDED CENTRAL VENOUS ACCESS DEVICE INSERTION  2018    GASTRIC BYPASS      HYSTERECTOMY      LAPAROSCOPY      LA SURG DIAGNOSTIC EXAM, ANORECTAL N/A 2021    Procedure: ANAL EXAM UNDER ANESTHESIA; HEMORRHOIDECTOMY;  Surgeon: Neeraj Betancourt DO;  Location:  MAIN OR; Service: General    TUNNELED VENOUS PORT PLACEMENT N/A 12/21/2018    Procedure: INSERTION VENOUS PORT (PORT-A-CATH); Surgeon: Per Retana DO;  Location: [facility];  Service: General       Family History   Problem Relation Age of Onset    No Known Problems Mother     Hypertension Father     Diabetes Father     Diabetes Maternal Grandfather          Medications have been verified  Objective   Pulse 98   Temp 99 9 °F (37 7 °C)   Resp 18   Ht 5' 2" (1 575 m)   Wt 61 2 kg (135 lb)   LMP  (LMP Unknown)   SpO2 100%   BMI 24 69 kg/m²   No LMP recorded (lmp unknown)  Patient has had a hysterectomy  Physical Exam     Physical Exam  Vitals and nursing note reviewed  Constitutional:       Appearance: Normal appearance  She is well-developed  HENT:      Head: Normocephalic and atraumatic  Right Ear: Ear canal and external ear normal  No middle ear effusion  Tympanic membrane is bulging  Tympanic membrane is not erythematous  Left Ear: Ear canal and external ear normal  A middle ear effusion is present  Tympanic membrane is erythematous and bulging  Nose: Congestion and rhinorrhea present  Mouth/Throat:      Pharynx: Uvula midline  Eyes:      General: Lids are normal       Conjunctiva/sclera: Conjunctivae normal       Pupils: Pupils are equal, round, and reactive to light  Cardiovascular:      Rate and Rhythm: Normal rate and regular rhythm  Pulses: Normal pulses  Heart sounds: Normal heart sounds  No murmur heard  No friction rub  No gallop  Pulmonary:      Effort: Pulmonary effort is normal       Breath sounds: Normal breath sounds  No wheezing, rhonchi or rales  Musculoskeletal:         General: Normal range of motion  Cervical back: Neck supple  Lymphadenopathy:      Cervical: No cervical adenopathy  Skin:     General: Skin is warm and dry  Capillary Refill: Capillary refill takes less than 2 seconds     Neurological:      Mental Status: She is alert

## 2022-01-06 NOTE — UTILIZATION REVIEW
Notification of Discharge  This is a Notification of Discharge from our facility [address]  Please be advised that this patient has been discharge from our facility  Below you will find the admission and discharge date and time including the patients disposition  PRESENTATION DATE: 11/26/2018  4:53 PM  IP ADMISSION DATE: N/A N/A  DISCHARGE DATE: 11/29/2018 11:28 AM  DISPOSITION: [id number] Utilization Review Department  Phone: [phone]; Fax [phone]  ATTENTION: Please call with any questions or concerns to [phone]  and carefully listen to the prompts so that you are directed to the right person  Send all requests for admission clinical reviews, approved or denied determinations and any other requests to fax [phone]   All voicemails are confidential  Negative/Unknown

## 2022-01-07 ENCOUNTER — TELEPHONE (OUTPATIENT)
Dept: NEPHROLOGY | Facility: CLINIC | Age: 37
End: 2022-01-07

## 2022-01-07 NOTE — TELEPHONE ENCOUNTER
Shelby Smalls for the infusion center called stating pt is now having weekly BMP and monthly Mag  Labs done as an out patient vs the nurse  He asked if he cant add a note in her chart stating that

## 2022-01-09 ENCOUNTER — PATIENT MESSAGE (OUTPATIENT)
Dept: NEPHROLOGY | Facility: CLINIC | Age: 37
End: 2022-01-09

## 2022-01-09 LAB
FLUAV RNA RESP QL NAA+PROBE: NEGATIVE
FLUBV RNA RESP QL NAA+PROBE: NEGATIVE
SARS-COV-2 RNA RESP QL NAA+PROBE: POSITIVE

## 2022-01-18 ENCOUNTER — APPOINTMENT (OUTPATIENT)
Dept: LAB | Facility: HOSPITAL | Age: 37
End: 2022-01-18
Payer: COMMERCIAL

## 2022-01-25 ENCOUNTER — APPOINTMENT (OUTPATIENT)
Dept: LAB | Facility: HOSPITAL | Age: 37
End: 2022-01-25
Payer: COMMERCIAL

## 2022-01-31 ENCOUNTER — APPOINTMENT (OUTPATIENT)
Dept: LAB | Facility: HOSPITAL | Age: 37
End: 2022-01-31
Payer: COMMERCIAL

## 2022-02-21 ENCOUNTER — APPOINTMENT (OUTPATIENT)
Dept: LAB | Facility: HOSPITAL | Age: 37
End: 2022-02-21
Payer: COMMERCIAL

## 2022-03-03 ENCOUNTER — TELEPHONE (OUTPATIENT)
Dept: OTHER | Facility: OTHER | Age: 37
End: 2022-03-03

## 2022-03-03 ENCOUNTER — APPOINTMENT (OUTPATIENT)
Dept: LAB | Facility: HOSPITAL | Age: 37
DRG: 425 | End: 2022-03-03
Payer: COMMERCIAL

## 2022-03-03 ENCOUNTER — HOSPITAL ENCOUNTER (INPATIENT)
Facility: HOSPITAL | Age: 37
LOS: 3 days | Discharge: HOME HEALTH | DRG: 425 | End: 2022-03-06
Attending: STUDENT IN AN ORGANIZED HEALTH CARE EDUCATION/TRAINING PROGRAM | Admitting: STUDENT IN AN ORGANIZED HEALTH CARE EDUCATION/TRAINING PROGRAM
Payer: COMMERCIAL

## 2022-03-03 DIAGNOSIS — R11.10 INTRACTABLE VOMITING: ICD-10-CM

## 2022-03-03 DIAGNOSIS — E87.6 HYPOKALEMIA: Primary | ICD-10-CM

## 2022-03-03 LAB
ANION GAP SERPL CALCULATED.3IONS-SCNC: 13 MMOL/L (ref 4–13)
BASOPHILS # BLD AUTO: 0.05 THOUSANDS/ΜL (ref 0–0.1)
BASOPHILS NFR BLD AUTO: 1 % (ref 0–1)
BUN SERPL-MCNC: 14 MG/DL (ref 5–25)
CALCIUM SERPL-MCNC: 8.2 MG/DL (ref 8.3–10.1)
CHLORIDE SERPL-SCNC: 96 MMOL/L (ref 100–108)
CK SERPL-CCNC: 104 U/L (ref 26–192)
CO2 SERPL-SCNC: 24 MMOL/L (ref 21–32)
CREAT SERPL-MCNC: 1.06 MG/DL (ref 0.6–1.3)
EOSINOPHIL # BLD AUTO: 0.05 THOUSAND/ΜL (ref 0–0.61)
EOSINOPHIL NFR BLD AUTO: 1 % (ref 0–6)
ERYTHROCYTE [DISTWIDTH] IN BLOOD BY AUTOMATED COUNT: 14.5 % (ref 11.6–15.1)
GFR SERPL CREATININE-BSD FRML MDRD: 66 ML/MIN/1.73SQ M
GLUCOSE SERPL-MCNC: 92 MG/DL (ref 65–140)
HCT VFR BLD AUTO: 40.8 % (ref 34.8–46.1)
HGB BLD-MCNC: 14.5 G/DL (ref 11.5–15.4)
IMM GRANULOCYTES # BLD AUTO: 0.02 THOUSAND/UL (ref 0–0.2)
IMM GRANULOCYTES NFR BLD AUTO: 0 % (ref 0–2)
LYMPHOCYTES # BLD AUTO: 1 THOUSANDS/ΜL (ref 0.6–4.47)
LYMPHOCYTES NFR BLD AUTO: 15 % (ref 14–44)
MAGNESIUM SERPL-MCNC: 2.1 MG/DL (ref 1.6–2.6)
MCH RBC QN AUTO: 29.7 PG (ref 26.8–34.3)
MCHC RBC AUTO-ENTMCNC: 35.5 G/DL (ref 31.4–37.4)
MCV RBC AUTO: 83 FL (ref 82–98)
MONOCYTES # BLD AUTO: 0.5 THOUSAND/ΜL (ref 0.17–1.22)
MONOCYTES NFR BLD AUTO: 8 % (ref 4–12)
NEUTROPHILS # BLD AUTO: 5.03 THOUSANDS/ΜL (ref 1.85–7.62)
NEUTS SEG NFR BLD AUTO: 75 % (ref 43–75)
NRBC BLD AUTO-RTO: 0 /100 WBCS
PHOSPHATE SERPL-MCNC: 3.7 MG/DL (ref 2.7–4.5)
PLATELET # BLD AUTO: 253 THOUSANDS/UL (ref 149–390)
PMV BLD AUTO: 9.5 FL (ref 8.9–12.7)
POTASSIUM SERPL-SCNC: 1.9 MMOL/L (ref 3.5–5.3)
RBC # BLD AUTO: 4.89 MILLION/UL (ref 3.81–5.12)
SODIUM SERPL-SCNC: 133 MMOL/L (ref 136–145)
WBC # BLD AUTO: 6.65 THOUSAND/UL (ref 4.31–10.16)

## 2022-03-03 PROCEDURE — 99222 1ST HOSP IP/OBS MODERATE 55: CPT | Performed by: NURSE PRACTITIONER

## 2022-03-03 PROCEDURE — 80048 BASIC METABOLIC PNL TOTAL CA: CPT | Performed by: STUDENT IN AN ORGANIZED HEALTH CARE EDUCATION/TRAINING PROGRAM

## 2022-03-03 PROCEDURE — 84100 ASSAY OF PHOSPHORUS: CPT | Performed by: STUDENT IN AN ORGANIZED HEALTH CARE EDUCATION/TRAINING PROGRAM

## 2022-03-03 PROCEDURE — 96375 TX/PRO/DX INJ NEW DRUG ADDON: CPT

## 2022-03-03 PROCEDURE — 82550 ASSAY OF CK (CPK): CPT | Performed by: STUDENT IN AN ORGANIZED HEALTH CARE EDUCATION/TRAINING PROGRAM

## 2022-03-03 PROCEDURE — 83735 ASSAY OF MAGNESIUM: CPT | Performed by: STUDENT IN AN ORGANIZED HEALTH CARE EDUCATION/TRAINING PROGRAM

## 2022-03-03 PROCEDURE — 93005 ELECTROCARDIOGRAM TRACING: CPT

## 2022-03-03 PROCEDURE — 99284 EMERGENCY DEPT VISIT MOD MDM: CPT

## 2022-03-03 PROCEDURE — 85025 COMPLETE CBC W/AUTO DIFF WBC: CPT | Performed by: STUDENT IN AN ORGANIZED HEALTH CARE EDUCATION/TRAINING PROGRAM

## 2022-03-03 PROCEDURE — 99284 EMERGENCY DEPT VISIT MOD MDM: CPT | Performed by: STUDENT IN AN ORGANIZED HEALTH CARE EDUCATION/TRAINING PROGRAM

## 2022-03-03 PROCEDURE — 96365 THER/PROPH/DIAG IV INF INIT: CPT

## 2022-03-03 RX ORDER — ESCITALOPRAM OXALATE 10 MG/1
20 TABLET ORAL
Status: DISCONTINUED | OUTPATIENT
Start: 2022-03-03 | End: 2022-03-06 | Stop reason: HOSPADM

## 2022-03-03 RX ORDER — FERROUS SULFATE 325(65) MG
325 TABLET ORAL EVERY OTHER DAY
Status: DISCONTINUED | OUTPATIENT
Start: 2022-03-04 | End: 2022-03-04

## 2022-03-03 RX ORDER — PROMETHAZINE HYDROCHLORIDE 25 MG/ML
25 INJECTION, SOLUTION INTRAMUSCULAR; INTRAVENOUS EVERY 6 HOURS PRN
Status: DISCONTINUED | OUTPATIENT
Start: 2022-03-03 | End: 2022-03-06 | Stop reason: HOSPADM

## 2022-03-03 RX ORDER — METHOCARBAMOL 500 MG/1
500 TABLET, FILM COATED ORAL 2 TIMES DAILY
Status: DISCONTINUED | OUTPATIENT
Start: 2022-03-03 | End: 2022-03-06 | Stop reason: HOSPADM

## 2022-03-03 RX ORDER — AMITRIPTYLINE HYDROCHLORIDE 10 MG/1
20 TABLET, FILM COATED ORAL
Status: DISCONTINUED | OUTPATIENT
Start: 2022-03-03 | End: 2022-03-06 | Stop reason: HOSPADM

## 2022-03-03 RX ORDER — ACETAMINOPHEN 325 MG/1
650 TABLET ORAL EVERY 6 HOURS PRN
Status: DISCONTINUED | OUTPATIENT
Start: 2022-03-03 | End: 2022-03-06 | Stop reason: HOSPADM

## 2022-03-03 RX ORDER — POTASSIUM CHLORIDE AND SODIUM CHLORIDE 900; 300 MG/100ML; MG/100ML
100 INJECTION, SOLUTION INTRAVENOUS CONTINUOUS
Status: DISCONTINUED | OUTPATIENT
Start: 2022-03-03 | End: 2022-03-05

## 2022-03-03 RX ORDER — POTASSIUM CHLORIDE 14.9 MG/ML
20 INJECTION INTRAVENOUS
Status: COMPLETED | OUTPATIENT
Start: 2022-03-03 | End: 2022-03-04

## 2022-03-03 RX ORDER — AMILORIDE HYDROCHLORIDE 5 MG/1
5 TABLET ORAL DAILY
Status: DISCONTINUED | OUTPATIENT
Start: 2022-03-04 | End: 2022-03-06 | Stop reason: HOSPADM

## 2022-03-03 RX ORDER — ONDANSETRON 2 MG/ML
4 INJECTION INTRAMUSCULAR; INTRAVENOUS EVERY 8 HOURS PRN
Status: DISCONTINUED | OUTPATIENT
Start: 2022-03-03 | End: 2022-03-06 | Stop reason: HOSPADM

## 2022-03-03 RX ORDER — B-COMPLEX WITH VITAMIN C
TABLET ORAL DAILY
Status: DISCONTINUED | OUTPATIENT
Start: 2022-03-04 | End: 2022-03-03

## 2022-03-03 RX ADMIN — ESCITALOPRAM OXALATE 20 MG: 10 TABLET ORAL at 22:42

## 2022-03-03 RX ADMIN — AMITRIPTYLINE HYDROCHLORIDE 20 MG: 10 TABLET, FILM COATED ORAL at 22:42

## 2022-03-03 RX ADMIN — METHOCARBAMOL TABLETS 500 MG: 500 TABLET, COATED ORAL at 22:42

## 2022-03-03 RX ADMIN — POTASSIUM CHLORIDE 20 MEQ: 200 INJECTION, SOLUTION INTRAVENOUS at 20:17

## 2022-03-03 RX ADMIN — SODIUM CHLORIDE, SODIUM LACTATE, POTASSIUM CHLORIDE, AND CALCIUM CHLORIDE 1000 ML: .6; .31; .03; .02 INJECTION, SOLUTION INTRAVENOUS at 19:48

## 2022-03-03 RX ADMIN — ONDANSETRON 4 MG: 2 INJECTION INTRAMUSCULAR; INTRAVENOUS at 22:16

## 2022-03-03 RX ADMIN — POTASSIUM CHLORIDE 20 MEQ: 200 INJECTION, SOLUTION INTRAVENOUS at 22:16

## 2022-03-03 RX ADMIN — POTASSIUM CHLORIDE AND SODIUM CHLORIDE 100 ML/HR: 900; 300 INJECTION, SOLUTION INTRAVENOUS at 21:57

## 2022-03-03 NOTE — TELEPHONE ENCOUNTER
[facility] on call provider Melissa Manriquez  Provider responded states, he will call pt  Lab Result: Potassium 2 1    Date/Time Drawn: 03/03/2022    Ordering Provider: Jess Nelson    Lab Personnel's Name: Major Raw        The following critical/stat result was read back to the lab as stated above and [employer] messaged to the on-call provider

## 2022-03-04 PROBLEM — R11.10 INTRACTABLE VOMITING: Status: ACTIVE | Noted: 2022-03-04

## 2022-03-04 PROBLEM — E87.1 ACUTE HYPONATREMIA: Status: ACTIVE | Noted: 2022-03-04

## 2022-03-04 LAB
ANION GAP SERPL CALCULATED.3IONS-SCNC: 10 MMOL/L (ref 4–13)
ANION GAP SERPL CALCULATED.3IONS-SCNC: 12 MMOL/L (ref 4–13)
ANION GAP SERPL CALCULATED.3IONS-SCNC: 8 MMOL/L (ref 4–13)
ATRIAL RATE: 83 BPM
ATRIAL RATE: 85 BPM
BUN SERPL-MCNC: 11 MG/DL (ref 5–25)
BUN SERPL-MCNC: 14 MG/DL (ref 5–25)
BUN SERPL-MCNC: 15 MG/DL (ref 5–25)
CA-I BLD-SCNC: 1.07 MMOL/L (ref 1.12–1.32)
CALCIUM SERPL-MCNC: 7.3 MG/DL (ref 8.3–10.1)
CALCIUM SERPL-MCNC: 7.4 MG/DL (ref 8.3–10.1)
CALCIUM SERPL-MCNC: 7.4 MG/DL (ref 8.3–10.1)
CHLORIDE SERPL-SCNC: 101 MMOL/L (ref 100–108)
CHLORIDE SERPL-SCNC: 105 MMOL/L (ref 100–108)
CHLORIDE SERPL-SCNC: 107 MMOL/L (ref 100–108)
CO2 SERPL-SCNC: 19 MMOL/L (ref 21–32)
CO2 SERPL-SCNC: 22 MMOL/L (ref 21–32)
CO2 SERPL-SCNC: 25 MMOL/L (ref 21–32)
CREAT SERPL-MCNC: 0.88 MG/DL (ref 0.6–1.3)
CREAT SERPL-MCNC: 1.19 MG/DL (ref 0.6–1.3)
CREAT SERPL-MCNC: 1.19 MG/DL (ref 0.6–1.3)
GFR SERPL CREATININE-BSD FRML MDRD: 58 ML/MIN/1.73SQ M
GFR SERPL CREATININE-BSD FRML MDRD: 58 ML/MIN/1.73SQ M
GFR SERPL CREATININE-BSD FRML MDRD: 83 ML/MIN/1.73SQ M
GLUCOSE SERPL-MCNC: 180 MG/DL (ref 65–140)
GLUCOSE SERPL-MCNC: 85 MG/DL (ref 65–140)
GLUCOSE SERPL-MCNC: 93 MG/DL (ref 65–140)
P AXIS: 76 DEGREES
P AXIS: 80 DEGREES
POTASSIUM SERPL-SCNC: 2.3 MMOL/L (ref 3.5–5.3)
POTASSIUM SERPL-SCNC: 2.7 MMOL/L (ref 3.5–5.3)
POTASSIUM SERPL-SCNC: 3.6 MMOL/L (ref 3.5–5.3)
PR INTERVAL: 120 MS
PR INTERVAL: 122 MS
QRS AXIS: 77 DEGREES
QRS AXIS: 78 DEGREES
QRSD INTERVAL: 104 MS
QRSD INTERVAL: 106 MS
QT INTERVAL: 488 MS
QT INTERVAL: 502 MS
QTC INTERVAL: 580 MS
QTC INTERVAL: 589 MS
SODIUM SERPL-SCNC: 136 MMOL/L (ref 136–145)
SODIUM SERPL-SCNC: 136 MMOL/L (ref 136–145)
SODIUM SERPL-SCNC: 137 MMOL/L (ref 136–145)
T WAVE AXIS: 66 DEGREES
T WAVE AXIS: 69 DEGREES
VENTRICULAR RATE: 83 BPM
VENTRICULAR RATE: 85 BPM

## 2022-03-04 PROCEDURE — 80048 BASIC METABOLIC PNL TOTAL CA: CPT | Performed by: NURSE PRACTITIONER

## 2022-03-04 PROCEDURE — 93010 ELECTROCARDIOGRAM REPORT: CPT | Performed by: INTERNAL MEDICINE

## 2022-03-04 PROCEDURE — 99232 SBSQ HOSP IP/OBS MODERATE 35: CPT | Performed by: STUDENT IN AN ORGANIZED HEALTH CARE EDUCATION/TRAINING PROGRAM

## 2022-03-04 PROCEDURE — 99254 IP/OBS CNSLTJ NEW/EST MOD 60: CPT | Performed by: PHYSICIAN ASSISTANT

## 2022-03-04 PROCEDURE — 80048 BASIC METABOLIC PNL TOTAL CA: CPT | Performed by: STUDENT IN AN ORGANIZED HEALTH CARE EDUCATION/TRAINING PROGRAM

## 2022-03-04 PROCEDURE — 82330 ASSAY OF CALCIUM: CPT | Performed by: NURSE PRACTITIONER

## 2022-03-04 RX ORDER — POTASSIUM CHLORIDE 14.9 MG/ML
20 INJECTION INTRAVENOUS
Status: COMPLETED | OUTPATIENT
Start: 2022-03-04 | End: 2022-03-05

## 2022-03-04 RX ORDER — FERROUS SULFATE 325(65) MG
325 TABLET ORAL EVERY OTHER DAY
Status: DISCONTINUED | OUTPATIENT
Start: 2022-03-05 | End: 2022-03-06 | Stop reason: HOSPADM

## 2022-03-04 RX ADMIN — POTASSIUM CHLORIDE AND SODIUM CHLORIDE 100 ML/HR: 900; 300 INJECTION, SOLUTION INTRAVENOUS at 07:35

## 2022-03-04 RX ADMIN — ONDANSETRON 4 MG: 2 INJECTION INTRAMUSCULAR; INTRAVENOUS at 07:37

## 2022-03-04 RX ADMIN — PANCRELIPASE 6000 UNITS: 30000; 6000; 19000 CAPSULE, DELAYED RELEASE PELLETS ORAL at 11:57

## 2022-03-04 RX ADMIN — POTASSIUM CHLORIDE 20 MEQ: 200 INJECTION, SOLUTION INTRAVENOUS at 09:49

## 2022-03-04 RX ADMIN — POTASSIUM CHLORIDE 20 MEQ: 14.9 INJECTION, SOLUTION INTRAVENOUS at 07:37

## 2022-03-04 RX ADMIN — POTASSIUM CHLORIDE 20 MEQ: 200 INJECTION, SOLUTION INTRAVENOUS at 22:37

## 2022-03-04 RX ADMIN — AMITRIPTYLINE HYDROCHLORIDE 20 MG: 10 TABLET, FILM COATED ORAL at 21:43

## 2022-03-04 RX ADMIN — POTASSIUM CHLORIDE 20 MEQ: 14.9 INJECTION, SOLUTION INTRAVENOUS at 05:41

## 2022-03-04 RX ADMIN — PANCRELIPASE 6000 UNITS: 30000; 6000; 19000 CAPSULE, DELAYED RELEASE PELLETS ORAL at 07:37

## 2022-03-04 RX ADMIN — METHOCARBAMOL TABLETS 500 MG: 500 TABLET, COATED ORAL at 07:37

## 2022-03-04 RX ADMIN — POTASSIUM CHLORIDE AND SODIUM CHLORIDE 100 ML/HR: 900; 300 INJECTION, SOLUTION INTRAVENOUS at 17:45

## 2022-03-04 RX ADMIN — POTASSIUM CHLORIDE 20 MEQ: 200 INJECTION, SOLUTION INTRAVENOUS at 11:57

## 2022-03-04 RX ADMIN — ESCITALOPRAM OXALATE 20 MG: 10 TABLET ORAL at 21:43

## 2022-03-04 RX ADMIN — ONDANSETRON 4 MG: 2 INJECTION INTRAMUSCULAR; INTRAVENOUS at 17:41

## 2022-03-04 RX ADMIN — METHOCARBAMOL TABLETS 500 MG: 500 TABLET, COATED ORAL at 17:35

## 2022-03-04 RX ADMIN — PANCRELIPASE 6000 UNITS: 30000; 6000; 19000 CAPSULE, DELAYED RELEASE PELLETS ORAL at 17:35

## 2022-03-04 RX ADMIN — AMILORIDE HYDROCHLORIDE 5 MG: 5 TABLET ORAL at 07:39

## 2022-03-04 NOTE — CASE MANAGEMENT
Case Management Discharge Planning Note    Patient name Em [facility]  Location /-31 MRN [id number]  : 1985 Date 3/4/2022       Current Admission Date: 3/3/2022  Current Admission Diagnosis:Hypokalemia   Patient Active Problem List    Diagnosis Date Noted    Intractable vomiting 2022    Acute hyponatremia 2022    Iron deficiency 2021    Other hemorrhoids     Open wound / skin tear of right chest wall 2021    Cellulitis of chest wall 2021    Chronic pancreatitis ([facility] [address] ) 2021    Acute on chronic abdominal pain 2021    Blood creatinine increased compared with prior measurement 2021    Gas pain 2021    Left lower quadrant abdominal pain 2020    Middle ear effusion 2020    Normal anion gap metabolic acidosis     Nausea & vomiting 2019    Vertigo 2019    Stress fracture of right foot with routine healing 2019    Right ovarian cyst 2019    Chest pain 2019    Other intestinal malabsorption 10/05/2018    Gastric bypass status for obesity 10/02/2018    GERD (gastroesophageal reflux disease) 2018    Paresthesia of both lower extremities 08/15/2018    Paresthesias 08/15/2018    Fatigue 2018    Elevated CPK 2018    Vitamin B12 deficiency 2018    Cervical lymphadenopathy 2018    Anemia 07/10/2018    Vitamin D insufficiency 07/10/2018    Hypophosphatemia 2018    Hypokalemia 2018    History of gastric bypass 2018    Migraine without aura and without status migrainosus, not intractable 2018    Left-sided weakness 2018    B12 deficiency 2016    WAGNER (obstructive sleep apnea) 2014    Migraine 2014      LOS (days): 1  Geometric Mean LOS (GMLOS) (days): 2 60  Days to GMLOS:1 7     OBJECTIVE:  Risk of Unplanned Readmission Score: 19         Current admission status: Inpatient   Preferred Pharmacy: [facility], [address] [place] [address]  Phone: [phone] Fax: [phone] [facility] [address]  Phone: [phone] Fax: [phone]    Primary Care Provider: Vinay Aggarwal MD    Primary Insurance: [address] [place] [address]  Secondary Insurance:     DISCHARGE DETAILS:    Patient has [facility] nursing visits weekly port care and patient stated she is independent with IV infusions at home

## 2022-03-04 NOTE — UTILIZATION REVIEW
Initial Clinical Review    Admission: Date/Time/Statement:   Admission Orders (From admission, onward)     Ordered        03/03/22 2022  Inpatient Admission  Once                      Orders Placed This Encounter   Procedures    Inpatient Admission     Standing Status:   Standing     Number of Occurrences:   1     Order Specific Question:   Level of Care     Answer:   Med Surg [16]     Order Specific Question:   Estimated length of stay     Answer:   More than 2 Midnights     Order Specific Question:   Certification     Answer:   I certify that inpatient services are medically necessary for this patient for a duration of greater than two midnights  See H&P and MD Progress Notes for additional information about the patient's course of treatment  ED Arrival Information     Expected Arrival Acuity    - 3/3/2022 19:07 Emergent         Means of arrival Escorted by Service Admission type    [facility]         Arrival complaint    potassium 2 1, gets Daily IV infussion        Chief Complaint   Patient presents with    Abnormal Lab     states she had routine blood work today and potassium came back at 2 1  pt c/o vomiting, nausea, chills, and muscle cramps since monday       Initial Presentation: 44year old female to the ED from home with complaints of low potassium, nausea, vomiting, muscle cramping for 3 days  Admitted to inpatient for hypokalemia, hyponatremia, intractable vomiting  H/O Gastric bypass, malabsorption, iron deficiency anemia, h pylori, intussusception, chronic pancreatitis, chronic hypokalemia with a port a cath receiving 80 meq potassium daily  ON arrival, potassium 1 9, sodium 133  GIven IV fluids and IV potassium in the ED  NO EKG changes  Continue to monitor bmp every 8 hours  Date: 3/4    Day 2:  Continues to feel nauseated, tolerating light meal  Continue to monitor BMP, iv fluids       ED Triage Vitals   Temperature Pulse Respirations Blood Pressure SpO2 03/03/22 1915 03/03/22 1915 03/03/22 1915 03/03/22 1915 03/03/22 1915   97 7 °F (36 5 °C) 90 16 132/72 100 %      Temp Source Heart Rate Source Patient Position - Orthostatic VS BP Location FiO2 (%)   03/03/22 1915 03/03/22 1915 03/03/22 1915 03/03/22 1915 --   Temporal Monitor Sitting Left arm       Pain Score       03/03/22 2130       No Pain          Wt Readings from Last 1 Encounters:   03/03/22 56 6 kg (124 lb 12 5 oz)     Additional Vital Signs:   Time Temp Pulse Resp BP MAP (mmHg) SpO2 O2 Device Patient Position - Orthostatic VS   03/04/22 08:17:28 98 8 °F (37 1 °C) 80 18 105/61 76 98 % -- --   03/04/22 0726 -- -- -- -- -- -- None (Room air) --   03/03/22 2130 -- -- -- -- -- -- None (Room air) --   03/03/22 21:19:21 98 2 °F (36 8 °C) 77 18 125/77 93 98 % -- --   03/03/22 2016 -- 72 27 Abnormal  132/72 -- 100 % None (Room air) --   03/03/22 1915 97 7 °F (36 5 °C) 90 16 132/72 -- 100 % None (Room air) Sitting       Pertinent Labs/Diagnostic Test Results:   3/3 EKG: Normal sinus rhythm  Right atrial enlargement  ST & T wave abnormality, consider inferolateral ischemia  Prolonged QT  Abnormal ECG  When compared with ECG of 22-NOV-2021 16:12,  ST now depressed in Inferior leads        Results from last 7 days   Lab Units 03/03/22  1946   WBC Thousand/uL 6 65   HEMOGLOBIN g/dL 14 5   HEMATOCRIT % 40 8   PLATELETS Thousands/uL 253   NEUTROS ABS Thousands/µL 5 03         Results from last 7 days   Lab Units 03/04/22  0402 03/03/22 1946 03/03/22  1545   SODIUM mmol/L 136 133* 132*   POTASSIUM mmol/L 2 3* 1 9* 2 1*   CHLORIDE mmol/L 101 96* 96*   CO2 mmol/L 25 24 26   ANION GAP mmol/L 10 13 10   BUN mg/dL 11 14 15   CREATININE mg/dL 0 88 1 06 1 11   EGFR ml/min/1 73sq m 83 66 63   CALCIUM mg/dL 7 4* 8 2* 8 2*   MAGNESIUM mg/dL  --  2 1  --    PHOSPHORUS mg/dL  --  3 7  --              Results from last 7 days   Lab Units 03/04/22  0402 03/03/22  1946 03/03/22  1545   GLUCOSE RANDOM mg/dL 85 92 135     Results from last 7 days   Lab Units 03/03/22  1946   CK TOTAL U/L 104       ED Treatment:   Medication Administration from 03/03/2022 1906 to 03/03/2022 2116       Date/Time Order Dose Route Action     03/03/2022 1948 lactated ringers bolus 1,000 mL 1,000 mL Intravenous New Bag     03/03/2022 2017 potassium chloride 20 mEq IVPB (premix) 20 mEq Intravenous New Bag        Past Medical History:   Diagnosis Date    H  pylori infection     Hypokalemia     Left lower quadrant abdominal pain 8/17/2020    Migraine     Rectal bleeding     Renal disorder     Rhabdomyolysis      Admitting Diagnosis: Hypokalemia [E87 6]  Abnormal laboratory test result [R89 9]  Age/Sex: 44 y o  female  Admission Orders:  UP and OOB  SCDs  BMP every 8 hours    Scheduled Medications:  AMILoride, 5 mg, Oral, Daily  amitriptyline, 20 mg, Oral, HS  escitalopram, 20 mg, Oral, HS  [START ON 3/5/2022] ferrous sulfate, 325 mg, Oral, Every Other Day  methocarbamol, 500 mg, Oral, BID  pancrelipase (Lip-Prot-Amyl), 6,000 Units, Oral, TID With Meals  potassium chloride, 20 mEq, Intravenous, Q2H      Continuous IV Infusions:  sodium chloride 0 9 % with KCl 40 mEq/L, 100 mL/hr, Intravenous, Continuous      PRN Meds:  acetaminophen, 650 mg, Oral, Q6H PRN  ondansetron, 4 mg, Intravenous, Q8H PRNx2  promethazine, 25 mg, Intravenous, Q6H PRN        IP CONSULT TO NEPHROLOGY    Network Utilization Review Department  ATTENTION: Please call with any questions or concerns to [phone] and carefully listen to the prompts so that you are directed to the right person  All voicemails are confidential   Carrie Gregorio all requests for admission clinical reviews, approved or denied determinations and any other requests to dedicated fax number below belonging to the campus where the patient is receiving treatment   List of dedicated fax numbers for the Facilities:  [address] [place] [address] DENIALS (Administrative/Medical Necessity) [phone]   [address] [place] [address] (Maternity/NICU/Pediatrics) 261 [facility] 40 125 [facility]  [phone]   [address] [place] [address] 150 [facility] Isela Bandar Nimesh [phone] [place] [address] Kevon Hu 1481 [address] [place] [address] [phone]

## 2022-03-04 NOTE — H&P
[address] Angela Guerra  H&P- La Jones 1985, 44 y o  female MRN: [id number]  Unit/Bed#: -01 Encounter: [id number]  Primary Care Provider: Mahin Nuñez MD   Date and time admitted to hospital: 3/3/2022  7:10 PM    * Hypokalemia  Assessment & Plan  · History chronic hypokalemia managed by Nephrology as outpatient  · Infuses 80 mEq potassium daily through Port-A-Cath to maintain adequate potassium levels  · Has been admitted to hospital multiple times over the past few years  · Has been ill with vomiting since Monday-suspected gastroenteritis  · Potassium 1 9 in ER-admitted for telemetry and potassium repletion  · Magnesium normal  · Appreciate nephrology consultation  · Continue to replete potassium and monitor    Acute hyponatremia  Assessment & Plan  · Sodium 132 on admission  · Hypovolemia hyponatremia  · Trend with volume repletion    Intractable vomiting  Assessment & Plan  · Reports intractable vomiting since Monday-suspected gastroenteritis  · Zofran ODT at home without improvement  · Continue antiemetics and monitor  · IV hydration  · Advanced diet as tolerated    Iron deficiency  Assessment & Plan  Continue PTA ferrous sulfate when tolerating oral intake    Chronic pancreatitis (HCC)  Assessment & Plan  · Continue Pancrease with meals  · Low-fat diet    VTE Pharmacologic Prophylaxis: VTE Score: 0 Low Risk (Score 0-2) - Encourage Ambulation  Code Status: Level 1 - Full Code     Anticipated Length of Stay: Patient will be admitted on an observation basis with an anticipated length of stay of less than 2 midnights secondary to hypokalemia  Total Time for Visit, including Counseling / Coordination of Care: 30 minutes Greater than 50% of this total time spent on direct patient counseling and coordination of care      Chief Complaint: muscle aches    History of Present Illness:  La Jones is a 44 y o  female with a PMH of gastric bypass surgery, malabsorption syndrome, iron deficiency anemia, H pylori, intussusception, chronic pancreatitis, chronic hypokalemia and has a Port-A-Cath receiving 80 mEq potassium daily to maintain levels who presents with vomiting over the past few days and intense myalgias  Outpatient laboratory studies ordered by Nephrology revealed hypokalemia a patient was evaluated in the emergency department  Potassium was 1 9 in the emergency department without significant EKG changes the patient was admitted to hospitalist service on telemetry for further repletion and management of emesis  Review of Systems:  Review of Systems   Constitutional: Positive for activity change, appetite change and fatigue  Negative for chills and fever  HENT: Negative for ear pain and sore throat  Eyes: Negative for pain and visual disturbance  Respiratory: Negative for cough and shortness of breath  Cardiovascular: Negative for chest pain and palpitations  Gastrointestinal: Positive for nausea and vomiting  Negative for abdominal pain  Genitourinary: Negative for dysuria and hematuria  Musculoskeletal: Positive for myalgias  Negative for arthralgias and back pain  Skin: Negative for color change and rash  Neurological: Positive for weakness  Negative for seizures and syncope  All other systems reviewed and are negative        Past Medical and Surgical History:   Past Medical History:   Diagnosis Date    H  pylori infection     Hypokalemia     Left lower quadrant abdominal pain 2020    Migraine     Rectal bleeding     Renal disorder     Rhabdomyolysis        Past Surgical History:   Procedure Laterality Date    ABDOMINAL SURGERY      APPENDECTOMY       SECTION      CHOLECYSTECTOMY      COSMETIC SURGERY      "tummy tuck"    FL GUIDED CENTRAL VENOUS ACCESS DEVICE INSERTION  2018    GASTRIC BYPASS      HYSTERECTOMY      LAPAROSCOPY      AL SURG DIAGNOSTIC EXAM, ANORECTAL N/A 2021    Procedure: ANAL EXAM UNDER ANESTHESIA; HEMORRHOIDECTOMY;  Surgeon: Ivette Carmichael DO;  Location:  MAIN OR;  Service: General    TUNNELED VENOUS PORT PLACEMENT N/A 12/21/2018    Procedure: INSERTION VENOUS PORT (PORT-A-CATH); Surgeon: Per Retana DO;  Location: [facility];  Service: General       Meds/Allergies:  Prior to Admission medications    Medication Sig Start Date End Date Taking?  Authorizing Provider   AMILoride 5 mg tablet Take 1 tablet (5 mg total) by mouth daily  Patient not taking: Reported on 1/4/2022 2/20/19   Adebayo Carr DO   amitriptyline (ELAVIL) 10 mg tablet Take 10 mg by mouth daily at bedtime  Patient not taking: Reported on 1/4/2022     Historical Provider, MD   B Complex Vitamins (VITAMIN B COMPLEX PO) Take 1 capsule by mouth daily   Patient not taking: Reported on 1/4/2022     Historical Provider, MD   Catheters MISC by Does not apply route 2 (two) times a week Port care per [place] protocol  Patient not taking: Reported on 1/4/2022 5/7/20   Froilan Henning DO   cyanocobalamin 1,000 mcg/mL Inject 1 mL (1,000 mcg total) into a muscle every 30 (thirty) days 5/4/20   Froilan Henning DO   ergocalciferol (ERGOCALCIFEROL) 1 25 MG (34437 UT) capsule Take 1 capsule (50,000 Units total) by mouth once a week Mondays and Thursdays  Patient not taking: Reported on 1/4/2022 9/17/21   Froilan Henning DO   escitalopram (LEXAPRO) 10 mg tablet Take 20 mg by mouth daily at bedtime     Historical Provider, MD   ferrous sulfate 325 (65 Fe) mg tablet Take 325 mg by mouth every other day Last took 3/9/20  Patient not taking: Reported on 1/4/2022     Historical Provider, MD   lidocaine (Lidoderm) 5 % Apply 1 patch topically daily for 7 days Remove & Discard patch within 12 hours or as directed by MD 11/20/21 11/27/21  Ally Wheeler MD   methocarbamol (ROBAXIN) 500 mg tablet Take 1 tablet (500 mg total) by mouth 2 (two) times a day  Patient not taking: Reported on 1/4/2022 11/20/21   Ally Wheeler MD ondansetron (ZOFRAN) 4 mg tablet Take 1 tablet (4 mg total) by mouth every 6 (six) hours as needed for nausea or vomiting  Patient not taking: Reported on 1/4/2022 11/6/21   Harpal Forte DO   oxyCODONE (ROXICODONE) 5 immediate release tablet Take 1 tablet (5 mg total) by mouth every 6 (six) hours as needed for moderate pain for up to 6 dosesMax Daily Amount: 20 mg  Patient not taking: Reported on 1/4/2022 11/6/21   Harpal Forte DO   pancrelipase, Lip-Prot-Amyl, (Creon) 6,000 units delayed release capsule Take 6,000 units of lipase by mouth 3 (three) times a day with meals  Patient not taking: Reported on 1/4/2022     Historical Provider, MD   potassium chloride 0 4 mEq/mL Infuse 200 mL (80 mEq total) into a venous catheter daily 80 meq in 1 litre bag to be infused daily over 8 hours 5/11/21   Luis Cárdenas DO   Syringe/Needle, Disp, (SYRINGE 3CC/25GX5/8") 25G X 5/8" 3 ML MISC Use once monthly for B12 injection  6/1/20   Luis Cárdenas DO     I have reviewed home medications with patient personally  Allergies: Allergies   Allergen Reactions    Nsaids Other (See Comments)     Other reaction(s): Other (Please comment)  Should not take s/p bariatric surgery  Other reaction(s):  Other (See Comments)  Should not take as per prior records  Should not take s/p bariatric surgery  Has hx of gastric bypass      Prednisone      Nausea, vomiting, diarrhea       Social History:  Marital Status: /Civil Union   Patient Pre-hospital Living Situation: Home  Patient Pre-hospital Level of Mobility: walks  Patient Pre-hospital Diet Restrictions:  Low-fat  Substance Use History:   Social History     Substance and Sexual Activity   Alcohol Use Never    Alcohol/week: 0 0 standard drinks    Comment: 0     Social History     Tobacco Use   Smoking Status Never Smoker   Smokeless Tobacco Never Used     Social History     Substance and Sexual Activity   Drug Use Never       Family History:  Family History Problem Relation Age of Onset    No Known Problems Mother     Hypertension Father     Diabetes Father     Diabetes Maternal Grandfather        Physical Exam:     Vitals:   Blood Pressure: 125/77 (03/03/22 2119)  Pulse: 77 (03/03/22 2119)  Temperature: 98 2 °F (36 8 °C) (03/03/22 2119)  Temp Source: Temporal (03/03/22 1915)  Respirations: 18 (03/03/22 2119)  Height: 5' 3" (160 cm) (03/03/22 2119)  Weight - Scale: 56 6 kg (124 lb 12 5 oz) (03/03/22 2119)  SpO2: 98 % (03/03/22 2119)    Physical Exam  Vitals and nursing note reviewed  Constitutional:       General: She is not in acute distress  Appearance: She is well-developed and underweight  She is ill-appearing  HENT:      Head: Normocephalic and atraumatic  Mouth/Throat:      Mouth: Mucous membranes are dry  Eyes:      Conjunctiva/sclera: Conjunctivae normal    Cardiovascular:      Rate and Rhythm: Normal rate and regular rhythm  Heart sounds: No murmur heard  Pulmonary:      Effort: Pulmonary effort is normal  No respiratory distress  Breath sounds: Normal breath sounds  Comments: Port-A-Cath accessed  Abdominal:      Palpations: Abdomen is soft  Tenderness: There is no abdominal tenderness  Musculoskeletal:         General: No swelling or tenderness  Normal range of motion  Cervical back: Neck supple  Skin:     General: Skin is warm and dry  Capillary Refill: Capillary refill takes less than 2 seconds  Neurological:      General: No focal deficit present  Mental Status: She is alert        Comments: Generalized muscle weakness   Psychiatric:         Mood and Affect: Mood normal          Behavior: Behavior normal         Additional Data:     Lab Results:  Results from last 7 days   Lab Units 03/03/22  1946   WBC Thousand/uL 6 65   HEMOGLOBIN g/dL 14 5   HEMATOCRIT % 40 8   PLATELETS Thousands/uL 253   NEUTROS PCT % 75   LYMPHS PCT % 15   MONOS PCT % 8   EOS PCT % 1     Results from last 7 days   Lab Units 03/04/22  0402   SODIUM mmol/L 136   POTASSIUM mmol/L 2 3*   CHLORIDE mmol/L 101   CO2 mmol/L 25   BUN mg/dL 11   CREATININE mg/dL 0 88   ANION GAP mmol/L 10   CALCIUM mg/dL 7 4*   GLUCOSE RANDOM mg/dL 85                         EKG and Other Studies Reviewed on Admission:   · EKG: SR nonspecifice ST changes  ** Please Note: This note has been constructed using a voice recognition system   **

## 2022-03-04 NOTE — PROGRESS NOTES
[address]  Progress Note - Brenham Flow 1985, 44 y o  female MRN: [id number]  Unit/Bed#: -Dot Encounter: [id number]  Primary Care Provider: Spike Macedo MD   Date and time admitted to hospital: 3/3/2022  7:10 PM    * Hypokalemia  Assessment & Plan  · History chronic hypokalemia managed by Nephrology as outpatient  · Infuses 80 mEq potassium daily through Port-A-Cath to maintain adequate potassium levels  · Has been admitted to hospital multiple times over the past few years  · Has been ill with vomiting since Monday-suspected gastroenteritis  · Potassium 1 9 in ER-admitted for telemetry and potassium repletion  · Magnesium normal  · Appreciate nephrology consultation  · Continue to replete potassium and monitor    Acute hyponatremia  Assessment & Plan  · Sodium 132 on admission - resolved  · Hypovolemia hyponatremia  · Trend with volume repletion    Intractable vomiting  Assessment & Plan  · Reports intractable vomiting since Monday-suspected gastroenteritis  · Zofran ODT at home without improvement  · Continue antiemetics and monitor  · IV hydration  · Advanced diet as tolerated    Iron deficiency  Assessment & Plan  Continue PTA ferrous sulfate when tolerating oral intake    Chronic pancreatitis (HCC)  Assessment & Plan  · Continue Pancrease with meals  · Low-fat diet        VTE Pharmacologic Prophylaxis: VTE Score: 0 Low Risk (Score 0-2) - Encourage Ambulation  Patient Centered Rounds: I performed bedside rounds with nursing staff today  Discussions with Specialists or Other Care Team Provider: Nephrology    Education and Discussions with Family / Patient: Discussed with patient at bedside  Time Spent for Care: 30 minutes  More than 50% of total time spent on counseling and coordination of care as described above      Current Length of Stay: 1 day(s)  Current Patient Status: Inpatient   Certification Statement: The patient will continue to require additional inpatient hospital stay due to Continues potassium monitoring, slow advancement of diet  Discharge Plan: Anticipate discharge tomorrow to home  Code Status: Level 1 - Full Code    Subjective:   Patient seen and examined at bedside  Endorses that she still feeling nauseated however she is able to tolerate a light meal   Denies any more episodes of vomiting  Still has abdominal soreness from all the retching and vomiting  Denies any palpitations, chest pain, lightheadedness  Objective:     Vitals:   Temp (24hrs), Av 3 °F (36 8 °C), Min:97 7 °F (36 5 °C), Max:98 8 °F (37 1 °C)    Temp:  [97 7 °F (36 5 °C)-98 8 °F (37 1 °C)] 98 3 °F (36 8 °C)  HR:  [72-90] 74  Resp:  [16-27] 17  BP: (105-132)/(61-77) 107/61  SpO2:  [98 %-100 %] 98 %  Body mass index is 22 1 kg/m²  Input and Output Summary (last 24 hours): Intake/Output Summary (Last 24 hours) at 3/4/2022 1513  Last data filed at 3/4/2022 0735  Gross per 24 hour   Intake 963 33 ml   Output --   Net 963 33 ml       Physical Exam:   Physical Exam  Vitals and nursing note reviewed  Constitutional:       General: She is not in acute distress  Appearance: She is well-developed  HENT:      Head: Normocephalic and atraumatic  Eyes:      Conjunctiva/sclera: Conjunctivae normal    Cardiovascular:      Rate and Rhythm: Normal rate and regular rhythm  Heart sounds: No murmur heard  Pulmonary:      Effort: Pulmonary effort is normal  No respiratory distress  Breath sounds: Normal breath sounds  Abdominal:      Palpations: Abdomen is soft  Tenderness: There is no abdominal tenderness  Musculoskeletal:      Cervical back: Neck supple  Skin:     General: Skin is warm and dry  Neurological:      Mental Status: She is alert              Additional Data:     Labs:  Results from last 7 days   Lab Units 22  1946   WBC Thousand/uL 6 65   HEMOGLOBIN g/dL 14 5   HEMATOCRIT % 40 8   PLATELETS Thousands/uL 253   NEUTROS PCT % 75   LYMPHS PCT % 15   MONOS PCT % 8   EOS PCT % 1     Results from last 7 days   Lab Units 03/04/22  1349   SODIUM mmol/L 137   POTASSIUM mmol/L 3 6   CHLORIDE mmol/L 107   CO2 mmol/L 22   BUN mg/dL 14   CREATININE mg/dL 1 19   ANION GAP mmol/L 8   CALCIUM mg/dL 7 3*   GLUCOSE RANDOM mg/dL 93                       Lines/Drains:  Invasive Devices  Report    Central Venous Catheter Line            Port A Cath 12/22/18 Right Chest 1168 days                Central Line:  Goal for removal: N/A - Chronic PICC         Telemetry:  Telemetry Orders (From admission, onward)             24 Hour Telemetry Monitoring  Continuous x 24 Hours (Telem)        Expiring   References:    Telemetry Guidelines   Question:  Reason for 24 Hour Telemetry  Answer:  Metabolic/Electrolyte Disturbance with High Probability of Dysrhythmia (K level <3 or >6, or KCL infusion >=10mEq/hr)                 Telemetry Reviewed: Normal Sinus Rhythm  Indication for Continued Telemetry Use: Arrthymias requiring medical therapy             Imaging:  Reviewed    Recent Cultures (last 7 days):         Last 24 Hours Medication List:   Current Facility-Administered Medications   Medication Dose Route Frequency Provider Last Rate    acetaminophen  650 mg Oral Q6H PRN Zakiya S Katey, CRNP      AMILoride  5 mg Oral Daily Zakiya S Katey, CRNP      amitriptyline  20 mg Oral HS Zakiya S Katey, CRNP      escitalopram  20 mg Oral HS Zakiya S Katey, CRNP      [START ON 3/5/2022] ferrous sulfate  325 mg Oral Every Other Day Zakiya S Katey, CRNP      methocarbamol  500 mg Oral BID Zakiya S Katey, CRNP      ondansetron  4 mg Intravenous Q8H PRN Zakiya S Katey, CRNP      pancrelipase (Lip-Prot-Amyl)  6,000 Units Oral TID With Meals Zakiya S Katey, CRNP      promethazine  25 mg Intravenous Q6H PRN Zakyia S Katey, CRNP      sodium chloride 0 9 % with KCl 40 mEq/L  100 mL/hr Intravenous Continuous Zakiya S Katey, CRNP 100 mL/hr (03/04/22 0735)        Today, Patient Was Seen By: Sg Bloom Janny Vela MD    **Please Note: This note may have been constructed using a voice recognition system  **

## 2022-03-04 NOTE — ASSESSMENT & PLAN NOTE
· Reports intractable vomiting since Monday-suspected gastroenteritis  · Zofran ODT at home without improvement  · Continue antiemetics and monitor  · IV hydration  · Advanced diet as tolerated

## 2022-03-04 NOTE — ASSESSMENT & PLAN NOTE
· History chronic hypokalemia managed by Nephrology as outpatient  · Infuses 80 mEq potassium daily through Port-A-Cath to maintain adequate potassium levels  · Has been admitted to hospital multiple times over the past few years  · Has been ill with vomiting since Monday-suspected gastroenteritis  · Potassium 1 9 in ER-admitted for telemetry and potassium repletion  · Magnesium normal  · Appreciate nephrology consultation  · Continue to replete potassium and monitor

## 2022-03-04 NOTE — PLAN OF CARE
Problem: MOBILITY - ADULT  Goal: Maintain or return to baseline ADL function  Description: INTERVENTIONS:  -  Assess patient's ability to carry out ADLs; assess patient's baseline for ADL function and identify physical deficits which impact ability to perform ADLs (bathing, care of mouth/teeth, toileting, grooming, dressing, etc )  - Assess/evaluate cause of self-care deficits   - Assess range of motion  - Assess patient's mobility; develop plan if impaired  - Assess patient's need for assistive devices and provide as appropriate  - Encourage maximum independence but intervene and supervise when necessary  - Involve family in performance of ADLs  - Assess for home care needs following discharge   - Consider OT consult to assist with ADL evaluation and planning for discharge  - Provide patient education as appropriate  Outcome: Progressing  Goal: Maintains/Returns to pre admission functional level  Description: INTERVENTIONS:  - Perform BMAT or MOVE assessment daily    - Set and communicate daily mobility goal to care team and patient/family/caregiver  - Collaborate with rehabilitation services on mobility goals if consulted  - Perform Range of Motion 3 times a day  - Reposition patient every 3 hours    - Dangle patient 3 times a day  - Stand patient 3 times a day  - Ambulate patient 3 times a day  - Out of bed to chair 3 times a day   - Out of bed for meals 3 times a day  - Out of bed for toileting  - Record patient progress and toleration of activity level   Outcome: Progressing

## 2022-03-04 NOTE — CONSULTS
Consultation - Nephrology   Rowe Dakin 44 y o  female MRN: [id number]  Unit/Bed#: -01 Encounter: [id number]      Assessment and Plan    1  Hypokalemia  · Extensive past workup  Followed by Dr Yesika Davies  · Acute worsening in the setting of intractable vomiting and malabsorption  Patient does not respond well to oral repletion  Will continue repletion through Port-A-Cath to goal potassium 4 0 millimole per L   · Patient is stable for discharge following resolution of GI losses and potassium approaching goal   She may be discharged on her home 80 mEq day via Port-A-Cath  2  Hypovolemic hyponatremia  · In the setting of GI losses  Volume expand  3  Intractable vomiting  · Reports improvement with Zofran  4  Chronic pancreatitis  · Management per primary  5  Chronic kidney disease, stage IIIA with baseline creatinine 1 1 mg/dL  · No evidence of acute kidney injury  Thank you for allowing us to participate in the care of your patient  Please feel free to contact us regarding the care of this patient, or any other questions/concerns that may be applicable      Patient Active Problem List   Diagnosis    Hypokalemia    History of gastric bypass    Migraine without aura and without status migrainosus, not intractable    Left-sided weakness    Hypophosphatemia    Anemia    Vitamin D insufficiency    Elevated CPK    Vitamin B12 deficiency    Cervical lymphadenopathy    Fatigue    Paresthesia of both lower extremities    Paresthesias    B12 deficiency    Gastric bypass status for obesity    GERD (gastroesophageal reflux disease)    Migraine    WAGNER (obstructive sleep apnea)    Other intestinal malabsorption    Right ovarian cyst    Chest pain    Nausea & vomiting    Vertigo    Stress fracture of right foot with routine healing    Normal anion gap metabolic acidosis    Middle ear effusion    Left lower quadrant abdominal pain    Blood creatinine increased compared with prior measurement    Gas pain    Acute on chronic abdominal pain    Cellulitis of chest wall    Chronic pancreatitis (HCC)    Open wound / skin tear of right chest wall    Other hemorrhoids    Iron deficiency    Intractable vomiting    Acute hyponatremia       History of Present Illness     Physician Requesting Consult: Angela Lorenz *    Reason for Consult / Principal Problem:  Hypokalemia    Hx and PE limited by:  None    HPI: Eveline Chandra is a 44y o  year old female with history of bariatric surgery and chronic hypokalemia who chronically receives potassium chloride 80 mEq via Port-A-Cath daily and takes amiloride 5 mg daily who presents to [facility] Emergency Department with presenting potassium 1 9 millimole per L in the setting of intractable vomiting  nephrology is consulted for evaluation and management of hypokalemia  Patient has undergone extensive workup and was even seen by [facility]  Upon speaking with the patient, she reports adherence with routine potassium repletion  She reports no further vomiting since admission  History obtained from chart review and the patient    Review of Systems    Fatigue, weakness  Denies further vomiting, diarrhea  A complete 10 point review of systems was performed and is otherwise negative         Historical Information   Past Medical History:   Diagnosis Date    H  pylori infection     Hypokalemia     Left lower quadrant abdominal pain 2020    Migraine     Rectal bleeding     Renal disorder     Rhabdomyolysis      Past Surgical History:   Procedure Laterality Date    ABDOMINAL SURGERY      APPENDECTOMY       SECTION      CHOLECYSTECTOMY      COSMETIC SURGERY      "tummy tuck"    FL GUIDED CENTRAL VENOUS ACCESS DEVICE INSERTION  2018    GASTRIC BYPASS      HYSTERECTOMY      LAPAROSCOPY      CA SURG DIAGNOSTIC EXAM, ANORECTAL N/A 2021    Procedure: ANAL EXAM UNDER ANESTHESIA; HEMORRHOIDECTOMY;  Surgeon: Fredrick Blanton DO;  Location:  MAIN OR;  Service: General    TUNNELED VENOUS PORT PLACEMENT N/A 12/21/2018    Procedure: INSERTION VENOUS PORT (PORT-A-CATH);   Surgeon: Yusuf Dinh DO;  Location: [facility];  Service: General     Social History   Social History     Substance and Sexual Activity   Alcohol Use Never    Alcohol/week: 0 0 standard drinks    Comment: 0     Social History     Substance and Sexual Activity   Drug Use Never     Social History     Tobacco Use   Smoking Status Never Smoker   Smokeless Tobacco Never Used     Family History   Problem Relation Age of Onset    No Known Problems Mother     Hypertension Father     Diabetes Father     Diabetes Maternal Grandfather        Meds/Allergies   all current active meds have been reviewed, current meds:   Current Facility-Administered Medications   Medication Dose Route Frequency    acetaminophen (TYLENOL) tablet 650 mg  650 mg Oral Q6H PRN    AMILoride tablet 5 mg  5 mg Oral Daily    amitriptyline (ELAVIL) tablet 20 mg  20 mg Oral HS    escitalopram (LEXAPRO) tablet 20 mg  20 mg Oral HS    [START ON 3/5/2022] ferrous sulfate tablet 325 mg  325 mg Oral Every Other Day    methocarbamol (ROBAXIN) tablet 500 mg  500 mg Oral BID    ondansetron (ZOFRAN) injection 4 mg  4 mg Intravenous Q8H PRN    pancrelipase (Lip-Prot-Amyl) (CREON) delayed release capsule 6,000 Units  6,000 Units Oral TID With Meals    promethazine (PHENERGAN) injection 25 mg  25 mg Intravenous Q6H PRN    sodium chloride 0 9 % with KCl 40 mEq/L infusion (premix)  100 mL/hr Intravenous Continuous    and PTA meds:    Medications Prior to Admission   Medication    AMILoride 5 mg tablet    amitriptyline (ELAVIL) 10 mg tablet    B Complex Vitamins (VITAMIN B COMPLEX PO)    cyanocobalamin 1,000 mcg/mL    ergocalciferol (ERGOCALCIFEROL) 1 25 MG (67616 UT) capsule    escitalopram (LEXAPRO) 10 mg tablet    ferrous sulfate 325 (65 Fe) mg tablet    methocarbamol (ROBAXIN) 500 mg tablet    ondansetron (ZOFRAN) 4 mg tablet    pancrelipase, Lip-Prot-Amyl, (Creon) 6,000 units delayed release capsule    potassium chloride 0 4 mEq/mL    Catheters MISC    lidocaine (Lidoderm) 5 %    oxyCODONE (ROXICODONE) 5 immediate release tablet    Syringe/Needle, Disp, (SYRINGE 3CC/25GX5/8") 25G X 5/8" 3 ML MISC         Allergies   Allergen Reactions    Nsaids Other (See Comments)     Other reaction(s): Other (Please comment)  Should not take s/p bariatric surgery  Other reaction(s): Other (See Comments)  Should not take as per prior records  Should not take s/p bariatric surgery  Has hx of gastric bypass      Prednisone      Nausea, vomiting, diarrhea       Objective     Intake/Output Summary (Last 24 hours) at 3/4/2022 1554  Last data filed at 3/4/2022 0735  Gross per 24 hour   Intake 963 33 ml   Output --   Net 963 33 ml       Invasive Devices:        Physical Exam  Vitals and nursing note reviewed  Exam conducted with a chaperone present  Constitutional:       General: She is not in acute distress  Appearance: Normal appearance  She is normal weight  She is not ill-appearing or toxic-appearing  HENT:      Head: Normocephalic and atraumatic  Nose: Nose normal  No congestion or rhinorrhea  Mouth/Throat:      Mouth: Mucous membranes are moist       Pharynx: Oropharynx is clear  Eyes:      General: No scleral icterus  Right eye: No discharge  Left eye: No discharge  Extraocular Movements: Extraocular movements intact  Conjunctiva/sclera: Conjunctivae normal       Pupils: Pupils are equal, round, and reactive to light  Cardiovascular:      Rate and Rhythm: Normal rate and regular rhythm  Pulses: Normal pulses  Heart sounds: Normal heart sounds  No murmur heard  Pulmonary:      Effort: Pulmonary effort is normal  No respiratory distress  Breath sounds: Normal breath sounds   No wheezing  Chest:       Abdominal:      General: Abdomen is flat  Bowel sounds are normal  There is no distension  Palpations: Abdomen is soft  There is no mass  Tenderness: There is no abdominal tenderness  Musculoskeletal:         General: No swelling or deformity  Normal range of motion  Cervical back: Normal range of motion and neck supple  No rigidity or tenderness  Skin:     General: Skin is warm and dry  Coloration: Skin is not jaundiced or pale  Findings: No bruising  Neurological:      General: No focal deficit present  Mental Status: She is alert and oriented to person, place, and time  Mental status is at baseline  Psychiatric:         Mood and Affect: Mood normal          Behavior: Behavior normal          Thought Content: Thought content normal          Judgment: Judgment normal            No intake/output data recorded  Vitals:    03/04/22 1507   BP: 107/61   Pulse: 74   Resp: 17   Temp: 98 3 °F (36 8 °C)   SpO2: 98%         Current Weight: Weight - Scale: 56 6 kg (124 lb 12 5 oz)  First Weight: Weight - Scale: 56 7 kg (125 lb)    Lab Results:  I have personally reviewed pertinent labs      CBC:   Lab Results   Component Value Date    WBC 6 65 03/03/2022    HGB 14 5 03/03/2022    HCT 40 8 03/03/2022    MCV 83 03/03/2022     03/03/2022    MCH 29 7 03/03/2022    MCHC 35 5 03/03/2022    RDW 14 5 03/03/2022    MPV 9 5 03/03/2022    NRBC 0 03/03/2022     CMP:   Lab Results   Component Value Date    K 3 6 03/04/2022     03/04/2022    CO2 22 03/04/2022    BUN 14 03/04/2022    CREATININE 1 19 03/04/2022    CALCIUM 7 3 (L) 03/04/2022    EGFR 58 03/04/2022     Phosphorus:   Lab Results   Component Value Date    PHOS 3 7 03/03/2022     Magnesium:   Lab Results   Component Value Date    MG 2 1 03/03/2022     Urinalysis: No results found for: Emi Stain, SPECGRAV, PHUR, LEUKOCYTESUR, NITRITE, PROTEINUA, GLUCOSEU, KETONESU, BILIRUBINUR, BLOODU  Ionized Calcium: No results found for: CAION  Coagulation: No results found for: PT, INR, APTT  Troponin: No results found for: TROPONINI  ABG: No results found for: PHART, ATP3PJQ, PO2ART, HHN4GYC, P0ZYOYKW, BEART, SOURCE    Results from last 7 days   Lab Units 03/04/22  1349 03/04/22  0402 03/03/22  1946   POTASSIUM mmol/L 3 6 2 3* 1 9*   CHLORIDE mmol/L 107 101 96*   CO2 mmol/L 22 25 24   BUN mg/dL 14 11 14   CREATININE mg/dL 1 19 0 88 1 06   CALCIUM mg/dL 7 3* 7 4* 8 2*       Radiology review:  No results found  Fredy Toth PA-C      Portions of the record may have been created with voice recognition software  Occasional wrong word or "sound a like" substitutions may have occurred due to the inherent limitations of voice recognition software  Read the chart carefully and recognize, using context, where substitutions have occurred

## 2022-03-04 NOTE — ED PROVIDER NOTES
History  Chief Complaint   Patient presents with    Abnormal Lab     states she had routine blood work today and potassium came back at 2 1  pt c/o vomiting, nausea, chills, and muscle cramps since monday       History provided by:  Patient  Medical Problem  Location:  Diffuse muscle aches  Quality:  Crampy  Severity:  Moderate  Onset quality:  Gradual  Duration:  3 days  Timing:  Constant  Progression:  Worsening  Chronicity:  Recurrent  Context:  Hypokalemia  Ineffective treatments:  IV potassium  Associated symptoms: diarrhea, fatigue, myalgias, nausea and vomiting    Associated symptoms: no abdominal pain, no chest pain, no congestion, no cough, no fever, no headaches, no rash, no rhinorrhea, no shortness of breath, no sore throat and no wheezing       44year old F  Presents to the ED with hypokalemia  Has been having worsening numbness/tingling in her fingers, toes, around the mouth  Expresses moderate muscle aches  Has been having decreased oral intake with nausea/vomiting x 3 days  The patient had blood work this afternoon--K 2 1  PCP recommended ED evaluation  The patient has a port and infuses KCl 80 mEq daily to maintain K levels  Prior to Admission Medications   Prescriptions Last Dose Informant Patient Reported? Taking? AMILoride 5 mg tablet  Self No No   Sig: Take 1 tablet (5 mg total) by mouth daily   Patient not taking: Reported on 1/4/2022    B Complex Vitamins (VITAMIN B COMPLEX PO)   Yes No   Sig: Take 1 capsule by mouth daily    Patient not taking: Reported on 1/4/2022    Catheters MISC   No No   Sig: by Does not apply route 2 (two) times a week Port care per [facility] protocol   Patient not taking: Reported on 1/4/2022    Syringe/Needle, Disp, (SYRINGE 3CC/25GX5/8") 25G X 5/8" 3 ML MISC   No No   Sig: Use once monthly for B12 injection     amitriptyline (ELAVIL) 10 mg tablet   Yes No   Sig: Take 10 mg by mouth daily at bedtime   Patient not taking: Reported on 1/4/2022    cyanocobalamin 1,000 mcg/mL   No No   Sig: Inject 1 mL (1,000 mcg total) into a muscle every 30 (thirty) days   ergocalciferol (ERGOCALCIFEROL) 1 25 MG (04697 UT) capsule   No No   Sig: Take 1 capsule (50,000 Units total) by mouth once a week Mondays and Thursdays   Patient not taking: Reported on 1/4/2022    escitalopram (LEXAPRO) 10 mg tablet  Self Yes No   Sig: Take 20 mg by mouth daily at bedtime    ferrous sulfate 325 (65 Fe) mg tablet   Yes No   Sig: Take 325 mg by mouth every other day Last took 3/9/20   Patient not taking: Reported on 1/4/2022    lidocaine (Lidoderm) 5 %   No No   Sig: Apply 1 patch topically daily for 7 days Remove & Discard patch within 12 hours or as directed by MD   methocarbamol (ROBAXIN) 500 mg tablet   No No   Sig: Take 1 tablet (500 mg total) by mouth 2 (two) times a day   Patient not taking: Reported on 1/4/2022    ondansetron (ZOFRAN) 4 mg tablet   No No   Sig: Take 1 tablet (4 mg total) by mouth every 6 (six) hours as needed for nausea or vomiting   Patient not taking: Reported on 1/4/2022    oxyCODONE (ROXICODONE) 5 immediate release tablet   No No   Sig: Take 1 tablet (5 mg total) by mouth every 6 (six) hours as needed for moderate pain for up to 6 dosesMax Daily Amount: 20 mg   Patient not taking: Reported on 1/4/2022    pancrelipase, Lip-Prot-Amyl, (Creon) 6,000 units delayed release capsule   Yes No   Sig: Take 6,000 units of lipase by mouth 3 (three) times a day with meals   Patient not taking: Reported on 1/4/2022    potassium chloride 0 4 mEq/mL   No No   Sig: Infuse 200 mL (80 mEq total) into a venous catheter daily 80 meq in 1 litre bag to be infused daily over 8 hours      Facility-Administered Medications: None       Past Medical History:   Diagnosis Date    H  pylori infection     Hypokalemia     Left lower quadrant abdominal pain 8/17/2020    Migraine     Rectal bleeding     Renal disorder     Rhabdomyolysis        Past Surgical History:   Procedure Laterality Date    ABDOMINAL SURGERY      APPENDECTOMY       SECTION      CHOLECYSTECTOMY      COSMETIC SURGERY      "tummy tuck"    [place] GUIDED CENTRAL VENOUS ACCESS DEVICE INSERTION  2018    GASTRIC BYPASS      HYSTERECTOMY      LAPAROSCOPY      OK SURG DIAGNOSTIC EXAM, ANORECTAL N/A 2021    Procedure: ANAL EXAM UNDER ANESTHESIA; HEMORRHOIDECTOMY;  Surgeon: Miriam Robison DO;  Location:  MAIN OR;  Service: General    TUNNELED VENOUS PORT PLACEMENT N/A 2018    Procedure: INSERTION VENOUS PORT (PORT-A-CATH); Surgeon: Elias Ureña DO;  Location: [facility];  Service: General       Family History   Problem Relation Age of Onset    No Known Problems Mother     Hypertension Father     Diabetes Father     Diabetes Maternal Grandfather      I have reviewed and agree with the history as documented  E-Cigarette/Vaping    E-Cigarette Use Never User      E-Cigarette/Vaping Substances    Nicotine No     THC No     CBD No      Social History     Tobacco Use    Smoking status: Never Smoker    Smokeless tobacco: Never Used   Vaping Use    Vaping Use: Never used   Substance Use Topics    Alcohol use: Never     Alcohol/week: 0 0 standard drinks     Comment: 0    Drug use: Never       Review of Systems   Constitutional: Positive for activity change, appetite change and fatigue  Negative for chills, diaphoresis and fever  HENT: Negative for congestion, rhinorrhea, sinus pressure, sinus pain and sore throat  Eyes: Negative for pain and visual disturbance  Respiratory: Negative for cough, chest tightness, shortness of breath and wheezing  Cardiovascular: Negative for chest pain, palpitations and leg swelling  Gastrointestinal: Positive for diarrhea, nausea and vomiting  Negative for abdominal pain  Genitourinary: Positive for decreased urine volume and difficulty urinating  Negative for dysuria, flank pain and urgency  Musculoskeletal: Positive for arthralgias and myalgias   Negative for back pain and neck pain  Skin: Negative for color change, rash and wound  Neurological: Positive for numbness  Negative for dizziness, syncope, weakness, light-headedness and headaches  Hematological: Does not bruise/bleed easily  Psychiatric/Behavioral: Negative for confusion and sleep disturbance  All other systems reviewed and are negative  Physical Exam  Physical Exam  Vitals and nursing note reviewed  Constitutional:       General: She is not in acute distress  Appearance: She is not ill-appearing or toxic-appearing  HENT:      Head: Normocephalic and atraumatic  Right Ear: External ear normal       Left Ear: External ear normal       Nose: No congestion or rhinorrhea  Mouth/Throat:      Mouth: Mucous membranes are moist       Pharynx: Oropharynx is clear  No oropharyngeal exudate or posterior oropharyngeal erythema  Eyes:      General:         Right eye: No discharge  Left eye: No discharge  Extraocular Movements: Extraocular movements intact  Conjunctiva/sclera: Conjunctivae normal    Cardiovascular:      Rate and Rhythm: Normal rate and regular rhythm  Pulses: Normal pulses  Heart sounds: Normal heart sounds  No murmur heard  Pulmonary:      Effort: Pulmonary effort is normal  No respiratory distress  Breath sounds: Normal breath sounds  No stridor  No wheezing, rhonchi or rales  Chest:      Chest wall: No tenderness  Abdominal:      General: Abdomen is flat  Bowel sounds are normal  There is no distension  Palpations: Abdomen is soft  There is no mass  Tenderness: There is no abdominal tenderness  There is no right CVA tenderness, left CVA tenderness, guarding or rebound  Hernia: No hernia is present  Musculoskeletal:         General: No swelling, tenderness, deformity or signs of injury  Cervical back: Neck supple  No tenderness  Right lower leg: No edema  Left lower leg: No edema  Comments: Port located in right chest   Skin:     General: Skin is warm and dry  Capillary Refill: Capillary refill takes less than 2 seconds  Coloration: Skin is not jaundiced or pale  Findings: No bruising, erythema or rash  Neurological:      General: No focal deficit present  Mental Status: She is alert and oriented to person, place, and time  Cranial Nerves: No cranial nerve deficit  Sensory: No sensory deficit  Motor: No weakness  Psychiatric:         Mood and Affect: Mood normal          Behavior: Behavior normal          Thought Content:  Thought content normal          Judgment: Judgment normal        Vital Signs  ED Triage Vitals [03/03/22 1915]   Temperature Pulse Respirations Blood Pressure SpO2   97 7 °F (36 5 °C) 90 16 132/72 100 %      Temp Source Heart Rate Source Patient Position - Orthostatic VS BP Location FiO2 (%)   Temporal Monitor Sitting Left arm --      Pain Score       --           Vitals:    03/03/22 1915   BP: 132/72   Pulse: 90   Patient Position - Orthostatic VS: Sitting     ED Medications  Medications   lactated ringers bolus 1,000 mL (1,000 mL Intravenous New Bag 3/3/22 1948)   sodium chloride 0 9 % with KCl 40 mEq/L infusion (premix) (has no administration in time range)     Diagnostic Studies  Results Reviewed     Procedure Component Value Units Date/Time    Basic metabolic panel [[id number]]  (Abnormal) Collected: 03/03/22 1946    Lab Status: Final result Specimen: Blood from Central Venous Line Updated: 03/03/22 2008     Sodium 133 mmol/L      Potassium 1 9 mmol/L      Chloride 96 mmol/L      CO2 24 mmol/L      ANION GAP 13 mmol/L      BUN 14 mg/dL      Creatinine 1 06 mg/dL      Glucose 92 mg/dL      Calcium 8 2 mg/dL      eGFR 66 ml/min/1 73sq m     Narrative:      [place] guidelines for Chronic Kidney Disease (CKD):     Stage 1 with normal or high GFR (GFR > 90 mL/min/1 73 square meters)    Stage 2 Mild CKD (GFR = 60-89 mL/min/1 73 square meters)    Stage 3A Moderate CKD (GFR = 45-59 mL/min/1 73 square meters)    Stage 3B Moderate CKD (GFR = 30-44 mL/min/1 73 square meters)    Stage 4 Severe CKD (GFR = 15-29 mL/min/1 73 square meters)    Stage 5 End Stage CKD (GFR <15 mL/min/1 73 square meters)  Note: GFR calculation is accurate only with a steady state creatinine    Magnesium [[id number]]  (Normal) Collected: 03/03/22 1946    Lab Status: Final result Specimen: Blood from Central Venous Line Updated: 03/03/22 2006     Magnesium 2 1 mg/dL     Phosphorus [[id number]]  (Normal) Collected: 03/03/22 1946    Lab Status: Final result Specimen: Blood from Central Venous Line Updated: 03/03/22 2006     Phosphorus 3 7 mg/dL     CK (with reflex to MB) [[id number]]  (Normal) Collected: 03/03/22 1946    Lab Status: Final result Specimen: Blood from Central Venous Line Updated: 03/03/22 2006     Total  U/L     CBC and differential [[id number]] Collected: 03/03/22 1946    Lab Status: Final result Specimen: Blood from Central Venous Line Updated: 03/03/22 1952     WBC 6 65 Thousand/uL      RBC 4 89 Million/uL      Hemoglobin 14 5 g/dL      Hematocrit 40 8 %      MCV 83 fL      MCH 29 7 pg      MCHC 35 5 g/dL      RDW 14 5 %      MPV 9 5 fL      Platelets 745 Thousands/uL      nRBC 0 /100 WBCs      Neutrophils Relative 75 %      Immat GRANS % 0 %      Lymphocytes Relative 15 %      Monocytes Relative 8 %      Eosinophils Relative 1 %      Basophils Relative 1 %      Neutrophils Absolute 5 03 Thousands/µL      Immature Grans Absolute 0 02 Thousand/uL      Lymphocytes Absolute 1 00 Thousands/µL      Monocytes Absolute 0 50 Thousand/µL      Eosinophils Absolute 0 05 Thousand/µL      Basophils Absolute 0 05 Thousands/µL             No orders to display          Procedures  Procedures     ED Course     MDM     65-year-old female  History of hypokalemia  Infuses 80 mEq of KCl daily  Presents to the emergency department with hypokalemia  Over the last 3 days, the patient has been having increased nausea/vomiting/diarrhea, muscle aches, numbness/tingling along her finger tip/toes/mouth  Labs obtained earlier today showed a potassium of 2 1  Repeat labs show potassium 1 9  Normal magnesium  The patient was administered an IV fluid bolus of lactated Ringer's, IV potassium chloride 40 mEq  Admitted to the hospitalist service for further workup and treatment  The patient was stable while under my care  Disposition  Final diagnoses:   Hypokalemia     Time reflects when diagnosis was documented in both MDM as applicable and the Disposition within this note     Time User Action Codes Description Comment    3/3/2022  8:22 PM Nakul Clark Add [E87 6] Hypokalemia       ED Disposition     ED Disposition Condition Date/Time Comment    Admit Stable Thu Mar 3, 2022  8:22 PM Case was discussed with Zakiya Del Toro and the patient's admission status was agreed to be Admission Status: inpatient status to the service of Dr Antonietta Lentz          Follow-up Information    None         Patient's Medications   Discharge Prescriptions    No medications on file       No discharge procedures on file      PDMP Review       Value Time User    PDMP Reviewed  Yes 4/21/2021  1:04 PM Toney Barreto PA-C          ED Provider  Electronically Signed by           Jordyn Edmodns DO  03/03/22 1327

## 2022-03-04 NOTE — PLAN OF CARE
Problem: MOBILITY - ADULT  Goal: Maintain or return to baseline ADL function  Description: INTERVENTIONS:  -  Assess patient's ability to carry out ADLs; assess patient's baseline for ADL function and identify physical deficits which impact ability to perform ADLs (bathing, care of mouth/teeth, toileting, grooming, dressing, etc )  - Assess/evaluate cause of self-care deficits   - Assess range of motion  - Assess patient's mobility; develop plan if impaired  - Assess patient's need for assistive devices and provide as appropriate  - Encourage maximum independence but intervene and supervise when necessary  - Involve family in performance of ADLs  - Assess for home care needs following discharge   - Consider OT consult to assist with ADL evaluation and planning for discharge  - Provide patient education as appropriate  Outcome: Progressing  Goal: Maintains/Returns to pre admission functional level  Description: INTERVENTIONS:  - Perform BMAT or MOVE assessment daily    - Set and communicate daily mobility goal to care team and patient/family/caregiver  - Collaborate with rehabilitation services on mobility goals if consulted  - Perform Range of Motion     times a day  - Reposition patient every   hours  - Dangle patient   times a day  - Stand patient   times a day  - Ambulate patient   times a day  - Out of bed to chair   times a day   - Out of bed for meals    times a day  - Out of bed for toileting  - Record patient progress and toleration of activity level   Outcome: Progressing

## 2022-03-05 LAB
ANION GAP SERPL CALCULATED.3IONS-SCNC: 10 MMOL/L (ref 4–13)
ANION GAP SERPL CALCULATED.3IONS-SCNC: 7 MMOL/L (ref 4–13)
ANION GAP SERPL CALCULATED.3IONS-SCNC: 8 MMOL/L (ref 4–13)
BUN SERPL-MCNC: 12 MG/DL (ref 5–25)
BUN SERPL-MCNC: 12 MG/DL (ref 5–25)
BUN SERPL-MCNC: 15 MG/DL (ref 5–25)
CALCIUM SERPL-MCNC: 7 MG/DL (ref 8.3–10.1)
CALCIUM SERPL-MCNC: 7.2 MG/DL (ref 8.3–10.1)
CALCIUM SERPL-MCNC: 7.3 MG/DL (ref 8.3–10.1)
CHLORIDE SERPL-SCNC: 107 MMOL/L (ref 100–108)
CHLORIDE SERPL-SCNC: 109 MMOL/L (ref 100–108)
CHLORIDE SERPL-SCNC: 110 MMOL/L (ref 100–108)
CO2 SERPL-SCNC: 21 MMOL/L (ref 21–32)
CO2 SERPL-SCNC: 21 MMOL/L (ref 21–32)
CO2 SERPL-SCNC: 22 MMOL/L (ref 21–32)
CREAT SERPL-MCNC: 0.82 MG/DL (ref 0.6–1.3)
CREAT SERPL-MCNC: 0.88 MG/DL (ref 0.6–1.3)
CREAT SERPL-MCNC: 0.95 MG/DL (ref 0.6–1.3)
ERYTHROCYTE [DISTWIDTH] IN BLOOD BY AUTOMATED COUNT: 15.1 % (ref 11.6–15.1)
GFR SERPL CREATININE-BSD FRML MDRD: 76 ML/MIN/1.73SQ M
GFR SERPL CREATININE-BSD FRML MDRD: 83 ML/MIN/1.73SQ M
GFR SERPL CREATININE-BSD FRML MDRD: 91 ML/MIN/1.73SQ M
GLUCOSE SERPL-MCNC: 79 MG/DL (ref 65–140)
GLUCOSE SERPL-MCNC: 86 MG/DL (ref 65–140)
GLUCOSE SERPL-MCNC: 87 MG/DL (ref 65–140)
HCT VFR BLD AUTO: 32.5 % (ref 34.8–46.1)
HGB BLD-MCNC: 11.2 G/DL (ref 11.5–15.4)
MAGNESIUM SERPL-MCNC: 2.1 MG/DL (ref 1.6–2.6)
MCH RBC QN AUTO: 29.9 PG (ref 26.8–34.3)
MCHC RBC AUTO-ENTMCNC: 34.5 G/DL (ref 31.4–37.4)
MCV RBC AUTO: 87 FL (ref 82–98)
PLATELET # BLD AUTO: 216 THOUSANDS/UL (ref 149–390)
PMV BLD AUTO: 9.9 FL (ref 8.9–12.7)
POTASSIUM SERPL-SCNC: 3.3 MMOL/L (ref 3.5–5.3)
POTASSIUM SERPL-SCNC: 3.6 MMOL/L (ref 3.5–5.3)
POTASSIUM SERPL-SCNC: 4.5 MMOL/L (ref 3.5–5.3)
RBC # BLD AUTO: 3.74 MILLION/UL (ref 3.81–5.12)
SODIUM SERPL-SCNC: 137 MMOL/L (ref 136–145)
SODIUM SERPL-SCNC: 138 MMOL/L (ref 136–145)
SODIUM SERPL-SCNC: 140 MMOL/L (ref 136–145)
WBC # BLD AUTO: 4.97 THOUSAND/UL (ref 4.31–10.16)

## 2022-03-05 PROCEDURE — 85027 COMPLETE CBC AUTOMATED: CPT | Performed by: STUDENT IN AN ORGANIZED HEALTH CARE EDUCATION/TRAINING PROGRAM

## 2022-03-05 PROCEDURE — 80048 BASIC METABOLIC PNL TOTAL CA: CPT | Performed by: STUDENT IN AN ORGANIZED HEALTH CARE EDUCATION/TRAINING PROGRAM

## 2022-03-05 PROCEDURE — 99232 SBSQ HOSP IP/OBS MODERATE 35: CPT | Performed by: STUDENT IN AN ORGANIZED HEALTH CARE EDUCATION/TRAINING PROGRAM

## 2022-03-05 PROCEDURE — 99232 SBSQ HOSP IP/OBS MODERATE 35: CPT | Performed by: INTERNAL MEDICINE

## 2022-03-05 PROCEDURE — 83735 ASSAY OF MAGNESIUM: CPT | Performed by: STUDENT IN AN ORGANIZED HEALTH CARE EDUCATION/TRAINING PROGRAM

## 2022-03-05 RX ORDER — POTASSIUM CHLORIDE 20 MEQ/1
40 TABLET, EXTENDED RELEASE ORAL ONCE
Status: COMPLETED | OUTPATIENT
Start: 2022-03-05 | End: 2022-03-05

## 2022-03-05 RX ORDER — POTASSIUM CHLORIDE 14.9 MG/ML
20 INJECTION INTRAVENOUS 3 TIMES DAILY
Status: DISCONTINUED | OUTPATIENT
Start: 2022-03-05 | End: 2022-03-05

## 2022-03-05 RX ADMIN — PANCRELIPASE 6000 UNITS: 30000; 6000; 19000 CAPSULE, DELAYED RELEASE PELLETS ORAL at 16:29

## 2022-03-05 RX ADMIN — AMITRIPTYLINE HYDROCHLORIDE 20 MG: 10 TABLET, FILM COATED ORAL at 21:03

## 2022-03-05 RX ADMIN — ONDANSETRON 4 MG: 2 INJECTION INTRAMUSCULAR; INTRAVENOUS at 07:45

## 2022-03-05 RX ADMIN — POTASSIUM CHLORIDE AND SODIUM CHLORIDE 100 ML/HR: 900; 300 INJECTION, SOLUTION INTRAVENOUS at 11:04

## 2022-03-05 RX ADMIN — ESCITALOPRAM OXALATE 20 MG: 10 TABLET ORAL at 21:03

## 2022-03-05 RX ADMIN — POTASSIUM CHLORIDE 20 MEQ: 200 INJECTION, SOLUTION INTRAVENOUS at 00:32

## 2022-03-05 RX ADMIN — PANCRELIPASE 6000 UNITS: 30000; 6000; 19000 CAPSULE, DELAYED RELEASE PELLETS ORAL at 12:01

## 2022-03-05 RX ADMIN — FERROUS SULFATE TAB 325 MG (65 MG ELEMENTAL FE) 325 MG: 325 (65 FE) TAB at 09:02

## 2022-03-05 RX ADMIN — AMILORIDE HYDROCHLORIDE 5 MG: 5 TABLET ORAL at 09:03

## 2022-03-05 RX ADMIN — METHOCARBAMOL TABLETS 500 MG: 500 TABLET, COATED ORAL at 09:02

## 2022-03-05 RX ADMIN — POTASSIUM CHLORIDE 40 MEQ: 1500 TABLET, EXTENDED RELEASE ORAL at 09:34

## 2022-03-05 RX ADMIN — POTASSIUM CHLORIDE 20 MEQ: 14.9 INJECTION, SOLUTION INTRAVENOUS at 16:29

## 2022-03-05 RX ADMIN — METHOCARBAMOL TABLETS 500 MG: 500 TABLET, COATED ORAL at 17:42

## 2022-03-05 RX ADMIN — PANCRELIPASE 6000 UNITS: 30000; 6000; 19000 CAPSULE, DELAYED RELEASE PELLETS ORAL at 09:02

## 2022-03-05 RX ADMIN — ONDANSETRON 4 MG: 2 INJECTION INTRAMUSCULAR; INTRAVENOUS at 16:29

## 2022-03-05 NOTE — PLAN OF CARE
Problem: INFECTION - ADULT  Goal: Absence or prevention of progression during hospitalization  Description: INTERVENTIONS:  - Assess and monitor for signs and symptoms of infection  - Monitor lab/diagnostic results  - Monitor all insertion sites, i e  indwelling lines, tubes, and drains  - Monitor endotracheal if appropriate and nasal secretions for changes in amount and color  - [place] appropriate cooling/warming therapies per order  - Administer medications as ordered  - Instruct and encourage patient and family to use good hand hygiene technique  - Identify and instruct in appropriate isolation precautions for identified infection/condition  Outcome: Progressing     Problem: DISCHARGE PLANNING  Goal: Discharge to home or other facility with appropriate resources  Description: INTERVENTIONS:  - Identify barriers to discharge w/patient and caregiver  - Arrange for needed discharge resources and transportation as appropriate  - Identify discharge learning needs (meds, wound care, etc )  - Arrange for interpretive services to assist at discharge as needed  - Refer to Case Management Department for coordinating discharge planning if the patient needs post-hospital services based on physician/advanced practitioner order or complex needs related to functional status, cognitive ability, or social support system  Outcome: Progressing     Problem: Knowledge Deficit  Goal: Patient/family/caregiver demonstrates understanding of disease process, treatment plan, medications, and discharge instructions  Description: Complete learning assessment and assess knowledge base    Interventions:  - Provide teaching at level of understanding  - Provide teaching via preferred learning methods  Outcome: Progressing     Problem: METABOLIC, FLUID AND ELECTROLYTES - ADULT  Goal: Electrolytes maintained within normal limits  Description: INTERVENTIONS:  - Monitor labs and assess patient for signs and symptoms of electrolyte imbalances  - Administer electrolyte replacement as ordered  - Monitor response to electrolyte replacements, including repeat lab results as appropriate  - Instruct patient on fluid and nutrition as appropriate  Outcome: Progressing     Problem: GASTROINTESTINAL - ADULT  Goal: Minimal or absence of nausea and/or vomiting  Description: INTERVENTIONS:  - Administer IV fluids if ordered to ensure adequate hydration  - Maintain NPO status until nausea and vomiting are resolved  - Nasogastric tube if ordered  - Administer ordered antiemetic medications as needed  - Provide nonpharmacologic comfort measures as appropriate  - Advance diet as tolerated, if ordered  - Consider nutrition services referral to assist patient with adequate nutrition and appropriate food choices  Outcome: Progressing     Problem: Nutrition/Hydration-ADULT  Goal: Nutrient/Hydration intake appropriate for improving, restoring or maintaining nutritional needs  Description: Monitor and assess patient's nutrition/hydration status for malnutrition  Collaborate with interdisciplinary team and initiate plan and interventions as ordered  Monitor patient's weight and dietary intake as ordered or per policy  Utilize nutrition screening tool and intervene as necessary  Determine patient's food preferences and provide high-protein, high-caloric foods as appropriate       INTERVENTIONS:  - Monitor oral intake, urinary output, labs, and treatment plans  - Assess nutrition and hydration status and recommend course of action  - Evaluate amount of meals eaten  - Assist patient with eating if necessary   - Allow adequate time for meals  - Recommend/ encourage appropriate diets, oral nutritional supplements, and vitamin/mineral supplements  - Order, calculate, and assess calorie counts as needed  - Recommend, monitor, and adjust tube feedings and TPN/PPN based on assessed needs  - Assess need for intravenous fluids  - Provide specific nutrition/hydration education as appropriate  - Include patient/family/caregiver in decisions related to nutrition  Outcome: Progressing

## 2022-03-05 NOTE — PROGRESS NOTES
Progress Note - Nephrology   La Jones 44 y o  female MRN: [id number]  Unit/Bed#: -01 Encounter: [id number]    A/P:  1  Hypokalemia   - Has a history of increased renal excretion   - Takes IV infusions daily at home   - had intractable nausea and vomiting and presented with hypokalemia   - Is stable today with a replaced potassium   - Will need to keep Zofran at home to avert volume losses with nausea and vomiting    2  Hypovolemic hyponatremia   - resolved    3  Intractable vomiting   - resolved     4  Chronic pancreatitis   - stable    5  Hypomagnesemia   - replaced    This patient has had multiple workups to see why she wastes magnesium  and potasium at tertiary centers  A diagnosis was not made and she is dependent on daily IV infusions  She has done well and has not had recurrent hospitalizations   For a while        Follow up reason for today's visit: Electrolyte abnormalities    Hypokalemia    Patient Active Problem List   Diagnosis    Hypokalemia    History of gastric bypass    Migraine without aura and without status migrainosus, not intractable    Left-sided weakness    Hypophosphatemia    Anemia    Vitamin D insufficiency    Elevated CPK    Vitamin B12 deficiency    Cervical lymphadenopathy    Fatigue    Paresthesia of both lower extremities    Paresthesias    B12 deficiency    Gastric bypass status for obesity    GERD (gastroesophageal reflux disease)    Migraine    WAGNER (obstructive sleep apnea)    Other intestinal malabsorption    Right ovarian cyst    Chest pain    Nausea & vomiting    Vertigo    Stress fracture of right foot with routine healing    Normal anion gap metabolic acidosis    Middle ear effusion    Left lower quadrant abdominal pain    Blood creatinine increased compared with prior measurement    Gas pain    Acute on chronic abdominal pain    Cellulitis of chest wall    Chronic pancreatitis (HCC)    Open wound / skin tear of right chest wall    Other hemorrhoids    Iron deficiency    Intractable vomiting    Acute hyponatremia         Subjective:   A ten point ROS is completely negative today  She vomited yesterday but not today  No diarrhea  Eating lightly and keeping it down      Objective:     Vitals: Blood pressure 95/62, pulse 68, temperature 98 6 °F (37 °C), resp  rate 17, height 5' 3" (1 6 m), weight 56 6 kg (124 lb 12 5 oz), SpO2 99 %  ,Body mass index is 22 1 kg/m²  Weight (last 2 days)     Date/Time Weight    03/03/22 21:19:21 56 6 (124 78)    03/03/22 1912 56 7 (125)            Intake/Output Summary (Last 24 hours) at 3/5/2022 1558  Last data filed at 3/5/2022 1104  Gross per 24 hour   Intake 1030 ml   Output --   Net 1030 ml     I/O last 3 completed shifts: In: 1593 3 [P O :630;  I V :963 3]  Out: -          Physical Exam: BP 95/62   Pulse 68   Temp 98 6 °F (37 °C)   Resp 17   Ht 5' 3" (1 6 m)   Wt 56 6 kg (124 lb 12 5 oz)   LMP  (LMP Unknown)   SpO2 99%   BMI 22 10 kg/m²     General Appearance:    Alert, cooperative, no distress, appears stated age   Head:    Normocephalic, without obvious abnormality, atraumatic   Eyes:    Conjunctiva/corneas clear   Ears:    Normal external ears   Nose:   Nares normal, septum midline, mucosa normal, no drainage    or sinus tenderness   Throat:   Lips, mucosa, and tongue normal; teeth and gums normal   Neck:   Supple, symmetrical, trachea midline, no adenopathy;        thyroid:  No enlargement/tenderness/nodules; no carotid    bruit or JVD   Back:     Symmetric, no curvature, ROM normal, no CVA tenderness   Lungs:     Clear to auscultation bilaterally, respirations unlabored   Chest wall:    No tenderness or deformity   Heart:    Regular rate and rhythm, S1 and S2 normal, no murmur, rub   or gallop   Abdomen:     Soft, non-tender, bowel sounds active   Extremities:   Extremities normal, atraumatic, no cyanosis or edema   Skin:   Skin color, texture, turgor normal, no rashes or lesions   Lymph nodes: Cervical normal   Neurologic:   CNII-XII intact            Lab, Imaging and other studies: I have personally reviewed pertinent labs  CBC:   Lab Results   Component Value Date    WBC 4 97 03/05/2022    HGB 11 2 (L) 03/05/2022    HCT 32 5 (L) 03/05/2022    MCV 87 03/05/2022     03/05/2022    MCH 29 9 03/05/2022    MCHC 34 5 03/05/2022    RDW 15 1 03/05/2022    MPV 9 9 03/05/2022     CMP:   Lab Results   Component Value Date    K 3 6 03/05/2022     03/05/2022    CO2 21 03/05/2022    BUN 12 03/05/2022    CREATININE 0 88 03/05/2022    CALCIUM 7 2 (L) 03/05/2022    EGFR 83 03/05/2022         Results from last 7 days   Lab Units 03/05/22  1205 03/05/22  0513 03/04/22  2153   POTASSIUM mmol/L 3 6 3 3* 2 7*   CHLORIDE mmol/L 107 110* 105   CO2 mmol/L 21 22 19*   BUN mg/dL 12 15 15   CREATININE mg/dL 0 88 0 95 1 19   CALCIUM mg/dL 7 2* 7 3* 7 4*         Phosphorus: No results found for: PHOS  Magnesium:   Lab Results   Component Value Date    MG 2 1 03/05/2022     Urinalysis: No results found for: COLORU, CLARITYU, SPECGRAV, PHUR, LEUKOCYTESUR, NITRITE, PROTEINUA, GLUCOSEU, KETONESU, BILIRUBINUR, BLOODU  Ionized Calcium: No results found for: CAION  Coagulation: No results found for: PT, INR, APTT  Troponin: No results found for: TROPONINI  ABG: No results found for: PHART, ISS4QSF, PO2ART, AHO6JDU, P7QCEYXT, BEART, SOURCE  Radiology review:     IMAGING  No results found      Current Facility-Administered Medications   Medication Dose Route Frequency    acetaminophen (TYLENOL) tablet 650 mg  650 mg Oral Q6H PRN    AMILoride tablet 5 mg  5 mg Oral Daily    amitriptyline (ELAVIL) tablet 20 mg  20 mg Oral HS    escitalopram (LEXAPRO) tablet 20 mg  20 mg Oral HS    ferrous sulfate tablet 325 mg  325 mg Oral Every Other Day    methocarbamol (ROBAXIN) tablet 500 mg  500 mg Oral BID    ondansetron (ZOFRAN) injection 4 mg  4 mg Intravenous Q8H PRN    pancrelipase (Lip-Prot-Amyl) (CREON) delayed release capsule 6,000 Units  6,000 Units Oral TID With Meals    potassium chloride 20 mEq IVPB (premix)  20 mEq Intravenous TID    promethazine (PHENERGAN) injection 25 mg  25 mg Intravenous Q6H PRN    sodium chloride 0 9 % with KCl 40 mEq/L infusion (premix)  100 mL/hr Intravenous Continuous     Medications Discontinued During This Encounter   Medication Reason    Vitamin B Complex TABS     ferrous sulfate tablet 325 mg        Diana Calero MD      This progress note was produced in part using a dictation device which may document imprecise wording from author's original intent

## 2022-03-05 NOTE — PROGRESS NOTES
[address]  Progress Note - Perfecto Snide 1985, 44 y o  female MRN: [id number]  Unit/Bed#: -Dot Encounter: [id number]  Primary Care Provider: Naun Castillo MD   Date and time admitted to hospital: 3/3/2022  7:10 PM    * Hypokalemia  Assessment & Plan  · History chronic hypokalemia managed by Nephrology as outpatient  · Infuses 80 mEq potassium daily through Port-A-Cath to maintain adequate potassium levels  · Has been admitted to hospital multiple times over the past few years  · Has been ill with vomiting since Monday-suspected gastroenteritis  · Potassium 1 9 in ER-admitted for telemetry and potassium repletion  · Magnesium normal  · Appreciate nephrology consultation  · Continue to replete potassium and monitor    Acute hyponatremia  Assessment & Plan  · Sodium 132 on admission - resolved  · Hypovolemia hyponatremia  · Trend with volume repletion    Intractable vomiting  Assessment & Plan  · Reports intractable vomiting since Monday-suspected gastroenteritis  · Zofran ODT at home without improvement  · Continue antiemetics and monitor  · IV hydration  · Advanced diet as tolerated    Iron deficiency  Assessment & Plan  Continue PTA ferrous sulfate when tolerating oral intake    Chronic pancreatitis (HCC)  Assessment & Plan  · Continue Pancrease with meals  · Low-fat diet        VTE Pharmacologic Prophylaxis: VTE Score: 0 Low Risk (Score 0-2) - Encourage Ambulation  Patient Centered Rounds: I performed bedside rounds with nursing staff today  Discussions with Specialists or Other Care Team Provider: nephrology    Education and Discussions with Family / Patient: Updated  () via phone  Time Spent for Care: 30 minutes  More than 50% of total time spent on counseling and coordination of care as described above      Current Length of Stay: 2 day(s)  Current Patient Status: Inpatient   Certification Statement: The patient will continue to require additional inpatient hospital stay due to K repletion  Discharge Plan: Anticipate discharge tomorrow to home  Code Status: Level 1 - Full Code    Subjective:   Patient seen and examined at bedside  Endorses feeling better however still with a lot of nausea  Tolerating regular diet at this time  Objective:     Vitals:   Temp (24hrs), Av °F (36 7 °C), Min:97 4 °F (36 3 °C), Max:98 7 °F (37 1 °C)    Temp:  [97 4 °F (36 3 °C)-98 7 °F (37 1 °C)] 97 4 °F (36 3 °C)  HR:  [70-82] 70  Resp:  [17-18] 18  BP: ()/(58-68) 99/66  SpO2:  [98 %-100 %] 100 %  Body mass index is 22 1 kg/m²  Input and Output Summary (last 24 hours): Intake/Output Summary (Last 24 hours) at 3/5/2022 1309  Last data filed at 3/5/2022 1104  Gross per 24 hour   Intake 1030 ml   Output --   Net 1030 ml       Physical Exam:   Physical Exam  Vitals and nursing note reviewed  Constitutional:       General: She is not in acute distress  Appearance: She is well-developed  HENT:      Head: Normocephalic and atraumatic  Eyes:      Conjunctiva/sclera: Conjunctivae normal    Cardiovascular:      Rate and Rhythm: Normal rate and regular rhythm  Heart sounds: No murmur heard  Pulmonary:      Effort: Pulmonary effort is normal  No respiratory distress  Breath sounds: Normal breath sounds  Abdominal:      Palpations: Abdomen is soft  Tenderness: There is no abdominal tenderness  Musculoskeletal:      Cervical back: Neck supple  Skin:     General: Skin is warm and dry  Neurological:      Mental Status: She is alert          Additional Data:     Labs:  Results from last 7 days   Lab Units 22  0512 22  194   WBC Thousand/uL 4 97   < > 6 65   HEMOGLOBIN g/dL 11 2*   < > 14 5   HEMATOCRIT % 32 5*   < > 40 8   PLATELETS Thousands/uL 216   < > 253   NEUTROS PCT %  --   --  75   LYMPHS PCT %  --   --  15   MONOS PCT %  --   --  8   EOS PCT %  --   --  1    < > = values in this interval not displayed  Results from last 7 days   Lab Units 03/05/22  1205   SODIUM mmol/L 138   POTASSIUM mmol/L 3 6   CHLORIDE mmol/L 107   CO2 mmol/L 21   BUN mg/dL 12   CREATININE mg/dL 0 88   ANION GAP mmol/L 10   CALCIUM mg/dL 7 2*   GLUCOSE RANDOM mg/dL 86                       Lines/Drains:  Invasive Devices  Report    Central Venous Catheter Line            Port A Cath 12/22/18 Right Chest 1169 days                Central Line:  Goal for removal: N/A - Chronic PICC               Recent Cultures (last 7 days):         Last 24 Hours Medication List:   Current Facility-Administered Medications   Medication Dose Route Frequency Provider Last Rate    acetaminophen  650 mg Oral Q6H PRN Zakiya S Kaety, CRNP      AMILoride  5 mg Oral Daily Zakiya S Katey, CRNP      amitriptyline  20 mg Oral HS Zakiya S Katey, CRNP      escitalopram  20 mg Oral HS Zakiya S Katey, CRNP      ferrous sulfate  325 mg Oral Every Other Day Zakiya S Katey, CRNP      methocarbamol  500 mg Oral BID Zakiya S Katey, CRNP      ondansetron  4 mg Intravenous Q8H PRN Zakiya S Katey, CRNP      pancrelipase (Lip-Prot-Amyl)  6,000 Units Oral TID With Meals Zakiya S Katey, CRNP      potassium chloride  20 mEq Intravenous TID Jacalyn Baumgarten Kirsten Hal, MD      promethazine  25 mg Intravenous Q6H PRN Zakiya S Katey, CRNP      sodium chloride 0 9 % with KCl 40 mEq/L  100 mL/hr Intravenous Continuous Zakiya S Katey, CRNP 100 mL/hr (03/05/22 1104)        Today, Patient Was Seen By: Christian Reynolds MD    **Please Note: This note may have been constructed using a voice recognition system  **

## 2022-03-05 NOTE — UTILIZATION REVIEW
Inpatient Admission Authorization Request   NOTIFICATION OF INPATIENT ADMISSION/INPATIENT AUTHORIZATION REQUEST   SERVICING FACILITY:   [address] [place] [address]  Jose Padilla [facility], [address]  Tax ID: [id number]  NPI: [id number]  Place of Service: Inpatient [address] [facility]  [address]  Place of Service Code: 24     ATTENDING PROVIDER:  Attending Name and NPI#: Joseline Lemus [[id number]]  Address: Jose Padilla [address] [place] [address], [address]  Phone: [phone]     UTILIZATION REVIEW CONTACT:  Mar December, Utilization   Network Utilization Review Department  Phone: [phone]  Fax [phone]  Email: [email or web address]     PHYSICIAN ADVISORY SERVICES:  FOR PVLS-ES-JZTD REVIEW - MEDICAL NECESSITY DENIAL  Phone: [phone]  Fax: [phone]  Email: [email or web address]     TYPE OF REQUEST:  Inpatient Status     ADMISSION INFORMATION:  ADMISSION DATE/TIME: 3/3/22  8:22 PM  PATIENT DIAGNOSIS CODE/DESCRIPTION:  Hypokalemia [E87 6]  Abnormal laboratory test result [R89 9]  DISCHARGE DATE/TIME: No discharge date for patient encounter  IMPORTANT INFORMATION:  Please contact the Mar December directly with any questions or concerns regarding this request  Department voicemails are confidential     Send requests for admission clinical reviews, concurrent reviews, approvals, and administrative denials due to lack of clinical to fax [phone]

## 2022-03-06 VITALS
OXYGEN SATURATION: 100 % | RESPIRATION RATE: 18 BRPM | SYSTOLIC BLOOD PRESSURE: 101 MMHG | WEIGHT: 124.78 LBS | TEMPERATURE: 97.5 F | HEART RATE: 79 BPM | BODY MASS INDEX: 22.11 KG/M2 | DIASTOLIC BLOOD PRESSURE: 56 MMHG | HEIGHT: 63 IN

## 2022-03-06 LAB
ANION GAP SERPL CALCULATED.3IONS-SCNC: 7 MMOL/L (ref 4–13)
BUN SERPL-MCNC: 10 MG/DL (ref 5–25)
CALCIUM SERPL-MCNC: 7.1 MG/DL (ref 8.3–10.1)
CHLORIDE SERPL-SCNC: 109 MMOL/L (ref 100–108)
CO2 SERPL-SCNC: 22 MMOL/L (ref 21–32)
CREAT SERPL-MCNC: 0.76 MG/DL (ref 0.6–1.3)
GFR SERPL CREATININE-BSD FRML MDRD: 99 ML/MIN/1.73SQ M
GLUCOSE SERPL-MCNC: 76 MG/DL (ref 65–140)
POTASSIUM SERPL-SCNC: 3.6 MMOL/L (ref 3.5–5.3)
SODIUM SERPL-SCNC: 138 MMOL/L (ref 136–145)

## 2022-03-06 PROCEDURE — 99239 HOSP IP/OBS DSCHRG MGMT >30: CPT | Performed by: STUDENT IN AN ORGANIZED HEALTH CARE EDUCATION/TRAINING PROGRAM

## 2022-03-06 PROCEDURE — 80048 BASIC METABOLIC PNL TOTAL CA: CPT | Performed by: STUDENT IN AN ORGANIZED HEALTH CARE EDUCATION/TRAINING PROGRAM

## 2022-03-06 RX ORDER — POTASSIUM CHLORIDE 14.9 MG/ML
20 INJECTION INTRAVENOUS
Status: COMPLETED | OUTPATIENT
Start: 2022-03-06 | End: 2022-03-06

## 2022-03-06 RX ORDER — POTASSIUM CHLORIDE 20MEQ/15ML
40 LIQUID (ML) ORAL ONCE
Status: COMPLETED | OUTPATIENT
Start: 2022-03-06 | End: 2022-03-06

## 2022-03-06 RX ORDER — ONDANSETRON 4 MG/1
4 TABLET, FILM COATED ORAL EVERY 8 HOURS PRN
Qty: 20 TABLET | Refills: 0 | Status: SHIPPED | OUTPATIENT
Start: 2022-03-06

## 2022-03-06 RX ADMIN — POTASSIUM CHLORIDE 20 MEQ: 200 INJECTION, SOLUTION INTRAVENOUS at 07:55

## 2022-03-06 RX ADMIN — PANCRELIPASE 6000 UNITS: 30000; 6000; 19000 CAPSULE, DELAYED RELEASE PELLETS ORAL at 07:58

## 2022-03-06 RX ADMIN — POTASSIUM CHLORIDE 20 MEQ: 200 INJECTION, SOLUTION INTRAVENOUS at 05:38

## 2022-03-06 RX ADMIN — ONDANSETRON 4 MG: 2 INJECTION INTRAMUSCULAR; INTRAVENOUS at 07:57

## 2022-03-06 RX ADMIN — METHOCARBAMOL TABLETS 500 MG: 500 TABLET, COATED ORAL at 08:00

## 2022-03-06 RX ADMIN — AMILORIDE HYDROCHLORIDE 5 MG: 5 TABLET ORAL at 08:00

## 2022-03-06 RX ADMIN — POTASSIUM CHLORIDE 40 MEQ: 20 SOLUTION ORAL at 10:10

## 2022-03-06 NOTE — ASSESSMENT & PLAN NOTE
· Reports intractable vomiting since Monday-suspected gastroenteritis  · Zofran ODT at home without improvement  · Continue antiemetics and monitor  · IV hydration  · Patient tolerating regular diet at this time

## 2022-03-06 NOTE — NURSING NOTE
Peripheral IV removed  AVS printed and reviewed with pt  Belongings reviewed  No medications to return from pharmacy  Script sent electronically to pharmacy  Pt walked out of hospital with RN

## 2022-03-06 NOTE — PLAN OF CARE
Problem: INFECTION - ADULT  Goal: Absence or prevention of progression during hospitalization  Description: INTERVENTIONS:  - Assess and monitor for signs and symptoms of infection  - Monitor lab/diagnostic results  - Monitor all insertion sites, i e  indwelling lines, tubes, and drains  - Monitor endotracheal if appropriate and nasal secretions for changes in amount and color  - [place] appropriate cooling/warming therapies per order  - Administer medications as ordered  - Instruct and encourage patient and family to use good hand hygiene technique  - Identify and instruct in appropriate isolation precautions for identified infection/condition  Outcome: Progressing     Problem: DISCHARGE PLANNING  Goal: Discharge to home or other facility with appropriate resources  Description: INTERVENTIONS:  - Identify barriers to discharge w/patient and caregiver  - Arrange for needed discharge resources and transportation as appropriate  - Identify discharge learning needs (meds, wound care, etc )  - Arrange for interpretive services to assist at discharge as needed  - Refer to Case Management Department for coordinating discharge planning if the patient needs post-hospital services based on physician/advanced practitioner order or complex needs related to functional status, cognitive ability, or social support system  Outcome: Progressing     Problem: Knowledge Deficit  Goal: Patient/family/caregiver demonstrates understanding of disease process, treatment plan, medications, and discharge instructions  Description: Complete learning assessment and assess knowledge base    Interventions:  - Provide teaching at level of understanding  - Provide teaching via preferred learning methods  Outcome: Progressing     Problem: METABOLIC, FLUID AND ELECTROLYTES - ADULT  Goal: Electrolytes maintained within normal limits  Description: INTERVENTIONS:  - Monitor labs and assess patient for signs and symptoms of electrolyte imbalances  - Administer electrolyte replacement as ordered  - Monitor response to electrolyte replacements, including repeat lab results as appropriate  - Instruct patient on fluid and nutrition as appropriate  Outcome: Progressing     Problem: Nutrition/Hydration-ADULT  Goal: Nutrient/Hydration intake appropriate for improving, restoring or maintaining nutritional needs  Description: Monitor and assess patient's nutrition/hydration status for malnutrition  Collaborate with interdisciplinary team and initiate plan and interventions as ordered  Monitor patient's weight and dietary intake as ordered or per policy  Utilize nutrition screening tool and intervene as necessary  Determine patient's food preferences and provide high-protein, high-caloric foods as appropriate       INTERVENTIONS:  - Monitor oral intake, urinary output, labs, and treatment plans  - Assess nutrition and hydration status and recommend course of action  - Evaluate amount of meals eaten  - Assist patient with eating if necessary   - Allow adequate time for meals  - Recommend/ encourage appropriate diets, oral nutritional supplements, and vitamin/mineral supplements  - Recommend, monitor, and adjust tube feedings and TPN/PPN based on assessed needs  - Assess need for intravenous fluids  - Provide specific nutrition/hydration education as appropriate  - Include patient/family/caregiver in decisions related to nutrition  Outcome: Progressing     Problem: GASTROINTESTINAL - ADULT  Goal: Minimal or absence of nausea and/or vomiting  Description: INTERVENTIONS:  - Administer IV fluids if ordered to ensure adequate hydration  - Administer ordered antiemetic medications as needed  - Provide nonpharmacologic comfort measures as appropriate  - Advance diet as tolerated, if ordered  - Consider nutrition services referral to assist patient with adequate nutrition and appropriate food choices  Outcome: Progressing

## 2022-03-06 NOTE — DISCHARGE INSTRUCTIONS
Acute Nausea and Vomiting   WHAT YOU NEED TO KNOW:   Acute nausea and vomiting start suddenly, worsen quickly, and last a short time  DISCHARGE INSTRUCTIONS:   Seek care immediately if:   · You see blood in your vomit or your bowel movements  · You have sudden, severe pain in your chest and upper abdomen after hard vomiting or retching  · You have swelling in your neck and chest      · You are dizzy, cold, and thirsty and your eyes and mouth are dry  · You are urinating very little or not at all  · You have muscle weakness, leg cramps, and trouble breathing  · Your heart is beating much faster than normal      · You continue to vomit for more than 48 hours  Contact your healthcare provider if:   · You have frequent dry heaves (vomiting but nothing comes out)  · Your nausea and vomiting does not get better or go away after you use medicine  · You have questions or concerns about your condition or treatment  Medicines: You may need any of the following:  · Medicines  may be given to calm your stomach and stop your vomiting  You may also need medicines to help you feel more relaxed or to stop nausea and vomiting caused by motion sickness  · Gastrointestinal stimulants  are used to help empty your stomach and bowels  This may help decrease nausea and vomiting  · Take your medicine as directed  Contact your healthcare provider if you think your medicine is not helping or if you have side effects  Tell him or her if you are allergic to any medicine  Keep a list of the medicines, vitamins, and herbs you take  Include the amounts, and when and why you take them  Bring the list or the pill bottles to follow-up visits  Carry your medicine list with you in case of an emergency  Prevent or manage acute nausea and vomiting:   · Do not drink alcohol  Alcohol may upset or irritate your stomach  Too much alcohol can also cause acute nausea and vomiting  · Control stress    Headaches due to stress may cause nausea and vomiting  Find ways to relax and manage your stress  Get more rest and sleep  · Drink more liquids as directed  Vomiting can lead to dehydration  It is important to drink more liquids to help replace lost body fluids  Ask your healthcare provider how much liquid to drink each day and which liquids are best for you  Your provider may recommend that you drink an oral rehydration solution (ORS)  ORS contains water, salts, and sugar that are needed to replace the lost body fluids  Ask what kind of ORS to use, how much to drink, and where to get it  · Eat smaller meals, more often  Eat small amounts of food every 2 to 3 hours, even if you are not hungry  Food in your stomach may decrease your nausea  · Talk to your healthcare provider before you take over-the-counter (OTC) medicines  These medicines can cause serious problems if you use certain other medicines, or you have a medical condition  You may have problems if you use too much or use them for longer than the label says  Follow directions on the label carefully  Follow up with your healthcare provider as directed:  Write down your questions so you remember to ask them during your visits  © Copyright [employer] 2022 Information is for End User's use only and may not be sold, redistributed or otherwise used for commercial purposes  All illustrations and images included in CareNotes® are the copyrighted property of [employer]  or Misael Aguilera  The above information is an  only  It is not intended as medical advice for individual conditions or treatments  Talk to your doctor, nurse or pharmacist before following any medical regimen to see if it is safe and effective for you  Hypokalemia   WHAT YOU NEED TO KNOW:   Hypokalemia is a low level of potassium in your blood  Potassium helps control how your muscles, heart, and digestive system work   Hypokalemia occurs when your body loses too much potassium or does not absorb enough from food  DISCHARGE INSTRUCTIONS:   Seek care immediately if:   · You cannot move your arm or leg  · You have a fast or irregular heartbeat  · You are too tired or weak to stand up  Contact your healthcare provider if:   · You are vomiting, or you have diarrhea  · You have numbness or tingling in your arms or legs  · Your symptoms do not go away or they get worse  · You have questions or concerns about your condition or care  Medicines:   · Potassium  will be given to bring your potassium levels back to normal     · Take your medicine as directed  Contact your healthcare provider if you think your medicine is not helping or if you have side effects  Tell him of her if you are allergic to any medicine  Keep a list of the medicines, vitamins, and herbs you take  Include the amounts, and when and why you take them  Bring the list or the pill bottles to follow-up visits  Carry your medicine list with you in case of an emergency  Eat foods that are high in potassium:  Foods that are high in potassium include bananas, oranges, tomatoes, potatoes, and avocado  Kang beans, turkey, salmon, lean beef, yogurt, and milk are also high in potassium  Ask your healthcare provider or dietitian for more information about foods that are high in potassium  Follow up with your healthcare provider as directed:  Write down your questions so you remember to ask them during your visits  © Copyright [employer] 2022 Information is for End User's use only and may not be sold, redistributed or otherwise used for commercial purposes  All illustrations and images included in CareNotes® are the copyrighted property of [employer]  or Misael Aguilera  The above information is an  only  It is not intended as medical advice for individual conditions or treatments   Talk to your doctor, nurse or pharmacist before following any medical regimen to see if it is safe and effective for you

## 2022-03-06 NOTE — ASSESSMENT & PLAN NOTE
· History chronic hypokalemia managed by Nephrology as outpatient  · Infuses 80 mEq potassium daily through Port-A-Cath to maintain adequate potassium levels  · Has been admitted to hospital multiple times over the past few years  · Has been ill with vomiting since Monday-suspected gastroenteritis  · Potassium 1 9 in ER-admitted for telemetry and potassium repletion  · Magnesium normal  · Appreciate nephrology consultation  · Potassium repleted    Patient is stable for discharge today

## 2022-03-06 NOTE — PLAN OF CARE
Problem: INFECTION - ADULT  Goal: Absence or prevention of progression during hospitalization  Description: INTERVENTIONS:  - Assess and monitor for signs and symptoms of infection  - Monitor lab/diagnostic results  - Monitor all insertion sites, i e  indwelling lines, tubes, and drains  - Monitor endotracheal if appropriate and nasal secretions for changes in amount and color  - [place] appropriate cooling/warming therapies per order  - Administer medications as ordered  - Instruct and encourage patient and family to use good hand hygiene technique  - Identify and instruct in appropriate isolation precautions for identified infection/condition  Outcome: Progressing     Problem: DISCHARGE PLANNING  Goal: Discharge to home or other facility with appropriate resources  Description: INTERVENTIONS:  - Identify barriers to discharge w/patient and caregiver  - Arrange for needed discharge resources and transportation as appropriate  - Identify discharge learning needs (meds, wound care, etc )  - Arrange for interpretive services to assist at discharge as needed  - Refer to Case Management Department for coordinating discharge planning if the patient needs post-hospital services based on physician/advanced practitioner order or complex needs related to functional status, cognitive ability, or social support system  Outcome: Progressing     Problem: Knowledge Deficit  Goal: Patient/family/caregiver demonstrates understanding of disease process, treatment plan, medications, and discharge instructions  Description: Complete learning assessment and assess knowledge base    Interventions:  - Provide teaching at level of understanding  - Provide teaching via preferred learning methods  Outcome: Progressing     Problem: METABOLIC, FLUID AND ELECTROLYTES - ADULT  Goal: Electrolytes maintained within normal limits  Description: INTERVENTIONS:  - Monitor labs and assess patient for signs and symptoms of electrolyte imbalances  - Administer electrolyte replacement as ordered  - Monitor response to electrolyte replacements, including repeat lab results as appropriate  - Instruct patient on fluid and nutrition as appropriate  Outcome: Progressing     Problem: Nutrition/Hydration-ADULT  Goal: Nutrient/Hydration intake appropriate for improving, restoring or maintaining nutritional needs  Description: Monitor and assess patient's nutrition/hydration status for malnutrition  Collaborate with interdisciplinary team and initiate plan and interventions as ordered  Monitor patient's weight and dietary intake as ordered or per policy  Utilize nutrition screening tool and intervene as necessary  Determine patient's food preferences and provide high-protein, high-caloric foods as appropriate       INTERVENTIONS:  - Monitor oral intake, urinary output, labs, and treatment plans  - Assess nutrition and hydration status and recommend course of action  - Evaluate amount of meals eaten  - Assist patient with eating if necessary   - Allow adequate time for meals  - Recommend/ encourage appropriate diets, oral nutritional supplements, and vitamin/mineral supplements  - Order, calculate, and assess calorie counts as needed  - Recommend, monitor, and adjust tube feedings and TPN/PPN based on assessed needs  - Assess need for intravenous fluids  - Provide specific nutrition/hydration education as appropriate  - Include patient/family/caregiver in decisions related to nutrition  Outcome: Progressing     Problem: GASTROINTESTINAL - ADULT  Goal: Minimal or absence of nausea and/or vomiting  Description: INTERVENTIONS:  - Administer IV fluids if ordered to ensure adequate hydration  - Maintain NPO status until nausea and vomiting are resolved  - Nasogastric tube if ordered  - Administer ordered antiemetic medications as needed  - Provide nonpharmacologic comfort measures as appropriate  - Advance diet as tolerated, if ordered  - Consider nutrition services referral to assist patient with adequate nutrition and appropriate food choices  Outcome: Progressing

## 2022-03-06 NOTE — DISCHARGE SUMMARY
[address]  Discharge- Ean Hernandez 1985, 44 y o  female MRN: [id number]  Unit/Bed#: -01 Encounter: [id number]  Primary Care Provider: Danish Iyer MD   Date and time admitted to hospital: 3/3/2022  7:10 PM    * Hypokalemia  Assessment & Plan  · History chronic hypokalemia managed by Nephrology as outpatient  · Infuses 80 mEq potassium daily through Port-A-Cath to maintain adequate potassium levels  · Has been admitted to hospital multiple times over the past few years  · Has been ill with vomiting since Monday-suspected gastroenteritis  · Potassium 1 9 in ER-admitted for telemetry and potassium repletion  · Magnesium normal  · Appreciate nephrology consultation  · Potassium repleted    Patient is stable for discharge today    Acute hyponatremia  Assessment & Plan  · Sodium 132 on admission - resolved  · Hypovolemia hyponatremia  · Trend with volume repletion    Intractable vomiting  Assessment & Plan  · Reports intractable vomiting since Monday-suspected gastroenteritis  · Zofran ODT at home without improvement  · Continue antiemetics and monitor  · IV hydration  · Patient tolerating regular diet at this time    Iron deficiency  Assessment & Plan  Continue PTA ferrous sulfate when tolerating oral intake    Chronic pancreatitis (HCC)  Assessment & Plan  · Continue Pancrease with meals  · Low-fat diet      Medical Problems             Resolved Problems  Date Reviewed: 1/4/2022    None              Discharging Physician / Practitioner: Patsy Castanon MD  PCP: Danish Iyer MD  Admission Date:   Admission Orders (From admission, onward)     Ordered        03/03/22 2022  Inpatient Admission  Once                      Discharge Date: 03/06/22    Consultations During Hospital Stay:  · Nephrology    Procedures Performed:   · none    Significant Findings / Test Results:   - hypokalemia - resolved  - hyponatremia - resolved    Incidental Findings:   · none    Test Results Pending at Discharge (will require follow up):   · none     Outpatient Tests Requested:  · none    Complications:  none    Reason for Admission: hypokalemia and hyponatremia    Hospital Course: Dannielle Anderson is a 44 y o  female patient who originally presented to the hospital on 3/3/2022 due to intractable vomiting causing hypokalemia and hyponatremia  Patient with of protracted history of the same  Patient was monitored in telemetry for severe hypokalemia and was started on IV fluids as well as potassium repletion  Patient's values normalized and patient is currently stable for discharge home at this time  Patient is currently tolerating a regular diet with no more episodes of nausea and vomiting  Please see above list of diagnoses and related plan for additional information  Condition at Discharge: stable    Discharge Day Visit / Exam:   Subjective:  Patient seen and examined at bedside  Denies any complaints at this time  Denies any lightheadedness  Endorses feeling back to baseline and denies any more nausea or vomiting  Vitals: Blood Pressure: (!) 82/37 (03/06/22 0738) -- repeat /56  Pulse: 68 (03/06/22 0738)  Temperature: 97 5 °F (36 4 °C) (03/06/22 0738)  Temp Source: Temporal (03/03/22 1915)  Respirations: 18 (03/06/22 0738)  Height: 5' 3" (160 cm) (03/03/22 2119)  Weight - Scale: 56 6 kg (124 lb 12 5 oz) (03/03/22 2119)  SpO2: 100 % (03/06/22 0738)    Exam:   Physical Exam  Vitals and nursing note reviewed  Constitutional:       General: She is not in acute distress  Appearance: She is well-developed  HENT:      Head: Normocephalic and atraumatic  Eyes:      Conjunctiva/sclera: Conjunctivae normal    Cardiovascular:      Rate and Rhythm: Normal rate and regular rhythm  Heart sounds: No murmur heard  Pulmonary:      Effort: Pulmonary effort is normal  No respiratory distress  Breath sounds: Normal breath sounds     Abdominal:      Palpations: Abdomen is soft  Tenderness: There is no abdominal tenderness  Musculoskeletal:      Cervical back: Neck supple  Skin:     General: Skin is warm and dry  Neurological:      Mental Status: She is alert  Discussion with Family: Patient declined call to   Discharge instructions/Information to patient and family:   See after visit summary for information provided to patient and family  Provisions for Follow-Up Care:  See after visit summary for information related to follow-up care and any pertinent home health orders  Disposition:   Home    Planned Readmission: none     Discharge Statement:  I spent 45 minutes discharging the patient  This time was spent on the day of discharge  I had direct contact with the patient on the day of discharge  Greater than 50% of the total time was spent examining patient, answering all patient questions, arranging and discussing plan of care with patient as well as directly providing post-discharge instructions  Additional time then spent on discharge activities  Discharge Medications:  See after visit summary for reconciled discharge medications provided to patient and/or family        **Please Note: This note may have been constructed using a voice recognition system**

## 2022-03-06 NOTE — NURSING NOTE
TT Dr Eileen Remy  Confirmed that 11am BMP does not need to be drawn for discharge  Pt discharge order written  No need to draw blood work  Pt also states to RN that she came in with her port accessed and she must leave with it accessed so she can continue her K infusions at home

## 2022-03-07 NOTE — UTILIZATION REVIEW
Notification of Discharge   This is a Notification of Discharge from our facility [address]  Please be advised that this patient has been discharge from our facility  Below you will find the admission and discharge date and time including the patients disposition  UTILIZATION REVIEW CONTACT:  Marta Shepard  Utilization   Network Utilization Review Department  Phone: [phone] x carefully listen to the prompts  All voicemails are confidential   Email: [email or web address]     PHYSICIAN ADVISORY SERVICES:  FOR [id number]-TT-[id number] REVIEW - MEDICAL NECESSITY DENIAL  Phone: [phone]  Fax: [phone]  Email: [email or web address]     PRESENTATION DATE: 3/3/2022  7:10 PM  OBERVATION ADMISSION DATE:  INPATIENT ADMISSION DATE: 3/3/22  8:22 PM   DISCHARGE DATE: 3/6/2022 11:44 AM  DISPOSITION: Home with [facility] with [address] INFORMATION:  Send all requests for admission clinical reviews, approved or denied determinations and any other requests to dedicated fax number below belonging to the campus where the patient is receiving treatment   List of dedicated fax numbers:  [address] [place] [address] DENIALS (Administrative/Medical Necessity) [phone]   [address] (Maternity/NICU/Pediatrics) [phone]   Francis Seen [phone]   [address] [phone]   [address] [place] [address] [phone]   [address] [facility] [address] [place] [address],[address] [phone]   [facility]  [phone]   [address] [place] [address], [address]  [address] [facility] [address] [facility] [phone]

## 2022-03-08 ENCOUNTER — TELEPHONE (OUTPATIENT)
Dept: NEPHROLOGY | Facility: CLINIC | Age: 37
End: 2022-03-08

## 2022-03-08 NOTE — TELEPHONE ENCOUNTER
Jian Graves from [facility] is calling to make sure it is ok that patient resumes her potassium infusions after her recent admission in the hospital ?

## 2022-03-14 ENCOUNTER — HOSPITAL ENCOUNTER (EMERGENCY)
Facility: HOSPITAL | Age: 37
Discharge: HOME | End: 2022-03-14
Attending: EMERGENCY MEDICINE | Admitting: EMERGENCY MEDICINE
Payer: COMMERCIAL

## 2022-03-14 ENCOUNTER — TELEPHONE (OUTPATIENT)
Dept: NEPHROLOGY | Facility: CLINIC | Age: 37
End: 2022-03-14

## 2022-03-14 ENCOUNTER — APPOINTMENT (OUTPATIENT)
Dept: LAB | Facility: HOSPITAL | Age: 37
End: 2022-03-14
Payer: COMMERCIAL

## 2022-03-14 VITALS
RESPIRATION RATE: 16 BRPM | HEART RATE: 98 BPM | TEMPERATURE: 98 F | WEIGHT: 131 LBS | BODY MASS INDEX: 23.21 KG/M2 | SYSTOLIC BLOOD PRESSURE: 111 MMHG | OXYGEN SATURATION: 100 % | DIASTOLIC BLOOD PRESSURE: 56 MMHG

## 2022-03-14 DIAGNOSIS — Z45.2 ENCOUNTER FOR CARE RELATED TO VASCULAR ACCESS PORT: Primary | ICD-10-CM

## 2022-03-14 PROCEDURE — 80048 BASIC METABOLIC PNL TOTAL CA: CPT

## 2022-03-14 PROCEDURE — 99284 EMERGENCY DEPT VISIT MOD MDM: CPT

## 2022-03-14 PROCEDURE — 99284 EMERGENCY DEPT VISIT MOD MDM: CPT | Performed by: EMERGENCY MEDICINE

## 2022-03-14 NOTE — TELEPHONE ENCOUNTER
Patient called stating she is having a lot of pain in her port  Patient stated she tried flushing it and it burned  Patient called her nurse they recommended her to go to ER, but they will not re access it since they didn't accesses it  Any suggestions

## 2022-03-14 NOTE — ED PROVIDER NOTES
History  Chief Complaint   Patient presents with    Chest Pain     Pt reports pain in R mediport beginning this morning  Patient complains of pain in her right chest wall at the site of her port  She states that this morning the port was not returning blood and it was painful to try to use  Patient referred to the emergency department by her home health nurse who was unwilling to place a new access  History provided by:  Patient   used: No    Medical Problem  Location:  Nonfunctioning venous access port in right chest wall  Quality:  Painful, not returning blood  Severity:  Mild  Onset quality:  Gradual  Timing:  Constant  Progression:  Unchanged  Chronicity:  New  Context:  Noted this morning that the port was not functioning properly, not returning blood and painful to try to flush  Relieved by:  Nothing  Worsened by:  Nothing  Ineffective treatments:  None tried  Associated symptoms: no abdominal pain, no chest pain, no cough, no diarrhea, no ear pain, no fever, no headaches, no loss of consciousness, no nausea, no rash, no shortness of breath, no sore throat, no vomiting and no wheezing        Prior to Admission Medications   Prescriptions Last Dose Informant Patient Reported? Taking? AMILoride 5 mg tablet  Self No No   Sig: Take 1 tablet (5 mg total) by mouth daily   B Complex Vitamins (VITAMIN B COMPLEX PO)   Yes No   Sig: Take 1 capsule by mouth daily     Catheters MISC   No No   Sig: by Does not apply route 2 (two) times a week Port care per [place] protocol   Patient not taking: Reported on 1/4/2022    Syringe/Needle, Disp, (SYRINGE 3CC/25GX5/8") 25G X 5/8" 3 ML MISC   No No   Sig: Use once monthly for B12 injection     amitriptyline (ELAVIL) 10 mg tablet   Yes No   Sig: Take 20 mg by mouth daily at bedtime     cyanocobalamin 1,000 mcg/mL   No No   Sig: Inject 1 mL (1,000 mcg total) into a muscle every 30 (thirty) days   ergocalciferol (ERGOCALCIFEROL) 1 25 MG (55253 UT) capsule   No No   Sig: Take 1 capsule (50,000 Units total) by mouth once a week  and    escitalopram (LEXAPRO) 10 mg tablet  Self Yes No   Sig: Take 20 mg by mouth daily at bedtime    ferrous sulfate 325 (65 Fe) mg tablet   Yes No   Sig: Take 325 mg by mouth every other day Last took 3/9/20    lidocaine (Lidoderm) 5 %   No No   Sig: Apply 1 patch topically daily for 7 days Remove & Discard patch within 12 hours or as directed by MD   methocarbamol (ROBAXIN) 500 mg tablet   No No   Sig: Take 1 tablet (500 mg total) by mouth 2 (two) times a day   ondansetron (ZOFRAN) 4 mg tablet   No No   Sig: Take 1 tablet (4 mg total) by mouth every 8 (eight) hours as needed for nausea or vomiting   pancrelipase, Lip-Prot-Amyl, (Creon) 6,000 units delayed release capsule   Yes No   Sig: Take 6,000 units of lipase by mouth 3 (three) times a day with meals     potassium chloride 0 4 mEq/mL   No No   Sig: Infuse 200 mL (80 mEq total) into a venous catheter daily 80 meq in 1 litre bag to be infused daily over 8 hours      Facility-Administered Medications: None       Past Medical History:   Diagnosis Date    H  pylori infection     Hypokalemia     Left lower quadrant abdominal pain 2020    Migraine     Rectal bleeding     Renal disorder     Rhabdomyolysis        Past Surgical History:   Procedure Laterality Date    ABDOMINAL SURGERY      APPENDECTOMY       SECTION      CHOLECYSTECTOMY      COSMETIC SURGERY      "tummy tuck"    [place] GUIDED CENTRAL VENOUS ACCESS DEVICE INSERTION  2018    GASTRIC BYPASS      HYSTERECTOMY      LAPAROSCOPY      [place] SURG DIAGNOSTIC EXAM, ANORECTAL N/A 2021    Procedure: ANAL EXAM UNDER ANESTHESIA; HEMORRHOIDECTOMY;  Surgeon: Graham Winkler DO;  Location:  MAIN OR;  Service: General    TUNNELED VENOUS PORT PLACEMENT N/A 2018    Procedure: INSERTION VENOUS PORT (PORT-A-CATH);   Surgeon: Brock Shaffer DO;  Location: [facility];  Service: General       Family History   Problem Relation Age of Onset    No Known Problems Mother     Hypertension Father     Diabetes Father     Diabetes Maternal Grandfather      I have reviewed and agree with the history as documented  E-Cigarette/Vaping    E-Cigarette Use Never User      E-Cigarette/Vaping Substances    Nicotine No     THC No     CBD No      Social History     Tobacco Use    Smoking status: Never Smoker    Smokeless tobacco: Never Used   Vaping Use    Vaping Use: Never used   Substance Use Topics    Alcohol use: Never     Alcohol/week: 0 0 standard drinks     Comment: 0    Drug use: Never       Review of Systems   Constitutional: Negative for chills and fever  HENT: Negative for ear pain, hearing loss, sore throat, trouble swallowing and voice change  Eyes: Negative for pain and discharge  Respiratory: Negative for cough, shortness of breath and wheezing  Cardiovascular: Negative for chest pain and palpitations  Gastrointestinal: Negative for abdominal pain, blood in stool, constipation, diarrhea, nausea and vomiting  Genitourinary: Negative for dysuria, flank pain, frequency and hematuria  Musculoskeletal: Negative for joint swelling, neck pain and neck stiffness  Skin: Negative for rash and wound  Neurological: Negative for dizziness, seizures, loss of consciousness, syncope, facial asymmetry and headaches  Psychiatric/Behavioral: Negative for hallucinations, self-injury and suicidal ideas  All other systems reviewed and are negative  Physical Exam  Physical Exam  Vitals and nursing note reviewed  Constitutional:       General: She is not in acute distress  Appearance: She is well-developed  She is not ill-appearing or diaphoretic  HENT:      Head: Normocephalic and atraumatic  Right Ear: External ear normal       Left Ear: External ear normal    Eyes:      General: No scleral icterus  Right eye: No discharge           Left eye: No discharge  Extraocular Movements: Extraocular movements intact  Conjunctiva/sclera: Conjunctivae normal    Pulmonary:      Effort: Pulmonary effort is normal  No tachypnea, accessory muscle usage or respiratory distress  Breath sounds: Normal breath sounds  No decreased breath sounds, wheezing, rhonchi or rales  Chest:      Comments: The access needle in the right-sided chest wall port is not properly placed, half way out already  There is no drainage from the port site  There is no bleeding from the port site  Musculoskeletal:         General: No deformity or signs of injury  Normal range of motion  Cervical back: Normal range of motion and neck supple  Skin:     General: Skin is warm and dry  Coloration: Skin is not jaundiced or pale  Neurological:      General: No focal deficit present  Mental Status: She is alert and oriented to person, place, and time  Cranial Nerves: No cranial nerve deficit  Coordination: Coordination normal       Gait: Gait normal    Psychiatric:         Mood and Affect: Mood normal          Behavior: Behavior normal          Thought Content: Thought content normal          Judgment: Judgment normal          Vital Signs  ED Triage Vitals [03/14/22 1804]   Temperature Pulse Respirations Blood Pressure SpO2   98 °F (36 7 °C) 98 16 111/56 100 %      Temp src Heart Rate Source Patient Position - Orthostatic VS BP Location FiO2 (%)   -- -- Lying Right arm --      Pain Score       8           Vitals:    03/14/22 1804   BP: 111/56   Pulse: 98   Patient Position - Orthostatic VS: Lying         Visual Acuity      ED Medications  Medications - No data to display    Diagnostic Studies  Results Reviewed     None                 No orders to display              Procedures  Procedures         ED Course  ED Course as of 03/14/22 1923   Mon Mar 14, 2022   1912 At time of presentation, the port access in the patient's right chest wall is dislodged    ED RN placed a new port access  The port then flushed without difficulty with good blood return and no pain  Patient therefore stable for discharge  MDM  Number of Diagnoses or Management Options  Encounter for care related to vascular access port  Diagnosis management comments: Patient's old port access removed and port access replaced with new access device by ED RN without complication and all symptoms resolved with port functioning properly  Amount and/or Complexity of Data Reviewed  Review and summarize past medical records: yes        Disposition  Final diagnoses:   Encounter for care related to vascular access port     Time reflects when diagnosis was documented in both MDM as applicable and the Disposition within this note     Time User Action Codes Description Comment    3/14/2022  7:12 PM Annmarie Hook [Z45 2] Encounter for care related to vascular access port       ED Disposition     ED Disposition Condition Date/Time Comment    Discharge Stable Mon Mar 14, 2022  7:12 PM Taylor Cornelius discharge to home/self care              Follow-up Information     Follow up With Specialties Details Why [address] Jake Babb IV, MD [facility]   [address]  [address] [place] [address]  [phone]            Discharge Medication List as of 3/14/2022  7:13 PM      CONTINUE these medications which have NOT CHANGED    Details   AMILoride 5 mg tablet Take 1 tablet (5 mg total) by mouth daily, Starting Wed 2/20/2019, Normal      amitriptyline (ELAVIL) 10 mg tablet Take 20 mg by mouth daily at bedtime  , Historical Med      B Complex Vitamins (VITAMIN B COMPLEX PO) Take 1 capsule by mouth daily  , Historical Med      Catheters MISC by Does not apply route 2 (two) times a week Port care per 3seventy, Starting u 5/7/2020, No Print      cyanocobalamin 1,000 mcg/mL Inject 1 mL (1,000 mcg total) into a muscle every 30 (thirty) days, Starting Mon 5/4/2020, Normal      ergocalciferol (ERGOCALCIFEROL) 1 25 MG (14139 UT) capsule Take 1 capsule (50,000 Units total) by mouth once a week Mondays and Thursdays, Starting Fri 9/17/2021, No Print      escitalopram (LEXAPRO) 10 mg tablet Take 20 mg by mouth daily at bedtime , Historical Med      ferrous sulfate 325 (65 Fe) mg tablet Take 325 mg by mouth every other day Last took 3/9/20 , Historical Med      lidocaine (Lidoderm) 5 % Apply 1 patch topically daily for 7 days Remove & Discard patch within 12 hours or as directed by MD, Starting Sat 11/20/2021, Until Sat 11/27/2021, Normal      methocarbamol (ROBAXIN) 500 mg tablet Take 1 tablet (500 mg total) by mouth 2 (two) times a day, Starting Sat 11/20/2021, Normal      ondansetron (ZOFRAN) 4 mg tablet Take 1 tablet (4 mg total) by mouth every 8 (eight) hours as needed for nausea or vomiting, Starting Sun 3/6/2022, Normal      pancrelipase, Lip-Prot-Amyl, (Creon) 6,000 units delayed release capsule Take 6,000 units of lipase by mouth 3 (three) times a day with meals  , Historical Med      potassium chloride 0 4 mEq/mL Infuse 200 mL (80 mEq total) into a venous catheter daily 80 meq in 1 litre bag to be infused daily over 8 hours, Starting Tue 5/11/2021, Print      Syringe/Needle, Disp, (SYRINGE 3CC/25GX5/8") 25G X 5/8" 3 ML MISC Use once monthly for B12 injection  , Normal             No discharge procedures on file      PDMP Review       Value Time User    PDMP Reviewed  Yes 3/6/2022 10:38 AM Lisa Fry MD          ED Provider  Electronically Signed by           Domenic George MD  03/14/22 9736

## 2022-03-15 NOTE — TELEPHONE ENCOUNTER
Where did it burn, at the access site or elsewhere? Is there redness that she has noticed?   I will probably recommend for her to go to the ED for a check

## 2022-03-21 ENCOUNTER — APPOINTMENT (OUTPATIENT)
Dept: LAB | Facility: HOSPITAL | Age: 37
End: 2022-03-21
Payer: COMMERCIAL

## 2022-03-28 ENCOUNTER — APPOINTMENT (OUTPATIENT)
Dept: LAB | Facility: HOSPITAL | Age: 37
End: 2022-03-28
Attending: INTERNAL MEDICINE
Payer: COMMERCIAL

## 2022-04-06 ENCOUNTER — APPOINTMENT (OUTPATIENT)
Dept: LAB | Facility: HOSPITAL | Age: 37
End: 2022-04-06
Payer: COMMERCIAL

## 2022-04-12 ENCOUNTER — APPOINTMENT (OUTPATIENT)
Dept: LAB | Facility: HOSPITAL | Age: 37
End: 2022-04-12
Payer: COMMERCIAL

## 2022-04-18 ENCOUNTER — APPOINTMENT (OUTPATIENT)
Dept: LAB | Facility: HOSPITAL | Age: 37
End: 2022-04-18
Payer: COMMERCIAL

## 2022-04-18 ENCOUNTER — TELEPHONE (OUTPATIENT)
Dept: NEPHROLOGY | Facility: CLINIC | Age: 37
End: 2022-04-18

## 2022-04-18 NOTE — TELEPHONE ENCOUNTER
Cindy @ [facility] called with critical lab:    Potassium  2 7 from today

## 2022-04-18 NOTE — TELEPHONE ENCOUNTER
Yes, actually 2 extra bags of she can fit it in, if not then an extra bag today and tomorrow       Called and spoke to patient she will fit in 2 extra bags  She will contact the office to let us know how she is feeling afterwards

## 2022-04-18 NOTE — TELEPHONE ENCOUNTER
Ok Pls call Elen Parada and ask how she is feeling Has she been taking IV potassium as prescribed, any missed due to Holiday? We may need to have her go to ED and get IV infusions         Called and spoke with patient  She hasn't missed any of the IV potassium  She states she feels ok, however,  she was a little sick the last few days with her allergies  She vomited mucus a few times  She has Iv potassium at home and wanted to know if you want her to due an extra bag or so?

## 2022-04-23 ENCOUNTER — HOSPITAL ENCOUNTER (INPATIENT)
Facility: HOSPITAL | Age: 37
LOS: 2 days | Discharge: HOME | DRG: 425 | End: 2022-04-25
Attending: EMERGENCY MEDICINE | Admitting: FAMILY MEDICINE
Payer: COMMERCIAL

## 2022-04-23 ENCOUNTER — APPOINTMENT (EMERGENCY)
Dept: CT IMAGING | Facility: HOSPITAL | Age: 37
DRG: 425 | End: 2022-04-23
Payer: COMMERCIAL

## 2022-04-23 DIAGNOSIS — E87.6 HYPOKALEMIA: Primary | ICD-10-CM

## 2022-04-23 DIAGNOSIS — E83.51 HYPOCALCEMIA: ICD-10-CM

## 2022-04-23 DIAGNOSIS — R53.1 GENERALIZED WEAKNESS: ICD-10-CM

## 2022-04-23 DIAGNOSIS — F41.9 ANXIETY HYPERVENTILATION: ICD-10-CM

## 2022-04-23 DIAGNOSIS — F45.8 ANXIETY HYPERVENTILATION: ICD-10-CM

## 2022-04-23 LAB
ALBUMIN SERPL BCP-MCNC: 3.9 G/DL (ref 3.5–5)
ALP SERPL-CCNC: 56 U/L (ref 46–116)
ALT SERPL W P-5'-P-CCNC: 23 U/L (ref 12–78)
ANION GAP SERPL CALCULATED.3IONS-SCNC: 14 MMOL/L (ref 4–13)
APTT PPP: 27 SECONDS (ref 23–37)
AST SERPL W P-5'-P-CCNC: 27 U/L (ref 5–45)
BASOPHILS # BLD AUTO: 0.03 THOUSANDS/ΜL (ref 0–0.1)
BASOPHILS NFR BLD AUTO: 0 % (ref 0–1)
BILIRUB SERPL-MCNC: 0.46 MG/DL (ref 0.2–1)
BUN SERPL-MCNC: 13 MG/DL (ref 5–25)
CALCIUM SERPL-MCNC: 8.5 MG/DL (ref 8.3–10.1)
CHLORIDE SERPL-SCNC: 96 MMOL/L (ref 100–108)
CK SERPL-CCNC: 106 U/L (ref 26–192)
CO2 SERPL-SCNC: 26 MMOL/L (ref 21–32)
CREAT SERPL-MCNC: 1.12 MG/DL (ref 0.6–1.3)
EOSINOPHIL # BLD AUTO: 0.04 THOUSAND/ΜL (ref 0–0.61)
EOSINOPHIL NFR BLD AUTO: 1 % (ref 0–6)
ERYTHROCYTE [DISTWIDTH] IN BLOOD BY AUTOMATED COUNT: 14.4 % (ref 11.6–15.1)
GFR SERPL CREATININE-BSD FRML MDRD: 62 ML/MIN/1.73SQ M
GLUCOSE SERPL-MCNC: 100 MG/DL (ref 65–140)
GLUCOSE SERPL-MCNC: 88 MG/DL (ref 65–140)
HCT VFR BLD AUTO: 43.7 % (ref 34.8–46.1)
HGB BLD-MCNC: 14.6 G/DL (ref 11.5–15.4)
IMM GRANULOCYTES # BLD AUTO: 0.02 THOUSAND/UL (ref 0–0.2)
IMM GRANULOCYTES NFR BLD AUTO: 0 % (ref 0–2)
INR PPP: 0.85 (ref 0.84–1.19)
LYMPHOCYTES # BLD AUTO: 1.12 THOUSANDS/ΜL (ref 0.6–4.47)
LYMPHOCYTES NFR BLD AUTO: 16 % (ref 14–44)
MAGNESIUM SERPL-MCNC: 2.2 MG/DL (ref 1.6–2.6)
MCH RBC QN AUTO: 29.9 PG (ref 26.8–34.3)
MCHC RBC AUTO-ENTMCNC: 33.4 G/DL (ref 31.4–37.4)
MCV RBC AUTO: 90 FL (ref 82–98)
MONOCYTES # BLD AUTO: 0.46 THOUSAND/ΜL (ref 0.17–1.22)
MONOCYTES NFR BLD AUTO: 7 % (ref 4–12)
NEUTROPHILS # BLD AUTO: 5.43 THOUSANDS/ΜL (ref 1.85–7.62)
NEUTS SEG NFR BLD AUTO: 76 % (ref 43–75)
NRBC BLD AUTO-RTO: 0 /100 WBCS
NT-PROBNP SERPL-MCNC: 96 PG/ML
PLATELET # BLD AUTO: 315 THOUSANDS/UL (ref 149–390)
PMV BLD AUTO: 10.7 FL (ref 8.9–12.7)
POTASSIUM SERPL-SCNC: 2.2 MMOL/L (ref 3.5–5.3)
PROT SERPL-MCNC: 7.9 G/DL (ref 6.4–8.2)
PROTHROMBIN TIME: 11.5 SECONDS (ref 11.6–14.5)
RBC # BLD AUTO: 4.88 MILLION/UL (ref 3.81–5.12)
SODIUM SERPL-SCNC: 136 MMOL/L (ref 136–145)
WBC # BLD AUTO: 7.1 THOUSAND/UL (ref 4.31–10.16)

## 2022-04-23 PROCEDURE — 80053 COMPREHEN METABOLIC PANEL: CPT | Performed by: EMERGENCY MEDICINE

## 2022-04-23 PROCEDURE — 82550 ASSAY OF CK (CPK): CPT | Performed by: EMERGENCY MEDICINE

## 2022-04-23 PROCEDURE — 85025 COMPLETE CBC W/AUTO DIFF WBC: CPT | Performed by: EMERGENCY MEDICINE

## 2022-04-23 PROCEDURE — 85610 PROTHROMBIN TIME: CPT | Performed by: EMERGENCY MEDICINE

## 2022-04-23 PROCEDURE — 99285 EMERGENCY DEPT VISIT HI MDM: CPT

## 2022-04-23 PROCEDURE — 84484 ASSAY OF TROPONIN QUANT: CPT | Performed by: EMERGENCY MEDICINE

## 2022-04-23 PROCEDURE — 83735 ASSAY OF MAGNESIUM: CPT | Performed by: EMERGENCY MEDICINE

## 2022-04-23 PROCEDURE — 85730 THROMBOPLASTIN TIME PARTIAL: CPT | Performed by: EMERGENCY MEDICINE

## 2022-04-23 PROCEDURE — 83880 ASSAY OF NATRIURETIC PEPTIDE: CPT | Performed by: EMERGENCY MEDICINE

## 2022-04-23 PROCEDURE — 70450 CT HEAD/BRAIN W/O DYE: CPT

## 2022-04-23 PROCEDURE — 99285 EMERGENCY DEPT VISIT HI MDM: CPT | Performed by: EMERGENCY MEDICINE

## 2022-04-23 PROCEDURE — 36415 COLL VENOUS BLD VENIPUNCTURE: CPT | Performed by: EMERGENCY MEDICINE

## 2022-04-23 PROCEDURE — 82948 REAGENT STRIP/BLOOD GLUCOSE: CPT

## 2022-04-23 RX ORDER — LORAZEPAM 2 MG/ML
1 INJECTION INTRAMUSCULAR ONCE
Status: COMPLETED | OUTPATIENT
Start: 2022-04-23 | End: 2022-04-23

## 2022-04-23 RX ORDER — POTASSIUM CHLORIDE 14.9 MG/ML
20 INJECTION INTRAVENOUS
Status: COMPLETED | OUTPATIENT
Start: 2022-04-23 | End: 2022-04-24

## 2022-04-23 RX ORDER — SODIUM CHLORIDE, SODIUM LACTATE, POTASSIUM CHLORIDE, CALCIUM CHLORIDE 600; 310; 30; 20 MG/100ML; MG/100ML; MG/100ML; MG/100ML
100 INJECTION, SOLUTION INTRAVENOUS CONTINUOUS
Status: DISCONTINUED | OUTPATIENT
Start: 2022-04-23 | End: 2022-04-24

## 2022-04-23 RX ORDER — POTASSIUM CHLORIDE 20 MEQ/1
40 TABLET, EXTENDED RELEASE ORAL ONCE
Status: COMPLETED | OUTPATIENT
Start: 2022-04-23 | End: 2022-04-23

## 2022-04-23 RX ADMIN — POTASSIUM CHLORIDE 20 MEQ: 14.9 INJECTION, SOLUTION INTRAVENOUS at 23:52

## 2022-04-23 RX ADMIN — SODIUM CHLORIDE, SODIUM LACTATE, POTASSIUM CHLORIDE, AND CALCIUM CHLORIDE 1000 ML: .6; .31; .03; .02 INJECTION, SOLUTION INTRAVENOUS at 22:40

## 2022-04-23 RX ADMIN — POTASSIUM CHLORIDE 40 MEQ: 20 TABLET, EXTENDED RELEASE ORAL at 22:40

## 2022-04-23 RX ADMIN — LORAZEPAM 1 MG: 2 INJECTION INTRAMUSCULAR; INTRAVENOUS at 22:40

## 2022-04-24 PROBLEM — Z86.59 HISTORY OF ANXIETY: Status: ACTIVE | Noted: 2022-04-24

## 2022-04-24 LAB
2HR DELTA HS TROPONIN: >0 NG/L
ANION GAP SERPL CALCULATED.3IONS-SCNC: 9 MMOL/L (ref 4–13)
ANION GAP SERPL CALCULATED.3IONS-SCNC: 9 MMOL/L (ref 4–13)
BUN SERPL-MCNC: 10 MG/DL (ref 5–25)
BUN SERPL-MCNC: 11 MG/DL (ref 5–25)
CALCIUM SERPL-MCNC: 7.3 MG/DL (ref 8.3–10.1)
CALCIUM SERPL-MCNC: 7.7 MG/DL (ref 8.3–10.1)
CARDIAC TROPONIN I PNL SERPL HS: 2 NG/L
CARDIAC TROPONIN I PNL SERPL HS: <2 NG/L
CHLORIDE SERPL-SCNC: 105 MMOL/L (ref 100–108)
CHLORIDE SERPL-SCNC: 105 MMOL/L (ref 100–108)
CO2 SERPL-SCNC: 22 MMOL/L (ref 21–32)
CO2 SERPL-SCNC: 25 MMOL/L (ref 21–32)
CREAT SERPL-MCNC: 0.85 MG/DL (ref 0.6–1.3)
CREAT SERPL-MCNC: 1 MG/DL (ref 0.6–1.3)
GFR SERPL CREATININE-BSD FRML MDRD: 71 ML/MIN/1.73SQ M
GFR SERPL CREATININE-BSD FRML MDRD: 87 ML/MIN/1.73SQ M
GLUCOSE SERPL-MCNC: 76 MG/DL (ref 65–140)
GLUCOSE SERPL-MCNC: 82 MG/DL (ref 65–140)
PHOSPHATE SERPL-MCNC: 3.3 MG/DL (ref 2.7–4.5)
POTASSIUM SERPL-SCNC: 3.2 MMOL/L (ref 3.5–5.3)
POTASSIUM SERPL-SCNC: 3.6 MMOL/L (ref 3.5–5.3)
SODIUM SERPL-SCNC: 136 MMOL/L (ref 136–145)
SODIUM SERPL-SCNC: 139 MMOL/L (ref 136–145)

## 2022-04-24 PROCEDURE — 84100 ASSAY OF PHOSPHORUS: CPT | Performed by: NURSE PRACTITIONER

## 2022-04-24 PROCEDURE — 84484 ASSAY OF TROPONIN QUANT: CPT | Performed by: EMERGENCY MEDICINE

## 2022-04-24 PROCEDURE — 99222 1ST HOSP IP/OBS MODERATE 55: CPT | Performed by: NURSE PRACTITIONER

## 2022-04-24 PROCEDURE — 80048 BASIC METABOLIC PNL TOTAL CA: CPT | Performed by: FAMILY MEDICINE

## 2022-04-24 PROCEDURE — 80048 BASIC METABOLIC PNL TOTAL CA: CPT | Performed by: NURSE PRACTITIONER

## 2022-04-24 RX ORDER — POTASSIUM CHLORIDE AND SODIUM CHLORIDE 900; 300 MG/100ML; MG/100ML
100 INJECTION, SOLUTION INTRAVENOUS CONTINUOUS
Status: DISCONTINUED | OUTPATIENT
Start: 2022-04-24 | End: 2022-04-25

## 2022-04-24 RX ORDER — FERROUS SULFATE 325(65) MG
325 TABLET ORAL EVERY OTHER DAY
Status: DISCONTINUED | OUTPATIENT
Start: 2022-04-24 | End: 2022-04-25 | Stop reason: HOSPADM

## 2022-04-24 RX ORDER — POTASSIUM CHLORIDE 14.9 MG/ML
20 INJECTION INTRAVENOUS
Status: COMPLETED | OUTPATIENT
Start: 2022-04-24 | End: 2022-04-25

## 2022-04-24 RX ORDER — ESCITALOPRAM OXALATE 10 MG/1
20 TABLET ORAL
Status: DISCONTINUED | OUTPATIENT
Start: 2022-04-24 | End: 2022-04-25 | Stop reason: HOSPADM

## 2022-04-24 RX ORDER — AMILORIDE HYDROCHLORIDE 5 MG/1
5 TABLET ORAL DAILY
Status: DISCONTINUED | OUTPATIENT
Start: 2022-04-24 | End: 2022-04-25 | Stop reason: HOSPADM

## 2022-04-24 RX ORDER — METHOCARBAMOL 500 MG/1
500 TABLET, FILM COATED ORAL EVERY 6 HOURS PRN
Status: DISCONTINUED | OUTPATIENT
Start: 2022-04-24 | End: 2022-04-25 | Stop reason: HOSPADM

## 2022-04-24 RX ORDER — AMITRIPTYLINE HYDROCHLORIDE 10 MG/1
20 TABLET, FILM COATED ORAL
Status: DISCONTINUED | OUTPATIENT
Start: 2022-04-24 | End: 2022-04-24

## 2022-04-24 RX ORDER — DIAZEPAM 2 MG/1
2 TABLET ORAL ONCE
Status: COMPLETED | OUTPATIENT
Start: 2022-04-24 | End: 2022-04-24

## 2022-04-24 RX ORDER — POTASSIUM CHLORIDE 14.9 MG/ML
20 INJECTION INTRAVENOUS
Status: COMPLETED | OUTPATIENT
Start: 2022-04-24 | End: 2022-04-24

## 2022-04-24 RX ORDER — AMITRIPTYLINE HYDROCHLORIDE 25 MG/1
25 TABLET, FILM COATED ORAL
Status: DISCONTINUED | OUTPATIENT
Start: 2022-04-24 | End: 2022-04-25 | Stop reason: HOSPADM

## 2022-04-24 RX ORDER — CALCIUM CARBONATE 500(1250)
1 TABLET ORAL 2 TIMES DAILY WITH MEALS
Status: DISCONTINUED | OUTPATIENT
Start: 2022-04-24 | End: 2022-04-25 | Stop reason: HOSPADM

## 2022-04-24 RX ORDER — ONDANSETRON 2 MG/ML
4 INJECTION INTRAMUSCULAR; INTRAVENOUS EVERY 8 HOURS PRN
Status: DISCONTINUED | OUTPATIENT
Start: 2022-04-24 | End: 2022-04-25 | Stop reason: HOSPADM

## 2022-04-24 RX ORDER — ACETAMINOPHEN 325 MG/1
650 TABLET ORAL EVERY 6 HOURS PRN
Status: DISCONTINUED | OUTPATIENT
Start: 2022-04-24 | End: 2022-04-25 | Stop reason: HOSPADM

## 2022-04-24 RX ORDER — POTASSIUM CHLORIDE 14.9 MG/ML
20 INJECTION INTRAVENOUS ONCE
Status: COMPLETED | OUTPATIENT
Start: 2022-04-24 | End: 2022-04-24

## 2022-04-24 RX ORDER — LANOLIN ALCOHOL/MO/W.PET/CERES
6 CREAM (GRAM) TOPICAL
Status: DISCONTINUED | OUTPATIENT
Start: 2022-04-24 | End: 2022-04-25 | Stop reason: HOSPADM

## 2022-04-24 RX ADMIN — POTASSIUM CHLORIDE 20 MEQ: 14.9 INJECTION, SOLUTION INTRAVENOUS at 01:53

## 2022-04-24 RX ADMIN — DIAZEPAM 2 MG: 2 TABLET ORAL at 21:02

## 2022-04-24 RX ADMIN — PANCRELIPASE 72000 UNITS: 24000; 76000; 120000 CAPSULE, DELAYED RELEASE PELLETS ORAL at 08:19

## 2022-04-24 RX ADMIN — CALCIUM 1 TABLET: 500 TABLET ORAL at 15:02

## 2022-04-24 RX ADMIN — POTASSIUM CHLORIDE 20 MEQ: 14.9 INJECTION, SOLUTION INTRAVENOUS at 14:40

## 2022-04-24 RX ADMIN — ESCITALOPRAM OXALATE 20 MG: 10 TABLET ORAL at 20:44

## 2022-04-24 RX ADMIN — POTASSIUM CHLORIDE AND SODIUM CHLORIDE 100 ML/HR: 900; 300 INJECTION, SOLUTION INTRAVENOUS at 01:46

## 2022-04-24 RX ADMIN — METHOCARBAMOL TABLETS 500 MG: 500 TABLET, COATED ORAL at 15:02

## 2022-04-24 RX ADMIN — POTASSIUM CHLORIDE 20 MEQ: 14.9 INJECTION, SOLUTION INTRAVENOUS at 03:45

## 2022-04-24 RX ADMIN — AMILORIDE HYDROCHLORIDE 5 MG: 5 TABLET ORAL at 08:19

## 2022-04-24 RX ADMIN — Medication 6 MG: at 22:26

## 2022-04-24 RX ADMIN — AMITRIPTYLINE HYDROCHLORIDE 25 MG: 25 TABLET, FILM COATED ORAL at 20:44

## 2022-04-24 RX ADMIN — PANCRELIPASE 72000 UNITS: 24000; 76000; 120000 CAPSULE, DELAYED RELEASE PELLETS ORAL at 11:18

## 2022-04-24 RX ADMIN — POTASSIUM CHLORIDE 20 MEQ: 14.9 INJECTION, SOLUTION INTRAVENOUS at 21:58

## 2022-04-24 RX ADMIN — POTASSIUM CHLORIDE AND SODIUM CHLORIDE 100 ML/HR: 900; 300 INJECTION, SOLUTION INTRAVENOUS at 11:57

## 2022-04-24 RX ADMIN — FERROUS SULFATE TAB 325 MG (65 MG ELEMENTAL FE) 325 MG: 325 (65 FE) TAB at 08:19

## 2022-04-24 RX ADMIN — PANCRELIPASE 24000 UNITS: 24000; 76000; 120000 CAPSULE, DELAYED RELEASE PELLETS ORAL at 15:02

## 2022-04-24 RX ADMIN — POTASSIUM CHLORIDE 20 MEQ: 14.9 INJECTION, SOLUTION INTRAVENOUS at 05:43

## 2022-04-24 NOTE — PLAN OF CARE
Problem: MOBILITY - ADULT  Goal: Maintain or return to baseline ADL function  Description: INTERVENTIONS:  -  Assess patient's ability to carry out ADLs; assess patient's baseline for ADL function and identify physical deficits which impact ability to perform ADLs (bathing, care of mouth/teeth, toileting, grooming, dressing, etc )  - Assess/evaluate cause of self-care deficits   - Assess range of motion  - Assess patient's mobility; develop plan if impaired  - Assess patient's need for assistive devices and provide as appropriate  - Encourage maximum independence but intervene and supervise when necessary  - Involve family in performance of ADLs  - Assess for home care needs following discharge   - Consider OT consult to assist with ADL evaluation and planning for discharge  - Provide patient education as appropriate  Outcome: Progressing  Goal: Maintains/Returns to pre admission functional level  Description: INTERVENTIONS:  - Perform BMAT or MOVE assessment daily    - Set and communicate daily mobility goal to care team and patient/family/caregiver  - Collaborate with rehabilitation services on mobility goals if consulted  - Perform Range of Motion  times a day  - Reposition patient every  hours    - Dangle patient  times a day  - Stand patient  times a day  - Ambulate patient  times a day  - Out of bed to chair  times a day   - Out of bed for meals  times a day  - Out of bed for toileting  - Record patient progress and toleration of activity level   Outcome: Progressing     Problem: Potential for Falls  Goal: Patient will remain free of falls  Description: INTERVENTIONS:  - Educate patient/family on patient safety including physical limitations  - Instruct patient to call for assistance with activity   - Consult OT/PT to assist with strengthening/mobility   - Keep Call bell within reach  - Keep bed low and locked with side rails adjusted as appropriate  - Keep care items and personal belongings within reach  - Initiate and maintain comfort rounds  - Make Fall Risk Sign visible to staff  - Offer Toileting every  Hours, in advance of need  - Initiate/Maintain alarm  - Obtain necessary fall risk management equipment  - Apply yellow socks and bracelet for high fall risk patients  - Consider moving patient to room near nurses station  Outcome: Progressing     Problem: PAIN - ADULT  Goal: Verbalizes/displays adequate comfort level or baseline comfort level  Description: Interventions:  - Encourage patient to monitor pain and request assistance  - Assess pain using appropriate pain scale  - Administer analgesics based on type and severity of pain and evaluate response  - Implement non-pharmacological measures as appropriate and evaluate response  - Consider cultural and social influences on pain and pain management  - Notify physician/advanced practitioner if interventions unsuccessful or patient reports new pain  Outcome: Progressing     Problem: INFECTION - ADULT  Goal: Absence or prevention of progression during hospitalization  Description: INTERVENTIONS:  - Assess and monitor for signs and symptoms of infection  - Monitor lab/diagnostic results  - Monitor all insertion sites, i e  indwelling lines, tubes, and drains  - Monitor endotracheal if appropriate and nasal secretions for changes in amount and color  - [place] appropriate cooling/warming therapies per order  - Administer medications as ordered  - Instruct and encourage patient and family to use good hand hygiene technique  - Identify and instruct in appropriate isolation precautions for identified infection/condition  Outcome: Progressing  Goal: Absence of fever/infection during neutropenic period  Description: INTERVENTIONS:  - Monitor WBC    Outcome: Progressing     Problem: SAFETY ADULT  Goal: Maintain or return to baseline ADL function  Description: INTERVENTIONS:  -  Assess patient's ability to carry out ADLs; assess patient's baseline for ADL function and identify physical deficits which impact ability to perform ADLs (bathing, care of mouth/teeth, toileting, grooming, dressing, etc )  - Assess/evaluate cause of self-care deficits   - Assess range of motion  - Assess patient's mobility; develop plan if impaired  - Assess patient's need for assistive devices and provide as appropriate  - Encourage maximum independence but intervene and supervise when necessary  - Involve family in performance of ADLs  - Assess for home care needs following discharge   - Consider OT consult to assist with ADL evaluation and planning for discharge  - Provide patient education as appropriate  Outcome: Progressing  Goal: Maintains/Returns to pre admission functional level  Description: INTERVENTIONS:  - Perform BMAT or MOVE assessment daily    - Set and communicate daily mobility goal to care team and patient/family/caregiver  - Collaborate with rehabilitation services on mobility goals if consulted  - Perform Range of Motion  times a day  - Reposition patient every  hours    - Dangle patient  times a day  - Stand patient  times a day  - Ambulate patient  times a day  - Out of bed to chair  times a day   - Out of bed for meals  times a day  - Out of bed for toileting  - Record patient progress and toleration of activity level   Outcome: Progressing  Goal: Patient will remain free of falls  Description: INTERVENTIONS:  - Educate patient/family on patient safety including physical limitations  - Instruct patient to call for assistance with activity   - Consult OT/PT to assist with strengthening/mobility   - Keep Call bell within reach  - Keep bed low and locked with side rails adjusted as appropriate  - Keep care items and personal belongings within reach  - Initiate and maintain comfort rounds  - Make Fall Risk Sign visible to staff  - Offer Toileting every  Hours, in advance of need  - Initiate/Maintain alarm  - Obtain necessary fall risk management equipment:   - Apply yellow socks and bracelet for high fall risk patients  - Consider moving patient to room near nurses station  Outcome: Progressing     Problem: DISCHARGE PLANNING  Goal: Discharge to home or other facility with appropriate resources  Description: INTERVENTIONS:  - Identify barriers to discharge w/patient and caregiver  - Arrange for needed discharge resources and transportation as appropriate  - Identify discharge learning needs (meds, wound care, etc )  - Arrange for interpretive services to assist at discharge as needed  - Refer to Case Management Department for coordinating discharge planning if the patient needs post-hospital services based on physician/advanced practitioner order or complex needs related to functional status, cognitive ability, or social support system  Outcome: Progressing     Problem: Knowledge Deficit  Goal: Patient/family/caregiver demonstrates understanding of disease process, treatment plan, medications, and discharge instructions  Description: Complete learning assessment and assess knowledge base    Interventions:  - Provide teaching at level of understanding  - Provide teaching via preferred learning methods  Outcome: Progressing

## 2022-04-24 NOTE — UTILIZATION REVIEW
Initial Clinical Review    Admission: Date/Time/Statement:   Admission Orders (From admission, onward)     Ordered        04/23/22 2346  INPATIENT ADMISSION  Once                      Orders Placed This Encounter   Procedures    INPATIENT ADMISSION     Standing Status:   Standing     Number of Occurrences:   1     Order Specific Question:   Level of Care     Answer:   Med Surg [16]     Order Specific Question:   Estimated length of stay     Answer:   More than 2 Midnights     Order Specific Question:   Certification     Answer:   I certify that inpatient services are medically necessary for this patient for a duration of greater than two midnights  See H&P and MD Progress Notes for additional information about the patient's course of treatment  ED Arrival Information     Expected Arrival Acuity    - 4/23/2022 22:18 Emergent         Means of arrival Escorted by Service Admission type    [address] [facility] Emergency         Arrival complaint    shaking        Chief Complaint   Patient presents with    Weakness - Generalized     Patient having weakness and tremors and muscle spasms in all extremities  Patient has a hx of severely low potassium requiring daily IV potassium infusions  Patient stated she just doesnt feel right  Initial Presentation: 44 y o  female to ED presents with severe anxiety and worsening muscle spasms  PMH for Gastric bypass ith malabsorption disorder, increased renal excretion followed by Nephrology who infuse it is 80 mEq of potassium 3 Port-A-Cath daily  Over the past week patient has had hypokalemia on outpatient labs and tried to replete with extra bags of potassium at home  In ED found with potassium of 2 2   Had severe panic attach in ED and given lorazepam  Also with history of Anxiety, Anemia and Chronic pancreatitis  Admit Inpatient level of care for Hypokalemia  Chronic hypokalemia, follows with Nephrology and infuses via port a cath 80 MEQ K+ daily   Use utilize large volume NSS with 40 mEq at 100 mL an hour to provide maintenance potassium dosing and supplement with K riders according to Q 8 hour BMPs for aggressive repletion  Tele monitoring  Nephrology consult  Continue Lexapro, Elavil and Ferrous sulfate  EKG in ED on 4/23 is sinus rhythm  Date: 4/24   Day 2:   Progress notes; Received additional IV potassium supplementation and this morning potassium was improved to 3 6  Still not eating much  High risk of recurrence of hypokalemia  Will recheck potassium again tomorrow  Nephrology consult d/c         ED Triage Vitals   Temperature Pulse Respirations Blood Pressure SpO2   04/23/22 2223 04/23/22 2223 04/23/22 2223 04/23/22 2223 04/23/22 2223   97 9 °F (36 6 °C) 92 18 133/88 98 %      Temp Source Heart Rate Source Patient Position - Orthostatic VS BP Location FiO2 (%)   04/23/22 2223 04/23/22 2223 04/23/22 2223 04/23/22 2223 --   Temporal Monitor Lying Right arm       Pain Score       04/24/22 0035       No Pain          Wt Readings from Last 1 Encounters:   04/23/22 57 6 kg (126 lb 15 8 oz)     Additional Vital Signs:   04/24/22 15:16:30 98 5 °F (36 9 °C) 85 17 114/72 86 98 % -- --   04/24/22 11:07:21 98 6 °F (37 °C) 81 18 115/75 88 97 % None (Room air) --   04/24/22 07:52:41 97 9 °F (36 6 °C) 65 17 113/74 87 100 % -- --   04/24/22 00:54:51 98 1 °F (36 7 °C) 74 18 115/74 88 100 % -- --   04/23/22 2335 -- -- -- -- -- -- None (Room air)      Pertinent Labs/Diagnostic Test Results:   CT head without contrast   Final Result by Byron Rosales MD (04/23 2332)      No acute intracranial abnormality                    Workstation performed: [id number]               Results from last 7 days   Lab Units 04/23/22  2235   WBC Thousand/uL 7 10   HEMOGLOBIN g/dL 14 6   HEMATOCRIT % 43 7   PLATELETS Thousands/uL 315   NEUTROS ABS Thousands/µL 5 43         Lab Units 04/24/22  0547 04/23/22  2235   SODIUM mmol/L 139 136   POTASSIUM mmol/L 3 6 2 2*   CHLORIDE mmol/L 105 96* CO2 mmol/L 25 26   ANION GAP mmol/L 9 14*   BUN mg/dL 10 13   CREATININE mg/dL 0 85 1 12   EGFR ml/min/1 73sq m 87 62   CALCIUM mg/dL 7 3* 8 5   MAGNESIUM mg/dL  --  2 2   PHOSPHORUS mg/dL 3 3  --      Results from last 7 days   Lab Units 04/23/22  2235   AST U/L 27   ALT U/L 23   ALK PHOS U/L 56   TOTAL PROTEIN g/dL 7 9   ALBUMIN g/dL 3 9   TOTAL BILIRUBIN mg/dL 0 46     Results from last 7 days   Lab Units 04/23/22  2222   POC GLUCOSE mg/dl 88     Lab Units 04/24/22  0547 04/23/22  2235   GLUCOSE RANDOM mg/dL 76 100         Results from last 7 days   Lab Units 04/23/22  2235   CK TOTAL U/L 106     Results from last 7 days   Lab Units 04/24/22  0135 04/23/22  2235   HS TNI 0HR ng/L  --  <2   HS TNI 2HR ng/L 2  --    HSTNI D2 ng/L >0  --          Results from last 7 days   Lab Units 04/23/22  2235   PROTIME seconds 11 5*   INR  0 85   PTT seconds 27         Results from last 7 days   Lab Units 04/23/22  2235   NT-PRO BNP pg/mL 96       ED Treatment:   Medication Administration from 04/23/2022 2218 to 04/24/2022 0034       Date/Time Order Dose Route Action     04/23/2022 2240 lactated ringers bolus 1,000 mL 1,000 mL Intravenous New Bag     04/23/2022 2240 potassium chloride (K-DUR,KLOR-CON) CR tablet 40 mEq 40 mEq Oral Given     04/23/2022 2240 LORazepam (ATIVAN) injection 1 mg 1 mg Intravenous Given     04/23/2022 2352 potassium chloride 20 mEq IVPB (premix) 20 mEq Intravenous New Bag        Past Medical History:   Diagnosis Date    C  difficile colitis     DUB (dysfunctional uterine bleeding)     H  pylori infection     Hypertension     Hypokalemia     Left lower quadrant abdominal pain 8/17/2020    Migraine     Psychiatric disorder     Anxiety    Rectal bleeding     Renal disorder     Rhabdomyolysis      Present on Admission:   Hypokalemia   Chronic pancreatitis (HCC)   Anemia      Admitting Diagnosis: Hypokalemia [E87 6]  Anxiety hyperventilation [F41 9, F45 8]  Shaking [R25 1]  Generalized weakness [R53 1]  Age/Sex: 44 y o  female     Admission Orders:  Scheduled Medications:  AMILoride, 5 mg, Oral, Daily  amitriptyline, 25 mg, Oral, HS  calcium carbonate, 1 tablet, Oral, BID With Meals  escitalopram, 20 mg, Oral, HS  ferrous sulfate, 325 mg, Oral, Every Other Day  pancrelipase (Lip-Prot-Amyl), 24,000 Units, Oral, TID With Meals  potassium chloride, 20 mEq, Intravenous, Once    potassium chloride 20 mEq IVPB (premix)  Dose: 20 mEq  Freq: Every 2 hours Route: IV  Last Dose: 20 mEq (04/24/22 0543)  Start: 04/24/22 0345 End: 04/24/22 0743      potassium chloride 20 mEq IVPB (premix)  Dose: 20 mEq  Freq: Once Route: IV  Last Dose: 20 mEq (04/24/22 1440)  Start: 04/24/22 1400    Continuous IV Infusions:  sodium chloride 0 9 % with KCl 40 mEq/L, 100 mL/hr, Intravenous, Continuous      PRN Meds:  acetaminophen, 650 mg, Oral, Q6H PRN  methocarbamol, 500 mg, Oral, Q6H PRN 4/24 x1  ondansetron, 4 mg, Intravenous, Q8H PRN  pancrelipase (Lip-Prot-Amyl), 24,000 Units, Oral, Daily PRN      Continuous cardiac monitoring    Network Utilization Review Department  ATTENTION: Please call with any questions or concerns to [phone] and carefully listen to the prompts so that you are directed to the right person  All voicemails are confidential   Neda Malave all requests for admission clinical reviews, approved or denied determinations and any other requests to dedicated fax number below belonging to the campus where the patient is receiving treatment   List of dedicated fax numbers for the Facilities:  [address] [place] [address] DENIALS (Administrative/Medical Necessity) [phone]   [address] (Maternity/NICU/Pediatrics) [phone]   [address]  [address] [address]  [address] [facility] [phone]     [address] [address] [place] [address] [phone] [place] [address] [phone]

## 2022-04-24 NOTE — ASSESSMENT & PLAN NOTE
· Chronic hypokalemia, follows with Nephrology and infuses via port a cath 80 MEQ K+ daily  · History of increased renal excretion and malabsorption due to bypass surgery  · Potassium has been low the past week on outpatient labs despite trialing extra home IV potassium patient developed severe muscle spasms  · In the ED potassium 2 2 with normal magnesium  · Admitted for repletion  · Will utilize large volume NSS with 40 mEq at 100 mL an hour to provide maintenance potassium dosing and supplement with K riders according to Q 8 hour BMPs for aggressive repletion  · Monitor on telemetry  · Appreciate nephrology consultation

## 2022-04-24 NOTE — ED PROVIDER NOTES
History  Chief Complaint   Patient presents with    Weakness - Generalized     Patient having weakness and tremors and muscle spasms in all extremities  Patient has a hx of severely low potassium requiring daily IV potassium infusions  Patient stated she just doesnt feel right  With them patient is a 19-year-old female with history of chronic hypokalemia for which she receives IV infusions of potassium regularly through a port and history of prior gastric bypass presents emergency department due to generalized not feeling well and shaking and muscle aches all over which started abruptly tonight while she was watching a movie  Patient is tearful and very anxious appearing in the ED and shaky however alert and oriented  History provided by:  Patient      Prior to Admission Medications   Prescriptions Last Dose Informant Patient Reported? Taking? AMILoride 5 mg tablet  Self No No   Sig: Take 1 tablet (5 mg total) by mouth daily   B Complex Vitamins (VITAMIN B COMPLEX PO)   Yes No   Sig: Take 1 capsule by mouth daily     Catheters MISC   No No   Sig: by Does not apply route 2 (two) times a week Port care per [place] protocol   Patient not taking: Reported on 1/4/2022    Syringe/Needle, Disp, (SYRINGE 3CC/25GX5/8") 25G X 5/8" 3 ML MISC   No No   Sig: Use once monthly for B12 injection     amitriptyline (ELAVIL) 10 mg tablet   Yes No   Sig: Take 20 mg by mouth daily at bedtime     cyanocobalamin 1,000 mcg/mL   No No   Sig: Inject 1 mL (1,000 mcg total) into a muscle every 30 (thirty) days   ergocalciferol (ERGOCALCIFEROL) 1 25 MG (24676 UT) capsule   No No   Sig: Take 1 capsule (50,000 Units total) by mouth once a week Mondays and Thursdays   escitalopram (LEXAPRO) 10 mg tablet  Self Yes No   Sig: Take 20 mg by mouth daily at bedtime    ferrous sulfate 325 (65 Fe) mg tablet   Yes No   Sig: Take 325 mg by mouth every other day Last took 3/9/20    lidocaine (Lidoderm) 5 %   No No   Sig: Apply 1 patch topically daily for 7 days Remove & Discard patch within 12 hours or as directed by MD   methocarbamol (ROBAXIN) 500 mg tablet   No No   Sig: Take 1 tablet (500 mg total) by mouth 2 (two) times a day   ondansetron (ZOFRAN) 4 mg tablet   No No   Sig: Take 1 tablet (4 mg total) by mouth every 8 (eight) hours as needed for nausea or vomiting   pancrelipase, Lip-Prot-Amyl, (Creon) 6,000 units delayed release capsule   Yes No   Sig: Take 6,000 units of lipase by mouth 3 (three) times a day with meals     potassium chloride 0 4 mEq/mL   No No   Sig: Infuse 200 mL (80 mEq total) into a venous catheter daily 80 meq in 1 litre bag to be infused daily over 8 hours      Facility-Administered Medications: None       Past Medical History:   Diagnosis Date    H  pylori infection     Hypokalemia     Left lower quadrant abdominal pain 2020    Migraine     Rectal bleeding     Renal disorder     Rhabdomyolysis        Past Surgical History:   Procedure Laterality Date    ABDOMINAL SURGERY      APPENDECTOMY       SECTION      CHOLECYSTECTOMY      COSMETIC SURGERY      "tummy tuck"    [place] GUIDED CENTRAL VENOUS ACCESS DEVICE INSERTION  2018    GASTRIC BYPASS      HYSTERECTOMY      LAPAROSCOPY      CO SURG DIAGNOSTIC EXAM, ANORECTAL N/A 2021    Procedure: ANAL EXAM UNDER ANESTHESIA; HEMORRHOIDECTOMY;  Surgeon: Kya John DO;  Location:  MAIN OR;  Service: General    TUNNELED VENOUS PORT PLACEMENT N/A 2018    Procedure: INSERTION VENOUS PORT (PORT-A-CATH); Surgeon: Magdy Blanca DO;  Location: [facility];  Service: General       Family History   Problem Relation Age of Onset    No Known Problems Mother     Hypertension Father     Diabetes Father     Diabetes Maternal Grandfather      I have reviewed and agree with the history as documented      E-Cigarette/Vaping    E-Cigarette Use Never User      E-Cigarette/Vaping Substances    Nicotine No     THC No     CBD No      Social History     Tobacco Use    Smoking status: Never Smoker    Smokeless tobacco: Never Used   Vaping Use    Vaping Use: Never used   Substance Use Topics    Alcohol use: Never     Alcohol/week: 0 0 standard drinks     Comment: 0    Drug use: Never       Review of Systems   Constitutional: Positive for fatigue  Negative for activity change, appetite change, chills and fever  HENT: Negative for congestion, ear pain, rhinorrhea and sore throat  Eyes: Negative for discharge, redness and visual disturbance  Respiratory: Negative for cough, chest tightness, shortness of breath and wheezing  Cardiovascular: Negative for chest pain and palpitations  Gastrointestinal: Negative for abdominal pain, constipation, diarrhea, nausea and vomiting  Endocrine: Negative for polydipsia and polyuria  Genitourinary: Negative for difficulty urinating, dysuria, frequency, hematuria and urgency  Musculoskeletal: Positive for myalgias  Negative for arthralgias  Skin: Negative for color change, pallor and rash  Neurological: Positive for tremors, weakness and light-headedness  Negative for dizziness, numbness and headaches  Hematological: Negative for adenopathy  Does not bruise/bleed easily  Psychiatric/Behavioral: The patient is nervous/anxious  All other systems reviewed and are negative  Physical Exam  Physical Exam  Vitals and nursing note reviewed  Constitutional:       Appearance: She is well-developed  HENT:      Head: Normocephalic and atraumatic  Right Ear: External ear normal       Left Ear: External ear normal       Nose: Nose normal    Eyes:      Conjunctiva/sclera: Conjunctivae normal       Pupils: Pupils are equal, round, and reactive to light  Cardiovascular:      Rate and Rhythm: Normal rate and regular rhythm  Heart sounds: Normal heart sounds  Pulmonary:      Effort: Pulmonary effort is normal  No respiratory distress  Breath sounds: Normal breath sounds   No wheezing or rales  Chest:      Chest wall: No tenderness  Abdominal:      General: Bowel sounds are normal  There is no distension  Palpations: Abdomen is soft  Tenderness: There is no abdominal tenderness  There is no guarding  Musculoskeletal:         General: Normal range of motion  Cervical back: Normal range of motion and neck supple  Skin:     General: Skin is warm and dry  Neurological:      Mental Status: She is alert and oriented to person, place, and time  Cranial Nerves: No cranial nerve deficit  Sensory: No sensory deficit           Vital Signs  ED Triage Vitals [04/23/22 2223]   Temperature Pulse Respirations Blood Pressure SpO2   97 9 °F (36 6 °C) 92 18 133/88 98 %      Temp Source Heart Rate Source Patient Position - Orthostatic VS BP Location FiO2 (%)   Temporal Monitor Lying Right arm --      Pain Score       --           Vitals:    04/23/22 2223 04/23/22 2300 04/23/22 2330   BP: 133/88 115/61 111/60   Pulse: 92 76 77   Patient Position - Orthostatic VS: Lying           Visual Acuity  Visual Acuity      Most Recent Value   L Pupil Size (mm) 6   R Pupil Size (mm) 6          ED Medications  Medications   lactated ringers infusion (has no administration in time range)   potassium chloride 20 mEq IVPB (premix) (has no administration in time range)   lactated ringers bolus 1,000 mL (1,000 mL Intravenous New Bag 4/23/22 2240)   potassium chloride (K-DUR,KLOR-CON) CR tablet 40 mEq (40 mEq Oral Given 4/23/22 2240)   LORazepam (ATIVAN) injection 1 mg (1 mg Intravenous Given 4/23/22 2240)       Diagnostic Studies  Results Reviewed     Procedure Component Value Units Date/Time    Comprehensive metabolic panel [[id number]]  (Abnormal) Collected: 04/23/22 2235    Lab Status: Final result Specimen: Blood from Arm, Right Updated: 04/23/22 2333     Sodium 136 mmol/L      Potassium 2 2 mmol/L      Chloride 96 mmol/L      CO2 26 mmol/L      ANION GAP 14 mmol/L      BUN 13 mg/dL Creatinine 1 12 mg/dL      Glucose 100 mg/dL      Calcium 8 5 mg/dL      AST 27 U/L      ALT 23 U/L      Alkaline Phosphatase 56 U/L      Total Protein 7 9 g/dL      Albumin 3 9 g/dL      Total Bilirubin 0 46 mg/dL      eGFR 62 ml/min/1 73sq m     Narrative:      [facility] guidelines for Chronic Kidney Disease (CKD):     Stage 1 with normal or high GFR (GFR > 90 mL/min/1 73 square meters)    Stage 2 Mild CKD (GFR = 60-89 mL/min/1 73 square meters)    Stage 3A Moderate CKD (GFR = 45-59 mL/min/1 73 square meters)    Stage 3B Moderate CKD (GFR = 30-44 mL/min/1 73 square meters)    Stage 4 Severe CKD (GFR = 15-29 mL/min/1 73 square meters)    Stage 5 End Stage CKD (GFR <15 mL/min/1 73 square meters)  Note: GFR calculation is accurate only with a steady state creatinine    NT-BNP PRO [[id number]]  (Normal) Collected: 04/23/22 2235    Lab Status: Final result Specimen: Blood from Arm, Right Updated: 04/23/22 2331     NT-proBNP 96 pg/mL     Magnesium [[id number]]  (Normal) Collected: 04/23/22 2235    Lab Status: Final result Specimen: Blood from Arm, Right Updated: 04/23/22 2331     Magnesium 2 2 mg/dL     Protime-INR [[id number]]  (Abnormal) Collected: 04/23/22 2235    Lab Status: Final result Specimen: Blood from Arm, Right Updated: 04/23/22 2331     Protime 11 5 seconds      INR 0 85    APTT [[id number]]  (Normal) Collected: 04/23/22 2235    Lab Status: Final result Specimen: Blood from Arm, Right Updated: 04/23/22 2331     PTT 27 seconds     CK Total with Reflex CKMB [[id number]]  (Normal) Collected: 04/23/22 2235    Lab Status: Final result Specimen: Blood from Arm, Right Updated: 04/23/22 2321     Total  U/L     CBC and differential [[id number]]  (Abnormal) Collected: 04/23/22 2235    Lab Status: Final result Specimen: Blood from Arm, Right Updated: 04/23/22 2303     WBC 7 10 Thousand/uL      RBC 4 88 Million/uL      Hemoglobin 14 6 g/dL      Hematocrit 43 7 %      MCV 90 fL      MCH 29 9 pg      MCHC 33 4 g/dL      RDW 14 4 %      MPV 10 7 fL      Platelets 584 Thousands/uL      nRBC 0 /100 WBCs      Neutrophils Relative 76 %      Immat GRANS % 0 %      Lymphocytes Relative 16 %      Monocytes Relative 7 %      Eosinophils Relative 1 %      Basophils Relative 0 %      Neutrophils Absolute 5 43 Thousands/µL      Immature Grans Absolute 0 02 Thousand/uL      Lymphocytes Absolute 1 12 Thousands/µL      Monocytes Absolute 0 46 Thousand/µL      Eosinophils Absolute 0 04 Thousand/µL      Basophils Absolute 0 03 Thousands/µL     HS Troponin 0hr (reflex protocol) [[id number]] Collected: 04/23/22 2235    Lab Status: In process Specimen: Blood from Arm, Right Updated: 04/23/22 2300    Fingerstick Glucose (POCT) [[id number]]  (Normal) Collected: 04/23/22 2222    Lab Status: Final result Updated: 04/23/22 2228     POC Glucose 88 mg/dl     UA w Reflex to Microscopic w Reflex to Culture [[id number]]     Lab Status: No result Specimen: Urine     POCT pregnancy, urine [[id number]]     Lab Status: No result                  CT head without contrast   Final Result by Arnie El MD (04/23 2332)      No acute intracranial abnormality  Workstation performed: [id number]                    Procedures  ECG 12 Lead Documentation Only    Date/Time: 4/23/2022 10:28 PM  Performed by: Duyen Melo DO  Authorized by:  Duyen Melo DO     ECG reviewed by me, the ED Provider: yes    Patient location:  ED  Previous ECG:     Comparison to cardiac monitor: Yes    Quality:     Tracing quality:  Limited by artifact  Rate:     ECG rate:  91    ECG rate assessment: normal    Rhythm:     Rhythm: sinus rhythm    QRS:     QRS axis:  Normal    QRS intervals:  Normal  Conduction:     Conduction: normal    ST segments:     ST segments:  Non-specific  T waves:     T waves: non-specific               ED Course  ED Course as of 04/23/22 2347   Sat Apr 23, 2022 2347 Spoke with hospitalist on-call [address] [place] [address] reviewed case and findings in the emergency department and management thus far she accepts for admission behalf of Dr Italo Brar  SBIRT 22yo+      Most Recent Value   SBIRT (24 yo +)    In order to provide better care to our patients, we are screening all of our patients for alcohol and drug use  Would it be okay to ask you these screening questions?  No Filed at: 04/23/2022 1615                    [facility]  Number of Diagnoses or Management Options  Anxiety hyperventilation: new and requires workup  Generalized weakness: new and requires workup  Hypokalemia: established and worsening     Amount and/or Complexity of Data Reviewed  Clinical lab tests: ordered and reviewed  Tests in the radiology section of CPT®: ordered and reviewed  Tests in the medicine section of CPT®: ordered and reviewed  Decide to obtain previous medical records or to obtain history from someone other than the patient: yes  Review and summarize past medical records: yes  Independent visualization of images, tracings, or specimens: yes    Risk of Complications, Morbidity, and/or Mortality  Presenting problems: moderate  Diagnostic procedures: moderate  Management options: moderate    Patient Progress  Patient progress: stable      Disposition  Final diagnoses:   Hypokalemia   Generalized weakness   Anxiety hyperventilation     Time reflects when diagnosis was documented in both MDM as applicable and the Disposition within this note     Time User Action Codes Description Comment    4/23/2022 11:41 PM Melody Kline Add [E87 6] Hypokalemia     4/23/2022 11:41 PM Tobin Plasencia Add [R53 1] Generalized weakness     4/23/2022 11:42 PM Melody Kline Add [F41 9,  F45 8] Anxiety hyperventilation       ED Disposition     ED Disposition Condition Date/Time Comment    Admit Stable Sat Apr 23, 2022 11:41 PM Case was discussed with Natalya García and the patient's admission status was agreed to be Admission Status: observation status to the service of   [facility]         Follow-up Information    None         Patient's Medications   Discharge Prescriptions    No medications on file       No discharge procedures on file      PDMP Review       Value Time User    PDMP Reviewed  Yes 3/6/2022 10:38 AM Karina Whitaker MD          ED Provider  Electronically Signed by           Alvaro Quintana DO  04/23/22 1814

## 2022-04-24 NOTE — PLAN OF CARE
Problem: PAIN - ADULT  Goal: Verbalizes/displays adequate comfort level or baseline comfort level  Description: Interventions:  - Encourage patient to monitor pain and request assistance  - Assess pain using appropriate pain scale  - Administer analgesics based on type and severity of pain and evaluate response  - Implement non-pharmacological measures as appropriate and evaluate response  - Consider cultural and social influences on pain and pain management  - Notify physician/advanced practitioner if interventions unsuccessful or patient reports new pain  Outcome: Progressing     Problem: SAFETY ADULT  Goal: Maintain or return to baseline ADL function  Description: INTERVENTIONS:  -  Assess patient's ability to carry out ADLs; assess patient's baseline for ADL function and identify physical deficits which impact ability to perform ADLs (bathing, care of mouth/teeth, toileting, grooming, dressing, etc )  - Assess/evaluate cause of self-care deficits   - Assess range of motion  - Assess patient's mobility; develop plan if impaired  - Assess patient's need for assistive devices and provide as appropriate  - Encourage maximum independence but intervene and supervise when necessary  - Involve family in performance of ADLs  - Assess for home care needs following discharge   - Consider OT consult to assist with ADL evaluation and planning for discharge  - Provide patient education as appropriate  4/24/2022 0121 by García Strauss RN  Outcome: Progressing  4/24/2022 0121 by García Strauss RN  Outcome: Progressing  Goal: Maintains/Returns to pre admission functional level  Description: INTERVENTIONS:  - Perform BMAT or MOVE assessment daily    - Set and communicate daily mobility goal to care team and patient/family/caregiver  - Collaborate with rehabilitation services on mobility goals if consulted  - Perform Range of Motion  times a day  - Reposition patient every  hours    - Dangle patient  times a day  - Stand patient  times a day  - Ambulate patient  times a day  - Out of bed to chair  times a day   - Out of bed for meals  times a day  - Out of bed for toileting  - Record patient progress and toleration of activity level   4/24/2022 0121 by Shonna Savage RN  Outcome: Progressing  4/24/2022 0121 by Shonna Savage RN  Outcome: Progressing  Goal: Patient will remain free of falls  Description: INTERVENTIONS:  - Educate patient/family on patient safety including physical limitations  - Instruct patient to call for assistance with activity   - Consult OT/PT to assist with strengthening/mobility   - Keep Call bell within reach  - Keep bed low and locked with side rails adjusted as appropriate  - Keep care items and personal belongings within reach  - Initiate and maintain comfort rounds  - Make Fall Risk Sign visible to staff  - Offer Toileting every  Hours, in advance of need  - Initiate/Maintain alarm  - Obtain necessary fall risk management equipment:   - Apply yellow socks and bracelet for high fall risk patients  - Consider moving patient to room near nurses station  4/24/2022 0121 by Shonna Savage RN  Outcome: Progressing  4/24/2022 0121 by Shonna Savage RN  Outcome: Progressing

## 2022-04-25 VITALS
HEART RATE: 87 BPM | HEIGHT: 63 IN | BODY MASS INDEX: 22.5 KG/M2 | SYSTOLIC BLOOD PRESSURE: 98 MMHG | OXYGEN SATURATION: 97 % | DIASTOLIC BLOOD PRESSURE: 61 MMHG | TEMPERATURE: 98.3 F | RESPIRATION RATE: 18 BRPM | WEIGHT: 126.98 LBS

## 2022-04-25 LAB
ANION GAP SERPL CALCULATED.3IONS-SCNC: 10 MMOL/L (ref 4–13)
BUN SERPL-MCNC: 10 MG/DL (ref 5–25)
CALCIUM SERPL-MCNC: 7.5 MG/DL (ref 8.3–10.1)
CHLORIDE SERPL-SCNC: 108 MMOL/L (ref 100–108)
CO2 SERPL-SCNC: 20 MMOL/L (ref 21–32)
CREAT SERPL-MCNC: 1.01 MG/DL (ref 0.6–1.3)
GFR SERPL CREATININE-BSD FRML MDRD: 70 ML/MIN/1.73SQ M
GLUCOSE SERPL-MCNC: 78 MG/DL (ref 65–140)
POTASSIUM SERPL-SCNC: 3.7 MMOL/L (ref 3.5–5.3)
SODIUM SERPL-SCNC: 138 MMOL/L (ref 136–145)
VIT B12 SERPL-MCNC: 164 PG/ML (ref 100–900)

## 2022-04-25 PROCEDURE — 80048 BASIC METABOLIC PNL TOTAL CA: CPT | Performed by: FAMILY MEDICINE

## 2022-04-25 PROCEDURE — 82607 VITAMIN B-12: CPT | Performed by: FAMILY MEDICINE

## 2022-04-25 PROCEDURE — 99239 HOSP IP/OBS DSCHRG MGMT >30: CPT | Performed by: FAMILY MEDICINE

## 2022-04-25 RX ORDER — CALCIUM GLUCONATE 20 MG/ML
1 INJECTION, SOLUTION INTRAVENOUS ONCE
Status: COMPLETED | OUTPATIENT
Start: 2022-04-25 | End: 2022-04-25

## 2022-04-25 RX ORDER — CALCIUM CARBONATE 500(1250)
1 TABLET ORAL
Qty: 90 TABLET | Refills: 0 | Status: SHIPPED | OUTPATIENT
Start: 2022-04-25 | End: 2022-07-07

## 2022-04-25 RX ORDER — POTASSIUM CHLORIDE 14.9 MG/ML
20 INJECTION INTRAVENOUS
Status: COMPLETED | OUTPATIENT
Start: 2022-04-25 | End: 2022-04-25

## 2022-04-25 RX ADMIN — METHOCARBAMOL TABLETS 500 MG: 500 TABLET, COATED ORAL at 08:27

## 2022-04-25 RX ADMIN — POTASSIUM CHLORIDE AND SODIUM CHLORIDE 100 ML/HR: 900; 300 INJECTION, SOLUTION INTRAVENOUS at 08:25

## 2022-04-25 RX ADMIN — POTASSIUM CHLORIDE 20 MEQ: 14.9 INJECTION, SOLUTION INTRAVENOUS at 16:07

## 2022-04-25 RX ADMIN — POTASSIUM CHLORIDE 20 MEQ: 14.9 INJECTION, SOLUTION INTRAVENOUS at 01:37

## 2022-04-25 RX ADMIN — PANCRELIPASE 24000 UNITS: 24000; 76000; 120000 CAPSULE, DELAYED RELEASE PELLETS ORAL at 11:48

## 2022-04-25 RX ADMIN — POTASSIUM CHLORIDE 20 MEQ: 14.9 INJECTION, SOLUTION INTRAVENOUS at 09:29

## 2022-04-25 RX ADMIN — CALCIUM 1 TABLET: 500 TABLET ORAL at 08:27

## 2022-04-25 RX ADMIN — PANCRELIPASE 24000 UNITS: 24000; 76000; 120000 CAPSULE, DELAYED RELEASE PELLETS ORAL at 08:27

## 2022-04-25 RX ADMIN — METHOCARBAMOL TABLETS 500 MG: 500 TABLET, COATED ORAL at 14:40

## 2022-04-25 RX ADMIN — POTASSIUM CHLORIDE 20 MEQ: 14.9 INJECTION, SOLUTION INTRAVENOUS at 13:47

## 2022-04-25 RX ADMIN — PANCRELIPASE 24000 UNITS: 24000; 76000; 120000 CAPSULE, DELAYED RELEASE PELLETS ORAL at 16:07

## 2022-04-25 RX ADMIN — AMILORIDE HYDROCHLORIDE 5 MG: 5 TABLET ORAL at 08:28

## 2022-04-25 RX ADMIN — CALCIUM GLUCONATE 1 G: 20 INJECTION, SOLUTION INTRAVENOUS at 09:31

## 2022-04-25 RX ADMIN — CALCIUM 1 TABLET: 500 TABLET ORAL at 16:07

## 2022-04-25 RX ADMIN — POTASSIUM CHLORIDE 20 MEQ: 14.9 INJECTION, SOLUTION INTRAVENOUS at 11:48

## 2022-04-25 NOTE — ASSESSMENT & PLAN NOTE
Patient noted to have mild hypocalcemia with normal albumin levels    Will continue oral calcium supplementation in add 1 dose of IV calcium gluconate as well prior to discharge

## 2022-04-25 NOTE — PLAN OF CARE
Problem: MOBILITY - ADULT  Goal: Maintain or return to baseline ADL function  Description: INTERVENTIONS:  -  Assess patient's ability to carry out ADLs; assess patient's baseline for ADL function and identify physical deficits which impact ability to perform ADLs (bathing, care of mouth/teeth, toileting, grooming, dressing, etc )  - Assess/evaluate cause of self-care deficits   - Assess range of motion  - Assess patient's mobility; develop plan if impaired  - Assess patient's need for assistive devices and provide as appropriate  - Encourage maximum independence but intervene and supervise when necessary  - Involve family in performance of ADLs  - Assess for home care needs following discharge   - Consider OT consult to assist with ADL evaluation and planning for discharge  - Provide patient education as appropriate  Outcome: Progressing  Goal: Maintains/Returns to pre admission functional level  Description: INTERVENTIONS:  - Perform BMAT or MOVE assessment daily    - Set and communicate daily mobility goal to care team and patient/family/caregiver  - Collaborate with rehabilitation services on mobility goals if consulted  - Perform Range of Motion 4 times a day  - Reposition patient every 2 hours    - Dangle patient 3 times a day  - Stand patient 4 times a day  - Ambulate patient 3 times a day  - Out of bed to chair 3 times a day   - Out of bed for meals 3 times a day  - Out of bed for toileting  - Record patient progress and toleration of activity level   Outcome: Progressing     Problem: Potential for Falls  Goal: Patient will remain free of falls  Description: INTERVENTIONS:  - Educate patient/family on patient safety including physical limitations  - Instruct patient to call for assistance with activity   - Consult OT/PT to assist with strengthening/mobility   - Keep Call bell within reach  - Keep bed low and locked with side rails adjusted as appropriate  - Keep care items and personal belongings within reach  - Initiate and maintain comfort rounds  - Make Fall Risk Sign visible to staff  - Offer Toileting every 2 Hours, in advance of need  - Initiate/Maintain bed alarm  - Obtain necessary fall risk management equipment:   - Apply yellow socks and bracelet for high fall risk patients  - Consider moving patient to room near nurses station  Outcome: Progressing     Problem: PAIN - ADULT  Goal: Verbalizes/displays adequate comfort level or baseline comfort level  Description: Interventions:  - Encourage patient to monitor pain and request assistance  - Assess pain using appropriate pain scale  - Administer analgesics based on type and severity of pain and evaluate response  - Implement non-pharmacological measures as appropriate and evaluate response  - Consider cultural and social influences on pain and pain management  - Notify physician/advanced practitioner if interventions unsuccessful or patient reports new pain  Outcome: Progressing     Problem: INFECTION - ADULT  Goal: Absence or prevention of progression during hospitalization  Description: INTERVENTIONS:  - Assess and monitor for signs and symptoms of infection  - Monitor lab/diagnostic results  - Monitor all insertion sites, i e  indwelling lines, tubes, and drains  - Monitor endotracheal if appropriate and nasal secretions for changes in amount and color  - [place] appropriate cooling/warming therapies per order  - Administer medications as ordered  - Instruct and encourage patient and family to use good hand hygiene technique  - Identify and instruct in appropriate isolation precautions for identified infection/condition  Outcome: Progressing  Goal: Absence of fever/infection during neutropenic period  Description: INTERVENTIONS:  - Monitor WBC    Outcome: Progressing     Problem: SAFETY ADULT  Goal: Maintain or return to baseline ADL function  Description: INTERVENTIONS:  -  Assess patient's ability to carry out ADLs; assess patient's baseline for ADL function and identify physical deficits which impact ability to perform ADLs (bathing, care of mouth/teeth, toileting, grooming, dressing, etc )  - Assess/evaluate cause of self-care deficits   - Assess range of motion  - Assess patient's mobility; develop plan if impaired  - Assess patient's need for assistive devices and provide as appropriate  - Encourage maximum independence but intervene and supervise when necessary  - Involve family in performance of ADLs  - Assess for home care needs following discharge   - Consider OT consult to assist with ADL evaluation and planning for discharge  - Provide patient education as appropriate  Outcome: Progressing  Goal: Maintains/Returns to pre admission functional level  Description: INTERVENTIONS:  - Perform BMAT or MOVE assessment daily    - Set and communicate daily mobility goal to care team and patient/family/caregiver  - Collaborate with rehabilitation services on mobility goals if consulted  - Perform Range of Motion  times a day  - Reposition patient every  hours    - Dangle patient  times a day  - Stand patient  times a day  - Ambulate patient  times a day  - Out of bed to chair  times a day   - Out of bed for meals  times a day  - Out of bed for toileting  - Record patient progress and toleration of activity level   Outcome: Progressing  Goal: Patient will remain free of falls  Description: INTERVENTIONS:  - Educate patient/family on patient safety including physical limitations  - Instruct patient to call for assistance with activity   - Consult OT/PT to assist with strengthening/mobility   - Keep Call bell within reach  - Keep bed low and locked with side rails adjusted as appropriate  - Keep care items and personal belongings within reach  - Initiate and maintain comfort rounds  - Make Fall Risk Sign visible to staff  - Offer Toileting every  Hours, in advance of need  - Initiate/Maintain alarm  - Obtain necessary fall risk management equipment:  - Apply yellow socks and bracelet for high fall risk patients  - Consider moving patient to room near nurses station  Outcome: Progressing     Problem: DISCHARGE PLANNING  Goal: Discharge to home or other facility with appropriate resources  Description: INTERVENTIONS:  - Identify barriers to discharge w/patient and caregiver  - Arrange for needed discharge resources and transportation as appropriate  - Identify discharge learning needs (meds, wound care, etc )  - Arrange for interpretive services to assist at discharge as needed  - Refer to Case Management Department for coordinating discharge planning if the patient needs post-hospital services based on physician/advanced practitioner order or complex needs related to functional status, cognitive ability, or social support system  Outcome: Progressing     Problem: Knowledge Deficit  Goal: Patient/family/caregiver demonstrates understanding of disease process, treatment plan, medications, and discharge instructions  Description: Complete learning assessment and assess knowledge base    Interventions:  - Provide teaching at level of understanding  - Provide teaching via preferred learning methods  Outcome: Progressing

## 2022-04-25 NOTE — ASSESSMENT & PLAN NOTE
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

## 2022-04-25 NOTE — DISCHARGE SUMMARY
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  · Potassium has been low the past week on outpatient labs despite trialing extra home IV potassium patient developed severe muscle spasms  Patient had decreased oral intake, some nausea vomiting and noted to have muscle spasms and admitted with acute hypokalemia  Received potassium supplementation and today potassium levels have stabilized  Will give her 80 mEq of IV potassium again as per her daily regimen prior to discharge  Encourage to increase oral intake    Hypocalcemia  Assessment & Plan  Patient noted to have mild hypocalcemia with normal albumin levels  Will continue oral calcium supplementation in add 1 dose of IV calcium gluconate as well prior to discharge    History of anxiety  Assessment & Plan  · Continue PTA Lexapro and Elavil  · Received Ativan x1 in the ER for panic attack    Now improved    Chronic pancreatitis ([address] )  Assessment & Plan  · Continue Pancrease with meals  · Low-fat diet         Anemia  Assessment & Plan  History iron deficiency anemia  Continue ferrous sulfate      Discharging Physician / Practitioner: Rakesh Craig MD  PCP: Lou Anand MD  Admission Date:   Admission Orders (From admission, onward)     Ordered        04/23/22 2346  INPATIENT ADMISSION  Once                      Discharge Date: 04/25/22    Medical Problems             Resolved Problems  Date Reviewed: 4/25/2022    None                Consultations During Hospital Stay:  · None    Procedures Performed:   · None    Significant Findings / Test Results:   CT head without contrast    Result Date: 4/23/2022  Impression: No acute intracranial abnormality  Workstation performed: [id number]     Incidental Findings:   · None     Test Results Pending at Discharge (will require follow up): · None     Outpatient Tests Requested:  · Recommend outpatient follow-up with nephrology and lab testing for hypokalemia as per their protocol    Complications:  None    Reason for Admission:  Hypokalemia    Hospital Course: Alvina Pate is a 44 y o  female patient who originally presented to the hospital on 4/23/2022 due to hypokalemia associated with weakness tremors and muscle spasms in her arms and legs  Patient was found to have potassium of 2 2  Received IV supplementation and started to improve however dropped low again and received some more IV supplementation of potassium and this morning fasting level is stable  Will give her 80 mEq of IV potassium this morning as per her daily regimen also added some IV calcium as she does have low calcium levels and recommended to stay on oral calcium supplementation on discharge  Recommend outpatient follow-up with Nephrology and labs as per their protocol as she has known history of acute on chronic hypokalemia treated with IV potassium        Please see above list of diagnoses and related plan for additional information  Condition at Discharge: good     Discharge Day Visit / Exam:     Subjective:  Patient denies any chest pain or shortness of breath or abdominal pain today  Complains of some numbness and tingling in her hands and feet otherwise feels better  Vitals: Blood Pressure: 104/84 (04/25/22 0807)  Pulse: 81 (04/25/22 0807)  Temperature: 97 9 °F (36 6 °C) (04/25/22 0807)  Temp Source: Oral (04/24/22 0054)  Respirations: 15 (04/25/22 0807)  Height: 5' 3" (160 cm) (04/24/22 0100)  Weight - Scale: 57 6 kg (126 lb 15 8 oz) (04/23/22 2223)  SpO2: 98 % (04/25/22 0807)  Exam:   Physical Exam  Vitals and nursing note reviewed     Constitutional: Appearance: Normal appearance  HENT:      Head: Normocephalic and atraumatic  Right Ear: External ear normal       Left Ear: External ear normal       Nose: Nose normal       Mouth/Throat:      Pharynx: Oropharynx is clear  Eyes:      Pupils: Pupils are equal, round, and reactive to light  Cardiovascular:      Rate and Rhythm: Normal rate and regular rhythm  Heart sounds: Normal heart sounds  Pulmonary:      Effort: Pulmonary effort is normal       Breath sounds: Normal breath sounds  Abdominal:      General: Bowel sounds are normal       Palpations: Abdomen is soft  Tenderness: There is no abdominal tenderness  Musculoskeletal:         General: Normal range of motion  Cervical back: Normal range of motion and neck supple  Skin:     General: Skin is warm and dry  Capillary Refill: Capillary refill takes less than 2 seconds  Neurological:      General: No focal deficit present  Mental Status: She is alert and oriented to person, place, and time  Psychiatric:         Mood and Affect: Mood normal          Discussion with Family:  Will update     Discharge instructions/Information to patient and family:   See after visit summary for information provided to patient and family  Provisions for Follow-Up Care:  See after visit summary for information related to follow-up care and any pertinent home health orders  Disposition:     Home    For Discharges to [facility] SNF:   · Not Applicable to this Patient - Not Applicable to this Patient    Planned Readmission: none     Discharge Statement:  I spent 35 minutes discharging the patient  This time was spent on the day of discharge  I had direct contact with the patient on the day of discharge  Greater than 50% of the total time was spent examining patient, answering all patient questions, arranging and discussing plan of care with patient as well as directly providing post-discharge instructions  Additional time then spent on discharge activities  Discharge Medications:  See after visit summary for reconciled discharge medications provided to patient and family        ** Please Note: This note has been constructed using a voice recognition system **

## 2022-04-25 NOTE — CASE MANAGEMENT
Case Management Discharge Planning Note    Patient name Colletta Nice  Location /-11 MRN [id number]  : 1985 Date 2022       Current Admission Date: 2022  Current Admission Diagnosis:Hypokalemia   Patient Active Problem List    Diagnosis Date Noted    History of anxiety 2022    Intractable vomiting 2022    Acute hyponatremia 2022    Iron deficiency 2021    Other hemorrhoids     Open wound / skin tear of right chest wall 2021    Cellulitis of chest wall 2021    Chronic pancreatitis ([facility] [address] ) 2021    Acute on chronic abdominal pain 2021    Blood creatinine increased compared with prior measurement 2021    Gas pain 2021    Left lower quadrant abdominal pain 2020    Middle ear effusion 2020    Normal anion gap metabolic acidosis     Nausea & vomiting 2019    Vertigo 2019    Stress fracture of right foot with routine healing 2019    Right ovarian cyst 2019    Chest pain 2019    Other intestinal malabsorption 10/05/2018    Gastric bypass status for obesity 10/02/2018    GERD (gastroesophageal reflux disease) 2018    Paresthesia of both lower extremities 08/15/2018    Paresthesias 08/15/2018    Fatigue 2018    Elevated CPK 2018    Vitamin B12 deficiency 2018    Cervical lymphadenopathy 2018    Anemia 07/10/2018    Vitamin D insufficiency 07/10/2018    Hypocalcemia 2018    Hypophosphatemia 2018    Hypokalemia 2018    History of gastric bypass 2018    Migraine without aura and without status migrainosus, not intractable 2018    Left-sided weakness 2018    B12 deficiency 2016    WAGNER (obstructive sleep apnea) 2014    Migraine 2014      LOS (days): 2  Geometric Mean LOS (GMLOS) (days):   Days to GMLOS:     OBJECTIVE:  Risk of Unplanned Readmission Score: 22 Current admission status: Inpatient   Preferred Pharmacy:   [facility], [address] [address]  Phone: [phone] Fax: [phone] [facility] [address]  Phone: [phone] Fax: [phone]    Primary Care Provider: Nicole Don MD    Primary Insurance: [address] [place] [address]  Secondary Insurance:     DISCHARGE DETAILS:     Patient discussed in huddle stable for discharge today after potassium infusion  Cm spoke with patient at bedside she stated she gets weekly home nursing  visit for her  potassium infusions from [facility] via port  Patient declined any other needs

## 2022-04-26 ENCOUNTER — TELEPHONE (OUTPATIENT)
Dept: NEPHROLOGY | Facility: CLINIC | Age: 37
End: 2022-04-26

## 2022-04-26 NOTE — UTILIZATION REVIEW
Notification of Discharge   This is a Notification of Discharge from our facility [address]  Please be advised that this patient has been discharge from our facility  Below you will find the admission and discharge date and time including the patients disposition  UTILIZATION REVIEW CONTACT:  [address] [place]  Utilization   Network Utilization Review Department  Phone: [phone] x carefully listen to the prompts  All voicemails are confidential   Email: [email or web address]     PHYSICIAN ADVISORY SERVICES:  FOR [id number] REVIEW - MEDICAL NECESSITY DENIAL  Phone: [phone]  Fax: [phone]  Email: [email or web address]     PRESENTATION DATE: 4/23/2022 10:22 PM  OBERVATION ADMISSION DATE:   INPATIENT ADMISSION DATE: 4/23/22 11:46 PM   DISCHARGE DATE: 4/25/2022  6:27 PM  DISPOSITION: Home/Self Care Home/Self Care      IMPORTANT INFORMATION:  Send all requests for admission clinical reviews, approved or denied determinations and any other requests to dedicated fax number below belonging to the campus where the patient is receiving treatment   List of dedicated fax numbers:  [address] DENIALS (Administrative/Medical Necessity) [phone]   [address] (Maternity/NICU/Pediatrics) [phone]   [place] Notice [phone]   [address] [facility] [address] [phone]   [address] [place] [address] [phone]   [address] [facility] [address] [place] [address],[address] [phone]   [facility]  [phone]   [address] [place] [address]  [address] [facility] [address] [facility] [phone]

## 2022-04-26 NOTE — TELEPHONE ENCOUNTER
Kevon Calero from [address] called stating patient was in the hospital do to low potassium  He stated patient was on potassium 80 meq  He just wanted to verify that she is to remain on this dosage

## 2022-04-30 ENCOUNTER — APPOINTMENT (EMERGENCY)
Dept: CT IMAGING | Facility: HOSPITAL | Age: 37
End: 2022-04-30
Payer: COMMERCIAL

## 2022-04-30 ENCOUNTER — HOSPITAL ENCOUNTER (EMERGENCY)
Facility: HOSPITAL | Age: 37
End: 2022-05-01
Attending: EMERGENCY MEDICINE | Admitting: EMERGENCY MEDICINE
Payer: COMMERCIAL

## 2022-04-30 ENCOUNTER — HOSPITAL ENCOUNTER (EMERGENCY)
Facility: HOSPITAL | Age: 37
Discharge: HOME | End: 2022-04-30
Attending: EMERGENCY MEDICINE | Admitting: EMERGENCY MEDICINE
Payer: COMMERCIAL

## 2022-04-30 VITALS
WEIGHT: 128.31 LBS | OXYGEN SATURATION: 100 % | TEMPERATURE: 97.1 F | HEART RATE: 93 BPM | SYSTOLIC BLOOD PRESSURE: 118 MMHG | DIASTOLIC BLOOD PRESSURE: 72 MMHG | RESPIRATION RATE: 17 BRPM | BODY MASS INDEX: 22.73 KG/M2

## 2022-04-30 DIAGNOSIS — E87.6 HYPOKALEMIA: ICD-10-CM

## 2022-04-30 DIAGNOSIS — R56.9 SEIZURE-LIKE ACTIVITY (HCC): Primary | ICD-10-CM

## 2022-04-30 DIAGNOSIS — E83.51 HYPOCALCEMIA: ICD-10-CM

## 2022-04-30 PROBLEM — R25.1 EPISODE OF SHAKING: Status: ACTIVE | Noted: 2022-04-30

## 2022-04-30 LAB
ALBUMIN SERPL BCP-MCNC: 2.6 G/DL (ref 3.5–5)
ALBUMIN SERPL BCP-MCNC: 3.1 G/DL (ref 3.5–5)
ALP SERPL-CCNC: 56 U/L (ref 46–116)
ALP SERPL-CCNC: 59 U/L (ref 46–116)
ALT SERPL W P-5'-P-CCNC: 33 U/L (ref 12–78)
ALT SERPL W P-5'-P-CCNC: 37 U/L (ref 12–78)
AMPHETAMINES SERPL QL SCN: NEGATIVE
ANION GAP SERPL CALCULATED.3IONS-SCNC: 9 MMOL/L (ref 4–13)
ANION GAP SERPL CALCULATED.3IONS-SCNC: 9 MMOL/L (ref 4–13)
AST SERPL W P-5'-P-CCNC: 32 U/L (ref 5–45)
AST SERPL W P-5'-P-CCNC: 34 U/L (ref 5–45)
BARBITURATES UR QL: NEGATIVE
BASOPHILS # BLD AUTO: 0.05 THOUSANDS/ΜL (ref 0–0.1)
BASOPHILS # BLD AUTO: 0.05 THOUSANDS/ΜL (ref 0–0.1)
BASOPHILS NFR BLD AUTO: 1 % (ref 0–1)
BASOPHILS NFR BLD AUTO: 1 % (ref 0–1)
BENZODIAZ UR QL: NEGATIVE
BILIRUB DIRECT SERPL-MCNC: 0.03 MG/DL (ref 0–0.2)
BILIRUB SERPL-MCNC: 0.18 MG/DL (ref 0.2–1)
BILIRUB SERPL-MCNC: 0.19 MG/DL (ref 0.2–1)
BILIRUB UR QL STRIP: NEGATIVE
BUN SERPL-MCNC: 10 MG/DL (ref 5–25)
BUN SERPL-MCNC: 9 MG/DL (ref 5–25)
CALCIUM ALBUM COR SERPL-MCNC: 8.8 MG/DL (ref 8.3–10.1)
CALCIUM SERPL-MCNC: 7.5 MG/DL (ref 8.3–10.1)
CALCIUM SERPL-MCNC: 8.1 MG/DL (ref 8.3–10.1)
CARDIAC TROPONIN I PNL SERPL HS: <2 NG/L (ref 8–18)
CHLORIDE SERPL-SCNC: 106 MMOL/L (ref 100–108)
CHLORIDE SERPL-SCNC: 108 MMOL/L (ref 100–108)
CK SERPL-CCNC: 39 U/L (ref 26–192)
CLARITY UR: CLEAR
CO2 SERPL-SCNC: 20 MMOL/L (ref 21–32)
CO2 SERPL-SCNC: 21 MMOL/L (ref 21–32)
COCAINE UR QL: NEGATIVE
COLOR UR: YELLOW
CREAT SERPL-MCNC: 0.92 MG/DL (ref 0.6–1.3)
CREAT SERPL-MCNC: 0.94 MG/DL (ref 0.6–1.3)
EOSINOPHIL # BLD AUTO: 0.11 THOUSAND/ΜL (ref 0–0.61)
EOSINOPHIL # BLD AUTO: 0.17 THOUSAND/ΜL (ref 0–0.61)
EOSINOPHIL NFR BLD AUTO: 2 % (ref 0–6)
EOSINOPHIL NFR BLD AUTO: 2 % (ref 0–6)
ERYTHROCYTE [DISTWIDTH] IN BLOOD BY AUTOMATED COUNT: 14.6 % (ref 11.6–15.1)
ERYTHROCYTE [DISTWIDTH] IN BLOOD BY AUTOMATED COUNT: 14.6 % (ref 11.6–15.1)
ETHANOL SERPL-MCNC: <3 MG/DL (ref 0–3)
EXT PREG TEST URINE: NEGATIVE
EXT. CONTROL ED NAV: NORMAL
FLUAV RNA RESP QL NAA+PROBE: NEGATIVE
FLUBV RNA RESP QL NAA+PROBE: NEGATIVE
GFR SERPL CREATININE-BSD FRML MDRD: 77 ML/MIN/1.73SQ M
GFR SERPL CREATININE-BSD FRML MDRD: 79 ML/MIN/1.73SQ M
GLUCOSE SERPL-MCNC: 63 MG/DL (ref 65–140)
GLUCOSE SERPL-MCNC: 63 MG/DL (ref 65–140)
GLUCOSE SERPL-MCNC: 67 MG/DL (ref 65–140)
GLUCOSE SERPL-MCNC: 69 MG/DL (ref 65–140)
GLUCOSE SERPL-MCNC: 73 MG/DL (ref 65–140)
GLUCOSE SERPL-MCNC: 84 MG/DL (ref 65–140)
GLUCOSE UR STRIP-MCNC: NEGATIVE MG/DL
HCT VFR BLD AUTO: 36.3 % (ref 34.8–46.1)
HCT VFR BLD AUTO: 41.2 % (ref 34.8–46.1)
HGB BLD-MCNC: 11.2 G/DL (ref 11.5–15.4)
HGB BLD-MCNC: 12.9 G/DL (ref 11.5–15.4)
HGB UR QL STRIP.AUTO: NEGATIVE
IMM GRANULOCYTES # BLD AUTO: 0.02 THOUSAND/UL (ref 0–0.2)
IMM GRANULOCYTES # BLD AUTO: 0.02 THOUSAND/UL (ref 0–0.2)
IMM GRANULOCYTES NFR BLD AUTO: 0 % (ref 0–2)
IMM GRANULOCYTES NFR BLD AUTO: 0 % (ref 0–2)
KETONES UR STRIP-MCNC: NEGATIVE MG/DL
LACTATE SERPL-SCNC: 1.5 MMOL/L (ref 0.5–2)
LEUKOCYTE ESTERASE UR QL STRIP: NEGATIVE
LYMPHOCYTES # BLD AUTO: 1.94 THOUSANDS/ΜL (ref 0.6–4.47)
LYMPHOCYTES # BLD AUTO: 1.98 THOUSANDS/ΜL (ref 0.6–4.47)
LYMPHOCYTES NFR BLD AUTO: 25 % (ref 14–44)
LYMPHOCYTES NFR BLD AUTO: 32 % (ref 14–44)
MAGNESIUM SERPL-MCNC: 2 MG/DL (ref 1.6–2.6)
MAGNESIUM SERPL-MCNC: 2.1 MG/DL (ref 1.6–2.6)
MCH RBC QN AUTO: 29 PG (ref 26.8–34.3)
MCH RBC QN AUTO: 29.4 PG (ref 26.8–34.3)
MCHC RBC AUTO-ENTMCNC: 30.9 G/DL (ref 31.4–37.4)
MCHC RBC AUTO-ENTMCNC: 31.3 G/DL (ref 31.4–37.4)
MCV RBC AUTO: 94 FL (ref 82–98)
MCV RBC AUTO: 94 FL (ref 82–98)
METHADONE UR QL: NEGATIVE
MONOCYTES # BLD AUTO: 0.49 THOUSAND/ΜL (ref 0.17–1.22)
MONOCYTES # BLD AUTO: 0.56 THOUSAND/ΜL (ref 0.17–1.22)
MONOCYTES NFR BLD AUTO: 7 % (ref 4–12)
MONOCYTES NFR BLD AUTO: 8 % (ref 4–12)
NEUTROPHILS # BLD AUTO: 3.53 THOUSANDS/ΜL (ref 1.85–7.62)
NEUTROPHILS # BLD AUTO: 5.14 THOUSANDS/ΜL (ref 1.85–7.62)
NEUTS SEG NFR BLD AUTO: 57 % (ref 43–75)
NEUTS SEG NFR BLD AUTO: 65 % (ref 43–75)
NITRITE UR QL STRIP: NEGATIVE
NRBC BLD AUTO-RTO: 0 /100 WBCS
NRBC BLD AUTO-RTO: 0 /100 WBCS
OPIATES UR QL SCN: NEGATIVE
OXYCODONE+OXYMORPHONE UR QL SCN: NEGATIVE
PCP UR QL: NEGATIVE
PH UR STRIP.AUTO: 6 [PH]
PLATELET # BLD AUTO: 267 THOUSANDS/UL (ref 149–390)
PLATELET # BLD AUTO: 270 THOUSANDS/UL (ref 149–390)
PMV BLD AUTO: 10.2 FL (ref 8.9–12.7)
PMV BLD AUTO: 10.5 FL (ref 8.9–12.7)
POTASSIUM SERPL-SCNC: 2.9 MMOL/L (ref 3.5–5.3)
POTASSIUM SERPL-SCNC: 3.8 MMOL/L (ref 3.5–5.3)
PROT SERPL-MCNC: 5.9 G/DL (ref 6.4–8.2)
PROT SERPL-MCNC: 6.8 G/DL (ref 6.4–8.2)
PROT UR STRIP-MCNC: NEGATIVE MG/DL
RBC # BLD AUTO: 3.86 MILLION/UL (ref 3.81–5.12)
RBC # BLD AUTO: 4.39 MILLION/UL (ref 3.81–5.12)
RSV RNA RESP QL NAA+PROBE: NEGATIVE
SARS-COV-2 RNA RESP QL NAA+PROBE: NEGATIVE
SODIUM SERPL-SCNC: 136 MMOL/L (ref 136–145)
SODIUM SERPL-SCNC: 137 MMOL/L (ref 136–145)
SP GR UR STRIP.AUTO: >=1.03 (ref 1–1.03)
THC UR QL: POSITIVE
TSH SERPL DL<=0.05 MIU/L-ACNC: 1.52 UIU/ML (ref 0.45–4.5)
UROBILINOGEN UR QL STRIP.AUTO: 0.2 E.U./DL
WBC # BLD AUTO: 6.14 THOUSAND/UL (ref 4.31–10.16)
WBC # BLD AUTO: 7.92 THOUSAND/UL (ref 4.31–10.16)

## 2022-04-30 PROCEDURE — 99285 EMERGENCY DEPT VISIT HI MDM: CPT | Performed by: EMERGENCY MEDICINE

## 2022-04-30 PROCEDURE — 96365 THER/PROPH/DIAG IV INF INIT: CPT

## 2022-04-30 PROCEDURE — 99285 EMERGENCY DEPT VISIT HI MDM: CPT

## 2022-04-30 PROCEDURE — 82948 REAGENT STRIP/BLOOD GLUCOSE: CPT

## 2022-04-30 PROCEDURE — 93005 ELECTROCARDIOGRAM TRACING: CPT

## 2022-04-30 PROCEDURE — 80053 COMPREHEN METABOLIC PANEL: CPT | Performed by: EMERGENCY MEDICINE

## 2022-04-30 PROCEDURE — 81025 URINE PREGNANCY TEST: CPT | Performed by: EMERGENCY MEDICINE

## 2022-04-30 PROCEDURE — 36415 COLL VENOUS BLD VENIPUNCTURE: CPT | Performed by: EMERGENCY MEDICINE

## 2022-04-30 PROCEDURE — 83605 ASSAY OF LACTIC ACID: CPT | Performed by: EMERGENCY MEDICINE

## 2022-04-30 PROCEDURE — 96374 THER/PROPH/DIAG INJ IV PUSH: CPT

## 2022-04-30 PROCEDURE — 96361 HYDRATE IV INFUSION ADD-ON: CPT

## 2022-04-30 PROCEDURE — 80048 BASIC METABOLIC PNL TOTAL CA: CPT | Performed by: EMERGENCY MEDICINE

## 2022-04-30 PROCEDURE — G1004 CDSM NDSC: HCPCS

## 2022-04-30 PROCEDURE — 80307 DRUG TEST PRSMV CHEM ANLYZR: CPT | Performed by: EMERGENCY MEDICINE

## 2022-04-30 PROCEDURE — 85025 COMPLETE CBC W/AUTO DIFF WBC: CPT | Performed by: EMERGENCY MEDICINE

## 2022-04-30 PROCEDURE — 82550 ASSAY OF CK (CPK): CPT | Performed by: EMERGENCY MEDICINE

## 2022-04-30 PROCEDURE — 99284 EMERGENCY DEPT VISIT MOD MDM: CPT

## 2022-04-30 PROCEDURE — 83735 ASSAY OF MAGNESIUM: CPT | Performed by: EMERGENCY MEDICINE

## 2022-04-30 PROCEDURE — 80076 HEPATIC FUNCTION PANEL: CPT | Performed by: EMERGENCY MEDICINE

## 2022-04-30 PROCEDURE — 81003 URINALYSIS AUTO W/O SCOPE: CPT | Performed by: EMERGENCY MEDICINE

## 2022-04-30 PROCEDURE — 96367 TX/PROPH/DG ADDL SEQ IV INF: CPT

## 2022-04-30 PROCEDURE — 0241U HB NFCT DS VIR RESP RNA 4 TRGT: CPT | Performed by: EMERGENCY MEDICINE

## 2022-04-30 PROCEDURE — 96375 TX/PRO/DX INJ NEW DRUG ADDON: CPT

## 2022-04-30 PROCEDURE — 96366 THER/PROPH/DIAG IV INF ADDON: CPT

## 2022-04-30 PROCEDURE — 70450 CT HEAD/BRAIN W/O DYE: CPT

## 2022-04-30 PROCEDURE — 96368 THER/DIAG CONCURRENT INF: CPT

## 2022-04-30 PROCEDURE — 84484 ASSAY OF TROPONIN QUANT: CPT | Performed by: EMERGENCY MEDICINE

## 2022-04-30 PROCEDURE — 82077 ASSAY SPEC XCP UR&BREATH IA: CPT | Performed by: EMERGENCY MEDICINE

## 2022-04-30 PROCEDURE — 84443 ASSAY THYROID STIM HORMONE: CPT | Performed by: EMERGENCY MEDICINE

## 2022-04-30 RX ORDER — MAGNESIUM SULFATE 1 G/100ML
1 INJECTION INTRAVENOUS ONCE
Status: COMPLETED | OUTPATIENT
Start: 2022-04-30 | End: 2022-04-30

## 2022-04-30 RX ORDER — DEXTROSE MONOHYDRATE 25 G/50ML
25 INJECTION, SOLUTION INTRAVENOUS ONCE
Status: COMPLETED | OUTPATIENT
Start: 2022-04-30 | End: 2022-04-30

## 2022-04-30 RX ORDER — DEXTROSE, SODIUM CHLORIDE, AND POTASSIUM CHLORIDE 5; .45; .3 G/100ML; G/100ML; G/100ML
125 INJECTION INTRAVENOUS CONTINUOUS
Status: DISCONTINUED | OUTPATIENT
Start: 2022-04-30 | End: 2022-05-01 | Stop reason: HOSPADM

## 2022-04-30 RX ORDER — DIPHENHYDRAMINE HYDROCHLORIDE 50 MG/ML
50 INJECTION INTRAMUSCULAR; INTRAVENOUS ONCE
Status: COMPLETED | OUTPATIENT
Start: 2022-04-30 | End: 2022-04-30

## 2022-04-30 RX ORDER — POTASSIUM CHLORIDE 20 MEQ/1
40 TABLET, EXTENDED RELEASE ORAL ONCE
Status: COMPLETED | OUTPATIENT
Start: 2022-04-30 | End: 2022-04-30

## 2022-04-30 RX ORDER — CALCIUM GLUCONATE 94 MG/ML
INJECTION, SOLUTION INTRAVENOUS
Status: COMPLETED
Start: 2022-04-30 | End: 2022-04-30

## 2022-04-30 RX ORDER — POTASSIUM CHLORIDE 14.9 MG/ML
20 INJECTION INTRAVENOUS ONCE
Status: COMPLETED | OUTPATIENT
Start: 2022-04-30 | End: 2022-04-30

## 2022-04-30 RX ORDER — LORAZEPAM 2 MG/ML
1 INJECTION INTRAMUSCULAR ONCE
Status: COMPLETED | OUTPATIENT
Start: 2022-04-30 | End: 2022-04-30

## 2022-04-30 RX ADMIN — MAGNESIUM SULFATE HEPTAHYDRATE 1 G: 1 INJECTION, SOLUTION INTRAVENOUS at 18:52

## 2022-04-30 RX ADMIN — DIPHENHYDRAMINE HYDROCHLORIDE 50 MG: 50 INJECTION, SOLUTION INTRAMUSCULAR; INTRAVENOUS at 17:15

## 2022-04-30 RX ADMIN — CALCIUM GLUCONATE 3 G: 94 INJECTION, SOLUTION INTRAVENOUS at 21:27

## 2022-04-30 RX ADMIN — SODIUM CHLORIDE 1000 ML: 0.9 INJECTION, SOLUTION INTRAVENOUS at 09:05

## 2022-04-30 RX ADMIN — DEXTROSE, SODIUM CHLORIDE, AND POTASSIUM CHLORIDE 125 ML/HR: 5; .45; .3 INJECTION INTRAVENOUS at 18:22

## 2022-04-30 RX ADMIN — SODIUM CHLORIDE 1000 ML: 0.9 INJECTION, SOLUTION INTRAVENOUS at 10:58

## 2022-04-30 RX ADMIN — LORAZEPAM 1 MG: 2 INJECTION INTRAMUSCULAR; INTRAVENOUS at 09:06

## 2022-04-30 RX ADMIN — POTASSIUM CHLORIDE 40 MEQ: 20 TABLET, EXTENDED RELEASE ORAL at 20:30

## 2022-04-30 RX ADMIN — DEXTROSE MONOHYDRATE 25 ML: 25 INJECTION, SOLUTION INTRAVENOUS at 17:58

## 2022-04-30 RX ADMIN — POTASSIUM CHLORIDE 20 MEQ: 14.9 INJECTION, SOLUTION INTRAVENOUS at 20:30

## 2022-04-30 NOTE — ED PROVIDER NOTES
History  Chief Complaint   Patient presents with    Seizure - Prior Hx Of      states she had had multiples seizres lasting about 5 minutes each  states she was here last week for same  was here Saturday and her potassium was low (2 7) and attributed it to that  59-year-old female with past medical history pertinent for hypokalemia, anxiety, gastric bypass, cholecystectomy who presents to the emergency department for concerns for possible seizure in contractures on the left upper extremity, similar to episodes that patient had on Saturday last week and on Thursday   is here with patient who states that last Saturday they came for evaluation here in noted the patient's potassium was 2 7  Patient does take potassium regularly through her port and to get last night  States takes 80 mEq daily  Today patient's  states that patient had 4 episodes each about 5 minutes long without any loss of consciousness  Patient was awake and conscious during each episode and he reports the patient had contractures in her left upper extremity mostly  Denies any other concerns  Prior to Admission Medications   Prescriptions Last Dose Informant Patient Reported? Taking? AMILoride 5 mg tablet  Self No No   Sig: Take 1 tablet (5 mg total) by mouth daily   B Complex Vitamins (VITAMIN B COMPLEX PO)   Yes No   Sig: Take 1 capsule by mouth daily     Catheters MISC   No No   Sig: by Does not apply route 2 (two) times a week Port care per [facility] protocol   Patient not taking: Reported on 1/4/2022    Syringe/Needle, Disp, (SYRINGE 3CC/25GX5/8") 25G X 5/8" 3 ML MISC   No No   Sig: Use once monthly for B12 injection     amitriptyline (ELAVIL) 10 mg tablet   Yes No   Sig: Take 20 mg by mouth daily at bedtime     calcium carbonate (OYSTER SHELL,OSCAL) 500 mg   No No   Sig: Take 1 tablet by mouth 3 (three) times a day with meals   cyanocobalamin 1,000 mcg/mL   No No   Sig: Inject 1 mL (1,000 mcg total) into a muscle every 30 (thirty) days   ergocalciferol (ERGOCALCIFEROL) 1 25 MG (33048 UT) capsule   No No   Sig: Take 1 capsule (50,000 Units total) by mouth once a week  and    escitalopram (LEXAPRO) 10 mg tablet  Self Yes No   Sig: Take 20 mg by mouth daily at bedtime    ferrous sulfate 325 (65 Fe) mg tablet   Yes No   Sig: Take 325 mg by mouth every other day Last took 3/9/20    lidocaine (Lidoderm) 5 %   No No   Sig: Apply 1 patch topically daily for 7 days Remove & Discard patch within 12 hours or as directed by MD   methocarbamol (ROBAXIN) 500 mg tablet   No No   Sig: Take 1 tablet (500 mg total) by mouth 2 (two) times a day   ondansetron (ZOFRAN) 4 mg tablet   No No   Sig: Take 1 tablet (4 mg total) by mouth every 8 (eight) hours as needed for nausea or vomiting   pancrelipase, Lip-Prot-Amyl, (Creon) 24,000 units   Yes No   Sig: Take by mouth see administration instructions 3 capsules TID W meal and 1 capsules with Snack    potassium chloride 0 4 mEq/mL   No No   Sig: Infuse 200 mL (80 mEq total) into a venous catheter daily 80 meq in 1 litre bag to be infused daily over 8 hours      Facility-Administered Medications: None       Past Medical History:   Diagnosis Date    C  difficile colitis     DUB (dysfunctional uterine bleeding)     H  pylori infection     Hypertension     Hypokalemia     Left lower quadrant abdominal pain 2020    Migraine     Psychiatric disorder     Anxiety    Rectal bleeding     Renal disorder     Rhabdomyolysis        Past Surgical History:   Procedure Laterality Date    ABDOMINAL SURGERY      APPENDECTOMY       SECTION      CHOLECYSTECTOMY      COSMETIC SURGERY      "tummy tuck"    [place] GUIDED CENTRAL VENOUS ACCESS DEVICE INSERTION  2018    GASTRIC BYPASS      HYSTERECTOMY      LAPAROSCOPY      [place] SURG DIAGNOSTIC EXAM, ANORECTAL N/A 2021    Procedure: ANAL EXAM UNDER ANESTHESIA; HEMORRHOIDECTOMY;  Surgeon: Jonn Ho ;  Location:  MAIN OR;  Service: General    TUNNELED VENOUS PORT PLACEMENT N/A 12/21/2018    Procedure: INSERTION VENOUS PORT (PORT-A-CATH); Surgeon: Chelita Castillo DO;  Location: [facility];  Service: General       Family History   Problem Relation Age of Onset    No Known Problems Mother     Hypertension Father     Diabetes Father     Diabetes Maternal Grandfather      I have reviewed and agree with the history as documented  E-Cigarette/Vaping    E-Cigarette Use Never User      E-Cigarette/Vaping Substances     Social History     Tobacco Use    Smoking status: Never Smoker    Smokeless tobacco: Never Used   Vaping Use    Vaping Use: Never used   Substance Use Topics    Alcohol use: Never     Alcohol/week: 0 0 standard drinks     Comment: 0    Drug use: Never       Review of Systems   Constitutional: Negative for activity change, appetite change, chills and fever  HENT: Negative for congestion, rhinorrhea and sore throat  Eyes: Negative for visual disturbance  Respiratory: Negative for chest tightness, shortness of breath and wheezing  Cardiovascular: Negative for chest pain, palpitations and leg swelling  Gastrointestinal: Negative for abdominal pain, constipation, diarrhea, nausea and vomiting  Genitourinary: Negative for dysuria, flank pain and pelvic pain  Musculoskeletal: Negative for back pain and neck pain  Skin: Negative for rash  Neurological: Positive for seizures  Negative for dizziness, tremors, syncope, facial asymmetry, speech difficulty, weakness, light-headedness, numbness and headaches  Psychiatric/Behavioral: Negative for behavioral problems  The patient is nervous/anxious  Physical Exam  Physical Exam  Vitals and nursing note reviewed  Constitutional:       General: She is not in acute distress  Appearance: She is well-developed  She is not diaphoretic  HENT:      Head: Normocephalic and atraumatic        Right Ear: External ear normal  Left Ear: External ear normal       Nose: Nose normal       Mouth/Throat:      Mouth: Mucous membranes are moist       Comments: No tongue bite noted  Eyes:      Pupils: Pupils are equal, round, and reactive to light  Cardiovascular:      Rate and Rhythm: Normal rate and regular rhythm  Pulses: Normal pulses  Heart sounds: Normal heart sounds  Pulmonary:      Effort: Pulmonary effort is normal  No respiratory distress  Breath sounds: Normal breath sounds  No wheezing or rales  Abdominal:      General: Bowel sounds are normal       Palpations: Abdomen is soft  Tenderness: There is no abdominal tenderness  Genitourinary:     Comments: No urinary incontinence noted  Musculoskeletal:         General: No tenderness or deformity  Normal range of motion  Cervical back: Normal range of motion and neck supple  Skin:     General: Skin is warm and dry  Capillary Refill: Capillary refill takes less than 2 seconds  Neurological:      General: No focal deficit present  Mental Status: She is alert and oriented to person, place, and time  Cranial Nerves: No cranial nerve deficit  Sensory: No sensory deficit  Motor: No abnormal muscle tone  Coordination: Coordination normal       Comments: Patient is awake and alert and oriented x 3  No apparent deficits of memory or concentration  Speech is fluent  Fund of knowledge is appropriate  CN II - No field cuts by confrontation  CN III, IV, VI - pupils are 3 mm and briskly reactive  EOMs are full with no nystagmus  CN V - facial sensation is intact  CN VII - no facial asymmetry  CN VIII - auditory acuity is grossly intact to finger rub bilaterally  CN IX - palate rises symmetrically  CN XI - SCM and trapezius muscles are intact  CN XII - tongue protrudes midline  Sensory exam in intact to light touch, pinprick in all dermatomes tested  Coordination is grossly intact for finger-to-nose    5/5 strength in all extremities           Vital Signs  ED Triage Vitals [04/30/22 0859]   Temperature Pulse Respirations Blood Pressure SpO2   (!) 97 1 °F (36 2 °C) 98 18 131/89 100 %      Temp Source Heart Rate Source Patient Position - Orthostatic VS BP Location FiO2 (%)   Temporal Monitor Sitting Left arm --      Pain Score       No Pain           Vitals:    04/30/22 1100 04/30/22 1115 04/30/22 1130 04/30/22 1200   BP: 105/52 114/59 112/61 118/72   Pulse: 74 71 75 93   Patient Position - Orthostatic VS:             Visual Acuity  Visual Acuity      Most Recent Value   L Pupil Size (mm) 3   R Pupil Size (mm) 3          ED Medications  Medications   LORazepam (ATIVAN) injection 1 mg (1 mg Intravenous Given 4/30/22 0906)   sodium chloride 0 9 % bolus 1,000 mL (0 mL Intravenous Stopped 4/30/22 1009)   sodium chloride 0 9 % bolus 1,000 mL (1,000 mL Intravenous New Bag 4/30/22 1058)       Diagnostic Studies  Results Reviewed     Procedure Component Value Units Date/Time    POCT pregnancy, urine [[id number]]  (Normal) Resulted: 04/30/22 1213    Lab Status: Final result Specimen: Urine Updated: 04/30/22 1213     EXT PREG TEST UR (Ref: Negative) Negative     Control Valid    Fingerstick Glucose (POCT) [[id number]]  (Normal) Collected: 04/30/22 1018    Lab Status: Final result Updated: 04/30/22 1021     POC Glucose 73 mg/dl     Fingerstick Glucose (POCT) [[id number]]  (Normal) Collected: 04/30/22 0948    Lab Status: Final result Updated: 04/30/22 0949     POC Glucose 67 mg/dl     Magnesium [[id number]]  (Normal) Collected: 04/30/22 0904    Lab Status: Final result Specimen: Blood from Arm, Left Updated: 04/30/22 0938     Magnesium 2 1 mg/dL     Comprehensive metabolic panel [[id number]]  (Abnormal) Collected: 04/30/22 0904    Lab Status: Final result Specimen: Blood from Arm, Left Updated: 04/30/22 0936     Sodium 137 mmol/L      Potassium 3 8 mmol/L      Chloride 108 mmol/L      CO2 20 mmol/L      ANION GAP 9 mmol/L      BUN 9 mg/dL Creatinine 0 94 mg/dL      Glucose 69 mg/dL      Calcium 8 1 mg/dL      Corrected Calcium 8 8 mg/dL      AST 34 U/L      ALT 37 U/L      Alkaline Phosphatase 56 U/L      Total Protein 6 8 g/dL      Albumin 3 1 g/dL      Total Bilirubin 0 18 mg/dL      eGFR 77 ml/min/1 73sq m     Narrative:      [place] guidelines for Chronic Kidney Disease (CKD):     Stage 1 with normal or high GFR (GFR > 90 mL/min/1 73 square meters)    Stage 2 Mild CKD (GFR = 60-89 mL/min/1 73 square meters)    Stage 3A Moderate CKD (GFR = 45-59 mL/min/1 73 square meters)    Stage 3B Moderate CKD (GFR = 30-44 mL/min/1 73 square meters)    Stage 4 Severe CKD (GFR = 15-29 mL/min/1 73 square meters)    Stage 5 End Stage CKD (GFR <15 mL/min/1 73 square meters)  Note: GFR calculation is accurate only with a steady state creatinine    Lactic acid, plasma [[id number]]  (Normal) Collected: 04/30/22 0904    Lab Status: Final result Specimen: Blood from Arm, Left Updated: 04/30/22 0935     LACTIC ACID 1 5 mmol/L     Narrative:      Result may be elevated if tourniquet was used during collection      Ethanol [[id number]]  (Normal) Collected: 04/30/22 0904    Lab Status: Final result Specimen: Blood from Arm, Left Updated: 04/30/22 0926     Ethanol Lvl <3 mg/dL     CBC and differential [[id number]]  (Abnormal) Collected: 04/30/22 0904    Lab Status: Final result Specimen: Blood from Arm, Left Updated: 04/30/22 0912     WBC 6 14 Thousand/uL      RBC 4 39 Million/uL      Hemoglobin 12 9 g/dL      Hematocrit 41 2 %      MCV 94 fL      MCH 29 4 pg      MCHC 31 3 g/dL      RDW 14 6 %      MPV 10 2 fL      Platelets 800 Thousands/uL      nRBC 0 /100 WBCs      Neutrophils Relative 57 %      Immat GRANS % 0 %      Lymphocytes Relative 32 %      Monocytes Relative 8 %      Eosinophils Relative 2 %      Basophils Relative 1 %      Neutrophils Absolute 3 53 Thousands/µL      Immature Grans Absolute 0 02 Thousand/uL      Lymphocytes Absolute 1 94 Thousands/µL      Monocytes Absolute 0 49 Thousand/µL      Eosinophils Absolute 0 11 Thousand/µL      Basophils Absolute 0 05 Thousands/µL     Fingerstick Glucose (POCT) [[id number]]  (Abnormal) Collected: 04/30/22 0909    Lab Status: Final result Updated: 04/30/22 0911     POC Glucose 63 mg/dl                  No orders to display              Procedures  ECG 12 Lead Documentation Only    Date/Time: 4/30/2022 9:00 AM  Performed by: Rojelio Patino MD  Authorized by: Rojelio Patino MD     ECG reviewed by me, the ED Provider: yes    Patient location:  ED  Previous ECG:     Previous ECG:  Compared to current    Similarity:  No change  Interpretation:     Interpretation: normal    Quality:     Tracing quality:  Limited by artifact  Rate:     ECG rate:  89    ECG rate assessment: normal    Rhythm:     Rhythm: sinus rhythm    Ectopy:     Ectopy: PAC    QRS:     QRS axis:  Normal  Conduction:     Conduction: normal    ST segments:     ST segments:  Normal  T waves:     T waves: normal               ED Course            RESULTS:  Results Reviewed     Procedure Component Value Units Date/Time    POCT pregnancy, urine [[id number]]  (Normal) Resulted: 04/30/22 1213    Lab Status: Final result Specimen: Urine Updated: 04/30/22 1213     EXT PREG TEST UR (Ref: Negative) Negative     Control Valid    Fingerstick Glucose (POCT) [[id number]]  (Normal) Collected: 04/30/22 1018    Lab Status: Final result Updated: 04/30/22 1021     POC Glucose 73 mg/dl     Fingerstick Glucose (POCT) [[id number]]  (Normal) Collected: 04/30/22 0948    Lab Status: Final result Updated: 04/30/22 0949     POC Glucose 67 mg/dl     Magnesium [[id number]]  (Normal) Collected: 04/30/22 0904    Lab Status: Final result Specimen: Blood from Arm, Left Updated: 04/30/22 0938     Magnesium 2 1 mg/dL     Comprehensive metabolic panel [[id number]]  (Abnormal) Collected: 04/30/22 0904    Lab Status: Final result Specimen: Blood from Arm, Left Updated: 04/30/22 8844 Sodium 137 mmol/L      Potassium 3 8 mmol/L      Chloride 108 mmol/L      CO2 20 mmol/L      ANION GAP 9 mmol/L      BUN 9 mg/dL      Creatinine 0 94 mg/dL      Glucose 69 mg/dL      Calcium 8 1 mg/dL      Corrected Calcium 8 8 mg/dL      AST 34 U/L      ALT 37 U/L      Alkaline Phosphatase 56 U/L      Total Protein 6 8 g/dL      Albumin 3 1 g/dL      Total Bilirubin 0 18 mg/dL      eGFR 77 ml/min/1 73sq m     Narrative:      [place] guidelines for Chronic Kidney Disease (CKD):     Stage 1 with normal or high GFR (GFR > 90 mL/min/1 73 square meters)    Stage 2 Mild CKD (GFR = 60-89 mL/min/1 73 square meters)    Stage 3A Moderate CKD (GFR = 45-59 mL/min/1 73 square meters)    Stage 3B Moderate CKD (GFR = 30-44 mL/min/1 73 square meters)    Stage 4 Severe CKD (GFR = 15-29 mL/min/1 73 square meters)    Stage 5 End Stage CKD (GFR <15 mL/min/1 73 square meters)  Note: GFR calculation is accurate only with a steady state creatinine    Lactic acid, plasma [[id number]]  (Normal) Collected: 04/30/22 0904    Lab Status: Final result Specimen: Blood from Arm, Left Updated: 04/30/22 0935     LACTIC ACID 1 5 mmol/L     Narrative:      Result may be elevated if tourniquet was used during collection      Ethanol [[id number]]  (Normal) Collected: 04/30/22 0904    Lab Status: Final result Specimen: Blood from Arm, Left Updated: 04/30/22 0926     Ethanol Lvl <3 mg/dL     CBC and differential [[id number]]  (Abnormal) Collected: 04/30/22 0904    Lab Status: Final result Specimen: Blood from Arm, Left Updated: 04/30/22 0912     WBC 6 14 Thousand/uL      RBC 4 39 Million/uL      Hemoglobin 12 9 g/dL      Hematocrit 41 2 %      MCV 94 fL      MCH 29 4 pg      MCHC 31 3 g/dL      RDW 14 6 %      MPV 10 2 fL      Platelets 895 Thousands/uL      nRBC 0 /100 WBCs      Neutrophils Relative 57 %      Immat GRANS % 0 %      Lymphocytes Relative 32 %      Monocytes Relative 8 %      Eosinophils Relative 2 % Basophils Relative 1 %      Neutrophils Absolute 3 53 Thousands/µL      Immature Grans Absolute 0 02 Thousand/uL      Lymphocytes Absolute 1 94 Thousands/µL      Monocytes Absolute 0 49 Thousand/µL      Eosinophils Absolute 0 11 Thousand/µL      Basophils Absolute 0 05 Thousands/µL     Fingerstick Glucose (POCT) [[id number]]  (Abnormal) Collected: 04/30/22 0909    Lab Status: Final result Updated: 04/30/22 0911     POC Glucose 63 mg/dl           No orders to display       Vitals:    04/30/22 1100 04/30/22 1115 04/30/22 1130 04/30/22 1200   BP: 105/52 114/59 112/61 118/72   TempSrc:       Pulse: 74 71 75 93   Resp: 17 14 14 17   Patient Position - Orthostatic VS:       Temp:               MDM  Number of Diagnoses or Management Options  Seizure-like activity (HCC)  Diagnosis management comments: 74-year-old female with past medical history pertinent for hypokalemia, anxiety, gastric bypass, cholecystectomy who presents to the emergency department for concerns for possible seizure  Vital signs on arrival here were notable for no significant abnormalities  Patient's exam here is not concerning with no from bite or urinary incontinence  History obtained by  does not fit a seizures as patient has not been ictal   Lab work completed today showed no elevation of the lactic acid to raising suspicion for a seizure  Electrolytes were within normal limits today  I advised that they follow-up with neurology for possible EEG for further assessment  Patient and  were agreeable to outpatient care at this time  Understands return precautions         Amount and/or Complexity of Data Reviewed  Clinical lab tests: ordered and reviewed  Obtain history from someone other than the patient: yes  Review and summarize past medical records: yes    Risk of Complications, Morbidity, and/or Mortality  Presenting problems: moderate  Diagnostic procedures: moderate  Management options: moderate        Disposition  Final diagnoses:   Seizure-like activity ([facility] )     Time reflects when diagnosis was documented in both MDM as applicable and the Disposition within this note     Time User Action Codes Description Comment    4/30/2022  9:01 AM Joaquin Mcmahon Add [R56 9] Seizure ([facility] [address] )     4/30/2022  9:01 AM Joaquin Mcmahon Remove [R56 9] Seizure ([facility] [address] )     4/30/2022  9:01 AM Joaquin Mcmahon Add [R56 9] Seizure-like activity [facility])       ED Disposition     ED Disposition Condition Date/Time Comment    Discharge Stable Sat Apr 30, 2022  9:01 AM Sarah Narayan discharge to home/self care              Follow-up Information     Follow up With Specialties Details Why [address] Jake Babb IV, MD [facility]   [address]  [address] [address]  [phone]      Neurology              Patient's Medications   Discharge Prescriptions    No medications on file           PDMP Review       Value Time User    PDMP Reviewed  Yes 3/6/2022 10:38 AM Tasha Mcgee MD          ED Provider  Electronically Signed by           Natalya Patel MD  04/30/22 0151

## 2022-04-30 NOTE — ED PROVIDER NOTES
History  Chief Complaint   Patient presents with    Seizure - Actively Seizing on Arrival     pt seizing on arrival to er  [place] [facility] [place]  [place] responding to staff during seizure     34-year-old right-hand-dominant female with history of hypertension hypokalemia anxiety gastric bypass cholecystectomy hysterectomy; presents for re-evaluation secondary to jerking all over her body as well as the left upper extremity  states that this initially started on 04/23 she was admitted for low potassium this was repleted in it did not recur until 428  Patient declined to go to the hospital at that time it again occurred this morning she was evaluated at [facility] department they recheck her  Electrolytes potassium was 3 8; there is no history of any fever cough or upper respiratory complaints patient is able to answer questions throughout these episodes there has been no new medications  No history of any trauma or falls she denies any shortness of breath chest pain no nausea vomiting diarrhea no abdominal pain no numbness tingling or weakness  Prior to Admission Medications   Prescriptions Last Dose Informant Patient Reported? Taking? AMILoride 5 mg tablet  Self No No   Sig: Take 1 tablet (5 mg total) by mouth daily   B Complex Vitamins (VITAMIN B COMPLEX PO)   Yes No   Sig: Take 1 capsule by mouth daily     Catheters MISC   No No   Sig: by Does not apply route 2 (two) times a week Port care per [place] protocol   Patient not taking: Reported on 1/4/2022    Syringe/Needle, Disp, (SYRINGE 3CC/25GX5/8") 25G X 5/8" 3 ML MISC   No No   Sig: Use once monthly for B12 injection     amitriptyline (ELAVIL) 10 mg tablet   Yes No   Sig: Take 20 mg by mouth daily at bedtime     calcium carbonate (OYSTER SHELL,OSCAL) 500 mg   No No   Sig: Take 1 tablet by mouth 3 (three) times a day with meals   cyanocobalamin 1,000 mcg/mL   No No   Sig: Inject 1 mL (1,000 mcg total) into a muscle every 30 (thirty) days   ergocalciferol (ERGOCALCIFEROL) 1 25 MG (07458 UT) capsule   No No   Sig: Take 1 capsule (50,000 Units total) by mouth once a week  and    escitalopram (LEXAPRO) 10 mg tablet  Self Yes No   Sig: Take 20 mg by mouth daily at bedtime    ferrous sulfate 325 (65 Fe) mg tablet   Yes No   Sig: Take 325 mg by mouth every other day Last took 3/9/20    lidocaine (Lidoderm) 5 %   No No   Sig: Apply 1 patch topically daily for 7 days Remove & Discard patch within 12 hours or as directed by MD   methocarbamol (ROBAXIN) 500 mg tablet   No No   Sig: Take 1 tablet (500 mg total) by mouth 2 (two) times a day   ondansetron (ZOFRAN) 4 mg tablet   No No   Sig: Take 1 tablet (4 mg total) by mouth every 8 (eight) hours as needed for nausea or vomiting   pancrelipase, Lip-Prot-Amyl, (Creon) 24,000 units   Yes No   Sig: Take by mouth see administration instructions 3 capsules TID W meal and 1 capsules with Snack    potassium chloride 0 4 mEq/mL   No No   Sig: Infuse 200 mL (80 mEq total) into a venous catheter daily 80 meq in 1 litre bag to be infused daily over 8 hours      Facility-Administered Medications: None       Past Medical History:   Diagnosis Date    C  difficile colitis     DUB (dysfunctional uterine bleeding)     H  pylori infection     Hypertension     Hypokalemia     Left lower quadrant abdominal pain 2020    Migraine     Psychiatric disorder     Anxiety    Rectal bleeding     Renal disorder     Rhabdomyolysis        Past Surgical History:   Procedure Laterality Date    ABDOMINAL SURGERY      APPENDECTOMY       SECTION      CHOLECYSTECTOMY      COSMETIC SURGERY      "tummy tuck"    FL GUIDED CENTRAL VENOUS ACCESS DEVICE INSERTION  2018    GASTRIC BYPASS      HYSTERECTOMY      LAPAROSCOPY      MO SURG DIAGNOSTIC EXAM, ANORECTAL N/A 2021    Procedure: ANAL EXAM UNDER ANESTHESIA; HEMORRHOIDECTOMY;  Surgeon: Graham Winkler DO;  Location:  MAIN OR;  Service: General    TUNNELED VENOUS PORT PLACEMENT N/A 12/21/2018    Procedure: INSERTION VENOUS PORT (PORT-A-CATH); Surgeon: Cristina Andrews DO;  Location: [facility];  Service: General       Family History   Problem Relation Age of Onset    No Known Problems Mother     Hypertension Father     Diabetes Father     Diabetes Maternal Grandfather      I have reviewed and agree with the history as documented  E-Cigarette/Vaping    E-Cigarette Use Never User      E-Cigarette/Vaping Substances     Social History     Tobacco Use    Smoking status: Never Smoker    Smokeless tobacco: Never Used   Vaping Use    Vaping Use: Never used   Substance Use Topics    Alcohol use: Never     Alcohol/week: 0 0 standard drinks     Comment: 0    Drug use: Never       Review of Systems   Constitutional: Positive for activity change  Negative for appetite change, chills and fever  HENT: Negative for congestion, ear pain, rhinorrhea, sneezing and sore throat  Eyes: Negative for discharge  Respiratory: Negative for cough and shortness of breath  Cardiovascular: Negative for chest pain and leg swelling  Gastrointestinal: Negative for abdominal pain, blood in stool, diarrhea, nausea and vomiting  Endocrine: Negative for polyuria  Genitourinary: Negative for dysuria, frequency and urgency  Musculoskeletal: Negative for back pain and myalgias  Skin: Negative for rash  Neurological: Positive for seizures  Negative for dizziness, speech difficulty, weakness, light-headedness and numbness  Hematological: Negative for adenopathy  Psychiatric/Behavioral: Negative for confusion  All other systems reviewed and are negative  Physical Exam  Physical Exam  Vitals reviewed  Constitutional:       General: She is not in acute distress  Appearance: She is not ill-appearing, toxic-appearing or diaphoretic        Comments: Patient prefers to keep her eyes closed but will open them to command she has jerking of her head her left upper extremity and tremors throughout her body  HENT:      Head: Normocephalic  Right Ear: Tympanic membrane normal       Left Ear: Tympanic membrane normal       Nose: Nose normal  No congestion or rhinorrhea  Mouth/Throat:      Mouth: Mucous membranes are moist       Pharynx: No oropharyngeal exudate or posterior oropharyngeal erythema  Eyes:      General:         Right eye: No discharge  Left eye: No discharge  Extraocular Movements: Extraocular movements intact  Conjunctiva/sclera: Conjunctivae normal       Pupils: Pupils are equal, round, and reactive to light  Comments: Extraocular movements intact looks away from light     Cardiovascular:      Rate and Rhythm: Normal rate and regular rhythm  Pulses: Normal pulses  Pulmonary:      Effort: Pulmonary effort is normal  No respiratory distress  Breath sounds: No stridor  No wheezing, rhonchi or rales  Abdominal:      General: Bowel sounds are normal  There is no distension  Tenderness: There is no abdominal tenderness  There is no right CVA tenderness, left CVA tenderness or guarding  Musculoskeletal:         General: Normal range of motion  Cervical back: Normal range of motion and neck supple  Right lower leg: No edema  Left lower leg: No edema  Skin:     General: Skin is warm and dry  Capillary Refill: Capillary refill takes less than 2 seconds  Neurological:      Mental Status: She is oriented to person, place, and time  Cranial Nerves: No cranial nerve deficit  Sensory: No sensory deficit  Motor: No weakness        Coordination: Coordination normal       Deep Tendon Reflexes: Reflexes normal    Psychiatric:      Comments: flat         Vital Signs  ED Triage Vitals   Temperature Pulse Respirations Blood Pressure SpO2   04/30/22 1656 04/30/22 1656 04/30/22 1656 04/30/22 1656 04/30/22 1656   97 8 °F (36 6 °C) 81 18 123/60 99 %      Temp Source Heart Rate Source Patient Position - Orthostatic VS BP Location FiO2 (%)   04/30/22 1656 04/30/22 1653 04/30/22 1656 04/30/22 1656 --   Temporal Monitor Sitting Left arm       Pain Score       04/30/22 1653       No Pain           Vitals:    04/30/22 2031 04/30/22 2130 04/30/22 2145 05/01/22 0000   BP: 120/59 116/57 120/77 114/59   Pulse: 87 80 90 78   Patient Position - Orthostatic VS: Lying   Lying         Visual Acuity      ED Medications  Medications   diphenhydrAMINE (BENADRYL) injection 50 mg (50 mg Intravenous Given 4/30/22 1715)   dextrose 50 % IV solution 25 mL (25 mL Intravenous Given 4/30/22 1758)   magnesium sulfate IVPB (premix) SOLN 1 g (0 g Intravenous Stopped 4/30/22 2015)   potassium chloride (K-DUR,KLOR-CON) CR tablet 40 mEq (40 mEq Oral Given 4/30/22 2030)   potassium chloride 20 mEq IVPB (premix) (0 mEq Intravenous Stopped 4/30/22 2242)   calcium gluconate 3 g in sodium chloride 0 9 % 100 mL IVPB (0 g Intravenous Stopped 4/30/22 2259)   calcium gluconate 10 % injection **ADS Override Pull** (  Override Pull 4/30/22 2154)       Diagnostic Studies  Results Reviewed     Procedure Component Value Units Date/Time    Rapid drug screen, urine [[id number]]  (Abnormal) Collected: 04/30/22 1757    Lab Status: Final result Specimen: Urine Updated: 04/30/22 2020     Amph/Meth UR Negative     Barbiturate Ur Negative     Benzodiazepine Urine Negative     Cocaine Urine Negative     Methadone Urine Negative     Opiate Urine Negative     PCP Ur Negative     THC Urine Positive     Oxycodone Urine Negative    Narrative:      Presumptive report  If requested, specimen will be sent to reference lab for confirmation  FOR MEDICAL PURPOSES ONLY  IF CONFIRMATION NEEDED PLEASE CONTACT THE LAB WITHIN 5 DAYS      Drug Screen Cutoff Levels:  AMPHETAMINE/METHAMPHETAMINES  1000 ng/mL  BARBITURATES     200 ng/mL  BENZODIAZEPINES     200 ng/mL  COCAINE      300 ng/mL  METHADONE      300 ng/mL  OPIATES      300 ng/mL  PHENCYCLIDINE     25 ng/mL  THC       50 ng/mL  OXYCODONE      100 ng/mL    COVID/FLU/RSV - 2 hour TAT [[id number]]  (Normal) Collected: 04/30/22 1721    Lab Status: Final result Specimen: Nares from Nose Updated: 04/30/22 1806     SARS-CoV-2 Negative     INFLUENZA A PCR Negative     INFLUENZA B PCR Negative     RSV PCR Negative    Narrative:      FOR PEDIATRIC PATIENTS - copy/paste COVID Guidelines URL to browser: [email or web address]    SARS-CoV-2 assay is a Nucleic Acid Amplification assay intended for the  qualitative detection of nucleic acid from SARS-CoV-2 in nasopharyngeal  swabs  Results are for the presumptive identification of SARS-CoV-2 RNA  Positive results are indicative of infection with SARS-CoV-2, the virus  causing COVID-19, but do not rule out bacterial infection or co-infection  with other viruses  Laboratories within the [place] and its  territories are required to report all positive results to the appropriate  public health authorities  Negative results do not preclude SARS-CoV-2  infection and should not be used as the sole basis for treatment or other  patient management decisions  Negative results must be combined with  clinical observations, patient history, and epidemiological information  This test has not been FDA cleared or approved  This test has been authorized by FDA under an Emergency Use Authorization  (EUA)  This test is only authorized for the duration of time the  declaration that circumstances exist justifying the authorization of the  emergency use of an in vitro diagnostic tests for detection of SARS-CoV-2  virus and/or diagnosis of COVID-19 infection under section 564(b)(1) of  the Act, 21 U  S C  026SHD-1(N)(1), unless the authorization is terminated  or revoked sooner  The test has been validated but independent review by FDA  and CLIA is pending  Test performed using [employer] GeneXpert:  This RT-PCR assay targets N2,  a region unique to SARS-CoV-2  A conserved region in the E-gene was chosen  for pan-Sarbecovirus detection which includes SARS-CoV-2      UA w Reflex to Microscopic w Reflex to Culture [[id number]] Collected: 04/30/22 1757    Lab Status: Final result Specimen: Urine, Clean Catch Updated: 04/30/22 1801     Color, UA Yellow     Clarity, UA Clear     Specific Gravity, UA >=1 030     pH, UA 6 0     Leukocytes, UA Negative     Nitrite, UA Negative     Protein, UA Negative mg/dl      Glucose, UA Negative mg/dl      Ketones, UA Negative mg/dl      Urobilinogen, UA 0 2 E U /dl      Bilirubin, UA Negative     Blood, UA Negative    Basic metabolic panel [[id number]]  (Abnormal) Collected: 04/30/22 1721    Lab Status: Final result Specimen: Blood from Central Venous Line Updated: 04/30/22 1754     Sodium 136 mmol/L      Potassium 2 9 mmol/L      Chloride 106 mmol/L      CO2 21 mmol/L      ANION GAP 9 mmol/L      BUN 10 mg/dL      Creatinine 0 92 mg/dL      Glucose 84 mg/dL      Calcium 7 5 mg/dL      eGFR 79 ml/min/1 73sq m     Narrative:      [place] guidelines for Chronic Kidney Disease (CKD):     Stage 1 with normal or high GFR (GFR > 90 mL/min/1 73 square meters)    Stage 2 Mild CKD (GFR = 60-89 mL/min/1 73 square meters)    Stage 3A Moderate CKD (GFR = 45-59 mL/min/1 73 square meters)    Stage 3B Moderate CKD (GFR = 30-44 mL/min/1 73 square meters)    Stage 4 Severe CKD (GFR = 15-29 mL/min/1 73 square meters)    Stage 5 End Stage CKD (GFR <15 mL/min/1 73 square meters)  Note: GFR calculation is accurate only with a steady state creatinine    Hepatic function panel [[id number]]  (Abnormal) Collected: 04/30/22 1721    Lab Status: Final result Specimen: Blood from Central Venous Line Updated: 04/30/22 1754     Total Bilirubin 0 19 mg/dL      Bilirubin, Direct 0 03 mg/dL      Alkaline Phosphatase 59 U/L      AST 32 U/L      ALT 33 U/L      Total Protein 5 9 g/dL      Albumin 2 6 g/dL     Magnesium [[id number]]  (Normal) Collected: 04/30/22 1721    Lab Status: Final result Specimen: Blood from Central Venous Line Updated: 04/30/22 1754     Magnesium 2 0 mg/dL     TSH, 3rd generation with Free T4 reflex [[id number]]  (Normal) Collected: 04/30/22 1721    Lab Status: Final result Specimen: Blood from Central Venous Line Updated: 04/30/22 1754     TSH 3RD GENERATON 1 520 uIU/mL     Narrative:      Patients undergoing fluorescein dye angiography may retain small amounts of fluorescein in the body for 48-72 hours post procedure  Samples containing fluorescein can produce falsely depressed TSH values  If the patient had this procedure,a specimen should be resubmitted post fluorescein clearance        CK Total with Reflex CKMB [[id number]]  (Normal) Collected: 04/30/22 1721    Lab Status: Final result Specimen: Blood from Central Venous Line Updated: 04/30/22 1754     Total CK 39 U/L     High Sensitivity Troponin I Random [[id number]]  (Abnormal) Collected: 04/30/22 1721    Lab Status: Final result Specimen: Blood from Central Venous Line Updated: 04/30/22 1752     HS TnI random <2 ng/L     Fingerstick Glucose (POCT) [[id number]]  (Abnormal) Collected: 04/30/22 1728    Lab Status: Final result Updated: 04/30/22 1730     POC Glucose 63 mg/dl     CBC and differential [[id number]]  (Abnormal) Collected: 04/30/22 1721    Lab Status: Final result Specimen: Blood from Central Venous Line Updated: 04/30/22 1729     WBC 7 92 Thousand/uL      RBC 3 86 Million/uL      Hemoglobin 11 2 g/dL      Hematocrit 36 3 %      MCV 94 fL      MCH 29 0 pg      MCHC 30 9 g/dL      RDW 14 6 %      MPV 10 5 fL      Platelets 655 Thousands/uL      nRBC 0 /100 WBCs      Neutrophils Relative 65 %      Immat GRANS % 0 %      Lymphocytes Relative 25 %      Monocytes Relative 7 %      Eosinophils Relative 2 %      Basophils Relative 1 %      Neutrophils Absolute 5 14 Thousands/µL      Immature Grans Absolute 0 02 Thousand/uL Lymphocytes Absolute 1 98 Thousands/µL      Monocytes Absolute 0 56 Thousand/µL      Eosinophils Absolute 0 17 Thousand/µL      Basophils Absolute 0 05 Thousands/µL                  CT head without contrast   Final Result by Flako Adam MD (04/30 1833)      No acute intracranial abnormality  Workstation performed: [id number]                    Procedures  ECG 12 Lead Documentation Only    Date/Time: 4/30/2022 7:06 PM  Performed by: Amarilis Cooper MD  Authorized by: Amarilis Cooper MD     Indications / Diagnosis:  Seizure like activity  ECG reviewed by me, the ED Provider: yes    Patient location:  ED  Previous ECG:     Previous ECG:  Compared to current    Comparison ECG info:  4/30/22 0859    Similarity:  Changes noted  Rate:     ECG rate:  82    ECG rate assessment: normal    Rhythm:     Rhythm: sinus rhythm    Comments:      No acute ischemic changes; nonspecfic ST-T changes resolved;              ED Course  ED Course as of 05/01/22 0303   Sat Apr 30, 2022   1751 Will treat glucose of 63 with 25ml of D50 and start D5 half-normal saline with 40 of KCL will recheck    1922 Patient is tearful that no one is doing anything for her  Magnesium and potassium infusing  Patient earlier in visit was demanding additional lorazepam I reviewed that the lorazepam is still in her system and did hold therefore will not administer any other medications until I talk to Neurology  Maykel henderson to connect with Dr Starla Madden epileptologist on-call   0807 Case reviewed with Dr Zulema Bland epileptologist on-call he does not recommend any specific medications currently he recommends transfer to [facility] for continuous EEG monitoring will contact PACs   1957 Updated patient and  on intent to transfer for continuous EEG monitoring they are agreeable  Patient is no longer having any shaking episodes    Patient is complaining of being hungry will provide with meal    1957 Corrected calcium 8 2; will supplement calcium   2057 Case reviewed with Dr Naun Crockett accepts patient in transfer to [facility]                               SBIRT 22yo+      Most Recent Value   SBIRT (23 yo +)    In order to provide better care to our patients, we are screening all of our patients for alcohol and drug use  Would it be okay to ask you these screening questions? Yes Filed at: 04/30/2022 1729   Initial Alcohol Screen: US AUDIT-C     1  How often do you have a drink containing alcohol? 0 Filed at: 04/30/2022 1729   2  How many drinks containing alcohol do you have on a typical day you are drinking? 0 Filed at: 04/30/2022 1729   3a  Male UNDER 65: How often do you have five or more drinks on one occasion? 0 Filed at: 04/30/2022 1729   3b  FEMALE Any Age, or MALE 65+: How often do you have 4 or more drinks on one occassion? 0 Filed at: 04/30/2022 1729   Audit-C Score 0 Filed at: 04/30/2022 1729   SLIME: How many times in the past year have you    Used an illegal drug or used a prescription medication for non-medical reasons? Never Filed at: 04/30/2022 1729                    MDM  Number of Diagnoses or Management Options  Hypocalcemia  Hypokalemia  Seizure-like activity ([facility] [address] )  Diagnosis management comments: Mdm:  Discharge summary from  reviewed as well as emergency department visit from earlier today at [facility] or his [place]  Will recheck electrolytes check EKG random trope case this represents a dystonic reaction will administer diphenhydramine 50 mg IV check CT of the head and review with neurology    Patient is able to follow commands and carry on a conversation despite tremors throughout the body and left upper extremity movement      Disposition  Final diagnoses:   Seizure-like activity ([address] )   Hypokalemia - 2 9   Hypocalcemia - corrected 8 2     Time reflects when diagnosis was documented in both MDM as applicable and the Disposition within this note     Time User Action Codes Description Comment    4/30/2022  7:56 PM Sonido Harvey Add [R56 9] Seizure-like activity ([address] )     4/30/2022  7:57 PM Sonido Harvey Add [E87 6] Hypokalemia     4/30/2022  8:04 PM Sonido Harvey Add [E83 51] Hypocalcemia     4/30/2022  8:04 PM Sonido Harvey Modify [E83 51] Hypocalcemia corrected 8 2    4/30/2022  8:04 PM Sonido Harvey Modify [E87 6] Hypokalemia 2 9      ED Disposition     ED Disposition Condition Date/Time Comment    Transfer to Another Facility-In Network  FIG Apr 30, 2022  8:58 PM Malu Bazzi should be transferred out to Dr Ron Mann at [facility] for futher evaluation        MD Documentation      Most Recent Value   Patient Condition The patient has been stabilized such that within reasonable medical probability, no material deterioration of the patient condition or the condition of the unborn child(rick) is likely to result from the transfer   Reason for Transfer Level of Care needed not available at this facility   Benefits of Transfer Specialized equipment and/or services available at the receiving facility (Include comment)________________________   Risks of Transfer Potential for delay in receiving treatment   Accepting Physician Dr Geovanny Sparrow Name, [address]    (Name & Tel number) PACS   Sending MD Dr aSmeera Davenport   Provider Certification General risk, such as traffic hazards, adverse weather conditions, rough terrain or turbulence, possible failure of equipment (including vehicle or aircraft), or consequences of actions of persons outside the control of the transport personnel      RN Documentation      [address] Name, [address]  [facility] [place]   Bed Assignment 159    (Name & Tel number) PACS      Follow-up Information    None         Discharge Medication List as of 5/1/2022 12:56 AM      CONTINUE these medications which have NOT CHANGED    Details   AMILoride 5 mg tablet Take 1 tablet (5 mg total) by mouth daily, Starting Wed 2/20/2019, Normal      amitriptyline (ELAVIL) 10 mg tablet Take 20 mg by mouth daily at bedtime  , Historical Med      B Complex Vitamins (VITAMIN B COMPLEX PO) Take 1 capsule by mouth daily  , Historical Med      calcium carbonate (OYSTER SHELL,OSCAL) 500 mg Take 1 tablet by mouth 3 (three) times a day with meals, Starting Mon 4/25/2022, Until Wed 5/25/2022, Normal      cyanocobalamin 1,000 mcg/mL Inject 1 mL (1,000 mcg total) into a muscle every 30 (thirty) days, Starting Mon 5/4/2020, Normal      ergocalciferol (ERGOCALCIFEROL) 1 25 MG (56960 UT) capsule Take 1 capsule (50,000 Units total) by mouth once a week Mondays and Thursdays, Starting Fri 9/17/2021, No Print      escitalopram (LEXAPRO) 10 mg tablet Take 20 mg by mouth daily at bedtime , Historical Med      ferrous sulfate 325 (65 Fe) mg tablet Take 325 mg by mouth every other day Last took 3/9/20 , Historical Med      methocarbamol (ROBAXIN) 500 mg tablet Take 1 tablet (500 mg total) by mouth 2 (two) times a day, Starting Sat 11/20/2021, Normal      pancrelipase, Lip-Prot-Amyl, (Creon) 24,000 units Take by mouth see administration instructions 3 capsules TID W meal and 1 capsules with Snack , Historical Med      potassium chloride 0 4 mEq/mL Infuse 200 mL (80 mEq total) into a venous catheter daily 80 meq in 1 litre bag to be infused daily over 8 hours, Starting Tue 5/11/2021, Print      Syringe/Needle, Disp, (SYRINGE 3CC/25GX5/8") 25G X 5/8" 3 ML MISC Use once monthly for B12 injection  , Normal      Catheters MISC by Does not apply route 2 (two) times a week Port care per [place] protocol, Starting Thu 5/7/2020, No Print      lidocaine (Lidoderm) 5 % Apply 1 patch topically daily for 7 days Remove & Discard patch within 12 hours or as directed by MD, Starting Sat 11/20/2021, Until Sat 11/27/2021, Normal      ondansetron (ZOFRAN) 4 mg tablet Take 1 tablet (4 mg total) by mouth every 8 (eight) hours as needed for nausea or vomiting, Starting Sun 3/6/2022, Normal             No discharge procedures on file      PDMP Review       Value Time User    PDMP Reviewed  Yes 3/6/2022 10:38 AM Carmelo Bower Cletis Shone, MD          ED Provider  Electronically Signed by           Tate Dong MD  05/01/22 7969

## 2022-04-30 NOTE — DISCHARGE INSTRUCTIONS
Thank you for letting us take care of you  You have been evaluated for seizure-like activities  Please follow-up as instructed  Please return for worsening symptoms  At this time, you have no clinical evidence of symptoms or problems that will require hospitalization, however you should be evaluated soon by a primary care physician, and contact information has been provided  Follow up with your primary care physician  This is important as many medical conditions can be managed as an outpatient, in addition to routine health screening  Seeing your primary doctor often can help identify changes in the medical issue that brought you to the ED for care today  If you experience any new symptoms or acute worsening of current symptoms, please return to the ED

## 2022-05-01 ENCOUNTER — APPOINTMENT (INPATIENT)
Dept: NEUROLOGY | Facility: HOSPITAL | Age: 37
DRG: 756 | End: 2022-05-01
Attending: INTERNAL MEDICINE
Payer: COMMERCIAL

## 2022-05-01 ENCOUNTER — HOSPITAL ENCOUNTER (INPATIENT)
Facility: HOSPITAL | Age: 37
LOS: 1 days | Discharge: HOME | DRG: 756 | End: 2022-05-01
Attending: INTERNAL MEDICINE | Admitting: INTERNAL MEDICINE
Payer: COMMERCIAL

## 2022-05-01 ENCOUNTER — APPOINTMENT (INPATIENT)
Dept: NEUROLOGY | Facility: CLINIC | Age: 37
DRG: 756 | End: 2022-05-01
Payer: COMMERCIAL

## 2022-05-01 ENCOUNTER — APPOINTMENT (INPATIENT)
Dept: NEUROLOGY | Facility: HOSPITAL | Age: 37
DRG: 756 | End: 2022-05-01
Payer: COMMERCIAL

## 2022-05-01 VITALS
OXYGEN SATURATION: 98 % | WEIGHT: 130 LBS | HEART RATE: 85 BPM | HEIGHT: 63 IN | SYSTOLIC BLOOD PRESSURE: 124 MMHG | RESPIRATION RATE: 14 BRPM | TEMPERATURE: 98.3 F | DIASTOLIC BLOOD PRESSURE: 96 MMHG | BODY MASS INDEX: 23.04 KG/M2

## 2022-05-01 VITALS
BODY MASS INDEX: 23.94 KG/M2 | TEMPERATURE: 97.8 F | SYSTOLIC BLOOD PRESSURE: 114 MMHG | OXYGEN SATURATION: 98 % | WEIGHT: 135.14 LBS | RESPIRATION RATE: 20 BRPM | HEART RATE: 78 BPM | DIASTOLIC BLOOD PRESSURE: 59 MMHG

## 2022-05-01 DIAGNOSIS — G43.909 MIGRAINE: ICD-10-CM

## 2022-05-01 DIAGNOSIS — R25.1 EPISODE OF SHAKING: Primary | ICD-10-CM

## 2022-05-01 DIAGNOSIS — E87.6 HYPOKALEMIA: ICD-10-CM

## 2022-05-01 PROBLEM — F44.5 PSYCHOGENIC NONEPILEPTIC SEIZURE: Status: ACTIVE | Noted: 2022-04-30

## 2022-05-01 LAB
ALBUMIN SERPL BCP-MCNC: 2.8 G/DL (ref 3.5–5)
ALP SERPL-CCNC: 53 U/L (ref 46–116)
ALT SERPL W P-5'-P-CCNC: 31 U/L (ref 12–78)
ANION GAP SERPL CALCULATED.3IONS-SCNC: 3 MMOL/L (ref 4–13)
AST SERPL W P-5'-P-CCNC: 28 U/L (ref 5–45)
ATRIAL RATE: 82 BPM
BACTERIA UR QL AUTO: ABNORMAL /HPF
BASOPHILS # BLD AUTO: 0.06 THOUSANDS/ΜL (ref 0–0.1)
BASOPHILS NFR BLD AUTO: 1 % (ref 0–1)
BILIRUB SERPL-MCNC: 0.35 MG/DL (ref 0.2–1)
BILIRUB UR QL STRIP: NEGATIVE
BUN SERPL-MCNC: 8 MG/DL (ref 5–25)
CALCIUM ALBUM COR SERPL-MCNC: 9.2 MG/DL (ref 8.3–10.1)
CALCIUM SERPL-MCNC: 8.2 MG/DL (ref 8.3–10.1)
CHLORIDE SERPL-SCNC: 112 MMOL/L (ref 100–108)
CLARITY UR: CLEAR
CO2 SERPL-SCNC: 24 MMOL/L (ref 21–32)
COLOR UR: ABNORMAL
CREAT SERPL-MCNC: 0.94 MG/DL (ref 0.6–1.3)
EOSINOPHIL # BLD AUTO: 0.09 THOUSAND/ΜL (ref 0–0.61)
EOSINOPHIL NFR BLD AUTO: 2 % (ref 0–6)
ERYTHROCYTE [DISTWIDTH] IN BLOOD BY AUTOMATED COUNT: 14.6 % (ref 11.6–15.1)
GFR SERPL CREATININE-BSD FRML MDRD: 77 ML/MIN/1.73SQ M
GLUCOSE SERPL-MCNC: 82 MG/DL (ref 65–140)
GLUCOSE UR STRIP-MCNC: NEGATIVE MG/DL
HCT VFR BLD AUTO: 39 % (ref 34.8–46.1)
HGB BLD-MCNC: 12.2 G/DL (ref 11.5–15.4)
HGB UR QL STRIP.AUTO: NEGATIVE
HYALINE CASTS #/AREA URNS LPF: ABNORMAL /LPF
IMM GRANULOCYTES # BLD AUTO: 0.01 THOUSAND/UL (ref 0–0.2)
IMM GRANULOCYTES NFR BLD AUTO: 0 % (ref 0–2)
KETONES UR STRIP-MCNC: NEGATIVE MG/DL
LEUKOCYTE ESTERASE UR QL STRIP: NEGATIVE
LYMPHOCYTES # BLD AUTO: 1.91 THOUSANDS/ΜL (ref 0.6–4.47)
LYMPHOCYTES NFR BLD AUTO: 32 % (ref 14–44)
MAGNESIUM SERPL-MCNC: 2 MG/DL (ref 1.6–2.6)
MCH RBC QN AUTO: 29.2 PG (ref 26.8–34.3)
MCHC RBC AUTO-ENTMCNC: 31.3 G/DL (ref 31.4–37.4)
MCV RBC AUTO: 93 FL (ref 82–98)
MONOCYTES # BLD AUTO: 0.43 THOUSAND/ΜL (ref 0.17–1.22)
MONOCYTES NFR BLD AUTO: 7 % (ref 4–12)
MUCOUS THREADS UR QL AUTO: ABNORMAL
NEUTROPHILS # BLD AUTO: 3.48 THOUSANDS/ΜL (ref 1.85–7.62)
NEUTS SEG NFR BLD AUTO: 58 % (ref 43–75)
NITRITE UR QL STRIP: NEGATIVE
NON-SQ EPI CELLS URNS QL MICRO: ABNORMAL /HPF
NRBC BLD AUTO-RTO: 0 /100 WBCS
P AXIS: 62 DEGREES
PH UR STRIP.AUTO: 6 [PH]
PHOSPHATE SERPL-MCNC: 3.1 MG/DL (ref 2.7–4.5)
PLATELET # BLD AUTO: 269 THOUSANDS/UL (ref 149–390)
PMV BLD AUTO: 10.1 FL (ref 8.9–12.7)
POTASSIUM SERPL-SCNC: 3.4 MMOL/L (ref 3.5–5.3)
PR INTERVAL: 118 MS
PROT SERPL-MCNC: 6.1 G/DL (ref 6.4–8.2)
PROT UR STRIP-MCNC: ABNORMAL MG/DL
QRS AXIS: 66 DEGREES
QRSD INTERVAL: 94 MS
QT INTERVAL: 376 MS
QTC INTERVAL: 439 MS
RBC # BLD AUTO: 4.18 MILLION/UL (ref 3.81–5.12)
RBC #/AREA URNS AUTO: ABNORMAL /HPF
SODIUM SERPL-SCNC: 139 MMOL/L (ref 136–145)
SP GR UR STRIP.AUTO: 1.02 (ref 1–1.03)
T WAVE AXIS: 61 DEGREES
TSH SERPL DL<=0.05 MIU/L-ACNC: 1.45 UIU/ML (ref 0.45–4.5)
UROBILINOGEN UR STRIP-ACNC: <2 MG/DL
VENTRICULAR RATE: 82 BPM
WBC # BLD AUTO: 5.98 THOUSAND/UL (ref 4.31–10.16)
WBC #/AREA URNS AUTO: ABNORMAL /HPF

## 2022-05-01 PROCEDURE — 99239 HOSP IP/OBS DSCHRG MGMT >30: CPT | Performed by: INTERNAL MEDICINE

## 2022-05-01 PROCEDURE — 99252 IP/OBS CONSLTJ NEW/EST SF 35: CPT | Performed by: PSYCHIATRY & NEUROLOGY

## 2022-05-01 PROCEDURE — 95813 EEG EXTND MNTR 61-119 MIN: CPT

## 2022-05-01 PROCEDURE — 84443 ASSAY THYROID STIM HORMONE: CPT | Performed by: INTERNAL MEDICINE

## 2022-05-01 PROCEDURE — 81001 URINALYSIS AUTO W/SCOPE: CPT | Performed by: INTERNAL MEDICINE

## 2022-05-01 PROCEDURE — 84100 ASSAY OF PHOSPHORUS: CPT | Performed by: INTERNAL MEDICINE

## 2022-05-01 PROCEDURE — G0379 DIRECT REFER HOSPITAL OBSERV: HCPCS

## 2022-05-01 PROCEDURE — 99223 1ST HOSP IP/OBS HIGH 75: CPT | Performed by: INTERNAL MEDICINE

## 2022-05-01 PROCEDURE — 85025 COMPLETE CBC W/AUTO DIFF WBC: CPT | Performed by: INTERNAL MEDICINE

## 2022-05-01 PROCEDURE — 80053 COMPREHEN METABOLIC PANEL: CPT | Performed by: INTERNAL MEDICINE

## 2022-05-01 PROCEDURE — 83735 ASSAY OF MAGNESIUM: CPT | Performed by: INTERNAL MEDICINE

## 2022-05-01 PROCEDURE — 93010 ELECTROCARDIOGRAM REPORT: CPT | Performed by: INTERNAL MEDICINE

## 2022-05-01 RX ORDER — ONDANSETRON 2 MG/ML
4 INJECTION INTRAMUSCULAR; INTRAVENOUS EVERY 6 HOURS PRN
Status: DISCONTINUED | OUTPATIENT
Start: 2022-05-01 | End: 2022-05-01 | Stop reason: HOSPADM

## 2022-05-01 RX ORDER — AMITRIPTYLINE HYDROCHLORIDE 10 MG/1
20 TABLET, FILM COATED ORAL
Qty: 60 TABLET | Refills: 0 | Status: SHIPPED | OUTPATIENT
Start: 2022-05-01

## 2022-05-01 RX ORDER — ACETAMINOPHEN 325 MG/1
650 TABLET ORAL EVERY 6 HOURS PRN
Status: DISCONTINUED | OUTPATIENT
Start: 2022-05-01 | End: 2022-05-01 | Stop reason: HOSPADM

## 2022-05-01 RX ORDER — POTASSIUM CHLORIDE 29.8 MG/ML
40 INJECTION INTRAVENOUS DAILY
Status: DISCONTINUED | OUTPATIENT
Start: 2022-05-01 | End: 2022-05-01

## 2022-05-01 RX ORDER — ESCITALOPRAM OXALATE 10 MG/1
10 TABLET ORAL DAILY
Status: DISCONTINUED | OUTPATIENT
Start: 2022-05-01 | End: 2022-05-01 | Stop reason: HOSPADM

## 2022-05-01 RX ORDER — ENOXAPARIN SODIUM 100 MG/ML
40 INJECTION SUBCUTANEOUS DAILY
Status: DISCONTINUED | OUTPATIENT
Start: 2022-05-01 | End: 2022-05-01 | Stop reason: HOSPADM

## 2022-05-01 RX ORDER — TEMAZEPAM 15 MG/1
15 CAPSULE ORAL
Status: DISCONTINUED | OUTPATIENT
Start: 2022-05-01 | End: 2022-05-01 | Stop reason: HOSPADM

## 2022-05-01 RX ORDER — ERGOCALCIFEROL 1.25 MG/1
50000 CAPSULE ORAL 2 TIMES WEEKLY
Status: DISCONTINUED | OUTPATIENT
Start: 2022-05-02 | End: 2022-05-01 | Stop reason: HOSPADM

## 2022-05-01 RX ORDER — MAGNESIUM HYDROXIDE/ALUMINUM HYDROXICE/SIMETHICONE 120; 1200; 1200 MG/30ML; MG/30ML; MG/30ML
30 SUSPENSION ORAL EVERY 6 HOURS PRN
Status: DISCONTINUED | OUTPATIENT
Start: 2022-05-01 | End: 2022-05-01 | Stop reason: HOSPADM

## 2022-05-01 RX ORDER — LORAZEPAM 2 MG/ML
2 INJECTION INTRAMUSCULAR EVERY 8 HOURS PRN
Status: DISCONTINUED | OUTPATIENT
Start: 2022-05-01 | End: 2022-05-01

## 2022-05-01 RX ORDER — AMITRIPTYLINE HYDROCHLORIDE 10 MG/1
10 TABLET, FILM COATED ORAL
Status: DISCONTINUED | OUTPATIENT
Start: 2022-05-01 | End: 2022-05-01 | Stop reason: HOSPADM

## 2022-05-01 RX ORDER — CALCIUM CARBONATE 500(1250)
1 TABLET ORAL
Status: DISCONTINUED | OUTPATIENT
Start: 2022-05-01 | End: 2022-05-01 | Stop reason: HOSPADM

## 2022-05-01 RX ORDER — POTASSIUM CHLORIDE 14.9 MG/ML
20 INJECTION INTRAVENOUS DAILY
Status: DISCONTINUED | OUTPATIENT
Start: 2022-05-01 | End: 2022-05-01 | Stop reason: HOSPADM

## 2022-05-01 RX ORDER — AMILORIDE HYDROCHLORIDE 5 MG/1
5 TABLET ORAL DAILY
Status: DISCONTINUED | OUTPATIENT
Start: 2022-05-01 | End: 2022-05-01 | Stop reason: HOSPADM

## 2022-05-01 RX ORDER — METHOCARBAMOL 500 MG/1
500 TABLET, FILM COATED ORAL 2 TIMES DAILY
Status: DISCONTINUED | OUTPATIENT
Start: 2022-05-01 | End: 2022-05-01 | Stop reason: HOSPADM

## 2022-05-01 RX ORDER — DOCUSATE SODIUM 100 MG/1
100 CAPSULE, LIQUID FILLED ORAL 2 TIMES DAILY PRN
Status: DISCONTINUED | OUTPATIENT
Start: 2022-05-01 | End: 2022-05-01 | Stop reason: HOSPADM

## 2022-05-01 RX ADMIN — AMILORIDE HYDROCLORIDE 5 MG: 5 TABLET ORAL at 08:51

## 2022-05-01 RX ADMIN — ESCITALOPRAM OXALATE 10 MG: 10 TABLET ORAL at 08:52

## 2022-05-01 RX ADMIN — PANCRELIPASE 24000 UNITS: 24000; 76000; 120000 CAPSULE, DELAYED RELEASE PELLETS ORAL at 12:46

## 2022-05-01 RX ADMIN — CALCIUM 1 TABLET: 500 TABLET ORAL at 08:52

## 2022-05-01 RX ADMIN — METHOCARBAMOL 500 MG: 500 TABLET, FILM COATED ORAL at 02:52

## 2022-05-01 RX ADMIN — DOCUSATE SODIUM 100 MG: 100 CAPSULE, LIQUID FILLED ORAL at 08:52

## 2022-05-01 RX ADMIN — AMITRIPTYLINE HYDROCHLORIDE 10 MG: 10 TABLET, FILM COATED ORAL at 02:51

## 2022-05-01 RX ADMIN — CALCIUM 1 TABLET: 500 TABLET ORAL at 12:47

## 2022-05-01 RX ADMIN — METHOCARBAMOL 500 MG: 500 TABLET, FILM COATED ORAL at 08:52

## 2022-05-01 RX ADMIN — PANCRELIPASE 24000 UNITS: 24000; 76000; 120000 CAPSULE, DELAYED RELEASE PELLETS ORAL at 08:51

## 2022-05-01 RX ADMIN — POTASSIUM CHLORIDE 20 MEQ: 14.9 INJECTION, SOLUTION INTRAVENOUS at 08:52

## 2022-05-01 RX ADMIN — ONDANSETRON 4 MG: 2 INJECTION INTRAMUSCULAR; INTRAVENOUS at 09:40

## 2022-05-01 NOTE — PLAN OF CARE
Problem: Potential for Falls  Goal: Patient will remain free of falls  Description: INTERVENTIONS:  - Educate patient/family on patient safety including physical limitations  - Instruct patient to call for assistance with activity   - Consult OT/PT to assist with strengthening/mobility   - Keep Call bell within reach  - Keep bed low and locked with side rails adjusted as appropriate  - Keep care items and personal belongings within reach  - Initiate and maintain comfort rounds  - Make Fall Risk Sign visible to staf  - Apply yellow socks and bracelet for high fall risk patients  - Consider moving patient to room near nurses station  Outcome: Progressing     Problem: PAIN - ADULT  Goal: Verbalizes/displays adequate comfort level or baseline comfort level  Description: Interventions:  - Encourage patient to monitor pain and request assistance  - Assess pain using appropriate pain scale  - Administer analgesics based on type and severity of pain and evaluate response  - Implement non-pharmacological measures as appropriate and evaluate response  - Consider cultural and social influences on pain and pain management  - Notify physician/advanced practitioner if interventions unsuccessful or patient reports new pain  Outcome: Progressing     Problem: INFECTION - ADULT  Goal: Absence or prevention of progression during hospitalization  Description: INTERVENTIONS:  - Assess and monitor for signs and symptoms of infection  - Monitor lab/diagnostic results  - Monitor all insertion sites, i e  indwelling lines, tubes, and drains  - Monitor endotracheal if appropriate and nasal secretions for changes in amount and color  - [place] appropriate cooling/warming therapies per order  - Administer medications as ordered  - Instruct and encourage patient and family to use good hand hygiene technique  - Identify and instruct in appropriate isolation precautions for identified infection/condition  Outcome: Progressing  Goal: Absence of fever/infection during neutropenic period  Description: INTERVENTIONS:  - Monitor WBC    Outcome: Progressing     Problem: SAFETY ADULT  Goal: Patient will remain free of falls  Description: INTERVENTIONS:  - Educate patient/family on patient safety including physical limitations  - Instruct patient to call for assistance with activity   - Consult OT/PT to assist with strengthening/mobility   - Keep Call bell within reach  - Keep bed low and locked with side rails adjusted as appropriate  - Keep care items and personal belongings within reach  - Initiate and maintain comfort rounds  - Make Fall Risk Sign visible to staff  - Apply yellow socks and bracelet for high fall risk patients  - Consider moving patient to room near nurses station  Outcome: Progressing  Goal: Maintain or return to baseline ADL function  Description: INTERVENTIONS:  -  Assess patient's ability to carry out ADLs; assess patient's baseline for ADL function and identify physical deficits which impact ability to perform ADLs (bathing, care of mouth/teeth, toileting, grooming, dressing, etc )  - Assess/evaluate cause of self-care deficits   - Assess range of motion  - Assess patient's mobility; develop plan if impaired  - Assess patient's need for assistive devices and provide as appropriate  - Encourage maximum independence but intervene and supervise when necessary  - Involve family in performance of ADLs  - Assess for home care needs following discharge   - Consider OT consult to assist with ADL evaluation and planning for discharge  - Provide patient education as appropriate  Outcome: Progressing  Goal: Maintains/Returns to pre admission functional level  Description: INTERVENTIONS:  - Perform BMAT or MOVE assessment daily    - Set and communicate daily mobility goal to care team and patient/family/caregiver     - Collaborate with rehabilitation services on mobility goals if consulted  - Out of bed for toileting  - Record patient progress and toleration of activity level   Outcome: Progressing     Problem: DISCHARGE PLANNING  Goal: Discharge to home or other facility with appropriate resources  Description: INTERVENTIONS:  - Identify barriers to discharge w/patient and caregiver  - Arrange for needed discharge resources and transportation as appropriate  - Identify discharge learning needs (meds, wound care, etc )  - Arrange for interpretive services to assist at discharge as needed  - Refer to Case Management Department for coordinating discharge planning if the patient needs post-hospital services based on physician/advanced practitioner order or complex needs related to functional status, cognitive ability, or social support system  Outcome: Progressing     Problem: Knowledge Deficit  Goal: Patient/family/caregiver demonstrates understanding of disease process, treatment plan, medications, and discharge instructions  Description: Complete learning assessment and assess knowledge base    Interventions:  - Provide teaching at level of understanding  - Provide teaching via preferred learning methods  Outcome: Progressing

## 2022-05-01 NOTE — ASSESSMENT & PLAN NOTE
· Patient has amiloride 5 mg daily  · Also gets daily infusions of potassium chloride intravenous 80 mEq daily per port  · This afternoon in [facility], her potassium level was 2 9; she received potassium chloride 60 mEq total intravenous with 20 mEq by mouth  · Will recheck potassium here in [facility] along with calcium  · As patient has hypokalemia will place telemetry  · Patient being managed by Nephrology for chronic hypokalemia and repletion  Will place consult

## 2022-05-01 NOTE — ASSESSMENT & PLAN NOTE
76-year-old female with chronic hypokalemia and hypocalcemia, status post gastric bypass, status post hysterectomy, hypertension, migraine headaches, anxiety/depression, suspicion of conversion disorder in the past, presents with shaking spells  Please see HPI for description of spells  Patient had 2 of her typical spells while on video EEG monitoring, which did not have EEG correlate  Rhythm remained alpha during the spells as discussed with Epileptology  As discussed with patient, spells are felt to be nonepileptic in origin and unrelated to her electrolyte abnormalities  Neurologic exam nonfocal   CT head on 04/25/2022 and 04/30/2022 with no acute changes  Plan:  -discussed with patient that it is recommended she find a therapist for cognitive behavioral therapy   -all questions answered and patient appeared relieved that she is not having epileptic seizures   -no further inpatient neurologic recommendations  Patient follows with Neurology at [facility] and should continue to do so for her migraines

## 2022-05-01 NOTE — ASSESSMENT & PLAN NOTE
· Patient has amiloride 5 mg daily  · Also gets daily infusions of potassium chloride intravenous 80 mEq daily per port  · This afternoon in [facility], her potassium level was 2 9; she received potassium chloride 60 mEq total intravenous with 20 mEq by mouth  · Will recheck potassium here in [facility] along with calcium  · As patient has hypokalemia will place telemetry        5/1/22:  Hypokalemia significantly improved, will continue supplementation, patient will continue follow-up with her outpatient nephrologist for management  -BMP ordered to recheck potassium levels within 48 hours of discharge

## 2022-05-01 NOTE — DISCHARGE SUMMARY
[address] [facility]  Discharge- Aiyana Altair Prep 1985, 44 y o  female MRN: [id number]  [facility] Encounter: [id number]  Primary Care Provider: Keith Colón MD   Date and time admitted to hospital: 5/1/2022  1:55 AM    History of anxiety  Assessment & Plan  Continue Elavil and Lexapro  Chronic pancreatitis [facility])  Assessment & Plan  Patient takes Pancrease 06411 units with meals  Migraine  Assessment & Plan  Will order refill for amitriptyline 20 mg daily    Vitamin B12 deficiency  Assessment & Plan  On monthly B12 shots  Vitamin D insufficiency  Assessment & Plan  Continue ergocalciferol 74615 units twice weekly on Monday and Thursday  Hypocalcemia  Assessment & Plan  Patient received 3 g of calcium gluconate in [place]; will recheck calcium  Hypokalemia  Assessment & Plan  · Patient has amiloride 5 mg daily  · Also gets daily infusions of potassium chloride intravenous 80 mEq daily per port  · This afternoon in [facility], her potassium level was 2 9; she received potassium chloride 60 mEq total intravenous with 20 mEq by mouth  · Will recheck potassium here in [facility] along with calcium  · As patient has hypokalemia will place telemetry  5/1/22:  Hypokalemia significantly improved, will continue supplementation, patient will continue follow-up with her outpatient nephrologist for management  -[facility] ordered to recheck potassium levels within 48 hours of discharge    * Psychogenic nonepileptic spells  Assessment & Plan  · Patient was seen in [facility] due to recurrent hypokalemia and hypocalcemia with generalized weakness and involving shaking of the whole body and upper extremities although she can follow commands as per their notes  Questionable seizures although pseudoseizures is a likely possibility    Patient is transferred to [facility] for further evaluation with video EEG has recommended by Neurology Adriana Brice)  · Patient states that her Gee Coker begins by having facial numbness which then leads to stiffness and then periodic shaking approximately 5 minutes  It is therefore possible that the initial numbness and stiffness may be secondary to hypokalemia with hypocalcemic tetany  · Will consult Neurology  · For video EEG  · As of the moment neurology does not recommend antiepileptic medications until result of video EEG are obtained  · Seizure precaution  · Neuro checks per protocol  · Patient claims also that she has insomnia due to these shaking episodes; will place Restoril is 15 mg at night  · Her shakiness also resolves with Ativan which is placed p r n         5/1/22:  Patient Valium by Neurology and placed EEG with notable repeated events however no epileptic activity on monitoring  -appreciate neurology recommendations  -patient will follow-up with outpatient psychiatry for psychogenic nonepileptic spells  -Elavil ordered        Medical Problems             Resolved Problems  Date Reviewed: 5/1/2022    None              Discharging Physician / Practitioner: Janey Landin DO  PCP: Barry Wellington MD  Admission Date:   Admission Orders (From admission, onward)     Ordered        05/01/22 0156  Inpatient Admission  Once            05/01/22 0156  Inpatient Admission  Once                      Discharge Date: 05/01/22    Consultations During Hospital Stay:  · neurology    Procedures Performed:   · veeg    Significant Findings / Test Results:   · Negative veeg    Incidental Findings:   · none     Test Results Pending at Discharge (will require follow up):   · none     Outpatient Tests Requested:  · none    Complications:  none    Reason for Admission:  Seizure-like activity    Hospital Course: Shayne Mejía is a 44 y o  female patient who originally presented to the hospital on 5/1/2022 due to seizure-like activity    She was evaluated by Neurology and monitor on video EEG which was negative for any epileptic activity even while she had her shaking attacks  Patient was recommend to follow up with Psychiatry and was discharged on 5/1/22  Please see above list of diagnoses and related plan for additional information  Condition at Discharge: good    Discharge Day Visit / Exam:   Subjective:  Patient denies any acute complaints on the day of discharge  Vitals: Blood Pressure: 124/96 (05/01/22 0929)  Pulse: 85 (05/01/22 0929)  Temperature: 98 3 °F (36 8 °C) (05/01/22 0211)  Temp Source: Oral (05/01/22 0211)  Respirations: 14 (05/01/22 8678)  Height: 5' 3" (160 cm) (05/01/22 0211)  Weight - Scale: 59 kg (130 lb) (05/01/22 0211)  SpO2: 98 % (05/01/22 0929)  Exam:   Physical Exam  Vitals and nursing note reviewed  Constitutional:       General: She is not in acute distress  Appearance: She is well-developed  She is not ill-appearing, toxic-appearing or diaphoretic  HENT:      Head: Normocephalic and atraumatic  Eyes:      General: No scleral icterus  Conjunctiva/sclera: Conjunctivae normal    Cardiovascular:      Rate and Rhythm: Normal rate and regular rhythm  Heart sounds: No murmur heard  No friction rub  No gallop  Pulmonary:      Effort: Pulmonary effort is normal  No respiratory distress  Breath sounds: Normal breath sounds  No stridor  No wheezing, rhonchi or rales  Chest:      Chest wall: No tenderness  Abdominal:      General: There is no distension  Palpations: Abdomen is soft  There is no mass  Tenderness: There is no abdominal tenderness  There is no guarding or rebound  Hernia: No hernia is present  Musculoskeletal:         General: No swelling, tenderness, deformity or signs of injury  Cervical back: Neck supple  Right lower leg: No edema  Left lower leg: No edema  Skin:     General: Skin is warm and dry  Neurological:      Mental Status: She is alert and oriented to person, place, and time  Discussion with Family: Patient declined call to   Discharge instructions/Information to patient and family:   See after visit summary for information provided to patient and family  Provisions for Follow-Up Care:  See after visit summary for information related to follow-up care and any pertinent home health orders  Disposition:   Home    Planned Readmission: no     Discharge Statement:  I spent 35 minutes discharging the patient  This time was spent on the day of discharge  I had direct contact with the patient on the day of discharge  Greater than 50% of the total time was spent examining patient, answering all patient questions, arranging and discussing plan of care with patient as well as directly providing post-discharge instructions  Additional time then spent on discharge activities  Discharge Medications:  See after visit summary for reconciled discharge medications provided to patient and/or family        **Please Note: This note may have been constructed using a voice recognition system**

## 2022-05-01 NOTE — ASSESSMENT & PLAN NOTE
Patient follows with Neurology at [facility] for her migraines and is currently on Elavil  Continue same

## 2022-05-01 NOTE — PLAN OF CARE
Problem: Potential for Falls  Goal: Patient will remain free of falls  Description: INTERVENTIONS:  - Educate patient/family on patient safety including physical limitations  - Instruct patient to call for assistance with activity   - Consult OT/PT to assist with strengthening/mobility   - Keep Call bell within reach  - Keep bed low and locked with side rails adjusted as appropriate  - Keep care items and personal belongings within reach  - Initiate and maintain comfort rounds  - Make Fall Risk Sign visible to staff  - Apply yellow socks and bracelet for high fall risk patients  - Consider moving patient to room near nurses station  Outcome: Progressing     Problem: PAIN - ADULT  Goal: Verbalizes/displays adequate comfort level or baseline comfort level  Description: Interventions:  - Encourage patient to monitor pain and request assistance  - Assess pain using appropriate pain scale  - Administer analgesics based on type and severity of pain and evaluate response  - Implement non-pharmacological measures as appropriate and evaluate response  - Consider cultural and social influences on pain and pain management  - Notify physician/advanced practitioner if interventions unsuccessful or patient reports new pain  Outcome: Progressing     Problem: INFECTION - ADULT  Goal: Absence or prevention of progression during hospitalization  Description: INTERVENTIONS:  - Assess and monitor for signs and symptoms of infection  - Monitor lab/diagnostic results  - Monitor all insertion sites, i e  indwelling lines, tubes, and drains  - Monitor endotracheal if appropriate and nasal secretions for changes in amount and color  - [place] appropriate cooling/warming therapies per order  - Administer medications as ordered  - Instruct and encourage patient and family to use good hand hygiene technique  - Identify and instruct in appropriate isolation precautions for identified infection/condition  Outcome: Progressing     Problem: SAFETY ADULT  Goal: Patient will remain free of falls  Description: INTERVENTIONS:  - Educate patient/family on patient safety including physical limitations  - Instruct patient to call for assistance with activity   - Consult OT/PT to assist with strengthening/mobility   - Keep Call bell within reach  - Keep bed low and locked with side rails adjusted as appropriate  - Keep care items and personal belongings within reach  - Initiate and maintain comfort rounds  - Make Fall Risk Sign visible to staff  - Apply yellow socks and bracelet for high fall risk patients  - Consider moving patient to room near nurses station  Outcome: Progressing  Goal: Maintain or return to baseline ADL function  Description: INTERVENTIONS:  -  Assess patient's ability to carry out ADLs; assess patient's baseline for ADL function and identify physical deficits which impact ability to perform ADLs (bathing, care of mouth/teeth, toileting, grooming, dressing, etc )  - Assess/evaluate cause of self-care deficits   - Assess range of motion  - Assess patient's mobility; develop plan if impaired  - Assess patient's need for assistive devices and provide as appropriate  - Encourage maximum independence but intervene and supervise when necessary  - Involve family in performance of ADLs  - Assess for home care needs following discharge   - Consider OT consult to assist with ADL evaluation and planning for discharge  - Provide patient education as appropriate  Outcome: Progressing  Goal: Maintains/Returns to pre admission functional level  Description: INTERVENTIONS:  - Perform BMAT or MOVE assessment daily    - Set and communicate daily mobility goal to care team and patient/family/caregiver     - Collaborate with rehabilitation services on mobility goals if consulted  - Out of bed for toileting  - Record patient progress and toleration of activity level   Outcome: Progressing     Problem: DISCHARGE PLANNING  Goal: Discharge to home or other facility with appropriate resources  Description: INTERVENTIONS:  - Identify barriers to discharge w/patient and caregiver  - Arrange for needed discharge resources and transportation as appropriate  - Identify discharge learning needs (meds, wound care, etc )  - Arrange for interpretive services to assist at discharge as needed  - Refer to Case Management Department for coordinating discharge planning if the patient needs post-hospital services based on physician/advanced practitioner order or complex needs related to functional status, cognitive ability, or social support system  Outcome: Progressing     Problem: Knowledge Deficit  Goal: Patient/family/caregiver demonstrates understanding of disease process, treatment plan, medications, and discharge instructions  Description: Complete learning assessment and assess knowledge base    Interventions:  - Provide teaching at level of understanding  - Provide teaching via preferred learning methods  Outcome: Progressing

## 2022-05-01 NOTE — ASSESSMENT & PLAN NOTE
· Patient was seen in [facility] due to recurrent hypokalemia and hypocalcemia with generalized weakness and involving shaking of the whole body and upper extremities although she can follow commands as per their notes  Questionable seizures although pseudoseizures is a likely possibility  Patient is transferred to [facility] for further evaluation with video EEG has recommended by Neurology Adelfo Reilly)  · Patient states that her Derrel Halon begins by having facial numbness which then leads to stiffness and then periodic shaking approximately 5 minutes  It is therefore possible that the initial numbness and stiffness may be secondary to hypokalemia with hypocalcemic tetany  · Will consult Neurology  · For video EEG  · As of the moment neurology does not recommend antiepileptic medications until result of video EEG are obtained  · Seizure precaution  · Neuro checks per protocol  · Patient claims also that she has insomnia due to these shaking episodes; will place Restoril is 15 mg at night  · Her shakiness also resolves with Ativan which is placed p r n         5/1/22:  Patient Valium by Neurology and placed EEG with notable repeated events however no epileptic activity on monitoring  -appreciate neurology recommendations  -patient will follow-up with outpatient psychiatry for psychogenic nonepileptic spells  -Elavil ordered

## 2022-05-01 NOTE — EMTALA/ACUTE CARE TRANSFER
[facility] EMERGENCY DEPARTMENT  [address] [place] [id number]  Dept: [address] [place] TRANSFER CONSENT    NAME Quynh Mccormack                                         1985                              MRN [id number]    I have been informed of my rights regarding examination, treatment, and transfer   by Dr Westley Olszewski, MD    Benefits: Specialized equipment and/or services available at the receiving facility (Include comment)________________________    Risks: Potential for delay in receiving treatment      Consent for Transfer:  I acknowledge that my medical condition has been evaluated and explained to me by the emergency department physician or other qualified medical person and/or my attending physician, who has recommended that I be transferred to the service of  Accepting Physician: Dr Richard Martínez at [address], [address] : [place], [place]  The above potential benefits of such transfer, the potential risks associated with such transfer, and the probable risks of not being transferred have been explained to me, and I fully understand them  The doctor has explained that, in my case, the benefits of transfer outweigh the risks  I agree to be transferred  I authorize the performance of emergency medical procedures and treatments upon me in both transit and upon arrival at the receiving facility  Additionally, I authorize the release of any and all medical records to the receiving facility and request they be transported with me, if possible  I understand that the safest mode of transportation during a medical emergency is an ambulance and that the Hospital advocates the use of this mode of transport  Risks of traveling to the receiving facility by car, including absence of medical control, life sustaining equipment, such as oxygen, and medical personnel has been explained to me and I fully understand them      (CRESCENCIO CORRECT BOX BELOW)  [ x ]  I consent to the stated transfer and to be transported by ambulance/helicopter  [  ]  I consent to the stated transfer, but refuse transportation by ambulance and accept full responsibility for my transportation by car  I understand the risks of non-ambulance transfers and I exonerate the Hospital and its staff from any deterioration in my condition that results from this refusal     X___________________________________________    DATE  22  TIME________  Signature of patient or legally responsible individual signing on patient behalf           RELATIONSHIP TO PATIENT_________________________          Provider Certification    NAME Estelita Given                                         1985                              MRN [id number]    A medical screening exam was performed on the above named patient  Based on the examination:    Condition Necessitating Transfer The primary encounter diagnosis was Seizure-like activity ([facility] [address] )  Diagnoses of Hypokalemia and Hypocalcemia were also pertinent to this visit  Patient Condition: The patient has been stabilized such that within reasonable medical probability, no material deterioration of the patient condition or the condition of the unborn child(rick) is likely to result from the transfer    Reason for Transfer: Level of Care needed not available at this facility    Transfer Requirements: [address] [place] [address]   · Space available and qualified personnel available for treatment as acknowledged by PACS  · Agreed to accept transfer and to provide appropriate medical treatment as acknowledged by       Dr Kay Patrick  · Appropriate medical records of the examination and treatment of the patient are provided at the time of transfer   [address] [facility], [address] _______  · Transfer will be performed by qualified personnel from    and appropriate transfer equipment as required, including the use of necessary and appropriate life support measures      Provider Certification: I have examined the patient and explained the following risks and benefits of being transferred/refusing transfer to the patient/family:  General risk, such as traffic hazards, adverse weather conditions, rough terrain or turbulence, possible failure of equipment (including vehicle or aircraft), or consequences of actions of persons outside the control of the transport personnel      Based on these reasonable risks and benefits to the patient and/or the unborn child(rick), and based upon the information available at the time of the patients examination, I certify that the medical benefits reasonably to be expected from the provision of appropriate medical treatments at another medical facility outweigh the increasing risks, if any, to the individuals medical condition, and in the case of labor to the unborn child, from effecting the transfer      X____________________________________________ DATE 04/30/22        TIME_______      ORIGINAL - SEND TO MEDICAL RECORDS   COPY - SEND WITH PATIENT DURING TRANSFER

## 2022-05-01 NOTE — H&P
[facility]  H&P- Brian Rojas 1985, 44 y o  female MRN: [id number]  Unit/Bed#: [facility] 716-01 Encounter: [id number]  Primary Care Provider: Anne Pleitez MD   Date and time admitted to hospital: 5/1/2022  1:55 AM    * Episode of shaking  Assessment & Plan  · Patient was seen in [facility] due to recurrent hypokalemia and hypocalcemia with generalized weakness and involving shaking of the whole body and upper extremities although she can follow commands as per their notes  Questionable seizures although pseudoseizures is a likely possibility  Patient is transferred to [facility] for further evaluation with video EEG has recommended by Neurology [place] Ure[place])  · Patient states that her Sheila Meka begins by having facial numbness which then leads to stiffness and then periodic shaking approximately 5 minutes  It is therefore possible that the initial numbness and stiffness may be secondary to hypokalemia with hypocalcemic tetany  · Will consult Neurology  · For video EEG  · As of the moment neurology does not recommend antiepileptic medications until result of video EEG are obtained  · Seizure precaution  · Neuro checks per protocol  · Patient claims also that she has insomnia due to these shaking episodes; will place Restoril is 15 mg at night  · Her shakiness also resolves with Ativan which is placed p r n  Hypokalemia  Assessment & Plan  · Patient has amiloride 5 mg daily  · Also gets daily infusions of potassium chloride intravenous 80 mEq daily per port  · This afternoon in [address], her potassium level was 2 9; she received potassium chloride 60 mEq total intravenous with 20 mEq by mouth  · Will recheck potassium here in [facility] along with calcium  · As patient has hypokalemia will place telemetry  · Patient being managed by Nephrology for chronic hypokalemia and repletion  Will place consult      History of anxiety  Assessment & Plan  Continue Elavil and Lexapro  Chronic pancreatitis [facility])  Assessment & Plan  Patient takes Pancrease 35216 units with meals  Vitamin B12 deficiency  Assessment & Plan  On monthly B12 shots  Vitamin D insufficiency  Assessment & Plan  Continue ergocalciferol 22200 units twice weekly on Monday and Thursday  Hypocalcemia  Assessment & Plan  Patient received 3 g of calcium gluconate in [place]; will recheck calcium  VTE Prophylaxis: Enoxaparin (Lovenox)  / sequential compression device   Code Status: Level 1 - Full Code as discussed with patient  POLST: There is no POLST form on file for this patient (pre-hospital)    Anticipated Length of Stay:  Patient will be admitted on an Inpatient basis with an anticipated length of stay of  greater than 2 midnights  Justification for Hospital Stay: Please see detailed plans noted above  Chief Complaint:     Shaking episodes with possibility of seizures versus pseudo-seizure  History of Present Illness:  Maricruz Townsend is a 44 y o  female who has past medical history significant for gastric bypass surgery with subsequent electrolyte malabsorption and increased losses especially with calcium, potassium and phosphorus  Likewise, she has a history of anxiety B12 deficiency and chronic pancreatitis on pancreatic enzyme supplementation  Over the course of the past week, she has been seen repeatedly in [facility] complaining of generalized weakness  It is of note that she has a history of chronic hypokalemia, hypocalcemia and hypophosphatemia secondary to malabsorption and increased losses as a result of bariatric surgery as noted above  She therefore sees Nephrology for repletion management of these electrolytes and she takes a daily infusion of potassium chloride 80 mEq to her venous port  At the same time, for the past week she has been complaining of tremors with muscle spasms of the arms and legs  Noted she was found to have a potassium of 2 2 and received IV supplementation with improvement of her symptoms once it is repeated  For the past 24 hours,  has noted for episodes of shaking episodes in which she was awake and conscious however has repeated contractions and shaking of the left upper extremity with whole body shaking  After the third visit in [facility] for the past 7 days, she decided to go to [facility] instead  In [address], she was noted to demonstrate the shaking episodes again with no loss of consciousness  According to their note, patient prefers to keep eyes closed but will open them to command and she has jerking of her head, left upper extremity with tremors throughout the body  These episodes were then described and discussed with Neurology who then suggested that the patient be transferred to [facility] for further monitoring specifically with video EEG  Noted that these episodes do resolve with administration of Ativan  Also, her hypokalemia was treated with potassium supplementation with 60 intravenous and 20 mEq oral   Hypocalcemia was treated with calcium gluconate 3 g x 1 prior to transfer  Currently, patient is seen in the room already shaking with an approximately 3 to 5 hertz involving upper extremities and torso with head  She was not in rigid position  She was alert during this time  It spontaneously resolved on its own  Patient claims that the shaking episodes start by and a numbing sensation usually at the left side of her head that spreads bilateral upper extremities and torso and head as well  During this time, she is communicative and able to follow commands  Patient states that she has not been sleeping very well due to these episodes      Review of Systems:    Constitutional:  Denies fever or chills   Eyes:  Denies change in visual acuity   HENT:  Denies nasal congestion or sore throat   Respiratory:  Denies cough or shortness of breath   Cardiovascular:  Denies chest pain or edema   GI:  Denies abdominal pain, nausea, vomiting, bloody stools or diarrhea   :  Denies dysuria   Musculoskeletal:  Denies back pain or joint pain   Integument:  Denies rash   Neurologic:  Denies headache, focal weakness or sensory changes   Endocrine:  Denies polyuria or polydipsia   Lymphatic:  Denies swollen glands   Psychiatric:  Denies depression or anxiety     Past Medical and Surgical History:   Past Medical History:   Diagnosis Date    C  difficile colitis     DUB (dysfunctional uterine bleeding)     H  pylori infection     Hypertension     Hypokalemia     Left lower quadrant abdominal pain 2020    Migraine     Psychiatric disorder     Anxiety    Rectal bleeding     Renal disorder     Rhabdomyolysis      Past Surgical History:   Procedure Laterality Date    ABDOMINAL SURGERY      APPENDECTOMY       SECTION      CHOLECYSTECTOMY      COSMETIC SURGERY      "tummy tuck"    FL GUIDED CENTRAL VENOUS ACCESS DEVICE INSERTION  2018    GASTRIC BYPASS      HYSTERECTOMY      LAPAROSCOPY      [place] SURG DIAGNOSTIC EXAM, ANORECTAL N/A 2021    Procedure: ANAL EXAM UNDER ANESTHESIA; HEMORRHOIDECTOMY;  Surgeon: Clarissa Ochoa DO;  Location:  MAIN OR;  Service: General    TUNNELED VENOUS PORT PLACEMENT N/A 2018    Procedure: INSERTION VENOUS PORT (PORT-A-CATH);   Surgeon: Regla Sears DO;  Location: [facility] MAIN OR;  Service: General       Meds/Allergies:  Medications Prior to Admission   Medication    AMILoride 5 mg tablet    amitriptyline (ELAVIL) 10 mg tablet    B Complex Vitamins (VITAMIN B COMPLEX PO)    calcium carbonate (OYSTER SHELL,OSCAL) 500 mg    cyanocobalamin 1,000 mcg/mL    ergocalciferol (ERGOCALCIFEROL) 1 25 MG (80847 UT) capsule    escitalopram (LEXAPRO) 10 mg tablet    ferrous sulfate 325 (65 Fe) mg tablet    methocarbamol (ROBAXIN) 500 mg tablet    pancrelipase, Lip-Prot-Amyl, (Creon) 24,000 units    potassium chloride 0 4 mEq/mL    Syringe/Needle, Disp, (SYRINGE 3CC/25GX5/8") 25G X 5/8" 3 ML MISC    Catheters MISC    lidocaine (Lidoderm) 5 %    ondansetron (ZOFRAN) 4 mg tablet       Allergies: Allergies   Allergen Reactions    Nsaids Other (See Comments)     Other reaction(s): Other (Please comment)  Should not take s/p bariatric surgery  Other reaction(s): Other (See Comments)  Should not take as per prior records  Should not take s/p bariatric surgery  Has hx of gastric bypass      Prednisone      Nausea, vomiting, diarrhea     History:  Marital Status: /Civil Union   Occupation:  Stay-at-home mom  Patient Pre-hospital Living Situation:  Lives at home with out new social concerns  Patient Pre-hospital Level of Mobility:  Ambulatory  Patient Pre-hospital Diet Restrictions:  Regular  Substance Use History:   Social History     Substance and Sexual Activity   Alcohol Use Never    Alcohol/week: 0 0 standard drinks    Comment: 0     Social History     Tobacco Use   Smoking Status Never Smoker   Smokeless Tobacco Never Used     Social History     Substance and Sexual Activity   Drug Use Never       Family History:  Family History   Problem Relation Age of Onset    No Known Problems Mother     Hypertension Father     Diabetes Father     Diabetes Maternal Grandfather        Physical Exam:     Vitals:   Blood Pressure: 125/68 (05/01/22 0211)  Pulse: 68 (05/01/22 0211)  Temperature: 98 3 °F (36 8 °C) (05/01/22 0211)  Temp Source: Oral (05/01/22 0211)  Respirations: 18 (05/01/22 0211)  Height: 5' 3" (160 cm) (05/01/22 0211)  Weight - Scale: 59 kg (130 lb) (05/01/22 0211)  SpO2: 99 % (05/01/22 0211)    Constitutional:  Well developed, well nourished, no acute distress, non-toxic appearance   Eyes:  PERRL, conjunctiva normal   HENT:  Atraumatic, external ears normal, nose normal, oropharynx moist, no pharyngeal exudates   Neck- normal range of motion, no tenderness, supple   Respiratory:  No respiratory distress, normal breath sounds, no rales, no wheezing   Cardiovascular:  Normal rate, normal rhythm, no murmurs, no gallops, no rubs   GI:  Soft, nondistended, normal bowel sounds, nontender, no organomegaly, no mass, no rebound, no guarding   :  No costovertebral angle tenderness   Musculoskeletal:  No edema, no tenderness, no deformities  Back- no tenderness  Integument:  Well hydrated, no rash   Lymphatic:  No lymphadenopathy noted   Neurologic:  Alert &awake, communicative, CN 2-12 normal, normal motor function, normal sensory function, no focal deficits noted ; during the time of shaking, she was not in tonic/clonic activity  Psychiatric:  Speech and behavior appropriate but flat affect      Lab Results: I have personally reviewed pertinent reports  Results from last 7 days   Lab Units 04/30/22  1721   WBC Thousand/uL 7 92   HEMOGLOBIN g/dL 11 2*   HEMATOCRIT % 36 3   PLATELETS Thousands/uL 270   NEUTROS PCT % 65   LYMPHS PCT % 25   MONOS PCT % 7   EOS PCT % 2     Results from last 7 days   Lab Units 04/30/22  1721   POTASSIUM mmol/L 2 9*   CHLORIDE mmol/L 106   CO2 mmol/L 21   BUN mg/dL 10   CREATININE mg/dL 0 92   CALCIUM mg/dL 7 5*   ALK PHOS U/L 59   ALT U/L 33   AST U/L 32         Imaging: I have personally reviewed pertinent reports  CT head without contrast    Result Date: 4/30/2022  Narrative: CT BRAIN - WITHOUT CONTRAST INDICATION:   Seizure, new-onset, no history of trauma seizure like activity  COMPARISON:  4/23/2022 TECHNIQUE:  CT examination of the brain was performed  In addition to axial images, sagittal and coronal 2D reformatted images were created and submitted for interpretation  Radiation dose length product (DLP) for this visit:  780 mGy-cm     This examination, like all CT scans performed in the [facility], was performed utilizing techniques to minimize radiation dose exposure, including the use of iterative reconstruction and automated exposure control  IMAGE QUALITY:  Diagnostic  FINDINGS: PARENCHYMA:  No intracranial mass, mass effect or midline shift  No CT signs of acute infarction  No acute parenchymal hemorrhage  VENTRICLES AND EXTRA-AXIAL SPACES:  Normal for the patient's age  VISUALIZED ORBITS AND PARANASAL SINUSES:  Unremarkable  CALVARIUM AND EXTRACRANIAL SOFT TISSUES:  Normal      Impression: No acute intracranial abnormality  Workstation performed: [id number]     CT head without contrast    Result Date: 4/23/2022  Narrative: CT BRAIN - WITHOUT CONTRAST INDICATION:   shaking  COMPARISON:  11/28/2020  TECHNIQUE:  CT examination of the brain was performed  In addition to axial images, sagittal and coronal 2D reformatted images were created and submitted for interpretation  Radiation dose length product (DLP) for this visit:  818 mGy-cm   This examination, like all CT scans performed in the [facility], was performed utilizing techniques to minimize radiation dose exposure, including the use of iterative reconstruction and automated exposure control  IMAGE QUALITY:  Diagnostic  FINDINGS: PARENCHYMA:  No intracranial mass, mass effect or midline shift  No CT signs of acute infarction  No acute parenchymal hemorrhage  VENTRICLES AND EXTRA-AXIAL SPACES:  Normal for the patient's age  VISUALIZED ORBITS AND PARANASAL SINUSES:  Unremarkable  CALVARIUM AND EXTRACRANIAL SOFT TISSUES:  Normal      Impression: No acute intracranial abnormality  Workstation performed: [id number]         ** Please Note: Dragon 360 Dictation voice to text software was used in the creation of this document   **

## 2022-05-01 NOTE — ASSESSMENT & PLAN NOTE
Patient has history of anxiety/depression and is on Lexapro  She does not currently follow with a therapist but as noted above, recommended that she find a therapist to assist with cognitive behavioral therapy for her nonepileptic spells

## 2022-05-01 NOTE — CONSULTS
Consultation - Neurology   Azul Argueta 44 y o  female MRN: [id number]  Unit/Bed#: [facility] 716-01 Encounter: [id number]      Assessment/Plan     * Psychogenic nonepileptic spells  Assessment & Plan  69-year-old female with chronic hypokalemia and hypocalcemia, status post gastric bypass, status post hysterectomy, hypertension, migraine headaches, anxiety/depression, suspicion of conversion disorder in the past, presents with shaking spells  Please see HPI for description of spells  Patient had 2 of her typical spells while on video EEG monitoring, which did not have EEG correlate  Rhythm remained alpha during the spells as discussed with Epileptology  As discussed with patient, spells are felt to be nonepileptic in origin and unrelated to her electrolyte abnormalities  Neurologic exam nonfocal   CT head on 04/25/2022 and 04/30/2022 with no acute changes  Plan:  -discussed with patient that it is recommended she find a therapist for cognitive behavioral therapy   -all questions answered and patient appeared relieved that she is not having epileptic seizures   -no further inpatient neurologic recommendations  Patient follows with Neurology at [facility] and should continue to do so for her migraines  History of anxiety  Assessment & Plan  Patient has history of anxiety/depression and is on Lexapro  She does not currently follow with a therapist but as noted above, recommended that she find a therapist to assist with cognitive behavioral therapy for her nonepileptic spells  Migraine  Assessment & Plan  Patient follows with Neurology at [facility] for her migraines and is currently on Elavil  Continue same  Azul Argueta will not need outpatient follow up with Neurology  She will not require outpatient neurological testing  Patient should continue to follow with her regular neurologist for her chronic migraines [facility])      History of Present Illness     Reason for Consult / Principal Problem:  Shaking spells  Hx and PE limited by:  N/A  HPI: Carlos Michael is a 44 y o  right handed female with chronic hypokalemia and hypocalcemia, status post gastric bypass, status post hysterectomy, hypertension, migraine headaches, anxiety/depression, suspicion of conversion disorder in the past, presents with shaking spells  Patient initially presented to [address] [place] [address] on 04/23/2022 with complaint of whole body spasms and generalized weakness  At that time, the movements were attributed to her hypokalemia as her potassium was found to be 2 2  She was discharged after her potassium was repleted but re-presented to the ED on 04/30/2022 as she had been having multiple spells consisting of head bobbing, eye rolling, and back arching  The episodes are preceded by a chest pressure and numbness/tingling in her bilateral hands and face  Each episode lasted approximately 5 minutes without any loss of consciousness or awareness  Patient notes she was given Ativan in the ED and had fallen asleep, and when she awoke, she had lost bowel continence but denies any bowel/bladder incontinence or tongue bite during the course of her spells  She denies any prior history of similar spells but was evaluated by the Neurology team in 2018 for episodes of tingling in her hands followed by muscle contraction of either upper extremity and generalized weakness  She was evaluated with MRI brain, C-spine, and MRA/MRV intracranial, as well as lumbar puncture  There was high suspicion at that time that the episodes may have represented conversion disorder  She denies recent illness, medication changes, sleep deprivation, or increased stressors  She denies any family history of epilepsy  CT head on 04/23/2022 and 04/30/2022 demonstrated no acute changes  Patient had 1 of her typical spells witnessed by this writer and by attending neurologist while on video EEG monitoring    Episode consisted of head bobbing, followed by eye rolling and head turning and back arching, and some tremulousness of the upper extremities  Patient was responsive during the event and able to answer all orienting questions correctly and recall 3 words given to her during spell  Speech was stuttering  She was able to move her extremities on command, which seemed to attenuate the shaking movements  When asked to tap her hand on the bed, head bobbing appeared to occur at the same frequency as the hand tapping  There was no bowel or bladder incontinence or tongue bite  Patient states she was able to recall everything that occurred during the spell  Per discussion with Epileptology, spell did not have any EEG correlate and rhythm remained in alpha but during the entirety of the episode  She also had near identical episode just prior to examiners entering room as discussed with nursing, which was also nonepileptic  Neurologic exam nonfocal     Inpatient consult to Neurology  Consult performed by: Neda Barreto PA-C  Consult ordered by: Jaclyn Noyola MD          Review of Systems   Constitutional: Negative  HENT: Negative  Eyes: Negative  Respiratory: Negative  Cardiovascular: Negative  Gastrointestinal: Negative  Endocrine: Negative  Genitourinary: Negative  Musculoskeletal: Negative  Skin: Negative for rash  Allergic/Immunologic: Negative  Neurological: Positive for tremors and numbness  Spells as noted above  Hematological: Negative  Psychiatric/Behavioral: Positive for sleep disturbance         Historical Information   Past Medical History:   Diagnosis Date    C  difficile colitis     DUB (dysfunctional uterine bleeding)     H  pylori infection     Hypertension     Hypokalemia     Left lower quadrant abdominal pain 8/17/2020    Migraine     Psychiatric disorder     Anxiety    Rectal bleeding     Renal disorder     Rhabdomyolysis      Past Surgical History:   Procedure Laterality Date    ABDOMINAL SURGERY      APPENDECTOMY       SECTION      CHOLECYSTECTOMY      COSMETIC SURGERY      "tummy tuck"    FL GUIDED CENTRAL VENOUS ACCESS DEVICE INSERTION  2018    GASTRIC BYPASS      HYSTERECTOMY      LAPAROSCOPY      NC SURG DIAGNOSTIC EXAM, ANORECTAL N/A 2021    Procedure: ANAL EXAM UNDER ANESTHESIA; HEMORRHOIDECTOMY;  Surgeon: Gary Rockwell DO;  Location:  MAIN OR;  Service: General    TUNNELED VENOUS PORT PLACEMENT N/A 2018    Procedure: INSERTION VENOUS PORT (PORT-A-CATH); Surgeon: Milo Fierro DO;  Location: [facility];  Service: General     Social History   Social History     Substance and Sexual Activity   Alcohol Use Never    Alcohol/week: 0 0 standard drinks    Comment: 0     Social History     Substance and Sexual Activity   Drug Use Never     E-Cigarette/Vaping    E-Cigarette Use Never User      E-Cigarette/Vaping Substances     Social History     Tobacco Use   Smoking Status Never Smoker   Smokeless Tobacco Never Used     Family History:   Family History   Problem Relation Age of Onset    No Known Problems Mother     Hypertension Father     Diabetes Father     Diabetes Maternal Grandfather        Review of previous medical records was completed  Meds/Allergies   PTA meds:   Prior to Admission Medications   Prescriptions Last Dose Informant Patient Reported? Taking? AMILoride 5 mg tablet 2022 at Unknown time Self No Yes   Sig: Take 1 tablet (5 mg total) by mouth daily   B Complex Vitamins (VITAMIN B COMPLEX PO) 2022 at Unknown time  Yes Yes   Sig: Take 1 capsule by mouth daily     Catheters MISC Not Taking at Unknown time  No No   Sig: by Does not apply route 2 (two) times a week Port care per [place] protocol   Patient not taking: Reported on 2022    Syringe/Needle, Disp, (SYRINGE 3CC/25GX5/8") 25G X 5/8" 3 ML MISC 2022 at Unknown time  No Yes   Sig: Use once monthly for B12 injection  amitriptyline (ELAVIL) 10 mg tablet 5/1/2022 at Unknown time  Yes Yes   Sig: Take 20 mg by mouth daily at bedtime     calcium carbonate (OYSTER SHELL,OSCAL) 500 mg 4/30/2022 at Unknown time  No Yes   Sig: Take 1 tablet by mouth 3 (three) times a day with meals   cyanocobalamin 1,000 mcg/mL Past Month at Unknown time  No Yes   Sig: Inject 1 mL (1,000 mcg total) into a muscle every 30 (thirty) days   ergocalciferol (ERGOCALCIFEROL) 1 25 MG (22959 UT) capsule Past Week at Unknown time  No Yes   Sig: Take 1 capsule (50,000 Units total) by mouth once a week Mondays and Thursdays   escitalopram (LEXAPRO) 10 mg tablet 4/30/2022 at Unknown time Self Yes Yes   Sig: Take 20 mg by mouth daily at bedtime    ferrous sulfate 325 (65 Fe) mg tablet Past Week at Unknown time  Yes Yes   Sig: Take 325 mg by mouth every other day Last took 3/9/20    lidocaine (Lidoderm) 5 %   No No   Sig: Apply 1 patch topically daily for 7 days Remove & Discard patch within 12 hours or as directed by MD   methocarbamol (ROBAXIN) 500 mg tablet 5/1/2022 at Unknown time  No Yes   Sig: Take 1 tablet (500 mg total) by mouth 2 (two) times a day   ondansetron (ZOFRAN) 4 mg tablet More than a month at Unknown time  No No   Sig: Take 1 tablet (4 mg total) by mouth every 8 (eight) hours as needed for nausea or vomiting   pancrelipase, Lip-Prot-Amyl, (Creon) 24,000 units 4/30/2022 at Unknown time  Yes Yes   Sig: Take by mouth see administration instructions 3 capsules TID W meal and 1 capsules with Snack    potassium chloride 0 4 mEq/mL 4/30/2022 at Unknown time  No Yes   Sig: Infuse 200 mL (80 mEq total) into a venous catheter daily 80 meq in 1 litre bag to be infused daily over 8 hours      Facility-Administered Medications: None       Allergies   Allergen Reactions    Nsaids Other (See Comments)     Other reaction(s): Other (Please comment)  Should not take s/p bariatric surgery  Other reaction(s):  Other (See Comments)  Should not take as per prior records  Should not take s/p bariatric surgery  Has hx of gastric bypass      Prednisone      Nausea, vomiting, diarrhea       Objective   Vitals:Blood pressure 124/96, pulse 85, temperature 98 3 °F (36 8 °C), temperature source Oral, resp  rate 14, height 5' 3" (1 6 m), weight 59 kg (130 lb), SpO2 98 %  ,Body mass index is 23 03 kg/m²  No intake or output data in the 24 hours ending 05/01/22 1039    Invasive Devices: Invasive Devices  Report    Central Venous Catheter Line            Port A Cath Right Subclavian -- days          Peripheral Intravenous Line            Peripheral IV 05/01/22 Left;Ventral (anterior) Forearm <1 day                Physical Exam   General:  Patient is well-developed, well-nourished, and in no acute distress  HEENT:  Head normocephalic  Eyes anicteric  Cardiovascular:  With regular rhythm  Lungs:  Normal effort  Nonlabored breathing  Extremities:  With no significant edema  Skin: No rashes  Neurologic Exam  Neurologic:  Patient is alert, pleasantly interactive, and appropriately conversational   No obvious symbolic language difficulty or dysarthria, and the patient is fully oriented  Gait deferred for safety  Cranial Nerves:   II: Visual fields full to confrontation  Pupils equal, round, reactive to light with normal accomodation  Cannot visualize optic fundi  III,IV,VI: extraocular movements intact with no nystagmus  V: Sensation in the V1 through V3 distributions intact to light touch bilaterally  VII: Face is symmetric with no weakness noted  VIII: Audition intact to finger rub bilaterally  IX/X: Uvula midline  Soft palate elevation symmetric  XII: Tongue midline with no atrophy or fasciculations with appropriate movement  Coordination:  Accurate with finger-to-nose and heel-to-shin maneuvers bilaterally  Motor testing with no upper or lower extremity drift  Full strength to confrontation throughout      Sensory testing grossly intact to light touch throughout  Reflexes 2+ bilateral upper and lower extremities  Dustin's negative  No clonus  Toe responses are downgoing bilaterally  Lab Results:   CBC:   Results from last 7 days   Lab Units 05/01/22  0250 04/30/22  1721 04/30/22  0904   WBC Thousand/uL 5 98 7 92 6 14   RBC Million/uL 4 18 3 86 4 39   HEMOGLOBIN g/dL 12 2 11 2* 12 9   HEMATOCRIT % 39 0 36 3 41 2   MCV fL 93 94 94   PLATELETS Thousands/uL 269 270 267   , BMP/CMP:   Results from last 7 days   Lab Units 05/01/22  0250 04/30/22  1721 04/30/22  0904   SODIUM mmol/L 139 136 137   POTASSIUM mmol/L 3 4* 2 9* 3 8   CHLORIDE mmol/L 112* 106 108   CO2 mmol/L 24 21 20*   BUN mg/dL 8 10 9   CREATININE mg/dL 0 94 0 92 0 94   CALCIUM mg/dL 8 2* 7 5* 8 1*   AST U/L 28 32 34   ALT U/L 31 33 37   ALK PHOS U/L 53 59 56   EGFR ml/min/1 73sq m 77 79 77   , Vitamin B12:   Results from last 7 days   Lab Units 04/25/22  0453   VITAMIN B 12 pg/mL 164     Imaging Studies: I have personally reviewed pertinent reports  and I have personally reviewed pertinent films in PACS 14 [facility] x2  EKG, Pathology, and Other Studies: I have personally reviewed pertinent reports      VTE Prophylaxis: Sequential compression device Clarisse Guadalupe)     Code Status: Level 1 - Full Code  Advance Directive and Living Will:      Power of :    POLST:

## 2022-05-01 NOTE — ASSESSMENT & PLAN NOTE
· Patient was seen in [facility] due to recurrent hypokalemia and hypocalcemia with generalized weakness and involving shaking of the whole body and upper extremities although she can follow commands as per their notes  Questionable seizures although pseudoseizures is a likely possibility  Patient is transferred to [facility] for further evaluation with video EEG has recommended by Neurology Fransisco Yusuf)  · Patient states that her Creed Brandt begins by having facial numbness which then leads to stiffness and then periodic shaking approximately 5 minutes  It is therefore possible that the initial numbness and stiffness may be secondary to hypokalemia with hypocalcemic tetany  · Will consult Neurology  · For video EEG  · As of the moment neurology does not recommend antiepileptic medications until result of video EEG are obtained  · Seizure precaution  · Neuro checks per protocol  · Patient claims also that she has insomnia due to these shaking episodes; will place Restoril is 15 mg at night  · Her shakiness also resolves with Ativan which is placed jeanette Choudhary

## 2022-05-02 ENCOUNTER — APPOINTMENT (OUTPATIENT)
Dept: LAB | Facility: HOSPITAL | Age: 37
End: 2022-05-02
Payer: COMMERCIAL

## 2022-05-02 LAB
ATRIAL RATE: 89 BPM
P AXIS: 66 DEGREES
PR INTERVAL: 116 MS
QRS AXIS: 39 DEGREES
QRSD INTERVAL: 90 MS
QT INTERVAL: 356 MS
QTC INTERVAL: 433 MS
T WAVE AXIS: 62 DEGREES
VENTRICULAR RATE: 89 BPM

## 2022-05-02 NOTE — UTILIZATION REVIEW
Notification of Discharge   This is a Notification of Discharge from our facility [address]  Please be advised that this patient has been discharge from our facility  Below you will find the admission and discharge date and time including the patients disposition  UTILIZATION REVIEW CONTACT:  Nelida Hernández  Utilization   Network Utilization Review Department  Phone: [phone] x carefully listen to the prompts  All voicemails are confidential   Email: [email or web address]     PHYSICIAN ADVISORY SERVICES:  FOR ONPL-GM-XQWT REVIEW - MEDICAL NECESSITY DENIAL  Phone: [phone]  Fax: [phone]  Email: [email or web address]     PRESENTATION DATE: 5/1/2022  1:55 AM  OBERVATION ADMISSION DATE:   INPATIENT ADMISSION DATE: 5/1/22  1:55 AM   DISCHARGE DATE: 5/1/2022  3:00 PM  DISPOSITION: Home/Self Care Home/Self Care      IMPORTANT INFORMATION:  Send all requests for admission clinical reviews, approved or denied determinations and any other requests to dedicated fax number below belonging to the campus where the patient is receiving treatment   List of dedicated fax numbers:  [address] DENIALS (Administrative/Medical Necessity) [phone]   [address] (Maternity/NICU/Pediatrics) [phone]   Luzma List [phone]   [address] [facility] [phone]   [address] [place] [address] [phone]   [address] [phone]   [facility]  [phone]   [address] [place] [address], [facility]  2401 [facility] 1000 [facility] [phone]

## 2022-05-02 NOTE — UTILIZATION REVIEW
Inpatient Admission Authorization Request   NOTIFICATION OF INPATIENT ADMISSION/INPATIENT AUTHORIZATION REQUEST   SERVICING FACILITY:   [facility]  Address: [address] [facility] [address]  Tax ID: [id number]  NPI: [id number]  Place of Service: Inpatient [address] [facility] Code: 24     ATTENDING PROVIDER:  Attending Name and NPI#: Aleida Aguilar [[id number]]  Address: [address] [facility] [address]  Phone: [phone]     UTILIZATION REVIEW CONTACT:  Mitesh Berrios Utilization   Network Utilization Review Department  Phone: [phone]  Fax: [phone]  Email: [email or web address]     PHYSICIAN ADVISORY SERVICES:  FOR TYNI-XA-DXAC REVIEW - MEDICAL NECESSITY DENIAL  Phone: [phone]  Fax: [phone]  Email: [email or web address]     TYPE OF REQUEST:  Inpatient Status     ADMISSION INFORMATION:  ADMISSION DATE/TIME: 5/1/22  1:55 AM  PATIENT DIAGNOSIS CODE/DESCRIPTION:  Seizure ([facility] [address] ) [R56 9]  DISCHARGE DATE/TIME: 5/1/2022  3:00 PM   IMPORTANT INFORMATION:  Please contact the Mitesh Berrios directly with any questions or concerns regarding this request  Department voicemails are confidential     Send requests for admission clinical reviews, concurrent reviews, approvals, and administrative denials due to lack of clinical to fax [phone]

## 2022-05-02 NOTE — UTILIZATION REVIEW
Initial Clinical Review    Admission: Date/Time/Statement:   Admission Orders (From admission, onward)     Ordered        05/01/22 0156  Inpatient Admission  Once                       Inpatient Admission     Standing Status:   Standing     Number of Occurrences:   1     Order Specific Question:   Level of Care     Answer:   Level 2 Stepdown / HOT [14]     Order Specific Question:   Estimated length of stay     Answer:   More than 2 Midnights     Order Specific Question:   Certification     Answer:   I certify that inpatient services are medically necessary for this patient for a duration of greater than two midnights  See H&P and MD Progress Notes for additional information about the patient's course of treatment  4/30/2022 - 5/1/2022 (8 hours)  [facility] Emergency Department  Seizure like activity, hypokalemia  Transfer to [facility] for higher level of care  Prior to arrival: iv dextrose, iv kcl, iv mag so5, kdur, iv calcium gluconate    · Patient was seen in [address] [place] [address] due to recurrent hypokalemia and hypocalcemia with generalized weakness and involving shaking of the whole body and upper extremities although she can follow commands as per their notes  Questionable seizures although pseudoseizures is a likely possibility  Patient is transferred to [facility] for further evaluation with video EEG has recommended by Neurology   · Patient states that her Sherol Lady begins by having facial numbness which then leads to stiffness and then periodic shaking approximately 5 minutes  It is therefore possible that the initial numbness and stiffness may be secondary to hypokalemia with hypocalcemic tetany      Initial Presentation: 44 y o  female received from [facility] ed for inpatient step down admission to further evaluate and treat possible seizures  Plan includes consult neurology, seizure precautions, neuro checks, video EEG monitoring      Neurology consult  During interview and exam pt had a witnessed event lasting minutes that resolved without intervention  This was captured on EEG and consisted of head bobbing with variable jerking of one or both arms, all with varying frequency and intensity  She had entrainment where tapping of her hand led to the same frequency of shaking throughout  She was fully oriented and responsive during the event  Movements abated with use of an arm  Soon before this event she had another that was captured while EEG was initially set up - this was identical to the current spell, confirmed by nursing and patient  Pt confirmed that this spell was typical for what she had been having for days now      Reviewed EEG with Dr Luis Torres and confirmed to be non-epileptic  EEG to be discontinued  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 and she was amenable to establishing care with a mental health professional for therapy  She does not require any further neurodiagnostics or work-up from our standpoint  No further recs; call with questions  Discharge summary  5-1-22     44 y o  female patient who originally presented to the hospital on 5/1/2022 due to seizure-like activity  She was evaluated by Neurology and monitor on video EEG which was negative for any epileptic activity even while she had her shaking attacks  Patient was recommend to follow up with Psychiatry and was discharged on 5/1/22       Arrival [05/01/22 0211]   98 3 °F (36 8 °C) 68 18 125/68 99 %      Oral -- -- -- --      Pain Score       3          05/01/22 59 kg (130 lb)     Additional Vital Signs:     Date/Time Temp Pulse Resp BP MAP (mmHg) SpO2 O2 Device   05/01/22 09:29:13 -- 85 -- 124/96 105 98 % --   05/01/22 0800 -- -- -- -- -- -- None (Room air)   05/01/22 06:52:35 -- 68 14 112/73 86 100 % --   05/01/22 04:08:09 -- 107   Abnormal  -- 107/59 75 100 % --   05/01/22 0300 -- -- -- -- -- -- None (Room air)   05/01/22 02:11:21 98 3 °F (36 8 °C) 68 18 125/68 87 99 % --     Date and Time Eye Opening Best Verbal Response Best Motor Response Vangie Coma Scale Score   05/01/22 0800 4 5 6 15   05/01/22 0400 4 5 6 15   05/01/22 0300 4 5 6 15             Pertinent Labs/Diagnostic Test Results:     Results from last 7 days   Lab Units 04/30/22  1721   SARS-COV-2  Negative     Results from last 7 days   Lab Units 05/01/22  0250 04/30/22  1721 04/30/22  0904   WBC Thousand/uL 5 98 7 92 6 14   HEMOGLOBIN g/dL 12 2 11 2* 12 9   HEMATOCRIT % 39 0 36 3 41 2   PLATELETS Thousands/uL 269 270 267   NEUTROS ABS Thousands/µL 3 48 5 14 3 53         Results from last 7 days   Lab Units 05/01/22 0250 04/30/22  1721 04/30/22  0904   SODIUM mmol/L 139 136 137   POTASSIUM mmol/L 3 4* 2 9* 3 8   CHLORIDE mmol/L 112* 106 108   CO2 mmol/L 24 21 20*   ANION GAP mmol/L 3* 9 9   BUN mg/dL 8 10 9   CREATININE mg/dL 0 94 0 92 0 94   EGFR ml/min/1 73sq m 77 79 77   CALCIUM mg/dL 8 2* 7 5* 8 1*   MAGNESIUM mg/dL 2 0 2 0 2 1   PHOSPHORUS mg/dL 3 1  --   --      Results from last 7 days   Lab Units 05/01/22 0250 04/30/22  1721 04/30/22  0904   AST U/L 28 32 34   ALT U/L 31 33 37   ALK PHOS U/L 53 59 56   TOTAL PROTEIN g/dL 6 1* 5 9* 6 8   ALBUMIN g/dL 2 8* 2 6* 3 1*   TOTAL BILIRUBIN mg/dL 0 35 0 19* 0 18*   BILIRUBIN DIRECT mg/dL  --  0 03  --      Results from last 7 days   Lab Units 04/30/22  1728 04/30/22  1018 04/30/22  0948 04/30/22  0909   POC GLUCOSE mg/dl 63* 73 67 63*     Results from last 7 days   Lab Units 05/01/22  0250 04/30/22  1721 04/30/22  0904   GLUCOSE RANDOM mg/dL 82 84 69       Results from last 7 days   Lab Units 04/30/22  1721   CK TOTAL U/L 39         Results from last 7 days   Lab Units 05/01/22  0250 04/30/22  1721   TSH 3RD GENERATON uIU/mL 1 450 1 520         Results from last 7 days   Lab Units 04/30/22  0904   LACTIC ACID mmol/L 1 5       Results from last 7 days   Lab Units 05/01/22  0250 04/30/22  1757   CLARITY UA  Clear Clear   COLOR UA  Light Yellow Yellow   SPEC GRAV UA  1 016 >=1 030   PH UA  6 0 6 0   GLUCOSE UA mg/dl Negative Negative   KETONES UA mg/dl Negative Negative   BLOOD UA  Negative Negative   PROTEIN UA mg/dl Trace* Negative   NITRITE UA  Negative Negative   BILIRUBIN UA  Negative Negative   UROBILINOGEN UA E U /dl  --  0 2   UROBILINOGEN UA (BE) mg/dl <2 0  --    LEUKOCYTES UA  Negative Negative   WBC UA /hpf None Seen  --    RBC UA /hpf None Seen  --    BACTERIA UA /hpf None Seen  --    EPITHELIAL CELLS WET PREP /hpf Occasional  --    MUCUS THREADS  Occasional*  --      Results from last 7 days   Lab Units 04/30/22  1721   INFLUENZA A PCR  Negative   INFLUENZA B PCR  Negative   RSV PCR  Negative         Results from last 7 days   Lab Units 04/30/22  1757   AMPH/METH  Negative   BARBITURATE UR  Negative   BENZODIAZEPINE UR  Negative   COCAINE UR  Negative   METHADONE URINE  Negative   OPIATE UR  Negative   PCP UR  Negative   THC UR  Positive*     Results from last 7 days   Lab Units 04/30/22  0904   ETHANOL LVL mg/dL <3       ED Treatment:   Medication Administration - No Administrations Displayed (No Start Event Found)     None        Past Medical History:   Diagnosis Date    C  difficile colitis     DUB (dysfunctional uterine bleeding)     H  pylori infection     Hypertension     Hypokalemia     Left lower quadrant abdominal pain 8/17/2020    Migraine     Psychiatric disorder     Anxiety    Rectal bleeding     Renal disorder     Rhabdomyolysis      Present on Admission:   Vitamin B12 deficiency   Vitamin D insufficiency   Hypokalemia   Chronic pancreatitis (HCC)   Hypocalcemia   Migraine      Admitting Diagnosis: Seizure ([facility] 75 ) [R56 9]     Age/Sex: 44 y o  female        Frequency    acetaminophen (TYLENOL) tablet 650 mg  S    Every 6 hours PRN    aluminum-magnesium hydroxide-simethicone (MYLANTA) oral suspension 30 mL         Every 6 hours PRN    AMILoride tablet 5 mg      Daily    amitriptyline (ELAVIL) tablet 10 mg      Daily at bedtime    calcium carbonate (OYSTER SHELL,OSCAL) 500 mg tablet 1 tablet         3 times daily with meals    docusate sodium (COLACE) capsule 100 mg          2 times daily PRN    enoxaparin (LOVENOX) subcutaneous injection 40 mg  (lovenox (ENOXAPARIN) Sub-Q + platelet count)      Daily    ergocalciferol (VITAMIN D2) capsule 50,000 Units        Once per day on Mon Thu    escitalopram (LEXAPRO) tablet 10 mg      Daily    LORazepam (ATIVAN) injection 2 mg      Every 8 hours PRN    methocarbamol (ROBAXIN) tablet 500 mg          2 times daily    ondansetron (ZOFRAN) injection 4 mg           Every 6 hours PRN    pancrelipase (Lip-Prot-Amyl) (CREON) delayed release capsule 24,000 Units         3 times daily with meals    potassium chloride 20 mEq IVPB (premix)       Daily    temazepam (RESTORIL) capsule 15 mg      Daily at bedtime PRN       IP CONSULT TO NEUROLOGY    Network Utilization Review Department  ATTENTION: Please call with any questions or concerns to [phone] and carefully listen to the prompts so that you are directed to the right person  All voicemails are confidential   Anenmarie Buck all requests for admission clinical reviews, approved or denied determinations and any other requests to dedicated fax number below belonging to the campus where the patient is receiving treatment   List of dedicated fax numbers for the Facilities:  [address] DENIALS (Administrative/Medical Necessity) [phone]   [address]  (Maternity/NICU/Pediatrics) [address] [place] [address],[address]  [address] [place] Isela Engoniel Nimesh [address] [address] [address]  [address] [facility] Cecy Hu [phone] [facility] [phone]

## 2022-05-03 ENCOUNTER — TELEPHONE (OUTPATIENT)
Dept: NEPHROLOGY | Facility: CLINIC | Age: 37
End: 2022-05-03

## 2022-05-03 NOTE — TELEPHONE ENCOUNTER
Karma Hughes, Infusion Nurse called, patient d/c'd from hospital   She will need resumption orders to take care of port and also to continue infusion  I will fax to Joy Fernández

## 2022-05-03 NOTE — TELEPHONE ENCOUNTER
Please have than forward orders to us so that I can sign and have them faxed back  Will continue same doses of potassium, 80 mEq IV daily, and Port-A-Cath care to resume as previous

## 2022-05-05 PROCEDURE — 95813 EEG EXTND MNTR 61-119 MIN: CPT | Performed by: PSYCHIATRY & NEUROLOGY

## 2022-05-09 ENCOUNTER — APPOINTMENT (OUTPATIENT)
Dept: LAB | Facility: HOSPITAL | Age: 37
End: 2022-05-09
Payer: COMMERCIAL

## 2022-05-16 ENCOUNTER — APPOINTMENT (OUTPATIENT)
Dept: LAB | Facility: HOSPITAL | Age: 37
End: 2022-05-16
Payer: COMMERCIAL

## 2022-05-19 ENCOUNTER — TELEPHONE (OUTPATIENT)
Dept: NEPHROLOGY | Facility: CLINIC | Age: 37
End: 2022-05-19

## 2022-05-19 NOTE — TELEPHONE ENCOUNTER
Jazmine Zarco from [facility] called stating that patient has gone away for the next couple of days and they won't be able to change her dressings  She states that patient went away either Tuesday evening/Wednesday morning  Jazmine Zarco is concerned because it will be like 14 days till they will be able to change it  She did suggest that patient go to an ER to have that changed wherever she was at but she stated that patient wasn't to keen on that

## 2022-05-20 NOTE — TELEPHONE ENCOUNTER
Patient aware to go to the ED if she needs her wound dressing changed and if she is not feeling well

## 2022-05-26 ENCOUNTER — APPOINTMENT (OUTPATIENT)
Dept: LAB | Facility: HOSPITAL | Age: 37
End: 2022-05-26
Payer: COMMERCIAL

## 2022-06-06 ENCOUNTER — APPOINTMENT (OUTPATIENT)
Dept: LAB | Facility: HOSPITAL | Age: 37
End: 2022-06-06
Payer: COMMERCIAL

## 2022-06-21 ENCOUNTER — APPOINTMENT (OUTPATIENT)
Dept: LAB | Facility: CLINIC | Age: 37
End: 2022-06-21
Payer: COMMERCIAL

## 2022-06-21 DIAGNOSIS — E87.6 HYPOKALEMIA: ICD-10-CM

## 2022-06-21 LAB
ANION GAP SERPL CALCULATED.3IONS-SCNC: 9 MMOL/L (ref 4–13)
BUN SERPL-MCNC: 16 MG/DL (ref 5–25)
CALCIUM SERPL-MCNC: 8.7 MG/DL (ref 8.3–10.1)
CHLORIDE SERPL-SCNC: 104 MMOL/L (ref 100–108)
CO2 SERPL-SCNC: 22 MMOL/L (ref 21–32)
CREAT SERPL-MCNC: 1 MG/DL (ref 0.6–1.3)
GFR SERPL CREATININE-BSD FRML MDRD: 71 ML/MIN/1.73SQ M
GLUCOSE P FAST SERPL-MCNC: 87 MG/DL (ref 65–99)
POTASSIUM SERPL-SCNC: 3.1 MMOL/L (ref 3.5–5.3)
SODIUM SERPL-SCNC: 135 MMOL/L (ref 136–145)

## 2022-06-21 PROCEDURE — 80048 BASIC METABOLIC PNL TOTAL CA: CPT

## 2022-06-21 PROCEDURE — 36415 COLL VENOUS BLD VENIPUNCTURE: CPT

## 2022-06-23 ENCOUNTER — TELEPHONE (OUTPATIENT)
Dept: NEPHROLOGY | Facility: CLINIC | Age: 37
End: 2022-06-23

## 2022-06-23 NOTE — TELEPHONE ENCOUNTER
Order written as   "Due to decreased  K+ levels, patient to infuse extra potassium hydration bag on the next two days  Continue with prescribed kcl hydration  Sandra Wang at [facility] will send order over to be signed

## 2022-06-23 NOTE — TELEPHONE ENCOUNTER
Spoke with Jose Leon at [facility], he will send order over for signature

## 2022-06-23 NOTE — TELEPHONE ENCOUNTER
Call from Bandar at [facility]  Patients potassium level 3 1 on 6/21/22  Do you want her to double up on her infusion like normal?  He needs to know what to send her for supply

## 2022-06-27 ENCOUNTER — APPOINTMENT (OUTPATIENT)
Dept: LAB | Facility: CLINIC | Age: 37
End: 2022-06-27
Payer: COMMERCIAL

## 2022-06-27 DIAGNOSIS — E87.6 HYPOKALEMIA: Primary | ICD-10-CM

## 2022-06-27 DIAGNOSIS — R53.83 FATIGUE, UNSPECIFIED TYPE: ICD-10-CM

## 2022-06-27 LAB
ANION GAP SERPL CALCULATED.3IONS-SCNC: 7 MMOL/L (ref 4–13)
BUN SERPL-MCNC: 13 MG/DL (ref 5–25)
CALCIUM SERPL-MCNC: 8.7 MG/DL (ref 8.3–10.1)
CHLORIDE SERPL-SCNC: 105 MMOL/L (ref 100–108)
CO2 SERPL-SCNC: 25 MMOL/L (ref 21–32)
CREAT SERPL-MCNC: 1.04 MG/DL (ref 0.6–1.3)
GFR SERPL CREATININE-BSD FRML MDRD: 68 ML/MIN/1.73SQ M
GLUCOSE SERPL-MCNC: 112 MG/DL (ref 65–140)
POTASSIUM SERPL-SCNC: 3.4 MMOL/L (ref 3.5–5.3)
SODIUM SERPL-SCNC: 137 MMOL/L (ref 136–145)

## 2022-06-27 PROCEDURE — 80048 BASIC METABOLIC PNL TOTAL CA: CPT

## 2022-06-27 PROCEDURE — 36415 COLL VENOUS BLD VENIPUNCTURE: CPT

## 2022-07-07 NOTE — PRE-PROCEDURE INSTRUCTIONS
Pre-Surgery Instructions:   Medication Instructions    AMILoride 5 mg tablet Take day of surgery   amitriptyline (ELAVIL) 10 mg tablet Take night before surgery    B Complex Vitamins (VITAMIN B COMPLEX PO) Hold day of surgery   calcium carbonate (OYSTER SHELL,OSCAL) 500 mg Hold day of surgery   cyanocobalamin 1,000 mcg/mL monthly injection    ergocalciferol (ERGOCALCIFEROL) 1 25 MG (49684 UT) capsule Hold day of surgery   escitalopram (LEXAPRO) 10 mg tablet Take night before surgery    ferrous sulfate 325 (65 Fe) mg tablet Hold day of surgery   ondansetron (ZOFRAN) 4 mg tablet Uses PRN- OK to take day of surgery    potassium chloride 0 4 mEq/mL Pt does infusion at night    Syringe/Needle, Disp, (SYRINGE 3CC/25GX5/8") 25G X 5/8" 3 ML MISC Take day of surgery     See above- pt will take am med with a small sip of water     Pt instructed to not take vitamins, supplements, aspirin or NSAIDS until after surgery

## 2022-07-08 ENCOUNTER — ANESTHESIA (OUTPATIENT)
Dept: PERIOP | Facility: HOSPITAL | Age: 37
End: 2022-07-08
Payer: COMMERCIAL

## 2022-07-08 ENCOUNTER — ANESTHESIA EVENT (OUTPATIENT)
Dept: PERIOP | Facility: HOSPITAL | Age: 37
End: 2022-07-08
Payer: COMMERCIAL

## 2022-07-08 ENCOUNTER — HOSPITAL ENCOUNTER (OUTPATIENT)
Facility: HOSPITAL | Age: 37
Setting detail: OUTPATIENT SURGERY
Discharge: HOME | End: 2022-07-08
Attending: STUDENT IN AN ORGANIZED HEALTH CARE EDUCATION/TRAINING PROGRAM | Admitting: STUDENT IN AN ORGANIZED HEALTH CARE EDUCATION/TRAINING PROGRAM
Payer: COMMERCIAL

## 2022-07-08 VITALS
DIASTOLIC BLOOD PRESSURE: 57 MMHG | OXYGEN SATURATION: 99 % | RESPIRATION RATE: 20 BRPM | BODY MASS INDEX: 23.04 KG/M2 | TEMPERATURE: 97.6 F | HEIGHT: 63 IN | WEIGHT: 130 LBS | HEART RATE: 94 BPM | SYSTOLIC BLOOD PRESSURE: 111 MMHG

## 2022-07-08 DIAGNOSIS — K64.5 THROMBOSED EXTERNAL HEMORRHOID: ICD-10-CM

## 2022-07-08 DIAGNOSIS — K61.0 PERIANAL ABSCESS: Primary | ICD-10-CM

## 2022-07-08 LAB
ANION GAP SERPL CALCULATED.3IONS-SCNC: 11 MMOL/L (ref 4–13)
ANION GAP SERPL CALCULATED.3IONS-SCNC: 12 MMOL/L (ref 4–13)
BUN SERPL-MCNC: 15 MG/DL (ref 5–25)
BUN SERPL-MCNC: 15 MG/DL (ref 5–25)
CALCIUM SERPL-MCNC: 8 MG/DL (ref 8.3–10.1)
CALCIUM SERPL-MCNC: 8.3 MG/DL (ref 8.3–10.1)
CHLORIDE SERPL-SCNC: 107 MMOL/L (ref 100–108)
CHLORIDE SERPL-SCNC: 108 MMOL/L (ref 100–108)
CO2 SERPL-SCNC: 18 MMOL/L (ref 21–32)
CO2 SERPL-SCNC: 20 MMOL/L (ref 21–32)
CREAT SERPL-MCNC: 2.17 MG/DL (ref 0.6–1.3)
CREAT SERPL-MCNC: 2.52 MG/DL (ref 0.6–1.3)
GFR SERPL CREATININE-BSD FRML MDRD: 23 ML/MIN/1.73SQ M
GFR SERPL CREATININE-BSD FRML MDRD: 28 ML/MIN/1.73SQ M
GLUCOSE P FAST SERPL-MCNC: 118 MG/DL (ref 65–99)
GLUCOSE P FAST SERPL-MCNC: 81 MG/DL (ref 65–99)
GLUCOSE SERPL-MCNC: 118 MG/DL (ref 65–140)
GLUCOSE SERPL-MCNC: 81 MG/DL (ref 65–140)
POTASSIUM SERPL-SCNC: 3.8 MMOL/L (ref 3.5–5.3)
POTASSIUM SERPL-SCNC: 4 MMOL/L (ref 3.5–5.3)
SODIUM SERPL-SCNC: 138 MMOL/L (ref 136–145)
SODIUM SERPL-SCNC: 138 MMOL/L (ref 136–145)

## 2022-07-08 PROCEDURE — 88304 TISSUE EXAM BY PATHOLOGIST: CPT | Performed by: PATHOLOGY

## 2022-07-08 PROCEDURE — 80048 BASIC METABOLIC PNL TOTAL CA: CPT | Performed by: STUDENT IN AN ORGANIZED HEALTH CARE EDUCATION/TRAINING PROGRAM

## 2022-07-08 RX ORDER — ROCURONIUM BROMIDE 10 MG/ML
INJECTION, SOLUTION INTRAVENOUS AS NEEDED
Status: DISCONTINUED | OUTPATIENT
Start: 2022-07-08 | End: 2022-07-08

## 2022-07-08 RX ORDER — ONDANSETRON 2 MG/ML
4 INJECTION INTRAMUSCULAR; INTRAVENOUS ONCE AS NEEDED
Status: DISCONTINUED | OUTPATIENT
Start: 2022-07-08 | End: 2022-07-08 | Stop reason: HOSPADM

## 2022-07-08 RX ORDER — PROPOFOL 10 MG/ML
INJECTION, EMULSION INTRAVENOUS AS NEEDED
Status: DISCONTINUED | OUTPATIENT
Start: 2022-07-08 | End: 2022-07-08

## 2022-07-08 RX ORDER — FENTANYL CITRATE 50 UG/ML
INJECTION, SOLUTION INTRAMUSCULAR; INTRAVENOUS
Status: COMPLETED
Start: 2022-07-08 | End: 2022-07-08

## 2022-07-08 RX ORDER — BUPIVACAINE HYDROCHLORIDE AND EPINEPHRINE 2.5; 5 MG/ML; UG/ML
INJECTION, SOLUTION INFILTRATION; PERINEURAL AS NEEDED
Status: DISCONTINUED | OUTPATIENT
Start: 2022-07-08 | End: 2022-07-08 | Stop reason: HOSPADM

## 2022-07-08 RX ORDER — ONDANSETRON 2 MG/ML
INJECTION INTRAMUSCULAR; INTRAVENOUS AS NEEDED
Status: DISCONTINUED | OUTPATIENT
Start: 2022-07-08 | End: 2022-07-08

## 2022-07-08 RX ORDER — FENTANYL CITRATE/PF 50 MCG/ML
25 SYRINGE (ML) INJECTION
Status: COMPLETED | OUTPATIENT
Start: 2022-07-08 | End: 2022-07-08

## 2022-07-08 RX ORDER — DIPHENHYDRAMINE HYDROCHLORIDE 50 MG/ML
12.5 INJECTION INTRAMUSCULAR; INTRAVENOUS ONCE AS NEEDED
Status: DISCONTINUED | OUTPATIENT
Start: 2022-07-08 | End: 2022-07-08 | Stop reason: HOSPADM

## 2022-07-08 RX ORDER — LIDOCAINE HYDROCHLORIDE 10 MG/ML
INJECTION, SOLUTION EPIDURAL; INFILTRATION; INTRACAUDAL; PERINEURAL AS NEEDED
Status: DISCONTINUED | OUTPATIENT
Start: 2022-07-08 | End: 2022-07-08

## 2022-07-08 RX ORDER — SODIUM CHLORIDE, SODIUM LACTATE, POTASSIUM CHLORIDE, CALCIUM CHLORIDE 600; 310; 30; 20 MG/100ML; MG/100ML; MG/100ML; MG/100ML
INJECTION, SOLUTION INTRAVENOUS CONTINUOUS PRN
Status: DISCONTINUED | OUTPATIENT
Start: 2022-07-08 | End: 2022-07-08

## 2022-07-08 RX ORDER — FENTANYL CITRATE 50 UG/ML
INJECTION, SOLUTION INTRAMUSCULAR; INTRAVENOUS AS NEEDED
Status: DISCONTINUED | OUTPATIENT
Start: 2022-07-08 | End: 2022-07-08

## 2022-07-08 RX ORDER — DEXAMETHASONE SODIUM PHOSPHATE 10 MG/ML
INJECTION, SOLUTION INTRAMUSCULAR; INTRAVENOUS AS NEEDED
Status: DISCONTINUED | OUTPATIENT
Start: 2022-07-08 | End: 2022-07-08

## 2022-07-08 RX ORDER — MIDAZOLAM HYDROCHLORIDE 2 MG/2ML
INJECTION, SOLUTION INTRAMUSCULAR; INTRAVENOUS AS NEEDED
Status: DISCONTINUED | OUTPATIENT
Start: 2022-07-08 | End: 2022-07-08

## 2022-07-08 RX ORDER — OXYCODONE HYDROCHLORIDE 5 MG/1
5 TABLET ORAL EVERY 4 HOURS PRN
Qty: 15 TABLET | Refills: 0 | Status: SHIPPED | OUTPATIENT
Start: 2022-07-08 | End: 2022-09-03

## 2022-07-08 RX ADMIN — FENTANYL CITRATE 25 MCG: 50 INJECTION INTRAMUSCULAR; INTRAVENOUS at 15:10

## 2022-07-08 RX ADMIN — DEXAMETHASONE SODIUM PHOSPHATE 10 MG: 10 INJECTION INTRAMUSCULAR; INTRAVENOUS at 14:22

## 2022-07-08 RX ADMIN — PROPOFOL 200 MG: 10 INJECTION, EMULSION INTRAVENOUS at 14:07

## 2022-07-08 RX ADMIN — ONDANSETRON 4 MG: 2 INJECTION INTRAMUSCULAR; INTRAVENOUS at 14:22

## 2022-07-08 RX ADMIN — FENTANYL CITRATE 25 MCG: 50 INJECTION INTRAMUSCULAR; INTRAVENOUS at 14:55

## 2022-07-08 RX ADMIN — FENTANYL CITRATE 25 MCG: 50 INJECTION INTRAMUSCULAR; INTRAVENOUS at 15:00

## 2022-07-08 RX ADMIN — SUGAMMADEX 200 MG: 100 INJECTION, SOLUTION INTRAVENOUS at 14:37

## 2022-07-08 RX ADMIN — ROCURONIUM BROMIDE 30 MG: 50 INJECTION, SOLUTION INTRAVENOUS at 14:07

## 2022-07-08 RX ADMIN — SODIUM CHLORIDE, SODIUM LACTATE, POTASSIUM CHLORIDE, AND CALCIUM CHLORIDE: .6; .31; .03; .02 INJECTION, SOLUTION INTRAVENOUS at 14:01

## 2022-07-08 RX ADMIN — MIDAZOLAM 2 MG: 1 INJECTION INTRAMUSCULAR; INTRAVENOUS at 14:03

## 2022-07-08 RX ADMIN — FENTANYL CITRATE 50 MCG: 50 INJECTION INTRAMUSCULAR; INTRAVENOUS at 14:07

## 2022-07-08 RX ADMIN — LIDOCAINE HYDROCHLORIDE 50 MG: 10 INJECTION, SOLUTION EPIDURAL; INFILTRATION; INTRACAUDAL; PERINEURAL at 14:07

## 2022-07-08 RX ADMIN — FENTANYL CITRATE 25 MCG: 50 INJECTION INTRAMUSCULAR; INTRAVENOUS at 15:05

## 2022-07-08 NOTE — OP NOTE
OPERATIVE REPORT  PATIENT NAME: Ben Amin    :  1985  MRN: [id number]  Pt Location: OW OR ROOM 01    SURGERY DATE: 2022    Surgeon(s) and Role:     * Mya Veras DO - Primary     * Eyal Nunes PA-C    Preop Diagnosis: Thrombosed external hemorrhoid [K64 5]    Post-Op Diagnosis Codes:  Perianal abscess and posterior anal fistula, enlarged external hemorrhoid    Procedure:  Anal examination under anesthesia, drainage of perianal abscess and fistulotomy, external hemorrhoidectomy, removal of perianal skin tag    Specimen(s):  ID Type Source Tests Collected by Time Destination   1 : perianal skin tag Tissue Skin, Cyst/Tag/Debridement TISSUE EXAM Mya Veras DO 2022  2:32 PM    2 : Hemorrhoid Tissue Hemorrhoids TISSUE EXAM Mya Veras DO 2022  2:36 PM        Estimated Blood Loss:   Minimal    Drains:  * No LDAs found *    Anesthesia Type:   General    Operative Indications: Thrombosed external hemorrhoid [K64 5]      Operative Findings:  The patient did have a small right anterior enlarged external hemorrhoid with possible slight thrombosis, posteriorly there was a small perianal abscess present, this communicated with a fistula extending superficially to the dentate line  The fistula was extra sphincteric    Complications:   None    Procedure and Technique:  The patient is brought the operating  A time-out was held the patient identified and procedure verified  Appropriate antibiotic prophylaxis and DVT prophylaxis was used  General anesthesia was administered  The patient was carefully placed on the operating table in the prone position  All pressure points were padded  The gluteus was retracted using tape  The area of the anus was prepped and draped in usual sterile fashion using Betadine solution  A perianal block was administered using 1% lidocaine with epinephrine    On external examination a posterior perianal abscess was identified, this is where was thought a thrombosed hemorrhoid was located  There was a very small enlarged external hemorrhoid with possible slight thrombosis in the right anterior position  Digital rectal exam did not show any significant findings, internal examination showed in addition to the above findings a small perianal skin tag  The abscess was 1st addressed  The abscess was opened using electrocautery, no further drainage was noted  The abscess was found to tract towards the dentate line just deep to the level of the skin  This was unroofed and a fistulotomy was completed  This fistula was extra sphincteric  The small perianal skin tag was removed using electrocautery  The right anterior hemorrhoid group was grasped using an Allis clamp  Incision was made at the base of the hemorrhoid using electrocautery  The hemorrhoid was then removed using the Harmonic focus  Hemostasis was achieved  The anal canal was packed with a Gelfoam coated in lidocaine ointment  The patient tolerated the procedure well transferred to recovery stable condition  A clean dressing was placed  At the end the procedure all needle instrument sponge counts were correct x2     I was present for the entire procedure and A physician assistant was required during the procedure for retraction tissue handling,dissection and suturing    Patient Disposition:  PACU       SIGNATURE: Edel Ugarte DO  DATE: July 8, 2022  TIME: 2:40 PM

## 2022-07-08 NOTE — ANESTHESIA PREPROCEDURE EVALUATION
Procedure:  EXCISION OF THROMBOSED EXTERNAL HEMORRHOID (N/A Penis)    Relevant Problems   CARDIO   (+) Migraine      GI/HEPATIC   (+) Chronic pancreatitis (HCC)   (+) GERD (gastroesophageal reflux disease)      HEMATOLOGY   (+) Anemia      NEURO/PSYCH   (+) History of anxiety   (+) Migraine   (+) Paresthesia of both lower extremities   (+) Paresthesias   (+) Psychogenic nonepileptic spells      PULMONARY   (+) WAGNER (obstructive sleep apnea)      Digestive   (+) Other intestinal malabsorption      Endocrine   (+) Right ovarian cyst      Other   (+) History of gastric bypass   (+) Hypocalcemia   (+) Hypokalemia   (+) Hypophosphatemia   (+) Vertigo   (+) Vitamin B12 deficiency   (+) Vitamin D insufficiency        Physical Exam    Airway    Mallampati score: I  TM Distance: >3 FB  Neck ROM: full     Dental       Cardiovascular  Cardiovascular exam normal    Pulmonary  Pulmonary exam normal     Other Findings        Anesthesia Plan  ASA Score- 3     Anesthesia Type- general with ASA Monitors  Additional Monitors:   Airway Plan: ETT and LMA  Plan Factors-Exercise tolerance (METS): >4 METS  Chart reviewed  EKG reviewed  Imaging results reviewed  Existing labs reviewed  Patient summary reviewed  Induction- intravenous  Postoperative Plan- Plan for postoperative opioid use  Planned trial extubation    Informed Consent- Anesthetic plan and risks discussed with patient  I personally reviewed this patient with the CRNA  Discussed and agreed on the Anesthesia Plan with the CRNA  Faisal Salazar

## 2022-07-08 NOTE — DISCHARGE INSTRUCTIONS
Hemorrhoidectomy   WHAT YOU SHOULD KNOW:   A hemorrhoidectomy is surgery to remove hemorrhoids  Hemorrhoids are swollen blood vessels inside your rectum or on your anus  Please call Dr Shi Cordero office with any questions or concerns at [phone]    INSTRUCTIONS:   Medicines:   Topical medicine:  A anesthetic gel has been prescribed for your use as needed  Pain medicine: Take prescribed medication as directed  You may also take tylenol as needed  Stool softeners: This medicine makes it easier for you to have a bowel movement  You may need this medicine to treat or prevent constipation  Use colace or milk of magnesia      Follow up with your healthcare provider as directed:  Write down your questions so you remember to ask them during your visits  Self-care: You may remove your dressing tomorrow or as needed  There is packing within the rectum  This will pass with your first bowel movement or desolve  Warm sitz bath:  Heat may help to decrease pain  Use a sitz bath  A sitz bath is a pan that fits on the toilet bowl and has warm water in it  Ask how often to use a sitz bath  Contact your healthcare provider if:   You have nausea or are vomiting  You have a fever  It is hard to urinate or have a bowel movement  You have pain when you urinate or have a bowel movement  You have questions or concerns about your condition or care  You are filling toilet bowls with blood, some bleeding is to be expected  Return to the emergency department if:       You cannot urinate, or you urinate very little  Your arm or leg feels warm, tender, and painful  It may look swollen and red  You suddenly feel lightheaded and short of breath  You have chest pain when you take a deep breath or cough  You cough up blood  © 2014 [address] is for End User's use only and may not be sold, redistributed or otherwise used for commercial purposes   All illustrations and images included in CareNotes® are the copyrighted property of A D A M , Inc  or Iftikhar Rogers  The above information is an  only  It is not intended as medical advice for individual conditions or treatments  Talk to your doctor, nurse or pharmacist before following any medical regimen to see if it is safe and effective for you

## 2022-07-08 NOTE — PROGRESS NOTES
On preoperative BMP, the patient's potassium was normal however, her creatinine was found to be significantly elevated from her baseline at 2 52  The patient was given 2 L crystalloid perioperatively  Another BMP was obtained prior to discharge which showed improvement of the creatinine to 2 17  I discussed these findings with the patient's nephrologist Dr Tessie Manley  There is no need for admission for hydration at this time  Encourage the patient to take adequate p o  Intake over the weekend  She is scheduled to have a weekly BMP completed on Monday    These results will be followed by Dr Tessie Manley

## 2022-07-08 NOTE — ANESTHESIA POSTPROCEDURE EVALUATION
Post-Op Assessment Note    CV Status:  Stable  Pain Score: 0    Pain management: adequate     Mental Status:  Awake   Hydration Status:  Euvolemic   PONV Controlled:  Controlled   Airway Patency:  Patent   Two or more mitigation strategies used for obstructive sleep apnea   Post Op Vitals Reviewed: Yes      Staff: CRNA         No complications documented      BP   112/54   Temp   97 8   Pulse  105   Resp   18   SpO2   100

## 2022-07-21 ENCOUNTER — TELEPHONE (OUTPATIENT)
Dept: NEPHROLOGY | Facility: CLINIC | Age: 37
End: 2022-07-21

## 2022-07-21 NOTE — TELEPHONE ENCOUNTER
Velma Nearing the pharmacist from [facility] called stating that patient called him with her potassium of 3 1  He stated that he is going to track down her most recent labs cause we don't have them either  He wanted to know if you want send 2 more bags to her?

## 2022-07-28 ENCOUNTER — TELEPHONE (OUTPATIENT)
Dept: NEPHROLOGY | Facility: CLINIC | Age: 37
End: 2022-07-28

## 2022-07-28 NOTE — TELEPHONE ENCOUNTER
Ajit Islas the infusions nurse called to let you know patient is going to go a week without a dressing change  Patient is aware to go to the Er if any issues

## 2022-08-01 ENCOUNTER — TELEPHONE (OUTPATIENT)
Dept: NEPHROLOGY | Facility: CLINIC | Age: 37
End: 2022-08-01

## 2022-08-01 ENCOUNTER — APPOINTMENT (OUTPATIENT)
Dept: LAB | Facility: HOSPITAL | Age: 37
End: 2022-08-01
Attending: INTERNAL MEDICINE
Payer: COMMERCIAL

## 2022-08-08 ENCOUNTER — APPOINTMENT (OUTPATIENT)
Dept: LAB | Facility: HOSPITAL | Age: 37
End: 2022-08-08
Attending: INTERNAL MEDICINE
Payer: COMMERCIAL

## 2022-08-16 ENCOUNTER — APPOINTMENT (OUTPATIENT)
Dept: LAB | Facility: HOSPITAL | Age: 37
End: 2022-08-16
Attending: INTERNAL MEDICINE
Payer: COMMERCIAL

## 2022-08-22 ENCOUNTER — TELEPHONE (OUTPATIENT)
Dept: NEPHROLOGY | Facility: CLINIC | Age: 37
End: 2022-08-22

## 2022-08-22 DIAGNOSIS — E87.6 HYPOKALEMIA: Primary | ICD-10-CM

## 2022-08-22 NOTE — TELEPHONE ENCOUNTER
Patient called stating she went for labs this morning and there was no active labs in for her  Would you like standing orders for patient

## 2022-08-23 ENCOUNTER — APPOINTMENT (OUTPATIENT)
Dept: LAB | Facility: HOSPITAL | Age: 37
End: 2022-08-23
Attending: INTERNAL MEDICINE
Payer: COMMERCIAL

## 2022-08-23 LAB
ANION GAP SERPL CALCULATED.3IONS-SCNC: 8 MMOL/L (ref 4–13)
BUN SERPL-MCNC: 12 MG/DL (ref 5–25)
CALCIUM SERPL-MCNC: 8.1 MG/DL (ref 8.3–10.1)
CHLORIDE SERPL-SCNC: 105 MMOL/L (ref 96–108)
CO2 SERPL-SCNC: 23 MMOL/L (ref 21–32)
CREAT SERPL-MCNC: 1.07 MG/DL (ref 0.6–1.3)
GFR SERPL CREATININE-BSD FRML MDRD: 66 ML/MIN/1.73SQ M
GLUCOSE SERPL-MCNC: 92 MG/DL (ref 65–140)
POTASSIUM SERPL-SCNC: 3.7 MMOL/L (ref 3.5–5.3)
SODIUM SERPL-SCNC: 136 MMOL/L (ref 135–147)

## 2022-08-23 PROCEDURE — 36415 COLL VENOUS BLD VENIPUNCTURE: CPT | Performed by: INTERNAL MEDICINE

## 2022-08-23 PROCEDURE — 80048 BASIC METABOLIC PNL TOTAL CA: CPT | Performed by: INTERNAL MEDICINE

## 2022-08-29 ENCOUNTER — TELEPHONE (OUTPATIENT)
Dept: NEPHROLOGY | Facility: CLINIC | Age: 37
End: 2022-08-29

## 2022-08-29 DIAGNOSIS — E87.6 HYPOKALEMIA: Primary | ICD-10-CM

## 2022-08-29 NOTE — TELEPHONE ENCOUNTER
Pt went to get blood work done and the order   She is suppose to get it done every week  Pt needs new order for BMP  If Pt still needs to have this done can a new be placed and a call to Pt when ready

## 2022-09-01 ENCOUNTER — APPOINTMENT (INPATIENT)
Dept: CT IMAGING | Facility: HOSPITAL | Age: 37
DRG: 282 | End: 2022-09-01
Payer: COMMERCIAL

## 2022-09-01 ENCOUNTER — HOSPITAL ENCOUNTER (INPATIENT)
Facility: HOSPITAL | Age: 37
LOS: 2 days | Discharge: HOME | DRG: 282 | End: 2022-09-03
Attending: EMERGENCY MEDICINE | Admitting: INTERNAL MEDICINE
Payer: COMMERCIAL

## 2022-09-01 ENCOUNTER — APPOINTMENT (OUTPATIENT)
Dept: RADIOLOGY | Facility: HOSPITAL | Age: 37
DRG: 282 | End: 2022-09-01
Payer: COMMERCIAL

## 2022-09-01 DIAGNOSIS — R11.2 NAUSEA VOMITING AND DIARRHEA: ICD-10-CM

## 2022-09-01 DIAGNOSIS — R19.7 NAUSEA VOMITING AND DIARRHEA: ICD-10-CM

## 2022-09-01 DIAGNOSIS — R10.32 LEFT LOWER QUADRANT ABDOMINAL PAIN: ICD-10-CM

## 2022-09-01 DIAGNOSIS — K85.90 ACUTE PANCREATITIS: Primary | ICD-10-CM

## 2022-09-01 LAB
ALBUMIN SERPL BCP-MCNC: 3.2 G/DL (ref 3.5–5)
ALP SERPL-CCNC: 41 U/L (ref 46–116)
ALT SERPL W P-5'-P-CCNC: 28 U/L (ref 12–78)
ANION GAP SERPL CALCULATED.3IONS-SCNC: 9 MMOL/L (ref 4–13)
AST SERPL W P-5'-P-CCNC: 22 U/L (ref 5–45)
BACTERIA UR QL AUTO: NORMAL /HPF
BASOPHILS # BLD AUTO: 0.04 THOUSANDS/ΜL (ref 0–0.1)
BASOPHILS NFR BLD AUTO: 1 % (ref 0–1)
BILIRUB SERPL-MCNC: 0.32 MG/DL (ref 0.2–1)
BILIRUB UR QL STRIP: NEGATIVE
BUN SERPL-MCNC: 12 MG/DL (ref 5–25)
CALCIUM ALBUM COR SERPL-MCNC: 8.7 MG/DL (ref 8.3–10.1)
CALCIUM SERPL-MCNC: 8.1 MG/DL (ref 8.3–10.1)
CHLORIDE SERPL-SCNC: 106 MMOL/L (ref 96–108)
CLARITY UR: CLEAR
CO2 SERPL-SCNC: 22 MMOL/L (ref 21–32)
COLOR UR: YELLOW
CREAT SERPL-MCNC: 1.25 MG/DL (ref 0.6–1.3)
EOSINOPHIL # BLD AUTO: 0.08 THOUSAND/ΜL (ref 0–0.61)
EOSINOPHIL NFR BLD AUTO: 1 % (ref 0–6)
ERYTHROCYTE [DISTWIDTH] IN BLOOD BY AUTOMATED COUNT: 16.7 % (ref 11.6–15.1)
FINE GRAN CASTS URNS QL MICRO: NORMAL /LPF
GFR SERPL CREATININE-BSD FRML MDRD: 54 ML/MIN/1.73SQ M
GLUCOSE SERPL-MCNC: 64 MG/DL (ref 65–140)
GLUCOSE UR STRIP-MCNC: NEGATIVE MG/DL
HCT VFR BLD AUTO: 37.3 % (ref 34.8–46.1)
HGB BLD-MCNC: 11.8 G/DL (ref 11.5–15.4)
HGB UR QL STRIP.AUTO: ABNORMAL
IMM GRANULOCYTES # BLD AUTO: 0.02 THOUSAND/UL (ref 0–0.2)
IMM GRANULOCYTES NFR BLD AUTO: 0 % (ref 0–2)
KETONES UR STRIP-MCNC: NEGATIVE MG/DL
LACTATE SERPL-SCNC: 0.6 MMOL/L (ref 0.5–2)
LEUKOCYTE ESTERASE UR QL STRIP: NEGATIVE
LIPASE SERPL-CCNC: 3021 U/L (ref 73–393)
LYMPHOCYTES # BLD AUTO: 1.6 THOUSANDS/ΜL (ref 0.6–4.47)
LYMPHOCYTES NFR BLD AUTO: 27 % (ref 14–44)
MCH RBC QN AUTO: 28.6 PG (ref 26.8–34.3)
MCHC RBC AUTO-ENTMCNC: 31.6 G/DL (ref 31.4–37.4)
MCV RBC AUTO: 91 FL (ref 82–98)
MONOCYTES # BLD AUTO: 0.45 THOUSAND/ΜL (ref 0.17–1.22)
MONOCYTES NFR BLD AUTO: 8 % (ref 4–12)
NEUTROPHILS # BLD AUTO: 3.71 THOUSANDS/ΜL (ref 1.85–7.62)
NEUTS SEG NFR BLD AUTO: 63 % (ref 43–75)
NITRITE UR QL STRIP: NEGATIVE
NON-SQ EPI CELLS URNS QL MICRO: NORMAL /HPF
NRBC BLD AUTO-RTO: 0 /100 WBCS
PH UR STRIP.AUTO: 5.5 [PH]
PLATELET # BLD AUTO: 286 THOUSANDS/UL (ref 149–390)
PMV BLD AUTO: 9.2 FL (ref 8.9–12.7)
POTASSIUM SERPL-SCNC: 4.1 MMOL/L (ref 3.5–5.3)
PROT SERPL-MCNC: 6.6 G/DL (ref 6.4–8.4)
PROT UR STRIP-MCNC: ABNORMAL MG/DL
RBC # BLD AUTO: 4.12 MILLION/UL (ref 3.81–5.12)
RBC #/AREA URNS AUTO: NORMAL /HPF
SODIUM SERPL-SCNC: 137 MMOL/L (ref 135–147)
SP GR UR STRIP.AUTO: >=1.03 (ref 1–1.03)
TRIGL SERPL-MCNC: 72 MG/DL
UROBILINOGEN UR QL STRIP.AUTO: 0.2 E.U./DL
WBC # BLD AUTO: 5.9 THOUSAND/UL (ref 4.31–10.16)
WBC #/AREA URNS AUTO: NORMAL /HPF

## 2022-09-01 PROCEDURE — 74177 CT ABD & PELVIS W/CONTRAST: CPT

## 2022-09-01 PROCEDURE — 73070 X-RAY EXAM OF ELBOW: CPT

## 2022-09-01 PROCEDURE — 81001 URINALYSIS AUTO W/SCOPE: CPT | Performed by: EMERGENCY MEDICINE

## 2022-09-01 PROCEDURE — 99285 EMERGENCY DEPT VISIT HI MDM: CPT | Performed by: EMERGENCY MEDICINE

## 2022-09-01 PROCEDURE — 85025 COMPLETE CBC W/AUTO DIFF WBC: CPT | Performed by: EMERGENCY MEDICINE

## 2022-09-01 PROCEDURE — 99223 1ST HOSP IP/OBS HIGH 75: CPT | Performed by: INTERNAL MEDICINE

## 2022-09-01 PROCEDURE — 96365 THER/PROPH/DIAG IV INF INIT: CPT

## 2022-09-01 PROCEDURE — 83690 ASSAY OF LIPASE: CPT | Performed by: EMERGENCY MEDICINE

## 2022-09-01 PROCEDURE — G1004 CDSM NDSC: HCPCS

## 2022-09-01 PROCEDURE — 36415 COLL VENOUS BLD VENIPUNCTURE: CPT | Performed by: EMERGENCY MEDICINE

## 2022-09-01 PROCEDURE — 3E03317 INTRODUCTION OF OTHER THROMBOLYTIC INTO PERIPHERAL VEIN, PERCUTANEOUS APPROACH: ICD-10-PCS | Performed by: INTERNAL MEDICINE

## 2022-09-01 PROCEDURE — 99285 EMERGENCY DEPT VISIT HI MDM: CPT

## 2022-09-01 PROCEDURE — 96375 TX/PRO/DX INJ NEW DRUG ADDON: CPT

## 2022-09-01 PROCEDURE — 83605 ASSAY OF LACTIC ACID: CPT | Performed by: INTERNAL MEDICINE

## 2022-09-01 PROCEDURE — 84478 ASSAY OF TRIGLYCERIDES: CPT | Performed by: INTERNAL MEDICINE

## 2022-09-01 PROCEDURE — 80053 COMPREHEN METABOLIC PANEL: CPT | Performed by: EMERGENCY MEDICINE

## 2022-09-01 PROCEDURE — C9113 INJ PANTOPRAZOLE SODIUM, VIA: HCPCS | Performed by: INTERNAL MEDICINE

## 2022-09-01 RX ORDER — DEXTROSE AND SODIUM CHLORIDE 5; .9 G/100ML; G/100ML
125 INJECTION, SOLUTION INTRAVENOUS CONTINUOUS
Status: DISCONTINUED | OUTPATIENT
Start: 2022-09-01 | End: 2022-09-01

## 2022-09-01 RX ORDER — AMILORIDE HYDROCHLORIDE 5 MG/1
5 TABLET ORAL DAILY
Status: DISCONTINUED | OUTPATIENT
Start: 2022-09-02 | End: 2022-09-03 | Stop reason: HOSPADM

## 2022-09-01 RX ORDER — B-COMPLEX WITH VITAMIN C
TABLET ORAL DAILY
Status: DISCONTINUED | OUTPATIENT
Start: 2022-09-01 | End: 2022-09-01 | Stop reason: RX

## 2022-09-01 RX ORDER — MORPHINE SULFATE 4 MG/ML
4 INJECTION, SOLUTION INTRAMUSCULAR; INTRAVENOUS ONCE
Status: COMPLETED | OUTPATIENT
Start: 2022-09-01 | End: 2022-09-01

## 2022-09-01 RX ORDER — SODIUM CHLORIDE, SODIUM LACTATE, POTASSIUM CHLORIDE, CALCIUM CHLORIDE 600; 310; 30; 20 MG/100ML; MG/100ML; MG/100ML; MG/100ML
250 INJECTION, SOLUTION INTRAVENOUS CONTINUOUS
Status: DISCONTINUED | OUTPATIENT
Start: 2022-09-01 | End: 2022-09-03 | Stop reason: HOSPADM

## 2022-09-01 RX ORDER — DEXTROSE, SODIUM CHLORIDE, AND POTASSIUM CHLORIDE 5; .9; .15 G/100ML; G/100ML; G/100ML
100 INJECTION INTRAVENOUS CONTINUOUS
Status: DISCONTINUED | OUTPATIENT
Start: 2022-09-01 | End: 2022-09-01

## 2022-09-01 RX ORDER — ACETAMINOPHEN 325 MG/1
650 TABLET ORAL EVERY 6 HOURS PRN
Status: DISCONTINUED | OUTPATIENT
Start: 2022-09-01 | End: 2022-09-03 | Stop reason: HOSPADM

## 2022-09-01 RX ORDER — PANTOPRAZOLE SODIUM 40 MG/10ML
40 INJECTION, POWDER, LYOPHILIZED, FOR SOLUTION INTRAVENOUS
Status: DISCONTINUED | OUTPATIENT
Start: 2022-09-01 | End: 2022-09-03 | Stop reason: HOSPADM

## 2022-09-01 RX ORDER — ONDANSETRON 2 MG/ML
4 INJECTION INTRAMUSCULAR; INTRAVENOUS ONCE
Status: COMPLETED | OUTPATIENT
Start: 2022-09-01 | End: 2022-09-01

## 2022-09-01 RX ORDER — FENTANYL CITRATE 50 UG/ML
50 INJECTION, SOLUTION INTRAMUSCULAR; INTRAVENOUS ONCE
Status: COMPLETED | OUTPATIENT
Start: 2022-09-01 | End: 2022-09-01

## 2022-09-01 RX ORDER — ESCITALOPRAM OXALATE 10 MG/1
20 TABLET ORAL
Status: DISCONTINUED | OUTPATIENT
Start: 2022-09-01 | End: 2022-09-03 | Stop reason: HOSPADM

## 2022-09-01 RX ORDER — AMITRIPTYLINE HYDROCHLORIDE 10 MG/1
20 TABLET, FILM COATED ORAL
Status: DISCONTINUED | OUTPATIENT
Start: 2022-09-01 | End: 2022-09-03 | Stop reason: HOSPADM

## 2022-09-01 RX ORDER — ONDANSETRON 2 MG/ML
4 INJECTION INTRAMUSCULAR; INTRAVENOUS EVERY 6 HOURS PRN
Status: DISCONTINUED | OUTPATIENT
Start: 2022-09-01 | End: 2022-09-03 | Stop reason: HOSPADM

## 2022-09-01 RX ADMIN — AMITRIPTYLINE HYDROCHLORIDE 20 MG: 10 TABLET, FILM COATED ORAL at 21:02

## 2022-09-01 RX ADMIN — DEXTROSE, SODIUM CHLORIDE, AND POTASSIUM CHLORIDE 100 ML/HR: 5; .9; .15 INJECTION INTRAVENOUS at 10:44

## 2022-09-01 RX ADMIN — ALTEPLASE 2 MG: 2.2 INJECTION, POWDER, LYOPHILIZED, FOR SOLUTION INTRAVENOUS at 15:35

## 2022-09-01 RX ADMIN — MORPHINE SULFATE 2 MG: 2 INJECTION, SOLUTION INTRAMUSCULAR; INTRAVENOUS at 19:33

## 2022-09-01 RX ADMIN — ONDANSETRON 4 MG: 2 INJECTION INTRAMUSCULAR; INTRAVENOUS at 09:38

## 2022-09-01 RX ADMIN — FENTANYL CITRATE 50 MCG: 50 INJECTION INTRAMUSCULAR; INTRAVENOUS at 09:39

## 2022-09-01 RX ADMIN — MORPHINE SULFATE 4 MG: 4 INJECTION INTRAVENOUS at 11:01

## 2022-09-01 RX ADMIN — DEXTROSE AND SODIUM CHLORIDE 125 ML/HR: 5; .9 INJECTION, SOLUTION INTRAVENOUS at 15:35

## 2022-09-01 RX ADMIN — IOHEXOL 75 ML: 350 INJECTION, SOLUTION INTRAVENOUS at 15:16

## 2022-09-01 RX ADMIN — SODIUM CHLORIDE, SODIUM LACTATE, POTASSIUM CHLORIDE, AND CALCIUM CHLORIDE 250 ML/HR: .6; .31; .03; .02 INJECTION, SOLUTION INTRAVENOUS at 21:49

## 2022-09-01 RX ADMIN — MORPHINE SULFATE 2 MG: 2 INJECTION, SOLUTION INTRAMUSCULAR; INTRAVENOUS at 14:49

## 2022-09-01 RX ADMIN — ESCITALOPRAM OXALATE 20 MG: 10 TABLET ORAL at 21:02

## 2022-09-01 RX ADMIN — THIAMINE HYDROCHLORIDE 100 MG: 100 INJECTION, SOLUTION INTRAMUSCULAR; INTRAVENOUS at 18:32

## 2022-09-01 RX ADMIN — MORPHINE SULFATE 2 MG: 2 INJECTION, SOLUTION INTRAMUSCULAR; INTRAVENOUS at 23:41

## 2022-09-01 RX ADMIN — SODIUM CHLORIDE, SODIUM LACTATE, POTASSIUM CHLORIDE, AND CALCIUM CHLORIDE 250 ML/HR: .6; .31; .03; .02 INJECTION, SOLUTION INTRAVENOUS at 17:29

## 2022-09-01 RX ADMIN — DEXTROSE AND SODIUM CHLORIDE 125 ML/HR: 5; .9 INJECTION, SOLUTION INTRAVENOUS at 14:49

## 2022-09-01 RX ADMIN — PANTOPRAZOLE SODIUM 40 MG: 40 INJECTION, POWDER, FOR SOLUTION INTRAVENOUS at 14:50

## 2022-09-01 RX ADMIN — ONDANSETRON 4 MG: 2 INJECTION INTRAMUSCULAR; INTRAVENOUS at 21:02

## 2022-09-01 NOTE — H&P
[address] [facility]  H&P- Arlene Imam 1985, 44 y o  female MRN: [id number]  Unit/Bed#: -01 Encounter: [id number]  Primary Care Provider: Raj Andre MD   Date and time admitted to hospital: 9/1/2022  8:46 AM    * Acute pancreatitis  Assessment & Plan  Patient presents with 1 day history of epigastric and left upper quadrant abdominal pain  Associated with nausea and nonbilious vomiting  Lipase elevated at 3021  CT abdomen  Pending  Patient has history of cholecystectomy  Denies any alcohol use  Currently not on any medications which can cause pancreatitis  She does report prior history of bouts pancreatitis approximately 1 year ago and was maintained on Creon however has been off it for over 2 months  Nausea vomiting and diarrhea  Assessment & Plan  Patient reports 2 months history of nausea vomiting and diarrhea  Reports intermittent nonbilious vomiting and persistent nausea  Also reports up to 8-10 liquid bowel movements daily  She saw her bariatric surgeon approximately 2 weeks ago and was referred to see a gastroenterologist   Has prior history of C diff in 2015  Will order stool C diff toxin, ova parasites and enteric pathogen panel  P r n  Antiemetics  Continue with IV fluids  Unclear if persistent diarrhea is secondary to chronic pancreatitis versus other pathology  Will have Gastroenterology evaluate    Hypokalemia  Assessment & Plan  Patient has history of chronic hypokalemia for which she follows up with Nephrology   She has a port in place and gets daily infusion of 80 mEq of IV potassium  Will continue during hospitalization  Follow-up BMP daily      VTE Pharmacologic Prophylaxis:   High Risk (Score >/= 5) - Pharmacological DVT Prophylaxis Ordered: enoxaparin (Lovenox)  Sequential Compression Devices Ordered  Code Status: Full code   Discussion with family: Patient declined call to        Anticipated Length of Stay: Patient will be admitted on an inpatient basis with an anticipated length of stay of greater than 2 midnights secondary to Ongoing management of pancreatitis  Total Time for Visit, including Counseling / Coordination of Care: 70 minutes Greater than 50% of this total time spent on direct patient counseling and coordination of care  Chief Complaint:  Abdominal pain    History of Present Illness:  Ean Hernandez is a 44 y o  female with a PMH of gastric bypass surgery in 2014, chronic pancreatitis, C diff colitis and 2015, hypokalemia requiring IV potassium administration daily, who presents with 1 day history of abdominal pain  Patient states the pain is in the left upper quadrant sharp and stabbing in nature nonradiating moderate to severe intensity associated with nausea and vomiting  She did also complain of dysuria but denies any urinary frequency urgency fever or chills  Patient reports that over the last 2 months she has been having persistent nausea vomiting and diarrhea  She reports multiple episode of loose watery bowel movement which are nonbloody  She recently saw her bariatric surgeon and was referred to see a gastroenterologist   She attributed some of her chronic symptoms 2 possible mold in the air conditioning of her house which has since then been replaced  Review of Systems:  Review of Systems   Constitutional: Positive for activity change and appetite change  HENT: Negative  Eyes: Negative  Respiratory: Negative  Cardiovascular: Negative  Gastrointestinal: Positive for abdominal pain, diarrhea, nausea and vomiting  Endocrine: Negative  Genitourinary: Positive for dysuria  Musculoskeletal: Negative  Skin: Negative  Allergic/Immunologic: Negative  Neurological: Negative  Hematological: Negative  Psychiatric/Behavioral: The patient is nervous/anxious          Past Medical and Surgical History:   Past Medical History:   Diagnosis Date    C  difficile colitis     COVID-19     1/2022- pt denies long covid    DUB (dysfunctional uterine bleeding)     H  pylori infection     Hypertension     Hypokalemia     Left lower quadrant abdominal pain 2020    Migraine     Psychiatric disorder     Anxiety    Rectal bleeding     Renal disorder     Rhabdomyolysis     Thrombosed external hemorrhoid        Past Surgical History:   Procedure Laterality Date    ABDOMINAL SURGERY      APPENDECTOMY       SECTION      CHOLECYSTECTOMY      COSMETIC SURGERY      "tummy tuck"    FL GUIDED CENTRAL VENOUS ACCESS DEVICE INSERTION  2018    GASTRIC BYPASS      HYSTERECTOMY      LAPAROSCOPY      ID EXCISION THROMBOSED HEMORRHOID, EXTERNAL N/A 2022    Procedure: EXCISION OF THROMBOSED EXTERNAL HEMORRHOID, Excision of perianal skin tag, dranage of perianal abscess, anal fistulatomy;  Surgeon: Romy Walker DO;  Location: [facility];  Service: General    ID SURG DIAGNOSTIC EXAM, ANORECTAL N/A 2021    Procedure: ANAL EXAM UNDER ANESTHESIA; HEMORRHOIDECTOMY;  Surgeon: Romy Walker DO;  Location:  MAIN OR;  Service: General    TUNNELED VENOUS PORT PLACEMENT N/A 2018    Procedure: INSERTION VENOUS PORT (PORT-A-CATH); Surgeon: Dania Ortiz DO;  Location: [facility];  Service: General       Meds/Allergies:  Prior to Admission medications    Medication Sig Start Date End Date Taking?  Authorizing Provider   AMILoride 5 mg tablet Take 1 tablet (5 mg total) by mouth daily 19  Yes Julio Carr DO   amitriptyline (ELAVIL) 10 mg tablet Take 2 tablets (20 mg total) by mouth daily at bedtime 22  Yes Handy Bhatia DO   B Complex Vitamins (VITAMIN B COMPLEX PO) Take 1 capsule by mouth daily     Yes Historical Provider, MD   escitalopram (LEXAPRO) 10 mg tablet Take 20 mg by mouth daily at bedtime    Yes Historical Provider, MD   calcium carbonate (OYSTER SHELL,OSCAL) 500 mg Take 1 tablet by mouth 3 (three) times a day with meals 22 Lida Zarco MD   Catheters MISC by Does not apply route 2 (two) times a week Port care per [place] protocol  Patient not taking: Reported on 1/4/2022 5/7/20   Eboni Chavez DO   cyanocobalamin 1,000 mcg/mL Inject 1 mL (1,000 mcg total) into a muscle every 30 (thirty) days  Patient taking differently: Inject 1,000 mcg into a muscle every 30 (thirty) days 9/6 Tuesday next dose 5/4/20   Eboni Chavez DO   ergocalciferol (ERGOCALCIFEROL) 1 25 MG (21328 UT) capsule Take 1 capsule (50,000 Units total) by mouth once a week Mondays and Thursdays  Patient taking differently: Take 50,000 Units by mouth 2 (two) times a day Mondays and Thursdays 9/17/21   Eboni Chavez DO   ferrous sulfate 325 (65 Fe) mg tablet Take 325 mg by mouth every other day Last took 3/9/20     Historical Provider, MD   lidocaine (Lidoderm) 5 % Apply 1 patch topically daily for 7 days Remove & Discard patch within 12 hours or as directed by MD  Patient not taking: Reported on 7/7/2022 11/20/21 11/27/21  Lazarus Christine, MD   methocarbamol (ROBAXIN) 500 mg tablet Take 1 tablet (500 mg total) by mouth 2 (two) times a day  Patient not taking: Reported on 7/7/2022 11/20/21   Lazarus Christine, MD   ondansetron (ZOFRAN) 4 mg tablet Take 1 tablet (4 mg total) by mouth every 8 (eight) hours as needed for nausea or vomiting 3/6/22   Juliet Jiménez MD   oxyCODONE (Roxicodone) 5 immediate release tablet Take 1 tablet (5 mg total) by mouth every 4 (four) hours as needed for severe pain for up to 10 days Max Daily Amount: 30 mg 7/8/22 7/18/22  Roya Pham,    oxyCODONE (Roxicodone) 5 mg immediate release tablet Take 1 tablet (5 mg total) by mouth every 4 (four) hours as needed for severe pain for up to 10 daysMax Daily Amount: 30 mg 9/23/21 10/3/21  Lonne Line, DO   pancrelipase, Lip-Prot-Amyl, (CREON) 24,000 units Take by mouth see administration instructions 3 capsules TID W meal and 1 capsules with Snack   Patient not taking: Reported on 7/7/2022    Historical Provider, MD   potassium chloride 0 4 mEq/mL Infuse 200 mL (80 mEq total) into a venous catheter daily 80 meq in 1 litre bag to be infused daily over 8 hours  Patient taking differently: Inject 80 mEq into a catheter in a vein daily 80 meq in 1 litre bag to be infused daily over 8 hours prefers 9 pm HS 5/11/21   Kelsi Ramirez DO   Syringe/Needle, Disp, (SYRINGE 3CC/25GX5/8") 25G X 5/8" 3 ML MISC Use once monthly for B12 injection  6/1/20   Kelsi Ramirez DO     I have reviewed home medications with patient personally  Allergies: Allergies   Allergen Reactions    Nsaids Other (See Comments)     Other reaction(s): Other (Please comment)  Should not take s/p bariatric surgery  Other reaction(s): Other (See Comments)  Should not take as per prior records  Should not take s/p bariatric surgery  Has hx of gastric bypass      Prednisone      Nausea, vomiting, diarrhea       Social History:  Marital Status: /Civil Union   Substance Use History:   Social History     Substance and Sexual Activity   Alcohol Use Never    Alcohol/week: 0 0 standard drinks    Comment: 0     Social History     Tobacco Use   Smoking Status Never Smoker   Smokeless Tobacco Never Used     Social History     Substance and Sexual Activity   Drug Use Never       Family History:  Family History   Problem Relation Age of Onset    No Known Problems Mother     Hypertension Father     Diabetes Father     Diabetes Maternal Grandfather        Physical Exam:     Vitals:   Blood Pressure: 122/67 (09/01/22 1222)  Pulse: 71 (09/01/22 1222)  Temperature: 98 1 °F (36 7 °C) (09/01/22 1222)  Temp Source: Temporal (09/01/22 0851)  Respirations: 18 (09/01/22 1222)  Height: 5' 3" (160 cm) (09/01/22 0851)  Weight - Scale: 55 kg (121 lb 4 1 oz) (09/01/22 0851)  SpO2: 98 % (09/01/22 1222)    Physical Exam  Constitutional:       General: She is in acute distress  HENT:      Head: Normocephalic  Nose: Nose normal       Mouth/Throat:      Mouth: Mucous membranes are moist    Eyes:      Extraocular Movements: Extraocular movements intact  Pupils: Pupils are equal, round, and reactive to light  Cardiovascular:      Rate and Rhythm: Normal rate and regular rhythm  Pulses: Normal pulses  Pulmonary:      Effort: Pulmonary effort is normal       Breath sounds: Normal breath sounds  Comments: Right chest wall Port-A-Cath in place without overlying erythema  Abdominal:      Tenderness: There is abdominal tenderness  Comments: Tenderness in the left upper quadrant without rebound or guarding  Bowel sounds audible  Abdomen soft and nondistended  Musculoskeletal:      Cervical back: Neck supple  Right lower leg: No edema  Left lower leg: No edema  Skin:     General: Skin is warm  Neurological:      General: No focal deficit present  Mental Status: She is alert and oriented to person, place, and time  Mental status is at baseline  Psychiatric:         Mood and Affect: Mood normal          Additional Data:     Lab Results:  Results from last 7 days   Lab Units 09/01/22  0925   WBC Thousand/uL 5 90   HEMOGLOBIN g/dL 11 8   HEMATOCRIT % 37 3   PLATELETS Thousands/uL 286   NEUTROS PCT % 63   LYMPHS PCT % 27   MONOS PCT % 8   EOS PCT % 1     Results from last 7 days   Lab Units 09/01/22  0925   SODIUM mmol/L 137   POTASSIUM mmol/L 4 1   CHLORIDE mmol/L 106   CO2 mmol/L 22   BUN mg/dL 12   CREATININE mg/dL 1 25   ANION GAP mmol/L 9   CALCIUM mg/dL 8 1*   ALBUMIN g/dL 3 2*   TOTAL BILIRUBIN mg/dL 0 32   ALK PHOS U/L 41*   ALT U/L 28   AST U/L 22   GLUCOSE RANDOM mg/dL 64*                       Imaging: No pertinent imaging reviewed  CT abdomen pelvis with contrast    (Results Pending)       EKG and Other Studies Reviewed on Admission:   · EKG: No EKG obtained      ** Please Note: This note has been constructed using a voice recognition system   **

## 2022-09-01 NOTE — ASSESSMENT & PLAN NOTE
Patient presents with 1 day history of epigastric and left upper quadrant abdominal pain  Associated with nausea and nonbilious vomiting  Lipase elevated at 3021  CT abdomen  Pending  Patient has history of cholecystectomy  Denies any alcohol use  Currently not on any medications which can cause pancreatitis  She does report prior history of bouts pancreatitis approximately 1 year ago and was maintained on Creon however has been off it for over 2 months

## 2022-09-01 NOTE — ASSESSMENT & PLAN NOTE
Patient has history of chronic hypokalemia for which she follows up with Nephrology   She has a port in place and gets daily infusion of 80 mEq of IV potassium  Will continue during hospitalization    Follow-up BMP daily

## 2022-09-01 NOTE — NURSING NOTE
Right arm ante cube site  Infiltrated  Notified cat scan on patient return(20 gauge  iv inserted prior to CT) Per Tiana  Of CT place cold compresses on iv site and monitor, Dr Marlene Pham to order x ray of arm per protocol   Arden Melvin RN verified infiltrate (2nd nurse)

## 2022-09-01 NOTE — ED PROVIDER NOTES
History  Chief Complaint   Patient presents with    Abdominal Pain     Pt reports left flank pain radiating into LLQ, pt reports they attempted to urinate but couldn't, also having nausea/vomiting/diarrhea       History provided by:  Medical records and patient  Abdominal Pain  Pain location:  L flank and LUQ  Pain quality: sharp and stabbing    Pain radiates to:  Does not radiate  Pain severity:  Moderate  Onset quality:  Sudden  Duration:  8 hours  Timing:  Constant  Progression:  Unchanged  Chronicity:  New  Context comment:  Patient had sudden onset of left flank pain starting around 1:00 a m  This morning, radiating to left upper quadrant associated with dysuria  Relieved by:  Nothing  Worsened by:  Nothing  Ineffective treatments:  None tried  Associated symptoms: dysuria    Associated symptoms: no chest pain, no chills, no cough, no diarrhea, no fatigue, no fever, no hematuria, no nausea, no shortness of breath, no sore throat and no vomiting        Prior to Admission Medications   Prescriptions Last Dose Informant Patient Reported? Taking? AMILoride 5 mg tablet 9/1/2022 at Unknown time Self No Yes   Sig: Take 1 tablet (5 mg total) by mouth daily   B Complex Vitamins (VITAMIN B COMPLEX PO) 9/1/2022 at Unknown time  Yes Yes   Sig: Take 1 capsule by mouth daily     Catheters MISC   No No   Sig: by Does not apply route 2 (two) times a week Port care per [place] protocol   Patient not taking: Reported on 1/4/2022    Syringe/Needle, Disp, (SYRINGE 3CC/25GX5/8") 25G X 5/8" 3 ML MISC   No No   Sig: Use once monthly for B12 injection     amitriptyline (ELAVIL) 10 mg tablet 8/31/2022 at Unknown time  No Yes   Sig: Take 2 tablets (20 mg total) by mouth daily at bedtime   calcium carbonate (OYSTER SHELL,OSCAL) 500 mg   No No   Sig: Take 1 tablet by mouth 3 (three) times a day with meals   cyanocobalamin 1,000 mcg/mL  Self No No   Sig: Inject 1 mL (1,000 mcg total) into a muscle every 30 (thirty) days   Patient taking differently: Inject 1,000 mcg into a muscle every 30 (thirty) days 9/6 Tuesday next dose   ergocalciferol (ERGOCALCIFEROL) 1 25 MG (78953 UT) capsule   No No   Sig: Take 1 capsule (50,000 Units total) by mouth once a week Mondays and Thursdays   Patient taking differently: Take 50,000 Units by mouth 2 (two) times a day Mondays and Thursdays   escitalopram (LEXAPRO) 10 mg tablet 8/31/2022 at Unknown time Self Yes Yes   Sig: Take 20 mg by mouth daily at bedtime    ferrous sulfate 325 (65 Fe) mg tablet   Yes No   Sig: Take 325 mg by mouth every other day Last took 3/9/20    lidocaine (Lidoderm) 5 %   No No   Sig: Apply 1 patch topically daily for 7 days Remove & Discard patch within 12 hours or as directed by MD   Patient not taking: Reported on 7/7/2022   methocarbamol (ROBAXIN) 500 mg tablet   No No   Sig: Take 1 tablet (500 mg total) by mouth 2 (two) times a day   Patient not taking: Reported on 7/7/2022   ondansetron (ZOFRAN) 4 mg tablet   No No   Sig: Take 1 tablet (4 mg total) by mouth every 8 (eight) hours as needed for nausea or vomiting   oxyCODONE (Roxicodone) 5 immediate release tablet   No No   Sig: Take 1 tablet (5 mg total) by mouth every 4 (four) hours as needed for severe pain for up to 10 days Max Daily Amount: 30 mg   oxyCODONE (Roxicodone) 5 mg immediate release tablet   No No   Sig: Take 1 tablet (5 mg total) by mouth every 4 (four) hours as needed for severe pain for up to 10 daysMax Daily Amount: 30 mg   pancrelipase, Lip-Prot-Amyl, (CREON) 24,000 units   Yes No   Sig: Take by mouth see administration instructions 3 capsules TID W meal and 1 capsules with Snack    Patient not taking: Reported on 7/7/2022   potassium chloride 0 4 mEq/mL  Self No No   Sig: Infuse 200 mL (80 mEq total) into a venous catheter daily 80 meq in 1 litre bag to be infused daily over 8 hours   Patient taking differently: Inject 80 mEq into a catheter in a vein daily 80 meq in 1 litre bag to be infused daily over 8 hours prefers 9 pm       Facility-Administered Medications: None       Past Medical History:   Diagnosis Date    C  difficile colitis     COVID-19     2022- pt denies long covid    DUB (dysfunctional uterine bleeding)     H  pylori infection     Hypertension     Hypokalemia     Left lower quadrant abdominal pain 2020    Migraine     Psychiatric disorder     Anxiety    Rectal bleeding     Renal disorder     Rhabdomyolysis     Thrombosed external hemorrhoid        Past Surgical History:   Procedure Laterality Date    ABDOMINAL SURGERY      APPENDECTOMY       SECTION      CHOLECYSTECTOMY      COSMETIC SURGERY      "tummy tuck"    FL GUIDED CENTRAL VENOUS ACCESS DEVICE INSERTION  2018    GASTRIC BYPASS      HYSTERECTOMY      LAPAROSCOPY      MD EXCISION THROMBOSED HEMORRHOID, EXTERNAL N/A 2022    Procedure: EXCISION OF THROMBOSED EXTERNAL HEMORRHOID, Excision of perianal skin tag, dranage of perianal abscess, anal fistulatomy;  Surgeon: Robert Rodríguez DO;  Location: [facility];  Service: General    MD SURG DIAGNOSTIC EXAM, ANORECTAL N/A 2021    Procedure: ANAL EXAM UNDER ANESTHESIA; HEMORRHOIDECTOMY;  Surgeon: Robert Rodríguez DO;  Location:  MAIN OR;  Service: General    TUNNELED VENOUS PORT PLACEMENT N/A 2018    Procedure: INSERTION VENOUS PORT (PORT-A-CATH); Surgeon: Claudia Lee DO;  Location: [facility];  Service: General       Family History   Problem Relation Age of Onset    No Known Problems Mother     Hypertension Father     Diabetes Father     Diabetes Maternal Grandfather      I have reviewed and agree with the history as documented      E-Cigarette/Vaping    E-Cigarette Use Never User      E-Cigarette/Vaping Substances     Social History     Tobacco Use    Smoking status: Never Smoker    Smokeless tobacco: Never Used   Vaping Use    Vaping Use: Never used   Substance Use Topics    Alcohol use: Never     Alcohol/week: 0 0 standard drinks     Comment: 0    Drug use: Never       Review of Systems   Constitutional: Negative for chills, fatigue and fever  HENT: Negative for ear discharge, ear pain, rhinorrhea and sore throat  Eyes: Negative for pain and visual disturbance  Respiratory: Negative for cough and shortness of breath  Cardiovascular: Negative for chest pain and palpitations  Gastrointestinal: Positive for abdominal pain  Negative for diarrhea, nausea and vomiting  Endocrine: Negative for polydipsia, polyphagia and polyuria  Genitourinary: Positive for dysuria and flank pain  Negative for difficulty urinating and hematuria  Musculoskeletal: Negative for arthralgias and back pain  Skin: Negative for color change and rash  Allergic/Immunologic: Negative for immunocompromised state  Neurological: Negative for dizziness, seizures, syncope, weakness and headaches  Psychiatric/Behavioral: Negative for confusion and self-injury  The patient is not nervous/anxious  All other systems reviewed and are negative  Physical Exam  Physical Exam  Vitals and nursing note reviewed  Constitutional:       General: She is not in acute distress  Appearance: Normal appearance  She is not ill-appearing, toxic-appearing or diaphoretic  HENT:      Head: Normocephalic and atraumatic  Nose: Nose normal  No congestion or rhinorrhea  Mouth/Throat:      Mouth: Mucous membranes are moist       Pharynx: Oropharynx is clear  No oropharyngeal exudate or posterior oropharyngeal erythema  Eyes:      General:         Right eye: No discharge  Left eye: No discharge  Cardiovascular:      Rate and Rhythm: Normal rate and regular rhythm  Pulses: Normal pulses  Heart sounds: Normal heart sounds  No murmur heard  No gallop  Comments: Port-A-Cath right chest wall accessed with Hep-Lock, site without evidence of infection, nontender    Pulmonary:      Effort: Pulmonary effort is normal  No respiratory distress  Breath sounds: Normal breath sounds  No stridor  No wheezing, rhonchi or rales  Chest:      Chest wall: No tenderness  Abdominal:      General: Bowel sounds are normal  There is no distension  Palpations: Abdomen is soft  There is no mass  Tenderness: There is abdominal tenderness in the left upper quadrant  There is left CVA tenderness  There is no right CVA tenderness, guarding or rebound  Hernia: No hernia is present  Musculoskeletal:         General: Normal range of motion  Cervical back: Normal range of motion and neck supple  Skin:     General: Skin is warm and dry  Capillary Refill: Capillary refill takes less than 2 seconds  Neurological:      General: No focal deficit present  Mental Status: She is alert and oriented to person, place, and time  Cranial Nerves: No cranial nerve deficit  Sensory: No sensory deficit  Motor: No weakness  Coordination: Coordination normal       Gait: Gait normal       Deep Tendon Reflexes: Reflexes normal    Psychiatric:         Mood and Affect: Mood normal          Behavior: Behavior normal          Thought Content:  Thought content normal          Judgment: Judgment normal          Vital Signs  ED Triage Vitals   Temperature Pulse Respirations Blood Pressure SpO2   09/01/22 0851 09/01/22 0851 09/01/22 0851 09/01/22 0851 09/01/22 0851   97 7 °F (36 5 °C) 86 20 116/58 100 %      Temp Source Heart Rate Source Patient Position - Orthostatic VS BP Location FiO2 (%)   09/01/22 0851 09/01/22 0851 09/01/22 0851 09/01/22 0851 --   Temporal Monitor Sitting Left arm       Pain Score       09/01/22 0939       9           Vitals:    09/01/22 0900 09/01/22 1030 09/01/22 1100 09/01/22 1222   BP: 116/58 125/57 115/52 122/67   Pulse: 79 86 79 71   Patient Position - Orthostatic VS: Sitting Lying Lying          Visual Acuity      ED Medications  Medications   AMILoride tablet 5 mg (has no administration in time range)   amitriptyline (ELAVIL) tablet 20 mg (has no administration in time range)   escitalopram (LEXAPRO) tablet 20 mg (has no administration in time range)   ondansetron (ZOFRAN) injection 4 mg (has no administration in time range)   acetaminophen (TYLENOL) tablet 650 mg (has no administration in time range)   morphine injection 2 mg (has no administration in time range)   morphine injection 2 mg (2 mg Intravenous Given 9/1/22 1449)   pantoprazole (PROTONIX) injection 40 mg (40 mg Intravenous Given 9/1/22 1450)   potassium chloride 80 mEq in sodium chloride 0 9 % 1,000 mL infusion (has no administration in time range)   dextrose 5 % and sodium chloride 0 9 % infusion (125 mL/hr Intravenous New Bag 9/1/22 1535)   fentanyl citrate (PF) 100 MCG/2ML 50 mcg (50 mcg Intravenous Given 9/1/22 0939)   ondansetron (ZOFRAN) injection 4 mg (4 mg Intravenous Given 9/1/22 0938)   morphine injection 4 mg (4 mg Intravenous Given 9/1/22 1101)   alteplase (CATHFLO) injection 2 mg (2 mg Intracatheter Given 9/1/22 1535)   iohexol (OMNIPAQUE) 350 MG/ML injection (MULTI-DOSE) 75 mL (75 mL Intravenous Given 9/1/22 1516)       Diagnostic Studies  Results Reviewed     Procedure Component Value Units Date/Time    Urine Microscopic [[id number]] Collected: 09/01/22 0906    Lab Status: Final result Specimen: Urine, Clean Catch Updated: 09/01/22 1007     RBC, UA 2-4 /hpf      WBC, UA 0-1 /hpf      Epithelial Cells Occasional /hpf      Bacteria, UA None Seen /hpf      Fine granular casts 5-10 /lpf     Lipase [[id number]]  (Abnormal) Collected: 09/01/22 0925    Lab Status: Final result Specimen: Blood from Arm, Left Updated: 09/01/22 0957     Lipase 3,021 u/L     Comprehensive metabolic panel [[id number]]  (Abnormal) Collected: 09/01/22 0925    Lab Status: Final result Specimen: Blood from Arm, Left Updated: 09/01/22 0948     Sodium 137 mmol/L      Potassium 4 1 mmol/L      Chloride 106 mmol/L      CO2 22 mmol/L      ANION GAP 9 mmol/L BUN 12 mg/dL      Creatinine 1 25 mg/dL      Glucose 64 mg/dL      Calcium 8 1 mg/dL      Corrected Calcium 8 7 mg/dL      AST 22 U/L      ALT 28 U/L      Alkaline Phosphatase 41 U/L      Total Protein 6 6 g/dL      Albumin 3 2 g/dL      Total Bilirubin 0 32 mg/dL      eGFR 54 ml/min/1 73sq m     Narrative:      National Kid[employer] Disease Foundation guidelines for Chronic Kidney Disease (CKD):     Stage 1 with normal or high GFR (GFR > 90 mL/min/1 73 square meters)    Stage 2 Mild CKD (GFR = 60-89 mL/min/1 73 square meters)    Stage 3A Moderate CKD (GFR = 45-59 mL/min/1 73 square meters)    Stage 3B Moderate CKD (GFR = 30-44 mL/min/1 73 square meters)    Stage 4 Severe CKD (GFR = 15-29 mL/min/1 73 square meters)    Stage 5 End Stage CKD (GFR <15 mL/min/1 73 square meters)  Note: GFR calculation is accurate only with a steady state creatinine    UA w Reflex to Microscopic w Reflex to Culture [[id number]]  (Abnormal) Collected: 09/01/22 0906    Lab Status: Final result Specimen: Urine, Clean Catch Updated: 09/01/22 0930     Color, UA Yellow     Clarity, UA Clear     Specific Gravity, UA >=1 030     pH, UA 5 5     Leukocytes, UA Negative     Nitrite, UA Negative     Protein, UA 30 (1+) mg/dl      Glucose, UA Negative mg/dl      Ketones, UA Negative mg/dl      Urobilinogen, UA 0 2 E U /dl      Bilirubin, UA Negative     Occult Blood, UA Small    CBC and differential [[id number]]  (Abnormal) Collected: 09/01/22 0925    Lab Status: Final result Specimen: Blood from Arm, Left Updated: 09/01/22 0930     WBC 5 90 Thousand/uL      RBC 4 12 Million/uL      Hemoglobin 11 8 g/dL      Hematocrit 37 3 %      MCV 91 fL      MCH 28 6 pg      MCHC 31 6 g/dL      RDW 16 7 %      MPV 9 2 fL      Platelets 081 Thousands/uL      nRBC 0 /100 WBCs      Neutrophils Relative 63 %      Immat GRANS % 0 %      Lymphocytes Relative 27 %      Monocytes Relative 8 %      Eosinophils Relative 1 %      Basophils Relative 1 %      Neutrophils Absolute 3 71 Thousands/µL      Immature Grans Absolute 0 02 Thousand/uL      Lymphocytes Absolute 1 60 Thousands/µL      Monocytes Absolute 0 45 Thousand/µL      Eosinophils Absolute 0 08 Thousand/µL      Basophils Absolute 0 04 Thousands/µL                  CT abdomen pelvis with contrast   Final Result by Óscar Agrawal DO (09/01 1538)      No CT findings for pancreatitis  Short segment small bowel - small bowel intussusception in the left lower quadrant, likely transient  Much of the colon is fluid-filled, some segments are prominent in caliber and there is suggestion of rectal wall thickening, which may be secondary to colitis  Additional findings as above  The study was marked in EPIC for significant notification  Workstation performed: [id number]                    Procedures  Procedures         ED Course  ED Course as of 09/01/22 1547   Thu Sep 01, 2022   Oswaldo 13:  Patient appears well, vital signs reviewed  Benign abdominal exam   Reviewed previous records  Patient has undergone numerous CT scans  Risk benefits of reimaging discussed with patient  At this time will forego imaging  Plan to complete basic labs and urinalysis  1100 1100:  Labs reviewed  Patient had some improvement in her pain however feels it is returning  Plan to admit for dehydration, pancreatitis, hyperglycemia, discussed with medicine  Medicine has accepted patient for admission, they are requesting CT abdomen pelvis                                               MDM    Disposition  Final diagnoses:   Acute pancreatitis   Left lower quadrant abdominal pain     Time reflects when diagnosis was documented in both MDM as applicable and the Disposition within this note     Time User Action Codes Description Comment    9/1/2022 10:00 AM Yissel Bhatti [K85 [id number]] Acute pancreatitis     9/1/2022 10:00 AM Yissel Bhatti [R10 [id number]] Left lower quadrant abdominal pain       ED Disposition     ED Disposition Admit    Condition   Stable    Date/Time   Thu Sep 1, 2022 10:03 AM    Comment              Follow-up Information    None         Current Discharge Medication List      CONTINUE these medications which have NOT CHANGED    Details   AMILoride 5 mg tablet Take 1 tablet (5 mg total) by mouth daily  Qty: 30 tablet, Refills: 0    Associated Diagnoses: Hypokalemia      amitriptyline (ELAVIL) 10 mg tablet Take 2 tablets (20 mg total) by mouth daily at bedtime  Qty: 60 tablet, Refills: 0    Associated Diagnoses: Migraine      B Complex Vitamins (VITAMIN B COMPLEX PO) Take 1 capsule by mouth daily        escitalopram (LEXAPRO) 10 mg tablet Take 20 mg by mouth daily at bedtime       calcium carbonate (OYSTER SHELL,OSCAL) 500 mg Take 1 tablet by mouth 3 (three) times a day with meals  Qty: 90 tablet, Refills: 0    Associated Diagnoses: Hypokalemia;  Hypocalcemia      Catheters MISC by Does not apply route 2 (two) times a week Port care per [place] protocol  Qty: 999 each, Refills: 11    Associated Diagnoses: Hypokalemia      cyanocobalamin 1,000 mcg/mL Inject 1 mL (1,000 mcg total) into a muscle every 30 (thirty) days  Qty: 1 mL, Refills: 12    Associated Diagnoses: B12 deficiency      ergocalciferol (ERGOCALCIFEROL) 1 25 MG (01565 UT) capsule Take 1 capsule (50,000 Units total) by mouth once a week Mondays and Thursdays    Associated Diagnoses: Vitamin D insufficiency      ferrous sulfate 325 (65 Fe) mg tablet Take 325 mg by mouth every other day Last took 3/9/20       lidocaine (Lidoderm) 5 % Apply 1 patch topically daily for 7 days Remove & Discard patch within 12 hours or as directed by MD  Qty: 7 patch, Refills: 0    Associated Diagnoses: Left flank pain      methocarbamol (ROBAXIN) 500 mg tablet Take 1 tablet (500 mg total) by mouth 2 (two) times a day  Qty: 20 tablet, Refills: 0    Associated Diagnoses: Left flank pain      ondansetron (ZOFRAN) 4 mg tablet Take 1 tablet (4 mg total) by mouth every 8 (eight) hours as needed for nausea or vomiting  Qty: 20 tablet, Refills: 0    Associated Diagnoses: Intractable vomiting      oxyCODONE (Roxicodone) 5 immediate release tablet Take 1 tablet (5 mg total) by mouth every 4 (four) hours as needed for severe pain for up to 10 days Max Daily Amount: 30 mg  Qty: 15 tablet, Refills: 0    Associated Diagnoses: Perianal abscess      oxyCODONE (Roxicodone) 5 mg immediate release tablet Take 1 tablet (5 mg total) by mouth every 4 (four) hours as needed for severe pain for up to 10 daysMax Daily Amount: 30 mg  Qty: 10 tablet, Refills: 0    Associated Diagnoses: Other hemorrhoids      pancrelipase, Lip-Prot-Amyl, (CREON) 24,000 units Take by mouth see administration instructions 3 capsules TID W meal and 1 capsules with Snack       potassium chloride 0 4 mEq/mL Infuse 200 mL (80 mEq total) into a venous catheter daily 80 meq in 1 litre bag to be infused daily over 8 hours  Qty: 2800 mL, Refills: 12    Associated Diagnoses: Hypokalemia      Syringe/Needle, Disp, (SYRINGE 3CC/25GX5/8") 25G X 5/8" 3 ML MISC Use once monthly for B12 injection  Qty: 1 each, Refills: 11    Associated Diagnoses: B12 deficiency             No discharge procedures on file      PDMP Review       Value Time User    PDMP Reviewed  Yes 3/6/2022 10:38 AM Can Lei MD          ED Provider  Electronically Signed by           Estiven Newton MD  09/01/22 6631

## 2022-09-01 NOTE — ASSESSMENT & PLAN NOTE
Patient reports 2 months history of nausea vomiting and diarrhea  Reports intermittent nonbilious vomiting and persistent nausea  Also reports up to 8-10 liquid bowel movements daily  She saw her bariatric surgeon approximately 2 weeks ago and was referred to see a gastroenterologist   Has prior history of C diff in 2015  Will order stool C diff toxin, ova parasites and enteric pathogen panel  P r n  Antiemetics  Continue with IV fluids  Unclear if persistent diarrhea is secondary to chronic pancreatitis versus other pathology    Will have Gastroenterology evaluate

## 2022-09-01 NOTE — PLAN OF CARE
Problem: METABOLIC, FLUID AND ELECTROLYTES - ADULT  Goal: Electrolytes maintained within normal limits  Description: INTERVENTIONS:  - Monitor labs and assess patient for signs and symptoms of electrolyte imbalances monitor potassium levels  - Administer electrolyte replacement as ordered  - Monitor response to electrolyte replacements, including repeat lab results as appropriate  - Instruct patient on fluid and nutrition as appropriate  Outcome: Progressing  Goal: Fluid balance maintained  Description: INTERVENTIONS:  - Monitor labs   - Monitor I/O and WT  - Instruct patient on fluid and nutrition as appropriate  - Assess for signs & symptoms of volume excess or deficit  Outcome: Progressing

## 2022-09-01 NOTE — CONSULTS
Consultation - [facility] at [facility] [address] 44 y o  female MRN: [id number]  Unit/Bed#: -01 Encounter: [id number]        Consults    Reason for Consult / Principal Problem:     Recurrent acute pancreatitis  Chronic diarrhea  Abdominal pain  Status post RYGB          ASSESSMENT AND PLAN:      Recurrent acute pancreatitis  -2nd documented case last 6/1/21  -status post cholecystectomy  -imaging pancreas unremarkable  -liver enzymes normal  -IgG4 negative-was concerning for autoimmune pancreatitis  -no evidence of hypercalcemia  -medications do not appear to be contributory  -evidence of pancreatic insufficiency in the past but currently not on enzyme supplementation for the past 6-8 months  -recommend outpatient EUS for further evaluation of the pancreas by her primary GI team  -consider outpatient genetic testing given her young age and family history of pancreatitis although this likely will not change any management  -recommend aggressive IV fluids to promote decrease in BUN and hematocrit and maintain a urine output of at least 0 5 mL/kg per hour  -okay for clear liquid diet  -pain control but minimize narcotics as a physical exam was benign    Chronic diarrhea  Intussusception  Colitis  Abdominal pain  Status post RYGB  - aside from the intussusception seen on this CT scan, her CT imaging resembles her previous CT scans for which she has undergone extensive evaluations including neuroendocrine tumor workup, celiac disease, multiple infectious workups, multiple endoscopic evaluations with nonspecific findings; last EGD/colonoscopy 06/2021-EGD normal, colonoscopy with TVA of rectum but otherwise appeared normal however biopsy showed focal active pan colitis; flex-sig from 08/2021 was normal and biopsies were negative for colitis  -other differential diagnosis includes dumping syndrome although patient states that she does not consume significant amount of simple carbohydrates while she does have hypoglycemia on this admission there is no record of this being an issue so postprandial hyperinsulinemia hypoglycemia less likely  -recent TSH within normal limits  -given stability of her findings will defer endoscopic evaluation at this time  -agree with infectious workup, add fecal calprotectin and Giardia, consider pancreatic elastase only if stool is not watery  -if infectious workup is negative which I suspect it will be(no leukocytosis, afebrile) would start patient on imodium and titrate  -intussusception is likely transient in nature and patient is not presenting as an acute abdomen or obstruction(of note this was also noted on her CT scan on 10/2021 and follow-up small bowel series showed contrast material passing freely) unlikely requires any acute surgical intervention  -electrolyte replacement as per primary team    Patient will need to follow up with her primary GI team as an outpatient at [facility] upon discharge         ______________________________________________________________________    HPI:  Corbin Jackson is a/an 44 y o  female seen in consultation for acute pancreatitis with chronic history of nausea, vomiting, diarrhea and persistent hypokalemia requiring continuous potassium infusions via a port dating back to at least 2017  She has a history of gastric bypass 2014 with weight loss of about 200 lb  She presents with acute onset of left upper quadrant pain radiating to the back associated nausea and vomiting started at 1:00 a m  today  This is different from her baseline abdominal issues as detailed below  She states that her  has also been having some GI issues for the past 2-3 months  She does not smoke or drink alcohol  No new medications  She is status post cholecystectomy  She had a previous episode of pancreatitis 06/2021  CT imaging without pancreatitis  Lipase 3021       She has had extensive workup in the past with multiple GIs(Klaus Valadez)  Regarding pancreas workup, she is status post remote cholecystectomy, not compatible with auto-immune pancreatitis given normal IGG4 and non-concerning imaging of pancreas  There is evidence of pancreatic insufficiency on 2 episodes(but may have been done on watery stool) she was briefly on pancreatic enzymes but was instructed to stop about 6-8 months ago  Her father has a history of pancreatitis  Denies family history of pancreatic cancer  Non-smoker and does not consume significant alcohol  Denies NSAID use  No recent travel or changes in medications  She has persistent symptoms of N/V/D with concomitant hypokalemia dating back to at least 2017  She has had extensive workup including multiple imaging studies-CTs, SBFT, gastric emptying testing  CT with non-specific "colitis" findings but has had multiple endoscopic evaluations without etiology  With regards to her chronic diarrhea she has negative workup for celiac disease, NET, microscopic colitis,infections  She has severe pancreatic insufficiency(but unsure if stool specimen was appropriate)  She has a history of C diff in 2015  Last EGD 6/2021 which revealed normal esophagus, normal RYGB anatomy, healthy appearing GJA and Roshan limb  Last colonoscopy 6/2021 with focal active pan colitis and TVA in rectum  In 2019 she underwent colonoscopy which showed granular mucosa but biopsies were normal  She underwent flex sig in 2018 due to findings of proctocolitis on CT which was reported as normal except for internal hemorrhoids  In 2017 normal flex sig, but CT scan suggesting colitis  She endorses diarrhea only occurs after she eats  10-12 watery bowel movements per day which are nonbloody in nature  She has tried Bentyl in the past but this made her nauseous  She was told that she should not take antidiarrheals because diarrhea is a sign that your body is trying to get rid of something        REVIEW OF SYSTEMS:    Review of Systems   Constitutional: Positive for appetite change  Negative for fever  Cardiovascular: Negative for chest pain  Gastrointestinal: Positive for abdominal pain, diarrhea, nausea and vomiting  Negative for anal bleeding and blood in stool  Genitourinary: Negative for difficulty urinating  Skin: Negative for pallor  Allergic/Immunologic: Negative for immunocompromised state  Neurological: Positive for light-headedness  Psychiatric/Behavioral: Negative for confusion  Historical Information   Past Medical History:   Diagnosis Date    C  difficile colitis     COVID-19     2022- pt denies long covid    DUB (dysfunctional uterine bleeding)     H  pylori infection     Hypertension     Hypokalemia     Left lower quadrant abdominal pain 2020    Migraine     Psychiatric disorder     Anxiety    Rectal bleeding     Renal disorder     Rhabdomyolysis     Thrombosed external hemorrhoid      Past Surgical History:   Procedure Laterality Date    ABDOMINAL SURGERY      APPENDECTOMY       SECTION      CHOLECYSTECTOMY      COSMETIC SURGERY      "tummy tuck"    FL GUIDED CENTRAL VENOUS ACCESS DEVICE INSERTION  2018    GASTRIC BYPASS      HYSTERECTOMY      LAPAROSCOPY      NJ EXCISION THROMBOSED HEMORRHOID, EXTERNAL N/A 2022    Procedure: EXCISION OF THROMBOSED EXTERNAL HEMORRHOID, Excision of perianal skin tag, dranage of perianal abscess, anal fistulatomy;  Surgeon: Ara Jackson DO;  Location:  MAIN OR;  Service: General    NJ SURG DIAGNOSTIC EXAM, ANORECTAL N/A 2021    Procedure: ANAL EXAM UNDER ANESTHESIA; HEMORRHOIDECTOMY;  Surgeon: Ara Jackson DO;  Location:  MAIN OR;  Service: General    TUNNELED VENOUS PORT PLACEMENT N/A 2018    Procedure: INSERTION VENOUS PORT (PORT-A-CATH);   Surgeon: Gail Do DO;  Location: [facility]IN [facility];  Service: General     Social History   Social History     Substance and Sexual Activity   Alcohol Use Never    Alcohol/week: 0 0 standard drinks    Comment: 0     Social History     Substance and Sexual Activity   Drug Use Never     Social History     Tobacco Use   Smoking Status Never Smoker   Smokeless Tobacco Never Used     Family History   Problem Relation Age of Onset    No Known Problems Mother     Hypertension Father     Diabetes Father     Diabetes Maternal Grandfather        Meds/Allergies     Medications Prior to Admission   Medication    AMILoride 5 mg tablet    amitriptyline (ELAVIL) 10 mg tablet    B Complex Vitamins (VITAMIN B COMPLEX PO)    escitalopram (LEXAPRO) 10 mg tablet    calcium carbonate (OYSTER SHELL,OSCAL) 500 mg    Catheters MISC    cyanocobalamin 1,000 mcg/mL    ergocalciferol (ERGOCALCIFEROL) 1 25 MG (63582 UT) capsule    ferrous sulfate 325 (65 Fe) mg tablet    lidocaine (Lidoderm) 5 %    methocarbamol (ROBAXIN) 500 mg tablet    ondansetron (ZOFRAN) 4 mg tablet    oxyCODONE (Roxicodone) 5 immediate release tablet    oxyCODONE (Roxicodone) 5 mg immediate release tablet    pancrelipase, Lip-Prot-Amyl, (CREON) 24,000 units    potassium chloride 0 4 mEq/mL    Syringe/Needle, Disp, (SYRINGE 3CC/25GX5/8") 25G X 5/8" 3 ML MISC     Current Facility-Administered Medications   Medication Dose Route Frequency    dextrose 5 % and sodium chloride 0 9 % with KCl 20 mEq/L infusion (premix)  100 mL/hr Intravenous Continuous       Allergies   Allergen Reactions    Nsaids Other (See Comments)     Other reaction(s): Other (Please comment)  Should not take s/p bariatric surgery  Other reaction(s): Other (See Comments)  Should not take as per prior records  Should not take s/p bariatric surgery  Has hx of gastric bypass      Prednisone      Nausea, vomiting, diarrhea           Objective     Blood pressure 122/67, pulse 71, temperature 98 1 °F (36 7 °C), resp  rate 18, height 5' 3" (1 6 m), weight 55 kg (121 lb 4 1 oz), SpO2 98 %   Body mass index is 21 48 kg/m²  No intake or output data in the 24 hours ending 09/01/22 1415      PHYSICAL EXAM:      Physical Exam  Constitutional:       General: She is not in acute distress  HENT:      Head: Normocephalic  Eyes:      General: No scleral icterus  Cardiovascular:      Rate and Rhythm: Normal rate  Pulmonary:      Effort: Pulmonary effort is normal    Abdominal:      General: There is no distension  Palpations: Abdomen is soft  Tenderness: There is abdominal tenderness (Initial tenderness in left upper quadrant however when distracted there was no evidence of abdominal pain)  There is no guarding or rebound  Musculoskeletal:         General: No swelling  Skin:     Coloration: Skin is not jaundiced  Neurological:      Mental Status: She is alert and oriented to person, place, and time  Psychiatric:         Mood and Affect: Mood normal              Lab Results:   I have reviewed pertinent labs: Most Recent Labs:   Lab Results   Component Value Date    WBC 5 90 09/01/2022    RBC 4 12 09/01/2022    HGB 11 8 09/01/2022    HCT 37 3 09/01/2022     09/01/2022    RDW 16 7 (H) 09/01/2022    NEUTROABS 3 71 09/01/2022    SODIUM 137 09/01/2022    K 4 1 09/01/2022     09/01/2022    CO2 22 09/01/2022    BUN 12 09/01/2022    CREATININE 1 25 09/01/2022    GLUC 64 (L) 09/01/2022    GLUF 118 (H) 07/08/2022    CALCIUM 8 1 (L) 09/01/2022    AST 22 09/01/2022    ALT 28 09/01/2022    ALKPHOS 41 (L) 09/01/2022    TP 6 6 09/01/2022    ALB 3 2 (L) 09/01/2022    TBILI 0 32 09/01/2022    CHOLESTEROL 161 02/19/2021    HDL 70 02/19/2021    TRIG 72 09/01/2022    LDLCALC 70 02/19/2021    NONHDLC 91 02/19/2021    WGD3ZTXTGVBR 1 450 05/01/2022    FREET4 0 74 (L) 07/14/2018    PREGSERUM Negative 11/03/2018    HCGQUANT <2 02/20/2019    HGBA1C 5 2 02/19/2021     02/19/2021          Imaging Studies: I have personally reviewed pertinent imaging studies

## 2022-09-02 ENCOUNTER — APPOINTMENT (OUTPATIENT)
Dept: ULTRASOUND IMAGING | Facility: HOSPITAL | Age: 37
DRG: 282 | End: 2022-09-02
Payer: COMMERCIAL

## 2022-09-02 LAB
ALBUMIN SERPL BCP-MCNC: 2.4 G/DL (ref 3.5–5)
ALP SERPL-CCNC: 140 U/L (ref 46–116)
ALT SERPL W P-5'-P-CCNC: 154 U/L (ref 12–78)
ANION GAP SERPL CALCULATED.3IONS-SCNC: 8 MMOL/L (ref 4–13)
AST SERPL W P-5'-P-CCNC: 333 U/L (ref 5–45)
BASOPHILS # BLD AUTO: 0.02 THOUSANDS/ΜL (ref 0–0.1)
BASOPHILS NFR BLD AUTO: 0 % (ref 0–1)
BETA-HYDROXYBUTYRATE: 0.4 MMOL/L
BILIRUB SERPL-MCNC: 0.53 MG/DL (ref 0.2–1)
BUN SERPL-MCNC: 7 MG/DL (ref 5–25)
CALCIUM ALBUM COR SERPL-MCNC: 8.7 MG/DL (ref 8.3–10.1)
CALCIUM SERPL-MCNC: 7.4 MG/DL (ref 8.3–10.1)
CHLORIDE SERPL-SCNC: 106 MMOL/L (ref 96–108)
CO2 SERPL-SCNC: 24 MMOL/L (ref 21–32)
CREAT SERPL-MCNC: 1.02 MG/DL (ref 0.6–1.3)
EOSINOPHIL # BLD AUTO: 0.04 THOUSAND/ΜL (ref 0–0.61)
EOSINOPHIL NFR BLD AUTO: 1 % (ref 0–6)
ERYTHROCYTE [DISTWIDTH] IN BLOOD BY AUTOMATED COUNT: 16.6 % (ref 11.6–15.1)
FERRITIN SERPL-MCNC: 26 NG/ML (ref 8–388)
GFR SERPL CREATININE-BSD FRML MDRD: 69 ML/MIN/1.73SQ M
GLUCOSE SERPL-MCNC: 80 MG/DL (ref 65–140)
HAV IGM SER QL: NORMAL
HBV CORE IGM SER QL: NORMAL
HBV SURFACE AG SER QL: NORMAL
HCT VFR BLD AUTO: 31.1 % (ref 34.8–46.1)
HCV AB SER QL: NORMAL
HGB BLD-MCNC: 9.9 G/DL (ref 11.5–15.4)
IGG SERPL-MCNC: 655 MG/DL (ref 700–1600)
IMM GRANULOCYTES # BLD AUTO: 0.03 THOUSAND/UL (ref 0–0.2)
IMM GRANULOCYTES NFR BLD AUTO: 1 % (ref 0–2)
INSULIN SERPL-ACNC: 2.1 MU/L (ref 3–25)
IRON SATN MFR SERPL: 49 % (ref 15–50)
IRON SERPL-MCNC: 116 UG/DL (ref 50–170)
LIPASE SERPL-CCNC: 767 U/L (ref 73–393)
LYMPHOCYTES # BLD AUTO: 0.86 THOUSANDS/ΜL (ref 0.6–4.47)
LYMPHOCYTES NFR BLD AUTO: 15 % (ref 14–44)
MCH RBC QN AUTO: 28.7 PG (ref 26.8–34.3)
MCHC RBC AUTO-ENTMCNC: 31.8 G/DL (ref 31.4–37.4)
MCV RBC AUTO: 90 FL (ref 82–98)
MONOCYTES # BLD AUTO: 0.37 THOUSAND/ΜL (ref 0.17–1.22)
MONOCYTES NFR BLD AUTO: 7 % (ref 4–12)
NEUTROPHILS # BLD AUTO: 4.3 THOUSANDS/ΜL (ref 1.85–7.62)
NEUTS SEG NFR BLD AUTO: 76 % (ref 43–75)
NRBC BLD AUTO-RTO: 0 /100 WBCS
PLATELET # BLD AUTO: 223 THOUSANDS/UL (ref 149–390)
PMV BLD AUTO: 9.7 FL (ref 8.9–12.7)
POTASSIUM SERPL-SCNC: 3 MMOL/L (ref 3.5–5.3)
PROT SERPL-MCNC: 5 G/DL (ref 6.4–8.4)
RBC # BLD AUTO: 3.45 MILLION/UL (ref 3.81–5.12)
SODIUM SERPL-SCNC: 138 MMOL/L (ref 135–147)
TIBC SERPL-MCNC: 235 UG/DL (ref 250–450)
WBC # BLD AUTO: 5.62 THOUSAND/UL (ref 4.31–10.16)

## 2022-09-02 PROCEDURE — 84681 ASSAY OF C-PEPTIDE: CPT | Performed by: INTERNAL MEDICINE

## 2022-09-02 PROCEDURE — 83690 ASSAY OF LIPASE: CPT | Performed by: INTERNAL MEDICINE

## 2022-09-02 PROCEDURE — 76700 US EXAM ABDOM COMPLETE: CPT

## 2022-09-02 PROCEDURE — 83993 ASSAY FOR CALPROTECTIN FECAL: CPT | Performed by: INTERNAL MEDICINE

## 2022-09-02 PROCEDURE — 87329 GIARDIA AG IA: CPT | Performed by: INTERNAL MEDICINE

## 2022-09-02 PROCEDURE — 83550 IRON BINDING TEST: CPT | Performed by: INTERNAL MEDICINE

## 2022-09-02 PROCEDURE — 82728 ASSAY OF FERRITIN: CPT | Performed by: INTERNAL MEDICINE

## 2022-09-02 PROCEDURE — 80053 COMPREHEN METABOLIC PANEL: CPT | Performed by: INTERNAL MEDICINE

## 2022-09-02 PROCEDURE — 87505 NFCT AGENT DETECTION GI: CPT | Performed by: INTERNAL MEDICINE

## 2022-09-02 PROCEDURE — 86015 ACTIN ANTIBODY EACH: CPT | Performed by: INTERNAL MEDICINE

## 2022-09-02 PROCEDURE — 82010 KETONE BODYS QUAN: CPT | Performed by: INTERNAL MEDICINE

## 2022-09-02 PROCEDURE — 87209 SMEAR COMPLEX STAIN: CPT | Performed by: INTERNAL MEDICINE

## 2022-09-02 PROCEDURE — 99232 SBSQ HOSP IP/OBS MODERATE 35: CPT | Performed by: INTERNAL MEDICINE

## 2022-09-02 PROCEDURE — C9113 INJ PANTOPRAZOLE SODIUM, VIA: HCPCS | Performed by: INTERNAL MEDICINE

## 2022-09-02 PROCEDURE — 84630 ASSAY OF ZINC: CPT | Performed by: INTERNAL MEDICINE

## 2022-09-02 PROCEDURE — 83525 ASSAY OF INSULIN: CPT | Performed by: INTERNAL MEDICINE

## 2022-09-02 PROCEDURE — 80074 ACUTE HEPATITIS PANEL: CPT | Performed by: INTERNAL MEDICINE

## 2022-09-02 PROCEDURE — 84206 ASSAY OF PROINSULIN: CPT | Performed by: INTERNAL MEDICINE

## 2022-09-02 PROCEDURE — 82784 ASSAY IGA/IGD/IGG/IGM EACH: CPT | Performed by: INTERNAL MEDICINE

## 2022-09-02 PROCEDURE — 87177 OVA AND PARASITES SMEARS: CPT | Performed by: INTERNAL MEDICINE

## 2022-09-02 PROCEDURE — 83540 ASSAY OF IRON: CPT | Performed by: INTERNAL MEDICINE

## 2022-09-02 PROCEDURE — 86038 ANTINUCLEAR ANTIBODIES: CPT | Performed by: INTERNAL MEDICINE

## 2022-09-02 PROCEDURE — 85025 COMPLETE CBC W/AUTO DIFF WBC: CPT | Performed by: INTERNAL MEDICINE

## 2022-09-02 PROCEDURE — 87493 C DIFF AMPLIFIED PROBE: CPT | Performed by: INTERNAL MEDICINE

## 2022-09-02 RX ORDER — POTASSIUM CHLORIDE 29.8 MG/ML
40 INJECTION INTRAVENOUS 2 TIMES DAILY
Status: DISCONTINUED | OUTPATIENT
Start: 2022-09-02 | End: 2022-09-03 | Stop reason: HOSPADM

## 2022-09-02 RX ADMIN — MORPHINE SULFATE 2 MG: 2 INJECTION, SOLUTION INTRAMUSCULAR; INTRAVENOUS at 09:10

## 2022-09-02 RX ADMIN — POTASSIUM CHLORIDE 40 MEQ: 29.8 INJECTION, SOLUTION INTRAVENOUS at 09:17

## 2022-09-02 RX ADMIN — SODIUM CHLORIDE, SODIUM LACTATE, POTASSIUM CHLORIDE, AND CALCIUM CHLORIDE 250 ML/HR: .6; .31; .03; .02 INJECTION, SOLUTION INTRAVENOUS at 15:33

## 2022-09-02 RX ADMIN — AMILORIDE HYDROCLORIDE 5 MG: 5 TABLET ORAL at 09:17

## 2022-09-02 RX ADMIN — AMITRIPTYLINE HYDROCHLORIDE 20 MG: 10 TABLET, FILM COATED ORAL at 21:00

## 2022-09-02 RX ADMIN — ONDANSETRON 4 MG: 2 INJECTION INTRAMUSCULAR; INTRAVENOUS at 11:30

## 2022-09-02 RX ADMIN — THIAMINE HYDROCHLORIDE 100 MG: 100 INJECTION, SOLUTION INTRAMUSCULAR; INTRAVENOUS at 14:13

## 2022-09-02 RX ADMIN — PANTOPRAZOLE SODIUM 40 MG: 40 INJECTION, POWDER, FOR SOLUTION INTRAVENOUS at 09:16

## 2022-09-02 RX ADMIN — SODIUM CHLORIDE, SODIUM LACTATE, POTASSIUM CHLORIDE, AND CALCIUM CHLORIDE 250 ML/HR: .6; .31; .03; .02 INJECTION, SOLUTION INTRAVENOUS at 01:53

## 2022-09-02 RX ADMIN — MORPHINE SULFATE 2 MG: 2 INJECTION, SOLUTION INTRAMUSCULAR; INTRAVENOUS at 19:45

## 2022-09-02 RX ADMIN — MORPHINE SULFATE 2 MG: 2 INJECTION, SOLUTION INTRAMUSCULAR; INTRAVENOUS at 11:31

## 2022-09-02 RX ADMIN — MORPHINE SULFATE 2 MG: 2 INJECTION, SOLUTION INTRAMUSCULAR; INTRAVENOUS at 05:30

## 2022-09-02 RX ADMIN — POTASSIUM CHLORIDE 40 MEQ: 29.8 INJECTION, SOLUTION INTRAVENOUS at 20:15

## 2022-09-02 RX ADMIN — SODIUM CHLORIDE, SODIUM LACTATE, POTASSIUM CHLORIDE, AND CALCIUM CHLORIDE 250 ML/HR: .6; .31; .03; .02 INJECTION, SOLUTION INTRAVENOUS at 20:16

## 2022-09-02 RX ADMIN — ESCITALOPRAM OXALATE 20 MG: 10 TABLET ORAL at 21:00

## 2022-09-02 RX ADMIN — SODIUM CHLORIDE, SODIUM LACTATE, POTASSIUM CHLORIDE, AND CALCIUM CHLORIDE 250 ML/HR: .6; .31; .03; .02 INJECTION, SOLUTION INTRAVENOUS at 10:49

## 2022-09-02 RX ADMIN — ONDANSETRON 4 MG: 2 INJECTION INTRAMUSCULAR; INTRAVENOUS at 19:22

## 2022-09-02 RX ADMIN — MORPHINE SULFATE 2 MG: 2 INJECTION, SOLUTION INTRAMUSCULAR; INTRAVENOUS at 15:50

## 2022-09-02 RX ADMIN — SODIUM CHLORIDE, SODIUM LACTATE, POTASSIUM CHLORIDE, AND CALCIUM CHLORIDE 250 ML/HR: .6; .31; .03; .02 INJECTION, SOLUTION INTRAVENOUS at 06:50

## 2022-09-02 NOTE — PROGRESS NOTES
[facility]  Progress Note - Arlene Bowen 44 y o  female MRN: [id number]    Unit/Bed#: -01 Encounter: [id number]    Assessment/Plan:  Recurrent acute pancreatitis  -2nd documented case last 6/2021  -status post cholecystectomy  -imaging pancreas unremarkable-no findings consistent with chronic pancreatitis  -liver enzymes initially normal but now with considerable increase  -IgG4 negative-not concerning for autoimmune pancreatitis  -no evidence of hypercalcemia  -medications do not appear to be contributory  -evidence of pancreatic insufficiency in the past but currently not on enzyme supplementation for the past 6-8 months  -recommend outpatient EUS for further evaluation of the pancreas by her primary GI team  -consider outpatient genetic testing given her young age and family history of pancreatitis although this likely will not change any management  -may liberalize IV fluids once on clear liquid diet given good reduction in BUN and hematocrit  -tolerated clear liquid diet however made NPO this morning for ultrasound, may resume clear liquid diet after ultrasound, and advance as tolerated(would advise on lactose-free and low fat diet)  -pain management as per primary team     Elevated liver enzymes  -mixed pattern(cholestatic and hepatocellular)  -status post cholecystectomy  -unclear etiology-?  Ischemic given episode of hypotension, no new medications no herbal supplementation, check acute hepatitis panel, iron panel, autoimmune panel, ceruloplasmin (ordered)   -check ultrasound with liver Dopplers(ordered) to evaluate for thrombus  -serologic workup  -no signs of hepatic encephalopathy  -continue to monitor  -check INR     Chronic diarrhea  Intussusception  Colitis  Abdominal pain  Status post RYGB  - aside from the intussusception seen on this CT scan, her CT imaging resembles her previous CT scans for which she has undergone extensive evaluations including neuroendocrine tumor workup, celiac disease, multiple infectious workups, multiple endoscopic evaluations with nonspecific findings; last EGD/colonoscopy 06/2021-EGD normal, colonoscopy with TVA of rectum but otherwise appeared normal however biopsy showed focal active pan colitis; flex-sig from 08/2021 was normal and biopsies were negative for colitis  -other differential diagnosis includes dumping syndrome although patient states that she does not consume significant amount of simple carbohydrates while she does have hypoglycemia on this admission there is no record of this being an issue so postprandial hyperinsulinemia hypoglycemia less likely  -recent TSH within normal limits  -given stability of her findings will defer endoscopic evaluation at this time  -agree with infectious workup, add fecal calprotectin and Giardia, consider pancreatic elastase only if stool is not watery  -have been treated for SIBO in the past with Xifaxan as per patient  -she has had extensive workup in the past for her symptoms  -her diarrhea is only with food-only had 1 bowel movement which was watery in nature this morning since admission  -if infectious workup is negative which I suspect it will be(no leukocytosis, afebrile) would start patient on imodium and titrate  -intussusception is likely transient in nature and patient is not presenting as an acute abdomen or obstruction(of note this was also noted on her CT scan on 10/2021 and follow-up small bowel series showed contrast material passing freely) unlikely requires any acute surgical intervention  -electrolyte replacement as per primary team  -Check zinc level(ordered)  -hereditary hemochromatosis could be a possible etiology for pancreatic insufficiency-check iron panel(ordered)    Hypokalemia  -extensive workup in the past  -chronically maintained on IV potassium supplementation via a port and followed closely by Nephrology  -denies licorice ingestion  -did not have excess of GI loss when previously checked  -? Hyperinsulinemia due to beta cell hypertrophy following gastric bypass-check fasting insulin, C-peptide, beta hydroxybutyrate and proinsulin levels  -replete as per primary team     Patient will need to follow up with her primary GI team as an outpatient at [facility] upon discharge  Subjective:   Patient seen and examined  Had 1 loose/watery nonbloody bowel movement this morning  Tolerated clear liquid diet yesterday  Still having pain + nausea but no vomiting    Objective:     Vitals: Blood pressure 106/66, pulse 75, temperature 98 7 °F (37 1 °C), resp  rate 18, height 5' 3" (1 6 m), weight 55 kg (121 lb 4 1 oz), SpO2 99 %  ,Body mass index is 21 48 kg/m²  Intake/Output Summary (Last 24 hours) at 9/2/2022 6814  Last data filed at 9/2/2022 2044  Gross per 24 hour   Intake --   Output 600 ml   Net -600 ml       Review of Systems: as per HPI  Review of Systems    Physical Exam:     Physical Exam  Constitutional:       General: She is not in acute distress  HENT:      Head: Normocephalic  Eyes:      General: No scleral icterus  Cardiovascular:      Rate and Rhythm: Normal rate  Pulmonary:      Effort: Pulmonary effort is normal    Abdominal:      General: There is no distension  Palpations: Abdomen is soft  There is no mass  Tenderness: There is abdominal tenderness (Left upper quadrant tenderness)  There is no guarding  Musculoskeletal:         General: No swelling  Cervical back: Neck supple  Skin:     Coloration: Skin is not jaundiced  Neurological:      Mental Status: She is alert and oriented to person, place, and time     Psychiatric:         Mood and Affect: Mood normal            Invasive Devices  Report    Central Venous Catheter Line  Duration           Port A Cath Right Chest -- days          Peripheral Intravenous Line  Duration           Peripheral IV 09/01/22 Right Antecubital <1 day                        CBC:   Lab Results   Component Value Date WBC 5 62 09/02/2022    HGB 9 9 (L) 09/02/2022    HCT 31 1 (L) 09/02/2022    MCV 90 09/02/2022     09/02/2022    MCH 28 7 09/02/2022    MCHC 31 8 09/02/2022    RDW 16 6 (H) 09/02/2022    MPV 9 7 09/02/2022    NRBC 0 09/02/2022   ,   CMP:   Lab Results   Component Value Date    K 3 0 (L) 09/02/2022     09/02/2022    CO2 24 09/02/2022    BUN 7 09/02/2022    CREATININE 1 02 09/02/2022    CALCIUM 7 4 (L) 09/02/2022     (H) 09/02/2022     (H) 09/02/2022    ALKPHOS 140 (H) 09/02/2022    EGFR 69 09/02/2022   ,   Lipase:   Lab Results   Component Value Date    LIPASE 767 (H) 09/02/2022

## 2022-09-02 NOTE — UTILIZATION REVIEW
Initial Clinical Review    Admission: Date/Time/Statement:   Admission Orders (From admission, onward)     Ordered        09/01/22 1127  1 [facility],[address] [place]  Once                      Orders Placed This Encounter   Procedures    INPATIENT ADMISSION     Standing Status:   Standing     Number of Occurrences:   1     Order Specific Question:   Level of Care     Answer:   Med Surg [16]     Order Specific Question:   Estimated length of stay     Answer:   More than 2 Midnights     Order Specific Question:   Certification     Answer:   I certify that inpatient services are medically necessary for this patient for a duration of greater than two midnights  See H&P and MD Progress Notes for additional information about the patient's course of treatment  ED Arrival Information     Expected   -    Arrival   9/1/2022 08:45    Acuity   Urgent            Means of arrival   Walk-In    Escorted by   Self    Service   Hospitalist    Admission type   Urgent            Arrival complaint   Abdominal pain, Back pain           Chief Complaint   Patient presents with    Abdominal Pain     Pt reports left flank pain radiating into LLQ, pt reports they attempted to urinate but couldn't, also having nausea/vomiting/diarrhea       Initial Presentation: 44 y o  female presents to the ED from home with c/o of abdominal pain  PMH of gastric bypass surgery in 2014,  pancreatitis, C diff colitis and hypokalemia requiring daily IV potassium administration  Patient states the pain is in the left upper quadrant sharp and stabbing in nature,non-radiating,  moderate to severe intensity associated with nausea and vomiting  Also reports dysuria  Patient reports that over the last 2 months she has been having persistent N/V/D  She recently saw her bariatric surgeon and was referred to Gastroenterology  PE: Abdominal tenderness in LUQ, abdomen soft, non-distended, audible bowel sounds       9/1 Plan: Inpatient admission for evaluation and treatment of acute pancreatitis, NVD and hypokalemia:  Follow CT AP, IVF, PRN anti-emetics, consult Gastroenterology  Continue daily infusion of 80 mEq of IV potassium  9/1 Gastroenterology consult: Recurrent acute pancreatitis, second documented case,  last 6/1/21  Imaging pancreas unremarkable, s/p cholecystectomy, liver enzymes normal, no evidence of hypercalemia, IgG4 negative, medications do not appear contributary  Evidence of pancreatic insufficiency in the past but currently not on enzyme supplementation for the past 6-8 months  Recommend aggressive IV fluids to promote decrease in BUN and hematocrit and maintain a urine output of at least 0 5 mL/kg per hour, okay for clear liquid diet, pain control but minimize narcotics  Recommend outpatient EUS for further evaluation of the pancreas by her primary GI team  Chronic diarrhea, intussusception, colitis, abdominal pain  Agree with infectious workup, add fecal calprotectin and Giardia, consider pancreatic elastase only if stool is not watery  If infectious workup is negative, would start patient on imodium and titrate  Intussusception is likely transient in nature and patient is not presenting as an acute abdomen or obstruction(of note this was also noted on her CT scan on 10/2021 and follow-up small bowel series showed contrast material passing freely) unlikely requires any acute surgical intervention  Electrolyte replacement as per primary team      9/2 Gastroenterology:  Liver enzymes initially normal but now with considerable increase  Mixed pattern(cholestatic and hepatocellular)  status post cholecystectomy  Unclear etiology-?  Ischemic given episode of hypotension, no new medications no herbal supplementation, check acute hepatitis panel, iron panel, autoimmune panel, ceruloplasmin  Check ultrasound with liver Dopplers to evaluate for thrombus, serologic workup, no signs of hepatic encephalopathy, continue to monitor, check INR    May resume clear liquid diet after US, advance diet as tolerated, advise lactose free and low fat  Internal Medicine: Patient still c/o nausea, but reports decreased abdominal pain  Diarrhea improved  Tolerating clear liquid diet  Continue with aggressive IV hydration, follow up on lipase  PRN analgesics and anti-emetics  Trend LFTs, follow autoimmune hepatitis panel, await stool C diff toxin, O&P and enteric pathogen panel  PE: LUQ abdominal tenderness  ED Triage Vitals   Temperature Pulse Respirations Blood Pressure SpO2   09/01/22 0851 09/01/22 0851 09/01/22 0851 09/01/22 0851 09/01/22 0851   97 7 °F (36 5 °C) 86 20 116/58 100 %      Temp Source Heart Rate Source Patient Position - Orthostatic VS BP Location FiO2 (%)   09/01/22 0851 09/01/22 0851 09/01/22 0851 09/01/22 0851 --   Temporal Monitor Sitting Left arm       Pain Score       09/01/22 0939       9          Wt Readings from Last 1 Encounters:   09/01/22 55 kg (121 lb 4 1 oz)     Additional Vital Signs:       Date/Time Temp Pulse Resp BP MAP (mmHg) SpO2 O2 Device   09/02/22 15:21:48 98 °F (36 7 °C) 82 20 108/60 76 98 % --   09/02/22 0900 -- -- -- -- -- -- None (Room air)   09/02/22 07:08:16 98 7 °F (37 1 °C) 75 18 106/66 79 99 % --   09/01/22 23:38:34 98 8 °F (37 1 °C) 80 16 88/53 Abnormal  65 97 % None (Room air)   09/01/22 12:22:15 98 1 °F (36 7 °C) 71 18 122/67 85 98 % --   09/01/22 1200 -- -- -- -- -- -- None (Room air)   09/01/22 1100 -- 79 16 115/52 80 100 % None (Room air)   09/01/22 1030 -- 86 16 125/57 84 100 % None (Room air)   09/01/22 0900 -- 79 19 116/58 83 100 % None (Room air)         Pertinent Labs/Diagnostic Test Results:       US abdomen complete with doppler   Final Result by Kanu García MD (09/02 1337)      Normal             XR elbow 2 vw right   Final Result by Gregg Martinez MD (09/02 8753)   Soft tissue swelling    No evidence of extravasated contrast           CT abdomen pelvis with contrast   Final Result by Melony Winchester DO (09/01 1538)      No CT findings for pancreatitis  Short segment small bowel - small bowel intussusception in the left lower quadrant, likely transient  Much of the colon is fluid-filled, some segments are prominent in caliber and there is suggestion of rectal wall thickening, which may be secondary to colitis  Additional findings as above  The study was marked in EPIC for significant notification            Results from last 7 days   Lab Units 09/02/22  0455 09/01/22  0925   WBC Thousand/uL 5 62 5 90   HEMOGLOBIN g/dL 9 9* 11 8   HEMATOCRIT % 31 1* 37 3   PLATELETS Thousands/uL 223 286   NEUTROS ABS Thousands/µL 4 30 3 71         Results from last 7 days   Lab Units 09/02/22  0455 09/01/22  0925   SODIUM mmol/L 138 137   POTASSIUM mmol/L 3 0* 4 1   CHLORIDE mmol/L 106 106   CO2 mmol/L 24 22   ANION GAP mmol/L 8 9   BUN mg/dL 7 12   CREATININE mg/dL 1 02 1 25   EGFR ml/min/1 73sq m 69 54   CALCIUM mg/dL 7 4* 8 1*     Results from last 7 days   Lab Units 09/02/22  0455 09/01/22  0925   AST U/L 333* 22   ALT U/L 154* 28   ALK PHOS U/L 140* 41*   TOTAL PROTEIN g/dL 5 0* 6 6   ALBUMIN g/dL 2 4* 3 2*   TOTAL BILIRUBIN mg/dL 0 53 0 32         Results from last 7 days   Lab Units 09/02/22  0455 09/01/22  0925   GLUCOSE RANDOM mg/dL 80 64*             BETA-HYDROXYBUTYRATE   Date Value Ref Range Status   09/02/2022 0 4 <0 6 mmol/L Final          Results from last 7 days   Lab Units 09/01/22  1741   LACTIC ACID mmol/L 0 6                 Results from last 7 days   Lab Units 09/02/22  0455   FERRITIN ng/mL 26     Results from last 7 days   Lab Units 09/02/22  0640   HEP B S AG  Non-reactive   HEP C AB  Non-reactive   HEP B C IGM  Non-reactive     Results from last 7 days   Lab Units 09/02/22  0455 09/01/22  0925   LIPASE u/L 767* 3,021*                 Results from last 7 days   Lab Units 09/01/22  0906   CLARITY UA  Clear   COLOR UA  Yellow   SPEC GRAV UA  >=1 030   PH UA  5 5   GLUCOSE UA mg/dl Negative   KETONES UA mg/dl Negative   BLOOD UA  Small*   PROTEIN UA mg/dl 30 (1+)*   NITRITE UA  Negative   BILIRUBIN UA  Negative   UROBILINOGEN UA E U /dl 0 2   LEUKOCYTES UA  Negative   WBC UA /hpf 0-1   RBC UA /hpf 2-4   BACTERIA UA /hpf None Seen   EPITHELIAL CELLS WET PREP /hpf Occasional                 ED Treatment:   Medication Administration from 09/01/2022 0843 to 09/01/2022 1214       Date/Time Order Dose Route Action     09/01/2022 0939 fentanyl citrate (PF) 100 MCG/2ML 50 mcg 50 mcg Intravenous Given     09/01/2022 0938 ondansetron (ZOFRAN) injection 4 mg 4 mg Intravenous Given     09/01/2022 1044 dextrose 5 % and sodium chloride 0 9 % with KCl 20 mEq/L infusion (premix) 100 mL/hr Intravenous New Bag     09/01/2022 1101 morphine injection 4 mg 4 mg Intravenous Given        Past Medical History:   Diagnosis Date    C  difficile colitis     COVID-19     1/2022- pt denies long covid    DUB (dysfunctional uterine bleeding)     H  pylori infection     Hypertension     Hypokalemia     Left lower quadrant abdominal pain 08/17/2020    Migraine     Psychiatric disorder     Anxiety    Rectal bleeding     Renal disorder     Rhabdomyolysis     Thrombosed external hemorrhoid      Present on Admission:   Hypokalemia      Admitting Diagnosis: Acute pancreatitis [K85 90]  Left lower quadrant abdominal pain [R10 32]  Age/Sex: 44 y o  female       Admission Orders: NPO             Scheduled Medications:      AMILoride, 5 mg, Oral, Daily  amitriptyline, 20 mg, Oral, HS  escitalopram, 20 mg, Oral, HS  pantoprazole, 40 mg, Intravenous, Q24H MIKE  potassium chloride, 40 mEq, Intravenous, BID  thiamine, 100 mg, Intravenous, Daily      Continuous IV Infusions:      lactated ringers, 250 mL/hr, Intravenous, Continuous      PRN Meds:  Medications 08/31 09/01 09/02   acetaminophen (TYLENOL) tablet 650 mg  Dose: 650 mg  Freq: Every 6 hours PRN Route: PO  PRN Reason: mild pain  Indications of Use: FEVER,HEADACHE,MILD PAIN  Start: 09/01/22 1426         iohexol (OMNIPAQUE) 350 MG/ML injection (MULTI-DOSE) 75 mL  Dose: 75 mL  Freq: Once in imaging Route: IV  PRN Reason: contrast  Start: 09/01/22 1514 End: 09/01/22 1516     1516         morphine injection 2 mg  Dose: 2 mg  Freq: Every 6 hours PRN Route: IV  PRN Reason: breakthrough pain  Start: 09/02/22 0830   Admin Instructions:   High alert medication  [employer] [facility]      [id number]     1550        morphine injection 2 mg  Dose: 2 mg  Freq: Every 4 hours PRN Route: IV  PRN Reason: breakthrough pain  Start: 09/01/22 1434 End: 09/02/22 0829   Admin Instructions:   High alert medication  LOOK [employer] [facility]     7186     9856      0829-D/C'd      morphine injection 2 mg  Dose: 2 mg  Freq: Every 6 hours PRN Route: IV  PRN Reason: severe pain  Start: 09/01/22 1434   Admin Instructions:   High alert medication  [employer]     1933      0530     1131           ondansetron (ZOFRAN) injection 4 mg  Dose: 4 mg  Freq: Every 6 hours PRN Route: IV  PRN Reasons: nausea,vomiting  Start: 09/01/22 1426   Admin Instructions:   Push over 2 minutes  2102      1130        Medications 08/31 09/01 09/02           IP CONSULT TO GASTROENTEROLOGY    [facility] Review Department  ATTENTION: Please call with any questions or concerns to [phone] and carefully listen to the prompts so that you are directed to the right person  All voicemails are confidential   Marianna Mohan all requests for admission clinical reviews, approved or denied determinations and any other requests to dedicated fax number below belonging to the campus where the patient is receiving treatment   List of dedicated fax numbers for the Facilities:  [address] DENIALS (Administrative/Medical Necessity) [phone]   [address] (Maternity/NICU/Pediatrics) [address] Floor [phone]   [address] [phone] 8049 [facility] [phone]   [address] [facility] [address] [id number] [place] [address] [address] [place] [address] [phone]

## 2022-09-02 NOTE — PROGRESS NOTES
[address] [facility]  Progress Note - Joyce Cabrera 1985, 44 y o  female MRN: [id number]  Unit/Bed#: -01 Encounter: [id number]  Primary Care Provider: Brenda Connolly MD   Date and time admitted to hospital: 9/1/2022  8:46 AM    * Acute pancreatitis  Assessment & Plan  Patient presents with 1 day history of epigastric and left upper quadrant abdominal pain  Associated with nausea and nonbilious vomiting  Lipase elevated at 3021  Subsequently trending down  CT abdomen reviewed and unremarkable  Patient has history of cholecystectomy  Denies any alcohol use  Currently not on any medications which can cause pancreatitis  She does report prior history of bouts pancreatitis approximately 1 year ago and was maintained on Creon however has been off it for over 2 months  Continue with aggressive IV hydration  Follow up on lipase  Clear liquid diet for now  Gradually advanced as tolerated  P r n  Analgesics and antiemetics  GI input noted and appreciated              Abnormal LFTs  Assessment & Plan  LFTs trending down today  Etiology unclear  Patient is status post cholecystectomy  Hepatitis panel negative  Follow-up on ultrasound with Dopplers  Iron panel unremarkable  Follow-up on autoimmune hepatitis panel  Trend LFTs in a m  Nausea vomiting and diarrhea  Assessment & Plan  Patient reports 2 months history of nausea vomiting and diarrhea  Reports intermittent nonbilious vomiting and persistent nausea  Also reports up to 8-10 liquid bowel movements daily  She saw her bariatric surgeon approximately 2 weeks ago and was referred to see a gastroenterologist   Has prior history of C diff in 2015  Await stool C diff toxin, ova parasites and enteric pathogen panel  P r n  Antiemetics  Continue with IV fluids  Unclear if persistent diarrhea is secondary to chronic pancreatitis versus other pathology    GI input appreciated      Hypokalemia  Assessment & Plan  Patient has history of chronic hypokalemia for which she follows up with Nephrology   She has a port in place and gets daily infusion of 80 mEq of IV potassium  Will continue during hospitalization  Follow-up BMP daily        VTE Pharmacologic Prophylaxis: VTE Score: 0 Moderate Risk (Score 3-4) - Pharmacological DVT Prophylaxis Ordered: enoxaparin (Lovenox)  Patient Centered Rounds: I performed bedside rounds with nursing staff today  Discussions with Specialists or Other Care Team Provider:  Discussed with Gastroenterology    Education and Discussions with Family / Patient: Patient declined call to   Time Spent for Care: 30 minutes  More than 50% of total time spent on counseling and coordination of care as described above  Current Length of Stay: 1 day(s)  Current Patient Status: Inpatient   Certification Statement: The patient will continue to require additional inpatient hospital stay due to Ongoing management of pancreatitis  Discharge Plan: Anticipate discharge in 48 hrs to home  Code Status: Level 1 - Full Code    Subjective:   Patient seen and examined at bedside  Still complains of nausea but reports decreased abdominal pain  Diarrhea is improved but patient has been on clear liquids only  Has been tolerating clear liquid diet  Objective:     Vitals:   Temp (24hrs), Av 8 °F (37 1 °C), Min:98 7 °F (37 1 °C), Max:98 8 °F (37 1 °C)    Temp:  [98 7 °F (37 1 °C)-98 8 °F (37 1 °C)] 98 7 °F (37 1 °C)  HR:  [75-80] 75  Resp:  [16-18] 18  BP: ()/(53-66) 106/66  SpO2:  [97 %-99 %] 99 %  Body mass index is 21 48 kg/m²  Input and Output Summary (last 24 hours): Intake/Output Summary (Last 24 hours) at 2022 1307  Last data filed at 2022 0806  Gross per 24 hour   Intake --   Output 600 ml   Net -600 ml       Physical Exam:   Physical Exam  Constitutional:       General: She is not in acute distress  HENT:      Head: Normocephalic        Nose: Nose normal       Mouth/Throat:      Mouth: Mucous membranes are moist    Eyes:      Extraocular Movements: Extraocular movements intact  Pupils: Pupils are equal, round, and reactive to light  Cardiovascular:      Rate and Rhythm: Normal rate and regular rhythm  Pulses: Normal pulses  Pulmonary:      Effort: Pulmonary effort is normal       Breath sounds: Normal breath sounds  Abdominal:      Comments: Diminished left upper quadrant tenderness  Abdomen soft  bowel sounds audible  No hepatosplenomegaly appreciated   Musculoskeletal:      Cervical back: Neck supple  Skin:     Coloration: Skin is pale  Neurological:      General: No focal deficit present  Mental Status: She is alert and oriented to person, place, and time  Mental status is at baseline     Psychiatric:         Mood and Affect: Mood normal          Additional Data:     Labs:  Results from last 7 days   Lab Units 09/02/22  0455   WBC Thousand/uL 5 62   HEMOGLOBIN g/dL 9 9*   HEMATOCRIT % 31 1*   PLATELETS Thousands/uL 223   NEUTROS PCT % 76*   LYMPHS PCT % 15   MONOS PCT % 7   EOS PCT % 1     Results from last 7 days   Lab Units 09/02/22  0455   SODIUM mmol/L 138   POTASSIUM mmol/L 3 0*   CHLORIDE mmol/L 106   CO2 mmol/L 24   BUN mg/dL 7   CREATININE mg/dL 1 02   ANION GAP mmol/L 8   CALCIUM mg/dL 7 4*   ALBUMIN g/dL 2 4*   TOTAL BILIRUBIN mg/dL 0 53   ALK PHOS U/L 140*   ALT U/L 154*   AST U/L 333*   GLUCOSE RANDOM mg/dL 80                 Results from last 7 days   Lab Units 09/01/22  1741   LACTIC ACID mmol/L 0 6       Lines/Drains:  Invasive Devices  Report    Central Venous Catheter Line  Duration           Port A Cath Right Chest -- days                Central Line:  Goal for removal: Port in place for chronic infusion             Imaging: Reviewed radiology reports from this admission including: abdominal/pelvic CT    Recent Cultures (last 7 days):         Last 24 Hours Medication List:   Current Facility-Administered Medications   Medication Dose Route Frequency Provider Last Rate    acetaminophen  650 mg Oral Q6H PRN Latricia Ch MD      AMILoride  5 mg Oral Daily Latricia Ch MD      amitriptyline  20 mg Oral HS Latricia Ch, MD      escitalopram  20 mg Oral HS Latricia Ch MD      lactated ringers  250 mL/hr Intravenous Continuous Abby Hanson  mL/hr (09/02/22 1049)    morphine injection  2 mg Intravenous Q6H PRN Latricia Ch MD      morphine injection  2 mg Intravenous Q6H PRN Latricia Ch MD      ondansetron  4 mg Intravenous Q6H PRN Latricia Ch MD      pantoprazole  40 mg Intravenous Q24H Leonardo Granger MD      potassium chloride  40 mEq Intravenous BID Latricia Ch MD 40 mEq (09/02/22 0917)    thiamine  100 mg Intravenous Daily EileenARIADNA Mcrae 100 mg (09/01/22 1832)        Today, Patient Was Seen By: Latricia Ch MD    **Please Note: This note may have been constructed using a voice recognition system  **

## 2022-09-02 NOTE — CASE MANAGEMENT
Case Management Assessment & Discharge Planning Note    Patient name Randi Flow  Location /-43 MRN [id number]  : 1985 Date 2022       Current Admission Date: 2022  Current Admission Diagnosis:Acute pancreatitis   Patient Active Problem List    Diagnosis Date Noted    Acute pancreatitis 2022    Perianal abscess 2022    Episode of shaking     Psychogenic nonepileptic spells 2022    History of anxiety 2022    Intractable vomiting 2022    Acute hyponatremia 2022    Iron deficiency 2021    Other hemorrhoids     Open wound / skin tear of right chest wall 2021    Cellulitis of chest wall 2021    Chronic pancreatitis ([address] ) 2021    Acute on chronic abdominal pain 2021    Blood creatinine increased compared with prior measurement 2021    Gas pain 2021    Left lower quadrant abdominal pain 2020    Middle ear effusion 2020    Normal anion gap metabolic acidosis     Nausea vomiting and diarrhea 2019    Vertigo 2019    Stress fracture of right foot with routine healing 2019    Right ovarian cyst 2019    Chest pain 2019    Other intestinal malabsorption 10/05/2018    Gastric bypass status for obesity 10/02/2018    GERD (gastroesophageal reflux disease) 2018    Paresthesia of both lower extremities 08/15/2018    Paresthesias 08/15/2018    Fatigue 2018    Elevated CPK 2018    Vitamin B12 deficiency 2018    Cervical lymphadenopathy 2018    Anemia 07/10/2018    Vitamin D insufficiency 07/10/2018    Hypocalcemia 2018    Hypophosphatemia 2018    Hypokalemia 2018    History of gastric bypass 2018    Migraine without aura and without status migrainosus, not intractable 2018    Left-sided weakness 2018    B12 deficiency 2016    WAGNER (obstructive sleep apnea) 06/11/2014    Migraine 05/29/2014      LOS (days): 1  Geometric Mean LOS (GMLOS) (days):   Days to GMLOS:     OBJECTIVE:    Risk of Unplanned Readmission Score: 23 44         Current admission status: Inpatient  Referral Reason:  (discharge planning)    Preferred Pharmacy:   [facility] # [address], [address]  [address] [address]  Phone: [phone] Fax: [phone] Pending sale to [facility] [address] [place], [facility] 94  [facility] [address]  Phone: [phone] Fax: [phone]    Primary Care Provider: Shayy Toth MD    Primary Insurance: [facility]  Secondary Insurance:     ASSESSMENT:  CM met with patient at the bedside,baseline information  was obtained  CM discussed the role of CM in helping the patient develop a discharge plan and assist the patient in carry out their plan  Patient stated she lives with her spouse and 2 children  Ranch style home no steps  patient  Stated at times she has financial troubles due to high medical bills  She stated she applied and received SNAP  Pt agreeable with information for free food sarm[place] in area and [facility] community resource list for assistance   Hand out provided at [facility] Proxies    There are no active Health Care Proxies on file  Advance Directives  Does patient have a [address]?: Yes  Does patient have Advance Directives?: Yes  Advance Directives: Living will  Primary Contact: Jennyfer Grace  spouse       Readmission Root Cause  30 Day Readmission: No    Patient Information  Admitted from[de-identified] Home  Mental Status: Alert  During Assessment patient was accompanied by: Not accompanied during assessment  Assessment information provided by[de-identified] Patient  Support Systems: Spouse/significant other Thanpat Ugarte)  [place] of Residence: [address] [place] [address] do you live in?: [address] entry access options   Select all that apply : No steps to enter home  Type of Current Residence: [place] Ruggiero  In the last 12 months, was there a time when you were not able to pay the mortgage or rent on time?: No  In the last 12 months, how many places have you lived?: 1  In the last 12 months, was there a time when you did not have a steady place to sleep or slept in a shelter (including now)?: No  Homeless/housing insecurity resource given?: N/A  Living Arrangements: Lives w/ Spouse/significant other, Other (Comment) (2 children)  Is patient a ?: No    Activities of Daily Living Prior to Admission  Functional Status: Independent  Completes ADLs independently?: Yes  Ambulates independently?: Yes  Does patient use assisted devices?: No  Does patient currently own DME?: No  Does patient have a history of Outpatient Therapy (PT/OT)?: No  Does the patient have a history of Short-Term Rehab?: No  Does patient have a history of HHC?: Yes (patient is currently under [facility] of IV potassium infusions daily via port)  Does patient currently have [place] [address]?: Yes ([facility] for IV infusion and port care)    Current Home Health Care  Type of Current Home Care Services: Nurse visit (weekly  port  care)  Current Home Health Agency[de-identified]  ([facility])  Current [facility] Provider[de-identified] PCP    Patient Information Continued  Income Source: Unemployed  Does patient have prescription coverage?: Yes  Within the past 12 months, you worried that your food would run out before you got the money to buy more : Sometimes true  Within the past 12 months, the food you bought just didn't last and you didn't have money to get more : Sometimes true  Food insecurity resource given?: Yes (Patient accepted information for food and community resources to assist with financial issues due to high medical bills)  Does patient have a history of substance abuse?: No      Means of Transportation  Means of Transport to Appts[de-identified] Drives Self  In the past 12 months, has lack of transportation kept you from meetings, work, or from getting things needed for daily living?: No  Was application for public transport provided?: N/A        DISCHARGE DETAILS:    Discharge planning discussed with[de-identified] patient via phone due to isolation  Freedom of Choice: Yes  Comments - Freedom of Choice: CM discussed Home Infusions with patient , patient stated she is under [facility]  care for IV potassium daily infusions with weekly nursing visits for port care  patient aware CM to reach out to [facility] to resume care when discharged  CM contacted family/caregiver?: No- see comments (not at this time patient answered all questions)  Were Treatment Team discharge recommendations reviewed with patient/caregiver?: Yes  Did patient/caregiver verbalize understanding of patient care needs?: Yes  Were patient/caregiver advised of the risks associated with not following Treatment Team discharge recommendations?: Yes    Contacts  Patient Contacts: Hugh Travis  Relationship to Patient[de-identified] Family  Reason/Outcome: Discharge [address]         Is the patient interested in [facility] at discharge?:  (patient is currently active wt [facility] for  IV potassium infusion and port care)      Other Referral/Resources/Interventions Provided:  Government Services[de-identified]  (provided [facility] resources for assistance)      Treatment Team Recommendation: Home, Other (with [facility] nursing)  Discharge Destination Plan[de-identified] Home ([facility] nursing)  Transport at Discharge : Family         Patient stated she is Active with [facility] for IV potassium infusions daily with weekly visits from nursing for port care  CM placed call to [address] [place] [address] for steps to  transition pt  services  back home once discharged from hospital     [facility]   [phone]    Noman Keita handles patient infusion/ patient            1600 CM spoke with physician on call at [address] [place] [address], he  Stated all they need is order to resume IV potassium infusion with dosage prior to hospital if unchanged       [facility] physician asked for new clinicals to be faxed with  IV order to [phone]  On discharge          CM to follow

## 2022-09-02 NOTE — ASSESSMENT & PLAN NOTE
Patient reports 2 months history of nausea vomiting and diarrhea  Reports intermittent nonbilious vomiting and persistent nausea  Also reports up to 8-10 liquid bowel movements daily  She saw her bariatric surgeon approximately 2 weeks ago and was referred to see a gastroenterologist   Has prior history of C diff in 2015  Await stool C diff toxin, ova parasites and enteric pathogen panel  P r n  Antiemetics  Continue with IV fluids  Unclear if persistent diarrhea is secondary to chronic pancreatitis versus other pathology    GI input appreciated

## 2022-09-02 NOTE — ASSESSMENT & PLAN NOTE
Patient presents with 1 day history of epigastric and left upper quadrant abdominal pain  Associated with nausea and nonbilious vomiting  Lipase elevated at 3021  Subsequently trending down  CT abdomen reviewed and unremarkable  Patient has history of cholecystectomy  Denies any alcohol use  Currently not on any medications which can cause pancreatitis  She does report prior history of bouts pancreatitis approximately 1 year ago and was maintained on Creon however has been off it for over 2 months  Continue with aggressive IV hydration  Follow up on lipase  Clear liquid diet for now  Gradually advanced as tolerated  P r n   Analgesics and antiemetics  GI input noted and appreciated

## 2022-09-02 NOTE — PLAN OF CARE
Problem: METABOLIC, FLUID AND ELECTROLYTES - ADULT  Goal: Electrolytes maintained within normal limits  Description: INTERVENTIONS:  - Monitor labs and assess patient for signs and symptoms of electrolyte imbalances monitor potassium levels  - Administer electrolyte replacement as ordered  - Monitor response to electrolyte replacements, including repeat lab results as appropriate  - Instruct patient on fluid and nutrition as appropriate  Outcome: Progressing  Goal: Fluid balance maintained  Description: INTERVENTIONS:  - Monitor labs   - Monitor I/O and WT  - Instruct patient on fluid and nutrition as appropriate  - Assess for signs & symptoms of volume excess or deficit  Outcome: Progressing     Problem: PAIN - ADULT  Goal: Verbalizes/displays adequate comfort level or baseline comfort level  Description: Interventions:  - Encourage patient to monitor pain and request assistance  - Assess pain using appropriate pain scale  - Administer analgesics based on type and severity of pain and evaluate response  - Implement non-pharmacological measures as appropriate and evaluate response  - Consider cultural and social influences on pain and pain management  - Notify physician/advanced practitioner if interventions unsuccessful or patient reports new pain  Outcome: Progressing     Problem: INFECTION - ADULT  Goal: Absence or prevention of progression during hospitalization  Description: INTERVENTIONS:  - Assess and monitor for signs and symptoms of infection  - Monitor lab/diagnostic results  - Monitor all insertion sites, i e  indwelling lines, tubes, and drains  - Monitor endotracheal if appropriate and nasal secretions for changes in amount and color  - [place] appropriate cooling/warming therapies per order  - Administer medications as ordered  - Instruct and encourage patient and family to use good hand hygiene technique  - Identify and instruct in appropriate isolation precautions for identified infection/condition  Outcome: Progressing  Goal: Absence of fever/infection during neutropenic period  Description: INTERVENTIONS:  - Monitor WBC    Outcome: Progressing     Problem: SAFETY ADULT  Goal: Patient will remain free of falls  Description: INTERVENTIONS:  - Educate patient/family on patient safety including physical limitations  - Instruct patient to call for assistance with activity   - Consult OT/PT to assist with strengthening/mobility   - Keep Call bell within reach  - Keep bed low and locked with side rails adjusted as appropriate  - Keep care items and personal belongings within reach  - Initiate and maintain comfort rounds  - Make Fall Risk Sign visible to staff  - Offer Toileting every   Hours, in advance of need  - Initiate/Maintain   alarm  - Obtain necessary fall risk management equipment:     - Apply yellow socks and bracelet for high fall risk patients  - Consider moving patient to room near nurses station  Outcome: Progressing  Goal: Maintain or return to baseline ADL function  Description: INTERVENTIONS:  -  Assess patient's ability to carry out ADLs; assess patient's baseline for ADL function and identify physical deficits which impact ability to perform ADLs (bathing, care of mouth/teeth, toileting, grooming, dressing, etc )  - Assess/evaluate cause of self-care deficits   - Assess range of motion  - Assess patient's mobility; develop plan if impaired  - Assess patient's need for assistive devices and provide as appropriate  - Encourage maximum independence but intervene and supervise when necessary  - Involve family in performance of ADLs  - Assess for home care needs following discharge   - Consider OT consult to assist with ADL evaluation and planning for discharge  - Provide patient education as appropriate  Outcome: Progressing  Goal: Maintains/Returns to pre admission functional level  Description: INTERVENTIONS:  - Perform BMAT or MOVE assessment daily    - Set and communicate daily mobility goal to care team and patient/family/caregiver  - Collaborate with rehabilitation services on mobility goals if consulted  - Perform Range of Motion   times a day  - Reposition patient every   hours  - Dangle patient   times a day  - Stand patient   times a day  - Ambulate patient   times a day  - Out of bed to chair   times a day   - Out of bed for meals     times a day  - Out of bed for toileting  - Record patient progress and toleration of activity level   Outcome: Progressing     Problem: DISCHARGE PLANNING  Goal: Discharge to home or other facility with appropriate resources  Description: INTERVENTIONS:  - Identify barriers to discharge w/patient and caregiver  - Arrange for needed discharge resources and transportation as appropriate  - Identify discharge learning needs (meds, wound care, etc )  - Arrange for interpretive services to assist at discharge as needed  - Refer to Case Management Department for coordinating discharge planning if the patient needs post-hospital services based on physician/advanced practitioner order or complex needs related to functional status, cognitive ability, or social support system  Outcome: Progressing     Problem: Knowledge Deficit  Goal: Patient/family/caregiver demonstrates understanding of disease process, treatment plan, medications, and discharge instructions  Description: Complete learning assessment and assess knowledge base    Interventions:  - Provide teaching at level of understanding  - Provide teaching via preferred learning methods  Outcome: Progressing

## 2022-09-02 NOTE — ASSESSMENT & PLAN NOTE
LFTs trending down today  Etiology unclear  Patient is status post cholecystectomy  Hepatitis panel negative  Follow-up on ultrasound with Dopplers  Iron panel unremarkable  Follow-up on autoimmune hepatitis panel  Trend LFTs in a m

## 2022-09-02 NOTE — PLAN OF CARE
Problem: METABOLIC, FLUID AND ELECTROLYTES - ADULT  Goal: Electrolytes maintained within normal limits  Description: INTERVENTIONS:  - Monitor labs and assess patient for signs and symptoms of electrolyte imbalances monitor potassium levels  - Administer electrolyte replacement as ordered  - Monitor response to electrolyte replacements, including repeat lab results as appropriate  - Instruct patient on fluid and nutrition as appropriate  9/2/2022 0403 by Lora Hernandez RN  Outcome: Progressing  9/2/2022 0403 by Lora Hernandez RN  Outcome: Progressing     Problem: PAIN - ADULT  Goal: Verbalizes/displays adequate comfort level or baseline comfort level  Description: Interventions:  - Encourage patient to monitor pain and request assistance  - Assess pain using appropriate pain scale  - Administer analgesics based on type and severity of pain and evaluate response  - Implement non-pharmacological measures as appropriate and evaluate response  - Consider cultural and social influences on pain and pain management  - Notify physician/advanced practitioner if interventions unsuccessful or patient reports new pain  Outcome: Progressing     Problem: INFECTION - ADULT  Goal: Absence or prevention of progression during hospitalization  Description: INTERVENTIONS:  - Assess and monitor for signs and symptoms of infection  - Monitor lab/diagnostic results  - Monitor all insertion sites, i e  indwelling lines, tubes, and drains  - Monitor endotracheal if appropriate and nasal secretions for changes in amount and color  - [place] appropriate cooling/warming therapies per order  - Administer medications as ordered  - Instruct and encourage patient and family to use good hand hygiene technique  - Identify and instruct in appropriate isolation precautions for identified infection/condition  Outcome: Progressing

## 2022-09-03 VITALS
SYSTOLIC BLOOD PRESSURE: 91 MMHG | OXYGEN SATURATION: 98 % | TEMPERATURE: 98.8 F | BODY MASS INDEX: 21.48 KG/M2 | HEART RATE: 87 BPM | DIASTOLIC BLOOD PRESSURE: 53 MMHG | HEIGHT: 63 IN | WEIGHT: 121.25 LBS | RESPIRATION RATE: 12 BRPM

## 2022-09-03 LAB
ACTIN IGG SERPL-ACNC: 2 UNITS (ref 0–19)
ALBUMIN SERPL BCP-MCNC: 2.1 G/DL (ref 3.5–5)
ALP SERPL-CCNC: 115 U/L (ref 46–116)
ALT SERPL W P-5'-P-CCNC: 70 U/L (ref 12–78)
ANION GAP SERPL CALCULATED.3IONS-SCNC: 6 MMOL/L (ref 4–13)
AST SERPL W P-5'-P-CCNC: 57 U/L (ref 5–45)
BILIRUB SERPL-MCNC: 0.43 MG/DL (ref 0.2–1)
BUN SERPL-MCNC: 4 MG/DL (ref 5–25)
C DIFF TOX GENS STL QL NAA+PROBE: NEGATIVE
C PEPTIDE SERPL-MCNC: 1.4 NG/ML (ref 1.1–4.4)
CALCIUM ALBUM COR SERPL-MCNC: 8.1 MG/DL (ref 8.3–10.1)
CALCIUM SERPL-MCNC: 6.6 MG/DL (ref 8.3–10.1)
CAMPYLOBACTER DNA SPEC NAA+PROBE: NORMAL
CHLORIDE SERPL-SCNC: 107 MMOL/L (ref 96–108)
CO2 SERPL-SCNC: 24 MMOL/L (ref 21–32)
CREAT SERPL-MCNC: 0.76 MG/DL (ref 0.6–1.3)
GFR SERPL CREATININE-BSD FRML MDRD: 99 ML/MIN/1.73SQ M
GLUCOSE SERPL-MCNC: 78 MG/DL (ref 65–140)
INR PPP: 0.99 (ref 0.84–1.19)
LIPASE SERPL-CCNC: 181 U/L (ref 73–393)
POTASSIUM SERPL-SCNC: 2.9 MMOL/L (ref 3.5–5.3)
PROT SERPL-MCNC: 4.4 G/DL (ref 6.4–8.4)
PROTHROMBIN TIME: 13.2 SECONDS (ref 11.6–14.5)
SALMONELLA DNA SPEC QL NAA+PROBE: NORMAL
SHIGA TOXIN STX GENE SPEC NAA+PROBE: NORMAL
SHIGELLA DNA SPEC QL NAA+PROBE: NORMAL
SODIUM SERPL-SCNC: 137 MMOL/L (ref 135–147)

## 2022-09-03 PROCEDURE — 80053 COMPREHEN METABOLIC PANEL: CPT | Performed by: INTERNAL MEDICINE

## 2022-09-03 PROCEDURE — C9113 INJ PANTOPRAZOLE SODIUM, VIA: HCPCS | Performed by: INTERNAL MEDICINE

## 2022-09-03 PROCEDURE — 83690 ASSAY OF LIPASE: CPT | Performed by: INTERNAL MEDICINE

## 2022-09-03 PROCEDURE — 85610 PROTHROMBIN TIME: CPT | Performed by: INTERNAL MEDICINE

## 2022-09-03 PROCEDURE — 99239 HOSP IP/OBS DSCHRG MGMT >30: CPT | Performed by: INTERNAL MEDICINE

## 2022-09-03 RX ORDER — LOPERAMIDE HYDROCHLORIDE 2 MG/1
2 TABLET ORAL 4 TIMES DAILY PRN
Qty: 30 TABLET | Refills: 0 | Status: SHIPPED | OUTPATIENT
Start: 2022-09-03

## 2022-09-03 RX ORDER — ACETAMINOPHEN 325 MG/1
650 TABLET ORAL EVERY 6 HOURS PRN
Qty: 20 TABLET | Refills: 0 | Status: SHIPPED | OUTPATIENT
Start: 2022-09-03 | End: 2022-09-13

## 2022-09-03 RX ORDER — POTASSIUM CHLORIDE 29.8 MG/ML
40 INJECTION INTRAVENOUS ONCE
Status: DISCONTINUED | OUTPATIENT
Start: 2022-09-03 | End: 2022-09-03

## 2022-09-03 RX ORDER — POTASSIUM CHLORIDE 14.9 MG/ML
20 INJECTION INTRAVENOUS
Status: COMPLETED | OUTPATIENT
Start: 2022-09-03 | End: 2022-09-03

## 2022-09-03 RX ADMIN — ONDANSETRON 4 MG: 2 INJECTION INTRAMUSCULAR; INTRAVENOUS at 06:24

## 2022-09-03 RX ADMIN — THIAMINE HYDROCHLORIDE 100 MG: 100 INJECTION, SOLUTION INTRAMUSCULAR; INTRAVENOUS at 10:00

## 2022-09-03 RX ADMIN — MORPHINE SULFATE 2 MG: 2 INJECTION, SOLUTION INTRAMUSCULAR; INTRAVENOUS at 03:27

## 2022-09-03 RX ADMIN — PANTOPRAZOLE SODIUM 40 MG: 40 INJECTION, POWDER, FOR SOLUTION INTRAVENOUS at 09:53

## 2022-09-03 RX ADMIN — POTASSIUM CHLORIDE 20 MEQ: 14.9 INJECTION, SOLUTION INTRAVENOUS at 09:53

## 2022-09-03 RX ADMIN — POTASSIUM CHLORIDE 20 MEQ: 14.9 INJECTION, SOLUTION INTRAVENOUS at 14:00

## 2022-09-03 RX ADMIN — MORPHINE SULFATE 2 MG: 2 INJECTION, SOLUTION INTRAMUSCULAR; INTRAVENOUS at 06:23

## 2022-09-03 RX ADMIN — AMILORIDE HYDROCLORIDE 5 MG: 5 TABLET ORAL at 09:58

## 2022-09-03 RX ADMIN — MORPHINE SULFATE 2 MG: 2 INJECTION, SOLUTION INTRAMUSCULAR; INTRAVENOUS at 12:04

## 2022-09-03 RX ADMIN — SODIUM CHLORIDE, SODIUM LACTATE, POTASSIUM CHLORIDE, AND CALCIUM CHLORIDE 250 ML/HR: .6; .31; .03; .02 INJECTION, SOLUTION INTRAVENOUS at 04:18

## 2022-09-03 RX ADMIN — SODIUM CHLORIDE, SODIUM LACTATE, POTASSIUM CHLORIDE, AND CALCIUM CHLORIDE 250 ML/HR: .6; .31; .03; .02 INJECTION, SOLUTION INTRAVENOUS at 09:52

## 2022-09-03 RX ADMIN — POTASSIUM CHLORIDE 40 MEQ: 29.8 INJECTION, SOLUTION INTRAVENOUS at 11:55

## 2022-09-03 NOTE — NURSING NOTE
Patient monitored on tele for potassium lauro infusions, no events  Positive blood return from right port a cath on discharge after completion  of potassium ryders   Patient ready to be discharged

## 2022-09-03 NOTE — UTILIZATION REVIEW
Inpatient Admission Authorization Request   NOTIFICATION OF INPATIENT ADMISSION/INPATIENT AUTHORIZATION REQUEST   SERVICING FACILITY:   [address] [place] [address]  [facility] [address]  Tax ID: [id number]  NPI: [id number]  Place of [facility]patient [facility]  [address]  Place of Service Code: 24     ATTENDING PROVIDER:  Attending Name and NPI#: Misael Fox [place] [[id number]]  Address: Jose Padilla [address] [place] [address], [address]  Phone: [phone]     UTILIZATION REVIEW CONTACT:  Marisol Figueredo, Utilization   Network Utilization Review Department  Phone: [phone]  Fax [phone]  Email: [email or web address]     PHYSICIAN ADVISORY SERVICES:  FOR JIDO-QZ-GMYU REVIEW - MEDICAL NECESSITY DENIAL  Phone: [phone]  Fax: [phone]  Email: [email or web address]     TYPE OF REQUEST:  Inpatient Status     ADMISSION INFORMATION:  ADMISSION DATE/TIME: 9/1/22 11:27 AM  PATIENT DIAGNOSIS CODE/DESCRIPTION:  Acute pancreatitis [K85 90]  Left lower quadrant abdominal pain [R10 32]  DISCHARGE DATE/TIME: No discharge date for patient encounter  IMPORTANT INFORMATION:  Please contact Nancy Cummings directly with any questions or concerns regarding this request  Department voicemails are confidential     Send requests for admission clinical reviews, concurrent reviews, approvals, and administrative denials due to lack of clinical to fax [phone]

## 2022-09-03 NOTE — ASSESSMENT & PLAN NOTE
LFTs transiently increase during admission and subsequently trended down  Etiology unclear  Patient is status post cholecystectomy  Hepatitis panel negative  Abdominal and close sound with Doppler was unremarkable  Iron panel unremarkable  Follow-up on autoimmune hepatitis panel-pending on discharge

## 2022-09-03 NOTE — ASSESSMENT & PLAN NOTE
Patient reports 2 months history of nausea vomiting and diarrhea  Reports intermittent nonbilious vomiting and persistent nausea  Also reports up to 8-10 liquid bowel movements daily  She saw her bariatric surgeon approximately 2 weeks ago and was referred to see a gastroenterologist   Has prior history of C diff in 2015   C diff toxin and enteric pathogen panel were negative  Stool ova parasite and fecal calprotectin were  pending on discharge  Diarrhea has resolved since hospitalization  She was tolerating diet without  vomiting   Recommended to continue P r n  Antiemetics for nausea  Encourage oral hydration  Unclear if persistent diarrhea is secondary to chronic pancreatitis versus other pathology      Patient recommended to follow-up with EP GI on discharge to consider getting restarted on pancreatic enzyme supplementation

## 2022-09-03 NOTE — DISCHARGE SUMMARY
[address]  Discharge- Kaitlin Blanca 1985, 44 y o  female MRN: [id number]  Unit/Bed#: -01 Encounter: [id number]  Primary Care Provider: Pierre Kaminski MD   Date and time admitted to hospital: 9/1/2022  8:46 AM    * Acute pancreatitis  Assessment & Plan  Patient presents with 1 day history of epigastric and left upper quadrant abdominal pain  Associated with nausea and nonbilious vomiting  Lipase elevated at 3021  Subsequently trended down to normal  CT abdomen reviewed and unremarkable for pancreatitis  Patient is status post cholecystectomy  Denies any alcohol use  Currently not on any medications which can cause pancreatitis  She does report prior history of bouts pancreatitis approximately 1 year ago and was maintained on Creon however has been off it for over 2 months  Patient was maintained on aggressive IV hydration  She was started on clear liquid diet which she continued to tolerate with decreased abdominal discomfort  Diet was subsequently advanced to low-fat soft diet  Patient continued to tolerated well and was deemed stable for discharge with close outpatient GI follow-up  P r n  Analgesics and antiemetics per recommended on discharge  Close outpatient GI follow-up was recommended              Abnormal LFTs  Assessment & Plan  LFTs transiently increase during admission and subsequently trended down  Etiology unclear  Patient is status post cholecystectomy  Hepatitis panel negative  Abdominal and close sound with Doppler was unremarkable  Iron panel unremarkable  Follow-up on autoimmune hepatitis panel-pending on discharge      Nausea vomiting and diarrhea  Assessment & Plan  Patient reports 2 months history of nausea vomiting and diarrhea  Reports intermittent nonbilious vomiting and persistent nausea  Also reports up to 8-10 liquid bowel movements daily    She saw her bariatric surgeon approximately 2 weeks ago and was referred to see a gastroenterologist  Has prior history of C diff in 2015   C diff toxin and enteric pathogen panel were negative  Stool ova parasite and fecal calprotectin were  pending on discharge  Diarrhea has resolved since hospitalization  She was tolerating diet without  vomiting   Recommended to continue P r n  Antiemetics for nausea  Encourage oral hydration  Unclear if persistent diarrhea is secondary to chronic pancreatitis versus other pathology  Patient recommended to follow-up with [facility] GI on discharge to consider getting restarted on pancreatic enzyme supplementation      Hypokalemia  Assessment & Plan  Patient has history of chronic hypokalemia for which she follows up with Nephrology   She has a port in place and gets daily infusion of 80 mEq of IV potassium  Will continue during hospitalization  Follow-up BMP periodically per discretion of primary nephrologist        Medical Problems             Resolved Problems  Date Reviewed: 7/8/2022   None               Discharging Physician / Practitioner: Sheryle Malone, MD  PCP: Tye Castillo MD  Admission Date:   Admission Orders (From admission, onward)     Ordered        09/01/22 1127  [facility]  Once                      Discharge Date: 09/03/22    Consultations During Hospital Stay:  · Gastroenterology    Procedures Performed:   · CT scan of the abdomen and pelvis no findings suggestive of pancreatitis  Shots back segment small bowel with small bowel intussusception in the left lower quadrant which was transient  Fluid-filled colon with some rectal wall thickening  · Abdominal ultrasound unremarkable    Significant Findings / Test Results:   · Stool C diff toxin enteric pathogen panel negative    Incidental Findings:   · See above    Test Results Pending at Discharge (will require follow up):    · Fecal calprotectin  · Stool Giardia and ova parasite  · Anti smooth muscle antibody  · ANA LILIA  · Zinc  · Ceruloplasmin     Outpatient Tests Requested:  · Follow-up with [facility]    Complications:  None    Reason for Admission:  Abdominal pain    Hospital Course: Peg Kimbrough is a 44 y o  female patient who originally presented to the hospital on 9/1/2022 due to left lower quadrant abdominal pain  Patient also reported chronic history of intermittent diarrhea nausea and vomiting for over 2 months  She status post gastric bypass in 2014  Patient also has history of chronic hypokalemia for which she gets infusion of 80 mainly clearance of potassium via right chest wall port daily  Patient lipase was elevated on admission however CT scan did not reveal any findings suggestive of pancreatitis  She is status post cholecystectomy  She was managed conservatively with NPO, IV hydration and serial exam   Subsequently her lipase normalized  Diet was gradually advanced which she continued to tolerate well  For the chronic diarrhea patient had stool C diff toxin and enteric pathogen panel sent which were negative  Fecal calprotectin waspending on discharge    Of note she had transient elevation of LFTs for which workup was sent and abdominal ultrasound, hepatitis panel,, iron panel were unremarkable  Serum ceruloplasmin and anti smooth muscle antibody were pending on discharge patient was recommended discharge on p r n  Antiemetics, low-fat diet with close outpatient follow-up with her primary gastroenterologist     Please see above list of diagnoses and related plan for additional information  Condition at Discharge: stable    Discharge Day Visit / Exam:   Subjective:  Patient seen and examined at bedside  Reports improved abdominal pain  Denies any nausea vomiting or diarrhea  Tolerating clear liquid diet well  Diet was advanced to soft solid low-fat food which she continued to tolerate well      Vitals: Blood Pressure: 91/53 (09/03/22 0750)  Pulse: 87 (09/03/22 0750)  Temperature: 98 8 °F (37 1 °C) (09/03/22 0750)  Temp Source: Temporal (09/01/22 0851)  Respirations: 12 (09/03/22 0750)  Height: 5' 3" (160 cm) (09/01/22 0851)  Weight - Scale: 55 kg (121 lb 4 1 oz) (09/01/22 0851)  SpO2: 98 % (09/03/22 0750)  Exam:   Physical Exam  Constitutional:       General: She is not in acute distress  HENT:      Head: Normocephalic and atraumatic  Nose: Nose normal       Mouth/Throat:      Mouth: Mucous membranes are moist    Eyes:      Extraocular Movements: Extraocular movements intact  Pupils: Pupils are equal, round, and reactive to light  Cardiovascular:      Rate and Rhythm: Normal rate and regular rhythm  Pulses: Normal pulses  Pulmonary:      Effort: Pulmonary effort is normal       Breath sounds: Normal breath sounds  Abdominal:      Comments: Diminished left lower quadrant tenderness  No rebound or guarding  Bowel sounds audible   Musculoskeletal:         General: No swelling or tenderness  Cervical back: Neck supple  Right lower leg: No edema  Left lower leg: No edema  Skin:     Coloration: Skin is not jaundiced or pale  Neurological:      General: No focal deficit present  Mental Status: She is alert and oriented to person, place, and time  Mental status is at baseline  Psychiatric:         Mood and Affect: Mood normal           Discussion with Family: Patient declined call to   Discharge instructions/Information to patient and family:   See after visit summary for information provided to patient and family  Provisions for Follow-Up Care:  See after visit summary for information related to follow-up care and any pertinent home health orders  Disposition:   Home    Planned Readmission: No     Discharge Statement:  I spent 35 minutes discharging the patient  This time was spent on the day of discharge  I had direct contact with the patient on the day of discharge   Greater than 50% of the total time was spent examining patient, answering all patient questions, arranging and discussing plan of care with patient as well as directly providing post-discharge instructions  Additional time then spent on discharge activities  Discharge Medications:  See after visit summary for reconciled discharge medications provided to patient and/or family        **Please Note: This note may have been constructed using a voice recognition system**

## 2022-09-03 NOTE — CASE MANAGEMENT
Case Management Discharge Planning Note    Patient name Arlene Bowen  Location /-72 MRN [id number]  : 1985 Date 9/3/2022       Current Admission Date: 2022  Current Admission Diagnosis:Acute pancreatitis   Patient Active Problem List    Diagnosis Date Noted    Acute pancreatitis 2022    Perianal abscess 2022    Episode of shaking     Psychogenic nonepileptic spells 2022    History of anxiety 2022    Intractable vomiting 2022    Acute hyponatremia 2022    Iron deficiency 2021    Other hemorrhoids     Open wound / skin tear of right chest wall 2021    Cellulitis of chest wall 2021    Chronic pancreatitis ([address] ) 2021    Acute on chronic abdominal pain 2021    Abnormal LFTs 2021    Gas pain 2021    Left lower quadrant abdominal pain 2020    Middle ear effusion 2020    Normal anion gap metabolic acidosis 7032    Nausea vomiting and diarrhea 2019    Vertigo 2019    Stress fracture of right foot with routine healing 2019    Right ovarian cyst 2019    Chest pain 2019    Other intestinal malabsorption 10/05/2018    Gastric bypass status for obesity 10/02/2018    GERD (gastroesophageal reflux disease) 2018    Paresthesia of both lower extremities 08/15/2018    Paresthesias 08/15/2018    Fatigue 2018    Elevated CPK 2018    Vitamin B12 deficiency 2018    Cervical lymphadenopathy 2018    Anemia 07/10/2018    Vitamin D insufficiency 07/10/2018    Hypocalcemia 2018    Hypophosphatemia 2018    Hypokalemia 2018    History of gastric bypass 2018    Migraine without aura and without status migrainosus, not intractable 2018    Left-sided weakness 2018    B12 deficiency 2016    WAGNER (obstructive sleep apnea) 2014    Migraine 2014      LOS (days): 2  Geometric Mean LOS (GMLOS) (days): 3 10  Days to GMLOS:1     OBJECTIVE:  Risk of Unplanned Readmission Score: 24 04         Current admission status: Inpatient   Preferred Pharmacy:   [facility], [address] [address] [address]  Phone: [phone] Fax: [phone] [address] [facility]  [facility] [address]  Phone: [phone] Fax: [phone]    Primary Care Provider: Shayy Toth MD    Primary Insurance: [employer]  Secondary Insurance:     DISCHARGE DETAILS:    Patient stable for discharge today    [facility] yesterday  pharmist stated they made delivery of IV supplies to patient home 9/2/22  RODOLFO spoke with patient and she confirmed her spouse received the shipment yesteday and they are set for discharge today  Patient will reach out to her home nurse when she gets home    Pharamist asked for resume IV Potassium order post hospital and  MD progress note faxed to [phone]      RODOLFO faxed both today

## 2022-09-03 NOTE — ASSESSMENT & PLAN NOTE
Patient has history of chronic hypokalemia for which she follows up with Nephrology   She has a port in place and gets daily infusion of 80 mEq of IV potassium  Will continue during hospitalization    Follow-up BMP periodically per discretion of primary nephrologist

## 2022-09-03 NOTE — ASSESSMENT & PLAN NOTE
Patient presents with 1 day history of epigastric and left upper quadrant abdominal pain  Associated with nausea and nonbilious vomiting  Lipase elevated at 3021  Subsequently trended down to normal  CT abdomen reviewed and unremarkable for pancreatitis  Patient is status post cholecystectomy  Denies any alcohol use  Currently not on any medications which can cause pancreatitis  She does report prior history of bouts pancreatitis approximately 1 year ago and was maintained on Creon however has been off it for over 2 months  Patient was maintained on aggressive IV hydration  She was started on clear liquid diet which she continued to tolerate with decreased abdominal discomfort  Diet was subsequently advanced to low-fat soft diet  Patient continued to tolerated well and was deemed stable for discharge with close outpatient GI follow-up  P r n   Analgesics and antiemetics per recommended on discharge  Close outpatient GI follow-up was recommended

## 2022-09-03 NOTE — NURSING NOTE
Reviewed discharge instructions, med rec, follow up appointments, worsening s/s when to call pcp or return to ER

## 2022-09-04 LAB — RYE IGE QN: NEGATIVE

## 2022-09-05 LAB — ZINC SERPL-MCNC: 47 UG/DL (ref 44–115)

## 2022-09-06 ENCOUNTER — APPOINTMENT (OUTPATIENT)
Dept: LAB | Facility: HOSPITAL | Age: 37
End: 2022-09-06
Attending: INTERNAL MEDICINE
Payer: COMMERCIAL

## 2022-09-06 DIAGNOSIS — E87.6 HYPOKALEMIA: ICD-10-CM

## 2022-09-06 LAB
ANION GAP SERPL CALCULATED.3IONS-SCNC: 11 MMOL/L (ref 4–13)
BUN SERPL-MCNC: 9 MG/DL (ref 5–25)
CALCIUM SERPL-MCNC: 8.3 MG/DL (ref 8.3–10.1)
CALPROTECTIN STL-MCNT: 347 UG/G (ref 0–120)
CHLORIDE SERPL-SCNC: 106 MMOL/L (ref 96–108)
CO2 SERPL-SCNC: 21 MMOL/L (ref 21–32)
CREAT SERPL-MCNC: 1.02 MG/DL (ref 0.6–1.3)
GFR SERPL CREATININE-BSD FRML MDRD: 69 ML/MIN/1.73SQ M
GLUCOSE SERPL-MCNC: 81 MG/DL (ref 65–140)
POTASSIUM SERPL-SCNC: 4.2 MMOL/L (ref 3.5–5.3)
SODIUM SERPL-SCNC: 138 MMOL/L (ref 135–147)

## 2022-09-06 PROCEDURE — 36415 COLL VENOUS BLD VENIPUNCTURE: CPT

## 2022-09-06 PROCEDURE — 80048 BASIC METABOLIC PNL TOTAL CA: CPT

## 2022-09-06 NOTE — UTILIZATION REVIEW
Notification of Discharge   This is a Notification of Discharge from our facility [address]  Please be advised that this patient has been discharge from our facility  Below you will find the admission and discharge date and time including the patients disposition  UTILIZATION REVIEW CONTACT:  [address] [place]  Utilization   Network Utilization Review Department  Phone: [phone] x carefully listen to the prompts  All voicemails are confidential   Email: [email or web address]     PHYSICIAN ADVISORY SERVICES:  FOR [id number] REVIEW - MEDICAL NECESSITY DENIAL  Phone: [phone]  Fax: [phone]  Email: [email or web address]     PRESENTATION DATE: 9/1/2022  8:46 AM  OBERVATION ADMISSION DATE:  INPATIENT ADMISSION DATE: 9/1/22 11:27 AM   DISCHARGE DATE: 9/3/2022  4:26 PM  DISPOSITION: Home/Self Care Home/Self Care      IMPORTANT INFORMATION:  Send all requests for admission clinical reviews, approved or denied determinations and any other requests to dedicated fax number below belonging to the campus where the patient is receiving treatment   List of dedicated fax numbers:  [address] DENIALS (Administrative/Medical Necessity) [phone]   [address] (Maternity/NICU/Pediatrics) [phone]   [facility] [phone]   130 [facility] [phone]   [address] [place] [address] [phone]   [address] [place] [address] [address] [place] [address],[address] [phone]   [facility]  [phone]   [address] [place], [place] [address] [facility] 1000 [facility] [phone]

## 2022-09-07 ENCOUNTER — TELEPHONE (OUTPATIENT)
Dept: NEPHROLOGY | Facility: CLINIC | Age: 37
End: 2022-09-07

## 2022-09-07 LAB — PROINSULIN SERPL-SCNC: 3 PMOL/L (ref 0–10)

## 2022-09-07 NOTE — TELEPHONE ENCOUNTER
I do not know what this means, this is main we need to simply acknowledge a resumption of her care, or is there an order form that we need to fill out?

## 2022-09-07 NOTE — TELEPHONE ENCOUNTER
Called [facility] spoke with pharmacist Katya Mora and came him a verbal to continue the potassium infusions per Dr Lakshmi Cisneros

## 2022-09-08 LAB
G LAMBLIA AG STL QL IA: NEGATIVE
O+P STL CONC: NORMAL

## 2022-09-15 NOTE — ASSESSMENT & PLAN NOTE
- Phosphorus of 2 6 today  - Can be due to vitamin D deficiency (Vitamin D 25 Hydroxy of 14 8 on 11/27)  - Ordered ergocalciferol 50,000 unit today (replace weekly)  - Na-K phosphate packets
· Check an overnight sleep screen  · Outpatient sleep study and follow-up with her PCP in regards to this matter
· Continue ergocalciferol replacement therapy  · Outpatient follow-up with her PCP in regards to this matter
· Continue ergocalciferol replacement therapy  · Outpatient follow-up with her PCP in regards to this matter
· Ergocalciferol replacement therapy, ordered 50,000 unit today 11/28 (replace weekly)  · Outpatient follow-up with her PCP in regards to this matter
· History of gastric bypass in 2014  · Outpatient follow-up with Bariatric Surgery
· Place in 23-hour observation, med/surg level of care with telemetry monitoring in the setting of hypokalemia  · The patient has been taking 5 doses of 40 meQ KCL supplementation daily for a total of 200 meQ potassium chloride oral supplementation daily for the last 3 days  · Replete with IV potassium chloride 20 meQ x 3 doses now, and initiate NSS IV fluids with 40 meQ KCl at 125 ml/hr  · Check labs now  · Check an EKG  · Consult Nephrology  · Possible outpatient referral for the patient to a higher level of care/specialty center to see a hypokalemia expert  · Follow the potassium level and magnesium level closely
· Place in 23-hour observation, med/surg level of care with telemetry monitoring in the setting of hypokalemia  · The patient has been taking 5 doses of 40 meQ KCL supplementation daily for a total of 200 meQ potassium chloride oral supplementation daily for the last 3 days  · Replete with IV potassium chloride 20 meQ x 3 doses now, and initiate NSS IV fluids with 40 meQ KCl at 125 ml/hr  · Check labs now  · Check an EKG  · Consult Nephrology  · Possible outpatient referral for the patient to a higher level of care/specialty center to see a hypokalemia expert  · Follow the potassium level and magnesium level closely    11/27/18:  - K+ today is 2 9, increased slightly from 2 4 yesterday   - Patient still complains of perioral numbness and bilateral paresthesia up to knee  - Today will repeat IV potassium chloride 20 meQ x 3 doses and repeat BMP at 4pm today to follow up K+ level  - Continue with NS 0 9 IVF with 40 mEq KCl @ 125 cc/hr  - No EKG changes on 11/26  - Consider obtaining urine K+ to Cr ratio to determine hypokalemia due to extrarenal vs  Renal K+ loss
· Place in 23-hour observation, med/surg level of care with telemetry monitoring in the setting of hypokalemia  · The patient has been taking 5 doses of 40 meQ KCL supplementation daily for a total of 200 meQ potassium chloride oral supplementation daily for the last 3 days  · Replete with IV potassium chloride 20 meQ x 3 doses now, and initiate NSS IV fluids with 40 meQ KCl at 125 ml/hr  · Check labs now  · Check an EKG  · Consult Nephrology  · Possible outpatient referral for the patient to a higher level of care/specialty center to see a hypokalemia expert  · Follow the potassium level and magnesium level closely    11/27/18:  - K+ today is 2 9, increased slightly from 2 4 yesterday   - Patient still complains of perioral numbness and bilateral paresthesia up to knee  - Today will repeat IV potassium chloride 20 meQ x 3 doses and repeat BMP at 4pm today to follow up K+ level  - Continue with NS 0 9 IVF with 40 mEq KCl @ 125 cc/hr  - No EKG changes on 11/26  - Consider obtaining urine K+ to Cr ratio to determine hypokalemia due to extrarenal vs  Renal K+ loss    11/28/18:   - K+ today is 3 3, compared to yesterday of 3 6 and 2 9  - Nephrology consult appreciated  Recommended salt tablet 1g PO BID to help reabsorb K+   Hypokalemia most likely due to malabsorption from hx of gastric bypass in 2014   - Continue with NS 0 9 IVF with 40 mEq KCl @ 125 cc/hr  - KCl 40mEq PO QID + KCl 20 mEq IVPB Q2H  - Magnesium tab 400mg PO BID
· Secondary to severe hypokalemia  · Paresthesias symptoms correlate with her hypokalemia
· Secondary to severe hypokalemia  · Paresthesias symptoms correlate with her hypokalemia  · Patient still has paresthesias in bilateral LLE today (11/27)
· Secondary to severe hypokalemia  · Paresthesias symptoms correlate with her hypokalemia  · Patient still has paresthesias in bilateral LLE today (11/28) but perioral numbness resolved
Alert and oriented to person, place and time

## 2022-09-19 ENCOUNTER — APPOINTMENT (OUTPATIENT)
Dept: LAB | Facility: HOSPITAL | Age: 37
End: 2022-09-19
Attending: INTERNAL MEDICINE
Payer: COMMERCIAL

## 2022-09-19 DIAGNOSIS — E87.6 HYPOKALEMIA: Primary | ICD-10-CM

## 2022-09-19 LAB
ANION GAP SERPL CALCULATED.3IONS-SCNC: 13 MMOL/L (ref 4–13)
BUN SERPL-MCNC: 11 MG/DL (ref 5–25)
CALCIUM SERPL-MCNC: 8.4 MG/DL (ref 8.3–10.1)
CHLORIDE SERPL-SCNC: 105 MMOL/L (ref 96–108)
CO2 SERPL-SCNC: 21 MMOL/L (ref 21–32)
CREAT SERPL-MCNC: 0.95 MG/DL (ref 0.6–1.3)
GFR SERPL CREATININE-BSD FRML MDRD: 76 ML/MIN/1.73SQ M
GLUCOSE SERPL-MCNC: 81 MG/DL (ref 65–140)
POTASSIUM SERPL-SCNC: 3.6 MMOL/L (ref 3.5–5.3)
SODIUM SERPL-SCNC: 139 MMOL/L (ref 135–147)

## 2022-09-19 PROCEDURE — 80048 BASIC METABOLIC PNL TOTAL CA: CPT

## 2022-09-19 PROCEDURE — 36415 COLL VENOUS BLD VENIPUNCTURE: CPT

## 2022-09-23 ENCOUNTER — TELEPHONE (OUTPATIENT)
Dept: NEPHROLOGY | Facility: CLINIC | Age: 37
End: 2022-09-23

## 2022-09-23 NOTE — TELEPHONE ENCOUNTER
Devin Marks called from [facility], patient missed her appointment today to get her dressing for her port changed  Said she called pt and let her know to go to the emergency room or to another nurse at [facility] to get it changed  Just an FYI

## 2022-10-03 ENCOUNTER — APPOINTMENT (OUTPATIENT)
Dept: LAB | Facility: HOSPITAL | Age: 37
End: 2022-10-03
Attending: INTERNAL MEDICINE
Payer: COMMERCIAL

## 2022-10-14 ENCOUNTER — HOSPITAL ENCOUNTER (OUTPATIENT)
Dept: RADIOLOGY | Facility: HOSPITAL | Age: 37
End: 2022-10-14
Attending: STUDENT IN AN ORGANIZED HEALTH CARE EDUCATION/TRAINING PROGRAM
Payer: COMMERCIAL

## 2022-10-14 DIAGNOSIS — K56.1 INTUSSUSCEPTION (HCC): ICD-10-CM

## 2022-10-14 PROCEDURE — 74250 X-RAY XM SM INT 1CNTRST STD: CPT

## 2022-10-18 ENCOUNTER — APPOINTMENT (OUTPATIENT)
Dept: LAB | Facility: HOSPITAL | Age: 37
End: 2022-10-18
Payer: COMMERCIAL

## 2022-10-19 ENCOUNTER — TELEPHONE (OUTPATIENT)
Dept: NEPHROLOGY | Facility: CLINIC | Age: 37
End: 2022-10-19

## 2022-10-20 ENCOUNTER — TELEPHONE (OUTPATIENT)
Dept: NEPHROLOGY | Facility: CLINIC | Age: 37
End: 2022-10-20

## 2022-10-20 NOTE — TELEPHONE ENCOUNTER
Call from Aureliano Leonardo at [facility]  Would like to know if you want him to send an extra bag to patient  Should she  do back to back bags since potassium was at 3 4?

## 2022-10-24 ENCOUNTER — APPOINTMENT (OUTPATIENT)
Dept: LAB | Facility: HOSPITAL | Age: 37
End: 2022-10-24
Payer: COMMERCIAL

## 2022-10-24 ENCOUNTER — TELEPHONE (OUTPATIENT)
Dept: NEPHROLOGY | Facility: CLINIC | Age: 37
End: 2022-10-24

## 2022-10-24 DIAGNOSIS — E87.6 HYPOKALEMIA: ICD-10-CM

## 2022-10-24 LAB
ANION GAP SERPL CALCULATED.3IONS-SCNC: 8 MMOL/L (ref 4–13)
BUN SERPL-MCNC: 9 MG/DL (ref 5–25)
CALCIUM SERPL-MCNC: 8 MG/DL (ref 8.3–10.1)
CHLORIDE SERPL-SCNC: 103 MMOL/L (ref 96–108)
CO2 SERPL-SCNC: 25 MMOL/L (ref 21–32)
CREAT SERPL-MCNC: 0.99 MG/DL (ref 0.6–1.3)
GFR SERPL CREATININE-BSD FRML MDRD: 72 ML/MIN/1.73SQ M
GLUCOSE SERPL-MCNC: 96 MG/DL (ref 65–140)
POTASSIUM SERPL-SCNC: 2.7 MMOL/L (ref 3.5–5.3)
SODIUM SERPL-SCNC: 136 MMOL/L (ref 135–147)

## 2022-10-24 PROCEDURE — 80048 BASIC METABOLIC PNL TOTAL CA: CPT

## 2022-10-24 PROCEDURE — 36415 COLL VENOUS BLD VENIPUNCTURE: CPT

## 2022-10-25 NOTE — TELEPHONE ENCOUNTER
Replied to patients message in "My Chart"     Reza Hurley,      Our medical assistant tried reaching you yesterday afternoon  Dr Delaney Patient would like you to take an extra 40 of KCl through the IV  Do you need us to contact the pharmacy? I will let him know you have been having symptoms for the past three days        He also said we should repeat this on Wednesday or Thursday      Please let me know if you have any questions      Alia

## 2022-10-26 ENCOUNTER — TELEPHONE (OUTPATIENT)
Dept: NEPHROLOGY | Facility: CLINIC | Age: 37
End: 2022-10-26

## 2022-10-26 NOTE — TELEPHONE ENCOUNTER
----- Message from Darryl Hirsch DO sent at 10/26/2022  2:27 PM EDT -----  Regarding: FW: Blood work  Can you please contact the patient by phone to see how she is doing    ----- Message -----  From: Artie Rain MA  Sent: 10/25/2022   8:34 AM EDT  To: Darryl Hirsch DO  Subject: FW: Blood work                                     ----- Message -----  From: Aylin Pierre  Sent: 10/25/2022   7:53 AM EDT  To: [facility] [place] [facility]  Subject: Blood work                                       Good morning  I saw my potassium yesterday was 2 7  I've been having symptoms for 3 days now  I wanted to see if you just wanted me to infuse extra bags or what the plan is  Thank you    Elana Carrasquillo

## 2022-11-07 ENCOUNTER — APPOINTMENT (OUTPATIENT)
Dept: LAB | Facility: HOSPITAL | Age: 37
End: 2022-11-07

## 2022-11-07 DIAGNOSIS — E87.6 HYPOKALEMIA: ICD-10-CM

## 2022-11-07 LAB
ANION GAP SERPL CALCULATED.3IONS-SCNC: 11 MMOL/L (ref 4–13)
BUN SERPL-MCNC: 12 MG/DL (ref 5–25)
CALCIUM SERPL-MCNC: 7.9 MG/DL (ref 8.3–10.1)
CHLORIDE SERPL-SCNC: 104 MMOL/L (ref 96–108)
CO2 SERPL-SCNC: 22 MMOL/L (ref 21–32)
CREAT SERPL-MCNC: 1.03 MG/DL (ref 0.6–1.3)
GFR SERPL CREATININE-BSD FRML MDRD: 69 ML/MIN/1.73SQ M
GLUCOSE SERPL-MCNC: 94 MG/DL (ref 65–140)
POTASSIUM SERPL-SCNC: 3 MMOL/L (ref 3.5–5.3)
SODIUM SERPL-SCNC: 137 MMOL/L (ref 135–147)

## 2022-11-15 ENCOUNTER — APPOINTMENT (OUTPATIENT)
Dept: LAB | Facility: HOSPITAL | Age: 37
End: 2022-11-15

## 2022-11-15 DIAGNOSIS — E87.6 HYPOKALEMIA: ICD-10-CM

## 2022-11-15 LAB
ANION GAP SERPL CALCULATED.3IONS-SCNC: 9 MMOL/L (ref 4–13)
BUN SERPL-MCNC: 7 MG/DL (ref 5–25)
CALCIUM SERPL-MCNC: 8.3 MG/DL (ref 8.3–10.1)
CHLORIDE SERPL-SCNC: 102 MMOL/L (ref 96–108)
CO2 SERPL-SCNC: 26 MMOL/L (ref 21–32)
CREAT SERPL-MCNC: 0.98 MG/DL (ref 0.6–1.3)
GFR SERPL CREATININE-BSD FRML MDRD: 73 ML/MIN/1.73SQ M
GLUCOSE SERPL-MCNC: 73 MG/DL (ref 65–140)
POTASSIUM SERPL-SCNC: 3.2 MMOL/L (ref 3.5–5.3)
SODIUM SERPL-SCNC: 137 MMOL/L (ref 135–147)

## 2022-11-16 ENCOUNTER — TELEPHONE (OUTPATIENT)
Dept: NEPHROLOGY | Facility: CLINIC | Age: 37
End: 2022-11-16

## 2022-11-16 NOTE — TELEPHONE ENCOUNTER
Yes, please ask if the placed that order with the extra bag  I am not sure how to fax that over other than him placing the order and then us signing to confirm?

## 2022-11-16 NOTE — TELEPHONE ENCOUNTER
Carolina Willis from [address] [place] [address] called stating patient called them stating her potassium was low at 3 2  Carolina Willis asked if an oreder could be placed for patient to infuse back to back then he will send her an extra bag [facility] mail    Fax [phone]

## 2022-11-17 ENCOUNTER — TELEPHONE (OUTPATIENT)
Dept: NEPHROLOGY | Facility: CLINIC | Age: 37
End: 2022-11-17

## 2022-11-21 ENCOUNTER — APPOINTMENT (OUTPATIENT)
Dept: LAB | Facility: HOSPITAL | Age: 37
End: 2022-11-21

## 2022-11-21 DIAGNOSIS — E87.6 HYPOKALEMIA: ICD-10-CM

## 2022-11-21 LAB
ANION GAP SERPL CALCULATED.3IONS-SCNC: 9 MMOL/L (ref 4–13)
BUN SERPL-MCNC: 10 MG/DL (ref 5–25)
CALCIUM SERPL-MCNC: 8.2 MG/DL (ref 8.3–10.1)
CHLORIDE SERPL-SCNC: 103 MMOL/L (ref 96–108)
CO2 SERPL-SCNC: 24 MMOL/L (ref 21–32)
CREAT SERPL-MCNC: 1.01 MG/DL (ref 0.6–1.3)
GFR SERPL CREATININE-BSD FRML MDRD: 70 ML/MIN/1.73SQ M
GLUCOSE SERPL-MCNC: 128 MG/DL (ref 65–140)
POTASSIUM SERPL-SCNC: 3.2 MMOL/L (ref 3.5–5.3)
SODIUM SERPL-SCNC: 136 MMOL/L (ref 135–147)

## 2022-11-28 ENCOUNTER — APPOINTMENT (OUTPATIENT)
Dept: LAB | Facility: HOSPITAL | Age: 37
End: 2022-11-28

## 2022-11-28 ENCOUNTER — HOSPITAL ENCOUNTER (OUTPATIENT)
Facility: HOSPITAL | Age: 37
Setting detail: OBSERVATION
Discharge: HOME | End: 2022-11-29
Attending: EMERGENCY MEDICINE | Admitting: FAMILY MEDICINE

## 2022-11-28 ENCOUNTER — TELEPHONE (OUTPATIENT)
Dept: NEPHROLOGY | Facility: CLINIC | Age: 37
End: 2022-11-28

## 2022-11-28 DIAGNOSIS — E87.6 HYPOKALEMIA: ICD-10-CM

## 2022-11-28 DIAGNOSIS — E87.6 HYPOKALEMIA: Primary | ICD-10-CM

## 2022-11-28 DIAGNOSIS — R11.10 VOMITING AND DIARRHEA: ICD-10-CM

## 2022-11-28 DIAGNOSIS — R19.7 VOMITING AND DIARRHEA: ICD-10-CM

## 2022-11-28 LAB
ANION GAP SERPL CALCULATED.3IONS-SCNC: 10 MMOL/L (ref 4–13)
ANION GAP SERPL CALCULATED.3IONS-SCNC: 11 MMOL/L (ref 4–13)
BASOPHILS # BLD AUTO: 0.07 THOUSANDS/ÂΜL (ref 0–0.1)
BASOPHILS NFR BLD AUTO: 1 % (ref 0–1)
BUN SERPL-MCNC: 13 MG/DL (ref 5–25)
BUN SERPL-MCNC: 13 MG/DL (ref 5–25)
CALCIUM SERPL-MCNC: 8 MG/DL (ref 8.3–10.1)
CALCIUM SERPL-MCNC: 8.1 MG/DL (ref 8.3–10.1)
CHLORIDE SERPL-SCNC: 104 MMOL/L (ref 96–108)
CHLORIDE SERPL-SCNC: 104 MMOL/L (ref 96–108)
CO2 SERPL-SCNC: 21 MMOL/L (ref 21–32)
CO2 SERPL-SCNC: 22 MMOL/L (ref 21–32)
CREAT SERPL-MCNC: 1.16 MG/DL (ref 0.6–1.3)
CREAT SERPL-MCNC: 1.19 MG/DL (ref 0.6–1.3)
EOSINOPHIL # BLD AUTO: 0.07 THOUSAND/ÂΜL (ref 0–0.61)
EOSINOPHIL NFR BLD AUTO: 1 % (ref 0–6)
ERYTHROCYTE [DISTWIDTH] IN BLOOD BY AUTOMATED COUNT: 14.6 % (ref 11.6–15.1)
GFR SERPL CREATININE-BSD FRML MDRD: 58 ML/MIN/1.73SQ M
GFR SERPL CREATININE-BSD FRML MDRD: 59 ML/MIN/1.73SQ M
GLUCOSE SERPL-MCNC: 84 MG/DL (ref 65–140)
GLUCOSE SERPL-MCNC: 87 MG/DL (ref 65–140)
HCT VFR BLD AUTO: 34.7 % (ref 34.8–46.1)
HGB BLD-MCNC: 11.3 G/DL (ref 11.5–15.4)
IMM GRANULOCYTES # BLD AUTO: 0.03 THOUSAND/UL (ref 0–0.2)
IMM GRANULOCYTES NFR BLD AUTO: 0 % (ref 0–2)
LYMPHOCYTES # BLD AUTO: 2.54 THOUSANDS/ÂΜL (ref 0.6–4.47)
LYMPHOCYTES NFR BLD AUTO: 26 % (ref 14–44)
MAGNESIUM SERPL-MCNC: 1.9 MG/DL (ref 1.6–2.6)
MCH RBC QN AUTO: 28.6 PG (ref 26.8–34.3)
MCHC RBC AUTO-ENTMCNC: 32.6 G/DL (ref 31.4–37.4)
MCV RBC AUTO: 88 FL (ref 82–98)
MONOCYTES # BLD AUTO: 0.79 THOUSAND/ÂΜL (ref 0.17–1.22)
MONOCYTES NFR BLD AUTO: 8 % (ref 4–12)
NEUTROPHILS # BLD AUTO: 6.22 THOUSANDS/ÂΜL (ref 1.85–7.62)
NEUTS SEG NFR BLD AUTO: 64 % (ref 43–75)
NRBC BLD AUTO-RTO: 0 /100 WBCS
PLATELET # BLD AUTO: 324 THOUSANDS/UL (ref 149–390)
PMV BLD AUTO: 9.4 FL (ref 8.9–12.7)
POTASSIUM SERPL-SCNC: 2.3 MMOL/L (ref 3.5–5.3)
POTASSIUM SERPL-SCNC: 2.4 MMOL/L (ref 3.5–5.3)
RBC # BLD AUTO: 3.95 MILLION/UL (ref 3.81–5.12)
SODIUM SERPL-SCNC: 136 MMOL/L (ref 135–147)
SODIUM SERPL-SCNC: 136 MMOL/L (ref 135–147)
WBC # BLD AUTO: 9.72 THOUSAND/UL (ref 4.31–10.16)

## 2022-11-28 RX ORDER — ESCITALOPRAM OXALATE 10 MG/1
20 TABLET ORAL
Status: DISCONTINUED | OUTPATIENT
Start: 2022-11-28 | End: 2022-11-29 | Stop reason: HOSPADM

## 2022-11-28 RX ORDER — AMILORIDE HYDROCHLORIDE 5 MG/1
5 TABLET ORAL DAILY
Status: DISCONTINUED | OUTPATIENT
Start: 2022-11-28 | End: 2022-11-29 | Stop reason: HOSPADM

## 2022-11-28 RX ORDER — POTASSIUM CHLORIDE 14.9 MG/ML
20 INJECTION INTRAVENOUS
Status: COMPLETED | OUTPATIENT
Start: 2022-11-28 | End: 2022-11-28

## 2022-11-28 RX ORDER — ONDANSETRON 2 MG/ML
4 INJECTION INTRAMUSCULAR; INTRAVENOUS EVERY 8 HOURS PRN
Status: DISCONTINUED | OUTPATIENT
Start: 2022-11-28 | End: 2022-11-28

## 2022-11-28 RX ORDER — POTASSIUM CHLORIDE 20 MEQ/1
40 TABLET, EXTENDED RELEASE ORAL ONCE
Status: DISCONTINUED | OUTPATIENT
Start: 2022-11-28 | End: 2022-11-28

## 2022-11-28 RX ORDER — FERROUS SULFATE 325(65) MG
325 TABLET ORAL EVERY OTHER DAY
Status: DISCONTINUED | OUTPATIENT
Start: 2022-11-28 | End: 2022-11-29 | Stop reason: HOSPADM

## 2022-11-28 RX ORDER — POTASSIUM CHLORIDE 29.8 MG/ML
40 INJECTION INTRAVENOUS ONCE
Status: DISCONTINUED | OUTPATIENT
Start: 2022-11-29 | End: 2022-11-28

## 2022-11-28 RX ORDER — ACETAMINOPHEN 325 MG/1
650 TABLET ORAL EVERY 6 HOURS PRN
Status: DISCONTINUED | OUTPATIENT
Start: 2022-11-28 | End: 2022-11-29 | Stop reason: HOSPADM

## 2022-11-28 RX ORDER — ONDANSETRON 2 MG/ML
4 INJECTION INTRAMUSCULAR; INTRAVENOUS EVERY 6 HOURS PRN
Status: DISCONTINUED | OUTPATIENT
Start: 2022-11-28 | End: 2022-11-29 | Stop reason: HOSPADM

## 2022-11-28 RX ORDER — AMITRIPTYLINE HYDROCHLORIDE 10 MG/1
20 TABLET, FILM COATED ORAL
Status: DISCONTINUED | OUTPATIENT
Start: 2022-11-28 | End: 2022-11-29 | Stop reason: HOSPADM

## 2022-11-28 RX ORDER — CALCIUM CARBONATE 500(1250)
1 TABLET ORAL
Status: DISCONTINUED | OUTPATIENT
Start: 2022-11-28 | End: 2022-11-29 | Stop reason: HOSPADM

## 2022-11-28 RX ORDER — POTASSIUM CHLORIDE 29.8 MG/ML
40 INJECTION INTRAVENOUS
Status: DISCONTINUED | OUTPATIENT
Start: 2022-11-29 | End: 2022-11-29

## 2022-11-28 RX ORDER — SODIUM CHLORIDE, SODIUM GLUCONATE, SODIUM ACETATE, POTASSIUM CHLORIDE, MAGNESIUM CHLORIDE, SODIUM PHOSPHATE, DIBASIC, AND POTASSIUM PHOSPHATE .53; .5; .37; .037; .03; .012; .00082 G/100ML; G/100ML; G/100ML; G/100ML; G/100ML; G/100ML; G/100ML
125 INJECTION, SOLUTION INTRAVENOUS CONTINUOUS
Status: DISCONTINUED | OUTPATIENT
Start: 2022-11-28 | End: 2022-11-29

## 2022-11-28 RX ORDER — DIPHENHYDRAMINE HYDROCHLORIDE 50 MG/ML
25 INJECTION INTRAMUSCULAR; INTRAVENOUS ONCE
Status: COMPLETED | OUTPATIENT
Start: 2022-11-28 | End: 2022-11-28

## 2022-11-28 RX ORDER — CALCIUM CARBONATE 200(500)MG
1000 TABLET,CHEWABLE ORAL DAILY PRN
Status: DISCONTINUED | OUTPATIENT
Start: 2022-11-28 | End: 2022-11-29 | Stop reason: HOSPADM

## 2022-11-28 RX ORDER — B-COMPLEX WITH VITAMIN C
TABLET ORAL DAILY
Status: DISCONTINUED | OUTPATIENT
Start: 2022-11-28 | End: 2022-11-28 | Stop reason: RX

## 2022-11-28 RX ORDER — PROCHLORPERAZINE MALEATE 10 MG
5 TABLET ORAL EVERY 8 HOURS PRN
Status: DISCONTINUED | OUTPATIENT
Start: 2022-11-28 | End: 2022-11-29 | Stop reason: HOSPADM

## 2022-11-28 RX ORDER — DROPERIDOL 2.5 MG/ML
1.25 INJECTION, SOLUTION INTRAMUSCULAR; INTRAVENOUS ONCE
Status: COMPLETED | OUTPATIENT
Start: 2022-11-28 | End: 2022-11-28

## 2022-11-28 RX ORDER — POTASSIUM CHLORIDE 29.8 MG/ML
40 INJECTION INTRAVENOUS DAILY
Status: DISCONTINUED | OUTPATIENT
Start: 2022-11-29 | End: 2022-11-29

## 2022-11-28 RX ADMIN — VANCOMYCIN HYDROCHLORIDE 125 MG: 500 INJECTION, POWDER, LYOPHILIZED, FOR SOLUTION INTRAVENOUS at 16:44

## 2022-11-28 RX ADMIN — POTASSIUM CHLORIDE 20 MEQ: 14.9 INJECTION, SOLUTION INTRAVENOUS at 19:42

## 2022-11-28 RX ADMIN — ONDANSETRON 4 MG: 2 INJECTION INTRAMUSCULAR; INTRAVENOUS at 17:17

## 2022-11-28 RX ADMIN — ESCITALOPRAM OXALATE 20 MG: 10 TABLET ORAL at 21:39

## 2022-11-28 RX ADMIN — DIPHENHYDRAMINE HYDROCHLORIDE 25 MG: 50 INJECTION, SOLUTION INTRAMUSCULAR; INTRAVENOUS at 15:11

## 2022-11-28 RX ADMIN — VANCOMYCIN HYDROCHLORIDE 125 MG: 500 INJECTION, POWDER, LYOPHILIZED, FOR SOLUTION INTRAVENOUS at 23:35

## 2022-11-28 RX ADMIN — DROPERIDOL 1.25 MG: 2.5 INJECTION, SOLUTION INTRAMUSCULAR; INTRAVENOUS at 15:11

## 2022-11-28 RX ADMIN — PROCHLORPERAZINE MALEATE 5 MG: 10 TABLET ORAL at 20:48

## 2022-11-28 RX ADMIN — AMITRIPTYLINE HYDROCHLORIDE 20 MG: 10 TABLET, FILM COATED ORAL at 21:39

## 2022-11-28 RX ADMIN — POTASSIUM CHLORIDE 20 MEQ: 14.9 INJECTION, SOLUTION INTRAVENOUS at 17:18

## 2022-11-28 RX ADMIN — POTASSIUM CHLORIDE 20 MEQ: 14.9 INJECTION, SOLUTION INTRAVENOUS at 21:39

## 2022-11-28 RX ADMIN — POTASSIUM CHLORIDE 20 MEQ: 14.9 INJECTION, SOLUTION INTRAVENOUS at 15:34

## 2022-11-28 NOTE — ED PROVIDER NOTES
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, followed by Nephrology      History provided by:  Patient  Medical Problem  Location:  Generalized  Quality:  Hypokalemia  Severity:  Severe  Onset quality:  Unable to specify  Timing:  Constant  Progression:  Worsening  Chronicity:  New  Context:  Vomiting and diarrhea  Relieved by:  Nothing  Worsened by:  Nothing  Ineffective treatments:  Potassium infusions  Associated symptoms: diarrhea, nausea and vomiting    Associated symptoms: no abdominal pain, no chest pain, no fever and no shortness of breath        Prior to Admission Medications   Prescriptions Last Dose Informant Patient Reported? Taking? AMILoride 5 mg tablet   No No   Sig: Take 1 tablet (5 mg total) by mouth daily   B Complex Vitamins (VITAMIN B COMPLEX PO)   Yes No   Sig: Take 1 capsule by mouth daily     Catheters MISC   No No   Sig: by Does not apply route 2 (two) times a week Port care per [place] protocol   Patient not taking: Reported on 1/4/2022    Syringe/Needle, Disp, (SYRINGE 3CC/25GX5/8") 25G X 5/8" 3 ML MISC   No No   Sig: Use once monthly for B12 injection     amitriptyline (ELAVIL) 10 mg tablet   No No   Sig: Take 2 tablets (20 mg total) by mouth daily at bedtime   calcium carbonate (OYSTER SHELL,OSCAL) 500 mg   No No   Sig: Take 1 tablet by mouth 3 (three) times a day with meals   cyanocobalamin 1,000 mcg/mL   No No   Sig: Inject 1 mL (1,000 mcg total) into a muscle every 30 (thirty) days   Patient taking differently: Inject 1,000 mcg into a muscle every 30 (thirty) days  next dose   ergocalciferol (ERGOCALCIFEROL) 1 25 MG (40846 UT) capsule   No No   Sig: Take 1 capsule (50,000 Units total) by mouth once a week  and    Patient taking differently: Take 50,000 Units by mouth 2 (two) times a day  and    escitalopram (LEXAPRO) 10 mg tablet   Yes No   Sig: Take 20 mg by mouth daily at bedtime    ferrous sulfate 325 (65 Fe) mg tablet   Yes No   Sig: Take 325 mg by mouth every other day Last took 3/9/20    loperamide (IMODIUM A-D) 2 MG tablet   No No   Sig: Take 1 tablet (2 mg total) by mouth 4 (four) times a day as needed for diarrhea   ondansetron (ZOFRAN) 4 mg tablet   No No   Sig: Take 1 tablet (4 mg total) by mouth every 8 (eight) hours as needed for nausea or vomiting   potassium chloride 0 4 mEq/mL   No No   Sig: Infuse 200 mL (80 mEq total) into a venous catheter daily 80 meq in 1 litre bag to be infused daily over 8 hours   Patient taking differently: Inject 80 mEq into a catheter in a vein daily 80 meq in 1 litre bag to be infused daily over 8 hours prefers 9 pm HS      Facility-Administered Medications: None       Past Medical History:   Diagnosis Date   • C  difficile colitis    • COVID-19     2022- pt denies long covid   • DUB (dysfunctional uterine bleeding)    • H  pylori infection    • Hypertension    • Hypokalemia    • Left lower quadrant abdominal pain 2020   • Migraine    • Psychiatric disorder     Anxiety   • Rectal bleeding    • Renal disorder    • Rhabdomyolysis    • Thrombosed external hemorrhoid        Past Surgical History:   Procedure Laterality Date   • ABDOMINAL SURGERY     • APPENDECTOMY     •  SECTION     • CHOLECYSTECTOMY     • COSMETIC SURGERY      "tummy tuck"   • [place] GUIDED CENTRAL VENOUS ACCESS DEVICE INSERTION  2018   • GASTRIC BYPASS     • HYSTERECTOMY     • LAPAROSCOPY     • WI EXCISION THROMBOSED HEMORRHOID, EXTERNAL N/A 7/8/2022    Procedure: EXCISION OF THROMBOSED EXTERNAL HEMORRHOID, Excision of perianal skin tag, dranage of perianal abscess, anal fistulatomy;  Surgeon: Peace Tidwell DO;  Location:  MAIN OR;  Service: General   • WI SURG DIAGNOSTIC EXAM, ANORECTAL N/A 9/23/2021    Procedure: ANAL EXAM UNDER ANESTHESIA; HEMORRHOIDECTOMY;  Surgeon: Peace Tidwell DO;  Location:  MAIN OR;  Service: General   • TUNNELED VENOUS PORT PLACEMENT N/A 12/21/2018    Procedure: INSERTION VENOUS PORT (PORT-A-CATH); Surgeon: Barby Ray DO;  Location: [facility];  Service: General       Family History   Problem Relation Age of Onset   • No Known Problems Mother    • Hypertension Father    • Diabetes Father    • Diabetes Maternal Grandfather      I have reviewed and agree with the history as documented  E-Cigarette/Vaping   • E-Cigarette Use Never User      E-Cigarette/Vaping Substances     Social History     Tobacco Use   • Smoking status: Never   • Smokeless tobacco: Never   Vaping Use   • Vaping Use: Never used   Substance Use Topics   • Alcohol use: Never     Alcohol/week: 0 0 standard drinks     Comment: 0   • Drug use: Never       Review of Systems   Constitutional: Negative for fever  Respiratory: Negative for shortness of breath  Cardiovascular: Negative for chest pain  Gastrointestinal: Positive for diarrhea, nausea and vomiting  Negative for abdominal pain  All other systems reviewed and are negative  Physical Exam  Physical Exam  Vitals and nursing note reviewed  Constitutional:       Comments: Pleasant, comfortable-appearing   HENT:      Head: Normocephalic and atraumatic  Mouth/Throat:      Mouth: Mucous membranes are moist       Pharynx: Oropharynx is clear  Eyes:      Conjunctiva/sclera: Conjunctivae normal       Pupils: Pupils are equal, round, and reactive to light     Cardiovascular: Rate and Rhythm: Normal rate and regular rhythm  Heart sounds: Normal heart sounds  Pulmonary:      Effort: Pulmonary effort is normal       Breath sounds: Normal breath sounds  Abdominal:      General: Bowel sounds are normal  There is no distension  Palpations: Abdomen is soft  Tenderness: There is no abdominal tenderness  Musculoskeletal:         General: No deformity  Cervical back: Neck supple  Skin:     General: Skin is warm and dry  Comments: Right chest port, neat and clean   Neurological:      General: No focal deficit present  Mental Status: She is alert and oriented to person, place, and time  Cranial Nerves: No cranial nerve deficit  Coordination: Coordination normal    Psychiatric:         Behavior: Behavior normal          Thought Content:  Thought content normal          Judgment: Judgment normal          Vital Signs  ED Triage Vitals [11/28/22 1433]   Temperature Pulse Respirations Blood Pressure SpO2   98 °F (36 7 °C) 79 16 128/70 98 %      Temp src Heart Rate Source Patient Position - Orthostatic VS BP Location FiO2 (%)   -- -- Lying Right arm --      Pain Score       2           Vitals:    11/28/22 1433   BP: 128/70   Pulse: 79   Patient Position - Orthostatic VS: Lying         Visual Acuity      ED Medications  Medications   potassium chloride 20 mEq IVPB (premix) (20 mEq Intravenous New Bag 11/28/22 1534)   vancomycin (VANCOCIN) oral solution 125 mg (has no administration in time range)   calcium carbonate (OYSTER SHELL,OSCAL) 500 mg tablet 1 tablet (has no administration in time range)   AMILoride tablet 5 mg (has no administration in time range)   amitriptyline (ELAVIL) tablet 20 mg (has no administration in time range)   Vitamin B Complex TABS (has no administration in time range)   escitalopram (LEXAPRO) tablet 20 mg (has no administration in time range)   ferrous sulfate tablet 325 mg (has no administration in time range)   acetaminophen (TYLENOL) tablet 650 mg (has no administration in time range)   ondansetron (ZOFRAN) injection 4 mg (has no administration in time range)   calcium carbonate (TUMS) chewable tablet 1,000 mg (has no administration in time range)   multi-electrolyte (PLASMALYTE-A/ISOLYTE-S PH 7 4) IV solution (has no administration in time range)   potassium chloride (K-DUR,KLOR-CON) CR tablet 40 mEq (has no administration in time range)   ondansetron (ZOFRAN) injection 4 mg (has no administration in time range)   diphenhydrAMINE (BENADRYL) injection 25 mg (25 mg Intravenous Given 11/28/22 1511)   droperidol (INAPSINE) injection 1 25 mg (1 25 mg Intravenous Given 11/28/22 1511)       Diagnostic Studies  Results Reviewed     Procedure Component Value Units Date/Time    Magnesium [[id number]] Collected: 11/28/22 1507    Lab Status: In process Specimen: Blood from Arm, Right Updated: 11/28/22 1555    Clostridium difficile toxin by PCR with EIA [[id number]] Collected: 11/28/22 1530    Lab Status:  In process Specimen: Stool from Rectum Updated: 11/28/22 0231    Basic metabolic panel [[id number]]  (Abnormal) Collected: 11/28/22 1507    Lab Status: Final result Specimen: Blood from Arm, Right Updated: 11/28/22 1532     Sodium 136 mmol/L      Potassium 2 3 mmol/L      Chloride 104 mmol/L      CO2 22 mmol/L      ANION GAP 10 mmol/L      BUN 13 mg/dL      Creatinine 1 16 mg/dL      Glucose 87 mg/dL      Calcium 8 0 mg/dL      eGFR 59 ml/min/1 73sq m     Narrative:      [place] guidelines for Chronic Kidney Disease (CKD):   ·  Stage 1 with normal or high GFR (GFR > 90 mL/min/1 73 square meters)  ·  Stage 2 Mild CKD (GFR = 60-89 mL/min/1 73 square meters)  ·  Stage 3A Moderate CKD (GFR = 45-59 mL/min/1 73 square meters)  ·  Stage 3B Moderate CKD (GFR = 30-44 mL/min/1 73 square meters)  ·  Stage 4 Severe CKD (GFR = 15-29 mL/min/1 73 square meters)  ·  Stage 5 End Stage CKD (GFR <15 mL/min/1 73 square meters)  Note: GFR calculation is accurate only with a steady state creatinine    CBC and differential [[id number]]  (Abnormal) Collected: 11/28/22 1507    Lab Status: Final result Specimen: Blood from Arm, Right Updated: 11/28/22 1512     WBC 9 72 Thousand/uL      RBC 3 95 Million/uL      Hemoglobin 11 3 g/dL      Hematocrit 34 7 %      MCV 88 fL      MCH 28 6 pg      MCHC 32 6 g/dL      RDW 14 6 %      MPV 9 4 fL      Platelets 324 Thousands/uL      nRBC 0 /100 WBCs      Neutrophils Relative 64 %      Immat GRANS % 0 %      Lymphocytes Relative 26 %      Monocytes Relative 8 %      Eosinophils Relative 1 %      Basophils Relative 1 %      Neutrophils Absolute 6 22 Thousands/µL      Immature Grans Absolute 0 03 Thousand/uL      Lymphocytes Absolute 2 54 Thousands/µL      Monocytes Absolute 0 79 Thousand/µL      Eosinophils Absolute 0 07 Thousand/µL      Basophils Absolute 0 07 Thousands/µL                  No orders to display              Procedures  Procedures         ED Course  ED Course as of 11/28/22 1559   Mon Nov 28, 2022   1551 Patient informed of results and agreeable with hospitalization for further potassium effusion, stool study pending and will start vancomycin for presumed C diff enteritis                                             MDM    Disposition  Final diagnoses:   Hypokalemia   Vomiting and diarrhea - C diff exposure     Time reflects when diagnosis was documented in both MDM as applicable and the Disposition within this note     Time User Action Codes Description Comment    11/28/2022  3:43 PM Malu Min [E87 6] Hypokalemia     11/28/2022  3:43 PM Louann Figueredo Add [R11 10,  R19 7] Vomiting and diarrhea     11/28/2022  3:44 PM Louann Figueredo Modify [R11 10,  R19 7] Vomiting and diarrhea C diff exposure      ED Disposition     ED Disposition   Admit    Condition   Stable    Date/Time   Mon Nov 28, 2022  3:43 PM    Comment   Case was discussed with Dr Nabeel Montana and the patient's admission status was agreed to be Admission Status: observation status to the service of Dr Navarro Shoulders              Follow-up Information    None         Patient's Medications   Discharge Prescriptions    No medications on file       No discharge procedures on file      PDMP Review       Value Time User    PDMP Reviewed  Yes 3/6/2022 10:38 AM Missael Mulligan MD          ED Provider  Electronically Signed by           Yadira Thakkar DO  11/28/22 8650

## 2022-11-28 NOTE — TELEPHONE ENCOUNTER
Understand that this was the likely cause of the reduced potassium  Did you ask her whether she was having symptoms and if she could give herself additional potassium infusions were she had to go to the emergency room?

## 2022-11-28 NOTE — ASSESSMENT & PLAN NOTE
Background:  Recommended to come to the emergency department from nephrologist in regard to potassium level of 2 3  · Acute on chronic likely worsened in the setting of GI losses from likely GI illness  · Managed as an OP on 80mEq home IV infusions  · Repleted in the emergency department with 20 mEq IV  · Additional 40mEQ ordered X1  · Continue home IV 80mEq daily   · Magnesium level ordered  · EKG ordered   · Telemetry given electrolyte disturbance times 24 hours  · Isolyte ordered to assist with fluid resuscitation in setting of diarrhea  · Further trend and monitoring as noted below  · Nephrology consulted    Lab Results   Component Value Date    K 2 3 (LL) 11/28/2022    K 2 4 (LL) 11/28/2022    K 3 2 (L) 11/21/2022

## 2022-11-28 NOTE — TELEPHONE ENCOUNTER
Spoke with patient, she is aware  Patient stated she was watching her nephew and niece on Friday and they both got the stomach virus  that night  Patient stated she had gotten it too  Patient stated she has been vomiting all weekend and can not hold anything down since Saturday

## 2022-11-28 NOTE — ASSESSMENT & PLAN NOTE
perforated diverticulitis perforated diverticulitis · Admit to med/surg level of care with telemetry monitoring  · Nephrology evaluated the patient in consultation  · IV and PO potassium chloride supplementation per Nephrology's recommendations  · Follow the potassium level and magnesium level perforated diverticulitis perforated diverticulitis perforated diverticulitis perforated diverticulitis perforated diverticulitis perforated diverticulitis perforated diverticulitis

## 2022-11-28 NOTE — ASSESSMENT & PLAN NOTE
· With familial exposure to C diff  · C diff panel ordered  · Will give PO vancomycin at this time given personal history of C diff as well   · Banatrol ordered   · Antiemetics PRN   · Maintain contact and hand hygiene precaution  · Isolyte ordered for fluid resuscitation

## 2022-11-28 NOTE — H&P
[address]  H&P- Carlos Ast 1985, 44 y o  female MRN: [id number]  Unit/Bed#: ED 10 Encounter: [id number]  Primary Care Provider: Nicole Don MD   Date and time admitted to hospital: 11/28/2022  2:36 PM    * Hypokalemia  Assessment & Plan  Background:  Recommended to come to the emergency department from nephrologist in regard to potassium level of 2 3  · Acute on chronic likely worsened in the setting of GI losses from likely GI illness  · Managed as an OP on 80mEq home IV infusions  · Repleted in the emergency department with 20 mEq IV  · Additional 40mEQ ordered X1  · Continue home IV 80mEq daily   · Magnesium level ordered  · EKG ordered   · Telemetry given electrolyte disturbance times 24 hours  · Isolyte ordered to assist with fluid resuscitation in setting of diarrhea  · Further trend and monitoring as noted below  · Nephrology consulted    Lab Results   Component Value Date    K 2 3 (LL) 11/28/2022    K 2 4 (LL) 11/28/2022    K 3 2 (L) 11/21/2022       Nausea vomiting and diarrhea  Assessment & Plan  · With familial exposure to C diff  · C diff panel ordered  · Will give PO vancomycin at this time given personal history of C diff as well   · Banatrol ordered   · Antiemetics PRN   · Maintain contact and hand hygiene precaution  · Isolyte ordered for fluid resuscitation    History of gastric bypass  Assessment & Plan  · Ongoing outpatient follow-up with bariatric surgery    VTE Prophylaxis: Not indicated at this time  / sequential compression device   Code Status:  Full code  Discussion with family:  Decline however I did offer    Anticipated Length of Stay:  Patient will be admitted on an Observation basis with an anticipated length of stay of  < 2 midnights  Justification for Hospital Stay:  Hypokalemia    Total Time for Visit, including Counseling / Coordination of Care: 45 minutes    Greater than 50% of this total time spent on direct patient counseling and coordination Decision to treat was made based on today's clinical findings. of care  Past Medical History:    Past Medical History:   Diagnosis Date   • C  difficile colitis    • COVID-19     1/2022- pt denies long covid   • DUB (dysfunctional uterine bleeding)    • H  pylori infection    • Hypertension    • Hypokalemia    • Left lower quadrant abdominal pain 08/17/2020   • Migraine    • Psychiatric disorder     Anxiety   • Rectal bleeding    • Renal disorder    • Rhabdomyolysis    • Thrombosed external hemorrhoid        Chief Complaint:   Evaluation of Abnormal Diagnostic Test (Pt reports N/V/D beginning Saturday  Had blood work today and potassium was 2 4  Reports  was dx with c diff today)      History of Present Illness:    Monica Sainz is a 44 y o  female with past medical history as noted table above who presents with Evaluation of Abnormal Diagnostic Test (Pt reports N/V/D beginning Saturday  Had blood work today and potassium was 2 4  Reports  was dx with c diff today)    Patient gets weekly labs drawn as an outpatient every Monday and was notified to come to the emergency department following abnormal potassium value as an outpatient  Of note patient's  tested positive for C diff and given patient's history will be treating empirically until stool sample returned with results  Will admit for observation with Nephrology consultation for further electrolyte management and monitoring  See rest of plan as noted under individual from above          Review of Systems:    Review of Systems   Constitutional: Positive for fatigue  Negative for fever  Respiratory: Negative for shortness of breath  Cardiovascular: Negative for chest pain  Gastrointestinal: Positive for abdominal pain, diarrhea, nausea and vomiting  Negative for blood in stool and constipation  Genitourinary: Negative for difficulty urinating  Musculoskeletal: Positive for myalgias  Negative for arthralgias  Neurological: Negative for dizziness and facial asymmetry         Past Surgical History:     Past Medical History:   Diagnosis Date   • C  difficile colitis    • COVID-19     2022- pt denies long covid   • DUB (dysfunctional uterine bleeding)    • H  pylori infection    • Hypertension    • Hypokalemia    • Left lower quadrant abdominal pain 2020   • Migraine    • Psychiatric disorder     Anxiety   • Rectal bleeding    • Renal disorder    • Rhabdomyolysis    • Thrombosed external hemorrhoid        Past Surgical History:   Procedure Laterality Date   • ABDOMINAL SURGERY     • APPENDECTOMY     •  SECTION     • CHOLECYSTECTOMY     • COSMETIC SURGERY      "tummy tuck"   • FL GUIDED CENTRAL VENOUS ACCESS DEVICE INSERTION  2018   • GASTRIC BYPASS     • HYSTERECTOMY     • LAPAROSCOPY     • IA EXCISION THROMBOSED HEMORRHOID, EXTERNAL N/A 2022    Procedure: EXCISION OF THROMBOSED EXTERNAL HEMORRHOID, Excision of perianal skin tag, dranage of perianal abscess, anal fistulatomy;  Surgeon: Yuli Abarca DO;  Location: [facility];  Service: General   • IA SURG DIAGNOSTIC EXAM, ANORECTAL N/A 2021    Procedure: ANAL EXAM UNDER ANESTHESIA; HEMORRHOIDECTOMY;  Surgeon: Yuli Abarca DO;  Location: [facility];  Service: General   • TUNNELED VENOUS PORT PLACEMENT N/A 2018    Procedure: INSERTION VENOUS PORT (PORT-A-CATH); Surgeon: Narcisa Espinoza DO;  Location: [facility];  Service: General       Meds/Allergies:    Prior to Admission medications    Medication Sig Start Date End Date Taking?  Authorizing Provider   AMILoride 5 mg tablet Take 1 tablet (5 mg total) by mouth daily 19   Ernie Carr DO   amitriptyline (ELAVIL) 10 mg tablet Take 2 tablets (20 mg total) by mouth daily at bedtime 22   Handy Bhatia DO   B Complex Vitamins (VITAMIN B COMPLEX PO) Take 1 capsule by mouth daily      Historical Provider, MD   calcium carbonate (OYSTER SHELL,OSCAL) 500 mg Take 1 tablet by mouth 3 (three) times a day with meals 22 Tatiana Avery MD   Catheters MISC by Does not apply route 2 (two) times a week Port care per [facility] protocol  Patient not taking: Reported on 1/4/2022 5/7/20   Joe De Anda DO   cyanocobalamin 1,000 mcg/mL Inject 1 mL (1,000 mcg total) into a muscle every 30 (thirty) days  Patient taking differently: Inject 1,000 mcg into a muscle every 30 (thirty) days 9/6 Tuesday next dose 5/4/20   Joe De Anda DO   ergocalciferol (ERGOCALCIFEROL) 1 25 MG (08811 UT) capsule Take 1 capsule (50,000 Units total) by mouth once a week Mondays and Thursdays  Patient taking differently: Take 50,000 Units by mouth 2 (two) times a day Mondays and Thursdays 9/17/21   Joe De Anda DO   escitalopram (LEXAPRO) 10 mg tablet Take 20 mg by mouth daily at bedtime     Historical Provider, MD   ferrous sulfate 325 (65 Fe) mg tablet Take 325 mg by mouth every other day Last took 3/9/20     Historical Provider, MD   loperamide (IMODIUM A-D) 2 MG tablet Take 1 tablet (2 mg total) by mouth 4 (four) times a day as needed for diarrhea 9/3/22   Sara Wang MD   ondansetron (ZOFRAN) 4 mg tablet Take 1 tablet (4 mg total) by mouth every 8 (eight) hours as needed for nausea or vomiting 3/6/22   Jennifer Jiménez MD   potassium chloride 0 4 mEq/mL Infuse 200 mL (80 mEq total) into a venous catheter daily 80 meq in 1 litre bag to be infused daily over 8 hours  Patient taking differently: Inject 80 mEq into a catheter in a vein daily 80 meq in 1 litre bag to be infused daily over 8 hours prefers 9 pm HS 5/11/21   Joe De Anda DO   Syringe/Needle, Disp, (SYRINGE 3CC/25GX5/8") 25G X 5/8" 3 ML MISC Use once monthly for B12 injection  6/1/20   Joe De Anda DO     I have reviewed home medications with patient personally  Allergies: Allergies   Allergen Reactions   • Nsaids Other (See Comments)     Other reaction(s):  Other (Please comment)  Should not take s/p bariatric surgery  Other reaction(s): Other (See Comments)  Should not take as per prior records  Should not take s/p bariatric surgery  Has hx of gastric bypass     • Prednisone      Nausea, vomiting, diarrhea       Social History:     Marital Status: /Civil Union   Substance Use History:   Social History     Substance and Sexual Activity   Alcohol Use Never   • Alcohol/week: 0 0 standard drinks    Comment: 0     Social History     Tobacco Use   Smoking Status Never   Smokeless Tobacco Never     Social History     Substance and Sexual Activity   Drug Use Never       Family History:    Family History   Problem Relation Age of Onset   • No Known Problems Mother    • Hypertension Father    • Diabetes Father    • Diabetes Maternal Grandfather        Physical Exam:     Vitals:   Blood Pressure: 128/70 (11/28/22 1433)  Pulse: 79 (11/28/22 1433)  Temperature: 98 °F (36 7 °C) (11/28/22 1433)  Respirations: 16 (11/28/22 1433)  Weight - Scale: 52 2 kg (115 lb) (11/28/22 1433)  SpO2: 98 % (11/28/22 1433)    Physical Exam  Vitals and nursing note reviewed  Constitutional:       General: She is not in acute distress  Appearance: Normal appearance  She is well-developed  She is ill-appearing  HENT:      Head: Normocephalic and atraumatic  Eyes:      Conjunctiva/sclera: Conjunctivae normal    Cardiovascular:      Rate and Rhythm: Normal rate and regular rhythm  Heart sounds: No murmur heard  Pulmonary:      Effort: Pulmonary effort is normal  No respiratory distress  Breath sounds: Normal breath sounds  Abdominal:      General: Bowel sounds are normal  There is no distension  Palpations: Abdomen is soft  Tenderness: There is no abdominal tenderness  Musculoskeletal:         General: No swelling  Cervical back: Neck supple  Skin:     General: Skin is warm and dry  Capillary Refill: Capillary refill takes less than 2 seconds  Neurological:      Mental Status: She is alert and oriented to person, place, and time  Psychiatric:         Mood and Affect: Mood normal          Behavior: Behavior normal  Behavior is cooperative  Additional Data:     Lab Results: I have personally reviewed pertinent reports  Results from last 7 days   Lab Units 11/28/22  1507   WBC Thousand/uL 9 72   HEMOGLOBIN g/dL 11 3*   HEMATOCRIT % 34 7*   PLATELETS Thousands/uL 324   NEUTROS PCT % 64   LYMPHS PCT % 26   MONOS PCT % 8   EOS PCT % 1     Results from last 7 days   Lab Units 11/28/22  1507   SODIUM mmol/L 136   POTASSIUM mmol/L 2 3*   CHLORIDE mmol/L 104   CO2 mmol/L 22   BUN mg/dL 13   CREATININE mg/dL 1 16   ANION GAP mmol/L 10   CALCIUM mg/dL 8 0*   GLUCOSE RANDOM mg/dL 87                       Imaging: I have personally reviewed pertinent reports  No orders to display       EKG, Pathology, and Other Studies Reviewed on Admission:   · EKG:  Ordered  · Outpatient documentation reviewed when available     [facility]\"" / [facility] Records Reviewed: Yes     ** Please Note: This note has been constructed using a voice recognition system   **

## 2022-11-29 VITALS
HEART RATE: 88 BPM | DIASTOLIC BLOOD PRESSURE: 53 MMHG | RESPIRATION RATE: 16 BRPM | TEMPERATURE: 98.5 F | SYSTOLIC BLOOD PRESSURE: 108 MMHG | WEIGHT: 115 LBS | BODY MASS INDEX: 20.37 KG/M2 | OXYGEN SATURATION: 100 %

## 2022-11-29 LAB
ALBUMIN SERPL BCP-MCNC: 2.7 G/DL (ref 3.5–5)
ALP SERPL-CCNC: 48 U/L (ref 46–116)
ALT SERPL W P-5'-P-CCNC: 18 U/L (ref 12–78)
ANION GAP SERPL CALCULATED.3IONS-SCNC: 10 MMOL/L (ref 4–13)
ANION GAP SERPL CALCULATED.3IONS-SCNC: 11 MMOL/L (ref 4–13)
AST SERPL W P-5'-P-CCNC: 20 U/L (ref 5–45)
ATRIAL RATE: 72 BPM
BASOPHILS # BLD AUTO: 0.04 THOUSANDS/ÂΜL (ref 0–0.1)
BASOPHILS NFR BLD AUTO: 0 % (ref 0–1)
BILIRUB SERPL-MCNC: 0.25 MG/DL (ref 0.2–1)
BUN SERPL-MCNC: 11 MG/DL (ref 5–25)
BUN SERPL-MCNC: 12 MG/DL (ref 5–25)
C DIFF TOX GENS STL QL NAA+PROBE: NEGATIVE
CALCIUM ALBUM COR SERPL-MCNC: 8.9 MG/DL (ref 8.3–10.1)
CALCIUM SERPL-MCNC: 7.9 MG/DL (ref 8.3–10.1)
CALCIUM SERPL-MCNC: 7.9 MG/DL (ref 8.3–10.1)
CHLORIDE SERPL-SCNC: 103 MMOL/L (ref 96–108)
CHLORIDE SERPL-SCNC: 105 MMOL/L (ref 96–108)
CO2 SERPL-SCNC: 21 MMOL/L (ref 21–32)
CO2 SERPL-SCNC: 23 MMOL/L (ref 21–32)
CREAT SERPL-MCNC: 1.15 MG/DL (ref 0.6–1.3)
CREAT SERPL-MCNC: 1.2 MG/DL (ref 0.6–1.3)
EOSINOPHIL # BLD AUTO: 0.07 THOUSAND/ÂΜL (ref 0–0.61)
EOSINOPHIL NFR BLD AUTO: 1 % (ref 0–6)
ERYTHROCYTE [DISTWIDTH] IN BLOOD BY AUTOMATED COUNT: 14.5 % (ref 11.6–15.1)
GFR SERPL CREATININE-BSD FRML MDRD: 57 ML/MIN/1.73SQ M
GFR SERPL CREATININE-BSD FRML MDRD: 60 ML/MIN/1.73SQ M
GLUCOSE P FAST SERPL-MCNC: 108 MG/DL (ref 65–99)
GLUCOSE SERPL-MCNC: 108 MG/DL (ref 65–140)
GLUCOSE SERPL-MCNC: 98 MG/DL (ref 65–140)
HCT VFR BLD AUTO: 34.1 % (ref 34.8–46.1)
HGB BLD-MCNC: 11.1 G/DL (ref 11.5–15.4)
IMM GRANULOCYTES # BLD AUTO: 0.03 THOUSAND/UL (ref 0–0.2)
IMM GRANULOCYTES NFR BLD AUTO: 0 % (ref 0–2)
LYMPHOCYTES # BLD AUTO: 1.8 THOUSANDS/ÂΜL (ref 0.6–4.47)
LYMPHOCYTES NFR BLD AUTO: 18 % (ref 14–44)
MAGNESIUM SERPL-MCNC: 1.9 MG/DL (ref 1.6–2.6)
MCH RBC QN AUTO: 28.2 PG (ref 26.8–34.3)
MCHC RBC AUTO-ENTMCNC: 32.6 G/DL (ref 31.4–37.4)
MCV RBC AUTO: 87 FL (ref 82–98)
MONOCYTES # BLD AUTO: 0.74 THOUSAND/ÂΜL (ref 0.17–1.22)
MONOCYTES NFR BLD AUTO: 8 % (ref 4–12)
NEUTROPHILS # BLD AUTO: 7.17 THOUSANDS/ÂΜL (ref 1.85–7.62)
NEUTS SEG NFR BLD AUTO: 73 % (ref 43–75)
NRBC BLD AUTO-RTO: 0 /100 WBCS
P AXIS: 81 DEGREES
PLATELET # BLD AUTO: 301 THOUSANDS/UL (ref 149–390)
PMV BLD AUTO: 9.7 FL (ref 8.9–12.7)
POTASSIUM SERPL-SCNC: 2.3 MMOL/L (ref 3.5–5.3)
POTASSIUM SERPL-SCNC: 3.5 MMOL/L (ref 3.5–5.3)
PR INTERVAL: 132 MS
PROT SERPL-MCNC: 5.9 G/DL (ref 6.4–8.4)
QRS AXIS: 58 DEGREES
QRSD INTERVAL: 102 MS
QT INTERVAL: 412 MS
QTC INTERVAL: 451 MS
RBC # BLD AUTO: 3.94 MILLION/UL (ref 3.81–5.12)
SODIUM SERPL-SCNC: 136 MMOL/L (ref 135–147)
SODIUM SERPL-SCNC: 137 MMOL/L (ref 135–147)
T WAVE AXIS: 79 DEGREES
VENTRICULAR RATE: 72 BPM
WBC # BLD AUTO: 9.85 THOUSAND/UL (ref 4.31–10.16)

## 2022-11-29 RX ORDER — POTASSIUM CHLORIDE 29.8 MG/ML
40 INJECTION INTRAVENOUS EVERY 4 HOURS
Status: DISCONTINUED | OUTPATIENT
Start: 2022-11-29 | End: 2022-11-29 | Stop reason: HOSPADM

## 2022-11-29 RX ORDER — POTASSIUM CHLORIDE 29.8 MG/ML
40 INJECTION INTRAVENOUS EVERY 4 HOURS
Status: DISCONTINUED | OUTPATIENT
Start: 2022-11-29 | End: 2022-11-29

## 2022-11-29 RX ORDER — VANCOMYCIN HYDROCHLORIDE 125 MG/1
125 CAPSULE ORAL 4 TIMES DAILY
Qty: 28 CAPSULE | Refills: 0 | Status: SHIPPED | OUTPATIENT
Start: 2022-11-29 | End: 2022-11-29

## 2022-11-29 RX ORDER — POTASSIUM CHLORIDE 400 MEQ/1000ML
80 INJECTION, SOLUTION INTRAVENOUS DAILY
Qty: 2800 ML | Refills: 0 | Status: SHIPPED | OUTPATIENT
Start: 2022-11-29

## 2022-11-29 RX ADMIN — CALCIUM 1 TABLET: 500 TABLET ORAL at 08:15

## 2022-11-29 RX ADMIN — AMILORIDE HYDROCLORIDE 5 MG: 5 TABLET ORAL at 08:15

## 2022-11-29 RX ADMIN — POTASSIUM CHLORIDE 40 MEQ: 29.8 INJECTION, SOLUTION INTRAVENOUS at 09:46

## 2022-11-29 RX ADMIN — POTASSIUM CHLORIDE 40 MEQ: 29.8 INJECTION, SOLUTION INTRAVENOUS at 05:58

## 2022-11-29 RX ADMIN — SODIUM CHLORIDE, SODIUM GLUCONATE, SODIUM ACETATE, POTASSIUM CHLORIDE AND MAGNESIUM CHLORIDE 125 ML/HR: 526; 502; 368; 37; 30 INJECTION, SOLUTION INTRAVENOUS at 08:13

## 2022-11-29 RX ADMIN — CALCIUM 1 TABLET: 500 TABLET ORAL at 12:00

## 2022-11-29 RX ADMIN — ONDANSETRON 4 MG: 2 INJECTION INTRAMUSCULAR; INTRAVENOUS at 04:47

## 2022-11-29 RX ADMIN — POTASSIUM CHLORIDE 40 MEQ: 29.8 INJECTION, SOLUTION INTRAVENOUS at 13:46

## 2022-11-29 RX ADMIN — ONDANSETRON 4 MG: 2 INJECTION INTRAMUSCULAR; INTRAVENOUS at 10:24

## 2022-11-29 RX ADMIN — VANCOMYCIN HYDROCHLORIDE 125 MG: 500 INJECTION, POWDER, LYOPHILIZED, FOR SOLUTION INTRAVENOUS at 12:00

## 2022-11-29 RX ADMIN — SODIUM CHLORIDE, SODIUM GLUCONATE, SODIUM ACETATE, POTASSIUM CHLORIDE AND MAGNESIUM CHLORIDE 125 ML/HR: 526; 502; 368; 37; 30 INJECTION, SOLUTION INTRAVENOUS at 00:05

## 2022-11-29 RX ADMIN — VANCOMYCIN HYDROCHLORIDE 125 MG: 500 INJECTION, POWDER, LYOPHILIZED, FOR SOLUTION INTRAVENOUS at 06:01

## 2022-11-29 NOTE — UTILIZATION REVIEW
Initial Clinical Review    Admission: Date/Time/Statement:   Admission Orders (From admission, onward)     Ordered        11/28/22 1551  Place in Observation  Once                      Orders Placed This Encounter   Procedures   • Place in Observation     Standing Status:   Standing     Number of Occurrences:   1     Order Specific Question:   Level of Care     Answer:   Med Surg [16]     ED Arrival Information     Expected   -    Arrival   11/28/2022 14:12    Acuity   Urgent            Means of arrival   Walk-In    Escorted by   Self    Service   Hospitalist    Admission type   Emergency            Arrival complaint   Abnormal Labs,  has c diff           Chief Complaint   Patient presents with   • Evaluation of Abnormal Diagnostic Test     Pt reports N/V/D beginning Saturday  Had blood work today and potassium was 2 4  Reports  was dx with c diff today       Initial Presentation: 44 y o  female with PMH of HTN, hypokalemia, C diff, anxiety who presents with Evaluation of Abnormal Diagnostic Test (Pt reports N/V/D beginning Saturday  Had blood work today and potassium was 2 4  Reports  was dx with c diff today  Patient gets weekly labs drawn as an outpatient every Monday and was notified to come to the emergency department following abnormal potassium value as an outpatient  Of note patient's  tested positive for C diff and given patient's history will be treating empirically until stool sample returned with results  Plan: Observation for hypokalemia, N/V/D: continue home IV KCl 80 mEq daily, telemetry, IV fluids, Nephrology consult, po vanco, C abhinav panel, Karrie, contact precautions  11/29:    Nephrology consult: After discussion with the hospitalist, will provide the patient with 160 mEq of IV potassium chloride infusions today  Of note, the patient received 20 mEq in the emergency department, +40 mEq yesterday  This morning's serum potassium remains at 2 3 millimole/L   Reassess potassium at around 4:00 p m  today  Await for C diff toxin to come back      ED Triage Vitals   Temperature Pulse Respirations Blood Pressure SpO2   11/28/22 1433 11/28/22 1433 11/28/22 1433 11/28/22 1433 11/28/22 1433   98 °F (36 7 °C) 79 16 128/70 98 %      Temp src Heart Rate Source Patient Position - Orthostatic VS BP Location FiO2 (%)   -- 11/28/22 1630 11/28/22 1433 11/28/22 1433 --    Monitor Lying Right arm       Pain Score       11/28/22 1433       2          Wt Readings from Last 1 Encounters:   11/28/22 52 2 kg (115 lb)     Additional Vital Signs:   Date/Time Temp Pulse Resp BP MAP (mmHg) SpO2 O2 Device   11/29/22 0530 -- 80 14 122/60 -- 99 % None (Room air)   11/29/22 0200 -- 73 14 -- -- 100 % None (Room air)   11/28/22 2335 -- 84 16 124/56 81 100 % None (Room air)   11/28/22 2130 -- 82 20 121/70 -- 98 % None (Room air)   11/28/22 1857 -- 80 20 122/73 93 100 % None (Room air)   11/28/22 1630 -- 77 23 Abnormal  118/66 86 100 % None (Room air)       Pertinent Labs/Diagnostic Test Results:     11/28 EKG:  Normal sinus rhythm  Nonspecific ST abnormality  When compared with ECG of 30-APR-2022 19:04,  No significant change was found      Results from last 7 days   Lab Units 11/29/22  0451 11/28/22  1507   WBC Thousand/uL 9 85 9 72   HEMOGLOBIN g/dL 11 1* 11 3*   HEMATOCRIT % 34 1* 34 7*   PLATELETS Thousands/uL 301 324   NEUTROS ABS Thousands/µL 7 17 6 22         Results from last 7 days   Lab Units 11/29/22  0451 11/28/22  1507 11/28/22  0842   SODIUM mmol/L 137 136 136   POTASSIUM mmol/L 2 3* 2 3* 2 4*   CHLORIDE mmol/L 105 104 104   CO2 mmol/L 21 22 21   ANION GAP mmol/L 11 10 11   BUN mg/dL 12 13 13   CREATININE mg/dL 1 20 1 16 1 19   EGFR ml/min/1 73sq m 57 59 58   CALCIUM mg/dL 7 9* 8 0* 8 1*   MAGNESIUM mg/dL 1 9 1 9  --      Results from last 7 days   Lab Units 11/29/22  0451   AST U/L 20   ALT U/L 18   ALK PHOS U/L 48   TOTAL PROTEIN g/dL 5 9*   ALBUMIN g/dL 2 7*   TOTAL BILIRUBIN mg/dL 0 25 Results from last 7 days   Lab Units 11/29/22  0451 11/28/22  1507 11/28/22  0842   GLUCOSE RANDOM mg/dL 108 87 84             BETA-HYDROXYBUTYRATE   Date Value Ref Range Status   09/02/2022 0 4 <0 6 mmol/L Final          ED Treatment:   Medication Administration from 11/28/2022 1412 to 11/29/2022 0818       Date/Time Order Dose Route Action     11/28/2022 1718 EST potassium chloride 20 mEq IVPB (premix) 20 mEq Intravenous New Bag     11/28/2022 1534 EST potassium chloride 20 mEq IVPB (premix) 20 mEq Intravenous New Bag     11/28/2022 1511 EST diphenhydrAMINE (BENADRYL) injection 25 mg 25 mg Intravenous Given     11/28/2022 1511 EST droperidol (INAPSINE) injection 1 25 mg 1 25 mg Intravenous Given     11/28/2022 1644 EST vancomycin (VANCOCIN) oral solution 125 mg 125 mg Oral Given     11/29/2022 0815 EST calcium carbonate (OYSTER SHELL,OSCAL) 500 mg tablet 1 tablet 1 tablet Oral Given     11/29/2022 0815 EST AMILoride tablet 5 mg 5 mg Oral Given     11/28/2022 2139 EST amitriptyline (ELAVIL) tablet 20 mg 20 mg Oral Given     11/28/2022 2139 EST escitalopram (LEXAPRO) tablet 20 mg 20 mg Oral Given     11/29/2022 0447 EST ondansetron (ZOFRAN) injection 4 mg 4 mg Intravenous Given     11/28/2022 1717 EST ondansetron (ZOFRAN) injection 4 mg 4 mg Intravenous Given     11/29/2022 0813 EST multi-electrolyte (PLASMALYTE-A/ISOLYTE-S PH 7 4) IV solution 125 mL/hr Intravenous New Bag     11/29/2022 0005 EST multi-electrolyte (PLASMALYTE-A/ISOLYTE-S PH 7 4) IV solution 125 mL/hr Intravenous New Bag     11/29/2022 0601 EST vancomycin (VANCOCIN) oral solution 125 mg 125 mg Oral Given     11/28/2022 2335 EST vancomycin (VANCOCIN) oral solution 125 mg 125 mg Oral Given     11/28/2022 2048 EST prochlorperazine (COMPAZINE) tablet 5 mg 5 mg Oral Given     11/28/2022 2139 EST potassium chloride 20 mEq IVPB (premix) 20 mEq Intravenous New Bag     11/28/2022 1942 EST potassium chloride 20 mEq IVPB (premix) 20 mEq Intravenous New Bag 11/29/2022 0558 EST potassium chloride 40 mEq IVPB (premix) 40 mEq Intravenous New Bag        Past Medical History:   Diagnosis Date   • C  difficile colitis    • COVID-19     1/2022- pt denies long covid   • DUB (dysfunctional uterine bleeding)    • H  pylori infection    • Hypertension    • Hypokalemia    • Left lower quadrant abdominal pain 08/17/2020   • Migraine    • Psychiatric disorder     Anxiety   • Rectal bleeding    • Renal disorder    • Rhabdomyolysis    • Thrombosed external hemorrhoid      Present on Admission:  • Hypokalemia  • Nausea vomiting and diarrhea      Admitting Diagnosis: Abnormal laboratory test result [R89 9]  Age/Sex: 44 y o  female  Admission Orders:  Scheduled Medications:  AMILoride, 5 mg, Oral, Daily  amitriptyline, 20 mg, Oral, HS  calcium carbonate, 1 tablet, Oral, TID With Meals  escitalopram, 20 mg, Oral, HS  ferrous sulfate, 325 mg, Oral, Every Other Day  potassium chloride, 40 mEq, Intravenous, Early Morning   And  potassium chloride, 40 mEq, Intravenous, Daily  vancomycin, 125 mg, Oral, Q6H MIKE      Continuous IV Infusions:  multi-electrolyte, 125 mL/hr, Intravenous, Continuous      PRN Meds:  acetaminophen, 650 mg, Oral, Q6H PRN  calcium carbonate, 1,000 mg, Oral, Daily PRN  ondansetron, 4 mg, Intravenous, Q6H PRN  prochlorperazine, 5 mg, Oral, Q8H PRN        IP CONSULT TO NEPHROLOGY    Network Utilization Review Department  ATTENTION: Please call with any questions or concerns to [phone] and carefully listen to the prompts so that you are directed to the right person  All voicemails are confidential   Nick Norton all requests for admission clinical reviews, approved or denied determinations and any other requests to dedicated fax number below belonging to the campus where the patient is receiving treatment   List of dedicated fax numbers for the Facilities:  [address] DENIALS (Administrative/Medical Necessity) [phone]   [address] (Maternity/NICU/Pediatrics) Angela Talavera [address] Marie Anderson  [phone]   [address] [place] [address] [facility] [address] [facility] 134 8[address] [facility] [phone]

## 2022-11-29 NOTE — CONSULTS
Consultation - Nephrology   Neris Jones 44 y o  female MRN: [id number]  Unit/Bed#: ED 10 Encounter: [id number]      A/P:  1  Hypokalemia   After discussion with the hospitalist, will provide the patient with 160 mEq of IV potassium chloride infusions today  Of note, the patient received 20 mEq in the emergency department, +40 mEq yesterday  This morning's serum potassium remains at 2 3 millimole/L  Reassess potassium at around 4:00 p m  today, so long as the patient is not experiencing nausea vomiting or diarrhea, and her serum potassium is greater than 3 millimole/L, she should be okay to be discharged home  Will need to closely monitor  2  Nausea vomiting diarrhea   Appears to be improving, continue to monitor and offer supportive care in the meantime  Patient will likely benefit from oral vancomycin upon discharge given that her  is positive for C diff  Await for C diff toxin to come back  3  History of gastric bypass surgery       Thank you for allowing us to participate in the care of your patient  Please feel free to contact us regarding the care of this patient, or any other questions/concerns that may be applicable      Patient Active Problem List   Diagnosis   • Hypokalemia   • History of gastric bypass   • Migraine without aura and without status migrainosus, not intractable   • Left-sided weakness   • Hypocalcemia   • Hypophosphatemia   • Anemia   • Vitamin D insufficiency   • Elevated CPK   • Vitamin B12 deficiency   • Cervical lymphadenopathy   • Fatigue   • Paresthesia of both lower extremities   • Paresthesias   • B12 deficiency   • Gastric bypass status for obesity   • GERD (gastroesophageal reflux disease)   • Migraine   • WAGNER (obstructive sleep apnea)   • Other intestinal malabsorption   • Right ovarian cyst   • Chest pain   • Nausea vomiting and diarrhea   • Vertigo   • Stress fracture of right foot with routine healing   • Normal anion gap metabolic acidosis   • Middle ear effusion   • Left lower quadrant abdominal pain   • Abnormal LFTs   • Gas pain   • Acute on chronic abdominal pain   • Cellulitis of chest wall   • Chronic pancreatitis (HCC)   • Open wound / skin tear of right chest wall   • Other hemorrhoids   • Iron deficiency   • Intractable vomiting   • Acute hyponatremia   • History of anxiety   • Psychogenic nonepileptic spells   • Episode of shaking   • Perianal abscess   • Acute pancreatitis       History of Present Illness   Physician Requesting Consult: Alva Martinez MD  Reason for Consult / Principal Problem:  Hypokalemia   Hx and PE limited by:   HPI: Braden Bean is a 44y o  year old female who presented to the emergency department yesterday due to episodes of diarrhea and vomiting which was associated with hypokalemia  We had discussed her case over the phone prior to presentation  As a reminder the patient has known underlying hypokalemia and has daily infusions of 80 mEq of IV potassium chloride via Port-A-Cath, and has been getting her infusions as prescribed with occasional extra doses of potassium according to blood work  More recently, the patient has needed extra potassium infusions  However prior to coming in, the patient was taking care of a nephew who has gastroenteritis, but also recently noted to have her  come back positive for C diff infection  He was to initiate oral vancomycin this morning from the pharmacy  At this time, the patient continues to have occasional emesis, the last 1 occurred last night, this morning the patient has been having small portions of meals has she experiences regurgitation on a regular basis  Patient denies loose bowel movements since presenting to the hospital   Patient denies muscle cramps at this time  History obtained from chart review and the patient    Constitutional ROS- Denies fatigue, fever, chills, night sweats, weight changes    HEENT ROS- Denies history of eye surgeries, glaucoma, headaches or history of trauma, blurred vision     Endocrine ROS- No history diabetes mellitus or thyroid disease  Cardiovascular ROS- Denies chest pain, palpitation, dyspnea exertion, orthopnea, claudication  Pulmonary ROS- Denies history of COPD, asthma  Denies cough, hemoptysis, shortness of breath  GI ROS- as noted above  Hematological ROS- Denies history of easy bruising, blood clots, bleeding or blood transfusions  Genitourinary ROS- Denies recent hematuria, pyuria, flank pain, change in urinary stream, decreased urinary output, increased urinary frequency, nocturia, foamy urine, or urinary incontinence  Lymphatic ROS- Denies lymphadenopathy  Musculoskeletal ROS- Denies history of muscle weakness, joint pain  Dermatological ROS- Denies rash, wounds, ulcers, itching, jaundice  Psychiatric ROS- Denies anxiety, depression, hallucinations, disorientation  Neurological ROS- No stroke or TIA symptoms        Historical Information   Past Medical History:   Diagnosis Date   • C  difficile colitis    • COVID-19     2022- pt denies long covid   • DUB (dysfunctional uterine bleeding)    • H  pylori infection    • Hypertension    • Hypokalemia    • Left lower quadrant abdominal pain 2020   • Migraine    • Psychiatric disorder     Anxiety   • Rectal bleeding    • Renal disorder    • Rhabdomyolysis    • Thrombosed external hemorrhoid      Past Surgical History:   Procedure Laterality Date   • ABDOMINAL SURGERY     • APPENDECTOMY     •  SECTION     • CHOLECYSTECTOMY     • COSMETIC SURGERY      "tummy tuck"   • FL GUIDED CENTRAL VENOUS ACCESS DEVICE INSERTION  2018   • GASTRIC BYPASS     • HYSTERECTOMY     • LAPAROSCOPY     • VA EXCISION THROMBOSED HEMORRHOID, EXTERNAL N/A 2022    Procedure: EXCISION OF THROMBOSED EXTERNAL HEMORRHOID, Excision of perianal skin tag, dranage of perianal abscess, anal fistulatomy;  Surgeon: Anastacio Gutierrez DO;  Location:  MAIN OR;  Service: General   • VA SURG DIAGNOSTIC EXAM, ANORECTAL N/A 9/23/2021    Procedure: ANAL EXAM UNDER ANESTHESIA; HEMORRHOIDECTOMY;  Surgeon: Luly Hi DO;  Location:  MAIN OR;  Service: General   • TUNNELED VENOUS PORT PLACEMENT N/A 12/21/2018    Procedure: INSERTION VENOUS PORT (PORT-A-CATH); Surgeon: Rivas Orozco DO;  Location: [facility];  Service: General     Social History   Social History     Substance and Sexual Activity   Alcohol Use Never   • Alcohol/week: 0 0 standard drinks    Comment: 0     Social History     Substance and Sexual Activity   Drug Use Never     Social History     Tobacco Use   Smoking Status Never   Smokeless Tobacco Never     Family History   Problem Relation Age of Onset   • No Known Problems Mother    • Hypertension Father    • Diabetes Father    • Diabetes Maternal Grandfather        Meds/Allergies   all current active meds have been reviewed, current meds:   Current Facility-Administered Medications   Medication Dose Route Frequency   • acetaminophen (TYLENOL) tablet 650 mg  650 mg Oral Q6H PRN   • AMILoride tablet 5 mg  5 mg Oral Daily   • amitriptyline (ELAVIL) tablet 20 mg  20 mg Oral HS   • calcium carbonate (OYSTER SHELL,OSCAL) 500 mg tablet 1 tablet  1 tablet Oral TID With Meals   • calcium carbonate (TUMS) chewable tablet 1,000 mg  1,000 mg Oral Daily PRN   • escitalopram (LEXAPRO) tablet 20 mg  20 mg Oral HS   • ferrous sulfate tablet 325 mg  325 mg Oral Every Other Day   • ondansetron (ZOFRAN) injection 4 mg  4 mg Intravenous Q6H PRN   • potassium chloride 40 mEq IVPB (premix)  40 mEq Intravenous Q4H   • prochlorperazine (COMPAZINE) tablet 5 mg  5 mg Oral Q8H PRN   • vancomycin (VANCOCIN) oral solution 125 mg  125 mg Oral Q6H [address]    and PTA meds:  (Not in a hospital admission)        Allergies   Allergen Reactions   • Nsaids Other (See Comments)     Other reaction(s): Other (Please comment)  Should not take s/p bariatric surgery  Other reaction(s):  Other (See Comments)  Should not take as per prior records  Should not take s/p bariatric surgery  Has hx of gastric bypass     • Prednisone      Nausea, vomiting, diarrhea       Objective     Intake/Output Summary (Last 24 hours) at 11/29/2022 1140  Last data filed at 11/29/2022 0954  Gross per 24 hour   Intake 1410 42 ml   Output --   Net 1410 42 ml       Invasive Devices:        Physical Exam      I/O last 3 completed shifts: In: 100 [IV Piggyback:100]  Out: -     Vitals:    11/29/22 1030   BP:    Pulse: 87   Resp: 16   Temp:    SpO2:        General Appearance:    No acute distress  Cooperative  Appears stated age  Head:    Normocephalic  Atraumatic  Normal jaw occlusion  Eyes:    Lids, conjunctiva normal  No scleral icterus  Ears:    Normal external ears  Nose:   Nares normal  No drainage  Mouth:   Lips, tongue normal  Mucosa normal  Phonation normal    Neck:   Supple  Symmetrical    Back:     Symmetric  No CVA tenderness  Lungs:     Normal respiratory effort  Clear to auscultation bilaterally  Chest wall:    No tenderness or deformity  Heart:    Regular rate and rhythm  Normal S1 and S2  No murmur  No JVD  No edema  Abdomen:     Soft  Non-tender  Bowel sounds active  Genitourinary:   No Byrd catheter present  Extremities:   Extremities normal  Atraumatic  No cyanosis  Skin:   Warm and dry  No pallor, jaundice, rash, ecchymoses  Neurologic:   Alert and oriented to person, place, time  No focal deficit  Current Weight: Weight - Scale: 52 2 kg (115 lb)  First Weight: Weight - Scale: 52 2 kg (115 lb)    Lab Results:  I have personally reviewed pertinent labs      CBC:   Lab Results   Component Value Date    WBC 9 85 11/29/2022    HGB 11 1 (L) 11/29/2022    HCT 34 1 (L) 11/29/2022    MCV 87 11/29/2022     11/29/2022    MCH 28 2 11/29/2022    MCHC 32 6 11/29/2022    RDW 14 5 11/29/2022    MPV 9 7 11/29/2022    NRBC 0 11/29/2022     CMP:   Lab Results   Component Value Date    K 2 3 (LL) 11/29/2022     11/29/2022    CO2 21 11/29/2022    BUN 12 11/29/2022    CREATININE 1 20 11/29/2022    CALCIUM 7 9 (L) 11/29/2022    AST 20 11/29/2022    ALT 18 11/29/2022    ALKPHOS 48 11/29/2022    EGFR 57 11/29/2022     Phosphorus: No results found for: PHOS  Magnesium:   Lab Results   Component Value Date    MG 1 9 11/29/2022     Urinalysis: No results found for: Jailyn Safer, SPECGRAV, PHUR, LEUKOCYTESUR, NITRITE, PROTEINUA, GLUCOSEU, KETONESU, BILIRUBINUR, BLOODU  Ionized Calcium: No results found for: CAION  Coagulation: No results found for: PT, INR, APTT  Troponin: No results found for: TROPONINI  ABG: No results found for: PHART, IIY4QLH, PO2ART, SGS8YZM, G9HLJJHS, BEART, SOURCE    Results from last 7 days   Lab Units 11/29/22  0451 11/28/22  1507 11/28/22  0842   POTASSIUM mmol/L 2 3* 2 3* 2 4*   CHLORIDE mmol/L 105 104 104   CO2 mmol/L 21 22 21   BUN mg/dL 12 13 13   CREATININE mg/dL 1 20 1 16 1 19   CALCIUM mg/dL 7 9* 8 0* 8 1*   ALK PHOS U/L 48  --   --    ALT U/L 18  --   --    AST U/L 20  --   --        Radiology review:  No results found  Barak Strauss, DO      This consultation note was produced in part using a dictation device which may document imprecise wording from author's original intent

## 2022-11-29 NOTE — DISCHARGE SUMMARY
[address]  Discharge- Sarah Narayan 1985, 44 y o  female MRN: [id number]  Unit/Bed#: ED 10 Encounter: [id number]  Primary Care Provider: Teresa Olivia MD   Date and time admitted to hospital: 11/28/2022  2:36 PM    * Hypokalemia  Assessment & Plan  Background:  Recommended to come to the emergency department from nephrologist in regard to potassium level of 2 3  · Acute on chronic likely worsened in the setting of GI losses from likely GI illness  · Managed as an OP on 80mEq home IV infusions  · Repleted in the emergency department with 20 mEq IV  · Additional 40mEQ ordered X1  · Continue home IV 80mEq daily with the addition of 80 mEq given in addition to baseline on 11/29  · Magnesium level ordered  · EKG ordered   · Telemetry given electrolyte disturbance times 24 hours  · Isolyte ordered to assist with fluid resuscitation in setting of diarrhea  · Further trend and monitoring as noted below  · Nephrology consulted; to continue to follow as an outpatient    Lab Results   Component Value Date    K 3 5 11/29/2022    K 2 3 (LL) 11/29/2022    K 2 3 (LL) 11/28/2022       Nausea vomiting and diarrhea  Assessment & Plan  · With familial exposure to C diff  · C diff panel  negative  · No need for oral vancomycin at discharge  · Banatrol ordered   · Antiemetics PRN   · Maintain contact and hand hygiene precaution discontinued  · Isolyte ordered for fluid resuscitation    History of gastric bypass  Assessment & Plan  · Ongoing outpatient follow-up with bariatric surgery      Discharging Physician / Practitioner: ARIADNA Landers  PCP: Teresa Olivia MD  Admission Date:   Admission Orders (From admission, onward)     Ordered        11/28/22 1551  Place in Observation  Once                      Discharge Date: 11/29/22    Medical Problems     Resolved Problems  Date Reviewed: 11/29/2022   None         Consultations During Hospital Stay:  · IP CONSULT TO NEPHROLOGY    Procedures Performed:   · No results found  Significant Findings / Test Results:   · As noted above    Incidental Findings:   · None     Test Results Pending at Discharge (will require follow up): · None     Outpatient Tests Requested:  · At request of primary nephrology team    Complications:  None    Past Medical History:   Diagnosis Date   • C  difficile colitis    • COVID-19     1/2022- pt denies long covid   • DUB (dysfunctional uterine bleeding)    • H  pylori infection    • Hypertension    • Hypokalemia    • Left lower quadrant abdominal pain 08/17/2020   • Migraine    • Psychiatric disorder     Anxiety   • Rectal bleeding    • Renal disorder    • Rhabdomyolysis    • Thrombosed external hemorrhoid        Reason for Admission: Evaluation of Abnormal Diagnostic Test (Pt reports N/V/D beginning Saturday  Had blood work today and potassium was 2 4  Reports  was dx with c diff today)       Hospital Course: Sarah Narayan is a 44 y o  female patient with past medical history as noted above who originally presented to the hospital on 11/28/2022 due to Evaluation of Abnormal Diagnostic Test (Pt reports N/V/D beginning Saturday  Had blood work today and potassium was 2 4  Reports  was dx with c diff today)       Patient underwent normal weekly blood work and was told to come the emergency department given hypokalemia  Known chronic problem and typically receives IV 80 mEq at home daily  Underwent further infusion and hospital at 160 mEq with response as noted above  C diff was negative and nausea, vomiting, and diarrhea improved  To continue follow-up with Nephrology per regularly scheduled appointments and monitoring  Please see above list of diagnoses and related plan for additional information  Condition at Discharge: stable     Discharge Day Visit / Exam:     Subjective:  Patient reports improvement in nausea and vomiting as well as diarrhea    Vitals: Blood Pressure: 108/53 (11/29/22 1455)  Pulse: 88 (11/29/22 1455)  Temperature: 98 5 °F (36 9 °C) (11/29/22 1455)  Temp Source: Oral (11/29/22 1455)  Respirations: 16 (11/29/22 1315)  Weight - Scale: 52 2 kg (115 lb) (11/28/22 1433)  SpO2: 100 % (11/29/22 1455)  Exam:   Physical Exam  Constitutional:       Appearance: Normal appearance  She is not ill-appearing  Cardiovascular:      Rate and Rhythm: Normal rate and regular rhythm  Pulses: Normal pulses  Heart sounds: Normal heart sounds  Pulmonary:      Effort: Pulmonary effort is normal       Breath sounds: Normal breath sounds  Abdominal:      General: Abdomen is flat  Bowel sounds are normal       Palpations: Abdomen is soft  Skin:     General: Skin is warm and dry  Capillary Refill: Capillary refill takes less than 2 seconds  Neurological:      Mental Status: She is alert and oriented to person, place, and time  Mental status is at baseline  Psychiatric:         Mood and Affect: Mood normal          Behavior: Behavior normal          Discussion with Family:  Decline    Discharge instructions/Information to patient and family:   See after visit summary for information provided to patient and family  Provisions for Follow-Up Care:  See after visit summary for information related to follow-up care and any pertinent home health orders  Disposition:     Home    Discharge Statement:  I spent 45 minutes discharging the patient  This time was spent on the day of discharge  I had direct contact with the patient on the day of discharge  Greater than 50% of the total time was spent examining patient, answering all patient questions, arranging and discussing plan of care with patient as well as directly providing post-discharge instructions  Additional time then spent on discharge activities  Discharge Medications:  See after visit summary for reconciled discharge medications provided to patient and family        ** Please Note: This note has been constructed using a voice recognition system **

## 2022-11-29 NOTE — ED NOTES
Patient states nausea improved  Willing to attempt to eat provided lunch        Hang Willams RN  11/29/22 8620

## 2022-11-29 NOTE — ED NOTES
Discharge instructions reviewed with pt, pt verbalized understanding, pt asking how to protect herself from c diff as her  currently has, educated on c diff contact precautions and critical hand hygiene, pt verbalized understanding of same, line flushed and cap placed on port line, pt to be discharged with port accessed to continue home infusions as prescribed       Chantal Reveles RN  11/29/22 2337

## 2022-11-29 NOTE — ASSESSMENT & PLAN NOTE
Background:  Recommended to come to the emergency department from nephrologist in regard to potassium level of 2 3  · Acute on chronic likely worsened in the setting of GI losses from likely GI illness  · Managed as an OP on 80mEq home IV infusions  · Repleted in the emergency department with 20 mEq IV  · Additional 40mEQ ordered X1  · Continue home IV 80mEq daily with the addition of 80 mEq given in addition to baseline on 11/29  · Magnesium level ordered  · EKG ordered   · Telemetry given electrolyte disturbance times 24 hours  · Isolyte ordered to assist with fluid resuscitation in setting of diarrhea  · Further trend and monitoring as noted below  · Nephrology consulted; to continue to follow as an outpatient    Lab Results   Component Value Date    K 3 5 11/29/2022    K 2 3 (LL) 11/29/2022    K 2 3 (LL) 11/28/2022

## 2022-11-29 NOTE — ASSESSMENT & PLAN NOTE
· With familial exposure to C diff  · C diff panel  negative  · No need for oral vancomycin at discharge  · Banatrol ordered   · Antiemetics PRN   · Maintain contact and hand hygiene precaution discontinued  · Isolyte ordered for fluid resuscitation

## 2022-12-05 ENCOUNTER — APPOINTMENT (OUTPATIENT)
Dept: LAB | Facility: HOSPITAL | Age: 37
End: 2022-12-05

## 2022-12-05 DIAGNOSIS — E87.6 HYPOKALEMIA: ICD-10-CM

## 2022-12-05 LAB
ANION GAP SERPL CALCULATED.3IONS-SCNC: 12 MMOL/L (ref 4–13)
BUN SERPL-MCNC: 9 MG/DL (ref 5–25)
CALCIUM SERPL-MCNC: 7.9 MG/DL (ref 8.3–10.1)
CHLORIDE SERPL-SCNC: 104 MMOL/L (ref 96–108)
CO2 SERPL-SCNC: 22 MMOL/L (ref 21–32)
CREAT SERPL-MCNC: 1.19 MG/DL (ref 0.6–1.3)
GFR SERPL CREATININE-BSD FRML MDRD: 58 ML/MIN/1.73SQ M
GLUCOSE SERPL-MCNC: 83 MG/DL (ref 65–140)
POTASSIUM SERPL-SCNC: 2.8 MMOL/L (ref 3.5–5.3)
SODIUM SERPL-SCNC: 138 MMOL/L (ref 135–147)

## 2022-12-06 ENCOUNTER — APPOINTMENT (OUTPATIENT)
Dept: LAB | Facility: HOSPITAL | Age: 37
End: 2022-12-06

## 2022-12-06 DIAGNOSIS — E87.6 HYPOKALEMIA: ICD-10-CM

## 2022-12-06 LAB
ANION GAP SERPL CALCULATED.3IONS-SCNC: 10 MMOL/L (ref 4–13)
BUN SERPL-MCNC: 9 MG/DL (ref 5–25)
CALCIUM SERPL-MCNC: 7.6 MG/DL (ref 8.3–10.1)
CHLORIDE SERPL-SCNC: 105 MMOL/L (ref 96–108)
CO2 SERPL-SCNC: 23 MMOL/L (ref 21–32)
CREAT SERPL-MCNC: 1.06 MG/DL (ref 0.6–1.3)
GFR SERPL CREATININE-BSD FRML MDRD: 66 ML/MIN/1.73SQ M
GLUCOSE P FAST SERPL-MCNC: 104 MG/DL (ref 65–99)
POTASSIUM SERPL-SCNC: 3 MMOL/L (ref 3.5–5.3)
SODIUM SERPL-SCNC: 138 MMOL/L (ref 135–147)

## 2022-12-06 NOTE — SOCIAL WORK
7/8/19 Pt was discharged yesterday/ Follow up appointments reviewed with patient with good understanding  0 = swallows foods/liquids without difficulty

## 2022-12-13 ENCOUNTER — HOSPITAL ENCOUNTER (EMERGENCY)
Facility: HOSPITAL | Age: 37
Discharge: HOME | End: 2022-12-13
Attending: EMERGENCY MEDICINE

## 2022-12-13 ENCOUNTER — APPOINTMENT (EMERGENCY)
Dept: CT IMAGING | Facility: HOSPITAL | Age: 37
End: 2022-12-13

## 2022-12-13 VITALS
WEIGHT: 111.33 LBS | HEART RATE: 95 BPM | TEMPERATURE: 98.2 F | SYSTOLIC BLOOD PRESSURE: 134 MMHG | BODY MASS INDEX: 19.72 KG/M2 | RESPIRATION RATE: 20 BRPM | OXYGEN SATURATION: 99 % | DIASTOLIC BLOOD PRESSURE: 78 MMHG

## 2022-12-13 DIAGNOSIS — N20.0 KIDNEY STONE: Primary | ICD-10-CM

## 2022-12-13 LAB
ALBUMIN SERPL BCP-MCNC: 3.2 G/DL (ref 3.5–5)
ALP SERPL-CCNC: 68 U/L (ref 46–116)
ALT SERPL W P-5'-P-CCNC: 19 U/L (ref 12–78)
ANION GAP SERPL CALCULATED.3IONS-SCNC: 11 MMOL/L (ref 4–13)
AST SERPL W P-5'-P-CCNC: 20 U/L (ref 5–45)
BACTERIA UR QL AUTO: ABNORMAL /HPF
BASOPHILS # BLD AUTO: 0.02 THOUSANDS/ÂΜL (ref 0–0.1)
BASOPHILS NFR BLD AUTO: 0 % (ref 0–1)
BILIRUB SERPL-MCNC: 0.35 MG/DL (ref 0.2–1)
BILIRUB UR QL STRIP: ABNORMAL
BUN SERPL-MCNC: 12 MG/DL (ref 5–25)
CALCIUM ALBUM COR SERPL-MCNC: 8.9 MG/DL (ref 8.3–10.1)
CALCIUM SERPL-MCNC: 8.3 MG/DL (ref 8.3–10.1)
CAOX CRY URNS QL MICRO: ABNORMAL /HPF
CHLORIDE SERPL-SCNC: 105 MMOL/L (ref 96–108)
CLARITY UR: CLEAR
CO2 SERPL-SCNC: 21 MMOL/L (ref 21–32)
COLOR UR: YELLOW
CREAT SERPL-MCNC: 1.28 MG/DL (ref 0.6–1.3)
EOSINOPHIL # BLD AUTO: 0.07 THOUSAND/ÂΜL (ref 0–0.61)
EOSINOPHIL NFR BLD AUTO: 1 % (ref 0–6)
ERYTHROCYTE [DISTWIDTH] IN BLOOD BY AUTOMATED COUNT: 15.2 % (ref 11.6–15.1)
FINE GRAN CASTS URNS QL MICRO: ABNORMAL /LPF
GFR SERPL CREATININE-BSD FRML MDRD: 53 ML/MIN/1.73SQ M
GLUCOSE SERPL-MCNC: 93 MG/DL (ref 65–140)
GLUCOSE UR STRIP-MCNC: NEGATIVE MG/DL
HCT VFR BLD AUTO: 41.3 % (ref 34.8–46.1)
HGB BLD-MCNC: 13 G/DL (ref 11.5–15.4)
HGB UR QL STRIP.AUTO: NEGATIVE
IMM GRANULOCYTES # BLD AUTO: 0.06 THOUSAND/UL (ref 0–0.2)
IMM GRANULOCYTES NFR BLD AUTO: 1 % (ref 0–2)
KETONES UR STRIP-MCNC: NEGATIVE MG/DL
LEUKOCYTE ESTERASE UR QL STRIP: NEGATIVE
LYMPHOCYTES # BLD AUTO: 2.59 THOUSANDS/ÂΜL (ref 0.6–4.47)
LYMPHOCYTES NFR BLD AUTO: 42 % (ref 14–44)
MCH RBC QN AUTO: 27.9 PG (ref 26.8–34.3)
MCHC RBC AUTO-ENTMCNC: 31.5 G/DL (ref 31.4–37.4)
MCV RBC AUTO: 89 FL (ref 82–98)
MONOCYTES # BLD AUTO: 0.51 THOUSAND/ÂΜL (ref 0.17–1.22)
MONOCYTES NFR BLD AUTO: 8 % (ref 4–12)
NEUTROPHILS # BLD AUTO: 2.96 THOUSANDS/ÂΜL (ref 1.85–7.62)
NEUTS SEG NFR BLD AUTO: 48 % (ref 43–75)
NITRITE UR QL STRIP: NEGATIVE
NON-SQ EPI CELLS URNS QL MICRO: ABNORMAL /HPF
NRBC BLD AUTO-RTO: 0 /100 WBCS
PH UR STRIP.AUTO: 6 [PH]
PLATELET # BLD AUTO: 336 THOUSANDS/UL (ref 149–390)
PMV BLD AUTO: 9.5 FL (ref 8.9–12.7)
POTASSIUM SERPL-SCNC: 3.5 MMOL/L (ref 3.5–5.3)
PROT SERPL-MCNC: 6.8 G/DL (ref 6.4–8.4)
PROT UR STRIP-MCNC: ABNORMAL MG/DL
RBC # BLD AUTO: 4.66 MILLION/UL (ref 3.81–5.12)
RBC #/AREA URNS AUTO: ABNORMAL /HPF
SODIUM SERPL-SCNC: 137 MMOL/L (ref 135–147)
SP GR UR STRIP.AUTO: >=1.03 (ref 1–1.03)
UROBILINOGEN UR QL STRIP.AUTO: 0.2 E.U./DL
WBC # BLD AUTO: 6.21 THOUSAND/UL (ref 4.31–10.16)
WBC #/AREA URNS AUTO: ABNORMAL /HPF

## 2022-12-13 RX ORDER — MORPHINE SULFATE 4 MG/ML
4 INJECTION, SOLUTION INTRAMUSCULAR; INTRAVENOUS ONCE
Status: COMPLETED | OUTPATIENT
Start: 2022-12-13 | End: 2022-12-13

## 2022-12-13 RX ORDER — ONDANSETRON 2 MG/ML
4 INJECTION INTRAMUSCULAR; INTRAVENOUS ONCE
Status: COMPLETED | OUTPATIENT
Start: 2022-12-13 | End: 2022-12-13

## 2022-12-13 RX ORDER — OXYCODONE HYDROCHLORIDE 5 MG/1
5 TABLET ORAL EVERY 6 HOURS PRN
Qty: 6 TABLET | Refills: 0 | Status: SHIPPED | OUTPATIENT
Start: 2022-12-13 | End: 2022-12-23

## 2022-12-13 RX ADMIN — MORPHINE SULFATE 4 MG: 4 INJECTION INTRAVENOUS at 04:00

## 2022-12-13 RX ADMIN — ONDANSETRON 4 MG: 2 INJECTION INTRAMUSCULAR; INTRAVENOUS at 02:54

## 2022-12-13 RX ADMIN — MORPHINE SULFATE 4 MG: 4 INJECTION INTRAVENOUS at 02:54

## 2022-12-13 RX ADMIN — SODIUM CHLORIDE 1000 ML: 0.9 INJECTION, SOLUTION INTRAVENOUS at 02:54

## 2022-12-13 NOTE — DISCHARGE INSTRUCTIONS
The CAT scan performed today showed a kidney stone inside the bladder  This means you likely recently passed a kidney stone and this was the cause of your pain  Please use the urine strainers provided and please follow-up with the urologist of your choice or the urologist listed below

## 2022-12-13 NOTE — ED PROVIDER NOTES
History  Chief Complaint   Patient presents with   • Flank Pain     Awoke with stabbing pain left flank wrapping around to left lower abd @ midnight  Awoke with left flank pain since midnight  Radiates to the left lower quadrant  No fevers or chills  Complains of dysuria and nausea and vomiting      History provided by:  Patient   used: No    Flank Pain  Pain location:  L flank  Pain quality: sharp    Pain radiates to:  LLQ  Pain severity:  Moderate  Onset quality:  Gradual  Duration:  3 hours  Timing:  Constant  Progression:  Unchanged  Chronicity:  New  Relieved by:  Nothing  Worsened by:  Nothing  Ineffective treatments:  None tried  Associated symptoms: dysuria, nausea and vomiting    Associated symptoms: no chest pain, no chills, no constipation, no cough, no diarrhea, no fever, no hematemesis, no hematochezia, no hematuria, no shortness of breath and no sore throat        Prior to Admission Medications   Prescriptions Last Dose Informant Patient Reported? Taking? AMILoride 5 mg tablet 12/12/2022  No Yes   Sig: Take 1 tablet (5 mg total) by mouth daily   B Complex Vitamins (VITAMIN B COMPLEX PO) 12/12/2022  Yes Yes   Sig: Take 1 capsule by mouth daily     [facility]  No Yes   Sig: by Does not apply route 2 (two) times a week Port care per [employer], Disp, (SYRINGE 3CC/25GX5/8") 25G X 5/8" 3 ML MISC 12/12/2022  No Yes   Sig: Use once monthly for B12 injection     amitriptyline (ELAVIL) 10 mg tablet 12/12/2022  No Yes   Sig: Take 2 tablets (20 mg total) by mouth daily at bedtime   calcium carbonate (OYSTER SHELL,OSCAL) 500 mg 12/12/2022  No Yes   Sig: Take 1 tablet by mouth 3 (three) times a day with meals   cyanocobalamin 1,000 mcg/mL Past Month  No Yes   Sig: Inject 1 mL (1,000 mcg total) into a muscle every 30 (thirty) days   ergocalciferol (ERGOCALCIFEROL) 1 25 MG (23358 UT) capsule 12/12/2022  No Yes   Sig: Take 1 capsule (50,000 Units total) by mouth once a week  and    escitalopram (LEXAPRO) 10 mg tablet 2022  Yes Yes   Sig: Take 20 mg by mouth daily at bedtime    ferrous sulfate 325 (65 Fe) mg tablet 2022  Yes Yes   Sig: Take 325 mg by mouth every other day Last took 3/9/20    ondansetron (ZOFRAN) 4 mg tablet 2022  No Yes   Sig: Take 1 tablet (4 mg total) by mouth every 8 (eight) hours as needed for nausea or vomiting   potassium chloride 0 4 mEq/mL 2022  No Yes   Sig: Inject 200 mL (80 mEq total) into a catheter in a vein daily 80 meq in 1 litre bag to be infused daily over 8 hours      Continue as ordered prior to admission      Facility-Administered Medications: None       Past Medical History:   Diagnosis Date   • C  difficile colitis    • COVID-19     2022- pt denies long covid   • DUB (dysfunctional uterine bleeding)    • H  pylori infection    • Hypertension    • Hypokalemia    • Left lower quadrant abdominal pain 2020   • Migraine    • Psychiatric disorder     Anxiety   • Rectal bleeding    • Renal disorder    • Rhabdomyolysis    • Thrombosed external hemorrhoid        Past Surgical History:   Procedure Laterality Date   • ABDOMINAL SURGERY     • APPENDECTOMY     •  SECTION     • CHOLECYSTECTOMY     • COSMETIC SURGERY      "tummy tuck"   • FL GUIDED CENTRAL VENOUS ACCESS DEVICE INSERTION  2018   • GASTRIC BYPASS     • HYSTERECTOMY     • LAPAROSCOPY     • TN ANRCT XM SURG REQ ANES GENERAL SPI/EDRL DX N/A 2021    Procedure: ANAL EXAM UNDER ANESTHESIA; HEMORRHOIDECTOMY;  Surgeon: Rubi Alejo DO;  Location:  MAIN OR;  Service: General   • TN EXC THROMBOSED HEMORRHOID Jayce Lopez N/A 2022    Procedure: EXCISION OF THROMBOSED EXTERNAL HEMORRHOID, Excision of perianal skin tag, dranage of perianal abscess, anal fistulatomy;  Surgeon: Rubi Alejo DO;  Location: [facility];  Service: General   • TUNNELED VENOUS PORT PLACEMENT N/A 2018    Procedure: INSERTION VENOUS PORT (PORT-A-CATH); Surgeon: Scott Herndon DO;  Location: [facility];  Service: General       Family History   Problem Relation Age of Onset   • No Known Problems Mother    • Hypertension Father    • Diabetes Father    • Diabetes Maternal Grandfather      I have reviewed and agree with the history as documented  E-Cigarette/Vaping   • E-Cigarette Use Never User      E-Cigarette/Vaping Substances     Social History     Tobacco Use   • Smoking status: Never   • Smokeless tobacco: Never   Vaping Use   • Vaping Use: Never used   Substance Use Topics   • Alcohol use: Never     Alcohol/week: 0 0 standard drinks     Comment: 0   • Drug use: Never       Review of Systems   Constitutional: Negative for chills and fever  HENT: Negative for ear pain, hearing loss, sore throat, trouble swallowing and voice change  Eyes: Negative for pain and discharge  Respiratory: Negative for cough, shortness of breath and wheezing  Cardiovascular: Negative for chest pain and palpitations  Gastrointestinal: Positive for nausea and vomiting  Negative for abdominal pain, blood in stool, constipation, diarrhea, hematemesis and hematochezia  Genitourinary: Positive for dysuria and flank pain  Negative for frequency and hematuria  Musculoskeletal: Negative for joint swelling, neck pain and neck stiffness  Skin: Negative for rash and wound  Neurological: Negative for dizziness, seizures, syncope, facial asymmetry and headaches  Psychiatric/Behavioral: Negative for hallucinations, self-injury and suicidal ideas  All other systems reviewed and are negative  Physical Exam  Physical Exam  Vitals and nursing note reviewed  Constitutional:       General: She is not in acute distress  Appearance: Normal appearance  She is well-developed  Comments: In pain   HENT:      Head: Normocephalic and atraumatic        Right Ear: External ear normal       Left Ear: External ear normal    Eyes: General: No scleral icterus  Right eye: No discharge  Left eye: No discharge  Extraocular Movements: Extraocular movements intact  Conjunctiva/sclera: Conjunctivae normal    Cardiovascular:      Rate and Rhythm: Normal rate and regular rhythm  Heart sounds: Normal heart sounds  No murmur heard  Pulmonary:      Effort: Pulmonary effort is normal       Breath sounds: Normal breath sounds  No wheezing or rales  Abdominal:      General: Bowel sounds are normal  There is no distension  Palpations: Abdomen is soft  Tenderness: There is abdominal tenderness (LLQ without guarding)  There is left CVA tenderness  There is no right CVA tenderness, guarding or rebound  Musculoskeletal:         General: No deformity  Normal range of motion  Cervical back: Normal range of motion and neck supple  Skin:     General: Skin is warm and dry  Findings: No rash  Neurological:      General: No focal deficit present  Mental Status: She is alert and oriented to person, place, and time  Cranial Nerves: No cranial nerve deficit  Psychiatric:         Mood and Affect: Mood normal          Behavior: Behavior normal          Thought Content:  Thought content normal          Judgment: Judgment normal          Vital Signs  ED Triage Vitals [12/13/22 0231]   Temperature Pulse Respirations Blood Pressure SpO2   98 2 °F (36 8 °C) 95 20 134/78 99 %      Temp Source Heart Rate Source Patient Position - Orthostatic VS BP Location FiO2 (%)   Temporal Monitor Sitting Right arm --      Pain Score       10 - Worst Possible Pain           Vitals:    12/13/22 0231   BP: 134/78   Pulse: 95   Patient Position - Orthostatic VS: Sitting         Visual Acuity      ED Medications  Medications   sodium chloride 0 9 % bolus 1,000 mL (1,000 mL Intravenous New Bag 12/13/22 0254)   ondansetron (ZOFRAN) injection 4 mg (4 mg Intravenous Given 12/13/22 0254)   morphine injection 4 mg (4 mg Intravenous Given 12/13/22 0254)   morphine injection 4 mg (4 mg Intravenous Given 12/13/22 0400)       Diagnostic Studies  Results Reviewed     Procedure Component Value Units Date/Time    Urine Microscopic [[id number]]  (Abnormal) Collected: 12/13/22 0245    Lab Status: Final result Specimen: Urine, Clean Catch Updated: 12/13/22 0334     RBC, UA None Seen /hpf      WBC, UA 0-5 /hpf      Epithelial Cells Occasional /hpf      Bacteria, UA Occasional /hpf      Fine granular casts 5-10 /lpf      Ca Oxalate Didi, UA Innumerable /hpf     Narrative:      Concentrated microscopic on low volume urine     Comprehensive metabolic panel [[id number]]  (Abnormal) Collected: 12/13/22 0253    Lab Status: Final result Specimen: Blood from Arm, Right Updated: 12/13/22 0330     Sodium 137 mmol/L      Potassium 3 5 mmol/L      Chloride 105 mmol/L      CO2 21 mmol/L      ANION GAP 11 mmol/L      BUN 12 mg/dL      Creatinine 1 28 mg/dL      Glucose 93 mg/dL      Calcium 8 3 mg/dL      Corrected Calcium 8 9 mg/dL      AST 20 U/L      ALT 19 U/L      Alkaline Phosphatase 68 U/L      Total Protein 6 8 g/dL      Albumin 3 2 g/dL      Total Bilirubin 0 35 mg/dL      eGFR 53 ml/min/1 73sq m     Narrative:      [place] guidelines for Chronic Kidney Disease (CKD):   •  Stage 1 with normal or high GFR (GFR > 90 mL/min/1 73 square meters)  •  Stage 2 Mild CKD (GFR = 60-89 mL/min/1 73 square meters)  •  Stage 3A Moderate CKD (GFR = 45-59 mL/min/1 73 square meters)  •  Stage 3B Moderate CKD (GFR = 30-44 mL/min/1 73 square meters)  •  Stage 4 Severe CKD (GFR = 15-29 mL/min/1 73 square meters)  •  Stage 5 End Stage CKD (GFR <15 mL/min/1 73 square meters)  Note: GFR calculation is accurate only with a steady state creatinine    UA w Reflex to Microscopic w Reflex to Culture [[id number]]  (Abnormal) Collected: 12/13/22 0245    Lab Status: Final result Specimen: Urine, Clean Catch Updated: 12/13/22 0308     Color, UA Yellow     Clarity, UA Clear     Specific Gravity, UA >=1 030     pH, UA 6 0     Leukocytes, UA Negative     Nitrite, UA Negative     Protein,  (2+) mg/dl      Glucose, UA Negative mg/dl      Ketones, UA Negative mg/dl      Urobilinogen, UA 0 2 E U /dl      Bilirubin, UA Small     Occult Blood, UA Negative    CBC and differential [[id number]]  (Abnormal) Collected: 12/13/22 0253    Lab Status: Final result Specimen: Blood from Arm, Right Updated: 12/13/22 0307     WBC 6 21 Thousand/uL      RBC 4 66 Million/uL      Hemoglobin 13 0 g/dL      Hematocrit 41 3 %      MCV 89 fL      MCH 27 9 pg      MCHC 31 5 g/dL      RDW 15 2 %      MPV 9 5 fL      Platelets 366 Thousands/uL      nRBC 0 /100 WBCs      Neutrophils Relative 48 %      Immat GRANS % 1 %      Lymphocytes Relative 42 %      Monocytes Relative 8 %      Eosinophils Relative 1 %      Basophils Relative 0 %      Neutrophils Absolute 2 96 Thousands/µL      Immature Grans Absolute 0 06 Thousand/uL      Lymphocytes Absolute 2 59 Thousands/µL      Monocytes Absolute 0 51 Thousand/µL      Eosinophils Absolute 0 07 Thousand/µL      Basophils Absolute 0 02 Thousands/µL                  CT renal stone study abdomen pelvis wo contrast   Final Result by Jiles Bumpers, MD (12/13 0321)      Mild fullness within the left renal collecting system without evidence of obstructing stone  Findings may represent a recently passed stone  Nonobstructing bilateral nephrolithiasis  2 mm bladder calculus which may represent a recently passed stone  Workstation performed: [id number]                    Procedures  Procedures         ED Course  ED Course as of 12/13/22 0401   Tue Dec 13, 2022   5368 Lab work and urinalysis negative  Calcium oxalate crystals noted in the urine  CT scan shows a 2 mm stone inside the bladder, likely represents recently passed stone  Patient provided with urine strainers and advised to follow-up with urology, outpatient follow-up information provided  SBIRT 22yo+    Flowsheet Row Most Recent Value   SBIRT (23 yo +)    In order to provide better care to our patients, we are screening all of our patients for alcohol and drug use  Would it be okay to ask you these screening questions? Yes Filed at: 12/13/2022 6158   Initial Alcohol Screen: US AUDIT-C     1  How often do you have a drink containing alcohol? 0 Filed at: 12/13/2022 0238   2  How many drinks containing alcohol do you have on a typical day you are drinking? 0 Filed at: 12/13/2022 0238   3b  FEMALE Any Age, or MALE 65+: How often do you have 4 or more drinks on one occassion? 0 Filed at: 12/13/2022 0238   Audit-C Score 0 Filed at: 12/13/2022 6509   SLIME: How many times in the past year have you    Used an illegal drug or used a prescription medication for non-medical reasons? Never Filed at: 12/13/2022 4091                    MDM  Number of Diagnoses or Management Options     Amount and/or Complexity of Data Reviewed  Clinical lab tests: ordered and reviewed  Tests in the radiology section of CPT®: ordered and reviewed  Decide to obtain previous medical records or to obtain history from someone other than the patient: yes  Review and summarize past medical records: yes  Independent visualization of images, tracings, or specimens: yes        Disposition  Final diagnoses:   Kidney stone     Time reflects when diagnosis was documented in both MDM as applicable and the Disposition within this note     Time User Action Codes Description Comment    12/13/2022  3:58 AM Deandra Kelly Add [N20 0] Kidney stone       ED Disposition     ED Disposition   Discharge    Condition   Stable    Date/Time   Tue Dec 13, 2022  3:58 AM    Comment   Randi Aleman discharge to home/self care                 Follow-up Information     Follow up With Specialties Details Why Contact Info Additional [address], [place] Family Medicine In 2 days  [address] [facility] [address] [facility]  [phone]       [address] [facility]  Urology   [address] [facility] [address]  marilyn [facility] [address] [facility] , [address] [place] [address] Ave, Entrance A, 2nd Floor, [facility], [facility] , [place], [address] [phone]          Patient's Medications   Discharge Prescriptions    OXYCODONE (ROXICODONE) 5 IMMEDIATE RELEASE TABLET    Take 1 tablet (5 mg total) by mouth every 6 (six) hours as needed for moderate pain for up to 10 days Max Daily Amount: 20 mg       Start Date: 12/13/2022End Date: 12/23/2022       Order Dose: 5 mg       Quantity: 6 tablet    Refills: 0       No discharge procedures on file      PDMP Review       Value Time User    PDMP Reviewed  Yes 3/6/2022 10:38 AM Elaina Cr MD          ED Provider  Electronically Signed by           Naveed Julian MD  12/13/22 4508

## 2022-12-14 DIAGNOSIS — N20.0 NEPHROLITHIASIS: Primary | ICD-10-CM

## 2022-12-14 RX ORDER — TAMSULOSIN HYDROCHLORIDE 0.4 MG/1
0.4 CAPSULE ORAL DAILY
Qty: 30 CAPSULE | Refills: 1 | Status: SHIPPED | OUTPATIENT
Start: 2022-12-14 | End: 2023-02-09

## 2022-12-20 ENCOUNTER — APPOINTMENT (OUTPATIENT)
Dept: LAB | Facility: HOSPITAL | Age: 37
End: 2022-12-20

## 2022-12-20 DIAGNOSIS — E87.6 HYPOKALEMIA: ICD-10-CM

## 2022-12-20 LAB
ANION GAP SERPL CALCULATED.3IONS-SCNC: 10 MMOL/L (ref 4–13)
BUN SERPL-MCNC: 11 MG/DL (ref 5–25)
CALCIUM SERPL-MCNC: 8.6 MG/DL (ref 8.3–10.1)
CHLORIDE SERPL-SCNC: 105 MMOL/L (ref 96–108)
CO2 SERPL-SCNC: 22 MMOL/L (ref 21–32)
CREAT SERPL-MCNC: 1.07 MG/DL (ref 0.6–1.3)
GFR SERPL CREATININE-BSD FRML MDRD: 66 ML/MIN/1.73SQ M
GLUCOSE SERPL-MCNC: 89 MG/DL (ref 65–140)
POTASSIUM SERPL-SCNC: 4 MMOL/L (ref 3.5–5.3)
SODIUM SERPL-SCNC: 137 MMOL/L (ref 135–147)

## 2022-12-21 ENCOUNTER — APPOINTMENT (EMERGENCY)
Dept: CT IMAGING | Facility: HOSPITAL | Age: 37
End: 2022-12-21

## 2022-12-21 ENCOUNTER — HOSPITAL ENCOUNTER (EMERGENCY)
Facility: HOSPITAL | Age: 37
Discharge: HOME | End: 2022-12-22
Attending: EMERGENCY MEDICINE

## 2022-12-21 DIAGNOSIS — N20.1 URETEROLITHIASIS: Primary | ICD-10-CM

## 2022-12-21 LAB
ALBUMIN SERPL BCP-MCNC: 2.9 G/DL (ref 3.5–5)
ALP SERPL-CCNC: 88 U/L (ref 46–116)
ALT SERPL W P-5'-P-CCNC: 30 U/L (ref 12–78)
ANION GAP SERPL CALCULATED.3IONS-SCNC: 14 MMOL/L (ref 4–13)
AST SERPL W P-5'-P-CCNC: 19 U/L (ref 5–45)
BASOPHILS # BLD AUTO: 0.04 THOUSANDS/ÂΜL (ref 0–0.1)
BASOPHILS NFR BLD AUTO: 0 % (ref 0–1)
BILIRUB SERPL-MCNC: 0.44 MG/DL (ref 0.2–1)
BILIRUB UR QL STRIP: NEGATIVE
BUN SERPL-MCNC: 16 MG/DL (ref 5–25)
CALCIUM ALBUM COR SERPL-MCNC: 8.9 MG/DL (ref 8.3–10.1)
CALCIUM SERPL-MCNC: 8 MG/DL (ref 8.3–10.1)
CHLORIDE SERPL-SCNC: 106 MMOL/L (ref 96–108)
CLARITY UR: ABNORMAL
CO2 SERPL-SCNC: 18 MMOL/L (ref 21–32)
COLOR UR: YELLOW
CREAT SERPL-MCNC: 1.39 MG/DL (ref 0.6–1.3)
EOSINOPHIL # BLD AUTO: 0.02 THOUSAND/ÂΜL (ref 0–0.61)
EOSINOPHIL NFR BLD AUTO: 0 % (ref 0–6)
ERYTHROCYTE [DISTWIDTH] IN BLOOD BY AUTOMATED COUNT: 15.1 % (ref 11.6–15.1)
GFR SERPL CREATININE-BSD FRML MDRD: 48 ML/MIN/1.73SQ M
GLUCOSE SERPL-MCNC: 93 MG/DL (ref 65–140)
GLUCOSE UR STRIP-MCNC: NEGATIVE MG/DL
HCT VFR BLD AUTO: 36.8 % (ref 34.8–46.1)
HGB BLD-MCNC: 11.2 G/DL (ref 11.5–15.4)
HGB UR QL STRIP.AUTO: ABNORMAL
IMM GRANULOCYTES # BLD AUTO: 0.06 THOUSAND/UL (ref 0–0.2)
IMM GRANULOCYTES NFR BLD AUTO: 1 % (ref 0–2)
KETONES UR STRIP-MCNC: ABNORMAL MG/DL
LEUKOCYTE ESTERASE UR QL STRIP: NEGATIVE
LYMPHOCYTES # BLD AUTO: 2.02 THOUSANDS/ÂΜL (ref 0.6–4.47)
LYMPHOCYTES NFR BLD AUTO: 19 % (ref 14–44)
MCH RBC QN AUTO: 27.7 PG (ref 26.8–34.3)
MCHC RBC AUTO-ENTMCNC: 30.4 G/DL (ref 31.4–37.4)
MCV RBC AUTO: 91 FL (ref 82–98)
MONOCYTES # BLD AUTO: 0.72 THOUSAND/ÂΜL (ref 0.17–1.22)
MONOCYTES NFR BLD AUTO: 7 % (ref 4–12)
NEUTROPHILS # BLD AUTO: 8.04 THOUSANDS/ÂΜL (ref 1.85–7.62)
NEUTS SEG NFR BLD AUTO: 73 % (ref 43–75)
NITRITE UR QL STRIP: NEGATIVE
NRBC BLD AUTO-RTO: 0 /100 WBCS
PH UR STRIP.AUTO: 6 [PH]
PLATELET # BLD AUTO: 303 THOUSANDS/UL (ref 149–390)
PMV BLD AUTO: 10 FL (ref 8.9–12.7)
POTASSIUM SERPL-SCNC: 3.4 MMOL/L (ref 3.5–5.3)
PROT SERPL-MCNC: 6.5 G/DL (ref 6.4–8.4)
PROT UR STRIP-MCNC: ABNORMAL MG/DL
RBC # BLD AUTO: 4.05 MILLION/UL (ref 3.81–5.12)
SODIUM SERPL-SCNC: 138 MMOL/L (ref 135–147)
SP GR UR STRIP.AUTO: >=1.03 (ref 1–1.03)
UROBILINOGEN UR QL STRIP.AUTO: 0.2 E.U./DL
WBC # BLD AUTO: 10.9 THOUSAND/UL (ref 4.31–10.16)

## 2022-12-21 RX ORDER — KETOROLAC TROMETHAMINE 30 MG/ML
15 INJECTION, SOLUTION INTRAMUSCULAR; INTRAVENOUS ONCE
Status: COMPLETED | OUTPATIENT
Start: 2022-12-21 | End: 2022-12-21

## 2022-12-21 RX ORDER — ONDANSETRON 2 MG/ML
4 INJECTION INTRAMUSCULAR; INTRAVENOUS ONCE
Status: COMPLETED | OUTPATIENT
Start: 2022-12-21 | End: 2022-12-21

## 2022-12-21 RX ORDER — DROPERIDOL 2.5 MG/ML
1.25 INJECTION, SOLUTION INTRAMUSCULAR; INTRAVENOUS ONCE
Status: COMPLETED | OUTPATIENT
Start: 2022-12-21 | End: 2022-12-21

## 2022-12-21 RX ORDER — MORPHINE SULFATE 10 MG/ML
5 INJECTION, SOLUTION INTRAMUSCULAR; INTRAVENOUS ONCE
Status: COMPLETED | OUTPATIENT
Start: 2022-12-22 | End: 2022-12-22

## 2022-12-21 RX ADMIN — ONDANSETRON 4 MG: 2 INJECTION INTRAMUSCULAR; INTRAVENOUS at 22:42

## 2022-12-21 RX ADMIN — SODIUM CHLORIDE 1000 ML: 0.9 INJECTION, SOLUTION INTRAVENOUS at 23:01

## 2022-12-21 RX ADMIN — DROPERIDOL 1.25 MG: 2.5 INJECTION, SOLUTION INTRAMUSCULAR; INTRAVENOUS at 22:43

## 2022-12-21 RX ADMIN — KETOROLAC TROMETHAMINE 15 MG: 30 INJECTION, SOLUTION INTRAMUSCULAR at 22:42

## 2022-12-21 NOTE — Clinical Note
Sarkis Kapoor was seen and treated in our emergency department on 12/21/2022  Diagnosis:     Juan Meyers  may return to work on return date  She may return on this date: 12/26/2022         If you have any questions or concerns, please don't hesitate to call        Mikael Zamudio DO    ______________________________           _______________          _______________  Hospital Representative                              Date                                Time

## 2022-12-22 VITALS
RESPIRATION RATE: 16 BRPM | OXYGEN SATURATION: 100 % | SYSTOLIC BLOOD PRESSURE: 127 MMHG | WEIGHT: 121.69 LBS | DIASTOLIC BLOOD PRESSURE: 82 MMHG | TEMPERATURE: 97 F | HEART RATE: 78 BPM | BODY MASS INDEX: 21.56 KG/M2

## 2022-12-22 LAB
AMORPH URATE CRY URNS QL MICRO: ABNORMAL /HPF
BACTERIA UR QL AUTO: ABNORMAL /HPF
CAOX CRY URNS QL MICRO: ABNORMAL /HPF
FINE GRAN CASTS URNS QL MICRO: ABNORMAL /LPF
NON-SQ EPI CELLS URNS QL MICRO: ABNORMAL /HPF
RBC #/AREA URNS AUTO: ABNORMAL /HPF
URINE COMMENT: ABNORMAL
WBC #/AREA URNS AUTO: ABNORMAL /HPF

## 2022-12-22 RX ORDER — ONDANSETRON 4 MG/1
4 TABLET, FILM COATED ORAL EVERY 6 HOURS PRN
Qty: 10 TABLET | Refills: 0 | Status: SHIPPED | OUTPATIENT
Start: 2022-12-22

## 2022-12-22 RX ORDER — CEFADROXIL 500 MG/1
500 CAPSULE ORAL EVERY 12 HOURS SCHEDULED
Qty: 10 CAPSULE | Refills: 0 | Status: SHIPPED | OUTPATIENT
Start: 2022-12-22 | End: 2022-12-27

## 2022-12-22 RX ORDER — CEFTRIAXONE 1 G/50ML
1000 INJECTION, SOLUTION INTRAVENOUS ONCE
Status: COMPLETED | OUTPATIENT
Start: 2022-12-22 | End: 2022-12-22

## 2022-12-22 RX ORDER — TAMSULOSIN HYDROCHLORIDE 0.4 MG/1
0.4 CAPSULE ORAL
Qty: 14 CAPSULE | Refills: 0 | Status: SHIPPED | OUTPATIENT
Start: 2022-12-22 | End: 2023-01-05

## 2022-12-22 RX ORDER — OXYCODONE HYDROCHLORIDE 5 MG/1
5 TABLET ORAL EVERY 6 HOURS PRN
Qty: 6 TABLET | Refills: 0 | Status: SHIPPED | OUTPATIENT
Start: 2022-12-22

## 2022-12-22 RX ADMIN — CEFTRIAXONE 1000 MG: 1 INJECTION, SOLUTION INTRAVENOUS at 00:38

## 2022-12-22 RX ADMIN — MORPHINE SULFATE 5 MG: 10 INJECTION, SOLUTION INTRAMUSCULAR; INTRAVENOUS at 00:07

## 2022-12-22 NOTE — ED PROVIDER NOTES
History  Chief Complaint   Patient presents with   • Flank Pain     Had a kidney stone about 1 week ago  R flank pain that wraps around to front  Pain started around 3pm  +n/v     79-year-old female describes right flank pain that is sharp and severe, cute onset with associated nausea and vomiting  Notes are just prior to arrival and similar to prior kidney stone type pain and recently passed a kidney stone, saw stone  No fever or chills  History provided by:  Patient  Flank Pain  Pain location:  R flank  Pain quality: sharp    Pain radiates to:  RLQ  Pain severity:  Severe  Onset quality:  Sudden  Timing:  Constant  Progression:  Worsening  Chronicity:  New  Context: not trauma    Relieved by:  Nothing  Worsened by:  Nothing  Ineffective treatments:  None tried  Associated symptoms: nausea and vomiting    Associated symptoms: no chest pain, no fever and no shortness of breath        Prior to Admission Medications   Prescriptions Last Dose Informant Patient Reported? Taking? AMILoride 5 mg tablet   No No   Sig: Take 1 tablet (5 mg total) by mouth daily   B Complex Vitamins (VITAMIN B COMPLEX PO)   Yes No   Sig: Take 1 capsule by mouth daily     Catheters MISC   No No   Sig: by Does not apply route 2 (two) times a week Port care per [employer], Disp, (SYRINGE 3CC/25GX5/8") 25G X 5/8" 3 ML MISC   No No   Sig: Use once monthly for B12 injection     amitriptyline (ELAVIL) 10 mg tablet   No No   Sig: Take 2 tablets (20 mg total) by mouth daily at bedtime   calcium carbonate (OYSTER SHELL,OSCAL) 500 mg   No No   Sig: Take 1 tablet by mouth 3 (three) times a day with meals   cyanocobalamin 1,000 mcg/mL   No No   Sig: Inject 1 mL (1,000 mcg total) into a muscle every 30 (thirty) days   ergocalciferol (ERGOCALCIFEROL) 1 25 MG (17487 UT) capsule   No No   Sig: Take 1 capsule (50,000 Units total) by mouth once a week Mondays and Thursdays   escitalopram (LEXAPRO) 10 mg tablet   Yes No   Sig: Take 20 mg by mouth daily at bedtime    ferrous sulfate 325 (65 Fe) mg tablet   Yes No   Sig: Take 325 mg by mouth every other day Last took 3/9/20    ondansetron (ZOFRAN) 4 mg tablet   No No   Sig: Take 1 tablet (4 mg total) by mouth every 8 (eight) hours as needed for nausea or vomiting   oxyCODONE (Roxicodone) 5 immediate release tablet   No No   Sig: Take 1 tablet (5 mg total) by mouth every 6 (six) hours as needed for moderate pain for up to 10 days Max Daily Amount: 20 mg   potassium chloride 0 4 mEq/mL   No No   Sig: Inject 200 mL (80 mEq total) into a catheter in a vein daily 80 meq in 1 litre bag to be infused daily over 8 hours      Continue as ordered prior to admission   tamsulosin (FLOMAX) 0 4 mg   No No   Sig: Take 1 capsule (0 4 mg total) by mouth in the morning      Facility-Administered Medications: None       Past Medical History:   Diagnosis Date   • C  difficile colitis    • COVID-19     2022- pt denies long covid   • DUB (dysfunctional uterine bleeding)    • H  pylori infection    • Hypertension    • Hypokalemia    • Left lower quadrant abdominal pain 2020   • Migraine    • Psychiatric disorder     Anxiety   • Rectal bleeding    • Renal disorder    • Rhabdomyolysis    • Thrombosed external hemorrhoid        Past Surgical History:   Procedure Laterality Date   • ABDOMINAL SURGERY     • APPENDECTOMY     •  SECTION     • CHOLECYSTECTOMY     • COSMETIC SURGERY      "tummy tuck"   • FL GUIDED CENTRAL VENOUS ACCESS DEVICE INSERTION  2018   • GASTRIC BYPASS     • HYSTERECTOMY     • LAPAROSCOPY     • [place] EXCISION THROMBOSED HEMORRHOID, EXTERNAL N/A 2022    Procedure: EXCISION OF THROMBOSED EXTERNAL HEMORRHOID, Excision of perianal skin tag, dranage of perianal abscess, anal fistulatomy;  Surgeon: Kobi Lowery DO;  Location:  MAIN OR;  Service: General   • [place] SURG DIAGNOSTIC EXAM, ANORECTAL N/A 2021    Procedure: ANAL EXAM UNDER ANESTHESIA; HEMORRHOIDECTOMY; Surgeon: Abdirahman Bishop DO;  Location:  MAIN OR;  Service: General   • TUNNELED VENOUS PORT PLACEMENT N/A 12/21/2018    Procedure: INSERTION VENOUS PORT (PORT-A-CATH); Surgeon: Yamlieth Rodríguez DO;  Location: [facility];  Service: General       Family History   Problem Relation Age of Onset   • No Known Problems Mother    • Hypertension Father    • Diabetes Father    • Diabetes Maternal Grandfather      I have reviewed and agree with the history as documented  E-Cigarette/Vaping   • E-Cigarette Use Never User      E-Cigarette/Vaping Substances     Social History     Tobacco Use   • Smoking status: Never   • Smokeless tobacco: Never   Vaping Use   • Vaping Use: Never used   Substance Use Topics   • Alcohol use: Never     Alcohol/week: 0 0 standard drinks     Comment: 0   • Drug use: Never       Review of Systems   Constitutional: Negative for fever  Respiratory: Negative for shortness of breath  Cardiovascular: Negative for chest pain  Gastrointestinal: Positive for nausea and vomiting  Genitourinary: Positive for flank pain  All other systems reviewed and are negative  Physical Exam  Physical Exam  Vitals and nursing note reviewed  Constitutional:       Comments: Pleasant, uncomfortable-appearing, holding right flank   HENT:      Head: Normocephalic and atraumatic  Mouth/Throat:      Mouth: Mucous membranes are moist       Pharynx: Oropharynx is clear  Eyes:      Conjunctiva/sclera: Conjunctivae normal       Pupils: Pupils are equal, round, and reactive to light  Cardiovascular:      Rate and Rhythm: Normal rate and regular rhythm  Heart sounds: Normal heart sounds  Pulmonary:      Effort: Pulmonary effort is normal       Breath sounds: Normal breath sounds  Abdominal:      General: Bowel sounds are normal  There is no distension  Palpations: Abdomen is soft  Tenderness: There is abdominal tenderness  There is right CVA tenderness        Comments: Right mid lower abdomen tender, no rebound or guarding   Musculoskeletal:         General: No deformity  Cervical back: Neck supple  Skin:     General: Skin is warm and dry  Neurological:      General: No focal deficit present  Mental Status: She is alert and oriented to person, place, and time  Cranial Nerves: No cranial nerve deficit  Coordination: Coordination normal    Psychiatric:         Behavior: Behavior normal          Thought Content:  Thought content normal          Judgment: Judgment normal          Vital Signs  ED Triage Vitals   Temperature Pulse Respirations Blood Pressure SpO2   12/21/22 2217 12/21/22 2217 12/21/22 2217 12/21/22 2217 12/21/22 2217   (!) 97 °F (36 1 °C) 98 20 124/59 100 %      Temp Source Heart Rate Source Patient Position - Orthostatic VS BP Location FiO2 (%)   12/21/22 2217 12/21/22 2217 12/21/22 2230 12/21/22 2217 --   Temporal Monitor Lying Left arm       Pain Score       12/21/22 2217       10 - Worst Possible Pain           Vitals:    12/21/22 2217 12/21/22 2230 12/21/22 2330 12/22/22 0000   BP: 124/59 126/60 127/82    Pulse: 98 84  78   Patient Position - Orthostatic VS:  Lying Lying          Visual Acuity      ED Medications  Medications   droperidol (INAPSINE) injection 1 25 mg (1 25 mg Intravenous Given 12/21/22 2243)   ondansetron (ZOFRAN) injection 4 mg (4 mg Intravenous Given 12/21/22 2242)   sodium chloride 0 9 % bolus 1,000 mL (0 mL Intravenous Stopped 12/22/22 0007)   ketorolac (TORADOL) injection 15 mg (15 mg Intravenous Given 12/21/22 2242)   morphine injection 5 mg (5 mg Intravenous Given 12/22/22 0007)   cefTRIAXone (ROCEPHIN) IVPB (premix in dextrose) 1,000 mg 50 mL (0 mg Intravenous Stopped 12/22/22 0107)       Diagnostic Studies  Results Reviewed     Procedure Component Value Units Date/Time    Urine Microscopic [[id number]]  (Abnormal) Collected: 12/21/22 2330    Lab Status: Final result Specimen: Urine, Clean Catch Updated: 12/22/22 0019     RBC, UA 10-20 /hpf      WBC, UA 0-5 /hpf      Epithelial Cells Occasional /hpf      Bacteria, UA Moderate /hpf      Fine granular casts 5-10 /lpf      AMORPH URATES Occasional /hpf      Ca Oxalate Didi, UA Innumerable /hpf      URINE COMMENT Concentrated Microscopic on Low Volume Urine    UA w Reflex to Microscopic w Reflex to Culture [[id number]]  (Abnormal) Collected: 12/21/22 2330    Lab Status: Final result Specimen: Urine, Clean Catch Updated: 12/21/22 2345     Color, UA Yellow     Clarity, UA Cloudy     Specific Gravity, UA >=1 030     pH, UA 6 0     Leukocytes, UA Negative     Nitrite, UA Negative     Protein, UA 30 (1+) mg/dl      Glucose, UA Negative mg/dl      Ketones, UA Trace mg/dl      Urobilinogen, UA 0 2 E U /dl      Bilirubin, UA Negative     Occult Blood, UA Moderate    Comprehensive metabolic panel [[id number]]  (Abnormal) Collected: 12/21/22 2301    Lab Status: Final result Specimen: Blood from Hand, Right Updated: 12/21/22 2325     Sodium 138 mmol/L      Potassium 3 4 mmol/L      Chloride 106 mmol/L      CO2 18 mmol/L      ANION GAP 14 mmol/L      BUN 16 mg/dL      Creatinine 1 39 mg/dL      Glucose 93 mg/dL      Calcium 8 0 mg/dL      Corrected Calcium 8 9 mg/dL      AST 19 U/L      ALT 30 U/L      Alkaline Phosphatase 88 U/L      Total Protein 6 5 g/dL      Albumin 2 9 g/dL      Total Bilirubin 0 44 mg/dL      eGFR 48 ml/min/1 73sq m     Narrative:      [place] guidelines for Chronic Kidney Disease (CKD):   •  Stage 1 with normal or high GFR (GFR > 90 mL/min/1 73 square meters)  •  Stage 2 Mild CKD (GFR = 60-89 mL/min/1 73 square meters)  •  Stage 3A Moderate CKD (GFR = 45-59 mL/min/1 73 square meters)  •  Stage 3B Moderate CKD (GFR = 30-44 mL/min/1 73 square meters)  •  Stage 4 Severe CKD (GFR = 15-29 mL/min/1 73 square meters)  •  Stage 5 End Stage CKD (GFR <15 mL/min/1 73 square meters)  Note: GFR calculation is accurate only with a steady state creatinine    CBC and differential [[id number]]  (Abnormal) Collected: 12/21/22 2301    Lab Status: Final result Specimen: Blood from Hand, Right Updated: 12/21/22 2306     WBC 10 90 Thousand/uL      RBC 4 05 Million/uL      Hemoglobin 11 2 g/dL      Hematocrit 36 8 %      MCV 91 fL      MCH 27 7 pg      MCHC 30 4 g/dL      RDW 15 1 %      MPV 10 0 fL      Platelets 478 Thousands/uL      nRBC 0 /100 WBCs      Neutrophils Relative 73 %      Immat GRANS % 1 %      Lymphocytes Relative 19 %      Monocytes Relative 7 %      Eosinophils Relative 0 %      Basophils Relative 0 %      Neutrophils Absolute 8 04 Thousands/µL      Immature Grans Absolute 0 06 Thousand/uL      Lymphocytes Absolute 2 02 Thousands/µL      Monocytes Absolute 0 72 Thousand/µL      Eosinophils Absolute 0 02 Thousand/µL      Basophils Absolute 0 04 Thousands/µL                  CT abdomen pelvis wo contrast   Final Result by Macho Kidd DO (12/22 6956)      Bilateral nephrolithiasis  4 mm obstructing stone in the proximal right ureter with associated mild to moderate right hydroureteronephrosis  Underdistended bladder  Mild circumferential bladder wall thickening noted, possibly exaggerated by underdistention  Consider a cystitis in the appropriate clinical setting  Correlation with the patient's symptoms, laboratory values, and urinalysis    recommended  Status post gastric bypass  No discrete evidence of bowel obstruction  Liquid stool in the colon may suggest an enteritis  Other findings as above  Workstation performed: [id number]                    Procedures  Procedures         ED Course  ED Course as of 12/22/22 0422   Thu Dec 22, 2022   0027 RBC, UA(!): 10-20   0028 WBC, UA: 0-5   0028 Bacteria, UA(!): Moderate   0028 Ca Oxalate Didi, UA(!): Innumerable   0030 Pain controlled, CT results pending, no clinical history for UTI, but with bacturia will treat with antibiotics, mild dinorah / hypokalemia   Patient has port and does own infusions                               SBIRT 20yo+    Flowsheet Row Most Recent Value   SBIRT (25 yo +)    In order to provide better care to our patients, we are screening all of our patients for alcohol and drug use  Would it be okay to ask you these screening questions? No Filed at: 12/21/2022 2341                    MDM    Disposition  Final diagnoses:   Ureterolithiasis     Time reflects when diagnosis was documented in both MDM as applicable and the Disposition within this note     Time User Action Codes Description Comment    12/22/2022 12:40 AM Gabriel Mcpherson Add [N20 1] Ureterolithiasis       ED Disposition     ED Disposition   Discharge    Condition   Stable    Date/Time   Thu Dec 22, 2022 12:40 AM    Comment   Swetha Fierro discharge to home/self care  Follow-up Information     Follow up With Specialties Details Why [address] Jake Babb IV, MD Family Medicine Schedule an appointment as soon as possible for a visit  As needed Lizzy Renae [address]  [phone]      Mary Franco MD Urology In 1 week  [address] [place] [address] [address]  [phone]            Discharge Medication List as of 12/22/2022 12:42 AM      START taking these medications    Details   !! ondansetron (ZOFRAN) 4 mg tablet Take 1 tablet (4 mg total) by mouth every 6 (six) hours as needed for nausea or vomiting, Starting Thu 12/22/2022, Normal      !! oxyCODONE (ROXICODONE) 5 immediate release tablet Take 1 tablet (5 mg total) by mouth every 6 (six) hours as needed for moderate pain for up to 6 doses Max Daily Amount: 20 mg, Starting Thu 12/22/2022, Normal      !! tamsulosin (FLOMAX) 0 4 mg Take 1 capsule (0 4 mg total) by mouth daily with dinner for 14 days, Starting Thu 12/22/2022, Until Thu 1/5/2023, Normal       !! - Potential duplicate medications found  Please discuss with provider        CONTINUE these medications which have NOT CHANGED    Details AMILoride 5 mg tablet Take 1 tablet (5 mg total) by mouth daily, Starting Wed 2/20/2019, Normal      amitriptyline (ELAVIL) 10 mg tablet Take 2 tablets (20 mg total) by mouth daily at bedtime, Starting Sun 5/1/2022, Normal      B Complex Vitamins (VITAMIN B COMPLEX PO) Take 1 capsule by mouth daily  , Historical Med      calcium carbonate (OYSTER SHELL,OSCAL) 500 mg Take 1 tablet by mouth 3 (three) times a day with meals, Starting Mon 4/25/2022, Until Tue 12/13/2022, Normal      Catheters MISC by Does not apply route 2 (two) times a week Port care per [employer], Starting Thu 5/7/2020, No Print      cyanocobalamin 1,000 mcg/mL Inject 1 mL (1,000 mcg total) into a muscle every 30 (thirty) days, Starting Mon 5/4/2020, Normal      ergocalciferol (ERGOCALCIFEROL) 1 25 MG (58693 UT) capsule Take 1 capsule (50,000 Units total) by mouth once a week Mondays and Thursdays, Starting Fri 9/17/2021, No Print      escitalopram (LEXAPRO) 10 mg tablet Take 20 mg by mouth daily at bedtime , Historical Med      ferrous sulfate 325 (65 Fe) mg tablet Take 325 mg by mouth every other day Last took 3/9/20 , Historical Med      !! ondansetron (ZOFRAN) 4 mg tablet Take 1 tablet (4 mg total) by mouth every 8 (eight) hours as needed for nausea or vomiting, Starting Sun 3/6/2022, Normal      !! oxyCODONE (Roxicodone) 5 immediate release tablet Take 1 tablet (5 mg total) by mouth every 6 (six) hours as needed for moderate pain for up to 10 days Max Daily Amount: 20 mg, Starting Tue 12/13/2022, Until Fri 12/23/2022 at 2359, Normal      potassium chloride 0 4 mEq/mL Inject 200 mL (80 mEq total) into a catheter in a vein daily 80 meq in 1 litre bag to be infused daily over 8 hours      Continue as ordered prior to admission, Starting Tue 11/29/2022, Print      Syringe/Needle, Disp, (SYRINGE 3CC/25GX5/8") 25G X 5/8" 3 ML MISC Use once monthly for B12 injection  , Normal      !! tamsulosin (FLOMAX) 0 4 mg Take 1 capsule (0 4 mg total) by mouth in the morning, Starting Wed 12/14/2022, Normal       !! - Potential duplicate medications found  Please discuss with provider  No discharge procedures on file      PDMP Review       Value Time User    PDMP Reviewed  Yes 3/6/2022 10:38 AM Michael Joel MD          ED Provider  Electronically Signed by           Janet Iverson DO  12/22/22 0429

## 2023-01-02 ENCOUNTER — HOSPITAL ENCOUNTER (OUTPATIENT)
Facility: HOSPITAL | Age: 38
Setting detail: OBSERVATION
End: 2023-01-03
Attending: EMERGENCY MEDICINE | Admitting: FAMILY MEDICINE

## 2023-01-02 ENCOUNTER — APPOINTMENT (OUTPATIENT)
Dept: LAB | Facility: HOSPITAL | Age: 38
End: 2023-01-02

## 2023-01-02 ENCOUNTER — APPOINTMENT (EMERGENCY)
Dept: CT IMAGING | Facility: HOSPITAL | Age: 38
End: 2023-01-02

## 2023-01-02 DIAGNOSIS — R74.01 TRANSAMINITIS: ICD-10-CM

## 2023-01-02 DIAGNOSIS — E87.6 HYPOKALEMIA: ICD-10-CM

## 2023-01-02 DIAGNOSIS — K85.90 ACUTE PANCREATITIS WITHOUT INFECTION OR NECROSIS, UNSPECIFIED PANCREATITIS TYPE: ICD-10-CM

## 2023-01-02 DIAGNOSIS — N20.1 LEFT URETERAL STONE: Primary | ICD-10-CM

## 2023-01-02 LAB
ALBUMIN SERPL BCP-MCNC: 3 G/DL (ref 3.5–5)
ALP SERPL-CCNC: 75 U/L (ref 46–116)
ALT SERPL W P-5'-P-CCNC: 33 U/L (ref 12–78)
ANION GAP SERPL CALCULATED.3IONS-SCNC: 10 MMOL/L (ref 4–13)
ANION GAP SERPL CALCULATED.3IONS-SCNC: 7 MMOL/L (ref 4–13)
AST SERPL W P-5'-P-CCNC: 43 U/L (ref 5–45)
BACTERIA UR QL AUTO: ABNORMAL /HPF
BASOPHILS # BLD AUTO: 0.06 THOUSANDS/ÂΜL (ref 0–0.1)
BASOPHILS NFR BLD AUTO: 1 % (ref 0–1)
BILIRUB SERPL-MCNC: 0.27 MG/DL (ref 0.2–1)
BILIRUB UR QL STRIP: NEGATIVE
BUN SERPL-MCNC: 13 MG/DL (ref 5–25)
BUN SERPL-MCNC: 15 MG/DL (ref 5–25)
CALCIUM ALBUM COR SERPL-MCNC: 8.6 MG/DL (ref 8.3–10.1)
CALCIUM SERPL-MCNC: 7.8 MG/DL (ref 8.3–10.1)
CALCIUM SERPL-MCNC: 7.9 MG/DL (ref 8.3–10.1)
CAOX CRY URNS QL MICRO: ABNORMAL /HPF
CHLORIDE SERPL-SCNC: 106 MMOL/L (ref 96–108)
CHLORIDE SERPL-SCNC: 106 MMOL/L (ref 96–108)
CLARITY UR: ABNORMAL
CO2 SERPL-SCNC: 23 MMOL/L (ref 21–32)
CO2 SERPL-SCNC: 25 MMOL/L (ref 21–32)
COLOR UR: YELLOW
CREAT SERPL-MCNC: 1 MG/DL (ref 0.6–1.3)
CREAT SERPL-MCNC: 1.2 MG/DL (ref 0.6–1.3)
EOSINOPHIL # BLD AUTO: 0.22 THOUSAND/ÂΜL (ref 0–0.61)
EOSINOPHIL NFR BLD AUTO: 2 % (ref 0–6)
ERYTHROCYTE [DISTWIDTH] IN BLOOD BY AUTOMATED COUNT: 16.5 % (ref 11.6–15.1)
FINE GRAN CASTS URNS QL MICRO: ABNORMAL /LPF
FLUAV RNA RESP QL NAA+PROBE: NEGATIVE
FLUBV RNA RESP QL NAA+PROBE: NEGATIVE
GFR SERPL CREATININE-BSD FRML MDRD: 57 ML/MIN/1.73SQ M
GFR SERPL CREATININE-BSD FRML MDRD: 71 ML/MIN/1.73SQ M
GLUCOSE SERPL-MCNC: 102 MG/DL (ref 65–140)
GLUCOSE SERPL-MCNC: 78 MG/DL (ref 65–140)
GLUCOSE UR STRIP-MCNC: NEGATIVE MG/DL
HCT VFR BLD AUTO: 36.7 % (ref 34.8–46.1)
HGB BLD-MCNC: 11.4 G/DL (ref 11.5–15.4)
HGB UR QL STRIP.AUTO: ABNORMAL
IMM GRANULOCYTES # BLD AUTO: 0.07 THOUSAND/UL (ref 0–0.2)
IMM GRANULOCYTES NFR BLD AUTO: 1 % (ref 0–2)
KETONES UR STRIP-MCNC: NEGATIVE MG/DL
LACTATE SERPL-SCNC: 0.8 MMOL/L (ref 0.5–2)
LEUKOCYTE ESTERASE UR QL STRIP: NEGATIVE
LIPASE SERPL-CCNC: 1099 U/L (ref 73–393)
LYMPHOCYTES # BLD AUTO: 2.8 THOUSANDS/ÂΜL (ref 0.6–4.47)
LYMPHOCYTES NFR BLD AUTO: 21 % (ref 14–44)
MCH RBC QN AUTO: 28.1 PG (ref 26.8–34.3)
MCHC RBC AUTO-ENTMCNC: 31.1 G/DL (ref 31.4–37.4)
MCV RBC AUTO: 90 FL (ref 82–98)
MONOCYTES # BLD AUTO: 0.82 THOUSAND/ÂΜL (ref 0.17–1.22)
MONOCYTES NFR BLD AUTO: 6 % (ref 4–12)
NEUTROPHILS # BLD AUTO: 9.36 THOUSANDS/ÂΜL (ref 1.85–7.62)
NEUTS SEG NFR BLD AUTO: 69 % (ref 43–75)
NITRITE UR QL STRIP: NEGATIVE
NON-SQ EPI CELLS URNS QL MICRO: ABNORMAL /HPF
NRBC BLD AUTO-RTO: 0 /100 WBCS
PH UR STRIP.AUTO: 6 [PH]
PLATELET # BLD AUTO: 430 THOUSANDS/UL (ref 149–390)
PMV BLD AUTO: 9.2 FL (ref 8.9–12.7)
POTASSIUM SERPL-SCNC: 3.7 MMOL/L (ref 3.5–5.3)
POTASSIUM SERPL-SCNC: 3.8 MMOL/L (ref 3.5–5.3)
PROT SERPL-MCNC: 6.8 G/DL (ref 6.4–8.4)
PROT UR STRIP-MCNC: ABNORMAL MG/DL
RBC # BLD AUTO: 4.06 MILLION/UL (ref 3.81–5.12)
RBC #/AREA URNS AUTO: ABNORMAL /HPF
RSV RNA RESP QL NAA+PROBE: NEGATIVE
SARS-COV-2 RNA RESP QL NAA+PROBE: NEGATIVE
SODIUM SERPL-SCNC: 138 MMOL/L (ref 135–147)
SODIUM SERPL-SCNC: 139 MMOL/L (ref 135–147)
SP GR UR STRIP.AUTO: >=1.03 (ref 1–1.03)
UROBILINOGEN UR QL STRIP.AUTO: 0.2 E.U./DL
WBC # BLD AUTO: 13.33 THOUSAND/UL (ref 4.31–10.16)
WBC #/AREA URNS AUTO: ABNORMAL /HPF

## 2023-01-02 RX ORDER — HYDROMORPHONE HCL/PF 1 MG/ML
1 SYRINGE (ML) INJECTION EVERY 4 HOURS PRN
Status: DISCONTINUED | OUTPATIENT
Start: 2023-01-02 | End: 2023-01-03

## 2023-01-02 RX ORDER — MORPHINE SULFATE 4 MG/ML
4 INJECTION, SOLUTION INTRAMUSCULAR; INTRAVENOUS ONCE
Status: COMPLETED | OUTPATIENT
Start: 2023-01-02 | End: 2023-01-02

## 2023-01-02 RX ORDER — HEPARIN SODIUM 5000 [USP'U]/ML
5000 INJECTION, SOLUTION INTRAVENOUS; SUBCUTANEOUS EVERY 8 HOURS SCHEDULED
Status: DISCONTINUED | OUTPATIENT
Start: 2023-01-02 | End: 2023-01-03 | Stop reason: HOSPADM

## 2023-01-02 RX ORDER — TAMSULOSIN HYDROCHLORIDE 0.4 MG/1
0.4 CAPSULE ORAL DAILY
Status: DISCONTINUED | OUTPATIENT
Start: 2023-01-02 | End: 2023-01-03 | Stop reason: HOSPADM

## 2023-01-02 RX ORDER — SODIUM CHLORIDE, SODIUM GLUCONATE, SODIUM ACETATE, POTASSIUM CHLORIDE, MAGNESIUM CHLORIDE, SODIUM PHOSPHATE, DIBASIC, AND POTASSIUM PHOSPHATE .53; .5; .37; .037; .03; .012; .00082 G/100ML; G/100ML; G/100ML; G/100ML; G/100ML; G/100ML; G/100ML
125 INJECTION, SOLUTION INTRAVENOUS CONTINUOUS
Status: DISCONTINUED | OUTPATIENT
Start: 2023-01-02 | End: 2023-01-03 | Stop reason: HOSPADM

## 2023-01-02 RX ORDER — FERROUS SULFATE 325(65) MG
325 TABLET ORAL EVERY OTHER DAY
Status: DISCONTINUED | OUTPATIENT
Start: 2023-01-03 | End: 2023-01-03 | Stop reason: HOSPADM

## 2023-01-02 RX ORDER — ONDANSETRON 2 MG/ML
4 INJECTION INTRAMUSCULAR; INTRAVENOUS EVERY 6 HOURS PRN
Status: DISCONTINUED | OUTPATIENT
Start: 2023-01-02 | End: 2023-01-03 | Stop reason: HOSPADM

## 2023-01-02 RX ORDER — OXYCODONE HYDROCHLORIDE 5 MG/1
5 TABLET ORAL EVERY 6 HOURS PRN
Status: DISCONTINUED | OUTPATIENT
Start: 2023-01-02 | End: 2023-01-03

## 2023-01-02 RX ORDER — B-COMPLEX WITH VITAMIN C
1 TABLET ORAL DAILY
Status: DISCONTINUED | OUTPATIENT
Start: 2023-01-03 | End: 2023-01-03 | Stop reason: CLARIF

## 2023-01-02 RX ORDER — ESCITALOPRAM OXALATE 10 MG/1
20 TABLET ORAL
Status: DISCONTINUED | OUTPATIENT
Start: 2023-01-02 | End: 2023-01-03 | Stop reason: HOSPADM

## 2023-01-02 RX ORDER — AMITRIPTYLINE HYDROCHLORIDE 10 MG/1
20 TABLET, FILM COATED ORAL
Status: DISCONTINUED | OUTPATIENT
Start: 2023-01-02 | End: 2023-01-03

## 2023-01-02 RX ORDER — ONDANSETRON 2 MG/ML
4 INJECTION INTRAMUSCULAR; INTRAVENOUS ONCE
Status: COMPLETED | OUTPATIENT
Start: 2023-01-02 | End: 2023-01-02

## 2023-01-02 RX ORDER — B-COMPLEX WITH VITAMIN C
TABLET ORAL DAILY
Status: DISCONTINUED | OUTPATIENT
Start: 2023-01-03 | End: 2023-01-02

## 2023-01-02 RX ADMIN — TAMSULOSIN HYDROCHLORIDE 0.4 MG: 0.4 CAPSULE ORAL at 22:25

## 2023-01-02 RX ADMIN — HYDROMORPHONE HYDROCHLORIDE 1 MG: 1 INJECTION, SOLUTION INTRAMUSCULAR; INTRAVENOUS; SUBCUTANEOUS at 22:29

## 2023-01-02 RX ADMIN — OXYCODONE HYDROCHLORIDE 5 MG: 5 TABLET ORAL at 23:31

## 2023-01-02 RX ADMIN — ONDANSETRON 4 MG: 2 INJECTION INTRAMUSCULAR; INTRAVENOUS at 20:25

## 2023-01-02 RX ADMIN — MORPHINE SULFATE 2 MG: 2 INJECTION, SOLUTION INTRAMUSCULAR; INTRAVENOUS at 21:47

## 2023-01-02 RX ADMIN — MORPHINE SULFATE 4 MG: 4 INJECTION INTRAVENOUS at 20:25

## 2023-01-02 RX ADMIN — AMITRIPTYLINE HYDROCHLORIDE 20 MG: 10 TABLET, FILM COATED ORAL at 22:25

## 2023-01-02 RX ADMIN — MORPHINE SULFATE 4 MG: 4 INJECTION INTRAVENOUS at 21:09

## 2023-01-02 RX ADMIN — SODIUM CHLORIDE, SODIUM GLUCONATE, SODIUM ACETATE, POTASSIUM CHLORIDE, MAGNESIUM CHLORIDE, SODIUM PHOSPHATE, DIBASIC, AND POTASSIUM PHOSPHATE 125 ML/HR: .53; .5; .37; .037; .03; .012; .00082 INJECTION, SOLUTION INTRAVENOUS at 22:29

## 2023-01-02 RX ADMIN — ESCITALOPRAM OXALATE 20 MG: 10 TABLET ORAL at 22:25

## 2023-01-02 RX ADMIN — SODIUM CHLORIDE 1000 ML: 0.9 INJECTION, SOLUTION INTRAVENOUS at 20:24

## 2023-01-02 NOTE — LETTER
[facility] [employer] [facility]  [address]      January 19, 2023    MRN: [id number]     Phone: [phone]     Dear Ms  Kristine Galvez recently had a(n)  performed on  at  [address] [place] [address] that was requested by Kofi Dong MD  The study was reviewed by a radiologist, which is a physician who specializes in medical imaging  The radiologist issued a report describing his or her findings  In that report there was a finding that the radiologist felt warranted further discussion with your health care provider and that discussion would be beneficial to you  The results were sent to Kofi Dong MD on    We recommend that you contact Kofi Dong MD at  or set up an appointment to discuss the results of the imaging test  If you have already heard from Kofi Dong MD regarding the results of your study, you can disregard this letter  This letter is not meant to alarm you, but intended to encourage you to follow-up on your results with the provider that sent you for the imaging study  In addition, we have enclosed answers to frequently asked questions by other patients who have also received a letter to review results with their health care provider (see page two)  Thank you for choosing [facility] for your medical imaging needs  FREQUENTLY ASKED QUESTIONS    1  Why am I receiving this letter? [facility] requires us to notify patients who have findings on imaging exams that may require more testing or follow-up with a health professional within the next 3 months  2  How serious is the finding on the imaging test?  This letter is sent to all patients who may need follow-up or more testing within the next 3 months    Receiving this letter does not necessarily mean you have a life-threatening imaging finding or disease  Recommendations in the radiologist’s imaging report are general in nature and it is up to your healthcare provider to say whether those recommendations make sense for your situation  You are strongly encouraged to talk to your health care provider about the results and ask whether additional steps need to be taken  3  Where can I get a copy of the final report for my recent radiology exam?  To get a full copy of the report you can access your records online at [email or web address] or please contact [facility] Medical Records Department at [phone] Monday through Friday between 8 am and 6 pm          4  What do I need to do now? Please contact your health care provider who requested the imaging study to discuss what further actions (if any) are needed  You may have already heard from (your ordering provider) in regard to this test in which case you can disregard this letter  NOTICE IN ACCORDANCE WITH THE [place] STATE “PATIENT TEST RESULT INFORMATION ACT OF 2018”    You are receiving this notice as a result of a determination by your diagnostic imaging service that further discussions of your test results are warranted and would be beneficial to you  The complete results of your test or tests have been or will be sent to the health care practitioner that ordered the test or tests  It is recommended that you contact your health care practitioner to discuss your results as soon as possible

## 2023-01-03 ENCOUNTER — HOSPITAL ENCOUNTER (OUTPATIENT)
Facility: HOSPITAL | Age: 38
Setting detail: OUTPATIENT SURGERY
Discharge: HOME | End: 2023-01-03
Attending: UROLOGY | Admitting: UROLOGY

## 2023-01-03 ENCOUNTER — TELEPHONE (OUTPATIENT)
Dept: UROLOGY | Facility: CLINIC | Age: 38
End: 2023-01-03

## 2023-01-03 ENCOUNTER — APPOINTMENT (OUTPATIENT)
Dept: RADIOLOGY | Facility: HOSPITAL | Age: 38
End: 2023-01-03

## 2023-01-03 ENCOUNTER — APPOINTMENT (OUTPATIENT)
Dept: ULTRASOUND IMAGING | Facility: HOSPITAL | Age: 38
End: 2023-01-03

## 2023-01-03 ENCOUNTER — ANESTHESIA (OUTPATIENT)
Dept: PERIOP | Facility: HOSPITAL | Age: 38
End: 2023-01-03

## 2023-01-03 ENCOUNTER — ANESTHESIA EVENT (OUTPATIENT)
Dept: PERIOP | Facility: HOSPITAL | Age: 38
End: 2023-01-03

## 2023-01-03 VITALS
DIASTOLIC BLOOD PRESSURE: 53 MMHG | SYSTOLIC BLOOD PRESSURE: 109 MMHG | OXYGEN SATURATION: 97 % | RESPIRATION RATE: 16 BRPM | HEART RATE: 86 BPM | TEMPERATURE: 98.4 F

## 2023-01-03 VITALS
TEMPERATURE: 97.5 F | BODY MASS INDEX: 21.71 KG/M2 | HEIGHT: 62 IN | HEART RATE: 72 BPM | SYSTOLIC BLOOD PRESSURE: 111 MMHG | WEIGHT: 118 LBS | OXYGEN SATURATION: 95 % | RESPIRATION RATE: 20 BRPM | DIASTOLIC BLOOD PRESSURE: 63 MMHG

## 2023-01-03 DIAGNOSIS — R74.01 TRANSAMINITIS: ICD-10-CM

## 2023-01-03 DIAGNOSIS — N20.1 LEFT URETERAL STONE: ICD-10-CM

## 2023-01-03 DIAGNOSIS — N20.1 LEFT URETERAL CALCULUS: Primary | ICD-10-CM

## 2023-01-03 DIAGNOSIS — N13.30 HYDRONEPHROSIS OF LEFT KIDNEY: Primary | ICD-10-CM

## 2023-01-03 PROBLEM — D72.829 LEUKOCYTOSIS: Status: ACTIVE | Noted: 2023-01-03

## 2023-01-03 LAB
ALBUMIN SERPL BCP-MCNC: 2.4 G/DL (ref 3.5–5)
ALP SERPL-CCNC: 162 U/L (ref 46–116)
ALT SERPL W P-5'-P-CCNC: 284 U/L (ref 12–78)
AMYLASE SERPL-CCNC: 63 IU/L (ref 25–115)
ANION GAP SERPL CALCULATED.3IONS-SCNC: 11 MMOL/L (ref 4–13)
ANION GAP SERPL CALCULATED.3IONS-SCNC: 5 MMOL/L (ref 4–13)
APAP SERPL-MCNC: <2 UG/ML (ref 10–20)
AST SERPL W P-5'-P-CCNC: 943 U/L (ref 5–45)
BASOPHILS # BLD AUTO: 0.03 THOUSANDS/ÂΜL (ref 0–0.1)
BASOPHILS NFR BLD AUTO: 0 % (ref 0–1)
BILIRUB SERPL-MCNC: 0.39 MG/DL (ref 0.2–1)
BUN SERPL-MCNC: 12 MG/DL (ref 5–25)
BUN SERPL-MCNC: 13 MG/DL (ref 5–25)
CALCIUM ALBUM COR SERPL-MCNC: 9 MG/DL (ref 8.3–10.1)
CALCIUM SERPL-MCNC: 7.7 MG/DL (ref 8.3–10.1)
CALCIUM SERPL-MCNC: 7.8 MG/DL (ref 8.3–10.1)
CHLORIDE SERPL-SCNC: 105 MMOL/L (ref 96–108)
CHLORIDE SERPL-SCNC: 113 MMOL/L (ref 96–108)
CO2 SERPL-SCNC: 15 MMOL/L (ref 21–32)
CO2 SERPL-SCNC: 19 MMOL/L (ref 21–32)
CREAT SERPL-MCNC: 1.36 MG/DL (ref 0.6–1.3)
CREAT SERPL-MCNC: 1.4 MG/DL (ref 0.6–1.3)
EOSINOPHIL # BLD AUTO: 0.04 THOUSAND/ÂΜL (ref 0–0.61)
EOSINOPHIL NFR BLD AUTO: 1 % (ref 0–6)
ERYTHROCYTE [DISTWIDTH] IN BLOOD BY AUTOMATED COUNT: 16.5 % (ref 11.6–15.1)
GFR SERPL CREATININE-BSD FRML MDRD: 47 ML/MIN/1.73SQ M
GFR SERPL CREATININE-BSD FRML MDRD: 49 ML/MIN/1.73SQ M
GGT SERPL-CCNC: 162 U/L (ref 5–85)
GLUCOSE P FAST SERPL-MCNC: 85 MG/DL (ref 65–99)
GLUCOSE P FAST SERPL-MCNC: 96 MG/DL (ref 65–99)
GLUCOSE SERPL-MCNC: 85 MG/DL (ref 65–140)
GLUCOSE SERPL-MCNC: 96 MG/DL (ref 65–140)
HAV IGM SER QL: NORMAL
HBV CORE IGM SER QL: NORMAL
HBV SURFACE AG SER QL: NORMAL
HCT VFR BLD AUTO: 34.4 % (ref 34.8–46.1)
HCV AB SER QL: NORMAL
HGB BLD-MCNC: 10.8 G/DL (ref 11.5–15.4)
IMM GRANULOCYTES # BLD AUTO: 0.03 THOUSAND/UL (ref 0–0.2)
IMM GRANULOCYTES NFR BLD AUTO: 0 % (ref 0–2)
LIPASE SERPL-CCNC: 209 U/L (ref 73–393)
LYMPHOCYTES # BLD AUTO: 1.23 THOUSANDS/ÂΜL (ref 0.6–4.47)
LYMPHOCYTES NFR BLD AUTO: 17 % (ref 14–44)
MCH RBC QN AUTO: 28.3 PG (ref 26.8–34.3)
MCHC RBC AUTO-ENTMCNC: 31.4 G/DL (ref 31.4–37.4)
MCV RBC AUTO: 90 FL (ref 82–98)
MONOCYTES # BLD AUTO: 0.63 THOUSAND/ÂΜL (ref 0.17–1.22)
MONOCYTES NFR BLD AUTO: 8 % (ref 4–12)
NEUTROPHILS # BLD AUTO: 5.5 THOUSANDS/ÂΜL (ref 1.85–7.62)
NEUTS SEG NFR BLD AUTO: 74 % (ref 43–75)
NRBC BLD AUTO-RTO: 0 /100 WBCS
PLATELET # BLD AUTO: 340 THOUSANDS/UL (ref 149–390)
PMV BLD AUTO: 9.3 FL (ref 8.9–12.7)
POTASSIUM SERPL-SCNC: 3.8 MMOL/L (ref 3.5–5.3)
POTASSIUM SERPL-SCNC: 5.5 MMOL/L (ref 3.5–5.3)
PROT SERPL-MCNC: 5.7 G/DL (ref 6.4–8.4)
RBC # BLD AUTO: 3.82 MILLION/UL (ref 3.81–5.12)
SODIUM SERPL-SCNC: 133 MMOL/L (ref 135–147)
SODIUM SERPL-SCNC: 135 MMOL/L (ref 135–147)
WBC # BLD AUTO: 7.46 THOUSAND/UL (ref 4.31–10.16)

## 2023-01-03 DEVICE — INLAY OPTIMA URETERAL STENT W/O GUIDEWIRE
Type: IMPLANTABLE DEVICE | Site: URETER | Status: FUNCTIONAL
Brand: BARD® INLAY OPTIMA® URETERAL STENT

## 2023-01-03 RX ORDER — HYDROMORPHONE HCL/PF 1 MG/ML
1 SYRINGE (ML) INJECTION EVERY 4 HOURS PRN
Status: DISCONTINUED | OUTPATIENT
Start: 2023-01-03 | End: 2023-01-03 | Stop reason: HOSPADM

## 2023-01-03 RX ORDER — HYDROMORPHONE HCL IN WATER/PF 6 MG/30 ML
0.2 PATIENT CONTROLLED ANALGESIA SYRINGE INTRAVENOUS
Status: DISCONTINUED | OUTPATIENT
Start: 2023-01-03 | End: 2023-01-03 | Stop reason: HOSPADM

## 2023-01-03 RX ORDER — DIPHENHYDRAMINE HCL 25 MG
12.5 TABLET ORAL EVERY 6 HOURS PRN
Status: DISCONTINUED | OUTPATIENT
Start: 2023-01-03 | End: 2023-01-03 | Stop reason: HOSPADM

## 2023-01-03 RX ORDER — OXYCODONE HYDROCHLORIDE 5 MG/1
10 TABLET ORAL EVERY 6 HOURS PRN
Status: DISCONTINUED | OUTPATIENT
Start: 2023-01-03 | End: 2023-01-03 | Stop reason: HOSPADM

## 2023-01-03 RX ORDER — OXYCODONE HYDROCHLORIDE 5 MG/1
5 TABLET ORAL EVERY 4 HOURS PRN
Status: DISCONTINUED | OUTPATIENT
Start: 2023-01-03 | End: 2023-01-03 | Stop reason: HOSPADM

## 2023-01-03 RX ORDER — OXYCODONE HYDROCHLORIDE 5 MG/1
5 TABLET ORAL EVERY 4 HOURS PRN
Status: CANCELLED | OUTPATIENT
Start: 2023-01-03

## 2023-01-03 RX ORDER — OXYCODONE HYDROCHLORIDE 10 MG/1
10 TABLET ORAL EVERY 6 HOURS PRN
Status: CANCELLED | OUTPATIENT
Start: 2023-01-03

## 2023-01-03 RX ORDER — ESCITALOPRAM OXALATE 10 MG/1
20 TABLET ORAL
Status: CANCELLED | OUTPATIENT
Start: 2023-01-03

## 2023-01-03 RX ORDER — HEPARIN SODIUM 5000 [USP'U]/ML
5000 INJECTION, SOLUTION INTRAVENOUS; SUBCUTANEOUS EVERY 8 HOURS SCHEDULED
Status: DISCONTINUED | OUTPATIENT
Start: 2023-01-03 | End: 2023-01-03 | Stop reason: HOSPADM

## 2023-01-03 RX ORDER — SODIUM CHLORIDE, SODIUM GLUCONATE, SODIUM ACETATE, POTASSIUM CHLORIDE, MAGNESIUM CHLORIDE, SODIUM PHOSPHATE, DIBASIC, AND POTASSIUM PHOSPHATE .53; .5; .37; .037; .03; .012; .00082 G/100ML; G/100ML; G/100ML; G/100ML; G/100ML; G/100ML; G/100ML
125 INJECTION, SOLUTION INTRAVENOUS CONTINUOUS
Status: DISCONTINUED | OUTPATIENT
Start: 2023-01-03 | End: 2023-01-03 | Stop reason: HOSPADM

## 2023-01-03 RX ORDER — ESCITALOPRAM OXALATE 20 MG/1
20 TABLET ORAL
Status: DISCONTINUED | OUTPATIENT
Start: 2023-01-03 | End: 2023-01-03 | Stop reason: HOSPADM

## 2023-01-03 RX ORDER — MAGNESIUM HYDROXIDE 1200 MG/15ML
LIQUID ORAL AS NEEDED
Status: DISCONTINUED | OUTPATIENT
Start: 2023-01-03 | End: 2023-01-03 | Stop reason: HOSPADM

## 2023-01-03 RX ORDER — SENNOSIDES 8.6 MG
8.6 TABLET ORAL
Qty: 5 TABLET | Refills: 0 | Status: SHIPPED | OUTPATIENT
Start: 2023-01-03 | End: 2023-01-08

## 2023-01-03 RX ORDER — SODIUM CHLORIDE, SODIUM GLUCONATE, SODIUM ACETATE, POTASSIUM CHLORIDE, MAGNESIUM CHLORIDE, SODIUM PHOSPHATE, DIBASIC, AND POTASSIUM PHOSPHATE .53; .5; .37; .037; .03; .012; .00082 G/100ML; G/100ML; G/100ML; G/100ML; G/100ML; G/100ML; G/100ML
125 INJECTION, SOLUTION INTRAVENOUS CONTINUOUS
Status: CANCELLED | OUTPATIENT
Start: 2023-01-03

## 2023-01-03 RX ORDER — ONDANSETRON 2 MG/ML
INJECTION INTRAMUSCULAR; INTRAVENOUS AS NEEDED
Status: DISCONTINUED | OUTPATIENT
Start: 2023-01-03 | End: 2023-01-03

## 2023-01-03 RX ORDER — PROPOFOL 10 MG/ML
INJECTION, EMULSION INTRAVENOUS AS NEEDED
Status: DISCONTINUED | OUTPATIENT
Start: 2023-01-03 | End: 2023-01-03

## 2023-01-03 RX ORDER — TAMSULOSIN HYDROCHLORIDE 0.4 MG/1
0.4 CAPSULE ORAL DAILY
Status: DISCONTINUED | OUTPATIENT
Start: 2023-01-04 | End: 2023-01-03 | Stop reason: HOSPADM

## 2023-01-03 RX ORDER — MIDAZOLAM HYDROCHLORIDE 2 MG/2ML
INJECTION, SOLUTION INTRAMUSCULAR; INTRAVENOUS AS NEEDED
Status: DISCONTINUED | OUTPATIENT
Start: 2023-01-03 | End: 2023-01-03

## 2023-01-03 RX ORDER — CEPHALEXIN 500 MG/1
500 CAPSULE ORAL EVERY 6 HOURS SCHEDULED
Qty: 12 CAPSULE | Refills: 0 | Status: SHIPPED | OUTPATIENT
Start: 2023-01-03 | End: 2023-01-06

## 2023-01-03 RX ORDER — OXYCODONE HYDROCHLORIDE 10 MG/1
10 TABLET ORAL EVERY 6 HOURS PRN
Status: DISCONTINUED | OUTPATIENT
Start: 2023-01-03 | End: 2023-01-03 | Stop reason: HOSPADM

## 2023-01-03 RX ORDER — CEFAZOLIN SODIUM 1 G/50ML
1000 SOLUTION INTRAVENOUS ONCE
Status: DISCONTINUED | OUTPATIENT
Start: 2023-01-03 | End: 2023-01-03 | Stop reason: HOSPADM

## 2023-01-03 RX ORDER — FENTANYL CITRATE/PF 50 MCG/ML
25 SYRINGE (ML) INJECTION
Status: DISCONTINUED | OUTPATIENT
Start: 2023-01-03 | End: 2023-01-03 | Stop reason: HOSPADM

## 2023-01-03 RX ORDER — ONDANSETRON 2 MG/ML
4 INJECTION INTRAMUSCULAR; INTRAVENOUS EVERY 6 HOURS PRN
Status: DISCONTINUED | OUTPATIENT
Start: 2023-01-03 | End: 2023-01-03 | Stop reason: HOSPADM

## 2023-01-03 RX ORDER — ACETAMINOPHEN 325 MG/1
650 TABLET ORAL EVERY 6 HOURS PRN
Status: DISCONTINUED | OUTPATIENT
Start: 2023-01-03 | End: 2023-01-03 | Stop reason: HOSPADM

## 2023-01-03 RX ORDER — SODIUM CHLORIDE, SODIUM LACTATE, POTASSIUM CHLORIDE, CALCIUM CHLORIDE 600; 310; 30; 20 MG/100ML; MG/100ML; MG/100ML; MG/100ML
125 INJECTION, SOLUTION INTRAVENOUS CONTINUOUS
Status: DISCONTINUED | OUTPATIENT
Start: 2023-01-03 | End: 2023-01-03 | Stop reason: HOSPADM

## 2023-01-03 RX ORDER — POTASSIUM CHLORIDE 14.9 MG/ML
20 INJECTION INTRAVENOUS
Status: COMPLETED | OUTPATIENT
Start: 2023-01-03 | End: 2023-01-03

## 2023-01-03 RX ORDER — TAMSULOSIN HYDROCHLORIDE 0.4 MG/1
0.4 CAPSULE ORAL ONCE
Status: COMPLETED | OUTPATIENT
Start: 2023-01-03 | End: 2023-01-03

## 2023-01-03 RX ORDER — TAMSULOSIN HYDROCHLORIDE 0.4 MG/1
0.4 CAPSULE ORAL DAILY
Status: CANCELLED | OUTPATIENT
Start: 2023-01-04

## 2023-01-03 RX ORDER — FERROUS SULFATE 325(65) MG
325 TABLET ORAL EVERY OTHER DAY
Status: CANCELLED | OUTPATIENT
Start: 2023-01-05

## 2023-01-03 RX ORDER — OXYCODONE HYDROCHLORIDE 5 MG/1
5 TABLET ORAL EVERY 4 HOURS PRN
Qty: 8 TABLET | Refills: 0 | Status: SHIPPED | OUTPATIENT
Start: 2023-01-03 | End: 2023-01-08

## 2023-01-03 RX ORDER — FERROUS SULFATE 325(65) MG
325 TABLET ORAL EVERY OTHER DAY
Status: DISCONTINUED | OUTPATIENT
Start: 2023-01-05 | End: 2023-01-03 | Stop reason: HOSPADM

## 2023-01-03 RX ORDER — CEFAZOLIN SODIUM 1 G/50ML
SOLUTION INTRAVENOUS AS NEEDED
Status: DISCONTINUED | OUTPATIENT
Start: 2023-01-03 | End: 2023-01-03

## 2023-01-03 RX ORDER — ONDANSETRON 2 MG/ML
4 INJECTION INTRAMUSCULAR; INTRAVENOUS EVERY 6 HOURS PRN
Status: CANCELLED | OUTPATIENT
Start: 2023-01-03

## 2023-01-03 RX ORDER — HYDROMORPHONE HCL/PF 1 MG/ML
1 SYRINGE (ML) INJECTION EVERY 4 HOURS PRN
Status: CANCELLED | OUTPATIENT
Start: 2023-01-03

## 2023-01-03 RX ORDER — HYDROMORPHONE HCL/PF 1 MG/ML
0.5 SYRINGE (ML) INJECTION
Status: DISCONTINUED | OUTPATIENT
Start: 2023-01-03 | End: 2023-01-03 | Stop reason: HOSPADM

## 2023-01-03 RX ORDER — LIDOCAINE HYDROCHLORIDE 20 MG/ML
INJECTION, SOLUTION EPIDURAL; INFILTRATION; INTRACAUDAL; PERINEURAL AS NEEDED
Status: DISCONTINUED | OUTPATIENT
Start: 2023-01-03 | End: 2023-01-03

## 2023-01-03 RX ORDER — FENTANYL CITRATE 50 UG/ML
INJECTION, SOLUTION INTRAMUSCULAR; INTRAVENOUS AS NEEDED
Status: DISCONTINUED | OUTPATIENT
Start: 2023-01-03 | End: 2023-01-03

## 2023-01-03 RX ORDER — SODIUM CHLORIDE, SODIUM LACTATE, POTASSIUM CHLORIDE, CALCIUM CHLORIDE 600; 310; 30; 20 MG/100ML; MG/100ML; MG/100ML; MG/100ML
INJECTION, SOLUTION INTRAVENOUS CONTINUOUS PRN
Status: DISCONTINUED | OUTPATIENT
Start: 2023-01-03 | End: 2023-01-03

## 2023-01-03 RX ORDER — HEPARIN SODIUM 5000 [USP'U]/ML
5000 INJECTION, SOLUTION INTRAVENOUS; SUBCUTANEOUS EVERY 8 HOURS SCHEDULED
Status: CANCELLED | OUTPATIENT
Start: 2023-01-03

## 2023-01-03 RX ADMIN — HYDROMORPHONE HYDROCHLORIDE 1 MG: 1 INJECTION, SOLUTION INTRAMUSCULAR; INTRAVENOUS; SUBCUTANEOUS at 14:16

## 2023-01-03 RX ADMIN — HYDROMORPHONE HYDROCHLORIDE 1 MG: 1 INJECTION, SOLUTION INTRAMUSCULAR; INTRAVENOUS; SUBCUTANEOUS at 07:17

## 2023-01-03 RX ADMIN — ONDANSETRON 4 MG: 2 INJECTION INTRAMUSCULAR; INTRAVENOUS at 16:08

## 2023-01-03 RX ADMIN — OXYCODONE HYDROCHLORIDE 5 MG: 5 TABLET ORAL at 05:25

## 2023-01-03 RX ADMIN — HYDROMORPHONE HYDROCHLORIDE 1 MG: 1 INJECTION, SOLUTION INTRAMUSCULAR; INTRAVENOUS; SUBCUTANEOUS at 02:21

## 2023-01-03 RX ADMIN — HYDROMORPHONE HYDROCHLORIDE 1 MG: 1 INJECTION, SOLUTION INTRAMUSCULAR; INTRAVENOUS; SUBCUTANEOUS at 11:46

## 2023-01-03 RX ADMIN — MIDAZOLAM 2 MG: 1 INJECTION INTRAMUSCULAR; INTRAVENOUS at 15:54

## 2023-01-03 RX ADMIN — FERROUS SULFATE TAB 325 MG (65 MG ELEMENTAL FE) 325 MG: 325 (65 FE) TAB at 08:45

## 2023-01-03 RX ADMIN — TAMSULOSIN HYDROCHLORIDE 0.4 MG: 0.4 CAPSULE ORAL at 13:15

## 2023-01-03 RX ADMIN — FENTANYL CITRATE 25 MCG: 50 INJECTION INTRAMUSCULAR; INTRAVENOUS at 16:20

## 2023-01-03 RX ADMIN — POTASSIUM CHLORIDE 20 MEQ: 14.9 INJECTION, SOLUTION INTRAVENOUS at 10:31

## 2023-01-03 RX ADMIN — SODIUM CHLORIDE, SODIUM LACTATE, POTASSIUM CHLORIDE, AND CALCIUM CHLORIDE: .6; .31; .03; .02 INJECTION, SOLUTION INTRAVENOUS at 15:58

## 2023-01-03 RX ADMIN — FENTANYL CITRATE 25 MCG: 50 INJECTION INTRAMUSCULAR; INTRAVENOUS at 16:31

## 2023-01-03 RX ADMIN — FENTANYL CITRATE 25 MCG: 50 INJECTION INTRAMUSCULAR; INTRAVENOUS at 16:19

## 2023-01-03 RX ADMIN — CEFAZOLIN SODIUM 1000 MG: 1 SOLUTION INTRAVENOUS at 16:16

## 2023-01-03 RX ADMIN — PROPOFOL 150 MG: 10 INJECTION, EMULSION INTRAVENOUS at 16:12

## 2023-01-03 RX ADMIN — LIDOCAINE HYDROCHLORIDE 50 MG: 20 INJECTION, SOLUTION EPIDURAL; INFILTRATION; INTRACAUDAL; PERINEURAL at 16:12

## 2023-01-03 RX ADMIN — OXYCODONE HYDROCHLORIDE 5 MG: 5 TABLET ORAL at 18:23

## 2023-01-03 RX ADMIN — OXYCODONE HYDROCHLORIDE 5 MG: 5 TABLET ORAL at 09:23

## 2023-01-03 RX ADMIN — POTASSIUM CHLORIDE 20 MEQ: 14.9 INJECTION, SOLUTION INTRAVENOUS at 12:21

## 2023-01-03 RX ADMIN — OXYCODONE HYDROCHLORIDE 5 MG: 5 TABLET ORAL at 13:15

## 2023-01-03 RX ADMIN — FENTANYL CITRATE 25 MCG: 50 INJECTION INTRAMUSCULAR; INTRAVENOUS at 16:08

## 2023-01-03 NOTE — ASSESSMENT & PLAN NOTE
· Secondary to history of gastric bypass  Patient reports she gets outpatient potassium infusions through her port    · K 3 8 on admission  · Continue to monitor electrolytes and replace PRN

## 2023-01-03 NOTE — ED PROVIDER NOTES
History  Chief Complaint   Patient presents with   • Flank Pain     Pt reports being dx with kidney stones by nephrologist approx 3 weeks ago and told to try to pass them, however pt is having increased left sided flank pain since 1500 today  History of kidney stones  Seen here 3 weeks ago with a passed left ureteral stone  Seen here 10 days ago with a right ureteral stone  Now complains of left flank pain going to left side of abdomen starting approximately 5 hours prior to arrival   Over-the-counter medicines without relief  Has nausea but no vomiting  No dysuria or frequency  No fevers or chills  Feels like past known pain  History provided by:  Patient   used: No    Flank Pain  Pain location:  L flank  Pain quality: aching    Pain radiation: Left side of abdomen  Pain severity:  Mild  Onset quality:  Sudden  Duration:  5 hours  Timing:  Constant  Progression:  Waxing and waning  Chronicity:  Recurrent  Context: not alcohol use, not diet changes, not recent travel and not sick contacts    Relieved by:  Nothing  Worsened by:  Nothing  Ineffective treatments:  None tried  Associated symptoms: nausea    Associated symptoms: no anorexia, no chest pain, no chills, no constipation, no cough, no diarrhea, no dysuria, no fever, no flatus, no hematuria, no shortness of breath, no sore throat and no vomiting        Prior to Admission Medications   Prescriptions Last Dose Informant Patient Reported? Taking? AMILoride 5 mg tablet 1/2/2023  No Yes   Sig: Take 1 tablet (5 mg total) by mouth daily   B Complex Vitamins (VITAMIN B COMPLEX PO) 1/2/2023  Yes Yes   Sig: Take 1 capsule by mouth daily     Catheters MISC   No No   Sig: by Does not apply route 2 (two) times a week Port care per [facility], Disp, (SYRINGE 3CC/25GX5/8") 25G X 5/8" 3 ML MISC   No No   Sig: Use once monthly for B12 injection     amitriptyline (ELAVIL) 10 mg tablet 1/2/2023  No Yes   Sig: Take 2 tablets (20 mg total) by mouth daily at bedtime   calcium carbonate (OYSTER SHELL,OSCAL) 500 mg   No No   Sig: Take 1 tablet by mouth 3 (three) times a day with meals   cyanocobalamin 1,000 mcg/mL 2023  No Yes   Sig: Inject 1 mL (1,000 mcg total) into a muscle every 30 (thirty) days   ergocalciferol (ERGOCALCIFEROL) 1 25 MG (45557 UT) capsule 2023  No Yes   Sig: Take 1 capsule (50,000 Units total) by mouth once a week  and    escitalopram (LEXAPRO) 10 mg tablet 2023  Yes Yes   Sig: Take 20 mg by mouth daily at bedtime    ferrous sulfate 325 (65 Fe) mg tablet 2023  Yes Yes   Sig: Take 325 mg by mouth every other day Last took 3/9/20    ondansetron (ZOFRAN) 4 mg tablet 2023  No Yes   Sig: Take 1 tablet (4 mg total) by mouth every 8 (eight) hours as needed for nausea or vomiting   oxyCODONE (ROXICODONE) 5 immediate release tablet Past Week  No Yes   Sig: Take 1 tablet (5 mg total) by mouth every 6 (six) hours as needed for moderate pain for up to 6 doses Max Daily Amount: 20 mg   potassium chloride 0 4 mEq/mL 2023  No Yes   Sig: Inject 200 mL (80 mEq total) into a catheter in a vein daily 80 meq in 1 litre bag to be infused daily over 8 hours      Continue as ordered prior to admission   tamsulosin (FLOMAX) 0 4 mg 2023  No Yes   Sig: Take 1 capsule (0 4 mg total) by mouth in the morning      Facility-Administered Medications: None       Past Medical History:   Diagnosis Date   • C  difficile colitis    • COVID-19     2022- pt denies long covid   • DUB (dysfunctional uterine bleeding)    • H  pylori infection    • Hypertension    • Hypokalemia    • Left lower quadrant abdominal pain 2020   • Migraine    • Psychiatric disorder     Anxiety   • Rectal bleeding    • Renal disorder    • Rhabdomyolysis    • Thrombosed external hemorrhoid        Past Surgical History:   Procedure Laterality Date   • ABDOMINAL SURGERY     • APPENDECTOMY     •  SECTION     • CHOLECYSTECTOMY     • COSMETIC SURGERY      "tummy tuck"   • FL GUIDED CENTRAL VENOUS ACCESS DEVICE INSERTION  12/21/2018   • GASTRIC BYPASS     • HYSTERECTOMY     • LAPAROSCOPY     • ME ANRCT XM SURG REQ ANES GENERAL SPI/EDRL DX N/A 9/23/2021    Procedure: ANAL EXAM UNDER ANESTHESIA; HEMORRHOIDECTOMY;  Surgeon: Manav Petty DO;  Location:  MAIN OR;  Service: General   • ME EXC THROMBOSED HEMORRHOID Alanis Cheese N/A 7/8/2022    Procedure: EXCISION OF THROMBOSED EXTERNAL HEMORRHOID, Excision of perianal skin tag, dranage of perianal abscess, anal fistulatomy;  Surgeon: Manav Petty DO;  Location: [facility];  Service: General   • TUNNELED VENOUS PORT PLACEMENT N/A 12/21/2018    Procedure: INSERTION VENOUS PORT (PORT-A-CATH); Surgeon: Zakiya King DO;  Location: [facility];  Service: General       Family History   Problem Relation Age of Onset   • No Known Problems Mother    • Hypertension Father    • Diabetes Father    • Diabetes Maternal Grandfather      I have reviewed and agree with the history as documented  E-Cigarette/Vaping   • E-Cigarette Use Never User      E-Cigarette/Vaping Substances     Social History     Tobacco Use   • Smoking status: Never   • Smokeless tobacco: Never   Vaping Use   • Vaping Use: Never used   Substance Use Topics   • Alcohol use: Never     Alcohol/week: 0 0 standard drinks     Comment: 0   • Drug use: Never       Review of Systems   Constitutional: Negative for chills and fever  HENT: Negative for ear pain, hearing loss, sore throat, trouble swallowing and voice change  Eyes: Negative for pain and discharge  Respiratory: Negative for cough, shortness of breath and wheezing  Cardiovascular: Negative for chest pain and palpitations  Gastrointestinal: Positive for nausea  Negative for abdominal pain, anorexia, blood in stool, constipation, diarrhea, flatus and vomiting  Genitourinary: Positive for flank pain   Negative for dysuria, frequency and hematuria  Musculoskeletal: Negative for joint swelling, neck pain and neck stiffness  Skin: Negative for rash and wound  Neurological: Negative for dizziness, seizures, syncope, facial asymmetry and headaches  Psychiatric/Behavioral: Negative for hallucinations, self-injury and suicidal ideas  All other systems reviewed and are negative  Physical Exam  Physical Exam  Vitals and nursing note reviewed  Constitutional:       General: She is not in acute distress  Appearance: She is well-developed  HENT:      Head: Normocephalic and atraumatic  Right Ear: External ear normal       Left Ear: External ear normal    Eyes:      General: No scleral icterus  Right eye: No discharge  Left eye: No discharge  Extraocular Movements: Extraocular movements intact  Conjunctiva/sclera: Conjunctivae normal    Cardiovascular:      Rate and Rhythm: Normal rate and regular rhythm  Heart sounds: Normal heart sounds  No murmur heard  Pulmonary:      Effort: Pulmonary effort is normal       Breath sounds: Normal breath sounds  No wheezing or rales  Abdominal:      General: Bowel sounds are normal  There is no distension  Palpations: Abdomen is soft  Tenderness: There is no abdominal tenderness  There is no guarding or rebound  Musculoskeletal:         General: No deformity  Normal range of motion  Cervical back: Normal range of motion and neck supple  Skin:     General: Skin is warm and dry  Findings: No rash  Neurological:      General: No focal deficit present  Mental Status: She is alert and oriented to person, place, and time  Cranial Nerves: No cranial nerve deficit  Psychiatric:         Mood and Affect: Mood normal          Behavior: Behavior normal          Thought Content:  Thought content normal          Judgment: Judgment normal          Vital Signs  ED Triage Vitals [01/02/23 2006]   Temperature Pulse Respirations Blood Pressure SpO2   97 7 °F (36 5 °C) 86 20 141/67 99 %      Temp Source Heart Rate Source Patient Position - Orthostatic VS BP Location FiO2 (%)   Temporal Monitor Sitting Left arm --      Pain Score       10 - Worst Possible Pain           Vitals:    01/02/23 2006   BP: 141/67   Pulse: 86   Patient Position - Orthostatic VS: Sitting         Visual Acuity      ED Medications  Medications   sodium chloride 0 9 % bolus 1,000 mL (1,000 mL Intravenous New Bag 1/2/23 2024)   morphine injection 4 mg (4 mg Intravenous Given 1/2/23 2025)   ondansetron (ZOFRAN) injection 4 mg (4 mg Intravenous Given 1/2/23 2025)   morphine injection 4 mg (4 mg Intravenous Given 1/2/23 2109)       Diagnostic Studies  Results Reviewed     Procedure Component Value Units Date/Time    Urine Microscopic [[id number]]  (Abnormal) Collected: 01/02/23 2103    Lab Status: Final result Specimen: Urine, Clean Catch Updated: 01/02/23 2118     RBC, UA 4-10 /hpf      WBC, UA 0-1 /hpf      Epithelial Cells Occasional /hpf      Bacteria, UA None Seen /hpf      Fine granular casts 0-1 /lpf      Ca Oxalate Didi, UA Moderate /hpf     UA w Reflex to Microscopic w Reflex to Culture [[id number]]  (Abnormal) Collected: 01/02/23 2103    Lab Status: Final result Specimen: Urine, Clean Catch Updated: 01/02/23 2110     Color, UA Yellow     Clarity, UA Slightly Cloudy     Specific Gravity, UA >=1 030     pH, UA 6 0     Leukocytes, UA Negative     Nitrite, UA Negative     Protein, UA 30 (1+) mg/dl      Glucose, UA Negative mg/dl      Ketones, UA Negative mg/dl      Urobilinogen, UA 0 2 E U /dl      Bilirubin, UA Negative     Occult Blood, UA Moderate    Lactic acid [[id number]]  (Normal) Collected: 01/02/23 2021    Lab Status: Final result Specimen: Blood from Arm, Left Updated: 01/02/23 2048     LACTIC ACID 0 8 mmol/L     Narrative:      Result may be elevated if tourniquet was used during collection      Comprehensive metabolic panel [[id number]]  (Abnormal) Collected: 01/02/23 2021 Lab Status: Final result Specimen: Blood from Arm, Left Updated: 01/02/23 2043     Sodium 139 mmol/L      Potassium 3 8 mmol/L      Chloride 106 mmol/L      CO2 23 mmol/L      ANION GAP 10 mmol/L      BUN 13 mg/dL      Creatinine 1 20 mg/dL      Glucose 102 mg/dL      Calcium 7 8 mg/dL      Corrected Calcium 8 6 mg/dL      AST 43 U/L      ALT 33 U/L      Alkaline Phosphatase 75 U/L      Total Protein 6 8 g/dL      Albumin 3 0 g/dL      Total Bilirubin 0 27 mg/dL      eGFR 57 ml/min/1 73sq m     Narrative:      [place] guidelines for Chronic Kidney Disease (CKD):   •  Stage 1 with normal or high GFR (GFR > 90 mL/min/1 73 square meters)  •  Stage 2 Mild CKD (GFR = 60-89 mL/min/1 73 square meters)  •  Stage 3A Moderate CKD (GFR = 45-59 mL/min/1 73 square meters)  •  Stage 3B Moderate CKD (GFR = 30-44 mL/min/1 73 square meters)  •  Stage 4 Severe CKD (GFR = 15-29 mL/min/1 73 square meters)  •  Stage 5 End Stage CKD (GFR <15 mL/min/1 73 square meters)  Note: GFR calculation is accurate only with a steady state creatinine    Lipase [[id number]]  (Abnormal) Collected: 01/02/23 2021    Lab Status: Final result Specimen: Blood from Arm, Left Updated: 01/02/23 2043     Lipase 1,099 u/L     CBC and differential [[id number]]  (Abnormal) Collected: 01/02/23 2021    Lab Status: Final result Specimen: Blood from Arm, Left Updated: 01/02/23 2027     WBC 13 33 Thousand/uL      RBC 4 06 Million/uL      Hemoglobin 11 4 g/dL      Hematocrit 36 7 %      MCV 90 fL      MCH 28 1 pg      MCHC 31 1 g/dL      RDW 16 5 %      MPV 9 2 fL      Platelets 555 Thousands/uL      nRBC 0 /100 WBCs      Neutrophils Relative 69 %      Immat GRANS % 1 %      Lymphocytes Relative 21 %      Monocytes Relative 6 %      Eosinophils Relative 2 %      Basophils Relative 1 %      Neutrophils Absolute 9 36 Thousands/µL      Immature Grans Absolute 0 07 Thousand/uL      Lymphocytes Absolute 2 80 Thousands/µL      Monocytes Absolute 0 82 Thousand/µL      Eosinophils Absolute 0 22 Thousand/µL      Basophils Absolute 0 06 Thousands/µL                  CT renal stone study abdomen pelvis wo contrast   Final Result by Joann Lu MD (01/02 2118)      Mild left hydronephrosis and hydroureter secondary to 2 distal ureteral calculi each measuring approximately 3 mm in diameter with one at the ureterovesicular junction and the 2nd approximately 5 cm proximal to this  Additional tiny bilateral nonobstructing renal calculi  Findings marked for immediate notification in Epic  Workstation performed: [id number]                    Procedures  Procedures         ED Course  ED Course as of 01/02/23 2138   Zechariah Flowers Discussed with urology on-call, Yola Irvin  Pain management  Flomax  Can follow-up as an outpatient  Medical Decision Making  Amount and/or Complexity of Data Reviewed  External Data Reviewed: labs and radiology  Labs: ordered  Decision-making details documented in ED Course  Radiology: ordered  Decision-making details documented in ED Course  Risk  OTC drugs  Prescription drug management  Disposition  Final diagnoses:   Left ureteral stone   Acute pancreatitis without infection or necrosis, unspecified pancreatitis type     Time reflects when diagnosis was documented in both MDM as applicable and the Disposition within this note     Time User Action Codes Description Comment    1/2/2023  9:33 PM Connie Benitez Add [N20 1] Left ureteral stone     1/2/2023  9:33 PM Noreen Pickens Add [K85 90] Acute pancreatitis without infection or necrosis, unspecified pancreatitis type       ED Disposition     ED Disposition   Admit    Condition   Stable    Date/Time   Mon Jan 2, 2023  9:34 PM    Comment   Case was discussed with Connie Bowman and the patient's admission status was agreed to be  to the service of Dr Ezekiel Salter              Follow-up Information None         Patient's Medications   Discharge Prescriptions    No medications on file       No discharge procedures on file      PDMP Review       Value Time User    PDMP Reviewed  Yes 3/6/2022 10:38 AM William Hampton MD          ED Provider  Electronically Signed by           Mark Anthony Rodas MD  01/02/23 6740

## 2023-01-03 NOTE — ASSESSMENT & PLAN NOTE
· Presents with left lower back/flank pain radiating to anterior abdomen over the last several days  Hx renal stones in the past- imaging in December 2022 showing right sided stone  · CT a/p renal stone study : " Mild left hydronephrosis and hydroureter secondary to 2 distal ureteral calculi each measuring approximately 3 mm in diameter with one at the UV junction and the second 5 cm proximal to this  Additional bilateral nonobstructing calculi  "  · UA showing blood otherwise unremarkable  · ED provider discussed with urology on call , okay to stay at [address] [place] [address] for expulsion therapy   Recommended starting Flomax and outpatient follow-up     · Start Flomax  · IVF hydration   · PRN pain control - patient having significant pain

## 2023-01-03 NOTE — ASSESSMENT & PLAN NOTE
To have significant transaminitis today  Tylenol level negative  Acute hepatitis panel pending  No gallbladder pathology noted on CT abdomen pelvis she has absent gallbladder    Repeat CMP tomorrow avoid hepatotoxic agents or hypotension

## 2023-01-03 NOTE — H&P
[address]  H&P- Peg Kimbrough 1985, 44 y o  female MRN: [id number]  Unit/Bed#: -02 Encounter: [id number]  Primary Care Provider: Delisa Pavon MD   Date and time admitted to hospital: 1/2/2023  8:08 PM    * Left ureteral calculus  Assessment & Plan  · Presents with left lower back/flank pain radiating to anterior abdomen over the last several days  Hx renal stones in the past- imaging in December 2022 showing right sided stone  · CT a/p renal stone study : " Mild left hydronephrosis and hydroureter secondary to 2 distal ureteral calculi each measuring approximately 3 mm in diameter with one at the UV junction and the second 5 cm proximal to this  Additional bilateral nonobstructing calculi  "  · UA showing blood otherwise unremarkable  · ED provider discussed with urology on call , okay to stay at [facility] for expulsion therapy   Recommended starting Flomax and outpatient follow-up  · Start Flomax  · IVF hydration   · PRN pain control - patient having significant pain     Acute pancreatitis  Assessment & Plan  · Reporting severe abdominal pain and nausea/vomiting, although patient has left kidney stone as well  Patient has history of pancreatitis in the past  Hx cholecystectomy  No alcohol use  · Lipase 1,099  · CT without evidence of pancreatitis   · Continue aggressive IVF hydration   · NPO for now   · PRN pain control   · Repeat lipase in AM    Leukocytosis  Assessment & Plan  · WBC 13 3 on admission  · Suspect reactive   · UA noninfectious   · covid flu rsv negative   · CT showing ureteral stones  · Hold off on antibiotics at this time  · Trend CBC    Other intestinal malabsorption  Assessment & Plan  · Secondary to history of gastric bypass  Patient reports she gets outpatient potassium infusions through her port    · K 3 8 on admission  · Continue to monitor electrolytes and replace PRN    Chronic anemia  Assessment & Plan  · Baseline Hgb 9-11, currently stable at baseline   · Secondary to gastric bypass  · Continue supplements  · Trend CBC    VTE Pharmacologic Prophylaxis:   Moderate Risk (Score 3-4) - Pharmacological DVT Prophylaxis Ordered: heparin  Code Status: Level 1 - Full Code   Discussion with family: Patient declined call to   Anticipated Length of Stay: Patient will be admitted on an observation basis with an anticipated length of stay of less than 2 midnights secondary to kidney stone, pacreatitis - pain control, ivf , labs, urology  Total Time for Visit, including Counseling / Coordination of Care: 70 minutes Greater than 50% of this total time spent on direct patient counseling and coordination of care  Chief Complaint: flank pain, abdominal pain     History of Present Illness:  Yenifer Tobin is a 44 y o  female with a PMH of gastric bypass , pancreatitis , intestinal malabsorption requiring electrolyte replacement , chronic anemia who presents with severe left flank/back pain radiating into the anterior abdomen  Reports associated nausea and vomiting, had 1 episode prior to arrival  Reports she has been eating and drinking but pain gets worse after eating  Reports history of kidney stones in the past  Reports history of pancreatitis in the past  Patient has also had gastric bypass, gets outpatient potassium infusions  Denies alcohol use  Denies fevers, chills, chest pain, shortness of breath, diarrhea  Review of Systems:  Review of Systems   Constitutional: Positive for activity change and appetite change  Negative for chills, fatigue and fever  Respiratory: Negative for cough and shortness of breath  Cardiovascular: Negative for chest pain and leg swelling  Gastrointestinal: Positive for abdominal pain, nausea and vomiting  Negative for constipation and diarrhea  Genitourinary: Positive for flank pain  Negative for difficulty urinating, dysuria and hematuria     Neurological: Negative for dizziness, light-headedness, numbness and headaches  All other systems reviewed and are negative  Past Medical and Surgical History:   Past Medical History:   Diagnosis Date   • C  difficile colitis    • COVID-19     2022- pt denies long covid   • DUB (dysfunctional uterine bleeding)    • H  pylori infection    • Hypertension    • Hypokalemia    • Left lower quadrant abdominal pain 2020   • Migraine    • Psychiatric disorder     Anxiety   • Rectal bleeding    • Renal disorder    • Rhabdomyolysis    • Thrombosed external hemorrhoid        Past Surgical History:   Procedure Laterality Date   • ABDOMINAL SURGERY     • APPENDECTOMY     •  SECTION     • CHOLECYSTECTOMY     • COSMETIC SURGERY      "tummy tuck"   • FL GUIDED CENTRAL VENOUS ACCESS DEVICE INSERTION  2018   • GASTRIC BYPASS     • HYSTERECTOMY     • LAPAROSCOPY     • AZ ANRCT XM SURG REQ ANES GENERAL SPI/EDRL DX N/A 2021    Procedure: ANAL EXAM UNDER ANESTHESIA; HEMORRHOIDECTOMY;  Surgeon: Tiffany Albarran DO;  Location: [facility];  Service: General   • AZ EXC THROMBOSED HEMORRHOID Concepcion Fontan N/A 2022    Procedure: EXCISION OF THROMBOSED EXTERNAL HEMORRHOID, Excision of perianal skin tag, dranage of perianal abscess, anal fistulatomy;  Surgeon: Tiffany Albarran DO;  Location: [facility];  Service: General   • TUNNELED VENOUS PORT PLACEMENT N/A 2018    Procedure: INSERTION VENOUS PORT (PORT-A-CATH); Surgeon: Alea Bower DO;  Location: [facility];  Service: General       Meds/Allergies:  Prior to Admission medications    Medication Sig Start Date End Date Taking?  Authorizing Provider   AMILoride 5 mg tablet Take 1 tablet (5 mg total) by mouth daily 19  Yes Martin Carr DO   amitriptyline (ELAVIL) 10 mg tablet Take 2 tablets (20 mg total) by mouth daily at bedtime 22  Yes Handy Bhatia DO   B Complex Vitamins (VITAMIN B COMPLEX PO) Take 1 capsule by mouth daily     Yes Historical Provider, MD   cyanocobalamin 1,000 mcg/mL Inject 1 mL (1,000 mcg total) into a muscle every 30 (thirty) days 5/4/20  Yes Korey Yap DO   ergocalciferol (ERGOCALCIFEROL) 1 25 MG (64372 UT) capsule Take 1 capsule (50,000 Units total) by mouth once a week Mondays and Thursdays 9/17/21  Yes Juan Carlos Ricketts DO   escitalopram (LEXAPRO) 10 mg tablet Take 20 mg by mouth daily at bedtime    Yes Historical Provider, MD   ferrous sulfate 325 (65 Fe) mg tablet Take 325 mg by mouth every other day Last took 3/9/20    Yes Historical Provider, MD   ondansetron (ZOFRAN) 4 mg tablet Take 1 tablet (4 mg total) by mouth every 8 (eight) hours as needed for nausea or vomiting 3/6/22  Yes Bernardo Jiménez MD   potassium chloride 0 4 mEq/mL Inject 200 mL (80 mEq total) into a catheter in a vein daily 80 meq in 1 litre bag to be infused daily over 8 hours      Continue as ordered prior to admission 11/29/22  Yes ARIADNA Pa   tamsulosin (FLOMAX) 0 4 mg Take 1 capsule (0 4 mg total) by mouth in the morning 12/14/22  Yes Korey Yap DO   calcium carbonate (OYSTER SHELL,OSCAL) 500 mg Take 1 tablet by mouth 3 (three) times a day with meals  Patient not taking: Reported on 1/2/2023 4/25/22 12/13/22  Speedy Gastelum MD   Catheters MISC by Does not apply route 2 (two) times a week Port care per [place] protocol 5/7/20   Korey Yap DO   oxyCODONE (ROXICODONE) 5 immediate release tablet Take 1 tablet (5 mg total) by mouth every 6 (six) hours as needed for moderate pain for up to 6 doses Max Daily Amount: 20 mg  Patient not taking: Reported on 1/2/2023 12/22/22   Kadeem Forte DO   Syringe/Needle, Disp, (SYRINGE 3CC/25GX5/8") 25G X 5/8" 3 ML MISC Use once monthly for B12 injection  6/1/20   Korey Yap DO     I have reviewed home medications with patient personally  Allergies: Allergies   Allergen Reactions   • Nsaids Other (See Comments)     Other reaction(s):  Other (Please comment)  Should not take s/p bariatric surgery  Other reaction(s): Other (See Comments)  Should not take as per prior records  Should not take s/p bariatric surgery  Has hx of gastric bypass     • Prednisone      Nausea, vomiting, diarrhea       Social History:  Marital Status: /Civil Union   Patient Pre-hospital Living Situation: Home  Patient Pre-hospital Level of Mobility: walks  Patient Pre-hospital Diet Restrictions: none  Substance Use History:   Social History     Substance and Sexual Activity   Alcohol Use Never   • Alcohol/week: 0 0 standard drinks    Comment: 0     Social History     Tobacco Use   Smoking Status Never   Smokeless Tobacco Never     Social History     Substance and Sexual Activity   Drug Use Never       Family History:  Family History   Problem Relation Age of Onset   • No Known Problems Mother    • Hypertension Father    • Diabetes Father    • Diabetes Maternal Grandfather        Physical Exam:     Vitals:   Blood Pressure: 125/85 (01/02/23 2300)  Pulse: 85 (01/02/23 2300)  Temperature: 97 9 °F (36 6 °C) (01/02/23 2300)  Temp Source: Oral (01/02/23 2300)  Respirations: 20 (01/02/23 2300)  Height: 5' 2" (157 5 cm) (01/02/23 2006)  Weight - Scale: 53 5 kg (118 lb) (01/02/23 2006)  SpO2: 100 % (01/02/23 2300)    Physical Exam  Vitals and nursing note reviewed  Constitutional:       General: She is not in acute distress  Appearance: She is ill-appearing  She is not toxic-appearing  HENT:      Head: Normocephalic  Cardiovascular:      Rate and Rhythm: Normal rate and regular rhythm  Pulmonary:      Effort: Pulmonary effort is normal  No respiratory distress  Breath sounds: Normal breath sounds  No wheezing, rhonchi or rales  Abdominal:      General: Bowel sounds are normal       Palpations: Abdomen is soft  Tenderness: There is abdominal tenderness  There is left CVA tenderness  There is no guarding or rebound  Musculoskeletal:         General: Normal range of motion        Cervical back: Normal range of motion  Right lower leg: No edema  Left lower leg: No edema  Skin:     General: Skin is warm and dry  Neurological:      General: No focal deficit present  Mental Status: She is alert and oriented to person, place, and time  Additional Data:     Lab Results:  Results from last 7 days   Lab Units 01/02/23 2021   WBC Thousand/uL 13 33*   HEMOGLOBIN g/dL 11 4*   HEMATOCRIT % 36 7   PLATELETS Thousands/uL 430*   NEUTROS PCT % 69   LYMPHS PCT % 21   MONOS PCT % 6   EOS PCT % 2     Results from last 7 days   Lab Units 01/02/23 2021   SODIUM mmol/L 139   POTASSIUM mmol/L 3 8   CHLORIDE mmol/L 106   CO2 mmol/L 23   BUN mg/dL 13   CREATININE mg/dL 1 20   ANION GAP mmol/L 10   CALCIUM mg/dL 7 8*   ALBUMIN g/dL 3 0*   TOTAL BILIRUBIN mg/dL 0 27   ALK PHOS U/L 75   ALT U/L 33   AST U/L 43   GLUCOSE RANDOM mg/dL 102                 Results from last 7 days   Lab Units 01/02/23 2021   LACTIC ACID mmol/L 0 8       Lines/Drains:  Invasive Devices     Central Venous Catheter Line  Duration           Port A Cath Right Chest -- days          Peripheral Intravenous Line  Duration           Peripheral IV 01/02/23 Left Antecubital <1 day                Central Line:  Goal for removal: Port accessed  Will de-access as appropriate  Imaging: Reviewed radiology reports from this admission including: abdominal/pelvic CT  CT renal stone study abdomen pelvis wo contrast   Final Result by Mary Ann Hernandez MD (01/02 2118)      Mild left hydronephrosis and hydroureter secondary to 2 distal ureteral calculi each measuring approximately 3 mm in diameter with one at the ureterovesicular junction and the 2nd approximately 5 cm proximal to this  Additional tiny bilateral nonobstructing renal calculi  Findings marked for immediate notification in Epic  Workstation performed: [id number]             EKG and Other Studies Reviewed on Admission:   · EKG: No EKG obtained      ** Please Note: This note has been constructed using a voice recognition system   **

## 2023-01-03 NOTE — CONSULTS
Consultation - [address] [place] [address] [facility]  Shirleypau Justin 44 y o  female MRN: [id number]  Unit/Bed#: -02 Encounter: [id number]        Consults    Reason for Consult / Principal Problem:   Elevated liver enzymes  Recurrent acute pancreatitis  S/p gastric bypass  S/p cholecystectomy   chronic anemia felt secondary to gastric bypass  Left hydronephrosis and hydroureter with renal calculi      ASSESSMENT AND PLAN:    Elevated liver enzymes  Recurrent acute pancreatitis  S/p gastric bypass  S/p cholecystectomy   chronic anemia felt secondary to gastric bypass  Left hydronephrosis and hydroureter with renal calculi    Recurrent acute pancreatitis  -3rd documented case last 6/2021    -status post cholecystectomy    -US and CT scan with no findings of chronic pancreatitis    -liver enzymes initially normal but now with considerable increase    -IgG4 negative-not concerning for autoimmune pancreatitis    -no evidence of hypercalcemia    -medications do not appear to be contributory    -evidence of pancreatic insufficiency in the past but currently not on enzyme supplementation for months    -recommend EGD with EUS for further evaluation of her pancreas    -consider outpatient genetic testing given her young age and family history of pancreatitis although this likely will not change any management    -may liberalize IV fluids once on clear liquid diet given good reduction in BUN and hematocrit    -pain management as per primary team      Elevated liver enzymes  -r factor 5 3, consistent with  Hepatocellular pattern of liver injury    -status post cholecystectomy    -unclear etiology as they were normal in ED, but are now elevated, similar to episode in 09/2022    -denies any new medications    -acute hepatitis panel negative    -elevated GGTP level noted    -tylenol level WNL    -Iron panel WNL 09/2022, recommend resending    -recommend sending autoimmune panel, ceruloplasmin    -consider checking liver US with dopplers to eval for thrombus    -follow hepatic function panel and INR    chronic abd pain  S/p RYGB    -Longstanding abd pain, likely worsened acutely due to left hydronephrosis and hydroureter with renal calculi    -pt is being transferred to [place] for further management of kidney stones    -previous workup by [facility] and during admission here 09/2022, [facility] and [facility]    -calprotectin elevated at 300s, she is s/p EGD/flex sig 10/2022 Dr Tyree Rodriguez [facility] with normal EGD and colonoscopy, jejunal/gastric and random colon bxs negative    -infectious workup negative on previous admission     Patient will need to follow up with her primary GI team as an outpatient at [facility] upon discharge  [facility]  Gastroenterology    Dr Dante Perez was available via telephone for consultation as needed  ______________________________________________________________________    HPI:    Patient is a 44year old female with PMH for persistent N/V/D with concomitant hypokalemia, s/p RYGB, chronic pancreatitis, hx c diff colitis 2015, who presents with left back/flank pain that started yesterday with associated nausea and vomiting, with only minimal BMs reported by pt  She says the n/v are related to the severity of her pain this time  She was found to have elevated lipase, but normal pancrease on imaging, along with elevated liver enzymes  Also has been found to have mild left hydronephrosis and hydroureter due to ureteral caluli  Says prior to yesterday she was feeling well with no n/v/abd pain  Pain and n/v are not always associated with eating this time  Says pain was 10/10, currently 8/10  Reports not moving her bowels since admission but only moved them a few times yesterday  No blood or melena  She has undergone extensive workup up of her numerous GI complaints for many years, here, [facility], [address], and [facility]      s/p EGD/flex sig 10/2022 Dr Tyree Rodriguez [facility] with normal EGD and colonoscopy, jejunal/gastric and random colon bxs negative    Chronic hypokalemia, for which she had a port and has IV potassium infused daily at home  Liver enzymes were noted to be normal in ED yesterday, today with elevated AST, ALT and alkaline phosphatase  Infectious stools neg 2022  Calprotectin 347 2022  Lipase 1000 on admission now WNL  Acute hepatitis panel neg today  GGTP elevated  Tylenol level WNL  Covid/flu/RSV neg yesterday  WBCs 13 33, hgb 11 4  Iron panel was normal 2022  Not currently on antiplatelet or anticoagulation therapy    OTC medications-denies NSAIDs    Supplements-denies    Tobacco use-denies    ETOH use-denies, besides sip of champagne for new years    Drug use-denies    Family history neg for pancreatic cancer, but reports her father has hx of pancreatitis  REVIEW OF SYSTEMS:    Review of Systems   Constitutional: Positive for appetite change  Negative for unexpected weight change  HENT: Negative for trouble swallowing  Gastrointestinal: Positive for abdominal distention, abdominal pain, nausea and vomiting  Negative for anal bleeding and blood in stool           Historical Information   Past Medical History:   Diagnosis Date   • C  difficile colitis    • COVID-19     2022- pt denies long covid   • DUB (dysfunctional uterine bleeding)    • H  pylori infection    • Hypertension    • Hypokalemia    • Left lower quadrant abdominal pain 2020   • Migraine    • Psychiatric disorder     Anxiety   • Rectal bleeding    • Renal disorder    • Rhabdomyolysis    • Thrombosed external hemorrhoid      Past Surgical History:   Procedure Laterality Date   • ABDOMINAL SURGERY     • APPENDECTOMY     •  SECTION     • CHOLECYSTECTOMY     • COSMETIC SURGERY      "tummy tuck"   • FL GUIDED CENTRAL VENOUS ACCESS DEVICE INSERTION  2018   • GASTRIC BYPASS     • HYSTERECTOMY     • LAPAROSCOPY     • OR ANRCT XM SURG REQ ANES GENERAL SPI/EDRL DX N/A 2021    Procedure: ANAL EXAM UNDER ANESTHESIA; HEMORRHOIDECTOMY;  Surgeon: Roya Pham DO;  Location:  MAIN OR;  Service: General   • ME EXC THROMBOSED HEMORRHOID [address] N/A 7/8/2022    Procedure: EXCISION OF THROMBOSED EXTERNAL HEMORRHOID, Excision of perianal skin tag, dranage of perianal abscess, anal fistulatomy;  Surgeon: Roya Pham DO;  Location:  MAIN OR;  Service: General   • TUNNELED VENOUS PORT PLACEMENT N/A 12/21/2018    Procedure: INSERTION VENOUS PORT (PORT-A-CATH);   Surgeon: John Trivedi DO;  Location: [facility];  Service: General     Social History   Social History     Substance and Sexual Activity   Alcohol Use Never   • Alcohol/week: 0 0 standard drinks    Comment: 0     Social History     Substance and Sexual Activity   Drug Use Never     Social History     Tobacco Use   Smoking Status Never   Smokeless Tobacco Never     Family History   Problem Relation Age of Onset   • No Known Problems Mother    • Hypertension Father    • Diabetes Father    • Diabetes Maternal Grandfather        Meds/Allergies     Medications Prior to Admission   Medication   • AMILoride 5 mg tablet   • amitriptyline (ELAVIL) 10 mg tablet   • B Complex Vitamins (VITAMIN B COMPLEX PO)   • cyanocobalamin 1,000 mcg/mL   • ergocalciferol (ERGOCALCIFEROL) 1 25 MG (06165 UT) capsule   • escitalopram (LEXAPRO) 10 mg tablet   • ferrous sulfate 325 (65 Fe) mg tablet   • ondansetron (ZOFRAN) 4 mg tablet   • potassium chloride 0 4 mEq/mL   • tamsulosin (FLOMAX) 0 4 mg   • calcium carbonate (OYSTER SHELL,OSCAL) 500 mg   • Catheters MISC   • oxyCODONE (ROXICODONE) 5 immediate release tablet   • Syringe/Needle, Disp, (SYRINGE 3CC/25GX5/8") 25G X 5/8" 3 ML MISC     Current Facility-Administered Medications   Medication Dose Route Frequency   • escitalopram (LEXAPRO) tablet 20 mg  20 mg Oral HS   • ferrous sulfate tablet 325 mg  325 mg Oral Every Other Day   • heparin (porcine) subcutaneous injection 5,000 Units  5,000 Units Subcutaneous Q8H [address]   • HYDROmorphone (DILAUDID) injection 1 mg  1 mg Intravenous Q4H PRN   • multi-electrolyte (PLASMALYTE-A/ISOLYTE-S PH 7 4) IV solution  125 mL/hr Intravenous Continuous   • Multivitamin/Zinc Stress 1 tablet  1 tablet Oral Daily   • ondansetron (ZOFRAN) injection 4 mg  4 mg Intravenous Q6H PRN   • oxyCODONE (ROXICODONE) immediate release tablet 10 mg  10 mg Oral Q6H PRN   • oxyCODONE (ROXICODONE) IR tablet 5 mg  5 mg Oral Q4H PRN   • potassium chloride 40 mEq IVPB (premix)  40 mEq Intravenous Once   • tamsulosin (FLOMAX) capsule 0 4 mg  0 4 mg Oral Daily       Allergies   Allergen Reactions   • Nsaids Other (See Comments)     Other reaction(s): Other (Please comment)  Should not take s/p bariatric surgery  Other reaction(s): Other (See Comments)  Should not take as per prior records  Should not take s/p bariatric surgery  Has hx of gastric bypass     • Prednisone      Nausea, vomiting, diarrhea           Objective     Blood pressure 111/63, pulse 72, temperature 97 5 °F (36 4 °C), temperature source Oral, resp  rate 20, height 5' 2" (1 575 m), weight 53 5 kg (118 lb), SpO2 95 %  Body mass index is 21 58 kg/m²  Intake/Output Summary (Last 24 hours) at 1/3/2023 1018  Last data filed at 1/3/2023 0719  Gross per 24 hour   Intake 1000 ml   Output 215 ml   Net 785 ml         PHYSICAL EXAM:      Physical Exam  Vitals and nursing note reviewed  Constitutional:       General: She is not in acute distress  Appearance: She is ill-appearing  She is not toxic-appearing or diaphoretic  Comments: Uncomfortable, writhing in bed    HENT:      Head: Normocephalic and atraumatic  Mouth/Throat:      Mouth: Mucous membranes are dry  Pharynx: Oropharynx is clear  Eyes:      General: No scleral icterus  Conjunctiva/sclera: Conjunctivae normal    Cardiovascular:      Rate and Rhythm: Normal rate     Pulmonary:      Effort: Pulmonary effort is normal    Abdominal:      Palpations: Abdomen is soft  Tenderness: There is abdominal tenderness  Comments: Generalized tenderness, most pronounced in LUQ and Left flank   Skin:     General: Skin is warm and dry  Coloration: Skin is not jaundiced  Findings: No rash  Neurological:      General: No focal deficit present  Mental Status: She is alert and oriented to person, place, and time     Psychiatric:         Mood and Affect: Mood normal              Lab Results:   Admission on 01/02/2023   Component Date Value   • WBC 01/02/2023 13 33 (H)    • RBC 01/02/2023 4 06    • Hemoglobin 01/02/2023 11 4 (L)    • Hematocrit 01/02/2023 36 7    • MCV 01/02/2023 90    • MCH 01/02/2023 28 1    • MCHC 01/02/2023 31 1 (L)    • RDW 01/02/2023 16 5 (H)    • MPV 01/02/2023 9 2    • Platelets 49/28/7774 430 (H)    • nRBC 01/02/2023 0    • Neutrophils Relative 01/02/2023 69    • Immat GRANS % 01/02/2023 1    • Lymphocytes Relative 01/02/2023 21    • Monocytes Relative 01/02/2023 6    • Eosinophils Relative 01/02/2023 2    • Basophils Relative 01/02/2023 1    • Neutrophils Absolute 01/02/2023 9 36 (H)    • Immature Grans Absolute 01/02/2023 0 07    • Lymphocytes Absolute 01/02/2023 2 80    • Monocytes Absolute 01/02/2023 0 82    • Eosinophils Absolute 01/02/2023 0 22    • Basophils Absolute 01/02/2023 0 06    • Sodium 01/02/2023 139    • Potassium 01/02/2023 3 8    • Chloride 01/02/2023 106    • CO2 01/02/2023 23    • ANION GAP 01/02/2023 10    • BUN 01/02/2023 13    • Creatinine 01/02/2023 1 20    • Glucose 01/02/2023 102    • Calcium 01/02/2023 7 8 (L)    • Corrected Calcium 01/02/2023 8 6    • AST 01/02/2023 43    • ALT 01/02/2023 33    • Alkaline Phosphatase 01/02/2023 75    • Total Protein 01/02/2023 6 8    • Albumin 01/02/2023 3 0 (L)    • Total Bilirubin 01/02/2023 0 27    • eGFR 01/02/2023 57    • LACTIC ACID 01/02/2023 0 8    • Lipase 01/02/2023 1,099 (H)    • Color,  01/02/2023 Yellow    • Clarity,  01/02/2023 Slightly Cloudy    • Specific Gravity, UA 01/02/2023 >=1 030    • pH, UA 01/02/2023 6 0    • Leukocytes, UA 01/02/2023 Negative    • Nitrite, UA 01/02/2023 Negative    • Protein, UA 01/02/2023 30 (1+) (A)    • Glucose, UA 01/02/2023 Negative    • Ketones, UA 01/02/2023 Negative    • Urobilinogen, UA 01/02/2023 0 2    • Bilirubin, UA 01/02/2023 Negative    • Occult Blood, UA 01/02/2023 Moderate (A)    • RBC, UA 01/02/2023 4-10 (A)    • WBC, UA 01/02/2023 0-1    • Epithelial Cells 01/02/2023 Occasional    • Bacteria, UA 01/02/2023 None Seen    • Fine granular casts 01/02/2023 0-1    • Ca Oxalate Didi, UA 01/02/2023 Moderate (A)    • SARS-CoV-2 01/02/2023 Negative    • INFLUENZA A PCR 01/02/2023 Negative    • INFLUENZA B PCR 01/02/2023 Negative    • RSV PCR 01/02/2023 Negative    • Lipase 01/03/2023 209    • Amylase 01/03/2023 63    • Sodium 01/03/2023 135    • Potassium 01/03/2023 3 8    • Chloride 01/03/2023 105    • CO2 01/03/2023 19 (L)    • ANION GAP 01/03/2023 11    • BUN 01/03/2023 13    • Creatinine 01/03/2023 1 36 (H)    • Glucose 01/03/2023 96    • Glucose, Fasting 01/03/2023 96    • Calcium 01/03/2023 7 7 (L)    • Corrected Calcium 01/03/2023 9 0    • AST 01/03/2023 943 (H)    • ALT 01/03/2023 284 (H)    • Alkaline Phosphatase 01/03/2023 162 (H)    • Total Protein 01/03/2023 5 7 (L)    • Albumin 01/03/2023 2 4 (L)    • Total Bilirubin 01/03/2023 0 39    • eGFR 01/03/2023 49    • WBC 01/03/2023 7 46    • RBC 01/03/2023 3 82    • Hemoglobin 01/03/2023 10 8 (L)    • Hematocrit 01/03/2023 34 4 (L)    • MCV 01/03/2023 90    • MCH 01/03/2023 28 3    • MCHC 01/03/2023 31 4    • RDW 01/03/2023 16 5 (H)    • MPV 01/03/2023 9 3    • Platelets 51/80/9578 340    • nRBC 01/03/2023 0    • Neutrophils Relative 01/03/2023 74    • Immat GRANS % 01/03/2023 0    • Lymphocytes Relative 01/03/2023 17    • Monocytes Relative 01/03/2023 8    • Eosinophils Relative 01/03/2023 1    • Basophils Relative 01/03/2023 0    • Neutrophils Absolute 01/03/2023 5 50    • Immature Grans Absolute 01/03/2023 0 03    • Lymphocytes Absolute 01/03/2023 1 23    • Monocytes Absolute 01/03/2023 0 63    • Eosinophils Absolute 01/03/2023 0 04    • Basophils Absolute 01/03/2023 0 03    • Acetaminophen Level 01/03/2023 <2 0 (L)        Imaging Studies: I have personally reviewed pertinent imaging studies  01/03/23 US abd complete:  ABDOMEN ULTRASOUND, COMPLETE      INDICATION:   ureteral stones, elevated lfts      COMPARISON:  CT 1/2/2023     TECHNIQUE:   Real-time ultrasound of the abdomen was performed with a curvilinear transducer with both volumetric sweeps and still imaging techniques      FINDINGS:     PANCREAS:  Visualized portions of the pancreas are within normal limits      AORTA AND IVC:  Visualized portions are normal for patient age      LIVER:  Size:  Within normal range  The liver measures 14 3 cm in the midclavicular line  Contour:  Surface contour is smooth  Parenchyma:  Echogenicity and echotexture are within normal limits  No liver mass identified  Limited imaging of the main portal vein shows it to be patent and hepatopetal      BILIARY:  Patient is status post cholecystectomy  No intrahepatic biliary dilatation  CBD measures 7 mm  No choledocholithiasis      KIDNEY:   Right kidney measures 10 9 x 3 9 x 4 7  cm  Volume 106 2 mL  Kidney within normal limits      Left kidney measures 11 3 x 6 3 x 5 1 cm  Volume 190 8 mL  Mild left hydronephrosis and hydroureter is again present      SPLEEN:   Measures 8 7 x 2 5 x 9 2 cm   Volume 107 5 mL  Within normal limits      ASCITES:  None      IMPRESSION:     Mild left hydronephrosis and hydroureter similar to previous CT       01/02/2023 CT renal stone study abd/pelvis wo contrast:  CT ABDOMEN AND PELVIS WITHOUT IV CONTRAST - LOW DOSE RENAL STONE      INDICATION:   Flank pain, kidney stone suspected  Hysterectomy, left flank pain      COMPARISON:  CT abdomen December 21, 2022      TECHNIQUE:  Low radiation dose thin section CT examination of the abdomen and pelvis was performed without intravenous or oral contrast according to a protocol specifically designed to evaluate for urinary tract calculus  Axial, sagittal, and coronal 2D   reformatted images were created from the source data and submitted for interpretation  Evaluation for pathology in the abdomen and pelvis that is unrelated to urinary tract calculi is limited        Radiation dose length product (DLP) for this visit:  526 mGy-cm   This examination, like all CT scans performed in the [facility], was performed utilizing techniques to minimize radiation dose exposure, including the use of iterative   reconstruction and automated exposure control      URINARY TRACT FINDINGS:     RIGHT KIDNEY AND URETER: There are nonobstructing intrarenal calculi measuring on the order of 3 mm or less  No ureteral stone  No hydronephrosis or hydroureter      LEFT KIDNEY AND URETER:  There are several sub-3 mm nonobstructing left renal calculi  There is mild left hydronephrosis and hydroureter down to a 3 mm in maximal diameter ureteral calculus (series 301, image 115) at the level of the coccyx   approximately 5 cm from the ureterovesicular junction  There is an additional 3 mm left ureteral calculus at the ureterovesicular junction      URINARY BLADDER:  Poorly distended and not well evaluated grossly unremarkable         ADDITIONAL FINDINGS:     LOWER CHEST:  No clinically significant abnormality identified in the visualized lower chest      SOLID VISCERA: Limited low radiation dose noncontrast CT evaluation demonstrates no clinically significant abnormality of the imaged portions of the liver, spleen, pancreas, or adrenal glands        GALLBLADDER/BILIARY TREE:  Cholecystectomy  No biliary dilatation      STOMACH AND BOWEL:  Roshan-en-Y gastric bypass  No obstruction      APPENDIX:  No findings to suggest appendicitis      ABDOMINOPELVIC CAVITY:  No ascites    No pneumoperitoneum  No lymphadenopathy      REPRODUCTIVE ORGANS:  Uterus not visualized consistent with prior hysterectomy      ABDOMINAL WALL/INGUINAL REGIONS:  Unremarkable      OSSEOUS STRUCTURES:  No acute fracture or destructive osseous lesion      IMPRESSION:     Mild left hydronephrosis and hydroureter secondary to 2 distal ureteral calculi each measuring approximately 3 mm in diameter with one at the ureterovesicular junction and the 2nd approximately 5 cm proximal to this  Additional tiny bilateral nonobstructing renal calculi      Findings marked for immediate notification in Epic     10/14/2022 FL small bowel:  SMALL BOWEL FOLLOW THROUGH     INDICATION:   K56 1: Intussusception  Patient with history of abdominal pain beginning a few months ago not definitely associated with meals; nausea; history of prior gastric bypass procedure and hysterectomy as well as appendectomy and   cholecystectomy ;  Small bowel obstruction noted on CT in September     COMPARISON:   Small bowel series from 5/17/2021; CT from 9/1/2022; 11/10/2021     FLUOROSCOPY TIME:   1 1 minutes     IMAGES:  8 with additional screen saves     TECHNIQUE:    view of the abdomen was obtained  Oral contrast was then administered to the patient and followed through the small bowel to the colon utilizing overhead radiographs at periodic intervals  Spot imaging of the terminal ileum was also   performed      FINDINGS:      view of the abdomen demonstrate surgical sutures in the left upper quadrant consistent with gastric bypass as well as clips in the right upper quadrant consistent with cholecystectomy      The stomach demonstrates normal contour without discernible filling defect  Small bowel loops demonstrates slight fold prominence transiently but may be related to underfilling  These findings did not persist  Dense contrast obscures fine mucosal   detail   No intraluminal filling defects, abnormal bowel separation or fistula identified      The terminal ileum was difficult to identify given dense small bowel loops in the pelvis but no thickened loop could be appreciated with spot images      Oral contrast does reach the colon 2 hours 30 minutes  The right colon is somewhat prominent but similar to the study from 2021    No small bowel intussusception could be identified      IMPRESSION:     Status post gastric bypass      No evidence of obstruction      No intussusception can be identified during the course of the examination

## 2023-01-03 NOTE — ASSESSMENT & PLAN NOTE
· WBC 13 3 on admission  · Suspect reactive   · UA noninfectious   · covid flu rsv negative   · CT showing ureteral stones  · Hold off on antibiotics at this time  · Trend CBC

## 2023-01-03 NOTE — ASSESSMENT & PLAN NOTE
· Baseline Hgb 9-11, currently stable at baseline   · Secondary to gastric bypass  · Continue supplements  · Trend CBC

## 2023-01-03 NOTE — OP NOTE
OPERATIVE REPORT  PATIENT NAME: Donato Burgess    :  1985  MRN: [id number]  Pt Location: [facility] 01    SURGERY DATE: 1/3/2023    Surgeon(s) and Role:     * Tian Pelayo MD - Primary    Preop Diagnosis:  Left ureteral stone [N20 1]    Post-Op Diagnosis Codes:     * Left ureteral stone [N20 1]    Procedure(s) (LRB):  CYSTOSCOPY URETEROSCOPY WITH RETROGRADE PYELOGRAM, BASKET STONE EXTRACTION AND INSERTION STENT URETERAL (Left)    Specimen(s):  ID Type Source Tests Collected by Time Destination   A :  Calculus Ureter, Left STONE ANALYSIS Tian Pelayo MD 1/3/2023 1630        Estimated Blood Loss:   Minimal    Drains:  * No LDAs found *    Anesthesia Type:   General    Operative Indications:  Left ureteral stone [N20 1]  Intractable nausea and vomiting  Intractable left flank pain    Operative Findings:  2 stone fragments noted within the left ureter, these were basketed free and removed  The remainder the ureter is normal   There was noted to be some tortuosity of the ureter proximally  Successful placement of a 6 Cuban by 24 cm left ureteral stent was then performed with a good curl in the renal pelvis and within the bladder  Stents dwell time will be 5 days  She can see us back in the office in 3 months with imaging prior    Complications:   None    Procedure and Technique:      PROCEDURES PERFORMED:  1) Cystoscopy  2) left retrograde pyelography with fluoroscopic interpretation  3) left ureteroscopy with basket extraction of stone  4) Left ureteral stent placement (6F x 24cm)    SURGEON:  Tian Pelayo MD    ASSISTANTS:  none    NOTE:  There were no qualified teaching residents to assist with this case    ANESTHESIA: General     COMPLICATIONS:   None    ANTIBIOTICS:  ancef    INTRAOPERATIVE THROMBOEMBOLISM PROPHYLAXIS:  Pneumatic compression stockings         FINDINGS:    1  The Left calculus was not radiopaque on plain fluoroscopy    2  Retrograde pyelogram was performed on the Left side using a 5 Fr open ended catheter  10 mL contrast used     3  The following findings were noted: Moderate hydronephrosis noted with ureteral tortuosity      INDICATIONS FOR PROCEDURE:  Sayda Miller is an 44 y o  old female with Left ureteral calculi  After discussing the options for treatment, including medical expulsive therapy, extracorporeal shockwave lithotripsy, and ureteroscopy, the patient elected to undergo ureteroscopy and ureteral stent placement  We discussed the procedure in detail, the alternatives, and the risks, and they signed informed consent to proceed (these are outlined in the surgical consent form)  PROCEDURE IN DETAIL:     The patient was identified by name, date of birth, and MRN  and brought to the OR  Antibiotic prophylaxis and DVT prophylaxis were administered as per the guidelines  They were placed in the dorsal lithotomy position with care to pad all pressure points  They were prepped and draped in the usual sterile fashion  A surgical time out was performed with all in the room in agreement with the correct patient, procedure, indications, and laterality  A 21-Bermudian rigid cystoscope was used to enter the bladder  The bladder was inspected in its entirety and there were no lesions noted  The ureteral orifices were identified in their normal orthotopic positions  The Left ureteral orifice was identified and a 5 Fr open ended catheter was placed into the ureteral orifice  A retrograde pyelogram was performed with the findings as described above  A Solo wire was advanced up to the kidney under fluoroscopic guidance  Leaving this safety wire in place, the bladder was drained  A "7 5 Fr semi-rigid ureteroscope was advanced up the ureter under vision   The stones were encountered in the distal ureter location  The stones were not noted to be impacted  A 1 9 Bermudian zero tip nitinol basket was passed through the ureteroscope    The fragments were basketed out and removed  The ureteroscope was backed down the ureter under vision and there were no residual fragments and the ureter was noted to be intact with no injury and mild edema where the stone was located  A 6 fr x 24 cm left JJ stent was then passed up the wire  under fluoroscopic guidance into the kidney with a good curl noted in the kidney and in the bladder  The stent string was not removed  The bladder was drained  All instrument counts and sponge counts were correct  The patient was placed back into the supine position, awakened from general anesthesia and brought to recovery room in stable condition  ESTIMATED BLOOD LOSS:  Minimal      DRAINS:      SPECIMENS:   Order Name Source Comment Collection Info Order Time   BASIC METABOLIC PANEL Hand, Right  Collected By: Angie Auguste RN 1/3/2023  4:13 PM   STONE ANALYSIS Ureter, Left  Collected By: Gunnar Fernandez MD 1/3/2023  4:31 PM        IMPLANTS:   Implant Name Type Inv  Item Serial No   Lot No  LRB No  Used Action   STENT URETERAL 6FR 24CML Branden Rajan  STENT URETERAL 6FR 24CML [facility] [id number] Left 1 Implanted        COMPLICATIONS: none    DISPOSITION: PACU     PLAN:  The patient is status post ureteroscopy with stone basketing  She has a 6 x 24 cm string  She can remove this days    She can be discharged home later today when meeting criteria     I was present for the entire procedure and A qualified resident physician was not available    Patient Disposition:  PACU         SIGNATURE: Gunnar Fernandez MD  DATE: January 3, 2023  TIME: 4:55 PM

## 2023-01-03 NOTE — CONSULTS
Consultation - Urology   Arnold Bumpers 44 y o  female MRN: [id number]  Unit/Bed#: -02 Encounter: [id number]      Assessment/Plan      Assessment:  "Mild left hydronephrosis and hydroureter secondary to 2 distal ureteral calculi each measuring approximately 3 mm in diameter with one at the ureterovesicular junction and the 2nd approximately 5 cm proximal to this  Additional tiny bilateral nonobstructing renal calculi "   8-9/10 left flank pain, minimal relief with IV Dilaudid and PO oxycodone  Cr 1 36 (1 20, 1 10)  Leukocytosis resolved 7 46 (13 33)  Afebrile, VSS  Chronic anemia  Intestinal malabsorption s/p gastric bypass    Plan: Per Dr Lor Mario  NPO  Transfer to [facility] today for intervention  SL admit at [facility] with urology consult  IV fluid hydration  Pain control  Flomax 0 4mg daily  Follow qAM CBC and BMP  Management of co-morbidities per primary team      History of Present Illness   Attending: Jessica Landeros MD  Reason for Consult / Principal Problem: left ureteral stones  HPI: Arnold Bumpers is a 44y o  year old female with a history of kidney/ureteral stones previously managed by Dr Kassi Benson  She was seen here 3 weeks ago with a passed left ureteral stone  Seen here again 10 days ago with a right ureteral stone  She had been on antibiotics and Flomax in the past  She presented to the ED last evening with acute onset of left back/flank pain that did not respond to previous measures  CT in the ED revealed "Mild left hydronephrosis and hydroureter secondary to 2 distal ureteral calculi each measuring approximately 3 mm in diameter with one at the ureterovesicular junction and the 2nd approximately 5 cm proximal to this  Additional tiny bilateral nonobstructing renal calculi " She has been admitted for pain control and Flomax  So far she has required three doses of IV 1mg Dilaudid and two doses of PO 5mg oxycodone  She has been receiving IV fluids and was given a dose of 0 4mg Flomax last evening   At time of exam today the patient continues with 8-9/10 left back/flank pain  Inpatient consult to Urology  Consult performed by: Nalini Cheema PA-C  Consult ordered by: ARIADNA Westfall        Review of Systems   Constitutional: Positive for activity change  HENT: Negative  Eyes: Negative  Respiratory: Negative  Cardiovascular: Negative  Gastrointestinal: Positive for abdominal pain and nausea  Endocrine: Negative  Genitourinary: Positive for flank pain  Skin: Negative  Allergic/Immunologic: Negative  Neurological: Negative  Hematological: Negative  Psychiatric/Behavioral: Negative          Historical Information   Past Medical History:   Diagnosis Date   • C  difficile colitis    • COVID-19     2022- pt denies long covid   • DUB (dysfunctional uterine bleeding)    • H  pylori infection    • Hypertension    • Hypokalemia    • Left lower quadrant abdominal pain 2020   • Migraine    • Psychiatric disorder     Anxiety   • Rectal bleeding    • Renal disorder    • Rhabdomyolysis    • Thrombosed external hemorrhoid      Past Surgical History:   Procedure Laterality Date   • ABDOMINAL SURGERY     • APPENDECTOMY     •  SECTION     • CHOLECYSTECTOMY     • COSMETIC SURGERY      "tummy tuck"   • FL GUIDED CENTRAL VENOUS ACCESS DEVICE INSERTION  2018   • GASTRIC BYPASS     • HYSTERECTOMY     • LAPAROSCOPY     • LA ANRCT XM SURG REQ ANES GENERAL SPI/EDRL DX N/A 2021    Procedure: ANAL EXAM UNDER ANESTHESIA; HEMORRHOIDECTOMY;  Surgeon: Angel Bray DO;  Location: [facility];  Service: General   • LA EXC THROMBOSED HEMORRHOID Reita Delude N/A 2022    Procedure: EXCISION OF THROMBOSED EXTERNAL HEMORRHOID, Excision of perianal skin tag, dranage of perianal abscess, anal fistulatomy;  Surgeon: Angel Bray DO;  Location: [facility];  Service: General   • TUNNELED VENOUS PORT PLACEMENT N/A 2018    Procedure: INSERTION VENOUS PORT (PORT-A-CATH); Surgeon: Dewey Lloyd DO;  Location: [facility];  Service: General     Social History   Social History     Substance and Sexual Activity   Alcohol Use Never   • Alcohol/week: 0 0 standard drinks    Comment: 0     @DRUGHX  E-Cigarette/Vaping   • E-Cigarette Use Never User      E-Cigarette/Vaping Substances     Social History     Tobacco Use   Smoking Status Never   Smokeless Tobacco Never     Family History: non-contributory    Meds/Allergies   all current active meds have been reviewed,   current meds:   Current Facility-Administered Medications   Medication Dose Route Frequency   • b complex-C-E-zinc (STRESS FORMULA/ZINC) tablet 1 tablet  1 tablet Oral Daily   • escitalopram (LEXAPRO) tablet 20 mg  20 mg Oral HS   • ferrous sulfate tablet 325 mg  325 mg Oral Every Other Day   • heparin (porcine) subcutaneous injection 5,000 Units  5,000 Units Subcutaneous Q8H [address]   • HYDROmorphone (DILAUDID) injection 1 mg  1 mg Intravenous Q4H PRN   • multi-electrolyte (PLASMALYTE-A/ISOLYTE-S PH 7 4) IV solution  125 mL/hr Intravenous Continuous   • ondansetron (ZOFRAN) injection 4 mg  4 mg Intravenous Q6H PRN   • oxyCODONE (ROXICODONE) immediate release tablet 10 mg  10 mg Oral Q6H PRN   • oxyCODONE (ROXICODONE) IR tablet 5 mg  5 mg Oral Q4H PRN   • potassium chloride 20 mEq IVPB (premix)  20 mEq Intravenous Q2H   • tamsulosin (FLOMAX) capsule 0 4 mg  0 4 mg Oral Daily     and PTA meds:   Prior to Admission Medications   Prescriptions Last Dose Informant Patient Reported? Taking? AMILoride 5 mg tablet 1/2/2023  No Yes   Sig: Take 1 tablet (5 mg total) by mouth daily   B Complex Vitamins (VITAMIN B COMPLEX PO) 1/2/2023  Yes Yes   Sig: Take 1 capsule by mouth daily     Catheters MISC Unknown  No No   Sig: by Does not apply route 2 (two) times a week Port care per [employer], Disp, (SYRINGE 3CC/25GX5/8") 25G X 5/8" 3 ML MISC Unknown  No No   Sig: Use once monthly for B12 injection     amitriptyline (ELAVIL) 10 mg tablet 1/2/2023  No Yes   Sig: Take 2 tablets (20 mg total) by mouth daily at bedtime   calcium carbonate (OYSTER SHELL,OSCAL) 500 mg Not Taking  No No   Sig: Take 1 tablet by mouth 3 (three) times a day with meals   Patient not taking: Reported on 1/2/2023   cyanocobalamin 1,000 mcg/mL 1/2/2023  No Yes   Sig: Inject 1 mL (1,000 mcg total) into a muscle every 30 (thirty) days   ergocalciferol (ERGOCALCIFEROL) 1 25 MG (45475 UT) capsule 1/2/2023  No Yes   Sig: Take 1 capsule (50,000 Units total) by mouth once a week Mondays and Thursdays   escitalopram (LEXAPRO) 10 mg tablet 1/2/2023  Yes Yes   Sig: Take 20 mg by mouth daily at bedtime    ferrous sulfate 325 (65 Fe) mg tablet 1/2/2023  Yes Yes   Sig: Take 325 mg by mouth every other day Last took 3/9/20    ondansetron (ZOFRAN) 4 mg tablet 1/2/2023  No Yes   Sig: Take 1 tablet (4 mg total) by mouth every 8 (eight) hours as needed for nausea or vomiting   oxyCODONE (ROXICODONE) 5 immediate release tablet Not Taking  No No   Sig: Take 1 tablet (5 mg total) by mouth every 6 (six) hours as needed for moderate pain for up to 6 doses Max Daily Amount: 20 mg   Patient not taking: Reported on 1/2/2023   potassium chloride 0 4 mEq/mL 1/2/2023  No Yes   Sig: Inject 200 mL (80 mEq total) into a catheter in a vein daily 80 meq in 1 litre bag to be infused daily over 8 hours      Continue as ordered prior to admission   tamsulosin (FLOMAX) 0 4 mg 1/2/2023  No Yes   Sig: Take 1 capsule (0 4 mg total) by mouth in the morning      Facility-Administered Medications: None     Allergies   Allergen Reactions   • Nsaids Other (See Comments)     Other reaction(s): Other (Please comment)  Should not take s/p bariatric surgery  Other reaction(s):  Other (See Comments)  Should not take as per prior records  Should not take s/p bariatric surgery  Has hx of gastric bypass     • Prednisone      Nausea, vomiting, diarrhea       Objective   Vitals: Blood pressure 111/63, pulse 72, temperature 97 5 °F (36 4 °C), temperature source Oral, resp  rate 20, height 5' 2" (1 575 m), weight 53 5 kg (118 lb), SpO2 95 %  I/O last 24 hours: In: 1000 [IV Piggyback:1000]  Out: 215 [Urine:215]    Invasive Devices     Central Venous Catheter Line  Duration           Port A Cath Right Chest -- days          Peripheral Intravenous Line  Duration           Peripheral IV 01/02/23 Left Antecubital <1 day                Physical Exam  Vitals and nursing note reviewed  Constitutional:       Appearance: She is obese  HENT:      Head: Normocephalic and atraumatic  Right Ear: External ear normal       Left Ear: External ear normal       Nose: No congestion  Mouth/Throat:      Mouth: Mucous membranes are moist       Pharynx: Oropharynx is clear  Eyes:      General: No scleral icterus  Right eye: No discharge  Left eye: No discharge  Conjunctiva/sclera: Conjunctivae normal    Cardiovascular:      Rate and Rhythm: Normal rate and regular rhythm  Pulses: Normal pulses  Heart sounds: Normal heart sounds  Pulmonary:      Effort: Pulmonary effort is normal  No respiratory distress  Breath sounds: Normal breath sounds  Abdominal:      General: Abdomen is flat  Bowel sounds are normal  There is no distension  Palpations: Abdomen is soft  There is no mass  Tenderness: There is abdominal tenderness  There is left CVA tenderness  There is no right CVA tenderness, guarding or rebound  Hernia: No hernia is present  Musculoskeletal:         General: Normal range of motion  Cervical back: Normal range of motion and neck supple  Right lower leg: No edema  Left lower leg: No edema  Skin:     General: Skin is warm and dry  Capillary Refill: Capillary refill takes less than 2 seconds  Neurological:      General: No focal deficit present  Mental Status: She is oriented to person, place, and time     Psychiatric:         Mood and Affect: Mood normal  Behavior: Behavior normal          Thought Content: Thought content normal          Judgment: Judgment normal          Lab Results:   I have personally reviewed pertinent reports  CBC:   Lab Results   Component Value Date    WBC 7 46 01/03/2023    HGB 10 8 (L) 01/03/2023    HCT 34 4 (L) 01/03/2023    MCV 90 01/03/2023     01/03/2023    MCH 28 3 01/03/2023    MCHC 31 4 01/03/2023    RDW 16 5 (H) 01/03/2023    MPV 9 3 01/03/2023    NRBC 0 01/03/2023     CMP:   Lab Results   Component Value Date    SODIUM 135 01/03/2023     01/03/2023    CO2 19 (L) 01/03/2023    BUN 13 01/03/2023    CREATININE 1 36 (H) 01/03/2023    CALCIUM 7 7 (L) 01/03/2023     (H) 01/03/2023     (H) 01/03/2023    ALKPHOS 162 (H) 01/03/2023    EGFR 49 01/03/2023     Urinalysis:   Lab Results   Component Value Date    COLORU Yellow 01/02/2023    CLARITYU Slightly Cloudy 01/02/2023    SPECGRAV >=1 030 01/02/2023    PHUR 6 0 01/02/2023    LEUKOCYTESUR Negative 01/02/2023    NITRITE Negative 01/02/2023    GLUCOSEU Negative 01/02/2023    KETONESU Negative 01/02/2023    BILIRUBINUR Negative 01/02/2023    BLOODU Moderate (A) 01/02/2023     Imaging Studies: I have personally reviewed pertinent reports  US abdomen complet 1/3/23  KIDNEYs:   Right kidney measures 10 9 x 3 9 x 4 7  cm  Volume 106 2 mL   Kidney within normal limits  Left kidney measures 11 3 x 6 3 x 5 1 cm  Volume 190 8 mL   Mild left hydronephrosis and hydroureter is again present  Impression:       Mild left hydronephrosis and hydroureter similar to previous CT  CT renal stone study wo contrast 1/2/23  URINARY TRACT FINDINGS:   RIGHT KIDNEY AND URETER: There are nonobstructing intrarenal calculi measuring on the order of 3 mm or less   No ureteral stone   No hydronephrosis or hydroureter     LEFT KIDNEY AND URETER: [place] Anger are several sub-3 mm nonobstructing left renal calculi   There is mild left hydronephrosis and hydroureter down to a 3 mm in maximal diameter ureteral calculus (series 301, image 115) at the level of the coccyx   approximately 5 cm from the ureterovesicular junction   There is an additional 3 mm left ureteral calculus at the ureterovesicular junction  URINARY BLADDER:  Poorly distended and not well evaluated grossly unremarkable  Impression:       Mild left hydronephrosis and hydroureter secondary to 2 distal ureteral calculi each measuring approximately 3 mm in diameter with one at the ureterovesicular junction and the 2nd approximately 5 cm proximal to this  Additional tiny bilateral nonobstructing renal calculi  EKG, Pathology, and Other Studies: I have personally reviewed pertinent reports  VTE Prophylaxis: Heparin    Code Status: Level 1 - Full Code    Counseling / Coordination of Care  Total floor / unit time spent today 40 minutes  Greater than 50% of total time was spent with the patient and / or family counseling and / or coordination of care  A description of the counseling / coordination of care: review of labs and imaging, obtaining history, performing exam, discussion of treatment plan      Radha Heath

## 2023-01-03 NOTE — PROGRESS NOTES
Dr Elvie henderson texted patients pain level is at a #9 after receiving 5 mg of oxy IR at 0525 and prn morphine at [phone]  Patient requesting additional pain medication earlier then currently ordered

## 2023-01-03 NOTE — ANESTHESIA POSTPROCEDURE EVALUATION
Post-Op Assessment Note    CV Status:  Stable    Pain management: adequate     Mental Status:  Sleepy   Hydration Status:  Euvolemic   PONV Controlled:  Controlled   Airway Patency:  Patent      Post Op Vitals Reviewed: Yes      Staff: CRNA, Anesthesiologist         No notable events documented      BP   95/50   Temp      Pulse  91   Resp   14   SpO2   100

## 2023-01-03 NOTE — ASSESSMENT & PLAN NOTE
· Reporting severe abdominal pain and nausea/vomiting, although patient has left kidney stone as well  Patient has history of pancreatitis in the past  Hx cholecystectomy  No alcohol use     · Lipase 1,099  · CT without evidence of pancreatitis   · Continue aggressive IVF hydration   · NPO for now   · PRN pain control   · Repeat lipase in AM

## 2023-01-03 NOTE — H&P
H&P Exam - Urology   Yenifer Tobin 44 y o  female MRN: [id number]  Unit/Bed#: OR POOL Encounter: [id number]    Assessment/Plan     Assessment:  42-year-old woman with uncontrolled flank pain and nausea and vomiting  She has 2 distal stones within the left ureter  She was counseled on the pre-, nina-, postoperative care for ureteroscopy with lithotripsy and stone removal with ureteral stent placement and retrograde pyelography  She is aware of the risks of surgery which include the chance for a staged procedure with stent placement and a subsequent ureteroscopy as well as the risks of infection, bleeding, pain, damage to surrounding structures, need for additional procedures, risk of failure of the procedure, risk of anesthesia, risk of positioning complications including neurapraxia, chronic pain, and paralysis as well as risk of rhabdomyolysis and compartment syndrome  Risk of deep venous thrombosis and venous thromboembolism as well as pneumonia and other potential unpredictable complications were also discussed with the patient  She has been marked on her left arm  Plan:  Procedure    With triptan on the date of procedures    History of Present Illness   HPI:  Yenifer Tobin is a 44 y o  female who presents with uncontrolled flank pain  This has been present for a number of days  No other associated symptoms apart from some nausea and vomiting  No aggravating factors, no relieving factors  No family history of stones  She has had some kidney stones previously  She does have a history of gastric bypass with a restrictive and bypass procedure and has lost 200 pounds  The following portions of the patient's history were reviewed and updated as appropriate: allergies, current medications, past family history, past medical history, past social history, past surgical history and problem list     Review of Systems   Constitutional: Negative  HENT: Negative  Eyes: Negative      Respiratory: Negative  Cardiovascular: Negative  Gastrointestinal: Negative  Endocrine: Negative  Genitourinary: Positive for flank pain and hematuria  Skin: Negative  Allergic/Immunologic: Negative  Neurological: Negative  Hematological: Negative  Psychiatric/Behavioral: Negative  Historical Information   Past Medical History:   Diagnosis Date   • C  difficile colitis    • COVID-19     2022- pt denies long covid   • DUB (dysfunctional uterine bleeding)    • H  pylori infection    • Hypertension    • Hypokalemia    • Left lower quadrant abdominal pain 2020   • Migraine    • Psychiatric disorder     Anxiety   • Rectal bleeding    • Renal disorder    • Rhabdomyolysis    • Thrombosed external hemorrhoid      Past Surgical History:   Procedure Laterality Date   • ABDOMINAL SURGERY     • APPENDECTOMY     •  SECTION     • CHOLECYSTECTOMY     • COSMETIC SURGERY      "tummy tuck"   • FL GUIDED CENTRAL VENOUS ACCESS DEVICE INSERTION  2018   • GASTRIC BYPASS     • HYSTERECTOMY     • LAPAROSCOPY     • VT ANRCT XM SURG REQ ANES GENERAL SPI/EDRL DX N/A 2021    Procedure: ANAL EXAM UNDER ANESTHESIA; HEMORRHOIDECTOMY;  Surgeon: Corbin Mcconnell DO;  Location:  MAIN OR;  Service: General   • VT EXC THROMBOSED HEMORRHOID Jenean Failing N/A 2022    Procedure: EXCISION OF THROMBOSED EXTERNAL HEMORRHOID, Excision of perianal skin tag, dranage of perianal abscess, anal fistulatomy;  Surgeon: Corbin Mcconnell DO;  Location: [facility];  Service: General   • TUNNELED VENOUS PORT PLACEMENT N/A 2018    Procedure: INSERTION VENOUS PORT (PORT-A-CATH);   Surgeon: Geovanni Gonzalez DO;  Location: [facility];  Service: General     Social History   Social History     Substance and Sexual Activity   Alcohol Use Never   • Alcohol/week: 0 0 standard drinks    Comment: 0     Social History     Substance and Sexual Activity   Drug Use Never     Social History     Tobacco Use   Smoking Status Never   Smokeless Tobacco Never     E-Cigarette/Vaping   • E-Cigarette Use Never User      E-Cigarette/Vaping Substances     Family History:   Family History   Problem Relation Age of Onset   • No Known Problems Mother    • Hypertension Father    • Diabetes Father    • Diabetes Maternal Grandfather        Meds/Allergies   all medications and allergies reviewed  Allergies   Allergen Reactions   • Nsaids Other (See Comments)     Other reaction(s): Other (Please comment)  Should not take s/p bariatric surgery  Other reaction(s): Other (See Comments)  Should not take as per prior records  Should not take s/p bariatric surgery  Has hx of gastric bypass     • Prednisone      Nausea, vomiting, diarrhea       Objective   Vitals: There were no vitals taken for this visit  No intake/output data recorded  Invasive Devices     Central Venous Catheter Line  Duration           Port A Cath Right Chest -- days                Physical Exam  Vitals reviewed  Constitutional:       General: She is not in acute distress  Appearance: Normal appearance  She is ill-appearing  She is not toxic-appearing or diaphoretic  HENT:      Head: Normocephalic and atraumatic  Eyes:      General: No scleral icterus  Right eye: No discharge  Left eye: No discharge  Cardiovascular:      Pulses: Normal pulses  Pulmonary:      Effort: Pulmonary effort is normal    Abdominal:      General: There is no distension  Palpations: There is no mass  Genitourinary:     Comments: Deferred for the OR  Musculoskeletal:         General: No swelling  Skin:     General: Skin is warm  Neurological:      General: No focal deficit present  Mental Status: She is alert and oriented to person, place, and time  Cranial Nerves: No cranial nerve deficit  Psychiatric:         Mood and Affect: Mood normal          Behavior: Behavior normal          Thought Content:  Thought content normal          Judgment: Judgment normal          Lab Results: I have personally reviewed pertinent reports  Imaging: I have personally reviewed pertinent reports  EKG, Pathology, and Other Studies: I have personally reviewed pertinent reports  VTE Prophylaxis: Sequential compression device Dolores Sanderson)     Code Status: Prior  Advance Directive and Living Will:      Power of :    POLST:      Counseling / Coordination of Care  Total floor / unit time spent today 15 minutes  Greater than 50% of total time was spent with the patient and / or family counseling and / or coordination of care  A description of the counseling / coordination of care: review of today's case

## 2023-01-03 NOTE — PROGRESS NOTES
Patient tolerated medication whole with small sips of water  Patient is NPO with sips of water with meds

## 2023-01-03 NOTE — DISCHARGE SUMMARY
[address]  Discharge- [facility] 1985, 44 y o  female MRN: [id number]  Unit/Bed#: -02 Encounter: [id number]  Primary Care Provider: Haley Barclay MD   Date and time admitted to hospital: 1/2/2023  8:08 PM    * Left ureteral calculus  Assessment & Plan  · Presents with left lower back/flank pain radiating to anterior abdomen over the last several days  Hx renal stones in the past- imaging in December 2022 showing right sided stone  · CT a/p renal stone study : " Mild left hydronephrosis and hydroureter secondary to 2 distal ureteral calculi each measuring approximately 3 mm in diameter with one at the UV junction and the second 5 cm proximal to this  Additional bilateral nonobstructing calculi  "  · UA showing blood otherwise unremarkable  · ED provider discussed with urology on call , Recommended starting Flomax and as patient is still having abdominal pain will transfer to [facility] directly to the OR  · Keep NPO  Continue Flomax  · IVF hydration   · PRN pain control - patient having significant pain     Other intestinal malabsorption  Assessment & Plan  · Secondary to history of gastric bypass  Patient reports she gets outpatient potassium infusions through her port 80 mEq potassium daily  This has been her regimen for very long time     · K 3 8 on admission  We will give her 40 mEq of IV potassium now and recheck BMP at 2 PM and give her further potassium based on level  · Continue to monitor electrolytes and replace PRN    Transaminitis  Assessment & Plan  To have significant transaminitis today  Tylenol level negative  Acute hepatitis panel pending  No gallbladder pathology noted on CT abdomen pelvis she has absent gallbladder  Repeat CMP tomorrow avoid hepatotoxic agents or hypotension    Acute pancreatitis  Assessment & Plan  · Reporting severe abdominal pain and nausea/vomiting, although patient has left kidney stone as well   Patient has history of pancreatitis in the past  Hx cholecystectomy  No alcohol use  · Lipase 1,099  · CT without evidence of pancreatitis   · Continue aggressive IVF hydration   · NPO for now   · PRN pain control   · Repeat lipase in AM    Chronic anemia  Assessment & Plan  · Baseline Hgb 9-11, currently stable at baseline   · Secondary to gastric bypass  · Continue supplements  · Trend CBC      Discharging Physician / Practitioner: Shana Hawkins MD  PCP: Leonardo Barnes MD  Admission Date:   Admission Orders (From admission, onward)     Ordered        01/03/23 1001  Inpatient Admission  Once            01/02/23 2135  Place in Observation  Once                      Discharge Date: 01/03/23    Medical Problems     Resolved Problems  Date Reviewed: 1/3/2023   None         Consultations During Hospital Stay:  · Urology  · gi    Procedures Performed:   · none    Significant Findings / Test Results:   US abdomen complete    Result Date: 1/3/2023  Impression: Mild left hydronephrosis and hydroureter similar to previous CT  Workstation performed: [id number]     CT renal stone study abdomen pelvis wo contrast    Result Date: 1/2/2023  Impression: Mild left hydronephrosis and hydroureter secondary to 2 distal ureteral calculi each measuring approximately 3 mm in diameter with one at the ureterovesicular junction and the 2nd approximately 5 cm proximal to this  Additional tiny bilateral nonobstructing renal calculi  Findings marked for immediate notification in Epic  Workstation performed: [id number]     Incidental Findings:   · none    Test Results Pending at Discharge (will require follow up):   · none     Outpatient Tests Requested:  · none    Complications:  none    Reason for Admission: Left flank pain    Hospital Course: Azalea Wan is a 44 y o  female patient who originally presented to the hospital on 1/2/2023 due to sided flank pain that started last 2 days    Patient has known history of kidney stones in the past   She has a known history of having severe hypokalemia and requires 80 mEq of IV potassium every day at home which she infuses via her port  Received IV fluid hydration Flomax however still having considerable left flank pain and urology would like her to be transferred to [facility] to the OR  Continue n p o  IV fluids for now  Please note that I have given the patient 40 mEq of IV potassium today we will recheck her BMP at 2 PM and give her further potassium based on levels  Noted to have significant transaminitis today with unclear etiology pending further work-up  Noted to have acute pancreatitis with elevated lipase  Repeat lipase level in the morning  No signs of pancreatitis on CT imaging        Please see above list of diagnoses and related plan for additional information  Condition at Discharge: fair     Discharge Day Visit / Exam:     Subjective: Complaining of 6 x 10 left-sided flank pain with no improvement so far with pain medications or IV fluids  Vitals: Blood Pressure: 111/63 (01/03/23 0700)  Pulse: 72 (01/03/23 0700)  Temperature: 97 5 °F (36 4 °C) (01/03/23 0700)  Temp Source: Oral (01/03/23 0700)  Respirations: 20 (01/03/23 0700)  Height: 5' 2" (157 5 cm) (01/02/23 2006)  Weight - Scale: 53 5 kg (118 lb) (01/02/23 2006)  SpO2: 95 % (01/03/23 0700)  Exam:   Physical Exam  Vitals and nursing note reviewed  HENT:      Head: Normocephalic and atraumatic  Right Ear: External ear normal       Left Ear: External ear normal       Nose: Nose normal       Mouth/Throat:      Pharynx: Oropharynx is clear  Eyes:      Pupils: Pupils are equal, round, and reactive to light  Cardiovascular:      Rate and Rhythm: Normal rate and regular rhythm  Heart sounds: Normal heart sounds  Pulmonary:      Effort: Pulmonary effort is normal       Breath sounds: Normal breath sounds  Abdominal:      General: Bowel sounds are normal       Palpations: Abdomen is soft  Tenderness:  There is abdominal tenderness  There is left CVA tenderness  Musculoskeletal:         General: Normal range of motion  Cervical back: Normal range of motion and neck supple  Skin:     General: Skin is warm and dry  Capillary Refill: Capillary refill takes less than 2 seconds  Neurological:      General: No focal deficit present  Mental Status: She is alert and oriented to person, place, and time  Psychiatric:         Mood and Affect: Mood normal          Discussion with Family: Update family    Discharge instructions/Information to patient and family:   See after visit summary for information provided to patient and family  Provisions for Follow-Up Care:  See after visit summary for information related to follow-up care and any pertinent home health orders  Disposition:     [address] [facility]  [address] Transfer to [facility] to [facility]:   · Not Applicable to this Patient - Not Applicable to this Patient    Planned Readmission: None     Discharge Statement:  I spent 35 minutes discharging the patient  This time was spent on the day of discharge  I had direct contact with the patient on the day of discharge  Greater than 50% of the total time was spent examining patient, answering all patient questions, arranging and discussing plan of care with patient as well as directly providing post-discharge instructions  Additional time then spent on discharge activities  Discharge Medications:  See after visit summary for reconciled discharge medications provided to patient and family        ** Please Note: This note has been constructed using a voice recognition system **

## 2023-01-03 NOTE — TRANSPORTATION MEDICAL NECESSITY
Section I - General Information    Name of Patient: Ady Dempsey                 : 1985    Medicare #: [id number]  Transport Date: 23 (PCS is valid for round trips on this date and for all repetitive trips in the 60-day range as noted below )  Origin: [address]: Malika Husbands  Is the pt's stay covered under Medicare Part A (PPS/DRG)   []     Closest appropriate facility? If no, why is transport to more distant facility required? Yes  If hospice pt, is this transport related to pt's terminal illness? NA       Section II - Medical Necessity Questionnaire  Ambulance transportation is medically necessary only if other means of transport are contraindicated or would be potentially harmful to the patient  To meet this requirement, the patient must either be "bed confined" or suffer from a condition such that transport by means other than ambulance is contraindicated by the patient's condition  The following questions must be answered by the medical professional signing below for this form to be valid:    1)  Describe the MEDICAL CONDITION (physical and/or mental) of this patient AT [address] [place] [address] that requires the patient to be transported in an ambulance and why transport by other means is contraindicated by the patient's condition:pt being transferred to a higher level of care    2) Is the patient "bed confined" as defined below? Yes  To be "be confined" the patient must satisfy all three of the following conditions: (1) unable to get up from bed without Assistance; AND (2) unable to ambulate; AND (3) unable to sit in a chair or wheelchair  3) Can this patient safely be transported by car or wheelchair van (i e , seated during transport without a medical attendant or monitoring)?    No    4) In addition to completing questions 1-3 above, please check any of the following conditions that apply*:   *Note: supporting documentation for any boxes checked must be maintained in the patient's medical records  If hosp-hosp transfer, describe services needed at 2nd facility not available at 1st facility? Other(specify) [t being transfered to a higher level of care      Section III - Signature of Physician or Healthcare Professional  I certify that the above information is true and correct based on my evaluation of this patient, and represent that the patient requires transport by ambulance and that other forms of transport are contraindicated  I understand that this information will be used by the Centers for Medicare and Medicaid Services (CMS) to support the determination of medical necessity for ambulance services, and I represent that I have personal knowledge of the patient's condition at time of transport  []  If this box is checked, I also certify that the patient is physically or mentally incapable of signing the ambulance service's claim and that the institution with which I am affiliated has furnished care, services, or assistance to the patient  My signature below is made on behalf of the patient pursuant to 42 CFR §424 36(b)(4)  In accordance with 42 CFR §424 37, the specific reason(s) that the patient is physically or mentally incapable of signing the claim form is as follows: amari thompson  Signature of Physician* or Healthcare Professional______________________________________________________________  Signature Date 01/03/23 (For scheduled repetitive transports, this form is not valid for transports performed more than 60 days after this date)    Printed Name & Credentials of Physician or Healthcare Professional (MD, DO, RN, etc )________________________________  *Form must be signed by patient's attending physician for scheduled, repetitive transports   For non-repetitive, unscheduled ambulance transports, if unable to obtain the signature of the attending physician, any of the following may sign (choose appropriate option below)  [] Physician Assistant []  Clinical Nurse Specialist [x]  Registered Nurse  []  Nurse Practitioner  [] Discharge Planner

## 2023-01-03 NOTE — UTILIZATION REVIEW
Initial Clinical Review    Admission: Date/Time/Statement:   Admission Orders (From admission, onward)     Ordered        01/02/23 2135  Place in Observation  Once                      Orders Placed This Encounter   Procedures   • Place in Observation     Standing Status:   Standing     Number of Occurrences:   1     Order Specific Question:   Level of Care     Answer:   Med Surg [16]     ED Arrival Information     Expected   -    Arrival   1/2/2023 20:00    Acuity   Urgent            Means of arrival   Walk-In    Escorted by   Spouse    Service   Hospitalist    Admission type   Emergency            Arrival complaint   flank pain           Chief Complaint   Patient presents with   • Flank Pain     Pt reports being dx with kidney stones by nephrologist approx 3 weeks ago and told to try to pass them, however pt is having increased left sided flank pain since 1500 today  Initial Presentation: 44 y o  female presents to ED as a walk-in admitted under observation with L ureteral calculus and acute pancreatitis  Presents with left lower back/flank pain radiating to anterior abdomen over the last several days  Hx renal stones in the past- imaging in December 2022 showing right sided stone  CT a/p renal stone study : " Mild left hydronephrosis and hydroureter secondary to 2 distal ureteral calculi each measuring approximately 3 mm in diameter with one at the UV junction and the second 5 cm proximal to this  Additional bilateral nonobstructing calculi  " UA showing blood otherwise unremarkable  Lipase 1,099  Plan: start Flomax, IVFs  Npo  Prn analgesics  Consult urology  Repeat lipase in AM  WBC 13 33  Hold on abx at this time  Continue to monitor   K 3 8, monitor BMP in AM      Date: 1/3   Day 2:     ED Triage Vitals [01/02/23 2006]   Temperature Pulse Respirations Blood Pressure SpO2   97 7 °F (36 5 °C) 86 20 141/67 99 %      Temp Source Heart Rate Source Patient Position - Orthostatic VS BP Location FiO2 (%) Temporal Monitor Sitting Left arm --      Pain Score       10 - Worst Possible Pain          Wt Readings from Last 1 Encounters:   01/02/23 53 5 kg (118 lb)     Additional Vital Signs:   Date/Time Temp Pulse Resp BP MAP (mmHg) SpO2 O2 Device Patient Position - Orthostatic VS   01/03/23 0700 97 5 °F (36 4 °C) 72 20 111/63 79 95 % -- Lying   01/02/23 2300 97 9 °F (36 6 °C) 85 20 125/85 95 100 % -- Lying   01/02/23 2242 -- 76 16 128/88 -- 98 % None (Room air) Lying   01/02/23 2006 97 7 °F (36 5 °C) 86 20 141/67 -- 99 % None (Room air) Sitting     Pertinent Labs/Diagnostic Test Results:   CT renal stone study abdomen pelvis wo contrast   Final Result by Richardine Fabry, MD (01/02 2118)      Mild left hydronephrosis and hydroureter secondary to 2 distal ureteral calculi each measuring approximately 3 mm in diameter with one at the ureterovesicular junction and the 2nd approximately 5 cm proximal to this  Additional tiny bilateral nonobstructing renal calculi           US abdomen complete    (Results Pending)     Results from last 7 days   Lab Units 01/02/23 2156   SARS-COV-2  Negative     Results from last 7 days   Lab Units 01/03/23  0504 01/02/23 2021   WBC Thousand/uL 7 46 13 33*   HEMOGLOBIN g/dL 10 8* 11 4*   HEMATOCRIT % 34 4* 36 7   PLATELETS Thousands/uL 340 430*   NEUTROS ABS Thousands/µL 5 50 9 36*     Results from last 7 days   Lab Units 01/03/23  0504 01/02/23 2021 01/02/23  0820   SODIUM mmol/L 135 139 138   POTASSIUM mmol/L 3 8 3 8 3 7   CHLORIDE mmol/L 105 106 106   CO2 mmol/L 19* 23 25   ANION GAP mmol/L 11 10 7   BUN mg/dL 13 13 15   CREATININE mg/dL 1 36* 1 20 1 00   EGFR ml/min/1 73sq m 49 57 71   CALCIUM mg/dL 7 7* 7 8* 7 9*     Results from last 7 days   Lab Units 01/03/23  0504 01/02/23 2021   AST U/L 943* 43   ALT U/L 284* 33   ALK PHOS U/L 162* 75   TOTAL PROTEIN g/dL 5 7* 6 8   ALBUMIN g/dL 2 4* 3 0*   TOTAL BILIRUBIN mg/dL 0 39 0 27     Results from last 7 days   Lab Units 01/03/23  0504 01/02/23 2021 01/02/23  0820   GLUCOSE RANDOM mg/dL 96 102 78     BETA-HYDROXYBUTYRATE   Date Value Ref Range Status   09/02/2022 0 4 <0 6 mmol/L Final      Results from last 7 days   Lab Units 01/02/23 2021   LACTIC ACID mmol/L 0 8     Results from last 7 days   Lab Units 01/03/23  0504 01/02/23 2021   LIPASE u/L 209 1,099*   AMYLASE IU/L 63  --      Results from last 7 days   Lab Units 01/02/23 2103   CLARITY UA  Slightly Cloudy   COLOR UA  Yellow   SPEC GRAV UA  >=1 030   PH UA  6 0   GLUCOSE UA mg/dl Negative   KETONES UA mg/dl Negative   BLOOD UA  Moderate*   PROTEIN UA mg/dl 30 (1+)*   NITRITE UA  Negative   BILIRUBIN UA  Negative   UROBILINOGEN UA E U /dl 0 2   LEUKOCYTES UA  Negative   WBC UA /hpf 0-1   RBC UA /hpf 4-10*   BACTERIA UA /hpf None Seen   EPITHELIAL CELLS WET PREP /hpf Occasional     Results from last 7 days   Lab Units 01/02/23 2156   INFLUENZA A PCR  Negative   INFLUENZA B PCR  Negative   RSV PCR  Negative     Results from last 7 days   Lab Units 01/03/23  0504   ACETAMINOPHEN LVL ug/mL <2 0*       ED Treatment:   Medication Administration from 01/02/2023 1959 to 01/02/2023 2255       Date/Time Order Dose Route Action     01/02/2023 2024 EST sodium chloride 0 9 % bolus 1,000 mL 1,000 mL Intravenous New Bag     01/02/2023 2025 EST morphine injection 4 mg 4 mg Intravenous Given     01/02/2023 2025 EST ondansetron (ZOFRAN) injection 4 mg 4 mg Intravenous Given     01/02/2023 2109 EST morphine injection 4 mg 4 mg Intravenous Given     01/02/2023 2147 EST morphine injection 2 mg 2 mg Intravenous Given     01/02/2023 2225 EST amitriptyline (ELAVIL) tablet 20 mg 20 mg Oral Given     01/02/2023 2225 EST escitalopram (LEXAPRO) tablet 20 mg 20 mg Oral Given     01/02/2023 2225 EST tamsulosin (FLOMAX) capsule 0 4 mg 0 4 mg Oral Given     01/02/2023 2229 EST multi-electrolyte (PLASMALYTE-A/ISOLYTE-S PH 7 4) IV solution 125 mL/hr Intravenous New Bag     01/02/2023 2229 EST HYDROmorphone (DILAUDID) injection 1 mg 1 mg Intravenous Given     Past Medical History:   Diagnosis Date   • C  difficile colitis    • COVID-19     1/2022- pt denies long covid   • DUB (dysfunctional uterine bleeding)    • H  pylori infection    • Hypertension    • Hypokalemia    • Left lower quadrant abdominal pain 08/17/2020   • Migraine    • Psychiatric disorder     Anxiety   • Rectal bleeding    • Renal disorder    • Rhabdomyolysis    • Thrombosed external hemorrhoid      Present on Admission:  • Other intestinal malabsorption  • Acute pancreatitis  • Chronic anemia      Admitting Diagnosis: Flank pain [R10 9]  Left ureteral stone [N20 1]  Acute pancreatitis without infection or necrosis, unspecified pancreatitis type [K85 90]  Age/Sex: 44 y o  female  Admission Orders:  Scheduled Medications:  escitalopram, 20 mg, Oral, HS  ferrous sulfate, 325 mg, Oral, Every Other Day  heparin (porcine), 5,000 Units, Subcutaneous, Q8H [address]  tamsulosin, 0 4 mg, Oral, Daily  Vitamin B Complex, 1 tablet, Oral, Daily    Continuous IV Infusions:  multi-electrolyte, 125 mL/hr, Intravenous, Continuous    PRN Meds:  HYDROmorphone, 1 mg, Intravenous, Q4H PRN  ondansetron, 4 mg, Intravenous, Q6H PRN  oxyCODONE, 10 mg, Oral, Q6H PRN  oxyCODONE, 5 mg, Oral, Q4H PRN          Network Utilization Review Department  ATTENTION: Please call with any questions or concerns to [phone] and carefully listen to the prompts so that you are directed to the right person  All voicemails are confidential   Grecia Bauer all requests for admission clinical reviews, approved or denied determinations and any other requests to dedicated fax number below belonging to the campus where the patient is receiving treatment   List of dedicated fax numbers for the Facilities:  [address] DENIALS (Administrative/Medical Necessity) [phone]   [address] (Maternity/NICU/Pediatrics) Angela Talavera 172 [address] Marie Anderson 77 [phone]   [address] [place] [address] Jeovanny Tian 28 [phone]   [address] [facility] Marge Sol Acosta 134 815 [facility] [phone]

## 2023-01-03 NOTE — ASSESSMENT & PLAN NOTE
· Presents with left lower back/flank pain radiating to anterior abdomen over the last several days  Hx renal stones in the past- imaging in December 2022 showing right sided stone  · CT a/p renal stone study : " Mild left hydronephrosis and hydroureter secondary to 2 distal ureteral calculi each measuring approximately 3 mm in diameter with one at the UV junction and the second 5 cm proximal to this  Additional bilateral nonobstructing calculi  "  · UA showing blood otherwise unremarkable  · ED provider discussed with urology on call , Recommended starting Flomax and as patient is still having abdominal pain will transfer to [facility] [place] directly to the OR  · Keep NPO    Continue Flomax  · IVF hydration   · PRN pain control - patient having significant pain

## 2023-01-03 NOTE — ASSESSMENT & PLAN NOTE
· Secondary to history of gastric bypass  Patient reports she gets outpatient potassium infusions through her port 80 mEq potassium daily  This has been her regimen for very long time     · K 3 8 on admission  We will give her 40 mEq of IV potassium now and recheck BMP at 2 PM and give her further potassium based on level    · Continue to monitor electrolytes and replace PRN

## 2023-01-03 NOTE — DISCHARGE INSTR - AVS FIRST PAGE
Kishan Diop,    Today you had cystoscopy with left ureteroscopy with stone basketing  We were able to remove your obstructing stones and sent them for specimen  You do have a stent in place, a small plastic tube to help drain the kidney  This is attached to a black thread which is taped to the front of the vaginal tissue  Please remove the clear dressing and pull on the black thread to remove your stent in 5 days  We will see you back in the office in 3 months with imaging prior  Urgency frequency of urination along with some blood in the urine may occur after surgery like this      I hope that you feel better soon,  Dr Lor Mario

## 2023-01-03 NOTE — ANESTHESIA PREPROCEDURE EVALUATION
Procedure:  CYSTOSCOPY URETEROSCOPY WITH LITHOTRIPSY HOLMIUM LASER, RETROGRADE PYELOGRAM AND INSERTION STENT URETERAL (Left: Bladder)    Relevant Problems   CARDIO   (+) Chest pain   (+) Migraine   (+) Migraine without aura and without status migrainosus, not intractable      GI/HEPATIC   (+) Acute pancreatitis   (+) Chronic pancreatitis (HCC)   (+) GERD (gastroesophageal reflux disease)      HEMATOLOGY   (+) Chronic anemia      NEURO/PSYCH   (+) History of anxiety   (+) Migraine   (+) Migraine without aura and without status migrainosus, not intractable   (+) Paresthesia of both lower extremities   (+) Paresthesias   (+) Psychogenic nonepileptic spells      PULMONARY   (+) WAGNER (obstructive sleep apnea)      Other   (+) Cervical lymphadenopathy        Physical Exam    Airway    Mallampati score: II  TM Distance: >3 FB  Neck ROM: full     Dental   No notable dental hx     Cardiovascular  Cardiovascular exam normal    Pulmonary  Pulmonary exam normal     Other Findings        Anesthesia Plan  ASA Score- 3     Anesthesia Type- general with ASA Monitors  Additional Monitors:   Airway Plan: LMA  Plan Factors-Exercise tolerance (METS): >4 METS  Chart reviewed  EKG reviewed  Imaging results reviewed  Existing labs reviewed  Patient summary reviewed  Patient is not a current smoker  Patient not instructed to abstain from smoking on day of procedure  Patient did not smoke on day of surgery  Induction- intravenous  Postoperative Plan-     Informed Consent- Anesthetic plan and risks discussed with patient  I personally reviewed this patient with the CRNA  Discussed and agreed on the Anesthesia Plan with the CRNA  Lawrence [place]

## 2023-01-03 NOTE — EMTALA/ACUTE CARE TRANSFER
[address] [facility] [address]IT  [address] [facility] [address]  Dept: [phone]      ACUTE CARE TRANSFER CONSENT    NAME Corbin Jackson                                         1985                              MRN [id number]    I have been informed of my rights regarding examination, treatment, and transfer   by Dr Nena Mota MD    Benefits:  Kidney stone needs stent    Risks:  Worsening renal function and abdominal pain      Transfer Request:  I acknowledge that my medical condition has been evaluated and explained to me by the treating physician or other qualified medical person and/or my attending physician who has recommended and offered to me further medical examination and treatment  I understand the Hospital's obligation with respect to the treatment and stabilization of my medical condition  I nevertheless request to be transferred  I release the Hospital, the doctor, and any other persons caring for me from all responsibility or liability for any injury or ill effects that may result from my transfer and agree to accept all responsibility for the consequences of my choice to transfer, rather than receive stabilizing treatment at the Hospital  I understand that because the transfer is my request, my insurance may not provide reimbursement for the services  The Hospital will assist and direct me and my family in how to make arrangements for transfer, but the hospital is not liable for any fees charged by the transport service  In spite of this understanding, I refuse to consent to further medical examination and treatment which has been offered to me, and request transfer to    I authorize the performance of emergency medical procedures and treatments upon me in both transit and upon arrival at the receiving facility  Additionally, I authorize the release of any and all medical records to the receiving facility and request they be transported with me, if possible      I authorize the performance of emergency medical procedures and treatments upon me in both transit and upon arrival at the receiving facility  Additionally, I authorize the release of any and all medical records to the receiving facility and request they be transported with me, if possible  I understand that the safest mode of transportation during a medical emergency is an ambulance and that the Hospital advocates the use of this mode of transport  Risks of traveling to the receiving facility by car, including absence of medical control, life sustaining equipment, such as oxygen, and medical personnel has been explained to me and I fully understand them  (CRESCENCIO CORRECT BOX BELOW)  [  ]  I consent to the stated transfer and to be transported by ambulance/helicopter  [  ]  I consent to the stated transfer, but refuse transportation by ambulance and accept full responsibility for my transportation by car  I understand the risks of non-ambulance transfers and I exonerate the Hospital and its staff from any deterioration in my condition that results from this refusal     X___________________________________________    DATE  23  TIME________  Signature of patient or legally responsible individual signing on patient behalf           RELATIONSHIP TO PATIENT_________________________          Provider Certification    NAME Henry Muniz                                        [facility] 1985                              MRN [id number]    A medical screening exam was performed on the above named patient    Based on the examination:    Condition Necessitating Transfer kidney stone needs stent    Patient Condition:  stAble    Reason for Transfer:  Needs urology intervention    Transfer Requirements: Facility     · Space available and qualified personnel available for treatment as acknowledged by    · Agreed to accept transfer and to provide appropriate medical treatment as acknowledged by          · Appropriate medical records of the examination and treatment of the patient are provided at the time of transfer   [address] [facility], [address] _______  · Transfer will be performed by qualified personnel from    and appropriate transfer equipment as required, including the use of necessary and appropriate life support measures  Provider Certification: I have examined the patient and explained the following risks and benefits of being transferred/refusing transfer to the patient/family:         Based on these reasonable risks and benefits to the patient and/or the unborn child(rick), and based upon the information available at the time of the patient’s examination, I certify that the medical benefits reasonably to be expected from the provision of appropriate medical treatments at another medical facility outweigh the increasing risks, if any, to the individual’s medical condition, and in the case of labor to the unborn child, from effecting the transfer      X____________________________________________ DATE 01/03/23        TIME_______      ORIGINAL - SEND TO MEDICAL RECORDS   COPY - SEND WITH PATIENT DURING TRANSFER

## 2023-01-03 NOTE — PROGRESS NOTES
Patients transportation here and patient had 10 of 10 pain in lower back radiating to abdomen  Per Dr Rivas Gill patient was to be given dilaudid prior to transportation even though last dose of dilaudid was given at 1146  Patient left with all belongings and patient was on a stretcher with ambulance transportation to [facility] [place]  Yes

## 2023-01-03 NOTE — TELEPHONE ENCOUNTER
Status post ureteroscopy with stone basketing  She has a 6 [employer] [place] by 24 cm left ureteral stent on a string  Please have her remove this in 5 days    Please have her follow-up with a renal ultrasound in 3 months, ordered

## 2023-01-03 NOTE — PLAN OF CARE

## 2023-01-04 ENCOUNTER — TELEPHONE (OUTPATIENT)
Dept: NEPHROLOGY | Facility: CLINIC | Age: 38
End: 2023-01-04

## 2023-01-04 NOTE — TELEPHONE ENCOUNTER
Call from [facility] at [address] [place] [address] patient was in the hospital for stents for kidney stones and d/c  She will need orders to resume care  She will be seeing her to change port        Fax# [phone]

## 2023-01-04 NOTE — TELEPHONE ENCOUNTER
Called and spoke to patient  Reviewed stent removal with patient for Sunday 1/8/23  Patient instructed on how to remove stent and feels comfortable doing this at home  Informed patient I am not in the office on Monday but will have one of our other nurses call to confirm stent was removed if not I will call on Tuesday to confirm stent removal   Reviewed what patient may expect with stent in place until stent is removed  Patient denies any questions regarding medication that were given  Informed patient she may utilize a heating pad and Tylenol for flank discomfort  Informed patient to avoid constipation and ensure adequate water intake and avoid bladder irritants  Patient scheduled for 3-month follow-up in [place] as this office is closest to her  Patient would like me to send central scheduling phone number through my chart  Patient is to have this done 2 weeks prior to follow-up appointment  Patient thankful for call and verbalized understanding

## 2023-01-08 LAB
AMM URATE MFR STONE: 80 %
CALCIUM OXALATE DIHYDRATE MFR STONE IR: 20 %
COLOR STONE: NORMAL
COMMENT-STONE3: NORMAL
COMPOSITION: NORMAL
LABORATORY COMMENT REPORT: NORMAL
PHOTO: NORMAL
SIZE STONE: NORMAL MM
SPEC SOURCE SUBJ: NORMAL
STONE ANALYSIS-IMP: NORMAL
STONE ANALYSIS-IMP: NORMAL
WT STONE: 6 MG

## 2023-01-09 ENCOUNTER — APPOINTMENT (OUTPATIENT)
Dept: LAB | Facility: HOSPITAL | Age: 38
End: 2023-01-09

## 2023-01-09 DIAGNOSIS — E87.6 HYPOKALEMIA: ICD-10-CM

## 2023-01-09 LAB
ANION GAP SERPL CALCULATED.3IONS-SCNC: 10 MMOL/L (ref 4–13)
BUN SERPL-MCNC: 17 MG/DL (ref 5–25)
CALCIUM SERPL-MCNC: 8.5 MG/DL (ref 8.3–10.1)
CHLORIDE SERPL-SCNC: 105 MMOL/L (ref 96–108)
CO2 SERPL-SCNC: 23 MMOL/L (ref 21–32)
CREAT SERPL-MCNC: 1.01 MG/DL (ref 0.6–1.3)
GFR SERPL CREATININE-BSD FRML MDRD: 70 ML/MIN/1.73SQ M
GLUCOSE SERPL-MCNC: 81 MG/DL (ref 65–140)
POTASSIUM SERPL-SCNC: 3.8 MMOL/L (ref 3.5–5.3)
SODIUM SERPL-SCNC: 138 MMOL/L (ref 135–147)

## 2023-01-09 NOTE — TELEPHONE ENCOUNTER
Telephone call to pt  Inquired if she was able to put the stent out  She stated she was  Inquired if pt was having any blood or cramping which she denied

## 2023-01-16 ENCOUNTER — APPOINTMENT (OUTPATIENT)
Dept: LAB | Facility: HOSPITAL | Age: 38
End: 2023-01-16

## 2023-01-16 DIAGNOSIS — E87.6 HYPOKALEMIA: ICD-10-CM

## 2023-01-16 LAB
ANION GAP SERPL CALCULATED.3IONS-SCNC: 8 MMOL/L (ref 4–13)
BUN SERPL-MCNC: 15 MG/DL (ref 5–25)
CALCIUM SERPL-MCNC: 8.5 MG/DL (ref 8.3–10.1)
CHLORIDE SERPL-SCNC: 106 MMOL/L (ref 96–108)
CO2 SERPL-SCNC: 24 MMOL/L (ref 21–32)
CREAT SERPL-MCNC: 1.08 MG/DL (ref 0.6–1.3)
GFR SERPL CREATININE-BSD FRML MDRD: 65 ML/MIN/1.73SQ M
GLUCOSE SERPL-MCNC: 68 MG/DL (ref 65–140)
POTASSIUM SERPL-SCNC: 3.6 MMOL/L (ref 3.5–5.3)
SODIUM SERPL-SCNC: 138 MMOL/L (ref 135–147)

## 2023-01-23 ENCOUNTER — APPOINTMENT (OUTPATIENT)
Dept: LAB | Facility: HOSPITAL | Age: 38
End: 2023-01-23

## 2023-01-23 DIAGNOSIS — E87.6 HYPOKALEMIA: ICD-10-CM

## 2023-01-23 LAB
ANION GAP SERPL CALCULATED.3IONS-SCNC: 7 MMOL/L (ref 4–13)
BUN SERPL-MCNC: 12 MG/DL (ref 5–25)
CALCIUM SERPL-MCNC: 7.7 MG/DL (ref 8.4–10.2)
CHLORIDE SERPL-SCNC: 108 MMOL/L (ref 96–108)
CO2 SERPL-SCNC: 19 MMOL/L (ref 21–32)
CREAT SERPL-MCNC: 0.96 MG/DL (ref 0.6–1.3)
GFR SERPL CREATININE-BSD FRML MDRD: 75 ML/MIN/1.73SQ M
GLUCOSE SERPL-MCNC: 154 MG/DL (ref 65–140)
POTASSIUM SERPL-SCNC: 3.3 MMOL/L (ref 3.5–5.3)
SODIUM SERPL-SCNC: 134 MMOL/L (ref 135–147)

## 2023-01-30 ENCOUNTER — APPOINTMENT (OUTPATIENT)
Dept: LAB | Facility: HOSPITAL | Age: 38
End: 2023-01-30

## 2023-01-30 DIAGNOSIS — E87.6 HYPOKALEMIA: ICD-10-CM

## 2023-01-30 LAB
ANION GAP SERPL CALCULATED.3IONS-SCNC: 10 MMOL/L (ref 4–13)
BUN SERPL-MCNC: 16 MG/DL (ref 5–25)
CALCIUM SERPL-MCNC: 8.4 MG/DL (ref 8.4–10.2)
CHLORIDE SERPL-SCNC: 106 MMOL/L (ref 96–108)
CO2 SERPL-SCNC: 19 MMOL/L (ref 21–32)
CREAT SERPL-MCNC: 1.07 MG/DL (ref 0.6–1.3)
GFR SERPL CREATININE-BSD FRML MDRD: 66 ML/MIN/1.73SQ M
GLUCOSE SERPL-MCNC: 155 MG/DL (ref 65–140)
POTASSIUM SERPL-SCNC: 3.1 MMOL/L (ref 3.5–5.3)
SODIUM SERPL-SCNC: 135 MMOL/L (ref 135–147)

## 2023-02-06 ENCOUNTER — APPOINTMENT (OUTPATIENT)
Dept: LAB | Facility: HOSPITAL | Age: 38
End: 2023-02-06

## 2023-02-06 DIAGNOSIS — E87.6 HYPOKALEMIA: ICD-10-CM

## 2023-02-06 LAB
ANION GAP SERPL CALCULATED.3IONS-SCNC: 7 MMOL/L (ref 4–13)
BUN SERPL-MCNC: 14 MG/DL (ref 5–25)
CALCIUM SERPL-MCNC: 8.6 MG/DL (ref 8.4–10.2)
CHLORIDE SERPL-SCNC: 110 MMOL/L (ref 96–108)
CO2 SERPL-SCNC: 21 MMOL/L (ref 21–32)
CREAT SERPL-MCNC: 1.18 MG/DL (ref 0.6–1.3)
GFR SERPL CREATININE-BSD FRML MDRD: 58 ML/MIN/1.73SQ M
GLUCOSE P FAST SERPL-MCNC: 71 MG/DL (ref 65–99)
POTASSIUM SERPL-SCNC: 3.2 MMOL/L (ref 3.5–5.3)
SODIUM SERPL-SCNC: 138 MMOL/L (ref 135–147)

## 2023-02-09 DIAGNOSIS — N20.0 NEPHROLITHIASIS: ICD-10-CM

## 2023-02-09 RX ORDER — TAMSULOSIN HYDROCHLORIDE 0.4 MG/1
CAPSULE ORAL
Qty: 30 CAPSULE | Refills: 0 | Status: SHIPPED | OUTPATIENT
Start: 2023-02-09

## 2023-02-21 ENCOUNTER — APPOINTMENT (OUTPATIENT)
Dept: LAB | Facility: HOSPITAL | Age: 38
End: 2023-02-21

## 2023-02-21 DIAGNOSIS — E87.6 HYPOKALEMIA: ICD-10-CM

## 2023-02-21 LAB
ANION GAP SERPL CALCULATED.3IONS-SCNC: 4 MMOL/L (ref 4–13)
BUN SERPL-MCNC: 10 MG/DL (ref 5–25)
CALCIUM SERPL-MCNC: 8 MG/DL (ref 8.4–10.2)
CHLORIDE SERPL-SCNC: 113 MMOL/L (ref 96–108)
CO2 SERPL-SCNC: 23 MMOL/L (ref 21–32)
CREAT SERPL-MCNC: 0.94 MG/DL (ref 0.6–1.3)
GFR SERPL CREATININE-BSD FRML MDRD: 76 ML/MIN/1.73SQ M
GLUCOSE P FAST SERPL-MCNC: 65 MG/DL (ref 65–99)
POTASSIUM SERPL-SCNC: 3.5 MMOL/L (ref 3.5–5.3)
SODIUM SERPL-SCNC: 140 MMOL/L (ref 135–147)

## 2023-02-27 ENCOUNTER — APPOINTMENT (OUTPATIENT)
Dept: LAB | Facility: HOSPITAL | Age: 38
End: 2023-02-27

## 2023-02-27 DIAGNOSIS — E87.6 HYPOKALEMIA: ICD-10-CM

## 2023-02-27 LAB
ANION GAP SERPL CALCULATED.3IONS-SCNC: 6 MMOL/L (ref 4–13)
BUN SERPL-MCNC: 15 MG/DL (ref 5–25)
CALCIUM SERPL-MCNC: 9 MG/DL (ref 8.4–10.2)
CHLORIDE SERPL-SCNC: 108 MMOL/L (ref 96–108)
CO2 SERPL-SCNC: 25 MMOL/L (ref 21–32)
CREAT SERPL-MCNC: 1.02 MG/DL (ref 0.6–1.3)
GFR SERPL CREATININE-BSD FRML MDRD: 69 ML/MIN/1.73SQ M
GLUCOSE P FAST SERPL-MCNC: 79 MG/DL (ref 65–99)
POTASSIUM SERPL-SCNC: 3.7 MMOL/L (ref 3.5–5.3)
SODIUM SERPL-SCNC: 139 MMOL/L (ref 135–147)

## 2023-03-06 ENCOUNTER — APPOINTMENT (OUTPATIENT)
Dept: LAB | Facility: HOSPITAL | Age: 38
End: 2023-03-06

## 2023-03-06 DIAGNOSIS — E87.6 HYPOKALEMIA: ICD-10-CM

## 2023-03-06 LAB
ANION GAP SERPL CALCULATED.3IONS-SCNC: 3 MMOL/L (ref 4–13)
BUN SERPL-MCNC: 9 MG/DL (ref 5–25)
CALCIUM SERPL-MCNC: 8.3 MG/DL (ref 8.4–10.2)
CHLORIDE SERPL-SCNC: 113 MMOL/L (ref 96–108)
CO2 SERPL-SCNC: 22 MMOL/L (ref 21–32)
CREAT SERPL-MCNC: 0.94 MG/DL (ref 0.6–1.3)
GFR SERPL CREATININE-BSD FRML MDRD: 76 ML/MIN/1.73SQ M
GLUCOSE SERPL-MCNC: 99 MG/DL (ref 65–140)
POTASSIUM SERPL-SCNC: 3.3 MMOL/L (ref 3.5–5.3)
SODIUM SERPL-SCNC: 138 MMOL/L (ref 135–147)

## 2023-03-13 ENCOUNTER — APPOINTMENT (OUTPATIENT)
Dept: LAB | Facility: HOSPITAL | Age: 38
End: 2023-03-13

## 2023-03-13 ENCOUNTER — TELEPHONE (OUTPATIENT)
Dept: NEPHROLOGY | Facility: CLINIC | Age: 38
End: 2023-03-13

## 2023-03-13 DIAGNOSIS — E87.6 HYPOKALEMIA: Primary | ICD-10-CM

## 2023-03-13 LAB
ANION GAP SERPL CALCULATED.3IONS-SCNC: 5 MMOL/L (ref 4–13)
BUN SERPL-MCNC: 17 MG/DL (ref 5–25)
CALCIUM SERPL-MCNC: 7.9 MG/DL (ref 8.4–10.2)
CHLORIDE SERPL-SCNC: 112 MMOL/L (ref 96–108)
CO2 SERPL-SCNC: 21 MMOL/L (ref 21–32)
CREAT SERPL-MCNC: 0.99 MG/DL (ref 0.6–1.3)
GFR SERPL CREATININE-BSD FRML MDRD: 72 ML/MIN/1.73SQ M
GLUCOSE P FAST SERPL-MCNC: 71 MG/DL (ref 65–99)
POTASSIUM SERPL-SCNC: 3.6 MMOL/L (ref 3.5–5.3)
SODIUM SERPL-SCNC: 138 MMOL/L (ref 135–147)

## 2023-03-13 NOTE — TELEPHONE ENCOUNTER
Okay thank you, the weekly order must have run out, it was scheduled to be good for 1 year  I will place a new order right now

## 2023-03-13 NOTE — TELEPHONE ENCOUNTER
Pt went to the hospital for her weekly BMP  There was no order so I placed one  Just a heads up that I did this

## 2023-03-20 ENCOUNTER — APPOINTMENT (OUTPATIENT)
Dept: LAB | Facility: HOSPITAL | Age: 38
End: 2023-03-20

## 2023-03-24 NOTE — ASSESSMENT & PLAN NOTE
· Resolved     · Topamax is being tapered off as well Solaraze Counseling:  I discussed with the patient the risks of Solaraze including but not limited to erythema, scaling, itching, weeping, crusting, and pain.

## 2023-03-27 ENCOUNTER — APPOINTMENT (OUTPATIENT)
Dept: LAB | Facility: HOSPITAL | Age: 38
End: 2023-03-27

## 2023-03-28 ENCOUNTER — HOSPITAL ENCOUNTER (EMERGENCY)
Facility: HOSPITAL | Age: 38
Discharge: HOME | End: 2023-03-29
Attending: EMERGENCY MEDICINE

## 2023-03-28 ENCOUNTER — APPOINTMENT (EMERGENCY)
Dept: CT IMAGING | Facility: HOSPITAL | Age: 38
End: 2023-03-28

## 2023-03-28 VITALS
DIASTOLIC BLOOD PRESSURE: 56 MMHG | BODY MASS INDEX: 21.4 KG/M2 | TEMPERATURE: 97.2 F | HEART RATE: 83 BPM | OXYGEN SATURATION: 100 % | SYSTOLIC BLOOD PRESSURE: 112 MMHG | RESPIRATION RATE: 16 BRPM | WEIGHT: 117 LBS

## 2023-03-28 DIAGNOSIS — N20.1 URETEROLITHIASIS: ICD-10-CM

## 2023-03-28 DIAGNOSIS — R10.9 RIGHT FLANK PAIN: Primary | ICD-10-CM

## 2023-03-28 LAB
BACTERIA UR QL AUTO: ABNORMAL /HPF
BASOPHILS # BLD AUTO: 0.06 THOUSANDS/ÂΜL (ref 0–0.1)
BASOPHILS NFR BLD AUTO: 1 % (ref 0–1)
BILIRUB UR QL STRIP: NEGATIVE
CLARITY UR: CLEAR
COLOR UR: YELLOW
EOSINOPHIL # BLD AUTO: 0.08 THOUSAND/ÂΜL (ref 0–0.61)
EOSINOPHIL NFR BLD AUTO: 1 % (ref 0–6)
ERYTHROCYTE [DISTWIDTH] IN BLOOD BY AUTOMATED COUNT: 15 % (ref 11.6–15.1)
EXT PREGNANCY TEST URINE: NEGATIVE
EXT. CONTROL: NORMAL
GLUCOSE UR STRIP-MCNC: NEGATIVE MG/DL
HCT VFR BLD AUTO: 36.2 % (ref 34.8–46.1)
HGB BLD-MCNC: 11.5 G/DL (ref 11.5–15.4)
HGB UR QL STRIP.AUTO: ABNORMAL
IMM GRANULOCYTES # BLD AUTO: 0.03 THOUSAND/UL (ref 0–0.2)
IMM GRANULOCYTES NFR BLD AUTO: 0 % (ref 0–2)
KETONES UR STRIP-MCNC: NEGATIVE MG/DL
LEUKOCYTE ESTERASE UR QL STRIP: NEGATIVE
LYMPHOCYTES # BLD AUTO: 2.11 THOUSANDS/ÂΜL (ref 0.6–4.47)
LYMPHOCYTES NFR BLD AUTO: 18 % (ref 14–44)
MCH RBC QN AUTO: 28 PG (ref 26.8–34.3)
MCHC RBC AUTO-ENTMCNC: 31.8 G/DL (ref 31.4–37.4)
MCV RBC AUTO: 88 FL (ref 82–98)
MONOCYTES # BLD AUTO: 0.9 THOUSAND/ÂΜL (ref 0.17–1.22)
MONOCYTES NFR BLD AUTO: 8 % (ref 4–12)
NEUTROPHILS # BLD AUTO: 8.66 THOUSANDS/ÂΜL (ref 1.85–7.62)
NEUTS SEG NFR BLD AUTO: 72 % (ref 43–75)
NITRITE UR QL STRIP: NEGATIVE
NON-SQ EPI CELLS URNS QL MICRO: ABNORMAL /HPF
NRBC BLD AUTO-RTO: 0 /100 WBCS
PH UR STRIP.AUTO: 6 [PH]
PLATELET # BLD AUTO: 384 THOUSANDS/UL (ref 149–390)
PMV BLD AUTO: 10 FL (ref 8.9–12.7)
PROT UR STRIP-MCNC: ABNORMAL MG/DL
RBC # BLD AUTO: 4.1 MILLION/UL (ref 3.81–5.12)
RBC #/AREA URNS AUTO: ABNORMAL /HPF
SP GR UR STRIP.AUTO: 1.01 (ref 1–1.03)
UROBILINOGEN UR QL STRIP.AUTO: 0.2 E.U./DL
WBC # BLD AUTO: 11.84 THOUSAND/UL (ref 4.31–10.16)
WBC #/AREA URNS AUTO: ABNORMAL /HPF

## 2023-03-28 RX ORDER — FENTANYL CITRATE 50 UG/ML
50 INJECTION, SOLUTION INTRAMUSCULAR; INTRAVENOUS ONCE
Status: COMPLETED | OUTPATIENT
Start: 2023-03-28 | End: 2023-03-28

## 2023-03-28 RX ADMIN — FENTANYL CITRATE 50 MCG: 50 INJECTION INTRAMUSCULAR; INTRAVENOUS at 23:24

## 2023-03-28 NOTE — Clinical Note
Jihan Haydeliana was seen and treated in our emergency department on 3/28/2023  Diagnosis:     Ivis Hernandez  may return to work on return date  She may return on this date: 03/31/2023         If you have any questions or concerns, please don't hesitate to call        Shira Gabriel MD    ______________________________           _______________          _______________  Hospital Representative                              Date                                Time

## 2023-03-29 LAB
ALBUMIN SERPL BCP-MCNC: 3.7 G/DL (ref 3.5–5)
ALP SERPL-CCNC: 41 U/L (ref 34–104)
ALT SERPL W P-5'-P-CCNC: 14 U/L (ref 7–52)
ANION GAP SERPL CALCULATED.3IONS-SCNC: 7 MMOL/L (ref 4–13)
AST SERPL W P-5'-P-CCNC: 16 U/L (ref 13–39)
BILIRUB SERPL-MCNC: 0.28 MG/DL (ref 0.2–1)
BUN SERPL-MCNC: 19 MG/DL (ref 5–25)
CALCIUM SERPL-MCNC: 8.3 MG/DL (ref 8.4–10.2)
CHLORIDE SERPL-SCNC: 105 MMOL/L (ref 96–108)
CO2 SERPL-SCNC: 23 MMOL/L (ref 21–32)
CREAT SERPL-MCNC: 1.18 MG/DL (ref 0.6–1.3)
GFR SERPL CREATININE-BSD FRML MDRD: 58 ML/MIN/1.73SQ M
GLUCOSE SERPL-MCNC: 81 MG/DL (ref 65–140)
LIPASE SERPL-CCNC: 491 U/L (ref 11–82)
POTASSIUM SERPL-SCNC: 3 MMOL/L (ref 3.5–5.3)
PROT SERPL-MCNC: 7 G/DL (ref 6.4–8.4)
SODIUM SERPL-SCNC: 135 MMOL/L (ref 135–147)

## 2023-03-29 RX ORDER — FENTANYL CITRATE 50 UG/ML
50 INJECTION, SOLUTION INTRAMUSCULAR; INTRAVENOUS ONCE
Status: COMPLETED | OUTPATIENT
Start: 2023-03-29 | End: 2023-03-29

## 2023-03-29 RX ORDER — POTASSIUM CHLORIDE 14.9 MG/ML
20 INJECTION INTRAVENOUS ONCE
Status: DISCONTINUED | OUTPATIENT
Start: 2023-03-29 | End: 2023-03-29

## 2023-03-29 RX ORDER — POTASSIUM CHLORIDE 20 MEQ/1
20 TABLET, EXTENDED RELEASE ORAL ONCE
Status: COMPLETED | OUTPATIENT
Start: 2023-03-29 | End: 2023-03-29

## 2023-03-29 RX ADMIN — POTASSIUM CHLORIDE 20 MEQ: 20 TABLET, EXTENDED RELEASE ORAL at 00:44

## 2023-03-29 RX ADMIN — FENTANYL CITRATE 50 MCG: 50 INJECTION INTRAMUSCULAR; INTRAVENOUS at 00:44

## 2023-03-29 NOTE — ED PROVIDER NOTES
"History  Chief Complaint   Patient presents with   • Flank Pain     Right sided flank pain since 4pm, did not take any pain medications  Does have a history of kidney stones, stated this is similar  History provided by:  Medical records and patient  Flank Pain  Pain location:  R flank  Pain quality: aching and dull    Pain radiates to:  RLQ  Pain severity:  Moderate  Onset quality:  Gradual  Duration:  1 day  Timing:  Constant  Progression:  Waxing and waning  Chronicity:  Recurrent  Context comment:  Right flank pain rating to the right lower quadrant, started this morning, waxing and waning, associated with inability to fully void  Relieved by:  Nothing  Worsened by:  Nothing  Ineffective treatments:  None tried  Associated symptoms: no chest pain, no chills, no cough, no diarrhea, no dysuria, no fatigue, no fever, no hematuria, no nausea, no shortness of breath, no sore throat and no vomiting        Prior to Admission Medications   Prescriptions Last Dose Informant Patient Reported? Taking? AMILoride 5 mg tablet   No No   Sig: Take 1 tablet (5 mg total) by mouth daily   B Complex Vitamins (VITAMIN B COMPLEX PO)   Yes No   Sig: Take 1 capsule by mouth daily     Catheters MISC   No No   Sig: by Does not apply route 2 (two) times a week Port care per [employer], Disp, (SYRINGE 3CC/25GX5/8\") 25G X 5/8\" 3 ML MISC   No No   Sig: Use once monthly for B12 injection     amitriptyline (ELAVIL) 10 mg tablet   No No   Sig: Take 2 tablets (20 mg total) by mouth daily at bedtime   cyanocobalamin 1,000 mcg/mL   No No   Sig: Inject 1 mL (1,000 mcg total) into a muscle every 30 (thirty) days   ergocalciferol (ERGOCALCIFEROL) 1 25 MG (77743 UT) capsule   No No   Sig: Take 1 capsule (50,000 Units total) by mouth once a week Mondays and Thursdays   escitalopram (LEXAPRO) 10 mg tablet   Yes No   Sig: Take 20 mg by mouth daily at bedtime    ferrous sulfate 325 (65 Fe) mg tablet   Yes No   Sig: Take 325 mg " "by mouth every other day Last took 3/9/20    ondansetron (ZOFRAN) 4 mg tablet   No No   Sig: Take 1 tablet (4 mg total) by mouth every 8 (eight) hours as needed for nausea or vomiting   potassium chloride 0 4 mEq/mL   No No   Sig: Inject 200 mL (80 mEq total) into a catheter in a vein daily 80 meq in 1 litre bag to be infused daily over 8 hours      Continue as ordered prior to admission   senna (SENOKOT) 8 6 mg   No No   Sig: Take 1 tablet (8 6 mg total) by mouth daily at bedtime for 5 days   tamsulosin (FLOMAX) 0 4 mg 3/28/2023  No Yes   Sig: TAKE ONE CAPSULE BY MOUTH IN THE MORNING      Facility-Administered Medications: None       Past Medical History:   Diagnosis Date   • C  difficile colitis    • COVID-19     2022- pt denies long covid   • DUB (dysfunctional uterine bleeding)    • H  pylori infection    • Hypertension    • Hypokalemia    • Left lower quadrant abdominal pain 2020   • Migraine    • Psychiatric disorder     Anxiety   • Rectal bleeding    • Renal disorder    • Rhabdomyolysis    • Thrombosed external hemorrhoid        Past Surgical History:   Procedure Laterality Date   • ABDOMINAL SURGERY     • APPENDECTOMY     •  SECTION     • CHOLECYSTECTOMY     • COSMETIC SURGERY      \"tummy tuck\"   • FL GUIDED CENTRAL VENOUS ACCESS DEVICE INSERTION  2018   • FL RETROGRADE PYELOGRAM  1/3/2023   • GASTRIC BYPASS     • HYSTERECTOMY     • LAPAROSCOPY     • AK ANRCT XM SURG REQ ANES GENERAL SPI/EDRL DX N/A 2021    Procedure: ANAL EXAM UNDER ANESTHESIA; HEMORRHOIDECTOMY;  Surgeon: Adam Jewell DO;  Location:  MAIN OR;  Service: General   • AK CYSTO/URETERO W/LITHOTRIPSY &INDWELL STENT INSRT Left 1/3/2023    Procedure: CYSTOSCOPY URETEROSCOPY WITH RETROGRADE PYELOGRAM, BASKET STONE EXTRACTION AND INSERTION STENT URETERAL;  Surgeon: Melly Schreiber MD;  Location:  MAIN OR;  Service: Urology   • AK EXC THROMBOSED HEMORRHOID [address] N/A 2022    Procedure: EXCISION OF " THROMBOSED EXTERNAL HEMORRHOID, Excision of perianal skin tag, dranage of perianal abscess, anal fistulatomy;  Surgeon: Mateusz Corey DO;  Location:  MAIN OR;  Service: General   • TUNNELED VENOUS PORT PLACEMENT N/A 12/21/2018    Procedure: INSERTION VENOUS PORT (PORT-A-CATH); Surgeon: Mau Blake DO;  Location: [facility];  Service: General       Family History   Problem Relation Age of Onset   • No Known Problems Mother    • Hypertension Father    • Diabetes Father    • Diabetes Maternal Grandfather      I have reviewed and agree with the history as documented  E-Cigarette/Vaping   • E-Cigarette Use Never User      E-Cigarette/Vaping Substances   • Nicotine No    • THC No    • CBD No    • Flavoring No    • Other No    • Unknown No      Social History     Tobacco Use   • Smoking status: Never   • Smokeless tobacco: Never   Vaping Use   • Vaping Use: Never used   Substance Use Topics   • Alcohol use: Never     Alcohol/week: 0 0 standard drinks     Comment: 0   • Drug use: Yes     Types: Marijuana       Review of Systems   Constitutional: Negative for chills, fatigue and fever  HENT: Negative for ear discharge, ear pain, rhinorrhea and sore throat  Eyes: Negative for pain and visual disturbance  Respiratory: Negative for cough and shortness of breath  Cardiovascular: Negative for chest pain and palpitations  Gastrointestinal: Positive for abdominal pain  Negative for diarrhea, nausea and vomiting  Endocrine: Negative for polydipsia, polyphagia and polyuria  Genitourinary: Positive for flank pain  Negative for difficulty urinating, dysuria and hematuria  Musculoskeletal: Negative for arthralgias and back pain  Skin: Negative for color change and rash  Allergic/Immunologic: Negative for immunocompromised state  Neurological: Negative for dizziness, seizures, syncope, weakness and headaches  Psychiatric/Behavioral: Negative for confusion and self-injury   The patient is not nervous/anxious  All other systems reviewed and are negative  Physical Exam  Physical Exam  Vitals and nursing note reviewed  Constitutional:       General: She is not in acute distress  Appearance: Normal appearance  She is not ill-appearing, toxic-appearing or diaphoretic  HENT:      Head: Normocephalic and atraumatic  Nose: Nose normal  No congestion or rhinorrhea  Mouth/Throat:      Mouth: Mucous membranes are moist       Pharynx: Oropharynx is clear  No oropharyngeal exudate or posterior oropharyngeal erythema  Eyes:      General:         Right eye: No discharge  Left eye: No discharge  Extraocular Movements: Extraocular movements intact  Pupils: Pupils are equal, round, and reactive to light  Cardiovascular:      Rate and Rhythm: Normal rate and regular rhythm  Pulses: Normal pulses  Heart sounds: Normal heart sounds  No murmur heard  No gallop  Comments: Port-A-Cath right chest wall, site without evidence of infection, nontender, currently accessed with [facility] needle  Pulmonary:      Effort: Pulmonary effort is normal  No respiratory distress  Breath sounds: Normal breath sounds  No stridor  No wheezing, rhonchi or rales  Chest:      Chest wall: No tenderness  Abdominal:      General: Bowel sounds are normal  There is no distension  Palpations: Abdomen is soft  There is no mass  Tenderness: There is no abdominal tenderness  There is right CVA tenderness  There is no left CVA tenderness, guarding or rebound  Hernia: No hernia is present  Musculoskeletal:         General: Normal range of motion  Cervical back: Normal range of motion and neck supple  Skin:     General: Skin is warm and dry  Capillary Refill: Capillary refill takes less than 2 seconds  Neurological:      General: No focal deficit present  Mental Status: She is alert and oriented to person, place, and time        Cranial Nerves: No cranial nerve deficit  Sensory: No sensory deficit  Motor: No weakness  Coordination: Coordination normal       Gait: Gait normal       Deep Tendon Reflexes: Reflexes normal    Psychiatric:         Mood and Affect: Mood normal          Behavior: Behavior normal          Thought Content:  Thought content normal          Judgment: Judgment normal          Vital Signs  ED Triage Vitals [03/28/23 2239]   Temperature Pulse Respirations Blood Pressure SpO2   (!) 97 2 °F (36 2 °C) 96 18 151/70 100 %      Temp Source Heart Rate Source Patient Position - Orthostatic VS BP Location FiO2 (%)   Temporal Monitor Sitting Right arm --      Pain Score       8           Vitals:    03/28/23 2239 03/28/23 2245 03/28/23 2300   BP: 151/70 123/59 112/56   Pulse: 96 83    Patient Position - Orthostatic VS: Sitting Lying          Visual Acuity      ED Medications  Medications   fentanyl citrate (PF) 100 MCG/2ML 50 mcg (has no administration in time range)   potassium chloride (K-DUR,KLOR-CON) CR tablet 20 mEq (has no administration in time range)   fentanyl citrate (PF) 100 MCG/2ML 50 mcg (50 mcg Intravenous Given 3/28/23 2324)       Diagnostic Studies  Results Reviewed     Procedure Component Value Units Date/Time    Comprehensive metabolic panel [[id number]]  (Abnormal) Collected: 03/28/23 2323    Lab Status: Final result Specimen: Blood from Arm, Right Updated: 03/29/23 0014     Sodium 135 mmol/L      Potassium 3 0 mmol/L      Chloride 105 mmol/L      CO2 23 mmol/L      ANION GAP 7 mmol/L      BUN 19 mg/dL      Creatinine 1 18 mg/dL      Glucose 81 mg/dL      Calcium 8 3 mg/dL      AST 16 U/L      ALT 14 U/L      Alkaline Phosphatase 41 U/L      Total Protein 7 0 g/dL      Albumin 3 7 g/dL      Total Bilirubin 0 28 mg/dL      eGFR 58 ml/min/1 73sq m     Narrative:      [place] guidelines for Chronic Kidney Disease (CKD):   •  Stage 1 with normal or high GFR (GFR > 90 mL/min/1 73 square meters)  •  Stage 2 Mild CKD (GFR = 60-89 mL/min/1 73 square meters)  •  Stage 3A Moderate CKD (GFR = 45-59 mL/min/1 73 square meters)  •  Stage 3B Moderate CKD (GFR = 30-44 mL/min/1 73 square meters)  •  Stage 4 Severe CKD (GFR = 15-29 mL/min/1 73 square meters)  •  Stage 5 End Stage CKD (GFR <15 mL/min/1 73 square meters)  Note: GFR calculation is accurate only with a steady state creatinine    Lipase [[id number]]  (Abnormal) Collected: 03/28/23 2323    Lab Status: Final result Specimen: Blood from Arm, Right Updated: 03/29/23 0014     Lipase 491 u/L     CBC and differential [[id number]]  (Abnormal) Collected: 03/28/23 2323    Lab Status: Final result Specimen: Blood from Arm, Right Updated: 03/28/23 2339     WBC 11 84 Thousand/uL      RBC 4 10 Million/uL      Hemoglobin 11 5 g/dL      Hematocrit 36 2 %      MCV 88 fL      MCH 28 0 pg      MCHC 31 8 g/dL      RDW 15 0 %      MPV 10 0 fL      Platelets 453 Thousands/uL      nRBC 0 /100 WBCs      Neutrophils Relative 72 %      Immat GRANS % 0 %      Lymphocytes Relative 18 %      Monocytes Relative 8 %      Eosinophils Relative 1 %      Basophils Relative 1 %      Neutrophils Absolute 8 66 Thousands/µL      Immature Grans Absolute 0 03 Thousand/uL      Lymphocytes Absolute 2 11 Thousands/µL      Monocytes Absolute 0 90 Thousand/µL      Eosinophils Absolute 0 08 Thousand/µL      Basophils Absolute 0 06 Thousands/µL     Urine Microscopic [[id number]]  (Abnormal) Collected: 03/28/23 2312    Lab Status: Final result Specimen: Urine, Clean Catch Updated: 03/28/23 2338     RBC, UA 10-20 /hpf      WBC, UA 4-10 /hpf      Epithelial Cells None Seen /hpf      Bacteria, UA None Seen /hpf     POCT pregnancy, urine [[id number]]  (Normal) Resulted: 03/28/23 2320    Lab Status: Edited Result - FINAL Updated: 03/28/23 2326     EXT Preg Test, Ur Negative     Control Valid    UA (URINE) with reflex to Scope [[id number]]  (Abnormal) Collected: 03/28/23 2312    Lab Status: Final result Specimen: Urine, Clean Catch Updated: 03/28/23 2319     Color, UA Yellow     Clarity, UA Clear     Specific Gravity, UA 1 010     pH, UA 6 0     Leukocytes, UA Negative     Nitrite, UA Negative     Protein, UA Trace mg/dl      Glucose, UA Negative mg/dl      Ketones, UA Negative mg/dl      Urobilinogen, UA 0 2 E U /dl      Bilirubin, UA Negative     Occult Blood, UA Large                 CT renal stone study abdomen pelvis wo contrast   Final Result by Cindy Slater DO (03/29 0028)      Mild right hydronephrosis  No definitive obstructing stone identified at this time  Findings may be secondary to recent passage of a stone  Large bowel is mildly dilated with multiple air-fluid levels  This can be seen in the setting of diarrhea  There is a 0 2 cm calculus in the left posterior aspect of the urinary bladder  Punctate nonobstructing left renal calculi  Workstation performed: [id number]                    Procedures  Procedures         ED Course                               SBIRT 20yo+    Flowsheet Row Most Recent Value   SBIRT (23 yo +)    In order to provide better care to our patients, we are screening all of our patients for alcohol and drug use  Would it be okay to ask you these screening questions? Yes Filed at: 03/28/2023 2248   Initial Alcohol Screen: US AUDIT-C     1  How often do you have a drink containing alcohol? 0 Filed at: 03/28/2023 2248   2  How many drinks containing alcohol do you have on a typical day you are drinking? 0 Filed at: 03/28/2023 2248   3b  FEMALE Any Age, or MALE 65+: How often do you have 4 or more drinks on one occassion? 0 Filed at: 03/28/2023 2248   Audit-C Score 0 Filed at: 03/28/2023 2248   SLIME: How many times in the past year have you    Used an illegal drug or used a prescription medication for non-medical reasons? Never Filed at: 03/28/2023 2248                    Medical Decision Making  2236: Patient appears well, vital signs reviewed    Patient has numerous medical conditions including pancreatitis, renal stones, chronic hypokalemia requiring infusions frequently via PAC  Plan to complete basic labs including urinalysis  We had a long discussion about risk benefits of reimaging, patient prefers further work-up  Will give analgesics for her discomfort and reevaluate  0030: CT and labs reviewed  Recent passage of right ureteral stone  Pain well controlled  Follow-up with urology as needed  Amount and/or Complexity of Data Reviewed  Labs: ordered  Radiology: ordered  Risk  Prescription drug management  Disposition  Final diagnoses:   Right flank pain   Ureterolithiasis     Time reflects when diagnosis was documented in both MDM as applicable and the Disposition within this note     Time User Action Codes Description Comment    3/29/2023 12:09 AM Eli Youssef Add [R10 9] Right flank pain     3/29/2023 12:31 AM Eli Youssef Add [N20 1] Ureterolithiasis       ED Disposition     ED Disposition   Discharge    Condition   Stable    Date/Time   Wed Mar 29, 2023 12:31 AM    Comment   Patience Locket discharge to home/self care  Follow-up Information     Follow up With Specialties Details Why Contact Info [facility], [facility]  Urology Schedule an appointment as soon as possible for a visit   [phone]  [address] [place] [address] [facility], [facility] , [place] [address], [facility], [address] [place] [address], [address]          Patient's Medications   Discharge Prescriptions    No medications on file       No discharge procedures on file      PDMP Review       Value Time User    PDMP Reviewed  Yes 1/2/2023  9:52  [address]          ED Provider  Electronically Signed by           Yung Caceres MD  03/29/23 3779

## 2023-03-30 ENCOUNTER — TELEPHONE (OUTPATIENT)
Dept: NEPHROLOGY | Facility: CLINIC | Age: 38
End: 2023-03-30

## 2023-03-31 RX ORDER — BETAMETHASONE DIPROPIONATE 0.5 MG/G
1 CREAM TOPICAL
COMMUNITY
Start: 2022-05-10 | End: 2023-04-03

## 2023-03-31 RX ORDER — DICYCLOMINE HYDROCHLORIDE 10 MG/1
CAPSULE ORAL
COMMUNITY
Start: 2023-01-26 | End: 2023-04-03

## 2023-03-31 RX ORDER — ESCITALOPRAM OXALATE 20 MG/1
TABLET ORAL
COMMUNITY
Start: 2023-03-14

## 2023-04-03 ENCOUNTER — APPOINTMENT (OUTPATIENT)
Dept: LAB | Facility: HOSPITAL | Age: 38
End: 2023-04-03

## 2023-04-03 ENCOUNTER — OFFICE VISIT (OUTPATIENT)
Dept: UROLOGY | Facility: CLINIC | Age: 38
End: 2023-04-03

## 2023-04-03 VITALS
BODY MASS INDEX: 21.71 KG/M2 | WEIGHT: 118 LBS | HEIGHT: 62 IN | OXYGEN SATURATION: 98 % | DIASTOLIC BLOOD PRESSURE: 60 MMHG | SYSTOLIC BLOOD PRESSURE: 88 MMHG | HEART RATE: 85 BPM | TEMPERATURE: 97.1 F

## 2023-04-03 DIAGNOSIS — N20.0 NEPHROLITHIASIS: Primary | ICD-10-CM

## 2023-04-03 DIAGNOSIS — R30.0 DYSURIA: ICD-10-CM

## 2023-04-03 DIAGNOSIS — N13.30 HYDRONEPHROSIS, UNSPECIFIED HYDRONEPHROSIS TYPE: ICD-10-CM

## 2023-04-03 NOTE — PROGRESS NOTES
4/3/2023    Chief Complaint   Patient presents with   • Follow-up     Seen at ER dx w/ kidney stone, passed stone, having burning  Assessment and Plan    43 y o  female    1  Nephrolithiasis  · Status post cystoscopy, left ureteroscopy with basket stone extraction and left ureteral stent placement by Dr Noelle Corbett on 1/3/2023  · She had a more recent ER visit at [facility] emergency department on 3/20/2023 for complaints of right flank pain  CT imaging showed mild right-sided hydronephrosis, no definitive obstructing stone identified  Findings may be secondary to recently passed stone  There was a 0 2 cm calculus in the left posterior aspect of your bladder in addition to punctate nonobstructing left renal calculi  She was able to spontaneously pass this the following day  · Will check Renal US in 3-4 weeks and call her with those results  If US is normal, she can follow-up in 1 year  2  Dysuria  · This is likely due to the passage of a recent stone  Denies any fever or chills  Offered pyridium, but she declined today  · She will call if symptoms worsen and we can repeat urine testing as indicated  Subjective:    She presents today reporting that she was able to pass a small bladder stone on Wednesday the day after being evaluated at [address] [place] [address] emergency department for right flank pain  She was unable to collect the stone  Her right flank pain is almost completely resolved  She does however complain of persistent dysuria  She denies any fevers or chills  History of Present Illness  Luther Parks is a 43 y o  female here for follow-up evaluation status post cystoscopy, left ureteroscopy with basket stone extraction and left ureteral stent placement by Dr Noelle Corbett on 1/3/2023  Originally presented to [facility] emergency department on 1/2/2023 for complaints of left flank pain  CT imaging showed mild left-sided hydronephrosis secondary to 2 distal ureteral calculi each measuring approximately 3 mm    She "was ultimately transferred to [place] [id number] for urologic intervention and underwent cystoscopy, left ureteroscopy with basket stone extraction, left ureteral stent placement by Dr Krys Diamond on 1/3/2023  More recently she presented to [facility] emergency department on 3/28/2023 for complaints of right flank pain  CT imaging from 3/20/2023 showed mild right sided hydronephrosis, no definitive obstructing stone identified  Findings may be secondary to recently passed stone  There was a 0 2 cm calculus in the left posterior aspect of the urinary bladder in addition to punctate nonobstructing left renal calculi  Review of Systems   Constitutional: Negative for chills and fever  HENT: Negative for ear pain and sore throat  Eyes: Negative for pain and visual disturbance  Respiratory: Negative for cough and shortness of breath  Cardiovascular: Negative for chest pain and palpitations  Gastrointestinal: Negative for abdominal pain, constipation, diarrhea, nausea and vomiting  Genitourinary: Positive for dysuria  Negative for difficulty urinating, flank pain, frequency, hematuria, pelvic pain and urgency  Musculoskeletal: Negative for arthralgias and back pain  Skin: Negative for color change and rash  Neurological: Negative for dizziness, seizures, syncope and weakness  All other systems reviewed and are negative  Vitals  Vitals:    04/03/23 0822   BP: (!) 88/60   BP Location: Left arm   Patient Position: Sitting   Cuff Size: Adult   Pulse: 85   Temp: (!) 97 1 °F (36 2 °C)   SpO2: 98%   Weight: 53 5 kg (118 lb)   Height: 5' 2\" (1 575 m)       Physical Exam  Vitals reviewed  Constitutional:       General: She is not in acute distress  Appearance: Normal appearance  She is normal weight  She is not ill-appearing or toxic-appearing  HENT:      Head: Normocephalic and atraumatic  Nose: Nose normal    Eyes:      General: No scleral icterus       " Conjunctiva/sclera: Conjunctivae normal    Cardiovascular:      Rate and Rhythm: Normal rate  Pulses: Normal pulses  Heart sounds: Normal heart sounds  Pulmonary:      Effort: Pulmonary effort is normal  No respiratory distress  Breath sounds: Normal breath sounds  Abdominal:      General: Abdomen is flat  Palpations: Abdomen is soft  Tenderness: There is no abdominal tenderness  There is no right CVA tenderness or left CVA tenderness  Hernia: No hernia is present  Musculoskeletal:         General: Normal range of motion  Cervical back: Normal range of motion  Skin:     General: Skin is warm and dry  Neurological:      General: No focal deficit present  Mental Status: She is alert and oriented to person, place, and time  Mental status is at baseline  Psychiatric:         Mood and Affect: Mood normal          Behavior: Behavior normal          Thought Content:  Thought content normal          Judgment: Judgment normal          Past History  Past Medical History:   Diagnosis Date   • C  difficile colitis    • COVID-19     1/2022- pt denies long covid   • DUB (dysfunctional uterine bleeding)    • H  pylori infection    • Hypertension    • Hypokalemia    • Kidney stone November 2023   • Left lower quadrant abdominal pain 08/17/2020   • Migraine    • Psychiatric disorder     Anxiety   • Rectal bleeding    • Renal disorder    • Rhabdomyolysis    • Thrombosed external hemorrhoid      Social History     Socioeconomic History   • Marital status: /Civil Union     Spouse name: None   • Number of children: None   • Years of education: None   • Highest education level: None   Occupational History   • None   Tobacco Use   • Smoking status: Never   • Smokeless tobacco: Never   Vaping Use   • Vaping Use: Never used   Substance and Sexual Activity   • Alcohol use: No     Comment: 0   • Drug use: Yes     Types: Marijuana     Comment: Medical marijuana   • Sexual activity: Yes Partners: Male     Birth control/protection: Surgical   Other Topics Concern   • None   Social History Narrative    Rarely consumes alcohol - As per [employer]      Social Determinants of Health     Financial Resource Strain: Not on file   Food Insecurity: Food Insecurity Present   • Worried About [address] in the Last Year: Sometimes true   • Ran Out of Food in the Last Year: Sometimes true   Transportation Needs: Unknown   • Lack of Transportation (Medical): Not on file   • Lack of Transportation (Non-Medical): No   Physical Activity: Not on file   Stress: Not on file   Social Connections: Not on file   Intimate Partner Violence: Not on file   Housing Stability: Low Risk    • Unable to Pay for Housing in the Last Year: No   • Number of Places Lived in the Last Year: 1   • Unstable Housing in the Last Year: No     Social History     Tobacco Use   Smoking Status Never   Smokeless Tobacco Never     Family History   Problem Relation Age of Onset   • No Known Problems Mother    • Hypertension Father    • Diabetes Father    • Urolithiasis Father    • Prostate cancer Father    • Diabetes Maternal Grandfather        The following portions of the patient's history were reviewed and updated as appropriate allergies, current medications, past medical history, past social history, past surgical history and problem list    Imaging:    3/28/2023  CT ABDOMEN AND PELVIS WITHOUT IV CONTRAST - LOW DOSE RENAL STONE      INDICATION:   Flank pain, kidney stone suspected  right flank pain radiating to RLQ      COMPARISON:  Multiple priors most recently 1/3/23      TECHNIQUE:  Low radiation dose thin section CT examination of the abdomen and pelvis was performed without intravenous or oral contrast according to a protocol specifically designed to evaluate for urinary tract calculus  Axial, sagittal, and coronal 2D   reformatted images were created from the source data and submitted for interpretation    Evaluation for pathology in the abdomen and pelvis that is unrelated to urinary tract calculi is limited        Radiation dose length product (DLP) for this visit:  336 mGy-cm   This examination, like all CT scans performed in the [facility], was performed utilizing techniques to minimize radiation dose exposure, including the use of iterative   reconstruction and automated exposure control      URINARY TRACT FINDINGS:     RIGHT KIDNEY AND URETER:  Mild right hydronephrosis  No definitive obstructing stone identified      LEFT KIDNEY AND URETER:  A few punctate nonobstructing calculi  No hydronephrosis or hydroureter      URINARY BLADDER:  Within the left posterior urinary bladder there is a 0 3 similar calculus         ADDITIONAL FINDINGS:     LOWER CHEST:  No clinically significant abnormality identified in the visualized lower chest      SOLID VISCERA: Limited low radiation dose noncontrast CT evaluation demonstrates no clinically significant abnormality of the imaged portions of the liver, spleen, pancreas, or adrenal glands        GALLBLADDER/BILIARY TREE:  Gallbladder is surgically absent  No biliary dilatation      STOMACH AND BOWEL:  Status post gastric bypass  No evidence of bowel obstruction  Large bowel is mildly dilated with multiple air-fluid levels      APPENDIX:  No findings to suggest appendicitis      ABDOMINOPELVIC CAVITY:  No ascites  No pneumoperitoneum  No lymphadenopathy      REPRODUCTIVE ORGANS:  Unremarkable for patient's age      ABDOMINAL WALL/INGUINAL REGIONS:  Unremarkable      OSSEOUS STRUCTURES:  No acute fracture or destructive osseous lesion         IMPRESSION:     Mild right hydronephrosis  No definitive obstructing stone identified at this time  Findings may be secondary to recent passage of a stone      Large bowel is mildly dilated with multiple air-fluid levels    This can be seen in the setting of diarrhea      There is a 0 2 cm calculus in the left posterior aspect of the urinary bladder      Punctate nonobstructing left renal calculi  Results  No results found for this or any previous visit (from the past 1 hour(s))  ]  No results found for: PSA  Lab Results   Component Value Date    CALCIUM 8 3 (L) 03/28/2023    K 3 0 (L) 03/28/2023    CO2 23 03/28/2023     03/28/2023    BUN 19 03/28/2023    CREATININE 1 18 03/28/2023     Lab Results   Component Value Date    WBC 11 84 (H) 03/28/2023    HGB 11 5 03/28/2023    HCT 36 2 03/28/2023    MCV 88 03/28/2023     03/28/2023       Please Note:  Voice dictation software has been used to create this document  There may be inadvertent transcriptions errors       ARIADNA Pompa 04/03/23

## 2023-04-18 NOTE — H&P
[address]  H&P- Lennette Cogan 1985, 44 y o  female MRN: [id number]  Unit/Bed#: -01 Encounter: [id number]  Primary Care Provider: Vinny Pal MD   Date and time admitted to hospital: 4/23/2022 10:22 PM    * Hypokalemia  Assessment & Plan  · Chronic hypokalemia, follows with Nephrology and infuses via port a cath 80 MEQ K+ daily  · History of increased renal excretion and malabsorption due to bypass surgery  · Potassium has been low the past week on outpatient labs despite trialing extra home IV potassium patient developed severe muscle spasms  · In the ED potassium 2 2 with normal magnesium  · Admitted for repletion  · Will utilize large volume NSS with 40 mEq at 100 mL an hour to provide maintenance potassium dosing and supplement with K riders according to Q 8 hour BMPs for aggressive repletion  · Monitor on telemetry  · Appreciate nephrology consultation    History of anxiety  Assessment & Plan  · Continue PTA Lexapro and Elavil  · Received Ativan x1 in the ER for panic attack    Chronic pancreatitis (HCC)  Assessment & Plan  · Continue Pancrease with meals  · Low-fat diet         Anemia  Assessment & Plan  History iron deficiency anemia  Continue ferrous sulfate      VTE Pharmacologic Prophylaxis: VTE Score: 2 Low Risk (Score 0-2) - Encourage Ambulation  Code Status: Level 1 - Full Code     Anticipated Length of Stay: Patient will be admitted on an inpatient basis with an anticipated length of stay of greater than 2 midnights secondary to Hypokalemia  Total Time for Visit, including Counseling / Coordination of Care: 30 minutes Greater than 50% of this total time spent on direct patient counseling and coordination of care      Chief Complaint:  Anxiety, muscle spasms    History of Present Illness:  Lennette Cogan is a 44 y o  female with a PMH of gastric bypass with malabsorption disorder, increased renal excretion followed by Nephrology who infuse it is 80 mEq of Abdomen , scars, soft, nontender, nondistended , no guarding or rigidity , no masses palpable , normal bowel sounds , Liver and Spleen,  no hepatosplenomegaly , liver nontender potassium 3 Port-A-Cath daily  Over the past week patient has had hypokalemia on outpatient labs and tried to replete with extra bags of potassium at home  Today patient developed severe anxiety and worsening muscle spasms prompting ER evaluation where she was found to have potassium of 2 2 with normal magnesium  Patient was presented to Medical Service for admission for repletion of potassium while monitored on telemetry  Patient did have severe panic attack in emergency department received lorazepam, at time of admission patient is very sleepy  Review of Systems:  Review of Systems   Constitutional: Negative for chills and fever  HENT: Negative for ear pain and sore throat  Eyes: Negative for pain and visual disturbance  Respiratory: Negative for cough and shortness of breath  Cardiovascular: Negative for chest pain and palpitations  Gastrointestinal: Negative for abdominal pain and vomiting  Genitourinary: Negative for dysuria and hematuria  Musculoskeletal: Positive for arthralgias and myalgias  Negative for back pain  Skin: Negative for color change and rash  Neurological: Negative for seizures and syncope  Psychiatric/Behavioral: The patient is nervous/anxious  All other systems reviewed and are negative        Past Medical and Surgical History:   Past Medical History:   Diagnosis Date    C  difficile colitis     DUB (dysfunctional uterine bleeding)     H  pylori infection     Hypertension     Hypokalemia     Left lower quadrant abdominal pain 2020    Migraine     Psychiatric disorder     Anxiety    Rectal bleeding     Renal disorder     Rhabdomyolysis        Past Surgical History:   Procedure Laterality Date    ABDOMINAL SURGERY      APPENDECTOMY       SECTION      CHOLECYSTECTOMY      COSMETIC SURGERY      "tummy tuck"    FL GUIDED CENTRAL VENOUS ACCESS DEVICE INSERTION  2018    GASTRIC BYPASS      HYSTERECTOMY      LAPAROSCOPY      NE SURG DIAGNOSTIC EXAM, ANORECTAL N/A 9/23/2021    Procedure: ANAL EXAM UNDER ANESTHESIA; HEMORRHOIDECTOMY;  Surgeon: Luly Hi DO;  Location:  MAIN OR;  Service: General    TUNNELED VENOUS PORT PLACEMENT N/A 12/21/2018    Procedure: INSERTION VENOUS PORT (PORT-A-CATH); Surgeon: Rivas Orozco DO;  Location: [facility] MAIN OR;  Service: General       Meds/Allergies:  Prior to Admission medications    Medication Sig Start Date End Date Taking?  Authorizing Provider   AMILoride 5 mg tablet Take 1 tablet (5 mg total) by mouth daily 2/20/19   Spenser Carr DO   amitriptyline (ELAVIL) 10 mg tablet Take 20 mg by mouth daily at bedtime      Historical Provider, MD   B Complex Vitamins (VITAMIN B COMPLEX PO) Take 1 capsule by mouth daily      Historical Provider, MD   Catheters MISC by Does not apply route 2 (two) times a week Port care per [place] protocol  Patient not taking: Reported on 1/4/2022 5/7/20   Wilbur Alamraz DO   cyanocobalamin 1,000 mcg/mL Inject 1 mL (1,000 mcg total) into a muscle every 30 (thirty) days 5/4/20   Wilbur Almaraz DO   ergocalciferol (ERGOCALCIFEROL) 1 25 MG (04726 UT) capsule Take 1 capsule (50,000 Units total) by mouth once a week Mondays and Thursdays 9/17/21   Wilbur Almaraz DO   escitalopram (LEXAPRO) 10 mg tablet Take 20 mg by mouth daily at bedtime     Historical Provider, MD   ferrous sulfate 325 (65 Fe) mg tablet Take 325 mg by mouth every other day Last took 3/9/20     Historical Provider, MD   lidocaine (Lidoderm) 5 % Apply 1 patch topically daily for 7 days Remove & Discard patch within 12 hours or as directed by MD 11/20/21 11/27/21  Anne-Marie Mirza MD   methocarbamol (ROBAXIN) 500 mg tablet Take 1 tablet (500 mg total) by mouth 2 (two) times a day 11/20/21   Anne-Marie Mirza MD   ondansetron (ZOFRAN) 4 mg tablet Take 1 tablet (4 mg total) by mouth every 8 (eight) hours as needed for nausea or vomiting 3/6/22   [facility] MD Tino   pancrelipase, Lip-Prot-Amyl, (Creon) 6,000 units delayed release capsule Take 6,000 units of lipase by mouth 3 (three) times a day with meals      Historical Provider, MD   potassium chloride 0 4 mEq/mL Infuse 200 mL (80 mEq total) into a venous catheter daily 80 meq in 1 litre bag to be infused daily over 8 hours 5/11/21   Anais Marks DO   Syringe/Needle, Disp, (SYRINGE 3CC/25GX5/8") 25G X 5/8" 3 ML MISC Use once monthly for B12 injection  6/1/20   Anais Marks DO     I have reviewed home medications with patient personally  Allergies: Allergies   Allergen Reactions    Nsaids Other (See Comments)     Other reaction(s): Other (Please comment)  Should not take s/p bariatric surgery  Other reaction(s):  Other (See Comments)  Should not take as per prior records  Should not take s/p bariatric surgery  Has hx of gastric bypass      Prednisone      Nausea, vomiting, diarrhea       Social History:  Marital Status: /Civil Union   Patient Pre-hospital Living Situation: With spouse  Patient Pre-hospital Level of Mobility: walks  Patient Pre-hospital Diet Restrictions: low fat  Substance Use History:   Social History     Substance and Sexual Activity   Alcohol Use Never    Alcohol/week: 0 0 standard drinks    Comment: 0     Social History     Tobacco Use   Smoking Status Never Smoker   Smokeless Tobacco Never Used     Social History     Substance and Sexual Activity   Drug Use Never       Family History:  Family History   Problem Relation Age of Onset    No Known Problems Mother     Hypertension Father     Diabetes Father     Diabetes Maternal Grandfather        Physical Exam:     Vitals:   Blood Pressure: 115/74 (04/24/22 0054)  Pulse: 74 (04/24/22 0054)  Temperature: 98 1 °F (36 7 °C) (04/24/22 0054)  Temp Source: Oral (04/24/22 0054)  Respirations: 18 (04/24/22 0054)  Height: 5' 3" (160 cm) (04/24/22 0100)  Weight - Scale: 57 6 kg (126 lb 15 8 oz) (04/23/22 2223)  SpO2: 100 % (04/24/22 0054)    Physical Exam  Vitals and nursing note reviewed  Constitutional:       General: She is not in acute distress  Appearance: She is well-developed  Comments: Sedated from Ativan  but awakens and answers questions appropriately   HENT:      Head: Normocephalic and atraumatic  Mouth/Throat:      Mouth: Mucous membranes are dry  Eyes:      Conjunctiva/sclera: Conjunctivae normal    Cardiovascular:      Rate and Rhythm: Normal rate and regular rhythm  Heart sounds: No murmur heard  Pulmonary:      Effort: Pulmonary effort is normal  No respiratory distress  Breath sounds: Normal breath sounds  Abdominal:      Palpations: Abdomen is soft  Tenderness: There is no abdominal tenderness  Musculoskeletal:         General: No swelling or tenderness  Cervical back: Neck supple  Skin:     General: Skin is warm and dry  Capillary Refill: Capillary refill takes less than 2 seconds  Neurological:      General: No focal deficit present  Mental Status: She is oriented to person, place, and time  Cranial Nerves: No cranial nerve deficit     Psychiatric:         Mood and Affect: Mood normal          Behavior: Behavior normal         Additional Data:     Lab Results:  Results from last 7 days   Lab Units 04/23/22 2235   WBC Thousand/uL 7 10   HEMOGLOBIN g/dL 14 6   HEMATOCRIT % 43 7   PLATELETS Thousands/uL 315   NEUTROS PCT % 76*   LYMPHS PCT % 16   MONOS PCT % 7   EOS PCT % 1     Results from last 7 days   Lab Units 04/23/22  2235   SODIUM mmol/L 136   POTASSIUM mmol/L 2 2*   CHLORIDE mmol/L 96*   CO2 mmol/L 26   BUN mg/dL 13   CREATININE mg/dL 1 12   ANION GAP mmol/L 14*   CALCIUM mg/dL 8 5   ALBUMIN g/dL 3 9   TOTAL BILIRUBIN mg/dL 0 46   ALK PHOS U/L 56   ALT U/L 23   AST U/L 27   GLUCOSE RANDOM mg/dL 100     Results from last 7 days   Lab Units 04/23/22  2235   INR  0 85     Results from last 7 days   Lab Units 04/23/22  2222   POC GLUCOSE mg/dl 88               Imaging: Reviewed radiology reports from this admission including: CT head  CT head without contrast   Final Result by India Zaman MD (04/23 2332)      No acute intracranial abnormality  Workstation performed: [id number]             EKG and Other Studies Reviewed on Admission:   · EKG: NSR, ST normal     ** Please Note: This note has been constructed using a voice recognition system   **

## 2023-04-24 ENCOUNTER — APPOINTMENT (OUTPATIENT)
Dept: LAB | Facility: HOSPITAL | Age: 38
End: 2023-04-24

## 2023-04-24 ENCOUNTER — HOSPITAL ENCOUNTER (OUTPATIENT)
Dept: ULTRASOUND IMAGING | Facility: HOSPITAL | Age: 38
Discharge: HOME | End: 2023-04-24

## 2023-04-24 DIAGNOSIS — N20.0 NEPHROLITHIASIS: ICD-10-CM

## 2023-04-24 DIAGNOSIS — N13.30 HYDRONEPHROSIS, UNSPECIFIED HYDRONEPHROSIS TYPE: ICD-10-CM

## 2023-04-25 ENCOUNTER — TELEPHONE (OUTPATIENT)
Dept: UROLOGY | Facility: CLINIC | Age: 38
End: 2023-04-25

## 2023-04-25 NOTE — TELEPHONE ENCOUNTER
----- Message from Sol U  78  sent at 4/25/2023  9:43 AM EDT -----  Please let patient know her renal ultrasound shows no evidence of hydronephrosis bilaterally  She had likely passed a small right renal calculi at the time of her emergency department visit  She can follow-up in 1 year

## 2023-04-25 NOTE — RESULT ENCOUNTER NOTE
Please let patient know her renal ultrasound shows no evidence of hydronephrosis bilaterally  She had likely passed a small right renal calculi at the time of her emergency department visit  She can follow-up in 1 year

## 2023-04-27 NOTE — TELEPHONE ENCOUNTER
Tried reaching patient again, no answer  Fast busy signal   Will try again  Dupixent Counseling: I discussed with the patient the risks of dupilumab including but not limited to eye infection and irritation, cold sores, injection site reactions, worsening of asthma, allergic reactions and increased risk of parasitic infection.  Live vaccines should be avoided while taking dupilumab. Dupilumab will also interact with certain medications such as warfarin and cyclosporine. The patient understands that monitoring is required and they must alert us or the primary physician if symptoms of infection or other concerning signs are noted.

## 2023-05-01 ENCOUNTER — APPOINTMENT (OUTPATIENT)
Dept: LAB | Facility: HOSPITAL | Age: 38
End: 2023-05-01

## 2023-05-15 ENCOUNTER — APPOINTMENT (OUTPATIENT)
Dept: LAB | Facility: HOSPITAL | Age: 38
End: 2023-05-15

## 2023-05-22 ENCOUNTER — APPOINTMENT (OUTPATIENT)
Dept: LAB | Facility: HOSPITAL | Age: 38
End: 2023-05-22

## 2023-05-23 ENCOUNTER — APPOINTMENT (EMERGENCY)
Dept: CT IMAGING | Facility: HOSPITAL | Age: 38
End: 2023-05-23

## 2023-05-23 ENCOUNTER — HOSPITAL ENCOUNTER (INPATIENT)
Facility: HOSPITAL | Age: 38
LOS: 3 days | Discharge: HOME | End: 2023-05-26
Attending: EMERGENCY MEDICINE | Admitting: STUDENT IN AN ORGANIZED HEALTH CARE EDUCATION/TRAINING PROGRAM

## 2023-05-23 DIAGNOSIS — E87.6 HYPOKALEMIA: ICD-10-CM

## 2023-05-23 DIAGNOSIS — R10.32 LEFT LOWER QUADRANT ABDOMINAL PAIN: ICD-10-CM

## 2023-05-23 DIAGNOSIS — K52.9 ENTEROCOLITIS: ICD-10-CM

## 2023-05-23 DIAGNOSIS — E86.0 DEHYDRATION: ICD-10-CM

## 2023-05-23 DIAGNOSIS — K85.90 ACUTE PANCREATITIS WITHOUT INFECTION OR NECROSIS, UNSPECIFIED PANCREATITIS TYPE: ICD-10-CM

## 2023-05-23 DIAGNOSIS — K56.1 INTUSSUSCEPTION (HCC): Primary | ICD-10-CM

## 2023-05-23 PROBLEM — R10.12 LEFT UPPER QUADRANT ABDOMINAL PAIN: Status: ACTIVE | Noted: 2019-07-14

## 2023-05-23 PROBLEM — K85.00 IDIOPATHIC ACUTE PANCREATITIS WITHOUT INFECTION OR NECROSIS: Status: ACTIVE | Noted: 2022-09-01

## 2023-05-23 LAB
ALBUMIN SERPL BCP-MCNC: 3.9 G/DL (ref 3.5–5)
ALP SERPL-CCNC: 46 U/L (ref 34–104)
ALT SERPL W P-5'-P-CCNC: 14 U/L (ref 7–52)
ANION GAP SERPL CALCULATED.3IONS-SCNC: 7 MMOL/L (ref 4–13)
ANION GAP SERPL CALCULATED.3IONS-SCNC: 9 MMOL/L (ref 4–13)
AST SERPL W P-5'-P-CCNC: 18 U/L (ref 13–39)
BACTERIA UR QL AUTO: NORMAL /HPF
BASOPHILS # BLD AUTO: 0.07 THOUSANDS/ÂΜL (ref 0–0.1)
BASOPHILS NFR BLD AUTO: 1 % (ref 0–1)
BILIRUB SERPL-MCNC: 0.29 MG/DL (ref 0.2–1)
BILIRUB UR QL STRIP: NEGATIVE
BUN SERPL-MCNC: 10 MG/DL (ref 5–25)
BUN SERPL-MCNC: 11 MG/DL (ref 5–25)
CALCIUM SERPL-MCNC: 8.2 MG/DL (ref 8.4–10.2)
CALCIUM SERPL-MCNC: 8.3 MG/DL (ref 8.4–10.2)
CHLORIDE SERPL-SCNC: 107 MMOL/L (ref 96–108)
CHLORIDE SERPL-SCNC: 110 MMOL/L (ref 96–108)
CLARITY UR: CLEAR
CO2 SERPL-SCNC: 18 MMOL/L (ref 21–32)
CO2 SERPL-SCNC: 19 MMOL/L (ref 21–32)
COLOR UR: YELLOW
CREAT SERPL-MCNC: 1.02 MG/DL (ref 0.6–1.3)
CREAT SERPL-MCNC: 1.02 MG/DL (ref 0.6–1.3)
EOSINOPHIL # BLD AUTO: 0.06 THOUSAND/ÂΜL (ref 0–0.61)
EOSINOPHIL NFR BLD AUTO: 1 % (ref 0–6)
ERYTHROCYTE [DISTWIDTH] IN BLOOD BY AUTOMATED COUNT: 15 % (ref 11.6–15.1)
EXT PREGNANCY TEST URINE: NEGATIVE
EXT. CONTROL: NORMAL
GFR SERPL CREATININE-BSD FRML MDRD: 69 ML/MIN/1.73SQ M
GFR SERPL CREATININE-BSD FRML MDRD: 69 ML/MIN/1.73SQ M
GLUCOSE SERPL-MCNC: 84 MG/DL (ref 65–140)
GLUCOSE SERPL-MCNC: 96 MG/DL (ref 65–140)
GLUCOSE UR STRIP-MCNC: NEGATIVE MG/DL
HCT VFR BLD AUTO: 34.4 % (ref 34.8–46.1)
HGB BLD-MCNC: 10.6 G/DL (ref 11.5–15.4)
HGB UR QL STRIP.AUTO: NEGATIVE
IMM GRANULOCYTES # BLD AUTO: 0.03 THOUSAND/UL (ref 0–0.2)
IMM GRANULOCYTES NFR BLD AUTO: 0 % (ref 0–2)
KETONES UR STRIP-MCNC: NEGATIVE MG/DL
LEUKOCYTE ESTERASE UR QL STRIP: NEGATIVE
LIPASE SERPL-CCNC: 779 U/L (ref 11–82)
LYMPHOCYTES # BLD AUTO: 1.96 THOUSANDS/ÂΜL (ref 0.6–4.47)
LYMPHOCYTES NFR BLD AUTO: 26 % (ref 14–44)
MAGNESIUM SERPL-MCNC: 2.1 MG/DL (ref 1.9–2.7)
MCH RBC QN AUTO: 25.9 PG (ref 26.8–34.3)
MCHC RBC AUTO-ENTMCNC: 30.8 G/DL (ref 31.4–37.4)
MCV RBC AUTO: 84 FL (ref 82–98)
MONOCYTES # BLD AUTO: 0.52 THOUSAND/ÂΜL (ref 0.17–1.22)
MONOCYTES NFR BLD AUTO: 7 % (ref 4–12)
NEUTROPHILS # BLD AUTO: 5.05 THOUSANDS/ÂΜL (ref 1.85–7.62)
NEUTS SEG NFR BLD AUTO: 65 % (ref 43–75)
NITRITE UR QL STRIP: NEGATIVE
NON-SQ EPI CELLS URNS QL MICRO: NORMAL /HPF
NRBC BLD AUTO-RTO: 0 /100 WBCS
PH UR STRIP.AUTO: 6 [PH]
PLATELET # BLD AUTO: 308 THOUSANDS/UL (ref 149–390)
PLATELET # BLD AUTO: 353 THOUSANDS/UL (ref 149–390)
PMV BLD AUTO: 10.4 FL (ref 8.9–12.7)
PMV BLD AUTO: 10.8 FL (ref 8.9–12.7)
POTASSIUM SERPL-SCNC: 2.7 MMOL/L (ref 3.5–5.3)
POTASSIUM SERPL-SCNC: 3 MMOL/L (ref 3.5–5.3)
PROT SERPL-MCNC: 6.9 G/DL (ref 6.4–8.4)
PROT UR STRIP-MCNC: ABNORMAL MG/DL
RBC # BLD AUTO: 4.1 MILLION/UL (ref 3.81–5.12)
RBC #/AREA URNS AUTO: NORMAL /HPF
SODIUM SERPL-SCNC: 135 MMOL/L (ref 135–147)
SODIUM SERPL-SCNC: 135 MMOL/L (ref 135–147)
SP GR UR STRIP.AUTO: >=1.03 (ref 1–1.03)
UROBILINOGEN UR QL STRIP.AUTO: 0.2 E.U./DL
WBC # BLD AUTO: 7.69 THOUSAND/UL (ref 4.31–10.16)
WBC #/AREA URNS AUTO: NORMAL /HPF

## 2023-05-23 RX ORDER — AMITRIPTYLINE HYDROCHLORIDE 10 MG/1
20 TABLET, FILM COATED ORAL
Status: DISCONTINUED | OUTPATIENT
Start: 2023-05-23 | End: 2023-05-26 | Stop reason: HOSPADM

## 2023-05-23 RX ORDER — SODIUM CHLORIDE 9 MG/ML
125 INJECTION, SOLUTION INTRAVENOUS CONTINUOUS
Status: DISCONTINUED | OUTPATIENT
Start: 2023-05-23 | End: 2023-05-25

## 2023-05-23 RX ORDER — AMILORIDE HYDROCHLORIDE 5 MG/1
5 TABLET ORAL DAILY
Status: DISCONTINUED | OUTPATIENT
Start: 2023-05-24 | End: 2023-05-26 | Stop reason: HOSPADM

## 2023-05-23 RX ORDER — DICYCLOMINE HCL 20 MG
20 TABLET ORAL 4 TIMES DAILY PRN
Status: DISCONTINUED | OUTPATIENT
Start: 2023-05-23 | End: 2023-05-26 | Stop reason: HOSPADM

## 2023-05-23 RX ORDER — ERGOCALCIFEROL 1.25 MG/1
50000 CAPSULE ORAL WEEKLY
Status: DISCONTINUED | OUTPATIENT
Start: 2023-05-25 | End: 2023-05-26 | Stop reason: HOSPADM

## 2023-05-23 RX ORDER — ENOXAPARIN SODIUM 100 MG/ML
40 INJECTION SUBCUTANEOUS DAILY
Status: DISCONTINUED | OUTPATIENT
Start: 2023-05-24 | End: 2023-05-26 | Stop reason: HOSPADM

## 2023-05-23 RX ORDER — HYDROMORPHONE HCL/PF 1 MG/ML
1 SYRINGE (ML) INJECTION ONCE
Status: COMPLETED | OUTPATIENT
Start: 2023-05-23 | End: 2023-05-23

## 2023-05-23 RX ORDER — POTASSIUM CHLORIDE 29.8 MG/ML
40 INJECTION INTRAVENOUS EVERY 8 HOURS
Status: DISCONTINUED | OUTPATIENT
Start: 2023-05-23 | End: 2023-05-23

## 2023-05-23 RX ORDER — POTASSIUM CHLORIDE 14.9 MG/ML
20 INJECTION INTRAVENOUS
Status: DISPENSED | OUTPATIENT
Start: 2023-05-23 | End: 2023-05-23

## 2023-05-23 RX ORDER — ACETAMINOPHEN 325 MG/1
650 TABLET ORAL EVERY 6 HOURS PRN
Status: DISCONTINUED | OUTPATIENT
Start: 2023-05-23 | End: 2023-05-26 | Stop reason: HOSPADM

## 2023-05-23 RX ORDER — CALCIUM GLUCONATE 20 MG/ML
1 INJECTION, SOLUTION INTRAVENOUS ONCE
Status: COMPLETED | OUTPATIENT
Start: 2023-05-23 | End: 2023-05-23

## 2023-05-23 RX ORDER — ESCITALOPRAM OXALATE 10 MG/1
20 TABLET ORAL
Status: DISCONTINUED | OUTPATIENT
Start: 2023-05-23 | End: 2023-05-26 | Stop reason: HOSPADM

## 2023-05-23 RX ORDER — ONDANSETRON 2 MG/ML
4 INJECTION INTRAMUSCULAR; INTRAVENOUS EVERY 6 HOURS PRN
Status: DISCONTINUED | OUTPATIENT
Start: 2023-05-23 | End: 2023-05-26 | Stop reason: HOSPADM

## 2023-05-23 RX ORDER — ONDANSETRON 2 MG/ML
4 INJECTION INTRAMUSCULAR; INTRAVENOUS EVERY 8 HOURS PRN
Status: DISCONTINUED | OUTPATIENT
Start: 2023-05-23 | End: 2023-05-23

## 2023-05-23 RX ORDER — CYANOCOBALAMIN 1000 UG/ML
1000 INJECTION, SOLUTION INTRAMUSCULAR; SUBCUTANEOUS
Status: DISCONTINUED | OUTPATIENT
Start: 2023-06-01 | End: 2023-05-26 | Stop reason: HOSPADM

## 2023-05-23 RX ORDER — ONDANSETRON 2 MG/ML
4 INJECTION INTRAMUSCULAR; INTRAVENOUS ONCE
Status: COMPLETED | OUTPATIENT
Start: 2023-05-23 | End: 2023-05-23

## 2023-05-23 RX ADMIN — AMITRIPTYLINE HYDROCHLORIDE 20 MG: 10 TABLET, FILM COATED ORAL at 21:09

## 2023-05-23 RX ADMIN — CALCIUM GLUCONATE 1 G: 20 INJECTION, SOLUTION INTRAVENOUS at 20:14

## 2023-05-23 RX ADMIN — POTASSIUM CHLORIDE 80 MEQ: 2 INJECTION, SOLUTION, CONCENTRATE INTRAVENOUS at 21:09

## 2023-05-23 RX ADMIN — HYDROMORPHONE HYDROCHLORIDE 1 MG: 1 INJECTION, SOLUTION INTRAMUSCULAR; INTRAVENOUS; SUBCUTANEOUS at 14:22

## 2023-05-23 RX ADMIN — SODIUM CHLORIDE 125 ML/HR: 0.9 INJECTION, SOLUTION INTRAVENOUS at 19:37

## 2023-05-23 RX ADMIN — ESCITALOPRAM OXALATE 20 MG: 10 TABLET ORAL at 21:09

## 2023-05-23 RX ADMIN — SODIUM CHLORIDE 1000 ML: 0.9 INJECTION, SOLUTION INTRAVENOUS at 14:22

## 2023-05-23 RX ADMIN — POTASSIUM CHLORIDE 20 MEQ: 14.9 INJECTION, SOLUTION INTRAVENOUS at 15:28

## 2023-05-23 RX ADMIN — IOHEXOL 100 ML: 350 INJECTION, SOLUTION INTRAVENOUS at 15:10

## 2023-05-23 RX ADMIN — DICYCLOMINE HYDROCHLORIDE 20 MG: 20 TABLET ORAL at 20:14

## 2023-05-23 RX ADMIN — HYDROMORPHONE HYDROCHLORIDE 1 MG: 1 INJECTION, SOLUTION INTRAMUSCULAR; INTRAVENOUS; SUBCUTANEOUS at 15:18

## 2023-05-23 RX ADMIN — ONDANSETRON 4 MG: 2 INJECTION INTRAMUSCULAR; INTRAVENOUS at 14:24

## 2023-05-23 NOTE — ASSESSMENT & PLAN NOTE
· Presents with decreased po intake and left upper quadrant/flank pain with electrolyte abnormalities in setting of acute pancreatitis, enterocolitis, mild intussusception  · Monitor electrolytes and replete accordingly  · CT A/P showin  Findings suggesting mild enterocolitis in the appropriate clinical setting  2  At least 2 areas of mild intussusception, including at the jejunojejunostomy, without evidence of obstruction, likely transient    · Supportive care for now with IVF hydration, anti-emetics, pain control, NPO

## 2023-05-23 NOTE — ASSESSMENT & PLAN NOTE
· K on admission 2 9 in setting of poor po intake secondary to abdominal pain  · Will monitor and replete accordingly  · Will also keep patient on her daily dose of 80 meq via port  · Monitor on telemetry for now

## 2023-05-23 NOTE — ASSESSMENT & PLAN NOTE
· K on admission 2 9 in setting of poor po intake secondary to abdominal pain  · Will monitor and replete accordingly  · Monitor on telemetry for now

## 2023-05-23 NOTE — CONSULTS
Consultation - General Surgery   Joe Layton 43 y o  female MRN: [id number]  Unit/Bed#: -01 Encounter: [id number]    Assessment/Plan     Assessment:  79-year-old female who presents due to left lower quadrant abdominal pain which she states is consistent with her previous discomfort from kidney stones  CT scan was completed with IV contrast which shows findings of mild intussusception of 2 areas of the small bowel  This is currently asymptomatic  The patient is recently status post robotic assisted laparoscopic lysis of adhesions and pexy of the JJ anastomosis in October 2022 at [facility]     The patient's lipase is also elevated however, she has no complaints of epigastric abdominal pain  Plan:  No intervention is necessary from a surgical standpoint  Would consider urology evaluation  The patient is also requesting a nutritional evaluation  From a surgical standpoint, she may have a diet as tolerated  We will begin with clear liquids this evening, pending GI evaluation in the morning  Since there is no intervention necessary from a surgical standpoint, surgery will sign off, please call if any further questions or concerns    History of Present Illness     HPI:  Joe Layton is a 43 y o  female who presents with left lower quadrant abdominal pain radiating to her back  The patient states this pain is similar to when she had kidney stones in the past   The patient is known to me for history of chronic abdominal pain and hemorrhoids  After I last saw the patient about 1 year ago, she was referred to bariatric surgery  The patient did undergo robotic assisted lysis of adhesions and pexy of the JJ anastomosis in October 2022  From an abdominal pain standpoint, the patient has been doing well since this procedure  Most of her issues since then she states have been due to kidney stones  She did have a cystoscopy completed in January 2023  Currently, she denies any nausea or vomiting  " She does have occasional mild diarrhea which is normal for her       Consults    Review of Systems   Constitutional: Negative  HENT: Negative  Respiratory: Negative  Cardiovascular: Negative  Gastrointestinal: Positive for abdominal pain  Negative for nausea  Genitourinary: Negative  Musculoskeletal: Negative  Skin: Negative  Neurological: Negative  Hematological: Negative          Historical Information   Past Medical History:   Diagnosis Date   • C  difficile colitis 2016   • COVID-19     2022- pt denies long covid   • DUB (dysfunctional uterine bleeding)    • H  pylori infection    • Hypertension    • Hypokalemia    • Kidney stone 2023   • Left lower quadrant abdominal pain 2020   • Migraine    • Psychiatric disorder     Anxiety   • Rectal bleeding    • Renal disorder    • Rhabdomyolysis    • Thrombosed external hemorrhoid      Past Surgical History:   Procedure Laterality Date   • ABDOMINAL SURGERY     • APPENDECTOMY     •  SECTION     • CHOLECYSTECTOMY     • COSMETIC SURGERY      \"tummy tuck\"   • FL GUIDED CENTRAL VENOUS ACCESS DEVICE INSERTION  2018   • FL RETROGRADE PYELOGRAM  1/3/2023   • GASTRIC BYPASS     • HYSTERECTOMY     • LAPAROSCOPY     • NJ ANRCT XM SURG REQ ANES GENERAL SPI/EDRL DX N/A 2021    Procedure: ANAL EXAM UNDER ANESTHESIA; HEMORRHOIDECTOMY;  Surgeon: Janine Paredes DO;  Location: [facility];  Service: General   • NJ CYSTO/URETERO W/LITHOTRIPSY &INDWELL STENT INSRT Left 1/3/2023    Procedure: CYSTOSCOPY URETEROSCOPY WITH RETROGRADE PYELOGRAM, BASKET STONE EXTRACTION AND INSERTION STENT URETERAL;  Surgeon: Milady Haywood MD;  Location: [facility];  Service: Urology   • NJ EXC THROMBOSED HEMORRHOID Vanesa Crum N/A 2022    Procedure: EXCISION OF THROMBOSED EXTERNAL HEMORRHOID, Excision of perianal skin tag, dranage of perianal abscess, anal fistulatomy;  Surgeon: Janine Paredes DO;  Location: [facility];  " Service: General   • TUNNELED VENOUS PORT PLACEMENT N/A 12/21/2018    Procedure: INSERTION VENOUS PORT (PORT-A-CATH); Surgeon: Wilene Heimlich, DO;  Location: [facility];  Service: General     Social History   Social History     Substance and Sexual Activity   Alcohol Use Yes    Comment: rare     Social History     Substance and Sexual Activity   Drug Use Yes   • Types: Marijuana    Comment: Medical marijuana     E-Cigarette/Vaping   • E-Cigarette Use Never User      E-Cigarette/Vaping Substances   • Nicotine No    • THC No    • CBD No    • Flavoring No    • Other No    • Unknown No      Social History     Tobacco Use   Smoking Status Never   Smokeless Tobacco Never     Family History: non-contributory    Meds/Allergies   all current active meds have been reviewed  Allergies   Allergen Reactions   • Nsaids Other (See Comments)     Other reaction(s): Other (Please comment)  Should not take s/p bariatric surgery  Other reaction(s):  Other (See Comments)  Should not take as per prior records  Should not take s/p bariatric surgery  Has hx of gastric bypass     • Prednisone      Nausea, vomiting, diarrhea       Objective   First Vitals:   Blood Pressure: 128/62 (05/23/23 1341)  Pulse: 85 (05/23/23 1341)  Temperature: (!) 97 1 °F (36 2 °C) (05/23/23 1341)  Temp Source: Temporal (05/23/23 1753)  Respirations: 18 (05/23/23 1341)  Weight - Scale: 51 9 kg (114 lb 6 7 oz) (05/23/23 1341)  SpO2: 100 % (05/23/23 1341)    Current Vitals:   Blood Pressure: 112/64 (05/23/23 1753)  Pulse: 74 (05/23/23 1753)  Temperature: 97 6 °F (36 4 °C) (05/23/23 1753)  Temp Source: Temporal (05/23/23 1753)  Respirations: 18 (05/23/23 1753)  Weight - Scale: 51 9 kg (114 lb 6 7 oz) (05/23/23 1341)  SpO2: 97 % (05/23/23 1753)      Intake/Output Summary (Last 24 hours) at 5/23/2023 1851  Last data filed at 5/23/2023 1717  Gross per 24 hour   Intake 1100 ml   Output --   Net 1100 ml       Invasive Devices     Central Venous Catheter Line  Duration Port A Cath Right Chest -- days          Peripheral Intravenous Line  Duration           Peripheral IV 05/23/23 Right Antecubital <1 day                Physical Exam  Constitutional:       Appearance: Normal appearance  HENT:      Head: Normocephalic and atraumatic  Mouth/Throat:      Mouth: Mucous membranes are moist    Cardiovascular:      Rate and Rhythm: Normal rate and regular rhythm  Pulmonary:      Effort: Pulmonary effort is normal    Abdominal:      General: There is no distension  Palpations: Abdomen is soft  Tenderness: There is no abdominal tenderness  There is no guarding  Hernia: No hernia is present  Musculoskeletal:         General: Normal range of motion  Skin:     General: Skin is warm and dry  Neurological:      General: No focal deficit present  Mental Status: She is alert  Lab Results:   CBC:   Lab Results   Component Value Date    WBC 7 69 05/23/2023    HGB 10 6 (L) 05/23/2023    HCT 34 4 (L) 05/23/2023    MCV 84 05/23/2023     05/23/2023    MCH 25 9 (L) 05/23/2023    MCHC 30 8 (L) 05/23/2023    RDW 15 0 05/23/2023    MPV 10 8 05/23/2023    NRBC 0 05/23/2023   , CMP:   Lab Results   Component Value Date    SODIUM 135 05/23/2023    K 2 7 (LL) 05/23/2023     05/23/2023    CO2 19 (L) 05/23/2023    BUN 11 05/23/2023    CREATININE 1 02 05/23/2023    CALCIUM 8 3 (L) 05/23/2023    AST 18 05/23/2023    ALT 14 05/23/2023    ALKPHOS 46 05/23/2023    EGFR 69 05/23/2023   , Lipase:   Lab Results   Component Value Date    LIPASE 779 (H) 05/23/2023     Imaging: ct abd/pelvis:   FINDINGS:     ABDOMEN     LOWER CHEST:  No clinically significant abnormality identified in the visualized lower chest      LIVER/BILIARY TREE:  Unremarkable      GALLBLADDER: Gallbladder is surgically absent      SPLEEN:  Unremarkable      PANCREAS:  Unremarkable      ADRENAL GLANDS:  Unremarkable      KIDNEYS/URETERS:  Unremarkable   No hydronephrosis      STOMACH AND BOWEL: Liquid stool throughout the colon with mild mucosal hyperemia  Postsurgical changes of Roshan-en-Y gastric bypass without evidence of obstruction  Scattered nondilated fluid-filled loops of small bowel  At least 2 areas of mild   intussusception, including at the jejunojejunostomy (series 2 image 67) and more distally (series 2 image 95), without evidence of upstream obstruction, likely transient      APPENDIX:  No findings to suggest appendicitis      ABDOMINOPELVIC CAVITY:  No ascites  No pneumoperitoneum  No lymphadenopathy      VESSELS:  Unremarkable for patient's age      PELVIS     REPRODUCTIVE ORGANS: Surgical changes of prior hysterectomy      URINARY BLADDER:  Unremarkable      ABDOMINAL WALL/INGUINAL REGIONS:  Unremarkable      OSSEOUS STRUCTURES:  No acute fracture or destructive osseous lesion      IMPRESSION:     1  Findings suggesting mild enterocolitis in the appropriate clinical setting      2  At least 2 areas of mild intussusception, including at the jejunojejunostomy, without evidence of obstruction, likely transient         EKG, Pathology, and Other Studies: I have personally reviewed pertinent reports  Counseling / Coordination of Care  Total floor / unit time spent today 30 minutes  Greater than 50% of total time was spent with the patient and / or family counseling and / or coordination of care  A description of the counseling / coordination of care: Counseling the patient on current condition

## 2023-05-23 NOTE — ED PROVIDER NOTES
"History  Chief Complaint   Patient presents with   • Flank Pain     Started with left flank and back pain yesterday, pain is constant and increasing, also states frequency and burning with urination since yesterday, hx of kidney stones     Patient is a 39-year-old female presenting to the emergency department complaining of left flank pain that has been worsening over the past 2 days, she reports associated nausea, as well as dysuria and hematuria, she has a history of kidney stones with similar pain in the past, denies fever, denies diarrhea, no sick contacts          Prior to Admission Medications   Prescriptions Last Dose Informant Patient Reported? Taking? AMILoride 5 mg tablet 2023  No Yes   Sig: Take 1 tablet (5 mg total) by mouth daily   B Complex Vitamins (VITAMIN B COMPLEX PO)   Yes No   Sig: Take 1 capsule by mouth daily     Catheters MISC   No No   Sig: by Does not apply route 2 (two) times a week Port care per [facility], Disp, (SYRINGE 3CC/25GX5/8\") 25G X \" 3 ML MISC 2023  No Yes   Sig: Use once monthly for B12 injection     amitriptyline (ELAVIL) 10 mg tablet 2023  No Yes   Sig: Take 2 tablets (20 mg total) by mouth daily at bedtime   cyanocobalamin 1,000 mcg/mL 2023  No Yes   Sig: Inject 1 mL (1,000 mcg total) into a muscle every 30 (thirty) days Next dose due 2023   ergocalciferol (ERGOCALCIFEROL) 1 25 MG (90845 UT) capsule 2023  No Yes   Sig: Take 1 capsule (50,000 Units total) by mouth once a week  and    escitalopram (LEXAPRO) 20 mg tablet 2023  Yes Yes   Si mg daily at bedtime   ondansetron (ZOFRAN) 4 mg tablet 2023  No Yes   Sig: Take 1 tablet (4 mg total) by mouth every 8 (eight) hours as needed for nausea or vomiting   potassium chloride 0 4 mEq/mL 2023  No Yes   Sig: Inject 200 mL (80 mEq total) into a catheter in a vein daily 80 meq in 1 litre bag to be infused daily over 8 hours      Continue as ordered " "prior to admission   tamsulosin (FLOMAX) 0 4 mg   No No   Sig: TAKE ONE CAPSULE BY MOUTH IN THE MORNING      Facility-Administered Medications: None       Past Medical History:   Diagnosis Date   • C  difficile colitis 2016   • COVID-19     2022- pt denies long covid   • DUB (dysfunctional uterine bleeding)    • H  pylori infection    • Hypertension    • Hypokalemia    • Kidney stone 2023   • Left lower quadrant abdominal pain 2020   • Migraine    • Psychiatric disorder     Anxiety   • Rectal bleeding    • Renal disorder    • Rhabdomyolysis    • Thrombosed external hemorrhoid        Past Surgical History:   Procedure Laterality Date   • ABDOMINAL SURGERY     • APPENDECTOMY     •  SECTION     • CHOLECYSTECTOMY     • COSMETIC SURGERY      \"tummy tuck\"   • FL GUIDED CENTRAL VENOUS ACCESS DEVICE INSERTION  2018   • FL RETROGRADE PYELOGRAM  1/3/2023   • GASTRIC BYPASS     • HYSTERECTOMY     • LAPAROSCOPY     • TN ANRCT XM SURG REQ ANES GENERAL SPI/EDRL DX N/A 2021    Procedure: ANAL EXAM UNDER ANESTHESIA; HEMORRHOIDECTOMY;  Surgeon: Ramin Norman DO;  Location: [facility];  Service: General   • TN CYSTO/URETERO W/LITHOTRIPSY &INDWELL STENT INSRT Left 1/3/2023    Procedure: CYSTOSCOPY URETEROSCOPY WITH RETROGRADE PYELOGRAM, BASKET STONE EXTRACTION AND INSERTION STENT URETERAL;  Surgeon: Pedro Pablo Henry MD;  Location: [facility];  Service: Urology   • TN EXC THROMBOSED HEMORRHOID Jordan Dhillon N/A 2022    Procedure: EXCISION OF THROMBOSED EXTERNAL HEMORRHOID, Excision of perianal skin tag, dranage of perianal abscess, anal fistulatomy;  Surgeon: Ramin Norman DO;  Location: [facility];  Service: General   • TUNNELED VENOUS PORT PLACEMENT N/A 2018    Procedure: INSERTION VENOUS PORT (PORT-A-CATH);   Surgeon: Tamanna Lugo DO;  Location: [facility];  Service: General       Family History   Problem Relation Age of Onset   • No Known Problems Mother    • " Hypertension Father    • Diabetes Father    • Urolithiasis Father    • Prostate cancer Father    • Diabetes Maternal Grandfather      I have reviewed and agree with the history as documented  E-Cigarette/Vaping   • E-Cigarette Use Never User      E-Cigarette/Vaping Substances   • Nicotine No    • THC No    • CBD No    • Flavoring No    • Other No    • Unknown No      Social History     Tobacco Use   • Smoking status: Never   • Smokeless tobacco: Never   Vaping Use   • Vaping Use: Never used   Substance Use Topics   • Alcohol use: Yes     Comment: rare   • Drug use: Yes     Types: Marijuana     Comment: Medical marijuana       Review of Systems   Constitutional: Negative  HENT: Negative  Eyes: Negative  Respiratory: Negative  Cardiovascular: Negative  Gastrointestinal: Positive for nausea  Endocrine: Negative  Genitourinary: Positive for dysuria, flank pain, frequency and hematuria  Skin: Negative  Allergic/Immunologic: Negative  Neurological: Negative  Hematological: Negative  Psychiatric/Behavioral: Negative  Physical Exam  Physical Exam  Constitutional:       Appearance: She is well-developed  Comments: Appears to be in pain   HENT:      Head: Normocephalic and atraumatic  Eyes:      Conjunctiva/sclera: Conjunctivae normal       Pupils: Pupils are equal, round, and reactive to light  Cardiovascular:      Rate and Rhythm: Normal rate  Pulmonary:      Effort: Pulmonary effort is normal    Abdominal:      Palpations: Abdomen is soft  Musculoskeletal:         General: Normal range of motion  Cervical back: Normal range of motion and neck supple  Skin:     General: Skin is warm and dry  Neurological:      Mental Status: She is alert and oriented to person, place, and time           Vital Signs  ED Triage Vitals   Temperature Pulse Respirations Blood Pressure SpO2   05/23/23 1341 05/23/23 1341 05/23/23 1341 05/23/23 1341 05/23/23 1341   (!) 97 1 °F (36 2 °C) 85 18 128/62 100 %      Temp Source Heart Rate Source Patient Position - Orthostatic VS BP Location FiO2 (%)   05/23/23 1753 05/23/23 1341 05/23/23 1341 05/23/23 1341 --   Temporal Monitor Lying Left arm       Pain Score       05/23/23 1422       10 - Worst Possible Pain           Vitals:    05/23/23 1341 05/23/23 1753   BP: 128/62 112/64   Pulse: 85 74   Patient Position - Orthostatic VS: Lying Lying         Visual Acuity      ED Medications  Medications   potassium chloride 20 mEq IVPB (premix) (0 mEq Intravenous Stopped 5/23/23 1717)   AMILoride tablet 5 mg (has no administration in time range)   amitriptyline (ELAVIL) tablet 20 mg (has no administration in time range)   cyanocobalamin injection 1,000 mcg (has no administration in time range)   ergocalciferol (VITAMIN D2) capsule 50,000 Units (has no administration in time range)   escitalopram (LEXAPRO) tablet 20 mg (has no administration in time range)   sodium chloride 0 9 % infusion (has no administration in time range)   acetaminophen (TYLENOL) tablet 650 mg (has no administration in time range)   ondansetron (ZOFRAN) injection 4 mg (has no administration in time range)   enoxaparin (LOVENOX) subcutaneous injection 40 mg (has no administration in time range)   calcium gluconate 1 g in sodium chloride 0 9% 50 mL (premix) (has no administration in time range)   potassium chloride 80 mEq in sodium chloride 0 9 % 100 mL IVPB (has no administration in time range)   dicyclomine (BENTYL) tablet 20 mg (has no administration in time range)   sodium chloride 0 9 % bolus 1,000 mL (0 mL Intravenous Stopped 5/23/23 1705)   ondansetron (ZOFRAN) injection 4 mg (4 mg Intravenous Given 5/23/23 1424)   HYDROmorphone (DILAUDID) injection 1 mg (1 mg Intravenous Given 5/23/23 1422)   HYDROmorphone (DILAUDID) injection 1 mg (1 mg Intravenous Given 5/23/23 1518)   iohexol (OMNIPAQUE) 350 MG/ML injection (SINGLE-DOSE) 100 mL (100 mL Intravenous Given 5/23/23 1510) Diagnostic Studies  Results Reviewed     Procedure Component Value Units Date/Time    Basic metabolic panel [[id number]]     Lab Status: No result Specimen: Blood     Magnesium [[id number]]  (Normal) Collected: 05/23/23 1410    Lab Status: Final result Specimen: Blood from Arm, Right Updated: 05/23/23 1508     Magnesium 2 1 mg/dL     Lipase [[id number]]  (Abnormal) Collected: 05/23/23 1410    Lab Status: Final result Specimen: Blood from Arm, Right Updated: 05/23/23 1452     Lipase 779 u/L     Comprehensive metabolic panel [[id number]]  (Abnormal) Collected: 05/23/23 1410    Lab Status: Final result Specimen: Blood from Arm, Right Updated: 05/23/23 1446     Sodium 135 mmol/L      Potassium 2 7 mmol/L      Chloride 107 mmol/L      CO2 19 mmol/L      ANION GAP 9 mmol/L      BUN 11 mg/dL      Creatinine 1 02 mg/dL      Glucose 84 mg/dL      Calcium 8 3 mg/dL      AST 18 U/L      ALT 14 U/L      Alkaline Phosphatase 46 U/L      Total Protein 6 9 g/dL      Albumin 3 9 g/dL      Total Bilirubin 0 29 mg/dL      eGFR 69 ml/min/1 73sq m     Narrative:      [place] guidelines for Chronic Kidney Disease (CKD):   •  Stage 1 with normal or high GFR (GFR > 90 mL/min/1 73 square meters)  •  Stage 2 Mild CKD (GFR = 60-89 mL/min/1 73 square meters)  •  Stage 3A Moderate CKD (GFR = 45-59 mL/min/1 73 square meters)  •  Stage 3B Moderate CKD (GFR = 30-44 mL/min/1 73 square meters)  •  Stage 4 Severe CKD (GFR = 15-29 mL/min/1 73 square meters)  •  Stage 5 End Stage CKD (GFR <15 mL/min/1 73 square meters)  Note: GFR calculation is accurate only with a steady state creatinine    Urine Microscopic [[id number]]  (Normal) Collected: 05/23/23 1412    Lab Status: Final result Specimen: Urine, Other Updated: 05/23/23 1440     RBC, UA 0-1 /hpf      WBC, UA 0-1 /hpf      Epithelial Cells Occasional /hpf      Bacteria, UA Occasional /hpf     UA w Reflex to Microscopic w Reflex to Culture [[id number]]  (Abnormal) Collected: 05/23/23 1412    Lab Status: Final result Specimen: Urine, Other Updated: 05/23/23 1428     Color, UA Yellow     Clarity, UA Clear     Specific Gravity, UA >=1 030     pH, UA 6 0     Leukocytes, UA Negative     Nitrite, UA Negative     Protein, UA 30 (1+) mg/dl      Glucose, UA Negative mg/dl      Ketones, UA Negative mg/dl      Urobilinogen, UA 0 2 E U /dl      Bilirubin, UA Negative     Occult Blood, UA Negative    CBC and differential [[id number]]  (Abnormal) Collected: 05/23/23 1410    Lab Status: Final result Specimen: Blood from Arm, Right Updated: 05/23/23 1419     WBC 7 69 Thousand/uL      RBC 4 10 Million/uL      Hemoglobin 10 6 g/dL      Hematocrit 34 4 %      MCV 84 fL      MCH 25 9 pg      MCHC 30 8 g/dL      RDW 15 0 %      MPV 10 8 fL      Platelets 037 Thousands/uL      nRBC 0 /100 WBCs      Neutrophils Relative 65 %      Immat GRANS % 0 %      Lymphocytes Relative 26 %      Monocytes Relative 7 %      Eosinophils Relative 1 %      Basophils Relative 1 %      Neutrophils Absolute 5 05 Thousands/µL      Immature Grans Absolute 0 03 Thousand/uL      Lymphocytes Absolute 1 96 Thousands/µL      Monocytes Absolute 0 52 Thousand/µL      Eosinophils Absolute 0 06 Thousand/µL      Basophils Absolute 0 07 Thousands/µL     POCT pregnancy, urine [[id number]]  (Normal) Resulted: 05/23/23 1413    Lab Status: Final result Updated: 05/23/23 1413     EXT Preg Test, Ur Negative     Control Valid                 CT abdomen pelvis with contrast   Final Result by Marin Montoya MD (05/23 1556)      1  Findings suggesting mild enterocolitis in the appropriate clinical setting  2  At least 2 areas of mild intussusception, including at the jejunojejunostomy, without evidence of obstruction, likely transient              Workstation performed: [id number]                    Procedures  Procedures         ED Course  ED Course as of 05/23/23 1938   Tue May 23, 2023   1932 Urine Microscopic   1932 Lipase(!) 1932 Comprehensive metabolic panel(!!)   6810 POCT pregnancy, urine   1932 UA w Reflex to Microscopic w Reflex to Culture(!)   1932 CBC and differential(!)   1932 CT abdomen pelvis with contrast                               SBIRT 20yo+    Flowsheet Row Most Recent Value   Initial Alcohol Screen: US AUDIT-C     1  How often do you have a drink containing alcohol? 1 Filed at: 05/23/2023 1406   2  How many drinks containing alcohol do you have on a typical day you are drinking? 0 Filed at: 05/23/2023 1406   3a  Male UNDER 65: How often do you have five or more drinks on one occasion? 0 Filed at: 05/23/2023 1406   3b  FEMALE Any Age, or MALE 65+: How often do you have 4 or more drinks on one occassion? 0 Filed at: 05/23/2023 1406   Audit-C Score 1 Filed at: 05/23/2023 1406   SLIME: How many times in the past year have you    Used an illegal drug or used a prescription medication for non-medical reasons? Never Filed at: 05/23/2023 1406                    Medical Decision Making  Abdominal exam without peritoneal signs  No evidence of acute abdomen at this time  Appears uncomfortable but in no acute distress  Work-up consistent with acute pancreatitis with elevated lipase  Given work-up, low suspicion for acute hepatobiliary disease (including acute cholecystitis or cholangitis),, PUD (including gastric perforation), acute infectious processes (pneumonia, hepatitis, pyelonephritis), acute appendicitis, vascular catastrophe, bowel obstruction, viscus perforation, testicular torsion, or diverticulitis  Presentation not consistent with other acute emergent causes of abdominal pain at this time    CT scan shows evidence of mild intussusception, likely transient, laboratory findings consistent with hypokalemia and dehydration, admission advised, patient in agreement, discussed with hospitalist service who are in agreement as well    Acute pancreatitis without infection or necrosis, unspecified pancreatitis type: acute illness or injury  Dehydration: acute illness or injury  Enterocolitis: acute illness or injury  Hypokalemia: acute illness or injury  Intussusception [facility]): acute illness or injury  Amount and/or Complexity of Data Reviewed  External Data Reviewed: labs, radiology and notes  Labs: ordered  Decision-making details documented in ED Course  Radiology: ordered  Decision-making details documented in ED Course  Risk  Prescription drug management  Decision regarding hospitalization            Disposition  Final diagnoses:   Hypokalemia   Acute pancreatitis without infection or necrosis, unspecified pancreatitis type   Dehydration   Enterocolitis   Intussusception ([facility] [address] )     Time reflects when diagnosis was documented in both MDM as applicable and the Disposition within this note     Time User Action Codes Description Comment    5/23/2023  4:52 PM Brett Sinha Add [E87 6] Hypokalemia     5/23/2023  4:52 PM Brett Sinha Add [K85 90] Acute pancreatitis without infection or necrosis, unspecified pancreatitis type     5/23/2023  4:52 PM Brett Hopperh Add [E86 0] Dehydration     5/23/2023  5:15 PM Brewer-Teotico, Nuala Baldy Samantha Add [K85 00] Idiopathic acute pancreatitis without infection or necrosis     5/23/2023  5:15 PM Brewer-Teotico, Nuala Baldy Samantha Remove [K85 00] Idiopathic acute pancreatitis without infection or necrosis     5/23/2023  6:59 PM Vergie Eng Add [K52 9] Enterocolitis     5/23/2023  6:59 PM Vergie Eng Add [K56 1] Intussusception [facility])       ED Disposition     ED Disposition   Admit    Condition   Stable    Date/Time   Tue May 23, 2023  4:51 PM    Comment   Case was discussed with Dr Jason Levy and the patient's admission status was agreed to be Admission Status: inpatient status to the service of Dr Jason Levy             Follow-up Information    None         Current Discharge Medication List      CONTINUE these medications which have NOT CHANGED "Details   AMILoride 5 mg tablet Take 1 tablet (5 mg total) by mouth daily  Qty: 30 tablet, Refills: 0    Associated Diagnoses: Hypokalemia      amitriptyline (ELAVIL) 10 mg tablet Take 2 tablets (20 mg total) by mouth daily at bedtime  Qty: 60 tablet, Refills: 0    Associated Diagnoses: Migraine      cyanocobalamin 1,000 mcg/mL Inject 1 mL (1,000 mcg total) into a muscle every 30 (thirty) days Next dose due 5/1/2023    Associated Diagnoses: B12 deficiency      ergocalciferol (ERGOCALCIFEROL) 1 25 MG (64940 UT) capsule Take 1 capsule (50,000 Units total) by mouth once a week Mondays and Thursdays    Associated Diagnoses: Vitamin D insufficiency      escitalopram (LEXAPRO) 20 mg tablet 20 mg daily at bedtime      ondansetron (ZOFRAN) 4 mg tablet Take 1 tablet (4 mg total) by mouth every 8 (eight) hours as needed for nausea or vomiting  Qty: 20 tablet, Refills: 0    Associated Diagnoses: Intractable vomiting      potassium chloride 0 4 mEq/mL Inject 200 mL (80 mEq total) into a catheter in a vein daily 80 meq in 1 litre bag to be infused daily over 8 hours      Continue as ordered prior to admission  Qty: 2800 mL, Refills: 0    Associated Diagnoses: Hypokalemia      Syringe/Needle, Disp, (SYRINGE 3CC/25GX5/8\") 25G X 5/8\" 3 ML MISC Use once monthly for B12 injection  Qty: 1 each, Refills: 11    Associated Diagnoses: B12 deficiency      B Complex Vitamins (VITAMIN B COMPLEX PO) Take 1 capsule by mouth daily        Catheters MISC by Does not apply route 2 (two) times a week Port care per [facility] protocol  Qty: 999 each, Refills: 11    Associated Diagnoses: Hypokalemia      tamsulosin (FLOMAX) 0 4 mg TAKE ONE CAPSULE BY MOUTH IN THE MORNING  Qty: 30 capsule, Refills: 0    Associated Diagnoses: Nephrolithiasis             No discharge procedures on file      PDMP Review       Value Time User    PDMP Reviewed  Yes 1/2/2023  9:52  [facility], [address] [place] [address] Provider  Electronically Signed by           Eve Dumont [id number] MUK[address] Salazar DO  05/23/23 1938

## 2023-05-23 NOTE — ASSESSMENT & PLAN NOTE
"· Ct A/P showing \"Liquid stool throughout the colon with mild mucosal hyperemia  Postsurgical changes of Roshan-en-Y gastric bypass without evidence of obstruction  [place] [facility] [place] Findings suggesting mild enterocolitis in the appropriate clinical setting  \"  · Supportive care for now  · IVF hydration, pain control  · Anti-emetics  · Stool studies ordered  · GI consulted on the case  "

## 2023-05-23 NOTE — ASSESSMENT & PLAN NOTE
· Presents with abdominal pain, nausea, elevated lipase  · CT without evidence of acute pancreatitis  · Supportive care for now  · NPO, IVF hydration, prn pain control  · Lipase in am

## 2023-05-23 NOTE — H&P
"[address]  H&P  Name: Eustace Snellen 43 y o  female I MRN: [id number]  Unit/Bed#: -01 I Date of Admission: 2023   Date of Service: 2023 I Hospital Day: 0      Assessment/Plan   * Left upper quadrant abdominal pain  Assessment & Plan  · Presents with decreased po intake and left upper quadrant/flank pain with electrolyte abnormalities in setting of acute pancreatitis, enterocolitis, mild intussusception  · Monitor electrolytes and replete accordingly  · CT A/P showin  Findings suggesting mild enterocolitis in the appropriate clinical setting  2  At least 2 areas of mild intussusception, including at the jejunojejunostomy, without evidence of obstruction, likely transient  · Supportive care for now with IVF hydration, anti-emetics, pain control, gradual diet advancement, clears for now as per surgery    Hypokalemia  Assessment & Plan  · K on admission 2 9 in setting of poor po intake secondary to abdominal pain  · Will monitor and replete accordingly  · Will also administer patient's 80 meq of K via port TID  · Monitor on telemetry for now    Intussusception [facility])  Assessment & Plan  · CT A/P showing: \"Scattered nondilated fluid-filled loops of small bowel  At least 2 areas of mild intussusception, including at the jejunojejunostomy (series 2 image 67) and more distally (series 2 image 95), without evidence of upstream obstruction, likely transient  \"  · General surgery consulted on the case  · Supportive care with IVF, clear liquid diet for now as per gen surg  · Pain control prn    Enterocolitis  Assessment & Plan  · Ct A/P showing \"Liquid stool throughout the colon with mild mucosal hyperemia  Postsurgical changes of Roshan-en-Y gastric bypass without evidence of obstruction  [place] [facility] [facility] Findings suggesting mild enterocolitis in the appropriate clinical setting  \"  · Supportive care for now  · IVF hydration, pain control  · Anti-emetics  · Stool studies ordered  · GI consulted on " the case    Idiopathic acute pancreatitis without infection or necrosis  Assessment & Plan  · Presents with abdominal pain, nausea, elevated lipase  · CT without evidence of acute pancreatitis  · Supportive care for now  · Gradual advancement of diet, IVF hydration, prn pain control  · Lipase in am    Hypocalcemia  Assessment & Plan  · Calcium low at 8 1 in setting of decreased p o  intake secondary to intractable abdominal pain  · 1 g calcium gluc ordered  · Continue to monitor and replete accordingly  · Monitor on telemetry for now    History of gastric bypass  Assessment & Plan  · Continue with supportive care  Outpatient bariatric surgery follow-up  VTE Pharmacologic Prophylaxis:   Moderate Risk (Score 3-4) - Pharmacological DVT Prophylaxis Ordered: enoxaparin (Lovenox)  Code Status: Level 1 - Full Code   Discussion with family: patient    Anticipated Length of Stay: Patient will be admitted on an inpatient basis with an anticipated length of stay of greater than 2 midnights secondary to acute pancreatitis, enterocolitis, electrolyte derangements  Total Time Spent on Date of Encounter in care of patient: 65 minutes This time was spent on one or more of the following: performing physical exam; counseling and coordination of care; obtaining or reviewing history; documenting in the medical record; reviewing/ordering tests, medications or procedures; communicating with other healthcare professionals and discussing with patient's family/caregivers  Chief Complaint: left upper quadrant abdominal pain    History of Present Illness:  Hermila Valle is a 43 y o  female with a PMH of gastric bypass surgery who presents with left upper quadrant abdominal pain with nausea and diarrhea with decreased po intake that had started 1 day PTA  Also noted burning on urination that had progressively gotten worse  Patient has not been eating over the past 2 days due to symptoms  No fevers, chills   No blood in "stools  Review of Systems:  Review of Systems   Constitutional: Negative for chills and fever  HENT: Negative for ear pain and sore throat  Eyes: Negative for pain and visual disturbance  Respiratory: Negative for cough and shortness of breath  Cardiovascular: Negative for chest pain and palpitations  Gastrointestinal: Positive for abdominal pain, nausea and vomiting  Genitourinary: Negative for dysuria and hematuria  Musculoskeletal: Negative for arthralgias and back pain  Skin: Negative for color change and rash  Neurological: Negative for seizures and syncope  All other systems reviewed and are negative           Past Medical and Surgical History:   Past Medical History:   Diagnosis Date   • C  difficile colitis 2016   • COVID-19     2022- pt denies long covid   • DUB (dysfunctional uterine bleeding)    • H  pylori infection    • Hypertension    • Hypokalemia    • Kidney stone 2023   • Left lower quadrant abdominal pain 2020   • Migraine    • Psychiatric disorder     Anxiety   • Rectal bleeding    • Renal disorder    • Rhabdomyolysis    • Thrombosed external hemorrhoid        Past Surgical History:   Procedure Laterality Date   • ABDOMINAL SURGERY     • APPENDECTOMY     •  SECTION     • CHOLECYSTECTOMY     • COSMETIC SURGERY      \"tummy tuck\"   • FL GUIDED CENTRAL VENOUS ACCESS DEVICE INSERTION  2018   • FL RETROGRADE PYELOGRAM  1/3/2023   • GASTRIC BYPASS     • HYSTERECTOMY     • LAPAROSCOPY     • MS ANRCT XM SURG REQ ANES GENERAL SPI/EDRL DX N/A 2021    Procedure: ANAL EXAM UNDER ANESTHESIA; HEMORRHOIDECTOMY;  Surgeon: Irwin Brand DO;  Location:  MAIN OR;  Service: General   • MS CYSTO/URETERO W/LITHOTRIPSY &INDWELL STENT INSRT Left 1/3/2023    Procedure: CYSTOSCOPY URETEROSCOPY WITH RETROGRADE PYELOGRAM, BASKET STONE EXTRACTION AND INSERTION STENT URETERAL;  Surgeon: Pari Green MD;  Location:  MAIN OR;  Service: Urology   • MS " "EXC THROMBOSED HEMORRHOID XTRNL N/A 7/8/2022    Procedure: EXCISION OF THROMBOSED EXTERNAL HEMORRHOID, Excision of perianal skin tag, dranage of perianal abscess, anal fistulatomy;  Surgeon: Irwin Brand DO;  Location:  MAIN OR;  Service: General   • TUNNELED VENOUS PORT PLACEMENT N/A 12/21/2018    Procedure: INSERTION VENOUS PORT (PORT-A-CATH); Surgeon: Donn Ahuja DO;  Location: [facility];  Service: General       Meds/Allergies:  Prior to Admission medications    Medication Sig Start Date End Date Taking? Authorizing Provider   AMILoride 5 mg tablet Take 1 tablet (5 mg total) by mouth daily 2/20/19   Alexandro Carr DO   amitriptyline (ELAVIL) 10 mg tablet Take 2 tablets (20 mg total) by mouth daily at bedtime 5/1/22   Handy Bhatia DO   B Complex Vitamins (VITAMIN B COMPLEX PO) Take 1 capsule by mouth daily      Historical Provider, MD   Catheters MISC by Does not apply route 2 (two) times a week Port care per [place] protocol 5/7/20   Cheli Urban DO   cyanocobalamin 1,000 mcg/mL Inject 1 mL (1,000 mcg total) into a muscle every 30 (thirty) days Next dose due 5/1/2023 4/18/23   Boris Varma MD   ergocalciferol (ERGOCALCIFEROL) 1 25 MG (90069 UT) capsule Take 1 capsule (50,000 Units total) by mouth once a week Mondays and Thursdays 4/18/23   Boris Varma MD   escitalopram (LEXAPRO) 20 mg tablet 20 mg daily at bedtime 3/14/23   Historical Provider, MD   ondansetron (ZOFRAN) 4 mg tablet Take 1 tablet (4 mg total) by mouth every 8 (eight) hours as needed for nausea or vomiting 3/6/22   Deon Jiménez MD   potassium chloride 0 4 mEq/mL Inject 200 mL (80 mEq total) into a catheter in a vein daily 80 meq in 1 litre bag to be infused daily over 8 hours      Continue as ordered prior to admission 11/29/22   ARIADNA Pa   Syringe/Needle, Disp, (SYRINGE 3CC/25GX5/8\") 25G X 5/8\" 3 ML MISC Use once monthly for B12 injection   6/1/20   Cheli Urban DO " tamsulosin (FLOMAX) 0 4 mg TAKE ONE CAPSULE BY MOUTH IN THE MORNING 2/9/23   Tiffany Muniz DO     I have reviewed home medications with patient personally  Allergies: Allergies   Allergen Reactions   • Nsaids Other (See Comments)     Other reaction(s): Other (Please comment)  Should not take s/p bariatric surgery  Other reaction(s): Other (See Comments)  Should not take as per prior records  Should not take s/p bariatric surgery  Has hx of gastric bypass     • Prednisone      Nausea, vomiting, diarrhea       Social History:  Marital Status: /Civil Union   Patient Pre-hospital Living Situation: Home  Patient Pre-hospital Level of Mobility: walks  Patient Pre-hospital Diet Restrictions: none  Substance Use History:   Social History     Substance and Sexual Activity   Alcohol Use Yes    Comment: rare     Social History     Tobacco Use   Smoking Status Never   Smokeless Tobacco Never     Social History     Substance and Sexual Activity   Drug Use Yes   • Types: Marijuana    Comment: Medical marijuana       Family History:  Family History   Problem Relation Age of Onset   • No Known Problems Mother    • Hypertension Father    • Diabetes Father    • Urolithiasis Father    • Prostate cancer Father    • Diabetes Maternal Grandfather        Physical Exam:     Vitals:   Blood Pressure: 112/64 (05/23/23 1753)  Pulse: 74 (05/23/23 1753)  Temperature: 97 6 °F (36 4 °C) (05/23/23 1753)  Temp Source: Temporal (05/23/23 1753)  Respirations: 18 (05/23/23 1753)  Weight - Scale: 51 9 kg (114 lb 6 7 oz) (05/23/23 1341)  SpO2: 97 % (05/23/23 1753)    Physical Exam  Vitals and nursing note reviewed  Constitutional:       General: She is not in acute distress  Appearance: She is well-developed  HENT:      Head: Normocephalic and atraumatic  Eyes:      Conjunctiva/sclera: Conjunctivae normal    Cardiovascular:      Rate and Rhythm: Normal rate and regular rhythm  Heart sounds: No murmur heard    Pulmonary: Effort: Pulmonary effort is normal  No respiratory distress  Breath sounds: Normal breath sounds  Abdominal:      Palpations: Abdomen is soft  Tenderness: There is no abdominal tenderness  Musculoskeletal:         General: No swelling  Cervical back: Neck supple  Skin:     General: Skin is warm and dry  Capillary Refill: Capillary refill takes less than 2 seconds  Neurological:      General: No focal deficit present  Mental Status: She is alert and oriented to person, place, and time  Psychiatric:         Mood and Affect: Mood normal             Additional Data:     Lab Results:  Results from last 7 days   Lab Units 05/23/23  1410   WBC Thousand/uL 7 69   HEMOGLOBIN g/dL 10 6*   HEMATOCRIT % 34 4*   PLATELETS Thousands/uL 353   NEUTROS PCT % 65   LYMPHS PCT % 26   MONOS PCT % 7   EOS PCT % 1     Results from last 7 days   Lab Units 05/23/23  1410   SODIUM mmol/L 135   POTASSIUM mmol/L 2 7*   CHLORIDE mmol/L 107   CO2 mmol/L 19*   BUN mg/dL 11   CREATININE mg/dL 1 02   ANION GAP mmol/L 9   CALCIUM mg/dL 8 3*   ALBUMIN g/dL 3 9   TOTAL BILIRUBIN mg/dL 0 29   ALK PHOS U/L 46   ALT U/L 14   AST U/L 18   GLUCOSE RANDOM mg/dL 84                       Lines/Drains:  Invasive Devices     Central Venous Catheter Line  Duration           Port A Cath Right Chest -- days          Peripheral Intravenous Line  Duration           Peripheral IV 05/23/23 Right Antecubital <1 day                Central Line:  Goal for removal: N/A - Chronic PICC           Imaging: Reviewed radiology reports from this admission including: abdominal/pelvic CT  CT abdomen pelvis with contrast   Final Result by Yanira Lagunas MD (05/23 9876)      1  Findings suggesting mild enterocolitis in the appropriate clinical setting  2  At least 2 areas of mild intussusception, including at the jejunojejunostomy, without evidence of obstruction, likely transient              Workstation performed: [id number] EKG and Other Studies Reviewed on Admission:   · EKG: pending  ** Please Note: This note has been constructed using a voice recognition system   **

## 2023-05-23 NOTE — ASSESSMENT & PLAN NOTE
"· CT A/P showing: \"Scattered nondilated fluid-filled loops of small bowel  At least 2 areas of mild intussusception, including at the jejunojejunostomy (series 2 image 67) and more distally (series 2 image 95), without evidence of upstream obstruction, likely transient  \"  · General surgery consulted on the case  · Supportive care with IVF, bowel rest and NPO status for now  · Pain control prn  "

## 2023-05-23 NOTE — ASSESSMENT & PLAN NOTE
· Calcium low at 8 1 in setting of decreased p o  intake secondary to intractable abdominal pain  · 1 g calcium gluc ordered  · Continue to monitor and replete accordingly  · Monitor on telemetry for now

## 2023-05-24 LAB
ALBUMIN SERPL BCP-MCNC: 3.1 G/DL (ref 3.5–5)
ALP SERPL-CCNC: 42 U/L (ref 34–104)
ALT SERPL W P-5'-P-CCNC: 9 U/L (ref 7–52)
ANION GAP SERPL CALCULATED.3IONS-SCNC: 6 MMOL/L (ref 4–13)
AST SERPL W P-5'-P-CCNC: 18 U/L (ref 13–39)
BASOPHILS # BLD AUTO: 0.05 THOUSANDS/ÂΜL (ref 0–0.1)
BASOPHILS NFR BLD AUTO: 1 % (ref 0–1)
BILIRUB SERPL-MCNC: 0.37 MG/DL (ref 0.2–1)
BUN SERPL-MCNC: 9 MG/DL (ref 5–25)
C DIFF TOX GENS STL QL NAA+PROBE: NEGATIVE
CALCIUM ALBUM COR SERPL-MCNC: 8.5 MG/DL (ref 8.3–10.1)
CALCIUM SERPL-MCNC: 7.8 MG/DL (ref 8.4–10.2)
CAMPYLOBACTER DNA SPEC NAA+PROBE: NORMAL
CHLORIDE SERPL-SCNC: 116 MMOL/L (ref 96–108)
CO2 SERPL-SCNC: 14 MMOL/L (ref 21–32)
CREAT SERPL-MCNC: 0.91 MG/DL (ref 0.6–1.3)
EOSINOPHIL # BLD AUTO: 0.08 THOUSAND/ÂΜL (ref 0–0.61)
EOSINOPHIL NFR BLD AUTO: 1 % (ref 0–6)
ERYTHROCYTE [DISTWIDTH] IN BLOOD BY AUTOMATED COUNT: 15.2 % (ref 11.6–15.1)
GFR SERPL CREATININE-BSD FRML MDRD: 79 ML/MIN/1.73SQ M
GLUCOSE SERPL-MCNC: 77 MG/DL (ref 65–140)
HCT VFR BLD AUTO: 31.3 % (ref 34.8–46.1)
HGB BLD-MCNC: 9.5 G/DL (ref 11.5–15.4)
IMM GRANULOCYTES # BLD AUTO: 0.02 THOUSAND/UL (ref 0–0.2)
IMM GRANULOCYTES NFR BLD AUTO: 0 % (ref 0–2)
LIPASE SERPL-CCNC: 311 U/L (ref 11–82)
LYMPHOCYTES # BLD AUTO: 1.47 THOUSANDS/ÂΜL (ref 0.6–4.47)
LYMPHOCYTES NFR BLD AUTO: 22 % (ref 14–44)
MAGNESIUM SERPL-MCNC: 2.1 MG/DL (ref 1.9–2.7)
MCH RBC QN AUTO: 25.7 PG (ref 26.8–34.3)
MCHC RBC AUTO-ENTMCNC: 30.4 G/DL (ref 31.4–37.4)
MCV RBC AUTO: 85 FL (ref 82–98)
MONOCYTES # BLD AUTO: 0.6 THOUSAND/ÂΜL (ref 0.17–1.22)
MONOCYTES NFR BLD AUTO: 9 % (ref 4–12)
NEUTROPHILS # BLD AUTO: 4.42 THOUSANDS/ÂΜL (ref 1.85–7.62)
NEUTS SEG NFR BLD AUTO: 67 % (ref 43–75)
NRBC BLD AUTO-RTO: 0 /100 WBCS
PLATELET # BLD AUTO: 281 THOUSANDS/UL (ref 149–390)
PMV BLD AUTO: 10.1 FL (ref 8.9–12.7)
POTASSIUM SERPL-SCNC: 4.4 MMOL/L (ref 3.5–5.3)
PROT SERPL-MCNC: 5.7 G/DL (ref 6.4–8.4)
RBC # BLD AUTO: 3.69 MILLION/UL (ref 3.81–5.12)
SALMONELLA DNA SPEC QL NAA+PROBE: NORMAL
SHIGA TOXIN STX GENE SPEC NAA+PROBE: NORMAL
SHIGELLA DNA SPEC QL NAA+PROBE: NORMAL
SODIUM SERPL-SCNC: 136 MMOL/L (ref 135–147)
WBC # BLD AUTO: 6.64 THOUSAND/UL (ref 4.31–10.16)

## 2023-05-24 RX ORDER — POTASSIUM CHLORIDE 29.8 MG/ML
80 INJECTION INTRAVENOUS DAILY
Status: DISCONTINUED | OUTPATIENT
Start: 2023-05-25 | End: 2023-05-26 | Stop reason: HOSPADM

## 2023-05-24 RX ADMIN — SODIUM CHLORIDE 125 ML/HR: 0.9 INJECTION, SOLUTION INTRAVENOUS at 11:29

## 2023-05-24 RX ADMIN — DICYCLOMINE HYDROCHLORIDE 20 MG: 20 TABLET ORAL at 07:35

## 2023-05-24 RX ADMIN — AMILORIDE HYDROCLORIDE 5 MG: 5 TABLET ORAL at 08:17

## 2023-05-24 RX ADMIN — AMITRIPTYLINE HYDROCHLORIDE 20 MG: 10 TABLET, FILM COATED ORAL at 21:06

## 2023-05-24 RX ADMIN — SODIUM CHLORIDE 125 ML/HR: 0.9 INJECTION, SOLUTION INTRAVENOUS at 19:49

## 2023-05-24 RX ADMIN — POTASSIUM CHLORIDE 80 MEQ: 2 INJECTION, SOLUTION, CONCENTRATE INTRAVENOUS at 04:47

## 2023-05-24 RX ADMIN — ESCITALOPRAM OXALATE 20 MG: 10 TABLET ORAL at 21:07

## 2023-05-24 RX ADMIN — ONDANSETRON 4 MG: 2 INJECTION INTRAMUSCULAR; INTRAVENOUS at 12:52

## 2023-05-24 NOTE — ASSESSMENT & PLAN NOTE
· Presents with decreased po intake and left upper quadrant/flank pain with electrolyte abnormalities in setting of acute pancreatitis, enterocolitis, mild intussusception  · Monitor electrolytes and replete accordingly  · CT A/P showin  Findings suggesting mild enterocolitis in the appropriate clinical setting  2  At least 2 areas of mild intussusception, including at the jejunojejunostomy, without evidence of obstruction, likely transient  · Supportive care for now with IVF hydration, anti-emetics, pain control  · Gradually advancing diet  Patient still very nauseated on clear liquid diet  · No surgical intervention at this time as per Gen surg

## 2023-05-24 NOTE — PROGRESS NOTES
"[address]  Progress Note  Name: Nakul Onofre  MRN: [id number]  Unit/Bed#: -03 I Date of Admission: 2023   Date of Service: 2023 I Hospital Day: 1    Assessment/Plan   * Left upper quadrant abdominal pain  Assessment & Plan  · Presents with decreased po intake and left upper quadrant/flank pain with electrolyte abnormalities in setting of acute pancreatitis, enterocolitis, mild intussusception  · Monitor electrolytes and replete accordingly  · CT A/P showin  Findings suggesting mild enterocolitis in the appropriate clinical setting  2  At least 2 areas of mild intussusception, including at the jejunojejunostomy, without evidence of obstruction, likely transient  · Supportive care for now with IVF hydration, anti-emetics, pain control  · Gradually advancing diet  Patient still very nauseated on clear liquid diet  · No surgical intervention at this time as per Gen surg  Hypokalemia  Assessment & Plan  · K on admission 2 9 in setting of poor po intake secondary to abdominal pain  · Will monitor and replete accordingly  · Will also keep patient on her daily dose of 80 meq via port  · Monitor on telemetry for now    Intussusception [facility])  Assessment & Plan  · CT A/P showing: \"Scattered nondilated fluid-filled loops of small bowel  At least 2 areas of mild intussusception, including at the jejunojejunostomy (series 2 image 67) and more distally (series 2 image 95), without evidence of upstream obstruction, likely transient  \"  · General surgery consulted on the case  No surgical intervention at this time  Re-consult with questions  · Supportive care with IVF, gradual advancement of diet  · Pain control prn    Enterocolitis  Assessment & Plan  · Ct A/P showing \"Liquid stool throughout the colon with mild mucosal hyperemia  Postsurgical changes of Roshan-en-Y gastric bypass without evidence of obstruction  [facility] Nap[place] Karvonen Findings suggesting mild enterocolitis in the appropriate clinical " "setting  \"  · Supportive care for now  · IVF hydration, pain control  · Anti-emetics  · Stool studies ordered  · GI consulted on the case    Idiopathic acute pancreatitis without infection or necrosis  Assessment & Plan  · Presents with abdominal pain, nausea, elevated lipase  · CT without evidence of acute pancreatitis  · Supportive care for now  · Gradual advancement of diet, IVF hydration, prn pain control  · Lipase improved    Hypocalcemia  Assessment & Plan  · Calcium low at 8 1 in setting of decreased p o  intake secondary to intractable abdominal pain  · 1 g calcium gluc ordered  · Continue to monitor and replete accordingly  · Monitor on telemetry for now  · Corrected calcium wnl    History of gastric bypass  Assessment & Plan  · Continue with supportive care  Outpatient bariatric surgery follow-up  VTE Pharmacologic Prophylaxis:   Moderate Risk (Score 3-4) - Pharmacological DVT Prophylaxis Ordered: enoxaparin (Lovenox)  Patient Centered Rounds: I performed bedside rounds with nursing staff today  Discussions with Specialists or Other Care Team Provider: Gen surgery    Education and Discussions with Family / Patient: patient  Total Time Spent on Date of Encounter in care of patient: 45 minutes This time was spent on one or more of the following: performing physical exam; counseling and coordination of care; obtaining or reviewing history; documenting in the medical record; reviewing/ordering tests, medications or procedures; communicating with other healthcare professionals and discussing with patient's family/caregivers  Current Length of Stay: 1 day(s)  Current Patient Status: Inpatient   Certification Statement: The patient will continue to require additional inpatient hospital stay due to electrolyte optimization, gradual advancement of diet  Discharge Plan: Anticipate discharge in 24-48 hrs to home      Code Status: Level 1 - Full Code    Subjective:   Patient still complains of " nausea and diarrhea    Objective:     Vitals:   Temp (24hrs), Av 9 °F (36 6 °C), Min:97 5 °F (36 4 °C), Max:98 4 °F (36 9 °C)    Temp:  [97 5 °F (36 4 °C)-98 4 °F (36 9 °C)] 98 4 °F (36 9 °C)  HR:  [66-83] 83  Resp:  [17-20] 18  BP: ()/(62-91) 130/91  SpO2:  [97 %-100 %] 100 %  Body mass index is 19 04 kg/m²  Input and Output Summary (last 24 hours): Intake/Output Summary (Last 24 hours) at 2023 1509  Last data filed at 2023 1406  Gross per 24 hour   Intake 1420 ml   Output --   Net 1420 ml       Physical Exam:   Physical Exam  Vitals and nursing note reviewed  Constitutional:       General: She is not in acute distress  Appearance: She is well-developed  Comments: Weak-appearing   HENT:      Head: Normocephalic and atraumatic  Eyes:      Conjunctiva/sclera: Conjunctivae normal    Cardiovascular:      Rate and Rhythm: Normal rate and regular rhythm  Heart sounds: No murmur heard  Pulmonary:      Effort: Pulmonary effort is normal  No respiratory distress  Breath sounds: Normal breath sounds  Abdominal:      Palpations: Abdomen is soft  Tenderness: There is abdominal tenderness  Musculoskeletal:         General: No swelling  Cervical back: Neck supple  Skin:     General: Skin is warm and dry  Capillary Refill: Capillary refill takes less than 2 seconds  Neurological:      Mental Status: She is alert     Psychiatric:         Mood and Affect: Mood normal             Additional Data:     Labs:  Results from last 7 days   Lab Units 23  0538   EOS PCT % 1   HEMATOCRIT % 31 3*   HEMOGLOBIN g/dL 9 5*   LYMPHS PCT % 22   MONOS PCT % 9   NEUTROS PCT % 67   PLATELETS Thousands/uL 281   WBC Thousand/uL 6 64     Results from last 7 days   Lab Units 23  0438   ANION GAP mmol/L 6   ALBUMIN g/dL 3 1*   ALK PHOS U/L 42   ALT U/L 9   AST U/L 18   BUN mg/dL 9   CALCIUM mg/dL 7 8*   CHLORIDE mmol/L 116*   CO2 mmol/L 14*   CREATININE mg/dL 0 91 GLUCOSE RANDOM mg/dL 77   POTASSIUM mmol/L 4 4   SODIUM mmol/L 136   TOTAL BILIRUBIN mg/dL 0 37                       Lines/Drains:  Invasive Devices     Central Venous Catheter Line  Duration           Port A Cath Right Chest -- days          Peripheral Intravenous Line  Duration           Peripheral IV 05/23/23 Right Antecubital 1 day                Central Line:  Goal for removal: N/A - Chronic PICC         Imaging: No pertinent imaging reviewed  Recent Cultures (last 7 days):   Results from last 7 days   Lab Units 05/24/23  0538   C DIFF TOXIN B BY PCR  Negative       Last 24 Hours Medication List:   Current Facility-Administered Medications   Medication Dose Route Frequency Provider Last Rate   • acetaminophen  650 mg Oral Q6H PRN Harris Stoddard MD     • AMILoride  5 mg Oral Daily Libra Bueno MD     • amitriptyline  20 mg Oral HS Libra Bueno MD     • [START ON 6/1/2023] cyanocobalamin  1,000 mcg Intramuscular Q30 Days Libra Bueno MD     • dicyclomine  20 mg Oral 4x Daily PRN Michael Jiménez MD     • enoxaparin  40 mg Subcutaneous Daily Libra Bueno MD     • [START ON 5/25/2023] ergocalciferol  50,000 Units Oral Weekly Libra Bueno MD     • escitalopram  20 mg Oral HS Michael Jiménez MD     • ondansetron  4 mg Intravenous Q6H PRN Libra Bueno MD     • [START ON 5/25/2023] potassium chloride  80 mEq Intravenous Daily Libra Bueno MD     • sodium chloride  125 mL/hr Intravenous Continuous Libra Bueno  mL/hr (05/24/23 1129)        Today, Patient Was Seen By: Harirs Stoddard MD    **Please Note: This note may have been constructed using a voice recognition system  **

## 2023-05-24 NOTE — CONSULTS
"Consult received, pt requesting nutrition recommendations  Pt with hx RYGB, says she has been having ongoing undesirable weight loss/unable to gain weight  Reports that she has issues with abdominal pain, n/v regardless of food type, \"One day I can tolerate the food, another day I can't  \" Reports drinking mostly water + one gingerale daily  [place], eats eggs from her chickens, homemade breads, lots of fresh fruits/vegetables like melons, bananas, spinach  Says she receives IVF at home everyday  Pt primarily concerned with hx of kidney stones and inquiring diet recs for this, uncertain stone type  Chart review of weight hx: 5/10/22 125lb, 10/20/22 111lb, 11/28/22 115lb, 4/16/23 110lb, 5/23/23 114lb  Pt reports she has lost > 200lb since RYGB, feels her comfortable weight is ~140lb though unable to maintain her weight recently  Pt reportedly does not take a vitamin C supplement  Provided pt with kidney stones nutrition therapy handout, pt considering decreasing intake of high oxalate foods which she reports eating regularly such as spinach, to consider other high potassium foods such as melons/banana she is already eating, tomatoes, fresh potatoes, etc  Pt plans to discuss this more with nephrology during next OP appointment which author agrees with as unsure of stone type/not being evaluated for this currently at [address] [place] [address] [place]  Initiated discussion of low fat diet with pt due to pancreatitis such as low fat dairy products, lean meats  Will order prosource mixed in apple juice and cranberry juice BID to promote adequate PRO intake while on clear liquids diet, ensure clear not appropriate given RYGB/concern for dumping syndrome  Advance diet as medically appropriate gradually to low fat diet, pending GI evaluation     "

## 2023-05-24 NOTE — ASSESSMENT & PLAN NOTE
· Presents with abdominal pain, nausea, elevated lipase  · CT without evidence of acute pancreatitis  · Supportive care for now  · Gradual advancement of diet, IVF hydration, prn pain control  · Lipase improved

## 2023-05-24 NOTE — CONSULTS
Consultation - [facility]– [place]5 [facility]  Ade Worley 43 y o  female MRN: [id number]  Unit/Bed#: -01 Encounter: [id number]        Inpatient consult to gastroenterology  Consult performed by: ARIADNA Vasquez  Consult ordered by: Junior Spears MD          Reason for Consult / Principal Problem:    Mild intussusception without evidence of obstruction   mild internal colitis   Recurrent acute pancreatitis   chronic diarrhea   abdominal pain   history pancreatic insufficiency    ASSESSMENT AND PLAN:      Mild intussusception without evidence of obstruction   mild internal colitis   Recurrent acute pancreatitis   chronic diarrhea   abdominal pain   history pancreatic insufficiency    Recurrent acute pancreatitis  -elevated lipase on admission but denies any RUQ pain, nausea prior to admission      -status post cholecystectomy     -previous US and CT scan with no findings of chronic pancreatitis     -liver enzymes normal      -IgG4 negative-not concerning for autoimmune pancreatitis     -no evidence of hypercalcemia     -medications do not appear to be contributory     -evidence of pancreatic insufficiency in the past but currently not on enzyme supplementation for months     -recommend EGD with EUS for further evaluation of her pancreas as an outpatient with her primary GI team     -consider outpatient genetic testing given her young age and family history of pancreatitis although this likely will not change any management     -pain management as per primary team     -recommend aggressive IV fluids to promote decrease in BUN and hematocrit and maintain a urine output of at least 0 5 mL/kg per hour    -okay for clear liquid diet     mild intussusception without evidence of obstruction,  Which has been recurrent   chronic abdominal pain   status post Roshan-en-Y gastric bypass     -seen on CT scan yesterday     -felt to be likely transient     -seen and evaluated by surgery with no indication for surgical   Intervention     -symptoms continue to improve today     chronic diarrhea    - longstanding, with recent worsening reported by patient    - previous extensive workup with other GI providers    -previous workup by [facility] and during admission here 09/2022, maggie junior     -calprotectin previously elevated at 300s, she is s/p EGD/flex sig 10/2022 Dr Ana Rosa [facility] with normal EGD and colonoscopy, jejunal/gastric and random colon bxs negative     -Patient will need to follow up with her primary GI team as an outpatient at [facility] upon discharge      - previously was on pancreatic enzymes, but has not been on for over a year    - will send elastase, and replete with enzymes pending these results    - follow-up on C diff, enteric panel, ova and parasite    - may also be viral in nature,  Recommend supportive care    - if no obvious cause for patient's diarrhea Can consider cholestyramine as she is status past cholecystectomy     chronic abd pain  S/p RYGB      -previous workup by [facility], [facility], [facility]     -calprotectin elevated at 300s, she is s/p EGD/flex sig 10/2022 Dr Ana Rosa [facility] with normal EGD and colonoscopy, jejunal/gastric and random colon bxs negative     -  Continue Bentyl as needed,  Can change to scheduled     Patient will need to follow up with her primary GI team as an outpatient at [facility] upon discharge        [facility]  Gastroenterology    Patient plan of care formulated with Dr Stephy Murray     ______________________________________________________________________    HPI:       patient is a 23-year-old female with past medical history significant for chronic history of nausea, vomiting, diarrhea and persistent hypokalemia requiring continuous potassium infusions via a port dating back to at least 2017   She has a history of gastric bypass 2014 with weight loss of about 200 lb,  Who presents with pressure and left lower quadrant pain "since Monday, with worsening yesterday, that necessitated emergency room evaluation  Also reports she was having nausea at home prior to admission, and has also been noticing loose liquid stools of approximately 5-6 times per day, states she typically moves her bowels approximately 3 times per day  Denies any blood or melena  also notes that she started with pressure and burning with urination, and feeling that she was incomplete emptying of her bladder, thought she had another urinary tract infection or kidney stone  She does not smoke or drink alcohol,   Normally, but does admit to having 1 alcoholic drink yesterday as it was her anniversary  No new medications  She is status post cholecystectomy  Previous bouts of pancreatitis, with elevated lipase, and CT scan showing no evidence of acute pancreatitis on this admission  Was previously on pancreatic enzymes, but has not been for the past year      She has had extensive workup in the past with multiple GIs(Rory, NATALI, Tre Lara)  Her father has a history of pancreatitis  Denies family history of pancreatic cancer         She has persistent symptoms of N/V/D with concomitant hypokalemia dating back to at least 2017  She has had extensive workup including multiple imaging studies-CTs, SBFT, gastric emptying testing  CT with non-specific \"colitis\" findings but has had multiple endoscopic evaluations without etiology       With regards to her chronic diarrhea she has negative workup for celiac disease, NET, microscopic colitis,infections  She has severe pancreatic insufficiency(but unsure if stool specimen was appropriate)  She has a history of C diff in 2015   Last EGD 10/2022 Dr Dania Saenz [facility] WNL       Most recent colonoscopy October 2022 [facility] Dr Dania Saenz with nonspecific colon edema, biopsies negative        Has not followed up with GI since discharge from the hospital       REVIEW OF SYSTEMS:    Review of " "Systems   HENT: Negative for trouble swallowing  Gastrointestinal: Positive for abdominal pain, diarrhea and nausea  Negative for anal bleeding, blood in stool and vomiting  All other systems reviewed and are negative  Historical Information   Past Medical History:   Diagnosis Date   • C  difficile colitis 2016   • COVID-19     2022- pt denies long covid   • DUB (dysfunctional uterine bleeding)    • H  pylori infection    • Hypertension    • Hypokalemia    • Kidney stone 2023   • Left lower quadrant abdominal pain 2020   • Migraine    • Psychiatric disorder     Anxiety   • Rectal bleeding    • Renal disorder    • Rhabdomyolysis    • Thrombosed external hemorrhoid      Past Surgical History:   Procedure Laterality Date   • ABDOMINAL SURGERY     • APPENDECTOMY     •  SECTION     • CHOLECYSTECTOMY     • COSMETIC SURGERY      \"tummy tuck\"   • FL GUIDED CENTRAL VENOUS ACCESS DEVICE INSERTION  2018   • FL RETROGRADE PYELOGRAM  1/3/2023   • GASTRIC BYPASS     • HYSTERECTOMY     • LAPAROSCOPY     • KY ANRCT XM SURG REQ ANES GENERAL SPI/EDRL DX N/A 2021    Procedure: ANAL EXAM UNDER ANESTHESIA; HEMORRHOIDECTOMY;  Surgeon: Pa Hernandez DO;  Location: [facility];  Service: General   • KY CYSTO/URETERO W/LITHOTRIPSY &INDWELL STENT INSRT Left 1/3/2023    Procedure: CYSTOSCOPY URETEROSCOPY WITH RETROGRADE PYELOGRAM, BASKET STONE EXTRACTION AND INSERTION STENT URETERAL;  Surgeon: Jordon Lopez MD;  Location: [facility];  Service: Urology   • KY EXC THROMBOSED HEMORRHOID Julio Cesar Shepard N/A 2022    Procedure: EXCISION OF THROMBOSED EXTERNAL HEMORRHOID, Excision of perianal skin tag, dranage of perianal abscess, anal fistulatomy;  Surgeon: Pa Hernandez DO;  Location: [facility];  Service: General   • TUNNELED VENOUS PORT PLACEMENT N/A 2018    Procedure: INSERTION VENOUS PORT (PORT-A-CATH);   Surgeon: Kate Wheeler DO;  Location: [facility];  Service: " "General     Social History   Social History     Substance and Sexual Activity   Alcohol Use Yes    Comment: rare     Social History     Substance and Sexual Activity   Drug Use Yes   • Types: Marijuana    Comment: Medical marijuana     Social History     Tobacco Use   Smoking Status Never   Smokeless Tobacco Never     Family History   Problem Relation Age of Onset   • No Known Problems Mother    • Hypertension Father    • Diabetes Father    • Urolithiasis Father    • Prostate cancer Father    • Diabetes Maternal Grandfather        Meds/Allergies     Medications Prior to Admission   Medication   • AMILoride 5 mg tablet   • amitriptyline (ELAVIL) 10 mg tablet   • cyanocobalamin 1,000 mcg/mL   • ergocalciferol (ERGOCALCIFEROL) 1 25 MG (30051 UT) capsule   • escitalopram (LEXAPRO) 20 mg tablet   • ondansetron (ZOFRAN) 4 mg tablet   • potassium chloride 0 4 mEq/mL   • Syringe/Needle, Disp, (SYRINGE 3CC/25GX5/8\") 25G X 5/8\" 3 ML MISC   • B Complex Vitamins (VITAMIN B COMPLEX PO)   • Catheters MISC   • tamsulosin (FLOMAX) 0 4 mg     Current Facility-Administered Medications   Medication Dose Route Frequency   • acetaminophen (TYLENOL) tablet 650 mg  650 mg Oral Q6H PRN   • AMILoride tablet 5 mg  5 mg Oral Daily   • amitriptyline (ELAVIL) tablet 20 mg  20 mg Oral HS   • [START ON 6/1/2023] cyanocobalamin injection 1,000 mcg  1,000 mcg Intramuscular Q30 Days   • dicyclomine (BENTYL) tablet 20 mg  20 mg Oral 4x Daily PRN   • enoxaparin (LOVENOX) subcutaneous injection 40 mg  40 mg Subcutaneous Daily   • [START ON 5/25/2023] ergocalciferol (VITAMIN D2) capsule 50,000 Units  50,000 Units Oral Weekly   • escitalopram (LEXAPRO) tablet 20 mg  20 mg Oral HS   • ondansetron (ZOFRAN) injection 4 mg  4 mg Intravenous Q6H PRN   • [START ON 5/25/2023] potassium chloride 40 mEq IVPB (premix)  80 mEq Intravenous Daily   • sodium chloride 0 9 % infusion  125 mL/hr Intravenous Continuous       Allergies   Allergen Reactions   • Nsaids Other " "(See Comments)     Other reaction(s): Other (Please comment)  Should not take s/p bariatric surgery  Other reaction(s): Other (See Comments)  Should not take as per prior records  Should not take s/p bariatric surgery  Has hx of gastric bypass     • Prednisone      Nausea, vomiting, diarrhea           Objective     Blood pressure 130/91, pulse 83, temperature 98 4 °F (36 9 °C), resp  rate 18, height 5' 5\" (1 651 m), weight 51 9 kg (114 lb 6 7 oz), SpO2 100 %  Body mass index is 19 04 kg/m²  Intake/Output Summary (Last 24 hours) at 5/24/2023 1526  Last data filed at 5/24/2023 1406  Gross per 24 hour   Intake 1420 ml   Output --   Net 1420 ml         PHYSICAL EXAM:      Physical Exam  Vitals and nursing note reviewed  Constitutional:       General: She is not in acute distress  Appearance: Normal appearance  She is not ill-appearing, toxic-appearing or diaphoretic  HENT:      Head: Normocephalic and atraumatic  Mouth/Throat:      Mouth: Mucous membranes are moist       Pharynx: Oropharynx is clear  Eyes:      General: No scleral icterus  Conjunctiva/sclera: Conjunctivae normal    Cardiovascular:      Rate and Rhythm: Normal rate  Pulmonary:      Effort: Pulmonary effort is normal    Abdominal:      General: There is no distension  Palpations: Abdomen is soft  Tenderness: There is abdominal tenderness (mild LLQ tenderness )  There is no guarding  Skin:     General: Skin is warm and dry  Coloration: Skin is not jaundiced  Findings: No rash  Neurological:      General: No focal deficit present  Mental Status: She is alert and oriented to person, place, and time     Psychiatric:         Mood and Affect: Mood normal              Lab Results:   Admission on 05/23/2023   Component Date Value   • WBC 05/23/2023 7 69    • RBC 05/23/2023 4 10    • Hemoglobin 05/23/2023 10 6 (L)    • Hematocrit 05/23/2023 34 4 (L)    • MCV 05/23/2023 84    • MCH 05/23/2023 25 9 (L)    • MCHC " 05/23/2023 30 8 (L)    • RDW 05/23/2023 15 0    • MPV 05/23/2023 10 8    • Platelets 26/12/0881 353    • nRBC 05/23/2023 0    • Neutrophils Relative 05/23/2023 65    • Immat GRANS % 05/23/2023 0    • Lymphocytes Relative 05/23/2023 26    • Monocytes Relative 05/23/2023 7    • Eosinophils Relative 05/23/2023 1    • Basophils Relative 05/23/2023 1    • Neutrophils Absolute 05/23/2023 5 05    • Immature Grans Absolute 05/23/2023 0 03    • Lymphocytes Absolute 05/23/2023 1 96    • Monocytes Absolute 05/23/2023 0 52    • Eosinophils Absolute 05/23/2023 0 06    • Basophils Absolute 05/23/2023 0 07    • Sodium 05/23/2023 135    • Potassium 05/23/2023 2 7 (LL)    • Chloride 05/23/2023 107    • CO2 05/23/2023 19 (L)    • ANION GAP 05/23/2023 9    • BUN 05/23/2023 11    • Creatinine 05/23/2023 1 02    • Glucose 05/23/2023 84    • Calcium 05/23/2023 8 3 (L)    • AST 05/23/2023 18    • ALT 05/23/2023 14    • Alkaline Phosphatase 05/23/2023 46    • Total Protein 05/23/2023 6 9    • Albumin 05/23/2023 3 9    • Total Bilirubin 05/23/2023 0 29    • eGFR 05/23/2023 69    • Lipase 05/23/2023 779 (H)    • Color, UA 05/23/2023 Yellow    • Clarity, UA 05/23/2023 Clear    • Specific Gravity, UA 05/23/2023 >=1 030    • pH, UA 05/23/2023 6 0    • Leukocytes, UA 05/23/2023 Negative    • Nitrite, UA 05/23/2023 Negative    • Protein, UA 05/23/2023 30 (1+) (A)    • Glucose, UA 05/23/2023 Negative    • Ketones, UA 05/23/2023 Negative    • Urobilinogen, UA 05/23/2023 0 2    • Bilirubin, UA 05/23/2023 Negative    • Occult Blood, UA 05/23/2023 Negative    • EXT Preg Test, Ur 05/23/2023 Negative    • Control 05/23/2023 Valid    • RBC, UA 05/23/2023 0-1    • WBC, UA 05/23/2023 0-1    • Epithelial Cells 05/23/2023 Occasional    • Bacteria, UA 05/23/2023 Occasional    • Magnesium 05/23/2023 2 1    • Sodium 05/23/2023 135    • Potassium 05/23/2023 3 0 (L)    • Chloride 05/23/2023 110 (H)    • CO2 05/23/2023 18 (L)    • ANION GAP 05/23/2023 7    • BUN 05/23/2023 10    • Creatinine 05/23/2023 1 02    • Glucose 05/23/2023 96    • Calcium 05/23/2023 8 2 (L)    • eGFR 05/23/2023 69    • Platelets 06/96/1684 308    • MPV 05/23/2023 10 4    • Salmonella sp PCR 05/24/2023 None Detected    • Shigella sp/Enteroinvasi* 05/24/2023 None Detected    • Campylobacter sp (jejuni* 05/24/2023 None Detected    • Shiga toxin 1/Shiga toxi* 05/24/2023 None Detected    • C difficile toxin by PCR 05/24/2023 Negative    • Sodium 05/24/2023 136    • Potassium 05/24/2023 4 4    • Chloride 05/24/2023 116 (H)    • CO2 05/24/2023 14 (L)    • ANION GAP 05/24/2023 6    • BUN 05/24/2023 9    • Creatinine 05/24/2023 0 91    • Glucose 05/24/2023 77    • Calcium 05/24/2023 7 8 (L)    • Corrected Calcium 05/24/2023 8 5    • AST 05/24/2023 18    • ALT 05/24/2023 9    • Alkaline Phosphatase 05/24/2023 42    • Total Protein 05/24/2023 5 7 (L)    • Albumin 05/24/2023 3 1 (L)    • Total Bilirubin 05/24/2023 0 37    • eGFR 05/24/2023 79    • Magnesium 05/24/2023 2 1    • WBC 05/24/2023 6 64    • RBC 05/24/2023 3 69 (L)    • Hemoglobin 05/24/2023 9 5 (L)    • Hematocrit 05/24/2023 31 3 (L)    • MCV 05/24/2023 85    • MCH 05/24/2023 25 7 (L)    • MCHC 05/24/2023 30 4 (L)    • RDW 05/24/2023 15 2 (H)    • MPV 05/24/2023 10 1    • Platelets 95/23/6725 281    • nRBC 05/24/2023 0    • Neutrophils Relative 05/24/2023 67    • Immat GRANS % 05/24/2023 0    • Lymphocytes Relative 05/24/2023 22    • Monocytes Relative 05/24/2023 9    • Eosinophils Relative 05/24/2023 1    • Basophils Relative 05/24/2023 1    • Neutrophils Absolute 05/24/2023 4 42    • Immature Grans Absolute 05/24/2023 0 02    • Lymphocytes Absolute 05/24/2023 1 47    • Monocytes Absolute 05/24/2023 0 60    • Eosinophils Absolute 05/24/2023 0 08    • Basophils Absolute 05/24/2023 0 05    • Lipase 05/24/2023 311 (H)        Imaging Studies: I have personally reviewed pertinent imaging studies      CT abdomen pelvis with contrast 05/23/2023:  CT ABDOMEN AND PELVIS WITH IV CONTRAST     INDICATION:   Epigastric pain  abd pain      COMPARISON: CT abdomen/pelvis 3/28/2023      TECHNIQUE:  CT examination of the abdomen and pelvis was performed  Multiplanar 2D reformatted images were created from the source data      This examination, like all CT scans performed in the [facility], was performed utilizing techniques to minimize radiation dose exposure, including the use of iterative reconstruction and automated exposure control  Radiation dose length   product (DLP) for this visit:  572 mGy-cm     IV Contrast:  100 mL of iohexol (OMNIPAQUE)  Enteric Contrast:  Enteric contrast was not administered      FINDINGS:     ABDOMEN     LOWER CHEST:  No clinically significant abnormality identified in the visualized lower chest      LIVER/BILIARY TREE:  Unremarkable      GALLBLADDER: Gallbladder is surgically absent      SPLEEN:  Unremarkable      PANCREAS:  Unremarkable      ADRENAL GLANDS:  Unremarkable      KIDNEYS/URETERS:  Unremarkable  No hydronephrosis      STOMACH AND BOWEL: Liquid stool throughout the colon with mild mucosal hyperemia  Postsurgical changes of Roshan-en-Y gastric bypass without evidence of obstruction  Scattered nondilated fluid-filled loops of small bowel  At least 2 areas of mild   intussusception, including at the jejunojejunostomy (series 2 image 67) and more distally (series 2 image 95), without evidence of upstream obstruction, likely transient      APPENDIX:  No findings to suggest appendicitis      ABDOMINOPELVIC CAVITY:  No ascites  No pneumoperitoneum  No lymphadenopathy      VESSELS:  Unremarkable for patient's age      PELVIS     REPRODUCTIVE ORGANS: Surgical changes of prior hysterectomy      URINARY BLADDER:  Unremarkable      ABDOMINAL WALL/INGUINAL REGIONS:  Unremarkable      OSSEOUS STRUCTURES:  No acute fracture or destructive osseous lesion      IMPRESSION:     1   Findings suggesting mild enterocolitis in the appropriate clinical setting      2   At least 2 areas of mild intussusception, including at the jejunojejunostomy, without evidence of obstruction, likely transient

## 2023-05-24 NOTE — ASSESSMENT & PLAN NOTE
"· Ct A/P showing \"Liquid stool throughout the colon with mild mucosal hyperemia  Postsurgical changes of Roshan-en-Y gastric bypass without evidence of obstruction  Nj Álvarez Findings suggesting mild enterocolitis in the appropriate clinical setting  \"  · Supportive care for now  · IVF hydration, pain control  · Anti-emetics  · Stool studies ordered  · GI consulted on the case  "

## 2023-05-24 NOTE — ASSESSMENT & PLAN NOTE
"· CT A/P showing: \"Scattered nondilated fluid-filled loops of small bowel  At least 2 areas of mild intussusception, including at the jejunojejunostomy (series 2 image 67) and more distally (series 2 image 95), without evidence of upstream obstruction, likely transient  \"  · General surgery consulted on the case  No surgical intervention at this time   Re-consult with questions  · Supportive care with IVF, gradual advancement of diet  · Pain control prn  "

## 2023-05-24 NOTE — CASE MANAGEMENT
Case Management Discharge Planning Note    Patient name Elaine Half  Location /-18 MRN [id number]  : 1985 Date 2023       Current Admission Date: 2023  Current Admission Diagnosis:Left upper quadrant abdominal pain   Patient Active Problem List    Diagnosis Date Noted   • Enterocolitis 2023   • Intussusception ([address] ) 2023   • Leukocytosis 2023   • Left ureteral calculus 2023   • Idiopathic acute pancreatitis without infection or necrosis 2022   • Perianal abscess 2022   • Episode of shaking    • Psychogenic nonepileptic spells 2022   • History of anxiety 2022   • Intractable vomiting 2022   • Hyponatremia 2022   • Iron deficiency 2021   • Other hemorrhoids    • Open wound / skin tear of right chest wall 2021   • Cellulitis of chest wall 2021   • Chronic pancreatitis ([facility] [address] ) 2021   • Acute on chronic abdominal pain 2021   • Abnormal LFTs 2021   • Gas pain 2021   • Middle ear effusion 2020   • Normal anion gap metabolic acidosis    • Left upper quadrant abdominal pain 2019   • Vertigo 2019   • Stress fracture of right foot with routine healing 2019   • Right ovarian cyst 2019   • Chest pain 2019   • Other intestinal malabsorption 10/05/2018   • Gastric bypass status for obesity 10/02/2018   • GERD (gastroesophageal reflux disease) 2018   • Paresthesia of both lower extremities 08/15/2018   • Paresthesias 08/15/2018   • Fatigue 2018   • Elevated CPK 2018   • Vitamin B12 deficiency 2018   • Cervical lymphadenopathy 2018   • Chronic anemia 07/10/2018   • Vitamin D insufficiency 07/10/2018   • Hypocalcemia 2018   • Hypophosphatemia 2018   • Hypokalemia 2018   • History of gastric bypass 2018   • Migraine without aura and without status migrainosus, not intractable 2018   • Left-sided weakness 07/07/2018   • Transaminitis 07/07/2018   • B12 deficiency 01/17/2016   • WAGNER (obstructive sleep apnea) 06/11/2014   • Migraine 05/29/2014      LOS (days): 1  Geometric Mean LOS (GMLOS) (days): 4 60  Days to GMLOS:3 7     OBJECTIVE:  Risk of Unplanned Readmission Score: 25 66         Current admission status: Inpatient   Preferred Pharmacy:   [facility], [address] [address] [address] [employer] [address]  Phone: [phone] Fax: [phone] 901 [address] [address] [place] [address] Drive  Phone: [phone] Fax: [phone]    Primary Care Provider: Rajeev Gates MD    Primary Insurance: [address] [place] [address]  Secondary Insurance:     DISCHARGE DETAILS:        CM contacted [address] [place] [address] ([phone])  Nurse requested  fax order upon patients discharge indicating resumption of Infusion care prior to admission ([phone])

## 2023-05-24 NOTE — UTILIZATION REVIEW
NOTIFICATION OF INPATIENT ADMISSION   AUTHORIZATION REQUEST   SERVICING FACILITY:   [address] [place] [address]  Nay Padilla [address] [place] [address], [address]  Tax ID: [id number]  NPI: [id number] ATTENDING PROVIDER:  Attending Name and NPI#: Marleen Waller Funez [place], [place] [[id number]]  Address: [facility]  Nay Padilla [address] [place] [address], [address]  Phone: [phone]   ADMISSION INFORMATION:  Place of Service: Inpatient [address]  [address]  Place of Service Code: 21  Inpatient Admission Date/Time: 5/23/23  4:52 PM  Discharge Date/Time: No discharge date for patient encounter  Admitting Diagnosis Code/Description:  Dehydration [E86 0]  Hypokalemia [E87 6]  Flank pain [R10 9]  Acute pancreatitis without infection or necrosis, unspecified pancreatitis type [K85 90]     UTILIZATION REVIEW CONTACT:  [employer] Utilization   Network Utilization Review Department  Phone: [phone]  Fax [phone]  Email: [email or web address]  Contact for approvals/pending authorizations, clinical reviews, and discharge  PHYSICIAN ADVISORY SERVICES:  Medical Necessity Denial & Icvb-hm-Iftq Review  Phone: [phone]  Fax: [phone]  Email: [email or web address]

## 2023-05-24 NOTE — PLAN OF CARE
Problem: SAFETY ADULT  Goal: Maintain or return to baseline ADL function  Description: INTERVENTIONS:  -  Assess patient's ability to carry out ADLs; assess patient's baseline for ADL function and identify physical deficits which impact ability to perform ADLs (bathing, care of mouth/teeth, toileting, grooming, dressing, etc )  - Assess/evaluate cause of self-care deficits   - Assess range of motion  - Assess patient's mobility; develop plan if impaired  - Assess patient's need for assistive devices and provide as appropriate  - Encourage maximum independence but intervene and supervise when necessary  - Involve family in performance of ADLs  - Assess for home care needs following discharge   - Consider OT consult to assist with ADL evaluation and planning for discharge  - Provide patient education as appropriate  Outcome: Progressing     Problem: DISCHARGE PLANNING  Goal: Discharge to home or other facility with appropriate resources  Description: INTERVENTIONS:  - Identify barriers to discharge w/patient and caregiver  - Arrange for needed discharge resources and transportation as appropriate  - Identify discharge learning needs (meds, wound care, etc )  - Arrange for interpretive services to assist at discharge as needed  - Refer to Case Management Department for coordinating discharge planning if the patient needs post-hospital services based on physician/advanced practitioner order or complex needs related to functional status, cognitive ability, or social support system  Outcome: Progressing     Problem: Knowledge Deficit  Goal: Patient/family/caregiver demonstrates understanding of disease process, treatment plan, medications, and discharge instructions  Description: Complete learning assessment and assess knowledge base    Interventions:  - Provide teaching at level of understanding  - Provide teaching via preferred learning methods  Outcome: Progressing     Problem: GASTROINTESTINAL - ADULT  Goal: Minimal or absence of nausea and/or vomiting  Description: INTERVENTIONS:  - Administer IV fluids if ordered to ensure adequate hydration  - Maintain NPO status until nausea and vomiting are resolved  - Nasogastric tube if ordered  - Administer ordered antiemetic medications as needed  - Provide nonpharmacologic comfort measures as appropriate  - Advance diet as tolerated, if ordered  - Consider nutrition services referral to assist patient with adequate nutrition and appropriate food choices  Outcome: Progressing

## 2023-05-24 NOTE — UTILIZATION REVIEW
Initial Clinical Review    Admission: Date/Time/Statement:   Admission Orders (From admission, onward)     Ordered        23 1652  INPATIENT ADMISSION  Once                      Orders Placed This Encounter   Procedures   • INPATIENT ADMISSION     Standing Status:   Standing     Number of Occurrences:   1     Order Specific Question:   Level of Care     Answer:   Med Surg [16]     Order Specific Question:   Estimated length of stay     Answer:   More than 2 Midnights     Order Specific Question:   Certification     Answer:   I certify that inpatient services are medically necessary for this patient for a duration of greater than two midnights  See H&P and MD Progress Notes for additional information about the patient's course of treatment  ED Arrival Information     Expected   -    Arrival   2023 13:33    Acuity   Urgent            Means of arrival   Walk-In    Escorted by   Self    Service   Hospitalist    Admission type   Emergency            Arrival complaint   flank pain            Chief Complaint   Patient presents with   • Flank Pain     Started with left flank and back pain yesterday, pain is constant and increasing, also states frequency and burning with urination since yesterday, hx of kidney stones       Initial Presentation: 43 y o  female to ED via walk in from home  Present with a PMHX of gastric bypass surgery who presents with left upper quadrant abdominal pain with nausea and diarrhea with decreased po intake that had started 1 day PTA  Also noted burning on urination that had progressively gotten worse  Patient has not been eating over the past 2 days due to symptoms  Rec'd pain meds / Mehreen Boop in ED  Admitted to M/S with DX: Left upper quadrant abdominal pain  on exam: Appears to be in pain; Pain 8-10/10; nauseated; elevated lipase; electrolyte abnormalities - K 2 7 rec'd iv KCL x3 in ED   CT A/P showin  Findings suggesting mild enterocolitis in the appropriate clinical setting  2  At least 2 areas of mild intussusception, including at the jejunojejunostomy, without evidence of obstruction, likely transient  PLAN: IVF @ 125; monitor labs; Cardiopulmonary monitoring; clear liq diet; pain / nausea control (see below); surgery consult       SURGERY CONSULT:   left lower quadrant abdominal pain which she states is consistent with her previous discomfort from kidney stones  CT scan was completed with IV contrast which shows findings of mild intussusception of 2 areas of the small bowel  This is currently asymptomatic  The patient is recently status post robotic assisted laparoscopic lysis of adhesions and pexy of the JJ anastomosis in October 2022  The patient's lipase is also elevated however, she has no complaints of epigastric abdominal pain  Plan: No intervention is necessary from a surgical standpoint; diet as tolerated - starting with clear liq    Date: 5/24/23    Day 2  Patient still complains of nausea and diarrhea; Gradually advancing diet   Patient still very nauseated on clear liquid diet;Lipase improved;  K 3 0 - Will also keep patient on her daily dose of 80 meq via port  Plan: Cont ivf @ 125; monitor labs; Cardiopulmonary monitoring; clear liq diet with gradual advancement; pain / nausea control (see below)        ED Triage Vitals   Temperature Pulse Respirations Blood Pressure SpO2   05/23/23 1341 05/23/23 1341 05/23/23 1341 05/23/23 1341 05/23/23 1341   (!) 97 1 °F (36 2 °C) 85 18 128/62 100 %      Temp Source Heart Rate Source Patient Position - Orthostatic VS BP Location FiO2 (%)   05/23/23 1753 05/23/23 1341 05/23/23 1341 05/23/23 1341 --   Temporal Monitor Lying Left arm       Pain Score       05/23/23 1422       10 - Worst Possible Pain          Wt Readings from Last 1 Encounters:   05/23/23 51 9 kg (114 lb 6 7 oz)     Additional Vital Signs:   Date/Time Temp Pulse Resp BP MAP (mmHg) SpO2 O2 Device Patient Position - Orthostatic VS   05/24/23 11:03:23 98 2 °F (36 8 °C) 77 18 117/73 88 98 % -- --   05/24/23 0900 -- -- -- -- -- -- None (Room air) --   05/24/23 07:05:41 97 9 °F (36 6 °C) 72 18 99/62 74 100 % -- --   05/24/23 0252 97 5 °F (36 4 °C) 66 17 102/62 -- 98 % -- --   05/23/23 2333 98 4 °F (36 9 °C) 68 18 102/62 75 97 % -- --   05/23/23 2026 97 6 °F (36 4 °C) 74 20 112/64 -- -- -- --   05/23/23 1753 97 6 °F (36 4 °C) 74 18 112/64 83 97 % None (Room air) Lying   05/23/23 1341 97 1 °F (36 2 °C) Abnormal  85 18 128/62 -- 100 % None (Room air) Lying         EKG:  pending  Pertinent Labs/Diagnostic Test Results:   CT abdomen pelvis with contrast   Final Result by Madelyn Moreno MD (05/23 7873)      1  Findings suggesting mild enterocolitis in the appropriate clinical setting  2  At least 2 areas of mild intussusception, including at the jejunojejunostomy, without evidence of obstruction, likely transient              Workstation performed: [id number]               Results from last 7 days   Lab Units 05/24/23  0538 05/23/23  1923 05/23/23  1410   HEMATOCRIT % 31 3*  --  34 4*   HEMOGLOBIN g/dL 9 5*  --  10 6*   NEUTROS ABS Thousands/µL 4 42  --  5 05   PLATELETS Thousands/uL 281 308 353   WBC Thousand/uL 6 64  --  7 69         Results from last 7 days   Lab Units 05/24/23  0438 05/23/23  2035 05/23/23  1410 05/22/23  0810   ANION GAP mmol/L 6 7 9 8   BUN mg/dL 9 10 11 12   CALCIUM mg/dL 7 8* 8 2* 8 3* 8 2*   CHLORIDE mmol/L 116* 110* 107 110*   CO2 mmol/L 14* 18* 19* 19*   CREATININE mg/dL 0 91 1 02 1 02 1 08   EGFR ml/min/1 73sq m 79 69 69 64   POTASSIUM mmol/L 4 4 3 0* 2 7* 3 2*   MAGNESIUM mg/dL 2 1  --  2 1  --    SODIUM mmol/L 136 135 135 137     Results from last 7 days   Lab Units 05/24/23  0438 05/23/23  1410   ALBUMIN g/dL 3 1* 3 9   ALK PHOS U/L 42 46   ALT U/L 9 14   AST U/L 18 18   TOTAL BILIRUBIN mg/dL 0 37 0 29   TOTAL PROTEIN g/dL 5 7* 6 9         Results from last 7 days   Lab Units 05/24/23  0438 05/23/23  2035 05/23/23  1410 05/22/23  0810   GLUCOSE RANDOM mg/dL 77 96 84 90       BETA-HYDROXYBUTYRATE   Date Value Ref Range Status   09/02/2022 0 4 <0 6 mmol/L Final        Results from last 7 days   Lab Units 05/24/23  0438 05/23/23  1410   LIPASE u/L 311* 779*         Results from last 7 days   Lab Units 05/23/23  1412   BACTERIA UA /hpf Occasional   BILIRUBIN UA  Negative   BLOOD UA  Negative   CLARITY UA  Clear   COLOR UA  Yellow   EPITHELIAL CELLS WET PREP /hpf Occasional   GLUCOSE UA mg/dl Negative   KETONES UA mg/dl Negative   LEUKOCYTES UA  Negative   NITRITE UA  Negative   PH UA  6 0   PROTEIN UA mg/dl 30 (1+)*   RBC UA /hpf 0-1   SPEC GRAV UA  >=1 030   UROBILINOGEN UA E U /dl 0 2   WBC UA /hpf 0-1       ED Treatment:   Medication Administration from 05/23/2023 1332 to 05/23/2023 1751       Date/Time Order Dose Route Action     05/23/2023 1422 EDT sodium chloride 0 9 % bolus 1,000 mL 1,000 mL Intravenous New Bag     05/23/2023 1424 EDT ondansetron (ZOFRAN) injection 4 mg 4 mg Intravenous Given     05/23/2023 1422 EDT HYDROmorphone (DILAUDID) injection 1 mg 1 mg Intravenous Given     05/23/2023 1518 EDT HYDROmorphone (DILAUDID) injection 1 mg 1 mg Intravenous Given     05/23/2023 1510 EDT iohexol (OMNIPAQUE) 350 MG/ML injection (SINGLE-DOSE) 100 mL 100 mL Intravenous Given     05/23/2023 1528 EDT potassium chloride 20 mEq IVPB (premix) 20 mEq Intravenous New Bag          Present on Admission:  • Hypokalemia  • Left upper quadrant abdominal pain  • Hypocalcemia      Admitting Diagnosis: Dehydration [E86 0]  Hypokalemia [E87 6]  Flank pain [R10 9]  Acute pancreatitis without infection or necrosis, unspecified pancreatitis type [K85 90]     Age/Sex: 43 y o  female     Admission Orders: SCD's; Cardiopulmonary monitoring; clear liq diet    Scheduled Medications:  AMILoride, 5 mg, Oral, Daily  amitriptyline, 20 mg, Oral, HS  enoxaparin, 40 mg, Subcutaneous, Daily  escitalopram, 20 mg, Oral, HS      Continuous IV Infusions:  sodium chloride, 125 mL/hr, Intravenous, Continuous      PRN Meds:  acetaminophen, 650 mg, Oral, Q6H PRN  dicyclomine, 20 mg, Oral, 4x Daily PRN (5/23 rec'd x1)  (5/24 rec'd x1 so far today)  ondansetron, 4 mg, Intravenous, Q6H PRN (5/24 rec'd x1 so far today)        IP CONSULT TO ACUTE CARE SURGERY  IP CONSULT TO GASTROENTEROLOGY  IP CONSULT TO NUTRITION SERVICES    Network Utilization Review Department  ATTENTION: Please call with any questions or concerns to [phone] and carefully listen to the prompts so that you are directed to the right person  All voicemails are confidential   Drake Ornelas all requests for admission clinical reviews, approved or denied determinations and any other requests to dedicated fax number below belonging to the campus where the patient is receiving treatment   List of dedicated fax numbers for the Facilities:  [address] DENIALS (Administrative/Medical Necessity) [phone]   [address] (Maternity/NICU/Pediatrics) [phone]   910 Devora Salazar [phone]   [facility] 77 [phone]   [address] [address] [facility] [phone]   [address] [place] Marge Jennings [facility] 134 815 [facility] [phone]

## 2023-05-24 NOTE — PLAN OF CARE
Problem: Potential for Falls  Goal: Patient will remain free of falls  Description: INTERVENTIONS:  - Educate patient/family on patient safety including physical limitations  - Instruct patient to call for assistance with activity   - Consult OT/PT to assist with strengthening/mobility   - Keep Call bell within reach  - Keep bed low and locked with side rails adjusted as appropriate  - Keep care items and personal belongings within reach  - Initiate and maintain comfort rounds  - Make Fall Risk Sign visible to staff  - Offer Toileting every 3 Hours, in advance of need  - Initiate/Maintain 3alarm  - Obtain necessary fall risk management equipment: 3  - Apply yellow socks and bracelet for high fall risk patients  - Consider moving patient to room near nurses station  Outcome: Progressing     Problem: PAIN - ADULT  Goal: Verbalizes/displays adequate comfort level or baseline comfort level  Description: Interventions:  - Encourage patient to monitor pain and request assistance  - Assess pain using appropriate pain scale  - Administer analgesics based on type and severity of pain and evaluate response  - Implement non-pharmacological measures as appropriate and evaluate response  - Consider cultural and social influences on pain and pain management  - Notify physician/advanced practitioner if interventions unsuccessful or patient reports new pain  Outcome: Progressing     Problem: INFECTION - ADULT  Goal: Absence or prevention of progression during hospitalization  Description: INTERVENTIONS:  - Assess and monitor for signs and symptoms of infection  - Monitor lab/diagnostic results  - Monitor all insertion sites, i e  indwelling lines, tubes, and drains  - Monitor endotracheal if appropriate and nasal secretions for changes in amount and color  - [place] appropriate cooling/warming therapies per order  - Administer medications as ordered  - Instruct and encourage patient and family to use good hand hygiene technique  - Identify and instruct in appropriate isolation precautions for identified infection/condition  Outcome: Progressing  Goal: Absence of fever/infection during neutropenic period  Description: INTERVENTIONS:  - Monitor WBC    Outcome: Progressing     Problem: SAFETY ADULT  Goal: Patient will remain free of falls  Description: INTERVENTIONS:  - Educate patient/family on patient safety including physical limitations  - Instruct patient to call for assistance with activity   - Consult OT/PT to assist with strengthening/mobility   - Keep Call bell within reach  - Keep bed low and locked with side rails adjusted as appropriate  - Keep care items and personal belongings within reach  - Initiate and maintain comfort rounds  - Make Fall Risk Sign visible to staff  - Offer Toileting every 3 Hours, in advance of need  - Initiate/Maintain 3alarm  - Obtain necessary fall risk management equipment:     - Apply yellow socks and bracelet for high fall risk patients  - Consider moving patient to room near nurses station  Outcome: Progressing  Goal: Maintain or return to baseline ADL function  Description: INTERVENTIONS:  -  Assess patient's ability to carry out ADLs; assess patient's baseline for ADL function and identify physical deficits which impact ability to perform ADLs (bathing, care of mouth/teeth, toileting, grooming, dressing, etc )  - Assess/evaluate cause of self-care deficits   - Assess range of motion  - Assess patient's mobility; develop plan if impaired  - Assess patient's need for assistive devices and provide as appropriate  - Encourage maximum independence but intervene and supervise when necessary  - Involve family in performance of ADLs  - Assess for home care needs following discharge   - Consider OT consult to assist with ADL evaluation and planning for discharge  - Provide patient education as appropriate  Outcome: Progressing  Goal: Maintains/Returns to pre admission functional level  Description: INTERVENTIONS:  - Perform BMAT or MOVE assessment daily    - Set and communicate daily mobility goal to care team and patient/family/caregiver  - Collaborate with rehabilitation services on mobility goals if consulted  - Perform Range of Motion   Louann Ponces   times a day  - Reposition patient every 3 hours    - Dangle patient 3 times a day  - Stand patient 3 times a day  - Ambulate patient 3 times a day  - Out of bed to chair 3 times a day   - Out of bed for meals 3 times a day  - Out of bed for toileting  - Record patient progress and toleration of activity level   Outcome: Progressing     Problem: DISCHARGE PLANNING  Goal: Discharge to home or other facility with appropriate resources  Description: INTERVENTIONS:  - Identify barriers to discharge w/patient and caregiver  - Arrange for needed discharge resources and transportation as appropriate  - Identify discharge learning needs (meds, wound care, etc )  - Arrange for interpretive services to assist at discharge as needed  - Refer to Case Management Department for coordinating discharge planning if the patient needs post-hospital services based on physician/advanced practitioner order or complex needs related to functional status, cognitive ability, or social support system  Outcome: Progressing

## 2023-05-24 NOTE — ASSESSMENT & PLAN NOTE
· Calcium low at 8 1 in setting of decreased p o  intake secondary to intractable abdominal pain  · 1 g calcium gluc ordered  · Continue to monitor and replete accordingly  · Monitor on telemetry for now  · Corrected calcium wnl

## 2023-05-25 LAB
ALBUMIN SERPL BCP-MCNC: 3.1 G/DL (ref 3.5–5)
ALP SERPL-CCNC: 40 U/L (ref 34–104)
ALT SERPL W P-5'-P-CCNC: 10 U/L (ref 7–52)
ANION GAP SERPL CALCULATED.3IONS-SCNC: 6 MMOL/L (ref 4–13)
AST SERPL W P-5'-P-CCNC: 13 U/L (ref 13–39)
BILIRUB SERPL-MCNC: 0.31 MG/DL (ref 0.2–1)
BUN SERPL-MCNC: 5 MG/DL (ref 5–25)
CALCIUM ALBUM COR SERPL-MCNC: 8.2 MG/DL (ref 8.3–10.1)
CALCIUM SERPL-MCNC: 7.5 MG/DL (ref 8.4–10.2)
CHLORIDE SERPL-SCNC: 114 MMOL/L (ref 96–108)
CO2 SERPL-SCNC: 17 MMOL/L (ref 21–32)
CREAT SERPL-MCNC: 0.95 MG/DL (ref 0.6–1.3)
GFR SERPL CREATININE-BSD FRML MDRD: 75 ML/MIN/1.73SQ M
GLUCOSE SERPL-MCNC: 76 MG/DL (ref 65–140)
MAGNESIUM SERPL-MCNC: 1.8 MG/DL (ref 1.9–2.7)
POTASSIUM SERPL-SCNC: 3.2 MMOL/L (ref 3.5–5.3)
PROT SERPL-MCNC: 5.6 G/DL (ref 6.4–8.4)
SODIUM SERPL-SCNC: 137 MMOL/L (ref 135–147)

## 2023-05-25 RX ORDER — POTASSIUM CHLORIDE 29.8 MG/ML
40 INJECTION INTRAVENOUS ONCE
Status: DISCONTINUED | OUTPATIENT
Start: 2023-05-25 | End: 2023-05-25

## 2023-05-25 RX ORDER — MAGNESIUM SULFATE HEPTAHYDRATE 40 MG/ML
2 INJECTION, SOLUTION INTRAVENOUS ONCE
Status: COMPLETED | OUTPATIENT
Start: 2023-05-25 | End: 2023-05-25

## 2023-05-25 RX ORDER — CHOLESTYRAMINE LIGHT 4 G/5.7G
4 POWDER, FOR SUSPENSION ORAL 2 TIMES DAILY
Status: DISCONTINUED | OUTPATIENT
Start: 2023-05-25 | End: 2023-05-26 | Stop reason: HOSPADM

## 2023-05-25 RX ADMIN — DICYCLOMINE HYDROCHLORIDE 20 MG: 20 TABLET ORAL at 13:27

## 2023-05-25 RX ADMIN — DICYCLOMINE HYDROCHLORIDE 20 MG: 20 TABLET ORAL at 20:47

## 2023-05-25 RX ADMIN — MAGNESIUM SULFATE HEPTAHYDRATE 2 G: 40 INJECTION, SOLUTION INTRAVENOUS at 10:30

## 2023-05-25 RX ADMIN — ESCITALOPRAM OXALATE 20 MG: 10 TABLET ORAL at 21:43

## 2023-05-25 RX ADMIN — AMILORIDE HYDROCLORIDE 5 MG: 5 TABLET ORAL at 08:26

## 2023-05-25 RX ADMIN — SODIUM CHLORIDE 125 ML/HR: 0.9 INJECTION, SOLUTION INTRAVENOUS at 03:59

## 2023-05-25 RX ADMIN — SODIUM CHLORIDE 125 ML/HR: 0.9 INJECTION, SOLUTION INTRAVENOUS at 13:29

## 2023-05-25 RX ADMIN — ERGOCALCIFEROL 50000 UNITS: 1.25 CAPSULE ORAL at 08:26

## 2023-05-25 RX ADMIN — CHOLESTYRAMINE 4 G: 4 POWDER, FOR SUSPENSION ORAL at 17:07

## 2023-05-25 RX ADMIN — ONDANSETRON 4 MG: 2 INJECTION INTRAMUSCULAR; INTRAVENOUS at 08:36

## 2023-05-25 RX ADMIN — AMITRIPTYLINE HYDROCHLORIDE 20 MG: 10 TABLET, FILM COATED ORAL at 21:43

## 2023-05-25 RX ADMIN — POTASSIUM CHLORIDE 40 MEQ: 400 INJECTION, SOLUTION INTRAVENOUS at 11:37

## 2023-05-25 RX ADMIN — POTASSIUM CHLORIDE 40 MEQ: 400 INJECTION, SOLUTION INTRAVENOUS at 08:21

## 2023-05-25 NOTE — ASSESSMENT & PLAN NOTE
"· Ct A/P showing \"Liquid stool throughout the colon with mild mucosal hyperemia  Postsurgical changes of Roshan-en-Y gastric bypass without evidence of obstruction  Gutierrez Lace Gutierrez Lace Findings suggesting mild enterocolitis in the appropriate clinical setting  \"  · Supportive care for now  · IVF hydration, pain control  · Anti-emetics  · Stool studies ordered  · GI consulted on the case  "

## 2023-05-25 NOTE — PROGRESS NOTES
"[address]  Progress Note  Name: Bridget Macias  MRN: [id number]  Unit/Bed#: -45 I Date of Admission: 2023   Date of Service: 2023 I Hospital Day: 2    Assessment/Plan   * Left upper quadrant abdominal pain  Assessment & Plan  · Presents with decreased po intake and left upper quadrant/flank pain with electrolyte abnormalities in setting of acute pancreatitis, enterocolitis, mild intussusception  · Monitor electrolytes and replete accordingly  · CT A/P showin  Findings suggesting mild enterocolitis in the appropriate clinical setting  2  At least 2 areas of mild intussusception, including at the jejunojejunostomy, without evidence of obstruction, likely transient  · Supportive care for now advanced to regular diet, anti-emetics, pain control  · No surgical intervention at this time as per Gen surg  · Gi recs appreciated  · F/u fecal elastase - if positive would treat with pancreatic enzyme replacement  · Cholestyramine ordered   · No indication for endoscopy  · Consider outpatient MRI/MRCP vs EUS for further evaluation of pancreas  · Outpatient GI follow-up with her primary GI team-[facility]    Hypokalemia  Assessment & Plan  · K on admission 2 9 in setting of poor po intake secondary to abdominal pain  · Will monitor and replete accordingly  · Will also keep patient on her daily dose of 80 meq via port  · Monitor on telemetry for now    Intussusception [facility])  Assessment & Plan  · CT A/P showing: \"Scattered nondilated fluid-filled loops of small bowel  At least 2 areas of mild intussusception, including at the jejunojejunostomy (series 2 image 67) and more distally (series 2 image 95), without evidence of upstream obstruction, likely transient  \"  · General surgery consulted on the case  No surgical intervention at this time   Re-consult with questions  · Supportive care, gradual advancement of diet  · Pain control prn    Enterocolitis  Assessment & Plan  · Ct A/P showing " "\"Liquid stool throughout the colon with mild mucosal hyperemia  Postsurgical changes of Roshan-en-Y gastric bypass without evidence of obstruction  Stefany Hollis Findings suggesting mild enterocolitis in the appropriate clinical setting  \"  · Supportive care for now  · IVF hydration, pain control  · Anti-emetics  · Stool studies ordered  · GI consulted on the case    Idiopathic acute pancreatitis without infection or necrosis  Assessment & Plan  · Presents with abdominal pain, nausea, elevated lipase  · CT without evidence of acute pancreatitis  · Supportive care for now  · Gradual advancement of diet, IVF hydration, prn pain control  · Lipase improved    Hypocalcemia  Assessment & Plan  · Calcium low at 8 1 in setting of decreased p o  intake secondary to intractable abdominal pain  · 1 g calcium gluc ordered  · Continue to monitor and replete accordingly  · Monitor on telemetry for now  · Corrected calcium wnl    History of gastric bypass  Assessment & Plan  · Continue with supportive care  Outpatient bariatric surgery follow-up  VTE Pharmacologic Prophylaxis:   Moderate Risk (Score 3-4) - Pharmacological DVT Prophylaxis Ordered: enoxaparin (Lovenox)  Patient Centered Rounds: I performed bedside rounds with nursing staff today  Discussions with Specialists or Other Care Team Provider: GI    Education and Discussions with Family / Patient: patient  Total Time Spent on Date of Encounter in care of patient: 45 minutes This time was spent on one or more of the following: performing physical exam; counseling and coordination of care; obtaining or reviewing history; documenting in the medical record; reviewing/ordering tests, medications or procedures; communicating with other healthcare professionals and discussing with patient's family/caregivers      Current Length of Stay: 2 day(s)  Current Patient Status: Inpatient   Certification Statement: The patient will continue to require additional inpatient hospital " stay due to continuing medical management of symptoms  Discharge Plan: Anticipate discharge tomorrow to home  Code Status: Level 1 - Full Code    Subjective:   Was doing better this am however had severe cramping in the afternoon  Objective:     Vitals:   Temp (24hrs), Av 9 °F (36 6 °C), Min:97 7 °F (36 5 °C), Max:98 1 °F (36 7 °C)    Temp:  [97 7 °F (36 5 °C)-98 1 °F (36 7 °C)] 98 1 °F (36 7 °C)  HR:  [65-79] 72  Resp:  [12-18] 17  BP: (109-112)/(69-73) 110/72  SpO2:  [97 %-100 %] 100 %  Body mass index is 19 04 kg/m²  Input and Output Summary (last 24 hours): Intake/Output Summary (Last 24 hours) at 2023 1410  Last data filed at 2023 0900  Gross per 24 hour   Intake 5317 92 ml   Output --   Net 5317 92 ml       Physical Exam:   Physical Exam  Vitals and nursing note reviewed  Constitutional:       General: She is not in acute distress  Appearance: She is well-developed  HENT:      Head: Normocephalic and atraumatic  Eyes:      Conjunctiva/sclera: Conjunctivae normal    Cardiovascular:      Rate and Rhythm: Normal rate and regular rhythm  Heart sounds: No murmur heard  Pulmonary:      Effort: Pulmonary effort is normal  No respiratory distress  Breath sounds: Normal breath sounds  Abdominal:      Palpations: Abdomen is soft  Tenderness: There is abdominal tenderness  There is no guarding  Musculoskeletal:         General: No swelling  Cervical back: Neck supple  Skin:     General: Skin is warm and dry  Capillary Refill: Capillary refill takes less than 2 seconds  Neurological:      Mental Status: She is alert     Psychiatric:         Mood and Affect: Mood normal             Additional Data:     Labs:  Results from last 7 days   Lab Units 23  0538   EOS PCT % 1   HEMATOCRIT % 31 3*   HEMOGLOBIN g/dL 9 5*   LYMPHS PCT % 22   MONOS PCT % 9   NEUTROS PCT % 67   PLATELETS Thousands/uL 281   WBC Thousand/uL 6 64     Results from last 7 days Lab Units 05/25/23  0359   ANION GAP mmol/L 6   ALBUMIN g/dL 3 1*   ALK PHOS U/L 40   ALT U/L 10   AST U/L 13   BUN mg/dL 5   CALCIUM mg/dL 7 5*   CHLORIDE mmol/L 114*   CO2 mmol/L 17*   CREATININE mg/dL 0 95   GLUCOSE RANDOM mg/dL 76   POTASSIUM mmol/L 3 2*   SODIUM mmol/L 137   TOTAL BILIRUBIN mg/dL 0 31                       Lines/Drains:  Invasive Devices     Central Venous Catheter Line  Duration           Port A Cath Right Chest -- days                Central Line:  Goal for removal: N/A - Chronic PICC             Imaging: No pertinent imaging reviewed      Recent Cultures (last 7 days):   Results from last 7 days   Lab Units 05/24/23  0538   C DIFF TOXIN B BY PCR  Negative       Last 24 Hours Medication List:   Current Facility-Administered Medications   Medication Dose Route Frequency Provider Last Rate   • acetaminophen  650 mg Oral Q6H PRN Libra Bueno MD     • AMILoride  5 mg Oral Daily Libra Bueno MD     • amitriptyline  20 mg Oral HS Michael Jiménez MD     • cholestyramine sugar free  4 g Oral BID Michael Jiménez MD     • [START ON 6/1/2023] cyanocobalamin  1,000 mcg Intramuscular Q30 Days Libra Bueno MD     • dicyclomine  20 mg Oral 4x Daily PRN Michael Jiménez MD     • enoxaparin  40 mg Subcutaneous Daily Libra Bueno MD     • ergocalciferol  50,000 Units Oral Weekly Libra Bueno MD     • escitalopram  20 mg Oral HS Libra Jiménez MD     • ondansetron  4 mg Intravenous Q6H PRN Michael Jiménez MD     • potassium chloride  80 mEq Intravenous Daily Libra Bueno MD 40 mEq (05/25/23 1137)        Today, Patient Was Seen By: Aaron Turner MD    **Please Note: This note may have been constructed using a voice recognition system  **

## 2023-05-25 NOTE — ASSESSMENT & PLAN NOTE
"· CT A/P showing: \"Scattered nondilated fluid-filled loops of small bowel  At least 2 areas of mild intussusception, including at the jejunojejunostomy (series 2 image 67) and more distally (series 2 image 95), without evidence of upstream obstruction, likely transient  \"  · General surgery consulted on the case  No surgical intervention at this time   Re-consult with questions  · Supportive care, gradual advancement of diet  · Pain control prn  "

## 2023-05-25 NOTE — ASSESSMENT & PLAN NOTE
· Presents with decreased po intake and left upper quadrant/flank pain with electrolyte abnormalities in setting of acute pancreatitis, enterocolitis, mild intussusception  · Monitor electrolytes and replete accordingly  · CT A/P showin  Findings suggesting mild enterocolitis in the appropriate clinical setting  2  At least 2 areas of mild intussusception, including at the jejunojejunostomy, without evidence of obstruction, likely transient  · Supportive care for now advanced to regular diet, anti-emetics, pain control  · No surgical intervention at this time as per Gen surg    · Gi recs appreciated  · F/u fecal elastase - if positive would treat with pancreatic enzyme replacement  · Cholestyramine ordered   · No indication for endoscopy  · Consider outpatient MRI/MRCP vs EUS for further evaluation of pancreas  · Outpatient GI follow-up with her primary GI team-[facility]

## 2023-05-25 NOTE — PLAN OF CARE
Pt continues to be treated for hypokalemia  Pt remains on clear liquid diet d/t colitis; per notes, diet to be advanced slowly  Pt denied N/V or pain this shift  Pt remains on IVF  Cdiff result negative and pt taken out of isolation  Pt for labs this am   Will continue to monitor and support as needed      Problem: Potential for Falls  Goal: Patient will remain free of falls  Description: INTERVENTIONS:  - Educate patient/family on patient safety including physical limitations  - Instruct patient to call for assistance with activity   - Consult OT/PT to assist with strengthening/mobility   - Keep Call bell within reach  - Keep bed low and locked with side rails adjusted as appropriate  - Keep care items and personal belongings within reach  - Initiate and maintain comfort rounds  - Apply yellow socks and bracelet for high fall risk patients  - Consider moving patient to room near nurses station  Outcome: Progressing     Problem: PAIN - ADULT  Goal: Verbalizes/displays adequate comfort level or baseline comfort level  Description: Interventions:  - Encourage patient to monitor pain and request assistance  - Assess pain using appropriate pain scale  - Administer analgesics based on type and severity of pain and evaluate response  - Implement non-pharmacological measures as appropriate and evaluate response  - Consider cultural and social influences on pain and pain management  - Notify physician/advanced practitioner if interventions unsuccessful or patient reports new pain  Outcome: Progressing     Problem: INFECTION - ADULT  Goal: Absence or prevention of progression during hospitalization  Description: INTERVENTIONS:  - Assess and monitor for signs and symptoms of infection  - Monitor lab/diagnostic results  - Monitor all insertion sites, i e  indwelling lines, tubes, and drains  - Monitor endotracheal if appropriate and nasal secretions for changes in amount and color  - [place]arte appropriate cooling/warming therapies per order  - Administer medications as ordered  - Instruct and encourage patient and family to use good hand hygiene technique  - Identify and instruct in appropriate isolation precautions for identified infection/condition  Outcome: Progressing     Problem: SAFETY ADULT  Goal: Patient will remain free of falls  Description: INTERVENTIONS:  - Educate patient/family on patient safety including physical limitations  - Instruct patient to call for assistance with activity   - Consult OT/PT to assist with strengthening/mobility   - Keep Call bell within reach  - Keep bed low and locked with side rails adjusted as appropriate  - Keep care items and personal belongings within reach  - Initiate and maintain comfort rounds  - Apply yellow socks and bracelet for high fall risk patients  - Consider moving patient to room near nurses station  Outcome: Progressing  Goal: Maintain or return to baseline ADL function  Description: INTERVENTIONS:  - Educate patient/family on patient safety including physical limitations  - Instruct patient to call for assistance with activity   - Consult OT/PT to assist with strengthening/mobility   - Keep Call bell within reach  - Keep bed low and locked with side rails adjusted as appropriate  - Keep care items and personal belongings within reach  - Initiate and maintain comfort rounds  - Apply yellow socks and bracelet for high fall risk patients  - Consider moving patient to room near nurses station  Outcome: Progressing  Goal: Maintains/Returns to pre admission functional level  Description: INTERVENTIONS:  - Perform BMAT or MOVE assessment daily    - Set and communicate daily mobility goal to care team and patient/family/caregiver     - Collaborate with rehabilitation services on mobility goals if consulted  - Out of bed for toileting  - Record patient progress and toleration of activity level   Outcome: Progressing     Problem: DISCHARGE PLANNING  Goal: Discharge to home or other facility with appropriate resources  Description: INTERVENTIONS:  - Identify barriers to discharge w/patient and caregiver  - Arrange for needed discharge resources and transportation as appropriate  - Identify discharge learning needs (meds, wound care, etc )  - Arrange for interpretive services to assist at discharge as needed  - Refer to Case Management Department for coordinating discharge planning if the patient needs post-hospital services based on physician/advanced practitioner order or complex needs related to functional status, cognitive ability, or social support system  Outcome: Progressing     Problem: Knowledge Deficit  Goal: Patient/family/caregiver demonstrates understanding of disease process, treatment plan, medications, and discharge instructions  Description: Complete learning assessment and assess knowledge base    Interventions:  - Provide teaching at level of understanding  - Provide teaching via preferred learning methods  Outcome: Progressing     Problem: GASTROINTESTINAL - ADULT  Goal: Minimal or absence of nausea and/or vomiting  Description: INTERVENTIONS:  - Administer IV fluids if ordered to ensure adequate hydration  - Maintain NPO status until nausea and vomiting are resolved  - Nasogastric tube if ordered  - Administer ordered antiemetic medications as needed  - Provide nonpharmacologic comfort measures as appropriate  - Advance diet as tolerated, if ordered  - Consider nutrition services referral to assist patient with adequate nutrition and appropriate food choices  Outcome: Progressing  Goal: Maintains or returns to baseline bowel function  Description: INTERVENTIONS:  - Assess bowel function  - Encourage oral fluids to ensure adequate hydration  - Administer IV fluids if ordered to ensure adequate hydration  - Administer ordered medications as needed  - Encourage mobilization and activity  - Consider nutritional services referral to assist patient with adequate nutrition and appropriate food choices  Outcome: Progressing  Goal: Maintains adequate nutritional intake  Description: INTERVENTIONS:  - Monitor percentage of each meal consumed  - Identify factors contributing to decreased intake, treat as appropriate  - Assist with meals as needed  - Monitor I&O, weight, and lab values if indicated  - Obtain nutrition services referral as needed  Outcome: Progressing     Problem: Nutrition/Hydration-ADULT  Goal: Nutrient/Hydration intake appropriate for improving, restoring or maintaining nutritional needs  Description: Monitor and assess patient's nutrition/hydration status for malnutrition  Collaborate with interdisciplinary team and initiate plan and interventions as ordered  Monitor patient's weight and dietary intake as ordered or per policy  Utilize nutrition screening tool and intervene as necessary  Determine patient's food preferences and provide high-protein, high-caloric foods as appropriate       INTERVENTIONS:  - Monitor oral intake, urinary output, labs, and treatment plans  - Assess nutrition and hydration status and recommend course of action  - Evaluate amount of meals eaten  - Assist patient with eating if necessary   - Allow adequate time for meals  - Recommend/ encourage appropriate diets, oral nutritional supplements, and vitamin/mineral supplements  - Order, calculate, and assess calorie counts as needed  - Recommend, monitor, and adjust tube feedings and TPN/PPN based on assessed needs  - Assess need for intravenous fluids  - Provide specific nutrition/hydration education as appropriate  - Include patient/family/caregiver in decisions related to nutrition  Outcome: Progressing     Problem: METABOLIC, FLUID AND ELECTROLYTES - ADULT  Goal: Electrolytes maintained within normal limits  Description: INTERVENTIONS:  - Monitor labs and assess patient for signs and symptoms of electrolyte imbalances  - Administer electrolyte replacement as ordered  - Monitor response to electrolyte replacements, including repeat lab results as appropriate  - Instruct patient on fluid and nutrition as appropriate  Outcome: Progressing

## 2023-05-26 VITALS
OXYGEN SATURATION: 96 % | SYSTOLIC BLOOD PRESSURE: 121 MMHG | RESPIRATION RATE: 14 BRPM | HEIGHT: 65 IN | TEMPERATURE: 97.9 F | WEIGHT: 114.42 LBS | HEART RATE: 84 BPM | DIASTOLIC BLOOD PRESSURE: 94 MMHG | BODY MASS INDEX: 19.06 KG/M2

## 2023-05-26 LAB
ANION GAP SERPL CALCULATED.3IONS-SCNC: 6 MMOL/L (ref 4–13)
ATRIAL RATE: 63 BPM
BUN SERPL-MCNC: 5 MG/DL (ref 5–25)
CALCIUM SERPL-MCNC: 7.8 MG/DL (ref 8.4–10.2)
CHLORIDE SERPL-SCNC: 111 MMOL/L (ref 96–108)
CO2 SERPL-SCNC: 19 MMOL/L (ref 21–32)
CREAT SERPL-MCNC: 0.95 MG/DL (ref 0.6–1.3)
ELASTASE PANC STL-MCNT: 112 UG ELAST./G
GFR SERPL CREATININE-BSD FRML MDRD: 75 ML/MIN/1.73SQ M
GLUCOSE SERPL-MCNC: 79 MG/DL (ref 65–140)
MAGNESIUM SERPL-MCNC: 2.1 MG/DL (ref 1.9–2.7)
P AXIS: 63 DEGREES
POTASSIUM SERPL-SCNC: 3.1 MMOL/L (ref 3.5–5.3)
PR INTERVAL: 120 MS
QRS AXIS: 42 DEGREES
QRSD INTERVAL: 96 MS
QT INTERVAL: 406 MS
QTC INTERVAL: 415 MS
SODIUM SERPL-SCNC: 136 MMOL/L (ref 135–147)
T WAVE AXIS: 46 DEGREES
VENTRICULAR RATE: 63 BPM

## 2023-05-26 RX ORDER — DICYCLOMINE HCL 20 MG
20 TABLET ORAL 4 TIMES DAILY PRN
Qty: 6 TABLET | Refills: 0 | Status: SHIPPED | OUTPATIENT
Start: 2023-05-26 | End: 2023-05-29

## 2023-05-26 RX ORDER — POTASSIUM CHLORIDE 20MEQ/15ML
40 LIQUID (ML) ORAL ONCE
Status: DISCONTINUED | OUTPATIENT
Start: 2023-05-26 | End: 2023-05-26 | Stop reason: HOSPADM

## 2023-05-26 RX ORDER — CHOLESTYRAMINE LIGHT 4 G/5.7G
4 POWDER, FOR SUSPENSION ORAL 2 TIMES DAILY
Qty: 6 PACKET | Refills: 0 | Status: SHIPPED | OUTPATIENT
Start: 2023-05-26 | End: 2023-05-29

## 2023-05-26 RX ORDER — POTASSIUM CHLORIDE 400 MEQ/1000ML
80 INJECTION, SOLUTION INTRAVENOUS DAILY
Qty: 2800 ML | Refills: 0 | Status: SHIPPED | OUTPATIENT
Start: 2023-05-26

## 2023-05-26 RX ORDER — POTASSIUM CHLORIDE 20 MEQ/1
40 TABLET, EXTENDED RELEASE ORAL ONCE
Status: COMPLETED | OUTPATIENT
Start: 2023-05-26 | End: 2023-05-26

## 2023-05-26 RX ADMIN — AMILORIDE HYDROCLORIDE 5 MG: 5 TABLET ORAL at 08:49

## 2023-05-26 RX ADMIN — POTASSIUM CHLORIDE 40 MEQ: 1500 TABLET, EXTENDED RELEASE ORAL at 14:16

## 2023-05-26 RX ADMIN — CHOLESTYRAMINE 4 G: 4 POWDER, FOR SUSPENSION ORAL at 08:50

## 2023-05-26 RX ADMIN — POTASSIUM CHLORIDE 80 MEQ: 400 INJECTION, SOLUTION INTRAVENOUS at 08:56

## 2023-05-26 NOTE — DISCHARGE SUMMARY
"[address]  Discharge- Eustace Snellen 1985, 43 y o  female MRN: [id number]  Unit/Bed#: -01 Encounter: [id number]  Primary Care Provider: Eloy Ramirez MD   Date and time admitted to hospital: 2023  1:42 PM    * Left upper quadrant abdominal pain  Assessment & Plan  · Presents with decreased po intake and left upper quadrant/flank pain with electrolyte abnormalities in setting of acute pancreatitis, enterocolitis, mild intussusception  · Monitor electrolytes and replete accordingly  · CT A/P showin  Findings suggesting mild enterocolitis in the appropriate clinical setting  2  At least 2 areas of mild intussusception, including at the jejunojejunostomy, without evidence of obstruction, likely transient  · No surgical intervention as per Gen surg  · GI recs appreciated  · F/u fecal elastase - if positive would treat with pancreatic enzyme replacement  · Cholestyramine ordered   · No indication for endoscopy  · Consider outpatient MRI/MRCP vs EUS for further evaluation of pancreas  · Outpatient GI follow-up with her primary GI team-[facility]  · Abdominal pain improved at this time and patient tolerating regular diet    Hypokalemia  Assessment & Plan  · K on admission 2 9 in setting of poor po intake secondary to abdominal pain  · Patient to continue home infusions of 80 mg daily or as instructed by her Nephrologist Dr Shayna Adorno  · Patient will need to repeat her BMP in 5 days    Intussusception [facility])  Assessment & Plan  · CT A/P showing: \"Scattered nondilated fluid-filled loops of small bowel  At least 2 areas of mild intussusception, including at the jejunojejunostomy (series 2 image 67) and more distally (series 2 image 95), without evidence of upstream obstruction, likely transient  \"  · General surgery consulted on the case  No surgical intervention at this time   Re-consult with questions  · Supportive care, gradual advancement of diet  · Pain control " "prn    Enterocolitis  Assessment & Plan  · Ct A/P showing \"Liquid stool throughout the colon with mild mucosal hyperemia  Postsurgical changes of Roshan-en-Y gastric bypass without evidence of obstruction  Sudhakar Ribeiro Findings suggesting mild enterocolitis in the appropriate clinical setting  \"  · Supportive care for now  · IVF hydration, pain control  · Anti-emetics  · Stool studies ordered - negative  · GI consulted on the case  · Diarrhea improving    Idiopathic acute pancreatitis without infection or necrosis  Assessment & Plan  · Presents with abdominal pain, nausea, elevated lipase  · CT without evidence of acute pancreatitis  · Supportive care for now  · Lipase improved  · Patient now tolerating a regular diet with improvement in abdominal pain    Hypocalcemia  Assessment & Plan  · Calcium low at 8 1 in setting of decreased p o  intake secondary to intractable abdominal pain  · 1 g calcium gluc ordered  · Continue to monitor and replete accordingly  · Monitor on telemetry for now  · Corrected calcium wnl    History of gastric bypass  Assessment & Plan  · Continue with supportive care  Outpatient bariatric surgery follow-up  Medical Problems     Resolved Problems  Date Reviewed: 4/18/2023   None       Discharging Physician / Practitioner: Fredy Arana MD  PCP: Colleen Ornelas MD  Admission Date:   Admission Orders (From admission, onward)     Ordered        05/23/23 [facility]  Once                      Discharge Date: 05/26/23    Consultations During Hospital Stay:  · General surgery and gastroenterology    Procedures Performed:   · None    Significant Findings / Test Results:   CT abdomen pelvis with contrast   Final Result by Nikhil Holland MD (05/23 2621)      1  Findings suggesting mild enterocolitis in the appropriate clinical setting  2  At least 2 areas of mild intussusception, including at the jejunojejunostomy, without evidence of obstruction, likely transient   " "           Workstation performed: [id number]           Incidental Findings:   · none     Test Results Pending at Discharge (will require follow up): · Fecal elastase     Outpatient Tests Requested:  · Repeat BMP in 1 week    Complications: None    Reason for Admission: acute pancreatitis, enterocolitis, hypokalemia    Hospital Course: Jerlene Denver is a 43 y o  female patient who originally presented to the hospital on 5/23/2023 due to severe abdominal pain associated with reduced po intake, diarrhea and nausea  CT scan was significant for enterocolitis and mild intussusception  General surgery and GI were consulted on the case  No surgical intervention was warranted during this admission and patient was managed with supportive care, IVF, pain control, and gradual advancement of diet  Electrolytes also repleted  Patient's symptoms have improved and patient is stable for discharge home at this time  She has been advised to follow-up with her GI doctor, Nephrologist and PCP within 1-2 weeks of discharge  Please see above list of diagnoses and related plan for additional information  Condition at Discharge: stable    Discharge Day Visit / Exam:   Subjective: And examined at bedside  Currently tolerating her regular diet  Denies any abdominal pain and endorses diarrhea is resolving  Vitals: Blood Pressure: 108/68 (05/26/23 0733)  Pulse: 81 (05/26/23 0733)  Temperature: 97 5 °F (36 4 °C) (05/26/23 0733)  Temp Source: Temporal (05/25/23 1110)  Respirations: 14 (05/26/23 0238)  Height: 5' 5\" (165 1 cm) (05/24/23 1444)  Weight - Scale: 51 9 kg (114 lb 6 7 oz) (05/23/23 2026)  SpO2: 99 % (05/26/23 0851)  Exam:   Physical Exam  Vitals and nursing note reviewed  Constitutional:       General: She is not in acute distress  Appearance: She is well-developed  HENT:      Head: Normocephalic and atraumatic     Eyes:      Conjunctiva/sclera: Conjunctivae normal    Cardiovascular:      Rate and Rhythm: Normal " rate and regular rhythm  Heart sounds: No murmur heard  Pulmonary:      Effort: Pulmonary effort is normal  No respiratory distress  Breath sounds: Normal breath sounds  Abdominal:      Palpations: Abdomen is soft  Tenderness: There is no abdominal tenderness  Musculoskeletal:         General: No swelling  Cervical back: Neck supple  Skin:     General: Skin is warm and dry  Capillary Refill: Capillary refill takes less than 2 seconds  Neurological:      Mental Status: She is alert  Psychiatric:         Mood and Affect: Mood normal             Discussion with Family: Patient declined call to   Discharge instructions/Information to patient and family:   See after visit summary for information provided to patient and family  Provisions for Follow-Up Care:  See after visit summary for information related to follow-up care and any pertinent home health orders  Disposition:   Home    Planned Readmission: none     Discharge Statement:  I spent 50 minutes discharging the patient  This time was spent on the day of discharge  I had direct contact with the patient on the day of discharge  Greater than 50% of the total time was spent examining patient, answering all patient questions, arranging and discussing plan of care with patient as well as directly providing post-discharge instructions  Additional time then spent on discharge activities  Discharge Medications:  See after visit summary for reconciled discharge medications provided to patient and/or family        **Please Note: This note may have been constructed using a voice recognition system**

## 2023-05-26 NOTE — NURSING NOTE
Patient discharged home today; port a cath present on admission and remained accessed when patient left, patient states it was accessed prior to coming in and does not want it to be deaccessed due to her needing to use it this evening for her infusion  Seda Hamlin, RN notified; AVS reviewed with patient and patient verbalizes understanding; patient ambulates to exit accompanied by this RN

## 2023-05-26 NOTE — PLAN OF CARE
Problem: GASTROINTESTINAL - ADULT  Goal: Minimal or absence of nausea and/or vomiting  Description: INTERVENTIONS:  - Administer IV fluids if ordered to ensure adequate hydration  - Maintain NPO status until nausea and vomiting are resolved  - Nasogastric tube if ordered  - Administer ordered antiemetic medications as needed  - Provide nonpharmacologic comfort measures as appropriate  - Advance diet as tolerated, if ordered  - Consider nutrition services referral to assist patient with adequate nutrition and appropriate food choices  Outcome: Progressing     Problem: GASTROINTESTINAL - ADULT  Goal: Maintains or returns to baseline bowel function  Description: INTERVENTIONS:  - Assess bowel function  - Encourage oral fluids to ensure adequate hydration  - Administer IV fluids if ordered to ensure adequate hydration  - Administer ordered medications as needed  - Encourage mobilization and activity  - Consider nutritional services referral to assist patient with adequate nutrition and appropriate food choices  Outcome: Progressing     Problem: GASTROINTESTINAL - ADULT  Goal: Maintains adequate nutritional intake  Description: INTERVENTIONS:  - Monitor percentage of each meal consumed  - Identify factors contributing to decreased intake, treat as appropriate  - Assist with meals as needed  - Monitor I&O, weight, and lab values if indicated  - Obtain nutrition services referral as needed  Outcome: Progressing

## 2023-05-26 NOTE — ASSESSMENT & PLAN NOTE
· Presents with abdominal pain, nausea, elevated lipase  · CT without evidence of acute pancreatitis  · Supportive care for now  · Lipase improved  · Patient now tolerating a regular diet with improvement in abdominal pain

## 2023-05-26 NOTE — DISCHARGE INSTR - AVS FIRST PAGE
Please continue with your daily potassium infusions at home  You are to repeat a BMP to check on your potassium levels on Tuesday (5/30)  Please see your PCP and GI doctor within 1-2 weeks of discharge

## 2023-05-26 NOTE — PROGRESS NOTES
[facility]  Progress Note - Geovanna Mahmood 43 y o  female MRN: [id number]    Unit/Bed#: -01 Encounter: [id number]    Assessment/Plan:  Recurrent acute idiopathic pancreatitis  -No evidence of hypercalcemia, elevated triglycerides, elevated liver enzymes she is status post cholecystectomy, she denies alcohol use, IgG4 level normal which is not suggestive of autoimmune pancreatitis  -Multiple CT imaging without evidence of structural abnormality  -Possible genetic component although this would not   -History of pancreatic insufficiency     Abdominal pain  Intussusception  -Status post elective robotic assisted laparoscopic diagnostic laparoscopy and pexy of small bowel and EGD 10/2022 at [facility]  -Underwent EGD/colonoscopy 10/2022 biopsies negative for enteropathy, H  pylori, IBD or microscopic colitis  -Appreciate surgical evaluation and recommendations  -Pancreatitis might be at play-overall improving  -May be a self-limiting gastroenteritis  -Do not suspect obstruction     Chronic diarrhea  -Ongoing since her gastric bypass surgery 10 years ago  -Extensive serologic, stool and endoscopic work-up in the past without evidence of enteropathy including celiac disease, infectious etiology, IBD, microscopic colitis, neuroendocrine tumor, zinc deficiency  -History of pancreatic insufficiency but not on pancreatic enzymes-fecal elastase pending  -Negative C  difficile, enteric panel, ova and parasites  -Status post cholecystectomy 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   Bile salt diarrhea    Plan:  -Overall improvement  -Supportive care  -Advance to low-fat low fiber diet  -Follow-up on fecal elastase-if positive would treat with pancreatic enzyme replacement  -Consider scheduling Bentyl around-the-clock  -Continue cholestyramine for diarrhea  -Replete electrolytes as per primary team  -No indication for inpatient endoscopic evaluation at this time  -Consider "outpatient MRI/MRCP vs EUS for further evaluation of pancreas  -Outpatient GI follow-up with her primary GI team-NATALI MARIE  [facility]  Gastroenterology    Patient plan of care formulated with Dr Erick Pires  Subjective:   Patient reports that she is slowly improving  States that her left lower quadrant abdominal pain has improved, as has her nausea  Both are still present but to a lesser degree  Nausea is worse after eating, therefore she is taking her time, and eating small amounts and spacing out her bites to reduce her nausea  States that she moved her bowels approximately 8 times since yesterday, which is an improvement, and states that the stools are thickening as well  Had 1 dose of cholestyramine last night, and is taking her second dose this morning  Is hoping to be discharged home today  Objective:     Vitals: Blood pressure 108/68, pulse 81, temperature 97 5 °F (36 4 °C), resp  rate 14, height 5' 5\" (1 651 m), weight 51 9 kg (114 lb 6 7 oz), SpO2 99 %  ,Body mass index is 19 04 kg/m²  Intake/Output Summary (Last 24 hours) at 5/26/2023 1054  Last data filed at 5/25/2023 2045  Gross per 24 hour   Intake 1959 58 ml   Output --   Net 1959 58 ml       Review of Systems: as per HPI  Review of Systems   Gastrointestinal: Positive for abdominal pain, diarrhea and nausea  Negative for anal bleeding, blood in stool and vomiting  All other systems reviewed and are negative  Physical Exam:     Physical Exam  Vitals and nursing note reviewed  Constitutional:       General: She is not in acute distress  Appearance: She is well-developed  HENT:      Head: Normocephalic and atraumatic  Mouth/Throat:      Mouth: Mucous membranes are moist       Pharynx: Oropharynx is clear  No oropharyngeal exudate  Eyes:      General: No scleral icterus  Conjunctiva/sclera: Conjunctivae normal    Cardiovascular:      Rate and Rhythm: Normal rate     Pulmonary:      Effort: Pulmonary " "effort is normal  No respiratory distress  Abdominal:      General: There is no distension  Palpations: Abdomen is soft  Tenderness: There is abdominal tenderness (Mild left lower quadrant tenderness)  There is no guarding  Skin:     General: Skin is warm and dry  Coloration: Skin is not jaundiced  Neurological:      General: No focal deficit present  Mental Status: She is alert and oriented to person, place, and time  Psychiatric:         Mood and Affect: Mood normal            Invasive Devices     Central Venous Catheter Line  Duration           Port A Cath Right Chest -- days                        CBC: No results found for: \"ADJUSTEDWBC\", \"HCT\", \"HGB\", \"MCH\", \"MCHC\", \"MCV\", \"MPV\", \"NRBC\", \"PLT\", \"RBC\", \"RDW\", \"WBC\",   CMP:   Lab Results   Component Value Date    BUN 5 05/26/2023    CALCIUM 7 8 (L) 05/26/2023     (H) 05/26/2023    CO2 19 (L) 05/26/2023    CREATININE 0 95 05/26/2023    EGFR 75 05/26/2023    K 3 1 (L) 05/26/2023   ,   Lipase: No results found for: \"LIPASE\",  PT/INR: No results found for: \"INR\", \"PT\",   Troponin: No results found for: \"TROPONINI\",   Magnesium: No components found for: \"MAG\",   Phosphorous: No results found for: \"PHOS\"    Counseling / Coordination of Care  Total time spent today  15 minutes  Greater than 50% of total time was spent with the patient and / or family counseling and / or coordination of care       "

## 2023-05-26 NOTE — CASE MANAGEMENT
Case Management Discharge Planning Note    Patient name Jessie Mccauley /-89 MRN [id number]  : 1985 Date 2023       Current Admission Date: 2023  Current Admission Diagnosis:Left upper quadrant abdominal pain   Patient Active Problem List    Diagnosis Date Noted   • Enterocolitis 2023   • Intussusception ([address] ) 2023   • Leukocytosis 2023   • Left ureteral calculus 2023   • Idiopathic acute pancreatitis without infection or necrosis 2022   • Perianal abscess 2022   • Episode of shaking    • Psychogenic nonepileptic spells 2022   • History of anxiety 2022   • Intractable vomiting 2022   • Hyponatremia 2022   • Iron deficiency 2021   • Other hemorrhoids    • Open wound / skin tear of right chest wall 2021   • Cellulitis of chest wall 2021   • Chronic pancreatitis ([facility] [address] ) 2021   • Acute on chronic abdominal pain 2021   • Abnormal LFTs 2021   • Gas pain 2021   • Middle ear effusion 2020   • Normal anion gap metabolic acidosis    • Left upper quadrant abdominal pain 2019   • Vertigo 2019   • Stress fracture of right foot with routine healing 2019   • Right ovarian cyst 2019   • Chest pain 2019   • Other intestinal malabsorption 10/05/2018   • Gastric bypass status for obesity 10/02/2018   • GERD (gastroesophageal reflux disease) 2018   • Paresthesia of both lower extremities 08/15/2018   • Paresthesias 08/15/2018   • Fatigue 2018   • Elevated CPK 2018   • Vitamin B12 deficiency 2018   • Cervical lymphadenopathy 2018   • Chronic anemia 07/10/2018   • Vitamin D insufficiency 07/10/2018   • Hypocalcemia 2018   • Hypophosphatemia 2018   • Hypokalemia 2018   • History of gastric bypass 2018   • Migraine without aura and without status migrainosus, not intractable 2018   • Left-sided weakness 07/07/2018   • Transaminitis 07/07/2018   • B12 deficiency 01/17/2016   • WAGNER (obstructive sleep apnea) 06/11/2014   • Migraine 05/29/2014      LOS (days): 3  Geometric Mean LOS (GMLOS) (days): 4 60  Days to GMLOS:1 8     OBJECTIVE:  Risk of Unplanned Readmission Score: 25 61         Current admission status: Inpatient   Preferred Pharmacy:   [facility] [address] [facility], [address] [address] [address]  Phone: [phone] Fax: [phone] [facility], [facility] [address]  Phone: [phone] Fax: [phone]    Primary Care Provider: Danielle Irene MD    Primary Insurance: Santiago Barker  Secondary Insurance:     DISCHARGE DETAILS:         faxed resumption order for potassium chloride to [facility] ([phone]) for home infusion resumption

## 2023-05-26 NOTE — ASSESSMENT & PLAN NOTE
"· Ct A/P showing \"Liquid stool throughout the colon with mild mucosal hyperemia  Postsurgical changes of Roshan-en-Y gastric bypass without evidence of obstruction  Iva Bruce Findings suggesting mild enterocolitis in the appropriate clinical setting  \"  · Supportive care for now  · IVF hydration, pain control  · Anti-emetics  · Stool studies ordered - negative  · GI consulted on the case  · Diarrhea improving  "

## 2023-05-26 NOTE — PLAN OF CARE
Problem: PAIN - ADULT  Goal: Verbalizes/displays adequate comfort level or baseline comfort level  Description: Interventions:  - Encourage patient to monitor pain and request assistance  - Assess pain using appropriate pain scale  - Administer analgesics based on type and severity of pain and evaluate response  - Implement non-pharmacological measures as appropriate and evaluate response  - Consider cultural and social influences on pain and pain management  - Notify physician/advanced practitioner if interventions unsuccessful or patient reports new pain  Outcome: Progressing     Problem: SAFETY ADULT  Goal: Patient will remain free of falls  Description: INTERVENTIONS:  - Educate patient/family on patient safety including physical limitations  - Instruct patient to call for assistance with activity   - Consult OT/PT to assist with strengthening/mobility   - Keep Call bell within reach  - Keep bed low and locked with side rails adjusted as appropriate  - Keep care items and personal belongings within reach  - Initiate and maintain comfort rounds  - Apply yellow socks and bracelet for high fall risk patients  - Consider moving patient to room near nurses station  Outcome: Progressing     Problem: GASTROINTESTINAL - ADULT  Goal: Minimal or absence of nausea and/or vomiting  Description: INTERVENTIONS:  - Administer IV fluids if ordered to ensure adequate hydration  - Maintain NPO status until nausea and vomiting are resolved  - Nasogastric tube if ordered  - Administer ordered antiemetic medications as needed  - Provide nonpharmacologic comfort measures as appropriate  - Advance diet as tolerated, if ordered  - Consider nutrition services referral to assist patient with adequate nutrition and appropriate food choices  Outcome: Progressing

## 2023-05-26 NOTE — PLAN OF CARE
Problem: Potential for Falls  Goal: Patient will remain free of falls  Description: INTERVENTIONS:  - Educate patient/family on patient safety including physical limitations  - Instruct patient to call for assistance with activity   - Consult OT/PT to assist with strengthening/mobility   - Keep Call bell within reach  - Keep bed low and locked with side rails adjusted as appropriate  - Keep care items and personal belongings within reach  - Initiate and maintain comfort rounds  - Apply yellow socks and bracelet for high fall risk patients  - Consider moving patient to room near nurses station  5/26/2023 1434 by Louann Persaud RN  Outcome: Adequate for Discharge  5/26/2023 1131 by Louann Persaud RN  Outcome: Progressing     Problem: PAIN - ADULT  Goal: Verbalizes/displays adequate comfort level or baseline comfort level  Description: Interventions:  - Encourage patient to monitor pain and request assistance  - Assess pain using appropriate pain scale  - Administer analgesics based on type and severity of pain and evaluate response  - Implement non-pharmacological measures as appropriate and evaluate response  - Consider cultural and social influences on pain and pain management  - Notify physician/advanced practitioner if interventions unsuccessful or patient reports new pain  5/26/2023 1434 by Louann Persaud RN  Outcome: Adequate for Discharge  5/26/2023 1131 by Louann Persaud RN  Outcome: Progressing     Problem: INFECTION - ADULT  Goal: Absence or prevention of progression during hospitalization  Description: INTERVENTIONS:  - Assess and monitor for signs and symptoms of infection  - Monitor lab/diagnostic results  - Monitor all insertion sites, i e  indwelling lines, tubes, and drains  - Monitor endotracheal if appropriate and nasal secretions for changes in amount and color  - [place] appropriate cooling/warming therapies per order  - Administer medications as ordered  - Instruct and encourage patient and family to use good hand hygiene technique  - Identify and instruct in appropriate isolation precautions for identified infection/condition  5/26/2023 1434 by Favian Arguelles RN  Outcome: Adequate for Discharge  5/26/2023 1131 by Favian Arguelles RN  Outcome: Progressing     Problem: SAFETY ADULT  Goal: Patient will remain free of falls  Description: INTERVENTIONS:  - Educate patient/family on patient safety including physical limitations  - Instruct patient to call for assistance with activity   - Consult OT/PT to assist with strengthening/mobility   - Keep Call bell within reach  - Keep bed low and locked with side rails adjusted as appropriate  - Keep care items and personal belongings within reach  - Initiate and maintain comfort rounds  - Apply yellow socks and bracelet for high fall risk patients  - Consider moving patient to room near nurses station  5/26/2023 1434 by Favian Arguelles RN  Outcome: Adequate for Discharge  5/26/2023 1131 by Favian Arguelles RN  Outcome: Progressing  Goal: Maintain or return to baseline ADL function  Description: INTERVENTIONS:  - Educate patient/family on patient safety including physical limitations  - Instruct patient to call for assistance with activity   - Consult OT/PT to assist with strengthening/mobility   - Keep Call bell within reach  - Keep bed low and locked with side rails adjusted as appropriate  - Keep care items and personal belongings within reach  - Initiate and maintain comfort rounds  - Apply yellow socks and bracelet for high fall risk patients  - Consider moving patient to room near nurses station  5/26/2023 1434 by Favian Arguelles RN  Outcome: Adequate for Discharge  5/26/2023 1131 by Favian Arguelles RN  Outcome: Progressing  Goal: Maintains/Returns to pre admission functional level  Description: INTERVENTIONS:  - Perform BMAT or MOVE assessment daily    - Set and communicate daily mobility goal to care team and patient/family/caregiver     - Collaborate with rehabilitation services on mobility goals if consulted  - Out of bed for toileting  - Record patient progress and toleration of activity level   5/26/2023 1434 by Viola Persaud RN  Outcome: Adequate for Discharge  5/26/2023 1131 by Viola Persaud RN  Outcome: Progressing     Problem: DISCHARGE PLANNING  Goal: Discharge to home or other facility with appropriate resources  Description: INTERVENTIONS:  - Identify barriers to discharge w/patient and caregiver  - Arrange for needed discharge resources and transportation as appropriate  - Identify discharge learning needs (meds, wound care, etc )  - Arrange for interpretive services to assist at discharge as needed  - Refer to Case Management Department for coordinating discharge planning if the patient needs post-hospital services based on physician/advanced practitioner order or complex needs related to functional status, cognitive ability, or social support system  5/26/2023 1434 by Viola Persaud RN  Outcome: Adequate for Discharge  5/26/2023 1131 by Viola Persaud RN  Outcome: Progressing     Problem: Knowledge Deficit  Goal: Patient/family/caregiver demonstrates understanding of disease process, treatment plan, medications, and discharge instructions  Description: Complete learning assessment and assess knowledge base    Interventions:  - Provide teaching at level of understanding  - Provide teaching via preferred learning methods  5/26/2023 1434 by Viola Persaud RN  Outcome: Adequate for Discharge  5/26/2023 1131 by Viola Persaud RN  Outcome: Progressing     Problem: GASTROINTESTINAL - ADULT  Goal: Minimal or absence of nausea and/or vomiting  Description: INTERVENTIONS:  - Administer IV fluids if ordered to ensure adequate hydration  - Maintain NPO status until nausea and vomiting are resolved  - Nasogastric tube if ordered  - Administer ordered antiemetic medications as needed  - Provide nonpharmacologic comfort measures as appropriate  - Advance diet as tolerated, if ordered  - Consider nutrition services referral to assist patient with adequate nutrition and appropriate food choices  5/26/2023 1434 by Jed Diallo RN  Outcome: Adequate for Discharge  5/26/2023 1131 by Jed Diallo RN  Outcome: Progressing  Goal: Maintains or returns to baseline bowel function  Description: INTERVENTIONS:  - Assess bowel function  - Encourage oral fluids to ensure adequate hydration  - Administer IV fluids if ordered to ensure adequate hydration  - Administer ordered medications as needed  - Encourage mobilization and activity  - Consider nutritional services referral to assist patient with adequate nutrition and appropriate food choices  5/26/2023 1434 by Jed Diallo RN  Outcome: Adequate for Discharge  5/26/2023 1131 by Jed Diallo RN  Outcome: Progressing  Goal: Maintains adequate nutritional intake  Description: INTERVENTIONS:  - Monitor percentage of each meal consumed  - Identify factors contributing to decreased intake, treat as appropriate  - Assist with meals as needed  - Monitor I&O, weight, and lab values if indicated  - Obtain nutrition services referral as needed  5/26/2023 1434 by Jed Diallo RN  Outcome: Adequate for Discharge  5/26/2023 1131 by Jed Diallo RN  Outcome: Progressing     Problem: Nutrition/Hydration-ADULT  Goal: Nutrient/Hydration intake appropriate for improving, restoring or maintaining nutritional needs  Description: Monitor and assess patient's nutrition/hydration status for malnutrition  Collaborate with interdisciplinary team and initiate plan and interventions as ordered  Monitor patient's weight and dietary intake as ordered or per policy  Utilize nutrition screening tool and intervene as necessary  Determine patient's food preferences and provide high-protein, high-caloric foods as appropriate       INTERVENTIONS:  - Monitor oral intake, urinary output, labs, and treatment plans  - Assess nutrition and hydration status and recommend course of action  - Evaluate amount of meals eaten  - Assist patient with eating if necessary   - Allow adequate time for meals  - Recommend/ encourage appropriate diets, oral nutritional supplements, and vitamin/mineral supplements  - Order, calculate, and assess calorie counts as needed  - Recommend, monitor, and adjust tube feedings and TPN/PPN based on assessed needs  - Assess need for intravenous fluids  - Provide specific nutrition/hydration education as appropriate  - Include patient/family/caregiver in decisions related to nutrition  5/26/2023 1434 by Bernie Montenegro RN  Outcome: Adequate for Discharge  5/26/2023 1131 by Bernie Montenegro RN  Outcome: Progressing     Problem: METABOLIC, FLUID AND ELECTROLYTES - ADULT  Goal: Electrolytes maintained within normal limits  Description: INTERVENTIONS:  - Monitor labs and assess patient for signs and symptoms of electrolyte imbalances  - Administer electrolyte replacement as ordered  - Monitor response to electrolyte replacements, including repeat lab results as appropriate  - Instruct patient on fluid and nutrition as appropriate  5/26/2023 1434 by Bernie Montenegro RN  Outcome: Adequate for Discharge  5/26/2023 1131 by Bernie Montenegro RN  Outcome: Progressing

## 2023-05-26 NOTE — ASSESSMENT & PLAN NOTE
· K on admission 2 9 in setting of poor po intake secondary to abdominal pain  · Patient to continue home infusions of 80 mg daily or as instructed by her Nephrologist Dr Deann Pinto  · Patient will need to repeat her BMP in 5 days

## 2023-05-26 NOTE — ASSESSMENT & PLAN NOTE
· Presents with decreased po intake and left upper quadrant/flank pain with electrolyte abnormalities in setting of acute pancreatitis, enterocolitis, mild intussusception  · Monitor electrolytes and replete accordingly  · CT A/P showin  Findings suggesting mild enterocolitis in the appropriate clinical setting  2  At least 2 areas of mild intussusception, including at the jejunojejunostomy, without evidence of obstruction, likely transient  · No surgical intervention as per Gen surg    · GI recs appreciated  · F/u fecal elastase - if positive would treat with pancreatic enzyme replacement  · Cholestyramine ordered   · No indication for endoscopy  · Consider outpatient MRI/MRCP vs EUS for further evaluation of pancreas  · Outpatient GI follow-up with her primary GI team-[facility]  · Abdominal pain improved at this time and patient tolerating regular diet

## 2023-05-30 NOTE — UTILIZATION REVIEW
NOTIFICATION OF ADMISSION DISCHARGE   This is a Notification of Discharge from [address]  Please be advised that this patient has been discharge from our facility  Below you will find the admission and discharge date and time including the patient’s disposition  UTILIZATION REVIEW CONTACT:  [address] [place]  Utilization   Network Utilization Review Department  Phone: [phone] x carefully listen to the prompts  All voicemails are confidential   Email: [email or web address]     ADMISSION INFORMATION  PRESENTATION DATE: 5/23/2023  1:42 PM  OBERVATION ADMISSION DATE:   INPATIENT ADMISSION DATE: 5/23/23  4:52 PM   DISCHARGE DATE: 5/26/2023  3:46 PM   DISPOSITION:Home/Self Care    IMPORTANT INFORMATION:  Send all requests for admission clinical reviews, approved or denied determinations and any other requests to dedicated fax number below belonging to the campus where the patient is receiving treatment   List of dedicated fax numbers:  [address] DENIALS (Administrative/Medical Necessity) [phone]   [address] (Maternity/NICU/Pediatrics) [phone]   [facility] [phone]   [place] [address] [phone]   [address] [phone]   220 [facility] [phone]   90 [facility] [phone]   [address] [place] [address] 119 [phone]   [facility]  [phone]   405 [facility] [phone]   412 [facility] [address] [facility] [phone]

## 2023-06-05 ENCOUNTER — APPOINTMENT (OUTPATIENT)
Dept: LAB | Facility: HOSPITAL | Age: 38
End: 2023-06-05
Payer: COMMERCIAL

## 2023-06-10 ENCOUNTER — HOSPITAL ENCOUNTER (INPATIENT)
Facility: HOSPITAL | Age: 38
LOS: 1 days | Discharge: ANOTHER HOSPITAL | DRG: 247 | End: 2023-06-11
Attending: STUDENT IN AN ORGANIZED HEALTH CARE EDUCATION/TRAINING PROGRAM | Admitting: FAMILY MEDICINE
Payer: COMMERCIAL

## 2023-06-10 ENCOUNTER — APPOINTMENT (EMERGENCY)
Dept: CT IMAGING | Facility: HOSPITAL | Age: 38
DRG: 247 | End: 2023-06-10
Payer: COMMERCIAL

## 2023-06-10 ENCOUNTER — APPOINTMENT (INPATIENT)
Dept: CT IMAGING | Facility: HOSPITAL | Age: 38
DRG: 247 | End: 2023-06-10
Payer: COMMERCIAL

## 2023-06-10 VITALS
OXYGEN SATURATION: 97 % | TEMPERATURE: 97.7 F | RESPIRATION RATE: 18 BRPM | DIASTOLIC BLOOD PRESSURE: 68 MMHG | BODY MASS INDEX: 20.61 KG/M2 | HEART RATE: 74 BPM | SYSTOLIC BLOOD PRESSURE: 105 MMHG | WEIGHT: 111.99 LBS | HEIGHT: 62 IN

## 2023-06-10 DIAGNOSIS — K52.9 COLITIS: ICD-10-CM

## 2023-06-10 DIAGNOSIS — R11.2 NAUSEA VOMITING AND DIARRHEA: Primary | ICD-10-CM

## 2023-06-10 DIAGNOSIS — R10.11 RUQ PAIN: ICD-10-CM

## 2023-06-10 DIAGNOSIS — E87.6 HYPOKALEMIA: ICD-10-CM

## 2023-06-10 DIAGNOSIS — K52.9 ENTEROCOLITIS: ICD-10-CM

## 2023-06-10 DIAGNOSIS — R19.7 NAUSEA VOMITING AND DIARRHEA: Primary | ICD-10-CM

## 2023-06-10 DIAGNOSIS — K56.1 INTUSSUSCEPTION (HCC): ICD-10-CM

## 2023-06-10 PROBLEM — Z87.19 HISTORY OF INTUSSUSCEPTION: Status: ACTIVE | Noted: 2022-10-20

## 2023-06-10 PROBLEM — K85.90 ACUTE PANCREATITIS: Status: ACTIVE | Noted: 2021-09-17

## 2023-06-10 LAB
ALBUMIN SERPL BCP-MCNC: 4 G/DL (ref 3.5–5)
ALP SERPL-CCNC: 50 U/L (ref 34–104)
ALT SERPL W P-5'-P-CCNC: 13 U/L (ref 7–52)
AMPHETAMINES SERPL QL SCN: NEGATIVE
ANION GAP SERPL CALCULATED.3IONS-SCNC: 9 MMOL/L (ref 4–13)
AST SERPL W P-5'-P-CCNC: 17 U/L (ref 13–39)
BACTERIA UR QL AUTO: NORMAL /HPF
BARBITURATES UR QL: NEGATIVE
BASOPHILS # BLD AUTO: 0.08 THOUSANDS/ÂΜL (ref 0–0.1)
BASOPHILS NFR BLD AUTO: 1 % (ref 0–1)
BENZODIAZ UR QL: NEGATIVE
BILIRUB SERPL-MCNC: 0.35 MG/DL (ref 0.2–1)
BILIRUB UR QL STRIP: NEGATIVE
BUN SERPL-MCNC: 16 MG/DL (ref 5–25)
CALCIUM SERPL-MCNC: 8.2 MG/DL (ref 8.4–10.2)
CHLORIDE SERPL-SCNC: 108 MMOL/L (ref 96–108)
CLARITY UR: CLEAR
CO2 SERPL-SCNC: 17 MMOL/L (ref 21–32)
COARSE GRAN CASTS URNS QL MICRO: NORMAL /LPF
COCAINE UR QL: NEGATIVE
COLOR UR: YELLOW
CREAT SERPL-MCNC: 1.18 MG/DL (ref 0.6–1.3)
EOSINOPHIL # BLD AUTO: 0.04 THOUSAND/ÂΜL (ref 0–0.61)
EOSINOPHIL NFR BLD AUTO: 0 % (ref 0–6)
ERYTHROCYTE [DISTWIDTH] IN BLOOD BY AUTOMATED COUNT: 16 % (ref 11.6–15.1)
GFR SERPL CREATININE-BSD FRML MDRD: 58 ML/MIN/1.73SQ M
GLUCOSE SERPL-MCNC: 80 MG/DL (ref 65–140)
GLUCOSE UR STRIP-MCNC: NEGATIVE MG/DL
HCT VFR BLD AUTO: 35.7 % (ref 34.8–46.1)
HGB BLD-MCNC: 10.9 G/DL (ref 11.5–15.4)
HGB UR QL STRIP.AUTO: NEGATIVE
IMM GRANULOCYTES # BLD AUTO: 0.04 THOUSAND/UL (ref 0–0.2)
IMM GRANULOCYTES NFR BLD AUTO: 0 % (ref 0–2)
KETONES UR STRIP-MCNC: NEGATIVE MG/DL
LACTATE SERPL-SCNC: 0.6 MMOL/L (ref 0.5–2)
LEUKOCYTE ESTERASE UR QL STRIP: NEGATIVE
LIPASE SERPL-CCNC: 130 U/L (ref 11–82)
LYMPHOCYTES # BLD AUTO: 1.84 THOUSANDS/ÂΜL (ref 0.6–4.47)
LYMPHOCYTES NFR BLD AUTO: 20 % (ref 14–44)
MAGNESIUM SERPL-MCNC: 2.1 MG/DL (ref 1.9–2.7)
MCH RBC QN AUTO: 25.5 PG (ref 26.8–34.3)
MCHC RBC AUTO-ENTMCNC: 30.5 G/DL (ref 31.4–37.4)
MCV RBC AUTO: 84 FL (ref 82–98)
METHADONE UR QL: NEGATIVE
MONOCYTES # BLD AUTO: 0.4 THOUSAND/ÂΜL (ref 0.17–1.22)
MONOCYTES NFR BLD AUTO: 4 % (ref 4–12)
NEUTROPHILS # BLD AUTO: 6.74 THOUSANDS/ÂΜL (ref 1.85–7.62)
NEUTS SEG NFR BLD AUTO: 75 % (ref 43–75)
NITRITE UR QL STRIP: NEGATIVE
NON-SQ EPI CELLS URNS QL MICRO: NORMAL /HPF
NRBC BLD AUTO-RTO: 0 /100 WBCS
OPIATES UR QL SCN: NEGATIVE
OXYCODONE+OXYMORPHONE UR QL SCN: NEGATIVE
PCP UR QL: NEGATIVE
PH UR STRIP.AUTO: 5.5 [PH]
PLATELET # BLD AUTO: 408 THOUSANDS/UL (ref 149–390)
PMV BLD AUTO: 10 FL (ref 8.9–12.7)
POTASSIUM SERPL-SCNC: 3.2 MMOL/L (ref 3.5–5.3)
PROCALCITONIN SERPL-MCNC: 133.2 NG/ML
PROT SERPL-MCNC: 7.1 G/DL (ref 6.4–8.4)
PROT UR STRIP-MCNC: ABNORMAL MG/DL
RBC # BLD AUTO: 4.27 MILLION/UL (ref 3.81–5.12)
RBC #/AREA URNS AUTO: NORMAL /HPF
SODIUM SERPL-SCNC: 134 MMOL/L (ref 135–147)
SP GR UR STRIP.AUTO: >=1.03 (ref 1–1.03)
THC UR QL: POSITIVE
UROBILINOGEN UR QL STRIP.AUTO: 0.2 E.U./DL
WBC # BLD AUTO: 9.14 THOUSAND/UL (ref 4.31–10.16)
WBC #/AREA URNS AUTO: NORMAL /HPF

## 2023-06-10 PROCEDURE — 83690 ASSAY OF LIPASE: CPT | Performed by: PHYSICIAN ASSISTANT

## 2023-06-10 PROCEDURE — G1004 CDSM NDSC: HCPCS

## 2023-06-10 PROCEDURE — 83735 ASSAY OF MAGNESIUM: CPT | Performed by: PHYSICIAN ASSISTANT

## 2023-06-10 PROCEDURE — 96375 TX/PRO/DX INJ NEW DRUG ADDON: CPT

## 2023-06-10 PROCEDURE — 85025 COMPLETE CBC W/AUTO DIFF WBC: CPT | Performed by: PHYSICIAN ASSISTANT

## 2023-06-10 PROCEDURE — 96361 HYDRATE IV INFUSION ADD-ON: CPT

## 2023-06-10 PROCEDURE — 99284 EMERGENCY DEPT VISIT MOD MDM: CPT

## 2023-06-10 PROCEDURE — 80053 COMPREHEN METABOLIC PANEL: CPT | Performed by: PHYSICIAN ASSISTANT

## 2023-06-10 PROCEDURE — 96374 THER/PROPH/DIAG INJ IV PUSH: CPT

## 2023-06-10 PROCEDURE — 99223 1ST HOSP IP/OBS HIGH 75: CPT | Performed by: FAMILY MEDICINE

## 2023-06-10 PROCEDURE — 81001 URINALYSIS AUTO W/SCOPE: CPT | Performed by: PHYSICIAN ASSISTANT

## 2023-06-10 PROCEDURE — 84145 PROCALCITONIN (PCT): CPT | Performed by: PHYSICIAN ASSISTANT

## 2023-06-10 PROCEDURE — 74176 CT ABD & PELVIS W/O CONTRAST: CPT

## 2023-06-10 PROCEDURE — 99255 IP/OBS CONSLTJ NEW/EST HI 80: CPT | Performed by: SURGERY

## 2023-06-10 PROCEDURE — 36415 COLL VENOUS BLD VENIPUNCTURE: CPT | Performed by: FAMILY MEDICINE

## 2023-06-10 PROCEDURE — 80307 DRUG TEST PRSMV CHEM ANLYZR: CPT | Performed by: PHYSICIAN ASSISTANT

## 2023-06-10 PROCEDURE — 74177 CT ABD & PELVIS W/CONTRAST: CPT

## 2023-06-10 PROCEDURE — 87040 BLOOD CULTURE FOR BACTERIA: CPT | Performed by: FAMILY MEDICINE

## 2023-06-10 PROCEDURE — 83605 ASSAY OF LACTIC ACID: CPT | Performed by: PHYSICIAN ASSISTANT

## 2023-06-10 RX ORDER — SODIUM CHLORIDE, SODIUM LACTATE, POTASSIUM CHLORIDE, CALCIUM CHLORIDE 600; 310; 30; 20 MG/100ML; MG/100ML; MG/100ML; MG/100ML
125 INJECTION, SOLUTION INTRAVENOUS CONTINUOUS
Status: DISCONTINUED | OUTPATIENT
Start: 2023-06-10 | End: 2023-06-11 | Stop reason: HOSPADM

## 2023-06-10 RX ORDER — POTASSIUM CHLORIDE 14.9 MG/ML
20 INJECTION INTRAVENOUS
Status: COMPLETED | OUTPATIENT
Start: 2023-06-10 | End: 2023-06-10

## 2023-06-10 RX ORDER — ONDANSETRON 2 MG/ML
4 INJECTION INTRAMUSCULAR; INTRAVENOUS ONCE
Status: COMPLETED | OUTPATIENT
Start: 2023-06-10 | End: 2023-06-10

## 2023-06-10 RX ORDER — CHOLESTYRAMINE LIGHT 4 G/5.7G
4 POWDER, FOR SUSPENSION ORAL 2 TIMES DAILY
Status: DISCONTINUED | OUTPATIENT
Start: 2023-06-10 | End: 2023-06-10

## 2023-06-10 RX ORDER — ENOXAPARIN SODIUM 100 MG/ML
40 INJECTION SUBCUTANEOUS DAILY
Status: DISCONTINUED | OUTPATIENT
Start: 2023-06-11 | End: 2023-06-11 | Stop reason: HOSPADM

## 2023-06-10 RX ORDER — MORPHINE SULFATE 4 MG/ML
4 INJECTION, SOLUTION INTRAMUSCULAR; INTRAVENOUS ONCE
Status: COMPLETED | OUTPATIENT
Start: 2023-06-10 | End: 2023-06-10

## 2023-06-10 RX ORDER — ONDANSETRON 2 MG/ML
4 INJECTION INTRAMUSCULAR; INTRAVENOUS EVERY 6 HOURS PRN
Status: DISCONTINUED | OUTPATIENT
Start: 2023-06-10 | End: 2023-06-11 | Stop reason: HOSPADM

## 2023-06-10 RX ORDER — CIPROFLOXACIN 2 MG/ML
400 INJECTION, SOLUTION INTRAVENOUS EVERY 12 HOURS
Status: DISCONTINUED | OUTPATIENT
Start: 2023-06-10 | End: 2023-06-11 | Stop reason: HOSPADM

## 2023-06-10 RX ORDER — METRONIDAZOLE 500 MG/100ML
500 INJECTION, SOLUTION INTRAVENOUS EVERY 8 HOURS
Status: DISCONTINUED | OUTPATIENT
Start: 2023-06-10 | End: 2023-06-11 | Stop reason: HOSPADM

## 2023-06-10 RX ORDER — HYDROMORPHONE HCL/PF 1 MG/ML
0.5 SYRINGE (ML) INJECTION EVERY 4 HOURS PRN
Status: DISCONTINUED | OUTPATIENT
Start: 2023-06-10 | End: 2023-06-11 | Stop reason: HOSPADM

## 2023-06-10 RX ADMIN — MORPHINE SULFATE 4 MG: 4 INJECTION INTRAVENOUS at 15:51

## 2023-06-10 RX ADMIN — IOHEXOL 100 ML: 350 INJECTION, SOLUTION INTRAVENOUS at 15:34

## 2023-06-10 RX ADMIN — SODIUM CHLORIDE 1000 ML: 0.9 INJECTION, SOLUTION INTRAVENOUS at 14:20

## 2023-06-10 RX ADMIN — SODIUM CHLORIDE, SODIUM LACTATE, POTASSIUM CHLORIDE, AND CALCIUM CHLORIDE 125 ML/HR: .6; .31; .03; .02 INJECTION, SOLUTION INTRAVENOUS at 18:14

## 2023-06-10 RX ADMIN — POTASSIUM CHLORIDE 20 MEQ: 14.9 INJECTION, SOLUTION INTRAVENOUS at 19:52

## 2023-06-10 RX ADMIN — ONDANSETRON 4 MG: 2 INJECTION INTRAMUSCULAR; INTRAVENOUS at 14:21

## 2023-06-10 RX ADMIN — HYDROMORPHONE HYDROCHLORIDE 0.5 MG: 1 INJECTION, SOLUTION INTRAMUSCULAR; INTRAVENOUS; SUBCUTANEOUS at 18:06

## 2023-06-10 RX ADMIN — CIPROFLOXACIN 400 MG: 2 INJECTION, SOLUTION INTRAVENOUS at 18:14

## 2023-06-10 RX ADMIN — MORPHINE SULFATE 4 MG: 4 INJECTION INTRAVENOUS at 14:22

## 2023-06-10 RX ADMIN — POTASSIUM CHLORIDE 20 MEQ: 14.9 INJECTION, SOLUTION INTRAVENOUS at 18:15

## 2023-06-10 RX ADMIN — ONDANSETRON 4 MG: 2 INJECTION INTRAMUSCULAR; INTRAVENOUS at 21:35

## 2023-06-10 RX ADMIN — HYDROMORPHONE HYDROCHLORIDE 0.5 MG: 1 INJECTION, SOLUTION INTRAMUSCULAR; INTRAVENOUS; SUBCUTANEOUS at 22:00

## 2023-06-10 RX ADMIN — METRONIDAZOLE 500 MG: 500 INJECTION, SOLUTION INTRAVENOUS at 19:52

## 2023-06-10 RX ADMIN — ONDANSETRON 4 MG: 2 INJECTION INTRAMUSCULAR; INTRAVENOUS at 17:31

## 2023-06-10 NOTE — ASSESSMENT & PLAN NOTE
Acute on chronic  Elevated lipase and procalcitonin  Keep patient n p o , IV hydration pain management  In previous admission, GI consulted, recommendation was outpatient MRCP/EUS

## 2023-06-10 NOTE — ED PROVIDER NOTES
History  Chief Complaint   Patient presents with   • Abdominal Pain     Pt arrives reporting right flank pain with nausea and vomiting and diarrhea since yesterday  Pt denies trouble with urination  Pt has port for chronic low potassium levels  Patient is a 65-year-old female, has medical history of pancreatitis, chronic hypokalemia, current port for potassium infusions, who presents to the emergency department today with a chief complaint of right upper quadrant right flank pain since yesterday  Patient states that she began having nausea vomiting diarrhea since yesterday  It has worsened today  Pain is constant right upper quadrant and right flank, stabbing 9 out of 10 sensation  Patient states that the pain is made worse with food intake  She attempted to drink liquids at home without relief  Denies any fevers chills, falls or traumas, cough congestion, chest pain, dysuria hematuria  Patient denies any recent antibiotic use  Attempted zofran without relief     surgical history of lap Louise, appendectomy, hysterectomy, gastric bypass in 2014  Admitted approximately 2 weeks ago for 3 days secondary to pancreatitis and acute intussusception, and various electrolyte abnormalities        Abdominal Pain  Pain location:  RUQ and R flank  Pain quality: shooting and stabbing    Pain radiates to:  R flank  Pain severity:  Severe  Timing:  Constant  Progression:  Worsening  Chronicity:  New  Relieved by:  Nothing  Worsened by:  Eating  Ineffective treatments:  Antacids, OTC medications, not moving, lying down and liquids  Associated symptoms: anorexia, diarrhea and vomiting    Associated symptoms: no belching, no chest pain, no chills, no constipation, no dysuria, no fatigue, no fever, no flatus, no hematuria, no shortness of breath and no vaginal bleeding    Diarrhea:     Quality:  Semi-solid and watery    Timing:  Constant    Progression:  Unchanged  Vomiting:     Quality:  Stomach contents and "undigested food    Severity:  Moderate    Timing:  Constant    Progression:  Unchanged      Prior to Admission Medications   Prescriptions Last Dose Informant Patient Reported? Taking? AMILoride 5 mg tablet  Self No No   Sig: Take 1 tablet (5 mg total) by mouth daily   B Complex Vitamins (VITAMIN B COMPLEX PO)   Yes No   Sig: Take 1 capsule by mouth daily     Catheters MISC   No No   Sig: by Does not apply route 2 (two) times a week [facility], Disp, (SYRINGE 3CC/25GX5/8\") 25G X 5/8\" 3 ML MISC   No No   Sig: Use once monthly for B12 injection     amitriptyline (ELAVIL) 10 mg tablet   No No   Sig: Take 2 tablets (20 mg total) by mouth daily at bedtime   cholestyramine sugar free (QUESTRAN LIGHT) 4 g packet   No No   Sig: Take 1 packet (4 g total) by mouth 2 (two) times a day for 3 days   cyanocobalamin 1,000 mcg/mL   No No   Sig: Inject 1 mL (1,000 mcg total) into a muscle every 30 (thirty) days Next dose due 2023   dicyclomine (BENTYL) 20 mg tablet   No No   Sig: Take 1 tablet (20 mg total) by mouth 4 (four) times a day as needed (cramping) for up to 3 days   ergocalciferol (ERGOCALCIFEROL) 1 25 MG (29527 UT) capsule   No No   Sig: Take 1 capsule (50,000 Units total) by mouth once a week  and    escitalopram (LEXAPRO) 20 mg tablet   Yes No   Si mg daily at bedtime   ondansetron (ZOFRAN) 4 mg tablet   No No   Sig: Take 1 tablet (4 mg total) by mouth every 8 (eight) hours as needed for nausea or vomiting   potassium chloride 0 4 mEq/mL   No No   Sig: Inject 200 mL (80 mEq total) into a catheter in a vein over 8 hours at 25 mL/hr daily 80 meq in 1 litre bag to be infused daily over 8 hours   Continue as ordered prior to admission   tamsulosin (FLOMAX) 0 4 mg   No No   Sig: TAKE ONE CAPSULE BY MOUTH IN THE MORNING      Facility-Administered Medications: None       Past Medical History:   Diagnosis Date   • C  difficile colitis 2016   • COVID-19     2022- pt " "denies long covid   • DUB (dysfunctional uterine bleeding)    • H  pylori infection    • Hypertension    • Hypokalemia    • Kidney stone 2023   • Left lower quadrant abdominal pain 2020   • Migraine    • Pancreatitis    • Psychiatric disorder     Anxiety   • Rectal bleeding    • Renal disorder    • Rhabdomyolysis    • Thrombosed external hemorrhoid        Past Surgical History:   Procedure Laterality Date   • ABDOMINAL SURGERY     • APPENDECTOMY     •  SECTION     • CHOLECYSTECTOMY     • COSMETIC SURGERY      \"tummy tuck\"   • FL GUIDED CENTRAL VENOUS ACCESS DEVICE INSERTION  2018   • FL RETROGRADE PYELOGRAM  1/3/2023   • GASTRIC BYPASS     • HYSTERECTOMY     • LAPAROSCOPY     • LA ANRCT XM SURG REQ ANES GENERAL SPI/EDRL DX N/A 2021    Procedure: ANAL EXAM UNDER ANESTHESIA; HEMORRHOIDECTOMY;  Surgeon: Leticia Pineda DO;  Location: [facility];  Service: General   • LA CYSTO/URETERO W/LITHOTRIPSY &INDWELL STENT INSRT Left 1/3/2023    Procedure: CYSTOSCOPY URETEROSCOPY WITH RETROGRADE PYELOGRAM, BASKET STONE EXTRACTION AND INSERTION STENT URETERAL;  Surgeon: Sherron Handley MD;  Location: [facility];  Service: Urology   • LA EXC THROMBOSED HEMORRHOID Tata Nearing N/A 2022    Procedure: EXCISION OF THROMBOSED EXTERNAL HEMORRHOID, Excision of perianal skin tag, dranage of perianal abscess, anal fistulatomy;  Surgeon: Leticia Pineda DO;  Location: [facility];  Service: General   • TUNNELED VENOUS PORT PLACEMENT N/A 2018    Procedure: INSERTION VENOUS PORT (PORT-A-CATH); Surgeon: Adrián Cruz DO;  Location: [facility];  Service: General       Family History   Problem Relation Age of Onset   • No Known Problems Mother    • Hypertension Father    • Diabetes Father    • Urolithiasis Father    • Prostate cancer Father    • Diabetes Maternal Grandfather      I have reviewed and agree with the history as documented      E-Cigarette/Vaping   • E-Cigarette Use Never User " E-Cigarette/Vaping Substances   • Nicotine No    • THC No    • CBD No    • Flavoring No    • Other No    • Unknown No      Social History     Tobacco Use   • Smoking status: Never   • Smokeless tobacco: Never   Vaping Use   • Vaping Use: Never used   Substance Use Topics   • Alcohol use: Yes     Comment: rare   • Drug use: Yes     Types: Marijuana     Comment: Medical marijuana       Review of Systems   Constitutional: Negative for chills, fatigue and fever  Respiratory: Negative for shortness of breath  Cardiovascular: Negative for chest pain  Gastrointestinal: Positive for abdominal pain, anorexia, diarrhea and vomiting  Negative for constipation and flatus  Genitourinary: Negative for dysuria, hematuria and vaginal bleeding  All other systems reviewed and are negative  Physical Exam  Physical Exam  Vitals and nursing note reviewed  Constitutional:       General: She is not in acute distress  Appearance: She is well-developed  HENT:      Head: Normocephalic and atraumatic  Right Ear: External ear normal       Left Ear: External ear normal       Mouth/Throat:      Mouth: Mucous membranes are dry  Eyes:      Extraocular Movements: Extraocular movements intact  Pupils: Pupils are equal, round, and reactive to light  Cardiovascular:      Rate and Rhythm: Regular rhythm  Tachycardia present  Heart sounds: No murmur heard  Pulmonary:      Effort: Pulmonary effort is normal  No respiratory distress  Breath sounds: Normal breath sounds  No wheezing  Abdominal:      General: Bowel sounds are normal       Palpations: Abdomen is soft  There is no mass  Tenderness: There is abdominal tenderness in the right upper quadrant and epigastric area  There is no right CVA tenderness, left CVA tenderness or rebound  Hernia: No hernia is present  Musculoskeletal:      Cervical back: Normal range of motion and neck supple  Skin:     General: Skin is warm and dry  Capillary Refill: Capillary refill takes less than 2 seconds  Neurological:      General: No focal deficit present  Mental Status: She is alert and oriented to person, place, and time        Coordination: Coordination normal    Psychiatric:         Behavior: Behavior normal          Vital Signs  ED Triage Vitals [06/10/23 1343]   Temperature Pulse Respirations Blood Pressure SpO2   97 5 °F (36 4 °C) 90 18 120/76 100 %      Temp src Heart Rate Source Patient Position - Orthostatic VS BP Location FiO2 (%)   -- -- -- -- --      Pain Score       8           Vitals:    06/10/23 1343 06/10/23 1744   BP: 120/76 127/81   Pulse: 90 69         Visual Acuity      ED Medications  Medications   lactated ringers infusion (125 mL/hr Intravenous New Bag 6/10/23 1814)   ondansetron (ZOFRAN) injection 4 mg (has no administration in time range)   enoxaparin (LOVENOX) subcutaneous injection 40 mg (has no administration in time range)   ciprofloxacin (CIPRO) IVPB (premix in 5% dextrose) 400 mg 200 mL (400 mg Intravenous New Bag 6/10/23 1814)   metroNIDAZOLE (FLAGYL) IVPB (premix) 500 mg 100 mL (has no administration in time range)   potassium chloride 20 mEq IVPB (premix) (20 mEq Intravenous New Bag 6/10/23 1815)   HYDROmorphone (DILAUDID) injection 0 5 mg (0 5 mg Intravenous Given 6/10/23 1806)   naloxone (NARCAN) 0 04 mg/mL syringe 0 04 mg (has no administration in time range)   sodium chloride 0 9 % bolus 1,000 mL (0 mL Intravenous Stopped 6/10/23 1520)   ondansetron (ZOFRAN) injection 4 mg (4 mg Intravenous Given 6/10/23 1421)   morphine injection 4 mg (4 mg Intravenous Given 6/10/23 1422)   morphine injection 4 mg (4 mg Intravenous Given 6/10/23 1551)   iohexol (OMNIPAQUE) 350 MG/ML injection (SINGLE-DOSE) 100 mL (100 mL Intravenous Given 6/10/23 1534)   ondansetron (ZOFRAN) injection 4 mg (4 mg Intravenous Given 6/10/23 1731)       Diagnostic Studies  Results Reviewed     Procedure Component Value Units Date/Time    Blood culture #1 [[id number]] Collected: 06/10/23 1731    Lab Status: In process Specimen: Blood from Central Venous Line Updated: 06/10/23 1738    Blood culture #2 [[id number]] Collected: 06/10/23 1731    Lab Status: In process Specimen: Blood from Hand, Right Updated: 06/10/23 1737    Clostridium difficile toxin by PCR with EIA [[id number]]     Lab Status: No result Specimen: Stool from Per Rectum     Stool Enteric Bacterial Panel by PCR [[id number]]     Lab Status: No result Specimen: Stool from Per Rectum     Procalcitonin [[id number]]  (Abnormal) Collected: 06/10/23 1419    Lab Status: Final result Specimen: Blood from Central Venous Line Updated: 06/10/23 1519     Procalcitonin 133 20 ng/ml     Urine Microscopic [[id number]] Collected: 06/10/23 1452    Lab Status: Final result Specimen: Urine, Clean Catch Updated: 06/10/23 1514     RBC, UA None Seen /hpf      WBC, UA None Seen /hpf      Epithelial Cells Occasional /hpf      Bacteria, UA None Seen /hpf      COARSE GRANULAR CASTS 1-2 /lpf     Rapid drug screen, urine [[id number]]  (Abnormal) Collected: 06/10/23 1452    Lab Status: Final result Specimen: Urine, Clean Catch Updated: 06/10/23 1508     Amph/Meth UR Negative     Barbiturate Ur Negative     Benzodiazepine Urine Negative     Cocaine Urine Negative     Methadone Urine Negative     Opiate Urine Negative     PCP Ur Negative     THC Urine Positive     Oxycodone Urine Negative    Narrative:      Presumptive report  If requested, specimen will be sent to reference lab for confirmation  FOR MEDICAL PURPOSES ONLY  IF CONFIRMATION NEEDED PLEASE CONTACT THE LAB WITHIN 5 DAYS      Drug Screen Cutoff Levels:  AMPHETAMINE/METHAMPHETAMINES  1000 ng/mL  BARBITURATES     200 ng/mL  BENZODIAZEPINES     200 ng/mL  COCAINE      300 ng/mL  METHADONE      300 ng/mL  OPIATES      300 ng/mL  PHENCYCLIDINE     25 ng/mL  THC       50 ng/mL  OXYCODONE      100 ng/mL    UA w Reflex to Microscopic w Reflex to Culture [[id number]]  (Abnormal) Collected: 06/10/23 1452    Lab Status: Final result Specimen: Urine, Clean Catch Updated: 06/10/23 1504     Color, UA Yellow     Clarity, UA Clear     Specific Gravity, UA >=1 030     pH, UA 5 5     Leukocytes, UA Negative     Nitrite, UA Negative     Protein,  (2+) mg/dl      Glucose, UA Negative mg/dl      Ketones, UA Negative mg/dl      Urobilinogen, UA 0 2 E U /dl      Bilirubin, UA Negative     Occult Blood, UA Negative    Comprehensive metabolic panel [[id number]]  (Abnormal) Collected: 06/10/23 1419    Lab Status: Final result Specimen: Blood from Central Venous Line Updated: 06/10/23 1449     Sodium 134 mmol/L      Potassium 3 2 mmol/L      Chloride 108 mmol/L      CO2 17 mmol/L      ANION GAP 9 mmol/L      BUN 16 mg/dL      Creatinine 1 18 mg/dL      Glucose 80 mg/dL      Calcium 8 2 mg/dL      AST 17 U/L      ALT 13 U/L      Alkaline Phosphatase 50 U/L      Total Protein 7 1 g/dL      Albumin 4 0 g/dL      Total Bilirubin 0 35 mg/dL      eGFR 58 ml/min/1 73sq m     Narrative:      [place] guidelines for Chronic Kidney Disease (CKD):   •  Stage 1 with normal or high GFR (GFR > 90 mL/min/1 73 square meters)  •  Stage 2 Mild CKD (GFR = 60-89 mL/min/1 73 square meters)  •  Stage 3A Moderate CKD (GFR = 45-59 mL/min/1 73 square meters)  •  Stage 3B Moderate CKD (GFR = 30-44 mL/min/1 73 square meters)  •  Stage 4 Severe CKD (GFR = 15-29 mL/min/1 73 square meters)  •  Stage 5 End Stage CKD (GFR <15 mL/min/1 73 square meters)  Note: GFR calculation is accurate only with a steady state creatinine    Magnesium [[id number]]  (Normal) Collected: 06/10/23 1419    Lab Status: Final result Specimen: Blood from Central Venous Line Updated: 06/10/23 1449     Magnesium 2 1 mg/dL     Lipase [[id number]]  (Abnormal) Collected: 06/10/23 1419    Lab Status: Final result Specimen: Blood from Central Venous Line Updated: 06/10/23 1449     Lipase 130 u/L     Lactic acid, plasma (w/reflex if result > 2 0) [[id number]]  (Normal) Collected: 06/10/23 1419    Lab Status: Final result Specimen: Blood from Central Venous Line Updated: 06/10/23 1449     LACTIC ACID 0 6 mmol/L     Narrative:      Result may be elevated if tourniquet was used during collection  CBC and differential [[id number]]  (Abnormal) Collected: 06/10/23 1419    Lab Status: Final result Specimen: Blood from Central Venous Line Updated: 06/10/23 1429     WBC 9 14 Thousand/uL      RBC 4 27 Million/uL      Hemoglobin 10 9 g/dL      Hematocrit 35 7 %      MCV 84 fL      MCH 25 5 pg      MCHC 30 5 g/dL      RDW 16 0 %      MPV 10 0 fL      Platelets 662 Thousands/uL      nRBC 0 /100 WBCs      Neutrophils Relative 75 %      Immat GRANS % 0 %      Lymphocytes Relative 20 %      Monocytes Relative 4 %      Eosinophils Relative 0 %      Basophils Relative 1 %      Neutrophils Absolute 6 74 Thousands/µL      Immature Grans Absolute 0 04 Thousand/uL      Lymphocytes Absolute 1 84 Thousands/µL      Monocytes Absolute 0 40 Thousand/µL      Eosinophils Absolute 0 04 Thousand/µL      Basophils Absolute 0 08 Thousands/µL                  CT abdomen pelvis with contrast   Final Result by Christiano Callejas MD (06/10 1648)      Diffuse mucosal enhancement of the colon suggestive of colitis  Consider colonoscopy follow-up  Incidental small bowel intussusception, likely transient  The study was marked in [facility] for immediate notification  Workstation performed: [id number]         CT abdomen pelvis wo contrast    (Results Pending)              Procedures  Procedures         ED Course  ED Course as of 06/10/23 1945   Sat Ji 10, 2023   1459 Lipase(!): 130   1531 THC URINE(!): Positive   1711 Patient not tolerating PO   Patient still having significant pain after morpine                                              Medical Decision Making  Patient is a 69-year-old female, has medical history of pancreatitis, chronic hypokalemia, current port for potassium infusions, who presents to the emergency department today with a chief complaint of right upper quadrant right flank pain since yesterday  Patient states that she began having nausea vomiting diarrhea since yesterday  It has worsened today  Pain is constant right upper quadrant and right flank, stabbing 9 out of 10 sensation  Patient states that the pain is made worse with food intake  She attempted to drink liquids at home without relief  Denies any fevers chills, falls or traumas, cough congestion, chest pain, dysuria hematuria  Patient denies any recent antibiotic use  Attempted zofran without relief     H/o lap river, appendectomy, hysterectomy, gastric bypass in 2014  Differential diagnosis was included but not limited to: GERD, gastritis, PUD, esophageal spasm, pancreatitis, acute cystitis, renal colic, kidney stone, MSK pain, AAA, urinary retention, colitis, diverticulitis, constipation, proctitis, small bowel obstruction, large bowel obstruction, mass, viral syndrome    Patient on physical examination seems uncomfortable was tender in the right upper quadrant and the gastric area of her abdomen  No rebound or guarding  Patient was afebrile  Having nausea and vomiting  Patient on lab work was found to have hypokalemia despite receiving her infusions daily  Patient's lipase was also elevated  In previous admission patient was diagnosed with pancreatitis although CT scan did not take pancreatitis  Today's CT scan did not illustrate pancreatitis  Did show colitis  Patient was not tolerating p o  in the emergency department  Patient was having severe pain  Therefore spoke with hospitalist service for admission  Admitted approximately 2 weeks ago for 3 days secondary to pancreatitis and acute intussusception, and various electrolyte abnormalities  Amount and/or Complexity of Data Reviewed  External Data Reviewed: labs and notes  Labs: ordered   Decision-making details documented in ED Course  Radiology: ordered  Decision-making details documented in ED Course  Risk  Prescription drug management  Decision regarding hospitalization            Disposition  Final diagnoses:   Nausea vomiting and diarrhea   Intussusception (HCC)   RUQ pain   Hypokalemia   Colitis     Time reflects when diagnosis was documented in both MDM as applicable and the Disposition within this note     Time User Action Codes Description Comment    6/10/2023  5:07 PM Darnelle Pooja Add [R11 2,  R19 7] Nausea vomiting and diarrhea     6/10/2023  5:07 PM Darnelle Pooja Add [K56 1] Intussusception ([address] )     6/10/2023  5:07 PM Darnelle Pooja Add [R10 11] RUQ pain     6/10/2023  5:10 PM Darnelle Pooja Add [E87 6] Hypokalemia     6/10/2023  5:11 PM Darnelle Pooja Add [K52 9] Colitis     6/10/2023  5:18 PM Dominik Eldridge Add [K52 9] Enterocolitis       ED Disposition     ED Disposition   Admit    Condition   Stable    Date/Time   Sat Ji 10, 2023  5:10 PM    Comment   Case was discussed with cesar  and the patient's admission status was agreed to be Admission Status: inpatient status to the service of Dr Kaur Danger              Follow-up Information    None         Current Discharge Medication List      CONTINUE these medications which have NOT CHANGED    Details   AMILoride 5 mg tablet Take 1 tablet (5 mg total) by mouth daily  Qty: 30 tablet, Refills: 0    Associated Diagnoses: Hypokalemia      amitriptyline (ELAVIL) 10 mg tablet Take 2 tablets (20 mg total) by mouth daily at bedtime  Qty: 60 tablet, Refills: 0    Associated Diagnoses: Migraine      B Complex Vitamins (VITAMIN B COMPLEX PO) Take 1 capsule by mouth daily        Catheters MISC by Does not apply route 2 (two) times a week Port care per [employer] protocol  Qty: 999 each, Refills: 11    Associated Diagnoses: Hypokalemia      cholestyramine sugar free (QUESTRAN LIGHT) 4 g packet Take 1 packet (4 g total) by mouth 2 (two) times a day for 3 days  Qty: 6 packet, "Refills: 0    Associated Diagnoses: Left lower quadrant abdominal pain      cyanocobalamin 1,000 mcg/mL Inject 1 mL (1,000 mcg total) into a muscle every 30 (thirty) days Next dose due 5/1/2023    Associated Diagnoses: B12 deficiency      dicyclomine (BENTYL) 20 mg tablet Take 1 tablet (20 mg total) by mouth 4 (four) times a day as needed (cramping) for up to 3 days  Qty: 6 tablet, Refills: 0    Associated Diagnoses: Left lower quadrant abdominal pain      ergocalciferol (ERGOCALCIFEROL) 1 25 MG (33957 UT) capsule Take 1 capsule (50,000 Units total) by mouth once a week Mondays and Thursdays    Associated Diagnoses: Vitamin D insufficiency      escitalopram (LEXAPRO) 20 mg tablet 20 mg daily at bedtime      ondansetron (ZOFRAN) 4 mg tablet Take 1 tablet (4 mg total) by mouth every 8 (eight) hours as needed for nausea or vomiting  Qty: 20 tablet, Refills: 0    Associated Diagnoses: Intractable vomiting      potassium chloride 0 4 mEq/mL Inject 200 mL (80 mEq total) into a catheter in a vein over 8 hours at 25 mL/hr daily 80 meq in 1 litre bag to be infused daily over 8 hours   Continue as ordered prior to admission  Qty: 2800 mL, Refills: 0    Associated Diagnoses: Hypokalemia      Syringe/Needle, Disp, (SYRINGE 3CC/25GX5/8\") 25G X 5/8\" 3 ML MISC Use once monthly for B12 injection  Qty: 1 each, Refills: 11    Associated Diagnoses: B12 deficiency      tamsulosin (FLOMAX) 0 4 mg TAKE ONE CAPSULE BY MOUTH IN THE MORNING  Qty: 30 capsule, Refills: 0    Associated Diagnoses: Nephrolithiasis             No discharge procedures on file      PDMP Review       Value Time User    PDMP Reviewed  Yes 6/10/2023  5:16 PM Rolf Wiggins MD          ED Provider  Electronically Signed by           Vivian Whatley PA-C  06/10/23 1945    "

## 2023-06-10 NOTE — PLAN OF CARE
Problem: PAIN - ADULT  Goal: Verbalizes/displays adequate comfort level or baseline comfort level  Description: Interventions:  - Encourage patient to monitor pain and request assistance  - Assess pain using appropriate pain scale  - Administer analgesics based on type and severity of pain and evaluate response  - Implement non-pharmacological measures as appropriate and evaluate response  - Consider cultural and social influences on pain and pain management  - Notify physician/advanced practitioner if interventions unsuccessful or patient reports new pain  Outcome: Progressing     Problem: INFECTION - ADULT  Goal: Absence or prevention of progression during hospitalization  Description: INTERVENTIONS:  - Assess and monitor for signs and symptoms of infection  - Monitor lab/diagnostic results  - Monitor all insertion sites, i e  indwelling lines, tubes, and drains  - Monitor endotracheal if appropriate and nasal secretions for changes in amount and color  - [place] appropriate cooling/warming therapies per order  - Administer medications as ordered  - Instruct and encourage patient and family to use good hand hygiene technique  - Identify and instruct in appropriate isolation precautions for identified infection/condition  Outcome: Progressing  Goal: Absence of fever/infection during neutropenic period  Description: INTERVENTIONS:  - Monitor WBC    Outcome: Progressing     Problem: SAFETY ADULT  Goal: Patient will remain free of falls  Description: INTERVENTIONS:  - Educate patient/family on patient safety including physical limitations  - Instruct patient to call for assistance with activity   - Consult OT/PT to assist with strengthening/mobility   - Keep Call bell within reach  - Keep bed low and locked with side rails adjusted as appropriate  - Keep care items and personal belongings within reach  - Initiate and maintain comfort rounds  - Make Fall Risk Sign visible to staff  - Apply yellow socks and bracelet for high fall risk patients  - Consider moving patient to room near nurses station  Outcome: Progressing     Problem: DISCHARGE PLANNING  Goal: Discharge to home or other facility with appropriate resources  Description: INTERVENTIONS:  - Identify barriers to discharge w/patient and caregiver  - Arrange for needed discharge resources and transportation as appropriate  - Identify discharge learning needs (meds, wound care, etc )  - Arrange for interpretive services to assist at discharge as needed  - Refer to Case Management Department for coordinating discharge planning if the patient needs post-hospital services based on physician/advanced practitioner order or complex needs related to functional status, cognitive ability, or social support system  Outcome: Progressing     Problem: Knowledge Deficit  Goal: Patient/family/caregiver demonstrates understanding of disease process, treatment plan, medications, and discharge instructions  Description: Complete learning assessment and assess knowledge base    Interventions:  - Provide teaching at level of understanding  - Provide teaching via preferred learning methods  Outcome: Progressing     Problem: GASTROINTESTINAL - ADULT  Goal: Minimal or absence of nausea and/or vomiting  Description: INTERVENTIONS:  - Administer IV fluids if ordered to ensure adequate hydration  - Maintain NPO status until nausea and vomiting are resolved  - Nasogastric tube if ordered  - Administer ordered antiemetic medications as needed  - Provide nonpharmacologic comfort measures as appropriate  - Advance diet as tolerated, if ordered  - Consider nutrition services referral to assist patient with adequate nutrition and appropriate food choices  Outcome: Progressing  Goal: Maintains or returns to baseline bowel function  Description: INTERVENTIONS:  - Assess bowel function  - Encourage oral fluids to ensure adequate hydration  - Administer IV fluids if ordered to ensure adequate hydration  - Administer ordered medications as needed  - Encourage mobilization and activity  - Consider nutritional services referral to assist patient with adequate nutrition and appropriate food choices  Outcome: Progressing  Goal: Maintains adequate nutritional intake  Description: INTERVENTIONS:  - Monitor percentage of each meal consumed  - Identify factors contributing to decreased intake, treat as appropriate  - Assist with meals as needed  - Monitor I&O, weight, and lab values if indicated  - Obtain nutrition services referral as needed  Outcome: Progressing  Goal: Oral mucous membranes remain intact  Description: INTERVENTIONS  - Assess oral mucosa and hygiene practices  - Implement preventative oral hygiene regimen  - Implement oral medicated treatments as ordered  - Initiate Nutrition services referral as needed  Outcome: Progressing     Problem: METABOLIC, FLUID AND ELECTROLYTES - ADULT  Goal: Electrolytes maintained within normal limits  Description: INTERVENTIONS:  - Monitor labs and assess patient for signs and symptoms of electrolyte imbalances  - Administer electrolyte replacement as ordered  - Monitor response to electrolyte replacements, including repeat lab results as appropriate  - Instruct patient on fluid and nutrition as appropriate  Outcome: Progressing  Goal: Fluid balance maintained  Description: INTERVENTIONS:  - Monitor labs   - Monitor I/O and WT  - Instruct patient on fluid and nutrition as appropriate  - Assess for signs & symptoms of volume excess or deficit  Outcome: Progressing  Goal: Glucose maintained within target range  Description: INTERVENTIONS:  - Monitor Blood Glucose as ordered  - Assess for signs and symptoms of hyperglycemia and hypoglycemia  - Administer ordered medications to maintain glucose within target range  - Assess nutritional intake and initiate nutrition service referral as needed  Outcome: Progressing

## 2023-06-10 NOTE — CONSULTS
Consultation - General Surgery   Rosibel Strauss 43 y o  female MRN: [id number]  Unit/Bed#: -01 Encounter: [id number]    Assessment/Plan     Assessment:  The patient is a 58-year-old female with a past medical history significant for morbid obesity status post Roshan-en-Y gastric bypass at [address] [place] [address] in October 2014 who presents to the hospital with a 24-hour history of abdominal pain nausea and vomiting  Here in the emergency room her evaluation has included history and physical examination as well as serum blood work and a CT of the abdomen and pelvis which revealed the presence of a jejunal jejunal intussusception in the left upper quadrant  They suggest that this is an incidental finding, likely transient  Plan:  The patient's history provided is inconsistent with that reflected in the computer record  Given her history for prior Roshan-en-Y gastric bypass I have attempted to contact bariatric surgery at [facility] to discuss the case and look for guidance as to how best to proceed  Given the patient's clinical stability this evening I will advise the patient that we we will plan to observe her overnight and repeat her history, physical examination and lab studies on the morning of Sunday and June 11, 2023 after which we will make additional diagnostic and therapeutic treatment recommendations  History of Present Illness   HPI:  Rosibel Strauss is a 43 y o  female who presents with history of nausea vomiting abdominal pain and an abnormal CAT scan for which admission is now indicated  Inpatient consult to Acute Care Surgery  Consult performed by: Beryle Gitelman, MD  Consult ordered by: Chata Jorgensen MD          Review of Systems   Constitutional: Negative for chills and fever  HENT: Negative for ear pain and sore throat  Eyes: Negative for pain and visual disturbance  Respiratory: Negative for cough and shortness of breath      Cardiovascular: Negative for chest pain "and palpitations  Gastrointestinal: Positive for abdominal pain, nausea and vomiting  Negative for constipation and diarrhea  The patient reports 5-6 hospitalizations for small bowel intussusception in   The patient reports that during 1 of these hospitalizations she underwent surgery for the intussusception  She is unaware as to whether or not she had bowel resected at the time of that operation  She reports that that procedure was performed here at [facility]  A thorough review of the computer record fails to confirm the patient's reported history  Genitourinary: Negative for dysuria and hematuria  Musculoskeletal: Negative for arthralgias and back pain  Skin: Negative for color change and rash  Neurological: Negative for seizures and syncope  All other systems reviewed and are negative        Historical Information   Past Medical History:   Diagnosis Date   • C  difficile colitis 2016   • COVID-19     2022- pt denies long covid   • DUB (dysfunctional uterine bleeding)    • H  pylori infection    • Hypertension    • Hypokalemia    • Kidney stone 2023   • Left lower quadrant abdominal pain 2020   • Migraine    • Pancreatitis    • Psychiatric disorder     Anxiety   • Rectal bleeding    • Renal disorder    • Rhabdomyolysis    • Thrombosed external hemorrhoid      Past Surgical History:   Procedure Laterality Date   • ABDOMINAL SURGERY     • APPENDECTOMY     •  SECTION     • CHOLECYSTECTOMY     • COSMETIC SURGERY      \"tummy tuck\"   • FL GUIDED CENTRAL VENOUS ACCESS DEVICE INSERTION  2018   • FL RETROGRADE PYELOGRAM  1/3/2023   • GASTRIC BYPASS     • HYSTERECTOMY     • LAPAROSCOPY     • LA ANRCT XM SURG REQ ANES GENERAL SPI/EDRL DX N/A 2021    Procedure: ANAL EXAM UNDER ANESTHESIA; HEMORRHOIDECTOMY;  Surgeon: Mercedes Peña DO;  Location:  MAIN OR;  Service: General   • LA CYSTO/URETERO W/LITHOTRIPSY &INDWELL STENT INSRT Left " "1/3/2023    Procedure: CYSTOSCOPY URETEROSCOPY WITH RETROGRADE PYELOGRAM, BASKET STONE EXTRACTION AND INSERTION STENT URETERAL;  Surgeon: Marty Burgess MD;  Location:  MAIN OR;  Service: Urology   • GA EXC THROMBOSED HEMORRHOID [address] N/A 7/8/2022    Procedure: EXCISION OF THROMBOSED EXTERNAL HEMORRHOID, Excision of perianal skin tag, dranage of perianal abscess, anal fistulatomy;  Surgeon: Alana Baldwin DO;  Location:  MAIN OR;  Service: General   • TUNNELED VENOUS PORT PLACEMENT N/A 12/21/2018    Procedure: INSERTION VENOUS PORT (PORT-A-CATH); Surgeon: Allen Russell DO;  Location: [facility];  Service: General     Social History   Social History     Substance and Sexual Activity   Alcohol Use Yes    Comment: rare     Social History     Substance and Sexual Activity   Drug Use Yes   • Types: Marijuana    Comment: Medical marijuana     E-Cigarette/Vaping   • E-Cigarette Use Never User      E-Cigarette/Vaping Substances   • Nicotine No    • THC No    • CBD No    • Flavoring No    • Other No    • Unknown No      Social History     Tobacco Use   Smoking Status Never   Smokeless Tobacco Never     Family History: non-contributory    Meds/Allergies   all current active meds have been reviewed  Allergies   Allergen Reactions   • Nsaids Other (See Comments)     Other reaction(s): Other (Please comment)  Should not take s/p bariatric surgery  Other reaction(s):  Other (See Comments)  Should not take as per prior records  Should not take s/p bariatric surgery  Has hx of gastric bypass     • Prednisone      Nausea, vomiting, diarrhea       Objective   First Vitals:   Blood Pressure: 120/76 (06/10/23 1343)  Pulse: 90 (06/10/23 1343)  Temperature: 97 5 °F (36 4 °C) (06/10/23 1343)  Respirations: 18 (06/10/23 1343)  Height: 5' 2\" (157 5 cm) (06/10/23 1343)  Weight - Scale: 51 7 kg (114 lb) (06/10/23 1343)  SpO2: 100 % (06/10/23 1343)    Current Vitals:   Blood Pressure: 127/81 (06/10/23 1744)  Pulse: 69 " "(06/10/23 1744)  Temperature: (!) 97 3 °F (36 3 °C) (06/10/23 1744)  Respirations: 18 (06/10/23 1744)  Height: 5' 2\" (157 5 cm) (06/10/23 1343)  Weight - Scale: 50 8 kg (111 lb 15 9 oz) (06/10/23 1723)  SpO2: 98 % (06/10/23 1744)      Intake/Output Summary (Last 24 hours) at 6/10/2023 1913  Last data filed at 6/10/2023 1815  Gross per 24 hour   Intake 1000 ml   Output 200 ml   Net 800 ml       Invasive Devices     Central Venous Catheter Line  Duration           Port A Cath Right Chest -- days          Peripheral Intravenous Line  Duration           Peripheral IV 06/10/23 Right;Ventral (anterior) Hand <1 day                Physical Exam  Vitals and nursing note reviewed  Constitutional:       General: She is not in acute distress  Appearance: She is well-developed  Comments: Patient is well-nourished well-appearing female  She appears in no acute distress  She is resting comfortably in her hospital bed  She does appear uncomfortable at times  There is no physical manifestations of extremis or peritonitis that she manifested during her history or physical examination  HENT:      Head: Normocephalic and atraumatic  Nose: Nose normal    Eyes:      Conjunctiva/sclera: Conjunctivae normal    Cardiovascular:      Rate and Rhythm: Normal rate and regular rhythm  Heart sounds: No murmur heard  Pulmonary:      Effort: Pulmonary effort is normal  No respiratory distress  Breath sounds: Normal breath sounds  Abdominal:      Palpations: Abdomen is soft  Tenderness: There is no abdominal tenderness  Comments: Flat nondistended mildly tender to percussion and palpation limited to the right side of the abdomen  No true percussion tenderness or palpable masses on the left side of the abdomen  No guarding rebound or peritoneal signs  Musculoskeletal:         General: No swelling  Cervical back: Neck supple  Skin:     General: Skin is warm and dry        Capillary Refill: " Capillary refill takes less than 2 seconds  Neurological:      Mental Status: She is alert  Psychiatric:         Mood and Affect: Mood normal          Lab Results: I have personally reviewed pertinent lab results  Imaging: I have personally reviewed pertinent reports  EKG, Pathology, and Other Studies: I have personally reviewed pertinent reports  Counseling / Coordination of Care  Total floor / unit time spent today 35 minutes  Greater than 50% of total time was spent with the patient and / or family counseling and / or coordination of care  A description of the counseling / coordination of care: 15

## 2023-06-10 NOTE — ASSESSMENT & PLAN NOTE
• Ct A/P -Diffuse mucosal enhancement of the colon suggestive of colitis  Consider colonoscopy follow-up   Incidental small bowel intussusception, likely transient  • Supportive care   • IVF hydration, pain control, IV antibiotic  • Anti-emetics  • Patient was recently admitted in the hospital, stool studies at that time negative

## 2023-06-10 NOTE — H&P
[address]  H&P  Name: Susan Bynum 43 y o  female I MRN: [id number]  Unit/Bed#: -01 I Date of Admission: 6/10/2023   Date of Service: 6/10/2023 I Hospital Day: 0      Assessment/Plan   Enterocolitis  Assessment & Plan  • Ct A/P -Diffuse mucosal enhancement of the colon suggestive of colitis  Consider colonoscopy follow-up  Incidental small bowel intussusception, likely transient  • Supportive care   • IVF hydration, pain control, IV antibiotic  • Anti-emetics  • Patient was recently admitted in the hospital, stool studies at that time negative      * Intussusception [facility])  Assessment & Plan  • CT A/P -Diffuse mucosal enhancement of the colon suggestive of colitis  Consider colonoscopy follow-up  Incidental small bowel intussusception, likely transient  • Supportive care, gradual advancement of diet  • Pain control prn  • Follow surgery consult    Hyponatremia  Assessment & Plan  Secondary to enterocolitis  Continue IV hydration and recheck    Acute pancreatitis  Assessment & Plan  Acute on chronic  Elevated lipase and procalcitonin  Keep patient n p o , IV hydration pain management  In previous admission, GI consulted, recommendation was outpatient MRCP/EUS    Hypokalemia  Assessment & Plan  Secondary to enterocolitis  Potassium 3 2  Check magnesium  Continue supplement         VTE Pharmacologic Prophylaxis: VTE Score: 3 Moderate Risk (Score 3-4) - Pharmacological DVT Prophylaxis Ordered: enoxaparin (Lovenox)  Code Status: Level 1 - Full Code as per patient  Discussion with family: With patient  Anticipated Length of Stay: Patient will be admitted on an inpatient basis with an anticipated length of stay of greater than 2 midnights secondary to To monitor the condition      Total Time Spent on Date of Encounter in care of patient: 10 minutes This time was spent on one or more of the following: performing physical exam; counseling and coordination of care; obtaining or reviewing history; documenting in the medical record; reviewing/ordering tests, medications or procedures; communicating with other healthcare professionals and discussing with patient's family/caregivers  Chief Complaint: Right flank pain with nausea and vomiting and diarrhea    History of Present Illness:  Rosibel Strauss is a 43 y o  female with a PMH of chronic pancreatitis, chronic hypokalemia who presents with right upper quadrant/flank pain since yesterday  Initially started with nausea vomiting diarrhea  Pain is constant in nature, stabbing 9/10  Aggravates with food intake  Denies any fever, chills, falls, trauma  History of gastric bypass surgery       Review of Systems:  Review of Systems   Constitutional: Positive for activity change and appetite change  Negative for chills, diaphoresis, fatigue, fever and unexpected weight change  HENT: Negative for congestion, mouth sores, nosebleeds, postnasal drip and rhinorrhea  Respiratory: Negative for apnea, choking, chest tightness, shortness of breath and stridor  Cardiovascular: Negative for chest pain and leg swelling  Gastrointestinal: Positive for abdominal pain, diarrhea, nausea and vomiting  Negative for abdominal distention  Genitourinary: Negative for difficulty urinating, dyspareunia and dysuria  Musculoskeletal: Negative for arthralgias, back pain, gait problem, joint swelling and myalgias  Skin: Negative for color change, pallor and wound  Neurological: Negative for dizziness, facial asymmetry, light-headedness and headaches  Psychiatric/Behavioral: Negative for agitation, behavioral problems and confusion  All other systems reviewed and are negative        Past Medical and Surgical History:   Past Medical History:   Diagnosis Date   • C  difficile colitis 2016   • COVID-19     1/2022- pt denies long covid   • DUB (dysfunctional uterine bleeding)    • H  pylori infection    • Hypertension    • Hypokalemia    • Kidney stone November "   • Left lower quadrant abdominal pain 2020   • Migraine    • Pancreatitis    • Psychiatric disorder     Anxiety   • Rectal bleeding    • Renal disorder    • Rhabdomyolysis    • Thrombosed external hemorrhoid        Past Surgical History:   Procedure Laterality Date   • ABDOMINAL SURGERY     • APPENDECTOMY     •  SECTION     • CHOLECYSTECTOMY     • COSMETIC SURGERY      \"tummy tuck\"   • FL GUIDED CENTRAL VENOUS ACCESS DEVICE INSERTION  2018   • FL RETROGRADE PYELOGRAM  1/3/2023   • GASTRIC BYPASS     • HYSTERECTOMY     • LAPAROSCOPY     • CO ANRCT XM SURG REQ ANES GENERAL SPI/EDRL DX N/A 2021    Procedure: ANAL EXAM UNDER ANESTHESIA; HEMORRHOIDECTOMY;  Surgeon: Flavio Page DO;  Location: [facility];  Service: General   • CO CYSTO/URETERO W/LITHOTRIPSY &INDWELL STENT INSRT Left 1/3/2023    Procedure: CYSTOSCOPY URETEROSCOPY WITH RETROGRADE PYELOGRAM, BASKET STONE EXTRACTION AND INSERTION STENT URETERAL;  Surgeon: Kamila Sotelo MD;  Location: [facility];  Service: Urology   • CO EXC THROMBOSED HEMORRHOID Patsi Spies N/A 2022    Procedure: EXCISION OF THROMBOSED EXTERNAL HEMORRHOID, Excision of perianal skin tag, dranage of perianal abscess, anal fistulatomy;  Surgeon: Flavio Page DO;  Location: [facility];  Service: General   • TUNNELED VENOUS PORT PLACEMENT N/A 2018    Procedure: INSERTION VENOUS PORT (PORT-A-CATH); Surgeon: Charlie Sánchez DO;  Location: [facility];  Service: General       Meds/Allergies:  Prior to Admission medications    Medication Sig Start Date End Date Taking?  Authorizing Provider   AMILoride 5 mg tablet Take 1 tablet (5 mg total) by mouth daily 19   Rubi Carr DO   amitriptyline (ELAVIL) 10 mg tablet Take 2 tablets (20 mg total) by mouth daily at bedtime 22   Handy Bhatia DO   B Complex Vitamins (VITAMIN B COMPLEX PO) Take 1 capsule by mouth daily      Historical Provider, MD [place] MISC by Does not apply " "route 2 (two) times a week Port care per [place] protocol 5/7/20   Madelin Mayer DO   cholestyramine sugar free (QUESTRAN LIGHT) 4 g packet Take 1 packet (4 g total) by mouth 2 (two) times a day for 3 days 5/26/23 5/29/23  Zainab Jiménez MD   cyanocobalamin 1,000 mcg/mL Inject 1 mL (1,000 mcg total) into a muscle every 30 (thirty) days Next dose due 5/1/2023 4/18/23   Jhonny Rudd MD   dicyclomine (BENTYL) 20 mg tablet Take 1 tablet (20 mg total) by mouth 4 (four) times a day as needed (cramping) for up to 3 days 5/26/23 5/29/23  Zainab Jiménez MD   ergocalciferol (ERGOCALCIFEROL) 1 25 MG (83828 UT) capsule Take 1 capsule (50,000 Units total) by mouth once a week Mondays and Thursdays 4/18/23   Jhonny Rudd MD   escitalopram (LEXAPRO) 20 mg tablet 20 mg daily at bedtime 3/14/23   Historical Provider, MD   ondansetron (ZOFRAN) 4 mg tablet Take 1 tablet (4 mg total) by mouth every 8 (eight) hours as needed for nausea or vomiting 3/6/22   Zainab Jiménez MD   potassium chloride 0 4 mEq/mL Inject 200 mL (80 mEq total) into a catheter in a vein over 8 hours at 25 mL/hr daily 80 meq in 1 litre bag to be infused daily over 8 hours   Continue as ordered prior to admission 5/26/23   Laurel Jiménez MD   Syringe/Needle, Disp, (SYRINGE 3CC/25GX5/8\") 25G X 5/8\" 3 ML MISC Use once monthly for B12 injection  6/1/20   Madelin Mayer DO   tamsulosin (FLOMAX) 0 4 mg TAKE ONE CAPSULE BY MOUTH IN THE MORNING 2/9/23   Kevin Ross DO     I have reviewed home medications with patient personally  Allergies: Allergies   Allergen Reactions   • Nsaids Other (See Comments)     Other reaction(s): Other (Please comment)  Should not take s/p bariatric surgery  Other reaction(s):  Other (See Comments)  Should not take as per prior records  Should not take s/p bariatric surgery  Has hx of gastric bypass     • Prednisone      Nausea, " "vomiting, diarrhea       Social History:  Marital Status: /Civil Union   Occupation: Unknown  Patient Pre-hospital Living Situation: Home  Patient Pre-hospital Level of Mobility: walks  Patient Pre-hospital Diet Restrictions: Regular diet  Substance Use History:   Social History     Substance and Sexual Activity   Alcohol Use Yes    Comment: rare     Social History     Tobacco Use   Smoking Status Never   Smokeless Tobacco Never     Social History     Substance and Sexual Activity   Drug Use Yes   • Types: Marijuana    Comment: Medical marijuana       Family History:  Family History   Problem Relation Age of Onset   • No Known Problems Mother    • Hypertension Father    • Diabetes Father    • Urolithiasis Father    • Prostate cancer Father    • Diabetes Maternal Grandfather        Physical Exam:     Vitals:   Blood Pressure: 127/81 (06/10/23 1744)  Pulse: 69 (06/10/23 1744)  Temperature: (!) 97 3 °F (36 3 °C) (06/10/23 1744)  Respirations: 18 (06/10/23 1744)  Height: 5' 2\" (157 5 cm) (06/10/23 1343)  Weight - Scale: 50 8 kg (111 lb 15 9 oz) (06/10/23 1723)  SpO2: 98 % (06/10/23 1744)    Physical Exam  Vitals and nursing note reviewed  Exam conducted with a chaperone present  Constitutional:       Appearance: Normal appearance  HENT:      Mouth/Throat:      Mouth: Mucous membranes are dry  Pharynx: No oropharyngeal exudate or posterior oropharyngeal erythema  Eyes:      General: No scleral icterus  Left eye: No discharge  Extraocular Movements: Extraocular movements intact  Conjunctiva/sclera: Conjunctivae normal       Pupils: Pupils are equal, round, and reactive to light  Cardiovascular:      Rate and Rhythm: Normal rate  Heart sounds: No murmur heard  No friction rub  No gallop  Pulmonary:      Effort: Pulmonary effort is normal  No respiratory distress  Breath sounds: No stridor  No wheezing or rhonchi  Chest:       Abdominal:      General: Abdomen is flat   " There is no distension  Palpations: Abdomen is soft  Tenderness: There is abdominal tenderness  There is right CVA tenderness, guarding and rebound  Hernia: No hernia is present  Comments: Diminished bowel sounds   Musculoskeletal:      Cervical back: Normal range of motion  Right lower leg: No edema  Left lower leg: No edema  Lymphadenopathy:      Cervical: No cervical adenopathy  Skin:     General: Skin is warm  Neurological:      General: No focal deficit present  Mental Status: She is alert and oriented to person, place, and time  Cranial Nerves: No cranial nerve deficit  Sensory: No sensory deficit  Motor: No weakness        Coordination: Coordination normal          Additional Data:     Lab Results:  Results from last 7 days   Lab Units 06/10/23  1419   EOS PCT % 0   HEMATOCRIT % 35 7   HEMOGLOBIN g/dL 10 9*   LYMPHS PCT % 20   MONOS PCT % 4   NEUTROS PCT % 75   PLATELETS Thousands/uL 408*   WBC Thousand/uL 9 14     Results from last 7 days   Lab Units 06/10/23  1419   ANION GAP mmol/L 9   ALBUMIN g/dL 4 0   ALK PHOS U/L 50   ALT U/L 13   AST U/L 17   BUN mg/dL 16   CALCIUM mg/dL 8 2*   CHLORIDE mmol/L 108   CO2 mmol/L 17*   CREATININE mg/dL 1 18   GLUCOSE RANDOM mg/dL 80   POTASSIUM mmol/L 3 2*   SODIUM mmol/L 134*   TOTAL BILIRUBIN mg/dL 0 35                 Results from last 7 days   Lab Units 06/10/23  1419   LACTIC ACID mmol/L 0 6   PROCALCITONIN ng/ml 133 20*       Lines/Drains:  Invasive Devices     Central Venous Catheter Line  Duration           Port A Cath Right Chest -- days          Peripheral Intravenous Line  Duration           Peripheral IV 06/10/23 Right;Ventral (anterior) Hand <1 day                Central Line:  Goal for removal: Mediport for potassium infusion           Imaging: Reviewed radiology reports from this admission including: abdominal/pelvic CT  CT abdomen pelvis with contrast   Final Result by Moises Martinez MD (06/10 1648)      Diffuse mucosal enhancement of the colon suggestive of colitis  Consider colonoscopy follow-up  Incidental small bowel intussusception, likely transient  The study was marked in [facility] for immediate notification  Workstation performed: [id number]             EKG and Other Studies Reviewed on Admission:   · EKG: No EKG obtained  ** Please Note: This note has been constructed using a voice recognition system   **

## 2023-06-10 NOTE — ASSESSMENT & PLAN NOTE
• CT A/P -Diffuse mucosal enhancement of the colon suggestive of colitis  Consider colonoscopy follow-up   Incidental small bowel intussusception, likely transient  • Supportive care, gradual advancement of diet  • Pain control prn  • Follow surgery consult

## 2023-06-11 RX ADMIN — SODIUM CHLORIDE, SODIUM LACTATE, POTASSIUM CHLORIDE, AND CALCIUM CHLORIDE 125 ML/HR: .6; .31; .03; .02 INJECTION, SOLUTION INTRAVENOUS at 02:23

## 2023-06-11 RX ADMIN — HYDROMORPHONE HYDROCHLORIDE 0.5 MG: 1 INJECTION, SOLUTION INTRAMUSCULAR; INTRAVENOUS; SUBCUTANEOUS at 02:22

## 2023-06-11 NOTE — ASSESSMENT & PLAN NOTE
Late evening, patient was evaluated by surgery, and early morning patient was transferred to [facility] for recurrent intussusception and with history of gastric bypass surgery

## 2023-06-11 NOTE — DISCHARGE SUMMARY
[address]  Discharge- Susan Bynum 1985, 43 y o  female MRN: [id number]  Unit/Bed#: -01 Encounter: [id number]  Primary Care Provider: Levi Nair MD   Date and time admitted to hospital: 6/10/2023  2:02 PM    Enterocolitis  Assessment & Plan  • Ct A/P -Diffuse mucosal enhancement of the colon suggestive of colitis  Consider colonoscopy follow-up  Incidental small bowel intussusception, likely transient  • Supportive care   • IVF hydration, pain control, IV antibiotic  • Anti-emetics  • Patient was recently admitted in the hospital, stool studies at that time negative  • Late evening, patient was evaluated by surgery, and early morning patient was transferred to [facility] for recurrent intussusception and with history of gastric bypass surgery  * Intussusception ([facility] [address] )  Assessment & Plan  • CT A/P -Diffuse mucosal enhancement of the colon suggestive of colitis  Consider colonoscopy follow-up  Incidental small bowel intussusception, likely transient  • Supportive care, gradual advancement of diet  • Pain control prn  Late evening, patient was evaluated by surgery, and early morning patient was transferred to [facility] for recurrent intussusception and with history of gastric bypass surgery  Idiopathic acute pancreatitis without infection or necrosis  Assessment & Plan  Late evening, patient was evaluated by surgery, and early morning patient was transferred to [facility] for recurrent intussusception and with history of gastric bypass surgery  History of gastric bypass  Assessment & Plan  Late evening, patient was evaluated by surgery, and early morning patient was transferred to [facility] for recurrent intussusception and with history of gastric bypass surgery      Hypokalemia  Assessment & Plan  Secondary to enterocolitis  Potassium 3 2  Check magnesium  Continue supplement          Medical Problems     Resolved Problems  Date Reviewed: 6/10/2023   None       Discharging Physician / Practitioner: Lizeth Funez MD  PCP: Dileep Munroe MD  Admission Date:   Admission Orders (From admission, onward)     Ordered        06/10/23 [address] [place] [address]  Once                      Discharge Date: 06/11/23    Consultations During Hospital Stay:  · General surgery    Procedures Performed:   CT abdomen pelvis wo contrast   Final Result by Rossi Fragoso MD (06/11 6037)      Patient is post gastric bypass  A small amount of oral contrast is seen in the excluded portion of the stomach  Short segment jejunojejunal intussusception in the left mid abdomen is again noted  Oral contrast is not yet seen in the colon    Recommend    serial abdominal x-rays to to ensure contrast reaches the colon/there is no small bowel obstruction  Liquid stool is seen within the rectum suggesting diarrhea  The study was marked in [facility] for immediate notification  Workstation performed: [id number]         CT abdomen pelvis with contrast   Final Result by Daniel Luz MD (06/10 1648)      Diffuse mucosal enhancement of the colon suggestive of colitis  Consider colonoscopy follow-up  Incidental small bowel intussusception, likely transient  The study was marked in [facility] for immediate notification              Workstation performed: [id number]         ·   ·     Significant Findings / Test Results:   Lab Results   Component Value Date    HCT 35 7 06/10/2023    HGB 10 9 (L) 06/10/2023    MCV 84 06/10/2023     (H) 06/10/2023    WBC 9 14 06/10/2023   ·   Lab Results   Component Value Date    AGAP 9 06/10/2023    ALKPHOS 50 06/10/2023    ALT 13 06/10/2023    AST 17 06/10/2023    BUN 16 06/10/2023    CALCIUM 8 2 (L) 06/10/2023     06/10/2023    CO2 17 (L) 06/10/2023    CREATININE 1 18 06/10/2023    EGFR 58 06/10/2023    GLUC 80 06/10/2023    GLUF 68 06/05/2023    K 3 2 (L) 06/10/2023    SODIUM 134 (L) 06/10/2023    TBILI 0 35 06/10/2023    TP 7 1 06/10/2023   ·   ·     Incidental Findings:   · As mentioned above  · I reviewed the above mentioned incidental findings with the patient and/or family and they expressed understanding  Test Results Pending at Discharge (will require follow up): · Blood culture     Outpatient Tests Requested:  · none    Complications: None    Reason for Admission: Abdominal pain, nausea vomiting    Hospital Course: Dwayne Calvin is a 43 y o  female patient who originally presented to the hospital on 6/10/2023 due to abdominal pain, nausea and vomiting  Patient was found to intussusception, enterocolitis  Patient had history of gastric bypass surgery  Patient is having recurrent intussusception  Evaluated by general surgery as per recommendation, patient get transferred to [facility] overnight  The patient, initially admitted to the hospital as inpatient, was discharged earlier than expected given the following: Patient got transferred to [facility] overnight       Please see above list of diagnoses and related plan for additional information  Condition at Discharge: stable    Discharge Day Visit / Exam:   * Please refer to separate progress note for these details *    Discussion with Family: with patient  Discharge instructions/Information to patient and family:   See after visit summary for information provided to patient and family  Provisions for Follow-Up Care:  See after visit summary for information related to follow-up care and any pertinent home health orders  Disposition:   Other: Patient got transferred to [facility]    Planned Readmission: none     Discharge Statement:  Patient was admitted by myself late evening  But early morning patient get consult to [facility]  Patient did and evaluated by this provider during transfer time      Discharge Medications:  See after visit summary for reconciled discharge medications provided to patient and/or family        **Please Note: This note may have been constructed using a voice recognition system**

## 2023-06-11 NOTE — EMTALA/ACUTE CARE TRANSFER
[address]  [address] [facility]  [place] [address] [address]  Dept: [phone]      [address] TRANSFER CONSENT    NAME Elian Kessler                                         1985                              MRN [id number]    I have been informed of my rights regarding examination, treatment, and transfer   by Dr Werner Guerrero MD    Benefits: Specialized equipment and/or services available at the receiving facility (Include comment)________________________Bariatric Surgery    Risks: Potential deterioration of medical condition, Loss of IV, Increased discomfort during transfer, Possible worsening of condition or death during transferdiscomfort, loss of IV, condition decompensating      Consent for Transfer:  I acknowledge that my medical condition has been evaluated and explained to me by the treating physician or other qualified medical person and/or my attending physician, who has recommended that I be transferred to the service of  Accepting Physician: Dr Paige Fuentes at [address] Name, [address] : [facility]  The above potential benefits of such transfer, the potential risks associated with such transfer, and the probable risks of not being transferred have been explained to me, and I fully understand them  The doctor has explained that, in my case, the benefits of transfer outweigh the risks  I agree to be transferred  I authorize the performance of emergency medical procedures and treatments upon me in both transit and upon arrival at the receiving facility  Additionally, I authorize the release of any and all medical records to the receiving facility and request they be transported with me, if possible  I understand that the safest mode of transportation during a medical emergency is an ambulance and that the Hospital advocates the use of this mode of transport   Risks of traveling to the receiving facility by car, including absence of medical control, life sustaining equipment, such as oxygen, and medical personnel has been explained to me and I fully understand them  (CRESCENCIO CORRECT BOX BELOW)  [  ]  I consent to the stated transfer and to be transported by ambulance/helicopter  [  ]  I consent to the stated transfer, but refuse transportation by ambulance and accept full responsibility for my transportation by car  I understand the risks of non-ambulance transfers and I exonerate the Hospital and its staff from any deterioration in my condition that results from this refusal     X___________________________________________    DATE  23  TIME________  Signature of patient or legally responsible individual signing on patient behalf           RELATIONSHIP TO PATIENT_________________________          Provider Certification    NAME Elian Kessler                                        [facility] 1985                              MRN [id number]    A medical screening exam was performed on the above named patient    Based on the examination:    Condition Necessitating Transfer intussusception    Patient Condition: The patient has been stabilized such that within reasonable medical probability, no material deterioration of the patient condition or the condition of the unborn child(rick) is likely to result from the transferIntussusception    Reason for Transfer: Level of Care needed not available at this facilityRequires bariatric surgery    Transfer Requirements: [address] With bariatric surgery  · Space available and qualified personnel available for treatment as acknowledged by Xander Bright  · Agreed to accept transfer and to provide appropriate medical treatment as acknowledged by       Dr Paige Fuentes  · Appropriate medical records of the examination and treatment of the patient are provided at the time of transfer   [address] _______  · Transfer will be performed by qualified personnel from    and appropriate transfer equipment as required, including the use of necessary and appropriate life support measures  Provider Certification: I have examined the patient and explained the following risks and benefits of being transferred/refusing transfer to the patient/family:  General risk, such as traffic hazards, adverse weather conditions, rough terrain or turbulence, possible failure of equipment (including vehicle or aircraft), or consequences of actions of persons outside the control of the transport personnel, Unanticipated needs of medical equipment and personnel during transport, Risk of worsening condition, The possibility of a transport vehicle being unavailable      Based on these reasonable risks and benefits to the patient and/or the unborn child(rick), and based upon the information available at the time of the patient’s examination, I certify that the medical benefits reasonably to be expected from the provision of appropriate medical treatments at another medical facility outweigh the increasing risks, if any, to the individual’s medical condition, and in the case of labor to the unborn child, from effecting the transfer      X____________________________________________ DATE 06/11/23        TIME_______      ORIGINAL - SEND TO MEDICAL RECORDS   COPY - SEND WITH PATIENT DURING TRANSFER

## 2023-06-11 NOTE — PLAN OF CARE
Problem: PAIN - ADULT  Goal: Verbalizes/displays adequate comfort level or baseline comfort level  Description: Interventions:  - Encourage patient to monitor pain and request assistance  - Assess pain using appropriate pain scale  - Administer analgesics based on type and severity of pain and evaluate response  - Implement non-pharmacological measures as appropriate and evaluate response  - Consider cultural and social influences on pain and pain management  - Notify physician/advanced practitioner if interventions unsuccessful or patient reports new pain  Outcome: Adequate for Discharge     Problem: INFECTION - ADULT  Goal: Absence or prevention of progression during hospitalization  Description: INTERVENTIONS:  - Assess and monitor for signs and symptoms of infection  - Monitor lab/diagnostic results  - Monitor all insertion sites, i e  indwelling lines, tubes, and drains  - Monitor endotracheal if appropriate and nasal secretions for changes in amount and color  - Pierce appropriate cooling/warming therapies per order  - Administer medications as ordered  - Instruct and encourage patient and family to use good hand hygiene technique  - Identify and instruct in appropriate isolation precautions for identified infection/condition  Outcome: Adequate for Discharge  Goal: Absence of fever/infection during neutropenic period  Description: INTERVENTIONS:  - Monitor WBC    Outcome: Adequate for Discharge     Problem: SAFETY ADULT  Goal: Patient will remain free of falls  Description: INTERVENTIONS:  - Educate patient/family on patient safety including physical limitations  - Instruct patient to call for assistance with activity   - Consult OT/PT to assist with strengthening/mobility   - Keep Call bell within reach  - Keep bed low and locked with side rails adjusted as appropriate  - Keep care items and personal belongings within reach  - Initiate and maintain comfort rounds  - Make Fall Risk Sign visible to staff  - Offer Toileting every  Hours, in advance of need  - Initiate/Maintain alarm  - Obtain necessary fall risk management equipment:   - Apply yellow socks and bracelet for high fall risk patients  - Consider moving patient to room near nurses station  6/27/6099 3610 by Isma Marcus RN  Outcome: Adequate for Discharge  2/21/0760 2930 by Isma Marcus RN  Outcome: Progressing  Goal: Maintain or return to baseline ADL function  Description: INTERVENTIONS:  -  Assess patient's ability to carry out ADLs; assess patient's baseline for ADL function and identify physical deficits which impact ability to perform ADLs (bathing, care of mouth/teeth, toileting, grooming, dressing, etc )  - Assess/evaluate cause of self-care deficits   - Assess range of motion  - Assess patient's mobility; develop plan if impaired  - Assess patient's need for assistive devices and provide as appropriate  - Encourage maximum independence but intervene and supervise when necessary  - Involve family in performance of ADLs  - Assess for home care needs following discharge   - Consider OT consult to assist with ADL evaluation and planning for discharge  - Provide patient education as appropriate  9/90/2680 8287 by Isma Marcus RN  Outcome: Adequate for Discharge  5/85/5117 7937 by Isma Marcus RN  Outcome: Progressing  Goal: Maintains/Returns to pre admission functional level  Description: INTERVENTIONS:  - Perform BMAT or MOVE assessment daily    - Set and communicate daily mobility goal to care team and patient/family/caregiver  - Collaborate with rehabilitation services on mobility goals if consulted  - Perform Range of Motion  times a day  - Reposition patient every  hours    - Dangle patient  times a day  - Stand patient  times a day  - Ambulate patient  times a day  - Out of bed to chair  times a day   - Out of bed for meals times a day  - Out of bed for toileting  - Record patient progress and toleration of activity level   6/11/2023 8160 by Ashley Hutchsion RN  Outcome: Adequate for Discharge  6/56/8319 8343 by Ashley Hutchison RN  Outcome: Progressing     Problem: DISCHARGE PLANNING  Goal: Discharge to home or other facility with appropriate resources  Description: INTERVENTIONS:  - Identify barriers to discharge w/patient and caregiver  - Arrange for needed discharge resources and transportation as appropriate  - Identify discharge learning needs (meds, wound care, etc )  - Arrange for interpretive services to assist at discharge as needed  - Refer to Case Management Department for coordinating discharge planning if the patient needs post-hospital services based on physician/advanced practitioner order or complex needs related to functional status, cognitive ability, or social support system  Outcome: Adequate for Discharge     Problem: Knowledge Deficit  Goal: Patient/family/caregiver demonstrates understanding of disease process, treatment plan, medications, and discharge instructions  Description: Complete learning assessment and assess knowledge base    Interventions:  - Provide teaching at level of understanding  - Provide teaching via preferred learning methods  Outcome: Adequate for Discharge     Problem: GASTROINTESTINAL - ADULT  Goal: Minimal or absence of nausea and/or vomiting  Description: INTERVENTIONS:  - Administer IV fluids if ordered to ensure adequate hydration  - Maintain NPO status until nausea and vomiting are resolved  - Nasogastric tube if ordered  - Administer ordered antiemetic medications as needed  - Provide nonpharmacologic comfort measures as appropriate  - Advance diet as tolerated, if ordered  - Consider nutrition services referral to assist patient with adequate nutrition and appropriate food choices  Outcome: Adequate for Discharge  Goal: Maintains or returns to baseline bowel function  Description: INTERVENTIONS:  - Assess bowel function  - Encourage oral fluids to ensure adequate hydration  - Administer IV fluids if ordered to ensure adequate hydration  - Administer ordered medications as needed  - Encourage mobilization and activity  - Consider nutritional services referral to assist patient with adequate nutrition and appropriate food choices  Outcome: Adequate for Discharge  Goal: Maintains adequate nutritional intake  Description: INTERVENTIONS:  - Monitor percentage of each meal consumed  - Identify factors contributing to decreased intake, treat as appropriate  - Assist with meals as needed  - Monitor I&O, weight, and lab values if indicated  - Obtain nutrition services referral as needed  Outcome: Adequate for Discharge  Goal: Oral mucous membranes remain intact  Description: INTERVENTIONS  - Assess oral mucosa and hygiene practices  - Implement preventative oral hygiene regimen  - Implement oral medicated treatments as ordered  - Initiate Nutrition services referral as needed  Outcome: Adequate for Discharge     Problem: METABOLIC, FLUID AND ELECTROLYTES - ADULT  Goal: Electrolytes maintained within normal limits  Description: INTERVENTIONS:  - Monitor labs and assess patient for signs and symptoms of electrolyte imbalances  - Administer electrolyte replacement as ordered  - Monitor response to electrolyte replacements, including repeat lab results as appropriate  - Instruct patient on fluid and nutrition as appropriate  Outcome: Adequate for Discharge  Goal: Fluid balance maintained  Description: INTERVENTIONS:  - Monitor labs   - Monitor I/O and WT  - Instruct patient on fluid and nutrition as appropriate  - Assess for signs & symptoms of volume excess or deficit  Outcome: Adequate for Discharge  Goal: Glucose maintained within target range  Description: INTERVENTIONS:  - Monitor Blood Glucose as ordered  - Assess for signs and symptoms of hyperglycemia and hypoglycemia  - Administer ordered medications to maintain glucose within target range  - Assess nutritional intake and initiate nutrition service referral as needed  Outcome: Adequate for Discharge

## 2023-06-11 NOTE — NURSING NOTE
Pt transferred via med vac to [facility]  Report given  All belongings accounted for and taken with pt  Image disk sent with patient   IV remains in place to (l) hand and port remains accessed to (r) chest

## 2023-06-11 NOTE — UTILIZATION REVIEW
Initial Clinical Review    Admission: Date/Time/Statement:   Admission Orders (From admission, onward)     Ordered        06/10/23 1711  INPATIENT ADMISSION  Once                      Orders Placed This Encounter   Procedures   • INPATIENT ADMISSION     Standing Status:   Standing     Number of Occurrences:   1     Order Specific Question:   Level of Care     Answer:   Med Surg [16]     Order Specific Question:   Estimated length of stay     Answer:   More than 2 Midnights     Order Specific Question:   Certification     Answer:   I certify that inpatient services are medically necessary for this patient for a duration of greater than two midnights  See H&P and MD Progress Notes for additional information about the patient's course of treatment  ED Arrival Information     Expected   -    Arrival   6/10/2023 13:20    Acuity   Urgent            Means of arrival   Walk-In    Escorted by   Self    Service   Hospitalist    Admission type   Emergency            Arrival complaint   Vomiting/Abdominal Cramping            Chief Complaint   Patient presents with   • Abdominal Pain     Pt arrives reporting right flank pain with nausea and vomiting and diarrhea since yesterday  Pt denies trouble with urination  Pt has port for chronic low potassium levels  Initial Presentation: 43 y o  female presents to ED from home with RUQ/flank pain for past day  Started  Initially with nausea and diarrhea  Pain constant/stabbing, 9/10, worse with food  PMH is chronic pancreatitis, chronic hypokalemia and gastric bypass surgery  Ct abdomen shows colitis/incidental SB intussception, likely transient  Labs show  K  3 2, low sodium  Admit  Ip with  Intussusception, Hyponatremia, Acute pancreatitis and hypokalemia and plan is  Monitor labs, pain control, IVF, replace electrolytes and surgery consult  Surgery consult  History provided inconsistent with medical records     Contact  Rory bariatric  Surgeon, given history, On  How to proceed  6/11  Transferred to [facility]  ED Triage Vitals [06/10/23 1343]   Temperature Pulse Respirations Blood Pressure SpO2   97 5 °F (36 4 °C) 90 18 120/76 100 %      Temp src Heart Rate Source Patient Position - Orthostatic VS BP Location FiO2 (%)   -- -- -- -- --      Pain Score       8          Wt Readings from Last 1 Encounters:   06/10/23 50 8 kg (111 lb 15 9 oz)     Additional Vital Signs:   97 7 °F (36 5 °C) 74 18 105/68 80 97 % --    06/10/23 1952 -- -- -- -- -- -- None (Room air)   06/10/23 17:44:34 97 3 °F (36 3 °C) Abnormal  69 18 127/81 96 98 % --   06/10/23 1343 97 5 °F (36 4 °C) 90 18 120/76 93 100 %        Pertinent Labs/Diagnostic Test Results:   CT abdomen pelvis wo contrast   Final Result by Anne Crockett MD (06/11 6532)      Patient is post gastric bypass  A small amount of oral contrast is seen in the excluded portion of the stomach  Short segment jejunojejunal intussusception in the left mid abdomen is again noted  Oral contrast is not yet seen in the colon    Recommend    serial abdominal x-rays to to ensure contrast reaches the colon/there is no small bowel obstruction  Liquid stool is seen within the rectum suggesting diarrhea  The study was marked in [facility] for immediate notification  Workstation performed: [id number]         CT abdomen pelvis with contrast   Final Result by Tamika Ying MD (06/10 1818)      Diffuse mucosal enhancement of the colon suggestive of colitis  Consider colonoscopy follow-up  Incidental small bowel intussusception, likely transient  The study was marked in [facility] for immediate notification              Workstation performed: [id number]               Results from last 7 days   Lab Units 06/10/23  1419   HEMATOCRIT % 35 7   HEMOGLOBIN g/dL 10 9*   NEUTROS ABS Thousands/µL 6 74   PLATELETS Thousands/uL 408*   WBC Thousand/uL 9 14         Results from last 7 days   Lab Units 06/10/23  1419 06/05/23  1311   ANION GAP mmol/L 9 6   BUN mg/dL 16 10   CALCIUM mg/dL 8 2* 8 7   CHLORIDE mmol/L 108 108   CO2 mmol/L 17* 21   CREATININE mg/dL 1 18 1 01   EGFR ml/min/1 73sq m 58 70   POTASSIUM mmol/L 3 2* 3 6   MAGNESIUM mg/dL 2 1  --    SODIUM mmol/L 134* 135     Results from last 7 days   Lab Units 06/10/23  1419   ALBUMIN g/dL 4 0   ALK PHOS U/L 50   ALT U/L 13   AST U/L 17   TOTAL BILIRUBIN mg/dL 0 35   TOTAL PROTEIN g/dL 7 1         Results from last 7 days   Lab Units 06/10/23  1419   GLUCOSE RANDOM mg/dL 80                   Results from last 7 days   Lab Units 06/10/23  1419   PROCALCITONIN ng/ml 133 20*     Results from last 7 days   Lab Units 06/10/23  1419   LACTIC ACID mmol/L 0 6                                 Results from last 7 days   Lab Units 06/10/23  1419   LIPASE u/L 130*                 Results from last 7 days   Lab Units 06/10/23  1452   BACTERIA UA /hpf None Seen   BILIRUBIN UA  Negative   BLOOD UA  Negative   CLARITY UA  Clear   COLOR UA  Yellow   EPITHELIAL CELLS WET PREP /hpf Occasional   GLUCOSE UA mg/dl Negative   KETONES UA mg/dl Negative   LEUKOCYTES UA  Negative   NITRITE UA  Negative   PH UA  5 5   PROTEIN UA mg/dl 100 (2+)*   RBC UA /hpf None Seen   SPEC GRAV UA  >=1 030   UROBILINOGEN UA E U /dl 0 2   WBC UA /hpf None Seen             Results from last 7 days   Lab Units 06/10/23  1452   AMPH/METH  Negative   BARBITURATE UR  Negative   BENZODIAZEPINE UR  Negative   COCAINE UR  Negative   METHADONE URINE  Negative   OPIATE UR  Negative   PCP UR  Negative   THC UR  Positive*                     Results from last 7 days   Lab Units 06/10/23  1731   BLOOD CULTURE  Received in Microbiology Lab  Culture in Progress  Received in Microbiology Lab  Culture in Progress                     ED Treatment:   Medication Administration from 06/10/2023 1320 to 06/10/2023 1741       Date/Time Order Dose Route Action Comments     06/10/2023 1520 EDT sodium chloride 0 9 % bolus 1,000 mL 0 mL Intravenous Stopped --     06/10/2023 1420 EDT sodium chloride 0 9 % bolus 1,000 mL 1,000 mL Intravenous New Bag --     06/10/2023 1421 EDT ondansetron (ZOFRAN) injection 4 mg 4 mg Intravenous Given --     06/10/2023 1422 EDT morphine injection 4 mg 4 mg Intravenous Given --     06/10/2023 1551 EDT morphine injection 4 mg 4 mg Intravenous Given --     06/10/2023 1534 EDT iohexol (OMNIPAQUE) 350 MG/ML injection (SINGLE-DOSE) 100 mL 100 mL Intravenous Given --     06/10/2023 1731 EDT ondansetron (ZOFRAN) injection 4 mg 4 mg Intravenous Given --        Present on Admission:  • Hypokalemia  • Enterocolitis  • Intussusception (HCC)  • Hyponatremia  • Idiopathic acute pancreatitis without infection or necrosis  • Intractable vomiting  • Left upper quadrant abdominal pain  • Acute on chronic abdominal pain      Admitting Diagnosis: Intussusception ([address] ) [K56 1]  Enterocolitis [K52 9]  Hypokalemia [E87 6]  Colitis [K52 9]  Abdominal pain [R10 9]  RUQ pain [R10 11]  Nausea vomiting and diarrhea [R11 2, R19 7]  Age/Sex: 43 y o  female  Admission Orders:  Scheduled Medications:  IV  cipro Q 12 hrs  IV flagyl Q 8 hrs  IV  KCl X 2  SQ lovenox daily        Continuous IV Infusions:  IVF  125/hr          PRN Meds:  IV  Dilaudid  0 5  Mg  ( x2  6/10 and X 1  6/11)        IP CONSULT TO ACUTE CARE SURGERY    Network Utilization Review Department  ATTENTION: Please call with any questions or concerns to [phone] and carefully listen to the prompts so that you are directed to the right person  All voicemails are confidential   Ivy Dates all requests for admission clinical reviews, approved or denied determinations and any other requests to dedicated fax number below belonging to the campus where the patient is receiving treatment   List of dedicated fax numbers for the Facilities:  [address] DENIALS (Administrative/Medical Necessity) [phone]   [address] (Maternity/NICU/Pediatrics) [phone] [address] [phone]   [address] [place] [address] [address] 310 [facility] 134 871 [facility] [phone]

## 2023-06-11 NOTE — PLAN OF CARE
Problem: SAFETY ADULT  Goal: Patient will remain free of falls  Description: INTERVENTIONS:  - Educate patient/family on patient safety including physical limitations  - Instruct patient to call for assistance with activity   - Consult OT/PT to assist with strengthening/mobility   - Keep Call bell within reach  - Keep bed low and locked with side rails adjusted as appropriate  - Keep care items and personal belongings within reach  - Initiate and maintain comfort rounds  - Make Fall Risk Sign visible to staff  - Offer Toileting every  Hours, in advance of need  - Initiate/Maintain alarm  - Obtain necessary fall risk management equipment:   - Apply yellow socks and bracelet for high fall risk patients  - Consider moving patient to room near nurses station  Outcome: Progressing  Goal: Maintain or return to baseline ADL function  Description: INTERVENTIONS:  -  Assess patient's ability to carry out ADLs; assess patient's baseline for ADL function and identify physical deficits which impact ability to perform ADLs (bathing, care of mouth/teeth, toileting, grooming, dressing, etc )  - Assess/evaluate cause of self-care deficits   - Assess range of motion  - Assess patient's mobility; develop plan if impaired  - Assess patient's need for assistive devices and provide as appropriate  - Encourage maximum independence but intervene and supervise when necessary  - Involve family in performance of ADLs  - Assess for home care needs following discharge   - Consider OT consult to assist with ADL evaluation and planning for discharge  - Provide patient education as appropriate  Outcome: Progressing  Goal: Maintains/Returns to pre admission functional level  Description: INTERVENTIONS:  - Perform BMAT or MOVE assessment daily    - Set and communicate daily mobility goal to care team and patient/family/caregiver     - Collaborate with rehabilitation services on mobility goals if consulted  - Perform Range of Motion  times a day   - Reposition patient every  hours    - Dangle patient  times a day  - Stand patient  times a day  - Ambulate patient  times a day  - Out of bed to chair  times a day   - Out of bed for meals  times a day  - Out of bed for toileting  - Record patient progress and toleration of activity level   Outcome: Progressing Billing Type: Third-Party Bill

## 2023-06-11 NOTE — EMTALA/ACUTE CARE TRANSFER
[address] [facility] [address]IT  [address] [facility] [address]  Dept: [phone]      ACUTE CARE TRANSFER CONSENT    NAME Jazmyne Bowles                                         1985                              MRN [id number]    I have been informed of my rights regarding examination, treatment, and transfer   by Dr Luis Manuel Gallagher MD    Benefits:  Bariatric Surgery    Risks:  discomfort, loss of IV, condition decompensating      Consent for Transfer:  I acknowledge that my medical condition has been evaluated and explained to me by the treating physician or other qualified medical person and/or my attending physician, who has recommended that I be transferred to the service of  Accepting Physician: Dr Velasquez Leiva at [address] Name, [address] : [facility]  The above potential benefits of such transfer, the potential risks associated with such transfer, and the probable risks of not being transferred have been explained to me, and I fully understand them  The doctor has explained that, in my case, the benefits of transfer outweigh the risks  I agree to be transferred  I authorize the performance of emergency medical procedures and treatments upon me in both transit and upon arrival at the receiving facility  Additionally, I authorize the release of any and all medical records to the receiving facility and request they be transported with me, if possible  I understand that the safest mode of transportation during a medical emergency is an ambulance and that the Hospital advocates the use of this mode of transport  Risks of traveling to the receiving facility by car, including absence of medical control, life sustaining equipment, such as oxygen, and medical personnel has been explained to me and I fully understand them  (CRESCENCIO CORRECT BOX BELOW)  [  ]  I consent to the stated transfer and to be transported by ambulance/helicopter    [  ]  I consent to the stated transfer, but refuse transportation by ambulance and accept full responsibility for my transportation by car  I understand the risks of non-ambulance transfers and I exonerate the Hospital and its staff from any deterioration in my condition that results from this refusal     X___________________________________________    DATE  23  TIME________  Signature of patient or legally responsible individual signing on patient behalf           RELATIONSHIP TO PATIENT_________________________          Provider Certification    NAME Enrique Jerez                                         1985                              MRN [id number]    A medical screening exam was performed on the above named patient  Based on the examination:    Condition Necessitating Transfer intussusception    Patient Condition:  Intussusception    Reason for Transfer:  Requires bariatric surgery    Transfer Requirements: Facility   With bariatric surgery  · Space available and qualified personnel available for treatment as acknowledged by    · Agreed to accept transfer and to provide appropriate medical treatment as acknowledged by          · Appropriate medical records of the examination and treatment of the patient are provided at the time of transfer   [address] [facility], [address] _______  · Transfer will be performed by qualified personnel from    and appropriate transfer equipment as required, including the use of necessary and appropriate life support measures      Provider Certification: I have examined the patient and explained the following risks and benefits of being transferred/refusing transfer to the patient/family:         Based on these reasonable risks and benefits to the patient and/or the unborn child(rick), and based upon the information available at the time of the patient’s examination, I certify that the medical benefits reasonably to be expected from the provision of appropriate medical treatments at another [facility] facility outweigh the increasing risks, if any, to the individual’s medical condition, and in the case of labor to the unborn child, from effecting the transfer      X____________________________________________ DATE 06/11/23        TIME_______      ORIGINAL - SEND TO MEDICAL RECORDS   COPY - SEND WITH PATIENT DURING TRANSFER

## 2023-06-11 NOTE — ASSESSMENT & PLAN NOTE
• Ct A/P -Diffuse mucosal enhancement of the colon suggestive of colitis  Consider colonoscopy follow-up  Incidental small bowel intussusception, likely transient  • Supportive care   • IVF hydration, pain control, IV antibiotic  • Anti-emetics  • Patient was recently admitted in the hospital, stool studies at that time negative  • Late evening, patient was evaluated by surgery, and early morning patient was transferred to [facility] for recurrent intussusception and with history of gastric bypass surgery

## 2023-06-11 NOTE — ASSESSMENT & PLAN NOTE
Late evening, patient was evaluated by surgery, and early morning patient was transferred to [facility] for recurrent intussusception and with history of gastric bypass surgery

## 2023-06-11 NOTE — ASSESSMENT & PLAN NOTE
• CT A/P -Diffuse mucosal enhancement of the colon suggestive of colitis  Consider colonoscopy follow-up  Incidental small bowel intussusception, likely transient  • Supportive care, gradual advancement of diet  • Pain control prn  Late evening, patient was evaluated by surgery, and early morning patient was transferred to [facility] for recurrent intussusception and with history of gastric bypass surgery

## 2023-06-12 NOTE — UTILIZATION REVIEW
NOTIFICATION OF ADMISSION DISCHARGE   This is a Notification of Discharge from [address]  Please be advised that this patient has been discharge from our facility  Below you will find the admission and discharge date and time including the patient’s disposition  UTILIZATION REVIEW CONTACT:  [address] [place]  Utilization   Network Utilization Review Department  Phone: [phone] x carefully listen to the prompts  All voicemails are confidential   Email: [email or web address]     ADMISSION INFORMATION  PRESENTATION DATE: 6/10/2023  2:02 PM  OBERVATION ADMISSION DATE:   INPATIENT ADMISSION DATE: 6/10/23  5:11 PM   DISCHARGE DATE: 6/11/2023  3:14 AM   DISPOSITION:Non [facility]    IMPORTANT INFORMATION:  Send all requests for admission clinical reviews, approved or denied determinations and any other requests to dedicated fax number below belonging to the campus where the patient is receiving treatment   List of dedicated fax numbers:  [address] DENIALS (Administrative/Medical Necessity) [phone]   [address] (Maternity/NICU/Pediatrics) [phone]   [facility] [phone]   [place] [address] [phone]   [facility] [place]  [phone]   [address] [facility] [phone]   [address] [place] [address] [phone]   [address] [place] [address] [phone]   [facility]  [phone] 405[address] [facility] [phone]   [address] [facility] [address] [facility] [phone]

## 2023-06-15 LAB
BACTERIA BLD CULT: NORMAL
BACTERIA BLD CULT: NORMAL

## 2023-06-16 ENCOUNTER — TELEMEDICINE (OUTPATIENT)
Dept: NEPHROLOGY | Facility: CLINIC | Age: 38
End: 2023-06-16
Payer: COMMERCIAL

## 2023-06-16 VITALS — DIASTOLIC BLOOD PRESSURE: 64 MMHG | SYSTOLIC BLOOD PRESSURE: 126 MMHG

## 2023-06-16 DIAGNOSIS — E87.1 HYPONATREMIA: ICD-10-CM

## 2023-06-16 DIAGNOSIS — N20.0 NEPHROLITHIASIS: Primary | ICD-10-CM

## 2023-06-16 DIAGNOSIS — I10 ESSENTIAL HYPERTENSION: ICD-10-CM

## 2023-06-16 PROCEDURE — 99213 OFFICE O/P EST LOW 20 MIN: CPT | Performed by: PHYSICIAN ASSISTANT

## 2023-06-16 NOTE — PROGRESS NOTES
Assessment & Plan:    {There are no diagnoses linked to this encounter  (Refresh or delete this SmartLink)}     The benefits, risks and alternatives to the treatment plan were discussed at this visit  Patient was advised of common adverse effects of any medical therapies prescribed  All questions were answered and discussed with the patient and any accompanying family members or caretakers  Subjective:      Patient ID: Carlos Haq is a 43 y o  female seen in the [place] office  Patient is followed by Dr Alana Cha, most recently was seen in consultation during hospitalization on 11/29/2022 for hypokalemia, at which time she was aggressively repleted  HPI     Today, patient presents for follow-up of hospitalization  Patient was hospitalized at [place] [address] from 6/11/2023 through 6/11/2023 for enterocolitis with intussusception  This was a recurrent issue and she has a history of gastric bypass surgery  Nephrology is now consulted during this hospitalization, but she did experience hypokalemia secondary to enterocolitis  Blood pressure is  Patient does not monitor blood pressures at home  Denies headaches, lightheadedness, dizziness  Patient is not on antihypertensives  Patient denies lower extremity swelling  Reviewed and discussed the results of labs performed 6/15/2023 which reveals that renal function is excellent with a creatinine of 0 9 mg/dL estimated GFR 85 mL/min  Potassium 3 7 mmol/L  History is obtained from patient      The following portions of the patient's history were reviewed and updated as appropriate: allergies, current medications, past family history, past medical history, past social history, past surgical history, and problem list     Review of Systems      Objective:      LMP  (LMP Unknown)          Physical Exam        Lab Results   Component Value Date    SODIUM 134 (L) 06/10/2023    K 3 2 (L) 06/10/2023     06/10/2023    CO2 17 "(L) 06/10/2023    AGAP 9 06/10/2023    BUN 16 06/10/2023    CREATININE 1 18 06/10/2023    GLUC 80 06/10/2023    GLUF 68 06/05/2023    CALCIUM 8 2 (L) 06/10/2023    AST 17 06/10/2023    ALT 13 06/10/2023    ALKPHOS 50 06/10/2023    TP 7 1 06/10/2023    TBILI 0 35 06/10/2023    EGFR 58 06/10/2023      Lab Results   Component Value Date    CREATININE 1 18 06/10/2023    CREATININE 1 01 06/05/2023    CREATININE 0 95 05/26/2023    CREATININE 0 95 05/25/2023    CREATININE 0 91 05/24/2023    CREATININE 1 02 05/23/2023    CREATININE 1 02 05/23/2023    CREATININE 1 08 05/22/2023    CREATININE 1 01 05/15/2023    CREATININE 0 84 05/01/2023    CREATININE 0 92 04/24/2023    CREATININE 0 93 04/18/2023    CREATININE 0 97 04/17/2023    CREATININE 1 04 04/17/2023    CREATININE 1 04 04/16/2023      Lab Results   Component Value Date    COLORU Yellow 06/10/2023    CLARITYU Clear 06/10/2023    SPECGRAV >=1 030 06/10/2023    PHUR 5 5 06/10/2023    LEUKOCYTESUR Negative 06/10/2023    NITRITE Negative 06/10/2023    PROTEIN  (2+) (A) 06/10/2023    GLUCOSEU Negative 06/10/2023    KETONESU Negative 06/10/2023    UROBILINOGEN 0 2 06/10/2023    BILIRUBINUR Negative 06/10/2023    BLOODU Negative 06/10/2023    RBCUA None Seen 06/10/2023    WBCUA None Seen 06/10/2023    EPIS Occasional 06/10/2023    BACTERIA None Seen 06/10/2023      No results found for: \"LABPROT\"  No results found for: \"MICROALBUR\", \"AOIX07EXO\"  Lab Results   Component Value Date    WBC 9 14 06/10/2023    HGB 10 9 (L) 06/10/2023    HCT 35 7 06/10/2023    MCV 84 06/10/2023     (H) 06/10/2023      Lab Results   Component Value Date    HGB 10 9 (L) 06/10/2023    HGB 9 5 (L) 05/24/2023    HGB 10 6 (L) 05/23/2023    HGB 9 6 (L) 04/17/2023    HGB 12 5 04/16/2023      Lab Results   Component Value Date    IRON 116 09/02/2022    TIBC 235 (L) 09/02/2022    FERRITIN 26 09/02/2022      No results found for: \"PTHCALCIUM\", \"FJDD08ZWHBEX\", \"PHOSPHORUS\"   Lab Results   Component " "Value Date    CHOLESTEROL 161 02/19/2021    HDL 70 02/19/2021    LDLCALC 70 02/19/2021    TRIG 72 09/01/2022      No results found for: \"URICACID\"   Lab Results   Component Value Date    HGBA1C 5 2 02/19/2021      Lab Results   Component Value Date    V3EYLKS 1 00 07/14/2018    FREET4 0 74 (L) 07/14/2018      Lab Results   Component Value Date    ANA LILIA Negative 09/02/2022      Lab Results   Component Value Date    UPEP  07/12/2018     The urine total protein and electrophoresis are within normal limits  No monoclonal bands noted  Reviewed by: Melia Estevez MD ([id number])  **Electronic Signature**        Portions of the record may have been created with voice recognition software  Occasional wrong word or \"sound a like\" substitutions may have occurred due to the inherent limitations of voice recognition software  Read the chart carefully and recognize, using context, where substitutions have occurred  If you have any questions, please contact the dictating provider  Seen in the [place] office    "

## 2023-06-16 NOTE — PROGRESS NOTES
Virtual Regular Visit    Verification of patient location:    Patient is located at Other in the following state in which I hold an active license [place]      Assessment/Plan:    Problem List Items Addressed This Visit        Cardiovascular and Mediastinum    Essential hypertension     Well-controlled off antihypertensives            Genitourinary    Nephrolithiasis - Primary    Relevant Orders    Stone risk profile       Other    Hyponatremia     Continue to monitor                 Reason for visit is   Chief Complaint   Patient presents with   • Follow-up   • Virtual Regular Visit        Encounter provider Bill Faria PA-C    Provider located at [place] [address]  [address]  [facility] [address] [phone]      Recent Visits  No visits were found meeting these conditions  Showing recent visits within past 7 days and meeting all other requirements  Today's Visits  Date Type Provider Dept   06/16/23 Telemedicine Bill Faria, [facility] today's visits and meeting all other requirements  Future Appointments  No visits were found meeting these conditions  Showing future appointments within next 150 days and meeting all other requirements       The patient was identified by name and date of birth  Konrad Hyatt was informed that this is a telemedicine visit and that the visit is being conducted through the [employer]  She agrees to proceed  My office door was closed  No one else was in the room  She acknowledged consent and understanding of privacy and security of the video platform  The patient has agreed to participate and understands they can discontinue the visit at any time  Patient is aware this is a billable service  Flory David is a 43 y o  female seen via Virtual Visit         HPI        Patient ID: Konrad Hyatt is a 43 y o  female seen in the [facility] office  Patient is followed by Dr Gilbert Luther, most recently was seen in consultation during hospitalization on 11/29/2022 for hypokalemia, at which time she was aggressively repleted  HPI     Today, patient presents for follow-up of hospitalization  Patient was hospitalized at [address] [place] [address] from 6/11/2023 through 6/11/2023 for enterocolitis with intussusception  This was a recurrent issue and she has a history of gastric bypass surgery  Nephrology is now consulted during this hospitalization, but she did experience hypokalemia secondary to enterocolitis  Presents because she has had four or five kidney stones in the past 6 months, about one per month  Was seen by Urology  She drinks water well but has loose bowels  She is actually located in a hospital right now following a laparoscopic revision jejunostomy  She denies N/V  On clear liquids  Blood pressure is 126/64  Patient does not monitor blood pressures at home  Report occasional lightheadedness, dizziness  Patient is not on antihypertensives  Patient denies lower extremity swelling  Reviewed and discussed the results of labs performed 6/15/2023 which reveals that renal function is excellent with a creatinine of 0 9 mg/dL estimated GFR 85 mL/min  Potassium 3 7 mmol/L  Low vit D and low calcium, on supplementation  History is obtained from patient      The following portions of the patient's history were reviewed and updated as appropriate: allergies, current medications, past family history, past medical history, past social history, past surgical history, and problem list       Past Medical History:   Diagnosis Date   • C  difficile colitis 2016   • COVID-19     1/2022- pt denies long covid   • DUB (dysfunctional uterine bleeding)    • H  pylori infection    • Hypertension    • Hypokalemia    • Kidney stone November 2023   • Left lower quadrant abdominal pain 08/17/2020   • "Migraine    • Pancreatitis    • Psychiatric disorder     Anxiety   • Rectal bleeding    • Renal disorder    • Rhabdomyolysis    • Thrombosed external hemorrhoid        Past Surgical History:   Procedure Laterality Date   • ABDOMINAL SURGERY     • APPENDECTOMY     •  SECTION     • CHOLECYSTECTOMY     • COSMETIC SURGERY      \"tummy tuck\"   • FL GUIDED CENTRAL VENOUS ACCESS DEVICE INSERTION  2018   • FL RETROGRADE PYELOGRAM  1/3/2023   • GASTRIC BYPASS     • HYSTERECTOMY     • LAPAROSCOPY     • RI ANRCT XM SURG REQ ANES GENERAL SPI/EDRL DX N/A 2021    Procedure: ANAL EXAM UNDER ANESTHESIA; HEMORRHOIDECTOMY;  Surgeon: Asha Rangel DO;  Location: [facility];  Service: General   • RI CYSTO/URETERO W/LITHOTRIPSY &INDWELL STENT INSRT Left 1/3/2023    Procedure: CYSTOSCOPY URETEROSCOPY WITH RETROGRADE PYELOGRAM, BASKET STONE EXTRACTION AND INSERTION STENT URETERAL;  Surgeon: Sandy Desir MD;  Location: [facility];  Service: Urology   • RI EXC THROMBOSED HEMORRHOID [place] Persons N/A 2022    Procedure: EXCISION OF THROMBOSED EXTERNAL HEMORRHOID, Excision of perianal skin tag, dranage of perianal abscess, anal fistulatomy;  Surgeon: Asha Rangel DO;  Location: [facility];  Service: General   • TUNNELED VENOUS PORT PLACEMENT N/A 2018    Procedure: INSERTION VENOUS PORT (PORT-A-CATH);   Surgeon: Elisabeth Garber DO;  Location: [facility];  Service: General       Current Outpatient Medications   Medication Sig Dispense Refill   • AMILoride 5 mg tablet Take 1 tablet (5 mg total) by mouth daily 30 tablet 0   • amitriptyline (ELAVIL) 10 mg tablet Take 2 tablets (20 mg total) by mouth daily at bedtime 60 tablet 0   • cyanocobalamin 1,000 mcg/mL Inject 1 mL (1,000 mcg total) into a muscle every 30 (thirty) days Next dose due 2023     • ergocalciferol (ERGOCALCIFEROL) 1 25 MG (80357 UT) capsule Take 1 capsule (50,000 Units total) by mouth once a week  and      • " "escitalopram (LEXAPRO) 20 mg tablet 20 mg daily at bedtime     • ondansetron (ZOFRAN) 4 mg tablet Take 1 tablet (4 mg total) by mouth every 8 (eight) hours as needed for nausea or vomiting 20 tablet 0   • potassium chloride 0 4 mEq/mL Inject 200 mL (80 mEq total) into a catheter in a vein over 8 hours at 25 mL/hr daily 80 meq in 1 litre bag to be infused daily over 8 hours   Continue as ordered prior to admission 2800 mL 0   • Syringe/Needle, Disp, (SYRINGE 3CC/25GX5/8\") 25G X 5/8\" 3 ML MISC Use once monthly for B12 injection  1 each 11   • B Complex Vitamins (VITAMIN B COMPLEX PO) Take 1 capsule by mouth daily       • Catheters MISC by Does not apply route 2 (two) times a week Port care per [place] protocol 999 each 11   • cholestyramine sugar free (QUESTRAN LIGHT) 4 g packet Take 1 packet (4 g total) by mouth 2 (two) times a day for 3 days 6 packet 0   • dicyclomine (BENTYL) 20 mg tablet Take 1 tablet (20 mg total) by mouth 4 (four) times a day as needed (cramping) for up to 3 days 6 tablet 0   • tamsulosin (FLOMAX) 0 4 mg TAKE ONE CAPSULE BY MOUTH IN THE MORNING 30 capsule 0     No current facility-administered medications for this visit  Allergies   Allergen Reactions   • Nsaids Other (See Comments)     Other reaction(s): Other (Please comment)  Should not take s/p bariatric surgery  Other reaction(s):  Other (See Comments)  Should not take as per prior records  Should not take s/p bariatric surgery  Has hx of gastric bypass     • Prednisone      Nausea, vomiting, diarrhea       Review of Systems    Video Exam    Vitals:    06/16/23 1417   BP: 126/64       Physical Exam     Visit Time  Total Visit Duration: 15 mins      "

## 2023-06-20 ENCOUNTER — APPOINTMENT (OUTPATIENT)
Dept: LAB | Facility: HOSPITAL | Age: 38
End: 2023-06-20
Payer: COMMERCIAL

## 2023-06-20 ENCOUNTER — TELEPHONE (OUTPATIENT)
Dept: NEPHROLOGY | Facility: CLINIC | Age: 38
End: 2023-06-20

## 2023-06-20 NOTE — TELEPHONE ENCOUNTER
They usually send over an order sheet to do that, can you please ask for that to be faxed and we will sign

## 2023-06-20 NOTE — TELEPHONE ENCOUNTER
Jemima Calvillo the infusion nurse called stating patient is in the hospital and she needs you to put on resemption  orders in so she can infuse patient

## 2023-06-26 ENCOUNTER — APPOINTMENT (OUTPATIENT)
Dept: LAB | Facility: HOSPITAL | Age: 38
End: 2023-06-26
Payer: COMMERCIAL

## 2023-07-03 ENCOUNTER — APPOINTMENT (OUTPATIENT)
Dept: LAB | Facility: HOSPITAL | Age: 38
End: 2023-07-03
Payer: COMMERCIAL

## 2023-07-10 ENCOUNTER — APPOINTMENT (OUTPATIENT)
Dept: LAB | Facility: HOSPITAL | Age: 38
End: 2023-07-10
Payer: COMMERCIAL

## 2023-07-17 ENCOUNTER — APPOINTMENT (OUTPATIENT)
Dept: LAB | Facility: HOSPITAL | Age: 38
End: 2023-07-17
Payer: COMMERCIAL

## 2023-08-01 ENCOUNTER — APPOINTMENT (OUTPATIENT)
Dept: LAB | Facility: HOSPITAL | Age: 38
End: 2023-08-01
Payer: COMMERCIAL

## 2023-08-07 ENCOUNTER — APPOINTMENT (EMERGENCY)
Dept: CT IMAGING | Facility: HOSPITAL | Age: 38
End: 2023-08-07
Payer: COMMERCIAL

## 2023-08-07 ENCOUNTER — HOSPITAL ENCOUNTER (EMERGENCY)
Facility: HOSPITAL | Age: 38
Discharge: HOME | End: 2023-08-07
Attending: STUDENT IN AN ORGANIZED HEALTH CARE EDUCATION/TRAINING PROGRAM | Admitting: STUDENT IN AN ORGANIZED HEALTH CARE EDUCATION/TRAINING PROGRAM
Payer: COMMERCIAL

## 2023-08-07 ENCOUNTER — APPOINTMENT (EMERGENCY)
Dept: RADIOLOGY | Facility: HOSPITAL | Age: 38
End: 2023-08-07
Payer: COMMERCIAL

## 2023-08-07 VITALS
HEIGHT: 62 IN | WEIGHT: 114.64 LBS | SYSTOLIC BLOOD PRESSURE: 121 MMHG | BODY MASS INDEX: 21.1 KG/M2 | RESPIRATION RATE: 18 BRPM | TEMPERATURE: 97.9 F | OXYGEN SATURATION: 100 % | DIASTOLIC BLOOD PRESSURE: 55 MMHG | HEART RATE: 74 BPM

## 2023-08-07 DIAGNOSIS — S32.2XXA FRACTURE OF COCCYX, INITIAL ENCOUNTER FOR CLOSED FRACTURE (HCC): ICD-10-CM

## 2023-08-07 DIAGNOSIS — R55 SYNCOPE AND COLLAPSE: Primary | ICD-10-CM

## 2023-08-07 LAB
ANION GAP SERPL CALCULATED.3IONS-SCNC: 8 MMOL/L
BASOPHILS # BLD AUTO: 0.11 THOUSANDS/ÂΜL (ref 0–0.1)
BASOPHILS NFR BLD AUTO: 2 % (ref 0–1)
BUN SERPL-MCNC: 18 MG/DL (ref 5–25)
CALCIUM SERPL-MCNC: 8.5 MG/DL (ref 8.4–10.2)
CHLORIDE SERPL-SCNC: 107 MMOL/L (ref 96–108)
CO2 SERPL-SCNC: 18 MMOL/L (ref 21–32)
CREAT SERPL-MCNC: 0.94 MG/DL (ref 0.6–1.3)
EOSINOPHIL # BLD AUTO: 0.09 THOUSAND/ÂΜL (ref 0–0.61)
EOSINOPHIL NFR BLD AUTO: 1 % (ref 0–6)
ERYTHROCYTE [DISTWIDTH] IN BLOOD BY AUTOMATED COUNT: 16.9 % (ref 11.6–15.1)
GFR SERPL CREATININE-BSD FRML MDRD: 76 ML/MIN/1.73SQ M
GLUCOSE SERPL-MCNC: 89 MG/DL (ref 65–140)
HCT VFR BLD AUTO: 33.3 % (ref 34.8–46.1)
HGB BLD-MCNC: 9.9 G/DL (ref 11.5–15.4)
IMM GRANULOCYTES # BLD AUTO: 0.02 THOUSAND/UL (ref 0–0.2)
IMM GRANULOCYTES NFR BLD AUTO: 0 % (ref 0–2)
LYMPHOCYTES # BLD AUTO: 2.28 THOUSANDS/ÂΜL (ref 0.6–4.47)
LYMPHOCYTES NFR BLD AUTO: 32 % (ref 14–44)
MAGNESIUM SERPL-MCNC: 2.3 MG/DL (ref 1.9–2.7)
MCH RBC QN AUTO: 23.7 PG (ref 26.8–34.3)
MCHC RBC AUTO-ENTMCNC: 29.7 G/DL (ref 31.4–37.4)
MCV RBC AUTO: 80 FL (ref 82–98)
MONOCYTES # BLD AUTO: 0.58 THOUSAND/ÂΜL (ref 0.17–1.22)
MONOCYTES NFR BLD AUTO: 8 % (ref 4–12)
NEUTROPHILS # BLD AUTO: 4.02 THOUSANDS/ÂΜL (ref 1.85–7.62)
NEUTS SEG NFR BLD AUTO: 57 % (ref 43–75)
NRBC BLD AUTO-RTO: 0 /100 WBCS
PLATELET # BLD AUTO: 431 THOUSANDS/UL (ref 149–390)
PMV BLD AUTO: 10.6 FL (ref 8.9–12.7)
POTASSIUM SERPL-SCNC: 3.9 MMOL/L (ref 3.5–5.3)
RBC # BLD AUTO: 4.18 MILLION/UL (ref 3.81–5.12)
SODIUM SERPL-SCNC: 133 MMOL/L (ref 135–147)
WBC # BLD AUTO: 7.1 THOUSAND/UL (ref 4.31–10.16)

## 2023-08-07 PROCEDURE — G1004 CDSM NDSC: HCPCS

## 2023-08-07 PROCEDURE — 72220 X-RAY EXAM SACRUM TAILBONE: CPT

## 2023-08-07 PROCEDURE — 80048 BASIC METABOLIC PNL TOTAL CA: CPT | Performed by: STUDENT IN AN ORGANIZED HEALTH CARE EDUCATION/TRAINING PROGRAM

## 2023-08-07 PROCEDURE — 93005 ELECTROCARDIOGRAM TRACING: CPT

## 2023-08-07 PROCEDURE — 70450 CT HEAD/BRAIN W/O DYE: CPT

## 2023-08-07 PROCEDURE — 96361 HYDRATE IV INFUSION ADD-ON: CPT

## 2023-08-07 PROCEDURE — 99284 EMERGENCY DEPT VISIT MOD MDM: CPT

## 2023-08-07 PROCEDURE — 83735 ASSAY OF MAGNESIUM: CPT | Performed by: STUDENT IN AN ORGANIZED HEALTH CARE EDUCATION/TRAINING PROGRAM

## 2023-08-07 PROCEDURE — 96374 THER/PROPH/DIAG INJ IV PUSH: CPT

## 2023-08-07 PROCEDURE — 85025 COMPLETE CBC W/AUTO DIFF WBC: CPT | Performed by: STUDENT IN AN ORGANIZED HEALTH CARE EDUCATION/TRAINING PROGRAM

## 2023-08-07 PROCEDURE — 36415 COLL VENOUS BLD VENIPUNCTURE: CPT | Performed by: STUDENT IN AN ORGANIZED HEALTH CARE EDUCATION/TRAINING PROGRAM

## 2023-08-07 RX ORDER — OXYCODONE HYDROCHLORIDE 5 MG/1
5 TABLET ORAL EVERY 6 HOURS PRN
Qty: 10 TABLET | Refills: 0 | Status: SHIPPED | OUTPATIENT
Start: 2023-08-07

## 2023-08-07 RX ORDER — OXYCODONE HYDROCHLORIDE AND ACETAMINOPHEN 5; 325 MG/1; MG/1
1 TABLET ORAL ONCE
Status: COMPLETED | OUTPATIENT
Start: 2023-08-07 | End: 2023-08-07

## 2023-08-07 RX ORDER — MORPHINE SULFATE 10 MG/ML
6 INJECTION, SOLUTION INTRAMUSCULAR; INTRAVENOUS ONCE
Status: COMPLETED | OUTPATIENT
Start: 2023-08-07 | End: 2023-08-07

## 2023-08-07 RX ORDER — MORPHINE SULFATE 4 MG/ML
4 INJECTION, SOLUTION INTRAMUSCULAR; INTRAVENOUS ONCE
Status: DISCONTINUED | OUTPATIENT
Start: 2023-08-07 | End: 2023-08-07

## 2023-08-07 RX ADMIN — OXYCODONE AND ACETAMINOPHEN 1 TABLET: 5; 325 TABLET ORAL at 16:03

## 2023-08-07 RX ADMIN — SODIUM CHLORIDE 1000 ML: 0.9 INJECTION, SOLUTION INTRAVENOUS at 16:04

## 2023-08-07 RX ADMIN — MORPHINE SULFATE 6 MG: 10 INJECTION, SOLUTION INTRAMUSCULAR; INTRAVENOUS at 17:06

## 2023-08-07 NOTE — ED PROVIDER NOTES
History  Chief Complaint   Patient presents with   • Syncope     Patient reports she was walking out to the kitchen to make a sandwich and the next thing she remembers is her daughter talking to her on the floor. Complaining of tailbone pain. No blood thinners. History provided by:  Medical records and patient     43year old F. Presents s/p presumed syncopal episode. She states that she remembers walking into the kitchen then being on the kitchen floor. Unknown head injury. The patient was feeling tired/lightheaded when walking to the kitchen. Denies nausea/vomiting, changes in vision. No known hx of syncope. No familiar hx of sudden cardiac death or drowning. Expressing coccygeal, left shoulder pain. Did not take anything for pain. Denies chest pain, shortness of breath. S/p hysterectomy in . Prior to Admission Medications   Prescriptions Last Dose Informant Patient Reported? Taking? Creon [id number] units CPEP   Yes Yes   Syringe/Needle, Disp, (SYRINGE 3CC/25GX5/8") 25G X 5/8" 3 ML MISC  Self No Yes   Sig: Use once monthly for B12 injection.    acetaminophen (TYLENOL) 325 mg tablet   Yes Yes   amitriptyline (ELAVIL) 10 mg tablet  Self No Yes   Sig: Take 2 tablets (20 mg total) by mouth daily at bedtime   cyanocobalamin 1,000 mcg/mL  Self No Yes   Sig: Inject 1 mL (1,000 mcg total) into a muscle every 30 (thirty) days Next dose due 2023   dicyclomine (BENTYL) 10 mg capsule   Yes Yes   dicyclomine (BENTYL) 20 mg tablet   No No   Sig: Take 1 tablet (20 mg total) by mouth 4 (four) times a day as needed (cramping) for up to 3 days   enoxaparin (LOVENOX) 40 mg/0.4 mL   Yes Yes   ergocalciferol (ERGOCALCIFEROL) 1.25 MG (43621 UT) capsule  Self No Yes   Sig: Take 1 capsule (50,000 Units total) by mouth once a week  and    escitalopram (LEXAPRO) 20 mg tablet  Self Yes Yes   Si mg daily at bedtime   ondansetron (ZOFRAN) 4 mg tablet  Self No Yes   Sig: Take 1 tablet (4 mg total) by mouth every 8 (eight) hours as needed for nausea or vomiting   ondansetron (ZOFRAN-ODT) 4 mg disintegrating tablet   Yes Yes   potassium chloride 0.4 mEq/mL  Self No Yes   Sig: Inject 200 mL (80 mEq total) into a catheter in a vein over 8 hours at 25 mL/hr daily 80 meq in 1 litre bag to be infused daily over 8 hours   Continue as ordered prior to admission   simethicone (MYLICON) 80 mg chewable tablet   Yes Yes   traMADol (ULTRAM) 50 mg tablet   Yes Yes   vitamin A 3 MG (09750 UT) capsule   Yes Yes   Sig: Take 1 capsule by mouth 2 (two) times a day   vitamin k 100 MCG tablet   Yes Yes   Sig: Take 1 tablet by mouth daily   zinc sulfate (ZINCATE) 220 mg capsule   Yes Yes   Sig: Take 1 capsule by mouth daily      Facility-Administered Medications: None       Past Medical History:   Diagnosis Date   • C. difficile colitis    • COVID-19     2022- pt denies long covid   • DUB (dysfunctional uterine bleeding)    • H. pylori infection    • Hypertension    • Hypokalemia    • Kidney stone 2023   • Left lower quadrant abdominal pain 2020   • Migraine    • Pancreatitis    • Psychiatric disorder     Anxiety   • Rectal bleeding    • Renal disorder    • Rhabdomyolysis    • Thrombosed external hemorrhoid        Past Surgical History:   Procedure Laterality Date   • ABDOMINAL SURGERY     • APPENDECTOMY     •  SECTION     • CHOLECYSTECTOMY     • COSMETIC SURGERY      "tummy tuck"   • FL GUIDED CENTRAL VENOUS ACCESS DEVICE INSERTION  2018   • FL RETROGRADE PYELOGRAM  1/3/2023   • GASTRIC BYPASS     • HYSTERECTOMY     • LAPAROSCOPY     • NV ANRCT XM SURG REQ ANES GENERAL SPI/EDRL DX N/A 2021    Procedure: ANAL EXAM UNDER ANESTHESIA; HEMORRHOIDECTOMY;  Surgeon: Shelley Pate DO;  Location:  MAIN OR;  Service: General   • NV CYSTO/URETERO W/LITHOTRIPSY &INDWELL STENT INSRT Left 1/3/2023    Procedure: CYSTOSCOPY URETEROSCOPY WITH RETROGRADE PYELOGRAM, BASKET STONE EXTRACTION AND INSERTION STENT URETERAL;  Surgeon: Philly Torres MD;  Location:  MAIN OR;  Service: Urology   • MT EXC THROMBOSED HEMORRHOID [facility] N/A 7/8/2022    Procedure: EXCISION OF THROMBOSED EXTERNAL HEMORRHOID, Excision of perianal skin tag, dranage of perianal abscess, anal fistulatomy;  Surgeon: Master Clinton DO;  Location:  MAIN OR;  Service: General   • TUNNELED VENOUS PORT PLACEMENT N/A 12/21/2018    Procedure: INSERTION VENOUS PORT (PORT-A-CATH); Surgeon: Srinivas Griffin DO;  Location: [facility] MAIN OR;  Service: General       Family History   Problem Relation Age of Onset   • No Known Problems Mother    • Hypertension Father    • Diabetes Father    • Urolithiasis Father    • Prostate cancer Father    • Diabetes Maternal Grandfather      I have reviewed and agree with the history as documented. E-Cigarette/Vaping   • E-Cigarette Use Never User      E-Cigarette/Vaping Substances   • Nicotine No    • THC No    • CBD No    • Flavoring No    • Other No    • Unknown No      Social History     Tobacco Use   • Smoking status: Never   • Smokeless tobacco: Never   Vaping Use   • Vaping Use: Never used   Substance Use Topics   • Alcohol use: Not Currently   • Drug use: Yes     Types: Marijuana     Comment: Medical marijuana       Review of Systems   Constitutional: Negative for activity change, appetite change, chills, diaphoresis, fatigue and fever. HENT: Negative for congestion, dental problem, rhinorrhea, sinus pressure, sinus pain and sore throat. Eyes: Negative for pain, discharge, redness, itching and visual disturbance. Respiratory: Negative for cough, chest tightness, shortness of breath and wheezing. Cardiovascular: Negative for chest pain, palpitations and leg swelling. Gastrointestinal: Negative for abdominal distention, abdominal pain, constipation, diarrhea, nausea and vomiting.    Genitourinary: Negative for decreased urine volume, difficulty urinating, dysuria, flank pain, frequency and urgency. Musculoskeletal: Positive for arthralgias, back pain and gait problem. Negative for myalgias, neck pain and neck stiffness. Skin: Negative for color change, pallor, rash and wound. Neurological: Positive for syncope, light-headedness and headaches. Negative for dizziness, facial asymmetry, weakness and numbness. Hematological: Does not bruise/bleed easily. Psychiatric/Behavioral: Negative for agitation, confusion, decreased concentration and sleep disturbance. The patient is not nervous/anxious. All other systems reviewed and are negative. Physical Exam  Physical Exam  Vitals and nursing note reviewed. Constitutional:       General: She is not in acute distress. Appearance: She is not ill-appearing or toxic-appearing. HENT:      Head: Normocephalic and atraumatic. Right Ear: Tympanic membrane, ear canal and external ear normal.      Left Ear: Tympanic membrane, ear canal and external ear normal.      Nose: No congestion or rhinorrhea. Mouth/Throat:      Mouth: Mucous membranes are moist.      Pharynx: Oropharynx is clear. No oropharyngeal exudate or posterior oropharyngeal erythema. Eyes:      General: No scleral icterus. Right eye: No discharge. Left eye: No discharge. Extraocular Movements: Extraocular movements intact. Conjunctiva/sclera: Conjunctivae normal.      Pupils: Pupils are equal, round, and reactive to light. Cardiovascular:      Rate and Rhythm: Normal rate and regular rhythm. Pulses: Normal pulses. Heart sounds: Normal heart sounds. No murmur heard. Pulmonary:      Effort: Pulmonary effort is normal. No respiratory distress. Breath sounds: Normal breath sounds. No stridor. No wheezing, rhonchi or rales. Chest:      Chest wall: No tenderness. Abdominal:      General: Abdomen is flat. Bowel sounds are normal. There is no distension. Palpations: Abdomen is soft. There is no mass. Tenderness:  There is no abdominal tenderness. There is no right CVA tenderness, left CVA tenderness, guarding or rebound. Hernia: No hernia is present. Musculoskeletal:         General: Tenderness present. No swelling, deformity or signs of injury. Cervical back: Neck supple. No tenderness. Back:       Right lower leg: No edema. Left lower leg: No edema. Comments: TTP along the coccyx, sacrum and midline lower back. No ecchymosis or other overlying skin changes. Skin:     General: Skin is warm and dry. Capillary Refill: Capillary refill takes less than 2 seconds. Coloration: Skin is not jaundiced or pale. Findings: No bruising, erythema, lesion or rash. Neurological:      General: No focal deficit present. Mental Status: She is alert and oriented to person, place, and time. Cranial Nerves: No cranial nerve deficit. Sensory: No sensory deficit. Motor: No weakness. Coordination: Coordination normal.   Psychiatric:         Mood and Affect: Mood normal.         Behavior: Behavior normal.         Thought Content:  Thought content normal.         Judgment: Judgment normal.         Vital Signs  ED Triage Vitals [08/07/23 1528]   Temperature Pulse Respirations Blood Pressure SpO2   97.9 °F (36.6 °C) 71 18 121/55 100 %      Temp Source Heart Rate Source Patient Position - Orthostatic VS BP Location FiO2 (%)   Temporal Monitor Lying Left arm --      Pain Score       9           Vitals:    08/07/23 1615 08/07/23 1630 08/07/23 1645 08/07/23 1700   BP:       Pulse: 72 79 79 74   Patient Position - Orthostatic VS:             Visual Acuity      ED Medications  Medications   oxyCODONE-acetaminophen (PERCOCET) 5-325 mg per tablet 1 tablet (1 tablet Oral Given 8/7/23 1603)   sodium chloride 0.9 % bolus 1,000 mL (0 mL Intravenous Stopped 8/7/23 1647)   morphine injection 6 mg (6 mg Intravenous Given 8/7/23 1706)       Diagnostic Studies  Results Reviewed     Procedure Component Value Units Date/Time    Basic metabolic panel [[id number]]  (Abnormal) Collected: 08/07/23 1602    Lab Status: Final result Specimen: Blood from Arm, Right Updated: 08/07/23 1637     Sodium 133 mmol/L      Potassium 3.9 mmol/L      Chloride 107 mmol/L      CO2 18 mmol/L      ANION GAP 8 mmol/L      BUN 18 mg/dL      Creatinine 0.94 mg/dL      Glucose 89 mg/dL      Calcium 8.5 mg/dL      eGFR 76 ml/min/1.73sq m     Narrative:      [facility] guidelines for Chronic Kid[facility] Disease (CKD):   •  Stage 1 with normal or high GFR (GFR > 90 mL/min/1.73 square meters)  •  Stage 2 Mild CKD (GFR = 60-89 mL/min/1.73 square meters)  •  Stage 3A Moderate CKD (GFR = 45-59 mL/min/1.73 square meters)  •  Stage 3B Moderate CKD (GFR = 30-44 mL/min/1.73 square meters)  •  Stage 4 Severe CKD (GFR = 15-29 mL/min/1.73 square meters)  •  Stage 5 End Stage CKD (GFR <15 mL/min/1.73 square meters)  Note: GFR calculation is accurate only with a steady state creatinine    Magnesium [[id number]]  (Normal) Collected: 08/07/23 1602    Lab Status: Final result Specimen: Blood from Arm, Right Updated: 08/07/23 1634     Magnesium 2.3 mg/dL     CBC and differential [[id number]]  (Abnormal) Collected: 08/07/23 1602    Lab Status: Final result Specimen: Blood from Arm, Right Updated: 08/07/23 1614     WBC 7.10 Thousand/uL      RBC 4.18 Million/uL      Hemoglobin 9.9 g/dL      Hematocrit 33.3 %      MCV 80 fL      MCH 23.7 pg      MCHC 29.7 g/dL      RDW 16.9 %      MPV 10.6 fL      Platelets 662 Thousands/uL      nRBC 0 /100 WBCs      Neutrophils Relative 57 %      Immat GRANS % 0 %      Lymphocytes Relative 32 %      Monocytes Relative 8 %      Eosinophils Relative 1 %      Basophils Relative 2 %      Neutrophils Absolute 4.02 Thousands/µL      Immature Grans Absolute 0.02 Thousand/uL      Lymphocytes Absolute 2.28 Thousands/µL      Monocytes Absolute 0.58 Thousand/µL      Eosinophils Absolute 0.09 Thousand/µL      Basophils Absolute 0.11 Thousands/µL                  XR sacrum and coccyx   ED Interpretation by Melody Eubanks DO (08/07 1649)   Coccygeal fracture      Final Result by Lg Howard DO (08/07 1645)      Discontinuity at the S4-5 junction along the anterior cortical surface suggestive of fracture. This discontinuity is new from the prior CT dated Jasmin 10, 2023. Workstation performed: [id number]         CT head without contrast   Final Result by Lg Howard DO (08/07 1645)      No acute intracranial abnormality. Workstation performed: [id number]                    Procedures  ECG 12 Lead Documentation Only    Date/Time: 8/7/2023 3:49 PM    Performed by: Melody Eubanks DO  Authorized by: Melody Eubanks DO    Indications / Diagnosis:  Syncope  ECG reviewed by me, the ED Provider: yes    Patient location:  ED  Previous ECG:     Previous ECG:  Unavailable  Interpretation:     Interpretation: normal    Rate:     ECG rate:  77    ECG rate assessment: normal    Rhythm:     Rhythm: sinus rhythm    Ectopy:     Ectopy: none    QRS:     QRS axis:  Normal    QRS intervals:  Normal  Conduction:     Conduction: normal    ST segments:     ST segments:  Normal  T waves:     T waves: normal               ED Course  ED Course as of 08/07/23 1805   Mon Aug 07, 2023   1618 No leukocytosis. Hemoglobin/platelet count are at baseline. 1642 No significant abnormalities noted on BMP. X-ray imaging concerning for coccyx fracture. 46 CT imaging without acute findings. 46 X-ray with signs of coccygeal fracture   1652 Discussed the case with orthopedics. Can follow-up as an outpatient. Upon reevaluation, the patient continues to express coccygeal pain. Will administer a dose of IV morphine. 1656 ECG showing normal sinus rhythm. Heart rate 77 bpm.  No acute ischemic changes. No unstable arrhythmias. Normal axis.                                SBIRT 20yo+    Flowsheet Row Most Recent Value Initial Alcohol Screen: US AUDIT-C     1. How often do you have a drink containing alcohol? 0 Filed at: 08/07/2023 1529   2. How many drinks containing alcohol do you have on a typical day you are drinking? 0 Filed at: 08/07/2023 1529   3b. FEMALE Any Age, or MALE 65+: How often do you have 4 or more drinks on one occassion? 0 Filed at: 08/07/2023 1529   Audit-C Score 0 Filed at: 08/07/2023 1529   SLIME: How many times in the past year have you. .. Used an illegal drug or used a prescription medication for non-medical reasons? Never Filed at: 08/07/2023 1529                    Medical Decision Making  The differential diagnoses include but are not limited to vasovagal syncope, dehydration, acute kidney injury, coccygeal fracture, pulmonary embolism  Vital signs reviewed. Awake/alert/oriented. No focal neurological deficit. Exquisite tenderness to palpation along the coccyx. No other signs of external trauma. ECG without acute ischemic changes/unstable arrhythmias. Laboratory work-up unremarkable. Imaging interpretation above. Outpatient orthopedics referral provided. Tylenol recommended for pain. PRN Oxycodone provided for breakthrough pain. Other instructions/return precautions were discussed with the patient. She expressed understanding. All questions addressed. Stable for discharge. Fracture of coccyx, initial encounter for closed fracture [facility]): acute illness or injury  Syncope and collapse: acute illness or injury  Amount and/or Complexity of Data Reviewed  Labs: ordered. Decision-making details documented in ED Course. Radiology: ordered and independent interpretation performed. Decision-making details documented in ED Course. ECG/medicine tests: ordered and independent interpretation performed. Decision-making details documented in ED Course. Risk  Prescription drug management. Parenteral controlled substances.           Disposition  Final diagnoses:   Syncope and collapse Fracture of coccyx, initial encounter for closed fracture [facility])     Time reflects when diagnosis was documented in both MDM as applicable and the Disposition within this note     Time User Action Codes Description Comment    8/7/2023  4:55 PM Lovely Neetu Add [R55] Syncope and collapse     8/7/2023  4:55 PM Lovely Neetu Add [S32. 2XXA] Fracture of coccyx, initial encounter for closed fracture [facility])       ED Disposition     ED Disposition   Discharge    Condition   Stable    Date/Time   Mon Aug 7, 2023  4:58 PM    Comment   Jesus Pruitt discharge to home/self care.                Follow-up Information     Follow up With Specialties Details Why [address] [facility] In 1 week  [address] [address]  [phone]            Discharge Medication List as of 8/7/2023  5:13 PM      START taking these medications    Details   oxyCODONE (Roxicodone) 5 immediate release tablet Take 1 tablet (5 mg total) by mouth every 6 (six) hours as needed for moderate pain or severe pain Max Daily Amount: 20 mg, Starting Mon 8/7/2023, Normal         CONTINUE these medications which have NOT CHANGED    Details   acetaminophen (TYLENOL) 325 mg tablet Starting Fri 6/16/2023, Historical Med      amitriptyline (ELAVIL) 10 mg tablet Take 2 tablets (20 mg total) by mouth daily at bedtime, Starting Sun 5/1/2022, Normal      Creon [id number] units CPEP Starting Wed 5/31/2023, Historical Med      cyanocobalamin 1,000 mcg/mL Inject 1 mL (1,000 mcg total) into a muscle every 30 (thirty) days Next dose due 5/1/2023, Starting Tue 4/18/2023, No Print      dicyclomine (BENTYL) 10 mg capsule Starting Wed 6/14/2023, Historical Med      enoxaparin (LOVENOX) 40 mg/0.4 mL Starting Fri 6/16/2023, Historical Med      ergocalciferol (ERGOCALCIFEROL) 1.25 MG (75482 UT) capsule Take 1 capsule (50,000 Units total) by mouth once a week Mondays and Thursdays, Starting Tue 4/18/2023, No Print escitalopram (LEXAPRO) 20 mg tablet 20 mg daily at bedtime, Starting Tue 3/14/2023, Historical Med      ondansetron (ZOFRAN) 4 mg tablet Take 1 tablet (4 mg total) by mouth every 8 (eight) hours as needed for nausea or vomiting, Starting Sun 3/6/2022, Normal      ondansetron (ZOFRAN-ODT) 4 mg disintegrating tablet Starting Mon 6/12/2023, Historical Med      potassium chloride 0.4 mEq/mL Inject 200 mL (80 mEq total) into a catheter in a vein over 8 hours at 25 mL/hr daily 80 meq in 1 litre bag to be infused daily over 8 hours   Continue as ordered prior to admission, Starting Fri 5/26/2023, Print      simethicone (MYLICON) 80 mg chewable tablet Starting Fri 6/16/2023, Historical Med      Syringe/Needle, Disp, (SYRINGE 3CC/25GX5/8") 25G X 5/8" 3 ML MISC Use once monthly for B12 injection. , Normal      traMADol (ULTRAM) 50 mg tablet Starting Fri 6/16/2023, Historical Med      vitamin A 3 MG (50359 UT) capsule Take 1 capsule by mouth 2 (two) times a day, Starting Thu 6/22/2023, Historical Med      vitamin k 100 MCG tablet Take 1 tablet by mouth daily, Starting Tue 6/20/2023, Historical Med      zinc sulfate (ZINCATE) 220 mg capsule Take 1 capsule by mouth daily, Starting Tue 6/20/2023, Until Thu 10/19/2023, Historical Med      dicyclomine (BENTYL) 20 mg tablet Take 1 tablet (20 mg total) by mouth 4 (four) times a day as needed (cramping) for up to 3 days, Starting Fri 5/26/2023, Until Mon 5/29/2023 at 2359, Normal                 PDMP Review       Value Time User    PDMP Reviewed  Yes 6/10/2023  5:16 PM Monik Vázquez MD          ED Provider  Electronically Signed by           Kp Sequeira DO  08/07/23 8081

## 2023-08-07 NOTE — ED NOTES
PT requested use of her port. This RN assessed port, port was not capped, flushed with ease, no blood return noted. Port flushed and clamped, curos cap placed on port. Peripheral IV placed, curos cap placed on IV as well.       Cris Mccrary RN  08/07/23 [id number]

## 2023-08-07 NOTE — DISCHARGE INSTRUCTIONS
The x-ray that was obtained showed a fracture of your coccyx. For pain, he can take Tylenol 1000 mg every 6 hours. You are also being prescribed a course of oxycodone for breakthrough pain. Please take as directed. In addition to the above medications, use a sitting donut and stool softeners over the next few days. An outpatient referral to orthopedics was provided. Expect a phone call within the next few days to set up the appointment. Do not hesitate to be reevaluated the emergency department for any concerning signs or symptoms.

## 2023-08-08 LAB
ATRIAL RATE: 77 BPM
P AXIS: 66 DEGREES
PR INTERVAL: 118 MS
QRS AXIS: 66 DEGREES
QRSD INTERVAL: 86 MS
QT INTERVAL: 370 MS
QTC INTERVAL: 418 MS
T WAVE AXIS: 53 DEGREES
VENTRICULAR RATE: 77 BPM

## 2023-08-11 ENCOUNTER — TELEPHONE (OUTPATIENT)
Age: 38
End: 2023-08-11

## 2023-08-11 ENCOUNTER — OFFICE VISIT (OUTPATIENT)
Dept: OBGYN CLINIC | Facility: HOSPITAL | Age: 38
End: 2023-08-11
Payer: COMMERCIAL

## 2023-08-11 VITALS
DIASTOLIC BLOOD PRESSURE: 65 MMHG | HEIGHT: 62 IN | BODY MASS INDEX: 21.1 KG/M2 | SYSTOLIC BLOOD PRESSURE: 102 MMHG | HEART RATE: 71 BPM | WEIGHT: 114.64 LBS

## 2023-08-11 DIAGNOSIS — S32.2XXA FRACTURE OF COCCYX, INITIAL ENCOUNTER FOR CLOSED FRACTURE (HCC): ICD-10-CM

## 2023-08-11 PROCEDURE — 99204 OFFICE O/P NEW MOD 45 MIN: CPT | Performed by: ORTHOPAEDIC SURGERY

## 2023-08-11 RX ORDER — TRAMADOL HYDROCHLORIDE 50 MG/1
50 TABLET ORAL EVERY 6 HOURS PRN
Status: CANCELLED | OUTPATIENT
Start: 2023-08-11

## 2023-08-11 RX ORDER — TRAMADOL HYDROCHLORIDE 50 MG/1
50 TABLET ORAL EVERY 6 HOURS PRN
Qty: 30 TABLET | Refills: 0 | Status: SHIPPED | OUTPATIENT
Start: 2023-08-11

## 2023-08-11 NOTE — TELEPHONE ENCOUNTER
Caller: patient    Doctor: Dr Jesus Núñez    Reason for call: patient is calling bc she was told there would be a pain med called in for her but her pharmacy has not received it yet.     Grzegorz Bird in [place]    Call back#: [phone]

## 2023-08-11 NOTE — PROGRESS NOTES
NAME: Devorah Chavez  : 1985  PCP: Adriana Rothman MD      Chief Complaint:     HPI:  Devorah Chavez is a 43 y.o. female presenting for initial visit with chief complaint of of buttock pain following a fall secondary to a syncopal episode on 2023. She states that she was in her kitchen and fell directly onto her buttock. She is unsure if she passed out. However she notes that she came to sitting on the floor. She did suffer other bruises secondary to the bread box hitting her arms and legs as she fell. She was evaluated at the emergency department on 2023 and did have a CT scan of her head which demonstrated no evidence of intracranial bleeding or injury. She localizes pain into the area of her tailbone and describes as moderate to severe with direct pressure or prolonged standing. She does utilize an inflatable pad which does alleviate some of the pain while sitting. She states that pain can radiate into the right side of the lumbar spine that is also moderate to severe and can be sharp in quality. He denies any radicular symptoms down the legs. She is currently taking oxycodone 5mg every 6 hours and notes that does take the edge off her pain in order to sleep. She does also utilize ice and states that this also provides her with some symptomatic relief. She denies any incontinence issues or bowel changes secondary to her fall. She utilizes a stool softener. She denies any prior history of injury or surgery of her lumbar spine, hip or pelvis. She denies history of nicotine dependence. Conservative therapy includes the following:   Oxycodone every 6 hours over the past 5 days. No injection. No physical therapy.         The patient has noticed significant impairment in performing the following ADLs: Walking, twisting, bending, lifting        FAMILY HISTORY   Family History   Problem Relation Age of Onset   • No Known Problems Mother    • Hypertension Father    • Diabetes Father    • Urolithiasis Father    • Prostate cancer Father    • Diabetes Maternal Grandfather        PAST MEDICAL HISTORY:   Past Medical History:   Diagnosis Date   • C. difficile colitis 2016   • COVID-19     1/2022- pt denies long covid   • DUB (dysfunctional uterine bleeding)    • H. pylori infection    • Hypertension    • Hypokalemia    • Kidney stone November 2023   • Left lower quadrant abdominal pain 08/17/2020   • Migraine    • Pancreatitis    • Psychiatric disorder     Anxiety   • Rectal bleeding    • Renal disorder    • Rhabdomyolysis    • Thrombosed external hemorrhoid        MEDICATIONS:  Current Outpatient Medications   Medication Sig Dispense Refill   • acetaminophen (TYLENOL) 325 mg tablet      • amitriptyline (ELAVIL) 10 mg tablet Take 2 tablets (20 mg total) by mouth daily at bedtime 60 tablet 0   • cyanocobalamin 1,000 mcg/mL Inject 1 mL (1,000 mcg total) into a muscle every 30 (thirty) days Next dose due 5/1/2023     • ergocalciferol (ERGOCALCIFEROL) 1.25 MG (98655 UT) capsule Take 1 capsule (50,000 Units total) by mouth once a week Mondays and Thursdays     • escitalopram (LEXAPRO) 20 mg tablet 20 mg daily at bedtime     • ondansetron (ZOFRAN) 4 mg tablet Take 1 tablet (4 mg total) by mouth every 8 (eight) hours as needed for nausea or vomiting 20 tablet 0   • ondansetron (ZOFRAN-ODT) 4 mg disintegrating tablet      • oxyCODONE (Roxicodone) 5 immediate release tablet Take 1 tablet (5 mg total) by mouth every 6 (six) hours as needed for moderate pain or severe pain Max Daily Amount: 20 mg 10 tablet 0   • potassium chloride 0.4 mEq/mL Inject 200 mL (80 mEq total) into a catheter in a vein over 8 hours at 25 mL/hr daily 80 meq in 1 litre bag to be infused daily over 8 hours   Continue as ordered prior to admission 2800 mL 0   • Syringe/Needle, Disp, (SYRINGE 3CC/25GX5/8") 25G X 5/8" 3 ML MISC Use once monthly for B12 injection.  1 each 11   • traMADol (ULTRAM) 50 mg tablet      • vitamin A 3 MG (13914 UT) capsule Take 1 capsule by mouth 2 (two) times a day     • vitamin k 100 MCG tablet Take 1 tablet by mouth daily     • zinc sulfate (ZINCATE) 220 mg capsule Take 1 capsule by mouth daily       No current facility-administered medications for this visit. PAST SURGICAL HISTORY:  Past Surgical History:   Procedure Laterality Date   • ABDOMINAL SURGERY     • APPENDECTOMY     •  SECTION     • CHOLECYSTECTOMY     • COSMETIC SURGERY      "tummy tuck"   • FL GUIDED CENTRAL VENOUS ACCESS DEVICE INSERTION  2018   • FL RETROGRADE PYELOGRAM  1/3/2023   • GASTRIC BYPASS     • HYSTERECTOMY     • LAPAROSCOPY     • PA ANRCT XM SURG REQ ANES GENERAL SPI/EDRL DX N/A 2021    Procedure: ANAL EXAM UNDER ANESTHESIA; HEMORRHOIDECTOMY;  Surgeon: Maile Keen DO;  Location: [facility];  Service: General   • PA CYSTO/URETERO W/LITHOTRIPSY &INDWELL STENT INSRT Left 1/3/2023    Procedure: CYSTOSCOPY URETEROSCOPY WITH RETROGRADE PYELOGRAM, BASKET STONE EXTRACTION AND INSERTION STENT URETERAL;  Surgeon: Sin Shelby MD;  Location: [facility];  Service: Urology   • PA EXC THROMBOSED HEMORRHOID [facility] Mourning N/A 2022    Procedure: EXCISION OF THROMBOSED EXTERNAL HEMORRHOID, Excision of perianal skin tag, dranage of perianal abscess, anal fistulatomy;  Surgeon: Maile Keen DO;  Location:  MAIN OR;  Service: General   • TUNNELED VENOUS PORT PLACEMENT N/A 2018    Procedure: INSERTION VENOUS PORT (PORT-A-CATH);   Surgeon: Kiko Becker DO;  Location: [facility];  Service: General       SOCIAL HISTORY:  Social History     Socioeconomic History   • Marital status: /Civil Union     Spouse name: Not on file   • Number of children: Not on file   • Years of education: Not on file   • Highest education level: Not on file   Occupational History   • Not on file   Tobacco Use   • Smoking status: Never   • Smokeless tobacco: Never   Vaping Use   • Vaping Use: Never used   Substance and Sexual Activity   • Alcohol use: Not Currently   • Drug use: Yes     Types: Marijuana     Comment: Medical marijuana   • Sexual activity: Yes     Partners: Male     Birth control/protection: Surgical   Other Topics Concern   • Not on file   Social History Narrative    Rarely consumes alcohol - As per [employer]      Social Determinants of Health     Financial Resource Strain: Not on file   Food Insecurity: Food Insecurity Present (9/2/2022)    Hunger Vital Sign    • Worried About Running Out of Food in the Last Year: Sometimes true    • Ran Out of Food in the Last Year: Sometimes true   Transportation Needs: Unknown (9/2/2022)    PRAPARE - Transportation    • Lack of Transportation (Medical): Not on file    • Lack of Transportation (Non-Medical): No   Physical Activity: Not on file   Stress: Not on file   Social Connections: Not on file   Intimate Partner Violence: Not on file   Housing Stability: Low Risk  (9/2/2022)    Housing Stability Vital Sign    • Unable to Pay for Housing in the Last Year: No    • Number of Places Lived in the Last Year: 1    • Unstable Housing in the Last Year: No       ALLERGIES:  Allergies   Allergen Reactions   • Nsaids Other (See Comments)     Other reaction(s): Other (Please comment)  Should not take s/p bariatric surgery  Other reaction(s):  Other (See Comments)  Should not take as per prior records  Should not take s/p bariatric surgery  Has hx of gastric bypass     • Prednisone      Nausea, vomiting, diarrhea       ROS:  Constitutional:  No fever, chills, night sweats, loss of appetite   HEENT No no sore throat, difficulty swallowing   Cardiovascular:  No chest pain, palpitations, BLE edema, PARNELL   Respiratory:  No SOB, coughing, chest congestion   Gastrointestinal:  No nausea, vomiting, abdominal pain   Genitourinary:  No dysuria, hematuria, urinary urgency/frequency   Musculoskeletal:  See HPI   Skin:  No rash, erythema, edema   Neurologic:  See HPI   Psychiatric Illness:  No depression, anxiety, mood disorder, substance abuse disorder     PHYSICAL EXAM:  /65   Pulse 71   Ht 5' 2" (1.575 m)   Wt 52 kg (114 lb 10.2 oz)   LMP  (LMP Unknown)   BMI 20.97 kg/m²           General:  Well-developed,appears well, no acute distress   Respiratory:  No SOB, no abnormal effort (use of accessory muscles). GI / Abdominal:  Soft. No abdominal masses or tenderness. Nondistended. Gait & balance No evidence of myelopathic gait. Lumbar spine range of motion:  -Forward flexion to 50  -Extension to neutral  -Lateral bend 45 right, 45 left  -Rotation 45 right, 45 left  There is point tenderness over coccyx, and right paraspinal region    Neurologic:    Lower Extremity Motor Function    Right  Left    Iliopsoas  5/5  5/5    Quadriceps 5/5 5/5   Tibialis anterior  5/5  5/5    EHL  5/5  5/5    Gastroc. muscle  5/5  5/5    Heel rise  5/5  5/5    Toe rise  5/5  5/5      Sensory: light touch is intact to bilateral upper and lower extremities    Reflexes:    Right Left   Biceps 2+ 2+   Triceps 2+ 2+   Brachioradialis 2+ 2+   Patellar 1+ 1+   Achilles 1+ 1+   Babinski neg neg     Other tests:  Straight Leg Raise: Not tested  Perkins's: Not tested  Clonus: Not tested  Joann Cleaves SI: Not tested  ALEJO SI: Not tested  Greater troch:  No tenderness  Internal/external hip ROM: Not tested  Flexion/extension knee ROM: Not tested    IMAGING: I have personally reviewed the images and these are my findings:    X-rays of the sacrum and coccyx obtained on August 7, 2023 demonstrates a small unicortical chip fracture at the anterior aspect of the coccyx at the distal third. ASSESSMENT / Medical Decision Making (MDM):  43 y.o. female with history of coccyx fracture. No incontinence of bowel/bladder, no fever, no chills, no night sweats. Physical exam showing no focal neurologic deficits    Imaging reviewed as above.   Findings most notable for cortical anterior coccyx fracture    Plan: The above clinical, physical and imaging findings were reviewed with the patient. Cam Nielsen  has a constellation of findings consistent with pain associated with the coccyx fracture secondary to mechanical fall. Fortunately patient remains neurologically intact and functional.    I did discuss with her that there is no operative intervention for her fracture. Time a management of her pain is the recommended course of her symptoms currently. She may weight-bear as tolerated. Recommend use of the doughnut pillow while sitting to alleviate pressure at the coccyx. She may finish out her oxycodone. I did provide her with a prescription of tramadol. Use of ice and heat is also recommended. Lidocaine patches can also be beneficial.  I did note that can take up to 3 months to appreciate significant improvement with her symptoms. Cam Nielsen verbalized understanding and had no further questions. I will see her back on an as-needed basis. We discussed the treatment options including physical therapy, at home exercises, activity modifications, chiropractic medicine, oral medications, interventional spine procedures. At this time recommend continued conservative treatments.

## 2023-08-28 ENCOUNTER — TELEPHONE (OUTPATIENT)
Dept: NEPHROLOGY | Facility: CLINIC | Age: 38
End: 2023-08-28

## 2023-08-28 ENCOUNTER — APPOINTMENT (OUTPATIENT)
Dept: LAB | Facility: HOSPITAL | Age: 38
End: 2023-08-28
Payer: COMMERCIAL

## 2023-08-28 NOTE — TELEPHONE ENCOUNTER
Call from Peewee Francois at [facility]. Potassium today 3.3. Please advise on home infusion directions. 24 year old male with a history of anxiety, bipolar disorder, HPV presents with chest pain.  He was playing baseball, was up to bat and then suddenly developed chest pain with palpitations and tachycardia.  He describes and squeezing sensation to his left chest radiating to his right arm.  It is associated with SOB and feeling faint. No n/v/diaphoresis/syncope.  This occurred approximately 40 minutes PTA.  He states this chest pain has been occurring intermittently for 2 months but he has been reluctant to get worked up. It does not always occur with exertion.  Patient has gained 70 lbs in the last year due to binge-eating and inactivity.  He has strong family history of cardiac disease on his mother's side and had a full cardiac work up 3 years ago with Dr. Sherwood that was normal.  However, patient was running marathons at that time and in much better shape.  No PMD.

## 2023-08-30 ENCOUNTER — HOSPITAL ENCOUNTER (EMERGENCY)
Facility: HOSPITAL | Age: 38
Discharge: HOME | End: 2023-08-30
Attending: EMERGENCY MEDICINE | Admitting: EMERGENCY MEDICINE
Payer: COMMERCIAL

## 2023-08-30 VITALS
HEART RATE: 79 BPM | RESPIRATION RATE: 18 BRPM | OXYGEN SATURATION: 99 % | SYSTOLIC BLOOD PRESSURE: 113 MMHG | BODY MASS INDEX: 18.99 KG/M2 | WEIGHT: 103.84 LBS | TEMPERATURE: 98.3 F | DIASTOLIC BLOOD PRESSURE: 71 MMHG

## 2023-08-30 DIAGNOSIS — R11.0 NAUSEA: ICD-10-CM

## 2023-08-30 DIAGNOSIS — E87.6 HYPOKALEMIA: Primary | ICD-10-CM

## 2023-08-30 LAB
ALBUMIN SERPL BCP-MCNC: 4.7 G/DL (ref 3.5–5)
ALP SERPL-CCNC: 52 U/L (ref 34–104)
ALT SERPL W P-5'-P-CCNC: 15 U/L (ref 7–52)
ANION GAP SERPL CALCULATED.3IONS-SCNC: 10 MMOL/L
AST SERPL W P-5'-P-CCNC: 15 U/L (ref 13–39)
BASOPHILS # BLD AUTO: 0.08 THOUSANDS/ÂΜL (ref 0–0.1)
BASOPHILS NFR BLD AUTO: 1 % (ref 0–1)
BILIRUB SERPL-MCNC: 0.4 MG/DL (ref 0.2–1)
BUN SERPL-MCNC: 19 MG/DL (ref 5–25)
CALCIUM SERPL-MCNC: 9 MG/DL (ref 8.4–10.2)
CARDIAC TROPONIN I PNL SERPL HS: <2 NG/L
CHLORIDE SERPL-SCNC: 105 MMOL/L (ref 96–108)
CO2 SERPL-SCNC: 20 MMOL/L (ref 21–32)
CREAT SERPL-MCNC: 1.09 MG/DL (ref 0.6–1.3)
EOSINOPHIL # BLD AUTO: 0.09 THOUSAND/ÂΜL (ref 0–0.61)
EOSINOPHIL NFR BLD AUTO: 1 % (ref 0–6)
ERYTHROCYTE [DISTWIDTH] IN BLOOD BY AUTOMATED COUNT: 16.2 % (ref 11.6–15.1)
GFR SERPL CREATININE-BSD FRML MDRD: 64 ML/MIN/1.73SQ M
GLUCOSE SERPL-MCNC: 94 MG/DL (ref 65–140)
HCT VFR BLD AUTO: 37.1 % (ref 34.8–46.1)
HGB BLD-MCNC: 11.2 G/DL (ref 11.5–15.4)
IMM GRANULOCYTES # BLD AUTO: 0.03 THOUSAND/UL (ref 0–0.2)
IMM GRANULOCYTES NFR BLD AUTO: 0 % (ref 0–2)
LYMPHOCYTES # BLD AUTO: 2.6 THOUSANDS/ÂΜL (ref 0.6–4.47)
LYMPHOCYTES NFR BLD AUTO: 32 % (ref 14–44)
MAGNESIUM SERPL-MCNC: 2.4 MG/DL (ref 1.9–2.7)
MCH RBC QN AUTO: 22.6 PG (ref 26.8–34.3)
MCHC RBC AUTO-ENTMCNC: 30.2 G/DL (ref 31.4–37.4)
MCV RBC AUTO: 75 FL (ref 82–98)
MONOCYTES # BLD AUTO: 0.82 THOUSAND/ÂΜL (ref 0.17–1.22)
MONOCYTES NFR BLD AUTO: 10 % (ref 4–12)
NEUTROPHILS # BLD AUTO: 4.5 THOUSANDS/ÂΜL (ref 1.85–7.62)
NEUTS SEG NFR BLD AUTO: 56 % (ref 43–75)
NRBC BLD AUTO-RTO: 0 /100 WBCS
PLATELET # BLD AUTO: 476 THOUSANDS/UL (ref 149–390)
PMV BLD AUTO: 10 FL (ref 8.9–12.7)
POTASSIUM SERPL-SCNC: 3.2 MMOL/L (ref 3.5–5.3)
PROT SERPL-MCNC: 8.3 G/DL (ref 6.4–8.4)
RBC # BLD AUTO: 4.96 MILLION/UL (ref 3.81–5.12)
SODIUM SERPL-SCNC: 135 MMOL/L (ref 135–147)
WBC # BLD AUTO: 8.12 THOUSAND/UL (ref 4.31–10.16)

## 2023-08-30 PROCEDURE — 93005 ELECTROCARDIOGRAM TRACING: CPT

## 2023-08-30 PROCEDURE — 96374 THER/PROPH/DIAG INJ IV PUSH: CPT

## 2023-08-30 PROCEDURE — 85025 COMPLETE CBC W/AUTO DIFF WBC: CPT | Performed by: EMERGENCY MEDICINE

## 2023-08-30 PROCEDURE — 36415 COLL VENOUS BLD VENIPUNCTURE: CPT | Performed by: EMERGENCY MEDICINE

## 2023-08-30 PROCEDURE — 99285 EMERGENCY DEPT VISIT HI MDM: CPT | Performed by: EMERGENCY MEDICINE

## 2023-08-30 PROCEDURE — 84484 ASSAY OF TROPONIN QUANT: CPT | Performed by: EMERGENCY MEDICINE

## 2023-08-30 PROCEDURE — 99285 EMERGENCY DEPT VISIT HI MDM: CPT

## 2023-08-30 PROCEDURE — 80053 COMPREHEN METABOLIC PANEL: CPT | Performed by: EMERGENCY MEDICINE

## 2023-08-30 PROCEDURE — 83735 ASSAY OF MAGNESIUM: CPT | Performed by: EMERGENCY MEDICINE

## 2023-08-30 RX ORDER — ONDANSETRON 2 MG/ML
4 INJECTION INTRAMUSCULAR; INTRAVENOUS ONCE
Status: COMPLETED | OUTPATIENT
Start: 2023-08-30 | End: 2023-08-30

## 2023-08-30 RX ORDER — POTASSIUM CHLORIDE 20 MEQ/1
40 TABLET, EXTENDED RELEASE ORAL ONCE
Status: COMPLETED | OUTPATIENT
Start: 2023-08-30 | End: 2023-08-30

## 2023-08-30 RX ADMIN — POTASSIUM CHLORIDE 40 MEQ: 1500 TABLET, EXTENDED RELEASE ORAL at 16:22

## 2023-08-30 RX ADMIN — ONDANSETRON 4 MG: 2 INJECTION INTRAMUSCULAR; INTRAVENOUS at 15:42

## 2023-08-30 NOTE — ED PROVIDER NOTES
History  Chief Complaint   Patient presents with   • Chest Pain     Pt started with Chest heaviness today. Yesterday the pt started with left arm numbness and tingling and fatigue. Pt gets daily potassium infusions. Patient with history of recurrent hypokalemia, does home infusions through midline in right chest, states 3 days of generalized nausea feeling ill, numbness and tingling, chest discomfort, concerned that her potassium levels may be low despite doing an 80 mEq infusion last night and another 80 mEq infusion today. No abdominal pain. No fevers. No coughing. History provided by:  Patient   used: No    Medical Problem  Location:  Generalized  Quality:  Numbness/tingling, nausea, chest discomfort  Severity:  Moderate  Onset quality:  Gradual  Duration:  3 days  Timing:  Constant  Progression:  Unchanged  Chronicity:  New  Context:  Prior history of hypokalemia  Relieved by:  Nothing  Worsened by:  Nothing  Associated symptoms: nausea    Associated symptoms: no abdominal pain, no chest pain, no congestion, no cough, no diarrhea, no ear pain, no fever, no headaches, no loss of consciousness, no myalgias, no rash, no shortness of breath, no sore throat, no vomiting and no wheezing        Prior to Admission Medications   Prescriptions Last Dose Informant Patient Reported? Taking? Syringe/Needle, Disp, (SYRINGE 3CC/25GX5/8") 25G X 5/8" 3 ML MISC  Self No No   Sig: Use once monthly for B12 injection.    acetaminophen (TYLENOL) 325 mg tablet   Yes No   amitriptyline (ELAVIL) 10 mg tablet  Self No No   Sig: Take 2 tablets (20 mg total) by mouth daily at bedtime   cyanocobalamin 1,000 mcg/mL  Self No No   Sig: Inject 1 mL (1,000 mcg total) into a muscle every 30 (thirty) days Next dose due 5/1/2023   ergocalciferol (ERGOCALCIFEROL) 1.25 MG (57372 UT) capsule  Self No No   Sig: Take 1 capsule (50,000 Units total) by mouth once a week Mondays and Thursdays   escitalopram (LEXAPRO) 20 mg tablet  Self Yes No   Si mg daily at bedtime   ondansetron (ZOFRAN) 4 mg tablet  Self No No   Sig: Take 1 tablet (4 mg total) by mouth every 8 (eight) hours as needed for nausea or vomiting   ondansetron (ZOFRAN-ODT) 4 mg disintegrating tablet   Yes No   oxyCODONE (Roxicodone) 5 immediate release tablet   No No   Sig: Take 1 tablet (5 mg total) by mouth every 6 (six) hours as needed for moderate pain or severe pain Max Daily Amount: 20 mg   potassium chloride 0.4 mEq/mL  Self No No   Sig: Inject 200 mL (80 mEq total) into a catheter in a vein over 8 hours at 25 mL/hr daily 80 meq in 1 litre bag to be infused daily over 8 hours   Continue as ordered prior to admission   traMADol (Ultram) 50 mg tablet   No No   Sig: Take 1 tablet (50 mg total) by mouth every 6 (six) hours as needed for moderate pain   vitamin A 3 MG (52860 UT) capsule   Yes No   Sig: Take 1 capsule by mouth 2 (two) times a day   vitamin k 100 MCG tablet   Yes No   Sig: Take 1 tablet by mouth daily   zinc sulfate (ZINCATE) 220 mg capsule   Yes No   Sig: Take 1 capsule by mouth daily      Facility-Administered Medications: None       Past Medical History:   Diagnosis Date   • C. difficile colitis 2016   • COVID-19     2022- pt denies long covid   • DUB (dysfunctional uterine bleeding)    • H. pylori infection    • Hypertension    • Hypokalemia    • Kidney stone 2023   • Left lower quadrant abdominal pain 2020   • Migraine    • Pancreatitis    • Psychiatric disorder     Anxiety   • Rectal bleeding    • Renal disorder    • Rhabdomyolysis    • Thrombosed external hemorrhoid        Past Surgical History:   Procedure Laterality Date   • ABDOMINAL SURGERY     • APPENDECTOMY     •  SECTION     • CHOLECYSTECTOMY     • COSMETIC SURGERY      "tummy tuck"   • FL GUIDED CENTRAL VENOUS ACCESS DEVICE INSERTION  2018   • FL RETROGRADE PYELOGRAM  1/3/2023   • GASTRIC BYPASS     • HYSTERECTOMY     • LAPAROSCOPY     • CO ANRCT XM SURG REQ ANES GENERAL SPI/EDRL DX N/A 9/23/2021    Procedure: ANAL EXAM UNDER ANESTHESIA; HEMORRHOIDECTOMY;  Surgeon: Jelena Baca DO;  Location:  MAIN OR;  Service: General   • PA CYSTO/URETERO W/LITHOTRIPSY &INDWELL STENT INSRT Left 1/3/2023    Procedure: CYSTOSCOPY URETEROSCOPY WITH RETROGRADE PYELOGRAM, BASKET STONE EXTRACTION AND INSERTION STENT URETERAL;  Surgeon: Raine Pulido MD;  Location:  MAIN OR;  Service: Urology   • PA EXC THROMBOSED HEMORRHOID [facility] N/A 7/8/2022    Procedure: EXCISION OF THROMBOSED EXTERNAL HEMORRHOID, Excision of perianal skin tag, dranage of perianal abscess, anal fistulatomy;  Surgeon: Jelena Baca DO;  Location:  MAIN OR;  Service: General   • TUNNELED VENOUS PORT PLACEMENT N/A 12/21/2018    Procedure: INSERTION VENOUS PORT (PORT-A-CATH); Surgeon: Sylvia Jones DO;  Location: [facility];  Service: General       Family History   Problem Relation Age of Onset   • No Known Problems Mother    • Hypertension Father    • Diabetes Father    • Urolithiasis Father    • Prostate cancer Father    • Diabetes Maternal Grandfather      I have reviewed and agree with the history as documented. E-Cigarette/Vaping   • E-Cigarette Use Never User      E-Cigarette/Vaping Substances   • Nicotine No    • THC No    • CBD No    • Flavoring No    • Other No    • Unknown No      Social History     Tobacco Use   • Smoking status: Never   • Smokeless tobacco: Never   Vaping Use   • Vaping Use: Never used   Substance Use Topics   • Alcohol use: Not Currently   • Drug use: Yes     Types: Marijuana     Comment: Medical marijuana       Review of Systems   Constitutional: Negative for chills and fever. HENT: Negative for congestion, ear pain, hearing loss, sore throat, trouble swallowing and voice change. Eyes: Negative for pain and discharge. Respiratory: Negative for cough, shortness of breath and wheezing. Cardiovascular: Negative for chest pain and palpitations. Gastrointestinal: Positive for nausea. Negative for abdominal pain, blood in stool, constipation, diarrhea and vomiting. Genitourinary: Negative for dysuria, flank pain, frequency and hematuria. Musculoskeletal: Negative for joint swelling, myalgias, neck pain and neck stiffness. Skin: Negative for rash and wound. Neurological: Negative for dizziness, seizures, loss of consciousness, syncope, facial asymmetry and headaches. Psychiatric/Behavioral: Negative for hallucinations, self-injury and suicidal ideas. All other systems reviewed and are negative. Physical Exam  Physical Exam  Vitals and nursing note reviewed. Constitutional:       General: She is not in acute distress. Appearance: She is well-developed. HENT:      Head: Normocephalic and atraumatic. Right Ear: External ear normal.      Left Ear: External ear normal.   Eyes:      General: No scleral icterus. Right eye: No discharge. Left eye: No discharge. Extraocular Movements: Extraocular movements intact. Conjunctiva/sclera: Conjunctivae normal.   Cardiovascular:      Rate and Rhythm: Normal rate and regular rhythm. Heart sounds: Normal heart sounds. No murmur heard. Pulmonary:      Effort: Pulmonary effort is normal.      Breath sounds: Normal breath sounds. No wheezing or rales. Abdominal:      General: Bowel sounds are normal. There is no distension. Palpations: Abdomen is soft. Tenderness: There is no abdominal tenderness. There is no guarding or rebound. Musculoskeletal:         General: No deformity. Normal range of motion. Cervical back: Normal range of motion and neck supple. Skin:     General: Skin is warm and dry. Findings: No rash. Neurological:      General: No focal deficit present. Mental Status: She is alert and oriented to person, place, and time. Cranial Nerves: No cranial nerve deficit.    Psychiatric:         Mood and Affect: Mood normal. Behavior: Behavior normal.         Thought Content:  Thought content normal.         Judgment: Judgment normal.         Vital Signs  ED Triage Vitals [08/30/23 1522]   Temperature Pulse Respirations Blood Pressure SpO2   98.3 °F (36.8 °C) 81 16 114/86 100 %      Temp src Heart Rate Source Patient Position - Orthostatic VS BP Location FiO2 (%)   -- Monitor Sitting Right arm --      Pain Score       --           Vitals:    08/30/23 1522 08/30/23 1530   BP: 114/86 114/86   Pulse: 81 88   Patient Position - Orthostatic VS: Sitting          Visual Acuity      ED Medications  Medications   potassium chloride (K-DUR,KLOR-CON) CR tablet 40 mEq (has no administration in time range)   ondansetron (ZOFRAN) injection 4 mg (4 mg Intravenous Given 8/30/23 1542)       Diagnostic Studies  Results Reviewed     Procedure Component Value Units Date/Time    HS Troponin 0hr (reflex protocol) [[id number]]  (Normal) Collected: 08/30/23 1538    Lab Status: Final result Specimen: Blood from Line Updated: 08/30/23 1607     hs TnI 0hr <2 ng/L     HS Troponin I 2hr [[id number]]     Lab Status: No result Specimen: Blood     HS Troponin I 4hr [[id number]]     Lab Status: No result Specimen: Blood     Comprehensive metabolic panel [[id number]]  (Abnormal) Collected: 08/30/23 1538    Lab Status: Final result Specimen: Blood from Line Updated: 08/30/23 1604     Sodium 135 mmol/L      Potassium 3.2 mmol/L      Chloride 105 mmol/L      CO2 20 mmol/L      ANION GAP 10 mmol/L      BUN 19 mg/dL      Creatinine 1.09 mg/dL      Glucose 94 mg/dL      Calcium 9.0 mg/dL      AST 15 U/L      ALT 15 U/L      Alkaline Phosphatase 52 U/L      Total Protein 8.3 g/dL      Albumin 4.7 g/dL      Total Bilirubin 0.40 mg/dL      eGFR 64 ml/min/1.73sq m     Narrative:      [place] guidelines for Chronic Kidney Disease (CKD):   •  Stage 1 with normal or high GFR (GFR > 90 mL/min/1.73 square meters)  •  Stage 2 Mild CKD (GFR = 60-89 mL/min/1.73 square meters)  •  Stage 3A Moderate CKD (GFR = 45-59 mL/min/1.73 square meters)  •  Stage 3B Moderate CKD (GFR = 30-44 mL/min/1.73 square meters)  •  Stage 4 Severe CKD (GFR = 15-29 mL/min/1.73 square meters)  •  Stage 5 End Stage CKD (GFR <15 mL/min/1.73 square meters)  Note: GFR calculation is accurate only with a steady state creatinine    Magnesium [[id number]]  (Normal) Collected: 08/30/23 1538    Lab Status: Final result Specimen: Blood from Line Updated: 08/30/23 1604     Magnesium 2.4 mg/dL     CBC and differential [[id number]]  (Abnormal) Collected: 08/30/23 1538    Lab Status: Final result Specimen: Blood from Line Updated: 08/30/23 1542     WBC 8.12 Thousand/uL      RBC 4.96 Million/uL      Hemoglobin 11.2 g/dL      Hematocrit 37.1 %      MCV 75 fL      MCH 22.6 pg      MCHC 30.2 g/dL      RDW 16.2 %      MPV 10.0 fL      Platelets 645 Thousands/uL      nRBC 0 /100 WBCs      Neutrophils Relative 56 %      Immat GRANS % 0 %      Lymphocytes Relative 32 %      Monocytes Relative 10 %      Eosinophils Relative 1 %      Basophils Relative 1 %      Neutrophils Absolute 4.50 Thousands/µL      Immature Grans Absolute 0.03 Thousand/uL      Lymphocytes Absolute 2.60 Thousands/µL      Monocytes Absolute 0.82 Thousand/µL      Eosinophils Absolute 0.09 Thousand/µL      Basophils Absolute 0.08 Thousands/µL                  No orders to display              Procedures  ECG 12 Lead Documentation Only    Date/Time: 8/30/2023 3:26 PM    Performed by: Eliane Campos MD  Authorized by: Eliane Campos MD    ECG reviewed by me, the ED Provider: yes    Patient location:  ED  Previous ECG:     Previous ECG:  Unavailable  Interpretation:     Interpretation: normal    Rate:     ECG rate:  88    ECG rate assessment: normal    Rhythm:     Rhythm: sinus rhythm    Ectopy:     Ectopy: none    QRS:     QRS axis:  Normal    QRS intervals:  Normal  Conduction:     Conduction: normal    ST segments:     ST segments:  Normal  T waves: T waves: normal               ED Course                                             Medical Decision Making  Based on the history and medical screening exam performed the diagnostic considerations include but are not limited to hypokalemia, hypomagnesemia, other electrolyte abnormality, dehydration, viral illness, gastroenteritis. Based on the work-up performed in the emergency room which includes physical examination, and which may include laboratory studies and imaging as warranted including advanced imaging such as CT scan or ultrasound, the differential diagnosis is narrowed to exclude limb or life-threatening process. The patient is stable for discharge. Potassium 3.2, magnesium normal, examination benign, EKG benign. Stable for discharge    Amount and/or Complexity of Data Reviewed  Labs: ordered. Decision-making details documented in ED Course. Details: Potassium 3.2, magnesium normal  ECG/medicine tests: ordered and independent interpretation performed. Decision-making details documented in ED Course. Details: Normal sinus rhythm rate 88      Risk  Prescription drug management. Disposition  Final diagnoses:   Hypokalemia   Nausea     Time reflects when diagnosis was documented in both MDM as applicable and the Disposition within this note     Time User Action Codes Description Comment    8/30/2023  4:14 PM Renee Lyles Add [E87.6] Hypokalemia     8/30/2023  4:15 PM Renee Lyles Add [R11.0] Nausea       ED Disposition     ED Disposition   Discharge    Condition   Stable    Date/Time   Wed Aug 30, 2023  4:14 PM    Comment   Bijan Kapoor discharge to home/self care.                Follow-up Information     Follow up With Specialties Details Why [address] [place], [place] [address]  [phone]            Patient's Medications   Discharge Prescriptions    No medications on file       No discharge procedures on file.     PDMP Review       Value Time User    PDMP Reviewed  Yes 8/11/2023  2:08 PM Jose C Tan MD          ED Provider  Electronically Signed by           Ck Newberry MD  08/30/23 0225

## 2023-08-31 LAB
ATRIAL RATE: 88 BPM
P AXIS: 51 DEGREES
PR INTERVAL: 98 MS
QRS AXIS: 56 DEGREES
QRSD INTERVAL: 94 MS
QT INTERVAL: 360 MS
QTC INTERVAL: 435 MS
T WAVE AXIS: 43 DEGREES
VENTRICULAR RATE: 88 BPM

## 2023-08-31 PROCEDURE — 93010 ELECTROCARDIOGRAM REPORT: CPT | Performed by: INTERNAL MEDICINE

## 2023-09-05 ENCOUNTER — APPOINTMENT (OUTPATIENT)
Dept: LAB | Facility: HOSPITAL | Age: 38
End: 2023-09-05
Payer: COMMERCIAL

## 2023-09-25 ENCOUNTER — APPOINTMENT (OUTPATIENT)
Dept: LAB | Facility: HOSPITAL | Age: 38
End: 2023-09-25
Payer: COMMERCIAL

## 2023-10-23 ENCOUNTER — APPOINTMENT (OUTPATIENT)
Dept: LAB | Facility: HOSPITAL | Age: 38
End: 2023-10-23
Payer: COMMERCIAL

## 2023-10-30 ENCOUNTER — APPOINTMENT (OUTPATIENT)
Dept: LAB | Facility: HOSPITAL | Age: 38
End: 2023-10-30
Payer: COMMERCIAL

## 2023-11-06 ENCOUNTER — PATIENT MESSAGE (OUTPATIENT)
Dept: NEPHROLOGY | Facility: CLINIC | Age: 38
End: 2023-11-06

## 2023-11-06 ENCOUNTER — APPOINTMENT (OUTPATIENT)
Dept: LAB | Facility: HOSPITAL | Age: 38
End: 2023-11-06
Payer: COMMERCIAL

## 2023-11-06 NOTE — DISCHARGE SUMMARY
Discharge- Tere Vee 1985, 28 y o  female MRN: [id number]    Unit/Bed#: 690-73 Encounter: [id number]    Primary Care Provider: Chante Hanson MD   Date and time admitted to hospital: 9/21/2018  5:02 PM        * Hypokalemia   Assessment & Plan    The patient presented to the emergency department with reports of feeling fatigued with paresthesias in extremities and in face  In the ED the patients potassium was noted to be 2 0  EKG showed sinus rhythm with premature atrial complexes at a rate of 74 bpm  The patient was admitted to med/surg floor and monitored on telemetry  The patient has been having multiple episodes of low potassium levels despite being on daily potassium supplement  She was started on IV potassium supplementation and PO potassium supplementation  Nephrology was consulted and started the patient on spironolactone and midodrine per nephrology  The patient's potassium improved without additional supplementation  On the day of discharge the patient was noted to have a potassium of 3 4  She had improvement of her paresthesias  She was cleared by nephrology for discharge home  She was instructed to follow up with Nephrology  She was continued on spironolactone and midodrine on discharge  She was instructed to continue her potassium supplementation  She was given a script to repeat a BMP in 3 days  She was also instructed to follow up with her PCP within 1 week  Vitamin D deficiency   Assessment & Plan    -Continue vitamin D3        Migraine without aura and without status migrainosus, not intractable   Assessment & Plan    - No reports of current headaches  - Continue home medications        History of gastric bypass   Assessment & Plan    -Reports being followed by weight management   -Continue multivitamins  - Likely contributing to malabsorption of potassium                Discharging Physician / Practitioner: Yasmeen Moscoso PA-C  PCP: Chante Hanson MD  Admission Date:   Admission Monitor summary:        Rhythm:[place]  Rate:   Ectopy: (r)PVC  Measurements: 0./.07/.0        12hr chart check                      Orders     Ordered        09/21/18 1844  Inpatient Admission (expected length of stay for this patient is greater than two midnights)  Once             Discharge Date: 09/24/18    Resolved Problems  Date Reviewed: 9/24/2018          Resolved    Hypocalcemia 9/23/2018     Resolved by  Renee Sainz PA-C        Consultations During Hospital Stay:  · nephrology    Procedures Performed:     · No results found  Significant Findings / Test Results:     · Potassium 2 0    Incidental Findings:   · none     Test Results Pending at Discharge (will require follow up):   · none     Outpatient Tests Requested:  · BMP: outpatient follow up with Nephrology in the office to discuss results of lab work    Complications:  none    Reason for Admission: hypokalemia, paresthesias    Hospital Course: Shala Sheets is a 28 y o  female patient who originally presented to the hospital on 9/21/2018 due to complaints of numbness and tingling to extremities and extreme fatigue  She reports a numbness and tingling occurring intermittently over the past 2-3 weeks  She has been having multiple episodes of low potassium and was admitted back in August for similar episode  She was called by her PCP who is in [place] and was told to come to the ER because her potassium is at 2 4  She does recall she had onset of the numbness and tingling and extreme fatigue prior to being told that her potassium was low  She denies chest pain, shortness of breath, abdominal pain, fever, chills,  Vomiting, diarrhea  Please see above list of diagnoses and related plan for additional information       Condition at Discharge: good     Discharge Day Visit / Exam:     Subjective:    Vitals: Blood Pressure: 111/53 (09/24/18 0748)  Pulse: 74 (09/24/18 0748)  Temperature: 98 9 °F (37 2 °C) (09/24/18 0748)  Temp Source: Temporal (09/24/18 0748)  Respirations: 18 (09/24/18 0748)  Height: 5' 2" (157 5 cm) (09/21/18 1940)  Weight - Scale: 70 kg (154 lb 5 2 oz) (09/24/18 5922)  SpO2: 100 % (09/24/18 8610)  Exam:   Physical Exam   Constitutional: She is oriented to person, place, and time  She appears well-developed and well-nourished  HENT:   Head: Normocephalic and atraumatic  Cardiovascular: Normal rate, regular rhythm and normal heart sounds  Pulmonary/Chest: Effort normal and breath sounds normal  No respiratory distress  She has no wheezes  She has no rales  Abdominal: Soft  Bowel sounds are normal  She exhibits no distension  There is no tenderness  Neurological: She is alert and oriented to person, place, and time  No cranial nerve deficit  Skin: Skin is warm and dry  Nursing note and vitals reviewed  Discussion with Family: n/a    Discharge instructions/Information to patient and family:   See after visit summary for information provided to patient and family  Provisions for Follow-Up Care:  See after visit summary for information related to follow-up care and any pertinent home health orders  Disposition:     Home    For Discharges to [facility] SNF:   · Not Applicable to this Patient - Not Applicable to this Patient    Planned Readmission: no     Discharge Statement:  I spent 25 minutes discharging the patient  This time was spent on the day of discharge  I had direct contact with the patient on the day of discharge  Greater than 50% of the total time was spent examining patient, answering all patient questions, arranging and discussing plan of care with patient as well as directly providing post-discharge instructions  Additional time then spent on discharge activities  Discharge Medications:  See after visit summary for reconciled discharge medications provided to patient and family        ** Please Note: This note has been constructed using a voice recognition system **

## 2023-11-08 DIAGNOSIS — E87.6 HYPOKALEMIA: Primary | ICD-10-CM

## 2023-11-09 ENCOUNTER — APPOINTMENT (OUTPATIENT)
Dept: LAB | Facility: HOSPITAL | Age: 38
DRG: 425 | End: 2023-11-09
Payer: COMMERCIAL

## 2023-11-09 ENCOUNTER — HOSPITAL ENCOUNTER (INPATIENT)
Facility: HOSPITAL | Age: 38
LOS: 1 days | Discharge: HOME | DRG: 425 | End: 2023-11-12
Attending: STUDENT IN AN ORGANIZED HEALTH CARE EDUCATION/TRAINING PROGRAM | Admitting: HOSPITALIST
Payer: COMMERCIAL

## 2023-11-09 DIAGNOSIS — E87.6 HYPOKALEMIA: ICD-10-CM

## 2023-11-09 DIAGNOSIS — E87.6 HYPOKALEMIA: Primary | ICD-10-CM

## 2023-11-09 LAB
ALBUMIN SERPL BCP-MCNC: 4.6 G/DL (ref 3.5–5)
ALP SERPL-CCNC: 43 U/L (ref 34–104)
ALT SERPL W P-5'-P-CCNC: 13 U/L (ref 7–52)
ANION GAP SERPL CALCULATED.3IONS-SCNC: 10 MMOL/L
ANION GAP SERPL CALCULATED.3IONS-SCNC: 10 MMOL/L
AST SERPL W P-5'-P-CCNC: 18 U/L (ref 13–39)
BASOPHILS # BLD AUTO: 0.07 THOUSANDS/ÂΜL (ref 0–0.1)
BASOPHILS NFR BLD AUTO: 1 % (ref 0–1)
BILIRUB SERPL-MCNC: 0.28 MG/DL (ref 0.2–1)
BUN SERPL-MCNC: 19 MG/DL (ref 5–25)
BUN SERPL-MCNC: 19 MG/DL (ref 5–25)
CALCIUM SERPL-MCNC: 8.6 MG/DL (ref 8.4–10.2)
CALCIUM SERPL-MCNC: 8.9 MG/DL (ref 8.4–10.2)
CHLORIDE SERPL-SCNC: 102 MMOL/L (ref 96–108)
CHLORIDE SERPL-SCNC: 102 MMOL/L (ref 96–108)
CO2 SERPL-SCNC: 22 MMOL/L (ref 21–32)
CO2 SERPL-SCNC: 24 MMOL/L (ref 21–32)
CREAT SERPL-MCNC: 0.98 MG/DL (ref 0.6–1.3)
CREAT SERPL-MCNC: 0.99 MG/DL (ref 0.6–1.3)
EOSINOPHIL # BLD AUTO: 0.02 THOUSAND/ÂΜL (ref 0–0.61)
EOSINOPHIL NFR BLD AUTO: 0 % (ref 0–6)
ERYTHROCYTE [DISTWIDTH] IN BLOOD BY AUTOMATED COUNT: 17.2 % (ref 11.6–15.1)
GFR SERPL CREATININE-BSD FRML MDRD: 72 ML/MIN/1.73SQ M
GFR SERPL CREATININE-BSD FRML MDRD: 72 ML/MIN/1.73SQ M
GLUCOSE SERPL-MCNC: 93 MG/DL (ref 65–140)
GLUCOSE SERPL-MCNC: 98 MG/DL (ref 65–140)
HCT VFR BLD AUTO: 32 % (ref 34.8–46.1)
HGB BLD-MCNC: 9.5 G/DL (ref 11.5–15.4)
IMM GRANULOCYTES # BLD AUTO: 0.02 THOUSAND/UL (ref 0–0.2)
IMM GRANULOCYTES NFR BLD AUTO: 0 % (ref 0–2)
LYMPHOCYTES # BLD AUTO: 2.83 THOUSANDS/ÂΜL (ref 0.6–4.47)
LYMPHOCYTES NFR BLD AUTO: 32 % (ref 14–44)
MAGNESIUM SERPL-MCNC: 2.5 MG/DL (ref 1.9–2.7)
MCH RBC QN AUTO: 20.1 PG (ref 26.8–34.3)
MCHC RBC AUTO-ENTMCNC: 29.7 G/DL (ref 31.4–37.4)
MCV RBC AUTO: 68 FL (ref 82–98)
MONOCYTES # BLD AUTO: 0.6 THOUSAND/ÂΜL (ref 0.17–1.22)
MONOCYTES NFR BLD AUTO: 7 % (ref 4–12)
NEUTROPHILS # BLD AUTO: 5.45 THOUSANDS/ÂΜL (ref 1.85–7.62)
NEUTS SEG NFR BLD AUTO: 60 % (ref 43–75)
NRBC BLD AUTO-RTO: 0 /100 WBCS
PLATELET # BLD AUTO: 416 THOUSANDS/UL (ref 149–390)
PMV BLD AUTO: 9.4 FL (ref 8.9–12.7)
POTASSIUM SERPL-SCNC: 2.8 MMOL/L (ref 3.5–5.3)
POTASSIUM SERPL-SCNC: 2.9 MMOL/L (ref 3.5–5.3)
PROT SERPL-MCNC: 7.8 G/DL (ref 6.4–8.4)
RBC # BLD AUTO: 4.73 MILLION/UL (ref 3.81–5.12)
SODIUM SERPL-SCNC: 134 MMOL/L (ref 135–147)
SODIUM SERPL-SCNC: 136 MMOL/L (ref 135–147)
WBC # BLD AUTO: 8.99 THOUSAND/UL (ref 4.31–10.16)

## 2023-11-09 PROCEDURE — 84132 ASSAY OF SERUM POTASSIUM: CPT | Performed by: NURSE PRACTITIONER

## 2023-11-09 PROCEDURE — 99285 EMERGENCY DEPT VISIT HI MDM: CPT | Performed by: STUDENT IN AN ORGANIZED HEALTH CARE EDUCATION/TRAINING PROGRAM

## 2023-11-09 PROCEDURE — 36415 COLL VENOUS BLD VENIPUNCTURE: CPT

## 2023-11-09 PROCEDURE — 83540 ASSAY OF IRON: CPT | Performed by: NURSE PRACTITIONER

## 2023-11-09 PROCEDURE — 93005 ELECTROCARDIOGRAM TRACING: CPT

## 2023-11-09 PROCEDURE — 85025 COMPLETE CBC W/AUTO DIFF WBC: CPT | Performed by: PHYSICIAN ASSISTANT

## 2023-11-09 PROCEDURE — 83735 ASSAY OF MAGNESIUM: CPT | Performed by: PHYSICIAN ASSISTANT

## 2023-11-09 PROCEDURE — 80048 BASIC METABOLIC PNL TOTAL CA: CPT

## 2023-11-09 PROCEDURE — 99223 1ST HOSP IP/OBS HIGH 75: CPT | Performed by: NURSE PRACTITIONER

## 2023-11-09 PROCEDURE — 80053 COMPREHEN METABOLIC PANEL: CPT | Performed by: PHYSICIAN ASSISTANT

## 2023-11-09 PROCEDURE — 99285 EMERGENCY DEPT VISIT HI MDM: CPT

## 2023-11-09 PROCEDURE — 83550 IRON BINDING TEST: CPT | Performed by: NURSE PRACTITIONER

## 2023-11-09 PROCEDURE — 96365 THER/PROPH/DIAG IV INF INIT: CPT

## 2023-11-09 PROCEDURE — 82728 ASSAY OF FERRITIN: CPT | Performed by: NURSE PRACTITIONER

## 2023-11-09 RX ORDER — ACETAMINOPHEN 325 MG/1
650 TABLET ORAL EVERY 6 HOURS PRN
Status: DISCONTINUED | OUTPATIENT
Start: 2023-11-09 | End: 2023-11-12 | Stop reason: HOSPADM

## 2023-11-09 RX ORDER — POTASSIUM CHLORIDE 14.9 MG/ML
20 INJECTION INTRAVENOUS ONCE
Status: DISCONTINUED | OUTPATIENT
Start: 2023-11-09 | End: 2023-11-09

## 2023-11-09 RX ORDER — POTASSIUM CHLORIDE 20 MEQ/1
40 TABLET, EXTENDED RELEASE ORAL ONCE
Status: CANCELLED | OUTPATIENT
Start: 2023-11-09 | End: 2023-11-09

## 2023-11-09 RX ORDER — POTASSIUM CHLORIDE 14.9 MG/ML
20 INJECTION INTRAVENOUS
Status: COMPLETED | OUTPATIENT
Start: 2023-11-09 | End: 2023-11-09

## 2023-11-09 RX ORDER — ONDANSETRON 2 MG/ML
4 INJECTION INTRAMUSCULAR; INTRAVENOUS EVERY 8 HOURS PRN
Status: DISCONTINUED | OUTPATIENT
Start: 2023-11-09 | End: 2023-11-12 | Stop reason: HOSPADM

## 2023-11-09 RX ORDER — TRAMADOL HYDROCHLORIDE 50 MG/1
50 TABLET ORAL EVERY 6 HOURS PRN
Status: DISCONTINUED | OUTPATIENT
Start: 2023-11-09 | End: 2023-11-12 | Stop reason: HOSPADM

## 2023-11-09 RX ORDER — AMITRIPTYLINE HYDROCHLORIDE 10 MG/1
20 TABLET, FILM COATED ORAL
Status: DISCONTINUED | OUTPATIENT
Start: 2023-11-09 | End: 2023-11-12 | Stop reason: HOSPADM

## 2023-11-09 RX ORDER — ESCITALOPRAM OXALATE 10 MG/1
20 TABLET ORAL
Status: DISCONTINUED | OUTPATIENT
Start: 2023-11-09 | End: 2023-11-12 | Stop reason: HOSPADM

## 2023-11-09 RX ADMIN — POTASSIUM CHLORIDE 20 MEQ: 14.9 INJECTION, SOLUTION INTRAVENOUS at 20:38

## 2023-11-09 RX ADMIN — ESCITALOPRAM OXALATE 20 MG: 10 TABLET ORAL at 21:26

## 2023-11-09 RX ADMIN — POTASSIUM CHLORIDE 20 MEQ: 14.9 INJECTION, SOLUTION INTRAVENOUS at 18:38

## 2023-11-09 RX ADMIN — AMITRIPTYLINE HYDROCHLORIDE 20 MG: 10 TABLET, FILM COATED ORAL at 21:26

## 2023-11-09 RX ADMIN — POTASSIUM CHLORIDE 20 MEQ: 14.9 INJECTION, SOLUTION INTRAVENOUS at 16:53

## 2023-11-09 NOTE — ED PROVIDER NOTES
History  Chief Complaint   Patient presents with    Evaluation of Abnormal Diagnostic Test     Pt received a call from her nephrologist today stating her potassium was 2.8. Pt complaining of chest heaviness and feels "like her body is vibrating"     Is a 34-year-old female who presents due to abnormal lab work. Patient has been feeling "achy" despite getting potassium infusions. Dr. Aiden Viveros with nephrology recommended with her potassium dropping she should be seen in the ED and have repeat labs performed. Evaluation of Abnormal Diagnostic Test  Time since result:  A few hours  Patient referred by:  Specialist  Resulting agency:  [facility]  Result type: chemistry    Chemistry:     Potassium:  Low      Prior to Admission Medications   Prescriptions Last Dose Informant Patient Reported? Taking? Syringe/Needle, Disp, (SYRINGE 3CC/25GX5/8") 25G X 5/8" 3 ML MISC 2023 Self No Yes   Sig: Use once monthly for B12 injection.    acetaminophen (TYLENOL) 325 mg tablet Not Taking  Yes No   Patient not taking: Reported on 2023   amitriptyline (ELAVIL) 10 mg tablet 2023 Self No Yes   Sig: Take 2 tablets (20 mg total) by mouth daily at bedtime   cyanocobalamin 1,000 mcg/mL Past Month Self No Yes   Sig: Inject 1 mL (1,000 mcg total) into a muscle every 30 (thirty) days Next dose due 2023   ergocalciferol (ERGOCALCIFEROL) 1.25 MG (80112 UT) capsule Past Week Self No Yes   Sig: Take 1 capsule (50,000 Units total) by mouth once a week  and    escitalopram (LEXAPRO) 20 mg tablet 2023 Self Yes Yes   Si mg daily at bedtime   ondansetron (ZOFRAN) 4 mg tablet 2023 Self No Yes   Sig: Take 1 tablet (4 mg total) by mouth every 8 (eight) hours as needed for nausea or vomiting   ondansetron (ZOFRAN-ODT) 4 mg disintegrating tablet 2023  Yes Yes   oxyCODONE (Roxicodone) 5 immediate release tablet Not Taking  No No   Sig: Take 1 tablet (5 mg total) by mouth every 6 (six) hours as needed for moderate pain or severe pain Max Daily Amount: 20 mg   Patient not taking: Reported on 2023   potassium chloride 0.4 mEq/mL 2023 Self No Yes   Sig: Inject 200 mL (80 mEq total) into a catheter in a vein over 8 hours at 25 mL/hr daily 80 meq in 1 litre bag to be infused daily over 8 hours   Continue as ordered prior to admission   traMADol (Ultram) 50 mg tablet 2023  No Yes   Sig: Take 1 tablet (50 mg total) by mouth every 6 (six) hours as needed for moderate pain   vitamin A 3 MG (65483 UT) capsule 2023  Yes Yes   Sig: Take 1 capsule by mouth 2 (two) times a day   vitamin k 100 MCG tablet 2023  Yes Yes   Sig: Take 1 tablet by mouth daily      Facility-Administered Medications: None       Past Medical History:   Diagnosis Date    C. difficile colitis     COVID-19     2022- pt denies long covid    DUB (dysfunctional uterine bleeding)     H. pylori infection     Hypertension     Hypokalemia     Kidney stone 2023    Left lower quadrant abdominal pain 2020    Migraine     Pancreatitis     Psychiatric disorder     Anxiety    Rectal bleeding     Renal disorder     Rhabdomyolysis     Thrombosed external hemorrhoid        Past Surgical History:   Procedure Laterality Date    ABDOMINAL SURGERY      APPENDECTOMY       SECTION      CHOLECYSTECTOMY      COSMETIC SURGERY      "tummy tuck"    FL GUIDED CENTRAL VENOUS ACCESS DEVICE INSERTION  2018    FL RETROGRADE PYELOGRAM  1/3/2023    GASTRIC BYPASS      HYSTERECTOMY      LAPAROSCOPY      NE ANRCT XM SURG REQ ANES GENERAL SPI/EDRL DX N/A 2021    Procedure: ANAL EXAM UNDER ANESTHESIA; HEMORRHOIDECTOMY;  Surgeon: Kyle Ferris DO;  Location:  MAIN OR;  Service: General    NE CYSTO/URETERO W/LITHOTRIPSY &INDWELL STENT INSRT Left 1/3/2023    Procedure: CYSTOSCOPY URETEROSCOPY WITH RETROGRADE PYELOGRAM, BASKET STONE EXTRACTION AND INSERTION STENT URETERAL;  Surgeon: Ayaz Casanova MD; Location: [facility];  Service: Urology    WA EXC THROMBOSED HEMORRHOID [facility] N/A 7/8/2022    Procedure: EXCISION OF THROMBOSED EXTERNAL HEMORRHOID, Excision of perianal skin tag, dranage of perianal abscess, anal fistulatomy;  Surgeon: Tivis Hammans, DO;  Location:  MAIN OR;  Service: General    TUNNELED VENOUS PORT PLACEMENT N/A 12/21/2018    Procedure: INSERTION VENOUS PORT (PORT-A-CATH); Surgeon: Sanam Dickinson DO;  Location: [facility];  Service: General       Family History   Problem Relation Age of Onset    No Known Problems Mother     Hypertension Father     Diabetes Father     Urolithiasis Father     Prostate cancer Father     Diabetes Maternal Grandfather      I have reviewed and agree with the history as documented. E-Cigarette/Vaping    E-Cigarette Use Never User      E-Cigarette/Vaping Substances    Nicotine No     THC No     CBD No     Flavoring No     Other No     Unknown No      Social History     Tobacco Use    Smoking status: Never    Smokeless tobacco: Never   Vaping Use    Vaping Use: Never used   Substance Use Topics    Alcohol use: Not Currently    Drug use: Yes     Types: Marijuana     Comment: Medical marijuana       Review of Systems   Constitutional:  Negative for chills and fever. HENT:  Negative for ear pain and sore throat. Eyes:  Negative for pain and visual disturbance. Respiratory:  Negative for cough, shortness of breath and wheezing. Cardiovascular:  Negative for chest pain, palpitations and leg swelling. Gastrointestinal:  Negative for abdominal pain and vomiting. Genitourinary:  Negative for dysuria and hematuria. Musculoskeletal:  Negative for arthralgias and back pain. Skin:  Negative for color change and rash. Neurological:  Negative for dizziness, seizures and syncope. All other systems reviewed and are negative. Physical Exam  Physical Exam  Vitals and nursing note reviewed.    Constitutional:       General: She is not in acute distress. Appearance: She is well-developed. HENT:      Head: Normocephalic and atraumatic. Eyes:      Extraocular Movements: Extraocular movements intact. Conjunctiva/sclera: Conjunctivae normal.      Pupils: Pupils are equal, round, and reactive to light. Cardiovascular:      Rate and Rhythm: Normal rate and regular rhythm. Heart sounds: No murmur heard. Pulmonary:      Effort: Pulmonary effort is normal. No respiratory distress. Breath sounds: Normal breath sounds. Abdominal:      Palpations: Abdomen is soft. Tenderness: There is no abdominal tenderness. There is no right CVA tenderness or left CVA tenderness. Musculoskeletal:         General: No swelling. Cervical back: Normal range of motion and neck supple. Skin:     General: Skin is warm and dry. Capillary Refill: Capillary refill takes less than 2 seconds. Neurological:      General: No focal deficit present. Mental Status: She is alert and oriented to person, place, and time.    Psychiatric:         Mood and Affect: Mood normal.         Vital Signs  ED Triage Vitals   Temperature Pulse Respirations Blood Pressure SpO2   11/09/23 1535 11/09/23 1535 11/09/23 1535 11/09/23 1535 11/09/23 1535   98.4 °F (36.9 °C) 81 16 134/81 100 %      Temp Source Heart Rate Source Patient Position - Orthostatic VS BP Location FiO2 (%)   11/09/23 1535 11/09/23 1535 11/09/23 1535 11/09/23 1535 --   Temporal Monitor Sitting Right arm       Pain Score       11/09/23 1700       No Pain           Vitals:    11/09/23 1600 11/09/23 1645 11/09/23 1700 11/09/23 1807   BP: 123/67 116/68 120/71 122/57   Pulse: 72 68 69 62   Patient Position - Orthostatic VS: Lying            Visual Acuity  Visual Acuity      Flowsheet Row Most Recent Value   L Pupil Size (mm) 3   R Pupil Size (mm) 3            ED Medications  Medications   potassium chloride 20 mEq IVPB (premix) (20 mEq Intravenous New Bag 11/9/23 1838)   amitriptyline (ELAVIL) tablet 20 mg (has no administration in time range)   escitalopram (LEXAPRO) tablet 20 mg (has no administration in time range)   traMADol (ULTRAM) tablet 50 mg (has no administration in time range)   ondansetron (ZOFRAN) injection 4 mg (has no administration in time range)   acetaminophen (TYLENOL) tablet 650 mg (has no administration in time range)       Diagnostic Studies  Results Reviewed       Procedure Component Value Units Date/Time    Comprehensive metabolic panel [[id number]]  (Abnormal) Collected: 11/09/23 1541    Lab Status: Final result Specimen: Blood from Central Venous Line Updated: 11/09/23 1605     Sodium 134 mmol/L      Potassium 2.9 mmol/L      Chloride 102 mmol/L      CO2 22 mmol/L      ANION GAP 10 mmol/L      BUN 19 mg/dL      Creatinine 0.99 mg/dL      Glucose 93 mg/dL      Calcium 8.6 mg/dL      AST 18 U/L      ALT 13 U/L      Alkaline Phosphatase 43 U/L      Total Protein 7.8 g/dL      Albumin 4.6 g/dL      Total Bilirubin 0.28 mg/dL      eGFR 72 ml/min/1.73sq m     Narrative:      [place] guidelines for Chronic Kidney Disease (CKD):     Stage 1 with normal or high GFR (GFR > 90 mL/min/1.73 square meters)    Stage 2 Mild CKD (GFR = 60-89 mL/min/1.73 square meters)    Stage 3A Moderate CKD (GFR = 45-59 mL/min/1.73 square meters)    Stage 3B Moderate CKD (GFR = 30-44 mL/min/1.73 square meters)    Stage 4 Severe CKD (GFR = 15-29 mL/min/1.73 square meters)    Stage 5 End Stage CKD (GFR <15 mL/min/1.73 square meters)  Note: GFR calculation is accurate only with a steady state creatinine    Magnesium [[id number]]  (Normal) Collected: 11/09/23 1541    Lab Status: Final result Specimen: Blood from Central Venous Line Updated: 11/09/23 1605     Magnesium 2.5 mg/dL     CBC and differential [[id number]]  (Abnormal) Collected: 11/09/23 1541    Lab Status: Final result Specimen: Blood from Central Venous Line Updated: 11/09/23 1545     WBC 8.99 Thousand/uL      RBC 4.73 Million/uL Hemoglobin 9.5 g/dL      Hematocrit 32.0 %      MCV 68 fL      MCH 20.1 pg      MCHC 29.7 g/dL      RDW 17.2 %      MPV 9.4 fL      Platelets 675 Thousands/uL      nRBC 0 /100 WBCs      Neutrophils Relative 60 %      Immat GRANS % 0 %      Lymphocytes Relative 32 %      Monocytes Relative 7 %      Eosinophils Relative 0 %      Basophils Relative 1 %      Neutrophils Absolute 5.45 Thousands/µL      Immature Grans Absolute 0.02 Thousand/uL      Lymphocytes Absolute 2.83 Thousands/µL      Monocytes Absolute 0.60 Thousand/µL      Eosinophils Absolute 0.02 Thousand/µL      Basophils Absolute 0.07 Thousands/µL                    No orders to display              Procedures  ECG 12 Lead Documentation Only    Date/Time: 11/9/2023 3:50 PM    Performed by: Pernell Trammell PA-C  Authorized by: Pernell Trammell PA-C    Indications / Diagnosis:  Hypokalemia  ECG reviewed by me, the ED Provider: yes    Patient location:  ED  Previous ECG:     Previous ECG:  Unavailable  Interpretation:     Interpretation: non-specific    Rate:     ECG rate:  85    ECG rate assessment: normal    Rhythm:     Rhythm: sinus rhythm    ST segments:     ST segments:  Non-specific  T waves:     T waves: non-specific             ED Course  ED Course as of 11/09/23 2037   Thu Nov 09, 2023   1625 Potassium(!): 2.9   1635 Potassium(!): 2.9                               SBIRT 20yo+      Flowsheet Row Most Recent Value   Initial Alcohol Screen: US AUDIT-C     1. How often do you have a drink containing alcohol? 0 Filed at: 11/09/2023 1545   2. How many drinks containing alcohol do you have on a typical day you are drinking? 0 Filed at: 11/09/2023 1545   3b. FEMALE Any Age, or MALE 65+: How often do you have 4 or more drinks on one occassion? 0 Filed at: 11/09/2023 1545   Audit-C Score 0 Filed at: 11/09/2023 1545   SLIME: How many times in the past year have you. .. Used an illegal drug or used a prescription medication for non-medical reasons?  Never Filed at: 11/09/2023 1545                      Medical Decision Making  Is a 76-year-old female who presents due to abnormal lab work. Patient has been feeling "achy" despite getting potassium infusions. Dr. Sen Staff with nephrology recommended with her potassium dropping she should be seen in the ED and have repeat labs performed. The patient's lab work demonstrated hypokalemia. EKG did not demonstrate any acute findings. Patient was not having chest pain she was overall exhausted and did not express any chest pain Emergency Department. Patient was given IV potassium, nephrology indicated that p.o. potassium does not usually help her. Patient at this point has attempted outpatient measures of increasing her potassium herself and is very exhausted. Patient feels uncomfortable going home and would prefer observation with IV potassium and monitoring. This was discussed with the hospitalist service for observation and excepted under Dr. Koroma    Amount and/or Complexity of Data Reviewed  Labs: ordered. Decision-making details documented in ED Course. ECG/medicine tests: ordered. Decision-making details documented in ED Course. Discussion of management or test interpretation with external provider(s): Dr. Norm Amato and Dr. Koroma     Risk  Prescription drug management. Decision regarding hospitalization. Disposition  Final diagnoses:   Hypokalemia     Time reflects when diagnosis was documented in both MDM as applicable and the Disposition within this note       Time User Action Codes Description Comment    11/9/2023  4:40 PM Eloise Dunn Add [E87.6] Hypokalemia           ED Disposition       ED Disposition   Admit    Condition   Stable    Date/Time   Thu Nov 9, 2023 3066    Comment   Case was discussed with [facility]  and the patient's admission status was agreed to be Admission Status: observation status to the service of Dr. Sulaiman Frazier .                Follow-up Information    None Current Discharge Medication List        CONTINUE these medications which have NOT CHANGED    Details   amitriptyline (ELAVIL) 10 mg tablet Take 2 tablets (20 mg total) by mouth daily at bedtime  Qty: 60 tablet, Refills: 0    Associated Diagnoses: Migraine      cyanocobalamin 1,000 mcg/mL Inject 1 mL (1,000 mcg total) into a muscle every 30 (thirty) days Next dose due 5/1/2023    Associated Diagnoses: B12 deficiency      ergocalciferol (ERGOCALCIFEROL) 1.25 MG (14559 UT) capsule Take 1 capsule (50,000 Units total) by mouth once a week Mondays and Thursdays    Associated Diagnoses: Vitamin D insufficiency      escitalopram (LEXAPRO) 20 mg tablet 20 mg daily at bedtime      ondansetron (ZOFRAN) 4 mg tablet Take 1 tablet (4 mg total) by mouth every 8 (eight) hours as needed for nausea or vomiting  Qty: 20 tablet, Refills: 0    Associated Diagnoses: Intractable vomiting      ondansetron (ZOFRAN-ODT) 4 mg disintegrating tablet       potassium chloride 0.4 mEq/mL Inject 200 mL (80 mEq total) into a catheter in a vein over 8 hours at 25 mL/hr daily 80 meq in 1 litre bag to be infused daily over 8 hours   Continue as ordered prior to admission  Qty: 2800 mL, Refills: 0    Associated Diagnoses: Hypokalemia      Syringe/Needle, Disp, (SYRINGE 3CC/25GX5/8") 25G X 5/8" 3 ML MISC Use once monthly for B12 injection.   Qty: 1 each, Refills: 11    Associated Diagnoses: B12 deficiency      traMADol (Ultram) 50 mg tablet Take 1 tablet (50 mg total) by mouth every 6 (six) hours as needed for moderate pain  Qty: 30 tablet, Refills: 0    Associated Diagnoses: Fracture of coccyx, initial encounter for closed fracture (HCC)      vitamin A 3 MG (15250 UT) capsule Take 1 capsule by mouth 2 (two) times a day      vitamin k 100 MCG tablet Take 1 tablet by mouth daily      acetaminophen (TYLENOL) 325 mg tablet       oxyCODONE (Roxicodone) 5 immediate release tablet Take 1 tablet (5 mg total) by mouth every 6 (six) hours as needed for moderate pain or severe pain Max Daily Amount: 20 mg  Qty: 10 tablet, Refills: 0    Associated Diagnoses: Fracture of coccyx, initial encounter for closed fracture ([address])             No discharge procedures on file.     PDMP Review         Value Time User    PDMP Reviewed  Yes 8/11/2023  2:08 Hortencia Shafer MD            ED Provider  Electronically Signed by             Jose Raul Rodrigues PA-C  11/09/23 2037

## 2023-11-10 LAB
ANION GAP SERPL CALCULATED.3IONS-SCNC: 8 MMOL/L
ANION GAP SERPL CALCULATED.3IONS-SCNC: 8 MMOL/L
BUN SERPL-MCNC: 20 MG/DL (ref 5–25)
BUN SERPL-MCNC: 20 MG/DL (ref 5–25)
CALCIUM SERPL-MCNC: 8.5 MG/DL (ref 8.4–10.2)
CALCIUM SERPL-MCNC: 8.6 MG/DL (ref 8.4–10.2)
CHLORIDE SERPL-SCNC: 106 MMOL/L (ref 96–108)
CHLORIDE SERPL-SCNC: 109 MMOL/L (ref 96–108)
CO2 SERPL-SCNC: 19 MMOL/L (ref 21–32)
CO2 SERPL-SCNC: 24 MMOL/L (ref 21–32)
CREAT SERPL-MCNC: 0.97 MG/DL (ref 0.6–1.3)
CREAT SERPL-MCNC: 0.98 MG/DL (ref 0.6–1.3)
ERYTHROCYTE [DISTWIDTH] IN BLOOD BY AUTOMATED COUNT: 17.2 % (ref 11.6–15.1)
FERRITIN SERPL-MCNC: 3 NG/ML (ref 11–307)
GFR SERPL CREATININE-BSD FRML MDRD: 72 ML/MIN/1.73SQ M
GFR SERPL CREATININE-BSD FRML MDRD: 73 ML/MIN/1.73SQ M
GLUCOSE SERPL-MCNC: 84 MG/DL (ref 65–140)
GLUCOSE SERPL-MCNC: 92 MG/DL (ref 65–140)
HCT VFR BLD AUTO: 29.3 % (ref 34.8–46.1)
HGB BLD-MCNC: 8.7 G/DL (ref 11.5–15.4)
IRON SATN MFR SERPL: 2 % (ref 15–50)
IRON SERPL-MCNC: 11 UG/DL (ref 50–212)
MAGNESIUM SERPL-MCNC: 2.4 MG/DL (ref 1.9–2.7)
MCH RBC QN AUTO: 20.1 PG (ref 26.8–34.3)
MCHC RBC AUTO-ENTMCNC: 29.7 G/DL (ref 31.4–37.4)
MCV RBC AUTO: 68 FL (ref 82–98)
PHOSPHATE SERPL-MCNC: 3.8 MG/DL (ref 2.7–4.5)
PLATELET # BLD AUTO: 348 THOUSANDS/UL (ref 149–390)
PMV BLD AUTO: 9.3 FL (ref 8.9–12.7)
POTASSIUM SERPL-SCNC: 3 MMOL/L (ref 3.5–5.3)
POTASSIUM SERPL-SCNC: 3.1 MMOL/L (ref 3.5–5.3)
POTASSIUM SERPL-SCNC: 4 MMOL/L (ref 3.5–5.3)
RBC # BLD AUTO: 4.32 MILLION/UL (ref 3.81–5.12)
SODIUM SERPL-SCNC: 136 MMOL/L (ref 135–147)
SODIUM SERPL-SCNC: 138 MMOL/L (ref 135–147)
TIBC SERPL-MCNC: 701 UG/DL (ref 250–450)
UIBC SERPL-MCNC: 690 UG/DL (ref 155–355)
WBC # BLD AUTO: 6.2 THOUSAND/UL (ref 4.31–10.16)

## 2023-11-10 PROCEDURE — 80048 BASIC METABOLIC PNL TOTAL CA: CPT | Performed by: HOSPITALIST

## 2023-11-10 PROCEDURE — 99245 OFF/OP CONSLTJ NEW/EST HI 55: CPT | Performed by: INTERNAL MEDICINE

## 2023-11-10 PROCEDURE — 84100 ASSAY OF PHOSPHORUS: CPT | Performed by: NURSE PRACTITIONER

## 2023-11-10 PROCEDURE — 99232 SBSQ HOSP IP/OBS MODERATE 35: CPT | Performed by: HOSPITALIST

## 2023-11-10 PROCEDURE — 85027 COMPLETE CBC AUTOMATED: CPT | Performed by: NURSE PRACTITIONER

## 2023-11-10 PROCEDURE — 83735 ASSAY OF MAGNESIUM: CPT | Performed by: NURSE PRACTITIONER

## 2023-11-10 PROCEDURE — 80048 BASIC METABOLIC PNL TOTAL CA: CPT | Performed by: NURSE PRACTITIONER

## 2023-11-10 RX ORDER — POTASSIUM CHLORIDE 14.9 MG/ML
20 INJECTION INTRAVENOUS
Status: COMPLETED | OUTPATIENT
Start: 2023-11-10 | End: 2023-11-10

## 2023-11-10 RX ADMIN — ESCITALOPRAM OXALATE 20 MG: 10 TABLET ORAL at 21:15

## 2023-11-10 RX ADMIN — POTASSIUM CHLORIDE 20 MEQ: 14.9 INJECTION, SOLUTION INTRAVENOUS at 14:51

## 2023-11-10 RX ADMIN — POTASSIUM CHLORIDE 20 MEQ: 14.9 INJECTION, SOLUTION INTRAVENOUS at 11:55

## 2023-11-10 RX ADMIN — AMITRIPTYLINE HYDROCHLORIDE 20 MG: 10 TABLET, FILM COATED ORAL at 21:15

## 2023-11-10 RX ADMIN — POTASSIUM CHLORIDE 20 MEQ: 14.9 INJECTION, SOLUTION INTRAVENOUS at 10:47

## 2023-11-10 RX ADMIN — POTASSIUM CHLORIDE 20 MEQ: 14.9 INJECTION, SOLUTION INTRAVENOUS at 13:13

## 2023-11-10 NOTE — PLAN OF CARE
Problem: PAIN - ADULT  Goal: Verbalizes/displays adequate comfort level or baseline comfort level  Description: Interventions:  - Encourage patient to monitor pain and request assistance  - Assess pain using appropriate pain scale  - Administer analgesics based on type and severity of pain and evaluate response  - Implement non-pharmacological measures as appropriate and evaluate response  - Consider cultural and social influences on pain and pain management  - Notify physician/advanced practitioner if interventions unsuccessful or patient reports new pain  Outcome: Progressing     Problem: INFECTION - ADULT  Goal: Absence or prevention of progression during hospitalization  Description: INTERVENTIONS:  - Assess and monitor for signs and symptoms of infection  - Monitor lab/diagnostic results  - Monitor all insertion sites, i.e. indwelling lines, tubes, and drains  - Monitor endotracheal if appropriate and nasal secretions for changes in amount and color  - [place] appropriate cooling/warming therapies per order  - Administer medications as ordered  - Instruct and encourage patient and family to use good hand hygiene technique  - Identify and instruct in appropriate isolation precautions for identified infection/condition  Outcome: Progressing  Goal: Absence of fever/infection during neutropenic period  Description: INTERVENTIONS:  - Monitor WBC    Outcome: Progressing     Problem: SAFETY ADULT  Goal: Patient will remain free of falls  Description: INTERVENTIONS:  - Educate patient/family on patient safety including physical limitations  - Instruct patient to call for assistance with activity   - Consult OT/PT to assist with strengthening/mobility   - Keep Call bell within reach  - Keep bed low and locked with side rails adjusted as appropriate  - Keep care items and personal belongings within reach  - Initiate and maintain comfort rounds  - Make Fall Risk Sign visible to staff  -  - Apply yellow socks and bracelet for high fall risk patients  - Consider moving patient to room near nurses station  Outcome: Progressing  Goal: Maintain or return to baseline ADL function  Description: INTERVENTIONS:  -  Assess patient's ability to carry out ADLs; assess patient's baseline for ADL function and identify physical deficits which impact ability to perform ADLs (bathing, care of mouth/teeth, toileting, grooming, dressing, etc.)  - Assess/evaluate cause of self-care deficits   - Assess range of motion  - Assess patient's mobility; develop plan if impaired  - Assess patient's need for assistive devices and provide as appropriate  - Encourage maximum independence but intervene and supervise when necessary  - Involve family in performance of ADLs  - Assess for home care needs following discharge   - Consider OT consult to assist with ADL evaluation and planning for discharge  - Provide patient education as appropriate  Outcome: Progressing  Goal: Maintains/Returns to pre admission functional level  Description: INTERVENTIONS:  - Perform BMAT or MOVE assessment daily.   - Set and communicate daily mobility goal to care team and patient/family/caregiver.      - Out of bed for toileting  - Record patient progress and toleration of activity level   Outcome: Progressing     Problem: DISCHARGE PLANNING  Goal: Discharge to home or other facility with appropriate resources  Description: INTERVENTIONS:  - Identify barriers to discharge w/patient and caregiver  - Arrange for needed discharge resources and transportation as appropriate  - Identify discharge learning needs (meds, wound care, etc.)  - Arrange for interpretive services to assist at discharge as needed  - Refer to Case Management Department for coordinating discharge planning if the patient needs post-hospital services based on physician/advanced practitioner order or complex needs related to functional status, cognitive ability, or social support system  Outcome: Progressing Problem: Knowledge Deficit  Goal: Patient/family/caregiver demonstrates understanding of disease process, treatment plan, medications, and discharge instructions  Description: Complete learning assessment and assess knowledge base.   Interventions:  - Provide teaching at level of understanding  - Provide teaching via preferred learning methods  Outcome: Progressing     Problem: METABOLIC, FLUID AND ELECTROLYTES - ADULT  Goal: Electrolytes maintained within normal limits  Description: INTERVENTIONS:  - Monitor labs and assess patient for signs and symptoms of electrolyte imbalances  - Administer electrolyte replacement as ordered  - Monitor response to electrolyte replacements, including repeat lab results as appropriate  - Instruct patient on fluid and nutrition as appropriate  Outcome: Progressing  Goal: Fluid balance maintained  Description: INTERVENTIONS:  - Monitor labs   - Monitor I/O and WT  - Instruct patient on fluid and nutrition as appropriate  - Assess for signs & symptoms of volume excess or deficit  Outcome: Progressing  Goal: Glucose maintained within target range  Description: INTERVENTIONS:  - Monitor Blood Glucose as ordered  - Assess for signs and symptoms of hyperglycemia and hypoglycemia  - Administer ordered medications to maintain glucose within target range  - Assess nutritional intake and initiate nutrition service referral as needed  Outcome: Progressing

## 2023-11-10 NOTE — PLAN OF CARE
Problem: SAFETY ADULT  Goal: Maintain or return to baseline ADL function  Description: INTERVENTIONS:  -  Assess patient's ability to carry out ADLs; assess patient's baseline for ADL function and identify physical deficits which impact ability to perform ADLs (bathing, care of mouth/teeth, toileting, grooming, dressing, etc.)  - Assess/evaluate cause of self-care deficits   - Assess range of motion  - Assess patient's mobility; develop plan if impaired  - Assess patient's need for assistive devices and provide as appropriate  - Encourage maximum independence but intervene and supervise when necessary  - Involve family in performance of ADLs  - Assess for home care needs following discharge   - Consider OT consult to assist with ADL evaluation and planning for discharge  - Provide patient education as appropriate  11/9/2023 1918 by Ruben Fajardo RN  Outcome: Progressing  11/9/2023 1823 by Ruben Fajardo RN  Outcome: Progressing     Problem: DISCHARGE PLANNING  Goal: Discharge to home or other facility with appropriate resources  Description: INTERVENTIONS:  - Identify barriers to discharge w/patient and caregiver  - Arrange for needed discharge resources and transportation as appropriate  - Identify discharge learning needs (meds, wound care, etc.)  - Arrange for interpretive services to assist at discharge as needed  - Refer to Case Management Department for coordinating discharge planning if the patient needs post-hospital services based on physician/advanced practitioner order or complex needs related to functional status, cognitive ability, or social support system  11/9/2023 1918 by Ruben Fajardo RN  Outcome: Progressing  11/9/2023 1823 by Ruben Fajardo RN  Outcome: Progressing     Problem: Knowledge Deficit  Goal: Patient/family/caregiver demonstrates understanding of disease process, treatment plan, medications, and discharge instructions  Description: Complete learning assessment and assess knowledge base.  Interventions:  - Provide teaching at level of understanding  - Provide teaching via preferred learning methods  11/9/2023 1918 by Francine Cortés RN  Outcome: Progressing  11/9/2023 1823 by Frnacine Cortés RN  Outcome: Progressing

## 2023-11-10 NOTE — NURSING NOTE
Patient admitted with accessed port-a-cath right chest. Patient receives daily potassium IV infusions at home via port-a-cath due to chronic hypokalemia. Patient requesting to not be de-accessed and re-accessed as patient gets weekly needle changes at home by home health nursing. Per patient, last access date was Tuesday 11/7/23. Transparent dressing in place with silver antimicrobial pad encircling insertion site. Site looks clean, dry and intact without evidence of infection or irritation. Monitoring continues.

## 2023-11-10 NOTE — NURSING NOTE
Assumed patient care at 2300. Patient alert and oriented, answers questions appropriately. Assessment benign, patient offers no complaints at present. Port accessed to right chest, positive blood return, flushes without difficulty. IV potassium completed as ordered. Labs obtained as ordered. Patient resting in bed, call bell in reach.

## 2023-11-10 NOTE — ASSESSMENT & PLAN NOTE
Chronic anemia  History of vitamin B12 deficiency  Microcytic indices, check iron panel --- pending  Hgb 9.5 on admission, 8.7 today, monitor

## 2023-11-10 NOTE — ED ATTENDING ATTESTATION
11/9/2023  I, Castro Haq DO, saw and evaluated the patient. I have discussed the patient with the resident/non-physician practitioner and agree with the resident's/non-physician practitioner's findings, Plan of Care, and MDM as documented in the resident's/non-physician practitioner's note, except where noted. All available labs and Radiology studies were reviewed. I was present for key portions of any procedure(s) performed by the resident/non-physician practitioner and I was immediately available to provide assistance. At this point I agree with the current assessment done in the Emergency Department. I have conducted an independent evaluation of this patient a history and physical is as follows:    44 yo F. Hx of chronic hypokalemia secondary to GI losses. Follows with Nephrology. Presents with acute on chronic hypo-K. Having associated myalgias. Normal magnesium. K replaced. Admitted to [facility]. See Ms. Thrasher's note for further details.      ED Course         Critical Care Time  Procedures

## 2023-11-10 NOTE — UTILIZATION REVIEW
Initial Clinical Review    Observation 11/9 @ 1737 and changed to Inpatient on 11/11 @ 1206. Pt requiring continued stay for Symptomatic Hypokalemia, persistent nausea, replete, trend, workup and treat. Admission: Date/Time/Statement:   Admission Orders (From admission, onward)       Ordered        11/11/23 1206  Inpatient Admission  Once            11/09/23 1737  Place in Observation  Once                          Orders Placed This Encounter   Procedures    Inpatient Admission     Standing Status:   Standing     Number of Occurrences:   1     Order Specific Question:   Level of Care     Answer:   Med Surg [16]     Order Specific Question:   Estimated length of stay     Answer:   More than 2 Midnights     Order Specific Question:   Certification     Answer:   I certify that inpatient services are medically necessary for this patient for a duration of greater than two midnights. See H&P and MD Progress Notes for additional information about the patient's course of treatment. ED Arrival Information       Expected   -    Arrival   11/9/2023 15:28    Acuity   Urgent              Means of arrival   Walk-In    Escorted by   Self    Service   Hospitalist    Admission type   Emergency              Arrival complaint   chest heaviness/tingling in hands             Chief Complaint   Patient presents with    Evaluation of Abnormal Diagnostic Test     Pt received a call from her nephrologist today stating her potassium was 2.8. Pt complaining of chest heaviness and feels "like her body is vibrating"       Initial Presentation: 43 y.o. female to ED presents for potassium of 2.8 on outpt labs. Reports myalgias similar to previous hypokalemia episodes. EKG  nonspecific ST changes on admit. PMH for Gastric bypass with chronic hypokalemia secondary to excessive gastrointestinal losses, states improved since bypass revision in June 2023 and increased tolerance of oral nutrition.   GERD, C. difficile, pancreatitis, anxiety, WAGNER previously on CPAP no longer using. Has right chest Port-A-Cath and infuses potassium daily, followed by Nephrology. Chronic anemia. Vit B deficiency. Admit Observation level of care for Hypokalemia. Symptomatic with myalgias. Continue IV potassium replacement with Q 8-hour potassium labs. Magnesium 2.5. Nephrology consult. Hgb 9.5, Today with microcytic indices, check iron panel. EKG - NSR    11/10  Progress notes; K 3.1, Continue Iv K replacement-- another 80meq today and follow up BMP Hgb 8.7 today, monitor. Microcytic indices, check iron panel --- pending    Nephrology cons; Hypokalemia. initially started on repletion. Pt on significant repletion at home chronically 80 mEq a day and more recently over the past 5 days had increased to 160 mEq a day and subsequently to 240 mEq a day for 2 days but was sent to the hospital due to continued hypokalemia . K 3.1 today. Check K this evening and replete prn.     11/11 Changed to Inpatient status. Progress notes; K improved to 4.0, then fell 3.1. Iron is low. Pt on iron supplement. Will avoid IV Venofer at this time due to GI symptoms     Per Nephrology; Acute on chronic hypokalemia. Given 80 mill equivalents IV potassium today and then BMP will be checked. Defer IV iron at this time due to risk of oral side effects but should be considered as an outpatient. Pt appetite still poor and still has some nausea. Able to eat and drink a little bit today. Date: 11/12 Day 2:  Per Nephrology; Serum potassium improved 3.8 mmol/L and symptoms resolved. Oral intake is improved. Will give 20 mEq IV potassium now and then she will restart home 80 mEq IV potassium in evening as typically scheduled. Severe iron deficiency - Cannot tolerate oral iron due to decreased absorption and risk for GI side effects which will worsen potassium. Avoided IV iron in hospital given GI symptoms. Recommend IV iron as an outpt. Can be discharge to home.        ED Triage Vitals Temperature Pulse Respirations Blood Pressure SpO2   11/09/23 1535 11/09/23 1535 11/09/23 1535 11/09/23 1535 11/09/23 1535   98.4 °F (36.9 °C) 81 16 134/81 100 %      Temp Source Heart Rate Source Patient Position - Orthostatic VS BP Location FiO2 (%)   11/09/23 1535 11/09/23 1535 11/09/23 1535 11/09/23 1535 --   Temporal Monitor Sitting Right arm       Pain Score       11/09/23 1700       No Pain          Wt Readings from Last 1 Encounters:   11/09/23 50.7 kg (111 lb 12.4 oz)     Additional Vital Signs:   11/12/23 08:15:12 97.7 °F (36.5 °C) 113 Abnormal  18 119/68 85 97 % None (Room air)     11/11/23 15:21:08 97.7 °F (36.5 °C) 96 16 118/76 90 94 % -- --   11/11/23 08:39:32 97.5 °F (36.4 °C) 92 -- 120/80 93 98 % -- --   11/11/23 0832 -- -- -- -- -- 99 % None (Room air) --     11/10/23 07:52:56 97.7 °F (36.5 °C) 75 -- 111/61 78 100 % -- --   11/10/23 07:39:56 97.9 °F (36.6 °C) -- 18 119/62 81 -- -- --   11/09/23 21:39:37 97.7 °F (36.5 °C) 73 18 116/54 75 97 % -- --   11/09/23 18:07:33 98.2 °F (36.8 °C) 62 -- 122/57 79 100 % -- --   11/09/23 1700 -- 69 44 Abnormal  120/71 91 100 % -- --   11/09/23 1645 -- 68 20 116/68 86 100 % -- --   11/09/23 1600 -- 72 18 123/67 90 100 % None (Room air)      Pertinent Labs/Diagnostic Test Results:   No orders to display         Results from last 7 days   Lab Units 11/11/23  0444 11/10/23  0515 11/09/23  1541   WBC Thousand/uL 9.14 6.20 8.99   HEMOGLOBIN g/dL 9.4* 8.7* 9.5*   HEMATOCRIT % 32.8* 29.3* 32.0*   PLATELETS Thousands/uL 389 348 416*   NEUTROS ABS Thousands/µL  --   --  5.45         Results from last 7 days   Lab Units 11/10/23  0515 11/09/23  2344 11/09/23  1541 11/09/23  0713   SODIUM mmol/L 138  --  134* 136   POTASSIUM mmol/L 3.1* 3.0* 2.9* 2.8*   CHLORIDE mmol/L 106  --  102 102   CO2 mmol/L 24  --  22 24   ANION GAP mmol/L 8  --  10 10   BUN mg/dL 20  --  19 19   CREATININE mg/dL 0.98  --  0.99 0.98   EGFR ml/min/1.73sq m 72  --  72 72   CALCIUM mg/dL 8.5  --  8.6 8. 9   MAGNESIUM mg/dL 2.4  --  2.5  --    PHOSPHORUS mg/dL 3.8  --   --   --      Results from last 7 days   Lab Units 11/09/23  1541   AST U/L 18   ALT U/L 13   ALK PHOS U/L 43   TOTAL PROTEIN g/dL 7.8   ALBUMIN g/dL 4.6   TOTAL BILIRUBIN mg/dL 0.28         Results from last 7 days   Lab Units 11/10/23  0515 11/09/23  1541 11/09/23  0713   GLUCOSE RANDOM mg/dL 84 93 98             BETA-HYDROXYBUTYRATE   Date Value Ref Range Status   09/02/2022 0.4 <0.6 mmol/L Final      ED Treatment:   Medication Administration from 11/09/2023 1526 to 11/09/2023 1802         Date/Time Order Dose Route Action     11/09/2023 1653 EST potassium chloride 20 mEq IVPB (premix) 20 mEq Intravenous New Bag          Past Medical History:   Diagnosis Date    C. difficile colitis 2016    COVID-19     1/2022- pt denies long covid    DUB (dysfunctional uterine bleeding)     H. pylori infection     Hypertension     Hypokalemia     Kidney stone November 2023    Left lower quadrant abdominal pain 08/17/2020    Migraine     Pancreatitis     Psychiatric disorder     Anxiety    Rectal bleeding     Renal disorder     Rhabdomyolysis     Thrombosed external hemorrhoid      Present on Admission:   Hypokalemia   Chronic anemia      Admitting Diagnosis: Hypokalemia [E87.6]  Abnormal finding of diagnostic imaging [R93.89]  Age/Sex: 43 y.o. female    Admission Orders:  Scheduled Medications:  amitriptyline, 20 mg, Oral, HS  escitalopram, 20 mg, Oral, HS    potassium chloride 20 mEq IVPB (premix)  Dose: 20 mEq  Freq: Every 2 hours Route: IV x2 given  Last Dose: Stopped (11/11/23 1620)  Start: 11/11/23 1230 End: 11/11/23 1620     potassium chloride 20 mEq IVPB (premix)  Dose: 20 mEq  Freq: Every 2 hours Route: IV x4 given  Last Dose: 20 mEq (11/10/23 1451)  Start: 11/10/23 0845 End: 11/10/23 1651     potassium chloride 20 mEq IVPB (premix)  Dose: 20 mEq  Freq: Every 2 hours Route: IV x1 given  Last Dose: 20 mEq (11/12/23 0851)  Start: 11/12/23 0830 End: 11/12/23 1051     Continuous IV Infusions: None     PRN Meds:  acetaminophen, 650 mg, Oral, Q6H PRN  ondansetron, 4 mg, Intravenous, Q8H PRN  traMADol, 50 mg, Oral, Q6H PRN        IP CONSULT TO NEPHROLOGY    Network Utilization Review Department  ATTENTION: Please call with any questions or concerns to [phone] and carefully listen to the prompts so that you are directed to the right person. All voicemails are confidential.   For Discharge needs, contact Care Management DC Support Team at [phone] opt. 2  Send all requests for admission clinical reviews, approved or denied determinations and any other requests to dedicated fax number below belonging to the campus where the patient is receiving treatment.  List of dedicated fax numbers for the Facilities:  [place] DENIALS (Administrative/Medical Necessity) [phone]   DISCHARGE SUPPORT TEAM (NETWORK) [id number] [address] [facility] (Maternity/NICU/Pediatrics) [phone]   [address] [facility] [phone]   1500 [facility] 207 [facility] [address] [place] [address] [address] [address] [facility] [address] 1300 [facility] [id number] [place] [address] [phone]

## 2023-11-10 NOTE — ASSESSMENT & PLAN NOTE
Chronic anemia  History of vitamin B12 deficiency  Today with microcytic indices, check iron panel  Hgb 9.5 on admission

## 2023-11-10 NOTE — ASSESSMENT & PLAN NOTE
Acute on chronic hypokalemia secondary to excessive gastrointestinal loss, s/p gastric bypass surgery with previous frequent hospitalizations but states has improved since revision Jejuno-jejunal anastomosis at [facility] in June 2023.   Maintained on home potassium infusions managed by [facility] Blas's nephrology via right chest Port-A-Cath but outpatient labs today show potassium of 2.8 patient was instructed to present for hospital admission   Symptomatic with myalgias  Continue IV potassium replacement with Q 8-hour potassium labs  Magnesium 2.5  Appreciate nephrology consultation

## 2023-11-10 NOTE — ASSESSMENT & PLAN NOTE
Chief Complaint   Patient presents with   • Gynecologic Exam       Francoise Kamara is a 54 y.o. year old  presenting to be seen for her annual exam.This patient has previously had a lap band procedure; removal of the lap band and a gastric sleeve procedure.  She has lost a significant amount of weight.  She has had a cholecystectomy.  She is treated for overactive bladder with tolterodine ER, 4 mg daily with success.  She has discontinued estrogen/progestin therapy and denies menopausal symptoms.  She does have some loose stools.    SCREENING TESTS    Year 2012   Age                         PAP      Neg.                   HPV high risk                         Mammogram     benign benign                   MIKAYLA score                         Breast MRI                         Lipids                         Vitamin D                         Colonoscopy                         DEXA  Frax (hip/any)                         Ovarian Screen                             She exercises regularly: no.  She wears her seat belt: yes.  She has concerns about domestic violence: no.  She has noticed changes in height: no    GYN screening history:  · Last pap: was done on approximately 3/27/2017 and the result was: normal PAP.  · Last mammogram: was done on approximately 5/15/2017 and the result was: Birads II (Benign findings)..    No Additional Complaints Reported    The following portions of the patient's history were reviewed and updated as appropriate:vital signs and   She  does not have any pertinent problems on file.  She  has a past surgical history that includes Laparoscopic gastric banding (); Cholecystectomy (); Colonoscopy (); Gastric Banding Removal (N/A, 2016); Esophagogastroduodenoscopy (); [place] tooth extraction; Gastric Sleeve (N/A, 10/20/2017); and Esophagogastroduodenoscopy (N/A,  Continue PTA Elavil and Lexapro  Outpatient follow-up "10/20/2017).  Her family history includes Breast cancer (age of onset: 45) in her paternal aunt; Hypertension in her father, maternal grandfather, mother, and paternal grandmother; Lung cancer in her father; No Known Problems in her brother; Sleep apnea in her mother; Stroke in her maternal grandfather.  She  reports that she has never smoked. She has never used smokeless tobacco. She reports that she does not drink alcohol or use drugs.  Current Outpatient Prescriptions   Medication Sig Dispense Refill   • doxycycline (VIBRAMYCIN) 50 MG capsule Take 50 mg by mouth Daily.     • LISINOPRIL PO Take 40 mg by mouth Daily.     • pantoprazole (PROTONIX) 40 MG EC tablet Take 40 mg by mouth Daily.     • SOOLANTRA 1 % cream Apply 1 application topically Daily. Apply to face     • tolterodine LA (DETROL LA) 4 MG 24 hr capsule Take 4 mg by mouth Daily.     • vitamin D (ERGOCALCIFEROL) 47620 UNITS capsule capsule 50,000 Units 1 (One) Time Per Week. sundays       No current facility-administered medications for this visit.      She is allergic to penicillins..    Review of Systems  A comprehensive review of systems was taken.  Constitutional: negative for fever, chills, activity change, appetite change, fatigue and unexpected weight change.  Respiratory: negative  Cardiovascular: negative  Gastrointestinal: positive for diarrhea  Genitourinary:negative  Musculoskeletal:negative  Behavioral/Psych: negative       /80   Ht 160 cm (63\")   Wt 103 kg (226 lb)   BMI 40.03 kg/m²     Physical Exam    General:  alert; cooperative; well developed; well nourished  obese - Body mass index is 40.03 kg/m².   Skin:  No suspicious lesions seen   Thyroid: normal to inspection and palpation   Lungs:  clear to auscultation bilaterally   Heart:  regular rate and rhythm, S1, S2 normal, no murmur, click, rub or gallop   Breasts:  Examined in supine position  Symmetric without masses or skin dimpling  Nipples normal without inversion, lesions " or discharge  There are no palpable axillary nodes   Abdomen: soft, non-tender; no masses  no umbilical or inginual hernias are present  no hepato-splenomegaly   Pelvis: Clinical staff was present for exam  External genitalia:  normal appearance of the external genitalia including Bartholin's and [place]'s glands.  Vaginal:  normal pink mucosa without prolapse or lesions.  Cervix:  normal appearance.  Uterus:  normal size, shape and consistency. anteverted;  Adnexa:  non palpable bilaterally.  Rectal:  anus visually normal appearing. recto-vaginal exam unremarkable and confirms findings;     Lab Review   No data reviewed    Imaging  Mammogram results- benign         ASSESSMENT  Problems Addressed this Visit        Musculoskeletal and Integument    OAB (overactive bladder) - Primary       Genitourinary    Vaginal atrophy    Menopause      Other Visit Diagnoses     Encounter for gynecological examination without abnormal finding              PLAN    · Medications prescribed this encounter:  No orders of the defined types were placed in this encounter.  · Monthly self breast assessment and annual breast imaging  · Tolterodine ER, 4 mg daily is renewed by her PCP  · Calcium, 600 mg/ Vit. D, 400 IU daily; regular weight-bearing exercise  · Follow up: 12 month(s)  *Please note that portions of this documentation may have been completed with a voice recognition program.  Efforts were made to edit this dictation, but occasional words may have been mistranscribed.       This note was electronically signed.    POLLO Oseguera MD  March 28, 2018  10:48 AM

## 2023-11-10 NOTE — ASSESSMENT & PLAN NOTE
Acute on chronic hypokalemia secondary to excessive gastrointestinal loss, s/p gastric bypass surgery with previous frequent hospitalizations but states has improved since revision Jejuno-jejunal anastomosis at [facility] in June 2023.   Maintained on home potassium infusions managed by Eunice Blas's nephrology via right chest Port-A-Cath but outpatient labs show potassium of 2.8 patient was instructed to present for hospital admission   Symptomatic with myalgias  Continue IV potassium replacement -- another 80meq today and follow up BMP  Magnesium 2.5  Appreciate nephrology consultation

## 2023-11-10 NOTE — H&P
[address] [facility]  H&P  Name: Vivian Muñiz 43 y.o. female I MRN: [id number]  Unit/Bed#: -Dot I Date of Admission: 11/9/2023   Date of Service: 11/9/2023 I Hospital Day: 0      Assessment/Plan   * Hypokalemia  Assessment & Plan  Acute on chronic hypokalemia secondary to excessive gastrointestinal loss, s/p gastric bypass surgery with previous frequent hospitalizations but states has improved since revision Jejuno-jejunal anastomosis at [facility] in June 2023. Maintained on home potassium infusions managed by Jacey Blas's nephrology via right chest Port-A-Cath but outpatient labs today show potassium of 2.8 patient was instructed to present for hospital admission   Symptomatic with myalgias  Continue IV potassium replacement with Q 8-hour potassium labs  Magnesium 2.5  Appreciate nephrology consultation    History of anxiety  Assessment & Plan  Continue PTA Elavil and Lexapro  Outpatient follow-up    Chronic anemia  Assessment & Plan  Chronic anemia  History of vitamin B12 deficiency  Today with microcytic indices, check iron panel  Hgb 9.5 on admission           VTE Pharmacologic Prophylaxis: VTE Score: 0 Low Risk (Score 0-2) - Encourage Ambulation. Code Status: Level 1 - Full Code   Discussion with family: Patient declined call to . Anticipated Length of Stay: Patient will be admitted on an inpatient basis with an anticipated length of stay of greater than 2 midnights secondary to hypokalemial.    Total Time Spent on Date of Encounter in care of patient: 45 mins. This time was spent on one or more of the following: performing physical exam; counseling and coordination of care; obtaining or reviewing history; documenting in the medical record; reviewing/ordering tests, medications or procedures; communicating with other healthcare professionals and discussing with patient's family/caregivers.     Chief Complaint: hypokalemia    History of Present Illness:  Vivian Muñiz is a 43 y.o. female with a PMH of gastric bypass with chronic hypokalemia secondary to excessive gastrointestinal losses, states improved since bypass revision in 2023 and increased tolerance of oral nutrition. History of GERD, C. difficile, pancreatitis, anxiety, WAGNER previously on CPAP no longer using. Has right chest Port-A-Cath and infuses potassium daily, followed by nephrology. Today potassium 2.8 in the outpatient labs and recommended hospital admission. Reports myalgias similar to previous hypokalemia episodes. EKG on admission nonspecific ST changes. Review of Systems:  Review of Systems   Constitutional:  Negative for chills and fever. HENT:  Negative for ear pain and sore throat. Eyes:  Negative for pain and visual disturbance. Respiratory:  Negative for cough and shortness of breath. Cardiovascular:  Negative for chest pain and palpitations. Gastrointestinal:  Negative for abdominal pain and vomiting. Genitourinary:  Negative for dysuria and hematuria. Musculoskeletal:  Positive for myalgias. Negative for arthralgias and back pain. Skin:  Negative for color change and rash. Neurological:  Negative for seizures and syncope. All other systems reviewed and are negative.       Past Medical and Surgical History:   Past Medical History:   Diagnosis Date    C. difficile colitis 2016    COVID-19     2022- pt denies long covid    DUB (dysfunctional uterine bleeding)     H. pylori infection     Hypertension     Hypokalemia     Kidney stone 2023    Left lower quadrant abdominal pain 2020    Migraine     Pancreatitis     Psychiatric disorder     Anxiety    Rectal bleeding     Renal disorder     Rhabdomyolysis     Thrombosed external hemorrhoid        Past Surgical History:   Procedure Laterality Date    ABDOMINAL SURGERY      APPENDECTOMY       SECTION      CHOLECYSTECTOMY      COSMETIC SURGERY      "tummy tuck"    FL GUIDED CENTRAL VENOUS ACCESS DEVICE INSERTION 12/21/2018    FL RETROGRADE PYELOGRAM  1/3/2023    GASTRIC BYPASS      HYSTERECTOMY      LAPAROSCOPY      MS ANRCT XM SURG REQ ANES GENERAL SPI/EDRL DX N/A 9/23/2021    Procedure: ANAL EXAM UNDER ANESTHESIA; HEMORRHOIDECTOMY;  Surgeon: Antonio Ayala DO;  [facility];  Service: General    MS CYSTO/URETERO W/LITHOTRIPSY &INDWELL STENT INSRT Left 1/3/2023    Procedure: CYSTOSCOPY URETEROSCOPY WITH RETROGRADE PYELOGRAM, BASKET STONE EXTRACTION AND INSERTION STENT URETERAL;  Surgeon: Dilip Gonsales MD;  [facility];  Service: Urology    MS EXC THROMBOSED HEMORRHOID [place] N/A 7/8/2022    Procedure: EXCISION OF THROMBOSED EXTERNAL HEMORRHOID, Excision of perianal skin tag, dranage of perianal abscess, anal fistulatomy;  Surgeon: Antonio Ayala DO;  [facility];  Service: General    TUNNELED VENOUS PORT PLACEMENT N/A 12/21/2018    Procedure: INSERTION VENOUS PORT (PORT-A-CATH); Surgeon: Carmen Cevallos DO;  Location: [facility];  Service: General       Meds/Allergies:  Prior to Admission medications    Medication Sig Start Date End Date Taking?  Authorizing Provider   amitriptyline (ELAVIL) 10 mg tablet Take 2 tablets (20 mg total) by mouth daily at bedtime 5/1/22  Yes Handy Bhatia DO   cyanocobalamin 1,000 mcg/mL Inject 1 mL (1,000 mcg total) into a muscle every 30 (thirty) days Next dose due 5/1/2023 4/18/23  Yes Manav Chu MD   ergocalciferol (ERGOCALCIFEROL) 1.25 MG (67537 UT) capsule Take 1 capsule (50,000 Units total) by mouth once a week Mondays and Thursdays 4/18/23  Yes Manav Chu MD   escitalopram (LEXAPRO) 20 mg tablet 20 mg daily at bedtime 3/14/23  Yes Historical Provider, MD   ondansetron (ZOFRAN) 4 mg tablet Take 1 tablet (4 mg total) by mouth every 8 (eight) hours as needed for nausea or vomiting 3/6/22  Yes Lindsey Jiménez MD   ondansetron (ZOFRAN-ODT) 4 mg disintegrating tablet  6/12/23  Yes Historical Provider, MD   potassium chloride 0.4 mEq/mL Inject 200 mL (80 mEq total) into a catheter in a vein over 8 hours at 25 mL/hr daily 80 meq in 1 litre bag to be infused daily over 8 hours   Continue as ordered prior to admission 5/26/23  Yes David Jiménez MD   Syringe/Needle, Disp, (SYRINGE 3CC/25GX5/8") 25G X 5/8" 3 ML MISC Use once monthly for B12 injection. 6/1/20  Yes Juan Carlos Ricketts DO   traMADol (Ultram) 50 mg tablet Take 1 tablet (50 mg total) by mouth every 6 (six) hours as needed for moderate pain 8/11/23  Yes Ashwin Landin MD   vitamin A 3 MG (12423 UT) capsule Take 1 capsule by mouth 2 (two) times a day 6/22/23  Yes Historical Provider, MD   vitamin k 100 MCG tablet Take 1 tablet by mouth daily 6/20/23  Yes Historical Provider, MD   acetaminophen (TYLENOL) 325 mg tablet  6/16/23   Historical Provider, MD   oxyCODONE (Roxicodone) 5 immediate release tablet Take 1 tablet (5 mg total) by mouth every 6 (six) hours as needed for moderate pain or severe pain Max Daily Amount: 20 mg  Patient not taking: Reported on 11/9/2023 8/7/23   Cookie Otero DO     I have reviewed home medications with patient personally. Allergies: Allergies   Allergen Reactions    Nsaids Other (See Comments)     Other reaction(s): Other (Please comment)  Should not take s/p bariatric surgery  Other reaction(s):  Other (See Comments)  Should not take as per prior records  Should not take s/p bariatric surgery  Has hx of gastric bypass      Prednisone      Nausea, vomiting, diarrhea       Social History:  Marital Status: /Civil Union   Patient Pre-hospital Living Situation: Home  Patient Pre-hospital Level of Mobility: walks  Patient Pre-hospital Diet Restrictions: Low-fat  Substance Use History:   Social History     Substance and Sexual Activity   Alcohol Use Not Currently     Social History     Tobacco Use   Smoking Status Never   Smokeless Tobacco Never     Social History     Substance and Sexual Activity   Drug Use Yes    Types: Marijuana    Comment: Medical marijuana       Family History:  Family History   Problem Relation Age of Onset    No Known Problems Mother     Hypertension Father     Diabetes Father     Urolithiasis Father     Prostate cancer Father     Diabetes Maternal Grandfather        Physical Exam:     Vitals:   Blood Pressure: 122/57 (11/09/23 1807)  Pulse: 62 (11/09/23 1807)  Temperature: 98.2 °F (36.8 °C) (11/09/23 1807)  Temp Source: Temporal (11/09/23 1535)  Respirations: (!) 44 (11/09/23 1700)  Height: 5' 2" (157.5 cm) (11/09/23 1535)  Weight - Scale: 50.7 kg (111 lb 12.4 oz) (11/09/23 1535)  SpO2: 100 % (11/09/23 1807)    Physical Exam  Vitals and nursing note reviewed. Constitutional:       General: She is not in acute distress. Appearance: She is well-developed. HENT:      Head: Normocephalic and atraumatic. Mouth/Throat:      Mouth: Mucous membranes are moist.   Eyes:      Conjunctiva/sclera: Conjunctivae normal.   Cardiovascular:      Rate and Rhythm: Normal rate and regular rhythm. Heart sounds: No murmur heard. Pulmonary:      Effort: Pulmonary effort is normal. No respiratory distress. Breath sounds: Normal breath sounds. Comments: Right chest Port-A-Cath  Abdominal:      Palpations: Abdomen is soft. Tenderness: There is no abdominal tenderness. Musculoskeletal:         General: No swelling. Cervical back: Neck supple. Skin:     General: Skin is warm and dry. Capillary Refill: Capillary refill takes less than 2 seconds. Neurological:      General: No focal deficit present. Mental Status: She is alert and oriented to person, place, and time.    Psychiatric:         Mood and Affect: Mood normal.         Behavior: Behavior normal.        Additional Data:     Lab Results:  Results from last 7 days   Lab Units 11/09/23  1541   WBC Thousand/uL 8.99   HEMOGLOBIN g/dL 9.5*   HEMATOCRIT % 32.0*   PLATELETS Thousands/uL 416*   NEUTROS PCT % 60   LYMPHS PCT % 32   MONOS PCT % 7   EOS PCT % 0     Results from last 7 days   Lab Units 11/09/23  1541   SODIUM mmol/L 134*   POTASSIUM mmol/L 2.9*   CHLORIDE mmol/L 102   CO2 mmol/L 22   BUN mg/dL 19   CREATININE mg/dL 0.99   ANION GAP mmol/L 10   CALCIUM mg/dL 8.6   ALBUMIN g/dL 4.6   TOTAL BILIRUBIN mg/dL 0.28   ALK PHOS U/L 43   ALT U/L 13   AST U/L 18   GLUCOSE RANDOM mg/dL 93                       Lines/Drains:  Invasive Devices       Central Venous Catheter Line  Duration             Port A Cath Right Chest -- days                    Central Line:  Goal for removal: Will discontinue when hemodynamically stable. Port accessed. Will de-access as appropriate. Imaging: No pertinent imaging reviewed. No orders to display       EKG and Other Studies Reviewed on Admission:   EKG: NSR. HR nonspecific ST changes. ** Please Note: This note has been constructed using a voice recognition system.  **

## 2023-11-11 LAB
ANION GAP SERPL CALCULATED.3IONS-SCNC: 7 MMOL/L
ANION GAP SERPL CALCULATED.3IONS-SCNC: 7 MMOL/L
ATRIAL RATE: 85 BPM
BUN SERPL-MCNC: 21 MG/DL (ref 5–25)
BUN SERPL-MCNC: 22 MG/DL (ref 5–25)
CALCIUM SERPL-MCNC: 8.8 MG/DL (ref 8.4–10.2)
CALCIUM SERPL-MCNC: 9.4 MG/DL (ref 8.4–10.2)
CHLORIDE SERPL-SCNC: 105 MMOL/L (ref 96–108)
CHLORIDE SERPL-SCNC: 107 MMOL/L (ref 96–108)
CO2 SERPL-SCNC: 18 MMOL/L (ref 21–32)
CO2 SERPL-SCNC: 22 MMOL/L (ref 21–32)
CREAT SERPL-MCNC: 0.98 MG/DL (ref 0.6–1.3)
CREAT SERPL-MCNC: 1.15 MG/DL (ref 0.6–1.3)
ERYTHROCYTE [DISTWIDTH] IN BLOOD BY AUTOMATED COUNT: 17.4 % (ref 11.6–15.1)
GFR SERPL CREATININE-BSD FRML MDRD: 60 ML/MIN/1.73SQ M
GFR SERPL CREATININE-BSD FRML MDRD: 72 ML/MIN/1.73SQ M
GLUCOSE SERPL-MCNC: 104 MG/DL (ref 65–140)
GLUCOSE SERPL-MCNC: 80 MG/DL (ref 65–140)
HCT VFR BLD AUTO: 32.8 % (ref 34.8–46.1)
HGB BLD-MCNC: 9.4 G/DL (ref 11.5–15.4)
MCH RBC QN AUTO: 19.8 PG (ref 26.8–34.3)
MCHC RBC AUTO-ENTMCNC: 28.7 G/DL (ref 31.4–37.4)
MCV RBC AUTO: 69 FL (ref 82–98)
P AXIS: 72 DEGREES
PLATELET # BLD AUTO: 389 THOUSANDS/UL (ref 149–390)
PMV BLD AUTO: 10.2 FL (ref 8.9–12.7)
POTASSIUM SERPL-SCNC: 3.2 MMOL/L (ref 3.5–5.3)
POTASSIUM SERPL-SCNC: 5.1 MMOL/L (ref 3.5–5.3)
PR INTERVAL: 116 MS
QRS AXIS: 59 DEGREES
QRSD INTERVAL: 94 MS
QT INTERVAL: 364 MS
QTC INTERVAL: 433 MS
RBC # BLD AUTO: 4.75 MILLION/UL (ref 3.81–5.12)
SODIUM SERPL-SCNC: 130 MMOL/L (ref 135–147)
SODIUM SERPL-SCNC: 136 MMOL/L (ref 135–147)
T WAVE AXIS: 71 DEGREES
VENTRICULAR RATE: 85 BPM
WBC # BLD AUTO: 9.14 THOUSAND/UL (ref 4.31–10.16)

## 2023-11-11 PROCEDURE — 80048 BASIC METABOLIC PNL TOTAL CA: CPT | Performed by: HOSPITALIST

## 2023-11-11 PROCEDURE — 93010 ELECTROCARDIOGRAM REPORT: CPT | Performed by: STUDENT IN AN ORGANIZED HEALTH CARE EDUCATION/TRAINING PROGRAM

## 2023-11-11 PROCEDURE — 99232 SBSQ HOSP IP/OBS MODERATE 35: CPT | Performed by: HOSPITALIST

## 2023-11-11 PROCEDURE — 85027 COMPLETE CBC AUTOMATED: CPT | Performed by: HOSPITALIST

## 2023-11-11 PROCEDURE — 99232 SBSQ HOSP IP/OBS MODERATE 35: CPT | Performed by: NURSE PRACTITIONER

## 2023-11-11 RX ORDER — POTASSIUM CHLORIDE 14.9 MG/ML
20 INJECTION INTRAVENOUS
Status: COMPLETED | OUTPATIENT
Start: 2023-11-11 | End: 2023-11-11

## 2023-11-11 RX ADMIN — ONDANSETRON 4 MG: 2 INJECTION INTRAMUSCULAR; INTRAVENOUS at 08:50

## 2023-11-11 RX ADMIN — POTASSIUM CHLORIDE 20 MEQ: 14.9 INJECTION, SOLUTION INTRAVENOUS at 11:59

## 2023-11-11 RX ADMIN — AMITRIPTYLINE HYDROCHLORIDE 20 MG: 10 TABLET, FILM COATED ORAL at 21:24

## 2023-11-11 RX ADMIN — POTASSIUM CHLORIDE 20 MEQ: 14.9 INJECTION, SOLUTION INTRAVENOUS at 07:41

## 2023-11-11 RX ADMIN — POTASSIUM CHLORIDE 20 MEQ: 14.9 INJECTION, SOLUTION INTRAVENOUS at 09:45

## 2023-11-11 RX ADMIN — POTASSIUM CHLORIDE 20 MEQ: 14.9 INJECTION, SOLUTION INTRAVENOUS at 14:07

## 2023-11-11 RX ADMIN — ESCITALOPRAM OXALATE 20 MG: 10 TABLET ORAL at 21:24

## 2023-11-11 NOTE — PROGRESS NOTES
NEPHROLOGY PROGRESS NOTE   Jordi Santos 43 y.o. female MRN: [id number]  Unit/Bed#: MS [facility] Encounter: [id number]    Assessment/Plan:    42 yo female with history of Roshan-en-Y gastric bypass, chronic hypokalemia on chronic potassium infusions, hospitalization in June for enterocolitis with intussusception, nephrolithiasis, chronic pancreatitis admitted 11/9 with nausea, vomiting, diarrhea and symptomatic hypokalemia    1. Acute on chronic hypokalemia  Received 80 mill equivalents IV potassium daily at home with needs increase upwards to 240 mill equivalents daily prior to presentation. Patient is status post thorough work-up by primary nephrologist however was deemed not a candidate for evaluation at other centers per her report. She also states she has never received genetic testing and this can be considered. She received 80 mill equivalents IV potassium today and then BMP will be checked. Recommend control of GI symptoms prior to discharge. 2.  History of Roshan-en-Y gastric bypass  3. Recurrent intussusception status post jejunojejunal anastomosis revision in June 2023  4. Iron deficiency anemia  Defer IV iron at this time due to risk of oral side effects but should be considered as an outpatient  5. Hyperchloremic nonanion gap acidosis  In setting of high-dose potassium chloride now improved        ROS  Examined sitting upright in bed. States appetite is still poor and still has some nausea. Does not feel like her entire body is vibrating. Was able to eat and drink a little bit today. A complete 10 point review of systems have been performed and are otherwise negative.        Historical Information   Past Medical History:   Diagnosis Date    C. difficile colitis 2016    COVID-19     1/2022- pt denies long covid    DUB (dysfunctional uterine bleeding)     H. pylori infection     Hypertension     Hypokalemia     Kidney stone November 2023    Left lower quadrant abdominal pain 08/17/2020    Migraine Pancreatitis     Psychiatric disorder     Anxiety    Rectal bleeding     Renal disorder     Rhabdomyolysis     Thrombosed external hemorrhoid      Past Surgical History:   Procedure Laterality Date    ABDOMINAL SURGERY      APPENDECTOMY       SECTION      CHOLECYSTECTOMY      COSMETIC SURGERY      "tummy tuck"    FL GUIDED CENTRAL VENOUS ACCESS DEVICE INSERTION  2018    FL RETROGRADE PYELOGRAM  1/3/2023    GASTRIC BYPASS      HYSTERECTOMY      LAPAROSCOPY      MI ANRCT XM SURG REQ ANES GENERAL SPI/EDRL DX N/A 2021    Procedure: ANAL EXAM UNDER ANESTHESIA; HEMORRHOIDECTOMY;  Surgeon: Storm Noyola DO;  [facility];  Service: General    MI CYSTO/URETERO W/LITHOTRIPSY &INDWELL STENT INSRT Left 1/3/2023    Procedure: CYSTOSCOPY URETEROSCOPY WITH RETROGRADE PYELOGRAM, BASKET STONE EXTRACTION AND INSERTION STENT URETERAL;  Surgeon: Juan Antonio Hernandez MD;  [facility];  Service: Urology    MI EXC THROMBOSED HEMORRHOID [place] N/A 2022    Procedure: EXCISION OF THROMBOSED EXTERNAL HEMORRHOID, Excision of perianal skin tag, dranage of perianal abscess, anal fistulatomy;  Surgeon: Storm Noyola DO;  [facility];  Service: General    TUNNELED VENOUS PORT PLACEMENT N/A 2018    Procedure: INSERTION VENOUS PORT (PORT-A-CATH);   Surgeon: Carmella Santana DO;  [facility]: [facility];  Service: General     Social History   Social History     Substance and Sexual Activity   Alcohol Use Not Currently     Social History     Substance and Sexual Activity   Drug Use Yes    Types: Marijuana    Comment: Medical marijuana     Social History     Tobacco Use   Smoking Status Never   Smokeless Tobacco Never       Family History:   Family History   Problem Relation Age of Onset    No Known Problems Mother     Hypertension Father     Diabetes Father     Urolithiasis Father     Prostate cancer Father     Diabetes Maternal Grandfather        Medications:  Pertinent medications were reviewed  Current Facility-Administered Medications   Medication Dose Route Frequency Provider Last Rate    acetaminophen  650 mg Oral Q6H PRN Zakiya S Katey, CRNP      amitriptyline  20 mg Oral HS Zakiya S Katey, CRNP      escitalopram  20 mg Oral HS Zakiya S Katey, CRNP      ondansetron  4 mg Intravenous Q8H PRN Zakiya S Katey, CRNP      potassium chloride  20 mEq Intravenous Q2H Zakiya S Katey, CRNP 20 mEq (11/11/23 0945)    potassium chloride  20 mEq Intravenous Q2H Ross Glenn Counts, DO      traMADol  50 mg Oral Q6H PRN Zakiya S Katey, CRNP           Allergies   Allergen Reactions    Nsaids Other (See Comments)     Other reaction(s): Other (Please comment)  Should not take s/p bariatric surgery  Other reaction(s): Other (See Comments)  Should not take as per prior records  Should not take s/p bariatric surgery  Has hx of gastric bypass      Prednisone      Nausea, vomiting, diarrhea         Vitals:   /80   Pulse 92   Temp 97.5 °F (36.4 °C)   Resp 16   Ht 5' 2" (1.575 m)   Wt 50.7 kg (111 lb 12.4 oz)   LMP  (LMP Unknown)   SpO2 98%   BMI 20.44 kg/m²   Body mass index is 20.44 kg/m².   SpO2: 98 %,   SpO2 Activity: At Rest,   O2 Device: None (Room air)      Intake/Output Summary (Last 24 hours) at 11/11/2023 1037  Last data filed at 11/10/2023 1200  Gross per 24 hour   Intake 420 ml   Output --   Net 420 ml     Invasive Devices       Central Venous Catheter Line  Duration             Port A Cath Right Chest -- days                    Physical Exam  General: conscious, cooperative, in no acute distress  Eyes: conjunctivae pink, anicteric sclerae  ENT: lips and mucous membranes moist  Neck: supple, no JVD, no masses  Chest: clear breath sounds bilaterally, no crackles, ronchus or wheezings  CVS: S1 & S2, normal rate, regular rhythm  Abdomen: soft, non-tender, non-distended, normoactive bowel sounds  Extremities: no edema of both legs  Skin: no rash  Neuro: awake, alert, oriented      Diagnostic Data:  Lab: I have personally reviewed pertinent lab results. ,   CBC:  Results from last 7 days   Lab Units 11/11/23  0444   WBC Thousand/uL 9.14   HEMOGLOBIN g/dL 9.4*   HEMATOCRIT % 32.8*   PLATELETS Thousands/uL 389      CMP:   Lab Results   Component Value Date    SODIUM 136 11/11/2023    K 3.2 (L) 11/11/2023     11/11/2023    CO2 22 11/11/2023    BUN 22 11/11/2023    CREATININE 0.98 11/11/2023    CALCIUM 8.8 11/11/2023    EGFR 72 11/11/2023   ,   PT/INR: No results found for: "PT", "INR",   Magnesium: No components found for: "MAG",  Phosphorous: No results found for: "PHOS"    Microbiology:  @LABRCNTIP,(urinecx:7)@        ARIADNA Ziegler    Portions of the record may have been created with voice recognition software. Occasional wrong word or "sound a like" substitutions may have occurred due to the inherent limitations of voice recognition software. Read the chart carefully and recognize, using context, where substitutions have occurred.

## 2023-11-11 NOTE — ASSESSMENT & PLAN NOTE
Chronic anemia  History of vitamin B12 deficiency  Microcytic indices, check iron panel --- iron is low, patient does take iron supplement is following with her primary care provider, will avoid IV Venofer at this time due to GI symptoms  Hemoglobin stable

## 2023-11-11 NOTE — ASSESSMENT & PLAN NOTE
Acute on chronic hypokalemia secondary to excessive gastrointestinal loss, s/p gastric bypass surgery with previous frequent hospitalizations but states has improved since revision Jejuno-jejunal anastomosis at [facility] in June 2023.   Maintained on home potassium infusions managed by Tj Blas's nephrology via right chest Port-A-Cath but outpatient labs show potassium of 2.8 patient was instructed to present for hospital admission   Symptomatic with myalgias  Continue IV potassium replacement -- improved to 4.0 then fell 3.1 --> another 80meq today and follow up BMP  Magnesium 2.5  Appreciate nephrology consultation

## 2023-11-11 NOTE — PROGRESS NOTES
[address] [facility]  Progress Note  Name: Javier Christopher  MRN: [id number]  Unit/Bed#: -Dot I Date of Admission: 11/9/2023   Date of Service: 11/11/2023 I Hospital Day: 0    Assessment/Plan   * Hypokalemia  Assessment & Plan  Acute on chronic hypokalemia secondary to excessive gastrointestinal loss, s/p gastric bypass surgery with previous frequent hospitalizations but states has improved since revision Jejuno-jejunal anastomosis at [facility] in June 2023. Maintained on home potassium infusions managed by Orquidea Blas's nephrology via right chest Port-A-Cath but outpatient labs show potassium of 2.8 patient was instructed to present for hospital admission   Symptomatic with myalgias  Continue IV potassium replacement -- improved to 4.0 then fell 3.1 --> another 80meq today and follow up BMP  Magnesium 2.5  Appreciate nephrology consultation    History of anxiety  Assessment & Plan  Continue PTA Elavil and Lexapro  Outpatient follow-up    Chronic anemia  Assessment & Plan  Chronic anemia  History of vitamin B12 deficiency  Microcytic indices, check iron panel --- iron is low, patient does take iron supplement is following with her primary care provider, will avoid IV Venofer at this time due to GI symptoms  Hemoglobin stable               VTE Pharmacologic Prophylaxis: VTE Score: 0 Low Risk (Score 0-2) - Encourage Ambulation. Patient Centered Rounds: I performed bedside rounds with nursing staff today. Discussions with Specialists or Other Care Team Provider: none    Education and Discussions with Family / Patient: Patient declined call to . Total Time Spent on Date of Encounter in care of patient: 27 mins.  This time was spent on one or more of the following: performing physical exam; counseling and coordination of care; obtaining or reviewing history; documenting in the medical record; reviewing/ordering tests, medications or procedures; communicating with other healthcare professionals and discussing with patient's family/caregivers. Current Length of Stay: 0 day(s)  Current Patient Status: Inpatient   Certification Statement: The patient will continue to require additional inpatient hospital stay due to hypokalemia  Discharge Plan: Anticipate discharge tomorrow to home. Code Status: Level 1 - Full Code    Subjective:   Patient has mild nausea resolved with zofran    Objective:     Vitals:   Temp (24hrs), Av.8 °F (36.6 °C), Min:97.5 °F (36.4 °C), Max:98.1 °F (36.7 °C)    Temp:  [97.5 °F (36.4 °C)-98.1 °F (36.7 °C)] 97.5 °F (36.4 °C)  HR:  [72-92] 92  Resp:  [16-17] 16  BP: (109-120)/(58-80) 120/80  SpO2:  [97 %-99 %] 98 %  Body mass index is 20.44 kg/m². Input and Output Summary (last 24 hours):   No intake or output data in the 24 hours ending 23 1208    Physical Exam:   Physical Exam  Vitals and nursing note reviewed. Constitutional:       General: She is not in acute distress. Appearance: She is well-developed. HENT:      Head: Normocephalic and atraumatic. Eyes:      Conjunctiva/sclera: Conjunctivae normal.   Cardiovascular:      Rate and Rhythm: Normal rate and regular rhythm. Heart sounds: No murmur heard. Pulmonary:      Effort: Pulmonary effort is normal. No respiratory distress. Breath sounds: Normal breath sounds. Abdominal:      Palpations: Abdomen is soft. Tenderness: There is no abdominal tenderness. Musculoskeletal:         General: No swelling. Cervical back: Neck supple. Comments: Right chest port   Skin:     General: Skin is warm and dry. Capillary Refill: Capillary refill takes less than 2 seconds. Neurological:      Mental Status: She is alert.    Psychiatric:         Mood and Affect: Mood normal.          Additional Data:     Labs:  Results from last 7 days   Lab Units 23  0444 11/10/23  0515 23  1541   WBC Thousand/uL 9.14   < > 8.99   HEMOGLOBIN g/dL 9.4*   < > 9.5*   HEMATOCRIT % 32.8*   < > 32.0*   PLATELETS Thousands/uL 389   < > 416*   NEUTROS PCT %  --   --  60   LYMPHS PCT %  --   --  32   MONOS PCT %  --   --  7   EOS PCT %  --   --  0    < > = values in this interval not displayed. Results from last 7 days   Lab Units 11/11/23  0444 11/09/23  2344 11/09/23  1541   SODIUM mmol/L 136   < > 134*   POTASSIUM mmol/L 3.2*   < > 2.9*   CHLORIDE mmol/L 107   < > 102   CO2 mmol/L 22   < > 22   BUN mg/dL 22   < > 19   CREATININE mg/dL 0.98   < > 0.99   ANION GAP mmol/L 7   < > 10   CALCIUM mg/dL 8.8   < > 8.6   ALBUMIN g/dL  --   --  4.6   TOTAL BILIRUBIN mg/dL  --   --  0.28   ALK PHOS U/L  --   --  43   ALT U/L  --   --  13   AST U/L  --   --  18   GLUCOSE RANDOM mg/dL 80   < > 93    < > = values in this interval not displayed. Lines/Drains:  Invasive Devices       Central Venous Catheter Line  Duration             Port A Cath Right Chest -- days                    Central Line:  Goal for removal:  port             Imaging: No pertinent imaging reviewed. Recent Cultures (last 7 days):         Last 24 Hours Medication List:   Current Facility-Administered Medications   Medication Dose Route Frequency Provider Last Rate    acetaminophen  650 mg Oral Q6H PRN Zakiya S Katey, CRNP      amitriptyline  20 mg Oral HS Zakiya S Katey, CRNP      escitalopram  20 mg Oral HS Zakiya S Katey, CRNP      ondansetron  4 mg Intravenous Q8H PRN Zakiya S Katey, CRNP      potassium chloride  20 mEq Intravenous Q2H Ross Elizabeth Counts, DO 20 mEq (11/11/23 1159)    traMADol  50 mg Oral Q6H PRN Zakiya S Katey, CRNP          Today, Patient Was Seen By: Shanika Choi Counts, DO    **Please Note: This note may have been constructed using a voice recognition system. **

## 2023-11-12 VITALS
BODY MASS INDEX: 20.57 KG/M2 | WEIGHT: 111.77 LBS | TEMPERATURE: 97.7 F | HEIGHT: 62 IN | HEART RATE: 113 BPM | OXYGEN SATURATION: 97 % | SYSTOLIC BLOOD PRESSURE: 119 MMHG | RESPIRATION RATE: 18 BRPM | DIASTOLIC BLOOD PRESSURE: 68 MMHG

## 2023-11-12 LAB
ANION GAP SERPL CALCULATED.3IONS-SCNC: 8 MMOL/L
BUN SERPL-MCNC: 22 MG/DL (ref 5–25)
CALCIUM SERPL-MCNC: 9.1 MG/DL (ref 8.4–10.2)
CHLORIDE SERPL-SCNC: 107 MMOL/L (ref 96–108)
CO2 SERPL-SCNC: 20 MMOL/L (ref 21–32)
CREAT SERPL-MCNC: 1.02 MG/DL (ref 0.6–1.3)
GFR SERPL CREATININE-BSD FRML MDRD: 69 ML/MIN/1.73SQ M
GLUCOSE SERPL-MCNC: 93 MG/DL (ref 65–140)
POTASSIUM SERPL-SCNC: 3.8 MMOL/L (ref 3.5–5.3)
SODIUM SERPL-SCNC: 135 MMOL/L (ref 135–147)

## 2023-11-12 PROCEDURE — 99239 HOSP IP/OBS DSCHRG MGMT >30: CPT | Performed by: HOSPITALIST

## 2023-11-12 PROCEDURE — 99232 SBSQ HOSP IP/OBS MODERATE 35: CPT | Performed by: NURSE PRACTITIONER

## 2023-11-12 PROCEDURE — 80048 BASIC METABOLIC PNL TOTAL CA: CPT | Performed by: HOSPITALIST

## 2023-11-12 RX ORDER — POTASSIUM CHLORIDE 14.9 MG/ML
20 INJECTION INTRAVENOUS
Status: COMPLETED | OUTPATIENT
Start: 2023-11-12 | End: 2023-11-12

## 2023-11-12 RX ADMIN — POTASSIUM CHLORIDE 20 MEQ: 14.9 INJECTION, SOLUTION INTRAVENOUS at 08:51

## 2023-11-12 NOTE — PLAN OF CARE
Problem: PAIN - ADULT  Goal: Verbalizes/displays adequate comfort level or baseline comfort level  Description: Interventions:  - Encourage patient to monitor pain and request assistance  - Assess pain using appropriate pain scale  - Administer analgesics based on type and severity of pain and evaluate response  - Implement non-pharmacological measures as appropriate and evaluate response  - Consider cultural and social influences on pain and pain management  - Notify physician/advanced practitioner if interventions unsuccessful or patient reports new pain  11/12/2023 1223 by Ivonne Lyon RN  Outcome: Adequate for Discharge  11/12/2023 1049 by Ivonne Lyon RN  Outcome: Progressing     Problem: INFECTION - ADULT  Goal: Absence or prevention of progression during hospitalization  Description: INTERVENTIONS:  - Assess and monitor for signs and symptoms of infection  - Monitor lab/diagnostic results  - Monitor all insertion sites, i.e. indwelling lines, tubes, and drains  - Monitor endotracheal if appropriate and nasal secretions for changes in amount and color  - [place] appropriate cooling/warming therapies per order  - Administer medications as ordered  - Instruct and encourage patient and family to use good hand hygiene technique  - Identify and instruct in appropriate isolation precautions for identified infection/condition  11/12/2023 1223 by Ivonne Lyon RN  Outcome: Adequate for Discharge  11/12/2023 1049 by Ivonne Lyon RN  Outcome: Progressing  Goal: Absence of fever/infection during neutropenic period  Description: INTERVENTIONS:  - Monitor WBC    11/12/2023 1223 by Ivonne Lyon RN  Outcome: Adequate for Discharge  11/12/2023 1049 by Ivonne Lyon RN  Outcome: Progressing     Problem: SAFETY ADULT  Goal: Patient will remain free of falls  Description: INTERVENTIONS:  - Educate patient/family on patient safety including physical limitations  - Instruct patient to call for assistance with activity   - Consult OT/PT to assist with strengthening/mobility   - Keep Call bell within reach  - Keep bed low and locked with side rails adjusted as appropriate  - Keep care items and personal belongings within reach  - Initiate and maintain comfort rounds  - Make Fall Risk Sign visible to staff  - Apply yellow socks and bracelet for high fall risk patients  - Consider moving patient to room near nurses station  11/12/2023 1223 by Mariluz Montemayor RN  Outcome: Adequate for Discharge  11/12/2023 1049 by Mariluz Montemayor RN  Outcome: Progressing  Goal: Maintain or return to baseline ADL function  Description: INTERVENTIONS:  -  Assess patient's ability to carry out ADLs; assess patient's baseline for ADL function and identify physical deficits which impact ability to perform ADLs (bathing, care of mouth/teeth, toileting, grooming, dressing, etc.)  - Assess/evaluate cause of self-care deficits   - Assess range of motion  - Assess patient's mobility; develop plan if impaired  - Assess patient's need for assistive devices and provide as appropriate  - Encourage maximum independence but intervene and supervise when necessary  - Involve family in performance of ADLs  - Assess for home care needs following discharge   - Consider OT consult to assist with ADL evaluation and planning for discharge  - Provide patient education as appropriate  11/12/2023 1223 by Mariluz Montemayor RN  Outcome: Adequate for Discharge  11/12/2023 1049 by Mariluz Montemayor RN  Outcome: Progressing  Goal: Maintains/Returns to pre admission functional level  Description: INTERVENTIONS:  - Perform BMAT or MOVE assessment daily.   - Set and communicate daily mobility goal to care team and patient/family/caregiver.      - Out of bed for toileting  - Record patient progress and toleration of activity level   11/12/2023 1223 by Mariluz Montemayor RN  Outcome: Adequate for Discharge  11/12/2023 1049 by Mariluz Montemayor RN  Outcome: Progressing     Problem: DISCHARGE PLANNING  Goal: Discharge to home or other facility with appropriate resources  Description: INTERVENTIONS:  - Identify barriers to discharge w/patient and caregiver  - Arrange for needed discharge resources and transportation as appropriate  - Identify discharge learning needs (meds, wound care, etc.)  - Arrange for interpretive services to assist at discharge as needed  - Refer to Case Management Department for coordinating discharge planning if the patient needs post-hospital services based on physician/advanced practitioner order or complex needs related to functional status, cognitive ability, or social support system  11/12/2023 1223 by Sisi Eaton RN  Outcome: Adequate for Discharge  11/12/2023 1049 by Sisi Eaton RN  Outcome: Progressing     Problem: Knowledge Deficit  Goal: Patient/family/caregiver demonstrates understanding of disease process, treatment plan, medications, and discharge instructions  Description: Complete learning assessment and assess knowledge base.   Interventions:  - Provide teaching at level of understanding  - Provide teaching via preferred learning methods  11/12/2023 1223 by Sisi Eaton RN  Outcome: Adequate for Discharge  11/12/2023 1049 by Sisi Eaton RN  Outcome: Progressing     Problem: METABOLIC, FLUID AND ELECTROLYTES - ADULT  Goal: Electrolytes maintained within normal limits  Description: INTERVENTIONS:  - Monitor labs and assess patient for signs and symptoms of electrolyte imbalances  - Administer electrolyte replacement as ordered  - Monitor response to electrolyte replacements, including repeat lab results as appropriate  - Instruct patient on fluid and nutrition as appropriate  11/12/2023 1223 by Sisi Eaton RN  Outcome: Adequate for Discharge  11/12/2023 1049 by Sisi Eaton RN  Outcome: Progressing  Goal: Fluid balance maintained  Description: INTERVENTIONS:  - Monitor labs   - Monitor I/O and WT  - Instruct patient on fluid and nutrition as appropriate  - Assess for signs & symptoms of volume excess or deficit  11/12/2023 1223 by Pooja Peng RN  Outcome: Adequate for Discharge  11/12/2023 1049 by Pooja Peng RN  Outcome: Progressing  Goal: Glucose maintained within target range  Description: INTERVENTIONS:  - Monitor Blood Glucose as ordered  - Assess for signs and symptoms of hyperglycemia and hypoglycemia  - Administer ordered medications to maintain glucose within target range  - Assess nutritional intake and initiate nutrition service referral as needed  11/12/2023 1223 by Pooja Peng RN  Outcome: Adequate for Discharge  11/12/2023 1049 by Pooja Peng RN  Outcome: Progressing

## 2023-11-12 NOTE — UTILIZATION REVIEW
NOTIFICATION OF INPATIENT ADMISSION   AUTHORIZATION REQUEST   SERVICING FACILITY:   [address]  Tax ID: [id number]  NPI: [id number] ATTENDING PROVIDER:  Attending Name and NPI#: Ben Petit [[id number]]  Address: [address] [place] [address]  Phone: [phone]   ADMISSION INFORMATION:  Place of Service: [address] [place] [address]  Place of Service Code: 21  Inpatient Admission Date/Time: 11/11/23 12:06 PM  Discharge Date/Time: No discharge date for patient encounter. Admitting Diagnosis Code/Description:  Hypokalemia [E87.6]  Abnormal finding of diagnostic imaging [R93.89]     UTILIZATION REVIEW CONTACT:  Magdalena Khalil Utilization   Network Utilization Review Department  Phone: [phone]  Fax [phone]  Email: [email or web address]  Contact for approvals/pending authorizations, clinical reviews, and discharge. PHYSICIAN ADVISORY SERVICES:  Medical Necessity Denial & Fhpn-wa-Rlbk Review  Phone: [phone]  Fax: [phone]  Email: [email or web address]     DISCHARGE SUPPORT TEAM:  For Patients Discharge Needs & Updates  Phone: [phone] opt. 2 Fax: [phone]  Email: [email or web address]

## 2023-11-12 NOTE — ASSESSMENT & PLAN NOTE
Acute on chronic hypokalemia secondary to excessive gastrointestinal loss, s/p gastric bypass surgery with previous frequent hospitalizations but states has improved since revision Jejuno-jejunal anastomosis at [facility] in June 2023.   Maintained on home potassium infusions managed by [facility] nephrology via right chest Port-A-Cath but outpatient labs show potassium of 2.8 patient was instructed to present for hospital admission   Symptomatic with myalgias  Continue IV potassium replacement -- was 3.1 yesterday and 5 on recheck, down to 3.8 this morning --> giving 20meq this morning IV, and patient do continue home daily 80meq on discharge home  Magnesium 2.5  Appreciate nephrology consultation

## 2023-11-12 NOTE — DISCHARGE SUMMARY
[address] [facility]  Discharge- Elmyra Slight 1985, 43 y.o. female MRN: [id number]  Unit/Bed#: MS Patel-Dot Encounter: [id number]  Primary Care Provider: Nicholas Soriano MD   Date and time admitted to hospital: 11/9/2023  3:30 PM    * Hypokalemia  Assessment & Plan  Acute on chronic hypokalemia secondary to excessive gastrointestinal loss, s/p gastric bypass surgery with previous frequent hospitalizations but states has improved since revision Jejuno-jejunal anastomosis at [facility] in June 2023.   Maintained on home potassium infusions managed by Eunice Blas's nephrology via right chest Port-A-Cath but outpatient labs show potassium of 2.8 patient was instructed to present for hospital admission   Symptomatic with myalgias  Continue IV potassium replacement -- was 3.1 yesterday and 5 on recheck, down to 3.8 this morning --> giving 20meq this morning IV, and patient do continue home daily 80meq on discharge home  Magnesium 2.5  Appreciate nephrology consultation    History of anxiety  Assessment & Plan  Continue PTA Elavil and Lexapro  Outpatient follow-up    Chronic anemia  Assessment & Plan  Chronic anemia  History of vitamin B12 deficiency  Microcytic indices, check iron panel --- iron is low, patient does take iron supplement is following with her primary care provider, will avoid IV Venofer at this time due to GI symptoms  Hemoglobin stable        Medical Problems       Resolved Problems  Date Reviewed: 11/12/2023   None       Discharging Physician / Practitioner: Emory Koroma DO  PCP: Nicholas Soriano MD  Admission Date:   Admission Orders (From admission, onward)       Ordered        11/11/23 1206  Inpatient Admission  Once            11/09/23 1737  Place in Observation  Once                          Discharge Date: 11/12/23    Consultations During Hospital Stay:  nephrology    Procedures Performed:   none    Significant Findings / Test Results:   No orders to display     Lab Results Component Value Date    SODIUM 135 11/12/2023    K 3.8 11/12/2023     11/12/2023    CO2 20 (L) 11/12/2023    BUN 22 11/12/2023    CREATININE 1.02 11/12/2023    GLUC 93 11/12/2023    CALCIUM 9.1 11/12/2023     Lab Results   Component Value Date    WBC 9.14 11/11/2023    HGB 9.4 (L) 11/11/2023    HCT 32.8 (L) 11/11/2023    MCV 69 (L) 11/11/2023     11/11/2023         Incidental Findings:   See above   I reviewed the above mentioned incidental findings with the patient and/or family and they expressed understanding. Test Results Pending at Discharge (will require follow up):   none     Outpatient Tests Requested:  Resume regular follow up with Nephrology for potassium testing    Complications:  none    Reason for Admission: hypokalemia    Hospital Course: Jessica Teixeira is a 43 y.o. female patient who originally presented to the hospital on 11/9/2023 due to hypokalemia. Patient has dealt with chronic hypokalemia which she is managed by nephrology for at home, multifactorial.  She was supplemented with IV potassium and monitored, her potassium levels overall improved, denies any stable state to resume her home supplementation and follow-up with nephrology. Medically stable to discharge        Please see above list of diagnoses and related plan for additional information. Condition at Discharge: good    Discharge Day Visit / Exam:   Subjective: Patient's GI symptoms have improved, no acute complaints  Vitals: Blood Pressure: 119/68 (11/12/23 0815)  Pulse: (!) 113 (11/12/23 0815)  Temperature: 97.7 °F (36.5 °C) (11/12/23 0815)  Temp Source: Temporal (11/12/23 0815)  Respirations: 18 (11/12/23 0815)  Height: 5' 2" (157.5 cm) (11/09/23 1535)  Weight - Scale: 50.7 kg (111 lb 12.4 oz) (11/09/23 1535)  SpO2: 97 % (11/12/23 0815)  Exam:   Physical Exam  Vitals and nursing note reviewed. Constitutional:       General: She is not in acute distress. Appearance: She is well-developed.    HENT: Head: Normocephalic and atraumatic. Eyes:      Conjunctiva/sclera: Conjunctivae normal.   Cardiovascular:      Rate and Rhythm: Normal rate and regular rhythm. Heart sounds: No murmur heard. Pulmonary:      Effort: Pulmonary effort is normal. No respiratory distress. Breath sounds: Normal breath sounds. Abdominal:      Palpations: Abdomen is soft. Tenderness: There is no abdominal tenderness. Musculoskeletal:         General: No swelling. Cervical back: Neck supple. Comments: Right chest port   Skin:     General: Skin is warm and dry. Capillary Refill: Capillary refill takes less than 2 seconds. Neurological:      Mental Status: She is alert. Psychiatric:         Mood and Affect: Mood normal.          Discussion with Family: Patient declined call to . Discharge instructions/Information to patient and family:   See after visit summary for information provided to patient and family. Provisions for Follow-Up Care:  See after visit summary for information related to follow-up care and any pertinent home health orders. Disposition:   Home    Planned Readmission: no     Discharge Statement:  I spent 32 minutes discharging the patient. This time was spent on the day of discharge. I had direct contact with the patient on the day of discharge. Greater than 50% of the total time was spent examining patient, answering all patient questions, arranging and discussing plan of care with patient as well as directly providing post-discharge instructions. Additional time then spent on discharge activities. Discharge Medications:  See after visit summary for reconciled discharge medications provided to patient and/or family.       **Please Note: This note may have been constructed using a voice recognition system**

## 2023-11-12 NOTE — PROGRESS NOTES
NEPHROLOGY PROGRESS NOTE   Madie Topete 43 y.o. female MRN: [id number]  Unit/Bed#: -Dot Encounter: [id number]    Assessment/Plan:    44 yo female with history of Roshan-en-Y gastric bypass, chronic hypokalemia on chronic potassium infusions, hospitalization in June for enterocolitis with intussusception, nephrolithiasis, chronic pancreatitis admitted 11/9 with nausea, vomiting, diarrhea and symptomatic hypokalemia     1. Acute on chronic hypokalemia  Serum potassium improved 3.8 mmol/L and symptoms resolved. Oral intake is improved and she is ready to go home. Disposition- okay to be discharged. Will give 20 mEq IV potassium now and then she will restart home 80 mEq IV potassium in evening as typically scheduled. Outpatient lab work will obtained. Will discuss genetic testing further as an outpatient  2. History of Roshan-en-Y gastric bypass  3. Recurrent intussusception status post jejunojejunal anastomosis revision in June 2023  4. Iron deficiency anemia  Severe iron deficiency. Cannot tolerate oral iron due to decreased absorption and risk for GI side effects which will worsen potassium. Avoided IV iron in hospital given GI symptoms. Recommend IV iron as an outpatient   5. Hyperchloremic nonanion gap acidosis  Improved, in setting of aggressive IV potassium administration        ROS  Examined sitting upright in bed. No physical complaints. States she is ready to go home. A complete 10 point review of systems have been performed and are otherwise negative.        Historical Information   Past Medical History:   Diagnosis Date    C. difficile colitis 2016    COVID-19     1/2022- pt denies long covid    DUB (dysfunctional uterine bleeding)     H. pylori infection     Hypertension     Hypokalemia     Kidney stone November 2023    Left lower quadrant abdominal pain 08/17/2020    Migraine     Pancreatitis     Psychiatric disorder     Anxiety    Rectal bleeding     Renal disorder     Rhabdomyolysis     Thrombosed external hemorrhoid      Past Surgical History:   Procedure Laterality Date    ABDOMINAL SURGERY      APPENDECTOMY       SECTION      CHOLECYSTECTOMY      COSMETIC SURGERY      "tummy tuck"    FL GUIDED CENTRAL VENOUS ACCESS DEVICE INSERTION  2018    FL RETROGRADE PYELOGRAM  1/3/2023    GASTRIC BYPASS      HYSTERECTOMY      LAPAROSCOPY      NJ ANRCT XM SURG REQ ANES GENERAL SPI/EDRL DX N/A 2021    Procedure: ANAL EXAM UNDER ANESTHESIA; HEMORRHOIDECTOMY;  Surgeon: Erlinda Calderon DO;  Location: [facility];  Service: General    NJ CYSTO/URETERO W/LITHOTRIPSY &INDWELL STENT INSRT Left 1/3/2023    Procedure: CYSTOSCOPY URETEROSCOPY WITH RETROGRADE PYELOGRAM, BASKET STONE EXTRACTION AND INSERTION STENT URETERAL;  Surgeon: Sallie Keen MD;  Location: [facility];  Service: Urology    NJ EXC THROMBOSED HEMORRHOID [facility] N/A 2022    Procedure: EXCISION OF THROMBOSED EXTERNAL HEMORRHOID, Excision of perianal skin tag, dranage of perianal abscess, anal fistulatomy;  Surgeon: Erlinda Calderon DO;  Location: [facility];  Service: General    TUNNELED VENOUS PORT PLACEMENT N/A 2018    Procedure: INSERTION VENOUS PORT (PORT-A-CATH);   Surgeon: Benjamín Wright DO;  Location: [facility];  Service: General     Social History   Social History     Substance and Sexual Activity   Alcohol Use Not Currently     Social History     Substance and Sexual Activity   Drug Use Yes    Types: Marijuana    Comment: Medical marijuana     Social History     Tobacco Use   Smoking Status Never   Smokeless Tobacco Never       Family History:   Family History   Problem Relation Age of Onset    No Known Problems Mother     Hypertension Father     Diabetes Father     Urolithiasis Father     Prostate cancer Father     Diabetes Maternal Grandfather        Medications:  Pertinent medications were reviewed  Current Facility-Administered Medications   Medication Dose Route Frequency Provider Last Rate acetaminophen  650 mg Oral Q6H PRN Zakiya S Katey, CRNP      amitriptyline  20 mg Oral HS Zakiya S Katey, CRNP      escitalopram  20 mg Oral HS Zakiya S Katey, CRNP      ondansetron  4 mg Intravenous Q8H PRN Zakiya S Katey, CRNP      potassium chloride  20 mEq Intravenous Q2H Zetta Pallavi, CRNP      traMADol  50 mg Oral Q6H PRN Zakiya S Katey, CRNP           Allergies   Allergen Reactions    Nsaids Other (See Comments)     Other reaction(s): Other (Please comment)  Should not take s/p bariatric surgery  Other reaction(s): Other (See Comments)  Should not take as per prior records  Should not take s/p bariatric surgery  Has hx of gastric bypass      Prednisone      Nausea, vomiting, diarrhea         Vitals:   /68   Pulse (!) 113   Temp 97.7 °F (36.5 °C)   Resp 18   Ht 5' 2" (1.575 m)   Wt 50.7 kg (111 lb 12.4 oz)   LMP  (LMP Unknown)   SpO2 97%   BMI 20.44 kg/m²   Body mass index is 20.44 kg/m². SpO2: 97 %,   SpO2 Activity: At Rest,   O2 Device: None (Room air)      Intake/Output Summary (Last 24 hours) at 11/12/2023 8990  Last data filed at 11/11/2023 1700  Gross per 24 hour   Intake 220 ml   Output --   Net 220 ml     Invasive Devices       Central Venous Catheter Line  Duration             Port A Cath Right Chest -- days                    Physical Exam  General: conscious, cooperative, in no acute distress  Eyes: conjunctivae pink, anicteric sclerae  ENT: lips and mucous membranes moist  Neck: supple, no JVD, no masses  Chest: clear breath sounds bilaterally, no crackles, ronchus or wheezings  CVS: S1 & S2, normal rate, regular rhythm  Abdomen: soft, non-tender, non-distended, normoactive bowel sounds  Extremities: no edema of both legs  Skin: no rash  Neuro: awake, alert, oriented      Diagnostic Data:  Lab: I have personally reviewed pertinent lab results. ,   CBC:  Results from last 7 days   Lab Units 11/11/23  0444   WBC Thousand/uL 9.14   HEMOGLOBIN g/dL 9.4*   HEMATOCRIT % 32.8*   PLATELETS Thousands/uL 389      CMP:   Lab Results   Component Value Date    SODIUM 135 11/12/2023    K 3.8 11/12/2023     11/12/2023    CO2 20 (L) 11/12/2023    BUN 22 11/12/2023    CREATININE 1.02 11/12/2023    CALCIUM 9.1 11/12/2023    EGFR 69 11/12/2023   ,   PT/INR: No results found for: "PT", "INR",   Magnesium: No components found for: "MAG",  Phosphorous: No results found for: "PHOS"    Microbiology:  @[facility],(urinecx:7)@        ARIADNA Do    Portions of the record may have been created with voice recognition software. Occasional wrong word or "sound a like" substitutions may have occurred due to the inherent limitations of voice recognition software. Read the chart carefully and recognize, using context, where substitutions have occurred.

## 2023-11-12 NOTE — PLAN OF CARE
Problem: PAIN - ADULT  Goal: Verbalizes/displays adequate comfort level or baseline comfort level  Description: Interventions:  - Encourage patient to monitor pain and request assistance  - Assess pain using appropriate pain scale  - Administer analgesics based on type and severity of pain and evaluate response  - Implement non-pharmacological measures as appropriate and evaluate response  - Consider cultural and social influences on pain and pain management  - Notify physician/advanced practitioner if interventions unsuccessful or patient reports new pain  Outcome: Progressing     Problem: INFECTION - ADULT  Goal: Absence or prevention of progression during hospitalization  Description: INTERVENTIONS:  - Assess and monitor for signs and symptoms of infection  - Monitor lab/diagnostic results  - Monitor all insertion sites, i.e. indwelling lines, tubes, and drains  - Monitor endotracheal if appropriate and nasal secretions for changes in amount and color  - Radisson appropriate cooling/warming therapies per order  - Administer medications as ordered  - Instruct and encourage patient and family to use good hand hygiene technique  - Identify and instruct in appropriate isolation precautions for identified infection/condition  Outcome: Progressing  Goal: Absence of fever/infection during neutropenic period  Description: INTERVENTIONS:  - Monitor WBC    Outcome: Progressing     Problem: SAFETY ADULT  Goal: Patient will remain free of falls  Description: INTERVENTIONS:  - Educate patient/family on patient safety including physical limitations  - Instruct patient to call for assistance with activity   - Consult OT/PT to assist with strengthening/mobility   - Keep Call bell within reach  - Keep bed low and locked with side rails adjusted as appropriate  - Keep care items and personal belongings within reach  - Initiate and maintain comfort rounds  - Make Fall Risk Sign visible to staff  - Apply yellow socks and bracelet for high fall risk patients  - Consider moving patient to room near nurses station  Outcome: Progressing  Goal: Maintain or return to baseline ADL function  Description: INTERVENTIONS:  -  Assess patient's ability to carry out ADLs; assess patient's baseline for ADL function and identify physical deficits which impact ability to perform ADLs (bathing, care of mouth/teeth, toileting, grooming, dressing, etc.)  - Assess/evaluate cause of self-care deficits   - Assess range of motion  - Assess patient's mobility; develop plan if impaired  - Assess patient's need for assistive devices and provide as appropriate  - Encourage maximum independence but intervene and supervise when necessary  - Involve family in performance of ADLs  - Assess for home care needs following discharge   - Consider OT consult to assist with ADL evaluation and planning for discharge  - Provide patient education as appropriate  Outcome: Progressing  Goal: Maintains/Returns to pre admission functional level  Description: INTERVENTIONS:  - Perform BMAT or MOVE assessment daily.   - Set and communicate daily mobility goal to care team and patient/family/caregiver.    - Collaborate with rehabilitation services on mobility goals if consulted  - Out of bed for toileting  - Record patient progress and toleration of activity level   Outcome: Progressing     Problem: DISCHARGE PLANNING  Goal: Discharge to home or other facility with appropriate resources  Description: INTERVENTIONS:  - Identify barriers to discharge w/patient and caregiver  - Arrange for needed discharge resources and transportation as appropriate  - Identify discharge learning needs (meds, wound care, etc.)  - Arrange for interpretive services to assist at discharge as needed  - Refer to Case Management Department for coordinating discharge planning if the patient needs post-hospital services based on physician/advanced practitioner order or complex needs related to functional status, cognitive ability, or social support system  Outcome: Progressing     Problem: Knowledge Deficit  Goal: Patient/family/caregiver demonstrates understanding of disease process, treatment plan, medications, and discharge instructions  Description: Complete learning assessment and assess knowledge base.   Interventions:  - Provide teaching at level of understanding  - Provide teaching via preferred learning methods  Outcome: Progressing     Problem: METABOLIC, FLUID AND ELECTROLYTES - ADULT  Goal: Electrolytes maintained within normal limits  Description: INTERVENTIONS:  - Monitor labs and assess patient for signs and symptoms of electrolyte imbalances  - Administer electrolyte replacement as ordered  - Monitor response to electrolyte replacements, including repeat lab results as appropriate  - Instruct patient on fluid and nutrition as appropriate  Outcome: Progressing  Goal: Fluid balance maintained  Description: INTERVENTIONS:  - Monitor labs   - Monitor I/O and WT  - Instruct patient on fluid and nutrition as appropriate  - Assess for signs & symptoms of volume excess or deficit  Outcome: Progressing  Goal: Glucose maintained within target range  Description: INTERVENTIONS:  - Monitor Blood Glucose as ordered  - Assess for signs and symptoms of hyperglycemia and hypoglycemia  - Administer ordered medications to maintain glucose within target range  - Assess nutritional intake and initiate nutrition service referral as needed  Outcome: Progressing

## 2023-11-12 NOTE — DISCHARGE INSTR - AVS FIRST PAGE
- Recommend continuing follow up with Nephrology as prior with hypokalemia monitoring  - Recommend resuming your 80meq Kcl IV at home upon discharge, please resume today

## 2023-11-13 NOTE — UTILIZATION REVIEW
NOTIFICATION OF ADMISSION DISCHARGE   This is a Notification of Discharge from [address] [place] [address]. Please be advised that this patient has been discharge from our facility. Below you will find the admission and discharge date and time including the patient’s disposition. UTILIZATION REVIEW CONTACT:  [facility]  Utilization   Network Utilization Review Department  Phone: [phone] x carefully listen to the prompts. All voicemails are confidential.  Email: [email or web address]     ADMISSION INFORMATION  PRESENTATION DATE: 11/9/2023  3:30 PM    INPATIENT ADMISSION DATE: 11/11/23 12:06 PM   DISCHARGE DATE: 11/12/2023 12:31 PM   DISPOSITION:Home/Self Care    Network Utilization Review Department  ATTENTION: Please call with any questions or concerns to [phone] and carefully listen to the prompts so that you are directed to the right person. All voicemails are confidential.   For Discharge needs, contact Care Management DC Support Team at [phone] opt. 2  Send all requests for admission clinical reviews, approved or denied determinations and any other requests to dedicated fax number below belonging to the campus where the patient is receiving treatment.  List of dedicated fax numbers for the Facilities:  [place] DENIALS (Administrative/Medical Necessity) [phone]   DISCHARGE SUPPORT TEAM (Network) [phone] [facility] [phone]   [address] [place] [address] Drive [phone]   [facility] [address] [facility] [phone] [address] [address] [facility] [address]250 [facility] [address] [facility]  [address] [facility] [address] [facility] [address] DO Franci  Physician  Hospitalist     Discharge Summary     Signed     Date of Service: 11/12/2023 11:18 AM     Signed         [address]  Discharge- Juan Lau 1985, 43 y.o. female MRN: [id number]  Unit/Bed#: -Dot Encounter: [id number]  Primary Care Provider: Jesi Kaplan MD   Date and time admitted to hospital: 11/9/2023  3:30 PM     * Hypokalemia  Assessment & Plan  Acute on chronic hypokalemia secondary to excessive gastrointestinal loss, s/p gastric bypass surgery with previous frequent hospitalizations but states has improved since revision Jejuno-jejunal anastomosis at [facility] in June 2023.   Maintained on home potassium infusions managed by [facility] nephrology via right chest Port-A-Cath but outpatient labs show potassium of 2.8 patient was instructed to present for hospital admission   Symptomatic with myalgias  Continue IV potassium replacement -- was 3.1 yesterday and 5 on recheck, down to 3.8 this morning --> giving 20meq this morning IV, and patient do continue home daily 80meq on discharge home  Magnesium 2.5  Appreciate nephrology consultation     History of anxiety  Assessment & Plan  Continue PTA Elavil and Lexapro  Outpatient follow-up     Chronic anemia  Assessment & Plan  Chronic anemia  History of vitamin B12 deficiency  Microcytic indices, check iron panel --- iron is low, patient does take iron supplement is following with her primary care provider, will avoid IV Venofer at this time due to GI symptoms  Hemoglobin stable           Medical Problems         Resolved Problems  Date Reviewed: 11/12/2023   None         Discharging Physician / Practitioner: Joyce Koroma DO  PCP: Jesi Kaplan MD  Admission Date:   Admission Orders (From admission, onward)          Ordered         11/11/23 1206   Inpatient Admission  Once             11/09/23 1737   Place in Observation  Once                               Discharge Date: 11/12/23     Consultations During Hospital Stay:  nephrology     Procedures Performed:   none     Significant Findings / Test Results:   No orders to display            Lab Results   Component Value Date     SODIUM 135 11/12/2023     K 3.8 11/12/2023      11/12/2023     CO2 20 (L) 11/12/2023     BUN 22 11/12/2023     CREATININE 1.02 11/12/2023     GLUC 93 11/12/2023     CALCIUM 9.1 11/12/2023            Lab Results   Component Value Date     WBC 9.14 11/11/2023     HGB 9.4 (L) 11/11/2023     HCT 32.8 (L) 11/11/2023     MCV 69 (L) 11/11/2023      11/11/2023            Incidental Findings:   See above   I reviewed the above mentioned incidental findings with the patient and/or family and they expressed understanding. Test Results Pending at Discharge (will require follow up):   none     Outpatient Tests Requested:  Resume regular follow up with Nephrology for potassium testing     Complications:  none     Reason for Admission: hypokalemia     Hospital Course: Orlando Scruggs is a 43 y.o. female patient who originally presented to the hospital on 11/9/2023 due to hypokalemia. Patient has dealt with chronic hypokalemia which she is managed by nephrology for at home, multifactorial.  She was supplemented with IV potassium and monitored, her potassium levels overall improved, denies any stable state to resume her home supplementation and follow-up with nephrology. Medically stable to discharge           Please see above list of diagnoses and related plan for additional information.       Condition at Discharge: good     Discharge Day Visit / Exam:   Subjective: Patient's GI symptoms have improved, no acute complaints  Vitals: Blood Pressure: 119/68 (11/12/23 0815)  Pulse: (!) 113 (11/12/23 0815)  Temperature: 97.7 °F (36.5 °C) (11/12/23 0815)  Temp Source: Temporal (11/12/23 0815)  Respirations: 18 (11/12/23 0815)  Height: 5' 2" (157.5 cm) (11/09/23 1535)  Weight - Scale: 50.7 kg (111 lb 12.4 oz) (11/09/23 1535)  SpO2: 97 % (11/12/23 0815)  Exam:   Physical Exam  Vitals and nursing note reviewed. Constitutional:       General: She is not in acute distress. Appearance: She is well-developed. HENT:      Head: Normocephalic and atraumatic. Eyes:      Conjunctiva/sclera: Conjunctivae normal.   Cardiovascular:      Rate and Rhythm: Normal rate and regular rhythm. Heart sounds: No murmur heard. Pulmonary:      Effort: Pulmonary effort is normal. No respiratory distress. Breath sounds: Normal breath sounds. Abdominal:      Palpations: Abdomen is soft. Tenderness: There is no abdominal tenderness. Musculoskeletal:         General: No swelling. Cervical back: Neck supple. Comments: Right chest port   Skin:     General: Skin is warm and dry. Capillary Refill: Capillary refill takes less than 2 seconds. Neurological:      Mental Status: She is alert. Psychiatric:         Mood and Affect: Mood normal.            Discussion with Family: Patient declined call to . Discharge instructions/Information to patient and family:   See after visit summary for information provided to patient and family. Provisions for Follow-Up Care:  See after visit summary for information related to follow-up care and any pertinent home health orders. Disposition:   Home     Planned Readmission: no     Discharge Statement:  I spent 32 minutes discharging the patient. This time was spent on the day of discharge. I had direct contact with the patient on the day of discharge. Greater than 50% of the total time was spent examining patient, answering all patient questions, arranging and discussing plan of care with patient as well as directly providing post-discharge instructions. Additional time then spent on discharge activities. Discharge Medications:  See after visit summary for reconciled discharge medications provided to patient and/or family.        **Please Note: This note may have been constructed using a voice recognition system**

## 2023-11-16 ENCOUNTER — OFFICE VISIT (OUTPATIENT)
Dept: NEPHROLOGY | Facility: CLINIC | Age: 38
End: 2023-11-16
Payer: COMMERCIAL

## 2023-11-16 VITALS
DIASTOLIC BLOOD PRESSURE: 60 MMHG | WEIGHT: 114 LBS | HEART RATE: 66 BPM | BODY MASS INDEX: 20.85 KG/M2 | OXYGEN SATURATION: 99 % | SYSTOLIC BLOOD PRESSURE: 92 MMHG

## 2023-11-16 DIAGNOSIS — E87.6 HYPOKALEMIA: ICD-10-CM

## 2023-11-16 DIAGNOSIS — N20.0 NEPHROLITHIASIS: Primary | ICD-10-CM

## 2023-11-16 DIAGNOSIS — D50.8 IRON DEFICIENCY ANEMIA DUE TO DIETARY CAUSES: ICD-10-CM

## 2023-11-16 DIAGNOSIS — Z98.84 HISTORY OF GASTRIC BYPASS: ICD-10-CM

## 2023-11-16 DIAGNOSIS — E83.39 HYPOPHOSPHATEMIA: ICD-10-CM

## 2023-11-16 DIAGNOSIS — E61.1 IRON DEFICIENCY: ICD-10-CM

## 2023-11-16 PROCEDURE — 99214 OFFICE O/P EST MOD 30 MIN: CPT | Performed by: INTERNAL MEDICINE

## 2023-11-16 RX ORDER — SODIUM CHLORIDE 9 MG/ML
20 INJECTION, SOLUTION INTRAVENOUS ONCE
OUTPATIENT
Start: 2023-11-21

## 2023-11-16 RX ORDER — DICYCLOMINE HYDROCHLORIDE 10 MG/1
CAPSULE ORAL
COMMUNITY
Start: 2023-10-10

## 2023-11-16 NOTE — PROGRESS NOTES
[facility] [place], [place]    Name: Addison Olivier  YOB: 1985      Assessment/Plan:    Post hospital follow up       Problem List Items Addressed This Visit          Genitourinary    Nephrolithiasis - Primary     No recent stone attacks. Had calcium oxalate stones  Drinks adequate water daily and avoids salt            Other    Hypokalemia     Last potassium level on 11/12 was 3.8. Continue with high potassium foods such as sweet potatoes, dried fruits and citrus, tomatoes  Reviewed her diet and she is following this Continue KCl 80 meq IV daily  Recheck K and mg in a week  Stefana genetic testing done today in the office         History of gastric bypass     Has been stable. Eats small quantities due to inc nausea and vomiting         Hypophosphatemia     Recheck phosphorus and calcium              Subjective:      Patient ID: Addison Olivier is a 43 y.o. female. referred by Dr Timothy Velasco    HPI was admitted to [facility] with profound hypokalemia, nausea and vomiting. She was treated with IV potassium and discharged. She utilizes KCl 80 mEq daily IV at 10 mEq an hour. For 2 days prior she did increase it to 160 mEq a day. However this was sensitive fashion and she presented to the hospital.  She has a history of a gastric bypass with loss of over 100 pounds. Last urine she was admitted with enterocolitis with intussusception. Has a history of chronic pancreatitis and nephrolithiasis she has not had any episodes recently she is eating and drinking well and her weight is stable. After Prema Walker did suggest that she have genetic testing test with  Chapin Dover was done today in office    The following portions of the patient's history were reviewed and updated as appropriate: allergies, current medications, past family history, past medical history, past social history, past surgical history and problem list.    Review of Systems   Constitutional:  Positive for fatigue.    Eyes: Negative for visual disturbance. Respiratory:  Negative for cough and shortness of breath. Cardiovascular:  Negative for chest pain, palpitations and leg swelling. Gastrointestinal:  Negative for abdominal pain, blood in stool, constipation, nausea and vomiting. Follows with weight management and they want her to inc food intake to 3 meals daily + 3 snacks. Her calorie consumption is gen < 1000 calories a day and needs inc intake  They changed her to whole fat milk and starchy snacks and nuts   Genitourinary:  Negative for decreased urine volume, dysuria, hematuria and urgency. Musculoskeletal:  Negative for arthralgias and back pain. Neurological:  Negative for dizziness and weakness. Psychiatric/Behavioral:  Negative for dysphoric mood. Social History     Socioeconomic History    Marital status: /Civil Union     Spouse name: None    Number of children: None    Years of education: None    Highest education level: None   Occupational History    None   Tobacco Use    Smoking status: Never    Smokeless tobacco: Never   Vaping Use    Vaping Use: Never used   Substance and Sexual Activity    Alcohol use: Not Currently    Drug use: Yes     Types: Marijuana     Comment: Medical marijuana    Sexual activity: Yes     Partners: Male     Birth control/protection: Surgical   Other Topics Concern    None   Social History Narrative    Rarely consumes alcohol - As per [employer]      Social Determinants of Health     Financial Resource Strain: Not on file   Food Insecurity: Food Insecurity Present (9/2/2022)    Hunger Vital Sign     Worried About [place] in the Last Year: Sometimes true     Ran Out of Food in the Last Year: Sometimes true   Transportation Needs: Unknown (9/2/2022)    PRAPARE - Transportation     Lack of Transportation (Medical): Not on file     Lack of Transportation (Non-Medical):  No   Physical Activity: Not on file   Stress: Not on file   Social Connections: Not on file   Intimate Partner Violence: Not on file   Housing Stability: Low Risk  (2022)    Housing Stability Vital Sign     Unable to Pay for Housing in the Last Year: No     Number of Places Lived in the Last Year: 1     Unstable Housing in the Last Year: No     Past Medical History:   Diagnosis Date    C. difficile colitis 2016    COVID-19     2022- pt denies long covid    DUB (dysfunctional uterine bleeding)     H. pylori infection     Hypertension     Hypokalemia     Kidney stone 2023    Left lower quadrant abdominal pain 2020    Migraine     Pancreatitis     Psychiatric disorder     Anxiety    Rectal bleeding     Renal disorder     Rhabdomyolysis     Thrombosed external hemorrhoid      Past Surgical History:   Procedure Laterality Date    ABDOMINAL SURGERY      APPENDECTOMY       SECTION      CHOLECYSTECTOMY      COSMETIC SURGERY      "tummy tuck"    FL GUIDED CENTRAL VENOUS ACCESS DEVICE INSERTION  2018    FL RETROGRADE PYELOGRAM  1/3/2023    GASTRIC BYPASS      HYSTERECTOMY      LAPAROSCOPY      TX ANRCT XM SURG REQ ANES GENERAL SPI/EDRL DX N/A 2021    Procedure: ANAL EXAM UNDER ANESTHESIA; HEMORRHOIDECTOMY;  Surgeon: Erlinda Calderon DO;  Location: [facility];  Service: General    TX CYSTO/URETERO W/LITHOTRIPSY &INDWELL STENT INSRT Left 1/3/2023    Procedure: CYSTOSCOPY URETEROSCOPY WITH RETROGRADE PYELOGRAM, BASKET STONE EXTRACTION AND INSERTION STENT URETERAL;  Surgeon: Sallie Keen MD;  Location: [facility];  Service: Urology    TX EXC THROMBOSED HEMORRHOID [facility] N/A 2022    Procedure: EXCISION OF THROMBOSED EXTERNAL HEMORRHOID, Excision of perianal skin tag, dranage of perianal abscess, anal fistulatomy;  Surgeon: Erlinda Calderon DO;  Location: [facility];  Service: General    TUNNELED VENOUS PORT PLACEMENT N/A 2018    Procedure: INSERTION VENOUS PORT (PORT-A-CATH);   Surgeon: Benjamín Wright DO;  Location: [facility];  Service: General Current Outpatient Medications:     amitriptyline (ELAVIL) 10 mg tablet, Take 2 tablets (20 mg total) by mouth daily at bedtime, Disp: 60 tablet, Rfl: 0    cyanocobalamin 1,000 mcg/mL, Inject 1 mL (1,000 mcg total) into a muscle every 30 (thirty) days Next dose due 5/1/2023, Disp: , Rfl:     dicyclomine (BENTYL) 10 mg capsule, , Disp: , Rfl:     ergocalciferol (ERGOCALCIFEROL) 1.25 MG (48985 UT) capsule, Take 1 capsule (50,000 Units total) by mouth once a week Mondays and Thursdays, Disp: , Rfl:     escitalopram (LEXAPRO) 20 mg tablet, 20 mg daily at bedtime, Disp: , Rfl:     ondansetron (ZOFRAN) 4 mg tablet, Take 1 tablet (4 mg total) by mouth every 8 (eight) hours as needed for nausea or vomiting, Disp: 20 tablet, Rfl: 0    ondansetron (ZOFRAN-ODT) 4 mg disintegrating tablet, , Disp: , Rfl:     potassium chloride 0.4 mEq/mL, Inject 200 mL (80 mEq total) into a catheter in a vein over 8 hours at 25 mL/hr daily 80 meq in 1 litre bag to be infused daily over 8 hours  Continue as ordered prior to admission, Disp: 2800 mL, Rfl: 0    Syringe/Needle, Disp, (SYRINGE 3CC/25GX5/8") 25G X 5/8" 3 ML MISC, Use once monthly for B12 injection. , Disp: 1 each, Rfl: 11    vitamin A 3 MG (14380 UT) capsule, Take 1 capsule by mouth 2 (two) times a day, Disp: , Rfl:     vitamin k 100 MCG tablet, Take 1 tablet by mouth daily, Disp: , Rfl:     acetaminophen (TYLENOL) 325 mg tablet, , Disp: , Rfl:     oxyCODONE (Roxicodone) 5 immediate release tablet, Take 1 tablet (5 mg total) by mouth every 6 (six) hours as needed for moderate pain or severe pain Max Daily Amount: 20 mg (Patient not taking: Reported on 11/9/2023), Disp: 10 tablet, Rfl: 0    traMADol (Ultram) 50 mg tablet, Take 1 tablet (50 mg total) by mouth every 6 (six) hours as needed for moderate pain (Patient not taking: Reported on 11/16/2023), Disp: 30 tablet, Rfl: 0    Lab Results   Component Value Date    SODIUM 135 11/12/2023    K 3.8 11/12/2023     11/12/2023 CO2 20 (L) 11/12/2023    AGAP 8 11/12/2023    BUN 22 11/12/2023    CREATININE 1.02 11/12/2023    GLUC 93 11/12/2023    GLUF 83 10/30/2023    CALCIUM 9.1 11/12/2023    AST 18 11/09/2023    ALT 13 11/09/2023    ALKPHOS 43 11/09/2023    TP 7.8 11/09/2023    TBILI 0.28 11/09/2023    EGFR 69 11/12/2023     Lab Results   Component Value Date    WBC 9.14 11/11/2023    HGB 9.4 (L) 11/11/2023    HCT 32.8 (L) 11/11/2023    MCV 69 (L) 11/11/2023     11/11/2023     Lab Results   Component Value Date    CHOLESTEROL 161 02/19/2021     Lab Results   Component Value Date    HDL 70 02/19/2021     Lab Results   Component Value Date    LDLCALC 70 02/19/2021     Lab Results   Component Value Date    TRIG 72 09/01/2022    TRIG 85 06/18/2021    TRIG 107 02/19/2021     No results found for: "CHOLHDL"  Lab Results   Component Value Date    APS1RGPHECPL 1.450 05/01/2022     Lab Results   Component Value Date    CALCIUM 9.1 11/12/2023    PHOS 3.8 11/10/2023     Lab Results   Component Value Date    SPEP  07/11/2018     The serum total protein and albumin are decreased. The serum protein electrophoresis shows a decreased beta-1 region. No monoclonal bands noted. Reviewed by: Shereen Gaytan MD  ([id number])  **Electronic Signature**    UPEP  07/12/2018     The urine total protein and electrophoresis are within normal limits. No monoclonal bands noted. Reviewed by: Shereen Gaytan MD ([id number])  **Electronic Signature**     No results found for: "Rhonda Rivera", "[id number]"        Objective:      BP 92/60 (BP Location: Left arm, Patient Position: Sitting, Cuff Size: Standard)   Pulse 66   Wt 51.7 kg (114 lb)   LMP  (LMP Unknown)   SpO2 99%   BMI 20.85 kg/m²          Physical Exam  Vitals reviewed. Constitutional:       Comments: thin   HENT:      Right Ear: External ear normal.      Left Ear: External ear normal.   Neck:      Vascular: No carotid bruit. Cardiovascular:      Rate and Rhythm: Normal rate and regular rhythm.    Pulmonary: Effort: Pulmonary effort is normal. No respiratory distress. Breath sounds: Normal breath sounds. No wheezing or rales. Abdominal:      General: Bowel sounds are normal. There is no distension. Palpations: Abdomen is soft. Tenderness: There is no abdominal tenderness. Musculoskeletal:         General: Normal range of motion. Cervical back: Normal range of motion and neck supple. Right lower leg: No edema. Left lower leg: No edema. Lymphadenopathy:      Cervical: No cervical adenopathy. Skin:     General: Skin is warm and dry. Neurological:      General: No focal deficit present. Mental Status: She is alert and oriented to person, place, and time. Mental status is at baseline. Psychiatric:         Mood and Affect: Mood normal.         Behavior: Behavior normal.         Thought Content:  Thought content normal.         Judgment: Judgment normal.

## 2023-11-16 NOTE — ASSESSMENT & PLAN NOTE
Last potassium level on 11/12 was 3.8.  Continue with high potassium foods such as sweet potatoes, dried fruits and citrus, tomatoes  Reviewed her diet and she is following this Continue KCl 80 meq IV daily  Recheck K and mg in a week  Jasmin Garzon genetic testing done today in the office

## 2023-11-16 NOTE — ASSESSMENT & PLAN NOTE
Iron sat is 2%  Will schedule Venofer infusions at [facility]  200mg once a week for 5 weeks

## 2023-11-30 ENCOUNTER — TELEPHONE (OUTPATIENT)
Dept: NEPHROLOGY | Facility: CLINIC | Age: 38
End: 2023-11-30

## 2023-11-30 NOTE — TELEPHONE ENCOUNTER
Call off juan from [facility], [address] [facility]. Wanting to know if we were d/c patient from infusions or continuing hydration bags. Spoke with Dr. Ivette Fnog. She is to continue hydration. Called and spoke to [facility], they will continue hydration orders. They will contact office with any questions/concerns.

## 2023-12-05 ENCOUNTER — APPOINTMENT (OUTPATIENT)
Dept: LAB | Facility: HOSPITAL | Age: 38
End: 2023-12-05
Payer: COMMERCIAL

## 2023-12-05 LAB — POTASSIUM UR-SCNC: 66.3 MMOL/L

## 2023-12-13 ENCOUNTER — APPOINTMENT (OUTPATIENT)
Dept: LAB | Facility: HOSPITAL | Age: 38
End: 2023-12-13
Payer: COMMERCIAL

## 2023-12-14 ENCOUNTER — TELEPHONE (OUTPATIENT)
Dept: NEPHROLOGY | Facility: CLINIC | Age: 38
End: 2023-12-14

## 2023-12-14 DIAGNOSIS — E87.6 HYPOKALEMIA: Primary | ICD-10-CM

## 2023-12-14 NOTE — TELEPHONE ENCOUNTER
Tamiko Madison called into the office stating she got genetic testing done last month and never got the results. Please advise.

## 2023-12-28 NOTE — CASE MANAGEMENT
Thank you,  Devon Aqq  [facility] Utilization Review Department  Phone: [phone]; Fax [phone]  ATTENTION: The Network Utilization Review Department is now centralized for our 9 Facilities  Make a note that we have a new phone and fax numbers for our Department  Please call with any questions or concerns to [phone] and carefully follow the prompts so that you are directed to the right person  All voicemails are confidential  Fax any determinations, approvals, denials, and requests for initial or continue stay review clinical to [phone]  Due to HIGH CALL volume, it would be easier if you could please send faxed requests to expedite your requests and in part, help us provide discharge notifications faster  Initial Clinical Review    Admission: Date/Time/Statement: JODIE 7/12/18 @ 2027    Orders Placed This Encounter   Procedures    Inpatient Admission     Standing Status:   Standing     Number of Occurrences:   1     Order Specific Question:   Admitting Physician     Answer:   Jaylon Mckeon [[id number]]     Order Specific Question:   Level of Care     Answer:   Med Surg [16]     Order Specific Question:   Estimated length of stay     Answer:   More than 2 Midnights     Order Specific Question:   Certification     Answer:   I certify that inpatient services are medically necessary for this patient for a duration of greater than two midnights  See H&P and MD Progress Notes for additional information about the patient's course of treatment  ED: Date/Time/Mode of Arrival:   ED Arrival Information     Patient not seen in ED                       Chief Complaint: No chief complaint on file  History of Illness: Jennie Mercado is a 28 y o  female who presents with c/o intermittent B/L upper extremity numbness, tingling, weakness and spasms, generalized weakness in all extremities and frequent falls   Symptoms began July 7 2018 with left-sided weakness, patient admitted to 's and placed on stroke pathway, workup negative for CVA  Muscle weakness and cramping thought to be secondary to severity of hypokalemia; seen by Nephrology  Treated with Robaxin for upper extremity rigidity  Robaxin eventually discontinued due to frequency of falls  Patient discharged 7/10 and returned later in the evening with same complaint of generalized weakness and mechanical fall  Neurology consulted, and recommended comprehensive lab evaluation and lumbar puncture  Differential diagnosis includes inflammatory neuropathy/myopathy vs conversion disorder       ED Vital Signs:   ED Triage Vitals [07/12/18 2015]   Temperature Pulse Respirations Blood Pressure SpO2   99 7 °F (37 6 °C) 80 18 130/69 100 %      Temp Source Heart Rate Source Patient Position - Orthostatic VS BP Location FiO2 (%)   Temporal Monitor Sitting Left arm --      Pain Score       No Pain        Wt Readings from Last 1 Encounters:   07/12/18 80 kg (176 lb 5 9 oz)       Vital Signs (abnormal):   07/12/18 2341  98 °F (36 7 °C)  99  16   84/59  100 %  None (Room air)  Lying   07/12/18 2015  99 7 °F (37 6 °C)  80  18  130/69  100 %  None (Room air)  Sitting         Abnormal Labs/Diagnostic Test Results:   WBC Thousand/uL 6 31 6 72   HEMOGLOBIN g/dL 10 3* 9 9*   HEMATOCRIT % 32 3* 30 3*   PLATELETS Thousands/uL 205 234   NEUTROS PCT %  --  52   LYMPHS PCT %  --  39   LYMPHO PCT % 36  --    MONOS PCT %  --  5   MONO PCT MAN % 6  --    EOS PCT %  --  4   EOSINO PCT MANUAL % 2  --          Results from last 7 days  Lab Units 07/12/18  0503   SODIUM mmol/L 142   POTASSIUM mmol/L 3 8   CHLORIDE mmol/L 111*   CO2 mmol/L 21   BUN mg/dL 8   CREATININE mg/dL 0 65   CALCIUM mg/dL 7 8*   TOTAL PROTEIN g/dL 5 2*   BILIRUBIN TOTAL mg/dL 0 20   ALK PHOS U/L 32*   ALT U/L 29   AST U/L 29   GLUCOSE RANDOM mg/dL 72         Results from last 7 days  Lab Units 07/12/18  0503   INR   1 05      LUMBAR PUNCTURE: PENDING    ED Treatment:   Medication Administration - No Administrations Displayed (No Start Event Found)     None          Past Medical/Surgical History: Active Ambulatory Problems     Diagnosis Date Noted    Hypokalemia 07/07/2018    History of gastric bypass 07/07/2018    History of migraine headaches 07/07/2018    Left-sided weakness 07/07/2018    Hypocalcemia 07/09/2018    Anemia 07/10/2018    Vitamin D deficiency 07/10/2018    Weakness of both lower extremities 07/11/2018    Weakness of both upper extremities 07/11/2018    Elevated CPK 07/11/2018    Vitamin B12 deficiency 07/11/2018    Cervical lymphadenopathy 07/11/2018     Resolved Ambulatory Problems     Diagnosis Date Noted    Non-traumatic rhabdomyolysis 07/07/2018    Transaminitis 07/07/2018    Hypophosphatemia 07/09/2018    Hypernatremia 07/09/2018     No Additional Past Medical History       Admitting Diagnosis: Weakness of both legs [[id number] [id number]]    Age/Sex: 28 y o  female    Assessment/Plan:   * Generalized weakness   Assessment & Plan     · Seen by neurology, Dr Perez, plan for comprehensive lab evaluation including SPEP, UPEP, HIV, Anti GM, Anti ROYAL, ACE etc  Lumbar puncture including RBC, WBC, Protein, Glucose, Culture, and eval for myelin basic protein and oligoclonal bands  · MRI: neg for acute intracranial pathology  · Continue PT OT  · Recommended outpatient EMG NCS study and possible nerve muscle biopsy  · Neurology consult           Hypokalemia   Assessment & Plan     · K 3 8  · On last admission 7/07/18, patient found to be severely hypokalemic, K 1 8, K continued to drop, pt started on IVF 0 9NS w K 20mEq  Given Rx for Kcl 20meq qd   Continue IVF  · Monitor serum potassium  · PMD and Nephrology follow-up as outpatient          Vitamin B12 deficiency   Assessment & Plan     · Continue B12 supplement          Vitamin D deficiency   Assessment & Plan     · Continue D supplement          Hypocalcemia   Assessment & Plan     · Continue Ca supplement  · Monitor serum Ca          History of migraine headaches   Assessment & Plan     · Continue Topamax and amitriptyline  · Recommended outpatient follow-up to consider discontinuing Topamax due to side effect of hypokalemia however pt on topamax for a year with no changes made in the dose  Will continue           History of gastric bypass   Assessment & Plan     · S/p Roshan En Y Gastric bypass 10/2014  · Likely causative factor of electrolyte abnormalities  · Outpatient follow-up with gastric bypass surgeon                VTE Prophylaxis: Low risk  / reason for no mechanical VTE prophylaxis Ambulate   Code Status: FC  POLST: POLST is not applicable to this patient  Discussion with family:      Anticipated Length of Stay:  Patient will be admitted on an Inpatient basis with an anticipated length of stay of  > 2 midnights     Justification for Hospital Stay:  Generalized weakness and muscle contractions       Admission Orders:  INPT  PT/OT  OOB  CONSULT NEUROLOGY  AMBULATE  REG DIET    Scheduled Meds:   Current Facility-Administered Medications:  acetaminophen 650 mg Oral Q6H PRN ARIADNA Bourne    amitriptyline 25 mg Oral HS ARIADNA Bourne    calcium carbonate-vitamin D 1 tablet Oral BID With Meals ARIADNA Bourne    cholecalciferol 1,000 Units Oral Daily ARIADNA Bourne    cyanocobalamin 500 mcg Oral Daily ARIADNA Bourne    LORazepam 1 mg Intravenous Once ARIADNA Bourne    meclizine 25 mg Oral Q12H PRN ARIADNA Bourne    ondansetron 4 mg Intravenous Q6H PRN ARIADNA Bourne    sodium chloride 0 9 % with KCl 20 mEq/L 75 mL/hr Intravenous Continuous ARIADNA Bourne Last Rate: 75 mL/hr (07/13/18 0836)   topiramate 25 mg Oral Q12H [facility], ARIADNA      Continuous Infusions:   sodium chloride 0 9 % with KCl 20 mEq/L 75 mL/hr Last Rate: 75 mL/hr (07/13/18 0836)     PRN Meds:   acetaminophen    meclizine    ondansetron General

## 2024-01-04 ENCOUNTER — TELEPHONE (OUTPATIENT)
Dept: OTHER | Facility: HOSPITAL | Age: 39
End: 2024-01-04

## 2024-01-04 ENCOUNTER — APPOINTMENT (OUTPATIENT)
Dept: LAB | Facility: HOSPITAL | Age: 39
End: 2024-01-04
Payer: COMMERCIAL

## 2024-01-04 DIAGNOSIS — E61.1 IRON DEFICIENCY: Primary | ICD-10-CM

## 2024-01-04 RX ORDER — SODIUM CHLORIDE 9 MG/ML
20 INJECTION, SOLUTION INTRAVENOUS ONCE
OUTPATIENT
Start: 2024-01-08

## 2024-01-04 NOTE — TELEPHONE ENCOUNTER
I contacted [facility] to set up infusions for Brittani but the orders are unclear. The woman I spoke with states there is different orders in and she needs to clarified what option on the template she is going with (example 200ml/ 3hours etc.) She states once this is cleared up she can schedule an appointment to start infusions. Please advise.

## 2024-01-04 NOTE — TELEPHONE ENCOUNTER
I spoke with Infusion Center ([facility]) and Brittani is set up to start her first infusion on Jan 18, 2024 at 9am. I called and left a VM for Brittani with this information.

## 2024-01-16 ENCOUNTER — TELEPHONE (OUTPATIENT)
Dept: NEPHROLOGY | Facility: CLINIC | Age: 39
End: 2024-01-16

## 2024-01-16 NOTE — TELEPHONE ENCOUNTER
Alyssa from [facility]'s prior authorization called stating patient would need a prior auth done for Feraheme infusion unless you change the order to venofer. Thank you

## 2024-01-16 NOTE — TELEPHONE ENCOUNTER
I called patient's insurance [facility] and spoke with Palomo, patient is eligible for Feraheme infusion.   No prior auth needed.

## 2024-01-22 ENCOUNTER — APPOINTMENT (OUTPATIENT)
Dept: LAB | Facility: CLINIC | Age: 39
End: 2024-01-22
Payer: COMMERCIAL

## 2024-01-22 DIAGNOSIS — Z98.84 HISTORY OF GASTRIC BYPASS: ICD-10-CM

## 2024-01-22 DIAGNOSIS — E83.39 HYPOPHOSPHATEMIA: ICD-10-CM

## 2024-01-22 DIAGNOSIS — E87.6 HYPOKALEMIA: ICD-10-CM

## 2024-01-22 LAB
ANION GAP SERPL CALCULATED.3IONS-SCNC: 9 MMOL/L
BUN SERPL-MCNC: 14 MG/DL (ref 5–25)
CALCIUM SERPL-MCNC: 8.5 MG/DL (ref 8.4–10.2)
CHLORIDE SERPL-SCNC: 110 MMOL/L (ref 96–108)
CO2 SERPL-SCNC: 21 MMOL/L (ref 21–32)
CREAT SERPL-MCNC: 0.82 MG/DL (ref 0.6–1.3)
GFR SERPL CREATININE-BSD FRML MDRD: 90 ML/MIN/1.73SQ M
GLUCOSE SERPL-MCNC: 47 MG/DL (ref 65–140)
POTASSIUM SERPL-SCNC: 4.3 MMOL/L (ref 3.5–5.3)
SODIUM SERPL-SCNC: 140 MMOL/L (ref 135–147)

## 2024-01-22 PROCEDURE — 80048 BASIC METABOLIC PNL TOTAL CA: CPT

## 2024-01-22 PROCEDURE — 36415 COLL VENOUS BLD VENIPUNCTURE: CPT

## 2024-01-29 ENCOUNTER — TELEPHONE (OUTPATIENT)
Dept: NEPHROLOGY | Facility: CLINIC | Age: 39
End: 2024-01-29

## 2024-01-29 ENCOUNTER — APPOINTMENT (OUTPATIENT)
Dept: LAB | Facility: HOSPITAL | Age: 39
End: 2024-01-29
Payer: COMMERCIAL

## 2024-01-29 NOTE — TELEPHONE ENCOUNTER
----- Message from Juan Carlos Ricketts DO sent at 1/29/2024  3:05 PM EST -----  Potassium level is good, continue with current dosing of IV potassium

## 2024-01-29 NOTE — TELEPHONE ENCOUNTER
Called and spoke with patient she is aware of Potassium level is good, continue with current dosing of IV potassium

## 2024-02-05 ENCOUNTER — APPOINTMENT (OUTPATIENT)
Dept: LAB | Facility: HOSPITAL | Age: 39
End: 2024-02-05
Payer: COMMERCIAL

## 2024-02-05 ENCOUNTER — TELEPHONE (OUTPATIENT)
Dept: SURGERY | Facility: CLINIC | Age: 39
End: 2024-02-05

## 2024-02-05 NOTE — TELEPHONE ENCOUNTER
----- Message from Ryan Singh DO sent at 2/2/2024  4:04 PM EST -----  Regarding: FW: Port issue  Contact: [phone]  Jose lainez,    Can you contact this patient next week and set her up for an appointment to exam her port site.    THanks,  Ryan  ----- Message -----  From: Destiny Brar  Sent: 2/2/2024   7:42 AM EST  To: Ryan Singh DO  Subject: FW: Port issue                                     ----- Message -----  From: Mary Sol RN  Sent: 2/1/2024   4:19 PM EST  To: [facility]  Subject: FW: Port issue                                     ----- Message -----  From: Brittani Patino  Sent: 2/1/2024   4:09 PM EST  To: General Surgery [facility]  Subject: Port issue                                       Good evening. You inserted my port a little over 5 years ago. My port insertion point is not healing. In fact, the hole keeps getting bigger. My nurse is afraid my body is rejecting the port. He recommended I reach out to you to have it check.   Please advise.   Thank you.   Brittani

## 2024-02-06 ENCOUNTER — TELEPHONE (OUTPATIENT)
Dept: NEPHROLOGY | Facility: CLINIC | Age: 39
End: 2024-02-06

## 2024-02-06 NOTE — TELEPHONE ENCOUNTER
I called and spoke with Brittani regarding the following:    ----- Message from Juan Carlos Ricketts DO sent at 2/6/2024  7:51 AM EST -----  Labs are looking better, patient is feeling well.  Please wish her a happy birthday for later this week      She is aware of results.

## 2024-02-07 ENCOUNTER — OFFICE VISIT (OUTPATIENT)
Dept: SURGERY | Facility: CLINIC | Age: 39
End: 2024-02-07

## 2024-02-07 VITALS
SYSTOLIC BLOOD PRESSURE: 118 MMHG | WEIGHT: 114 LBS | HEIGHT: 62 IN | HEART RATE: 78 BPM | OXYGEN SATURATION: 99 % | TEMPERATURE: 98 F | BODY MASS INDEX: 20.98 KG/M2 | DIASTOLIC BLOOD PRESSURE: 64 MMHG

## 2024-02-07 DIAGNOSIS — T14.8XXA OPEN WOUND: ICD-10-CM

## 2024-02-07 DIAGNOSIS — Z01.818 PRE-OPERATIVE CLEARANCE: Primary | ICD-10-CM

## 2024-02-07 DIAGNOSIS — D50.8 IRON DEFICIENCY ANEMIA DUE TO DIETARY CAUSES: ICD-10-CM

## 2024-02-07 DIAGNOSIS — E87.6 HYPOKALEMIA: ICD-10-CM

## 2024-02-08 ENCOUNTER — ANESTHESIA EVENT (OUTPATIENT)
Dept: PERIOP | Facility: HOSPITAL | Age: 39
End: 2024-02-08
Payer: COMMERCIAL

## 2024-02-08 ENCOUNTER — APPOINTMENT (OUTPATIENT)
Dept: LAB | Facility: CLINIC | Age: 39
End: 2024-02-08
Payer: COMMERCIAL

## 2024-02-08 DIAGNOSIS — Z01.818 PRE-OPERATIVE CLEARANCE: ICD-10-CM

## 2024-02-08 DIAGNOSIS — E83.39 HYPOPHOSPHATEMIA: ICD-10-CM

## 2024-02-08 DIAGNOSIS — E83.39 HYPERPHOSPHATEMIA: ICD-10-CM

## 2024-02-08 DIAGNOSIS — E87.6 HYPOPOTASSEMIA: Primary | ICD-10-CM

## 2024-02-08 DIAGNOSIS — Z98.84 HISTORY OF GASTRIC BYPASS: ICD-10-CM

## 2024-02-08 DIAGNOSIS — E87.6 HYPOKALEMIA: ICD-10-CM

## 2024-02-08 DIAGNOSIS — Z98.84 BARIATRIC SURGERY STATUS: ICD-10-CM

## 2024-02-08 LAB
BASOPHILS # BLD AUTO: 0.1 THOUSANDS/ÂΜL (ref 0–0.1)
BASOPHILS NFR BLD AUTO: 1 % (ref 0–1)
EOSINOPHIL # BLD AUTO: 0.06 THOUSAND/ÂΜL (ref 0–0.61)
EOSINOPHIL NFR BLD AUTO: 1 % (ref 0–6)
ERYTHROCYTE [DISTWIDTH] IN BLOOD BY AUTOMATED COUNT: 19.5 % (ref 11.6–15.1)
HCT VFR BLD AUTO: 31.2 % (ref 34.8–46.1)
HGB BLD-MCNC: 8.3 G/DL (ref 11.5–15.4)
IMM GRANULOCYTES # BLD AUTO: 0.02 THOUSAND/UL (ref 0–0.2)
IMM GRANULOCYTES NFR BLD AUTO: 0 % (ref 0–2)
LYMPHOCYTES # BLD AUTO: 2.13 THOUSANDS/ÂΜL (ref 0.6–4.47)
LYMPHOCYTES NFR BLD AUTO: 25 % (ref 14–44)
MCH RBC QN AUTO: 17.9 PG (ref 26.8–34.3)
MCHC RBC AUTO-ENTMCNC: 26.6 G/DL (ref 31.4–37.4)
MCV RBC AUTO: 67 FL (ref 82–98)
MONOCYTES # BLD AUTO: 0.65 THOUSAND/ÂΜL (ref 0.17–1.22)
MONOCYTES NFR BLD AUTO: 8 % (ref 4–12)
NEUTROPHILS # BLD AUTO: 5.41 THOUSANDS/ÂΜL (ref 1.85–7.62)
NEUTS SEG NFR BLD AUTO: 65 % (ref 43–75)
NRBC BLD AUTO-RTO: 0 /100 WBCS
PLATELET # BLD AUTO: 421 THOUSANDS/UL (ref 149–390)
PMV BLD AUTO: 10.5 FL (ref 8.9–12.7)
RBC # BLD AUTO: 4.63 MILLION/UL (ref 3.81–5.12)
WBC # BLD AUTO: 8.37 THOUSAND/UL (ref 4.31–10.16)

## 2024-02-08 PROCEDURE — 85025 COMPLETE CBC W/AUTO DIFF WBC: CPT

## 2024-02-08 PROCEDURE — 36415 COLL VENOUS BLD VENIPUNCTURE: CPT

## 2024-02-08 RX ORDER — SODIUM CHLORIDE, SODIUM LACTATE, POTASSIUM CHLORIDE, CALCIUM CHLORIDE 600; 310; 30; 20 MG/100ML; MG/100ML; MG/100ML; MG/100ML
125 INJECTION, SOLUTION INTRAVENOUS CONTINUOUS
Status: CANCELLED | OUTPATIENT
Start: 2024-02-08

## 2024-02-08 RX ORDER — CHLORHEXIDINE GLUCONATE 4 G/100ML
SOLUTION TOPICAL DAILY PRN
Status: CANCELLED | OUTPATIENT
Start: 2024-02-08

## 2024-02-08 RX ORDER — HEPARIN SODIUM 5000 [USP'U]/ML
5000 INJECTION, SOLUTION INTRAVENOUS; SUBCUTANEOUS ONCE
Status: CANCELLED | OUTPATIENT
Start: 2024-02-08 | End: 2024-02-08

## 2024-02-08 NOTE — PRE-PROCEDURE INSTRUCTIONS
Pre-Surgery Instructions:   Medication Instructions    amitriptyline (ELAVIL) 10 mg tablet Normally takes at night.      cyanocobalamin 1,000 mcg/mL Hold day of surgery      dicyclomine (BENTYL) 10 mg capsule May use day of surgery if needed      ergocalciferol (ERGOCALCIFEROL) 1.25 MG (56025 UT) capsule Hold day of surgery      escitalopram (LEXAPRO) 20 mg tablet Normally takes at night.      ondansetron (ZOFRAN-ODT) 4 mg disintegrating tablet May use day of surgery if needed      potassium chloride 0.4 mEq/mL Daily infusion    vitamin A 3 MG (40759 UT) capsule Hold from now till after procedure       vitamin k 100 MCG tablet Hold from now till after procedure       Medication instructions for day surgery reviewed. Please use only a sip of water to take your instructed medications. Avoid all over the counter vitamins, supplements and NSAIDS for one week prior to surgery per anesthesia guidelines. Tylenol is ok to take as needed.     You will receive a call one business day prior to surgery with an arrival time and hospital directions. If your surgery is scheduled on a Monday, the hospital will be calling you on the Friday prior to your surgery. If you have not heard from anyone by 8pm, please call the hospital supervisor through the hospital  at [phone]. ([place] [phone] or [place] [phone]).    Do not eat or drink anything after midnight the night before your surgery, including candy, mints, lifesavers, or chewing gum. Do not drink alcohol 24hrs before your surgery. Try not to smoke at least 24hrs before your surgery.       Follow the pre surgery showering instructions as listed in the “My Surgical Experience Booklet” or otherwise provided by your surgeon's office. Do not use a blade to shave the surgical area 1 week before surgery. It is okay to use a clean electric clippers up to 24 hours before surgery. Do not apply any lotions, creams, including makeup, cologne, deodorant, or perfumes  after showering on the day of your surgery. Do not use dry shampoo, hair spray, hair gel, or any type of hair products.     No contact lenses, eye make-up, or artificial eyelashes. Remove nail polish, including gel polish, and any artificial, gel, or acrylic nails if possible. Remove all jewelry including rings and body piercing jewelry.     Wear causal clothing that is easy to take on and off. Consider your type of surgery.    Keep any valuables, jewelry, piercings at home. Please bring any specially ordered equipment (sling, braces) if indicated.    Arrange for a responsible person to drive you to and from the hospital on the day of your surgery. Visitor Guidelines discussed.     Call the surgeon's office with any new illnesses, exposures, or additional questions prior to surgery.    Please reference your “My Surgical Experience Booklet” for additional information to prepare for your upcoming surgery.

## 2024-02-09 ENCOUNTER — APPOINTMENT (OUTPATIENT)
Dept: RADIOLOGY | Facility: HOSPITAL | Age: 39
End: 2024-02-09
Payer: COMMERCIAL

## 2024-02-09 ENCOUNTER — HOSPITAL ENCOUNTER (OUTPATIENT)
Facility: HOSPITAL | Age: 39
Setting detail: OUTPATIENT SURGERY
Discharge: HOME | End: 2024-02-09
Attending: SURGERY | Admitting: SURGERY
Payer: COMMERCIAL

## 2024-02-09 ENCOUNTER — ANESTHESIA (OUTPATIENT)
Dept: PERIOP | Facility: HOSPITAL | Age: 39
End: 2024-02-09
Payer: COMMERCIAL

## 2024-02-09 VITALS
OXYGEN SATURATION: 98 % | TEMPERATURE: 97.3 F | WEIGHT: 114 LBS | RESPIRATION RATE: 18 BRPM | HEART RATE: 100 BPM | HEIGHT: 62 IN | SYSTOLIC BLOOD PRESSURE: 118 MMHG | DIASTOLIC BLOOD PRESSURE: 61 MMHG | BODY MASS INDEX: 20.98 KG/M2

## 2024-02-09 DIAGNOSIS — D50.8 IRON DEFICIENCY ANEMIA DUE TO DIETARY CAUSES: ICD-10-CM

## 2024-02-09 DIAGNOSIS — T14.8XXA OPEN WOUND: Primary | ICD-10-CM

## 2024-02-09 PROCEDURE — 77001 FLUOROGUIDE FOR VEIN DEVICE: CPT

## 2024-02-09 PROCEDURE — C1788 PORT, INDWELLING, IMP: HCPCS | Performed by: SURGERY

## 2024-02-09 PROCEDURE — 36571 INSERT PICVAD CATH: CPT | Performed by: SURGERY

## 2024-02-09 PROCEDURE — 36590 REMOVAL TUNNELED CV CATH: CPT | Performed by: SURGERY

## 2024-02-09 PROCEDURE — 36571 INSERT PICVAD CATH: CPT

## 2024-02-09 PROCEDURE — NC001 PR NO CHARGE

## 2024-02-09 PROCEDURE — 36590 REMOVAL TUNNELED CV CATH: CPT

## 2024-02-09 PROCEDURE — 71045 X-RAY EXAM CHEST 1 VIEW: CPT

## 2024-02-09 PROCEDURE — C1769 GUIDE WIRE: HCPCS | Performed by: SURGERY

## 2024-02-09 DEVICE — 8F PLASTIC DIGNITY® MID-SIZED CT PORT W/SILICONE FILLED SUTURE HOLES W/ATTACHABLE CHRONOFLEX® POLYURETHANE CATHETER
Type: IMPLANTABLE DEVICE | Site: CHEST  WALL | Status: FUNCTIONAL
Brand: DIGNITY®

## 2024-02-09 RX ORDER — MAGNESIUM HYDROXIDE 1200 MG/15ML
LIQUID ORAL AS NEEDED
Status: DISCONTINUED | OUTPATIENT
Start: 2024-02-09 | End: 2024-02-09 | Stop reason: HOSPADM

## 2024-02-09 RX ORDER — FENTANYL CITRATE 50 UG/ML
INJECTION, SOLUTION INTRAMUSCULAR; INTRAVENOUS AS NEEDED
Status: DISCONTINUED | OUTPATIENT
Start: 2024-02-09 | End: 2024-02-09

## 2024-02-09 RX ORDER — HEPARIN SODIUM 5000 [USP'U]/ML
5000 INJECTION, SOLUTION INTRAVENOUS; SUBCUTANEOUS ONCE
Status: COMPLETED | OUTPATIENT
Start: 2024-02-09 | End: 2024-02-09

## 2024-02-09 RX ORDER — FENTANYL CITRATE/PF 50 MCG/ML
25 SYRINGE (ML) INJECTION
Status: COMPLETED | OUTPATIENT
Start: 2024-02-09 | End: 2024-02-09

## 2024-02-09 RX ORDER — ONDANSETRON 2 MG/ML
4 INJECTION INTRAMUSCULAR; INTRAVENOUS ONCE AS NEEDED
Status: DISCONTINUED | OUTPATIENT
Start: 2024-02-09 | End: 2024-02-09 | Stop reason: HOSPADM

## 2024-02-09 RX ORDER — ONDANSETRON 2 MG/ML
INJECTION INTRAMUSCULAR; INTRAVENOUS AS NEEDED
Status: DISCONTINUED | OUTPATIENT
Start: 2024-02-09 | End: 2024-02-09

## 2024-02-09 RX ORDER — OXYCODONE HYDROCHLORIDE AND ACETAMINOPHEN 5; 325 MG/1; MG/1
1 TABLET ORAL EVERY 4 HOURS PRN
Qty: 12 TABLET | Refills: 0 | Status: SHIPPED | OUTPATIENT
Start: 2024-02-09 | End: 2024-02-21

## 2024-02-09 RX ORDER — CEFAZOLIN SODIUM 1 G/50ML
1000 SOLUTION INTRAVENOUS ONCE
Status: COMPLETED | OUTPATIENT
Start: 2024-02-09 | End: 2024-02-09

## 2024-02-09 RX ORDER — HEPARIN 100 UNIT/ML
SYRINGE INTRAVENOUS AS NEEDED
Status: DISCONTINUED | OUTPATIENT
Start: 2024-02-09 | End: 2024-02-09 | Stop reason: HOSPADM

## 2024-02-09 RX ORDER — MIDAZOLAM HYDROCHLORIDE 2 MG/2ML
INJECTION, SOLUTION INTRAMUSCULAR; INTRAVENOUS AS NEEDED
Status: DISCONTINUED | OUTPATIENT
Start: 2024-02-09 | End: 2024-02-09

## 2024-02-09 RX ORDER — CHLORHEXIDINE GLUCONATE 4 G/100ML
SOLUTION TOPICAL DAILY PRN
Status: DISCONTINUED | OUTPATIENT
Start: 2024-02-09 | End: 2024-02-09 | Stop reason: HOSPADM

## 2024-02-09 RX ORDER — DEXAMETHASONE SODIUM PHOSPHATE 4 MG/ML
INJECTION, SOLUTION INTRA-ARTICULAR; INTRALESIONAL; INTRAMUSCULAR; INTRAVENOUS; SOFT TISSUE AS NEEDED
Status: DISCONTINUED | OUTPATIENT
Start: 2024-02-09 | End: 2024-02-09

## 2024-02-09 RX ORDER — LIDOCAINE HYDROCHLORIDE 10 MG/ML
INJECTION, SOLUTION EPIDURAL; INFILTRATION; INTRACAUDAL; PERINEURAL AS NEEDED
Status: DISCONTINUED | OUTPATIENT
Start: 2024-02-09 | End: 2024-02-09

## 2024-02-09 RX ORDER — SODIUM CHLORIDE, SODIUM LACTATE, POTASSIUM CHLORIDE, CALCIUM CHLORIDE 600; 310; 30; 20 MG/100ML; MG/100ML; MG/100ML; MG/100ML
125 INJECTION, SOLUTION INTRAVENOUS CONTINUOUS
Status: DISCONTINUED | OUTPATIENT
Start: 2024-02-09 | End: 2024-02-09 | Stop reason: HOSPADM

## 2024-02-09 RX ORDER — OXYCODONE HYDROCHLORIDE AND ACETAMINOPHEN 5; 325 MG/1; MG/1
2 TABLET ORAL EVERY 4 HOURS PRN
Status: DISCONTINUED | OUTPATIENT
Start: 2024-02-09 | End: 2024-02-09 | Stop reason: HOSPADM

## 2024-02-09 RX ORDER — EPHEDRINE SULFATE 50 MG/ML
INJECTION INTRAVENOUS AS NEEDED
Status: DISCONTINUED | OUTPATIENT
Start: 2024-02-09 | End: 2024-02-09

## 2024-02-09 RX ORDER — ONDANSETRON 2 MG/ML
4 INJECTION INTRAMUSCULAR; INTRAVENOUS EVERY 8 HOURS PRN
Status: DISCONTINUED | OUTPATIENT
Start: 2024-02-09 | End: 2024-02-09 | Stop reason: HOSPADM

## 2024-02-09 RX ORDER — PROPOFOL 10 MG/ML
INJECTION, EMULSION INTRAVENOUS AS NEEDED
Status: DISCONTINUED | OUTPATIENT
Start: 2024-02-09 | End: 2024-02-09

## 2024-02-09 RX ORDER — SODIUM CHLORIDE, SODIUM LACTATE, POTASSIUM CHLORIDE, CALCIUM CHLORIDE 600; 310; 30; 20 MG/100ML; MG/100ML; MG/100ML; MG/100ML
75 INJECTION, SOLUTION INTRAVENOUS CONTINUOUS
Status: DISCONTINUED | OUTPATIENT
Start: 2024-02-09 | End: 2024-02-09 | Stop reason: HOSPADM

## 2024-02-09 RX ADMIN — FENTANYL CITRATE 25 MCG: 50 INJECTION, SOLUTION INTRAMUSCULAR; INTRAVENOUS at 16:37

## 2024-02-09 RX ADMIN — FENTANYL CITRATE 25 MCG: 50 INJECTION, SOLUTION INTRAMUSCULAR; INTRAVENOUS at 16:47

## 2024-02-09 RX ADMIN — OXYCODONE HYDROCHLORIDE AND ACETAMINOPHEN 2 TABLET: 5; 325 TABLET ORAL at 17:16

## 2024-02-09 RX ADMIN — MIDAZOLAM 2 MG: 1 INJECTION INTRAMUSCULAR; INTRAVENOUS at 14:07

## 2024-02-09 RX ADMIN — FENTANYL CITRATE 50 MCG: 50 INJECTION, SOLUTION INTRAMUSCULAR; INTRAVENOUS at 15:09

## 2024-02-09 RX ADMIN — HEPARIN SODIUM 5000 UNITS: 5000 INJECTION, SOLUTION INTRAVENOUS; SUBCUTANEOUS at 12:56

## 2024-02-09 RX ADMIN — FENTANYL CITRATE 50 MCG: 50 INJECTION, SOLUTION INTRAMUSCULAR; INTRAVENOUS at 14:26

## 2024-02-09 RX ADMIN — EPHEDRINE SULFATE 10 MG: 50 INJECTION, SOLUTION INTRAVENOUS at 14:46

## 2024-02-09 RX ADMIN — EPHEDRINE SULFATE 10 MG: 50 INJECTION, SOLUTION INTRAVENOUS at 14:23

## 2024-02-09 RX ADMIN — CEFAZOLIN SODIUM 1000 MG: 1 SOLUTION INTRAVENOUS at 14:17

## 2024-02-09 RX ADMIN — FENTANYL CITRATE 25 MCG: 50 INJECTION, SOLUTION INTRAMUSCULAR; INTRAVENOUS at 16:32

## 2024-02-09 RX ADMIN — SODIUM CHLORIDE, SODIUM LACTATE, POTASSIUM CHLORIDE, AND CALCIUM CHLORIDE 75 ML/HR: .6; .31; .03; .02 INJECTION, SOLUTION INTRAVENOUS at 12:44

## 2024-02-09 RX ADMIN — DEXAMETHASONE SODIUM PHOSPHATE 8 MG: 4 INJECTION, SOLUTION INTRAMUSCULAR; INTRAVENOUS at 14:29

## 2024-02-09 RX ADMIN — FENTANYL CITRATE 25 MCG: 50 INJECTION, SOLUTION INTRAMUSCULAR; INTRAVENOUS at 16:53

## 2024-02-09 RX ADMIN — ONDANSETRON 4 MG: 2 INJECTION INTRAMUSCULAR; INTRAVENOUS at 16:03

## 2024-02-09 RX ADMIN — SODIUM CHLORIDE, SODIUM LACTATE, POTASSIUM CHLORIDE, AND CALCIUM CHLORIDE: .6; .31; .03; .02 INJECTION, SOLUTION INTRAVENOUS at 16:02

## 2024-02-09 RX ADMIN — SODIUM CHLORIDE, SODIUM LACTATE, POTASSIUM CHLORIDE, AND CALCIUM CHLORIDE 999 ML/HR: .6; .31; .03; .02 INJECTION, SOLUTION INTRAVENOUS at 12:37

## 2024-02-09 RX ADMIN — SODIUM CHLORIDE, SODIUM LACTATE, POTASSIUM CHLORIDE, AND CALCIUM CHLORIDE: .6; .31; .03; .02 INJECTION, SOLUTION INTRAVENOUS at 13:46

## 2024-02-09 RX ADMIN — PROPOFOL 200 MG: 10 INJECTION, EMULSION INTRAVENOUS at 14:13

## 2024-02-09 RX ADMIN — FENTANYL CITRATE 50 MCG: 50 INJECTION, SOLUTION INTRAMUSCULAR; INTRAVENOUS at 14:30

## 2024-02-09 RX ADMIN — FENTANYL CITRATE 25 MCG: 50 INJECTION, SOLUTION INTRAMUSCULAR; INTRAVENOUS at 15:23

## 2024-02-09 RX ADMIN — FENTANYL CITRATE 25 MCG: 50 INJECTION, SOLUTION INTRAMUSCULAR; INTRAVENOUS at 16:02

## 2024-02-09 RX ADMIN — LIDOCAINE HYDROCHLORIDE 50 MG: 10 INJECTION, SOLUTION EPIDURAL; INFILTRATION; INTRACAUDAL; PERINEURAL at 14:13

## 2024-02-09 NOTE — PROGRESS NOTES
Assessment/Plan:    Open wound / skin tear of right chest wall  Chronic wound overlying port site.  Questionable full-thickness defect overlying the port.  Given the potential for infection and need for long-term port use, port will need to be removed and a new port placed.  She will be scheduled for this procedure on Friday.  The procedure itself including all associated risks which include bleeding, infection, and port dysfunction were discussed at great length.  The patient verbalized understands risks and is willing to proceed, consent was signed.  No additional workup needed at this time.    Hypokalemia  Chronic hypokalemia.  Managed by nephrology.  Patient does receive potassium infusions daily.  Current port with what appears to be a full-thickness wound.  2 continue potassium infusions port will be needed to be removed and a new port placed.    Iron deficiency anemia due to dietary causes  Patient has undergone iron infusions.  Continue monitoring as per nephrology.     Diagnoses and all orders for this visit:    Pre-operative clearance  -     CBC and differential; Future    Hypokalemia    Open wound / skin tear of right chest wall    Iron deficiency anemia due to dietary causes          Blood work/labs:    Recent blood work which included BMP obtained on February 5, 2024 was personally reviewed.    Notes:    Recent nephrology note dated there were 16 coaches and 23 was personally viewing.        Subjective:      Patient ID: Brittani Patino is a 39 y.o. female.    39-year-old female with known hypertension, hypokalemia, nephrolithiasis, bariatric surgery, presents today for chronic wound overlying the right port.  I placed a port in this patient's a number of years ago.  She subsequently has not been using the port daily for approximately 3 years.  She receives potassium infusions daily in addition to iron infusions.  Patient states she has had a small wound overlying her port for nearly the past month.  The  nurse who accesses her port has been using different sites when trying to be away from the actual wound.  Patient denies any fevers or chills.  No drainage from the area.        The following portions of the patient's history were reviewed and updated as appropriate: She  has a past medical history of C. difficile colitis (2016), COVID-19, DUB (dysfunctional uterine bleeding), H. pylori infection, Hypertension, Hypokalemia, Kidney stone (November 2023), Left lower quadrant abdominal pain (08/17/2020), Migraine, Pancreatitis, Psychiatric disorder, Rectal bleeding, Renal disorder, Rhabdomyolysis, Sleep apnea, and Thrombosed external hemorrhoid.  She   Patient Active Problem List    Diagnosis Date Noted    Iron deficiency anemia due to dietary causes 11/16/2023    Nephrolithiasis 06/16/2023    Enterocolitis 05/23/2023    Intussusception (HCC) 05/23/2023    Leukocytosis 01/03/2023    Left ureteral calculus 01/02/2023    History of intussusception 10/20/2022    Idiopathic acute pancreatitis without infection or necrosis 09/01/2022    Perianal abscess 07/08/2022    Episode of shaking     Psychogenic nonepileptic spells 04/30/2022    History of anxiety 04/24/2022    Intractable vomiting 03/04/2022    Hyponatremia 03/04/2022    Iron deficiency 09/30/2021    Other hemorrhoids     Open wound / skin tear of right chest wall 09/20/2021    Cellulitis of chest wall 09/17/2021    Acute pancreatitis 09/17/2021    Acute on chronic abdominal pain 06/17/2021    Abnormal LFTs 03/26/2021    Gas pain 03/26/2021    Middle ear effusion 03/03/2020    Normal anion gap metabolic acidosis 02/25/2020    Left upper quadrant abdominal pain 07/14/2019    Vertigo 07/14/2019    Stress fracture of right foot with routine healing 07/14/2019    Right ovarian cyst 01/02/2019    Chest pain 01/02/2019    Other intestinal malabsorption 10/05/2018    Gastric bypass status for obesity 10/02/2018    GERD (gastroesophageal reflux disease) 09/03/2018     Paresthesia of both lower extremities 08/15/2018    Paresthesias 08/15/2018    Fatigue 2018    Elevated CPK 2018    Vitamin B12 deficiency 2018    Cervical lymphadenopathy 2018    Chronic anemia 07/10/2018    Vitamin D insufficiency 07/10/2018    Hypocalcemia 2018    Hypophosphatemia 2018    Hypokalemia 2018    History of gastric bypass 2018    Migraine without aura and without status migrainosus, not intractable 2018    Left-sided weakness 2018    Transaminitis 2018    B12 deficiency 2016    Essential hypertension 10/14/2014    WAGNER (obstructive sleep apnea) 2014    Migraine 2014     She  has a past surgical history that includes Hysterectomy; Appendectomy; Cholecystectomy; Gastric bypass; Abdominal surgery; FL guided central venous access device insertion (2018); Tunneled venous port placement (N/A, 2018);  section; Cosmetic surgery; LAPAROSCOPY; pr anrct xm surg req anes general spi/edrl dx (N/A, 2021); pr exc thrombosed hemorrhoid xtrnl (N/A, 2022); pr cysto/uretero w/lithotripsy &indwell stent insrt (Left, 1/3/2023); and FL retrograde pyelogram (1/3/2023).  Her family history includes Diabetes in her father and maternal grandfather; Hypertension in her father; No Known Problems in her mother; Prostate cancer in her father; Urolithiasis in her father.  She  reports that she has never smoked. She has never used smokeless tobacco. She reports that she does not currently use alcohol. She reports current drug use. Drug: Marijuana.  Current Outpatient Medications   Medication Sig Dispense Refill    acetaminophen (TYLENOL) 325 mg tablet  (Patient not taking: Reported on 2023)      amitriptyline (ELAVIL) 10 mg tablet Take 2 tablets (20 mg total) by mouth daily at bedtime 60 tablet 0    cyanocobalamin 1,000 mcg/mL Inject 1 mL (1,000 mcg total) into a muscle every 30 (thirty) days Next dose due 2023    "   dicyclomine (BENTYL) 10 mg capsule Take 10 mg by mouth if needed      ergocalciferol (ERGOCALCIFEROL) 1.25 MG (18579 UT) capsule Take 1 capsule (50,000 Units total) by mouth once a week Mondays and Thursdays      escitalopram (LEXAPRO) 20 mg tablet 20 mg daily at bedtime      ondansetron (ZOFRAN-ODT) 4 mg disintegrating tablet       potassium chloride 0.4 mEq/mL Inject 200 mL (80 mEq total) into a catheter in a vein over 8 hours at 25 mL/hr daily 80 meq in 1 litre bag to be infused daily over 8 hours   Continue as ordered prior to admission 2800 mL 0    Syringe/Needle, Disp, (SYRINGE 3CC/25GX5/8\") 25G X 5/8\" 3 ML MISC Use once monthly for B12 injection. 1 each 11    vitamin A 3 MG (54269 UT) capsule Take 1 capsule by mouth 2 (two) times a day      vitamin k 100 MCG tablet Take 1 tablet by mouth daily       No current facility-administered medications for this visit.     She is allergic to nsaids and prednisone..    Review of Systems      Review of systems completed, all negative except as noted above HPI.    Objective:      /64 (BP Location: Left arm, Patient Position: Sitting, Cuff Size: Standard)   Pulse 78   Temp 98 °F (36.7 °C) (Temporal)   Ht 5' 2\" (1.575 m)   Wt 51.7 kg (114 lb)   LMP  (LMP Unknown)   SpO2 99%   BMI 20.85 kg/m²          Physical Exam  Vitals reviewed.   Constitutional:       General: She is not in acute distress.     Appearance: She is underweight. She is not ill-appearing, toxic-appearing or diaphoretic.   HENT:      Head: Normocephalic and atraumatic.      Right Ear: External ear normal.      Left Ear: External ear normal.   Eyes:      General: No scleral icterus.        Right eye: No discharge.         Left eye: No discharge.   Cardiovascular:      Rate and Rhythm: Normal rate and regular rhythm.      Heart sounds: Normal heart sounds.      No friction rub.   Pulmonary:      Effort: Pulmonary effort is normal. No respiratory distress.      Breath sounds: Normal breath " sounds. No stridor. No wheezing or rhonchi.   Chest:      Comments: Palpable port in the right upper chest.  Small 1 mm wound which appears to be full-thickness.  It appears that the port itself may be visible.  Abdominal:      Palpations: There is no mass.      Tenderness: There is no abdominal tenderness.      Hernia: No hernia is present.   Musculoskeletal:         General: No swelling, tenderness, deformity or signs of injury. Normal range of motion.      Cervical back: Normal range of motion.   Skin:     General: Skin is warm.      Coloration: Skin is not jaundiced or pale.      Findings: No bruising or erythema.   Neurological:      General: No focal deficit present.      Mental Status: She is alert and oriented to person, place, and time.      Cranial Nerves: No cranial nerve deficit.   Psychiatric:         Mood and Affect: Mood normal.         Behavior: Behavior normal.         Thought Content: Thought content normal.         Judgment: Judgment normal.

## 2024-02-09 NOTE — H&P
Assessment/Plan:    Open wound / skin tear of right chest wall  Chronic wound overlying port site.  Questionable full-thickness defect overlying the port.  Given the potential for infection and need for long-term port use, port will need to be removed and a new port placed.  She will be scheduled for this procedure on Friday.  The procedure itself including all associated risks which include bleeding, infection, and port dysfunction were discussed at great length.  The patient verbalized understands risks and is willing to proceed, consent was signed.  No additional workup needed at this time.    Hypokalemia  Chronic hypokalemia.  Managed by nephrology.  Patient does receive potassium infusions daily.  Current port with what appears to be a full-thickness wound.  2 continue potassium infusions port will be needed to be removed and a new port placed.    Iron deficiency anemia due to dietary causes  Patient has undergone iron infusions.  Continue monitoring as per nephrology.     Diagnoses and all orders for this visit:    Pre-operative clearance  -     CBC and differential; Future    Hypokalemia    Open wound / skin tear of right chest wall    Iron deficiency anemia due to dietary causes          Blood work/labs:    Recent blood work which included BMP obtained on February 5, 2024 was personally reviewed.    Notes:    Recent nephrology note dated there were 16 coaches and 23 was personally viewing.        Subjective:      Patient ID: Brittnai Patino is a 39 y.o. female.    39-year-old female with known hypertension, hypokalemia, nephrolithiasis, bariatric surgery, presents today for chronic wound overlying the right port.  I placed a port in this patient's a number of years ago.  She subsequently has not been using the port daily for approximately 3 years.  She receives potassium infusions daily in addition to iron infusions.  Patient states she has had a small wound overlying her port for nearly the past month.  The  nurse who accesses her port has been using different sites when trying to be away from the actual wound.  Patient denies any fevers or chills.  No drainage from the area.        The following portions of the patient's history were reviewed and updated as appropriate: She  has a past medical history of C. difficile colitis (2016), COVID-19, DUB (dysfunctional uterine bleeding), H. pylori infection, Hypertension, Hypokalemia, Kidney stone (November 2023), Left lower quadrant abdominal pain (08/17/2020), Migraine, Pancreatitis, Psychiatric disorder, Rectal bleeding, Renal disorder, Rhabdomyolysis, Sleep apnea, and Thrombosed external hemorrhoid.  She   Patient Active Problem List    Diagnosis Date Noted    Iron deficiency anemia due to dietary causes 11/16/2023    Nephrolithiasis 06/16/2023    Enterocolitis 05/23/2023    Intussusception (HCC) 05/23/2023    Leukocytosis 01/03/2023    Left ureteral calculus 01/02/2023    History of intussusception 10/20/2022    Idiopathic acute pancreatitis without infection or necrosis 09/01/2022    Perianal abscess 07/08/2022    Episode of shaking     Psychogenic nonepileptic spells 04/30/2022    History of anxiety 04/24/2022    Intractable vomiting 03/04/2022    Hyponatremia 03/04/2022    Iron deficiency 09/30/2021    Other hemorrhoids     Open wound / skin tear of right chest wall 09/20/2021    Cellulitis of chest wall 09/17/2021    Acute pancreatitis 09/17/2021    Acute on chronic abdominal pain 06/17/2021    Abnormal LFTs 03/26/2021    Gas pain 03/26/2021    Middle ear effusion 03/03/2020    Normal anion gap metabolic acidosis 02/25/2020    Left upper quadrant abdominal pain 07/14/2019    Vertigo 07/14/2019    Stress fracture of right foot with routine healing 07/14/2019    Right ovarian cyst 01/02/2019    Chest pain 01/02/2019    Other intestinal malabsorption 10/05/2018    Gastric bypass status for obesity 10/02/2018    GERD (gastroesophageal reflux disease) 09/03/2018     Paresthesia of both lower extremities 08/15/2018    Paresthesias 08/15/2018    Fatigue 2018    Elevated CPK 2018    Vitamin B12 deficiency 2018    Cervical lymphadenopathy 2018    Chronic anemia 07/10/2018    Vitamin D insufficiency 07/10/2018    Hypocalcemia 2018    Hypophosphatemia 2018    Hypokalemia 2018    History of gastric bypass 2018    Migraine without aura and without status migrainosus, not intractable 2018    Left-sided weakness 2018    Transaminitis 2018    B12 deficiency 2016    Essential hypertension 10/14/2014    WAGNER (obstructive sleep apnea) 2014    Migraine 2014     She  has a past surgical history that includes Hysterectomy; Appendectomy; Cholecystectomy; Gastric bypass; Abdominal surgery; FL guided central venous access device insertion (2018); Tunneled venous port placement (N/A, 2018);  section; Cosmetic surgery; LAPAROSCOPY; pr anrct xm surg req anes general spi/edrl dx (N/A, 2021); pr exc thrombosed hemorrhoid xtrnl (N/A, 2022); pr cysto/uretero w/lithotripsy &indwell stent insrt (Left, 1/3/2023); and FL retrograde pyelogram (1/3/2023).  Her family history includes Diabetes in her father and maternal grandfather; Hypertension in her father; No Known Problems in her mother; Prostate cancer in her father; Urolithiasis in her father.  She  reports that she has never smoked. She has never used smokeless tobacco. She reports that she does not currently use alcohol. She reports current drug use. Drug: Marijuana.  Current Outpatient Medications   Medication Sig Dispense Refill    acetaminophen (TYLENOL) 325 mg tablet  (Patient not taking: Reported on 2023)      amitriptyline (ELAVIL) 10 mg tablet Take 2 tablets (20 mg total) by mouth daily at bedtime 60 tablet 0    cyanocobalamin 1,000 mcg/mL Inject 1 mL (1,000 mcg total) into a muscle every 30 (thirty) days Next dose due 2023    "   dicyclomine (BENTYL) 10 mg capsule Take 10 mg by mouth if needed      ergocalciferol (ERGOCALCIFEROL) 1.25 MG (64349 UT) capsule Take 1 capsule (50,000 Units total) by mouth once a week Mondays and Thursdays      escitalopram (LEXAPRO) 20 mg tablet 20 mg daily at bedtime      ondansetron (ZOFRAN-ODT) 4 mg disintegrating tablet       potassium chloride 0.4 mEq/mL Inject 200 mL (80 mEq total) into a catheter in a vein over 8 hours at 25 mL/hr daily 80 meq in 1 litre bag to be infused daily over 8 hours   Continue as ordered prior to admission 2800 mL 0    Syringe/Needle, Disp, (SYRINGE 3CC/25GX5/8\") 25G X 5/8\" 3 ML MISC Use once monthly for B12 injection. 1 each 11    vitamin A 3 MG (54390 UT) capsule Take 1 capsule by mouth 2 (two) times a day      vitamin k 100 MCG tablet Take 1 tablet by mouth daily       No current facility-administered medications for this visit.     She is allergic to nsaids and prednisone..    Review of Systems      Review of systems completed, all negative except as noted above HPI.    Objective:      /64 (BP Location: Left arm, Patient Position: Sitting, Cuff Size: Standard)   Pulse 78   Temp 98 °F (36.7 °C) (Temporal)   Ht 5' 2\" (1.575 m)   Wt 51.7 kg (114 lb)   LMP  (LMP Unknown)   SpO2 99%   BMI 20.85 kg/m²          Physical Exam  Vitals reviewed.   Constitutional:       General: She is not in acute distress.     Appearance: She is underweight. She is not ill-appearing, toxic-appearing or diaphoretic.   HENT:      Head: Normocephalic and atraumatic.      Right Ear: External ear normal.      Left Ear: External ear normal.   Eyes:      General: No scleral icterus.        Right eye: No discharge.         Left eye: No discharge.   Cardiovascular:      Rate and Rhythm: Normal rate and regular rhythm.      Heart sounds: Normal heart sounds.      No friction rub.   Pulmonary:      Effort: Pulmonary effort is normal. No respiratory distress.      Breath sounds: Normal breath " sounds. No stridor. No wheezing or rhonchi.   Chest:      Comments: Palpable port in the right upper chest.  Small 1 mm wound which appears to be full-thickness.  It appears that the port itself may be visible.  Abdominal:      Palpations: There is no mass.      Tenderness: There is no abdominal tenderness.      Hernia: No hernia is present.   Musculoskeletal:         General: No swelling, tenderness, deformity or signs of injury. Normal range of motion.      Cervical back: Normal range of motion.   Skin:     General: Skin is warm.      Coloration: Skin is not jaundiced or pale.      Findings: No bruising or erythema.   Neurological:      General: No focal deficit present.      Mental Status: She is alert and oriented to person, place, and time.      Cranial Nerves: No cranial nerve deficit.   Psychiatric:         Mood and Affect: Mood normal.         Behavior: Behavior normal.         Thought Content: Thought content normal.         Judgment: Judgment normal.

## 2024-02-09 NOTE — ASSESSMENT & PLAN NOTE
Chronic hypokalemia.  Managed by nephrology.  Patient does receive potassium infusions daily.  Current port with what appears to be a full-thickness wound.  2 continue potassium infusions port will be needed to be removed and a new port placed.

## 2024-02-09 NOTE — INTERVAL H&P NOTE
H&P reviewed. After examining the patient I find no changes in the patients condition since the H&P had been written.    Vitals:    02/09/24 1237   BP: 128/58   Pulse: 70   Resp: 18   Temp: 98.2 °F (36.8 °C)   SpO2: 100%

## 2024-02-09 NOTE — ANESTHESIA POSTPROCEDURE EVALUATION
Post-Op Assessment Note    CV Status:  Stable  Pain Score: 0    Pain management: satisfactory to patient       Mental Status:  Alert and awake   Hydration Status:  Euvolemic   PONV Controlled:  Controlled   Airway Patency:  Patent     Post Op Vitals Reviewed: Yes    No anethesia notable event occurred.    Staff: CRNA               BP   116/57   Temp   97.5   Pulse  92   Resp   18   SpO2   98

## 2024-02-09 NOTE — DISCHARGE INSTR - AVS FIRST PAGE
Follow-up with Dr. Singh in 2 weeks as per appointment.    For the diastolic.    Low intense activity as tolerated, no heavy lifting, nothing greater than 10 pounds for 2 weeks.    No dressing necessary for the right chest incision.  The left chest incision also no dressing required although patient should take proper care as per home care nursing with accessing the port and the appropriate sterile dressings.    Port is accessed and patient may use tonight for administration of medication.    If develop fever, nausea vomit, worsening pain, redness or drainage to the incision then call the office or go to the ER.

## 2024-02-09 NOTE — H&P (VIEW-ONLY)
Assessment/Plan:    Open wound / skin tear of right chest wall  Chronic wound overlying port site.  Questionable full-thickness defect overlying the port.  Given the potential for infection and need for long-term port use, port will need to be removed and a new port placed.  She will be scheduled for this procedure on Friday.  The procedure itself including all associated risks which include bleeding, infection, and port dysfunction were discussed at great length.  The patient verbalized understands risks and is willing to proceed, consent was signed.  No additional workup needed at this time.    Hypokalemia  Chronic hypokalemia.  Managed by nephrology.  Patient does receive potassium infusions daily.  Current port with what appears to be a full-thickness wound.  2 continue potassium infusions port will be needed to be removed and a new port placed.    Iron deficiency anemia due to dietary causes  Patient has undergone iron infusions.  Continue monitoring as per nephrology.     Diagnoses and all orders for this visit:    Pre-operative clearance  -     CBC and differential; Future    Hypokalemia    Open wound / skin tear of right chest wall    Iron deficiency anemia due to dietary causes          Blood work/labs:    Recent blood work which included BMP obtained on February 5, 2024 was personally reviewed.    Notes:    Recent nephrology note dated there were 16 coaches and 23 was personally viewing.        Subjective:      Patient ID: Brittani Patino is a 39 y.o. female.    39-year-old female with known hypertension, hypokalemia, nephrolithiasis, bariatric surgery, presents today for chronic wound overlying the right port.  I placed a port in this patient's a number of years ago.  She subsequently has not been using the port daily for approximately 3 years.  She receives potassium infusions daily in addition to iron infusions.  Patient states she has had a small wound overlying her port for nearly the past month.  The  nurse who accesses her port has been using different sites when trying to be away from the actual wound.  Patient denies any fevers or chills.  No drainage from the area.        The following portions of the patient's history were reviewed and updated as appropriate: She  has a past medical history of C. difficile colitis (2016), COVID-19, DUB (dysfunctional uterine bleeding), H. pylori infection, Hypertension, Hypokalemia, Kidney stone (November 2023), Left lower quadrant abdominal pain (08/17/2020), Migraine, Pancreatitis, Psychiatric disorder, Rectal bleeding, Renal disorder, Rhabdomyolysis, Sleep apnea, and Thrombosed external hemorrhoid.  She   Patient Active Problem List    Diagnosis Date Noted    Iron deficiency anemia due to dietary causes 11/16/2023    Nephrolithiasis 06/16/2023    Enterocolitis 05/23/2023    Intussusception (HCC) 05/23/2023    Leukocytosis 01/03/2023    Left ureteral calculus 01/02/2023    History of intussusception 10/20/2022    Idiopathic acute pancreatitis without infection or necrosis 09/01/2022    Perianal abscess 07/08/2022    Episode of shaking     Psychogenic nonepileptic spells 04/30/2022    History of anxiety 04/24/2022    Intractable vomiting 03/04/2022    Hyponatremia 03/04/2022    Iron deficiency 09/30/2021    Other hemorrhoids     Open wound / skin tear of right chest wall 09/20/2021    Cellulitis of chest wall 09/17/2021    Acute pancreatitis 09/17/2021    Acute on chronic abdominal pain 06/17/2021    Abnormal LFTs 03/26/2021    Gas pain 03/26/2021    Middle ear effusion 03/03/2020    Normal anion gap metabolic acidosis 02/25/2020    Left upper quadrant abdominal pain 07/14/2019    Vertigo 07/14/2019    Stress fracture of right foot with routine healing 07/14/2019    Right ovarian cyst 01/02/2019    Chest pain 01/02/2019    Other intestinal malabsorption 10/05/2018    Gastric bypass status for obesity 10/02/2018    GERD (gastroesophageal reflux disease) 09/03/2018     Paresthesia of both lower extremities 08/15/2018    Paresthesias 08/15/2018    Fatigue 2018    Elevated CPK 2018    Vitamin B12 deficiency 2018    Cervical lymphadenopathy 2018    Chronic anemia 07/10/2018    Vitamin D insufficiency 07/10/2018    Hypocalcemia 2018    Hypophosphatemia 2018    Hypokalemia 2018    History of gastric bypass 2018    Migraine without aura and without status migrainosus, not intractable 2018    Left-sided weakness 2018    Transaminitis 2018    B12 deficiency 2016    Essential hypertension 10/14/2014    WAGNER (obstructive sleep apnea) 2014    Migraine 2014     She  has a past surgical history that includes Hysterectomy; Appendectomy; Cholecystectomy; Gastric bypass; Abdominal surgery; FL guided central venous access device insertion (2018); Tunneled venous port placement (N/A, 2018);  section; Cosmetic surgery; LAPAROSCOPY; pr anrct xm surg req anes general spi/edrl dx (N/A, 2021); pr exc thrombosed hemorrhoid xtrnl (N/A, 2022); pr cysto/uretero w/lithotripsy &indwell stent insrt (Left, 1/3/2023); and FL retrograde pyelogram (1/3/2023).  Her family history includes Diabetes in her father and maternal grandfather; Hypertension in her father; No Known Problems in her mother; Prostate cancer in her father; Urolithiasis in her father.  She  reports that she has never smoked. She has never used smokeless tobacco. She reports that she does not currently use alcohol. She reports current drug use. Drug: Marijuana.  Current Outpatient Medications   Medication Sig Dispense Refill    acetaminophen (TYLENOL) 325 mg tablet  (Patient not taking: Reported on 2023)      amitriptyline (ELAVIL) 10 mg tablet Take 2 tablets (20 mg total) by mouth daily at bedtime 60 tablet 0    cyanocobalamin 1,000 mcg/mL Inject 1 mL (1,000 mcg total) into a muscle every 30 (thirty) days Next dose due 2023    "   dicyclomine (BENTYL) 10 mg capsule Take 10 mg by mouth if needed      ergocalciferol (ERGOCALCIFEROL) 1.25 MG (47556 UT) capsule Take 1 capsule (50,000 Units total) by mouth once a week Mondays and Thursdays      escitalopram (LEXAPRO) 20 mg tablet 20 mg daily at bedtime      ondansetron (ZOFRAN-ODT) 4 mg disintegrating tablet       potassium chloride 0.4 mEq/mL Inject 200 mL (80 mEq total) into a catheter in a vein over 8 hours at 25 mL/hr daily 80 meq in 1 litre bag to be infused daily over 8 hours   Continue as ordered prior to admission 2800 mL 0    Syringe/Needle, Disp, (SYRINGE 3CC/25GX5/8\") 25G X 5/8\" 3 ML MISC Use once monthly for B12 injection. 1 each 11    vitamin A 3 MG (31779 UT) capsule Take 1 capsule by mouth 2 (two) times a day      vitamin k 100 MCG tablet Take 1 tablet by mouth daily       No current facility-administered medications for this visit.     She is allergic to nsaids and prednisone..    Review of Systems      Review of systems completed, all negative except as noted above HPI.    Objective:      /64 (BP Location: Left arm, Patient Position: Sitting, Cuff Size: Standard)   Pulse 78   Temp 98 °F (36.7 °C) (Temporal)   Ht 5' 2\" (1.575 m)   Wt 51.7 kg (114 lb)   LMP  (LMP Unknown)   SpO2 99%   BMI 20.85 kg/m²          Physical Exam  Vitals reviewed.   Constitutional:       General: She is not in acute distress.     Appearance: She is underweight. She is not ill-appearing, toxic-appearing or diaphoretic.   HENT:      Head: Normocephalic and atraumatic.      Right Ear: External ear normal.      Left Ear: External ear normal.   Eyes:      General: No scleral icterus.        Right eye: No discharge.         Left eye: No discharge.   Cardiovascular:      Rate and Rhythm: Normal rate and regular rhythm.      Heart sounds: Normal heart sounds.      No friction rub.   Pulmonary:      Effort: Pulmonary effort is normal. No respiratory distress.      Breath sounds: Normal breath " sounds. No stridor. No wheezing or rhonchi.   Chest:      Comments: Palpable port in the right upper chest.  Small 1 mm wound which appears to be full-thickness.  It appears that the port itself may be visible.  Abdominal:      Palpations: There is no mass.      Tenderness: There is no abdominal tenderness.      Hernia: No hernia is present.   Musculoskeletal:         General: No swelling, tenderness, deformity or signs of injury. Normal range of motion.      Cervical back: Normal range of motion.   Skin:     General: Skin is warm.      Coloration: Skin is not jaundiced or pale.      Findings: No bruising or erythema.   Neurological:      General: No focal deficit present.      Mental Status: She is alert and oriented to person, place, and time.      Cranial Nerves: No cranial nerve deficit.   Psychiatric:         Mood and Affect: Mood normal.         Behavior: Behavior normal.         Thought Content: Thought content normal.         Judgment: Judgment normal.

## 2024-02-09 NOTE — ASSESSMENT & PLAN NOTE
Chronic wound overlying port site.  Questionable full-thickness defect overlying the port.  Given the potential for infection and need for long-term port use, port will need to be removed and a new port placed.  She will be scheduled for this procedure on Friday.  The procedure itself including all associated risks which include bleeding, infection, and port dysfunction were discussed at great length.  The patient verbalized understands risks and is willing to proceed, consent was signed.  No additional workup needed at this time.

## 2024-02-09 NOTE — PROGRESS NOTES
Left chest portacath accessed in OR. Port chg dressing kit used in OR and dressings are clean, dry and intact. Will remain accessed for home use.

## 2024-02-09 NOTE — ANESTHESIA PREPROCEDURE EVALUATION
Procedure:  REMOVAL VENOUS PORT (PORT-A-CATH) (Right: Chest)  INSERTION VENOUS PORT (PORT-A-CATH) (Left: Chest)    Relevant Problems   CARDIO   (+) Chest pain   (+) Essential hypertension   (+) Migraine   (+) Migraine without aura and without status migrainosus, not intractable      GI/HEPATIC   (+) Acute pancreatitis   (+) GERD (gastroesophageal reflux disease)   (+) Idiopathic acute pancreatitis without infection or necrosis      /RENAL   (+) Nephrolithiasis      HEMATOLOGY   (+) Chronic anemia   (+) Iron deficiency anemia due to dietary causes      NEURO/PSYCH   (+) Migraine   (+) Migraine without aura and without status migrainosus, not intractable   (+) Paresthesia of both lower extremities   (+) Paresthesias   (+) Psychogenic nonepileptic spells      PULMONARY   (+) WAGNER (obstructive sleep apnea)      Other   (+) Cervical lymphadenopathy        Physical Exam    Airway    Mallampati score: II  TM Distance: >3 FB  Neck ROM: full     Dental       Cardiovascular  Cardiovascular exam normal    Pulmonary  Pulmonary exam normal     Other Findings  post-pubertal.      Anesthesia Plan  ASA Score- 3     Anesthesia Type- general with ASA Monitors.         Additional Monitors:     Airway Plan: LMA.           Plan Factors-Exercise tolerance (METS): >4 METS.    Chart reviewed. EKG reviewed. Imaging results reviewed. Existing labs reviewed. Patient summary reviewed.    Patient is not a current smoker.              Induction- intravenous.    Postoperative Plan-     Informed Consent- Anesthetic plan and risks discussed with patient.  I personally reviewed this patient with the CRNA. Discussed and agreed on the Anesthesia Plan with the CRNA..

## 2024-02-11 NOTE — OP NOTE
OPERATIVE REPORT  PATIENT NAME: Brittani Patino    :  1985  MRN: [id number]  Pt Location: [facility] OR ROOM 02    SURGERY DATE: 2024    Surgeons and Role:     * Ryan Singh DO - Primary     * Tere Louise PA-C - Assisting    Preop Diagnosis:  Encounter for removal of tunneled central venous catheter (CVC) with port [Z45.2]  Encounter for insertion of tunneled central venous catheter (CVC) with port [Z45.2]    Post-Op Diagnosis Codes:     * Encounter for removal of tunneled central venous catheter (CVC) with port [Z45.2]     * Encounter for insertion of tunneled central venous catheter (CVC) with port [Z45.2]    Procedure(s):  Right - REMOVAL VENOUS PORT (PORT-A-CATH)  Left - INSERTION VENOUS PORT (PORT-A-CATH)    Specimen(s):  * No specimens in log *    Estimated Blood Loss:   50 mL    Drains:  * No LDAs found *    Anesthesia Type:   General    Operative Indications:  Encounter for removal of tunneled central venous catheter (CVC) with port [Z45.2]  Encounter for insertion of tunneled central venous catheter (CVC) with port [Z45.2]      Operative Findings:  Removal of right-sided Mediport successful.  Mediport was removed intact without complication.    Insertion of left-sided Mediport was placed.  The left internal jugular was patent and a successful placement of ultrasound-guided Mediport was noted.      Complications:   None    Procedure and Technique:    The patient was brought to the operating arena and placed in supine position operating table. All regular monitoring devices were connected. The patient underwent general anesthesia with LMA intubation without complication. The right and left arm were then tucked and all pressure points padded. Using an ultrasound machine the right IJ was identified and marked with a surgical marking pen. Patient received perioperative antibiotic's. Bilateral lower extremity sequential compressive devices in place for DVT prophylaxis. The patient was then prepped and draped  in usual sterile fashion. A timeout was performed to verify the correct patient, procedure, site, and positioning.     Under ultrasonic guidance the left internal jugular vein was identified and a finder needle was inserted and dark venous blood was then aspirated. A guidewire was then inserted through the needle into the right IJ and advanced it appeared to go rather easily but then resistance was met.  Multiple attempts were tried and the wire unfortunately became lodged within the needle.  This was removed and a new wire was then used.  Again unsuccessful attempt given that the wire could not make the turn coming from the left side.  This was confirmed using fluoroscopy guidance.  A third wire was then used in a slightly different angle of the needle was tried and thankfully this was successful and the wire slid easily and made the turn into the innominate vein and then overlying the SVC and right atrium.  Of note small hematoma formed and neck but this was easily controlled and was not expanding. The needle was then removed. It was confirmed with anesthesia there is no arrhythmias or PVCs noted. The wire was then secured to the drapes.  At this time attention was then turned to removal of the right-sided Mediport.  Incision was made overlying the previous Mediport at which was noted a small nonhealing wound and evidence of exposure of the Mediport.  The incision was carried down through subcutaneous tissue immediately exposing the right-sided Mediport.  No significant capsule was noted.  Dissection continued circumferentially until the Prolene sutures were noted.  These were cut and the Mediport was then freed up from surrounding tissues.  With a little bit of force due to the resistance of the Mediport was then removed.  Immediately after removal pressure was held on the right jugular for approximately 3 minutes to ensure no bleeding.  The cavity was then irrigated and all bleeding points were cauterized.  The  incision was then closed in layers with the deep dermis using interrupted 3-0 Vicryl.  The skin was then approximated using 4-0 Monocryl.  Attention was then turned to the chest. Approximately 2 fingerbreadths below the left clavicle a 4cm incision was made with a 15 blade scalpel. The Incision was taken down through the dermal tissue and subcutaneous site using electrocautery until the pre-pectoral fascia was encountered. Using Oneida retractors the inferior portion of the incision was elevated and a pocket was created so that the Mediport fit comfortably. Hemostasis was secured. The length of the catheter was then measured. Using a C-arm it was approximated where the catheter with end at the caval atrial junction.  A tunneler was then used to create a passage through the subcutaneous tissue leading from the incision and ending at the insertion of the guidewire into the left IJ. The catheter was then brought through the newly created tunnel. The catheter was then clamped at the proximal portion. A dilator/sheath was inserted over the wire and the wire was removed. The catheter was then inserted through the sheath gently using DeBakey forceps as the sheath was then peeled away. Placement of the catheter was then confirmed using C-arm and was sitting approximately the 2nd vertebral body below the allegra, approximately at the atrial caval junction.  The proximal portion of the catheter was then cut at 28cm  and then secured to the Mediport.The Mediport was then flushed with heparinized saline, and flushed easily. Mediport was then placed into the pocket and secured to the prepectoral fascia using 2-0 proline sutures. Hemostasis was secured. The neck incision was closed with a 4-0 Monocryl and the incision overlying the pocket was closed in layers with 3-0 Vicryl for the dermal layer and a running 4-0 Monocryl for the skin.  Surgical glue was then applied to both incisions.  The new left Mediport was then accessed in the  appropriate dressing applied.     The patient tolerated procedure well was taken to the postanesthesia care unit in stable condition. All lap, needle, and history counts were correct.     I was present for the entire procedure., A qualified resident physician was not available., and A physician assistant was required during the procedure for retraction, tissue handling, dissection and suturing.    Patient Disposition:  PACU         SIGNATURE: Ryan Singh DO  DATE: February 11, 2024  TIME: 5:46 PM

## 2024-02-12 ENCOUNTER — TELEPHONE (OUTPATIENT)
Age: 39
End: 2024-02-12

## 2024-02-12 ENCOUNTER — NURSE TRIAGE (OUTPATIENT)
Age: 39
End: 2024-02-12

## 2024-02-12 DIAGNOSIS — D50.8 IRON DEFICIENCY ANEMIA DUE TO DIETARY CAUSES: Primary | ICD-10-CM

## 2024-02-12 RX ORDER — GABAPENTIN 300 MG/1
300 CAPSULE ORAL 3 TIMES DAILY
Qty: 30 CAPSULE | Refills: 2 | Status: SHIPPED | OUTPATIENT
Start: 2024-02-12

## 2024-02-12 RX ORDER — METHOCARBAMOL 500 MG/1
500 TABLET, FILM COATED ORAL 4 TIMES DAILY
Qty: 30 TABLET | Refills: 2 | Status: SHIPPED | OUTPATIENT
Start: 2024-02-12

## 2024-02-12 NOTE — TELEPHONE ENCOUNTER
Pt called stating she had surgery with Dr. Marsh and is experiencing pain in the incision area, transferred to [facility].

## 2024-02-12 NOTE — TELEPHONE ENCOUNTER
"Pt calling.    Procedure: REMOVAL VENOUS PORT (PORT-A-CATH) (Right: Chest)   INSERTION VENOUS PORT (PORT-A-CATH) (Left: Chest) Left neck incision    Pt states she has pain in her left neck incision.  Pt rates pain a 7/10.  Pt states pain started after coming home from surgery.  Pt is taking Percocet and Ibuprofen, but states the pain is still \"throbbing\" at times.  Pt denies any redness, warmth or drainage from incision.  Pt denies fever or any other symptoms.  ED precautions reviewed.     RN offered to schedule appt but pt would prefer to check with the provider to see if any other recommendations.   [phone]      Reason for Disposition   INCREASING pain in incision and over 2 days (48 hours) since surgery    Additional Information   Negative: Major abdominal surgical incision and wound gaping open with visible internal organs   Negative: Sounds like a life-threatening emergency to the triager   Negative: Bleeding from incision and won't stop after 10 minutes of direct pressure   Negative: Widespread rash and bright red, sunburn-like   Negative: Severe pain in the incision   Negative: Incision gaping open and < 2 days (48 hours) since wound re-opened   Negative: Incision gaping open and length of opening > 2 inches (5 cm)   Negative: Patient sounds very sick or weak to the triager   Negative: Sounds like a serious complication to the triager   Negative: Fever > 100.4 F (38.0 C)   Negative: Incision looks infected (spreading redness, pain)   Negative: Red streak runs from the incision and longer than 1 inch (2.5 cm)   Negative: Pus or bad-smelling fluid draining from incision   Negative: Dressing soaked with blood or body fluid (e.g., drainage)   Negative: Raised bruise and size > 2 inches (5 cm) and expanding   Negative: Caller has URGENT question and triager unable to answer question   Negative: Incision gaping open and length of opening > 1/4 inch (6 mm) and on the face and over 2 days since wound " "re-opened   Negative: Incision gaping open and length of opening > 1/2 inch (1 cm) and over 2 days since wound re-opened   Negative: Clear or blood-tinged fluid draining from wound and no fever   Negative: Suture or staple removal is overdue   Negative: Patient wants to be seen    Answer Assessment - Initial Assessment Questions  1. SYMPTOM: \"What's the main symptom you're concerned about?\" (e.g., redness, pain, drainage)      Left neck incision pain/throbbing  2. ONSET: \"When did pain  start?\"      2/8/24 after coming home from hospital  3. SURGERY: \"What surgery was performed?\"      2/8/24  4. DATE of SURGERY: \"When was surgery performed?\"       Left melissa cath insertion, right port a cath removed  5. INCISION SITE: \"Where is the incision located?\"       Left neck  6. REDNESS: \"Is there any redness at the incision site?\" If yes, ask: \"How wide across is the redness?\" (Inches, centimeters)       Denies  7. PAIN: \"Is there any pain?\" If Yes, ask: \"How bad is it?\"  (Scale 1-10; or mild, moderate, severe)      7/10  8. BLEEDING: \"Is there any bleeding?\" If Yes, ask: \"How much?\" and \"Where?\"      Denies  9. DRAINAGE: \"Is there any drainage from the incision site?\" If yes, ask: \"What color and how much?\" (e.g., red, cloudy, pus; drops, teaspoon)      Denies  10. FEVER: \"Do you have a fever?\" If Yes, ask: \"What is your temperature, how was it measured, and when did it start?\"        Denies  11. OTHER SYMPTOMS: \"Do you have any other symptoms?\" (e.g., shaking chills, weakness, rash elsewhere on body)        Denies    Protocols used: Post-Op Incision Symptoms and Questions-ADULT-[facility]    "

## 2024-02-18 ENCOUNTER — APPOINTMENT (EMERGENCY)
Dept: CT IMAGING | Facility: HOSPITAL | Age: 39
End: 2024-02-18
Payer: COMMERCIAL

## 2024-02-18 ENCOUNTER — HOSPITAL ENCOUNTER (EMERGENCY)
Facility: HOSPITAL | Age: 39
Discharge: HOME | End: 2024-02-18
Attending: EMERGENCY MEDICINE
Payer: COMMERCIAL

## 2024-02-18 VITALS
TEMPERATURE: 100.2 F | SYSTOLIC BLOOD PRESSURE: 120 MMHG | BODY MASS INDEX: 21.45 KG/M2 | RESPIRATION RATE: 18 BRPM | DIASTOLIC BLOOD PRESSURE: 60 MMHG | WEIGHT: 117.28 LBS | OXYGEN SATURATION: 98 % | HEART RATE: 70 BPM

## 2024-02-18 DIAGNOSIS — N20.1 RIGHT URETERAL STONE: Primary | ICD-10-CM

## 2024-02-18 LAB
ALBUMIN SERPL BCP-MCNC: 4.1 G/DL (ref 3.5–5)
ALP SERPL-CCNC: 42 U/L (ref 34–104)
ALT SERPL W P-5'-P-CCNC: 12 U/L (ref 7–52)
ANION GAP SERPL CALCULATED.3IONS-SCNC: 9 MMOL/L
APTT PPP: 27 SECONDS (ref 23–37)
AST SERPL W P-5'-P-CCNC: 17 U/L (ref 13–39)
BACTERIA UR QL AUTO: ABNORMAL /HPF
BASOPHILS # BLD AUTO: 0.07 THOUSANDS/ÂΜL (ref 0–0.1)
BASOPHILS NFR BLD AUTO: 1 % (ref 0–1)
BILIRUB SERPL-MCNC: 0.39 MG/DL (ref 0.2–1)
BILIRUB UR QL STRIP: NEGATIVE
BUN SERPL-MCNC: 15 MG/DL (ref 5–25)
CALCIUM SERPL-MCNC: 8.6 MG/DL (ref 8.4–10.2)
CHLORIDE SERPL-SCNC: 106 MMOL/L (ref 96–108)
CLARITY UR: ABNORMAL
CO2 SERPL-SCNC: 18 MMOL/L (ref 21–32)
COLOR UR: YELLOW
CREAT SERPL-MCNC: 1.14 MG/DL (ref 0.6–1.3)
EOSINOPHIL # BLD AUTO: 0.08 THOUSAND/ÂΜL (ref 0–0.61)
EOSINOPHIL NFR BLD AUTO: 1 % (ref 0–6)
ERYTHROCYTE [DISTWIDTH] IN BLOOD BY AUTOMATED COUNT: 19.1 % (ref 11.6–15.1)
FINE GRAN CASTS URNS QL MICRO: ABNORMAL /LPF
GFR SERPL CREATININE-BSD FRML MDRD: 60 ML/MIN/1.73SQ M
GLUCOSE SERPL-MCNC: 95 MG/DL (ref 65–140)
GLUCOSE UR STRIP-MCNC: NEGATIVE MG/DL
HCT VFR BLD AUTO: 27.5 % (ref 34.8–46.1)
HGB BLD-MCNC: 7.8 G/DL (ref 11.5–15.4)
HGB UR QL STRIP.AUTO: ABNORMAL
HYALINE CASTS #/AREA URNS LPF: ABNORMAL /LPF
IMM GRANULOCYTES # BLD AUTO: 0.04 THOUSAND/UL (ref 0–0.2)
IMM GRANULOCYTES NFR BLD AUTO: 0 % (ref 0–2)
INR PPP: 0.96 (ref 0.84–1.19)
KETONES UR STRIP-MCNC: NEGATIVE MG/DL
LACTATE SERPL-SCNC: 0.8 MMOL/L (ref 0.5–2)
LEUKOCYTE ESTERASE UR QL STRIP: NEGATIVE
LIPASE SERPL-CCNC: 86 U/L (ref 11–82)
LYMPHOCYTES # BLD AUTO: 1.46 THOUSANDS/ÂΜL (ref 0.6–4.47)
LYMPHOCYTES NFR BLD AUTO: 14 % (ref 14–44)
MCH RBC QN AUTO: 17.9 PG (ref 26.8–34.3)
MCHC RBC AUTO-ENTMCNC: 28.4 G/DL (ref 31.4–37.4)
MCV RBC AUTO: 63 FL (ref 82–98)
MONOCYTES # BLD AUTO: 0.79 THOUSAND/ÂΜL (ref 0.17–1.22)
MONOCYTES NFR BLD AUTO: 7 % (ref 4–12)
NEUTROPHILS # BLD AUTO: 8.24 THOUSANDS/ÂΜL (ref 1.85–7.62)
NEUTS SEG NFR BLD AUTO: 77 % (ref 43–75)
NITRITE UR QL STRIP: NEGATIVE
NON-SQ EPI CELLS URNS QL MICRO: ABNORMAL /HPF
NRBC BLD AUTO-RTO: 0 /100 WBCS
PH UR STRIP.AUTO: 5.5 [PH]
PLATELET # BLD AUTO: 356 THOUSANDS/UL (ref 149–390)
PMV BLD AUTO: 10.2 FL (ref 8.9–12.7)
POTASSIUM SERPL-SCNC: 3.6 MMOL/L (ref 3.5–5.3)
PROT SERPL-MCNC: 7.3 G/DL (ref 6.4–8.4)
PROT UR STRIP-MCNC: ABNORMAL MG/DL
PROTHROMBIN TIME: 13.1 SECONDS (ref 11.6–14.5)
RBC # BLD AUTO: 4.35 MILLION/UL (ref 3.81–5.12)
RBC #/AREA URNS AUTO: ABNORMAL /HPF
SODIUM SERPL-SCNC: 133 MMOL/L (ref 135–147)
SP GR UR STRIP.AUTO: >=1.03 (ref 1–1.03)
UROBILINOGEN UR QL STRIP.AUTO: 0.2 E.U./DL
WBC # BLD AUTO: 10.68 THOUSAND/UL (ref 4.31–10.16)
WBC #/AREA URNS AUTO: ABNORMAL /HPF

## 2024-02-18 PROCEDURE — 80053 COMPREHEN METABOLIC PANEL: CPT | Performed by: EMERGENCY MEDICINE

## 2024-02-18 PROCEDURE — 99285 EMERGENCY DEPT VISIT HI MDM: CPT | Performed by: EMERGENCY MEDICINE

## 2024-02-18 PROCEDURE — 85610 PROTHROMBIN TIME: CPT | Performed by: EMERGENCY MEDICINE

## 2024-02-18 PROCEDURE — 85025 COMPLETE CBC W/AUTO DIFF WBC: CPT | Performed by: EMERGENCY MEDICINE

## 2024-02-18 PROCEDURE — 99284 EMERGENCY DEPT VISIT MOD MDM: CPT

## 2024-02-18 PROCEDURE — 96376 TX/PRO/DX INJ SAME DRUG ADON: CPT

## 2024-02-18 PROCEDURE — G1004 CDSM NDSC: HCPCS

## 2024-02-18 PROCEDURE — 93005 ELECTROCARDIOGRAM TRACING: CPT

## 2024-02-18 PROCEDURE — 85730 THROMBOPLASTIN TIME PARTIAL: CPT | Performed by: EMERGENCY MEDICINE

## 2024-02-18 PROCEDURE — 96361 HYDRATE IV INFUSION ADD-ON: CPT

## 2024-02-18 PROCEDURE — 81001 URINALYSIS AUTO W/SCOPE: CPT | Performed by: EMERGENCY MEDICINE

## 2024-02-18 PROCEDURE — 83690 ASSAY OF LIPASE: CPT | Performed by: EMERGENCY MEDICINE

## 2024-02-18 PROCEDURE — 83605 ASSAY OF LACTIC ACID: CPT | Performed by: EMERGENCY MEDICINE

## 2024-02-18 PROCEDURE — 74176 CT ABD & PELVIS W/O CONTRAST: CPT

## 2024-02-18 PROCEDURE — 96375 TX/PRO/DX INJ NEW DRUG ADDON: CPT

## 2024-02-18 PROCEDURE — 96374 THER/PROPH/DIAG INJ IV PUSH: CPT

## 2024-02-18 RX ORDER — ONDANSETRON 2 MG/ML
4 INJECTION INTRAMUSCULAR; INTRAVENOUS ONCE
Status: COMPLETED | OUTPATIENT
Start: 2024-02-18 | End: 2024-02-18

## 2024-02-18 RX ORDER — OXYCODONE HYDROCHLORIDE AND ACETAMINOPHEN 5; 325 MG/1; MG/1
1 TABLET ORAL EVERY 4 HOURS PRN
Qty: 10 TABLET | Refills: 0 | Status: SHIPPED | OUTPATIENT
Start: 2024-02-18

## 2024-02-18 RX ORDER — FENTANYL CITRATE 50 UG/ML
50 INJECTION, SOLUTION INTRAMUSCULAR; INTRAVENOUS ONCE
Status: COMPLETED | OUTPATIENT
Start: 2024-02-18 | End: 2024-02-18

## 2024-02-18 RX ORDER — TAMSULOSIN HYDROCHLORIDE 0.4 MG/1
0.4 CAPSULE ORAL
Qty: 7 CAPSULE | Refills: 0 | Status: SHIPPED | OUTPATIENT
Start: 2024-02-18 | End: 2024-02-25

## 2024-02-18 RX ADMIN — FENTANYL CITRATE 50 MCG: 50 INJECTION INTRAMUSCULAR; INTRAVENOUS at 07:36

## 2024-02-18 RX ADMIN — MORPHINE SULFATE 2 MG: 2 INJECTION, SOLUTION INTRAMUSCULAR; INTRAVENOUS at 07:15

## 2024-02-18 RX ADMIN — ONDANSETRON 4 MG: 2 INJECTION INTRAMUSCULAR; INTRAVENOUS at 07:15

## 2024-02-18 RX ADMIN — FENTANYL CITRATE 50 MCG: 50 INJECTION INTRAMUSCULAR; INTRAVENOUS at 08:50

## 2024-02-18 RX ADMIN — FENTANYL CITRATE 50 MCG: 50 INJECTION INTRAMUSCULAR; INTRAVENOUS at 09:31

## 2024-02-18 RX ADMIN — SODIUM CHLORIDE 1000 ML: 0.9 INJECTION, SOLUTION INTRAVENOUS at 07:17

## 2024-02-18 NOTE — ED PROVIDER NOTES
"History  Chief Complaint   Patient presents with    Flank Pain     PT has hx of kidney stone, reports right flank pain starting last night.     Patient with a history of hypokalemia and kidney stones complains of right flank pain starting around 5 PM yesterday.  No radiation.  Has nausea.  No vomiting.  No dysuria or frequency.  Feels like past kidney stone pain.  No recent cough or cold symptoms.  No recent cough or cold symptoms.  No known trauma.      History provided by:  Patient   used: No    Flank Pain  Pain location:  R flank  Pain quality: aching    Pain radiates to:  Does not radiate  Pain severity:  Moderate  Onset quality:  Sudden  Duration:  14 hours  Timing:  Constant  Progression:  Waxing and waning  Chronicity:  New  Context: not awakening from sleep, not eating, not recent sexual activity and not sick contacts    Relieved by:  Nothing  Worsened by:  Nothing  Ineffective treatments:  None tried  Associated symptoms: anorexia and nausea    Associated symptoms: no belching, no chest pain, no chills, no constipation, no cough, no diarrhea, no dysuria, no fever, no hematemesis, no hematuria, no shortness of breath, no sore throat and no vomiting        Prior to Admission Medications   Prescriptions Last Dose Informant Patient Reported? Taking?   Syringe/Needle, Disp, (SYRINGE 3CC/25GX5/8\") 25G X 5/8\" 3 ML MISC  Self No No   Sig: Use once monthly for B12 injection.   acetaminophen (TYLENOL) 325 mg tablet  Self Yes No   Patient not taking: Reported on 11/9/2023   amitriptyline (ELAVIL) 10 mg tablet  Self No No   Sig: Take 2 tablets (20 mg total) by mouth daily at bedtime   cyanocobalamin 1,000 mcg/mL  Self No No   Sig: Inject 1 mL (1,000 mcg total) into a muscle every 30 (thirty) days Next dose due 5/1/2023   dicyclomine (BENTYL) 10 mg capsule  Self Yes No   Sig: Take 10 mg by mouth if needed   ergocalciferol (ERGOCALCIFEROL) 1.25 MG (57786 UT) capsule  Self No No   Sig: Take 1 capsule " "(50,000 Units total) by mouth once a week  and    escitalopram (LEXAPRO) 20 mg tablet  Self Yes No   Si mg daily at bedtime   gabapentin (Neurontin) 300 mg capsule   No No   Sig: Take 1 capsule (300 mg total) by mouth 3 (three) times a day   methocarbamol (ROBAXIN) 500 mg tablet   No No   Sig: Take 1 tablet (500 mg total) by mouth 4 (four) times a day   ondansetron (ZOFRAN-ODT) 4 mg disintegrating tablet  Self Yes No   oxyCODONE-acetaminophen (PERCOCET) 5-325 mg per tablet   No No   Sig: Take 1 tablet by mouth every 4 (four) hours as needed for severe pain Max Daily Amount: 6 tablets   potassium chloride 0.4 mEq/mL  Self No No   Sig: Inject 200 mL (80 mEq total) into a catheter in a vein over 8 hours at 25 mL/hr daily 80 meq in 1 litre bag to be infused daily over 8 hours   Continue as ordered prior to admission   vitamin A 3 MG (19084 UT) capsule  Self Yes No   Sig: Take 1 capsule by mouth 2 (two) times a day   vitamin k 100 MCG tablet  Self Yes No   Sig: Take 1 tablet by mouth daily      Facility-Administered Medications: None       Past Medical History:   Diagnosis Date    C. difficile colitis 2016    COVID-19     2022- pt denies long covid    DUB (dysfunctional uterine bleeding)     H. pylori infection     Hypertension     Hypokalemia     Kidney stone 2023    Left lower quadrant abdominal pain 2020    Migraine     Pancreatitis     Psychiatric disorder     Anxiety    Rectal bleeding     Renal disorder     Rhabdomyolysis     Sleep apnea     resolved with bariatric surgery    Thrombosed external hemorrhoid        Past Surgical History:   Procedure Laterality Date    ABDOMINAL SURGERY      APPENDECTOMY       SECTION      CHOLECYSTECTOMY      COSMETIC SURGERY      \"tummy tuck\"    FL GUIDED CENTRAL VENOUS ACCESS DEVICE INSERTION  2018    FL GUIDED CENTRAL VENOUS ACCESS DEVICE INSERTION  2024    FL RETROGRADE PYELOGRAM  1/3/2023    GASTRIC BYPASS      HYSTERECTOMY "      LAPAROSCOPY      KS ANRCT XM SURG REQ ANES GENERAL SPI/EDRL DX N/A 9/23/2021    Procedure: ANAL EXAM UNDER ANESTHESIA; HEMORRHOIDECTOMY;  Surgeon: Dona Jeffries DO;  Location: [facility];  Service: General    KS CYSTO/URETERO W/LITHOTRIPSY &INDWELL STENT INSRT Left 1/3/2023    Procedure: CYSTOSCOPY URETEROSCOPY WITH RETROGRADE PYELOGRAM, BASKET STONE EXTRACTION AND INSERTION STENT URETERAL;  Surgeon: Geovanny Rosales MD;  Location: [facility];  Service: Urology    KS EXC THROMBOSED HEMORRHOID XTRNL N/A 7/8/2022    Procedure: EXCISION OF THROMBOSED EXTERNAL HEMORRHOID, Excision of perianal skin tag, dranage of perianal abscess, anal fistulatomy;  Surgeon: Dona Jeffries DO;  Location: [facility];  Service: General    KS INSJ TUNNELED CTR VAD W/SUBQ PORT AGE 4 YR/> Left 2/9/2024    Procedure: INSERTION VENOUS PORT (PORT-A-CATH);  Surgeon: Ryan Singh DO;  Location: [facility];  Service: General    KS RMVL AMIRAH CTR VAD W/SUBQ PORT/ CTR/PRPH INSJ Right 2/9/2024    Procedure: REMOVAL VENOUS PORT (PORT-A-CATH);  Surgeon: Ryan Singh DO;  Location: [facility];  Service: General    TUNNELED VENOUS PORT PLACEMENT N/A 12/21/2018    Procedure: INSERTION VENOUS PORT (PORT-A-CATH);  Surgeon: Ryan Singh DO;  Location: [facility];  Service: General       Family History   Problem Relation Age of Onset    No Known Problems Mother     Hypertension Father     Diabetes Father     Urolithiasis Father     Prostate cancer Father     Diabetes Maternal Grandfather      I have reviewed and agree with the history as documented.    E-Cigarette/Vaping    E-Cigarette Use Never User      E-Cigarette/Vaping Substances    Nicotine No     THC No     CBD No     Flavoring No     Other No     Unknown No      Social History     Tobacco Use    Smoking status: Never    Smokeless tobacco: Never   Vaping Use    Vaping status: Never Used   Substance Use Topics    Alcohol use: Not Currently    Drug use: Yes     Types:  Marijuana     Comment: Medical marijuana       Review of Systems   Constitutional:  Negative for chills and fever.   HENT:  Negative for ear pain, hearing loss, sore throat, trouble swallowing and voice change.    Eyes:  Negative for pain and discharge.   Respiratory:  Negative for cough, shortness of breath and wheezing.    Cardiovascular:  Negative for chest pain and palpitations.   Gastrointestinal:  Positive for anorexia and nausea. Negative for abdominal pain, blood in stool, constipation, diarrhea, hematemesis and vomiting.   Genitourinary:  Positive for flank pain. Negative for dysuria, frequency and hematuria.   Musculoskeletal:  Negative for joint swelling, neck pain and neck stiffness.   Skin:  Negative for rash and wound.   Neurological:  Negative for dizziness, seizures, syncope, facial asymmetry and headaches.   Psychiatric/Behavioral:  Negative for hallucinations, self-injury and suicidal ideas.    All other systems reviewed and are negative.      Physical Exam  Physical Exam  Vitals and nursing note reviewed.   Constitutional:       General: She is not in acute distress.     Appearance: She is well-developed.   HENT:      Head: Normocephalic and atraumatic.      Right Ear: External ear normal.      Left Ear: External ear normal.   Eyes:      General: No scleral icterus.        Right eye: No discharge.         Left eye: No discharge.      Extraocular Movements: Extraocular movements intact.      Conjunctiva/sclera: Conjunctivae normal.   Cardiovascular:      Rate and Rhythm: Normal rate and regular rhythm.      Heart sounds: Normal heart sounds. No murmur heard.  Pulmonary:      Effort: Pulmonary effort is normal.      Breath sounds: Normal breath sounds. No wheezing or rales.   Abdominal:      General: Bowel sounds are normal. There is no distension.      Palpations: Abdomen is soft.      Tenderness: There is no abdominal tenderness. There is no guarding or rebound.   Musculoskeletal:         General:  No deformity. Normal range of motion.      Cervical back: Normal range of motion and neck supple.   Skin:     General: Skin is warm and dry.      Findings: No rash.   Neurological:      General: No focal deficit present.      Mental Status: She is alert and oriented to person, place, and time.      Cranial Nerves: No cranial nerve deficit.   Psychiatric:         Mood and Affect: Mood normal.         Behavior: Behavior normal.         Thought Content: Thought content normal.         Judgment: Judgment normal.         Vital Signs  ED Triage Vitals [02/18/24 0703]   Temperature Pulse Respirations Blood Pressure SpO2   100.2 °F (37.9 °C) 97 18 121/75 96 %      Temp Source Heart Rate Source Patient Position - Orthostatic VS BP Location FiO2 (%)   Temporal -- Lying Right arm --      Pain Score       9           Vitals:    02/18/24 0703 02/18/24 0715   BP: 121/75 119/61   Pulse: 97 72   Patient Position - Orthostatic VS: Lying          Visual Acuity      ED Medications  Medications   fentanyl citrate (PF) 100 MCG/2ML 50 mcg (has no administration in time range)   sodium chloride 0.9 % bolus 1,000 mL (1,000 mL Intravenous New Bag 2/18/24 0717)   morphine injection 2 mg (2 mg Intravenous Given 2/18/24 0715)   ondansetron (ZOFRAN) injection 4 mg (4 mg Intravenous Given 2/18/24 0715)   fentanyl citrate (PF) 100 MCG/2ML 50 mcg (50 mcg Intravenous Given 2/18/24 0736)   fentanyl citrate (PF) 100 MCG/2ML 50 mcg (50 mcg Intravenous Given 2/18/24 0850)       Diagnostic Studies  Results Reviewed       Procedure Component Value Units Date/Time    Urine Microscopic [[id number]]  (Abnormal) Collected: 02/18/24 0738    Lab Status: Final result Specimen: Urine, Clean Catch Updated: 02/18/24 0816     RBC, UA 2-4 /hpf      WBC, UA 2-4 /hpf      Epithelial Cells Moderate /hpf      Bacteria, UA Occasional /hpf      Hyaline Casts, UA 1-2 /lpf      Fine granular casts 3-4 /lpf     UA w Reflex to Microscopic w Reflex to Culture [[id number]]   (Abnormal) Collected: 02/18/24 0738    Lab Status: Final result Specimen: Urine, Clean Catch Updated: 02/18/24 0748     Color, UA Yellow     Clarity, UA Slightly Cloudy     Specific Gravity, UA >=1.030     pH, UA 5.5     Leukocytes, UA Negative     Nitrite, UA Negative     Protein, UA 30 (1+) mg/dl      Glucose, UA Negative mg/dl      Ketones, UA Negative mg/dl      Urobilinogen, UA 0.2 E.U./dl      Bilirubin, UA Negative     Occult Blood, UA Small    Lipase [[id number]]  (Abnormal) Collected: 02/18/24 0713    Lab Status: Final result Specimen: Blood from Line, Venous Updated: 02/18/24 0746     Lipase 86 u/L     Comprehensive metabolic panel [[id number]]  (Abnormal) Collected: 02/18/24 0713    Lab Status: Final result Specimen: Blood from Line, Venous Updated: 02/18/24 0746     Sodium 133 mmol/L      Potassium 3.6 mmol/L      Chloride 106 mmol/L      CO2 18 mmol/L      ANION GAP 9 mmol/L      BUN 15 mg/dL      Creatinine 1.14 mg/dL      Glucose 95 mg/dL      Calcium 8.6 mg/dL      AST 17 U/L      ALT 12 U/L      Alkaline Phosphatase 42 U/L      Total Protein 7.3 g/dL      Albumin 4.1 g/dL      Total Bilirubin 0.39 mg/dL      eGFR 60 ml/min/1.73sq m     Narrative:      National Kid[employer] Foundation guidelines for Chronic Kidney Disease (CKD):     Stage 1 with normal or high GFR (GFR > 90 mL/min/1.73 square meters)    Stage 2 Mild CKD (GFR = 60-89 mL/min/1.73 square meters)    Stage 3A Moderate CKD (GFR = 45-59 mL/min/1.73 square meters)    Stage 3B Moderate CKD (GFR = 30-44 mL/min/1.73 square meters)    Stage 4 Severe CKD (GFR = 15-29 mL/min/1.73 square meters)    Stage 5 End Stage CKD (GFR <15 mL/min/1.73 square meters)  Note: GFR calculation is accurate only with a steady state creatinine    Lactic acid, plasma (w/reflex if result > 2.0) [[id number]]  (Normal) Collected: 02/18/24 0713    Lab Status: Final result Specimen: Blood from Line, Venous Updated: 02/18/24 0743     LACTIC ACID 0.8 mmol/L     Narrative:       Result may be elevated if tourniquet was used during collection.    Protime-INR [[id number]]  (Normal) Collected: 02/18/24 0713    Lab Status: Final result Specimen: Blood from Line, Venous Updated: 02/18/24 0742     Protime 13.1 seconds      INR 0.96    APTT [[id number]]  (Normal) Collected: 02/18/24 0713    Lab Status: Final result Specimen: Blood from Line, Venous Updated: 02/18/24 0742     PTT 27 seconds     CBC and differential [[id number]]  (Abnormal) Collected: 02/18/24 0713    Lab Status: Final result Specimen: Blood from Line, Venous Updated: 02/18/24 0727     WBC 10.68 Thousand/uL      RBC 4.35 Million/uL      Hemoglobin 7.8 g/dL      Hematocrit 27.5 %      MCV 63 fL      MCH 17.9 pg      MCHC 28.4 g/dL      RDW 19.1 %      MPV 10.2 fL      Platelets 356 Thousands/uL      nRBC 0 /100 WBCs      Neutrophils Relative 77 %      Immat GRANS % 0 %      Lymphocytes Relative 14 %      Monocytes Relative 7 %      Eosinophils Relative 1 %      Basophils Relative 1 %      Neutrophils Absolute 8.24 Thousands/µL      Immature Grans Absolute 0.04 Thousand/uL      Lymphocytes Absolute 1.46 Thousands/µL      Monocytes Absolute 0.79 Thousand/µL      Eosinophils Absolute 0.08 Thousand/µL      Basophils Absolute 0.07 Thousands/µL                    CT abdomen pelvis wo contrast   Final Result by Robbie Shaw DO (02/18 0919)      Mild to moderate right hydroureteronephrosis secondary to a 3-4 mm proximal ureteric calculus. Additional small nonobstructing bilateral renal calculi.      Fluid seen throughout the colon, similar to previous study. Correlate for diarrheal illness.      Limited study without IV or oral contrast.      Workstation performed: [id number]                    Procedures  ECG 12 Lead Documentation Only    Date/Time: 2/18/2024 7:46 AM    Performed by: Raul Pickens MD  Authorized by: Raul Pickens MD    ECG reviewed by me, the ED Provider: yes    Patient location:  ED  Rate:     ECG rate:   80  Rhythm:     Rhythm: sinus rhythm    Ectopy:     Ectopy: none    QRS:     QRS axis:  Normal           ED Course  ED Course as of 02/18/24 0927   Sun Feb 18, 2024   0732 Hemoglobin(!): 7.8  Patient aware.  States this is near baseline.   0925 Patient seen.  Pain tolerable.  Agreeable to outpatient follow-up.                               SBIRT 20yo+      Flowsheet Row Most Recent Value   Initial Alcohol Screen: US AUDIT-C     1. How often do you have a drink containing alcohol? 0 Filed at: 02/18/2024 0706   2. How many drinks containing alcohol do you have on a typical day you are drinking?  0 Filed at: 02/18/2024 0706   3b. FEMALE Any Age, or MALE 65+: How often do you have 4 or more drinks on one occassion? 0 Filed at: 02/18/2024 0706   Audit-C Score 0 Filed at: 02/18/2024 0706   SLIME: How many times in the past year have you...    Used an illegal drug or used a prescription medication for non-medical reasons? Never Filed at: 02/18/2024 0706                      Medical Decision Making  Amount and/or Complexity of Data Reviewed  Labs: ordered. Decision-making details documented in ED Course.  Radiology: ordered. Decision-making details documented in ED Course.  ECG/medicine tests: ordered and independent interpretation performed. Decision-making details documented in ED Course.  Discussion of management or test interpretation with external provider(s): Differential diagnosis includes but not limited to cholelithiasis, cholecystitis, peptic ulcer disease, gastritis, pancreatitis, diverticulitis, colitis, bowel obstruction, appendicitis, UTI, ureteral stone, kidney stone, inflammatory bowel disease.    Risk  Prescription drug management.             Disposition  Final diagnoses:   Right ureteral stone     Time reflects when diagnosis was documented in both MDM as applicable and the Disposition within this note       Time User Action Codes Description Comment    2/18/2024  9:25 AM Raul Pickens Add [N20.1]  Right ureteral stone           ED Disposition       ED Disposition   Discharge    Condition   Stable    Date/Time   Sun Feb 18, 2024 0925    Comment   Brittani Patino discharge to home/self care.                   Follow-up Information       Follow up With Specialties Details Why Contact Info    Trenton Raymundo MD Urology Call in 1 day  [address] [place] [address] [phone]      Horace Vasquez IV, MD Family Medicine Call in 2 days  [address]  [address]  [facility] [address] [phone]              Patient's Medications   Discharge Prescriptions    OXYCODONE-ACETAMINOPHEN (PERCOCET) 5-325 MG PER TABLET    Take 1 tablet by mouth every 4 (four) hours as needed for moderate pain for up to 10 doses Max Daily Amount: 6 tablets       Start Date: 2/18/2024 End Date: --       Order Dose: 1 tablet       Quantity: 10 tablet    Refills: 0    TAMSULOSIN (FLOMAX) 0.4 MG    Take 1 capsule (0.4 mg total) by mouth daily with dinner for 7 days       Start Date: 2/18/2024 End Date: 2/25/2024       Order Dose: 0.4 mg       Quantity: 7 capsule    Refills: 0           PDMP Review         Value Time User    PDMP Reviewed  Yes 8/11/2023  2:08 PM Garth Roberts MD            ED Provider  Electronically Signed by             Ralu Pickens MD  02/18/24 0947

## 2024-02-19 LAB
ATRIAL RATE: 80 BPM
P AXIS: 57 DEGREES
PR INTERVAL: 108 MS
QRS AXIS: 52 DEGREES
QRSD INTERVAL: 92 MS
QT INTERVAL: 394 MS
QTC INTERVAL: 454 MS
T WAVE AXIS: 51 DEGREES
VENTRICULAR RATE: 80 BPM

## 2024-02-21 ENCOUNTER — OFFICE VISIT (OUTPATIENT)
Dept: NEPHROLOGY | Facility: CLINIC | Age: 39
End: 2024-02-21
Payer: COMMERCIAL

## 2024-02-21 VITALS
SYSTOLIC BLOOD PRESSURE: 108 MMHG | BODY MASS INDEX: 21.16 KG/M2 | OXYGEN SATURATION: 99 % | HEART RATE: 74 BPM | WEIGHT: 115 LBS | HEIGHT: 62 IN | DIASTOLIC BLOOD PRESSURE: 68 MMHG

## 2024-02-21 DIAGNOSIS — N20.0 NEPHROLITHIASIS: Primary | ICD-10-CM

## 2024-02-21 DIAGNOSIS — E55.9 VITAMIN D INSUFFICIENCY: ICD-10-CM

## 2024-02-21 DIAGNOSIS — Z98.84 HISTORY OF GASTRIC BYPASS: ICD-10-CM

## 2024-02-21 DIAGNOSIS — E61.1 IRON DEFICIENCY: ICD-10-CM

## 2024-02-21 DIAGNOSIS — E87.6 HYPOKALEMIA: ICD-10-CM

## 2024-02-21 PROCEDURE — 99214 OFFICE O/P EST MOD 30 MIN: CPT | Performed by: INTERNAL MEDICINE
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
## 2024-02-21 NOTE — ASSESSMENT & PLAN NOTE
Continue high-dose vitamin D supplementation least once weekly for now.  Will reassess vitamin D with next set of labs, more aggressive supplementation as indicated.

## 2024-02-21 NOTE — ASSESSMENT & PLAN NOTE
Continue current at least 2 L of fluid intake a day.  Although the patient had an order for 24-hour urine, she has not had a chance to do it since the spring 2023.  I reordered this in order to most effectively identify and treat potential etiology for propensity toward stone formation.  Given her history of gastric bypass, she certainly has a propensity toward calcium oxalate stones, however, most recent stone was not identified as predominantly calcium oxalate but rather a phosphorus based stone.

## 2024-02-21 NOTE — ASSESSMENT & PLAN NOTE
Potassium levels have been stable with daily infusions of potassium chloride 80 mEq as the patient sleeps.

## 2024-02-21 NOTE — PROGRESS NOTES
[facility]  Juan Carlos Ricketts DO    Name: Brittani Patino  YOB: 1985      Assessment/Plan:    Nephrolithiasis  Continue current at least 2 L of fluid intake a day.  Although the patient had an order for 24-hour urine, she has not had a chance to do it since the spring 2023.  I reordered this in order to most effectively identify and treat potential etiology for propensity toward stone formation.  Given her history of gastric bypass, she certainly has a propensity toward calcium oxalate stones, however, most recent stone was not identified as predominantly calcium oxalate but rather a phosphorus based stone.    Hypokalemia  Potassium levels have been stable with daily infusions of potassium chloride 80 mEq as the patient sleeps.    Vitamin D insufficiency  Continue high-dose vitamin D supplementation least once weekly for now.  Will reassess vitamin D with next set of labs, more aggressive supplementation as indicated.    Iron deficiency  Patient has been recommended for iron infusions recently, however, at the time of the scheduled infusions, there were winter storms and she was unable to make the infusion.  Reassess iron, additional supplementation or infusions as indicated.         Problem List Items Addressed This Visit          Genitourinary    Nephrolithiasis - Primary     Continue current at least 2 L of fluid intake a day.  Although the patient had an order for 24-hour urine, she has not had a chance to do it since the spring 2023.  I reordered this in order to most effectively identify and treat potential etiology for propensity toward stone formation.  Given her history of gastric bypass, she certainly has a propensity toward calcium oxalate stones, however, most recent stone was not identified as predominantly calcium oxalate but rather a phosphorus based stone.         Relevant Orders    Uric acid    Urinalysis with microscopic    Stone risk profile     PTH, intact    Vitamin D 25 hydroxy       Other    Hypokalemia     Potassium levels have been stable with daily infusions of potassium chloride 80 mEq as the patient sleeps.         History of gastric bypass    Vitamin D insufficiency     Continue high-dose vitamin D supplementation least once weekly for now.  Will reassess vitamin D with next set of labs, more aggressive supplementation as indicated.         Iron deficiency     Patient has been recommended for iron infusions recently, however, at the time of the scheduled infusions, there were winter storms and she was unable to make the infusion.  Reassess iron, additional supplementation or infusions as indicated.            Patient is stable from renal standpoint, potassium has been appropriate with home infusions.  We briefly reviewed the potential etiology of hypokalemia including going back to 2018 and discussing potential etiologies.  Patient had been considering on her own what happened differently to her at that time.  She claims that she accidentally slammed through baby jellyfish and required brief hospitalization to recover.  After that, she began to experience potassium issues which persist to this day.  Patient also experiencing kidney stones, possibly hypokalemia associated with kidney stones can be indicative of a renal tubular acidosis, and certainly the patient has had low bicarbonate levels however, this is also in the setting of chronic loose bowel movements post gastric bypass.    Will continue with current care at this time, patient may be experiencing a de cornell process versus physiology which may be present in the setting of compromised absorption given history of gastric bypass surgery, versus other underlying physiologic circumstances which are yet to be determined.    Subjective:      Patient ID: Brittani Patino is a 39 y.o. female.    Patient presents for follow up.    We reviewed labs in detail, most recent creatinine noted to be 1.14  mg/dL, estimate GFR 60 mL/min.  However, this was added, the patient was in some distress.  Previous blood work noted that her creatinine as well as potassium levels which were mmol/L and greater.    There were no significant electrolyte abnormalities noted.  Patient is taking all medications as prescribed with no specific side effects and denies use of nonsteroid anti-inflammatory medications.    Patient is refusing with potassium chloride 80 mEq IV via her new Port-A-Cath with no issues.            The following portions of the patient's history were reviewed and updated as appropriate: allergies, current medications, past family history, past medical history, past social history, past surgical history and problem list.    Review of Systems   All other systems reviewed and are negative.        Social History     Socioeconomic History    Marital status: /Civil Union     Spouse name: None    Number of children: None    Years of education: None    Highest education level: None   Occupational History    None   Tobacco Use    Smoking status: Never    Smokeless tobacco: Never   Vaping Use    Vaping status: Never Used   Substance and Sexual Activity    Alcohol use: Not Currently    Drug use: Not Currently     Types: Marijuana     Comment: Medical marijuana    Sexual activity: Yes     Partners: Male     Birth control/protection: Surgical   Other Topics Concern    None   Social History Narrative    Rarely consumes alcohol - As per [employer]      Social Determinants of Health     Financial Resource Strain: Not on file   Food Insecurity: Food Insecurity Present (9/2/2022)    Hunger Vital Sign     Worried About Running Out of Food in the Last Year: Sometimes true     Ran Out of Food in the Last Year: Sometimes true   Transportation Needs: Unknown (9/2/2022)    PRAPARE - Transportation     Lack of Transportation (Medical): Not on file     Lack of Transportation (Non-Medical): No   Physical Activity: Not on file   Stress:  "Not on file   Social Connections: Not on file   Intimate Partner Violence: Not on file   Housing Stability: Low Risk  (2022)    Housing Stability Vital Sign     Unable to Pay for Housing in the Last Year: No     Number of Places Lived in the Last Year: 1     Unstable Housing in the Last Year: No     Past Medical History:   Diagnosis Date    C. difficile colitis 2016    COVID-19     2022- pt denies long covid    DUB (dysfunctional uterine bleeding)     H. pylori infection     Hypertension     Hypokalemia     Kidney stone 2023    Left lower quadrant abdominal pain 2020    Migraine     Pancreatitis     Psychiatric disorder     Anxiety    Rectal bleeding     Renal disorder     Rhabdomyolysis     Sleep apnea     resolved with bariatric surgery    Thrombosed external hemorrhoid      Past Surgical History:   Procedure Laterality Date    ABDOMINAL SURGERY      APPENDECTOMY       SECTION      CHOLECYSTECTOMY      COSMETIC SURGERY      \"tummy tuck\"    FL GUIDED CENTRAL VENOUS ACCESS DEVICE INSERTION  2018    FL GUIDED CENTRAL VENOUS ACCESS DEVICE INSERTION  2024    FL RETROGRADE PYELOGRAM  1/3/2023    GASTRIC BYPASS      HYSTERECTOMY      LAPAROSCOPY      NJ ANRCT XM SURG REQ ANES GENERAL SPI/EDRL DX N/A 2021    Procedure: ANAL EXAM UNDER ANESTHESIA; HEMORRHOIDECTOMY;  Surgeon: Dona Jeffries DO;  Location: [facility];  Service: General    NJ CYSTO/URETERO W/LITHOTRIPSY &INDWELL STENT INSRT Left 1/3/2023    Procedure: CYSTOSCOPY URETEROSCOPY WITH RETROGRADE PYELOGRAM, BASKET STONE EXTRACTION AND INSERTION STENT URETERAL;  Surgeon: Geovanny Rosales MD;  Location: [facility];  Service: Urology    NJ EXC THROMBOSED HEMORRHOID XTRNL N/A 2022    Procedure: EXCISION OF THROMBOSED EXTERNAL HEMORRHOID, Excision of perianal skin tag, dranage of perianal abscess, anal fistulatomy;  Surgeon: Dona Jeffries DO;  Location: [facility];  Service: General    NJ INSJ " TUNNELED CTR VAD W/SUBQ PORT AGE 4 YR/> Left 2/9/2024    Procedure: INSERTION VENOUS PORT (PORT-A-CATH);  Surgeon: Ryan Singh DO;  Location: [facility];  Service: General    OH RMVL AMIRAH CTR VAD W/SUBQ PORT/ CTR/PRPH INSJ Right 2/9/2024    Procedure: REMOVAL VENOUS PORT (PORT-A-CATH);  Surgeon: Ryan Singh DO;  Location: [facility];  Service: General    TUNNELED VENOUS PORT PLACEMENT N/A 12/21/2018    Procedure: INSERTION VENOUS PORT (PORT-A-CATH);  Surgeon: Ryan Singh DO;  Location: [facility];  Service: General       Current Outpatient Medications:     acetaminophen (TYLENOL) 325 mg tablet, , Disp: , Rfl:     amitriptyline (ELAVIL) 10 mg tablet, Take 2 tablets (20 mg total) by mouth daily at bedtime, Disp: 60 tablet, Rfl: 0    cyanocobalamin 1,000 mcg/mL, Inject 1 mL (1,000 mcg total) into a muscle every 30 (thirty) days Next dose due 5/1/2023, Disp: , Rfl:     dicyclomine (BENTYL) 10 mg capsule, Take 10 mg by mouth if needed, Disp: , Rfl:     ergocalciferol (ERGOCALCIFEROL) 1.25 MG (85783 UT) capsule, Take 1 capsule (50,000 Units total) by mouth once a week Mondays and Thursdays, Disp: , Rfl:     escitalopram (LEXAPRO) 20 mg tablet, 20 mg daily at bedtime, Disp: , Rfl:     gabapentin (Neurontin) 300 mg capsule, Take 1 capsule (300 mg total) by mouth 3 (three) times a day, Disp: 30 capsule, Rfl: 2    methocarbamol (ROBAXIN) 500 mg tablet, Take 1 tablet (500 mg total) by mouth 4 (four) times a day, Disp: 30 tablet, Rfl: 2    ondansetron (ZOFRAN-ODT) 4 mg disintegrating tablet, , Disp: , Rfl:     oxyCODONE-acetaminophen (PERCOCET) 5-325 mg per tablet, Take 1 tablet by mouth every 4 (four) hours as needed for moderate pain for up to 10 doses Max Daily Amount: 6 tablets, Disp: 10 tablet, Rfl: 0    potassium chloride 0.4 mEq/mL, Inject 200 mL (80 mEq total) into a catheter in a vein over 8 hours at 25 mL/hr daily 80 meq in 1 litre bag to be infused daily over 8 hours  Continue as ordered prior to  "admission, Disp: 2800 mL, Rfl: 0    Syringe/Needle, Disp, (SYRINGE 3CC/25GX5/8\") 25G X 5/8\" 3 ML MISC, Use once monthly for B12 injection., Disp: 1 each, Rfl: 11    tamsulosin (FLOMAX) 0.4 mg, Take 1 capsule (0.4 mg total) by mouth daily with dinner for 7 days (Patient taking differently: Take 0.4 mg by mouth daily with dinner), Disp: 7 capsule, Rfl: 0    vitamin A 3 MG (32806 UT) capsule, Take 1 capsule by mouth 2 (two) times a day, Disp: , Rfl:     vitamin k 100 MCG tablet, Take 1 tablet by mouth daily, Disp: , Rfl:     Lab Results   Component Value Date    SODIUM 133 (L) 02/18/2024    K 3.6 02/18/2024     02/18/2024    CO2 18 (L) 02/18/2024    AGAP 9 02/18/2024    BUN 15 02/18/2024    CREATININE 1.14 02/18/2024    GLUC 95 02/18/2024    GLUF 85 02/05/2024    CALCIUM 8.6 02/18/2024    AST 17 02/18/2024    ALT 12 02/18/2024    ALKPHOS 42 02/18/2024    TP 7.3 02/18/2024    TBILI 0.39 02/18/2024    EGFR 60 02/18/2024     Lab Results   Component Value Date    WBC 10.68 (H) 02/18/2024    HGB 7.8 (L) 02/18/2024    HCT 27.5 (L) 02/18/2024    MCV 63 (L) 02/18/2024     02/18/2024     Lab Results   Component Value Date    CHOLESTEROL 161 02/19/2021     Lab Results   Component Value Date    HDL 70 02/19/2021     Lab Results   Component Value Date    LDLCALC 70 02/19/2021     Lab Results   Component Value Date    TRIG 72 09/01/2022    TRIG 85 06/18/2021    TRIG 107 02/19/2021     No results found for: \"CHOLHDL\"  Lab Results   Component Value Date    XFC3HUYBDJCP 1.450 05/01/2022    TSH 2.60 04/08/2019     Lab Results   Component Value Date    CALCIUM 8.6 02/18/2024    PHOS 3.8 11/10/2023     Lab Results   Component Value Date    SPEP  07/11/2018     The serum total protein and albumin are decreased. The serum protein electrophoresis shows a decreased beta-1 region. No monoclonal bands noted. Reviewed by: Adalid Trinh MD  ([id number])  **Electronic Signature**    UPEP  07/12/2018     The urine total protein and " "electrophoresis are within normal limits.  No monoclonal bands noted. Reviewed by: Adalid Trinh MD ([id number])  **Electronic Signature**     No results found for: \"MICROALBUR\", \"[id number]\"        Objective:      /68 (BP Location: Left arm, Patient Position: Sitting)   Pulse 74   Ht 5' 2\" (1.575 m)   Wt 52.2 kg (115 lb)   LMP  (LMP Unknown)   SpO2 99%   BMI 21.03 kg/m²          Physical Exam  Vitals reviewed.   Constitutional:       General: She is not in acute distress.     Appearance: Normal appearance.   HENT:      Head: Normocephalic and atraumatic.      Right Ear: External ear normal.      Left Ear: External ear normal.   Eyes:      Conjunctiva/sclera: Conjunctivae normal.   Cardiovascular:      Rate and Rhythm: Normal rate and regular rhythm.      Heart sounds: Normal heart sounds.   Pulmonary:      Effort: No respiratory distress.      Breath sounds: No wheezing.   Abdominal:      Palpations: Abdomen is soft.   Skin:     General: Skin is warm and dry.      Comments: Previous site of Port-A-Cath incision appears intact and well-approximated, no discharge noted   Neurological:      General: No focal deficit present.      Mental Status: She is alert and oriented to person, place, and time.   Psychiatric:         Mood and Affect: Mood normal.         Behavior: Behavior normal.         "

## 2024-02-22 ENCOUNTER — APPOINTMENT (OUTPATIENT)
Dept: LAB | Facility: CLINIC | Age: 39
End: 2024-02-22
Payer: COMMERCIAL

## 2024-02-22 DIAGNOSIS — N20.0 NEPHROLITHIASIS: ICD-10-CM

## 2024-02-22 LAB
25(OH)D3 SERPL-MCNC: <7 NG/ML (ref 30–100)
ALBUMIN SERPL BCP-MCNC: 3.9 G/DL (ref 3.5–5)
AMORPH URATE CRY URNS QL MICRO: ABNORMAL
BACTERIA UR QL AUTO: ABNORMAL /HPF
BILIRUB UR QL STRIP: NEGATIVE
CA-I BLD-SCNC: 1.18 MMOL/L (ref 1.12–1.32)
CLARITY UR: ABNORMAL
COLOR UR: ABNORMAL
GLUCOSE UR STRIP-MCNC: NEGATIVE MG/DL
GRAN CASTS #/AREA URNS LPF: ABNORMAL /[LPF]
HGB UR QL STRIP.AUTO: NEGATIVE
HYALINE CASTS #/AREA URNS LPF: ABNORMAL /LPF
KETONES UR STRIP-MCNC: NEGATIVE MG/DL
LEUKOCYTE ESTERASE UR QL STRIP: ABNORMAL
MAGNESIUM SERPL-MCNC: 1.9 MG/DL (ref 1.9–2.7)
MUCOUS THREADS UR QL AUTO: ABNORMAL
NITRITE UR QL STRIP: NEGATIVE
NON-SQ EPI CELLS URNS QL MICRO: ABNORMAL /HPF
PH UR STRIP.AUTO: 6 [PH]
PHOSPHATE SERPL-MCNC: 3.2 MG/DL (ref 2.7–4.5)
PROT UR STRIP-MCNC: ABNORMAL MG/DL
PTH-INTACT SERPL-MCNC: 87.1 PG/ML (ref 12–88)
RBC #/AREA URNS AUTO: ABNORMAL /HPF
SP GR UR STRIP.AUTO: 1.02 (ref 1–1.03)
URATE SERPL-MCNC: 3.5 MG/DL (ref 2–7.5)
UROBILINOGEN UR STRIP-ACNC: <2 MG/DL
WBC #/AREA URNS AUTO: ABNORMAL /HPF

## 2024-02-22 PROCEDURE — 36415 COLL VENOUS BLD VENIPUNCTURE: CPT

## 2024-02-22 PROCEDURE — 83735 ASSAY OF MAGNESIUM: CPT

## 2024-02-22 PROCEDURE — 82040 ASSAY OF SERUM ALBUMIN: CPT

## 2024-02-22 PROCEDURE — 82330 ASSAY OF CALCIUM: CPT

## 2024-02-22 PROCEDURE — 84550 ASSAY OF BLOOD/URIC ACID: CPT

## 2024-02-22 PROCEDURE — 81001 URINALYSIS AUTO W/SCOPE: CPT

## 2024-02-22 PROCEDURE — 84100 ASSAY OF PHOSPHORUS: CPT

## 2024-02-22 PROCEDURE — 83970 ASSAY OF PARATHORMONE: CPT

## 2024-02-22 PROCEDURE — 82306 VITAMIN D 25 HYDROXY: CPT

## 2024-02-23 DIAGNOSIS — M54.2 NECK PAIN: Primary | ICD-10-CM

## 2024-02-23 DIAGNOSIS — E55.9 VITAMIN D INSUFFICIENCY: ICD-10-CM

## 2024-02-23 RX ORDER — ERGOCALCIFEROL 1.25 MG/1
50000 CAPSULE ORAL 2 TIMES WEEKLY
Qty: 24 CAPSULE | Refills: 1 | Status: SHIPPED | OUTPATIENT
Start: 2024-02-26

## 2024-02-27 ENCOUNTER — TELEPHONE (OUTPATIENT)
Age: 39
End: 2024-02-27

## 2024-02-27 NOTE — TELEPHONE ENCOUNTER
Pt calls states she is supposed to have CT scan but office told her they dont have an authorization or order for it from Dr. Singh's office. Pls advise.

## 2024-02-28 ENCOUNTER — HOSPITAL ENCOUNTER (OUTPATIENT)
Dept: CT IMAGING | Facility: HOSPITAL | Age: 39
Discharge: HOME | End: 2024-02-28
Attending: SURGERY
Payer: COMMERCIAL

## 2024-02-28 DIAGNOSIS — M54.2 NECK PAIN: ICD-10-CM

## 2024-02-28 PROCEDURE — 70491 CT SOFT TISSUE NECK W/DYE: CPT

## 2024-02-28 RX ADMIN — IOHEXOL 85 ML: 350 INJECTION, SOLUTION INTRAVENOUS at 13:34

## 2024-03-04 ENCOUNTER — OFFICE VISIT (OUTPATIENT)
Dept: SURGERY | Facility: CLINIC | Age: 39
End: 2024-03-04

## 2024-03-04 VITALS
HEIGHT: 62 IN | TEMPERATURE: 98 F | HEART RATE: 75 BPM | BODY MASS INDEX: 20.61 KG/M2 | WEIGHT: 112 LBS | SYSTOLIC BLOOD PRESSURE: 105 MMHG | DIASTOLIC BLOOD PRESSURE: 67 MMHG

## 2024-03-04 DIAGNOSIS — T14.8XXA OPEN WOUND: Primary | ICD-10-CM

## 2024-03-04 PROCEDURE — 99024 POSTOP FOLLOW-UP VISIT: CPT | Performed by: SURGERY

## 2024-03-04 NOTE — PROGRESS NOTES
Assessment/Plan:    Open wound / skin tear of right chest wall  Status post excision and removal of right-sided port.  Placement of left-sided port.  Right chest incision clean dry.  Noted small dehiscence but no evidence of active drainage.  No evidence of infection.  Incisions to the left chest and left neck clean dry intact.  Port working appropriately.  Patient's neck throbbing/spasming improved.  CT scan of the neck personally reviewed by me.  No evidence of any hematoma.  Port appears to be in the correct position.  Will await final read.  Follow-up as needed     Diagnoses and all orders for this visit:    Open wound / skin tear of right chest wall          Subjective:      Patient ID: Brittani Patino is a 39 y.o. female.    39-year-old female with longstanding port use due to electrolyte abnormalities with subsequent wound of right-sided chest wall port status post removal of right-sided chest port and placement of left-sided chest port.  Patient with complaints of neck throbbing on the left side.  CT scan of the neck ordered as been completed but not officially read.  Patient states overall her neck discomfort is improved.  She does have some mild discomfort at the stitch of the left neck.  She does note that the wound on the right-sided chest is slightly open with no drainage and no significant discomfort.        The following portions of the patient's history were reviewed and updated as appropriate: She  has a past medical history of C. difficile colitis (2016), COVID-19, DUB (dysfunctional uterine bleeding), H. pylori infection, Hypertension, Hypokalemia, Kidney stone (November 2023), Left lower quadrant abdominal pain (08/17/2020), Migraine, Pancreatitis, Psychiatric disorder, Rectal bleeding, Renal disorder, Rhabdomyolysis, Sleep apnea, and Thrombosed external hemorrhoid.  She   Patient Active Problem List    Diagnosis Date Noted    Iron deficiency anemia due to dietary causes 11/16/2023     Nephrolithiasis 06/16/2023    Enterocolitis 05/23/2023    Intussusception (HCC) 05/23/2023    Leukocytosis 01/03/2023    Left ureteral calculus 01/02/2023    History of intussusception 10/20/2022    Idiopathic acute pancreatitis without infection or necrosis 09/01/2022    Perianal abscess 07/08/2022    Episode of shaking     Psychogenic nonepileptic spells 04/30/2022    History of anxiety 04/24/2022    Intractable vomiting 03/04/2022    Hyponatremia 03/04/2022    Iron deficiency 09/30/2021    Other hemorrhoids     Open wound / skin tear of right chest wall 09/20/2021    Cellulitis of chest wall 09/17/2021    Acute pancreatitis 09/17/2021    Acute on chronic abdominal pain 06/17/2021    Abnormal LFTs 03/26/2021    Gas pain 03/26/2021    Middle ear effusion 03/03/2020    Normal anion gap metabolic acidosis 02/25/2020    Left upper quadrant abdominal pain 07/14/2019    Vertigo 07/14/2019    Stress fracture of right foot with routine healing 07/14/2019    Right ovarian cyst 01/02/2019    Chest pain 01/02/2019    Other intestinal malabsorption 10/05/2018    Gastric bypass status for obesity 10/02/2018    GERD (gastroesophageal reflux disease) 09/03/2018    Paresthesia of both lower extremities 08/15/2018    Paresthesias 08/15/2018    Fatigue 07/12/2018    Elevated CPK 07/11/2018    Vitamin B12 deficiency 07/11/2018    Cervical lymphadenopathy 07/11/2018    Chronic anemia 07/10/2018    Vitamin D insufficiency 07/10/2018    Hypocalcemia 07/09/2018    Hypophosphatemia 07/09/2018    Hypokalemia 07/07/2018    History of gastric bypass 07/07/2018    Migraine without aura and without status migrainosus, not intractable 07/07/2018    Left-sided weakness 07/07/2018    Transaminitis 07/07/2018    B12 deficiency 01/17/2016    Essential hypertension 10/14/2014    WAGNER (obstructive sleep apnea) 06/11/2014    Migraine 05/29/2014     She  has a past surgical history that includes Hysterectomy; Appendectomy; Cholecystectomy; Gastric  bypass; Abdominal surgery; FL guided central venous access device insertion (2018); Tunneled venous port placement (N/A, 2018);  section; Cosmetic surgery; LAPAROSCOPY; pr anrct xm surg req anes general spi/edrl dx (N/A, 2021); pr exc thrombosed hemorrhoid xtrnl (N/A, 2022); pr cysto/uretero w/lithotripsy &indwell stent insrt (Left, 1/3/2023); FL retrograde pyelogram (1/3/2023); pr rmvl catrina ctr vad w/subq port/ ctr/prph insj (Right, 2024); pr insj tunneled ctr vad w/subq port age 4 yr/> (Left, 2024); and FL guided central venous access device insertion (2024).  Her family history includes Diabetes in her father and maternal grandfather; Hypertension in her father; No Known Problems in her mother; Prostate cancer in her father; Urolithiasis in her father.  She  reports that she has never smoked. She has never used smokeless tobacco. She reports that she does not currently use alcohol. She reports that she does not currently use drugs after having used the following drugs: Marijuana.  Current Outpatient Medications   Medication Sig Dispense Refill    acetaminophen (TYLENOL) 325 mg tablet       amitriptyline (ELAVIL) 10 mg tablet Take 2 tablets (20 mg total) by mouth daily at bedtime 60 tablet 0    cyanocobalamin 1,000 mcg/mL Inject 1 mL (1,000 mcg total) into a muscle every 30 (thirty) days Next dose due 2023      dicyclomine (BENTYL) 10 mg capsule Take 10 mg by mouth if needed      ergocalciferol (ERGOCALCIFEROL) 1.25 MG (25851 UT) capsule Take 1 capsule (50,000 Units total) by mouth 2 (two) times a week  and  24 capsule 1    escitalopram (LEXAPRO) 20 mg tablet 20 mg daily at bedtime      gabapentin (Neurontin) 300 mg capsule Take 1 capsule (300 mg total) by mouth 3 (three) times a day 30 capsule 2    methocarbamol (ROBAXIN) 500 mg tablet Take 1 tablet (500 mg total) by mouth 4 (four) times a day 30 tablet 2    ondansetron (ZOFRAN-ODT) 4 mg  "disintegrating tablet       oxyCODONE-acetaminophen (PERCOCET) 5-325 mg per tablet Take 1 tablet by mouth every 4 (four) hours as needed for moderate pain for up to 10 doses Max Daily Amount: 6 tablets 10 tablet 0    potassium chloride 0.4 mEq/mL Inject 200 mL (80 mEq total) into a catheter in a vein over 8 hours at 25 mL/hr daily 80 meq in 1 litre bag to be infused daily over 8 hours   Continue as ordered prior to admission 2800 mL 0    Syringe/Needle, Disp, (SYRINGE 3CC/25GX5/8\") 25G X 5/8\" 3 ML MISC Use once monthly for B12 injection. 1 each 11    tamsulosin (FLOMAX) 0.4 mg Take 1 capsule (0.4 mg total) by mouth daily with dinner for 7 days (Patient taking differently: Take 0.4 mg by mouth daily with dinner) 7 capsule 0    vitamin A 3 MG (16711 UT) capsule Take 1 capsule by mouth 2 (two) times a day      vitamin k 100 MCG tablet Take 1 tablet by mouth daily       No current facility-administered medications for this visit.     She is allergic to nsaids and prednisone..    Review of Systems   Constitutional:  Negative for activity change, appetite change, chills, diaphoresis, fatigue, fever and unexpected weight change.   Skin:  Positive for wound (right and left chest.  Left neck.). Negative for color change, pallor and rash.         Objective:      /67 (BP Location: Right arm, Patient Position: Sitting, Cuff Size: Standard)   Pulse 75   Temp 98 °F (36.7 °C)   Ht 5' 2\" (1.575 m)   Wt 50.8 kg (112 lb)   LMP  (LMP Unknown)   BMI 20.49 kg/m²          Physical Exam  Vitals reviewed.   Constitutional:       General: She is not in acute distress.     Appearance: Normal appearance. She is not ill-appearing, toxic-appearing or diaphoretic.   Skin:     General: Skin is warm.      Coloration: Skin is not jaundiced or pale.      Findings: No bruising or erythema.      Comments: Right chest clean and dry but noted some dehiscence along the lateral aspect.  No active drainage.  The left chest incision is noted to " be clean dry intact and currently accessed.  The left neck incision clean dry and intact and healed well.  Noted suture which was removed   Neurological:      Mental Status: She is alert.

## 2024-03-04 NOTE — ASSESSMENT & PLAN NOTE
Status post excision and removal of right-sided port.  Placement of left-sided port.  Right chest incision clean dry.  Noted small dehiscence but no evidence of active drainage.  No evidence of infection.  Incisions to the left chest and left neck clean dry intact.  Port working appropriately.  Patient's neck throbbing/spasming improved.  CT scan of the neck personally reviewed by me.  No evidence of any hematoma.  Port appears to be in the correct position.  Will await final read.  Follow-up as needed

## 2024-03-06 ENCOUNTER — OFFICE VISIT (OUTPATIENT)
Dept: URGENT CARE | Facility: CLINIC | Age: 39
End: 2024-03-06
Payer: COMMERCIAL

## 2024-03-06 VITALS
SYSTOLIC BLOOD PRESSURE: 104 MMHG | HEART RATE: 79 BPM | RESPIRATION RATE: 12 BRPM | OXYGEN SATURATION: 100 % | DIASTOLIC BLOOD PRESSURE: 51 MMHG | TEMPERATURE: 97.5 F | WEIGHT: 110.8 LBS | HEIGHT: 63 IN | BODY MASS INDEX: 19.63 KG/M2

## 2024-03-06 DIAGNOSIS — J01.10 ACUTE NON-RECURRENT FRONTAL SINUSITIS: Primary | ICD-10-CM

## 2024-03-06 LAB — S PYO AG THROAT QL: NEGATIVE

## 2024-03-06 PROCEDURE — 87880 STREP A ASSAY W/OPTIC: CPT

## 2024-03-06 PROCEDURE — 99213 OFFICE O/P EST LOW 20 MIN: CPT

## 2024-03-06 RX ORDER — AMOXICILLIN AND CLAVULANATE POTASSIUM 875; 125 MG/1; MG/1
1 TABLET, FILM COATED ORAL EVERY 12 HOURS SCHEDULED
Qty: 14 TABLET | Refills: 0 | Status: SHIPPED | OUTPATIENT
Start: 2024-03-06 | End: 2024-03-13

## 2024-03-06 NOTE — PATIENT INSTRUCTIONS
Rapid POC strep testing negative  Take antibiotic as prescribed  Continue with supportive measures, OTC Tylenol, nasal decongestants, and cough suppressants   Cool mist humidifiers, throat lozenges, salt gargles, honey, increased fluid intake and rest   Follow up with PCP in 3-5 days  Present to ER if symptoms worsen     If tests have been performed at [facility], our office will contact you with results if changes need to be made to the care plan discussed with you at the visit.  You can review your full results on [facility] MyChart.    Sinusitis   AMBULATORY CARE:   Sinusitis  is inflammation or infection of your sinuses. Sinusitis is most often caused by a virus. Acute sinusitis may last up to 12 weeks. Chronic sinusitis lasts longer than 12 weeks. Recurrent sinusitis means you have 4 or more infections in 1 year.        Common signs and symptoms:   Fever    Pain, pressure, redness, or swelling around the forehead, cheeks, or eyes    Thick yellow or green discharge from your nose    Tenderness when you touch your face over your sinuses    Dry cough that happens mostly at night or when you lie down    Headache and face pain that is worse when you lean forward    Tooth pain, or pain when you chew    Seek care immediately if:   You have trouble breathing or wheezing that is getting worse.    You have a stiff neck, a fever, or a bad headache.     You cannot open your eye.     Your eyeball bulges out or you cannot move your eye.     You are more sleepy than normal, or you notice changes in your ability to think, move, or talk.    You have swelling of your forehead or scalp.    Call your doctor if:   You have vision changes, such as double vision.    Your eye and eyelid are red, swollen, and painful.     Your symptoms do not improve or go away after 10 days.    You have nausea and are vomiting.    Your nose is bleeding.    You have questions or concerns about your condition or care.    Medicines:  Your symptoms may go  away on their own. Your healthcare provider may recommend watchful waiting for up to 10 days before starting antibiotics. You may need any of the following:  Acetaminophen  decreases pain and fever. It is available without a doctor's order. Ask how much to take and how often to take it. Follow directions. Read the labels of all other medicines you are using to see if they also contain acetaminophen, or ask your doctor or pharmacist. Acetaminophen can cause liver damage if not taken correctly.    NSAIDs , such as ibuprofen, help decrease swelling, pain, and fever. This medicine is available with or without a doctor's order. NSAIDs can cause stomach bleeding or kidney problems in certain people. If you take blood thinner medicine, always ask your healthcare provider if NSAIDs are safe for you. Always read the medicine label and follow directions.    Nasal steroid sprays  may help decrease inflammation in your nose and sinuses.    Decongestants  help reduce swelling and drain mucus in the nose and sinuses. They may help you breathe easier.     Antihistamines  help dry mucus in the nose and relieve sneezing.     Antibiotics  help treat or prevent a bacterial infection.    Self-care:   Rinse your sinuses as directed.  Use a sinus rinse device to rinse your nasal passages with a saline (salt water) solution or distilled water. Do not use tap water. This will help thin the mucus in your nose and rinse away pollen and dirt. It will also help reduce swelling so you can breathe normally.    Use a humidifier  to increase air moisture in your home. This may make it easier for you to breathe and help decrease your cough.     Sleep with your head elevated.  Place an extra pillow under your head before you go to sleep to help your sinuses drain.     Drink liquids as directed.  Ask your healthcare provider how much liquid to drink each day and which liquids are best for you. Liquids will thin the mucus in your nose and help it  drain. Avoid drinks that contain alcohol or caffeine.     Do not smoke, and avoid secondhand smoke.  Nicotine and other chemicals in cigarettes and cigars can make your symptoms worse. Ask your healthcare provider for information if you currently smoke and need help to quit. E-cigarettes or smokeless tobacco still contain nicotine. Talk to your healthcare provider before you use these products.    Prevent the spread of germs:   Wash your hands often with soap and water.  Wash your hands after you use the bathroom, change a child's diaper, or sneeze. Wash your hands before you prepare or eat food.         Stay away from people who are sick.  Some germs spread easily and quickly through contact.    Follow up with your doctor as directed:  You may be referred to an ear, nose, and throat specialist. Write down your questions so you remember to ask them during your visits.   © Copyright [employer] 2023 Information is for End User's use only and may not be sold, redistributed or otherwise used for commercial purposes.  The above information is an  only. It is not intended as medical advice for individual conditions or treatments. Talk to your doctor, nurse or pharmacist before following any medical regimen to see if it is safe and effective for you.

## 2024-03-06 NOTE — PROGRESS NOTES
[facility]        NAME: Brittani Patino is a 39 y.o. female  : 1985    MRN: [id number]  DATE: 2024  TIME: 8:21 AM    Assessment and Plan   Acute non-recurrent frontal sinusitis [J01.10]  1. Acute non-recurrent frontal sinusitis  POCT rapid strepA    amoxicillin-clavulanate (AUGMENTIN) 875-125 mg per tablet        Rapid POC strep testing negative.  Clinical findings correlate with sinusitis and given symptom duration greater than 1 week, will treat with Augmentin. Encouraged continued supportive measures.  Follow up with PCP in 3-5 days or proceed to emergency department for worsening symptoms.  Patient verbalized understanding of instructions given.       Patient Instructions     Patient Instructions     Rapid POC strep testing negative  Take antibiotic as prescribed  Continue with supportive measures, OTC Tylenol, nasal decongestants, and cough suppressants   Cool mist humidifiers, throat lozenges, salt gargles, honey, increased fluid intake and rest   Follow up with PCP in 3-5 days  Present to ER if symptoms worsen     If tests have been performed at [facility], our office will contact you with results if changes need to be made to the care plan discussed with you at the visit.  You can review your full results on [facility] MyChart.    Sinusitis   AMBULATORY CARE:   Sinusitis  is inflammation or infection of your sinuses. Sinusitis is most often caused by a virus. Acute sinusitis may last up to 12 weeks. Chronic sinusitis lasts longer than 12 weeks. Recurrent sinusitis means you have 4 or more infections in 1 year.        Common signs and symptoms:   Fever    Pain, pressure, redness, or swelling around the forehead, cheeks, or eyes    Thick yellow or green discharge from your nose    Tenderness when you touch your face over your sinuses    Dry cough that happens mostly at night or when you lie down    Headache and face pain that is worse when you lean forward    Tooth pain, or pain when you  chew    Seek care immediately if:   You have trouble breathing or wheezing that is getting worse.    You have a stiff neck, a fever, or a bad headache.     You cannot open your eye.     Your eyeball bulges out or you cannot move your eye.     You are more sleepy than normal, or you notice changes in your ability to think, move, or talk.    You have swelling of your forehead or scalp.    Call your doctor if:   You have vision changes, such as double vision.    Your eye and eyelid are red, swollen, and painful.     Your symptoms do not improve or go away after 10 days.    You have nausea and are vomiting.    Your nose is bleeding.    You have questions or concerns about your condition or care.    Medicines:  Your symptoms may go away on their own. Your healthcare provider may recommend watchful waiting for up to 10 days before starting antibiotics. You may need any of the following:  Acetaminophen  decreases pain and fever. It is available without a doctor's order. Ask how much to take and how often to take it. Follow directions. Read the labels of all other medicines you are using to see if they also contain acetaminophen, or ask your doctor or pharmacist. Acetaminophen can cause liver damage if not taken correctly.    NSAIDs , such as ibuprofen, help decrease swelling, pain, and fever. This medicine is available with or without a doctor's order. NSAIDs can cause stomach bleeding or kidney problems in certain people. If you take blood thinner medicine, always ask your healthcare provider if NSAIDs are safe for you. Always read the medicine label and follow directions.    Nasal steroid sprays  may help decrease inflammation in your nose and sinuses.    Decongestants  help reduce swelling and drain mucus in the nose and sinuses. They may help you breathe easier.     Antihistamines  help dry mucus in the nose and relieve sneezing.     Antibiotics  help treat or prevent a bacterial infection.    Self-care:   Rinse your  sinuses as directed.  Use a sinus rinse device to rinse your nasal passages with a saline (salt water) solution or distilled water. Do not use tap water. This will help thin the mucus in your nose and rinse away pollen and dirt. It will also help reduce swelling so you can breathe normally.    Use a humidifier  to increase air moisture in your home. This may make it easier for you to breathe and help decrease your cough.     Sleep with your head elevated.  Place an extra pillow under your head before you go to sleep to help your sinuses drain.     Drink liquids as directed.  Ask your healthcare provider how much liquid to drink each day and which liquids are best for you. Liquids will thin the mucus in your nose and help it drain. Avoid drinks that contain alcohol or caffeine.     Do not smoke, and avoid secondhand smoke.  Nicotine and other chemicals in cigarettes and cigars can make your symptoms worse. Ask your healthcare provider for information if you currently smoke and need help to quit. E-cigarettes or smokeless tobacco still contain nicotine. Talk to your healthcare provider before you use these products.    Prevent the spread of germs:   Wash your hands often with soap and water.  Wash your hands after you use the bathroom, change a child's diaper, or sneeze. Wash your hands before you prepare or eat food.         Stay away from people who are sick.  Some germs spread easily and quickly through contact.    Follow up with your doctor as directed:  You may be referred to an ear, nose, and throat specialist. Write down your questions so you remember to ask them during your visits.   © Copyright [employer] 2023 Information is for End User's use only and may not be sold, redistributed or otherwise used for commercial purposes.  The above information is an  only. It is not intended as medical advice for individual conditions or treatments. Talk to your doctor, nurse or pharmacist before following  any medical regimen to see if it is safe and effective for you.        Chief Complaint     Chief Complaint   Patient presents with    Cold Like Symptoms     Headache, bilateral ear pain and sore throat x8 days. OTC meds sudafed, tylenol, and cold/sinus.         History of Present Illness       39-year-old female with a past medical history significant for hypertension presents with complaints of ongoing headache, nasal congestion, sinus pressure, bilateral earache, and sore throat x 8 days.  Reports chills but denies fevers.  No nausea, vomiting, or diarrhea.  States positive sick contact/exposure as has been previously ill with similar symptoms.  She has been taking multiple OTC medications without relief.        Review of Systems   Review of Systems   Constitutional:  Positive for chills. Negative for fever.   HENT:  Positive for congestion, ear pain, postnasal drip, rhinorrhea, sinus pressure, sinus pain, sore throat and voice change. Negative for ear discharge and trouble swallowing.    Eyes:  Negative for discharge.   Respiratory:  Negative for cough, shortness of breath and wheezing.    Cardiovascular:  Negative for chest pain.   Gastrointestinal:  Negative for abdominal pain, diarrhea, nausea and vomiting.   Skin:  Negative for rash.   Neurological:  Positive for headaches.         Current Medications       Current Outpatient Medications:     acetaminophen (TYLENOL) 325 mg tablet, , Disp: , Rfl:     amitriptyline (ELAVIL) 10 mg tablet, Take 2 tablets (20 mg total) by mouth daily at bedtime, Disp: 60 tablet, Rfl: 0    amoxicillin-clavulanate (AUGMENTIN) 875-125 mg per tablet, Take 1 tablet by mouth every 12 (twelve) hours for 7 days, Disp: 14 tablet, Rfl: 0    cyanocobalamin 1,000 mcg/mL, Inject 1 mL (1,000 mcg total) into a muscle every 30 (thirty) days Next dose due 5/1/2023, Disp: , Rfl:     dicyclomine (BENTYL) 10 mg capsule, Take 10 mg by mouth if needed, Disp: , Rfl:     ergocalciferol (ERGOCALCIFEROL)  "1.25 MG (36595 UT) capsule, Take 1 capsule (50,000 Units total) by mouth 2 (two) times a week Mondays and Thursdays, Disp: 24 capsule, Rfl: 1    escitalopram (LEXAPRO) 20 mg tablet, 20 mg daily at bedtime, Disp: , Rfl:     gabapentin (Neurontin) 300 mg capsule, Take 1 capsule (300 mg total) by mouth 3 (three) times a day, Disp: 30 capsule, Rfl: 2    methocarbamol (ROBAXIN) 500 mg tablet, Take 1 tablet (500 mg total) by mouth 4 (four) times a day, Disp: 30 tablet, Rfl: 2    ondansetron (ZOFRAN-ODT) 4 mg disintegrating tablet, , Disp: , Rfl:     potassium chloride 0.4 mEq/mL, Inject 200 mL (80 mEq total) into a catheter in a vein over 8 hours at 25 mL/hr daily 80 meq in 1 litre bag to be infused daily over 8 hours  Continue as ordered prior to admission, Disp: 2800 mL, Rfl: 0    Syringe/Needle, Disp, (SYRINGE 3CC/25GX5/8\") 25G X 5/8\" 3 ML MISC, Use once monthly for B12 injection., Disp: 1 each, Rfl: 11    vitamin A 3 MG (13489 UT) capsule, Take 1 capsule by mouth 2 (two) times a day, Disp: , Rfl:     vitamin k 100 MCG tablet, Take 1 tablet by mouth daily, Disp: , Rfl:     oxyCODONE-acetaminophen (PERCOCET) 5-325 mg per tablet, Take 1 tablet by mouth every 4 (four) hours as needed for moderate pain for up to 10 doses Max Daily Amount: 6 tablets (Patient not taking: Reported on 3/6/2024), Disp: 10 tablet, Rfl: 0    tamsulosin (FLOMAX) 0.4 mg, Take 1 capsule (0.4 mg total) by mouth daily with dinner for 7 days (Patient taking differently: Take 0.4 mg by mouth daily with dinner), Disp: 7 capsule, Rfl: 0    Current Allergies     Allergies as of 03/06/2024 - Reviewed 03/06/2024   Allergen Reaction Noted    Nsaids Other (See Comments) 12/22/2017    Prednisone  07/16/2019            The following portions of the patient's history were reviewed and updated as appropriate: allergies, current medications, past family history, past medical history, past social history, past surgical history and problem list.     Past Medical " "History:   Diagnosis Date    C. difficile colitis 2016    COVID-19     2022- pt denies long covid    DUB (dysfunctional uterine bleeding)     H. pylori infection     Hypertension     Hypokalemia     Kidney stone 2023    Left lower quadrant abdominal pain 2020    Migraine     Pancreatitis     Psychiatric disorder     Anxiety    Rectal bleeding     Renal disorder     Rhabdomyolysis     Sleep apnea     resolved with bariatric surgery    Thrombosed external hemorrhoid        Past Surgical History:   Procedure Laterality Date    ABDOMINAL SURGERY      APPENDECTOMY       SECTION      CHOLECYSTECTOMY      COSMETIC SURGERY      \"tummy tuck\"    FL GUIDED CENTRAL VENOUS ACCESS DEVICE INSERTION  2018    FL GUIDED CENTRAL VENOUS ACCESS DEVICE INSERTION  2024    FL RETROGRADE PYELOGRAM  1/3/2023    GASTRIC BYPASS      HYSTERECTOMY      LAPAROSCOPY      IA ANRCT XM SURG REQ ANES GENERAL SPI/EDRL DX N/A 2021    Procedure: ANAL EXAM UNDER ANESTHESIA; HEMORRHOIDECTOMY;  Surgeon: Dona Jeffries DO;  Location: [facility];  Service: General    IA CYSTO/URETERO W/LITHOTRIPSY &INDWELL STENT INSRT Left 1/3/2023    Procedure: CYSTOSCOPY URETEROSCOPY WITH RETROGRADE PYELOGRAM, BASKET STONE EXTRACTION AND INSERTION STENT URETERAL;  Surgeon: Geovanny Rosales MD;  Location: [facility];  Service: Urology    IA EXC THROMBOSED HEMORRHOID XTRNL N/A 2022    Procedure: EXCISION OF THROMBOSED EXTERNAL HEMORRHOID, Excision of perianal skin tag, dranage of perianal abscess, anal fistulatomy;  Surgeon: Dona Jeffries DO;  Location: [facility];  Service: General    IA INSJ TUNNELED CTR VAD W/SUBQ PORT AGE 5 YR/> Left 2024    Procedure: INSERTION VENOUS PORT (PORT-A-CATH);  Surgeon: Ryan Singh DO;  Location: [facility];  Service: General    IA RMVL AMIRAH CTR VAD W/SUBQ PORT/ CTR/PRPH INSJ Right 2024    Procedure: REMOVAL VENOUS PORT (PORT-A-CATH);  Surgeon: Ryan Singh DO;  " "Location: [facility];  Service: General    TUNNELED VENOUS PORT PLACEMENT N/A 12/21/2018    Procedure: INSERTION VENOUS PORT (PORT-A-CATH);  Surgeon: Ryan Singh DO;  Location: [facility];  Service: General       Family History   Problem Relation Age of Onset    No Known Problems Mother     Hypertension Father     Diabetes Father     Urolithiasis Father     Prostate cancer Father     Diabetes Maternal Grandfather          Medications have been verified.        Objective   /51   Pulse 79   Temp 97.5 °F (36.4 °C)   Resp 12   Ht 5' 2.5\" (1.588 m)   Wt 50.3 kg (110 lb 12.8 oz)   LMP  (LMP Unknown)   SpO2 100%   BMI 19.94 kg/m²   No LMP recorded (lmp unknown). Patient has had a hysterectomy.       Physical Exam     Physical Exam  Vitals and nursing note reviewed.   Constitutional:       General: She is not in acute distress.     Appearance: She is not toxic-appearing.   HENT:      Head: Normocephalic.      Right Ear: Tympanic membrane, ear canal and external ear normal.      Left Ear: Tympanic membrane, ear canal and external ear normal.      Nose: Congestion present.      Right Sinus: No maxillary sinus tenderness or frontal sinus tenderness.      Left Sinus: Frontal sinus tenderness present. No maxillary sinus tenderness.      Mouth/Throat:      Mouth: Mucous membranes are moist.      Pharynx: Posterior oropharyngeal erythema present.      Tonsils: No tonsillar exudate. 0 on the right. 0 on the left.   Eyes:      Conjunctiva/sclera: Conjunctivae normal.   Cardiovascular:      Rate and Rhythm: Normal rate and regular rhythm.      Heart sounds: Normal heart sounds.   Pulmonary:      Effort: Pulmonary effort is normal. No respiratory distress.      Breath sounds: Normal breath sounds. No stridor. No wheezing, rhonchi or rales.   Lymphadenopathy:      Cervical: No cervical adenopathy.   Skin:     General: Skin is warm and dry.   Neurological:      Mental Status: She is alert and oriented to person, place, " and time.      Gait: Gait is intact.   Psychiatric:         Mood and Affect: Mood normal.         Behavior: Behavior normal.

## 2024-03-10 ENCOUNTER — HOSPITAL ENCOUNTER (INPATIENT)
Facility: HOSPITAL | Age: 39
LOS: 3 days | Discharge: HOME | DRG: 282 | End: 2024-03-13
Attending: EMERGENCY MEDICINE | Admitting: FAMILY MEDICINE
Payer: COMMERCIAL

## 2024-03-10 ENCOUNTER — APPOINTMENT (EMERGENCY)
Dept: CT IMAGING | Facility: HOSPITAL | Age: 39
DRG: 282 | End: 2024-03-10
Payer: COMMERCIAL

## 2024-03-10 DIAGNOSIS — N20.1 RIGHT URETERAL STONE: ICD-10-CM

## 2024-03-10 DIAGNOSIS — K85.90 ACUTE PANCREATITIS, UNSPECIFIED COMPLICATION STATUS, UNSPECIFIED PANCREATITIS TYPE: ICD-10-CM

## 2024-03-10 DIAGNOSIS — E61.1 IRON DEFICIENCY: ICD-10-CM

## 2024-03-10 DIAGNOSIS — D50.8 IRON DEFICIENCY ANEMIA DUE TO DIETARY CAUSES: ICD-10-CM

## 2024-03-10 DIAGNOSIS — K85.90 PANCREATITIS: Primary | ICD-10-CM

## 2024-03-10 LAB
ALBUMIN SERPL BCP-MCNC: 3.9 G/DL (ref 3.5–5)
ALP SERPL-CCNC: 37 U/L (ref 34–104)
ALT SERPL W P-5'-P-CCNC: 11 U/L (ref 7–52)
ANION GAP SERPL CALCULATED.3IONS-SCNC: 5 MMOL/L
AST SERPL W P-5'-P-CCNC: 15 U/L (ref 13–39)
BACTERIA UR QL AUTO: NORMAL /HPF
BASOPHILS # BLD AUTO: 0.07 THOUSANDS/ÂΜL (ref 0–0.1)
BASOPHILS NFR BLD AUTO: 1 % (ref 0–1)
BILIRUB SERPL-MCNC: 0.3 MG/DL (ref 0.2–1)
BILIRUB UR QL STRIP: ABNORMAL
BUN SERPL-MCNC: 15 MG/DL (ref 5–25)
CALCIUM SERPL-MCNC: 8.3 MG/DL (ref 8.4–10.2)
CHLORIDE SERPL-SCNC: 110 MMOL/L (ref 96–108)
CLARITY UR: ABNORMAL
CO2 SERPL-SCNC: 19 MMOL/L (ref 21–32)
COLOR UR: YELLOW
CREAT SERPL-MCNC: 0.9 MG/DL (ref 0.6–1.3)
EOSINOPHIL # BLD AUTO: 0.14 THOUSAND/ÂΜL (ref 0–0.61)
EOSINOPHIL NFR BLD AUTO: 2 % (ref 0–6)
ERYTHROCYTE [DISTWIDTH] IN BLOOD BY AUTOMATED COUNT: 19.4 % (ref 11.6–15.1)
FERRITIN SERPL-MCNC: 2 NG/ML (ref 11–307)
GFR SERPL CREATININE-BSD FRML MDRD: 80 ML/MIN/1.73SQ M
GLUCOSE SERPL-MCNC: 96 MG/DL (ref 65–140)
GLUCOSE UR STRIP-MCNC: NEGATIVE MG/DL
HCT VFR BLD AUTO: 27.4 % (ref 34.8–46.1)
HGB BLD-MCNC: 7.5 G/DL (ref 11.5–15.4)
HGB UR QL STRIP.AUTO: NEGATIVE
IMM GRANULOCYTES # BLD AUTO: 0.02 THOUSAND/UL (ref 0–0.2)
IMM GRANULOCYTES NFR BLD AUTO: 0 % (ref 0–2)
IRON SATN MFR SERPL: 4 % (ref 15–50)
IRON SERPL-MCNC: 19 UG/DL (ref 50–212)
KETONES UR STRIP-MCNC: ABNORMAL MG/DL
LEUKOCYTE ESTERASE UR QL STRIP: NEGATIVE
LIPASE SERPL-CCNC: 510 U/L (ref 11–82)
LYMPHOCYTES # BLD AUTO: 1.91 THOUSANDS/ÂΜL (ref 0.6–4.47)
LYMPHOCYTES NFR BLD AUTO: 27 % (ref 14–44)
MCH RBC QN AUTO: 17.6 PG (ref 26.8–34.3)
MCHC RBC AUTO-ENTMCNC: 27.4 G/DL (ref 31.4–37.4)
MCV RBC AUTO: 65 FL (ref 82–98)
MONOCYTES # BLD AUTO: 0.65 THOUSAND/ÂΜL (ref 0.17–1.22)
MONOCYTES NFR BLD AUTO: 9 % (ref 4–12)
NEUTROPHILS # BLD AUTO: 4.36 THOUSANDS/ÂΜL (ref 1.85–7.62)
NEUTS SEG NFR BLD AUTO: 61 % (ref 43–75)
NITRITE UR QL STRIP: NEGATIVE
NON-SQ EPI CELLS URNS QL MICRO: NORMAL /HPF
NRBC BLD AUTO-RTO: 0 /100 WBCS
PH UR STRIP.AUTO: 6 [PH]
PLATELET # BLD AUTO: 306 THOUSANDS/UL (ref 149–390)
PMV BLD AUTO: 9.6 FL (ref 8.9–12.7)
POTASSIUM SERPL-SCNC: 3.7 MMOL/L (ref 3.5–5.3)
PROT SERPL-MCNC: 6.9 G/DL (ref 6.4–8.4)
PROT UR STRIP-MCNC: ABNORMAL MG/DL
RBC # BLD AUTO: 4.25 MILLION/UL (ref 3.81–5.12)
RBC #/AREA URNS AUTO: NORMAL /HPF
SODIUM SERPL-SCNC: 134 MMOL/L (ref 135–147)
SP GR UR STRIP.AUTO: >=1.03 (ref 1–1.03)
TIBC SERPL-MCNC: 448 UG/DL (ref 250–450)
TRIGL SERPL-MCNC: 96 MG/DL
UIBC SERPL-MCNC: 429 UG/DL (ref 155–355)
UROBILINOGEN UR QL STRIP.AUTO: 0.2 E.U./DL
VIT B12 SERPL-MCNC: 101 PG/ML (ref 180–914)
WBC # BLD AUTO: 7.15 THOUSAND/UL (ref 4.31–10.16)
WBC #/AREA URNS AUTO: NORMAL /HPF

## 2024-03-10 PROCEDURE — 82728 ASSAY OF FERRITIN: CPT | Performed by: FAMILY MEDICINE

## 2024-03-10 PROCEDURE — 84478 ASSAY OF TRIGLYCERIDES: CPT | Performed by: FAMILY MEDICINE

## 2024-03-10 PROCEDURE — 96361 HYDRATE IV INFUSION ADD-ON: CPT

## 2024-03-10 PROCEDURE — 99222 1ST HOSP IP/OBS MODERATE 55: CPT | Performed by: FAMILY MEDICINE

## 2024-03-10 PROCEDURE — 83690 ASSAY OF LIPASE: CPT | Performed by: EMERGENCY MEDICINE

## 2024-03-10 PROCEDURE — 80053 COMPREHEN METABOLIC PANEL: CPT | Performed by: EMERGENCY MEDICINE

## 2024-03-10 PROCEDURE — 85025 COMPLETE CBC W/AUTO DIFF WBC: CPT | Performed by: EMERGENCY MEDICINE

## 2024-03-10 PROCEDURE — 82607 VITAMIN B-12: CPT | Performed by: FAMILY MEDICINE

## 2024-03-10 PROCEDURE — 83540 ASSAY OF IRON: CPT | Performed by: FAMILY MEDICINE

## 2024-03-10 PROCEDURE — 74176 CT ABD & PELVIS W/O CONTRAST: CPT

## 2024-03-10 PROCEDURE — 96375 TX/PRO/DX INJ NEW DRUG ADDON: CPT

## 2024-03-10 PROCEDURE — 99284 EMERGENCY DEPT VISIT MOD MDM: CPT

## 2024-03-10 PROCEDURE — 36415 COLL VENOUS BLD VENIPUNCTURE: CPT | Performed by: EMERGENCY MEDICINE

## 2024-03-10 PROCEDURE — 99285 EMERGENCY DEPT VISIT HI MDM: CPT | Performed by: EMERGENCY MEDICINE

## 2024-03-10 PROCEDURE — 83550 IRON BINDING TEST: CPT | Performed by: FAMILY MEDICINE

## 2024-03-10 PROCEDURE — 81001 URINALYSIS AUTO W/SCOPE: CPT | Performed by: EMERGENCY MEDICINE

## 2024-03-10 PROCEDURE — 96374 THER/PROPH/DIAG INJ IV PUSH: CPT

## 2024-03-10 RX ORDER — ESCITALOPRAM OXALATE 10 MG/1
20 TABLET ORAL
Status: DISCONTINUED | OUTPATIENT
Start: 2024-03-10 | End: 2024-03-13 | Stop reason: HOSPADM

## 2024-03-10 RX ORDER — GABAPENTIN 300 MG/1
300 CAPSULE ORAL 3 TIMES DAILY
Status: DISCONTINUED | OUTPATIENT
Start: 2024-03-10 | End: 2024-03-13 | Stop reason: HOSPADM

## 2024-03-10 RX ORDER — FENTANYL CITRATE 50 UG/ML
50 INJECTION, SOLUTION INTRAMUSCULAR; INTRAVENOUS EVERY 4 HOURS PRN
Status: DISCONTINUED | OUTPATIENT
Start: 2024-03-10 | End: 2024-03-13

## 2024-03-10 RX ORDER — ONDANSETRON 2 MG/ML
4 INJECTION INTRAMUSCULAR; INTRAVENOUS EVERY 6 HOURS PRN
Status: DISCONTINUED | OUTPATIENT
Start: 2024-03-10 | End: 2024-03-13 | Stop reason: HOSPADM

## 2024-03-10 RX ORDER — SODIUM CHLORIDE 9 MG/ML
125 INJECTION, SOLUTION INTRAVENOUS CONTINUOUS
Status: DISCONTINUED | OUTPATIENT
Start: 2024-03-10 | End: 2024-03-11

## 2024-03-10 RX ORDER — FENTANYL CITRATE 50 UG/ML
25 INJECTION, SOLUTION INTRAMUSCULAR; INTRAVENOUS ONCE
Status: COMPLETED | OUTPATIENT
Start: 2024-03-10 | End: 2024-03-10

## 2024-03-10 RX ORDER — ENOXAPARIN SODIUM 100 MG/ML
40 INJECTION SUBCUTANEOUS DAILY
Status: DISCONTINUED | OUTPATIENT
Start: 2024-03-11 | End: 2024-03-13 | Stop reason: HOSPADM

## 2024-03-10 RX ORDER — AMITRIPTYLINE HYDROCHLORIDE 10 MG/1
20 TABLET, FILM COATED ORAL
Status: DISCONTINUED | OUTPATIENT
Start: 2024-03-10 | End: 2024-03-13 | Stop reason: HOSPADM

## 2024-03-10 RX ORDER — ONDANSETRON 2 MG/ML
4 INJECTION INTRAMUSCULAR; INTRAVENOUS ONCE
Status: COMPLETED | OUTPATIENT
Start: 2024-03-10 | End: 2024-03-10

## 2024-03-10 RX ORDER — POTASSIUM CHLORIDE 14.9 MG/ML
20 INJECTION INTRAVENOUS
Qty: 200 ML | Refills: 0 | Status: COMPLETED | OUTPATIENT
Start: 2024-03-10 | End: 2024-03-10

## 2024-03-10 RX ORDER — MORPHINE SULFATE 4 MG/ML
4 INJECTION, SOLUTION INTRAMUSCULAR; INTRAVENOUS ONCE
Status: COMPLETED | OUTPATIENT
Start: 2024-03-10 | End: 2024-03-10

## 2024-03-10 RX ORDER — ACETAMINOPHEN 325 MG/1
650 TABLET ORAL EVERY 6 HOURS PRN
Status: DISCONTINUED | OUTPATIENT
Start: 2024-03-10 | End: 2024-03-13 | Stop reason: HOSPADM

## 2024-03-10 RX ADMIN — POTASSIUM CHLORIDE 20 MEQ: 14.9 INJECTION, SOLUTION INTRAVENOUS at 19:25

## 2024-03-10 RX ADMIN — GABAPENTIN 300 MG: 300 CAPSULE ORAL at 17:35

## 2024-03-10 RX ADMIN — MORPHINE SULFATE 4 MG: 4 INJECTION INTRAVENOUS at 14:27

## 2024-03-10 RX ADMIN — ONDANSETRON 4 MG: 2 INJECTION INTRAMUSCULAR; INTRAVENOUS at 14:26

## 2024-03-10 RX ADMIN — SODIUM CHLORIDE 1000 ML: 0.9 INJECTION, SOLUTION INTRAVENOUS at 14:26

## 2024-03-10 RX ADMIN — FENTANYL CITRATE 50 MCG: 50 INJECTION INTRAMUSCULAR; INTRAVENOUS at 21:45

## 2024-03-10 RX ADMIN — POTASSIUM CHLORIDE 20 MEQ: 14.9 INJECTION, SOLUTION INTRAVENOUS at 17:39

## 2024-03-10 RX ADMIN — ESCITALOPRAM OXALATE 20 MG: 10 TABLET ORAL at 22:46

## 2024-03-10 RX ADMIN — FENTANYL CITRATE 50 MCG: 50 INJECTION INTRAMUSCULAR; INTRAVENOUS at 17:46

## 2024-03-10 RX ADMIN — GABAPENTIN 300 MG: 300 CAPSULE ORAL at 22:46

## 2024-03-10 RX ADMIN — SODIUM CHLORIDE 125 ML/HR: 0.9 INJECTION, SOLUTION INTRAVENOUS at 17:35

## 2024-03-10 RX ADMIN — AMITRIPTYLINE HYDROCHLORIDE 20 MG: 10 TABLET, FILM COATED ORAL at 22:46

## 2024-03-10 RX ADMIN — FENTANYL CITRATE 25 MCG: 50 INJECTION INTRAMUSCULAR; INTRAVENOUS at 15:47

## 2024-03-10 NOTE — H&P
[facility]  H&P  Name: Brittani Patino 39 y.o. female I MRN: [id number]  Unit/Bed#: -01 I Date of Admission: 3/10/2024   Date of Service: 3/10/2024 I Hospital Day: 0      Assessment/Plan   * Idiopathic acute pancreatitis without infection or necrosis  Assessment & Plan  Presents with left-sided flank pain and epigastric pain  And noted to have lipase of 510  And recently had a sinus infection and took a course of Augmentin and now currently on antifungal agents probably medication induced pancreatitis.  Will place on full liquid diet toast and crackers IV fluid hydration check triglyceride level and repeat labs in a.m. and monitor for now  cont pain control    Acute on chronic anemia  Assessment & Plan  baseline hemoglobin is around 9-11.  hb down to 7.5 will check iron and B12 levels and recheck CBC in a.m. transfuse if hemoglobin is less than 7    Hypokalemia  Assessment & Plan  And has chronic hypokalemia and takes 80 mEq of IV KCl daily infusion.  Will place on 40 mEq now and recheck BMP in a.m. and give further potassium again tomorrow.  Check magnesium level also    B12 deficiency  Assessment & Plan  Cont b12 supplementation         VTE Prophylaxis: Enoxaparin (Lovenox)  / sequential compression device   Code Status: full code  POLST: There is no POLST form on file for this patient (pre-hospital)  Discussion with family: none    Anticipated Length of Stay:  Patient will be admitted on an Inpatient basis with an anticipated length of stay of  more than 2 midnights.   Justification for Hospital Stay: acute Pancreatitis    Total Time for Visit, including Counseling / Coordination of Care: 60 minutes.  Greater than 50% of this total time spent on direct patient counseling and coordination of care.    Chief Complaint:   Abdominal pain    History of Present Illness:    Brittani Patino is a 39 y.o. female who presents with abdominal pain.  Patient states that 2 days ago she started  experiencing some left-sided flank pain she initially thought that she had a kidney stone and then she started having more epigastric pain along with decreased oral intake nausea vomiting and came to the ED to be evaluated.  States that she recently had a sinus infection and took antibiotics followed by now antivirals..    Review of Systems:    Review of Systems   Constitutional:  Positive for appetite change. Negative for chills, fatigue and fever.   HENT:  Negative for hearing loss, sore throat and trouble swallowing.    Eyes:  Negative for photophobia, discharge and visual disturbance.   Respiratory:  Negative for chest tightness and shortness of breath.    Cardiovascular:  Negative for chest pain and palpitations.   Gastrointestinal:  Positive for abdominal pain. Negative for blood in stool and vomiting.   Endocrine: Negative for polydipsia and polyuria.   Genitourinary:  Negative for difficulty urinating, dysuria, flank pain and hematuria.   Musculoskeletal:  Negative for back pain and gait problem.   Skin:  Negative for rash.   Allergic/Immunologic: Negative for environmental allergies and food allergies.   Neurological:  Negative for dizziness, seizures, syncope and headaches.   Hematological:  Does not bruise/bleed easily.   Psychiatric/Behavioral:  Negative for behavioral problems.    All other systems reviewed and are negative.      Past Medical and Surgical History:     Past Medical History:   Diagnosis Date    C. difficile colitis 2016    COVID-19     1/2022- pt denies long covid    DUB (dysfunctional uterine bleeding)     H. pylori infection     Hypertension     Hypokalemia     Kidney stone November 2023    Left lower quadrant abdominal pain 08/17/2020    Migraine     Pancreatitis     Psychiatric disorder     Anxiety    Rectal bleeding     Renal disorder     Rhabdomyolysis     Sleep apnea     resolved with bariatric surgery    Thrombosed external hemorrhoid        Past Surgical History:   Procedure  "Laterality Date    ABDOMINAL SURGERY      APPENDECTOMY       SECTION      CHOLECYSTECTOMY      COSMETIC SURGERY      \"tummy tuck\"    FL GUIDED CENTRAL VENOUS ACCESS DEVICE INSERTION  2018    FL GUIDED CENTRAL VENOUS ACCESS DEVICE INSERTION  2024    FL RETROGRADE PYELOGRAM  1/3/2023    GASTRIC BYPASS      HYSTERECTOMY      LAPAROSCOPY      IA ANRCT XM SURG REQ ANES GENERAL SPI/EDRL DX N/A 2021    Procedure: ANAL EXAM UNDER ANESTHESIA; HEMORRHOIDECTOMY;  Surgeon: Dona Jeffries DO;  Location: [facility];  Service: General    IA CYSTO/URETERO W/LITHOTRIPSY &INDWELL STENT INSRT Left 1/3/2023    Procedure: CYSTOSCOPY URETEROSCOPY WITH RETROGRADE PYELOGRAM, BASKET STONE EXTRACTION AND INSERTION STENT URETERAL;  Surgeon: Geovanny Rosales MD;  Location: [facility];  Service: Urology    IA EXC THROMBOSED HEMORRHOID XTRNL N/A 2022    Procedure: EXCISION OF THROMBOSED EXTERNAL HEMORRHOID, Excision of perianal skin tag, dranage of perianal abscess, anal fistulatomy;  Surgeon: Dona Jeffries DO;  Location: [facility];  Service: General    IA INSJ TUNNELED CTR VAD W/SUBQ PORT AGE 5 YR/> Left 2024    Procedure: INSERTION VENOUS PORT (PORT-A-CATH);  Surgeon: Ryan Singh DO;  Location: [facility];  Service: General    IA RMVL AMIRAH CTR VAD W/SUBQ PORT/ CTR/PRPH INSJ Right 2024    Procedure: REMOVAL VENOUS PORT (PORT-A-CATH);  Surgeon: Ryan Singh DO;  Location: [facility];  Service: General    TUNNELED VENOUS PORT PLACEMENT N/A 2018    Procedure: INSERTION VENOUS PORT (PORT-A-CATH);  Surgeon: Ryan Singh DO;  Location: [facility];  Service: General       Meds/Allergies:    Prior to Admission medications    Medication Sig Start Date End Date Taking? Authorizing Provider   acetaminophen (TYLENOL) 325 mg tablet  23   Historical Provider, MD   amitriptyline (ELAVIL) 10 mg tablet Take 2 tablets (20 mg total) by mouth daily at bedtime 22   Handy Bhatia DO " "  amoxicillin-clavulanate (AUGMENTIN) 875-125 mg per tablet Take 1 tablet by mouth every 12 (twelve) hours for 7 days 3/6/24 3/13/24  ARIADNA Aleman   cyanocobalamin 1,000 mcg/mL Inject 1 mL (1,000 mcg total) into a muscle every 30 (thirty) days Next dose due 5/1/2023 4/18/23   Pallavi Argueta MD   dicyclomine (BENTYL) 10 mg capsule Take 10 mg by mouth if needed 10/10/23   Historical Provider, MD   ergocalciferol (ERGOCALCIFEROL) 1.25 MG (92089 UT) capsule Take 1 capsule (50,000 Units total) by mouth 2 (two) times a week Mondays and Thursdays 2/26/24   Juan Carlos Ricketts DO   escitalopram (LEXAPRO) 20 mg tablet 20 mg daily at bedtime 3/14/23   Historical Provider, MD   gabapentin (Neurontin) 300 mg capsule Take 1 capsule (300 mg total) by mouth 3 (three) times a day 2/12/24   Ryan Singh DO   methocarbamol (ROBAXIN) 500 mg tablet Take 1 tablet (500 mg total) by mouth 4 (four) times a day 2/12/24   Ryan Singh DO   ondansetron (ZOFRAN-ODT) 4 mg disintegrating tablet  6/12/23   Historical Provider, MD   oxyCODONE-acetaminophen (PERCOCET) 5-325 mg per tablet Take 1 tablet by mouth every 4 (four) hours as needed for moderate pain for up to 10 doses Max Daily Amount: 6 tablets  Patient not taking: Reported on 3/6/2024 2/18/24   Raul Pickens MD   potassium chloride 0.4 mEq/mL Inject 200 mL (80 mEq total) into a catheter in a vein over 8 hours at 25 mL/hr daily 80 meq in 1 litre bag to be infused daily over 8 hours   Continue as ordered prior to admission 5/26/23   Pebbles Jiménez MD   Syringe/Needle, Disp, (SYRINGE 3CC/25GX5/8\") 25G X 5/8\" 3 ML MISC Use once monthly for B12 injection. 6/1/20   Juan Carlos Varvarelis, DO   tamsulosin (FLOMAX) 0.4 mg Take 1 capsule (0.4 mg total) by mouth daily with dinner for 7 days  Patient taking differently: Take 0.4 mg by mouth daily with dinner 2/18/24 2/25/24  Raul Pickens MD   vitamin A 3 MG (20169 UT) capsule Take 1 capsule by mouth 2 (two) " "times a day 6/22/23   Historical Provider, MD   vitamin k 100 MCG tablet Take 1 tablet by mouth daily 6/20/23   Historical Provider, MD     I have reviewed home medications with patient personally.    Allergies:   Allergies   Allergen Reactions    Nsaids Other (See Comments)     Other reaction(s): Other (Please comment)  Should not take s/p bariatric surgery  Other reaction(s): Other (See Comments)  Should not take as per prior records  Should not take s/p bariatric surgery  Has hx of gastric bypass      Prednisone      Nausea, vomiting, diarrhea       Social History:     Marital Status: /Civil Union     Social History     Substance and Sexual Activity   Alcohol Use Not Currently     Social History     Tobacco Use   Smoking Status Never   Smokeless Tobacco Never     Social History     Substance and Sexual Activity   Drug Use Not Currently    Types: Marijuana    Comment: Medical marijuana       Family History:    Family History   Problem Relation Age of Onset    No Known Problems Mother     Hypertension Father     Diabetes Father     Urolithiasis Father     Prostate cancer Father     Diabetes Maternal Grandfather        Physical Exam:     Vitals:   Blood Pressure: 102/52 (03/10/24 1530)  Pulse: 63 (03/10/24 1530)  Temperature: 98.2 °F (36.8 °C) (03/10/24 1404)  Respirations: 18 (03/10/24 1530)  Height: 5' 2\" (157.5 cm) (03/10/24 1404)  Weight - Scale: 52 kg (114 lb 10.2 oz) (03/10/24 1404)  SpO2: 98 % (03/10/24 1530)    Physical Exam  Vitals and nursing note reviewed.   Constitutional:       Appearance: She is ill-appearing.   HENT:      Head: Normocephalic and atraumatic.      Right Ear: External ear normal.      Left Ear: External ear normal.      Nose: Nose normal.      Mouth/Throat:      Pharynx: Oropharynx is clear.   Eyes:      Pupils: Pupils are equal, round, and reactive to light.   Cardiovascular:      Rate and Rhythm: Normal rate and regular rhythm.      Heart sounds: Normal heart sounds. "   Pulmonary:      Effort: Pulmonary effort is normal.      Breath sounds: Normal breath sounds.   Abdominal:      General: Bowel sounds are normal.      Palpations: Abdomen is soft.      Tenderness: There is abdominal tenderness.   Musculoskeletal:         General: Normal range of motion.      Cervical back: Normal range of motion and neck supple.   Skin:     General: Skin is warm and dry.      Capillary Refill: Capillary refill takes less than 2 seconds.   Neurological:      General: No focal deficit present.      Mental Status: She is alert and oriented to person, place, and time.   Psychiatric:         Mood and Affect: Mood normal.           Additional Data:     Lab Results: I have personally reviewed pertinent reports.      Results from last 7 days   Lab Units 03/10/24  1434   WBC Thousand/uL 7.15   HEMOGLOBIN g/dL 7.5*   HEMATOCRIT % 27.4*   PLATELETS Thousands/uL 306   NEUTROS PCT % 61   LYMPHS PCT % 27   MONOS PCT % 9   EOS PCT % 2     Results from last 7 days   Lab Units 03/10/24  1434   SODIUM mmol/L 134*   POTASSIUM mmol/L 3.7   CHLORIDE mmol/L 110*   CO2 mmol/L 19*   BUN mg/dL 15   CREATININE mg/dL 0.90   ANION GAP mmol/L 5   CALCIUM mg/dL 8.3*   ALBUMIN g/dL 3.9   TOTAL BILIRUBIN mg/dL 0.30   ALK PHOS U/L 37   ALT U/L 11   AST U/L 15   GLUCOSE RANDOM mg/dL 96                       Imaging: I have personally reviewed pertinent reports.      CT renal stone study abdomen pelvis without contrast   Final Result by Sophie Richmond MD (03/10 1533)      Bilateral nonobstructing renal calculi.            Workstation performed: [id number]             EKG, Pathology, and Other Studies Reviewed on Admission:   EKG: reviewed    [facility] / [facility] Records Reviewed: Yes     ** Please Note: This note has been constructed using a voice recognition system. **

## 2024-03-10 NOTE — PLAN OF CARE
Problem: GASTROINTESTINAL - ADULT  Goal: Minimal or absence of nausea and/or vomiting  Description: INTERVENTIONS:  - Administer IV fluids if ordered to ensure adequate hydration  - Maintain NPO status until nausea and vomiting are resolved  - Nasogastric tube if ordered  - Administer ordered antiemetic medications as needed  - Provide nonpharmacologic comfort measures as appropriate  - Advance diet as tolerated, if ordered  - Consider nutrition services referral to assist patient with adequate nutrition and appropriate food choices  Outcome: Progressing     Problem: GASTROINTESTINAL - ADULT  Goal: Maintains or returns to baseline bowel function  Description: INTERVENTIONS:  - Assess bowel function  - Encourage oral fluids to ensure adequate hydration  - Administer IV fluids if ordered to ensure adequate hydration  - Administer ordered medications as needed  - Encourage mobilization and activity  - Consider nutritional services referral to assist patient with adequate nutrition and appropriate food choices  Outcome: Progressing     Problem: GENITOURINARY - ADULT  Goal: Maintains or returns to baseline urinary function  Description: INTERVENTIONS:  - Assess urinary function  - Encourage oral fluids to ensure adequate hydration if ordered  - Administer IV fluids as ordered to ensure adequate hydration  - Administer ordered medications as needed  - Offer frequent toileting  - Follow urinary retention protocol if ordered  Outcome: Progressing

## 2024-03-10 NOTE — ASSESSMENT & PLAN NOTE
Presents with left-sided flank pain and epigastric pain  And noted to have lipase of 510  And recently had a sinus infection and took a course of Augmentin and now currently on antifungal agents probably medication induced pancreatitis.  Will place on full liquid diet toast and crackers IV fluid hydration check triglyceride level and repeat labs in a.m. and monitor for now  cont pain control

## 2024-03-10 NOTE — ED PROVIDER NOTES
"History  Chief Complaint   Patient presents with    Flank Pain     Pt arrives reporting left flank pain increasing over past 3 days.  Pt reports hx of kidney stones and reports this feels similar.  Pt reports nausea and diarrhea at present.      Patient is a 39-year-old female presenting to the emergency department complaining of left flank pain that started bothering her on Friday and worsened significantly over the past few days, she reports associated nausea and vomiting, as well as pressure with urination, patient has a history of kidney stones with similar symptoms though she denies any fever, no hematuria noted        Prior to Admission Medications   Prescriptions Last Dose Informant Patient Reported? Taking?   Syringe/Needle, Disp, (SYRINGE 3CC/25GX5/8\") 25G X 5/8\" 3 ML MISC  Self No No   Sig: Use once monthly for B12 injection.   acetaminophen (TYLENOL) 325 mg tablet  Self Yes No   amitriptyline (ELAVIL) 10 mg tablet  Self No No   Sig: Take 2 tablets (20 mg total) by mouth daily at bedtime   amoxicillin-clavulanate (AUGMENTIN) 875-125 mg per tablet   No No   Sig: Take 1 tablet by mouth every 12 (twelve) hours for 7 days   cyanocobalamin 1,000 mcg/mL  Self No No   Sig: Inject 1 mL (1,000 mcg total) into a muscle every 30 (thirty) days Next dose due 2023   dicyclomine (BENTYL) 10 mg capsule  Self Yes No   Sig: Take 10 mg by mouth if needed   ergocalciferol (ERGOCALCIFEROL) 1.25 MG (58810 UT) capsule   No No   Sig: Take 1 capsule (50,000 Units total) by mouth 2 (two) times a week  and    escitalopram (LEXAPRO) 20 mg tablet  Self Yes No   Si mg daily at bedtime   gabapentin (Neurontin) 300 mg capsule   No No   Sig: Take 1 capsule (300 mg total) by mouth 3 (three) times a day   methocarbamol (ROBAXIN) 500 mg tablet   No No   Sig: Take 1 tablet (500 mg total) by mouth 4 (four) times a day   ondansetron (ZOFRAN-ODT) 4 mg disintegrating tablet  Self Yes No   oxyCODONE-acetaminophen " "(PERCOCET) 5-325 mg per tablet   No No   Sig: Take 1 tablet by mouth every 4 (four) hours as needed for moderate pain for up to 10 doses Max Daily Amount: 6 tablets   Patient not taking: Reported on 3/6/2024   potassium chloride 0.4 mEq/mL  Self No No   Sig: Inject 200 mL (80 mEq total) into a catheter in a vein over 8 hours at 25 mL/hr daily 80 meq in 1 litre bag to be infused daily over 8 hours   Continue as ordered prior to admission   tamsulosin (FLOMAX) 0.4 mg   No No   Sig: Take 1 capsule (0.4 mg total) by mouth daily with dinner for 7 days   Patient taking differently: Take 0.4 mg by mouth daily with dinner   vitamin A 3 MG (26286 UT) capsule  Self Yes No   Sig: Take 1 capsule by mouth 2 (two) times a day   vitamin k 100 MCG tablet  Self Yes No   Sig: Take 1 tablet by mouth daily      Facility-Administered Medications: None       Past Medical History:   Diagnosis Date    C. difficile colitis 2016    COVID-19     2022- pt denies long covid    DUB (dysfunctional uterine bleeding)     H. pylori infection     Hypertension     Hypokalemia     Kidney stone 2023    Left lower quadrant abdominal pain 2020    Migraine     Pancreatitis     Psychiatric disorder     Anxiety    Rectal bleeding     Renal disorder     Rhabdomyolysis     Sleep apnea     resolved with bariatric surgery    Thrombosed external hemorrhoid        Past Surgical History:   Procedure Laterality Date    ABDOMINAL SURGERY      APPENDECTOMY       SECTION      CHOLECYSTECTOMY      COSMETIC SURGERY      \"tummy tuck\"    FL GUIDED CENTRAL VENOUS ACCESS DEVICE INSERTION  2018    FL GUIDED CENTRAL VENOUS ACCESS DEVICE INSERTION  2024    FL RETROGRADE PYELOGRAM  1/3/2023    GASTRIC BYPASS      HYSTERECTOMY      LAPAROSCOPY      GA ANRCT XM SURG REQ ANES GENERAL SPI/EDRL DX N/A 2021    Procedure: ANAL EXAM UNDER ANESTHESIA; HEMORRHOIDECTOMY;  Surgeon: Dona Jeffries DO;  Location:  MAIN OR;  Service: General "    IL CYSTO/URETERO W/LITHOTRIPSY &INDWELL STENT INSRT Left 1/3/2023    Procedure: CYSTOSCOPY URETEROSCOPY WITH RETROGRADE PYELOGRAM, BASKET STONE EXTRACTION AND INSERTION STENT URETERAL;  Surgeon: Geovanny Rosales MD;  Location: [facility];  Service: Urology    IL EXC THROMBOSED HEMORRHOID XTRNL N/A 7/8/2022    Procedure: EXCISION OF THROMBOSED EXTERNAL HEMORRHOID, Excision of perianal skin tag, dranage of perianal abscess, anal fistulatomy;  Surgeon: Dona Jeffries DO;  Location:  MAIN OR;  Service: General    IL INSJ TUNNELED CTR VAD W/SUBQ PORT AGE 4 YR/> Left 2/9/2024    Procedure: INSERTION VENOUS PORT (PORT-A-CATH);  Surgeon: Ryan Singh DO;  Location: [facility];  Service: General    IL RMVL AMIRAH CTR VAD W/SUBQ PORT/ CTR/PRPH INSJ Right 2/9/2024    Procedure: REMOVAL VENOUS PORT (PORT-A-CATH);  Surgeon: Ryan Singh DO;  Location: [facility];  Service: General    TUNNELED VENOUS PORT PLACEMENT N/A 12/21/2018    Procedure: INSERTION VENOUS PORT (PORT-A-CATH);  Surgeon: Ryan Singh DO;  [facility]: [facility];  Service: General       Family History   Problem Relation Age of Onset    No Known Problems Mother     Hypertension Father     Diabetes Father     Urolithiasis Father     Prostate cancer Father     Diabetes Maternal Grandfather      I have reviewed and agree with the history as documented.    E-Cigarette/Vaping    E-Cigarette Use Never User      E-Cigarette/Vaping Substances    Nicotine No     THC No     CBD No     Flavoring No     Other No     Unknown No      Social History     Tobacco Use    Smoking status: Never    Smokeless tobacco: Never   Vaping Use    Vaping status: Never Used   Substance Use Topics    Alcohol use: Not Currently    Drug use: Not Currently     Types: Marijuana     Comment: Medical marijuana       Review of Systems   Constitutional: Negative.    HENT: Negative.     Eyes: Negative.    Respiratory: Negative.     Cardiovascular: Negative.    Gastrointestinal:   Positive for nausea and vomiting.   Endocrine: Negative.    Genitourinary:  Positive for flank pain.   Skin: Negative.    Allergic/Immunologic: Negative.    Neurological: Negative.    Hematological: Negative.    Psychiatric/Behavioral: Negative.         Physical Exam  Physical Exam  Constitutional:       Appearance: She is well-developed.      Comments: Patient appears to be in pain   HENT:      Head: Normocephalic and atraumatic.   Eyes:      Conjunctiva/sclera: Conjunctivae normal.      Pupils: Pupils are equal, round, and reactive to light.   Cardiovascular:      Rate and Rhythm: Normal rate.   Pulmonary:      Effort: Pulmonary effort is normal.   Abdominal:      Palpations: Abdomen is soft.   Musculoskeletal:         General: Normal range of motion.      Cervical back: Normal range of motion and neck supple.   Skin:     General: Skin is warm and dry.   Neurological:      Mental Status: She is alert and oriented to person, place, and time.         Vital Signs  ED Triage Vitals   Temperature Pulse Respirations Blood Pressure SpO2   03/10/24 1404 03/10/24 1404 03/10/24 1404 03/10/24 1404 03/10/24 1404   98.2 °F (36.8 °C) 71 18 117/69 95 %      Temp src Heart Rate Source Patient Position - Orthostatic VS BP Location FiO2 (%)   -- 03/10/24 1404 03/10/24 1430 03/10/24 1430 --    Monitor Lying Right arm       Pain Score       03/10/24 1404       9           Vitals:    03/10/24 1500 03/10/24 1515 03/10/24 1530 03/10/24 1634   BP: 96/52 100/57 102/52 99/53   Pulse: 63 65 63 63   Patient Position - Orthostatic VS: Lying Lying Lying          Visual Acuity      ED Medications  Medications   amitriptyline (ELAVIL) tablet 20 mg (has no administration in time range)   escitalopram (LEXAPRO) tablet 20 mg (has no administration in time range)   gabapentin (NEURONTIN) capsule 300 mg (300 mg Oral Given 3/10/24 1735)   sodium chloride 0.9 % infusion (125 mL/hr Intravenous New Bag 3/10/24 1735)   ondansetron (ZOFRAN) injection 4 mg  (has no administration in time range)   acetaminophen (TYLENOL) tablet 650 mg (has no administration in time range)   enoxaparin (LOVENOX) subcutaneous injection 40 mg (has no administration in time range)   potassium chloride 20 mEq IVPB (premix) (20 mEq Intravenous New Bag 3/10/24 1739)   fentaNYL injection 50 mcg (has no administration in time range)   sodium chloride 0.9 % bolus 1,000 mL (1,000 mL Intravenous New Bag 3/10/24 1426)   ondansetron (ZOFRAN) injection 4 mg (4 mg Intravenous Given 3/10/24 1426)   morphine injection 4 mg (4 mg Intravenous Given 3/10/24 1427)   fentaNYL injection 25 mcg (25 mcg Intravenous Given 3/10/24 1547)       Diagnostic Studies  Results Reviewed       Procedure Component Value Units Date/Time    Triglycerides [[id number]]  (Normal) Collected: 03/10/24 1434    Lab Status: Final result Specimen: Blood from Central Venous Line Updated: 03/10/24 1629     Triglycerides 96 mg/dL     TIBC Panel (incl. Iron, TIBC, % Iron Saturation) [[id number]] Collected: 03/10/24 1434    Lab Status: In process Specimen: Blood from Central Venous Line Updated: 03/10/24 1622    Ferritin [[id number]] Collected: 03/10/24 1434    Lab Status: In process Specimen: Blood from Central Venous Line Updated: 03/10/24 1622    Vitamin B12 [[id number]] Collected: 03/10/24 1434    Lab Status: In process Specimen: Blood from Central Venous Line Updated: 03/10/24 1622    Lipase [[id number]]  (Abnormal) Collected: 03/10/24 1434    Lab Status: Final result Specimen: Blood from Central Venous Line Updated: 03/10/24 1454     Lipase 510 u/L     Comprehensive metabolic panel [[id number]]  (Abnormal) Collected: 03/10/24 1434    Lab Status: Final result Specimen: Blood from Central Venous Line Updated: 03/10/24 1454     Sodium 134 mmol/L      Potassium 3.7 mmol/L      Chloride 110 mmol/L      CO2 19 mmol/L      ANION GAP 5 mmol/L      BUN 15 mg/dL      Creatinine 0.90 mg/dL      Glucose 96 mg/dL      Calcium 8.3 mg/dL      AST 15 U/L       ALT 11 U/L      Alkaline Phosphatase 37 U/L      Total Protein 6.9 g/dL      Albumin 3.9 g/dL      Total Bilirubin 0.30 mg/dL      eGFR 80 ml/min/1.73sq m     Narrative:      National Kid[employer] Foundation guidelines for Chronic Kidney Disease (CKD):     Stage 1 with normal or high GFR (GFR > 90 mL/min/1.73 square meters)    Stage 2 Mild CKD (GFR = 60-89 mL/min/1.73 square meters)    Stage 3A Moderate CKD (GFR = 45-59 mL/min/1.73 square meters)    Stage 3B Moderate CKD (GFR = 30-44 mL/min/1.73 square meters)    Stage 4 Severe CKD (GFR = 15-29 mL/min/1.73 square meters)    Stage 5 End Stage CKD (GFR <15 mL/min/1.73 square meters)  Note: GFR calculation is accurate only with a steady state creatinine    Urine Microscopic [[id number]]  (Normal) Collected: 03/10/24 1440    Lab Status: Final result Specimen: Urine, Clean Catch Updated: 03/10/24 1448     RBC, UA None Seen /hpf      WBC, UA None Seen /hpf      Epithelial Cells Occasional /hpf      Bacteria, UA None Seen /hpf     UA w Reflex to Microscopic w Reflex to Culture [[id number]]  (Abnormal) Collected: 03/10/24 1440    Lab Status: Final result Specimen: Urine, Clean Catch Updated: 03/10/24 1446     Color, UA Yellow     Clarity, UA Slightly Cloudy     Specific Gravity, UA >=1.030     pH, UA 6.0     Leukocytes, UA Negative     Nitrite, UA Negative     Protein, UA 30 (1+) mg/dl      Glucose, UA Negative mg/dl      Ketones, UA Trace mg/dl      Urobilinogen, UA 0.2 E.U./dl      Bilirubin, UA Small     Occult Blood, UA Negative    CBC and differential [[id number]]  (Abnormal) Collected: 03/10/24 1434    Lab Status: Final result Specimen: Blood from Central Venous Line Updated: 03/10/24 1441     WBC 7.15 Thousand/uL      RBC 4.25 Million/uL      Hemoglobin 7.5 g/dL      Hematocrit 27.4 %      MCV 65 fL      MCH 17.6 pg      MCHC 27.4 g/dL      RDW 19.4 %      MPV 9.6 fL      Platelets 306 Thousands/uL      nRBC 0 /100 WBCs      Neutrophils Relative 61 %      Immat  GRANS % 0 %      Lymphocytes Relative 27 %      Monocytes Relative 9 %      Eosinophils Relative 2 %      Basophils Relative 1 %      Neutrophils Absolute 4.36 Thousands/µL      Immature Grans Absolute 0.02 Thousand/uL      Lymphocytes Absolute 1.91 Thousands/µL      Monocytes Absolute 0.65 Thousand/µL      Eosinophils Absolute 0.14 Thousand/µL      Basophils Absolute 0.07 Thousands/µL                    CT renal stone study abdomen pelvis without contrast   Final Result by Sophie Richmond MD (03/10 1533)      Bilateral nonobstructing renal calculi.            Workstation performed: [id number]                    Procedures  Procedures         ED Course  ED Course as of 03/10/24 1745   Sun Mar 10, 2024   1743 UA w Reflex to Microscopic w Reflex to Culture(!)   1743 Urine Microscopic   1743 Triglycerides   1743 Lipase(!)   1744 Comprehensive metabolic panel(!)   1744 CBC and differential(!)   1744 CT renal stone study abdomen pelvis without contrast                               SBIRT 22yo+      Flowsheet Row Most Recent Value   Initial Alcohol Screen: US AUDIT-C     1. How often do you have a drink containing alcohol? 0 Filed at: 03/10/2024 1440   2. How many drinks containing alcohol do you have on a typical day you are drinking?  0 Filed at: 03/10/2024 1440   3b. FEMALE Any Age, or MALE 65+: How often do you have 4 or more drinks on one occassion? 0 Filed at: 03/10/2024 1440   Audit-C Score 0 Filed at: 03/10/2024 1440   SLIME: How many times in the past year have you...    Used an illegal drug or used a prescription medication for non-medical reasons? Never Filed at: 03/10/2024 1440                      Medical Decision Making  Abdominal exam without peritoneal signs.  No evidence of acute abdomen at this time.  Well-appearing.  Given work-up, low suspicion for acute hepatobiliary disease (including acute cholecystitis or cholangitis), PUD (including gastric perforation), acute infectious processes (pneumonia,  hepatitis, pyelonephritis), acute appendicitis, vascular catastrophe, bowel obstruction, viscus perforation, ovarian/testicular torsion, or diverticulitis.  Evaluation consistent with acute pancreatitis, admission advised, patient in agreement, hospitalist service accepts for admission    Problems Addressed:  Pancreatitis: acute illness or injury    Amount and/or Complexity of Data Reviewed  Labs: ordered. Decision-making details documented in ED Course.  Radiology: ordered. Decision-making details documented in ED Course.    Risk  Prescription drug management.  Decision regarding hospitalization.             Disposition  Final diagnoses:   Pancreatitis     Time reflects when diagnosis was documented in both MDM as applicable and the Disposition within this note       Time User Action Codes Description Comment    3/10/2024  3:49 PM Linnette Jaquez Add [K85.90] Pancreatitis           ED Disposition       ED Disposition   Admit    Condition   Stable    Date/Time   Sun Mar 10, 2024 8366    Comment   Case was discussed with Dr Argueta and the patient's admission status was agreed to be Admission Status: inpatient status to the service of Dr. Argueta .               Follow-up Information    None         Current Discharge Medication List        CONTINUE these medications which have NOT CHANGED    Details   acetaminophen (TYLENOL) 325 mg tablet       amitriptyline (ELAVIL) 10 mg tablet Take 2 tablets (20 mg total) by mouth daily at bedtime  Qty: 60 tablet, Refills: 0    Associated Diagnoses: Migraine      amoxicillin-clavulanate (AUGMENTIN) 875-125 mg per tablet Take 1 tablet by mouth every 12 (twelve) hours for 7 days  Qty: 14 tablet, Refills: 0    Associated Diagnoses: Acute non-recurrent frontal sinusitis      cyanocobalamin 1,000 mcg/mL Inject 1 mL (1,000 mcg total) into a muscle every 30 (thirty) days Next dose due 5/1/2023    Associated Diagnoses: B12 deficiency      dicyclomine (BENTYL) 10 mg capsule Take 10 mg by mouth  "if needed      ergocalciferol (ERGOCALCIFEROL) 1.25 MG (55578 UT) capsule Take 1 capsule (50,000 Units total) by mouth 2 (two) times a week Mondays and Thursdays  Qty: 24 capsule, Refills: 1    Associated Diagnoses: Vitamin D insufficiency      escitalopram (LEXAPRO) 20 mg tablet 20 mg daily at bedtime      gabapentin (Neurontin) 300 mg capsule Take 1 capsule (300 mg total) by mouth 3 (three) times a day  Qty: 30 capsule, Refills: 2    Associated Diagnoses: Iron deficiency anemia due to dietary causes      methocarbamol (ROBAXIN) 500 mg tablet Take 1 tablet (500 mg total) by mouth 4 (four) times a day  Qty: 30 tablet, Refills: 2    Associated Diagnoses: Iron deficiency anemia due to dietary causes      ondansetron (ZOFRAN-ODT) 4 mg disintegrating tablet       oxyCODONE-acetaminophen (PERCOCET) 5-325 mg per tablet Take 1 tablet by mouth every 4 (four) hours as needed for moderate pain for up to 10 doses Max Daily Amount: 6 tablets  Qty: 10 tablet, Refills: 0    Associated Diagnoses: Right ureteral stone      potassium chloride 0.4 mEq/mL Inject 200 mL (80 mEq total) into a catheter in a vein over 8 hours at 25 mL/hr daily 80 meq in 1 litre bag to be infused daily over 8 hours   Continue as ordered prior to admission  Qty: 2800 mL, Refills: 0    Associated Diagnoses: Hypokalemia      Syringe/Needle, Disp, (SYRINGE 3CC/25GX5/8\") 25G X 5/8\" 3 ML MISC Use once monthly for B12 injection.  Qty: 1 each, Refills: 11    Associated Diagnoses: B12 deficiency      tamsulosin (FLOMAX) 0.4 mg Take 1 capsule (0.4 mg total) by mouth daily with dinner for 7 days  Qty: 7 capsule, Refills: 0    Associated Diagnoses: Right ureteral stone      vitamin A 3 MG (62524 UT) capsule Take 1 capsule by mouth 2 (two) times a day      vitamin k 100 MCG tablet Take 1 tablet by mouth daily             No discharge procedures on file.    PDMP Review         Value Time User    PDMP Reviewed  Yes 8/11/2023  2:08 PM Garth Roberts MD            ED " Provider  Electronically Signed by             Linnette Jaquez DO  03/10/24 5072

## 2024-03-10 NOTE — ASSESSMENT & PLAN NOTE
And has chronic hypokalemia and takes 80 mEq of IV KCl daily infusion.  Will place on 40 mEq now and recheck BMP in a.m. and give further potassium again tomorrow.  Check magnesium level also

## 2024-03-10 NOTE — ASSESSMENT & PLAN NOTE
baseline hemoglobin is around 9-11.  hb down to 7.5 will check iron and B12 levels and recheck CBC in a.m. transfuse if hemoglobin is less than 7

## 2024-03-11 ENCOUNTER — TELEPHONE (OUTPATIENT)
Dept: NEUROLOGY | Facility: CLINIC | Age: 39
End: 2024-03-11

## 2024-03-11 PROBLEM — E61.2 MAGNESIUM DEFICIENCY: Status: ACTIVE | Noted: 2024-03-11

## 2024-03-11 LAB
ABO GROUP BLD: NORMAL
ABO GROUP BLD: NORMAL
ALBUMIN SERPL BCP-MCNC: 3.3 G/DL (ref 3.5–5)
ALP SERPL-CCNC: 34 U/L (ref 34–104)
ALT SERPL W P-5'-P-CCNC: 9 U/L (ref 7–52)
ANION GAP SERPL CALCULATED.3IONS-SCNC: 4 MMOL/L
AST SERPL W P-5'-P-CCNC: 12 U/L (ref 13–39)
BILIRUB SERPL-MCNC: 0.29 MG/DL (ref 0.2–1)
BLD GP AB SCN SERPL QL: NEGATIVE
BUN SERPL-MCNC: 11 MG/DL (ref 5–25)
CALCIUM ALBUM COR SERPL-MCNC: 8.1 MG/DL (ref 8.3–10.1)
CALCIUM SERPL-MCNC: 7.5 MG/DL (ref 8.4–10.2)
CHLORIDE SERPL-SCNC: 117 MMOL/L (ref 96–108)
CO2 SERPL-SCNC: 19 MMOL/L (ref 21–32)
CREAT SERPL-MCNC: 0.86 MG/DL (ref 0.6–1.3)
ERYTHROCYTE [DISTWIDTH] IN BLOOD BY AUTOMATED COUNT: 19.4 % (ref 11.6–15.1)
GFR SERPL CREATININE-BSD FRML MDRD: 84 ML/MIN/1.73SQ M
GLUCOSE SERPL-MCNC: 89 MG/DL (ref 65–140)
HCT VFR BLD AUTO: 24 % (ref 34.8–46.1)
HCT VFR BLD AUTO: 25 % (ref 34.8–46.1)
HCT VFR BLD AUTO: 28.2 % (ref 34.8–46.1)
HGB BLD-MCNC: 6.7 G/DL (ref 11.5–15.4)
HGB BLD-MCNC: 6.8 G/DL (ref 11.5–15.4)
HGB BLD-MCNC: 8 G/DL (ref 11.5–15.4)
LIPASE SERPL-CCNC: 55 U/L (ref 11–82)
MAGNESIUM SERPL-MCNC: 1.7 MG/DL (ref 1.9–2.7)
MCH RBC QN AUTO: 18.2 PG (ref 26.8–34.3)
MCHC RBC AUTO-ENTMCNC: 27.9 G/DL (ref 31.4–37.4)
MCV RBC AUTO: 65 FL (ref 82–98)
PLATELET # BLD AUTO: 238 THOUSANDS/UL (ref 149–390)
PMV BLD AUTO: 9.4 FL (ref 8.9–12.7)
POTASSIUM SERPL-SCNC: 3.7 MMOL/L (ref 3.5–5.3)
PROT SERPL-MCNC: 5.6 G/DL (ref 6.4–8.4)
RBC # BLD AUTO: 3.68 MILLION/UL (ref 3.81–5.12)
RH BLD: POSITIVE
RH BLD: POSITIVE
SODIUM SERPL-SCNC: 140 MMOL/L (ref 135–147)
SPECIMEN EXPIRATION DATE: NORMAL
TSH SERPL DL<=0.05 MIU/L-ACNC: 1.93 UIU/ML (ref 0.45–4.5)
WBC # BLD AUTO: 5.38 THOUSAND/UL (ref 4.31–10.16)

## 2024-03-11 PROCEDURE — 84443 ASSAY THYROID STIM HORMONE: CPT | Performed by: FAMILY MEDICINE

## 2024-03-11 PROCEDURE — P9016 RBC LEUKOCYTES REDUCED: HCPCS

## 2024-03-11 PROCEDURE — 85027 COMPLETE CBC AUTOMATED: CPT | Performed by: FAMILY MEDICINE

## 2024-03-11 PROCEDURE — 83735 ASSAY OF MAGNESIUM: CPT | Performed by: FAMILY MEDICINE

## 2024-03-11 PROCEDURE — 86901 BLOOD TYPING SEROLOGIC RH(D): CPT | Performed by: PHYSICIAN ASSISTANT

## 2024-03-11 PROCEDURE — 99232 SBSQ HOSP IP/OBS MODERATE 35: CPT | Performed by: PHYSICIAN ASSISTANT

## 2024-03-11 PROCEDURE — 86900 BLOOD TYPING SEROLOGIC ABO: CPT | Performed by: PHYSICIAN ASSISTANT

## 2024-03-11 PROCEDURE — 83690 ASSAY OF LIPASE: CPT | Performed by: FAMILY MEDICINE

## 2024-03-11 PROCEDURE — 86850 RBC ANTIBODY SCREEN: CPT | Performed by: PHYSICIAN ASSISTANT

## 2024-03-11 PROCEDURE — 80053 COMPREHEN METABOLIC PANEL: CPT | Performed by: FAMILY MEDICINE

## 2024-03-11 PROCEDURE — 85014 HEMATOCRIT: CPT | Performed by: PHYSICIAN ASSISTANT

## 2024-03-11 PROCEDURE — 86920 COMPATIBILITY TEST SPIN: CPT

## 2024-03-11 PROCEDURE — 30233N1 TRANSFUSION OF NONAUTOLOGOUS RED BLOOD CELLS INTO PERIPHERAL VEIN, PERCUTANEOUS APPROACH: ICD-10-PCS | Performed by: FAMILY MEDICINE

## 2024-03-11 PROCEDURE — 85018 HEMOGLOBIN: CPT | Performed by: PHYSICIAN ASSISTANT

## 2024-03-11 RX ORDER — POTASSIUM CHLORIDE 29.8 MG/ML
40 INJECTION INTRAVENOUS
Status: DISCONTINUED | OUTPATIENT
Start: 2024-03-11 | End: 2024-03-11

## 2024-03-11 RX ORDER — POTASSIUM CHLORIDE 14.9 MG/ML
20 INJECTION INTRAVENOUS
Status: DISCONTINUED | OUTPATIENT
Start: 2024-03-12 | End: 2024-03-13 | Stop reason: HOSPADM

## 2024-03-11 RX ORDER — POTASSIUM CHLORIDE 14.9 MG/ML
20 INJECTION INTRAVENOUS DAILY
Status: DISCONTINUED | OUTPATIENT
Start: 2024-03-12 | End: 2024-03-11

## 2024-03-11 RX ORDER — MAGNESIUM SULFATE HEPTAHYDRATE 40 MG/ML
2 INJECTION, SOLUTION INTRAVENOUS ONCE
Status: COMPLETED | OUTPATIENT
Start: 2024-03-11 | End: 2024-03-11

## 2024-03-11 RX ORDER — POTASSIUM CHLORIDE 14.9 MG/ML
20 INJECTION INTRAVENOUS
Status: DISCONTINUED | OUTPATIENT
Start: 2024-03-11 | End: 2024-03-11

## 2024-03-11 RX ORDER — POTASSIUM CHLORIDE 29.8 MG/ML
40 INJECTION INTRAVENOUS DAILY
Status: DISCONTINUED | OUTPATIENT
Start: 2024-03-12 | End: 2024-03-11

## 2024-03-11 RX ORDER — CYANOCOBALAMIN 1000 UG/ML
1000 INJECTION, SOLUTION INTRAMUSCULAR; SUBCUTANEOUS
Status: DISCONTINUED | OUTPATIENT
Start: 2024-03-11 | End: 2024-03-13 | Stop reason: HOSPADM

## 2024-03-11 RX ORDER — POTASSIUM CHLORIDE 14.9 MG/ML
20 INJECTION INTRAVENOUS DAILY
Status: DISCONTINUED | OUTPATIENT
Start: 2024-03-19 | End: 2024-03-11

## 2024-03-11 RX ADMIN — POTASSIUM CHLORIDE 20 MEQ: 14.9 INJECTION, SOLUTION INTRAVENOUS at 09:52

## 2024-03-11 RX ADMIN — GABAPENTIN 300 MG: 300 CAPSULE ORAL at 17:03

## 2024-03-11 RX ADMIN — CYANOCOBALAMIN 1000 MCG: 1000 INJECTION, SOLUTION INTRAMUSCULAR; SUBCUTANEOUS at 09:41

## 2024-03-11 RX ADMIN — POTASSIUM CHLORIDE 20 MEQ: 14.9 INJECTION, SOLUTION INTRAVENOUS at 12:49

## 2024-03-11 RX ADMIN — POTASSIUM CHLORIDE 20 MEQ: 14.9 INJECTION, SOLUTION INTRAVENOUS at 07:31

## 2024-03-11 RX ADMIN — FENTANYL CITRATE 50 MCG: 50 INJECTION INTRAMUSCULAR; INTRAVENOUS at 14:01

## 2024-03-11 RX ADMIN — GABAPENTIN 300 MG: 300 CAPSULE ORAL at 08:27

## 2024-03-11 RX ADMIN — SODIUM CHLORIDE 125 ML/HR: 0.9 INJECTION, SOLUTION INTRAVENOUS at 01:10

## 2024-03-11 RX ADMIN — ESCITALOPRAM OXALATE 20 MG: 10 TABLET ORAL at 21:01

## 2024-03-11 RX ADMIN — FENTANYL CITRATE 50 MCG: 50 INJECTION INTRAMUSCULAR; INTRAVENOUS at 18:20

## 2024-03-11 RX ADMIN — AMITRIPTYLINE HYDROCHLORIDE 20 MG: 10 TABLET, FILM COATED ORAL at 21:01

## 2024-03-11 RX ADMIN — FENTANYL CITRATE 50 MCG: 50 INJECTION INTRAMUSCULAR; INTRAVENOUS at 04:59

## 2024-03-11 RX ADMIN — MAGNESIUM SULFATE HEPTAHYDRATE 2 G: 40 INJECTION, SOLUTION INTRAVENOUS at 09:36

## 2024-03-11 RX ADMIN — FENTANYL CITRATE 50 MCG: 50 INJECTION INTRAMUSCULAR; INTRAVENOUS at 09:47

## 2024-03-11 RX ADMIN — IRON SUCROSE 200 MG: 20 INJECTION, SOLUTION INTRAVENOUS at 17:22

## 2024-03-11 RX ADMIN — GABAPENTIN 300 MG: 300 CAPSULE ORAL at 21:01

## 2024-03-11 RX ADMIN — FENTANYL CITRATE 50 MCG: 50 INJECTION INTRAMUSCULAR; INTRAVENOUS at 22:18

## 2024-03-11 NOTE — ASSESSMENT & PLAN NOTE
Recent Labs     03/10/24  1434 03/11/24  0508   HGB 7.5* 6.7*     Baseline Hgb 9-1, no acute source of bleeding   Replete Vit B12  Iron panel consistent with iron deficiency anemia; add Venofer   Request occult stool sample  Order 1 u PRBC   Transfuse Hgb <7  Recommend ambulatory referral to hematology at discharge

## 2024-03-11 NOTE — ASSESSMENT & PLAN NOTE
Presented w/ c/o left-sided flank pain & epigastric pain  POA, lipase 510 -> 55  CT stone study  (03/10): Bilateral nonobstructing renal calcul   Recent tx for sinus infxn, s/p course of Augmentin and now currently on antifungal agents   Etiology of suspected medication induced pancreatitis  TG WNL   Tolerating regular diet; patient having 7/10 abdominal pain & nausea without emesis

## 2024-03-11 NOTE — ASSESSMENT & PLAN NOTE
Presented w/ c/o left-sided flank pain & epigastric pain  POA, lipase 510 -> 55 - > 76  CT stone study  (03/10): Bilateral nonobstructing renal calcul   Recent tx for sinus infxn, s/p course of Augmentin and now currently on antifungal agents   Etiology of suspected medication induced pancreatitis?   TG WNL   As of 03/12:   Tolerating regular diet BUT patient having 8/10 abdominal pain & nausea without emesis  Reduce diet, add Bentyl, curbside consult with GI, will place a formal consult

## 2024-03-11 NOTE — UTILIZATION REVIEW
NOTIFICATION OF INPATIENT ADMISSION   AUTHORIZATION REQUEST   SERVICING FACILITY:   [place] [address]  Tax ID: [id number]  NPI: [id number] ATTENDING PROVIDER:  Attending Name and NPI#: Pallavi Argueta Md [[id number]]  Address: [address] [place] [address]  Phone: [phone]   ADMISSION INFORMATION:  Place of Service: Inpatient [facility]  Place of Service Code: 21  Inpatient Admission Date/Time: 3/10/24  3:49 PM  Discharge Date/Time: No discharge date for patient encounter.  Admitting Diagnosis Code/Description:  Pancreatitis [K85.90]  Flank pain [R10.9]     UTILIZATION REVIEW CONTACT:  Nancy Cummings, Utilization   Network Utilization Review Department  Phone: [phone]  Fax [phone]  Email: [email or web address]  Contact for approvals/pending authorizations, clinical reviews, and discharge.     PHYSICIAN ADVISORY SERVICES:  Medical Necessity Denial & Hfmr-[id number]-[id number] Review  Phone: [phone]  Fax: [phone]  Email: [email or web address]     DISCHARGE SUPPORT TEAM:  For Patients Discharge Needs & Updates  Phone: [phone] opt. 2 Fax: [phone]  Email: [email or web address]

## 2024-03-11 NOTE — ASSESSMENT & PLAN NOTE
PTA B 12 injection 1000 mcg q 30 days; last dose 03/01/24   B12 101 (L)   Provide B12 injection now, continue q 30 days

## 2024-03-11 NOTE — ASSESSMENT & PLAN NOTE
H/o chronic hypokalemia   PTA: 80 mEq of IV KCl q day infusion via port   Known to outpt nephrology   Schedule Kdur IV 40 mEq q day for now     Recent Labs     03/10/24  1434 03/11/24  0508   K 3.7 3.7

## 2024-03-11 NOTE — ASSESSMENT & PLAN NOTE
PTA B 12 injection 1000 mcg q 30 days; last dose 03/01/24   B12 101 (L)   S/p B12 injection on 03/10, continue q 30 days

## 2024-03-11 NOTE — UTILIZATION REVIEW
Initial Clinical Review    Admission: Date/Time/Statement:   Admission Orders (From admission, onward)       Ordered        03/10/24 1549  INPATIENT ADMISSION  Once                          Orders Placed This Encounter   Procedures    INPATIENT ADMISSION     Standing Status:   Standing     Number of Occurrences:   1     Order Specific Question:   Level of Care     Answer:   Med Surg [16]     Order Specific Question:   Estimated length of stay     Answer:   More than 2 Midnights     Order Specific Question:   Certification     Answer:   I certify that inpatient services are medically necessary for this patient for a duration of greater than two midnights. See H&P and MD Progress Notes for additional information about the patient's course of treatment.     ED Arrival Information       Expected   -    Arrival   3/10/2024 13:58    Acuity   Urgent              Means of arrival   Walk-In    Escorted by   Self    Service   Hospitalist    Admission type   Emergency              Arrival complaint   Lower back and abdominal pain             Chief Complaint   Patient presents with    Flank Pain     Pt arrives reporting left flank pain increasing over past 3 days.  Pt reports hx of kidney stones and reports this feels similar.  Pt reports nausea and diarrhea at present.        Initial Presentation: 39 y.o. female , presented to the ED @ [facility], from home, walked in.   Admitted as Inpatient.  Diagnosis: Idiopathic Acute Pancreatitis without Infection or Necrosis.    PMH:  Kidney stone  Date: 03/10/2024   Reports 2 days ago started experiencing some left-sided flank pain she initially thought that she had a kidney stone and then she started having more epigastric pain along with decreased oral intake nausea vomiting and came to the ED to be evaluated.   Lipase 510.  And recently had a sinus infection and took a course of Augmentin and now currently on antifungal agents probably medication induced  pancreatitis.  Will place on full liquid diet toast and crackers IV fluid hydration check triglyceride level and repeat labs in a.m. and monitor for now.  Cont pain control.      Day 2: 03/11/2024   Reports abd pain 7/10. + nausea.  CT stone study  (03/10): Bilateral nonobstructing renal calcul.   Hgl 6.8 > Transfuse 1 unit PRBCs.  Continue IV KCl daily.  Replete Mg PRN.  Advance diet as tolerated.      ED Triage Vitals   Temperature Pulse Respirations Blood Pressure SpO2   03/10/24 1404 03/10/24 1404 03/10/24 1404 03/10/24 1404 03/10/24 1404   98.2 °F (36.8 °C) 71 18 117/69 95 %      Temp Source Heart Rate Source Patient Position - Orthostatic VS BP Location FiO2 (%)   03/11/24 0714 03/10/24 1404 03/10/24 1430 03/10/24 1430 --   Temporal Monitor Lying Right arm       Pain Score       03/10/24 1404       9          Wt Readings from Last 1 Encounters:   03/11/24 52.8 kg (116 lb 6.4 oz)     Additional Vital Signs:   ate/Time Temp Pulse Resp BP MAP (mmHg) SpO2 O2 Device Patient Position - Orthostatic VS   03/11/24 0830 -- -- -- -- -- 99 % -- --   03/11/24 07:14:57 97.7 °F (36.5 °C) 61 18 116/65 82 100 % -- --   03/10/24 22:39:48 98.1 °F (36.7 °C) -- -- 111/75 87 -- -- --   03/10/24 2207 -- -- -- -- -- 97 % None (Room air) --   03/10/24 2145 -- -- -- -- -- 98 % None (Room air) --   03/10/24 1918 -- -- -- -- -- 99 % None (Room air) --   03/10/24 16:34:04 97.7 °F (36.5 °C) 63 16 99/53 68 100 % -- --   03/10/24 1530 -- 63 18 102/52 74 98 % None (Room air) Lying   03/10/24 1515 -- 65 18 100/57 75 99 % None (Room air) Lying   03/10/24 1500 -- 63 18 96/52 71 98 % None (Room air) Lying   03/10/24 1445 -- 66 18 102/56 72 99 % None (Room air) Lying   03/10/24 1430 -- 72 18 96/51 70 97 % None (Room air) Lying       Pertinent Labs/Diagnostic Test Results:   CT renal stone study abdomen pelvis without contrast   Final Result by Sophie Richmond MD (03/10 1533)   Bilateral nonobstructing renal calculi.        Results from last 7  days   Lab Units 03/11/24  0842 03/11/24  0508 03/10/24  1434   WBC Thousand/uL  --  5.38 7.15   HEMOGLOBIN g/dL 6.8* 6.7* 7.5*   HEMATOCRIT % 25.0* 24.0* 27.4*   PLATELETS Thousands/uL  --  238 306   NEUTROS ABS Thousands/µL  --   --  4.36     Results from last 7 days   Lab Units 03/11/24  0508 03/10/24  1434   SODIUM mmol/L 140 134*   POTASSIUM mmol/L 3.7 3.7   CHLORIDE mmol/L 117* 110*   CO2 mmol/L 19* 19*   ANION GAP mmol/L 4 5   BUN mg/dL 11 15   CREATININE mg/dL 0.86 0.90   EGFR ml/min/1.73sq m 84 80   CALCIUM mg/dL 7.5* 8.3*   MAGNESIUM mg/dL 1.7*  --      Results from last 7 days   Lab Units 03/11/24  0508 03/10/24  1434   AST U/L 12* 15   ALT U/L 9 11   ALK PHOS U/L 34 37   TOTAL PROTEIN g/dL 5.6* 6.9   ALBUMIN g/dL 3.3* 3.9   TOTAL BILIRUBIN mg/dL 0.29 0.30     Results from last 7 days   Lab Units 03/11/24  0508 03/10/24  1434   GLUCOSE RANDOM mg/dL 89 96     BETA-HYDROXYBUTYRATE   Date Value Ref Range Status   09/02/2022 0.4 <0.6 mmol/L Final      Results from last 7 days   Lab Units 03/11/24  0508   TSH 3RD GENERATON uIU/mL 1.928     Results from last 7 days   Lab Units 03/10/24  1434   FERRITIN ng/mL 2*   IRON SATURATION % 4*   IRON ug/dL 19*   TIBC ug/dL 448     Results from last 7 days   Lab Units 03/11/24  0508 03/10/24  1434   LIPASE u/L 55 510*       Results from last 7 days   Lab Units 03/10/24  1440   CLARITY UA  Slightly Cloudy   COLOR UA  Yellow   SPEC GRAV UA  >=1.030   PH UA  6.0   GLUCOSE UA mg/dl Negative   KETONES UA mg/dl Trace*   BLOOD UA  Negative   PROTEIN UA mg/dl 30 (1+)*   NITRITE UA  Negative   BILIRUBIN UA  Small*   UROBILINOGEN UA E.U./dl 0.2   LEUKOCYTES UA  Negative   WBC UA /hpf None Seen   RBC UA /hpf None Seen   BACTERIA UA /hpf None Seen   EPITHELIAL CELLS WET PREP /hpf Occasional     ED Treatment:   Medication Administration from 03/10/2024 1357 to 03/10/2024 1628         Date/Time Order Dose Route Action     03/10/2024 1426 EDT sodium chloride 0.9 % bolus 1,000 mL 1,000  mL Intravenous New Bag     03/10/2024 1426 EDT ondansetron (ZOFRAN) injection 4 mg 4 mg Intravenous Given     03/10/2024 1427 EDT morphine injection 4 mg 4 mg Intravenous Given     03/10/2024 1547 EDT fentaNYL injection 25 mcg 25 mcg Intravenous Given          Past Medical History:   Diagnosis Date    C. difficile colitis 2016    COVID-19     1/2022- pt denies long covid    DUB (dysfunctional uterine bleeding)     H. pylori infection     Hypertension     Hypokalemia     Kidney stone November 2023    Left lower quadrant abdominal pain 08/17/2020    Migraine     Pancreatitis     Psychiatric disorder     Anxiety    Rectal bleeding     Renal disorder     Rhabdomyolysis     Sleep apnea     resolved with bariatric surgery    Thrombosed external hemorrhoid      Present on Admission:   Hypokalemia   Idiopathic acute pancreatitis without infection or necrosis   B12 deficiency   Acute on chronic anemia      Admitting Diagnosis: Pancreatitis [K85.90]  Flank pain [R10.9]  Age/Sex: 39 y.o. female  Admission Orders:  Regular diet  Up & OOB as tolerated  Daily weight / I&O  [place] SCDs    Scheduled Medications:  amitriptyline, 20 mg, Oral, HS  cyanocobalamin, 1,000 mcg, Intramuscular, Q30 Days  enoxaparin, 40 mg, Subcutaneous, Daily  escitalopram, 20 mg, Oral, HS  gabapentin, 300 mg, Oral, TID  iron sucrose, 200 mg, Intravenous, Once  magnesium sulfate, 2 g, Intravenous, Once  potassium chloride, 20 mEq, Intravenous, Q2H      Continuous IV Infusions:     PRN Meds:  acetaminophen, 650 mg, Oral, Q6H PRN  fentaNYL, 50 mcg, Intravenous, Q4H PRN  ondansetron, 4 mg, Intravenous, Q6H PRN            Network Utilization Review Department  ATTENTION: Please call with any questions or concerns to [phone] and carefully listen to the prompts so that you are directed to the right person. All voicemails are confidential.   For Discharge needs, contact Care Management DC Support Team at [phone] opt. [phone]  Send all requests for admission  clinical reviews, approved or denied determinations and any other requests to dedicated fax number below belonging to the campus where the patient is receiving treatment. List of dedicated fax numbers for the Facilities:  FACILITY NAME UR FAX NUMBER   ADMISSION DENIALS (Administrative/Medical Necessity) [phone]   DISCHARGE SUPPORT TEAM (NETWORK) [phone]   PARENT CHILD HEALTH (Maternity/NICU/Pediatrics) [phone]   [facility] [phone]   [facility] [phone]   [facility] [phone]   [facility] [phone]   [facility] [phone]   [facility] [phone]   [facility] [phone]   [facility] [phone]   [facility] [phone]   [facility] [phone]   [facility] [phone]   [facility] [phone]

## 2024-03-11 NOTE — PLAN OF CARE
Problem: GASTROINTESTINAL - ADULT  Goal: Minimal or absence of nausea and/or vomiting  Description: INTERVENTIONS:  - Administer IV fluids if ordered to ensure adequate hydration  - Maintain NPO status until nausea and vomiting are resolved  - Nasogastric tube if ordered  - Administer ordered antiemetic medications as needed  - Provide nonpharmacologic comfort measures as appropriate  - Advance diet as tolerated, if ordered  - Consider nutrition services referral to assist patient with adequate nutrition and appropriate food choices  Outcome: Progressing  Goal: Maintains or returns to baseline bowel function  Description: INTERVENTIONS:  - Assess bowel function  - Encourage oral fluids to ensure adequate hydration  - Administer IV fluids if ordered to ensure adequate hydration  - Administer ordered medications as needed  - Encourage mobilization and activity  - Consider nutritional services referral to assist patient with adequate nutrition and appropriate food choices  Outcome: Progressing  Goal: Maintains adequate nutritional intake  Description: INTERVENTIONS:  - Monitor percentage of each meal consumed  - Identify factors contributing to decreased intake, treat as appropriate  - Assist with meals as needed  - Monitor I&O, weight, and lab values if indicated  - Obtain nutrition services referral as needed  Outcome: Progressing     Problem: GENITOURINARY - ADULT  Goal: Maintains or returns to baseline urinary function  Description: INTERVENTIONS:  - Assess urinary function  - Encourage oral fluids to ensure adequate hydration if ordered  - Administer IV fluids as ordered to ensure adequate hydration  - Administer ordered medications as needed  - Offer frequent toileting  - Follow urinary retention protocol if ordered  Outcome: Progressing  Goal: Absence of urinary retention  Description: INTERVENTIONS:  - Assess patient’s ability to void and empty bladder  - Monitor I/O  - Bladder scan as needed  - Discuss with  physician/AP medications to alleviate retention as needed  - Discuss catheterization for long term situations as appropriate  Outcome: Progressing     Problem: PAIN - ADULT  Goal: Verbalizes/displays adequate comfort level or baseline comfort level  Description: Interventions:  - Encourage patient to monitor pain and request assistance  - Assess pain using appropriate pain scale  - Administer analgesics based on type and severity of pain and evaluate response  - Implement non-pharmacological measures as appropriate and evaluate response  - Consider cultural and social influences on pain and pain management  - Notify physician/advanced practitioner if interventions unsuccessful or patient reports new pain  Outcome: Progressing     Problem: SAFETY ADULT  Goal: Patient will remain free of falls  Description: INTERVENTIONS:  - Educate patient/family on patient safety including physical limitations  - Instruct patient to call for assistance with activity   - Consult OT/PT to assist with strengthening/mobility   - Keep Call bell within reach  - Keep bed low and locked with side rails adjusted as appropriate  - Keep care items and personal belongings within reach  - Initiate and maintain comfort rounds  - Make Fall Risk Sign visible to staff  - Apply yellow socks and bracelet for high fall risk patients  - Consider moving patient to room near nurses station  Outcome: Progressing  Goal: Maintain or return to baseline ADL function  Description: INTERVENTIONS:  -  Assess patient's ability to carry out ADLs; assess patient's baseline for ADL function and identify physical deficits which impact ability to perform ADLs (bathing, care of mouth/teeth, toileting, grooming, dressing, etc.)  - Assess/evaluate cause of self-care deficits   - Assess range of motion  - Assess patient's mobility; develop plan if impaired  - Assess patient's need for assistive devices and provide as appropriate  - Encourage maximum independence but  intervene and supervise when necessary  - Involve family in performance of ADLs  - Assess for home care needs following discharge   - Consider OT consult to assist with ADL evaluation and planning for discharge  - Provide patient education as appropriate  Outcome: Progressing  Goal: Maintains/Returns to pre admission functional level  Description: INTERVENTIONS:  - Perform AM-PAC 6 Click Basic Mobility/ Daily Activity assessment daily.  - Set and communicate daily mobility goal to care team and patient/family/caregiver.   - Collaborate with rehabilitation services on mobility goals if consulted  - Stand patient 3 times a day  - Ambulate patient 3 times a day  - Out of bed to chair 3 times a day   - Out of bed for meals 3 times a day  - Out of bed for toileting  - Record patient progress and toleration of activity level   Outcome: Progressing     Problem: DISCHARGE PLANNING  Goal: Discharge to home or other facility with appropriate resources  Description: INTERVENTIONS:  - Identify barriers to discharge w/patient and caregiver  - Arrange for needed discharge resources and transportation as appropriate  - Identify discharge learning needs (meds, wound care, etc.)  - Arrange for interpretive services to assist at discharge as needed  - Refer to Case Management Department for coordinating discharge planning if the patient needs post-hospital services based on physician/advanced practitioner order or complex needs related to functional status, cognitive ability, or social support system  Outcome: Progressing     Problem: Knowledge Deficit  Goal: Patient/family/caregiver demonstrates understanding of disease process, treatment plan, medications, and discharge instructions  Description: Complete learning assessment and assess knowledge base.  Interventions:  - Provide teaching at level of understanding  - Provide teaching via preferred learning methods  Outcome: Progressing

## 2024-03-11 NOTE — PROGRESS NOTES
[facility]  Progress Note  Name: Brittani Patino I  MRN: [id number]  Unit/Bed#: [facility] I Date of Admission: 3/10/2024   Date of Service: 3/11/2024 I Hospital Day: 1    Assessment/Plan   * Idiopathic acute pancreatitis without infection or necrosis  Assessment & Plan  Presented w/ c/o left-sided flank pain & epigastric pain  POA, lipase 510 -> 55  CT stone study  (03/10): Bilateral nonobstructing renal calcul   Recent tx for sinus infxn, s/p course of Augmentin and now currently on antifungal agents   Etiology of suspected medication induced pancreatitis  TG WNL   Tolerating regular diet; patient having 7/10 abdominal pain & nausea without emesis    Acute on chronic anemia  Assessment & Plan  Recent Labs     03/10/24  1434 03/11/24  0508 03/11/24  0842   HGB 7.5* 6.7* 6.8*       Baseline Hgb 9-10, no acute source of bleeding   Replete Vit B12  Iron panel consistent with iron deficiency anemia; add Venofer   Request occult stool sample  Order 1 u PRBC   Transfuse Hgb <7  Recommend ambulatory referral to hematology at discharge  Last colonoscopy 1 year ago     B12 deficiency  Assessment & Plan  PTA B 12 injection 1000 mcg q 30 days; last dose 03/01/24   B12 101 (L)   Provide B12 injection now, continue q 30 days     Hypokalemia  Assessment & Plan  H/o chronic hypokalemia   PTA: 80 mEq of IV KCl q day infusion via port   Known to outpt nephrology   Schedule Kdur IV 40 mEq q day for now     Recent Labs     03/10/24  1434 03/11/24  0508   K 3.7 3.7           Magnesium deficiency  Assessment & Plan  Mag 1.7; in setting chronic hypokalemia, replete Mg            VTE Pharmacologic Prophylaxis: VTE Score: 1 continue Lovenox & SCD     Mobility:   Basic Mobility Inpatient Raw Score: 22  JH-HLM Goal: 7: Walk 25 feet or more  JH-HLM Achieved: 7: Walk 25 feet or more  HLM Goal achieved. Continue to encourage appropriate mobility.    Patient Centered Rounds: I performed bedside rounds with nursing staff  today.   Discussions with Specialists or Other Care Team Provider: Case management    Education and Discussions with Family / Patient: Patient declined call to .     Total Time Spent on Date of Encounter in care of patient: 65 mins. This time was spent on one or more of the following: performing physical exam; counseling and coordination of care; obtaining or reviewing history; documenting in the medical record; reviewing/ordering tests, medications or procedures; communicating with other healthcare professionals and discussing with patient's family/caregivers.    Current Length of Stay: 1 day(s)  Current Patient Status: Inpatient   Certification Statement: The patient will continue to require additional inpatient hospital stay due to resolving pancreatitis and anemia  Discharge Plan: Anticipate discharge tomorrow to home.    Code Status: Level 1 - Full Code    Subjective:   Patient is doing okay.  She admits to 7/10 abdominal pain which is tolerable.  She has nausea without emesis.  She is urinating normally.  She had a bowel movement yesterday.  She denies any acute sources of bleeding including hematochezia, hemoptysis, or melena.    Objective:     Vitals:   Temp (24hrs), Av.1 °F (36.7 °C), Min:97.7 °F (36.5 °C), Max:98.8 °F (37.1 °C)    Temp:  [97.7 °F (36.5 °C)-98.8 °F (37.1 °C)] 98.8 °F (37.1 °C)  HR:  [61-72] 63  Resp:  [16-18] 18  BP: ()/(51-75) 116/65  SpO2:  [95 %-100 %] 99 %  Body mass index is 21.29 kg/m².     Input and Output Summary (last 24 hours):     Intake/Output Summary (Last 24 hours) at 3/11/2024 1252  Last data filed at 3/11/2024 0951  Gross per 24 hour   Intake 2573.33 ml   Output --   Net 2573.33 ml       Physical Exam:   Physical Exam  Constitutional:       Appearance: She is normal weight.   HENT:      Head: Normocephalic.      Right Ear: External ear normal.      Left Ear: External ear normal.      Nose: Nose normal.      Mouth/Throat:      Mouth: Mucous membranes  are moist.      Pharynx: Oropharynx is clear.   Eyes:      Extraocular Movements: Extraocular movements intact.      Conjunctiva/sclera: Conjunctivae normal.   Cardiovascular:      Rate and Rhythm: Normal rate and regular rhythm.      Pulses: Normal pulses.      Heart sounds: Normal heart sounds.      Comments: Port left chest wall  Pulmonary:      Effort: Pulmonary effort is normal. No respiratory distress.      Breath sounds: Normal breath sounds. No wheezing.   Abdominal:      General: Abdomen is flat. Bowel sounds are normal. There is no distension.      Palpations: Abdomen is soft. There is no mass.      Tenderness: There is abdominal tenderness (Left upper quadrant). There is no guarding.   Musculoskeletal:         General: No swelling or deformity. Normal range of motion.      Cervical back: Normal range of motion.   Skin:     General: Skin is warm and dry.      Capillary Refill: Capillary refill takes less than 2 seconds.      Coloration: Skin is not jaundiced.   Neurological:      General: No focal deficit present.      Mental Status: She is alert. Mental status is at baseline.   Psychiatric:         Mood and Affect: Mood normal.         Behavior: Behavior normal.         Additional Data:     Labs:  Results from last 7 days   Lab Units 03/11/24  0842 03/11/24  0508 03/10/24  1434   WBC Thousand/uL  --  5.38 7.15   HEMOGLOBIN g/dL 6.8* 6.7* 7.5*   HEMATOCRIT % 25.0* 24.0* 27.4*   PLATELETS Thousands/uL  --  238 306   NEUTROS PCT %  --   --  61   LYMPHS PCT %  --   --  27   MONOS PCT %  --   --  9   EOS PCT %  --   --  2     Results from last 7 days   Lab Units 03/11/24  0508   SODIUM mmol/L 140   POTASSIUM mmol/L 3.7   CHLORIDE mmol/L 117*   CO2 mmol/L 19*   BUN mg/dL 11   CREATININE mg/dL 0.86   ANION GAP mmol/L 4   CALCIUM mg/dL 7.5*   ALBUMIN g/dL 3.3*   TOTAL BILIRUBIN mg/dL 0.29   ALK PHOS U/L 34   ALT U/L 9   AST U/L 12*   GLUCOSE RANDOM mg/dL 89                       Lines/Drains:  Invasive Devices        Central Venous Catheter Line  Duration             Port A Cath 02/09/24 Left Chest 30 days                    Central Line:  Goal for removal:  chronic port left sided              Imaging: Reviewed radiology reports from this admission including: abdominal/pelvic CT    Recent Cultures (last 7 days):         Last 24 Hours Medication List:   Current Facility-Administered Medications   Medication Dose Route Frequency Provider Last Rate    acetaminophen  650 mg Oral Q6H PRN Pallavi Argueta MD      amitriptyline  20 mg Oral HS Pallavi Argueta MD      cyanocobalamin  1,000 mcg Intramuscular Q30 Days Ingrid Brantley PA-C      enoxaparin  40 mg Subcutaneous Daily Pallavi Argueta MD      escitalopram  20 mg Oral HS Pallavi Argueta MD      fentaNYL  50 mcg Intravenous Q4H PRN Pallavi Argueta MD      gabapentin  300 mg Oral TID Pallavi Argueta MD      iron sucrose  200 mg Intravenous Once Ingrid Brantley PA-C      [START ON 3/12/2024] iron sucrose  200 mg Intravenous Daily Ingrid Brantley PA-C      ondansetron  4 mg Intravenous Q6H PRN Pallavi Argueta MD      potassium chloride  20 mEq Intravenous Q2H Pallavi Argueta MD 20 mEq (03/11/24 1249)    [START ON 3/12/2024] potassium chloride  40 mEq Intravenous Daily Ingrid Brantley PA-C          Today, Patient Was Seen By: Ingrid Brantley PA-C    **Please Note: This note may have been constructed using a voice recognition system.**

## 2024-03-11 NOTE — ASSESSMENT & PLAN NOTE
Recent Labs     03/10/24  1434 03/11/24  0508 03/11/24  0842   HGB 7.5* 6.7* 6.8*       Baseline Hgb 9-10, no acute source of bleeding   Vit B 12 repleted on 03/11  Iron panel consistent with iron deficiency anemia; s/p Venofer x 2 doses   Occult stool sample pending   S/p 1 u PRBC   Transfuse Hgb <7  Recommend ambulatory referral to hematology at discharge  Last colonoscopy 1 year ago

## 2024-03-12 LAB
ABO GROUP BLD BPU: NORMAL
ANION GAP SERPL CALCULATED.3IONS-SCNC: 5 MMOL/L
BPU ID: NORMAL
BUN SERPL-MCNC: 13 MG/DL (ref 5–25)
CALCIUM SERPL-MCNC: 7.6 MG/DL (ref 8.4–10.2)
CHLORIDE SERPL-SCNC: 110 MMOL/L (ref 96–108)
CO2 SERPL-SCNC: 24 MMOL/L (ref 21–32)
CREAT SERPL-MCNC: 0.9 MG/DL (ref 0.6–1.3)
CROSSMATCH: NORMAL
ERYTHROCYTE [DISTWIDTH] IN BLOOD BY AUTOMATED COUNT: 22.4 % (ref 11.6–15.1)
GFR SERPL CREATININE-BSD FRML MDRD: 80 ML/MIN/1.73SQ M
GLUCOSE SERPL-MCNC: 88 MG/DL (ref 65–140)
HCT VFR BLD AUTO: 27.7 % (ref 34.8–46.1)
HEMOCCULT STL QL: NEGATIVE
HGB BLD-MCNC: 8 G/DL (ref 11.5–15.4)
LIPASE SERPL-CCNC: 76 U/L (ref 11–82)
MAGNESIUM SERPL-MCNC: 1.9 MG/DL (ref 1.9–2.7)
MCH RBC QN AUTO: 19.5 PG (ref 26.8–34.3)
MCHC RBC AUTO-ENTMCNC: 28.9 G/DL (ref 31.4–37.4)
MCV RBC AUTO: 68 FL (ref 82–98)
PLATELET # BLD AUTO: 232 THOUSANDS/UL (ref 149–390)
PMV BLD AUTO: 9.9 FL (ref 8.9–12.7)
POTASSIUM SERPL-SCNC: 3.7 MMOL/L (ref 3.5–5.3)
RBC # BLD AUTO: 4.1 MILLION/UL (ref 3.81–5.12)
SODIUM SERPL-SCNC: 139 MMOL/L (ref 135–147)
UNIT DISPENSE STATUS: NORMAL
UNIT PRODUCT CODE: NORMAL
UNIT PRODUCT VOLUME: 350 ML
UNIT RH: NORMAL
WBC # BLD AUTO: 6.12 THOUSAND/UL (ref 4.31–10.16)

## 2024-03-12 PROCEDURE — 83735 ASSAY OF MAGNESIUM: CPT | Performed by: PHYSICIAN ASSISTANT

## 2024-03-12 PROCEDURE — 80048 BASIC METABOLIC PNL TOTAL CA: CPT | Performed by: PHYSICIAN ASSISTANT

## 2024-03-12 PROCEDURE — 82272 OCCULT BLD FECES 1-3 TESTS: CPT | Performed by: PHYSICIAN ASSISTANT

## 2024-03-12 PROCEDURE — 85027 COMPLETE CBC AUTOMATED: CPT | Performed by: PHYSICIAN ASSISTANT

## 2024-03-12 PROCEDURE — 83690 ASSAY OF LIPASE: CPT | Performed by: PHYSICIAN ASSISTANT

## 2024-03-12 PROCEDURE — 99232 SBSQ HOSP IP/OBS MODERATE 35: CPT | Performed by: PHYSICIAN ASSISTANT

## 2024-03-12 RX ORDER — AMOXICILLIN 250 MG
1 CAPSULE ORAL 2 TIMES DAILY
Status: DISCONTINUED | OUTPATIENT
Start: 2024-03-12 | End: 2024-03-13 | Stop reason: HOSPADM

## 2024-03-12 RX ORDER — POTASSIUM CHLORIDE 29.8 MG/ML
40 INJECTION INTRAVENOUS DAILY
Status: DISCONTINUED | OUTPATIENT
Start: 2024-03-13 | End: 2024-03-12

## 2024-03-12 RX ORDER — DICYCLOMINE HYDROCHLORIDE 10 MG/1
10 CAPSULE ORAL
Status: DISCONTINUED | OUTPATIENT
Start: 2024-03-12 | End: 2024-03-13 | Stop reason: HOSPADM

## 2024-03-12 RX ADMIN — FENTANYL CITRATE 50 MCG: 50 INJECTION INTRAMUSCULAR; INTRAVENOUS at 14:58

## 2024-03-12 RX ADMIN — SENNOSIDES AND DOCUSATE SODIUM 1 TABLET: 8.6; 5 TABLET ORAL at 17:03

## 2024-03-12 RX ADMIN — DICYCLOMINE HYDROCHLORIDE 10 MG: 10 CAPSULE ORAL at 10:59

## 2024-03-12 RX ADMIN — FENTANYL CITRATE 50 MCG: 50 INJECTION INTRAMUSCULAR; INTRAVENOUS at 20:19

## 2024-03-12 RX ADMIN — FENTANYL CITRATE 50 MCG: 50 INJECTION INTRAMUSCULAR; INTRAVENOUS at 05:43

## 2024-03-12 RX ADMIN — SENNOSIDES AND DOCUSATE SODIUM 1 TABLET: 8.6; 5 TABLET ORAL at 10:59

## 2024-03-12 RX ADMIN — GABAPENTIN 300 MG: 300 CAPSULE ORAL at 09:27

## 2024-03-12 RX ADMIN — GABAPENTIN 300 MG: 300 CAPSULE ORAL at 20:19

## 2024-03-12 RX ADMIN — FENTANYL CITRATE 50 MCG: 50 INJECTION INTRAMUSCULAR; INTRAVENOUS at 10:10

## 2024-03-12 RX ADMIN — GABAPENTIN 300 MG: 300 CAPSULE ORAL at 17:03

## 2024-03-12 RX ADMIN — DICYCLOMINE HYDROCHLORIDE 10 MG: 10 CAPSULE ORAL at 17:03

## 2024-03-12 RX ADMIN — IRON SUCROSE 200 MG: 20 INJECTION, SOLUTION INTRAVENOUS at 09:27

## 2024-03-12 RX ADMIN — POTASSIUM CHLORIDE 20 MEQ: 14.9 INJECTION, SOLUTION INTRAVENOUS at 13:45

## 2024-03-12 RX ADMIN — DICYCLOMINE HYDROCHLORIDE 10 MG: 10 CAPSULE ORAL at 22:28

## 2024-03-12 RX ADMIN — ESCITALOPRAM OXALATE 20 MG: 10 TABLET ORAL at 22:28

## 2024-03-12 RX ADMIN — POTASSIUM CHLORIDE 20 MEQ: 14.9 INJECTION, SOLUTION INTRAVENOUS at 10:51

## 2024-03-12 RX ADMIN — AMITRIPTYLINE HYDROCHLORIDE 20 MG: 10 TABLET, FILM COATED ORAL at 22:28

## 2024-03-12 NOTE — PROGRESS NOTES
[facility]  Progress Note  Name: Brittani Patino I  MRN: [id number]  Unit/Bed#: [facility] I Date of Admission: 3/10/2024   Date of Service: 3/12/2024 I Hospital Day: 2    Assessment/Plan   * Idiopathic acute pancreatitis without infection or necrosis  Assessment & Plan  Presented w/ c/o left-sided flank pain & epigastric pain  POA, lipase 510 -> 55 - > 76  CT stone study  (03/10): Bilateral nonobstructing renal calcul   Recent tx for sinus infxn, s/p course of Augmentin and now currently on antifungal agents   Etiology of suspected medication induced pancreatitis?   TG WNL   As of 03/12:   Tolerating regular diet BUT patient having 8/10 abdominal pain & nausea without emesis  Reduce diet, add Bentyl, curbside consult with GI, will place a formal consult    Acute on chronic anemia  Assessment & Plan  Recent Labs     03/10/24  1434 03/11/24  0508 03/11/24  0842   HGB 7.5* 6.7* 6.8*       Baseline Hgb 9-10, no acute source of bleeding   Vit B 12 repleted on 03/11  Iron panel consistent with iron deficiency anemia; s/p Venofer x 2 doses   Occult stool sample pending   S/p 1 u PRBC   Transfuse Hgb <7  Recommend ambulatory referral to hematology at discharge  Last colonoscopy 1 year ago     B12 deficiency  Assessment & Plan  PTA B 12 injection 1000 mcg q 30 days; last dose 03/01/24   B12 101 (L)   S/p B12 injection on 03/10, continue q 30 days     Hypokalemia  Assessment & Plan  H/o chronic hypokalemia   PTA: 80 mEq of IV KCl q day infusion via port   Known to outpt nephrology   Schedule Kdur IV 40 mEq q day for now     Recent Labs     03/10/24  1434 03/11/24  0508   K 3.7 3.7         Magnesium deficiency  Assessment & Plan  Resolved, repleted  Mg 1.9           VTE Pharmacologic Prophylaxis: VTE Score: 1 High Risk (Score >/= 5) - Pharmacological DVT Prophylaxis Ordered: enoxaparin (Lovenox). Sequential Compression Devices Ordered.    Mobility:   Basic Mobility Inpatient Raw Score: 24  -[facility] Goal:  8: Walk 250 feet or more  JH-HLM Achieved: 7: Walk 25 feet or more  HLM Goal achieved. Continue to encourage appropriate mobility.    Patient Centered Rounds: I performed bedside rounds with nursing staff today.   Discussions with Specialists or Other Care Team Provider: Case management; neeraj consult with gastroenterology    Education and Discussions with Family / Patient: Patient plans to update her family    Total Time Spent on Date of Encounter in care of patient: 45 mins. This time was spent on one or more of the following: performing physical exam; counseling and coordination of care; obtaining or reviewing history; documenting in the medical record; reviewing/ordering tests, medications or procedures; communicating with other healthcare professionals and discussing with patient's family/caregivers.    Current Length of Stay: 2 day(s)  Current Patient Status: Inpatient   Certification Statement: The patient will continue to require additional inpatient hospital stay due to persistent abdominal pain following acute pancreatitis  Discharge Plan: Anticipate discharge tomorrow to home.    Code Status: Level 1 - Full Code    Subjective:   Patient admits to persistent epigastric pain.  She would rate her pain a 9/10.  Nursing notes she is still requiring/asking for fentanyl ATC.  She admits to tolerating her regular diet however she does have nausea without emesis.  She had a bowel movement yesterday.  She is without dysuria.    Objective:     Vitals:   Temp (24hrs), Av.7 °F (37.1 °C), Min:98.1 °F (36.7 °C), Max:99 °F (37.2 °C)    Temp:  [98.1 °F (36.7 °C)-99 °F (37.2 °C)] 98.6 °F (37 °C)  HR:  [52-73] 65  Resp:  [17-18] 18  BP: (104-115)/(58-69) 107/69  SpO2:  [96 %-100 %] 100 %  Body mass index is 21.77 kg/m².     Input and Output Summary (last 24 hours):     Intake/Output Summary (Last 24 hours) at 3/12/2024 1207  Last data filed at 3/12/2024 0900  Gross per 24 hour   Intake 682.45 ml   Output --   Net  682.45 ml       Physical Exam:   Physical Exam  Constitutional:       Appearance: She is normal weight.   HENT:      Head: Normocephalic.      Right Ear: External ear normal.      Left Ear: External ear normal.      Nose: Nose normal.      Mouth/Throat:      Mouth: Mucous membranes are moist.      Pharynx: Oropharynx is clear. No oropharyngeal exudate.   Eyes:      Extraocular Movements: Extraocular movements intact.      Conjunctiva/sclera: Conjunctivae normal.   Cardiovascular:      Rate and Rhythm: Normal rate and regular rhythm.      Pulses: Normal pulses.      Heart sounds: Normal heart sounds.      Comments: Port left-sided chest wall  Pulmonary:      Effort: Pulmonary effort is normal. No respiratory distress.      Breath sounds: Normal breath sounds. No wheezing or rales.   Abdominal:      General: Abdomen is flat. Bowel sounds are normal. There is no distension.      Palpations: Abdomen is soft.      Tenderness: There is abdominal tenderness (Mild to moderate epigastric tenderness). There is no guarding.   Musculoskeletal:         General: No swelling. Normal range of motion.      Cervical back: Normal range of motion.   Skin:     General: Skin is warm and dry.   Neurological:      General: No focal deficit present.      Mental Status: She is alert. Mental status is at baseline.   Psychiatric:         Mood and Affect: Mood normal.         Behavior: Behavior normal.         Additional Data:     Labs:  Results from last 7 days   Lab Units 03/12/24  0539 03/11/24  0508 03/10/24  1434   WBC Thousand/uL 6.12   < > 7.15   HEMOGLOBIN g/dL 8.0*   < > 7.5*   HEMATOCRIT % 27.7*   < > 27.4*   PLATELETS Thousands/uL 232   < > 306   NEUTROS PCT %  --   --  61   LYMPHS PCT %  --   --  27   MONOS PCT %  --   --  9   EOS PCT %  --   --  2    < > = values in this interval not displayed.     Results from last 7 days   Lab Units 03/12/24  0539 03/11/24  0508   SODIUM mmol/L 139 140   POTASSIUM mmol/L 3.7 3.7   CHLORIDE mmol/L  110* 117*   CO2 mmol/L 24 19*   BUN mg/dL 13 11   CREATININE mg/dL 0.90 0.86   ANION GAP mmol/L 5 4   CALCIUM mg/dL 7.6* 7.5*   ALBUMIN g/dL  --  3.3*   TOTAL BILIRUBIN mg/dL  --  0.29   ALK PHOS U/L  --  34   ALT U/L  --  9   AST U/L  --  12*   GLUCOSE RANDOM mg/dL 88 89                       Lines/Drains:  Invasive Devices       Central Venous Catheter Line  Duration             Port A Cath 02/09/24 Left Chest 31 days                    Central Line:  Goal for removal:  Chronically has a left chest wall catheter             Imaging: Reviewed radiology reports from this admission including: abdominal/pelvic CT    Recent Cultures (last 7 days):         Last 24 Hours Medication List:   Current Facility-Administered Medications   Medication Dose Route Frequency Provider Last Rate    acetaminophen  650 mg Oral Q6H PRN Pallavi Argueta MD      amitriptyline  20 mg Oral HS Pallavi Argueta MD      cyanocobalamin  1,000 mcg Intramuscular Q30 Days Ingrid Brantley PA-C      dicyclomine  10 mg Oral 4x Daily (AC & HS) Ingrid Brantley PA-C      enoxaparin  40 mg Subcutaneous Daily Pallavi Argueta MD      escitalopram  20 mg Oral HS Pallavi Argueta MD      fentaNYL  50 mcg Intravenous Q4H PRN Pallavi Argueta MD      gabapentin  300 mg Oral TID Pallavi Argueta MD      ondansetron  4 mg Intravenous Q6H PRN Pallavi Argueta MD      potassium chloride  20 mEq Intravenous 2 times per day Ingrid Brantley PA-C 20 mEq (03/12/24 1051)    [START ON 3/13/2024] potassium chloride  40 mEq Intravenous Daily Ingrid Brantley PA-C      senna-docusate sodium  1 tablet Oral BID Ingrid Brantley PA-C          Today, Patient Was Seen By: Ingrid Brantley PA-C    **Please Note: This note may have been constructed using a voice recognition system.**

## 2024-03-12 NOTE — PLAN OF CARE
Problem: GASTROINTESTINAL - ADULT  Goal: Minimal or absence of nausea and/or vomiting  Description: INTERVENTIONS:  - Administer IV fluids if ordered to ensure adequate hydration  - Maintain NPO status until nausea and vomiting are resolved  - Nasogastric tube if ordered  - Administer ordered antiemetic medications as needed  - Provide nonpharmacologic comfort measures as appropriate  - Advance diet as tolerated, if ordered  - Consider nutrition services referral to assist patient with adequate nutrition and appropriate food choices  Outcome: Progressing  Goal: Maintains or returns to baseline bowel function  Description: INTERVENTIONS:  - Assess bowel function  - Encourage oral fluids to ensure adequate hydration  - Administer IV fluids if ordered to ensure adequate hydration  - Administer ordered medications as needed  - Encourage mobilization and activity  - Consider nutritional services referral to assist patient with adequate nutrition and appropriate food choices  Outcome: Progressing  Goal: Maintains adequate nutritional intake  Description: INTERVENTIONS:  - Monitor percentage of each meal consumed  - Identify factors contributing to decreased intake, treat as appropriate  - Assist with meals as needed  - Monitor I&O, weight, and lab values if indicated  - Obtain nutrition services referral as needed  Outcome: Progressing     Problem: GENITOURINARY - ADULT  Goal: Maintains or returns to baseline urinary function  Description: INTERVENTIONS:  - Assess urinary function  - Encourage oral fluids to ensure adequate hydration if ordered  - Administer IV fluids as ordered to ensure adequate hydration  - Administer ordered medications as needed  - Offer frequent toileting  - Follow urinary retention protocol if ordered  Outcome: Progressing  Goal: Absence of urinary retention  Description: INTERVENTIONS:  - Assess patient’s ability to void and empty bladder  - Monitor I/O  - Bladder scan as needed  - Discuss with  physician/AP medications to alleviate retention as needed  - Discuss catheterization for long term situations as appropriate  Outcome: Progressing     Problem: PAIN - ADULT  Goal: Verbalizes/displays adequate comfort level or baseline comfort level  Description: Interventions:  - Encourage patient to monitor pain and request assistance  - Assess pain using appropriate pain scale  - Administer analgesics based on type and severity of pain and evaluate response  - Implement non-pharmacological measures as appropriate and evaluate response  - Consider cultural and social influences on pain and pain management  - Notify physician/advanced practitioner if interventions unsuccessful or patient reports new pain  Outcome: Progressing     Problem: SAFETY ADULT  Goal: Patient will remain free of falls  Description: INTERVENTIONS:  - Educate patient/family on patient safety including physical limitations  - Instruct patient to call for assistance with activity   - Consult OT/PT to assist with strengthening/mobility   - Keep Call bell within reach  - Keep bed low and locked with side rails adjusted as appropriate  - Keep care items and personal belongings within reach  - Initiate and maintain comfort rounds  - Make Fall Risk Sign visible to staff  - Offer Toileting every 2 Hours, in advance of need  - Apply yellow socks and bracelet for high fall risk patients  - Consider moving patient to room near nurses station  Outcome: Progressing  Goal: Maintain or return to baseline ADL function  Description: INTERVENTIONS:  -  Assess patient's ability to carry out ADLs; assess patient's baseline for ADL function and identify physical deficits which impact ability to perform ADLs (bathing, care of mouth/teeth, toileting, grooming, dressing, etc.)  - Assess/evaluate cause of self-care deficits   - Assess range of motion  - Assess patient's mobility; develop plan if impaired  - Assess patient's need for assistive devices and provide as  appropriate  - Encourage maximum independence but intervene and supervise when necessary  - Involve family in performance of ADLs  - Assess for home care needs following discharge   - Consider OT consult to assist with ADL evaluation and planning for discharge  - Provide patient education as appropriate  Outcome: Progressing  Goal: Maintains/Returns to pre admission functional level  Description: INTERVENTIONS:  - Perform AM-PAC 6 Click Basic Mobility/ Daily Activity assessment daily.  - Set and communicate daily mobility goal to care team and patient/family/caregiver.   - Collaborate with rehabilitation services on mobility goals if consulted  - Stand patient 3 times a day  - Ambulate patient 3 times a day  - Out of bed to chair 3 times a day   - Out of bed for meals 3 times a day  - Out of bed for toileting  - Record patient progress and toleration of activity level   Outcome: Progressing     Problem: DISCHARGE PLANNING  Goal: Discharge to home or other facility with appropriate resources  Description: INTERVENTIONS:  - Identify barriers to discharge w/patient and caregiver  - Arrange for needed discharge resources and transportation as appropriate  - Identify discharge learning needs (meds, wound care, etc.)  - Arrange for interpretive services to assist at discharge as needed  - Refer to Case Management Department for coordinating discharge planning if the patient needs post-hospital services based on physician/advanced practitioner order or complex needs related to functional status, cognitive ability, or social support system  Outcome: Progressing     Problem: Knowledge Deficit  Goal: Patient/family/caregiver demonstrates understanding of disease process, treatment plan, medications, and discharge instructions  Description: Complete learning assessment and assess knowledge base.  Interventions:  - Provide teaching at level of understanding  - Provide teaching via preferred learning methods  Outcome:  Progressing

## 2024-03-12 NOTE — DISCHARGE INSTR - AVS FIRST PAGE
Return to the emergency department with any concerning symptoms.    Please follow-up with your family doctor within 1 week of discharge.  I would recommend to have your family doctor repeat your electrolytes and blood counts as an outpatient.  I would recommend a CBC, complete blood count and a BMP, basic metabolic panel ordered by your family doctor within 1 week of discharge.    Please arrange an appointment with hematology.

## 2024-03-12 NOTE — ASSESSMENT & PLAN NOTE
Recent Labs     03/11/24  0842 03/11/24  1725 03/12/24  0539   HGB 6.8* 8.0* 8.0*       Baseline Hgb 9-10, no acute source of bleeding   Vit B 12 repleted on 03/11  Iron panel consistent with iron deficiency anemia; s/p Venofer x 2 doses   Occult stool sample pending   S/p 1 u PRBC   Transfuse Hgb <7  Last colonoscopy 1 year ago   Globin remained stable

## 2024-03-12 NOTE — PLAN OF CARE
Problem: GASTROINTESTINAL - ADULT  Goal: Minimal or absence of nausea and/or vomiting  Description: INTERVENTIONS:  - Administer IV fluids if ordered to ensure adequate hydration  - Maintain NPO status until nausea and vomiting are resolved  - Nasogastric tube if ordered  - Administer ordered antiemetic medications as needed  - Provide nonpharmacologic comfort measures as appropriate  - Advance diet as tolerated, if ordered  - Consider nutrition services referral to assist patient with adequate nutrition and appropriate food choices  Outcome: Progressing  Goal: Maintains or returns to baseline bowel function  Description: INTERVENTIONS:  - Assess bowel function  - Encourage oral fluids to ensure adequate hydration  - Administer IV fluids if ordered to ensure adequate hydration  - Administer ordered medications as needed  - Encourage mobilization and activity  - Consider nutritional services referral to assist patient with adequate nutrition and appropriate food choices  Outcome: Progressing  Goal: Maintains adequate nutritional intake  Description: INTERVENTIONS:  - Monitor percentage of each meal consumed  - Identify factors contributing to decreased intake, treat as appropriate  - Assist with meals as needed  - Monitor I&O, weight, and lab values if indicated  - Obtain nutrition services referral as needed  Outcome: Progressing     Problem: GENITOURINARY - ADULT  Goal: Maintains or returns to baseline urinary function  Description: INTERVENTIONS:  - Assess urinary function  - Encourage oral fluids to ensure adequate hydration if ordered  - Administer IV fluids as ordered to ensure adequate hydration  - Administer ordered medications as needed  - Offer frequent toileting  - Follow urinary retention protocol if ordered  Outcome: Progressing  Goal: Absence of urinary retention  Description: INTERVENTIONS:  - Assess patient’s ability to void and empty bladder  - Monitor I/O  - Bladder scan as needed  - Discuss with  physician/AP medications to alleviate retention as needed  - Discuss catheterization for long term situations as appropriate  Outcome: Progressing     Problem: PAIN - ADULT  Goal: Verbalizes/displays adequate comfort level or baseline comfort level  Description: Interventions:  - Encourage patient to monitor pain and request assistance  - Assess pain using appropriate pain scale  - Administer analgesics based on type and severity of pain and evaluate response  - Implement non-pharmacological measures as appropriate and evaluate response  - Consider cultural and social influences on pain and pain management  - Notify physician/advanced practitioner if interventions unsuccessful or patient reports new pain  Outcome: Progressing     Problem: SAFETY ADULT  Goal: Patient will remain free of falls  Description: INTERVENTIONS:  - Educate patient/family on patient safety including physical limitations  - Instruct patient to call for assistance with activity   - Consult OT/PT to assist with strengthening/mobility   - Keep Call bell within reach  - Keep bed low and locked with side rails adjusted as appropriate  - Keep care items and personal belongings within reach  - Initiate and maintain comfort rounds  - Make Fall Risk Sign visible to staff  - Offer Toileting every 2 Hours, in advance of need  - Initiate/Maintain alarm  - Obtain necessary fall risk management equipment:   - Apply yellow socks and bracelet for high fall risk patients  - Consider moving patient to room near nurses station  Outcome: Progressing  Goal: Maintain or return to baseline ADL function  Description: INTERVENTIONS:  -  Assess patient's ability to carry out ADLs; assess patient's baseline for ADL function and identify physical deficits which impact ability to perform ADLs (bathing, care of mouth/teeth, toileting, grooming, dressing, etc.)  - Assess/evaluate cause of self-care deficits   - Assess range of motion  - Assess patient's mobility; develop  plan if impaired  - Assess patient's need for assistive devices and provide as appropriate  - Encourage maximum independence but intervene and supervise when necessary  - Involve family in performance of ADLs  - Assess for home care needs following discharge   - Consider OT consult to assist with ADL evaluation and planning for discharge  - Provide patient education as appropriate  Outcome: Progressing  Goal: Maintains/Returns to pre admission functional level  Description: INTERVENTIONS:  - Perform AM-PAC 6 Click Basic Mobility/ Daily Activity assessment daily.  - Set and communicate daily mobility goal to care team and patient/family/caregiver.   - Collaborate with rehabilitation services on mobility goals if consulted  - Perform Range of Motion 3 times a day.  - Reposition patient every 2 hours.  - Dangle patient 3 times a day  - Stand patient 3 times a day  - Ambulate patient 3 times a day  - Out of bed to chair 3 times a day   - Out of bed for meals 3 times a day  - Out of bed for toileting  - Record patient progress and toleration of activity level   Outcome: Progressing     Problem: DISCHARGE PLANNING  Goal: Discharge to home or other facility with appropriate resources  Description: INTERVENTIONS:  - Identify barriers to discharge w/patient and caregiver  - Arrange for needed discharge resources and transportation as appropriate  - Identify discharge learning needs (meds, wound care, etc.)  - Arrange for interpretive services to assist at discharge as needed  - Refer to Case Management Department for coordinating discharge planning if the patient needs post-hospital services based on physician/advanced practitioner order or complex needs related to functional status, cognitive ability, or social support system  Outcome: Progressing     Problem: Knowledge Deficit  Goal: Patient/family/caregiver demonstrates understanding of disease process, treatment plan, medications, and discharge instructions  Description:  Complete learning assessment and assess knowledge base.  Interventions:  - Provide teaching at level of understanding  - Provide teaching via preferred learning methods  Outcome: Progressing

## 2024-03-12 NOTE — ASSESSMENT & PLAN NOTE
Presented w/ c/o left-sided flank pain & epigastric pain  POA, lipase 510 -> 55 - > 76  CT stone study  (03/10): Bilateral nonobstructing renal calcul   Recent tx for sinus infxn, s/p course of Augmentin and now currently on antifungal agents   Etiology of suspected medication induced pancreatitis?   TG WNL   Patient is still having mild tenderness but tolerating diet.  At this time, patient will be discharged with p.o. pain medication and follow-up with PCP and gastroenterologist outpatient basis.

## 2024-03-13 VITALS
DIASTOLIC BLOOD PRESSURE: 54 MMHG | RESPIRATION RATE: 18 BRPM | SYSTOLIC BLOOD PRESSURE: 116 MMHG | OXYGEN SATURATION: 97 % | BODY MASS INDEX: 21.99 KG/M2 | TEMPERATURE: 98.1 F | HEIGHT: 62 IN | HEART RATE: 67 BPM | WEIGHT: 119.49 LBS

## 2024-03-13 LAB
ALBUMIN SERPL BCP-MCNC: 3 G/DL (ref 3.5–5)
ALP SERPL-CCNC: 33 U/L (ref 34–104)
ALT SERPL W P-5'-P-CCNC: 8 U/L (ref 7–52)
ANION GAP SERPL CALCULATED.3IONS-SCNC: 4 MMOL/L (ref 4–13)
AST SERPL W P-5'-P-CCNC: 13 U/L (ref 13–39)
BILIRUB SERPL-MCNC: 0.28 MG/DL (ref 0.2–1)
BUN SERPL-MCNC: 12 MG/DL (ref 5–25)
CALCIUM ALBUM COR SERPL-MCNC: 8.7 MG/DL (ref 8.3–10.1)
CALCIUM SERPL-MCNC: 7.9 MG/DL (ref 8.4–10.2)
CHLORIDE SERPL-SCNC: 109 MMOL/L (ref 96–108)
CO2 SERPL-SCNC: 27 MMOL/L (ref 21–32)
CREAT SERPL-MCNC: 0.95 MG/DL (ref 0.6–1.3)
ERYTHROCYTE [DISTWIDTH] IN BLOOD BY AUTOMATED COUNT: 22.7 % (ref 11.6–15.1)
GFR SERPL CREATININE-BSD FRML MDRD: 75 ML/MIN/1.73SQ M
GLUCOSE SERPL-MCNC: 84 MG/DL (ref 65–140)
HCT VFR BLD AUTO: 27.8 % (ref 34.8–46.1)
HGB BLD-MCNC: 8.1 G/DL (ref 11.5–15.4)
LIPASE SERPL-CCNC: 31 U/L (ref 11–82)
MCH RBC QN AUTO: 19.5 PG (ref 26.8–34.3)
MCHC RBC AUTO-ENTMCNC: 29.1 G/DL (ref 31.4–37.4)
MCV RBC AUTO: 67 FL (ref 82–98)
PLATELET # BLD AUTO: 232 THOUSANDS/UL (ref 149–390)
PMV BLD AUTO: 10.1 FL (ref 8.9–12.7)
POTASSIUM SERPL-SCNC: 3.4 MMOL/L (ref 3.5–5.3)
PROT SERPL-MCNC: 5.4 G/DL (ref 6.4–8.4)
RBC # BLD AUTO: 4.15 MILLION/UL (ref 3.81–5.12)
SODIUM SERPL-SCNC: 140 MMOL/L (ref 135–147)
WBC # BLD AUTO: 6.89 THOUSAND/UL (ref 4.31–10.16)

## 2024-03-13 PROCEDURE — 85027 COMPLETE CBC AUTOMATED: CPT | Performed by: PHYSICIAN ASSISTANT

## 2024-03-13 PROCEDURE — 80053 COMPREHEN METABOLIC PANEL: CPT | Performed by: PHYSICIAN ASSISTANT

## 2024-03-13 PROCEDURE — 99239 HOSP IP/OBS DSCHRG MGMT >30: CPT | Performed by: FAMILY MEDICINE

## 2024-03-13 PROCEDURE — 83690 ASSAY OF LIPASE: CPT | Performed by: PHYSICIAN ASSISTANT

## 2024-03-13 RX ORDER — OXYCODONE HYDROCHLORIDE AND ACETAMINOPHEN 5; 325 MG/1; MG/1
1 TABLET ORAL EVERY 4 HOURS PRN
Status: DISCONTINUED | OUTPATIENT
Start: 2024-03-13 | End: 2024-03-13 | Stop reason: HOSPADM

## 2024-03-13 RX ORDER — AMOXICILLIN 250 MG
1 CAPSULE ORAL 2 TIMES DAILY
Qty: 30 TABLET | Refills: 0 | Status: SHIPPED | OUTPATIENT
Start: 2024-03-13

## 2024-03-13 RX ORDER — FERROUS SULFATE 324(65)MG
324 TABLET, DELAYED RELEASE (ENTERIC COATED) ORAL
Qty: 60 TABLET | Refills: 0 | Status: SHIPPED | OUTPATIENT
Start: 2024-03-13 | End: 2024-04-12

## 2024-03-13 RX ORDER — POLYETHYLENE GLYCOL 3350 17 G/17G
17 POWDER, FOR SOLUTION ORAL DAILY
Qty: 30 EACH | Refills: 0 | Status: SHIPPED | OUTPATIENT
Start: 2024-03-13

## 2024-03-13 RX ORDER — OXYCODONE HYDROCHLORIDE AND ACETAMINOPHEN 5; 325 MG/1; MG/1
1 TABLET ORAL EVERY 4 HOURS PRN
Qty: 18 TABLET | Refills: 0 | Status: SHIPPED | OUTPATIENT
Start: 2024-03-13

## 2024-03-13 RX ORDER — OXYCODONE HYDROCHLORIDE AND ACETAMINOPHEN 5; 325 MG/1; MG/1
1 TABLET ORAL EVERY 4 HOURS PRN
Status: DISCONTINUED | OUTPATIENT
Start: 2024-03-13 | End: 2024-03-13

## 2024-03-13 RX ADMIN — FENTANYL CITRATE 50 MCG: 50 INJECTION INTRAMUSCULAR; INTRAVENOUS at 00:04

## 2024-03-13 RX ADMIN — SENNOSIDES AND DOCUSATE SODIUM 1 TABLET: 8.6; 5 TABLET ORAL at 08:24

## 2024-03-13 RX ADMIN — DICYCLOMINE HYDROCHLORIDE 10 MG: 10 CAPSULE ORAL at 05:12

## 2024-03-13 RX ADMIN — OXYCODONE HYDROCHLORIDE AND ACETAMINOPHEN 1 TABLET: 5; 325 TABLET ORAL at 10:11

## 2024-03-13 RX ADMIN — FENTANYL CITRATE 50 MCG: 50 INJECTION INTRAMUSCULAR; INTRAVENOUS at 05:12

## 2024-03-13 RX ADMIN — GABAPENTIN 300 MG: 300 CAPSULE ORAL at 08:24

## 2024-03-13 NOTE — NURSING NOTE
Patient left with all belongings. Patient verbalized understanding of discharge instructions discussed with virtual nurse. Patient escorted off unit by RN for discharge.

## 2024-03-13 NOTE — ASSESSMENT & PLAN NOTE
H/o chronic hypokalemia   PTA: 80 mEq of IV KCl q day infusion via port   Known to outpt nephrology   Patient potassium is running low, will give another supplement.

## 2024-03-13 NOTE — DISCHARGE SUMMARY
[facility]  Discharge- Brittani Patino 1985, 39 y.o. female MRN: [id number]  Unit/Bed#: [facility]-01 Encounter: [id number]  Primary Care Provider: Horace Vasquez MD   Date and time admitted to hospital: 3/10/2024  1:59 PM    * Idiopathic acute pancreatitis without infection or necrosis  Assessment & Plan  Presented w/ c/o left-sided flank pain & epigastric pain  POA, lipase 510 -> 55 - > 76  CT stone study  (03/10): Bilateral nonobstructing renal calcul   Recent tx for sinus infxn, s/p course of Augmentin and now currently on antifungal agents   Etiology of suspected medication induced pancreatitis?   TG WNL   Patient is still having mild tenderness but tolerating diet.  At this time, patient will be discharged with p.o. pain medication and follow-up with PCP and gastroenterologist outpatient basis.    Magnesium deficiency  Assessment & Plan  Resolved, repleted      B12 deficiency  Assessment & Plan  PTA B 12 injection 1000 mcg q 30 days; last dose 03/01/24       Acute on chronic anemia  Assessment & Plan  Recent Labs     03/11/24  0842 03/11/24  1725 03/12/24  0539   HGB 6.8* 8.0* 8.0*       Baseline Hgb 9-10, no acute source of bleeding   Vit B 12 repleted on 03/11  Iron panel consistent with iron deficiency anemia; s/p Venofer x 2 doses   Occult stool sample pending   S/p 1 u PRBC   Transfuse Hgb <7  Last colonoscopy 1 year ago   Globin remained stable    Hypokalemia  Assessment & Plan  H/o chronic hypokalemia   PTA: 80 mEq of IV KCl q day infusion via port   Known to outpt nephrology   Patient potassium is running low, will give another supplement.      Medical Problems       Resolved Problems  Date Reviewed: 3/10/2024   None       Discharging Physician / Practitioner: Dominik Eldridge MD  PCP: Horace Vasquez MD  Admission Date:   Admission Orders (From admission, onward)       Ordered        03/10/24 1549  INPATIENT ADMISSION  Once                          Discharge Date:  03/13/24    Consultations During Hospital Stay:  none    Procedures Performed:   CT renal stone study abdomen pelvis without contrast   Final Result by Sophie Richmond MD (03/10 1533)      Bilateral nonobstructing renal calculi.            Workstation performed: [id number]               Significant Findings / Test Results:   Lab Results   Component Value Date    WBC 6.89 03/13/2024    HGB 8.1 (L) 03/13/2024    HCT 27.8 (L) 03/13/2024    MCV 67 (L) 03/13/2024     03/13/2024     Lab Results   Component Value Date    SODIUM 140 03/13/2024    K 3.4 (L) 03/13/2024     (H) 03/13/2024    CO2 27 03/13/2024    AGAP 4 03/13/2024    BUN 12 03/13/2024    CREATININE 0.95 03/13/2024    GLUC 84 03/13/2024    GLUF 85 02/05/2024    CALCIUM 7.9 (L) 03/13/2024    AST 13 03/13/2024    ALT 8 03/13/2024    ALKPHOS 33 (L) 03/13/2024    TP 5.4 (L) 03/13/2024    TBILI 0.28 03/13/2024    EGFR 75 03/13/2024         Incidental Findings:   As mentioned above  I reviewed the above mentioned incidental findings with the patient and/or family and they expressed understanding.    Test Results Pending at Discharge (will require follow up):   none     Outpatient Tests Requested:  none    Complications:  none    Reason for Admission: Abdominal pain    Hospital Course:   Brittani Patino is a 39 y.o. female patient who originally presented to the hospital on 3/10/2024 due to abdominal pain secondary to diabetic acute pancreatitis, patient admitted under above diagnosis and received conservative management with IV hydration, pain medication.  With the treatment, patient condition improved, discharged, patient is still having mild pain.  Tolerating diet.  At this time, we will discharging back patient to home with follow-up with PCP and her gastroenterologist outpatient basis.    All lab results, imaging findings, treatment plan and option discussed in details with patient.  Verbalized understanding agrees.  Please see above list of diagnoses  "and related plan for additional information.     Condition at Discharge: stable    Discharge Day Visit / Exam:   Subjective: Seen and evaluated during the event.  Laying on the bed.  Resting comfortably.  Tolerating breakfast.  Vitals: Blood Pressure: 116/54 (03/13/24 0809)  Pulse: 67 (03/13/24 0809)  Temperature: 98.1 °F (36.7 °C) (03/13/24 0809)  Temp Source: Temporal (03/13/24 0809)  Respirations: 18 (03/13/24 0809)  Height: 5' 2\" (157.5 cm) (03/10/24 1404)  Weight - Scale: 54.2 kg (119 lb 7.8 oz) (03/13/24 0557)  SpO2: 97 % (03/13/24 1011)  Exam:   Physical Exam  Vitals and nursing note reviewed.   Constitutional:       Appearance: Normal appearance. She is not ill-appearing or diaphoretic.   Eyes:      General: No scleral icterus.        Left eye: No discharge.      Extraocular Movements: Extraocular movements intact.      Conjunctiva/sclera: Conjunctivae normal.      Pupils: Pupils are equal, round, and reactive to light.   Cardiovascular:      Rate and Rhythm: Normal rate.      Heart sounds: Normal heart sounds. No murmur heard.     No friction rub. No gallop.   Pulmonary:      Effort: Pulmonary effort is normal. No respiratory distress.      Breath sounds: No stridor. No wheezing or rhonchi.   Abdominal:      General: Abdomen is flat. Bowel sounds are normal. There is no distension.      Palpations: There is no mass.      Tenderness: There is abdominal tenderness (mild).      Hernia: No hernia is present.   Musculoskeletal:      Right lower leg: No edema.      Left lower leg: No edema.   Skin:     General: Skin is warm.   Neurological:      General: No focal deficit present.      Mental Status: She is alert and oriented to person, place, and time.      Cranial Nerves: No cranial nerve deficit.      Sensory: No sensory deficit.      Motor: No weakness.      Coordination: Coordination normal.          Discussion with Family:  with patient.     Discharge instructions/Information to patient and family:   See " after visit summary for information provided to patient and family.      Provisions for Follow-Up Care:  See after visit summary for information related to follow-up care and any pertinent home health orders.      Mobility at time of Discharge:   Basic Mobility Inpatient Raw Score: 24  JH-HLM Goal: 8: Walk 250 feet or more  JH-HLM Achieved: 8: Walk 250 feet ot more  HLM Goal achieved. Continue to encourage appropriate mobility.     Disposition:   Home    Planned Readmission: If condition get worse     Discharge Statement:  Greater than 50% of the total time was spent examining patient, answering all patient questions, arranging and discussing plan of care with patient as well as directly providing post-discharge instructions.  Additional time then spent on discharge activities.    Discharge Medications:  See after visit summary for reconciled discharge medications provided to patient and/or family.      **Please Note: This note may have been constructed using a voice recognition system**

## 2024-03-14 NOTE — UTILIZATION REVIEW
NOTIFICATION OF ADMISSION DISCHARGE   This is a Notification of Discharge from [facility]. Please be advised that this patient has been discharge from our facility. Below you will find the admission and discharge date and time including the patient’s disposition.   UTILIZATION REVIEW CONTACT:  Nancy Cummings  Utilization   Network Utilization Review Department  Phone: [phone] x carefully listen to the prompts. All voicemails are confidential.  Email: [email or web address]     ADMISSION INFORMATION  PRESENTATION DATE: 3/10/2024  1:59 PM  OBERVATION ADMISSION DATE:   INPATIENT ADMISSION DATE: 3/10/24  3:49 PM   DISCHARGE DATE: 3/13/2024 12:49 PM   DISPOSITION:Home/Self Care    Network Utilization Review Department  ATTENTION: Please call with any questions or concerns to [phone] and carefully listen to the prompts so that you are directed to the right person. All voicemails are confidential.   For Discharge needs, contact Care Management DC Support Team at [phone] opt. 2  Send all requests for admission clinical reviews, approved or denied determinations and any other requests to dedicated fax number below belonging to the campus where the patient is receiving treatment. List of dedicated fax numbers for the Facilities:  FACILITY NAME UR FAX NUMBER   ADMISSION DENIALS (Administrative/Medical Necessity) [phone]   DISCHARGE SUPPORT TEAM ([facility]) [phone]   PARENT CHILD HEALTH (Maternity/NICU/Pediatrics) [phone]   [facility] [phone]   [facility] [phone]   [facility] [phone]   [facility] [phone]   [facility] [phone]   [facility] [phone]   [facility] [phone]   [facility]  [phone]   [facility] [phone]   [facility] [phone]   [facility] [phone]   [facility] [phone]

## 2024-03-29 ENCOUNTER — TELEPHONE (OUTPATIENT)
Age: 39
End: 2024-03-29

## 2024-03-29 ENCOUNTER — APPOINTMENT (OUTPATIENT)
Dept: LAB | Facility: HOSPITAL | Age: 39
End: 2024-03-29
Payer: COMMERCIAL

## 2024-03-29 ENCOUNTER — TELEPHONE (OUTPATIENT)
Dept: NEPHROLOGY | Facility: CLINIC | Age: 39
End: 2024-03-29

## 2024-03-29 DIAGNOSIS — E87.6 HYPOKALEMIA: ICD-10-CM

## 2024-03-29 DIAGNOSIS — E87.6 HYPOKALEMIA: Primary | ICD-10-CM

## 2024-03-29 NOTE — TELEPHONE ENCOUNTER
Pt is at the lab  needs her standing weekly order put in the system. She's at the lab in [place]

## 2024-03-29 NOTE — TELEPHONE ENCOUNTER
I called and left a VM to call the office to discuss the following:    ----- Message from ARIADNA Sarkar sent at 3/29/2024 11:55 AM EDT -----  Potassium 3.9. no change

## 2024-04-02 RX ORDER — FLUCONAZOLE 150 MG/1
TABLET ORAL
COMMUNITY
Start: 2024-03-06 | End: 2024-05-01

## 2024-04-20 NOTE — ED NOTES
Dr Clarence Nissen made aware of poct glucose     Lluvia Sarah, RN  04/30/22 0939
Patient ambulated tot he bathroom        Juan Walls RN  04/30/22 7660
Patient changed into a gown while in the wheelchair  Patient's arms lifted above head to place her in a gown  Patient able to hold arms above head during seizure-like activity  Patient responding appropriately to questions during seizure-like activity        Nathalia Navarro RN  04/30/22 0942
Patient transported to 66Joy Willis RN  04/30/22 8036
Per-provider, it's okay to use patient's already accessed port for blood work        Paty Persaud, RN  04/30/22 7790
Report called to [facility]  Patient awaiting EMS transport        Sherlyn Felder RN  04/30/22 7756
Yes

## 2024-04-22 ENCOUNTER — TELEPHONE (OUTPATIENT)
Dept: NEUROLOGY | Facility: CLINIC | Age: 39
End: 2024-04-22

## 2024-04-23 ENCOUNTER — APPOINTMENT (OUTPATIENT)
Dept: LAB | Facility: HOSPITAL | Age: 39
End: 2024-04-23
Payer: COMMERCIAL

## 2024-04-23 DIAGNOSIS — E87.6 HYPOKALEMIA: ICD-10-CM

## 2024-04-23 LAB
ANION GAP SERPL CALCULATED.3IONS-SCNC: 10 MMOL/L (ref 4–13)
BUN SERPL-MCNC: 13 MG/DL (ref 5–25)
CALCIUM SERPL-MCNC: 8.8 MG/DL (ref 8.4–10.2)
CHLORIDE SERPL-SCNC: 107 MMOL/L (ref 96–108)
CO2 SERPL-SCNC: 20 MMOL/L (ref 21–32)
CREAT SERPL-MCNC: 1.1 MG/DL (ref 0.6–1.3)
GFR SERPL CREATININE-BSD FRML MDRD: 62 ML/MIN/1.73SQ M
GLUCOSE P FAST SERPL-MCNC: 78 MG/DL (ref 65–99)
POTASSIUM SERPL-SCNC: 3.4 MMOL/L (ref 3.5–5.3)
SODIUM SERPL-SCNC: 137 MMOL/L (ref 135–147)

## 2024-04-23 PROCEDURE — 36415 COLL VENOUS BLD VENIPUNCTURE: CPT

## 2024-04-23 PROCEDURE — 80048 BASIC METABOLIC PNL TOTAL CA: CPT

## 2024-04-29 ENCOUNTER — APPOINTMENT (OUTPATIENT)
Dept: LAB | Facility: HOSPITAL | Age: 39
End: 2024-04-29
Payer: COMMERCIAL

## 2024-04-29 DIAGNOSIS — E87.6 HYPOKALEMIA: ICD-10-CM

## 2024-04-29 LAB
ANION GAP SERPL CALCULATED.3IONS-SCNC: 7 MMOL/L (ref 4–13)
BUN SERPL-MCNC: 13 MG/DL (ref 5–25)
CALCIUM SERPL-MCNC: 8.5 MG/DL (ref 8.4–10.2)
CHLORIDE SERPL-SCNC: 111 MMOL/L (ref 96–108)
CO2 SERPL-SCNC: 20 MMOL/L (ref 21–32)
CREAT SERPL-MCNC: 1.06 MG/DL (ref 0.6–1.3)
GFR SERPL CREATININE-BSD FRML MDRD: 65 ML/MIN/1.73SQ M
GLUCOSE P FAST SERPL-MCNC: 81 MG/DL (ref 65–99)
POTASSIUM SERPL-SCNC: 3.7 MMOL/L (ref 3.5–5.3)
SODIUM SERPL-SCNC: 138 MMOL/L (ref 135–147)

## 2024-04-29 PROCEDURE — 80048 BASIC METABOLIC PNL TOTAL CA: CPT

## 2024-04-29 PROCEDURE — 36415 COLL VENOUS BLD VENIPUNCTURE: CPT

## 2024-05-01 ENCOUNTER — CONSULT (OUTPATIENT)
Dept: NEUROLOGY | Facility: CLINIC | Age: 39
End: 2024-05-01
Payer: COMMERCIAL

## 2024-05-01 VITALS
HEIGHT: 62 IN | WEIGHT: 113 LBS | DIASTOLIC BLOOD PRESSURE: 54 MMHG | SYSTOLIC BLOOD PRESSURE: 117 MMHG | BODY MASS INDEX: 20.8 KG/M2 | TEMPERATURE: 96.8 F | HEART RATE: 70 BPM

## 2024-05-01 DIAGNOSIS — G43.709 CHRONIC MIGRAINE WITHOUT AURA WITHOUT STATUS MIGRAINOSUS, NOT INTRACTABLE: Primary | ICD-10-CM

## 2024-05-01 PROCEDURE — 99215 OFFICE O/P EST HI 40 MIN: CPT

## 2024-05-01 RX ORDER — AMITRIPTYLINE HYDROCHLORIDE 10 MG/1
TABLET, FILM COATED ORAL
Qty: 150 TABLET | Refills: 1 | Status: SHIPPED | OUTPATIENT
Start: 2024-05-01

## 2024-05-01 RX ORDER — CEFUROXIME AXETIL 250 MG/1
0.5 TABLET ORAL AS NEEDED
Qty: 3 ML | Refills: 3 | Status: SHIPPED | OUTPATIENT
Start: 2024-05-01

## 2024-05-01 NOTE — LETTER
May 1, 2024     Horace Vasquez IV, MD  [address]  [place] [address]    Patient: Brittani Patino   YOB: 1985   Date of Visit: 5/1/2024       Dear Dr. Vasquez:    Thank you for referring Brittani Patino to me for evaluation. Below are my notes for this consultation.    If you have questions, please do not hesitate to call me. I look forward to following your patient along with you.         Sincerely,        ARIADNA Gtz        CC: No Recipients    ARIADNA Gtz  5/1/2024  9:41 AM  Sign when Signing Visit  Patient ID: Brittani Patino is a 39 y.o. female.    Assessment/Plan:    Chronic migraine without aura without status migrainosus, not intractable  Brittani Patino is a 39 year old female with a hx of migraine headaches since 2016. She reports an acute worsening of migraine frequency and severity since December 2023 after a severe vertigo attack. She reports her migraines were previously better controlled on Topamax 100 mg bid and Amitriptyline 50 mg (was on up to 100 mg in the past) and Sumatriptan as needed. Due to nephrolithiasis and chronic hypokalemia Topamax was discontinued in the remote past. Her Amitriptyline was also decreased to 20 mg over that time due to control of headaches and other GI symptoms. We discussed today natural options such as cefaly, nerivio, or gammacore. I encouraged her to continue with adequate hydration, sleep, and vitamin supplementation. We discussed the option of increasing Amitriptyline as higher doses have been effective in the past. Other options include Emgality/ Ajovy/ Aimovig vs Nurtec QOD vs Qulipta. I would defer topamax/zonisamide given prior hx of renal calculi and chronic hypokalemia, would defer betablockers given low-normal BP with likelihood of hypotension, would defer addition of another SSRI/SNRI/TCA given she is already on amitriptyline and lexparo. She felt gabapentin in the past made her headaches worse. After  thorough discussion she is agreeable to increasing Amitriptyline prior to considering initiation of CGRP inhibitor. For migraine , she reports fast and effective relief of migraines with sumatriptan SC. She is agreeable to resuming use as needed, no more than 3 times per week. She was advised she may use this in combination with or without Tylenol and Zofran. She will follow up in 3 months; sooner if needed. Plan as outlined below.    Plan:  Headache/migraine treatment:   - When you have a moderate to severe headache, you should seek rest, initiate relaxation and apply cold compresses to the head.      Abortive medications (for immediate treatment of a headache):   It is ok to take ibuprofen, acetaminophen or naproxen (Advil, Tylenol,  Aleve, Excedrin) if they help your headaches you should limit these to no more than 3 times a week to avoid medication overuse/rebound headaches.     Take at the onset of a migraine use Sumatriptan SC 6 mg +/- zofran 4 mg +/- Tylenol 1000 mg  You may repeat sumatriptan 6 mg in 1 hour if needed. Max 12 mg/day, Max 3 days per week.       Over the counter preventive supplements for headaches/migraines   (to take every day to help prevent headaches - not to take at the time of headache):  [x] Magnesium 500 mg 1-2 times daily (If any diarrhea or upset stomach, decrease dose  as tolerated)  [x] Riboflavin (Vitamin B2) 400mg daily (FYI B2 may make your urine bright/neon yellow)     Prescription preventive medications for headaches/migraines   (to take every day to help prevent headaches - not to take at the time of headache):    Increase Amitriptyline 20 mg to 30 mg x 2 weeks, then 40 mg x 2 weeks, then 50 mg thereafter; stopping at the lowest effective dose  Send me a [employer] message once you have been on 50 mg at least 2 weeks and we can consider increasing further if needed      *Typically these types of medications take time untill you see the benefit, although some may see  "improvement in days, often it may take weeks, especially if the medication is being titrated up to a beneficial level. Please contact us if there are any concerns or questions regarding the medication.      Self-Monitoring:  [x] Headache calendar. Each day donna a number from 0-10 indicating if there was a headache and how bad it was.  This can be used to monitor gradual improvement and is helpful to make medication adjustments. You can do this on paper or there is an ANNY for a smart phone called \"Migraine e Diary\".      Lifestyle Recommendations:  [x] SLEEP - Maintain a regular sleep schedule: Adults need at least 7-8 hours of uninterrupted a night. Maintain good sleep hygiene:  Going to bed and waking up at consistent times, avoiding excessive daytime naps, avoiding caffeinated beverages in the evening, avoid excessive stimulation in the evening and generally using bed primarily for sleeping.  One hour before bedtime would recommend turning lights down lower, decreasing your activity (may read quietly, listen to music at a low volume). When you get into bed, should eliminate all technology (no texting, emailing, playing with your phone, iPad or tablet in bed).  [x] HYDRATION - Maintain good hydration.  Drink  2L of fluid a day (4 typical small water bottles)  [x] DIET - Maintain good nutrition. In particular don't skip meals and try and eat healthy balanced meals regularly.  [x] TRIGGERS - Look for other triggers and avoid them: Limit caffeine to 1-2 cups a day or less. Avoid dietary triggers that you have noticed bring on your headaches (this could include aged cheese, peanuts, MSG, aspartame and nitrates).  [x] EXERCISE - physical exercise as we all know is good for you in many ways, and not only is good for your heart, but also is beneficial for your mental health, cognitive health and  chronic pain/headaches. I would encourage at the least 5 days of physical exercise weekly for at least 30 minutes.    "   Education and Follow-up  [x] Please call with any questions or concerns. Of course if any new concerning symptoms go to the emergency department.  [x] Follow up in 3 months, sooner if needed       Diagnoses and all orders for this visit:    Chronic migraine without aura without status migrainosus, not intractable  -     Ambulatory Referral to Neurology  -     amitriptyline (ELAVIL) 10 mg tablet; Take 3 tabs at bedtime x 2 weeks, then 4 tabs at bedtime x 2 weeks, then 5 tabs at bedtime thereafter  -     SUMAtriptan Succinate 6 MG/0.5ML SOAJ; Inject 0.5 mL (6 mg total) under the skin as needed (migraine) May repeat once in 1 hour if needed. Max 12 mg/day, Max 3 days per week         I have spent a total time of 60 minutes on 05/01/24 in caring for this patient including Diagnostic results, Prognosis, Risks and benefits of tx options, Instructions for management, Patient and family education, Importance of tx compliance, Risk factor reductions, Impressions, Documenting in the medical record, Reviewing / ordering tests, medicine, procedures  , and Obtaining or reviewing history  .      Subjective:    LUIS FELIPE Patino is a 39 year old female with a pmhx of migraine headaches, hypertension, GERD, psychogenic nonepileptic spells, uretal calculus/nephrolithiasis (1/2023), anemia, s/p gastric bypass, chronic abdominal pain, fatigue, and vitamin and potassium deficiency. She presents to the office today as a self referral for evaluation and treatment of her migraine headaches.     Previously followed by [facility], last seen 3/2/2020. At that time she was maintained on:  Topamax 100 mg bid  Amitriptyline 50 mg at bedtime (up to 100 mg in the past)  Sumatriptan 100 mg PO as needed  Meclizine 25 mg as needed for dizziness/head spinning sensation  Zofran as needed for nausea  Had tried 1 dose of Botox in the past      She returns today and states s/p head injury in 2016 while training for [employer] where she  describes being kneed in the temple, her migraines have been worse and has vertigo attacks. She states she had been having migraine three times per week, but in December 2023 she had a severe vertigo attack that triggered a migraine and since then she is dealing with daily migraines that cause severe bilateral blurred and double vision (only present with a migraine).  She sees Ophthalmology routinely, last seen 1 year ago. She describes her migraines in more detail below.       Description of Headaches:  Location of pain: right-sided unilateral, temporal  Radiation of pain?:right-sided unilateral, temporal, parietal, frontal, anterior neck  Character of pain:throbbing  Severity of pain: 6-7/10  Accompanying symptoms:nausea, vomiting, sonophobia, photophobia, photopsia, diplopia, mental status changes- cloudiness that causes difficulty with concentration, blurred vision   Prodromal sx?: denies aura  Rapidity of onset: sudden  Typical duration of individual headache: 24 hours   Frequency of headaches: every day   Are you ever headache free? Usually only headache free with sleep   Are most headaches similar in presentation? yes  Exacerbating factors:none  Typical precipitants: None identified     Temporal Pattern of Headaches:  Started having HA's: 2016 s/p head injury; denies headaches or migraines prior to this   Worst time of day: none   Awaken from sleep?: no  Seasonal pattern?: yes - spring/fall   'Clustering' of HA's over time? no  Overall pattern since problem began: stable unchanged since December 2023    Degree of Functional Impairment: moderate; depends on the day. Severe migraines cause her to lay down on the couch in a dark room. Other days she can function.     Current Use of Meds to Treat HA:  Abortive meds? Does not currently take any medications because nothing helps.  Daily use? no  Preventive meds?  Amitriptyline 20 mg  Non-Medical/Alternative treatments for HA: yes - acupuncture, Accupressure, daithe  "piercing     Additional Relevant History:  History of head/neck trauma? yes - s/p head injury in 2016 while training for [employer] where she describes being kneed in the temple  History of head/neck surgery? no  Family h/o headache problems?yes - none  Family history of aneurysms? no  Exposure to carbon monoxide? no  Substance use: alcohol: none, illicit drugs: none, tobacco: none, caffeine: none  Multivitamin daily, Vitamin K, A, D, and B12, Potassium, Iron, or melatonin as needed   Water: 16 oz x 4 daily  Sleep: close to 8 hours; no concerns with sleep  Diet: clean diet, several meals throughout the day (3 meals 3 snacks)  S/p Hysterectomy; no plans for pregnancy   She is a stay at home mom   2 children (11 and 15) and      Prior Evaluation/Treatments:  Have you seen another physician for your headaches? yes - [facility] neurology   Prior work-up: MRI brain, MRA head, CT head- all unremarkable   Trigger point injections? no  Botox injections? yes - once (vomiting x 2 days and worse migraine ever)  Epidural injections? no  Prior PREVENTATIVE medications: antidepressants (amitriptyline), topiramate- this was d/c due to renal stones  Prior ABORTIVE mediations: acetaminophen, NSAIDs (ibuprofen), Tylenol Arthritis, sumatriptan PO- partially effective. Sumatriptan SC- effective quickly.  Gabapentin prescribed for surgical pain made migraines worse. Zofran as needed.    The following portions of the patient's history were reviewed and updated as appropriate: allergies, current medications, past family history, past medical history, past social history, past surgical history, and problem list.     Objective:    Blood pressure 117/54, pulse 70, temperature (!) 96.8 °F (36 °C), temperature source Temporal, height 5' 2\" (1.575 m), weight 51.3 kg (113 lb).    Physical Exam  Vitals reviewed.   Constitutional:       Appearance: Normal appearance. She is not toxic-appearing or diaphoretic.   HENT:      Head: Normocephalic " and atraumatic.      Nose: Nose normal.      Mouth/Throat:      Mouth: Mucous membranes are moist.      Pharynx: Oropharynx is clear.   Eyes:      Extraocular Movements: Extraocular movements intact.      Conjunctiva/sclera: Conjunctivae normal.      Pupils: Pupils are equal, round, and reactive to light.   Neck:      Comments: Chest port in place (for potassium infusions)  Cardiovascular:      Rate and Rhythm: Normal rate.      Pulses: Normal pulses.   Pulmonary:      Effort: Pulmonary effort is normal.   Musculoskeletal:         General: Normal range of motion.      Cervical back: Normal range of motion and neck supple.      Right lower leg: No edema.      Left lower leg: No edema.   Skin:     General: Skin is warm.   Neurological:      General: No focal deficit present.      Mental Status: She is alert and oriented to person, place, and time.      Cranial Nerves: No cranial nerve deficit.      Sensory: No sensory deficit.      Motor: No weakness.      Coordination: Coordination normal.      Gait: Gait normal.      Deep Tendon Reflexes: Reflexes normal.   Psychiatric:         Mood and Affect: Mood normal.         Behavior: Behavior normal.         Thought Content: Thought content normal.         Judgment: Judgment normal.       Neurological Examination   On neurological examination patient is alert, awake, oriented and in no distress. Speech is fluent without dysarthria or aphasia. Cranial nerves 2-12 were symmetrically intact bilaterally. No evidence of any focal weakness or sensory loss in the upper or lower extremities. Motor testing reveals 5/5 strength of the bilateral upper and lower extremities.There was no pronator drift.  No fasciculations present. No abnormal involuntary movements. Finger- to-nose reveals no tremor or ataxia and intact proprioceptive function, no dysmetria was noted. Rapid alternating movement normal. Sensation was intact to vibration, light touch, and temperature in bilateral upper and  lower extremities. Deep tendon reflexes were 2+ and symmetric in the bilateral upper and lower extremities. She is able to rise easily without assistance from a seated position. Casual gait is normal including stance, stride, and arm swing. Normal tandem gait. Romberg is absent.        ROS:    Review of Systems  Constitutional:  Negative for appetite change, fatigue and fever.   HENT: Negative.  Negative for hearing loss, tinnitus, trouble swallowing and voice change.    Eyes:  Positive for visual disturbance (double vision in mainly right eye). Negative for photophobia and pain.   Respiratory: Negative.  Negative for shortness of breath.    Cardiovascular: Negative.  Negative for palpitations.   Gastrointestinal:  Positive for nausea (onset of migraines) and vomiting (onset of migraines).   Endocrine: Negative.  Negative for cold intolerance.   Genitourinary: Negative.  Negative for dysuria, frequency and urgency.   Musculoskeletal:  Negative for back pain, gait problem, myalgias, neck pain and neck stiffness.   Skin: Negative.  Negative for rash.   Allergic/Immunologic: Negative.    Neurological:  Positive for headaches (everyday). Negative for dizziness, tremors, seizures, syncope, facial asymmetry, speech difficulty, weakness, light-headedness and numbness.   Hematological: Negative.  Does not bruise/bleed easily.   Psychiatric/Behavioral: Negative.  Negative for confusion, hallucinations and sleep disturbance.    All other systems reviewed and are negative.    Reviewed ROS as entered by medical assistant.

## 2024-05-01 NOTE — ASSESSMENT & PLAN NOTE
Brittani Patino is a 39 year old female with a hx of migraine headaches since . She reports an acute worsening of migraine frequency and severity since 2023 after a severe vertigo attack. She reports her migraines were previously better controlled on Topamax 100 mg bid and Amitriptyline 50 mg (was on up to 100 mg in the past) and Sumatriptan as needed. Due to nephrolithiasis and chronic hypokalemia Topamax was discontinued in the remote past. Her Amitriptyline was also decreased to 20 mg over that time due to control of headaches and other GI symptoms. We discussed today natural options such as cefaly, nerivio, or gammacore. I encouraged her to continue with adequate hydration, sleep, and vitamin supplementation. We discussed the option of increasing Amitriptyline as higher doses have been effective in the past. Other options include Emgality/ Ajovy/ Aimovig vs Nurtec QOD vs Qulipta. I would defer topamax/zonisamide given prior hx of renal calculi and chronic hypokalemia, would defer betablockers given low-normal BP with likelihood of hypotension, would defer addition of another SSRI/SNRI/TCA given she is already on amitriptyline and lexparo. She felt gabapentin in the past made her headaches worse. After thorough discussion she is agreeable to increasing Amitriptyline prior to considering initiation of CGRP inhibitor. For migraine , she reports fast and effective relief of migraines with sumatriptan SC. She is agreeable to resuming use as needed, no more than 3 times per week. She was advised she may use this in combination with or without Tylenol and Zofran. She will follow up in 3 months; sooner if needed. Plan as outlined below.    Plan:  Headache/migraine treatment:   - When you have a moderate to severe headache, you should seek rest, initiate relaxation and apply cold compresses to the head.      Abortive medications (for immediate treatment of a headache):   It is ok to take ibuprofen,  "acetaminophen or naproxen (Advil, Tylenol,  Aleve, Excedrin) if they help your headaches you should limit these to no more than 3 times a week to avoid medication overuse/rebound headaches.     Take at the onset of a migraine use Sumatriptan SC 6 mg +/- zofran 4 mg +/- Tylenol 1000 mg  You may repeat sumatriptan 6 mg in 1 hour if needed. Max 12 mg/day, Max 3 days per week.       Over the counter preventive supplements for headaches/migraines   (to take every day to help prevent headaches - not to take at the time of headache):  [x] Magnesium 500 mg 1-2 times daily (If any diarrhea or upset stomach, decrease dose  as tolerated)  [x] Riboflavin (Vitamin B2) 400mg daily (FYI B2 may make your urine bright/neon yellow)     Prescription preventive medications for headaches/migraines   (to take every day to help prevent headaches - not to take at the time of headache):    Increase Amitriptyline 20 mg to 30 mg x 2 weeks, then 40 mg x 2 weeks, then 50 mg thereafter; stopping at the lowest effective dose  Send me a [employer] message once you have been on 50 mg at least 2 weeks and we can consider increasing further if needed      *Typically these types of medications take time untill you see the benefit, although some may see improvement in days, often it may take weeks, especially if the medication is being titrated up to a beneficial level. Please contact us if there are any concerns or questions regarding the medication.      Self-Monitoring:  [x] Headache calendar. Each day donna a number from 0-10 indicating if there was a headache and how bad it was.  This can be used to monitor gradual improvement and is helpful to make medication adjustments. You can do this on paper or there is an ANNY for a smart phone called \"Migraine e Diary\".      Lifestyle Recommendations:  [x] SLEEP - Maintain a regular sleep schedule: Adults need at least 7-8 hours of uninterrupted a night. Maintain good sleep hygiene:  Going to bed and waking up " at consistent times, avoiding excessive daytime naps, avoiding caffeinated beverages in the evening, avoid excessive stimulation in the evening and generally using bed primarily for sleeping.  One hour before bedtime would recommend turning lights down lower, decreasing your activity (may read quietly, listen to music at a low volume). When you get into bed, should eliminate all technology (no texting, emailing, playing with your phone, iPad or tablet in bed).  [x] HYDRATION - Maintain good hydration.  Drink  2L of fluid a day (4 typical small water bottles)  [x] DIET - Maintain good nutrition. In particular don't skip meals and try and eat healthy balanced meals regularly.  [x] TRIGGERS - Look for other triggers and avoid them: Limit caffeine to 1-2 cups a day or less. Avoid dietary triggers that you have noticed bring on your headaches (this could include aged cheese, peanuts, MSG, aspartame and nitrates).  [x] EXERCISE - physical exercise as we all know is good for you in many ways, and not only is good for your heart, but also is beneficial for your mental health, cognitive health and  chronic pain/headaches. I would encourage at the least 5 days of physical exercise weekly for at least 30 minutes.      Education and Follow-up  [x] Please call with any questions or concerns. Of course if any new concerning symptoms go to the emergency department.  [x] Follow up in 3 months, sooner if needed

## 2024-05-01 NOTE — PROGRESS NOTES
Patient ID: Brittani Patino is a 39 y.o. female.    Assessment/Plan:    Chronic migraine without aura without status migrainosus, not intractable  Brittani Patino is a 39 year old female with a hx of migraine headaches since . She reports an acute worsening of migraine frequency and severity since 2023 after a severe vertigo attack. She reports her migraines were previously better controlled on Topamax 100 mg bid and Amitriptyline 50 mg (was on up to 100 mg in the past) and Sumatriptan as needed. Due to nephrolithiasis and chronic hypokalemia Topamax was discontinued in the remote past. Her Amitriptyline was also decreased to 20 mg over that time due to control of headaches and other GI symptoms. We discussed today natural options such as cefaly, nerivio, or gammacore. I encouraged her to continue with adequate hydration, sleep, and vitamin supplementation. We discussed the option of increasing Amitriptyline as higher doses have been effective in the past. Other options include Emgality/ Ajovy/ Aimovig vs Nurtec QOD vs Qulipta. I would defer topamax/zonisamide given prior hx of renal calculi and chronic hypokalemia, would defer betablockers given low-normal BP with likelihood of hypotension, would defer addition of another SSRI/SNRI/TCA given she is already on amitriptyline and lexparo. She felt gabapentin in the past made her headaches worse. After thorough discussion she is agreeable to increasing Amitriptyline prior to considering initiation of CGRP inhibitor. For migraine , she reports fast and effective relief of migraines with sumatriptan SC. She is agreeable to resuming use as needed, no more than 3 times per week. She was advised she may use this in combination with or without Tylenol and Zofran. She will follow up in 3 months; sooner if needed. Plan as outlined below.    Plan:  Headache/migraine treatment:   - When you have a moderate to severe headache, you should seek rest, initiate  "relaxation and apply cold compresses to the head.      Abortive medications (for immediate treatment of a headache):   It is ok to take ibuprofen, acetaminophen or naproxen (Advil, Tylenol,  Aleve, Excedrin) if they help your headaches you should limit these to no more than 3 times a week to avoid medication overuse/rebound headaches.     Take at the onset of a migraine use Sumatriptan SC 6 mg +/- zofran 4 mg +/- Tylenol 1000 mg  You may repeat sumatriptan 6 mg in 1 hour if needed. Max 12 mg/day, Max 3 days per week.       Over the counter preventive supplements for headaches/migraines   (to take every day to help prevent headaches - not to take at the time of headache):  [x] Magnesium 500 mg 1-2 times daily (If any diarrhea or upset stomach, decrease dose  as tolerated)  [x] Riboflavin (Vitamin B2) 400mg daily (FYI B2 may make your urine bright/neon yellow)     Prescription preventive medications for headaches/migraines   (to take every day to help prevent headaches - not to take at the time of headache):    Increase Amitriptyline 20 mg to 30 mg x 2 weeks, then 40 mg x 2 weeks, then 50 mg thereafter; stopping at the lowest effective dose  Send me a Learnerator message once you have been on 50 mg at least 2 weeks and we can consider increasing further if needed      *Typically these types of medications take time untill you see the benefit, although some may see improvement in days, often it may take weeks, especially if the medication is being titrated up to a beneficial level. Please contact us if there are any concerns or questions regarding the medication.      Self-Monitoring:  [x] Headache calendar. Each day donna a number from 0-10 indicating if there was a headache and how bad it was.  This can be used to monitor gradual improvement and is helpful to make medication adjustments. You can do this on paper or there is an ANNY for a smart phone called \"Migraine e Diary\".      Lifestyle Recommendations:  [x] SLEEP - " Maintain a regular sleep schedule: Adults need at least 7-8 hours of uninterrupted a night. Maintain good sleep hygiene:  Going to bed and waking up at consistent times, avoiding excessive daytime naps, avoiding caffeinated beverages in the evening, avoid excessive stimulation in the evening and generally using bed primarily for sleeping.  One hour before bedtime would recommend turning lights down lower, decreasing your activity (may read quietly, listen to music at a low volume). When you get into bed, should eliminate all technology (no texting, emailing, playing with your phone, iPad or tablet in bed).  [x] HYDRATION - Maintain good hydration.  Drink  2L of fluid a day (4 typical small water bottles)  [x] DIET - Maintain good nutrition. In particular don't skip meals and try and eat healthy balanced meals regularly.  [x] TRIGGERS - Look for other triggers and avoid them: Limit caffeine to 1-2 cups a day or less. Avoid dietary triggers that you have noticed bring on your headaches (this could include aged cheese, peanuts, MSG, aspartame and nitrates).  [x] EXERCISE - physical exercise as we all know is good for you in many ways, and not only is good for your heart, but also is beneficial for your mental health, cognitive health and  chronic pain/headaches. I would encourage at the least 5 days of physical exercise weekly for at least 30 minutes.      Education and Follow-up  [x] Please call with any questions or concerns. Of course if any new concerning symptoms go to the emergency department.  [x] Follow up in 3 months, sooner if needed       Diagnoses and all orders for this visit:    Chronic migraine without aura without status migrainosus, not intractable  -     Ambulatory Referral to Neurology  -     amitriptyline (ELAVIL) 10 mg tablet; Take 3 tabs at bedtime x 2 weeks, then 4 tabs at bedtime x 2 weeks, then 5 tabs at bedtime thereafter  -     SUMAtriptan Succinate 6 MG/0.5ML SOAJ; Inject 0.5 mL (6 mg total)  under the skin as needed (migraine) May repeat once in 1 hour if needed. Max 12 mg/day, Max 3 days per week         I have spent a total time of 60 minutes on 05/01/24 in caring for this patient including Diagnostic results, Prognosis, Risks and benefits of tx options, Instructions for management, Patient and family education, Importance of tx compliance, Risk factor reductions, Impressions, Documenting in the medical record, Reviewing / ordering tests, medicine, procedures  , and Obtaining or reviewing history  .      Subjective:    LUIS FELIPE Patino is a 39 year old female with a pmhx of migraine headaches, hypertension, GERD, psychogenic nonepileptic spells, uretal calculus/nephrolithiasis (1/2023), anemia, s/p gastric bypass, chronic abdominal pain, fatigue, and vitamin and potassium deficiency. She presents to the office today as a self referral for evaluation and treatment of her migraine headaches.     Previously followed by [facility], last seen 3/2/2020. At that time she was maintained on:  Topamax 100 mg bid  Amitriptyline 50 mg at bedtime (up to 100 mg in the past)  Sumatriptan 100 mg PO as needed  Meclizine 25 mg as needed for dizziness/head spinning sensation  Zofran as needed for nausea  Had tried 1 dose of Botox in the past      She returns today and states s/p head injury in 2016 while training for [employer] where she describes being kneed in the temple, her migraines have been worse and has vertigo attacks. She states she had been having migraine three times per week, but in December 2023 she had a severe vertigo attack that triggered a migraine and since then she is dealing with daily migraines that cause severe bilateral blurred and double vision (only present with a migraine).  She sees Ophthalmology routinely, last seen 1 year ago. She describes her migraines in more detail below.       Description of Headaches:  Location of pain: right-sided unilateral, temporal  Radiation of  pain?:right-sided unilateral, temporal, parietal, frontal, anterior neck  Character of pain:throbbing  Severity of pain: 6-7/10  Accompanying symptoms:nausea, vomiting, sonophobia, photophobia, photopsia, diplopia, mental status changes- cloudiness that causes difficulty with concentration, blurred vision   Prodromal sx?: denies aura  Rapidity of onset: sudden  Typical duration of individual headache: 24 hours   Frequency of headaches: every day   Are you ever headache free? Usually only headache free with sleep   Are most headaches similar in presentation? yes  Exacerbating factors:none  Typical precipitants: None identified     Temporal Pattern of Headaches:  Started having HA's: 2016 s/p head injury; denies headaches or migraines prior to this   Worst time of day: none   Awaken from sleep?: no  Seasonal pattern?: yes - spring/fall   'Clustering' of HA's over time? no  Overall pattern since problem began: stable unchanged since December 2023    Degree of Functional Impairment: moderate; depends on the day. Severe migraines cause her to lay down on the couch in a dark room. Other days she can function.     Current Use of Meds to Treat HA:  Abortive meds? Does not currently take any medications because nothing helps.  Daily use? no  Preventive meds?  Amitriptyline 20 mg  Non-Medical/Alternative treatments for HA: yes - acupuncture, Accupressure, daithe piercing     Additional Relevant History:  History of head/neck trauma? yes - s/p head injury in 2016 while training for [employer] where she describes being kneed in the temple  History of head/neck surgery? no  Family h/o headache problems?yes - none  Family history of aneurysms? no  Exposure to carbon monoxide? no  Substance use: alcohol: none, illicit drugs: none, tobacco: none, caffeine: none  Multivitamin daily, Vitamin K, A, D, and B12, Potassium, Iron, or melatonin as needed   Water: 16 oz x 4 daily  Sleep: close to 8 hours; no concerns with sleep  Diet: clean  "diet, several meals throughout the day (3 meals 3 snacks)  S/p Hysterectomy; no plans for pregnancy   She is a stay at home mom   2 children (11 and 15) and      Prior Evaluation/Treatments:  Have you seen another physician for your headaches? yes - [facility] neurology   Prior work-up: MRI brain, MRA head, CT head- all unremarkable   Trigger point injections? no  Botox injections? yes - once (vomiting x 2 days and worse migraine ever)  Epidural injections? no  Prior PREVENTATIVE medications: antidepressants (amitriptyline), topiramate- this was d/c due to renal stones  Prior ABORTIVE mediations: acetaminophen, NSAIDs (ibuprofen), Tylenol Arthritis, sumatriptan PO- partially effective. Sumatriptan SC- effective quickly.  Gabapentin prescribed for surgical pain made migraines worse. Zofran as needed.    The following portions of the patient's history were reviewed and updated as appropriate: allergies, current medications, past family history, past medical history, past social history, past surgical history, and problem list.     Objective:    Blood pressure 117/54, pulse 70, temperature (!) 96.8 °F (36 °C), temperature source Temporal, height 5' 2\" (1.575 m), weight 51.3 kg (113 lb).    Physical Exam  Vitals reviewed.   Constitutional:       Appearance: Normal appearance. She is not toxic-appearing or diaphoretic.   HENT:      Head: Normocephalic and atraumatic.      Nose: Nose normal.      Mouth/Throat:      Mouth: Mucous membranes are moist.      Pharynx: Oropharynx is clear.   Eyes:      Extraocular Movements: Extraocular movements intact.      Conjunctiva/sclera: Conjunctivae normal.      Pupils: Pupils are equal, round, and reactive to light.   Neck:      Comments: Chest port in place (for potassium infusions)  Cardiovascular:      Rate and Rhythm: Normal rate.      Pulses: Normal pulses.   Pulmonary:      Effort: Pulmonary effort is normal.   Musculoskeletal:         General: Normal range of motion. "      Cervical back: Normal range of motion and neck supple.      Right lower leg: No edema.      Left lower leg: No edema.   Skin:     General: Skin is warm.   Neurological:      General: No focal deficit present.      Mental Status: She is alert and oriented to person, place, and time.      Cranial Nerves: No cranial nerve deficit.      Sensory: No sensory deficit.      Motor: No weakness.      Coordination: Coordination normal.      Gait: Gait normal.      Deep Tendon Reflexes: Reflexes normal.   Psychiatric:         Mood and Affect: Mood normal.         Behavior: Behavior normal.         Thought Content: Thought content normal.         Judgment: Judgment normal.       Neurological Examination   On neurological examination patient is alert, awake, oriented and in no distress. Speech is fluent without dysarthria or aphasia. Cranial nerves 2-12 were symmetrically intact bilaterally. No evidence of any focal weakness or sensory loss in the upper or lower extremities. Motor testing reveals 5/5 strength of the bilateral upper and lower extremities.There was no pronator drift.  No fasciculations present. No abnormal involuntary movements. Finger- to-nose reveals no tremor or ataxia and intact proprioceptive function, no dysmetria was noted. Rapid alternating movement normal. Sensation was intact to vibration, light touch, and temperature in bilateral upper and lower extremities. Deep tendon reflexes were 2+ and symmetric in the bilateral upper and lower extremities. She is able to rise easily without assistance from a seated position. Casual gait is normal including stance, stride, and arm swing. Normal tandem gait. Romberg is absent.        ROS:    Review of Systems  Constitutional:  Negative for appetite change, fatigue and fever.   HENT: Negative.  Negative for hearing loss, tinnitus, trouble swallowing and voice change.    Eyes:  Positive for visual disturbance (double vision in mainly right eye). Negative for  photophobia and pain.   Respiratory: Negative.  Negative for shortness of breath.    Cardiovascular: Negative.  Negative for palpitations.   Gastrointestinal:  Positive for nausea (onset of migraines) and vomiting (onset of migraines).   Endocrine: Negative.  Negative for cold intolerance.   Genitourinary: Negative.  Negative for dysuria, frequency and urgency.   Musculoskeletal:  Negative for back pain, gait problem, myalgias, neck pain and neck stiffness.   Skin: Negative.  Negative for rash.   Allergic/Immunologic: Negative.    Neurological:  Positive for headaches (everyday). Negative for dizziness, tremors, seizures, syncope, facial asymmetry, speech difficulty, weakness, light-headedness and numbness.   Hematological: Negative.  Does not bruise/bleed easily.   Psychiatric/Behavioral: Negative.  Negative for confusion, hallucinations and sleep disturbance.    All other systems reviewed and are negative.    Reviewed ROS as entered by medical assistant.

## 2024-05-01 NOTE — PROGRESS NOTES
Patient ID: Brittani Patino is a 39 y.o. female.    Assessment/Plan:    No problem-specific Assessment & Plan notes found for this encounter.       {Assess/PlanSmartLinks:42450}       Subjective:    HPI    {[facility] HPI texts:84451}    {Common ambulatory SmartLinks:56624}         Objective:    There were no vitals taken for this visit.    Physical Exam    Neurological Exam      ROS:    Review of Systems   Constitutional:  Negative for appetite change, fatigue and fever.   HENT: Negative.  Negative for hearing loss, tinnitus, trouble swallowing and voice change.    Eyes:  Positive for visual disturbance (double vision in mainly right eye). Negative for photophobia and pain.   Respiratory: Negative.  Negative for shortness of breath.    Cardiovascular: Negative.  Negative for palpitations.   Gastrointestinal:  Positive for nausea (onset of migraines) and vomiting (onset of migraines).   Endocrine: Negative.  Negative for cold intolerance.   Genitourinary: Negative.  Negative for dysuria, frequency and urgency.   Musculoskeletal:  Negative for back pain, gait problem, myalgias, neck pain and neck stiffness.   Skin: Negative.  Negative for rash.   Allergic/Immunologic: Negative.    Neurological:  Positive for headaches (everyday). Negative for dizziness, tremors, seizures, syncope, facial asymmetry, speech difficulty, weakness, light-headedness and numbness.   Hematological: Negative.  Does not bruise/bleed easily.   Psychiatric/Behavioral: Negative.  Negative for confusion, hallucinations and sleep disturbance.    All other systems reviewed and are negative.

## 2024-05-01 NOTE — PATIENT INSTRUCTIONS
Headache/migraine treatment:   - When you have a moderate to severe headache, you should seek rest, initiate relaxation and apply cold compresses to the head.      Abortive medications (for immediate treatment of a headache):   It is ok to take ibuprofen, acetaminophen or naproxen (Advil, Tylenol,  Aleve, Excedrin) if they help your headaches you should limit these to no more than 3 times a week to avoid medication overuse/rebound headaches.     Take at the onset of a migraine use Sumatriptan SC 6 mg +/- zofran 4 mg +/- Tylenol 1000 mg  You may repeat sumatriptan 6 mg in 1 hour if needed. Max 12 mg/day, Max 3 days per week.       Over the counter preventive supplements for headaches/migraines   (to take every day to help prevent headaches - not to take at the time of headache):  [x] Magnesium 500 mg 1-2 times daily (If any diarrhea or upset stomach, decrease dose  as tolerated)  [x] Riboflavin (Vitamin B2) 400mg daily (FYI B2 may make your urine bright/neon yellow)     Prescription preventive medications for headaches/migraines   (to take every day to help prevent headaches - not to take at the time of headache):    Increase Amitriptyline 20 mg to 30 mg x 2 weeks, then 40 mg x 2 weeks, then 50 mg thereafter; stopping at the lowest effective dose  Send me a Quintic message once you have been on 50 mg at least 2 weeks and we can consider increasing further if needed      *Typically these types of medications take time untill you see the benefit, although some may see improvement in days, often it may take weeks, especially if the medication is being titrated up to a beneficial level. Please contact us if there are any concerns or questions regarding the medication.      Self-Monitoring:  [x] Headache calendar. Each day donna a number from 0-10 indicating if there was a headache and how bad it was.  This can be used to monitor gradual improvement and is helpful to make medication adjustments. You can do this on paper or  "there is an ANNY for a smart phone called \"Migraine e Diary\".      Lifestyle Recommendations:  [x] SLEEP - Maintain a regular sleep schedule: Adults need at least 7-8 hours of uninterrupted a night. Maintain good sleep hygiene:  Going to bed and waking up at consistent times, avoiding excessive daytime naps, avoiding caffeinated beverages in the evening, avoid excessive stimulation in the evening and generally using bed primarily for sleeping.  One hour before bedtime would recommend turning lights down lower, decreasing your activity (may read quietly, listen to music at a low volume). When you get into bed, should eliminate all technology (no texting, emailing, playing with your phone, iPad or tablet in bed).  [x] HYDRATION - Maintain good hydration.  Drink  2L of fluid a day (4 typical small water bottles)  [x] DIET - Maintain good nutrition. In particular don't skip meals and try and eat healthy balanced meals regularly.  [x] TRIGGERS - Look for other triggers and avoid them: Limit caffeine to 1-2 cups a day or less. Avoid dietary triggers that you have noticed bring on your headaches (this could include aged cheese, peanuts, MSG, aspartame and nitrates).  [x] EXERCISE - physical exercise as we all know is good for you in many ways, and not only is good for your heart, but also is beneficial for your mental health, cognitive health and  chronic pain/headaches. I would encourage at the least 5 days of physical exercise weekly for at least 30 minutes.      Education and Follow-up  [x] Please call with any questions or concerns. Of course if any new concerning symptoms go to the emergency department.  [x] Follow up in 3 months, sooner if needed         "

## 2024-05-10 ENCOUNTER — TELEPHONE (OUTPATIENT)
Dept: NEPHROLOGY | Facility: CLINIC | Age: 39
End: 2024-05-10

## 2024-05-10 NOTE — TELEPHONE ENCOUNTER
lvm for patient to call the office to schedule appt in July in [facility]. Patient can see ARIADNA or PA

## 2024-05-20 ENCOUNTER — APPOINTMENT (OUTPATIENT)
Dept: LAB | Facility: HOSPITAL | Age: 39
End: 2024-05-20
Payer: COMMERCIAL

## 2024-05-20 DIAGNOSIS — E87.6 HYPOKALEMIA: ICD-10-CM

## 2024-05-20 LAB
ANION GAP SERPL CALCULATED.3IONS-SCNC: 6 MMOL/L (ref 4–13)
BUN SERPL-MCNC: 12 MG/DL (ref 5–25)
CALCIUM SERPL-MCNC: 8.5 MG/DL (ref 8.4–10.2)
CHLORIDE SERPL-SCNC: 109 MMOL/L (ref 96–108)
CO2 SERPL-SCNC: 23 MMOL/L (ref 21–32)
CREAT SERPL-MCNC: 1.08 MG/DL (ref 0.6–1.3)
GFR SERPL CREATININE-BSD FRML MDRD: 64 ML/MIN/1.73SQ M
GLUCOSE SERPL-MCNC: 80 MG/DL (ref 65–140)
POTASSIUM SERPL-SCNC: 3.7 MMOL/L (ref 3.5–5.3)
SODIUM SERPL-SCNC: 138 MMOL/L (ref 135–147)

## 2024-05-20 PROCEDURE — 36415 COLL VENOUS BLD VENIPUNCTURE: CPT

## 2024-05-20 PROCEDURE — 80048 BASIC METABOLIC PNL TOTAL CA: CPT

## 2024-05-23 ENCOUNTER — TELEPHONE (OUTPATIENT)
Dept: NEPHROLOGY | Facility: CLINIC | Age: 39
End: 2024-05-23

## 2024-05-23 NOTE — TELEPHONE ENCOUNTER
I called and left a VM to please call the office back to discuss the following:    ----- Message from Rafael Chong PA-C sent at 5/22/2024 11:01 AM EDT -----  Labs reviewed and patient's kidney function is stable.  There are no electrolyte abnormalities.

## 2024-05-28 ENCOUNTER — APPOINTMENT (OUTPATIENT)
Dept: LAB | Facility: HOSPITAL | Age: 39
End: 2024-05-28
Payer: COMMERCIAL

## 2024-05-28 ENCOUNTER — TELEPHONE (OUTPATIENT)
Dept: NEPHROLOGY | Facility: CLINIC | Age: 39
End: 2024-05-28

## 2024-05-28 DIAGNOSIS — E87.6 HYPOKALEMIA: ICD-10-CM

## 2024-05-28 LAB
ANION GAP SERPL CALCULATED.3IONS-SCNC: 6 MMOL/L (ref 4–13)
BUN SERPL-MCNC: 14 MG/DL (ref 5–25)
CALCIUM SERPL-MCNC: 8.4 MG/DL (ref 8.4–10.2)
CHLORIDE SERPL-SCNC: 109 MMOL/L (ref 96–108)
CO2 SERPL-SCNC: 24 MMOL/L (ref 21–32)
CREAT SERPL-MCNC: 0.98 MG/DL (ref 0.6–1.3)
GFR SERPL CREATININE-BSD FRML MDRD: 72 ML/MIN/1.73SQ M
GLUCOSE SERPL-MCNC: 85 MG/DL (ref 65–140)
POTASSIUM SERPL-SCNC: 4.2 MMOL/L (ref 3.5–5.3)
SODIUM SERPL-SCNC: 139 MMOL/L (ref 135–147)

## 2024-05-28 PROCEDURE — 36415 COLL VENOUS BLD VENIPUNCTURE: CPT

## 2024-05-28 PROCEDURE — 80048 BASIC METABOLIC PNL TOTAL CA: CPT

## 2024-05-28 NOTE — TELEPHONE ENCOUNTER
I called and left a detailed message of excellent potassium level. If any questions to call office.       ----- Message from Juan Carlos Ricketts, DO sent at 5/28/2024  8:32 AM EDT -----  Potassium levels continue to be excellent, great job.

## 2024-06-04 ENCOUNTER — TELEPHONE (OUTPATIENT)
Dept: NEUROLOGY | Facility: CLINIC | Age: 39
End: 2024-06-04

## 2024-06-04 DIAGNOSIS — R42 VERTIGO: Primary | ICD-10-CM

## 2024-06-04 NOTE — TELEPHONE ENCOUNTER
Recd vm 6/4 10:24 AM  Brittani fernandes.  My phone number is [phone]. My YOB: 1985. The reason for my call is 2 reasons. Number one, I had increased the dosage of my amitriptyline to 50 at night and I'm still getting migraines almost every day. And the 2nd reason I was calling is because since Friday night I had 4 vertigo attacks.  I had one on Friday I had one yesterday and I had two today. So I wasn't sure what to do or how to handle that.

## 2024-06-05 RX ORDER — MECLIZINE HCL 12.5 MG/1
12.5 TABLET ORAL 3 TIMES DAILY PRN
Qty: 30 TABLET | Refills: 0 | Status: SHIPPED | OUTPATIENT
Start: 2024-06-05

## 2024-06-05 NOTE — TELEPHONE ENCOUNTER
Spoke w/pt. Advised her of Kyler's note below. Pt verbalized understanding.     She began amitriptyline 50mg dose on 5/28/24.   She has had a total of 5 episodes of vertigo. Episodes last appx 10 minutes. She was walking out of the house this morning and the sunlight triggered a vertigo episode. She remains still; the room/her environment spins around her.   Pt has not taken meclizine in 4-5 years.She reports it did help her symptoms. Please send rx to [facility] in [place] if agreeable.  Pt has never done vestibular therapy and would like to try it.    Kyler - please place rx for meclizine if agreeable and referral for VT. Thank you!

## 2024-06-05 NOTE — TELEPHONE ENCOUNTER
Please clarify how long she has been on 50 mg. She was seen 5/1 and advised to take 30 mg x 2 weeks, 40 mg x 2 weeks, then 50 mg and send me a PlaySight message with an updated in 2 weeks. It's only been 5 weeks since she was seen, so I'm thinking she may need to give more time on amitriptyline 50 mg to see if effective. Regarding the recent vertigo episodes- how long are episodes lasting? Has she taken meclizine? Has this helped? She can consider vestibular therapy which may help. Let me know and I will place the order.

## 2024-06-05 NOTE — TELEPHONE ENCOUNTER
Continue Amitriptyline  Meclizine up to tid prn, but should not be used daily  Order for vestibular therapy; would be helpful if she started this ASAP while she is still symptomatic

## 2024-06-05 NOTE — TELEPHONE ENCOUNTER
Spoke w/pt. Advised her of note below. Pt verbalized understanding to all.     Provided pt w/[facility] contact info [phone].

## 2024-06-05 NOTE — TELEPHONE ENCOUNTER
6/5 9:19    Pt left a VM re: vertigo attacks/migraines.    Transcribed VM:   Good morning, my name is Brittani Patino. My phone number is [phone]. My YOB: 1985. I'm calling back again because I've been having a lot of vertigo attacks and migraines and I'm not sure what to do at this point. So if somebody could please call me back, I would really appreciate it. Again, Brittani Patino, 1985 is my birthday. My phone number is [phone]. Thank you.

## 2024-06-11 ENCOUNTER — TELEPHONE (OUTPATIENT)
Dept: NEPHROLOGY | Facility: CLINIC | Age: 39
End: 2024-06-11

## 2024-06-11 ENCOUNTER — APPOINTMENT (OUTPATIENT)
Dept: LAB | Facility: HOSPITAL | Age: 39
End: 2024-06-11
Payer: COMMERCIAL

## 2024-06-11 DIAGNOSIS — E87.6 HYPOKALEMIA: ICD-10-CM

## 2024-06-11 LAB
ANION GAP SERPL CALCULATED.3IONS-SCNC: 8 MMOL/L (ref 4–13)
BUN SERPL-MCNC: 14 MG/DL (ref 5–25)
CALCIUM SERPL-MCNC: 8.7 MG/DL (ref 8.4–10.2)
CHLORIDE SERPL-SCNC: 103 MMOL/L (ref 96–108)
CO2 SERPL-SCNC: 24 MMOL/L (ref 21–32)
CREAT SERPL-MCNC: 1.11 MG/DL (ref 0.6–1.3)
GFR SERPL CREATININE-BSD FRML MDRD: 62 ML/MIN/1.73SQ M
GLUCOSE P FAST SERPL-MCNC: 107 MG/DL (ref 65–99)
POTASSIUM SERPL-SCNC: 3.3 MMOL/L (ref 3.5–5.3)
SODIUM SERPL-SCNC: 135 MMOL/L (ref 135–147)

## 2024-06-11 PROCEDURE — 36415 COLL VENOUS BLD VENIPUNCTURE: CPT

## 2024-06-11 PROCEDURE — 80048 BASIC METABOLIC PNL TOTAL CA: CPT

## 2024-06-11 NOTE — TELEPHONE ENCOUNTER
I called and left a message for patient to call back to obtain results.     ----- Message from Juan Carlos Ricketts DO sent at 6/11/2024 10:56 AM EDT -----  Potassium level slightly reduced, continue with current care.  Please ask if the patient is doing well in general and is able to get her infusions at home appropriately, or have there been any issues

## 2024-06-11 NOTE — TELEPHONE ENCOUNTER
Phone call from patient returning call to discuss lab results - transferred to [facility] for further discussion.

## 2024-06-11 NOTE — TELEPHONE ENCOUNTER
Patient was updated on lab results. She said she has been getting her infusions, and has not missed any. She said the only thing new is she is getting migraines, but is seeing [facility] neurology for this.

## 2024-06-14 ENCOUNTER — PATIENT MESSAGE (OUTPATIENT)
Dept: NEUROLOGY | Facility: CLINIC | Age: 39
End: 2024-06-14

## 2024-06-14 DIAGNOSIS — Z51.81 MEDICATION MONITORING ENCOUNTER: Primary | ICD-10-CM

## 2024-06-17 ENCOUNTER — TELEPHONE (OUTPATIENT)
Dept: NEPHROLOGY | Facility: CLINIC | Age: 39
End: 2024-06-17

## 2024-06-17 ENCOUNTER — APPOINTMENT (OUTPATIENT)
Dept: LAB | Facility: HOSPITAL | Age: 39
End: 2024-06-17
Payer: COMMERCIAL

## 2024-06-17 DIAGNOSIS — E87.6 HYPOKALEMIA: ICD-10-CM

## 2024-06-17 LAB
ANION GAP SERPL CALCULATED.3IONS-SCNC: 6 MMOL/L (ref 4–13)
BUN SERPL-MCNC: 10 MG/DL (ref 5–25)
CALCIUM SERPL-MCNC: 8.6 MG/DL (ref 8.4–10.2)
CHLORIDE SERPL-SCNC: 106 MMOL/L (ref 96–108)
CO2 SERPL-SCNC: 24 MMOL/L (ref 21–32)
CREAT SERPL-MCNC: 1.05 MG/DL (ref 0.6–1.3)
GFR SERPL CREATININE-BSD FRML MDRD: 66 ML/MIN/1.73SQ M
GLUCOSE SERPL-MCNC: 88 MG/DL (ref 65–140)
POTASSIUM SERPL-SCNC: 3.8 MMOL/L (ref 3.5–5.3)
SODIUM SERPL-SCNC: 136 MMOL/L (ref 135–147)

## 2024-06-17 PROCEDURE — 36415 COLL VENOUS BLD VENIPUNCTURE: CPT

## 2024-06-17 PROCEDURE — 80048 BASIC METABOLIC PNL TOTAL CA: CPT

## 2024-06-17 NOTE — PATIENT COMMUNICATION
Recd vm 6/17 1:02 PM  Brittani fernandes.  My phone number is [phone]. The reason for my call is I have been increasing my dose of amitriptyline  every 2 weeks and I was calling to find out now that I hit 50  what you guys wanted me to increase it to.  I sent an email asking about it on Friday, but I haven't heard back. So I just wanted to check on if somebody could give me a call back, that would be great. [phone].

## 2024-06-17 NOTE — TELEPHONE ENCOUNTER
----- Message from ARIADNA John sent at 6/17/2024  9:08 AM EDT -----  Please advise patient her recent lab work is stable with a creatinine of 1.05 mg per DL and GFR 66 mL/min.  Her electrolytes are also stable, including potassium 3.8 mmol/L.  Please ask how she has been feeling and if she is able to continue getting her home infusions without any issues.  Thank you.

## 2024-06-19 DIAGNOSIS — Z00.6 ENCOUNTER FOR EXAMINATION FOR NORMAL COMPARISON OR CONTROL IN CLINICAL RESEARCH PROGRAM: ICD-10-CM

## 2024-06-19 NOTE — TELEPHONE ENCOUNTER
Tried reaching patient, no answer.  Left message on voicemail for patient to return call.    Letter sent to patients My Chart.

## 2024-06-21 ENCOUNTER — APPOINTMENT (OUTPATIENT)
Dept: LAB | Facility: CLINIC | Age: 39
End: 2024-06-21

## 2024-06-21 DIAGNOSIS — Z00.6 ENCOUNTER FOR EXAMINATION FOR NORMAL COMPARISON OR CONTROL IN CLINICAL RESEARCH PROGRAM: ICD-10-CM

## 2024-06-21 PROCEDURE — 36415 COLL VENOUS BLD VENIPUNCTURE: CPT

## 2024-06-24 ENCOUNTER — APPOINTMENT (OUTPATIENT)
Dept: LAB | Facility: HOSPITAL | Age: 39
End: 2024-06-24
Payer: COMMERCIAL

## 2024-06-24 ENCOUNTER — TELEPHONE (OUTPATIENT)
Dept: NEPHROLOGY | Facility: CLINIC | Age: 39
End: 2024-06-24

## 2024-06-24 DIAGNOSIS — E87.6 HYPOKALEMIA: ICD-10-CM

## 2024-06-24 LAB
ANION GAP SERPL CALCULATED.3IONS-SCNC: 6 MMOL/L (ref 4–13)
BUN SERPL-MCNC: 12 MG/DL (ref 5–25)
CALCIUM SERPL-MCNC: 8.6 MG/DL (ref 8.4–10.2)
CHLORIDE SERPL-SCNC: 110 MMOL/L (ref 96–108)
CO2 SERPL-SCNC: 21 MMOL/L (ref 21–32)
CREAT SERPL-MCNC: 0.99 MG/DL (ref 0.6–1.3)
GFR SERPL CREATININE-BSD FRML MDRD: 71 ML/MIN/1.73SQ M
GLUCOSE SERPL-MCNC: 83 MG/DL (ref 65–140)
POTASSIUM SERPL-SCNC: 4.1 MMOL/L (ref 3.5–5.3)
SODIUM SERPL-SCNC: 137 MMOL/L (ref 135–147)

## 2024-06-24 PROCEDURE — 80048 BASIC METABOLIC PNL TOTAL CA: CPT

## 2024-06-24 PROCEDURE — 36415 COLL VENOUS BLD VENIPUNCTURE: CPT

## 2024-06-24 NOTE — TELEPHONE ENCOUNTER
----- Message from Juan Carlos Ricketts DO sent at 6/24/2024  9:14 AM EDT -----  Labs look good, continue with current care

## 2024-06-29 DIAGNOSIS — G43.709 CHRONIC MIGRAINE WITHOUT AURA WITHOUT STATUS MIGRAINOSUS, NOT INTRACTABLE: ICD-10-CM

## 2024-07-01 ENCOUNTER — APPOINTMENT (OUTPATIENT)
Dept: LAB | Facility: HOSPITAL | Age: 39
End: 2024-07-01
Payer: COMMERCIAL

## 2024-07-01 DIAGNOSIS — E87.6 HYPOKALEMIA: ICD-10-CM

## 2024-07-01 LAB
ANION GAP SERPL CALCULATED.3IONS-SCNC: 6 MMOL/L (ref 4–13)
BUN SERPL-MCNC: 10 MG/DL (ref 5–25)
CALCIUM SERPL-MCNC: 8.7 MG/DL (ref 8.4–10.2)
CHLORIDE SERPL-SCNC: 106 MMOL/L (ref 96–108)
CO2 SERPL-SCNC: 25 MMOL/L (ref 21–32)
CREAT SERPL-MCNC: 0.84 MG/DL (ref 0.6–1.3)
GFR SERPL CREATININE-BSD FRML MDRD: 87 ML/MIN/1.73SQ M
GLUCOSE SERPL-MCNC: 88 MG/DL (ref 65–140)
POTASSIUM SERPL-SCNC: 3.8 MMOL/L (ref 3.5–5.3)
SODIUM SERPL-SCNC: 137 MMOL/L (ref 135–147)

## 2024-07-01 PROCEDURE — 80048 BASIC METABOLIC PNL TOTAL CA: CPT

## 2024-07-01 PROCEDURE — 36415 COLL VENOUS BLD VENIPUNCTURE: CPT

## 2024-07-02 RX ORDER — AMITRIPTYLINE HYDROCHLORIDE 50 MG/1
50 TABLET, FILM COATED ORAL
Qty: 90 TABLET | Refills: 0 | Status: SHIPPED | OUTPATIENT
Start: 2024-07-02

## 2024-07-02 NOTE — TELEPHONE ENCOUNTER
Pt is requesting refill of amitriptyline 10mg. Per recent patient message encounter, pt is to continue on amitriptyline 50mg daily. Pt to have EKG to determine possible dose increase- not yet complete.     LOV 5/1/24 with Kyler.    Amitriptyline Rx pended for 50mg dose (using 50mg tabs). Please review and sign if agreeable.

## 2024-07-08 ENCOUNTER — TELEPHONE (OUTPATIENT)
Dept: NEPHROLOGY | Facility: CLINIC | Age: 39
End: 2024-07-08

## 2024-07-08 ENCOUNTER — APPOINTMENT (OUTPATIENT)
Dept: LAB | Facility: HOSPITAL | Age: 39
End: 2024-07-08
Payer: COMMERCIAL

## 2024-07-08 DIAGNOSIS — E87.6 HYPOKALEMIA: ICD-10-CM

## 2024-07-08 LAB
ANION GAP SERPL CALCULATED.3IONS-SCNC: 8 MMOL/L (ref 4–13)
BUN SERPL-MCNC: 11 MG/DL (ref 5–25)
CALCIUM SERPL-MCNC: 8.5 MG/DL (ref 8.4–10.2)
CHLORIDE SERPL-SCNC: 107 MMOL/L (ref 96–108)
CO2 SERPL-SCNC: 22 MMOL/L (ref 21–32)
CREAT SERPL-MCNC: 0.94 MG/DL (ref 0.6–1.3)
GFR SERPL CREATININE-BSD FRML MDRD: 76 ML/MIN/1.73SQ M
GLUCOSE SERPL-MCNC: 77 MG/DL (ref 65–140)
POTASSIUM SERPL-SCNC: 4.1 MMOL/L (ref 3.5–5.3)
SODIUM SERPL-SCNC: 137 MMOL/L (ref 135–147)

## 2024-07-08 PROCEDURE — 80048 BASIC METABOLIC PNL TOTAL CA: CPT

## 2024-07-08 PROCEDURE — 36415 COLL VENOUS BLD VENIPUNCTURE: CPT

## 2024-07-08 NOTE — TELEPHONE ENCOUNTER
----- Message from ARIADNA John sent at 7/8/2024  2:04 PM EDT -----  Please advise patient her kidney function is stable with a creatinine of 0.94 mg/dL and GFR of 76 mL/min.  Electrolytes are within normal limits including potassium of 4.1 mmol/L.  She should continue with current care as ordered by Dr. Ricketts.

## 2024-07-10 ENCOUNTER — TELEPHONE (OUTPATIENT)
Dept: NEPHROLOGY | Facility: CLINIC | Age: 39
End: 2024-07-10

## 2024-07-15 ENCOUNTER — TELEPHONE (OUTPATIENT)
Dept: NEPHROLOGY | Facility: CLINIC | Age: 39
End: 2024-07-15

## 2024-07-15 ENCOUNTER — APPOINTMENT (OUTPATIENT)
Dept: LAB | Facility: HOSPITAL | Age: 39
End: 2024-07-15
Payer: COMMERCIAL

## 2024-07-15 DIAGNOSIS — E87.6 HYPOKALEMIA: ICD-10-CM

## 2024-07-15 LAB
ANION GAP SERPL CALCULATED.3IONS-SCNC: 5 MMOL/L (ref 4–13)
BUN SERPL-MCNC: 14 MG/DL (ref 5–25)
CALCIUM SERPL-MCNC: 8.6 MG/DL (ref 8.4–10.2)
CHLORIDE SERPL-SCNC: 110 MMOL/L (ref 96–108)
CO2 SERPL-SCNC: 23 MMOL/L (ref 21–32)
CREAT SERPL-MCNC: 0.97 MG/DL (ref 0.6–1.3)
GFR SERPL CREATININE-BSD FRML MDRD: 73 ML/MIN/1.73SQ M
GLUCOSE SERPL-MCNC: 88 MG/DL (ref 65–140)
POTASSIUM SERPL-SCNC: 4.3 MMOL/L (ref 3.5–5.3)
SODIUM SERPL-SCNC: 138 MMOL/L (ref 135–147)

## 2024-07-15 PROCEDURE — 80048 BASIC METABOLIC PNL TOTAL CA: CPT

## 2024-07-15 PROCEDURE — 36415 COLL VENOUS BLD VENIPUNCTURE: CPT

## 2024-07-15 NOTE — TELEPHONE ENCOUNTER
I called and left patient a detailed message of Potassium level is excellent, continue with current care. If any questions call office.       ----- Message from Juan Carlos Ricketts DO sent at 7/15/2024  1:15 PM EDT -----  Potassium level is excellent, continue with current care   Former smoker

## 2024-07-18 NOTE — INTERVAL H&P NOTE
"Patient Active Problem List   Diagnosis    Pressure ulcer of ischium, left, stage IV (H)     Past Medical History:   Diagnosis Date    Paraplegia (H)     Spinal cord injury at T9 level (H)      Labs: No results for input(s): \"ALBUMIN\", \"GLUCOSE\", \"HGB\", \"INR\", \"WBC\", \"A1C\", \"CRP\" in the last 55275 hours.    Invalid input(s): \"PREALBUMIN\", \"MICROBIO\"  Nutrition requirements were discussed with patient today.  Vitals:  /47 (BP Location: Left arm, Patient Position: Supine)   Pulse 86   Temp 96.9  F (36.1  C) (Temporal)   Wound:   Wound (used by OP [facility] only) 01/23/24 1248 Left ischial tuberosity pressure injury (Active)   Thickness/Stage Stage 4 07/18/24 0945   Base granulating;slough 07/18/24 0945   Periwound intact 07/18/24 0945   Periwound Temperature warm 07/18/24 0945   Periwound Skin Turgor soft 07/18/24 0945   Edges open 07/18/24 0945   Length (cm) 2.5 07/18/24 0945   Width (cm) 1.8 07/18/24 0945   Depth (cm) 1.6 07/18/24 0945   Wound (cm^2) 4.5 cm^2 07/18/24 0945   Wound Volume (cm^3) 7.2 cm^3 07/18/24 0945   Wound healing % -7400 07/18/24 0945   Tunneling [Depth (cm)/Location] 2 o'clock / 3.8 cm 02/01/24 1000   Undermining [Depth (cm)/Location] 9-1o'/ 4cm 07/18/24 0945   Drainage Characteristics/Odor serosanguineous 07/18/24 0945   Drainage Amount large 07/18/24 0945   Care, Wound debrided 07/18/24 0945       Incision/Surgical Site 05/20/24 Left Ischial tuberosity (Active)      Photo:         Further instructions from your care team         07/18/2024   [facility] [place]   2003  A DME order for supplies has been placed to [employer] Ph: [phone] Fax: [phone]  [employer] : Ph: [phone]   Fax: [phone]  [facility] : Fax: [phone]     Plan: 7/18/24  [employer] account # [phone]  Dressing changes outside of clinic are being performed by Father  Blood work drawn today  Will consider NPWT VAC therapy and FLAP surgery at next visit   Done:  Bone " H&P reviewed  After examining the patient I find no changes in the patients condition since the H&P had been written  "Biopsy negative for bone infection  1. Currently using memory foam and egg crate mattress; Group 2 mattress not needed   2. Done: 9/23 Roho Wheelchair Cushion  3. Done: 12/23 MRI  4.Your nutrition values must be with in normal range: continue a diet high in protein for wound healing, we recommend getting  grams of protein per day; proteinshakes or bars are a good way to get extra protein in your diet.  5. Straight Catheterizes every 4-6 hours  6. Bowel program done every morning     Wound Dressing Change: Left Ischial Tuberosity  - Cleanse with mild unscented soap (such as Cetaphil, Cerave or Dove) and water  - Apply small amount of VASHE on gauze, to wound bed, for 10 minutes, remove gauze; pat dry  - Cut 1 4x4 3/8\" Fibracol and tuck into undermine and wound base  Tuck 1/15th piece of dry AMD 4\" roll gauze into undermine area to push collagen into wound  - Cover with 1 5x9 ABD pad  - Secure with 2\" medipore tape  Change 2 times a day and as needed for soilage     Repositioning:  Bed: Reposition MINIMALLY every 1-2 hours in bed to relieve pressure and promote perfusion to tissue.  Chair: Sit on chair cushion when up to the chair, do not sit for longer than one hour total before returning to bed for at least 60 minutes to relieve pressure and promote perfusion to the tissue. Completely recline/tilt for 15 minutes each hour.  Sit on a chair cushion when up to the chair.     Main Provider: Esperanza Boothe M.D. July 18, 2024           "

## 2024-07-22 ENCOUNTER — TELEPHONE (OUTPATIENT)
Dept: NEPHROLOGY | Facility: CLINIC | Age: 39
End: 2024-07-22

## 2024-07-22 ENCOUNTER — APPOINTMENT (OUTPATIENT)
Dept: LAB | Facility: HOSPITAL | Age: 39
End: 2024-07-22
Payer: COMMERCIAL

## 2024-07-22 DIAGNOSIS — E87.6 HYPOKALEMIA: ICD-10-CM

## 2024-07-22 LAB
ANION GAP SERPL CALCULATED.3IONS-SCNC: 7 MMOL/L (ref 4–13)
BUN SERPL-MCNC: 16 MG/DL (ref 5–25)
CALCIUM SERPL-MCNC: 8.5 MG/DL (ref 8.4–10.2)
CHLORIDE SERPL-SCNC: 108 MMOL/L (ref 96–108)
CO2 SERPL-SCNC: 21 MMOL/L (ref 21–32)
CREAT SERPL-MCNC: 0.9 MG/DL (ref 0.6–1.3)
GFR SERPL CREATININE-BSD FRML MDRD: 80 ML/MIN/1.73SQ M
GLUCOSE SERPL-MCNC: 89 MG/DL (ref 65–140)
POTASSIUM SERPL-SCNC: 3.8 MMOL/L (ref 3.5–5.3)
SODIUM SERPL-SCNC: 136 MMOL/L (ref 135–147)

## 2024-07-22 PROCEDURE — 36415 COLL VENOUS BLD VENIPUNCTURE: CPT

## 2024-07-22 PROCEDURE — 80048 BASIC METABOLIC PNL TOTAL CA: CPT

## 2024-07-22 NOTE — TELEPHONE ENCOUNTER
Was unable to get in contact with pt phone kept getting sent to . [employer] message was sent to contact office with results.

## 2024-07-23 ENCOUNTER — TELEPHONE (OUTPATIENT)
Age: 39
End: 2024-07-23

## 2024-07-31 ENCOUNTER — OFFICE VISIT (OUTPATIENT)
Age: 39
End: 2024-07-31
Payer: COMMERCIAL

## 2024-07-31 VITALS
DIASTOLIC BLOOD PRESSURE: 60 MMHG | WEIGHT: 115.6 LBS | HEIGHT: 62 IN | BODY MASS INDEX: 21.27 KG/M2 | SYSTOLIC BLOOD PRESSURE: 106 MMHG | OXYGEN SATURATION: 98 % | HEART RATE: 96 BPM

## 2024-07-31 DIAGNOSIS — G43.709 CHRONIC MIGRAINE WITHOUT AURA WITHOUT STATUS MIGRAINOSUS, NOT INTRACTABLE: Primary | ICD-10-CM

## 2024-07-31 PROCEDURE — 99214 OFFICE O/P EST MOD 30 MIN: CPT

## 2024-07-31 RX ORDER — CHOLECALCIFEROL (VITAMIN D3) 125 MCG
10000 CAPSULE ORAL 2 TIMES DAILY
COMMUNITY

## 2024-07-31 RX ORDER — CEFUROXIME AXETIL 250 MG/1
0.5 TABLET ORAL AS NEEDED
Qty: 3 ML | Refills: 3 | Status: SHIPPED | OUTPATIENT
Start: 2024-07-31

## 2024-07-31 RX ORDER — AMOXICILLIN 500 MG/1
CAPSULE ORAL
COMMUNITY
Start: 2024-07-24 | End: 2024-07-31

## 2024-07-31 RX ORDER — CHLORHEXIDINE GLUCONATE ORAL RINSE 1.2 MG/ML
SOLUTION DENTAL
COMMUNITY
Start: 2024-07-24

## 2024-07-31 NOTE — ASSESSMENT & PLAN NOTE
Brittani Patino is a 39 year old female with a hx of migraine headaches since 2016. She reports an improvement in the frequency of her migraines since increasing amitriptyline to 50 mg daily.  We discussed the option of increasing Amitriptyline as higher doses have been effective in the past for her, which she is agreeable. I have asked her to obtain an EKG in 48 hours (will defer today given she just completed antibiotics), and as long as no Qtc prolongation will have her increase amitriptyline to 75 mg daily. I encouraged her to continue with adequate hydration, sleep, and vitamin supplementation. For migraine , she will continue to use sumatriptan SC  6 mg +/- zofran 4 mg +/- Tylenol 1000 mg, no more than 3 times per week. She was advised she may use this in combination with or without Tylenol and Zofran. She may also continue to use meclizine as needed, preferably no more than 3 times per week. She will follow up in 3 months; sooner if needed. Plan as outlined below.    Plan:  Headache/migraine treatment:   - When you have a moderate to severe headache, you should seek rest, initiate relaxation and apply cold compresses to the head.      Abortive medications (for immediate treatment of a headache):   It is ok to take ibuprofen, acetaminophen or naproxen (Advil, Tylenol,  Aleve, Excedrin) if they help your headaches you should limit these to no more than 3 times a week to avoid medication overuse/rebound headaches.      Take at the onset of a migraine use Sumatriptan SC 6 mg +/- zofran 4 mg +/- Tylenol 1000 mg  You may repeat sumatriptan 6 mg in 1 hour if needed. Max 12 mg/day, Max 3 days per week.     Over the counter preventive supplements for headaches/migraines   (to take every day to help prevent headaches - not to take at the time of headache):  [x] Magnesium 500 mg 1-2 times daily (If any diarrhea or upset stomach, decrease dose  as tolerated)  [x] Riboflavin (Vitamin B2) 400mg daily (FYI B2 may make  "your urine bright/neon yellow)     Prescription preventive medications for headaches/migraines   (to take every day to help prevent headaches - not to take at the time of headache):     Continue Amitriptyline 50 mg daily for now; Complete EKG on Friday then will increase to Amitriptyline 75 mg daily.    *Typically these types of medications take time untill you see the benefit, although some may see improvement in days, often it may take weeks, especially if the medication is being titrated up to a beneficial level. Please contact us if there are any concerns or questions regarding the medication.      Self-Monitoring:  [x] Headache calendar. Each day donna a number from 0-10 indicating if there was a headache and how bad it was.  This can be used to monitor gradual improvement and is helpful to make medication adjustments. You can do this on paper or there is an ANNY for a smart phone called \"Migraine e Diary\".      Lifestyle Recommendations:  [x] SLEEP - Maintain a regular sleep schedule: Adults need at least 7-8 hours of uninterrupted a night. Maintain good sleep hygiene:  Going to bed and waking up at consistent times, avoiding excessive daytime naps, avoiding caffeinated beverages in the evening, avoid excessive stimulation in the evening and generally using bed primarily for sleeping.  One hour before bedtime would recommend turning lights down lower, decreasing your activity (may read quietly, listen to music at a low volume). When you get into bed, should eliminate all technology (no texting, emailing, playing with your phone, iPad or tablet in bed).  [x] HYDRATION - Maintain good hydration.  Drink  2L of fluid a day (4 typical small water bottles)  [x] DIET - Maintain good nutrition. In particular don't skip meals and try and eat healthy balanced meals regularly.  [x] TRIGGERS - Look for other triggers and avoid them: Limit caffeine to 1-2 cups a day or less. Avoid dietary triggers that you have noticed bring " on your headaches (this could include aged cheese, peanuts, MSG, aspartame and nitrates).  [x] EXERCISE - physical exercise as we all know is good for you in many ways, and not only is good for your heart, but also is beneficial for your mental health, cognitive health and  chronic pain/headaches. I would encourage at the least 5 days of physical exercise weekly for at least 30 minutes.      Education and Follow-up  [x] Please call with any questions or concerns. Of course if any new concerning symptoms go to the emergency department.  [x] Follow up in 3 months, sooner if needed

## 2024-07-31 NOTE — PROGRESS NOTES
Patient ID: Brittani Patino is a 39 y.o. female.    Assessment/Plan:    Chronic migraine without aura without status migrainosus, not intractable  Brittani Patino is a 39 year old female with a hx of migraine headaches since 2016. She reports an improvement in the frequency of her migraines since increasing amitriptyline to 50 mg daily.  We discussed the option of increasing Amitriptyline as higher doses have been effective in the past for her, which she is agreeable. I have asked her to obtain an EKG in 48 hours (will defer today given she just completed antibiotics), and as long as no Qtc prolongation will have her increase amitriptyline to 75 mg daily. I encouraged her to continue with adequate hydration, sleep, and vitamin supplementation. For migraine , she will continue to use sumatriptan SC  6 mg +/- zofran 4 mg +/- Tylenol 1000 mg, no more than 3 times per week. She was advised she may use this in combination with or without Tylenol and Zofran. She may also continue to use meclizine as needed, preferably no more than 3 times per week. She will follow up in 3 months; sooner if needed. Plan as outlined below.    Plan:  Headache/migraine treatment:   - When you have a moderate to severe headache, you should seek rest, initiate relaxation and apply cold compresses to the head.      Abortive medications (for immediate treatment of a headache):   It is ok to take ibuprofen, acetaminophen or naproxen (Advil, Tylenol,  Aleve, Excedrin) if they help your headaches you should limit these to no more than 3 times a week to avoid medication overuse/rebound headaches.      Take at the onset of a migraine use Sumatriptan SC 6 mg +/- zofran 4 mg +/- Tylenol 1000 mg  You may repeat sumatriptan 6 mg in 1 hour if needed. Max 12 mg/day, Max 3 days per week.     Over the counter preventive supplements for headaches/migraines   (to take every day to help prevent headaches - not to take at the time of headache):  [x] Magnesium  "500 mg 1-2 times daily (If any diarrhea or upset stomach, decrease dose  as tolerated)  [x] Riboflavin (Vitamin B2) 400mg daily (FYI B2 may make your urine bright/neon yellow)     Prescription preventive medications for headaches/migraines   (to take every day to help prevent headaches - not to take at the time of headache):     Continue Amitriptyline 50 mg daily for now; Complete EKG on Friday then will increase to Amitriptyline 75 mg daily.    *Typically these types of medications take time untill you see the benefit, although some may see improvement in days, often it may take weeks, especially if the medication is being titrated up to a beneficial level. Please contact us if there are any concerns or questions regarding the medication.      Self-Monitoring:  [x] Headache calendar. Each day donna a number from 0-10 indicating if there was a headache and how bad it was.  This can be used to monitor gradual improvement and is helpful to make medication adjustments. You can do this on paper or there is an ANNY for a smart phone called \"Migraine e Diary\".      Lifestyle Recommendations:  [x] SLEEP - Maintain a regular sleep schedule: Adults need at least 7-8 hours of uninterrupted a night. Maintain good sleep hygiene:  Going to bed and waking up at consistent times, avoiding excessive daytime naps, avoiding caffeinated beverages in the evening, avoid excessive stimulation in the evening and generally using bed primarily for sleeping.  One hour before bedtime would recommend turning lights down lower, decreasing your activity (may read quietly, listen to music at a low volume). When you get into bed, should eliminate all technology (no texting, emailing, playing with your phone, iPad or tablet in bed).  [x] HYDRATION - Maintain good hydration.  Drink  2L of fluid a day (4 typical small water bottles)  [x] DIET - Maintain good nutrition. In particular don't skip meals and try and eat healthy balanced meals " regularly.  [x] TRIGGERS - Look for other triggers and avoid them: Limit caffeine to 1-2 cups a day or less. Avoid dietary triggers that you have noticed bring on your headaches (this could include aged cheese, peanuts, MSG, aspartame and nitrates).  [x] EXERCISE - physical exercise as we all know is good for you in many ways, and not only is good for your heart, but also is beneficial for your mental health, cognitive health and  chronic pain/headaches. I would encourage at the least 5 days of physical exercise weekly for at least 30 minutes.      Education and Follow-up  [x] Please call with any questions or concerns. Of course if any new concerning symptoms go to the emergency department.  [x] Follow up in 3 months, sooner if needed       Diagnoses and all orders for this visit:    Chronic migraine without aura without status migrainosus, not intractable  -     SUMAtriptan Succinate 6 MG/0.5ML SOAJ; Inject 0.5 mL (6 mg total) under the skin as needed (migraine) May repeat once in 1 hour if needed. Max 12 mg/day, Max 3 days per week         I have spent a total time of 30 minutes in caring for this patient on the day of the visit/encounter including Prognosis, Risks and benefits of tx options, Instructions for management, Patient and family education, Importance of tx compliance, Risk factor reductions, Impressions, Documenting in the medical record, Reviewing / ordering tests, medicine, procedures  , and Obtaining or reviewing history  .      Subjective:    LUIS FELIPE Patino is a 39 year old female who presents to the office to follow up on her chronic migraine headaches. She was last seen in consultation 5/1/24. Since she was last seen she did increase her amitriptyline to 50 mg daily. She reports an improvement in the frequency of her migraines to 3-4 times per week, prior was daily. She was ordered to complete a EKG for consideration of higher dosing, but has not yet completed this due to driving restriction  while she was recovering from a hiking injury. She is using sumatriptan SC 6 mg +/- zofran 4 mg +/- Tylenol 1000 mg on average about twice per week. She states she is using meclizine 12.5 mg 1 tablet 2-3 times a week for vertigo. She did not complete vestibular therapy as she has recently been treated for a stress fracture and was not driving. She is taking magnesium and riboflavin daily. She did just finish amoxicillin s/p tooth extraction. Last dose was yesterday. She remains on daily IV potassium replacement managed by nephrology.     She denies any new medical conditions, medications or changes in her migraines.     Prior HPI 5/1/24:    Previously followed by [facility], last seen 3/2/2020. At that time she was maintained on:  Topamax 100 mg bid  Amitriptyline 50 mg at bedtime (up to 100 mg in the past)  Sumatriptan 100 mg PO as needed  Meclizine 25 mg as needed for dizziness/head spinning sensation  Zofran as needed for nausea  Had tried 1 dose of Botox in the past     She returns today and states s/p head injury in 2016 while training for [email or web address] where she describes being kneed in the temple, her migraines have been worse and has vertigo attacks. She states she had been having migraine three times per week, but in December 2023 she had a severe vertigo attack that triggered a migraine and since then she is dealing with daily migraines that cause severe bilateral blurred and double vision (only present with a migraine).  She sees Ophthalmology routinely, last seen 1 year ago. She describes her migraines in more detail below.         Description of Headaches:  Location of pain: right-sided unilateral, temporal  Radiation of pain?:right-sided unilateral, temporal, parietal, frontal, anterior neck  Character of pain:throbbing  Severity of pain: 6-7/10  Accompanying symptoms:nausea, vomiting, sonophobia, photophobia, photopsia, diplopia, mental status changes- cloudiness that causes difficulty with  concentration, blurred vision   Prodromal sx?: denies aura  Rapidity of onset: sudden  Typical duration of individual headache: 24 hours   Frequency of headaches: every day   Are you ever headache free? Usually only headache free with sleep   Are most headaches similar in presentation? yes  Exacerbating factors:none  Typical precipitants: None identified      Temporal Pattern of Headaches:  Started having HA's: 2016 s/p head injury; denies headaches or migraines prior to this   Worst time of day: none   Awaken from sleep?: no  Seasonal pattern?: yes - spring/fall   'Clustering' of HA's over time? no  Overall pattern since problem began: stable unchanged since December 2023     Degree of Functional Impairment: moderate; depends on the day. Severe migraines cause her to lay down on the couch in a dark room. Other days she can function.      Current Use of Meds to Treat HA:  Abortive meds? Does not currently take any medications because nothing helps.  Daily use? no  Preventive meds?  Amitriptyline 20 mg  Non-Medical/Alternative treatments for HA: yes - acupuncture, Accupressure, daithe piercing      Additional Relevant History:  History of head/neck trauma? yes - s/p head injury in 2016 while training for [employer] where she describes being kneed in the temple  History of head/neck surgery? no  Family h/o headache problems?yes - none  Family history of aneurysms? no  Exposure to carbon monoxide? no  Substance use: alcohol: none, illicit drugs: none, tobacco: none, caffeine: none  Multivitamin daily, Vitamin K, A, D, and B12, Potassium, Iron, or melatonin as needed   Water: 16 oz x 4 daily  Sleep: close to 8 hours; no concerns with sleep  Diet: clean diet, several meals throughout the day (3 meals 3 snacks)  S/p Hysterectomy; no plans for pregnancy   She is a stay at home mom   2 children (11 and 15) and       Prior Evaluation/Treatments:  Have you seen another physician for your headaches? yes - [facility]  "neurology   Prior work-up: MRI brain, MRA head, CT head- all unremarkable   Trigger point injections? no  Botox injections? yes - once (vomiting x 2 days and worse migraine ever)  Epidural injections? no  Prior PREVENTATIVE medications: antidepressants (amitriptyline), topiramate- this was d/c due to renal stones  Prior ABORTIVE mediations: acetaminophen, NSAIDs (ibuprofen), Tylenol Arthritis, sumatriptan PO- partially effective. Sumatriptan SC- effective quickly.  Gabapentin prescribed for surgical pain made migraines worse. Zofran as needed.      The following portions of the patient's history were reviewed and updated as appropriate: allergies, current medications, past family history, past medical history, past social history, past surgical history, and problem list.       Objective:    Blood pressure 106/60, pulse 96, height 5' 2\" (1.575 m), weight 52.4 kg (115 lb 9.6 oz), SpO2 98%.    Neurological Exam    On neurological examination patient is alert, awake, oriented and in no distress. Speech is fluent without dysarthria or aphasia. She is able to rise easily without assistance from a seated position. Casual gait is antalgic, she is ambulating with a orthopedic boot.        ROS:    Review of Systems   Constitutional:  Negative for appetite change, fatigue and fever.   HENT: Negative.  Negative for hearing loss, tinnitus, trouble swallowing and voice change.    Eyes: Negative.  Negative for photophobia, pain and visual disturbance.   Respiratory: Negative.  Negative for shortness of breath.    Cardiovascular: Negative.  Negative for palpitations.   Gastrointestinal: Negative.  Negative for nausea and vomiting.   Endocrine: Negative.  Negative for cold intolerance.   Genitourinary: Negative.  Negative for dysuria, frequency and urgency.   Musculoskeletal:  Negative for back pain, gait problem, myalgias, neck pain and neck stiffness.   Skin: Negative.  Negative for rash.   Allergic/Immunologic: Negative.  "   Neurological:  Positive for dizziness (onset) and headaches (3-4 wkly). Negative for tremors, seizures, syncope, facial asymmetry, speech difficulty, weakness, light-headedness and numbness.   Hematological: Negative.  Does not bruise/bleed easily.   Psychiatric/Behavioral: Negative.  Negative for confusion, hallucinations and sleep disturbance.    All other systems reviewed and are negative.    Reviewed ROS as entered by medical assistant.

## 2024-07-31 NOTE — PATIENT INSTRUCTIONS
"Headache/migraine treatment:   - When you have a moderate to severe headache, you should seek rest, initiate relaxation and apply cold compresses to the head.      Abortive medications (for immediate treatment of a headache):   It is ok to take ibuprofen, acetaminophen or naproxen (Advil, Tylenol,  Aleve, Excedrin) if they help your headaches you should limit these to no more than 3 times a week to avoid medication overuse/rebound headaches.      Take at the onset of a migraine use Sumatriptan SC 6 mg +/- zofran 4 mg +/- Tylenol 1000 mg  You may repeat sumatriptan 6 mg in 1 hour if needed. Max 12 mg/day, Max 3 days per week.     Over the counter preventive supplements for headaches/migraines   (to take every day to help prevent headaches - not to take at the time of headache):  [x] Magnesium 500 mg 1-2 times daily (If any diarrhea or upset stomach, decrease dose  as tolerated)  [x] Riboflavin (Vitamin B2) 400mg daily (FYI B2 may make your urine bright/neon yellow)     Prescription preventive medications for headaches/migraines   (to take every day to help prevent headaches - not to take at the time of headache):     Continue Amitriptyline 50 mg daily for now; Complete EKG on Friday then will increase to Amitriptyline 75 mg daily.       *Typically these types of medications take time untill you see the benefit, although some may see improvement in days, often it may take weeks, especially if the medication is being titrated up to a beneficial level. Please contact us if there are any concerns or questions regarding the medication.      Self-Monitoring:  [x] Headache calendar. Each day donna a number from 0-10 indicating if there was a headache and how bad it was.  This can be used to monitor gradual improvement and is helpful to make medication adjustments. You can do this on paper or there is an ANNY for a smart phone called \"Migraine e Diary\".      Lifestyle Recommendations:  [x] SLEEP - Maintain a regular sleep " schedule: Adults need at least 7-8 hours of uninterrupted a night. Maintain good sleep hygiene:  Going to bed and waking up at consistent times, avoiding excessive daytime naps, avoiding caffeinated beverages in the evening, avoid excessive stimulation in the evening and generally using bed primarily for sleeping.  One hour before bedtime would recommend turning lights down lower, decreasing your activity (may read quietly, listen to music at a low volume). When you get into bed, should eliminate all technology (no texting, emailing, playing with your phone, iPad or tablet in bed).  [x] HYDRATION - Maintain good hydration.  Drink  2L of fluid a day (4 typical small water bottles)  [x] DIET - Maintain good nutrition. In particular don't skip meals and try and eat healthy balanced meals regularly.  [x] TRIGGERS - Look for other triggers and avoid them: Limit caffeine to 1-2 cups a day or less. Avoid dietary triggers that you have noticed bring on your headaches (this could include aged cheese, peanuts, MSG, aspartame and nitrates).  [x] EXERCISE - physical exercise as we all know is good for you in many ways, and not only is good for your heart, but also is beneficial for your mental health, cognitive health and  chronic pain/headaches. I would encourage at the least 5 days of physical exercise weekly for at least 30 minutes.      Education and Follow-up  [x] Please call with any questions or concerns. Of course if any new concerning symptoms go to the emergency department.  [x] Follow up in 3 months, sooner if needed

## 2024-08-02 ENCOUNTER — APPOINTMENT (OUTPATIENT)
Dept: LAB | Facility: CLINIC | Age: 39
End: 2024-08-02
Payer: COMMERCIAL

## 2024-08-02 DIAGNOSIS — E87.6 HYPOKALEMIA: ICD-10-CM

## 2024-08-02 LAB
ANION GAP SERPL CALCULATED.3IONS-SCNC: 10 MMOL/L (ref 4–13)
BUN SERPL-MCNC: 18 MG/DL (ref 5–25)
CALCIUM SERPL-MCNC: 8.2 MG/DL (ref 8.4–10.2)
CHLORIDE SERPL-SCNC: 108 MMOL/L (ref 96–108)
CO2 SERPL-SCNC: 17 MMOL/L (ref 21–32)
CREAT SERPL-MCNC: 1.37 MG/DL (ref 0.6–1.3)
GFR SERPL CREATININE-BSD FRML MDRD: 48 ML/MIN/1.73SQ M
GLUCOSE SERPL-MCNC: 77 MG/DL (ref 65–140)
POTASSIUM SERPL-SCNC: 3.7 MMOL/L (ref 3.5–5.3)
SODIUM SERPL-SCNC: 135 MMOL/L (ref 135–147)

## 2024-08-02 PROCEDURE — 80048 BASIC METABOLIC PNL TOTAL CA: CPT

## 2024-08-02 PROCEDURE — 36415 COLL VENOUS BLD VENIPUNCTURE: CPT

## 2024-08-09 ENCOUNTER — TELEPHONE (OUTPATIENT)
Age: 39
End: 2024-08-09

## 2024-08-09 NOTE — TELEPHONE ENCOUNTER
Patient called- she received a call from Nellie regarding her labs from 8/2 and she wanted to return the call.    No documentation on this, please advise.     She is available for the remainder of the day and this is a good call back number: [phone].

## 2024-08-09 NOTE — TELEPHONE ENCOUNTER
Called patient back- I saw Nellie's note was put in as I put my note in.    I read her Nellie's note:    Attempted to call patient due to creatinine 1.37 mg/dL with usual baseline 0.9-1.0., left detailed voicemail on patient and spouse's voicemails separately to advise patient to report to ED if she was feeling unwell (vomiting, nausea, fatigue, decreased urine output, no oral solute intake).  If patient had been ill but is now feeling better and is eating and drinking appropriately recommend repeat labs in 1 week.  BMP ordered for 1 week.     She verbalized understanding and is heading to the ED. She said it has been a rough couple of days and has been feeling very sick.

## 2024-08-12 ENCOUNTER — OFFICE VISIT (OUTPATIENT)
Dept: LAB | Facility: HOSPITAL | Age: 39
End: 2024-08-12
Payer: COMMERCIAL

## 2024-08-12 ENCOUNTER — PATIENT MESSAGE (OUTPATIENT)
Age: 39
End: 2024-08-12

## 2024-08-12 ENCOUNTER — APPOINTMENT (OUTPATIENT)
Dept: LAB | Facility: HOSPITAL | Age: 39
End: 2024-08-12
Payer: COMMERCIAL

## 2024-08-12 DIAGNOSIS — G43.709 CHRONIC MIGRAINE WITHOUT AURA WITHOUT STATUS MIGRAINOSUS, NOT INTRACTABLE: ICD-10-CM

## 2024-08-12 DIAGNOSIS — R42 VERTIGO: ICD-10-CM

## 2024-08-12 DIAGNOSIS — Z51.81 MEDICATION MONITORING ENCOUNTER: ICD-10-CM

## 2024-08-12 DIAGNOSIS — E87.6 HYPOKALEMIA: ICD-10-CM

## 2024-08-12 LAB
ANION GAP SERPL CALCULATED.3IONS-SCNC: 7 MMOL/L (ref 4–13)
ATRIAL RATE: 68 BPM
BUN SERPL-MCNC: 14 MG/DL (ref 5–25)
CALCIUM SERPL-MCNC: 8.6 MG/DL (ref 8.4–10.2)
CHLORIDE SERPL-SCNC: 107 MMOL/L (ref 96–108)
CO2 SERPL-SCNC: 23 MMOL/L (ref 21–32)
CREAT SERPL-MCNC: 1.06 MG/DL (ref 0.6–1.3)
GFR SERPL CREATININE-BSD FRML MDRD: 65 ML/MIN/1.73SQ M
GLUCOSE SERPL-MCNC: 84 MG/DL (ref 65–140)
P AXIS: 62 DEGREES
POTASSIUM SERPL-SCNC: 3.7 MMOL/L (ref 3.5–5.3)
PR INTERVAL: 126 MS
QRS AXIS: 35 DEGREES
QRSD INTERVAL: 98 MS
QT INTERVAL: 376 MS
QTC INTERVAL: 399 MS
SODIUM SERPL-SCNC: 137 MMOL/L (ref 135–147)
T WAVE AXIS: 58 DEGREES
VENTRICULAR RATE: 68 BPM

## 2024-08-12 PROCEDURE — 93010 ELECTROCARDIOGRAM REPORT: CPT | Performed by: INTERNAL MEDICINE

## 2024-08-12 PROCEDURE — 93005 ELECTROCARDIOGRAM TRACING: CPT

## 2024-08-12 PROCEDURE — 36415 COLL VENOUS BLD VENIPUNCTURE: CPT

## 2024-08-12 PROCEDURE — 80048 BASIC METABOLIC PNL TOTAL CA: CPT

## 2024-08-12 RX ORDER — AMITRIPTYLINE HYDROCHLORIDE 50 MG/1
75 TABLET ORAL
Qty: 135 TABLET | Refills: 1 | Status: SHIPPED | OUTPATIENT
Start: 2024-08-12

## 2024-08-12 RX ORDER — MECLIZINE HCL 12.5 MG 12.5 MG/1
12.5 TABLET ORAL 3 TIMES DAILY PRN
Qty: 30 TABLET | Refills: 0 | Status: SHIPPED | OUTPATIENT
Start: 2024-08-12

## 2024-08-12 RX ORDER — AMITRIPTYLINE HYDROCHLORIDE 50 MG/1
50 TABLET ORAL
Qty: 90 TABLET | Refills: 0 | Status: CANCELLED | OUTPATIENT
Start: 2024-08-12

## 2024-08-16 ENCOUNTER — APPOINTMENT (OUTPATIENT)
Dept: LAB | Facility: CLINIC | Age: 39
End: 2024-08-16
Payer: COMMERCIAL

## 2024-08-16 DIAGNOSIS — E87.6 HYPOKALEMIA: ICD-10-CM

## 2024-08-16 LAB
ANION GAP SERPL CALCULATED.3IONS-SCNC: 13 MMOL/L (ref 4–13)
BUN SERPL-MCNC: 22 MG/DL (ref 5–25)
CALCIUM SERPL-MCNC: 9 MG/DL (ref 8.4–10.2)
CHLORIDE SERPL-SCNC: 102 MMOL/L (ref 96–108)
CO2 SERPL-SCNC: 20 MMOL/L (ref 21–32)
CREAT SERPL-MCNC: 1.16 MG/DL (ref 0.6–1.3)
GFR SERPL CREATININE-BSD FRML MDRD: 59 ML/MIN/1.73SQ M
GLUCOSE SERPL-MCNC: 73 MG/DL (ref 65–140)
POTASSIUM SERPL-SCNC: 3.6 MMOL/L (ref 3.5–5.3)
SODIUM SERPL-SCNC: 135 MMOL/L (ref 135–147)

## 2024-08-16 PROCEDURE — 80048 BASIC METABOLIC PNL TOTAL CA: CPT

## 2024-08-16 PROCEDURE — 36415 COLL VENOUS BLD VENIPUNCTURE: CPT

## 2024-08-19 ENCOUNTER — APPOINTMENT (OUTPATIENT)
Dept: LAB | Facility: HOSPITAL | Age: 39
End: 2024-08-19
Payer: COMMERCIAL

## 2024-08-19 DIAGNOSIS — E87.6 HYPOKALEMIA: ICD-10-CM

## 2024-08-19 LAB
ANION GAP SERPL CALCULATED.3IONS-SCNC: 12 MMOL/L (ref 4–13)
BUN SERPL-MCNC: 20 MG/DL (ref 5–25)
CALCIUM SERPL-MCNC: 9 MG/DL (ref 8.4–10.2)
CHLORIDE SERPL-SCNC: 100 MMOL/L (ref 96–108)
CO2 SERPL-SCNC: 23 MMOL/L (ref 21–32)
CREAT SERPL-MCNC: 1.28 MG/DL (ref 0.6–1.3)
GFR SERPL CREATININE-BSD FRML MDRD: 52 ML/MIN/1.73SQ M
GLUCOSE SERPL-MCNC: 86 MG/DL (ref 65–140)
POTASSIUM SERPL-SCNC: 2.9 MMOL/L (ref 3.5–5.3)
SODIUM SERPL-SCNC: 135 MMOL/L (ref 135–147)

## 2024-08-19 PROCEDURE — 36415 COLL VENOUS BLD VENIPUNCTURE: CPT

## 2024-08-19 PROCEDURE — 80048 BASIC METABOLIC PNL TOTAL CA: CPT

## 2024-08-20 ENCOUNTER — TELEPHONE (OUTPATIENT)
Age: 39
End: 2024-08-20

## 2024-08-21 ENCOUNTER — TELEPHONE (OUTPATIENT)
Age: 39
End: 2024-08-21

## 2024-08-21 ENCOUNTER — APPOINTMENT (OUTPATIENT)
Dept: LAB | Facility: HOSPITAL | Age: 39
DRG: 425 | End: 2024-08-21
Payer: COMMERCIAL

## 2024-08-21 DIAGNOSIS — E87.6 HYPOKALEMIA: ICD-10-CM

## 2024-08-21 LAB
ANION GAP SERPL CALCULATED.3IONS-SCNC: 16 MMOL/L (ref 4–13)
BUN SERPL-MCNC: 22 MG/DL (ref 5–25)
CALCIUM SERPL-MCNC: 8.9 MG/DL (ref 8.4–10.2)
CHLORIDE SERPL-SCNC: 98 MMOL/L (ref 96–108)
CO2 SERPL-SCNC: 21 MMOL/L (ref 21–32)
CREAT SERPL-MCNC: 1.19 MG/DL (ref 0.6–1.3)
GFR SERPL CREATININE-BSD FRML MDRD: 57 ML/MIN/1.73SQ M
GLUCOSE SERPL-MCNC: 76 MG/DL (ref 65–140)
POTASSIUM SERPL-SCNC: 3 MMOL/L (ref 3.5–5.3)
SODIUM SERPL-SCNC: 135 MMOL/L (ref 135–147)

## 2024-08-21 PROCEDURE — 80048 BASIC METABOLIC PNL TOTAL CA: CPT

## 2024-08-21 PROCEDURE — 36415 COLL VENOUS BLD VENIPUNCTURE: CPT

## 2024-08-21 NOTE — TELEPHONE ENCOUNTER
[facility] gave us a call yesterday asking to refill potassium chloride for a pt.   Relayed message to , he stated that they need to send in a script which they did not.   Gave them a call this morning to let them know Dr is requesting slip and they told me a pharmacist will be in touch with me shortly. They also provided a number to call ([phone])

## 2024-08-21 NOTE — TELEPHONE ENCOUNTER
[facility] returning call stating they will fax rx order for Dr. Reynolds to sign.  
[facility] calling for refill of potassium chloride. States her levels were still low. Please review refill request and send accordingly.  
Please call [facility], and asked them to send over there prescription for the potassium, or a fax number or any other way to contact them regarding potassium infusions
22

## 2024-08-23 ENCOUNTER — HOSPITAL ENCOUNTER (INPATIENT)
Facility: HOSPITAL | Age: 39
LOS: 1 days | Discharge: HOME | DRG: 425 | End: 2024-08-24
Attending: FAMILY MEDICINE | Admitting: FAMILY MEDICINE
Payer: COMMERCIAL

## 2024-08-23 ENCOUNTER — APPOINTMENT (OUTPATIENT)
Dept: LAB | Facility: HOSPITAL | Age: 39
DRG: 425 | End: 2024-08-23
Payer: COMMERCIAL

## 2024-08-23 DIAGNOSIS — E87.6 HYPOKALEMIA: Primary | ICD-10-CM

## 2024-08-23 DIAGNOSIS — E87.6 HYPOKALEMIA: ICD-10-CM

## 2024-08-23 LAB
ANION GAP SERPL CALCULATED.3IONS-SCNC: 11 MMOL/L (ref 4–13)
ANION GAP SERPL CALCULATED.3IONS-SCNC: 8 MMOL/L (ref 4–13)
BUN SERPL-MCNC: 20 MG/DL (ref 5–25)
BUN SERPL-MCNC: 22 MG/DL (ref 5–25)
CALCIUM SERPL-MCNC: 8.2 MG/DL (ref 8.4–10.2)
CALCIUM SERPL-MCNC: 9 MG/DL (ref 8.4–10.2)
CHLORIDE SERPL-SCNC: 100 MMOL/L (ref 96–108)
CHLORIDE SERPL-SCNC: 96 MMOL/L (ref 96–108)
CO2 SERPL-SCNC: 25 MMOL/L (ref 21–32)
CO2 SERPL-SCNC: 27 MMOL/L (ref 21–32)
CREAT SERPL-MCNC: 1.16 MG/DL (ref 0.6–1.3)
CREAT SERPL-MCNC: 1.26 MG/DL (ref 0.6–1.3)
CREAT UR-MCNC: 56.1 MG/DL
ERYTHROCYTE [DISTWIDTH] IN BLOOD BY AUTOMATED COUNT: 15.9 % (ref 11.6–15.1)
FERRITIN SERPL-MCNC: 8 NG/ML (ref 11–307)
FOLATE SERPL-MCNC: >22.3 NG/ML
GFR SERPL CREATININE-BSD FRML MDRD: 53 ML/MIN/1.73SQ M
GFR SERPL CREATININE-BSD FRML MDRD: 59 ML/MIN/1.73SQ M
GLUCOSE P FAST SERPL-MCNC: 87 MG/DL (ref 65–99)
GLUCOSE SERPL-MCNC: 95 MG/DL (ref 65–140)
HCT VFR BLD AUTO: 33.9 % (ref 34.8–46.1)
HGB BLD-MCNC: 11 G/DL (ref 11.5–15.4)
IRON SATN MFR SERPL: 5 % (ref 15–50)
IRON SERPL-MCNC: 20 UG/DL (ref 50–212)
MCH RBC QN AUTO: 26.4 PG (ref 26.8–34.3)
MCHC RBC AUTO-ENTMCNC: 32.4 G/DL (ref 31.4–37.4)
MCV RBC AUTO: 81 FL (ref 82–98)
OSMOLALITY UR/SERPL-RTO: 279 MMOL/KG (ref 282–298)
OSMOLALITY UR: 258 MMOL/KG
PLATELET # BLD AUTO: 339 THOUSANDS/UL (ref 149–390)
PMV BLD AUTO: 9.4 FL (ref 8.9–12.7)
POTASSIUM SERPL-SCNC: 2.5 MMOL/L (ref 3.5–5.3)
POTASSIUM SERPL-SCNC: 2.9 MMOL/L (ref 3.5–5.3)
POTASSIUM UR-SCNC: 3.8 MMOL/L
RBC # BLD AUTO: 4.17 MILLION/UL (ref 3.81–5.12)
SODIUM 24H UR-SCNC: <10 MOL/L
SODIUM SERPL-SCNC: 133 MMOL/L (ref 135–147)
SODIUM SERPL-SCNC: 134 MMOL/L (ref 135–147)
TIBC SERPL-MCNC: 421 UG/DL (ref 250–450)
TSH SERPL DL<=0.05 MIU/L-ACNC: 2.34 UIU/ML (ref 0.45–4.5)
UIBC SERPL-MCNC: 401 UG/DL (ref 155–355)
VIT B12 SERPL-MCNC: 153 PG/ML (ref 180–914)
WBC # BLD AUTO: 7.91 THOUSAND/UL (ref 4.31–10.16)

## 2024-08-23 PROCEDURE — 80048 BASIC METABOLIC PNL TOTAL CA: CPT | Performed by: FAMILY MEDICINE

## 2024-08-23 PROCEDURE — 82272 OCCULT BLD FECES 1-3 TESTS: CPT | Performed by: INTERNAL MEDICINE

## 2024-08-23 PROCEDURE — 82570 ASSAY OF URINE CREATININE: CPT | Performed by: INTERNAL MEDICINE

## 2024-08-23 PROCEDURE — 83935 ASSAY OF URINE OSMOLALITY: CPT | Performed by: INTERNAL MEDICINE

## 2024-08-23 PROCEDURE — 84133 ASSAY OF URINE POTASSIUM: CPT | Performed by: INTERNAL MEDICINE

## 2024-08-23 PROCEDURE — 99255 IP/OBS CONSLTJ NEW/EST HI 80: CPT | Performed by: INTERNAL MEDICINE

## 2024-08-23 PROCEDURE — 83540 ASSAY OF IRON: CPT | Performed by: INTERNAL MEDICINE

## 2024-08-23 PROCEDURE — 80048 BASIC METABOLIC PNL TOTAL CA: CPT

## 2024-08-23 PROCEDURE — 82746 ASSAY OF FOLIC ACID SERUM: CPT | Performed by: INTERNAL MEDICINE

## 2024-08-23 PROCEDURE — 84300 ASSAY OF URINE SODIUM: CPT | Performed by: INTERNAL MEDICINE

## 2024-08-23 PROCEDURE — 84443 ASSAY THYROID STIM HORMONE: CPT | Performed by: INTERNAL MEDICINE

## 2024-08-23 PROCEDURE — 83930 ASSAY OF BLOOD OSMOLALITY: CPT | Performed by: INTERNAL MEDICINE

## 2024-08-23 PROCEDURE — 36415 COLL VENOUS BLD VENIPUNCTURE: CPT

## 2024-08-23 PROCEDURE — 93005 ELECTROCARDIOGRAM TRACING: CPT

## 2024-08-23 PROCEDURE — 82607 VITAMIN B-12: CPT | Performed by: INTERNAL MEDICINE

## 2024-08-23 PROCEDURE — 85027 COMPLETE CBC AUTOMATED: CPT

## 2024-08-23 PROCEDURE — 82728 ASSAY OF FERRITIN: CPT | Performed by: INTERNAL MEDICINE

## 2024-08-23 PROCEDURE — 99222 1ST HOSP IP/OBS MODERATE 55: CPT | Performed by: FAMILY MEDICINE

## 2024-08-23 PROCEDURE — 83735 ASSAY OF MAGNESIUM: CPT | Performed by: FAMILY MEDICINE

## 2024-08-23 PROCEDURE — 83550 IRON BINDING TEST: CPT | Performed by: INTERNAL MEDICINE

## 2024-08-23 RX ORDER — SUMATRIPTAN 6 MG/.5ML
6 INJECTION, SOLUTION SUBCUTANEOUS ONCE AS NEEDED
Status: CANCELLED | OUTPATIENT
Start: 2024-08-23

## 2024-08-23 RX ORDER — SUMATRIPTAN 6 MG/.5ML
6 INJECTION, SOLUTION SUBCUTANEOUS ONCE AS NEEDED
Status: DISCONTINUED | OUTPATIENT
Start: 2024-08-23 | End: 2024-08-24 | Stop reason: HOSPADM

## 2024-08-23 RX ORDER — ERGOCALCIFEROL 1.25 MG/1
50000 CAPSULE, LIQUID FILLED ORAL 2 TIMES WEEKLY
Status: DISCONTINUED | OUTPATIENT
Start: 2024-08-26 | End: 2024-08-24 | Stop reason: HOSPADM

## 2024-08-23 RX ORDER — ENOXAPARIN SODIUM 100 MG/ML
40 INJECTION SUBCUTANEOUS DAILY
Status: DISCONTINUED | OUTPATIENT
Start: 2024-08-23 | End: 2024-08-24 | Stop reason: HOSPADM

## 2024-08-23 RX ORDER — SPIRONOLACTONE 25 MG/1
12.5 TABLET ORAL DAILY
Status: DISCONTINUED | OUTPATIENT
Start: 2024-08-24 | End: 2024-08-24 | Stop reason: HOSPADM

## 2024-08-23 RX ORDER — ONDANSETRON 4 MG/1
4 TABLET, ORALLY DISINTEGRATING ORAL EVERY 6 HOURS PRN
Status: CANCELLED | OUTPATIENT
Start: 2024-08-23

## 2024-08-23 RX ORDER — CHOLECALCIFEROL (VITAMIN D3) 125 MCG
10000 CAPSULE ORAL 2 TIMES DAILY
Status: DISCONTINUED | OUTPATIENT
Start: 2024-08-23 | End: 2024-08-24 | Stop reason: HOSPADM

## 2024-08-23 RX ORDER — MECLIZINE HCL 12.5 MG 12.5 MG/1
12.5 TABLET ORAL 3 TIMES DAILY PRN
Status: CANCELLED | OUTPATIENT
Start: 2024-08-23

## 2024-08-23 RX ORDER — MECLIZINE HCL 12.5 MG 12.5 MG/1
12.5 TABLET ORAL 3 TIMES DAILY PRN
Status: DISCONTINUED | OUTPATIENT
Start: 2024-08-23 | End: 2024-08-24 | Stop reason: HOSPADM

## 2024-08-23 RX ORDER — ESCITALOPRAM OXALATE 10 MG/1
20 TABLET ORAL
Status: DISCONTINUED | OUTPATIENT
Start: 2024-08-23 | End: 2024-08-24 | Stop reason: HOSPADM

## 2024-08-23 RX ORDER — POTASSIUM CHLORIDE 14.9 MG/ML
20 INJECTION INTRAVENOUS
Status: COMPLETED | OUTPATIENT
Start: 2024-08-23 | End: 2024-08-23

## 2024-08-23 RX ORDER — POTASSIUM CHLORIDE 29.8 MG/ML
40 INJECTION INTRAVENOUS EVERY 8 HOURS
Status: DISCONTINUED | OUTPATIENT
Start: 2024-08-23 | End: 2024-08-23

## 2024-08-23 RX ORDER — ONDANSETRON 4 MG/1
4 TABLET, ORALLY DISINTEGRATING ORAL EVERY 6 HOURS PRN
Status: DISCONTINUED | OUTPATIENT
Start: 2024-08-23 | End: 2024-08-24 | Stop reason: HOSPADM

## 2024-08-23 RX ORDER — POTASSIUM CHLORIDE 14.9 MG/ML
20 INJECTION INTRAVENOUS
Status: DISCONTINUED | OUTPATIENT
Start: 2024-08-24 | End: 2024-08-24 | Stop reason: HOSPADM

## 2024-08-23 RX ORDER — POTASSIUM CHLORIDE 14.9 MG/ML
20 INJECTION INTRAVENOUS
Status: DISCONTINUED | OUTPATIENT
Start: 2024-08-24 | End: 2024-08-23

## 2024-08-23 RX ORDER — POTASSIUM CHLORIDE 14.9 MG/ML
20 INJECTION INTRAVENOUS ONCE
Status: COMPLETED | OUTPATIENT
Start: 2024-08-23 | End: 2024-08-23

## 2024-08-23 RX ORDER — POTASSIUM CHLORIDE 14.9 MG/ML
20 INJECTION INTRAVENOUS
Status: COMPLETED | OUTPATIENT
Start: 2024-08-24 | End: 2024-08-24

## 2024-08-23 RX ORDER — ESCITALOPRAM OXALATE 10 MG/1
20 TABLET ORAL
Status: CANCELLED | OUTPATIENT
Start: 2024-08-23

## 2024-08-23 RX ADMIN — AMITRIPTYLINE HYDROCHLORIDE 75 MG: 25 TABLET, FILM COATED ORAL at 21:41

## 2024-08-23 RX ADMIN — Medication 10000 UNITS: at 17:37

## 2024-08-23 RX ADMIN — POTASSIUM CHLORIDE 20 MEQ: 14.9 INJECTION, SOLUTION INTRAVENOUS at 15:47

## 2024-08-23 RX ADMIN — POTASSIUM CHLORIDE 20 MEQ: 14.9 INJECTION, SOLUTION INTRAVENOUS at 17:37

## 2024-08-23 RX ADMIN — POTASSIUM CHLORIDE 20 MEQ: 14.9 INJECTION, SOLUTION INTRAVENOUS at 19:32

## 2024-08-23 RX ADMIN — ESCITALOPRAM OXALATE 20 MG: 10 TABLET ORAL at 21:41

## 2024-08-23 NOTE — PLAN OF CARE
Problem: PAIN - ADULT  Goal: Verbalizes/displays adequate comfort level or baseline comfort level  Description: Interventions:  - Encourage patient to monitor pain and request assistance  - Assess pain using appropriate pain scale  - Administer analgesics based on type and severity of pain and evaluate response  - Implement non-pharmacological measures as appropriate and evaluate response  - Consider cultural and social influences on pain and pain management  - Notify physician/advanced practitioner if interventions unsuccessful or patient reports new pain  Outcome: Progressing     Problem: DISCHARGE PLANNING  Goal: Discharge to home or other facility with appropriate resources  Description: INTERVENTIONS:  - Identify barriers to discharge w/patient and caregiver  - Arrange for needed discharge resources and transportation as appropriate  - Identify discharge learning needs (meds, wound care, etc.)  - Arrange for interpretive services to assist at discharge as needed  - Refer to Case Management Department for coordinating discharge planning if the patient needs post-hospital services based on physician/advanced practitioner order or complex needs related to functional status, cognitive ability, or social support system  Outcome: Progressing     Problem: Knowledge Deficit  Goal: Patient/family/caregiver demonstrates understanding of disease process, treatment plan, medications, and discharge instructions  Description: Complete learning assessment and assess knowledge base.  Interventions:  - Provide teaching at level of understanding  - Provide teaching via preferred learning methods  Outcome: Progressing     Problem: METABOLIC, FLUID AND ELECTROLYTES - ADULT  Goal: Electrolytes maintained within normal limits  Description: INTERVENTIONS:  - Monitor labs and assess patient for signs and symptoms of electrolyte imbalances  - Administer electrolyte replacement as ordered  - Monitor response to electrolyte  replacements, including repeat lab results as appropriate  - Instruct patient on fluid and nutrition as appropriate  Outcome: Progressing  Goal: Fluid balance maintained  Description: INTERVENTIONS:  - Monitor labs   - Monitor I/O and WT  - Instruct patient on fluid and nutrition as appropriate  - Assess for signs & symptoms of volume excess or deficit  Outcome: Progressing

## 2024-08-23 NOTE — CONSULTS
VIRTUAL CARE DOCUMENTATION:     1. This service was provided via Telemedicine using [employer]     2. Parties in the room with patient during teleconsult Patient only    3. Confidentiality My office door was closed     4. Participants No one else was in the room    5. Patient acknowledged consent and understanding of privacy and security of the  Telemedicine consult. I informed the patient that I have reviewed their record in Epic and presented the opportunity for them to ask any questions regarding the visit today.  The patient agreed to participate.    6. Time spent 50 minutes including reviewing chart, seeing patient documenting and discussing with providers     Consultation - Nephrology   Brittani HAJA Patino 39 y.o. female MRN: [id number]  Unit/Bed#: -01 Encounter: [id number]    ASSESSMENT & PLAN    39-year-old female with a past medical history of obesity status post gastric bypass, refractory hypokalemia requiring intravenous infusions at night,    Resistant hypokalemia-this is likely GI related.  She follows with Dr. Ricketts who has worked this up in the past.  She gets an 8-hour infusion of potassium chloride, she receives 10 mill equivalents per hour and she has been completing this but unfortunately her potassium remains low.we will check a urine potassium, urine creatinine, renin, aldosterone for completeness  For repletion will give 40 mill equivalents of potassium chloride every 6-8 hours will attempt to give 40 mill equivalents of potassium chloride orally as well although she has had poor absorption of this in the past  Check BMPs every 12 hours and maintain a potassium less than 5 and greater than 4  Will add Aldactone 12.5 mg daily after labs after renin and aldosterone are completed    Hyponatremia-her sodium is low and this may be related to poor solute intake.  Will check a urine sodium, urine osmolality, serum osmolality, TSH, a.m. cortisol for completeness    Stage IIIa chronic kidney  disease with a baseline GFR of 45 to 60 mL/min-her creatinine remains at baseline    History of nephrolithiasis-has not actively passed a kidney stone lately.    History of a gastric bypass-could be the reason of her poor absorption    Anemia in the setting of chronic kidney disease-she does have a low MCV, check iron studies, for completeness check B12 and folate, she has a history of rectal bleeding and a stool occult      HISTORY OF PRESENT ILLNESS:  Requesting Physician: Pallavi Argueta MD  Reason for Consult: Hypokalemia    Brittani Patino is a 39 y.o. year old female who was admitted for hypokalemia.  She was directly admitted, she has a history of chronic hypokalemia, she has had severe weakness, she has been on IV infusions as an outpatient however has not consistently been low despite her IV infusion.  She has had urine potassiums in the past that have been low.  She also has kidney stones and there was concerns for renal tubular acidosis.  Nevertheless she has poor absorption of her potassium.  And may not have a high potassium diet either.  Besides her weakness she is urinating okay.  She denies any other acute complaints.  No chest pain or shortness of breath, no nausea vomiting diarrhea constipation.    PAST MEDICAL HISTORY:  Past Medical History:   Diagnosis Date    C. difficile colitis 2016    COVID-19     2022- pt denies long covid    DUB (dysfunctional uterine bleeding)     H. pylori infection     Hypertension     Hypokalemia     Kidney stone 2023    Left lower quadrant abdominal pain 2020    Migraine     WAGNER (obstructive sleep apnea) 2014    Pancreatitis     Psychiatric disorder     Anxiety    Rectal bleeding     Renal disorder     Rhabdomyolysis     Sleep apnea     resolved with bariatric surgery    Thrombosed external hemorrhoid        PAST SURGICAL HISTORY:  Past Surgical History:   Procedure Laterality Date    ABDOMINAL SURGERY      APPENDECTOMY       SECTION       "CHOLECYSTECTOMY      COSMETIC SURGERY      \"tummy tuck\"    FL GUIDED CENTRAL VENOUS ACCESS DEVICE INSERTION  12/21/2018    FL GUIDED CENTRAL VENOUS ACCESS DEVICE INSERTION  2/9/2024    FL RETROGRADE PYELOGRAM  1/3/2023    GASTRIC BYPASS      HYSTERECTOMY      LAPAROSCOPY      NM ANRCT XM SURG REQ ANES GENERAL SPI/EDRL DX N/A 9/23/2021    Procedure: ANAL EXAM UNDER ANESTHESIA; HEMORRHOIDECTOMY;  Surgeon: Dona Jeffries DO;  Location: [facility];  Service: General    NM CYSTO/URETERO W/LITHOTRIPSY &INDWELL STENT INSRT Left 1/3/2023    Procedure: CYSTOSCOPY URETEROSCOPY WITH RETROGRADE PYELOGRAM, BASKET STONE EXTRACTION AND INSERTION STENT URETERAL;  Surgeon: Geovanny Rosales MD;  Location: [facility];  Service: Urology    NM EXC THROMBOSED HEMORRHOID XTRNL N/A 7/8/2022    Procedure: EXCISION OF THROMBOSED EXTERNAL HEMORRHOID, Excision of perianal skin tag, dranage of perianal abscess, anal fistulatomy;  Surgeon: Dona Jeffries DO;  Location: [facility];  Service: General    NM INSJ TUNNELED CTR VAD W/SUBQ PORT AGE 4 YR/> Left 2/9/2024    Procedure: INSERTION VENOUS PORT (PORT-A-CATH);  Surgeon: Ryan Singh DO;  Location: [facility];  Service: General    NM RMVL AMIRAH CTR VAD W/SUBQ PORT/ CTR/PRPH INSJ Right 2/9/2024    Procedure: REMOVAL VENOUS PORT (PORT-A-CATH);  Surgeon: Ryan Singh DO;  Location: [facility];  Service: General    TUNNELED VENOUS PORT PLACEMENT N/A 12/21/2018    Procedure: INSERTION VENOUS PORT (PORT-A-CATH);  Surgeon: Ryan Singh DO;  Location: [facility];  Service: General       ALLERGIES:  Allergies   Allergen Reactions    Nsaids Other (See Comments)     Other reaction(s): Other (Please comment)  Should not take s/p bariatric surgery  Other reaction(s): Other (See Comments)  Should not take as per prior records  Should not take s/p bariatric surgery  Has hx of gastric bypass      Prednisone      Nausea, vomiting, diarrhea       SOCIAL HISTORY:  Social History "     Substance and Sexual Activity   Alcohol Use Not Currently     Social History     Substance and Sexual Activity   Drug Use Not Currently    Types: Marijuana    Comment: Medical marijuana     Social History     Tobacco Use   Smoking Status Never   Smokeless Tobacco Never       FAMILY HISTORY:  Family History   Problem Relation Age of Onset    No Known Problems Mother     Hypertension Father     Diabetes Father     Urolithiasis Father     Prostate cancer Father     Diabetes Maternal Grandfather        MEDICATIONS:    Current Facility-Administered Medications:     amitriptyline (ELAVIL) tablet 75 mg, 75 mg, Oral, HS, Karmen Torres PA-C    enoxaparin (LOVENOX) subcutaneous injection 40 mg, 40 mg, Subcutaneous, Daily, Karmen Torres PA-C    [START ON 8/26/2024] ergocalciferol (VITAMIN D2) capsule 50,000 Units, 50,000 Units, Oral, Once per day on Monday Thursday, Karmen Torres PA-C    escitalopram (LEXAPRO) tablet 20 mg, 20 mg, Oral, HS, Karmen Torres PA-C    meclizine (ANTIVERT) tablet 12.5 mg, 12.5 mg, Oral, TID PRN, Karmen Torres PA-C    ondansetron (ZOFRAN-ODT) dispersible tablet 4 mg, 4 mg, Oral, Q6H PRN, Karmen Torres PA-C    potassium chloride 20 mEq IVPB (premix), 20 mEq, Intravenous, Q2H, Pallavi Argueta MD    [START ON 8/24/2024] potassium chloride 20 mEq IVPB (premix), 20 mEq, Intravenous, Q2H, Pallavi Argueta MD    [START ON 8/24/2024] potassium chloride 20 mEq IVPB (premix), 20 mEq, Intravenous, Q2H, Pallavi Argueta MD    [START ON 8/24/2024] potassium chloride 20 mEq IVPB (premix), 20 mEq, Intravenous, Q2H, Pallavi Argueta MD    SUMAtriptan (IMITREX) subcutaneous injection 6 mg, 6 mg, Subcutaneous, Once PRN, Karmen Torres PA-C    vitamin A capsule 10,000 Units, 10,000 Units, Oral, BID, Karmen Torres PA-C    REVIEW OF SYSTEMS:    A 12 point review of systems was performed and was negative besides what is mentioned in HPI    OBJECTIVE:    Vitals:    08/23/24 1254 08/23/24 1423  "  BP: 134/78 120/72   Pulse: 98 75   Resp: 18 18   Temp: (!) 97.2 °F (36.2 °C) 97.7 °F (36.5 °C)   TempSrc:  Temporal   SpO2: 98% 100%        Temp:  [97.2 °F (36.2 °C)-97.7 °F (36.5 °C)] 97.7 °F (36.5 °C)  HR:  [75-98] 75  Resp:  [18] 18  BP: (120-134)/(72-78) 120/72  SpO2:  [98 %-100 %] 100 %   There is no height or weight on file to calculate BMI.    No intake/output data recorded.  No intake/output data recorded.    Weight (last 2 days)       None             Physical exam:    General: no acute distress, cooperative  Eyes: conjunctivae pink, anicteric sclerae  ENT: lips and mucous membranes moist, no exudates, normal external ears  Neck: ROM intact, no JVD  Chest: No respiratory distress, no accessory muscle use  CVS: normal rate on the monitor  Abdomen: soft, non-tender, non-distended, normoactive bowel sounds  Extremities: no edema of both legs  Skin: no rash  Neuro: awake, alert, oriented, grossly intact  Psych:  Pleasant affect     Lab Results:   Results from last 7 days   Lab Units 08/23/24  1452 08/23/24  0730 08/21/24  0751 08/19/24  0728   WBC Thousand/uL 7.91  --   --   --    HEMOGLOBIN g/dL 11.0*  --   --   --    HEMATOCRIT % 33.9*  --   --   --    PLATELETS Thousands/uL 339  --   --   --    POTASSIUM mmol/L  --  2.5* 3.0* 2.9*   CHLORIDE mmol/L  --  96 98 100   CO2 mmol/L  --  27 21 23   BUN mg/dL  --  20 22 20   CREATININE mg/dL  --  1.26 1.19 1.28   CALCIUM mg/dL  --  9.0 8.9 9.0         Portions of the record may have been created with voice recognition software. Occasional wrong word or \"sound a like\" substitutions may have occurred due to the inherent limitations of voice recognition software. Read the chart carefully and recognize, using context, where substitutions have occurred.If you have any questions, please contact the dictating provider.    "

## 2024-08-23 NOTE — H&P
[facility]  [facility]  Name: Brittani Patino 39 y.o. female I MRN: [id number]  Unit/Bed#: -01 I Date of Admission: 8/23/2024   Date of Service: 8/23/2024 I Hospital Day: 0      Assessment & Plan   * Hypokalemia  Assessment & Plan  Patient presented to the ED after being sent by her nephrologist for abnormal labs this morning - found to have potassium of 2.5  Symptomatic with numbness/paraesthesias on R arm, and face  Has been hospitalized for similar in the past   Follows outpatient [facility] nephrology for electrolyte derangements   Utilizes port a cath for potassium supplementation (L chest) - home regimen: 80 mEq daily   Denies vomiting/diarrhea     Plan:   Start potassium supplementation 40 mEq q8 hours x4 doses per nephro recs   Nephrology consulted, appreciate recs   Continue to monitor BMP q8  EKG ordered  Monitor on telemetry   Notify provider for K <3, or >5    Iron deficiency  Assessment & Plan  2/2 gastric bypass surgery   Has required IV iron infusions in the past   Hgb on labs today stable at 11  Will order iron panel - pending results, will start IV venofer x3 doses     History of gastric bypass  Assessment & Plan  History of in 2014. Ongoing electrolyte imbalances since 2016.  Continue home vitamin A, and vitamin D  Next B12 injection due 9/1/24  Avoid NSAIDs          VTE Pharmacologic Prophylaxis:   Moderate Risk (Score 3-4) - Pharmacological DVT Prophylaxis Ordered: enoxaparin (Lovenox).  Code Status: Level 1 - Full Code   Discussion with family: Patient declined call to .     Anticipated Length of Stay: Patient will be admitted on an inpatient basis with an anticipated length of stay of greater than 2 midnights secondary to electrolyte derangements, hypokalemia.    Total Time Spent on Date of Encounter in care of patient: 55 mins. This time was spent on one or more of the following: performing physical exam; counseling and coordination of care; obtaining or  reviewing history; documenting in the medical record; reviewing/ordering tests, medications or procedures; communicating with other healthcare professionals and discussing with patient's family/caregivers.    Chief Complaint: Abnormal labs    History of Present Illness:  Brittani Patino is a 39 y.o. female with a PMH of gastric bypass, chronic hypokalemia, iron deficiency anemia, B12 deficiency, chronic pancreatitis, who presents from nephrology office with an abnormal lab result.  Patient has been following nephrology regarding her electrolytes since 2016.  Patient states that a couple days ago she started feeling paresthesias in her right arm and face.  States that she had gotten blood work done, and nephrology recommended an additional IV potassium dose.  At that time he recommended that if symptoms were not improved to repeat labs Friday morning 8/23.  She went for labs this morning and was found to have a potassium of 2.5.  She denies current abdominal symptoms, nausea, vomiting, diarrhea.  Only symptom of hypokalemia is her paresthesias. She states her paraesthesias are similar to previous hypokalemic episodes. She denies chest pain, SOB, N/V/D, vision changes, or weakness.  Patient will be admitted to the hospital for potassium repletion, nephrology consultation, and continued monitoring on telemetry.     Review of Systems:  Review of Systems   Constitutional: Negative.    HENT: Negative.     Eyes: Negative.    Respiratory: Negative.     Cardiovascular: Negative.    Gastrointestinal: Negative.    Endocrine: Negative.    Genitourinary: Negative.    Musculoskeletal: Negative.    Allergic/Immunologic: Negative.    Neurological:  Positive for numbness (paraesthesias to R arm and face). Negative for dizziness and weakness.   Hematological: Negative.    Psychiatric/Behavioral: Negative.         Past Medical and Surgical History:   Past Medical History:   Diagnosis Date    C. difficile colitis 2016    COVID-19      "2022- pt denies long covid    DUB (dysfunctional uterine bleeding)     H. pylori infection     Hypertension     Hypokalemia     Kidney stone 2023    Left lower quadrant abdominal pain 2020    Migraine     WAGNER (obstructive sleep apnea) 2014    Pancreatitis     Psychiatric disorder     Anxiety    Rectal bleeding     Renal disorder     Rhabdomyolysis     Sleep apnea     resolved with bariatric surgery    Thrombosed external hemorrhoid        Past Surgical History:   Procedure Laterality Date    ABDOMINAL SURGERY      APPENDECTOMY       SECTION      CHOLECYSTECTOMY      COSMETIC SURGERY      \"tummy tuck\"    FL GUIDED CENTRAL VENOUS ACCESS DEVICE INSERTION  2018    FL GUIDED CENTRAL VENOUS ACCESS DEVICE INSERTION  2024    FL RETROGRADE PYELOGRAM  1/3/2023    GASTRIC BYPASS      HYSTERECTOMY      LAPAROSCOPY      ID ANRCT XM SURG REQ ANES GENERAL SPI/EDRL DX N/A 2021    Procedure: ANAL EXAM UNDER ANESTHESIA; HEMORRHOIDECTOMY;  Surgeon: Dona Jeffries DO;  Location: [facility];  Service: General    ID CYSTO/URETERO W/LITHOTRIPSY &INDWELL STENT INSRT Left 1/3/2023    Procedure: CYSTOSCOPY URETEROSCOPY WITH RETROGRADE PYELOGRAM, BASKET STONE EXTRACTION AND INSERTION STENT URETERAL;  Surgeon: Geovanny Rosales MD;  Location: [facility];  Service: Urology    ID EXC THROMBOSED HEMORRHOID XTRNL N/A 2022    Procedure: EXCISION OF THROMBOSED EXTERNAL HEMORRHOID, Excision of perianal skin tag, dranage of perianal abscess, anal fistulatomy;  Surgeon: Dona Jeffries DO;  Location: [facility];  Service: General    ID INSJ TUNNELED CTR VAD W/SUBQ PORT AGE 5 YR/> Left 2024    Procedure: INSERTION VENOUS PORT (PORT-A-CATH);  Surgeon: Ryan Singh DO;  Location: [facility];  Service: General    ID RMVL AMIRAH CTR VAD W/SUBQ PORT/ CTR/PRPH INSJ Right 2024    Procedure: REMOVAL VENOUS PORT (PORT-A-CATH);  Surgeon: Ryan Singh DO;  Location: [facility];  " Service: General    TUNNELED VENOUS PORT PLACEMENT N/A 12/21/2018    Procedure: INSERTION VENOUS PORT (PORT-A-CATH);  Surgeon: Ryan Singh DO;  Location: [facility];  Service: General       Meds/Allergies:  Prior to Admission medications    Medication Sig Start Date End Date Taking? Authorizing Provider   amitriptyline (ELAVIL) 50 mg tablet Take 1.5 tablets (75 mg total) by mouth daily at bedtime 8/12/24  Yes ARIADNA Gtz   ergocalciferol (ERGOCALCIFEROL) 1.25 MG (29269 UT) capsule Take 1 capsule (50,000 Units total) by mouth 2 (two) times a week Mondays and Thursdays 2/26/24  Yes Juan Carlos Ricketts DO   escitalopram (LEXAPRO) 20 mg tablet 20 mg daily at bedtime 3/14/23  Yes Historical Provider, MD   potassium chloride 0.4 mEq/mL Inject 200 mL (80 mEq total) into a catheter in a vein over 8 hours at 25 mL/hr daily 80 meq in 1 litre bag to be infused daily over 8 hours   Continue as ordered prior to admission 5/26/23  Yes Pebbles Jiménez MD   vitamin A 3 MG (04697 UT) capsule Take 10,000 Units by mouth 2 (two) times a day   Yes Historical Provider, MD   vitamin k 100 MCG tablet Take 1 tablet by mouth daily 6/20/23  Yes Historical Provider, MD   acetaminophen (TYLENOL) 325 mg tablet  6/16/23   Historical Provider, MD   chlorhexidine (PERIDEX) 0.12 % solution  7/24/24   Historical Provider, MD   cyanocobalamin 1,000 mcg/mL Inject 1 mL (1,000 mcg total) into a muscle every 30 (thirty) days Next dose due 5/1/2023  Patient taking differently: Inject 1,000 mcg into a muscle every 30 (thirty) days Next dose due 9/1/24 4/18/23   Pallavi Argueta MD   meclizine (ANTIVERT) 12.5 MG tablet Take 1 tablet (12.5 mg total) by mouth 3 (three) times a day as needed for dizziness Do not take daily 8/12/24   ARIADNA Gtz   ondansetron (ZOFRAN-ODT) 4 mg disintegrating tablet Take 4 mg by mouth every 6 (six) hours as needed for nausea or vomiting 6/12/23   Historical Provider, MD  "  SUMAtriptan Succinate 6 MG/0.5ML SOAJ Inject 0.5 mL (6 mg total) under the skin as needed (migraine) May repeat once in 1 hour if needed. Max 12 mg/day, Max 3 days per week 7/31/24   ARIADNA Gtz   Syringe/Needle, Disp, (SYRINGE 3CC/25GX5/8\") 25G X 5/8\" 3 ML MISC Use once monthly for B12 injection. 6/1/20   Juan Carlos Ricketts,    ferrous sulfate 324 (65 Fe) mg Take 1 tablet (324 mg total) by mouth 2 (two) times a day before meals 3/13/24 8/23/24  Dominik Eldridge MD     I have reviewed home medications with patient personally.    Allergies:   Allergies   Allergen Reactions    Nsaids Other (See Comments)     Other reaction(s): Other (Please comment)  Should not take s/p bariatric surgery  Other reaction(s): Other (See Comments)  Should not take as per prior records  Should not take s/p bariatric surgery  Has hx of gastric bypass      Prednisone      Nausea, vomiting, diarrhea       Social History:  Marital Status: /Civil Union   Occupation: N/A  Patient Pre-hospital Living Situation: Home  Patient Pre-hospital Level of Mobility: walks  Patient Pre-hospital Diet Restrictions: None  Substance Use History:   Social History     Substance and Sexual Activity   Alcohol Use Not Currently     Social History     Tobacco Use   Smoking Status Never   Smokeless Tobacco Never     Social History     Substance and Sexual Activity   Drug Use Not Currently    Types: Marijuana    Comment: Medical marijuana       Family History:  Family History   Problem Relation Age of Onset    No Known Problems Mother     Hypertension Father     Diabetes Father     Urolithiasis Father     Prostate cancer Father     Diabetes Maternal Grandfather        Physical Exam:     Vitals:   Blood Pressure: 120/72 (08/23/24 1428)  Pulse: 75 (08/23/24 1428)  Temperature: 97.7 °F (36.5 °C) (08/23/24 1428)  Temp Source: Temporal (08/23/24 1428)  Respirations: 18 (08/23/24 1428)  SpO2: 100 % (08/23/24 1428)    Physical " Exam  Constitutional:       General: She is not in acute distress.  HENT:      Mouth/Throat:      Mouth: Mucous membranes are moist.   Eyes:      Conjunctiva/sclera: Conjunctivae normal.   Cardiovascular:      Heart sounds: Normal heart sounds.   Pulmonary:      Breath sounds: Normal breath sounds.   Abdominal:      Palpations: Abdomen is soft.   Skin:     General: Skin is warm and dry.   Neurological:      General: No focal deficit present.      Sensory: Sensory deficit (paraesthesias to the R arm and face) present.      Motor: No weakness.      Gait: Gait normal.          Additional Data:     Lab Results:  Results from last 7 days   Lab Units 08/23/24  1452   WBC Thousand/uL 7.91   HEMOGLOBIN g/dL 11.0*   HEMATOCRIT % 33.9*   PLATELETS Thousands/uL 339     Results from last 7 days   Lab Units 08/23/24  0730 08/21/24  0751   SODIUM mmol/L 134* 135   POTASSIUM mmol/L 2.5* 3.0*   CHLORIDE mmol/L 96 98   CO2 mmol/L 27 21   BUN mg/dL 20 22   CREATININE mg/dL 1.26 1.19   ANION GAP mmol/L 11 16*   CALCIUM mg/dL 9.0 8.9   GLUCOSE RANDOM mg/dL  --  76             Lab Results   Component Value Date    HGBA1C 5.2 02/19/2021    HGBA1C 5.4 10/04/2018           Lines/Drains:  Invasive Devices       Central Venous Catheter Line  Duration             Port A Cath 02/09/24 Left Chest 195 days                    Central Line:  Goal for removal: Port accessed. Will de-access as appropriate.           Imaging: No pertinent imaging reviewed.  No orders to display       EKG and Other Studies Reviewed on Admission:   EKG: No EKG obtained. EKG ordered     ** Please Note: This note has been constructed using a voice recognition system. **

## 2024-08-23 NOTE — ASSESSMENT & PLAN NOTE
2/2 gastric bypass surgery   Has required IV iron infusions in the past   Hgb on labs today stable at 11  Will order iron panel - pending results, will start IV venofer x3 doses

## 2024-08-23 NOTE — ASSESSMENT & PLAN NOTE
History of in 2014. Ongoing electrolyte imbalances since 2016.  Continue home vitamin A, and vitamin D  Next B12 injection due 9/1/24  Avoid NSAIDs

## 2024-08-23 NOTE — ASSESSMENT & PLAN NOTE
Patient presented to the ED after being sent by her nephrologist for abnormal labs this morning - found to have potassium of 2.5  Symptomatic with numbness/paraesthesias on R arm, and face  Has been hospitalized for similar in the past   Follows outpatient [facility] nephrology for electrolyte derangements   Utilizes port a cath for potassium supplementation (L chest) - home regimen: 80 mEq daily   Denies vomiting/diarrhea     Plan:   Start potassium supplementation 40 mEq q8 hours x4 doses per nephro recs   Nephrology consulted, appreciate recs   Continue to monitor BMP q8  EKG ordered  Monitor on telemetry   Notify provider for K <3, or >5

## 2024-08-24 VITALS
TEMPERATURE: 97 F | SYSTOLIC BLOOD PRESSURE: 109 MMHG | OXYGEN SATURATION: 98 % | RESPIRATION RATE: 18 BRPM | DIASTOLIC BLOOD PRESSURE: 70 MMHG | HEART RATE: 77 BPM

## 2024-08-24 LAB
ANION GAP SERPL CALCULATED.3IONS-SCNC: 9 MMOL/L (ref 4–13)
BUN SERPL-MCNC: 20 MG/DL (ref 5–25)
CALCIUM SERPL-MCNC: 8.9 MG/DL (ref 8.4–10.2)
CHLORIDE SERPL-SCNC: 99 MMOL/L (ref 96–108)
CO2 SERPL-SCNC: 23 MMOL/L (ref 21–32)
CORTIS AM PEAK SERPL-MCNC: 13.6 UG/DL (ref 6.7–22.6)
CREAT SERPL-MCNC: 1.18 MG/DL (ref 0.6–1.3)
GFR SERPL CREATININE-BSD FRML MDRD: 57 ML/MIN/1.73SQ M
GLUCOSE SERPL-MCNC: 92 MG/DL (ref 65–140)
HEMOCCULT STL QL: NEGATIVE
MAGNESIUM SERPL-MCNC: 2.3 MG/DL (ref 1.9–2.7)
MAGNESIUM SERPL-MCNC: 2.3 MG/DL (ref 1.9–2.7)
POTASSIUM SERPL-SCNC: 3.8 MMOL/L (ref 3.5–5.3)
SODIUM SERPL-SCNC: 131 MMOL/L (ref 135–147)

## 2024-08-24 PROCEDURE — 99238 HOSP IP/OBS DSCHRG MGMT 30/<: CPT

## 2024-08-24 PROCEDURE — 80048 BASIC METABOLIC PNL TOTAL CA: CPT

## 2024-08-24 PROCEDURE — 82533 TOTAL CORTISOL: CPT | Performed by: INTERNAL MEDICINE

## 2024-08-24 PROCEDURE — 83735 ASSAY OF MAGNESIUM: CPT

## 2024-08-24 PROCEDURE — 84244 ASSAY OF RENIN: CPT | Performed by: INTERNAL MEDICINE

## 2024-08-24 PROCEDURE — 82088 ASSAY OF ALDOSTERONE: CPT | Performed by: INTERNAL MEDICINE

## 2024-08-24 PROCEDURE — 99233 SBSQ HOSP IP/OBS HIGH 50: CPT

## 2024-08-24 PROCEDURE — NC001 PR NO CHARGE

## 2024-08-24 RX ORDER — SPIRONOLACTONE 25 MG/1
12.5 TABLET ORAL DAILY
Qty: 15 TABLET | Refills: 0 | Status: SHIPPED | OUTPATIENT
Start: 2024-08-25

## 2024-08-24 RX ORDER — POTASSIUM CHLORIDE 29.8 MG/ML
80 INJECTION INTRAVENOUS DAILY
Qty: 5600 ML | Refills: 0 | Status: SHIPPED | OUTPATIENT
Start: 2024-08-24 | End: 2024-08-26 | Stop reason: SDUPTHER

## 2024-08-24 RX ADMIN — POTASSIUM CHLORIDE 20 MEQ: 14.9 INJECTION, SOLUTION INTRAVENOUS at 08:18

## 2024-08-24 RX ADMIN — IRON SUCROSE 200 MG: 20 INJECTION, SOLUTION INTRAVENOUS at 13:13

## 2024-08-24 RX ADMIN — Medication 10000 UNITS: at 08:18

## 2024-08-24 RX ADMIN — SPIRONOLACTONE 12.5 MG: 25 TABLET ORAL at 08:18

## 2024-08-24 RX ADMIN — POTASSIUM CHLORIDE 20 MEQ: 14.9 INJECTION, SOLUTION INTRAVENOUS at 10:36

## 2024-08-24 RX ADMIN — CYANOCOBALAMIN TAB 500 MCG 500 MCG: 500 TAB at 09:40

## 2024-08-24 NOTE — NURSING NOTE
Reviewed discharge instructions, med bisi follow up appointments, worsening s/s numbness tinging (low potassium levels) verbally understood

## 2024-08-24 NOTE — ASSESSMENT & PLAN NOTE
Patient presented to the ED after being sent by her nephrologist for abnormal labs this morning - found to have potassium of 2.5  Symptomatic with numbness/paraesthesias on R arm, and face  Has been hospitalized for similar in the past   Follows outpatient [facility] nephrology for electrolyte derangements   Utilizes port a cath for potassium supplementation (L chest) - home regimen: 80 mEq daily HS  Denies vomiting/diarrhea     Plan:   Potasium supplementation per nephrology - total of 80 mEq today   Nephrology consulted, appreciate recs   Continue to monitor BMP q8  Monitor on telemetry   Notify provider for K <3, or >5    Lab Results   Component Value Date    K 2.9 (L) 08/23/2024    K 2.5 (L) 08/23/2024    K 3.0 (L) 08/21/2024

## 2024-08-24 NOTE — DISCHARGE SUMMARY
[facility]  Discharge- Brittani Patino 1985, 39 y.o. female MRN: [id number]  Unit/Bed#: -Dot Encounter: [id number]  Primary Care Provider: Horace Vasquez MD   Date and time admitted to hospital: 8/23/2024 12:47 PM    * Hypokalemia  Assessment & Plan  Patient presented to the ED after being sent by her nephrologist for abnormal labs this morning - found to have potassium of 2.5  Symptomatic with numbness/paraesthesias on R arm, and face  Has been hospitalized for similar in the past   Follows outpatient [facility] nephrology for electrolyte derangements   Utilizes port a cath for potassium supplementation (L chest) - home regimen: 80 mEq daily HS  Denies vomiting/diarrhea     Plan:   Potasium supplementation per nephrology - total of 80 mEq today   Nephrology consulted, appreciate recs   Continue to monitor BMP q8  Monitor on telemetry   Notify provider for K <3, or >5    Lab Results   Component Value Date    K 3.8 08/24/2024    K 2.9 (L) 08/23/2024    K 2.5 (L) 08/23/2024     Ha improved and nephrology has cleared for dc with resuming her home potassium as scheduled tonight     Iron deficiency  Assessment & Plan  2/2 gastric bypass surgery   Has required IV iron infusions in the past   Hgb on labs today stable at 11  Will order iron panel   Iron sat 5 with iron 20 and b12 low   IV venofer ordered     History of gastric bypass  Assessment & Plan  History of in 2014. Ongoing electrolyte imbalances since 2016.  Continue home vitamin A, and vitamin D  Next B12 injection due 9/1/24  Avoid NSAIDs           Medical Problems       Resolved Problems  Date Reviewed: 8/23/2024   None       Discharging Physician / Practitioner: Kenia Amaya PA-C  PCP: Horace Vasquez MD  Admission Date:   Admission Orders (From admission, onward)       Ordered        08/23/24 1435  Inpatient Admission  Once                          Discharge Date: 08/24/24    Consultations During Hospital Stay:  Nephrology      Procedures Performed:   None    Significant Findings / Test Results:   Lab Results   Component Value Date    K 3.8 08/24/2024    K 2.9 (L) 08/23/2024    K 2.5 (L) 08/23/2024       Incidental Findings:   None     Test Results Pending at Discharge (will require follow up):   None     Outpatient Tests Requested:  None    Complications:  None    Reason for Admission:     Hospital Course:   Brittani Patino is a 39 y.o. female patient with a PMH of gastric bypass, chronic hypokalemia, iron deficiency anemia, B12 deficiency, chronic pancreatitis who originally presented to the hospital on 8/23/2024 due to low potassium as OP. Potassium was 2.5 and was given replacement along with nephrology consult. This improved after IV potassium and she is now cleared for discharge after improvement.    The patient, initially admitted to the hospital as inpatient, was discharged earlier than expected given the following: resolution of hyponatremia .    Please see above list of diagnoses and related plan for additional information.     Condition at Discharge: stable    Discharge Day Visit / Exam:   * Please refer to separate progress note for these details *    Discussion with Family: Patient declined call to .     Discharge instructions/Information to patient and family:   See after visit summary for information provided to patient and family.      Provisions for Follow-Up Care:  See after visit summary for information related to follow-up care and any pertinent home health orders.      Mobility at time of Discharge:   Basic Mobility Inpatient Raw Score: 24  JH-HLM Goal: 8: Walk 250 feet or more  JH-HLM Achieved: 8: Walk 250 feet ot more  HLM Goal achieved. Continue to encourage appropriate mobility.     Disposition:   Home    Planned Readmission: None     Discharge Statement:  I spent 20 minutes discharging the patient. This time was spent on the day of discharge. I had direct contact with the patient on the day of  discharge. Greater than 50% of the total time was spent examining patient, answering all patient questions, arranging and discussing plan of care with patient as well as directly providing post-discharge instructions.  Additional time then spent on discharge activities.    Discharge Medications:  See after visit summary for reconciled discharge medications provided to patient and/or family.      **Please Note: This note may have been constructed using a voice recognition system**

## 2024-08-24 NOTE — PLAN OF CARE
Problem: METABOLIC, FLUID AND ELECTROLYTES - ADULT  Goal: Electrolytes maintained within normal limits  Description: INTERVENTIONS:  - Monitor labs and assess patient for signs and symptoms of electrolyte imbalances potassium levels  - Administer electrolyte replacement as ordered  - Monitor response to electrolyte replacements, including repeat lab results as appropriate  - Instruct patient on fluid and nutrition as appropriate  Outcome: Progressing

## 2024-08-24 NOTE — ASSESSMENT & PLAN NOTE
Patient presented to the ED after being sent by her nephrologist for abnormal labs this morning - found to have potassium of 2.5  Symptomatic with numbness/paraesthesias on R arm, and face  Has been hospitalized for similar in the past   Follows outpatient [facility] nephrology for electrolyte derangements   Utilizes port a cath for potassium supplementation (L chest) - home regimen: 80 mEq daily HS  Denies vomiting/diarrhea     Plan:   Potasium supplementation per nephrology - total of 80 mEq today   Nephrology consulted, appreciate recs   Continue to monitor BMP q8  Monitor on telemetry   Notify provider for K <3, or >5    Lab Results   Component Value Date    K 3.8 08/24/2024    K 2.9 (L) 08/23/2024    K 2.5 (L) 08/23/2024     Ha improved and nephrology has cleared for dc with resuming her home potassium as scheduled tonight    negative...

## 2024-08-24 NOTE — ASSESSMENT & PLAN NOTE
2/2 gastric bypass surgery   Has required IV iron infusions in the past   Hgb on labs today stable at 11  Will order iron panel   Iron sat 5 with iron 20 and b12 low   IV venofer ordered

## 2024-08-24 NOTE — PROGRESS NOTES
VIRTUAL CARE DOCUMENTATION:     1. This service was provided via Telemedicine using [employer]     2. Parties in the room with patient during teleconsult Patient only    3. Confidentiality My office door was closed     4. Participants No one else was in the room    5. Patient acknowledged consent and understanding of privacy and security of the  Telemedicine consult. I informed the patient that I have reviewed their record in Epic and presented the opportunity for them to ask any questions regarding the visit today.  The patient agreed to participate.    6. Time spent 50 minutes         NEPHROLOGY PROGRESS NOTE   Brittani Patino 39 y.o. female MRN: [id number]  Unit/Bed#: -01 Encounter: [id number]      HPI/24hr EVENTS:    - 39 year old female with a history of refractory hypokalemia, gastric bypass, nephrolithiasis, chronic pancreatitis and chronic anemia who presents for worsening hypokalemia. Nephrology was consulted for the management of worsening hypokalemia.     -Patient seen and examined today.  She still reports numbness and tingling.  She denies chest pain, shortness of breath, headaches, dizziness, nausea, vomiting, diarrhea, constipation, abdominal pain.  She admits to normal urination.  She denies decreased appetite.    ASSESSMENT/PLAN:    Hypokalemia  Patient has a history of chronic refractory hypokalemia and has been receiving daily infusions of IV potassium chloride 80 mEq as she sleeps. Most recently on 8/23 her potassium level was 2.5 accompanied with numbness/tingling in her face and feet prompting admission. In terms of recent workup urine potassium to creatinine ratio and TTKG are both normal suggesting absence of renal potassium wasting. Plasma renin and aldosterone are being checked as well as a screen for hyperaldosteronism. It is thought that the patient loses the potassium in her stools. She was started on IV KCL 40 mEq twice daily and spironolactone 12.5 mg daily and most recent  potassium levels are improving to 2.9 mmol/L. Continue with current therapy. Check BMP every 8 hours.     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 Creatinine remains stable at baseline.   -Follows with Dr Ricketts as OP    Hyponatremia  Most recent serum sodium level decreasing to 133 mmol/L, which is still mild. Workup revealed urine Na less than 10, serum osm 279, urine osm 258, TSH 2.342, and am cortisol is pending. This is a true hypotonic hyponatremia which may be related to decreased solute intake. Continue to monitor at this time. If continues to decrease will start the patient on a fluid restriction. Encouraged increased solute intake.     Vitamin D deficiency  Continue with ergocalciferol 50,000 units twice weekly     Anemia  Iron studies show an iron deficiency anemia with an iron saturation of 5%. Recommend IV venofer dosing at discretion of primary team. Continue with B12 supplementation.       ROS:  Review of Systems   A complete 10 point review of systems was performed and found to be negative unless otherwise noted above or in the HPI.    OBJECTIVE:  Current Weight:    Vitals:    08/23/24 1254 08/23/24 1428 08/23/24 1936   BP: 134/78 120/72 117/70   Pulse: 98 75 78   Resp: 18 18    Temp: (!) 97.2 °F (36.2 °C) 97.7 °F (36.5 °C) 97.5 °F (36.4 °C)   TempSrc:  Temporal    SpO2: 98% 100% 97%     No intake or output data in the 24 hours ending 08/24/24 0511  Physical Exam  Vitals and nursing note reviewed.   Constitutional:       Appearance: Normal appearance. She is not ill-appearing or toxic-appearing.   HENT:      Head: Normocephalic and atraumatic.      Mouth/Throat:      Mouth: Mucous membranes are moist.   Eyes:      General: No scleral icterus.        Right eye: No discharge.         Left eye: No discharge.   Pulmonary:      Effort: Pulmonary effort is normal. No respiratory distress.   Abdominal:      General: Abdomen is flat. There is no distension.      Tenderness: There is no  abdominal tenderness.   Musculoskeletal:         General: No swelling.      Right lower leg: No edema.      Left lower leg: No edema.   Skin:     General: Skin is warm and dry.   Neurological:      Mental Status: She is alert and oriented to person, place, and time.   Psychiatric:         Mood and Affect: Mood normal.         Behavior: Behavior normal.          Medications:    Current Facility-Administered Medications:     amitriptyline (ELAVIL) tablet 75 mg, 75 mg, Oral, HS, Karmen Torres PA-C, 75 mg at 08/23/24 2141    enoxaparin (LOVENOX) subcutaneous injection 40 mg, 40 mg, Subcutaneous, Daily, Karmen Torres PA-C    [START ON 8/26/2024] ergocalciferol (VITAMIN D2) capsule 50,000 Units, 50,000 Units, Oral, Once per day on Monday Thursday, Karmen Torres PA-C    escitalopram (LEXAPRO) tablet 20 mg, 20 mg, Oral, HS, Karmen Torres PA-C, 20 mg at 08/23/24 2141    meclizine (ANTIVERT) tablet 12.5 mg, 12.5 mg, Oral, TID PRN, Karmen Torres PA-C    ondansetron (ZOFRAN-ODT) dispersible tablet 4 mg, 4 mg, Oral, Q6H PRN, Karmen Torres PA-C    potassium chloride 20 mEq IVPB (premix), 20 mEq, Intravenous, Q2H, Pallavi Argueta MD    potassium chloride 20 mEq IVPB (premix), 20 mEq, Intravenous, Q2H, Pallavi Argueta MD    spironolactone (ALDACTONE) tablet 12.5 mg, 12.5 mg, Oral, Daily, Flaco Martinez DO    SUMAtriptan (IMITREX) subcutaneous injection 6 mg, 6 mg, Subcutaneous, Once PRN, Karmen Torres PA-C    vitamin A capsule 10,000 Units, 10,000 Units, Oral, BID, Karmen Torres PA-C, 10,000 Units at 08/23/24 1737    Laboratory Results:  Results from last 7 days   Lab Units 08/23/24  2303 08/23/24  1452 08/23/24  0730 08/21/24  0751 08/19/24  0728   WBC Thousand/uL  --  7.91  --   --   --    HEMOGLOBIN g/dL  --  11.0*  --   --   --    HEMATOCRIT %  --  33.9*  --   --   --    PLATELETS Thousands/uL  --  339  --   --   --    POTASSIUM mmol/L 2.9*  --  2.5* 3.0* 2.9*   CHLORIDE mmol/L 100  --  96 98  100   CO2 mmol/L 25  --  27 21 23   BUN mg/dL 22  --  20 22 20   CREATININE mg/dL 1.16  --  1.26 1.19 1.28   CALCIUM mg/dL 8.2*  --  9.0 8.9 9.0   MAGNESIUM mg/dL 2.3  --   --   --   --        I have personally reviewed the blood work as stated above and in my note.  I have personally reviewed internal medicine note.

## 2024-08-24 NOTE — PROGRESS NOTES
[facility]  Progress Note  Name: Brittani Patino I  MRN: [id number]  Unit/Bed#: -01 I Date of Admission: 8/23/2024   Date of Service: 8/24/2024 I Hospital Day: 1    Assessment & Plan   * Hypokalemia  Assessment & Plan  Patient presented to the ED after being sent by her nephrologist for abnormal labs this morning - found to have potassium of 2.5  Symptomatic with numbness/paraesthesias on R arm, and face  Has been hospitalized for similar in the past   Follows outpatient [facility] nephrology for electrolyte derangements   Utilizes port a cath for potassium supplementation (L chest) - home regimen: 80 mEq daily HS  Denies vomiting/diarrhea     Plan:   Potasium supplementation per nephrology - total of 80 mEq today   Nephrology consulted, appreciate recs   Continue to monitor BMP q8  Monitor on telemetry   Notify provider for K <3, or >5    Lab Results   Component Value Date    K 2.9 (L) 08/23/2024    K 2.5 (L) 08/23/2024    K 3.0 (L) 08/21/2024         Iron deficiency  Assessment & Plan  2/2 gastric bypass surgery   Has required IV iron infusions in the past   Hgb on labs today stable at 11  Will order iron panel   Iron sat 5 with iron 20 and b12 low   IV venofer ordered     History of gastric bypass  Assessment & Plan  History of in 2014. Ongoing electrolyte imbalances since 2016.  Continue home vitamin A, and vitamin D  Next B12 injection due 9/1/24  Avoid NSAIDs                VTE Pharmacologic Prophylaxis:   Moderate Risk (Score 3-4) - Pharmacological DVT Prophylaxis Ordered: enoxaparin (Lovenox).    Mobility:   Basic Mobility Inpatient Raw Score: 24  JH-HLM Goal: 8: Walk 250 feet or more  JH-HLM Achieved: 8: Walk 250 feet ot more  JH-HLM Goal achieved. Continue to encourage appropriate mobility.    Patient Centered Rounds: I performed bedside rounds with nursing staff today.   Discussions with Specialists or Other Care Team Provider: RODOLFO, nephrology note reviewed     Education and  Discussions with Family / Patient: Patient declined call to .     Total Time Spent on Date of Encounter in care of patient:  mins. This time was spent on one or more of the following: performing physical exam; counseling and coordination of care; obtaining or reviewing history; documenting in the medical record; reviewing/ordering tests, medications or procedures; communicating with other healthcare professionals and discussing with patient's family/caregivers.    Current Length of Stay: 1 day(s)  Current Patient Status: Inpatient   Certification Statement: The patient will continue to require additional inpatient hospital stay due to hypokalemia   Discharge Plan: Anticipate discharge later today or tomorrow to home.    Code Status: Level 1 - Full Code    Subjective:   Seen and examined.  Overall symptoms have improved, starting to feel improved.     Objective:     Vitals:   Temp (24hrs), Av.4 °F (36.3 °C), Min:97.2 °F (36.2 °C), Max:97.7 °F (36.5 °C)    Temp:  [97.2 °F (36.2 °C)-97.7 °F (36.5 °C)] 97.3 °F (36.3 °C)  HR:  [74-98] 74  Resp:  [18-19] 19  BP: (115-134)/(70-78) 115/70  SpO2:  [97 %-100 %] 97 %  There is no height or weight on file to calculate BMI.     Input and Output Summary (last 24 hours):     Intake/Output Summary (Last 24 hours) at 2024 1107  Last data filed at 2024 0924  Gross per 24 hour   Intake 480 ml   Output --   Net 480 ml       Physical Exam:   Physical Exam  Vitals and nursing note reviewed.   Constitutional:       General: She is not in acute distress.     Appearance: Normal appearance.   HENT:      Head: Normocephalic and atraumatic.      Nose: No congestion.      Mouth/Throat:      Mouth: Mucous membranes are moist.   Eyes:      Conjunctiva/sclera: Conjunctivae normal.   Cardiovascular:      Rate and Rhythm: Normal rate and regular rhythm.      Pulses: Normal pulses.      Heart sounds: Normal heart sounds. No murmur heard.  Pulmonary:      Effort: Pulmonary  effort is normal. No respiratory distress.      Breath sounds: Normal breath sounds.   Abdominal:      General: Bowel sounds are normal.      Palpations: Abdomen is soft.      Tenderness: There is no abdominal tenderness.   Musculoskeletal:         General: Normal range of motion.      Right lower leg: No edema.      Left lower leg: No edema.   Skin:     General: Skin is warm and dry.   Neurological:      Mental Status: She is alert and oriented to person, place, and time.          Additional Data:     Labs:  Results from last 7 days   Lab Units 08/23/24  1452   WBC Thousand/uL 7.91   HEMOGLOBIN g/dL 11.0*   HEMATOCRIT % 33.9*   PLATELETS Thousands/uL 339     Results from last 7 days   Lab Units 08/23/24  2303   SODIUM mmol/L 133*   POTASSIUM mmol/L 2.9*   CHLORIDE mmol/L 100   CO2 mmol/L 25   BUN mg/dL 22   CREATININE mg/dL 1.16   ANION GAP mmol/L 8   CALCIUM mg/dL 8.2*   GLUCOSE RANDOM mg/dL 95                       Lines/Drains:  Invasive Devices       Central Venous Catheter Line  Duration             Port A Cath 02/09/24 Left Chest 196 days                    Central Line:  Goal for removal:  Discharging with line for IV potassium          Telemetry:  Telemetry Orders (From admission, onward)               24 Hour Telemetry Monitoring  Continuous x 24 Hours (Telem)        Question:  Reason for 24 Hour Telemetry  Answer:  Metabolic/electrolyte disturbance with high probability of dysrhythmia. K level <3 or >6 OR KCL infusion >10mEq/hr                     Telemetry Reviewed: Normal Sinus Rhythm  Indication for Continued Telemetry Use: Metabolic/electrolyte disturbance with high probability of dysrhythmia             Imaging: No pertinent imaging reviewed.    Recent Cultures (last 7 days):         Last 24 Hours Medication List:   Current Facility-Administered Medications   Medication Dose Route Frequency Provider Last Rate    amitriptyline  75 mg Oral HS Karmen Torres PA-C      cyanocobalamin  500 mcg Oral  Daily Kenia Amaya PA-C      enoxaparin  40 mg Subcutaneous Daily Karmen Torres PA-C      [START ON 8/26/2024] ergocalciferol  50,000 Units Oral Once per day on Monday Thursday Karmen Torres PA-C      escitalopram  20 mg Oral HS Karmen Torres PA-C      iron sucrose  200 mg Intravenous Once Kenia Amaya PA-C      meclizine  12.5 mg Oral TID PRN Karmen Torres PA-C      ondansetron  4 mg Oral Q6H PRN Karmen Torres PA-C      potassium chloride  20 mEq Intravenous Q2H Pallavi Argueta MD 20 mEq (08/24/24 1036)    potassium chloride  20 mEq Intravenous Q2H Pallavi Argueta MD      spironolactone  12.5 mg Oral Daily Flaco Martinez DO      SUMAtriptan  6 mg Subcutaneous Once PRN Karmen Torres PA-C      vitamin A  10,000 Units Oral BID Karmen Torres PA-C          Today, Patient Was Seen By: Kenia Amaya PA-C    **Please Note: This note may have been constructed using a voice recognition system.**

## 2024-08-24 NOTE — DISCHARGE INSTR - AVS FIRST PAGE
Dear Brittani Patino,     It was our pleasure to care for you here at [facility]. For follow up as well as any medication refills, we recommend that you follow up with your primary care physician. Here are the most important instructions/ recommendations at discharge:     Notable Medication Adjustments -   Continue taking your potassium as previously prescribed   Start spironolactone   Important follow up information -   Follow up with nephrology   Please review this entire after visit summary as additional general instructions including medication list, appointments, activity, diet, any pertinent wound care, and other additional recommendations from your care team that may be provided for you.      Sincerely,     Kenia Amaya PA-C

## 2024-08-24 NOTE — CASE MANAGEMENT
Case Management Assessment & Discharge Planning Note    Patient name Brittani Patino  Location /-01 MRN [id number]  : 1985 Date 2024       Current Admission Date: 2024  Current Admission Diagnosis:Hypokalemia   Patient Active Problem List    Diagnosis Date Noted Date Diagnosed    Magnesium deficiency 2024     Iron deficiency anemia due to dietary causes 2023     Nephrolithiasis 2023     Enterocolitis 2023     Intussusception (HCC) 2023     Leukocytosis 2023     Left ureteral calculus 2023     History of intussusception 10/20/2022 06/10/2023    Idiopathic acute pancreatitis without infection or necrosis 2022     Perianal abscess 2022     Episode of shaking      Psychogenic nonepileptic spells 2022     History of anxiety 2022     Intractable vomiting 2022     Hyponatremia 2022     Iron deficiency 2021     Other hemorrhoids      Open wound / skin tear of right chest wall 2021     Cellulitis of chest wall 2021     Acute pancreatitis 2021     Acute on chronic abdominal pain 2021     Abnormal LFTs 2021     Gas pain 2021     Middle ear effusion 2020     Normal anion gap metabolic acidosis 2020     Left upper quadrant abdominal pain 2019     Vertigo 2019     Stress fracture of right foot with routine healing 2019     Right ovarian cyst 2019     Chest pain 2019     Other intestinal malabsorption 10/05/2018     Gastric bypass status for obesity 10/02/2018     GERD (gastroesophageal reflux disease) 2018     Paresthesia of both lower extremities 08/15/2018     Paresthesias 08/15/2018     Fatigue 2018     Elevated CPK 2018     Vitamin B12 deficiency 2018     Cervical lymphadenopathy 2018     Acute on chronic anemia 07/10/2018     Vitamin D insufficiency 07/10/2018     Hypocalcemia 2018      Hypophosphatemia 07/09/2018     Hypokalemia 07/07/2018     History of gastric bypass 07/07/2018     Migraine without aura and without status migrainosus, not intractable 07/07/2018     Left-sided weakness 07/07/2018     Transaminitis 07/07/2018     B12 deficiency 01/17/2016     Essential hypertension 10/14/2014 06/10/2023    Chronic migraine without aura without status migrainosus, not intractable 05/29/2014       LOS (days): 1  Geometric Mean LOS (GMLOS) (days):   Days to GMLOS:     OBJECTIVE:    Risk of Unplanned Readmission Score: 14.54         Current admission status: Inpatient  Referral Reason: Other (dc planning)    Preferred Pharmacy:   [facility] [place] [address] - [address] [address]  Phone: [phone] Fax: [phone]    Primary Care Provider: Horace Vasquez MD    Primary Insurance: [employer]  Secondary Insurance:     CM met with patient at the bedside,baseline information  was obtained. CM discussed the role of CM in helping the patient develop a discharge plan and assist the patient in carry out their plan.      ASSESSMENT:  Active Health Care Proxies    There are no active Health Care Proxies on file.       Advance Directives  Does patient have a Health Care POA?: No  Was patient offered paperwork?: No  Does patient have Advance Directives?: No  Was patient offered paperwork?: No  Primary Contact: Brandt Patino (Spouse)  [phone]         Readmission Root Cause  30 Day Readmission: No    Patient Information  Admitted from:: Home  Mental Status: Alert  During Assessment patient was accompanied by: Not accompanied during assessment  Assessment information provided by:: Patient  Primary Caregiver: Self  Support Systems: Spouse/significant other, Children (children are 11/ 15)  County of Residence: [facility]  What city do you live in?: [place]  Home entry access options. Select all that apply.: No steps to enter home  Type of  Current Residence: [facility]  Living Arrangements: Lives w/ Spouse/significant other (lives with children)  Is patient a ?: No    Activities of Daily Living Prior to Admission  Functional Status: Independent  Completes ADLs independently?: Yes  Ambulates independently?: Yes  Does patient use assisted devices?: No  Does patient currently own DME?: No  Does patient have a history of Outpatient Therapy (PT/OT)?: No  Does the patient have a history of Short-Term Rehab?: No  Does patient have a history of HHC?: Yes  Does patient currently have HHC?: No         Patient Information Continued  Income Source: Unemployed  Does patient have prescription coverage?: Yes  Does patient receive dialysis treatments?: No  Does patient have a history of substance abuse?: No  Does patient have a history of Mental Health Diagnosis?: No         Means of Transportation  Means of Transport to [facility] in the [place]:: Drives Self      Social Determinants of Health (SDOH)      Flowsheet Row Most Recent Value   Housing Stability    In the last 12 months, was there a time when you were not able to pay the mortgage or rent on time? N   In the past 12 months, how many times have you moved where you were living? 0   At any time in the past 12 months, were you homeless or living in a shelter (including now)? N   Transportation Needs    In the past 12 months, has lack of transportation kept you from medical appointments or from getting medications? no   In the past 12 months, has lack of transportation kept you from meetings, work, or from getting things needed for daily living? No   Food Insecurity    Within the past 12 months, you worried that your food would run out before you got the money to buy more. Never true   Within the past 12 months, the food you bought just didn't last and you didn't have money to get more. Never true   Utilities    In the past 12 months has the electric, gas, oil, or water company threatened to shut off services in your home? No             DISCHARGE DETAILS:    Discharge planning discussed with:: patient  Freedom of Choice: Yes     CM contacted family/caregiver?: No- see comments (declined)                  Requested Home Health Care         Is the patient interested in HHC at discharge?: No    DME Referral Provided  Referral made for DME?: No    Other Referral/Resources/Interventions Provided:  Interventions: Home Infusion  Referral Comments: Uses [facility] for Daily IV Potassium: Phone number -- Fax -- MD was made aware [facility] will need a resumption of order upon dc. This can be escribed. ( number is in the system)         Treatment Team Recommendation: Home  Discharge Destination Plan:: Home  Transport at Discharge : Family      Will follow for any dc needs.

## 2024-08-26 ENCOUNTER — TELEPHONE (OUTPATIENT)
Dept: NEPHROLOGY | Facility: CLINIC | Age: 39
End: 2024-08-26

## 2024-08-26 DIAGNOSIS — E87.6 HYPOKALEMIA: ICD-10-CM

## 2024-08-26 LAB
ATRIAL RATE: 73 BPM
P AXIS: 70 DEGREES
PR INTERVAL: 126 MS
QRS AXIS: 50 DEGREES
QRSD INTERVAL: 112 MS
QT INTERVAL: 406 MS
QTC INTERVAL: 447 MS
T WAVE AXIS: 78 DEGREES
VENTRICULAR RATE: 73 BPM

## 2024-08-26 PROCEDURE — 93010 ELECTROCARDIOGRAM REPORT: CPT | Performed by: INTERNAL MEDICINE

## 2024-08-26 RX ORDER — POTASSIUM CHLORIDE 29.8 MG/ML
80 INJECTION INTRAVENOUS DAILY
Qty: 5600 ML | Refills: 11 | Status: SHIPPED | OUTPATIENT
Start: 2024-08-26

## 2024-08-26 NOTE — UTILIZATION REVIEW
NOTIFICATION OF ADMISSION DISCHARGE   This is a Notification of Discharge from [facility]. Please be advised that this patient has been discharge from our facility. Below you will find the admission and discharge date and time including the patient’s disposition.   UTILIZATION REVIEW CONTACT:  Nancy Cummings  Utilization   Network Utilization Review Department  Phone: [phone] x carefully listen to the prompts. All voicemails are confidential.  Email: [email or web address]     ADMISSION INFORMATION  PRESENTATION DATE: 8/23/2024 12:47 PM  OBERVATION ADMISSION DATE: N/A  INPATIENT ADMISSION DATE: 8/23/24 12:47 PM   DISCHARGE DATE: 8/24/2024  3:45 PM   DISPOSITION:Home/Self Care    Network Utilization Review Department  ATTENTION: Please call with any questions or concerns to [phone] and carefully listen to the prompts so that you are directed to the right person. All voicemails are confidential.   For Discharge needs, contact Care Management DC Support Team at [phone] opt. 2  Send all requests for admission clinical reviews, approved or denied determinations and any other requests to dedicated fax number below belonging to the campus where the patient is receiving treatment. List of dedicated fax numbers for the Facilities:  FACILITY NAME UR FAX NUMBER   ADMISSION DENIALS (Administrative/Medical Necessity) [phone]   DISCHARGE SUPPORT TEAM ([facility]) [phone]   PARENT CHILD HEALTH (Maternity/NICU/Pediatrics) [phone]   [facility] [phone]   [facility] [phone]   [facility] [phone]   [facility] [phone]   [facility] [phone]   [facility] [phone]   [facility] [phone]   [facility]  [phone]   [facility] [phone]   [facility] [phone]   [facility] [phone]   [facility] [phone]

## 2024-08-26 NOTE — UTILIZATION REVIEW
NOTIFICATION OF INPATIENT ADMISSION   AUTHORIZATION REQUEST   SERVICING FACILITY:   [place] [address]  Tax ID: [id number]  NPI: [id number] ATTENDING PROVIDER:  Attending Name and NPI#: Pallavi Argueta Md [[id number]]  Address: [address] [place] [address]  Phone: [phone]   ADMISSION INFORMATION:  Place of Service: Inpatient [facility]  Place of Service Code: 21  Inpatient Admission Date/Time: 8/23/24 12:47 PM  Discharge Date/Time: 8/24/2024  3:45 PM  Admitting Diagnosis Code/Description:  Hypokalemia [E87.6]     UTILIZATION REVIEW CONTACT:  Nancy Cummings, Utilization   Network Utilization Review Department  Phone: [phone]  Fax [phone]  Email: [email or web address]  Contact for approvals/pending authorizations, clinical reviews, and discharge.     PHYSICIAN ADVISORY SERVICES:  Medical Necessity Denial & Uytd-gc-Bvyo Review  Phone: [phone]  Fax: [phone]  Email: [email or web address]     DISCHARGE SUPPORT TEAM:  For Patients Discharge Needs & Updates  Phone: [phone] opt. 2 Fax: [phone]  Email: [email or web address]

## 2024-08-27 NOTE — TELEPHONE ENCOUNTER
Piero from [facility] called about this.  Informed him it was e-scribed yesterday.  He said he does see the order now, nothing further needed.

## 2024-08-28 LAB — ALDOST SERPL-MCNC: 22.5 NG/DL (ref 0–30)

## 2024-09-01 LAB — RENIN PLAS-CCNC: 4.9 NG/ML/HR (ref 0.17–5.38)

## 2024-09-05 ENCOUNTER — APPOINTMENT (OUTPATIENT)
Dept: LAB | Facility: CLINIC | Age: 39
End: 2024-09-05
Payer: COMMERCIAL

## 2024-09-05 DIAGNOSIS — E87.6 HYPOKALEMIA: ICD-10-CM

## 2024-09-05 LAB
ANION GAP SERPL CALCULATED.3IONS-SCNC: 8 MMOL/L (ref 4–13)
BUN SERPL-MCNC: 14 MG/DL (ref 5–25)
CALCIUM SERPL-MCNC: 8 MG/DL (ref 8.4–10.2)
CHLORIDE SERPL-SCNC: 109 MMOL/L (ref 96–108)
CO2 SERPL-SCNC: 24 MMOL/L (ref 21–32)
CREAT SERPL-MCNC: 0.95 MG/DL (ref 0.6–1.3)
GFR SERPL CREATININE-BSD FRML MDRD: 75 ML/MIN/1.73SQ M
GLUCOSE SERPL-MCNC: 77 MG/DL (ref 65–140)
POTASSIUM SERPL-SCNC: 3.6 MMOL/L (ref 3.5–5.3)
SODIUM SERPL-SCNC: 141 MMOL/L (ref 135–147)

## 2024-09-05 PROCEDURE — 80048 BASIC METABOLIC PNL TOTAL CA: CPT

## 2024-09-05 PROCEDURE — 36415 COLL VENOUS BLD VENIPUNCTURE: CPT

## 2024-09-06 ENCOUNTER — TELEPHONE (OUTPATIENT)
Dept: NEPHROLOGY | Facility: CLINIC | Age: 39
End: 2024-09-06

## 2024-09-06 NOTE — TELEPHONE ENCOUNTER
----- Message from ARIADNA John sent at 9/6/2024  2:17 PM EDT -----  Phone call to discuss lab results, recent ED visit due to hypokalemia.  Left detailed voicemail on Brittani's number regarding potassium 3.6 and calcium 8.0.  No record of oral potassium or calcium supplements in her medication record.  Requested she call office back so medications could be sent to pharmacy today if needed.  Also attempted to contact , he answered phone and stated he would have her call when he gets home later but he was not able to discuss lab results at this time.  Advised him that I would prefer to send prescriptions today if she needs them since it is a Friday afternoon, however, he stated he is unable to talk at this time and will have her call.

## 2024-09-09 ENCOUNTER — APPOINTMENT (OUTPATIENT)
Dept: LAB | Facility: CLINIC | Age: 39
End: 2024-09-09
Payer: COMMERCIAL

## 2024-09-09 DIAGNOSIS — E87.6 HYPOKALEMIA: ICD-10-CM

## 2024-09-09 LAB
ANION GAP SERPL CALCULATED.3IONS-SCNC: 7 MMOL/L (ref 4–13)
BUN SERPL-MCNC: 13 MG/DL (ref 5–25)
CALCIUM SERPL-MCNC: 8.1 MG/DL (ref 8.4–10.2)
CHLORIDE SERPL-SCNC: 110 MMOL/L (ref 96–108)
CO2 SERPL-SCNC: 23 MMOL/L (ref 21–32)
CREAT SERPL-MCNC: 0.97 MG/DL (ref 0.6–1.3)
GFR SERPL CREATININE-BSD FRML MDRD: 73 ML/MIN/1.73SQ M
GLUCOSE SERPL-MCNC: 70 MG/DL (ref 65–140)
POTASSIUM SERPL-SCNC: 3.7 MMOL/L (ref 3.5–5.3)
SODIUM SERPL-SCNC: 140 MMOL/L (ref 135–147)

## 2024-09-09 PROCEDURE — 36415 COLL VENOUS BLD VENIPUNCTURE: CPT

## 2024-09-09 PROCEDURE — 80048 BASIC METABOLIC PNL TOTAL CA: CPT

## 2024-09-09 NOTE — TELEPHONE ENCOUNTER
I called and left a VM for Brittani to follow up if she received Nellie's VM on Friday regarding her lab results and medications possibly needing to be prescribed to her pharmacy. I asked for her to call the office back to confirm.

## 2024-09-10 ENCOUNTER — TELEPHONE (OUTPATIENT)
Age: 39
End: 2024-09-10

## 2024-09-10 NOTE — TELEPHONE ENCOUNTER
LVM for pt of Potassium level looking great   Continue with current care.  If any questions call office.     ----- Message from Juan Carlos Ricketts, DO sent at 9/10/2024 10:02 AM EDT -----  Potassium level looking great  Continue with current care

## 2024-09-16 ENCOUNTER — APPOINTMENT (OUTPATIENT)
Dept: LAB | Facility: HOSPITAL | Age: 39
End: 2024-09-16
Payer: COMMERCIAL

## 2024-09-16 DIAGNOSIS — E87.6 HYPOKALEMIA: ICD-10-CM

## 2024-09-16 LAB
ANION GAP SERPL CALCULATED.3IONS-SCNC: 7 MMOL/L (ref 4–13)
BUN SERPL-MCNC: 14 MG/DL (ref 5–25)
CALCIUM SERPL-MCNC: 8.4 MG/DL (ref 8.4–10.2)
CHLORIDE SERPL-SCNC: 111 MMOL/L (ref 96–108)
CO2 SERPL-SCNC: 22 MMOL/L (ref 21–32)
CREAT SERPL-MCNC: 1.12 MG/DL (ref 0.6–1.3)
GFR SERPL CREATININE-BSD FRML MDRD: 61 ML/MIN/1.73SQ M
GLUCOSE P FAST SERPL-MCNC: 86 MG/DL (ref 65–99)
POTASSIUM SERPL-SCNC: 4.1 MMOL/L (ref 3.5–5.3)
SODIUM SERPL-SCNC: 140 MMOL/L (ref 135–147)

## 2024-09-16 PROCEDURE — 80048 BASIC METABOLIC PNL TOTAL CA: CPT

## 2024-09-16 PROCEDURE — 36415 COLL VENOUS BLD VENIPUNCTURE: CPT

## 2024-09-20 NOTE — PROGRESS NOTES
He monitors regularly.  Today's blood pressure 130/63 mm Hg.  Current therapy to continue.   Progress Note - Nephrology   Fredy Peguero 27 y o  female MRN: [id number]  Unit/Bed#: -02 Encounter: [id number]    A/P:  1  Hypokalemia: this has been extensively worked up at three hospitals with negative results  She receives KCl infusions via a port at the [facility]  Will give additional IV KCl today   2  History of gastric bypass: she follows a strict diet that includes potassium rich foods  There was a question of malabsorption  She does not have renal tubular acidosis or laxative abuse or inherited hypokalemic disorders  3  Weakness of legs:  Stable today  4  Migraine without aura - stable today      Follow up reason for today's visit: hypokalemia    Hypokalemia    Patient Active Problem List   Diagnosis    Hypokalemia    History of gastric bypass    Migraine without aura and without status migrainosus, not intractable    Left-sided weakness    Hypophosphatemia    Anemia    Vitamin D deficiency    Weakness of both lower extremities    Weakness of both upper extremities    Elevated CPK    Vitamin B12 deficiency    Cervical lymphadenopathy    Fatigue    Paresthesia of both lower extremities    Paresthesias    B12 deficiency    Gastric bypass status for obesity    GERD (gastroesophageal reflux disease)    Migraine    WAGNER (obstructive sleep apnea)    Other intestinal malabsorption    Acute kidney injury ([facility] [address] )    Right ovarian cyst    Chest pain    Intractable nausea and vomiting    Vertigo    Stress fracture of right foot with routine healing         Subjective:   Feels well denies dizziness tremors muscle weakness chest pain shortness of breath or abdominal pain we discussed a plan to have [facility] seen at [facility] at [address] [place] [address]    Objective:     Vitals: Blood pressure 104/56, pulse 76, temperature (!) 97 1 °F (36 2 °C), temperature source Temporal, resp   rate 18, height 5' 3" (1 6 m), weight 72 kg (158 lb 11 7 oz), SpO2 99 % ,Body mass index is 28 12 kg/m²  Weight (last 2 days)     Date/Time   Weight    10/02/19 0600   72 (158 73)    10/01/19 0600   72 6 (160 05)    09/30/19 1242   71 2 (156 97)                Intake/Output Summary (Last 24 hours) at 10/2/2019 0917  Last data filed at 10/1/2019 1700  Gross per 24 hour   Intake 240 ml   Output --   Net 240 ml     I/O last 3 completed shifts: In: 240 [P O :240]  Out: -          Physical Exam: /56 (BP Location: Right arm)   Pulse 76   Temp (!) 97 1 °F (36 2 °C) (Temporal)   Resp 18   Ht 5' 3" (1 6 m)   Wt 72 kg (158 lb 11 7 oz)   SpO2 99%   BMI 28 12 kg/m²     General Appearance:    Alert, cooperative, no distress, appears stated age   Head:    Normocephalic, without obvious abnormality, atraumatic   Eyes:    Conjunctiva/corneas clear   Ears:    Normal external ears   Nose:   Nares normal, septum midline, mucosa normal, no drainage    or sinus tenderness   Throat:   Lips, mucosa, and tongue normal; teeth and gums normal   Neck:   Supple, symmetrical, trachea midline, no adenopathy;        thyroid:  No enlargement/tenderness/nodules; no carotid    bruit or JVD   Back:     Symmetric, no curvature, ROM normal, no CVA tenderness   Lungs:     Clear to auscultation bilaterally, respirations unlabored   Chest wall:    No tenderness or deformity   Heart:    Regular rate and rhythm, S1 and S2 normal, no murmur, rub   or gallop   Abdomen:     Soft, non-tender, bowel sounds active   Extremities:   Extremities normal, atraumatic, no cyanosis or edema   Skin:   Skin color, texture, turgor normal, no rashes or lesions   Lymph nodes:   Cervical normal   Neurologic:   CNII-XII intact            Lab, Imaging and other studies: I have personally reviewed pertinent labs    CBC: No results found for: WBC, HGB, HCT, MCV, PLT, ADJUSTEDWBC, MCH, MCHC, RDW, MPV, NRBC  CMP:   Lab Results   Component Value Date    K 3 3 (L) 10/02/2019     (H) 10/02/2019    CO2 19 (L) 10/02/2019    BUN 9 10/02/2019    CREATININE 0 98 10/02/2019    CALCIUM 8 3 (L) 10/02/2019    EGFR 75 10/02/2019         Results from last 7 days   Lab Units 10/02/19  0610 10/01/19  0558 09/30/19  0913   POTASSIUM mmol/L 3 3* 3 4* 3 2*   CHLORIDE mmol/L 114* 114* 107   CO2 mmol/L 19* 19* 20*   BUN mg/dL 9 9 8   CREATININE mg/dL 0 98 0 95 1 00   CALCIUM mg/dL 8 3* 8 0* 8 0*         Phosphorus: No results found for: PHOS  Magnesium: No results found for: MG  Urinalysis: No results found for: COLORU, CLARITYU, SPECGRAV, PHUR, LEUKOCYTESUR, NITRITE, PROTEINUA, GLUCOSEU, KETONESU, BILIRUBINUR, BLOODU  Ionized Calcium: No results found for: CAION  Coagulation: No results found for: PT, INR, APTT  Troponin: No results found for: TROPONINI  ABG: No results found for: PHART, WBE1SRW, PO2ART, BBG2ZPN, J6CEDEAX, BEART, SOURCE  Radiology review:     IMAGING  No results found      Current Facility-Administered Medications   Medication Dose Route Frequency    acetaminophen (TYLENOL) tablet 650 mg  650 mg Oral Q6H PRN    AMILoride tablet 5 mg  5 mg Oral Daily    amitriptyline (ELAVIL) tablet 50 mg  50 mg Oral HS    enoxaparin (LOVENOX) subcutaneous injection 40 mg  40 mg Subcutaneous Q24H MIKE    ferrous sulfate tablet 325 mg  325 mg Oral Every Other Day    meclizine (ANTIVERT) tablet 25 mg  25 mg Oral HS    ondansetron (ZOFRAN) injection 4 mg  4 mg Intravenous Q6H PRN    potassium chloride 10 % oral solution 40 mEq  40 mEq Oral 5x Daily    potassium chloride 20 mEq in D5W 100 mL  20 mEq Intravenous Q2H    potassium chloride 20 mEq in D5W 100 mL  20 mEq Intravenous Q2H    thiamine (VITAMIN B1) tablet 100 mg  100 mg Oral Daily    topiramate (TOPAMAX) tablet 100 mg  100 mg Oral BID    zinc sulfate (ZINCATE) capsule 220 mg  220 mg Oral Daily     Facility-Administered Medications Ordered in Other Encounters   Medication Dose Route Frequency    [MAR Hold] sodium chloride 0 9 % infusion  20 mL/hr Intravenous Continuous     Medications Discontinued During This Encounter   Medication Reason    zinc gluconate 50 mg tablet     potassium chloride 20 mEq IVPB (premix)     potassium chloride (K-DUR,KLOR-CON) CR tablet 40 mEq     potassium chloride 40 mEq IVPB (premix)        Deon Ac MD

## 2024-09-23 ENCOUNTER — APPOINTMENT (OUTPATIENT)
Dept: LAB | Facility: CLINIC | Age: 39
End: 2024-09-23
Payer: COMMERCIAL

## 2024-09-23 DIAGNOSIS — E87.6 HYPOKALEMIA: ICD-10-CM

## 2024-09-23 LAB
ANION GAP SERPL CALCULATED.3IONS-SCNC: 11 MMOL/L (ref 4–13)
BUN SERPL-MCNC: 16 MG/DL (ref 5–25)
CALCIUM SERPL-MCNC: 8.5 MG/DL (ref 8.4–10.2)
CHLORIDE SERPL-SCNC: 107 MMOL/L (ref 96–108)
CO2 SERPL-SCNC: 19 MMOL/L (ref 21–32)
CREAT SERPL-MCNC: 0.98 MG/DL (ref 0.6–1.3)
GFR SERPL CREATININE-BSD FRML MDRD: 72 ML/MIN/1.73SQ M
GLUCOSE SERPL-MCNC: 76 MG/DL (ref 65–140)
POTASSIUM SERPL-SCNC: 3.7 MMOL/L (ref 3.5–5.3)
SODIUM SERPL-SCNC: 137 MMOL/L (ref 135–147)

## 2024-09-23 PROCEDURE — 80048 BASIC METABOLIC PNL TOTAL CA: CPT

## 2024-09-23 PROCEDURE — 36415 COLL VENOUS BLD VENIPUNCTURE: CPT

## 2024-10-02 ENCOUNTER — APPOINTMENT (OUTPATIENT)
Dept: LAB | Facility: HOSPITAL | Age: 39
End: 2024-10-02
Payer: COMMERCIAL

## 2024-10-02 DIAGNOSIS — E87.6 HYPOKALEMIA: ICD-10-CM

## 2024-10-02 LAB
ANION GAP SERPL CALCULATED.3IONS-SCNC: 7 MMOL/L (ref 4–13)
BUN SERPL-MCNC: 17 MG/DL (ref 5–25)
CALCIUM SERPL-MCNC: 8.7 MG/DL (ref 8.4–10.2)
CHLORIDE SERPL-SCNC: 107 MMOL/L (ref 96–108)
CO2 SERPL-SCNC: 23 MMOL/L (ref 21–32)
CREAT SERPL-MCNC: 1.12 MG/DL (ref 0.6–1.3)
GFR SERPL CREATININE-BSD FRML MDRD: 61 ML/MIN/1.73SQ M
GLUCOSE SERPL-MCNC: 89 MG/DL (ref 65–140)
POTASSIUM SERPL-SCNC: 3.9 MMOL/L (ref 3.5–5.3)
SODIUM SERPL-SCNC: 137 MMOL/L (ref 135–147)

## 2024-10-02 PROCEDURE — 80048 BASIC METABOLIC PNL TOTAL CA: CPT

## 2024-10-02 PROCEDURE — 36415 COLL VENOUS BLD VENIPUNCTURE: CPT

## 2024-10-07 ENCOUNTER — APPOINTMENT (OUTPATIENT)
Dept: LAB | Facility: CLINIC | Age: 39
End: 2024-10-07
Payer: COMMERCIAL

## 2024-10-07 DIAGNOSIS — E87.6 HYPOKALEMIA: ICD-10-CM

## 2024-10-07 LAB
ANION GAP SERPL CALCULATED.3IONS-SCNC: 8 MMOL/L (ref 4–13)
BUN SERPL-MCNC: 15 MG/DL (ref 5–25)
CALCIUM SERPL-MCNC: 8.4 MG/DL (ref 8.4–10.2)
CHLORIDE SERPL-SCNC: 108 MMOL/L (ref 96–108)
CO2 SERPL-SCNC: 20 MMOL/L (ref 21–32)
CREAT SERPL-MCNC: 1.07 MG/DL (ref 0.6–1.3)
GFR SERPL CREATININE-BSD FRML MDRD: 65 ML/MIN/1.73SQ M
GLUCOSE SERPL-MCNC: 103 MG/DL (ref 65–140)
POTASSIUM SERPL-SCNC: 4.5 MMOL/L (ref 3.5–5.3)
SODIUM SERPL-SCNC: 136 MMOL/L (ref 135–147)

## 2024-10-07 PROCEDURE — 80048 BASIC METABOLIC PNL TOTAL CA: CPT

## 2024-10-07 PROCEDURE — 36415 COLL VENOUS BLD VENIPUNCTURE: CPT

## 2024-10-10 ENCOUNTER — PATIENT MESSAGE (OUTPATIENT)
Age: 39
End: 2024-10-10

## 2024-10-10 ENCOUNTER — NURSE TRIAGE (OUTPATIENT)
Age: 39
End: 2024-10-10

## 2024-10-10 LAB
APOB+LDLR+PCSK9 GENE MUT ANL BLD/T: NOT DETECTED
BRCA1+BRCA2 DEL+DUP + FULL MUT ANL BLD/T: NOT DETECTED
MLH1+MSH2+MSH6+PMS2 GN DEL+DUP+FUL M: NOT DETECTED

## 2024-10-10 NOTE — TELEPHONE ENCOUNTER
"Outbound call to patient with regards to current migraine and ear pain.     Patient reported slight throbbing above right temple that started yesterday around 1300. Stated that that throbbing and ear pain started around 1800 and progressed as the evening went on. Pt reported she felt nauseated as well. Pt took Zofran and ate smaller meals in order to help with the nausea.     Pt reported having vertigo and has been taking her Meclizine as prescribed.     Migraine:  Started yesterday afternoon. Stated 7/10 constant throbbing pain above right temple, right ear.     Associated Symptoms:  --Nausea  --Vertigo  --possible ear infection     Pt has taken prescribed migraines medications with no relief. Pt also tried ice and heat, lying in a dark cook room, and sleeping with no relief.     Current migraine medications are confirmed as:  --Amitriptyline 75 HS  --Sumatriptan Succinate inj 0.5 ml x 2   --Ondansetron 4 mg     Pt stated that she has never tried Decadron, Depakote, or Olanzapine in the past. Stated that she would be willing to try the PO steroid. Pt has had Toradol injections in the past that have helped but would like to try the PO steroid first if provider agreeable.     Char: please advise    Ear Pain:  Reported constant throbbing on right ear that started yesterday afternoon around 1300. Stated 7/10 pain. Pt reported having vertigo and has been taking her Meclizine as prescribed.     Denies the following:  --cough  --runny nose  --drainage  --fever  --having any earaches/infections in the past   -- using Q-tips or trauma to ear     Pt stated she did not contact her PCP with regards to ear symptoms. Pt aware to contact her PCP with current symptoms.     Reason for Disposition   All other earaches (Exceptions: earache lasting < 1 hour, and earache from air travel)   Similar to previously diagnosed migraine headaches    Answer Assessment - Initial Assessment Questions  1. LOCATION: \"Which ear is involved?\"    " "  Right ear    2. ONSET: \"When did the ear start hurting\"       Started yesterday around 1 pm and then ear 6pm     3. SEVERITY: \"How bad is the pain?\"  (Scale 1-10; mild, moderate or severe)    - MILD (1-3): doesn't interfere with normal activities     - MODERATE (4-7): interferes with normal activities or awakens from sleep     - SEVERE (8-10): excruciating pain, unable to do any normal activities       Right throbbing above right temple and then light throbbing behind right ear    4. URI SYMPTOMS: \"Do you have a runny nose or cough?\"      denies    5. FEVER: \"Do you have a fever?\" If Yes, ask: \"What is your temperature, how was it measured, and when did it start?\"      Denies    6. CAUSE: \"Have you been swimming recently?\", \"How often do you use Q-TIPS?\", \"Have you had any recent air travel or scuba diving?\"      Denies     7. OTHER SYMPTOMS: \"Do you have any other symptoms?\" (e.g., headache, stiff neck, dizziness, vomiting, runny nose, decreased hearing)     Nausea,vertigo     8. PREGNANCY: \"Is there any chance you are pregnant?\" \"When was your last menstrual period?\"      Denies    Answer Assessment - Initial Assessment Questions  When did migraine start? X 2 days     Location/Description: unilateral in the right temporal are, constant throbbing     Pain scale: 7    Associated symptoms:nausea, vertigo, possible ear infection     Precipitating factors: unsure     Alleviating factors:  none at this time.     What medications have you tried for this migraine headache? cold packs, lying in a darkened room, prescription medications: Sumatriptan injection (Imitrex) and Elavil , sleep, unable to obtain relief with OTC medications, and Tylenol       Current migraine medications are confirmed as:  --Amitriptyline 75 HS  --Sumatriptan Succinate inj 0.5 ml x 2   --Ondansetron 4 mg     Medications tried in the past?   Pt stated she has not tried those 3 those medications in the past.    Protocols used: Earache-ADULT-OH, " Headache-ADULT-OH

## 2024-10-10 NOTE — TELEPHONE ENCOUNTER
I would recommend eval by PCP or  Urgent care to rule out ear infection prior to considering decadron.  Please have her call back once evaluated and if this is rule out

## 2024-10-11 ENCOUNTER — PATIENT MESSAGE (OUTPATIENT)
Age: 39
End: 2024-10-11

## 2024-10-11 ENCOUNTER — OFFICE VISIT (OUTPATIENT)
Dept: URGENT CARE | Facility: CLINIC | Age: 39
End: 2024-10-11
Payer: COMMERCIAL

## 2024-10-11 VITALS
WEIGHT: 124.4 LBS | RESPIRATION RATE: 12 BRPM | HEIGHT: 62 IN | BODY MASS INDEX: 22.89 KG/M2 | DIASTOLIC BLOOD PRESSURE: 54 MMHG | HEART RATE: 82 BPM | SYSTOLIC BLOOD PRESSURE: 116 MMHG | OXYGEN SATURATION: 99 % | TEMPERATURE: 97 F

## 2024-10-11 DIAGNOSIS — H92.01 RIGHT EAR PAIN: ICD-10-CM

## 2024-10-11 DIAGNOSIS — H53.8 BLURRY VISION, RIGHT EYE: ICD-10-CM

## 2024-10-11 DIAGNOSIS — R51.9 ACUTE NONINTRACTABLE HEADACHE, UNSPECIFIED HEADACHE TYPE: Primary | ICD-10-CM

## 2024-10-11 PROCEDURE — 99213 OFFICE O/P EST LOW 20 MIN: CPT | Performed by: PHYSICIAN ASSISTANT

## 2024-10-11 RX ORDER — CELECOXIB 200 MG/1
CAPSULE ORAL
COMMUNITY
Start: 2024-04-02 | End: 2025-04-02

## 2024-10-11 NOTE — TELEPHONE ENCOUNTER
Inbound call received from patient.    Patient stated she went to [facility] this AM for further evaluation. Pt advised to contact Neurology for further recommendations or was advised to go tot he ED for further evaluation of current symptoms.     Pt is asking if the Decadron can be ordered or what are next steps per neurology.    Char: please advise.

## 2024-10-11 NOTE — TELEPHONE ENCOUNTER
Outbound call placed to patient.    Reviewed message below with patient. Patient verbalized understanding and is agreeable to go to the nearest ED for further evaluation.

## 2024-10-11 NOTE — TELEPHONE ENCOUNTER
2023   Jeri Jernigan IV (: 1957) is a 76 y.o. male, New patient, here for evaluation of the following chief complaint(s):  Headache, Chills, and Generalized Body Aches (Son and grandson the both were diagnosed with strep approx. 4 days ago. Woke up this morning with body aches, sore throat, slight temp (101/102) taken this morning. Dry heaving but no active vomiting.)     ASSESSMENT/PLAN:  Below is the assessment and plan developed based on review of pertinent history, physical exam, labs, studies, and medications. 1. Viral URI  -     AMB POC STREP GO A DIRECT, DNA PROBE         Handout given with care instructions  2. OTC for symptom management. Increase fluid intake, ensure adequate nutritional intake. 3. Follow up with PCP as needed. 4. Go to ED with development of any acute symptoms. Follow up:  Return if symptoms worsen or fail to improve. Follow up immediately for any new, worsening or changes or if symptoms are not improving over the next 5-7 days. SUBJECTIVE/OBJECTIVE:    Headache  Generalized Body Aches  Associated symptoms: headaches, rhinorrhea and sore throat       Diagnoses and all orders for this visit:  Viral URI  Jeri Jernigan IV is a 76 y.o. male who complains of recent onset of headache, sore throat, low-grade fever. He denies nasal congestion, sinus pressure, cough , sputum production, shortness of breath, wheezing . Patient also noted that OTC remedies did not help.   Reports low-grade fever     Results for orders placed or performed in visit on 23   AMB POC STREP GO A DIRECT, DNA PROBE   Result Value Ref Range    Valid Internal Control, POC yes     Strep pyogenes DNA, POC Negative Negative       Results for orders placed or performed in visit on 23   AMB POC STREP GO A DIRECT, DNA PROBE   Result Value Ref Range    Valid Internal Control, POC yes     Strep pyogenes DNA, POC Negative Negative     XR Results (most recent):  @[id number]([id number])@ Review urgent care notes- they said pt c/o right ear pain, hoarse voice, post nasal drip, pressure around eyes, double vision right eye, and headache. This is not consistent with her typical migraine symptoms. Urgent care recommended she go to the ER to rule out other underlying etiology due to atypical symptoms of patient's normal migraines.    I would agree with recommendation from urgent care.    If her current symptoms are consistent with prior migraines, then let me know and I will send a course of Decadron.

## 2024-10-11 NOTE — PROGRESS NOTES
[facility]        NAME: Brittani Patino is a 39 y.o. female  : 1985    MRN: [id number]  DATE: 2024  TIME: 9:03 AM    Assessment and Plan   Acute nonintractable headache, unspecified headache type [R51.9]  1. Acute nonintractable headache, unspecified headache type        2. Right ear pain        3. Blurry vision, right eye          Patient sent to ER to rule out other underlying etiology due to atypical symptoms of patient's normal migraines.    Patient Instructions   Go to ED    Follow up with PCP in 3-5 days.  Proceed to  ER if symptoms worsen.    If tests have been performed at [facility], our office will contact you with results if changes need to be made to the care plan discussed with you at the visit.  [facility]    Chief Complaint     Chief Complaint   Patient presents with    Cold Like Symptoms     Right ear pain, hoarse voice, post nasal drip, pressure around eyes, double vision right eye, and headache starting Wednesday.          History of Present Illness       Patient a 39-year-old female with significant past medical history of migraines, hypertension, and gastric bypass presents the office planing of 7 out of 10 throbbing right temporal headache, dizziness, nausea, blurry vision, and ear pain for 3 days.  Patient has a history of migraines but states symptoms today are not typical in nature of her migraines.  She tried her migraine medication with no relief.  States she has not been sleeping due to the pain.  Called her neurologist who advised her to be seen by PCP or urgent care to rule out ear infection.        Review of Systems   Review of Systems   Constitutional:  Negative for fever.   HENT:  Positive for ear pain, rhinorrhea and sore throat. Negative for congestion, dental problem and ear discharge.    Eyes:  Positive for visual disturbance. Negative for photophobia.   Respiratory:  Negative for cough and shortness of breath.     Cardiovascular:  Negative for chest pain and palpitations.   Gastrointestinal:  Positive for nausea. Negative for abdominal pain, diarrhea and vomiting.   Skin:  Negative for rash.   Neurological:  Positive for dizziness and headaches. Negative for light-headedness.         Current Medications       Current Outpatient Medications:     amitriptyline (ELAVIL) 50 mg tablet, Take 1.5 tablets (75 mg total) by mouth daily at bedtime, Disp: 135 tablet, Rfl: 1    celecoxib (CeleBREX) 200 mg capsule, TAKE 1 CAPSULE (200 MG TOTAL) BY MOUTH DAILY FOR 5 DAYS. G, Disp: , Rfl:     cyanocobalamin 1,000 mcg/mL, Inject 1 mL (1,000 mcg total) into a muscle every 30 (thirty) days Next dose due 5/1/2023 (Patient taking differently: Inject 1,000 mcg into a muscle every 30 (thirty) days Next dose due 9/1/24), Disp: , Rfl:     ergocalciferol (ERGOCALCIFEROL) 1.25 MG (69773 UT) capsule, Take 1 capsule (50,000 Units total) by mouth 2 (two) times a week Mondays and Thursdays, Disp: 24 capsule, Rfl: 1    escitalopram (LEXAPRO) 20 mg tablet, 20 mg daily at bedtime, Disp: , Rfl:     meclizine (ANTIVERT) 12.5 MG tablet, Take 1 tablet (12.5 mg total) by mouth 3 (three) times a day as needed for dizziness Do not take daily, Disp: 30 tablet, Rfl: 0    ondansetron (ZOFRAN-ODT) 4 mg disintegrating tablet, Take 4 mg by mouth every 6 (six) hours as needed for nausea or vomiting, Disp: , Rfl:     potassium chloride 20 MEQ/50ML, Inject 200 mL (80 mEq total) into a catheter in a vein daily 80 meq in 1 litre bag to be infused daily over 8 hours. Continue as ordered prior to admission, Disp: 5600 mL, Rfl: 11    spironolactone (ALDACTONE) 25 mg tablet, Take 0.5 tablets (12.5 mg total) by mouth daily, Disp: 15 tablet, Rfl: 0    SUMAtriptan Succinate 6 MG/0.5ML SOAJ, Inject 0.5 mL (6 mg total) under the skin as needed (migraine) May repeat once in 1 hour if needed. Max 12 mg/day, Max 3 days per week, Disp: 3 mL, Rfl: 3    Syringe/Needle, Disp, (SYRINGE  "3CC/25GX5/8\") 25G X 5/8\" 3 ML MISC, Use once monthly for B12 injection., Disp: 1 each, Rfl: 11    vitamin A 3 MG (46916 UT) capsule, Take 10,000 Units by mouth 2 (two) times a day, Disp: , Rfl:     vitamin k 100 MCG tablet, Take 1 tablet by mouth daily, Disp: , Rfl:     Current Allergies     Allergies as of 10/11/2024 - Reviewed 10/11/2024   Allergen Reaction Noted    Other Anaphylaxis 10/11/2024    Nsaids Other (See Comments) 2017    Prednisone  2019            The following portions of the patient's history were reviewed and updated as appropriate: allergies, current medications, past family history, past medical history, past social history, past surgical history and problem list.     Past Medical History:   Diagnosis Date    C. difficile colitis 2016    COVID-19     2022- pt denies long covid    DUB (dysfunctional uterine bleeding)     H. pylori infection     Hypertension     Hypokalemia     Kidney stone 2023    Left lower quadrant abdominal pain 2020    Migraine     WAGNER (obstructive sleep apnea) 2014    Pancreatitis     Psychiatric disorder     Anxiety    Rectal bleeding     Renal disorder     Rhabdomyolysis     Sleep apnea     resolved with bariatric surgery    Thrombosed external hemorrhoid        Past Surgical History:   Procedure Laterality Date    ABDOMINAL SURGERY      APPENDECTOMY       SECTION      CHOLECYSTECTOMY      COSMETIC SURGERY      \"tummy tuck\"    FL GUIDED CENTRAL VENOUS ACCESS DEVICE INSERTION  2018    FL GUIDED CENTRAL VENOUS ACCESS DEVICE INSERTION  2024    FL RETROGRADE PYELOGRAM  1/3/2023    GASTRIC BYPASS      HYSTERECTOMY      LAPAROSCOPY      MA ANRCT XM SURG REQ ANES GENERAL SPI/EDRL DX N/A 2021    Procedure: ANAL EXAM UNDER ANESTHESIA; HEMORRHOIDECTOMY;  Surgeon: Dona Jeffries DO;  Location:  MAIN OR;  Service: General    MA CYSTO/URETERO W/LITHOTRIPSY &INDWELL STENT INSRT Left 1/3/2023    Procedure: CYSTOSCOPY " "URETEROSCOPY WITH RETROGRADE PYELOGRAM, BASKET STONE EXTRACTION AND INSERTION STENT URETERAL;  Surgeon: Geovanny Rosales MD;  Location: [facility];  Service: Urology    ID EXC THROMBOSED HEMORRHOID XTRNL N/A 7/8/2022    Procedure: EXCISION OF THROMBOSED EXTERNAL HEMORRHOID, Excision of perianal skin tag, dranage of perianal abscess, anal fistulatomy;  Surgeon: Dona Jeffries DO;  Location: [facility]IN OR;  Service: General    ID INSJ TUNNELED CTR VAD W/SUBQ PORT AGE 5 YR/> Left 2/9/2024    Procedure: INSERTION VENOUS PORT (PORT-A-CATH);  Surgeon: Ryan Singh DO;  Location: [facility];  Service: General    ID RMVL AMIRAH CTR VAD W/SUBQ PORT/ CTR/PRPH INSJ Right 2/9/2024    Procedure: REMOVAL VENOUS PORT (PORT-A-CATH);  Surgeon: Ryan Singh DO;  Location: [facility];  Service: General    TUNNELED VENOUS PORT PLACEMENT N/A 12/21/2018    Procedure: INSERTION VENOUS PORT (PORT-A-CATH);  Surgeon: Ryan Singh DO;  Location: [facility];  Service: General       Family History   Problem Relation Age of Onset    No Known Problems Mother     Hypertension Father     Diabetes Father     Urolithiasis Father     Prostate cancer Father     Diabetes Maternal Grandfather          Medications have been verified.        Objective   /54   Pulse 82   Temp (!) 97 °F (36.1 °C)   Resp 12   Ht 5' 2\" (1.575 m)   Wt 56.4 kg (124 lb 6.4 oz)   LMP  (LMP Unknown)   SpO2 99%   BMI 22.75 kg/m²   No LMP recorded (lmp unknown). Patient has had a hysterectomy.       Physical Exam     Physical Exam  Vitals and nursing note reviewed.   Constitutional:       Appearance: Normal appearance. She is well-developed.   HENT:      Head: Normocephalic and atraumatic.      Right Ear: Tympanic membrane, ear canal and external ear normal.      Left Ear: Tympanic membrane, ear canal and external ear normal.      Nose: Nose normal.      Mouth/Throat:      Pharynx: Uvula midline.   Eyes:      General: Lids are normal.      " Conjunctiva/sclera: Conjunctivae normal.      Pupils: Pupils are equal, round, and reactive to light.   Cardiovascular:      Rate and Rhythm: Normal rate and regular rhythm.      Pulses: Normal pulses.      Heart sounds: Normal heart sounds. No murmur heard.     No friction rub. No gallop.   Pulmonary:      Effort: Pulmonary effort is normal.      Breath sounds: Normal breath sounds. No wheezing, rhonchi or rales.   Chest:      Chest wall: No tenderness.   Abdominal:      General: Bowel sounds are normal.      Palpations: Abdomen is soft.      Tenderness: There is no abdominal tenderness.   Musculoskeletal:         General: Normal range of motion.      Cervical back: Full passive range of motion without pain and neck supple.      Thoracic back: Normal.      Lumbar back: Normal.   Lymphadenopathy:      Cervical: No cervical adenopathy.   Skin:     General: Skin is warm and dry.      Capillary Refill: Capillary refill takes less than 2 seconds.      Findings: No rash.   Neurological:      General: No focal deficit present.      Mental Status: She is alert.      Cranial Nerves: Cranial nerves are intact.      Sensory: Sensation is intact.      Motor: Motor function is intact.      Coordination: Coordination is intact.      Gait: Gait is intact.      Deep Tendon Reflexes: Reflexes are normal and symmetric.   Psychiatric:         Speech: Speech normal.         Behavior: Behavior normal.

## 2024-10-15 ENCOUNTER — APPOINTMENT (OUTPATIENT)
Dept: LAB | Facility: CLINIC | Age: 39
End: 2024-10-15
Payer: COMMERCIAL

## 2024-10-15 DIAGNOSIS — E87.6 HYPOKALEMIA: ICD-10-CM

## 2024-10-15 LAB
ANION GAP SERPL CALCULATED.3IONS-SCNC: 11 MMOL/L (ref 4–13)
BUN SERPL-MCNC: 14 MG/DL (ref 5–25)
CALCIUM SERPL-MCNC: 8.3 MG/DL (ref 8.4–10.2)
CHLORIDE SERPL-SCNC: 104 MMOL/L (ref 96–108)
CO2 SERPL-SCNC: 20 MMOL/L (ref 21–32)
CREAT SERPL-MCNC: 1.01 MG/DL (ref 0.6–1.3)
GFR SERPL CREATININE-BSD FRML MDRD: 69 ML/MIN/1.73SQ M
GLUCOSE SERPL-MCNC: 80 MG/DL (ref 65–140)
POTASSIUM SERPL-SCNC: 4 MMOL/L (ref 3.5–5.3)
SODIUM SERPL-SCNC: 135 MMOL/L (ref 135–147)

## 2024-10-15 PROCEDURE — 36415 COLL VENOUS BLD VENIPUNCTURE: CPT

## 2024-10-15 PROCEDURE — 80048 BASIC METABOLIC PNL TOTAL CA: CPT

## 2024-10-21 ENCOUNTER — APPOINTMENT (OUTPATIENT)
Dept: LAB | Facility: CLINIC | Age: 39
End: 2024-10-21
Payer: COMMERCIAL

## 2024-10-21 DIAGNOSIS — E87.6 HYPOKALEMIA: ICD-10-CM

## 2024-10-21 LAB
ANION GAP SERPL CALCULATED.3IONS-SCNC: 10 MMOL/L (ref 4–13)
BUN SERPL-MCNC: 12 MG/DL (ref 5–25)
CALCIUM SERPL-MCNC: 8.8 MG/DL (ref 8.4–10.2)
CHLORIDE SERPL-SCNC: 102 MMOL/L (ref 96–108)
CO2 SERPL-SCNC: 22 MMOL/L (ref 21–32)
CREAT SERPL-MCNC: 1.04 MG/DL (ref 0.6–1.3)
GFR SERPL CREATININE-BSD FRML MDRD: 67 ML/MIN/1.73SQ M
GLUCOSE SERPL-MCNC: 83 MG/DL (ref 65–140)
POTASSIUM SERPL-SCNC: 3.8 MMOL/L (ref 3.5–5.3)
SODIUM SERPL-SCNC: 134 MMOL/L (ref 135–147)

## 2024-10-21 PROCEDURE — 36415 COLL VENOUS BLD VENIPUNCTURE: CPT

## 2024-10-21 PROCEDURE — 80048 BASIC METABOLIC PNL TOTAL CA: CPT

## 2024-10-24 ENCOUNTER — TELEPHONE (OUTPATIENT)
Age: 39
End: 2024-10-24

## 2024-10-24 NOTE — TELEPHONE ENCOUNTER
I called and left a detailed VM regarding the following:    ----- Message from Rafael Chong PA-C sent at 10/23/2024  3:54 PM EDT -----  Potassium is normal, sodium is slightly low       I requested a call back if she has any questions or concerns.

## 2024-10-28 ENCOUNTER — APPOINTMENT (OUTPATIENT)
Dept: LAB | Facility: HOSPITAL | Age: 39
End: 2024-10-28
Payer: COMMERCIAL

## 2024-10-28 ENCOUNTER — TELEPHONE (OUTPATIENT)
Age: 39
End: 2024-10-28

## 2024-10-28 DIAGNOSIS — E87.6 HYPOKALEMIA: ICD-10-CM

## 2024-10-28 LAB
ANION GAP SERPL CALCULATED.3IONS-SCNC: 9 MMOL/L (ref 4–13)
BUN SERPL-MCNC: 17 MG/DL (ref 5–25)
CALCIUM SERPL-MCNC: 8.7 MG/DL (ref 8.4–10.2)
CHLORIDE SERPL-SCNC: 102 MMOL/L (ref 96–108)
CO2 SERPL-SCNC: 22 MMOL/L (ref 21–32)
CREAT SERPL-MCNC: 1.16 MG/DL (ref 0.6–1.3)
GFR SERPL CREATININE-BSD FRML MDRD: 59 ML/MIN/1.73SQ M
GLUCOSE SERPL-MCNC: 87 MG/DL (ref 65–140)
POTASSIUM SERPL-SCNC: 3.8 MMOL/L (ref 3.5–5.3)
SODIUM SERPL-SCNC: 133 MMOL/L (ref 135–147)

## 2024-10-28 PROCEDURE — 36415 COLL VENOUS BLD VENIPUNCTURE: CPT

## 2024-10-28 PROCEDURE — 80048 BASIC METABOLIC PNL TOTAL CA: CPT

## 2024-10-28 NOTE — TELEPHONE ENCOUNTER
I called and left a detailed VM for Brittani regarding the following:    ----- Message from Juan Carlos Ricketts DO sent at 10/28/2024 10:39 AM EDT -----  Potassium level remains stable, continue with current infusions      I requested a call back if she has any questions or concerns.

## 2024-11-04 ENCOUNTER — APPOINTMENT (OUTPATIENT)
Dept: LAB | Facility: CLINIC | Age: 39
End: 2024-11-04
Payer: COMMERCIAL

## 2024-11-04 ENCOUNTER — TELEPHONE (OUTPATIENT)
Age: 39
End: 2024-11-04

## 2024-11-04 DIAGNOSIS — E87.6 HYPOKALEMIA: ICD-10-CM

## 2024-11-04 LAB
ANION GAP SERPL CALCULATED.3IONS-SCNC: 12 MMOL/L (ref 4–13)
BUN SERPL-MCNC: 17 MG/DL (ref 5–25)
CALCIUM SERPL-MCNC: 8.8 MG/DL (ref 8.4–10.2)
CHLORIDE SERPL-SCNC: 102 MMOL/L (ref 96–108)
CO2 SERPL-SCNC: 24 MMOL/L (ref 21–32)
CREAT SERPL-MCNC: 1.05 MG/DL (ref 0.6–1.3)
GFR SERPL CREATININE-BSD FRML MDRD: 66 ML/MIN/1.73SQ M
GLUCOSE SERPL-MCNC: 111 MG/DL (ref 65–140)
POTASSIUM SERPL-SCNC: 3.4 MMOL/L (ref 3.5–5.3)
SODIUM SERPL-SCNC: 138 MMOL/L (ref 135–147)

## 2024-11-04 PROCEDURE — 80048 BASIC METABOLIC PNL TOTAL CA: CPT

## 2024-11-04 PROCEDURE — 36415 COLL VENOUS BLD VENIPUNCTURE: CPT

## 2024-11-05 ENCOUNTER — TELEPHONE (OUTPATIENT)
Age: 39
End: 2024-11-05

## 2024-11-05 ENCOUNTER — OFFICE VISIT (OUTPATIENT)
Age: 39
End: 2024-11-05
Payer: COMMERCIAL

## 2024-11-05 VITALS
BODY MASS INDEX: 23.26 KG/M2 | HEIGHT: 62 IN | DIASTOLIC BLOOD PRESSURE: 64 MMHG | WEIGHT: 126.4 LBS | OXYGEN SATURATION: 99 % | SYSTOLIC BLOOD PRESSURE: 110 MMHG | HEART RATE: 89 BPM

## 2024-11-05 DIAGNOSIS — G43.709 CHRONIC MIGRAINE WITHOUT AURA WITHOUT STATUS MIGRAINOSUS, NOT INTRACTABLE: Primary | ICD-10-CM

## 2024-11-05 PROCEDURE — 99214 OFFICE O/P EST MOD 30 MIN: CPT

## 2024-11-05 RX ORDER — EPINEPHRINE 0.3 MG/.3ML
0.3 INJECTION SUBCUTANEOUS
COMMUNITY
Start: 2024-07-05

## 2024-11-05 NOTE — PROGRESS NOTES
Ambulatory Visit  Name: Brittani Patino      : 1985      MRN: [id number]  Encounter Provider: ARIADNA Gtz  Encounter Date: 2024   Encounter department: [facility] NEUROLOGY [facility] BATH    Assessment & Plan      {Ambulatory Patient Instructions (Optional):50767}  History of Present Illness {?Quick Links Encounters * My Last Note * Last Note in Specialty * Snapshot * Since Last Visit * History :95434}  HPI  Review of Systems   Constitutional:  Negative for appetite change, fatigue and fever.   HENT: Negative.  Negative for hearing loss, tinnitus, trouble swallowing and voice change.    Eyes: Negative.  Negative for photophobia, pain and visual disturbance.   Respiratory: Negative.  Negative for shortness of breath.    Cardiovascular: Negative.  Negative for palpitations.   Gastrointestinal: Negative.  Negative for nausea and vomiting.   Endocrine: Negative.  Negative for cold intolerance.   Genitourinary: Negative.  Negative for dysuria, frequency and urgency.   Musculoskeletal:  Negative for back pain, gait problem, myalgias, neck pain and neck stiffness.   Skin: Negative.  Negative for rash.   Allergic/Immunologic: Negative.    Neurological:  Positive for headaches (2-3/week). Negative for dizziness, tremors, seizures, syncope, facial asymmetry, speech difficulty, weakness, light-headedness and numbness.   Hematological: Negative.  Does not bruise/bleed easily.   Psychiatric/Behavioral: Negative.  Negative for confusion, hallucinations and sleep disturbance.    All other systems reviewed and are negative.    I have personally reviewed the MA's review of systems and made changes as necessary.    {Select to Display PM (Optional):48847}  Objective   {?Quick Links Trend Vitals * Enter New Vitals * Results Review * Timeline (Adult) * Labs * Imaging * Cardiology * Procedures * Lung Cancer Screening * Surgical eConsent :71023}  LMP  (LMP Unknown)     Physical Exam  Neurologic  Exam    Results/Data:  I have reviewed the results and images from the *** in detail with the patient.    {SL AMB Radiology Results Review (Optional):95960}    {Administrative / Billing Section (Optional):61643}

## 2024-11-05 NOTE — PROGRESS NOTES
Ambulatory Visit  Name: Brittani Patino      : 1985      MRN: [id number]  Encounter Provider: ARIADNA Gtz  Encounter Date: 2024   Encounter department: [facility]    Assessment & Plan  Chronic migraine without aura without status migrainosus, not intractable  Brittani Patino is a 39 year old female with a hx of migraine headaches since 2016. She reports an improvement in the frequency of her migraines since increasing amitriptyline to 75 mg daily. However they are still occurring 2-3 times per week. We discussed the option of increasing Amitriptyline further to 100 mg daily vs adding Emgality/Ajovy/Aimovig. She would be agreeable to continuing Amitriptyline 75 mg and adding a CGRP inhibitor. I prescribed Emgality and we discussed loading and maintenance dosing. I encouraged her to continue with adequate hydration, sleep, and vitamin supplementation. For migraine , she will continue to use sumatriptan SC  6 mg +/- zofran 4 mg +/- Tylenol 1000 mg, no more than 3 times per week. She was advised she may use this in combination with or without Tylenol and Zofran. She may also continue to use meclizine as needed, preferably no more than 3 times per week. She will follow up in 3-4 months; sooner if needed.     Orders:    Galcanezumab-gnlm 120 MG/ML SOAJ; Inject 240 mg under the skin once for 1 dose For just the first month loading dose, followed by 1 injection monthly on separate prescription.    Galcanezumab-gnlm 120 MG/ML SOAJ; Inject 120 mg under the skin every 30 (thirty) days Following the first month loading dose of 2 pens.      Patient Instructions   Headache/migraine treatment:   - When you have a moderate to severe headache, you should seek rest, initiate relaxation and apply cold compresses to the head.      Abortive medications (for immediate treatment of a headache):   It is ok to take ibuprofen, acetaminophen or naproxen (Advil, Tylenol,  Aleve,  "Excedrin) if they help your headaches you should limit these to no more than 3 times a week to avoid medication overuse/rebound headaches.      Take at the onset of a migraine use Sumatriptan SC 6 mg +/- zofran 4 mg +/- Tylenol 1000 mg  You may repeat sumatriptan 6 mg in 1 hour if needed. Max 12 mg/day, Max 3 days per week.     Over the counter preventive supplements for headaches/migraines   (to take every day to help prevent headaches - not to take at the time of headache):  [x] Magnesium 500 mg 1-2 times daily (If any diarrhea or upset stomach, decrease dose  as tolerated)  [x] Riboflavin (Vitamin B2) 400mg daily (FYI B2 may make your urine bright/neon yellow)     Prescription preventive medications for headaches/migraines   (to take every day to help prevent headaches - not to take at the time of headache):     Continue Amitriptyline 75 mg daily.  Start Emgality 240 mg (2 injections) x 1 month; then 120 mg (1 injection) every 30 days thereafter      *Typically these types of medications take time untill you see the benefit, although some may see improvement in days, often it may take weeks, especially if the medication is being titrated up to a beneficial level. Please contact us if there are any concerns or questions regarding the medication.      Self-Monitoring:  [x] Headache calendar. Each day donna a number from 0-10 indicating if there was a headache and how bad it was.  This can be used to monitor gradual improvement and is helpful to make medication adjustments. You can do this on paper or there is an ANNY for a smart phone called \"Migraine e Diary\".      Lifestyle Recommendations:  [x] SLEEP - Maintain a regular sleep schedule: Adults need at least 7-8 hours of uninterrupted a night. Maintain good sleep hygiene:  Going to bed and waking up at consistent times, avoiding excessive daytime naps, avoiding caffeinated beverages in the evening, avoid excessive stimulation in the evening and generally using bed " primarily for sleeping.  One hour before bedtime would recommend turning lights down lower, decreasing your activity (may read quietly, listen to music at a low volume). When you get into bed, should eliminate all technology (no texting, emailing, playing with your phone, iPad or tablet in bed).  [x] HYDRATION - Maintain good hydration.  Drink  2L of fluid a day (4 typical small water bottles)  [x] DIET - Maintain good nutrition. In particular don't skip meals and try and eat healthy balanced meals regularly.  [x] TRIGGERS - Look for other triggers and avoid them: Limit caffeine to 1-2 cups a day or less. Avoid dietary triggers that you have noticed bring on your headaches (this could include aged cheese, peanuts, MSG, aspartame and nitrates).  [x] EXERCISE - physical exercise as we all know is good for you in many ways, and not only is good for your heart, but also is beneficial for your mental health, cognitive health and  chronic pain/headaches. I would encourage at the least 5 days of physical exercise weekly for at least 30 minutes.      Education and Follow-up  [x] Please call with any questions or concerns. Of course if any new concerning symptoms go to the emergency department.  [x] Follow up in 3-4 months, sooner if needed     History of Present Illness     HPI Brittani Patino is a 39 year old female who presents to the office to follow up on her chronic migraine headaches. She was last seen 7/31/2024. At that time her migraines were improved with initiating Amitriptyline 50 mg but still occurring 3-4 times per week. As such we agreed to have her increase amitriptyline further to 75 mg daily. She was to obtain updated ECG prior to increasing dose to assess for QT prolongation. ECG 8/12/2024 showed NSR, when compared to ECG 2/18/24 QT is shortened. 8/23/24 she was admitted to the hospital for hypokalemia with a potassium of 2.5. Her ECG was abnormal in that setting. She also continues on sumatriptan SC  6 mg  +/- zofran 4 mg +/- Tylenol 1000 mg, no more than 3 times per week for migraine .    She returns to the office today and states she continues on Amitriptyline 75 mg daily. She reports an improvement in the frequency of her migraines to 2-3 times per week (prior was daily). She is using sumatriptan SC 6 mg +/- zofran 4 mg +/- Tylenol 1000 mg on average about once per week. She states she is using meclizine 12.5 mg 1 tablet once a week for vertigo. Vertigo is also sometimes triggered by a severe migraine. She is taking magnesium and riboflavin daily. She reports she is finishing up college courses online which has resulted in more screen time than usual, this is also exacerbating migraines. She remains on daily IV potassium replacement managed by nephrology. She denies any new neurologic complaints today. MIDAS score: 58 (severe disability)     Prior HPI:     [facility] last seen 3/2/2020. At that time she was maintained on:  Topamax 100 mg bid  Amitriptyline 50 mg at bedtime (up to 100 mg in the past)  Sumatriptan 100 mg PO as needed  Meclizine 25 mg as needed for dizziness/head spinning sensation  Zofran as needed for nausea  Had tried 1 dose of Botox in the past     She returns today and states s/p head injury in 2016 while training for [employer] where she describes being kneed in the temple, her migraines have been worse and has vertigo attacks. She states she had been having migraine three times per week, but in 2023 she had a severe vertigo attack that triggered a migraine and since then she is dealing with daily migraines that cause severe bilateral blurred and double vision (only present with a migraine).  She sees Ophthalmology routinely, last seen 1 year ago. She describes her migraines in more detail below.         Description of Headaches:  Location of pain: right-sided unilateral, temporal  Radiation of pain?:right-sided unilateral, temporal, parietal,  frontal, anterior neck  Character of pain:throbbing  Severity of pain: 6-7/10  Accompanying symptoms:nausea, vomiting, sonophobia, photophobia, photopsia, diplopia, mental status changes- cloudiness that causes difficulty with concentration, blurred vision   Prodromal sx?: denies aura  Rapidity of onset: sudden  Typical duration of individual headache: 24 hours   Frequency of headaches: every day   Are you ever headache free? Usually only headache free with sleep   Are most headaches similar in presentation? yes  Exacerbating factors:none  Typical precipitants: None identified      Temporal Pattern of Headaches:  Started having HA's: 2016 s/p head injury; denies headaches or migraines prior to this   Worst time of day: none   Awaken from sleep?: no  Seasonal pattern?: yes - spring/fall   'Clustering' of HA's over time? no  Overall pattern since problem began: stable unchanged since December 2023     Degree of Functional Impairment: moderate; depends on the day. Severe migraines cause her to lay down on the couch in a dark room. Other days she can function.      Current Use of Meds to Treat HA:  Abortive meds? Does not currently take any medications because nothing helps.  Daily use? no  Preventive meds?  Amitriptyline 20 mg  Non-Medical/Alternative treatments for HA: yes - acupuncture, Accupressure, daithe piercing      Additional Relevant History:  History of head/neck trauma? yes - s/p head injury in 2016 while training for [employer] where she describes being kneed in the temple  History of head/neck surgery? no  Family h/o headache problems?yes - none  Family history of aneurysms? no  Exposure to carbon monoxide? no  Substance use: alcohol: none, illicit drugs: none, tobacco: none, caffeine: none  Multivitamin daily, Vitamin K, A, D, and B12, Potassium, Iron, or melatonin as needed   Water: 16 oz x 4 daily  Sleep: close to 8 hours; no concerns with sleep  Diet: clean diet, several meals throughout the day (3 meals  3 snacks)  S/p Hysterectomy; no plans for pregnancy   She is a stay at home mom   2 children (11 and 15) and       Prior Evaluation/Treatments:  Have you seen another physician for your headaches? yes - [facility] neurology   Prior work-up: MRI brain, MRA head, CT head- all unremarkable   Trigger point injections? no  Botox injections? yes - once (vomiting x 2 days and worse migraine ever)  Epidural injections? no  Prior PREVENTATIVE medications: antidepressants (amitriptyline), topiramate- this was d/c due to renal stones  Prior ABORTIVE mediations: acetaminophen, NSAIDs (ibuprofen), Tylenol Arthritis, sumatriptan PO- partially effective. Sumatriptan SC- effective quickly.  Gabapentin prescribed for surgical pain made migraines worse. Zofran as needed.       Review of Systems  Constitutional:  Negative for appetite change, fatigue and fever.   HENT: Negative.  Negative for hearing loss, tinnitus, trouble swallowing and voice change.    Eyes: Negative.  Negative for photophobia, pain and visual disturbance.   Respiratory: Negative.  Negative for shortness of breath.    Cardiovascular: Negative.  Negative for palpitations.   Gastrointestinal: Negative.  Negative for nausea and vomiting.   Endocrine: Negative.  Negative for cold intolerance.   Genitourinary: Negative.  Negative for dysuria, frequency and urgency.   Musculoskeletal:  Negative for back pain, gait problem, myalgias, neck pain and neck stiffness.   Skin: Negative.  Negative for rash.   Allergic/Immunologic: Negative.    Neurological:  Positive for headaches (2-3/week). Negative for dizziness, tremors, seizures, syncope, facial asymmetry, speech difficulty, weakness, light-headedness and numbness.   Hematological: Negative.  Does not bruise/bleed easily.   Psychiatric/Behavioral: Negative.  Negative for confusion, hallucinations and sleep disturbance.    All other systems reviewed and are negative.  I have personally reviewed the MA's review of  "systems and made changes as necessary.    Current Outpatient Medications on File Prior to Visit   Medication Sig Dispense Refill    amitriptyline (ELAVIL) 50 mg tablet Take 1.5 tablets (75 mg total) by mouth daily at bedtime 135 tablet 1    cyanocobalamin 1,000 mcg/mL Inject 1 mL (1,000 mcg total) into a muscle every 30 (thirty) days Next dose due 5/1/2023 (Patient taking differently: Inject 1,000 mcg into a muscle every 30 (thirty) days Next dose due 9/1/24)      EPINEPHrine (EPIPEN) 0.3 mg/0.3 mL SOAJ Inject 0.3 mg into a muscle      ergocalciferol (ERGOCALCIFEROL) 1.25 MG (57645 UT) capsule Take 1 capsule (50,000 Units total) by mouth 2 (two) times a week Mondays and Thursdays 24 capsule 1    escitalopram (LEXAPRO) 20 mg tablet 20 mg daily at bedtime      meclizine (ANTIVERT) 12.5 MG tablet Take 1 tablet (12.5 mg total) by mouth 3 (three) times a day as needed for dizziness Do not take daily 30 tablet 0    ondansetron (ZOFRAN-ODT) 4 mg disintegrating tablet Take 4 mg by mouth every 6 (six) hours as needed for nausea or vomiting      potassium chloride 20 MEQ/50ML Inject 200 mL (80 mEq total) into a catheter in a vein daily 80 meq in 1 litre bag to be infused daily over 8 hours. Continue as ordered prior to admission 5600 mL 11    spironolactone (ALDACTONE) 25 mg tablet Take 0.5 tablets (12.5 mg total) by mouth daily 15 tablet 0    SUMAtriptan Succinate 6 MG/0.5ML SOAJ Inject 0.5 mL (6 mg total) under the skin as needed (migraine) May repeat once in 1 hour if needed. Max 12 mg/day, Max 3 days per week 3 mL 3    Syringe/Needle, Disp, (SYRINGE 3CC/25GX5/8\") 25G X 5/8\" 3 ML MISC Use once monthly for B12 injection. 1 each 11    vitamin A 3 MG (21153 UT) capsule Take 10,000 Units by mouth 2 (two) times a day      vitamin k 100 MCG tablet Take 1 tablet by mouth daily      celecoxib (CeleBREX) 200 mg capsule TAKE 1 CAPSULE (200 MG TOTAL) BY MOUTH DAILY FOR 5 DAYS. G (Patient not taking: Reported on 11/5/2024)       No " "current facility-administered medications on file prior to visit.      Objective     /64 (BP Location: Right arm, Patient Position: Sitting, Cuff Size: Large)   Pulse 89   Ht 5' 2\" (1.575 m)   Wt 57.3 kg (126 lb 6.4 oz)   LMP  (LMP Unknown)   SpO2 99%   BMI 23.12 kg/m²     Neurologic Exam  On neurological examination patient is alert, awake, oriented and in no distress. Speech is fluent without dysarthria or aphasia. She is able to rise easily without assistance from a seated position. Casual gait is normal including stance, stride, and arm swing.        Administrative Statements     I have spent a total time of 30 minutes in caring for this patient on the day of the visit/encounter including Diagnostic results, Prognosis, Risks and benefits of tx options, Instructions for management, Patient and family education, Importance of tx compliance, Risk factor reductions, Impressions, Counseling / Coordination of care, Documenting in the medical record, Reviewing / ordering tests, medicine, procedures  , and Obtaining or reviewing history  .  "

## 2024-11-05 NOTE — TELEPHONE ENCOUNTER
I called and left a detailed VM for Brittani regarding the following:    ----- Message from Rafael Chong PA-C sent at 11/5/2024  3:13 PM EST -----  Potassium level is slightly low at 3.4.  Please continue with your IV potassium infusions overnight.    I requested a call back if she has any questions or concerns.

## 2024-11-05 NOTE — ASSESSMENT & PLAN NOTE
Brittani Patino is a 39 year old female with a hx of migraine headaches since 2016. She reports an improvement in the frequency of her migraines since increasing amitriptyline to 75 mg daily. However they are still occurring 2-3 times per week. We discussed the option of increasing Amitriptyline further to 100 mg daily vs adding Emgality/Ajovy/Aimovig. She would be agreeable to continuing Amitriptyline 75 mg and adding a CGRP inhibitor. I prescribed Emgality and we discussed loading and maintenance dosing. I encouraged her to continue with adequate hydration, sleep, and vitamin supplementation. For migraine , she will continue to use sumatriptan SC  6 mg +/- zofran 4 mg +/- Tylenol 1000 mg, no more than 3 times per week. She was advised she may use this in combination with or without Tylenol and Zofran. She may also continue to use meclizine as needed, preferably no more than 3 times per week. She will follow up in 3-4 months; sooner if needed.     Orders:    Galcanezumab-gnlm 120 MG/ML SOAJ; Inject 240 mg under the skin once for 1 dose For just the first month loading dose, followed by 1 injection monthly on separate prescription.    Galcanezumab-gnlm 120 MG/ML SOAJ; Inject 120 mg under the skin every 30 (thirty) days Following the first month loading dose of 2 pens.

## 2024-11-05 NOTE — PATIENT INSTRUCTIONS
"Headache/migraine treatment:   - When you have a moderate to severe headache, you should seek rest, initiate relaxation and apply cold compresses to the head.      Abortive medications (for immediate treatment of a headache):   It is ok to take ibuprofen, acetaminophen or naproxen (Advil, Tylenol,  Aleve, Excedrin) if they help your headaches you should limit these to no more than 3 times a week to avoid medication overuse/rebound headaches.      Take at the onset of a migraine use Sumatriptan SC 6 mg +/- zofran 4 mg +/- Tylenol 1000 mg  You may repeat sumatriptan 6 mg in 1 hour if needed. Max 12 mg/day, Max 3 days per week.     Over the counter preventive supplements for headaches/migraines   (to take every day to help prevent headaches - not to take at the time of headache):  [x] Magnesium 500 mg 1-2 times daily (If any diarrhea or upset stomach, decrease dose  as tolerated)  [x] Riboflavin (Vitamin B2) 400mg daily (FYI B2 may make your urine bright/neon yellow)     Prescription preventive medications for headaches/migraines   (to take every day to help prevent headaches - not to take at the time of headache):     Continue Amitriptyline 75 mg daily.  Start Emgality 240 mg (2 injections) x 1 month; then 120 mg (1 injection) every 30 days thereafter      *Typically these types of medications take time untill you see the benefit, although some may see improvement in days, often it may take weeks, especially if the medication is being titrated up to a beneficial level. Please contact us if there are any concerns or questions regarding the medication.      Self-Monitoring:  [x] Headache calendar. Each day donna a number from 0-10 indicating if there was a headache and how bad it was.  This can be used to monitor gradual improvement and is helpful to make medication adjustments. You can do this on paper or there is an ANNY for a smart phone called \"Migraine e Diary\".      Lifestyle Recommendations:  [x] SLEEP - Maintain a " regular sleep schedule: Adults need at least 7-8 hours of uninterrupted a night. Maintain good sleep hygiene:  Going to bed and waking up at consistent times, avoiding excessive daytime naps, avoiding caffeinated beverages in the evening, avoid excessive stimulation in the evening and generally using bed primarily for sleeping.  One hour before bedtime would recommend turning lights down lower, decreasing your activity (may read quietly, listen to music at a low volume). When you get into bed, should eliminate all technology (no texting, emailing, playing with your phone, iPad or tablet in bed).  [x] HYDRATION - Maintain good hydration.  Drink  2L of fluid a day (4 typical small water bottles)  [x] DIET - Maintain good nutrition. In particular don't skip meals and try and eat healthy balanced meals regularly.  [x] TRIGGERS - Look for other triggers and avoid them: Limit caffeine to 1-2 cups a day or less. Avoid dietary triggers that you have noticed bring on your headaches (this could include aged cheese, peanuts, MSG, aspartame and nitrates).  [x] EXERCISE - physical exercise as we all know is good for you in many ways, and not only is good for your heart, but also is beneficial for your mental health, cognitive health and  chronic pain/headaches. I would encourage at the least 5 days of physical exercise weekly for at least 30 minutes.      Education and Follow-up  [x] Please call with any questions or concerns. Of course if any new concerning symptoms go to the emergency department.  [x] Follow up in 3-4 months, sooner if needed

## 2024-11-06 ENCOUNTER — TELEPHONE (OUTPATIENT)
Age: 39
End: 2024-11-06

## 2024-11-07 ENCOUNTER — APPOINTMENT (EMERGENCY)
Dept: CT IMAGING | Facility: HOSPITAL | Age: 39
End: 2024-11-07
Payer: COMMERCIAL

## 2024-11-07 ENCOUNTER — HOSPITAL ENCOUNTER (EMERGENCY)
Facility: HOSPITAL | Age: 39
Discharge: HOME | End: 2024-11-07
Attending: EMERGENCY MEDICINE | Admitting: EMERGENCY MEDICINE
Payer: COMMERCIAL

## 2024-11-07 VITALS
WEIGHT: 126 LBS | TEMPERATURE: 97.9 F | HEART RATE: 103 BPM | OXYGEN SATURATION: 99 % | RESPIRATION RATE: 16 BRPM | BODY MASS INDEX: 23.19 KG/M2 | HEIGHT: 62 IN | DIASTOLIC BLOOD PRESSURE: 78 MMHG | SYSTOLIC BLOOD PRESSURE: 119 MMHG

## 2024-11-07 DIAGNOSIS — R10.9 ABDOMINAL PAIN: Primary | ICD-10-CM

## 2024-11-07 LAB
ALBUMIN SERPL BCG-MCNC: 4.1 G/DL (ref 3.5–5)
ALP SERPL-CCNC: 64 U/L (ref 34–104)
ALT SERPL W P-5'-P-CCNC: 14 U/L (ref 7–52)
ANION GAP SERPL CALCULATED.3IONS-SCNC: 8 MMOL/L (ref 4–13)
AST SERPL W P-5'-P-CCNC: 19 U/L (ref 13–39)
BASOPHILS # BLD AUTO: 0.09 THOUSANDS/ΜL (ref 0–0.1)
BASOPHILS NFR BLD AUTO: 1 % (ref 0–1)
BILIRUB SERPL-MCNC: 0.22 MG/DL (ref 0.2–1)
BILIRUB UR QL STRIP: NEGATIVE
BUN SERPL-MCNC: 17 MG/DL (ref 5–25)
CALCIUM SERPL-MCNC: 8.1 MG/DL (ref 8.4–10.2)
CHLORIDE SERPL-SCNC: 107 MMOL/L (ref 96–108)
CLARITY UR: CLEAR
CO2 SERPL-SCNC: 18 MMOL/L (ref 21–32)
COLOR UR: YELLOW
CREAT SERPL-MCNC: 1.04 MG/DL (ref 0.6–1.3)
EOSINOPHIL # BLD AUTO: 0.06 THOUSAND/ΜL (ref 0–0.61)
EOSINOPHIL NFR BLD AUTO: 1 % (ref 0–6)
ERYTHROCYTE [DISTWIDTH] IN BLOOD BY AUTOMATED COUNT: 15.9 % (ref 11.6–15.1)
GFR SERPL CREATININE-BSD FRML MDRD: 67 ML/MIN/1.73SQ M
GLUCOSE SERPL-MCNC: 96 MG/DL (ref 65–140)
GLUCOSE UR STRIP-MCNC: NEGATIVE MG/DL
HCT VFR BLD AUTO: 37.3 % (ref 34.8–46.1)
HGB BLD-MCNC: 11.3 G/DL (ref 11.5–15.4)
HGB UR QL STRIP.AUTO: NEGATIVE
IMM GRANULOCYTES # BLD AUTO: 0.04 THOUSAND/UL (ref 0–0.2)
IMM GRANULOCYTES NFR BLD AUTO: 0 % (ref 0–2)
KETONES UR STRIP-MCNC: NEGATIVE MG/DL
LEUKOCYTE ESTERASE UR QL STRIP: NEGATIVE
LYMPHOCYTES # BLD AUTO: 1.55 THOUSANDS/ΜL (ref 0.6–4.47)
LYMPHOCYTES NFR BLD AUTO: 13 % (ref 14–44)
MCH RBC QN AUTO: 25.5 PG (ref 26.8–34.3)
MCHC RBC AUTO-ENTMCNC: 30.3 G/DL (ref 31.4–37.4)
MCV RBC AUTO: 84 FL (ref 82–98)
MONOCYTES # BLD AUTO: 0.81 THOUSAND/ΜL (ref 0.17–1.22)
MONOCYTES NFR BLD AUTO: 7 % (ref 4–12)
NEUTROPHILS # BLD AUTO: 9.45 THOUSANDS/ΜL (ref 1.85–7.62)
NEUTS SEG NFR BLD AUTO: 78 % (ref 43–75)
NITRITE UR QL STRIP: NEGATIVE
NRBC BLD AUTO-RTO: 0 /100 WBCS
PH UR STRIP.AUTO: 6.5 [PH]
PLATELET # BLD AUTO: 353 THOUSANDS/UL (ref 149–390)
PMV BLD AUTO: 9.6 FL (ref 8.9–12.7)
POTASSIUM SERPL-SCNC: 4.3 MMOL/L (ref 3.5–5.3)
PROT SERPL-MCNC: 7.7 G/DL (ref 6.4–8.4)
PROT UR STRIP-MCNC: NEGATIVE MG/DL
RBC # BLD AUTO: 4.43 MILLION/UL (ref 3.81–5.12)
SODIUM SERPL-SCNC: 133 MMOL/L (ref 135–147)
SP GR UR STRIP.AUTO: 1.01 (ref 1–1.03)
UROBILINOGEN UR QL STRIP.AUTO: 0.2 E.U./DL
WBC # BLD AUTO: 12 THOUSAND/UL (ref 4.31–10.16)

## 2024-11-07 PROCEDURE — 81003 URINALYSIS AUTO W/O SCOPE: CPT | Performed by: EMERGENCY MEDICINE

## 2024-11-07 PROCEDURE — 96375 TX/PRO/DX INJ NEW DRUG ADDON: CPT

## 2024-11-07 PROCEDURE — 80053 COMPREHEN METABOLIC PANEL: CPT | Performed by: EMERGENCY MEDICINE

## 2024-11-07 PROCEDURE — 99284 EMERGENCY DEPT VISIT MOD MDM: CPT

## 2024-11-07 PROCEDURE — 96376 TX/PRO/DX INJ SAME DRUG ADON: CPT

## 2024-11-07 PROCEDURE — 99285 EMERGENCY DEPT VISIT HI MDM: CPT | Performed by: EMERGENCY MEDICINE

## 2024-11-07 PROCEDURE — 96374 THER/PROPH/DIAG INJ IV PUSH: CPT

## 2024-11-07 PROCEDURE — 74176 CT ABD & PELVIS W/O CONTRAST: CPT

## 2024-11-07 PROCEDURE — 96361 HYDRATE IV INFUSION ADD-ON: CPT

## 2024-11-07 PROCEDURE — 85025 COMPLETE CBC W/AUTO DIFF WBC: CPT | Performed by: EMERGENCY MEDICINE

## 2024-11-07 RX ORDER — MORPHINE SULFATE 4 MG/ML
4 INJECTION, SOLUTION INTRAMUSCULAR; INTRAVENOUS ONCE
Status: COMPLETED | OUTPATIENT
Start: 2024-11-07 | End: 2024-11-07

## 2024-11-07 RX ORDER — POLYETHYLENE GLYCOL 3350 17 G/17G
17 POWDER, FOR SOLUTION ORAL DAILY
Qty: 238 G | Refills: 0 | Status: SHIPPED | OUTPATIENT
Start: 2024-11-07 | End: 2024-11-21

## 2024-11-07 RX ORDER — ONDANSETRON 2 MG/ML
4 INJECTION INTRAMUSCULAR; INTRAVENOUS ONCE
Status: COMPLETED | OUTPATIENT
Start: 2024-11-07 | End: 2024-11-07

## 2024-11-07 RX ADMIN — MORPHINE SULFATE 4 MG: 4 INJECTION INTRAVENOUS at 16:27

## 2024-11-07 RX ADMIN — SODIUM CHLORIDE 1000 ML: 0.9 INJECTION, SOLUTION INTRAVENOUS at 16:26

## 2024-11-07 RX ADMIN — ONDANSETRON 4 MG: 2 INJECTION INTRAMUSCULAR; INTRAVENOUS at 16:26

## 2024-11-07 RX ADMIN — MORPHINE SULFATE 4 MG: 4 INJECTION INTRAVENOUS at 17:13

## 2024-11-07 NOTE — ED PROVIDER NOTES
Time reflects when diagnosis was documented in both MDM as applicable and the Disposition within this note       Time User Action Codes Description Comment    11/7/2024  5:40 PM Giovani Pedraza Add [R10.9] Abdominal pain           ED Disposition       ED Disposition   Discharge    Condition   Stable    Date/Time   Thu Nov 7, 2024  5:40 PM    Comment   Brittanipat Patino discharge to home/self care.                   Assessment & Plan       Medical Decision Making  Based on the history and medical screening exam performed the patient may be at risk for ureteral stone, urinary tract infection, pyelonephritis, gas pain, constipation, other abdominal pain.    Based on the work-up performed in the emergency room which includes physical examination, and which may include laboratory studies and imaging as warranted including advanced imaging such as CT scan or ultrasound, the diagnostic considerations are narrowed to exclude limb or life-threatening process.    The patient is stable for discharge.  CT abdomen pelvis negative for acute ureteral stones.  Not mention by radiologist but I question constipation.  CT scan otherwise negative and lab work benign.  Urinalysis negative.  Will prescribe MiraLAX.  Advise follow-up with primary care physician/OB/GYN on an as-needed basis    Amount and/or Complexity of Data Reviewed  Labs: ordered. Decision-making details documented in ED Course.     Details: WBC 12 otherwise normal  Radiology: ordered and independent interpretation performed. Decision-making details documented in ED Course.     Details: CT renal stone study negative    Risk  Prescription drug management.             Medications   morphine injection 4 mg (4 mg Intravenous Given 11/7/24 1627)   sodium chloride 0.9 % bolus 1,000 mL (1,000 mL Intravenous New Bag 11/7/24 1626)   ondansetron (ZOFRAN) injection 4 mg (4 mg Intravenous Given 11/7/24 1626)   morphine injection 4 mg (4 mg Intravenous Given 11/7/24 1713)       ED  "Risk Strat Scores                           SBIRT 22yo+      Flowsheet Row Most Recent Value   Initial Alcohol Screen: US AUDIT-C     1. How often do you have a drink containing alcohol? 0 Filed at: 2024 1615   2. How many drinks containing alcohol do you have on a typical day you are drinking?  0 Filed at: 2024 1615   3a. Male UNDER 65: How often do you have five or more drinks on one occasion? 0 Filed at: 2024 1615   3b. FEMALE Any Age, or MALE 65+: How often do you have 4 or more drinks on one occassion? 0 Filed at: 2024 1615   Audit-C Score 0 Filed at: 2024 1615   SLIME: How many times in the past year have you...    Used an illegal drug or used a prescription medication for non-medical reasons? Never Filed at: 2024 1615                            History of Present Illness       Chief Complaint   Patient presents with    Flank Pain     R flank pain that started yesterday at 1300. Pt gas urinary frequency and pain        Past Medical History:   Diagnosis Date    C. difficile colitis 2016    COVID-19     2022- pt denies long covid    DUB (dysfunctional uterine bleeding)     H. pylori infection     Hypertension     Hypokalemia     Kidney stone 2023    Left lower quadrant abdominal pain 2020    Migraine     WAGNER (obstructive sleep apnea) 2014    Pancreatitis     Psychiatric disorder     Anxiety    Rectal bleeding     Renal disorder     Rhabdomyolysis     Sleep apnea     resolved with bariatric surgery    Thrombosed external hemorrhoid       Past Surgical History:   Procedure Laterality Date    ABDOMINAL SURGERY      APPENDECTOMY       SECTION      CHOLECYSTECTOMY      COSMETIC SURGERY      \"tummy tuck\"    FL GUIDED CENTRAL VENOUS ACCESS DEVICE INSERTION  2018    FL GUIDED CENTRAL VENOUS ACCESS DEVICE INSERTION  2024    FL RETROGRADE PYELOGRAM  1/3/2023    GASTRIC BYPASS      HYSTERECTOMY      LAPAROSCOPY      WV ANRCT XM SURG REQ ANES " GENERAL SPI/EDRL DX N/A 9/23/2021    Procedure: ANAL EXAM UNDER ANESTHESIA; HEMORRHOIDECTOMY;  Surgeon: Dnoa Jeffries DO;  Location: [facility];  Service: General    NV CYSTO/URETERO W/LITHOTRIPSY &INDWELL STENT INSRT Left 1/3/2023    Procedure: CYSTOSCOPY URETEROSCOPY WITH RETROGRADE PYELOGRAM, BASKET STONE EXTRACTION AND INSERTION STENT URETERAL;  Surgeon: Geovanny Rosales MD;  Location: [facility];  Service: Urology    NV EXC THROMBOSED HEMORRHOID XTRNL N/A 7/8/2022    Procedure: EXCISION OF THROMBOSED EXTERNAL HEMORRHOID, Excision of perianal skin tag, dranage of perianal abscess, anal fistulatomy;  Surgeon: Dona Jeffries DO;  Location: [facility];  Service: General    NV INSJ TUNNELED CTR VAD W/SUBQ PORT AGE 5 YR/> Left 2/9/2024    Procedure: INSERTION VENOUS PORT (PORT-A-CATH);  Surgeon: Ryan Singh DO;  Location: [facility];  Service: General    NV RMVL AMIRAH CTR VAD W/SUBQ PORT/ CTR/PRPH INSJ Right 2/9/2024    Procedure: REMOVAL VENOUS PORT (PORT-A-CATH);  Surgeon: Ryan Singh DO;  Location: [facility];  Service: General    TUNNELED VENOUS PORT PLACEMENT N/A 12/21/2018    Procedure: INSERTION VENOUS PORT (PORT-A-CATH);  Surgeon: Ryan Singh DO;  Location: [facility];  Service: General      Family History   Problem Relation Age of Onset    No Known Problems Mother     Hypertension Father     Diabetes Father     Urolithiasis Father     Prostate cancer Father     Diabetes Maternal Grandfather       Social History     Tobacco Use    Smoking status: Never    Smokeless tobacco: Never   Vaping Use    Vaping status: Never Used   Substance Use Topics    Alcohol use: Not Currently    Drug use: Not Currently     Types: Marijuana     Comment: Medical marijuana      E-Cigarette/Vaping    E-Cigarette Use Never User       E-Cigarette/Vaping Substances    Nicotine No     THC No     CBD No     Flavoring No     Other No     Unknown No       I have reviewed and agree with the history as  documented.     Patient with prior history of gastric bypass, prior history of kidney stones, prior history of appendectomy and cholecystectomy, complains of 1 day of right-sided abdominal pain and right-sided flank pain.  No fevers but patient does complain of nausea.  Patient admits to dysuria.      History provided by:  Patient   used: No    Flank Pain  Pain location:  RUQ, RLQ and R flank  Pain quality: not aching    Pain radiates to:  Does not radiate  Pain severity:  Severe  Onset quality:  Gradual  Duration:  1 day  Timing:  Constant  Progression:  Unchanged  Chronicity:  New  Relieved by:  Nothing  Worsened by:  Nothing  Ineffective treatments:  None tried  Associated symptoms: dysuria and nausea    Associated symptoms: no chest pain, no chills, no constipation, no cough, no diarrhea, no fever, no hematemesis, no hematochezia, no hematuria, no shortness of breath, no sore throat and no vomiting        Review of Systems   Constitutional:  Negative for chills and fever.   HENT:  Negative for ear pain, hearing loss, sore throat, trouble swallowing and voice change.    Eyes:  Negative for pain and discharge.   Respiratory:  Negative for cough, shortness of breath and wheezing.    Cardiovascular:  Negative for chest pain and palpitations.   Gastrointestinal:  Positive for nausea. Negative for abdominal pain, blood in stool, constipation, diarrhea, hematemesis, hematochezia and vomiting.   Genitourinary:  Positive for dysuria and flank pain. Negative for frequency and hematuria.   Musculoskeletal:  Negative for joint swelling, neck pain and neck stiffness.   Skin:  Negative for rash and wound.   Neurological:  Negative for dizziness, seizures, syncope, facial asymmetry and headaches.   Psychiatric/Behavioral:  Negative for hallucinations, self-injury and suicidal ideas.    All other systems reviewed and are negative.          Objective       ED Triage Vitals [11/07/24 1614]   Temperature Pulse  Blood Pressure Respirations SpO2 Patient Position - Orthostatic VS   97.9 °F (36.6 °C) 103 119/78 16 99 % Sitting      Temp Source Heart Rate Source BP Location FiO2 (%) Pain Score    Tympanic Monitor Right arm -- 9      Vitals      Date and Time Temp Pulse SpO2 Resp BP Pain Score FACES Pain Rating User   11/07/24 1713 -- -- -- -- -- 9 -- SR   11/07/24 1626 -- -- -- -- -- 9 -- MB   11/07/24 1614 97.9 °F (36.6 °C) 103 99 % 16 119/78 9 -- LAK            Physical Exam  Vitals and nursing note reviewed.   Constitutional:       General: She is not in acute distress.     Appearance: She is well-developed.   HENT:      Head: Normocephalic and atraumatic.      Right Ear: External ear normal.      Left Ear: External ear normal.   Eyes:      General: No scleral icterus.        Right eye: No discharge.         Left eye: No discharge.      Extraocular Movements: Extraocular movements intact.      Conjunctiva/sclera: Conjunctivae normal.   Cardiovascular:      Rate and Rhythm: Normal rate and regular rhythm.      Heart sounds: Normal heart sounds. No murmur heard.  Pulmonary:      Effort: Pulmonary effort is normal.      Breath sounds: Normal breath sounds. No wheezing or rales.   Abdominal:      General: Bowel sounds are normal. There is no distension.      Palpations: Abdomen is soft.      Tenderness: There is abdominal tenderness in the right upper quadrant and right lower quadrant. There is right CVA tenderness. There is no guarding or rebound. Negative signs include Argueta's sign and McBurney's sign.   Musculoskeletal:         General: No deformity. Normal range of motion.      Cervical back: Normal range of motion and neck supple.   Skin:     General: Skin is warm and dry.      Findings: No rash.   Neurological:      General: No focal deficit present.      Mental Status: She is alert and oriented to person, place, and time.      Cranial Nerves: No cranial nerve deficit.   Psychiatric:         Mood and Affect: Mood normal.          Behavior: Behavior normal.         Thought Content: Thought content normal.         Judgment: Judgment normal.         Results Reviewed       Procedure Component Value Units Date/Time    Comprehensive metabolic panel [[id number]]  (Abnormal) Collected: 11/07/24 1624    Lab Status: Final result Specimen: Blood from Line Updated: 11/07/24 1654     Sodium 133 mmol/L      Potassium 4.3 mmol/L      Chloride 107 mmol/L      CO2 18 mmol/L      ANION GAP 8 mmol/L      BUN 17 mg/dL      Creatinine 1.04 mg/dL      Glucose 96 mg/dL      Calcium 8.1 mg/dL      AST 19 U/L      ALT 14 U/L      Alkaline Phosphatase 64 U/L      Total Protein 7.7 g/dL      Albumin 4.1 g/dL      Total Bilirubin 0.22 mg/dL      eGFR 67 ml/min/1.73sq m     Narrative:      National Kidney Disease Foundation guidelines for Chronic Kidney Disease (CKD):     Stage 1 with normal or high GFR (GFR > 90 mL/min/1.73 square meters)    Stage 2 Mild CKD (GFR = 60-89 mL/min/1.73 square meters)    Stage 3A Moderate CKD (GFR = 45-59 mL/min/1.73 square meters)    Stage 3B Moderate CKD (GFR = 30-44 mL/min/1.73 square meters)    Stage 4 Severe CKD (GFR = 15-29 mL/min/1.73 square meters)    Stage 5 End Stage CKD (GFR <15 mL/min/1.73 square meters)  Note: GFR calculation is accurate only with a steady state creatinine    UA w Reflex to Microscopic w Reflex to Culture [[id number]] Collected: 11/07/24 1628    Lab Status: Final result Specimen: Urine, Clean Catch Updated: 11/07/24 1635     Color, UA Yellow     Clarity, UA Clear     Specific Gravity, UA 1.010     pH, UA 6.5     Leukocytes, UA Negative     Nitrite, UA Negative     Protein, UA Negative mg/dl      Glucose, UA Negative mg/dl      Ketones, UA Negative mg/dl      Urobilinogen, UA 0.2 E.U./dl      Bilirubin, UA Negative     Occult Blood, UA Negative    CBC and differential [[id number]]  (Abnormal) Collected: 11/07/24 1624    Lab Status: Final result Specimen: Blood from Line Updated: 11/07/24 1634     WBC 12.00  "Thousand/uL      RBC 4.43 Million/uL      Hemoglobin 11.3 g/dL      Hematocrit 37.3 %      MCV 84 fL      MCH 25.5 pg      MCHC 30.3 g/dL      RDW 15.9 %      MPV 9.6 fL      Platelets 353 Thousands/uL      nRBC 0 /100 WBCs      Segmented % 78 %      Immature Grans % 0 %      Lymphocytes % 13 %      Monocytes % 7 %      Eosinophils Relative 1 %      Basophils Relative 1 %      Absolute Neutrophils 9.45 Thousands/µL      Absolute Immature Grans 0.04 Thousand/uL      Absolute Lymphocytes 1.55 Thousands/µL      Absolute Monocytes 0.81 Thousand/µL      Eosinophils Absolute 0.06 Thousand/µL      Basophils Absolute 0.09 Thousands/µL             CT renal stone study abdomen pelvis wo contrast   Final Interpretation by Сергей Vance DO (11/07 1703)   Bilateral nonobstructing renal collecting system stones. No CT evidence of renal colic.         Workstation performed: [id number]             Procedures    ED Medication and Procedure Management   Prior to Admission Medications   Prescriptions Last Dose Informant Patient Reported? Taking?   EPINEPHrine (EPIPEN) 0.3 mg/0.3 mL SOAJ  Self Yes No   Sig: Inject 0.3 mg into a muscle   Galcanezumab-gnlm 120 MG/ML SOAJ   No No   Sig: Inject 120 mg under the skin every 30 (thirty) days Following the first month loading dose of 2 pens.   SUMAtriptan Succinate 6 MG/0.5ML SOAJ  Self No No   Sig: Inject 0.5 mL (6 mg total) under the skin as needed (migraine) May repeat once in 1 hour if needed. Max 12 mg/day, Max 3 days per week   Syringe/Needle, Disp, (SYRINGE 3CC/25GX5/8\") 25G X 5/8\" 3 ML MISC  Self No No   Sig: Use once monthly for B12 injection.   amitriptyline (ELAVIL) 50 mg tablet  Self No No   Sig: Take 1.5 tablets (75 mg total) by mouth daily at bedtime   celecoxib (CeleBREX) 200 mg capsule  Self Yes No   Sig: TAKE 1 CAPSULE (200 MG TOTAL) BY MOUTH DAILY FOR 5 DAYS. G   Patient not taking: Reported on 11/5/2024   cyanocobalamin 1,000 mcg/mL  Self No No   Sig: Inject 1 mL (1,000 " mcg total) into a muscle every 30 (thirty) days Next dose due 2023   Patient taking differently: Inject 1,000 mcg into a muscle every 30 (thirty) days Next dose due 24   ergocalciferol (ERGOCALCIFEROL) 1.25 MG (48345 UT) capsule  Self No No   Sig: Take 1 capsule (50,000 Units total) by mouth 2 (two) times a week  and    escitalopram (LEXAPRO) 20 mg tablet  Self Yes No   Si mg daily at bedtime   meclizine (ANTIVERT) 12.5 MG tablet  Self No No   Sig: Take 1 tablet (12.5 mg total) by mouth 3 (three) times a day as needed for dizziness Do not take daily   ondansetron (ZOFRAN-ODT) 4 mg disintegrating tablet  Self Yes No   Sig: Take 4 mg by mouth every 6 (six) hours as needed for nausea or vomiting   potassium chloride 20 MEQ/50ML  Self No No   Sig: Inject 200 mL (80 mEq total) into a catheter in a vein daily 80 meq in 1 litre bag to be infused daily over 8 hours. Continue as ordered prior to admission   spironolactone (ALDACTONE) 25 mg tablet  Self No No   Sig: Take 0.5 tablets (12.5 mg total) by mouth daily   vitamin A 3 MG (65976 UT) capsule  Self Yes No   Sig: Take 10,000 Units by mouth 2 (two) times a day   vitamin k 100 MCG tablet  Self Yes No   Sig: Take 1 tablet by mouth daily      Facility-Administered Medications: None     Patient's Medications   Discharge Prescriptions    POLYETHYLENE GLYCOL (MIRALAX) 17 G PACKET    Take 17 g by mouth daily for 14 days       Start Date: 2024 End Date: 2024       Order Dose: 17 g       Quantity: 238 g    Refills: 0     No discharge procedures on file.  ED SEPSIS DOCUMENTATION   Time reflects when diagnosis was documented in both MDM as applicable and the Disposition within this note       Time User Action Codes Description Comment    2024  5:40 PM Giovani Pedraza Add [R10.9] Abdominal pain                  Giovani Pedraza MD  [phone]

## 2024-11-08 NOTE — TELEPHONE ENCOUNTER
Received denial letter for Emgality injection. Denial letter scanned in chart. See encounter dated 11/7/24.    Per insurance pt needs to have tried and failed one of the following drugs from the following classes:  Beta-blockers: (propranolol, metoprolol, timolol)  2. Anticonvulsants (topiramate, valproic acid, divalproex).     Per chart review, pt did not try and fail a beta-blocker.   Patient tried Topiramate: but medication was this was d/c due to renal stones    Please review and advise.

## 2024-11-08 NOTE — TELEPHONE ENCOUNTER
Appeal please. This patient is on amitriptyline. I would not recommend the addition of propanolol (or other betablockers) due to risk of hypotension/orthostasis.

## 2024-11-08 NOTE — TELEPHONE ENCOUNTER
Called [facility]. Spoke with Ashley.     Advised her that information regarding pt tried and failed Topiramate was already noted on original PA. She says sometimes clinical review team misses information. Rep will make note that pt has tried and failed Topiramate. When asked if she needs additional information of why beta-blocker not recommended, rep says she only needs one or the other.    Additional information submitted. PA remains as urgent. TAT 24 hours.    Case ID: [id number]    Awaiting determination.

## 2024-11-11 NOTE — TELEPHONE ENCOUNTER
Per letter from [facility], Emgality is approved from 11/8/24 - 5/8/25.     Called [facility]. Tech was able to receive paid claim.     Pt made aware of above.

## 2024-11-18 ENCOUNTER — APPOINTMENT (OUTPATIENT)
Dept: LAB | Facility: CLINIC | Age: 39
End: 2024-11-18
Payer: COMMERCIAL

## 2024-11-18 DIAGNOSIS — E87.6 HYPOKALEMIA: ICD-10-CM

## 2024-11-18 LAB
ANION GAP SERPL CALCULATED.3IONS-SCNC: 10 MMOL/L (ref 4–13)
BUN SERPL-MCNC: 12 MG/DL (ref 5–25)
CALCIUM SERPL-MCNC: 8.4 MG/DL (ref 8.4–10.2)
CHLORIDE SERPL-SCNC: 103 MMOL/L (ref 96–108)
CO2 SERPL-SCNC: 22 MMOL/L (ref 21–32)
CREAT SERPL-MCNC: 1.16 MG/DL (ref 0.6–1.3)
GFR SERPL CREATININE-BSD FRML MDRD: 59 ML/MIN/1.73SQ M
GLUCOSE SERPL-MCNC: 87 MG/DL (ref 65–140)
POTASSIUM SERPL-SCNC: 3.2 MMOL/L (ref 3.5–5.3)
SODIUM SERPL-SCNC: 135 MMOL/L (ref 135–147)

## 2024-11-18 PROCEDURE — 36415 COLL VENOUS BLD VENIPUNCTURE: CPT

## 2024-11-18 PROCEDURE — 80048 BASIC METABOLIC PNL TOTAL CA: CPT

## 2024-11-19 ENCOUNTER — RESULTS FOLLOW-UP (OUTPATIENT)
Age: 39
End: 2024-11-19

## 2024-11-27 ENCOUNTER — TELEPHONE (OUTPATIENT)
Age: 39
End: 2024-11-27

## 2024-11-27 ENCOUNTER — APPOINTMENT (OUTPATIENT)
Dept: LAB | Facility: HOSPITAL | Age: 39
End: 2024-11-27
Payer: COMMERCIAL

## 2024-11-27 DIAGNOSIS — E87.6 HYPOKALEMIA: ICD-10-CM

## 2024-11-27 LAB
ANION GAP SERPL CALCULATED.3IONS-SCNC: 5 MMOL/L (ref 4–13)
BUN SERPL-MCNC: 10 MG/DL (ref 5–25)
CALCIUM SERPL-MCNC: 8.5 MG/DL (ref 8.4–10.2)
CHLORIDE SERPL-SCNC: 109 MMOL/L (ref 96–108)
CO2 SERPL-SCNC: 22 MMOL/L (ref 21–32)
CREAT SERPL-MCNC: 1.1 MG/DL (ref 0.6–1.3)
GFR SERPL CREATININE-BSD FRML MDRD: 62 ML/MIN/1.73SQ M
GLUCOSE SERPL-MCNC: 79 MG/DL (ref 65–140)
POTASSIUM SERPL-SCNC: 4.1 MMOL/L (ref 3.5–5.3)
SODIUM SERPL-SCNC: 136 MMOL/L (ref 135–147)

## 2024-11-27 PROCEDURE — 80048 BASIC METABOLIC PNL TOTAL CA: CPT

## 2024-11-27 PROCEDURE — 36415 COLL VENOUS BLD VENIPUNCTURE: CPT

## 2024-11-27 NOTE — TELEPHONE ENCOUNTER
I called and left a detailed VM for Brittani regarding the following:    ----- Message from Juan Carlos Ricketts DO sent at 11/27/2024  9:24 AM EST -----  Blood work looks good, potassium level is excellent at 4.1.  Please wish her happy Thanksgiving from us      I requested a call back if she has any questions or concerns.

## 2024-11-27 NOTE — TELEPHONE ENCOUNTER
Pt called to ask if Dr. Ricketts is doing primary care and if so she would like information to become a pt.

## 2024-12-02 ENCOUNTER — APPOINTMENT (OUTPATIENT)
Dept: LAB | Facility: CLINIC | Age: 39
End: 2024-12-02
Payer: COMMERCIAL

## 2024-12-02 DIAGNOSIS — E87.6 HYPOKALEMIA: ICD-10-CM

## 2024-12-02 LAB
ANION GAP SERPL CALCULATED.3IONS-SCNC: 13 MMOL/L (ref 4–13)
BUN SERPL-MCNC: 14 MG/DL (ref 5–25)
CALCIUM SERPL-MCNC: 8.8 MG/DL (ref 8.4–10.2)
CHLORIDE SERPL-SCNC: 102 MMOL/L (ref 96–108)
CO2 SERPL-SCNC: 20 MMOL/L (ref 21–32)
CREAT SERPL-MCNC: 1.25 MG/DL (ref 0.6–1.3)
GFR SERPL CREATININE-BSD FRML MDRD: 53 ML/MIN/1.73SQ M
GLUCOSE P FAST SERPL-MCNC: 102 MG/DL (ref 65–99)
POTASSIUM SERPL-SCNC: 3.5 MMOL/L (ref 3.5–5.3)
SODIUM SERPL-SCNC: 135 MMOL/L (ref 135–147)

## 2024-12-02 PROCEDURE — 36415 COLL VENOUS BLD VENIPUNCTURE: CPT

## 2024-12-02 PROCEDURE — 80048 BASIC METABOLIC PNL TOTAL CA: CPT

## 2024-12-03 NOTE — TELEPHONE ENCOUNTER
----- Message from Rafael Chong PA-C sent at 12/3/2024 10:42 AM EST -----  I reviewed lab results and she has stable kidney function.  Her potassium level is within normal limits but borderline low at 3.5

## 2024-12-03 NOTE — TELEPHONE ENCOUNTER
Tried reaching patient, no answer.  Left message on voicemail for patient, stable kidney function.  Her potassium level is within normal limits but borderline low at 3.5.  She should contact office with any questions/concerns.

## 2024-12-09 ENCOUNTER — APPOINTMENT (OUTPATIENT)
Dept: LAB | Facility: CLINIC | Age: 39
End: 2024-12-09
Payer: COMMERCIAL

## 2024-12-09 DIAGNOSIS — E87.6 HYPOKALEMIA: ICD-10-CM

## 2024-12-09 LAB
ANION GAP SERPL CALCULATED.3IONS-SCNC: 12 MMOL/L (ref 4–13)
BUN SERPL-MCNC: 14 MG/DL (ref 5–25)
CALCIUM SERPL-MCNC: 8.9 MG/DL (ref 8.4–10.2)
CHLORIDE SERPL-SCNC: 104 MMOL/L (ref 96–108)
CO2 SERPL-SCNC: 21 MMOL/L (ref 21–32)
CREAT SERPL-MCNC: 1.12 MG/DL (ref 0.6–1.3)
GFR SERPL CREATININE-BSD FRML MDRD: 61 ML/MIN/1.73SQ M
GLUCOSE SERPL-MCNC: 85 MG/DL (ref 65–140)
POTASSIUM SERPL-SCNC: 3.6 MMOL/L (ref 3.5–5.3)
SODIUM SERPL-SCNC: 137 MMOL/L (ref 135–147)

## 2024-12-09 PROCEDURE — 36415 COLL VENOUS BLD VENIPUNCTURE: CPT

## 2024-12-09 PROCEDURE — 80048 BASIC METABOLIC PNL TOTAL CA: CPT

## 2024-12-11 DIAGNOSIS — R42 VERTIGO: ICD-10-CM

## 2024-12-11 DIAGNOSIS — G43.709 CHRONIC MIGRAINE WITHOUT AURA WITHOUT STATUS MIGRAINOSUS, NOT INTRACTABLE: ICD-10-CM

## 2024-12-13 RX ORDER — MECLIZINE HCL 12.5 MG 12.5 MG/1
12.5 TABLET ORAL 3 TIMES DAILY PRN
Qty: 30 TABLET | Refills: 0 | Status: SHIPPED | OUTPATIENT
Start: 2024-12-13

## 2024-12-13 RX ORDER — CEFUROXIME AXETIL 250 MG/1
0.5 TABLET ORAL AS NEEDED
Qty: 3 ML | Refills: 0 | Status: SHIPPED | OUTPATIENT
Start: 2024-12-13

## 2024-12-13 NOTE — TELEPHONE ENCOUNTER
Pt called back in to [facility] is [facility] -   [address], [place] [address]. Phone number [phone]

## 2024-12-17 ENCOUNTER — APPOINTMENT (OUTPATIENT)
Dept: LAB | Facility: CLINIC | Age: 39
End: 2024-12-17
Payer: COMMERCIAL

## 2024-12-17 DIAGNOSIS — E87.6 HYPOKALEMIA: ICD-10-CM

## 2024-12-17 LAB
ANION GAP SERPL CALCULATED.3IONS-SCNC: 9 MMOL/L (ref 4–13)
BUN SERPL-MCNC: 17 MG/DL (ref 5–25)
CALCIUM SERPL-MCNC: 9 MG/DL (ref 8.4–10.2)
CHLORIDE SERPL-SCNC: 105 MMOL/L (ref 96–108)
CO2 SERPL-SCNC: 21 MMOL/L (ref 21–32)
CREAT SERPL-MCNC: 1.08 MG/DL (ref 0.6–1.3)
GFR SERPL CREATININE-BSD FRML MDRD: 64 ML/MIN/1.73SQ M
GLUCOSE P FAST SERPL-MCNC: 73 MG/DL (ref 65–99)
POTASSIUM SERPL-SCNC: 3.9 MMOL/L (ref 3.5–5.3)
SODIUM SERPL-SCNC: 135 MMOL/L (ref 135–147)

## 2024-12-17 PROCEDURE — 80048 BASIC METABOLIC PNL TOTAL CA: CPT

## 2024-12-17 PROCEDURE — 36415 COLL VENOUS BLD VENIPUNCTURE: CPT

## 2024-12-23 ENCOUNTER — TELEPHONE (OUTPATIENT)
Dept: FAMILY MEDICINE CLINIC | Facility: CLINIC | Age: 39
End: 2024-12-23

## 2024-12-23 ENCOUNTER — APPOINTMENT (OUTPATIENT)
Dept: LAB | Facility: CLINIC | Age: 39
End: 2024-12-23
Payer: COMMERCIAL

## 2024-12-23 DIAGNOSIS — E87.6 HYPOKALEMIA: ICD-10-CM

## 2024-12-23 LAB
ANION GAP SERPL CALCULATED.3IONS-SCNC: 10 MMOL/L (ref 4–13)
BUN SERPL-MCNC: 16 MG/DL (ref 5–25)
CALCIUM SERPL-MCNC: 9.1 MG/DL (ref 8.4–10.2)
CHLORIDE SERPL-SCNC: 102 MMOL/L (ref 96–108)
CO2 SERPL-SCNC: 21 MMOL/L (ref 21–32)
CREAT SERPL-MCNC: 1.18 MG/DL (ref 0.6–1.3)
GFR SERPL CREATININE-BSD FRML MDRD: 57 ML/MIN/1.73SQ M
GLUCOSE SERPL-MCNC: 197 MG/DL (ref 65–140)
POTASSIUM SERPL-SCNC: 3.5 MMOL/L (ref 3.5–5.3)
SODIUM SERPL-SCNC: 133 MMOL/L (ref 135–147)

## 2024-12-23 PROCEDURE — 80048 BASIC METABOLIC PNL TOTAL CA: CPT

## 2024-12-23 PROCEDURE — 36415 COLL VENOUS BLD VENIPUNCTURE: CPT

## 2024-12-23 NOTE — TELEPHONE ENCOUNTER
Left message for patient to call our office to discuss her insurance. We do not participate with [employer] at our location.

## 2024-12-23 NOTE — TELEPHONE ENCOUNTER
Pt returned call looking for elisabeth warm transferred the call as per chart message call was answered by Sherie and placed on hold got connected to Elisabeth she took patient call to assist. Thanks

## 2025-01-02 ENCOUNTER — APPOINTMENT (OUTPATIENT)
Dept: LAB | Facility: CLINIC | Age: 40
End: 2025-01-02
Payer: COMMERCIAL

## 2025-01-02 ENCOUNTER — OFFICE VISIT (OUTPATIENT)
Dept: FAMILY MEDICINE CLINIC | Facility: CLINIC | Age: 40
End: 2025-01-02
Payer: COMMERCIAL

## 2025-01-02 VITALS
SYSTOLIC BLOOD PRESSURE: 102 MMHG | WEIGHT: 134.4 LBS | DIASTOLIC BLOOD PRESSURE: 70 MMHG | BODY MASS INDEX: 24.73 KG/M2 | TEMPERATURE: 100.6 F | HEIGHT: 62 IN | OXYGEN SATURATION: 97 % | RESPIRATION RATE: 18 BRPM | HEART RATE: 89 BPM

## 2025-01-02 DIAGNOSIS — Z00.00 ENCOUNTER FOR MEDICAL EXAMINATION TO ESTABLISH CARE: Primary | ICD-10-CM

## 2025-01-02 DIAGNOSIS — E87.6 HYPOKALEMIA: ICD-10-CM

## 2025-01-02 DIAGNOSIS — J02.9 SORE THROAT: ICD-10-CM

## 2025-01-02 DIAGNOSIS — Z98.84 HISTORY OF GASTRIC BYPASS: ICD-10-CM

## 2025-01-02 DIAGNOSIS — G43.009 MIGRAINE WITHOUT AURA AND WITHOUT STATUS MIGRAINOSUS, NOT INTRACTABLE: ICD-10-CM

## 2025-01-02 DIAGNOSIS — Z23 ENCOUNTER FOR IMMUNIZATION: ICD-10-CM

## 2025-01-02 LAB
ANION GAP SERPL CALCULATED.3IONS-SCNC: 8 MMOL/L (ref 4–13)
BUN SERPL-MCNC: 14 MG/DL (ref 5–25)
CALCIUM SERPL-MCNC: 8.5 MG/DL (ref 8.4–10.2)
CHLORIDE SERPL-SCNC: 106 MMOL/L (ref 96–108)
CO2 SERPL-SCNC: 21 MMOL/L (ref 21–32)
CREAT SERPL-MCNC: 1.05 MG/DL (ref 0.6–1.3)
GFR SERPL CREATININE-BSD FRML MDRD: 66 ML/MIN/1.73SQ M
GLUCOSE SERPL-MCNC: 101 MG/DL (ref 65–140)
POTASSIUM SERPL-SCNC: 3.5 MMOL/L (ref 3.5–5.3)
SODIUM SERPL-SCNC: 135 MMOL/L (ref 135–147)

## 2025-01-02 PROCEDURE — 36415 COLL VENOUS BLD VENIPUNCTURE: CPT

## 2025-01-02 PROCEDURE — 80048 BASIC METABOLIC PNL TOTAL CA: CPT

## 2025-01-02 PROCEDURE — 90715 TDAP VACCINE 7 YRS/> IM: CPT

## 2025-01-02 RX ORDER — GABAPENTIN 100 MG/1
100 CAPSULE ORAL 3 TIMES DAILY
COMMUNITY
Start: 2024-11-18 | End: 2025-11-18

## 2025-01-03 NOTE — PROGRESS NOTES
Assessment/Plan:      Diagnoses and all orders for this visit:    Encounter for medical examination to establish care  -     Hemoglobin A1C; Future  -     Lipid panel; Future    Encounter for immunization  -     Tdap vaccine greater than or equal to 8yo IM    Migraine without aura and without status migrainosus, not intractable  Comments:  Well controlled with amytriptyline prophylactically and sumitriptan  Continue to monitor    History of gastric bypass  Comments:  Follows with dietician and nephrology for nutrients and electrolyte management  Continue to monitor for any deficencies    Hypokalemia  Comments:  History of refractory hypokalemia, currently uses permenant IV access for 80 mEq potassium daily Follows with nephrology regularly  Continue to monitor    Sore throat  Comments:  patient presented with sore throat, congestion, rhinorrhea and fever.   Advised to Increase fluid intake and rest  Treat fever with tyelenol    Other orders  -     gabapentin (NEURONTIN) 100 mg capsule; Take 100 mg by mouth Three times a day          Subjective:     Patient ID: Brittani Patino is a 39 y.o. female with PMH of [place] en Y gastric bypass/ refractory hypokalemia/migraines/ Stage 2 CKD/chronic anemia presented to the clinic for establishment of care. Patient's only concern today was sore throat and fever. She has a recent exposure to sick contact with similar symptoms and has received her flu shot. She is otherwise doing well.   Patient follows with nephrologist and dietician for electrolytes and nutrition management.  Patient agreed to get her tetanus vaccination in office today        Review of Systems   Constitutional:  Positive for fever. Negative for activity change, fatigue and unexpected weight change.   HENT:  Positive for congestion, rhinorrhea, sneezing and sore throat. Negative for ear discharge, mouth sores and trouble swallowing.    Eyes:  Negative for discharge, redness and visual disturbance.   Respiratory:   Negative for cough, chest tightness, wheezing and stridor.    Cardiovascular:  Negative for chest pain, palpitations and leg swelling.   Gastrointestinal:  Negative for abdominal distention, abdominal pain, diarrhea and vomiting.   Endocrine: Negative.    Genitourinary: Negative.    Musculoskeletal:  Positive for arthralgias and myalgias.   Skin:  Negative for color change and rash.   Allergic/Immunologic: Negative.    Neurological: Negative.  Negative for dizziness, speech difficulty, weakness, light-headedness and headaches.   Hematological: Negative.  Negative for adenopathy.   Psychiatric/Behavioral: Negative.  Negative for agitation, behavioral problems, confusion and dysphoric mood.          Objective:     Physical Exam  HENT:      Head: Normocephalic and atraumatic.      Right Ear: Tympanic membrane and ear canal normal.      Left Ear: Tympanic membrane and ear canal normal.      Nose: Congestion and rhinorrhea present.      Mouth/Throat:      Pharynx: Oropharynx is clear. Uvula midline. Posterior oropharyngeal erythema present. No oropharyngeal exudate.      Tonsils: No tonsillar exudate or tonsillar abscesses. 0 on the right. 0 on the left.   Eyes:      Conjunctiva/sclera: Conjunctivae normal.      Pupils: Pupils are equal, round, and reactive to light.   Cardiovascular:      Rate and Rhythm: Tachycardia present.      Heart sounds: Normal heart sounds.   Pulmonary:      Effort: Pulmonary effort is normal.      Breath sounds: Normal breath sounds.   Abdominal:      General: Bowel sounds are normal.      Palpations: Abdomen is soft.   Musculoskeletal:      Cervical back: Normal range of motion and neck supple.   Lymphadenopathy:      Cervical: No cervical adenopathy.   Skin:     General: Skin is warm.      Capillary Refill: Capillary refill takes less than 2 seconds.   Neurological:      General: No focal deficit present.      Mental Status: She is alert and oriented to person, place, and time.   Psychiatric:          Mood and Affect: Mood normal.         Behavior: Behavior normal.

## 2025-01-05 DIAGNOSIS — G43.709 CHRONIC MIGRAINE WITHOUT AURA WITHOUT STATUS MIGRAINOSUS, NOT INTRACTABLE: ICD-10-CM

## 2025-01-05 DIAGNOSIS — R42 VERTIGO: ICD-10-CM

## 2025-01-06 ENCOUNTER — TELEPHONE (OUTPATIENT)
Dept: NEUROLOGY | Facility: CLINIC | Age: 40
End: 2025-01-06

## 2025-01-07 ENCOUNTER — NURSE TRIAGE (OUTPATIENT)
Age: 40
End: 2025-01-07

## 2025-01-07 ENCOUNTER — APPOINTMENT (OUTPATIENT)
Dept: LAB | Facility: CLINIC | Age: 40
End: 2025-01-07
Payer: COMMERCIAL

## 2025-01-07 DIAGNOSIS — E87.6 HYPOKALEMIA: ICD-10-CM

## 2025-01-07 DIAGNOSIS — Z00.00 ENCOUNTER FOR MEDICAL EXAMINATION TO ESTABLISH CARE: ICD-10-CM

## 2025-01-07 LAB
ANION GAP SERPL CALCULATED.3IONS-SCNC: 16 MMOL/L (ref 4–13)
BUN SERPL-MCNC: 22 MG/DL (ref 5–25)
CALCIUM SERPL-MCNC: 9.2 MG/DL (ref 8.4–10.2)
CHLORIDE SERPL-SCNC: 98 MMOL/L (ref 96–108)
CHOLEST SERPL-MCNC: 208 MG/DL (ref ?–200)
CO2 SERPL-SCNC: 21 MMOL/L (ref 21–32)
CREAT SERPL-MCNC: 1.13 MG/DL (ref 0.6–1.3)
EST. AVERAGE GLUCOSE BLD GHB EST-MCNC: 114 MG/DL
GFR SERPL CREATININE-BSD FRML MDRD: 60 ML/MIN/1.73SQ M
GLUCOSE SERPL-MCNC: 102 MG/DL (ref 65–140)
HBA1C MFR BLD: 5.6 %
HDLC SERPL-MCNC: 56 MG/DL
LDLC SERPL CALC-MCNC: 127 MG/DL (ref 0–100)
NONHDLC SERPL-MCNC: 152 MG/DL
POTASSIUM SERPL-SCNC: 2.8 MMOL/L (ref 3.5–5.3)
SODIUM SERPL-SCNC: 135 MMOL/L (ref 135–147)
TRIGL SERPL-MCNC: 125 MG/DL (ref ?–150)

## 2025-01-07 PROCEDURE — 80061 LIPID PANEL: CPT

## 2025-01-07 PROCEDURE — 83036 HEMOGLOBIN GLYCOSYLATED A1C: CPT

## 2025-01-07 PROCEDURE — 80048 BASIC METABOLIC PNL TOTAL CA: CPT

## 2025-01-07 PROCEDURE — 36415 COLL VENOUS BLD VENIPUNCTURE: CPT

## 2025-01-07 RX ORDER — CEFUROXIME AXETIL 250 MG/1
0.5 TABLET ORAL AS NEEDED
Qty: 3 ML | Refills: 1 | Status: SHIPPED | OUTPATIENT
Start: 2025-01-07

## 2025-01-07 RX ORDER — MECLIZINE HCL 12.5 MG 12.5 MG/1
12.5 TABLET ORAL 3 TIMES DAILY PRN
Qty: 30 TABLET | Refills: 1 | Status: SHIPPED | OUTPATIENT
Start: 2025-01-07

## 2025-01-07 NOTE — TELEPHONE ENCOUNTER
"Reason for Disposition   Patient sounds very sick or weak to the triager    Answer Assessment - Initial Assessment Questions  1. DESCRIPTION: \"Describe how you are feeling.\"      Weak/ fatigues. Nausea at times, Experiencing numbness and tingling in r hand and her entire face. States potassium came back at 2.8 this morning  2. SEVERITY: \"How bad is it?\"  \"Can you stand and walk?\"      Yes bet becoming weak/ tired  3. ONSET: \"When did these symptoms begin?\" (e.g., hours, days, weeks, months)      Yesterday   4. CAUSE: \"What do you think is causing the weakness or fatigue?\" (e.g., not drinking enough fluids, medical problem, trouble sleeping)      Potassium at 2.8  5. NEW MEDICINES:  \"Have you started on any new medicines recently?\" (e.g., opioid pain medicines, benzodiazepines, muscle relaxants, antidepressants, antihistamines, neuroleptics, beta blockers)      Denies   6. OTHER SYMPTOMS: \"Do you have any other symptoms?\" (e.g., chest pain, fever, cough, SOB, vomiting, diarrhea, bleeding, other areas of pain)      Denies states chest pain typically starts when potassium below 2.6    Protocols used: Weakness (Generalized) and Fatigue-Adult-AH    "

## 2025-01-07 NOTE — TELEPHONE ENCOUNTER
Patient sent Patient advise request in around 5:28pm stating that her potassium was at 2.8 and she was experiencing symptoms of facial numbness and tingling with R arm numbness and tingling . States these symptoms started yesterday, and now she is fatigued, feeling weak and nauseas. Denies chest pain at the moment but states when potassium hits 2.6 she usually starts with chest pain symptoms. D/T Patient symptoms advised she go to the Emergency room immediately. She stated usually the doctor is able to get her in as a direct admit for potassium infusion. Asking if we can contact provider to see if they would be able to do this for her? Message sent to on call- Patient will be going to [facility] EMERGENCY ROOM- advised that she go now due to symptoms- she verbalized understanding and stated she is on her way right when she is off the phone with me. She is asking that on call provider notify her if they are able to do direct admit. Message sent to on call

## 2025-01-08 ENCOUNTER — HOSPITAL ENCOUNTER (INPATIENT)
Facility: HOSPITAL | Age: 40
LOS: 3 days | Discharge: HOME | DRG: 641 | End: 2025-01-11
Attending: INTERNAL MEDICINE | Admitting: INTERNAL MEDICINE
Payer: COMMERCIAL

## 2025-01-08 ENCOUNTER — RESULTS FOLLOW-UP (OUTPATIENT)
Dept: FAMILY MEDICINE CLINIC | Facility: CLINIC | Age: 40
End: 2025-01-08

## 2025-01-08 DIAGNOSIS — E87.6 HYPOKALEMIA: Primary | ICD-10-CM

## 2025-01-08 PROBLEM — N18.2 STAGE 2 CHRONIC KIDNEY DISEASE: Status: ACTIVE | Noted: 2025-01-08

## 2025-01-08 PROBLEM — N18.31 STAGE 3A CHRONIC KIDNEY DISEASE (CKD) (HCC): Status: ACTIVE | Noted: 2025-01-08

## 2025-01-08 PROBLEM — E87.29 HIGH ANION GAP METABOLIC ACIDOSIS: Status: ACTIVE | Noted: 2025-01-08

## 2025-01-08 LAB
2HR DELTA HS TROPONIN: 0 NG/L
ANION GAP SERPL CALCULATED.3IONS-SCNC: 12 MMOL/L (ref 4–13)
BASOPHILS # BLD AUTO: 0.06 THOUSANDS/ΜL (ref 0–0.1)
BASOPHILS NFR BLD AUTO: 1 % (ref 0–1)
BUN SERPL-MCNC: 19 MG/DL (ref 5–25)
CALCIUM SERPL-MCNC: 8.4 MG/DL (ref 8.4–10.2)
CARDIAC TROPONIN I PNL SERPL HS: 4 NG/L (ref ?–50)
CARDIAC TROPONIN I PNL SERPL HS: 4 NG/L (ref ?–50)
CHLORIDE SERPL-SCNC: 102 MMOL/L (ref 96–108)
CO2 SERPL-SCNC: 22 MMOL/L (ref 21–32)
CREAT SERPL-MCNC: 1.15 MG/DL (ref 0.6–1.3)
CREAT UR-MCNC: 149.1 MG/DL
EOSINOPHIL # BLD AUTO: 0.03 THOUSAND/ΜL (ref 0–0.61)
EOSINOPHIL NFR BLD AUTO: 0 % (ref 0–6)
ERYTHROCYTE [DISTWIDTH] IN BLOOD BY AUTOMATED COUNT: 17.3 % (ref 11.6–15.1)
GFR SERPL CREATININE-BSD FRML MDRD: 59 ML/MIN/1.73SQ M
GLUCOSE SERPL-MCNC: 91 MG/DL (ref 65–140)
HCT VFR BLD AUTO: 39.5 % (ref 34.8–46.1)
HGB BLD-MCNC: 12.4 G/DL (ref 11.5–15.4)
IMM GRANULOCYTES # BLD AUTO: 0.03 THOUSAND/UL (ref 0–0.2)
IMM GRANULOCYTES NFR BLD AUTO: 0 % (ref 0–2)
LYMPHOCYTES # BLD AUTO: 1.81 THOUSANDS/ΜL (ref 0.6–4.47)
LYMPHOCYTES NFR BLD AUTO: 23 % (ref 14–44)
MAGNESIUM SERPL-MCNC: 2.4 MG/DL (ref 1.9–2.7)
MCH RBC QN AUTO: 25.2 PG (ref 26.8–34.3)
MCHC RBC AUTO-ENTMCNC: 31.4 G/DL (ref 31.4–37.4)
MCV RBC AUTO: 80 FL (ref 82–98)
MONOCYTES # BLD AUTO: 0.38 THOUSAND/ΜL (ref 0.17–1.22)
MONOCYTES NFR BLD AUTO: 5 % (ref 4–12)
NEUTROPHILS # BLD AUTO: 5.49 THOUSANDS/ΜL (ref 1.85–7.62)
NEUTS SEG NFR BLD AUTO: 71 % (ref 43–75)
NRBC BLD AUTO-RTO: 0 /100 WBCS
PLATELET # BLD AUTO: 407 THOUSANDS/UL (ref 149–390)
PMV BLD AUTO: 9.5 FL (ref 8.9–12.7)
POTASSIUM SERPL-SCNC: 2.3 MMOL/L (ref 3.5–5.3)
POTASSIUM UR-SCNC: 7.8 MMOL/L
RBC # BLD AUTO: 4.92 MILLION/UL (ref 3.81–5.12)
SODIUM SERPL-SCNC: 136 MMOL/L (ref 135–147)
WBC # BLD AUTO: 7.8 THOUSAND/UL (ref 4.31–10.16)

## 2025-01-08 PROCEDURE — 84484 ASSAY OF TROPONIN QUANT: CPT

## 2025-01-08 PROCEDURE — 84133 ASSAY OF URINE POTASSIUM: CPT

## 2025-01-08 PROCEDURE — 99223 1ST HOSP IP/OBS HIGH 75: CPT | Performed by: INTERNAL MEDICINE

## 2025-01-08 PROCEDURE — 85025 COMPLETE CBC W/AUTO DIFF WBC: CPT

## 2025-01-08 PROCEDURE — 83735 ASSAY OF MAGNESIUM: CPT

## 2025-01-08 PROCEDURE — 80048 BASIC METABOLIC PNL TOTAL CA: CPT

## 2025-01-08 PROCEDURE — 82570 ASSAY OF URINE CREATININE: CPT

## 2025-01-08 RX ORDER — GABAPENTIN 100 MG/1
100 CAPSULE ORAL 3 TIMES DAILY
Status: DISCONTINUED | OUTPATIENT
Start: 2025-01-08 | End: 2025-01-11 | Stop reason: HOSPADM

## 2025-01-08 RX ORDER — ACETAMINOPHEN 325 MG/1
650 TABLET ORAL EVERY 6 HOURS PRN
Status: DISCONTINUED | OUTPATIENT
Start: 2025-01-08 | End: 2025-01-11 | Stop reason: HOSPADM

## 2025-01-08 RX ORDER — ENOXAPARIN SODIUM 100 MG/ML
40 INJECTION SUBCUTANEOUS EVERY 24 HOURS
Status: DISCONTINUED | OUTPATIENT
Start: 2025-01-08 | End: 2025-01-11 | Stop reason: HOSPADM

## 2025-01-08 RX ORDER — POTASSIUM CHLORIDE 14.9 MG/ML
20 INJECTION INTRAVENOUS
Status: COMPLETED | OUTPATIENT
Start: 2025-01-08 | End: 2025-01-09

## 2025-01-08 RX ORDER — ESCITALOPRAM OXALATE 10 MG/1
20 TABLET ORAL
Status: DISCONTINUED | OUTPATIENT
Start: 2025-01-08 | End: 2025-01-11 | Stop reason: HOSPADM

## 2025-01-08 RX ORDER — POTASSIUM CHLORIDE 14.9 MG/ML
20 INJECTION INTRAVENOUS
Status: DISCONTINUED | OUTPATIENT
Start: 2025-01-08 | End: 2025-01-08

## 2025-01-08 RX ORDER — SPIRONOLACTONE 25 MG/1
12.5 TABLET ORAL DAILY
Status: DISCONTINUED | OUTPATIENT
Start: 2025-01-08 | End: 2025-01-11 | Stop reason: HOSPADM

## 2025-01-08 RX ORDER — SUMATRIPTAN 6 MG/.5ML
6 INJECTION, SOLUTION SUBCUTANEOUS ONCE AS NEEDED
Status: DISCONTINUED | OUTPATIENT
Start: 2025-01-08 | End: 2025-01-11 | Stop reason: HOSPADM

## 2025-01-08 RX ORDER — ENOXAPARIN SODIUM 100 MG/ML
40 INJECTION SUBCUTANEOUS DAILY
Status: DISCONTINUED | OUTPATIENT
Start: 2025-01-09 | End: 2025-01-08

## 2025-01-08 RX ADMIN — POTASSIUM CHLORIDE 40 MEQ: 2 INJECTION, SOLUTION, CONCENTRATE INTRAVENOUS at 21:15

## 2025-01-08 RX ADMIN — GABAPENTIN 100 MG: 100 CAPSULE ORAL at 16:09

## 2025-01-08 RX ADMIN — AMITRIPTYLINE HYDROCHLORIDE 75 MG: 25 TABLET, FILM COATED ORAL at 21:16

## 2025-01-08 RX ADMIN — POTASSIUM CHLORIDE 20 MEQ: 14.9 INJECTION, SOLUTION INTRAVENOUS at 16:09

## 2025-01-08 RX ADMIN — SPIRONOLACTONE 12.5 MG: 25 TABLET ORAL at 16:09

## 2025-01-08 RX ADMIN — ESCITALOPRAM OXALATE 20 MG: 10 TABLET ORAL at 21:16

## 2025-01-08 RX ADMIN — GABAPENTIN 100 MG: 100 CAPSULE ORAL at 21:16

## 2025-01-08 RX ADMIN — POTASSIUM CHLORIDE 20 MEQ: 14.9 INJECTION, SOLUTION INTRAVENOUS at 18:27

## 2025-01-08 NOTE — ASSESSMENT & PLAN NOTE
Follow with dietitian and nephrology for nutrient and electrolyte management.   Continue to monitor for any deficiencies.

## 2025-01-08 NOTE — ASSESSMENT & PLAN NOTE
AG-16 on outpatient labs   Repeat BMP today is still pending   Possibly starvation ketosis as reports little oral intake over past 3 days   If still elevated on repeat, check lactic acid & beta-hydroxybutyrate

## 2025-01-08 NOTE — ASSESSMENT & PLAN NOTE
Lab Results   Component Value Date    EGFR 60 01/07/2025    EGFR 66 01/02/2025    EGFR 57 12/23/2024    CREATININE 1.13 01/07/2025    CREATININE 1.05 01/02/2025    CREATININE 1.18 12/23/2024     At baseline

## 2025-01-08 NOTE — ASSESSMENT & PLAN NOTE
40 yo female PMHx gastric bypass, severe hypokalemia maintained on daily 80mEq IV potassium infusions presents with symptomatic hypokalemia with right hand & facial paresthesias, chest pressure, nausea & poor appetite.    Continue spironolactone   K 2.8 on outpatient labs, repeat now. Also check mag   Check hs trop & ECG w/ c/o chest heaviness   Telemetry   Nephrology consult  Suspected extrarenal potassium wasting w/ workup completed prior hospitalization. Also w/ poor absorption with prior gastric bypass  Recs IV potassium chloride 40mEq every 6 hours   Serial BMP

## 2025-01-08 NOTE — CONSULTS
Administrative Statements   VIRTUAL CARE DOCUMENTATION:     1. This service was provided via Telemedicine using [employer]     2. Parties in the room with patient during teleconsult RN    3. Confidentiality My office door was closed     4. Participants No one else was in the room    5. Patient acknowledged consent and understanding of privacy and security of the  Telemedicine consult. I informed the patient that I have reviewed their record in Epic and presented the opportunity for them to ask any questions regarding the visit today.  The patient agreed to participate.    6. I have spent a total time of 45 minutes in caring for this patient on the day of the visit/encounter including Diagnostic results, not including the time spent for establishing the audio/video connection.        Consultation - Nephrology   Brittani Patino 39 y.o. female MRN: [id number]  Unit/Bed#: 407-01 Encounter: [id number]    ASSESSMENT/PLAN:    Hypokalemia  This was seen on outpatient labs yesterday with a potassium of 2.8 mmol/L.  Current labs are pending.  She gets 10 millimoles potassium infused every hour over 8 hours as she is sleeping.  Last had this completed last evening.  She presented today with numbness and tingling in her hands and face along with chest pressure.  She also endorses nausea along with poor appetite for the past 3 days.  Has been previously hospitalized for similar complaints.  She is on spironolactone 12.5 mg daily.  She has previously had a normal Dimas renin ratio.  Most likely etiology is extrarenal potassium wasting with workup completed during past hospitalizations.  It is thought that she has poor absorption of potassium due to gastric bypass.  Will recheck urine potassium and creatinine.  For repletion recommend IV potassium chloride 40 mEq every 6 hours.     CKD stage IIIa  -Baseline creatinine 1 to 1.1 mg/dL, most recent creatinine 1.13 mg/dL  -Follows with Dr. Ricketts as OP    Vitamin D  "deficiency  Continue with ergocalciferol 50,000 units twice weekly    Anemia  -Hgb 12.4  -Low MCV, history of iron deficiency anemia  -Continue with oral iron supplementation  -Recommend transfuse for goal greater than 7 per primary team    History of nephrolithiasis  Bilateral nonobstructing renal calculi noted on CT scan from 2024    HISTORY OF PRESENT ILLNESS:  Requesting Physician: Papo Carr DO  Reason for Consult: hypokalemia    Brittani Patino is a 39 y.o. year old female history of nephrolithiasis, CKD 3A, gastric bypass, resistant hypokalemia who was directly admitted to [facility] after presenting with symptoms of hypokalemia.  Her potassium level was 2.8 on outpatient labs yesterday.  She states that she had low-grade fever over the weekend.  She admits to nausea but no vomiting or diarrhea.  She has had a poor appetite for the past 3 days.  As an outpatient she gets potassium chloride infused overnight for 8 hours.  A renal consultation is requested today for assistance in the management of hypokalemia.    PAST MEDICAL HISTORY:  Past Medical History:   Diagnosis Date    C. difficile colitis 2016    COVID-19     2022- pt denies long covid    DUB (dysfunctional uterine bleeding)     H. pylori infection     Hypertension     Hypokalemia     Kidney stone 2023    Left lower quadrant abdominal pain 2020    Migraine     WAGNER (obstructive sleep apnea) 2014    Pancreatitis     Psychiatric disorder     Anxiety    Rectal bleeding     Renal disorder     Rhabdomyolysis     Sleep apnea     resolved with bariatric surgery    Thrombosed external hemorrhoid        PAST SURGICAL HISTORY:  Past Surgical History:   Procedure Laterality Date    ABDOMINAL SURGERY      APPENDECTOMY       SECTION      CHOLECYSTECTOMY      COSMETIC SURGERY      \"tummy tuck\"    FL GUIDED CENTRAL VENOUS ACCESS DEVICE INSERTION  2018    FL GUIDED CENTRAL VENOUS ACCESS DEVICE INSERTION  " 2/9/2024    FL RETROGRADE PYELOGRAM  1/3/2023    GASTRIC BYPASS      HYSTERECTOMY      LAPAROSCOPY      LA ANRCT XM SURG REQ ANES GENERAL SPI/EDRL DX N/A 9/23/2021    Procedure: ANAL EXAM UNDER ANESTHESIA; HEMORRHOIDECTOMY;  Surgeon: Dona Jeffries DO;  [facility];  Service: General    LA CYSTO/URETERO W/LITHOTRIPSY &INDWELL STENT INSRT Left 1/3/2023    Procedure: CYSTOSCOPY URETEROSCOPY WITH RETROGRADE PYELOGRAM, BASKET STONE EXTRACTION AND INSERTION STENT URETERAL;  Surgeon: Geovanny Rosales MD;  [facility];  Service: Urology    LA EXC THROMBOSED HEMORRHOID XTRNL N/A 7/8/2022    Procedure: EXCISION OF THROMBOSED EXTERNAL HEMORRHOID, Excision of perianal skin tag, dranage of perianal abscess, anal fistulatomy;  Surgeon: Dona Jeffries DO;  [facility];  Service: General    LA INSJ TUNNELED CTR VAD W/SUBQ PORT AGE 5 YR/> Left 2/9/2024    Procedure: INSERTION VENOUS PORT (PORT-A-CATH);  Surgeon: Ryan Singh DO;  [facility]: [facility];  Service: General    LA RMVL AMIRAH CTR VAD W/SUBQ PORT/ CTR/PRPH INSJ Right 2/9/2024    Procedure: REMOVAL VENOUS PORT (PORT-A-CATH);  Surgeon: Ryan Singh DO;  [facility];  Service: General    TUNNELED VENOUS PORT PLACEMENT N/A 12/21/2018    Procedure: INSERTION VENOUS PORT (PORT-A-CATH);  Surgeon: Ryan Singh DO;  [facility];  Service: General       ALLERGIES:  Allergies   Allergen Reactions    Other Anaphylaxis     Jelly fish    Nsaids Other (See Comments)     Other reaction(s): Other (Please comment)  Should not take s/p bariatric surgery  Other reaction(s): Other (See Comments)  Should not take as per prior records  Should not take s/p bariatric surgery  Has hx of gastric bypass      Prednisone      Nausea, vomiting, diarrhea       SOCIAL HISTORY:  Social History     Substance and Sexual Activity   Alcohol Use Not Currently     Social History     Substance and Sexual Activity   Drug Use Not Currently     Types: Marijuana    Comment: Medical marijuana     Social History     Tobacco Use   Smoking Status Never    Passive exposure: Never   Smokeless Tobacco Never       FAMILY HISTORY:  Family History   Problem Relation Age of Onset    No Known Problems Mother     Hypertension Father     Diabetes Father     Urolithiasis Father     Prostate cancer Father     Diabetes Maternal Grandfather        MEDICATIONS:    Current Facility-Administered Medications:     acetaminophen (TYLENOL) tablet 650 mg, 650 mg, Oral, Q6H PRN, Tere Fregoso PA-C    amitriptyline (ELAVIL) tablet 75 mg, 75 mg, Oral, HS, Tere Fregoso PA-C    [START ON 1/9/2025] enoxaparin (LOVENOX) subcutaneous injection 40 mg, 40 mg, Subcutaneous, Daily, Tere Fregoso PA-C    escitalopram (LEXAPRO) tablet 20 mg, 20 mg, Oral, HS, Tere Fregoso PA-C    gabapentin (NEURONTIN) capsule 100 mg, 100 mg, Oral, TID, Tere Fregoso PA-C    potassium chloride 20 mEq IVPB (premix), 20 mEq, Intravenous, Q1H, Tere Fregoso PA-C    potassium chloride 20 mEq IVPB (premix), 20 mEq, Intravenous, Q1H, Tere Fregoso PA-C    spironolactone (ALDACTONE) tablet 12.5 mg, 12.5 mg, Oral, Daily, Tere Fregoso PA-C    SUMAtriptan (IMITREX) subcutaneous injection 6 mg, 6 mg, Subcutaneous, Once PRN, Tere Fregoso PA-C    REVIEW OF SYSTEMS:  Review of Systems   Constitutional:  Positive for appetite change and fever. Negative for chills.   HENT:  Negative for rhinorrhea and sore throat.    Respiratory:  Negative for cough and shortness of breath.    Cardiovascular:  Negative for chest pain and leg swelling.   Gastrointestinal:  Positive for nausea. Negative for diarrhea and vomiting.   Genitourinary:  Negative for dysuria and hematuria.   Neurological:  Negative for dizziness and headaches.      A complete 10 point review of systems was performed and found to be negative unless otherwise  "noted above or in the HPI.    PHYSICAL EXAM:  Current Weight: Weight - Scale: 55.7 kg (122 lb 12.8 oz)  First Weight: Weight - Scale: 55.7 kg (122 lb 12.8 oz)  Vitals:    01/08/25 1433 01/08/25 1438 01/08/25 1438   BP: 117/74     Pulse:  62 90   Resp:  18 16   Temp: 98.5 °F (36.9 °C)  98.5 °F (36.9 °C)   TempSrc:   Oral   SpO2:  92% 97%   Weight:   55.7 kg (122 lb 12.8 oz)   Height:   5' 2\" (1.575 m)     No intake or output data in the 24 hours ending 01/08/25 1605  Physical Exam  Vitals and nursing note reviewed.   Constitutional:       General: She is not in acute distress.     Appearance: She is well-developed. She is not ill-appearing.   HENT:      Head: Normocephalic and atraumatic.   Eyes:      Conjunctiva/sclera: Conjunctivae normal.   Cardiovascular:      Rate and Rhythm: Normal rate and regular rhythm.      Heart sounds: No murmur heard.  Pulmonary:      Effort: Pulmonary effort is normal. No respiratory distress.   Abdominal:      Palpations: Abdomen is soft.      Tenderness: There is no abdominal tenderness.   Musculoskeletal:         General: No swelling.   Skin:     General: Skin is warm and dry.   Neurological:      Mental Status: She is alert and oriented to person, place, and time.   Psychiatric:         Mood and Affect: Mood normal.         Behavior: Behavior normal.          Invasive Devices:      Lab Results:   Results from last 7 days   Lab Units 01/08/25  1548 01/07/25  0712 01/02/25  0708   WBC Thousand/uL 7.80  --   --    HEMOGLOBIN g/dL 12.4  --   --    HEMATOCRIT % 39.5  --   --    PLATELETS Thousands/uL 407*  --   --    POTASSIUM mmol/L  --  2.8* 3.5   CHLORIDE mmol/L  --  98 106   CO2 mmol/L  --  21 21   BUN mg/dL  --  22 14   CREATININE mg/dL  --  1.13 1.05   CALCIUM mg/dL  --  9.2 8.5       I have personally reviewed the blood work as stated above and in my note.  I have personally reviewed IM note.   "

## 2025-01-08 NOTE — H&P
H&P - Hospitalist   Name: Brittani Patino 39 y.o. female I MRN: [id number]  Unit/Bed#: 407-01 I Date of Admission: 1/8/2025   Date of Service: 1/8/2025 I Hospital Day: 0     Assessment & Plan  Hypokalemia  40 yo female PMHx gastric bypass, severe hypokalemia maintained on daily 80mEq IV potassium infusions presents with symptomatic hypokalemia with right hand & facial paresthesias, chest pressure, nausea & poor appetite.    Continue spironolactone   K 2.8 on outpatient labs, repeat now. Also check mag   Check hs trop & ECG w/ c/o chest heaviness   Telemetry   Nephrology consult  Suspected extrarenal potassium wasting w/ workup completed prior hospitalization. Also w/ poor absorption with prior gastric bypass  Recs IV potassium chloride 40mEq every 6 hours   Serial BMP     High anion gap metabolic acidosis  AG-16 on outpatient labs   Repeat BMP today is still pending   Possibly starvation ketosis as reports little oral intake over past 3 days   If still elevated on repeat, check lactic acid & beta-hydroxybutyrate   History of gastric bypass  Follow with dietitian and nephrology for nutrient and electrolyte management.   Continue to monitor for any deficiencies.     Chronic migraine without aura without status migrainosus, not intractable  Currently controlled on Amitriptyline and Sumatriptan as needed.   Continue to monitor   Vitamin B12 deficiency  Maintained on B12 injections  Vitamin D insufficiency  Continue ergocalciferol 50,000 units twice likely   Stage 3a chronic kidney disease (CKD) ([facility])  Lab Results   Component Value Date    EGFR 60 01/07/2025    EGFR 66 01/02/2025    EGFR 57 12/23/2024    CREATININE 1.13 01/07/2025    CREATININE 1.05 01/02/2025    CREATININE 1.18 12/23/2024     At baseline      VTE Pharmacologic Prophylaxis:   lovenox  Code Status: Level 1 - Full Code   Discussion with family: Patient declined call to .     Anticipated Length of Stay: Patient will be admitted on an inpatient  "basis with an anticipated length of stay of greater than 2 midnights secondary to severe symptomatic hypokalemia.    History of Present Illness   Chief Complaint: \"Facial numbness and right arm tingling\" x 1 day     Brittani Patino is a 39 y.o. female with a PMH of Roshan en Y gastric bypass, refractory hypokalemia, migraines, CKD, and chronic anemia who presents with symptomatic hypokalemia.  She reports onset of right hand paresthesias approximately 2 days ago.  With development of facial paresthesias and chest heaviness this morning. No associated shortness of breath.  Reports associated nausea and decreased appetite.   Notes that the symptoms are typical for her prior episodes of hypokalemia. No vomiting however does report dry heaves.  Denies diarrhea. States she has been able to eat or drink much over the past few days.  She reports low-grade fever at home with past Friday, otherwise no further fevers, chills, HA, cough, SOB, abdominal pain, urinary complaints or leg swelling.    Patient has history of severe refractory hypokalemia of unclear etiology.  Has had extensive workup by her outpatient nephrologist and additional extensive workup by both Torito and Pancho.  She is maintained on daily IV potassium infusions of 10millimoles over 8 hours as she is sleeping. Reports she has been compliant with these. Patient called her nephrologist office given her symptoms and outpatient labs with a potassium of 2.8.  They recommended direct admission to the hospital as such patient presented for further evaluation treatment.      Review of Systems   Constitutional:  Positive for fever. Negative for chills.   Respiratory:  Negative for cough and shortness of breath.    Cardiovascular:  Positive for chest pain. Negative for palpitations and leg swelling.   Gastrointestinal:  Positive for nausea. Negative for abdominal pain, diarrhea and vomiting.   Genitourinary:  Negative for difficulty urinating.       Historical " "Information   Past Medical History:   Diagnosis Date    C. difficile colitis 2016    COVID-19     2022- pt denies long covid    DUB (dysfunctional uterine bleeding)     H. pylori infection     Hypertension     Hypokalemia     Kidney stone 2023    Left lower quadrant abdominal pain 2020    Migraine     WAGNER (obstructive sleep apnea) 2014    Pancreatitis     Psychiatric disorder     Anxiety    Rectal bleeding     Renal disorder     Rhabdomyolysis     Sleep apnea     resolved with bariatric surgery    Thrombosed external hemorrhoid      Past Surgical History:   Procedure Laterality Date    ABDOMINAL SURGERY      APPENDECTOMY       SECTION      CHOLECYSTECTOMY      COSMETIC SURGERY      \"tummy tuck\"    FL GUIDED CENTRAL VENOUS ACCESS DEVICE INSERTION  2018    FL GUIDED CENTRAL VENOUS ACCESS DEVICE INSERTION  2024    FL RETROGRADE PYELOGRAM  1/3/2023    GASTRIC BYPASS      HYSTERECTOMY      LAPAROSCOPY      MO ANRCT XM SURG REQ ANES GENERAL SPI/EDRL DX N/A 2021    Procedure: ANAL EXAM UNDER ANESTHESIA; HEMORRHOIDECTOMY;  Surgeon: Dona Jeffries DO;  [facility];  Service: General    MO CYSTO/URETERO W/LITHOTRIPSY &INDWELL STENT INSRT Left 1/3/2023    Procedure: CYSTOSCOPY URETEROSCOPY WITH RETROGRADE PYELOGRAM, BASKET STONE EXTRACTION AND INSERTION STENT URETERAL;  Surgeon: Geovanny Rosales MD;  [facility];  Service: Urology    MO EXC THROMBOSED HEMORRHOID XTRNL N/A 2022    Procedure: EXCISION OF THROMBOSED EXTERNAL HEMORRHOID, Excision of perianal skin tag, dranage of perianal abscess, anal fistulatomy;  Surgeon: Dona Jeffries DO;  [facility];  Service: General    MO INSJ TUNNELED CTR VAD W/SUBQ PORT AGE 5 YR/> Left 2024    Procedure: INSERTION VENOUS PORT (PORT-A-CATH);  Surgeon: Ryan Singh DO;  [facility];  Service: General    MO RMVL AMIRAH CTR VAD W/SUBQ PORT/ CTR/PRPH INSJ Right 2024    " Procedure: REMOVAL VENOUS PORT (PORT-A-CATH);  Surgeon: Ryan Singh DO;  [facility];  Service: General    TUNNELED VENOUS PORT PLACEMENT N/A 12/21/2018    Procedure: INSERTION VENOUS PORT (PORT-A-CATH);  Surgeon: Ryan Singh DO;  [facility];  Service: General     Social History     Tobacco Use    Smoking status: Never     Passive exposure: Never    Smokeless tobacco: Never   Vaping Use    Vaping status: Never Used   Substance and Sexual Activity    Alcohol use: Not Currently    Drug use: Not Currently     Types: Marijuana     Comment: Medical marijuana    Sexual activity: Yes     Partners: Male     Birth control/protection: Surgical     E-Cigarette/Vaping    E-Cigarette Use Never User      E-Cigarette/Vaping Substances    Nicotine No     THC No     CBD No     Flavoring No     Other No     Unknown No        Social History:  Marital Status: /Civil Union   Occupation:   Patient Pre-hospital Living Situation:   Patient Pre-hospital Level of Mobility:   Patient Pre-hospital Diet Restrictions:     Meds/Allergies   I have reviewed home medications using recent Epic encounter.  Prior to Admission medications    Medication Sig Start Date End Date Taking? Authorizing Provider   amitriptyline (ELAVIL) 50 mg tablet Take 1.5 tablets (75 mg total) by mouth daily at bedtime 8/12/24   ARIADNA Gtz   celecoxib (CeleBREX) 200 mg capsule TAKE 1 CAPSULE (200 MG TOTAL) BY MOUTH DAILY FOR 5 DAYS. G  Patient not taking: Reported on 11/5/2024 4/2/24 4/2/25  Historical Provider, MD   cyanocobalamin 1,000 mcg/mL Inject 1 mL (1,000 mcg total) into a muscle every 30 (thirty) days Next dose due 5/1/2023 4/18/23   Pallavi Argueta MD   EPINEPHrine (EPIPEN) 0.3 mg/0.3 mL SOAJ Inject 0.3 mg into a muscle 7/5/24   Historical Provider, MD   ergocalciferol (ERGOCALCIFEROL) 1.25 MG (54681 UT) capsule Take 1 capsule (50,000 Units total) by mouth 2 (two) times a week Mondays and Thursdays 2/26/24   Juan Carlos  "DO Jamarcus   escitalopram (LEXAPRO) 20 mg tablet 20 mg daily at bedtime 3/14/23   Historical Provider, MD   gabapentin (NEURONTIN) 100 mg capsule Take 100 mg by mouth Three times a day 11/18/24 11/18/25  Historical Provider, MD   Galcanezumab-gnlm 120 MG/ML SOAJ Inject 120 mg under the skin every 30 (thirty) days 12/13/24   ARIADNA Gtz   meclizine (ANTIVERT) 12.5 MG tablet Take 1 tablet (12.5 mg total) by mouth 3 (three) times a day as needed for dizziness Do not take daily 1/7/25   ARIADNA Gtz   ondansetron (ZOFRAN-ODT) 4 mg disintegrating tablet Take 4 mg by mouth every 6 (six) hours as needed for nausea or vomiting 6/12/23   Historical Provider, MD   polyethylene glycol (MIRALAX) 17 g packet Take 17 g by mouth daily for 14 days 11/7/24 11/21/24  Giovani Pedraza MD   potassium chloride 20 MEQ/50ML Inject 200 mL (80 mEq total) into a catheter in a vein daily 80 meq in 1 litre bag to be infused daily over 8 hours. Continue as ordered prior to admission 8/26/24   Juan Carlos Ricketts DO   spironolactone (ALDACTONE) 25 mg tablet Take 0.5 tablets (12.5 mg total) by mouth daily 8/25/24   Kenia Parry PA-C   SUMAtriptan Succinate 6 MG/0.5ML SOAJ Inject 0.5 mL (6 mg total) under the skin as needed (migraine) May repeat once in 1 hour if needed. Max 12 mg/day, Max 3 days per week 1/7/25   ARIADNA Gtz   Syringe/Needle, Disp, (SYRINGE 3CC/25GX5/8\") 25G X 5/8\" 3 ML MISC Use once monthly for B12 injection. 6/1/20   Juan Carlos Ricketts DO   vitamin A 3 MG (85016 UT) capsule Take 10,000 Units by mouth 2 (two) times a day    Historical Provider, MD   vitamin k 100 MCG tablet Take 1 tablet by mouth daily 6/20/23   Historical Provider, MD     Allergies   Allergen Reactions    Other Anaphylaxis     Jelly fish    Nsaids Other (See Comments)     Other reaction(s): Other (Please comment)  Should not take s/p bariatric surgery  Other reaction(s): Other (See Comments)  Should not " take as per prior records  Should not take s/p bariatric surgery  Has hx of gastric bypass      Prednisone      Nausea, vomiting, diarrhea       Objective :  Temp:  [98.5 °F (36.9 °C)] 98.5 °F (36.9 °C)  HR:  [62-90] 90  BP: (117)/(74) 117/74  Resp:  [16-18] 16  SpO2:  [92 %-97 %] 97 %  O2 Device: None (Room air)    Physical Exam  Constitutional:       General: She is not in acute distress.  HENT:      Head: Normocephalic and atraumatic.   Cardiovascular:      Rate and Rhythm: Normal rate and regular rhythm.      Comments: Port-a-cath  Pulmonary:      Effort: Pulmonary effort is normal. No respiratory distress.      Breath sounds: Normal breath sounds.   Abdominal:      General: Abdomen is flat. Bowel sounds are normal. There is no distension.      Palpations: Abdomen is soft.      Tenderness: There is no abdominal tenderness.   Musculoskeletal:      Right lower leg: No edema.      Left lower leg: No edema.   Skin:     General: Skin is warm and dry.   Neurological:      General: No focal deficit present.      Mental Status: She is alert and oriented to person, place, and time.          Lines/Drains:      Central Line:  Goal for removal: Port accessed. Will de-access as appropriate.             Lab Results: I have reviewed the following results:  Results from last 7 days   Lab Units 01/08/25  1548   WBC Thousand/uL 7.80   HEMOGLOBIN g/dL 12.4   HEMATOCRIT % 39.5   PLATELETS Thousands/uL 407*   SEGS PCT % 71   LYMPHO PCT % 23   MONO PCT % 5   EOS PCT % 0     Results from last 7 days   Lab Units 01/07/25  0712   SODIUM mmol/L 135   POTASSIUM mmol/L 2.8*   CHLORIDE mmol/L 98   CO2 mmol/L 21   BUN mg/dL 22   CREATININE mg/dL 1.13   ANION GAP mmol/L 16*   CALCIUM mg/dL 9.2   GLUCOSE RANDOM mg/dL 102             Lab Results   Component Value Date    HGBA1C 5.6 01/07/2025    HGBA1C 5.2 02/19/2021    HGBA1C 5.4 10/04/2018           Imaging Results Review: No pertinent imaging studies reviewed.  Other Study Results Review: No  additional pertinent studies reviewed.    Administrative Statements  MILTON Hernandez       ** Please Note: This note has been constructed using a voice recognition system. **

## 2025-01-09 ENCOUNTER — APPOINTMENT (INPATIENT)
Dept: NON INVASIVE DIAGNOSTICS | Facility: HOSPITAL | Age: 40
DRG: 641 | End: 2025-01-09
Payer: COMMERCIAL

## 2025-01-09 LAB
ALBUMIN SERPL BCG-MCNC: 4.2 G/DL (ref 3.5–5)
ALP SERPL-CCNC: 54 U/L (ref 34–104)
ALT SERPL W P-5'-P-CCNC: 13 U/L (ref 7–52)
ANION GAP SERPL CALCULATED.3IONS-SCNC: 7 MMOL/L (ref 4–13)
ANION GAP SERPL CALCULATED.3IONS-SCNC: 8 MMOL/L (ref 4–13)
ANION GAP SERPL CALCULATED.3IONS-SCNC: 9 MMOL/L (ref 4–13)
AORTIC ROOT: 2.5 CM
AST SERPL W P-5'-P-CCNC: 18 U/L (ref 13–39)
B-OH-BUTYR SERPL-MCNC: 0.2 MMOL/L (ref 0.02–0.27)
BASOPHILS # BLD AUTO: 0.05 THOUSANDS/ΜL (ref 0–0.1)
BASOPHILS NFR BLD AUTO: 1 % (ref 0–1)
BILIRUB DIRECT SERPL-MCNC: 0.07 MG/DL (ref 0–0.2)
BILIRUB SERPL-MCNC: 0.42 MG/DL (ref 0.2–1)
BSA FOR ECHO PROCEDURE: 1.55 M2
BUN SERPL-MCNC: 13 MG/DL (ref 5–25)
BUN SERPL-MCNC: 14 MG/DL (ref 5–25)
BUN SERPL-MCNC: 18 MG/DL (ref 5–25)
CALCIUM SERPL-MCNC: 7.7 MG/DL (ref 8.4–10.2)
CALCIUM SERPL-MCNC: 7.9 MG/DL (ref 8.4–10.2)
CALCIUM SERPL-MCNC: 8 MG/DL (ref 8.4–10.2)
CARDIAC TROPONIN I PNL SERPL HS: <2 NG/L (ref 8–18)
CHLORIDE SERPL-SCNC: 106 MMOL/L (ref 96–108)
CHLORIDE SERPL-SCNC: 107 MMOL/L (ref 96–108)
CHLORIDE SERPL-SCNC: 108 MMOL/L (ref 96–108)
CO2 SERPL-SCNC: 19 MMOL/L (ref 21–32)
CO2 SERPL-SCNC: 21 MMOL/L (ref 21–32)
CO2 SERPL-SCNC: 22 MMOL/L (ref 21–32)
CREAT SERPL-MCNC: 1.03 MG/DL (ref 0.6–1.3)
CREAT SERPL-MCNC: 1.12 MG/DL (ref 0.6–1.3)
CREAT SERPL-MCNC: 1.15 MG/DL (ref 0.6–1.3)
E WAVE DECELERATION TIME: 189 MS
E/A RATIO: 0.77
EOSINOPHIL # BLD AUTO: 0.06 THOUSAND/ΜL (ref 0–0.61)
EOSINOPHIL NFR BLD AUTO: 1 % (ref 0–6)
ERYTHROCYTE [DISTWIDTH] IN BLOOD BY AUTOMATED COUNT: 17.9 % (ref 11.6–15.1)
FERRITIN SERPL-MCNC: 9 NG/ML (ref 11–307)
FRACTIONAL SHORTENING: 24 (ref 28–44)
GFR SERPL CREATININE-BSD FRML MDRD: 59 ML/MIN/1.73SQ M
GFR SERPL CREATININE-BSD FRML MDRD: 61 ML/MIN/1.73SQ M
GFR SERPL CREATININE-BSD FRML MDRD: 68 ML/MIN/1.73SQ M
GLUCOSE SERPL-MCNC: 82 MG/DL (ref 65–140)
GLUCOSE SERPL-MCNC: 88 MG/DL (ref 65–140)
GLUCOSE SERPL-MCNC: 94 MG/DL (ref 65–140)
HCT VFR BLD AUTO: 40.9 % (ref 34.8–46.1)
HGB BLD-MCNC: 12.3 G/DL (ref 11.5–15.4)
IMM GRANULOCYTES # BLD AUTO: 0.04 THOUSAND/UL (ref 0–0.2)
IMM GRANULOCYTES NFR BLD AUTO: 0 % (ref 0–2)
INTERVENTRICULAR SEPTUM IN DIASTOLE (PARASTERNAL SHORT AXIS VIEW): 1 CM
INTERVENTRICULAR SEPTUM: 1 CM (ref 0.6–1.1)
IRON SATN MFR SERPL: 18 % (ref 15–50)
IRON SERPL-MCNC: 68 UG/DL (ref 50–212)
LAAS-AP2: 12.9 CM2
LAAS-AP4: 12.5 CM2
LEFT ATRIUM SIZE: 2.6 CM
LEFT ATRIUM VOLUME (MOD BIPLANE): 35 ML
LEFT ATRIUM VOLUME INDEX (MOD BIPLANE): 22.6 ML/M2
LEFT INTERNAL DIMENSION IN SYSTOLE: 2.8 CM (ref 2.1–4)
LEFT VENTRICULAR INTERNAL DIMENSION IN DIASTOLE: 3.7 CM (ref 3.5–6)
LEFT VENTRICULAR POSTERIOR WALL IN END DIASTOLE: 0.8 CM
LEFT VENTRICULAR STROKE VOLUME: 27 ML
LV EF US.2D.A4C+ESTIMATED: 50 %
LVSV (TEICH): 27 ML
LYMPHOCYTES # BLD AUTO: 1.6 THOUSANDS/ΜL (ref 0.6–4.47)
LYMPHOCYTES NFR BLD AUTO: 16 % (ref 14–44)
MAGNESIUM SERPL-MCNC: 2.4 MG/DL (ref 1.9–2.7)
MCH RBC QN AUTO: 24.8 PG (ref 26.8–34.3)
MCHC RBC AUTO-ENTMCNC: 30.1 G/DL (ref 31.4–37.4)
MCV RBC AUTO: 83 FL (ref 82–98)
MONOCYTES # BLD AUTO: 0.63 THOUSAND/ΜL (ref 0.17–1.22)
MONOCYTES NFR BLD AUTO: 6 % (ref 4–12)
MV E'TISSUE VEL-LAT: 13 CM/S
MV E'TISSUE VEL-SEP: 9 CM/S
MV PEAK A VEL: 0.7 M/S
MV PEAK E VEL: 54 CM/S
MV STENOSIS PRESSURE HALF TIME: 55 MS
MV VALVE AREA P 1/2 METHOD: 4
NEUTROPHILS # BLD AUTO: 7.67 THOUSANDS/ΜL (ref 1.85–7.62)
NEUTS SEG NFR BLD AUTO: 76 % (ref 43–75)
NRBC BLD AUTO-RTO: 0 /100 WBCS
PHOSPHATE SERPL-MCNC: 3.1 MG/DL (ref 2.7–4.5)
PLATELET # BLD AUTO: 402 THOUSANDS/UL (ref 149–390)
PMV BLD AUTO: 9.5 FL (ref 8.9–12.7)
POTASSIUM SERPL-SCNC: 3.1 MMOL/L (ref 3.5–5.3)
POTASSIUM SERPL-SCNC: 3.2 MMOL/L (ref 3.5–5.3)
POTASSIUM SERPL-SCNC: 4 MMOL/L (ref 3.5–5.3)
PROCALCITONIN SERPL-MCNC: 0.1 NG/ML
PROT SERPL-MCNC: 7.6 G/DL (ref 6.4–8.4)
PULM VEIN S/D RATIO: 0.76
PV PEAK D VEL: 0.17 M/S
PV PEAK S VEL: 0.13 M/S
RA PRESSURE ESTIMATED: 5 MMHG
RBC # BLD AUTO: 4.96 MILLION/UL (ref 3.81–5.12)
RIGHT ATRIAL 2D VOLUME: 21 ML
RIGHT ATRIUM AREA SYSTOLE A4C: 9.8 CM2
RIGHT VENTRICLE ID DIMENSION: 2.6 CM
SL CV LEFT ATRIUM LENGTH A2C: 3.8 CM
SL CV LV EF: 65
SL CV PED ECHO LEFT VENTRICLE DIASTOLIC VOLUME (MOD BIPLANE) 2D: 57 ML
SL CV PED ECHO LEFT VENTRICLE SYSTOLIC VOLUME (MOD BIPLANE) 2D: 30 ML
SODIUM SERPL-SCNC: 134 MMOL/L (ref 135–147)
SODIUM SERPL-SCNC: 136 MMOL/L (ref 135–147)
SODIUM SERPL-SCNC: 137 MMOL/L (ref 135–147)
TIBC SERPL-MCNC: 386.4 UG/DL (ref 250–450)
TRANSFERRIN SERPL-MCNC: 276 MG/DL (ref 203–362)
TRICUSPID ANNULAR PLANE SYSTOLIC EXCURSION: 2 CM
TRICUSPID VALVE PEAK E WAVE VELOCITY: 0.12 M/S
UIBC SERPL-MCNC: 318 UG/DL (ref 155–355)
WBC # BLD AUTO: 10.05 THOUSAND/UL (ref 4.31–10.16)

## 2025-01-09 PROCEDURE — 82010 KETONE BODYS QUAN: CPT | Performed by: FAMILY MEDICINE

## 2025-01-09 PROCEDURE — 83550 IRON BINDING TEST: CPT

## 2025-01-09 PROCEDURE — 80076 HEPATIC FUNCTION PANEL: CPT | Performed by: INTERNAL MEDICINE

## 2025-01-09 PROCEDURE — 84145 PROCALCITONIN (PCT): CPT | Performed by: INTERNAL MEDICINE

## 2025-01-09 PROCEDURE — 80048 BASIC METABOLIC PNL TOTAL CA: CPT

## 2025-01-09 PROCEDURE — 99232 SBSQ HOSP IP/OBS MODERATE 35: CPT | Performed by: PHYSICIAN ASSISTANT

## 2025-01-09 PROCEDURE — 84100 ASSAY OF PHOSPHORUS: CPT | Performed by: INTERNAL MEDICINE

## 2025-01-09 PROCEDURE — 83540 ASSAY OF IRON: CPT

## 2025-01-09 PROCEDURE — 84484 ASSAY OF TROPONIN QUANT: CPT | Performed by: INTERNAL MEDICINE

## 2025-01-09 PROCEDURE — 93005 ELECTROCARDIOGRAM TRACING: CPT

## 2025-01-09 PROCEDURE — 80048 BASIC METABOLIC PNL TOTAL CA: CPT | Performed by: PHYSICIAN ASSISTANT

## 2025-01-09 PROCEDURE — 83735 ASSAY OF MAGNESIUM: CPT | Performed by: INTERNAL MEDICINE

## 2025-01-09 PROCEDURE — 99232 SBSQ HOSP IP/OBS MODERATE 35: CPT | Performed by: INTERNAL MEDICINE

## 2025-01-09 PROCEDURE — 82728 ASSAY OF FERRITIN: CPT

## 2025-01-09 PROCEDURE — 93306 TTE W/DOPPLER COMPLETE: CPT

## 2025-01-09 PROCEDURE — 85025 COMPLETE CBC W/AUTO DIFF WBC: CPT | Performed by: INTERNAL MEDICINE

## 2025-01-09 PROCEDURE — 93306 TTE W/DOPPLER COMPLETE: CPT | Performed by: INTERNAL MEDICINE

## 2025-01-09 RX ORDER — ONDANSETRON 2 MG/ML
4 INJECTION INTRAMUSCULAR; INTRAVENOUS EVERY 6 HOURS PRN
Status: DISCONTINUED | OUTPATIENT
Start: 2025-01-09 | End: 2025-01-11 | Stop reason: HOSPADM

## 2025-01-09 RX ADMIN — AMITRIPTYLINE HYDROCHLORIDE 75 MG: 25 TABLET, FILM COATED ORAL at 22:17

## 2025-01-09 RX ADMIN — ONDANSETRON 4 MG: 2 INJECTION INTRAMUSCULAR; INTRAVENOUS at 06:48

## 2025-01-09 RX ADMIN — PERFLUTREN 0.6 ML/MIN: 6.52 INJECTION, SUSPENSION INTRAVENOUS at 08:19

## 2025-01-09 RX ADMIN — ESCITALOPRAM OXALATE 20 MG: 10 TABLET ORAL at 22:17

## 2025-01-09 RX ADMIN — POTASSIUM CHLORIDE 40 MEQ: 2 INJECTION, SOLUTION, CONCENTRATE INTRAVENOUS at 03:53

## 2025-01-09 RX ADMIN — GABAPENTIN 100 MG: 100 CAPSULE ORAL at 08:32

## 2025-01-09 RX ADMIN — GABAPENTIN 100 MG: 100 CAPSULE ORAL at 16:42

## 2025-01-09 RX ADMIN — POTASSIUM CHLORIDE 40 MEQ: 2 INJECTION, SOLUTION, CONCENTRATE INTRAVENOUS at 20:21

## 2025-01-09 RX ADMIN — POTASSIUM CHLORIDE 40 MEQ: 2 INJECTION, SOLUTION, CONCENTRATE INTRAVENOUS at 14:12

## 2025-01-09 RX ADMIN — GABAPENTIN 100 MG: 100 CAPSULE ORAL at 22:17

## 2025-01-09 RX ADMIN — ONDANSETRON 4 MG: 2 INJECTION INTRAMUSCULAR; INTRAVENOUS at 16:42

## 2025-01-09 RX ADMIN — SPIRONOLACTONE 12.5 MG: 25 TABLET ORAL at 08:32

## 2025-01-09 RX ADMIN — POTASSIUM CHLORIDE 40 MEQ: 2 INJECTION, SOLUTION, CONCENTRATE INTRAVENOUS at 08:31

## 2025-01-09 NOTE — PROGRESS NOTES
Progress Note - Nephrology   Name: Brittani Patino 39 y.o. female I MRN: [id number]  Unit/Bed#: 407-01 I Date of Admission: 1/8/2025   Date of Service: 1/9/2025 I Hospital Day: 1     Assessment & Plan  Hypokalemia  We are following up on the patient's hypokalemia today, it is improving up to 3.2 mmol/L.,  As expected the patient's etiology is likely extrarenal losses of potassium.  She is currently on IV potassium chloride 40 mEq every 6 hours for a total of about 240 mEq of potassium per day.  We will continue this again at least for the next 24 hours while monitoring the BMP 3 times a day and seeing how the patient is doing tomorrow.  She states that her symptoms of numbness/tingling and chest pressure are getting better, which is a good sign as her potassium is increasing.  We are checking the EKG for any prominent U waves and other ST segment changes that can occur with hypokalemia, which do not seem apparent at this time.  A question was raised about the patient and a potential hypokalemic RTA, and there have been instances on prior blood work where the bicarbonate has been low which at this point I presume would indicate an acidemia.  She has also had a low sodium level so the question at this time whether she was having an episode of diarrhea that was leading to volume depletion and subsequent hyponatremia from ADH stimulation.   History of gastric bypass  At this time thought to be the cause of the hypokalemia  Nephrolithiasis  CT renal stone study abdomen pelvis reviewed -shows bilateral nonobstructing renal collecting system stones.  Patient aware to watch out for symptoms of renal colic  Vitamin D insufficiency  Continue with extra high-dose vitamin D supplementation with ergocalciferol.  Consider checking an updated vitamin D level to further guide therapy as it has been several months now without an updated vitamin D level  Vitamin B12 deficiency    Chronic migraine without aura without status migrainosus,  not intractable    High anion gap metabolic acidosis    Stage 3a chronic kidney disease (CKD) ([facility])  Lab Results   Component Value Date    EGFR 68 01/09/2025    EGFR 61 01/09/2025    EGFR 59 01/08/2025    CREATININE 1.03 01/09/2025    CREATININE 1.12 01/09/2025    CREATININE 1.15 01/08/2025   Patient has a baseline creatinine between 1 to 1.1 mg/dL.  Patient creatinine currently at baseline.  Follow-up with Dr. Ricketts as an outpatient      Subjective   Brief History of Admission -39-year-old female with history of nephrolithiasis, CKD 3A, gastric bypass, resistant hypokalemia presenting with hypokalemia and symptoms of such.  She is currently stating that her appetite is slightly improving and she is able to eat slightly more today.  She did note that over the past 7 days she had a 12 pound weight loss which was unintentional.  She overall just has not been feeling well the past few weeks which she attributes to winter weather sickness.  She denies chest pressure today and she also states that numbness and tingling are improving.  She states that she can feel when her potassium is usually low        Objective :  Temp:  [97.4 °F (36.3 °C)-99 °F (37.2 °C)] 98.5 °F (36.9 °C)  HR:  [62-90] 76  BP: (113-117)/(72-74) 113/72  Resp:  [15-20] 20  SpO2:  [92 %-98 %] 98 %  O2 Device: None (Room air)    Current Weight: Weight - Scale: 55.7 kg (122 lb 12.7 oz)  First Weight: Weight - Scale: 55.7 kg (122 lb 12.8 oz)  I/O       None          Physical Exam  Vitals and nursing note reviewed.   Constitutional:       General: She is not in acute distress.     Appearance: She is well-developed.   HENT:      Head: Normocephalic and atraumatic.   Cardiovascular:      Rate and Rhythm: Normal rate and regular rhythm.      Heart sounds: No murmur heard.  Pulmonary:      Effort: Pulmonary effort is normal. No respiratory distress.      Breath sounds: Normal breath sounds.   Abdominal:      Palpations: Abdomen is soft.      Tenderness:  There is no abdominal tenderness.   Musculoskeletal:         General: No swelling.   Skin:     General: Skin is warm and dry.      Capillary Refill: Capillary refill takes less than 2 seconds.   Neurological:      Mental Status: She is alert.   Psychiatric:         Mood and Affect: Mood normal.         Medications:    Current Facility-Administered Medications:     acetaminophen (TYLENOL) tablet 650 mg, 650 mg, Oral, Q6H PRN, Tere Fregoso PA-C    amitriptyline (ELAVIL) tablet 75 mg, 75 mg, Oral, HS, Tere Fregoso PA-C, 75 mg at 01/08/25 2116    enoxaparin (LOVENOX) subcutaneous injection 40 mg, 40 mg, Subcutaneous, Q24H, Papo Carr DO    escitalopram (LEXAPRO) tablet 20 mg, 20 mg, Oral, HS, Tere Fregoso PA-C, 20 mg at 01/08/25 2116    gabapentin (NEURONTIN) capsule 100 mg, 100 mg, Oral, TID, Tere Fregoso PA-C, 100 mg at 01/09/25 0832    ondansetron (ZOFRAN) injection 4 mg, 4 mg, Intravenous, Q6H PRN, Donte Iqbal MD, 4 mg at 01/09/25 0648    potassium chloride 40 mEq in sodium chloride 0.9 % 250 mL IVPB, 40 mEq, Intravenous, Q6H, Tere Fregoso PA-C, Last Rate: 135 mL/hr at 01/09/25 0831, 40 mEq at 01/09/25 0831    spironolactone (ALDACTONE) tablet 12.5 mg, 12.5 mg, Oral, Daily, Tere Fregoso PA-C, 12.5 mg at 01/09/25 0832    SUMAtriptan (IMITREX) subcutaneous injection 6 mg, 6 mg, Subcutaneous, Once PRN, Tere Fregoso PA-C      Lab Results: I have reviewed the following results:  Results from last 7 days   Lab Units 01/09/25  0832 01/09/25  0622 01/09/25  0004 01/08/25  1548 01/07/25  0712   WBC Thousand/uL  --  10.05  --  7.80  --    HEMOGLOBIN g/dL  --  12.3  --  12.4  --    HEMATOCRIT %  --  40.9  --  39.5  --    PLATELETS Thousands/uL  --  402*  --  407*  --    POTASSIUM mmol/L 3.2*  --  3.1* 2.3* 2.8*   CHLORIDE mmol/L 107  --  106 102 98   CO2 mmol/L 21  --  22 22 21   BUN mg/dL 14  --  18 19 22   CREATININE  "mg/dL 1.03  --  1.12 1.15 1.13   CALCIUM mg/dL 7.7*  --  8.0* 8.4 9.2   MAGNESIUM mg/dL  --  2.4  --  2.4  --    PHOSPHORUS mg/dL  --  3.1  --   --   --    ALBUMIN g/dL  --  4.2  --   --   --        Administrative Statements     Portions of the record may have been created with voice recognition software. Occasional wrong word or \"sound a like\" substitutions may have occurred due to the inherent limitations of voice recognition software. Read the chart carefully and recognize, using context, where substitutions have occurred.If you have any questions, please contact the dictating provider.  "

## 2025-01-09 NOTE — ASSESSMENT & PLAN NOTE
Lab Results   Component Value Date    EGFR 68 01/09/2025    EGFR 61 01/09/2025    EGFR 59 01/08/2025    CREATININE 1.03 01/09/2025    CREATININE 1.12 01/09/2025    CREATININE 1.15 01/08/2025   Patient has a baseline creatinine between 1 to 1.1 mg/dL.  Patient creatinine currently at baseline.  Follow-up with Dr. Ricketts as an outpatient

## 2025-01-09 NOTE — PLAN OF CARE
Problem: PAIN - ADULT  Goal: Verbalizes/displays adequate comfort level or baseline comfort level  Description: Interventions:  - Encourage patient to monitor pain and request assistance  - Assess pain using appropriate pain scale  - Administer analgesics based on type and severity of pain and evaluate response  - Implement non-pharmacological measures as appropriate and evaluate response  - Consider cultural and social influences on pain and pain management  - Notify physician/advanced practitioner if interventions unsuccessful or patient reports new pain  Outcome: Progressing     Problem: INFECTION - ADULT  Goal: Absence or prevention of progression during hospitalization  Description: INTERVENTIONS:  - Assess and monitor for signs and symptoms of infection  - Monitor lab/diagnostic results  - Monitor all insertion sites, i.e. indwelling lines, tubes, and drains  - Monitor endotracheal if appropriate and nasal secretions for changes in amount and color  - [place] appropriate cooling/warming therapies per order  - Administer medications as ordered  - Instruct and encourage patient and family to use good hand hygiene technique  - Identify and instruct in appropriate isolation precautions for identified infection/condition  Outcome: Progressing  Goal: Absence of fever/infection during neutropenic period  Description: INTERVENTIONS:  - Monitor WBC    Outcome: Progressing     Problem: SAFETY ADULT  Goal: Patient will remain free of falls  Description: INTERVENTIONS:  - Educate patient/family on patient safety including physical limitations  - Instruct patient to call for assistance with activity   - Consult OT/PT to assist with strengthening/mobility   - Keep Call bell within reach  - Keep bed low and locked with side rails adjusted as appropriate  - Keep care items and personal belongings within reach  - Initiate and maintain comfort rounds  - Make Fall Risk Sign visible to staff  - Offer Toileting every 2 Hours,  in advance of need  - Apply yellow socks and bracelet for high fall risk patients  - Consider moving patient to room near nurses station  Outcome: Progressing  Goal: Maintain or return to baseline ADL function  Description: INTERVENTIONS:  -  Assess patient's ability to carry out ADLs; assess patient's baseline for ADL function and identify physical deficits which impact ability to perform ADLs (bathing, care of mouth/teeth, toileting, grooming, dressing, etc.)  - Assess/evaluate cause of self-care deficits   - Assess range of motion  - Assess patient's mobility; develop plan if impaired  - Assess patient's need for assistive devices and provide as appropriate  - Encourage maximum independence but intervene and supervise when necessary  - Involve family in performance of ADLs  - Assess for home care needs following discharge   - Consider OT consult to assist with ADL evaluation and planning for discharge  - Provide patient education as appropriate  Outcome: Progressing  Goal: Maintains/Returns to pre admission functional level  Description: INTERVENTIONS:  - Perform AM-PAC 6 Click Basic Mobility/ Daily Activity assessment daily.  - Set and communicate daily mobility goal to care team and patient/family/caregiver.   - Collaborate with rehabilitation services on mobility goals if consulted  - Perform Range of Motion 4 times a day.  - Reposition patient every 2 hours.  - Dangle patient 3 times a day  - Stand patient 3 times a day  - Ambulate patient 3 times a day  - Out of bed to chair 3 times a day   - Out of bed for meals 3 times a day  - Out of bed for toileting  - Record patient progress and toleration of activity level   Outcome: Progressing

## 2025-01-09 NOTE — ASSESSMENT & PLAN NOTE
We are following up on the patient's hypokalemia today, it is improving up to 3.2 mmol/L.,  As expected the patient's etiology is likely extrarenal losses of potassium.  She is currently on IV potassium chloride 40 mEq every 6 hours for a total of about 240 mEq of potassium per day.  We will continue this again at least for the next 24 hours while monitoring the BMP 3 times a day and seeing how the patient is doing tomorrow.  She states that her symptoms of numbness/tingling and chest pressure are getting better, which is a good sign as her potassium is increasing.  We are checking the EKG for any prominent U waves and other ST segment changes that can occur with hypokalemia, which do not seem apparent at this time.  A question was raised about the patient and a potential hypokalemic RTA, and there have been instances on prior blood work where the bicarbonate has been low which at this point I presume would indicate an acidemia.  She has also had a low sodium level so the question at this time whether she was having an episode of diarrhea that was leading to volume depletion and subsequent hyponatremia from ADH stimulation.

## 2025-01-09 NOTE — ASSESSMENT & PLAN NOTE
Continue with extra high-dose vitamin D supplementation with ergocalciferol.  Consider checking an updated vitamin D level to further guide therapy as it has been several months now without an updated vitamin D level

## 2025-01-09 NOTE — UTILIZATION REVIEW
NOTIFICATION OF INPATIENT ADMISSION   AUTHORIZATION REQUEST   SERVICING FACILITY:   [place] [address]  Tax ID:  [id number]  NPI: [id number] ATTENDING PROVIDER:  Attending Name and NPI#: Khushi Garcia Md [[id number]]  Address: [address] [place] [address]  Phone: [phone]     ADMISSION INFORMATION:  Place of Service: Inpatient [facility]  Place of Service Code: 21  Inpatient Admission Date/Time: 1/8/25  2:04 PM  Discharge Date/Time: No discharge date for patient encounter.  Admitting Diagnosis Code/Description:  Hypokalemia [E87.6]     UTILIZATION REVIEW CONTACT:  Juanjose White Utilization   Network Utilization Review Department  Phone: [phone]  Fax [phone]  Email: [email or web address]  Contact for approvals/pending authorizations, clinical reviews, and discharge.     PHYSICIAN ADVISORY SERVICES:  Medical Necessity Denial & Ywlm-hr-Ibhb Review  Phone: [phone]  Fax: [phone]  Email: [email or web address]     DISCHARGE SUPPORT TEAM:  For Patients Discharge Needs & Updates  Phone: [phone] opt. 2 Fax: [phone]  Email: [email or web address]

## 2025-01-09 NOTE — ASSESSMENT & PLAN NOTE
AG-16 on outpatient labs. AG-8 on 01/09 labs.   Possibly starvation ketosis as reports little oral intake over past 3 days   No further anion gap noted on lab work today

## 2025-01-09 NOTE — ASSESSMENT & PLAN NOTE
CT renal stone study abdomen pelvis reviewed -shows bilateral nonobstructing renal collecting system stones.  Patient aware to watch out for symptoms of renal colic   Patient is a 82y old  Female who presents with a chief complaint of Urinary Retention/Hperkalemia with EKG changes (22 Feb 2019 09:55)       Pt is seen and examined  pt is awake and lying in bed  pt seems comfortable and denies any complaints at this time          ROS:  Negative   MEDICATIONS  (STANDING):  ALBUTerol/ipratropium for Nebulization 3 milliLiter(s) Nebulizer every 6 hours  amLODIPine   Tablet 10 milliGRAM(s) Oral daily  aspirin  chewable 81 milliGRAM(s) Oral daily  atorvastatin 20 milliGRAM(s) Oral at bedtime  buDESOnide 160 MICROgram(s)/formoterol 4.5 MICROgram(s) Inhaler 2 Puff(s) Inhalation two times a day  busPIRone 5 milliGRAM(s) Oral three times a day  cefepime   IVPB      cefepime   IVPB 1000 milliGRAM(s) IV Intermittent daily  chlorhexidine 4% Liquid 1 Application(s) Topical <User Schedule>  docusate sodium 200 milliGRAM(s) Oral two times a day  DULoxetine 20 milliGRAM(s) Oral daily  epoetin spencer Injectable 77370 Unit(s) SubCutaneous every 7 days  heparin  Injectable 5000 Unit(s) SubCutaneous every 8 hours  hydrocortisone 2.5% Rectal Cream 1 Application(s) Rectal daily  metoprolol succinate ER 50 milliGRAM(s) Oral daily  senna 2 Tablet(s) Oral daily  vancomycin    Solution 125 milliGRAM(s) Oral every 6 hours    MEDICATIONS  (PRN):  bisacodyl Suppository 10 milliGRAM(s) Rectal every 24 hours PRN Constipation  cloNIDine 0.1 milliGRAM(s) Oral every 8 hours PRN for sbp > 180  HYDROcodone 10 mG/acetaminophen 325 mG 1 Tablet(s) Oral every 4 hours PRN Moderate Pain (4 - 6)  ondansetron Injectable 4 milliGRAM(s) IV Push every 8 hours PRN Nausea and/or Vomiting  simethicone 80 milliGRAM(s) Chew every 8 hours PRN Gas  sodium chloride 3%  Inhalation 3 milliLiter(s) Inhalation every 4 hours PRN ..      Allergies    hair dyes (Hives)  sulfa drugs (Headache)  Zoloft (Headache)    Intolerances        Vital Signs Last 24 Hrs  T(C): 36.7 (22 Feb 2019 14:00), Max: 36.7 (22 Feb 2019 10:12)  T(F): 98 (22 Feb 2019 14:00), Max: 98 (22 Feb 2019 10:12)  HR: 77 (22 Feb 2019 14:00) (77 - 97)  BP: 141/63 (22 Feb 2019 14:00) (133/58 - 154/67)  BP(mean): --  RR: 16 (22 Feb 2019 14:00) (16 - 20)  SpO2: 96% (22 Feb 2019 11:19) (94% - 98%)    PHYSICAL EXAM  General: adult in NAD  HEENT: clear oropharynx, anicteric sclera, pink conjunctiva  Neck: supple  CV: normal S1/S2 with no murmur rubs or gallops  Lungs: positive air movement b/l ant lungs,clear to auscultation, no wheezes, no rales  Abdomen: soft non-tender non-distended, no hepatosplenomegaly  Ext: no clubbing cyanosis or edema  Skin: no rashes and no petechiae  Neuro: alert and oriented X 4, no focal deficits  LABS:                          9.4    26.45 )-----------( 278      ( 22 Feb 2019 07:48 )             29.4         Mean Cell Volume : 102.4 fL  Mean Cell Hemoglobin : 32.8 pg  Mean Cell Hemoglobin Concentration : 32.0 g/dL  Auto Neutrophil # : x  Auto Lymphocyte # : x  Auto Monocyte # : x  Auto Eosinophil # : x  Auto Basophil # : x  Auto Neutrophil % : x  Auto Lymphocyte % : x  Auto Monocyte % : x  Auto Eosinophil % : x  Auto Basophil % : x    Serial CBC's  02-22 @ 07:48  Hct-29.4 / Hgb-9.4 / Plat-278 / RBC-2.87 / WBC-26.45          Serial CBC's  02-20 @ 18:58  Hct-31.0 / Hgb-9.9 / Plat-302 / RBC-3.02 / WBC-25.13          Serial CBC's  02-19 @ 13:10  Hct-32.6 / Hgb-10.3 / Plat-293 / RBC-3.17 / WBC-19.66            02-22    139  |  105  |  50<H>  ----------------------------<  130<H>  5.0   |  27  |  0.8    Ca    7.7<L>      22 Feb 2019 07:48  Phos  3.2     02-20  Mg     2.2     02-20    TPro  4.3<L>  /  Alb  2.5<L>  /  TBili  0.2  /  DBili  x   /  AST  16  /  ALT  15  /  AlkPhos  71  02-22          Platelet Count - Automated: 278 K/uL (02-22-19 @ 07:48)  Hemoglobin: 9.4 g/dL (02-22-19 @ 07:48)  WBC Count: 26.45 K/uL (02-22-19 @ 07:48)  Hematocrit: 29.4 % (02-22-19 @ 07:48)  Hematocrit: 31.0 % (02-20-19 @ 18:58)  Platelet Count - Automated: 302 K/uL (02-20-19 @ 18:58)  Hemoglobin: 9.9 g/dL (02-20-19 @ 18:58)  WBC Count: 25.13 K/uL (02-20-19 @ 18:58)  Hemoglobin: 10.3 g/dL (02-19-19 @ 13:10)  WBC Count: 19.66 K/uL (02-19-19 @ 13:10)  Hematocrit: 32.6 % (02-19-19 @ 13:10)  Platelet Count - Automated: 293 K/uL (02-19-19 @ 13:10)  WBC Count: 20.00 K/uL (02-17-19 @ 08:42)  Hematocrit: 31.3 % (02-17-19 @ 08:42)  Platelet Count - Automated: 230 K/uL (02-17-19 @ 08:42)  Hemoglobin: 9.9 g/dL (02-17-19 @ 08:42)  WBC Count: 24.04 K/uL (02-16-19 @ 15:09)  Hematocrit: 30.3 % (02-16-19 @ 15:09)  Platelet Count - Automated: 213 K/uL (02-16-19 @ 15:09)  Hemoglobin: 9.9 g/dL (02-16-19 @ 15:09)  Platelet Count - Automated: 214 K/uL (02-15-19 @ 06:13)  Hemoglobin: 10.1 g/dL (02-15-19 @ 06:13)  WBC Count: 34.64 K/uL (02-15-19 @ 06:13)  Hematocrit: 30.7 % (02-15-19 @ 06:13)  Hematocrit: 29.4 % (02-13-19 @ 14:50)  Platelet Count - Automated: 219 K/uL (02-13-19 @ 14:50)  Hemoglobin: 9.7 g/dL (02-13-19 @ 14:50)  WBC Count: 38.51 K/uL (02-13-19 @ 14:50)                BLOOD SMEAR INTERPRETATION:       RADIOLOGY & ADDITIONAL STUDIES:

## 2025-01-09 NOTE — PROGRESS NOTES
Progress Note - Hospitalist   Name: Brittani Patino 39 y.o. female I MRN: [id number]  Unit/Bed#: 407-01 I Date of Admission: 1/8/2025   Date of Service: 1/9/2025 I Hospital Day: 1    Assessment & Plan  Hypokalemia  40 yo female PMHx gastric bypass, severe hypokalemia maintained on daily 80mEq IV potassium infusions presents with symptomatic hypokalemia with right hand & facial paresthesias, chest pressure, nausea & poor appetite.    Continue spironolactone   K 2.8 on outpatient labs   Potassium now 3.2 as of 01/09; Magnesium 2.4   Telemetry discontinued.   Nephrology consult  Suspected extrarenal potassium wasting w/ workup completed prior hospitalization. Also w/ poor absorption with prior gastric bypass  Recs IV potassium chloride 40mEq every 6 hours   Serial BMP   Continue potassium repletion.     High anion gap metabolic acidosis  AG-16 on outpatient labs. AG-8 on 01/09 labs.   Possibly starvation ketosis as reports little oral intake over past 3 days   No further anion gap noted on lab work today  History of gastric bypass  Follow with dietitian and nephrology for nutrient and electrolyte management.   Continue to monitor for any deficiencies.     Chronic migraine without aura without status migrainosus, not intractable  Currently controlled on Amitriptyline and Sumatriptan as needed.   Continue to monitor   Vitamin B12 deficiency  Maintained on B12 injections  Vitamin D insufficiency  Resume vitamin D supplement on discharge  Stage 3a chronic kidney disease (CKD) ([facility])  Lab Results   Component Value Date    EGFR 68 01/09/2025    EGFR 61 01/09/2025    EGFR 59 01/08/2025    CREATININE 1.03 01/09/2025    CREATININE 1.12 01/09/2025    CREATININE 1.15 01/08/2025     At baseline  Nephrolithiasis      VTE Pharmacologic Prophylaxis:   High Risk (Score >/= 5) - Pharmacological DVT Prophylaxis Ordered: enoxaparin (Lovenox). Sequential Compression Devices Ordered.    Mobility:   Basic Mobility Inpatient Raw Score:  24  JH-HLM Goal: 8: Walk 250 feet or more  JH-HLM Achieved: 8: Walk 250 feet ot more  JH-HLM Goal achieved. Continue to encourage appropriate mobility.    Patient Centered Rounds: I performed bedside rounds with nursing staff today.   Discussions with Specialists or Other Care Team Provider: RODOLFO, nephrology    Education and Discussions with Family / Patient: Patient declined call to .     Current Length of Stay: 1 day(s)  Current Patient Status: Inpatient   Certification Statement: The patient will continue to require additional inpatient hospital stay due to hypokalemia  Discharge Plan: Anticipate discharge tomorrow to home.    Code Status: Level 1 - Full Code    Subjective   Patient still experiencing paresthesias around lips and right hand, but chest discomfort has subsided. Appetite is returning to normal and nausea is still present, but has improved since Zofran administration. Patient denies change in diarrhea, vomiting, and SOB. Had two bowel movements since admission.    Patient was concerned as to why she was receiving a cardiac work-up, as she never has before. Explained to patient that this was just a precaution due to how low her potassium was. Patient is leaving for [place] in 8 days and wants potassium to be stable before leaving the country.    Objective :  Temp:  [97.4 °F (36.3 °C)-99 °F (37.2 °C)] 98.5 °F (36.9 °C)  HR:  [62-90] 76  BP: (113-117)/(72-74) 113/72  Resp:  [15-20] 20  SpO2:  [92 %-98 %] 95 %  O2 Device: None (Room air)    Body mass index is 22.46 kg/m².     Input and Output Summary (last 24 hours):   No intake or output data in the 24 hours ending 01/09/25 1407    Physical Exam  Constitutional:       Appearance: Normal appearance.   HENT:      Head: Normocephalic and atraumatic.   Cardiovascular:      Rate and Rhythm: Normal rate and regular rhythm.      Heart sounds: Normal heart sounds.   Pulmonary:      Effort: Pulmonary effort is normal.      Breath sounds: Normal breath  sounds.   Abdominal:      General: Bowel sounds are normal. There is no distension.      Palpations: Abdomen is soft.   Musculoskeletal:      Cervical back: Normal range of motion and neck supple.      Right lower leg: No edema.      Left lower leg: No edema.   Skin:     General: Skin is warm and dry.   Neurological:      General: No focal deficit present.      Mental Status: She is alert and oriented to person, place, and time.           Lines/Drains:      Central Line:  Goal for removal: N/A - Chronic PICC               Lab Results: I have reviewed the following results:   Results from last 7 days   Lab Units 01/09/25  0622   WBC Thousand/uL 10.05   HEMOGLOBIN g/dL 12.3   HEMATOCRIT % 40.9   PLATELETS Thousands/uL 402*   SEGS PCT % 76*   LYMPHO PCT % 16   MONO PCT % 6   EOS PCT % 1     Results from last 7 days   Lab Units 01/09/25  0832 01/09/25  0622   SODIUM mmol/L 136  --    POTASSIUM mmol/L 3.2*  --    CHLORIDE mmol/L 107  --    CO2 mmol/L 21  --    BUN mg/dL 14  --    CREATININE mg/dL 1.03  --    ANION GAP mmol/L 8  --    CALCIUM mg/dL 7.7*  --    ALBUMIN g/dL  --  4.2   TOTAL BILIRUBIN mg/dL  --  0.42   ALK PHOS U/L  --  54   ALT U/L  --  13   AST U/L  --  18   GLUCOSE RANDOM mg/dL 94  --              Results from last 7 days   Lab Units 01/07/25  0712   HEMOGLOBIN A1C % 5.6     Results from last 7 days   Lab Units 01/09/25  0622   PROCALCITONIN ng/ml 0.10       Recent Cultures (last 7 days):         Imaging Results Review: No pertinent imaging studies reviewed.  Other Study Results Review: No additional pertinent studies reviewed.    Last 24 Hours Medication List:     Current Facility-Administered Medications:     acetaminophen (TYLENOL) tablet 650 mg, Q6H PRN    amitriptyline (ELAVIL) tablet 75 mg, HS    enoxaparin (LOVENOX) subcutaneous injection 40 mg, Q24H    escitalopram (LEXAPRO) tablet 20 mg, HS    gabapentin (NEURONTIN) capsule 100 mg, TID    ondansetron (ZOFRAN) injection 4 mg, Q6H PRN    potassium  chloride 40 mEq in sodium chloride 0.9 % 250 mL IVPB, Q6H, Last Rate: 40 mEq (01/09/25 0831)    spironolactone (ALDACTONE) tablet 12.5 mg, Daily    SUMAtriptan (IMITREX) subcutaneous injection 6 mg, Once PRN    Administrative Statements   Today, Patient Was Seen By: Korina Acosta PA-C   I have spent a total time of 43 minutes in caring for this patient on the day of the visit/encounter including Diagnostic results, Prognosis, Risks and benefits of tx options, Instructions for management, Patient and family education, Impressions, Counseling / Coordination of care, Documenting in the medical record, Reviewing / ordering tests, medicine, procedures  , Obtaining or reviewing history  , and Communicating with other healthcare professionals .    **Please Note: This note may have been constructed using a voice recognition system.**

## 2025-01-09 NOTE — UTILIZATION REVIEW
Initial Clinical Review    Admission: Date/Time/Statement:   Admission Orders (From admission, onward)       Ordered        01/08/25 1438  Inpatient Admission  Once                          Orders Placed This Encounter   Procedures    Inpatient Admission     Standing Status:   Standing     Number of Occurrences:   1     Level of Care:   Med Surg [16]     Estimated length of stay:   More than 2 Midnights     Certification:   I certify that inpatient services are medically necessary for this patient for a duration of greater than two midnights. See H&P and MD Progress Notes for additional information about the patient's course of treatment.       Initial Presentation: 39 y.o. female  presents as direct admit on 1-8-25  as advised by nephrologist for evaluation and treatment of  hypokalemia of unclear etiology. She reports facial paresthesias, right hand paresthesias  and chest heaviness this morning this am. Potassium 2.8.  She is maintained on daily IV potassium infusions of 10millimoles over 8 hours as she is sleeping. PMH of [place] en Y gastric bypass, refractory hypokalemia, migraines, CKD, and chronic anemia who presents with symptomatic hypokalemia. Admit to inpatient med surg for symptomatic hypokalemia.  Plan includes : nephrology consult.      Nephrology consult: continue iv Kcl infusions, monitor K levels, monitor on telemetry.  She has had full work up in recent past. Requires maintenance of somatic K via iv route due to poor oral absorption.  Monitor Mag levels.  Basline cr 1-1.1.       Anticipated Length of Stay/Certification Statement: Patient will be admitted on an inpatient basis with an anticipated length of stay of greater than 2 midnights secondary to severe symptomatic hypokalemia.     Date: 1-9-25    Day 2: inpatient med surg  Certification Statement: The patient will continue to require additional inpatient hospital stay due to hypokalemia  Discharge Plan: Anticipate discharge tomorrow to  home.  Suspected extrarenal potassium wasting w/ workup completed prior hospitalization. Also w/ poor absorption with prior gastric bypass.  Today : K 3.2 Mag 2.4    Telemetry discontinued.   Plan for iv Kcl 40 meq q6 hr, serial BMP.  Check EKG to rule out U waves or ST segment changes that may occur with hypokalemia.  Also question if diarrhea is playing a part in volume deletion and subsequent hyponatremia from ADH stimulation. Patient has unintentional 12 lb wt loss.      ED Treatment-Medication Administration - No Administrations Displayed (No Start Event Found)       None            Scheduled Medications:  amitriptyline, 75 mg, Oral, HS  enoxaparin, 40 mg, Subcutaneous, Q24H  escitalopram, 20 mg, Oral, HS  gabapentin, 100 mg, Oral, TID  potassium chloride, 40 mEq, Intravenous, Q6H  spironolactone, 12.5 mg, Oral, Daily      Continuous IV Infusions:     PRN Meds:  acetaminophen, 650 mg, Oral, Q6H PRN  ondansetron, 4 mg, Intravenous, Q6H PRN  SUMAtriptan, 6 mg, Subcutaneous, Once PRN      ED Triage Vitals   Temperature Pulse Respirations Blood Pressure SpO2 Pain Score   01/08/25 1433 01/08/25 1438 01/08/25 1438 01/08/25 1433 01/08/25 1438 01/08/25 1417   98.5 °F (36.9 °C) 62 18 117/74 92 % No Pain     Weight (last 2 days)       Date/Time Weight    01/09/25 07:01:19 55.7 (122.8)    01/08/25 14:38:54 55.7 (122.8)            Vital Signs (last 3 days)       Date/Time Temp Pulse Resp BP MAP (mmHg) SpO2 O2 Device Pain    01/09/25 14:10:27 98.6 °F (37 °C) 87 20 112/69 83 96 % -- --    01/09/25 1212 -- -- -- -- -- 95 % None (Room air) No Pain    01/09/25 07:01:19 98.5 °F (36.9 °C) 76 20 113/72 86 98 % -- --    01/08/25 23:17:04 97.4 °F (36.3 °C) 76 15 114/73 87 97 % -- --    01/08/25 2116 -- -- -- -- -- -- -- No Pain    01/08/25 1919 99 °F (37.2 °C) 85 18 116/73 87 95 % -- --    01/08/25 14:38:54 98.5 °F (36.9 °C) 90 16 -- -- 97 % None (Room air) --    01/08/25 1438 -- 62 18 -- -- 92 % -- --    01/08/25 14:33:15 98.5 °F  (36.9 °C) -- -- 117/74 88 -- -- --    01/08/25 1417 -- -- -- -- -- -- -- No Pain         Pertinent Labs/Diagnostic Test Results:     Radiology:  No orders to display     Cardiology:  Echo complete w/ contrast if indicated   Final  (01/09 1226)        Left Ventricle: Left ventricular cavity size is normal. Wall thickness    is normal. The left ventricular ejection fraction is 65%. Systolic    function is normal. Wall motion is normal. Diastolic function is normal for age.        Right Ventricle: Right ventricular cavity size is normal. Systolic    function is normal.     GI:  No orders to display           Results from last 7 days   Lab Units 01/09/25 0622 01/08/25  1548   WBC Thousand/uL 10.05 7.80   HEMOGLOBIN g/dL 12.3 12.4   HEMATOCRIT % 40.9 39.5   PLATELETS Thousands/uL 402* 407*   TOTAL NEUT ABS Thousands/µL 7.67* 5.49         Results from last 7 days   Lab Units 01/09/25  0832 01/09/25  0622 01/09/25 0004 01/08/25  1548 01/07/25  0712   SODIUM mmol/L 136  --  137 136 135   POTASSIUM mmol/L 3.2*  --  3.1* 2.3* 2.8*   CHLORIDE mmol/L 107  --  106 102 98   CO2 mmol/L 21  --  22 22 21   ANION GAP mmol/L 8  --  9 12 16*   BUN mg/dL 14  --  18 19 22   CREATININE mg/dL 1.03  --  1.12 1.15 1.13   EGFR ml/min/1.73sq m 68  --  61 59 60   CALCIUM mg/dL 7.7*  --  8.0* 8.4 9.2   MAGNESIUM mg/dL  --  2.4  --  2.4  --    PHOSPHORUS mg/dL  --  3.1  --   --   --      Results from last 7 days   Lab Units 01/09/25  0622   AST U/L 18   ALT U/L 13   ALK PHOS U/L 54   TOTAL PROTEIN g/dL 7.6   ALBUMIN g/dL 4.2   TOTAL BILIRUBIN mg/dL 0.42   BILIRUBIN DIRECT mg/dL 0.07         Results from last 7 days   Lab Units 01/09/25  0832 01/09/25 0004 01/08/25  1548 01/07/25  0712   GLUCOSE RANDOM mg/dL 94 82 91 102         Results from last 7 days   Lab Units 01/07/25  0712   HEMOGLOBIN A1C % 5.6   EAG mg/dl 114     Results from last 7 days   Lab Units 01/08/25  1924 01/08/25  1731   HS TNI 0HR ng/L  --  4   HS TNI 2HR ng/L 4  --    HSTNI  D2 ng/L 0  --        Results from last 7 days   Lab Units 01/09/25  0622   PROCALCITONIN ng/ml 0.10     Results from last 7 days   Lab Units 01/09/25  0622   FERRITIN ng/mL 9*     Results from last 7 days   Lab Units 01/08/25  1739   CREATININE UR mg/dL 149.1       Past Medical History:   Diagnosis Date    C. difficile colitis 2016    COVID-19     1/2022- pt denies long covid    DUB (dysfunctional uterine bleeding)     H. pylori infection     Hypertension     Hypokalemia     Kidney stone November 2023    Left lower quadrant abdominal pain 08/17/2020    Migraine     WAGNER (obstructive sleep apnea) 06/11/2014    Pancreatitis     Psychiatric disorder     Anxiety    Rectal bleeding     Renal disorder     Rhabdomyolysis     Sleep apnea     resolved with bariatric surgery    Thrombosed external hemorrhoid      Present on Admission:     Hypokalemia   Chronic migraine without aura without status migrainosus, not intractable   Vitamin B12 deficiency   Vitamin D insufficiency   Nephrolithiasis      Admitting Diagnosis: Hypokalemia [E87.6]    Age/Sex: 39 y.o. female    Network Utilization Review Department  ATTENTION: Please call with any questions or concerns to [phone] and carefully listen to the prompts so that you are directed to the right person. All voicemails are confidential.   For Discharge needs, contact Care Management DC Support Team at [phone] opt. [phone]  Send all requests for admission clinical reviews, approved or denied determinations and any other requests to dedicated fax number below belonging to the campus where the patient is receiving treatment. List of dedicated fax numbers for the Facilities:  FACILITY NAME UR FAX NUMBER   ADMISSION DENIALS (Administrative/Medical Necessity) [phone]   DISCHARGE SUPPORT TEAM (NETWORK) [phone]   PARENT CHILD HEALTH (Maternity/NICU/Pediatrics) [phone]   [facility] [phone]   [facility]  [phone]   [facility] [phone]   [facility] [phone]   [facility] [phone]   [facility] [phone]   [facility] [phone]   [facility] [phone]   [facility] [phone]   [facility] [phone]   [facility] [phone]   [facility] [phone]

## 2025-01-09 NOTE — ASSESSMENT & PLAN NOTE
Lab Results   Component Value Date    EGFR 68 01/09/2025    EGFR 61 01/09/2025    EGFR 59 01/08/2025    CREATININE 1.03 01/09/2025    CREATININE 1.12 01/09/2025    CREATININE 1.15 01/08/2025     At baseline

## 2025-01-09 NOTE — ASSESSMENT & PLAN NOTE
38 yo female PMHx gastric bypass, severe hypokalemia maintained on daily 80mEq IV potassium infusions presents with symptomatic hypokalemia with right hand & facial paresthesias, chest pressure, nausea & poor appetite.    Continue spironolactone   K 2.8 on outpatient labs   Potassium now 3.2 as of 01/09; Magnesium 2.4   Telemetry discontinued.   Nephrology consult  Suspected extrarenal potassium wasting w/ workup completed prior hospitalization. Also w/ poor absorption with prior gastric bypass  Recs IV potassium chloride 40mEq every 6 hours   Serial BMP   Continue potassium repletion.

## 2025-01-09 NOTE — CASE MANAGEMENT
Case Management Progress Note    Patient name Brittani Patino  Location /407-01 MRN [id number]  : 1985 Date 2025       LOS (days): 1  Geometric Mean LOS (GMLOS) (days):   Days to GMLOS:        OBJECTIVE:        Current admission status: Inpatient  Preferred Pharmacy:   [facility] # [id number] [address] [address]  Phone: [phone] Fax: [phone]    [facility]/pharmacy #[id number] - [place] [id number] [address] [address]  Phone: [phone] Fax: [phone]    Primary Care Provider: Clarence Ingram MD    Primary Insurance: [employer]  Secondary Insurance:     PROGRESS NOTE:chart reviewed. No current discharge needs. CM following.

## 2025-01-10 LAB
25(OH)D3 SERPL-MCNC: 9.7 NG/ML (ref 30–100)
ANION GAP SERPL CALCULATED.3IONS-SCNC: 10 MMOL/L (ref 4–13)
ANION GAP SERPL CALCULATED.3IONS-SCNC: 7 MMOL/L (ref 4–13)
ANION GAP SERPL CALCULATED.3IONS-SCNC: 9 MMOL/L (ref 4–13)
ATRIAL RATE: 83 BPM
BASE EX.OXY STD BLDV CALC-SCNC: 94.4 % (ref 60–80)
BASE EXCESS BLDV CALC-SCNC: -5.8 MMOL/L
BUN SERPL-MCNC: 11 MG/DL (ref 5–25)
BUN SERPL-MCNC: 13 MG/DL (ref 5–25)
BUN SERPL-MCNC: 13 MG/DL (ref 5–25)
CALCIUM SERPL-MCNC: 8 MG/DL (ref 8.4–10.2)
CALCIUM SERPL-MCNC: 8.1 MG/DL (ref 8.4–10.2)
CALCIUM SERPL-MCNC: 8.2 MG/DL (ref 8.4–10.2)
CHLORIDE SERPL-SCNC: 105 MMOL/L (ref 96–108)
CHLORIDE SERPL-SCNC: 107 MMOL/L (ref 96–108)
CHLORIDE SERPL-SCNC: 107 MMOL/L (ref 96–108)
CO2 SERPL-SCNC: 20 MMOL/L (ref 21–32)
CREAT SERPL-MCNC: 1.04 MG/DL (ref 0.6–1.3)
CREAT SERPL-MCNC: 1.12 MG/DL (ref 0.6–1.3)
CREAT SERPL-MCNC: 1.16 MG/DL (ref 0.6–1.3)
GFR SERPL CREATININE-BSD FRML MDRD: 59 ML/MIN/1.73SQ M
GFR SERPL CREATININE-BSD FRML MDRD: 61 ML/MIN/1.73SQ M
GFR SERPL CREATININE-BSD FRML MDRD: 67 ML/MIN/1.73SQ M
GLUCOSE SERPL-MCNC: 87 MG/DL (ref 65–140)
GLUCOSE SERPL-MCNC: 90 MG/DL (ref 65–140)
GLUCOSE SERPL-MCNC: 92 MG/DL (ref 65–140)
HCO3 BLDV-SCNC: 19.5 MMOL/L (ref 24–30)
O2 CT BLDV-SCNC: 15.2 ML/DL
P AXIS: 40 DEGREES
PCO2 BLDV: 37.5 MM HG (ref 42–50)
PH BLDV: 7.33 [PH] (ref 7.3–7.4)
PO2 BLDV: 87.9 MM HG (ref 35–45)
POTASSIUM SERPL-SCNC: 2.9 MMOL/L (ref 3.5–5.3)
POTASSIUM SERPL-SCNC: 3.5 MMOL/L (ref 3.5–5.3)
POTASSIUM SERPL-SCNC: 3.7 MMOL/L (ref 3.5–5.3)
PR INTERVAL: 124 MS
QRS AXIS: 60 DEGREES
QRSD INTERVAL: 106 MS
QT INTERVAL: 400 MS
QTC INTERVAL: 470 MS
SODIUM SERPL-SCNC: 134 MMOL/L (ref 135–147)
SODIUM SERPL-SCNC: 135 MMOL/L (ref 135–147)
SODIUM SERPL-SCNC: 136 MMOL/L (ref 135–147)
T WAVE AXIS: 107 DEGREES
VENTRICULAR RATE: 83 BPM

## 2025-01-10 PROCEDURE — 82805 BLOOD GASES W/O2 SATURATION: CPT | Performed by: PHYSICIAN ASSISTANT

## 2025-01-10 PROCEDURE — 99232 SBSQ HOSP IP/OBS MODERATE 35: CPT | Performed by: INTERNAL MEDICINE

## 2025-01-10 PROCEDURE — 80048 BASIC METABOLIC PNL TOTAL CA: CPT | Performed by: PHYSICIAN ASSISTANT

## 2025-01-10 PROCEDURE — 93010 ELECTROCARDIOGRAM REPORT: CPT | Performed by: INTERNAL MEDICINE

## 2025-01-10 PROCEDURE — 82306 VITAMIN D 25 HYDROXY: CPT | Performed by: INTERNAL MEDICINE

## 2025-01-10 PROCEDURE — 99232 SBSQ HOSP IP/OBS MODERATE 35: CPT | Performed by: PHYSICIAN ASSISTANT

## 2025-01-10 RX ORDER — ERGOCALCIFEROL 1.25 MG/1
50000 CAPSULE, LIQUID FILLED ORAL 2 TIMES WEEKLY
Status: DISCONTINUED | OUTPATIENT
Start: 2025-01-10 | End: 2025-01-11 | Stop reason: HOSPADM

## 2025-01-10 RX ADMIN — ERGOCALCIFEROL 50000 UNITS: 1.25 CAPSULE ORAL at 21:36

## 2025-01-10 RX ADMIN — GABAPENTIN 100 MG: 100 CAPSULE ORAL at 08:25

## 2025-01-10 RX ADMIN — POTASSIUM CHLORIDE 40 MEQ: 2 INJECTION, SOLUTION, CONCENTRATE INTRAVENOUS at 08:25

## 2025-01-10 RX ADMIN — AMITRIPTYLINE HYDROCHLORIDE 75 MG: 25 TABLET, FILM COATED ORAL at 21:36

## 2025-01-10 RX ADMIN — POTASSIUM CHLORIDE 40 MEQ: 2 INJECTION, SOLUTION, CONCENTRATE INTRAVENOUS at 20:05

## 2025-01-10 RX ADMIN — ESCITALOPRAM OXALATE 20 MG: 10 TABLET ORAL at 21:36

## 2025-01-10 RX ADMIN — POTASSIUM CHLORIDE 40 MEQ: 2 INJECTION, SOLUTION, CONCENTRATE INTRAVENOUS at 14:26

## 2025-01-10 RX ADMIN — GABAPENTIN 100 MG: 100 CAPSULE ORAL at 17:12

## 2025-01-10 RX ADMIN — POTASSIUM CHLORIDE 40 MEQ: 2 INJECTION, SOLUTION, CONCENTRATE INTRAVENOUS at 02:52

## 2025-01-10 RX ADMIN — SPIRONOLACTONE 12.5 MG: 25 TABLET ORAL at 08:27

## 2025-01-10 RX ADMIN — GABAPENTIN 100 MG: 100 CAPSULE ORAL at 21:36

## 2025-01-10 NOTE — ASSESSMENT & PLAN NOTE
AG-16 on outpatient labs. AG-8 on 01/09 labs.   Possibly starvation ketosis as reports little oral intake over past 3 days   Anion gap returned, check a VBG

## 2025-01-10 NOTE — ASSESSMENT & PLAN NOTE
Lab Results   Component Value Date    EGFR 61 01/10/2025    EGFR 59 01/09/2025    EGFR 68 01/09/2025    CREATININE 1.12 01/10/2025    CREATININE 1.15 01/09/2025    CREATININE 1.03 01/09/2025   Patient has a baseline creatinine between 1 to 1.1 mg/dL.  Patient creatinine currently at baseline.  The patient follows with Dr. Ricketts as an outpatient

## 2025-01-10 NOTE — PLAN OF CARE
Problem: PAIN - ADULT  Goal: Verbalizes/displays adequate comfort level or baseline comfort level  Description: Interventions:  - Encourage patient to monitor pain and request assistance  - Assess pain using appropriate pain scale (0-10 pain scale)  - Administer analgesics based on type and severity of pain and evaluate response  - Implement non-pharmacological measures as appropriate and evaluate response  - Consider cultural and social influences on pain and pain management  - Notify physician/advanced practitioner if interventions unsuccessful or patient reports new pain  Outcome: Progressing     Problem: INFECTION - ADULT  Goal: Absence or prevention of progression during hospitalization  Description: INTERVENTIONS:  - Assess and monitor for signs and symptoms of infection  - Monitor lab/diagnostic results  - Monitor all insertion sites, i.e. indwelling lines  - Administer medications as ordered  - Instruct and encourage patient and family to use good hand hygiene technique  Outcome: Progressing     Problem: SAFETY ADULT  Goal: Patient will remain free of falls  Description: INTERVENTIONS:  - Educate patient/family on patient safety including physical limitations  - Instruct patient to call for assistance with activity (independent)  - Consult OT/PT to assist with strengthening/mobility   - Keep Call bell within reach  - Keep bed low and locked with side rails adjusted as appropriate  - Keep care items and personal belongings within reach  - Initiate and maintain comfort rounds  - Make Fall Risk Sign visible to staff (low fall risk)  - Offer Toileting every 1-2 Hours, in advance of need  - Obtain necessary fall risk management equipment: nonskid footwear  Outcome: Progressing  Goal: Maintain or return to baseline ADL function  Description: INTERVENTIONS:  -  Assess patient's ability to carry out ADLs; (independent)   - Assess range of motion  - Assess patient's mobility; (independent)  - Assess patient's need  for assistive devices and provide as appropriate  - Encourage maximum independence but intervene and supervise when necessary  - Involve family in performance of ADLs  - Assess for home care needs following discharge   - Consider OT consult to assist with ADL evaluation and planning for discharge  - Provide patient education as appropriate  Outcome: Progressing  Goal: Maintains/Returns to pre admission functional level  Description: INTERVENTIONS:  - Perform AM-PAC 6 Click Basic Mobility/ Daily Activity assessment daily.  - Set and communicate daily mobility goal to care team and patient/family/caregiver.   - Collaborate with rehabilitation services on mobility goals if consulted  - Ambulate patient 4-6 times a day  - Out of bed to chair 3 times a day   - Out of bed for meals 3 times a day  - Out of bed for toileting  - Record patient progress and toleration of activity level   Outcome: Progressing     Problem: DISCHARGE PLANNING  Goal: Discharge to home or other facility with appropriate resources  Description: INTERVENTIONS:  - Identify barriers to discharge w/patient and caregiver  - Arrange for needed discharge resources and transportation as appropriate  - Identify discharge learning needs (meds, wound care, etc.)  - Refer to Case Management Department for coordinating discharge planning if the patient needs post-hospital services based on physician/advanced practitioner order or complex needs related to functional status, cognitive ability, or social support system  Outcome: Progressing     Problem: Knowledge Deficit  Goal: Patient/family/caregiver demonstrates understanding of disease process, treatment plan, medications, and discharge instructions  Description: Complete learning assessment and assess knowledge base.  Interventions:  - Provide teaching at level of understanding  - Provide teaching via preferred learning methods  Outcome: Progressing     Problem: Nutrition/Hydration-ADULT  Goal:  Nutrient/Hydration intake appropriate for improving, restoring or maintaining nutritional needs  Description: Monitor and assess patient's nutrition/hydration status for malnutrition. Collaborate with interdisciplinary team and initiate plan and interventions as ordered.  Monitor patient's weight and dietary intake as ordered or per policy. Utilize nutrition screening tool and intervene as necessary. Determine patient's food preferences and provide high-protein, high-caloric foods as appropriate.     INTERVENTIONS:  - Monitor oral intake, urinary output, labs, and treatment plans  - Assess nutrition and hydration status and recommend course of action  - Evaluate amount of meals eaten  - Assist patient with eating if necessary   - Allow adequate time for meals  - Recommend/ encourage appropriate diets, oral nutritional supplements, and vitamin/mineral supplements  - Provide specific nutrition/hydration education as appropriate  - Include patient/family/caregiver in decisions related to nutrition  Outcome: Progressing     Problem: GASTROINTESTINAL - ADULT  Goal: Maintains adequate nutritional intake  Description: INTERVENTIONS:  - Monitor percentage of each meal consumed  - Identify factors contributing to decreased intake, treat as appropriate  - Assist with meals as needed  - Monitor I&O, weight, and lab values if indicated  - Obtain nutrition services referral as needed  Outcome: Progressing     Problem: METABOLIC, FLUID AND ELECTROLYTES - ADULT  Goal: Electrolytes maintained within normal limits  Description: INTERVENTIONS:  - Monitor labs and assess patient for signs and symptoms of electrolyte imbalances  - Administer electrolyte replacement as ordered  - Monitor response to electrolyte replacements, including repeat lab results as appropriate  - Instruct patient on fluid and nutrition as appropriate  Outcome: Progressing

## 2025-01-10 NOTE — ASSESSMENT & PLAN NOTE
Lab Results   Component Value Date    EGFR 67 01/10/2025    EGFR 61 01/10/2025    EGFR 59 01/09/2025    CREATININE 1.04 01/10/2025    CREATININE 1.12 01/10/2025    CREATININE 1.15 01/09/2025     At baseline of 1.0

## 2025-01-10 NOTE — ASSESSMENT & PLAN NOTE
Continue with extra high-dose vitamin D supplementation with ergocalciferol.  Check vitamin D level.

## 2025-01-10 NOTE — PROGRESS NOTES
NEPHROLOGY HOSPITAL PROGRESS NOTE   Brittani Patino 39 y.o. female MRN: [id number]  Unit/Bed#: 407-01 Encounter: [id number]  Reason for Consult: Hypokalemia    Assessment & Plan  Hypokalemia  The etiology of the patient's hypokalemia is decreased GI absorption/extrarenal losses.  The patient had a urine potassium/creatinine ratio less than 13.  The patient is currently getting IV potassium 40 mEq every 6 hours.  The goal is for the patient to increase her oral intake is much as possible to match what she had been doing as an outpatient with oral intake of nutrition prior to coming into the hospital although again the patient required 80 mill equivalents IV each day.    Would maintain current IV dosing for the next 24 hours.  The patient is liberalized on a regular diet although not sure how intact the absorption is chronically.  Certainly there was an acute aspect questionable viral or other transient illness when the patient came in that got the patient out of the delicate balance she was in between the IV potassium replacement as well as oral intake from a nutritional standpoint that she was maintaining.  Will try adding Ensure max and see how patient does with this.  Again with supplements there are several factors first would the patient be able to tolerate it from a GI perspective, how much of this would be able to be absorbed but just trying to optimize nutrition here is much as possible to try to get patient back in the prior steady state as the patient is requiring a higher amount of IV potassium than she normally does as an outpatient.  Her magnesium is stable at 2.4 from January 9 and Phos is 3.1 from January 9.  A.m. labs are pending.  Will follow-up on a.m. labs.  Recheck urine sodium, urine potassium and urine creatinine.      History of gastric bypass  This is the leading and likely etiology of the patient's hypokalemia with GI malabsorption.  Nephrolithiasis  CT renal stone study abdomen pelvis reviewed  -shows bilateral nonobstructing renal collecting system stones.  While it is noted that patients with history of GI malabsorption are often at increased risk for calcium oxalate stones due to enteric hyperoxaluria it was noted the patient's prior kidney stone had been questionable phosphate stone.  Need to further clarify.  Vitamin D insufficiency  Continue with extra high-dose vitamin D supplementation with ergocalciferol.  Check vitamin D level.  Stage 3a chronic kidney disease (CKD) ([facility])  Lab Results   Component Value Date    EGFR 61 01/10/2025    EGFR 59 01/09/2025    EGFR 68 01/09/2025    CREATININE 1.12 01/10/2025    CREATININE 1.15 01/09/2025    CREATININE 1.03 01/09/2025   Patient has a baseline creatinine between 1 to 1.1 mg/dL.  Patient creatinine currently at baseline.  The patient follows with Dr. Ricketts as an outpatient    Administrative Statements   VIRTUAL CARE DOCUMENTATION:     1. This service was provided via Telemedicine using ehealthtracker Kit     2. Parties in the room with patient during teleconsult Patient only    3. Confidentiality My office door was closed     4. Participants No one else was in the room    5. Patient acknowledged consent and understanding of privacy and security of the  Telemedicine consult. I informed the patient that I have reviewed their record in Epic and presented the opportunity for them to ask any questions regarding the visit today.  The patient agreed to participate.    6. I have spent a total time of approximately 30 minutes in caring for this patient on the day of the visit/encounter including documentation of medical decision making, creation of plan of care, patient visit, brief discussion with the patient's nurse via [email or web address] secure chat, review of the patient record, creation of assessment and plan and order placement not including the time spent for establishing the audio/video connection.        SUBJECTIVE / 24H INTERVAL HISTORY:  Ms. Patino is seen in  follow-up for hypokalemia this morning.  She states that her appetite is slowly improving.  It was noted that her potassium overnight had decreased down to 2.9.  Chart notes and records reviewed.  She does have a history of a gastric bypass and on a daily basis is on 80 mEq of potassium that the patient gets intravenously daily at home.  With that she also had regular oral intake which was needed to maintain a normal potassium level.  It had been decreased prior to coming to the hospital.  Again she states this morning she is slowly increasing her oral intake.  We had talked about supplements to see how she would do with that and if she would tolerate it from a GI perspective but also to see if we can supplement nutrition as well as add additional potassium from that standpoint.  She was amenable to try this.    Systolic blood pressure range has been anywhere from 112-178 systolic.  Pulse oximetry is 99% room air.  Her current weight on January 9 was 122 pounds in 3 months prior she had been 124 pounds.  General: Patient was resting comfortably no acute distress    OBJECTIVE:  Current Weight: Weight - Scale: 55.7 kg (122 lb 12.7 oz)  Vitals:    01/09/25 1212 01/09/25 1410 01/09/25 2214 01/10/25 0656   BP:  112/69 112/69 112/68   Pulse:  87 82 79   Resp:  20  20   Temp:  98.6 °F (37 °C) 98.4 °F (36.9 °C) 98.7 °F (37.1 °C)   TempSrc:       SpO2: 95% 96% 96% 99%   Weight:       Height:           Intake/Output Summary (Last 24 hours) at 1/10/2025 0747  Last data filed at 1/9/2025 1700  Gross per 24 hour   Intake 360 ml   Output --   Net 360 ml     General: Patient is resting comfortably no acute distress  HEENT: Normocephalic atraumatic  Neck: Appears to be supple  Pulmonary: There is no increased work of breathing; there is no accessory muscle use noted  Extremities: No significant edema noted  Neurologic: No focal deficits noted.  Medications:    Current Facility-Administered Medications:     acetaminophen (TYLENOL)  "tablet 650 mg, 650 mg, Oral, Q6H PRN, Tere Fregoso PA-C    amitriptyline (ELAVIL) tablet 75 mg, 75 mg, Oral, HS, Tere Fregoso PA-C, 75 mg at 01/09/25 2217    enoxaparin (LOVENOX) subcutaneous injection 40 mg, 40 mg, Subcutaneous, Q24H, Papo Carr DO    escitalopram (LEXAPRO) tablet 20 mg, 20 mg, Oral, HS, Tere Fregoso PA-C, 20 mg at 01/09/25 2217    gabapentin (NEURONTIN) capsule 100 mg, 100 mg, Oral, TID, Tere Fregoso PA-C, 100 mg at 01/09/25 2217    ondansetron (ZOFRAN) injection 4 mg, 4 mg, Intravenous, Q6H PRN, Donte Iqbal MD, 4 mg at 01/09/25 1642    potassium chloride 40 mEq in sodium chloride 0.9 % 250 mL IVPB, 40 mEq, Intravenous, Q6H, Tere Fregoso PA-C, Last Rate: 135 mL/hr at 01/10/25 0252, 40 mEq at 01/10/25 0252    spironolactone (ALDACTONE) tablet 12.5 mg, 12.5 mg, Oral, Daily, Tere Fregoso PA-C, 12.5 mg at 01/09/25 0832    SUMAtriptan (IMITREX) subcutaneous injection 6 mg, 6 mg, Subcutaneous, Once PRN, Tere Fregoso PA-C    Laboratory Results:  Results from last 7 days   Lab Units 01/10/25  0038 01/09/25  1640 01/09/25  0832 01/09/25  0622 01/09/25  0004 01/08/25  1548 01/07/25  0712   WBC Thousand/uL  --   --   --  10.05  --  7.80  --    HEMOGLOBIN g/dL  --   --   --  12.3  --  12.4  --    HEMATOCRIT %  --   --   --  40.9  --  39.5  --    PLATELETS Thousands/uL  --   --   --  402*  --  407*  --    POTASSIUM mmol/L 2.9* 4.0 3.2*  --  3.1* 2.3* 2.8*   CHLORIDE mmol/L 105 108 107  --  106 102 98   CO2 mmol/L 20* 19* 21  --  22 22 21   BUN mg/dL 13 13 14  --  18 19 22   CREATININE mg/dL 1.12 1.15 1.03  --  1.12 1.15 1.13   CALCIUM mg/dL 8.0* 7.9* 7.7*  --  8.0* 8.4 9.2   MAGNESIUM mg/dL  --   --   --  2.4  --  2.4  --    PHOSPHORUS mg/dL  --   --   --  3.1  --   --   --        Portions of the record may have been created with voice recognition software. Occasional wrong word or \"sound a like\" " substitutions may have occurred due to the inherent limitations of voice recognition software. Read the chart carefully and recognize, using context, where substitutions have occurred.If you have any questions, please contact the dictating provider.

## 2025-01-10 NOTE — PROGRESS NOTES
Progress Note - Hospitalist   Name: Brittani Patino 39 y.o. female I MRN: [id number]  Unit/Bed#: 407-01 I Date of Admission: 1/8/2025   Date of Service: 1/10/2025 I Hospital Day: 2    Assessment & Plan  Hypokalemia  38 yo female PMHx gastric bypass, severe hypokalemia maintained on daily 80mEq IV potassium infusions presents with symptomatic hypokalemia with right hand & facial paresthesias, chest pressure, nausea & poor appetite.    Continue spironolactone   K 2.8 on outpatient labs   Potassium nowup to 4.0 then dipped to 2.9 and has now stabilized to 3.5 in the last 24 hours  Telemetry discontinued.   Nephrology consult  Suspected extrarenal potassium wasting w/ workup completed prior hospitalization. Also w/ poor absorption with prior gastric bypass  Recs IV potassium chloride 40mEq every 6 hours   Trial ensure shakes  Serial BMP   Continue potassium repletion at this time    High anion gap metabolic acidosis  AG-16 on outpatient labs. AG-8 on 01/09 labs.   Possibly starvation ketosis as reports little oral intake over past 3 days   Anion gap returned, check a VBG  History of gastric bypass  Follow with dietitian and nephrology for nutrient and electrolyte management.   Continue to monitor for any deficiencies.     Chronic migraine without aura without status migrainosus, not intractable  Currently controlled on Amitriptyline and Sumatriptan as needed.   Continue to monitor   Vitamin B12 deficiency  Maintained on B12 injections  Vitamin D insufficiency  Resume vitamin D supplement on discharge  Stage 3a chronic kidney disease (CKD) ([facility])  Lab Results   Component Value Date    EGFR 67 01/10/2025    EGFR 61 01/10/2025    EGFR 59 01/09/2025    CREATININE 1.04 01/10/2025    CREATININE 1.12 01/10/2025    CREATININE 1.15 01/09/2025     At baseline of 1.0  Nephrolithiasis      VTE Pharmacologic Prophylaxis:   Moderate Risk (Score 3-4) - Pharmacological DVT Prophylaxis Ordered: enoxaparin (Lovenox).    Mobility:   Basic  "Mobility Inpatient Raw Score: 24  JH-HLM Goal: 8: Walk 250 feet or more  JH-HLM Achieved: 8: Walk 250 feet ot more  JH-HLM Goal achieved. Continue to encourage appropriate mobility.    Patient Centered Rounds: I performed bedside rounds with nursing staff today.   Discussions with Specialists or Other Care Team Provider: RODOLFO, nephrology    Education and Discussions with Family / Patient: Patient declined call to .     Current Length of Stay: 2 day(s)  Current Patient Status: Inpatient   Certification Statement: The patient will continue to require additional inpatient hospital stay due to repleting potassium  Discharge Plan: Anticipate discharge in 24-48 hrs to home.    Code Status: Level 1 - Full Code    Subjective   Patient reports her facial and arm paresthesias returned last night but are much improved this AM. She states it is \"typical\" for her to \"drop\" her potassium levels at night and then for the potassium to steadily incline and continue to stay improved.     Objective :  Temp:  [98.4 °F (36.9 °C)-98.7 °F (37.1 °C)] 98.7 °F (37.1 °C)  HR:  [79-87] 82  BP: (111-112)/(64-69) 111/64  Resp:  [20] 20  SpO2:  [95 %-99 %] 95 %  O2 Device: None (Room air)    Body mass index is 22.46 kg/m².     Input and Output Summary (last 24 hours):     Intake/Output Summary (Last 24 hours) at 1/10/2025 1005  Last data filed at 1/10/2025 0851  Gross per 24 hour   Intake 660 ml   Output --   Net 660 ml       Physical Exam  Vitals and nursing note reviewed.   Constitutional:       General: She is not in acute distress.     Appearance: She is not ill-appearing.   Cardiovascular:      Rate and Rhythm: Normal rate and regular rhythm.      Pulses: Normal pulses.      Heart sounds: Normal heart sounds. No murmur heard.     No gallop.   Pulmonary:      Effort: Pulmonary effort is normal.      Breath sounds: Normal breath sounds. No wheezing, rhonchi or rales.   Abdominal:      General: Bowel sounds are normal.      " Palpations: Abdomen is soft.      Tenderness: There is no abdominal tenderness. There is no guarding or rebound.   Musculoskeletal:      Right lower leg: No edema.      Left lower leg: No edema.   Neurological:      Mental Status: She is alert. Mental status is at baseline.   Psychiatric:         Mood and Affect: Mood normal.         Behavior: Behavior normal.           Lines/Drains:      Central Line:  Goal for removal: N/A - Chronic PICC               Lab Results: I have reviewed the following results:   Results from last 7 days   Lab Units 01/09/25  0622   WBC Thousand/uL 10.05   HEMOGLOBIN g/dL 12.3   HEMATOCRIT % 40.9   PLATELETS Thousands/uL 402*   SEGS PCT % 76*   LYMPHO PCT % 16   MONO PCT % 6   EOS PCT % 1     Results from last 7 days   Lab Units 01/10/25  0823 01/09/25  0832 01/09/25  0622   SODIUM mmol/L 134*   < >  --    POTASSIUM mmol/L 3.5   < >  --    CHLORIDE mmol/L 107   < >  --    CO2 mmol/L 20*   < >  --    BUN mg/dL 11   < >  --    CREATININE mg/dL 1.04   < >  --    ANION GAP mmol/L 7   < >  --    CALCIUM mg/dL 8.1*   < >  --    ALBUMIN g/dL  --   --  4.2   TOTAL BILIRUBIN mg/dL  --   --  0.42   ALK PHOS U/L  --   --  54   ALT U/L  --   --  13   AST U/L  --   --  18   GLUCOSE RANDOM mg/dL 90   < >  --     < > = values in this interval not displayed.             Results from last 7 days   Lab Units 01/07/25  0712   HEMOGLOBIN A1C % 5.6     Results from last 7 days   Lab Units 01/09/25  0622   PROCALCITONIN ng/ml 0.10       Recent Cultures (last 7 days):         Imaging Results Review: No pertinent imaging studies reviewed.  Other Study Results Review: No additional pertinent studies reviewed.    Last 24 Hours Medication List:     Current Facility-Administered Medications:     acetaminophen (TYLENOL) tablet 650 mg, Q6H PRN    amitriptyline (ELAVIL) tablet 75 mg, HS    enoxaparin (LOVENOX) subcutaneous injection 40 mg, Q24H    escitalopram (LEXAPRO) tablet 20 mg, HS    gabapentin (NEURONTIN) capsule  100 mg, TID    ondansetron (ZOFRAN) injection 4 mg, Q6H PRN    potassium chloride 40 mEq in sodium chloride 0.9 % 250 mL IVPB, Q6H, Last Rate: 40 mEq (01/10/25 0825)    spironolactone (ALDACTONE) tablet 12.5 mg, Daily    SUMAtriptan (IMITREX) subcutaneous injection 6 mg, Once PRN    Administrative Statements   Today, Patient Was Seen By: Korina Acosta PA-C  I have spent a total time of 45 minutes in caring for this patient on the day of the visit/encounter including Diagnostic results, Risks and benefits of tx options, Instructions for management, Patient and family education, Importance of tx compliance, Counseling / Coordination of care, Documenting in the medical record, Reviewing / ordering tests, medicine, procedures  , Obtaining or reviewing history  , and Communicating with other healthcare professionals .    **Please Note: This note may have been constructed using a voice recognition system.**

## 2025-01-10 NOTE — ASSESSMENT & PLAN NOTE
CT renal stone study abdomen pelvis reviewed -shows bilateral nonobstructing renal collecting system stones.  While it is noted that patients with history of GI malabsorption are often at increased risk for calcium oxalate stones due to enteric hyperoxaluria it was noted the patient's prior kidney stone had been questionable phosphate stone.  Need to further clarify.

## 2025-01-10 NOTE — ASSESSMENT & PLAN NOTE
38 yo female PMHx gastric bypass, severe hypokalemia maintained on daily 80mEq IV potassium infusions presents with symptomatic hypokalemia with right hand & facial paresthesias, chest pressure, nausea & poor appetite.    Continue spironolactone   K 2.8 on outpatient labs   Potassium nowup to 4.0 then dipped to 2.9 and has now stabilized to 3.5 in the last 24 hours  Telemetry discontinued.   Nephrology consult  Suspected extrarenal potassium wasting w/ workup completed prior hospitalization. Also w/ poor absorption with prior gastric bypass  Recs IV potassium chloride 40mEq every 6 hours   Trial ensure shakes  Serial BMP   Continue potassium repletion at this time

## 2025-01-10 NOTE — PLAN OF CARE
Problem: PAIN - ADULT  Goal: Verbalizes/displays adequate comfort level or baseline comfort level  Description: Interventions:  - Encourage patient to monitor pain and request assistance  - Assess pain using appropriate pain scale  - Administer analgesics based on type and severity of pain and evaluate response  - Implement non-pharmacological measures as appropriate and evaluate response  - Consider cultural and social influences on pain and pain management  - Notify physician/advanced practitioner if interventions unsuccessful or patient reports new pain  Outcome: Progressing     Problem: INFECTION - ADULT  Goal: Absence or prevention of progression during hospitalization  Description: INTERVENTIONS:  - Assess and monitor for signs and symptoms of infection  - Monitor lab/diagnostic results  - Monitor all insertion sites, i.e. indwelling lines, tubes, and drains  - Monitor endotracheal if appropriate and nasal secretions for changes in amount and color  - [place] appropriate cooling/warming therapies per order  - Administer medications as ordered  - Instruct and encourage patient and family to use good hand hygiene technique  - Identify and instruct in appropriate isolation precautions for identified infection/condition  Outcome: Progressing  Goal: Absence of fever/infection during neutropenic period  Description: INTERVENTIONS:  - Monitor WBC    Outcome: Progressing     Problem: DISCHARGE PLANNING  Goal: Discharge to home or other facility with appropriate resources  Description: INTERVENTIONS:  - Identify barriers to discharge w/patient and caregiver  - Arrange for needed discharge resources and transportation as appropriate  - Identify discharge learning needs (meds, wound care, etc.)  - Arrange for interpretive services to assist at discharge as needed  - Refer to Case Management Department for coordinating discharge planning if the patient needs post-hospital services based on physician/advanced  practitioner order or complex needs related to functional status, cognitive ability, or social support system  Outcome: Progressing

## 2025-01-10 NOTE — ASSESSMENT & PLAN NOTE
The etiology of the patient's hypokalemia is decreased GI absorption/extrarenal losses.  The patient had a urine potassium/creatinine ratio less than 13.  The patient is currently getting IV potassium 40 mEq every 6 hours.  The goal is for the patient to increase her oral intake is much as possible to match what she had been doing as an outpatient with oral intake of nutrition prior to coming into the hospital although again the patient required 80 mill equivalents IV each day.    Would maintain current IV dosing for the next 24 hours.  The patient is liberalized on a regular diet although not sure how intact the absorption is chronically.  Certainly there was an acute aspect questionable viral or other transient illness when the patient came in that got the patient out of the delicate balance she was in between the IV potassium replacement as well as oral intake from a nutritional standpoint that she was maintaining.  Will try adding Ensure max and see how patient does with this.  Again with supplements there are several factors first would the patient be able to tolerate it from a GI perspective, how much of this would be able to be absorbed but just trying to optimize nutrition here is much as possible to try to get patient back in the prior steady state as the patient is requiring a higher amount of IV potassium than she normally does as an outpatient.  Her magnesium is stable at 2.4 from January 9 and Phos is 3.1 from January 9.  A.m. labs are pending.  Will follow-up on a.m. labs.  Recheck urine sodium, urine potassium and urine creatinine.

## 2025-01-11 VITALS
BODY MASS INDEX: 22.6 KG/M2 | DIASTOLIC BLOOD PRESSURE: 58 MMHG | HEIGHT: 62 IN | OXYGEN SATURATION: 99 % | WEIGHT: 122.8 LBS | SYSTOLIC BLOOD PRESSURE: 100 MMHG | HEART RATE: 85 BPM | TEMPERATURE: 97.8 F | RESPIRATION RATE: 18 BRPM

## 2025-01-11 LAB
ANION GAP SERPL CALCULATED.3IONS-SCNC: 6 MMOL/L (ref 4–13)
ANION GAP SERPL CALCULATED.3IONS-SCNC: 8 MMOL/L (ref 4–13)
ANION GAP SERPL CALCULATED.3IONS-SCNC: 8 MMOL/L (ref 4–13)
BUN SERPL-MCNC: 14 MG/DL (ref 5–25)
BUN SERPL-MCNC: 15 MG/DL (ref 5–25)
BUN SERPL-MCNC: 15 MG/DL (ref 5–25)
CALCIUM SERPL-MCNC: 7.8 MG/DL (ref 8.4–10.2)
CALCIUM SERPL-MCNC: 7.9 MG/DL (ref 8.4–10.2)
CALCIUM SERPL-MCNC: 8.1 MG/DL (ref 8.4–10.2)
CHLORIDE SERPL-SCNC: 109 MMOL/L (ref 96–108)
CHLORIDE SERPL-SCNC: 109 MMOL/L (ref 96–108)
CHLORIDE SERPL-SCNC: 110 MMOL/L (ref 96–108)
CO2 SERPL-SCNC: 20 MMOL/L (ref 21–32)
CO2 SERPL-SCNC: 20 MMOL/L (ref 21–32)
CO2 SERPL-SCNC: 21 MMOL/L (ref 21–32)
CREAT SERPL-MCNC: 1.08 MG/DL (ref 0.6–1.3)
CREAT SERPL-MCNC: 1.16 MG/DL (ref 0.6–1.3)
CREAT SERPL-MCNC: 1.28 MG/DL (ref 0.6–1.3)
GFR SERPL CREATININE-BSD FRML MDRD: 52 ML/MIN/1.73SQ M
GFR SERPL CREATININE-BSD FRML MDRD: 59 ML/MIN/1.73SQ M
GFR SERPL CREATININE-BSD FRML MDRD: 64 ML/MIN/1.73SQ M
GLUCOSE SERPL-MCNC: 81 MG/DL (ref 65–140)
GLUCOSE SERPL-MCNC: 84 MG/DL (ref 65–140)
GLUCOSE SERPL-MCNC: 97 MG/DL (ref 65–140)
POTASSIUM SERPL-SCNC: 3.7 MMOL/L (ref 3.5–5.3)
POTASSIUM SERPL-SCNC: 4 MMOL/L (ref 3.5–5.3)
POTASSIUM SERPL-SCNC: 4 MMOL/L (ref 3.5–5.3)
SODIUM SERPL-SCNC: 136 MMOL/L (ref 135–147)
SODIUM SERPL-SCNC: 137 MMOL/L (ref 135–147)
SODIUM SERPL-SCNC: 138 MMOL/L (ref 135–147)

## 2025-01-11 PROCEDURE — 80048 BASIC METABOLIC PNL TOTAL CA: CPT | Performed by: PHYSICIAN ASSISTANT

## 2025-01-11 PROCEDURE — 99232 SBSQ HOSP IP/OBS MODERATE 35: CPT | Performed by: INTERNAL MEDICINE

## 2025-01-11 PROCEDURE — 99239 HOSP IP/OBS DSCHRG MGMT >30: CPT | Performed by: PHYSICIAN ASSISTANT

## 2025-01-11 RX ORDER — SPIRONOLACTONE 25 MG/1
12.5 TABLET ORAL DAILY
Start: 2025-01-11

## 2025-01-11 RX ADMIN — POTASSIUM CHLORIDE 40 MEQ: 2 INJECTION, SOLUTION, CONCENTRATE INTRAVENOUS at 01:59

## 2025-01-11 RX ADMIN — GABAPENTIN 100 MG: 100 CAPSULE ORAL at 08:01

## 2025-01-11 RX ADMIN — POTASSIUM CHLORIDE 40 MEQ: 2 INJECTION, SOLUTION, CONCENTRATE INTRAVENOUS at 07:58

## 2025-01-11 NOTE — ASSESSMENT & PLAN NOTE
Lab Results   Component Value Date    EGFR 52 01/11/2025    EGFR 64 01/11/2025    EGFR 59 01/11/2025    CREATININE 1.28 01/11/2025    CREATININE 1.08 01/11/2025    CREATININE 1.16 01/11/2025     Near baseline of 1.0 - 1.1

## 2025-01-11 NOTE — DISCHARGE SUMMARY
Discharge Summary - Hospitalist   Name: Brittani Patino 39 y.o. female I MRN: [id number]  Unit/Bed#: 407-01 I Date of Admission: 1/8/2025   Date of Service: 1/11/2025 I Hospital Day: 3     Assessment & Plan  Hypokalemia  38 yo female PMHx gastric bypass, severe hypokalemia maintained on daily 80mEq IV potassium infusions presents with symptomatic hypokalemia with right hand & facial paresthesias, chest pressure, nausea & poor appetite.    Continue spironolactone   K 2.8 on outpatient labs   Potassium nowup to 4.0 then dipped to 2.9 and has now stabilized to > 3.5 in the last 24 hours  Telemetry discontinued.   Nephrology consult  Suspected extrarenal potassium wasting w/ workup completed prior hospitalization. Also w/ poor absorption with prior gastric bypass  Recs IV potassium chloride 40mEq every 6 hours > now back to her home needs   Trial ensure shakes  Serial BMP   Cleared by nephrology for discharge today    High anion gap metabolic acidosis  AG-16 on outpatient labs. AG-8 on 01/09 labs.   Possibly starvation ketosis as reports little oral intake over past 3 days   Anion gap returned, check a VBG - well compensated  History of gastric bypass  Follow with dietitian and nephrology for nutrient and electrolyte management.   Continue to monitor for any deficiencies.     Chronic migraine without aura without status migrainosus, not intractable  Currently controlled on Amitriptyline and Sumatriptan as needed.   Continue to monitor   Vitamin B12 deficiency  Maintained on B12 injections  Vitamin D insufficiency  Resume vitamin D supplement on discharge  Stage 3a chronic kidney disease (CKD) ([facility])  Lab Results   Component Value Date    EGFR 52 01/11/2025    EGFR 64 01/11/2025    EGFR 59 01/11/2025    CREATININE 1.28 01/11/2025    CREATININE 1.08 01/11/2025    CREATININE 1.16 01/11/2025     Near baseline of 1.0 - 1.1  Nephrolithiasis       Medical Problems       Resolved Problems  Date Reviewed: 11/5/2024   None        Discharging Physician / Practitioner: Korina Acosta PA-C  PCP: Clarence Ingram MD  Admission Date:   Admission Orders (From admission, onward)       Ordered        01/08/25 1438  Inpatient Admission  Once                          Discharge Date: 01/11/25    Consultations During Hospital Stay:  nephrology    Procedures Performed:   none    Significant Findings / Test Results:   No orders to display         Incidental Findings:   none    Test Results Pending at Discharge (will require follow up):   none     Outpatient Tests Requested:  BMP per nephrology    Complications:  none    Reason for Admission: hypokalemia    Hospital Course:   Brittani Patino is a 39 y.o. female patient who originally presented to the hospital on 1/8/2025 due to  symptomatic hypokalemia.  She reports onset of right hand paresthesias approximately 2 days ago.  With development of facial paresthesias and chest heaviness this morning. No associated shortness of breath.  Reports associated nausea and decreased appetite.   Notes that the symptoms are typical for her prior episodes of hypokalemia. No vomiting however does report dry heaves.  Denies diarrhea. States she has been able to eat or drink much over the past few days.  She reports low-grade fever at home with past Friday, otherwise no further fevers, chills, HA, cough, SOB, abdominal pain, urinary complaints or leg swelling.     Patient has history of severe refractory hypokalemia of unclear etiology.  Has had extensive workup by her outpatient nephrologist and additional extensive workup by both Torito and Pancho.  She is maintained on daily IV potassium infusions of 10millimoles over 8 hours as she is sleeping. Reports she has been compliant with these. Patient called her nephrologist office given her symptoms and outpatient labs with a potassium of 2.8.  They recommended direct admission to the hospital as such patient presented for further evaluation treatment.        Please see  "above list of diagnoses and related plan for additional information.     Condition at Discharge: good    Discharge Day Visit / Exam:   Subjective:  patient reports she feels well overall, has no new complaints and feels stable for dischagre  Vitals: Blood Pressure: 100/58 (01/11/25 0759)  Pulse: 85 (01/11/25 1451)  Temperature: 97.8 °F (36.6 °C) (01/11/25 0646)  Temp Source: Oral (01/11/25 1451)  Respirations: 18 (01/11/25 1451)  Height: 5' 2\" (157.5 cm) (01/09/25 0701)  Weight - Scale: 55.7 kg (122 lb 12.7 oz) (01/09/25 0701)  SpO2: 99 % (01/11/25 0759)  Physical Exam  Vitals and nursing note reviewed.   Constitutional:       General: She is not in acute distress.     Appearance: Normal appearance. She is not ill-appearing.   Cardiovascular:      Rate and Rhythm: Normal rate and regular rhythm.      Pulses: Normal pulses.      Heart sounds: Normal heart sounds.   Pulmonary:      Effort: Pulmonary effort is normal.      Breath sounds: Normal breath sounds. No wheezing, rhonchi or rales.   Abdominal:      General: Bowel sounds are normal.      Palpations: Abdomen is soft.      Tenderness: There is no abdominal tenderness. There is no guarding or rebound.   Musculoskeletal:      Right lower leg: No edema.      Left lower leg: No edema.   Skin:     General: Skin is warm and dry.   Neurological:      Mental Status: She is alert. Mental status is at baseline.   Psychiatric:         Mood and Affect: Mood normal.         Behavior: Behavior normal.          Discussion with Family: Patient declined call to .     Discharge instructions/Information to patient and family:   See after visit summary for information provided to patient and family.      Provisions for Follow-Up Care:  See after visit summary for information related to follow-up care and any pertinent home health orders.      Mobility at time of Discharge:   Basic Mobility Inpatient Raw Score: 24  JH-HLM Goal: 8: Walk 250 feet or more  JH-HLM Achieved: " 8: Walk 250 feet ot more  HLM Goal achieved. Continue to encourage appropriate mobility.     Disposition:   Home    Planned Readmission: none    Discharge Medications:  See after visit summary for reconciled discharge medications provided to patient and/or family.      Administrative Statements   Discharge Statement:  I have spent a total time of 45 minutes in caring for this patient on the day of the visit/encounter. >30 minutes of time was spent on: Diagnostic results, Prognosis, Risks and benefits of tx options, Instructions for management, Patient and family education, Importance of tx compliance, Risk factor reductions, Impressions, Counseling / Coordination of care, Documenting in the medical record, Reviewing / ordering tests, medicine, procedures  , and Communicating with other healthcare professionals .    **Please Note: This note may have been constructed using a voice recognition system**

## 2025-01-11 NOTE — NURSING NOTE
Patient was discharged in walking stable condition. IV was taken out and patients belongings were gathered. AVS was reviewed with patient and a verbal understanding was given. Patients  transported patient to her home.

## 2025-01-11 NOTE — ASSESSMENT & PLAN NOTE
Continue to encourage at least 2 L of fluid intake daily, additional workup in the outpatient setting regarding stone composition as well as potential 24-hour urine for stone risk evaluation.

## 2025-01-11 NOTE — ASSESSMENT & PLAN NOTE
38 yo female PMHx gastric bypass, severe hypokalemia maintained on daily 80mEq IV potassium infusions presents with symptomatic hypokalemia with right hand & facial paresthesias, chest pressure, nausea & poor appetite.    Continue spironolactone   K 2.8 on outpatient labs   Potassium nowup to 4.0 then dipped to 2.9 and has now stabilized to > 3.5 in the last 24 hours  Telemetry discontinued.   Nephrology consult  Suspected extrarenal potassium wasting w/ workup completed prior hospitalization. Also w/ poor absorption with prior gastric bypass  Recs IV potassium chloride 40mEq every 6 hours > now back to her home needs   Trial ensure shakes  Serial BMP   Cleared by nephrology for discharge today

## 2025-01-11 NOTE — PLAN OF CARE
Problem: Nutrition/Hydration-ADULT  Goal: Nutrient/Hydration intake appropriate for improving, restoring or maintaining nutritional needs  Description: Monitor and assess patient's nutrition/hydration status for malnutrition. Collaborate with interdisciplinary team and initiate plan and interventions as ordered.  Monitor patient's weight and dietary intake as ordered or per policy. Utilize nutrition screening tool and intervene as necessary. Determine patient's food preferences and provide high-protein, high-caloric foods as appropriate.     INTERVENTIONS:  - Monitor oral intake, urinary output, labs, and treatment plans  - Assess nutrition and hydration status and recommend course of action  - Evaluate amount of meals eaten  - Assist patient with eating if necessary   - Allow adequate time for meals  - Recommend/ encourage appropriate diets, oral nutritional supplements, and vitamin/mineral supplements  - Provide specific nutrition/hydration education as appropriate  - Include patient/family/caregiver in decisions related to nutrition  Outcome: Progressing     Problem: METABOLIC, FLUID AND ELECTROLYTES - ADULT  Goal: Electrolytes maintained within normal limits  Description: INTERVENTIONS:  - Monitor labs and assess patient for signs and symptoms of electrolyte imbalances  - Administer electrolyte replacement as ordered  - Monitor response to electrolyte replacements, including repeat lab results as appropriate  - Instruct patient on fluid and nutrition as appropriate  Outcome: Progressing

## 2025-01-11 NOTE — ASSESSMENT & PLAN NOTE
AG-16 on outpatient labs. AG-8 on 01/09 labs.   Possibly starvation ketosis as reports little oral intake over past 3 days   Anion gap returned, check a VBG - well compensated

## 2025-01-11 NOTE — ASSESSMENT & PLAN NOTE
Lab Results   Component Value Date    EGFR 64 01/11/2025    EGFR 59 01/11/2025    EGFR 59 01/10/2025    CREATININE 1.08 01/11/2025    CREATININE 1.16 01/11/2025    CREATININE 1.16 01/10/2025   Baseline creatinine between 1-1.1 mg/dL.  Continue to optimize care.

## 2025-01-11 NOTE — ASSESSMENT & PLAN NOTE
Potassium levels appear to stabilize, 40 mill equivalents of IV potassium chloride every 6 hours reduced down to typical home dose of 80 mill equivalents over the course of the day, in the hospital 40 mill equivalents every 12 hours.    It appears the patient can be discharged from the renal standpoint.  Continue with potassium supplementation intravenously in the outpatient setting as 80 mEq daily.  Patient to have repeat blood work done next week, she typically has weekly labs in the outpatient setting.

## 2025-01-11 NOTE — PROGRESS NOTES
Progress Note - Nephrology   Name: Brittani Patino 39 y.o. female I MRN: [id number]  Unit/Bed#: 407-01 I Date of Admission: 1/8/2025   Date of Service: 1/11/2025 I Hospital Day: 3     Assessment & Plan  Hypokalemia  Potassium levels appear to stabilize, 40 mill equivalents of IV potassium chloride every 6 hours reduced down to typical home dose of 80 mill equivalents over the course of the day, in the hospital 40 mill equivalents every 12 hours.    It appears the patient can be discharged from the renal standpoint.  Continue with potassium supplementation intravenously in the outpatient setting as 80 mEq daily.  Patient to have repeat blood work done next week, she typically has weekly labs in the outpatient setting.    History of gastric bypass  Patient appears to be stable, no specific increase in stool volumes at this time.  Nephrolithiasis  Continue to encourage at least 2 L of fluid intake daily, additional workup in the outpatient setting regarding stone composition as well as potential 24-hour urine for stone risk evaluation.  Vitamin D insufficiency  Continue vitamin D supplementation, follow-up in the outpatient setting.  Stage 3a chronic kidney disease (CKD) ([facility])  Lab Results   Component Value Date    EGFR 64 01/11/2025    EGFR 59 01/11/2025    EGFR 59 01/10/2025    CREATININE 1.08 01/11/2025    CREATININE 1.16 01/11/2025    CREATININE 1.16 01/10/2025   Baseline creatinine between 1-1.1 mg/dL.  Continue to optimize care.  Vitamin B12 deficiency    Chronic migraine without aura without status migrainosus, not intractable    High anion gap metabolic acidosis  Resolved        Case discussed with hospitalist.  Patient may be discharged with typical potassium regimen in the outpatient setting as dictated above.  She will have blood work done in the outpatient setting and we will follow-up on this.    Follow up reason for today's visit: Hypokalemia    Hypokalemia    Patient Active Problem List   Diagnosis     "Hypokalemia    History of gastric bypass    Migraine without aura and without status migrainosus, not intractable    Left-sided weakness    Transaminitis    Hypocalcemia    Hypophosphatemia    Acute on chronic anemia    Vitamin D insufficiency    Elevated CPK    Vitamin B12 deficiency    Cervical lymphadenopathy    Fatigue    Paresthesia of both lower extremities    Paresthesias    B12 deficiency    Gastric bypass status for obesity    GERD (gastroesophageal reflux disease)    Chronic migraine without aura without status migrainosus, not intractable    Other intestinal malabsorption    Right ovarian cyst    Chest pain    Left upper quadrant abdominal pain    Vertigo    Stress fracture of right foot with routine healing    Normal anion gap metabolic acidosis    Middle ear effusion    Abnormal LFTs    Gas pain    Acute on chronic abdominal pain    Cellulitis of chest wall    Acute pancreatitis    Open wound / skin tear of right chest wall    Other hemorrhoids    Iron deficiency    Intractable vomiting    Hyponatremia    History of anxiety    Psychogenic nonepileptic spells    Episode of shaking    Perianal abscess    Idiopathic acute pancreatitis without infection or necrosis    Left ureteral calculus    Leukocytosis    Enterocolitis    Intussusception (HCC)    Essential hypertension    History of intussusception    Nephrolithiasis    Iron deficiency anemia due to dietary causes    Magnesium deficiency    High anion gap metabolic acidosis    Stage 3a chronic kidney disease (CKD) (HCC)         Subjective:   No acute issues at this time, patient is eating and drinking well with no complaints.    Objective:     Vitals: Blood pressure 100/58, pulse 84, temperature 97.8 °F (36.6 °C), resp. rate 16, height 5' 2\" (1.575 m), weight 55.7 kg (122 lb 12.7 oz), SpO2 99%.,Body mass index is 22.46 kg/m².    Weight (last 2 days)       Date/Time Weight    01/09/25 07:01:19 55.7 (122.8)              Intake/Output Summary (Last 24 " "hours) at 1/11/2025 1143  Last data filed at 1/10/2025 2201  Gross per 24 hour   Intake 653 ml   Output --   Net 653 ml     I/O last 3 completed shifts:  In: 953 [P.O.:953]  Out: -          Physical Exam: /58   Pulse 84   Temp 97.8 °F (36.6 °C)   Resp 16   Ht 5' 2\" (1.575 m)   Wt 55.7 kg (122 lb 12.7 oz)   LMP  (LMP Unknown)   SpO2 99%   BMI 22.46 kg/m²     General Appearance:    Alert, cooperative, no distress, appears stated age   Head:    Normocephalic, without obvious abnormality, atraumatic   Eyes:    Conjunctiva/corneas clear   Ears:    Normal external ears   Nose:   Nares normal, septum midline, mucosa normal, no drainage    or sinus tenderness   Throat:   Lips, mucosa, and tongue normal; teeth and gums normal   Neck:   Supple   Back:     Symmetric, no curvature, ROM normal, no CVA tenderness   Lungs:     Clear to auscultation bilaterally, respirations unlabored   Chest wall:    No tenderness or deformity   Heart:    Regular rate and rhythm, S1 and S2 normal, no murmur, rub   or gallop   Abdomen:     Soft, non-tender, bowel sounds active   Extremities:   Extremities normal, atraumatic, no cyanosis or edema   Skin:   Skin color, texture, turgor normal, no rashes or lesions   Lymph nodes:   Cervical normal   Neurologic:   CNII-XII intact            Lab, Imaging and other studies: I have personally reviewed pertinent labs.  CBC: No results found for: \"WBC\", \"HGB\", \"HCT\", \"MCV\", \"PLT\", \"ADJUSTEDWBC\", \"RBC\", \"MCH\", \"MCHC\", \"RDW\", \"MPV\", \"NRBC\"  CMP:   Lab Results   Component Value Date    K 4.0 01/11/2025     (H) 01/11/2025    CO2 21 01/11/2025    BUN 14 01/11/2025    CREATININE 1.08 01/11/2025    CALCIUM 8.1 (L) 01/11/2025    EGFR 64 01/11/2025       .  Results from last 7 days   Lab Units 01/11/25  0824 01/11/25  0014 01/10/25  1818 01/09/25  0832 01/09/25  0622   POTASSIUM mmol/L 4.0 3.7 3.7   < >  --    CHLORIDE mmol/L 109* 109* 107   < >  --    CO2 mmol/L 21 20* 20*   < >  --    BUN mg/dL " "14 15 13   < >  --    CREATININE mg/dL 1.08 1.16 1.16   < >  --    CALCIUM mg/dL 8.1* 7.9* 8.2*   < >  --    ALK PHOS U/L  --   --   --   --  54   ALT U/L  --   --   --   --  13   AST U/L  --   --   --   --  18    < > = values in this interval not displayed.         Phosphorus: No results found for: \"PHOS\"  Magnesium: No results found for: \"MG\"  Urinalysis: No results found for: \"COLORU\", \"CLARITYU\", \"SPECGRAV\", \"PHUR\", \"LEUKOCYTESUR\", \"NITRITE\", \"PROTEINUA\", \"GLUCOSEU\", \"KETONESU\", \"BILIRUBINUR\", \"BLOODU\"  Ionized Calcium: No results found for: \"CAION\"  Coagulation: No results found for: \"PT\", \"INR\", \"APTT\"  Troponin: No results found for: \"TROPONINI\"  ABG: No results found for: \"PHART\", \"QKA3COH\", \"PO2ART\", \"RKN6MGN\", \"Y3IFCQGB\", \"BEART\", \"SOURCE\"  Radiology review:     IMAGING  Procedure: Echo complete w/ contrast if indicated  Result Date: 1/9/2025  Narrative:   Left Ventricle: Left ventricular cavity size is normal. Wall thickness is normal. The left ventricular ejection fraction is 65%. Systolic function is normal. Wall motion is normal. Diastolic function is normal for age.   Right Ventricle: Right ventricular cavity size is normal. Systolic function is normal.   Study Details: Study quality was poor. This was a technically difficult study         Current Facility-Administered Medications:     acetaminophen (TYLENOL) tablet 650 mg, Q6H PRN    amitriptyline (ELAVIL) tablet 75 mg, HS    enoxaparin (LOVENOX) subcutaneous injection 40 mg, Q24H    ergocalciferol (VITAMIN D2) capsule 50,000 Units, Once per day on Monday Thursday    escitalopram (LEXAPRO) tablet 20 mg, HS    gabapentin (NEURONTIN) capsule 100 mg, TID    ondansetron (ZOFRAN) injection 4 mg, Q6H PRN    potassium chloride 40 mEq in sodium chloride 0.9 % 250 mL IVPB, Q12H    spironolactone (ALDACTONE) tablet 12.5 mg, Daily    SUMAtriptan (IMITREX) subcutaneous injection 6 mg, Once PRN  Medications Discontinued During This Encounter   Medication Reason "    celecoxib (CeleBREX) 200 mg capsule Therapy completed    polyethylene glycol (MIRALAX) 17 g packet Therapy completed    spironolactone (ALDACTONE) 25 mg tablet Therapy completed    vitamin A 3 MG (25596 UT) capsule Therapy completed    vitamin k 100 MCG tablet Therapy completed    potassium chloride 40 mEq in sodium chloride 0.9 % 100 mL IVPB     potassium chloride 20 mEq IVPB (premix)     enoxaparin (LOVENOX) subcutaneous injection 40 mg     potassium chloride 40 mEq in sodium chloride 0.9 % 250 mL IVPB        Juan Carlos Ricketts,       This progress note was produced in part using a dictation device which may document imprecise wording from author's original intent.

## 2025-01-12 NOTE — CASE MANAGEMENT
Case Management Discharge Planning Note    Patient name Brittani Patino  Location /407-01 MRN [id number]  : 1985 Date 2025       Current Admission Date: 2025  Current Admission Diagnosis:Hypokalemia   Patient Active Problem List    Diagnosis Date Noted Date Diagnosed    High anion gap metabolic acidosis 2025     Stage 3a chronic kidney disease (CKD) (HCC) 2025     Magnesium deficiency 2024     Iron deficiency anemia due to dietary causes 2023     Nephrolithiasis 2023     Enterocolitis 2023     Intussusception (HCC) 2023     Leukocytosis 2023     Left ureteral calculus 2023     History of intussusception 10/20/2022 06/10/2023    Idiopathic acute pancreatitis without infection or necrosis 2022     Perianal abscess 2022     Episode of shaking      Psychogenic nonepileptic spells 2022     History of anxiety 2022     Intractable vomiting 2022     Hyponatremia 2022     Iron deficiency 2021     Other hemorrhoids      Open wound / skin tear of right chest wall 2021     Cellulitis of chest wall 2021     Acute pancreatitis 2021     Acute on chronic abdominal pain 2021     Abnormal LFTs 2021     Gas pain 2021     Middle ear effusion 2020     Normal anion gap metabolic acidosis 2020     Left upper quadrant abdominal pain 2019     Vertigo 2019     Stress fracture of right foot with routine healing 2019     Right ovarian cyst 2019     Chest pain 2019     Other intestinal malabsorption 10/05/2018     Gastric bypass status for obesity 10/02/2018     GERD (gastroesophageal reflux disease) 2018     Paresthesia of both lower extremities 08/15/2018     Paresthesias 08/15/2018     Fatigue 2018     Elevated CPK 2018     Vitamin B12 deficiency 2018     Cervical lymphadenopathy 2018     Acute on chronic anemia  07/10/2018     Vitamin D insufficiency 07/10/2018     Hypocalcemia 07/09/2018     Hypophosphatemia 07/09/2018     Hypokalemia 07/07/2018     History of gastric bypass 07/07/2018     Migraine without aura and without status migrainosus, not intractable 07/07/2018     Left-sided weakness 07/07/2018     Transaminitis 07/07/2018     B12 deficiency 01/17/2016     Essential hypertension 10/14/2014 06/10/2023    Chronic migraine without aura without status migrainosus, not intractable 05/29/2014       LOS (days): 3  Geometric Mean LOS (GMLOS) (days):   Days to GMLOS:     OBJECTIVE:  Risk of Unplanned Readmission Score: 14.87         Current admission status: Inpatient   Preferred Pharmacy:   [facility] [address] [place], [place] [id number]  Phone: [phone] Fax: [phone]    [facility]/pharmacy [id number] [id number]  Phone: [phone] Fax: [phone]    Primary Care Provider: Clarence Ingram MD    Primary Insurance: Sealed  Secondary Insurance:     DISCHARGE DETAILS:  Late entry:Pt was dc'd to home on 1/11/25 and will be following up with her PCP as well as Endo as an OP.

## 2025-01-14 NOTE — UTILIZATION REVIEW
NOTIFICATION OF ADMISSION DISCHARGE   This is a Notification of Discharge from [facility]. Please be advised that this patient has been discharge from our facility. Below you will find the admission and discharge date and time including the patient’s disposition.   UTILIZATION REVIEW CONTACT:  Juanjose White  Utilization   Network Utilization Review Department  Phone: [phone] x carefully listen to the prompts. All voicemails are confidential.  Email: [email or web address]     ADMISSION INFORMATION  PRESENTATION DATE: 1/8/2025  2:04 PM  OBERVATION ADMISSION DATE: N/A  INPATIENT ADMISSION DATE: 1/8/25  2:04 PM   DISCHARGE DATE: 1/11/2025  3:24 PM   DISPOSITION:Home/Self Care    Network Utilization Review Department  ATTENTION: Please call with any questions or concerns to [phone] and carefully listen to the prompts so that you are directed to the right person. All voicemails are confidential.   For Discharge needs, contact Care Management DC Support Team at [phone] opt. 2  Send all requests for admission clinical reviews, approved or denied determinations and any other requests to dedicated fax number below belonging to the campus where the patient is receiving treatment. List of dedicated fax numbers for the Facilities:  FACILITY NAME UR FAX NUMBER   ADMISSION DENIALS (Administrative/Medical Necessity) [phone]   DISCHARGE SUPPORT TEAM ([facility]) [phone]   PARENT CHILD HEALTH (Maternity/NICU/Pediatrics) [phone]   [facility] [phone]   [facility] [phone]   [facility] [phone]   [facility] [phone]   [facility] [phone]   [facility] [phone]   [facility] [phone]   [facility] [phone]   [facility]  [facility] [phone]   [facility] [phone]   [facility] [phone]   [facility] [phone]

## 2025-01-15 ENCOUNTER — APPOINTMENT (OUTPATIENT)
Dept: LAB | Facility: CLINIC | Age: 40
End: 2025-01-15
Payer: COMMERCIAL

## 2025-01-15 DIAGNOSIS — E87.6 HYPOKALEMIA: ICD-10-CM

## 2025-01-15 LAB
ANION GAP SERPL CALCULATED.3IONS-SCNC: 10 MMOL/L (ref 4–13)
BUN SERPL-MCNC: 12 MG/DL (ref 5–25)
CALCIUM SERPL-MCNC: 8.4 MG/DL (ref 8.4–10.2)
CHLORIDE SERPL-SCNC: 108 MMOL/L (ref 96–108)
CO2 SERPL-SCNC: 20 MMOL/L (ref 21–32)
CREAT SERPL-MCNC: 1.01 MG/DL (ref 0.6–1.3)
GFR SERPL CREATININE-BSD FRML MDRD: 69 ML/MIN/1.73SQ M
GLUCOSE SERPL-MCNC: 82 MG/DL (ref 65–140)
POTASSIUM SERPL-SCNC: 3.6 MMOL/L (ref 3.5–5.3)
SODIUM SERPL-SCNC: 138 MMOL/L (ref 135–147)

## 2025-01-15 PROCEDURE — 80048 BASIC METABOLIC PNL TOTAL CA: CPT

## 2025-01-15 PROCEDURE — 36415 COLL VENOUS BLD VENIPUNCTURE: CPT

## 2025-01-21 ENCOUNTER — TELEPHONE (OUTPATIENT)
Dept: NEUROLOGY | Facility: CLINIC | Age: 40
End: 2025-01-21

## 2025-01-23 ENCOUNTER — TELEPHONE (OUTPATIENT)
Age: 40
End: 2025-01-23

## 2025-01-23 NOTE — TELEPHONE ENCOUNTER
Phone call from Singh at [facility] [phone] to inform office that they are no longer in network with patient's insurance for 2025. Would like assistance in finding an in network provider for infusion for patient.     Singh can be reached at phone number above.

## 2025-01-27 NOTE — TELEPHONE ENCOUNTER
"Singh from [facility] called back. Reviewed that pt sent a mychart message stating \"We have Yayo and they told me that [facility] was in my network.\" Singh will call the insurance and follow back up with us on status  "

## 2025-01-27 NOTE — TELEPHONE ENCOUNTER
Singh from [facility] states he checked with the  benefits department and patient is out of network.

## 2025-01-28 ENCOUNTER — TELEPHONE (OUTPATIENT)
Age: 40
End: 2025-01-28

## 2025-01-28 NOTE — TELEPHONE ENCOUNTER
Jory greenfield [facility] she had a visit with patient today. [facility] patient just returned from [place] and she has a pruritic rash on neck and states she is itchy all over. [facility] she did take Benadryl and it helped. Asked if PCP was made aware and Kelly ruiz we were listed as PCP. Gave PCP information listed and Jory will call office. Thank you.

## 2025-01-28 NOTE — TELEPHONE ENCOUNTER
Marissa from [facility] called     Reporting:    Reporting rash interior neck, and overall itching all over body. Patient just came back from [place].    Patient did take Benadryl. Patient has been educated with instructions if she gets worse  and throat closes she is to call 911.    She stated wanted pcp to know what is going on with patient.    Please advise Dr. Ingram Thank you.

## 2025-01-29 ENCOUNTER — APPOINTMENT (OUTPATIENT)
Dept: LAB | Facility: CLINIC | Age: 40
End: 2025-01-29
Payer: COMMERCIAL

## 2025-01-29 DIAGNOSIS — E87.6 HYPOKALEMIA: ICD-10-CM

## 2025-01-29 LAB
ANION GAP SERPL CALCULATED.3IONS-SCNC: 11 MMOL/L (ref 4–13)
BUN SERPL-MCNC: 9 MG/DL (ref 5–25)
CALCIUM SERPL-MCNC: 8.4 MG/DL (ref 8.4–10.2)
CHLORIDE SERPL-SCNC: 108 MMOL/L (ref 96–108)
CO2 SERPL-SCNC: 19 MMOL/L (ref 21–32)
CREAT SERPL-MCNC: 1.04 MG/DL (ref 0.6–1.3)
GFR SERPL CREATININE-BSD FRML MDRD: 67 ML/MIN/1.73SQ M
GLUCOSE SERPL-MCNC: 72 MG/DL (ref 65–140)
POTASSIUM SERPL-SCNC: 3.5 MMOL/L (ref 3.5–5.3)
SODIUM SERPL-SCNC: 138 MMOL/L (ref 135–147)

## 2025-01-29 PROCEDURE — 36415 COLL VENOUS BLD VENIPUNCTURE: CPT

## 2025-01-29 PROCEDURE — 80048 BASIC METABOLIC PNL TOTAL CA: CPT

## 2025-02-04 ENCOUNTER — APPOINTMENT (OUTPATIENT)
Dept: LAB | Facility: CLINIC | Age: 40
End: 2025-02-04
Payer: COMMERCIAL

## 2025-02-04 DIAGNOSIS — E87.6 HYPOKALEMIA: ICD-10-CM

## 2025-02-04 LAB
ANION GAP SERPL CALCULATED.3IONS-SCNC: 9 MMOL/L (ref 4–13)
BUN SERPL-MCNC: 15 MG/DL (ref 5–25)
CALCIUM SERPL-MCNC: 8.5 MG/DL (ref 8.4–10.2)
CHLORIDE SERPL-SCNC: 108 MMOL/L (ref 96–108)
CO2 SERPL-SCNC: 21 MMOL/L (ref 21–32)
CREAT SERPL-MCNC: 1.04 MG/DL (ref 0.6–1.3)
GFR SERPL CREATININE-BSD FRML MDRD: 67 ML/MIN/1.73SQ M
GLUCOSE SERPL-MCNC: 80 MG/DL (ref 65–140)
POTASSIUM SERPL-SCNC: 4 MMOL/L (ref 3.5–5.3)
SODIUM SERPL-SCNC: 138 MMOL/L (ref 135–147)

## 2025-02-04 PROCEDURE — 36415 COLL VENOUS BLD VENIPUNCTURE: CPT

## 2025-02-04 PROCEDURE — 80048 BASIC METABOLIC PNL TOTAL CA: CPT

## 2025-02-10 ENCOUNTER — APPOINTMENT (OUTPATIENT)
Dept: LAB | Facility: CLINIC | Age: 40
End: 2025-02-10
Payer: COMMERCIAL

## 2025-02-10 DIAGNOSIS — E87.6 HYPOKALEMIA: ICD-10-CM

## 2025-02-10 PROCEDURE — 80048 BASIC METABOLIC PNL TOTAL CA: CPT

## 2025-02-10 PROCEDURE — 36415 COLL VENOUS BLD VENIPUNCTURE: CPT

## 2025-02-11 LAB
ANION GAP SERPL CALCULATED.3IONS-SCNC: 10 MMOL/L (ref 4–13)
BUN SERPL-MCNC: 11 MG/DL (ref 5–25)
CALCIUM SERPL-MCNC: 8.9 MG/DL (ref 8.4–10.2)
CHLORIDE SERPL-SCNC: 107 MMOL/L (ref 96–108)
CO2 SERPL-SCNC: 21 MMOL/L (ref 21–32)
CREAT SERPL-MCNC: 1 MG/DL (ref 0.6–1.3)
GFR SERPL CREATININE-BSD FRML MDRD: 70 ML/MIN/1.73SQ M
GLUCOSE SERPL-MCNC: 86 MG/DL (ref 65–140)
POTASSIUM SERPL-SCNC: 4.1 MMOL/L (ref 3.5–5.3)
SODIUM SERPL-SCNC: 138 MMOL/L (ref 135–147)

## 2025-02-14 ENCOUNTER — TELEPHONE (OUTPATIENT)
Age: 40
End: 2025-02-14

## 2025-02-14 ENCOUNTER — NURSE TRIAGE (OUTPATIENT)
Age: 40
End: 2025-02-14

## 2025-02-14 ENCOUNTER — PATIENT MESSAGE (OUTPATIENT)
Age: 40
End: 2025-02-14

## 2025-02-14 DIAGNOSIS — G43.019 INTRACTABLE MIGRAINE WITHOUT AURA AND WITHOUT STATUS MIGRAINOSUS: Primary | ICD-10-CM

## 2025-02-14 RX ORDER — DEXAMETHASONE 4 MG/1
TABLET ORAL
Qty: 5 TABLET | Refills: 0 | Status: SHIPPED | OUTPATIENT
Start: 2025-02-14

## 2025-02-14 RX ORDER — DEXAMETHASONE 4 MG/1
TABLET ORAL
Qty: 5 TABLET | Refills: 0 | Status: SHIPPED | OUTPATIENT
Start: 2025-02-14 | End: 2025-02-14 | Stop reason: SDUPTHER

## 2025-02-14 NOTE — TELEPHONE ENCOUNTER
Outbound call placed to patient.   Pt c/o current migraine that is a contact throbbing/pain across forehead that started on Monday.     Pt reported administering Sumatriptan injections the past 4 days. Stated that the medication provided relief but it only lasted for a few hours. Advised pt not to take another dose of the Sumatriptan since it only should be given up to 3 times in a week.    Advised pt to take OTC Tylenol 1,000 mg, drink plenty of fluids, take prescribed migraine meds as directed, alternate between applying heat and/or ice, and get plenty of rest.     Pt  does not currently have the Emgality injections but would like to start taking the injections again. Reported that the injections helped prevent migraines for pt. Stated that the medication needs a PA and the team will work on getting the PA done for pt.     Pt is agreeable to trying either Dexamethasone, Olanzepine, or another medication to help break current migraine cycle. Please send new Rx for Zofran.     Char: please review and advise. Thank you       Reason for Disposition   Similar to previously diagnosed migraine headaches    Answer Assessment - Initial Assessment Questions      Protocols used: Headache-Adult-OH    When did migraine start Since Monday 2/10/25    Location/Description: throbbing pain, bilateral in the frontal area    Associated symptoms:nausea, vertigo    Precipitating factors: unsure. Stated had a cold with a cough, sore throat, and nasal congestion last week. Denies and current cold symptoms, earaches, or other URI     Alleviating factors: prescription medication heat to back of neck, Sumatriptan injections works for a little but then wears off, laying in cool cassie room  napping  meclizine    What medications have you tried for this migraine headache cold packs, lying in a darkened room, prescription medications: Sumatriptan injection (Imitrex), Zolmitriptan (Zomig), and Amitriptyline, sleep, and heating pad     Current  migraine medications are confirmed as:  --Amitriptyline 75 mg HS  --Sumatriptan Injections  --Meclizine 12.5 mg     Medications tried in the past?   Pt reported never tried any meds in the past.

## 2025-02-14 NOTE — TELEPHONE ENCOUNTER
Outbound call placed to patient.     Reviewed message below with patient. Pt verbalized understanding and thankful fro the call back.     Pt requesting that the Dexamethasone be sent to [facility]. Pt informed Bisi that she will not be getting the medication from them.       Char: Dexamethasone pended. Please sign if agreeable.

## 2025-02-14 NOTE — TELEPHONE ENCOUNTER
Patient called in about prescription dexamethasone (DECADRON)     The script was sent to the wrong pharmacy  It needs to be sent to [facility] in [place]     Confirmed new pharmacy address and added to chart.     Thank you

## 2025-02-14 NOTE — TELEPHONE ENCOUNTER
"Sent decadron taper to be taken in the morning  Warn of possible side effects such as jitteriness, anxiety, insomnia, palpitations, increased urination etc.  She has a listed \"allergy\" to prednisone but looks like it was more side effects of nausea, vomiting, diarrhea. There could be a cross sensitivity so should monitor for symptoms and discontinue if she begins to have any issues.     "

## 2025-02-19 NOTE — TELEPHONE ENCOUNTER
Pt called in and wanted to request a refill of the following medication.     dexamethasone (DECADRON) 4 mg tablet [[id number]]    Order Details    Dose, Route, Frequency: As Directed   Dispense Quantity: 5 tablet Refills: 0    Pt is requesting a call back with updates for this request.  She said she went to the pharmacy and they told her that the prescription was never sent.     Please call pt back and assist.     Pt gave me a Phone #  [phone] for the Pharmacy Listed below.     [facility]pharm[facility] #[id number] - [place], [place]  [facility] [address] [address]

## 2025-02-19 NOTE — TELEPHONE ENCOUNTER
"Per chart review, script was sent as \"phone in\" therefore, script never made it to the pharmacy.    Spoke with [facility] pharmacist. Placed verbal order. They will get medication ready for dispense.    Spoke with pt and advised her of same. Pt appreciative. Nothing further at this time.  "

## 2025-02-26 ENCOUNTER — APPOINTMENT (OUTPATIENT)
Dept: LAB | Facility: CLINIC | Age: 40
End: 2025-02-26
Payer: COMMERCIAL

## 2025-02-26 DIAGNOSIS — E87.6 HYPOKALEMIA: ICD-10-CM

## 2025-02-26 LAB
ANION GAP SERPL CALCULATED.3IONS-SCNC: 7 MMOL/L (ref 4–13)
BUN SERPL-MCNC: 14 MG/DL (ref 5–25)
CALCIUM SERPL-MCNC: 8.2 MG/DL (ref 8.4–10.2)
CHLORIDE SERPL-SCNC: 108 MMOL/L (ref 96–108)
CO2 SERPL-SCNC: 22 MMOL/L (ref 21–32)
CREAT SERPL-MCNC: 1.06 MG/DL (ref 0.6–1.3)
GFR SERPL CREATININE-BSD FRML MDRD: 65 ML/MIN/1.73SQ M
GLUCOSE SERPL-MCNC: 88 MG/DL (ref 65–140)
POTASSIUM SERPL-SCNC: 4.2 MMOL/L (ref 3.5–5.3)
SODIUM SERPL-SCNC: 137 MMOL/L (ref 135–147)

## 2025-02-26 PROCEDURE — 36415 COLL VENOUS BLD VENIPUNCTURE: CPT

## 2025-02-26 PROCEDURE — 80048 BASIC METABOLIC PNL TOTAL CA: CPT

## 2025-03-10 ENCOUNTER — APPOINTMENT (OUTPATIENT)
Dept: LAB | Facility: CLINIC | Age: 40
End: 2025-03-10
Payer: COMMERCIAL

## 2025-03-10 DIAGNOSIS — E87.6 HYPOKALEMIA: ICD-10-CM

## 2025-03-10 LAB
ANION GAP SERPL CALCULATED.3IONS-SCNC: 9 MMOL/L (ref 4–13)
BUN SERPL-MCNC: 14 MG/DL (ref 5–25)
CALCIUM SERPL-MCNC: 8.4 MG/DL (ref 8.4–10.2)
CHLORIDE SERPL-SCNC: 108 MMOL/L (ref 96–108)
CO2 SERPL-SCNC: 20 MMOL/L (ref 21–32)
CREAT SERPL-MCNC: 1.06 MG/DL (ref 0.6–1.3)
GFR SERPL CREATININE-BSD FRML MDRD: 65 ML/MIN/1.73SQ M
GLUCOSE SERPL-MCNC: 96 MG/DL (ref 65–140)
POTASSIUM SERPL-SCNC: 3.8 MMOL/L (ref 3.5–5.3)
SODIUM SERPL-SCNC: 137 MMOL/L (ref 135–147)

## 2025-03-10 PROCEDURE — 36415 COLL VENOUS BLD VENIPUNCTURE: CPT

## 2025-03-10 PROCEDURE — 80048 BASIC METABOLIC PNL TOTAL CA: CPT

## 2025-03-11 ENCOUNTER — HOSPITAL ENCOUNTER (EMERGENCY)
Facility: HOSPITAL | Age: 40
Discharge: HOME | End: 2025-03-11
Attending: EMERGENCY MEDICINE | Admitting: EMERGENCY MEDICINE
Payer: COMMERCIAL

## 2025-03-11 ENCOUNTER — APPOINTMENT (EMERGENCY)
Dept: RADIOLOGY | Facility: HOSPITAL | Age: 40
End: 2025-03-11
Payer: COMMERCIAL

## 2025-03-11 ENCOUNTER — OFFICE VISIT (OUTPATIENT)
Age: 40
End: 2025-03-11
Payer: COMMERCIAL

## 2025-03-11 VITALS
HEART RATE: 77 BPM | DIASTOLIC BLOOD PRESSURE: 72 MMHG | SYSTOLIC BLOOD PRESSURE: 106 MMHG | OXYGEN SATURATION: 98 % | BODY MASS INDEX: 24 KG/M2 | WEIGHT: 130.4 LBS | HEIGHT: 62 IN

## 2025-03-11 VITALS
DIASTOLIC BLOOD PRESSURE: 72 MMHG | HEART RATE: 95 BPM | SYSTOLIC BLOOD PRESSURE: 123 MMHG | OXYGEN SATURATION: 99 % | RESPIRATION RATE: 18 BRPM | TEMPERATURE: 97.5 F

## 2025-03-11 DIAGNOSIS — G43.709 CHRONIC MIGRAINE WITHOUT AURA WITHOUT STATUS MIGRAINOSUS, NOT INTRACTABLE: Primary | ICD-10-CM

## 2025-03-11 DIAGNOSIS — S97.82XA CRUSH INJURY OF LEFT FOOT: Primary | ICD-10-CM

## 2025-03-11 PROCEDURE — 99214 OFFICE O/P EST MOD 30 MIN: CPT

## 2025-03-11 PROCEDURE — 73630 X-RAY EXAM OF FOOT: CPT

## 2025-03-11 PROCEDURE — 99284 EMERGENCY DEPT VISIT MOD MDM: CPT | Performed by: EMERGENCY MEDICINE

## 2025-03-11 PROCEDURE — 99283 EMERGENCY DEPT VISIT LOW MDM: CPT

## 2025-03-11 RX ORDER — OXYCODONE HYDROCHLORIDE 5 MG/1
5 TABLET ORAL EVERY 8 HOURS PRN
Qty: 7 TABLET | Refills: 0 | Status: SHIPPED | OUTPATIENT
Start: 2025-03-11

## 2025-03-11 RX ORDER — OXYCODONE HYDROCHLORIDE 5 MG/1
5 TABLET ORAL ONCE
Refills: 0 | Status: COMPLETED | OUTPATIENT
Start: 2025-03-11 | End: 2025-03-11

## 2025-03-11 RX ADMIN — OXYCODONE HYDROCHLORIDE 5 MG: 5 TABLET ORAL at 17:06

## 2025-03-11 NOTE — PATIENT INSTRUCTIONS
"Headache/migraine treatment:   - When you have a moderate to severe headache, you should seek rest, initiate relaxation and apply cold compresses to the head.      Abortive medications (for immediate treatment of a headache):   It is ok to take ibuprofen, acetaminophen or naproxen (Advil, Tylenol,  Aleve, Excedrin) if they help your headaches you should limit these to no more than 3 times a week to avoid medication overuse/rebound headaches.      Take at the onset of a migraine use Sumatriptan SC 6 mg +/- zofran 4 mg +/- Tylenol 1000 mg  You may repeat sumatriptan 6 mg in 1 hour if needed. Max 12 mg/day, Max 3 days per week.     Over the counter preventive supplements for headaches/migraines   (to take every day to help prevent headaches - not to take at the time of headache):  [x] Magnesium 500 mg 1-2 times daily (If any diarrhea or upset stomach, decrease dose  as tolerated)  [x] Riboflavin (Vitamin B2) 400mg daily (FYI B2 may make your urine bright/neon yellow)     Prescription preventive medications for headaches/migraines   (to take every day to help prevent headaches - not to take at the time of headache):     Continue Amitriptyline 75 mg daily.  Continue [id number] mg (1 injection) every 30 days thereafter      *Typically these types of medications take time untill you see the benefit, although some may see improvement in days, often it may take weeks, especially if the medication is being titrated up to a beneficial level. Please contact us if there are any concerns or questions regarding the medication.      Self-Monitoring:  [x] Headache calendar. Each day donna a number from 0-10 indicating if there was a headache and how bad it was.  This can be used to monitor gradual improvement and is helpful to make medication adjustments. You can do this on paper or there is an ANNY for a smart phone called \"Migraine e Diary\".      Lifestyle Recommendations:  [x] SLEEP - Maintain a regular sleep schedule: Adults need " at least 7-8 hours of uninterrupted a night. Maintain good sleep hygiene:  Going to bed and waking up at consistent times, avoiding excessive daytime naps, avoiding caffeinated beverages in the evening, avoid excessive stimulation in the evening and generally using bed primarily for sleeping.  One hour before bedtime would recommend turning lights down lower, decreasing your activity (may read quietly, listen to music at a low volume). When you get into bed, should eliminate all technology (no texting, emailing, playing with your phone, iPad or tablet in bed).  [x] HYDRATION - Maintain good hydration.  Drink  2L of fluid a day (4 typical small water bottles)  [x] DIET - Maintain good nutrition. In particular don't skip meals and try and eat healthy balanced meals regularly.  [x] TRIGGERS - Look for other triggers and avoid them: Limit caffeine to 1-2 cups a day or less. Avoid dietary triggers that you have noticed bring on your headaches (this could include aged cheese, peanuts, MSG, aspartame and nitrates).  [x] EXERCISE - physical exercise as we all know is good for you in many ways, and not only is good for your heart, but also is beneficial for your mental health, cognitive health and  chronic pain/headaches. I would encourage at the least 5 days of physical exercise weekly for at least 30 minutes.      Education and Follow-up  [x] Please call with any questions or concerns. Of course if any new concerning symptoms go to the emergency department.  [x] Follow up in 4 months, sooner if needed

## 2025-03-11 NOTE — ED PROVIDER NOTES
Time reflects when diagnosis was documented in both MDM as applicable and the Disposition within this note       Time User Action Codes Description Comment    3/11/2025  5:41 PM Melissa Rangel Add [S97.82XA] Crush injury of left foot           ED Disposition       ED Disposition   Discharge    Condition   Stable    Date/Time   Tue Mar 11, 2025  5:41 PM    Comment   Brittani Patino discharge to home/self care.                   Assessment & Plan       Medical Decision Making  40-year-old female presents for evaluation after foot injury.  Will obtain x-ray to assess for fracture.    Amount and/or Complexity of Data Reviewed  Radiology: ordered. Decision-making details documented in ED Course.    Risk  Prescription drug management.        ED Course as of 03/11/25 2240   Tue Mar 11, 2025   1725 XR foot 3+ views LEFT  On my interpretation, no acute osseous abnormality   1743 Pt with contra-indication to prolonged nsaid use (gastric bypass), will provide few doses of narcotic pain medication to use PRN       Medications   oxyCODONE (ROXICODONE) IR tablet 5 mg (5 mg Oral Given 3/11/25 1706)       ED Risk Strat Scores                            SBIRT 20yo+      Flowsheet Row Most Recent Value   Initial Alcohol Screen: US AUDIT-C     1. How often do you have a drink containing alcohol? 0 Filed at: 03/11/2025 1641   2. How many drinks containing alcohol do you have on a typical day you are drinking?  0 Filed at: 03/11/2025 1641   3a. Male UNDER 65: How often do you have five or more drinks on one occasion? 0 Filed at: 03/11/2025 1641   3b. FEMALE Any Age, or MALE 65+: How often do you have 4 or more drinks on one occassion? 0 Filed at: 03/11/2025 1641   Audit-C Score 0 Filed at: 03/11/2025 1641   SLIME: How many times in the past year have you...    Used an illegal drug or used a prescription medication for non-medical reasons? Never Filed at: 03/11/2025 1641                            History of Present Illness       Chief  "Complaint   Patient presents with    Foot Injury     Patient dropped a gallon jug of water on her left foot around 1330 today. Motrin and tylenol @ 1330       Past Medical History:   Diagnosis Date    C. difficile colitis 2016    COVID-19     2022- pt denies long covid    DUB (dysfunctional uterine bleeding)     H. pylori infection     Hypertension     Hypokalemia     Kidney stone 2023    Left lower quadrant abdominal pain 2020    Migraine     WAGNER (obstructive sleep apnea) 2014    Pancreatitis     Psychiatric disorder     Anxiety    Rectal bleeding     Renal disorder     Rhabdomyolysis     Sleep apnea     resolved with bariatric surgery    Thrombosed external hemorrhoid       Past Surgical History:   Procedure Laterality Date    ABDOMINAL SURGERY      APPENDECTOMY       SECTION      CHOLECYSTECTOMY      COSMETIC SURGERY      \"tummy tuck\"    FL GUIDED CENTRAL VENOUS ACCESS DEVICE INSERTION  2018    FL GUIDED CENTRAL VENOUS ACCESS DEVICE INSERTION  2024    FL RETROGRADE PYELOGRAM  1/3/2023    GASTRIC BYPASS      HYSTERECTOMY      LAPAROSCOPY      TN ANRCT XM SURG REQ ANES GENERAL SPI/EDRL DX N/A 2021    Procedure: ANAL EXAM UNDER ANESTHESIA; HEMORRHOIDECTOMY;  Surgeon: Dona Jefrfies DO;  Location: [facility];  Service: General    TN CYSTO/URETERO W/LITHOTRIPSY &INDWELL STENT INSRT Left 1/3/2023    Procedure: CYSTOSCOPY URETEROSCOPY WITH RETROGRADE PYELOGRAM, BASKET STONE EXTRACTION AND INSERTION STENT URETERAL;  Surgeon: Geovanny Rosales MD;  Location: [facility];  Service: Urology    TN EXC THROMBOSED HEMORRHOID XTRNL N/A 2022    Procedure: EXCISION OF THROMBOSED EXTERNAL HEMORRHOID, Excision of perianal skin tag, dranage of perianal abscess, anal fistulatomy;  Surgeon: Dona Jeffries DO;  Location: [facility];  Service: General    TN INSJ TUNNELED CTR VAD W/SUBQ PORT AGE 5 YR/> Left 2024    Procedure: INSERTION VENOUS PORT (PORT-A-CATH);  " Surgeon: Ryan Singh DO;  Location: [facility];  Service: General    DC RMVL AMIRAH CTR VAD W/SUBQ PORT/ CTR/PRPH INSJ Right 2/9/2024    Procedure: REMOVAL VENOUS PORT (PORT-A-CATH);  Surgeon: Ryan Singh DO;  Location: [facility];  Service: General    TUNNELED VENOUS PORT PLACEMENT N/A 12/21/2018    Procedure: INSERTION VENOUS PORT (PORT-A-CATH);  Surgeon: Ryan Singh DO;  Location: [facility];  Service: General      Family History   Problem Relation Age of Onset    No Known Problems Mother     Hypertension Father     Diabetes Father     Urolithiasis Father     Prostate cancer Father     Diabetes Maternal Grandfather       Social History     Tobacco Use    Smoking status: Never     Passive exposure: Never    Smokeless tobacco: Never   Vaping Use    Vaping status: Never Used   Substance Use Topics    Alcohol use: Not Currently    Drug use: Not Currently     Types: Marijuana     Comment: Medical marijuana      E-Cigarette/Vaping    E-Cigarette Use Never User       E-Cigarette/Vaping Substances    Nicotine No     THC No     CBD No     Flavoring No     Other No     Unknown No       I have reviewed and agree with the history as documented.     40-year-old female presents for evaluation of left foot injury.  Prior to arrival patient reports dropping a gallon jug on her left foot and experiencing pain.  She did take Tylenol prior to arrival without relief in symptoms.  She does note previous injury to this foot and toe.  She is not able to bear weight due to the pain.  She denies other injury.        Review of Systems   Constitutional:  Negative for activity change and appetite change.   Musculoskeletal:  Positive for arthralgias and gait problem.   All other systems reviewed and are negative.          Objective       ED Triage Vitals [03/11/25 1637]   Temperature Pulse Blood Pressure Respirations SpO2 Patient Position - Orthostatic VS   97.5 °F (36.4 °C) 95 123/72 18 99 % Lying      Temp Source Heart Rate Source  "BP Location FiO2 (%) Pain Score    Temporal Monitor Right arm -- 9      Vitals      Date and Time Temp Pulse SpO2 Resp BP Pain Score FACES Pain Rating User   03/11/25 1706 -- -- -- -- -- 9 -- CLS   03/11/25 1637 97.5 °F (36.4 °C) 95 99 % 18 123/72 9 -- TF            Physical Exam  Vitals reviewed.   Constitutional:       General: She is not in acute distress.     Appearance: Normal appearance. She is not toxic-appearing.   HENT:      Head: Normocephalic and atraumatic.      Right Ear: External ear normal.      Left Ear: External ear normal.      Nose: Nose normal.   Eyes:      General: No scleral icterus.        Right eye: No discharge.         Left eye: No discharge.   Cardiovascular:      Rate and Rhythm: Normal rate.   Pulmonary:      Effort: Pulmonary effort is normal. No respiratory distress.   Musculoskeletal:         General: Tenderness present. No swelling or deformity.      Comments: Pain and skin discoloration distal first metatarsal, no open wounds, DP pulse intact, able to move toes with small movements   Skin:     General: Skin is warm.      Coloration: Skin is not jaundiced or pale.   Neurological:      General: No focal deficit present.      Mental Status: She is alert.         Results Reviewed       None            XR foot 3+ views LEFT    (Results Pending)       Procedures    ED Medication and Procedure Management   Prior to Admission Medications   Prescriptions Last Dose Informant Patient Reported? Taking?   EPINEPHrine (EPIPEN) 0.3 mg/0.3 mL SOAJ  Self Yes No   Sig: Inject 0.3 mg into a muscle   Galcanezumab-gnlm 120 MG/ML SOAJ  Self No No   Sig: Inject 120 mg under the skin every 30 (thirty) days   SUMAtriptan Succinate 6 MG/0.5ML SOAJ  Self No No   Sig: Inject 0.5 mL (6 mg total) under the skin as needed (migraine) May repeat once in 1 hour if needed. Max 12 mg/day, Max 3 days per week   Syringe/Needle, Disp, (SYRINGE 3CC/25GX5/8\") 25G X 5/8\" 3 ML MISC  Self No No   Sig: Use once monthly for B12 " injection.   amitriptyline (ELAVIL) 50 mg tablet  Self No No   Sig: Take 1.5 tablets (75 mg total) by mouth daily at bedtime   cyanocobalamin 1,000 mcg/mL  Self No No   Sig: Inject 1 mL (1,000 mcg total) into a muscle every 30 (thirty) days Next dose due 2023   dexamethasone (DECADRON) 4 mg tablet  Self No No   Sig: Take 8 mg qam x 1 day, take 4 mg qam x 1 day, then take 2 mg qam x 4 days then stop   ergocalciferol (ERGOCALCIFEROL) 1.25 MG (03731 UT) capsule  Self No No   Sig: Take 1 capsule (50,000 Units total) by mouth 2 (two) times a week  and    escitalopram (LEXAPRO) 20 mg tablet  Self Yes No   Si mg daily at bedtime   gabapentin (NEURONTIN) 100 mg capsule  Self Yes No   Sig: Take 100 mg by mouth Three times a day   meclizine (ANTIVERT) 12.5 MG tablet  Self No No   Sig: Take 1 tablet (12.5 mg total) by mouth 3 (three) times a day as needed for dizziness Do not take daily   ondansetron (ZOFRAN-ODT) 4 mg disintegrating tablet  Self Yes No   Sig: Take 4 mg by mouth every 6 (six) hours as needed for nausea or vomiting   potassium chloride 20 MEQ/50ML  Self No No   Sig: Inject 200 mL (80 mEq total) into a catheter in a vein daily 80 meq in 1 litre bag to be infused daily over 8 hours. Continue as ordered prior to admission   spironolactone (ALDACTONE) 25 mg tablet  Self No No   Sig: Take 0.5 tablets (12.5 mg total) by mouth daily      Facility-Administered Medications: None     Discharge Medication List as of 3/11/2025  5:46 PM        START taking these medications    Details   oxyCODONE (ROXICODONE) 5 immediate release tablet Take 1 tablet (5 mg total) by mouth every 8 (eight) hours as needed for severe pain for up to 7 doses Max Daily Amount: 15 mg, Starting Tue 3/11/2025, Normal           CONTINUE these medications which have NOT CHANGED    Details   amitriptyline (ELAVIL) 50 mg tablet Take 1.5 tablets (75 mg total) by mouth daily at bedtime, Starting 2024, Normal     "  cyanocobalamin 1,000 mcg/mL Inject 1 mL (1,000 mcg total) into a muscle every 30 (thirty) days Next dose due 5/1/2023, Starting Tue 4/18/2023, No Print      dexamethasone (DECADRON) 4 mg tablet Take 8 mg qam x 1 day, take 4 mg qam x 1 day, then take 2 mg qam x 4 days then stop, Phone In      EPINEPHrine (EPIPEN) 0.3 mg/0.3 mL SOAJ Inject 0.3 mg into a muscle, Starting Fri 7/5/2024, Historical Med      ergocalciferol (ERGOCALCIFEROL) 1.25 MG (98642 UT) capsule Take 1 capsule (50,000 Units total) by mouth 2 (two) times a week Mondays and Thursdays, Starting Mon 2/26/2024, Normal      escitalopram (LEXAPRO) 20 mg tablet 20 mg daily at bedtime, Starting Tue 3/14/2023, Historical Med      gabapentin (NEURONTIN) 100 mg capsule Take 100 mg by mouth Three times a day, Starting Mon 11/18/2024, Until Tue 11/18/2025, Historical Med      Galcanezumab-gnlm 120 MG/ML SOAJ Inject 120 mg under the skin every 30 (thirty) days, Starting Fri 12/13/2024, Normal      meclizine (ANTIVERT) 12.5 MG tablet Take 1 tablet (12.5 mg total) by mouth 3 (three) times a day as needed for dizziness Do not take daily, Starting Tue 1/7/2025, Normal      ondansetron (ZOFRAN-ODT) 4 mg disintegrating tablet Take 4 mg by mouth every 6 (six) hours as needed for nausea or vomiting, Starting Mon 6/12/2023, Historical Med      potassium chloride 20 MEQ/50ML Inject 200 mL (80 mEq total) into a catheter in a vein daily 80 meq in 1 litre bag to be infused daily over 8 hours. Continue as ordered prior to admission, Starting Mon 8/26/2024, Normal      spironolactone (ALDACTONE) 25 mg tablet Take 0.5 tablets (12.5 mg total) by mouth daily, Starting Sat 1/11/2025, No Print      SUMAtriptan Succinate 6 MG/0.5ML SOAJ Inject 0.5 mL (6 mg total) under the skin as needed (migraine) May repeat once in 1 hour if needed. Max 12 mg/day, Max 3 days per week, Starting Tue 1/7/2025, Normal      Syringe/Needle, Disp, (SYRINGE 3CC/25GX5/8\") 25G X 5/8\" 3 ML MISC Use once monthly " for B12 injection., Normal           No discharge procedures on file.  ED SEPSIS DOCUMENTATION   Time reflects when diagnosis was documented in both MDM as applicable and the Disposition within this note       Time User Action Codes Description Comment    3/11/2025  5:41 PM Melissa Rangel Add [S97.82XA] Crush injury of left foot                  Melissa Rangel DO  03/11/25 8753

## 2025-03-11 NOTE — PROGRESS NOTES
Name: Brittani Patino      : 1985      MRN: [id number]  Encounter Provider: ARIADNA Gtz  Encounter Date: 3/11/2025   Encounter department: [facility]  :  Assessment & Plan  Chronic migraine without aura without status migrainosus, not intractable  Brittani Patino is a 40 year old female with a hx of migraine headaches since 2016. Her migraines are now occurring 1-2 times per week on amitriptyline 75 mg daily and Emgality 120 mg monthly. We discussed her migraines may continue to improve with consistent monthly use of Emgality. I encouraged her to continue with adequate hydration, sleep, and vitamin supplementation. For migraine , she will continue to use sumatriptan SC  6 mg +/- zofran 4 mg +/- Tylenol 1000 mg, no more than 3 times per week. She was advised she may use this in combination with or without Tylenol and Zofran. She may also continue to use meclizine as needed, preferably no more than 3 times per week. She will continue to monitor and track her migraines and follow up in 4 months to assess full effectiveness of Emgality with consistent use over that time; sooner if needed.         Patient Instructions   Headache/migraine treatment:   - When you have a moderate to severe headache, you should seek rest, initiate relaxation and apply cold compresses to the head.      Abortive medications (for immediate treatment of a headache):   It is ok to take ibuprofen, acetaminophen or naproxen (Advil, Tylenol,  Aleve, Excedrin) if they help your headaches you should limit these to no more than 3 times a week to avoid medication overuse/rebound headaches.      Take at the onset of a migraine use Sumatriptan SC 6 mg +/- zofran 4 mg +/- Tylenol 1000 mg  You may repeat sumatriptan 6 mg in 1 hour if needed. Max 12 mg/day, Max 3 days per week.     Over the counter preventive supplements for headaches/migraines   (to take every day to help prevent headaches - not to take  "at the time of headache):  [x] Magnesium 500 mg 1-2 times daily (If any diarrhea or upset stomach, decrease dose  as tolerated)  [x] Riboflavin (Vitamin B2) 400mg daily (FYI B2 may make your urine bright/neon yellow)     Prescription preventive medications for headaches/migraines   (to take every day to help prevent headaches - not to take at the time of headache):     Continue Amitriptyline 75 mg daily.  Continue [id number] mg (1 injection) every 30 days thereafter      *Typically these types of medications take time untill you see the benefit, although some may see improvement in days, often it may take weeks, especially if the medication is being titrated up to a beneficial level. Please contact us if there are any concerns or questions regarding the medication.      Self-Monitoring:  [x] Headache calendar. Each day donna a number from 0-10 indicating if there was a headache and how bad it was.  This can be used to monitor gradual improvement and is helpful to make medication adjustments. You can do this on paper or there is an ANNY for a smart phone called \"Migraine e Diary\".      Lifestyle Recommendations:  [x] SLEEP - Maintain a regular sleep schedule: Adults need at least 7-8 hours of uninterrupted a night. Maintain good sleep hygiene:  Going to bed and waking up at consistent times, avoiding excessive daytime naps, avoiding caffeinated beverages in the evening, avoid excessive stimulation in the evening and generally using bed primarily for sleeping.  One hour before bedtime would recommend turning lights down lower, decreasing your activity (may read quietly, listen to music at a low volume). When you get into bed, should eliminate all technology (no texting, emailing, playing with your phone, iPad or tablet in bed).  [x] HYDRATION - Maintain good hydration.  Drink  2L of fluid a day (4 typical small water bottles)  [x] DIET - Maintain good nutrition. In particular don't skip meals and try and eat healthy " balanced meals regularly.  [x] TRIGGERS - Look for other triggers and avoid them: Limit caffeine to 1-2 cups a day or less. Avoid dietary triggers that you have noticed bring on your headaches (this could include aged cheese, peanuts, MSG, aspartame and nitrates).  [x] EXERCISE - physical exercise as we all know is good for you in many ways, and not only is good for your heart, but also is beneficial for your mental health, cognitive health and  chronic pain/headaches. I would encourage at the least 5 days of physical exercise weekly for at least 30 minutes.      Education and Follow-up  [x] Please call with any questions or concerns. Of course if any new concerning symptoms go to the emergency department.  [x] Follow up in 4 months, sooner if needed      History of Present Illness     HPI  Brittani Patino is a 40 year old female with a hx of migraine headaches since . She was last seen in follow up 2024. At that time she reported an improvement in the frequency of her migraines with increasing amitriptyline to 75 mg daily but stated they were still occurring 2-3 times per week. As such she was prescribed Emgality to take in addition to Amitriptyline. For migraine , she continued sumatriptan SC  6 mg +/- zofran 4 mg +/- Tylenol 1000 mg, no more than 3 times per week.     She presents today in follow up and states she continues on Amitriptyline 75 mg daily. She did start Emgality in November but then due to changes in insurance was unable to inject in December or January. She did resume Emgality in mid February and is due for her next injection in mid March. She reports even with this, her migraines have improved with initiating Emgality. She reports migraines are now 1-2 times per week. She states she generally rests in a quiet dark room. She has noted reading on her phone or ipad is a migraine trigger. She is using Sumatriptan SC on average once every other week at this time. She continues to use  meclizine for vertigo on average once a week. Last month when she contacted the office with an intractable migraine, she was prescribed Decadron and this did help to break her migraine cycle. She tolerated this well- other than being more hungry while completing the taper.     She denies any new migraine characteristics or neurologic complaints today.     Review of Systems   Constitutional:  Negative for appetite change, fatigue and fever.   HENT: Negative.  Negative for hearing loss, tinnitus, trouble swallowing and voice change.    Eyes: Negative.  Negative for photophobia, pain and visual disturbance.   Respiratory: Negative.  Negative for shortness of breath.    Cardiovascular: Negative.  Negative for palpitations.   Gastrointestinal: Negative.  Negative for nausea and vomiting.   Endocrine: Negative.  Negative for cold intolerance.   Genitourinary: Negative.  Negative for dysuria, frequency and urgency.   Musculoskeletal:  Negative for back pain, gait problem, myalgias, neck pain and neck stiffness.   Skin: Negative.  Negative for rash.   Allergic/Immunologic: Negative.    Neurological:  Positive for headaches (1-2 weekly). Negative for dizziness, tremors, seizures, syncope, facial asymmetry, speech difficulty, weakness, light-headedness and numbness.   Hematological: Negative.  Does not bruise/bleed easily.   Psychiatric/Behavioral: Negative.  Negative for confusion, hallucinations and sleep disturbance.    All other systems reviewed and are negative.    I have personally reviewed the MA's review of systems and made changes as necessary.    Current Outpatient Medications on File Prior to Visit   Medication Sig Dispense Refill    amitriptyline (ELAVIL) 50 mg tablet Take 1.5 tablets (75 mg total) by mouth daily at bedtime 135 tablet 1    cyanocobalamin 1,000 mcg/mL Inject 1 mL (1,000 mcg total) into a muscle every 30 (thirty) days Next dose due 5/1/2023      dexamethasone (DECADRON) 4 mg tablet Take 8 mg qam x 1  "day, take 4 mg qam x 1 day, then take 2 mg qam x 4 days then stop 5 tablet 0    EPINEPHrine (EPIPEN) 0.3 mg/0.3 mL SOAJ Inject 0.3 mg into a muscle      ergocalciferol (ERGOCALCIFEROL) 1.25 MG (01207 UT) capsule Take 1 capsule (50,000 Units total) by mouth 2 (two) times a week Mondays and Thursdays 24 capsule 1    escitalopram (LEXAPRO) 20 mg tablet 20 mg daily at bedtime      gabapentin (NEURONTIN) 100 mg capsule Take 100 mg by mouth Three times a day      Galcanezumab-gnlm 120 MG/ML SOAJ Inject 120 mg under the skin every 30 (thirty) days 1 mL 11    meclizine (ANTIVERT) 12.5 MG tablet Take 1 tablet (12.5 mg total) by mouth 3 (three) times a day as needed for dizziness Do not take daily 30 tablet 1    ondansetron (ZOFRAN-ODT) 4 mg disintegrating tablet Take 4 mg by mouth every 6 (six) hours as needed for nausea or vomiting      potassium chloride 20 MEQ/50ML Inject 200 mL (80 mEq total) into a catheter in a vein daily 80 meq in 1 litre bag to be infused daily over 8 hours. Continue as ordered prior to admission 5600 mL 11    spironolactone (ALDACTONE) 25 mg tablet Take 0.5 tablets (12.5 mg total) by mouth daily      SUMAtriptan Succinate 6 MG/0.5ML SOAJ Inject 0.5 mL (6 mg total) under the skin as needed (migraine) May repeat once in 1 hour if needed. Max 12 mg/day, Max 3 days per week 3 mL 1    Syringe/Needle, Disp, (SYRINGE 3CC/25GX5/8\") 25G X 5/8\" 3 ML MISC Use once monthly for B12 injection. 1 each 11     No current facility-administered medications on file prior to visit.         Objective   /72 (BP Location: Right arm, Patient Position: Sitting, Cuff Size: Standard)   Pulse 77   Ht 5' 2\" (1.575 m)   Wt 59.1 kg (130 lb 6.4 oz)   LMP  (LMP Unknown)   SpO2 98%   BMI 23.85 kg/m²     Neurological Exam  On neurological examination patient is alert, awake, oriented and in no distress. Speech is fluent without dysarthria or aphasia. She is able to rise easily without assistance from a seated position. " Casual gait is normal including stance, stride, and arm swing.        Administrative Statements   I have spent a total time of 30 minutes in caring for this patient on the day of the visit/encounter including Prognosis, Risks and benefits of tx options, Instructions for management, Patient and family education, Importance of tx compliance, Risk factor reductions, Impressions, Counseling / Coordination of care, Documenting in the medical record, Reviewing/placing orders in the medical record (including tests, medications, and/or procedures), and Obtaining or reviewing history  .

## 2025-03-11 NOTE — ASSESSMENT & PLAN NOTE
Brittani Patino is a 40 year old female with a hx of migraine headaches since 2016. Her migraines are now occurring 1-2 times per week on amitriptyline 75 mg daily and Emgality 120 mg monthly. We discussed her migraines may continue to improve with consistent monthly use of Emgality. I encouraged her to continue with adequate hydration, sleep, and vitamin supplementation. For migraine , she will continue to use sumatriptan SC  6 mg +/- zofran 4 mg +/- Tylenol 1000 mg, no more than 3 times per week. She was advised she may use this in combination with or without Tylenol and Zofran. She may also continue to use meclizine as needed, preferably no more than 3 times per week. She will continue to monitor and track her migraines and follow up in 4 months to assess full effectiveness of Emgality with consistent use over that time; sooner if needed.

## 2025-03-12 DIAGNOSIS — G43.709 CHRONIC MIGRAINE WITHOUT AURA WITHOUT STATUS MIGRAINOSUS, NOT INTRACTABLE: ICD-10-CM

## 2025-03-13 RX ORDER — AMITRIPTYLINE HYDROCHLORIDE 50 MG/1
75 TABLET ORAL
Qty: 135 TABLET | Refills: 1 | Status: SHIPPED | OUTPATIENT
Start: 2025-03-13

## 2025-03-14 ENCOUNTER — OFFICE VISIT (OUTPATIENT)
Dept: PODIATRY | Facility: CLINIC | Age: 40
End: 2025-03-14
Payer: COMMERCIAL

## 2025-03-14 VITALS
TEMPERATURE: 98.1 F | OXYGEN SATURATION: 98 % | BODY MASS INDEX: 23.92 KG/M2 | HEART RATE: 98 BPM | HEIGHT: 62 IN | RESPIRATION RATE: 16 BRPM | WEIGHT: 130 LBS

## 2025-03-14 DIAGNOSIS — T14.8XXA CONTUSION OF BONE: Primary | ICD-10-CM

## 2025-03-14 DIAGNOSIS — S97.82XD CRUSHING INJURY OF LEFT FOOT, SUBSEQUENT ENCOUNTER: ICD-10-CM

## 2025-03-14 PROCEDURE — 99213 OFFICE O/P EST LOW 20 MIN: CPT | Performed by: PODIATRIST

## 2025-03-14 NOTE — PROGRESS NOTES
Bone contusion, first MPJ sprain  Extensor tendon appears intact however exam limited due to guarding  Negative for fracture  Name: Brittani Patino      : 1985      MRN: [id number]  Encounter Provider: Merle Starr DPM  Encounter Date: 3/14/2025   Encounter department: [facility] [place]  :  Assessment & Plan      Imaging Reviewed at this visit (I personally reviewed/independently interpreted images and reports in PACS)  XR left foot WB 3v 3/11/2025: No acute osseous abnormalities noted.  No acute fracture or dislocation noted.        IMPRESSION:  LLE first met head bone contusion  LLE first MPJ crush injury/sprain    PLAN:  I reviewed clinical exam and radiographic imaging (XR) with patient in detail today. I have discussed with the patient the pathophysiology of this diagnosis and reviewed how the examination correlates with this diagnosis.  ED note from 3/11/2025 reviewed  Patient counseled on bone contusions and joint sprains  Patient counseled that bone contusions may take several months to fully resolve.  Counseled on rest, ice, elevation, immobilization at this time  Patient counseled on first MPJ sprain and need for immobilization and use of cam boot for healing  Continue WBAT to LLE with CAM boot  Patient should continue with rest, ice, elevation, immobilization, and use of NSAIDs and Tylenol  Patient will follow-up in 3 weeks for reevaluation, if symptoms do not improve may consider further imaging    History of Present Illness   HPI  Brittani Patino is a 40 y.o. female who presents evaluation management of left foot injury.  Patient states that on 3/11/2025 she dropped a gallon jug of water straight onto her left great toe joint.  She had immediate pain and developed bruising and swelling.  Patient states she had extreme difficulty walking.  She presented to the ED at [facility] for further evaluation and management.  There she was told x-rays were negative and was given referral  "for podiatry and a cam boot for ambulation.  Since that time she has been trying to keep her foot elevated.  She states heat makes the area feel worse however ice makes it feel better.  She has been taking Tylenol as needed for pain.  Patient denies any open wounds or blistering in the area.  She denies any deformity.  She presents today for further evaluation and management      Review of Systems   Constitutional: Negative.    Eyes: Negative.    Respiratory:  Negative for shortness of breath.    Musculoskeletal:  Positive for arthralgias, gait problem and joint swelling.   Skin:  Negative for wound.          Objective   Pulse 98   Temp 98.1 °F (36.7 °C)   Resp 16   Ht 5' 2\" (1.575 m)   Wt 59 kg (130 lb)   LMP  (LMP Unknown)   SpO2 98%   BMI 23.78 kg/m²      Physical Exam  Constitutional:       Appearance: She is not ill-appearing.   Cardiovascular:      Pulses: Normal pulses.   Musculoskeletal:      Comments: Left foot pain with range of motion of first MPJ primarily dorsally.  Active range of motion guarded due to pain.  Passive range of motion guarded due to pain.  Patient able to slightly dorsiflex, plantarflex great toe at first MPJ, extensor tendon appears intact to FHL.  Tender with palpation over anterior and medial aspect of metatarsal head.  No pain with palpation of plantar first MPJ  MMT guarded due to pain   Skin:     Capillary Refill: Capillary refill takes less than 2 seconds.      Findings: Bruising present.      Comments: Left foot dorsal first MPJ ecchymosis noted with trace edema.  No deformity noted, no blistering noted.   Neurological:      Sensory: No sensory deficit.           "

## 2025-04-01 DIAGNOSIS — E87.6 HYPOKALEMIA: Primary | ICD-10-CM

## 2025-04-07 ENCOUNTER — APPOINTMENT (OUTPATIENT)
Dept: LAB | Facility: CLINIC | Age: 40
End: 2025-04-07
Payer: COMMERCIAL

## 2025-04-07 DIAGNOSIS — E87.6 HYPOKALEMIA: ICD-10-CM

## 2025-04-07 LAB
ANION GAP SERPL CALCULATED.3IONS-SCNC: 10 MMOL/L (ref 4–13)
BUN SERPL-MCNC: 14 MG/DL (ref 5–25)
CALCIUM SERPL-MCNC: 8.5 MG/DL (ref 8.4–10.2)
CHLORIDE SERPL-SCNC: 107 MMOL/L (ref 96–108)
CO2 SERPL-SCNC: 21 MMOL/L (ref 21–32)
CREAT SERPL-MCNC: 1.05 MG/DL (ref 0.6–1.3)
GFR SERPL CREATININE-BSD FRML MDRD: 66 ML/MIN/1.73SQ M
GLUCOSE SERPL-MCNC: 80 MG/DL (ref 65–140)
POTASSIUM SERPL-SCNC: 3.3 MMOL/L (ref 3.5–5.3)
SODIUM SERPL-SCNC: 138 MMOL/L (ref 135–147)

## 2025-04-07 PROCEDURE — 36415 COLL VENOUS BLD VENIPUNCTURE: CPT

## 2025-04-07 PROCEDURE — 80048 BASIC METABOLIC PNL TOTAL CA: CPT

## 2025-04-08 ENCOUNTER — RESULTS FOLLOW-UP (OUTPATIENT)
Age: 40
End: 2025-04-08

## 2025-04-10 ENCOUNTER — APPOINTMENT (OUTPATIENT)
Dept: LAB | Facility: CLINIC | Age: 40
End: 2025-04-10
Payer: COMMERCIAL

## 2025-04-10 ENCOUNTER — RESULTS FOLLOW-UP (OUTPATIENT)
Dept: URGENT CARE | Facility: CLINIC | Age: 40
End: 2025-04-10

## 2025-04-10 ENCOUNTER — APPOINTMENT (OUTPATIENT)
Dept: RADIOLOGY | Facility: CLINIC | Age: 40
End: 2025-04-10
Payer: COMMERCIAL

## 2025-04-10 ENCOUNTER — OFFICE VISIT (OUTPATIENT)
Dept: URGENT CARE | Facility: CLINIC | Age: 40
End: 2025-04-10
Payer: COMMERCIAL

## 2025-04-10 VITALS
WEIGHT: 131 LBS | OXYGEN SATURATION: 100 % | TEMPERATURE: 97.5 F | HEIGHT: 62 IN | RESPIRATION RATE: 16 BRPM | SYSTOLIC BLOOD PRESSURE: 117 MMHG | BODY MASS INDEX: 24.11 KG/M2 | DIASTOLIC BLOOD PRESSURE: 58 MMHG | HEART RATE: 107 BPM

## 2025-04-10 DIAGNOSIS — B34.9 VIRAL INFECTION: ICD-10-CM

## 2025-04-10 DIAGNOSIS — J06.0 ACUTE LARYNGOPHARYNGITIS: Primary | ICD-10-CM

## 2025-04-10 DIAGNOSIS — E87.6 HYPOKALEMIA: ICD-10-CM

## 2025-04-10 DIAGNOSIS — R07.89 CHEST TIGHTNESS: ICD-10-CM

## 2025-04-10 LAB
ANION GAP SERPL CALCULATED.3IONS-SCNC: 12 MMOL/L (ref 4–13)
BUN SERPL-MCNC: 16 MG/DL (ref 5–25)
CALCIUM SERPL-MCNC: 9.1 MG/DL (ref 8.4–10.2)
CHLORIDE SERPL-SCNC: 101 MMOL/L (ref 96–108)
CO2 SERPL-SCNC: 22 MMOL/L (ref 21–32)
CREAT SERPL-MCNC: 1.24 MG/DL (ref 0.6–1.3)
GFR SERPL CREATININE-BSD FRML MDRD: 54 ML/MIN/1.73SQ M
GLUCOSE SERPL-MCNC: 102 MG/DL (ref 65–140)
POTASSIUM SERPL-SCNC: 3.2 MMOL/L (ref 3.5–5.3)
SODIUM SERPL-SCNC: 135 MMOL/L (ref 135–147)

## 2025-04-10 PROCEDURE — 80048 BASIC METABOLIC PNL TOTAL CA: CPT

## 2025-04-10 PROCEDURE — 99214 OFFICE O/P EST MOD 30 MIN: CPT

## 2025-04-10 PROCEDURE — 71046 X-RAY EXAM CHEST 2 VIEWS: CPT

## 2025-04-10 PROCEDURE — 36415 COLL VENOUS BLD VENIPUNCTURE: CPT

## 2025-04-10 RX ORDER — METHYLPREDNISOLONE 4 MG/1
TABLET ORAL
Qty: 1 EACH | Refills: 0 | Status: SHIPPED | OUTPATIENT
Start: 2025-04-10 | End: 2025-04-17

## 2025-04-10 RX ORDER — AMOXICILLIN AND CLAVULANATE POTASSIUM 500; 125 MG/1; MG/1
TABLET, FILM COATED ORAL
COMMUNITY
Start: 2025-04-03 | End: 2025-04-17

## 2025-04-10 RX ORDER — METHYLPREDNISOLONE 4 MG/1
TABLET ORAL
Qty: 1 EACH | Refills: 0 | Status: SHIPPED | OUTPATIENT
Start: 2025-04-10 | End: 2025-04-10

## 2025-04-10 NOTE — PROGRESS NOTES
[facility]  Name: Brittani Patino      : 1985      MRN: [id number]  Encounter Provider: Simeon Valera PA-C  Encounter Date: 4/10/2025   Encounter department: [facility] [place]  :  Assessment & Plan  Chest tightness    Orders:    XR chest pa and lateral; Future    methylPREDNISolone 4 MG tablet therapy pack; Use as directed on package    Acute laryngopharyngitis    Orders:    methylPREDNISolone 4 MG tablet therapy pack; Use as directed on package    Patient already on 7-day course of prednisone.  Preliminary view of chest x-ray shows no acute cardiopulmonary disease at this time.  Opting to place patient on Medrol pack to which she has no reaction to, only has allergy to prednisone and has tolerated Medrol packs in the past.  Decrease aggravation of stroke.  Provide red flag symptoms of epiglottitis, peritonsillar abscess, and retropharyngeal abscess to patient including throat closing, drooling, inability to tolerate secretions, increasing pain, worsening and persistent fevers.  Recommended should the symptoms arise to go to the emergency room.  Expressed understanding.    Patient Instructions  Follow up with PCP in 3-5 days.  Proceed to  ER if symptoms worsen.    If tests are performed, our office will contact you with results only if changes need to made to the care plan discussed with you at the visit. You can review your full results on [facility] MyChart.    Chief Complaint:   Chief Complaint   Patient presents with    Cold Like Symptoms     Loss of voice, sore throat, fevers  Started 2 weeks ago;   Was seen through [facility] and treated for strep and was rx augmentin today is her last day of medication but has not had any relief      History of Present Illness   40-year-old female presenting with persistent sore throat and difficulty breathing x 2 weeks.  Patient states that about 2 weeks ago she started to have left sore throat, loss of voice, low-grade fevers and malaise.  About 5  days ago she had a telemedicine appointment that was concerning for strep given the duration of the sore throat and voice changes so they placed her on Augmentin for several days.  She currently still has 2 to 3 days doses left.  Says she has noticed no improvement while on it and symptoms are persisting.  Denies any cough but notices that it still hurts a lot to swallow, she feels some mild difficulty breathing feeling she cannot take a deep breath more than usual and she is still persisting to have fevers with her last temperature being today at 100.8 orally.  Using acetaminophen for temperature control as well as pain control.  Denies any sick contacts and no other known around sick.  Denies any other medication use for symptom control.          Review of Systems   Constitutional:  Positive for fatigue and fever. Negative for chills and diaphoresis.   HENT:  Positive for postnasal drip and sore throat. Negative for congestion, ear discharge, ear pain, rhinorrhea and sinus pressure.    Respiratory:  Positive for chest tightness. Negative for cough and choking.    Cardiovascular:  Negative for chest pain and palpitations.   Gastrointestinal:  Negative for diarrhea, nausea and vomiting.   Musculoskeletal:  Negative for arthralgias and myalgias.   Skin:  Negative for color change.   Neurological:  Negative for dizziness, light-headedness and headaches.     Past Medical History   Past Medical History:   Diagnosis Date    C. difficile colitis 2016    COVID-19     1/2022- pt denies long covid    DUB (dysfunctional uterine bleeding)     H. pylori infection     Hypertension     Hypokalemia     Kidney stone November 2023    Left lower quadrant abdominal pain 08/17/2020    Migraine     WAGNER (obstructive sleep apnea) 06/11/2014    Pancreatitis     Psychiatric disorder     Anxiety    Rectal bleeding     Renal disorder     Rhabdomyolysis     Sleep apnea     resolved with bariatric surgery    Thrombosed external hemorrhoid   "    Past Surgical History:   Procedure Laterality Date    ABDOMINAL SURGERY      APPENDECTOMY       SECTION      CHOLECYSTECTOMY      COSMETIC SURGERY      \"tummy tuck\"    FL GUIDED CENTRAL VENOUS ACCESS DEVICE INSERTION  2018    FL GUIDED CENTRAL VENOUS ACCESS DEVICE INSERTION  2024    FL RETROGRADE PYELOGRAM  1/3/2023    GASTRIC BYPASS      HYSTERECTOMY      LAPAROSCOPY      NJ ANRCT XM SURG REQ ANES GENERAL SPI/EDRL DX N/A 2021    Procedure: ANAL EXAM UNDER ANESTHESIA; HEMORRHOIDECTOMY;  Surgeon: Dona Jeffries DO;  Location: [facility];  Service: General    NJ CYSTO/URETERO W/LITHOTRIPSY &INDWELL STENT INSRT Left 1/3/2023    Procedure: CYSTOSCOPY URETEROSCOPY WITH RETROGRADE PYELOGRAM, BASKET STONE EXTRACTION AND INSERTION STENT URETERAL;  Surgeon: Geovanny Rosales MD;  Location: [facility];  Service: Urology    NJ EXC THROMBOSED HEMORRHOID XTRNL N/A 2022    Procedure: EXCISION OF THROMBOSED EXTERNAL HEMORRHOID, Excision of perianal skin tag, dranage of perianal abscess, anal fistulatomy;  Surgeon: Dona Jeffries DO;  Location: [facility];  Service: General    NJ INSJ TUNNELED CTR VAD W/SUBQ PORT AGE 5 YR/> Left 2024    Procedure: INSERTION VENOUS PORT (PORT-A-CATH);  Surgeon: Ryan Singh DO;  Location: [facility];  Service: General    NJ RMVL AMIRAH CTR VAD W/SUBQ PORT/ CTR/PRPH INSJ Right 2024    Procedure: REMOVAL VENOUS PORT (PORT-A-CATH);  Surgeon: Ryan Singh DO;  Location: [facility];  Service: General    TUNNELED VENOUS PORT PLACEMENT N/A 2018    Procedure: INSERTION VENOUS PORT (PORT-A-CATH);  Surgeon: Ryan Singh DO;  Location: [facility];  Service: General     Family History   Problem Relation Age of Onset    No Known Problems Mother     Hypertension Father     Diabetes Father     Urolithiasis Father     Prostate cancer Father     Diabetes Maternal Grandfather      she reports that she has never smoked. She has never been exposed " "to tobacco smoke. She has never used smokeless tobacco. She reports that she does not currently use alcohol. She reports that she does not currently use drugs after having used the following drugs: Marijuana.  Current Outpatient Medications   Medication Instructions    amitriptyline (ELAVIL) 75 mg, Oral, Daily at bedtime    amoxicillin-clavulanate (AUGMENTIN) 500-125 mg per tablet take 1 tab(s) orally every 8 hours for 7 days    cyanocobalamin 1,000 mcg, Intramuscular, Every 30 days, Next dose due 5/1/2023    dexamethasone (DECADRON) 4 mg tablet Take 8 mg qam x 1 day, take 4 mg qam x 1 day, then take 2 mg qam x 4 days then stop    EPINEPHrine (EPIPEN) 0.3 mg    ergocalciferol (ERGOCALCIFEROL) 50,000 Units, Oral, 2 times weekly, Mondays and Thursdays    escitalopram (LEXAPRO) 20 mg, Daily at bedtime    gabapentin (NEURONTIN) 100 mg, 3 times daily    Galcanezumab-gnlm 120 mg, Subcutaneous, Every 30 days    meclizine (ANTIVERT) 12.5 mg, Oral, 3 times daily PRN, Do not take daily    methylPREDNISolone 4 MG tablet therapy pack Use as directed on package    ondansetron (ZOFRAN-ODT) 4 mg, Every 6 hours PRN    oxyCODONE (ROXICODONE) 5 mg, Oral, Every 8 hours PRN    potassium chloride 20 MEQ/50ML 80 mEq, Intravenous, Daily, 80 meq in 1 litre bag to be infused daily over 8 hours. Continue as ordered prior to admission    spironolactone (ALDACTONE) 12.5 mg, Oral, Daily    SUMAtriptan Succinate 6 mg, Subcutaneous, As needed, May repeat once in 1 hour if needed. Max 12 mg/day, Max 3 days per week    Syringe/Needle, Disp, (SYRINGE 3CC/25GX5/8\") 25G X 5/8\" 3 ML MISC Use once monthly for B12 injection.     Allergies   Allergen Reactions    Other Anaphylaxis     Jelly fish    Nsaids Other (See Comments)     Other reaction(s): Other (Please comment)  Should not take s/p bariatric surgery  Other reaction(s): Other (See Comments)  Should not take as per prior records  Should not take s/p bariatric surgery  Has hx of gastric bypass      " "Prednisone      Nausea, vomiting, diarrhea        Objective   /58   Pulse (!) 107   Temp 97.5 °F (36.4 °C)   Resp 16   Ht 5' 2\" (1.575 m)   Wt 59.4 kg (131 lb)   LMP  (LMP Unknown)   SpO2 100%   BMI 23.96 kg/m²      Physical Exam  Vitals and nursing note reviewed.   Constitutional:       General: She is not in acute distress.     Appearance: She is normal weight.      Comments: Mildly decreased voice volume as well as hoarseness.  Currently in cam boot left side for other issues.   HENT:      Head: Normocephalic and atraumatic.      Right Ear: Tympanic membrane, ear canal and external ear normal.      Left Ear: Tympanic membrane, ear canal and external ear normal.      Nose: Nose normal. No congestion.      Mouth/Throat:      Mouth: Mucous membranes are moist.      Pharynx: Oropharynx is clear. Posterior oropharyngeal erythema present. No oropharyngeal exudate.      Comments: No visible peritonsillar abscess, exudates or swelling.  Eyes:      General:         Right eye: No discharge.         Left eye: No discharge.      Conjunctiva/sclera: Conjunctivae normal.      Pupils: Pupils are equal, round, and reactive to light.   Cardiovascular:      Rate and Rhythm: Regular rhythm. Tachycardia present.      Pulses: Normal pulses.      Heart sounds: Normal heart sounds.   Pulmonary:      Effort: Pulmonary effort is normal. No respiratory distress.      Breath sounds: Normal breath sounds. No wheezing or rhonchi.   Abdominal:      General: Abdomen is flat. Bowel sounds are normal.      Tenderness: There is no abdominal tenderness.   Musculoskeletal:      Cervical back: Normal range of motion. No tenderness.   Lymphadenopathy:      Cervical: No cervical adenopathy.   Skin:     General: Skin is warm.      Capillary Refill: Capillary refill takes less than 2 seconds.   Neurological:      General: No focal deficit present.      Mental Status: She is alert and oriented to person, place, and time.   Psychiatric:    " "     Mood and Affect: Mood normal.         Behavior: Behavior normal.         Portions of the record may have been created with voice recognition software.  Occasional wrong word or \"sound a like\" substitutions may have occurred due to the inherent limitations of voice recognition software.  Read the chart carefully and recognize, using context, where substitutions have occurred.  "

## 2025-04-13 ENCOUNTER — HOSPITAL ENCOUNTER (INPATIENT)
Facility: HOSPITAL | Age: 40
LOS: 4 days | Discharge: HOME | DRG: 683 | End: 2025-04-17
Attending: EMERGENCY MEDICINE | Admitting: FAMILY MEDICINE
Payer: COMMERCIAL

## 2025-04-13 ENCOUNTER — APPOINTMENT (INPATIENT)
Dept: CT IMAGING | Facility: HOSPITAL | Age: 40
DRG: 683 | End: 2025-04-13
Payer: COMMERCIAL

## 2025-04-13 DIAGNOSIS — E87.29 HIGH ANION GAP METABOLIC ACIDOSIS: ICD-10-CM

## 2025-04-13 DIAGNOSIS — E61.1 IRON DEFICIENCY: ICD-10-CM

## 2025-04-13 DIAGNOSIS — E87.6 HYPOKALEMIA: Primary | ICD-10-CM

## 2025-04-13 DIAGNOSIS — R53.1 WEAKNESS: ICD-10-CM

## 2025-04-13 DIAGNOSIS — D50.8 IRON DEFICIENCY ANEMIA DUE TO DIETARY CAUSES: ICD-10-CM

## 2025-04-13 DIAGNOSIS — N17.9 AKI (ACUTE KIDNEY INJURY) (HCC): ICD-10-CM

## 2025-04-13 PROBLEM — N18.30 STAGE 3 CHRONIC KIDNEY DISEASE (HCC): Status: ACTIVE | Noted: 2025-01-08

## 2025-04-13 PROBLEM — G43.909 MIGRAINE: Status: ACTIVE | Noted: 2025-04-13

## 2025-04-13 LAB
ALBUMIN SERPL BCG-MCNC: 4.9 G/DL (ref 3.5–5)
ALP SERPL-CCNC: 61 U/L (ref 34–104)
ALT SERPL W P-5'-P-CCNC: 17 U/L (ref 7–52)
AMORPH URATE CRY URNS QL MICRO: ABNORMAL
ANION GAP SERPL CALCULATED.3IONS-SCNC: 11 MMOL/L (ref 4–13)
ANION GAP SERPL CALCULATED.3IONS-SCNC: 16 MMOL/L (ref 4–13)
AST SERPL W P-5'-P-CCNC: 18 U/L (ref 13–39)
B-OH-BUTYR SERPL-MCNC: 0.28 MMOL/L (ref 0.02–0.27)
BACTERIA UR QL AUTO: ABNORMAL /HPF
BASE EX.OXY STD BLDV CALC-SCNC: 65.5 % (ref 60–80)
BASE EXCESS BLDV CALC-SCNC: -7.5 MMOL/L
BASOPHILS # BLD AUTO: 0.05 THOUSANDS/ÂΜL (ref 0–0.1)
BASOPHILS NFR BLD AUTO: 0 % (ref 0–1)
BILIRUB SERPL-MCNC: 0.44 MG/DL (ref 0.2–1)
BILIRUB UR QL STRIP: NEGATIVE
BUN SERPL-MCNC: 35 MG/DL (ref 5–25)
BUN SERPL-MCNC: 39 MG/DL (ref 5–25)
CALCIUM SERPL-MCNC: 7.8 MG/DL (ref 8.4–10.2)
CALCIUM SERPL-MCNC: 9.1 MG/DL (ref 8.4–10.2)
CHLORIDE SERPL-SCNC: 104 MMOL/L (ref 96–108)
CHLORIDE SERPL-SCNC: 96 MMOL/L (ref 96–108)
CLARITY UR: ABNORMAL
CO2 SERPL-SCNC: 19 MMOL/L (ref 21–32)
CO2 SERPL-SCNC: 20 MMOL/L (ref 21–32)
COLOR UR: YELLOW
CREAT SERPL-MCNC: 1.84 MG/DL (ref 0.6–1.3)
CREAT SERPL-MCNC: 2.16 MG/DL (ref 0.6–1.3)
CREAT UR-MCNC: 127.5 MG/DL
EOSINOPHIL # BLD AUTO: 0.02 THOUSAND/ÂΜL (ref 0–0.61)
EOSINOPHIL NFR BLD AUTO: 0 % (ref 0–6)
ERYTHROCYTE [DISTWIDTH] IN BLOOD BY AUTOMATED COUNT: 15.3 % (ref 11.6–15.1)
FLUAV AG UPPER RESP QL IA.RAPID: NEGATIVE
FLUBV AG UPPER RESP QL IA.RAPID: NEGATIVE
GFR SERPL CREATININE-BSD FRML MDRD: 27 ML/MIN/1.73SQ M
GFR SERPL CREATININE-BSD FRML MDRD: 33 ML/MIN/1.73SQ M
GLUCOSE SERPL-MCNC: 100 MG/DL (ref 65–140)
GLUCOSE SERPL-MCNC: 146 MG/DL (ref 65–140)
GLUCOSE SERPL-MCNC: 157 MG/DL (ref 65–140)
GLUCOSE UR STRIP-MCNC: NEGATIVE MG/DL
HCO3 BLDV-SCNC: 18.6 MMOL/L (ref 24–30)
HCT VFR BLD AUTO: 43.2 % (ref 34.8–46.1)
HGB BLD-MCNC: 14 G/DL (ref 11.5–15.4)
HGB UR QL STRIP.AUTO: ABNORMAL
HYALINE CASTS #/AREA URNS LPF: ABNORMAL /LPF
IMM GRANULOCYTES # BLD AUTO: 0.05 THOUSAND/UL (ref 0–0.2)
IMM GRANULOCYTES NFR BLD AUTO: 0 % (ref 0–2)
KETONES UR STRIP-MCNC: ABNORMAL MG/DL
LACTATE SERPL-SCNC: 0.8 MMOL/L (ref 0.5–2)
LEUKOCYTE ESTERASE UR QL STRIP: ABNORMAL
LIPASE SERPL-CCNC: 40 U/L (ref 11–82)
LYMPHOCYTES # BLD AUTO: 1.27 THOUSANDS/ÂΜL (ref 0.6–4.47)
LYMPHOCYTES NFR BLD AUTO: 9 % (ref 14–44)
MAGNESIUM SERPL-MCNC: 2.7 MG/DL (ref 1.9–2.7)
MCH RBC QN AUTO: 25.1 PG (ref 26.8–34.3)
MCHC RBC AUTO-ENTMCNC: 32.4 G/DL (ref 31.4–37.4)
MCV RBC AUTO: 77 FL (ref 82–98)
MONOCYTES # BLD AUTO: 1.03 THOUSAND/ÂΜL (ref 0.17–1.22)
MONOCYTES NFR BLD AUTO: 8 % (ref 4–12)
NEUTROPHILS # BLD AUTO: 11.32 THOUSANDS/ÂΜL (ref 1.85–7.62)
NEUTS SEG NFR BLD AUTO: 83 % (ref 43–75)
NITRITE UR QL STRIP: NEGATIVE
NON-SQ EPI CELLS URNS QL MICRO: ABNORMAL /HPF
NRBC BLD AUTO-RTO: 0 /100 WBCS
O2 CT BLDV-SCNC: 11.8 ML/DL
PCO2 BLDV: 40.2 MM HG (ref 42–50)
PH BLDV: 7.28 [PH] (ref 7.3–7.4)
PH UR STRIP.AUTO: 6.5 [PH]
PHOSPHATE SERPL-MCNC: 5.1 MG/DL (ref 2.7–4.5)
PLATELET # BLD AUTO: 361 THOUSANDS/UL (ref 149–390)
PLATELET # BLD AUTO: 449 THOUSANDS/UL (ref 149–390)
PMV BLD AUTO: 10.1 FL (ref 8.9–12.7)
PMV BLD AUTO: 9.8 FL (ref 8.9–12.7)
PO2 BLDV: 39.4 MM HG (ref 35–45)
POTASSIUM SERPL-SCNC: 2.5 MMOL/L (ref 3.5–5.3)
POTASSIUM SERPL-SCNC: 2.9 MMOL/L (ref 3.5–5.3)
PROT SERPL-MCNC: 8.6 G/DL (ref 6.4–8.4)
PROT UR STRIP-MCNC: ABNORMAL MG/DL
RBC # BLD AUTO: 5.58 MILLION/UL (ref 3.81–5.12)
RBC #/AREA URNS AUTO: ABNORMAL /HPF
SARS-COV+SARS-COV-2 AG RESP QL IA.RAPID: NEGATIVE
SODIUM SERPL-SCNC: 131 MMOL/L (ref 135–147)
SODIUM SERPL-SCNC: 135 MMOL/L (ref 135–147)
SODIUM UR-SCNC: <10 MMOL/L
SP GR UR STRIP.AUTO: 1.02 (ref 1–1.03)
T4 FREE SERPL-MCNC: 1.13 NG/DL (ref 0.61–1.12)
TSH SERPL DL<=0.05 MIU/L-ACNC: 5.15 UIU/ML (ref 0.45–4.5)
UROBILINOGEN UR STRIP-ACNC: <2 MG/DL
UUN 24H UR-MCNC: 715 MG/DL
WBC # BLD AUTO: 13.74 THOUSAND/UL (ref 4.31–10.16)
WBC #/AREA URNS AUTO: ABNORMAL /HPF

## 2025-04-13 PROCEDURE — 96365 THER/PROPH/DIAG IV INF INIT: CPT

## 2025-04-13 PROCEDURE — 85025 COMPLETE CBC W/AUTO DIFF WBC: CPT | Performed by: EMERGENCY MEDICINE

## 2025-04-13 PROCEDURE — 80048 BASIC METABOLIC PNL TOTAL CA: CPT | Performed by: FAMILY MEDICINE

## 2025-04-13 PROCEDURE — 96375 TX/PRO/DX INJ NEW DRUG ADDON: CPT

## 2025-04-13 PROCEDURE — 85049 AUTOMATED PLATELET COUNT: CPT

## 2025-04-13 PROCEDURE — 36415 COLL VENOUS BLD VENIPUNCTURE: CPT | Performed by: EMERGENCY MEDICINE

## 2025-04-13 PROCEDURE — 87811 SARS-COV-2 COVID19 W/OPTIC: CPT | Performed by: EMERGENCY MEDICINE

## 2025-04-13 PROCEDURE — 83605 ASSAY OF LACTIC ACID: CPT | Performed by: FAMILY MEDICINE

## 2025-04-13 PROCEDURE — 84443 ASSAY THYROID STIM HORMONE: CPT | Performed by: EMERGENCY MEDICINE

## 2025-04-13 PROCEDURE — 0202U NFCT DS 22 TRGT SARS-COV-2: CPT

## 2025-04-13 PROCEDURE — 82570 ASSAY OF URINE CREATININE: CPT

## 2025-04-13 PROCEDURE — 96366 THER/PROPH/DIAG IV INF ADDON: CPT

## 2025-04-13 PROCEDURE — 82010 KETONE BODYS QUAN: CPT | Performed by: EMERGENCY MEDICINE

## 2025-04-13 PROCEDURE — 87804 INFLUENZA ASSAY W/OPTIC: CPT | Performed by: EMERGENCY MEDICINE

## 2025-04-13 PROCEDURE — 82948 REAGENT STRIP/BLOOD GLUCOSE: CPT

## 2025-04-13 PROCEDURE — 93005 ELECTROCARDIOGRAM TRACING: CPT

## 2025-04-13 PROCEDURE — 81001 URINALYSIS AUTO W/SCOPE: CPT

## 2025-04-13 PROCEDURE — 84540 ASSAY OF URINE/UREA-N: CPT

## 2025-04-13 PROCEDURE — 84439 ASSAY OF FREE THYROXINE: CPT | Performed by: EMERGENCY MEDICINE

## 2025-04-13 PROCEDURE — 82805 BLOOD GASES W/O2 SATURATION: CPT

## 2025-04-13 PROCEDURE — 74176 CT ABD & PELVIS W/O CONTRAST: CPT

## 2025-04-13 PROCEDURE — 83690 ASSAY OF LIPASE: CPT | Performed by: EMERGENCY MEDICINE

## 2025-04-13 PROCEDURE — 99285 EMERGENCY DEPT VISIT HI MDM: CPT | Performed by: EMERGENCY MEDICINE

## 2025-04-13 PROCEDURE — 99223 1ST HOSP IP/OBS HIGH 75: CPT | Performed by: FAMILY MEDICINE

## 2025-04-13 PROCEDURE — 83735 ASSAY OF MAGNESIUM: CPT | Performed by: EMERGENCY MEDICINE

## 2025-04-13 PROCEDURE — 84100 ASSAY OF PHOSPHORUS: CPT | Performed by: EMERGENCY MEDICINE

## 2025-04-13 PROCEDURE — 99285 EMERGENCY DEPT VISIT HI MDM: CPT

## 2025-04-13 PROCEDURE — 96361 HYDRATE IV INFUSION ADD-ON: CPT

## 2025-04-13 PROCEDURE — 99223 1ST HOSP IP/OBS HIGH 75: CPT

## 2025-04-13 PROCEDURE — 80053 COMPREHEN METABOLIC PANEL: CPT | Performed by: EMERGENCY MEDICINE

## 2025-04-13 PROCEDURE — 84300 ASSAY OF URINE SODIUM: CPT

## 2025-04-13 RX ORDER — POTASSIUM CHLORIDE 14.9 MG/ML
20 INJECTION INTRAVENOUS
Status: DISPENSED | OUTPATIENT
Start: 2025-04-13 | End: 2025-04-13

## 2025-04-13 RX ORDER — HEPARIN SODIUM 5000 [USP'U]/ML
5000 INJECTION, SOLUTION INTRAVENOUS; SUBCUTANEOUS EVERY 8 HOURS SCHEDULED
Status: DISCONTINUED | OUTPATIENT
Start: 2025-04-13 | End: 2025-04-17 | Stop reason: HOSPADM

## 2025-04-13 RX ORDER — ENOXAPARIN SODIUM 100 MG/ML
40 INJECTION SUBCUTANEOUS DAILY
Status: DISCONTINUED | OUTPATIENT
Start: 2025-04-13 | End: 2025-04-13

## 2025-04-13 RX ORDER — SPIRONOLACTONE 25 MG/1
12.5 TABLET ORAL DAILY
Status: DISCONTINUED | OUTPATIENT
Start: 2025-04-13 | End: 2025-04-13

## 2025-04-13 RX ORDER — ONDANSETRON 2 MG/ML
4 INJECTION INTRAMUSCULAR; INTRAVENOUS EVERY 4 HOURS PRN
Status: DISCONTINUED | OUTPATIENT
Start: 2025-04-13 | End: 2025-04-17 | Stop reason: HOSPADM

## 2025-04-13 RX ORDER — POTASSIUM CHLORIDE 14.9 MG/ML
20 INJECTION INTRAVENOUS
Status: COMPLETED | OUTPATIENT
Start: 2025-04-13 | End: 2025-04-13

## 2025-04-13 RX ORDER — GABAPENTIN 100 MG/1
100 CAPSULE ORAL 3 TIMES DAILY
Status: CANCELLED | OUTPATIENT
Start: 2025-04-13 | End: 2025-11-18

## 2025-04-13 RX ORDER — SODIUM BICARBONATE 650 MG/1
650 TABLET ORAL ONCE
Status: COMPLETED | OUTPATIENT
Start: 2025-04-13 | End: 2025-04-13

## 2025-04-13 RX ORDER — ERGOCALCIFEROL 1.25 MG/1
50000 CAPSULE, LIQUID FILLED ORAL 2 TIMES WEEKLY
Status: DISCONTINUED | OUTPATIENT
Start: 2025-04-14 | End: 2025-04-13

## 2025-04-13 RX ORDER — ONDANSETRON 4 MG/1
4 TABLET, ORALLY DISINTEGRATING ORAL EVERY 6 HOURS PRN
Status: DISCONTINUED | OUTPATIENT
Start: 2025-04-13 | End: 2025-04-13

## 2025-04-13 RX ORDER — DROPERIDOL 2.5 MG/ML
0.62 INJECTION, SOLUTION INTRAMUSCULAR; INTRAVENOUS ONCE
Status: COMPLETED | OUTPATIENT
Start: 2025-04-13 | End: 2025-04-13

## 2025-04-13 RX ORDER — MECLIZINE HCL 12.5 MG 12.5 MG/1
12.5 TABLET ORAL 3 TIMES DAILY PRN
Status: DISCONTINUED | OUTPATIENT
Start: 2025-04-13 | End: 2025-04-17 | Stop reason: HOSPADM

## 2025-04-13 RX ORDER — ACETAMINOPHEN 10 MG/ML
1000 INJECTION, SOLUTION INTRAVENOUS ONCE
Status: COMPLETED | OUTPATIENT
Start: 2025-04-13 | End: 2025-04-13

## 2025-04-13 RX ORDER — SODIUM CHLORIDE AND POTASSIUM CHLORIDE 150; 900 MG/100ML; MG/100ML
125 INJECTION, SOLUTION INTRAVENOUS CONTINUOUS
Status: DISCONTINUED | OUTPATIENT
Start: 2025-04-13 | End: 2025-04-15

## 2025-04-13 RX ORDER — ACETAMINOPHEN 325 MG/1
650 TABLET ORAL EVERY 6 HOURS PRN
Status: DISCONTINUED | OUTPATIENT
Start: 2025-04-13 | End: 2025-04-17 | Stop reason: HOSPADM

## 2025-04-13 RX ORDER — PANTOPRAZOLE SODIUM 40 MG/10ML
40 INJECTION, POWDER, LYOPHILIZED, FOR SOLUTION INTRAVENOUS ONCE
Status: COMPLETED | OUTPATIENT
Start: 2025-04-13 | End: 2025-04-13

## 2025-04-13 RX ORDER — ESCITALOPRAM OXALATE 20 MG/1
20 TABLET ORAL
Status: DISCONTINUED | OUTPATIENT
Start: 2025-04-13 | End: 2025-04-17 | Stop reason: HOSPADM

## 2025-04-13 RX ADMIN — POTASSIUM CHLORIDE 20 MEQ: 14.9 INJECTION, SOLUTION INTRAVENOUS at 09:54

## 2025-04-13 RX ADMIN — AMITRIPTYLINE HYDROCHLORIDE 75 MG: 25 TABLET, FILM COATED ORAL at 22:11

## 2025-04-13 RX ADMIN — SODIUM CHLORIDE 1000 ML: 0.9 INJECTION, SOLUTION INTRAVENOUS at 08:59

## 2025-04-13 RX ADMIN — ESCITALOPRAM OXALATE 20 MG: 20 TABLET ORAL at 22:11

## 2025-04-13 RX ADMIN — SODIUM CHLORIDE AND POTASSIUM CHLORIDE 125 ML/HR: .9; .15 SOLUTION INTRAVENOUS at 14:17

## 2025-04-13 RX ADMIN — ACETAMINOPHEN 1000 MG: 10 INJECTION INTRAVENOUS at 09:03

## 2025-04-13 RX ADMIN — POTASSIUM CHLORIDE 20 MEQ: 14.9 INJECTION, SOLUTION INTRAVENOUS at 11:02

## 2025-04-13 RX ADMIN — HEPARIN SODIUM 5000 UNITS: 5000 INJECTION INTRAVENOUS; SUBCUTANEOUS at 14:15

## 2025-04-13 RX ADMIN — ONDANSETRON 4 MG: 2 INJECTION INTRAMUSCULAR; INTRAVENOUS at 14:40

## 2025-04-13 RX ADMIN — SODIUM BICARBONATE 650 MG: 650 TABLET ORAL at 22:11

## 2025-04-13 RX ADMIN — PANTOPRAZOLE SODIUM 40 MG: 40 INJECTION, POWDER, FOR SOLUTION INTRAVENOUS at 14:47

## 2025-04-13 RX ADMIN — POTASSIUM CHLORIDE 20 MEQ: 14.9 INJECTION, SOLUTION INTRAVENOUS at 14:15

## 2025-04-13 RX ADMIN — DROPERIDOL 0.62 MG: 2.5 INJECTION, SOLUTION INTRAMUSCULAR; INTRAVENOUS at 08:59

## 2025-04-13 NOTE — H&P
H&P - Hospitalist   Name: Brittani Patino 40 y.o. female I MRN: [id number]  Unit/Bed#: 422-01 I Date of Admission: 4/13/2025   Date of Service: 4/13/2025 I Hospital Day: 0     Assessment & Plan  Weakness  Generalized weakness for past 2-3 days.   Patient was on Augmentin and medrol dose pack for laryngitis and didn't tolerate PO diet at home in last few days. Multiple nausea and vomiting episodes. Notices dysuria on 04/13  Fatigue likely 2/2 recent dehydration and URI or UTI  In ED, found to have electrolyte displacement, potassium 2.5 and sodium 131    - check BMP in afternoon; replete potassium and sodium as needed   - see hypokalemia A/P  - f/u UA, Ucx  - continue clear liquid diet  - IV zofran prn for nausea, add protonix  Hypokalemia  Presented with potassium 2.5 (baseline ~4.0)  Replete with total IV potassium 60mEq, currently on IV fluids w/ potassium 20mEq    - on telemetry  - check BMP in afternoon  - replete potassium as needed  - hold home med aldactone 12.5mg daily  - check morning am lab  TICO (acute kidney injury) (HCC)  Cr elevated 2.16, baseline around 1 to 1.1  - continue IV fluids  - check urine studies  - avoid nephrotoxic agents  - nephro consult placed  Increased anion gap metabolic acidosis  Presented with AG16. Hx of elevated AG 16 at outpatient setting  Could be starvation ketosis, patient has minimal oral intake in past 2-3 days, with multiple vomiting episodes  - Check VBG  - check lactic acid  - monitor am labs  Hx of gastric bypass  History of gastric bypass in 2014, w/ L chest port for infusion  Recurrent electrolyte displacement for many years.   On daily potassium infusion, monthly vitamin B12 injection and weekly vitamin D 40365W oral supplements  - Avoid NSAIDs   - f/u GI outpatient   Stage 3 chronic kidney disease (HCC)  Lab Results   Component Value Date    EGFR 27 04/13/2025    EGFR 54 04/10/2025    EGFR 66 04/07/2025    CREATININE 2.16 (H) 04/13/2025    CREATININE 1.24 04/10/2025     CREATININE 1.05 04/07/2025   See TICO A/P    Migraine  Continue home med amitriptyline 75mg daily  Hx of anxiety disorder  Continue home med lexapro 20mg daily  Hyponatremia  Presented with low sodium 131  Patient reported not eating much due to laryngitis  - start IV fluids in ED  - continue monitor am labs      VTE Pharmacologic Prophylaxis:   Moderate Risk (Score 3-4) - Pharmacological DVT Prophylaxis Ordered: heparin.  Code Status: Level 1 - Full Code   Discussion with family: Patient declined call to .     Anticipated Length of Stay: Patient will be admitted on an inpatient basis with an anticipated length of stay of greater than 2 midnights secondary to generalized weakness 2/2 electrolyte displacement and TICO requiring IV electrolyte replacement and fluids.    History of Present Illness   Chief Complaint: generalized fatigue and N/V    Brittani Patino is a 40 y.o. female with a PMH of CKD stage 3, gastric bypass, recurrently hypokalemia, chronic anemia, migraine, and Vit B12 and D deficiency who presents with generalized fatigue with N/V for the past 2-3 days. Patient has laryngitis for the past 2-3 days, and she was on augmentin and medrol dose pack for the past few days. She reported not tolerating oral diet at home due to nausea and vomiting. She has regular bowel movement but notices dysuria this morning. She requires daily potassium injections at home. She denied fever, chills, CP, SOB, palpitations, abdominal pain, lower leg swelling.    In ED, her potassium was 2.5, sodium 131, Cr elevated to 2.16. Leukocytosis 13.74 likely due to recent steroid dosage for URI. Her anion gap 16 and beta-hydroxybutyrate 0.28 were elevated as well likely due to poor po intake. CXR unremarkable and CT abdomen pending. She was given 60mEq of IV potassium and started in IV fluids. Thus, patient is admitted for monitoring and treatment of electrolyte displacement and TICO.     Review of Systems   Constitutional:   "Positive for fatigue. Negative for chills and fever.   HENT:  Negative for congestion.    Eyes:  Negative for visual disturbance.   Respiratory:  Negative for cough and shortness of breath.    Cardiovascular:  Negative for chest pain and palpitations.   Gastrointestinal:  Positive for nausea. Negative for abdominal pain, blood in stool, constipation and vomiting.   Genitourinary:  Positive for dysuria. Negative for hematuria.   Musculoskeletal:  Negative for arthralgias and back pain.   Skin:  Negative for color change and rash.   Neurological:  Negative for dizziness, numbness and headaches.   Psychiatric/Behavioral:  Negative for confusion.    All other systems reviewed and are negative.      Historical Information   Past Medical History:   Diagnosis Date    C. difficile colitis 2016    COVID-19     2022- pt denies long covid    DUB (dysfunctional uterine bleeding)     H. pylori infection     Hypertension     Hypokalemia     Kidney stone 2023    Left lower quadrant abdominal pain 2020    Migraine     WAGNER (obstructive sleep apnea) 2014    Pancreatitis     Psychiatric disorder     Anxiety    Rectal bleeding     Renal disorder     Rhabdomyolysis     Sleep apnea     resolved with bariatric surgery    Thrombosed external hemorrhoid      Past Surgical History:   Procedure Laterality Date    ABDOMINAL SURGERY      APPENDECTOMY       SECTION      CHOLECYSTECTOMY      COSMETIC SURGERY      \"tummy tuck\"    FL GUIDED CENTRAL VENOUS ACCESS DEVICE INSERTION  2018    FL GUIDED CENTRAL VENOUS ACCESS DEVICE INSERTION  2024    FL RETROGRADE PYELOGRAM  1/3/2023    GASTRIC BYPASS      HYSTERECTOMY      LAPAROSCOPY      RI ANRCT XM SURG REQ ANES GENERAL SPI/EDRL DX N/A 2021    Procedure: ANAL EXAM UNDER ANESTHESIA; HEMORRHOIDECTOMY;  Surgeon: Dona Jeffries DO;  Location:  MAIN OR;  Service: General    RI CYSTO/URETERO W/LITHOTRIPSY &INDWELL STENT INSRT Left 1/3/2023    " Procedure: CYSTOSCOPY URETEROSCOPY WITH RETROGRADE PYELOGRAM, BASKET STONE EXTRACTION AND INSERTION STENT URETERAL;  Surgeon: Geovanny Rosales MD;  Location: [facility];  Service: Urology    KS EXC THROMBOSED HEMORRHOID XTRNL N/A 7/8/2022    Procedure: EXCISION OF THROMBOSED EXTERNAL HEMORRHOID, Excision of perianal skin tag, dranage of perianal abscess, anal fistulatomy;  Surgeon: Dona Jeffries DO;  Location: [facility];  Service: General    KS INSJ TUNNELED CTR VAD W/SUBQ PORT AGE 4 YR/> Left 2/9/2024    Procedure: INSERTION VENOUS PORT (PORT-A-CATH);  Surgeon: Ryan Singh DO;  Location: [facility];  Service: General    KS RMVL AMIRAH CTR VAD W/SUBQ PORT/ CTR/PRPH INSJ Right 2/9/2024    Procedure: REMOVAL VENOUS PORT (PORT-A-CATH);  Surgeon: Ryan Singh DO;  Location: [facility];  Service: General    TUNNELED VENOUS PORT PLACEMENT N/A 12/21/2018    Procedure: INSERTION VENOUS PORT (PORT-A-CATH);  Surgeon: Ryan Singh DO;  Location: [facility];  Service: General     Social History     Tobacco Use    Smoking status: Never     Passive exposure: Never    Smokeless tobacco: Never   Vaping Use    Vaping status: Never Used   Substance and Sexual Activity    Alcohol use: Not Currently    Drug use: Not Currently     Types: Marijuana     Comment: Medical marijuana    Sexual activity: Yes     Partners: Male     Birth control/protection: Surgical     E-Cigarette/Vaping    E-Cigarette Use Never User      E-Cigarette/Vaping Substances    Nicotine No     THC No     CBD No     Flavoring No     Other No     Unknown No      Family History   Problem Relation Age of Onset    No Known Problems Mother     Hypertension Father     Diabetes Father     Urolithiasis Father     Prostate cancer Father     Diabetes Maternal Grandfather      Social History:  Marital Status: /Civil Union   Occupation: unknown  Patient Pre-hospital Living Situation: Home  Patient Pre-hospital Level of Mobility: walks  Patient  Pre-hospital Diet Restrictions: none    Meds/Allergies   I have reviewed home medications with patient personally.  Prior to Admission medications    Medication Sig Start Date End Date Taking? Authorizing Provider   amitriptyline (ELAVIL) 50 mg tablet Take 1.5 tablets (75 mg total) by mouth daily at bedtime 3/13/25   ARIADNA Gtz   amoxicillin-clavulanate (AUGMENTIN) 500-125 mg per tablet take 1 tab(s) orally every 8 hours for 7 days 4/3/25   Historical Provider, MD   cyanocobalamin 1,000 mcg/mL Inject 1 mL (1,000 mcg total) into a muscle every 30 (thirty) days Next dose due 5/1/2023 4/18/23   Pallavi Argueta MD   dexamethasone (DECADRON) 4 mg tablet Take 8 mg qam x 1 day, take 4 mg qam x 1 day, then take 2 mg qam x 4 days then stop 2/14/25   ARIADNA Gtz   EPINEPHrine (EPIPEN) 0.3 mg/0.3 mL SOAJ Inject 0.3 mg into a muscle 7/5/24   Historical Provider, MD   ergocalciferol (ERGOCALCIFEROL) 1.25 MG (59404 UT) capsule Take 1 capsule (50,000 Units total) by mouth 2 (two) times a week Mondays and Thursdays 2/26/24   Juan Carlos Ricketts DO   escitalopram (LEXAPRO) 20 mg tablet 20 mg daily at bedtime 3/14/23   Historical Provider, MD   gabapentin (NEURONTIN) 100 mg capsule Take 100 mg by mouth Three times a day 11/18/24 11/18/25  Historical Provider, MD   Galcanezumab-gnlm 120 MG/ML SOAJ Inject 120 mg under the skin every 30 (thirty) days 12/13/24   ARIADNA Gtz   meclizine (ANTIVERT) 12.5 MG tablet Take 1 tablet (12.5 mg total) by mouth 3 (three) times a day as needed for dizziness Do not take daily 1/7/25   ARIADNA Gtz   methylPREDNISolone 4 MG tablet therapy pack Use as directed on package 4/10/25   Simeon Valera PA-C   ondansetron (ZOFRAN-ODT) 4 mg disintegrating tablet Take 4 mg by mouth every 6 (six) hours as needed for nausea or vomiting 6/12/23   Historical Provider, MD   oxyCODONE (ROXICODONE) 5 immediate release tablet Take 1 tablet (5 mg total) by  "mouth every 8 (eight) hours as needed for severe pain for up to 7 doses Max Daily Amount: 15 mg 3/11/25   Melissa Rangel DO   potassium chloride 20 MEQ/50ML Inject 200 mL (80 mEq total) into a catheter in a vein daily 80 meq in 1 litre bag to be infused daily over 8 hours. Continue as ordered prior to admission 8/26/24   Juan Carlos Ricketts DO   spironolactone (ALDACTONE) 25 mg tablet Take 0.5 tablets (12.5 mg total) by mouth daily 1/11/25   Korina Acosta PA-C   SUMAtriptan Succinate 6 MG/0.5ML SOAJ Inject 0.5 mL (6 mg total) under the skin as needed (migraine) May repeat once in 1 hour if needed. Max 12 mg/day, Max 3 days per week 1/7/25   ARIADNA Gtz   Syringe/Needle, Disp, (SYRINGE 3CC/25GX5/8\") 25G X 5/8\" 3 ML MISC Use once monthly for B12 injection. 6/1/20   Juan Carlos Ricketts DO     Allergies   Allergen Reactions    Other Anaphylaxis     Jelly fish    Nsaids Other (See Comments)     Other reaction(s): Other (Please comment)  Should not take s/p bariatric surgery  Other reaction(s): Other (See Comments)  Should not take as per prior records  Should not take s/p bariatric surgery  Has hx of gastric bypass      Prednisone      Nausea, vomiting, diarrhea       Objective :  Temp:  [97.1 °F (36.2 °C)-97.7 °F (36.5 °C)] 97.7 °F (36.5 °C)  HR:  [] 81  BP: (126-145)/(62-82) 145/82  Resp:  [16-20] 16  SpO2:  [97 %-100 %] 98 %  O2 Device: None (Room air)    Physical Exam  Vitals and nursing note reviewed.   Constitutional:       General: She is not in acute distress.     Appearance: Normal appearance. She is well-developed. She is ill-appearing.   HENT:      Head: Normocephalic and atraumatic.      Right Ear: External ear normal.      Left Ear: External ear normal.      Nose: Nose normal. No congestion.      Mouth/Throat:      Mouth: Mucous membranes are moist.      Pharynx: Oropharynx is clear. No oropharyngeal exudate or posterior oropharyngeal erythema.   Eyes:      General:         Right " eye: No discharge.         Left eye: No discharge.      Extraocular Movements: Extraocular movements intact.      Conjunctiva/sclera: Conjunctivae normal.   Cardiovascular:      Rate and Rhythm: Normal rate and regular rhythm.      Pulses: Normal pulses.      Heart sounds: Normal heart sounds. No murmur heard.  Pulmonary:      Effort: Pulmonary effort is normal. No respiratory distress.      Breath sounds: Normal breath sounds. No wheezing.   Abdominal:      General: Abdomen is flat. Bowel sounds are normal. There is no distension.      Palpations: Abdomen is soft.      Tenderness: There is no abdominal tenderness. There is no guarding or rebound.   Musculoskeletal:         General: No swelling. Normal range of motion.      Cervical back: Normal range of motion and neck supple.      Right lower leg: No edema.      Left lower leg: No edema.   Skin:     General: Skin is warm and dry.      Capillary Refill: Capillary refill takes less than 2 seconds.      Findings: No rash.   Neurological:      General: No focal deficit present.      Mental Status: She is alert and oriented to person, place, and time.   Psychiatric:         Mood and Affect: Mood normal.          Lines/Drains:  Lines/Drains/Airways       Active Status       Name Placement date Placement time Site Days    Port A Cath 02/09/24 Left Chest 02/09/24  1528  Chest  428                    Central Line:  Goal for removal:  N/A, chronic port a cath for infusion             Lab Results: I have reviewed the following results:  Results from last 7 days   Lab Units 04/13/25  0858   WBC Thousand/uL 13.74*   HEMOGLOBIN g/dL 14.0   HEMATOCRIT % 43.2   PLATELETS Thousands/uL 449*   SEGS PCT % 83*   LYMPHO PCT % 9*   MONO PCT % 8   EOS PCT % 0     Results from last 7 days   Lab Units 04/13/25  0858   SODIUM mmol/L 131*   POTASSIUM mmol/L 2.5*   CHLORIDE mmol/L 96   CO2 mmol/L 19*   BUN mg/dL 39*   CREATININE mg/dL 2.16*   ANION GAP mmol/L 16*   CALCIUM mg/dL 9.1   ALBUMIN  g/dL 4.9   TOTAL BILIRUBIN mg/dL 0.44   ALK PHOS U/L 61   ALT U/L 17   AST U/L 18   GLUCOSE RANDOM mg/dL 157*         Results from last 7 days   Lab Units 04/13/25  0907   POC GLUCOSE mg/dl 146*     Lab Results   Component Value Date    HGBA1C 5.6 01/07/2025    HGBA1C 5.2 02/19/2021    HGBA1C 5.4 10/04/2018           Imaging Results Review: I reviewed radiology reports from this admission including: chest xray and CT abdomen/pelvis.  Other Study Results Review: EKG was reviewed.     Administrative Statements   I have spent a total time of 30 minutes in caring for this patient on the day of the visit/encounter including Impressions, Counseling / Coordination of care, Documenting in the medical record, Reviewing/placing orders in the medical record (including tests, medications, and/or procedures), Obtaining or reviewing history  , and Communicating with other healthcare professionals .    ** Please Note: This note has been constructed using a voice recognition system. **

## 2025-04-13 NOTE — ED PROVIDER NOTES
Time reflects when diagnosis was documented in both MDM as applicable and the Disposition within this note       Time User Action Codes Description Comment    4/13/2025 12:07 PM Khushi Garcia Add [E87.29] High anion gap metabolic acidosis     4/13/2025 12:11 PM Binu Mirza Add [E87.6] Hypokalemia     4/13/2025 12:11 PM CourBinu marie Modify [E87.29] High anion gap metabolic acidosis     4/13/2025 12:11 PM Binu Mirza Modify [E87.6] Hypokalemia     4/13/2025 12:11 PM Binu Mirza Add [R53.1] Weakness     4/13/2025 12:22 PM Mallory Gonsalez Add [N17.9] TICO (acute kidney injury) (HCC)           ED Disposition       ED Disposition   Admit    Condition   Stable    Date/Time   Sun Apr 13, 2025 12:11 PM    Comment   Case was discussed with NASRA and the patient's admission status was agreed to be Admission Status: inpatient status to the service of Dr. Garcia .               Assessment & Plan       Medical Decision Making  40-year-old female with extensive past medical history including gastric bypass, pancreatitis as well as port placement for nightly potassium infusions presents to the emergency department with complaint of nausea and vomiting.  Patient seen and examined, differential diagnosis includes but is not limited to dehydration, hypothyroid Hydrocyn, electrolyte abnormality, renal dysfunction, hepatic dysfunction, influenza, COVID-19, anemia.    Laboratory analysis notable for elevated BHB concerning for possible dehydration, sodium mildly decreased at 131 as well however significant hypokalemia of 2.5 when compared to previous 3 days ago.  Elevated creatinine meeting TICO standards.  Provided patient with below medications as well as as electrolyte repletion.    Due to constellation of symptoms and presentation discussed with internal medicine service who accepted patient for further evaluation and management.    Amount and/or Complexity of Data Reviewed  Labs: ordered. Decision-making details  documented in ED Course.    Risk  Prescription drug management.  Decision regarding hospitalization.        ED Course as of 04/13/25 1348   Sun Apr 13, 2025   0910 WBC(!): 13.74  Mild leukocytosis   0911 Mild increase in concentration and of WBC, hemoglobin, as well as platelets as compared to 3 months ago, possibly suggesting dehydration.   0911 POC Glucose(!): 146   0926 Sodium(!): 131   0926 Potassium(!): 2.5   0926 Creatinine(!): 2.16  TICO   0928 LIPASE: 40   0941 Phosphorus(!): 5.1   0944 TSH 3RD GENERATON(!): 5.153   0958 Beta- Hydroxybutyrate(!): 0.28   1001 ANION GAP(!): 16  Likely explained by patient's vomiting/dehydration and elevated BHB       Medications   sodium chloride 0.9 % with KCl 20 mEq/L infusion (premix) (has no administration in time range)   potassium chloride 20 mEq IVPB (premix) (has no administration in time range)   droperidol (INAPSINE) injection 0.625 mg (0.625 mg Intravenous Given 4/13/25 0859)   sodium chloride 0.9 % bolus 1,000 mL (0 mL Intravenous Stopped 4/13/25 1216)   acetaminophen (Ofirmev) injection 1,000 mg (0 mg Intravenous Stopped 4/13/25 0918)   potassium chloride 20 mEq IVPB (premix) (0 mEq Intravenous Stopped 4/13/25 1216)       ED Risk Strat Scores                    No data recorded        SBIRT 22yo+      Flowsheet Row Most Recent Value   Initial Alcohol Screen: US AUDIT-C     1. How often do you have a drink containing alcohol? 0 Filed at: 04/13/2025 0849   2. How many drinks containing alcohol do you have on a typical day you are drinking?  0 Filed at: 04/13/2025 0849   3a. Male UNDER 65: How often do you have five or more drinks on one occasion? 0 Filed at: 04/13/2025 0849   3b. FEMALE Any Age, or MALE 65+: How often do you have 4 or more drinks on one occassion? 0 Filed at: 04/13/2025 0849   Audit-C Score 0 Filed at: 04/13/2025 0849   SLIME: How many times in the past year have you...    Used an illegal drug or used a prescription medication for non-medical reasons?  "Never Filed at: 2025 0849                            History of Present Illness       Chief Complaint   Patient presents with    Weakness - Generalized     Patient reports generalized weakness/pain, fatigue, and vomiting. Patient gets daily K infusions.         Past Medical History:   Diagnosis Date    C. difficile colitis 2016    COVID-19     2022- pt denies long covid    DUB (dysfunctional uterine bleeding)     H. pylori infection     Hypertension     Hypokalemia     Kidney stone 2023    Left lower quadrant abdominal pain 2020    Migraine     WAGNER (obstructive sleep apnea) 2014    Pancreatitis     Psychiatric disorder     Anxiety    Rectal bleeding     Renal disorder     Rhabdomyolysis     Sleep apnea     resolved with bariatric surgery    Thrombosed external hemorrhoid       Past Surgical History:   Procedure Laterality Date    ABDOMINAL SURGERY      APPENDECTOMY       SECTION      CHOLECYSTECTOMY      COSMETIC SURGERY      \"tummy tuck\"    FL GUIDED CENTRAL VENOUS ACCESS DEVICE INSERTION  2018    FL GUIDED CENTRAL VENOUS ACCESS DEVICE INSERTION  2024    FL RETROGRADE PYELOGRAM  1/3/2023    GASTRIC BYPASS      HYSTERECTOMY      LAPAROSCOPY      NE ANRCT XM SURG REQ ANES GENERAL SPI/EDRL DX N/A 2021    Procedure: ANAL EXAM UNDER ANESTHESIA; HEMORRHOIDECTOMY;  Surgeon: Dona Jeffries DO;  Location: [facility];  Service: General    NE CYSTO/URETERO W/LITHOTRIPSY &INDWELL STENT INSRT Left 1/3/2023    Procedure: CYSTOSCOPY URETEROSCOPY WITH RETROGRADE PYELOGRAM, BASKET STONE EXTRACTION AND INSERTION STENT URETERAL;  Surgeon: Geovanny Rosales MD;  Location: [facility];  Service: Urology    NE EXC THROMBOSED HEMORRHOID XTRNL N/A 2022    Procedure: EXCISION OF THROMBOSED EXTERNAL HEMORRHOID, Excision of perianal skin tag, dranage of perianal abscess, anal fistulatomy;  Surgeon: Dona Jeffries DO;  Location: [facility];  Service: General    NE " INSJ TUNNELED CTR VAD W/SUBQ PORT AGE 5 YR/> Left 2/9/2024    Procedure: INSERTION VENOUS PORT (PORT-A-CATH);  Surgeon: Ryan Singh DO;  Location: [facility];  Service: General    NC RMVL AMIRAH CTR VAD W/SUBQ PORT/ CTR/PRPH INSJ Right 2/9/2024    Procedure: REMOVAL VENOUS PORT (PORT-A-CATH);  Surgeon: Ryan Singh DO;  Location: [facility];  Service: General    TUNNELED VENOUS PORT PLACEMENT N/A 12/21/2018    Procedure: INSERTION VENOUS PORT (PORT-A-CATH);  Surgeon: Ryan Singh DO;  Location: [facility];  Service: General      Family History   Problem Relation Age of Onset    No Known Problems Mother     Hypertension Father     Diabetes Father     Urolithiasis Father     Prostate cancer Father     Diabetes Maternal Grandfather       Social History     Tobacco Use    Smoking status: Never     Passive exposure: Never    Smokeless tobacco: Never   Vaping Use    Vaping status: Never Used   Substance Use Topics    Alcohol use: Not Currently    Drug use: Not Currently     Types: Marijuana     Comment: Medical marijuana      E-Cigarette/Vaping    E-Cigarette Use Never User       E-Cigarette/Vaping Substances    Nicotine No     THC No     CBD No     Flavoring No     Other No     Unknown No       I have reviewed and agree with the history as documented.     (Brittani Patino) Brittani Patino is a 40 y.o. female     They presented to the emergency department on April 13, 2025. Patient presents with:  Weakness - Generalized: Patient reports generalized weakness/pain, fatigue, and vomiting. Patient gets daily K infusions.        The patient states that she has an extensive past medical history including gastric bypass, multiple electrolyte deficiencies, pancreatitis, PNES anemia, CKD was recently diagnosed with laryngitis after developing hoarse voice as well as sore throat.  Patient notes that she has a port, and receives nightly potassium infusions, has not missed any however has also noted that during the past 3 days she  has been experiencing extreme nausea as well as vomiting.  Patient states that she has been having trouble keeping anything p.o both solids as well as fluids, and believes due to that she has been having generalized weakness.  Patient also notes that she has been experiencing aches and fatigue as well as an episode of diarrhea.  Patient denies any known sick contacts.  Patient states that she did have a fever yesterday, however did not take her temperature.  Patient denies chest pain, redness of breath, abdominal pain, change in urination, or any other complaint at this time.              Review of Systems   Constitutional:  Positive for fatigue and fever. Negative for chills.   HENT:  Positive for sore throat. Negative for ear pain.    Eyes:  Negative for pain and visual disturbance.   Respiratory:  Negative for cough and shortness of breath.    Cardiovascular:  Negative for chest pain and palpitations.   Gastrointestinal:  Positive for diarrhea, nausea and vomiting. Negative for abdominal pain.   Genitourinary:  Positive for dysuria. Negative for flank pain, frequency, hematuria and urgency.   Musculoskeletal:  Positive for myalgias. Negative for arthralgias and back pain.   Skin:  Negative for color change and rash.   Neurological:  Positive for weakness (Global). Negative for seizures and syncope.   All other systems reviewed and are negative.          Objective       ED Triage Vitals [04/13/25 0849]   Temperature Pulse Blood Pressure Respirations SpO2 Patient Position - Orthostatic VS   (!) 97.1 °F (36.2 °C) 105 126/66 20 100 % Lying      Temp Source Heart Rate Source BP Location FiO2 (%) Pain Score    Temporal Monitor Left arm -- 6      Vitals      Date and Time Temp Pulse SpO2 Resp BP Pain Score FACES Pain Rating User   04/13/25 1300 97.7 °F (36.5 °C) 81 98 % 16 145/82 -- -- HL   04/13/25 1258 97.7 °F (36.5 °C) 81 97 % -- 145/82 -- -- DII   04/13/25 1258 -- -- -- -- -- No Pain -- HL   04/13/25 1200 -- 84 100  % 16 129/64 3 -- PP   04/13/25 1000 -- 83 99 % 18 136/62 -- -- PP   04/13/25 0930 -- 85 100 % 20 129/62 -- -- PP   04/13/25 0911 -- -- -- -- -- 6 -- PP   04/13/25 0849 97.1 °F (36.2 °C) 105 100 % 20 126/66 6 -- PP            Physical Exam  Vitals and nursing note reviewed.   Constitutional:       General: She is in acute distress (Mild).      Appearance: Normal appearance.   HENT:      Head: Normocephalic and atraumatic.      Right Ear: External ear normal.      Left Ear: External ear normal.      Nose: Nose normal.      Mouth/Throat:      Mouth: Mucous membranes are moist.   Eyes:      Conjunctiva/sclera: Conjunctivae normal.   Cardiovascular:      Rate and Rhythm: Regular rhythm. Tachycardia present.   Pulmonary:      Effort: Pulmonary effort is normal. No respiratory distress.      Breath sounds: Normal breath sounds.   Abdominal:      General: Abdomen is flat. Bowel sounds are normal.      Tenderness: There is no abdominal tenderness. There is no guarding or rebound.   Musculoskeletal:         General: Normal range of motion.      Cervical back: Normal range of motion.   Skin:     General: Skin is warm and dry.      Capillary Refill: Capillary refill takes less than 2 seconds.   Neurological:      Mental Status: She is alert. Mental status is at baseline.   Psychiatric:         Mood and Affect: Mood normal.         Results Reviewed       Procedure Component Value Units Date/Time    Blood gas, venous [[id number]]     Lab Status: No result Specimen: Blood     Urea nitrogen, urine [[id number]]     Lab Status: No result Specimen: Urine     Platelet count [[id number]]     Lab Status: No result Specimen: Blood     Respiratory Panel 2.1(RP2)with COVID19 [[id number]]     Lab Status: No result Specimen: Nasopharyngeal Swab     Sodium, urine, random [[id number]]     Lab Status: No result Specimen: Urine     Creatinine, urine, random [[id number]]     Lab Status: No result Specimen: Urine     Beta Hydroxybutyrate [[id number]]   (Abnormal) Collected: 04/13/25 0858    Lab Status: Final result Specimen: Blood from Arm, Right Updated: 04/13/25 0957     Beta- Hydroxybutyrate 0.28 mmol/L     TSH, 3rd generation with Free T4 reflex [[id number]]  (Abnormal) Collected: 04/13/25 0858    Lab Status: Final result Specimen: Blood from Arm, Right Updated: 04/13/25 0943     TSH 3RD GENERATON 5.153 uIU/mL     T4, free [[id number]] Collected: 04/13/25 0858    Lab Status: In process Specimen: Blood from Arm, Right Updated: 04/13/25 0943    Phosphorus [[id number]]  (Abnormal) Collected: 04/13/25 0858    Lab Status: Final result Specimen: Blood from Arm, Right Updated: 04/13/25 0941     Phosphorus 5.1 mg/dL     Comprehensive metabolic panel [[id number]]  (Abnormal) Collected: 04/13/25 0858    Lab Status: Final result Specimen: Blood from Arm, Right Updated: 04/13/25 0924     Sodium 131 mmol/L      Potassium 2.5 mmol/L      Chloride 96 mmol/L      CO2 19 mmol/L      ANION GAP 16 mmol/L      BUN 39 mg/dL      Creatinine 2.16 mg/dL      Glucose 157 mg/dL      Calcium 9.1 mg/dL      AST 18 U/L      ALT 17 U/L      Alkaline Phosphatase 61 U/L      Total Protein 8.6 g/dL      Albumin 4.9 g/dL      Total Bilirubin 0.44 mg/dL      eGFR 27 ml/min/1.73sq m     Narrative:      National [employer] guidelines for Chronic Kidney Disease (CKD):     Stage 1 with normal or high GFR (GFR > 90 mL/min/1.73 square meters)    Stage 2 Mild CKD (GFR = 60-89 mL/min/1.73 square meters)    Stage 3A Moderate CKD (GFR = 45-59 mL/min/1.73 square meters)    Stage 3B Moderate CKD (GFR = 30-44 mL/min/1.73 square meters)    Stage 4 Severe CKD (GFR = 15-29 mL/min/1.73 square meters)    Stage 5 End Stage CKD (GFR <15 mL/min/1.73 square meters)  Note: GFR calculation is accurate only with a steady state creatinine    Lipase [[id number]]  (Normal) Collected: 04/13/25 0858    Lab Status: Final result Specimen: Blood from Arm, Right Updated: 04/13/25 0924     Lipase 40 u/L     Magnesium  [[id number]]  (Normal) Collected: 04/13/25 0858    Lab Status: Final result Specimen: Blood from Arm, Right Updated: 04/13/25 0924     Magnesium 2.7 mg/dL     FLU/COVID Rapid Antigen (30 min. TAT) - Preferred screening test in ED [[id number]]  (Normal) Collected: 04/13/25 0858    Lab Status: Final result Specimen: Nares from Nose Updated: 04/13/25 0923     SARS COV Rapid Antigen Negative     Influenza A Rapid Antigen Negative     Influenza B Rapid Antigen Negative    Narrative:      This test has been performed using the [employer] Sammi 2 FLU+SARS Antigen test under the Emergency Use Authorization (EUA). This test has been validated by the  and verified by the performing laboratory. The Sammi uses lateral flow immunofluorescent sandwich assay to detect SARS-COV, Influenza A and Influenza B Antigen.     The [employer] Sammi 2 SARS Antigen test does not differentiate between SARS-CoV and SARS-CoV-2.     Negative results are presumptive and may be confirmed with a molecular assay, if necessary, for patient management. Negative results do not rule out SARS-CoV-2 or influenza infection and should not be used as the sole basis for treatment or patient management decisions. A negative test result may occur if the level of antigen in a sample is below the limit of detection of this test.     Positive results are indicative of the presence of viral antigens, but do not rule out bacterial infection or co-infection with other viruses.     All test results should be used as an adjunct to clinical observations and other information available to the provider.    FOR PEDIATRIC PATIENTS - copy/paste COVID Guidelines URL to browser: [email or web address]    Fingerstick Glucose (POCT) [[id number]]  (Abnormal) Collected: 04/13/25 0907    Lab Status: Final result Specimen: Blood Updated: 04/13/25 0908     POC Glucose 146 mg/dl     CBC and differential [[id number]]  (Abnormal)  "Collected: 04/13/25 0858    Lab Status: Final result Specimen: Blood from Arm, Right Updated: 04/13/25 0907     WBC 13.74 Thousand/uL      RBC 5.58 Million/uL      Hemoglobin 14.0 g/dL      Hematocrit 43.2 %      MCV 77 fL      MCH 25.1 pg      MCHC 32.4 g/dL      RDW 15.3 %      MPV 9.8 fL      Platelets 449 Thousands/uL      nRBC 0 /100 WBCs      Segmented % 83 %      Immature Grans % 0 %      Lymphocytes % 9 %      Monocytes % 8 %      Eosinophils Relative 0 %      Basophils Relative 0 %      Absolute Neutrophils 11.32 Thousands/µL      Absolute Immature Grans 0.05 Thousand/uL      Absolute Lymphocytes 1.27 Thousands/µL      Absolute Monocytes 1.03 Thousand/µL      Eosinophils Absolute 0.02 Thousand/µL      Basophils Absolute 0.05 Thousands/µL     UA w Reflex to Microscopic w Reflex to Culture [[id number]]     Lab Status: No result Specimen: Urine             CT abdomen pelvis wo contrast    (Results Pending)       Procedures    ED Medication and Procedure Management   Prior to Admission Medications   Prescriptions Last Dose Informant Patient Reported? Taking?   EPINEPHrine (EPIPEN) 0.3 mg/0.3 mL SOAJ Unknown Self Yes No   Sig: Inject 0.3 mg into a muscle   Galcanezumab-gnlm 120 MG/ML SOAJ Past Month Self No Yes   Sig: Inject 120 mg under the skin every 30 (thirty) days   SUMAtriptan Succinate 6 MG/0.5ML SOAJ Past Month Self No Yes   Sig: Inject 0.5 mL (6 mg total) under the skin as needed (migraine) May repeat once in 1 hour if needed. Max 12 mg/day, Max 3 days per week   Syringe/Needle, Disp, (SYRINGE 3CC/25GX5/8\") 25G X 5/8\" 3 ML MISC Unknown Self No No   Sig: Use once monthly for B12 injection.   amitriptyline (ELAVIL) 50 mg tablet 4/12/2025 Bedtime  No Yes   Sig: Take 1.5 tablets (75 mg total) by mouth daily at bedtime   amoxicillin-clavulanate (AUGMENTIN) 500-125 mg per tablet 4/10/2025  Yes No   Sig: take 1 tab(s) orally every 8 hours for 7 days   cyanocobalamin 1,000 mcg/mL Past Month Self No Yes   Sig: " Inject 1 mL (1,000 mcg total) into a muscle every 30 (thirty) days Next dose due 2023   dexamethasone (DECADRON) 4 mg tablet  Self No No   Sig: Take 8 mg qam x 1 day, take 4 mg qam x 1 day, then take 2 mg qam x 4 days then stop   ergocalciferol (ERGOCALCIFEROL) 1.25 MG (00772 UT) capsule 4/10/2025 Self No No   Sig: Take 1 capsule (50,000 Units total) by mouth 2 (two) times a week  and    escitalopram (LEXAPRO) 20 mg tablet 2025 Bedtime Self Yes Yes   Si mg daily at bedtime   gabapentin (NEURONTIN) 100 mg capsule More than a month Self Yes No   Sig: Take 100 mg by mouth as needed   meclizine (ANTIVERT) 12.5 MG tablet  Self No Yes   Sig: Take 1 tablet (12.5 mg total) by mouth 3 (three) times a day as needed for dizziness Do not take daily   methylPREDNISolone 4 MG tablet therapy pack Not Taking  No No   Sig: Use as directed on package   Patient not taking: Reported on 2025   ondansetron (ZOFRAN-ODT) 4 mg disintegrating tablet Past Month Self Yes Yes   Sig: Take 4 mg by mouth every 6 (six) hours as needed for nausea or vomiting   oxyCODONE (ROXICODONE) 5 immediate release tablet Not Taking  No No   Sig: Take 1 tablet (5 mg total) by mouth every 8 (eight) hours as needed for severe pain for up to 7 doses Max Daily Amount: 15 mg   Patient not taking: Reported on 2025   potassium chloride 20 MEQ/50ML 2025 Self No Yes   Sig: Inject 200 mL (80 mEq total) into a catheter in a vein daily 80 meq in 1 litre bag to be infused daily over 8 hours. Continue as ordered prior to admission   spironolactone (ALDACTONE) 25 mg tablet 2025 Self No Yes   Sig: Take 0.5 tablets (12.5 mg total) by mouth daily      Facility-Administered Medications: None     Current Discharge Medication List        CONTINUE these medications which have NOT CHANGED    Details   amitriptyline (ELAVIL) 50 mg tablet Take 1.5 tablets (75 mg total) by mouth daily at bedtime  Qty: 135 tablet, Refills: 1    Associated  Diagnoses: Chronic migraine without aura without status migrainosus, not intractable      cyanocobalamin 1,000 mcg/mL Inject 1 mL (1,000 mcg total) into a muscle every 30 (thirty) days Next dose due 5/1/2023    Associated Diagnoses: B12 deficiency      escitalopram (LEXAPRO) 20 mg tablet 20 mg daily at bedtime      Galcanezumab-gnlm 120 MG/ML SOAJ Inject 120 mg under the skin every 30 (thirty) days  Qty: 1 mL, Refills: 11    Associated Diagnoses: Chronic migraine without aura without status migrainosus, not intractable      meclizine (ANTIVERT) 12.5 MG tablet Take 1 tablet (12.5 mg total) by mouth 3 (three) times a day as needed for dizziness Do not take daily  Qty: 30 tablet, Refills: 1    Associated Diagnoses: Vertigo      ondansetron (ZOFRAN-ODT) 4 mg disintegrating tablet Take 4 mg by mouth every 6 (six) hours as needed for nausea or vomiting      potassium chloride 20 MEQ/50ML Inject 200 mL (80 mEq total) into a catheter in a vein daily 80 meq in 1 litre bag to be infused daily over 8 hours. Continue as ordered prior to admission  Qty: 5600 mL, Refills: 11    Associated Diagnoses: Hypokalemia      spironolactone (ALDACTONE) 25 mg tablet Take 0.5 tablets (12.5 mg total) by mouth daily    Associated Diagnoses: Hypokalemia      SUMAtriptan Succinate 6 MG/0.5ML SOAJ Inject 0.5 mL (6 mg total) under the skin as needed (migraine) May repeat once in 1 hour if needed. Max 12 mg/day, Max 3 days per week  Qty: 3 mL, Refills: 1    Associated Diagnoses: Chronic migraine without aura without status migrainosus, not intractable      amoxicillin-clavulanate (AUGMENTIN) 500-125 mg per tablet take 1 tab(s) orally every 8 hours for 7 days      dexamethasone (DECADRON) 4 mg tablet Take 8 mg qam x 1 day, take 4 mg qam x 1 day, then take 2 mg qam x 4 days then stop  Qty: 5 tablet, Refills: 0    Associated Diagnoses: Intractable migraine without aura and without status migrainosus      EPINEPHrine (EPIPEN) 0.3 mg/0.3 mL SOAJ Inject  "0.3 mg into a muscle      ergocalciferol (ERGOCALCIFEROL) 1.25 MG (63561 UT) capsule Take 1 capsule (50,000 Units total) by mouth 2 (two) times a week Mondays and Thursdays  Qty: 24 capsule, Refills: 1    Associated Diagnoses: Vitamin D insufficiency      gabapentin (NEURONTIN) 100 mg capsule Take 100 mg by mouth as needed      methylPREDNISolone 4 MG tablet therapy pack Use as directed on package  Qty: 1 each, Refills: 0    Associated Diagnoses: Chest tightness; Acute laryngopharyngitis      oxyCODONE (ROXICODONE) 5 immediate release tablet Take 1 tablet (5 mg total) by mouth every 8 (eight) hours as needed for severe pain for up to 7 doses Max Daily Amount: 15 mg  Qty: 7 tablet, Refills: 0    Associated Diagnoses: Crush injury of left foot      Syringe/Needle, Disp, (SYRINGE 3CC/25GX5/8\") 25G X 5/8\" 3 ML MISC Use once monthly for B12 injection.  Qty: 1 each, Refills: 11    Associated Diagnoses: B12 deficiency           No discharge procedures on file.  ED SEPSIS DOCUMENTATION   Time reflects when diagnosis was documented in both MDM as applicable and the Disposition within this note       Time User Action Codes Description Comment    4/13/2025 12:07 PM Khushi Garcia Add [E87.29] High anion gap metabolic acidosis     4/13/2025 12:11 PM Binu Mirza Add [E87.6] Hypokalemia     4/13/2025 12:11 PM Binu Mirza Modify [E87.29] High anion gap metabolic acidosis     4/13/2025 12:11 PM Binu Mirza Modify [E87.6] Hypokalemia     4/13/2025 12:11 PM Binu Mirza Add [R53.1] Weakness     4/13/2025 12:22 PM Mallory Gonsalez Add [N17.9] TICO (acute kidney injury) (HCC)            Initial Sepsis Screening       Row Name 04/13/25 0911                Is the patient's history suggestive of a new or worsening infection? No  -TD                  User Key  (r) = Recorded By, (t) = Taken By, (c) = Cosigned By      Initials Name Provider Type    TD Shai Bunn MD Physician                       Shai Bunn, " MD  04/13/25 1236

## 2025-04-13 NOTE — CONSULTS
Administrative Statements   VIRTUAL CARE DOCUMENTATION:     1. This service was provided via Telemedicine using [employer]     2. Parties in the room with patient during teleconsult Patient only    3. Confidentiality My office door was closed     4. Participants No one else was in the room    5. Patient acknowledged consent and understanding of privacy and security of the  Telemedicine consult. I informed the patient that I have reviewed their record in Epic and presented the opportunity for them to ask any questions regarding the visit today.  The patient agreed to participate.    6. I have spent a total time of 45 minutes in caring for this patient on the day of the visit/encounter including Diagnostic results, not including the time spent for establishing the audio/video connection.        Consultation - Nephrology   Brittani Patino 40 y.o. female MRN: [id number]  Unit/Bed#: 422-01 Encounter: [id number]    ASSESSMENT/PLAN:    TICO (POA)  -Baseline creatinine around 1 mg/dL  -Current creatinine 2.16 mg/dL  -Etiology: Most likely due to volume depletion, will follow-up on urine studies and continue with volume expansion  -UA: Pending  -Renal imaging: Pending  -Holding spironolactone for now given acute kidney injury  -Repeat BMP ordered for this afternoon  -Avoid hypotension, avoid nephrotoxins, avoid NSAIDS  -Trend BMP    Hypokalemia  Patient has had instances of hypokalemia in the past that has led to hospitalization.  She is followed by IV nephrologists and has had full workup in the past and requires daily infusions of potassium due to poor oral absorption.  The main etiology of the patient's hypokalemia in the chronic setting is lack of GI absorption along with increased losses via the GI tract/chronic diarrhea from gastric bypass.  She has had minimal urine losses of potassium.  During the past 3 days, she started to feel ill after developing a sore throat and hoarse voice for which she was seen in the ER and  diagnosed with acute laryngeal pharyngitis.  Prior to this, her potassiums have been normal but recently they have been trending down and this acute illness may be the reason why.  On top of having nausea, vomiting and poor p.o. intake this likely threw off her potassium balance.  Currently her potassium level is at 2.5.  Etiology of the acute hypokalemia is most likely due to GI losses from diarrhea along with vomiting which can cause low potassium from other mechanisms.  Current plan is to monitor patient on telemetry given her symptoms of numbness/tingling in her face and chest pressure and provide IV potassium chloride infusions.  Repeat BMP is pending for 6 PM.    Hyponatremia  -Etiology: Most likely hypovolemic hyponatremia  -Workup: Will defer  -Treatment plan: Given the presence of TICO and hypovolemia, continue with infusion of normal saline    Anion gap metabolic acidosis  Can likely be explained by the patient's current acute kidney injury and presence of ketones as BHB was mildly elevated from poor appetite.  Lactic acid level is pending.  Will continue to monitor with volume expansion    HISTORY OF PRESENT ILLNESS:  Requesting Physician: Khushi Garcia MD  Reason for Consult: TICO Patino is a 40 y.o. year old female nephrolithiasis, CKD 3A, gastric bypass, resistant hypokalemia who was admitted to [facility] after presenting with generalized weakness and malaise.  Her symptoms started about 3 to 4 days ago when she started to feel weak and worn out and did not eat anything during this timeframe due to nausea and inability to keep food down.  She reports having a fever the last 2 days and chills.  She was in the urgent care on April 10 and diagnosed with laryngitis and prescribed steroids.  She is still complaining of nausea and poor appetite.  She has been receiving her regularly scheduled overnight potassium infusions.  A renal consultation is requested today for assistance in the management of  "TICO.    PAST MEDICAL HISTORY:  Past Medical History:   Diagnosis Date    C. difficile colitis 2016    COVID-19     2022- pt denies long covid    DUB (dysfunctional uterine bleeding)     H. pylori infection     Hypertension     Hypokalemia     Kidney stone 2023    Left lower quadrant abdominal pain 2020    Migraine     WAGNER (obstructive sleep apnea) 2014    Pancreatitis     Psychiatric disorder     Anxiety    Rectal bleeding     Renal disorder     Rhabdomyolysis     Sleep apnea     resolved with bariatric surgery    Thrombosed external hemorrhoid        PAST SURGICAL HISTORY:  Past Surgical History:   Procedure Laterality Date    ABDOMINAL SURGERY      APPENDECTOMY       SECTION      CHOLECYSTECTOMY      COSMETIC SURGERY      \"tummy tuck\"    FL GUIDED CENTRAL VENOUS ACCESS DEVICE INSERTION  2018    FL GUIDED CENTRAL VENOUS ACCESS DEVICE INSERTION  2024    FL RETROGRADE PYELOGRAM  1/3/2023    GASTRIC BYPASS      HYSTERECTOMY      LAPAROSCOPY      GA ANRCT XM SURG REQ ANES GENERAL SPI/EDRL DX N/A 2021    Procedure: ANAL EXAM UNDER ANESTHESIA; HEMORRHOIDECTOMY;  Surgeon: Dona Jeffries DO;  Location: [facility];  Service: General    GA CYSTO/URETERO W/LITHOTRIPSY &INDWELL STENT INSRT Left 1/3/2023    Procedure: CYSTOSCOPY URETEROSCOPY WITH RETROGRADE PYELOGRAM, BASKET STONE EXTRACTION AND INSERTION STENT URETERAL;  Surgeon: Geovanny Rosales MD;  Location: [facility];  Service: Urology    GA EXC THROMBOSED HEMORRHOID XTRNL N/A 2022    Procedure: EXCISION OF THROMBOSED EXTERNAL HEMORRHOID, Excision of perianal skin tag, dranage of perianal abscess, anal fistulatomy;  Surgeon: Dona Jeffries DO;  Location: [facility];  Service: General    GA INSJ TUNNELED CTR VAD W/SUBQ PORT AGE 5 YR/> Left 2024    Procedure: INSERTION VENOUS PORT (PORT-A-CATH);  Surgeon: Ryan Singh DO;  Location: [facility];  Service: General    GA RMVL AMIRAH CTR VAD W/SUBQ " PORT/ CTR/PRPH INSJ Right 2/9/2024    Procedure: REMOVAL VENOUS PORT (PORT-A-CATH);  Surgeon: Ryan Singh DO;  Location: [facility];  Service: General    TUNNELED VENOUS PORT PLACEMENT N/A 12/21/2018    Procedure: INSERTION VENOUS PORT (PORT-A-CATH);  Surgeon: Ryan Singh DO;  Location: [facility];  Service: General       ALLERGIES:  Allergies   Allergen Reactions    Other Anaphylaxis     Jelly fish    Nsaids Other (See Comments)     Other reaction(s): Other (Please comment)  Should not take s/p bariatric surgery  Other reaction(s): Other (See Comments)  Should not take as per prior records  Should not take s/p bariatric surgery  Has hx of gastric bypass      Prednisone      Nausea, vomiting, diarrhea       SOCIAL HISTORY:  Social History     Substance and Sexual Activity   Alcohol Use Not Currently     Social History     Substance and Sexual Activity   Drug Use Not Currently    Types: Marijuana    Comment: Medical marijuana     Social History     Tobacco Use   Smoking Status Never    Passive exposure: Never   Smokeless Tobacco Never       FAMILY HISTORY:  Family History   Problem Relation Age of Onset    No Known Problems Mother     Hypertension Father     Diabetes Father     Urolithiasis Father     Prostate cancer Father     Diabetes Maternal Grandfather        MEDICATIONS:    Current Facility-Administered Medications:     acetaminophen (TYLENOL) tablet 650 mg, 650 mg, Oral, Q6H PRN, Mallory Gonsalez, DO    amitriptyline (ELAVIL) tablet 75 mg, 75 mg, Oral, HS, Mallory Gonsalez, DO    escitalopram (LEXAPRO) tablet 20 mg, 20 mg, Oral, HS, Mallory Gonsalez, DO    heparin (porcine) subcutaneous injection 5,000 Units, 5,000 Units, Subcutaneous, Q8H MIKE, Khushi Garcia MD    meclizine (ANTIVERT) tablet 12.5 mg, 12.5 mg, Oral, TID PRN, Mallory Gonsalez DO    ondansetron (ZOFRAN) injection 4 mg, 4 mg, Intravenous, Q4H PRN, Khushi Garcia MD    pantoprazole (PROTONIX) injection 40 mg, 40 mg, Intravenous, Once, Khushi Garcia MD     potassium chloride 20 mEq IVPB (premix), 20 mEq, Intravenous, Q1H, Khushi Garcia MD    sodium chloride 0.9 % with KCl 20 mEq/L infusion (premix), 125 mL/hr, Intravenous, Continuous, Mallory Gonsalez DO    REVIEW OF SYSTEMS:  Review of Systems   Constitutional:  Positive for appetite change, chills and fever.   Respiratory:  Positive for chest tightness. Negative for cough and shortness of breath.    Cardiovascular:  Negative for chest pain and leg swelling.   Gastrointestinal:  Positive for diarrhea, nausea and vomiting.   Genitourinary:  Positive for dysuria. Negative for hematuria.   Neurological:  Positive for numbness. Negative for headaches.      A complete 10 point review of systems was performed and found to be negative unless otherwise noted above or in the HPI.    PHYSICAL EXAM:  Current Weight: Weight - Scale: 56.9 kg (125 lb 7.1 oz)  First Weight: Weight - Scale: 56.9 kg (125 lb 7.1 oz)  Vitals:    04/13/25 1000 04/13/25 1200 04/13/25 1258 04/13/25 1300   BP: 136/62 129/64 145/82 145/82   BP Location: Left arm Left arm  Right arm   Pulse: 83 84 81 81   Resp: 18 16  16   Temp:   97.7 °F (36.5 °C) 97.7 °F (36.5 °C)   TempSrc:    Oral   SpO2: 99% 100% 97% 98%   Weight:       Height:           Intake/Output Summary (Last 24 hours) at 4/13/2025 1303  Last data filed at 4/13/2025 1216  Gross per 24 hour   Intake 1300 ml   Output --   Net 1300 ml     Physical Exam  Vitals and nursing note reviewed.   Constitutional:       General: She is not in acute distress.     Appearance: She is well-developed.   HENT:      Head: Normocephalic and atraumatic.   Cardiovascular:      Rate and Rhythm: Normal rate and regular rhythm.      Heart sounds: No murmur heard.  Pulmonary:      Effort: Pulmonary effort is normal. No respiratory distress.      Breath sounds: Normal breath sounds.   Abdominal:      Palpations: Abdomen is soft.      Tenderness: There is no abdominal tenderness.   Musculoskeletal:         General: No swelling.    Skin:     General: Skin is warm and dry.      Capillary Refill: Capillary refill takes less than 2 seconds.   Neurological:      Mental Status: She is alert.   Psychiatric:         Mood and Affect: Mood normal.          Invasive Devices:      Lab Results:   Results from last 7 days   Lab Units 04/13/25  0858 04/10/25  0843 04/07/25  0801   WBC Thousand/uL 13.74*  --   --    HEMOGLOBIN g/dL 14.0  --   --    HEMATOCRIT % 43.2  --   --    PLATELETS Thousands/uL 449*  --   --    POTASSIUM mmol/L 2.5* 3.2* 3.3*   CHLORIDE mmol/L 96 101 107   CO2 mmol/L 19* 22 21   BUN mg/dL 39* 16 14   CREATININE mg/dL 2.16* 1.24 1.05   CALCIUM mg/dL 9.1 9.1 8.5   MAGNESIUM mg/dL 2.7  --   --    PHOSPHORUS mg/dL 5.1*  --   --    ALK PHOS U/L 61  --   --    ALT U/L 17  --   --    AST U/L 18  --   --        I have personally reviewed the blood work as stated above and in my note.  I have personally reviewed IM note.

## 2025-04-13 NOTE — ASSESSMENT & PLAN NOTE
Lab Results   Component Value Date    EGFR 27 04/13/2025    EGFR 54 04/10/2025    EGFR 66 04/07/2025    CREATININE 2.16 (H) 04/13/2025    CREATININE 1.24 04/10/2025    CREATININE 1.05 04/07/2025   See TICO A/P

## 2025-04-13 NOTE — ASSESSMENT & PLAN NOTE
Presented with low sodium 131  Patient reported not eating much due to laryngitis  - start IV fluids in ED  - continue monitor am labs

## 2025-04-13 NOTE — ED NOTES
Patient arrived with port accessed. She states that she did K infusion overnight. Line was clamped on arrival. Flushed with good blood return. Per provider, we are able to use the port that was already accessed outside of department.      Teri Maddox RN  04/13/25 9970

## 2025-04-13 NOTE — ASSESSMENT & PLAN NOTE
Cr elevated 2.16, baseline around 1 to 1.1  - continue IV fluids  - check urine studies  - avoid nephrotoxic agents  - nephro consult placed

## 2025-04-13 NOTE — PLAN OF CARE
Problem: PAIN - ADULT  Goal: Verbalizes/displays adequate comfort level or baseline comfort level  Description: Interventions:- Encourage patient to monitor pain and request assistance- Assess pain using appropriate pain scale- Administer analgesics based on type and severity of pain and evaluate response- Implement non-pharmacological measures as appropriate and evaluate response- Consider cultural and social influences on pain and pain management- Notify physician/advanced practitioner if interventions unsuccessful or patient reports new pain  Outcome: Progressing     Problem: INFECTION - ADULT  Goal: Absence or prevention of progression during hospitalization  Description: INTERVENTIONS:- Assess and monitor for signs and symptoms of infection- Monitor lab/diagnostic results- Monitor all insertion sites, i.e. indwelling lines, tubes, and drains- Monitor endotracheal if appropriate and nasal secretions for changes in amount and color- [place] appropriate cooling/warming therapies per order- Administer medications as ordered- Instruct and encourage patient and family to use good hand hygiene technique- Identify and instruct in appropriate isolation precautions for identified infection/condition  Outcome: Progressing  Goal: Absence of fever/infection during neutropenic period  Description: INTERVENTIONS:- Monitor WBC  Outcome: Progressing     Problem: SAFETY ADULT  Goal: Patient will remain free of falls  Description: INTERVENTIONS:- Educate patient/family on patient safety including physical limitations- Instruct patient to call for assistance with activity - Consult OT/PT to assist with strengthening/mobility - Keep Call bell within reach- Keep bed low and locked with side rails adjusted as appropriate- Keep care items and personal belongings within reach- Initiate and maintain comfort rounds- Make Fall Risk Sign visible to staff- Offer Toileting every 2 Hours, in advance of need- Initiate/Maintain bed alarm-  Obtain necessary fall risk management equipment: alarms- Apply yellow socks and bracelet for high fall risk patients- Consider moving patient to room near nurses station  Outcome: Progressing  Goal: Maintain or return to baseline ADL function  Description: INTERVENTIONS:-  Assess patient's ability to carry out ADLs; assess patient's baseline for ADL function and identify physical deficits which impact ability to perform ADLs (bathing, care of mouth/teeth, toileting, grooming, dressing, etc.)- Assess/evaluate cause of self-care deficits - Assess range of motion- Assess patient's mobility; develop plan if impaired- Assess patient's need for assistive devices and provide as appropriate- Encourage maximum independence but intervene and supervise when necessary- Involve family in performance of ADLs- Assess for home care needs following discharge - Consider OT consult to assist with ADL evaluation and planning for discharge- Provide patient education as appropriate  Outcome: Progressing  Goal: Maintains/Returns to pre admission functional level  Description: INTERVENTIONS:- Perform AM-PAC 6 Click Basic Mobility/ Daily Activity assessment daily.- Set and communicate daily mobility goal to care team and patient/family/caregiver. - Collaborate with rehabilitation services on mobility goals if consulted- Perform Range of3 Motion 3 times a day.- Reposition patient every 2 hours.- Dangle patient 3 times a day- Stand patient 3 times a day- Ambulate patient 3 times a day- Out of bed to chair 3 times a day - Out of bed for meals 3 times a day- Out of bed for toileting- Record patient progress and toleration of activity level   Outcome: Progressing     Problem: DISCHARGE PLANNING  Goal: Discharge to home or other facility with appropriate resources  Description: INTERVENTIONS:- Identify barriers to discharge w/patient and caregiver- Arrange for needed discharge resources and transportation as appropriate- Identify discharge  learning needs (meds, wound care, etc.)- Arrange for interpretive services to assist at discharge as needed- Refer to Case Management Department for coordinating discharge planning if the patient needs post-hospital services based on physician/advanced practitioner order or complex needs related to functional status, cognitive ability, or social support system  Outcome: Progressing     Problem: Knowledge Deficit  Goal: Patient/family/caregiver demonstrates understanding of disease process, treatment plan, medications, and discharge instructions  Description: Complete learning assessment and assess knowledge base.Interventions:- Provide teaching at level of understanding- Provide teaching via preferred learning methods  Outcome: Progressing

## 2025-04-13 NOTE — ED ATTENDING ATTESTATION
Final Diagnosis:  1. Hypokalemia    2. High anion gap metabolic acidosis    3. Weakness    4. TICO (acute kidney injury) (HCC)           I, Binu Mirza MD, saw and evaluated the patient. All available labs and X-rays were ordered by me or the resident and have been reviewed by myself. I discussed the patient with the resident / non-physician and agree with the resident's / non-physician practitioner's findings and plan as documented in the resident's / non-physician practicitioner's note, except where noted.   At this point, I agree with the current assessment done in the ED.   I was present during key portions of all procedures performed unless otherwise stated.     Chief Complaint   Patient presents with    Weakness - Generalized     Patient reports generalized weakness/pain, fatigue, and vomiting. Patient gets daily K infusions.       This is a 40 y.o. female presenting for evaluation of generalized weakness going on for the past couple days.  Patient was diagnosed with a laryngitis about a week ago and feels that she has not been improving since then.  She does have a history of hypokalemia and has been giving herself her required potassium injections.  She does have a left-sided port.  No signs of infection here.  She denies any fever or chills but just states that she feels diffusely weak.  Denies any dl pain but instead of generalized malaise.    PMH:   has a past medical history of C. difficile colitis (2016), COVID-19, DUB (dysfunctional uterine bleeding), H. pylori infection, Hypertension, Hypokalemia, Kidney stone (November 2023), Left lower quadrant abdominal pain (08/17/2020), Migraine, WAGNER (obstructive sleep apnea) (06/11/2014), Pancreatitis, Psychiatric disorder, Rectal bleeding, Renal disorder, Rhabdomyolysis, Sleep apnea, and Thrombosed external hemorrhoid.    PSH:   has a past surgical history that includes Hysterectomy; Appendectomy; Cholecystectomy; Gastric bypass; Abdominal surgery; FL  "guided central venous access device insertion (2018); Tunneled venous port placement (N/A, 2018);  section; Cosmetic surgery; LAPAROSCOPY; pr anrct xm surg req anes general spi/edrl dx (N/A, 2021); pr exc thrombosed hemorrhoid xtrnl (N/A, 2022); pr cysto/uretero w/lithotripsy &indwell stent insrt (Left, 1/3/2023); FL retrograde pyelogram (1/3/2023); pr rmvl catrina ctr vad w/subq port/ ctr/prph insj (Right, 2024); pr insj tunneled ctr vad w/subq port age 4 yr/> (Left, 2024); and FL guided central venous access device insertion (2024).    Procedures     Social:  Social History     Substance and Sexual Activity   Alcohol Use Not Currently     Social History     Tobacco Use   Smoking Status Never    Passive exposure: Never   Smokeless Tobacco Never     Social History     Substance and Sexual Activity   Drug Use Not Currently    Types: Marijuana    Comment: Medical marijuana     PE:  Vitals:    25 0849 25 0930 25 1000 25 1200   BP: 126/66 129/62 136/62 129/64   BP Location: Left arm Left arm Left arm Left arm   Pulse: 105 85 83 84   Resp: 20 20 18 16   Temp: (!) 97.1 °F (36.2 °C)      TempSrc: Temporal      SpO2: 100% 100% 99% 100%   Weight: 56.9 kg (125 lb 7.1 oz)      Height: 5' 2\" (1.575 m)          A:    Unless otherwise specified above:     General: VS reviewed  Appears in NAD     Head: Normocephalic, atraumatic     CV: No pallor noted  Lungs:   No respiratory distress     Abdomen:  Soft, non-tender, non-distended     MSK:   No obvious deformity     Skin: No obvious rash.     Neuro: Awake, alert, GCS15, CN II-XII grossly intact. Speaking in full sentences.   Motor grossly intact.     Psychiatric/Behavioral: Appropriate mood and affect   Exam: deferred    P:  -Review records show that patient does have an extensive history of surgeries as well as medical issues.  Pancreatitis, CKD, gastric bypass likely leading to her current potassium issues.  - Will " provide a broad analysis.  Evaluate for electrolyte abnormalities, signs of infection such as flu is COVID, urinalysis, provide fluids, analgesia.  Reassess.  - Patient found to have significant hypokalemia as well as an TICO.  Given her history of low potassium and self infusions daily and she still has a very significantly low potassium.  I think it is unlikely that she will be able to replete this on her home at home.  Reviewed with the medical team.  Will be admitted to their care for further volume resuscitation and electrolyte repletion.  - 13 point ROS was performed and all are normal unless stated in the history above.   - Nursing note reviewed. Vitals reviewed.   - Orders placed by myself and/or advanced practitioner / resident.    - Previous chart was reviewed  - No language barrier.   - History obtained from patient.   - There are no limitations to the history obtained.     Unless otherwise specified:  CC is exclusive from any separately billable procedures  CC is exclusive of treating other patients  CC is exclusive of teaching time     Code Status: Level 1 - Full Code  Advance Directive and Living Will:      Power of :    POLST:      Medications   sodium chloride 0.9 % with KCl 20 mEq/L infusion (premix) (has no administration in time range)   potassium chloride 20 mEq IVPB (premix) (has no administration in time range)   amitriptyline (ELAVIL) tablet 75 mg (has no administration in time range)   ergocalciferol (VITAMIN D2) capsule 50,000 Units (has no administration in time range)   spironolactone (ALDACTONE) tablet 12.5 mg (has no administration in time range)   ondansetron (ZOFRAN-ODT) dispersible tablet 4 mg (has no administration in time range)   meclizine (ANTIVERT) tablet 12.5 mg (has no administration in time range)   escitalopram (LEXAPRO) tablet 20 mg (has no administration in time range)   acetaminophen (TYLENOL) tablet 650 mg (has no administration in time range)   enoxaparin  (LOVENOX) subcutaneous injection 40 mg (has no administration in time range)   droperidol (INAPSINE) injection 0.625 mg (0.625 mg Intravenous Given 4/13/25 0859)   sodium chloride 0.9 % bolus 1,000 mL (0 mL Intravenous Stopped 4/13/25 1216)   acetaminophen (Ofirmev) injection 1,000 mg (0 mg Intravenous Stopped 4/13/25 0918)   potassium chloride 20 mEq IVPB (premix) (0 mEq Intravenous Stopped 4/13/25 1216)     CT abdomen pelvis wo contrast    (Results Pending)     Orders Placed This Encounter   Procedures    FLU/COVID Rapid Antigen (30 min. TAT) - Preferred screening test in ED    Respiratory Panel 2.1(RP2)with COVID19    CT abdomen pelvis wo contrast    CBC and differential    Comprehensive metabolic panel    Lipase    UA w Reflex to Microscopic w Reflex to Culture    Magnesium    TSH, 3rd generation with Free T4 reflex    Phosphorus    Beta Hydroxybutyrate    T4, free    Sodium, urine, random    Creatinine, urine, random    Comprehensive metabolic panel    Magnesium    Phosphorus    CBC (With Platelets)    Platelet count    Urea nitrogen, urine    Diet Regular; Regular House    Fingerstick Glucose (POCT)    Okay to use Port A Cath    Continuous cardiac monitoring    Nursing communication Continue IV as ordered.    Notify admitting physician    Notify admitting physician on arrival    Vital Signs per unit routine    I/O    Insert peripheral IV    Maintain IV access    Apply SCD or Foot pumps    Strict intake and output    24 Hour Telemetry Monitoring    Activity as Tolerated    Out of Bed to Chair    Level 1-Full Code: all life saving measures are indicated    Inpatient consult to Nephrology    Contact and airborne isolation status    ECG 12 lead    INPATIENT ADMISSION     Labs Reviewed   CBC AND DIFFERENTIAL - Abnormal       Result Value Ref Range Status    WBC 13.74 (*) 4.31 - 10.16 Thousand/uL Final    RBC 5.58 (*) 3.81 - 5.12 Million/uL Final    Hemoglobin 14.0  11.5 - 15.4 g/dL Final    Hematocrit 43.2  34.8 -  46.1 % Final    MCV 77 (*) 82 - 98 fL Final    MCH 25.1 (*) 26.8 - 34.3 pg Final    MCHC 32.4  31.4 - 37.4 g/dL Final    RDW 15.3 (*) 11.6 - 15.1 % Final    MPV 9.8  8.9 - 12.7 fL Final    Platelets 449 (*) 149 - 390 Thousands/uL Final    nRBC 0  /100 WBCs Final    Segmented % 83 (*) 43 - 75 % Final    Immature Grans % 0  0 - 2 % Final    Lymphocytes % 9 (*) 14 - 44 % Final    Monocytes % 8  4 - 12 % Final    Eosinophils Relative 0  0 - 6 % Final    Basophils Relative 0  0 - 1 % Final    Absolute Neutrophils 11.32 (*) 1.85 - 7.62 Thousands/µL Final    Absolute Immature Grans 0.05  0.00 - 0.20 Thousand/uL Final    Absolute Lymphocytes 1.27  0.60 - 4.47 Thousands/µL Final    Absolute Monocytes 1.03  0.17 - 1.22 Thousand/µL Final    Eosinophils Absolute 0.02  0.00 - 0.61 Thousand/µL Final    Basophils Absolute 0.05  0.00 - 0.10 Thousands/µL Final   COMPREHENSIVE METABOLIC PANEL - Abnormal    Sodium 131 (*) 135 - 147 mmol/L Final    Potassium 2.5 (*) 3.5 - 5.3 mmol/L Final    Chloride 96  96 - 108 mmol/L Final    CO2 19 (*) 21 - 32 mmol/L Final    ANION GAP 16 (*) 4 - 13 mmol/L Final    BUN 39 (*) 5 - 25 mg/dL Final    Creatinine 2.16 (*) 0.60 - 1.30 mg/dL Final    Comment: Standardized to IDMS reference method    Glucose 157 (*) 65 - 140 mg/dL Final    Comment: If the patient is fasting, the ADA then defines impaired fasting glucose as > 100 mg/dL and diabetes as > or equal to 123 mg/dL.    Calcium 9.1  8.4 - 10.2 mg/dL Final    AST 18  13 - 39 U/L Final    ALT 17  7 - 52 U/L Final    Comment: Specimen collection should occur prior to Sulfasalazine administration due to the potential for falsely depressed results.     Alkaline Phosphatase 61  34 - 104 U/L Final    Total Protein 8.6 (*) 6.4 - 8.4 g/dL Final    Albumin 4.9  3.5 - 5.0 g/dL Final    Total Bilirubin 0.44  0.20 - 1.00 mg/dL Final    Comment: Use of this assay is not recommended for patients undergoing treatment with eltrombopag due to the potential for  falsely elevated results.  N-acetyl-p-benzoquinone imine (metabolite of Acetaminophen) will generate erroneously low results in samples for patients that have taken an overdose of Acetaminophen.    eGFR 27  ml/min/1.73sq m Final    Narrative:     National Kidney Disease Foundation guidelines for Chronic Kidney Disease (CKD):     Stage 1 with normal or high GFR (GFR > 90 mL/min/1.73 square meters)    Stage 2 Mild CKD (GFR = 60-89 mL/min/1.73 square meters)    Stage 3A Moderate CKD (GFR = 45-59 mL/min/1.73 square meters)    Stage 3B Moderate CKD (GFR = 30-44 mL/min/1.73 square meters)    Stage 4 Severe CKD (GFR = 15-29 mL/min/1.73 square meters)    Stage 5 End Stage CKD (GFR <15 mL/min/1.73 square meters)  Note: GFR calculation is accurate only with a steady state creatinine   TSH, 3RD GENERATION WITH FREE T4 REFLEX - Abnormal    TSH 3RD GENERATON 5.153 (*) 0.450 - 4.500 uIU/mL Final    Comment: The recommended reference ranges for TSH during pregnancy are as follows:   First trimester 0.100 to 2.500 uIU/mL   Second trimester  0.200 to 3.000 uIU/mL   Third trimester 0.300 to 3.000 uIU/m    Note: Normal ranges may not apply to patients who are transgender, non-binary, or whose legal sex, sex at birth, and gender identity differ.  Adult TSH (3rd generation) reference range follows the recommended guidelines of the American Thyroid Association, January, 2020.   PHOSPHORUS - Abnormal    Phosphorus 5.1 (*) 2.7 - 4.5 mg/dL Final   BETA HYDROXYBUTYRATE - Abnormal    Beta- Hydroxybutyrate 0.28 (*) 0.02 - 0.27 mmol/L Final   POCT GLUCOSE - Abnormal    POC Glucose 146 (*) 65 - 140 mg/dl Final   COVID-19/INFLUENZA A/B RAPID ANTIGEN (30 MIN.TAT) - Normal    SARS COV Rapid Antigen Negative  Negative Final    Influenza A Rapid Antigen Negative  Negative Final    Influenza B Rapid Antigen Negative  Negative Final    Narrative:     This test has been performed using the [employer] Sammi 2 FLU+SARS Antigen test under the Emergency Use  Authorization (EUA). This test has been validated by the  and verified by the performing laboratory. The Sammi uses lateral flow immunofluorescent sandwich assay to detect SARS-COV, Influenza A and Influenza B Antigen.     The [employer] Sammi 2 SARS Antigen test does not differentiate between SARS-CoV and SARS-CoV-2.     Negative results are presumptive and may be confirmed with a molecular assay, if necessary, for patient management. Negative results do not rule out SARS-CoV-2 or influenza infection and should not be used as the sole basis for treatment or patient management decisions. A negative test result may occur if the level of antigen in a sample is below the limit of detection of this test.     Positive results are indicative of the presence of viral antigens, but do not rule out bacterial infection or co-infection with other viruses.     All test results should be used as an adjunct to clinical observations and other information available to the provider.    FOR PEDIATRIC PATIENTS - copy/paste COVID Guidelines URL to browser: [email or web address]   LIPASE - Normal    Lipase 40  11 - 82 u/L Final   MAGNESIUM - Normal    Magnesium 2.7  1.9 - 2.7 mg/dL Final   RESPIRATORY PANEL 2 (RP2)   UA W REFLEX TO MICROSCOPIC WITH REFLEX TO CULTURE   T4, FREE   SODIUM, URINE, RANDOM   CREATININE, URINE, RANDOM   PLATELET COUNT   UREA NITROGEN, URINE     Time reflects when diagnosis was documented in both MDM as applicable and the Disposition within this note       Time User Action Codes Description Comment    4/13/2025 12:07 PM Khushi Garcia Add [E87.29] High anion gap metabolic acidosis     4/13/2025 12:11 PM Binu Mirza Add [E87.6] Hypokalemia     4/13/2025 12:11 PM Binu Mirza Modify [E87.29] High anion gap metabolic acidosis     4/13/2025 12:11 PM Binu Mirza Modify [E87.6] Hypokalemia     4/13/2025 12:11 PM Binu Mirza Add [R53.1]  "Weakness     2025 12:22 PM Mallory Gonsalez Add [N17.9] TICO (acute kidney injury) (HCC)           ED Disposition       ED Disposition   Admit    Condition   Stable    Date/Time   Sun 2025 12:11 PM    Comment   Case was discussed with NASRA and the patient's admission status was agreed to be Admission Status: inpatient status to the service of Dr. Garcia .               Follow-up Information    None       Patient's Medications   Discharge Prescriptions    No medications on file     No discharge procedures on file.  Prior to Admission Medications   Prescriptions Last Dose Informant Patient Reported? Taking?   EPINEPHrine (EPIPEN) 0.3 mg/0.3 mL SOAJ  Self Yes No   Sig: Inject 0.3 mg into a muscle   Galcanezumab-gnlm 120 MG/ML SOAJ  Self No No   Sig: Inject 120 mg under the skin every 30 (thirty) days   SUMAtriptan Succinate 6 MG/0.5ML SOAJ  Self No No   Sig: Inject 0.5 mL (6 mg total) under the skin as needed (migraine) May repeat once in 1 hour if needed. Max 12 mg/day, Max 3 days per week   Syringe/Needle, Disp, (SYRINGE 3CC/25GX5/8\") 25G X 5\" 3 ML MISC  Self No No   Sig: Use once monthly for B12 injection.   amitriptyline (ELAVIL) 50 mg tablet   No No   Sig: Take 1.5 tablets (75 mg total) by mouth daily at bedtime   amoxicillin-clavulanate (AUGMENTIN) 500-125 mg per tablet   Yes No   Sig: take 1 tab(s) orally every 8 hours for 7 days   cyanocobalamin 1,000 mcg/mL  Self No No   Sig: Inject 1 mL (1,000 mcg total) into a muscle every 30 (thirty) days Next dose due 2023   dexamethasone (DECADRON) 4 mg tablet  Self No No   Sig: Take 8 mg qam x 1 day, take 4 mg qam x 1 day, then take 2 mg qam x 4 days then stop   ergocalciferol (ERGOCALCIFEROL) 1.25 MG (89343 UT) capsule  Self No No   Sig: Take 1 capsule (50,000 Units total) by mouth 2 (two) times a week  and    escitalopram (LEXAPRO) 20 mg tablet  Self Yes No   Si mg daily at bedtime   gabapentin (NEURONTIN) 100 mg capsule  Self Yes No " "  Sig: Take 100 mg by mouth Three times a day   meclizine (ANTIVERT) 12.5 MG tablet  Self No No   Sig: Take 1 tablet (12.5 mg total) by mouth 3 (three) times a day as needed for dizziness Do not take daily   methylPREDNISolone 4 MG tablet therapy pack   No No   Sig: Use as directed on package   ondansetron (ZOFRAN-ODT) 4 mg disintegrating tablet  Self Yes No   Sig: Take 4 mg by mouth every 6 (six) hours as needed for nausea or vomiting   oxyCODONE (ROXICODONE) 5 immediate release tablet   No No   Sig: Take 1 tablet (5 mg total) by mouth every 8 (eight) hours as needed for severe pain for up to 7 doses Max Daily Amount: 15 mg   potassium chloride 20 MEQ/50ML  Self No No   Sig: Inject 200 mL (80 mEq total) into a catheter in a vein daily 80 meq in 1 litre bag to be infused daily over 8 hours. Continue as ordered prior to admission   spironolactone (ALDACTONE) 25 mg tablet  Self No No   Sig: Take 0.5 tablets (12.5 mg total) by mouth daily      Facility-Administered Medications: None       Portions of the record may have been created with voice recognition software. Occasional wrong word or \"sound a like\" substitutions may have occurred due to the inherent limitations of voice recognition software. Read the chart carefully and recognize, using context, where substitutions have occurred.    Electronically signed by:  Binu Mirza    "

## 2025-04-13 NOTE — ASSESSMENT & PLAN NOTE
Presented with AG16. Hx of elevated AG 16 at outpatient setting  Could be starvation ketosis, patient has minimal oral intake in past 2-3 days, with multiple vomiting episodes  - Check VBG  - check lactic acid  - monitor am labs

## 2025-04-13 NOTE — ASSESSMENT & PLAN NOTE
History of gastric bypass in 2014, w/ L chest port for infusion  Recurrent electrolyte displacement for many years.   On daily potassium infusion, monthly vitamin B12 injection and weekly vitamin D 82489Y oral supplements  - Avoid NSAIDs   - f/u GI outpatient

## 2025-04-13 NOTE — ASSESSMENT & PLAN NOTE
Generalized weakness for past 2-3 days.   Patient was on Augmentin and medrol dose pack for laryngitis and didn't tolerate PO diet at home in last few days. Multiple nausea and vomiting episodes. Notices dysuria on 04/13  Fatigue likely 2/2 recent dehydration and URI or UTI  In ED, found to have electrolyte displacement, potassium 2.5 and sodium 131    - check BMP in afternoon; replete potassium and sodium as needed   - see hypokalemia A/P  - f/u UA, Ucx  - continue clear liquid diet  - IV zofran prn for nausea, add protonix

## 2025-04-13 NOTE — ASSESSMENT & PLAN NOTE
Presented with potassium 2.5 (baseline ~4.0)  Replete with total IV potassium 60mEq, currently on IV fluids w/ potassium 20mEq    - on telemetry  - check BMP in afternoon  - replete potassium as needed  - hold home med aldactone 12.5mg daily  - check morning am lab

## 2025-04-14 PROBLEM — E87.29 INCREASED ANION GAP METABOLIC ACIDOSIS: Status: RESOLVED | Noted: 2025-01-08 | Resolved: 2025-04-14

## 2025-04-14 PROBLEM — R30.0 DYSURIA: Status: ACTIVE | Noted: 2025-04-14

## 2025-04-14 PROBLEM — E87.1 HYPONATREMIA: Status: RESOLVED | Noted: 2022-03-04 | Resolved: 2025-04-14

## 2025-04-14 PROBLEM — N39.0 UTI (URINARY TRACT INFECTION): Status: ACTIVE | Noted: 2025-04-14

## 2025-04-14 LAB
ALBUMIN SERPL BCG-MCNC: 3.5 G/DL (ref 3.5–5)
ALP SERPL-CCNC: 42 U/L (ref 34–104)
ALT SERPL W P-5'-P-CCNC: 11 U/L (ref 7–52)
ANION GAP SERPL CALCULATED.3IONS-SCNC: 6 MMOL/L (ref 4–13)
ANION GAP SERPL CALCULATED.3IONS-SCNC: 8 MMOL/L (ref 4–13)
AST SERPL W P-5'-P-CCNC: 14 U/L (ref 13–39)
B PARAP IS1001 DNA NPH QL NAA+NON-PROBE: NOT DETECTED
B PERT.PT PRMT NPH QL NAA+NON-PROBE: NOT DETECTED
BILIRUB SERPL-MCNC: 0.37 MG/DL (ref 0.2–1)
BUN SERPL-MCNC: 17 MG/DL (ref 5–25)
BUN SERPL-MCNC: 23 MG/DL (ref 5–25)
C PNEUM DNA NPH QL NAA+NON-PROBE: NOT DETECTED
CALCIUM SERPL-MCNC: 7.3 MG/DL (ref 8.4–10.2)
CALCIUM SERPL-MCNC: 7.5 MG/DL (ref 8.4–10.2)
CHLORIDE SERPL-SCNC: 106 MMOL/L (ref 96–108)
CHLORIDE SERPL-SCNC: 107 MMOL/L (ref 96–108)
CO2 SERPL-SCNC: 21 MMOL/L (ref 21–32)
CO2 SERPL-SCNC: 23 MMOL/L (ref 21–32)
CREAT SERPL-MCNC: 1.3 MG/DL (ref 0.6–1.3)
CREAT SERPL-MCNC: 1.38 MG/DL (ref 0.6–1.3)
ERYTHROCYTE [DISTWIDTH] IN BLOOD BY AUTOMATED COUNT: 15.5 % (ref 11.6–15.1)
FLUAV RNA NPH QL NAA+NON-PROBE: NOT DETECTED
FLUBV RNA NPH QL NAA+NON-PROBE: NOT DETECTED
GFR SERPL CREATININE-BSD FRML MDRD: 47 ML/MIN/1.73SQ M
GFR SERPL CREATININE-BSD FRML MDRD: 51 ML/MIN/1.73SQ M
GLUCOSE SERPL-MCNC: 89 MG/DL (ref 65–140)
GLUCOSE SERPL-MCNC: 89 MG/DL (ref 65–140)
HADV DNA NPH QL NAA+NON-PROBE: NOT DETECTED
HCOV 229E RNA NPH QL NAA+NON-PROBE: NOT DETECTED
HCOV HKU1 RNA NPH QL NAA+NON-PROBE: NOT DETECTED
HCOV NL63 RNA NPH QL NAA+NON-PROBE: NOT DETECTED
HCOV OC43 RNA NPH QL NAA+NON-PROBE: NOT DETECTED
HCT VFR BLD AUTO: 30.1 % (ref 34.8–46.1)
HGB BLD-MCNC: 9.5 G/DL (ref 11.5–15.4)
HMPV RNA NPH QL NAA+NON-PROBE: NOT DETECTED
HPIV1 RNA NPH QL NAA+NON-PROBE: NOT DETECTED
HPIV2 RNA NPH QL NAA+NON-PROBE: NOT DETECTED
HPIV3 RNA NPH QL NAA+NON-PROBE: NOT DETECTED
HPIV4 RNA NPH QL NAA+NON-PROBE: NOT DETECTED
M PNEUMO DNA NPH QL NAA+NON-PROBE: NOT DETECTED
MAGNESIUM SERPL-MCNC: 2.2 MG/DL (ref 1.9–2.7)
MCH RBC QN AUTO: 25.5 PG (ref 26.8–34.3)
MCHC RBC AUTO-ENTMCNC: 31.6 G/DL (ref 31.4–37.4)
MCV RBC AUTO: 81 FL (ref 82–98)
PHOSPHATE SERPL-MCNC: 3.1 MG/DL (ref 2.7–4.5)
PLATELET # BLD AUTO: 276 THOUSANDS/UL (ref 149–390)
PMV BLD AUTO: 10 FL (ref 8.9–12.7)
POTASSIUM SERPL-SCNC: 2.5 MMOL/L (ref 3.5–5.3)
POTASSIUM SERPL-SCNC: 3.2 MMOL/L (ref 3.5–5.3)
PROT SERPL-MCNC: 5.9 G/DL (ref 6.4–8.4)
RBC # BLD AUTO: 3.73 MILLION/UL (ref 3.81–5.12)
RSV RNA NPH QL NAA+NON-PROBE: NOT DETECTED
RV+EV RNA NPH QL NAA+NON-PROBE: NOT DETECTED
SARS-COV-2 RNA NPH QL NAA+NON-PROBE: NOT DETECTED
SODIUM SERPL-SCNC: 133 MMOL/L (ref 135–147)
SODIUM SERPL-SCNC: 138 MMOL/L (ref 135–147)
WBC # BLD AUTO: 6.36 THOUSAND/UL (ref 4.31–10.16)

## 2025-04-14 PROCEDURE — 80048 BASIC METABOLIC PNL TOTAL CA: CPT

## 2025-04-14 PROCEDURE — 99232 SBSQ HOSP IP/OBS MODERATE 35: CPT | Performed by: FAMILY MEDICINE

## 2025-04-14 PROCEDURE — 99233 SBSQ HOSP IP/OBS HIGH 50: CPT | Performed by: INTERNAL MEDICINE

## 2025-04-14 PROCEDURE — 80053 COMPREHEN METABOLIC PANEL: CPT

## 2025-04-14 PROCEDURE — 84100 ASSAY OF PHOSPHORUS: CPT

## 2025-04-14 PROCEDURE — 85027 COMPLETE CBC AUTOMATED: CPT

## 2025-04-14 PROCEDURE — 83735 ASSAY OF MAGNESIUM: CPT

## 2025-04-14 RX ORDER — FLUCONAZOLE 100 MG/1
200 TABLET ORAL ONCE
Status: COMPLETED | OUTPATIENT
Start: 2025-04-14 | End: 2025-04-14

## 2025-04-14 RX ORDER — POTASSIUM CHLORIDE 14.9 MG/ML
20 INJECTION INTRAVENOUS
Status: COMPLETED | OUTPATIENT
Start: 2025-04-14 | End: 2025-04-14

## 2025-04-14 RX ORDER — CEFTRIAXONE 1 G/50ML
1000 INJECTION, SOLUTION INTRAVENOUS EVERY 24 HOURS
Status: DISCONTINUED | OUTPATIENT
Start: 2025-04-14 | End: 2025-04-16

## 2025-04-14 RX ORDER — POTASSIUM CHLORIDE 1500 MG/1
40 TABLET, EXTENDED RELEASE ORAL 2 TIMES DAILY
Status: COMPLETED | OUTPATIENT
Start: 2025-04-14 | End: 2025-04-14

## 2025-04-14 RX ORDER — POTASSIUM CHLORIDE 1500 MG/1
40 TABLET, EXTENDED RELEASE ORAL ONCE
Status: COMPLETED | OUTPATIENT
Start: 2025-04-14 | End: 2025-04-14

## 2025-04-14 RX ADMIN — ONDANSETRON 4 MG: 2 INJECTION INTRAMUSCULAR; INTRAVENOUS at 08:39

## 2025-04-14 RX ADMIN — SODIUM CHLORIDE AND POTASSIUM CHLORIDE 125 ML/HR: .9; .15 SOLUTION INTRAVENOUS at 19:15

## 2025-04-14 RX ADMIN — AMITRIPTYLINE HYDROCHLORIDE 75 MG: 25 TABLET, FILM COATED ORAL at 21:39

## 2025-04-14 RX ADMIN — SODIUM CHLORIDE AND POTASSIUM CHLORIDE 125 ML/HR: .9; .15 SOLUTION INTRAVENOUS at 04:56

## 2025-04-14 RX ADMIN — POTASSIUM CHLORIDE 40 MEQ: 1500 TABLET, EXTENDED RELEASE ORAL at 19:15

## 2025-04-14 RX ADMIN — POTASSIUM CHLORIDE 20 MEQ: 14.9 INJECTION, SOLUTION INTRAVENOUS at 10:06

## 2025-04-14 RX ADMIN — CEFTRIAXONE 1000 MG: 1 INJECTION, SOLUTION INTRAVENOUS at 07:56

## 2025-04-14 RX ADMIN — POTASSIUM CHLORIDE 20 MEQ: 14.9 INJECTION, SOLUTION INTRAVENOUS at 08:33

## 2025-04-14 RX ADMIN — ESCITALOPRAM OXALATE 20 MG: 20 TABLET ORAL at 21:39

## 2025-04-14 RX ADMIN — POTASSIUM CHLORIDE 40 MEQ: 1500 TABLET, EXTENDED RELEASE ORAL at 11:41

## 2025-04-14 RX ADMIN — POTASSIUM CHLORIDE 40 MEQ: 1500 TABLET, EXTENDED RELEASE ORAL at 17:52

## 2025-04-14 RX ADMIN — FLUCONAZOLE 200 MG: 100 TABLET ORAL at 05:19

## 2025-04-14 NOTE — ASSESSMENT & PLAN NOTE
Presented with potassium 2.5 (baseline ~4.0)  04/13: Replete with total IV potassium 60mEq, and on IV fluids w/ potassium 20mEq; potassium sill remain same  04/14: additional two doses of IV potassium 20mEq    - on telemetry  - check BMP in afternoon  - replete potassium as needed  - hold home med aldactone 12.5mg daily  - check morning am lab

## 2025-04-14 NOTE — PLAN OF CARE
Problem: PAIN - ADULT  Goal: Verbalizes/displays adequate comfort level or baseline comfort level  Description: Interventions:- Encourage patient to monitor pain and request assistance- Assess pain using appropriate pain scale- Administer analgesics based on type and severity of pain and evaluate response- Implement non-pharmacological measures as appropriate and evaluate response- Consider cultural and social influences on pain and pain management- Notify physician/advanced practitioner if interventions unsuccessful or patient reports new pain  Outcome: Progressing     Problem: INFECTION - ADULT  Goal: Absence or prevention of progression during hospitalization  Description: INTERVENTIONS:- Assess and monitor for signs and symptoms of infection- Monitor lab/diagnostic results- Monitor all insertion sites, i.e. indwelling lines, tubes, and drains- Monitor endotracheal if appropriate and nasal secretions for changes in amount and color- [place] appropriate cooling/warming therapies per order- Administer medications as ordered- Instruct and encourage patient and family to use good hand hygiene technique- Identify and instruct in appropriate isolation precautions for identified infection/condition  Outcome: Progressing  Goal: Absence of fever/infection during neutropenic period  Description: INTERVENTIONS:- Monitor WBC  Outcome: Progressing     Problem: SAFETY ADULT  Goal: Patient will remain free of falls  Description: INTERVENTIONS:- Educate patient/family on patient safety including physical limitations- Instruct patient to call for assistance with activity - Consult OT/PT to assist with strengthening/mobility - Keep Call bell within reach- Keep bed low and locked with side rails adjusted as appropriate- Keep care items and personal belongings within reach- Initiate and maintain comfort rounds- Make Fall Risk Sign visible to staff- Offer Toileting every 2 Hours, in advance of need- Initiate/Maintain bed alarm-  Obtain necessary fall risk management equipment: nonskid socks- Apply yellow socks and bracelet for high fall risk patients- Consider moving patient to room near nurses station  Outcome: Progressing  Goal: Maintain or return to baseline ADL function  Description: INTERVENTIONS:-  Assess patient's ability to carry out ADLs; assess patient's baseline for ADL function and identify physical deficits which impact ability to perform ADLs (bathing, care of mouth/teeth, toileting, grooming, dressing, etc.)- Assess/evaluate cause of self-care deficits - Assess range of motion- Assess patient's mobility; develop plan if impaired- Assess patient's need for assistive devices and provide as appropriate- Encourage maximum independence but intervene and supervise when necessary- Involve family in performance of ADLs- Assess for home care needs following discharge - Consider OT consult to assist with ADL evaluation and planning for discharge- Provide patient education as appropriate  Outcome: Progressing  Goal: Maintains/Returns to pre admission functional level  Description: INTERVENTIONS:- Perform AM-PAC 6 Click Basic Mobility/ Daily Activity assessment daily.- Set and communicate daily mobility goal to care team and patient/family/caregiver. - Collaborate with rehabilitation services on mobility goals if consulted- Perform Range of Motion 2 times a day.- Reposition patient every 2 hours.- Dangle patient 2 times a day- Stand patient 2 times a day- Ambulate patient 2 times a day- Out of bed to chair 2 times a day - Out of bed for meals 2 times a day- Out of bed for toileting- Record patient progress and toleration of activity level   Outcome: Progressing     Problem: DISCHARGE PLANNING  Goal: Discharge to home or other facility with appropriate resources  Description: INTERVENTIONS:- Identify barriers to discharge w/patient and caregiver- Arrange for needed discharge resources and transportation as appropriate- Identify discharge  learning needs (meds, wound care, etc.)- Arrange for interpretive services to assist at discharge as needed- Refer to Case Management Department for coordinating discharge planning if the patient needs post-hospital services based on physician/advanced practitioner order or complex needs related to functional status, cognitive ability, or social support system  Outcome: Progressing     Problem: Knowledge Deficit  Goal: Patient/family/caregiver demonstrates understanding of disease process, treatment plan, medications, and discharge instructions  Description: Complete learning assessment and assess knowledge base.Interventions:- Provide teaching at level of understanding- Provide teaching via preferred learning methods  Outcome: Progressing

## 2025-04-14 NOTE — ASSESSMENT & PLAN NOTE
Presented with low sodium 131  Patient reported not eating much due to laryngitis; IV fluids initiated in ED  Trending back to normal on 04/14  - continue monitor am labs

## 2025-04-14 NOTE — ASSESSMENT & PLAN NOTE
Dysuria on 04/13  UA shows UTI  Diflucan given once    - start IV Rocephin on 04/14  - monitor symptoms and am labs

## 2025-04-14 NOTE — ASSESSMENT & PLAN NOTE
Lab Results   Component Value Date    EGFR 47 04/14/2025    EGFR 33 04/13/2025    EGFR 27 04/13/2025    CREATININE 1.38 (H) 04/14/2025    CREATININE 1.84 (H) 04/13/2025    CREATININE 2.16 (H) 04/13/2025   See TICO A/P

## 2025-04-14 NOTE — ASSESSMENT & PLAN NOTE
Lab Results   Component Value Date    EGFR 51 04/14/2025    EGFR 47 04/14/2025    EGFR 33 04/13/2025    CREATININE 1.30 04/14/2025    CREATININE 1.38 (H) 04/14/2025    CREATININE 1.84 (H) 04/13/2025   Baseline creatinine around 1.0 mg/dL

## 2025-04-14 NOTE — PROGRESS NOTES
Progress Note - Hospitalist   Name: Brittani Patino 40 y.o. female I MRN: [id number]  Unit/Bed#: 422-01 I Date of Admission: 4/13/2025   Date of Service: 4/14/2025 I Hospital Day: 1    Assessment & Plan  Weakness  Generalized weakness for past 2-3 days.   Patient was on Augmentin and medrol dose pack for laryngitis and didn't tolerate PO diet at home for last few days. Multiple nausea and vomiting episodes. Notices dysuria on 04/13  Fatigue likely 2/2 recent dehydration and URI or UTI  In ED, found to have electrolyte displacement, potassium 2.5 and sodium 131    - patient has slight improvement with fatigue on 04/14  - replete potassium and sodium as needed   - see hypokalemia A/P  - f/u Ucx  - transition from clear liquid to regular diet  - IV zofran prn for nausea  Hypokalemia  Presented with potassium 2.5 (baseline ~4.0)  04/13: Replete with total IV potassium 60mEq, and on IV fluids w/ potassium 20mEq; potassium sill remain same  04/14: additional two doses of IV potassium 20mEq    - on telemetry  - check BMP in afternoon  - replete potassium as needed  - hold home med aldactone 12.5mg daily  - check morning am lab  TICO (acute kidney injury) (HCC)  Pre-renal TICO on urine studies  Cr elevated 2.16, baseline around 1 to 1.1  Trending down 1.38  - continue IV fluids  - avoid nephrotoxic agents  - nephro consulted and recs appreciated  Increased anion gap metabolic acidosis (Resolved: 4/14/2025)  Presented with AG16. Hx of elevated AG 16 at outpatient setting  Could be starvation ketosis, patient has minimal oral intake in past 2-3 days, with multiple vomiting episodes  Compensated metabolic acidosis on VBG likely due to renal failure vs starvation ketosis  Normal lactic acid; Gap closed on 04/14  - monitor am labs  Hx of gastric bypass  History of gastric bypass in 2014, w/ L chest port for infusion  Recurrent electrolyte displacement for many years.   On daily potassium infusion, monthly vitamin B12 injection and  weekly vitamin D 71595R oral supplements  - Avoid NSAIDs   - f/u GI outpatient   Stage 3 chronic kidney disease (HCC)  Lab Results   Component Value Date    EGFR 47 04/14/2025    EGFR 33 04/13/2025    EGFR 27 04/13/2025    CREATININE 1.38 (H) 04/14/2025    CREATININE 1.84 (H) 04/13/2025    CREATININE 2.16 (H) 04/13/2025   See TICO A/P    Migraine  Continue home med amitriptyline 75mg daily  Hx of anxiety disorder  Continue home med lexapro 20mg daily  Hyponatremia (Resolved: 4/14/2025)  Presented with low sodium 131  Patient reported not eating much due to laryngitis; IV fluids initiated in ED  Trending back to normal on 04/14  - continue monitor am labs  UTI (urinary tract infection)  Dysuria on 04/13  UA shows UTI  Diflucan given once    - start IV Rocephin on 04/14  - monitor symptoms and am labs    VTE Pharmacologic Prophylaxis: VTE Score: 3 Moderate Risk (Score 3-4) - Pharmacological DVT Prophylaxis Ordered: heparin.    Mobility:   Basic Mobility Inpatient Raw Score: 24  JH-HLM Goal: 8: Walk 250 feet or more  JH-HLM Achieved: 8: Walk 250 feet ot more  JH-HLM Goal achieved. Continue to encourage appropriate mobility.    Patient Centered Rounds: I performed bedside rounds with nursing staff today.   Discussions with Specialists or Other Care Team Provider: nephrology, case management    Education and Discussions with Family / Patient: Patient declined call to .     Current Length of Stay: 1 day(s)  Current Patient Status: Inpatient   Certification Statement: The patient will continue to require additional inpatient hospital stay due to weakness 2/2 electrolyte displacement and TICO requiring electrolyte repletion and IV fluids  Discharge Plan: Anticipate discharge in 24-48 hrs to home.    Code Status: Level 1 - Full Code    Subjective   Patient was examined bedside. She feels a bit better in terms of fatigue this morning. She tolerated liquid and is ready to try solid food. No nausea or vomiting  overnight. She has dysuria since yesterday.     Objective :  Temp:  [97.3 °F (36.3 °C)-98.8 °F (37.1 °C)] 97.3 °F (36.3 °C)  HR:  [74-91] 74  BP: (105-145)/(55-82) 106/63  Resp:  [16-20] 19  SpO2:  [97 %-100 %] 99 %  O2 Device: None (Room air)    Body mass index is 23.1 kg/m².     Input and Output Summary (last 24 hours):     Intake/Output Summary (Last 24 hours) at 4/14/2025 0850  Last data filed at 4/14/2025 0501  Gross per 24 hour   Intake 3500 ml   Output --   Net 3500 ml       Physical Exam  Vitals and nursing note reviewed.   Constitutional:       General: She is not in acute distress.     Appearance: Normal appearance. She is well-developed. She is not ill-appearing.   HENT:      Head: Normocephalic and atraumatic.      Right Ear: External ear normal.      Left Ear: External ear normal.      Nose: Nose normal. No congestion.      Mouth/Throat:      Mouth: Mucous membranes are moist.      Pharynx: Oropharynx is clear. No oropharyngeal exudate or posterior oropharyngeal erythema.   Eyes:      General:         Right eye: No discharge.         Left eye: No discharge.      Extraocular Movements: Extraocular movements intact.      Conjunctiva/sclera: Conjunctivae normal.      Pupils: Pupils are equal, round, and reactive to light.   Cardiovascular:      Rate and Rhythm: Normal rate and regular rhythm.      Pulses: Normal pulses.      Heart sounds: Normal heart sounds. No murmur heard.  Pulmonary:      Effort: Pulmonary effort is normal. No respiratory distress.      Breath sounds: Normal breath sounds. No wheezing.   Abdominal:      General: Abdomen is flat. Bowel sounds are normal. There is no distension.      Palpations: Abdomen is soft.      Tenderness: There is no abdominal tenderness. There is no guarding.   Musculoskeletal:         General: No swelling. Normal range of motion.      Cervical back: Normal range of motion and neck supple.      Right lower leg: No edema.      Left lower leg: No edema.   Skin:      General: Skin is warm and dry.      Capillary Refill: Capillary refill takes less than 2 seconds.      Findings: No rash.   Neurological:      General: No focal deficit present.      Mental Status: She is alert and oriented to person, place, and time.   Psychiatric:         Mood and Affect: Mood normal.           Lines/Drains:  Lines/Drains/Airways       Active Status       Name Placement date Placement time Site Days    Port A Cath 02/09/24 Left Chest 02/09/24  1528  Chest  429                    Central Line:  Goal for removal:  N/A, chronic port for infusion          Telemetry:  Telemetry Orders (From admission, onward)               24 Hour Telemetry Monitoring  Continuous x 24 Hours (Telem)        Expiring   Question:  Reason for 24 Hour Telemetry  Answer:  Metabolic/electrolyte disturbance with high probability of dysrhythmia. K level <3 or >6 OR KCL infusion >10mEq/hr                     Telemetry Reviewed: Normal Sinus Rhythm  Indication for Continued Telemetry Use: Metabolic/electrolyte disturbance with high probability of dysrhythmia               Lab Results: I have reviewed the following results:   Results from last 7 days   Lab Units 04/14/25  0630 04/13/25  1418 04/13/25  0858   WBC Thousand/uL 6.36  --  13.74*   HEMOGLOBIN g/dL 9.5*  --  14.0   HEMATOCRIT % 30.1*  --  43.2   PLATELETS Thousands/uL 276   < > 449*   SEGS PCT %  --   --  83*   LYMPHO PCT %  --   --  9*   MONO PCT %  --   --  8   EOS PCT %  --   --  0    < > = values in this interval not displayed.     Results from last 7 days   Lab Units 04/14/25  0456   SODIUM mmol/L 138   POTASSIUM mmol/L 2.5*   CHLORIDE mmol/L 107   CO2 mmol/L 23   BUN mg/dL 23   CREATININE mg/dL 1.38*   ANION GAP mmol/L 8   CALCIUM mg/dL 7.5*   ALBUMIN g/dL 3.5   TOTAL BILIRUBIN mg/dL 0.37   ALK PHOS U/L 42   ALT U/L 11   AST U/L 14   GLUCOSE RANDOM mg/dL 89         Results from last 7 days   Lab Units 04/13/25  0907   POC GLUCOSE mg/dl 146*         Results from last  7 days   Lab Units 04/13/25  1418   LACTIC ACID mmol/L 0.8       Recent Cultures (last 7 days):         Imaging Results Review: I reviewed radiology reports from this admission including: CT abdomen/pelvis.  Other Study Results Review: No additional pertinent studies reviewed.    Last 24 Hours Medication List:     Current Facility-Administered Medications:     acetaminophen (TYLENOL) tablet 650 mg, Q6H PRN    amitriptyline (ELAVIL) tablet 75 mg, HS    cefTRIAXone (ROCEPHIN) IVPB (premix in dextrose) 1,000 mg 50 mL, Q24H, Last Rate: 1,000 mg (04/14/25 0756)    escitalopram (LEXAPRO) tablet 20 mg, HS    heparin (porcine) subcutaneous injection 5,000 Units, Q8H MIKE    meclizine (ANTIVERT) tablet 12.5 mg, TID PRN    ondansetron (ZOFRAN) injection 4 mg, Q4H PRN    potassium chloride 20 mEq IVPB (premix), Q2H, Last Rate: 20 mEq (04/14/25 8719)    sodium chloride 0.9 % with KCl 20 mEq/L infusion (premix), Continuous, Last Rate: 125 mL/hr (04/14/25 0456)    Administrative Statements   Today, Patient Was Seen By: Mallory Gonsalez, DO  I have spent a total time of 20 minutes in caring for this patient on the day of the visit/encounter including Counseling / Coordination of care, Documenting in the medical record, Reviewing/placing orders in the medical record (including tests, medications, and/or procedures), and Obtaining or reviewing history  .    **Please Note: This note may have been constructed using a voice recognition system.**

## 2025-04-14 NOTE — PLAN OF CARE
Problem: PAIN - ADULT  Goal: Verbalizes/displays adequate comfort level or baseline comfort level  Description: Interventions:- Encourage patient to monitor pain and request assistance- Assess pain using appropriate pain scale- Administer analgesics based on type and severity of pain and evaluate response- Implement non-pharmacological measures as appropriate and evaluate response- Consider cultural and social influences on pain and pain management- Notify physician/advanced practitioner if interventions unsuccessful or patient reports new pain  Outcome: Progressing     Problem: INFECTION - ADULT  Goal: Absence or prevention of progression during hospitalization  Description: INTERVENTIONS:- Assess and monitor for signs and symptoms of infection- Monitor lab/diagnostic results- Monitor all insertion sites, i.e. indwelling lines, tubes, and drains- Monitor endotracheal if appropriate and nasal secretions for changes in amount and color- [place] appropriate cooling/warming therapies per order- Administer medications as ordered- Instruct and encourage patient and family to use good hand hygiene technique- Identify and instruct in appropriate isolation precautions for identified infection/condition  Outcome: Progressing  Goal: Absence of fever/infection during neutropenic period  Description: INTERVENTIONS:- Monitor WBC  Outcome: Progressing     Problem: SAFETY ADULT  Goal: Patient will remain free of falls  Description: INTERVENTIONS:- Educate patient/family on patient safety including physical limitations- Instruct patient to call for assistance with activity - Consult OT/PT to assist with strengthening/mobility - Keep Call bell within reach- Keep bed low and locked with side rails adjusted as appropriate- Keep care items and personal belongings within reach- Initiate and maintain comfort rounds- Make Fall Risk Sign visible to staff- Offer Toileting every 2 Hours, in advance of need- Initiate/Maintain bed alarm-  Obtain necessary fall risk management equipment. Apply yellow socks and bracelet for high fall risk patients- Consider moving patient to room near nurses station  Outcome: Progressing  Goal: Maintain or return to baseline ADL function  Description: INTERVENTIONS:-  Assess patient's ability to carry out ADLs; assess patient's baseline for ADL function and identify physical deficits which impact ability to perform ADLs (bathing, care of mouth/teeth, toileting, grooming, dressing, etc.)- Assess/evaluate cause of self-care deficits - Assess range of motion- Assess patient's mobility; develop plan if impaired- Assess patient's need for assistive devices and provide as appropriate- Encourage maximum independence but intervene and supervise when necessary- Involve family in performance of ADLs- Assess for home care needs following discharge - Consider OT consult to assist with ADL evaluation and planning for discharge- Provide patient education as appropriate  Outcome: Progressing  Goal: Maintains/Returns to pre admission functional level  Description: INTERVENTIONS:- Perform AM-PAC 6 Click Basic Mobility/ Daily Activity assessment daily.- Set and communicate daily mobility goal to care team and patient/family/caregiver. - Collaborate with rehabilitation services on mobility goals if consulted- Perform Range of Motion 2 times a day.- Reposition patient every 2 hours.- Dangle patient 2 times a day- Stand patient 3 times a day- Ambulate patient 3 times a day- Out of bed to chair 3 times a day - Out of bed for meals 3 times a day- Out of bed for toileting- Record patient progress and toleration of activity level   Outcome: Progressing     Problem: DISCHARGE PLANNING  Goal: Discharge to home or other facility with appropriate resources  Description: INTERVENTIONS:- Identify barriers to discharge w/patient and caregiver- Arrange for needed discharge resources and transportation as appropriate- Identify discharge learning needs  (meds, wound care, etc.)- Arrange for interpretive services to assist at discharge as needed- Refer to Case Management Department for coordinating discharge planning if the patient needs post-hospital services based on physician/advanced practitioner order or complex needs related to functional status, cognitive ability, or social support system  Outcome: Progressing     Problem: Knowledge Deficit  Goal: Patient/family/caregiver demonstrates understanding of disease process, treatment plan, medications, and discharge instructions  Description: Complete learning assessment and assess knowledge base.Interventions:- Provide teaching at level of understanding- Provide teaching via preferred learning methods  Outcome: Progressing

## 2025-04-14 NOTE — ASSESSMENT & PLAN NOTE
-Baseline creatinine around 1 mg/dL  -Current creatinine 1.30 mg/dL on 4/14  -Etiology: Most likely prerenal due to volume depletion.   -UA: with trace ketones, trace blood, small leukocytes, 1+ bilirubin, 4-10 WBC, moderate epithelial cells, innumerable bacteria, 1-2 hyaline casts, /=4/13.  Urine creatinine 127.5, urine sodium <10, FeNa 0.1% prerenal.    -Renal imaging: with few punctate nonobstructing stones in each kidney. No hydronephroureter, 4/13.   -Holding spironolactone for now given acute kidney injury  -Avoid hypotension, avoid nephrotoxins, avoid NSAIDS  -Trend BMP

## 2025-04-14 NOTE — CASE MANAGEMENT
Case Management Progress Note    Patient name Brittani Patino  Location /422-01 MRN [id number]  : 1985 Date 2025       LOS (days): 1  Geometric Mean LOS (GMLOS) (days):   Days to GMLOS:        OBJECTIVE:        Current admission status: Inpatient  Preferred Pharmacy:   [facility]/pharmacy #[id number] [place], [place] - [address]  [address] [facility] [address]  Phone: [phone] Fax: [phone]    [facility]/pharmacy #[id number]  [place] - [address]  [address] [facility] [address]  Phone: [phone] Fax: [phone]    [facility]/pharmacy #[id number] [place], [place] - [address]  [address]  [facility] [address]  Phone: [phone] Fax: [phone]    Primary Care Provider: Clarence Ingram MD    Primary Insurance: [employer]  Secondary Insurance:     PROGRESS NOTE:chart reviewed. No current discharge needs noted. Cm following.

## 2025-04-14 NOTE — QUICK NOTE
Potassium 2.5 this morning, was previously getting IV potassium 40 q 6 last hospitalization. Will defer to primary/rounder this am.

## 2025-04-14 NOTE — ASSESSMENT & PLAN NOTE
Presented with AG16. Hx of elevated AG 16 at outpatient setting  Could be starvation ketosis, patient has minimal oral intake in past 2-3 days, with multiple vomiting episodes  Compensated metabolic acidosis on VBG likely due to renal failure vs starvation ketosis  Normal lactic acid; Gap closed on 04/14  - monitor am labs

## 2025-04-14 NOTE — ASSESSMENT & PLAN NOTE
Patient has had instances of hypokalemia in the past that has led to hospitalization.  She is followed by IV nephrologists and has had full workup in the past and requires daily infusions of potassium due to poor oral absorption.  The main etiology of the patient's hypokalemia in the chronic setting is lack of GI absorption along with increased losses via the GI tract/chronic diarrhea from gastric bypass.  She has had minimal urine losses of potassium.  During the past 3 days, she started to feel ill after developing a sore throat and hoarse voice for which she was seen in the ER and diagnosed with acute laryngeal pharyngitis.  Prior to this, her potassiums have been normal but recently they have been trending down and this acute illness may be the reason why.  On top of having nausea, vomiting and poor p.o. intake this likely threw off her potassium balance.  Currently her potassium level is at 2.5.  Etiology of the acute hypokalemia is most likely due to GI losses from diarrhea along with vomiting which can cause low potassium from other mechanisms.  Current plan is to monitor patient on telemetry given her symptoms of numbness/tingling in her face and chest pressure and provide IV potassium chloride infusions.      April 14: Potassium 2.5 mmol/L at 04:45 AM with potassium chloride 20 mEq x 1 IV and 40 mEq twice daily PO ordered. Repeat K 3.2 mmol/L at 14:16 pm. Continue oral Kchlor 40 mEq twice daily. Recheck BMP in AM.

## 2025-04-14 NOTE — ASSESSMENT & PLAN NOTE
Generalized weakness for past 2-3 days.   Patient was on Augmentin and medrol dose pack for laryngitis and didn't tolerate PO diet at home for last few days. Multiple nausea and vomiting episodes. Notices dysuria on 04/13  Fatigue likely 2/2 recent dehydration and URI or UTI  In ED, found to have electrolyte displacement, potassium 2.5 and sodium 131    - patient has slight improvement with fatigue on 04/14  - replete potassium and sodium as needed   - see hypokalemia A/P  - f/u Ucx  - transition from clear liquid to regular diet  - IV zofran prn for nausea

## 2025-04-14 NOTE — ASSESSMENT & PLAN NOTE
History of gastric bypass in 2014, w/ L chest port for infusion  Recurrent electrolyte displacement for many years.   On daily potassium infusion, monthly vitamin B12 injection and weekly vitamin D 81433F oral supplements  - Avoid NSAIDs   - f/u GI outpatient

## 2025-04-14 NOTE — PROGRESS NOTES
Progress Note - Nephrology   Name: Brittani Patino 40 y.o. female I MRN: [id number]  Unit/Bed#: 422-01 I Date of Admission: 4/13/2025   Date of Service: 4/14/2025 I Hospital Day: 1    Assessment & Plan  Weakness    Hypokalemia  Patient has had instances of hypokalemia in the past that has led to hospitalization.  She is followed by IV nephrologists and has had full workup in the past and requires daily infusions of potassium due to poor oral absorption.  The main etiology of the patient's hypokalemia in the chronic setting is lack of GI absorption along with increased losses via the GI tract/chronic diarrhea from gastric bypass.  She has had minimal urine losses of potassium.  During the past 3 days, she started to feel ill after developing a sore throat and hoarse voice for which she was seen in the ER and diagnosed with acute laryngeal pharyngitis.  Prior to this, her potassiums have been normal but recently they have been trending down and this acute illness may be the reason why.  On top of having nausea, vomiting and poor p.o. intake this likely threw off her potassium balance.  Currently her potassium level is at 2.5.  Etiology of the acute hypokalemia is most likely due to GI losses from diarrhea along with vomiting which can cause low potassium from other mechanisms.  Current plan is to monitor patient on telemetry given her symptoms of numbness/tingling in her face and chest pressure and provide IV potassium chloride infusions.      April 14: Potassium 2.5 mmol/L at 04:45 AM with potassium chloride 20 mEq x 1 IV and 40 mEq twice daily PO ordered. Repeat K 3.2 mmol/L at 14:16 pm. Continue oral Kchlor 40 mEq twice daily. Recheck BMP in AM.     Hx of gastric bypass    TICO (acute kidney injury) (HCC)  -Baseline creatinine around 1 mg/dL  -Current creatinine 1.30 mg/dL on 4/14  -Etiology: Most likely prerenal due to volume depletion.   -UA: with trace ketones, trace blood, small leukocytes, 1+ bilirubin, 4-10 WBC,  moderate epithelial cells, innumerable bacteria, 1-2 hyaline casts, /=4/13.  Urine creatinine 127.5, urine sodium <10, FeNa 0.1% prerenal.    -Renal imaging: with few punctate nonobstructing stones in each kidney. No hydronephroureter, 4/13.   -Holding spironolactone for now given acute kidney injury  -Avoid hypotension, avoid nephrotoxins, avoid NSAIDS  -Trend BMP  Hx of anxiety disorder    Stage 3 chronic kidney disease (HCC)  Lab Results   Component Value Date    EGFR 51 04/14/2025    EGFR 47 04/14/2025    EGFR 33 04/13/2025    CREATININE 1.30 04/14/2025    CREATININE 1.38 (H) 04/14/2025    CREATININE 1.84 (H) 04/13/2025   Baseline creatinine around 1.0 mg/dL  Migraine    UTI (urinary tract infection)  UA as noted above. Currently receiving ceftriaxone. Continue management per hospitalist.     I have reviewed the nephrology recommendations including potassium and creatinine trends with hospitalist and we are in agreement with renal plan including the information outlined above.     Subjective   Brief History of Admission - Brittani Patino is a 40 y.o. year old female nephrolithiasis, CKD 3A, gastric bypass, resistant hypokalemia who was admitted to [facility] after presenting with generalized weakness and malaise.  Her symptoms started about 3 to 4 days ago when she started to feel weak and worn out and did not eat anything during this timeframe due to nausea and inability to keep food down.  She reports having a fever the last 2 days and chills.  She was in the urgent care on April 10 and diagnosed with laryngitis and prescribed steroids.  She is still complaining of nausea and poor appetite.  She has been receiving her regularly scheduled overnight potassium infusions.  Nephrology is following for assistance in the management of TICO and hypokalemia.    Patient is examined resting in bed. States she continue to have mild nausea and loose stool but both are improving.      Objective :  Temp:  [97.3 °F (36.3 °C)-98.8 °F  (37.1 °C)] 98.8 °F (37.1 °C)  HR:  [74-91] 85  BP: (104-123)/(55-79) 104/55  Resp:  [16-20] 20  SpO2:  [97 %-99 %] 99 %  O2 Device: None (Room air)    Current Weight: Weight - Scale: 57.3 kg (126 lb 5.2 oz)  First Weight: Weight - Scale: 56.9 kg (125 lb 7.1 oz)  I/O         04/12 0701  04/13 0700 04/13 0701  04/14 0700 04/14 0701  04/15 0700    P.O.  1200 960    I.V. (mL/kg)  1000 (17.5)     IV Piggyback  1300     Total Intake(mL/kg)  3500 (61.1) 960 (16.8)    Net  +3500 +960           Unmeasured Urine Occurrence  3 x 2 x    Unmeasured Stool Occurrence  1 x           Physical Exam  Vitals and nursing note reviewed.   Constitutional:       General: She is not in acute distress.     Appearance: Normal appearance. She is well-developed and normal weight. She is ill-appearing.   HENT:      Head: Normocephalic and atraumatic.      Right Ear: External ear normal.      Left Ear: External ear normal.      Nose: Nose normal.      Mouth/Throat:      Mouth: Mucous membranes are moist.      Pharynx: Oropharynx is clear.   Eyes:      Extraocular Movements: Extraocular movements intact.      Conjunctiva/sclera: Conjunctivae normal.      Pupils: Pupils are equal, round, and reactive to light.   Cardiovascular:      Rate and Rhythm: Normal rate and regular rhythm.      Heart sounds: Normal heart sounds. No murmur heard.  Pulmonary:      Effort: Pulmonary effort is normal. No respiratory distress.      Breath sounds: Normal breath sounds.   Abdominal:      Palpations: Abdomen is soft.      Tenderness: There is no abdominal tenderness.   Musculoskeletal:         General: No swelling.      Cervical back: Neck supple.      Right lower leg: No edema.      Left lower leg: No edema.   Skin:     General: Skin is warm and dry.      Capillary Refill: Capillary refill takes less than 2 seconds.   Neurological:      General: No focal deficit present.      Mental Status: She is alert and oriented to person, place, and time.   Psychiatric:          Mood and Affect: Mood normal.         Behavior: Behavior normal.       Medications:    Current Facility-Administered Medications:     acetaminophen (TYLENOL) tablet 650 mg, 650 mg, Oral, Q6H PRN, Mallory Gonsalez DO    amitriptyline (ELAVIL) tablet 75 mg, 75 mg, Oral, HS, Mallory Gonsalez DO, 75 mg at 04/13/25 2211    cefTRIAXone (ROCEPHIN) IVPB (premix in dextrose) 1,000 mg 50 mL, 1,000 mg, Intravenous, Q24H, Khushi Garcia MD, Last Rate: 100 mL/hr at 04/14/25 0756, 1,000 mg at 04/14/25 0756    escitalopram (LEXAPRO) tablet 20 mg, 20 mg, Oral, HS, Mallory Gonsalez DO, 20 mg at 04/13/25 2211    heparin (porcine) subcutaneous injection 5,000 Units, 5,000 Units, Subcutaneous, Q8H MIKE, Khushi Garcia MD, 5,000 Units at 04/13/25 1415    meclizine (ANTIVERT) tablet 12.5 mg, 12.5 mg, Oral, TID PRN, Mallory Gonsalez DO    ondansetron (ZOFRAN) injection 4 mg, 4 mg, Intravenous, Q4H PRN, Khushi Garcia MD, 4 mg at 04/14/25 0839    potassium chloride (Klor-Con M20) CR tablet 40 mEq, 40 mEq, Oral, BID, Khushi Garcia MD, 40 mEq at 04/14/25 1141    sodium chloride 0.9 % with KCl 20 mEq/L infusion (premix), 125 mL/hr, Intravenous, Continuous, Mallory Gonsalez DO, Last Rate: 125 mL/hr at 04/14/25 0456, 125 mL/hr at 04/14/25 0456      Lab Results: I have reviewed the following results:  Results from last 7 days   Lab Units 04/14/25  1416 04/14/25  0630 04/14/25  0456 04/13/25  1418 04/13/25  0858 04/10/25  0843   WBC Thousand/uL  --  6.36  --   --  13.74*  --    HEMOGLOBIN g/dL  --  9.5*  --   --  14.0  --    HEMATOCRIT %  --  30.1*  --   --  43.2  --    PLATELETS Thousands/uL  --  276  --  361 449*  --    POTASSIUM mmol/L 3.2*  --  2.5* 2.9* 2.5* 3.2*   CHLORIDE mmol/L 106  --  107 104 96 101   CO2 mmol/L 21  --  23 20* 19* 22   BUN mg/dL 17  --  23 35* 39* 16   CREATININE mg/dL 1.30  --  1.38* 1.84* 2.16* 1.24   CALCIUM mg/dL 7.3*  --  7.5* 7.8* 9.1 9.1   MAGNESIUM mg/dL  --   --  2.2  --  2.7  --    PHOSPHORUS mg/dL  --   --  3.1  --  5.1*  --   "  ALBUMIN g/dL  --   --  3.5  --  4.9  --        Administrative Statements     Portions of the record may have been created with voice recognition software. Occasional wrong word or \"sound a like\" substitutions may have occurred due to the inherent limitations of voice recognition software. Read the chart carefully and recognize, using context, where substitutions have occurred.If you have any questions, please contact the dictating provider.  "

## 2025-04-14 NOTE — UTILIZATION REVIEW
Initial Clinical Review    Admission: Date/Time/Statement:   Admission Orders (From admission, onward)       Ordered        04/13/25 1211  INPATIENT ADMISSION  Once                          Orders Placed This Encounter   Procedures    INPATIENT ADMISSION     Standing Status:   Standing     Number of Occurrences:   1     Level of Care:   Med Surg [16]     Estimated length of stay:   More than 2 Midnights     Certification:   I certify that inpatient services are medically necessary for this patient for a duration of greater than two midnights. See H&P and MD Progress Notes for additional information about the patient's course of treatment.     ED Arrival Information       Expected   -    Arrival   4/13/2025 08:42    Acuity   Urgent              Means of arrival   Wheelchair    Escorted by   Spouse    Service   Hospitalist    Admission type   Emergency              Arrival complaint   Hypoglycemia, Vomiting             Chief Complaint   Patient presents with    Weakness - Generalized     Patient reports generalized weakness/pain, fatigue, and vomiting. Patient gets daily K infusions.         Initial Presentation: 40 y.o. female  w/ PMH of CKD stage 3, gastric bypass, recurrently hypokalemia, chronic anemia, migraine, and Vit B12 and D deficiency, Psychiatric disorder who presented from home to [facility] ED. Admitted as Inpatient for evaluation and treatment of Weakness, Hypokalemia, TICO, Increased anion gap metabolic acidosis.     Presented w/ generalized fatigue with N/V for the past 2-3 days. Patient has laryngitis for the past 2-3 days, and she was on augmentin and medrol dose pack for the past few days. She reported not tolerating oral diet at home due to nausea and vomiting. She has regular bowel movement but notices dysuria this morning.  Pt requires daily potassium injections at home. On exam, Pt tachycardic, No focal deficits. Labs Na 131, K 2.5, Anion gap 16, CO2 19, Bun/Creat 39/2.16, T protein 8.6,  Phos 5.1, WBC 13.74, Platelet 449. CT abd: Diffuse fluid distention of the large bowel. Diagnostic considerations include infectious/inflammatory diarrheal state, malabsorption. Please see below med list for meds given in the ED.     Plan: Monitor on Tele, Replete K as needed, Check BMP in afternoon and am. Hold Aldactone. Check urine studies, Avoid nephrotoxic agents, IV fluids. Check VBG, Lactic acid. F/U UA and urine cx, CLD, IV Zofran PRN, add Protonix. Monitor am labs.  Nephrology consulted.    Nephrology:  TICO. Baseline creat around 1. Current creat 2.16. UA pending, Renal imaging pending. Hold spironolactone for now. Repeat BMP in afternoon, Avoid hypotension, nephrotoxins and NSAIDs. IV fluids, Trend BMP.     Anticipated Length of Stay/Certification Statement: Patient will be admitted on an inpatient basis with an anticipated length of stay of greater than 2 midnights secondary to generalized weakness 2/2 electrolyte displacement and TICO requiring IV electrolyte replacement and fluids.     Date: 4/14/25   Day 2:   Pt reports feeling a bit better in terms of fatigue this am. Pt is tolerating luids and ready to try solid. Reports dysuria since yesterday. Abnormal labs: Na 133, K 3.2, Ca 7.3, H/H 9.5/30.1. UA shows UTI. Compensated metabolic acidosis on VBG likely due to renal failure vs starvation ketosis. Gap closed 4/14. Plan: Tele, Replete K and Na as needed. F/U urine culture. Start regular diet, IV Zofran PRN, Hold Aldactone, Continue IV fluids, Avoid nephrotoxic agents. Start IV Ceftriaxone. Monitor symptoms and am labs..     Certification Statement: The patient will continue to require additional inpatient hospital stay due to weakness 2/2 electrolyte displacement and TICO requiring electrolyte repletion and IV fluids     ED Treatment-Medication Administration from 04/13/2025 0841 to 04/13/2025 1243         Date/Time Order Dose Route Action     04/13/2025 0859 droperidol (INAPSINE) injection 0.625 mg  0.625 mg Intravenous Given     04/13/2025 0859 sodium chloride 0.9 % bolus 1,000 mL 1,000 mL Intravenous New Bag     04/13/2025 0903 acetaminophen (Ofirmev) injection 1,000 mg 1,000 mg Intravenous New Bag     04/13/2025 0954 potassium chloride 20 mEq IVPB (premix) 20 mEq Intravenous New Bag     04/13/2025 1102 potassium chloride 20 mEq IVPB (premix) 20 mEq Intravenous New Bag            Scheduled Medications:  amitriptyline, 75 mg, Oral, HS  cefTRIAXone, 1,000 mg, Intravenous, Q24H  escitalopram, 20 mg, Oral, HS  heparin (porcine), 5,000 Units, Subcutaneous, Q8H MIKE  potassium chloride, 40 mEq, Oral, BID  potassium chloride, 40 mEq, Oral, Once  fluconazole (DIFLUCAN) tablet 200 mg  Dose: 200 mg  Freq: Once Route: PO  Start: 04/14/25 0515 End: 04/14/25 0519  potassium chloride 20 mEq IVPB (premix)  Dose: 20 mEq  Freq: Every 2 hours Route: IV  Last Dose: 20 mEq (04/14/25 1006)  Start: 04/14/25 0800 End: 04/14/25 1206    Continuous IV Infusions:  sodium chloride 0.9 % with KCl 20 mEq/L, 125 mL/hr, Intravenous, Continuous      PRN Meds:  acetaminophen, 650 mg, Oral, Q6H PRN  meclizine, 12.5 mg, Oral, TID PRN  ondansetron, 4 mg, Intravenous, Q4H PRN- given x1 4/13, x1 4/14      ED Triage Vitals [04/13/25 0849]   Temperature Pulse Respirations Blood Pressure SpO2 Pain Score   (!) 97.1 °F (36.2 °C) 105 20 126/66 100 % 6     Weight (last 2 days)       Date/Time Weight    04/13/25 1300 57.3 (126.32)    04/13/25 0849 56.9 (125.44)            Vital Signs (last 3 days)       Date/Time Temp Pulse Resp BP MAP (mmHg) SpO2 O2 Device Patient Position - Orthostatic VS [place] Coma Scale Score Pain    04/14/25 14:18:14 98.8 °F (37.1 °C) 85 20 104/55 71 99 % -- -- -- --    04/14/25 0900 -- -- -- -- -- -- -- -- -- No Pain    04/14/25 07:24:51 97.3 °F (36.3 °C) 74 19 106/63 77 99 % -- -- -- --    04/14/25 0100 -- -- -- -- -- 99 % None (Room air) -- 15 No Pain    04/13/25 21:52:11 98.8 °F (37.1 °C) 76 19 105/55 72 98 % None (Room air)  Sitting -- --    04/13/25 18:35:13 98.5 °F (36.9 °C) 91 16 123/79 94 97 % -- -- -- --    04/13/25 1458 97.7 °F (36.5 °C) -- -- -- -- -- None (Room air) -- 15 No Pain    04/13/25 1300 97.7 °F (36.5 °C) 81 16 145/82 103 98 % None (Room air) Lying -- --    04/13/25 12:58:18 97.7 °F (36.5 °C) 81 -- 145/82 103 97 % -- -- -- --    04/13/25 1258 -- -- -- -- -- -- -- -- -- No Pain    04/13/25 1200 -- 84 16 129/64 91 100 % None (Room air) Lying -- 3    04/13/25 1000 -- 83 18 136/62 89 99 % None (Room air) Lying -- --    04/13/25 0930 -- 85 20 129/62 89 100 % None (Room air) Lying -- --    04/13/25 0911 -- -- -- -- -- -- None (Room air) -- 15 6    04/13/25 0849 97.1 °F (36.2 °C) 105 20 126/66 -- 100 % None (Room air) Lying -- 6              Pertinent Labs/Diagnostic Test Results:   Radiology:  CT abdomen pelvis wo contrast   Final Interpretation by Marty Florentino MD (04/13 1401)      Diffuse fluid distention of the large bowel. Diagnostic considerations include infectious/inflammatory diarrheal state, malabsorption.      Nephrolithiasis.      Workstation performed: [id number]                 Results from last 7 days   Lab Units 04/13/25  1457   SARS-COV-2  Not Detected     Results from last 7 days   Lab Units 04/14/25  0630 04/13/25  1418 04/13/25  0858   WBC Thousand/uL 6.36  --  13.74*   HEMOGLOBIN g/dL 9.5*  --  14.0   HEMATOCRIT % 30.1*  --  43.2   PLATELETS Thousands/uL 276 361 449*   TOTAL NEUT ABS Thousands/µL  --   --  11.32*         Results from last 7 days   Lab Units 04/14/25  1416 04/14/25  0456 04/13/25  1418 04/13/25  0858 04/10/25  0843   SODIUM mmol/L 133* 138 135 131* 135   POTASSIUM mmol/L 3.2* 2.5* 2.9* 2.5* 3.2*   CHLORIDE mmol/L 106 107 104 96 101   CO2 mmol/L 21 23 20* 19* 22   ANION GAP mmol/L 6 8 11 16* 12   BUN mg/dL 17 23 35* 39* 16   CREATININE mg/dL 1.30 1.38* 1.84* 2.16* 1.24   EGFR ml/min/1.73sq m 51 47 33 27 54   CALCIUM mg/dL 7.3* 7.5* 7.8* 9.1 9.1   MAGNESIUM mg/dL  --  2.2  --  2.7  --     PHOSPHORUS mg/dL  --  3.1  --  5.1*  --      Results from last 7 days   Lab Units 04/14/25  0456 04/13/25  0858   AST U/L 14 18   ALT U/L 11 17   ALK PHOS U/L 42 61   TOTAL PROTEIN g/dL 5.9* 8.6*   ALBUMIN g/dL 3.5 4.9   TOTAL BILIRUBIN mg/dL 0.37 0.44     Results from last 7 days   Lab Units 04/13/25  0907   POC GLUCOSE mg/dl 146*     Results from last 7 days   Lab Units 04/14/25  1416 04/14/25  0456 04/13/25  1418 04/13/25  0858 04/10/25  0843   GLUCOSE RANDOM mg/dL 89 89 100 157* 102     Beta- Hydroxybutyrate   Date Value Ref Range Status   04/13/2025 0.28 (H) 0.02 - 0.27 mmol/L Final   01/09/2025 0.20 0.02 - 0.27 mmol/L Final     BETA-HYDROXYBUTYRATE   Date Value Ref Range Status   09/02/2022 0.4 <0.6 mmol/L Final          Results from last 7 days   Lab Units 04/13/25  1509   PH ALYCE  7.284*   PCO2 ALYCE mm Hg 40.2*   PO2 ALYCE mm Hg 39.4   HCO3 ALYCE mmol/L 18.6*   BASE EXC ALYCE mmol/L -7.5   O2 CONTENT ALYCE ml/dL 11.8   O2 HGB, VENOUS % 65.5     Results from last 7 days   Lab Units 04/13/25  0858   TSH 3RD GENERATON uIU/mL 5.153*         Results from last 7 days   Lab Units 04/13/25  1418   LACTIC ACID mmol/L 0.8       Results from last 7 days   Lab Units 04/13/25  0858   LIPASE u/L 40     Results from last 7 days   Lab Units 04/13/25  1418   CLARITY UA  Turbid   COLOR UA  Yellow   SPEC GRAV UA  1.025   PH UA  6.5   GLUCOSE UA mg/dl Negative   KETONES UA mg/dl Trace*   BLOOD UA  Trace*   PROTEIN UA mg/dl 30 (1+)*   NITRITE UA  Negative   BILIRUBIN UA  Negative   UROBILINOGEN UA (BE) mg/dl <2.0   LEUKOCYTES UA  Small*   WBC UA /hpf 4-10*   RBC UA /hpf 2-4   BACTERIA UA /hpf Innumerable*   EPITHELIAL CELLS WET PREP /hpf Moderate*   SODIUM UR mmol/L <10.0   CREATININE UR mg/dL 127.5     Results from last 7 days   Lab Units 04/13/25  1457   INFLUENZA B  Not Detected   RESPIRATORY SYNCYTIAL VIRUS  Not Detected     Results from last 7 days   Lab Units 04/13/25  1457   ADENOVIRUS  Not Detected   BORDETELLA PARAPERTUSSIS   Not Detected   BORDETELLA PERTUSSIS  Not Detected   CHLAMYDIA PNEUMONIAE  Not Detected   CORONAVIRUS 229E  Not Detected   CORONAVIRUS HKU1  Not Detected   CORONAVIRUS NL63  Not Detected   CORONAVIRUS OC43  Not Detected   METAPNEUMOVIRUS  Not Detected   RHINOVIRUS  Not Detected   MYCOPLASMA PNEUMONIAE  Not Detected   PARAINFLUENZA 1  Not Detected   PARAINFLUENZA 2  Not Detected   PARAINFLUENZA 3  Not Detected   PARAINFLUENZA 4  Not Detected         Past Medical History:   Diagnosis Date    C. difficile colitis 2016    COVID-19     1/2022- pt denies long covid    DUB (dysfunctional uterine bleeding)     H. pylori infection     Hypertension     Hypokalemia     Kidney stone November 2023    Left lower quadrant abdominal pain 08/17/2020    Migraine     WAGNER (obstructive sleep apnea) 06/11/2014    Pancreatitis     Psychiatric disorder     Anxiety    Rectal bleeding     Renal disorder     Rhabdomyolysis     Sleep apnea     resolved with bariatric surgery    Thrombosed external hemorrhoid      Present on Admission:   Hypokalemia   TICO (acute kidney injury) (HCC)   (Resolved) Increased anion gap metabolic acidosis   Stage 3 chronic kidney disease (HCC)   (Resolved) Hyponatremia      Admitting Diagnosis: Hypokalemia [E87.6]  Weakness [R53.1]  TICO (acute kidney injury) (HCC) [N17.9]  High anion gap metabolic acidosis [E87.29]  Age/Sex: 40 y.o. female    Network Utilization Review Department  ATTENTION: Please call with any questions or concerns to [phone] and carefully listen to the prompts so that you are directed to the right person. All voicemails are confidential.   For Discharge needs, contact Care Management DC Support Team at [phone] opt. 2  Send all requests for admission clinical reviews, approved or denied determinations and any other requests to dedicated fax number below belonging to the campus where the patient is receiving treatment. List of dedicated fax numbers for the Facilities:  FACILITY NAME UR FAX  NUMBER   ADMISSION DENIALS (Administrative/Medical Necessity) [phone]   DISCHARGE SUPPORT TEAM (NETWORK) [phone]   PARENT CHILD HEALTH (Maternity/NICU/Pediatrics) [phone]   [facility] [phone]   [facility] [phone]   [facility] [phone]   General acute hospital [phone]   Pending sale to [facility] [phone]   [facility] [phone]   [facility] [phone]   [facility] [phone]   [facility] [phone]   [facility] [phone]   [facility] [phone]   [facility] [phone]

## 2025-04-14 NOTE — NURSING NOTE
This nurse received secure chat message from Terrie in lab stating pt's CBC is reflecting a drop in pt's hemoglobin to 9.9. This nurse asked lab to submit for redraw to confirm.Pt reports having an episode of diarrhea last night but reports the stool was brown in color- denies any black/tarry or red streaks. Denies vomiting.

## 2025-04-14 NOTE — QUICK NOTE
Giving one dose of sodium bicarbonate for a pH of 7.28. anion gap closed and is likely having bicarbonate losses from the stool

## 2025-04-14 NOTE — ASSESSMENT & PLAN NOTE
Pre-renal TICO on urine studies  Cr elevated 2.16, baseline around 1 to 1.1  Trending down 1.38  - continue IV fluids  - avoid nephrotoxic agents  - nephro consulted and recs appreciated

## 2025-04-15 PROBLEM — N17.9 AKI (ACUTE KIDNEY INJURY) (HCC): Status: RESOLVED | Noted: 2018-12-18 | Resolved: 2025-04-15

## 2025-04-15 LAB
ANION GAP SERPL CALCULATED.3IONS-SCNC: 6 MMOL/L (ref 4–13)
ANION GAP SERPL CALCULATED.3IONS-SCNC: 7 MMOL/L (ref 4–13)
ATRIAL RATE: 98 BPM
BASOPHILS # BLD AUTO: 0.04 THOUSANDS/ÂΜL (ref 0–0.1)
BASOPHILS NFR BLD AUTO: 1 % (ref 0–1)
BUN SERPL-MCNC: 12 MG/DL (ref 5–25)
BUN SERPL-MCNC: 12 MG/DL (ref 5–25)
CALCIUM SERPL-MCNC: 7.3 MG/DL (ref 8.4–10.2)
CALCIUM SERPL-MCNC: 7.4 MG/DL (ref 8.4–10.2)
CHLORIDE SERPL-SCNC: 107 MMOL/L (ref 96–108)
CHLORIDE SERPL-SCNC: 108 MMOL/L (ref 96–108)
CO2 SERPL-SCNC: 22 MMOL/L (ref 21–32)
CO2 SERPL-SCNC: 23 MMOL/L (ref 21–32)
CREAT SERPL-MCNC: 1.05 MG/DL (ref 0.6–1.3)
CREAT SERPL-MCNC: 1.18 MG/DL (ref 0.6–1.3)
EOSINOPHIL # BLD AUTO: 0.06 THOUSAND/ÂΜL (ref 0–0.61)
EOSINOPHIL NFR BLD AUTO: 1 % (ref 0–6)
ERYTHROCYTE [DISTWIDTH] IN BLOOD BY AUTOMATED COUNT: 15.9 % (ref 11.6–15.1)
FERRITIN SERPL-MCNC: 7 NG/ML (ref 30–307)
GFR SERPL CREATININE-BSD FRML MDRD: 57 ML/MIN/1.73SQ M
GFR SERPL CREATININE-BSD FRML MDRD: 66 ML/MIN/1.73SQ M
GLUCOSE SERPL-MCNC: 113 MG/DL (ref 65–140)
GLUCOSE SERPL-MCNC: 76 MG/DL (ref 65–140)
HCT VFR BLD AUTO: 28.2 % (ref 34.8–46.1)
HGB BLD-MCNC: 8.8 G/DL (ref 11.5–15.4)
IMM GRANULOCYTES # BLD AUTO: 0.02 THOUSAND/UL (ref 0–0.2)
IMM GRANULOCYTES NFR BLD AUTO: 0 % (ref 0–2)
IRON SATN MFR SERPL: 10 % (ref 15–50)
IRON SERPL-MCNC: 34 UG/DL (ref 50–212)
LYMPHOCYTES # BLD AUTO: 1.65 THOUSANDS/ÂΜL (ref 0.6–4.47)
LYMPHOCYTES NFR BLD AUTO: 28 % (ref 14–44)
MAGNESIUM SERPL-MCNC: 2 MG/DL (ref 1.9–2.7)
MCH RBC QN AUTO: 25.3 PG (ref 26.8–34.3)
MCHC RBC AUTO-ENTMCNC: 31.2 G/DL (ref 31.4–37.4)
MCV RBC AUTO: 81 FL (ref 82–98)
MONOCYTES # BLD AUTO: 0.53 THOUSAND/ÂΜL (ref 0.17–1.22)
MONOCYTES NFR BLD AUTO: 9 % (ref 4–12)
NEUTROPHILS # BLD AUTO: 3.65 THOUSANDS/ÂΜL (ref 1.85–7.62)
NEUTS SEG NFR BLD AUTO: 61 % (ref 43–75)
NRBC BLD AUTO-RTO: 0 /100 WBCS
P AXIS: 78 DEGREES
PHOSPHATE SERPL-MCNC: 2.2 MG/DL (ref 2.7–4.5)
PLATELET # BLD AUTO: 258 THOUSANDS/UL (ref 149–390)
PMV BLD AUTO: 10.5 FL (ref 8.9–12.7)
POTASSIUM SERPL-SCNC: 2.7 MMOL/L (ref 3.5–5.3)
POTASSIUM SERPL-SCNC: 3.4 MMOL/L (ref 3.5–5.3)
PR INTERVAL: 130 MS
QRS AXIS: 56 DEGREES
QRSD INTERVAL: 104 MS
QT INTERVAL: 452 MS
QTC INTERVAL: 577 MS
RBC # BLD AUTO: 3.48 MILLION/UL (ref 3.81–5.12)
SODIUM SERPL-SCNC: 135 MMOL/L (ref 135–147)
SODIUM SERPL-SCNC: 138 MMOL/L (ref 135–147)
T WAVE AXIS: 96 DEGREES
TIBC SERPL-MCNC: 340.2 UG/DL (ref 250–450)
TRANSFERRIN SERPL-MCNC: 243 MG/DL (ref 203–362)
UIBC SERPL-MCNC: 306 UG/DL (ref 155–355)
VENTRICULAR RATE: 98 BPM
WBC # BLD AUTO: 5.95 THOUSAND/UL (ref 4.31–10.16)

## 2025-04-15 PROCEDURE — 82728 ASSAY OF FERRITIN: CPT

## 2025-04-15 PROCEDURE — 83540 ASSAY OF IRON: CPT

## 2025-04-15 PROCEDURE — 99232 SBSQ HOSP IP/OBS MODERATE 35: CPT

## 2025-04-15 PROCEDURE — 85025 COMPLETE CBC W/AUTO DIFF WBC: CPT

## 2025-04-15 PROCEDURE — 84100 ASSAY OF PHOSPHORUS: CPT

## 2025-04-15 PROCEDURE — 83550 IRON BINDING TEST: CPT

## 2025-04-15 PROCEDURE — 80048 BASIC METABOLIC PNL TOTAL CA: CPT | Performed by: FAMILY MEDICINE

## 2025-04-15 PROCEDURE — 83735 ASSAY OF MAGNESIUM: CPT

## 2025-04-15 PROCEDURE — 99232 SBSQ HOSP IP/OBS MODERATE 35: CPT | Performed by: FAMILY MEDICINE

## 2025-04-15 PROCEDURE — 93010 ELECTROCARDIOGRAM REPORT: CPT | Performed by: INTERNAL MEDICINE

## 2025-04-15 PROCEDURE — 80048 BASIC METABOLIC PNL TOTAL CA: CPT

## 2025-04-15 RX ORDER — POTASSIUM CHLORIDE 14.9 MG/ML
20 INJECTION INTRAVENOUS
Status: COMPLETED | OUTPATIENT
Start: 2025-04-15 | End: 2025-04-16

## 2025-04-15 RX ORDER — POTASSIUM CHLORIDE 1500 MG/1
40 TABLET, EXTENDED RELEASE ORAL EVERY 6 HOURS
Status: DISCONTINUED | OUTPATIENT
Start: 2025-04-15 | End: 2025-04-15

## 2025-04-15 RX ORDER — POTASSIUM CHLORIDE 14.9 MG/ML
20 INJECTION INTRAVENOUS
Status: DISCONTINUED | OUTPATIENT
Start: 2025-04-15 | End: 2025-04-15

## 2025-04-15 RX ORDER — SODIUM CHLORIDE AND POTASSIUM CHLORIDE 300; 900 MG/100ML; MG/100ML
100 INJECTION, SOLUTION INTRAVENOUS CONTINUOUS
Status: DISCONTINUED | OUTPATIENT
Start: 2025-04-15 | End: 2025-04-16

## 2025-04-15 RX ADMIN — ONDANSETRON 4 MG: 2 INJECTION INTRAMUSCULAR; INTRAVENOUS at 04:50

## 2025-04-15 RX ADMIN — ACETAMINOPHEN 650 MG: 325 TABLET, FILM COATED ORAL at 04:50

## 2025-04-15 RX ADMIN — CEFTRIAXONE 1000 MG: 1 INJECTION, SOLUTION INTRAVENOUS at 08:57

## 2025-04-15 RX ADMIN — POTASSIUM CHLORIDE 20 MEQ: 14.9 INJECTION, SOLUTION INTRAVENOUS at 08:57

## 2025-04-15 RX ADMIN — AMITRIPTYLINE HYDROCHLORIDE 75 MG: 25 TABLET, FILM COATED ORAL at 22:39

## 2025-04-15 RX ADMIN — POTASSIUM CHLORIDE AND SODIUM CHLORIDE 100 ML/HR: 900; 300 INJECTION, SOLUTION INTRAVENOUS at 08:54

## 2025-04-15 RX ADMIN — POTASSIUM CHLORIDE 20 MEQ: 14.9 INJECTION, SOLUTION INTRAVENOUS at 11:58

## 2025-04-15 RX ADMIN — POTASSIUM CHLORIDE 40 MEQ: 1500 TABLET, EXTENDED RELEASE ORAL at 08:56

## 2025-04-15 RX ADMIN — ESCITALOPRAM OXALATE 20 MG: 20 TABLET ORAL at 22:39

## 2025-04-15 RX ADMIN — POTASSIUM CHLORIDE 20 MEQ: 14.9 INJECTION, SOLUTION INTRAVENOUS at 13:49

## 2025-04-15 NOTE — ASSESSMENT & PLAN NOTE
Lab Results   Component Value Date    EGFR 57 04/15/2025    EGFR 51 04/14/2025    EGFR 47 04/14/2025    CREATININE 1.18 04/15/2025    CREATININE 1.30 04/14/2025    CREATININE 1.38 (H) 04/14/2025   Baseline creatinine around 1.0 mg/dL.

## 2025-04-15 NOTE — ASSESSMENT & PLAN NOTE
Generalized weakness for past 2-3 days.   Patient was on Augmentin and medrol dose pack for laryngitis and didn't tolerate PO diet at home for last few days. Multiple nausea and vomiting episodes. Notices dysuria on 04/13  Fatigue likely 2/2 recent dehydration and URI or UTI  In ED, found to have electrolyte displacement, potassium 2.5 and sodium 131  04/15: hyponatremia resolved, still hypokalemic 2.7    - patient has slight improvement with fatigue on 04/14  - replete potassium as needed  - continue on IV fluids for hydration  - see hypokalemia A/P  - continue regular diet  - IV zofran prn for nausea

## 2025-04-15 NOTE — ASSESSMENT & PLAN NOTE
Pre-renal TICO on urine studies  Cr elevated 2.16, baseline around 1 to 1.1  Trending down 1.38 > 1.18 resolved  - continue IV fluids  - avoid nephrotoxic agents  - nephro consulted and recs appreciated

## 2025-04-15 NOTE — ASSESSMENT & PLAN NOTE
At home receiving cyanocobalamin IM every month  Baseline hemoglobin ~14 with treatment  Hgb trending down 9.5 > 8.8  Likely due to IV fluids  - check iron panel  - continue monitor am lab, H/H

## 2025-04-15 NOTE — UTILIZATION REVIEW
Continued Stay Review    Date: 4/15                          Current Patient Class: Inpatient  Current Level of Care: ms    HPI:40 y.o. female initially admitted on 4/13   Current Diagnosis: fatigue N/V hypokalemia     Assessment/Plan:     4/15 IM Note   still feels weak but slightly better compared to yesterday. Tolerated food but required zofran prior eating. Continues to have recurrent episodes of hypokalemia despite aggressive repletion. K 2.7 Replete w/ po K , transition to IV kcl . TICO resolved . F/u ua cx . Cont ceftriaxone . CR improved to 1.18 from 1.38 cont IVF .   Hgb trending down to 8.8 from 9.5 likely d/t IVF , check Fe panel and monitor H&H . Anticipate discharge in 24-48 hrs to home.     Medications:   Scheduled Medications:  amitriptyline, 75 mg, Oral, HS  cefTRIAXone, 1,000 mg, Intravenous, Q24H  escitalopram, 20 mg, Oral, HS  heparin (porcine), 5,000 Units, Subcutaneous, Q8H MIKE  potassium chloride, 40 mEq, Oral, Q6H  potassium chloride, 20 mEq, Intravenous, Q2H      Continuous IV Infusions:  sodium chloride 0.9 % with KCl 40 mEq/L, 100 mL/hr, Intravenous, Continuous      PRN Meds:  acetaminophen, 650 mg, Oral, Q6H PRN  meclizine, 12.5 mg, Oral, TID PRN  ondansetron, 4 mg, Intravenous, Q4H PRN      Discharge Plan: TBD     Vital Signs (last 3 days)       Date/Time Temp Pulse Resp BP MAP (mmHg) SpO2 O2 Device Patient Position - Orthostatic VS [place] Coma Scale Score Pain    04/15/25 07:19:31 97.4 °F (36.3 °C) 75 19 118/67 84 99 % None (Room air) Lying -- --    04/15/25 0450 -- -- -- -- -- -- -- -- -- 5    04/15/25 0440 -- 67 19 103/64 77 -- -- Lying -- --    04/15/25 04:33:02 97.2 °F (36.2 °C) 69 -- 103/64 77 99 % -- -- -- --    04/15/25 04:28:54 97.2 °F (36.2 °C) 69 19 94/43 60 100 % -- Lying -- --    04/15/25 0100 -- -- -- -- -- 99 % None (Room air) -- 15 No Pain    04/15/25 00:06:58 -- 74 20 107/53 71 -- -- Lying -- --    04/14/25 20:14:50 98.6 °F (37 °C) 81 -- 104/60 75 98 % -- -- -- No Pain     04/14/25 14:18:14 98.8 °F (37.1 °C) 85 20 104/55 71 99 % -- -- -- --    04/14/25 0900 -- -- -- -- -- -- -- -- -- No Pain    04/14/25 07:24:51 97.3 °F (36.3 °C) 74 19 106/63 77 99 % -- -- -- --    04/14/25 0100 -- -- -- -- -- 99 % None (Room air) -- 15 No Pain    04/13/25 21:52:11 98.8 °F (37.1 °C) 76 19 105/55 72 98 % None (Room air) Sitting -- --    04/13/25 18:35:13 98.5 °F (36.9 °C) 91 16 123/79 94 97 % -- -- -- --    04/13/25 1458 97.7 °F (36.5 °C) -- -- -- -- -- None (Room air) -- 15 No Pain    04/13/25 1300 97.7 °F (36.5 °C) 81 16 145/82 103 98 % None (Room air) Lying -- --    04/13/25 12:58:18 97.7 °F (36.5 °C) 81 -- 145/82 103 97 % -- -- -- --    04/13/25 1258 -- -- -- -- -- -- -- -- -- No Pain    04/13/25 1200 -- 84 16 129/64 91 100 % None (Room air) Lying -- 3    04/13/25 1000 -- 83 18 136/62 89 99 % None (Room air) Lying -- --    04/13/25 0930 -- 85 20 129/62 89 100 % None (Room air) Lying -- --    04/13/25 0911 -- -- -- -- -- -- None (Room air) -- 15 6    04/13/25 0849 97.1 °F (36.2 °C) 105 20 126/66 -- 100 % None (Room air) Lying -- 6          Weight (last 2 days)       Date/Time Weight    04/13/25 1300 57.3 (126.32)    04/13/25 0849 56.9 (125.44)            Pertinent Labs/Diagnostic Results:   Radiology:  CT abdomen pelvis wo contrast   Final Interpretation by Marty Florentino MD (04/13 1401)      Diffuse fluid distention of the large bowel. Diagnostic considerations include infectious/inflammatory diarrheal state, malabsorption.      Nephrolithiasis.      Workstation performed: [id number]           Cardiology:  ECG 12 lead   Final Result by Madeline Parisi DO (04/15 0821)   Normal sinus rhythm   Right atrial enlargement   Nonspecific ST and T wave abnormality   Prolonged QT   Abnormal ECG   When compared with ECG of 09-Jan-2025 09:02,   Nonspecific T wave abnormality no longer evident in Inferior leads   Nonspecific T wave abnormality no longer evident in Anterior leads   QT has lengthened   Confirmed by  Madeline Parisi ([id number]) on 4/15/2025 8:21:07 AM        GI:  No orders to display       Results from last 7 days   Lab Units 04/13/25  1457   SARS-COV-2  Not Detected     Results from last 7 days   Lab Units 04/15/25  0446 04/14/25  0630 04/13/25 1418 04/13/25  0858   WBC Thousand/uL 5.95 6.36  --  13.74*   HEMOGLOBIN g/dL 8.8* 9.5*  --  14.0   HEMATOCRIT % 28.2* 30.1*  --  43.2   PLATELETS Thousands/uL 258 276 361 449*   TOTAL NEUT ABS Thousands/µL 3.65  --   --  11.32*         Results from last 7 days   Lab Units 04/15/25  0446 04/14/25  1416 04/14/25  0456 04/13/25  1418 04/13/25  0858   SODIUM mmol/L 138 133* 138 135 131*   POTASSIUM mmol/L 2.7* 3.2* 2.5* 2.9* 2.5*   CHLORIDE mmol/L 108 106 107 104 96   CO2 mmol/L 23 21 23 20* 19*   ANION GAP mmol/L 7 6 8 11 16*   BUN mg/dL 12 17 23 35* 39*   CREATININE mg/dL 1.18 1.30 1.38* 1.84* 2.16*   EGFR ml/min/1.73sq m 57 51 47 33 27   CALCIUM mg/dL 7.4* 7.3* 7.5* 7.8* 9.1   MAGNESIUM mg/dL 2.0  --  2.2  --  2.7   PHOSPHORUS mg/dL 2.2*  --  3.1  --  5.1*     Results from last 7 days   Lab Units 04/14/25 0456 04/13/25  0858   AST U/L 14 18   ALT U/L 11 17   ALK PHOS U/L 42 61   TOTAL PROTEIN g/dL 5.9* 8.6*   ALBUMIN g/dL 3.5 4.9   TOTAL BILIRUBIN mg/dL 0.37 0.44     Results from last 7 days   Lab Units 04/13/25  0907   POC GLUCOSE mg/dl 146*     Results from last 7 days   Lab Units 04/15/25  0446 04/14/25  1416 04/14/25  0456 04/13/25 1418 04/13/25  0858 04/10/25  0843   GLUCOSE RANDOM mg/dL 76 89 89 100 157* 102       Beta- Hydroxybutyrate   Date Value Ref Range Status   04/13/2025 0.28 (H) 0.02 - 0.27 mmol/L Final   01/09/2025 0.20 0.02 - 0.27 mmol/L Final     BETA-HYDROXYBUTYRATE   Date Value Ref Range Status   09/02/2022 0.4 <0.6 mmol/L Final          Results from last 7 days   Lab Units 04/13/25  1509   PH ALYCE  7.284*   PCO2 ALYCE mm Hg 40.2*   PO2 ALYCE mm Hg 39.4   HCO3 ALYCE mmol/L 18.6*   BASE EXC ALYCE mmol/L -7.5   O2 CONTENT ALYCE ml/dL 11.8   O2 HGB, VENOUS % 65.5      Results from last 7 days   Lab Units 04/13/25  0858   TSH 3RD GENERATON uIU/mL 5.153*       Results from last 7 days   Lab Units 04/13/25  1418   LACTIC ACID mmol/L 0.8         Results from last 7 days   Lab Units 04/13/25  0858   LIPASE u/L 40       Results from last 7 days   Lab Units 04/13/25  1418   CLARITY UA  Turbid   COLOR UA  Yellow   SPEC GRAV UA  1.025   PH UA  6.5   GLUCOSE UA mg/dl Negative   KETONES UA mg/dl Trace*   BLOOD UA  Trace*   PROTEIN UA mg/dl 30 (1+)*   NITRITE UA  Negative   BILIRUBIN UA  Negative   UROBILINOGEN UA (BE) mg/dl <2.0   LEUKOCYTES UA  Small*   WBC UA /hpf 4-10*   RBC UA /hpf 2-4   BACTERIA UA /hpf Innumerable*   EPITHELIAL CELLS WET PREP /hpf Moderate*   SODIUM UR mmol/L <10.0   CREATININE UR mg/dL 127.5     Results from last 7 days   Lab Units 04/13/25  1457   INFLUENZA B  Not Detected   RESPIRATORY SYNCYTIAL VIRUS  Not Detected     Results from last 7 days   Lab Units 04/13/25  1457   ADENOVIRUS  Not Detected   BORDETELLA PARAPERTUSSIS  Not Detected   BORDETELLA PERTUSSIS  Not Detected   CHLAMYDIA PNEUMONIAE  Not Detected   CORONAVIRUS 229E  Not Detected   CORONAVIRUS HKU1  Not Detected   CORONAVIRUS NL63  Not Detected   CORONAVIRUS OC43  Not Detected   METAPNEUMOVIRUS  Not Detected   RHINOVIRUS  Not Detected   MYCOPLASMA PNEUMONIAE  Not Detected   PARAINFLUENZA 1  Not Detected   PARAINFLUENZA 2  Not Detected   PARAINFLUENZA 3  Not Detected   PARAINFLUENZA 4  Not Detected     Network Utilization Review Department  ATTENTION: Please call with any questions or concerns to [phone] and carefully listen to the prompts so that you are directed to the right person. All voicemails are confidential.   For Discharge needs, contact Care Management DC Support Team at [phone] opt. 2  Send all requests for admission clinical reviews, approved or denied determinations and any other requests to dedicated fax number below belonging to the campus where the patient is receiving  treatment. List of dedicated fax numbers for the Facilities:  FACILITY NAME UR FAX NUMBER   ADMISSION DENIALS (Administrative/Medical Necessity) [phone]   DISCHARGE SUPPORT TEAM (NETWORK) [phone]   PARENT CHILD HEALTH (Maternity/NICU/Pediatrics) [phone]   [facility] [phone]   [facility] [phone]   [facility] [phone]   [facility] [phone]   [facility] [phone]   General acute hospital [phone]   [facility] [phone]   [facility] [phone]   [facility] [phone]   [facility] [phone]   [facility] [phone]   [facility] [phone]

## 2025-04-15 NOTE — ASSESSMENT & PLAN NOTE
History of gastric bypass in 2014, w/ L chest port for infusion  Recurrent electrolyte displacement for many years.   On daily potassium infusion, monthly vitamin B12 injection and weekly vitamin D 77247U oral supplements  - Avoid NSAIDs   - f/u GI outpatient

## 2025-04-15 NOTE — PROGRESS NOTES
Progress Note - Hospitalist   Name: Brittani Patino 40 y.o. female I MRN: [id number]  Unit/Bed#: 422-01 I Date of Admission: 4/13/2025   Date of Service: 4/15/2025 I Hospital Day: 2    Assessment & Plan  Weakness  Generalized weakness for past 2-3 days.   Patient was on Augmentin and medrol dose pack for laryngitis and didn't tolerate PO diet at home for last few days. Multiple nausea and vomiting episodes. Notices dysuria on 04/13  Fatigue likely 2/2 recent dehydration and URI or UTI  In ED, found to have electrolyte displacement, potassium 2.5 and sodium 131  04/15: hyponatremia resolved, still hypokalemic 2.7    - patient has slight improvement with fatigue on 04/14  - replete potassium as needed  - continue on IV fluids for hydration  - see hypokalemia A/P  - continue regular diet  - IV zofran prn for nausea  Hypokalemia  Presented with potassium 2.5 (baseline ~4.0)  04/13: Replete with total IV potassium 60mEq, and on IV fluids w/ potassium 20mEq; potassium sill remain same  04/14: received 160mEq of potassium  04/15: potassium still < 3.5    - on telemetry  - replete potassium with scheduled PO potassium 40mEq q6hr and IV potassium 20mEq x 3 doses  - hold home med aldactone 12.5mg daily  - check morning am lab  TICO (acute kidney injury) (HCC) (Resolved: 4/15/2025)  Pre-renal TIOC on urine studies  Cr elevated 2.16, baseline around 1 to 1.1  Trending down 1.38 > 1.18 resolved  - continue IV fluids  - avoid nephrotoxic agents  - nephro consulted and recs appreciated  Hx of gastric bypass  History of gastric bypass in 2014, w/ L chest port for infusion  Recurrent electrolyte displacement for many years.   On daily potassium infusion, monthly vitamin B12 injection and weekly vitamin D 59469F oral supplements  - Avoid NSAIDs   - f/u GI outpatient   Stage 3 chronic kidney disease (HCC)  Lab Results   Component Value Date    EGFR 57 04/15/2025    EGFR 51 04/14/2025    EGFR 47 04/14/2025    CREATININE 1.18 04/15/2025     CREATININE 1.30 04/14/2025    CREATININE 1.38 (H) 04/14/2025   See TICO A/P    Migraine  Continue home med amitriptyline 75mg daily  Hx of anxiety disorder  Continue home med lexapro 20mg daily  UTI (urinary tract infection)  Dysuria on 04/13  UA shows UTI  Diflucan given once  start IV Rocephin on 04/14    - continue IV Rocephin  - monitor symptoms and am labs  Acute on chronic anemia  At home receiving cyanocobalamin IM every month  Baseline hemoglobin ~14 with treatment  Hgb trending down 9.5 > 8.8  Likely due to IV fluids  - check iron panel  - continue monitor am lab, H/H    VTE Pharmacologic Prophylaxis: VTE Score: 3 Moderate Risk (Score 3-4) - Pharmacological DVT Prophylaxis Ordered: heparin.    Mobility:   Basic Mobility Inpatient Raw Score: 24  JH-HLM Goal: 8: Walk 250 feet or more  JH-HLM Achieved: 8: Walk 250 feet ot more  JH-HLM Goal achieved. Continue to encourage appropriate mobility.    Patient Centered Rounds: I performed bedside rounds with nursing staff today.   Discussions with Specialists or Other Care Team Provider: nephrology, case mangement    Education and Discussions with Family / Patient:  will call spouse .     Current Length of Stay: 2 day(s)  Current Patient Status: Inpatient   Certification Statement: The patient will continue to require additional inpatient hospital stay due to hypokalemia requiring electrolyte repletion  Discharge Plan: Anticipate discharge in 24-48 hrs to home.    Code Status: Level 1 - Full Code    Subjective   Patient examined bedside. She still feels weak but slightly better compared to yesterday. Tolerated food but required zofran prior eating. Denied fever, chills, abdominal pain. Less dysuria after starting antibiotics    Objective :  Temp:  [97.2 °F (36.2 °C)-98.8 °F (37.1 °C)] 97.4 °F (36.3 °C)  HR:  [67-85] 75  BP: ()/(43-67) 118/67  Resp:  [19-20] 19  SpO2:  [98 %-100 %] 99 %  O2 Device: None (Room air)    Body mass index is 23.1 kg/m².     Input and  Output Summary (last 24 hours):     Intake/Output Summary (Last 24 hours) at 4/15/2025 0923  Last data filed at 4/15/2025 0903  Gross per 24 hour   Intake 1260 ml   Output --   Net 1260 ml       Physical Exam  Vitals and nursing note reviewed.   Constitutional:       General: She is not in acute distress.     Appearance: Normal appearance. She is well-developed. She is not ill-appearing.   HENT:      Head: Normocephalic and atraumatic.      Right Ear: External ear normal.      Left Ear: External ear normal.      Nose: Nose normal. No congestion.      Mouth/Throat:      Mouth: Mucous membranes are moist.      Pharynx: Oropharynx is clear. No oropharyngeal exudate or posterior oropharyngeal erythema.   Eyes:      General:         Right eye: No discharge.         Left eye: No discharge.      Extraocular Movements: Extraocular movements intact.      Conjunctiva/sclera: Conjunctivae normal.   Cardiovascular:      Rate and Rhythm: Normal rate and regular rhythm.      Pulses: Normal pulses.      Heart sounds: Normal heart sounds. No murmur heard.  Pulmonary:      Effort: Pulmonary effort is normal. No respiratory distress.      Breath sounds: Normal breath sounds. No wheezing.   Abdominal:      General: Abdomen is flat. Bowel sounds are normal. There is no distension.      Palpations: Abdomen is soft.      Tenderness: There is no abdominal tenderness. There is no guarding or rebound.   Musculoskeletal:         General: No swelling. Normal range of motion.      Cervical back: Normal range of motion and neck supple.      Right lower leg: No edema.      Left lower leg: No edema.   Skin:     General: Skin is warm and dry.      Capillary Refill: Capillary refill takes less than 2 seconds.      Findings: No rash.   Neurological:      General: No focal deficit present.      Mental Status: She is alert and oriented to person, place, and time.   Psychiatric:         Mood and Affect: Mood normal.            Lines/Drains:  Lines/Drains/Airways       Active Status       Name Placement date Placement time Site Days    Port A Cath 02/09/24 Left Chest 02/09/24  1528  Chest  430                    Central Line:  Goal for removal:  N/A, chronic port for infusion         Telemetry:  Telemetry Orders (From admission, onward)               24 Hour Telemetry Monitoring  Continuous x 24 Hours (Telem)        Expiring   Question:  Reason for 24 Hour Telemetry  Answer:  Metabolic/electrolyte disturbance with high probability of dysrhythmia. K level <3 or >6 OR KCL infusion >10mEq/hr                     Telemetry Reviewed: Normal Sinus Rhythm  Indication for Continued Telemetry Use: Arrthymias requiring medical therapy               Lab Results: I have reviewed the following results:   Results from last 7 days   Lab Units 04/15/25  0446   WBC Thousand/uL 5.95   HEMOGLOBIN g/dL 8.8*   HEMATOCRIT % 28.2*   PLATELETS Thousands/uL 258   SEGS PCT % 61   LYMPHO PCT % 28   MONO PCT % 9   EOS PCT % 1     Results from last 7 days   Lab Units 04/15/25  0446 04/14/25  1416 04/14/25  0456   SODIUM mmol/L 138   < > 138   POTASSIUM mmol/L 2.7*   < > 2.5*   CHLORIDE mmol/L 108   < > 107   CO2 mmol/L 23   < > 23   BUN mg/dL 12   < > 23   CREATININE mg/dL 1.18   < > 1.38*   ANION GAP mmol/L 7   < > 8   CALCIUM mg/dL 7.4*   < > 7.5*   ALBUMIN g/dL  --   --  3.5   TOTAL BILIRUBIN mg/dL  --   --  0.37   ALK PHOS U/L  --   --  42   ALT U/L  --   --  11   AST U/L  --   --  14   GLUCOSE RANDOM mg/dL 76   < > 89    < > = values in this interval not displayed.         Results from last 7 days   Lab Units 04/13/25  0907   POC GLUCOSE mg/dl 146*         Results from last 7 days   Lab Units 04/13/25  1418   LACTIC ACID mmol/L 0.8       Recent Cultures (last 7 days):         Imaging Results Review: No pertinent imaging studies reviewed.  Other Study Results Review: No additional pertinent studies reviewed.    Last 24 Hours Medication List:     Current  Facility-Administered Medications:     acetaminophen (TYLENOL) tablet 650 mg, Q6H PRN    amitriptyline (ELAVIL) tablet 75 mg, HS    cefTRIAXone (ROCEPHIN) IVPB (premix in dextrose) 1,000 mg 50 mL, Q24H, Last Rate: 1,000 mg (04/15/25 0857)    escitalopram (LEXAPRO) tablet 20 mg, HS    heparin (porcine) subcutaneous injection 5,000 Units, Q8H MIKE    meclizine (ANTIVERT) tablet 12.5 mg, TID PRN    ondansetron (ZOFRAN) injection 4 mg, Q4H PRN    potassium chloride (Klor-Con M20) CR tablet 40 mEq, Q6H    potassium chloride 20 mEq IVPB (premix), Q2H, Last Rate: 20 mEq (04/15/25 0857)    sodium chloride 0.9 % with KCl 40 mEq/L infusion (premix), Continuous, Last Rate: 100 mL/hr (04/15/25 0854)    Administrative Statements   Today, Patient Was Seen By: Mallory Gonsalez, DO  I have spent a total time of 20 minutes in caring for this patient on the day of the visit/encounter including Impressions, Counseling / Coordination of care, Documenting in the medical record, Reviewing/placing orders in the medical record (including tests, medications, and/or procedures), Obtaining or reviewing history  , and Communicating with other healthcare professionals .    **Please Note: This note may have been constructed using a voice recognition system.**

## 2025-04-15 NOTE — UTILIZATION REVIEW
NOTIFICATION OF INPATIENT ADMISSION   AUTHORIZATION REQUEST   SERVICING FACILITY:   [place] [address]  Tax ID:  [id number]  NPI: [id number] ATTENDING PROVIDER:  Attending Name and NPI#: Khushi Garcia Md [[id number]]  Address: [address] [place] [address]  Phone: [phone]     ADMISSION INFORMATION:  Place of Service: Inpatient [facility]  Place of Service Code: 21  Inpatient Admission Date/Time: 4/13/25 12:11 PM  Discharge Date/Time: No discharge date for patient encounter.  Admitting Diagnosis Code/Description:  Hypokalemia [E87.6]  Weakness [R53.1]  TICO (acute kidney injury) (HCC) [N17.9]  High anion gap metabolic acidosis [E87.29]     UTILIZATION REVIEW CONTACT:  Saima Samson Utilization   Network Utilization Review Department  Phone: [phone]  Fax: [phone]  Email: [email or web address]  Contact for approvals/pending authorizations, clinical reviews, and discharge.  .     PHYSICIAN ADVISORY SERVICES:  Medical Necessity Denial & [facility] Review  Phone: [phone]  Fax: [phone]  Email: [email or web address]     DISCHARGE SUPPORT TEAM:  For Patients Discharge Needs & Updates  Phone: [phone] opt. [phone] Fax: [phone]  Email: [email or web address]

## 2025-04-15 NOTE — PROGRESS NOTES
Progress Note - Nephrology   Name: Brittani Patino 40 y.o. female I MRN: [id number]  Unit/Bed#: 422-01 I Date of Admission: 4/13/2025   Date of Service: 4/15/2025 I Hospital Day: 2    Assessment & Plan  Weakness    Hypokalemia  Patient has had instances of hypokalemia in the past that has led to hospitalization.  She is followed by IV nephrologists and has had full workup in the past and requires daily infusions of potassium due to poor oral absorption.  The main etiology of the patient's hypokalemia in the chronic setting is lack of GI absorption along with increased losses via the GI tract/chronic diarrhea from gastric bypass.  She has had minimal urine losses of potassium.  During the past 3 days, she started to feel ill after developing a sore throat and hoarse voice for which she was seen in the ER and diagnosed with acute laryngeal pharyngitis.  Prior to this, her potassiums have been normal but recently they have been trending down and this acute illness may be the reason why.  On top of having nausea, vomiting and poor p.o. intake this likely threw off her potassium balance.  Currently her potassium level is at 2.5.  Etiology of the acute hypokalemia is most likely due to GI losses from diarrhea along with vomiting which can cause low potassium from other mechanisms.  Current plan is to monitor patient on telemetry given her symptoms of numbness/tingling in her face and chest pressure and provide IV potassium chloride infusions.      April 14: Potassium 2.5 mmol/L at 04:45 AM with potassium chloride 20 mEq x 1 IV and 40 mEq twice daily PO ordered. Repeat K 3.2 mmol/L at 14:16 pm. Continue oral Kchlor 40 mEq twice daily. Recheck BMP in AM.     April 15: Potassium 2.7 mmol/L 4/15, decreased from 3.2 mmol/L at 2 PM on 4/14.  Currently ordered potassium 40 mEq x 4 p.o., 20 mEq x 3 IV in addition to NS with 40mEq/L at 100 ml/hr for a total of 156 mEq of potassium over 24 hours.  Outpatient patient receives 80 mEq of  potassium daily IV at home over 8 hours due to poor oral absorption. Will discontinue oral potassium with history of inadequate oral absorption and chronic home IV potassium.  Will recheck BMP at 6 PM.    Hx of gastric bypass    Hx of anxiety disorder    Stage 3 chronic kidney disease (HCC)  Lab Results   Component Value Date    EGFR 57 04/15/2025    EGFR 51 04/14/2025    EGFR 47 04/14/2025    CREATININE 1.18 04/15/2025    CREATININE 1.30 04/14/2025    CREATININE 1.38 (H) 04/14/2025   Baseline creatinine around 1.0 mg/dL.   Migraine    UTI (urinary tract infection)  UA as noted above. Currently receiving ceftriaxone. Continue management per hospitalist.   Acute on chronic anemia      I have reviewed the nephrology recommendations including potassium trends with hospitalist and we are in agreement with renal plan including the information outlined above.     Subjective   Brief History of Admission -  Brittani Patino is a 40 y.o. year old female nephrolithiasis, CKD 3A, gastric bypass, resistant hypokalemia who was admitted to [facility] after presenting with generalized weakness and malaise.  Her symptoms started about 3 to 4 days ago when she started to feel weak and worn out and did not eat anything during this timeframe due to nausea and inability to keep food down.  She reports having a fever the last 2 days and chills.  She was in the urgent care on April 10 and diagnosed with laryngitis and prescribed steroids.  She is still complaining of nausea and poor appetite.  She has been receiving her regularly scheduled overnight potassium infusions.  Nephrology is following for assistance in the management of TICO and hypokalemia.     Patient is examined resting in bed. States nausea and diarrhea are both improving.    Objective :  Temp:  [97.2 °F (36.2 °C)-98.8 °F (37.1 °C)] 97.4 °F (36.3 °C)  HR:  [67-85] 75  BP: ()/(43-67) 118/67  Resp:  [19-20] 19  SpO2:  [98 %-100 %] 99 %  O2 Device: None (Room air)    Current  Weight: Weight - Scale: 57.3 kg (126 lb 5.2 oz)  First Weight: Weight - Scale: 56.9 kg (125 lb 7.1 oz)  I/O         04/13 0701  04/14 0700 04/14 0701  04/15 0700 04/15 0701  04/16 0700    P.O. 1200 1200 420    I.V. (mL/kg) 1000 (17.5)      IV Piggyback 1300      Total Intake(mL/kg) 3500 (61.1) 1200 (20.9) 420 (7.3)    Net +3500 +1200 +420           Unmeasured Urine Occurrence 3 x 3 x 1 x    Unmeasured Stool Occurrence 1 x            Physical Exam  Vitals and nursing note reviewed.   Constitutional:       General: She is not in acute distress.     Appearance: Normal appearance. She is well-developed and normal weight. She is ill-appearing.   HENT:      Head: Normocephalic and atraumatic.      Right Ear: External ear normal.      Left Ear: External ear normal.      Nose: Nose normal.      Mouth/Throat:      Mouth: Mucous membranes are moist.      Pharynx: Oropharynx is clear.   Eyes:      Conjunctiva/sclera: Conjunctivae normal.      Pupils: Pupils are equal, round, and reactive to light.   Cardiovascular:      Rate and Rhythm: Normal rate and regular rhythm.      Heart sounds: Normal heart sounds. No murmur heard.  Pulmonary:      Effort: Pulmonary effort is normal. No respiratory distress.      Breath sounds: Normal breath sounds.   Abdominal:      Palpations: Abdomen is soft.      Tenderness: There is no abdominal tenderness.   Musculoskeletal:         General: No swelling.      Cervical back: Neck supple.      Right lower leg: No edema.      Left lower leg: No edema.   Skin:     General: Skin is warm and dry.      Capillary Refill: Capillary refill takes less than 2 seconds.   Neurological:      General: No focal deficit present.      Mental Status: She is alert and oriented to person, place, and time.   Psychiatric:         Mood and Affect: Mood normal.         Behavior: Behavior normal.       Medications:    Current Facility-Administered Medications:     acetaminophen (TYLENOL) tablet 650 mg, 650 mg, Oral, Q6H  PRN, Mallory Gonsalez DO, 650 mg at 04/15/25 0450    amitriptyline (ELAVIL) tablet 75 mg, 75 mg, Oral, HS, Mallory Gonsalez DO, 75 mg at 04/14/25 2139    cefTRIAXone (ROCEPHIN) IVPB (premix in dextrose) 1,000 mg 50 mL, 1,000 mg, Intravenous, Q24H, Khushi Garcia MD, Last Rate: 100 mL/hr at 04/15/25 0857, 1,000 mg at 04/15/25 0857    escitalopram (LEXAPRO) tablet 20 mg, 20 mg, Oral, HS, Mallory Gonsalez DO, 20 mg at 04/14/25 2139    heparin (porcine) subcutaneous injection 5,000 Units, 5,000 Units, Subcutaneous, Q8H MIKE, Khushi Garcia MD, 5,000 Units at 04/13/25 1415    meclizine (ANTIVERT) tablet 12.5 mg, 12.5 mg, Oral, TID PRN, Mallory Gonsalez DO    ondansetron (ZOFRAN) injection 4 mg, 4 mg, Intravenous, Q4H PRN, Khushi Garcia MD, 4 mg at 04/15/25 0450    potassium chloride (Klor-Con M20) CR tablet 40 mEq, 40 mEq, Oral, Q6H, Khushi Garcia MD, 40 mEq at 04/15/25 0856    potassium chloride 20 mEq IVPB (premix), 20 mEq, Intravenous, Q2H, Khushi Garcia MD, Last Rate: 50 mL/hr at 04/15/25 0857, 20 mEq at 04/15/25 0857    sodium chloride 0.9 % with KCl 40 mEq/L infusion (premix), 100 mL/hr, Intravenous, Continuous, Khushi Garcia MD, Last Rate: 100 mL/hr at 04/15/25 0854, 100 mL/hr at 04/15/25 0854      Lab Results: I have reviewed the following results:  Results from last 7 days   Lab Units 04/15/25  0446 04/14/25  1416 04/14/25  0630 04/14/25  0456 04/13/25  1418 04/13/25  0858 04/10/25  0843   WBC Thousand/uL 5.95  --  6.36  --   --  13.74*  --    HEMOGLOBIN g/dL 8.8*  --  9.5*  --   --  14.0  --    HEMATOCRIT % 28.2*  --  30.1*  --   --  43.2  --    PLATELETS Thousands/uL 258  --  276  --  361 449*  --    POTASSIUM mmol/L 2.7* 3.2*  --  2.5* 2.9* 2.5* 3.2*   CHLORIDE mmol/L 108 106  --  107 104 96 101   CO2 mmol/L 23 21  --  23 20* 19* 22   BUN mg/dL 12 17  --  23 35* 39* 16   CREATININE mg/dL 1.18 1.30  --  1.38* 1.84* 2.16* 1.24   CALCIUM mg/dL 7.4* 7.3*  --  7.5* 7.8* 9.1 9.1   MAGNESIUM mg/dL 2.0  --   --  2.2  --  2.7  --   "  PHOSPHORUS mg/dL 2.2*  --   --  3.1  --  5.1*  --    ALBUMIN g/dL  --   --   --  3.5  --  4.9  --        Administrative Statements     Portions of the record may have been created with voice recognition software. Occasional wrong word or \"sound a like\" substitutions may have occurred due to the inherent limitations of voice recognition software. Read the chart carefully and recognize, using context, where substitutions have occurred.If you have any questions, please contact the dictating provider.  "

## 2025-04-15 NOTE — ASSESSMENT & PLAN NOTE
Presented with potassium 2.5 (baseline ~4.0)  04/13: Replete with total IV potassium 60mEq, and on IV fluids w/ potassium 20mEq; potassium sill remain same  04/14: received 160mEq of potassium  04/15: potassium still < 3.5    - on telemetry  - replete potassium with scheduled PO potassium 40mEq q6hr and IV potassium 20mEq x 3 doses  - hold home med aldactone 12.5mg daily  - check morning am lab

## 2025-04-15 NOTE — ASSESSMENT & PLAN NOTE
Patient has had instances of hypokalemia in the past that has led to hospitalization.  She is followed by IV nephrologists and has had full workup in the past and requires daily infusions of potassium due to poor oral absorption.  The main etiology of the patient's hypokalemia in the chronic setting is lack of GI absorption along with increased losses via the GI tract/chronic diarrhea from gastric bypass.  She has had minimal urine losses of potassium.  During the past 3 days, she started to feel ill after developing a sore throat and hoarse voice for which she was seen in the ER and diagnosed with acute laryngeal pharyngitis.  Prior to this, her potassiums have been normal but recently they have been trending down and this acute illness may be the reason why.  On top of having nausea, vomiting and poor p.o. intake this likely threw off her potassium balance.  Currently her potassium level is at 2.5.  Etiology of the acute hypokalemia is most likely due to GI losses from diarrhea along with vomiting which can cause low potassium from other mechanisms.  Current plan is to monitor patient on telemetry given her symptoms of numbness/tingling in her face and chest pressure and provide IV potassium chloride infusions.      April 14: Potassium 2.5 mmol/L at 04:45 AM with potassium chloride 20 mEq x 1 IV and 40 mEq twice daily PO ordered. Repeat K 3.2 mmol/L at 14:16 pm. Continue oral Kchlor 40 mEq twice daily. Recheck BMP in AM.     April 15: Potassium 2.7 mmol/L 4/15, decreased from 3.2 mmol/L at 2 PM on 4/14.  Currently ordered potassium 40 mEq x 4 p.o., 20 mEq x 3 IV in addition to NS with 40mEq/L at 100 ml/hr for a total of 156 mEq of potassium over 24 hours.  Outpatient patient receives 80 mEq of potassium daily IV at home over 8 hours due to poor oral absorption. Will discontinue oral potassium with history of inadequate oral absorption and chronic home IV potassium.  Will recheck BMP at 6 PM.

## 2025-04-15 NOTE — ASSESSMENT & PLAN NOTE
Lab Results   Component Value Date    EGFR 57 04/15/2025    EGFR 51 04/14/2025    EGFR 47 04/14/2025    CREATININE 1.18 04/15/2025    CREATININE 1.30 04/14/2025    CREATININE 1.38 (H) 04/14/2025   See TICO A/P

## 2025-04-15 NOTE — ASSESSMENT & PLAN NOTE
Dysuria on 04/13  UA shows UTI  Diflucan given once  start IV Rocephin on 04/14    - continue IV Rocephin  - monitor symptoms and am labs

## 2025-04-16 LAB
ANION GAP SERPL CALCULATED.3IONS-SCNC: 6 MMOL/L (ref 4–13)
ANION GAP SERPL CALCULATED.3IONS-SCNC: 7 MMOL/L (ref 4–13)
BASOPHILS # BLD AUTO: 0.03 THOUSANDS/ÂΜL (ref 0–0.1)
BASOPHILS NFR BLD AUTO: 1 % (ref 0–1)
BUN SERPL-MCNC: 11 MG/DL (ref 5–25)
BUN SERPL-MCNC: 9 MG/DL (ref 5–25)
CALCIUM SERPL-MCNC: 7.4 MG/DL (ref 8.4–10.2)
CALCIUM SERPL-MCNC: 7.4 MG/DL (ref 8.4–10.2)
CHLORIDE SERPL-SCNC: 107 MMOL/L (ref 96–108)
CHLORIDE SERPL-SCNC: 108 MMOL/L (ref 96–108)
CO2 SERPL-SCNC: 22 MMOL/L (ref 21–32)
CO2 SERPL-SCNC: 23 MMOL/L (ref 21–32)
CREAT SERPL-MCNC: 1 MG/DL (ref 0.6–1.3)
CREAT SERPL-MCNC: 1.1 MG/DL (ref 0.6–1.3)
EOSINOPHIL # BLD AUTO: 0.04 THOUSAND/ÂΜL (ref 0–0.61)
EOSINOPHIL NFR BLD AUTO: 1 % (ref 0–6)
ERYTHROCYTE [DISTWIDTH] IN BLOOD BY AUTOMATED COUNT: 15.9 % (ref 11.6–15.1)
FOLATE SERPL-MCNC: 18.9 NG/ML
GFR SERPL CREATININE-BSD FRML MDRD: 62 ML/MIN/1.73SQ M
GFR SERPL CREATININE-BSD FRML MDRD: 70 ML/MIN/1.73SQ M
GLUCOSE SERPL-MCNC: 101 MG/DL (ref 65–140)
GLUCOSE SERPL-MCNC: 82 MG/DL (ref 65–140)
HCT VFR BLD AUTO: 29 % (ref 34.8–46.1)
HGB BLD-MCNC: 9 G/DL (ref 11.5–15.4)
IMM GRANULOCYTES # BLD AUTO: 0.02 THOUSAND/UL (ref 0–0.2)
IMM GRANULOCYTES NFR BLD AUTO: 0 % (ref 0–2)
LYMPHOCYTES # BLD AUTO: 1.49 THOUSANDS/ÂΜL (ref 0.6–4.47)
LYMPHOCYTES NFR BLD AUTO: 24 % (ref 14–44)
MCH RBC QN AUTO: 25.1 PG (ref 26.8–34.3)
MCHC RBC AUTO-ENTMCNC: 31 G/DL (ref 31.4–37.4)
MCV RBC AUTO: 81 FL (ref 82–98)
MONOCYTES # BLD AUTO: 0.38 THOUSAND/ÂΜL (ref 0.17–1.22)
MONOCYTES NFR BLD AUTO: 6 % (ref 4–12)
NEUTROPHILS # BLD AUTO: 4.26 THOUSANDS/ÂΜL (ref 1.85–7.62)
NEUTS SEG NFR BLD AUTO: 68 % (ref 43–75)
NRBC BLD AUTO-RTO: 0 /100 WBCS
PHOSPHATE SERPL-MCNC: 2.1 MG/DL (ref 2.7–4.5)
PLATELET # BLD AUTO: 245 THOUSANDS/UL (ref 149–390)
PMV BLD AUTO: 10.1 FL (ref 8.9–12.7)
POTASSIUM SERPL-SCNC: 3.1 MMOL/L (ref 3.5–5.3)
POTASSIUM SERPL-SCNC: 3.1 MMOL/L (ref 3.5–5.3)
RBC # BLD AUTO: 3.58 MILLION/UL (ref 3.81–5.12)
SODIUM SERPL-SCNC: 136 MMOL/L (ref 135–147)
SODIUM SERPL-SCNC: 137 MMOL/L (ref 135–147)
VIT B12 SERPL-MCNC: 80 PG/ML (ref 180–914)
WBC # BLD AUTO: 6.22 THOUSAND/UL (ref 4.31–10.16)

## 2025-04-16 PROCEDURE — 80048 BASIC METABOLIC PNL TOTAL CA: CPT | Performed by: INTERNAL MEDICINE

## 2025-04-16 PROCEDURE — 80048 BASIC METABOLIC PNL TOTAL CA: CPT | Performed by: FAMILY MEDICINE

## 2025-04-16 PROCEDURE — 84100 ASSAY OF PHOSPHORUS: CPT

## 2025-04-16 PROCEDURE — 99232 SBSQ HOSP IP/OBS MODERATE 35: CPT | Performed by: INTERNAL MEDICINE

## 2025-04-16 PROCEDURE — 99232 SBSQ HOSP IP/OBS MODERATE 35: CPT | Performed by: FAMILY MEDICINE

## 2025-04-16 PROCEDURE — 82746 ASSAY OF FOLIC ACID SERUM: CPT | Performed by: FAMILY MEDICINE

## 2025-04-16 PROCEDURE — 85025 COMPLETE CBC W/AUTO DIFF WBC: CPT | Performed by: FAMILY MEDICINE

## 2025-04-16 PROCEDURE — 82607 VITAMIN B-12: CPT | Performed by: FAMILY MEDICINE

## 2025-04-16 RX ORDER — POTASSIUM CHLORIDE 14.9 MG/ML
20 INJECTION INTRAVENOUS
Status: DISCONTINUED | OUTPATIENT
Start: 2025-04-16 | End: 2025-04-16

## 2025-04-16 RX ADMIN — SODIUM PHOSPHATE, MONOBASIC, MONOHYDRATE AND SODIUM PHOSPHATE, DIBASIC, ANHYDROUS 21 MMOL: 142; 276 INJECTION, SOLUTION INTRAVENOUS at 09:43

## 2025-04-16 RX ADMIN — POTASSIUM CHLORIDE 40 MEQ: 2 INJECTION, SOLUTION, CONCENTRATE INTRAVENOUS at 16:45

## 2025-04-16 RX ADMIN — ONDANSETRON 4 MG: 2 INJECTION INTRAMUSCULAR; INTRAVENOUS at 18:13

## 2025-04-16 RX ADMIN — CEFTRIAXONE 1000 MG: 1 INJECTION, SOLUTION INTRAVENOUS at 08:39

## 2025-04-16 RX ADMIN — POTASSIUM CHLORIDE 20 MEQ: 14.9 INJECTION, SOLUTION INTRAVENOUS at 09:47

## 2025-04-16 RX ADMIN — POTASSIUM CHLORIDE 40 MEQ: 2 INJECTION, SOLUTION, CONCENTRATE INTRAVENOUS at 19:29

## 2025-04-16 RX ADMIN — POTASSIUM CHLORIDE 20 MEQ: 14.9 INJECTION, SOLUTION INTRAVENOUS at 12:06

## 2025-04-16 RX ADMIN — ESCITALOPRAM OXALATE 20 MG: 20 TABLET ORAL at 21:42

## 2025-04-16 RX ADMIN — POTASSIUM CHLORIDE 40 MEQ: 2 INJECTION, SOLUTION, CONCENTRATE INTRAVENOUS at 21:42

## 2025-04-16 RX ADMIN — AMITRIPTYLINE HYDROCHLORIDE 75 MG: 25 TABLET, FILM COATED ORAL at 21:42

## 2025-04-16 RX ADMIN — POTASSIUM CHLORIDE AND SODIUM CHLORIDE 100 ML/HR: 900; 300 INJECTION, SOLUTION INTRAVENOUS at 06:39

## 2025-04-16 RX ADMIN — ONDANSETRON 4 MG: 2 INJECTION INTRAMUSCULAR; INTRAVENOUS at 08:48

## 2025-04-16 RX ADMIN — POTASSIUM CHLORIDE 20 MEQ: 14.9 INJECTION, SOLUTION INTRAVENOUS at 14:11

## 2025-04-16 NOTE — ASSESSMENT & PLAN NOTE
Lab Results   Component Value Date    EGFR 70 04/16/2025    EGFR 66 04/15/2025    EGFR 57 04/15/2025    CREATININE 1.00 04/16/2025    CREATININE 1.05 04/15/2025    CREATININE 1.18 04/15/2025   Baseline creatinine around 1.0 mg/dL.

## 2025-04-16 NOTE — ASSESSMENT & PLAN NOTE
History of gastric bypass in 2014, w/ L chest port for infusion  Recurrent electrolyte displacement for many years.   On daily potassium infusion, monthly vitamin B12 injection and weekly vitamin D 67066N oral supplements  - Avoid NSAIDs   - f/u GI outpatient

## 2025-04-16 NOTE — ASSESSMENT & PLAN NOTE
Patient has had instances of hypokalemia in the past that has led to hospitalization.  She is followed by IV nephrologists and has had full workup in the past and requires daily infusions of potassium due to poor oral absorption.  The main etiology of the patient's hypokalemia in the chronic setting is lack of GI absorption along with increased losses via the GI tract/chronic diarrhea from gastric bypass.  She has had minimal urine losses of potassium.  During the past 3 days, she started to feel ill after developing a sore throat and hoarse voice for which she was seen in the ER and diagnosed with acute laryngeal pharyngitis.  Prior to this, her potassiums have been normal but recently they have been trending down and this acute illness may be the reason why.  On top of having nausea, vomiting and poor p.o. intake this likely threw off her potassium balance.  Currently her potassium level is at 2.5.  Etiology of the acute hypokalemia is most likely due to GI losses from diarrhea along with vomiting which can cause low potassium from other mechanisms.  Current plan is to monitor patient on telemetry given her symptoms of numbness/tingling in her face and chest pressure and provide IV potassium chloride infusions.      April 14: Potassium 2.5 mmol/L at 04:45 AM with potassium chloride 20 mEq x 1 IV and 40 mEq twice daily PO ordered. Repeat K 3.2 mmol/L at 14:16 pm. Continue oral Kchlor 40 mEq twice daily. Recheck BMP in AM.     April 15: Potassium 2.7 mmol/L 4/15, decreased from 3.2 mmol/L at 2 PM on 4/14.  Currently ordered potassium 40 mEq x 4 p.o., 20 mEq x 3 IV in addition to NS with 40mEq/L at 100 ml/hr for a total of 156 mEq of potassium over 24 hours.  Outpatient patient receives 80 mEq of potassium daily IV at home over 8 hours due to poor oral absorption. Will discontinue oral potassium with history of inadequate oral absorption and chronic home IV potassium.  Will recheck BMP at 6 PM.    April 16: Serum  potassium level down to 3.1 mmol/L.  Will provide 60 mill equivalents of IV potassium chloride this morning, and as discussed with attending hospitalist continue to avoid oral potassium as it tends to not be absorbed through years of care management of this patient in the inpatient and outpatient setting.  Will repeat a stat BMP at around 3 PM today and depending on how the patient feels, may be okay for discharge from the renal standpoint.  Patient typically takes 80 mill equivalents of IV potassium chloride overnight as her standing order, sometimes increasing depending on clinical situations.

## 2025-04-16 NOTE — PLAN OF CARE
Problem: PAIN - ADULT  Goal: Verbalizes/displays adequate comfort level or baseline comfort level  Description: Interventions:- Encourage patient to monitor pain and request assistance- Assess pain using appropriate pain scale- Administer analgesics based on type and severity of pain and evaluate response- Implement non-pharmacological measures as appropriate and evaluate response- Consider cultural and social influences on pain and pain management- Notify physician/advanced practitioner if interventions unsuccessful or patient reports new pain  Outcome: Progressing     Problem: INFECTION - ADULT  Goal: Absence or prevention of progression during hospitalization  Description: INTERVENTIONS:- Assess and monitor for signs and symptoms of infection- Monitor lab/diagnostic results- Monitor all insertion sites, i.e. indwelling lines, tubes, and drains- Monitor endotracheal if appropriate and nasal secretions for changes in amount and color- [place] appropriate cooling/warming therapies per order- Administer medications as ordered- Instruct and encourage patient and family to use good hand hygiene technique- Identify and instruct in appropriate isolation precautions for identified infection/condition  Outcome: Progressing  Goal: Absence of fever/infection during neutropenic period  Description: INTERVENTIONS:- Monitor WBC  Outcome: Progressing     Problem: SAFETY ADULT  Goal: Patient will remain free of falls  Description: INTERVENTIONS:- Educate patient/family on patient safety including physical limitations- Instruct patient to call for assistance with activity - Consult OT/PT to assist with strengthening/mobility - Keep Call bell within reach- Keep bed low and locked with side rails adjusted as appropriate- Keep care items and personal belongings within reach- Initiate and maintain comfort rounds- Make Fall Risk Sign visible to staff- Offer Toileting every 2 Hours, in advance of need- Initiate/Maintain Bed/chair  alarm- Obtain necessary fall risk management equipment: non slip - Apply yellow socks and bracelet for high fall risk patients- Consider moving patient to room near nurses station  Outcome: Progressing  Goal: Maintain or return to baseline ADL function  Description: INTERVENTIONS:-  Assess patient's ability to carry out ADLs; assess patient's baseline for ADL function and identify physical deficits which impact ability to perform ADLs (bathing, care of mouth/teeth, toileting, grooming, dressing, etc.)- Assess/evaluate cause of self-care deficits - Assess range of motion- Assess patient's mobility; develop plan if impaired- Assess patient's need for assistive devices and provide as appropriate- Encourage maximum independence but intervene and supervise when necessary- Involve family in performance of ADLs- Assess for home care needs following discharge - Consider OT consult to assist with ADL evaluation and planning for discharge- Provide patient education as appropriate  Outcome: Progressing  Goal: Maintains/Returns to pre admission functional level  Description: INTERVENTIONS:- Perform AM-PAC 6 Click Basic Mobility/ Daily Activity assessment daily.- Set and communicate daily mobility goal to care team and patient/family/caregiver. - Collaborate with rehabilitation services on mobility goals if consulted- Perform Range of Motion 3 times a day.- Reposition patient every 2 hours.- Dangle patient 3 times a day- Stand patient 3 times a day- Ambulate patient 3 times a day- Out of bed to chair 3 times a day - Out of bed for meals 3 times a day- Out of bed for toileting- Record patient progress and toleration of activity level   Outcome: Progressing     Problem: DISCHARGE PLANNING  Goal: Discharge to home or other facility with appropriate resources  Description: INTERVENTIONS:- Identify barriers to discharge w/patient and caregiver- Arrange for needed discharge resources and transportation as appropriate- Identify  discharge learning needs (meds, wound care, etc.)- Arrange for interpretive services to assist at discharge as needed- Refer to Case Management Department for coordinating discharge planning if the patient needs post-hospital services based on physician/advanced practitioner order or complex needs related to functional status, cognitive ability, or social support system  Outcome: Progressing     Problem: Knowledge Deficit  Goal: Patient/family/caregiver demonstrates understanding of disease process, treatment plan, medications, and discharge instructions  Description: Complete learning assessment and assess knowledge base.Interventions:- Provide teaching at level of understanding- Provide teaching via preferred learning methods  Outcome: Progressing     Problem: Nutrition/Hydration-ADULT  Goal: Nutrient/Hydration intake appropriate for improving, restoring or maintaining nutritional needs  Description: Monitor and assess patient's nutrition/hydration status for malnutrition. Collaborate with interdisciplinary team and initiate plan and interventions as ordered.  Monitor patient's weight and dietary intake as ordered or per policy. Utilize nutrition screening tool and intervene as necessary. Determine patient's food preferences and provide high-protein, high-caloric foods as appropriate. INTERVENTIONS:- Monitor oral intake, urinary output, labs, and treatment plans- Assess nutrition and hydration status and recommend course of action- Evaluate amount of meals eaten- Assist patient with eating if necessary - Allow adequate time for meals- Recommend/ encourage appropriate diets, oral nutritional supplements, and vitamin/mineral supplements- Order, calculate, and assess calorie counts as needed- Recommend, monitor, and adjust tube feedings and TPN/PPN based on assessed needs- Assess need for intravenous fluids- Provide specific nutrition/hydration education as appropriate- Include patient/family/caregiver in decisions  related to nutrition  Outcome: Progressing

## 2025-04-16 NOTE — PROGRESS NOTES
Progress Note - Hospitalist   Name: Brittani Patino 40 y.o. female I MRN: [id number]  Unit/Bed#: 422-01 I Date of Admission: 4/13/2025   Date of Service: 4/16/2025 I Hospital Day: 3    Assessment & Plan  Weakness  Generalized weakness for past 2-3 days.   Patient was on Augmentin and medrol dose pack for laryngitis and didn't tolerate PO diet at home for last few days. Multiple nausea and vomiting episodes. Notices dysuria on 04/13  Fatigue likely 2/2 recent dehydration and URI or UTI  In ED, found to have electrolyte displacement, potassium 2.5 and sodium 131  04/15: hyponatremia resolved, still hypokalemic 2.7    4/16 Weakness resolved but she's anxious that her K did not improve    Hypokalemia  Presented with potassium 2.5 (baseline ~4.0)  04/13: Replete with total IV potassium 60mEq, and on IV fluids w/ potassium 20mEq; potassium sill remain same  04/14: received 160mEq of potassium  04/15: potassium still < 3.5  4/16 remains at 3.1, discussed with nephro she received 40meq IV KCL this morning and K did not improve. Will order 40meq q6 x 4 doses now and recheck bmp in am    - on telemetry  - replete potassium with scheduled PO potassium 40mEq q6hr and IV potassium 20mEq x 3 doses  - hold home med aldactone 12.5mg daily  - check morning am lab  UTI (urinary tract infection)  Dysuria on 04/13  Received diflucan x 1 and completed 3 days or rocephin   Hx of gastric bypass  History of gastric bypass in 2014, w/ L chest port for infusion  Recurrent electrolyte displacement for many years.   On daily potassium infusion, monthly vitamin B12 injection and weekly vitamin D 10663C oral supplements  - Avoid NSAIDs   - f/u GI outpatient   Stage 3 chronic kidney disease (HCC)  Lab Results   Component Value Date    EGFR 62 04/16/2025    EGFR 70 04/16/2025    EGFR 66 04/15/2025    CREATININE 1.10 04/16/2025    CREATININE 1.00 04/16/2025    CREATININE 1.05 04/15/2025   See TICO A/P    Migraine  Continue home med amitriptyline 75mg  daily  Hx of anxiety disorder  Continue home med lexapro 20mg daily  Acute on chronic anemia  Will start PO iron on discharge     VTE Pharmacologic Prophylaxis: VTE Score: 3 Moderate Risk (Score 3-4) - Pharmacological DVT Prophylaxis Ordered: heparin.    Mobility:   Basic Mobility Inpatient Raw Score: 24  JH-HLM Goal: 8: Walk 250 feet or more  JH-HLM Achieved: 7: Walk 25 feet or more  JH-HLM Goal achieved. Continue to encourage appropriate mobility.    Patient Centered Rounds: I performed bedside rounds with nursing staff today.   Discussions with Specialists or Other Care Team Provider: Nephro    Education and Discussions with Family / Patient:     Current Length of Stay: 3 day(s)  Current Patient Status: Inpatient   Certification Statement: The patient will continue to require additional inpatient hospital stay due to Hypokalemia  Discharge Plan: Anticipate discharge tomorrow to home.    Code Status: Level 1 - Full Code    Subjective   Seen and examined, feels well but anxious that K did not improve with potassium infusion. She's agreeable to stay one more night and we will aggressively replete K     Objective :  Temp:  [97.6 °F (36.4 °C)-98.6 °F (37 °C)] 98.6 °F (37 °C)  HR:  [64-94] 80  BP: ()/(47-64) 104/61  Resp:  [15-19] 15  SpO2:  [97 %-99 %] 99 %  O2 Device: None (Room air)    Body mass index is 23.1 kg/m².     Input and Output Summary (last 24 hours):     Intake/Output Summary (Last 24 hours) at 4/16/2025 1440  Last data filed at 4/16/2025 0815  Gross per 24 hour   Intake 640 ml   Output --   Net 640 ml       Physical Exam  Vitals reviewed.   Constitutional:       General: She is not in acute distress.     Appearance: She is normal weight.   Cardiovascular:      Rate and Rhythm: Normal rate and regular rhythm.      Pulses: Normal pulses.      Heart sounds: No murmur heard.  Pulmonary:      Effort: Pulmonary effort is normal. No respiratory distress.      Breath sounds: No wheezing or rales.    Abdominal:      General: Abdomen is flat. Bowel sounds are normal. There is no distension.      Tenderness: There is no abdominal tenderness. There is no guarding.   Musculoskeletal:      Right lower leg: No edema.      Left lower leg: No edema.   Skin:     Capillary Refill: Capillary refill takes 2 to 3 seconds.   Neurological:      General: No focal deficit present.      Mental Status: She is alert. Mental status is at baseline.           Lines/Drains:  Lines/Drains/Airways       Active Status       Name Placement date Placement time Site Days    Port A Cath 02/09/24 Left Chest 02/09/24  1528  Chest  431                    Central Line:  Goal for removal: Port accessed. Will de-access as appropriate.         Telemetry:  Telemetry Orders (From admission, onward)               24 Hour Telemetry Monitoring  Continuous x 24 Hours (Telem)        Expiring   Question:  Reason for 24 Hour Telemetry  Answer:  Metabolic/electrolyte disturbance with high probability of dysrhythmia. K level <3 or >6 OR KCL infusion >10mEq/hr                     Telemetry Reviewed: Normal Sinus Rhythm  Indication for Continued Telemetry Use: Metabolic/electrolyte disturbance with high probability of dysrhythmia               Lab Results: I have reviewed the following results:   Results from last 7 days   Lab Units 04/16/25  0638   WBC Thousand/uL 6.22   HEMOGLOBIN g/dL 9.0*   HEMATOCRIT % 29.0*   PLATELETS Thousands/uL 245   SEGS PCT % 68   LYMPHO PCT % 24   MONO PCT % 6   EOS PCT % 1     Results from last 7 days   Lab Units 04/16/25  1330 04/14/25  1416 04/14/25  0456   SODIUM mmol/L 137   < > 138   POTASSIUM mmol/L 3.1*   < > 2.5*   CHLORIDE mmol/L 108   < > 107   CO2 mmol/L 22   < > 23   BUN mg/dL 9   < > 23   CREATININE mg/dL 1.10   < > 1.38*   ANION GAP mmol/L 7   < > 8   CALCIUM mg/dL 7.4*   < > 7.5*   ALBUMIN g/dL  --   --  3.5   TOTAL BILIRUBIN mg/dL  --   --  0.37   ALK PHOS U/L  --   --  42   ALT U/L  --   --  11   AST U/L  --   --   14   GLUCOSE RANDOM mg/dL 101   < > 89    < > = values in this interval not displayed.         Results from last 7 days   Lab Units 04/13/25  0907   POC GLUCOSE mg/dl 146*         Results from last 7 days   Lab Units 04/13/25  1418   LACTIC ACID mmol/L 0.8       Recent Cultures (last 7 days):         Imaging Results Review: No pertinent imaging studies reviewed.  Other Study Results Review: No additional pertinent studies reviewed.    Last 24 Hours Medication List:     Current Facility-Administered Medications:     acetaminophen (TYLENOL) tablet 650 mg, Q6H PRN    amitriptyline (ELAVIL) tablet 75 mg, HS    cefTRIAXone (ROCEPHIN) IVPB (premix in dextrose) 1,000 mg 50 mL, Q24H, Last Rate: 1,000 mg (04/16/25 0839)    escitalopram (LEXAPRO) tablet 20 mg, HS    heparin (porcine) subcutaneous injection 5,000 Units, Q8H MIKE    meclizine (ANTIVERT) tablet 12.5 mg, TID PRN    ondansetron (ZOFRAN) injection 4 mg, Q4H PRN    potassium chloride 40 mEq in sodium chloride 0.9 % 100 mL IVPB, Q2H    Administrative Statements   Today, Patient Was Seen By: Khushi Garcia MD      **Please Note: This note may have been constructed using a voice recognition system.**

## 2025-04-16 NOTE — ASSESSMENT & PLAN NOTE
Lab Results   Component Value Date    EGFR 62 04/16/2025    EGFR 70 04/16/2025    EGFR 66 04/15/2025    CREATININE 1.10 04/16/2025    CREATININE 1.00 04/16/2025    CREATININE 1.05 04/15/2025   See TICO A/P

## 2025-04-16 NOTE — ASSESSMENT & PLAN NOTE
Presented with potassium 2.5 (baseline ~4.0)  04/13: Replete with total IV potassium 60mEq, and on IV fluids w/ potassium 20mEq; potassium sill remain same  04/14: received 160mEq of potassium  04/15: potassium still < 3.5  4/16 remains at 3.1, discussed with nephro she received 40meq IV KCL this morning and K did not improve. Will order 40meq q6 x 4 doses now and recheck bmp in am    - on telemetry  - replete potassium with scheduled PO potassium 40mEq q6hr and IV potassium 20mEq x 3 doses  - hold home med aldactone 12.5mg daily  - check morning am lab

## 2025-04-16 NOTE — ASSESSMENT & PLAN NOTE
Generalized weakness for past 2-3 days.   Patient was on Augmentin and medrol dose pack for laryngitis and didn't tolerate PO diet at home for last few days. Multiple nausea and vomiting episodes. Notices dysuria on 04/13  Fatigue likely 2/2 recent dehydration and URI or UTI  In ED, found to have electrolyte displacement, potassium 2.5 and sodium 131  04/15: hyponatremia resolved, still hypokalemic 2.7    4/16 Weakness resolved but she's anxious that her K did not improve

## 2025-04-16 NOTE — PROGRESS NOTES
Progress Note - Nephrology   Name: Brittani Patino 40 y.o. female I MRN: [id number]  Unit/Bed#: 422-01 I Date of Admission: 4/13/2025   Date of Service: 4/16/2025 I Hospital Day: 3     Assessment & Plan  Weakness    Hypokalemia  Patient has had instances of hypokalemia in the past that has led to hospitalization.  She is followed by IV nephrologists and has had full workup in the past and requires daily infusions of potassium due to poor oral absorption.  The main etiology of the patient's hypokalemia in the chronic setting is lack of GI absorption along with increased losses via the GI tract/chronic diarrhea from gastric bypass.  She has had minimal urine losses of potassium.  During the past 3 days, she started to feel ill after developing a sore throat and hoarse voice for which she was seen in the ER and diagnosed with acute laryngeal pharyngitis.  Prior to this, her potassiums have been normal but recently they have been trending down and this acute illness may be the reason why.  On top of having nausea, vomiting and poor p.o. intake this likely threw off her potassium balance.  Currently her potassium level is at 2.5.  Etiology of the acute hypokalemia is most likely due to GI losses from diarrhea along with vomiting which can cause low potassium from other mechanisms.  Current plan is to monitor patient on telemetry given her symptoms of numbness/tingling in her face and chest pressure and provide IV potassium chloride infusions.      April 14: Potassium 2.5 mmol/L at 04:45 AM with potassium chloride 20 mEq x 1 IV and 40 mEq twice daily PO ordered. Repeat K 3.2 mmol/L at 14:16 pm. Continue oral Kchlor 40 mEq twice daily. Recheck BMP in AM.     April 15: Potassium 2.7 mmol/L 4/15, decreased from 3.2 mmol/L at 2 PM on 4/14.  Currently ordered potassium 40 mEq x 4 p.o., 20 mEq x 3 IV in addition to NS with 40mEq/L at 100 ml/hr for a total of 156 mEq of potassium over 24 hours.  Outpatient patient receives 80 mEq  of potassium daily IV at home over 8 hours due to poor oral absorption. Will discontinue oral potassium with history of inadequate oral absorption and chronic home IV potassium.  Will recheck BMP at 6 PM.    April 16: Serum potassium level down to 3.1 mmol/L.  Will provide 60 mill equivalents of IV potassium chloride this morning, and as discussed with attending hospitalist continue to avoid oral potassium as it tends to not be absorbed through years of care management of this patient in the inpatient and outpatient setting.  Will repeat a stat BMP at around 3 PM today and depending on how the patient feels, may be okay for discharge from the renal standpoint.  Patient typically takes 80 mill equivalents of IV potassium chloride overnight as her standing order, sometimes increasing depending on clinical situations.    Stage 3 chronic kidney disease (HCC)  Lab Results   Component Value Date    EGFR 70 04/16/2025    EGFR 66 04/15/2025    EGFR 57 04/15/2025    CREATININE 1.00 04/16/2025    CREATININE 1.05 04/15/2025    CREATININE 1.18 04/15/2025   Baseline creatinine around 1.0 mg/dL.   Hx of gastric bypass  Poor absorption of potassium by mouth exacerbated by recent infection.  Hx of anxiety disorder    Migraine    UTI (urinary tract infection)  UA as noted above. Currently receiving ceftriaxone. Continue management per hospitalist.   Acute on chronic anemia      Case discussed with hospitalist.  As noted above, will provide the patient with additional potassium supplementation IV this morning.  Will discontinue volume expansion as the patient is appropriate from that standpoint.  Check stat BMP at 3 PM today.    Follow up reason for today's visit:     Weakness    Patient Active Problem List   Diagnosis    Hypokalemia    History of gastric bypass    Migraine without aura and without status migrainosus, not intractable    Left-sided weakness    Transaminitis    Hypocalcemia    Hypophosphatemia    Acute on chronic  "anemia    Vitamin D deficiency    Elevated CPK    Vitamin B12 deficiency    Cervical lymphadenopathy    Fatigue    Paresthesia of both lower extremities    Paresthesias    B12 deficiency    Hx of gastric bypass    GERD (gastroesophageal reflux disease)    Chronic migraine without aura without status migrainosus, not intractable    Other intestinal malabsorption    Right ovarian cyst    Chest pain    Left upper quadrant abdominal pain    Vertigo    Stress fracture of right foot with routine healing    Normal anion gap metabolic acidosis    Middle ear effusion    Abnormal LFTs    Gas pain    Acute on chronic abdominal pain    Cellulitis of chest wall    Acute pancreatitis    Open wound / skin tear of right chest wall    Other hemorrhoids    Iron deficiency    Intractable vomiting    Hx of anxiety disorder    Psychogenic nonepileptic spells    Episode of shaking    Perianal abscess    Idiopathic acute pancreatitis without infection or necrosis    Left ureteral calculus    Leukocytosis    Enterocolitis    Intussusception (HCC)    Essential hypertension    History of intussusception    Nephrolithiasis    Iron deficiency anemia due to dietary causes    Magnesium deficiency    Stage 3 chronic kidney disease (HCC)    Weakness    Migraine    UTI (urinary tract infection)         Subjective:   Patient feeling significantly proved over the last 24 hours, she is eating and drinking well at this time.    Objective:     Vitals: Blood pressure 111/60, pulse 77, temperature 97.6 °F (36.4 °C), temperature source Oral, resp. rate 15, height 5' 2\" (1.575 m), weight 57.3 kg (126 lb 5.2 oz), SpO2 99%.,Body mass index is 23.1 kg/m².    Weight (last 2 days)       None              Intake/Output Summary (Last 24 hours) at 4/16/2025 0977  Last data filed at 4/16/2025 0815  Gross per 24 hour   Intake 1060 ml   Output --   Net 1060 ml     I/O last 3 completed shifts:  In: 1440 [P.O.:1440]  Out: -          Physical Exam: /60 (BP " "Location: Right arm)   Pulse 77   Temp 97.6 °F (36.4 °C) (Oral)   Resp 15   Ht 5' 2\" (1.575 m)   Wt 57.3 kg (126 lb 5.2 oz)   LMP  (LMP Unknown)   SpO2 99%   BMI 23.10 kg/m²     General Appearance:    Alert, cooperative, no distress, appears stated age   Head:    Normocephalic, without obvious abnormality, atraumatic   Eyes:    Conjunctiva/corneas clear   Ears:    Normal external ears   Nose:   Nares normal, septum midline, mucosa normal, no drainage    or sinus tenderness   Throat:   Lips, mucosa, and tongue normal; teeth and gums normal   Neck:   Supple   Back:     Symmetric, no curvature, ROM normal, no CVA tenderness   Lungs:     Clear to auscultation bilaterally, respirations unlabored   Chest wall:    No tenderness or deformity   Heart:    Regular rate and rhythm, S1 and S2 normal, no murmur, rub   or gallop   Abdomen:     Soft, non-tender, bowel sounds active   Extremities:   Extremities normal, atraumatic, no cyanosis or edema   Skin:   Skin color, texture, turgor normal, no rashes or lesions   Lymph nodes:   Cervical normal   Neurologic:   CNII-XII intact            Lab, Imaging and other studies: I have personally reviewed pertinent labs.  CBC:   Lab Results   Component Value Date    WBC 6.22 04/16/2025    HGB 9.0 (L) 04/16/2025    HCT 29.0 (L) 04/16/2025    MCV 81 (L) 04/16/2025     04/16/2025    RBC 3.58 (L) 04/16/2025    MCH 25.1 (L) 04/16/2025    MCHC 31.0 (L) 04/16/2025    RDW 15.9 (H) 04/16/2025    MPV 10.1 04/16/2025    NRBC 0 04/16/2025     CMP:   Lab Results   Component Value Date    K 3.1 (L) 04/16/2025     04/16/2025    CO2 23 04/16/2025    BUN 11 04/16/2025    CREATININE 1.00 04/16/2025    CALCIUM 7.4 (L) 04/16/2025    EGFR 70 04/16/2025       .  Results from last 7 days   Lab Units 04/16/25  0743 04/15/25  1756 04/15/25  0446 04/14/25  1416 04/14/25  0456 04/13/25  1418 04/13/25  0858   POTASSIUM mmol/L 3.1* 3.4* 2.7*   < > 2.5*   < > 2.5*   CHLORIDE mmol/L 107 107 108   < " "> 107   < > 96   CO2 mmol/L 23 22 23   < > 23   < > 19*   BUN mg/dL 11 12 12   < > 23   < > 39*   CREATININE mg/dL 1.00 1.05 1.18   < > 1.38*   < > 2.16*   CALCIUM mg/dL 7.4* 7.3* 7.4*   < > 7.5*   < > 9.1   ALK PHOS U/L  --   --   --   --  42  --  61   ALT U/L  --   --   --   --  11  --  17   AST U/L  --   --   --   --  14  --  18    < > = values in this interval not displayed.         Phosphorus:   Lab Results   Component Value Date    PHOS 2.1 (L) 04/16/2025     Magnesium: No results found for: \"MG\"  Urinalysis: No results found for: \"COLORU\", \"CLARITYU\", \"SPECGRAV\", \"PHUR\", \"LEUKOCYTESUR\", \"NITRITE\", \"PROTEINUA\", \"GLUCOSEU\", \"KETONESU\", \"BILIRUBINUR\", \"BLOODU\"  Ionized Calcium: No results found for: \"CAION\"  Coagulation: No results found for: \"PT\", \"INR\", \"APTT\"  Troponin: No results found for: \"TROPONINI\"  ABG: No results found for: \"PHART\", \"QUY6GDI\", \"PO2ART\", \"ENL8QLL\", \"Q9ZKTKOA\", \"BEART\", \"SOURCE\"  Radiology review:     IMAGING  Procedure: CT abdomen pelvis wo contrast  Result Date: 4/13/2025  Narrative: CT ABDOMEN AND PELVIS WITHOUT IV CONTRAST INDICATION: TICO. Weakness fatigue vomiting. History of hysterectomy appendectomy cholecystectomy gastric bypass. COMPARISON: CT [address] TECHNIQUE: CT examination of the abdomen and pelvis was performed without intravenous contrast. Multiplanar 2D reformatted images were created from the source data. This examination, like all CT scans performed in the [facility], was performed utilizing techniques to minimize radiation dose exposure, including the use of iterative reconstruction and automated exposure control. Radiation dose length product (DLP) for this visit: 594.28 mGy-cm Enteric Contrast: Not administered. FINDINGS: ABDOMEN LOWER CHEST: No clinically significant abnormality in the visualized lower chest. LIVER/BILIARY TREE: Unremarkable. GALLBLADDER: Post cholecystectomy. SPLEEN: Unremarkable. PANCREAS: Unremarkable. ADRENAL GLANDS: Unremarkable. " KIDNEYS/URETERS: There are a few punctate nonobstructing stones in each kidney. No hydronephroureter. STOMACH AND BOWEL: Gastric bypass changes. Moderate diffuse fluid distention of the large bowel. Normal small bowel caliber. APPENDIX: No findings to suggest appendicitis. ABDOMINOPELVIC CAVITY: No ascites. No pneumoperitoneum. No lymphadenopathy. VESSELS: Unremarkable for patient's age. PELVIS REPRODUCTIVE ORGANS: Post hysterectomy. URINARY BLADDER: Unremarkable. ABDOMINAL WALL/INGUINAL REGIONS: Unremarkable. BONES: No acute fracture or suspicious osseous lesion.     Impression: Diffuse fluid distention of the large bowel. Diagnostic considerations include infectious/inflammatory diarrheal state, malabsorption. Nephrolithiasis. Workstation performed: [id number]         Current Facility-Administered Medications:     acetaminophen (TYLENOL) tablet 650 mg, Q6H PRN    amitriptyline (ELAVIL) tablet 75 mg, HS    cefTRIAXone (ROCEPHIN) IVPB (premix in dextrose) 1,000 mg 50 mL, Q24H, Last Rate: 1,000 mg (04/16/25 0839)    escitalopram (LEXAPRO) tablet 20 mg, HS    heparin (porcine) subcutaneous injection 5,000 Units, Q8H MIKE    meclizine (ANTIVERT) tablet 12.5 mg, TID PRN    ondansetron (ZOFRAN) injection 4 mg, Q4H PRN    potassium chloride 20 mEq IVPB (premix), Q2H    sodium phosphate 21 mmol in dextrose 5 % 250 mL Infusion, Once  Medications Discontinued During This Encounter   Medication Reason    spironolactone (ALDACTONE) tablet 12.5 mg     enoxaparin (LOVENOX) subcutaneous injection 40 mg     ergocalciferol (VITAMIN D2) capsule 50,000 Units     ondansetron (ZOFRAN-ODT) dispersible tablet 4 mg     potassium chloride 40 mEq in sodium chloride 0.9 % 100 mL IVPB     potassium chloride 40 mEq in sodium chloride 0.9 % 100 mL IVPB     sodium chloride 0.9 % with KCl 20 mEq/L infusion (premix)     potassium chloride 20 mEq IVPB (premix)     potassium chloride (Klor-Con M20) CR tablet 40 mEq     sodium chloride 0.9 % with KCl  40 mEq/L infusion (premix)        Juan Carlos Ricketts, DO      This progress note was produced in part using a dictation device which may document imprecise wording from author's original intent.

## 2025-04-17 ENCOUNTER — TELEPHONE (OUTPATIENT)
Dept: OTHER | Facility: HOSPITAL | Age: 40
End: 2025-04-17

## 2025-04-17 VITALS
BODY MASS INDEX: 23.25 KG/M2 | TEMPERATURE: 97.8 F | DIASTOLIC BLOOD PRESSURE: 50 MMHG | HEIGHT: 62 IN | WEIGHT: 126.32 LBS | HEART RATE: 74 BPM | RESPIRATION RATE: 16 BRPM | SYSTOLIC BLOOD PRESSURE: 102 MMHG | OXYGEN SATURATION: 100 %

## 2025-04-17 PROBLEM — R53.1 WEAKNESS: Status: RESOLVED | Noted: 2025-04-13 | Resolved: 2025-04-17

## 2025-04-17 PROBLEM — E87.6 HYPOKALEMIA: Status: RESOLVED | Noted: 2018-07-07 | Resolved: 2025-04-17

## 2025-04-17 PROBLEM — N39.0 UTI (URINARY TRACT INFECTION): Status: RESOLVED | Noted: 2025-04-14 | Resolved: 2025-04-17

## 2025-04-17 LAB
ANION GAP SERPL CALCULATED.3IONS-SCNC: 4 MMOL/L (ref 4–13)
BUN SERPL-MCNC: 8 MG/DL (ref 5–25)
CALCIUM SERPL-MCNC: 7.6 MG/DL (ref 8.4–10.2)
CHLORIDE SERPL-SCNC: 112 MMOL/L (ref 96–108)
CO2 SERPL-SCNC: 22 MMOL/L (ref 21–32)
CREAT SERPL-MCNC: 0.87 MG/DL (ref 0.6–1.3)
GFR SERPL CREATININE-BSD FRML MDRD: 83 ML/MIN/1.73SQ M
GLUCOSE SERPL-MCNC: 82 MG/DL (ref 65–140)
POTASSIUM SERPL-SCNC: 4.5 MMOL/L (ref 3.5–5.3)
SODIUM SERPL-SCNC: 138 MMOL/L (ref 135–147)

## 2025-04-17 PROCEDURE — 99239 HOSP IP/OBS DSCHRG MGMT >30: CPT | Performed by: FAMILY MEDICINE

## 2025-04-17 PROCEDURE — 80048 BASIC METABOLIC PNL TOTAL CA: CPT | Performed by: FAMILY MEDICINE

## 2025-04-17 RX ORDER — FERROUS SULFATE 324(65)MG
324 TABLET, DELAYED RELEASE (ENTERIC COATED) ORAL
Qty: 30 TABLET | Refills: 0 | Status: SHIPPED | OUTPATIENT
Start: 2025-04-17 | End: 2025-04-17

## 2025-04-17 RX ORDER — IRON HEME POLYPEPTIDE 12 MG
12 TABLET ORAL DAILY
Qty: 30 TABLET | Refills: 0 | Status: SHIPPED | OUTPATIENT
Start: 2025-04-17

## 2025-04-17 RX ADMIN — POTASSIUM CHLORIDE 40 MEQ: 149 INJECTION, SOLUTION, CONCENTRATE INTRAVENOUS at 02:03

## 2025-04-17 NOTE — DISCHARGE SUMMARY
Discharge Summary - Hospitalist   Name: Brittani Patino 40 y.o. female I MRN: [id number]  Unit/Bed#: 422-01 I Date of Admission: 4/13/2025   Date of Service: 4/17/2025 I Hospital Day: 4     Assessment & Plan  Weakness (Resolved: 4/17/2025)  Generalized weakness for past 2-3 days.   Patient was on Augmentin and medrol dose pack for laryngitis and didn't tolerate PO diet at home for last few days. Multiple nausea and vomiting episodes. Notices dysuria on 04/13  Fatigue likely 2/2 recent dehydration and URI or UTI  In ED, found to have electrolyte displacement, potassium 2.5 and sodium 131  04/15: hyponatremia resolved, still hypokalemic 2.7  4/16: Weakness resolved but she's anxious that her K did not improve  04/17: potassium back to 4.5 and weakness resolved    - DC patient to home on 04/17, will resume potassium infusion as scheduled before  -  f/u with GI and PCP outpatient     UTI (urinary tract infection) (Resolved: 4/17/2025)  Dysuria on 04/13  Received diflucan x 1 and completed 3 days or rocephin   Hx of gastric bypass  History of gastric bypass in 2014, w/ L chest port for infusion  Recurrent electrolyte displacement for many years.   On daily potassium infusion, monthly vitamin B12 injection and weekly vitamin D 36051U oral supplements  - Avoid NSAIDs   - f/u GI outpatient   Stage 3 chronic kidney disease (HCC)  Lab Results   Component Value Date    EGFR 83 04/17/2025    EGFR 62 04/16/2025    EGFR 70 04/16/2025    CREATININE 0.87 04/17/2025    CREATININE 1.10 04/16/2025    CREATININE 1.00 04/16/2025   See TCIO A/P    Migraine  Continue home med amitriptyline 75mg daily  Hx of anxiety disorder  Continue home med lexapro 20mg daily  Acute on chronic anemia  Will start PO pro-gregoria 12mg daily on discharge   F/u with PCP outpatient     Medical Problems       Resolved Problems  Date Reviewed: 3/11/2025          Resolved    Hypokalemia 4/17/2025     Resolved by  Mallory Gonsalez DO    TICO (acute kidney injury) ([facility])  4/15/2025     Resolved by  Mallory Gonsalez DO    Hyponatremia 4/14/2025     Resolved by  Mallory Gonsalez DO    Increased anion gap metabolic acidosis 4/14/2025     Resolved by  Mallory Gonsalez DO    * (Principal) Weakness 4/17/2025     Resolved by  Mallory Gonsalez DO    UTI (urinary tract infection) 4/17/2025     Resolved by  Mallory Gonsalez DO        Discharging Physician / Practitioner: Mallory Gonsalez DO  PCP: Clarence Ingram MD  Admission Date:   Admission Orders (From admission, onward)       Ordered        04/13/25 1211  INPATIENT ADMISSION  Once                          Discharge Date: 04/17/25    Consultations During Hospital Stay:  Nephrology    Procedures Performed:   none    Significant Findings / Test Results:   Results Reviewed       Procedure Component Value Units Date/Time    Respiratory Panel 2.1(RP2)with COVID19 [[id number]]  (Normal) Collected: 04/13/25 1457    Lab Status: Final result Specimen: Nasopharyngeal Swab Updated: 04/14/25 1025     Adenovirus Not Detected     Bordetella parapertussis Not Detected     Bordetella pertussis Not Detected     Chlamydia pneumoniae Not Detected     SARS-CoV-2 Not Detected     Coronavirus 229E Not Detected     Coronavirus HKU1 Not Detected     Coronavirus NL63 Not Detected     Coronavirus OC43 Not Detected     Human Metapneumovirus Not Detected     Rhino/Enterovirus Not Detected     Influenza A Not Detected     Influenza B Not Detected     Mycoplasma pneumoniae Not Detected     Parainfluenza 1 Not Detected     Parainfluenza 2 Not Detected     Parainfluenza 3 Not Detected     Parainfluenza 4 Not Detected     Respiratory Syncytial Virus Not Detected    CBC (With Platelets) [[id number]]  (Abnormal) Collected: 04/14/25 0630    Lab Status: Final result Specimen: Blood from Arm, Right Updated: 04/14/25 0647     WBC 6.36 Thousand/uL      RBC 3.73 Million/uL      Hemoglobin 9.5 g/dL      Hematocrit 30.1 %      MCV 81 fL      MCH 25.5 pg      MCHC 31.6 g/dL      RDW 15.5 %      Platelets 276  "Thousands/uL      MPV 10.0 fL     Comprehensive metabolic panel [[id number]]  (Abnormal) Collected: 04/14/25 0456    Lab Status: Final result Specimen: Blood from Arm, Left Updated: 04/14/25 0554     Sodium 138 mmol/L      Potassium 2.5 mmol/L      Chloride 107 mmol/L      CO2 23 mmol/L      ANION GAP 8 mmol/L      BUN 23 mg/dL      Creatinine 1.38 mg/dL      Glucose 89 mg/dL      Calcium 7.5 mg/dL      AST 14 U/L      ALT 11 U/L      Alkaline Phosphatase 42 U/L      Total Protein 5.9 g/dL      Albumin 3.5 g/dL      Total Bilirubin 0.37 mg/dL      eGFR 47 ml/min/1.73sq m     Narrative:      National Kidney Disease Foundation guidelines for Chronic Kidney Disease (CKD):     Stage 1 with normal or high GFR (GFR > 90 mL/min/1.73 square meters)    Stage 2 Mild CKD (GFR = 60-89 mL/min/1.73 square meters)    Stage 3A Moderate CKD (GFR = 45-59 mL/min/1.73 square meters)    Stage 3B Moderate CKD (GFR = 30-44 mL/min/1.73 square meters)    Stage 4 Severe CKD (GFR = 15-29 mL/min/1.73 square meters)    Stage 5 End Stage CKD (GFR <15 mL/min/1.73 square meters)  Note: GFR calculation is accurate only with a steady state creatinine    Magnesium [[id number]]  (Normal) Collected: 04/14/25 0456    Lab Status: Final result Specimen: Blood from Arm, Left Updated: 04/14/25 0554     Magnesium 2.2 mg/dL     Phosphorus [[id number]]  (Normal) Collected: 04/14/25 0456    Lab Status: Final result Specimen: Blood from Arm, Left Updated: 04/14/25 0554     Phosphorus 3.1 mg/dL     Urea nitrogen, urine [[id number]] Collected: 04/13/25 1418    Lab Status: Final result Specimen: Urine, Clean Catch Updated: 04/13/25 2205     Urea Nitrogen, Ur 715 mg/dL     T4, free [[id number]]  (Abnormal) Collected: 04/13/25 0858    Lab Status: Final result Specimen: Blood from Arm, Right Updated: 04/13/25 1753     Free T4 1.13 ng/dL     Narrative:        \"Therapeutic range for patients medicated with thyroid disorders: 0.61-1.24 ng/dL.\"    Blood gas, venous [[id number]] "  (Abnormal) Collected: 04/13/25 1509    Lab Status: Final result Specimen: Blood from Arm, Right Updated: 04/13/25 1523     pH, Uday 7.284     pCO2, Uday 40.2 mm Hg      pO2, Uday 39.4 mm Hg      HCO3, Uday 18.6 mmol/L      Base Excess, Uday -7.5 mmol/L      O2 Content, Uday 11.8 ml/dL      O2 HGB, VENOUS 65.5 %     Sodium, urine, random [[id number]] Collected: 04/13/25 1418    Lab Status: Final result Specimen: Urine, Clean Catch Updated: 04/13/25 1514     Sodium, Ur <10.0 mmol/L     Creatinine, urine, random [[id number]] Collected: 04/13/25 1418    Lab Status: Final result Specimen: Urine, Clean Catch Updated: 04/13/25 1514     Creatinine, Ur 127.5 mg/dL     UA w Reflex to Microscopic w Reflex to Culture [[id number]]  (Abnormal) Collected: 04/13/25 1418    Lab Status: Final result Specimen: Urine, Clean Catch Updated: 04/13/25 1442     Color, UA Yellow     Clarity, UA Turbid     Specific Gravity, UA 1.025     pH, UA 6.5     Leukocytes, UA Small     Nitrite, UA Negative     Protein, UA 30 (1+) mg/dl      Glucose, UA Negative mg/dl      Ketones, UA Trace mg/dl      Urobilinogen, UA <2.0 mg/dl      Bilirubin, UA Negative     Occult Blood, UA Trace    Platelet count [[id number]]  (Normal) Collected: 04/13/25 1418    Lab Status: Final result Specimen: Blood from Arm, Left Updated: 04/13/25 1430     Platelets 361 Thousands/uL      MPV 10.1 fL     Beta Hydroxybutyrate [[id number]]  (Abnormal) Collected: 04/13/25 0858    Lab Status: Final result Specimen: Blood from Arm, Right Updated: 04/13/25 0957     Beta- Hydroxybutyrate 0.28 mmol/L     TSH, 3rd generation with Free T4 reflex [[id number]]  (Abnormal) Collected: 04/13/25 0858    Lab Status: Final result Specimen: Blood from Arm, Right Updated: 04/13/25 0943     TSH 3RD GENERATON 5.153 uIU/mL     Phosphorus [[id number]]  (Abnormal) Collected: 04/13/25 0858    Lab Status: Final result Specimen: Blood from Arm, Right Updated: 04/13/25 0941     Phosphorus 5.1 mg/dL     Comprehensive  metabolic panel [[id number]]  (Abnormal) Collected: 04/13/25 0858    Lab Status: Final result Specimen: Blood from Arm, Right Updated: 04/13/25 0924     Sodium 131 mmol/L      Potassium 2.5 mmol/L      Chloride 96 mmol/L      CO2 19 mmol/L      ANION GAP 16 mmol/L      BUN 39 mg/dL      Creatinine 2.16 mg/dL      Glucose 157 mg/dL      Calcium 9.1 mg/dL      AST 18 U/L      ALT 17 U/L      Alkaline Phosphatase 61 U/L      Total Protein 8.6 g/dL      Albumin 4.9 g/dL      Total Bilirubin 0.44 mg/dL      eGFR 27 ml/min/1.73sq m     Narrative:      National Kidney Disease Foundation guidelines for Chronic Kidney Disease (CKD):     Stage 1 with normal or high GFR (GFR > 90 mL/min/1.73 square meters)    Stage 2 Mild CKD (GFR = 60-89 mL/min/1.73 square meters)    Stage 3A Moderate CKD (GFR = 45-59 mL/min/1.73 square meters)    Stage 3B Moderate CKD (GFR = 30-44 mL/min/1.73 square meters)    Stage 4 Severe CKD (GFR = 15-29 mL/min/1.73 square meters)    Stage 5 End Stage CKD (GFR <15 mL/min/1.73 square meters)  Note: GFR calculation is accurate only with a steady state creatinine    Lipase [[id number]]  (Normal) Collected: 04/13/25 0858    Lab Status: Final result Specimen: Blood from Arm, Right Updated: 04/13/25 0924     Lipase 40 u/L     Magnesium [[id number]]  (Normal) Collected: 04/13/25 0858    Lab Status: Final result Specimen: Blood from Arm, Right Updated: 04/13/25 0924     Magnesium 2.7 mg/dL     FLU/COVID Rapid Antigen (30 min. TAT) - Preferred screening test in ED [[id number]]  (Normal) Collected: 04/13/25 0858    Lab Status: Final result Specimen: Nares from Nose Updated: 04/13/25 0923     SARS COV Rapid Antigen Negative     Influenza A Rapid Antigen Negative     Influenza B Rapid Antigen Negative    Narrative:      This test has been performed using the [facility] Sammi 2 FLU+SARS Antigen test under the Emergency Use Authorization (EUA). This test has been validated by the  and verified by the performing  laboratory. The Sammi uses lateral flow immunofluorescent sandwich assay to detect SARS-COV, Influenza A and Influenza B Antigen.     The [facility] Sammi 2 SARS Antigen test does not differentiate between SARS-CoV and SARS-CoV-2.     Negative results are presumptive and may be confirmed with a molecular assay, if necessary, for patient management. Negative results do not rule out SARS-CoV-2 or influenza infection and should not be used as the sole basis for treatment or patient management decisions. A negative test result may occur if the level of antigen in a sample is below the limit of detection of this test.     Positive results are indicative of the presence of viral antigens, but do not rule out bacterial infection or co-infection with other viruses.     All test results should be used as an adjunct to clinical observations and other information available to the provider.    FOR PEDIATRIC PATIENTS - copy/paste COVID Guidelines URL to browser: [email or web address]    Fingerstick Glucose (POCT) [[id number]]  (Abnormal) Collected: 04/13/25 0907    Lab Status: Final result Specimen: Blood Updated: 04/13/25 0908     POC Glucose 146 mg/dl     CBC and differential [[id number]]  (Abnormal) Collected: 04/13/25 0858    Lab Status: Final result Specimen: Blood from Arm, Right Updated: 04/13/25 0907     WBC 13.74 Thousand/uL      RBC 5.58 Million/uL      Hemoglobin 14.0 g/dL      Hematocrit 43.2 %      MCV 77 fL      MCH 25.1 pg      MCHC 32.4 g/dL      RDW 15.3 %      MPV 9.8 fL      Platelets 449 Thousands/uL      nRBC 0 /100 WBCs      Segmented % 83 %      Immature Grans % 0 %      Lymphocytes % 9 %      Monocytes % 8 %      Eosinophils Relative 0 %      Basophils Relative 0 %      Absolute Neutrophils 11.32 Thousands/µL      Absolute Immature Grans 0.05 Thousand/uL      Absolute Lymphocytes 1.27 Thousands/µL      Absolute Monocytes 1.03 Thousand/µL      Eosinophils  Absolute 0.02 Thousand/µL      Basophils Absolute 0.05 Thousands/µL            CT abdomen pelvis wo contrast   Final Result by Marty Florentino MD (04/13 1401)      Diffuse fluid distention of the large bowel. Diagnostic considerations include infectious/inflammatory diarrheal state, malabsorption.      Nephrolithiasis.      Workstation performed: [id number]              Incidental Findings:   none    Test Results Pending at Discharge (will require follow up):   none     Outpatient Tests Requested:  none    Complications:  none    Reason for Admission: generalized weakness with N/V    HPI Per Admission:  Brittani Patino is a 40 y.o. female with a PMH of CKD stage 3, gastric bypass, recurrently hypokalemia, chronic anemia, migraine, and Vit B12 and D deficiency who presents with generalized fatigue with N/V for the past 2-3 days. Patient has laryngitis for the past 2-3 days, and she was on augmentin and medrol dose pack for the past few days. She reported not tolerating oral diet at home due to nausea and vomiting. She has regular bowel movement but notices dysuria this morning. She requires daily potassium injections at home. She denied fever, chills, CP, SOB, palpitations, abdominal pain, lower leg swelling.     In ED, her potassium was 2.5, sodium 131, Cr elevated to 2.16. Leukocytosis 13.74 likely due to recent steroid dosage for URI. Her anion gap 16 and beta-hydroxybutyrate 0.28 were elevated as well likely due to poor po intake. CXR unremarkable and CT abdomen pending. She was given 60mEq of IV potassium and started in IV fluids. Thus, patient is admitted for monitoring and treatment of electrolyte displacement and TICO.     During hospitalization, nephrology was consulted and her TICO resolved on 04/15 after IV fluids treatment and potassium require daily ~160 - 200mEq potassium repletion in order to increase it from ~2.5 >> 4.5. She has nausea at baseline and takes zofran as needed at home. Her vomiting stop  "after giving IV fluids and electrolyte repletion. She had dysuria on 04/14 and was started on IV rocephin and completed 3 day course on 04/16. On the day of discharge, patient is stable and reported feeling better and has appetite. Denied fever, chills, headache, CP, SOB, abdominal pain, dysuria.     Please see above list of diagnoses and related plan for additional information.     Condition at Discharge: stable    Discharge Day Visit / Exam:   Subjective:  patient examined besdie. She is not fatigue anymore. Also denied dysuria.     Vitals: Blood Pressure: 102/50 (04/17/25 0726)  Pulse: 74 (04/17/25 0726)  Temperature: 97.8 °F (36.6 °C) (04/17/25 0726)  Temp Source: Oral (04/17/25 0726)  Respirations: 16 (04/17/25 0726)  Height: 5' 2\" (157.5 cm) (04/13/25 1300)  Weight - Scale: 57.3 kg (126 lb 5.2 oz) (04/13/25 1300)  SpO2: 100 % (04/17/25 0726)  Physical Exam  Vitals and nursing note reviewed.   Constitutional:       General: She is not in acute distress.     Appearance: Normal appearance. She is well-developed. She is not ill-appearing.   HENT:      Head: Normocephalic and atraumatic.      Right Ear: External ear normal.      Left Ear: External ear normal.      Nose: Nose normal. No congestion.      Mouth/Throat:      Mouth: Mucous membranes are moist.      Pharynx: Oropharynx is clear. No oropharyngeal exudate or posterior oropharyngeal erythema.   Eyes:      General:         Right eye: No discharge.         Left eye: No discharge.      Extraocular Movements: Extraocular movements intact.      Conjunctiva/sclera: Conjunctivae normal.   Cardiovascular:      Rate and Rhythm: Normal rate and regular rhythm.      Pulses: Normal pulses.      Heart sounds: Normal heart sounds. No murmur heard.  Pulmonary:      Effort: Pulmonary effort is normal. No respiratory distress.      Breath sounds: Normal breath sounds. No wheezing.   Abdominal:      General: Abdomen is flat. Bowel sounds are normal. There is no distension. "      Palpations: Abdomen is soft.      Tenderness: There is no abdominal tenderness. There is no guarding or rebound.   Musculoskeletal:         General: No swelling. Normal range of motion.      Cervical back: Normal range of motion and neck supple.      Right lower leg: No edema.      Left lower leg: No edema.   Skin:     General: Skin is warm and dry.      Capillary Refill: Capillary refill takes less than 2 seconds.      Findings: No rash.   Neurological:      General: No focal deficit present.      Mental Status: She is alert and oriented to person, place, and time.   Psychiatric:         Mood and Affect: Mood normal.          Discussion with Family:  patient will call .     Discharge instructions/Information to patient and family:   See after visit summary for information provided to patient and family.      Provisions for Follow-Up Care:  See after visit summary for information related to follow-up care and any pertinent home health orders.      Mobility at time of Discharge:   Basic Mobility Inpatient Raw Score: 24  JH-HLM Goal: 8: Walk 250 feet or more  JH-HLM Achieved: 8: Walk 250 feet ot more  HLM Goal achieved. Continue to encourage appropriate mobility.     Disposition:   Home    Planned Readmission: none    Discharge Medications:  See after visit summary for reconciled discharge medications provided to patient and/or family.      Administrative Statements   Discharge Statement:  I have spent a total time of 30 minutes in caring for this patient on the day of the visit/encounter. >30 minutes of time was spent on: Impressions, Counseling / Coordination of care, Documenting in the medical record, Reviewing / ordering tests, medicine, procedures  , and Communicating with other healthcare professionals .    **Please Note: This note may have been constructed using a voice recognition system**

## 2025-04-17 NOTE — ASSESSMENT & PLAN NOTE
History of gastric bypass in 2014, w/ L chest port for infusion  Recurrent electrolyte displacement for many years.   On daily potassium infusion, monthly vitamin B12 injection and weekly vitamin D 47607Z oral supplements  - Avoid NSAIDs   - f/u GI outpatient

## 2025-04-17 NOTE — CASE MANAGEMENT
Case Management Discharge Planning Note    Patient name Brittani Patino  Location /422-01 MRN [id number]  : 1985 Date 2025       Current Admission Date: 2025  Current Admission Diagnosis:Weakness   Patient Active Problem List    Diagnosis Date Noted Date Diagnosed    UTI (urinary tract infection) 2025     Weakness 2025     Migraine 2025     Stage 3 chronic kidney disease (HCC) 2025     Magnesium deficiency 2024     Iron deficiency anemia due to dietary causes 2023     Nephrolithiasis 2023     Enterocolitis 2023     Intussusception (HCC) 2023     Leukocytosis 2023     Left ureteral calculus 2023     History of intussusception 10/20/2022 06/10/2023    Idiopathic acute pancreatitis without infection or necrosis 2022     Perianal abscess 2022     Episode of shaking      Psychogenic nonepileptic spells 2022     Hx of anxiety disorder 2022     Intractable vomiting 2022     Iron deficiency 2021     Other hemorrhoids      Open wound / skin tear of right chest wall 2021     Cellulitis of chest wall 2021     Acute pancreatitis 2021     Acute on chronic abdominal pain 2021     Abnormal LFTs 2021     Gas pain 2021     Middle ear effusion 2020     Normal anion gap metabolic acidosis 2020     Left upper quadrant abdominal pain 2019     Vertigo 2019     Stress fracture of right foot with routine healing 2019     Right ovarian cyst 2019     Chest pain 2019     Other intestinal malabsorption 10/05/2018     Hx of gastric bypass 10/02/2018     GERD (gastroesophageal reflux disease) 2018     Paresthesia of both lower extremities 08/15/2018     Paresthesias 08/15/2018     Fatigue 2018     Elevated CPK 2018     Vitamin B12 deficiency 2018     Cervical lymphadenopathy 2018     Acute on chronic anemia  07/10/2018     Vitamin D deficiency 07/10/2018     Hypocalcemia 07/09/2018     Hypophosphatemia 07/09/2018     History of gastric bypass 07/07/2018     Migraine without aura and without status migrainosus, not intractable 07/07/2018     Left-sided weakness 07/07/2018     Transaminitis 07/07/2018     B12 deficiency 01/17/2016     Essential hypertension 10/14/2014 06/10/2023    Chronic migraine without aura without status migrainosus, not intractable 05/29/2014       LOS (days): 4  Geometric Mean LOS (GMLOS) (days):   Days to GMLOS:     OBJECTIVE:  Risk of Unplanned Readmission Score: 19.67         Current admission status: Inpatient   Preferred Pharmacy:   [id number] [place] [facility] [address] [address]  Phone: [phone] Fax: [phone]    [id number]  [place] - [address] [facility]  [address] [facility] [address]  Phone: [phone] Fax: [phone]    [id number] [place] - [address]  [address]  [facility] [address]  Phone: [phone] Fax: [phone]    Primary Care Provider: Clarence Ingram MD    Primary Insurance: [employer]  Secondary Insurance:     DISCHARGE DETAILS:  Pt is being dc'd home on this date with OP follow up after dc.

## 2025-04-17 NOTE — ASSESSMENT & PLAN NOTE
Generalized weakness for past 2-3 days.   Patient was on Augmentin and medrol dose pack for laryngitis and didn't tolerate PO diet at home for last few days. Multiple nausea and vomiting episodes. Notices dysuria on 04/13  Fatigue likely 2/2 recent dehydration and URI or UTI  In ED, found to have electrolyte displacement, potassium 2.5 and sodium 131  04/15: hyponatremia resolved, still hypokalemic 2.7  4/16: Weakness resolved but she's anxious that her K did not improve  04/17: potassium back to 4.5 and weakness resolved    - DC patient to home on 04/17, will resume potassium infusion as scheduled before  -  f/u with GI and PCP outpatient

## 2025-04-17 NOTE — NURSING NOTE
Patient discharged ambulatory and accompanied by RN. AVS reviewed with the patient and no further questions at this time.

## 2025-04-17 NOTE — DISCHARGE INSTR - AVS FIRST PAGE
Continue potassium infusion as before after discharge.   Continue monthly vitamin B12 injection and weekly vitamin D 79035H oral supplements   Start taking pro-gregoria tablet once daily for iron deficiency anemia    Please follow up with your PCP and GI after discharge.

## 2025-04-17 NOTE — ASSESSMENT & PLAN NOTE
Lab Results   Component Value Date    EGFR 83 04/17/2025    EGFR 62 04/16/2025    EGFR 70 04/16/2025    CREATININE 0.87 04/17/2025    CREATININE 1.10 04/16/2025    CREATININE 1.00 04/16/2025   See TICO A/P

## 2025-04-17 NOTE — PLAN OF CARE
Problem: PAIN - ADULT  Goal: Verbalizes/displays adequate comfort level or baseline comfort level  Description: Interventions:- Encourage patient to monitor pain and request assistance- Assess pain using appropriate pain scale- Administer analgesics based on type and severity of pain and evaluate response- Implement non-pharmacological measures as appropriate and evaluate response- Consider cultural and social influences on pain and pain management- Notify physician/advanced practitioner if interventions unsuccessful or patient reports new pain  Outcome: Progressing     Problem: INFECTION - ADULT  Goal: Absence or prevention of progression during hospitalization  Description: INTERVENTIONS:- Assess and monitor for signs and symptoms of infection- Monitor lab/diagnostic results- Monitor all insertion sites, i.e. indwelling lines, tubes, and drains- Monitor endotracheal if appropriate and nasal secretions for changes in amount and color- [place] appropriate cooling/warming therapies per order- Administer medications as ordered- Instruct and encourage patient and family to use good hand hygiene technique- Identify and instruct in appropriate isolation precautions for identified infection/condition  Outcome: Progressing  Goal: Absence of fever/infection during neutropenic period  Description: INTERVENTIONS:- Monitor WBC  Outcome: Progressing     Problem: SAFETY ADULT  Goal: Patient will remain free of falls  Description: INTERVENTIONS:- Educate patient/family on patient safety including physical limitations- Instruct patient to call for assistance with activity - Consult OT/PT to assist with strengthening/mobility - Keep Call bell within reach- Keep bed low and locked with side rails adjusted as appropriate- Keep care items and personal belongings within reach- Initiate and maintain comfort rounds- Make Fall Risk Sign visible to staff- Offer Toileting every 2 Hours, in advance of need- Initiate/Maintain Bed/chair  alarm- Obtain necessary fall risk management equipment: non slip - Apply yellow socks and bracelet for high fall risk patients- Consider moving patient to room near nurses station  Outcome: Progressing  Goal: Maintain or return to baseline ADL function  Description: INTERVENTIONS:-  Assess patient's ability to carry out ADLs; assess patient's baseline for ADL function and identify physical deficits which impact ability to perform ADLs (bathing, care of mouth/teeth, toileting, grooming, dressing, etc.)- Assess/evaluate cause of self-care deficits - Assess range of motion- Assess patient's mobility; develop plan if impaired- Assess patient's need for assistive devices and provide as appropriate- Encourage maximum independence but intervene and supervise when necessary- Involve family in performance of ADLs- Assess for home care needs following discharge - Consider OT consult to assist with ADL evaluation and planning for discharge- Provide patient education as appropriate  Outcome: Progressing  Goal: Maintains/Returns to pre admission functional level  Description: INTERVENTIONS:- Perform AM-PAC 6 Click Basic Mobility/ Daily Activity assessment daily.- Set and communicate daily mobility goal to care team and patient/family/caregiver. - Collaborate with rehabilitation services on mobility goals if consulted- Perform Range of Motion 3 times a day.- Reposition patient every 2 hours.- Dangle patient 3 times a day- Stand patient 3 times a day- Ambulate patient 3 times a day- Out of bed to chair 3 times a day - Out of bed for meals 3 times a day- Out of bed for toileting- Record patient progress and toleration of activity level   Outcome: Progressing     Problem: DISCHARGE PLANNING  Goal: Discharge to home or other facility with appropriate resources  Description: INTERVENTIONS:- Identify barriers to discharge w/patient and caregiver- Arrange for needed discharge resources and transportation as appropriate- Identify  discharge learning needs (meds, wound care, etc.)- Arrange for interpretive services to assist at discharge as needed- Refer to Case Management Department for coordinating discharge planning if the patient needs post-hospital services based on physician/advanced practitioner order or complex needs related to functional status, cognitive ability, or social support system  Outcome: Progressing     Problem: Knowledge Deficit  Goal: Patient/family/caregiver demonstrates understanding of disease process, treatment plan, medications, and discharge instructions  Description: Complete learning assessment and assess knowledge base.Interventions:- Provide teaching at level of understanding- Provide teaching via preferred learning methods  Outcome: Progressing     Problem: Nutrition/Hydration-ADULT  Goal: Nutrient/Hydration intake appropriate for improving, restoring or maintaining nutritional needs  Description: Monitor and assess patient's nutrition/hydration status for malnutrition. Collaborate with interdisciplinary team and initiate plan and interventions as ordered.  Monitor patient's weight and dietary intake as ordered or per policy. Utilize nutrition screening tool and intervene as necessary. Determine patient's food preferences and provide high-protein, high-caloric foods as appropriate. INTERVENTIONS:- Monitor oral intake, urinary output, labs, and treatment plans- Assess nutrition and hydration status and recommend course of action- Evaluate amount of meals eaten- Assist patient with eating if necessary - Allow adequate time for meals- Recommend/ encourage appropriate diets, oral nutritional supplements, and vitamin/mineral supplements- Order, calculate, and assess calorie counts as needed- Recommend, monitor, and adjust tube feedings and TPN/PPN based on assessed needs- Assess need for intravenous fluids- Provide specific nutrition/hydration education as appropriate- Include patient/family/caregiver in decisions  related to nutrition  Outcome: Progressing

## 2025-04-18 NOTE — TELEPHONE ENCOUNTER
I called to schedule HFU for Pt. She stated she was out and that she would call back in about an hour to schedule her HFU appt.

## 2025-04-21 ENCOUNTER — TRANSITIONAL CARE MANAGEMENT (OUTPATIENT)
Dept: FAMILY MEDICINE CLINIC | Facility: CLINIC | Age: 40
End: 2025-04-21

## 2025-04-22 ENCOUNTER — OFFICE VISIT (OUTPATIENT)
Dept: FAMILY MEDICINE CLINIC | Facility: CLINIC | Age: 40
End: 2025-04-22
Payer: COMMERCIAL

## 2025-04-22 VITALS
WEIGHT: 138 LBS | HEART RATE: 115 BPM | SYSTOLIC BLOOD PRESSURE: 124 MMHG | BODY MASS INDEX: 25.4 KG/M2 | OXYGEN SATURATION: 96 % | HEIGHT: 62 IN | DIASTOLIC BLOOD PRESSURE: 78 MMHG | TEMPERATURE: 98.5 F | RESPIRATION RATE: 18 BRPM

## 2025-04-22 DIAGNOSIS — D50.8 IRON DEFICIENCY ANEMIA SECONDARY TO INADEQUATE DIETARY IRON INTAKE: ICD-10-CM

## 2025-04-22 DIAGNOSIS — Z76.89 ENCOUNTER FOR SUPPORT AND COORDINATION OF TRANSITION OF CARE: Primary | ICD-10-CM

## 2025-04-22 DIAGNOSIS — E87.6 HYPOKALEMIA: ICD-10-CM

## 2025-04-22 PROCEDURE — 99495 TRANSJ CARE MGMT MOD F2F 14D: CPT

## 2025-04-22 RX ORDER — SPIRONOLACTONE 25 MG/1
12.5 TABLET ORAL DAILY
Qty: 30 TABLET | Refills: 1 | Status: CANCELLED | OUTPATIENT
Start: 2025-04-22

## 2025-04-22 NOTE — PROGRESS NOTES
Name: Brittani Patino      : 1985      MRN: [id number]  Encounter Provider: Bob Anderson MD  Encounter Date: 2025   Encounter department: [facility] [place]  :  Patient doing well currently, is recovering from respiratory illness well and shows no signs of infection in the oropharynx at this time, expectant management there.  Her hypokalemia is being followed on a weekly basis by Dr. Ricketts, will continue with that plan.  Will order a CBC prior to her annual checkup in 1 month due to her anemia for which she is on iron supplementation, iron studies ordered for about 5-6 months from now.  Will reach out to nephrology regarding whether she needs to be on spironolactone for hypokalemia.  Supervising physician saw patient at clinic today alongside me.  Assessment & Plan  Encounter for support and coordination of transition of care         Hypokalemia         Iron deficiency anemia secondary to inadequate dietary iron intake    Orders:    CBC and differential; Future    Iron Panel (Includes Ferritin, Iron Sat%, Iron, and TIBC); Future           History of Present Illness   HPI  CC: Virtual TCM  Admitted  - 2025 w/hypokalemia + hyponatremia, acute on chronic anemia, TICO, AGMA, weakness, UTI. Pt had laryngitis and was given augmentin + medrol dose lukas which was not effective, had nausea & vomiting at home, noticed dysuria day of admission + fatigue/weakness. In ED had K+ 2.5 Na+ 131. Hx gastric bypass but tolerated it well, and 5-10 years later started having potassium dumping out of the urine rather than the stool which only responded to IV potassium infusions, so was started on nightly K+ infusions in the past.  Given diflucan + 3d rocephin, electrolytes repleted, TICO resolved back to CKD stage II upon discharge with rehydration, D/c'd with 80mEq K+ infusions nightly were restarted.  Had acute on chronic anemia, Hgb 14->9 roughly from  - , remained steady  afterward, cause unknown, was started on proferrin. She was dehydrated and had multiple lab draws so there may have been significant hemoconcentration plus a phlebotomy iatrogenic effect  Today states she was diagnosed with strep throat prior to her hospitalization, was started on Augmentin but did not have a chance to start the dexamethasone prior to going into the hospital.  Due to her sore throat and diarrhea that was fairly significant and nonbloody once she started taking the Augmentin, she was quite dehydrated upon coming to the hospital.  Now that she is discharged, she feels well.  She relayed to me how she has had  extensive workup regarding her potassium, including 48-hour urine studies, stool studies, no renal bile Apsey but she has had imaging of the neck the abdomen and the pelvis that showed no masses, no masses on CT head, has not been found to have significant stool wasting of potassium.  She was on 12.5 mg of spironolactone that was started from the looks of it in August 2024 by nephrology during one of her hospitalizations, though she was uncertain whether it was meant to be continued outside of the hospitalization and I could not find documentation saying that it should be discontinued.  She is willing to continue the medication if it can help preserve potassium in her body, and she is still taking IV nightly 80 mill equivalents potassium.  Other than that she has no complaints and feels fairly well.  We discussed her low hemoglobin during her hospitalization, and she relate a history of heavy menstrual bleeding that was resistant to uterine ablations in 20 16-20 17, for which she underwent hysterectomy and bilateral salpingo-oophorectomy and has not had significant hormonal symptoms and has had no recurrence of menstrual bleeding.  Reviewed imaging results from 2025/2024 and recent labs.    [facility] Call (since 4/8/2025)       Date and time call was made  4/21/2025 12:21 PM    Patient was  "hospitialized at  [facility]    Date of Admission  04/13/25    Date of discharge  04/17/25    Diagnosis  weakness    Disposition  Home    Were the patients medications reviewed and updated  No          TCM Call (since 4/8/2025)       I have advised the patient to call PCP with any new or worsening symptoms  curly plummer cma              Review of Systems   Constitutional:  Negative for chills and fever.   HENT:  Positive for voice change. Negative for ear pain and sore throat.    Eyes:  Negative for pain and visual disturbance.   Respiratory:  Negative for cough and shortness of breath.    Cardiovascular:  Negative for chest pain and palpitations.   Gastrointestinal:  Negative for abdominal pain, blood in stool, constipation, diarrhea, nausea and vomiting.   Genitourinary:  Negative for dysuria and hematuria.   Musculoskeletal:  Negative for arthralgias and back pain.   Skin:  Negative for color change and rash.   Neurological:  Negative for dizziness, seizures, syncope and light-headedness.   Psychiatric/Behavioral:  Negative for dysphoric mood. The patient is not nervous/anxious.    All other systems reviewed and are negative.      Objective   /78   Pulse (!) 115   Temp 98.5 °F (36.9 °C)   Resp 18   Ht 5' 2\" (1.575 m)   Wt 62.6 kg (138 lb)   LMP  (LMP Unknown)   SpO2 96%   BMI 25.24 kg/m²      Physical Exam  Vitals and nursing note reviewed.   Constitutional:       General: She is not in acute distress.     Appearance: She is well-developed.   HENT:      Head: Normocephalic and atraumatic.      Right Ear: Tympanic membrane, ear canal and external ear normal. There is no impacted cerumen.      Left Ear: Tympanic membrane, ear canal and external ear normal. There is no impacted cerumen.      Nose: No rhinorrhea.      Mouth/Throat:      Mouth: Mucous membranes are moist.      Pharynx: Oropharynx is clear. No oropharyngeal exudate or posterior oropharyngeal erythema.      Comments: Mildly hoarse " voice  Eyes:      General: No scleral icterus.        Right eye: No discharge.         Left eye: No discharge.      Conjunctiva/sclera: Conjunctivae normal.   Cardiovascular:      Rate and Rhythm: Normal rate and regular rhythm.      Pulses: Normal pulses.      Heart sounds: Normal heart sounds. No murmur heard.     No friction rub. No gallop.      Comments: Normal rate on re-examination  Pulmonary:      Effort: Pulmonary effort is normal. No respiratory distress.      Breath sounds: Normal breath sounds. No stridor. No wheezing, rhonchi or rales.   Abdominal:      General: Abdomen is flat. Bowel sounds are normal. There is no distension.      Palpations: Abdomen is soft. There is no mass.      Tenderness: There is no abdominal tenderness. There is no guarding or rebound.   Musculoskeletal:         General: No swelling.      Cervical back: Neck supple.   Lymphadenopathy:      Cervical: No cervical adenopathy.   Skin:     General: Skin is warm and dry.      Capillary Refill: Capillary refill takes less than 2 seconds.      Coloration: Skin is not jaundiced or pale.   Neurological:      Mental Status: She is alert.      Comments: No facial droop, slurred speech or gross focal deficits noted   Psychiatric:         Mood and Affect: Mood normal.         Behavior: Behavior normal.

## 2025-04-28 DIAGNOSIS — E87.6 HYPOKALEMIA: Primary | ICD-10-CM

## 2025-05-07 ENCOUNTER — TELEPHONE (OUTPATIENT)
Dept: NEPHROLOGY | Facility: CLINIC | Age: 40
End: 2025-05-07

## 2025-05-07 DIAGNOSIS — D50.8 IRON DEFICIENCY ANEMIA DUE TO DIETARY CAUSES: ICD-10-CM

## 2025-05-07 RX ORDER — FERROUS SULFATE 324(65)MG
324 TABLET, DELAYED RELEASE (ENTERIC COATED) ORAL
Qty: 30 TABLET | Refills: 1 | Status: SHIPPED | OUTPATIENT
Start: 2025-05-07

## 2025-05-07 RX ORDER — IRON HEME POLYPEPTIDE 12 MG
12 TABLET ORAL DAILY
Qty: 30 TABLET | Refills: 0 | OUTPATIENT
Start: 2025-05-07

## 2025-05-07 NOTE — TELEPHONE ENCOUNTER
I called and left a VM for Brittani regarding completing blood/urine labs prior to upcoming appt on 05/14/2025

## 2025-05-14 ENCOUNTER — PATIENT MESSAGE (OUTPATIENT)
Dept: SURGERY | Facility: CLINIC | Age: 40
End: 2025-05-14

## 2025-05-15 ENCOUNTER — OFFICE VISIT (OUTPATIENT)
Dept: SURGERY | Facility: CLINIC | Age: 40
End: 2025-05-15
Payer: COMMERCIAL

## 2025-05-15 VITALS
OXYGEN SATURATION: 97 % | BODY MASS INDEX: 23.92 KG/M2 | DIASTOLIC BLOOD PRESSURE: 70 MMHG | TEMPERATURE: 98.2 F | HEART RATE: 93 BPM | SYSTOLIC BLOOD PRESSURE: 110 MMHG | HEIGHT: 62 IN | WEIGHT: 130 LBS

## 2025-05-15 DIAGNOSIS — S21.102A OPEN WOUND OF LEFT CHEST WALL, INITIAL ENCOUNTER: Primary | ICD-10-CM

## 2025-05-15 PROCEDURE — 99214 OFFICE O/P EST MOD 30 MIN: CPT | Performed by: SURGERY

## 2025-05-19 ENCOUNTER — APPOINTMENT (OUTPATIENT)
Dept: LAB | Facility: CLINIC | Age: 40
End: 2025-05-19
Payer: COMMERCIAL

## 2025-05-19 DIAGNOSIS — E87.6 HYPOKALEMIA: ICD-10-CM

## 2025-05-19 LAB
ANION GAP SERPL CALCULATED.3IONS-SCNC: 10 MMOL/L (ref 4–13)
BUN SERPL-MCNC: 11 MG/DL (ref 5–25)
CALCIUM SERPL-MCNC: 8.5 MG/DL (ref 8.4–10.2)
CHLORIDE SERPL-SCNC: 111 MMOL/L (ref 96–108)
CO2 SERPL-SCNC: 18 MMOL/L (ref 21–32)
CREAT SERPL-MCNC: 1.08 MG/DL (ref 0.6–1.3)
GFR SERPL CREATININE-BSD FRML MDRD: 63 ML/MIN/1.73SQ M
GLUCOSE P FAST SERPL-MCNC: 94 MG/DL (ref 65–99)
POTASSIUM SERPL-SCNC: 4.2 MMOL/L (ref 3.5–5.3)
SODIUM SERPL-SCNC: 139 MMOL/L (ref 135–147)

## 2025-05-19 PROCEDURE — 36415 COLL VENOUS BLD VENIPUNCTURE: CPT

## 2025-05-19 PROCEDURE — 80048 BASIC METABOLIC PNL TOTAL CA: CPT

## 2025-05-20 ENCOUNTER — RESULTS FOLLOW-UP (OUTPATIENT)
Dept: NEPHROLOGY | Facility: CLINIC | Age: 40
End: 2025-05-20

## 2025-05-20 PROBLEM — S21.102A OPEN WOUND OF LEFT CHEST WALL: Status: ACTIVE | Noted: 2025-05-20

## 2025-05-20 RX ORDER — SODIUM CHLORIDE, SODIUM LACTATE, POTASSIUM CHLORIDE, CALCIUM CHLORIDE 600; 310; 30; 20 MG/100ML; MG/100ML; MG/100ML; MG/100ML
125 INJECTION, SOLUTION INTRAVENOUS CONTINUOUS
Status: CANCELLED | OUTPATIENT
Start: 2025-05-23

## 2025-05-20 RX ORDER — CEFAZOLIN SODIUM 2 G/50ML
2000 SOLUTION INTRAVENOUS ONCE
Status: CANCELLED | OUTPATIENT
Start: 2025-05-23 | End: 2025-05-20

## 2025-05-20 RX ORDER — HEPARIN SODIUM 5000 [USP'U]/ML
5000 INJECTION, SOLUTION INTRAVENOUS; SUBCUTANEOUS ONCE
Status: CANCELLED | OUTPATIENT
Start: 2025-05-23 | End: 2025-05-20

## 2025-05-20 NOTE — PROGRESS NOTES
Name: Brittani Patino      : 1985      MRN: [id number]  Encounter Provider: Ryan Singh DO  Encounter Date: 5/15/2025   Encounter department: [facility]  :  Assessment & Plan  Open wound of left chest wall at port site, initial encounter  Patient with history of daily electrolyte infusions status post gastric bypass, with longstanding history of port placement.  The initial port was in the right for many years and then subsequent breakdown of the port site skin with subsequent removal of port on the right and placement of left-sided port.  Patient has had her left port for approximately 1 year and unfortunately after each access of the port the hole is no longer closing.  Unfortunately with this chronic open hole overlying the port there is risk of infection.  Patient will require removal of left sided port and placement of new port on the right side most likely.  Will discuss with IR if there are any other possibilities, unfortunately though I think any longstanding permacath or PICC line will just open the door to infection.  The procedure itself including all associated risks including bleeding, infection, injury to the lung, hematoma, port dysfunction, breakdown of port site chest wall skin, were all discussed at great length.  The patient verbalized understands risk is willing to proceed, consent was signed.  No other preoperative workup required at this time.         Notes:    Recent PCP note dated 2020 was personally viewed by me.    Blood work:    Recent blood work to include CBC and BMP both dated 2025 was personally reviewed by me.    History of Present Illness   HPI  Brittani Patino is a 40 y.o. female who presents today to discuss her left port.  Patient states that unfortunately each time she is accessing her left port site the hole is not closing.  This was a similar problem she had with her right port.  But fortunately on the right side this lasted for  "approximately 6 years before she had any issues.  Now she has had this left-sided port for approximate 1 year and is already having problems.  I do remember when putting this port into the skin was rather thin.  The port is working appropriately.  She says that she does not feel that she has any type of infectious problem at this time.  No significant pain.  No fevers or chills.    History obtained from: patient    Review of Systems    Review of systems completed, all negative except as noted HPI.    Past Medical History   Past Medical History:   Diagnosis Date    Anemia     Anxiety     C. difficile colitis     COVID-19     2022- pt denies long covid    DUB (dysfunctional uterine bleeding)     H. pylori infection     Head injury July 3, 2016    Hypertension     Hypokalemia     Kidney stone 2023    Left lower quadrant abdominal pain 2020    Migraine     WAGNER (obstructive sleep apnea) 2014    Pancreatitis     Psychiatric disorder     Anxiety    Rectal bleeding     Renal disorder     Rhabdomyolysis     Sleep apnea     resolved with bariatric surgery    Thrombosed external hemorrhoid      Past Surgical History:   Procedure Laterality Date    ABDOMINAL SURGERY      APPENDECTOMY      BARIATRIC SURGERY  Oct 1, 2017     SECTION      CHOLECYSTECTOMY      COLONOSCOPY      COSMETIC SURGERY      \"tummy tuck\"    FL GUIDED CENTRAL VENOUS ACCESS DEVICE INSERTION  2018    FL GUIDED CENTRAL VENOUS ACCESS DEVICE INSERTION  2024    FL RETROGRADE PYELOGRAM  2023    GASTRIC BYPASS      HYSTERECTOMY      LAPAROSCOPY      AL ANRCT XM SURG REQ ANES GENERAL SPI/EDRL DX N/A 2021    Procedure: ANAL EXAM UNDER ANESTHESIA; HEMORRHOIDECTOMY;  Surgeon: Dona Jeffries DO;  Location:  MAIN OR;  Service: General    AL CYSTO/URETERO W/LITHOTRIPSY &INDWELL STENT INSRT Left 2023    Procedure: CYSTOSCOPY URETEROSCOPY WITH RETROGRADE PYELOGRAM, BASKET STONE EXTRACTION AND " INSERTION STENT URETERAL;  Surgeon: Geovanny Rosales MD;  Location: [facility];  Service: Urology    MN EXC THROMBOSED HEMORRHOID XTRNL N/A 07/08/2022    Procedure: EXCISION OF THROMBOSED EXTERNAL HEMORRHOID, Excision of perianal skin tag, dranage of perianal abscess, anal fistulatomy;  Surgeon: Dona Jeffries DO;  Location: [facility];  Service: General    MN INSJ TUNNELED CTR VAD W/SUBQ PORT AGE 5 YR/> Left 02/09/2024    Procedure: INSERTION VENOUS PORT (PORT-A-CATH);  Surgeon: Ryan Singh DO;  Location: [facility];  Service: General    MN RMVL AMIRAH CTR VAD W/SUBQ PORT/ CTR/PRPH INSJ Right 02/09/2024    Procedure: REMOVAL VENOUS PORT (PORT-A-CATH);  Surgeon: Ryan Singh DO;  Location: [facility];  Service: General    TUBAL LIGATION      TUNNELED VENOUS PORT PLACEMENT N/A 12/21/2018    Procedure: INSERTION VENOUS PORT (PORT-A-CATH);  Surgeon: Ryan Singh DO;  [facility]: [facility];  Service: General     Family History   Problem Relation Age of Onset    No Known Problems Mother     Hypertension Father     Diabetes Father     Urolithiasis Father     Prostate cancer Father     Diabetes Maternal Grandfather       reports that she has never smoked. She has never been exposed to tobacco smoke. She has never used smokeless tobacco. She reports that she does not currently use alcohol. She reports that she does not currently use drugs after having used the following drugs: Marijuana.  Current Outpatient Medications   Medication Instructions    amitriptyline (ELAVIL) 75 mg, Oral, Daily at bedtime    cyanocobalamin 1,000 mcg, Intramuscular, Every 30 days, Next dose due 5/1/2023    EPINEPHrine (EPIPEN) 0.3 mg    ergocalciferol (ERGOCALCIFEROL) 50,000 Units, Oral, 2 times weekly, Mondays and Thursdays    escitalopram (LEXAPRO) 20 mg, Daily at bedtime    ferrous sulfate 324 mg, Oral, Daily before breakfast    gabapentin (NEURONTIN) 100 mg, As needed    Galcanezumab-gnlm 120 mg, Subcutaneous, Every 30 days     "meclizine (ANTIVERT) 12.5 mg, Oral, 3 times daily PRN, Do not take daily    ondansetron (ZOFRAN-ODT) 4 mg, Every 6 hours PRN    potassium chloride 20 MEQ/50ML 80 mEq, Intravenous, Daily, 80 meq in 1 litre bag to be infused daily over 8 hours. Continue as ordered prior to admission    Proferrin ES 12 mg, Oral, Daily    spironolactone (ALDACTONE) 12.5 mg, Oral, Daily    SUMAtriptan Succinate 6 mg, Subcutaneous, As needed, May repeat once in 1 hour if needed. Max 12 mg/day, Max 3 days per week    Syringe/Needle, Disp, (SYRINGE 3CC/25GX5/8\") 25G X 5/8\" 3 ML MISC Use once monthly for B12 injection.   Allergies[1]   Medications Ordered Prior to Encounter[2]   Social History     Tobacco Use    Smoking status: Never     Passive exposure: Never    Smokeless tobacco: Never   Vaping Use    Vaping status: Never Used   Substance and Sexual Activity    Alcohol use: Not Currently    Drug use: Not Currently     Types: Marijuana     Comment: Medical marijuana    Sexual activity: Yes     Partners: Male     Birth control/protection: Surgical        Objective   /70 (BP Location: Left arm, Patient Position: Sitting, Cuff Size: Standard)   Pulse 93   Temp 98.2 °F (36.8 °C) (Temporal)   Ht 5' 2\" (1.575 m)   Wt 59 kg (130 lb)   LMP  (LMP Unknown)   SpO2 97%   BMI 23.78 kg/m²      Physical Exam  Vitals reviewed.   Constitutional:       General: She is not in acute distress.     Appearance: Normal appearance. She is not ill-appearing, toxic-appearing or diaphoretic.   HENT:      Head: Normocephalic and atraumatic.      Right Ear: External ear normal.      Left Ear: External ear normal.     Eyes:      General: No scleral icterus.        Right eye: No discharge.         Left eye: No discharge.       Cardiovascular:      Rate and Rhythm: Normal rate and regular rhythm.      Heart sounds: Normal heart sounds.   Pulmonary:      Effort: Pulmonary effort is normal. No respiratory distress.      Breath sounds: Normal breath sounds. No " stridor.   Chest:      Comments: Right upper chest with evidence of previous port with well-healed scar.    Left chest with palpable port.  The port is currently accessed.  I did review previous pictures which did show evidence of nonhealing port site puncture.  Abdominal:      Palpations: Abdomen is soft.     Musculoskeletal:         General: Normal range of motion.      Cervical back: Normal range of motion.     Skin:     General: Skin is warm.      Coloration: Skin is not jaundiced or pale.      Findings: No bruising or erythema.     Neurological:      General: No focal deficit present.      Mental Status: She is alert and oriented to person, place, and time.      Cranial Nerves: No cranial nerve deficit.     Psychiatric:         Mood and Affect: Mood normal.         Behavior: Behavior normal.         Thought Content: Thought content normal.         Judgment: Judgment normal.                [1]   Allergies  Allergen Reactions    Other Anaphylaxis     Jelly fish    Nsaids Other (See Comments)     Other reaction(s): Other (Please comment)  Should not take s/p bariatric surgery  Other reaction(s): Other (See Comments)  Should not take as per prior records  Should not take s/p bariatric surgery  Has hx of gastric bypass      Augmentin [Amoxicillin-Pot Clavulanate] Diarrhea    Prednisone      Nausea, vomiting, diarrhea   [2]   Current Outpatient Medications on File Prior to Visit   Medication Sig Dispense Refill    amitriptyline (ELAVIL) 50 mg tablet Take 1.5 tablets (75 mg total) by mouth daily at bedtime 135 tablet 1    cyanocobalamin 1,000 mcg/mL Inject 1 mL (1,000 mcg total) into a muscle every 30 (thirty) days Next dose due 5/1/2023      EPINEPHrine (EPIPEN) 0.3 mg/0.3 mL SOAJ Inject 0.3 mg into a muscle      ergocalciferol (ERGOCALCIFEROL) 1.25 MG (69460 UT) capsule Take 1 capsule (50,000 Units total) by mouth 2 (two) times a week Mondays and Thursdays 24 capsule 1    escitalopram (LEXAPRO) 20 mg tablet 20 mg  "daily at bedtime      ferrous sulfate 324 (65 Fe) mg Take 1 tablet (324 mg total) by mouth daily before breakfast 30 tablet 1    gabapentin (NEURONTIN) 100 mg capsule Take 100 mg by mouth as needed      Galcanezumab-gnlm 120 MG/ML SOAJ Inject 120 mg under the skin every 30 (thirty) days 1 mL 11    Iron Heme Polypeptide (Proferrin ES) 12 MG TABS Take 1 tablet (12 mg total) by mouth in the morning 30 tablet 0    meclizine (ANTIVERT) 12.5 MG tablet Take 1 tablet (12.5 mg total) by mouth 3 (three) times a day as needed for dizziness Do not take daily 30 tablet 1    ondansetron (ZOFRAN-ODT) 4 mg disintegrating tablet Take 4 mg by mouth every 6 (six) hours as needed for nausea or vomiting      potassium chloride 20 MEQ/50ML Inject 200 mL (80 mEq total) into a catheter in a vein daily 80 meq in 1 litre bag to be infused daily over 8 hours. Continue as ordered prior to admission 5600 mL 11    spironolactone (ALDACTONE) 25 mg tablet Take 0.5 tablets (12.5 mg total) by mouth daily      SUMAtriptan Succinate 6 MG/0.5ML SOAJ Inject 0.5 mL (6 mg total) under the skin as needed (migraine) May repeat once in 1 hour if needed. Max 12 mg/day, Max 3 days per week 3 mL 1    Syringe/Needle, Disp, (SYRINGE 3CC/25GX5/8\") 25G X 5/8\" 3 ML MISC Use once monthly for B12 injection. 1 each 11     No current facility-administered medications on file prior to visit.     "

## 2025-05-20 NOTE — H&P
Name: Brittani Patino      : 1985      MRN: [id number]  Encounter Provider: Ryan Singh DO  Encounter Date: 5/15/2025   Encounter department: [facility]  :  Assessment & Plan  Open wound of left chest wall at port site, initial encounter  Patient with history of daily electrolyte infusions status post gastric bypass, with longstanding history of port placement.  The initial port was in the right for many years and then subsequent breakdown of the port site skin with subsequent removal of port on the right and placement of left-sided port.  Patient has had her left port for approximately 1 year and unfortunately after each access of the port the hole is no longer closing.  Unfortunately with this chronic open hole overlying the port there is risk of infection.  Patient will require removal of left sided port and placement of new port on the right side most likely.  Will discuss with IR if there are any other possibilities, unfortunately though I think any longstanding permacath or PICC line will just open the door to infection.  The procedure itself including all associated risks including bleeding, infection, injury to the lung, hematoma, port dysfunction, breakdown of port site chest wall skin, were all discussed at great length.  The patient verbalized understands risk is willing to proceed, consent was signed.  No other preoperative workup required at this time.         Notes:    Recent PCP note dated 2020 was personally viewed by me.    Blood work:    Recent blood work to include CBC and BMP both dated 2025 was personally reviewed by me.    History of Present Illness   HPI  Brittani Patino is a 40 y.o. female who presents today to discuss her left port.  Patient states that unfortunately each time she is accessing her left port site the hole is not closing.  This was a similar problem she had with her right port.  But fortunately on the right side this lasted for  "approximately 6 years before she had any issues.  Now she has had this left-sided port for approximate 1 year and is already having problems.  I do remember when putting this port into the skin was rather thin.  The port is working appropriately.  She says that she does not feel that she has any type of infectious problem at this time.  No significant pain.  No fevers or chills.    History obtained from: patient    Review of Systems    Review of systems completed, all negative except as noted HPI.    Past Medical History  Past Medical History:   Diagnosis Date    Anemia     Anxiety     C. difficile colitis     COVID-19     2022- pt denies long covid    DUB (dysfunctional uterine bleeding)     H. pylori infection     Head injury July 3, 2016    Hypertension     Hypokalemia     Kidney stone 2023    Left lower quadrant abdominal pain 2020    Migraine     WAGNER (obstructive sleep apnea) 2014    Pancreatitis     Psychiatric disorder     Anxiety    Rectal bleeding     Renal disorder     Rhabdomyolysis     Sleep apnea     resolved with bariatric surgery    Thrombosed external hemorrhoid      Past Surgical History:   Procedure Laterality Date    ABDOMINAL SURGERY      APPENDECTOMY      BARIATRIC SURGERY  Oct 1, 2017     SECTION      CHOLECYSTECTOMY      COLONOSCOPY      COSMETIC SURGERY      \"tummy tuck\"    FL GUIDED CENTRAL VENOUS ACCESS DEVICE INSERTION  2018    FL GUIDED CENTRAL VENOUS ACCESS DEVICE INSERTION  2024    FL RETROGRADE PYELOGRAM  2023    GASTRIC BYPASS      HYSTERECTOMY      LAPAROSCOPY      MT ANRCT XM SURG REQ ANES GENERAL SPI/EDRL DX N/A 2021    Procedure: ANAL EXAM UNDER ANESTHESIA; HEMORRHOIDECTOMY;  Surgeon: Dona Jeffries DO;  Location:  MAIN OR;  Service: General    MT CYSTO/URETERO W/LITHOTRIPSY &INDWELL STENT INSRT Left 2023    Procedure: CYSTOSCOPY URETEROSCOPY WITH RETROGRADE PYELOGRAM, BASKET STONE EXTRACTION AND " INSERTION STENT URETERAL;  Surgeon: Geovanny Rosales MD;  [facility];  Service: Urology    MA EXC THROMBOSED HEMORRHOID XTRNL N/A 07/08/2022    Procedure: EXCISION OF THROMBOSED EXTERNAL HEMORRHOID, Excision of perianal skin tag, dranage of perianal abscess, anal fistulatomy;  Surgeon: Dona Jeffries DO;  [facility]: [facility];  Service: General    MA INSJ TUNNELED CTR VAD W/SUBQ PORT AGE 4 YR/> Left 02/09/2024    Procedure: INSERTION VENOUS PORT (PORT-A-CATH);  Surgeon: Ryan Singh DO;  [facility];  Service: General    MA RMVL AMIRAH CTR VAD W/SUBQ PORT/ CTR/PRPH INSJ Right 02/09/2024    Procedure: REMOVAL VENOUS PORT (PORT-A-CATH);  Surgeon: Ryan Singh DO;  [facility];  Service: General    TUBAL LIGATION      TUNNELED VENOUS PORT PLACEMENT N/A 12/21/2018    Procedure: INSERTION VENOUS PORT (PORT-A-CATH);  Surgeon: Ryan Singh DO;  [facility];  Service: General     Family History   Problem Relation Age of Onset    No Known Problems Mother     Hypertension Father     Diabetes Father     Urolithiasis Father     Prostate cancer Father     Diabetes Maternal Grandfather       reports that she has never smoked. She has never been exposed to tobacco smoke. She has never used smokeless tobacco. She reports that she does not currently use alcohol. She reports that she does not currently use drugs after having used the following drugs: Marijuana.  Current Outpatient Medications   Medication Instructions    amitriptyline (ELAVIL) 75 mg, Oral, Daily at bedtime    cyanocobalamin 1,000 mcg, Intramuscular, Every 30 days, Next dose due 5/1/2023    EPINEPHrine (EPIPEN) 0.3 mg    ergocalciferol (ERGOCALCIFEROL) 50,000 Units, Oral, 2 times weekly, Mondays and Thursdays    escitalopram (LEXAPRO) 20 mg, Daily at bedtime    ferrous sulfate 324 mg, Oral, Daily before breakfast    gabapentin (NEURONTIN) 100 mg, As needed    Galcanezumab-gnlm 120 mg, Subcutaneous, Every 30 days     "meclizine (ANTIVERT) 12.5 mg, Oral, 3 times daily PRN, Do not take daily    ondansetron (ZOFRAN-ODT) 4 mg, Every 6 hours PRN    potassium chloride 20 MEQ/50ML 80 mEq, Intravenous, Daily, 80 meq in 1 litre bag to be infused daily over 8 hours. Continue as ordered prior to admission    Proferrin ES 12 mg, Oral, Daily    spironolactone (ALDACTONE) 12.5 mg, Oral, Daily    SUMAtriptan Succinate 6 mg, Subcutaneous, As needed, May repeat once in 1 hour if needed. Max 12 mg/day, Max 3 days per week    Syringe/Needle, Disp, (SYRINGE 3CC/25GX5/8\") 25G X 5/8\" 3 ML MISC Use once monthly for B12 injection.   Allergies[1]   Medications Ordered Prior to Encounter[2]   Social History     Tobacco Use    Smoking status: Never     Passive exposure: Never    Smokeless tobacco: Never   Vaping Use    Vaping status: Never Used   Substance and Sexual Activity    Alcohol use: Not Currently    Drug use: Not Currently     Types: Marijuana     Comment: Medical marijuana    Sexual activity: Yes     Partners: Male     Birth control/protection: Surgical        Objective   /70 (BP Location: Left arm, Patient Position: Sitting, Cuff Size: Standard)   Pulse 93   Temp 98.2 °F (36.8 °C) (Temporal)   Ht 5' 2\" (1.575 m)   Wt 59 kg (130 lb)   LMP  (LMP Unknown)   SpO2 97%   BMI 23.78 kg/m²      Physical Exam  Vitals reviewed.   Constitutional:       General: She is not in acute distress.     Appearance: Normal appearance. She is not ill-appearing, toxic-appearing or diaphoretic.   HENT:      Head: Normocephalic and atraumatic.      Right Ear: External ear normal.      Left Ear: External ear normal.     Eyes:      General: No scleral icterus.        Right eye: No discharge.         Left eye: No discharge.       Cardiovascular:      Rate and Rhythm: Normal rate and regular rhythm.      Heart sounds: Normal heart sounds.   Pulmonary:      Effort: Pulmonary effort is normal. No respiratory distress.      Breath sounds: Normal breath sounds. No " stridor.   Chest:      Comments: Right upper chest with evidence of previous port with well-healed scar.    Left chest with palpable port.  The port is currently accessed.  I did review previous pictures which did show evidence of nonhealing port site puncture.  Abdominal:      Palpations: Abdomen is soft.     Musculoskeletal:         General: Normal range of motion.      Cervical back: Normal range of motion.     Skin:     General: Skin is warm.      Coloration: Skin is not jaundiced or pale.      Findings: No bruising or erythema.     Neurological:      General: No focal deficit present.      Mental Status: She is alert and oriented to person, place, and time.      Cranial Nerves: No cranial nerve deficit.     Psychiatric:         Mood and Affect: Mood normal.         Behavior: Behavior normal.         Thought Content: Thought content normal.         Judgment: Judgment normal.                  [1]   Allergies  Allergen Reactions    Other Anaphylaxis     Jelly fish    Nsaids Other (See Comments)     Other reaction(s): Other (Please comment)  Should not take s/p bariatric surgery  Other reaction(s): Other (See Comments)  Should not take as per prior records  Should not take s/p bariatric surgery  Has hx of gastric bypass      Augmentin [Amoxicillin-Pot Clavulanate] Diarrhea    Prednisone      Nausea, vomiting, diarrhea   [2]   Current Outpatient Medications on File Prior to Visit   Medication Sig Dispense Refill    amitriptyline (ELAVIL) 50 mg tablet Take 1.5 tablets (75 mg total) by mouth daily at bedtime 135 tablet 1    cyanocobalamin 1,000 mcg/mL Inject 1 mL (1,000 mcg total) into a muscle every 30 (thirty) days Next dose due 5/1/2023      EPINEPHrine (EPIPEN) 0.3 mg/0.3 mL SOAJ Inject 0.3 mg into a muscle      ergocalciferol (ERGOCALCIFEROL) 1.25 MG (40880 UT) capsule Take 1 capsule (50,000 Units total) by mouth 2 (two) times a week Mondays and Thursdays 24 capsule 1    escitalopram (LEXAPRO) 20 mg tablet 20 mg  "daily at bedtime      ferrous sulfate 324 (65 Fe) mg Take 1 tablet (324 mg total) by mouth daily before breakfast 30 tablet 1    gabapentin (NEURONTIN) 100 mg capsule Take 100 mg by mouth as needed      Galcanezumab-gnlm 120 MG/ML SOAJ Inject 120 mg under the skin every 30 (thirty) days 1 mL 11    Iron Heme Polypeptide (Proferrin ES) 12 MG TABS Take 1 tablet (12 mg total) by mouth in the morning 30 tablet 0    meclizine (ANTIVERT) 12.5 MG tablet Take 1 tablet (12.5 mg total) by mouth 3 (three) times a day as needed for dizziness Do not take daily 30 tablet 1    ondansetron (ZOFRAN-ODT) 4 mg disintegrating tablet Take 4 mg by mouth every 6 (six) hours as needed for nausea or vomiting      potassium chloride 20 MEQ/50ML Inject 200 mL (80 mEq total) into a catheter in a vein daily 80 meq in 1 litre bag to be infused daily over 8 hours. Continue as ordered prior to admission 5600 mL 11    spironolactone (ALDACTONE) 25 mg tablet Take 0.5 tablets (12.5 mg total) by mouth daily      SUMAtriptan Succinate 6 MG/0.5ML SOAJ Inject 0.5 mL (6 mg total) under the skin as needed (migraine) May repeat once in 1 hour if needed. Max 12 mg/day, Max 3 days per week 3 mL 1    Syringe/Needle, Disp, (SYRINGE 3CC/25GX5/8\") 25G X 5/8\" 3 ML MISC Use once monthly for B12 injection. 1 each 11     No current facility-administered medications on file prior to visit.     "

## 2025-05-20 NOTE — TELEPHONE ENCOUNTER
----- Message from Juan Carlos Ricketts DO sent at 5/19/2025  9:03 PM EDT -----  Labs reviewed, potassium level is okay, but kidney function slightly reduced as is her bicarbonate.  If she is experiencing loose bowel movements, please ask her to increase fiber intake or what ever   other method she is aware of to help slow down the bowels.  Otherwise labs looked good  ----- Message -----  From: Lab, Background User  Sent: 5/19/2025   7:04 PM EDT  To: Juan Carlos Ricketts DO

## 2025-05-20 NOTE — H&P (VIEW-ONLY)
Name: Brittani Patino      : 1985      MRN: [id number]  Encounter Provider: Ryan Singh DO  Encounter Date: 5/15/2025   Encounter department: [facility]  :  Assessment & Plan  Open wound of left chest wall at port site, initial encounter  Patient with history of daily electrolyte infusions status post gastric bypass, with longstanding history of port placement.  The initial port was in the right for many years and then subsequent breakdown of the port site skin with subsequent removal of port on the right and placement of left-sided port.  Patient has had her left port for approximately 1 year and unfortunately after each access of the port the hole is no longer closing.  Unfortunately with this chronic open hole overlying the port there is risk of infection.  Patient will require removal of left sided port and placement of new port on the right side most likely.  Will discuss with IR if there are any other possibilities, unfortunately though I think any longstanding permacath or PICC line will just open the door to infection.  The procedure itself including all associated risks including bleeding, infection, injury to the lung, hematoma, port dysfunction, breakdown of port site chest wall skin, were all discussed at great length.  The patient verbalized understands risk is willing to proceed, consent was signed.  No other preoperative workup required at this time.         Notes:    Recent PCP note dated 2020 was personally viewed by me.    Blood work:    Recent blood work to include CBC and BMP both dated 2025 was personally reviewed by me.    History of Present Illness   HPI  Brittani Patino is a 40 y.o. female who presents today to discuss her left port.  Patient states that unfortunately each time she is accessing her left port site the hole is not closing.  This was a similar problem she had with her right port.  But fortunately on the right side this lasted for  "approximately 6 years before she had any issues.  Now she has had this left-sided port for approximate 1 year and is already having problems.  I do remember when putting this port into the skin was rather thin.  The port is working appropriately.  She says that she does not feel that she has any type of infectious problem at this time.  No significant pain.  No fevers or chills.    History obtained from: patient    Review of Systems    Review of systems completed, all negative except as noted HPI.    Past Medical History  Past Medical History:   Diagnosis Date    Anemia     Anxiety     C. difficile colitis     COVID-19     2022- pt denies long covid    DUB (dysfunctional uterine bleeding)     H. pylori infection     Head injury July 3, 2016    Hypertension     Hypokalemia     Kidney stone 2023    Left lower quadrant abdominal pain 2020    Migraine     WAGNER (obstructive sleep apnea) 2014    Pancreatitis     Psychiatric disorder     Anxiety    Rectal bleeding     Renal disorder     Rhabdomyolysis     Sleep apnea     resolved with bariatric surgery    Thrombosed external hemorrhoid      Past Surgical History:   Procedure Laterality Date    ABDOMINAL SURGERY      APPENDECTOMY      BARIATRIC SURGERY  Oct 1, 2017     SECTION      CHOLECYSTECTOMY      COLONOSCOPY      COSMETIC SURGERY      \"tummy tuck\"    FL GUIDED CENTRAL VENOUS ACCESS DEVICE INSERTION  2018    FL GUIDED CENTRAL VENOUS ACCESS DEVICE INSERTION  2024    FL RETROGRADE PYELOGRAM  2023    GASTRIC BYPASS      HYSTERECTOMY      LAPAROSCOPY      MI ANRCT XM SURG REQ ANES GENERAL SPI/EDRL DX N/A 2021    Procedure: ANAL EXAM UNDER ANESTHESIA; HEMORRHOIDECTOMY;  Surgeon: Dona Jeffries DO;  Location:  MAIN OR;  Service: General    MI CYSTO/URETERO W/LITHOTRIPSY &INDWELL STENT INSRT Left 2023    Procedure: CYSTOSCOPY URETEROSCOPY WITH RETROGRADE PYELOGRAM, BASKET STONE EXTRACTION AND " INSERTION STENT URETERAL;  Surgeon: Geovanny Rosales MD;  Location: [facility];  Service: Urology    NJ EXC THROMBOSED HEMORRHOID XTRNL N/A 07/08/2022    Procedure: EXCISION OF THROMBOSED EXTERNAL HEMORRHOID, Excision of perianal skin tag, dranage of perianal abscess, anal fistulatomy;  Surgeon: Dona Jeffries DO;  Location: [facility] OR;  Service: General    NJ INSJ TUNNELED CTR VAD W/SUBQ PORT AGE 4 YR/> Left 02/09/2024    Procedure: INSERTION VENOUS PORT (PORT-A-CATH);  Surgeon: Ryan Singh DO;  Location: [facility];  Service: General    NJ RMVL AMIRAH CTR VAD W/SUBQ PORT/ CTR/PRPH INSJ Right 02/09/2024    Procedure: REMOVAL VENOUS PORT (PORT-A-CATH);  Surgeon: Ryan Singh DO;  Location: [facility];  Service: General    TUBAL LIGATION      TUNNELED VENOUS PORT PLACEMENT N/A 12/21/2018    Procedure: INSERTION VENOUS PORT (PORT-A-CATH);  Surgeon: Ryan Singh DO;  Location: [facility];  Service: General     Family History   Problem Relation Age of Onset    No Known Problems Mother     Hypertension Father     Diabetes Father     Urolithiasis Father     Prostate cancer Father     Diabetes Maternal Grandfather       reports that she has never smoked. She has never been exposed to tobacco smoke. She has never used smokeless tobacco. She reports that she does not currently use alcohol. She reports that she does not currently use drugs after having used the following drugs: Marijuana.  Current Outpatient Medications   Medication Instructions    amitriptyline (ELAVIL) 75 mg, Oral, Daily at bedtime    cyanocobalamin 1,000 mcg, Intramuscular, Every 30 days, Next dose due 5/1/2023    EPINEPHrine (EPIPEN) 0.3 mg    ergocalciferol (ERGOCALCIFEROL) 50,000 Units, Oral, 2 times weekly, Mondays and Thursdays    escitalopram (LEXAPRO) 20 mg, Daily at bedtime    ferrous sulfate 324 mg, Oral, Daily before breakfast    gabapentin (NEURONTIN) 100 mg, As needed    Galcanezumab-gnlm 120 mg, Subcutaneous, Every 30 days     "meclizine (ANTIVERT) 12.5 mg, Oral, 3 times daily PRN, Do not take daily    ondansetron (ZOFRAN-ODT) 4 mg, Every 6 hours PRN    potassium chloride 20 MEQ/50ML 80 mEq, Intravenous, Daily, 80 meq in 1 litre bag to be infused daily over 8 hours. Continue as ordered prior to admission    Proferrin ES 12 mg, Oral, Daily    spironolactone (ALDACTONE) 12.5 mg, Oral, Daily    SUMAtriptan Succinate 6 mg, Subcutaneous, As needed, May repeat once in 1 hour if needed. Max 12 mg/day, Max 3 days per week    Syringe/Needle, Disp, (SYRINGE 3CC/25GX5/8\") 25G X 5/8\" 3 ML MISC Use once monthly for B12 injection.   Allergies[1]   Medications Ordered Prior to Encounter[2]   Social History     Tobacco Use    Smoking status: Never     Passive exposure: Never    Smokeless tobacco: Never   Vaping Use    Vaping status: Never Used   Substance and Sexual Activity    Alcohol use: Not Currently    Drug use: Not Currently     Types: Marijuana     Comment: Medical marijuana    Sexual activity: Yes     Partners: Male     Birth control/protection: Surgical        Objective   /70 (BP Location: Left arm, Patient Position: Sitting, Cuff Size: Standard)   Pulse 93   Temp 98.2 °F (36.8 °C) (Temporal)   Ht 5' 2\" (1.575 m)   Wt 59 kg (130 lb)   LMP  (LMP Unknown)   SpO2 97%   BMI 23.78 kg/m²      Physical Exam  Vitals reviewed.   Constitutional:       General: She is not in acute distress.     Appearance: Normal appearance. She is not ill-appearing, toxic-appearing or diaphoretic.   HENT:      Head: Normocephalic and atraumatic.      Right Ear: External ear normal.      Left Ear: External ear normal.     Eyes:      General: No scleral icterus.        Right eye: No discharge.         Left eye: No discharge.       Cardiovascular:      Rate and Rhythm: Normal rate and regular rhythm.      Heart sounds: Normal heart sounds.   Pulmonary:      Effort: Pulmonary effort is normal. No respiratory distress.      Breath sounds: Normal breath sounds. No " stridor.   Chest:      Comments: Right upper chest with evidence of previous port with well-healed scar.    Left chest with palpable port.  The port is currently accessed.  I did review previous pictures which did show evidence of nonhealing port site puncture.  Abdominal:      Palpations: Abdomen is soft.     Musculoskeletal:         General: Normal range of motion.      Cervical back: Normal range of motion.     Skin:     General: Skin is warm.      Coloration: Skin is not jaundiced or pale.      Findings: No bruising or erythema.     Neurological:      General: No focal deficit present.      Mental Status: She is alert and oriented to person, place, and time.      Cranial Nerves: No cranial nerve deficit.     Psychiatric:         Mood and Affect: Mood normal.         Behavior: Behavior normal.         Thought Content: Thought content normal.         Judgment: Judgment normal.                  [1]   Allergies  Allergen Reactions    Other Anaphylaxis     Jelly fish    Nsaids Other (See Comments)     Other reaction(s): Other (Please comment)  Should not take s/p bariatric surgery  Other reaction(s): Other (See Comments)  Should not take as per prior records  Should not take s/p bariatric surgery  Has hx of gastric bypass      Augmentin [Amoxicillin-Pot Clavulanate] Diarrhea    Prednisone      Nausea, vomiting, diarrhea   [2]   Current Outpatient Medications on File Prior to Visit   Medication Sig Dispense Refill    amitriptyline (ELAVIL) 50 mg tablet Take 1.5 tablets (75 mg total) by mouth daily at bedtime 135 tablet 1    cyanocobalamin 1,000 mcg/mL Inject 1 mL (1,000 mcg total) into a muscle every 30 (thirty) days Next dose due 5/1/2023      EPINEPHrine (EPIPEN) 0.3 mg/0.3 mL SOAJ Inject 0.3 mg into a muscle      ergocalciferol (ERGOCALCIFEROL) 1.25 MG (14135 UT) capsule Take 1 capsule (50,000 Units total) by mouth 2 (two) times a week Mondays and Thursdays 24 capsule 1    escitalopram (LEXAPRO) 20 mg tablet 20 mg  "daily at bedtime      ferrous sulfate 324 (65 Fe) mg Take 1 tablet (324 mg total) by mouth daily before breakfast 30 tablet 1    gabapentin (NEURONTIN) 100 mg capsule Take 100 mg by mouth as needed      Galcanezumab-gnlm 120 MG/ML SOAJ Inject 120 mg under the skin every 30 (thirty) days 1 mL 11    Iron Heme Polypeptide (Proferrin ES) 12 MG TABS Take 1 tablet (12 mg total) by mouth in the morning 30 tablet 0    meclizine (ANTIVERT) 12.5 MG tablet Take 1 tablet (12.5 mg total) by mouth 3 (three) times a day as needed for dizziness Do not take daily 30 tablet 1    ondansetron (ZOFRAN-ODT) 4 mg disintegrating tablet Take 4 mg by mouth every 6 (six) hours as needed for nausea or vomiting      potassium chloride 20 MEQ/50ML Inject 200 mL (80 mEq total) into a catheter in a vein daily 80 meq in 1 litre bag to be infused daily over 8 hours. Continue as ordered prior to admission 5600 mL 11    spironolactone (ALDACTONE) 25 mg tablet Take 0.5 tablets (12.5 mg total) by mouth daily      SUMAtriptan Succinate 6 MG/0.5ML SOAJ Inject 0.5 mL (6 mg total) under the skin as needed (migraine) May repeat once in 1 hour if needed. Max 12 mg/day, Max 3 days per week 3 mL 1    Syringe/Needle, Disp, (SYRINGE 3CC/25GX5/8\") 25G X 5/8\" 3 ML MISC Use once monthly for B12 injection. 1 each 11     No current facility-administered medications on file prior to visit.     "

## 2025-05-21 NOTE — RESULT ENCOUNTER NOTE
I called and spoke with Brittani regarding her lab results and provider recommendations. She had no questions or concerns.

## 2025-05-21 NOTE — PRE-PROCEDURE INSTRUCTIONS
Pre-Surgery Instructions:   Medication Instructions    amitriptyline (ELAVIL) 50 mg tablet Take night before surgery    escitalopram (LEXAPRO) 20 mg tablet Take night before surgery    gabapentin (NEURONTIN) 100 mg capsule Uses PRN- OK to take day of surgery    meclizine (ANTIVERT) 12.5 MG tablet Uses PRN- OK to take day of surgery    ondansetron (ZOFRAN-ODT) 4 mg disintegrating tablet Uses PRN- OK to take day of surgery    spironolactone (ALDACTONE) 25 mg tablet Hold day of surgery.      Patient was advised to hold any OTC vitamins, supplements, and NSAIDS until after surgery.    Medication instructions for day of surgery reviewed. Please take all instructed medications with only a sip of water.       You will receive a call one business day prior to surgery with an arrival time and hospital directions. If your surgery is scheduled on a Monday, the hospital will be calling you on the Friday prior to your surgery. If you have not heard from anyone by 8pm, please call the hospital supervisor through the hospital  at [phone]. ([place] [phone] or [place] [phone]).    Do not eat or drink anything after midnight the night before your surgery, including candy, mints, lifesavers, or chewing gum. Do not drink alcohol 24hrs before your surgery. Try not to smoke at least 24hrs before your surgery.       Follow the pre surgery showering instructions as listed in the “My Surgical Experience Booklet” or otherwise provided by your surgeon's office. Do not use a blade to shave the surgical area 1 week before surgery. It is okay to use a clean electric clippers up to 24 hours before surgery. Do not apply any lotions, creams, including makeup, cologne, deodorant, or perfumes after showering on the day of your surgery. Do not use dry shampoo, hair spray, hair gel, or any type of hair products.     No contact lenses, eye make-up, or artificial eyelashes. Remove nail polish, including gel polish, and any  artificial, gel, or acrylic nails if possible. Remove all jewelry including rings and body piercing jewelry.     Wear causal clothing that is easy to take on and off. Consider your type of surgery.    Keep any valuables, jewelry, piercings at home. Please bring any specially ordered equipment (sling, braces) if indicated.    Arrange for a responsible person to drive you to and from the hospital on the day of your surgery. Please confirm the visitor policy for the day of your procedure when you receive your phone call with an arrival time.     Call the surgeon's office with any new illnesses, exposures, or additional questions prior to surgery.    Please reference your “My Surgical Experience Booklet” for additional information to prepare for your upcoming surgery.

## 2025-05-22 ENCOUNTER — ANESTHESIA EVENT (OUTPATIENT)
Dept: PERIOP | Facility: HOSPITAL | Age: 40
End: 2025-05-22
Payer: COMMERCIAL

## 2025-05-22 NOTE — ANESTHESIA PREPROCEDURE EVALUATION
Procedure:  REMOVAL VENOUS PORT (PORT-A-CATH) (Left: Chest)  INSERTION VENOUS PORT (PORT-A-CATH) (Right: Chest)    Relevant Problems   CARDIO   (+) Chest pain   (+) Chronic migraine without aura without status migrainosus, not intractable   (+) Essential hypertension   (+) Migraine   (+) Migraine without aura and without status migrainosus, not intractable   (+) Other hemorrhoids      GI/HEPATIC   (+) Acute pancreatitis   (+) GERD (gastroesophageal reflux disease)   (+) Idiopathic acute pancreatitis without infection or necrosis      /RENAL   (+) Nephrolithiasis   (+) Stage 3 chronic kidney disease (HCC)      HEMATOLOGY   (+) Acute on chronic anemia   (+) Iron deficiency anemia due to dietary causes      NEURO/PSYCH   (+) Chronic migraine without aura without status migrainosus, not intractable   (+) Migraine   (+) Migraine without aura and without status migrainosus, not intractable   (+) Paresthesia of both lower extremities   (+) Paresthesias      Other   (+) Cervical lymphadenopathy        Physical Exam    Airway     Mallampati score: II  TM Distance: >3 FB  Neck ROM: full      Cardiovascular  Rhythm: regular, Rate: normalCardiovascular exam normal    Dental   No notable dental hx     Pulmonary  Pulmonary exam normal Breath sounds clear to auscultation    Neurological      Other Findings  post-pubertal.      Anesthesia Plan  ASA Score- 2     Anesthesia Type- general with ASA Monitors.         Additional Monitors:     Airway Plan: LMA and LMA.    Comment: Risks of general anesthesia discussed including likely possibility of PONV and sore throat, as well as the rare possibilities of aspiration, dental/oropharyngeal/ocular injuries, or grave/life threatening anesthetic and surgical emergencies..       Plan Factors-Exercise tolerance (METS): >4 METS.    Chart reviewed.    Patient summary reviewed.      Patient instructed to abstain from smoking on day of procedure. Patient did not smoke on day of  surgery.            Induction- intravenous.    Postoperative Plan- Plan for postoperative opioid use.   Monitoring Plan -   Post Operative Pain Plan - plan for postoperative opioid use        Informed Consent- Anesthetic plan and risks discussed with patient.  I personally reviewed this patient with the CRNA. Discussed and agreed on the Anesthesia Plan with the CRNA..      NPO Status:  No vitals data found for the desired time range.

## 2025-05-23 ENCOUNTER — APPOINTMENT (OUTPATIENT)
Dept: RADIOLOGY | Facility: HOSPITAL | Age: 40
End: 2025-05-23
Payer: COMMERCIAL

## 2025-05-23 ENCOUNTER — HOSPITAL ENCOUNTER (OUTPATIENT)
Facility: HOSPITAL | Age: 40
Setting detail: OUTPATIENT SURGERY
Discharge: HOME | End: 2025-05-23
Attending: SURGERY | Admitting: SURGERY
Payer: COMMERCIAL

## 2025-05-23 ENCOUNTER — ANESTHESIA (OUTPATIENT)
Dept: PERIOP | Facility: HOSPITAL | Age: 40
End: 2025-05-23
Payer: COMMERCIAL

## 2025-05-23 VITALS
TEMPERATURE: 97.8 F | RESPIRATION RATE: 18 BRPM | WEIGHT: 130 LBS | HEIGHT: 62 IN | SYSTOLIC BLOOD PRESSURE: 120 MMHG | BODY MASS INDEX: 23.92 KG/M2 | OXYGEN SATURATION: 98 % | DIASTOLIC BLOOD PRESSURE: 58 MMHG | HEART RATE: 72 BPM

## 2025-05-23 DIAGNOSIS — S21.102A OPEN WOUND OF LEFT CHEST WALL, INITIAL ENCOUNTER: Primary | ICD-10-CM

## 2025-05-23 DIAGNOSIS — E87.6 HYPOKALEMIA: ICD-10-CM

## 2025-05-23 PROCEDURE — 77002 NEEDLE LOCALIZATION BY XRAY: CPT

## 2025-05-23 PROCEDURE — 36590 REMOVAL TUNNELED CV CATH: CPT | Performed by: SURGERY

## 2025-05-23 PROCEDURE — 77001 FLUOROGUIDE FOR VEIN DEVICE: CPT

## 2025-05-23 PROCEDURE — 36561 INSERT TUNNELED CV CATH: CPT | Performed by: SURGERY

## 2025-05-23 PROCEDURE — 36561 INSERT TUNNELED CV CATH: CPT

## 2025-05-23 PROCEDURE — 77001 FLUOROGUIDE FOR VEIN DEVICE: CPT | Performed by: SURGERY

## 2025-05-23 PROCEDURE — 71045 X-RAY EXAM CHEST 1 VIEW: CPT

## 2025-05-23 PROCEDURE — 36590 REMOVAL TUNNELED CV CATH: CPT

## 2025-05-23 PROCEDURE — C1788 PORT, INDWELLING, IMP: HCPCS | Performed by: SURGERY

## 2025-05-23 DEVICE — 8F PLASTIC DIGNITY® MID-SIZED CT PORT W/SILICONE FILLED SUTURE HOLES W/ATTACHABLE CHRONOFLEX® POLYURETHANE CATHETER
Type: IMPLANTABLE DEVICE | Site: CHEST | Status: FUNCTIONAL
Brand: DIGNITY®

## 2025-05-23 RX ORDER — FENTANYL CITRATE 50 UG/ML
INJECTION, SOLUTION INTRAMUSCULAR; INTRAVENOUS AS NEEDED
Status: DISCONTINUED | OUTPATIENT
Start: 2025-05-23 | End: 2025-05-23

## 2025-05-23 RX ORDER — PROPOFOL 10 MG/ML
INJECTION, EMULSION INTRAVENOUS AS NEEDED
Status: DISCONTINUED | OUTPATIENT
Start: 2025-05-23 | End: 2025-05-23

## 2025-05-23 RX ORDER — PHENYLEPHRINE HCL IN 0.9% NACL 1 MG/10 ML
SYRINGE (ML) INTRAVENOUS AS NEEDED
Status: DISCONTINUED | OUTPATIENT
Start: 2025-05-23 | End: 2025-05-23

## 2025-05-23 RX ORDER — ONDANSETRON 2 MG/ML
4 INJECTION INTRAMUSCULAR; INTRAVENOUS ONCE AS NEEDED
Status: DISCONTINUED | OUTPATIENT
Start: 2025-05-23 | End: 2025-05-23 | Stop reason: HOSPADM

## 2025-05-23 RX ORDER — ALBUTEROL SULFATE 0.83 MG/ML
2.5 SOLUTION RESPIRATORY (INHALATION) ONCE AS NEEDED
Status: DISCONTINUED | OUTPATIENT
Start: 2025-05-23 | End: 2025-05-23 | Stop reason: HOSPADM

## 2025-05-23 RX ORDER — DEXAMETHASONE SODIUM PHOSPHATE 4 MG/ML
INJECTION, SOLUTION INTRA-ARTICULAR; INTRALESIONAL; INTRAMUSCULAR; INTRAVENOUS; SOFT TISSUE AS NEEDED
Status: DISCONTINUED | OUTPATIENT
Start: 2025-05-23 | End: 2025-05-23

## 2025-05-23 RX ORDER — FENTANYL CITRATE/PF 50 MCG/ML
25 SYRINGE (ML) INJECTION
Status: COMPLETED | OUTPATIENT
Start: 2025-05-23 | End: 2025-05-23

## 2025-05-23 RX ORDER — SODIUM CHLORIDE, SODIUM LACTATE, POTASSIUM CHLORIDE, CALCIUM CHLORIDE 600; 310; 30; 20 MG/100ML; MG/100ML; MG/100ML; MG/100ML
INJECTION, SOLUTION INTRAVENOUS CONTINUOUS PRN
Status: DISCONTINUED | OUTPATIENT
Start: 2025-05-23 | End: 2025-05-23

## 2025-05-23 RX ORDER — PROMETHAZINE HYDROCHLORIDE 25 MG/ML
6.25 INJECTION, SOLUTION INTRAMUSCULAR; INTRAVENOUS ONCE AS NEEDED
Status: DISCONTINUED | OUTPATIENT
Start: 2025-05-23 | End: 2025-05-23 | Stop reason: HOSPADM

## 2025-05-23 RX ORDER — OXYCODONE AND ACETAMINOPHEN 5; 325 MG/1; MG/1
1 TABLET ORAL EVERY 4 HOURS PRN
Qty: 12 TABLET | Refills: 0 | Status: SHIPPED | OUTPATIENT
Start: 2025-05-23

## 2025-05-23 RX ORDER — HEPARIN SODIUM 5000 [USP'U]/ML
5000 INJECTION, SOLUTION INTRAVENOUS; SUBCUTANEOUS ONCE
Status: COMPLETED | OUTPATIENT
Start: 2025-05-23 | End: 2025-05-23

## 2025-05-23 RX ORDER — SODIUM CHLORIDE, SODIUM LACTATE, POTASSIUM CHLORIDE, CALCIUM CHLORIDE 600; 310; 30; 20 MG/100ML; MG/100ML; MG/100ML; MG/100ML
125 INJECTION, SOLUTION INTRAVENOUS CONTINUOUS
Status: DISCONTINUED | OUTPATIENT
Start: 2025-05-23 | End: 2025-05-23 | Stop reason: HOSPADM

## 2025-05-23 RX ORDER — ONDANSETRON 2 MG/ML
4 INJECTION INTRAMUSCULAR; INTRAVENOUS EVERY 8 HOURS PRN
Status: DISCONTINUED | OUTPATIENT
Start: 2025-05-23 | End: 2025-05-23 | Stop reason: HOSPADM

## 2025-05-23 RX ORDER — HEPARIN 100 UNIT/ML
SYRINGE INTRAVENOUS AS NEEDED
Status: DISCONTINUED | OUTPATIENT
Start: 2025-05-23 | End: 2025-05-23 | Stop reason: HOSPADM

## 2025-05-23 RX ORDER — BUPIVACAINE HYDROCHLORIDE 5 MG/ML
INJECTION, SOLUTION EPIDURAL; INTRACAUDAL; PERINEURAL AS NEEDED
Status: DISCONTINUED | OUTPATIENT
Start: 2025-05-23 | End: 2025-05-23 | Stop reason: HOSPADM

## 2025-05-23 RX ORDER — OXYCODONE AND ACETAMINOPHEN 5; 325 MG/1; MG/1
2 TABLET ORAL EVERY 4 HOURS PRN
Refills: 0 | Status: DISCONTINUED | OUTPATIENT
Start: 2025-05-23 | End: 2025-05-23 | Stop reason: HOSPADM

## 2025-05-23 RX ORDER — ONDANSETRON 2 MG/ML
INJECTION INTRAMUSCULAR; INTRAVENOUS AS NEEDED
Status: DISCONTINUED | OUTPATIENT
Start: 2025-05-23 | End: 2025-05-23

## 2025-05-23 RX ORDER — MIDAZOLAM HYDROCHLORIDE 2 MG/2ML
INJECTION, SOLUTION INTRAMUSCULAR; INTRAVENOUS AS NEEDED
Status: DISCONTINUED | OUTPATIENT
Start: 2025-05-23 | End: 2025-05-23

## 2025-05-23 RX ORDER — LIDOCAINE HYDROCHLORIDE 10 MG/ML
INJECTION, SOLUTION EPIDURAL; INFILTRATION; INTRACAUDAL; PERINEURAL AS NEEDED
Status: DISCONTINUED | OUTPATIENT
Start: 2025-05-23 | End: 2025-05-23

## 2025-05-23 RX ORDER — CEFAZOLIN SODIUM 2 G/50ML
2000 SOLUTION INTRAVENOUS ONCE
Status: COMPLETED | OUTPATIENT
Start: 2025-05-23 | End: 2025-05-23

## 2025-05-23 RX ADMIN — PROPOFOL 20 MG: 10 INJECTION, EMULSION INTRAVENOUS at 12:09

## 2025-05-23 RX ADMIN — Medication 100 MCG: at 10:39

## 2025-05-23 RX ADMIN — PROPOFOL 150 MG: 10 INJECTION, EMULSION INTRAVENOUS at 10:32

## 2025-05-23 RX ADMIN — FENTANYL CITRATE 25 MCG: 50 INJECTION INTRAMUSCULAR; INTRAVENOUS at 11:34

## 2025-05-23 RX ADMIN — FENTANYL CITRATE 25 MCG: 50 INJECTION INTRAMUSCULAR; INTRAVENOUS at 12:52

## 2025-05-23 RX ADMIN — SODIUM CHLORIDE, SODIUM LACTATE, POTASSIUM CHLORIDE, AND CALCIUM CHLORIDE: .6; .31; .03; .02 INJECTION, SOLUTION INTRAVENOUS at 11:20

## 2025-05-23 RX ADMIN — SODIUM CHLORIDE, SODIUM LACTATE, POTASSIUM CHLORIDE, AND CALCIUM CHLORIDE 125 ML/HR: .6; .31; .03; .02 INJECTION, SOLUTION INTRAVENOUS at 10:25

## 2025-05-23 RX ADMIN — DEXMEDETOMIDINE 8 MCG: 200 INJECTION, SOLUTION INTRAVENOUS at 12:31

## 2025-05-23 RX ADMIN — FENTANYL CITRATE 25 MCG: 50 INJECTION INTRAMUSCULAR; INTRAVENOUS at 13:11

## 2025-05-23 RX ADMIN — FENTANYL CITRATE 25 MCG: 50 INJECTION INTRAMUSCULAR; INTRAVENOUS at 11:20

## 2025-05-23 RX ADMIN — OXYCODONE HYDROCHLORIDE AND ACETAMINOPHEN 2 TABLET: 5; 325 TABLET ORAL at 13:33

## 2025-05-23 RX ADMIN — DEXAMETHASONE SODIUM PHOSPHATE 4 MG: 4 INJECTION, SOLUTION INTRAMUSCULAR; INTRAVENOUS at 10:32

## 2025-05-23 RX ADMIN — HEPARIN SODIUM 5000 UNITS: 5000 INJECTION INTRAVENOUS; SUBCUTANEOUS at 09:49

## 2025-05-23 RX ADMIN — ONDANSETRON 4 MG: 2 INJECTION INTRAMUSCULAR; INTRAVENOUS at 11:17

## 2025-05-23 RX ADMIN — MIDAZOLAM 2 MG: 1 INJECTION INTRAMUSCULAR; INTRAVENOUS at 10:26

## 2025-05-23 RX ADMIN — CEFAZOLIN SODIUM 2000 MG: 2 SOLUTION INTRAVENOUS at 10:39

## 2025-05-23 RX ADMIN — PROPOFOL 10 MG: 10 INJECTION, EMULSION INTRAVENOUS at 12:13

## 2025-05-23 RX ADMIN — PROPOFOL 20 MG: 10 INJECTION, EMULSION INTRAVENOUS at 12:05

## 2025-05-23 RX ADMIN — FENTANYL CITRATE 50 MCG: 50 INJECTION INTRAMUSCULAR; INTRAVENOUS at 10:56

## 2025-05-23 RX ADMIN — LIDOCAINE HYDROCHLORIDE 50 MG: 10 INJECTION, SOLUTION EPIDURAL; INFILTRATION; INTRACAUDAL at 10:32

## 2025-05-23 RX ADMIN — SODIUM CHLORIDE, SODIUM LACTATE, POTASSIUM CHLORIDE, AND CALCIUM CHLORIDE: .6; .31; .03; .02 INJECTION, SOLUTION INTRAVENOUS at 10:22

## 2025-05-23 RX ADMIN — FENTANYL CITRATE 25 MCG: 50 INJECTION INTRAMUSCULAR; INTRAVENOUS at 13:05

## 2025-05-23 RX ADMIN — FENTANYL CITRATE 25 MCG: 50 INJECTION INTRAMUSCULAR; INTRAVENOUS at 13:00

## 2025-05-23 NOTE — ANESTHESIA POSTPROCEDURE EVALUATION
Post-Op Assessment Note    CV Status:  Stable  Pain Score: 0    Pain management: adequate       Mental Status:  Arousable, sleepy and awake   Hydration Status:  Euvolemic   PONV Controlled:  Controlled   Airway Patency:  Patent     Post Op Vitals Reviewed: Yes    No anethesia notable event occurred.    Staff: CRNA           Last Filed PACU Vitals:  Vitals Value Taken Time   Temp 97.6    Pulse 68 05/23/25 12:39   /56 05/23/25 12:38   Resp 3 05/23/25 12:39   SpO2 100 % 05/23/25 12:39   Vitals shown include unfiled device data.

## 2025-05-23 NOTE — ANESTHESIA POSTPROCEDURE EVALUATION
Post-Op Assessment Note    CV Status:  Stable  Pain Score: 0    Pain management: adequate       Mental Status:  Awake and sleepy   Hydration Status:  Stable   PONV Controlled:  None   Airway Patency:  Patent     Post Op Vitals Reviewed: Yes    No anethesia notable event occurred.    Staff: Anesthesiologist           Last Filed PACU Vitals:  Vitals Value Taken Time   Temp 97.9 °F (36.6 °C) 05/23/25 13:15   Pulse 80 05/23/25 13:21   /59 05/23/25 13:18   Resp 29 05/23/25 13:21   SpO2 100 % 05/23/25 13:21   Vitals shown include unfiled device data.    Modified Glynn:     Vitals Value Taken Time   Activity 2 05/23/25 13:15   Respiration 2 05/23/25 13:15   Circulation 2 05/23/25 13:15   Consciousness 2 05/23/25 13:15   Oxygen Saturation 2 05/23/25 13:15     Modified Glynn Score: 10

## 2025-05-23 NOTE — INTERVAL H&P NOTE
H&P reviewed. After examining the patient I find no changes in the patients condition since the H&P had been written.    Vitals:    05/23/25 0933   BP: 136/58   Pulse: 83   Resp: 18   Temp: 98.8 °F (37.1 °C)   SpO2: 100%

## 2025-05-23 NOTE — OP NOTE
OPERATIVE REPORT  PATIENT NAME: Brittani Patino    :  1985  MRN: [id number]  Pt Location: [facility] OR ROOM 01    SURGERY DATE: 2025    Surgeons and Role:     * Ryan Singh DO - Primary     * Steve Camargo PA-C - Assisting    Preop Diagnosis:  Hypokalemia [E87.6]    Post-Op Diagnosis Codes:     * Hypokalemia [E87.6]    Procedure(s):  Left - REMOVAL VENOUS PORT (PORT-A-CATH)  Right - INSERTION VENOUS PORT (PORT-A-CATH)    Specimen(s):  * No specimens in log *    Estimated Blood Loss:   Minimal    Drains:  * No LDAs found *    Anesthesia Type:   Choice    Operative Indications:  Hypokalemia [E87.6]      Operative Findings:  Left port site with evidence of residual nonhealed access sites.  This was removed without complication in its entirety.  Right sided ultrasound-guided right IJ Mediport placed without complication.       Complications:   None    Procedure and Technique:  The patient was brought to the operating arena and placed in supine position operating table. All regular monitoring devices were connected. The patient underwent general anesthesia with LMA intubation without complication. The right arm was then tucked and all pressure points padded. Using an ultrasound machine the right IJ was identified and marked with a surgical marking pen. Patient received perioperative antibiotic's. Bilateral lower extremity sequential compressive devices in place for DVT prophylaxis. The patient was then prepped and draped in usual sterile fashion. A timeout was performed to verify the correct patient, procedure, site, and positioning.     Under ultrasonic guidance the right internal jugular vein was identified and a finder needle was inserted and dark venous blood was then aspirated. A guidewire was then inserted through the needle into the right IJ and advanced without any resistance. The needle was then removed. It was confirmed with anesthesia there is no arrhythmias or PVCs noted. The wire was then secured to  the drapes.  At this time attention was then turned to removal of the left-sided Mediport.  The Mediport site was evaluated.  There were nonhealed access sites.  Incision was made over the Mediport.  This immediately entered into the capsule of the Mediport as the thin was excessively thin and fragile.  The Mediport was then grasped sutures found and subsequently cut out.  Mediport was then removed slowly without any tension.  Pressure was held for approximately 5 minutes on the left neck.  Good hemostasis was noted.  The fragile skin with the port was accessed was trimmed.  The cavity was irrigated normal saline.  The deep dermal layer was then approximated using interrupted 3-0 Vicryl.  Attention was then turned back to the right sided Mediport placement.  Approximately 2 fingerbreadths below the right clavicle a 3cm incision was made with a 15 blade scalpel. The Incision was taken down through the dermal tissue and subcutaneous site using electrocautery until the pre-pectoral fascia was encountered. Using [place] retractors the inferior portion of the incision was elevated and a pocket was created so that the Mediport fit comfortably.  The incision for the port was placed a little higher than normal given the history of previously placed right sided Mediport.  Hemostasis was achieved. The length of the catheter was then measured. Using a C-arm it was approximated where the catheter with end at the caval atrial junction.  A tunneler was then used to create a passage through the subcutaneous tissue leading from the incision and ending at the insertion of the guidewire into the right IJ. The catheter was then brought through the newly created tunnel. The catheter was then clamped at the proximal portion. A dilator/sheath was inserted over the wire and the wire was removed. The catheter was then inserted through the sheath gently using DeBakey forceps as the sheath was then peeled away. Placement of the catheter was  then confirmed using C-arm and was sitting approximately the 2nd vertebral body below the allegra, approximately at the atrial caval junction.  The proximal portion of the catheter was then cut at 23cm and then secured to the Mediport.The Mediport was then flushed with heparinized saline, and flushed easily. Mediport was then placed into the pocket and secured to the prepectoral fascia using 2-0 proline sutures.  At this time was noted to have some bleeding from the tract.  Pressure was held for an additional 5 minutes over the insertion site within the right IJ and there was good hemostasis.  Cavity was irrigated with saline.  The incision was closed with a 4-0 Monocryl and the incision overlying the pocket was closed in layers with 3-0 Vicryl for the dermal layer and a running 4-0 Monocryl for the skin.  The port site was then accessed and covered with a dry sterile dressing.     The patient tolerated procedure well was taken to the postanesthesia care unit in stable condition. All lap, needle, and history counts were correct.     I was present for the entire procedure., A qualified resident physician was not available., and A physician assistant was required during the procedure for retraction, tissue handling, dissection and suturing.    Patient Disposition:  PACU          SIGNATURE: Ryan Singh DO  DATE: May 23, 2025  TIME: 12:09 PM

## 2025-05-23 NOTE — DISCHARGE INSTR - AVS FIRST PAGE
Follow-up with Dr. Singh in 2 weeks as per appointment.    Regular diet as tolerated.    Low intensity activity as tolerate, lifting, nothing greater than 10 pounds for 2 weeks.    Dressing to be removed on the left side on Sunday.  I would continue to keep the right side covered until needing to change access..    If develop fever, nausea vomit, worsening pain, redness or drainage to the incision to call office or go to the ER.

## 2025-06-11 RX ORDER — ESCITALOPRAM OXALATE 20 MG/1
20 TABLET ORAL
Qty: 90 TABLET | Refills: 0 | OUTPATIENT
Start: 2025-06-11

## 2025-06-11 NOTE — TELEPHONE ENCOUNTER
Patient called stating she received  notification from the  water authority that the public water has been contaminated with different bacteria. She is waiting to get a kit to see if her water is specifically contaminated as she has been dealing with a lot of nausea. She is concerned if this can be causing her nausea

## 2025-06-12 ENCOUNTER — OFFICE VISIT (OUTPATIENT)
Dept: SURGERY | Facility: CLINIC | Age: 40
End: 2025-06-12

## 2025-06-12 VITALS
DIASTOLIC BLOOD PRESSURE: 76 MMHG | WEIGHT: 130 LBS | SYSTOLIC BLOOD PRESSURE: 118 MMHG | HEIGHT: 62 IN | OXYGEN SATURATION: 99 % | TEMPERATURE: 98 F | HEART RATE: 94 BPM | BODY MASS INDEX: 23.92 KG/M2

## 2025-06-12 DIAGNOSIS — E87.6 HYPOKALEMIA: Primary | ICD-10-CM

## 2025-06-12 PROCEDURE — 99024 POSTOP FOLLOW-UP VISIT: CPT

## 2025-06-12 NOTE — ASSESSMENT & PLAN NOTE
40-year-old female with history of chronic kidney disease and hypokalemia followed by nephrologist Dr. Juan Carlos Ricketts seen in the office today for postoperative follow-up.  The patient had removal left Mediport and placement of a new right sided Mediport performed in the OR on 2025.  Patient receives IV infusions daily through the port of 80 mEq of potassium daily.  Overall patient denies any wound healing problems.  No shortness of breath or neck swelling.    Right neck Port-A-Cath with Hughes needle in place, Tegaderm dressing covering port.  No erythema or tenderness.  She is due for infusion later today and the Bioclusive dressing was not removed.  The left subclavicular incision from Port-A-Cath removal from that side is clean and dry without tenderness or redness.    Patient is discharged from general surgery care at this point.  Follow-up with nephrology and continue infusions at the direction.    Steve Camargo PA-C     OPERATIVE REPORT  PATIENT NAME: Brittani Patino    :  1985  MRN: [id number]  Pt Location: [facility] OR ROOM 01     SURGERY DATE: 2025     Surgeons and Role:     * Ryan Singh DO - Primary     * Steve Camargo PA-C - Assisting     Preop Diagnosis:  Hypokalemia [E87.6]     Post-Op Diagnosis Codes:     * Hypokalemia [E87.6]     Procedure(s):  Left - REMOVAL VENOUS PORT (PORT-A-CATH)  Right - INSERTION VENOUS PORT (PORT-A-CATH)     Specimen(s):  * No specimens in log *     Estimated Blood Loss:   Minimal     Drains:  * No LDAs found *     Anesthesia Type:   Choice     Operative Indications:  Hypokalemia [E87.6]        Operative Findings:  Left port site with evidence of residual nonhealed access sites.  This was removed without complication in its entirety.  Right sided ultrasound-guided right IJ Mediport placed without complication.

## 2025-06-12 NOTE — TELEPHONE ENCOUNTER
Gave below message  Patient verbalized understanding 
Not likely at this time, her K is not low enough   but we may not be able to get another infusion in her this week, so then it may be possible
Patient called and left voicemail stating that her potassium is dropping even with the infusions  She states that she is not feeling well and that she increase her potassium as well  She is wondering what else she can do 
Patient called in and is asking if there is anyway she can be admitted for one night for a infusion 
Patient states that she has not heard from [facility] at all  She states that it would be fine to increase infusions  She had 2 infusions last week and is scheduled for Thursday this week 
She's right, her K is below 3 again  Last we spoke, the [address] team was supposed to be getting back to her about an appointment to help us figure things out further, have they gotten back to her? Besides that, all I can offer at this time is more frequent infusions of potassium when needed 
no known allergies

## 2025-06-12 NOTE — PROGRESS NOTES
Name: Brittani Patino      : 1985      MRN: [id number]  Encounter Provider: Steve Camargo PA-C  Encounter Date: 2025   Encounter department: [facility]  :  Assessment & Plan  Hypokalemia  40-year-old female with history of chronic kidney disease and hypokalemia followed by nephrologist Dr. Juan Carlos Ricketts seen in the office today for postoperative follow-up.  The patient had removal left Mediport and placement of a new right sided Mediport performed in the OR on 2025.  Patient receives IV infusions daily through the port of 80 mEq of potassium daily.  Overall patient denies any wound healing problems.  No shortness of breath or neck swelling.    Right neck Port-A-Cath with Hughes needle in place, Tegaderm dressing covering port.  No erythema or tenderness.  She is due for infusion later today and the Bioclusive dressing was not removed.  The left subclavicular incision from Port-A-Cath removal from that side is clean and dry without tenderness or redness.    Patient is discharged from general surgery care at this point.  Follow-up with nephrology and continue infusions at the direction.    Steve Camargo PA-C     OPERATIVE REPORT  PATIENT NAME: Brittani Patino    :  1985  MRN: [id number]  Pt Location: [facility] OR ROOM 01     SURGERY DATE: 2025     Surgeons and Role:     * Ryan Singh DO - Primary     * Steve Camargo PA-C - Assisting     Preop Diagnosis:  Hypokalemia [E87.6]     Post-Op Diagnosis Codes:     * Hypokalemia [E87.6]     Procedure(s):  Left - REMOVAL VENOUS PORT (PORT-A-CATH)  Right - INSERTION VENOUS PORT (PORT-A-CATH)     Specimen(s):  * No specimens in log *     Estimated Blood Loss:   Minimal     Drains:  * No LDAs found *     Anesthesia Type:   Choice     Operative Indications:  Hypokalemia [E87.6]        Operative Findings:  Left port site with evidence of residual nonhealed access sites.  This was removed without complication in its  entirety.  Right sided ultrasound-guided right IJ Mediport placed without complication.      History of Present Illness   HPI  Brittani Patino is a 40 y.o. female who presents for postop follow-up status post left Port-A-Cath removal and placement of a new port on the right side done in the OR.  Patient receives IV infusions of potassium daily for hypokalemia.  History of chronic kidney disease and follows by nephrologist Dr. Ricketts.  She offers no complaints at this time.    History obtained from: patient    Review of Systems   Constitutional:  Negative for activity change, appetite change, chills and fever.   HENT:  Negative for trouble swallowing.    Respiratory:  Negative for cough, shortness of breath and wheezing.    Cardiovascular:  Negative for chest pain.   Gastrointestinal:  Negative for constipation, diarrhea and nausea.   Genitourinary:  Negative for difficulty urinating.   Musculoskeletal:  Negative for back pain.   Skin:  Negative for color change, pallor and rash.   Hematological:  Negative for adenopathy. Does not bruise/bleed easily.   All other systems reviewed and are negative.    Pertinent Medical History     Medical History Reviewed by provider this encounter:     .  Past Medical History   Past Medical History[1]  Past Surgical History[2]  Family History[3]   reports that she has never smoked. She has never been exposed to tobacco smoke. She has never used smokeless tobacco. She reports that she does not currently use alcohol. She reports that she does not currently use drugs after having used the following drugs: Marijuana.  Current Outpatient Medications   Medication Instructions    amitriptyline (ELAVIL) 75 mg, Oral, Daily at bedtime    cyanocobalamin 1,000 mcg, Intramuscular, Every 30 days, Next dose due 5/1/2023    EPINEPHrine (EPIPEN) 0.3 mg, Intramuscular    ergocalciferol (ERGOCALCIFEROL) 50,000 Units, Oral, 2 times weekly, Mondays and Thursdays    escitalopram (LEXAPRO) 20 mg, Daily  "at bedtime    ferrous sulfate 324 mg, Oral, Daily before breakfast    gabapentin (NEURONTIN) 100 mg, As needed    Galcanezumab-gnlm 120 mg, Subcutaneous, Every 30 days    meclizine (ANTIVERT) 12.5 mg, Oral, 3 times daily PRN, Do not take daily    ondansetron (ZOFRAN-ODT) 4 mg, Every 6 hours PRN    oxyCODONE-acetaminophen (PERCOCET) 5-325 mg per tablet 1 tablet, Oral, Every 4 hours PRN    potassium chloride 20 MEQ/50ML 80 mEq, Intravenous, Daily, 80 meq in 1 litre bag to be infused daily over 8 hours. Continue as ordered prior to admission    Proferrin ES 12 mg, Oral, Daily    spironolactone (ALDACTONE) 12.5 mg, Oral, Daily    SUMAtriptan Succinate 6 mg, Subcutaneous, As needed, May repeat once in 1 hour if needed. Max 12 mg/day, Max 3 days per week    Syringe/Needle, Disp, (SYRINGE 3CC/25GX5/8\") 25G X 5/8\" 3 ML MISC Use once monthly for B12 injection.   Allergies[4]   Medications Ordered Prior to Encounter[5]   Social History[6]     Objective   /76 (BP Location: Left arm, Patient Position: Sitting, Cuff Size: Standard)   Pulse 94   Temp 98 °F (36.7 °C) (Temporal)   Ht 5' 2\" (1.575 m)   Wt 59 kg (130 lb)   LMP  (LMP Unknown)   SpO2 99%   BMI 23.78 kg/m²      Physical Exam  Vitals reviewed.   Neck:      Comments: Left subclavian incision from port removal appears clean and dry, well-healed.  Right Port-A-Cath noted, Tegaderm dressing over Hughes needle in place.  Nontender, no erythema or fluctuance.  Neck without increased JVP or swelling.  Cardiovascular:      Rate and Rhythm: Regular rhythm.      Heart sounds: Normal heart sounds.   Pulmonary:      Breath sounds: Normal breath sounds.     Musculoskeletal:      Cervical back: Neck supple. No rigidity or tenderness.   Lymphadenopathy:      Cervical: No cervical adenopathy.     Skin:     Coloration: Skin is not pale.      Findings: No erythema or rash.       Administrative Statements   I have spent a total time of 15 minutes in caring for this patient on " "the day of the visit/encounter including Diagnostic results, Prognosis, Risks and benefits of tx options, Instructions for management, Patient and family education, Importance of tx compliance, Risk factor reductions, Impressions, Counseling / Coordination of care, Documenting in the medical record, Reviewing/placing orders in the medical record (including tests, medications, and/or procedures), and Obtaining or reviewing history  .    Steve Camargo PA-C    **Please Note: Portions of the record may have been created using voice recognition software.  Occasional wrong word or \"sound a like\" substitutions may have occurred due to the inherent limitations of voice recognition software.  Read the chart carefully and recognize, using context, where substitutions have occurred.**           [1]   Past Medical History:  Diagnosis Date    Anemia     Anxiety     C. difficile colitis     COVID-19     2022- pt denies long covid    DUB (dysfunctional uterine bleeding)     H. pylori infection     Head injury July 3, 2016    Hypertension     Hypokalemia     Kidney stone 2023    Left lower quadrant abdominal pain 2020    Migraine     WAGNER (obstructive sleep apnea) 2014    Pancreatitis     Psychiatric disorder     Anxiety    Rectal bleeding     Renal disorder     Rhabdomyolysis     Sleep apnea     resolved with bariatric surgery    Thrombosed external hemorrhoid    [2]   Past Surgical History:  Procedure Laterality Date    ABDOMINAL SURGERY      APPENDECTOMY      BARIATRIC SURGERY  Oct 1, 2017     SECTION      CHOLECYSTECTOMY      COLONOSCOPY      COSMETIC SURGERY      \"tummy tuck\"    FL GUIDED CENTRAL VENOUS ACCESS DEVICE INSERTION  2018    FL GUIDED CENTRAL VENOUS ACCESS DEVICE INSERTION  2024    FL RETROGRADE PYELOGRAM  2023    GASTRIC BYPASS      HYSTERECTOMY      LAPAROSCOPY      MT ANRCT XM SURG REQ ANES GENERAL SPI/EDRL DX N/A 2021    Procedure: ANAL EXAM UNDER " ANESTHESIA; HEMORRHOIDECTOMY;  Surgeon: Dona Jeffries DO;  Location: [facility];  Service: General    WV CYSTO/URETERO W/LITHOTRIPSY &INDWELL STENT INSRT Left 01/03/2023    Procedure: CYSTOSCOPY URETEROSCOPY WITH RETROGRADE PYELOGRAM, BASKET STONE EXTRACTION AND INSERTION STENT URETERAL;  Surgeon: Geovanny Rosales MD;  Location: [facility];  Service: Urology    WV EXC THROMBOSED HEMORRHOID XTRNL N/A 07/08/2022    Procedure: EXCISION OF THROMBOSED EXTERNAL HEMORRHOID, Excision of perianal skin tag, dranage of perianal abscess, anal fistulatomy;  Surgeon: Dona Jeffries DO;  Location: [facility];  Service: General    WV INSJ TUNNELED CTR VAD W/SUBQ PORT AGE 5 YR/> Left 02/09/2024    Procedure: INSERTION VENOUS PORT (PORT-A-CATH);  Surgeon: Ryan Singh DO;  [facility]: [facility];  Service: General    WV INSJ TUNNELED CTR VAD W/SUBQ PORT AGE 5 YR/> Right 5/23/2025    Procedure: INSERTION VENOUS PORT (PORT-A-CATH);  Surgeon: Ryan Singh DO;  [facility]: [facility];  Service: General    WV RMVL AMIRAH CTR VAD W/SUBQ PORT/ CTR/PRPH INSJ Right 02/09/2024    Procedure: REMOVAL VENOUS PORT (PORT-A-CATH);  Surgeon: Ryan Singh DO;  [facility];  Service: General    WV RMVL AMIRAH CTR VAD W/SUBQ PORT/ CTR/PRPH INSJ Left 5/23/2025    Procedure: REMOVAL VENOUS PORT (PORT-A-CATH);  Surgeon: Ryan Singh DO;  [facility];  Service: General    TUBAL LIGATION      TUNNELED VENOUS PORT PLACEMENT N/A 12/21/2018    Procedure: INSERTION VENOUS PORT (PORT-A-CATH);  Surgeon: Ryan Singh DO;  [facility];  Service: General   [3]   Family History  Problem Relation Name Age of Onset    No Known Problems Mother      Hypertension Father Jhonathan redline     Diabetes Father Jhonathan redline     Urolithiasis Father Jhonathan redline     Prostate cancer Father Jhonathan redline     Diabetes Maternal Grandfather Javier Ramirez    [4]   Allergies  Allergen Reactions    Other Anaphylaxis     Jelly fish     Nsaids Other (See Comments)     Other reaction(s): Other (Please comment)  Should not take s/p bariatric surgery  Other reaction(s): Other (See Comments)  Should not take as per prior records  Should not take s/p bariatric surgery  Has hx of gastric bypass      Augmentin [Amoxicillin-Pot Clavulanate] Diarrhea    Prednisone      Nausea, vomiting, diarrhea   [5]   Current Outpatient Medications on File Prior to Visit   Medication Sig Dispense Refill    amitriptyline (ELAVIL) 50 mg tablet Take 1.5 tablets (75 mg total) by mouth daily at bedtime 135 tablet 1    cyanocobalamin 1,000 mcg/mL Inject 1 mL (1,000 mcg total) into a muscle every 30 (thirty) days Next dose due 5/1/2023      EPINEPHrine (EPIPEN) 0.3 mg/0.3 mL SOAJ Inject 0.3 mg into a muscle      ergocalciferol (ERGOCALCIFEROL) 1.25 MG (07169 UT) capsule Take 1 capsule (50,000 Units total) by mouth 2 (two) times a week Mondays and Thursdays 24 capsule 1    escitalopram (LEXAPRO) 20 mg tablet 20 mg daily at bedtime      ferrous sulfate 324 (65 Fe) mg Take 1 tablet (324 mg total) by mouth daily before breakfast 30 tablet 1    gabapentin (NEURONTIN) 100 mg capsule Take 100 mg by mouth as needed      Galcanezumab-gnlm 120 MG/ML SOAJ Inject 120 mg under the skin every 30 (thirty) days 1 mL 11    Iron Heme Polypeptide (Proferrin ES) 12 MG TABS Take 1 tablet (12 mg total) by mouth in the morning 30 tablet 0    meclizine (ANTIVERT) 12.5 MG tablet Take 1 tablet (12.5 mg total) by mouth 3 (three) times a day as needed for dizziness Do not take daily 30 tablet 1    ondansetron (ZOFRAN-ODT) 4 mg disintegrating tablet Take 4 mg by mouth every 6 (six) hours as needed for nausea or vomiting      oxyCODONE-acetaminophen (PERCOCET) 5-325 mg per tablet Take 1 tablet by mouth every 4 (four) hours as needed for moderate pain or severe pain Max Daily Amount: 6 tablets 12 tablet 0    potassium chloride 20 MEQ/50ML Inject 200 mL (80 mEq total) into a catheter in a vein daily 80 meq  "in 1 litre bag to be infused daily over 8 hours. Continue as ordered prior to admission 5600 mL 11    spironolactone (ALDACTONE) 25 mg tablet Take 0.5 tablets (12.5 mg total) by mouth daily      SUMAtriptan Succinate 6 MG/0.5ML SOAJ Inject 0.5 mL (6 mg total) under the skin as needed (migraine) May repeat once in 1 hour if needed. Max 12 mg/day, Max 3 days per week 3 mL 1    Syringe/Needle, Disp, (SYRINGE 3CC/25GX5/8\") 25G X 5/8\" 3 ML MISC Use once monthly for B12 injection. 1 each 11     No current facility-administered medications on file prior to visit.   [6]   Social History  Tobacco Use    Smoking status: Never     Passive exposure: Never    Smokeless tobacco: Never   Vaping Use    Vaping status: Never Used   Substance and Sexual Activity    Alcohol use: Not Currently    Drug use: Not Currently     Types: Marijuana     Comment: Medical marijuana    Sexual activity: Yes     Partners: Male     Birth control/protection: Surgical     "

## 2025-06-14 ENCOUNTER — HOSPITAL ENCOUNTER (EMERGENCY)
Facility: HOSPITAL | Age: 40
Discharge: HOME | End: 2025-06-14
Payer: COMMERCIAL

## 2025-06-14 ENCOUNTER — APPOINTMENT (EMERGENCY)
Dept: CT IMAGING | Facility: HOSPITAL | Age: 40
End: 2025-06-14
Payer: COMMERCIAL

## 2025-06-14 VITALS
RESPIRATION RATE: 18 BRPM | HEART RATE: 71 BPM | OXYGEN SATURATION: 92 % | BODY MASS INDEX: 24.72 KG/M2 | SYSTOLIC BLOOD PRESSURE: 121 MMHG | WEIGHT: 135.14 LBS | TEMPERATURE: 98 F | DIASTOLIC BLOOD PRESSURE: 59 MMHG

## 2025-06-14 DIAGNOSIS — R11.0 NAUSEA: ICD-10-CM

## 2025-06-14 DIAGNOSIS — R10.31 RIGHT LOWER QUADRANT ABDOMINAL PAIN: Primary | ICD-10-CM

## 2025-06-14 LAB
ALBUMIN SERPL BCG-MCNC: 4 G/DL (ref 3.5–5)
ALP SERPL-CCNC: 48 U/L (ref 34–104)
ALT SERPL W P-5'-P-CCNC: 12 U/L (ref 7–52)
ANION GAP SERPL CALCULATED.3IONS-SCNC: 8 MMOL/L (ref 4–13)
AST SERPL W P-5'-P-CCNC: 16 U/L (ref 13–39)
B-HCG SERPL-ACNC: <0.6 MIU/ML (ref 0–5)
BACTERIA UR QL AUTO: ABNORMAL /HPF
BASOPHILS # BLD AUTO: 0.06 THOUSANDS/ÂΜL (ref 0–0.1)
BASOPHILS NFR BLD AUTO: 1 % (ref 0–1)
BILIRUB SERPL-MCNC: 0.31 MG/DL (ref 0.2–1)
BILIRUB UR QL STRIP: NEGATIVE
BUN SERPL-MCNC: 15 MG/DL (ref 5–25)
CALCIUM SERPL-MCNC: 8.4 MG/DL (ref 8.4–10.2)
CHLORIDE SERPL-SCNC: 109 MMOL/L (ref 96–108)
CLARITY UR: CLEAR
CO2 SERPL-SCNC: 18 MMOL/L (ref 21–32)
COLOR UR: YELLOW
CREAT SERPL-MCNC: 1.06 MG/DL (ref 0.6–1.3)
EOSINOPHIL # BLD AUTO: 0.13 THOUSAND/ÂΜL (ref 0–0.61)
EOSINOPHIL NFR BLD AUTO: 2 % (ref 0–6)
ERYTHROCYTE [DISTWIDTH] IN BLOOD BY AUTOMATED COUNT: 17.4 % (ref 11.6–15.1)
GFR SERPL CREATININE-BSD FRML MDRD: 65 ML/MIN/1.73SQ M
GLUCOSE SERPL-MCNC: 74 MG/DL (ref 65–140)
GLUCOSE UR STRIP-MCNC: NEGATIVE MG/DL
GRAN CASTS #/AREA URNS LPF: ABNORMAL /[LPF]
HCT VFR BLD AUTO: 32.7 % (ref 34.8–46.1)
HGB BLD-MCNC: 9.7 G/DL (ref 11.5–15.4)
HGB UR QL STRIP.AUTO: NEGATIVE
IMM GRANULOCYTES # BLD AUTO: 0.02 THOUSAND/UL (ref 0–0.2)
IMM GRANULOCYTES NFR BLD AUTO: 0 % (ref 0–2)
KETONES UR STRIP-MCNC: NEGATIVE MG/DL
LEUKOCYTE ESTERASE UR QL STRIP: NEGATIVE
LIPASE SERPL-CCNC: 175 U/L (ref 11–82)
LYMPHOCYTES # BLD AUTO: 2.13 THOUSANDS/ÂΜL (ref 0.6–4.47)
LYMPHOCYTES NFR BLD AUTO: 27 % (ref 14–44)
MCH RBC QN AUTO: 23.9 PG (ref 26.8–34.3)
MCHC RBC AUTO-ENTMCNC: 29.7 G/DL (ref 31.4–37.4)
MCV RBC AUTO: 81 FL (ref 82–98)
MONOCYTES # BLD AUTO: 0.56 THOUSAND/ÂΜL (ref 0.17–1.22)
MONOCYTES NFR BLD AUTO: 7 % (ref 4–12)
NEUTROPHILS # BLD AUTO: 5.01 THOUSANDS/ÂΜL (ref 1.85–7.62)
NEUTS SEG NFR BLD AUTO: 63 % (ref 43–75)
NITRITE UR QL STRIP: NEGATIVE
NON-SQ EPI CELLS URNS QL MICRO: ABNORMAL /HPF
NRBC BLD AUTO-RTO: 0 /100 WBCS
PH UR STRIP.AUTO: 6 [PH]
PLATELET # BLD AUTO: 419 THOUSANDS/UL (ref 149–390)
PMV BLD AUTO: 9.8 FL (ref 8.9–12.7)
POTASSIUM SERPL-SCNC: 3.8 MMOL/L (ref 3.5–5.3)
PROT SERPL-MCNC: 7.2 G/DL (ref 6.4–8.4)
PROT UR STRIP-MCNC: ABNORMAL MG/DL
RBC # BLD AUTO: 4.06 MILLION/UL (ref 3.81–5.12)
RBC #/AREA URNS AUTO: ABNORMAL /HPF
SODIUM SERPL-SCNC: 135 MMOL/L (ref 135–147)
SP GR UR STRIP.AUTO: >=1.03 (ref 1–1.03)
UROBILINOGEN UR STRIP-ACNC: <2 MG/DL
WBC # BLD AUTO: 7.91 THOUSAND/UL (ref 4.31–10.16)
WBC #/AREA URNS AUTO: ABNORMAL /HPF

## 2025-06-14 PROCEDURE — 96368 THER/DIAG CONCURRENT INF: CPT

## 2025-06-14 PROCEDURE — 85025 COMPLETE CBC W/AUTO DIFF WBC: CPT

## 2025-06-14 PROCEDURE — 80053 COMPREHEN METABOLIC PANEL: CPT

## 2025-06-14 PROCEDURE — 84702 CHORIONIC GONADOTROPIN TEST: CPT

## 2025-06-14 PROCEDURE — 74177 CT ABD & PELVIS W/CONTRAST: CPT

## 2025-06-14 PROCEDURE — 99284 EMERGENCY DEPT VISIT MOD MDM: CPT

## 2025-06-14 PROCEDURE — 83690 ASSAY OF LIPASE: CPT

## 2025-06-14 PROCEDURE — 81001 URINALYSIS AUTO W/SCOPE: CPT

## 2025-06-14 PROCEDURE — 96366 THER/PROPH/DIAG IV INF ADDON: CPT

## 2025-06-14 PROCEDURE — 96375 TX/PRO/DX INJ NEW DRUG ADDON: CPT

## 2025-06-14 PROCEDURE — 96365 THER/PROPH/DIAG IV INF INIT: CPT

## 2025-06-14 PROCEDURE — 99285 EMERGENCY DEPT VISIT HI MDM: CPT

## 2025-06-14 PROCEDURE — 36415 COLL VENOUS BLD VENIPUNCTURE: CPT

## 2025-06-14 RX ORDER — KETOROLAC TROMETHAMINE 30 MG/ML
15 INJECTION, SOLUTION INTRAMUSCULAR; INTRAVENOUS ONCE
Status: COMPLETED | OUTPATIENT
Start: 2025-06-14 | End: 2025-06-14

## 2025-06-14 RX ORDER — ONDANSETRON 4 MG/1
4 TABLET, FILM COATED ORAL EVERY 8 HOURS PRN
Qty: 12 TABLET | Refills: 0 | Status: SHIPPED | OUTPATIENT
Start: 2025-06-14 | End: 2025-06-19

## 2025-06-14 RX ORDER — ACETAMINOPHEN 325 MG/1
650 TABLET ORAL ONCE
Status: DISCONTINUED | OUTPATIENT
Start: 2025-06-14 | End: 2025-06-14

## 2025-06-14 RX ORDER — HYDROMORPHONE HCL/PF 1 MG/ML
0.5 SYRINGE (ML) INJECTION ONCE
Status: COMPLETED | OUTPATIENT
Start: 2025-06-14 | End: 2025-06-14

## 2025-06-14 RX ORDER — ACETAMINOPHEN 325 MG/1
650 TABLET ORAL EVERY 6 HOURS PRN
Qty: 50 TABLET | Refills: 0 | Status: SHIPPED | OUTPATIENT
Start: 2025-06-14 | End: 2025-06-24

## 2025-06-14 RX ADMIN — ONDANSETRON 8 MG: 2 INJECTION INTRAMUSCULAR; INTRAVENOUS at 11:35

## 2025-06-14 RX ADMIN — SODIUM CHLORIDE, SODIUM LACTATE, POTASSIUM CHLORIDE, AND CALCIUM CHLORIDE 1000 ML: .6; .31; .03; .02 INJECTION, SOLUTION INTRAVENOUS at 11:20

## 2025-06-14 RX ADMIN — HYDROMORPHONE HYDROCHLORIDE 0.5 MG: 1 INJECTION, SOLUTION INTRAMUSCULAR; INTRAVENOUS; SUBCUTANEOUS at 11:18

## 2025-06-14 RX ADMIN — IOHEXOL 90 ML: 350 INJECTION, SOLUTION INTRAVENOUS at 12:28

## 2025-06-14 RX ADMIN — KETOROLAC TROMETHAMINE 15 MG: 30 INJECTION, SOLUTION INTRAMUSCULAR at 12:51

## 2025-06-14 NOTE — ED ATTENDING ATTESTATION
6/14/2025  I, Chelita Draper DO, saw and evaluated the patient. I have discussed the patient with the resident/non-physician practitioner and agree with the resident's/non-physician practitioner's findings, Plan of Care, and MDM as documented in the resident's/non-physician practitioner's note, except where noted. All available labs and Radiology studies were reviewed.  I was present for key portions of any procedure(s) performed by the resident/non-physician practitioner and I was immediately available to provide assistance.       At this point I agree with the current assessment done in the Emergency Department.  I have conducted an independent evaluation of this patient a history and physical is as follows:    Pt is a 40 yof with complex past medical history including gastric bypass, CKD, nephrolithiasis, pancreatitis, intussusception, and iron deficiency anemia presents with right flank pain. Had onset of nausea, dysuria, and increased urinary frequency 4 days ago, then had onset of severe right flank pain last night which is stabbing, severe, and non-radiating, no relief with tylenol.  Also had several episodes of nonbloody, nonbilious vomiting.  Denies fever, chills, weakness, lightheadedness, chest pain, dyspnea, chest pain, dyspnea, constipation, diarrhea, blood in stool, midline back pain, or rash.  Denies vaginal bleeding, discharge, or concern for STI.  Had prior tubal ligation, cholecystectomy, and appendectomy.    Vital signs within normal limits, physical exam with pale appearing patient who appears to be in significant pain.  Normal cardiac and lung exams.Abd with normoactive bowel sounds, abd is soft and non-distended, mildly tender to palpation of the RUQ region without rebound or guarding, does have right CVA tenderness to percussion. No midline spinal tenderness to percussion.  exam deferred.    Differential includes but is not limited to nephrolithiasis, pyelonephritis, UTI,  pancreatitis, gastritis, PUD.  Doubt colitis, diverticulitis, PUD, ovarian torsion, ectopic pregnancy.    Plan for labs, antiemetics, pain medication, IV fluids, and CT.    See resident documentation for discussion of results and disposition.        ED Course         Critical Care Time  Procedures

## 2025-06-14 NOTE — ED PROVIDER NOTES
Time reflects when diagnosis was documented in both MDM as applicable and the Disposition within this note       Time User Action Codes Description Comment    6/14/2025  1:28 PM Nataly Sussyelva Add [R10.31] Right lower quadrant abdominal pain     6/14/2025  1:28 PM Clarence Ingram Add [R11.0] Nausea           ED Disposition       ED Disposition   Discharge    Condition   Stable    Date/Time   Sat Jun 14, 2025  1:27 PM    Comment   Brittani Patino discharge to home/self care.                   Assessment & Plan     Medical Decision Making  Brittani, 40-year-old female with past medical history of gastric bypass, hypokalemia, stage III CKD presented to the ED for chief complaints of right flank pain since Wednesday.  According to the patient she would rate the pain on a scale of 10 as 8, it is constant and stabbing with radiation to the back.  Ibuprofen and Tylenol did not help relieving the pain.  Pain is associated with intractable nausea vomiting and burning micturition.  Patient states the burning micturition has been present since Wednesday and later she developed increased frequency with some hesitancy.  No recent history of travel or recent sick contact.  Baseline labs including CBC CMP urine RE, lipase along with CT abdomen and pelvis with contrast performed.  Patient given IV Dilaudid, fluids and Zofran for supportive management.  Lipase elevated but less than 3 times of normal with normal wbc count.    CT abdomen/pelvis shows. 1. Fluid distention of the large bowel although it is similar to the previous examination with a cecal diameter of 8 cm, unchanged.  2. Limited assessment of the urinary bladder, almost empty.  3. Question of mild hyperemia of the distal sigmoid and rectal wall. Correlate for clinical concern of colitis/proctitis.  Urinary not significant for any urinary tract infection.  On reevaluation patient reported improvement on pain post Toradol injection.  Patient discharged home on Tylenol and Zofran  with return instructions provided if the nausea vomiting persists and the pain does not get better or get worsen.      Amount and/or Complexity of Data Reviewed  Labs: ordered.  Radiology: ordered.    Risk  OTC drugs.  Prescription drug management.             Medications   HYDROmorphone (DILAUDID) injection 0.5 mg (0.5 mg Intravenous Given 6/14/25 1118)   lactated ringers bolus 1,000 mL (0 mL Intravenous Stopped 6/14/25 1337)   ondansetron (ZOFRAN) 8 mg in sodium chloride 0.9 % 50 mL IVPB (0 mg Intravenous Stopped 6/14/25 1155)   ketorolac (TORADOL) injection 15 mg (15 mg Intravenous Given 6/14/25 1251)   iohexol (OMNIPAQUE) 350 MG/ML injection (MULTI-DOSE) 90 mL (90 mL Intravenous Given 6/14/25 1228)       ED Risk Strat Scores                    No data recorded        SBIRT 22yo+      Flowsheet Row Most Recent Value   Initial Alcohol Screen:  AUDIT-C     1. How often do you have a drink containing alcohol? 0 Filed at: 06/14/2025 1052   2. How many drinks containing alcohol do you have on a typical day you are drinking?  0 Filed at: 06/14/2025 1052   3b. FEMALE Any Age, or MALE 65+: How often do you have 4 or more drinks on one occassion? 0 Filed at: 06/14/2025 1052   Audit-C Score 0 Filed at: 06/14/2025 1052   SLIME: How many times in the past year have you...    Used an illegal drug or used a prescription medication for non-medical reasons? Never Filed at: 06/14/2025 1052                            History of Present Illness       Chief Complaint   Patient presents with    Abdominal Pain     States that she started with burning with urination and nausea on Wednesday. Right sided abdominal pain that radiates to her back started last night. States that she has a history of kidney stones        Past Medical History[1]   Past Surgical History[2]   Family History[3]   Social History[4]   E-Cigarette/Vaping    E-Cigarette Use Never User       E-Cigarette/Vaping Substances    Nicotine No     THC No     CBD No      Flavoring No     Other No     Unknown No       I have reviewed and agree with the history as documented.     Brittani, 40-year-old female with past medical history of gastric bypass, hypokalemia, stage III CKD presented to the ED for chief complaints of right flank pain since Wednesday.  According to the patient she would rate the pain on a scale of 10 as 8, it is constant and stabbing with radiation to the back.  Ibuprofen and Tylenol did not help relieving the pain.  Pain is associated with intractable nausea vomiting and burning micturition.  Patient states the burning micturition has been present since Wednesday and later she developed increased frequency with some hesitancy.  No recent history of travel or recent sick contact.      History provided by:  Patient   used: No        Review of Systems   Constitutional:  Positive for activity change and fatigue. Negative for fever.   Respiratory:  Negative for shortness of breath.    Cardiovascular:  Negative for chest pain, palpitations and leg swelling.   Gastrointestinal:  Positive for abdominal pain, nausea and vomiting. Negative for abdominal distention, constipation and diarrhea.   Genitourinary:  Positive for dysuria, flank pain, frequency and urgency. Negative for hematuria.   Musculoskeletal:  Positive for back pain.   Skin:  Positive for pallor.   Neurological:  Negative for dizziness, tremors and syncope.           Objective       ED Triage Vitals [06/14/25 1050]   Temperature Pulse Blood Pressure Respirations SpO2 Patient Position - Orthostatic VS   97.5 °F (36.4 °C) 85 126/60 18 100 % Sitting      Temp Source Heart Rate Source BP Location FiO2 (%) Pain Score    Temporal Monitor Left arm -- 9      Vitals      Date and Time Temp Pulse SpO2 Resp BP Pain Score FACES Pain Rating User   06/14/25 1330 98 °F (36.7 °C) 71 92 % 18 121/59 -- -- KTR   06/14/25 1300 97.6 °F (36.4 °C) 73 97 % 18 120/62 9 -- KTR   06/14/25 1251 -- -- -- -- -- 9 -- KTR    06/14/25 1200 97.5 °F (36.4 °C) 81 100 % 18 118/62 6 -- KTR   06/14/25 1133 -- -- -- -- -- 9 -- KTR   06/14/25 1118 -- -- -- -- -- 9 -- KTR   06/14/25 1050 97.5 °F (36.4 °C) 85 100 % 18 126/60 9 -- KS            Physical Exam  Constitutional:       General: She is in acute distress.      Appearance: She is ill-appearing.   HENT:      Head: Normocephalic and atraumatic.     Cardiovascular:      Rate and Rhythm: Normal rate and regular rhythm.      Heart sounds: Normal heart sounds.   Pulmonary:      Effort: Pulmonary effort is normal.      Breath sounds: Normal breath sounds.   Abdominal:      General: Abdomen is flat. Bowel sounds are normal.      Tenderness: There is abdominal tenderness. There is right CVA tenderness. There is no guarding or rebound.      Hernia: No hernia is present.     Skin:     General: Skin is warm and dry.      Capillary Refill: Capillary refill takes less than 2 seconds.     Neurological:      General: No focal deficit present.      Mental Status: She is alert.     Psychiatric:         Mood and Affect: Mood is anxious.         Behavior: Behavior normal.         Results Reviewed       Procedure Component Value Units Date/Time    Urine Microscopic [[id number]]  (Abnormal) Collected: 06/14/25 1156    Lab Status: Final result Specimen: Urine, Clean Catch Updated: 06/14/25 1214     RBC, UA None Seen /hpf      WBC, UA 0-1 /hpf      Epithelial Cells Occasional /hpf      Bacteria, UA Occasional /hpf      Granular Casts, UA 0-3    UA w Reflex to Microscopic w Reflex to Culture [[id number]]  (Abnormal) Collected: 06/14/25 1156    Lab Status: Final result Specimen: Urine, Clean Catch Updated: 06/14/25 1208     Color, UA Yellow     Clarity, UA Clear     Specific Gravity, UA >=1.030     pH, UA 6.0     Leukocytes, UA Negative     Nitrite, UA Negative     Protein, UA 30 (1+) mg/dl      Glucose, UA Negative mg/dl      Ketones, UA Negative mg/dl      Urobilinogen, UA <2.0 mg/dl      Bilirubin, UA Negative      Occult Blood, UA Negative    Pregnancy, hCG, quantitative [[id number]]  (Normal) Collected: 06/14/25 1117    Lab Status: Final result Specimen: Blood from Central Venous Line Updated: 06/14/25 1149     HCG, Quant <0.6 mIU/mL     Narrative:       Expected Ranges:    HCG results between 5.0 and 25.0 mIU/mL may be indicative of early pregnancy but should be interpreted in light of the total clinical presentation.    HCG can rise to detectable levels in nina and post menopausal women (0-11.6 mIU/mL).     Approximate               Approximate HCG  Gestation age          Concentration ( mIU/mL)  _____________          ______________________   Weeks                      HCG values  0.2-1                       5-50  1-2                           2-3                         100-5000  3-4                         500-29373  4-5                         1000-90515  5-6                         [id number]  6-8                         [id number]  8-12                        [id number]      CMP [[id number]]  (Abnormal) Collected: 06/14/25 1117    Lab Status: Final result Specimen: Blood from Central Venous Line Updated: 06/14/25 1142     Sodium 135 mmol/L      Potassium 3.8 mmol/L      Chloride 109 mmol/L      CO2 18 mmol/L      ANION GAP 8 mmol/L      BUN 15 mg/dL      Creatinine 1.06 mg/dL      Glucose 74 mg/dL      Calcium 8.4 mg/dL      AST 16 U/L      ALT 12 U/L      Alkaline Phosphatase 48 U/L      Total Protein 7.2 g/dL      Albumin 4.0 g/dL      Total Bilirubin 0.31 mg/dL      eGFR 65 ml/min/1.73sq m     Narrative:      [employer] guidelines for Chronic Kidney Disease (CKD):     Stage 1 with normal or high GFR (GFR > 90 mL/min/1.73 square meters)    Stage 2 Mild CKD (GFR = 60-89 mL/min/1.73 square meters)    Stage 3A Moderate CKD (GFR = 45-59 mL/min/1.73 square meters)    Stage 3B Moderate CKD (GFR = 30-44 mL/min/1.73 square meters)    Stage 4 Severe CKD (GFR = 15-29 mL/min/1.73 square  meters)    Stage 5 End Stage CKD (GFR <15 mL/min/1.73 square meters)  Note: GFR calculation is accurate only with a steady state creatinine    Lipase [[id number]]  (Abnormal) Collected: 06/14/25 1117    Lab Status: Final result Specimen: Blood from Central Venous Line Updated: 06/14/25 1142     Lipase 175 u/L     CBC and differential [[id number]]  (Abnormal) Collected: 06/14/25 1117    Lab Status: Final result Specimen: Blood from Central Venous Line Updated: 06/14/25 1125     WBC 7.91 Thousand/uL      RBC 4.06 Million/uL      Hemoglobin 9.7 g/dL      Hematocrit 32.7 %      MCV 81 fL      MCH 23.9 pg      MCHC 29.7 g/dL      RDW 17.4 %      MPV 9.8 fL      Platelets 419 Thousands/uL      nRBC 0 /100 WBCs      Segmented % 63 %      Immature Grans % 0 %      Lymphocytes % 27 %      Monocytes % 7 %      Eosinophils Relative 2 %      Basophils Relative 1 %      Absolute Neutrophils 5.01 Thousands/µL      Absolute Immature Grans 0.02 Thousand/uL      Absolute Lymphocytes 2.13 Thousands/µL      Absolute Monocytes 0.56 Thousand/µL      Eosinophils Absolute 0.13 Thousand/µL      Basophils Absolute 0.06 Thousands/µL             CT Abdomen pelvis with contrast   Final Interpretation by Shawn Umana MD (06/14 1321)      1. Fluid distention of the large bowel although it is similar to the previous examination with a cecal diameter of 8 cm, unchanged.   2. Limited assessment of the urinary bladder, almost empty.   3. Question of mild hyperemia of the distal sigmoid and rectal wall. Correlate for clinical concern of colitis/proctitis.               Resident: Haylie Chua I, the attending radiologist, have reviewed the images and agree with the final report above.      Workstation performed: [id number]             Procedures    ED Medication and Procedure Management   Prior to Admission Medications   Prescriptions Last Dose Informant Patient Reported? Taking?   EPINEPHrine (EPIPEN) 0.3 mg/0.3 mL SOAJ More than a month Self  "Yes No   Sig: Inject 0.3 mg into a muscle   Galcanezumab-gnlm 120 MG/ML SOAJ Past Month Self No Yes   Sig: Inject 120 mg under the skin every 30 (thirty) days   Iron Heme Polypeptide (Proferrin ES) 12 MG TABS 2025  No Yes   Sig: Take 1 tablet (12 mg total) by mouth in the morning   SUMAtriptan Succinate 6 MG/0.5ML SOAJ More than a month Self No No   Sig: Inject 0.5 mL (6 mg total) under the skin as needed (migraine) May repeat once in 1 hour if needed. Max 12 mg/day, Max 3 days per week   Syringe/Needle, Disp, (SYRINGE 3CC/25GX5/8\") 25G X 5/8\" 3 ML MISC  Self No No   Sig: Use once monthly for B12 injection.   amitriptyline (ELAVIL) 50 mg tablet 2025  No Yes   Sig: Take 1.5 tablets (75 mg total) by mouth daily at bedtime   cyanocobalamin 1,000 mcg/mL Past Month Self No Yes   Sig: Inject 1 mL (1,000 mcg total) into a muscle every 30 (thirty) days Next dose due 2023   ergocalciferol (ERGOCALCIFEROL) 1.25 MG (02993 UT) capsule  Self No No   Sig: Take 1 capsule (50,000 Units total) by mouth 2 (two) times a week  and    escitalopram (LEXAPRO) 20 mg tablet 2025 Self Yes Yes   Si mg daily at bedtime   ferrous sulfate 324 (65 Fe) mg  Self No No   Sig: Take 1 tablet (324 mg total) by mouth daily before breakfast   gabapentin (NEURONTIN) 100 mg capsule Unknown Self Yes No   Sig: Take 100 mg by mouth as needed   meclizine (ANTIVERT) 12.5 MG tablet 2025 Self No Yes   Sig: Take 1 tablet (12.5 mg total) by mouth 3 (three) times a day as needed for dizziness Do not take daily   ondansetron (ZOFRAN-ODT) 4 mg disintegrating tablet 2025 Self Yes Yes   Sig: Take 4 mg by mouth every 6 (six) hours as needed for nausea or vomiting   oxyCODONE-acetaminophen (PERCOCET) 5-325 mg per tablet   No No   Sig: Take 1 tablet by mouth every 4 (four) hours as needed for moderate pain or severe pain Max Daily Amount: 6 tablets   potassium chloride 20 MEQ/50ML 2025 Self No Yes   Sig: Inject 200 mL " (80 mEq total) into a catheter in a vein daily 80 meq in 1 litre bag to be infused daily over 8 hours. Continue as ordered prior to admission   spironolactone (ALDACTONE) 25 mg tablet 6/13/2025 Self No Yes   Sig: Take 0.5 tablets (12.5 mg total) by mouth daily      Facility-Administered Medications: None     Discharge Medication List as of 6/14/2025  1:31 PM        START taking these medications    Details   acetaminophen (TYLENOL) 325 mg tablet Take 2 tablets (650 mg total) by mouth every 6 (six) hours as needed for mild pain for up to 10 days, Starting Sat 6/14/2025, Until Tue 6/24/2025 at 2359, Normal      ondansetron (ZOFRAN) 4 mg tablet Take 1 tablet (4 mg total) by mouth every 8 (eight) hours as needed for nausea or vomiting for up to 5 days, Starting Sat 6/14/2025, Until Thu 6/19/2025 at 2359, Normal           CONTINUE these medications which have NOT CHANGED    Details   amitriptyline (ELAVIL) 50 mg tablet Take 1.5 tablets (75 mg total) by mouth daily at bedtime, Starting Thu 3/13/2025, Normal      cyanocobalamin 1,000 mcg/mL Inject 1 mL (1,000 mcg total) into a muscle every 30 (thirty) days Next dose due 5/1/2023, Starting Tue 4/18/2023, No Print      escitalopram (LEXAPRO) 20 mg tablet 20 mg daily at bedtime, Starting Tue 3/14/2023, Historical Med      Galcanezumab-gnlm 120 MG/ML SOAJ Inject 120 mg under the skin every 30 (thirty) days, Starting Fri 12/13/2024, Normal      Iron Heme Polypeptide (Proferrin ES) 12 MG TABS Take 1 tablet (12 mg total) by mouth in the morning, Starting Thu 4/17/2025, Normal      meclizine (ANTIVERT) 12.5 MG tablet Take 1 tablet (12.5 mg total) by mouth 3 (three) times a day as needed for dizziness Do not take daily, Starting Tue 1/7/2025, Normal      ondansetron (ZOFRAN-ODT) 4 mg disintegrating tablet Take 4 mg by mouth every 6 (six) hours as needed for nausea or vomiting, Starting Mon 6/12/2023, Historical Med      potassium chloride 20 MEQ/50ML Inject 200 mL (80 mEq total)  "into a catheter in a vein daily 80 meq in 1 litre bag to be infused daily over 8 hours. Continue as ordered prior to admission, Starting Mon 8/26/2024, Normal      spironolactone (ALDACTONE) 25 mg tablet Take 0.5 tablets (12.5 mg total) by mouth daily, Starting Sat 1/11/2025, No Print      EPINEPHrine (EPIPEN) 0.3 mg/0.3 mL SOAJ Inject 0.3 mg into a muscle, Starting Fri 7/5/2024, Historical Med      ergocalciferol (ERGOCALCIFEROL) 1.25 MG (08594 UT) capsule Take 1 capsule (50,000 Units total) by mouth 2 (two) times a week Mondays and Thursdays, Starting Mon 2/26/2024, Normal      ferrous sulfate 324 (65 Fe) mg Take 1 tablet (324 mg total) by mouth daily before breakfast, Starting Wed 5/7/2025, Normal      gabapentin (NEURONTIN) 100 mg capsule Take 100 mg by mouth as needed, Starting Mon 11/18/2024, Until Tue 11/18/2025 at 2359, Historical Med      oxyCODONE-acetaminophen (PERCOCET) 5-325 mg per tablet Take 1 tablet by mouth every 4 (four) hours as needed for moderate pain or severe pain Max Daily Amount: 6 tablets, Starting Fri 5/23/2025, Normal      SUMAtriptan Succinate 6 MG/0.5ML SOAJ Inject 0.5 mL (6 mg total) under the skin as needed (migraine) May repeat once in 1 hour if needed. Max 12 mg/day, Max 3 days per week, Starting Tue 1/7/2025, Normal      Syringe/Needle, Disp, (SYRINGE 3CC/25GX5/8\") 25G X 5/8\" 3 ML MISC Use once monthly for B12 injection., Normal           No discharge procedures on file.  ED SEPSIS DOCUMENTATION   Time reflects when diagnosis was documented in both MDM as applicable and the Disposition within this note       Time User Action Codes Description Comment    6/14/2025  1:28 PM Clarence Ingram Add [R10.31] Right lower quadrant abdominal pain     6/14/2025  1:28 PM Clarence Ingram Add [R11.0] Nausea                      [1]   Past Medical History:  Diagnosis Date    Anemia     Anxiety 2020    C. difficile colitis 2016    COVID-19     1/2022- pt denies long covid    DUB (dysfunctional " "uterine bleeding)     H. pylori infection     Head injury July 3, 2016    Hypertension     Hypokalemia     Kidney stone 2023    Left lower quadrant abdominal pain 2020    Migraine     WAGNER (obstructive sleep apnea) 2014    Pancreatitis     Psychiatric disorder     Anxiety    Rectal bleeding     Renal disorder     Rhabdomyolysis     Sleep apnea     resolved with bariatric surgery    Thrombosed external hemorrhoid    [2]   Past Surgical History:  Procedure Laterality Date    ABDOMINAL SURGERY      APPENDECTOMY      BARIATRIC SURGERY  Oct 1, 2017     SECTION      CHOLECYSTECTOMY      COLONOSCOPY      COSMETIC SURGERY      \"tummy tuck\"    FL GUIDED CENTRAL VENOUS ACCESS DEVICE INSERTION  2018    FL GUIDED CENTRAL VENOUS ACCESS DEVICE INSERTION  2024    FL RETROGRADE PYELOGRAM  2023    GASTRIC BYPASS      HYSTERECTOMY      LAPAROSCOPY      OR ANRCT XM SURG REQ ANES GENERAL SPI/EDRL DX N/A 2021    Procedure: ANAL EXAM UNDER ANESTHESIA; HEMORRHOIDECTOMY;  Surgeon: Dona Jeffries DO;  Location: [facility];  Service: General    OR CYSTO/URETERO W/LITHOTRIPSY &INDWELL STENT INSRT Left 2023    Procedure: CYSTOSCOPY URETEROSCOPY WITH RETROGRADE PYELOGRAM, BASKET STONE EXTRACTION AND INSERTION STENT URETERAL;  Surgeon: Geovanny Rosales MD;  Location: [facility];  Service: Urology    OR EXC THROMBOSED HEMORRHOID XTRNL N/A 2022    Procedure: EXCISION OF THROMBOSED EXTERNAL HEMORRHOID, Excision of perianal skin tag, dranage of perianal abscess, anal fistulatomy;  Surgeon: Dona Jeffries DO;  Location: [facility];  Service: General    OR INSJ TUNNELED CTR VAD W/SUBQ PORT AGE 5 YR/> Left 2024    Procedure: INSERTION VENOUS PORT (PORT-A-CATH);  Surgeon: Ryan Singh DO;  Location: [facility];  Service: General    OR INSJ TUNNELED CTR VAD W/SUBQ PORT AGE 5 YR/> Right 2025    Procedure: INSERTION VENOUS PORT (PORT-A-CATH);  Surgeon: Ryan" DO Francisco;  Location: [facility];  Service: General    [place] RMVL AMIRAH CTR VAD W/SUBQ PORT/ CTR/PRPH INSJ Right 02/09/2024    Procedure: REMOVAL VENOUS PORT (PORT-A-CATH);  Surgeon: Ryan Singh DO;  Location: [facility];  Service: General    [place] RMVL AMIRAH CTR VAD W/SUBQ PORT/ CTR/PRPH INSJ Left 5/23/2025    Procedure: REMOVAL VENOUS PORT (PORT-A-CATH);  Surgeon: Ryan Singh DO;  Location: [facility];  Service: General    TUBAL LIGATION      TUNNELED VENOUS PORT PLACEMENT N/A 12/21/2018    Procedure: INSERTION VENOUS PORT (PORT-A-CATH);  Surgeon: Ryan Singh DO;  Location: [facility];  Service: General   [3]   Family History  Problem Relation Name Age of Onset    No Known Problems Mother      Hypertension Father Jhonathan redline     Diabetes Father Jhonathan redline     Urolithiasis Father Jhonathan redline     Prostate cancer Father Jhonathan redline     Diabetes Maternal Grandfather Javier Ramirez    [4]   Social History  Tobacco Use    Smoking status: Never     Passive exposure: Never    Smokeless tobacco: Never   Vaping Use    Vaping status: Never Used   Substance Use Topics    Alcohol use: Not Currently    Drug use: Not Currently     Types: Marijuana     Comment: Medical marijuana        Clarence Ingram MD  06/14/25 6926

## 2025-06-16 DIAGNOSIS — F41.9 ANXIETY: Primary | ICD-10-CM

## 2025-06-16 RX ORDER — ESCITALOPRAM OXALATE 20 MG/1
20 TABLET ORAL EVERY MORNING
Qty: 90 TABLET | Refills: 2 | OUTPATIENT
Start: 2025-06-16

## 2025-06-16 RX ORDER — ESCITALOPRAM OXALATE 20 MG/1
20 TABLET ORAL
Qty: 90 TABLET | Refills: 0 | Status: SHIPPED | OUTPATIENT
Start: 2025-06-16

## 2025-07-03 ENCOUNTER — APPOINTMENT (OUTPATIENT)
Dept: LAB | Facility: CLINIC | Age: 40
End: 2025-07-03
Payer: COMMERCIAL

## 2025-07-03 DIAGNOSIS — E87.6 HYPOKALEMIA: ICD-10-CM

## 2025-07-03 LAB
ANION GAP SERPL CALCULATED.3IONS-SCNC: 15 MMOL/L (ref 4–13)
BUN SERPL-MCNC: 22 MG/DL (ref 5–25)
CALCIUM SERPL-MCNC: 8.8 MG/DL (ref 8.4–10.2)
CHLORIDE SERPL-SCNC: 100 MMOL/L (ref 96–108)
CO2 SERPL-SCNC: 17 MMOL/L (ref 21–32)
CREAT SERPL-MCNC: 1.66 MG/DL (ref 0.6–1.3)
GFR SERPL CREATININE-BSD FRML MDRD: 38 ML/MIN/1.73SQ M
GLUCOSE SERPL-MCNC: 125 MG/DL (ref 65–140)
POTASSIUM SERPL-SCNC: 3.4 MMOL/L (ref 3.5–5.3)
SODIUM SERPL-SCNC: 132 MMOL/L (ref 135–147)

## 2025-07-03 PROCEDURE — 80048 BASIC METABOLIC PNL TOTAL CA: CPT

## 2025-07-03 PROCEDURE — 36415 COLL VENOUS BLD VENIPUNCTURE: CPT

## 2025-07-18 ENCOUNTER — TELEPHONE (OUTPATIENT)
Dept: DENTISTRY | Facility: CLINIC | Age: 40
End: 2025-07-18

## 2025-07-18 NOTE — TELEPHONE ENCOUNTER
PT LVM - said  needs deep cleaning and does not have insurance.  I referred to [facility] and provided her w/phone # and address.    SB

## 2025-07-28 ENCOUNTER — APPOINTMENT (OUTPATIENT)
Dept: LAB | Facility: CLINIC | Age: 40
End: 2025-07-28
Payer: COMMERCIAL

## 2025-07-28 DIAGNOSIS — E87.6 HYPOKALEMIA: ICD-10-CM

## 2025-07-28 DIAGNOSIS — D50.8 IRON DEFICIENCY ANEMIA SECONDARY TO INADEQUATE DIETARY IRON INTAKE: ICD-10-CM

## 2025-07-28 LAB
ANION GAP SERPL CALCULATED.3IONS-SCNC: 10 MMOL/L (ref 4–13)
BASOPHILS # BLD AUTO: 0.05 THOUSANDS/ÂΜL (ref 0–0.1)
BASOPHILS NFR BLD AUTO: 1 % (ref 0–1)
BUN SERPL-MCNC: 21 MG/DL (ref 5–25)
CALCIUM SERPL-MCNC: 8.6 MG/DL (ref 8.4–10.2)
CHLORIDE SERPL-SCNC: 106 MMOL/L (ref 96–108)
CO2 SERPL-SCNC: 21 MMOL/L (ref 21–32)
CREAT SERPL-MCNC: 1.11 MG/DL (ref 0.6–1.3)
EOSINOPHIL # BLD AUTO: 0.05 THOUSAND/ÂΜL (ref 0–0.61)
EOSINOPHIL NFR BLD AUTO: 1 % (ref 0–6)
ERYTHROCYTE [DISTWIDTH] IN BLOOD BY AUTOMATED COUNT: 17.8 % (ref 11.6–15.1)
FERRITIN SERPL-MCNC: 5 NG/ML (ref 30–307)
GFR SERPL CREATININE-BSD FRML MDRD: 61 ML/MIN/1.73SQ M
GLUCOSE P FAST SERPL-MCNC: 110 MG/DL (ref 65–99)
HCT VFR BLD AUTO: 34.3 % (ref 34.8–46.1)
HGB BLD-MCNC: 10.3 G/DL (ref 11.5–15.4)
IMM GRANULOCYTES # BLD AUTO: 0.01 THOUSAND/UL (ref 0–0.2)
IMM GRANULOCYTES NFR BLD AUTO: 0 % (ref 0–2)
IRON SATN MFR SERPL: 4 % (ref 15–50)
IRON SERPL-MCNC: 22 UG/DL (ref 50–212)
LYMPHOCYTES # BLD AUTO: 1.33 THOUSANDS/ÂΜL (ref 0.6–4.47)
LYMPHOCYTES NFR BLD AUTO: 24 % (ref 14–44)
MCH RBC QN AUTO: 23.3 PG (ref 26.8–34.3)
MCHC RBC AUTO-ENTMCNC: 30 G/DL (ref 31.4–37.4)
MCV RBC AUTO: 77 FL (ref 82–98)
MONOCYTES # BLD AUTO: 0.35 THOUSAND/ÂΜL (ref 0.17–1.22)
MONOCYTES NFR BLD AUTO: 6 % (ref 4–12)
NEUTROPHILS # BLD AUTO: 3.79 THOUSANDS/ÂΜL (ref 1.85–7.62)
NEUTS SEG NFR BLD AUTO: 68 % (ref 43–75)
NRBC BLD AUTO-RTO: 0 /100 WBCS
PLATELET # BLD AUTO: 395 THOUSANDS/UL (ref 149–390)
PMV BLD AUTO: 11.2 FL (ref 8.9–12.7)
POTASSIUM SERPL-SCNC: 3.1 MMOL/L (ref 3.5–5.3)
RBC # BLD AUTO: 4.43 MILLION/UL (ref 3.81–5.12)
SODIUM SERPL-SCNC: 137 MMOL/L (ref 135–147)
TIBC SERPL-MCNC: 495.6 UG/DL (ref 250–450)
TRANSFERRIN SERPL-MCNC: 354 MG/DL (ref 203–362)
UIBC SERPL-MCNC: 474 UG/DL (ref 155–355)
WBC # BLD AUTO: 5.58 THOUSAND/UL (ref 4.31–10.16)

## 2025-07-28 PROCEDURE — 83550 IRON BINDING TEST: CPT

## 2025-07-28 PROCEDURE — 82728 ASSAY OF FERRITIN: CPT

## 2025-07-28 PROCEDURE — 85025 COMPLETE CBC W/AUTO DIFF WBC: CPT

## 2025-07-28 PROCEDURE — 80048 BASIC METABOLIC PNL TOTAL CA: CPT

## 2025-07-28 PROCEDURE — 83540 ASSAY OF IRON: CPT

## 2025-07-28 PROCEDURE — 36415 COLL VENOUS BLD VENIPUNCTURE: CPT

## 2025-07-29 ENCOUNTER — RESULTS FOLLOW-UP (OUTPATIENT)
Dept: NEPHROLOGY | Facility: CLINIC | Age: 40
End: 2025-07-29

## 2025-07-29 DIAGNOSIS — E87.6 HYPOKALEMIA: ICD-10-CM

## 2025-07-29 RX ORDER — POTASSIUM CHLORIDE 29.8 MG/ML
INJECTION INTRAVENOUS
Qty: 1000 ML | Refills: 7 | Status: SHIPPED | OUTPATIENT
Start: 2025-07-29 | End: 2025-08-01 | Stop reason: SDUPTHER

## 2025-07-31 ENCOUNTER — APPOINTMENT (OUTPATIENT)
Dept: LAB | Facility: CLINIC | Age: 40
End: 2025-07-31
Payer: COMMERCIAL

## 2025-07-31 DIAGNOSIS — E87.6 HYPOKALEMIA: ICD-10-CM

## 2025-07-31 LAB — POTASSIUM SERPL-SCNC: 3.3 MMOL/L (ref 3.5–5.3)

## 2025-07-31 PROCEDURE — 36415 COLL VENOUS BLD VENIPUNCTURE: CPT

## 2025-07-31 PROCEDURE — 84132 ASSAY OF SERUM POTASSIUM: CPT

## 2025-08-01 ENCOUNTER — RESULTS FOLLOW-UP (OUTPATIENT)
Dept: OTHER | Facility: HOSPITAL | Age: 40
End: 2025-08-01

## 2025-08-01 DIAGNOSIS — E87.6 HYPOKALEMIA: ICD-10-CM

## 2025-08-01 RX ORDER — POTASSIUM CHLORIDE 29.8 MG/ML
INJECTION INTRAVENOUS
Qty: 1000 ML | Refills: 10 | Status: ON HOLD | OUTPATIENT
Start: 2025-08-01

## 2025-08-05 ENCOUNTER — APPOINTMENT (OUTPATIENT)
Dept: LAB | Facility: CLINIC | Age: 40
End: 2025-08-05
Payer: COMMERCIAL

## 2025-08-05 DIAGNOSIS — E87.6 HYPOKALEMIA: ICD-10-CM

## 2025-08-06 ENCOUNTER — TELEPHONE (OUTPATIENT)
Age: 40
End: 2025-08-06

## 2025-08-06 ENCOUNTER — RESULTS FOLLOW-UP (OUTPATIENT)
Age: 40
End: 2025-08-06

## 2025-08-06 ENCOUNTER — APPOINTMENT (OUTPATIENT)
Dept: LAB | Facility: HOSPITAL | Age: 40
End: 2025-08-06
Payer: COMMERCIAL

## 2025-08-06 ENCOUNTER — HOSPITAL ENCOUNTER (INPATIENT)
Facility: HOSPITAL | Age: 40
LOS: 5 days | Discharge: HOME HEALTH | End: 2025-08-12
Attending: EMERGENCY MEDICINE | Admitting: INTERNAL MEDICINE
Payer: COMMERCIAL

## 2025-08-06 ENCOUNTER — APPOINTMENT (EMERGENCY)
Dept: CT IMAGING | Facility: HOSPITAL | Age: 40
End: 2025-08-06
Payer: COMMERCIAL

## 2025-08-06 DIAGNOSIS — E87.6 HYPOKALEMIA: Primary | ICD-10-CM

## 2025-08-14 ENCOUNTER — APPOINTMENT (OUTPATIENT)
Dept: LAB | Facility: CLINIC | Age: 40
End: 2025-08-14
Payer: COMMERCIAL

## 2025-08-14 ENCOUNTER — HOSPITAL ENCOUNTER (INPATIENT)
Facility: HOSPITAL | Age: 40
LOS: 3 days | Discharge: HOME HEALTH | DRG: 683 | End: 2025-08-17
Attending: EMERGENCY MEDICINE | Admitting: FAMILY MEDICINE
Payer: COMMERCIAL

## 2025-08-14 ENCOUNTER — APPOINTMENT (EMERGENCY)
Dept: CT IMAGING | Facility: HOSPITAL | Age: 40
DRG: 683 | End: 2025-08-14
Payer: COMMERCIAL

## 2025-08-14 DIAGNOSIS — E87.20 LACTIC ACIDOSIS: ICD-10-CM

## 2025-08-14 DIAGNOSIS — N17.9 ACUTE KIDNEY INJURY (HCC): ICD-10-CM

## 2025-08-14 DIAGNOSIS — N17.9 AKI (ACUTE KIDNEY INJURY) (HCC): ICD-10-CM

## 2025-08-14 DIAGNOSIS — E86.0 DEHYDRATION: ICD-10-CM

## 2025-08-14 DIAGNOSIS — E87.1 HYPONATREMIA: Primary | ICD-10-CM

## 2025-08-14 PROBLEM — F32.A ANXIETY AND DEPRESSION: Status: ACTIVE | Noted: 2025-08-14

## 2025-08-14 PROBLEM — E87.3 ACUTE RESPIRATORY ALKALOSIS: Status: ACTIVE | Noted: 2025-08-14

## 2025-08-14 PROBLEM — F41.9 ANXIETY AND DEPRESSION: Status: ACTIVE | Noted: 2025-08-14

## 2025-08-14 LAB
ALBUMIN SERPL BCG-MCNC: 5.3 G/DL (ref 3.5–5)
ALP SERPL-CCNC: 58 U/L (ref 34–104)
ALT SERPL W P-5'-P-CCNC: 18 U/L (ref 7–52)
ANION GAP SERPL CALCULATED.3IONS-SCNC: 19 MMOL/L (ref 4–13)
ANION GAP SERPL CALCULATED.3IONS-SCNC: 20 MMOL/L (ref 4–13)
AST SERPL W P-5'-P-CCNC: 32 U/L (ref 13–39)
B-OH-BUTYR SERPL-MCNC: 0.96 MMOL/L (ref 0.2–0.6)
BASE EX.OXY STD BLDV CALC-SCNC: 94.5 % (ref 60–80)
BASE EXCESS BLDV CALC-SCNC: -1.6 MMOL/L
BILIRUB SERPL-MCNC: 0.48 MG/DL (ref 0.2–1)
BUN SERPL-MCNC: 35 MG/DL (ref 5–25)
BUN SERPL-MCNC: 38 MG/DL (ref 5–25)
CALCIUM SERPL-MCNC: 8.9 MG/DL (ref 8.4–10.2)
CALCIUM SERPL-MCNC: 9.9 MG/DL (ref 8.4–10.2)
CHLORIDE SERPL-SCNC: 87 MMOL/L (ref 96–108)
CHLORIDE SERPL-SCNC: 91 MMOL/L (ref 96–108)
CK SERPL-CCNC: 175 U/L (ref 26–192)
CO2 SERPL-SCNC: 16 MMOL/L (ref 21–32)
CO2 SERPL-SCNC: 20 MMOL/L (ref 21–32)
CREAT SERPL-MCNC: 1.8 MG/DL (ref 0.6–1.3)
CREAT SERPL-MCNC: 2.08 MG/DL (ref 0.6–1.3)
GFR SERPL CREATININE-BSD FRML MDRD: 28 ML/MIN/1.73SQ M
GFR SERPL CREATININE-BSD FRML MDRD: 34 ML/MIN/1.73SQ M
GLUCOSE SERPL-MCNC: 111 MG/DL (ref 65–140)
GLUCOSE SERPL-MCNC: 138 MG/DL (ref 65–140)
GLUCOSE SERPL-MCNC: 193 MG/DL (ref 65–140)
GLUCOSE SERPL-MCNC: 96 MG/DL (ref 65–140)
HCO3 BLDV-SCNC: 16.5 MMOL/L (ref 24–30)
LACTATE SERPL-SCNC: 3.7 MMOL/L (ref 0.5–2)
LACTATE SERPL-SCNC: 3.7 MMOL/L (ref 0.5–2)
LIPASE SERPL-CCNC: 593 U/L (ref 11–82)
MAGNESIUM SERPL-MCNC: 2.4 MG/DL (ref 1.9–2.7)
O2 CT BLDV-SCNC: 16 ML/DL
PCO2 BLDV: 15 MM HG (ref 42–50)
PH BLDV: 7.66 [PH] (ref 7.3–7.4)
PO2 BLDV: 80.3 MM HG (ref 35–45)
POTASSIUM SERPL-SCNC: 4.2 MMOL/L (ref 3.5–5.3)
POTASSIUM SERPL-SCNC: 5.2 MMOL/L (ref 3.5–5.3)
PROT SERPL-MCNC: 9.1 G/DL (ref 6.4–8.4)
SODIUM SERPL-SCNC: 126 MMOL/L (ref 135–147)
SODIUM SERPL-SCNC: 127 MMOL/L (ref 135–147)

## 2025-08-14 PROCEDURE — 36415 COLL VENOUS BLD VENIPUNCTURE: CPT

## 2025-08-14 PROCEDURE — 99285 EMERGENCY DEPT VISIT HI MDM: CPT | Performed by: EMERGENCY MEDICINE

## 2025-08-14 PROCEDURE — 99223 1ST HOSP IP/OBS HIGH 75: CPT

## 2025-08-14 PROCEDURE — 83605 ASSAY OF LACTIC ACID: CPT | Performed by: EMERGENCY MEDICINE

## 2025-08-14 PROCEDURE — 83735 ASSAY OF MAGNESIUM: CPT | Performed by: EMERGENCY MEDICINE

## 2025-08-14 PROCEDURE — 74176 CT ABD & PELVIS W/O CONTRAST: CPT

## 2025-08-14 PROCEDURE — 82948 REAGENT STRIP/BLOOD GLUCOSE: CPT

## 2025-08-14 PROCEDURE — 80053 COMPREHEN METABOLIC PANEL: CPT | Performed by: EMERGENCY MEDICINE

## 2025-08-14 PROCEDURE — 96365 THER/PROPH/DIAG IV INF INIT: CPT

## 2025-08-14 PROCEDURE — 83690 ASSAY OF LIPASE: CPT | Performed by: EMERGENCY MEDICINE

## 2025-08-14 PROCEDURE — 93005 ELECTROCARDIOGRAM TRACING: CPT

## 2025-08-14 PROCEDURE — 96375 TX/PRO/DX INJ NEW DRUG ADDON: CPT

## 2025-08-14 PROCEDURE — 82010 KETONE BODYS QUAN: CPT | Performed by: EMERGENCY MEDICINE

## 2025-08-14 PROCEDURE — 99284 EMERGENCY DEPT VISIT MOD MDM: CPT

## 2025-08-14 PROCEDURE — 82550 ASSAY OF CK (CPK): CPT | Performed by: EMERGENCY MEDICINE

## 2025-08-14 PROCEDURE — 82805 BLOOD GASES W/O2 SATURATION: CPT | Performed by: EMERGENCY MEDICINE

## 2025-08-14 RX ORDER — HYDROMORPHONE HCL IN WATER/PF 6 MG/30 ML
0.2 PATIENT CONTROLLED ANALGESIA SYRINGE INTRAVENOUS EVERY 2 HOUR PRN
Refills: 0 | Status: DISCONTINUED | OUTPATIENT
Start: 2025-08-14 | End: 2025-08-17 | Stop reason: HOSPADM

## 2025-08-14 RX ORDER — METHOCARBAMOL 500 MG/1
500 TABLET, FILM COATED ORAL ONCE
Status: COMPLETED | OUTPATIENT
Start: 2025-08-14 | End: 2025-08-14

## 2025-08-14 RX ORDER — DROPERIDOL 2.5 MG/ML
0.62 INJECTION, SOLUTION INTRAMUSCULAR; INTRAVENOUS ONCE
Status: COMPLETED | OUTPATIENT
Start: 2025-08-14 | End: 2025-08-14

## 2025-08-14 RX ORDER — ONDANSETRON 2 MG/ML
4 INJECTION INTRAMUSCULAR; INTRAVENOUS ONCE
Status: COMPLETED | OUTPATIENT
Start: 2025-08-14 | End: 2025-08-14

## 2025-08-14 RX ORDER — ESCITALOPRAM OXALATE 20 MG/1
20 TABLET ORAL
Status: DISCONTINUED | OUTPATIENT
Start: 2025-08-14 | End: 2025-08-17 | Stop reason: HOSPADM

## 2025-08-14 RX ORDER — SODIUM CHLORIDE, SODIUM LACTATE, POTASSIUM CHLORIDE, CALCIUM CHLORIDE 600; 310; 30; 20 MG/100ML; MG/100ML; MG/100ML; MG/100ML
50 INJECTION, SOLUTION INTRAVENOUS CONTINUOUS
Status: DISCONTINUED | OUTPATIENT
Start: 2025-08-14 | End: 2025-08-15

## 2025-08-14 RX ORDER — SODIUM CHLORIDE, SODIUM GLUCONATE, SODIUM ACETATE, POTASSIUM CHLORIDE, MAGNESIUM CHLORIDE, SODIUM PHOSPHATE, DIBASIC, AND POTASSIUM PHOSPHATE .53; .5; .37; .037; .03; .012; .00082 G/100ML; G/100ML; G/100ML; G/100ML; G/100ML; G/100ML; G/100ML
1000 INJECTION, SOLUTION INTRAVENOUS ONCE
Status: DISCONTINUED | OUTPATIENT
Start: 2025-08-14 | End: 2025-08-14

## 2025-08-14 RX ORDER — FENTANYL CITRATE 50 UG/ML
50 INJECTION, SOLUTION INTRAMUSCULAR; INTRAVENOUS ONCE
Refills: 0 | Status: COMPLETED | OUTPATIENT
Start: 2025-08-14 | End: 2025-08-14

## 2025-08-14 RX ORDER — SPIRONOLACTONE 25 MG/1
12.5 TABLET ORAL DAILY
Status: DISCONTINUED | OUTPATIENT
Start: 2025-08-15 | End: 2025-08-17 | Stop reason: HOSPADM

## 2025-08-14 RX ORDER — ACETAMINOPHEN 325 MG/1
650 TABLET ORAL EVERY 4 HOURS PRN
Status: DISCONTINUED | OUTPATIENT
Start: 2025-08-14 | End: 2025-08-14

## 2025-08-14 RX ORDER — ACETAMINOPHEN 325 MG/1
650 TABLET ORAL EVERY 4 HOURS PRN
Status: DISCONTINUED | OUTPATIENT
Start: 2025-08-14 | End: 2025-08-17 | Stop reason: HOSPADM

## 2025-08-14 RX ADMIN — ESCITALOPRAM OXALATE 20 MG: 20 TABLET ORAL at 23:41

## 2025-08-14 RX ADMIN — METHOCARBAMOL 500 MG: 500 TABLET ORAL at 17:16

## 2025-08-14 RX ADMIN — FENTANYL CITRATE 50 MCG: 0.05 INJECTION, SOLUTION INTRAMUSCULAR; INTRAVENOUS at 18:18

## 2025-08-14 RX ADMIN — SODIUM CHLORIDE, SODIUM LACTATE, POTASSIUM CHLORIDE, AND CALCIUM CHLORIDE 150 ML/HR: .6; .31; .03; .02 INJECTION, SOLUTION INTRAVENOUS at 19:34

## 2025-08-14 RX ADMIN — DROPERIDOL 0.62 MG: 2.5 INJECTION, SOLUTION INTRAMUSCULAR; INTRAVENOUS at 19:31

## 2025-08-14 RX ADMIN — SODIUM CHLORIDE, SODIUM LACTATE, POTASSIUM CHLORIDE, AND CALCIUM CHLORIDE 1000 ML: .6; .31; .03; .02 INJECTION, SOLUTION INTRAVENOUS at 16:47

## 2025-08-14 RX ADMIN — ONDANSETRON 4 MG: 2 INJECTION INTRAMUSCULAR; INTRAVENOUS at 17:15

## 2025-08-14 RX ADMIN — AMITRIPTYLINE HYDROCHLORIDE 75 MG: 25 TABLET, FILM COATED ORAL at 23:41

## 2025-08-15 LAB
ANION GAP SERPL CALCULATED.3IONS-SCNC: 11 MMOL/L (ref 4–13)
ANION GAP SERPL CALCULATED.3IONS-SCNC: 13 MMOL/L (ref 4–13)
ATRIAL RATE: 122 BPM
BACTERIA UR QL AUTO: ABNORMAL /HPF
BILIRUB UR QL STRIP: NEGATIVE
BUN SERPL-MCNC: 36 MG/DL (ref 5–25)
BUN SERPL-MCNC: 38 MG/DL (ref 5–25)
CALCIUM SERPL-MCNC: 8.1 MG/DL (ref 8.4–10.2)
CALCIUM SERPL-MCNC: 8.5 MG/DL (ref 8.4–10.2)
CHLORIDE SERPL-SCNC: 94 MMOL/L (ref 96–108)
CHLORIDE SERPL-SCNC: 96 MMOL/L (ref 96–108)
CLARITY UR: CLEAR
CO2 SERPL-SCNC: 26 MMOL/L (ref 21–32)
CO2 SERPL-SCNC: 27 MMOL/L (ref 21–32)
COLOR UR: YELLOW
CREAT SERPL-MCNC: 1.9 MG/DL (ref 0.6–1.3)
CREAT SERPL-MCNC: 2.01 MG/DL (ref 0.6–1.3)
ERYTHROCYTE [DISTWIDTH] IN BLOOD BY AUTOMATED COUNT: 17.7 % (ref 11.6–15.1)
GFR SERPL CREATININE-BSD FRML MDRD: 30 ML/MIN/1.73SQ M
GFR SERPL CREATININE-BSD FRML MDRD: 32 ML/MIN/1.73SQ M
GLUCOSE SERPL-MCNC: 102 MG/DL (ref 65–140)
GLUCOSE SERPL-MCNC: 70 MG/DL (ref 65–140)
GLUCOSE SERPL-MCNC: 81 MG/DL (ref 65–140)
GLUCOSE SERPL-MCNC: 88 MG/DL (ref 65–140)
GLUCOSE SERPL-MCNC: 98 MG/DL (ref 65–140)
GLUCOSE UR STRIP-MCNC: NEGATIVE MG/DL
GRAN CASTS #/AREA URNS LPF: ABNORMAL /[LPF]
HCT VFR BLD AUTO: 30.7 % (ref 34.8–46.1)
HGB BLD-MCNC: 9.7 G/DL (ref 11.5–15.4)
HGB UR QL STRIP.AUTO: ABNORMAL
KETONES UR STRIP-MCNC: ABNORMAL MG/DL
LEUKOCYTE ESTERASE UR QL STRIP: ABNORMAL
MAGNESIUM SERPL-MCNC: 2.2 MG/DL (ref 1.9–2.7)
MCH RBC QN AUTO: 23.3 PG (ref 26.8–34.3)
MCHC RBC AUTO-ENTMCNC: 31.6 G/DL (ref 31.4–37.4)
MCV RBC AUTO: 74 FL (ref 82–98)
NITRITE UR QL STRIP: POSITIVE
NON-SQ EPI CELLS URNS QL MICRO: ABNORMAL /HPF
P AXIS: 73 DEGREES
PH UR STRIP.AUTO: 5.5 [PH]
PHOSPHATE SERPL-MCNC: 6.3 MG/DL (ref 2.7–4.5)
PLATELET # BLD AUTO: 355 THOUSANDS/UL (ref 149–390)
PMV BLD AUTO: 9.6 FL (ref 8.9–12.7)
POTASSIUM SERPL-SCNC: 3.5 MMOL/L (ref 3.5–5.3)
POTASSIUM SERPL-SCNC: 3.8 MMOL/L (ref 3.5–5.3)
PR INTERVAL: 112 MS
PROT UR STRIP-MCNC: ABNORMAL MG/DL
QRS AXIS: 75 DEGREES
QRSD INTERVAL: 84 MS
QT INTERVAL: 298 MS
QTC INTERVAL: 424 MS
RBC # BLD AUTO: 4.16 MILLION/UL (ref 3.81–5.12)
RBC #/AREA URNS AUTO: ABNORMAL /HPF
SODIUM SERPL-SCNC: 133 MMOL/L (ref 135–147)
SODIUM SERPL-SCNC: 134 MMOL/L (ref 135–147)
SP GR UR STRIP.AUTO: >1.03 (ref 1–1.03)
T WAVE AXIS: 77 DEGREES
VENTRICULAR RATE: 122 BPM
WBC # BLD AUTO: 8.41 THOUSAND/UL (ref 4.31–10.16)
WBC #/AREA URNS AUTO: ABNORMAL /HPF

## 2025-08-15 PROCEDURE — 83735 ASSAY OF MAGNESIUM: CPT

## 2025-08-15 PROCEDURE — 82948 REAGENT STRIP/BLOOD GLUCOSE: CPT

## 2025-08-15 PROCEDURE — 99233 SBSQ HOSP IP/OBS HIGH 50: CPT | Performed by: HOSPITALIST

## 2025-08-15 PROCEDURE — 87425 ROTAVIRUS AG IA: CPT | Performed by: HOSPITALIST

## 2025-08-15 PROCEDURE — 99222 1ST HOSP IP/OBS MODERATE 55: CPT | Performed by: STUDENT IN AN ORGANIZED HEALTH CARE EDUCATION/TRAINING PROGRAM

## 2025-08-15 PROCEDURE — 87505 NFCT AGENT DETECTION GI: CPT | Performed by: HOSPITALIST

## 2025-08-15 PROCEDURE — 80048 BASIC METABOLIC PNL TOTAL CA: CPT

## 2025-08-15 PROCEDURE — 84311 SPECTROPHOTOMETRY: CPT | Performed by: HOSPITALIST

## 2025-08-15 PROCEDURE — 84302 ASSAY OF SWEAT SODIUM: CPT | Performed by: HOSPITALIST

## 2025-08-15 PROCEDURE — 85027 COMPLETE CBC AUTOMATED: CPT

## 2025-08-15 PROCEDURE — 87086 URINE CULTURE/COLONY COUNT: CPT | Performed by: HOSPITALIST

## 2025-08-15 PROCEDURE — 84100 ASSAY OF PHOSPHORUS: CPT

## 2025-08-15 PROCEDURE — 89055 LEUKOCYTE ASSESSMENT FECAL: CPT | Performed by: HOSPITALIST

## 2025-08-15 PROCEDURE — 81001 URINALYSIS AUTO W/SCOPE: CPT

## 2025-08-15 PROCEDURE — 93010 ELECTROCARDIOGRAM REPORT: CPT | Performed by: INTERNAL MEDICINE

## 2025-08-15 RX ORDER — POTASSIUM CHLORIDE 14.9 MG/ML
20 INJECTION INTRAVENOUS
Status: DISCONTINUED | OUTPATIENT
Start: 2025-08-15 | End: 2025-08-15

## 2025-08-15 RX ORDER — POTASSIUM CITRATE 1080 MG/1
40 TABLET, EXTENDED RELEASE ORAL ONCE
Status: COMPLETED | OUTPATIENT
Start: 2025-08-15 | End: 2025-08-15

## 2025-08-15 RX ORDER — ONDANSETRON 2 MG/ML
4 INJECTION INTRAMUSCULAR; INTRAVENOUS ONCE
Status: COMPLETED | OUTPATIENT
Start: 2025-08-15 | End: 2025-08-15

## 2025-08-15 RX ORDER — CEFTRIAXONE 1 G/50ML
1000 INJECTION, SOLUTION INTRAVENOUS EVERY 24 HOURS
Status: DISCONTINUED | OUTPATIENT
Start: 2025-08-15 | End: 2025-08-15

## 2025-08-15 RX ORDER — CEFTRIAXONE 1 G/50ML
1000 INJECTION, SOLUTION INTRAVENOUS EVERY 24 HOURS
Status: DISCONTINUED | OUTPATIENT
Start: 2025-08-16 | End: 2025-08-17

## 2025-08-15 RX ORDER — SODIUM CHLORIDE, SODIUM LACTATE, POTASSIUM CHLORIDE, CALCIUM CHLORIDE 600; 310; 30; 20 MG/100ML; MG/100ML; MG/100ML; MG/100ML
125 INJECTION, SOLUTION INTRAVENOUS CONTINUOUS
Status: DISPENSED | OUTPATIENT
Start: 2025-08-15 | End: 2025-08-15

## 2025-08-15 RX ADMIN — HYDROMORPHONE HYDROCHLORIDE 0.2 MG: 0.2 INJECTION, SOLUTION INTRAMUSCULAR; INTRAVENOUS; SUBCUTANEOUS at 10:31

## 2025-08-15 RX ADMIN — ONDANSETRON 4 MG: 2 INJECTION INTRAMUSCULAR; INTRAVENOUS at 23:42

## 2025-08-15 RX ADMIN — POTASSIUM CITRATE 40 MEQ: 10 TABLET, EXTENDED RELEASE ORAL at 18:04

## 2025-08-15 RX ADMIN — HYDROMORPHONE HYDROCHLORIDE 0.2 MG: 0.2 INJECTION, SOLUTION INTRAMUSCULAR; INTRAVENOUS; SUBCUTANEOUS at 22:17

## 2025-08-15 RX ADMIN — SODIUM CHLORIDE, SODIUM LACTATE, POTASSIUM CHLORIDE, AND CALCIUM CHLORIDE 125 ML/HR: .6; .31; .03; .02 INJECTION, SOLUTION INTRAVENOUS at 13:06

## 2025-08-15 RX ADMIN — ESCITALOPRAM OXALATE 20 MG: 20 TABLET ORAL at 22:17

## 2025-08-15 RX ADMIN — POTASSIUM CHLORIDE 20 MEQ: 14.9 INJECTION, SOLUTION INTRAVENOUS at 07:58

## 2025-08-15 RX ADMIN — HYDROMORPHONE HYDROCHLORIDE 0.2 MG: 0.2 INJECTION, SOLUTION INTRAMUSCULAR; INTRAVENOUS; SUBCUTANEOUS at 18:04

## 2025-08-15 RX ADMIN — CEFTRIAXONE 1000 MG: 1 INJECTION, SOLUTION INTRAVENOUS at 07:58

## 2025-08-15 RX ADMIN — HYDROMORPHONE HYDROCHLORIDE 0.2 MG: 0.2 INJECTION, SOLUTION INTRAMUSCULAR; INTRAVENOUS; SUBCUTANEOUS at 01:25

## 2025-08-15 RX ADMIN — SPIRONOLACTONE 12.5 MG: 25 TABLET ORAL at 08:00

## 2025-08-16 PROBLEM — N30.00 ACUTE CYSTITIS: Status: ACTIVE | Noted: 2025-08-16

## 2025-08-16 PROBLEM — R19.7 DIARRHEA: Status: ACTIVE | Noted: 2025-08-16

## 2025-08-16 PROBLEM — R11.2 NAUSEA AND VOMITING: Status: ACTIVE | Noted: 2025-08-16

## 2025-08-16 LAB
ALBUMIN SERPL BCG-MCNC: 3.6 G/DL (ref 3.5–5)
ALP SERPL-CCNC: 39 U/L (ref 34–104)
ALT SERPL W P-5'-P-CCNC: 14 U/L (ref 7–52)
ANION GAP SERPL CALCULATED.3IONS-SCNC: 7 MMOL/L (ref 4–13)
AST SERPL W P-5'-P-CCNC: 23 U/L (ref 13–39)
BACTERIA UR CULT: NORMAL
BILIRUB SERPL-MCNC: 0.28 MG/DL (ref 0.2–1)
BUN SERPL-MCNC: 17 MG/DL (ref 5–25)
CALCIUM SERPL-MCNC: 7.9 MG/DL (ref 8.4–10.2)
CHLORIDE SERPL-SCNC: 98 MMOL/L (ref 96–108)
CO2 SERPL-SCNC: 29 MMOL/L (ref 21–32)
CREAT SERPL-MCNC: 1.22 MG/DL (ref 0.6–1.3)
ERYTHROCYTE [DISTWIDTH] IN BLOOD BY AUTOMATED COUNT: 17.7 % (ref 11.6–15.1)
GFR SERPL CREATININE-BSD FRML MDRD: 55 ML/MIN/1.73SQ M
GLUCOSE SERPL-MCNC: 76 MG/DL (ref 65–140)
GLUCOSE SERPL-MCNC: 86 MG/DL (ref 65–140)
HCT VFR BLD AUTO: 27.8 % (ref 34.8–46.1)
HGB BLD-MCNC: 8.8 G/DL (ref 11.5–15.4)
MAGNESIUM SERPL-MCNC: 2 MG/DL (ref 1.9–2.7)
MCH RBC QN AUTO: 23.9 PG (ref 26.8–34.3)
MCHC RBC AUTO-ENTMCNC: 31.7 G/DL (ref 31.4–37.4)
MCV RBC AUTO: 76 FL (ref 82–98)
PLATELET # BLD AUTO: 317 THOUSANDS/UL (ref 149–390)
PMV BLD AUTO: 9.8 FL (ref 8.9–12.7)
POTASSIUM SERPL-SCNC: 3.5 MMOL/L (ref 3.5–5.3)
PROT SERPL-MCNC: 5.7 G/DL (ref 6.4–8.4)
RBC # BLD AUTO: 3.68 MILLION/UL (ref 3.81–5.12)
RV AG STL QL: NEGATIVE
SODIUM SERPL-SCNC: 134 MMOL/L (ref 135–147)
WBC # BLD AUTO: 5.49 THOUSAND/UL (ref 4.31–10.16)

## 2025-08-16 PROCEDURE — 85027 COMPLETE CBC AUTOMATED: CPT | Performed by: HOSPITALIST

## 2025-08-16 PROCEDURE — 80053 COMPREHEN METABOLIC PANEL: CPT | Performed by: HOSPITALIST

## 2025-08-16 PROCEDURE — 99233 SBSQ HOSP IP/OBS HIGH 50: CPT | Performed by: HOSPITALIST

## 2025-08-16 PROCEDURE — 99232 SBSQ HOSP IP/OBS MODERATE 35: CPT | Performed by: INTERNAL MEDICINE

## 2025-08-16 PROCEDURE — 82948 REAGENT STRIP/BLOOD GLUCOSE: CPT

## 2025-08-16 PROCEDURE — 83735 ASSAY OF MAGNESIUM: CPT | Performed by: HOSPITALIST

## 2025-08-16 RX ORDER — POTASSIUM CITRATE 1080 MG/1
40 TABLET, EXTENDED RELEASE ORAL
Status: DISCONTINUED | OUTPATIENT
Start: 2025-08-16 | End: 2025-08-17 | Stop reason: HOSPADM

## 2025-08-16 RX ADMIN — POTASSIUM CITRATE 40 MEQ: 10 TABLET, EXTENDED RELEASE ORAL at 13:04

## 2025-08-16 RX ADMIN — HYDROMORPHONE HYDROCHLORIDE 0.2 MG: 0.2 INJECTION, SOLUTION INTRAMUSCULAR; INTRAVENOUS; SUBCUTANEOUS at 20:41

## 2025-08-16 RX ADMIN — CEFTRIAXONE 1000 MG: 1 INJECTION, SOLUTION INTRAVENOUS at 10:13

## 2025-08-16 RX ADMIN — HYDROMORPHONE HYDROCHLORIDE 0.2 MG: 0.2 INJECTION, SOLUTION INTRAMUSCULAR; INTRAVENOUS; SUBCUTANEOUS at 15:21

## 2025-08-16 RX ADMIN — SPIRONOLACTONE 12.5 MG: 25 TABLET ORAL at 10:13

## 2025-08-16 RX ADMIN — POTASSIUM CITRATE 40 MEQ: 10 TABLET, EXTENDED RELEASE ORAL at 17:24

## 2025-08-16 RX ADMIN — HYDROMORPHONE HYDROCHLORIDE 0.2 MG: 0.2 INJECTION, SOLUTION INTRAMUSCULAR; INTRAVENOUS; SUBCUTANEOUS at 10:18

## 2025-08-16 RX ADMIN — POTASSIUM CITRATE 40 MEQ: 10 TABLET, EXTENDED RELEASE ORAL at 10:13

## 2025-08-16 RX ADMIN — HYDROMORPHONE HYDROCHLORIDE 0.2 MG: 0.2 INJECTION, SOLUTION INTRAMUSCULAR; INTRAVENOUS; SUBCUTANEOUS at 04:51

## 2025-08-16 RX ADMIN — ESCITALOPRAM OXALATE 20 MG: 20 TABLET ORAL at 22:39

## 2025-08-16 RX ADMIN — HYDROMORPHONE HYDROCHLORIDE 0.2 MG: 0.2 INJECTION, SOLUTION INTRAMUSCULAR; INTRAVENOUS; SUBCUTANEOUS at 23:04

## 2025-08-17 ENCOUNTER — TELEPHONE (OUTPATIENT)
Dept: OTHER | Facility: HOSPITAL | Age: 40
End: 2025-08-17

## 2025-08-17 VITALS
TEMPERATURE: 97.9 F | HEART RATE: 96 BPM | BODY MASS INDEX: 22.86 KG/M2 | HEIGHT: 62 IN | WEIGHT: 124.2 LBS | OXYGEN SATURATION: 100 % | SYSTOLIC BLOOD PRESSURE: 102 MMHG | DIASTOLIC BLOOD PRESSURE: 61 MMHG | RESPIRATION RATE: 18 BRPM

## 2025-08-17 LAB
ANION GAP SERPL CALCULATED.3IONS-SCNC: 9 MMOL/L (ref 4–13)
BUN SERPL-MCNC: 15 MG/DL (ref 5–25)
CALCIUM SERPL-MCNC: 7.6 MG/DL (ref 8.4–10.2)
CHLORIDE SERPL-SCNC: 97 MMOL/L (ref 96–108)
CO2 SERPL-SCNC: 29 MMOL/L (ref 21–32)
CREAT SERPL-MCNC: 1.32 MG/DL (ref 0.6–1.3)
ERYTHROCYTE [DISTWIDTH] IN BLOOD BY AUTOMATED COUNT: 18.3 % (ref 11.6–15.1)
GFR SERPL CREATININE-BSD FRML MDRD: 50 ML/MIN/1.73SQ M
GLUCOSE SERPL-MCNC: 83 MG/DL (ref 65–140)
HCT VFR BLD AUTO: 29.1 % (ref 34.8–46.1)
HGB BLD-MCNC: 8.5 G/DL (ref 11.5–15.4)
MCH RBC QN AUTO: 23.4 PG (ref 26.8–34.3)
MCHC RBC AUTO-ENTMCNC: 29.2 G/DL (ref 31.4–37.4)
MCV RBC AUTO: 80 FL (ref 82–98)
PLATELET # BLD AUTO: 222 THOUSANDS/UL (ref 149–390)
PMV BLD AUTO: 10.3 FL (ref 8.9–12.7)
POTASSIUM SERPL-SCNC: 3.6 MMOL/L (ref 3.5–5.3)
RBC # BLD AUTO: 3.63 MILLION/UL (ref 3.81–5.12)
SODIUM SERPL-SCNC: 135 MMOL/L (ref 135–147)
WBC # BLD AUTO: 6.35 THOUSAND/UL (ref 4.31–10.16)
WBC SPEC QL GRAM STN: ABNORMAL

## 2025-08-17 PROCEDURE — 80048 BASIC METABOLIC PNL TOTAL CA: CPT | Performed by: HOSPITALIST

## 2025-08-17 PROCEDURE — 85027 COMPLETE CBC AUTOMATED: CPT | Performed by: HOSPITALIST

## 2025-08-17 PROCEDURE — 99239 HOSP IP/OBS DSCHRG MGMT >30: CPT | Performed by: HOSPITALIST

## 2025-08-17 PROCEDURE — 99232 SBSQ HOSP IP/OBS MODERATE 35: CPT | Performed by: INTERNAL MEDICINE

## 2025-08-17 RX ORDER — CALCIUM GLUCONATE 20 MG/ML
1 INJECTION, SOLUTION INTRAVENOUS ONCE
Status: COMPLETED | OUTPATIENT
Start: 2025-08-17 | End: 2025-08-17

## 2025-08-17 RX ADMIN — CALCIUM GLUCONATE 1 G: 20 INJECTION, SOLUTION INTRAVENOUS at 13:51

## 2025-08-17 RX ADMIN — HYDROMORPHONE HYDROCHLORIDE 0.2 MG: 0.2 INJECTION, SOLUTION INTRAMUSCULAR; INTRAVENOUS; SUBCUTANEOUS at 12:48

## 2025-08-17 RX ADMIN — POTASSIUM CITRATE 40 MEQ: 10 TABLET, EXTENDED RELEASE ORAL at 12:47

## 2025-08-17 RX ADMIN — POTASSIUM CITRATE 40 MEQ: 10 TABLET, EXTENDED RELEASE ORAL at 08:49

## 2025-08-17 RX ADMIN — HYDROMORPHONE HYDROCHLORIDE 0.2 MG: 0.2 INJECTION, SOLUTION INTRAMUSCULAR; INTRAVENOUS; SUBCUTANEOUS at 03:53

## 2025-08-17 RX ADMIN — HYDROMORPHONE HYDROCHLORIDE 0.2 MG: 0.2 INJECTION, SOLUTION INTRAMUSCULAR; INTRAVENOUS; SUBCUTANEOUS at 08:45

## 2025-08-18 DIAGNOSIS — E61.1 IRON DEFICIENCY: ICD-10-CM

## 2025-08-18 DIAGNOSIS — E53.8 B12 DEFICIENCY: ICD-10-CM

## 2025-08-18 DIAGNOSIS — E87.1 HYPONATREMIA: ICD-10-CM

## 2025-08-18 DIAGNOSIS — E61.2 MAGNESIUM DEFICIENCY: Primary | ICD-10-CM

## 2025-08-18 DIAGNOSIS — E83.51 HYPOCALCEMIA: ICD-10-CM

## 2025-08-18 LAB
C COLI+JEJUNI TUF STL QL NAA+PROBE: NEGATIVE
EC STX1+STX2 GENES STL QL NAA+PROBE: NEGATIVE
SALMONELLA SP SPAO STL QL NAA+PROBE: NEGATIVE
SHIGELLA SP+EIEC IPAH STL QL NAA+PROBE: NEGATIVE

## 2025-08-19 LAB — SODIUM STL-SCNC: <20 MMOL/L

## 2025-08-21 ENCOUNTER — NURSE TRIAGE (OUTPATIENT)
Age: 40
End: 2025-08-21

## 2025-08-21 LAB — MISCELLANEOUS LAB TEST RESULT: NORMAL

## 2025-08-22 DIAGNOSIS — E87.6 HYPOKALEMIA: Primary | ICD-10-CM

## 2025-08-22 LAB — OSMOLALITY STL: NORMAL MOSMOL/KG

## (undated) DEVICE — SPECIMEN CONTAINER STERILE PEEL PACK

## (undated) DEVICE — PROVE COVER: Brand: UNBRANDED

## (undated) DEVICE — GLOVE SRG BIOGEL ECLIPSE 7.5

## (undated) DEVICE — BASKET SPECIMEN RETRIVAL 1.9FR 120CM

## (undated) DEVICE — SYRINGE 10ML LL

## (undated) DEVICE — NEEDLE 25G X 1 1/2

## (undated) DEVICE — 3M™ TEGADERM™ CHG DRESSING 25/CARTON 4 CARTONS/CASE 1659: Brand: TEGADERM™

## (undated) DEVICE — 2000CC GUARDIAN II: Brand: GUARDIAN

## (undated) DEVICE — SUT VICRYL 3-0 SH 27 IN J416H

## (undated) DEVICE — WET SKIN PREP TRAY: Brand: MEDLINE INDUSTRIES, INC.

## (undated) DEVICE — SURGIFOAM 8.5 X 12.5

## (undated) DEVICE — TUBING SUCTION 5MM X 12 FT

## (undated) DEVICE — PLUMEPEN PRO 10FT

## (undated) DEVICE — GLOVE INDICATOR PI UNDERGLOVE SZ 7 BLUE

## (undated) DEVICE — REM POLYHESIVE ADULT PATIENT RETURN ELECTRODE: Brand: VALLEYLAB

## (undated) DEVICE — SUT CHROMIC 3-0 SH 27 IN G122H

## (undated) DEVICE — ELECTRODE NEEDLE MEGAFINE 2IN E-Z CLEAN MEGADYNE -0118

## (undated) DEVICE — 3M™ STERI-STRIP™ REINFORCED ADHESIVE SKIN CLOSURES, R1546, 1/4 IN X 4 IN (6 MM X 100 MM), 10 STRIPS/ENVELOPE: Brand: 3M™ STERI-STRIP™

## (undated) DEVICE — SUT PROLENE 2-0 CT-2 30 IN 8411H

## (undated) DEVICE — MEDI-VAC YANKAUER SUCTION HANDLE W/BULBOUS AND CONTROL VENT: Brand: CARDINAL HEALTH

## (undated) DEVICE — GAUZE SPONGES,16 PLY: Brand: CURITY

## (undated) DEVICE — ABDOMINAL PAD: Brand: DERMACEA

## (undated) DEVICE — CHLORAPREP HI-LITE 26ML ORANGE

## (undated) DEVICE — SUT ETHILON 3-0 PS-1 18 IN 1663G

## (undated) DEVICE — MEDI-VAC YANK SUCT HNDL W/TPRD BULBOUS TIP: Brand: CARDINAL HEALTH

## (undated) DEVICE — BAG DECANTER

## (undated) DEVICE — CATH URETERAL 5FR X 70 CM FLEX TIP POLYUR BARD

## (undated) DEVICE — 3M™ TEGADERM™ TRANSPARENT FILM DRESSING FRAME STYLE, 1624W, 2-3/8 IN X 2-3/4 IN (6 CM X 7 CM), 100/CT 4CT/CASE: Brand: 3M™ TEGADERM™

## (undated) DEVICE — PREMIUM DRY TRAY LF: Brand: MEDLINE INDUSTRIES, INC.

## (undated) DEVICE — SCD SEQUENTIAL COMPRESSION COMFORT SLEEVE MEDIUM KNEE LENGTH: Brand: KENDALL SCD

## (undated) DEVICE — GLOVE SRG BIOGEL 6

## (undated) DEVICE — Device

## (undated) DEVICE — CHLORHEXIDINE 4PCT 4 OZ

## (undated) DEVICE — INTENDED FOR TISSUE SEPARATION, AND OTHER PROCEDURES THAT REQUIRE A SHARP SURGICAL BLADE TO PUNCTURE OR CUT.: Brand: BARD-PARKER SAFETY BLADES SIZE 15, STERILE

## (undated) DEVICE — TRANSPOSAL ULTRAFLEX DUO/QUAD ULTRA CART MANIFOLD

## (undated) DEVICE — ADHESIVE SKIN HIGH VISCOSITY EXOFIN 1ML

## (undated) DEVICE — GLOVE INDICATOR PI UNDERGLOVE SZ 6.5 BLUE

## (undated) DEVICE — HARMONIC FOCUS SHEARS 9CM LENGTH + ADAPTIVE TISSUE TECHNOLOGY FOR USE WITH BLUE HAND PIECE ONLY: Brand: HARMONIC FOCUS

## (undated) DEVICE — ANTIBACTERIAL UNDYED BRAIDED (POLYGLACTIN 910), SYNTHETIC ABSORBABLE SUTURE: Brand: COATED VICRYL

## (undated) DEVICE — BETHLEHEM UNIVERSAL MINOR GEN: Brand: CARDINAL HEALTH

## (undated) DEVICE — GLOVE SRG BIOGEL ECLIPSE 7

## (undated) DEVICE — SYRINGE 10ML SLIP TIP LF

## (undated) DEVICE — SUT MONOCRYL 4-0 PS-2 27 IN Y426H

## (undated) DEVICE — NEPTUNE E-SEP SMOKE EVACUATION PENCIL, COATED, 70MM BLADE, PUSH BUTTON SWITCH: Brand: NEPTUNE E-SEP

## (undated) DEVICE — SUT SILK 2-0 TIES 144 IN LA55G

## (undated) DEVICE — PORT CHG DRESSING KIT: Brand: MEDLINE INDUSTRIES, INC.

## (undated) DEVICE — PAD GROUNDING DUAL ADULT

## (undated) DEVICE — PAD GROUNDING ADULT

## (undated) DEVICE — 3M™ STERI-STRIP™ REINFORCED ADHESIVE SKIN CLOSURES, R1547, 1/2 IN X 4 IN (12 MM X 100 MM), 6 STRIPS/ENVELOPE: Brand: 3M™ STERI-STRIP™

## (undated) DEVICE — SAFESTEP 20G X 0.75 INCH: Brand: PORT ACCESS NEEDLE

## (undated) DEVICE — VIAL DECANTER

## (undated) DEVICE — LUBRICANT SURGILUBE TUBE 4 OZ  FLIP TOP

## (undated) DEVICE — C-ARM: Brand: UNBRANDED

## (undated) DEVICE — DRAPE C-ARM X-RAY

## (undated) DEVICE — ALL PURPOSE SPONGES,NON-WOVEN, 4 PLY: Brand: CURITY

## (undated) DEVICE — 3M™ STERI-STRIP™ REINFORCED ADHESIVE SKIN CLOSURES, R1541, 1/4 IN X 3 IN (6 MM X 75 MM), 3 STRIPS/ENVELOPE: Brand: 3M™ STERI-STRIP™

## (undated) DEVICE — GLOVE INDICATOR PI UNDERGLOVE SZ 8 BLUE

## (undated) DEVICE — SYRINGE 50ML LL

## (undated) DEVICE — PACK TUR

## (undated) DEVICE — GLOVE PI ULTRA TOUCH SZ.7.0

## (undated) DEVICE — GUIDEWIRE STRGHT TIP 0.035 IN  SOLO PLUS

## (undated) DEVICE — 3M™ IOBAN™ 2 ANTIMICROBIAL INCISE DRAPE 6648EZ: Brand: IOBAN™ 2

## (undated) DEVICE — DECANTER: Brand: UNBRANDED